# Patient Record
Sex: MALE | Race: WHITE | NOT HISPANIC OR LATINO | Employment: OTHER | URBAN - METROPOLITAN AREA
[De-identification: names, ages, dates, MRNs, and addresses within clinical notes are randomized per-mention and may not be internally consistent; named-entity substitution may affect disease eponyms.]

---

## 2017-01-05 ENCOUNTER — GENERIC CONVERSION - ENCOUNTER (OUTPATIENT)
Dept: OTHER | Facility: OTHER | Age: 77
End: 2017-01-05

## 2017-03-28 ENCOUNTER — ALLSCRIPTS OFFICE VISIT (OUTPATIENT)
Dept: OTHER | Facility: OTHER | Age: 77
End: 2017-03-28

## 2017-03-28 DIAGNOSIS — E11.9 TYPE 2 DIABETES MELLITUS WITHOUT COMPLICATIONS (HCC): ICD-10-CM

## 2017-05-30 ENCOUNTER — ALLSCRIPTS OFFICE VISIT (OUTPATIENT)
Dept: OTHER | Facility: OTHER | Age: 77
End: 2017-05-30

## 2017-05-30 DIAGNOSIS — M25.552 PAIN IN LEFT HIP: ICD-10-CM

## 2017-05-31 ENCOUNTER — HOSPITAL ENCOUNTER (OUTPATIENT)
Dept: RADIOLOGY | Facility: HOSPITAL | Age: 77
Discharge: HOME | End: 2017-05-31
Payer: MEDICARE

## 2017-05-31 ENCOUNTER — TRANSCRIBE ORDERS (OUTPATIENT)
Dept: RADIOLOGY | Facility: HOSPITAL | Age: 77
End: 2017-05-31

## 2017-05-31 DIAGNOSIS — M25.552 PAIN IN LEFT HIP: ICD-10-CM

## 2017-05-31 PROCEDURE — 73502 X-RAY EXAM HIP UNI 2-3 VIEWS: CPT

## 2017-06-08 ENCOUNTER — ALLSCRIPTS OFFICE VISIT (OUTPATIENT)
Dept: OTHER | Facility: OTHER | Age: 77
End: 2017-06-08

## 2017-07-21 ENCOUNTER — APPOINTMENT (OUTPATIENT)
Dept: LAB | Facility: CLINIC | Age: 77
End: 2017-07-21
Payer: MEDICARE

## 2017-07-21 DIAGNOSIS — E11.9 TYPE 2 DIABETES MELLITUS WITHOUT COMPLICATIONS (HCC): ICD-10-CM

## 2017-07-21 LAB
EST. AVERAGE GLUCOSE BLD GHB EST-MCNC: 131 MG/DL
GLUCOSE P FAST SERPL-MCNC: 138 MG/DL (ref 65–99)
HBA1C MFR BLD: 6.2 % (ref 4.2–6.3)

## 2017-07-21 PROCEDURE — 36415 COLL VENOUS BLD VENIPUNCTURE: CPT

## 2017-07-21 PROCEDURE — 83036 HEMOGLOBIN GLYCOSYLATED A1C: CPT

## 2017-07-21 PROCEDURE — 82947 ASSAY GLUCOSE BLOOD QUANT: CPT

## 2017-08-01 ENCOUNTER — ALLSCRIPTS OFFICE VISIT (OUTPATIENT)
Dept: OTHER | Facility: OTHER | Age: 77
End: 2017-08-01

## 2017-08-01 DIAGNOSIS — E11.9 TYPE 2 DIABETES MELLITUS WITHOUT COMPLICATIONS (HCC): ICD-10-CM

## 2017-09-29 DIAGNOSIS — N40.1 ENLARGED PROSTATE WITH LOWER URINARY TRACT SYMPTOMS (LUTS): ICD-10-CM

## 2017-10-04 ENCOUNTER — APPOINTMENT (OUTPATIENT)
Dept: LAB | Facility: CLINIC | Age: 77
End: 2017-10-04
Payer: MEDICARE

## 2017-10-04 DIAGNOSIS — N40.1 ENLARGED PROSTATE WITH LOWER URINARY TRACT SYMPTOMS (LUTS): ICD-10-CM

## 2017-10-04 LAB — PSA SERPL-MCNC: 0.4 NG/ML (ref 0–4)

## 2017-10-04 PROCEDURE — 84153 ASSAY OF PSA TOTAL: CPT

## 2017-10-04 PROCEDURE — 36415 COLL VENOUS BLD VENIPUNCTURE: CPT

## 2017-10-10 ENCOUNTER — ALLSCRIPTS OFFICE VISIT (OUTPATIENT)
Dept: OTHER | Facility: OTHER | Age: 77
End: 2017-10-10

## 2017-10-10 LAB
BILIRUB UR QL STRIP: NEGATIVE
CLARITY UR: NORMAL
COLOR UR: YELLOW
GLUCOSE (HISTORICAL): NEGATIVE
HGB UR QL STRIP.AUTO: NEGATIVE
KETONES UR STRIP-MCNC: NEGATIVE MG/DL
LEUKOCYTE ESTERASE UR QL STRIP: NEGATIVE
NITRITE UR QL STRIP: NEGATIVE
PH UR STRIP.AUTO: 5.5 [PH]
PROT UR STRIP-MCNC: NEGATIVE MG/DL
SP GR UR STRIP.AUTO: <=1.005
UROBILINOGEN UR QL STRIP.AUTO: 0.2

## 2017-10-13 ENCOUNTER — GENERIC CONVERSION - ENCOUNTER (OUTPATIENT)
Dept: OTHER | Facility: OTHER | Age: 77
End: 2017-10-13

## 2017-10-16 ENCOUNTER — GENERIC CONVERSION - ENCOUNTER (OUTPATIENT)
Dept: OTHER | Facility: OTHER | Age: 77
End: 2017-10-16

## 2017-10-19 ENCOUNTER — GENERIC CONVERSION - ENCOUNTER (OUTPATIENT)
Dept: OTHER | Facility: OTHER | Age: 77
End: 2017-10-19

## 2017-10-20 ENCOUNTER — GENERIC CONVERSION - ENCOUNTER (OUTPATIENT)
Dept: OTHER | Facility: OTHER | Age: 77
End: 2017-10-20

## 2017-11-09 ENCOUNTER — GENERIC CONVERSION - ENCOUNTER (OUTPATIENT)
Dept: OTHER | Facility: OTHER | Age: 77
End: 2017-11-09

## 2017-11-27 ENCOUNTER — APPOINTMENT (OUTPATIENT)
Dept: LAB | Facility: CLINIC | Age: 77
End: 2017-11-27
Payer: MEDICARE

## 2017-11-27 ENCOUNTER — TRANSCRIBE ORDERS (OUTPATIENT)
Dept: LAB | Facility: CLINIC | Age: 77
End: 2017-11-27

## 2017-11-27 DIAGNOSIS — E11.9 TYPE 2 DIABETES MELLITUS WITHOUT COMPLICATIONS (HCC): ICD-10-CM

## 2017-11-27 DIAGNOSIS — I25.10 ATHEROSCLEROSIS OF NATIVE CORONARY ARTERY WITHOUT ANGINA PECTORIS, UNSPECIFIED WHETHER NATIVE OR TRANSPLANTED HEART: Primary | ICD-10-CM

## 2017-11-27 DIAGNOSIS — I25.10 ATHEROSCLEROSIS OF NATIVE CORONARY ARTERY WITHOUT ANGINA PECTORIS, UNSPECIFIED WHETHER NATIVE OR TRANSPLANTED HEART: ICD-10-CM

## 2017-11-27 LAB
ALBUMIN SERPL BCP-MCNC: 3.6 G/DL (ref 3.5–5)
ALP SERPL-CCNC: 50 U/L (ref 46–116)
ALT SERPL W P-5'-P-CCNC: 23 U/L (ref 12–78)
ANION GAP SERPL CALCULATED.3IONS-SCNC: 8 MMOL/L (ref 4–13)
AST SERPL W P-5'-P-CCNC: 17 U/L (ref 5–45)
BILIRUB SERPL-MCNC: 0.71 MG/DL (ref 0.2–1)
BUN SERPL-MCNC: 18 MG/DL (ref 5–25)
CALCIUM SERPL-MCNC: 8.8 MG/DL (ref 8.3–10.1)
CHLORIDE SERPL-SCNC: 107 MMOL/L (ref 100–108)
CHOLEST SERPL-MCNC: 136 MG/DL (ref 50–200)
CO2 SERPL-SCNC: 24 MMOL/L (ref 21–32)
CREAT SERPL-MCNC: 1.4 MG/DL (ref 0.6–1.3)
ERYTHROCYTE [DISTWIDTH] IN BLOOD BY AUTOMATED COUNT: 14.3 % (ref 11.6–15.1)
EST. AVERAGE GLUCOSE BLD GHB EST-MCNC: 123 MG/DL
GFR SERPL CREATININE-BSD FRML MDRD: 48 ML/MIN/1.73SQ M
GLUCOSE P FAST SERPL-MCNC: 132 MG/DL (ref 65–99)
HBA1C MFR BLD: 5.9 % (ref 4.2–6.3)
HCT VFR BLD AUTO: 42.9 % (ref 36.5–49.3)
HDLC SERPL-MCNC: 53 MG/DL (ref 40–60)
HGB BLD-MCNC: 14.4 G/DL (ref 12–17)
LDLC SERPL CALC-MCNC: 65 MG/DL (ref 0–100)
LDLC SERPL DIRECT ASSAY-MCNC: 77 MG/DL (ref 0–100)
MCH RBC QN AUTO: 31.2 PG (ref 26.8–34.3)
MCHC RBC AUTO-ENTMCNC: 33.6 G/DL (ref 31.4–37.4)
MCV RBC AUTO: 93 FL (ref 82–98)
PLATELET # BLD AUTO: 159 THOUSANDS/UL (ref 149–390)
PMV BLD AUTO: 12.9 FL (ref 8.9–12.7)
POTASSIUM SERPL-SCNC: 3.6 MMOL/L (ref 3.5–5.3)
PROT SERPL-MCNC: 7 G/DL (ref 6.4–8.2)
RBC # BLD AUTO: 4.62 MILLION/UL (ref 3.88–5.62)
SODIUM SERPL-SCNC: 139 MMOL/L (ref 136–145)
TRIGL SERPL-MCNC: 90 MG/DL
TSH SERPL DL<=0.05 MIU/L-ACNC: 1.98 UIU/ML (ref 0.36–3.74)
WBC # BLD AUTO: 8.66 THOUSAND/UL (ref 4.31–10.16)

## 2017-11-27 PROCEDURE — 83036 HEMOGLOBIN GLYCOSYLATED A1C: CPT

## 2017-11-27 PROCEDURE — 83721 ASSAY OF BLOOD LIPOPROTEIN: CPT

## 2017-11-27 PROCEDURE — 84443 ASSAY THYROID STIM HORMONE: CPT

## 2017-11-27 PROCEDURE — 36415 COLL VENOUS BLD VENIPUNCTURE: CPT

## 2017-11-27 PROCEDURE — 80053 COMPREHEN METABOLIC PANEL: CPT

## 2017-11-27 PROCEDURE — 80061 LIPID PANEL: CPT

## 2017-11-27 PROCEDURE — 85027 COMPLETE CBC AUTOMATED: CPT

## 2017-12-05 ENCOUNTER — ALLSCRIPTS OFFICE VISIT (OUTPATIENT)
Dept: OTHER | Facility: OTHER | Age: 77
End: 2017-12-05

## 2017-12-05 DIAGNOSIS — E78.5 HYPERLIPIDEMIA: ICD-10-CM

## 2017-12-05 DIAGNOSIS — E11.9 TYPE 2 DIABETES MELLITUS WITHOUT COMPLICATIONS (HCC): ICD-10-CM

## 2017-12-06 NOTE — PROGRESS NOTES
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 2,440 9) (I25 10)    Plan  Arteriosclerotic cardiovascular disease    · Follow-up visit in 4 Months Evaluation and Treatment  Follow-up  Status: Hold For - Scheduling Requested for: 68CPO9740  DMII (diabetes mellitus, type 2)    · (1) HEMOGLOBIN A1C; Status:Active; Requested for:07Qkn1192;    · (1) MICROALBUMIN CREATININE RATIO, RANDOM URINE; Status:Active; Requested for:96Yhv3997;    · (1) URINALYSIS w URINE C/S REFLEX (will reflex a microscopy if leukocytes, occult blood, ornitrites are not within normal limits); Status:Active; Requested for:74Pfl3326;   Hyperlipidemia    · (1) CBC/PLT/DIFF; Status:Active; Requested for:75Uek4014;    · (1) COMPREHENSIVE METABOLIC PANEL; Status:Active; Requested for:47Jal5846;    · (1) LIPID PANEL, FASTING; Status:Active; Requested for:56Hcf1649;     Discussion/Summary  Discussion Summary:   1  Hyperlipidemia  Patient continues to be followed very closely with his history of coronary artery disease and diabetes  At this point his cholesterol is under decent control and no change in medication is appropriate  Again we discussed with the patient the importance of reducing his consumption of fats and cholesterol  Diabetes mellitus type 2  Patient is commended on the fact that he has been able to get his sugar down and his change in diet  He is trying to eliminate gluten and simple carbohydrates as much as possible from his diet  We will check a fasting blood sugar, hemoglobin A1c with his next visit in 4 months  Hypertension  Patient's blood pressure is controlled and we will continue present surveillance  Patient is to continue with present medication  Atherosclerotic cardiovascular disease   Patient continues to be followed closely by his cardiologist every 4 months and he has had no recent testing as to the status of his disease  He is asymptomatic  Counseling Documentation With Imm: The patient was counseled regarding diagnostic results,-- instructions for management,-- risk factor reductions,-- prognosis,-- patient and family education,-- impressions,-- risks and benefits of treatment options,-- importance of compliance with treatment  Medication SE Review and Pt Understands Tx: Possible side effects of new medications were reviewed with the patient/guardian today  The treatment plan was reviewed with the patient/guardian  The patient/guardian understands and agrees with the treatment plan      Chief Complaint  Chief Complaint Free Text Note Form: Patient is here today for a 4 month follow up   [address] [place] [address] Ave: Patient is here today for follow up of chronic conditions described in HPI  History of Present Illness  HPI: Patient is a 59-year-old male with a history of hypertension, hyperlipidemia, coronary artery disease, diabetes mellitus type 2, gastroesophageal reflux disease with Wright's esophagitis, DJD, BPH  Patient is here today for routine follow-up after a 4 month period of time  Patient states in general he has been feeling well and he has no new complaints or concerns  He is denying chest pain or pressure and no shortness of breath  He continues to follow-up with his cardiologist on a regular basis  He did have recent tests performed a lab we did discuss the results  He did have a CBC, cholesterol profile, comprehensive metabolic profile performed  The only abnormality is a slight elevation in his fasting blood sugar to 132 but his hemoglobin A1c shows good control at 5 9  Patient states he is trying to eliminate gluten from his diet      Review of Systems  Complete-Male:  Constitutional: No fever or chills, feels well, no tiredness, no recent weight gain or weight loss    Eyes: eyesight problems-- and-- Some decreased visual acuity in his right eye secondary to senile cataract but he was told he is not ready for surgery as of yet, but-- no eye pain,-- no dryness of the eyes,-- eyes not red-- and-- no itching of the eyes  ENT: no complaints of earache, no hearing loss, no nosebleeds, no nasal discharge, no sore throat, no hoarseness  Cardiovascular: No complaints of slow heart rate, no fast heart rate, no chest pain, no palpitations, no leg claudication, no lower extremity,-- the heart rate was not slow,-- no chest pain,-- no intermittent leg claudication,-- the heart rate was not fast,-- no palpitations-- and-- no extremity edema  Respiratory: no shortness of breath,-- no cough,-- no orthopnea,-- no wheezing,-- no shortness of breath during exertion-- and-- no PND  Gastrointestinal: no abdominal pain,-- no nausea,-- no vomiting,-- no constipation,-- no diarrhea-- and-- no blood in stools  Genitourinary: urinary hesitancy,-- nocturia-- and-- Long-standing history of nocturia and hesitancy from his BPH symptoms, but-- no dysuria,-- no genital lesions,-- no incontinence-- and-- no testicular pain  Musculoskeletal: arthralgias,-- myalgias-- and-- Some generalized arthritic aches and pains, but-- no joint swelling,-- no limb pain,-- no joint stiffness-- and-- no limb swelling  Integumentary: No complaints of skin rash or skin lesions, no itching, no skin wound, no dry skin  Neurological: numbness,-- tingling-- and-- Patient does have evidence of some peripheral neuropathy which is long-standing and he states has not changed, but-- no headache,-- no confusion,-- no dizziness,-- no limb weakness,-- no convulsions,-- no fainting-- and-- no difficulty walking  Psychiatric: Is not suicidal, no sleep disturbances, no anxiety or depression, no change in personality, no emotional problems  Endocrine: No complaints of proptosis, no hot flashes, no muscle weakness, no erectile dysfunction, no deepening of the voice, no feelings of weakness  Hematologic/Lymphatic: No complaints of swollen glands, no swollen glands in the neck, does not bleed easily, no easy bruising  ROS Reviewed:   ROS reviewed  Active Problems  1  Abnormal glucose (790 29) (R73 09)   2  Adjustment disorder with mixed anxiety and depressed mood (309 28) (F43 23)   3  Arteriosclerotic cardiovascular disease (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 2,440 9) (I25 10)   4  Atypical chest pain (786 59) (R07 89)   5  Backache (724 5) (M54 9)   6  Benign essential hypertension (401 1) (I10)   7  Benign prostatic hyperplasia without lower urinary tract symptoms (600 00) (N40 0)   8  Cough, persistent (786 2) (R05)   9  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 (250 50,362 04) (E11 329)   10  DMII (diabetes mellitus, type 2) (250 00) (E11 9)   11  Esophageal reflux (530 81) (K21 9)   12  Heme positive stool (792 1) (R19 5)   13  Hip pain, left (719 45) (M25 552)   14  Hyperlipidemia (272 4) (E78 5)   15  Lumbago With Sciatica (724 3)   16  Need for immunization against influenza (V04 81) (Z23)   17  Peripheral neuropathy (356 9) (G62 9)   18  Postoperative examination (V67 00) (Z09)   19  Screening for diabetic retinopathy (V80 2) (Z13 5)   20  Syncope and collapse (780 2) (R55)   21  Visit for screening (V82 9) (Z13 9)    Past Medical History  1  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)   2  History of Cataract, acquired (366 9) (H26 9)   3  History of Coronary artery disease (414 00) (I25 10)   4  History of Diabetes mellitus type II, controlled (250 00) (E11 9)   5  History of Enlarged prostate with lower urinary tract symptoms (LUTS) (600 01) (N40 1)   6  History of Hemoptysis (786 30) (R04 2)   7  History of diabetic neuropathy (V12 29) (Z86 39)   8  History of gastroesophageal reflux (GERD) (V12 79) (Z87 19)   9  History of hypercholesterolemia (V12 29) (Z86 39)   10  History of hypertension (V12 59) (Z86 79)   11  History of urinary retention (V13 09) (Z87 188)   12   History of Overactive bladder (596 51) (N32 81)   13  History of Swollen ankles (729 81) (M25 471,M25 472)   14  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 1)   15  History of Umbilical hernia (715 6) (K42 9)   16  History of Urge incontinence of urine (788 31) (N39 41)  Active Problems And Past Medical History Reviewed: The active problems and past medical history were reviewed and updated today  Surgical History  1  History of CABG   2  History of Cystoscopy (Diagnostic) Bladder   3  History of Inguinal Hernia Repair   4  History of Umbilical Hernia Repair  Surgical History Reviewed: The surgical history was reviewed and updated today  Family History  Mother    1  Family history of Cancer   2  Family history of Digestive System Complications (R95 12)  Father    3  Family history of Diabetes Mellitus (V18 0)   4  Family history of Heart Disease (V17 49)  Family History Reviewed: The family history was reviewed and updated today  Social History     · Always uses seat belt   · Being A Social Drinker   · Caffeine use (V49 89) (F15 90)   · Denied: History of Drug Use   · Employed in sales position   · Former smoker (Q57 34) (L09 507)   ·   Social History Reviewed: The social history was reviewed and updated today  The social history was reviewed and is unchanged  Current Meds   1  Aspirin 81 MG TABS; TAKE 1 TABLET DAILY; Therapy: (Recorded:31Ren2516) to Recorded   2  Centrum Oral Tablet Recorded   3  Eye Vitamins CAPS; once daily; Therapy: (Starling Hummer) to Recorded   4  Finasteride 5 MG Oral Tablet; Take 1 tablet daily; Therapy: 93VJB4855 to (Evaluate:11Oct2018)  Requested for: 29QXP1768; Last Rx:16Oct2017 Ordered   5  Fish Oil 1200 MG Oral Capsule Recorded   6  Irbesartan 300 MG Oral Tablet; Therapy: (Recorded:10Oct2017) to Recorded   7  Lisinopril 20 MG Oral Tablet; TAKE 1 TABLET TWICE DAILY; Therapy: (Starling Hummer) to Recorded   8   MetFORMIN HCl  MG Oral Tablet Extended Release 24 Hour; TAKE TWO TABLETS BY MOUTH TWICE A DAY WITH FOOD; Therapy: 04JIV8789 to (Evaluate:16Jun2018)  Requested for: 21Jun2017; Last VQ:76SMN2946 Ordered   9  Omeprazole 40 MG Oral Capsule Delayed Release Recorded   10  OneTouch Ultra Blue In Vitro Strip; USE TO TEST 3 TIMES A DAY; Therapy: 57ORP6520 to (Karen Bleacher)  Requested for: 34Ggj1135; Last Rx:59Yrx8033  Ordered   11  Simvastatin 20 MG Oral Tablet; Therapy: 92AKM5028 to Recorded   12  Terazosin HCl - 10 MG Oral Capsule; take 1 capsule daily; Therapy: 04QJD9822 to (Evaluate:11Oct2018)  Requested for: 16NKC2027; Last Rx:16Oct2017  Ordered   13  Terazosin HCl - 10 MG Oral Capsule; Therapy: 74WNN1540 to (Last Rx:98Krc5727)  Requested for: 84Spa1877 Ordered   14  TraMADol HCl - 50 MG Oral Tablet; Take 1 tablet every 6 hours as needed for pain; Therapy: 87EGE1541 to (Last Rx:99Xck1060) Ordered   15  VESIcare 10 MG Oral Tablet; Take 1 tablet daily; Therapy: 13UGE2406 to (95 974711)  Requested for: 11Aug2017; Last Rx:23Ahu4393  Ordered   16  Vitamin D3 2000 UNIT Oral Tablet; once daily; Therapy: (Recorded:78Pes2937) to Recorded  Medication List Reviewed: The medication list was reviewed and updated today  Allergies  1  Morphine Derivatives  2  No Known Environmental Allergies   3  No Known Food Allergies    Vitals  Vital Signs    Recorded: 62ZAE4029 02:29PM   Temperature 97 8 F   Heart Rate 69   Systolic 507   Diastolic 70   Height 5 ft 8 in   Weight 200 lb    BMI Calculated 30 41   BSA Calculated 2 04   O2 Saturation 98       Physical Exam   Constitutional Very pleasant overweight 59-year-old male who is awake alert in no acute distress at this time  Eyes  Conjunctiva and lids: No swelling, erythema, or discharge  Pupils and irises: Equal, round and reactive to light     Ears, Nose, Mouth, and Throat  External inspection of ears and nose: Normal    Pulmonary  Respiratory effort: No increased work of breathing or signs of respiratory distress  Auscultation of lungs: Clear to auscultation, equal breath sounds bilaterally, no wheezes, no rales, no rhonci  Cardiovascular  Palpation of heart: Normal PMI, no thrills  Auscultation of heart: Normal rate and rhythm, normal S1 and S2, without murmurs  Examination of extremities for edema and/or varicosities: Normal    Abdomen  Abdomen: Abnormal  -- Obese soft nontender with positive bowel sounds Ã4 quadrants  Liver and spleen: No hepatomegaly or splenomegaly  Lymphatic  Palpation of lymph nodes in neck: No lymphadenopathy  Musculoskeletal  Gait and station: Normal    Digits and nails: Normal without clubbing or cyanosis  Inspection/palpation of joints, bones, and muscles: Normal    Skin  Skin and subcutaneous tissue: Normal without rashes or lesions  Neurologic  Cranial nerves: Cranial nerves 2-12 intact  Reflexes: 2+ and symmetric  Psychiatric  Orientation to person, place and time: Normal    Mood and affect: Normal    Diabetic Foot Screen: Abnormal    Socks and shoes removed, the Right Foot: the foot was normal, no swelling, no erythema  The toes on the right were normal   The sensory exam showed diminished vibratory sensation at the level of the toes-- and-- diminished position sense at the level of the toes  Diminished tactile sensation with monofilament testing throughout the right foot  Socks and shoes removed, Left Foot: the foot was normal, no swelling, no erythema  The toes on the left were normal   The sensory exam showed diminished vibratory sensation at the level of the toes-- and-- diminished position sense at the level of the toes  Diminished tactile sensation with monofilament testing throughout the left foot  Capillary refills findings on the right were normal in the toes  Pulses:  1+ in the posterior tibialis on the right  Capillary refills findings on the left were normal in the toes    Pulses:  1+ in the posterior tibialis on the left  1+ in the dorsalis pedis on the left  Assign Risk Category: 2: Loss of protective sensation with or without weakness, deformity, callus, pre-ulcer, or history of ulceration  High risk  Health Management  Benign essential hypertension   COLONOSCOPY; every 5 years; Last 80PAA8100; Next Due: 75EZU3336;  Active    Future Appointments    Date/Time Provider Specialty Site   04/05/2018 02:30 PM Debra Mcclure DO Internal Medicine [facility] INTERNAL MED   10/10/2018 11:30 AM Mukesh Ferrell MD Urology [address]       Signatures   Electronically signed by : Echo Silveira DO; Dec  5 2017  4:00PM EST                       (Author)

## 2017-12-14 ENCOUNTER — GENERIC CONVERSION - ENCOUNTER (OUTPATIENT)
Dept: INTERNAL MEDICINE CLINIC | Facility: CLINIC | Age: 77
End: 2017-12-14

## 2018-01-13 VITALS
BODY MASS INDEX: 30.84 KG/M2 | HEIGHT: 68 IN | SYSTOLIC BLOOD PRESSURE: 136 MMHG | DIASTOLIC BLOOD PRESSURE: 80 MMHG | WEIGHT: 203.5 LBS

## 2018-01-13 VITALS
HEART RATE: 96 BPM | OXYGEN SATURATION: 98 % | HEIGHT: 68 IN | BODY MASS INDEX: 30.52 KG/M2 | WEIGHT: 201.38 LBS | SYSTOLIC BLOOD PRESSURE: 140 MMHG | DIASTOLIC BLOOD PRESSURE: 70 MMHG | TEMPERATURE: 98.7 F

## 2018-01-13 VITALS
OXYGEN SATURATION: 98 % | WEIGHT: 204.5 LBS | DIASTOLIC BLOOD PRESSURE: 80 MMHG | HEART RATE: 73 BPM | SYSTOLIC BLOOD PRESSURE: 126 MMHG | TEMPERATURE: 97.7 F | BODY MASS INDEX: 30.99 KG/M2 | HEIGHT: 68 IN

## 2018-01-14 VITALS
TEMPERATURE: 97.8 F | OXYGEN SATURATION: 98 % | HEART RATE: 68 BPM | SYSTOLIC BLOOD PRESSURE: 142 MMHG | BODY MASS INDEX: 31.22 KG/M2 | WEIGHT: 206 LBS | HEIGHT: 68 IN | DIASTOLIC BLOOD PRESSURE: 82 MMHG

## 2018-01-14 NOTE — RESULT NOTES
Message   benign polyp  Colonoscopy in 5 years  Verified Results  (1) TISSUE EXAM 25Apr2016 11:05AM Miracle Jean Baptiste   polyp     Test Name Result Flag Reference   LAB AP CASE REPORT (Report)     Surgical Pathology Report             Case: [id number]                   Authorizing Provider: Jacob Platt MD      Collected:      04/25/2016 1105        Ordering Location:   [address] [place] [address]   Received:      04/25/2016 9646 [facility] Endoscopy                               Pathologist:      Sary Ramírez MD                              Specimens:  A) - Polyp, Colorectal, Transverse colon polyp, cold bx                        B) - Polyp, Colorectal, Sigmoid colon polyp, cold bx   LAB AP FINAL DIAGNOSIS (Report)     A  Transverse colon polyp:  - Benign polypoid colonic mucosa without histopathologic abnormality   - No epithelial dysplasia and no evidence of malignancy  B  Sigmoid colon polyp:  - Tubular adenoma (adenomatous polyp)  - No high grade dysplasia and no evidence of malignancy  Interpretation performed at [facility]   LAB AP SURGICAL ADDITIONAL INFORMATION (Report)     These tests were developed and their performance characteristics   determined by [address] [place] [address] or [facility] [employer]  They may not be cleared or approved by the [place]  Food and   Drug Administration  The FDA has determined that such clearance or   approval is not necessary  These tests are used for clinical purposes  They should not be regarded as investigational or for research  This   laboratory has been approved by [place] 88, designated as a high-complexity   laboratory and is qualified to perform these tests  LAB AP GROSS DESCRIPTION (Report)     A  The specimen is received in formalin, labeled with the patient's name   and hospital number, and is designated transverse colon polyp biopsy      The specimen consists of a single, rubbery, tan-brown tissue fragment   measuring up to 0 3 cm in greatest dimension  Entirely submitted  One   cassette  B  The specimen is received in formalin, labeled with the patient's name   and hospital number, and is designated sigmoid colon polyp biopsy  The   specimen consists of 2 rubbery, tan-brown tissue fragments measuring from   0 2 up to 0 3 cm in greatest dimension  Entirely submitted  One cassette  Note: The estimated total formalin fixation time based upon information   provided by the submitting clinician and the standard processing schedule   is 17 5 hours  SRS     LAB AP CLINICAL INFORMATION polyp

## 2018-01-14 NOTE — RESULT NOTES
Message   Kidney test slightly elevated  Drink more water  Verified Results  (1) COMPREHENSIVE METABOLIC PANEL 24HTX7505 28:24SD Ilya Hidden Order Number: [id number]     Test Name Result Flag Reference   GLUCOSE,RANDM 195 mg/dL H    If the patient is fasting, the ADA then defines impaired fasting glucose as > 100 mg/dL and diabetes as > or equal to 123 mg/dL  SODIUM 140 mmol/L  136-145   POTASSIUM 4 2 mmol/L  3 5-5 3   CHLORIDE 105 mmol/L  100-108   CARBON DIOXIDE 24 mmol/L  21-32   ANION GAP (CALC) 11 mmol/L  4-13   BLOOD UREA NITROGEN 20 mg/dL  5-25   CREATININE 1 39 mg/dL H 0 60-1 30   Standardized to IDMS reference method   CALCIUM 9 0 mg/dL  8 3-10 1   BILI, TOTAL 0 48 mg/dL  0 20-1 00   ALK PHOSPHATAS 60 U/L     ALT (SGPT) 19 U/L  12-78   AST(SGOT) 12 U/L  5-45   ALBUMIN 3 6 g/dL  3 5-5 0   TOTAL PROTEIN 6 8 g/dL  6 4-8 2   eGFR Non-African American 49 7 ml/min/1 73sq [facility] [employer] Disease Education Program recommendations are as follows:  GFR calculation is accurate only with a steady state creatinine  Chronic Kidney disease less than 60 ml/min/1 73 sq  meters  Kidney failure less than 15 ml/min/1 73 sq  meters       (1) MICROALBUMIN CREATININE RATIO, RANDOM URINE 91Zbv1916 03:38PM Ilya Hidden Order Number: [id number]     Test Name Result Flag Reference   MICROALBUMIN/ CREAT R 18 mg/g creatinine  0-30   MICROALBUMIN,URINE 21 3 mg/L H 0 0-20 0   CREATININE URINE 120 0 mg/dL

## 2018-01-15 VITALS
BODY MASS INDEX: 31.22 KG/M2 | HEART RATE: 87 BPM | HEIGHT: 68 IN | TEMPERATURE: 98 F | DIASTOLIC BLOOD PRESSURE: 72 MMHG | OXYGEN SATURATION: 98 % | WEIGHT: 206 LBS | SYSTOLIC BLOOD PRESSURE: 140 MMHG

## 2018-01-15 NOTE — RESULT NOTES
Verified Results  Hemoglobin A1c- POC 13ERM0140 11:20AM Charley Diaz     Test Name Result Flag Reference   HEMOGLOBIN A1C 6 3 A    EST  AVG  GLUCOSE 134 A    HEMOGLOBIN A1C 6 3 A    EST  AVG   GLUCOSE 134 A

## 2018-01-15 NOTE — RESULT NOTES
Verified Results  Hemoglobin A1c- POC 71JTI1184 12:10PM Matt Penaroy     Test Name Result Flag Reference   HEMOGLOBIN A1C 6 2 A    EST  AVG  GLUCOSE 131 A    HEMOGLOBIN A1C 6 2 A    EST  AVG   GLUCOSE 131 A

## 2018-01-22 VITALS
OXYGEN SATURATION: 98 % | HEIGHT: 68 IN | DIASTOLIC BLOOD PRESSURE: 70 MMHG | BODY MASS INDEX: 30.31 KG/M2 | TEMPERATURE: 97.8 F | SYSTOLIC BLOOD PRESSURE: 142 MMHG | HEART RATE: 69 BPM | WEIGHT: 200 LBS

## 2018-01-23 NOTE — PROGRESS NOTES
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, DMII (diabetes mellitus,  type 2)    · *VB-Foot Exam; Status:Active - Retrospective By Protocol Authorization; Requested  HFP:[id number];     Discussion/Summary    #1  Hyperlipidemia  As noted patient did have a repeat lipid profile and he is showing relatively good control but patient could do better with his diet  We did discuss with the patient working on diet, production of his saturated fats and carbohydrates in his diet and getting more aerobic exercise  #2  Diabetes mellitus type 2  Patient' S  checking his fasting blood sugars are daily basis and has noted an increase since he has been on a reduced dose of metformin  Patient will discuss this with his endocrinologist and he will be due for another hemoglobin A1c in a few months  #3  Atherosclerotic cardiovascular disease  Patient continues to followup with his cardiologist and has no evidence of new disease  #4  Peripheral neuropathy  This is most likely secondary to his diabetes  Patient states that this is manageable at this point in time it does not keep him from sleeping  He was told if increasing pain or problems with his neuropathy to please call  #5  Heme-positive stools  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 for this   I gave him the option of having a colonoscopy either here or with his physician in [place]  Patient states that he would prefer to have a colonoscopy performed in this area and this will be arranged with the GI physician he knows of in this area  Patient was told this must be done in the near future  #6  Hypertension  Controlled with present treatment  Impression: Initial Annual Wellness Visit, with preventive exam as well as age and risk appropriate counseling completed  Cardiovascular screening and counseling: the risks and benefits of screening were discussed and screening is current  Diabetes screening and counseling: the risks and benefits of screening were discussed and screening is current  Colorectal cancer screening and counseling: the risks and benefits of screening were discussed and screening is current  Prostate cancer screening and counseling: the risks and benefits of screening were discussed and screening is current  Osteoporosis screening and counseling: the risks and benefits of screening were discussed and the patient declines screening  Abdominal aortic aneurysm screening and counseling: the risks and benefits of screening were discussed and screening is current  Glaucoma screening and counseling: the risks and benefits of screening were discussed and screening is current  HIV screening and counseling: screening not indicated   Immunizations: the risks and benefits of influenza vaccination were discussed with the patient, influenza vaccine is up to date this year, the risks and benefits of pneumococcal vaccination were discussed with the patient, the lifetime pneumococcal vaccine has been completed, hepatitis B vaccination series is not indicated at this time due to the patient's low risk of lito the disease, the risks and benefits of the Zostavax vaccine were discussed with the patient, the patient declines the Zostavax vaccine, the risks and benefits of the Td vaccine were discussed with the patient and Td vaccination status is unknown  Advance Directive Planning: complete and up to date  Patient Discussion: plan discussed with the patient, follow-up visit needed in 4 months  Chief Complaint  Patient is here today for his Medicare Wellness exam       History of Present Illness  HPI: Patient is 28-year-old male with a history of multiple medical problems including hypertension, hyperlipidemia, history of coronary artery disease, diabetes mellitus type 2, diabetic neuropathy, DJD, exogenous obesity  Patient is here today for a Medicare wellness exam  Because patient has just stopped working this will be his first exam  In general patient states she's doing well but has noticed that sugars have increased since she was taken off partially of his metformin  Patient states prior to stopping his metformin he had exceedingly good control of his blood sugars but now has regressed  Because he was not do we don't have a recent hemoglobin A1c  His routine tests show his stool to be positive x2/3 for blood and I suggested that he contact his gastroenterologist here in the [place]  His cholesterol was 167 his HDL is 52 his LDL is 97 triglycerides are 89 showing good control vitamin D is slowly low at 28 6 and I told the patient that he needs to double his supplements  Thyroid function is normal his electrolytes and liver tests are normal and thyroid function is again normal  Patient has no new medical complaints or problems otherwise  Welcome to [employer] and Wellness Visits: The patient is being seen for the initial annual wellness visit  Medicare Screening and Risk Factors   Hospitalizations: he has been previously hospitalizied  Once per lifetime medicare screening tests: ECG  Medicare Screening Tests Risk Questions   Abdominal aortic aneurysm risk assessment: over 72years of age  Osteoporosis risk assessment:  and over 48years of age  HIV risk assessment: none indicated     Drug and Alcohol Use: The patient is a former cigarette smoker  He has smoked for 35 pack years year(s)  The patient reports rare alcohol use and Patient states he is a social drinker  He has never used illicit drugs  Diet and Physical Activity: Current diet includes well balanced meals  He exercises infrequently  Exercise: walking  Mood Disorder and Cognitive Impairment Screening: Geriatric Depression Scale   Depression screening score was Total score of 6 with the geriatric depression scale and all his answers are appropriate given his medical problems and recent MCC   mild to moderate symptoms  He denies feeling down, depressed, or hopeless over the past two weeks  Cognitive impairment screening: denies difficulty learning/retaining new information, denies difficulty handling complex tasks, denies difficulty with reasoning, denies difficulty with spatial ability and orientation, denies difficulty with language, denies difficulty with behavior and Total score of 30 on the Mini-Mental Status exam    Functional Ability/Level of Safety: Hearing is slightly decreased  He denies hearing difficulties  The patient is currently able to do activities of daily living without limitations, able to do instrumental activities of daily living without limitations, able to participate in social activities without limitations and able to drive without limitations  Activities of daily living details: does not need help using the phone, no transportation help needed, does not need help shopping, no meal preparation help needed, does not need help doing housework, does not need help doing laundry, does not need help managing medications and does not need help managing money   Fall risk factors:  polypharmacy, antihypertensive use and Diabetic no hypoglycemia, but The patient fell 0 times in the past 12 months , no alcohol use, no mobility impairment, no antidepressant use, no deconditioning, no postural hypotension, no sedative use, no visual impairment, no urinary incontinence, no cognitive impairment, up and go test was normal and no previous fall  Home safety risk factors:  no unfamiliar surroundings, no loose rugs, no poor household lighting, no uneven floors, no household clutter, grab bars in the bathroom and handrails on the stairs  Advance Directives: Advance directives: living will, durable power of  for health care directives and advance directives  end of life decisions were reviewed with the patient and I agree with the patient's decisions  Co-Managers and Medical Equipment/Suppliers: See Patient Care Team   Hospital Based Practices Required Assessment:   Pain Assessment   the patient states they do not have pain  Abuse And Domestic Violence Screen    Yes, the patient is safe at home  The patient states no one is hurting them  Depression And Suicide Screen  No, the patient has not had thoughts of hurting themself  No, the patient has not felt depressed in the past 7 days  Primary Language is  English  Readiness To Learn: Receptive  Barriers To Learning: none  Preferred Learning: verbal   Education Completed: disease/condition, medications and further treatment/follow-up   Teaching Method: verbal   Person Taught: patient   Evaluation Of Learning: verbalized/demonstrated understanding   Preventive Quality Program 65 and Older: Falls Risk: The patient fell times in the past 12 months  The patient is currently asymptomatic Symptoms Include: no confusion, no lightheadedness, no vertigo, no dizziness, no syncope, no impaired balance, no visual problems, no leg weakness, no recurring falls and no recent fall  Associated symptoms:  No associated symptoms are reported  The patient currently has no urinary incontinence symptoms   Urinary Incontinence Symptoms includes: no urinary incontinence, no incomplete bladder emptying, no urinary frequency, no urinary urgency, no urinary hesitancy, no dysuria, no nocturia, no straining, no weak stream, no intermittent stream, no post-void dribbling, no vaginal pressure and no vaginal dryness    Date of last glaucoma screen was Patient has yearly dilated exams for his diabetes and has glaucoma testing at that time      Patient Care Team    Care Team Member Role Specialty Office Number   Noé Ceballosluz BOLANOS  Specialist Cardiac Surgery [phone]   [facility] Specialist Endocrinology [phone]   Madonna BOLANOS  Specialist Endocrinology [phone]     Review of Systems    Constitutional: negative  Head and Face: negative  Eyes: negative  ENT: negative  Cardiovascular: lower extremity edema and Patient did report he had some swelling of his feet ankles during his recent vacation but now this has resolved, but no chest pain, no palpitations, the heart is not racing and no lightheadedness  Respiratory: negative  Gastrointestinal: negative  Genitourinary: negative  Musculoskeletal: negative  Integumentary and Breasts: negative  Neurological: paresthesias and Very mild numbness and tingling of the feet and toes, but no headache, no confusion, no dizziness, no fainting, no saddle paresthesia, no leg numbness, no leg weakness, no tingling and no difficulty walking  Psychiatric: anxiety, but as noted in HPI, no insomnia, no irritability and not suicidal    Endocrine: negative  Hematologic and Lymphatic: negative  Active Problems    1  Abnormal glucose (790 29) (R73 09)   2  Adjustment disorder with mixed anxiety and depressed mood (309 28) (F43 23)   3  Atherosclerotic heart disease of native coronary artery without angina pectoris (414 01)   (I25 10)   4  Atypical chest pain (786 59) (R07 89)   5  Backache (724 5) (M54 9)   6  Benign essential hypertension (401 1) (I10)   7  Benign Prostatic Hypertrophy Without Urinary Obstruction With Other Lower Urinary Tract   Symptoms (600 00)   8   Diabetes mellitus type II, controlled (250 00) (E11 9)   9  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 (250 50,362 04)   (E11 329)   10  DMII (diabetes mellitus, type 2) (250 00) (E11 9)   11  Esophageal reflux (530 81) (K21 9)   12  Hyperlipidemia (272 4) (E78 5)   13  Lumbago With Sciatica (724 3)   14  Need for immunization against influenza (V04 81) (Z23)   15  Peripheral neuropathy (356 9) (G62 9)   16  Postoperative examination (V67 00) (Z09)   17  Visit for screening (V82 9) (Z13 9)    Past Medical History    · 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)   · History of Coronary artery disease (414 00) (I25 10)   · History of Umbilical hernia (277 9) (K42 9)    Surgical History    · History of CABG   · History of Inguinal Hernia Repair    Family History    · Family history of Cancer   · Family history of Digestive System Complications (Y58 12)    · Family history of Diabetes Mellitus (V18 0)   · Family history of Heart Disease (V17 49)    Social History    · Always uses seat belt   · Being A Social Drinker   · Denied: History of Drug Use   · Employed in sales position   · Former smoker (V15 82) (E53 922)   · Pt stated over 42 yrs ago   ·     Current Meds   1  Aspirin 81 MG Oral Tablet Recorded   2  Centrum Oral Tablet Recorded   3  Finasteride 5 MG Oral Tablet; Therapy: 93IUB0088 to Recorded   4  Lisinopril 20 MG Oral Tablet; take 1 tablet by mouth twice a day as directed; Therapy: 26EFK0151 to (Evaluate:06Jun2016)  Requested for: 12Jun2015; Last   Rx:12Jun2015 Ordered   5  MetFORMIN HCl  MG Oral Tablet Extended Release 24 Hour; 2 tablets twice per   day with food; Therapy: 71SPJ3252 to (Evaluate:82Ref9875)  Requested for: 50Aou9171; Last   Rx:43Nea2526 Ordered   6  Ocuvite Oral Tablet; Take 1 tablet daily Recorded   7  Omeprazole 40 MG Oral Capsule Delayed Release Recorded   8  OneTouch Ultra Blue In Vitro Strip; USE TO TEST 3 TIMES A DAY;    Therapy: 35KRT8998 to (Evaluate:09Mar2015)  Requested for: 92GAF2508; Last   Rx:34Usx5673 Ordered   9  Simvastatin 20 MG Oral Tablet; Therapy: 17LSH4442 to (Last Rx:08Jan2011)  Requested for: 23TMZ1819 Ordered   10  Terazosin HCl - 10 MG Oral Capsule; Therapy: 15SWK8880 to (Last Rx:82Byc7224)  Requested for: 98Eck8848 Ordered   11  VESIcare 10 MG Oral Tablet; Therapy: 06CWG3146 to (Evaluate:11Jun2015) Recorded    Allergies    1  Morphine Derivatives    Immunizations   1 2    Influenza  [id number] 54Fka8954    Pneumococcal  46RVA8440      Vitals  Signs [Data Includes: Current Encounter]    Temperature: 97 8 F  Heart Rate: 62  Respiration: 17  Systolic: 528  Diastolic: 88  Height: 5 ft 8 5 in  Weight: 205 lb 8 0 oz  BMI Calculated: 30 79  BSA Calculated: 2 07  O2 Saturation: 95    Physical Exam    Constitutional Pleasant obese 59-year-old male who is awake alert stress in  Head and Face   Head and face: Normal     Palpation of the face and sinuses: No sinus tenderness  Eyes   Conjunctiva and lids: No erythema, swelling or discharge  Pupils and irises: Equal, round, reactive to light  Ophthalmoscopic examination: Abnormal   Pupils are small and unable to see fundi clearly  Patient does have yearly routine dilated exam for his diabetes  Ears, Nose, Mouth, and Throat   External inspection of ears and nose: Normal     Otoscopic examination: Tympanic membranes translucent with normal light reflex  Canals patent without erythema  Hearing: Normal     Nasal mucosa, septum, and turbinates: Normal without edema or erythema  Lips, teeth, and gums: Normal, good dentition  Oropharynx: Normal with no erythema, edema, exudate or lesions  Neck   Neck: Supple, symmetric, trachea midline, no masses  Thyroid: Normal, no thyromegaly  Pulmonary   Respiratory effort: No increased work of breathing or signs of respiratory distress  Percussion of chest: Normal     Palpation of chest: Normal     Auscultation of lungs: Clear to auscultation      Cardiovascular Palpation of heart: Normal PMI, no thrills  Auscultation of heart: Normal rate and rhythm, normal S1 and S2, no murmurs  Carotid pulses: 2+ bilaterally  Abdominal aorta: Normal     Femoral pulses: 2+ bilaterally  Pedal pulses: Abnormal   Dorsalis pedis and anterior tibial pulses are intact +1  Examination of extremities for edema and/or varicosities: Normal     Chest   Breasts: Abnormal   Mild gynecomastia  Palpation of breasts and axillae: Normal, no masses palpated  Chest: Normal     Abdomen   Abdomen: Abnormal   Obese soft nontender with positive bowel sounds x4 quadrants  Liver and spleen: No hepatomegaly or splenomegaly  Examination for hernias: Abnormal   Large ventral hernia and a smaller umbilical hernia  Genitourinary Patient has yearly urologic examinations and prostate check PSA  Lymphatic   Palpation of lymph nodes in neck: No lymphadenopathy  Palpation of lymph nodes in axillae: No lymphadenopathy  Palpation of lymph nodes in groin: No lymphadenopathy  Palpation of lymph nodes in other areas: No lymphadenopathy  Musculoskeletal   Gait and station: Normal     Inspection/palpation of digits and nails: Abnormal   Mild degenerative changes in hands and digits  Inspection/palpation of joints, bones, and muscles: Normal     Range of motion: Normal     Stability: Normal     Muscle strength/tone: Normal     Skin   Skin and subcutaneous tissue: Normal without rashes or lesions  Palpation of skin and subcutaneous tissue: Normal turgor  Neurologic   Cranial nerves: Cranial nerves 2-12 intact  Cortical function: Normal mental status  Reflexes: Abnormal   Patella and Achilles tendon reflexes are mildly reduced at +1 bilaterally  Sensation: No sensory loss  Coordination: Normal finger to nose and heel to shin  Diabetic Foot Exam: Right Foot Findings: normal foot   The toes were normal  The sensory exam showed diminished vibratory sensation at the level of the toes and diminished position sense at the level of the toes  The toes were normal  The sensory exam showed diminished vibratory sensation at the level of the toes and diminished position sense at the level of the toes  Monofilament Testing: diminished tactile sensation with monofilament testing throughout both feet  Vascular: Capillary refills findings on the right were normal in the toes  Pulses: 1+ in the posterior tibialis and 1+ in the dorsalis pedis  Capillary refills findings on the left were normal in the toes  Pulses: 1+ in the posterior tibialis and 1+ in the dorsalis pedis  Assign Risk Category: 0: No loss of protective sensation, no deformity  No present risk  Psychiatric   Judgment and insight: Normal     Orientation to person, place and time: Normal     Recent and remote memory: Intact  Mood and affect: Abnormal   As noted above patient does have problems occasionally with anxiety especially in social situations and he states when flying  Future Appointments    Date/Time Provider Specialty Site   05/26/2016 03:00 PM Ese Kim DO Internal Medicine [facility] INTERNAL MED   06/08/2016 11:30 AM ALISA Flores   Endocrinology [address] ENDOCRINOLOGY     Signatures   Electronically signed by : Arjun Holguin DO; Jan 21 2016  5:50PM EST                       (Author)

## 2018-01-29 DIAGNOSIS — N32.81 OVERACTIVE BLADDER: Primary | ICD-10-CM

## 2018-01-29 RX ORDER — SOLIFENACIN SUCCINATE 10 MG/1
10 TABLET, FILM COATED ORAL DAILY
Qty: 90 TABLET | Refills: 3 | Status: CANCELLED | OUTPATIENT
Start: 2018-01-29

## 2018-01-29 RX ORDER — SOLIFENACIN SUCCINATE 10 MG/1
5 TABLET, FILM COATED ORAL DAILY
Qty: 90 TABLET | Refills: 3 | Status: SHIPPED | OUTPATIENT
Start: 2018-01-29 | End: 2018-01-30 | Stop reason: SDUPTHER

## 2018-01-29 NOTE — TELEPHONE ENCOUNTER
An Auto-fax Refill Request for Vesicare 10mg was received from the [facility]  Patient was last seen in 10/10/17 and you approved continuation of the medication at that time  Please E-scribe script  Thank you!

## 2018-01-30 DIAGNOSIS — N32.81 OVERACTIVE BLADDER: ICD-10-CM

## 2018-01-30 RX ORDER — SOLIFENACIN SUCCINATE 10 MG/1
5 TABLET, FILM COATED ORAL DAILY
Qty: 90 TABLET | Refills: 2 | Status: SHIPPED | OUTPATIENT
Start: 2018-01-30 | End: 2018-01-30 | Stop reason: SDUPTHER

## 2018-01-30 RX ORDER — SOLIFENACIN SUCCINATE 10 MG/1
10 TABLET, FILM COATED ORAL DAILY
Qty: 90 TABLET | Refills: 0
Start: 2018-01-30 | End: 2018-11-10 | Stop reason: SDUPTHER

## 2018-01-30 NOTE — TELEPHONE ENCOUNTER
[facility] called to clarify Vesicare order E-scribed today and signed by Dr Greg Galeano  Correct dose is 10mg QD not 0 5mg  Order corrected verbally, no need to E-scribed corrected script

## 2018-03-19 ENCOUNTER — LAB (OUTPATIENT)
Dept: LAB | Facility: CLINIC | Age: 78
End: 2018-03-19
Payer: MEDICARE

## 2018-03-19 DIAGNOSIS — E78.5 HYPERLIPIDEMIA: ICD-10-CM

## 2018-03-19 DIAGNOSIS — E11.9 TYPE 2 DIABETES MELLITUS WITHOUT COMPLICATIONS (HCC): ICD-10-CM

## 2018-03-19 LAB
ALBUMIN SERPL BCP-MCNC: 3.7 G/DL (ref 3.5–5)
ALP SERPL-CCNC: 58 U/L (ref 46–116)
ALT SERPL W P-5'-P-CCNC: 20 U/L (ref 12–78)
ANION GAP SERPL CALCULATED.3IONS-SCNC: 8 MMOL/L (ref 4–13)
AST SERPL W P-5'-P-CCNC: 15 U/L (ref 5–45)
BASOPHILS # BLD AUTO: 0.04 THOUSANDS/ΜL (ref 0–0.1)
BASOPHILS NFR BLD AUTO: 0 % (ref 0–1)
BILIRUB SERPL-MCNC: 0.66 MG/DL (ref 0.2–1)
BILIRUB UR QL STRIP: NEGATIVE
BUN SERPL-MCNC: 18 MG/DL (ref 5–25)
CALCIUM SERPL-MCNC: 8.6 MG/DL (ref 8.3–10.1)
CHLORIDE SERPL-SCNC: 106 MMOL/L (ref 100–108)
CHOLEST SERPL-MCNC: 138 MG/DL (ref 50–200)
CLARITY UR: CLEAR
CO2 SERPL-SCNC: 27 MMOL/L (ref 21–32)
COLOR UR: YELLOW
CREAT SERPL-MCNC: 1.35 MG/DL (ref 0.6–1.3)
CREAT UR-MCNC: 72.6 MG/DL
EOSINOPHIL # BLD AUTO: 0.24 THOUSAND/ΜL (ref 0–0.61)
EOSINOPHIL NFR BLD AUTO: 2 % (ref 0–6)
ERYTHROCYTE [DISTWIDTH] IN BLOOD BY AUTOMATED COUNT: 14.1 % (ref 11.6–15.1)
EST. AVERAGE GLUCOSE BLD GHB EST-MCNC: 126 MG/DL
GFR SERPL CREATININE-BSD FRML MDRD: 50 ML/MIN/1.73SQ M
GLUCOSE P FAST SERPL-MCNC: 134 MG/DL (ref 65–99)
GLUCOSE UR STRIP-MCNC: NEGATIVE MG/DL
HBA1C MFR BLD: 6 % (ref 4.2–6.3)
HCT VFR BLD AUTO: 43.7 % (ref 36.5–49.3)
HDLC SERPL-MCNC: 53 MG/DL (ref 40–60)
HGB BLD-MCNC: 15.1 G/DL (ref 12–17)
HGB UR QL STRIP.AUTO: NEGATIVE
KETONES UR STRIP-MCNC: NEGATIVE MG/DL
LDLC SERPL CALC-MCNC: 70 MG/DL (ref 0–100)
LEUKOCYTE ESTERASE UR QL STRIP: NEGATIVE
LYMPHOCYTES # BLD AUTO: 2.19 THOUSANDS/ΜL (ref 0.6–4.47)
LYMPHOCYTES NFR BLD AUTO: 22 % (ref 14–44)
MCH RBC QN AUTO: 31.5 PG (ref 26.8–34.3)
MCHC RBC AUTO-ENTMCNC: 34.6 G/DL (ref 31.4–37.4)
MCV RBC AUTO: 91 FL (ref 82–98)
MICROALBUMIN UR-MCNC: 24.3 MG/L (ref 0–20)
MICROALBUMIN/CREAT 24H UR: 33 MG/G CREATININE (ref 0–30)
MONOCYTES # BLD AUTO: 0.8 THOUSAND/ΜL (ref 0.17–1.22)
MONOCYTES NFR BLD AUTO: 8 % (ref 4–12)
NEUTROPHILS # BLD AUTO: 6.76 THOUSANDS/ΜL (ref 1.85–7.62)
NEUTS SEG NFR BLD AUTO: 68 % (ref 43–75)
NITRITE UR QL STRIP: NEGATIVE
NRBC BLD AUTO-RTO: 0 /100 WBCS
PH UR STRIP.AUTO: 7 [PH] (ref 4.5–8)
PLATELET # BLD AUTO: 179 THOUSANDS/UL (ref 149–390)
PMV BLD AUTO: 12.5 FL (ref 8.9–12.7)
POTASSIUM SERPL-SCNC: 3.8 MMOL/L (ref 3.5–5.3)
PROT SERPL-MCNC: 7.3 G/DL (ref 6.4–8.2)
PROT UR STRIP-MCNC: NEGATIVE MG/DL
RBC # BLD AUTO: 4.8 MILLION/UL (ref 3.88–5.62)
SODIUM SERPL-SCNC: 141 MMOL/L (ref 136–145)
SP GR UR STRIP.AUTO: 1.01 (ref 1–1.03)
TRIGL SERPL-MCNC: 73 MG/DL
UROBILINOGEN UR QL STRIP.AUTO: 0.2 E.U./DL
WBC # BLD AUTO: 10.06 THOUSAND/UL (ref 4.31–10.16)

## 2018-03-19 PROCEDURE — 36415 COLL VENOUS BLD VENIPUNCTURE: CPT

## 2018-03-19 PROCEDURE — 81003 URINALYSIS AUTO W/O SCOPE: CPT

## 2018-03-19 PROCEDURE — 80053 COMPREHEN METABOLIC PANEL: CPT

## 2018-03-19 PROCEDURE — 80061 LIPID PANEL: CPT

## 2018-03-19 PROCEDURE — 85025 COMPLETE CBC W/AUTO DIFF WBC: CPT

## 2018-03-19 PROCEDURE — 83036 HEMOGLOBIN GLYCOSYLATED A1C: CPT

## 2018-03-19 PROCEDURE — 82043 UR ALBUMIN QUANTITATIVE: CPT

## 2018-03-19 PROCEDURE — 82570 ASSAY OF URINE CREATININE: CPT

## 2018-04-05 ENCOUNTER — OFFICE VISIT (OUTPATIENT)
Dept: INTERNAL MEDICINE CLINIC | Facility: CLINIC | Age: 78
End: 2018-04-05
Payer: MEDICARE

## 2018-04-05 VITALS
TEMPERATURE: 97.7 F | HEIGHT: 69 IN | BODY MASS INDEX: 29.86 KG/M2 | OXYGEN SATURATION: 98 % | DIASTOLIC BLOOD PRESSURE: 84 MMHG | WEIGHT: 201.6 LBS | SYSTOLIC BLOOD PRESSURE: 134 MMHG | HEART RATE: 73 BPM

## 2018-04-05 DIAGNOSIS — E11.59 TYPE 2 DIABETES MELLITUS WITH OTHER CIRCULATORY COMPLICATION, WITHOUT LONG-TERM CURRENT USE OF INSULIN (HCC): Primary | ICD-10-CM

## 2018-04-05 DIAGNOSIS — Z00.00 MEDICARE ANNUAL WELLNESS VISIT, SUBSEQUENT: ICD-10-CM

## 2018-04-05 DIAGNOSIS — I10 BENIGN ESSENTIAL HYPERTENSION: ICD-10-CM

## 2018-04-05 DIAGNOSIS — M54.50 CHRONIC BILATERAL LOW BACK PAIN WITHOUT SCIATICA: ICD-10-CM

## 2018-04-05 DIAGNOSIS — G89.29 CHRONIC BILATERAL LOW BACK PAIN WITHOUT SCIATICA: ICD-10-CM

## 2018-04-05 DIAGNOSIS — N40.0 BENIGN PROSTATIC HYPERPLASIA WITHOUT LOWER URINARY TRACT SYMPTOMS: ICD-10-CM

## 2018-04-05 DIAGNOSIS — K20.90 BARRETT'S ESOPHAGUS WITH ESOPHAGITIS: ICD-10-CM

## 2018-04-05 DIAGNOSIS — I25.10 ARTERIOSCLEROTIC CARDIOVASCULAR DISEASE: ICD-10-CM

## 2018-04-05 DIAGNOSIS — K22.70 BARRETT'S ESOPHAGUS WITH ESOPHAGITIS: ICD-10-CM

## 2018-04-05 PROCEDURE — G0439 PPPS, SUBSEQ VISIT: HCPCS | Performed by: INTERNAL MEDICINE

## 2018-04-05 RX ORDER — AMOXICILLIN 500 MG
2 CAPSULE ORAL DAILY
COMMUNITY
End: 2020-08-13 | Stop reason: ALTCHOICE

## 2018-04-05 RX ORDER — IRBESARTAN 300 MG/1
300 TABLET ORAL EVERY MORNING
COMMUNITY
End: 2019-04-25 | Stop reason: SDUPTHER

## 2018-04-05 NOTE — PROGRESS NOTES
HPI:  Radha Perry is a 68 y o  male here for his Subsequent Wellness Visit  There is no problem list on file for this patient  Past Medical History:   Diagnosis Date    Wright esophagus     CAD (coronary artery disease)     Last assessed 09/16/15    Cataract, acquired     Last assessed 10/10/17    Diabetes mellitus ([facility] [address] )     Last assessed 10/10/17    Diabetic neuropathy ([facility] [address] )     Last assessed 10/10/17    Enlarged prostate with lower urinary tract symptoms (LUTS)     Last assessed 10/10/17    GERD (gastroesophageal reflux disease)     Last assessed 10/10/17    Hemoptysis     Last assessed 03/14/16    Hx of CABG     Hypercholesterolemia     Last assessed 10/10/17    Hypertension     Last assessed 10/10/17    Inguinal hernia     Testicular hypogonadism     Last assessed 44/55/98    Umbilical hernia     Last assessed 06/18/14     Past Surgical History:   Procedure Laterality Date    CORONARY ARTERY BYPASS GRAFT  07/16/2014    ABG x 4 LIMA to LAD,SVG to diagnoal 2 SVG to OM-1, SVG to PDA, resection of partial plerual mass    CYSTOSCOPY      Last assessed 10/10/17    INGUINAL HERNIA REPAIR      DE COLONOSCOPY FLX DX W/COLLJ SPEC WHEN PFRMD N/A 4/25/2016    Procedure: COLONOSCOPY;  Surgeon: Sheyla Mondragon MD;  Location: [facility];   Service: Gastroenterology    UMBILICAL HERNIA REPAIR      Last assessed 10/10/17     Family History   Problem Relation Age of Onset   [facility] Cancer Mother     Other Mother      Digestive System Complications    Diabetes Father     Heart disease Father      History   Smoking Status    Former Smoker   Smokeless Tobacco    Not on file     Comment: quit 43 years ago- smoked 20 cigs daily for 13 years and quit in 1972     History   Alcohol Use    0 6 oz/week    1 Shots of liquor per week     Comment: socially- drinks 3-4 times a month      History   Drug Use No     /84   Pulse 73   Temp 97 7 °F (36 5 °C)   Ht 5' 8 5" (1 74 m)   Wt 91 4 kg (201 lb 9 6 oz)   SpO2 98%   BMI 30 21 kg/m²       Current Outpatient Prescriptions   Medication Sig Dispense Refill    aspirin 81 MG tablet Take 81 mg by mouth daily   finasteride (PROSCAR) 5 mg tablet Take 5 mg by mouth daily   irbesartan (AVAPRO) 300 mg tablet Take by mouth      metFORMIN (GLUCOPHAGE) 500 mg tablet Take 500 mg by mouth 2 (two) times a day with meals   metoprolol tartrate (LOPRESSOR) 50 mg tablet Take 50 mg by mouth 2 (two) times a day   Multiple Vitamins-Minerals (CENTRUM ADULTS PO) Take by mouth   Multiple Vitamins-Minerals (CENTRUM SILVER 50+MEN PO) Take by mouth      Omega-3 Fatty Acids (FISH OIL) 1200 MG CAPS Take by mouth      omeprazole (PriLOSEC) 40 MG capsule Take 40 mg by mouth daily   simvastatin (ZOCOR) 20 mg tablet Take 20 mg by mouth daily at bedtime   solifenacin (VESICARE) 10 MG tablet Take 1 tablet (10 mg total) by mouth daily 90 tablet 0    terazosin (HYTRIN) 10 MG capsule Take 10 mg by mouth daily at bedtime  No current facility-administered medications for this visit  Allergies   Allergen Reactions    Morphine And Related      Immunization History   Administered Date(s) Administered    Influenza Split High Dose Preservative Free IM 10/10/2013, 09/22/2014, 10/11/2016    Pneumococcal Polysaccharide PPV23 03/11/2014       Patient Care Team:  Jeremy Romero DO as PCP - MD Alva Padilla PA-C Petra Slim, MD Tonette Barrios, MD Honor Schlichter, MD Armando Form, DO    Medicare Screening Tests and Risk Assessments:  [facility] Clinical     ISAR:   Previous hospitalizations?:  No       Once in a Lifetime Medicare Screening:       Medicare Screening Tests and Risk Assessment:   AAA Risk Assessment    None Indicated:   Yes    Age over 72 (males only):  Yes    Osteoporosis Risk Assessment    HIV Risk Assessment        Drug and Alcohol Use:   Tobacco use    Cigarettes:  former smoker    Smokeless:  never used smokeless tobacco    Tobacco use duration    Tobacco Cessation Readiness    Alcohol use    Alcohol use:  occasional use    Alcohol Treatment Readiness   Illicit Drug Use    Drug use:  never        Diet & Exercise:   Diet   What is your diet?:  Regular   How many servings a day of the following:   Fruits and Vegetables:  1-2 Meat:  1-2, 0   Whole Grains:  0 Simple Carbs:  0, 1   Dairy:  1 Soda:  0, 1    Tea:  0   Exercise    Do you currently exercise?:  yes    Type of exercise:  walking       Cognitive Impairment Screening:   Depression screening preformed:  Yes Depression screen score:  0   Cognitive Impairment Screening    Do you have difficulty learning or retaining new information?:  Yes Do you have difficulty handling new tasks?:  No   Do you have difficulty with reasoning?:  No Do you have difficulty with spatial ability and orientation?:  No   Do you have difficulty with language?:  No Do you have difficulty with behavior?:  No       Functional Ability/Level of Safety:   Hearing    Hearing difficulties:  No    Hearing Impairment Assessment    Hearing status:  No impairment   Current Activities    Status:  limited driving, unlimited social activities, unlimited IADL's, unlimited ADL's   Help needed with the folllowing:    Using the phone:  No Transportation:  No   Shopping:  No Preparing Meals:  No   Doing Housework:  No Doing Laundry:  No   Managing Medications:  No Managing Money:  No   ADL    Feeding:  Independant   Oral hygiene and Facial grooming:  Independant   Bathing:  Independant   Upper Body Dressing:  Independant   Lower Body Dressing:  Independant   Toileting:  Independant   Bed Mobility:  Independant   Fall Risk   Have you fallen in the last 12 months?:  Yes Are you unsteady on your feet?:  No   How many times?:  1 Are you taking any medications that may cause fatigue or dizziness?:  No   Do you have any chronic conditions that may contribute to a fall?:  Diabetes Do you rush to the bathroom potentially risking a fall?:  No   Injury History       Home Safety:   Are there hazards in your environment?:  No   If you fell, would you need help to get back up from the ground?:  No Do you have problems or concerns getting in/out of a bed, chair, tub, or toilet?:  No   Do you feel unsteady when walking?:  No Is your activity limited by pain?:  No   Do you have handrails and grab-bars in the home?:  Yes Are emergency numbers kept by the phone and regularly updated?:  No   Are you and/or family members aware of the dangers of smoking in bed?:  Yes Are firearms stored securely?:  Yes   Do you have working smoke alarms and fire extinguisher?:  Yes Do all household members know how to use them?:  Yes   Have you left the stove on unsupervised?:  No    Home Safety Risk Factors   Unfamilar with surroundings:  No Uneven floors:  No   Stairs or handrail saftey risk:  No Loose rugs:  No   Household clutter:  No Poor household lighting:  No   No grab bars in bathroom:  No Further evaluation needed:  No       Advanced Directives:   Advanced Directives    Living Will:  Yes Durable POA for healthcare:  No   Advanced directive:  Yes    Patient's End of Life Decisions        Urinary Incontinence:   Do you have urinary incontinence?:  Yes Do you have incomplete emptying?:  Yes   Do you urinate frequently?:  Yes Do you have urinary urgency?:  Yes   Do you have urinary hesitancy?:  No Do you have dysuria (painful and/or difficult urination)?:  No   Do you have nocturia (waking up to urinate)?:  Yes Do you strain when urinating (have to push to urinate)?:  No   Do you have a weak stream when urinating?:  No Do you have intermittent streaming when urinating?:  Yes   Do you dribble urine after finishing?:  No        Glaucoma:            Provider Screening     Preventative Screening/Counseling:   Cardiovascular Screening/Counseling:   (Labs Q5 years, EKG optional one-time)   General:  Risks and Benefits Discussed, Screening Current Counseling:  Healthy Diet, Healthy Weight, Improve Cholesterol, Improve Blood Pressure, Improve Exercise Tolerance          Diabetes Screening/Counseling:   (2 tests/year if Pre-Diabetes or 1 test/year if no Diabetes)   General:  Risks and Benefits Discussed, Screening Current Counseling:  Healthy Diet, Healthy Weight, Improve Physical Activity          Colorectal Cancer Screening/Counseling:   (FOBT Q1 yr; Flex Sig Q4 yrs or Q10 yrs after Screening Colonoscopy; Screening Colonoscpy Q2 yrs High Risk or Q10 yrs Low Risk; Barium Enema Q2 yrs High Risk or Q4 yrs Low Risk)   General:  Risks and Benefits Discussed, Patient Declines           Prostate Cancer Screening/Counseling:   (Annual)    General:  Risks and Benefits Discussed, Screening Current          Breast Cancer Screening/Counseling:   (Baseline Age 28 - 43; Annual Age 36+)         Cervical Cancer Screening/Counseling:   (Annual for High Risk or Childbearing Age with Abnormal Pap in Last 3 yrs; Every 2 all others)         Osteoporosis Screening/Counseling:   (Every 2 Yrs if at risk or more if medically necessary)   General:  Risks and Benefits Discussed, Screening Not Indicated           AAA Screening/Counseling:   (Once per Lifetime with risk factors)     Age over 72 (males only):  Yes    General:  Screening Current           Glaucoma Screening/Counseling:   (Annual)   General:  Risks and Benefits Discussed, Screening Current          HIV Screening/Counseling:   (Voluntary; Once annually for high risk OR 3 times for Pregnancy at diagnosis of IUP; 3rd trimester; and at Labor   General:  Screening Not Indicated           Hepatitis C Screening:   Hepatitis C Counseling Provided:   Yes               Immunizations:   Influenza (annual):  Risks & Benefits Discussed, Influenza UTD This Year   Pneumococcal (Once in a Lifetime):  Risks & Benefits Discussed, Lifetime Vaccine Completed   Hepatitis B Series (low risk patients):  Series Not Indicated   Zostavax (Medicare D Coverage, Pt >72 yo):  Risks & Benefits Discussed, Patient Declines   TD (Non-Medicare Wellness  Visit required):  Risks & Benefits Discussed, Patient Declines   Tdap (Non-Medicare Wellness Visit required):  Risks & Benefits Discussed, Patient Declines       Other Preventative Couseling (Non-Medicare Wellness Visit Required):       Referrals (Non-Medicare Wellness Visit Required):       Medical Equipment/Suppliers:           No exam data present    Physical Exam :  Physical Exam   Constitutional: He is oriented to person, place, and time  He appears well-developed and well-nourished  Patient is a very pleasant, overweight 63-year-old male who is awake alert in no acute distress and oriented x3   HENT:   Head: Normocephalic and atraumatic  Right Ear: External ear normal    Left Ear: External ear normal    Nose: Nose normal    Mouth/Throat: Oropharynx is clear and moist  No oropharyngeal exudate  Eyes: Conjunctivae and EOM are normal  Pupils are equal, round, and reactive to light  Right eye exhibits no discharge  Left eye exhibits no discharge  No scleral icterus  Neck: Normal range of motion  Neck supple  No JVD present  No tracheal deviation present  No thyromegaly present  Cardiovascular: Normal rate, regular rhythm, normal heart sounds and intact distal pulses  Exam reveals no gallop and no friction rub  No murmur heard  Pulmonary/Chest: Effort normal and breath sounds normal  No stridor  No respiratory distress  He has no wheezes  He has no rales  He exhibits no tenderness  Abdominal: Soft  Bowel sounds are normal  He exhibits no distension and no mass  There is no tenderness  There is no rebound and no guarding  No hernia  Patient's abdomen is obese soft nontender with positive bowel sounds x4 quadrants    Patient has no organomegaly or masses appreciated on exam   Genitourinary:   Genitourinary Comments: Patient continues to follow-up with his urologist and colon rectal specialist, gastroenterologist   Musculoskeletal: He exhibits edema, tenderness and deformity  Patient on examination today has flattening of his lumbar curve, decreased range of motion to the lumbar spine both passively and actively  Patient has some discomfort with back bending but no pain with forward bending, rotation to side to side  Patient has chronic trace edema bilateral lower extremities, mild degenerative changes to the hands and digits   Lymphadenopathy:     He has no cervical adenopathy  Neurological: He is alert and oriented to person, place, and time  He displays normal reflexes  No cranial nerve deficit or sensory deficit  He exhibits normal muscle tone  Coordination normal    Skin: Skin is warm and dry  Capillary refill takes less than 2 seconds  No rash noted  No erythema  No pallor  Psychiatric: He has a normal mood and affect  His behavior is normal  Judgment and thought content normal    Nursing note and vitals reviewed  Reviewed Updated [facility] Prior Wellness Visits:   Last Medicare wellness visit information was reviewed, patient interviewed , no change since last AWVyes  Last Medicare wellness visit information was reviewed, patient interviewed and updates made to the record today yes    Assessment and Plan:  1  Type 2 diabetes mellitus with other circulatory complication, without long-term current use of insulin ([facility])  Basic metabolic panel    HEMOGLOBIN A1C W/ EAG ESTIMATION   2   Medicare annual wellness visit, subsequent         Health Maintenance Due   Topic Date Due    Depression Screening PHQ-9  07/21/1952    DTaP,Tdap,and Td Vaccines (1 - Tdap) 07/21/1961    GLAUCOMA SCREENING 67+ YR  07/21/2007    INFLUENZA VACCINE  09/01/2017    Diabetic Foot Exam  11/15/2017

## 2018-04-05 NOTE — ASSESSMENT & PLAN NOTE
Atherosclerotic cardiovascular disease  Patient continues to follow-up on a regular basis with his cardiologist   Patient is stable from a cardiac standpoint and he has had no recent studies to evaluate his cardiac function or whether not he has any ischemia  Patient feels that his cardiologist may be retiring in the near future and is looking for suggestions to find a new cardiologist to take over his care

## 2018-04-05 NOTE — ASSESSMENT & PLAN NOTE
Benign essential hypertension  Patient's blood pressure is showing relatively good control  Patient states he has taking his medication as prescribed    He has had full resolution of his cough which may be secondary to the use of an ACE-inhibitor in the past

## 2018-04-05 NOTE — PATIENT INSTRUCTIONS
Thank you for enrolling in [facility]  Please follow the instructions below to securely access your online medical record  MyChart allows you to send messages to your doctor, view your test results, renew your prescriptions, schedule appointments, and more  [address] uses Single Sign on (SSO) Technology for you to log in and access our [facility], including [employer]  No more remembering multiple user names and passwords! We are going to guide you through, step by step, to help you set up your [facility] account which will provide access to your [employer] account  How Do I Sign Up? 1  In your Internet browser, go to [email or web address]       2  Click on the [facility] patient account and then click Dont have an                 Account? Create one now      3  Enter your demographic information and chose a user name (email address) and password  Think of one that is secure and easy to remember  Enter a Referral code if you have one (this is not your [employer] code ) Accept the Terms and Conditions and the Privacy Policy  4  Select your security questions that you will use to reset your password should you forget it  Click Submit  5  Enter your [employer] Activation Code exactly as it appears below  You will not need to use this code after you have completed the sign-up process  If you do not sign up before the expiration date, you must request a new code  [employer] Activation Code: [email or web address]  Expires: 4/8/2018  2:13 PM    6  Confirm your email address  An email confirmation was sent to you  Please open that email and click Confirm your Email   You should then be redirected to our [facility] Single sign on page, where you will log on with the user name and password you created! Proceed to the [employer] Icon to view your personal health information          Additional Information  If you have questions, you can e-mail patient  [email or web address] or call [phone] to talk to our customer support staff  Remember, MyChart is NOT to be used for urgent needs  For medical emergencies, dial 911

## 2018-04-05 NOTE — ASSESSMENT & PLAN NOTE
Diabetes mellitus type 2  Patient has a longstanding history of diabetes and has been released by his endocrinologist because he is showing excellent control of his blood sugars with present treatment  Her recent studies show hemoglobin A1c of 6 5  Patient has a history of peripheral neuropathy and some diabetic retinopathy  Patient will continue with present treatment and surveillance and modification of treatment if necessary in the future  Patient has on recent lab some mild renal insufficiency this will also be followed

## 2018-04-05 NOTE — ASSESSMENT & PLAN NOTE
BPH with a history in the past of lower urinary tract symptoms    Patient states he continues to be followed by his urologist and is up-to-date with routine PSA screening

## 2018-04-05 NOTE — ASSESSMENT & PLAN NOTE
Low back pain  Patient states he has had some recent problems with increased low back pain while he was shoveling snow  I did discuss with the patient seeing a physical therapist for evaluation and he is refusing stating he only has minor pain but he promises to call if worsening of symptoms

## 2018-05-24 ENCOUNTER — HOSPITAL ENCOUNTER (EMERGENCY)
Facility: HOSPITAL | Age: 78
Discharge: HOME | End: 2018-05-25
Attending: EMERGENCY MEDICINE | Admitting: EMERGENCY MEDICINE
Payer: MEDICARE

## 2018-05-24 DIAGNOSIS — R39.15 URINARY URGENCY: ICD-10-CM

## 2018-05-24 DIAGNOSIS — N17.9 AKI (ACUTE KIDNEY INJURY) (HCC): Primary | ICD-10-CM

## 2018-05-24 LAB
BILIRUB UR QL STRIP: NEGATIVE
CLARITY UR: ABNORMAL
COLOR UR: YELLOW
COLOR, POC: NORMAL
GLUCOSE UR STRIP-MCNC: NEGATIVE MG/DL
HGB UR QL STRIP.AUTO: ABNORMAL
KETONES UR STRIP-MCNC: ABNORMAL MG/DL
LEUKOCYTE ESTERASE UR QL STRIP: NEGATIVE
NITRITE UR QL STRIP: NEGATIVE
PH UR STRIP.AUTO: 5.5 [PH] (ref 4.5–8)
PROT UR STRIP-MCNC: >=300 MG/DL
SP GR UR STRIP.AUTO: 1.02 (ref 1–1.03)
UROBILINOGEN UR QL STRIP.AUTO: 0.2 E.U./DL

## 2018-05-24 PROCEDURE — 81001 URINALYSIS AUTO W/SCOPE: CPT

## 2018-05-24 PROCEDURE — 80048 BASIC METABOLIC PNL TOTAL CA: CPT | Performed by: EMERGENCY MEDICINE

## 2018-05-24 PROCEDURE — 36415 COLL VENOUS BLD VENIPUNCTURE: CPT | Performed by: EMERGENCY MEDICINE

## 2018-05-24 PROCEDURE — 87086 URINE CULTURE/COLONY COUNT: CPT

## 2018-05-24 PROCEDURE — 81002 URINALYSIS NONAUTO W/O SCOPE: CPT | Performed by: EMERGENCY MEDICINE

## 2018-05-25 VITALS
DIASTOLIC BLOOD PRESSURE: 81 MMHG | WEIGHT: 200 LBS | OXYGEN SATURATION: 96 % | SYSTOLIC BLOOD PRESSURE: 140 MMHG | RESPIRATION RATE: 20 BRPM | BODY MASS INDEX: 30.31 KG/M2 | TEMPERATURE: 98 F | HEIGHT: 68 IN | HEART RATE: 81 BPM

## 2018-05-25 LAB
ANION GAP SERPL CALCULATED.3IONS-SCNC: 8 MMOL/L (ref 4–13)
BACTERIA UR QL AUTO: ABNORMAL /HPF
BUN SERPL-MCNC: 30 MG/DL (ref 5–25)
CALCIUM SERPL-MCNC: 9.1 MG/DL (ref 8.3–10.1)
CHLORIDE SERPL-SCNC: 106 MMOL/L (ref 100–108)
CO2 SERPL-SCNC: 26 MMOL/L (ref 21–32)
CREAT SERPL-MCNC: 1.88 MG/DL (ref 0.6–1.3)
GFR SERPL CREATININE-BSD FRML MDRD: 34 ML/MIN/1.73SQ M
GLUCOSE SERPL-MCNC: 124 MG/DL (ref 65–140)
NON-SQ EPI CELLS URNS QL MICRO: ABNORMAL /HPF
POTASSIUM SERPL-SCNC: 3.6 MMOL/L (ref 3.5–5.3)
RBC #/AREA URNS AUTO: ABNORMAL /HPF
SODIUM SERPL-SCNC: 140 MMOL/L (ref 136–145)
WBC #/AREA URNS AUTO: ABNORMAL /HPF

## 2018-05-25 PROCEDURE — 96360 HYDRATION IV INFUSION INIT: CPT

## 2018-05-25 PROCEDURE — 99283 EMERGENCY DEPT VISIT LOW MDM: CPT

## 2018-05-25 RX ADMIN — SODIUM CHLORIDE 1000 ML: 0.9 INJECTION, SOLUTION INTRAVENOUS at 00:49

## 2018-05-25 NOTE — ED PROVIDER NOTES
History  Chief Complaint   Patient presents with    Urinary Retention     pt states he feels like he is only peeing 1 oz at a time  Pt states he has an enlarged prostate  HPI  59-year-old male presents with complaints of urinary urgency, frequency and feeling that he is not voiding completely that began earlier today  Patient says he has a history of an enlarged prostate and is on terazosin which he takes at night  Pt sees he is urinating only a few drops at a time  He called his friend who is a urologist and he said it sounds like he has urinary retention secondary to enlarged prostate  Patient otherwise denies dysuria, abdominal pain, fevers, chills, nausea, vomiting, diarrhea, chest pain, shortness of breath  Patient says he has never required a Goins catheter for urinary retention in the past   Twelve systems reviewed otherwise negative except as stated in HPI  On bedside ultrasound bladder is completely empty  Impression and plan 59-year-old male presents with complaints of urinary frequency, urgency and sensation that he is voiding incompletely  He has a history of enlarged prostate is on terazosin  Bladder is empty on bedside ultrasound  I will do a urinalysis as well as check a BMP to assess renal function  I will have the patient follow up with his urologist   Prior to Admission Medications   Prescriptions Last Dose Informant Patient Reported? Taking? Multiple Vitamins-Minerals (CENTRUM SILVER 50+MEN PO) 5/24/2018 at Unknown time  Yes Yes   Sig: Take by mouth   Multiple Vitamins-Minerals (OCUVITE-LUTEIN PO) 5/24/2018 at Unknown time  Yes Yes   Sig: Take by mouth   Omega-3 Fatty Acids (FISH OIL) 1200 MG CAPS 5/24/2018 at Unknown time  Yes Yes   Sig: Take by mouth   aspirin 81 MG tablet 5/23/2018 at Unknown time  Yes Yes   Sig: Take 81 mg by mouth daily  finasteride (PROSCAR) 5 mg tablet 5/23/2018 at Unknown time  Yes Yes   Sig: Take 5 mg by mouth daily     irbesartan (AVAPRO) 300 mg tablet 5/24/2018 at Unknown time  Yes Yes   Sig: Take by mouth   metFORMIN (GLUCOPHAGE) 500 mg tablet 5/24/2018 at Unknown time  Yes Yes   Sig: Take 500 mg by mouth 2 (two) times a day with meals  metoprolol tartrate (LOPRESSOR) 50 mg tablet 5/24/2018 at Unknown time  Yes Yes   Sig: Take 50 mg by mouth 2 (two) times a day  omeprazole (PriLOSEC) 40 MG capsule 5/23/2018 at Unknown time  Yes Yes   Sig: Take 40 mg by mouth daily  simvastatin (ZOCOR) 20 mg tablet 5/23/2018 at Unknown time  Yes Yes   Sig: Take 20 mg by mouth daily at bedtime  solifenacin (VESICARE) 10 MG tablet 5/23/2018 at Unknown time  No Yes   Sig: Take 1 tablet (10 mg total) by mouth daily   terazosin (HYTRIN) 10 MG capsule 5/23/2018 at Unknown time  Yes Yes   Sig: Take 10 mg by mouth daily at bedtime        Facility-Administered Medications: None       Past Medical History:   Diagnosis Date    Wright esophagus     CAD (coronary artery disease)     Last assessed 09/16/15    Cataract, acquired     Last assessed 10/10/17    Diabetes mellitus ([facility] )     Last assessed 10/10/17    Diabetic neuropathy ([facility] )     Last assessed 10/10/17    Enlarged prostate with lower urinary tract symptoms (LUTS)     Last assessed 10/10/17    GERD (gastroesophageal reflux disease)     Last assessed 10/10/17    Hemoptysis     Last assessed 03/14/16    Hx of CABG     Hypercholesterolemia     Last assessed 10/10/17    Hypertension     Last assessed 10/10/17    Inguinal hernia     Testicular hypogonadism     Last assessed 24/41/92    Umbilical hernia     Last assessed 06/18/14       Past Surgical History:   Procedure Laterality Date    CORONARY ARTERY BYPASS GRAFT  07/16/2014    ABG x 4 LIMA to LAD,SVG to diagnoal 2 SVG to OM-1, SVG to PDA, resection of partial plerual mass    CYSTOSCOPY      Last assessed 10/10/17    INGUINAL HERNIA REPAIR      VT COLONOSCOPY FLX DX W/COLLJ SPEC WHEN PFRMD N/A 4/25/2016    Procedure: COLONOSCOPY;  Surgeon: Yenni Barrientos MD;  Location:  GI LAB; Service: Gastroenterology    UMBILICAL HERNIA REPAIR      Last assessed 10/10/17       Family History   Problem Relation Age of Onset   Hema Ricardo Cancer Mother     Other Mother      Digestive System Complications    Diabetes Father     Heart disease Father      I have reviewed and agree with the history as documented  Social History   Substance Use Topics    Smoking status: Former Smoker    Smokeless tobacco: Never Used      Comment: quit 43 years ago- smoked 20 cigs daily for 13 years and quit in 1972    Alcohol use 0 6 oz/week     1 Shots of liquor per week      Comment: socially- drinks 3-4 times a month        Review of Systems   Constitutional: Negative for chills and fever  HENT: Negative for trouble swallowing and voice change  Eyes: Negative for photophobia  Respiratory: Negative for shortness of breath  Cardiovascular: Negative for chest pain, palpitations and leg swelling  Gastrointestinal: Negative for abdominal pain, diarrhea, nausea and vomiting  Endocrine: Negative for polyuria  Genitourinary: Positive for difficulty urinating, frequency and urgency  Negative for decreased urine volume, discharge, dysuria, enuresis, flank pain, genital sores, hematuria, penile pain, penile swelling, scrotal swelling and testicular pain  Musculoskeletal: Negative for back pain and gait problem  Skin: Negative for rash and wound  Allergic/Immunologic: Negative  Neurological: Negative for dizziness, syncope, weakness, numbness and headaches  Psychiatric/Behavioral: Negative for agitation         Physical Exam  ED Triage Vitals [05/24/18 2251]   Temperature Pulse Respirations Blood Pressure SpO2   98 °F (36 7 °C) 103 18 155/83 97 %      Temp Source Heart Rate Source Patient Position - Orthostatic VS BP Location FiO2 (%)   Tympanic Monitor Sitting Left arm --      Pain Score       No Pain           Orthostatic Vital Signs  Vitals:    05/24/18 2251 05/25/18 0051   BP: 155/83 140/81   Pulse: 103 81   Patient Position - Orthostatic VS: Sitting Lying       Physical Exam   Constitutional: He is oriented to person, place, and time  He appears well-developed and well-nourished  No distress  HENT:   Head: Normocephalic and atraumatic  Right Ear: No hemotympanum  Left Ear: No hemotympanum  Nose: No nasal septal hematoma  Mouth/Throat: Uvula is midline and oropharynx is clear and moist  No oropharyngeal exudate  Eyes: EOM are normal  Pupils are equal, round, and reactive to light  Right eye exhibits no discharge  Left eye exhibits no discharge  Neck: Normal range of motion  Neck supple  No JVD present  No tracheal deviation present  Cardiovascular: Normal rate, regular rhythm, normal heart sounds and intact distal pulses  Exam reveals no gallop and no friction rub  No murmur heard  Pulmonary/Chest: Effort normal and breath sounds normal  No stridor  No respiratory distress  He has no wheezes  He has no rales  He exhibits no tenderness  Abdominal: Soft  Bowel sounds are normal  He exhibits no distension and no mass  There is no tenderness  There is no rebound and no guarding  No hernia  No cva tenderness  No suprapubic tenderness   Musculoskeletal: Normal range of motion  He exhibits no edema  Lymphadenopathy:     He has no cervical adenopathy  Neurological: He is alert and oriented to person, place, and time  He has normal strength and normal reflexes  He is not disoriented  No cranial nerve deficit or sensory deficit  GCS eye subscore is 4  GCS verbal subscore is 5  GCS motor subscore is 6  Reflex Scores:       Patellar reflexes are 2+ on the right side and 2+ on the left side  Achilles reflexes are 2+ on the right side and 2+ on the left side  Cn 2-12 grossly intact  No pronator drift  Normal gait  Normal strength/sensation   Skin: Skin is warm and dry  Capillary refill takes less than 2 seconds  He is not diaphoretic  No erythema  No pallor  Psychiatric: He has a normal mood and affect  ED Medications  Medications   sodium chloride 0 9 % bolus 1,000 mL (0 mL Intravenous Stopped 5/25/18 0151)       Diagnostic Studies  Results Reviewed     Procedure Component Value Units Date/Time    Urine Microscopic [[id number]]  (Abnormal) Collected:  05/24/18 2348    Lab Status:  Final result Specimen:  Urine from Urine, Other Updated:  05/25/18 0059     RBC, UA 20-30 (A) /hpf      WBC, UA 10-20 (A) /hpf      Epithelial Cells Moderate (A) /hpf      Bacteria, UA None Seen /hpf     Urine culture [[id number]] Collected:  05/24/18 2348    Lab Status: In process Specimen:  Urine from Urine, Other Updated:  05/25/18 2863    Basic metabolic panel [[id number]]  (Abnormal) Collected:  05/24/18 2338    Lab Status:  Final result Specimen:  Blood from Arm, Right Updated:  05/25/18 0013     Sodium 140 mmol/L      Potassium 3 6 mmol/L      Chloride 106 mmol/L      CO2 26 mmol/L      Anion Gap 8 mmol/L      BUN 30 (H) mg/dL      Creatinine 1 88 (H) mg/dL      Glucose 124 mg/dL      Calcium 9 1 mg/dL      eGFR 34 ml/min/1 73sq m     Narrative:         National Kidney Disease Education Program recommendations are as follows:  GFR calculation is accurate only with a steady state creatinine  Chronic Kidney disease less than 60 ml/min/1 73 sq  meters  Kidney failure less than 15 ml/min/1 73 sq  meters      POCT urinalysis dipstick [[id number]]  (Normal) Resulted:  05/24/18 2343    Lab Status:  Final result Specimen:  Urine Updated:  05/24/18 2344     Color, UA see chart    ED Urine Macroscopic [[id number]]  (Abnormal) Collected:  05/24/18 2348    Lab Status:  Final result Specimen:  Urine Updated:  05/24/18 2342     Color, UA Yellow     Clarity, UA Slightly Cloudy     pH, UA 5 5     Leukocytes, UA Negative     Nitrite, UA Negative     Protein, UA >=300 (A) mg/dl      Glucose, UA Negative mg/dl      Ketones, UA Trace (A) mg/dl      Urobilinogen, UA 0 2 E U /dl      Bilirubin, UA Negative Blood, UA Large (A)     Specific [place], UA 1 025    Narrative:       CLINITEK RESULT                 No orders to display         Procedures  Procedures      Phone Consults  ED Phone Contact    ED Course  ED Course as of May 25 0736   Fri May 25, 2018   0144 We will dc with close f/u to pcp, urology  Return prcuaitons discussed  MDM  CritCare Time    Disposition  Final diagnoses:   KO (acute kidney injury) ([facility] [address] )   Urinary urgency     Time reflects when diagnosis was documented in both MDM as applicable and the Disposition within this note     Time User Action Codes Description Comment    5/25/2018  1:51 AM Paulina PENA Add [N17 9] KO (acute kidney injury) ([facility] [address] )     5/25/2018  1:51 AM Mukesh Bazan Add [R39 15] Urinary urgency       ED Disposition     ED Disposition Condition Comment    Discharge  [address] discharge to home/self care  Condition at discharge: Good        Follow-up Information     Follow up With Specialties Details Why Contact Info Additional [address], DO Internal Medicine In 2 days  [address] [place] [address]  [phone]       your urologist  In 2 days       [address] Emergency Department Emergency Medicine  If symptoms worsen [address]  [phone]  ED, [address], [address]          Discharge Medication List as of 5/25/2018  1:52 AM      CONTINUE these medications which have NOT CHANGED    Details   aspirin 81 MG tablet Take 81 mg by mouth daily  , Until Discontinued, Historical Med      finasteride (PROSCAR) 5 mg tablet Take 5 mg by mouth daily  , Until Discontinued, Historical Med      irbesartan (AVAPRO) 300 mg tablet Take by mouth, Historical Med      metFORMIN (GLUCOPHAGE) 500 mg tablet Take 500 mg by mouth 2 (two) times a day with meals  , Until Discontinued, Historical Med      metoprolol tartrate (LOPRESSOR) 50 mg tablet Take 50 mg by mouth 2 (two) times a day , Until Discontinued, Historical Med      !! Multiple Vitamins-Minerals (CENTRUM SILVER 50+MEN PO) Take by mouth, Historical Med      !! Multiple Vitamins-Minerals (OCUVITE-LUTEIN PO) Take by mouth, Historical Med      Omega-3 Fatty Acids (FISH OIL) 1200 MG CAPS Take by mouth, Historical Med      omeprazole (PriLOSEC) 40 MG capsule Take 40 mg by mouth daily  , Until Discontinued, Historical Med      simvastatin (ZOCOR) 20 mg tablet Take 20 mg by mouth daily at bedtime  , Until Discontinued, Historical Med      solifenacin (VESICARE) 10 MG tablet Take 1 tablet (10 mg total) by mouth daily, Starting Tue 1/30/2018, No Print      terazosin (HYTRIN) 10 MG capsule Take 10 mg by mouth daily at bedtime  , Until Discontinued, Historical Med       !! - Potential duplicate medications found  Please discuss with provider  No discharge procedures on file  ED Provider  Attending physically available and evaluated Jared Schaeffer  MACK managed the patient along with the ED Attending      Electronically Signed by         Patito Moncada MD  05/25/18 8806

## 2018-05-25 NOTE — DISCHARGE INSTRUCTIONS
Acute Kidney Injury   WHAT YOU NEED TO KNOW:   What is acute kidney injury? Acute kidney injury (KO) is also called acute kidney failure, or acute renal failure  KO happens when your kidneys suddenly stop working correctly  Normally, the kidneys remove fluid, chemicals, and waste from your blood  These wastes are turned into urine by your kidneys  KO usually happens over hours or days  When you have KO, your kidneys do not remove the waste, chemicals, or extra fluid from your body  A normal amount of urine is not produced  KO is usually temporary, but it may become a chronic kidney condition  What causes KO? · Decreased blood flow to the kidney, such as from hypercalcemia (high blood calcium level) or severe heart disease     · A disease or condition that affects the kidneys, such as hypertension (high blood pressure) or diabetes     · A blockage in the kidney or ureter, such as a kidney or bladder stone, enlarged prostate, or tumor  What increases my risk for KO? · Being hospitalized with a serious illness, such as sepsis or severe burns    · Peripheral artery disease    · Older age in adults    · Kidney or liver diseases    · Medical conditions such as dehydration, hypertension, diabetes, or heart failure    · Certain medicines such as NSAIDs  What are the signs and symptoms of KO? You may not have any symptoms with early or mild KO  As KO progresses, you may have any of the following:  · Decrease in the amount of urine or no urination    · Swelling in your arms, legs, or feet     · Weakness, drowsiness, or no appetite    · Nausea, flank pain, muscle twitching or muscle cramps    · Itchy skin, or your breath or body smells like urine    · Behavior changes, confusion, disorientation, or seizures  How is KO diagnosed? There are many causes of KO   To find the cause and to treat your KO correctly, your healthcare provider may do any of the following:  · Blood and urine tests  show how well your kidneys are working  They may also show the cause of your KO  · An x-ray or ultrasound  may show problems with your kidneys  Your healthcare provider may see a blockage in your kidneys  He or she may see narrowing of the artery that sends blood to your kidneys  You may be given contrast liquid to help your kidneys show up better in the pictures  Tell the healthcare provider if you have ever had an allergic reaction to contrast liquid  How is KO treated? Treatment depends upon the cause of your acute kidney injury and how severe it is  Usually, KO will be monitored in the hospital  If you have mild KO, you may be able to go home to recover  Your healthcare providers will treat the cause of your KO  You may need IV fluids if your KO was caused by little or no fluid in your body  You may need dialysis to remove waste and extra fluid from your body  Your healthcare provider may tell you to eat food low in sodium (salt), potassium, phosphorus, or protein  You may need to see a dietitian before you are discharged to get help with planning your meals  How can I prevent KO? · Manage other health conditions  such as diabetes, high blood pressure, or heart disease  These conditions increase your risk for acute kidney injury  Take your medicines for these conditions as directed  Also, monitor your blood sugar and blood pressure levels as directed  Contact your healthcare provider if your levels are not in the range he or she says it should be  · Talk to your healthcare provider before you take over-the-counter-medicine  NSAIDs, stomach medicine, or laxatives may harm your kidneys and increase your risk for acute kidney injury  If it is okay to take the medicine, follow the directions on the package  Do not take more than directed  · Tell healthcare providers if you have had KO  before you get contrast liquid for an x-ray or CT scan   Your healthcare provider may give you medicine to prevent kidney problems caused by the liquid  CARE AGREEMENT:   You have the right to help plan your care  Learn about your health condition and how it may be treated  Discuss treatment options with your caregivers to decide what care you want to receive  You always have the right to refuse treatment  The above information is an  only  It is not intended as medical advice for individual conditions or treatments  Talk to your doctor, nurse or pharmacist before following any medical regimen to see if it is safe and effective for you  © 2017 [employer]  Information is for End User's use only and may not be sold, redistributed or otherwise used for commercial purposes  All illustrations and images included in CareNotes® are the copyrighted property of [employer]  or [employer]

## 2018-05-26 LAB — BACTERIA UR CULT: NORMAL

## 2018-05-28 NOTE — ED ATTENDING ATTESTATION
Wagner Porter MD, saw and evaluated the patient  I have discussed the patient with the resident/non-physician practitioner and agree with the resident's/non-physician practitioner's findings, Plan of Care, and MDM as documented in the resident's/non-physician practitioner's note, except where noted  All available labs and Radiology studies were reviewed  At this point I agree with the current assessment done in the Emergency Department  I have conducted an independent evaluation of this patient a history and physical is as follows:   The patient presents with urgency frequency dysuria and small urinary volumes he has been leaking on his underwear he has been using a pad symptoms started less than a day ago he has known prostate enlargement  No fever no chills  No vomiting or diarrhea  Exam well-appearing no acute distress lungs clear heart regular abdomen soft positive bowel sounds no distended bladder is palpable on clinical exam no tenderness is noted  Patient urine shows RBCs     Bladder scan shows empty bladder Will check renal function  Critical Care Time  CritCare Time    Procedures

## 2018-05-30 ENCOUNTER — OFFICE VISIT (OUTPATIENT)
Dept: UROLOGY | Facility: MEDICAL CENTER | Age: 78
End: 2018-05-30
Payer: MEDICARE

## 2018-05-30 VITALS
DIASTOLIC BLOOD PRESSURE: 82 MMHG | SYSTOLIC BLOOD PRESSURE: 144 MMHG | BODY MASS INDEX: 30.92 KG/M2 | WEIGHT: 204 LBS | HEIGHT: 68 IN

## 2018-05-30 DIAGNOSIS — N17.9 ACUTE KIDNEY INJURY (HCC): Primary | ICD-10-CM

## 2018-05-30 LAB
SL AMB  POCT GLUCOSE, UA: NEGATIVE
SL AMB LEUKOCYTE ESTERASE,UA: NEGATIVE
SL AMB POCT BILIRUBIN,UA: NEGATIVE
SL AMB POCT BLOOD,UA: NEGATIVE
SL AMB POCT CLARITY,UA: CLEAR
SL AMB POCT COLOR,UA: YELLOW
SL AMB POCT KETONES,UA: NEGATIVE
SL AMB POCT NITRITE,UA: NEGATIVE
SL AMB POCT PH,UA: 5.5
SL AMB POCT SPECIFIC GRAVITY,UA: <=1.005
SL AMB POCT URINE PROTEIN: NEGATIVE
SL AMB POCT UROBILINOGEN: 0.2

## 2018-05-30 PROCEDURE — 99213 OFFICE O/P EST LOW 20 MIN: CPT | Performed by: UROLOGY

## 2018-05-30 PROCEDURE — 81003 URINALYSIS AUTO W/O SCOPE: CPT | Performed by: UROLOGY

## 2018-05-30 NOTE — PROGRESS NOTES
Assessment/Plan:    Acute kidney injury ([facility] [address] )    BMP pending  I anticipate renal function will return to baseline  Diagnoses and all orders for this visit:    Acute kidney injury ([facility] [address] )  -     POCT urine dip auto non-scope  -     Basic metabolic panel; Future          Subjective:      Patient ID: Ester Jimenez is a 68 y o  male  HPI  Dehydration/KO:  Pt noted he voided minimally on May 24  He went to ER  PVR was minimal  Given IV fluids and voiding returned to baseline  The pt retutrns today seeking a better understanding of just what happened  Pt must have been severely dehydrated for more than a  day for his creatinine and BUN to rise as they did  They should have returned to baseline with creatinine mildly elevated at 1 3X  BMP ordered  Pt encouraged to consume more fluids  No other specific recommendations until we get the BMP  The following portions of the patient's history were reviewed and updated as appropriate: allergies, current medications, past family history, past medical history, past social history, past surgical history and problem list     Review of Systems   Constitutional:        NIDDM  Cardiovascular:        HTN  Objective:      /82 (BP Location: Left arm, Patient Position: Sitting, Cuff Size: Standard)   Ht 5' 8" (1 727 m)   Wt 92 5 kg (204 lb)   BMI 31 02 kg/m²          Physical Exam   Constitutional: He is oriented to person, place, and time  He appears well-developed and well-nourished  Well hydrated  HENT:   Head: Normocephalic and atraumatic  Neck: Normal range of motion  Neck supple  Pulmonary/Chest: Effort normal    Musculoskeletal: Normal range of motion  Neurological: He is alert and oriented to person, place, and time  He has normal reflexes  Skin: Skin is warm and dry  Psychiatric: He has a normal mood and affect   His behavior is normal  Judgment and thought content normal

## 2018-05-30 NOTE — PROGRESS NOTES
IPSS Questionnaire (AUA-7): Over the past month    1)  How often have you had a sensation of not emptying your bladder completely after you finish urinating? 3 - About half the time   2)  How often have you had to urinate again less than two hours after you finished urinating? 3 - About half the time   3)  How often have you found you stopped and started again several times when you urinated? 4 - More than half the time   4) How difficult have you found it to postpone urination? 4 - More than half the time   5) How often have you had a weak urinary stream?  2 - Less than half the time   6) How often have you had to push or strain to begin urination? 1 - Less than 1 time in 5   7) How many times did you most typically get up to urinate from the time you went to bed until the time you got up in the morning? 2 - 2 times   Total Score:  19     QOL: Mixed

## 2018-05-30 NOTE — LETTER
May 30, 2018     Deanna Khan, DO  [address]  [address] [place] [id number]    Patient: Micheal Robert   YOB: 1940   Date of Visit: 5/30/2018       Dear Dr Jony Basurto: Thank you for referring Memo Alvarenga to me for evaluation  Below are my notes for this consultation  If you have questions, please do not hesitate to call me  I look forward to following your patient along with you  Sincerely,        Cruzito Mabry MD        CC: No Recipients  Cruzito Mabry MD  5/30/2018 12:51 PM  Sign at close encounter  Assessment/Plan:    Acute kidney injury ([address] )    BMP pending  I anticipate renal function will return to baseline  Diagnoses and all orders for this visit:    Acute kidney injury ([address] )  -     POCT urine dip auto non-scope  -     Basic metabolic panel; Future          Subjective:      Patient ID: Micheal Robert is a 68 y o  male  HPI  Dehydration/KO:  Pt noted he voided minimally on May 24  He went to ER  PVR was minimal  Given IV fluids and voiding returned to baseline  The pt retutrns today seeking a better understanding of just what happened  Pt must have been severely dehydrated for more than a  day for his creatinine and BUN to rise as they did  They should have returned to baseline with creatinine mildly elevated at 1 3X  BMP ordered  Pt encouraged to consume more fluids  No other specific recommendations until we get the BMP  The following portions of the patient's history were reviewed and updated as appropriate: allergies, current medications, past family history, past medical history, past social history, past surgical history and problem list     Review of Systems   Constitutional:        NIDDM  Cardiovascular:        HTN           Objective:      /82 (BP Location: Left arm, Patient Position: Sitting, Cuff Size: Standard)   Ht 5' 8" (1 727 m)   Wt 92 5 kg (204 lb)   BMI 31 02 kg/m²           Physical Exam Constitutional: He is oriented to person, place, and time  He appears well-developed and well-nourished  Well hydrated  HENT:   Head: Normocephalic and atraumatic  Neck: Normal range of motion  Neck supple  Pulmonary/Chest: Effort normal    Musculoskeletal: Normal range of motion  Neurological: He is alert and oriented to person, place, and time  He has normal reflexes  Skin: Skin is warm and dry  Psychiatric: He has a normal mood and affect   His behavior is normal  Judgment and thought content normal

## 2018-06-01 ENCOUNTER — APPOINTMENT (OUTPATIENT)
Dept: LAB | Facility: CLINIC | Age: 78
End: 2018-06-01
Payer: MEDICARE

## 2018-06-01 DIAGNOSIS — N17.9 ACUTE KIDNEY INJURY (HCC): ICD-10-CM

## 2018-06-01 LAB
ANION GAP SERPL CALCULATED.3IONS-SCNC: 7 MMOL/L (ref 4–13)
BUN SERPL-MCNC: 19 MG/DL (ref 5–25)
CALCIUM SERPL-MCNC: 9.1 MG/DL (ref 8.3–10.1)
CHLORIDE SERPL-SCNC: 106 MMOL/L (ref 100–108)
CO2 SERPL-SCNC: 28 MMOL/L (ref 21–32)
CREAT SERPL-MCNC: 1.26 MG/DL (ref 0.6–1.3)
GFR SERPL CREATININE-BSD FRML MDRD: 55 ML/MIN/1.73SQ M
GLUCOSE P FAST SERPL-MCNC: 126 MG/DL (ref 65–99)
POTASSIUM SERPL-SCNC: 3.6 MMOL/L (ref 3.5–5.3)
SODIUM SERPL-SCNC: 141 MMOL/L (ref 136–145)

## 2018-06-01 PROCEDURE — 36415 COLL VENOUS BLD VENIPUNCTURE: CPT

## 2018-06-01 PROCEDURE — 80048 BASIC METABOLIC PNL TOTAL CA: CPT

## 2018-08-07 ENCOUNTER — OFFICE VISIT (OUTPATIENT)
Dept: INTERNAL MEDICINE CLINIC | Facility: CLINIC | Age: 78
End: 2018-08-07
Payer: MEDICARE

## 2018-08-07 VITALS
TEMPERATURE: 98.6 F | HEART RATE: 69 BPM | BODY MASS INDEX: 30.31 KG/M2 | HEIGHT: 68 IN | WEIGHT: 200 LBS | DIASTOLIC BLOOD PRESSURE: 70 MMHG | OXYGEN SATURATION: 98 % | SYSTOLIC BLOOD PRESSURE: 120 MMHG

## 2018-08-07 DIAGNOSIS — E78.01 FAMILIAL HYPERCHOLESTEROLEMIA: ICD-10-CM

## 2018-08-07 DIAGNOSIS — I10 BENIGN ESSENTIAL HYPERTENSION: ICD-10-CM

## 2018-08-07 DIAGNOSIS — I25.10 ARTERIOSCLEROTIC CARDIOVASCULAR DISEASE: ICD-10-CM

## 2018-08-07 DIAGNOSIS — E11.9 TYPE 2 DIABETES MELLITUS WITHOUT COMPLICATION, WITHOUT LONG-TERM CURRENT USE OF INSULIN (HCC): Primary | ICD-10-CM

## 2018-08-07 DIAGNOSIS — K20.90 BARRETT'S ESOPHAGUS WITH ESOPHAGITIS: ICD-10-CM

## 2018-08-07 DIAGNOSIS — K22.70 BARRETT'S ESOPHAGUS WITH ESOPHAGITIS: ICD-10-CM

## 2018-08-07 DIAGNOSIS — N40.0 BENIGN PROSTATIC HYPERPLASIA WITHOUT LOWER URINARY TRACT SYMPTOMS: ICD-10-CM

## 2018-08-07 DIAGNOSIS — N17.9 ACUTE KIDNEY INJURY (HCC): ICD-10-CM

## 2018-08-07 DIAGNOSIS — G89.29 CHRONIC BILATERAL LOW BACK PAIN WITHOUT SCIATICA: ICD-10-CM

## 2018-08-07 DIAGNOSIS — M54.50 CHRONIC BILATERAL LOW BACK PAIN WITHOUT SCIATICA: ICD-10-CM

## 2018-08-07 PROCEDURE — 99214 OFFICE O/P EST MOD 30 MIN: CPT | Performed by: INTERNAL MEDICINE

## 2018-08-07 NOTE — PROGRESS NOTES
Diabetic Foot Exam    Patient's shoes and socks removed  Right Foot/Ankle   Right Foot Inspection  Skin Exam: skin normal and skin intact no dry skin, no warmth, no callus, no erythema, no maceration, no abnormal color, no pre-ulcer, no ulcer and no callus                          Toe Exam: ROM and strength within normal limitsno swelling, no tenderness, erythema and  no right toe deformity  Sensory   Vibration: diminished  Proprioception: diminished   Monofilament testing: diminished  Vascular  Capillary refills: < 3 seconds  The right DP pulse is 1+  The right PT pulse is 1+  Left Foot/Ankle  Left Foot Inspection  Skin Exam: skin normal and skin intactno dry skin, no warmth, no erythema, no maceration, normal color, no pre-ulcer, no ulcer and no callus                         Toe Exam: ROM and strength within normal limitsno swelling, no tenderness, no erythema and no left toe deformity                   Sensory   Vibration: diminished  Proprioception: diminished  Monofilament: diminished  Vascular  Capillary refills: < 3 seconds  The left DP pulse is 1+  The left PT pulse is 1+  Assign Risk Category:  No deformity present;  Loss of protective sensation; Weak pulses       Risk: 1

## 2018-08-07 NOTE — ASSESSMENT & PLAN NOTE
Patient did have acute kidney injury secondary to dehydration  Her recheck on his labs after patient did work but hydration shows normalization of his kidney function    Again especially in this warmer weather we did reinforce to the patient the importance of hydration

## 2018-08-07 NOTE — ASSESSMENT & PLAN NOTE
Lab Results   Component Value Date    HGBA1C 6 0 03/19/2018       No results for input(s): POCGLU in the last 72 hours  Blood Sugar Average: Last 72 hrs:   patient has a longstanding history of diabetes mellitus type 2  As noted with his last blood sugar reading he was showing good control but he was supposed to have testing done prior to the visit today and neglected to do so  He will have this checked with his lab slip in the next week and have it checked again in 4 months  Included will be a fasting blood sugar, hemoglobin A1c and basic metabolic profile  Patient admits the fact that he is not always perfect with his dietary control of his blood sugars but again his control has been consistent

## 2018-08-07 NOTE — ASSESSMENT & PLAN NOTE
Atherosclerotic cardiovascular disease  Patient states that he is still waiting to make an appointment to be seen by a new cardiologist   He has had no labs performed and no recent testing  We will check a lipid profile prior to his next visit  He denies any chest pain or pressure and no increasing shortness of breath with exertion

## 2018-08-07 NOTE — ASSESSMENT & PLAN NOTE
Wright's esophagus with esophagitis  Patient continues to follow-up with his GI specialist and remains on Prilosec 40 mg daily  Patient has had recurrent endoscopist to make sure that there is no evidence of malignancy

## 2018-08-07 NOTE — PROGRESS NOTES
Assessment/Plan:    DMII (diabetes mellitus, type 2) ([facility] )  Lab Results   Component Value Date    HGBA1C 6 0 03/19/2018       No results for input(s): POCGLU in the last 72 hours  Blood Sugar Average: Last 72 hrs:   patient has a longstanding history of diabetes mellitus type 2  As noted with his last blood sugar reading he was showing good control but he was supposed to have testing done prior to the visit today and neglected to do so  He will have this checked with his lab slip in the next week and have it checked again in 4 months  Included will be a fasting blood sugar, hemoglobin A1c and basic metabolic profile  Patient admits the fact that he is not always perfect with his dietary control of his blood sugars but again his control has been consistent  Arteriosclerotic cardiovascular disease  Atherosclerotic cardiovascular disease  Patient states that he is still waiting to make an appointment to be seen by a new cardiologist   He has had no labs performed and no recent testing  We will check a lipid profile prior to his next visit  He denies any chest pain or pressure and no increasing shortness of breath with exertion  Acute kidney injury [facility])  Patient did have acute kidney injury secondary to dehydration  Her recheck on his labs after patient did work but hydration shows normalization of his kidney function  Again especially in this warmer weather we did reinforce to the patient the importance of hydration    Wright's esophagus with esophagitis  Wright's esophagus with esophagitis  Patient continues to follow-up with his GI specialist and remains on Prilosec 40 mg daily  Patient has had recurrent endoscopist to make sure that there is no evidence of malignancy         Diagnoses and all orders for this visit:    Type 2 diabetes mellitus without complication, without long-term current use of insulin (HCC)  -     Hemoglobin A1C; Future    Wright's esophagus with esophagitis    Arteriosclerotic cardiovascular disease    Benign essential hypertension  -     Basic metabolic panel; Future    Benign prostatic hyperplasia without lower urinary tract symptoms    Acute kidney injury ([facility] )  -     Basic metabolic panel; Future    Chronic bilateral low back pain without sciatica    Familial hypercholesterolemia  -     Lipid panel; Future          Subjective:      Patient ID: Daniel Ng is a 66 y o  male  Patient is a 75-year-old male with a history of atherosclerotic cardiovascular disease status post bypass surgery in the past, Wright's esophagus with esophagitis, BPH with lower tract symptoms, hypertension, diabetes mellitus type 2  Patient is here today for routine follow-up  Patient states he is doing well and he has no new complaints or problems  As noted patient did have normalization of his kidney function after adequate hydration  The following portions of the patient's history were reviewed and updated as appropriate: He  has a past medical history of Acute kidney injury ([facility] ); Wright esophagus; BPH with obstruction/lower urinary tract symptoms; CAD (coronary artery disease); Cataract, acquired; Diabetes mellitus ([address] ); Diabetic neuropathy ([facility] ); Enlarged prostate with lower urinary tract symptoms (LUTS); Erectile dysfunction; GERD (gastroesophageal reflux disease); Hemoptysis; CABG; Hypercholesterolemia; Hypertension; Inguinal hernia; OAB (overactive bladder); Testicular hypofunction; Testicular hypogonadism; Umbilical hernia; and Urge incontinence of urine    He   Patient Active Problem List    Diagnosis Date Noted    Acute kidney injury ([facility] ) 05/30/2018    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    Backache 10/21/2013    Benign prostatic hyperplasia without lower urinary tract symptoms 10/10/2013    Arteriosclerotic cardiovascular disease 10/11/2012    Benign essential hypertension 10/11/2012    DMII (diabetes mellitus, type 2) ([facility] ) 10/11/2012    Hyperlipidemia 10/11/2012     He  has a past surgical history that includes Coronary artery bypass graft (07/16/2014); Inguinal hernia repair (2015); pr colonoscopy flx dx w/collj spec when pfrmd (N/A, 4/25/2016); Cystoscopy (7795); and Umbilical hernia repair (2012)  His family history includes Cancer in his mother; Diabetes in his father; Heart disease in his father; Other in his mother  He  reports that he quit smoking about 46 years ago  He quit after 13 00 years of use  He has never used smokeless tobacco  He reports that he drinks about 0 6 oz of alcohol per week   He reports that he does not use drugs  Current Outpatient Prescriptions   Medication Sig Dispense Refill    aspirin 81 MG tablet Take 81 mg by mouth daily   finasteride (PROSCAR) 5 mg tablet Take 5 mg by mouth daily   irbesartan (AVAPRO) 300 mg tablet Take by mouth      metFORMIN (GLUCOPHAGE) 500 mg tablet Take 500 mg by mouth 2 (two) times a day with meals   metoprolol tartrate (LOPRESSOR) 50 mg tablet Take 50 mg by mouth 2 (two) times a day   Multiple Vitamins-Minerals (CENTRUM SILVER 50+MEN PO) Take by mouth      Multiple Vitamins-Minerals (OCUVITE-LUTEIN PO) Take by mouth      Omega-3 Fatty Acids (FISH OIL) 1200 MG CAPS Take by mouth      omeprazole (PriLOSEC) 40 MG capsule Take 40 mg by mouth daily   simvastatin (ZOCOR) 20 mg tablet Take 20 mg by mouth daily at bedtime   solifenacin (VESICARE) 10 MG tablet Take 1 tablet (10 mg total) by mouth daily 90 tablet 0    terazosin (HYTRIN) 10 MG capsule Take 10 mg by mouth daily at bedtime  No current facility-administered medications for this visit  Current Outpatient Prescriptions on File Prior to Visit   Medication Sig    aspirin 81 MG tablet Take 81 mg by mouth daily   finasteride (PROSCAR) 5 mg tablet Take 5 mg by mouth daily      irbesartan (AVAPRO) 300 mg tablet Take by mouth    metFORMIN (GLUCOPHAGE) 500 mg tablet Take 500 mg by mouth 2 (two) times a day with meals   metoprolol tartrate (LOPRESSOR) 50 mg tablet Take 50 mg by mouth 2 (two) times a day   Multiple Vitamins-Minerals (CENTRUM SILVER 50+MEN PO) Take by mouth    Multiple Vitamins-Minerals (OCUVITE-LUTEIN PO) Take by mouth    Omega-3 Fatty Acids (FISH OIL) 1200 MG CAPS Take by mouth    omeprazole (PriLOSEC) 40 MG capsule Take 40 mg by mouth daily   simvastatin (ZOCOR) 20 mg tablet Take 20 mg by mouth daily at bedtime   solifenacin (VESICARE) 10 MG tablet Take 1 tablet (10 mg total) by mouth daily    terazosin (HYTRIN) 10 MG capsule Take 10 mg by mouth daily at bedtime  No current facility-administered medications on file prior to visit  He is allergic to morphine and related       Review of Systems   Constitutional: Negative  HENT: Negative  Eyes: Positive for visual disturbance (Patient states that he has been diagnosed 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 and is followed closely by his ophthalmologist)  Negative for photophobia, pain, discharge, redness and itching  Respiratory: Negative  Cardiovascular: Negative  Gastrointestinal: Negative  Endocrine: Negative  Genitourinary: Negative  Musculoskeletal: Positive for arthralgias ( patient states he does have some minor diffuse arthritic aches and pains but nothing new or disabling)  Negative for back pain, gait problem, joint swelling, myalgias, neck pain and neck stiffness  Skin: Negative  Allergic/Immunologic: Negative  Neurological: Negative  Hematological: Negative  Objective:      /70   Pulse 69   Temp 98 6 °F (37 °C)   Ht 5' 8" (1 727 m)   Wt 90 7 kg (200 lb)   SpO2 98%   BMI 30 41 kg/m²          Physical Exam   Constitutional: He is oriented to person, place, and time  He appears well-developed and well-nourished  No distress     Pleasant, cheerful, mildly overweight 77-year-old male who is awake alert no acute distress and oriented x3 HENT:   Head: Normocephalic and atraumatic  Right Ear: External ear normal    Left Ear: External ear normal    Nose: Nose normal    Mouth/Throat: Oropharynx is clear and moist  No oropharyngeal exudate  Eyes: Conjunctivae and EOM are normal  Pupils are equal, round, and reactive to light  Right eye exhibits no discharge  Left eye exhibits no discharge  No scleral icterus  Neck: Normal range of motion  No JVD present  No tracheal deviation present  No thyromegaly present  Cardiovascular: Normal rate, regular rhythm, normal heart sounds and intact distal pulses  Exam reveals no gallop and no friction rub  No murmur heard  Pulmonary/Chest: Effort normal and breath sounds normal  No stridor  No respiratory distress  He has no wheezes  He has no rales  He exhibits no tenderness  Abdominal: Soft  Bowel sounds are normal  He exhibits no distension and no mass  There is no tenderness  There is no rebound and no guarding  Musculoskeletal: Normal range of motion  He exhibits no edema, tenderness or deformity  Lymphadenopathy:     He has no cervical adenopathy  Neurological: He is oriented to person, place, and time  He displays abnormal reflex (Patient has absence of both patella and Achilles tendon reflexes bilaterally)  No cranial nerve deficit  He exhibits normal muscle tone  Coordination normal    Skin: Skin is warm and dry  No rash noted  He is not diaphoretic  No erythema  No pallor  Psychiatric: He has a normal mood and affect  His behavior is normal  Judgment and thought content normal    Nursing note and vitals reviewed

## 2018-08-10 ENCOUNTER — LAB (OUTPATIENT)
Dept: LAB | Facility: CLINIC | Age: 78
End: 2018-08-10
Payer: MEDICARE

## 2018-08-10 DIAGNOSIS — N17.9 ACUTE KIDNEY INJURY (HCC): ICD-10-CM

## 2018-08-10 DIAGNOSIS — I10 BENIGN ESSENTIAL HYPERTENSION: ICD-10-CM

## 2018-08-10 DIAGNOSIS — E78.01 FAMILIAL HYPERCHOLESTEROLEMIA: ICD-10-CM

## 2018-08-10 DIAGNOSIS — E11.59 TYPE 2 DIABETES MELLITUS WITH OTHER CIRCULATORY COMPLICATION, WITHOUT LONG-TERM CURRENT USE OF INSULIN (HCC): ICD-10-CM

## 2018-08-10 LAB
ANION GAP SERPL CALCULATED.3IONS-SCNC: 6 MMOL/L (ref 4–13)
BUN SERPL-MCNC: 20 MG/DL (ref 5–25)
CALCIUM SERPL-MCNC: 9 MG/DL (ref 8.3–10.1)
CHLORIDE SERPL-SCNC: 106 MMOL/L (ref 100–108)
CHOLEST SERPL-MCNC: 135 MG/DL (ref 50–200)
CO2 SERPL-SCNC: 26 MMOL/L (ref 21–32)
CREAT SERPL-MCNC: 1.25 MG/DL (ref 0.6–1.3)
EST. AVERAGE GLUCOSE BLD GHB EST-MCNC: 120 MG/DL
GFR SERPL CREATININE-BSD FRML MDRD: 55 ML/MIN/1.73SQ M
GLUCOSE P FAST SERPL-MCNC: 131 MG/DL (ref 65–99)
HBA1C MFR BLD: 5.8 % (ref 4.2–6.3)
HDLC SERPL-MCNC: 53 MG/DL (ref 40–60)
LDLC SERPL CALC-MCNC: 68 MG/DL (ref 0–100)
NONHDLC SERPL-MCNC: 82 MG/DL
POTASSIUM SERPL-SCNC: 4.3 MMOL/L (ref 3.5–5.3)
SODIUM SERPL-SCNC: 138 MMOL/L (ref 136–145)
TRIGL SERPL-MCNC: 71 MG/DL

## 2018-08-10 PROCEDURE — 80061 LIPID PANEL: CPT

## 2018-08-10 PROCEDURE — 80048 BASIC METABOLIC PNL TOTAL CA: CPT

## 2018-08-10 PROCEDURE — 83036 HEMOGLOBIN GLYCOSYLATED A1C: CPT

## 2018-08-10 PROCEDURE — 36415 COLL VENOUS BLD VENIPUNCTURE: CPT

## 2018-08-13 RX ORDER — METFORMIN HYDROCHLORIDE 500 MG/1
TABLET, EXTENDED RELEASE ORAL
Qty: 360 TABLET | Refills: 3 | OUTPATIENT
Start: 2018-08-13

## 2018-08-15 ENCOUNTER — TELEPHONE (OUTPATIENT)
Dept: INTERNAL MEDICINE CLINIC | Facility: CLINIC | Age: 78
End: 2018-08-15

## 2018-08-15 NOTE — TELEPHONE ENCOUNTER
Pt noted that the doctor who was originally taking care of his metformin HCL ER TB24 500mg is no longer caring for the Pt - this is recent since last visit in our office on 8/7/18  Pt asked if you would be able to take over the medication  Pt uses the [facility] and can be reached at [phone] to discuss, if needed

## 2018-08-16 DIAGNOSIS — E11.9 TYPE 2 DIABETES MELLITUS WITHOUT COMPLICATION, WITHOUT LONG-TERM CURRENT USE OF INSULIN (HCC): Primary | ICD-10-CM

## 2018-08-16 RX ORDER — METFORMIN HYDROCHLORIDE 500 MG/1
500 TABLET, EXTENDED RELEASE ORAL 2 TIMES DAILY WITH MEALS
Qty: 180 TABLET | Refills: 3 | Status: SHIPPED | OUTPATIENT
Start: 2018-08-16 | End: 2020-01-30

## 2018-09-19 ENCOUNTER — LAB (OUTPATIENT)
Dept: LAB | Facility: CLINIC | Age: 78
End: 2018-09-19
Payer: MEDICARE

## 2018-09-19 ENCOUNTER — TRANSCRIBE ORDERS (OUTPATIENT)
Dept: LAB | Facility: CLINIC | Age: 78
End: 2018-09-19

## 2018-09-19 DIAGNOSIS — N40.0 ENLARGED PROSTATE WITHOUT LOWER URINARY TRACT SYMPTOMS (LUTS): ICD-10-CM

## 2018-09-19 DIAGNOSIS — E11.9 TYPE 2 DIABETES MELLITUS WITHOUT COMPLICATION, WITHOUT LONG-TERM CURRENT USE OF INSULIN (HCC): ICD-10-CM

## 2018-09-19 LAB
EST. AVERAGE GLUCOSE BLD GHB EST-MCNC: 126 MG/DL
HBA1C MFR BLD: 6 % (ref 4.2–6.3)
PSA SERPL-MCNC: 0.4 NG/ML (ref 0–4)

## 2018-09-19 PROCEDURE — 36415 COLL VENOUS BLD VENIPUNCTURE: CPT

## 2018-09-19 PROCEDURE — 83036 HEMOGLOBIN GLYCOSYLATED A1C: CPT

## 2018-09-19 PROCEDURE — 84153 ASSAY OF PSA TOTAL: CPT

## 2018-10-08 DIAGNOSIS — N13.8 BPH WITH OBSTRUCTION/LOWER URINARY TRACT SYMPTOMS: Primary | ICD-10-CM

## 2018-10-08 DIAGNOSIS — N40.1 BPH WITH OBSTRUCTION/LOWER URINARY TRACT SYMPTOMS: Primary | ICD-10-CM

## 2018-10-09 RX ORDER — FINASTERIDE 5 MG/1
TABLET, FILM COATED ORAL
Qty: 90 TABLET | Refills: 3 | Status: SHIPPED | OUTPATIENT
Start: 2018-10-09 | End: 2019-03-11

## 2018-10-09 RX ORDER — TERAZOSIN 10 MG/1
CAPSULE ORAL
Qty: 90 CAPSULE | Refills: 3 | Status: SHIPPED | OUTPATIENT
Start: 2018-10-09 | End: 2019-09-09

## 2018-10-10 ENCOUNTER — OFFICE VISIT (OUTPATIENT)
Dept: UROLOGY | Facility: MEDICAL CENTER | Age: 78
End: 2018-10-10
Payer: MEDICARE

## 2018-10-10 VITALS
DIASTOLIC BLOOD PRESSURE: 82 MMHG | BODY MASS INDEX: 30.01 KG/M2 | SYSTOLIC BLOOD PRESSURE: 134 MMHG | HEIGHT: 68 IN | WEIGHT: 198 LBS

## 2018-10-10 DIAGNOSIS — N13.8 BPH WITH OBSTRUCTION/LOWER URINARY TRACT SYMPTOMS: Primary | ICD-10-CM

## 2018-10-10 DIAGNOSIS — N40.0 ENLARGED PROSTATE WITHOUT LOWER URINARY TRACT SYMPTOMS (LUTS): ICD-10-CM

## 2018-10-10 DIAGNOSIS — N40.1 BPH WITH OBSTRUCTION/LOWER URINARY TRACT SYMPTOMS: Primary | ICD-10-CM

## 2018-10-10 DIAGNOSIS — N32.81 OVERACTIVE BLADDER: ICD-10-CM

## 2018-10-10 LAB
POST-VOID RESIDUAL VOLUME, ML POC: 46 ML
SL AMB  POCT GLUCOSE, UA: NEGATIVE
SL AMB LEUKOCYTE ESTERASE,UA: NEGATIVE
SL AMB POCT BILIRUBIN,UA: NEGATIVE
SL AMB POCT BLOOD,UA: NEGATIVE
SL AMB POCT CLARITY,UA: CLEAR
SL AMB POCT COLOR,UA: YELLOW
SL AMB POCT KETONES,UA: NEGATIVE
SL AMB POCT NITRITE,UA: NEGATIVE
SL AMB POCT PH,UA: 6
SL AMB POCT SPECIFIC GRAVITY,UA: 1.01
SL AMB POCT URINE PROTEIN: NEGATIVE
SL AMB POCT UROBILINOGEN: 0.2

## 2018-10-10 PROCEDURE — 99213 OFFICE O/P EST LOW 20 MIN: CPT | Performed by: UROLOGY

## 2018-10-10 PROCEDURE — 51798 US URINE CAPACITY MEASURE: CPT | Performed by: UROLOGY

## 2018-10-10 PROCEDURE — 81003 URINALYSIS AUTO W/O SCOPE: CPT | Performed by: UROLOGY

## 2018-10-10 NOTE — LETTER
October 10, 2018     Korina Woods DO  [address]  [address] [place] [id number]    Patient: Lambert Wright   YOB: 1940   Date of Visit: 10/10/2018       Dear Dr Adonis Brown: Thank you for referring Andrea Myles to me for evaluation  Below are my notes for this consultation  If you have questions, please do not hesitate to call me  I look forward to following your patient along with you  Sincerely,        Yan Dalton MD        CC: No Recipients  Yan Dalton MD  10/10/2018  1:58 PM  Sign at close encounter  Assessment/Plan:    BPH with obstruction/lower urinary tract symptoms   The patient has been taking terazosin and finasteride for a long time  His last cystoscopy was 5 years ago  It is time to reassess his prostate and bladder since his current  medication regimen is not working  Diagnoses and all orders for this visit:    BPH with obstruction/lower urinary tract symptoms  -     POCT urine dip auto non-scope  -     POCT Measure PVR    Overactive bladder          Subjective:      Patient ID: Lambert Wright is a 66 y o  male  HPI    BPH:   He notes incomplete emptying, weak stream   He denies other significant urinary symptoms  He denies gross hematuria, urinary tract infections or incontinence  He is taking terazosin and finasteride for his symptoms  The patient's AUA score is up from 17 to 20  His quality of life has deteriorated to levels and he is now "mostly dissatisfied"  Pt has urge incontinence and wears pads at times  They are a nuisance  Meds no longer working for him  Last cysto was in Dec 2013  He had large median lobe then and a component of OAB  AUA SYMPTOM SCORE      Most Recent Value   AUA SYMPTOM SCORE   How often have you had a sensation of not emptying your bladder completely after you finished urinating? 4   How often have you had to urinate again less than two hours after you finished urinating?   2   How often have you found you stopped and started again several times when you urinate? 4   How often have you found it difficult to postpone urination? 3   How often have you had a weak urinary stream?  3   How often have you had to push or strain to begin urination? 2   How many times did you most typically get up to urinate from the time you went to bed at night until the time you got up in the morning? 2   Quality of Life: If you were to spend the rest of your life with your urinary condition just the way it is now, how would you feel about that?  4   AUA SYMPTOM SCORE  20        Current PSA stable at 0 4  PVR=46 cc  OAB:  Taking Vesicare  It is too expensive  Pt may try oxyb  The following portions of the patient's history were reviewed and updated as appropriate: allergies, current medications, past family history, past medical history, past social history, past surgical history and problem list     Review of Systems      Objective:      /82 (BP Location: Left arm, Patient Position: Sitting, Cuff Size: Standard)   Ht 5' 8" (1 727 m)   Wt 89 8 kg (198 lb)   BMI 30 11 kg/m²           Physical Exam   Constitutional: He is oriented to person, place, and time  He appears well-developed and well-nourished  HENT:   Head: Normocephalic and atraumatic  Eyes: EOM are normal    Neck: Normal range of motion  Neck supple  Pulmonary/Chest: Effort normal    Abdominal: Soft  He exhibits no mass  There is no tenderness  Genitourinary: Rectum normal    Genitourinary Comments: The prostate is 30 gm, firm, smooth, non-tender  Nl external genitalia  Musculoskeletal: Normal range of motion  Neurological: He is alert and oriented to person, place, and time  Skin: Skin is warm and dry  Psychiatric: He has a normal mood and affect   His behavior is normal  Judgment and thought content normal

## 2018-10-10 NOTE — PROGRESS NOTES
Assessment/Plan:    BPH with obstruction/lower urinary tract symptoms   The patient has been taking terazosin and finasteride for a long time  His last cystoscopy was 5 years ago  It is time to reassess his prostate and bladder since his current  medication regimen is not working  Diagnoses and all orders for this visit:    BPH with obstruction/lower urinary tract symptoms  -     POCT urine dip auto non-scope  -     POCT Measure PVR    Overactive bladder          Subjective:      Patient ID: Trinity López is a 66 y o  male  HPI    BPH:   He notes incomplete emptying, weak stream   He denies other significant urinary symptoms  He denies gross hematuria, urinary tract infections or incontinence  He is taking terazosin and finasteride for his symptoms  The patient's AUA score is up from 17 to 20  His quality of life has deteriorated to levels and he is now "mostly dissatisfied"  Pt has urge incontinence and wears pads at times  They are a nuisance  Meds no longer working for him  Last cysto was in Dec 2013  He had large median lobe then and a component of OAB  AUA SYMPTOM SCORE      Most Recent Value   AUA SYMPTOM SCORE   How often have you had a sensation of not emptying your bladder completely after you finished urinating? 4   How often have you had to urinate again less than two hours after you finished urinating? 2   How often have you found you stopped and started again several times when you urinate? 4   How often have you found it difficult to postpone urination? 3   How often have you had a weak urinary stream?  3   How often have you had to push or strain to begin urination? 2   How many times did you most typically get up to urinate from the time you went to bed at night until the time you got up in the morning?   2   Quality of Life: If you were to spend the rest of your life with your urinary condition just the way it is now, how would you feel about that?  4   AUA SYMPTOM SCORE  20        Current PSA stable at 0 4  PVR=46 cc  OAB:  Taking Vesicare  It is too expensive  Pt may try oxyb  The following portions of the patient's history were reviewed and updated as appropriate: allergies, current medications, past family history, past medical history, past social history, past surgical history and problem list     Review of Systems      Objective:      /82 (BP Location: Left arm, Patient Position: Sitting, Cuff Size: Standard)   Ht 5' 8" (1 727 m)   Wt 89 8 kg (198 lb)   BMI 30 11 kg/m²          Physical Exam   Constitutional: He is oriented to person, place, and time  He appears well-developed and well-nourished  HENT:   Head: Normocephalic and atraumatic  Eyes: EOM are normal    Neck: Normal range of motion  Neck supple  Pulmonary/Chest: Effort normal    Abdominal: Soft  He exhibits no mass  There is no tenderness  Genitourinary: Rectum normal    Genitourinary Comments: The prostate is 30 gm, firm, smooth, non-tender  Nl external genitalia  Musculoskeletal: Normal range of motion  Neurological: He is alert and oriented to person, place, and time  Skin: Skin is warm and dry  Psychiatric: He has a normal mood and affect   His behavior is normal  Judgment and thought content normal

## 2018-10-10 NOTE — ASSESSMENT & PLAN NOTE
The patient has been taking terazosin and finasteride for a long time  His last cystoscopy was 5 years ago  It is time to reassess his prostate and bladder since his current  medication regimen is not working

## 2018-11-01 ENCOUNTER — PROCEDURE VISIT (OUTPATIENT)
Dept: UROLOGY | Facility: MEDICAL CENTER | Age: 78
End: 2018-11-01
Payer: MEDICARE

## 2018-11-01 VITALS — WEIGHT: 198 LBS | HEIGHT: 68 IN | BODY MASS INDEX: 30.01 KG/M2

## 2018-11-01 DIAGNOSIS — D49.4 BLADDER TUMOR: ICD-10-CM

## 2018-11-01 DIAGNOSIS — N32.81 OVERACTIVE BLADDER: ICD-10-CM

## 2018-11-01 DIAGNOSIS — N40.1 BENIGN PROSTATIC HYPERPLASIA WITH LOWER URINARY TRACT SYMPTOMS, SYMPTOM DETAILS UNSPECIFIED: Primary | ICD-10-CM

## 2018-11-01 LAB
SL AMB  POCT GLUCOSE, UA: ABNORMAL
SL AMB LEUKOCYTE ESTERASE,UA: ABNORMAL
SL AMB POCT BILIRUBIN,UA: ABNORMAL
SL AMB POCT BLOOD,UA: ABNORMAL
SL AMB POCT CLARITY,UA: CLEAR
SL AMB POCT COLOR,UA: YELLOW
SL AMB POCT KETONES,UA: ABNORMAL
SL AMB POCT NITRITE,UA: ABNORMAL
SL AMB POCT PH,UA: 5.5
SL AMB POCT SPECIFIC GRAVITY,UA: 1.01
SL AMB POCT URINE PROTEIN: ABNORMAL
SL AMB POCT UROBILINOGEN: 0.2

## 2018-11-01 PROCEDURE — 99213 OFFICE O/P EST LOW 20 MIN: CPT | Performed by: UROLOGY

## 2018-11-01 PROCEDURE — 81003 URINALYSIS AUTO W/O SCOPE: CPT | Performed by: UROLOGY

## 2018-11-01 PROCEDURE — 52000 CYSTOURETHROSCOPY: CPT | Performed by: UROLOGY

## 2018-11-01 NOTE — PROGRESS NOTES
Assessment/Plan:    Benign prostatic hyperplasia with lower urinary tract symptoms   The patient has 2 urinary tract problems  The 1st this prostatic hyperplasia with obstruction and the 2nd is a bladder tumor  Both of reached a point where they will require endoscopic surgery, and I would like to try to accomplish both operations during 1 visit to the operating room  I think his prostate would be a reasonable candidate for UroLift  A volume study has been scheduled  Assuming he is a UroLift candidate, the transurethral resectionof his bladder tumorcan be undertaken at the same time  If he requires a GreenLight or formal transurethral resection of prostate tissue, the bladder tumor can be taking care of then, as well  Diagnoses and all orders for this visit:    Benign prostatic hyperplasia with lower urinary tract symptoms, symptom details unspecified  -     POCT urine dip auto non-scope    Bladder tumor  -     CT abdomen pelvis w contrast; Future  -     Basic metabolic panel; Future    Overactive bladder          Subjective:      Patient ID: Floyd Dial is a 66 y o  male  HPI  BPH:    Procedures  MALE FLEXIBLE CYSTOSCOPY    Preoperative diagnosis:  BPH with obstruction    Postoperative diagnosis:    Same, 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    Operation:  Flexible cystoscopy    Surgeon:  Bambi Pepe MD,FACS    Anesthesia:  Xylocaine jelly    Procedure:  Patient was brought to the cystoscopy room and positively identified  The patient was prepped and draped in sterile fashion  Ten mL of 1% xylocaine jelly was instilled into the urethra  A penile clamp was applied  The flexible cystoscope was inserted  Findings are as follows:    Penile Urethra:normal  Bulbar Urethra:normal  Prostate:   Occlusive for approximately 3 cm by symmetrically enlarged lateral lobes and a fairly large median lobe    The prostate part bulges well into the bladder making it difficult to visualize the trigone with the flexible scope  Bladder: The bladder neck is normal  Ureteral orifices are in normal  position  The mucosa is abnormal   There is a papillary tumor approximately 1 cm in diameter on the right lateral wall  The surrounding mucosa is somewhat hyperemic  The remainder of the bladder mucosa is pale and of normal color and vascularity  There is moderate trabeculation  The cystoscope was removed  The patient tolerated the procedure well  Following additional consultation to review the findings with the patient, he was discharged from the office in satisfactory condition  Impression:  BPH with obstruction  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  The following portions of the patient's history were reviewed and updated as appropriate: allergies, current medications, past family history, past medical history, past social history, past surgical history and problem list     Review of Systems   Constitutional: Negative for activity change and fatigue  Respiratory: Negative for shortness of breath and wheezing  Cardiovascular: Negative for chest pain  Hypertension  Gastrointestinal: Negative for abdominal pain  Endocrine:        Non-insulin  Dependent diabetes   Genitourinary: Negative for difficulty urinating, dysuria, frequency, hematuria and urgency  Musculoskeletal: Negative for back pain and gait problem  Skin: Negative  Allergic/Immunologic: Negative  Neurological: Negative  Psychiatric/Behavioral: Negative  Objective:      Ht 5' 8" (1 727 m)   Wt 89 8 kg (198 lb)   BMI 30 11 kg/m²          Physical Exam   Constitutional: He is oriented to person, place, and time  He appears well-developed and well-nourished  HENT:   Head: Normocephalic and atraumatic  Eyes: EOM are normal    Neck: Normal range of motion     Pulmonary/Chest: Effort normal    Genitourinary: Penis normal    Genitourinary Comments:  Normal external genitalia  Musculoskeletal: Normal range of motion  Neurological: He is alert and oriented to person, place, and time  Skin: Skin is warm and dry  Psychiatric: He has a normal mood and affect   His behavior is normal  Judgment and thought content normal

## 2018-11-01 NOTE — ASSESSMENT & PLAN NOTE
The patient has 2 urinary tract problems  The 1st this prostatic hyperplasia with obstruction and the 2nd is a bladder tumor  Both of reached a point where they will require endoscopic surgery, and I would like to try to accomplish both operations during 1 visit to the operating room  I think his prostate would be a reasonable candidate for UroLift  A volume study has been scheduled  Assuming he is a UroLift candidate, the transurethral resectionof his bladder tumorcan be undertaken at the same time  If he requires a [facility]Light or formal transurethral resection of prostate tissue, the bladder tumor can be taking care of then, as well

## 2018-11-01 NOTE — LETTER
November 1, 2018     Dereje Emmanuel DO  [address]  [address] [place] [id number]    Patient: Trinity López   YOB: 1940   Date of Visit: 11/1/2018       Dear Dr Cabello [place]: Thank you for referring Patrizia Eller to me for evaluation  Below are my notes for this consultation  If you have questions, please do not hesitate to call me  I look forward to following your patient along with you  Sincerely,        Marcelle Umanzor MD        CC: No Recipients  Marcelle Umanzor MD  11/1/2018  1:02 PM  Sign at close encounter  Assessment/Plan:    Benign prostatic hyperplasia with lower urinary tract symptoms   The patient has 2 urinary tract problems  The 1st this prostatic hyperplasia with obstruction and the 2nd is a bladder tumor  Both of reached a point where they will require endoscopic surgery, and I would like to try to accomplish both operations during 1 visit to the operating room  I think his prostate would be a reasonable candidate for UroLift  A volume study has been scheduled  Assuming he is a UroLift candidate, the transurethral resectionof his bladder tumorcan be undertaken at the same time  If he requires a GreenLight or formal transurethral resection of prostate tissue, the bladder tumor can be taking care of then, as well  Diagnoses and all orders for this visit:    Benign prostatic hyperplasia with lower urinary tract symptoms, symptom details unspecified  -     POCT urine dip auto non-scope    Bladder tumor  -     CT abdomen pelvis w contrast; Future  -     Basic metabolic panel; Future    Overactive bladder          Subjective:      Patient ID: Trinity López is a 66 y o  male      HPI  BPH:    Procedures  MALE FLEXIBLE CYSTOSCOPY    Preoperative diagnosis:  BPH with obstruction    Postoperative diagnosis:    Same, 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    Operation:  Flexible cystoscopy    Surgeon:  Michael Nye MD,FACS    Anesthesia:  Xylocaine jelly    Procedure:  Patient was brought to the cystoscopy room and positively identified  The patient was prepped and draped in sterile fashion  Ten mL of 1% xylocaine jelly was instilled into the urethra  A penile clamp was applied  The flexible cystoscope was inserted  Findings are as follows:    Penile Urethra:normal  Bulbar Urethra:normal  Prostate:   Occlusive for approximately 3 cm by symmetrically enlarged lateral lobes and a fairly large median lobe  The prostate part bulges well into the bladder making it difficult to visualize the trigone with the flexible scope  Bladder: The bladder neck is normal  Ureteral orifices are in normal  position  The mucosa is abnormal   There is a papillary tumor approximately 1 cm in diameter on the right lateral wall  The surrounding mucosa is somewhat hyperemic  The remainder of the bladder mucosa is pale and of normal color and vascularity  There is moderate trabeculation  The cystoscope was removed  The patient tolerated the procedure well  Following additional consultation to review the findings with the patient, he was discharged from the office in satisfactory condition  Impression:  BPH with obstruction  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  The following portions of the patient's history were reviewed and updated as appropriate: allergies, current medications, past family history, past medical history, past social history, past surgical history and problem list     Review of Systems   Constitutional: Negative for activity change and fatigue  Respiratory: Negative for shortness of breath and wheezing  Cardiovascular: Negative for chest pain  Hypertension  Gastrointestinal: Negative for abdominal pain  Endocrine:        Non-insulin  Dependent diabetes   Genitourinary: Negative for difficulty urinating, dysuria, frequency, hematuria and urgency     Musculoskeletal: Negative for back pain and gait problem  Skin: Negative  Allergic/Immunologic: Negative  Neurological: Negative  Psychiatric/Behavioral: Negative  Objective:      Ht 5' 8" (1 727 m)   Wt 89 8 kg (198 lb)   BMI 30 11 kg/m²           Physical Exam   Constitutional: He is oriented to person, place, and time  He appears well-developed and well-nourished  HENT:   Head: Normocephalic and atraumatic  Eyes: EOM are normal    Neck: Normal range of motion  Pulmonary/Chest: Effort normal    Genitourinary: Penis normal    Genitourinary Comments:  Normal external genitalia  Musculoskeletal: Normal range of motion  Neurological: He is alert and oriented to person, place, and time  Skin: Skin is warm and dry  Psychiatric: He has a normal mood and affect   His behavior is normal  Judgment and thought content normal

## 2018-11-10 DIAGNOSIS — N32.81 OVERACTIVE BLADDER: ICD-10-CM

## 2018-11-12 ENCOUNTER — APPOINTMENT (OUTPATIENT)
Dept: LAB | Facility: CLINIC | Age: 78
End: 2018-11-12
Payer: MEDICARE

## 2018-11-12 ENCOUNTER — TRANSCRIBE ORDERS (OUTPATIENT)
Dept: LAB | Facility: CLINIC | Age: 78
End: 2018-11-12

## 2018-11-12 DIAGNOSIS — D49.4 BLADDER TUMOR: ICD-10-CM

## 2018-11-12 DIAGNOSIS — E11.9 TYPE 2 DIABETES MELLITUS WITHOUT COMPLICATION, UNSPECIFIED WHETHER LONG TERM INSULIN USE (HCC): Primary | ICD-10-CM

## 2018-11-12 DIAGNOSIS — E11.9 TYPE 2 DIABETES MELLITUS WITHOUT COMPLICATION, UNSPECIFIED WHETHER LONG TERM INSULIN USE (HCC): ICD-10-CM

## 2018-11-12 LAB
ANION GAP SERPL CALCULATED.3IONS-SCNC: 7 MMOL/L (ref 4–13)
BUN SERPL-MCNC: 19 MG/DL (ref 5–25)
CALCIUM SERPL-MCNC: 8.7 MG/DL (ref 8.3–10.1)
CHLORIDE SERPL-SCNC: 105 MMOL/L (ref 100–108)
CO2 SERPL-SCNC: 26 MMOL/L (ref 21–32)
CREAT SERPL-MCNC: 1.27 MG/DL (ref 0.6–1.3)
EST. AVERAGE GLUCOSE BLD GHB EST-MCNC: 123 MG/DL
GFR SERPL CREATININE-BSD FRML MDRD: 54 ML/MIN/1.73SQ M
GLUCOSE P FAST SERPL-MCNC: 147 MG/DL (ref 65–99)
HBA1C MFR BLD: 5.9 % (ref 4.2–6.3)
POTASSIUM SERPL-SCNC: 3.8 MMOL/L (ref 3.5–5.3)
SODIUM SERPL-SCNC: 138 MMOL/L (ref 136–145)

## 2018-11-12 PROCEDURE — 36415 COLL VENOUS BLD VENIPUNCTURE: CPT

## 2018-11-12 PROCEDURE — 83036 HEMOGLOBIN GLYCOSYLATED A1C: CPT

## 2018-11-12 PROCEDURE — 80048 BASIC METABOLIC PNL TOTAL CA: CPT

## 2018-11-12 RX ORDER — SOLIFENACIN SUCCINATE 10 MG/1
TABLET, FILM COATED ORAL
Qty: 90 TABLET | Refills: 2 | Status: SHIPPED | OUTPATIENT
Start: 2018-11-12 | End: 2019-08-27 | Stop reason: SDUPTHER

## 2018-11-15 ENCOUNTER — HOSPITAL ENCOUNTER (OUTPATIENT)
Dept: RADIOLOGY | Facility: HOSPITAL | Age: 78
Discharge: HOME | End: 2018-11-15
Attending: UROLOGY
Payer: MEDICARE

## 2018-11-15 DIAGNOSIS — D49.4 BLADDER TUMOR: ICD-10-CM

## 2018-11-15 PROCEDURE — 74177 CT ABD & PELVIS W/CONTRAST: CPT

## 2018-11-15 RX ADMIN — IODIXANOL 100 ML: 320 INJECTION, SOLUTION INTRAVASCULAR at 14:21

## 2018-11-29 ENCOUNTER — PROCEDURE VISIT (OUTPATIENT)
Dept: UROLOGY | Facility: MEDICAL CENTER | Age: 78
End: 2018-11-29
Payer: MEDICARE

## 2018-11-29 VITALS
WEIGHT: 198 LBS | SYSTOLIC BLOOD PRESSURE: 132 MMHG | HEIGHT: 68 IN | BODY MASS INDEX: 30.01 KG/M2 | DIASTOLIC BLOOD PRESSURE: 80 MMHG | HEART RATE: 68 BPM

## 2018-11-29 DIAGNOSIS — D49.4 BLADDER TUMOR: ICD-10-CM

## 2018-11-29 DIAGNOSIS — N40.1 BENIGN PROSTATIC HYPERPLASIA WITH LOWER URINARY TRACT SYMPTOMS, SYMPTOM DETAILS UNSPECIFIED: ICD-10-CM

## 2018-11-29 DIAGNOSIS — R35.0 URINARY FREQUENCY: Primary | ICD-10-CM

## 2018-11-29 PROCEDURE — 99215 OFFICE O/P EST HI 40 MIN: CPT | Performed by: UROLOGY

## 2018-11-29 PROCEDURE — 81003 URINALYSIS AUTO W/O SCOPE: CPT | Performed by: UROLOGY

## 2018-11-29 PROCEDURE — 76872 US TRANSRECTAL: CPT | Performed by: UROLOGY

## 2018-11-29 NOTE — LETTER
November 29, 2018     Vilma Mandujano DO  [address]  [address] [place] [id number]    Patient: Lucian Oppenheim   YOB: 1940   Date of Visit: 11/29/2018       Dear Dr Donna Valdivia: Thank you for referring Amarilis Bryson to me for evaluation  Below are my notes for this consultation  If you have questions, please do not hesitate to call me  I look forward to following your patient along with you  Sincerely,        Tarsha Samayoa MD        CC: No Recipients  Tarsha Samayoa MD  11/29/2018  3:16 PM  Sign at close encounter  Assessment/Plan:    No problem-specific Assessment & Plan notes found for this encounter  Diagnoses and all orders for this visit:    Urinary frequency    Benign prostatic hyperplasia with lower urinary tract symptoms, symptom details unspecified  -     Case request operating room: TRANSURETHRAL RESECTION OF PROSTATE (TURP) [facility]    TURBT; Standing  -     Case request operating room: [address] [place] [address] (TURP) [facility]    TURBT    Bladder tumor  -     Case request operating room: TRANSURETHRAL RESECTION OF PROSTATE (TURP) [facility]    TURBT; Standing  -     Case request operating room: [address] [place] [address] (TURP) [facility]    TURBT    Other orders  -     Diet NPO; Sips with meds; Standing  -     Place sequential compression device; Standing  -     Comprehensive metabolic panel; Standing  -     CBC and Platelet; Standing  -     ceFAZolin (ANCEF) 2,000 mg in dextrose 5 % 100 mL IVPB; Infuse 2,000 mg into a venous catheter once   -     metroNIDAZOLE (FLAGYL) IVPB (premix) 500 mg; Infuse 100 mL (500 mg total) into a venous catheter once           Subjective:      Patient ID: Lucian Oppenheim is a 66 y o  male  HPI  BPH:   He notes incomplete emptying, weak stream   He denies other significant urinary symptoms  He denies gross hematuria, urinary tract infections or incontinence    He is taking terazosin and finasteride for his symptoms  Meds no longer working for him        The patient's AUA score is up from 17 to 20  His quality of life has deteriorated to levels and he is now "mostly dissatisfied"  Pt has urge incontinence and wears pads at times  They are a nuisance  AUA SYMPTOM SCORE      Most Recent Value   AUA SYMPTOM SCORE   How often have you had a sensation of not emptying your bladder completely after you finished urinating? 3   How often have you had to urinate again less than two hours after you finished urinating? 3   How often have you found you stopped and started again several times when you urinate? 2   How often have you found it difficult to postpone urination? 3   How often have you had a weak urinary stream?  2   How often have you had to push or strain to begin urination? 1   How many times did you most typically get up to urinate from the time you went to bed at night until the time you got up in the morning? 2   Quality of Life: If you were to spend the rest of your life with your urinary condition just the way it is now, how would you feel about that?  4   AUA SYMPTOM SCORE  16            Procedures  PROSTATE ULTRASOUND WITHOUT BIOPSY FOR UROLIFT PRE-OP EVALUATION    PREOPERATIVE DIAGNOSIS:    BPH with obstruction    POSTOPERATIVE DIAGNOSIS:  Same    OPERATION: Prostate ultrasound volume measurements    SURGEON:   Diane Richardson MD,FACS    PROCEDURE:    The patient was brought to the procedure room and positively identified  He was placed with his left side down  A prostate ultrasound was carried out  Measurements were taken of the width, depth and length of the prostate  No hypoechoic lesions were identified  The prostate volume was 35 grams  On ultrasound, the median lobe was seen bulging into the urinary bladder      This tissue will be difficult to manage with the UroLift and the patient would be better served with a GreenLight or formal transurethral resection  The procedure was completed  The patient tolerated well  He was discharged home in satisfactory condition  Impression:  See today's progress note    Bladder cancer: At the time of the patient's workup for BPH, he was found to have a 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  The following portions of the patient's history were reviewed and updated as appropriate: allergies, current medications, past family history, past medical history, past social history, past surgical history and problem list     Review of Systems    Constitutional: Negative for activity change and fatigue  Respiratory: Negative for shortness of breath and wheezing  Cardiovascular: Negative for chest pain  Hypertension  Gastrointestinal: Negative for abdominal pain  Endocrine:        Non-insulin  Dependent diabetes   Genitourinary: Negative for difficulty urinating, dysuria, frequency, hematuria and urgency  Musculoskeletal: Negative for back pain and gait problem  Skin: Negative  Allergic/Immunologic: Negative  Neurological: Negative  Psychiatric/Behavioral: Negative  Objective:      /80 (BP Location: Left arm, Patient Position: Sitting)   Pulse 68   Ht 5' 8" (1 727 m)   Wt 89 8 kg (198 lb)   BMI 30 11 kg/m²           Physical Exam   Constitutional: He is oriented to person, place, and time  He appears well-developed and well-nourished  HENT:   Head: Normocephalic and atraumatic  Eyes: EOM are normal    Neck: Normal range of motion  Neck supple  Cardiovascular: Normal rate, regular rhythm and normal heart sounds  Pulmonary/Chest: Effort normal and breath sounds normal    Abdominal: Soft  There is no tenderness  Genitourinary:   Genitourinary Comments: The prostate is approximately 30 g, firm, smooth, non-tender  Musculoskeletal: Normal range of motion     Neurological: He is alert and oriented to person, place, and time  Skin: Skin is warm and dry  Psychiatric: He has a normal mood and affect   His behavior is normal  Judgment and thought content normal

## 2018-11-29 NOTE — PROGRESS NOTES
Assessment/Plan:    No problem-specific Assessment & Plan notes found for this encounter  Diagnoses and all orders for this visit:    Urinary frequency    Benign prostatic hyperplasia with lower urinary tract symptoms, symptom details unspecified  -     Case request operating room: TRANSURETHRAL RESECTION OF PROSTATE (TURP) [facility]    TURBT; Standing  -     Case request operating room: [address] [place] [address] (TURP) [facility]    TURBT    Bladder tumor  -     Case request operating room: TRANSURETHRAL RESECTION OF PROSTATE (TURP) [facility]    TURBT; Standing  -     Case request operating room: [address] [place] [address] (TURP) [facility]    TURBT    Other orders  -     Diet NPO; Sips with meds; Standing  -     Place sequential compression device; Standing  -     Comprehensive metabolic panel; Standing  -     CBC and Platelet; Standing  -     ceFAZolin (ANCEF) 2,000 mg in dextrose 5 % 100 mL IVPB; Infuse 2,000 mg into a venous catheter once   -     metroNIDAZOLE (FLAGYL) IVPB (premix) 500 mg; Infuse 100 mL (500 mg total) into a venous catheter once           Subjective:      Patient ID: Esther Murillo is a 66 y o  male  HPI  BPH:   He notes incomplete emptying, weak stream   He denies other significant urinary symptoms  He denies gross hematuria, urinary tract infections or incontinence  He is taking terazosin and finasteride for his symptoms  Meds no longer working for him        The patient's AUA score is up from 17 to 20  His quality of life has deteriorated to levels and he is now "mostly dissatisfied"  Pt has urge incontinence and wears pads at times  They are a nuisance  AUA SYMPTOM SCORE      Most Recent Value   AUA SYMPTOM SCORE   How often have you had a sensation of not emptying your bladder completely after you finished urinating? 3   How often have you had to urinate again less than two hours after you finished urinating?   3   How often have you found you stopped and started again several times when you urinate? 2   How often have you found it difficult to postpone urination? 3   How often have you had a weak urinary stream?  2   How often have you had to push or strain to begin urination? 1   How many times did you most typically get up to urinate from the time you went to bed at night until the time you got up in the morning? 2   Quality of Life: If you were to spend the rest of your life with your urinary condition just the way it is now, how would you feel about that?  4   AUA SYMPTOM SCORE  16            Procedures  PROSTATE ULTRASOUND WITHOUT BIOPSY FOR UROLIFT PRE-OP EVALUATION    PREOPERATIVE DIAGNOSIS:    BPH with obstruction    POSTOPERATIVE DIAGNOSIS:  Same    OPERATION: Prostate ultrasound volume measurements    SURGEON:   Farheen Peguero MD,FACS    PROCEDURE:    The patient was brought to the procedure room and positively identified  He was placed with his left side down  A prostate ultrasound was carried out  Measurements were taken of the width, depth and length of the prostate  No hypoechoic lesions were identified  The prostate volume was 35 grams  On ultrasound, the median lobe was seen bulging into the urinary bladder  This tissue will be difficult to manage with the UroLift and the patient would be better served with a GreenLight or formal transurethral resection  The procedure was completed  The patient tolerated well  He was discharged home in satisfactory condition  Impression:  See today's progress note    Bladder cancer: At the time of the patient's workup for BPH, he was found to have a 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        The following portions of the patient's history were reviewed and updated as appropriate: allergies, current medications, past family history, past medical history, past social history, past surgical history and problem list     Review of Systems    Constitutional: Negative for activity change and fatigue  Respiratory: Negative for shortness of breath and wheezing  Cardiovascular: Negative for chest pain  Hypertension  Gastrointestinal: Negative for abdominal pain  Endocrine:        Non-insulin  Dependent diabetes   Genitourinary: Negative for difficulty urinating, dysuria, frequency, hematuria and urgency  Musculoskeletal: Negative for back pain and gait problem  Skin: Negative  Allergic/Immunologic: Negative  Neurological: Negative  Psychiatric/Behavioral: Negative  Objective:      /80 (BP Location: Left arm, Patient Position: Sitting)   Pulse 68   Ht 5' 8" (1 727 m)   Wt 89 8 kg (198 lb)   BMI 30 11 kg/m²          Physical Exam   Constitutional: He is oriented to person, place, and time  He appears well-developed and well-nourished  HENT:   Head: Normocephalic and atraumatic  Eyes: EOM are normal    Neck: Normal range of motion  Neck supple  Cardiovascular: Normal rate, regular rhythm and normal heart sounds  Pulmonary/Chest: Effort normal and breath sounds normal    Abdominal: Soft  There is no tenderness  Genitourinary:   Genitourinary Comments: The prostate is approximately 30 g, firm, smooth, non-tender  Musculoskeletal: Normal range of motion  Neurological: He is alert and oriented to person, place, and time  Skin: Skin is warm and dry  Psychiatric: He has a normal mood and affect   His behavior is normal  Judgment and thought content normal

## 2018-11-29 NOTE — H&P
Assessment/Plan:    No problem-specific Assessment & Plan notes found for this encounter  Diagnoses and all orders for this visit:    Urinary frequency    Benign prostatic hyperplasia with lower urinary tract symptoms, symptom details unspecified  -     Case request operating room: TRANSURETHRAL RESECTION OF PROSTATE (TURP) [facility]    TURBT; Standing  -     Case request operating room: [address] [place] [address] (TURP) [facility]    TURBT    Bladder tumor  -     Case request operating room: TRANSURETHRAL RESECTION OF PROSTATE (TURP) [facility]    TURBT; Standing  -     Case request operating room: [address] [place] [address] (TURP) [facility]    TURBT    Other orders  -     Diet NPO; Sips with meds; Standing  -     Place sequential compression device; Standing  -     Comprehensive metabolic panel; Standing  -     CBC and Platelet; Standing  -     ceFAZolin (ANCEF) 2,000 mg in dextrose 5 % 100 mL IVPB; Infuse 2,000 mg into a venous catheter once   -     metroNIDAZOLE (FLAGYL) IVPB (premix) 500 mg; Infuse 100 mL (500 mg total) into a venous catheter once           Subjective:      Patient ID: Jorge Samuels is a 66 y o  male  HPI  BPH:   He notes incomplete emptying, weak stream   He denies other significant urinary symptoms  He denies gross hematuria, urinary tract infections or incontinence  He is taking terazosin and finasteride for his symptoms  Meds no longer working for him        The patient's AUA score is up from 17 to 20  His quality of life has deteriorated to levels and he is now "mostly dissatisfied"  Pt has urge incontinence and wears pads at times  They are a nuisance  AUA SYMPTOM SCORE      Most Recent Value   AUA SYMPTOM SCORE   How often have you had a sensation of not emptying your bladder completely after you finished urinating? 3   How often have you had to urinate again less than two hours after you finished urinating?   3   How often have you found you stopped and started again several times when you urinate? 2   How often have you found it difficult to postpone urination? 3   How often have you had a weak urinary stream?  2   How often have you had to push or strain to begin urination? 1   How many times did you most typically get up to urinate from the time you went to bed at night until the time you got up in the morning? 2   Quality of Life: If you were to spend the rest of your life with your urinary condition just the way it is now, how would you feel about that?  4   AUA SYMPTOM SCORE  16            Procedures  PROSTATE ULTRASOUND WITHOUT BIOPSY FOR UROLIFT PRE-OP EVALUATION    PREOPERATIVE DIAGNOSIS:    BPH with obstruction    POSTOPERATIVE DIAGNOSIS:  Same    OPERATION: Prostate ultrasound volume measurements    SURGEON:   Jessica Kolb MD,FACS    PROCEDURE:    The patient was brought to the procedure room and positively identified  He was placed with his left side down  A prostate ultrasound was carried out  Measurements were taken of the width, depth and length of the prostate  No hypoechoic lesions were identified  The prostate volume was 35 grams  On ultrasound, the median lobe was seen bulging into the urinary bladder  This tissue will be difficult to manage with the UroLift and the patient would be better served with a GreenLight or formal transurethral resection  The procedure was completed  The patient tolerated well  He was discharged home in satisfactory condition  Impression:  See today's progress note    Bladder cancer: At the time of the patient's workup for BPH, he was found to have a 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        The following portions of the patient's history were reviewed and updated as appropriate: allergies, current medications, past family history, past medical history, past social history, past surgical history and problem list     Review of Systems    Constitutional: Negative for activity change and fatigue  Respiratory: Negative for shortness of breath and wheezing  Cardiovascular: Negative for chest pain  Hypertension  Gastrointestinal: Negative for abdominal pain  Endocrine:        Non-insulin  Dependent diabetes   Genitourinary: Negative for difficulty urinating, dysuria, frequency, hematuria and urgency  Musculoskeletal: Negative for back pain and gait problem  Skin: Negative  Allergic/Immunologic: Negative  Neurological: Negative  Psychiatric/Behavioral: Negative  Objective:      /80 (BP Location: Left arm, Patient Position: Sitting)   Pulse 68   Ht 5' 8" (1 727 m)   Wt 89 8 kg (198 lb)   BMI 30 11 kg/m²           Physical Exam   Constitutional: He is oriented to person, place, and time  He appears well-developed and well-nourished  HENT:   Head: Normocephalic and atraumatic  Eyes: EOM are normal    Neck: Normal range of motion  Neck supple  Cardiovascular: Normal rate, regular rhythm and normal heart sounds  Pulmonary/Chest: Effort normal and breath sounds normal    Abdominal: Soft  There is no tenderness  Genitourinary:   Genitourinary Comments: The prostate is approximately 30 g, firm, smooth, non-tender  Musculoskeletal: Normal range of motion  Neurological: He is alert and oriented to person, place, and time  Skin: Skin is warm and dry  Psychiatric: He has a normal mood and affect   His behavior is normal  Judgment and thought content normal

## 2018-11-30 ENCOUNTER — TELEPHONE (OUTPATIENT)
Dept: UROLOGY | Facility: MEDICAL CENTER | Age: 78
End: 2018-11-30

## 2018-11-30 NOTE — TELEPHONE ENCOUNTER
I spoke to the patient and reschedule him to 12/20/18 [facility]  New Instruction form Emailed to patient

## 2018-11-30 NOTE — TELEPHONE ENCOUNTER
Patient asking if he can safely postpone his scheduled procedure? Patient scheduled for 12/13/18 and would like to push it off a week due to pre scheduled plans  Patient also has questions regarding maintaining a sweet Catheter on a daily basis

## 2018-11-30 NOTE — TELEPHONE ENCOUNTER
The patient can wait a week with the catheter in place and postpone his procedure safely I think  It really depends on whether Dr Brisa Pleitez is schedule can accommodate the patient  If not, he will need a formal transurethral resection with an overnight stay by 1 of the other partners

## 2018-12-05 PROBLEM — E11.3293 TYPE 2 DIABETES MELLITUS WITH MILD NONPROLIFERATIVE RETINOPATHY OF BOTH EYES WITHOUT MACULAR EDEMA (HCC): Status: ACTIVE | Noted: 2018-12-05

## 2018-12-07 ENCOUNTER — ANESTHESIA EVENT (OUTPATIENT)
Dept: PERIOP | Facility: HOSPITAL | Age: 78
End: 2018-12-07
Payer: MEDICARE

## 2018-12-07 RX ORDER — SODIUM CHLORIDE 9 MG/ML
125 INJECTION, SOLUTION INTRAVENOUS CONTINUOUS
Status: CANCELLED | OUTPATIENT
Start: 2018-12-20

## 2018-12-10 ENCOUNTER — HOSPITAL ENCOUNTER (OUTPATIENT)
Dept: NON INVASIVE DIAGNOSTICS | Facility: HOSPITAL | Age: 78
Discharge: HOME | End: 2018-12-10
Attending: UROLOGY
Payer: MEDICARE

## 2018-12-10 ENCOUNTER — APPOINTMENT (OUTPATIENT)
Dept: LAB | Facility: HOSPITAL | Age: 78
End: 2018-12-10
Attending: UROLOGY
Payer: MEDICARE

## 2018-12-10 ENCOUNTER — APPOINTMENT (OUTPATIENT)
Dept: PREADMISSION TESTING | Facility: HOSPITAL | Age: 78
End: 2018-12-10
Payer: MEDICARE

## 2018-12-10 DIAGNOSIS — N13.8 ENLARGED PROSTATE WITH URINARY OBSTRUCTION: ICD-10-CM

## 2018-12-10 DIAGNOSIS — D49.4 BLADDER TUMOR: ICD-10-CM

## 2018-12-10 DIAGNOSIS — N40.1 ENLARGED PROSTATE WITH URINARY OBSTRUCTION: ICD-10-CM

## 2018-12-10 LAB
ALBUMIN SERPL BCP-MCNC: 3.6 G/DL (ref 3.5–5)
ALP SERPL-CCNC: 62 U/L (ref 46–116)
ALT SERPL W P-5'-P-CCNC: 22 U/L (ref 12–78)
ANION GAP SERPL CALCULATED.3IONS-SCNC: 12 MMOL/L (ref 4–13)
APTT PPP: 27 SECONDS (ref 26–38)
AST SERPL W P-5'-P-CCNC: 19 U/L (ref 5–45)
BASOPHILS # BLD AUTO: 0.03 THOUSANDS/ΜL (ref 0–0.1)
BASOPHILS NFR BLD AUTO: 0 % (ref 0–1)
BILIRUB SERPL-MCNC: 0.59 MG/DL (ref 0.2–1)
BUN SERPL-MCNC: 25 MG/DL (ref 5–25)
CALCIUM SERPL-MCNC: 9.2 MG/DL (ref 8.3–10.1)
CHLORIDE SERPL-SCNC: 103 MMOL/L (ref 100–108)
CO2 SERPL-SCNC: 23 MMOL/L (ref 21–32)
CREAT SERPL-MCNC: 1.56 MG/DL (ref 0.6–1.3)
EOSINOPHIL # BLD AUTO: 0.12 THOUSAND/ΜL (ref 0–0.61)
EOSINOPHIL NFR BLD AUTO: 1 % (ref 0–6)
ERYTHROCYTE [DISTWIDTH] IN BLOOD BY AUTOMATED COUNT: 13.4 % (ref 11.6–15.1)
GFR SERPL CREATININE-BSD FRML MDRD: 42 ML/MIN/1.73SQ M
GLUCOSE SERPL-MCNC: 205 MG/DL (ref 65–140)
HCT VFR BLD AUTO: 41.5 % (ref 36.5–49.3)
HGB BLD-MCNC: 14 G/DL (ref 12–17)
IMM GRANULOCYTES # BLD AUTO: 0.04 THOUSAND/UL (ref 0–0.2)
IMM GRANULOCYTES NFR BLD AUTO: 0 % (ref 0–2)
INR PPP: 1.02 (ref 0.86–1.17)
LYMPHOCYTES # BLD AUTO: 1.23 THOUSANDS/ΜL (ref 0.6–4.47)
LYMPHOCYTES NFR BLD AUTO: 14 % (ref 14–44)
MCH RBC QN AUTO: 31.7 PG (ref 26.8–34.3)
MCHC RBC AUTO-ENTMCNC: 33.7 G/DL (ref 31.4–37.4)
MCV RBC AUTO: 94 FL (ref 82–98)
MONOCYTES # BLD AUTO: 0.76 THOUSAND/ΜL (ref 0.17–1.22)
MONOCYTES NFR BLD AUTO: 8 % (ref 4–12)
NEUTROPHILS # BLD AUTO: 6.92 THOUSANDS/ΜL (ref 1.85–7.62)
NEUTS SEG NFR BLD AUTO: 77 % (ref 43–75)
NRBC BLD AUTO-RTO: 0 /100 WBCS
PLATELET # BLD AUTO: 159 THOUSANDS/UL (ref 149–390)
PMV BLD AUTO: 12.1 FL (ref 8.9–12.7)
POTASSIUM SERPL-SCNC: 4.4 MMOL/L (ref 3.5–5.3)
PROT SERPL-MCNC: 7.3 G/DL (ref 6.4–8.2)
PROTHROMBIN TIME: 13.5 SECONDS (ref 11.8–14.2)
RBC # BLD AUTO: 4.41 MILLION/UL (ref 3.88–5.62)
SODIUM SERPL-SCNC: 138 MMOL/L (ref 136–145)
WBC # BLD AUTO: 9.1 THOUSAND/UL (ref 4.31–10.16)

## 2018-12-10 PROCEDURE — 36415 COLL VENOUS BLD VENIPUNCTURE: CPT

## 2018-12-10 PROCEDURE — 85025 COMPLETE CBC W/AUTO DIFF WBC: CPT

## 2018-12-10 PROCEDURE — 85610 PROTHROMBIN TIME: CPT

## 2018-12-10 PROCEDURE — 80053 COMPREHEN METABOLIC PANEL: CPT

## 2018-12-10 PROCEDURE — 87086 URINE CULTURE/COLONY COUNT: CPT

## 2018-12-10 PROCEDURE — 85730 THROMBOPLASTIN TIME PARTIAL: CPT

## 2018-12-10 PROCEDURE — 93005 ELECTROCARDIOGRAM TRACING: CPT

## 2018-12-10 NOTE — ANESTHESIA PREPROCEDURE EVALUATION
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, CAD ,    Pulmonary  Negative pulmonary ROS        GI/Hepatic    GERD well controlled,        Negative  ROS        Endo/Other  Diabetes type 2 Oral agent,      GYN  Negative gynecology ROS          Hematology  Negative hematology ROS      Musculoskeletal    Arthritis     Neurology  Negative neurology ROS      Psychology           Physical Exam    Airway    Mallampati score: II  TM Distance: <3 FB  Neck ROM: full     Dental       Cardiovascular  Rhythm: regular, Rate: normal,     Pulmonary  Breath sounds clear to auscultation,     Other Findings  caps      Anesthesia Plan  ASA Score- 3     Anesthesia Type- general with ASA Monitors  Additional Monitors:   Airway Plan:         Plan Factors- Patient instructed to abstain from smoking on day of procedure  Patient did not smoke on day of surgery  Induction- intravenous  Postoperative Plan-     Informed Consent- Anesthetic plan and risks discussed with patient

## 2018-12-10 NOTE — PRE-PROCEDURE INSTRUCTIONS
Pre-Surgery Instructions:   Medication Instructions    aspirin 81 MG tablet Patient was instructed by Physician and understands   finasteride (PROSCAR) 5 mg tablet Patient was instructed by Physician and understands   irbesartan (AVAPRO) 300 mg tablet Patient was instructed by Physician and understands   metFORMIN (GLUCOPHAGE-XR) 500 mg 24 hr tablet Patient was instructed by Physician and understands   metoprolol tartrate (LOPRESSOR) 50 mg tablet Patient was instructed by Physician and understands   Multiple Vitamins-Minerals (CENTRUM SILVER 50+MEN PO) Patient was instructed by Physician and understands   Multiple Vitamins-Minerals (OCUVITE-LUTEIN PO) Patient was instructed by Physician and understands   Omega-3 Fatty Acids (FISH OIL) 1200 MG CAPS Patient was instructed by Physician and understands   omeprazole (PriLOSEC) 40 MG capsule Patient was instructed by Physician and understands   simvastatin (ZOCOR) 20 mg tablet Patient was instructed by Physician and understands   terazosin (HYTRIN) 10 MG capsule Patient was instructed by Physician and understands   VESICARE 10 MG tablet Patient was instructed by Physician and understands  Seen by Dr Aguila Lei and patient states he stopped Aspirin per surgeon's instructions and he was told to take Metoprolol on DOS with sip of water and stop NSAIDS and supplements one week preop  Instructed on use of Chlorhexidine for preoperative bathing

## 2018-12-11 ENCOUNTER — TELEPHONE (OUTPATIENT)
Dept: UROLOGY | Facility: MEDICAL CENTER | Age: 78
End: 2018-12-11

## 2018-12-11 ENCOUNTER — OFFICE VISIT (OUTPATIENT)
Dept: INTERNAL MEDICINE CLINIC | Facility: CLINIC | Age: 78
End: 2018-12-11
Payer: MEDICARE

## 2018-12-11 VITALS
BODY MASS INDEX: 30.46 KG/M2 | HEART RATE: 76 BPM | DIASTOLIC BLOOD PRESSURE: 68 MMHG | OXYGEN SATURATION: 98 % | TEMPERATURE: 97.7 F | WEIGHT: 201 LBS | SYSTOLIC BLOOD PRESSURE: 144 MMHG | HEIGHT: 68 IN

## 2018-12-11 DIAGNOSIS — I10 BENIGN ESSENTIAL HYPERTENSION: ICD-10-CM

## 2018-12-11 DIAGNOSIS — E11.59 TYPE 2 DIABETES MELLITUS WITH OTHER CIRCULATORY COMPLICATION, WITHOUT LONG-TERM CURRENT USE OF INSULIN (HCC): ICD-10-CM

## 2018-12-11 DIAGNOSIS — I25.10 ARTERIOSCLEROTIC CARDIOVASCULAR DISEASE: ICD-10-CM

## 2018-12-11 DIAGNOSIS — D49.4 BLADDER TUMOR: ICD-10-CM

## 2018-12-11 DIAGNOSIS — E11.3293 TYPE 2 DIABETES MELLITUS WITH BOTH EYES AFFECTED BY MILD NONPROLIFERATIVE RETINOPATHY WITHOUT MACULAR EDEMA, WITHOUT LONG-TERM CURRENT USE OF INSULIN (HCC): ICD-10-CM

## 2018-12-11 DIAGNOSIS — N28.9 RENAL INSUFFICIENCY: Primary | ICD-10-CM

## 2018-12-11 DIAGNOSIS — N17.9 ACUTE KIDNEY INJURY (HCC): ICD-10-CM

## 2018-12-11 LAB
ATRIAL RATE: 74 BPM
BACTERIA UR CULT: NORMAL
PR INTERVAL: 204 MS
QRS AXIS: -33 DEGREES
QRSD INTERVAL: 136 MS
QT INTERVAL: 418 MS
QTC INTERVAL: 463 MS
T WAVE AXIS: 34 DEGREES
VENTRICULAR RATE: 74 BPM

## 2018-12-11 PROCEDURE — 99214 OFFICE O/P EST MOD 30 MIN: CPT | Performed by: INTERNAL MEDICINE

## 2018-12-11 PROCEDURE — 93010 ELECTROCARDIOGRAM REPORT: CPT | Performed by: INTERNAL MEDICINE

## 2018-12-11 NOTE — ASSESSMENT & PLAN NOTE
Patient recently had CT scan of the abdomen for evaluation assessment for his bladder as and prostate  Labs that were performed for pre-admission testing shows a decline in his renal function  He also states that no one told him to stop his metformin prior to the procedure that was performed, patient receiving contrast dye  We do have concerns about the patient's renal function and his acute decline  He did have an acute insult over the last year with the patient having an acute gastroenteritis, dehydration  We will be checking his renal function later in the week nonfasting and with the patient hydrating himself well prior to this  We will have consult made to be seen by Nephrology if he has no improvement in his kidney function

## 2018-12-11 NOTE — ASSESSMENT & PLAN NOTE
Patient has not seen his cardiologist in extended period of time  We are trying to arrange for the patient to be seen by another cardiologist to take over his care    He denies any chest pain or increasing shortness of breath or any other cardiac symptoms at this time

## 2018-12-11 NOTE — TELEPHONE ENCOUNTER
----- Message from Shlomo Nicole MD sent at 12/10/2018  5:06 PM EST -----  Please call the patient regarding his abnormal result  His creatinine is a little high  He should discuss this with his family doctor at his December 18th visit

## 2018-12-11 NOTE — ASSESSMENT & PLAN NOTE
Patient is following up with Urology and is to undergo a procedure in the near future, a concern is possible bladder tumor    Again consideration must be made of recent changes in his renal function with this surgical procedure and patient must be well hydrated before and after the procedure and metformin should be stopped prior to the procedure at least 24 hr and only restarted 24 hr after procedures performed

## 2018-12-11 NOTE — PATIENT INSTRUCTIONS
Low Fat Diet   AMBULATORY CARE:   A low-fat diet  is an eating plan that is low in total fat, unhealthy fat, and cholesterol  You may need to follow a low-fat diet if you have trouble digesting or absorbing fat  You may also need to follow this diet if you have high cholesterol  You can also lower your cholesterol by increasing the amount of fiber in your diet  Soluble fiber is a type of fiber that helps to decrease cholesterol levels  Different types of fat in food:   · Limit unhealthy fats  A diet that is high in cholesterol, saturated fat, and trans fat may cause unhealthy cholesterol levels  Unhealthy cholesterol levels increase your risk of heart disease  ¨ Cholesterol:  Limit intake of cholesterol to less than 200 mg per day  Cholesterol is found in meat, eggs, and dairy  ¨ Saturated fat:  Limit saturated fat to less than 7% of your total daily calories  Ask your dietitian how many calories you need each day  Saturated fat is found in butter, cheese, ice cream, whole milk, and palm oil  Saturated fat is also found in meat, such as beef, pork, chicken skin, and processed meats  Processed meats include sausage, hot dogs, and bologna  ¨ Trans fat:  Avoid trans fat as much as possible  Trans fat is used in fried and baked foods  Foods that say trans fat free on the label may still have up to 0 5 grams of trans fat per serving  · Include healthy fats  Replace foods that are high in saturated and trans fat with foods high in healthy fats  This may help to decrease high cholesterol levels  ¨ Monounsaturated fats: These are found in avocados, nuts, and vegetable oils, such as olive, canola, and sunflower oil  ¨ Polyunsaturated fats: These can be found in vegetable oils, such as soybean or corn oil  Omega-3 fats can help to decrease the risk of heart disease  Omega-3 fats are found in fish, such as salmon, herring, trout, and tuna   Omega-3 fats can also be found in plant foods, such as walnuts, flaxseed, soybeans, and canola oil    Foods to limit or avoid:   · Grains:      ¨ Snacks that are made with partially hydrogenated oils, such as chips, regular crackers, and butter-flavored popcorn    ¨ High-fat baked goods, such as biscuits, croissants, doughnuts, pies, cookies, and pastries    · Dairy:      ¨ Whole milk, 2% milk, and yogurt and ice cream made with whole milk    ¨ Half and half creamer, heavy cream, and whipping cream    ¨ Cheese, cream cheese, and sour cream    · Meats and proteins:      ¨ High-fat cuts of meat (T-bone steak, regular hamburger, and ribs)    ¨ Fried meat, poultry (turkey and chicken), and fish    ¨ Poultry (chicken and turkey) with skin    ¨ Cold cuts (salami or bologna), hot dogs, torres, and sausage    ¨ Whole eggs and egg yolks    · Vegetables and fruits with added fat:      ¨ Fried vegetables or vegetables in butter or high-fat sauces, such as cream or cheese sauces    ¨ Fried fruit or fruit served with butter or cream    · Fats:      ¨ Butter, stick margarine, and shortening    ¨ Coconut, palm oil, and palm kernel oil  Foods to include:   · Grains:      ¨ Whole-grain breads, cereals, pasta, and brown rice    ¨ Low-fat crackers and pretzels    · Vegetables and fruits:      ¨ Fresh, frozen, or canned vegetables (no salt or low-sodium)    ¨ Fresh, frozen, dried, or canned fruit (canned in light syrup or fruit juice)    ¨ Avocado    · Low-fat dairy products:      ¨ Nonfat (skim) or 1% milk    ¨ Nonfat or low-fat cheese, yogurt, and cottage cheese    · Meats and proteins:      ¨ Chicken or turkey with no skin    ¨ Baked or broiled fish    ¨ Lean beef and pork (loin, round, extra lean hamburger)    ¨ Beans and peas, unsalted nuts, soy products    ¨ Egg whites and substitutes    ¨ Seeds and nuts    · Fats:      ¨ Unsaturated oil, such as canola, olive, peanut, soybean, or sunflower oil    ¨ Soft or liquid margarine and vegetable oil spread    ¨ Low-fat salad dressing  Other ways to decrease fat:   · Read food labels before you buy foods  Choose foods that have less than 30% of calories from fat  Choose low-fat or fat-free dairy products  Remember that fat free does not mean calorie free  These foods still contain calories, and too many calories can lead to weight gain  · Trim fat from meat and avoid fried food  Trim all visible fat from meat before you cook it  Remove the skin from poultry  Do not perez meat, fish, or poultry  Bake, roast, boil, or broil these foods instead  Avoid fried foods  Eat a baked potato instead of Western Allie fries  Steam vegetables instead of sautéing them in butter  · Add less fat to foods  Use imitation torres bits on salads and baked potatoes instead of regular torres bits  Use fat-free or low-fat salad dressings instead of regular dressings  Use low-fat or nonfat butter-flavored topping instead of regular butter or margarine on popcorn and other foods  Ways to decrease fat in recipes:  Replace high-fat ingredients with low-fat or nonfat ones  This may cause baked goods to be drier than usual  You may need to use nonfat cooking spray on pans to prevent food from sticking  You also may need to change the amount of other ingredients, such as water, in the recipe  Try the following:  · Use low-fat or light margarine instead of regular margarine or shortening  · Use lean ground turkey breast or chicken, or lean ground beef (less than 5% fat) instead of hamburger  · Add 1 teaspoon of canola oil to 8 ounces of skim milk instead of using cream or half and half  · Use grated zucchini, carrots, or apples in breads instead of coconut  · Use blenderized, low-fat cottage cheese, plain tofu, or low-fat ricotta cheese instead of cream cheese  · Use 1 egg white and 1 teaspoon of canola oil, or use ¼ cup (2 ounces) of fat-free egg substitute instead of a whole egg       · Replace half of the oil that is called for in a recipe with applesauce when you bake  Use 3 tablespoons of cocoa powder and 1 tablespoon of canola oil instead of a square of baking chocolate  How to increase fiber:  Eat enough high-fiber foods to get 20 to 30 grams of fiber every day  Slowly increase your fiber intake to avoid stomach cramps, gas, and other problems  · Eat 3 ounces of whole-grain foods each day  An ounce is about 1 slice of bread  Eat whole-grain breads, such as whole-wheat bread  Whole wheat, whole-wheat flour, or other whole grains should be listed as the first ingredient on the food label  Replace white flour with whole-grain flour or use half of each in recipes  Whole-grain flour is heavier than white flour, so you may have to add more yeast or baking powder  · Eat a high-fiber cereal for breakfast   Oatmeal is a good source of soluble fiber  Look for cereals that have bran or fiber in the name  Choose whole-grain products, such as brown rice, barley, and whole-wheat pasta  · Eat more beans, peas, and lentils  For example, add beans to soups or salads  Eat at least 5 cups of fruits and vegetables each day  Eat fruits and vegetables with the peel because the peel is high in fiber  © 2017 [employer] Information is for End User's use only and may not be sold, redistributed or otherwise used for commercial purposes  All illustrations and images included in CareNotes® are the copyrighted property of [employer]  or [employer]  The above information is an  only  It is not intended as medical advice for individual conditions or treatments  Talk to your doctor, nurse or pharmacist before following any medical regimen to see if it is safe and effective for you  Heart Healthy Diet   AMBULATORY CARE:   A heart healthy diet  is an eating plan low in total fat, unhealthy fats, and sodium (salt)  A heart healthy diet helps decrease your risk for heart disease and stroke   Limit the amount of fat you eat to 25% to 35% of your total daily calories  Limit sodium to less than 2,300 mg each day  Healthy fats:  Healthy fats can help improve cholesterol levels  The risk for heart disease is decreased when cholesterol levels are normal  Choose healthy fats, such as the following:  · Unsaturated fat  is found in foods such as soybean, canola, olive, corn, and safflower oils  It is also found in soft tub margarine that is made with liquid vegetable oil  · Omega-3 fat  is found in certain fish, such as salmon, tuna, and trout, and in walnuts and flaxseed  Unhealthy fats:  Unhealthy fats can cause unhealthy cholesterol levels in your blood and increase your risk of heart disease  Limit unhealthy fats, such as the following:  · Cholesterol  is found in animal foods, such as eggs and lobster, and in dairy products made from whole milk  Limit cholesterol to less than 300 milligrams (mg) each day  You may need to limit cholesterol to 200 mg each day if you have heart disease  · Saturated fat  is found in meats, such as torres and hamburger  It is also found in chicken or turkey skin, whole milk, and butter  Limit saturated fat to less than 7% of your total daily calories  Limit saturated fat to less than 6% if you have heart disease or are at increased risk for it  · Trans fat  is found in packaged foods, such as potato chips and cookies  It is also in hard margarine, some fried foods, and shortening  Avoid trans fats as much as possible    Heart healthy foods and drinks to include:  Ask your dietitian or healthcare provider how many servings to have from each of the following food groups:  · Grains:      ¨ Whole-wheat breads, cereals, and pastas, and brown rice    ¨ Low-fat, low-sodium crackers and chips    · Vegetables:      ¨ Broccoli, green beans, green peas, and spinach    ¨ Collards, kale, and lima beans    ¨ Carrots, sweet potatoes, tomatoes, and peppers    ¨ Canned vegetables with no salt added    · Fruits:      ¨ Bananas, peaches, pears, and pineapple    ¨ Grapes, raisins, and dates    ¨ Oranges, tangerines, grapefruit, orange juice, and grapefruit juice    ¨ Apricots, mangoes, melons, and papaya    ¨ Raspberries and strawberries    ¨ Canned fruit with no added sugar    · Low-fat dairy products:      ¨ Nonfat (skim) milk, 1% milk, and low-fat almond, cashew, or soy milks fortified with calcium    ¨ Low-fat cheese, regular or frozen yogurt, and cottage cheese    · Meats and proteins , such as lean cuts of beef and pork (loin, leg, round), skinless chicken and turkey, legumes, soy products, egg whites, and nuts  Foods and drinks to limit or avoid:  Ask your dietitian or healthcare provider about these and other foods that are high in unhealthy fat, sodium, and sugar:  · Snack or packaged foods , such as frozen dinners, cookies, macaroni and cheese, and cereals with more than 300 mg of sodium per serving    · Canned or dry mixes  for cakes, soups, sauces, or gravies    · Vegetables with added sodium , such as instant potatoes, vegetables with added sauces, or regular canned vegetables    · Other foods high in sodium , such as ketchup, barbecue sauce, salad dressing, pickles, olives, soy sauce, and miso    · High-fat dairy foods  such as whole or 2% milk, cream cheese, or sour cream, and cheeses     · High-fat protein foods  such as high-fat cuts of beef (T-bone steaks, ribs), chicken or turkey with skin, and organ meats, such as liver    · Cured or smoked meats , such as hot dogs, torres, and sausage    · Unhealthy fats and oils , such as butter, stick margarine, shortening, and cooking oils such as coconut or palm oil    · Food and drinks high in sugar , such as soft drinks (soda), sports drinks, sweetened tea, candy, cake, cookies, pies, and doughnuts  Other diet guidelines to follow:   · Eat more foods containing omega-3 fats  Eat fish high in omega-3 fats at least 2 times a week  · Limit alcohol    Too much alcohol can damage your heart and raise your blood pressure  Women should limit alcohol to 1 drink a day  Men should limit alcohol to 2 drinks a day  A drink of alcohol is 12 ounces of beer, 5 ounces of wine, or 1½ ounces of liquor  · Choose low-sodium foods  High-sodium foods can lead to high blood pressure  Add little or no salt to food you prepare  Use herbs and spices in place of salt  · Eat more fiber  to help lower cholesterol levels  Eat at least 5 servings of fruits and vegetables each day  Eat 3 ounces of whole-grain foods each day  Legumes (beans) are also a good source of fiber  Lifestyle guidelines:   · Do not smoke  Nicotine and other chemicals in cigarettes and cigars can cause lung and heart damage  Ask your healthcare provider for information if you currently smoke and need help to quit  E-cigarettes or smokeless tobacco still contain nicotine  Talk to your healthcare provider before you use these products  · Exercise regularly  to help you maintain a healthy weight and improve your blood pressure and cholesterol levels  Ask your healthcare provider about the best exercise plan for you  Do not start an exercise program without asking your healthcare provider  Follow up with your healthcare provider as directed:  Write down your questions so you remember to ask them during your visits  © 2017 [facility] [facility] Information is for End User's use only and may not be sold, redistributed or otherwise used for commercial purposes  All illustrations and images included in CareNotes® are the copyrighted property of [employer] , [employer]  or [employer]  The above information is an  only  It is not intended as medical advice for individual conditions or treatments  Talk to your doctor, nurse or pharmacist before following any medical regimen to see if it is safe and effective for you  Calorie Counting Diet   WHAT YOU NEED TO KNOW:   What is a calorie counting diet?   It is a meal plan based on counting calories each day to reach a healthy body weight  You will need to eat fewer calories if you are trying to lose weight  Weight loss may decrease your risk for certain health problems or improve your health if you have health problems  Some of these health problems include heart disease, high blood pressure, and diabetes  What foods should I avoid? Your dietitian will tell you if you need to avoid certain foods based on your body weight and health condition  You may need to avoid high-fat foods if you are at risk for or have heart disease  You may need to eat fewer foods from the breads and starches food group if you have diabetes  How many calories are in foods? The following is a list of foods and drinks with the approximate number of calories in each  Check the food label to find the exact number of calories  A dietitian can tell you how many calories you should have from each food group each day    · Carbohydrate:      ¨ ½ of a 3-inch bagel, 1 slice of bread, or ½ of a hamburger bun or hot dog bun (80)    ¨ 1 (8-inch) flour tortilla or ½ cup of cooked rice (100)    ¨ 1 (6-inch) corn tortilla (80)    ¨ 1 (6-inch) pancake or 1 cup of bran flakes cereal (110)    ¨ ½ cup of cooked cereal (80)    ¨ ½ cup of cooked pasta (85)    ¨ 1 ounce of pretzels (100)    ¨ 3 cups of air-popped popcorn without butter or oil (80)    · Dairy:      ¨ 1 cup of skim or 1% milk (90)    ¨ 1 cup of 2% milk (120)    ¨ 1 cup of whole milk (160)    ¨ 1 cup of 2% chocolate milk (220)    ¨ 1 ounce of low-fat cheese with 3 grams of fat per ounce (70)    ¨ 1 ounce of cheddar cheese (114)    ¨ ½ cup of 1% fat cottage cheese (80)    ¨ 1 cup of plain or sugar-free, fat-free yogurt (90)    · Protein foods:      ¨ 3 ounces of fish (not breaded or fried) (95)    ¨ 3 ounces of breaded, fried fish (195)    ¨ ¾ cup of tuna canned in water (105)    ¨ 3 ounces of chicken breast without skin (105)    ¨ 1 fried chicken breast with skin (350)    ¨ ¼ cup of fat free egg substitute (40)    ¨ 1 large egg (75)    ¨ 3 ounces of lean beef or pork (165)    ¨ 3 ounces of fried pork chop or ham (185)    ¨ ½ cup of cooked dried beans, such as kidney, turner, lentils, or navy (115)    ¨ 3 ounces of bologna or lunch meat (225)    ¨ 2 links of breakfast sausage (140)    · Vegetables:      ¨ ½ cup of sliced mushrooms (10)    ¨ 1 cup of salad greens, such as lettuce, spinach, or brice (15)    ¨ ½ cup of steamed asparagus (20)    ¨ ½ cup of cooked summer squash, zucchini squash, or green or wax beans (25)    ¨ 1 cup of broccoli or cauliflower florets, or 1 medium tomato (25)    ¨ 1 large raw carrot or ½ cup of cooked carrots (40)    ¨ ? of a medium cucumber or 1 stalk of celery (5)    ¨ 1 small baked potato (160)    ¨ 1 cup of breaded, fried vegetables (230)    · Fruit:      ¨ 1 (6-inch) banana (55)     ¨ ½ of a 4-inch grapefruit (55)    ¨ 15 grapes (60)    ¨ 1 medium orange or apple (70)    ¨ 1 large peach (65)    ¨ 1 cup of fresh pineapple chunks (75)    ¨ 1 cup of melon cubes (50)    ¨ 1¼ cups of whole strawberries (45)    ¨ ½ cup of fruit canned in juice (55)    ¨ ½ cup of fruit canned in heavy syrup (110)    ¨ ?  cup of raisins (130)    ¨ ½ cup of unsweetened fruit juice (60)    ¨ ½ cup of grape, cranberry, or prune juice (90)    · Fat:      ¨ 10 peanuts or 2 teaspoons of peanut butter (55)    ¨ 2 tablespoons of avocado or 1 tablespoon of regular salad dressing (45)    ¨ 2 slices of torres (90)    ¨ 1 teaspoon of oil, such as safflower, canola, corn, or olive oil (45)    ¨ 2 teaspoons of low-fat margarine, or 1 tablespoon of low-fat mayonnaise (50)    ¨ 1 teaspoon of regular margarine (40)    ¨ 1 tablespoon of regular mayonnaise (135)    ¨ 1 tablespoon of cream cheese or 2 tablespoons of low-fat cream cheese (45)    ¨ 2 tablespoons of vegetable shortening (215)    · Dessert and sweets:      ¨ 8 animal crackers or 5 vanilla wafers (80)    ¨ 1 frozen fruit juice bar (80)    ¨ ½ cup of ice milk or low-fat frozen yogurt (90)    ¨ ½ cup of sherbet or sorbet (125)    ¨ ½ cup of sugar-free pudding or custard (60)    ¨ ½ cup of ice cream (140)    ¨ ½ cup of pudding or custard (175)    ¨ 1 (2-inch) square chocolate brownie (185)    · Combination foods:      ¨ Bean burrito made with an 8-inch tortilla, without cheese (275)    ¨ Chicken breast sandwich with lettuce and tomato (325)    ¨ 1 cup of chicken noodle soup (60)    ¨ 1 beef taco (175)    ¨ Regular hamburger with lettuce and tomato (310)    ¨ Regular cheeseburger with lettuce and tomato (410)     ¨ ¼ of a 12-inch cheese pizza (280)    ¨ Fried fish sandwich with lettuce and tomato (425)    ¨ Hot dog and bun (275)    ¨ 1½ cups of macaroni and cheese (310)    ¨ Taco salad with a fried tortilla shell (870)    · Low-calorie foods:      ¨ 1 tablespoon of ketchup or 1 tablespoon of fat free sour cream (15)    ¨ 1 teaspoon of mustard (5)    ¨ ¼ cup of salsa (20)    ¨ 1 large dill pickle (15)    ¨ 1 tablespoon of fat free salad dressing (10)    ¨ 2 teaspoons of low-sugar, light jam or jelly, or 1 tablespoon of sugar-free syrup (15)    ¨ 1 sugar-free popsicle (15)    ¨ 1 cup of club soda, seltzer water, or diet soda (0)  CARE AGREEMENT:   You have the right to help plan your care  Discuss treatment options with your caregivers to decide what care you want to receive  You always have the right to refuse treatment  The above information is an  only  It is not intended as medical advice for individual conditions or treatments  Talk to your doctor, nurse or pharmacist before following any medical regimen to see if it is safe and effective for you  © 2017 [address] [employer] Information is for End User's use only and may not be sold, redistributed or otherwise used for commercial purposes   All illustrations and images included in CareNotes® are the copyrighted property of [employer]  or [employer]

## 2018-12-11 NOTE — ASSESSMENT & PLAN NOTE
Lab Results   Component Value Date    HGBA1C 5 9 11/12/2018       No results for input(s): POCGLU in the last 72 hours  Blood Sugar Average: Last 72 hrs:   patient's diabetes is showing excellent control with present medication  We did reinforce with the patient despite the fact that his sugars under control he still needs to watch his diet closely and limit his intake of concentrated sweets and simple carbohydrates  Again with the patient being a diabetic we discuss that he should most likely avoid getting any contrast dye x-ray studies that would include the use of dye in order to preserve his kidney function and also he would need to stop his metformin before such procedures    Patient understands and he was told to call if there is any questions or concerns

## 2018-12-11 NOTE — TELEPHONE ENCOUNTER
Notified pt of abnormal Creatinine and to discuss with PCP at today's visit per Dr Kee Guevara  Patient would like to know if he will be staying overnight after his procedure on 12/20  Will direct to Dr Kee Guevara

## 2018-12-11 NOTE — PROGRESS NOTES
Assessment/Plan:    Acute kidney injury [facility])    Patient recently had CT scan of the abdomen for evaluation assessment for his bladder as and prostate  Labs that were performed for pre-admission testing shows a decline in his renal function  He also states that no one told him to stop his metformin prior to the procedure that was performed, patient receiving contrast dye  We do have concerns about the patient's renal function and his acute decline  He did have an acute insult over the last year with the patient having an acute gastroenteritis, dehydration  We will be checking his renal function later in the week nonfasting and with the patient hydrating himself well prior to this  We will have consult made to be seen by Nephrology if he has no improvement in his kidney function  Benign essential hypertension  Blood pressure that was taken on the patient approximately 3 weeks ago was considerably elevated  Patient's blood pressure is improved today  Depending on his renal function we may consider alternate treatment for his blood pressure  If his kidney function continues to deteriorate would consider stopping his metformin    DMII (diabetes mellitus, type 2) ([facility] )  Lab Results   Component Value Date    HGBA1C 5 9 11/12/2018       No results for input(s): POCGLU in the last 72 hours  Blood Sugar Average: Last 72 hrs:   patient's diabetes is showing excellent control with present medication  We did reinforce with the patient despite the fact that his sugars under control he still needs to watch his diet closely and limit his intake of concentrated sweets and simple carbohydrates  Again with the patient being a diabetic we discuss that he should most likely avoid getting any contrast dye x-ray studies that would include the use of dye in order to preserve his kidney function and also he would need to stop his metformin before such procedures    Patient understands and he was told to call if there is any questions or concerns    Arteriosclerotic cardiovascular disease  Patient has not seen his cardiologist in extended period of time  We are trying to arrange for the patient to be seen by another cardiologist to take over his care  He denies any chest pain or increasing shortness of breath or any other cardiac symptoms at this time    Bladder tumor  Patient is following up with Urology and is to undergo a procedure in the near future, a concern is possible bladder tumor  Again consideration must be made of recent changes in his renal function with this surgical procedure and patient must be well hydrated before and after the procedure and metformin should be stopped prior to the procedure at least 24 hr and only restarted 24 hr after procedures performed       Diagnoses and all orders for this visit:    Renal insufficiency  -     Basic metabolic panel; Future    Type 2 diabetes mellitus with other circulatory complication, without long-term current use of insulin (HCC)    Type 2 diabetes mellitus with both eyes affected by mild nonproliferative retinopathy without macular edema, without long-term current use of insulin (HCC)    Arteriosclerotic cardiovascular disease    Benign essential hypertension    Acute kidney injury ([facility] )    Bladder tumor          Subjective:      Patient ID: Trinity López is a 66 y o  male  Patient is a 55-year-old male with a history of multiple medical problems as outlined previously  Patient is here today for routine follow-up  Patient relates that he was recently seen by his urologist and he is to undergo procedure in the near future for his BPH and up polyp found in his bladder  Patient did have pre-admission testing performed recently and they do show a deterioration in his renal function acutely    This may be secondary to a recent insult to his kidneys with contrast dye and also with the patient taking is metformin prior to the procedure        The following portions of the patient's history were reviewed and updated as appropriate:   He  has a past medical history of Arthritis; Wright esophagus; BPH with obstruction/lower urinary tract symptoms; CAD (coronary artery disease); Cataract, acquired; Diabetes mellitus ([facility] ); Diabetic neuropathy ([facility] ); Enlarged prostate with lower urinary tract symptoms (LUTS); Erectile dysfunction; GERD (gastroesophageal reflux disease); Hemoptysis; Hypercholesterolemia; Hypertension; Macular degeneration; Myocardial infarction ([facility] ) (1998); OAB (overactive bladder); Testicular hypofunction; Testicular hypogonadism; Umbilical hernia; Urge incontinence of urine; and Wears glasses  He   Patient Active Problem List    Diagnosis Date Noted    Type 2 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 ([place] [address] ) 12/05/2018    Bladder tumor 11/01/2018    Benign prostatic hyperplasia with lower urinary tract symptoms 10/10/2018    Overactive bladder 10/10/2018    Acute kidney injury ([place] [address] ) 05/30/2018    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    Backache 10/21/2013    Arteriosclerotic cardiovascular disease 10/11/2012    Benign essential hypertension 10/11/2012    DMII (diabetes mellitus, type 2) ([place] [address] ) 10/11/2012    Hyperlipidemia 10/11/2012     He  has a past surgical history that includes pr colonoscopy flx dx w/collj spec when pfrmd (N/A, 4/25/2016); Cystoscopy (2013); Coronary artery bypass graft (07/16/2014); Colonoscopy; Inguinal hernia repair (Bilateral, 2015); Umbilical hernia repair (2012); Esophagogastroduodenoscopy; Tonsillectomy; and Photodynamic Therapy  His family history includes Cancer in his mother; Diabetes in his father; Heart disease in his father; Other in his mother  He  reports that he quit smoking about 46 years ago  His smoking use included Cigarettes  He has a 13 00 pack-year smoking history  He has never used smokeless tobacco  He reports that he drinks alcohol   He reports that he does not use drugs  Current Outpatient Prescriptions   Medication Sig Dispense Refill    aspirin 81 MG tablet Take 81 mg by mouth daily   finasteride (PROSCAR) 5 mg tablet TAKE ONE TABLET BY MOUTH EVERY DAY (Patient taking differently: TAKE ONE TABLET BY MOUTH EVERY DAY AT HS) 90 tablet 3    irbesartan (AVAPRO) 300 mg tablet Take 300 mg by mouth every morning        metFORMIN (GLUCOPHAGE-XR) 500 mg 24 hr tablet Take 1 tablet (500 mg total) by mouth 2 (two) times a day with meals 180 tablet 3    metoprolol tartrate (LOPRESSOR) 50 mg tablet Take 50 mg by mouth 2 (two) times a day   Multiple Vitamins-Minerals (CENTRUM SILVER 50+MEN PO) Take 1 tablet by mouth daily        Multiple Vitamins-Minerals (OCUVITE-LUTEIN PO) Take by mouth      Omega-3 Fatty Acids (FISH OIL) 1200 MG CAPS Take 1 capsule by mouth 2 (two) times a day        omeprazole (PriLOSEC) 40 MG capsule Take 40 mg by mouth daily at bedtime        simvastatin (ZOCOR) 20 mg tablet Take 20 mg by mouth daily at bedtime   terazosin (HYTRIN) 10 MG capsule TAKE ONE CAPSULE BY MOUTH EVERY DAY (Patient taking differently: TAKE ONE CAPSULE BY MOUTH EVERY DAY AT HS) 90 capsule 3    VESICARE 10 MG tablet TAKE ONE TABLET BY MOUTH EVERY DAY 90 tablet 2     No current facility-administered medications for this visit  Current Outpatient Prescriptions on File Prior to Visit   Medication Sig    aspirin 81 MG tablet Take 81 mg by mouth daily   finasteride (PROSCAR) 5 mg tablet TAKE ONE TABLET BY MOUTH EVERY DAY (Patient taking differently: TAKE ONE TABLET BY MOUTH EVERY DAY AT HS)    irbesartan (AVAPRO) 300 mg tablet Take 300 mg by mouth every morning      metFORMIN (GLUCOPHAGE-XR) 500 mg 24 hr tablet Take 1 tablet (500 mg total) by mouth 2 (two) times a day with meals    metoprolol tartrate (LOPRESSOR) 50 mg tablet Take 50 mg by mouth 2 (two) times a day      Multiple Vitamins-Minerals (CENTRUM SILVER 50+MEN PO) Take 1 tablet by mouth daily      Multiple Vitamins-Minerals (OCUVITE-LUTEIN PO) Take by mouth    Omega-3 Fatty Acids (FISH OIL) 1200 MG CAPS Take 1 capsule by mouth 2 (two) times a day      omeprazole (PriLOSEC) 40 MG capsule Take 40 mg by mouth daily at bedtime      simvastatin (ZOCOR) 20 mg tablet Take 20 mg by mouth daily at bedtime   terazosin (HYTRIN) 10 MG capsule TAKE ONE CAPSULE BY MOUTH EVERY DAY (Patient taking differently: TAKE ONE CAPSULE BY MOUTH EVERY DAY AT HS)    VESICARE 10 MG tablet TAKE ONE TABLET BY MOUTH EVERY DAY     No current facility-administered medications on file prior to visit  He is allergic to morphine and related       Review of Systems   Constitutional: Negative  HENT: Negative  Eyes: Negative  Respiratory: Negative  Cardiovascular: Positive for leg swelling (Patient states that he occasionally has some swelling to the feet and ankles by the end of the day but no increase)  Negative for chest pain and palpitations  Gastrointestinal: Negative  Endocrine: Negative  Genitourinary: Positive for difficulty urinating  Negative for decreased urine volume, discharge, dysuria, enuresis, flank pain, frequency, genital sores, hematuria, penile pain, penile swelling, scrotal swelling, testicular pain and urgency  Patient has a history of BPH and problems with urinary flow  He states with the medication he is taking at this point time they have been helpful with less nocturia and less frequency and better flow   Musculoskeletal: Positive for arthralgias ( the some minor diffuse arthritic aches and pains but nothing new)  Negative for back pain, gait problem, joint swelling, myalgias, neck pain and neck stiffness  Skin: Negative  Allergic/Immunologic: Negative  Neurological: Negative  Hematological: Negative  Psychiatric/Behavioral: Negative            Objective:      /68   Pulse 76   Temp 97 7 °F (36 5 °C)   Ht 5' 8" (1 727 m)   Wt 91 2 kg (201 lb)   SpO2 98%   BMI 30 56 kg/m²          Physical Exam   Constitutional: He is oriented to person, place, and time  He appears well-developed and well-nourished  No distress  Pleasant, articulate 60-year-old male who is awake alert in no acute distress oriented x3   HENT:   Head: Normocephalic and atraumatic  Right Ear: External ear normal    Left Ear: External ear normal    Nose: Nose normal    Mouth/Throat: Oropharynx is clear and moist  No oropharyngeal exudate  Eyes: Pupils are equal, round, and reactive to light  Conjunctivae and EOM are normal  Right eye exhibits no discharge  Left eye exhibits no discharge  No scleral icterus  Neck: Normal range of motion  Neck supple  No JVD present  No tracheal deviation present  No thyromegaly present  Cardiovascular: Normal rate, regular rhythm, normal heart sounds and intact distal pulses  Exam reveals no gallop and no friction rub  No murmur heard  Pulmonary/Chest: Effort normal and breath sounds normal  No stridor  No respiratory distress  He has no wheezes  He has no rales  He exhibits no tenderness  Abdominal: Soft  Bowel sounds are normal  He exhibits no distension and no mass  There is no tenderness  There is no rebound and no guarding  Obese   Musculoskeletal: He exhibits edema (Trace edema bilateral lower extremities without change) and deformity (Mild diffuse arthritic changes to the hands and digits but nothing acute and no inflammation on exam)  He exhibits no tenderness  Lymphadenopathy:     He has no cervical adenopathy  Neurological: He is alert and oriented to person, place, and time  He displays abnormal reflex (Patient does have an absence of patella and Achilles tendon reflexes bilaterally)  No cranial nerve deficit  He exhibits normal muscle tone  Coordination normal    Skin: Skin is warm and dry  No rash noted  He is not diaphoretic  No erythema  No pallor  Psychiatric: He has a normal mood and affect   His behavior is normal  Judgment and thought content normal    Nursing note and vitals reviewed  BMI Counseling: Body mass index is 30 56 kg/m²  Discussed the patient's BMI with him  The BMI is above average  BMI counseling and education was provided to the patient  Nutrition recommendations include reducing portion sizes, decreasing overall calorie intake, 3-5 servings of fruits/vegetables daily and consuming healthier snacks  Exercise recommendations include exercising 3-5 times per week

## 2018-12-11 NOTE — ASSESSMENT & PLAN NOTE
Blood pressure that was taken on the patient approximately 3 weeks ago was considerably elevated  Patient's blood pressure is improved today  Depending on his renal function we may consider alternate treatment for his blood pressure    If his kidney function continues to deteriorate would consider stopping his metformin

## 2018-12-14 ENCOUNTER — APPOINTMENT (OUTPATIENT)
Dept: LAB | Facility: CLINIC | Age: 78
End: 2018-12-14
Payer: MEDICARE

## 2018-12-14 ENCOUNTER — TRANSCRIBE ORDERS (OUTPATIENT)
Dept: LAB | Facility: CLINIC | Age: 78
End: 2018-12-14

## 2018-12-14 DIAGNOSIS — N28.9 RENAL INSUFFICIENCY: ICD-10-CM

## 2018-12-14 LAB
ANION GAP SERPL CALCULATED.3IONS-SCNC: 7 MMOL/L (ref 4–13)
BUN SERPL-MCNC: 22 MG/DL (ref 5–25)
CALCIUM SERPL-MCNC: 8.8 MG/DL (ref 8.3–10.1)
CHLORIDE SERPL-SCNC: 106 MMOL/L (ref 100–108)
CO2 SERPL-SCNC: 27 MMOL/L (ref 21–32)
CREAT SERPL-MCNC: 1.34 MG/DL (ref 0.6–1.3)
GFR SERPL CREATININE-BSD FRML MDRD: 50 ML/MIN/1.73SQ M
GLUCOSE SERPL-MCNC: 180 MG/DL (ref 65–140)
POTASSIUM SERPL-SCNC: 4 MMOL/L (ref 3.5–5.3)
SODIUM SERPL-SCNC: 140 MMOL/L (ref 136–145)

## 2018-12-14 PROCEDURE — 36415 COLL VENOUS BLD VENIPUNCTURE: CPT

## 2018-12-14 PROCEDURE — 80048 BASIC METABOLIC PNL TOTAL CA: CPT

## 2018-12-17 DIAGNOSIS — N28.9 RENAL INSUFFICIENCY: Primary | ICD-10-CM

## 2018-12-20 ENCOUNTER — TELEPHONE (OUTPATIENT)
Dept: OTHER | Facility: HOSPITAL | Age: 78
End: 2018-12-20

## 2018-12-20 ENCOUNTER — HOSPITAL ENCOUNTER (OUTPATIENT)
Facility: HOSPITAL | Age: 78
Setting detail: OUTPATIENT SURGERY
Discharge: HOME | End: 2018-12-20
Attending: UROLOGY | Admitting: UROLOGY
Payer: MEDICARE

## 2018-12-20 ENCOUNTER — HOSPITAL ENCOUNTER (OUTPATIENT)
Dept: RADIOLOGY | Facility: HOSPITAL | Age: 78
Setting detail: OUTPATIENT SURGERY
Discharge: HOME | End: 2018-12-20
Payer: MEDICARE

## 2018-12-20 ENCOUNTER — TELEPHONE (OUTPATIENT)
Dept: UROLOGY | Facility: MEDICAL CENTER | Age: 78
End: 2018-12-20

## 2018-12-20 ENCOUNTER — ANESTHESIA (OUTPATIENT)
Dept: PERIOP | Facility: HOSPITAL | Age: 78
End: 2018-12-20
Payer: MEDICARE

## 2018-12-20 VITALS
BODY MASS INDEX: 30.55 KG/M2 | RESPIRATION RATE: 18 BRPM | OXYGEN SATURATION: 96 % | WEIGHT: 201.6 LBS | DIASTOLIC BLOOD PRESSURE: 79 MMHG | HEART RATE: 73 BPM | HEIGHT: 68 IN | SYSTOLIC BLOOD PRESSURE: 183 MMHG | TEMPERATURE: 98.7 F

## 2018-12-20 DIAGNOSIS — N40.1 BENIGN PROSTATIC HYPERPLASIA WITH LOWER URINARY TRACT SYMPTOMS, SYMPTOM DETAILS UNSPECIFIED: ICD-10-CM

## 2018-12-20 DIAGNOSIS — N40.1 BENIGN PROSTATIC HYPERPLASIA WITH WEAK URINARY STREAM: Primary | ICD-10-CM

## 2018-12-20 DIAGNOSIS — D49.4 BLADDER TUMOR: ICD-10-CM

## 2018-12-20 DIAGNOSIS — R39.12 BENIGN PROSTATIC HYPERPLASIA WITH WEAK URINARY STREAM: Primary | ICD-10-CM

## 2018-12-20 LAB
ABO GROUP BLD: NORMAL
ALBUMIN SERPL BCP-MCNC: 3.5 G/DL (ref 3.5–5)
ALP SERPL-CCNC: 59 U/L (ref 46–116)
ALT SERPL W P-5'-P-CCNC: 25 U/L (ref 12–78)
ANION GAP SERPL CALCULATED.3IONS-SCNC: 12 MMOL/L (ref 4–13)
AST SERPL W P-5'-P-CCNC: 16 U/L (ref 5–45)
BILIRUB SERPL-MCNC: 0.53 MG/DL (ref 0.2–1)
BLD GP AB SCN SERPL QL: NEGATIVE
BUN SERPL-MCNC: 19 MG/DL (ref 5–25)
CALCIUM SERPL-MCNC: 9 MG/DL (ref 8.3–10.1)
CHLORIDE SERPL-SCNC: 105 MMOL/L (ref 100–108)
CO2 SERPL-SCNC: 25 MMOL/L (ref 21–32)
CREAT SERPL-MCNC: 1.4 MG/DL (ref 0.6–1.3)
ERYTHROCYTE [DISTWIDTH] IN BLOOD BY AUTOMATED COUNT: 13.4 % (ref 11.6–15.1)
GFR SERPL CREATININE-BSD FRML MDRD: 48 ML/MIN/1.73SQ M
GLUCOSE P FAST SERPL-MCNC: 159 MG/DL (ref 65–99)
GLUCOSE SERPL-MCNC: 147 MG/DL (ref 65–140)
GLUCOSE SERPL-MCNC: 159 MG/DL (ref 65–140)
GLUCOSE SERPL-MCNC: 162 MG/DL (ref 65–140)
HCT VFR BLD AUTO: 41.6 % (ref 36.5–49.3)
HGB BLD-MCNC: 14 G/DL (ref 12–17)
MCH RBC QN AUTO: 31.6 PG (ref 26.8–34.3)
MCHC RBC AUTO-ENTMCNC: 33.7 G/DL (ref 31.4–37.4)
MCV RBC AUTO: 94 FL (ref 82–98)
PLATELET # BLD AUTO: 157 THOUSANDS/UL (ref 149–390)
PMV BLD AUTO: 11.7 FL (ref 8.9–12.7)
POTASSIUM SERPL-SCNC: 3.7 MMOL/L (ref 3.5–5.3)
PROT SERPL-MCNC: 7.1 G/DL (ref 6.4–8.2)
RBC # BLD AUTO: 4.43 MILLION/UL (ref 3.88–5.62)
RH BLD: POSITIVE
SODIUM SERPL-SCNC: 142 MMOL/L (ref 136–145)
SPECIMEN EXPIRATION DATE: NORMAL
WBC # BLD AUTO: 9 THOUSAND/UL (ref 4.31–10.16)

## 2018-12-20 PROCEDURE — 74420 UROGRAPHY RTRGR +-KUB: CPT

## 2018-12-20 PROCEDURE — 52204 CYSTOSCOPY W/BIOPSY(S): CPT | Performed by: UROLOGY

## 2018-12-20 PROCEDURE — 86900 BLOOD TYPING SEROLOGIC ABO: CPT | Performed by: UROLOGY

## 2018-12-20 PROCEDURE — 88305 TISSUE EXAM BY PATHOLOGIST: CPT | Performed by: PATHOLOGY

## 2018-12-20 PROCEDURE — 86901 BLOOD TYPING SEROLOGIC RH(D): CPT | Performed by: UROLOGY

## 2018-12-20 PROCEDURE — 52648 LASER SURGERY OF PROSTATE: CPT | Performed by: UROLOGY

## 2018-12-20 PROCEDURE — 86850 RBC ANTIBODY SCREEN: CPT | Performed by: UROLOGY

## 2018-12-20 PROCEDURE — 82948 REAGENT STRIP/BLOOD GLUCOSE: CPT

## 2018-12-20 PROCEDURE — 85027 COMPLETE CBC AUTOMATED: CPT | Performed by: UROLOGY

## 2018-12-20 PROCEDURE — 80053 COMPREHEN METABOLIC PANEL: CPT | Performed by: UROLOGY

## 2018-12-20 RX ORDER — FENTANYL CITRATE 50 UG/ML
INJECTION, SOLUTION INTRAMUSCULAR; INTRAVENOUS AS NEEDED
Status: DISCONTINUED | OUTPATIENT
Start: 2018-12-20 | End: 2018-12-20 | Stop reason: SURG

## 2018-12-20 RX ORDER — ONDANSETRON 2 MG/ML
INJECTION INTRAMUSCULAR; INTRAVENOUS AS NEEDED
Status: DISCONTINUED | OUTPATIENT
Start: 2018-12-20 | End: 2018-12-20 | Stop reason: SURG

## 2018-12-20 RX ORDER — ONDANSETRON 2 MG/ML
4 INJECTION INTRAMUSCULAR; INTRAVENOUS EVERY 6 HOURS PRN
Status: DISCONTINUED | OUTPATIENT
Start: 2018-12-20 | End: 2018-12-20 | Stop reason: HOSPADM

## 2018-12-20 RX ORDER — CEFAZOLIN SODIUM 2 G/50ML
2000 SOLUTION INTRAVENOUS ONCE
Status: DISCONTINUED | OUTPATIENT
Start: 2018-12-20 | End: 2018-12-20 | Stop reason: SDUPTHER

## 2018-12-20 RX ORDER — SODIUM CHLORIDE 9 MG/ML
125 INJECTION, SOLUTION INTRAVENOUS CONTINUOUS
Status: DISCONTINUED | OUTPATIENT
Start: 2018-12-20 | End: 2018-12-20 | Stop reason: HOSPADM

## 2018-12-20 RX ORDER — HYDROMORPHONE HCL/PF 1 MG/ML
0.2 SYRINGE (ML) INJECTION
Status: DISCONTINUED | OUTPATIENT
Start: 2018-12-20 | End: 2018-12-20 | Stop reason: HOSPADM

## 2018-12-20 RX ORDER — PROPOFOL 10 MG/ML
INJECTION, EMULSION INTRAVENOUS AS NEEDED
Status: DISCONTINUED | OUTPATIENT
Start: 2018-12-20 | End: 2018-12-20 | Stop reason: SURG

## 2018-12-20 RX ORDER — MIDAZOLAM HYDROCHLORIDE 1 MG/ML
INJECTION INTRAMUSCULAR; INTRAVENOUS AS NEEDED
Status: DISCONTINUED | OUTPATIENT
Start: 2018-12-20 | End: 2018-12-20 | Stop reason: SURG

## 2018-12-20 RX ORDER — TRAMADOL HYDROCHLORIDE 50 MG/1
50 TABLET ORAL EVERY 6 HOURS PRN
Qty: 10 TABLET | Refills: 0 | Status: SHIPPED | OUTPATIENT
Start: 2018-12-20 | End: 2018-12-30

## 2018-12-20 RX ORDER — MAGNESIUM HYDROXIDE 1200 MG/15ML
LIQUID ORAL AS NEEDED
Status: DISCONTINUED | OUTPATIENT
Start: 2018-12-20 | End: 2018-12-20 | Stop reason: HOSPADM

## 2018-12-20 RX ORDER — DEXAMETHASONE SODIUM PHOSPHATE 4 MG/ML
INJECTION, SOLUTION INTRA-ARTICULAR; INTRALESIONAL; INTRAMUSCULAR; INTRAVENOUS; SOFT TISSUE AS NEEDED
Status: DISCONTINUED | OUTPATIENT
Start: 2018-12-20 | End: 2018-12-20 | Stop reason: SURG

## 2018-12-20 RX ORDER — TRAMADOL HYDROCHLORIDE 50 MG/1
50 TABLET ORAL EVERY 6 HOURS PRN
Status: DISCONTINUED | OUTPATIENT
Start: 2018-12-20 | End: 2018-12-20 | Stop reason: HOSPADM

## 2018-12-20 RX ORDER — ACETAMINOPHEN 325 MG/1
650 TABLET ORAL EVERY 6 HOURS PRN
Status: DISCONTINUED | OUTPATIENT
Start: 2018-12-20 | End: 2018-12-20 | Stop reason: HOSPADM

## 2018-12-20 RX ORDER — FENTANYL CITRATE/PF 50 MCG/ML
50 SYRINGE (ML) INJECTION
Status: DISCONTINUED | OUTPATIENT
Start: 2018-12-20 | End: 2018-12-20 | Stop reason: HOSPADM

## 2018-12-20 RX ORDER — METOPROLOL TARTRATE 5 MG/5ML
2.5 INJECTION INTRAVENOUS ONCE
Status: COMPLETED | OUTPATIENT
Start: 2018-12-20 | End: 2018-12-20

## 2018-12-20 RX ORDER — ALBUTEROL SULFATE 2.5 MG/3ML
2.5 SOLUTION RESPIRATORY (INHALATION) ONCE AS NEEDED
Status: DISCONTINUED | OUTPATIENT
Start: 2018-12-20 | End: 2018-12-20 | Stop reason: HOSPADM

## 2018-12-20 RX ORDER — CEFAZOLIN SODIUM 2 G/50ML
2000 SOLUTION INTRAVENOUS ONCE
Status: COMPLETED | OUTPATIENT
Start: 2018-12-20 | End: 2018-12-20

## 2018-12-20 RX ORDER — MEPERIDINE HYDROCHLORIDE 50 MG/ML
12.5 INJECTION INTRAMUSCULAR; INTRAVENOUS; SUBCUTANEOUS AS NEEDED
Status: DISCONTINUED | OUTPATIENT
Start: 2018-12-20 | End: 2018-12-20 | Stop reason: HOSPADM

## 2018-12-20 RX ORDER — FUROSEMIDE 10 MG/ML
INJECTION INTRAMUSCULAR; INTRAVENOUS AS NEEDED
Status: DISCONTINUED | OUTPATIENT
Start: 2018-12-20 | End: 2018-12-20 | Stop reason: SURG

## 2018-12-20 RX ADMIN — METOPROLOL TARTRATE 2.5 MG: 1 INJECTION, SOLUTION INTRAVENOUS at 09:34

## 2018-12-20 RX ADMIN — LIDOCAINE HYDROCHLORIDE 100 MG: 20 INJECTION, SOLUTION INTRAVENOUS at 11:33

## 2018-12-20 RX ADMIN — TRAMADOL HYDROCHLORIDE 50 MG: 50 TABLET, COATED ORAL at 15:35

## 2018-12-20 RX ADMIN — CEFAZOLIN SODIUM 2000 MG: 2 SOLUTION INTRAVENOUS at 11:33

## 2018-12-20 RX ADMIN — SODIUM CHLORIDE 125 ML/HR: 0.9 INJECTION, SOLUTION INTRAVENOUS at 09:34

## 2018-12-20 RX ADMIN — ONDANSETRON 4 MG: 2 INJECTION INTRAMUSCULAR; INTRAVENOUS at 11:33

## 2018-12-20 RX ADMIN — MIDAZOLAM 2 MG: 1 INJECTION INTRAMUSCULAR; INTRAVENOUS at 11:29

## 2018-12-20 RX ADMIN — DEXAMETHASONE SODIUM PHOSPHATE 4 MG: 4 INJECTION, SOLUTION INTRAMUSCULAR; INTRAVENOUS at 11:33

## 2018-12-20 RX ADMIN — PROPOFOL 200 MG: 10 INJECTION, EMULSION INTRAVENOUS at 11:33

## 2018-12-20 RX ADMIN — FENTANYL CITRATE 50 MCG: 50 INJECTION, SOLUTION INTRAMUSCULAR; INTRAVENOUS at 12:38

## 2018-12-20 RX ADMIN — FUROSEMIDE 10 MG: 10 INJECTION, SOLUTION INTRAMUSCULAR; INTRAVENOUS at 12:49

## 2018-12-20 RX ADMIN — FENTANYL CITRATE 50 MCG: 50 INJECTION, SOLUTION INTRAMUSCULAR; INTRAVENOUS at 11:38

## 2018-12-20 RX ADMIN — IOHEXOL 12 ML: 240 INJECTION, SOLUTION INTRATHECAL; INTRAVASCULAR; INTRAVENOUS; ORAL at 12:31

## 2018-12-20 NOTE — DISCHARGE INSTR - AVS FIRST PAGE
Tylenol should be taken every 6 hr as your first-line pain medication  It is okay to use ibuprofen for breakthrough pain  Tramadol can be used if the Tylenol and ibuprofen are not sufficient  Call for fever, heavy urinary bleeding or difficulty voiding once the Goins catheter has been removed

## 2018-12-20 NOTE — DISCHARGE SUMMARY
DISCHARGE SUMMARY     Patient Name: Italo Raman    Patient MRN: [id number]    Admitting Provider: Fortunato Nunez MD    Discharging Provider: Fortunato Nunez MD    Primary Care Physician at Discharge: Edwin Pineda [place] [phone]     Admission Date: 12/20/2018     Discharge Date: 12/20/2018    Admission Diagnosis   Benign prostatic hyperplasia with lower urinary tract symptoms, symptom details unspecified [N40 1]  Bladder tumor [D49 4]    Discharge Diagnoses  Active Problems:    Benign prostatic hyperplasia with lower urinary tract symptoms    Bladder tumor      Medications  Current Discharge Medication List      START taking these medications    Details   traMADol (ULTRAM) 50 mg tablet Take 1 tablet (50 mg total) by mouth every 6 (six) hours as needed for moderate pain for up to 10 days  Qty: 10 tablet, Refills: 0    Associated Diagnoses: Benign prostatic hyperplasia with weak urinary stream            Current Discharge Medication List      CONTINUE these medications which have NOT CHANGED    Details   aspirin 81 MG tablet Take 81 mg by mouth daily  finasteride (PROSCAR) 5 mg tablet TAKE ONE TABLET BY MOUTH EVERY DAY  Qty: 90 tablet, Refills: 3    Associated Diagnoses: BPH with obstruction/lower urinary tract symptoms      irbesartan (AVAPRO) 300 mg tablet Take 300 mg by mouth every morning        metFORMIN (GLUCOPHAGE-XR) 500 mg 24 hr tablet Take 1 tablet (500 mg total) by mouth 2 (two) times a day with meals  Qty: 180 tablet, Refills: 3    Associated Diagnoses: Type 2 diabetes mellitus without complication, without long-term current use of insulin (HCC)      metoprolol tartrate (LOPRESSOR) 50 mg tablet Take 50 mg by mouth 2 (two) times a day  !! Multiple Vitamins-Minerals (CENTRUM SILVER 50+MEN PO) Take 1 tablet by mouth daily        !!  Multiple Vitamins-Minerals (OCUVITE-LUTEIN PO) Take 1 tablet by mouth 2 (two) times a day        Omega-3 Fatty Acids (FISH OIL) 1200 MG CAPS Take 1 capsule by mouth 2 (two) times a day        omeprazole (PriLOSEC) 40 MG capsule Take 40 mg by mouth daily at bedtime        simvastatin (ZOCOR) 20 mg tablet Take 20 mg by mouth daily at bedtime  terazosin (HYTRIN) 10 MG capsule TAKE ONE CAPSULE BY MOUTH EVERY DAY  Qty: 90 capsule, Refills: 3    Associated Diagnoses: BPH with obstruction/lower urinary tract symptoms      VESICARE 10 MG tablet TAKE ONE TABLET BY MOUTH EVERY DAY  Qty: 90 tablet, Refills: 2    Associated Diagnoses: Overactive bladder       !! - Potential duplicate medications found  Please discuss with provider  The [place] drug awareness web site was queried and no issues identified  Allergies  Allergies   Allergen Reactions    Morphine And Related Other (See Comments)     While having an MI patient received morphine and had adverse reaction but doesn't know what happened  Outpatient Follow-Up  Candy Angel MD  [facility]  [address] [place], [place] [address] [place] [address]  [phone]    Schedule an appointment as soon as possible for a visit in 1 month(s)  Goins out on Monday  Please keep any pre arranged appointments  Please make an appointment to see Dr Jonh Hdz or Dr Imelda Barthel in 1 months time  ? Discharge Disposition  Home    Operative Procedures Performed  Procedure(s):  CYSTO, PHOTO SELECTIVE VAPORIZATION OF PROSTATE, B/L RETROGRADE PYELOGRAM, TURBT  ? Physical Exam at Discharge  Condition of Patient on Discharge: stable  ?   Candy Angel MD

## 2018-12-20 NOTE — TELEPHONE ENCOUNTER
----- Message from Jaye Menendez MD sent at 12/20/2018  1:08 PM EST -----  Regarding: Voiding trial  Please call the patient this afternoon to arrange a voiding trial for Monday 12/24  post [employer] laser      Thank you

## 2018-12-20 NOTE — INTERVAL H&P NOTE
H&P reviewed  After examining the patient I find no changes in the patients condition since the H&P had been written  Procedure reviewed with patient in the holding unit  He understands irritative symptoms sometimes do not improve after prostate surgery

## 2018-12-20 NOTE — TELEPHONE ENCOUNTER
Left message for patient with our number   Either he or his wife is to call us back to arrange an appointment to have his Goins removed on 12/24

## 2018-12-20 NOTE — DISCHARGE INSTRUCTIONS
Laser Prostatectomy   WHAT YOU NEED TO KNOW:   Laser prostatectomy is a surgery that uses light beams to destroy part of the prostate gland  This can help reduce urinary problems caused by an enlarged prostate  DISCHARGE INSTRUCTIONS:   Medicines:   · Medicines  can help decrease pain or swelling  You may also need medicine to prevent or treat a bacterial infection  · Take your medicine as directed  Contact your healthcare provider if you think your medicine is not helping or if you have side effects  Tell him if you are allergic to any medicine  Keep a list of the medicines, vitamins, and herbs you take  Include the amounts, and when and why you take them  Bring the list or the pill bottles to follow-up visits  Carry your medicine list with you in case of an emergency  Follow up with your healthcare provider or surgeon as directed:  Write down your questions so you remember to ask them during your visits  Bladder care:   · Empty your bladder on a regular basis  Try to urinate every 3 hours while you are awake  Do not let your bladder become too full  Urinate as soon as you feel the need  Do not drink liquids before you go to bed  Urinate right before you go to bed  · Follow instructions for catheterization  You may need to catheterize yourself if you cannot urinate on your own  Ask for more information on self-catheterization  Self-care:   · Rest as needed  Slowly begin doing more each day  Return to your daily activities as directed  · Prevent constipation  Eat foods that are high in fiber, and drink more liquids  High-fiber foods, such as fruits, vegetables, and whole grains, will help soften your bowel movements  Regular exercise and extra liquids also help prevent constipation  · Ask your healthcare provider when it is safe to have sex  Do not get sexually aroused without ejaculating because your urethra may get blocked  · Do not smoke    If you smoke, it is never too late to quit  Smoking increases the time it takes to heal  Ask your healthcare provider for information about how to stop smoking if you need help  Contact your healthcare provider or surgeon if:   · You have a fever  · Your symptoms get worse  · You have blood in your urine  · You have chills, a cough, or feel weak and achy  · You are dizzy, have nausea, or are vomiting  · You have questions or concerns about your condition or care  Seek care immediately or call 911 if:   · You cannot urinate, or if you have a catheter, no urine is filling the bag  · Your catheter comes out of your urethra  · You have lower abdominal or back pain that does not go away  · You have redness, pain, blood, or drainage where the catheter enters your penis  · Your urine is red, cloudy, and foul smelling  © 2017 [employer]  Information is for End User's use only and may not be sold, redistributed or otherwise used for commercial purposes  All illustrations and images included in CareNotes® are the copyrighted property of [employer] M [employer]  or [employer]cl[employer]  The above information is an  only  It is not intended as medical advice for individual conditions or treatments  Talk to your doctor, nurse or pharmacist before following any medical regimen to see if it is safe and effective for you  Goins Catheter Placement and Care   WHAT YOU NEED TO KNOW:   A Goins catheter is a sterile tube that is inserted into your bladder to drain urine  It is also called an indwelling urinary catheter  The tip of the catheter has a small balloon filled with solution that holds the catheter inside your bladder  DISCHARGE INSTRUCTIONS:   Seek care immediately if:   · Your catheter comes out  · You suddenly have material that looks like sand in the tubing or drainage bag  · No urine is draining into the bag and you have checked the system      · You have pain in your hip, back, pelvis, or lower abdomen  · You are confused or cannot think clearly  Contact your healthcare provider if:   · You have a fever  · You have bladder spasms for more than 1 day after the catheter is placed  · You see blood in the tubing or drainage bag  · You have a rash or itching where the catheter tube is secured to your skin  · Urine leaks from or around the catheter, tubing, or drainage bag  · The closed drainage system has accidently come open or apart  · You see a layer of crystals inside the tubing  · You have questions or concerns about your condition or care  Care for your Goins catheter:   · Clean your genital area 2 times every day  Clean your catheter and the area around where it was inserted  Use soap and water  Clean your anal opening and catheter area after every bowel movement  · Secure the catheter tube  so you do not pull or move the catheter  This helps prevent pain and bladder spasms  Healthcare providers will show you how to use medical tape or a strap to secure the catheter tube to your body  · Keep a closed drainage system  Your Goins catheter should always be attached to the drainage bag to form a closed system  Do not disconnect any part of the closed system unless you need to change the bag  Care for your drainage bag:   · Ask if a leg bag is right for you  A leg bag can be worn under your clothes  Ask your healthcare provider for more information about a leg bag  · Keep the drainage bag below the level of your waist   This helps stop urine from moving back up the tubing and into your bladder  Do not loop or kink the tubing  This can cause urine to back up and collect in your bladder  Do not let the drainage bag touch or lie on the floor  · Empty the drainage bag when needed  The weight of a full drainage bag can be painful  Empty the drainage bag every 3 to 6 hours or when it is ? full  · Clean and change the drainage bag as directed  Ask your healthcare provider how often you should change the drainage bag and what cleaning solution to use  Wear disposable gloves when you change the bag  Do not allow the end of the catheter or tubing to touch anything  Clean the ends with an alcohol pad before you reconnect them  What to do if problems develop:   · No urine is draining into the bag:      ¨ Check for kinks in the tubing and straighten them out  ¨ Check the tape or strap used to secure the catheter tube to your skin  Make sure it is not blocking the tube  ¨ Make sure you are not sitting or lying on the tubing  ¨ Make sure the urine bag is hanging below the level of your waist     · Urine leaks from or around the catheter, tubing, or drainage bag:  Check if the closed drainage system has accidently come open or apart  Clean the catheter and tubing ends with a new alcohol pad and reconnect them  Prevent an infection:   · Wash your hands often  Wash before and after you touch your catheter, tubing, or drainage bag  Use soap and water  Wear clean disposable gloves when you care for your catheter or disconnect the drainage bag  Wash your hands before you prepare or eat food  · Drink liquids as directed  Ask your healthcare provider how much liquid to drink each day and which liquids are best for you  Liquids will help flush your kidneys and bladder to help prevent infection  Follow up with your healthcare provider as directed:  Write down your questions so you remember to ask them during your visits  © 2017 [employer] Information is for End User's use only and may not be sold, redistributed or otherwise used for commercial purposes  All illustrations and images included in CareNotes® are the copyrighted property of [employer] M [employer] Inc  or [employer]end[employer]  The above information is an  only  It is not intended as medical advice for individual conditions or treatments   Talk to your doctor, nurse or pharmacist before following any medical regimen to see if it is safe and effective for you  Benign Prostatic Hypertrophy   WHAT YOU NEED TO KNOW:   Benign prostatic hypertrophy (BPH) is a condition that causes your prostate gland to grow larger than normal  The prostate gland is the male sex gland that produces a fluid that is part of semen  It is about the size of a walnut and it is located under the bladder  As the prostate grows, it can squeeze the urethra  This can block urine flow and cause urinary problems  DISCHARGE INSTRUCTIONS:   Medicines:   · Alpha blockers: This medicine relaxes the muscles in your prostate and bladder  It may help you urinate more easily  · 5 alpha reductase inhibitors: These medicines block the production of a hormone that causes the prostate to get larger  It may help to slow the growth of the prostate or shrink the prostate  · Take your medicine as directed  Contact your healthcare provider if you think your medicine is not helping or if you have side effects  Tell him of her if you are allergic to any medicine  Keep a list of the medicines, vitamins, and herbs you take  Include the amounts, and when and why you take them  Bring the list or the pill bottles to follow-up visits  Carry your medicine list with you in case of an emergency  Follow up with your healthcare provider as directed:  Write down your questions so you remember to ask them during your visits  Manage BPH:   · Do not let your bladder get too full before you empty it  Urinate when you feel the urge  · Limit alcohol  Do not drink large amounts of any liquid at one time  · Decrease the amount of salt you eat  Examples of salty foods are chips, cured meats, and canned soups  Do not use table salt  · Healthcare providers may tell you not to eat spicy foods such as chilli peppers  This may help you find out if spicy food makes your BPH symptoms worse      · You may have sex if you feel well  Contact your healthcare provider if:   · There is a large amount of blood in your urine  · Your signs and symptoms get worse  · You have a fever  · You have questions or concerns about your condition or care  Return to the emergency department if:   · You are unable to urinate  · Your bladder feels very full and painful  © 2017 [employer] Information is for End User's use only and may not be sold, redistributed or otherwise used for commercial purposes  All illustrations and images included in CareNotes® are the copyrighted property of [employer]  or [employer] Mcclend[employer]  The above information is an  only  It is not intended as medical advice for individual conditions or treatments  Talk to your doctor, nurse or pharmacist before following any medical regimen to see if it is safe and effective for you  Urinary Leg Bag   WHAT YOU NEED TO KNOW:   What is a urinary leg bag? A urinary leg bag holds urine that drains from your catheter  It fits under your clothes and allows you to do your normal daily activities  How do I use a urinary leg bag? · Wash your hands  before and after you touch your catheter, tubing, or drainage bag  Use soap and water  This reduces the risk of infection  · Strap your leg bag to your thigh or calf  Make sure the straps are comfortable  The straps can cause problems with blood flow in your leg if they are too tight  · Clean the tip of the drainage tube with alcohol  before attaching it to your catheter  This helps prevent bacteria from getting into your catheter  · The connecting tube should not pull on your catheter  Skin breakdown can occur if there is constant pulling on the catheter  · Check the tube often to make sure it is not kinked or twisted  Blockage in the tube can cause urine to back up into your bladder  Your urine must flow straight through the tube into your leg bag       · Always keep the leg bag below your bladder  This prevents urine from the bag going back into your bladder, which may cause an infection  · Empty your leg bag when it is ½ full, or every 3 hours  A full bag may break or disconnect from the catheter  · Change to your bedside bag before you go to bed  Your bedside bag can hold more urine  Do not use your leg bag at night because it could become too full or break  · Clean your leg bag after every use  Fill the bag with 2 parts vinegar and 3 parts water  Let it soak for 20 minutes, then rinse and let dry  Follow your healthcare provider's instruction on replacing your leg bag with a new one  CARE AGREEMENT:   You have the right to help plan your care  Learn about your health condition and how it may be treated  Discuss treatment options with your caregivers to decide what care you want to receive  You always have the right to refuse treatment  The above information is an  only  It is not intended as medical advice for individual conditions or treatments  Talk to your doctor, nurse or pharmacist before following any medical regimen to see if it is safe and effective for you  © 2017 [employer]  Information is for End User's use only and may not be sold, redistributed or otherwise used for commercial purposes  All illustrations and images included in CareNotes® are the copyrighted property of [employer]  or [employer] Mcclendon

## 2018-12-20 NOTE — OP NOTE
OPERATIVE REPORT  PATIENT NAME: Linus Orosco    :  1940  MRN: [id number]  Pt Location: [facility] OR ROOM 07    SURGERY DATE: 2018    Surgeon(s) and Role:     Lasha Ramey MD - Primary    Preop Diagnosis:  Benign prostatic hyperplasia with lower urinary tract symptoms, symptom details unspecified [N40 1]  Bladder tumor [D49 4]    Post-Op Diagnosis Codes: * Benign prostatic hyperplasia with lower urinary tract symptoms, symptom details unspecified [N40 1]     * Bladder tumor [D49 4]    Procedure(s) (LRB):  CYSTO, PHOTO SELECTIVE VAPORIZATION OF PROSTATE, B/L RETROGRADE PYELOGRAM, TURBT (N/A)    Specimen(s):  ID Type Source Tests Collected by Time Destination   1 : Bladder Tumor  Tissue Urinary Bladder TISSUE EXAM Jaye Menendez MD 2018 1205        Estimated Blood Loss:   Minimal    Drains:  Urethral Catheter Latex; Double-lumen 24 Fr  (Active)   Number of days: 0       Anesthesia Type:   General    Operative Indications:  Benign prostatic hyperplasia with lower urinary tract symptoms, symptom details unspecified [N40 1]  Bladder tumor [D49 4]      Operative Findings:  Normal urethra, trilobar prostatic hypertrophy approximately 30 g causing complete outflow obstruction  Ureteral orifices are unremarkable  The bladder is moderately trabeculated  On the right side of the bladder was a 1 cm papillary bladder tumor that appeared low-grade  The lesion was completely removed with the biopsy forceps and the base and surrounding mucosa fulgurated  Bilateral retrograde pyelography was unremarkable  The specimen was sent for pathology  GreenLight laser photoselective ablation of the prostate was performed without event  An excellent channel was created  The procedure was well tolerated  Complications:   None    Procedure and Technique:  The patient is brought to the operating room and properly identified    General anesthesia was administered he was placed in lithotomy position and prepped and draped in usual sterile fashion  Compression boots were employed  Intravenous antibiotic was administered  An appropriate time-out was performed  Cystourethroscopy was performed with a 25 Wolof cystoscope with findings as above  The cold cup biopsy forceps was used to remove the small papillary bladder tumor on the right side of the bladder  Several passes were taken to remove the lesion completely  Attempts were made to take a deep biopsy  The biopsy site and surrounding mucosa were then completely fulgurated  Hemostasis appeared adequate  Next, a tiger tail catheter and dilute Omnipaque were used to perform bilateral retrograde pyelography  Findings are as above  No filling defect, obstruction or stone was noted in either collecting system  Next, the 25 Wolof cystoscope was exchanged for the 23 Wolof continuous-flow cystoscope  The GreenLight laser fiber was placed into the bladder  Ureteral orifices and verumontanum were visualized  Beginning at the bladder neck the median lobe and bladder neck were taken down to level of prostate capsule at 80 w power  Right and left lobes were then lasered, 1st at [de-identified] w higher than it higher power to the level of prostate capsule  Apical tissue was lasered at [de-identified] w power with care to vaporize no further distally than the verumontanum  An excellent channel for urination was created  When vaporization was complete the verumontanum and ureteral orifices were noted to be unaffected by laser energy  Hemostasis in the bladder and prostatic fossa were confirmed  The cystoscope was removed under direct vision  A 24 Wolof Goins catheter was placed into the bladder with the aid of the catheter stylet  The balloon was filled to 45 cc  The catheter irrigated well and irrigant returned clear  The Goins was placed to straight drainage  Patient was awakened from anesthesia  He tolerated the procedure well    Blood loss was minimal   He was taken to the recovery room in satisfactory condition     I was present for the entire procedure    Patient Disposition:  PACU     SIGNATURE: Mel Modi MD  DATE: December 20, 2018  TIME: 12:48 PM

## 2018-12-20 NOTE — H&P (VIEW-ONLY)
Assessment/Plan:    No problem-specific Assessment & Plan notes found for this encounter  Diagnoses and all orders for this visit:    Urinary frequency    Benign prostatic hyperplasia with lower urinary tract symptoms, symptom details unspecified  -     Case request operating room: TRANSURETHRAL RESECTION OF PROSTATE (TURP) Green[facility]    TURBT; Standing  -     Case request operating room: [address] [place] [address] (TURP) Greenlight    TURBT    Bladder tumor  -     Case request operating room: TRANSURETHRAL RESECTION OF PROSTATE (TURP) [facility]    TURBT; Standing  -     Case request operating room: [address] [place] [address] (TURP) [facility]light    TURBT    Other orders  -     Diet NPO; Sips with meds; Standing  -     Place sequential compression device; Standing  -     Comprehensive metabolic panel; Standing  -     CBC and Platelet; Standing  -     ceFAZolin (ANCEF) 2,000 mg in dextrose 5 % 100 mL IVPB; Infuse 2,000 mg into a venous catheter once   -     metroNIDAZOLE (FLAGYL) IVPB (premix) 500 mg; Infuse 100 mL (500 mg total) into a venous catheter once           Subjective:      Patient ID: Esther Murillo is a 66 y o  male  HPI  BPH:   He notes incomplete emptying, weak stream   He denies other significant urinary symptoms  He denies gross hematuria, urinary tract infections or incontinence  He is taking terazosin and finasteride for his symptoms  Meds no longer working for him        The patient's AUA score is up from 17 to 20  His quality of life has deteriorated to levels and he is now "mostly dissatisfied"  Pt has urge incontinence and wears pads at times  They are a nuisance  AUA SYMPTOM SCORE      Most Recent Value   AUA SYMPTOM SCORE   How often have you had a sensation of not emptying your bladder completely after you finished urinating? 3   How often have you had to urinate again less than two hours after you finished urinating?   3   How often have you found you stopped and started again several times when you urinate? 2   How often have you found it difficult to postpone urination? 3   How often have you had a weak urinary stream?  2   How often have you had to push or strain to begin urination? 1   How many times did you most typically get up to urinate from the time you went to bed at night until the time you got up in the morning? 2   Quality of Life: If you were to spend the rest of your life with your urinary condition just the way it is now, how would you feel about that?  4   AUA SYMPTOM SCORE  16            Procedures  PROSTATE ULTRASOUND WITHOUT BIOPSY FOR UROLIFT PRE-OP EVALUATION    PREOPERATIVE DIAGNOSIS:    BPH with obstruction    POSTOPERATIVE DIAGNOSIS:  Same    OPERATION: Prostate ultrasound volume measurements    SURGEON:   Misty Tellez MD,FACS    PROCEDURE:    The patient was brought to the procedure room and positively identified  He was placed with his left side down  A prostate ultrasound was carried out  Measurements were taken of the width, depth and length of the prostate  No hypoechoic lesions were identified  The prostate volume was 35 grams  On ultrasound, the median lobe was seen bulging into the urinary bladder  This tissue will be difficult to manage with the UroLift and the patient would be better served with a GreenLight or formal transurethral resection  The procedure was completed  The patient tolerated well  He was discharged home in satisfactory condition  Impression:  See today's progress note    Bladder cancer: At the time of the patient's workup for BPH, he was found to have a 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        The following portions of the patient's history were reviewed and updated as appropriate: allergies, current medications, past family history, past medical history, past social history, past surgical history and problem list     Review of Systems    Constitutional: Negative for activity change and fatigue  Respiratory: Negative for shortness of breath and wheezing  Cardiovascular: Negative for chest pain  Hypertension  Gastrointestinal: Negative for abdominal pain  Endocrine:        Non-insulin  Dependent diabetes   Genitourinary: Negative for difficulty urinating, dysuria, frequency, hematuria and urgency  Musculoskeletal: Negative for back pain and gait problem  Skin: Negative  Allergic/Immunologic: Negative  Neurological: Negative  Psychiatric/Behavioral: Negative  Objective:      /80 (BP Location: Left arm, Patient Position: Sitting)   Pulse 68   Ht 5' 8" (1 727 m)   Wt 89 8 kg (198 lb)   BMI 30 11 kg/m²           Physical Exam   Constitutional: He is oriented to person, place, and time  He appears well-developed and well-nourished  HENT:   Head: Normocephalic and atraumatic  Eyes: EOM are normal    Neck: Normal range of motion  Neck supple  Cardiovascular: Normal rate, regular rhythm and normal heart sounds  Pulmonary/Chest: Effort normal and breath sounds normal    Abdominal: Soft  There is no tenderness  Genitourinary:   Genitourinary Comments: The prostate is approximately 30 g, firm, smooth, non-tender  Musculoskeletal: Normal range of motion  Neurological: He is alert and oriented to person, place, and time  Skin: Skin is warm and dry  Psychiatric: He has a normal mood and affect   His behavior is normal  Judgment and thought content normal

## 2018-12-21 ENCOUNTER — CLINICAL SUPPORT (OUTPATIENT)
Dept: UROLOGY | Facility: MEDICAL CENTER | Age: 78
End: 2018-12-21

## 2018-12-21 VITALS
BODY MASS INDEX: 30.62 KG/M2 | HEIGHT: 68 IN | HEART RATE: 73 BPM | DIASTOLIC BLOOD PRESSURE: 62 MMHG | WEIGHT: 202 LBS | SYSTOLIC BLOOD PRESSURE: 122 MMHG

## 2018-12-21 DIAGNOSIS — R31.0 GROSS HEMATURIA: Primary | ICD-10-CM

## 2018-12-21 LAB
LEFT EYE DIABETIC RETINOPATHY: NORMAL
RIGHT EYE DIABETIC RETINOPATHY: NORMAL

## 2018-12-21 PROCEDURE — 99024 POSTOP FOLLOW-UP VISIT: CPT

## 2018-12-21 NOTE — PROGRESS NOTES
Pt presents with c/o leaking around catheter  Urine in bag in draining clear light pink without any clots  I irrigated the sweet catheter with 50cc of sterile water X 3  Urine returns freely,  clear light pink without clots  The pad in his pants has a very small amount of serosanguinous drainage, which is not uncommon for the surgery he had yesterday  The catheter was not secured to the statlock properly and could have been the reason for the pain in tip of penis  I secured the statlock properly and he said it felt better     I encouraged him to drink fluids, especially water and to return on Monday for sweet removal

## 2018-12-21 NOTE — PROGRESS NOTES
Irrigation of bladder  Date/Time: 12/21/2018 9:52 AM  Performed by: Carmen Root  Authorized by: Arlene Phillips   Consent: Verbal consent obtained  Consent given by: patient  Patient understanding: patient states understanding of the procedure being performed  Patient identity confirmed: verbally with patient  Preparation: Patient was prepped and draped in the usual sterile fashion    Local anesthesia used: no    Anesthesia:  Local anesthesia used: no

## 2018-12-24 ENCOUNTER — PROCEDURE VISIT (OUTPATIENT)
Dept: UROLOGY | Facility: MEDICAL CENTER | Age: 78
End: 2018-12-24

## 2018-12-24 VITALS
BODY MASS INDEX: 30.62 KG/M2 | HEIGHT: 68 IN | HEART RATE: 67 BPM | WEIGHT: 202 LBS | SYSTOLIC BLOOD PRESSURE: 138 MMHG | DIASTOLIC BLOOD PRESSURE: 68 MMHG

## 2018-12-24 DIAGNOSIS — N40.1 BPH WITH OBSTRUCTION/LOWER URINARY TRACT SYMPTOMS: Primary | ICD-10-CM

## 2018-12-24 DIAGNOSIS — N13.8 BPH WITH OBSTRUCTION/LOWER URINARY TRACT SYMPTOMS: Primary | ICD-10-CM

## 2018-12-24 PROCEDURE — 99024 POSTOP FOLLOW-UP VISIT: CPT

## 2018-12-24 NOTE — PROGRESS NOTES
Patient returns for Goins removal  Goins removed without difficulty, patient tolerated procedure well  Urine draining clear pink without clots  Denies fever or urinary symptoms  Patient instructed to increase fluids, especially water,  and limit caffeine intake  To return to office if unable to void within 4-6 hours, or has increased discomfort  Pt knows to go to ER if unable to void during the holiday

## 2019-01-02 ENCOUNTER — TELEPHONE (OUTPATIENT)
Dept: INTERNAL MEDICINE CLINIC | Facility: CLINIC | Age: 79
End: 2019-01-02

## 2019-01-07 ENCOUNTER — OFFICE VISIT (OUTPATIENT)
Dept: CARDIOLOGY CLINIC | Facility: CLINIC | Age: 79
End: 2019-01-07
Payer: MEDICARE

## 2019-01-07 VITALS
HEART RATE: 60 BPM | BODY MASS INDEX: 30.62 KG/M2 | DIASTOLIC BLOOD PRESSURE: 80 MMHG | WEIGHT: 202 LBS | SYSTOLIC BLOOD PRESSURE: 158 MMHG | HEIGHT: 68 IN

## 2019-01-07 DIAGNOSIS — I25.10 CORONARY ARTERY DISEASE INVOLVING NATIVE CORONARY ARTERY OF NATIVE HEART WITHOUT ANGINA PECTORIS: Primary | ICD-10-CM

## 2019-01-07 DIAGNOSIS — I10 BENIGN ESSENTIAL HYPERTENSION: ICD-10-CM

## 2019-01-07 DIAGNOSIS — Z95.1 HX OF CABG: ICD-10-CM

## 2019-01-07 DIAGNOSIS — E78.2 MIXED HYPERLIPIDEMIA: ICD-10-CM

## 2019-01-07 PROCEDURE — 99204 OFFICE O/P NEW MOD 45 MIN: CPT | Performed by: INTERNAL MEDICINE

## 2019-01-08 PROBLEM — Z95.1 HX OF CABG: Status: ACTIVE | Noted: 2019-01-08

## 2019-01-08 PROBLEM — I25.2 CORONARY ARTERY DISEASE WITH HISTORY OF MYOCARDIAL INFARCTION WITHOUT HISTORY OF CABG: Status: RESOLVED | Noted: 2019-01-08 | Resolved: 2019-01-08

## 2019-01-08 PROBLEM — I25.10 CORONARY ARTERY DISEASE WITH HISTORY OF MYOCARDIAL INFARCTION WITHOUT HISTORY OF CABG: Status: ACTIVE | Noted: 2019-01-08

## 2019-01-08 PROBLEM — I25.10 CORONARY ARTERY DISEASE WITH HISTORY OF MYOCARDIAL INFARCTION WITHOUT HISTORY OF CABG: Status: RESOLVED | Noted: 2019-01-08 | Resolved: 2019-01-08

## 2019-01-08 PROBLEM — I25.2 CORONARY ARTERY DISEASE WITH HISTORY OF MYOCARDIAL INFARCTION WITHOUT HISTORY OF CABG: Status: ACTIVE | Noted: 2019-01-08

## 2019-01-08 NOTE — PROGRESS NOTES
Consultation - Cardiology   Mihai Crowe 66 y o  male MRN: [id number]  Unit/Bed#:  Encounter: [id number]  Physician Requesting Consult: No att  providers found  Reason for Consult / Principal Problem: CAD    Assessment:  1  CAD  2  Hx of CABG  3  Hyperlipidemia  4  HTN    Plan:  His cardiac status appear to be stable  His BP is elevated today which he will keep an eye on  I will order an ECHO and a regular EST  His BP will be re-evaluated at his stress test   Further recommendations will be based on his test results  RTO 6 months  History of Present Illness     HPI: Mihai Crowe is a 66y o  year old male with a history of CAD  He is a former patient of Dr Coleman Laughter  He had an MI in 1998 and underwent cardiac cath at that time  No PCI was done  He subsequently had recurrent anginal symptoms in 2014 and had repeat cath  Multi-vessel disease was found and he had CABG x 4  He has not had any major cardiac problems since that time  He is not very active  He denies CP or significant SOB  He has HTN, DM, hypercholesterolemia  He is a former smoker  BP has not been optimally controlled     LDL cholesterol 8/10/2018 -  68      Review of Systems:    Alert awake oriented, comfortable, denies any complaints  No fevers chills nausea vomiting  No weakness, dizziness, seizures  no cough, shortness of breath, or wheezing  Denies any palpitations, chest pain, diaphoresis  Denies leg edema, pain or paresthesias  Denies any skin rashes  Denies abdominal pain, bloody stools, masses  Denies any depression or suicidal ideations      Historical Information   Past Medical History:   Diagnosis Date    Arthritis     Hands    Wright esophagus     BPH with obstruction/lower urinary tract symptoms     CAD (coronary artery disease)     Last assessed 09/16/15    Cataract, acquired     Last assessed 10/10/17    Diabetes mellitus (HCC)     NIDDM    Diabetic neuropathy ([facility] [address] )     Feet    Enlarged prostate with lower urinary tract symptoms (LUTS)     Last assessed 10/10/17    Erectile dysfunction     GERD (gastroesophageal reflux disease)     Last assessed 10/10/17    Hemoptysis     Last assessed 03/14/16    Hypercholesterolemia     Last assessed 10/10/17    Hypertension     Last assessed 10/10/17    Macular degeneration     Right eye is particularly affected    Myocardial infarction ([facility] [address] ) 1998    OAB (overactive bladder)     Testicular hypofunction     Testicular hypogonadism     Last assessed 98/68/54    Umbilical hernia     Last assessed 06/18/14    Urge incontinence of urine     Wears glasses      Past Surgical History:   Procedure Laterality Date    COLONOSCOPY      CORONARY ARTERY BYPASS GRAFT  07/16/2014    ABG x 4 LIMA to LAD,SVG to diagnoal 2 SVG to OM-1, SVG to PDA, resection of partial plerual mass    CYSTOSCOPY  2013    ESOPHAGOGASTRODUODENOSCOPY      FL RETROGRADE PYELOGRAM  12/20/2018    INGUINAL HERNIA REPAIR Bilateral 2015    PHOTODYNAMIC THERAPY      For Barretts esophagus    NH COLONOSCOPY FLX DX W/COLLJ SPEC WHEN PFRMD N/A 4/25/2016    Procedure: COLONOSCOPY;  Surgeon: Rachel Laughlin MD;  Location: [facility];   Service: Gastroenterology    TONSILLECTOMY      TRANSURETHRAL RESECTION OF PROSTATE N/A 12/20/2018    Procedure: CYSTO, PHOTO SELECTIVE VAPORIZATION OF PROSTATE, B/L RETROGRADE PYELOGRAM, TURBT;  Surgeon: Froylan Peter MD;  Location: [facility];  Service: Urology    UMBILICAL HERNIA REPAIR  2012     History   Alcohol Use    Yes     Comment: 1 drink daily- a mixed drink or wine     History   Drug Use No     History   Smoking Status    Former Smoker    Packs/day: 1 00    Years: 13 00    Types: Cigarettes    Quit date: 1972   Smokeless Tobacco    Never Used     Family History: non-contributory    Meds/Allergies   all current active meds have been reviewed  Allergies   Allergen Reactions    Morphine And Related Other (See Comments)     While having an MI patient received morphine and had adverse reaction but doesn't know what happened  Objective   Vitals: Blood pressure 158/80, pulse 60, height 5' 8" (1 727 m), weight 91 6 kg (202 lb)  , Body mass index is 30 71 kg/m²  ,   Weight (last 2 days)     Date/Time   Weight    01/07/19 1445  91 6 (202)                        Physical Exam:  GEN: Vivi Cintron appears well, alert and oriented x 3, pleasant and cooperative   HEENT: pupils equal, round, and reactive to light; extraocular muscles intact  NECK: supple, no carotid bruits   HEART: regular rhythm, normal S1 and S2, no murmurs, clicks, gallops or rubs   LUNGS: clear to auscultation bilaterally; no wheezes, rales, or rhonchi   ABDOMEN: normal bowel sounds, soft, no tenderness, no distention  EXTREMITIES: peripheral pulses normal; no clubbing, cyanosis, or edema  NEURO: no focal findings   SKIN: normal without suspicious lesions on exposed skin    Lab Results:   No visits with results within 1 Day(s) from this visit  Latest known visit with results is:   Orders Only on 12/21/2018   Component Date Value Ref Range Status    Right Eye Diabetic Retinopathy 12/21/2018 Mild   Final    Left Eye Diabetic Retinopathy 12/21/2018 Mild   Final                       Imaging: I have personally reviewed pertinent reports

## 2019-01-10 ENCOUNTER — OFFICE VISIT (OUTPATIENT)
Dept: UROLOGY | Facility: MEDICAL CENTER | Age: 79
End: 2019-01-10
Payer: MEDICARE

## 2019-01-10 VITALS
HEIGHT: 68 IN | WEIGHT: 196 LBS | BODY MASS INDEX: 29.7 KG/M2 | DIASTOLIC BLOOD PRESSURE: 76 MMHG | HEART RATE: 52 BPM | SYSTOLIC BLOOD PRESSURE: 124 MMHG

## 2019-01-10 DIAGNOSIS — N40.0 BPH WITHOUT OBSTRUCTION/LOWER URINARY TRACT SYMPTOMS: ICD-10-CM

## 2019-01-10 DIAGNOSIS — C67.2 MALIGNANT NEOPLASM OF LATERAL WALL OF URINARY BLADDER (HCC): Primary | ICD-10-CM

## 2019-01-10 PROBLEM — N40.1 BENIGN PROSTATIC HYPERPLASIA WITH LOWER URINARY TRACT SYMPTOMS: Status: RESOLVED | Noted: 2018-10-10 | Resolved: 2019-01-10

## 2019-01-10 LAB
SL AMB  POCT GLUCOSE, UA: NEGATIVE
SL AMB LEUKOCYTE ESTERASE,UA: ABNORMAL
SL AMB POCT BILIRUBIN,UA: NEGATIVE
SL AMB POCT BLOOD,UA: ABNORMAL
SL AMB POCT CLARITY,UA: CLEAR
SL AMB POCT COLOR,UA: YELLOW
SL AMB POCT KETONES,UA: NEGATIVE
SL AMB POCT NITRITE,UA: NEGATIVE
SL AMB POCT PH,UA: 6
SL AMB POCT SPECIFIC GRAVITY,UA: 1.01
SL AMB POCT URINE PROTEIN: NEGATIVE
SL AMB POCT UROBILINOGEN: 0.2

## 2019-01-10 PROCEDURE — 99213 OFFICE O/P EST LOW 20 MIN: CPT | Performed by: UROLOGY

## 2019-01-10 PROCEDURE — 81003 URINALYSIS AUTO W/O SCOPE: CPT | Performed by: UROLOGY

## 2019-01-10 NOTE — PROGRESS NOTES
Assessment/Plan:      Diagnoses and all orders for this visit:    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  -     POCT urine dip auto non-scope    Malignant neoplasm of lateral wall of urinary bladder (HCC)          Subjective:     Patient ID: Brinda See is a 66 y o  male  HPI  BPH:  The patient had a GreenLight laser transurethral resection of his prostate on December 20, 2018  He notes nocturia x 2  He denies other significant urinary symptoms  He denies gross hematuria, urinary tract infections or incontinence  He is taking terazosin and finasteride for his symptoms  He is also taking VESIcare for overactive bladder/urgency symptoms  Pt told to stop finasteride and terazosin now  Stop Vesicare in about 4 weeks after urgency subsides  Last passed blood a few days ago  AUA SYMPTOM SCORE      Most Recent Value   AUA SYMPTOM SCORE   How often have you had a sensation of not emptying your bladder completely after you finished urinating? 3   How often have you had to urinate again less than two hours after you finished urinating? 1   How often have you found you stopped and started again several times when you urinate? 1   How often have you found it difficult to postpone urination? 3   How often have you had a weak urinary stream?  1   How often have you had to push or strain to begin urination? 1   How many times did you most typically get up to urinate from the time you went to bed at night until the time you got up in the morning? 2   Quality of Life: If you were to spend the rest of your life with your urinary condition just the way it is now, how would you feel about that?  2   AUA SYMPTOM SCORE  12        Bladder cancer:  High grade superficial 1 cm tumor on right lateral wall resected  No invasion  Reviewed path and need for surveillance with pt  Review of Systems    Constitutional: Negative for activity change and fatigue     Respiratory: Negative for shortness of breath and wheezing  Cardiovascular: Negative for chest pain  Hypertension  Gastrointestinal: Negative for abdominal pain  Endocrine:        Non-insulin  Dependent diabetes   Genitourinary: Negative for difficulty urinating, dysuria, frequency, hematuria and urgency  Musculoskeletal: Negative for back pain and gait problem  Skin: Negative  Allergic/Immunologic: Negative  Neurological: Negative  Psychiatric/Behavioral: Negative  Objective:     Physical Exam   Constitutional: He is oriented to person, place, and time  He appears well-developed and well-nourished  HENT:   Head: Normocephalic and atraumatic  Eyes: EOM are normal    Neck: Normal range of motion  Pulmonary/Chest: Effort normal    Musculoskeletal: Normal range of motion  Neurological: He is alert and oriented to person, place, and time  Skin: Skin is warm and dry  Psychiatric: He has a normal mood and affect   His behavior is normal  Judgment and thought content normal

## 2019-01-10 NOTE — ASSESSMENT & PLAN NOTE
According to the pathology report, this tumor was completely resected  Surveillance cystoscopy in 3 months

## 2019-01-10 NOTE — LETTER
January 10, 2019     Damion Leon DO  [address]  [address] [place] [id number]    Patient: Liliane Pereira   YOB: 1940   Date of Visit: 1/10/2019       Dear Dr Criselda Nettles: Thank you for referring Jennifer Khalil to me for evaluation  Below are my notes for this consultation  If you have questions, please do not hesitate to call me  I look forward to following your patient along with you  Sincerely,        Regino Mukherjee MD        CC: No Recipients  Regino Mukherjee MD  1/10/2019 12:16 PM  Sign at close encounter  Assessment/Plan:      Diagnoses and all orders for this visit:    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  -     POCT urine dip auto non-scope    Malignant neoplasm of lateral wall of urinary bladder (HCC)          Subjective:     Patient ID: Liliane Pereira is a 66 y o  male  HPI  BPH:  The patient had a GreenLight laser transurethral resection of his prostate on December 20, 2018  He notes nocturia x 2  He denies other significant urinary symptoms  He denies gross hematuria, urinary tract infections or incontinence  He is taking terazosin and finasteride for his symptoms  He is also taking VESIcare for overactive bladder/urgency symptoms  Pt told to stop finasteride and terazosin now  Stop Vesicare in about 4 weeks after urgency subsides  Last passed blood a few days ago  AUA SYMPTOM SCORE      Most Recent Value   AUA SYMPTOM SCORE   How often have you had a sensation of not emptying your bladder completely after you finished urinating? 3   How often have you had to urinate again less than two hours after you finished urinating? 1   How often have you found you stopped and started again several times when you urinate? 1   How often have you found it difficult to postpone urination? 3   How often have you had a weak urinary stream?  1   How often have you had to push or strain to begin urination?   1   How many times did you most typically get up to urinate from the time you went to bed at night until the time you got up in the morning? 2   Quality of Life: If you were to spend the rest of your life with your urinary condition just the way it is now, how would you feel about that?  2   AUA SYMPTOM SCORE  12        Bladder cancer:  High grade superficial 1 cm tumor on right lateral wall resected  No invasion  Reviewed path and need for surveillance with pt  Review of Systems    Constitutional: Negative for activity change and fatigue  Respiratory: Negative for shortness of breath and wheezing  Cardiovascular: Negative for chest pain  Hypertension  Gastrointestinal: Negative for abdominal pain  Endocrine:        Non-insulin  Dependent diabetes   Genitourinary: Negative for difficulty urinating, dysuria, frequency, hematuria and urgency  Musculoskeletal: Negative for back pain and gait problem  Skin: Negative  Allergic/Immunologic: Negative  Neurological: Negative  Psychiatric/Behavioral: Negative  Objective:     Physical Exam   Constitutional: He is oriented to person, place, and time  He appears well-developed and well-nourished  HENT:   Head: Normocephalic and atraumatic  Eyes: EOM are normal    Neck: Normal range of motion  Pulmonary/Chest: Effort normal    Musculoskeletal: Normal range of motion  Neurological: He is alert and oriented to person, place, and time  Skin: Skin is warm and dry  Psychiatric: He has a normal mood and affect   His behavior is normal  Judgment and thought content normal

## 2019-01-22 ENCOUNTER — OFFICE VISIT (OUTPATIENT)
Dept: INTERNAL MEDICINE CLINIC | Facility: CLINIC | Age: 79
End: 2019-01-22
Payer: MEDICARE

## 2019-01-22 VITALS
OXYGEN SATURATION: 98 % | DIASTOLIC BLOOD PRESSURE: 70 MMHG | BODY MASS INDEX: 30.01 KG/M2 | TEMPERATURE: 98.2 F | WEIGHT: 198 LBS | HEIGHT: 68 IN | HEART RATE: 59 BPM | SYSTOLIC BLOOD PRESSURE: 128 MMHG

## 2019-01-22 DIAGNOSIS — K22.70 BARRETT'S ESOPHAGUS WITH ESOPHAGITIS: Primary | ICD-10-CM

## 2019-01-22 DIAGNOSIS — N32.81 OVERACTIVE BLADDER: ICD-10-CM

## 2019-01-22 DIAGNOSIS — E78.2 MIXED HYPERLIPIDEMIA: ICD-10-CM

## 2019-01-22 DIAGNOSIS — N17.9 ACUTE KIDNEY INJURY (HCC): ICD-10-CM

## 2019-01-22 DIAGNOSIS — I10 BENIGN ESSENTIAL HYPERTENSION: ICD-10-CM

## 2019-01-22 DIAGNOSIS — N40.0 BPH WITHOUT OBSTRUCTION/LOWER URINARY TRACT SYMPTOMS: ICD-10-CM

## 2019-01-22 DIAGNOSIS — K20.90 BARRETT'S ESOPHAGUS WITH ESOPHAGITIS: Primary | ICD-10-CM

## 2019-01-22 DIAGNOSIS — M54.50 CHRONIC BILATERAL LOW BACK PAIN WITHOUT SCIATICA: ICD-10-CM

## 2019-01-22 DIAGNOSIS — E11.59 TYPE 2 DIABETES MELLITUS WITH OTHER CIRCULATORY COMPLICATION, WITHOUT LONG-TERM CURRENT USE OF INSULIN (HCC): ICD-10-CM

## 2019-01-22 DIAGNOSIS — C67.2 MALIGNANT NEOPLASM OF LATERAL WALL OF URINARY BLADDER (HCC): ICD-10-CM

## 2019-01-22 DIAGNOSIS — E11.3293 TYPE 2 DIABETES MELLITUS WITH BOTH EYES AFFECTED BY MILD NONPROLIFERATIVE RETINOPATHY WITHOUT MACULAR EDEMA, WITHOUT LONG-TERM CURRENT USE OF INSULIN (HCC): ICD-10-CM

## 2019-01-22 DIAGNOSIS — E66.9 OBESITY (BMI 30.0-34.9): ICD-10-CM

## 2019-01-22 DIAGNOSIS — G89.29 CHRONIC BILATERAL LOW BACK PAIN WITHOUT SCIATICA: ICD-10-CM

## 2019-01-22 DIAGNOSIS — Z95.1 HX OF CABG: ICD-10-CM

## 2019-01-22 DIAGNOSIS — Z23 ENCOUNTER FOR IMMUNIZATION: ICD-10-CM

## 2019-01-22 DIAGNOSIS — D49.4 BLADDER TUMOR: ICD-10-CM

## 2019-01-22 PROCEDURE — 90662 IIV NO PRSV INCREASED AG IM: CPT | Performed by: INTERNAL MEDICINE

## 2019-01-22 PROCEDURE — G0008 ADMIN INFLUENZA VIRUS VAC: HCPCS | Performed by: INTERNAL MEDICINE

## 2019-01-22 PROCEDURE — 99214 OFFICE O/P EST MOD 30 MIN: CPT | Performed by: INTERNAL MEDICINE

## 2019-01-22 NOTE — PROGRESS NOTES
Assessment/Plan:    DMII (diabetes mellitus, type 2) ([facility] [facility] )  Lab Results   Component Value Date    HGBA1C 5 9 11/12/2018       No results for input(s): POCGLU in the last 72 hours  Blood Sugar Average: Last 72 hrs:   patient is no longer being seen by his endocrinologist   As noted his last hemoglobin A1c is 5 9 showing adequate control his diabetes with medication and diet  Patient was given a slip to check on a fasting blood sugar, hemoglobin A1c with his next visit  Patient will continue present medication  We told the patient the importance of being careful with his intake of concentrated sweets and simple carbohydrates  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)  Lab Results   Component Value Date    HGBA1C 5 9 11/12/2018       No results for input(s): POCGLU in the last 72 hours  Blood Sugar Average: Last 72 hrs:   as mentioned patient's hemoglobin A1c shows decent control  Patient continues to follow-up with his ophthalmologist who with his diabetic eye disease  Patient denies any new visual changes or problems  Benign essential hypertension  Blood pressure is controlled  Patient will continue with present medication and evaluation  He also has his blood pressure checked with his cardiologist, nephrologist, urologist     Bladder tumor  Patient recently had resection of bladder tumor  The diagnosis with a high-grade tumor but noninvasive  Patient will continue with surveillance  Diagnoses and all orders for this visit:    Wright's esophagus with esophagitis    Type 2 diabetes mellitus with other circulatory complication, without long-term current use of insulin ([facility] [place] [facility] )  -     Comprehensive metabolic panel; Future  -     CBC and differential; Future  -     Lipid panel; Future  -     Hemoglobin A1C; Future  -     Microalbumin / creatinine urine ratio  -     Urinalysis with reflex to microscopic;  Future    Type 2 diabetes mellitus with both eyes affected by mild nonproliferative retinopathy without macular edema, without long-term current use of insulin (HCC)    Benign essential hypertension  -     Comprehensive metabolic panel; Future  -     CBC and differential; Future    Acute kidney injury ([address] )    Overactive bladder    Bladder tumor    Malignant neoplasm of lateral wall of urinary bladder (HCC)    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    Chronic bilateral low back pain without sciatica    Mixed hyperlipidemia  -     Comprehensive metabolic panel; Future  -     CBC and differential; Future  -     Lipid panel; Future    Hx of CABG  -     Comprehensive metabolic panel; Future    Encounter for immunization  -     influenza vaccine, [id number], high-dose, PF 0 5 mL, for patients 65 yr+ (FLUZONE HIGH-DOSE)          Subjective:      Patient ID: Yolanda Carvajal is a 66 y o  male  Patient is a 72-year-old male with a history of multiple medical problems as outlined previously  Patient is here today for re-evaluation  Since last seen patient did have resection of a bladder tumor and also surgery in order to help with his BPH which was done by laser  Patient states he is doing well with no new complaints  He did have labs performed prior to the visit we did discuss the results  We are continuing to monitor his renal function which is stable at this point time  He is now being seen by a new cardiologist and they are planning to for him to have a cardiac stress test in the near future  The following portions of the patient's history were reviewed and updated as appropriate:   He  has a past medical history of Acute kidney injury ([address] ); Arthritis; Wright esophagus; BPH with obstruction/lower urinary tract symptoms; CAD (coronary artery disease); Cataract, acquired; Diabetes mellitus ([address] ); Diabetic neuropathy ([address] ); Enlarged prostate with lower urinary tract symptoms (LUTS);  Erectile dysfunction; GERD (gastroesophageal reflux disease); Hemoptysis; Hypercholesterolemia; Hypertension; Macular degeneration; Myocardial infarction ([place] [address] ) (1998); OAB (overactive bladder); Testicular hypofunction; Testicular hypogonadism; Umbilical hernia; Urge incontinence of urine; and Wears glasses  He   Patient Active Problem List    Diagnosis Date Noted    Malignant neoplasm of lateral wall of urinary bladder ([place] [address] ) 01/10/2019    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 01/10/2019    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/2019    Type 2 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 ([place] [address] ) 12/05/2018    Bladder tumor 11/01/2018    Overactive bladder 10/10/2018    Acute kidney injury ([place] [address] ) 05/30/2018    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    Backache 10/21/2013    Benign essential hypertension 10/11/2012    DMII (diabetes mellitus, type 2) ([place] [address] ) 10/11/2012    Hyperlipidemia 10/11/2012     He  has a past surgical history that includes pr colonoscopy flx dx w/collj spec when pfrmd (N/A, 4/25/2016); Cystoscopy (2013); Coronary artery bypass graft (07/16/2014); Colonoscopy; Inguinal hernia repair (Bilateral, 2015); Umbilical hernia repair (2012); Esophagogastroduodenoscopy; Tonsillectomy; Photodynamic Therapy; Transurethral resection of prostate (N/A, 12/20/2018); and FL retrograde pyelogram (12/20/2018)  His family history includes Cancer in his mother; Coronary artery disease in his father; Diabetes in his father; Heart disease in his father; Hyperlipidemia in his father; Hypertension in his father; Other in his mother  He  reports that he quit smoking about 47 years ago  His smoking use included Cigarettes  He has a 13 00 pack-year smoking history  He has never used smokeless tobacco  He reports that he drinks alcohol  He reports that he does not use drugs  Current Outpatient Prescriptions   Medication Sig Dispense Refill    aspirin 81 MG tablet Take 81 mg by mouth daily   irbesartan (AVAPRO) 300 mg tablet Take 300 mg by mouth every morning        metFORMIN (GLUCOPHAGE-XR) 500 mg 24 hr tablet Take 1 tablet (500 mg total) by mouth 2 (two) times a day with meals 180 tablet 3    metoprolol tartrate (LOPRESSOR) 50 mg tablet Take 50 mg by mouth 2 (two) times a day   Multiple Vitamins-Minerals (CENTRUM SILVER 50+MEN PO) Take 1 tablet by mouth daily        Multiple Vitamins-Minerals (OCUVITE-LUTEIN PO) Take 1 tablet by mouth 2 (two) times a day        Omega-3 Fatty Acids (FISH OIL) 1200 MG CAPS Take 1 capsule by mouth daily        omeprazole (PriLOSEC) 40 MG capsule Take 40 mg by mouth daily at bedtime        simvastatin (ZOCOR) 20 mg tablet Take 20 mg by mouth daily at bedtime   VESICARE 10 MG tablet TAKE ONE TABLET BY MOUTH EVERY DAY 90 tablet 2    finasteride (PROSCAR) 5 mg tablet TAKE ONE TABLET BY MOUTH EVERY DAY (Patient not taking: Reported on 1/22/2019) 90 tablet 3    terazosin (HYTRIN) 10 MG capsule TAKE ONE CAPSULE BY MOUTH EVERY DAY (Patient not taking: Reported on 1/22/2019) 90 capsule 3     No current facility-administered medications for this visit  Current Outpatient Prescriptions on File Prior to Visit   Medication Sig    aspirin 81 MG tablet Take 81 mg by mouth daily   irbesartan (AVAPRO) 300 mg tablet Take 300 mg by mouth every morning      metFORMIN (GLUCOPHAGE-XR) 500 mg 24 hr tablet Take 1 tablet (500 mg total) by mouth 2 (two) times a day with meals    metoprolol tartrate (LOPRESSOR) 50 mg tablet Take 50 mg by mouth 2 (two) times a day      Multiple Vitamins-Minerals (CENTRUM SILVER 50+MEN PO) Take 1 tablet by mouth daily      Multiple Vitamins-Minerals (OCUVITE-LUTEIN PO) Take 1 tablet by mouth 2 (two) times a day      Omega-3 Fatty Acids (FISH OIL) 1200 MG CAPS Take 1 capsule by mouth daily      omeprazole (PriLOSEC) 40 MG capsule Take 40 mg by mouth daily at bedtime      simvastatin (ZOCOR) 20 mg tablet Take 20 mg by mouth daily at bedtime   VESICARE 10 MG tablet TAKE ONE TABLET BY MOUTH EVERY DAY    finasteride (PROSCAR) 5 mg tablet TAKE ONE TABLET BY MOUTH EVERY DAY (Patient not taking: Reported on 1/22/2019)    terazosin (HYTRIN) 10 MG capsule TAKE ONE CAPSULE BY MOUTH EVERY DAY (Patient not taking: Reported on 1/22/2019)     No current facility-administered medications on file prior to visit  He is allergic to morphine and related       Review of Systems   Constitutional: Negative  HENT: Negative  Eyes: Negative  Respiratory: Negative  Cardiovascular: Negative  Gastrointestinal: Negative  Endocrine: Negative  Genitourinary: Negative  Musculoskeletal: Negative  Skin: Negative  Allergic/Immunologic: Negative  Neurological: Negative  Hematological: Negative  Psychiatric/Behavioral: Negative  Objective:      /70   Pulse 59   Temp 98 2 °F (36 8 °C)   Ht 5' 8" (1 727 m)   Wt 89 8 kg (198 lb)   SpO2 98%   BMI 30 11 kg/m²          Physical Exam   Constitutional: He is oriented to person, place, and time  He appears well-developed and well-nourished  No distress  Pleasant, overweight 72-year-old male who is awake alert in no acute distress and oriented x3   HENT:   Head: Normocephalic and atraumatic  Right Ear: External ear normal    Left Ear: External ear normal    Nose: Nose normal    Mouth/Throat: Oropharynx is clear and moist  No oropharyngeal exudate  Eyes: Pupils are equal, round, and reactive to light  Conjunctivae and EOM are normal  Right eye exhibits no discharge  Left eye exhibits no discharge  No scleral icterus  Neck: Normal range of motion  Neck supple  No JVD present  No tracheal deviation present  No thyromegaly present  Cardiovascular: Normal rate, regular rhythm, normal heart sounds and intact distal pulses  Exam reveals no gallop and no friction rub  No murmur heard  Pulmonary/Chest: Effort normal and breath sounds normal  No stridor   No respiratory distress  He has no wheezes  He has no rales  He exhibits no tenderness  Abdominal: Soft  Bowel sounds are normal  He exhibits no distension and no mass  There is no tenderness  There is no rebound and no guarding  Obese   Musculoskeletal: Normal range of motion  He exhibits deformity  He exhibits no edema or tenderness  Lymphadenopathy:     He has no cervical adenopathy  Neurological: He is alert and oriented to person, place, and time  He has normal reflexes  He displays normal reflexes  No cranial nerve deficit  Coordination normal    Skin: Skin is warm and dry  No rash noted  He is not diaphoretic  No erythema  No pallor  Psychiatric: He has a normal mood and affect  His behavior is normal  Judgment and thought content normal    Nursing note and vitals reviewed  BMI Counseling: Body mass index is 30 11 kg/m²  Discussed the patient's BMI with him  The BMI is above average  BMI counseling and education was provided to the patient  Nutrition recommendations include reducing portion sizes, decreasing overall calorie intake, reducing fast food intake, consuming healthier snacks, reducing intake of saturated fat and trans fat and reducing intake of cholesterol  Exercise recommendations include moderate aerobic physical activity for 150 minutes/week

## 2019-01-22 NOTE — ASSESSMENT & PLAN NOTE
Blood pressure is controlled  Patient will continue with present medication and evaluation    He also has his blood pressure checked with his cardiologist, nephrologist, urologist

## 2019-01-22 NOTE — ASSESSMENT & PLAN NOTE
Lab Results   Component Value Date    HGBA1C 5 9 11/12/2018       No results for input(s): POCGLU in the last 72 hours  Blood Sugar Average: Last 72 hrs:   patient is no longer being seen by his endocrinologist   As noted his last hemoglobin A1c is 5 9 showing adequate control his diabetes with medication and diet  Patient was given a slip to check on a fasting blood sugar, hemoglobin A1c with his next visit  Patient will continue present medication  We told the patient the importance of being careful with his intake of concentrated sweets and simple carbohydrates

## 2019-01-22 NOTE — ASSESSMENT & PLAN NOTE
Patient recently had resection of bladder tumor  The diagnosis with a high-grade tumor but noninvasive  Patient will continue with surveillance

## 2019-01-22 NOTE — PATIENT INSTRUCTIONS
Low Fat Diet   AMBULATORY CARE:   A low-fat diet  is an eating plan that is low in total fat, unhealthy fat, and cholesterol  You may need to follow a low-fat diet if you have trouble digesting or absorbing fat  You may also need to follow this diet if you have high cholesterol  You can also lower your cholesterol by increasing the amount of fiber in your diet  Soluble fiber is a type of fiber that helps to decrease cholesterol levels  Different types of fat in food:   · Limit unhealthy fats  A diet that is high in cholesterol, saturated fat, and trans fat may cause unhealthy cholesterol levels  Unhealthy cholesterol levels increase your risk of heart disease  ¨ Cholesterol:  Limit intake of cholesterol to less than 200 mg per day  Cholesterol is found in meat, eggs, and dairy  ¨ Saturated fat:  Limit saturated fat to less than 7% of your total daily calories  Ask your dietitian how many calories you need each day  Saturated fat is found in butter, cheese, ice cream, whole milk, and palm oil  Saturated fat is also found in meat, such as beef, pork, chicken skin, and processed meats  Processed meats include sausage, hot dogs, and bologna  ¨ Trans fat:  Avoid trans fat as much as possible  Trans fat is used in fried and baked foods  Foods that say trans fat free on the label may still have up to 0 5 grams of trans fat per serving  · Include healthy fats  Replace foods that are high in saturated and trans fat with foods high in healthy fats  This may help to decrease high cholesterol levels  ¨ Monounsaturated fats: These are found in avocados, nuts, and vegetable oils, such as olive, canola, and sunflower oil  ¨ Polyunsaturated fats: These can be found in vegetable oils, such as soybean or corn oil  Omega-3 fats can help to decrease the risk of heart disease  Omega-3 fats are found in fish, such as salmon, herring, trout, and tuna   Omega-3 fats can also be found in plant foods, such as walnuts, flaxseed, soybeans, and canola oil    Foods to limit or avoid:   · Grains:      ¨ Snacks that are made with partially hydrogenated oils, such as chips, regular crackers, and butter-flavored popcorn    ¨ High-fat baked goods, such as biscuits, croissants, doughnuts, pies, cookies, and pastries    · Dairy:      ¨ Whole milk, 2% milk, and yogurt and ice cream made with whole milk    ¨ Half and half creamer, heavy cream, and whipping cream    ¨ Cheese, cream cheese, and sour cream    · Meats and proteins:      ¨ High-fat cuts of meat (T-bone steak, regular hamburger, and ribs)    ¨ Fried meat, poultry (turkey and chicken), and fish    ¨ Poultry (chicken and turkey) with skin    ¨ Cold cuts (salami or bologna), hot dogs, torres, and sausage    ¨ Whole eggs and egg yolks    · Vegetables and fruits with added fat:      ¨ Fried vegetables or vegetables in butter or high-fat sauces, such as cream or cheese sauces    ¨ Fried fruit or fruit served with butter or cream    · Fats:      ¨ Butter, stick margarine, and shortening    ¨ Coconut, palm oil, and palm kernel oil  Foods to include:   · Grains:      ¨ Whole-grain breads, cereals, pasta, and brown rice    ¨ Low-fat crackers and pretzels    · Vegetables and fruits:      ¨ Fresh, frozen, or canned vegetables (no salt or low-sodium)    ¨ Fresh, frozen, dried, or canned fruit (canned in light syrup or fruit juice)    ¨ Avocado    · Low-fat dairy products:      ¨ Nonfat (skim) or 1% milk    ¨ Nonfat or low-fat cheese, yogurt, and cottage cheese    · Meats and proteins:      ¨ Chicken or turkey with no skin    ¨ Baked or broiled fish    ¨ Lean beef and pork (loin, round, extra lean hamburger)    ¨ Beans and peas, unsalted nuts, soy products    ¨ Egg whites and substitutes    ¨ Seeds and nuts    · Fats:      ¨ Unsaturated oil, such as canola, olive, peanut, soybean, or sunflower oil    ¨ Soft or liquid margarine and vegetable oil spread    ¨ Low-fat salad dressing  Other ways to decrease fat:   · Read food labels before you buy foods  Choose foods that have less than 30% of calories from fat  Choose low-fat or fat-free dairy products  Remember that fat free does not mean calorie free  These foods still contain calories, and too many calories can lead to weight gain  · Trim fat from meat and avoid fried food  Trim all visible fat from meat before you cook it  Remove the skin from poultry  Do not perez meat, fish, or poultry  Bake, roast, boil, or broil these foods instead  Avoid fried foods  Eat a baked potato instead of Western Allie fries  Steam vegetables instead of sautéing them in butter  · Add less fat to foods  Use imitation torres bits on salads and baked potatoes instead of regular torres bits  Use fat-free or low-fat salad dressings instead of regular dressings  Use low-fat or nonfat butter-flavored topping instead of regular butter or margarine on popcorn and other foods  Ways to decrease fat in recipes:  Replace high-fat ingredients with low-fat or nonfat ones  This may cause baked goods to be drier than usual  You may need to use nonfat cooking spray on pans to prevent food from sticking  You also may need to change the amount of other ingredients, such as water, in the recipe  Try the following:  · Use low-fat or light margarine instead of regular margarine or shortening  · Use lean ground turkey breast or chicken, or lean ground beef (less than 5% fat) instead of hamburger  · Add 1 teaspoon of canola oil to 8 ounces of skim milk instead of using cream or half and half  · Use grated zucchini, carrots, or apples in breads instead of coconut  · Use blenderized, low-fat cottage cheese, plain tofu, or low-fat ricotta cheese instead of cream cheese  · Use 1 egg white and 1 teaspoon of canola oil, or use ¼ cup (2 ounces) of fat-free egg substitute instead of a whole egg       · Replace half of the oil that is called for in a recipe with applesauce when you bake  Use 3 tablespoons of cocoa powder and 1 tablespoon of canola oil instead of a square of baking chocolate  How to increase fiber:  Eat enough high-fiber foods to get 20 to 30 grams of fiber every day  Slowly increase your fiber intake to avoid stomach cramps, gas, and other problems  · Eat 3 ounces of whole-grain foods each day  An ounce is about 1 slice of bread  Eat whole-grain breads, such as whole-wheat bread  Whole wheat, whole-wheat flour, or other whole grains should be listed as the first ingredient on the food label  Replace white flour with whole-grain flour or use half of each in recipes  Whole-grain flour is heavier than white flour, so you may have to add more yeast or baking powder  · Eat a high-fiber cereal for breakfast   Oatmeal is a good source of soluble fiber  Look for cereals that have bran or fiber in the name  Choose whole-grain products, such as brown rice, barley, and whole-wheat pasta  · Eat more beans, peas, and lentils  For example, add beans to soups or salads  Eat at least 5 cups of fruits and vegetables each day  Eat fruits and vegetables with the peel because the peel is high in fiber  © 2017 [address] [employer] Information is for End User's use only and may not be sold, redistributed or otherwise used for commercial purposes  All illustrations and images included in CareNotes® are the copyrighted property of [employer]  or [employer]  The above information is an  only  It is not intended as medical advice for individual conditions or treatments  Talk to your doctor, nurse or pharmacist before following any medical regimen to see if it is safe and effective for you  Heart Healthy Diet   AMBULATORY CARE:   A heart healthy diet  is an eating plan low in total fat, unhealthy fats, and sodium (salt)  A heart healthy diet helps decrease your risk for heart disease and stroke   Limit the amount of fat you eat to 25% to 35% of your total daily calories  Limit sodium to less than 2,300 mg each day  Healthy fats:  Healthy fats can help improve cholesterol levels  The risk for heart disease is decreased when cholesterol levels are normal  Choose healthy fats, such as the following:  · Unsaturated fat  is found in foods such as soybean, canola, olive, corn, and safflower oils  It is also found in soft tub margarine that is made with liquid vegetable oil  · Omega-3 fat  is found in certain fish, such as salmon, tuna, and trout, and in walnuts and flaxseed  Unhealthy fats:  Unhealthy fats can cause unhealthy cholesterol levels in your blood and increase your risk of heart disease  Limit unhealthy fats, such as the following:  · Cholesterol  is found in animal foods, such as eggs and lobster, and in dairy products made from whole milk  Limit cholesterol to less than 300 milligrams (mg) each day  You may need to limit cholesterol to 200 mg each day if you have heart disease  · Saturated fat  is found in meats, such as torres and hamburger  It is also found in chicken or turkey skin, whole milk, and butter  Limit saturated fat to less than 7% of your total daily calories  Limit saturated fat to less than 6% if you have heart disease or are at increased risk for it  · Trans fat  is found in packaged foods, such as potato chips and cookies  It is also in hard margarine, some fried foods, and shortening  Avoid trans fats as much as possible    Heart healthy foods and drinks to include:  Ask your dietitian or healthcare provider how many servings to have from each of the following food groups:  · Grains:      ¨ Whole-wheat breads, cereals, and pastas, and brown rice    ¨ Low-fat, low-sodium crackers and chips    · Vegetables:      ¨ Broccoli, green beans, green peas, and spinach    ¨ Collards, kale, and lima beans    ¨ Carrots, sweet potatoes, tomatoes, and peppers    ¨ Canned vegetables with no salt added    · Fruits:      ¨ Bananas, peaches, pears, and pineapple    ¨ Grapes, raisins, and dates    ¨ Oranges, tangerines, grapefruit, orange juice, and grapefruit juice    ¨ Apricots, mangoes, melons, and papaya    ¨ Raspberries and strawberries    ¨ Canned fruit with no added sugar    · Low-fat dairy products:      ¨ Nonfat (skim) milk, 1% milk, and low-fat almond, cashew, or soy milks fortified with calcium    ¨ Low-fat cheese, regular or frozen yogurt, and cottage cheese    · Meats and proteins , such as lean cuts of beef and pork (loin, leg, round), skinless chicken and turkey, legumes, soy products, egg whites, and nuts  Foods and drinks to limit or avoid:  Ask your dietitian or healthcare provider about these and other foods that are high in unhealthy fat, sodium, and sugar:  · Snack or packaged foods , such as frozen dinners, cookies, macaroni and cheese, and cereals with more than 300 mg of sodium per serving    · Canned or dry mixes  for cakes, soups, sauces, or gravies    · Vegetables with added sodium , such as instant potatoes, vegetables with added sauces, or regular canned vegetables    · Other foods high in sodium , such as ketchup, barbecue sauce, salad dressing, pickles, olives, soy sauce, and miso    · High-fat dairy foods  such as whole or 2% milk, cream cheese, or sour cream, and cheeses     · High-fat protein foods  such as high-fat cuts of beef (T-bone steaks, ribs), chicken or turkey with skin, and organ meats, such as liver    · Cured or smoked meats , such as hot dogs, torres, and sausage    · Unhealthy fats and oils , such as butter, stick margarine, shortening, and cooking oils such as coconut or palm oil    · Food and drinks high in sugar , such as soft drinks (soda), sports drinks, sweetened tea, candy, cake, cookies, pies, and doughnuts  Other diet guidelines to follow:   · Eat more foods containing omega-3 fats  Eat fish high in omega-3 fats at least 2 times a week  · Limit alcohol    Too much alcohol can damage your heart and raise your blood pressure  Women should limit alcohol to 1 drink a day  Men should limit alcohol to 2 drinks a day  A drink of alcohol is 12 ounces of beer, 5 ounces of wine, or 1½ ounces of liquor  · Choose low-sodium foods  High-sodium foods can lead to high blood pressure  Add little or no salt to food you prepare  Use herbs and spices in place of salt  · Eat more fiber  to help lower cholesterol levels  Eat at least 5 servings of fruits and vegetables each day  Eat 3 ounces of whole-grain foods each day  Legumes (beans) are also a good source of fiber  Lifestyle guidelines:   · Do not smoke  Nicotine and other chemicals in cigarettes and cigars can cause lung and heart damage  Ask your healthcare provider for information if you currently smoke and need help to quit  E-cigarettes or smokeless tobacco still contain nicotine  Talk to your healthcare provider before you use these products  · Exercise regularly  to help you maintain a healthy weight and improve your blood pressure and cholesterol levels  Ask your healthcare provider about the best exercise plan for you  Do not start an exercise program without asking your healthcare provider  Follow up with your healthcare provider as directed:  Write down your questions so you remember to ask them during your visits  © 2017 [facility] Information is for End User's use only and may not be sold, redistributed or otherwise used for commercial purposes  All illustrations and images included in CareNotes® are the copyrighted property of [employer]  or [employer] Mcclendon  The above information is an  only  It is not intended as medical advice for individual conditions or treatments  Talk to your doctor, nurse or pharmacist before following any medical regimen to see if it is safe and effective for you  Calorie Counting Diet   WHAT YOU NEED TO KNOW:   What is a calorie counting diet?   It is a meal plan based on counting calories each day to reach a healthy body weight  You will need to eat fewer calories if you are trying to lose weight  Weight loss may decrease your risk for certain health problems or improve your health if you have health problems  Some of these health problems include heart disease, high blood pressure, and diabetes  What foods should I avoid? Your dietitian will tell you if you need to avoid certain foods based on your body weight and health condition  You may need to avoid high-fat foods if you are at risk for or have heart disease  You may need to eat fewer foods from the breads and starches food group if you have diabetes  How many calories are in foods? The following is a list of foods and drinks with the approximate number of calories in each  Check the food label to find the exact number of calories  A dietitian can tell you how many calories you should have from each food group each day    · Carbohydrate:      ¨ ½ of a 3-inch bagel, 1 slice of bread, or ½ of a hamburger bun or hot dog bun (80)    ¨ 1 (8-inch) flour tortilla or ½ cup of cooked rice (100)    ¨ 1 (6-inch) corn tortilla (80)    ¨ 1 (6-inch) pancake or 1 cup of bran flakes cereal (110)    ¨ ½ cup of cooked cereal (80)    ¨ ½ cup of cooked pasta (85)    ¨ 1 ounce of pretzels (100)    ¨ 3 cups of air-popped popcorn without butter or oil (80)    · Dairy:      ¨ 1 cup of skim or 1% milk (90)    ¨ 1 cup of 2% milk (120)    ¨ 1 cup of whole milk (160)    ¨ 1 cup of 2% chocolate milk (220)    ¨ 1 ounce of low-fat cheese with 3 grams of fat per ounce (70)    ¨ 1 ounce of cheddar cheese (114)    ¨ ½ cup of 1% fat cottage cheese (80)    ¨ 1 cup of plain or sugar-free, fat-free yogurt (90)    · Protein foods:      ¨ 3 ounces of fish (not breaded or fried) (95)    ¨ 3 ounces of breaded, fried fish (195)    ¨ ¾ cup of tuna canned in water (105)    ¨ 3 ounces of chicken breast without skin (105)    ¨ 1 fried chicken breast with skin (350)    ¨ ¼ cup of fat free egg substitute (40)    ¨ 1 large egg (75)    ¨ 3 ounces of lean beef or pork (165)    ¨ 3 ounces of fried pork chop or ham (185)    ¨ ½ cup of cooked dried beans, such as kidney, turner, lentils, or navy (115)    ¨ 3 ounces of bologna or lunch meat (225)    ¨ 2 links of breakfast sausage (140)    · Vegetables:      ¨ ½ cup of sliced mushrooms (10)    ¨ 1 cup of salad greens, such as lettuce, spinach, or brice (15)    ¨ ½ cup of steamed asparagus (20)    ¨ ½ cup of cooked summer squash, zucchini squash, or green or wax beans (25)    ¨ 1 cup of broccoli or cauliflower florets, or 1 medium tomato (25)    ¨ 1 large raw carrot or ½ cup of cooked carrots (40)    ¨ ? of a medium cucumber or 1 stalk of celery (5)    ¨ 1 small baked potato (160)    ¨ 1 cup of breaded, fried vegetables (230)    · Fruit:      ¨ 1 (6-inch) banana (55)     ¨ ½ of a 4-inch grapefruit (55)    ¨ 15 grapes (60)    ¨ 1 medium orange or apple (70)    ¨ 1 large peach (65)    ¨ 1 cup of fresh pineapple chunks (75)    ¨ 1 cup of melon cubes (50)    ¨ 1¼ cups of whole strawberries (45)    ¨ ½ cup of fruit canned in juice (55)    ¨ ½ cup of fruit canned in heavy syrup (110)    ¨ ?  cup of raisins (130)    ¨ ½ cup of unsweetened fruit juice (60)    ¨ ½ cup of grape, cranberry, or prune juice (90)    · Fat:      ¨ 10 peanuts or 2 teaspoons of peanut butter (55)    ¨ 2 tablespoons of avocado or 1 tablespoon of regular salad dressing (45)    ¨ 2 slices of torres (90)    ¨ 1 teaspoon of oil, such as safflower, canola, corn, or olive oil (45)    ¨ 2 teaspoons of low-fat margarine, or 1 tablespoon of low-fat mayonnaise (50)    ¨ 1 teaspoon of regular margarine (40)    ¨ 1 tablespoon of regular mayonnaise (135)    ¨ 1 tablespoon of cream cheese or 2 tablespoons of low-fat cream cheese (45)    ¨ 2 tablespoons of vegetable shortening (215)    · Dessert and sweets:      ¨ 8 animal crackers or 5 vanilla wafers (80)    ¨ 1 frozen fruit juice bar (80)    ¨ ½ cup of ice milk or low-fat frozen yogurt (90)    ¨ ½ cup of sherbet or sorbet (125)    ¨ ½ cup of sugar-free pudding or custard (60)    ¨ ½ cup of ice cream (140)    ¨ ½ cup of pudding or custard (175)    ¨ 1 (2-inch) square chocolate brownie (185)    · Combination foods:      ¨ Bean burrito made with an 8-inch tortilla, without cheese (275)    ¨ Chicken breast sandwich with lettuce and tomato (325)    ¨ 1 cup of chicken noodle soup (60)    ¨ 1 beef taco (175)    ¨ Regular hamburger with lettuce and tomato (310)    ¨ Regular cheeseburger with lettuce and tomato (410)     ¨ ¼ of a 12-inch cheese pizza (280)    ¨ Fried fish sandwich with lettuce and tomato (425)    ¨ Hot dog and bun (275)    ¨ 1½ cups of macaroni and cheese (310)    ¨ Taco salad with a fried tortilla shell (870)    · Low-calorie foods:      ¨ 1 tablespoon of ketchup or 1 tablespoon of fat free sour cream (15)    ¨ 1 teaspoon of mustard (5)    ¨ ¼ cup of salsa (20)    ¨ 1 large dill pickle (15)    ¨ 1 tablespoon of fat free salad dressing (10)    ¨ 2 teaspoons of low-sugar, light jam or jelly, or 1 tablespoon of sugar-free syrup (15)    ¨ 1 sugar-free popsicle (15)    ¨ 1 cup of club soda, seltzer water, or diet soda (0)  CARE AGREEMENT:   You have the right to help plan your care  Discuss treatment options with your caregivers to decide what care you want to receive  You always have the right to refuse treatment  The above information is an  only  It is not intended as medical advice for individual conditions or treatments  Talk to your doctor, nurse or pharmacist before following any medical regimen to see if it is safe and effective for you  © 2017 [facility] Information is for End User's use only and may not be sold, redistributed or otherwise used for commercial purposes   All illustrations and images included in CareNotes® are the copyrighted property of [employer]  or [employer]

## 2019-01-22 NOTE — ASSESSMENT & PLAN NOTE
Lab Results   Component Value Date    HGBA1C 5 9 11/12/2018       No results for input(s): POCGLU in the last 72 hours  Blood Sugar Average: Last 72 hrs:   as mentioned patient's hemoglobin A1c shows decent control  Patient continues to follow-up with his ophthalmologist who with his diabetic eye disease  Patient denies any new visual changes or problems

## 2019-01-22 NOTE — ASSESSMENT & PLAN NOTE
Patient with his BMI is considered obese  Patient also has multiple medical problems as outlined previously including coronary artery disease and diabetes  We did reinforce with the patient today the importance of watching his diet, decreasing his caloric intake and getting some exercise on a routine basis in order to help lose weight    Patient understands and agrees and his weight will be checked with his next visit

## 2019-01-29 ENCOUNTER — TELEPHONE (OUTPATIENT)
Dept: ENDOCRINOLOGY | Facility: CLINIC | Age: 79
End: 2019-01-29

## 2019-03-11 ENCOUNTER — OFFICE VISIT (OUTPATIENT)
Dept: OBGYN CLINIC | Facility: HOSPITAL | Age: 79
End: 2019-03-11
Payer: MEDICARE

## 2019-03-11 ENCOUNTER — HOSPITAL ENCOUNTER (OUTPATIENT)
Dept: RADIOLOGY | Facility: HOSPITAL | Age: 79
Discharge: HOME | End: 2019-03-11
Payer: MEDICARE

## 2019-03-11 VITALS
WEIGHT: 195 LBS | BODY MASS INDEX: 29.55 KG/M2 | SYSTOLIC BLOOD PRESSURE: 170 MMHG | HEIGHT: 68 IN | DIASTOLIC BLOOD PRESSURE: 84 MMHG | HEART RATE: 59 BPM

## 2019-03-11 DIAGNOSIS — M25.571 PAIN, JOINT, ANKLE AND FOOT, RIGHT: ICD-10-CM

## 2019-03-11 DIAGNOSIS — M25.561 RIGHT KNEE PAIN, UNSPECIFIED CHRONICITY: ICD-10-CM

## 2019-03-11 DIAGNOSIS — S82.831A OTHER CLOSED FRACTURE OF PROXIMAL END OF RIGHT FIBULA, INITIAL ENCOUNTER: ICD-10-CM

## 2019-03-11 DIAGNOSIS — M25.561 RIGHT KNEE PAIN, UNSPECIFIED CHRONICITY: Primary | ICD-10-CM

## 2019-03-11 PROCEDURE — 73560 X-RAY EXAM OF KNEE 1 OR 2: CPT

## 2019-03-11 PROCEDURE — 73610 X-RAY EXAM OF ANKLE: CPT

## 2019-03-11 PROCEDURE — 99203 OFFICE O/P NEW LOW 30 MIN: CPT | Performed by: PHYSICIAN ASSISTANT

## 2019-03-11 NOTE — PROGRESS NOTES
Assessment/Plan   Diagnoses and all orders for this visit:    Right knee pain, unspecified chronicity  -     Cancel: XR knee 3 vw right non injury; Future    Oblique closed fracture of proximal shaft of right fibula, initial encounter  -     No evidence of Maisonneuve injury  -     Continue high CAM boot  -     Follow up in 2 weeks with sports medicine  -     Continue taking your daily baby aspirin          Subjective   Patient ID: Yolanda Carvajal is a 66 y o  male  Vitals:    03/11/19 1353   BP: 170/84   Pulse: 61     79yo male comes in for an evaluation of his right leg  He was injured on 3/7/19 when he slipped and fell on the ice  He struck his leg on the ground  He has been having pain in the proximal, lateral, lower leg  He went to an urgent care where xrays showed a fibular fracture  He was treated with a high CAM boot  The pain is dull in character, mild in severity, pain does not radiate and is not associated with numbness  He denies ankle pain           The following portions of the patient's history were reviewed and updated as appropriate: allergies, current medications, past family history, past medical history, past social history, past surgical history and problem list     Review of Systems  Ortho Exam  Past Medical History:   Diagnosis Date    Acute kidney injury ([address] )     Arthritis     Hands    Wright esophagus     BPH with obstruction/lower urinary tract symptoms     CAD (coronary artery disease)     Last assessed 09/16/15    Cataract, acquired     Last assessed 10/10/17    Diabetes mellitus ([facility] )     NIDDM    Diabetic neuropathy ([facility] )     Feet    Enlarged prostate with lower urinary tract symptoms (LUTS)     Last assessed 10/10/17    Erectile dysfunction     GERD (gastroesophageal reflux disease)     Last assessed 10/10/17    Hemoptysis     Last assessed 03/14/16    Hypercholesterolemia     Last assessed 10/10/17    Hypertension     Last assessed 10/10/17    Macular degeneration     Right eye is particularly affected    Myocardial infarction [facility] )     OAB (overactive bladder)     Testicular hypofunction     Testicular hypogonadism     Last assessed     Umbilical hernia     Last assessed 14    Urge incontinence of urine     Wears glasses      Past Surgical History:   Procedure Laterality Date    COLONOSCOPY      CORONARY ARTERY BYPASS GRAFT  2014    ABG x 4 LIMA to LAD,SVG to diagnoal 2 SVG to OM-1, SVG to PDA, resection of partial plerual mass    CYSTOSCOPY  2013    ESOPHAGOGASTRODUODENOSCOPY      FL RETROGRADE PYELOGRAM  2018    INGUINAL HERNIA REPAIR Bilateral 2015    PHOTODYNAMIC THERAPY      For Barretts esophagus    CT COLONOSCOPY FLX DX W/COLLJ SPEC WHEN PFRMD N/A 2016    Procedure: COLONOSCOPY;  Surgeon: José Miguel Casarez MD;  Location: [facility]; Service: Gastroenterology    TONSILLECTOMY      TRANSURETHRAL RESECTION OF PROSTATE N/A 2018    Procedure: CYSTO, PHOTO SELECTIVE VAPORIZATION OF PROSTATE, B/L RETROGRADE PYELOGRAM, TURBT;  Surgeon: Sonu Bello MD;  Location: [facility];  Service: Urology    UMBILICAL HERNIA REPAIR       Family History   Problem Relation Age of Onset    Cancer Mother     Other Mother         Digestive System Complications    Diabetes Father     Heart disease Father     Hypertension Father     Coronary artery disease Father     Hyperlipidemia Father      Social History     Occupational History    Occupation: Sales position   Tobacco Use    Smoking status: Former Smoker     Packs/day: 1 00     Years: 13 00     Pack years: 13      Types: Cigarettes     Last attempt to quit: 1972     Years since quittin 2    Smokeless tobacco: Never Used   Substance and Sexual Activity    Alcohol use: Yes     Comment: 1 drink daily- a mixed drink or wine    Drug use: No    Sexual activity: Not on file       Review of Systems   Constitutional: Negative  HENT: Negative      Eyes: Negative  Respiratory: Negative  Cardiovascular: Negative  Gastrointestinal: Negative  Endocrine: Negative  Genitourinary: Negative  Musculoskeletal: As below      Allergic/Immunologic: Negative  Neurological: Negative  Hematological: Negative  Psychiatric/Behavioral: Negative  Objective   Physical Exam        I have personally reviewed pertinent films in PACS and my interpretation is ND proximal fibular fracture  No ankle fracture  No mortise widening         · Constitutional: Awake, Alert, Oriented  · Eyes: EOMI  · Psych: Mood and affect appropriate  · Heart: regular rate and rhythm  · Lungs: No audible wheezing  · Abdomen: soft  · Lymph: no lymphedema             Right knee / ankle:  - Appearance   No swelling, discoloration, deformity, or ecchymosis  - Palpation   No tenderness about the medial / lateral malleoli, deltoid, atf, cf, ptf, talus, achilles or 5th MT and ++ tenderness of the proximal fibular shaft  - ROM   Full, pain-free, active ROM in the knee and ankle  - Special Tests   Normal De Jesus test  - Motor   normal 5/5 in all planes  - NVI distally

## 2019-03-26 ENCOUNTER — OFFICE VISIT (OUTPATIENT)
Dept: OBGYN CLINIC | Facility: OTHER | Age: 79
End: 2019-03-26
Payer: MEDICARE

## 2019-03-26 VITALS
HEART RATE: 60 BPM | BODY MASS INDEX: 29.55 KG/M2 | DIASTOLIC BLOOD PRESSURE: 93 MMHG | WEIGHT: 195 LBS | SYSTOLIC BLOOD PRESSURE: 175 MMHG | HEIGHT: 68 IN

## 2019-03-26 DIAGNOSIS — S82.831D OTHER CLOSED FRACTURE OF PROXIMAL END OF RIGHT FIBULA WITH ROUTINE HEALING, SUBSEQUENT ENCOUNTER: Primary | ICD-10-CM

## 2019-03-26 PROCEDURE — 99213 OFFICE O/P EST LOW 20 MIN: CPT | Performed by: ORTHOPAEDIC SURGERY

## 2019-03-26 NOTE — PROGRESS NOTES
Assessment/Plan:    Diagnoses and all orders for this visit:    Other closed fracture of proximal end of right fibula with routine healing, subsequent encounter      · X-rays of the right knee was reviewed and showed proximal fibular fracture that is non-displaced  · At this time, Christina Martínez has no pain with weight-bearing and no related neurovascular deficits   · As a result, he may discontinue CAM boot gradually to full weight bearing  · He may use a tripod cane, if needed  · He can follow up in 5 weeks for repeat x-rays  · Christina Martínez acknowledged understanding and agreement with the plan    Chief Complaint:  Right leg pain    Subjective:     HPI    Christina Martínez is a 66year old male that comes to the office for evaluation of right lower leg pain  He was seen at the orthopaedic office on 3/11/19 due to an injury he experienced on 3/7/19 when he had slipped on snow and uneven ground  His lower leg made impact with the ground and was having pain in the proximal region  He had x-rays obtained at the appointment which showed a proximal fibular fracture  He was placed in a CAM boot and requested to follow up with us today  He states that he has been using the CAM boot intermittently and has no significant pain concerns with or without weight-bearing status  He is also not taking any OTC pain medications  He denies numbness, tingling or weakness or his affected extremity  He has been using a walker since the injury only for support but reports that he doesn't need it  Review of Systems   Constitutional: Negative for chills and fever  HENT: Negative for congestion, rhinorrhea and sore throat  Eyes: Negative for visual disturbance  Respiratory: Negative for shortness of breath  Cardiovascular: Negative for chest pain  Gastrointestinal: Negative for abdominal pain  Musculoskeletal: Positive for arthralgias (As per HPI)  Skin: Negative for rash           Objective:    Vitals:    03/26/19 1016   BP: (!) 175/93 Pulse: 60       Physical Exam   Constitutional: He is oriented to person, place, and time  He appears well-developed and well-nourished  No distress  HENT:   Head: Normocephalic and atraumatic  Right Ear: External ear normal    Left Ear: External ear normal    Nose: Nose normal    Eyes: EOM are normal  No scleral icterus  Neck: Neck supple  Pulmonary/Chest: Effort normal  No respiratory distress  Abdominal: Soft  Neurological: He is alert and oriented to person, place, and time  Skin: Skin is warm  He is not diaphoretic  Psychiatric: He has a normal mood and affect  Right Ankle Exam   Right ankle exam is normal     Range of Motion   The patient has normal right ankle ROM  Muscle Strength   The patient has normal right ankle strength  Other   Sensation: normal  Pulse: present       Right Knee Exam     Muscle Strength   The patient has normal right knee strength  Range of Motion   The patient has normal right knee ROM  Comments:  + tenderness of proximal 1/3rd of fibula  Negative Xander's sign bilaterally  5/5 EHL strength bilaterally  Sensation intact to touch in 1st toe web space  Normal gait with full weight-bearing without pain            I have personally reviewed pertinent films in PACS  XR of right knee form 3/11/19 shows acute mildly comminuted, non-displaced fibular shaft fracture and mild tricompartmental arthritis  XR of right ankle from 3/11/19 shows no acute osseous abnormality

## 2019-04-02 ENCOUNTER — APPOINTMENT (OUTPATIENT)
Dept: LAB | Facility: CLINIC | Age: 79
End: 2019-04-02
Payer: MEDICARE

## 2019-04-02 DIAGNOSIS — Z95.1 HX OF CABG: ICD-10-CM

## 2019-04-02 DIAGNOSIS — E78.2 MIXED HYPERLIPIDEMIA: ICD-10-CM

## 2019-04-02 DIAGNOSIS — N28.9 RENAL INSUFFICIENCY: ICD-10-CM

## 2019-04-02 DIAGNOSIS — E11.59 TYPE 2 DIABETES MELLITUS WITH OTHER CIRCULATORY COMPLICATION, WITHOUT LONG-TERM CURRENT USE OF INSULIN (HCC): ICD-10-CM

## 2019-04-02 DIAGNOSIS — I10 BENIGN ESSENTIAL HYPERTENSION: ICD-10-CM

## 2019-04-02 LAB
ALBUMIN SERPL BCP-MCNC: 3.8 G/DL (ref 3.5–5)
ALP SERPL-CCNC: 79 U/L (ref 46–116)
ALT SERPL W P-5'-P-CCNC: 22 U/L (ref 12–78)
ANION GAP SERPL CALCULATED.3IONS-SCNC: 3 MMOL/L (ref 4–13)
AST SERPL W P-5'-P-CCNC: 14 U/L (ref 5–45)
BACTERIA UR QL AUTO: ABNORMAL /HPF
BASOPHILS # BLD AUTO: 0.06 THOUSANDS/ΜL (ref 0–0.1)
BASOPHILS NFR BLD AUTO: 1 % (ref 0–1)
BILIRUB SERPL-MCNC: 0.64 MG/DL (ref 0.2–1)
BILIRUB UR QL STRIP: NEGATIVE
BUN SERPL-MCNC: 24 MG/DL (ref 5–25)
CALCIUM SERPL-MCNC: 9.1 MG/DL (ref 8.3–10.1)
CHLORIDE SERPL-SCNC: 106 MMOL/L (ref 100–108)
CHOLEST SERPL-MCNC: 150 MG/DL (ref 50–200)
CLARITY UR: CLEAR
CO2 SERPL-SCNC: 28 MMOL/L (ref 21–32)
COLOR UR: YELLOW
CREAT SERPL-MCNC: 1.24 MG/DL (ref 0.6–1.3)
CREAT UR-MCNC: 166 MG/DL
EOSINOPHIL # BLD AUTO: 0.27 THOUSAND/ΜL (ref 0–0.61)
EOSINOPHIL NFR BLD AUTO: 2 % (ref 0–6)
ERYTHROCYTE [DISTWIDTH] IN BLOOD BY AUTOMATED COUNT: 14.1 % (ref 11.6–15.1)
EST. AVERAGE GLUCOSE BLD GHB EST-MCNC: 126 MG/DL
GFR SERPL CREATININE-BSD FRML MDRD: 55 ML/MIN/1.73SQ M
GLUCOSE P FAST SERPL-MCNC: 135 MG/DL (ref 65–99)
GLUCOSE UR STRIP-MCNC: NEGATIVE MG/DL
HBA1C MFR BLD: 6 % (ref 4.2–6.3)
HCT VFR BLD AUTO: 44.5 % (ref 36.5–49.3)
HDLC SERPL-MCNC: 52 MG/DL (ref 40–60)
HGB BLD-MCNC: 14.7 G/DL (ref 12–17)
HGB UR QL STRIP.AUTO: NEGATIVE
HYALINE CASTS #/AREA URNS LPF: ABNORMAL /LPF
IMM GRANULOCYTES # BLD AUTO: 0.05 THOUSAND/UL (ref 0–0.2)
IMM GRANULOCYTES NFR BLD AUTO: 0 % (ref 0–2)
KETONES UR STRIP-MCNC: NEGATIVE MG/DL
LDLC SERPL CALC-MCNC: 81 MG/DL (ref 0–100)
LEUKOCYTE ESTERASE UR QL STRIP: ABNORMAL
LYMPHOCYTES # BLD AUTO: 1.74 THOUSANDS/ΜL (ref 0.6–4.47)
LYMPHOCYTES NFR BLD AUTO: 16 % (ref 14–44)
MCH RBC QN AUTO: 30.9 PG (ref 26.8–34.3)
MCHC RBC AUTO-ENTMCNC: 33 G/DL (ref 31.4–37.4)
MCV RBC AUTO: 94 FL (ref 82–98)
MICROALBUMIN UR-MCNC: 309 MG/L (ref 0–20)
MICROALBUMIN/CREAT 24H UR: 186 MG/G CREATININE (ref 0–30)
MONOCYTES # BLD AUTO: 1.03 THOUSAND/ΜL (ref 0.17–1.22)
MONOCYTES NFR BLD AUTO: 9 % (ref 4–12)
NEUTROPHILS # BLD AUTO: 8.07 THOUSANDS/ΜL (ref 1.85–7.62)
NEUTS SEG NFR BLD AUTO: 72 % (ref 43–75)
NITRITE UR QL STRIP: NEGATIVE
NON-SQ EPI CELLS URNS QL MICRO: ABNORMAL /HPF
NONHDLC SERPL-MCNC: 98 MG/DL
NRBC BLD AUTO-RTO: 0 /100 WBCS
PH UR STRIP.AUTO: 6 [PH]
PLATELET # BLD AUTO: 176 THOUSANDS/UL (ref 149–390)
PMV BLD AUTO: 12.5 FL (ref 8.9–12.7)
POTASSIUM SERPL-SCNC: 4 MMOL/L (ref 3.5–5.3)
PROT SERPL-MCNC: 7.4 G/DL (ref 6.4–8.2)
PROT UR STRIP-MCNC: ABNORMAL MG/DL
RBC # BLD AUTO: 4.75 MILLION/UL (ref 3.88–5.62)
RBC #/AREA URNS AUTO: ABNORMAL /HPF
SODIUM SERPL-SCNC: 137 MMOL/L (ref 136–145)
SP GR UR STRIP.AUTO: 1.02 (ref 1–1.03)
TRIGL SERPL-MCNC: 87 MG/DL
UROBILINOGEN UR QL STRIP.AUTO: 0.2 E.U./DL
WBC # BLD AUTO: 11.22 THOUSAND/UL (ref 4.31–10.16)
WBC #/AREA URNS AUTO: ABNORMAL /HPF

## 2019-04-02 PROCEDURE — 80053 COMPREHEN METABOLIC PANEL: CPT

## 2019-04-02 PROCEDURE — 82570 ASSAY OF URINE CREATININE: CPT | Performed by: INTERNAL MEDICINE

## 2019-04-02 PROCEDURE — 81001 URINALYSIS AUTO W/SCOPE: CPT

## 2019-04-02 PROCEDURE — 83036 HEMOGLOBIN GLYCOSYLATED A1C: CPT

## 2019-04-02 PROCEDURE — 80061 LIPID PANEL: CPT

## 2019-04-02 PROCEDURE — 85025 COMPLETE CBC W/AUTO DIFF WBC: CPT

## 2019-04-02 PROCEDURE — 36415 COLL VENOUS BLD VENIPUNCTURE: CPT

## 2019-04-02 PROCEDURE — 82043 UR ALBUMIN QUANTITATIVE: CPT | Performed by: INTERNAL MEDICINE

## 2019-04-09 LAB
LEFT EYE DIABETIC RETINOPATHY: NORMAL
RIGHT EYE DIABETIC RETINOPATHY: NORMAL

## 2019-04-16 ENCOUNTER — PROCEDURE VISIT (OUTPATIENT)
Dept: UROLOGY | Facility: MEDICAL CENTER | Age: 79
End: 2019-04-16
Payer: MEDICARE

## 2019-04-16 VITALS
WEIGHT: 195 LBS | BODY MASS INDEX: 29.55 KG/M2 | SYSTOLIC BLOOD PRESSURE: 150 MMHG | HEIGHT: 68 IN | DIASTOLIC BLOOD PRESSURE: 90 MMHG

## 2019-04-16 DIAGNOSIS — Z85.51 HISTORY OF BLADDER CANCER: Primary | ICD-10-CM

## 2019-04-16 PROCEDURE — 52000 CYSTOURETHROSCOPY: CPT | Performed by: UROLOGY

## 2019-04-25 DIAGNOSIS — I10 ESSENTIAL HYPERTENSION: Primary | ICD-10-CM

## 2019-04-25 RX ORDER — IRBESARTAN 300 MG/1
300 TABLET ORAL EVERY MORNING
Qty: 90 TABLET | Refills: 3 | Status: SHIPPED | OUTPATIENT
Start: 2019-04-25 | End: 2019-12-06 | Stop reason: SDUPTHER

## 2019-04-30 ENCOUNTER — APPOINTMENT (OUTPATIENT)
Dept: RADIOLOGY | Facility: OTHER | Age: 79
End: 2019-04-30
Payer: MEDICARE

## 2019-04-30 ENCOUNTER — OFFICE VISIT (OUTPATIENT)
Dept: OBGYN CLINIC | Facility: OTHER | Age: 79
End: 2019-04-30
Payer: MEDICARE

## 2019-04-30 VITALS
HEIGHT: 68 IN | BODY MASS INDEX: 29.65 KG/M2 | SYSTOLIC BLOOD PRESSURE: 187 MMHG | DIASTOLIC BLOOD PRESSURE: 73 MMHG | HEART RATE: 60 BPM

## 2019-04-30 DIAGNOSIS — I10 ESSENTIAL HYPERTENSION, BENIGN: Primary | ICD-10-CM

## 2019-04-30 DIAGNOSIS — S82.831D OTHER CLOSED FRACTURE OF PROXIMAL END OF RIGHT FIBULA WITH ROUTINE HEALING, SUBSEQUENT ENCOUNTER: ICD-10-CM

## 2019-04-30 DIAGNOSIS — S82.831D OTHER CLOSED FRACTURE OF PROXIMAL END OF RIGHT FIBULA WITH ROUTINE HEALING, SUBSEQUENT ENCOUNTER: Primary | ICD-10-CM

## 2019-04-30 PROCEDURE — 99213 OFFICE O/P EST LOW 20 MIN: CPT | Performed by: ORTHOPAEDIC SURGERY

## 2019-04-30 PROCEDURE — 73560 X-RAY EXAM OF KNEE 1 OR 2: CPT

## 2019-05-01 RX ORDER — METOPROLOL TARTRATE 50 MG/1
50 TABLET, FILM COATED ORAL 2 TIMES DAILY
Qty: 180 TABLET | Refills: 3 | Status: SHIPPED | OUTPATIENT
Start: 2019-05-01 | End: 2020-06-28

## 2019-05-23 ENCOUNTER — OFFICE VISIT (OUTPATIENT)
Dept: INTERNAL MEDICINE CLINIC | Facility: CLINIC | Age: 79
End: 2019-05-23
Payer: MEDICARE

## 2019-05-23 VITALS
TEMPERATURE: 98.2 F | WEIGHT: 200 LBS | DIASTOLIC BLOOD PRESSURE: 80 MMHG | SYSTOLIC BLOOD PRESSURE: 130 MMHG | HEIGHT: 68 IN | OXYGEN SATURATION: 98 % | BODY MASS INDEX: 30.31 KG/M2 | HEART RATE: 50 BPM

## 2019-05-23 DIAGNOSIS — I10 BENIGN ESSENTIAL HYPERTENSION: ICD-10-CM

## 2019-05-23 DIAGNOSIS — D49.4 BLADDER TUMOR: ICD-10-CM

## 2019-05-23 DIAGNOSIS — K22.70 BARRETT'S ESOPHAGUS WITH ESOPHAGITIS: ICD-10-CM

## 2019-05-23 DIAGNOSIS — Z23 ENCOUNTER FOR IMMUNIZATION: ICD-10-CM

## 2019-05-23 DIAGNOSIS — E11.59 TYPE 2 DIABETES MELLITUS WITH OTHER CIRCULATORY COMPLICATION, WITHOUT LONG-TERM CURRENT USE OF INSULIN (HCC): ICD-10-CM

## 2019-05-23 DIAGNOSIS — C67.2 MALIGNANT NEOPLASM OF LATERAL WALL OF URINARY BLADDER (HCC): ICD-10-CM

## 2019-05-23 DIAGNOSIS — S82.831D OTHER CLOSED FRACTURE OF PROXIMAL END OF RIGHT FIBULA WITH ROUTINE HEALING, SUBSEQUENT ENCOUNTER: Primary | ICD-10-CM

## 2019-05-23 DIAGNOSIS — I25.2 CORONARY ARTERY DISEASE WITH HISTORY OF MYOCARDIAL INFARCTION WITHOUT HISTORY OF CABG: ICD-10-CM

## 2019-05-23 DIAGNOSIS — K20.90 BARRETT'S ESOPHAGUS WITH ESOPHAGITIS: ICD-10-CM

## 2019-05-23 DIAGNOSIS — Z00.00 MEDICARE ANNUAL WELLNESS VISIT, SUBSEQUENT: ICD-10-CM

## 2019-05-23 DIAGNOSIS — I25.10 CORONARY ARTERY DISEASE WITH HISTORY OF MYOCARDIAL INFARCTION WITHOUT HISTORY OF CABG: ICD-10-CM

## 2019-05-23 PROCEDURE — 90670 PCV13 VACCINE IM: CPT | Performed by: INTERNAL MEDICINE

## 2019-05-23 PROCEDURE — G0009 ADMIN PNEUMOCOCCAL VACCINE: HCPCS | Performed by: INTERNAL MEDICINE

## 2019-05-23 PROCEDURE — G0439 PPPS, SUBSEQ VISIT: HCPCS | Performed by: INTERNAL MEDICINE

## 2019-05-23 PROCEDURE — 99214 OFFICE O/P EST MOD 30 MIN: CPT | Performed by: INTERNAL MEDICINE

## 2019-06-05 ENCOUNTER — OFFICE VISIT (OUTPATIENT)
Dept: DERMATOLOGY | Facility: CLINIC | Age: 79
End: 2019-06-05
Payer: MEDICARE

## 2019-06-05 VITALS — WEIGHT: 195 LBS | HEIGHT: 68 IN | BODY MASS INDEX: 29.55 KG/M2 | TEMPERATURE: 98.7 F

## 2019-06-05 DIAGNOSIS — D22.9 MULTIPLE BENIGN MELANOCYTIC NEVI: ICD-10-CM

## 2019-06-05 DIAGNOSIS — D48.5 NEOPLASM OF UNCERTAIN BEHAVIOR OF SKIN: Primary | ICD-10-CM

## 2019-06-05 DIAGNOSIS — L82.1 SEBORRHEIC KERATOSIS: ICD-10-CM

## 2019-06-05 LAB
LEFT EYE DIABETIC RETINOPATHY: NORMAL
RIGHT EYE DIABETIC RETINOPATHY: NORMAL

## 2019-06-05 PROCEDURE — 11102 TANGNTL BX SKIN SINGLE LES: CPT | Performed by: DERMATOLOGY

## 2019-06-05 PROCEDURE — 88305 TISSUE EXAM BY PATHOLOGIST: CPT | Performed by: PATHOLOGY

## 2019-06-05 PROCEDURE — 99204 OFFICE O/P NEW MOD 45 MIN: CPT | Performed by: DERMATOLOGY

## 2019-07-02 ENCOUNTER — HOSPITAL ENCOUNTER (OUTPATIENT)
Dept: NON INVASIVE DIAGNOSTICS | Facility: CLINIC | Age: 79
Discharge: HOME | End: 2019-07-02
Payer: MEDICARE

## 2019-07-02 DIAGNOSIS — I25.10 CORONARY ARTERY DISEASE INVOLVING NATIVE CORONARY ARTERY OF NATIVE HEART WITHOUT ANGINA PECTORIS: ICD-10-CM

## 2019-07-02 PROCEDURE — 93306 TTE W/DOPPLER COMPLETE: CPT

## 2019-07-02 PROCEDURE — 93017 CV STRESS TEST TRACING ONLY: CPT

## 2019-07-02 PROCEDURE — 93018 CV STRESS TEST I&R ONLY: CPT | Performed by: INTERNAL MEDICINE

## 2019-07-02 PROCEDURE — 93016 CV STRESS TEST SUPVJ ONLY: CPT | Performed by: INTERNAL MEDICINE

## 2019-07-02 PROCEDURE — 93306 TTE W/DOPPLER COMPLETE: CPT | Performed by: INTERNAL MEDICINE

## 2019-07-03 LAB
CHEST PAIN STATEMENT: NORMAL
MAX DIASTOLIC BP: 90 MMHG
MAX HEART RATE: 134 BPM
MAX PREDICTED HEART RATE: 142 BPM
MAX. SYSTOLIC BP: 202 MMHG
PROTOCOL NAME: NORMAL
TARGET HR FORMULA: NORMAL
TEST INDICATION: NORMAL
TIME IN EXERCISE PHASE: NORMAL

## 2019-07-30 ENCOUNTER — PROCEDURE VISIT (OUTPATIENT)
Dept: UROLOGY | Facility: MEDICAL CENTER | Age: 79
End: 2019-07-30
Payer: MEDICARE

## 2019-07-30 VITALS
BODY MASS INDEX: 29.83 KG/M2 | HEART RATE: 58 BPM | SYSTOLIC BLOOD PRESSURE: 134 MMHG | WEIGHT: 196.8 LBS | HEIGHT: 68 IN | DIASTOLIC BLOOD PRESSURE: 72 MMHG

## 2019-07-30 DIAGNOSIS — C67.2 MALIGNANT NEOPLASM OF LATERAL WALL OF URINARY BLADDER (HCC): Primary | ICD-10-CM

## 2019-07-30 DIAGNOSIS — Z85.51 HISTORY OF BLADDER CANCER: Primary | ICD-10-CM

## 2019-07-30 LAB
SL AMB  POCT GLUCOSE, UA: NORMAL
SL AMB LEUKOCYTE ESTERASE,UA: NORMAL
SL AMB POCT BILIRUBIN,UA: NORMAL
SL AMB POCT BLOOD,UA: NORMAL
SL AMB POCT CLARITY,UA: CLEAR
SL AMB POCT COLOR,UA: YELLOW
SL AMB POCT KETONES,UA: NORMAL
SL AMB POCT NITRITE,UA: NORMAL
SL AMB POCT PH,UA: 5.5
SL AMB POCT SPECIFIC GRAVITY,UA: <=1.005
SL AMB POCT URINE PROTEIN: NORMAL
SL AMB POCT UROBILINOGEN: 0.2

## 2019-07-30 PROCEDURE — 52000 CYSTOURETHROSCOPY: CPT | Performed by: UROLOGY

## 2019-07-30 PROCEDURE — 81003 URINALYSIS AUTO W/O SCOPE: CPT | Performed by: UROLOGY

## 2019-07-30 NOTE — PROGRESS NOTES
Procedures     History of bladder cancer: On December 20, 2018 the patient underwent a GreenLight laser transurethral resection of his prostate and resection of a 1 cm papillary tumor on the right lateral wall  That tumor rule was high grade  The patient returns today for surveillance cystoscopy and cytology  MALE FLEXIBLE CYSTOSCOPY    Preoperative diagnosis:  History of bladder cancer    Postoperative diagnosis:  No visible recurrent tumor    Operation:  Flexible cystoscopy    Surgeon:  Fuad Valadez MD,FACS    Anesthesia:  Xylocaine jelly    Procedure:  Patient was brought to the cystoscopy room and positively identified  The patient was prepped and draped in sterile fashion  Ten mL of 1% xylocaine jelly was instilled into the urethra  A penile clamp was applied  The flexible cystoscope was inserted  Findings are as follows:    Penile Urethra:  normal  Bulbar Urethra:  normal  Prostate:  Status post GreenLight laser TURP  There is a small amount of residual apical tissue and some necrotic debris clinging to the right side of the apical prostatic fossa  Otherwise, the prostate is widely patent  Bladder:   Bladder Neck:  Normal  Ureteral orifices:   Normal  Mucosa:   Normal   The previous resection site has healed completely  Trabeculation:  mild   PVR:  Minimal      The cystoscope was removed  The patient tolerated the procedure well  Following additional consultation to review the findings with the patient, he was discharged from the office in satisfactory condition  Impression:  No visible recurrent tumor  Cytology sent

## 2019-07-30 NOTE — LETTER
July 30, 2019     Steph Bradshaw DO  [address]  [address] [place] [id number]    Patient: Mercedes Alas   YOB: 1940   Date of Visit: 7/30/2019       Dear Dr Levon Loaiza: Thank you for referring Escobar Muir to me for evaluation  Below are my notes for this consultation  If you have questions, please do not hesitate to call me  I look forward to following your patient along with you  Sincerely,        Prince Lee MD        CC: No Recipients  Prince Lee MD  7/30/2019 11:11 AM  Incomplete  Procedures     History of bladder cancer: On December 20, 2018 the patient underwent a GreenLight laser transurethral resection of his prostate and resection of a 1 cm papillary tumor on the right lateral wall  That tumor rule was high grade  The patient returns today for surveillance cystoscopy and cytology  MALE FLEXIBLE CYSTOSCOPY    Preoperative diagnosis:  History of bladder cancer    Postoperative diagnosis:  No visible recurrent tumor    Operation:  Flexible cystoscopy    Surgeon:  Nahid Coleman MD,FACS    Anesthesia:  Xylocaine jelly    Procedure:  Patient was brought to the cystoscopy room and positively identified  The patient was prepped and draped in sterile fashion  Ten mL of 1% xylocaine jelly was instilled into the urethra  A penile clamp was applied  The flexible cystoscope was inserted  Findings are as follows:    Penile Urethra:  normal  Bulbar Urethra:  normal  Prostate:  Status post GreenLight laser TURP  There is a small amount of residual apical tissue and some necrotic debris clinging to the right side of the apical prostatic fossa  Otherwise, the prostate is widely patent  Bladder:   Bladder Neck:  Normal  Ureteral orifices:   Normal  Mucosa:   Normal   The previous resection site has healed completely  Trabeculation:  mild   PVR:  Minimal      The cystoscope was removed  The patient tolerated the procedure well    Following additional consultation to review the findings with the patient, he was discharged from the office in satisfactory condition  Impression:  No visible recurrent tumor  Cytology sent

## 2019-07-30 NOTE — ASSESSMENT & PLAN NOTE
No evidence of recurrent neoplasm on cystoscopy today  Cytology sent  Surveillance cystoscopy in 3 months

## 2019-08-03 ENCOUNTER — APPOINTMENT (OUTPATIENT)
Dept: LAB | Facility: CLINIC | Age: 79
End: 2019-08-03
Payer: MEDICARE

## 2019-08-03 PROCEDURE — 88112 CYTOPATH CELL ENHANCE TECH: CPT | Performed by: PATHOLOGY

## 2019-08-13 ENCOUNTER — APPOINTMENT (OUTPATIENT)
Dept: RADIOLOGY | Facility: CLINIC | Age: 79
End: 2019-08-13
Payer: MEDICARE

## 2019-08-13 ENCOUNTER — TRANSCRIBE ORDERS (OUTPATIENT)
Dept: RADIOLOGY | Facility: CLINIC | Age: 79
End: 2019-08-13

## 2019-08-13 DIAGNOSIS — S93.401A SPRAIN OF RIGHT ANKLE, UNSPECIFIED LIGAMENT, INITIAL ENCOUNTER: ICD-10-CM

## 2019-08-13 DIAGNOSIS — S93.401A SPRAIN OF RIGHT ANKLE, UNSPECIFIED LIGAMENT, INITIAL ENCOUNTER: Primary | ICD-10-CM

## 2019-08-13 PROCEDURE — 73610 X-RAY EXAM OF ANKLE: CPT

## 2019-08-19 ENCOUNTER — EVALUATION (OUTPATIENT)
Dept: PHYSICAL THERAPY | Facility: REHABILITATION | Age: 79
End: 2019-08-19
Payer: MEDICARE

## 2019-08-19 DIAGNOSIS — S93.401S SPRAIN OF RIGHT ANKLE, UNSPECIFIED LIGAMENT, SEQUELA: Primary | ICD-10-CM

## 2019-08-19 PROCEDURE — 97140 MANUAL THERAPY 1/> REGIONS: CPT | Performed by: PHYSICAL THERAPIST

## 2019-08-19 PROCEDURE — 97161 PT EVAL LOW COMPLEX 20 MIN: CPT | Performed by: PHYSICAL THERAPIST

## 2019-08-19 NOTE — PROGRESS NOTES
PT Evaluation     Today's date: 2019  Patient name: Teresa Lord  : 1940  MRN: [id number]  Referring provider: Bailee Basilio DPM  Dx:   Encounter Diagnosis     ICD-10-CM    1  Sprain of right ankle, unspecified ligament, sequela S93 401S                   Assessment  Assessment details: Teresa Lord is a 78 y o  male with significant medical history of DM2 and chronic ankle instability who presents with chief complaint of R ankle pain that began after a proximal fibular fracture  Brandy Vital presents with evaluation findings of decreased fibular, talocrural and subtalar mobility, decreased ankle strength and decreased functional dorsiflexion ROM  These deficits are manifested functionally in difficulty with prolonged ambulation and navigating stairs  Brandy Vital will benefit from physical therapy to improve ankle ROM and strength, decrease pain and allow for return to prolonged walking with confidence  Impairments: abnormal or restricted ROM, impaired physical strength, lacks appropriate home exercise program and pain with function    Symptom irritability: lowUnderstanding of Dx/Px/POC: good   Prognosis: good    Goals  Short Term  1: Patient will report a 50% decrease in pain in 2-4 weeks  2: Pt will have functional dorsiflexion of 10 degrees in 4 weeks  Long Term  1: Patient will demonstrate independence in home exercise program by discharge  2: Patient will have FOTO score of 64 indicating improved function in 8 weeks  Plan  Patient would benefit from: skilled physical therapy  Planned therapy interventions: joint mobilization, manual therapy, neuromuscular re-education, patient education, strengthening, stretching, balance and home exercise program  Frequency: 2x week  Duration in weeks: 12  Treatment plan discussed with: patient        Subjective Evaluation    History of Present Illness  Mechanism of injury: Pt reports that years ago he developed what he called a "trick ankle"   States that at the end of march he feel and broke his proximal fibula  States he feels like his ankle may have given out at that time as well  States that since that time he is having pain when he goes and up down stairs and he feels like there is a pinching in the anterior lateral aspect of his ankle  Pt denies other aggravating activities  Denies recent ankle sprains  Pt has type 2 DM and does report intermittent numbness/tingling which he attributes to this  Pt goals for therapy include walking with confidence  Pt does report that he struggles with balance  Pain  Current pain ratin  At best pain ratin  At worst pain ratin          Objective     Tenderness     Right Ankle/Foot   Tenderness in the anterior ankle, fibula and talar dome  No tenderness in the Achilles insertion, medial calcaneus, medial malleolus and peroneal tendon  Passive Range of Motion   Left Ankle/Foot    Dorsiflexion (ke): 8 degrees   Dorsiflexion (kf): 15 degrees   Plantar flexion: 40 degrees   Inversion: 30 degrees   Eversion: 30 degrees     Right Ankle/Foot    Dorsiflexion (ke): -3 degrees   Dorsiflexion (kf): 15 degrees    Plantar flexion: 40 degrees   Inversion: 50 degrees   Eversion: 30 degrees     Joint Play     Right Ankle/Foot  Joints within functional limits are the midfoot and forefoot  Hypomobile in the fibular head, talocrural joint and subtalar joint  Strength/Myotome Testing     Left Ankle/Foot   Dorsiflexion: 4  Plantar flexion: 4  Inversion: 4  Eversion: 4    Right Ankle/Foot   Dorsiflexion: 5  Plantar flexion: 4  Inversion: 4  Eversion: 4-    Tests     Right Ankle/Foot   Positive for anterior drawer  Negative for calcaneal squeeze and posterior drawer                Precautions: DM2, HTN, history of heart attack      Manual              TC distraction Kenny            TC posterior glide Kenny            Calcaneal mobilization KENNY                         Fibular tapping KENNY                Exercise Diary 8/19            Bike             gastroc stretch 3x30"            4 way ankle             Biodex             Standing on foam                                                                                                                                                                                                                    Modalities

## 2019-08-19 NOTE — LETTER
2019    Jenniferzoya Tania Tran ica [address] [place] [address]    Patient: Peter Mckeon   YOB: 1940   Date of Visit: 2019     Encounter Diagnosis     ICD-10-CM    1  Sprain of right ankle, unspecified ligament, sequela S93 401S        Dear Dr Eligio Chambersy: Thank you for your recent referral of Peter Mckeon  Please review the attached evaluation summary from Sidney's recent visit  Please verify that you agree with the plan of care by signing the attached order  If you have any questions or concerns, please do not hesitate to call  I sincerely appreciate the opportunity to share in the care of one of your patients and hope to have another opportunity to work with you in the near future  Sincerely,    Vickie Champion, PT      Referring Provider:      I certify that I have read the below Plan of Care and certify the need for these services furnished under this plan of treatment while under my care  Teetee Tran DPM  [facility]  VIA Facsimile: [phone]          PT Evaluation     Today's date: 2019  Patient name: Peter Mckeon  : 1940  MRN: [id number]  Referring provider: Joselin Iqbal DPM  Dx:   Encounter Diagnosis     ICD-10-CM    1  Sprain of right ankle, unspecified ligament, sequela S93 401S                   Assessment  Assessment details: Peter Mckeon is a 78 y o  male with significant medical history of DM2 and chronic ankle instability who presents with chief complaint of R ankle pain that began after a proximal fibular fracture  Beti Miranda presents with evaluation findings of decreased fibular, talocrural and subtalar mobility, decreased ankle strength and decreased functional dorsiflexion ROM  These deficits are manifested functionally in difficulty with prolonged ambulation and navigating stairs   Beti Miranda will benefit from physical therapy to improve ankle ROM and strength, decrease pain and allow for return to prolonged walking with confidence  Impairments: abnormal or restricted ROM, impaired physical strength, lacks appropriate home exercise program and pain with function    Symptom irritability: lowUnderstanding of Dx/Px/POC: good   Prognosis: good    Goals  Short Term  1: Patient will report a 50% decrease in pain in 2-4 weeks  2: Pt will have functional dorsiflexion of 10 degrees in 4 weeks  Long Term  1: Patient will demonstrate independence in home exercise program by discharge  2: Patient will have FOTO score of 64 indicating improved function in 8 weeks  Plan  Patient would benefit from: skilled physical therapy  Planned therapy interventions: joint mobilization, manual therapy, neuromuscular re-education, patient education, strengthening, stretching, balance and home exercise program  Frequency: 2x week  Duration in weeks: 12  Treatment plan discussed with: patient        Subjective Evaluation    History of Present Illness  Mechanism of injury: Pt reports that years ago he developed what he called a "trick ankle"  States that at the end of march he feel and broke his proximal fibula  States he feels like his ankle may have given out at that time as well  States that since that time he is having pain when he goes and up down stairs and he feels like there is a pinching in the anterior lateral aspect of his ankle  Pt denies other aggravating activities  Denies recent ankle sprains  Pt has type 2 DM and does report intermittent numbness/tingling which he attributes to this  Pt goals for therapy include walking with confidence  Pt does report that he struggles with balance  Pain  Current pain ratin  At best pain ratin  At worst pain ratin          Objective     Tenderness     Right Ankle/Foot   Tenderness in the anterior ankle, fibula and talar dome   No tenderness in the Achilles insertion, medial calcaneus, medial malleolus and peroneal tendon  Passive Range of Motion   Left Ankle/Foot    Dorsiflexion (ke): 8 degrees   Dorsiflexion (kf): 15 degrees   Plantar flexion: 40 degrees   Inversion: 30 degrees   Eversion: 30 degrees     Right Ankle/Foot    Dorsiflexion (ke): -3 degrees   Dorsiflexion (kf): 15 degrees    Plantar flexion: 40 degrees   Inversion: 50 degrees   Eversion: 30 degrees     Joint Play     Right Ankle/Foot  Joints within functional limits are the midfoot and forefoot  Hypomobile in the fibular head, talocrural joint and subtalar joint  Strength/Myotome Testing     Left Ankle/Foot   Dorsiflexion: 4  Plantar flexion: 4  Inversion: 4  Eversion: 4    Right Ankle/Foot   Dorsiflexion: 5  Plantar flexion: 4  Inversion: 4  Eversion: 4-    Tests     Right Ankle/Foot   Positive for anterior drawer  Negative for calcaneal squeeze and posterior drawer                Precautions: DM2, HTN, history of heart attack      Manual  8/19            TC distraction Kenny            TC posterior glide Kenny            Calcaneal mobilization KENNY                         Fibular tapping KENNY                Exercise Diary  8/19            Bike             gastroc stretch 3x30"            4 way ankle             Biodex             Standing on foam                                                                                                                                                                                                                    Modalities

## 2019-08-22 ENCOUNTER — OFFICE VISIT (OUTPATIENT)
Dept: PHYSICAL THERAPY | Facility: REHABILITATION | Age: 79
End: 2019-08-22
Payer: MEDICARE

## 2019-08-22 DIAGNOSIS — S93.401S SPRAIN OF RIGHT ANKLE, UNSPECIFIED LIGAMENT, SEQUELA: Primary | ICD-10-CM

## 2019-08-22 PROCEDURE — 97140 MANUAL THERAPY 1/> REGIONS: CPT | Performed by: PHYSICAL THERAPY ASSISTANT

## 2019-08-22 PROCEDURE — 97112 NEUROMUSCULAR REEDUCATION: CPT | Performed by: PHYSICAL THERAPY ASSISTANT

## 2019-08-22 PROCEDURE — 97110 THERAPEUTIC EXERCISES: CPT | Performed by: PHYSICAL THERAPY ASSISTANT

## 2019-08-22 NOTE — PROGRESS NOTES
Daily Note     Today's date: 2019  Patient name: Nehemiah Gutierrez  : 1940  MRN: [id number]  Referring provider: Suzanna Argueta DPM  Dx: No diagnosis found  Subjective: Pt reports taping done at  has helped to decrease pain  Objective: See treatment diary below      Assessment: Tolerated treatment well and required verbal and tactile cues for good technique with TE Rosella Goldmann Patient demonstrated fatigue post treatment and would benefit from continued PT      Plan: Continue per plan of care  Progress treatment as tolerated         Precautions: DM2, HTN, history of heart attack      Manual             TC distraction Kenny Gr 4 and 5           TC posterior glide Kenny LR           Calcaneal mobilization KENNY LR                        Fibular taping KENNY AB               Exercise Diary             Bike  10'           gastroc stretch 3x30" 3x30"           4 way ankle  GTB  3x10           Biodex  Wt shift fwd/lat and LOS           Standing on foam             Seated HS stretch  15"x5                                                                                                                                                                                                     Modalities

## 2019-08-27 ENCOUNTER — OFFICE VISIT (OUTPATIENT)
Dept: PHYSICAL THERAPY | Facility: REHABILITATION | Age: 79
End: 2019-08-27
Payer: MEDICARE

## 2019-08-27 DIAGNOSIS — N32.81 OVERACTIVE BLADDER: ICD-10-CM

## 2019-08-27 DIAGNOSIS — E78.2 MIXED HYPERLIPIDEMIA: Primary | ICD-10-CM

## 2019-08-27 DIAGNOSIS — S93.401S SPRAIN OF RIGHT ANKLE, UNSPECIFIED LIGAMENT, SEQUELA: Primary | ICD-10-CM

## 2019-08-27 PROCEDURE — 97112 NEUROMUSCULAR REEDUCATION: CPT | Performed by: PHYSICAL THERAPIST

## 2019-08-27 PROCEDURE — 97140 MANUAL THERAPY 1/> REGIONS: CPT | Performed by: PHYSICAL THERAPIST

## 2019-08-27 PROCEDURE — 97110 THERAPEUTIC EXERCISES: CPT | Performed by: PHYSICAL THERAPIST

## 2019-08-27 RX ORDER — SOLIFENACIN SUCCINATE 10 MG/1
TABLET, FILM COATED ORAL
Qty: 90 TABLET | Refills: 2 | Status: SHIPPED | OUTPATIENT
Start: 2019-08-27 | End: 2020-06-29

## 2019-08-27 RX ORDER — SIMVASTATIN 20 MG
TABLET ORAL
Qty: 90 TABLET | Refills: 3 | Status: SHIPPED | OUTPATIENT
Start: 2019-08-27 | End: 2019-09-09 | Stop reason: ALTCHOICE

## 2019-08-27 NOTE — PROGRESS NOTES
Daily Note     Today's date: 2019  Patient name: Daria Andrew  : 1940  MRN: [id number]  Referring provider: Candido Guthrie DPM  Dx:   Encounter Diagnosis     ICD-10-CM    1  Sprain of right ankle, unspecified ligament, sequela S93 401S                   Subjective: Pt reports that his ankle is feeling improved  States he has not had pain for several days and he has noticed an improvement in his gait pattern  Objective: See treatment diary below      Assessment: Tolerated treatment well and required verbal and tactile cues for good technique with TE  Pt presented with improved TC and ST mobility this visit  Challenged with balance activity Patient demonstrated fatigue post treatment and would benefit from continued PT      Plan: Continue per plan of care  Progress treatment as tolerated         Precautions: DM2, HTN, history of heart attack      Manual            TC distraction Kenny Gr 4 and 5 KENNY          TC posterior glide Kenny LR KENNY          Calcaneal mobilization KENNY LR KENNY                       Fibular taping KENNY AB NP              Exercise Diary            Bike  10' 10'          gastroc stretch 3x30" 3x30" 3x30"          4 way ankle  GTB  3x10 GTB 2x15 each          Biodex  Wt shift fwd/lat and LOS LOS 3x           SLS   3x30"          Seated HS stretch  15"x5 NP          Heel Raise                                                                                                                                                                                           Modalities

## 2019-08-29 ENCOUNTER — OFFICE VISIT (OUTPATIENT)
Dept: PHYSICAL THERAPY | Facility: REHABILITATION | Age: 79
End: 2019-08-29
Payer: MEDICARE

## 2019-08-29 DIAGNOSIS — S93.401S SPRAIN OF RIGHT ANKLE, UNSPECIFIED LIGAMENT, SEQUELA: Primary | ICD-10-CM

## 2019-08-29 PROCEDURE — 97140 MANUAL THERAPY 1/> REGIONS: CPT | Performed by: PHYSICAL THERAPIST

## 2019-08-29 PROCEDURE — 97110 THERAPEUTIC EXERCISES: CPT

## 2019-08-29 PROCEDURE — 97112 NEUROMUSCULAR REEDUCATION: CPT

## 2019-08-29 NOTE — PROGRESS NOTES
Daily Note     Today's date: 2019  Patient name: Alessandro Langley  : 1940  MRN: [id number]  Referring provider: Teetee Phillips DPM  Dx:   Encounter Diagnosis     ICD-10-CM    1  Sprain of right ankle, unspecified ligament, sequela S93 401S        Start Time: 1130  Stop Time: 1220  Total time in clinic (min): 50 minutes    Subjective: Pt reports that his greatest limitation is ascending and descending stairs  Objective: See treatment diary below      Assessment: Tolerated treatment well and required verbal and tactile cues for good technique with TE  Pt with most difficulty with balance interventions  TB ankle interventions in seated due to pt c/o tight hamstrings  Patient demonstrated fatigue post treatment and would benefit from continued PT      Plan: Continue per plan of care  Progress treatment as tolerated  trial step ups NV if appropriate        Precautions: DM2, HTN, history of heart attack      Manual           TC distraction Kenny Gr 4 and 5 KENNY DS         TC posterior glide Kenny LR KENNY DS         Calcaneal mobilization KENNY LR KENNY DS                      Fibular taping KENNY AB NP np             Exercise Diary           Bike  10' 10' 12'         gastroc stretch 3x30" 3x30" 3x30" 3x30"         4 way ankle  GTB  3x10 GTB 2x15 each GTB  2x15         Biodex  Wt shift fwd/lat and LOS LOS 3x  LOS 3x  59%         SLS   3x30" 3x30"         Seated HS stretch  15"x5 NP 20"x4         Heel Raise                                                                                                                                                                                           Modalities

## 2019-09-03 ENCOUNTER — OFFICE VISIT (OUTPATIENT)
Dept: PHYSICAL THERAPY | Facility: REHABILITATION | Age: 79
End: 2019-09-03
Payer: MEDICARE

## 2019-09-03 DIAGNOSIS — S93.401S SPRAIN OF RIGHT ANKLE, UNSPECIFIED LIGAMENT, SEQUELA: Primary | ICD-10-CM

## 2019-09-03 PROCEDURE — 97140 MANUAL THERAPY 1/> REGIONS: CPT | Performed by: PHYSICAL THERAPIST

## 2019-09-03 PROCEDURE — 97112 NEUROMUSCULAR REEDUCATION: CPT | Performed by: PHYSICAL THERAPIST

## 2019-09-03 NOTE — PROGRESS NOTES
Daily Note     Today's date: 9/3/2019  Patient name: Meek Morales  : 1940  MRN: [id number]  Referring provider: Manny Cunningham DPM  Dx:   Encounter Diagnosis     ICD-10-CM    1  Sprain of right ankle, unspecified ligament, sequela S93 401S        Start Time: 1220  Stop Time: 1305  Total time in clinic (min): 45 minutes    Subjective: Patient reports no significant changes to overall status since his last treatment session  Objective: See treatment diary below      Assessment: Tolerated treatment well  Patient demonstrated fatigue post treatment, exhibited good technique with therapeutic exercises and would benefit from continued PT  Patient reported decreased ankle pain at end of session  Patient continues to have significant difficulty performing balance tasks with one episode of LOB anteriorly requiring assistance of hands to correct  Plan: Continue per plan of care  Progress treatment as tolerated  Precautions: DM2, HTN, history of heart attack      Manual   93        TC distraction Kenny Gr 4 and 5 KENNY DS GR        TC posterior glide Kenny LR KENNY DS GR        Calcaneal mobilization KENNY LR KENNY DS GR                     Fibular taping KENNY AB NP np             Exercise Diary   93        Bike  10' 10' 12' 14'        gastroc stretch 3x30" 3x30" 3x30" 3x30" 5x30"        4 way ankle  GTB  3x10 GTB 2x15 each GTB  2x15         Biodex  Wt shift fwd/lat and LOS LOS 3x  LOS 3x  59% LOS 3x 36%        SLS   3x30" 3x30"         Seated HS stretch  15"x5 NP 20"x4         Heel Raise      30x        Highstepping over cones: forward, lateral     2x ea          Cone taps     2'        FSD     15x 8"                                                                                                                                              Modalities

## 2019-09-05 NOTE — PROGRESS NOTES
Daily Note     Today's date: 2019  Patient name: Daniel Ng  : 1940  MRN: [id number]  Referring provider: Terrence Pratt DPM  Dx:   Encounter Diagnosis     ICD-10-CM    1  Sprain of right ankle, unspecified ligament, sequela S93 401S                   Subjective: Patient reports that his ankle is feeling improved however he continues to have some lateral pain when he climbs stairs  Objective: See treatment diary below      Assessment: Tolerated treatment well  Patient demonstrated fatigue post treatment, exhibited good technique with therapeutic exercises and would benefit from continued PT  Pt has difficulty with heel raise secondary to PF weakness  Hypomobility noted throughout manual  Pt was able to climb and descend steps without pain during treatment  Plan: Continue per plan of care  Progress treatment as tolerated  Precautions: DM2, HTN, history of heart attack      Manual  8/19 8/22 8/27 08/29 9/3 9/6       TC distraction Kenny Gr 4 and 5 KENNY DS GR KENNY       TC posterior glide Kenny LR KENNY DS GR KENNY       Calcaneal mobilization KENNY LR KENNY DS GR KENNY                    Fibular taping KENNY AB NP np  KENNY           Exercise Diary  8/19 8/22 8/27 08/29 9/3 9/6       Bike  10' 10' 12' 14' 8'       gastroc stretch 3x30" 3x30" 3x30" 3x30" 5x30"        4 way ankle  GTB  3x10 GTB 2x15 each GTB  2x15  NP       Biodex  Wt shift fwd/lat and LOS LOS 3x  LOS 3x  59% LOS 3x 36% LOS 3x 36%       SLS   3x30" 3x30"  3x30"       Seated HS stretch  15"x5 NP 20"x4         Heel Raise      30x 3x10       Highstepping over cones: forward, lateral     2x ea          Cone taps     2' Stair taps 20 each       FSD     15x 8"                                                                                                                                              Modalities

## 2019-09-06 ENCOUNTER — OFFICE VISIT (OUTPATIENT)
Dept: PHYSICAL THERAPY | Facility: REHABILITATION | Age: 79
End: 2019-09-06
Payer: MEDICARE

## 2019-09-06 DIAGNOSIS — S93.401S SPRAIN OF RIGHT ANKLE, UNSPECIFIED LIGAMENT, SEQUELA: Primary | ICD-10-CM

## 2019-09-06 PROCEDURE — 97110 THERAPEUTIC EXERCISES: CPT | Performed by: PHYSICAL THERAPIST

## 2019-09-06 PROCEDURE — 97112 NEUROMUSCULAR REEDUCATION: CPT | Performed by: PHYSICAL THERAPIST

## 2019-09-06 PROCEDURE — 97140 MANUAL THERAPY 1/> REGIONS: CPT | Performed by: PHYSICAL THERAPIST

## 2019-09-09 ENCOUNTER — OFFICE VISIT (OUTPATIENT)
Dept: CARDIOLOGY CLINIC | Facility: CLINIC | Age: 79
End: 2019-09-09
Payer: MEDICARE

## 2019-09-09 ENCOUNTER — OFFICE VISIT (OUTPATIENT)
Dept: PHYSICAL THERAPY | Facility: REHABILITATION | Age: 79
End: 2019-09-09
Payer: MEDICARE

## 2019-09-09 VITALS
HEIGHT: 68 IN | SYSTOLIC BLOOD PRESSURE: 180 MMHG | DIASTOLIC BLOOD PRESSURE: 70 MMHG | WEIGHT: 200 LBS | HEART RATE: 60 BPM | BODY MASS INDEX: 30.31 KG/M2

## 2019-09-09 DIAGNOSIS — I25.10 CORONARY ARTERY DISEASE INVOLVING NATIVE CORONARY ARTERY OF NATIVE HEART WITHOUT ANGINA PECTORIS: Primary | ICD-10-CM

## 2019-09-09 DIAGNOSIS — I10 BENIGN ESSENTIAL HYPERTENSION: ICD-10-CM

## 2019-09-09 DIAGNOSIS — S93.401S SPRAIN OF RIGHT ANKLE, UNSPECIFIED LIGAMENT, SEQUELA: Primary | ICD-10-CM

## 2019-09-09 DIAGNOSIS — I25.5 ISCHEMIC CARDIOMYOPATHY: ICD-10-CM

## 2019-09-09 DIAGNOSIS — Z95.1 HX OF CABG: ICD-10-CM

## 2019-09-09 DIAGNOSIS — E78.2 MIXED HYPERLIPIDEMIA: ICD-10-CM

## 2019-09-09 PROCEDURE — 1124F ACP DISCUSS-NO DSCNMKR DOCD: CPT | Performed by: INTERNAL MEDICINE

## 2019-09-09 PROCEDURE — 97110 THERAPEUTIC EXERCISES: CPT | Performed by: PHYSICAL THERAPIST

## 2019-09-09 PROCEDURE — 97112 NEUROMUSCULAR REEDUCATION: CPT | Performed by: PHYSICAL THERAPIST

## 2019-09-09 PROCEDURE — 99214 OFFICE O/P EST MOD 30 MIN: CPT | Performed by: INTERNAL MEDICINE

## 2019-09-09 RX ORDER — ATORVASTATIN CALCIUM 40 MG/1
40 TABLET, FILM COATED ORAL DAILY
Qty: 90 TABLET | Refills: 3 | Status: SHIPPED | OUTPATIENT
Start: 2019-09-09 | End: 2019-11-25

## 2019-09-09 NOTE — PROGRESS NOTES
Daily Note     Today's date: 2019  Patient name: Samuel Bennett  : 1940  MRN: [id number]  Referring provider: Jaime Bhatia DPM  Dx:   Encounter Diagnosis     ICD-10-CM    1  Sprain of right ankle, unspecified ligament, sequela S93 401S                   Subjective: Pt reports that his ankle feels good  Denies pain with stairs since LV  States he would like to work on balance today  Objective: See treatment diary below      Assessment: Tolerated treatment well  Pt showed improved proprioception with Biodex this visit but continued to be challenged by both static and dynamic balance  He had no ankle pain throughout treatment  Patient demonstrated fatigue post treatment, exhibited good technique with therapeutic exercises and would benefit from continued PT  Plan: Continue per plan of care  Progress treatment as tolerated  Precautions: DM2, HTN, history of heart attack      Manual  8/19 8/22 8/27 08/29 9/3 9/6 9/9      TC distraction Kenny Gr 4 and 5 KENNY DS GR KENNY NP      TC posterior glide Kenny LR KENNY DS GR KENNY NP      Calcaneal mobilization KENNY LR KENNY DS GR KENNY NP                   Fibular taping KENNY AB NP np  KENNY NP          Exercise Diary  8/19 8/22 8/27 08/29 9/3 9/6 9/9      Bike  10' 10' 12' 14' 8' 10'      gastroc stretch 3x30" 3x30" 3x30" 3x30" 5x30"        4 way ankle  GTB  3x10 GTB 2x15 each GTB  2x15  NP HEP      Biodex  Wt shift fwd/lat and LOS LOS 3x  LOS 3x  59% LOS 3x 36% LOS 3x 36% LOS 3x 50%      SLS   3x30" 3x30"  3x30" 3x30"      Seated HS stretch  15"x5 NP 20"x4         Heel Raise      30x 3x10 3x10      Highstepping over cones: forward, lateral     2x ea    2x each      Cone taps     2' Stair taps 20 each 20 each      FSD     15x 8"                     Heel walking       3 laps      Toe walking       3 laps      Tandem walking       3 laps      Ball bounce on foam       30                                                                           Modalities

## 2019-09-09 NOTE — PROGRESS NOTES
Cardiology Follow Up    Ewa Sanderson [facility]  1940  [id number]  [facility] [place] [id number] [phone]    1  Coronary artery disease involving native coronary artery of native heart without angina pectoris     2  Benign essential hypertension     3  Mixed hyperlipidemia  atorvastatin (LIPITOR) 40 mg tablet   4  Ischemic cardiomyopathy     5  Hx of CABG           Discussion/Summary: I will d/c his Zocor to Lipitor 40 mg daily  I advised him to monitor his BP closely at home and call if his SBP is consistently above 130 mmHg  If so, I will start Norvasc 5 mg daily  I stress to him to cut back on his salt intake  RTO 9 months  Interval History: He has not had any cardiac problems since his last office visit  He denies CP, SOB, palpitations  His BP is elevated at 180/70 which came down to 138 /70  He admits to using a lot of salt  Recent echo showed an EF of 45%  EST  5 minutes 31 seconds , no CP, no ischemia  LDL is up to 81  He is on Zocor 20 mg daily  He has CAD with CABG x4 in 2014  Patient Active Problem List   Diagnosis    Backache    Benign essential hypertension    DMII (diabetes mellitus, type 2) ([address]    Hyperlipidemia    Wright's esophagus with esophagitis    Acute kidney injury ([address] )    Overactive bladder    Bladder tumor    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)    Hx of CABG    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    Obesity (BMI 30 0-34  9)    Closed fracture of upper end of right fibula    Medicare annual wellness visit, subsequent    History of bladder cancer    Coronary artery disease involving native coronary artery of native heart without angina pectoris    Ischemic cardiomyopathy     Past Medical History:   Diagnosis Date    Acute kidney injury ([address] )     Arthritis Hands    Wright esophagus     BPH with obstruction/lower urinary tract symptoms     CAD (coronary artery disease)     Last assessed 09/16/15    Cataract, acquired     Last assessed 10/10/17    Diabetes mellitus ([facility] )     NIDDM    Diabetic neuropathy ([facility] )     Feet    Enlarged prostate with lower urinary tract symptoms (LUTS)     Last assessed 10/10/17    Erectile dysfunction     GERD (gastroesophageal reflux disease)     Last assessed 10/10/17    Hemoptysis     Last assessed 16    Hypercholesterolemia     Last assessed 10/10/17    Hypertension     Last assessed 10/10/17    Macular degeneration     Right eye is particularly affected    Myocardial infarction ([facility] )     OAB (overactive bladder)     Testicular hypofunction     Testicular hypogonadism     Last assessed 95/15/40    Umbilical hernia     Last assessed 14    Urge incontinence of urine     Wears glasses      Social History     Socioeconomic History    Marital status: /Civil Union     Spouse name: Not on file    Number of children: Not on file    Years of education: Not on file    Highest education level: Not on file   Occupational History    Occupation: Sales position   Social Needs    Financial resource strain: Not on file    Food insecurity:     Worry: Not on file     Inability: Not on file   [employer] needs:     Medical: Not on file     Non-medical: Not on file   Tobacco Use    Smoking status: Former Smoker     Packs/day: 1 00     Years: 13 00     Pack years: 13 00     Types: Cigarettes     Last attempt to quit: 1972     Years since quittin 7    Smokeless tobacco: Never Used   Substance and Sexual Activity    Alcohol use:  Yes     Alcohol/week: 2 0 standard drinks     Types: 1 Glasses of wine, 1 Cans of beer per week     Frequency: 2-3 times a week     Drinks per session: 1 or 2     Comment: 1 drink daily- a mixed drink or wine    Drug use: No    Sexual activity: Not on file   Lifestyle  Physical activity:     Days per week: Not on file     Minutes per session: Not on file    Stress: Not on file   Relationships    Social connections:     Talks on phone: Not on file     Gets together: Not on file     Attends Religion service: Not on file     Active member of club or organization: Not on file     Attends meetings of clubs or organizations: Not on file     Relationship status: Not on file    Intimate partner violence:     Fear of current or ex partner: Not on file     Emotionally abused: Not on file     Physically abused: Not on file     Forced sexual activity: Not on file   Other Topics Concern    Not on file   Social History Narrative    Always uses seatbelt        Caffeine use- Drinks 2 cups of coffee daily      Family History   Problem Relation Age of Onset    Cancer Mother     Other Mother         Digestive System Complications    Diabetes Father     Heart disease Father     Hypertension Father     Coronary artery disease Father     Hyperlipidemia Father      Past Surgical History:   Procedure Laterality Date    COLONOSCOPY      CORONARY ARTERY BYPASS GRAFT  07/16/2014    ABG x 4 LIMA to LAD,SVG to diagnoal 2 SVG to OM-1, SVG to PDA, resection of partial plerual mass    CYSTOSCOPY  2013    ESOPHAGOGASTRODUODENOSCOPY      FL RETROGRADE PYELOGRAM  12/20/2018    INGUINAL HERNIA REPAIR Bilateral 2015    PHOTODYNAMIC THERAPY      For Barretts esophagus    NM COLONOSCOPY FLX DX W/COLLJ SPEC WHEN PFRMD N/A 4/25/2016    Procedure: COLONOSCOPY;  Surgeon: Ankur Wagoner MD;  Location: [facility];   Service: Gastroenterology    TONSILLECTOMY      TRANSURETHRAL RESECTION OF PROSTATE N/A 12/20/2018    Procedure: CYSTO, PHOTO SELECTIVE VAPORIZATION OF PROSTATE, B/L RETROGRADE PYELOGRAM, TURBT;  Surgeon: Emma Fischer MD;  Location: [facility];  Service: Urology    UMBILICAL HERNIA REPAIR  2012       Current Outpatient Medications:     irbesartan (AVAPRO) 300 mg tablet, Take 1 tablet (300 mg total) by mouth every morning, Disp: 90 tablet, Rfl: 3    metFORMIN (GLUCOPHAGE-XR) 500 mg 24 hr tablet, Take 1 tablet (500 mg total) by mouth 2 (two) times a day with meals, Disp: 180 tablet, Rfl: 3    metoprolol tartrate (LOPRESSOR) 50 mg tablet, Take 1 tablet (50 mg total) by mouth 2 (two) times a day, Disp: 180 tablet, Rfl: 3    Multiple Vitamins-Minerals (CENTRUM SILVER 50+MEN PO), Take 1 tablet by mouth daily  , Disp: , Rfl:     Multiple Vitamins-Minerals (OCUVITE-LUTEIN PO), Take 1 tablet by mouth 2 (two) times a day  , Disp: , Rfl:     Omega-3 Fatty Acids (FISH OIL) 1200 MG CAPS, Take 1 capsule by mouth daily  , Disp: , Rfl:     omeprazole (PriLOSEC) 40 MG capsule, Take 40 mg by mouth daily at bedtime  , Disp: , Rfl:     VESICARE 10 MG tablet, TAKE ONE TABLET BY MOUTH EVERY DAY, Disp: 90 tablet, Rfl: 2    aspirin 81 MG tablet, Take 81 mg by mouth daily  , Disp: , Rfl:     atorvastatin (LIPITOR) 40 mg tablet, Take 1 tablet (40 mg total) by mouth daily, Disp: 90 tablet, Rfl: 3  Allergies   Allergen Reactions    Morphine And Related Other (See Comments)     While having an MI patient received morphine and had adverse reaction but doesn't know what happened  Vitals:    09/09/19 1524   BP: (!) 180/70   BP Location: Left arm   Cuff Size: Large   Pulse: 60   Weight: 90 7 kg (200 lb)   Height: 5' 8" (1 727 m)     Weight (last 2 days)     Date/Time   Weight    09/09/19 1524   90 7 (200)             Blood pressure (!) 180/70, pulse 60, height 5' 8" (1 727 m), weight 90 7 kg (200 lb)  , Body mass index is 30 41 kg/m²      Labs:  Orders Only on 07/30/2019   Component Date Value    Case Report 08/03/2019                      Value:Non-gynecologic Cytology                          Case: [id number]                                  Authorizing Provider:  Adonis Beaulieu MD      Collected:           08/03/2019 0946              Ordering Location:     [facility]        Received: 08/03/2019 0946                                     [facility] [place]                                                            Pathologist:           Abagail Schilder, MD                                                            Specimen:    Urine, Voided                                                                              Final Diagnosis 08/03/2019                      Value: This result contains rich text formatting which cannot be displayed here   Note 08/03/2019                      Value: This result contains rich text formatting which cannot be displayed here  [facility] Description 08/03/2019                      Value: This result contains rich text formatting which cannot be displayed here   Additional Information 08/03/2019                      Value: This result contains rich text formatting which cannot be displayed here  Procedure visit on 07/30/2019   Component Date Value     COLOR,UA 07/30/2019 yellow     CLARITY,UA 07/30/2019 clear     SPECIFIC GRAVITY,UA 07/30/2019 <=1 005      PH,UA 07/30/2019 5 5     LEUKOCYTE ESTERASE,UA 07/30/2019 neg     NITRITE,UA 07/30/2019 neg     GLUCOSE, UA 07/30/2019 neg     KETONES,UA 07/30/2019 neg     BILIRUBIN,UA 07/30/2019 neg     BLOOD,UA 07/30/2019 neg     POCT URINE PROTEIN 07/30/2019 neg     SL AMB POCT UROBILINOGEN 07/30/2019 0 2    Hospital Outpatient Visit on 07/02/2019   Component Date Value    Protocol Name 07/02/2019 SAMI     Time In Exercise Phase 07/02/2019 00:05:31     MAX   SYSTOLIC BP 42/64/6022 536     Max Diastolic Bp 63/67/1275 90     Max Heart Rate 07/02/2019 134     Max Predicted Heart Rate 07/02/2019 142     Reason for Termination 07/02/2019                      Value:Fatigue  Leg discomfort      Test Indication 07/02/2019 CAD     Target Hr Formular 07/02/2019 (220 - Age)*100%     Chest Pain Statement 07/02/2019 none    Orders Only on 06/05/2019   Component Date Value    Right Eye Diabetic Retin* 06/05/2019 None  Left Eye Diabetic Retino* 06/05/2019 None    Office Visit on 06/05/2019   Component Date Value    Case Report 06/05/2019                      Value:Surgical Pathology Report                         Case: [id number]                                   Authorizing Provider:  Parker Hinton MD          Collected:           06/05/2019 1521              Ordering Location[facility]     [facility]      Received:            06/05/2019 [facility]                                                                Pathologist:           Asha Smith MD                                                             Specimen:    Skin, Other, Skin, Left base of neck                                                       Final Diagnosis 06/05/2019                      Value: This result contains rich text formatting which cannot be displayed here   Additional Information 06/05/2019                      Value: This result contains rich text formatting which cannot be displayed here  [facility] Description 06/05/2019                      Value: This result contains rich text formatting which cannot be displayed here   Clinical Information 06/05/2019                      Value:Specimen A; Skin; Shave biopsy; Left base of neck; 66year old male with a 1 0 cm verrucous gray/black to red plaque;  Differential: Pigmented seborrheic keratosis versus pigmented basal cell carcinoma   Orders Only on 04/09/2019   Component Date Value    Right Eye Diabetic Retin* 04/09/2019 Mild     Left Eye Diabetic Retino* 04/09/2019 Mild    Appointment on 04/02/2019   Component Date Value    Sodium 04/02/2019 137     Potassium 04/02/2019 4 0     Chloride 04/02/2019 106     CO2 04/02/2019 28     ANION GAP 04/02/2019 3*    BUN 04/02/2019 24     Creatinine 04/02/2019 1 24     Glucose, Fasting 04/02/2019 135*    Calcium 04/02/2019 9 1     AST 04/02/2019 14     ALT 04/02/2019 22     Alkaline Phosphatase 04/02/2019 79     Total Protein 04/02/2019 7 4     Albumin 04/02/2019 3 8     Total Bilirubin 04/02/2019 0 64     eGFR 04/02/2019 55     WBC 04/02/2019 11 22*    RBC 04/02/2019 4 75     Hemoglobin 04/02/2019 14 7     Hematocrit 04/02/2019 44 5     MCV 04/02/2019 94     MCH 04/02/2019 30 9     MCHC 04/02/2019 33 0     RDW 04/02/2019 14 1     MPV 04/02/2019 12 5     Platelets 63/11/1164 176     nRBC 04/02/2019 0     Neutrophils Relative 04/02/2019 72     Immat GRANS % 04/02/2019 0     Lymphocytes Relative 04/02/2019 16     Monocytes Relative 04/02/2019 9     Eosinophils Relative 04/02/2019 2     Basophils Relative 04/02/2019 1     Neutrophils Absolute 04/02/2019 8 07*    Immature Grans Absolute 04/02/2019 0 05     Lymphocytes Absolute 04/02/2019 1 74     Monocytes Absolute 04/02/2019 1 03     Eosinophils Absolute 04/02/2019 0 27     Basophils Absolute 04/02/2019 0 06     Cholesterol 04/02/2019 150     Triglycerides 04/02/2019 87     HDL, Direct 04/02/2019 52     LDL Calculated 04/02/2019 81     Non-HDL-Chol (CHOL-HDL) 04/02/2019 98     Hemoglobin A1C 04/02/2019 6 0     EAG 04/02/2019 126     Color, UA 04/02/2019 Yellow     Clarity, UA 04/02/2019 Clear     Specific Gravity, UA 04/02/2019 1 023     pH, UA 04/02/2019 6 0     Leukocytes, UA 04/02/2019 Small*    Nitrite, UA 04/02/2019 Negative     Protein, UA 04/02/2019 30 (1+)*    Glucose, UA 04/02/2019 Negative     Ketones, UA 04/02/2019 Negative     Urobilinogen, UA 04/02/2019 0 2     Bilirubin, UA 04/02/2019 Negative     Blood, UA 04/02/2019 Negative     RBC, UA 04/02/2019 None Seen     WBC, UA 04/02/2019 10-20*    Epithelial Cells 04/02/2019 None Seen     Bacteria, UA 04/02/2019 None Seen     Hyaline Casts, UA 04/02/2019 None Seen      Imaging: Xr Ankle 3+ Vw Right    Result Date: 8/18/2019  Narrative: RIGHT ANKLE INDICATION:   S93 401A: Sprain of unspecified ligament of right ankle, initial encounter   COMPARISON: Right ankle plain films from 11/20/2019  VIEWS:  XR ANKLE 3+ VW RIGHT FINDINGS: There is no acute fracture or dislocation  Large plantar calcaneal spur  No lytic or blastic lesions seen  Soft tissues are unremarkable  Impression: No acute osseous abnormality  Workstation performed: [id number]       Review of Systems:  Review of Systems   Constitutional: Negative for diaphoresis, fatigue, fever and unexpected weight change  HENT: Negative  Respiratory: Negative for cough, shortness of breath and wheezing  Cardiovascular: Negative for chest pain, palpitations and leg swelling  Gastrointestinal: Negative for abdominal pain, diarrhea and nausea  Musculoskeletal: Negative for gait problem and myalgias  Skin: Negative for rash  Neurological: Negative for dizziness and numbness  Psychiatric/Behavioral: Negative  Physical Exam:  Physical Exam   Constitutional: He is oriented to person, place, and time  He appears well-developed and well-nourished  HENT:   Head: Normocephalic and atraumatic  Eyes: Pupils are equal, round, and reactive to light  Neck: Normal range of motion  Neck supple  No JVD present  Cardiovascular: Regular rhythm, S1 normal, S2 normal and normal pulses  Pulses:       Carotid pulses are 2+ on the right side, and 2+ on the left side  Pulmonary/Chest: Effort normal and breath sounds normal  He has no wheezes  He has no rales  Abdominal: Soft  Bowel sounds are normal  There is no tenderness  Musculoskeletal: Normal range of motion  He exhibits no edema or tenderness  Neurological: He is alert and oriented to person, place, and time  He has normal reflexes  No cranial nerve deficit  Skin: Skin is warm  Psychiatric: He has a normal mood and affect

## 2019-09-12 ENCOUNTER — OFFICE VISIT (OUTPATIENT)
Dept: PHYSICAL THERAPY | Facility: REHABILITATION | Age: 79
End: 2019-09-12
Payer: MEDICARE

## 2019-09-12 DIAGNOSIS — S93.401S SPRAIN OF RIGHT ANKLE, UNSPECIFIED LIGAMENT, SEQUELA: Primary | ICD-10-CM

## 2019-09-12 PROCEDURE — 97112 NEUROMUSCULAR REEDUCATION: CPT | Performed by: PHYSICAL THERAPIST

## 2019-09-12 PROCEDURE — 97110 THERAPEUTIC EXERCISES: CPT | Performed by: PHYSICAL THERAPIST

## 2019-09-12 NOTE — PROGRESS NOTES
Daily Note /Discharge     Today's date: 2019  Patient name: Nighat Quinones  : 1940  MRN: [id number]  Referring provider: Gerhardt Denise, DPM  Dx:   Encounter Diagnosis     ICD-10-CM    1  Sprain of right ankle, unspecified ligament, sequela S93 401S                   Subjective: Pt reports that his ankle feels good and he has been doing everything at home  States he would like to make today his last day  Objective: See treatment diary below  Goals  Short Term  1: Patient will report a 50% decrease in pain in 2-4 weeks  - MET 1/10 max  2: Pt will have functional dorsiflexion of 10 degrees in 4 weeks  - MEt  Long Term  1: Patient will demonstrate independence in home exercise program by discharge  - MET  2: Patient will have FOTO score of 64 indicating improved function in 8 weeks- MET    Assessment: Pt has met all goals for therapy and is indepdent in HEP  Plan: Pt discharged to HEP at this time  Precautions: DM2, HTN, history of heart attack      Manual  8/19 8/22 8/27 08/29 9/3 9/6 9/9 9 12     TC distraction Kenny Gr 4 and 5 KENNY DS GR KENNY NP NP     TC posterior glide Kenny LR KENNY DS GR KENNY NP NP     Calcaneal mobilization KENNY LR KENNY DS GR KENNY NP NP                  Fibular taping KENNY AB NP np  KENNY NP NP       Exercise Diary  8/19 8/22 8/27 08/29 9/3 9/6 9/9 9/12     Bike  10' 10' 12' 14' 8' 10' 10     gastroc stretch 3x30" 3x30" 3x30" 3x30" 5x30"        4 way ankle  GTB  3x10 GTB 2x15 each GTB  2x15  NP HEP HEP     Biodex  Wt shift fwd/lat and LOS LOS 3x  LOS 3x  59% LOS 3x 36% LOS 3x 36% LOS 3x 50% Los 3x 50%     SLS   3x30" 3x30"  3x30" 3x30"      Seated HS stretch  15"x5 NP 20"x4         Heel Raise      30x 3x10 3x10 3x10     Highstepping over cones: forward, lateral     2x ea    2x each 2x lateral     Cone taps     2' Stair taps 20 each 20 each      FSD     15x 8"                     Heel walking       3 laps 3 laps     Toe walking       3 laps 3 laps     Tandem walking       3 laps 3 laps Ball bounce on foam       30                                                                           Modalities

## 2019-09-16 ENCOUNTER — APPOINTMENT (OUTPATIENT)
Dept: PHYSICAL THERAPY | Facility: REHABILITATION | Age: 79
End: 2019-09-16
Payer: MEDICARE

## 2019-09-19 ENCOUNTER — APPOINTMENT (OUTPATIENT)
Dept: PHYSICAL THERAPY | Facility: REHABILITATION | Age: 79
End: 2019-09-19
Payer: MEDICARE

## 2019-09-23 ENCOUNTER — TELEPHONE (OUTPATIENT)
Dept: OTHER | Facility: OTHER | Age: 79
End: 2019-09-23

## 2019-09-24 ENCOUNTER — APPOINTMENT (OUTPATIENT)
Dept: LAB | Facility: CLINIC | Age: 79
End: 2019-09-24
Payer: MEDICARE

## 2019-09-24 DIAGNOSIS — E11.59 TYPE 2 DIABETES MELLITUS WITH OTHER CIRCULATORY COMPLICATION, WITHOUT LONG-TERM CURRENT USE OF INSULIN (HCC): ICD-10-CM

## 2019-09-24 DIAGNOSIS — I10 BENIGN ESSENTIAL HYPERTENSION: ICD-10-CM

## 2019-09-24 LAB
ANION GAP SERPL CALCULATED.3IONS-SCNC: 6 MMOL/L (ref 4–13)
BUN SERPL-MCNC: 30 MG/DL (ref 5–25)
CALCIUM SERPL-MCNC: 9.5 MG/DL (ref 8.3–10.1)
CHLORIDE SERPL-SCNC: 107 MMOL/L (ref 100–108)
CO2 SERPL-SCNC: 28 MMOL/L (ref 21–32)
CREAT SERPL-MCNC: 1.39 MG/DL (ref 0.6–1.3)
EST. AVERAGE GLUCOSE BLD GHB EST-MCNC: 140 MG/DL
GFR SERPL CREATININE-BSD FRML MDRD: 48 ML/MIN/1.73SQ M
GLUCOSE P FAST SERPL-MCNC: 138 MG/DL (ref 65–99)
HBA1C MFR BLD: 6.5 % (ref 4.2–6.3)
POTASSIUM SERPL-SCNC: 4.1 MMOL/L (ref 3.5–5.3)
SODIUM SERPL-SCNC: 141 MMOL/L (ref 136–145)

## 2019-09-24 PROCEDURE — 36415 COLL VENOUS BLD VENIPUNCTURE: CPT

## 2019-09-24 PROCEDURE — 80048 BASIC METABOLIC PNL TOTAL CA: CPT

## 2019-09-24 PROCEDURE — 83036 HEMOGLOBIN GLYCOSYLATED A1C: CPT

## 2019-10-08 ENCOUNTER — OFFICE VISIT (OUTPATIENT)
Dept: INTERNAL MEDICINE CLINIC | Facility: CLINIC | Age: 79
End: 2019-10-08
Payer: MEDICARE

## 2019-10-08 VITALS
DIASTOLIC BLOOD PRESSURE: 72 MMHG | BODY MASS INDEX: 30.71 KG/M2 | TEMPERATURE: 98.9 F | HEART RATE: 63 BPM | SYSTOLIC BLOOD PRESSURE: 130 MMHG | HEIGHT: 68 IN | OXYGEN SATURATION: 99 % | WEIGHT: 202.6 LBS

## 2019-10-08 DIAGNOSIS — I25.10 CORONARY ARTERY DISEASE INVOLVING NATIVE CORONARY ARTERY OF NATIVE HEART WITHOUT ANGINA PECTORIS: ICD-10-CM

## 2019-10-08 DIAGNOSIS — K20.90 BARRETT'S ESOPHAGUS WITH ESOPHAGITIS: ICD-10-CM

## 2019-10-08 DIAGNOSIS — Z23 ENCOUNTER FOR IMMUNIZATION: Primary | ICD-10-CM

## 2019-10-08 DIAGNOSIS — E11.59 TYPE 2 DIABETES MELLITUS WITH OTHER CIRCULATORY COMPLICATION, WITHOUT LONG-TERM CURRENT USE OF INSULIN (HCC): ICD-10-CM

## 2019-10-08 DIAGNOSIS — K22.70 BARRETT'S ESOPHAGUS WITH ESOPHAGITIS: ICD-10-CM

## 2019-10-08 DIAGNOSIS — N17.9 ACUTE KIDNEY INJURY (HCC): ICD-10-CM

## 2019-10-08 DIAGNOSIS — I10 BENIGN ESSENTIAL HYPERTENSION: ICD-10-CM

## 2019-10-08 PROCEDURE — G0008 ADMIN INFLUENZA VIRUS VAC: HCPCS

## 2019-10-08 PROCEDURE — 90662 IIV NO PRSV INCREASED AG IM: CPT

## 2019-10-08 PROCEDURE — 99214 OFFICE O/P EST MOD 30 MIN: CPT | Performed by: INTERNAL MEDICINE

## 2019-10-08 NOTE — ASSESSMENT & PLAN NOTE
Recent studies show stabilization of his renal function  Patient will continue with present surveillance    We will check a basic metabolic profile with his next visit in 4 months

## 2019-10-08 NOTE — PROGRESS NOTES
Assessment/Plan:    DMII (diabetes mellitus, type 2) ([facility])    Lab Results   Component Value Date    HGBA1C 6 5 (H) 09/24/2019    Patient does have a longstanding history of diabetes mellitus type 2, retinopathy, peripheral neuropathy  Patient's blood sugars continuing to show good control with present treatment  Patient will continue present medication  He is no longer being seen by his endocrinologist   He does see a podiatrist for diabetic foot exam and does continue to see his ophthalmologist for eye care  Patient is encouraged to continue to work on his diet watch his intake of fats and cholesterol in total calories and concentrated sweets and to continue to try to loseweight increase his exercise capacity    Wright's esophagus with esophagitis  Patient continues with surveillance for his Wright's esophagus with his gastroenterologist   Patient is had routine EGDs  He continues on the Prilosec as per his gastroenterologist     Coronary artery disease involving native coronary artery of native heart without angina pectoris  History of coronary artery disease, myocardial infarction in the past   Patient continues to follow-up with cardiology  He has no evidence of ischemia other than as 6 heme cardiomyopathy and heart function is been stable with echocardiograms  Benign essential hypertension  Blood pressure showing adequate control  Patient will continue present medications surveillance, we will continue to monitor his renal function  Acute kidney injury [facility])  Recent studies show stabilization of his renal function  Patient will continue with present surveillance    We will check a basic metabolic profile with his next visit in 4 months       Diagnoses and all orders for this visit:    Encounter for immunization  -     influenza vaccine, [id number], high-dose, PF 0 5 mL (FLUZONE HIGH-DOSE)    Type 2 diabetes mellitus with other circulatory complication, without long-term current use of insulin [facility])  -     Basic metabolic panel; Future  -     Hemoglobin A1C; Future    Wright's esophagus with esophagitis    Coronary artery disease involving native coronary artery of native heart without angina pectoris    Benign essential hypertension    Acute kidney injury (HCC)          Subjective:      Patient ID: Jessica Rivers is a 78 y o  male  Patient is a 66-year-old male with a history of multiple medical problems as outlined previously  Patient is here today for follow-up after a four-month period of time  He did have labs performed prior to the visit today and we did discuss the results of his blood sugar and kidney function  Patient states in general he is doing relatively well but still is having some difficulties with ambulation and instability  On neurologic exam patient has no focal changes  States he has seen physical therapy and they are trying to help him with his gait and stability  The following portions of the patient's history were reviewed and updated as appropriate: He  has a past medical history of Acute kidney injury ([address] ), Arthritis, Wright esophagus, BPH with obstruction/lower urinary tract symptoms, CAD (coronary artery disease), Cataract, acquired, Diabetes mellitus ([address] ), Diabetic neuropathy ([address] ), Enlarged prostate with lower urinary tract symptoms (LUTS), Erectile dysfunction, GERD (gastroesophageal reflux disease), Hemoptysis, Hypercholesterolemia, Hypertension, Macular degeneration, Myocardial infarction ([address] ) (1998), OAB (overactive bladder), Testicular hypofunction, Testicular hypogonadism, Umbilical hernia, Urge incontinence of urine, and Wears glasses    He   Patient Active Problem List    Diagnosis Date Noted    Coronary artery disease involving native coronary artery of native heart without angina pectoris 09/09/2019    Ischemic cardiomyopathy 09/09/2019    History of bladder cancer 07/30/2019    Medicare annual wellness visit, subsequent 05/23/2019    Closed fracture of upper end of right fibula 03/11/2019    Obesity (BMI 30 0-34 9) 01/22/2019    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 01/10/2019    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/2019    Type 2 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 ([facility] ) 12/05/2018    Bladder tumor 11/01/2018    Overactive bladder 10/10/2018    Acute kidney injury ([facility] ) 05/30/2018    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    Backache 10/21/2013    Benign essential hypertension 10/11/2012    DMII (diabetes mellitus, type 2) ([facility] ) 10/11/2012    Hyperlipidemia 10/11/2012     He  has a past surgical history that includes pr colonoscopy flx dx w/collj spec when pfrmd (N/A, 4/25/2016); Cystoscopy (2013); Coronary artery bypass graft (07/16/2014); Colonoscopy; Inguinal hernia repair (Bilateral, 2015); Umbilical hernia repair (2012); Esophagogastroduodenoscopy; Tonsillectomy; Photodynamic Therapy; Transurethral resection of prostate (N/A, 12/20/2018); and [place] retrograde pyelogram (12/20/2018)  His family history includes Cancer in his mother; Coronary artery disease in his father; Diabetes in his father; Heart disease in his father; Hyperlipidemia in his father; Hypertension in his father; Other in his mother  He  reports that he quit smoking about 47 years ago  His smoking use included cigarettes  He has a 13 00 pack-year smoking history  He has never used smokeless tobacco  He reports that he drinks about 2 0 standard drinks of alcohol per week  He reports that he does not use drugs    Current Outpatient Medications   Medication Sig Dispense Refill    atorvastatin (LIPITOR) 40 mg tablet Take 1 tablet (40 mg total) by mouth daily 90 tablet 3    irbesartan (AVAPRO) 300 mg tablet Take 1 tablet (300 mg total) by mouth every morning 90 tablet 3    metFORMIN (GLUCOPHAGE-XR) 500 mg 24 hr tablet Take 1 tablet (500 mg total) by mouth 2 (two) times a day with meals 180 tablet 3    metoprolol tartrate (LOPRESSOR) 50 mg tablet Take 1 tablet (50 mg total) by mouth 2 (two) times a day 180 tablet 3    Multiple Vitamins-Minerals (CENTRUM SILVER 50+MEN PO) Take 1 tablet by mouth daily        Multiple Vitamins-Minerals (OCUVITE-LUTEIN PO) Take 1 tablet by mouth 2 (two) times a day        Omega-3 Fatty Acids (FISH OIL) 1200 MG CAPS Take 1 capsule by mouth daily        omeprazole (PriLOSEC) 40 MG capsule Take 40 mg by mouth daily at bedtime        VESICARE 10 MG tablet TAKE ONE TABLET BY MOUTH EVERY DAY 90 tablet 2    aspirin 81 MG tablet Take 81 mg by mouth daily  No current facility-administered medications for this visit  Current Outpatient Medications on File Prior to Visit   Medication Sig    atorvastatin (LIPITOR) 40 mg tablet Take 1 tablet (40 mg total) by mouth daily    irbesartan (AVAPRO) 300 mg tablet Take 1 tablet (300 mg total) by mouth every morning    metFORMIN (GLUCOPHAGE-XR) 500 mg 24 hr tablet Take 1 tablet (500 mg total) by mouth 2 (two) times a day with meals    metoprolol tartrate (LOPRESSOR) 50 mg tablet Take 1 tablet (50 mg total) by mouth 2 (two) times a day    Multiple Vitamins-Minerals (CENTRUM SILVER 50+MEN PO) Take 1 tablet by mouth daily      Multiple Vitamins-Minerals (OCUVITE-LUTEIN PO) Take 1 tablet by mouth 2 (two) times a day      Omega-3 Fatty Acids (FISH OIL) 1200 MG CAPS Take 1 capsule by mouth daily      omeprazole (PriLOSEC) 40 MG capsule Take 40 mg by mouth daily at bedtime      VESICARE 10 MG tablet TAKE ONE TABLET BY MOUTH EVERY DAY    aspirin 81 MG tablet Take 81 mg by mouth daily  No current facility-administered medications on file prior to visit  He is allergic to morphine and related       Review of Systems   Constitutional: Positive for activity change (Admits that he is not as physically active because of some instability with gait)  Negative for appetite change, chills, diaphoresis, fatigue, fever and unexpected weight change  HENT: Negative  Eyes: Negative  Respiratory: Negative  Cardiovascular: Negative  Gastrointestinal: Negative  Endocrine: Negative  Genitourinary: Negative  Musculoskeletal: Positive for arthralgias and gait problem ( patient feels that he does have some instability with his gait)  Negative for back pain, joint swelling, myalgias, neck pain and neck stiffness  Skin: Negative  Allergic/Immunologic: Negative  Neurological: Positive for numbness ( history of diabetic peripheral neuropathy without change)  Negative for dizziness, tremors, seizures, syncope, facial asymmetry, speech difficulty, weakness, light-headedness and headaches  Hematological: Negative  Psychiatric/Behavioral: Negative  Dysphoric mood:  some minor diffuse arthritic aches and pains but nothing new or disabling  Objective:      /72   Pulse 63   Temp 98 9 °F (37 2 °C)   Ht 5' 8" (1 727 m)   Wt 91 9 kg (202 lb 9 6 oz)   SpO2 99%   BMI 30 81 kg/m²          Physical Exam   Constitutional: He is oriented to person, place, and time  He appears well-developed and well-nourished  No distress  Pleasant 70-year-old male who is awake alert no acute distress and oriented x3, overweight   HENT:   Head: Normocephalic and atraumatic  Right Ear: External ear normal    Left Ear: External ear normal    Nose: Nose normal    Mouth/Throat: Oropharynx is clear and moist  No oropharyngeal exudate  Eyes: Pupils are equal, round, and reactive to light  Conjunctivae and EOM are normal  Right eye exhibits no discharge  Left eye exhibits no discharge  No scleral icterus  Neck: Normal range of motion  Neck supple  No JVD present  No tracheal deviation present  No thyromegaly present  Cardiovascular: Normal rate, regular rhythm, normal heart sounds and intact distal pulses  Exam reveals no gallop and no friction rub  No murmur heard  Pulmonary/Chest: Effort normal and breath sounds normal  No stridor   No respiratory distress  He has no wheezes  He has no rales  He exhibits no tenderness  Abdominal: Soft  Bowel sounds are normal  He exhibits no distension and no mass  There is no tenderness  There is no rebound and no guarding  No hernia  Obese   Lymphadenopathy:     He has no cervical adenopathy  Neurological: He is alert and oriented to person, place, and time  He displays normal reflexes  A sensory deficit is present  No cranial nerve deficit  He exhibits normal muscle tone  Coordination normal    Patient did undergo a full neurologic exam today in the office  Cranial nerves 2-12 were grossly intact on examination patient has no focal motor changes  Does have some sensory changes and peripheral neuropathy as per previously  No difficulties with coordination, no decreased motor tone and strength to lower extremities  Skin: Skin is warm and dry  Capillary refill takes less than 2 seconds  No rash noted  He is not diaphoretic  No erythema  No pallor  Psychiatric: He has a normal mood and affect  His behavior is normal  Judgment and thought content normal    Nursing note and vitals reviewed

## 2019-10-08 NOTE — ASSESSMENT & PLAN NOTE
Blood pressure showing adequate control  Patient will continue present medications surveillance, we will continue to monitor his renal function

## 2019-10-08 NOTE — ASSESSMENT & PLAN NOTE
Lab Results   Component Value Date    HGBA1C 6 5 (H) 09/24/2019    Patient does have a longstanding history of diabetes mellitus type 2, retinopathy, peripheral neuropathy  Patient's blood sugars continuing to show good control with present treatment  Patient will continue present medication  He is no longer being seen by his endocrinologist   He does see a podiatrist for diabetic foot exam and does continue to see his ophthalmologist for eye care    Patient is encouraged to continue to work on his diet watch his intake of fats and cholesterol in total calories and concentrated sweets and to continue to try to loseweight increase his exercise capacity

## 2019-10-08 NOTE — ASSESSMENT & PLAN NOTE
Patient continues with surveillance for his Wright's esophagus with his gastroenterologist   Patient is had routine EGDs    He continues on the Prilosec as per his gastroenterologist

## 2019-10-08 NOTE — ASSESSMENT & PLAN NOTE
History of coronary artery disease, myocardial infarction in the past   Patient continues to follow-up with cardiology  He has no evidence of ischemia other than as 6 heme cardiomyopathy and heart function is been stable with echocardiograms

## 2019-10-09 LAB
LEFT EYE DIABETIC RETINOPATHY: NORMAL
RIGHT EYE DIABETIC RETINOPATHY: NORMAL

## 2019-10-29 ENCOUNTER — PROCEDURE VISIT (OUTPATIENT)
Dept: UROLOGY | Facility: MEDICAL CENTER | Age: 79
End: 2019-10-29
Payer: MEDICARE

## 2019-10-29 VITALS
DIASTOLIC BLOOD PRESSURE: 84 MMHG | HEIGHT: 68 IN | SYSTOLIC BLOOD PRESSURE: 150 MMHG | BODY MASS INDEX: 29.55 KG/M2 | WEIGHT: 195 LBS | HEART RATE: 57 BPM

## 2019-10-29 DIAGNOSIS — R82.89 POSITIVE URINARY CYTOLOGY: Primary | ICD-10-CM

## 2019-10-29 DIAGNOSIS — N40.0 BPH WITHOUT OBSTRUCTION/LOWER URINARY TRACT SYMPTOMS: ICD-10-CM

## 2019-10-29 DIAGNOSIS — Z85.51 HISTORY OF BLADDER CANCER: ICD-10-CM

## 2019-10-29 LAB
SL AMB  POCT GLUCOSE, UA: ABNORMAL
SL AMB LEUKOCYTE ESTERASE,UA: ABNORMAL
SL AMB POCT BILIRUBIN,UA: ABNORMAL
SL AMB POCT BLOOD,UA: ABNORMAL
SL AMB POCT CLARITY,UA: CLEAR
SL AMB POCT COLOR,UA: YELLOW
SL AMB POCT KETONES,UA: ABNORMAL
SL AMB POCT NITRITE,UA: ABNORMAL
SL AMB POCT PH,UA: 6
SL AMB POCT SPECIFIC GRAVITY,UA: 1.02
SL AMB POCT URINE PROTEIN: ABNORMAL
SL AMB POCT UROBILINOGEN: ABNORMAL

## 2019-10-29 PROCEDURE — 81003 URINALYSIS AUTO W/O SCOPE: CPT | Performed by: UROLOGY

## 2019-10-29 PROCEDURE — 88112 CYTOPATH CELL ENHANCE TECH: CPT | Performed by: PATHOLOGY

## 2019-10-29 PROCEDURE — 52000 CYSTOURETHROSCOPY: CPT | Performed by: UROLOGY

## 2019-10-29 RX ORDER — GENTAMICIN SULFATE 40 MG/ML
80 INJECTION, SOLUTION INTRAMUSCULAR; INTRAVENOUS ONCE
Status: CANCELLED | OUTPATIENT
Start: 2019-10-29 | End: 2019-10-29

## 2019-10-29 NOTE — PROGRESS NOTES
Assessment/Plan:    Bladder tumor  No visible tumor on cystoscopy today  The patient had a recent abnormal urine cytology  We discussed cystoscopy with biopsy  He would like to check another cytology prior to agreeing to a surgical procedure  Cytology ordered  If positive, proceed to bladder biopsy and retrograde  If negative, cysto in 4 months  The urine cytology obtained today came back positive for malignant cells  I spoke to the patient by phone and we will arrange for bilateral retrograde pyelograms and bladder biopsies  The patient will see me prior to the scheduled procedure  Orders placed  History and physical exam not completed  Diagnoses and all orders for this visit:    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  -     POCT urine dip auto non-scope    Other orders  -     Cancel: gentamicin (GARAMYCIN) 40 mg/mL injection 80 mg          Subjective:      Patient ID: Prem Doyle is a 78 y o  male  HPI  History of bladder cancer: On December 20, 2018 the patient underwent a GreenLight laser transurethral resection of his prostate and resection of a 1 cm papillary tumor on the right lateral wall  That tumor  was high grade  The patient had a recent positive cytology  He notes nocturia x 2 and stream seems to be becoming weaker  He denies other significant urinary symptoms  He denies gross hematuria, urinary tract infections or incontinence  He is taking neither medications nor supplements for his symptoms  Procedures   MALE FLEXIBLE CYSTOSCOPY    Preoperative diagnosis:  History of bladder cancer and positive cytology    Postoperative diagnosis:  No visible recurrent tumor, minor bladder neck contracture    Operation:  Flexible cystoscopy    Surgeon:  Chai Fischer MD,FACS    Anesthesia:  Xylocaine jelly    Procedure:  Patient was brought to the cystoscopy room and positively identified  The patient was prepped and draped in sterile fashion   Ten mL of 1% xylocaine jelly was instilled into the urethra  A penile clamp was applied  The flexible cystoscope was inserted  Findings are as follows:    Penile Urethra:  normal  Bulbar Urethra:  normal  Prostate:  Resected with some residual apical tissue  Minor bladder neck contracture  Bladder:   Bladder Neck:  Mild bladder neck contracture  This was dilated with the flexible cystoscope to [address]  Ureteral orifices:   Not well seen due to hazy urine  Mucosa:   Diffusely hyperemic, but no papillary lesions identified  Trabeculation:  mild   PVR:  Minimal  Other findings: The cystoscope was removed  The patient tolerated the procedure well  Following additional consultation to review the findings with the patient, he was discharged from the office in satisfactory condition  Impression:  No visible recurrent neoplasm  Recent positive cytology  Mild bladder neck contracture  AUA SYMPTOM SCORE      Most Recent Value   AUA SYMPTOM SCORE   How often have you had a sensation of not emptying your bladder completely after you finished urinating? 2   How often have you had to urinate again less than two hours after you finished urinating? 1   How often have you found you stopped and started again several times when you urinate? 3   How often have you found it difficult to postpone urination? 1   How often have you had a weak urinary stream?  3   How often have you had to push or strain to begin urination? 0   How many times did you most typically get up to urinate from the time you went to bed at night until the time you got up in the morning?   2   Quality of Life: If you were to spend the rest of your life with your urinary condition just the way it is now, how would you feel about that?  3   AUA SYMPTOM SCORE  12          The following portions of the patient's history were reviewed and updated as appropriate: allergies, current medications, past family history, past medical history, past social history, past surgical history and problem list     Review of Systems   Constitutional: Negative for activity change and fatigue  Respiratory: Negative for shortness of breath and wheezing  Cardiovascular: Negative for chest pain  Hypertension  Gastrointestinal: Negative for abdominal pain  Endocrine:        Non-insulin dependent diabetes  Genitourinary: Negative for difficulty urinating, dysuria, frequency, hematuria and urgency  Musculoskeletal: Negative for back pain and gait problem  Skin: Negative  Allergic/Immunologic: Negative  Neurological: Negative  Psychiatric/Behavioral: Negative  Objective:      /84 (BP Location: Left arm, Patient Position: Sitting, Cuff Size: Standard)   Pulse 57   Ht 5' 8" (1 727 m)   Wt 88 5 kg (195 lb)   BMI 29 65 kg/m²          Physical Exam   Constitutional: He is oriented to person, place, and time  He appears well-developed and well-nourished  HENT:   Head: Normocephalic and atraumatic  Eyes: EOM are normal    Neck: Normal range of motion  Pulmonary/Chest: Effort normal    Genitourinary: Penis normal    Musculoskeletal: Normal range of motion  Neurological: He is alert and oriented to person, place, and time  Skin: Skin is warm and dry  Psychiatric: He has a normal mood and affect   His behavior is normal  Judgment and thought content normal

## 2019-10-29 NOTE — ASSESSMENT & PLAN NOTE
No visible tumor on cystoscopy today  The patient had a recent abnormal urine cytology  We discussed cystoscopy with biopsy  He would like to check another cytology prior to agreeing to a surgical procedure  Cytology ordered  If positive, proceed to bladder biopsy and retrograde  If negative, cysto in 4 months

## 2019-10-29 NOTE — LETTER
October 29, 2019     Libby Arredondo DO  [address]  [address] [place] [address] [place] [address]    Patient: Harry Kaiser   YOB: 1940   Date of Visit: 10/29/2019       Dear [facility]: Thank you for referring Elie Madrid to me for evaluation  Below are my notes for this consultation  If you have questions, please do not hesitate to call me  I look forward to following your patient along with you  Sincerely,        Mami Okeefe MD        CC: No Recipients  Mami Okeefe MD  10/29/2019  1:11 PM  Incomplete  Assessment/Plan:    Bladder tumor  No visible tumor on cystoscopy today  The patient had a recent abnormal urine cytology  We discussed cystoscopy with biopsy  He would like to check another cytology prior to agreeing to a surgical procedure  Cytology ordered  If positive, proceed to bladder biopsy and retrograde  If negative, cysto in 4 months  Diagnoses and all orders for this visit:    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  -     POCT urine dip auto non-scope    Other orders  -     Cancel: gentamicin (GARAMYCIN) 40 mg/mL injection 80 mg          Subjective:      Patient ID: Harry Kaiser is a 78 y o  male  HPI  History of bladder cancer: On December 20, 2018 the patient underwent a GreenLight laser transurethral resection of his prostate and resection of a 1 cm papillary tumor on the right lateral wall  That tumor  was high grade  The patient had a recent positive cytology  He notes nocturia x 2 and stream seems to be becoming weaker  He denies other significant urinary symptoms  He denies gross hematuria, urinary tract infections or incontinence  He is taking neither medications nor supplements for his symptoms         Procedures   MALE FLEXIBLE CYSTOSCOPY    Preoperative diagnosis:  History of bladder cancer and positive cytology    Postoperative diagnosis:  No visible recurrent tumor, minor bladder neck contracture    Operation:  Flexible cystoscopy    Surgeon:  Michalene Simmonds, MD,FACS    Anesthesia:  Xylocaine jelly    Procedure:  Patient was brought to the cystoscopy room and positively identified  The patient was prepped and draped in sterile fashion  Ten mL of 1% xylocaine jelly was instilled into the urethra  A penile clamp was applied  The flexible cystoscope was inserted  Findings are as follows:    Penile Urethra:  normal  Bulbar Urethra:  normal  Prostate:  Resected with some residual apical tissue  Minor bladder neck contracture  Bladder:   Bladder Neck:  Mild bladder neck contracture  This was dilated with the flexible cystoscope to [address] [place]  Ureteral orifices:   Not well seen due to hazy urine  Mucosa:   Diffusely hyperemic, but no papillary lesions identified  Trabeculation:  mild   PVR:  Minimal  Other findings: The cystoscope was removed  The patient tolerated the procedure well  Following additional consultation to review the findings with the patient, he was discharged from the office in satisfactory condition  Impression:  No visible recurrent neoplasm  Recent positive cytology  Mild bladder neck contracture  AUA SYMPTOM SCORE      Most Recent Value   AUA SYMPTOM SCORE   How often have you had a sensation of not emptying your bladder completely after you finished urinating? 2   How often have you had to urinate again less than two hours after you finished urinating? 1   How often have you found you stopped and started again several times when you urinate? 3   How often have you found it difficult to postpone urination? 1   How often have you had a weak urinary stream?  3   How often have you had to push or strain to begin urination? 0   How many times did you most typically get up to urinate from the time you went to bed at night until the time you got up in the morning?   2   Quality of Life: If you were to spend the rest of your life with your urinary condition just the way it is now, how would you feel about that?  3   AUA SYMPTOM SCORE  12          The following portions of the patient's history were reviewed and updated as appropriate: allergies, current medications, past family history, past medical history, past social history, past surgical history and problem list     Review of Systems   Constitutional: Negative for activity change and fatigue  Respiratory: Negative for shortness of breath and wheezing  Cardiovascular: Negative for chest pain  Hypertension  Gastrointestinal: Negative for abdominal pain  Endocrine:        Non-insulin dependent diabetes  Genitourinary: Negative for difficulty urinating, dysuria, frequency, hematuria and urgency  Musculoskeletal: Negative for back pain and gait problem  Skin: Negative  Allergic/Immunologic: Negative  Neurological: Negative  Psychiatric/Behavioral: Negative  Objective:      /84 (BP Location: Left arm, Patient Position: Sitting, Cuff Size: Standard)   Pulse 57   Ht 5' 8" (1 727 m)   Wt 88 5 kg (195 lb)   BMI 29 65 kg/m²           Physical Exam   Constitutional: He is oriented to person, place, and time  He appears well-developed and well-nourished  HENT:   Head: Normocephalic and atraumatic  Eyes: EOM are normal    Neck: Normal range of motion  Pulmonary/Chest: Effort normal    Genitourinary: Penis normal    Musculoskeletal: Normal range of motion  Neurological: He is alert and oriented to person, place, and time  Skin: Skin is warm and dry  Psychiatric: He has a normal mood and affect  His behavior is normal  Judgment and thought content normal            Carmen Birmingham MD  10/29/2019 11:39 AM  Sign at close encounter  Assessment/Plan:    Bladder tumor  No visible tumor on cystoscopy today  The patient had a recent abnormal urine cytology  We discussed cystoscopy with biopsy    He would like to check another cytology prior to agreeing to a surgical procedure  Cytology ordered  If positive, proceed to bladder biopsy and retrograde  If negative, cysto in 4 months  Diagnoses and all orders for this visit:    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  -     POCT urine dip auto non-scope    Other orders  -     Cancel: gentamicin (GARAMYCIN) 40 mg/mL injection 80 mg          Subjective:      Patient ID: Connor Joe is a 78 y o  male  HPI  History of bladder cancer: On December 20, 2018 the patient underwent a GreenLight laser transurethral resection of his prostate and resection of a 1 cm papillary tumor on the right lateral wall  That tumor  was high grade  The patient had a recent positive cytology  He notes nocturia x 2 and stream seems to be becoming weaker  He denies other significant urinary symptoms  He denies gross hematuria, urinary tract infections or incontinence  He is taking neither medications nor supplements for his symptoms  Procedures   MALE FLEXIBLE CYSTOSCOPY    Preoperative diagnosis:  History of bladder cancer and positive cytology    Postoperative diagnosis:  No visible recurrent tumor, minor bladder neck contracture    Operation:  Flexible cystoscopy    Surgeon:  Morenita Pratt MD,FACS    Anesthesia:  Xylocaine jelly    Procedure:  Patient was brought to the cystoscopy room and positively identified  The patient was prepped and draped in sterile fashion  Ten mL of 1% xylocaine jelly was instilled into the urethra  A penile clamp was applied  The flexible cystoscope was inserted  Findings are as follows:    Penile Urethra:  normal  Bulbar Urethra:  normal  Prostate:  Resected with some residual apical tissue  Minor bladder neck contracture  Bladder:   Bladder Neck:  Mild bladder neck contracture  This was dilated with the flexible cystoscope to 15 [employer]  Ureteral orifices:   Not well seen due to hazy urine    Mucosa:   Diffusely hyperemic, but no papillary lesions identified  Trabeculation:  mild   PVR:  Minimal  Other findings: The cystoscope was removed  The patient tolerated the procedure well  Following additional consultation to review the findings with the patient, he was discharged from the office in satisfactory condition  Impression:  No visible recurrent neoplasm  Recent positive cytology  Mild bladder neck contracture  AUA SYMPTOM SCORE      Most Recent Value   AUA SYMPTOM SCORE   How often have you had a sensation of not emptying your bladder completely after you finished urinating? 2   How often have you had to urinate again less than two hours after you finished urinating? 1   How often have you found you stopped and started again several times when you urinate? 3   How often have you found it difficult to postpone urination? 1   How often have you had a weak urinary stream?  3   How often have you had to push or strain to begin urination? 0   How many times did you most typically get up to urinate from the time you went to bed at night until the time you got up in the morning? 2   Quality of Life: If you were to spend the rest of your life with your urinary condition just the way it is now, how would you feel about that?  3   AUA SYMPTOM SCORE  12          The following portions of the patient's history were reviewed and updated as appropriate: allergies, current medications, past family history, past medical history, past social history, past surgical history and problem list     Review of Systems   Constitutional: Negative for activity change and fatigue  Respiratory: Negative for shortness of breath and wheezing  Cardiovascular: Negative for chest pain  Hypertension  Gastrointestinal: Negative for abdominal pain  Endocrine:        Non-insulin dependent diabetes  Genitourinary: Negative for difficulty urinating, dysuria, frequency, hematuria and urgency  Musculoskeletal: Negative for back pain and gait problem  Skin: Negative  Allergic/Immunologic: Negative  Neurological: Negative  Psychiatric/Behavioral: Negative            Objective:      /84 (BP Location: Left arm, Patient Position: Sitting, Cuff Size: Standard)   Pulse 57   Ht 5' 8" (1 727 m)   Wt 88 5 kg (195 lb)   BMI 29 65 kg/m²           Physical Exam

## 2019-11-04 ENCOUNTER — TELEPHONE (OUTPATIENT)
Dept: UROLOGY | Facility: MEDICAL CENTER | Age: 79
End: 2019-11-04

## 2019-11-04 RX ORDER — CEFAZOLIN SODIUM 2 G/50ML
2000 SOLUTION INTRAVENOUS ONCE
Status: CANCELLED | OUTPATIENT
Start: 2019-12-05 | End: 2019-11-04

## 2019-11-04 NOTE — TELEPHONE ENCOUNTER
----- Message from Jackeline Lovelace MD sent at 11/4/2019  1:50 PM EST -----  Please call the patient regarding his abnormal result  I spoke to the patient regarding his abnormal cytology  At this point, he should have bilateral retrogrades and bladder biopsies under anesthesia  The patient understands this  At this point, he would like us to schedule the procedure, give him an appointment to see me preoperatively for paperwork and also give him an appointment for a postoperative checkup and review of the pathology  I have placed a case request   I will do the history and physical exam when I see him preop

## 2019-11-05 PROBLEM — R82.89 POSITIVE URINARY CYTOLOGY: Status: ACTIVE | Noted: 2019-11-05

## 2019-11-15 ENCOUNTER — OFFICE VISIT (OUTPATIENT)
Dept: UROLOGY | Facility: MEDICAL CENTER | Age: 79
End: 2019-11-15
Payer: MEDICARE

## 2019-11-15 VITALS
HEIGHT: 68 IN | DIASTOLIC BLOOD PRESSURE: 80 MMHG | HEART RATE: 76 BPM | BODY MASS INDEX: 29.65 KG/M2 | SYSTOLIC BLOOD PRESSURE: 140 MMHG

## 2019-11-15 DIAGNOSIS — Z85.51 HISTORY OF BLADDER CANCER: Primary | ICD-10-CM

## 2019-11-15 LAB
SL AMB  POCT GLUCOSE, UA: NORMAL
SL AMB LEUKOCYTE ESTERASE,UA: NORMAL
SL AMB POCT BILIRUBIN,UA: NORMAL
SL AMB POCT BLOOD,UA: NORMAL
SL AMB POCT CLARITY,UA: CLEAR
SL AMB POCT COLOR,UA: YELLOW
SL AMB POCT KETONES,UA: NORMAL
SL AMB POCT NITRITE,UA: NORMAL
SL AMB POCT PH,UA: 7
SL AMB POCT SPECIFIC GRAVITY,UA: 1.01
SL AMB POCT URINE PROTEIN: NORMAL
SL AMB POCT UROBILINOGEN: 0.2

## 2019-11-15 PROCEDURE — 81003 URINALYSIS AUTO W/O SCOPE: CPT | Performed by: UROLOGY

## 2019-11-15 PROCEDURE — 99215 OFFICE O/P EST HI 40 MIN: CPT | Performed by: UROLOGY

## 2019-11-15 NOTE — PROGRESS NOTES
Assessment/Plan:    Positive urinary cytology  The patient is scheduled for cystoscopy, bladder biopsies and retrograde pyelograms with evaluation of the upper tracts  Diagnoses and all orders for this visit:    History of bladder cancer  -     POCT urine dip auto non-scope          Subjective:      Patient ID: Mercedes Luke is a 78 y o  male  HPI  Positive urine cytology - Bladder cancer hx:    Pt returns for H and P  On December 20, 2018 the patient underwent a GreenLight laser transurethral resection of his prostate and resection of 1 cm bladder tumor on the right lateral wall  That tumor was high grade  The patient has had several recent positive urine cytologies  Cystoscopy several weeks ago showed no visible tumor  The patient is being admitted for cystoscopy, bladder biopsies and evaluation of the upper tracts  AUA SYMPTOM SCORE      Most Recent Value   AUA SYMPTOM SCORE   How often have you had a sensation of not emptying your bladder completely after you finished urinating? 2   How often have you had to urinate again less than two hours after you finished urinating? 1   How often have you found you stopped and started again several times when you urinate? 2   How often have you found it difficult to postpone urination? 1   How often have you had a weak urinary stream?  3   How often have you had to push or strain to begin urination? 1   How many times did you most typically get up to urinate from the time you went to bed at night until the time you got up in the morning? 2   Quality of Life: If you were to spend the rest of your life with your urinary condition just the way it is now, how would you feel about that?  3   AUA SYMPTOM SCORE  12            Hold ASA for a week  Accompanied by wife        The following portions of the patient's history were reviewed and updated as appropriate: allergies, current medications, past family history, past medical history, past social history, past surgical history and problem list     Review of Systems    Constitutional: Negative for activity change and fatigue  Respiratory: Negative for shortness of breath and wheezing  Cardiovascular: Negative for chest pain  Hypertension  Gastrointestinal: Negative for abdominal pain  Endocrine:        Non-insulin dependent diabetes  Genitourinary: Negative for difficulty urinating, dysuria, frequency, hematuria and urgency  Musculoskeletal: Negative for back pain and gait problem  Skin: Negative  Allergic/Immunologic: Negative  Neurological: Negative  Psychiatric/Behavioral: Negative  Objective:      /90 (BP Location: Left arm, Patient Position: Sitting, Cuff Size: Adult)   Pulse 76   Ht 5' 8" (1 727 m)   BMI 29 65 kg/m²          Physical Exam   Constitutional: He is oriented to person, place, and time  He appears well-developed and well-nourished  HENT:   Head: Normocephalic and atraumatic  Eyes: EOM are normal    Neck: Normal range of motion  Neck supple  Cardiovascular: Normal rate, regular rhythm and normal heart sounds  Pulmonary/Chest: Effort normal and breath sounds normal    Abdominal: Soft  There is no tenderness  Musculoskeletal: Normal range of motion  Neurological: He is alert and oriented to person, place, and time  Skin: Skin is warm and dry  Psychiatric: He has a normal mood and affect   His behavior is normal  Judgment and thought content normal

## 2019-11-25 RX ORDER — SIMVASTATIN 20 MG
20 TABLET ORAL
Status: ON HOLD | COMMUNITY
End: 2020-04-16 | Stop reason: ALTCHOICE

## 2019-11-25 NOTE — PRE-PROCEDURE INSTRUCTIONS
Pre-Surgery Instructions:   Medication Instructions    aspirin 81 MG tablet Instructed patient per Anesthesia Guidelines   irbesartan (AVAPRO) 300 mg tablet Instructed patient per Anesthesia Guidelines   metFORMIN (GLUCOPHAGE-XR) 500 mg 24 hr tablet Instructed patient per Anesthesia Guidelines   metoprolol tartrate (LOPRESSOR) 50 mg tablet Instructed patient per Anesthesia Guidelines   Multiple Vitamins-Minerals (CENTRUM SILVER 50+MEN PO) Instructed patient per Anesthesia Guidelines   Multiple Vitamins-Minerals (OCUVITE-LUTEIN PO) Instructed patient per Anesthesia Guidelines   Omega-3 Fatty Acids (FISH OIL) 1200 MG CAPS Instructed patient per Anesthesia Guidelines   omeprazole (PriLOSEC) 40 MG capsule Instructed patient per Anesthesia Guidelines   simvastatin (ZOCOR) 20 mg tablet Instructed patient per Anesthesia Guidelines   VESICARE 10 MG tablet Instructed patient per Anesthesia Guidelines  Instructed to take Metoprolol and Omeprazole with sip of water the morning of surgery  Hold Irbesartan 12/4 and 12/5 per anesthesia guidelines  No aspirin, NSAIDs, vitamins, or supplements 1 week before surgery

## 2019-11-26 ENCOUNTER — APPOINTMENT (OUTPATIENT)
Dept: LAB | Facility: CLINIC | Age: 79
End: 2019-11-26
Payer: MEDICARE

## 2019-11-26 DIAGNOSIS — Z85.51 HISTORY OF BLADDER CANCER: ICD-10-CM

## 2019-11-26 LAB
ALBUMIN SERPL BCP-MCNC: 3.9 G/DL (ref 3.5–5)
ALP SERPL-CCNC: 75 U/L (ref 46–116)
ALT SERPL W P-5'-P-CCNC: 39 U/L (ref 12–78)
ANION GAP SERPL CALCULATED.3IONS-SCNC: 9 MMOL/L (ref 4–13)
APTT PPP: 26 SECONDS (ref 23–37)
AST SERPL W P-5'-P-CCNC: 21 U/L (ref 5–45)
BASOPHILS # BLD AUTO: 0.06 THOUSANDS/ΜL (ref 0–0.1)
BASOPHILS NFR BLD AUTO: 1 % (ref 0–1)
BILIRUB SERPL-MCNC: 0.9 MG/DL (ref 0.2–1)
BUN SERPL-MCNC: 24 MG/DL (ref 5–25)
CALCIUM SERPL-MCNC: 9 MG/DL (ref 8.3–10.1)
CHLORIDE SERPL-SCNC: 107 MMOL/L (ref 100–108)
CO2 SERPL-SCNC: 24 MMOL/L (ref 21–32)
CREAT SERPL-MCNC: 1.47 MG/DL (ref 0.6–1.3)
EOSINOPHIL # BLD AUTO: 0.11 THOUSAND/ΜL (ref 0–0.61)
EOSINOPHIL NFR BLD AUTO: 1 % (ref 0–6)
ERYTHROCYTE [DISTWIDTH] IN BLOOD BY AUTOMATED COUNT: 14.3 % (ref 11.6–15.1)
GFR SERPL CREATININE-BSD FRML MDRD: 45 ML/MIN/1.73SQ M
GLUCOSE SERPL-MCNC: 160 MG/DL (ref 65–140)
HCT VFR BLD AUTO: 44.4 % (ref 36.5–49.3)
HGB BLD-MCNC: 14.7 G/DL (ref 12–17)
IMM GRANULOCYTES # BLD AUTO: 0.05 THOUSAND/UL (ref 0–0.2)
IMM GRANULOCYTES NFR BLD AUTO: 0 % (ref 0–2)
INR PPP: 1.04 (ref 0.84–1.19)
LYMPHOCYTES # BLD AUTO: 1.81 THOUSANDS/ΜL (ref 0.6–4.47)
LYMPHOCYTES NFR BLD AUTO: 16 % (ref 14–44)
MCH RBC QN AUTO: 31.2 PG (ref 26.8–34.3)
MCHC RBC AUTO-ENTMCNC: 33.1 G/DL (ref 31.4–37.4)
MCV RBC AUTO: 94 FL (ref 82–98)
MONOCYTES # BLD AUTO: 1.07 THOUSAND/ΜL (ref 0.17–1.22)
MONOCYTES NFR BLD AUTO: 9 % (ref 4–12)
NEUTROPHILS # BLD AUTO: 8.36 THOUSANDS/ΜL (ref 1.85–7.62)
NEUTS SEG NFR BLD AUTO: 73 % (ref 43–75)
NRBC BLD AUTO-RTO: 0 /100 WBCS
PLATELET # BLD AUTO: 182 THOUSANDS/UL (ref 149–390)
PMV BLD AUTO: 13.1 FL (ref 8.9–12.7)
POTASSIUM SERPL-SCNC: 4.1 MMOL/L (ref 3.5–5.3)
PROT SERPL-MCNC: 7.4 G/DL (ref 6.4–8.2)
PROTHROMBIN TIME: 13.2 SECONDS (ref 11.6–14.5)
RBC # BLD AUTO: 4.71 MILLION/UL (ref 3.88–5.62)
SODIUM SERPL-SCNC: 140 MMOL/L (ref 136–145)
WBC # BLD AUTO: 11.46 THOUSAND/UL (ref 4.31–10.16)

## 2019-11-26 PROCEDURE — 85730 THROMBOPLASTIN TIME PARTIAL: CPT

## 2019-11-26 PROCEDURE — 36415 COLL VENOUS BLD VENIPUNCTURE: CPT

## 2019-11-26 PROCEDURE — 80053 COMPREHEN METABOLIC PANEL: CPT

## 2019-11-26 PROCEDURE — 85610 PROTHROMBIN TIME: CPT

## 2019-11-26 PROCEDURE — 85025 COMPLETE CBC W/AUTO DIFF WBC: CPT

## 2019-12-04 ENCOUNTER — ANESTHESIA EVENT (OUTPATIENT)
Dept: PERIOP | Facility: HOSPITAL | Age: 79
End: 2019-12-04
Payer: MEDICARE

## 2019-12-04 PROCEDURE — NC001 PR NO CHARGE: Performed by: UROLOGY

## 2019-12-04 NOTE — H&P
HISTORY AND PHYSICAL  ? ? Patient Name: Nathan Bailey  Patient MRN: [id number]  Attending Provider: Colette Max MD  Service: Urology  Chief Complaint    Positive urinary cytology    HPI   Nathan Bailey is a 78 y o  male with history of bladder cancer and positive urinary cytology  I plan cystoscopy with bladder biopsies, biopsy prostatic urethra, bilateral retrograde pyelography, collection of urinary cytology and possible ureteroscopy  Potential risks and complications discussed, and informed consent was given by the patient  Medications  Meds/Allergies     No current facility-administered medications for this encounter  Cannot display prior to admission medications because the patient has not been admitted in this contact  No current facility-administered medications for this encounter  Current Outpatient Medications:     aspirin 81 MG tablet, Take 81 mg by mouth daily  , Disp: , Rfl:     irbesartan (AVAPRO) 300 mg tablet, Take 1 tablet (300 mg total) by mouth every morning, Disp: 90 tablet, Rfl: 3    metFORMIN (GLUCOPHAGE-XR) 500 mg 24 hr tablet, Take 1 tablet (500 mg total) by mouth 2 (two) times a day with meals, Disp: 180 tablet, Rfl: 3    metoprolol tartrate (LOPRESSOR) 50 mg tablet, Take 1 tablet (50 mg total) by mouth 2 (two) times a day, Disp: 180 tablet, Rfl: 3    Multiple Vitamins-Minerals (CENTRUM SILVER 50+MEN PO), Take 1 tablet by mouth daily  , Disp: , Rfl:     Multiple Vitamins-Minerals (OCUVITE-LUTEIN PO), Take 1 tablet by mouth 2 (two) times a day  , Disp: , Rfl:     Omega-3 Fatty Acids (FISH OIL) 1200 MG CAPS, Take 2 capsules by mouth daily , Disp: , Rfl:     omeprazole (PriLOSEC) 40 MG capsule, Take 40 mg by mouth daily at bedtime  , Disp: , Rfl:     simvastatin (ZOCOR) 20 mg tablet, Take 20 mg by mouth daily at bedtime, Disp: , Rfl:     VESICARE 10 MG tablet, TAKE ONE TABLET BY MOUTH EVERY DAY, Disp: 90 tablet, Rfl: 2  Review of Systems  10 point review of systems negative except as noted in HPI  Allergies  Allergies   Allergen Reactions    Morphine And Related Other (See Comments)     While having an MI patient received morphine and had adverse reaction but doesn't know what happened  PMH  Past Medical History:   Diagnosis Date    Acute kidney injury ([facility] [address] )     Arthritis     Hands    Wright esophagus     BPH with obstruction/lower urinary tract symptoms     CAD (coronary artery disease)     Last assessed 09/16/15    Cataract, acquired     Last assessed 10/10/17    Diabetes mellitus ([facility] [address] )     NIDDM    Diabetic neuropathy ([facility] [address] )     Feet    Enlarged prostate with lower urinary tract symptoms (LUTS)     Last assessed 10/10/17    Erectile dysfunction     GERD (gastroesophageal reflux disease)     Last assessed 10/10/17    Hemoptysis     Last assessed 03/14/16    Hypercholesterolemia     Last assessed 10/10/17    Hypertension     Last assessed 10/10/17    Macular degeneration     Right eye is particularly affected    Myocardial infarction ([facility] ) 1998    OAB (overactive bladder)     Testicular hypofunction     Testicular hypogonadism     Last assessed 58/77/00    Umbilical hernia     Last assessed 06/18/14    Urge incontinence of urine     Wears glasses      Past surgical history  Past Surgical History:   Procedure Laterality Date    COLONOSCOPY      CORONARY ARTERY BYPASS GRAFT  07/16/2014    ABG x 4 LIMA to LAD,SVG to diagnoal 2 SVG to OM-1, SVG to PDA, resection of partial plerual mass    CYSTOSCOPY  2013    ESOPHAGOGASTRODUODENOSCOPY      FL RETROGRADE PYELOGRAM  12/20/2018    INGUINAL HERNIA REPAIR Bilateral 2015    PHOTODYNAMIC THERAPY      For Barretts esophagus    NJ COLONOSCOPY FLX DX W/COLLJ SPEC WHEN PFRMD N/A 4/25/2016    Procedure: COLONOSCOPY;  Surgeon: Kay Lennox, MD;  Location: [facility];   Service: Gastroenterology    TONSILLECTOMY      TRANSURETHRAL RESECTION OF PROSTATE N/A 12/20/2018    Procedure: Sam Ear SELECTIVE VAPORIZATION OF PROSTATE, B/L RETROGRADE PYELOGRAM, TURBT;  Surgeon: Nu Lemos MD;  Location: [facility];  Service: Urology    UMBILICAL HERNIA REPAIR       Social history  Social History     Tobacco Use    Smoking status: Former Smoker     Packs/day:      Years:      Pack years:      Types: Cigarettes     Last attempt to quit: 1972     Years since quittin 9    Smokeless tobacco: Never Used   Substance Use Topics    Alcohol use: Yes     Alcohol/week: 2 0 standard drinks     Types: 1 Glasses of wine, 1 Cans of beer per week     Frequency: 2-3 times a week     Drinks per session: 1 or 2     Comment: 1 drink daily- a mixed drink or wine    Drug use: No     ?  Physical Exam      General appearance: alert and oriented, in no acute distress  Head: Normocephalic, without obvious abnormality, atraumatic  Neck: supple, symmetrical, trachea midline  Back: symmetric, no curvature  ROM normal  No CVA tenderness    Lungs: clear to auscultation bilaterally  Heart: regular rate and rhythm  Abdomen: soft, non-tender; bowel sounds normal; no masses,  no organomegaly  Male genitalia: normal  Extremities: extremities normal, warm and well-perfused; no cyanosis, clubbing, or edema  Neurologic: Grossly normal     Nu Lemos MD

## 2019-12-05 ENCOUNTER — APPOINTMENT (OUTPATIENT)
Dept: RADIOLOGY | Facility: HOSPITAL | Age: 79
End: 2019-12-05
Payer: MEDICARE

## 2019-12-05 ENCOUNTER — TELEPHONE (OUTPATIENT)
Dept: OTHER | Facility: HOSPITAL | Age: 79
End: 2019-12-05

## 2019-12-05 ENCOUNTER — ANESTHESIA (OUTPATIENT)
Dept: PERIOP | Facility: HOSPITAL | Age: 79
End: 2019-12-05
Payer: MEDICARE

## 2019-12-05 ENCOUNTER — HOSPITAL ENCOUNTER (OUTPATIENT)
Facility: HOSPITAL | Age: 79
Setting detail: OUTPATIENT SURGERY
Discharge: HOME | End: 2019-12-05
Attending: UROLOGY | Admitting: UROLOGY
Payer: MEDICARE

## 2019-12-05 VITALS
TEMPERATURE: 98.2 F | OXYGEN SATURATION: 98 % | SYSTOLIC BLOOD PRESSURE: 192 MMHG | RESPIRATION RATE: 16 BRPM | BODY MASS INDEX: 29.55 KG/M2 | DIASTOLIC BLOOD PRESSURE: 78 MMHG | HEIGHT: 68 IN | HEART RATE: 82 BPM | WEIGHT: 195 LBS

## 2019-12-05 DIAGNOSIS — R82.89 POSITIVE URINARY CYTOLOGY: ICD-10-CM

## 2019-12-05 LAB
GLUCOSE SERPL-MCNC: 107 MG/DL (ref 65–140)
GLUCOSE SERPL-MCNC: 128 MG/DL (ref 65–140)

## 2019-12-05 PROCEDURE — 52500 TRURL RESECTION BLADDER NECK: CPT | Performed by: UROLOGY

## 2019-12-05 PROCEDURE — 52235 CYSTOSCOPY AND TREATMENT: CPT | Performed by: UROLOGY

## 2019-12-05 PROCEDURE — 74420 UROGRAPHY RTRGR +-KUB: CPT

## 2019-12-05 PROCEDURE — 82948 REAGENT STRIP/BLOOD GLUCOSE: CPT

## 2019-12-05 PROCEDURE — 88307 TISSUE EXAM BY PATHOLOGIST: CPT | Performed by: PATHOLOGY

## 2019-12-05 PROCEDURE — 88305 TISSUE EXAM BY PATHOLOGIST: CPT | Performed by: PATHOLOGY

## 2019-12-05 PROCEDURE — 51720 TREATMENT OF BLADDER LESION: CPT | Performed by: UROLOGY

## 2019-12-05 PROCEDURE — NC001 PR NO CHARGE: Performed by: UROLOGY

## 2019-12-05 RX ORDER — MAGNESIUM HYDROXIDE 1200 MG/15ML
LIQUID ORAL AS NEEDED
Status: DISCONTINUED | OUTPATIENT
Start: 2019-12-05 | End: 2019-12-05 | Stop reason: HOSPADM

## 2019-12-05 RX ORDER — PHENAZOPYRIDINE HYDROCHLORIDE 200 MG/1
200 TABLET, FILM COATED ORAL ONCE AS NEEDED
Status: DISCONTINUED | OUTPATIENT
Start: 2019-12-05 | End: 2019-12-05 | Stop reason: HOSPADM

## 2019-12-05 RX ORDER — ONDANSETRON 2 MG/ML
INJECTION INTRAMUSCULAR; INTRAVENOUS AS NEEDED
Status: DISCONTINUED | OUTPATIENT
Start: 2019-12-05 | End: 2019-12-05 | Stop reason: SURG

## 2019-12-05 RX ORDER — HYDROMORPHONE HCL/PF 1 MG/ML
0.5 SYRINGE (ML) INJECTION
Status: DISCONTINUED | OUTPATIENT
Start: 2019-12-05 | End: 2019-12-05 | Stop reason: HOSPADM

## 2019-12-05 RX ORDER — ONDANSETRON 2 MG/ML
4 INJECTION INTRAMUSCULAR; INTRAVENOUS ONCE AS NEEDED
Status: DISCONTINUED | OUTPATIENT
Start: 2019-12-05 | End: 2019-12-05 | Stop reason: HOSPADM

## 2019-12-05 RX ORDER — HYDRALAZINE HYDROCHLORIDE 20 MG/ML
5 INJECTION INTRAMUSCULAR; INTRAVENOUS ONCE
Status: COMPLETED | OUTPATIENT
Start: 2019-12-05 | End: 2019-12-05

## 2019-12-05 RX ORDER — SODIUM CHLORIDE 9 MG/ML
125 INJECTION, SOLUTION INTRAVENOUS CONTINUOUS
Status: DISCONTINUED | OUTPATIENT
Start: 2019-12-05 | End: 2019-12-05 | Stop reason: HOSPADM

## 2019-12-05 RX ORDER — FENTANYL CITRATE/PF 50 MCG/ML
50 SYRINGE (ML) INJECTION
Status: DISCONTINUED | OUTPATIENT
Start: 2019-12-05 | End: 2019-12-05 | Stop reason: HOSPADM

## 2019-12-05 RX ORDER — METOPROLOL TARTRATE 5 MG/5ML
2.5 INJECTION INTRAVENOUS ONCE
Status: COMPLETED | OUTPATIENT
Start: 2019-12-05 | End: 2019-12-05

## 2019-12-05 RX ORDER — MIDAZOLAM HYDROCHLORIDE 2 MG/2ML
INJECTION, SOLUTION INTRAMUSCULAR; INTRAVENOUS AS NEEDED
Status: DISCONTINUED | OUTPATIENT
Start: 2019-12-05 | End: 2019-12-05 | Stop reason: SURG

## 2019-12-05 RX ORDER — MEPERIDINE HYDROCHLORIDE 50 MG/ML
12.5 INJECTION INTRAMUSCULAR; INTRAVENOUS; SUBCUTANEOUS ONCE AS NEEDED
Status: DISCONTINUED | OUTPATIENT
Start: 2019-12-05 | End: 2019-12-05 | Stop reason: HOSPADM

## 2019-12-05 RX ORDER — CEFAZOLIN SODIUM 2 G/50ML
2000 SOLUTION INTRAVENOUS ONCE
Status: COMPLETED | OUTPATIENT
Start: 2019-12-05 | End: 2019-12-05

## 2019-12-05 RX ORDER — PROPOFOL 10 MG/ML
INJECTION, EMULSION INTRAVENOUS AS NEEDED
Status: DISCONTINUED | OUTPATIENT
Start: 2019-12-05 | End: 2019-12-05 | Stop reason: SURG

## 2019-12-05 RX ORDER — ONDANSETRON 2 MG/ML
4 INJECTION INTRAMUSCULAR; INTRAVENOUS EVERY 6 HOURS PRN
Status: DISCONTINUED | OUTPATIENT
Start: 2019-12-05 | End: 2019-12-05 | Stop reason: HOSPADM

## 2019-12-05 RX ORDER — FENTANYL CITRATE 50 UG/ML
INJECTION, SOLUTION INTRAMUSCULAR; INTRAVENOUS AS NEEDED
Status: DISCONTINUED | OUTPATIENT
Start: 2019-12-05 | End: 2019-12-05 | Stop reason: SURG

## 2019-12-05 RX ORDER — ACETAMINOPHEN 325 MG/1
650 TABLET ORAL EVERY 6 HOURS PRN
Status: DISCONTINUED | OUTPATIENT
Start: 2019-12-05 | End: 2019-12-05 | Stop reason: HOSPADM

## 2019-12-05 RX ADMIN — SODIUM CHLORIDE: 0.9 INJECTION, SOLUTION INTRAVENOUS at 15:02

## 2019-12-05 RX ADMIN — FENTANYL CITRATE 50 MCG: 50 INJECTION, SOLUTION INTRAMUSCULAR; INTRAVENOUS at 16:06

## 2019-12-05 RX ADMIN — CEFAZOLIN SODIUM 2000 MG: 2 SOLUTION INTRAVENOUS at 15:02

## 2019-12-05 RX ADMIN — MIDAZOLAM 2 MG: 1 INJECTION INTRAMUSCULAR; INTRAVENOUS at 15:03

## 2019-12-05 RX ADMIN — PROPOFOL 180 MG: 10 INJECTION, EMULSION INTRAVENOUS at 15:13

## 2019-12-05 RX ADMIN — SODIUM CHLORIDE: 0.9 INJECTION, SOLUTION INTRAVENOUS at 15:48

## 2019-12-05 RX ADMIN — ONDANSETRON 4 MG: 2 INJECTION INTRAMUSCULAR; INTRAVENOUS at 15:48

## 2019-12-05 RX ADMIN — HYDRALAZINE HYDROCHLORIDE 5 MG: 20 INJECTION INTRAMUSCULAR; INTRAVENOUS at 16:54

## 2019-12-05 RX ADMIN — LIDOCAINE HYDROCHLORIDE 50 MG: 20 INJECTION, SOLUTION INTRAVENOUS at 15:13

## 2019-12-05 RX ADMIN — FENTANYL CITRATE 50 MCG: 50 INJECTION, SOLUTION INTRAMUSCULAR; INTRAVENOUS at 15:18

## 2019-12-05 RX ADMIN — HYDRALAZINE HYDROCHLORIDE 5 MG: 20 INJECTION INTRAMUSCULAR; INTRAVENOUS at 17:25

## 2019-12-05 RX ADMIN — MITOMYCIN 40 MG: 40 INJECTION, POWDER, LYOPHILIZED, FOR SOLUTION INTRAVENOUS at 17:00

## 2019-12-05 RX ADMIN — METOPROLOL TARTRATE 2.5 MG: 5 INJECTION, SOLUTION INTRAVENOUS at 13:50

## 2019-12-05 NOTE — TELEPHONE ENCOUNTER
Patient had TURBT on December 5th  Please call to arrange for a voiding trial on December 6th  If the patient does not wish to comment on Friday he can certainly come in for a voiding trial on the ninth Thanks

## 2019-12-05 NOTE — DISCHARGE INSTRUCTIONS
Transurethral Resection of Bladder Tumors   WHAT YOU NEED TO KNOW:   Transurethral resection of bladder tumors (TURBT) is surgery to remove one or more tumors from your bladder  DISCHARGE INSTRUCTIONS:   Medicines:   · Medicines  help decrease pain or prevent vomiting  · Take your medicine as directed  Contact your healthcare provider if you think your medicine is not helping or if you have side effects  Tell him or her if you are allergic to any medicine  Keep a list of the medicines, vitamins, and herbs you take  Include the amounts, and when and why you take them  Bring the list or the pill bottles to follow-up visits  Carry your medicine list with you in case of an emergency  Follow up with your healthcare provider as directed:  Write down your questions so you remember to ask them during your visits  Care for your Goins catheter:  Keep the bag below your waist  This will prevent urine from flowing back into your bladder and causing an infection or other problems  Also, keep the tube free of kinks so the urine will drain properly  Do not pull on the catheter  This can cause pain and bleeding and may cause the catheter to come out  Empty your urine drainage bag when it is ½ to ? full, or every 8 hours  If you have a smaller leg bag, empty it every 3 to 4 hours  Do the following when you empty your urine drainage bag:  · Hold the urine bag over the toilet or a large container  · Remove the drain spout from its sleeve at the bottom of the urine bag  Do not touch the tip of the drain spout  Open the slide valve on the spout  · Let the urine flow out of the urine bag into the toilet or container  Do not let the drainage tube touch anything  · Clean the end of the drain spout with alcohol when the bag is empty  Ask which cleaning solution is best to use  · Close the slide valve and put the drain spout into its sleeve at the bottom of the urine bag   Write down how much urine was in your bag if you were asked to keep a record  Contact your healthcare provider if:   · You have a fever or chills  · You have new or more blood in your urine  · You have nausea or are vomiting  · You have new or more pain when you urinate  · You are unable to control when you urinate  · You have questions or concerns about your condition or care  Seek care immediately or call 911 if:   · You have heavy bleeding from your urethra  · You start to urinate less often, very little, or not at all  · You have severe pain in your abdomen or pelvis  © 2017 [facility] Information is for End User's use only and may not be sold, redistributed or otherwise used for commercial purposes  All illustrations and images included in CareNotes® are the copyrighted property of A D A M [employer] Inc  or [employer]  The above information is an  only  It is not intended as medical advice for individual conditions or treatments  Talk to your doctor, nurse or pharmacist before following any medical regimen to see if it is safe and effective for you

## 2019-12-05 NOTE — INTERVAL H&P NOTE
H&P reviewed  After examining the patient I find no changes in the patients condition since the H&P had been written  Vitals:    12/05/19 1407   BP: (!) 195/85   Pulse: 61   Resp:    Temp:    SpO2:    Procedure reviewed with patient in the ambulatory unit  Risks discussed  Consent signed

## 2019-12-05 NOTE — DISCHARGE SUMMARY
DISCHARGE SUMMARY     Patient Name: Rasta Lacy    Patient MRN: [id number]    Admitting Provider: Charles Perry MD    Discharging Provider: Charles Perry MD    Primary Care Physician at Discharge: Mara Valencia [place] [phone]     Admission Date: 12/5/2019     Discharge Date: 12/5/2019    Admission Diagnosis   Positive urinary cytology [R82 89]    Discharge Diagnoses  Principal Problem:    Positive urinary cytology      Medications  Current Discharge Medication List         Current Discharge Medication List      CONTINUE these medications which have NOT CHANGED    Details   aspirin 81 MG tablet Take 81 mg by mouth daily  irbesartan (AVAPRO) 300 mg tablet Take 1 tablet (300 mg total) by mouth every morning  Qty: 90 tablet, Refills: 3    Associated Diagnoses: Essential hypertension      metFORMIN (GLUCOPHAGE-XR) 500 mg 24 hr tablet Take 1 tablet (500 mg total) by mouth 2 (two) times a day with meals  Qty: 180 tablet, Refills: 3    Associated Diagnoses: Type 2 diabetes mellitus without complication, without long-term current use of insulin ([facility])      metoprolol tartrate (LOPRESSOR) 50 mg tablet Take 1 tablet (50 mg total) by mouth 2 (two) times a day  Qty: 180 tablet, Refills: 3    Associated Diagnoses: Essential hypertension, benign      !! Multiple Vitamins-Minerals (CENTRUM SILVER 50+MEN PO) Take 1 tablet by mouth daily        !! Multiple Vitamins-Minerals (OCUVITE-LUTEIN PO) Take 1 tablet by mouth 2 (two) times a day        Omega-3 Fatty Acids (FISH OIL) 1200 MG CAPS Take 2 capsules by mouth daily       omeprazole (PriLOSEC) 40 MG capsule Take 40 mg by mouth daily at bedtime        simvastatin (ZOCOR) 20 mg tablet Take 20 mg by mouth daily at bedtime      VESICARE 10 MG tablet TAKE ONE TABLET BY MOUTH EVERY DAY  Qty: 90 tablet, Refills: 2    Associated Diagnoses: Overactive bladder       !! - Potential duplicate medications found  Please discuss with provider            Allergies  Allergies Allergen Reactions    Morphine And Related Other (See Comments)     While having an MI patient received morphine and had adverse reaction but doesn't know what happened  Outpatient Follow-Up  Vicki Kern MD  [facility] [address]  [phone]    Follow up  The office will call to arrange a follow-up appointment if none has already scheduled  ? Discharge Disposition  Home    Operative Procedures Performed  Procedure(s):  CYSTO W/TURBT AND TRANSURETHRAL PROSTATE BIOPSY AND OPENING OF BLADDER NECK CONTRACTURE, B/L Retrograde pyelogram  ?  Physical Exam at Discharge  Condition of Patient on Discharge: stable  ?   Vicki Kern MD

## 2019-12-05 NOTE — OP NOTE
OPERATIVE REPORT  PATIENT NAME: Harry Kaiser    :  1940  MRN: [id number]  Pt Location: [facility] OR ROOM 07    SURGERY DATE: 2019    Surgeon(s) and Role:     * Nu Lemos MD - Primary    Preop Diagnosis:  Positive urinary cytology [R82 89]    Post-Op Diagnosis Codes:     * Positive urinary cytology [R82 89]     * Bladder tumor [D49 4]    Procedure(s) (LRB):  CYSTO W/TURBT AND TRANSURETHRAL PROSTATE BIOPSY AND OPENING OF BLADDER NECK CONTRACTURE, B/L Retrograde pyelogram (N/A)    Specimen(s):  ID Type Source Tests Collected by Time Destination   1 : Bladder tumor Tissue Urinary Bladder TISSUE EXAM Nu Lemos MD 2019 1603    2 : transurethral prostate biopsy Tissue Prostate TISSUE EXAM Nu Lemos MD 2019 1606        Estimated Blood Loss:   Minimal    Drains:  Urethral Catheter Latex; Double-lumen 24 Fr  (Active)   Number of days: 350       Urethral Catheter Latex 22 Fr  (Active)   Reasons to continue Urinary Catheter  Post-operative urological requirements 2019  4:26 PM   Collection Container Standard drainage bag 2019  4:26 PM   Number of days: 0       Anesthesia Type:   General    Operative Indications:  Positive urinary cytology [R82 89]      Operative Findings:  Normal urethra there is residual apical prostatic obstruction and a mild bladder neck contracture  Ureteral orifices are normal   Bilateral retrograde pyelography did not disclose any filling defects or lesions  A 3 cm bladder tumor was noted on the right anterior bladder neck  This was resected fully  A 22 Mexican Goins was left in place  Surrounding mucosa was cauterized  Mitomycin-C was ordered in the operating room and administered in the PACU  The procedure was well tolerated  Complications:   None    Procedure and Technique:  Patient was brought to the operative room properly identified  General anesthesia administered was administered    Patient was placed in lithotomy position and prepped draped in the usual sterile fashion  Compression employed  Intravenous antibiotic was administered  An appropriate time-out was performed  Cystourethroscopy was performed with a 25 Cameroonian cystoscope with findings as above  With gentle pressure I was able to get through bladder neck  Bilateral retrograde pyelography was then performed with a tiger tail catheter and dilute Omnipaque  Findings are as above  With cystoscopy a bladder tumor was noted at the level of the bladder neck anteriorly on the right  The bladder was left full  And the cystoscope was removed  The 25 Cameroonian resectoscope was placed into the bladder  This was tight to the bladder neck and the bladder neck contracture was opened by taking tissue at the 5 and 7 o'clock positions  This was also sent for pathology  This opened the bladder neck nicely  The bladder tumor was then resected down to muscularis  The base and surrounding mucosa were cauterized  Hemostasis appeared adequate  When hemostasis was confirmed and all chips were irrigated from the bladder the resectoscope was removed  A 22 Cameroonian Goins catheter was easily placed in the balloon filled to 10 cc  The catheter irrigated well and irrigant returned clear  Blood loss was minimal   The patient tolerated procedure well  He was taken to the recovery room in satisfactory condition  Mitomycin C will be administered in the recovery room    I was present for the entire procedure    Patient Disposition:  PACU     SIGNATURE: Charles Perry MD  DATE: December 5, 2019  TIME: 5:43 PM

## 2019-12-05 NOTE — ANESTHESIA PREPROCEDURE EVALUATION
Review of Systems/Medical History  Patient summary reviewed  Chart reviewed      Cardiovascular  Exercise tolerance (METS): >4,  Hyperlipidemia, Hypertension poorly controlled, Past MI (Remote) , CAD , History of CABG (5 yrs ago ), No angina ,    Pulmonary  Negative pulmonary ROS        GI/Hepatic    GERD well controlled,        Negative  ROS        Endo/Other  Diabetes type 2 Oral agent,      GYN  Negative gynecology ROS          Hematology  Negative hematology ROS      Musculoskeletal    Arthritis     Neurology  Negative neurology ROS      Psychology   Negative psychology ROS              Physical Exam    Airway    Mallampati score: II  TM Distance: >3 FB  Neck ROM: full     Dental       Cardiovascular  Rhythm: regular, Rate: normal,     Pulmonary  Breath sounds clear to auscultation,     Other Findings  caps      Anesthesia Plan  ASA Score- 3     Anesthesia Type- general with ASA Monitors  Additional Monitors:   Airway Plan:     Comment: Did not take beta blocker today  HTN preop  Metoprolol 2 5 mg IV in SDS ordered        Plan Factors- Patient instructed to abstain from smoking on day of procedure  Patient did not smoke on day of surgery  Induction- intravenous  Postoperative Plan-     Informed Consent- Anesthetic plan and risks discussed with patient and spouse

## 2019-12-06 ENCOUNTER — PROCEDURE VISIT (OUTPATIENT)
Dept: UROLOGY | Facility: MEDICAL CENTER | Age: 79
End: 2019-12-06
Payer: MEDICARE

## 2019-12-06 VITALS
HEART RATE: 68 BPM | DIASTOLIC BLOOD PRESSURE: 82 MMHG | BODY MASS INDEX: 30.62 KG/M2 | HEIGHT: 68 IN | WEIGHT: 202 LBS | SYSTOLIC BLOOD PRESSURE: 150 MMHG

## 2019-12-06 DIAGNOSIS — C67.2 MALIGNANT NEOPLASM OF LATERAL WALL OF URINARY BLADDER (HCC): Primary | ICD-10-CM

## 2019-12-06 DIAGNOSIS — I10 ESSENTIAL HYPERTENSION: ICD-10-CM

## 2019-12-06 PROCEDURE — 99211 OFF/OP EST MAY X REQ PHY/QHP: CPT | Performed by: UROLOGY

## 2019-12-06 RX ORDER — IRBESARTAN 300 MG/1
TABLET ORAL
Qty: 90 TABLET | Refills: 3 | Status: SHIPPED | OUTPATIENT
Start: 2019-12-06 | End: 2021-02-26 | Stop reason: HOSPADM

## 2019-12-06 NOTE — TELEPHONE ENCOUNTER
Spoke with the patient; I called to clarify that normally, we take out catheters in the morning - that way, if in six or so hours, if someone has trouble voiding, they can return to the office to have their Goins reinserted  However, because he has an afternoon appointment, if he's unable to urinate later on, he would not be able to return because we'll be closed for the weekend  If he needs medical attention following his Goins removal, he'll need to go to the ER  If he'd like, I can move his appointment to 12/09/2019 and give him an early appointment time  Patient comprehends this, but would prefer to keep today's appointment  He understands the risks and is willing to take them  He has no questions or concerns to be addressed at this time

## 2019-12-06 NOTE — PROGRESS NOTES
12/6/2019    [address] [place] [address]  1940  [id number]    Diagnosis      Patient presents for sweet catheter removal managed by Dr Farzaneh Woods  Return for previously scheduled office visit  Pt was advised about not removing the catheter given gross hematuria and given the time of day, risk for urinary retention discussed with patient  Pt instructed if he is unable to void he will need to go to the ER  Procedure Sweet removal  Pt presents with fruit punch colored urine in drainage bag  No clots noted  Sweet catheter removed after deflation of an intact balloon  Patient tolerated well   Encouraged patient to hydrate well           Vitals:    12/06/19 1256   BP: 150/82   BP Location: Left arm   Patient Position: Sitting   Cuff Size: Standard   Pulse: 68   Weight: 91 6 kg (202 lb)   Height: 5' 8" (1 727 m)           Fredy St RN

## 2019-12-06 NOTE — TELEPHONE ENCOUNTER
Patient called in regard to having catheter removed today  He can be reached at [phone]  Thank you

## 2019-12-09 ENCOUNTER — TELEPHONE (OUTPATIENT)
Dept: UROLOGY | Facility: AMBULATORY SURGERY CENTER | Age: 79
End: 2019-12-09

## 2019-12-09 NOTE — TELEPHONE ENCOUNTER
Patient managed by Robert Dominguez had surgery with Dr Kristel Tran on 12/05/19 is having problems and questions

## 2019-12-09 NOTE — TELEPHONE ENCOUNTER
Called patient - He states that he is having accidents all the time since his surgery  He is requesting medication stronger then the Vesicare  Spoke with Dr Memo Cortés - patient scheduled to come in tomorrow for PVR and possible culture

## 2019-12-10 ENCOUNTER — PROCEDURE VISIT (OUTPATIENT)
Dept: UROLOGY | Facility: MEDICAL CENTER | Age: 79
End: 2019-12-10
Payer: MEDICARE

## 2019-12-10 VITALS — HEIGHT: 68 IN | BODY MASS INDEX: 30.62 KG/M2 | TEMPERATURE: 98.2 F | HEART RATE: 70 BPM | WEIGHT: 202 LBS

## 2019-12-10 DIAGNOSIS — R39.15 URGENCY OF URINATION: Primary | ICD-10-CM

## 2019-12-10 LAB
POST-VOID RESIDUAL VOLUME, ML POC: 18 ML
SL AMB  POCT GLUCOSE, UA: NEGATIVE
SL AMB LEUKOCYTE ESTERASE,UA: ABNORMAL
SL AMB POCT BILIRUBIN,UA: NEGATIVE
SL AMB POCT BLOOD,UA: ABNORMAL
SL AMB POCT CLARITY,UA: CLEAR
SL AMB POCT COLOR,UA: ABNORMAL
SL AMB POCT KETONES,UA: NEGATIVE
SL AMB POCT NITRITE,UA: NEGATIVE
SL AMB POCT PH,UA: 6.5
SL AMB POCT SPECIFIC GRAVITY,UA: 1.01
SL AMB POCT URINE PROTEIN: ABNORMAL
SL AMB POCT UROBILINOGEN: ABNORMAL

## 2019-12-10 PROCEDURE — 81003 URINALYSIS AUTO W/O SCOPE: CPT

## 2019-12-10 PROCEDURE — 87086 URINE CULTURE/COLONY COUNT: CPT | Performed by: UROLOGY

## 2019-12-10 PROCEDURE — 51798 US URINE CAPACITY MEASURE: CPT

## 2019-12-10 PROCEDURE — 99211 OFF/OP EST MAY X REQ PHY/QHP: CPT

## 2019-12-10 NOTE — PROGRESS NOTES
Pt in office to assess UTI and urinary retention  Pt c/o frequency and urgency of urination  U/U reveals large RBCs and small WBCs  PVR = 18cc  VSWNL  Urine specimen sent to lab for u/c  Pt told to hydrate with water and avoid bladder stimulants since he is drinking a lot of cristiano  He understands all instructions

## 2019-12-11 LAB — BACTERIA UR CULT: NORMAL

## 2019-12-12 ENCOUNTER — TELEPHONE (OUTPATIENT)
Dept: UROLOGY | Facility: MEDICAL CENTER | Age: 79
End: 2019-12-12

## 2019-12-12 NOTE — TELEPHONE ENCOUNTER
----- Message from Mami Okeefe MD sent at 12/12/2019 12:43 PM EST -----  Urine culture nl  Inform pt    Thank you

## 2019-12-12 NOTE — TELEPHONE ENCOUNTER
Notes recorded by Jackie Espino MA on 12/12/2019 at 12:59 PM EST  Gave normal UC results to patients spouse

## 2019-12-17 ENCOUNTER — TELEPHONE (OUTPATIENT)
Dept: UROLOGY | Facility: CLINIC | Age: 79
End: 2019-12-17

## 2019-12-17 ENCOUNTER — OFFICE VISIT (OUTPATIENT)
Dept: UROLOGY | Facility: MEDICAL CENTER | Age: 79
End: 2019-12-17
Payer: MEDICARE

## 2019-12-17 VITALS
HEART RATE: 73 BPM | SYSTOLIC BLOOD PRESSURE: 140 MMHG | BODY MASS INDEX: 30.46 KG/M2 | WEIGHT: 201 LBS | HEIGHT: 68 IN | DIASTOLIC BLOOD PRESSURE: 82 MMHG

## 2019-12-17 DIAGNOSIS — C67.2 MALIGNANT NEOPLASM OF LATERAL WALL OF URINARY BLADDER (HCC): Primary | ICD-10-CM

## 2019-12-17 PROCEDURE — 81003 URINALYSIS AUTO W/O SCOPE: CPT | Performed by: UROLOGY

## 2019-12-17 PROCEDURE — 99214 OFFICE O/P EST MOD 30 MIN: CPT | Performed by: UROLOGY

## 2019-12-17 NOTE — TELEPHONE ENCOUNTER
----- Message from Britta Hood sent at 12/17/2019  3:05 PM EST -----  DR JARVIS WOULD LIKE THIS PATIENT STARTED ON BCG

## 2019-12-17 NOTE — PROGRESS NOTES
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  Muscularis was negative  He had a similar tumor in December 2018  The case was reviewed with Dr Debra Steve who did the patient's surgery  At this point, we will recommend a course of BCG to begin approximately a month after the patient's transurethral resection  The patient agrees to this course of treatment  The following portions of the patient's history were reviewed and updated as appropriate: allergies, current medications, past family history, past medical history, past social history, past surgical history and problem list     Review of Systems    Constitutional: Negative for activity change and fatigue  Respiratory: Negative for shortness of breath and wheezing     Cardiovascular: Negative for chest pain         Hypertension    Gastrointestinal: Negative for abdominal pain  Endocrine:        Non-insulin dependent diabetes    Genitourinary: Negative for difficulty urinating, dysuria, frequency, hematuria and urgency  Musculoskeletal: Negative for back pain and gait problem  Skin: Negative     Allergic/Immunologic: Negative     Neurological: Negative     Psychiatric/Behavioral: Negative  Objective:      /82 (BP Location: Left arm, Patient Position: Sitting, Cuff Size: Adult)   Pulse 73   Ht 5' 8" (1 727 m)   Wt 91 2 kg (201 lb)   BMI 30 56 kg/m²          Physical Exam   Constitutional: He is oriented to person, place, and time  He appears well-developed and well-nourished  HENT:   Head: Normocephalic and atraumatic  Eyes: EOM are normal    Neck: Normal range of motion  Pulmonary/Chest: Effort normal    Musculoskeletal: Normal range of motion  Neurological: He is alert and oriented to person, place, and time  Skin: Skin is warm and dry  Psychiatric: He has a normal mood and affect   His behavior is normal  Judgment and thought content normal

## 2019-12-17 NOTE — TELEPHONE ENCOUNTER
Patient of Dr Sharath Green managed at the [facility]  Patient diagnosed with high grade non invasive bladder cancer 12/2018  He had a repeat resection 12/2019 which also revealed high grade non invasive pathology  It has been recommended that he proceed with BCG induction x6 doses  Will risk stratify based on AUA guideline for current worldwide shortage of BCG  Will notify patient once determined that stock is available

## 2019-12-19 NOTE — TELEPHONE ENCOUNTER
Called pt and appts made for BCG X 6 starting 1/7/20  Appt for H&P 3/23/20  Per Dr Murray Levels bladder bx's will be done in the OR

## 2019-12-19 NOTE — TELEPHONE ENCOUNTER
Patient is now technically high risk according to risk stratification for high grade Ta disease with recurrence  BCG stock is available  Ok to offer BCG induction x6 and schedule

## 2020-01-03 ENCOUNTER — TELEPHONE (OUTPATIENT)
Dept: UROLOGY | Facility: MEDICAL CENTER | Age: 80
End: 2020-01-03

## 2020-01-03 DIAGNOSIS — R39.15 URGENCY OF URINATION: Primary | ICD-10-CM

## 2020-01-03 DIAGNOSIS — R35.0 URINARY FREQUENCY: ICD-10-CM

## 2020-01-03 NOTE — TELEPHONE ENCOUNTER
This is a patient of Dr Prema Guy in [facility]  Patient has an upcoming BGG starting 1/7/20  Patient is having frequent involuntary urination and will not be able to hold for 2 hours  Please call patient back at  [phone]

## 2020-01-03 NOTE — TELEPHONE ENCOUNTER
LMOM to go for a urine culture today or tomorrow and call on Monday for the results, and to discuss symptoms further

## 2020-01-04 ENCOUNTER — APPOINTMENT (OUTPATIENT)
Dept: LAB | Facility: CLINIC | Age: 80
End: 2020-01-04
Payer: MEDICARE

## 2020-01-04 DIAGNOSIS — R39.15 URGENCY OF URINATION: ICD-10-CM

## 2020-01-04 DIAGNOSIS — R35.0 URINARY FREQUENCY: ICD-10-CM

## 2020-01-04 LAB
BACTERIA UR QL AUTO: ABNORMAL /HPF
BILIRUB UR QL STRIP: NEGATIVE
CLARITY UR: ABNORMAL
COLOR UR: YELLOW
GLUCOSE UR STRIP-MCNC: NEGATIVE MG/DL
HGB UR QL STRIP.AUTO: ABNORMAL
HYALINE CASTS #/AREA URNS LPF: ABNORMAL /LPF
KETONES UR STRIP-MCNC: NEGATIVE MG/DL
LEUKOCYTE ESTERASE UR QL STRIP: ABNORMAL
NITRITE UR QL STRIP: NEGATIVE
NON-SQ EPI CELLS URNS QL MICRO: ABNORMAL /HPF
PH UR STRIP.AUTO: 5.5 [PH]
PROT UR STRIP-MCNC: ABNORMAL MG/DL
RBC #/AREA URNS AUTO: ABNORMAL /HPF
SP GR UR STRIP.AUTO: 1.02 (ref 1–1.03)
UROBILINOGEN UR QL STRIP.AUTO: 1 E.U./DL
WBC #/AREA URNS AUTO: ABNORMAL /HPF

## 2020-01-04 PROCEDURE — 87086 URINE CULTURE/COLONY COUNT: CPT

## 2020-01-04 PROCEDURE — 81001 URINALYSIS AUTO W/SCOPE: CPT

## 2020-01-05 LAB — BACTERIA UR CULT: NORMAL

## 2020-01-06 ENCOUNTER — TELEPHONE (OUTPATIENT)
Dept: UROLOGY | Facility: MEDICAL CENTER | Age: 80
End: 2020-01-06

## 2020-01-06 ENCOUNTER — TELEPHONE (OUTPATIENT)
Dept: UROLOGY | Facility: CLINIC | Age: 80
End: 2020-01-06

## 2020-01-06 DIAGNOSIS — C67.2 MALIGNANT NEOPLASM OF LATERAL WALL OF URINARY BLADDER (HCC): Primary | ICD-10-CM

## 2020-01-06 NOTE — TELEPHONE ENCOUNTER
----- Message from Mey Curran MD sent at 1/6/2020  7:55 AM EST -----  Urine culture sterile  Please inform the patient  Thank you  Please inform the patient

## 2020-01-06 NOTE — TELEPHONE ENCOUNTER
As per Dr Cherie Anderson, ok to have BCG instillation tomorrow  Pt aware and instructions given per protocol pre and post instillaiton

## 2020-01-06 NOTE — TELEPHONE ENCOUNTER
Confirmed with patient that he can still have his BCG tomorrow per Dr Bri Villarreal  I reiterated with the patient and his wife over the phone some of the possible side effects and what to expect with BCG installation, encouraged him to try and hold his urine for 2 hours with rotations every 15 minutes on each side to get the medication coated throughout the bladder as best as possible  Advised him and his wife how to clean with bleach after he voids  I explained if he would develop any fevers or chills after treatment to contact our office for further recommendations  Patient and his wife both gave verbal understanding over the phone

## 2020-01-06 NOTE — TELEPHONE ENCOUNTER
----- Message from Fredy Eaton MD sent at 1/6/2020  7:55 AM EST -----  Urine culture sterile  Please inform the patient  Thank you  Please inform the patient

## 2020-01-06 NOTE — TELEPHONE ENCOUNTER
Patient stated his urinary frequency has subsided and he is scheduled for BCG tomorrow  Please see urine culture in chart  Please advise after reviewing urine culture  Should he still attend for tomorrow for BCG? Any other instructions?

## 2020-01-07 ENCOUNTER — PROCEDURE VISIT (OUTPATIENT)
Dept: UROLOGY | Facility: MEDICAL CENTER | Age: 80
End: 2020-01-07
Payer: MEDICARE

## 2020-01-07 VITALS — HEIGHT: 68 IN | HEART RATE: 68 BPM | TEMPERATURE: 97.9 F | WEIGHT: 201 LBS | BODY MASS INDEX: 30.46 KG/M2

## 2020-01-07 DIAGNOSIS — C67.9 MALIGNANT NEOPLASM OF URINARY BLADDER, UNSPECIFIED SITE (HCC): Primary | ICD-10-CM

## 2020-01-07 LAB
SL AMB  POCT GLUCOSE, UA: NEGATIVE
SL AMB LEUKOCYTE ESTERASE,UA: ABNORMAL
SL AMB POCT BILIRUBIN,UA: NEGATIVE
SL AMB POCT BLOOD,UA: ABNORMAL
SL AMB POCT CLARITY,UA: CLEAR
SL AMB POCT COLOR,UA: ABNORMAL
SL AMB POCT KETONES,UA: NEGATIVE
SL AMB POCT NITRITE,UA: NEGATIVE
SL AMB POCT PH,UA: 6
SL AMB POCT SPECIFIC GRAVITY,UA: 1.02
SL AMB POCT URINE PROTEIN: ABNORMAL
SL AMB POCT UROBILINOGEN: ABNORMAL

## 2020-01-07 PROCEDURE — 51720 TREATMENT OF BLADDER LESION: CPT

## 2020-01-07 PROCEDURE — 81003 URINALYSIS AUTO W/O SCOPE: CPT

## 2020-01-07 NOTE — TELEPHONE ENCOUNTER
Call placed to patient and informed him that he can eat breakfast  Patient stated "I wanted to eat an apple, is that ok?"  Instructed patient that was acceptable  Patient is aware and understands

## 2020-01-07 NOTE — TELEPHONE ENCOUNTER
This is a patient of Dr Macario Chavez in [facility]  Patient has a procedure today and wants to know if he can eat before his procedure  Please call him asap at [phone]  Patient states notes say do not drink 4 hours before procedure but does not say anything about eating

## 2020-01-07 NOTE — PROGRESS NOTES
1/7/2020    [address] [facility]  1940  [id number]    Diagnosis  Chief Complaint     Bladder Cancer          Patient presents for BCG 1 of 6 managed by Dr Mariah Cueto This Encounter   Procedures    POCT urine dip auto non-scope       Patient instructed to call with persistent hematuria, fever, or other concerns  Procedure BCG treatment 1 of 6  Vitals:    01/07/20 1222   Pulse: 68   Temp: 97 9 °F (36 6 °C)   TempSrc: Tympanic   Weight: 91 2 kg (201 lb)   Height: 5' 8" (1 727 m)       Recent Results (from the past 4 hour(s))   POCT urine dip auto non-scope    Collection Time: 01/07/20 12:24 PM   Result Value Ref Range     COLOR,UA SHAYNE     CLARITY,UA CLEAR     SPECIFIC GRAVITY,UA 1 020      PH,UA 6 0     LEUKOCYTE ESTERASE,UA SMALL     NITRITE,UA NEGATIVE     GLUCOSE, UA NEGATIVE     KETONES,UA NEGATIVE     BILIRUBIN,UA NEGATIVE     BLOOD,UA TRACE     POCT URINE PROTEIN 100mg/dL     SL AMB POCT UROBILINOGEN 0 2 E U /dL            Procedures    Sterile technique employed  Patient prepped and identified  14F red rubber coude'-tip straight catheter placed  Bladder drained, residual approximately 20mL  The following solution was instilled directly into the bladder via catheter: 1 Vial BCG  Catheter removed  Patient discharged  Patient tolerated procedure       Administrations This Visit     BCG (ROXANA BCG) 50 mg in sodium chloride 0 9 % 50 mL bladder instillation     Admin Date  01/07/2020 Action  Given Dose  50 mg Route  Bladder Instillation Administered By  Tato Tate

## 2020-01-14 ENCOUNTER — PROCEDURE VISIT (OUTPATIENT)
Dept: UROLOGY | Facility: MEDICAL CENTER | Age: 80
End: 2020-01-14
Payer: MEDICARE

## 2020-01-14 VITALS — HEIGHT: 68 IN | BODY MASS INDEX: 30.46 KG/M2 | TEMPERATURE: 98.5 F | HEART RATE: 63 BPM | WEIGHT: 201 LBS

## 2020-01-14 DIAGNOSIS — C67.9 MALIGNANT NEOPLASM OF URINARY BLADDER, UNSPECIFIED SITE (HCC): Primary | ICD-10-CM

## 2020-01-14 PROCEDURE — 81003 URINALYSIS AUTO W/O SCOPE: CPT

## 2020-01-14 PROCEDURE — 99024 POSTOP FOLLOW-UP VISIT: CPT

## 2020-01-14 PROCEDURE — 51720 TREATMENT OF BLADDER LESION: CPT

## 2020-01-14 NOTE — PROGRESS NOTES
1/14/2020    [address] [place] [address]  1940  [id number]    Diagnosis  Chief Complaint     Bladder Cancer          Patient presents for BCG 2 of 6 managed by Dr Nia Cleveland This Encounter   Procedures    POCT urine dip auto non-scope       Patient instructed to call with persistent hematuria, fever, or other concerns  Procedure BCG treatment 2 of 6  Vitals:    01/14/20 1057   Pulse: 63   Temp: 98 5 °F (36 9 °C)   TempSrc: Tympanic   Weight: 91 2 kg (201 lb)   Height: 5' 8" (1 727 m)       Recent Results (from the past 4 hour(s))   POCT urine dip auto non-scope    Collection Time: 01/14/20 11:41 AM   Result Value Ref Range     COLOR,UA SHAYNE     CLARITY,UA CLEAR     SPECIFIC GRAVITY,UA 1 025      PH,UA 6 0     LEUKOCYTE ESTERASE,UA SMALL     NITRITE,UA NEGATIVE     GLUCOSE, UA NEGATIVE     KETONES,UA NEGATIVE     BILIRUBIN,UA NEGATIVE     BLOOD,UA TRACE     POCT URINE PROTEIN 100 mg/dL     SL AMB POCT UROBILINOGEN 1 0 E U /dL            Procedures    Sterile technique employed  Patient prepped and identified  # 14F red rubber coude'-tip straight catheter placed  Bladder drained, residual approximately 20mL  The following solution was instilled directly into the bladder via catheter: 1 Vial BCG  Catheter removed  Patient discharged  Patient tolerated procedure well             [employer]

## 2020-01-21 ENCOUNTER — PROCEDURE VISIT (OUTPATIENT)
Dept: UROLOGY | Facility: MEDICAL CENTER | Age: 80
End: 2020-01-21
Payer: MEDICARE

## 2020-01-21 VITALS — HEART RATE: 61 BPM | BODY MASS INDEX: 30.46 KG/M2 | WEIGHT: 201 LBS | HEIGHT: 68 IN | TEMPERATURE: 97.9 F

## 2020-01-21 DIAGNOSIS — C67.9 MALIGNANT NEOPLASM OF URINARY BLADDER, UNSPECIFIED SITE (HCC): Primary | ICD-10-CM

## 2020-01-21 LAB
SL AMB  POCT GLUCOSE, UA: NEGATIVE
SL AMB LEUKOCYTE ESTERASE,UA: ABNORMAL
SL AMB POCT BILIRUBIN,UA: NEGATIVE
SL AMB POCT BLOOD,UA: ABNORMAL
SL AMB POCT CLARITY,UA: CLEAR
SL AMB POCT COLOR,UA: ABNORMAL
SL AMB POCT KETONES,UA: NEGATIVE
SL AMB POCT NITRITE,UA: NEGATIVE
SL AMB POCT PH,UA: 5.5
SL AMB POCT SPECIFIC GRAVITY,UA: 1.01
SL AMB POCT URINE PROTEIN: ABNORMAL
SL AMB POCT UROBILINOGEN: ABNORMAL

## 2020-01-21 PROCEDURE — 81003 URINALYSIS AUTO W/O SCOPE: CPT

## 2020-01-21 PROCEDURE — 51720 TREATMENT OF BLADDER LESION: CPT

## 2020-01-28 ENCOUNTER — PROCEDURE VISIT (OUTPATIENT)
Dept: UROLOGY | Facility: MEDICAL CENTER | Age: 80
End: 2020-01-28
Payer: MEDICARE

## 2020-01-28 VITALS
WEIGHT: 200.2 LBS | DIASTOLIC BLOOD PRESSURE: 98 MMHG | BODY MASS INDEX: 30.34 KG/M2 | HEIGHT: 68 IN | SYSTOLIC BLOOD PRESSURE: 144 MMHG

## 2020-01-28 DIAGNOSIS — C67.9 MALIGNANT NEOPLASM OF URINARY BLADDER, UNSPECIFIED SITE (HCC): Primary | ICD-10-CM

## 2020-01-28 LAB
SL AMB  POCT GLUCOSE, UA: NEGATIVE
SL AMB LEUKOCYTE ESTERASE,UA: NORMAL
SL AMB POCT BILIRUBIN,UA: NEGATIVE
SL AMB POCT BLOOD,UA: NORMAL
SL AMB POCT CLARITY,UA: CLEAR
SL AMB POCT COLOR,UA: YELLOW
SL AMB POCT KETONES,UA: NEGATIVE
SL AMB POCT NITRITE,UA: NEGATIVE
SL AMB POCT PH,UA: 6
SL AMB POCT SPECIFIC GRAVITY,UA: 1.02
SL AMB POCT URINE PROTEIN: 100
SL AMB POCT UROBILINOGEN: 0.2

## 2020-01-28 PROCEDURE — 51720 TREATMENT OF BLADDER LESION: CPT | Performed by: UROLOGY

## 2020-01-28 PROCEDURE — 81002 URINALYSIS NONAUTO W/O SCOPE: CPT

## 2020-01-28 PROCEDURE — 99024 POSTOP FOLLOW-UP VISIT: CPT

## 2020-01-28 NOTE — PROGRESS NOTES
approximately 25mL  The following solution was instilled directly into the bladder via catheter: 1 Vial BCG  Catheter removed  Patient discharged  Patient tolerated procedure             Aziza Ordoñez RN

## 2020-01-30 DIAGNOSIS — E11.9 TYPE 2 DIABETES MELLITUS WITHOUT COMPLICATION, WITHOUT LONG-TERM CURRENT USE OF INSULIN (HCC): ICD-10-CM

## 2020-01-30 RX ORDER — METFORMIN HYDROCHLORIDE 500 MG/1
TABLET, EXTENDED RELEASE ORAL
Qty: 360 TABLET | Refills: 0 | Status: SHIPPED | OUTPATIENT
Start: 2020-01-30 | End: 2020-09-03 | Stop reason: ALTCHOICE

## 2020-02-04 ENCOUNTER — APPOINTMENT (OUTPATIENT)
Dept: LAB | Facility: CLINIC | Age: 80
End: 2020-02-04
Payer: MEDICARE

## 2020-02-04 ENCOUNTER — PROCEDURE VISIT (OUTPATIENT)
Dept: UROLOGY | Facility: MEDICAL CENTER | Age: 80
End: 2020-02-04
Payer: MEDICARE

## 2020-02-04 VITALS — HEART RATE: 80 BPM | HEIGHT: 68 IN | WEIGHT: 200.18 LBS | TEMPERATURE: 97.9 F | BODY MASS INDEX: 30.34 KG/M2

## 2020-02-04 DIAGNOSIS — E11.59 TYPE 2 DIABETES MELLITUS WITH OTHER CIRCULATORY COMPLICATION, WITHOUT LONG-TERM CURRENT USE OF INSULIN (HCC): ICD-10-CM

## 2020-02-04 DIAGNOSIS — C67.9 MALIGNANT NEOPLASM OF URINARY BLADDER, UNSPECIFIED SITE (HCC): Primary | ICD-10-CM

## 2020-02-04 LAB
ANION GAP SERPL CALCULATED.3IONS-SCNC: 3 MMOL/L (ref 4–13)
BUN SERPL-MCNC: 20 MG/DL (ref 5–25)
CALCIUM SERPL-MCNC: 8.6 MG/DL (ref 8.3–10.1)
CHLORIDE SERPL-SCNC: 107 MMOL/L (ref 100–108)
CO2 SERPL-SCNC: 29 MMOL/L (ref 21–32)
CREAT SERPL-MCNC: 1.27 MG/DL (ref 0.6–1.3)
EST. AVERAGE GLUCOSE BLD GHB EST-MCNC: 140 MG/DL
GFR SERPL CREATININE-BSD FRML MDRD: 53 ML/MIN/1.73SQ M
GLUCOSE P FAST SERPL-MCNC: 165 MG/DL (ref 65–99)
HBA1C MFR BLD: 6.5 % (ref 4.2–6.3)
POTASSIUM SERPL-SCNC: 3.6 MMOL/L (ref 3.5–5.3)
SL AMB  POCT GLUCOSE, UA: NEGATIVE
SL AMB LEUKOCYTE ESTERASE,UA: NEGATIVE
SL AMB POCT BILIRUBIN,UA: NEGATIVE
SL AMB POCT BLOOD,UA: NEGATIVE
SL AMB POCT CLARITY,UA: CLEAR
SL AMB POCT COLOR,UA: NORMAL
SL AMB POCT KETONES,UA: NEGATIVE
SL AMB POCT NITRITE,UA: NEGATIVE
SL AMB POCT PH,UA: 6.5
SL AMB POCT SPECIFIC GRAVITY,UA: 1.01
SL AMB POCT URINE PROTEIN: NORMAL
SL AMB POCT UROBILINOGEN: NORMAL
SODIUM SERPL-SCNC: 139 MMOL/L (ref 136–145)

## 2020-02-04 PROCEDURE — 36415 COLL VENOUS BLD VENIPUNCTURE: CPT

## 2020-02-04 PROCEDURE — 81003 URINALYSIS AUTO W/O SCOPE: CPT

## 2020-02-04 PROCEDURE — 83036 HEMOGLOBIN GLYCOSYLATED A1C: CPT

## 2020-02-04 PROCEDURE — 4040F PNEUMOC VAC/ADMIN/RCVD: CPT

## 2020-02-04 PROCEDURE — 80048 BASIC METABOLIC PNL TOTAL CA: CPT

## 2020-02-04 PROCEDURE — 3077F SYST BP >= 140 MM HG: CPT

## 2020-02-04 PROCEDURE — 3044F HG A1C LEVEL LT 7.0%: CPT

## 2020-02-04 PROCEDURE — 3066F NEPHROPATHY DOC TX: CPT

## 2020-02-04 PROCEDURE — 3008F BODY MASS INDEX DOCD: CPT

## 2020-02-04 PROCEDURE — 51720 TREATMENT OF BLADDER LESION: CPT

## 2020-02-04 PROCEDURE — 3080F DIAST BP >= 90 MM HG: CPT

## 2020-02-04 NOTE — PROGRESS NOTES
2/4/2020    [address] [facility]  1940  [id number]    Diagnosis  Chief Complaint     Bladder Cancer          Patient presents for BCG 5 of 6 managed by Dr Mitchel Olszewski This Encounter   Procedures    POCT urine dip auto non-scope       Patient instructed to call with persistent hematuria, fever, or other concerns  Procedure BCG treatment 5 of 6  Vitals:    02/04/20 1209   Pulse: 80   Temp: 97 9 °F (36 6 °C)   TempSrc: Tympanic   Weight: 90 8 kg (200 lb 2 8 oz)   Height: 5' 8" (1 727 m)       Recent Results (from the past 4 hour(s))   Basic metabolic panel    Collection Time: 02/04/20  9:20 AM   Result Value Ref Range    Sodium 139 136 - 145 mmol/L    Potassium 3 6 3 5 - 5 3 mmol/L    Chloride 107 100 - 108 mmol/L    CO2 29 21 - 32 mmol/L    ANION GAP 3 (L) 4 - 13 mmol/L    BUN 20 5 - 25 mg/dL    Creatinine 1 27 0 60 - 1 30 mg/dL    Glucose, Fasting 165 (H) 65 - 99 mg/dL    Calcium 8 6 8 3 - 10 1 mg/dL    eGFR 53 ml/min/1 73sq m   Hemoglobin A1C    Collection Time: 02/04/20  9:20 AM   Result Value Ref Range    Hemoglobin A1C 6 5 (H) 4 2 - 6 3 %     mg/dl   POCT urine dip auto non-scope    Collection Time: 02/04/20 12:10 PM   Result Value Ref Range     COLOR,UA SHAYNE     CLARITY,UA CLEAR     SPECIFIC GRAVITY,UA 1 015      PH,UA 6 5     LEUKOCYTE ESTERASE,UA NEGATIVE     NITRITE,UA NEGATIVE     GLUCOSE, UA NEGATIVE     KETONES,UA NEGATIVE     BILIRUBIN,UA NEGATIVE     BLOOD,UA NEGATIVE     POCT URINE PROTEIN 100mg/dL     SL AMB POCT UROBILINOGEN 0 2 E U /dL            Procedures    Sterile technique employed  Patient prepped and identified  #14F red rubber coude'-tip straight catheter placed  Bladder drained, Residual approximately 20mL  The following solution was instilled directly into the bladder via catheter: 1 Vial BCG  Catheter removed  Patient discharged  Patient tolerated procedure well             [employer]

## 2020-02-11 ENCOUNTER — PROCEDURE VISIT (OUTPATIENT)
Dept: UROLOGY | Facility: MEDICAL CENTER | Age: 80
End: 2020-02-11
Payer: MEDICARE

## 2020-02-11 VITALS — WEIGHT: 200.18 LBS | HEIGHT: 68 IN | TEMPERATURE: 98 F | BODY MASS INDEX: 30.34 KG/M2

## 2020-02-11 DIAGNOSIS — C67.9 MALIGNANT NEOPLASM OF URINARY BLADDER, UNSPECIFIED SITE (HCC): Primary | ICD-10-CM

## 2020-02-11 LAB
SL AMB  POCT GLUCOSE, UA: NEGATIVE
SL AMB LEUKOCYTE ESTERASE,UA: NEGATIVE
SL AMB POCT BILIRUBIN,UA: NEGATIVE
SL AMB POCT BLOOD,UA: NEGATIVE
SL AMB POCT CLARITY,UA: CLEAR
SL AMB POCT COLOR,UA: NORMAL
SL AMB POCT KETONES,UA: NEGATIVE
SL AMB POCT NITRITE,UA: NEGATIVE
SL AMB POCT PH,UA: 5.5
SL AMB POCT SPECIFIC GRAVITY,UA: 1.02
SL AMB POCT URINE PROTEIN: NORMAL
SL AMB POCT UROBILINOGEN: NORMAL

## 2020-02-11 PROCEDURE — 3066F NEPHROPATHY DOC TX: CPT

## 2020-02-11 PROCEDURE — 3044F HG A1C LEVEL LT 7.0%: CPT

## 2020-02-11 PROCEDURE — 4040F PNEUMOC VAC/ADMIN/RCVD: CPT

## 2020-02-11 PROCEDURE — 81003 URINALYSIS AUTO W/O SCOPE: CPT

## 2020-02-11 PROCEDURE — 51720 TREATMENT OF BLADDER LESION: CPT

## 2020-02-11 PROCEDURE — 3077F SYST BP >= 140 MM HG: CPT

## 2020-02-11 PROCEDURE — 3008F BODY MASS INDEX DOCD: CPT

## 2020-02-11 PROCEDURE — 3080F DIAST BP >= 90 MM HG: CPT

## 2020-02-11 NOTE — PROGRESS NOTES
2/11/2020    [address] [place] [address]  1940  [id number]    Diagnosis  Chief Complaint     Bladder Cancer          Patient presents for BCG 6 of 6 managed by Dr Andre Mcnair This Encounter   Procedures    POCT urine dip auto non-scope       Patient instructed to call with persistent hematuria, fever, or other concerns  Procedure BCG treatment 6 of 6  Vitals:    02/11/20 1159   Temp: 98 °F (36 7 °C)   TempSrc: Tympanic   Weight: 90 8 kg (200 lb 2 8 oz)   Height: 5' 8" (1 727 m)       Recent Results (from the past 4 hour(s))   POCT urine dip auto non-scope    Collection Time: 02/11/20 11:56 AM   Result Value Ref Range     COLOR,UA SHAYNE     CLARITY,UA CLEAR     SPECIFIC GRAVITY,UA 1 025      PH,UA 5 5     LEUKOCYTE ESTERASE,UA NEGATIVE     NITRITE,UA NEGATIVE     GLUCOSE, UA NEGATIVE     KETONES,UA NEGATIVE     BILIRUBIN,UA NEGATIVE     BLOOD,UA NEGATIVE     POCT URINE PROTEIN 100 mg/dL     SL AMB POCT UROBILINOGEN 0 2 E U /dL            Procedures    Sterile technique employed  Patient prepped and identified  #14F coude'-tip red rubber straight catheter placed  Bladder drained, residual approximately 0 mL  The following solution was instilled directly into the bladder via catheter: 1 Vial BCG  Catheter removed  Patient discharged  Patient tolerated procedure well  Pt understands all instructions            [employer]

## 2020-02-13 ENCOUNTER — OFFICE VISIT (OUTPATIENT)
Dept: INTERNAL MEDICINE CLINIC | Facility: CLINIC | Age: 80
End: 2020-02-13
Payer: MEDICARE

## 2020-02-13 VITALS
TEMPERATURE: 98.1 F | WEIGHT: 201 LBS | BODY MASS INDEX: 30.46 KG/M2 | DIASTOLIC BLOOD PRESSURE: 80 MMHG | HEART RATE: 67 BPM | OXYGEN SATURATION: 99 % | HEIGHT: 68 IN | SYSTOLIC BLOOD PRESSURE: 130 MMHG

## 2020-02-13 DIAGNOSIS — E11.59 TYPE 2 DIABETES MELLITUS WITH OTHER CIRCULATORY COMPLICATION, WITHOUT LONG-TERM CURRENT USE OF INSULIN (HCC): Primary | ICD-10-CM

## 2020-02-13 DIAGNOSIS — D49.4 BLADDER TUMOR: ICD-10-CM

## 2020-02-13 DIAGNOSIS — K20.90 BARRETT'S ESOPHAGUS WITH ESOPHAGITIS: ICD-10-CM

## 2020-02-13 DIAGNOSIS — E66.9 OBESITY (BMI 30.0-34.9): ICD-10-CM

## 2020-02-13 DIAGNOSIS — I10 BENIGN ESSENTIAL HYPERTENSION: ICD-10-CM

## 2020-02-13 DIAGNOSIS — Z12.11 COLON CANCER SCREENING: ICD-10-CM

## 2020-02-13 DIAGNOSIS — E78.2 MIXED HYPERLIPIDEMIA: ICD-10-CM

## 2020-02-13 DIAGNOSIS — K22.70 BARRETT'S ESOPHAGUS WITH ESOPHAGITIS: ICD-10-CM

## 2020-02-13 DIAGNOSIS — Z00.00 MEDICARE ANNUAL WELLNESS VISIT, SUBSEQUENT: ICD-10-CM

## 2020-02-13 PROCEDURE — 1036F TOBACCO NON-USER: CPT | Performed by: INTERNAL MEDICINE

## 2020-02-13 PROCEDURE — 3075F SYST BP GE 130 - 139MM HG: CPT | Performed by: INTERNAL MEDICINE

## 2020-02-13 PROCEDURE — 3008F BODY MASS INDEX DOCD: CPT | Performed by: INTERNAL MEDICINE

## 2020-02-13 PROCEDURE — 1160F RVW MEDS BY RX/DR IN RCRD: CPT | Performed by: INTERNAL MEDICINE

## 2020-02-13 PROCEDURE — 99214 OFFICE O/P EST MOD 30 MIN: CPT | Performed by: INTERNAL MEDICINE

## 2020-02-13 PROCEDURE — 3066F NEPHROPATHY DOC TX: CPT | Performed by: INTERNAL MEDICINE

## 2020-02-13 PROCEDURE — 3079F DIAST BP 80-89 MM HG: CPT | Performed by: INTERNAL MEDICINE

## 2020-02-13 PROCEDURE — 4040F PNEUMOC VAC/ADMIN/RCVD: CPT | Performed by: INTERNAL MEDICINE

## 2020-02-13 PROCEDURE — 3044F HG A1C LEVEL LT 7.0%: CPT | Performed by: INTERNAL MEDICINE

## 2020-02-13 NOTE — ASSESSMENT & PLAN NOTE
Patient is undergoing treatment for his bladder tumor, BCG  Patient states he has finished a 6 week course of treatment    He will continue surveillance by his urologist

## 2020-02-13 NOTE — PATIENT INSTRUCTIONS
Calorie Counting Diet   WHAT YOU NEED TO KNOW:   What is a calorie counting diet? It is a meal plan based on counting calories each day to reach a healthy body weight  You will need to eat fewer calories if you are trying to lose weight  Weight loss may decrease your risk for certain health problems or improve your health if you have health problems  Some of these health problems include heart disease, high blood pressure, and diabetes  What foods should I avoid? Your dietitian will tell you if you need to avoid certain foods based on your body weight and health condition  You may need to avoid high-fat foods if you are at risk for or have heart disease  You may need to eat fewer foods from the breads and starches food group if you have diabetes  How many calories are in foods? The following is a list of foods and drinks with the approximate number of calories in each  Check the food label to find the exact number of calories  A dietitian can tell you how many calories you should have from each food group each day    · Carbohydrate:      ¨ ½ of a 3-inch bagel, 1 slice of bread, or ½ of a hamburger bun or hot dog bun (80)    ¨ 1 (8-inch) flour tortilla or ½ cup of cooked rice (100)    ¨ 1 (6-inch) corn tortilla (80)    ¨ 1 (6-inch) pancake or 1 cup of bran flakes cereal (110)    ¨ ½ cup of cooked cereal (80)    ¨ ½ cup of cooked pasta (85)    ¨ 1 ounce of pretzels (100)    ¨ 3 cups of air-popped popcorn without butter or oil (80)    · Dairy:      ¨ 1 cup of skim or 1% milk (90)    ¨ 1 cup of 2% milk (120)    ¨ 1 cup of whole milk (160)    ¨ 1 cup of 2% chocolate milk (220)    ¨ 1 ounce of low-fat cheese with 3 grams of fat per ounce (70)    ¨ 1 ounce of cheddar cheese (114)    ¨ ½ cup of 1% fat cottage cheese (80)    ¨ 1 cup of plain or sugar-free, fat-free yogurt (90)    · Protein foods:      ¨ 3 ounces of fish (not breaded or fried) (95)    ¨ 3 ounces of breaded, fried fish (195)    ¨ ¾ cup of tuna canned in water (105)    ¨ 3 ounces of chicken breast without skin (105)    ¨ 1 fried chicken breast with skin (350)    ¨ ¼ cup of fat free egg substitute (40)    ¨ 1 large egg (75)    ¨ 3 ounces of lean beef or pork (165)    ¨ 3 ounces of fried pork chop or ham (185)    ¨ ½ cup of cooked dried beans, such as kidney, turner, lentils, or navy (115)    ¨ 3 ounces of bologna or lunch meat (225)    ¨ 2 links of breakfast sausage (140)    · Vegetables:      ¨ ½ cup of sliced mushrooms (10)    ¨ 1 cup of salad greens, such as lettuce, spinach, or brice (15)    ¨ ½ cup of steamed asparagus (20)    ¨ ½ cup of cooked summer squash, zucchini squash, or green or wax beans (25)    ¨ 1 cup of broccoli or cauliflower florets, or 1 medium tomato (25)    ¨ 1 large raw carrot or ½ cup of cooked carrots (40)    ¨ ? of a medium cucumber or 1 stalk of celery (5)    ¨ 1 small baked potato (160)    ¨ 1 cup of breaded, fried vegetables (230)    · Fruit:      ¨ 1 (6-inch) banana (55)     ¨ ½ of a 4-inch grapefruit (55)    ¨ 15 grapes (60)    ¨ 1 medium orange or apple (70)    ¨ 1 large peach (65)    ¨ 1 cup of fresh pineapple chunks (75)    ¨ 1 cup of melon cubes (50)    ¨ 1¼ cups of whole strawberries (45)    ¨ ½ cup of fruit canned in juice (55)    ¨ ½ cup of fruit canned in heavy syrup (110)    ¨ ?  cup of raisins (130)    ¨ ½ cup of unsweetened fruit juice (60)    ¨ ½ cup of grape, cranberry, or prune juice (90)    · Fat:      ¨ 10 peanuts or 2 teaspoons of peanut butter (55)    ¨ 2 tablespoons of avocado or 1 tablespoon of regular salad dressing (45)    ¨ 2 slices of torres (90)    ¨ 1 teaspoon of oil, such as safflower, canola, corn, or olive oil (45)    ¨ 2 teaspoons of low-fat margarine, or 1 tablespoon of low-fat mayonnaise (50)    ¨ 1 teaspoon of regular margarine (40)    ¨ 1 tablespoon of regular mayonnaise (135)    ¨ 1 tablespoon of cream cheese or 2 tablespoons of low-fat cream cheese (45)    ¨ 2 tablespoons of vegetable shortening (215)    · Dessert and sweets:      ¨ 8 animal crackers or 5 vanilla wafers (80)    ¨ 1 frozen fruit juice bar (80)    ¨ ½ cup of ice milk or low-fat frozen yogurt (90)    ¨ ½ cup of sherbet or sorbet (125)    ¨ ½ cup of sugar-free pudding or custard (60)    ¨ ½ cup of ice cream (140)    ¨ ½ cup of pudding or custard (175)    ¨ 1 (2-inch) square chocolate brownie (185)    · Combination foods:      ¨ Bean burrito made with an 8-inch tortilla, without cheese (275)    ¨ Chicken breast sandwich with lettuce and tomato (325)    ¨ 1 cup of chicken noodle soup (60)    ¨ 1 beef taco (175)    ¨ Regular hamburger with lettuce and tomato (310)    ¨ Regular cheeseburger with lettuce and tomato (410)     ¨ ¼ of a 12-inch cheese pizza (280)    ¨ Fried fish sandwich with lettuce and tomato (425)    ¨ Hot dog and bun (275)    ¨ 1½ cups of macaroni and cheese (310)    ¨ Taco salad with a fried tortilla shell (870)    · Low-calorie foods:      ¨ 1 tablespoon of ketchup or 1 tablespoon of fat free sour cream (15)    ¨ 1 teaspoon of mustard (5)    ¨ ¼ cup of salsa (20)    ¨ 1 large dill pickle (15)    ¨ 1 tablespoon of fat free salad dressing (10)    ¨ 2 teaspoons of low-sugar, light jam or jelly, or 1 tablespoon of sugar-free syrup (15)    ¨ 1 sugar-free popsicle (15)    ¨ 1 cup of club soda, seltzer water, or diet soda (0)  CARE AGREEMENT:   You have the right to help plan your care  Discuss treatment options with your caregivers to decide what care you want to receive  You always have the right to refuse treatment  The above information is an  only  It is not intended as medical advice for individual conditions or treatments  Talk to your doctor, nurse or pharmacist before following any medical regimen to see if it is safe and effective for you  © 2017 [employer] Information is for End User's use only and may not be sold, redistributed or otherwise used for commercial purposes   All illustrations and images included in CareNotes® are the copyrighted property of A [employer]  or [employer]

## 2020-02-13 NOTE — PROGRESS NOTES
BMI Counseling: Body mass index is 30 56 kg/m²  The BMI is above normal  Nutrition recommendations include reducing portion sizes, decreasing overall calorie intake, 3-5 servings of fruits/vegetables daily, moderation in carbohydrate intake, increasing intake of lean protein, reducing intake of saturated fat and trans fat and reducing intake of cholesterol  Exercise recommendations include moderate aerobic physical activity for 150 minutes/week

## 2020-02-13 NOTE — ASSESSMENT & PLAN NOTE
Patient does have a history of hyperlipidemia, coronary artery disease  Patient remains on Zocor and omega-3 fatty acids  Will check a lipid profile with his next visit

## 2020-02-13 NOTE — PROGRESS NOTES
Assessment/Plan:    DMII (diabetes mellitus, type 2) ([facility] [address] )  Patient did have labs performed prior to the visit today and we did discuss the results  Again patient's sugar shows good control with present treatment with a hemoglobin A1c of 6 5  Patient will continue present medication  We will check a fasting blood sugar, hemoglobin A1c, urine for microalbumin with his next visit  Lab Results   Component Value Date    HGBA1C 6 5 (H) 02/04/2020       Wright's esophagus with esophagitis  Patient is having no new GI symptoms or problems  He continues to follow-up and has chronic surveillance with his gastroenterologist     Benign essential hypertension  As noted patient's blood pressure is variable  Initially with presentation to the office his blood pressure was 156/90  When this was read checked after the patient was allowed to relax for approximately 20 minutes it did come down to 135/80 which is acceptable  Patient will continue present medication and surveillance  We will check on his renal function again with his next visit  Bladder tumor  Patient is undergoing treatment for his bladder tumor, BCG  Patient states he has finished a 6 week course of treatment  He will continue surveillance by his urologist    Medicare annual wellness visit, subsequent  Patient will be due for repeat Medicare wellness visit when he returns to the office in 4 months  Patient was given a slip for complete labs to be performed prior to that visit  We will also gave the patient order to have a stool test performed and patient is agreeable to this  Hyperlipidemia  Patient does have a history of hyperlipidemia, coronary artery disease  Patient remains on Zocor and omega-3 fatty acids  Will check a lipid profile with his next visit  Obesity (BMI 30 0-34  9)  Patient is had no appreciable improvement with his weight since his last visit    Again we did have a long discussion about his weight the fact that he needs to watch his caloric intake in order to help lose weight  He states he has no regular exercise program and he was told he has to find some type of physical activity where can be more active to burn off calories       Diagnoses and all orders for this visit:    Type 2 diabetes mellitus with other circulatory complication, without long-term current use of insulin ([facility] [address] )  -     Comprehensive metabolic panel; Future  -     CBC and differential; Future  -     Lipid panel; Future  -     UA (URINE) with reflex to Scope; Future  -     TSH, 3rd generation with Free T4 reflex; Future  -     Hemoglobin A1C; Future  -     Microalbumin / creatinine urine ratio    Benign essential hypertension  -     Comprehensive metabolic panel; Future  -     CBC and differential; Future  -     Lipid panel; Future  -     TSH, 3rd generation with Free T4 reflex; Future    Bladder tumor    Mixed hyperlipidemia  -     Comprehensive metabolic panel; Future  -     Lipid panel; Future  -     TSH, 3rd generation with Free T4 reflex; Future    Medicare annual wellness visit, subsequent  -     Comprehensive metabolic panel; Future  -     CBC and differential; Future  -     Lipid panel; Future  -     UA (URINE) with reflex to Scope; Future  -     TSH, 3rd generation with Free T4 reflex; Future    Colon cancer screening  -     Occult Blood, Fecal Immunochemical; Future    Wright's esophagus with esophagitis    Obesity (BMI 30 0-34  9)    BMI 30 0-30 9,adult          Subjective:      Patient ID: Satinder Thomas is a 78 y o  male  Patient is a 22-year-old male with a history of multiple medical problems including hypertension, hyperlipidemia, diabetes mellitus type 2, DJD, exogenous obesity  Patient is here today for follow-up after a four-month period of time  He did have labs performed prior to being seen today we did discuss the results  Patient's sugar showing good control and also his renal function    Patient further relates that he is undergoing treatment for his recurrent bladder tumor, BCG installation  He denies any chest pain no increasing shortness of breath with exertion  He states his appetite is adequate but he is not always watching his diet intake      The following portions of the patient's history were reviewed and updated as appropriate:   He  has a past medical history of Acute kidney injury ([address] ), Arthritis, Wright esophagus, BPH with obstruction/lower urinary tract symptoms, CAD (coronary artery disease), Cataract, acquired, Diabetes mellitus ([address] ), Diabetic neuropathy ([address] ), Enlarged prostate with lower urinary tract symptoms (LUTS), Erectile dysfunction, GERD (gastroesophageal reflux disease), Hemoptysis, Hypercholesterolemia, Hypertension, Macular degeneration, Myocardial infarction ([address] ) (1998), OAB (overactive bladder), Testicular hypofunction, Testicular hypogonadism, Umbilical hernia, Urge incontinence of urine, and Wears glasses    He   Patient Active Problem List    Diagnosis Date Noted    Positive urinary cytology 11/05/2019    Coronary artery disease involving native coronary artery of native heart without angina pectoris 09/09/2019    Ischemic cardiomyopathy 09/09/2019    History of bladder cancer 07/30/2019    Medicare annual wellness visit, subsequent 05/23/2019    Closed fracture of upper end of right fibula 03/11/2019    Obesity (BMI 30 0-34 9) 01/22/2019    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 01/10/2019    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/2019    Type 2 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 ([address] ) 12/05/2018    Bladder tumor 11/01/2018    Overactive bladder 10/10/2018    Acute kidney injury ([address] ) 05/30/2018    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    Backache 10/21/2013    Benign essential hypertension 10/11/2012    DMII (diabetes mellitus, type 2) ([address] ) 10/11/2012    Hyperlipidemia 10/11/2012     He  has a past surgical history that includes pr colonoscopy flx dx w/collj spec when pfrmd (N/A, 4/25/2016); Cystoscopy (2013); Coronary artery bypass graft (07/16/2014); Colonoscopy; Inguinal hernia repair (Bilateral, 2015); Umbilical hernia repair (2012); Esophagogastroduodenoscopy; Tonsillectomy; Photodynamic Therapy; Transurethral resection of prostate (N/A, 12/20/2018); [place] retrograde pyelogram (12/20/2018); pr cystourethroscopy,fulgur <0 5 cm lesn (N/A, 12/5/2019); and [place] retrograde pyelogram (12/5/2019)  His family history includes Cancer in his mother; Coronary artery disease in his father; Diabetes in his father; Heart disease in his father; Hyperlipidemia in his father; Hypertension in his father; Other in his mother  He  reports that he quit smoking about 48 years ago  His smoking use included cigarettes  He has a 13 00 pack-year smoking history  He has never used smokeless tobacco  He reports that he drinks about 2 0 standard drinks of alcohol per week  He reports that he does not use drugs  Current Outpatient Medications   Medication Sig Dispense Refill    irbesartan (AVAPRO) 300 mg tablet TAKE ONE TABLET BY MOUTH EVERY DAY 90 tablet 3    metFORMIN (GLUCOPHAGE-XR) 500 mg 24 hr tablet TAKE TWO TABLETS BY MOUTH TWICE A DAY WITH MEALS 360 tablet 0    metoprolol tartrate (LOPRESSOR) 50 mg tablet Take 1 tablet (50 mg total) by mouth 2 (two) times a day 180 tablet 3    Multiple Vitamins-Minerals (CENTRUM SILVER 50+MEN PO) Take 1 tablet by mouth daily        Multiple Vitamins-Minerals (OCUVITE-LUTEIN PO) Take 1 tablet by mouth 2 (two) times a day        NON FORMULARY       Omega-3 Fatty Acids (FISH OIL) 1200 MG CAPS Take 2 capsules by mouth daily       omeprazole (PriLOSEC) 40 MG capsule Take 40 mg by mouth daily at bedtime        simvastatin (ZOCOR) 20 mg tablet Take 20 mg by mouth daily at bedtime      VESICARE 10 MG tablet TAKE ONE TABLET BY MOUTH EVERY DAY 90 tablet 2    aspirin 81 MG tablet Take 81 mg by mouth daily         No current facility-administered medications for this visit  Current Outpatient Medications on File Prior to Visit   Medication Sig    irbesartan (AVAPRO) 300 mg tablet TAKE ONE TABLET BY MOUTH EVERY DAY    metFORMIN (GLUCOPHAGE-XR) 500 mg 24 hr tablet TAKE TWO TABLETS BY MOUTH TWICE A DAY WITH MEALS    metoprolol tartrate (LOPRESSOR) 50 mg tablet Take 1 tablet (50 mg total) by mouth 2 (two) times a day    Multiple Vitamins-Minerals (CENTRUM SILVER 50+MEN PO) Take 1 tablet by mouth daily      Multiple Vitamins-Minerals (OCUVITE-LUTEIN PO) Take 1 tablet by mouth 2 (two) times a day      NON FORMULARY     Omega-3 Fatty Acids (FISH OIL) 1200 MG CAPS Take 2 capsules by mouth daily     omeprazole (PriLOSEC) 40 MG capsule Take 40 mg by mouth daily at bedtime      simvastatin (ZOCOR) 20 mg tablet Take 20 mg by mouth daily at bedtime    VESICARE 10 MG tablet TAKE ONE TABLET BY MOUTH EVERY DAY    aspirin 81 MG tablet Take 81 mg by mouth daily  No current facility-administered medications on file prior to visit  He is allergic to morphine and related       Review of Systems   Constitutional: Negative  HENT: Negative  Eyes: Positive for visual disturbance (Does have a history of macular degeneration  States no recent visual changes)  Negative for photophobia, pain, discharge, redness and itching  Respiratory: Negative  Cardiovascular: Negative  Gastrointestinal: Negative  Endocrine: Negative  Genitourinary: Negative  Musculoskeletal: Positive for arthralgias  Negative for back pain, gait problem, joint swelling, myalgias, neck pain and neck stiffness  Allergic/Immunologic: Negative  Hematological: Negative  Psychiatric/Behavioral: Negative  Objective:      /80   Pulse 67   Temp 98 1 °F (36 7 °C)   Ht 5' 8" (1 727 m)   Wt 91 2 kg (201 lb)   SpO2 99%   BMI 30 56 kg/m²          Physical Exam   Constitutional: He is oriented to person, place, and time   He appears well-developed and well-nourished  No distress  Pleasant, obese, cheerful 59-year-old male who is awake alert no acute distress and oriented x3   HENT:   Head: Normocephalic and atraumatic  Right Ear: External ear normal    Left Ear: External ear normal    Nose: Nose normal    Mouth/Throat: Oropharynx is clear and moist  No oropharyngeal exudate  Eyes: Pupils are equal, round, and reactive to light  Conjunctivae and EOM are normal  Right eye exhibits no discharge  Left eye exhibits no discharge  No scleral icterus  Neck: Normal range of motion  Neck supple  No JVD present  No tracheal deviation present  No thyromegaly present  Cardiovascular: Normal rate, regular rhythm, normal heart sounds and intact distal pulses  Exam reveals no gallop and no friction rub  No murmur heard  Pulmonary/Chest: Effort normal and breath sounds normal  No stridor  No respiratory distress  He has no wheezes  He has no rales  He exhibits no tenderness  Abdominal: Soft  Bowel sounds are normal  He exhibits no distension and no mass  There is no tenderness  There is no rebound and no guarding  No hernia  Obese   Lymphadenopathy:     He has no cervical adenopathy  Neurological: He is alert and oriented to person, place, and time  He displays abnormal reflex ( absent patella and Achilles tendon reflexes bilaterally)  No cranial nerve deficit or sensory deficit  He exhibits normal muscle tone  Coordination normal    Skin: Skin is warm and dry  No rash noted  He is not diaphoretic  No erythema  No pallor  Psychiatric: He has a normal mood and affect  His behavior is normal  Judgment and thought content normal    Nursing note and vitals reviewed

## 2020-02-13 NOTE — ASSESSMENT & PLAN NOTE
Patient did have labs performed prior to the visit today and we did discuss the results  Again patient's sugar shows good control with present treatment with a hemoglobin A1c of 6 5  Patient will continue present medication  We will check a fasting blood sugar, hemoglobin A1c, urine for microalbumin with his next visit    Lab Results   Component Value Date    HGBA1C 6 5 (H) 02/04/2020

## 2020-02-13 NOTE — ASSESSMENT & PLAN NOTE
Patient is had no appreciable improvement with his weight since his last visit  Again we did have a long discussion about his weight the fact that he needs to watch his caloric intake in order to help lose weight    He states he has no regular exercise program and he was told he has to find some type of physical activity where can be more active to burn off calories

## 2020-02-13 NOTE — ASSESSMENT & PLAN NOTE
Patient is having no new GI symptoms or problems    He continues to follow-up and has chronic surveillance with his gastroenterologist

## 2020-02-13 NOTE — ASSESSMENT & PLAN NOTE
Patient will be due for repeat Medicare wellness visit when he returns to the office in 4 months  Patient was given a slip for complete labs to be performed prior to that visit  We will also gave the patient order to have a stool test performed and patient is agreeable to this

## 2020-02-13 NOTE — ASSESSMENT & PLAN NOTE
As noted patient's blood pressure is variable  Initially with presentation to the office his blood pressure was 156/90  When this was read checked after the patient was allowed to relax for approximately 20 minutes it did come down to 135/80 which is acceptable  Patient will continue present medication and surveillance  We will check on his renal function again with his next visit

## 2020-03-19 ENCOUNTER — TELEPHONE (OUTPATIENT)
Dept: UROLOGY | Facility: MEDICAL CENTER | Age: 80
End: 2020-03-19
Payer: MEDICARE

## 2020-03-19 DIAGNOSIS — C67.9 MALIGNANT NEOPLASM OF URINARY BLADDER, UNSPECIFIED SITE (HCC): Primary | ICD-10-CM

## 2020-03-19 NOTE — TELEPHONE ENCOUNTER
Spoke to pt and advised per Dr Bouchra Naidu about a week before his next appt 5/1 to have a cytology urine test done  Script is in epic  If anything changes and pt cannot get to the lab due to the VOVID 19 virus he will call the office

## 2020-03-20 NOTE — TELEPHONE ENCOUNTER
Spoke to pt who was concerned about delaying his BCG treatment for his bladder cancer  Patient has been RS 3 times this last time due to the COVID 19 virus  To put the pt's mind at ease scheduled an appt 3/24 at 11a  Will talk to Dr Addi Bradshaw on Monday about the appt

## 2020-03-20 NOTE — TELEPHONE ENCOUNTER
Patient called in regard to the outcome of the treatments   Last appointment was noted to be 2/11  He would like to speak with someone regarding the next step  He voiced concern in regard to the delay in his appointments    He would like an earlier appointment if at all possible  Please call at [phone] to discuss  Thank you

## 2020-03-24 ENCOUNTER — PROCEDURE VISIT (OUTPATIENT)
Dept: UROLOGY | Facility: MEDICAL CENTER | Age: 80
End: 2020-03-24
Payer: MEDICARE

## 2020-03-24 ENCOUNTER — TELEPHONE (OUTPATIENT)
Dept: UROLOGY | Facility: MEDICAL CENTER | Age: 80
End: 2020-03-24

## 2020-03-24 VITALS — WEIGHT: 201 LBS | HEIGHT: 68 IN | BODY MASS INDEX: 30.46 KG/M2

## 2020-03-24 DIAGNOSIS — C67.5 MALIGNANT NEOPLASM OF URINARY BLADDER NECK (HCC): ICD-10-CM

## 2020-03-24 DIAGNOSIS — C67.9 MALIGNANT NEOPLASM OF URINARY BLADDER, UNSPECIFIED SITE (HCC): Primary | ICD-10-CM

## 2020-03-24 LAB
SL AMB  POCT GLUCOSE, UA: ABNORMAL
SL AMB LEUKOCYTE ESTERASE,UA: ABNORMAL
SL AMB POCT BILIRUBIN,UA: ABNORMAL
SL AMB POCT BLOOD,UA: ABNORMAL
SL AMB POCT CLARITY,UA: CLEAR
SL AMB POCT COLOR,UA: YELLOW
SL AMB POCT KETONES,UA: ABNORMAL
SL AMB POCT NITRITE,UA: ABNORMAL
SL AMB POCT PH,UA: 6
SL AMB POCT SPECIFIC GRAVITY,UA: 1.01
SL AMB POCT URINE PROTEIN: ABNORMAL
SL AMB POCT UROBILINOGEN: 0.2

## 2020-03-24 PROCEDURE — 88112 CYTOPATH CELL ENHANCE TECH: CPT | Performed by: PATHOLOGY

## 2020-03-24 PROCEDURE — 52000 CYSTOURETHROSCOPY: CPT | Performed by: UROLOGY

## 2020-03-24 PROCEDURE — 81003 URINALYSIS AUTO W/O SCOPE: CPT | Performed by: UROLOGY

## 2020-03-24 NOTE — TELEPHONE ENCOUNTER
I spoke to the patient and scheduled his TURBT with Dr Alberts Brood 4/16/2020 at [facility] ([address] ordered)    -instructions given verbally and mailed  -patient needs CBC, CMP, Urine C&S and EKG  -patient will stop his Aspirin 7 days prior  -no clearances ordered  -MCR/Cap BC - No Auth required

## 2020-03-24 NOTE — PROGRESS NOTES
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)  Recurrent neoplasm is described in the today's cystoscopy note  The patient will require resection however this is not urgent in view of the Covid-19 emergency  To wait much longer than that  This can wait 4-6 weeks  The patient has a history of high-grade neoplasm and I would not like him to wait any longer than that  We discussed this in detail  The patient agrees with this plan of care  Diagnoses and all orders for this visit:    Malignant neoplasm of urinary bladder, unspecified site ([facility] [place] [address] )  -     POCT urine dip auto non-scope    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)          Subjective:      Patient ID: Adis Faustin is a 78 y o  male  HPI  Bladder cancer: On December 5, 2019 the patient underwent transurethral resection of a bladder tumor  This proved to be high-grade noninvasive urothelial carcinoma  Muscularis was negative  He had a similar tumor in December 2018  The patient had a course of BCG  He completed approximately 6 weeks ago  He returns today for surveillance cystoscopy and urine cytology  The patient has recurrent neoplasm as described below  Procedures  MALE FLEXIBLE CYSTOSCOPY    Preoperative diagnosis:  History of bladder cancer, evaluation for recurrence    Postoperative diagnosis:  Recurrent bladder cancer    Operation:  Flexible cystoscopy    Surgeon:  Mali Lopez MD,FACS    Anesthesia:  Xylocaine jelly    Procedure:  Patient was brought to the cystoscopy room and positively identified  The patient was prepped and draped in sterile fashion  Ten mL of 1% xylocaine jelly was instilled into the urethra  A penile clamp was applied  The flexible cystoscope was inserted  Findings are as follows:    Penile Urethra:  normal  Bulbar Urethra:  normal  Prostate:  Occlusive at the apex for approximately 1 cm  More proximal prostate tissue has been resected    Bladder: Bladder Neck:  There is shaggy broad papillary tumor just inside the bladder neck from the 11 o'clock position to the 1 o'clock position  From the bladder neck, this progresses approximately 5-10 mm into the bladder itself  Ureteral orifices:   Normal  Mucosa: There are areas of bullous edema and club-shaped papillations scattered around the bladder  These appeared to be more likely inflammatory than neoplastic  Trabeculation:  severe  PVR:  Small  Other findings: The cystoscope was removed  The patient tolerated the procedure well  Following additional consultation to review the findings with the patient, he was discharged from the office in satisfactory condition  Impression:  Recurrent bladder tumor just inside the bladder neck anteriorly as described above  The following portions of the patient's history were reviewed and updated as appropriate: allergies, current medications, past family history, past medical history, past social history, past surgical history and problem list     Review of Systems    Constitutional: Negative for activity change and fatigue  Respiratory: Negative for shortness of breath and wheezing     Cardiovascular: Negative for chest pain         Hypertension    Gastrointestinal: Negative for abdominal pain  Endocrine:        Non-insulin dependent diabetes    Genitourinary: Negative for difficulty urinating, dysuria, frequency, hematuria and urgency  Musculoskeletal: Negative for back pain and gait problem     Skin: Negative     Allergic/Immunologic: Negative     Neurological: Negative     Psychiatric/Behavioral: Negative  Objective:      Ht 5' 8" (1 727 m)   Wt 91 2 kg (201 lb)   BMI 30 56 kg/m²          Physical Exam

## 2020-03-24 NOTE — ASSESSMENT & PLAN NOTE
Recurrent neoplasm is described in the today's cystoscopy note  The patient will require resection however this is not urgent in view of the Covid-19 emergency  To wait much longer than that  This can wait 4-6 weeks  The patient has a history of high-grade neoplasm and I would not like him to wait any longer than that  We discussed this in detail  The patient agrees with this plan of care

## 2020-03-31 RX ORDER — CHOLECALCIFEROL (VITAMIN D3) 50 MCG
2000 TABLET ORAL DAILY
COMMUNITY

## 2020-04-01 LAB
LEFT EYE DIABETIC RETINOPATHY: NORMAL
RIGHT EYE DIABETIC RETINOPATHY: NORMAL

## 2020-04-08 ENCOUNTER — APPOINTMENT (OUTPATIENT)
Dept: LAB | Facility: HOSPITAL | Age: 80
End: 2020-04-08
Attending: UROLOGY
Payer: MEDICARE

## 2020-04-08 DIAGNOSIS — C67.5 MALIGNANT NEOPLASM OF URINARY BLADDER NECK (HCC): ICD-10-CM

## 2020-04-08 DIAGNOSIS — R39.89 SUSPECTED UTI: ICD-10-CM

## 2020-04-08 LAB
ALBUMIN SERPL BCP-MCNC: 3.7 G/DL (ref 3.5–5)
ALP SERPL-CCNC: 86 U/L (ref 46–116)
ALT SERPL W P-5'-P-CCNC: 37 U/L (ref 12–78)
ANION GAP SERPL CALCULATED.3IONS-SCNC: 4 MMOL/L (ref 4–13)
AST SERPL W P-5'-P-CCNC: 21 U/L (ref 5–45)
BASOPHILS # BLD AUTO: 0.05 THOUSANDS/ΜL (ref 0–0.1)
BASOPHILS NFR BLD AUTO: 1 % (ref 0–1)
BILIRUB SERPL-MCNC: 0.75 MG/DL (ref 0.2–1)
BUN SERPL-MCNC: 21 MG/DL (ref 5–25)
CALCIUM SERPL-MCNC: 8.8 MG/DL (ref 8.3–10.1)
CHLORIDE SERPL-SCNC: 109 MMOL/L (ref 100–108)
CO2 SERPL-SCNC: 28 MMOL/L (ref 21–32)
CREAT SERPL-MCNC: 1.26 MG/DL (ref 0.6–1.3)
EOSINOPHIL # BLD AUTO: 0.24 THOUSAND/ΜL (ref 0–0.61)
EOSINOPHIL NFR BLD AUTO: 2 % (ref 0–6)
ERYTHROCYTE [DISTWIDTH] IN BLOOD BY AUTOMATED COUNT: 14.2 % (ref 11.6–15.1)
GFR SERPL CREATININE-BSD FRML MDRD: 54 ML/MIN/1.73SQ M
GLUCOSE P FAST SERPL-MCNC: 156 MG/DL (ref 65–99)
HCT VFR BLD AUTO: 43.3 % (ref 36.5–49.3)
HGB BLD-MCNC: 14.5 G/DL (ref 12–17)
IMM GRANULOCYTES # BLD AUTO: 0.04 THOUSAND/UL (ref 0–0.2)
IMM GRANULOCYTES NFR BLD AUTO: 0 % (ref 0–2)
LYMPHOCYTES # BLD AUTO: 1.59 THOUSANDS/ΜL (ref 0.6–4.47)
LYMPHOCYTES NFR BLD AUTO: 16 % (ref 14–44)
MCH RBC QN AUTO: 31.2 PG (ref 26.8–34.3)
MCHC RBC AUTO-ENTMCNC: 33.5 G/DL (ref 31.4–37.4)
MCV RBC AUTO: 93 FL (ref 82–98)
MONOCYTES # BLD AUTO: 0.85 THOUSAND/ΜL (ref 0.17–1.22)
MONOCYTES NFR BLD AUTO: 9 % (ref 4–12)
NEUTROPHILS # BLD AUTO: 7.23 THOUSANDS/ΜL (ref 1.85–7.62)
NEUTS SEG NFR BLD AUTO: 72 % (ref 43–75)
NRBC BLD AUTO-RTO: 0 /100 WBCS
PLATELET # BLD AUTO: 168 THOUSANDS/UL (ref 149–390)
PMV BLD AUTO: 12.3 FL (ref 8.9–12.7)
POTASSIUM SERPL-SCNC: 3.8 MMOL/L (ref 3.5–5.3)
PROT SERPL-MCNC: 7.3 G/DL (ref 6.4–8.2)
RBC # BLD AUTO: 4.65 MILLION/UL (ref 3.88–5.62)
SODIUM SERPL-SCNC: 141 MMOL/L (ref 136–145)
WBC # BLD AUTO: 10 THOUSAND/UL (ref 4.31–10.16)

## 2020-04-08 PROCEDURE — 80053 COMPREHEN METABOLIC PANEL: CPT

## 2020-04-08 PROCEDURE — 85025 COMPLETE CBC W/AUTO DIFF WBC: CPT

## 2020-04-08 PROCEDURE — 36415 COLL VENOUS BLD VENIPUNCTURE: CPT

## 2020-04-08 PROCEDURE — 87086 URINE CULTURE/COLONY COUNT: CPT

## 2020-04-09 LAB — BACTERIA UR CULT: ABNORMAL

## 2020-04-13 ENCOUNTER — APPOINTMENT (OUTPATIENT)
Dept: LAB | Facility: CLINIC | Age: 80
End: 2020-04-13
Payer: MEDICARE

## 2020-04-13 DIAGNOSIS — Z12.11 COLON CANCER SCREENING: ICD-10-CM

## 2020-04-15 ENCOUNTER — ANESTHESIA EVENT (OUTPATIENT)
Dept: PERIOP | Facility: HOSPITAL | Age: 80
End: 2020-04-15
Payer: MEDICARE

## 2020-04-15 PROCEDURE — NC001 PR NO CHARGE: Performed by: UROLOGY

## 2020-04-16 ENCOUNTER — HOSPITAL ENCOUNTER (OUTPATIENT)
Facility: HOSPITAL | Age: 80
Setting detail: OUTPATIENT SURGERY
Discharge: HOME | End: 2020-04-16
Attending: UROLOGY | Admitting: UROLOGY
Payer: MEDICARE

## 2020-04-16 ENCOUNTER — ANESTHESIA (OUTPATIENT)
Dept: PERIOP | Facility: HOSPITAL | Age: 80
End: 2020-04-16
Payer: MEDICARE

## 2020-04-16 ENCOUNTER — TELEPHONE (OUTPATIENT)
Dept: UROLOGY | Facility: MEDICAL CENTER | Age: 80
End: 2020-04-16

## 2020-04-16 VITALS
SYSTOLIC BLOOD PRESSURE: 152 MMHG | WEIGHT: 195 LBS | RESPIRATION RATE: 16 BRPM | TEMPERATURE: 97.9 F | HEART RATE: 60 BPM | DIASTOLIC BLOOD PRESSURE: 77 MMHG | OXYGEN SATURATION: 97 % | BODY MASS INDEX: 29.55 KG/M2 | HEIGHT: 68 IN

## 2020-04-16 DIAGNOSIS — C67.5 MALIGNANT NEOPLASM OF URINARY BLADDER NECK (HCC): ICD-10-CM

## 2020-04-16 LAB
GLUCOSE SERPL-MCNC: 150 MG/DL (ref 65–140)
GLUCOSE SERPL-MCNC: 165 MG/DL (ref 65–140)

## 2020-04-16 PROCEDURE — 52235 CYSTOSCOPY AND TREATMENT: CPT | Performed by: UROLOGY

## 2020-04-16 PROCEDURE — NC001 PR NO CHARGE: Performed by: UROLOGY

## 2020-04-16 PROCEDURE — 88305 TISSUE EXAM BY PATHOLOGIST: CPT | Performed by: PATHOLOGY

## 2020-04-16 PROCEDURE — 82948 REAGENT STRIP/BLOOD GLUCOSE: CPT

## 2020-04-16 RX ORDER — FENTANYL CITRATE/PF 50 MCG/ML
25 SYRINGE (ML) INJECTION
Status: DISCONTINUED | OUTPATIENT
Start: 2020-04-16 | End: 2020-04-16 | Stop reason: HOSPADM

## 2020-04-16 RX ORDER — PROPOFOL 10 MG/ML
INJECTION, EMULSION INTRAVENOUS AS NEEDED
Status: DISCONTINUED | OUTPATIENT
Start: 2020-04-16 | End: 2020-04-16 | Stop reason: SURG

## 2020-04-16 RX ORDER — ONDANSETRON 2 MG/ML
4 INJECTION INTRAMUSCULAR; INTRAVENOUS EVERY 6 HOURS PRN
Status: DISCONTINUED | OUTPATIENT
Start: 2020-04-16 | End: 2020-04-16 | Stop reason: HOSPADM

## 2020-04-16 RX ORDER — PHENAZOPYRIDINE HYDROCHLORIDE 200 MG/1
200 TABLET, FILM COATED ORAL 3 TIMES DAILY PRN
Qty: 10 TABLET | Refills: 0 | Status: ON HOLD | OUTPATIENT
Start: 2020-04-16 | End: 2020-09-10 | Stop reason: SDUPTHER

## 2020-04-16 RX ORDER — SORBITOL 30 G/1000ML
IRRIGANT IRRIGATION AS NEEDED
Status: DISCONTINUED | OUTPATIENT
Start: 2020-04-16 | End: 2020-04-16 | Stop reason: HOSPADM

## 2020-04-16 RX ORDER — MIDAZOLAM HYDROCHLORIDE 2 MG/2ML
INJECTION, SOLUTION INTRAMUSCULAR; INTRAVENOUS AS NEEDED
Status: DISCONTINUED | OUTPATIENT
Start: 2020-04-16 | End: 2020-04-16 | Stop reason: SURG

## 2020-04-16 RX ORDER — CEFAZOLIN SODIUM 2 G/50ML
2000 SOLUTION INTRAVENOUS ONCE
Status: COMPLETED | OUTPATIENT
Start: 2020-04-16 | End: 2020-04-16

## 2020-04-16 RX ORDER — SODIUM CHLORIDE 9 MG/ML
125 INJECTION, SOLUTION INTRAVENOUS CONTINUOUS
Status: DISCONTINUED | OUTPATIENT
Start: 2020-04-16 | End: 2020-04-16 | Stop reason: HOSPADM

## 2020-04-16 RX ORDER — ATROPA BELLADONNA AND OPIUM 16.2; 3 MG/1; MG/1
1 SUPPOSITORY RECTAL EVERY 6 HOURS PRN
Status: DISCONTINUED | OUTPATIENT
Start: 2020-04-16 | End: 2020-04-16 | Stop reason: HOSPADM

## 2020-04-16 RX ORDER — CEPHALEXIN 500 MG/1
500 CAPSULE ORAL 3 TIMES DAILY
Qty: 15 CAPSULE | Refills: 0 | Status: SHIPPED | OUTPATIENT
Start: 2020-04-16 | End: 2020-04-21

## 2020-04-16 RX ORDER — OXYCODONE HYDROCHLORIDE 5 MG/1
5 TABLET ORAL EVERY 4 HOURS PRN
Status: DISCONTINUED | OUTPATIENT
Start: 2020-04-16 | End: 2020-04-16 | Stop reason: HOSPADM

## 2020-04-16 RX ORDER — HYDRALAZINE HYDROCHLORIDE 20 MG/ML
10 INJECTION INTRAMUSCULAR; INTRAVENOUS ONCE
Status: COMPLETED | OUTPATIENT
Start: 2020-04-16 | End: 2020-04-16

## 2020-04-16 RX ORDER — FENTANYL CITRATE 50 UG/ML
INJECTION, SOLUTION INTRAMUSCULAR; INTRAVENOUS AS NEEDED
Status: DISCONTINUED | OUTPATIENT
Start: 2020-04-16 | End: 2020-04-16 | Stop reason: SURG

## 2020-04-16 RX ORDER — MAGNESIUM HYDROXIDE 1200 MG/15ML
LIQUID ORAL AS NEEDED
Status: DISCONTINUED | OUTPATIENT
Start: 2020-04-16 | End: 2020-04-16 | Stop reason: HOSPADM

## 2020-04-16 RX ORDER — ACETAMINOPHEN 325 MG/1
650 TABLET ORAL EVERY 6 HOURS PRN
Status: DISCONTINUED | OUTPATIENT
Start: 2020-04-16 | End: 2020-04-16 | Stop reason: HOSPADM

## 2020-04-16 RX ORDER — ONDANSETRON 2 MG/ML
4 INJECTION INTRAMUSCULAR; INTRAVENOUS ONCE AS NEEDED
Status: DISCONTINUED | OUTPATIENT
Start: 2020-04-16 | End: 2020-04-16 | Stop reason: HOSPADM

## 2020-04-16 RX ORDER — PHENAZOPYRIDINE HYDROCHLORIDE 200 MG/1
200 TABLET, FILM COATED ORAL ONCE AS NEEDED
Status: COMPLETED | OUTPATIENT
Start: 2020-04-16 | End: 2020-04-16

## 2020-04-16 RX ORDER — ATORVASTATIN CALCIUM 40 MG/1
40 TABLET, FILM COATED ORAL DAILY
COMMUNITY
End: 2020-06-25

## 2020-04-16 RX ADMIN — CEFAZOLIN SODIUM 2000 MG: 2 SOLUTION INTRAVENOUS at 08:17

## 2020-04-16 RX ADMIN — MIDAZOLAM 1 MG: 1 INJECTION INTRAMUSCULAR; INTRAVENOUS at 08:23

## 2020-04-16 RX ADMIN — SODIUM CHLORIDE 125 ML/HR: 0.9 INJECTION, SOLUTION INTRAVENOUS at 07:30

## 2020-04-16 RX ADMIN — FENTANYL CITRATE 50 MCG: 50 INJECTION, SOLUTION INTRAMUSCULAR; INTRAVENOUS at 08:17

## 2020-04-16 RX ADMIN — SODIUM CHLORIDE: 0.9 INJECTION, SOLUTION INTRAVENOUS at 08:59

## 2020-04-16 RX ADMIN — PROPOFOL 200 MG: 10 INJECTION, EMULSION INTRAVENOUS at 08:20

## 2020-04-16 RX ADMIN — FENTANYL CITRATE 50 MCG: 50 INJECTION, SOLUTION INTRAMUSCULAR; INTRAVENOUS at 08:23

## 2020-04-16 RX ADMIN — LIDOCAINE HYDROCHLORIDE 60 MG: 20 INJECTION, SOLUTION INTRAVENOUS at 08:20

## 2020-04-16 RX ADMIN — MIDAZOLAM 1 MG: 1 INJECTION INTRAMUSCULAR; INTRAVENOUS at 08:17

## 2020-04-16 RX ADMIN — HYDRALAZINE HYDROCHLORIDE 10 MG: 20 INJECTION INTRAMUSCULAR; INTRAVENOUS at 10:01

## 2020-04-16 RX ADMIN — PHENAZOPYRIDINE 200 MG: 200 TABLET ORAL at 11:11

## 2020-04-20 ENCOUNTER — CLINICAL SUPPORT (OUTPATIENT)
Dept: UROLOGY | Facility: MEDICAL CENTER | Age: 80
End: 2020-04-20
Payer: MEDICARE

## 2020-04-20 VITALS
BODY MASS INDEX: 30.01 KG/M2 | HEIGHT: 68 IN | SYSTOLIC BLOOD PRESSURE: 140 MMHG | WEIGHT: 198 LBS | DIASTOLIC BLOOD PRESSURE: 80 MMHG | HEART RATE: 65 BPM

## 2020-04-20 DIAGNOSIS — C67.5 MALIGNANT NEOPLASM OF URINARY BLADDER NECK (HCC): ICD-10-CM

## 2020-04-20 DIAGNOSIS — C67.2 MALIGNANT NEOPLASM OF LATERAL WALL OF URINARY BLADDER (HCC): Primary | ICD-10-CM

## 2020-04-20 PROCEDURE — 3077F SYST BP >= 140 MM HG: CPT

## 2020-04-20 PROCEDURE — 4040F PNEUMOC VAC/ADMIN/RCVD: CPT

## 2020-04-20 PROCEDURE — 1160F RVW MEDS BY RX/DR IN RCRD: CPT

## 2020-04-20 PROCEDURE — 99211 OFF/OP EST MAY X REQ PHY/QHP: CPT

## 2020-04-20 PROCEDURE — 2026F EYE IMG VALID EVC RTNOPTHY: CPT

## 2020-04-20 PROCEDURE — 3044F HG A1C LEVEL LT 7.0%: CPT

## 2020-04-20 PROCEDURE — 3066F NEPHROPATHY DOC TX: CPT

## 2020-04-20 PROCEDURE — 3079F DIAST BP 80-89 MM HG: CPT

## 2020-04-20 PROCEDURE — 3008F BODY MASS INDEX DOCD: CPT

## 2020-04-29 ENCOUNTER — TELEMEDICINE (OUTPATIENT)
Dept: UROLOGY | Facility: MEDICAL CENTER | Age: 80
End: 2020-04-29
Payer: MEDICARE

## 2020-04-29 ENCOUNTER — TELEPHONE (OUTPATIENT)
Dept: UROLOGY | Facility: MEDICAL CENTER | Age: 80
End: 2020-04-29

## 2020-04-29 DIAGNOSIS — C67.2 MALIGNANT NEOPLASM OF LATERAL WALL OF URINARY BLADDER (HCC): Primary | ICD-10-CM

## 2020-04-29 PROCEDURE — 99442 PR PHYS/QHP TELEPHONE EVALUATION 11-20 MIN: CPT | Performed by: UROLOGY

## 2020-05-05 ENCOUNTER — DOCUMENTATION (OUTPATIENT)
Dept: UROLOGY | Facility: CLINIC | Age: 80
End: 2020-05-05

## 2020-05-06 ENCOUNTER — PATIENT OUTREACH (OUTPATIENT)
Dept: UROLOGY | Facility: CLINIC | Age: 80
End: 2020-05-06

## 2020-05-06 ENCOUNTER — APPOINTMENT (OUTPATIENT)
Dept: LAB | Facility: CLINIC | Age: 80
End: 2020-05-06
Payer: MEDICARE

## 2020-05-06 DIAGNOSIS — C67.2 MALIGNANT NEOPLASM OF LATERAL WALL OF URINARY BLADDER (HCC): Primary | ICD-10-CM

## 2020-05-06 DIAGNOSIS — Z12.11 COLON CANCER SCREENING: ICD-10-CM

## 2020-05-06 LAB — HEMOCCULT STL QL IA: NEGATIVE

## 2020-05-06 PROCEDURE — G0328 FECAL BLOOD SCRN IMMUNOASSAY: HCPCS

## 2020-05-07 ENCOUNTER — TELEPHONE (OUTPATIENT)
Dept: SURGICAL ONCOLOGY | Facility: CLINIC | Age: 80
End: 2020-05-07

## 2020-05-08 ENCOUNTER — TELEPHONE (OUTPATIENT)
Dept: UROLOGY | Facility: MEDICAL CENTER | Age: 80
End: 2020-05-08

## 2020-05-11 ENCOUNTER — CONSULT (OUTPATIENT)
Dept: HEMATOLOGY ONCOLOGY | Facility: CLINIC | Age: 80
End: 2020-05-11
Payer: MEDICARE

## 2020-05-11 ENCOUNTER — PATIENT OUTREACH (OUTPATIENT)
Dept: HEMATOLOGY ONCOLOGY | Facility: CLINIC | Age: 80
End: 2020-05-11

## 2020-05-11 VITALS
WEIGHT: 200.5 LBS | SYSTOLIC BLOOD PRESSURE: 142 MMHG | HEIGHT: 68 IN | RESPIRATION RATE: 18 BRPM | HEART RATE: 68 BPM | TEMPERATURE: 97.9 F | BODY MASS INDEX: 30.39 KG/M2 | OXYGEN SATURATION: 97 % | DIASTOLIC BLOOD PRESSURE: 72 MMHG

## 2020-05-11 DIAGNOSIS — C67.2 MALIGNANT NEOPLASM OF LATERAL WALL OF URINARY BLADDER (HCC): ICD-10-CM

## 2020-05-11 PROCEDURE — 99205 OFFICE O/P NEW HI 60 MIN: CPT | Performed by: INTERNAL MEDICINE

## 2020-05-11 PROCEDURE — 3044F HG A1C LEVEL LT 7.0%: CPT | Performed by: INTERNAL MEDICINE

## 2020-05-15 ENCOUNTER — PATIENT OUTREACH (OUTPATIENT)
Dept: UROLOGY | Facility: CLINIC | Age: 80
End: 2020-05-15

## 2020-05-22 ENCOUNTER — APPOINTMENT (OUTPATIENT)
Dept: LAB | Facility: CLINIC | Age: 80
End: 2020-05-22
Payer: MEDICARE

## 2020-05-22 DIAGNOSIS — Z00.00 MEDICARE ANNUAL WELLNESS VISIT, SUBSEQUENT: ICD-10-CM

## 2020-05-22 DIAGNOSIS — E78.2 MIXED HYPERLIPIDEMIA: ICD-10-CM

## 2020-05-22 DIAGNOSIS — C67.2 MALIGNANT NEOPLASM OF LATERAL WALL OF URINARY BLADDER (HCC): ICD-10-CM

## 2020-05-22 DIAGNOSIS — C67.2 MALIGNANT NEOPLASM OF LATERAL WALL OF URINARY BLADDER (HCC): Primary | ICD-10-CM

## 2020-05-22 DIAGNOSIS — E11.59 TYPE 2 DIABETES MELLITUS WITH OTHER CIRCULATORY COMPLICATION, WITHOUT LONG-TERM CURRENT USE OF INSULIN (HCC): ICD-10-CM

## 2020-05-22 DIAGNOSIS — I10 BENIGN ESSENTIAL HYPERTENSION: ICD-10-CM

## 2020-05-22 LAB
ALBUMIN SERPL BCP-MCNC: 3.5 G/DL (ref 3.5–5)
ALP SERPL-CCNC: 80 U/L (ref 46–116)
ALT SERPL W P-5'-P-CCNC: 29 U/L (ref 12–78)
ANION GAP SERPL CALCULATED.3IONS-SCNC: 4 MMOL/L (ref 4–13)
AST SERPL W P-5'-P-CCNC: 17 U/L (ref 5–45)
BACTERIA UR QL AUTO: ABNORMAL /HPF
BASOPHILS # BLD AUTO: 0.03 THOUSANDS/ΜL (ref 0–0.1)
BASOPHILS NFR BLD AUTO: 0 % (ref 0–1)
BILIRUB SERPL-MCNC: 0.78 MG/DL (ref 0.2–1)
BILIRUB UR QL STRIP: NEGATIVE
BUN SERPL-MCNC: 20 MG/DL (ref 5–25)
CALCIUM SERPL-MCNC: 8.5 MG/DL (ref 8.3–10.1)
CHLORIDE SERPL-SCNC: 108 MMOL/L (ref 100–108)
CHOLEST SERPL-MCNC: 121 MG/DL (ref 50–200)
CLARITY UR: ABNORMAL
CO2 SERPL-SCNC: 28 MMOL/L (ref 21–32)
COLOR UR: YELLOW
CREAT SERPL-MCNC: 1.25 MG/DL (ref 0.6–1.3)
EOSINOPHIL # BLD AUTO: 0.48 THOUSAND/ΜL (ref 0–0.61)
EOSINOPHIL NFR BLD AUTO: 5 % (ref 0–6)
ERYTHROCYTE [DISTWIDTH] IN BLOOD BY AUTOMATED COUNT: 14.2 % (ref 11.6–15.1)
EST. AVERAGE GLUCOSE BLD GHB EST-MCNC: 131 MG/DL
GFR SERPL CREATININE-BSD FRML MDRD: 54 ML/MIN/1.73SQ M
GLUCOSE P FAST SERPL-MCNC: 153 MG/DL (ref 65–99)
GLUCOSE UR STRIP-MCNC: NEGATIVE MG/DL
HBA1C MFR BLD: 6.2 %
HCT VFR BLD AUTO: 41.5 % (ref 36.5–49.3)
HDLC SERPL-MCNC: 48 MG/DL
HGB BLD-MCNC: 13.9 G/DL (ref 12–17)
HGB UR QL STRIP.AUTO: ABNORMAL
IMM GRANULOCYTES # BLD AUTO: 0.05 THOUSAND/UL (ref 0–0.2)
IMM GRANULOCYTES NFR BLD AUTO: 1 % (ref 0–2)
KETONES UR STRIP-MCNC: NEGATIVE MG/DL
LDLC SERPL CALC-MCNC: 59 MG/DL (ref 0–100)
LEUKOCYTE ESTERASE UR QL STRIP: ABNORMAL
LYMPHOCYTES # BLD AUTO: 1.84 THOUSANDS/ΜL (ref 0.6–4.47)
LYMPHOCYTES NFR BLD AUTO: 18 % (ref 14–44)
MCH RBC QN AUTO: 31.3 PG (ref 26.8–34.3)
MCHC RBC AUTO-ENTMCNC: 33.5 G/DL (ref 31.4–37.4)
MCV RBC AUTO: 94 FL (ref 82–98)
MONOCYTES # BLD AUTO: 0.96 THOUSAND/ΜL (ref 0.17–1.22)
MONOCYTES NFR BLD AUTO: 9 % (ref 4–12)
NEUTROPHILS # BLD AUTO: 7.03 THOUSANDS/ΜL (ref 1.85–7.62)
NEUTS SEG NFR BLD AUTO: 67 % (ref 43–75)
NITRITE UR QL STRIP: NEGATIVE
NON-SQ EPI CELLS URNS QL MICRO: ABNORMAL /HPF
NONHDLC SERPL-MCNC: 73 MG/DL
NRBC BLD AUTO-RTO: 0 /100 WBCS
PH UR STRIP.AUTO: 6.5 [PH]
PLATELET # BLD AUTO: 131 THOUSANDS/UL (ref 149–390)
PMV BLD AUTO: 12.7 FL (ref 8.9–12.7)
POTASSIUM SERPL-SCNC: 3.7 MMOL/L (ref 3.5–5.3)
PROT SERPL-MCNC: 7 G/DL (ref 6.4–8.2)
PROT UR STRIP-MCNC: ABNORMAL MG/DL
RBC # BLD AUTO: 4.44 MILLION/UL (ref 3.88–5.62)
RBC #/AREA URNS AUTO: ABNORMAL /HPF
SODIUM SERPL-SCNC: 140 MMOL/L (ref 136–145)
SP GR UR STRIP.AUTO: 1.01 (ref 1–1.03)
T3FREE SERPL-MCNC: 2.12 PG/ML (ref 2.3–4.2)
TRIGL SERPL-MCNC: 68 MG/DL
TSH SERPL DL<=0.05 MIU/L-ACNC: 2.01 UIU/ML (ref 0.36–3.74)
UROBILINOGEN UR QL STRIP.AUTO: 0.2 E.U./DL
WBC # BLD AUTO: 10.39 THOUSAND/UL (ref 4.31–10.16)
WBC #/AREA URNS AUTO: ABNORMAL /HPF

## 2020-05-22 PROCEDURE — 84481 FREE ASSAY (FT-3): CPT

## 2020-05-22 PROCEDURE — 85025 COMPLETE CBC W/AUTO DIFF WBC: CPT

## 2020-05-22 PROCEDURE — 80053 COMPREHEN METABOLIC PANEL: CPT

## 2020-05-22 PROCEDURE — 36415 COLL VENOUS BLD VENIPUNCTURE: CPT

## 2020-05-22 PROCEDURE — 83036 HEMOGLOBIN GLYCOSYLATED A1C: CPT

## 2020-05-22 PROCEDURE — 80061 LIPID PANEL: CPT

## 2020-05-22 PROCEDURE — 84443 ASSAY THYROID STIM HORMONE: CPT

## 2020-05-22 PROCEDURE — 81001 URINALYSIS AUTO W/SCOPE: CPT

## 2020-05-22 RX ORDER — SODIUM CHLORIDE 9 MG/ML
20 INJECTION, SOLUTION INTRAVENOUS ONCE
Status: CANCELLED | OUTPATIENT
Start: 2020-06-16

## 2020-05-22 RX ORDER — SODIUM CHLORIDE 9 MG/ML
20 INJECTION, SOLUTION INTRAVENOUS ONCE
Status: CANCELLED | OUTPATIENT
Start: 2020-05-26

## 2020-05-26 ENCOUNTER — DOCUMENTATION (OUTPATIENT)
Dept: INFUSION CENTER | Facility: HOSPITAL | Age: 80
End: 2020-05-26

## 2020-05-26 ENCOUNTER — HOSPITAL ENCOUNTER (OUTPATIENT)
Dept: INFUSION CENTER | Facility: HOSPITAL | Age: 80
Discharge: HOME | End: 2020-05-26
Attending: INTERNAL MEDICINE
Payer: MEDICARE

## 2020-05-26 VITALS
SYSTOLIC BLOOD PRESSURE: 138 MMHG | WEIGHT: 196 LBS | DIASTOLIC BLOOD PRESSURE: 74 MMHG | HEIGHT: 68 IN | HEART RATE: 58 BPM | TEMPERATURE: 97.8 F | BODY MASS INDEX: 29.7 KG/M2 | RESPIRATION RATE: 18 BRPM

## 2020-05-26 DIAGNOSIS — C67.2 MALIGNANT NEOPLASM OF LATERAL WALL OF URINARY BLADDER (HCC): Primary | ICD-10-CM

## 2020-05-26 PROCEDURE — 96413 CHEMO IV INFUSION 1 HR: CPT

## 2020-05-26 RX ORDER — SODIUM CHLORIDE 9 MG/ML
20 INJECTION, SOLUTION INTRAVENOUS ONCE
Status: COMPLETED | OUTPATIENT
Start: 2020-05-26 | End: 2020-05-26

## 2020-05-26 RX ADMIN — SODIUM CHLORIDE 200 MG: 9 INJECTION, SOLUTION INTRAVENOUS at 15:42

## 2020-05-26 RX ADMIN — SODIUM CHLORIDE 20 ML/HR: 0.9 INJECTION, SOLUTION INTRAVENOUS at 15:10

## 2020-06-15 ENCOUNTER — TELEPHONE (OUTPATIENT)
Dept: HEMATOLOGY ONCOLOGY | Facility: CLINIC | Age: 80
End: 2020-06-15

## 2020-06-16 ENCOUNTER — OFFICE VISIT (OUTPATIENT)
Dept: HEMATOLOGY ONCOLOGY | Facility: CLINIC | Age: 80
End: 2020-06-16
Payer: MEDICARE

## 2020-06-16 ENCOUNTER — TELEPHONE (OUTPATIENT)
Dept: HEMATOLOGY ONCOLOGY | Facility: CLINIC | Age: 80
End: 2020-06-16

## 2020-06-16 ENCOUNTER — HOSPITAL ENCOUNTER (OUTPATIENT)
Dept: INFUSION CENTER | Facility: HOSPITAL | Age: 80
Discharge: HOME | End: 2020-06-16
Attending: INTERNAL MEDICINE
Payer: MEDICARE

## 2020-06-16 VITALS
BODY MASS INDEX: 31.2 KG/M2 | OXYGEN SATURATION: 98 % | DIASTOLIC BLOOD PRESSURE: 90 MMHG | HEART RATE: 63 BPM | HEIGHT: 67 IN | SYSTOLIC BLOOD PRESSURE: 210 MMHG | WEIGHT: 198.8 LBS | TEMPERATURE: 98.2 F | RESPIRATION RATE: 18 BRPM

## 2020-06-16 VITALS
RESPIRATION RATE: 16 BRPM | SYSTOLIC BLOOD PRESSURE: 198 MMHG | HEART RATE: 62 BPM | DIASTOLIC BLOOD PRESSURE: 100 MMHG | TEMPERATURE: 98.1 F

## 2020-06-16 DIAGNOSIS — C67.2 MALIGNANT NEOPLASM OF LATERAL WALL OF URINARY BLADDER (HCC): Primary | ICD-10-CM

## 2020-06-16 PROCEDURE — 99214 OFFICE O/P EST MOD 30 MIN: CPT | Performed by: INTERNAL MEDICINE

## 2020-06-16 PROCEDURE — 2026F EYE IMG VALID EVC RTNOPTHY: CPT | Performed by: INTERNAL MEDICINE

## 2020-06-16 PROCEDURE — 3044F HG A1C LEVEL LT 7.0%: CPT | Performed by: INTERNAL MEDICINE

## 2020-06-16 PROCEDURE — 3077F SYST BP >= 140 MM HG: CPT | Performed by: INTERNAL MEDICINE

## 2020-06-16 PROCEDURE — 1036F TOBACCO NON-USER: CPT | Performed by: INTERNAL MEDICINE

## 2020-06-16 PROCEDURE — 96413 CHEMO IV INFUSION 1 HR: CPT

## 2020-06-16 PROCEDURE — 3066F NEPHROPATHY DOC TX: CPT | Performed by: INTERNAL MEDICINE

## 2020-06-16 PROCEDURE — 3008F BODY MASS INDEX DOCD: CPT | Performed by: INTERNAL MEDICINE

## 2020-06-16 PROCEDURE — 3080F DIAST BP >= 90 MM HG: CPT | Performed by: INTERNAL MEDICINE

## 2020-06-16 PROCEDURE — 1160F RVW MEDS BY RX/DR IN RCRD: CPT | Performed by: INTERNAL MEDICINE

## 2020-06-16 PROCEDURE — 4040F PNEUMOC VAC/ADMIN/RCVD: CPT | Performed by: INTERNAL MEDICINE

## 2020-06-16 RX ORDER — SODIUM CHLORIDE 9 MG/ML
20 INJECTION, SOLUTION INTRAVENOUS ONCE
Status: COMPLETED | OUTPATIENT
Start: 2020-06-16 | End: 2020-06-16

## 2020-06-16 RX ORDER — SIMVASTATIN 20 MG
20 TABLET ORAL
COMMUNITY
End: 2020-08-13 | Stop reason: ALTCHOICE

## 2020-06-16 RX ADMIN — SODIUM CHLORIDE 20 ML/HR: 0.9 INJECTION, SOLUTION INTRAVENOUS at 12:57

## 2020-06-16 RX ADMIN — SODIUM CHLORIDE 200 MG: 9 INJECTION, SOLUTION INTRAVENOUS at 12:59

## 2020-06-19 DIAGNOSIS — C67.2 MALIGNANT NEOPLASM OF LATERAL WALL OF URINARY BLADDER (HCC): Primary | ICD-10-CM

## 2020-06-24 ENCOUNTER — APPOINTMENT (OUTPATIENT)
Dept: LAB | Facility: CLINIC | Age: 80
End: 2020-06-24
Payer: MEDICARE

## 2020-06-24 ENCOUNTER — TRANSCRIBE ORDERS (OUTPATIENT)
Dept: LAB | Facility: CLINIC | Age: 80
End: 2020-06-24

## 2020-06-24 DIAGNOSIS — I15.2 OBESITY, DIABETES, AND HYPERTENSION SYNDROME (HCC): ICD-10-CM

## 2020-06-24 DIAGNOSIS — E11.69 OBESITY, DIABETES, AND HYPERTENSION SYNDROME (HCC): ICD-10-CM

## 2020-06-24 DIAGNOSIS — E11.59 OBESITY, DIABETES, AND HYPERTENSION SYNDROME (HCC): ICD-10-CM

## 2020-06-24 DIAGNOSIS — E66.9 OBESITY, DIABETES, AND HYPERTENSION SYNDROME (HCC): ICD-10-CM

## 2020-06-24 DIAGNOSIS — E11.69 OBESITY, DIABETES, AND HYPERTENSION SYNDROME (HCC): Primary | ICD-10-CM

## 2020-06-24 DIAGNOSIS — E11.59 OBESITY, DIABETES, AND HYPERTENSION SYNDROME (HCC): Primary | ICD-10-CM

## 2020-06-24 DIAGNOSIS — E66.9 OBESITY, DIABETES, AND HYPERTENSION SYNDROME (HCC): Primary | ICD-10-CM

## 2020-06-24 DIAGNOSIS — I15.2 OBESITY, DIABETES, AND HYPERTENSION SYNDROME (HCC): Primary | ICD-10-CM

## 2020-06-24 LAB
ALBUMIN SERPL BCP-MCNC: 3.4 G/DL (ref 3.5–5)
ALP SERPL-CCNC: 286 U/L (ref 46–116)
ALT SERPL W P-5'-P-CCNC: 174 U/L (ref 12–78)
ANION GAP SERPL CALCULATED.3IONS-SCNC: 5 MMOL/L (ref 4–13)
AST SERPL W P-5'-P-CCNC: 108 U/L (ref 5–45)
BACTERIA UR QL AUTO: ABNORMAL /HPF
BASOPHILS # BLD AUTO: 0.06 THOUSANDS/ΜL (ref 0–0.1)
BASOPHILS NFR BLD AUTO: 1 % (ref 0–1)
BILIRUB SERPL-MCNC: 0.91 MG/DL (ref 0.2–1)
BILIRUB UR QL STRIP: NEGATIVE
BUN SERPL-MCNC: 18 MG/DL (ref 5–25)
CALCIUM SERPL-MCNC: 8.7 MG/DL (ref 8.3–10.1)
CHLORIDE SERPL-SCNC: 106 MMOL/L (ref 100–108)
CHOLEST SERPL-MCNC: 122 MG/DL (ref 50–200)
CLARITY UR: CLEAR
CO2 SERPL-SCNC: 27 MMOL/L (ref 21–32)
COLOR UR: YELLOW
CREAT SERPL-MCNC: 1.28 MG/DL (ref 0.6–1.3)
CREAT UR-MCNC: 15.5 MG/DL
EOSINOPHIL # BLD AUTO: 0.34 THOUSAND/ΜL (ref 0–0.61)
EOSINOPHIL NFR BLD AUTO: 3 % (ref 0–6)
ERYTHROCYTE [DISTWIDTH] IN BLOOD BY AUTOMATED COUNT: 14.4 % (ref 11.6–15.1)
EST. AVERAGE GLUCOSE BLD GHB EST-MCNC: 140 MG/DL
GFR SERPL CREATININE-BSD FRML MDRD: 53 ML/MIN/1.73SQ M
GLUCOSE P FAST SERPL-MCNC: 144 MG/DL (ref 65–99)
GLUCOSE UR STRIP-MCNC: NEGATIVE MG/DL
HBA1C MFR BLD: 6.5 %
HCT VFR BLD AUTO: 39.1 % (ref 36.5–49.3)
HDLC SERPL-MCNC: 55 MG/DL
HGB BLD-MCNC: 12.9 G/DL (ref 12–17)
HGB UR QL STRIP.AUTO: NEGATIVE
HYALINE CASTS #/AREA URNS LPF: ABNORMAL /LPF
IMM GRANULOCYTES # BLD AUTO: 0.07 THOUSAND/UL (ref 0–0.2)
IMM GRANULOCYTES NFR BLD AUTO: 1 % (ref 0–2)
KETONES UR STRIP-MCNC: NEGATIVE MG/DL
LDLC SERPL CALC-MCNC: 57 MG/DL (ref 0–100)
LEUKOCYTE ESTERASE UR QL STRIP: ABNORMAL
LYMPHOCYTES # BLD AUTO: 1.35 THOUSANDS/ΜL (ref 0.6–4.47)
LYMPHOCYTES NFR BLD AUTO: 13 % (ref 14–44)
MCH RBC QN AUTO: 31.5 PG (ref 26.8–34.3)
MCHC RBC AUTO-ENTMCNC: 33 G/DL (ref 31.4–37.4)
MCV RBC AUTO: 96 FL (ref 82–98)
MICROALBUMIN UR-MCNC: 54 MG/L (ref 0–20)
MICROALBUMIN/CREAT 24H UR: 348 MG/G CREATININE (ref 0–30)
MONOCYTES # BLD AUTO: 1.07 THOUSAND/ΜL (ref 0.17–1.22)
MONOCYTES NFR BLD AUTO: 10 % (ref 4–12)
NEUTROPHILS # BLD AUTO: 7.56 THOUSANDS/ΜL (ref 1.85–7.62)
NEUTS SEG NFR BLD AUTO: 72 % (ref 43–75)
NITRITE UR QL STRIP: NEGATIVE
NON-SQ EPI CELLS URNS QL MICRO: ABNORMAL /HPF
NONHDLC SERPL-MCNC: 67 MG/DL
NRBC BLD AUTO-RTO: 0 /100 WBCS
PH UR STRIP.AUTO: 7 [PH]
PLATELET # BLD AUTO: 162 THOUSANDS/UL (ref 149–390)
PMV BLD AUTO: 12.6 FL (ref 8.9–12.7)
POTASSIUM SERPL-SCNC: 3.9 MMOL/L (ref 3.5–5.3)
PROT SERPL-MCNC: 7.2 G/DL (ref 6.4–8.2)
PROT UR STRIP-MCNC: NEGATIVE MG/DL
RBC # BLD AUTO: 4.09 MILLION/UL (ref 3.88–5.62)
RBC #/AREA URNS AUTO: ABNORMAL /HPF
SODIUM SERPL-SCNC: 138 MMOL/L (ref 136–145)
SP GR UR STRIP.AUTO: 1 (ref 1–1.03)
TRIGL SERPL-MCNC: 48 MG/DL
TSH SERPL DL<=0.05 MIU/L-ACNC: 2.13 UIU/ML (ref 0.36–3.74)
UROBILINOGEN UR QL STRIP.AUTO: 0.2 E.U./DL
WBC # BLD AUTO: 10.45 THOUSAND/UL (ref 4.31–10.16)
WBC #/AREA URNS AUTO: ABNORMAL /HPF

## 2020-06-24 PROCEDURE — 82570 ASSAY OF URINE CREATININE: CPT

## 2020-06-24 PROCEDURE — 80053 COMPREHEN METABOLIC PANEL: CPT

## 2020-06-24 PROCEDURE — 36415 COLL VENOUS BLD VENIPUNCTURE: CPT

## 2020-06-24 PROCEDURE — 85025 COMPLETE CBC W/AUTO DIFF WBC: CPT

## 2020-06-24 PROCEDURE — 82043 UR ALBUMIN QUANTITATIVE: CPT

## 2020-06-24 PROCEDURE — 84443 ASSAY THYROID STIM HORMONE: CPT

## 2020-06-24 PROCEDURE — 88112 CYTOPATH CELL ENHANCE TECH: CPT | Performed by: PATHOLOGY

## 2020-06-24 PROCEDURE — 83036 HEMOGLOBIN GLYCOSYLATED A1C: CPT

## 2020-06-24 PROCEDURE — 80061 LIPID PANEL: CPT

## 2020-06-24 PROCEDURE — 81001 URINALYSIS AUTO W/SCOPE: CPT

## 2020-06-25 ENCOUNTER — OFFICE VISIT (OUTPATIENT)
Dept: INTERNAL MEDICINE CLINIC | Facility: CLINIC | Age: 80
End: 2020-06-25
Payer: MEDICARE

## 2020-06-25 ENCOUNTER — TELEPHONE (OUTPATIENT)
Dept: OTHER | Facility: OTHER | Age: 80
End: 2020-06-25

## 2020-06-25 VITALS
SYSTOLIC BLOOD PRESSURE: 154 MMHG | OXYGEN SATURATION: 98 % | WEIGHT: 197 LBS | BODY MASS INDEX: 30.92 KG/M2 | HEART RATE: 57 BPM | HEIGHT: 67 IN | TEMPERATURE: 98.5 F | DIASTOLIC BLOOD PRESSURE: 84 MMHG

## 2020-06-25 DIAGNOSIS — N32.81 OVERACTIVE BLADDER: ICD-10-CM

## 2020-06-25 DIAGNOSIS — E78.2 MIXED HYPERLIPIDEMIA: ICD-10-CM

## 2020-06-25 DIAGNOSIS — E11.59 TYPE 2 DIABETES MELLITUS WITH OTHER CIRCULATORY COMPLICATION, WITHOUT LONG-TERM CURRENT USE OF INSULIN (HCC): Primary | ICD-10-CM

## 2020-06-25 DIAGNOSIS — Z85.51 HISTORY OF BLADDER CANCER: ICD-10-CM

## 2020-06-25 DIAGNOSIS — K20.90 BARRETT'S ESOPHAGUS WITH ESOPHAGITIS: ICD-10-CM

## 2020-06-25 DIAGNOSIS — K22.70 BARRETT'S ESOPHAGUS WITH ESOPHAGITIS: ICD-10-CM

## 2020-06-25 DIAGNOSIS — Z00.00 MEDICARE ANNUAL WELLNESS VISIT, SUBSEQUENT: ICD-10-CM

## 2020-06-25 DIAGNOSIS — R94.5 LIVER FUNCTION STUDY, ABNORMAL: ICD-10-CM

## 2020-06-25 DIAGNOSIS — I25.5 ISCHEMIC CARDIOMYOPATHY: ICD-10-CM

## 2020-06-25 DIAGNOSIS — I10 BENIGN ESSENTIAL HYPERTENSION: ICD-10-CM

## 2020-06-25 PROCEDURE — 4040F PNEUMOC VAC/ADMIN/RCVD: CPT | Performed by: INTERNAL MEDICINE

## 2020-06-25 PROCEDURE — 1125F AMNT PAIN NOTED PAIN PRSNT: CPT | Performed by: INTERNAL MEDICINE

## 2020-06-25 PROCEDURE — 3079F DIAST BP 80-89 MM HG: CPT | Performed by: INTERNAL MEDICINE

## 2020-06-25 PROCEDURE — 3044F HG A1C LEVEL LT 7.0%: CPT | Performed by: INTERNAL MEDICINE

## 2020-06-25 PROCEDURE — 1160F RVW MEDS BY RX/DR IN RCRD: CPT | Performed by: INTERNAL MEDICINE

## 2020-06-25 PROCEDURE — 99214 OFFICE O/P EST MOD 30 MIN: CPT | Performed by: INTERNAL MEDICINE

## 2020-06-25 PROCEDURE — G0439 PPPS, SUBSEQ VISIT: HCPCS | Performed by: INTERNAL MEDICINE

## 2020-06-25 PROCEDURE — 2026F EYE IMG VALID EVC RTNOPTHY: CPT | Performed by: INTERNAL MEDICINE

## 2020-06-25 PROCEDURE — 3060F POS MICROALBUMINURIA REV: CPT | Performed by: INTERNAL MEDICINE

## 2020-06-25 PROCEDURE — 1170F FXNL STATUS ASSESSED: CPT | Performed by: INTERNAL MEDICINE

## 2020-06-25 PROCEDURE — 3066F NEPHROPATHY DOC TX: CPT | Performed by: INTERNAL MEDICINE

## 2020-06-25 PROCEDURE — 3008F BODY MASS INDEX DOCD: CPT | Performed by: INTERNAL MEDICINE

## 2020-06-25 PROCEDURE — 1036F TOBACCO NON-USER: CPT | Performed by: INTERNAL MEDICINE

## 2020-06-25 PROCEDURE — 3077F SYST BP >= 140 MM HG: CPT | Performed by: INTERNAL MEDICINE

## 2020-06-26 ENCOUNTER — TELEPHONE (OUTPATIENT)
Dept: HEMATOLOGY ONCOLOGY | Facility: CLINIC | Age: 80
End: 2020-06-26

## 2020-06-27 DIAGNOSIS — I10 ESSENTIAL HYPERTENSION, BENIGN: ICD-10-CM

## 2020-06-28 RX ORDER — METOPROLOL TARTRATE 50 MG/1
TABLET, FILM COATED ORAL
Qty: 180 TABLET | Refills: 4 | Status: SHIPPED | OUTPATIENT
Start: 2020-06-28 | End: 2021-08-23 | Stop reason: SDUPTHER

## 2020-06-29 ENCOUNTER — TELEPHONE (OUTPATIENT)
Dept: HEMATOLOGY ONCOLOGY | Facility: CLINIC | Age: 80
End: 2020-06-29

## 2020-06-29 RX ORDER — SOLIFENACIN SUCCINATE 10 MG/1
TABLET, FILM COATED ORAL
Qty: 90 TABLET | Refills: 2 | Status: SHIPPED | OUTPATIENT
Start: 2020-06-29 | End: 2020-11-20

## 2020-06-30 ENCOUNTER — TELEPHONE (OUTPATIENT)
Dept: UROLOGY | Facility: MEDICAL CENTER | Age: 80
End: 2020-06-30

## 2020-07-01 ENCOUNTER — HOSPITAL ENCOUNTER (OUTPATIENT)
Dept: RADIOLOGY | Facility: HOSPITAL | Age: 80
Discharge: HOME | End: 2020-07-01
Payer: MEDICARE

## 2020-07-01 DIAGNOSIS — R94.5 LIVER FUNCTION STUDY, ABNORMAL: ICD-10-CM

## 2020-07-01 PROCEDURE — 76700 US EXAM ABDOM COMPLETE: CPT

## 2020-07-02 ENCOUNTER — APPOINTMENT (OUTPATIENT)
Dept: LAB | Facility: CLINIC | Age: 80
End: 2020-07-02
Payer: MEDICARE

## 2020-07-02 DIAGNOSIS — R94.5 LIVER FUNCTION STUDY, ABNORMAL: ICD-10-CM

## 2020-07-02 DIAGNOSIS — C67.2 MALIGNANT NEOPLASM OF LATERAL WALL OF URINARY BLADDER (HCC): ICD-10-CM

## 2020-07-02 LAB
ALBUMIN SERPL BCP-MCNC: 3.5 G/DL (ref 3.5–5)
ALP SERPL-CCNC: 596 U/L (ref 46–116)
ALT SERPL W P-5'-P-CCNC: 248 U/L (ref 12–78)
AST SERPL W P-5'-P-CCNC: 192 U/L (ref 5–45)
BILIRUB DIRECT SERPL-MCNC: 0.62 MG/DL (ref 0–0.2)
BILIRUB SERPL-MCNC: 1.42 MG/DL (ref 0.2–1)
HAV IGM SER QL: NORMAL
HBV CORE IGM SER QL: NORMAL
HBV SURFACE AG SER QL: NORMAL
HCV AB SER QL: NORMAL
PROT SERPL-MCNC: 7.6 G/DL (ref 6.4–8.2)

## 2020-07-02 PROCEDURE — 80076 HEPATIC FUNCTION PANEL: CPT

## 2020-07-02 PROCEDURE — 36415 COLL VENOUS BLD VENIPUNCTURE: CPT

## 2020-07-02 PROCEDURE — 80074 ACUTE HEPATITIS PANEL: CPT

## 2020-07-07 ENCOUNTER — OFFICE VISIT (OUTPATIENT)
Dept: INTERNAL MEDICINE CLINIC | Facility: CLINIC | Age: 80
End: 2020-07-07
Payer: MEDICARE

## 2020-07-07 VITALS
TEMPERATURE: 97.8 F | WEIGHT: 194 LBS | HEIGHT: 67 IN | HEART RATE: 68 BPM | SYSTOLIC BLOOD PRESSURE: 136 MMHG | DIASTOLIC BLOOD PRESSURE: 90 MMHG | BODY MASS INDEX: 30.45 KG/M2 | OXYGEN SATURATION: 98 % | RESPIRATION RATE: 16 BRPM

## 2020-07-07 DIAGNOSIS — R94.5 LIVER FUNCTION STUDY, ABNORMAL: Primary | ICD-10-CM

## 2020-07-07 DIAGNOSIS — E78.2 MIXED HYPERLIPIDEMIA: ICD-10-CM

## 2020-07-07 PROCEDURE — 3060F POS MICROALBUMINURIA REV: CPT | Performed by: INTERNAL MEDICINE

## 2020-07-07 PROCEDURE — 3044F HG A1C LEVEL LT 7.0%: CPT | Performed by: INTERNAL MEDICINE

## 2020-07-07 PROCEDURE — 4040F PNEUMOC VAC/ADMIN/RCVD: CPT | Performed by: INTERNAL MEDICINE

## 2020-07-07 PROCEDURE — 3075F SYST BP GE 130 - 139MM HG: CPT | Performed by: INTERNAL MEDICINE

## 2020-07-07 PROCEDURE — 1170F FXNL STATUS ASSESSED: CPT | Performed by: INTERNAL MEDICINE

## 2020-07-07 PROCEDURE — 3080F DIAST BP >= 90 MM HG: CPT | Performed by: INTERNAL MEDICINE

## 2020-07-07 PROCEDURE — 1160F RVW MEDS BY RX/DR IN RCRD: CPT | Performed by: INTERNAL MEDICINE

## 2020-07-07 PROCEDURE — 3066F NEPHROPATHY DOC TX: CPT | Performed by: INTERNAL MEDICINE

## 2020-07-07 PROCEDURE — 99214 OFFICE O/P EST MOD 30 MIN: CPT | Performed by: INTERNAL MEDICINE

## 2020-07-07 PROCEDURE — 2026F EYE IMG VALID EVC RTNOPTHY: CPT | Performed by: INTERNAL MEDICINE

## 2020-07-07 PROCEDURE — 3008F BODY MASS INDEX DOCD: CPT | Performed by: INTERNAL MEDICINE

## 2020-07-07 PROCEDURE — 1036F TOBACCO NON-USER: CPT | Performed by: INTERNAL MEDICINE

## 2020-07-07 NOTE — ASSESSMENT & PLAN NOTE
Patient with last visit did have routine labs performed period notable abnormalities with his liver function test   Patient did go for an ultrasound of the abdomen showing no significant abnormality seen with the liver, gallbladder, pancreas  Patient also went for acute hepatitis profile which was negative, repeat liver enzymes which continue to increase  In looking at the possible etiology we did have concerns over the patient receiving chemotherapy for his bladder tumor and this may be the causative factor  We have placed a consult evaluation with a GI specialist   We also will be calling his oncologist to his conducting the chemotherapy in order to have them evaluate his abnormalities and consider stopping the particular chemotherapy agent which could be causing liver abnormalities  Patient was told to sustain from alcohol until seen  Repeat liver function tests in a one-week period we will discuss the results with the GI specialist if an appointment cannot be made in the acute future  This is all been discussed with the patient and his wife who was in attendance during the visit today  No new abnormalities were found on exam today    Patient has no jaundice

## 2020-07-07 NOTE — ASSESSMENT & PLAN NOTE
Does have a history of hyperlipidemia, coronary artery disease  We do not feel that the simvastatin is the causative agent for her his liver abnormalities    Would consider stopping the medication if continued increase in liver function testing

## 2020-07-07 NOTE — ASSESSMENT & PLAN NOTE
Again patient is undergoing chemotherapy for his bladder neoplasm    Again we have concerns that the chemotherapeutic agent that is being used may be causing liver function abnormalities and will discuss this with his oncologist

## 2020-07-07 NOTE — PROGRESS NOTES
Assessment/Plan:    Liver function study, abnormal  Patient with last visit did have routine labs performed period notable abnormalities with his liver function test   Patient did go for an ultrasound of the abdomen showing no significant abnormality seen with the liver, gallbladder, pancreas  Patient also went for acute hepatitis profile which was negative, repeat liver enzymes which continue to increase  In looking at the possible etiology we did have concerns over the patient receiving chemotherapy for his bladder tumor and this may be the causative factor  We have placed a consult evaluation with a GI specialist   We also will be calling his oncologist to his conducting the chemotherapy in order to have them evaluate his abnormalities and consider stopping the particular chemotherapy agent which could be causing liver abnormalities  Patient was told to sustain from alcohol until seen  Repeat liver function tests in a one-week period we will discuss the results with the GI specialist if an appointment cannot be made in the acute future  This is all been discussed with the patient and his wife who was in attendance during the visit today  No new abnormalities were found on exam today  Patient has no jaundice    Hyperlipidemia  Does have a history of hyperlipidemia, coronary artery disease  We do not feel that the simvastatin is the causative agent for her his liver abnormalities  Would consider stopping the medication if continued increase in liver function testing    Malignant neoplasm of lateral wall of urinary bladder ([facility] [address] )  Again patient is undergoing chemotherapy for his bladder neoplasm  Again we have concerns that the chemotherapeutic agent that is being used may be causing liver function abnormalities and will discuss this with his oncologist       Diagnoses and all orders for this visit:    Liver function study, abnormal  -     Ambulatory referral to Gastroenterology;  Future  -     Hepatic function panel; Future    Mixed hyperlipidemia          Subjective:      Patient ID: Sudarshan Goodwin is a 78 y o  male  Patient 49-year-old male history of medical problems as outlined previously  Seen recently had routine lab testing performed liver function abnormalities noted  Has undergone ultrasound of abdomen, acute hepatitis profile to possibly elicit etiology  Patient had repeat liver function testing performed which shows progression of his acute liver disease may be secondary to chemotherapy for his bladder cancer  Patient is accompanied today to the visit by his wife      The following portions of the patient's history were reviewed and updated as appropriate:   He  has a past medical history of Acute kidney injury ([address] ), Arthritis, Wright esophagus, BPH with obstruction/lower urinary tract symptoms, CAD (coronary artery disease), Cancer ([address] ), Cataract, acquired, Diabetes mellitus ([address] ), Diabetic neuropathy ([address] ), Enlarged prostate with lower urinary tract symptoms (LUTS), Erectile dysfunction, GERD (gastroesophageal reflux disease), Hemoptysis, Hypercholesterolemia, Hypertension, Macular degeneration, Myocardial infarction ([address] ) (1998), OAB (overactive bladder), Testicular hypofunction, Testicular hypogonadism, Tinnitus, Umbilical hernia, Urge incontinence of urine, and Wears glasses    He   Patient Active Problem List    Diagnosis Date Noted    Liver function study, abnormal 06/25/2020    Malignant neoplasm of urinary bladder neck ([address] ) 03/24/2020    Positive urinary cytology 11/05/2019    Coronary artery disease involving native coronary artery of native heart without angina pectoris 09/09/2019    Ischemic cardiomyopathy 09/09/2019    History of bladder cancer 07/30/2019    Medicare annual wellness visit, subsequent 05/23/2019    Closed fracture of upper end of right fibula 03/11/2019    Obesity (BMI 30 0-34 9) 01/22/2019    Malignant neoplasm of lateral wall of urinary bladder ([place] [address] ) 01/10/2019    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 01/10/2019    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/2019    Type 2 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 ([place] [address] ) 12/05/2018    Bladder tumor 11/01/2018    Overactive bladder 10/10/2018    Acute kidney injury ([place] [address] ) 05/30/2018    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    Backache 10/21/2013    Benign essential hypertension 10/11/2012    DMII (diabetes mellitus, type 2) ([place] [address] ) 10/11/2012    Hyperlipidemia 10/11/2012     He  has a past surgical history that includes pr colonoscopy flx dx w/collj spec when pfrmd (N/A, 4/25/2016); Cystoscopy (2013); Coronary artery bypass graft (07/16/2014); Colonoscopy; Inguinal hernia repair (Bilateral, 2015); Umbilical hernia repair (2012); Esophagogastroduodenoscopy; Tonsillectomy; Photodynamic Therapy; Transurethral resection of prostate (N/A, 12/20/2018); FL retrograde pyelogram (12/20/2018); pr cystourethroscopy,fulgur <0 5 cm lesn (N/A, 12/5/2019); FL retrograde pyelogram (12/5/2019); and pr cystourethroscopy,fulgur 2-5 cm lesn (N/A, 4/16/2020)  His family history includes Cancer in his mother; Coronary artery disease in his father; Diabetes in his father; Heart disease in his father; Hyperlipidemia in his father; Hypertension in his father; Other in his mother  He  reports that he quit smoking about 48 years ago  His smoking use included cigarettes  He has a 13 00 pack-year smoking history  He has never used smokeless tobacco  He reports that he drinks about 2 0 standard drinks of alcohol per week  He reports that he does not use drugs    Current Outpatient Medications   Medication Sig Dispense Refill    Bilberry 60 MG CAPS Take 1 capsule by mouth daily      Cholecalciferol (VITAMIN D) 50 MCG (2000 UT) tablet Take 2,000 Units by mouth daily      irbesartan (AVAPRO) 300 mg tablet TAKE ONE TABLET BY MOUTH EVERY DAY 90 tablet 3    metFORMIN (GLUCOPHAGE-XR) 500 mg 24 hr tablet TAKE TWO TABLETS BY MOUTH TWICE A DAY WITH MEALS (Patient taking differently: Take 500 mg by mouth 2 (two) times a day with meals ) 360 tablet 0    metoprolol tartrate (LOPRESSOR) 50 mg tablet TAKE ONE TABLET BY MOUTH TWICE A  tablet 4    Multiple Vitamins-Minerals (CENTRUM SILVER 50+MEN PO) Take 1 tablet by mouth daily        Multiple Vitamins-Minerals (OCUVITE-LUTEIN PO) Take 1 tablet by mouth 2 (two) times a day        NON FORMULARY Take by mouth Saffron extract 88  5 mg 1 tab daily      Omega-3 Fatty Acids (FISH OIL) 1200 MG CAPS Take 2 capsules by mouth daily       omeprazole (PriLOSEC) 40 MG capsule Take 40 mg by mouth daily at bedtime        simvastatin (ZOCOR) 20 mg tablet Take 20 mg by mouth daily at bedtime      solifenacin (VESICARE) 10 MG tablet TAKE ONE TABLET BY MOUTH EVERY DAY 90 tablet 2    phenazopyridine (PYRIDIUM) 200 mg tablet Take 1 tablet (200 mg total) by mouth 3 (three) times a day as needed for bladder spasms (Dysuria) (Patient not taking: Reported on 7/7/2020) 10 tablet 0     No current facility-administered medications for this visit  Current Outpatient Medications on File Prior to Visit   Medication Sig    Bilberry 60 MG CAPS Take 1 capsule by mouth daily    Cholecalciferol (VITAMIN D) 50 MCG (2000 UT) tablet Take 2,000 Units by mouth daily    irbesartan (AVAPRO) 300 mg tablet TAKE ONE TABLET BY MOUTH EVERY DAY    metFORMIN (GLUCOPHAGE-XR) 500 mg 24 hr tablet TAKE TWO TABLETS BY MOUTH TWICE A DAY WITH MEALS (Patient taking differently: Take 500 mg by mouth 2 (two) times a day with meals )    metoprolol tartrate (LOPRESSOR) 50 mg tablet TAKE ONE TABLET BY MOUTH TWICE A DAY    Multiple Vitamins-Minerals (CENTRUM SILVER 50+MEN PO) Take 1 tablet by mouth daily      Multiple Vitamins-Minerals (OCUVITE-LUTEIN PO) Take 1 tablet by mouth 2 (two) times a day      NON FORMULARY Take by mouth Saffron extract 88  5 mg 1 tab daily    Omega-3 Fatty Acids (FISH OIL) 1200 MG CAPS Take 2 capsules by mouth daily     omeprazole (PriLOSEC) 40 MG capsule Take 40 mg by mouth daily at bedtime      simvastatin (ZOCOR) 20 mg tablet Take 20 mg by mouth daily at bedtime    solifenacin (VESICARE) 10 MG tablet TAKE ONE TABLET BY MOUTH EVERY DAY    phenazopyridine (PYRIDIUM) 200 mg tablet Take 1 tablet (200 mg total) by mouth 3 (three) times a day as needed for bladder spasms (Dysuria) (Patient not taking: Reported on 7/7/2020)     No current facility-administered medications on file prior to visit  He is allergic to morphine and related       Review of Systems   Constitutional: Positive for fatigue (Patient is complaining of some increased fatigability)  Negative for activity change, appetite change, chills, diaphoresis, fever and unexpected weight change  HENT: Negative  Eyes: Negative  Respiratory: Negative  Cardiovascular: Negative  Gastrointestinal: Negative  Endocrine: Negative  Genitourinary: Negative  Musculoskeletal: Negative  Skin: Negative for color change, pallor and rash  Significant hair loss   Allergic/Immunologic: Negative  Neurological: Positive for weakness ( feeling of generalized weakness)  Negative for dizziness, tremors, seizures, syncope, facial asymmetry, speech difficulty, light-headedness, numbness and headaches  Hematological: Negative  Psychiatric/Behavioral: Negative  Objective:      /90 (BP Location: Left arm, Patient Position: Sitting)   Pulse 68   Temp 97 8 °F (36 6 °C)   Resp 16   Ht 5' 7" (1 702 m)   Wt 88 kg (194 lb)   SpO2 98%   BMI 30 38 kg/m²          Physical Exam   Constitutional: He is oriented to person, place, and time  He appears well-developed and well-nourished  No distress  Pleasant 20-year-old male who is awake alert no acute distress and oriented x3   HENT:   Head: Normocephalic and atraumatic     Right Ear: External ear normal    Left Ear: External ear normal  Nose: Nose normal    Mouth/Throat: Oropharynx is clear and moist  No oropharyngeal exudate  Eyes: Pupils are equal, round, and reactive to light  Conjunctivae and EOM are normal  Right eye exhibits no discharge  Left eye exhibits no discharge  No scleral icterus  Neck: Normal range of motion  Neck supple  No JVD present  No tracheal deviation present  No thyromegaly present  Cardiovascular: Normal rate, regular rhythm and normal heart sounds  Exam reveals no gallop and no friction rub  No murmur heard  Pulmonary/Chest: Effort normal and breath sounds normal  No stridor  No respiratory distress  He has no wheezes  He has no rales  He exhibits no tenderness  Abdominal: Soft  Bowel sounds are normal  He exhibits no distension and no mass  There is no tenderness  There is no rebound and no guarding  No hernia  Obese   Musculoskeletal: Normal range of motion  He exhibits no edema, tenderness or deformity  Lymphadenopathy:     He has no cervical adenopathy  Neurological: He is alert and oriented to person, place, and time  He displays normal reflexes  No cranial nerve deficit or sensory deficit  He exhibits normal muscle tone  Coordination normal    Skin: Skin is warm and dry  No rash noted  He is not diaphoretic  No erythema  No pallor  Psychiatric: He has a normal mood and affect  His behavior is normal  Thought content normal    Nursing note and vitals reviewed

## 2020-07-08 ENCOUNTER — TELEPHONE (OUTPATIENT)
Dept: HEMATOLOGY ONCOLOGY | Facility: CLINIC | Age: 80
End: 2020-07-08

## 2020-07-08 NOTE — TELEPHONE ENCOUNTER
Returned call to patient  Dr Tae Santana is aware of situation with patients elevated liver enzymes  Patient will see Dr Tae Santana on 7/28/20 to discuss this further  Patient will proceed with ordered testing by PCP  Infusion appointment canceled for 7/28/20

## 2020-07-08 NOTE — TELEPHONE ENCOUNTER
Patient is calling in to inform that his PCP Dr Veronica Collins has advised him to discontinue his Hernandez

## 2020-07-08 NOTE — TELEPHONE ENCOUNTER
Patient saw Dr Georgie Isabel yesterday  As per visit note:    Malignant neoplasm of lateral wall of urinary bladder ([facility] [address] )  Again patient is undergoing chemotherapy for his bladder neoplasm  Again we have concerns that the chemotherapeutic agent that is being used may be causing liver function abnormalities and will discuss this with his oncologist    Will pass on recommendation to Dr Jasmyn Granados RN    Patient would like to cancel next immunotherapy appt

## 2020-07-09 ENCOUNTER — TELEPHONE (OUTPATIENT)
Dept: UROLOGY | Facility: MEDICAL CENTER | Age: 80
End: 2020-07-09

## 2020-07-09 DIAGNOSIS — T50.A95S: Primary | ICD-10-CM

## 2020-07-09 NOTE — TELEPHONE ENCOUNTER
Patient of Dr Gilda Hernandez seen at [facility]    Patient had labs done and would like to discuss results      Patient can be reached at [phone]

## 2020-07-09 NOTE — TELEPHONE ENCOUNTER
Pt of Dr Michelle Chirinos managed at the [facility]  History of bladder cancer  Last seen in the office 4/29/2020  Pt called into the office stating that he recently had an appointment with his PCP and they sent him for some lab testing  According to patient, his liver enzymes were elevated and PCP was concerned that could be from the BCG treatments patient recently underwent  Pt would like Dr Michelle Chirinos to know of these recommendations of PCP at this time to keep him informed  Informed patient that I will contact Dr Michelle Chirinos in regards to his concerns

## 2020-07-13 ENCOUNTER — APPOINTMENT (OUTPATIENT)
Dept: LAB | Facility: CLINIC | Age: 80
End: 2020-07-13
Payer: MEDICARE

## 2020-07-13 DIAGNOSIS — R94.5 LIVER FUNCTION STUDY, ABNORMAL: ICD-10-CM

## 2020-07-13 LAB
ALBUMIN SERPL BCP-MCNC: 3.2 G/DL (ref 3.5–5)
ALP SERPL-CCNC: 811 U/L (ref 46–116)
ALT SERPL W P-5'-P-CCNC: 310 U/L (ref 12–78)
AST SERPL W P-5'-P-CCNC: 268 U/L (ref 5–45)
BILIRUB DIRECT SERPL-MCNC: 0.5 MG/DL (ref 0–0.2)
BILIRUB SERPL-MCNC: 0.85 MG/DL (ref 0.2–1)
PROT SERPL-MCNC: 7.6 G/DL (ref 6.4–8.2)

## 2020-07-13 PROCEDURE — 80076 HEPATIC FUNCTION PANEL: CPT

## 2020-07-13 PROCEDURE — 36415 COLL VENOUS BLD VENIPUNCTURE: CPT

## 2020-07-16 DIAGNOSIS — T50.A95S: Primary | ICD-10-CM

## 2020-07-16 NOTE — TELEPHONE ENCOUNTER
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  This is a very rare complication of BCG treatment  I have only seen systemic BCG infections a few times in a involve the prostate and testes  I have never seen it involves the liver  I see that the patient's PCP as ordered a GI consult  Because of the possibility of systemic BCG infection, I have put in a consultation to Infectious Disease  I discussed the case with Dr Janelle Oro  He recommended that we check a CBC, another round of liver functions and a liver ultrasound  I have ordered these studies  I called the patient on his home phone and left of the extensive message on his machine that I am concerned about what ever is going on in his liver that it may be due to his BCG treatments  I also indicated that I was ordering a liver ultrasound and blood work  I left my personal cell phone number for him to call me back  Obviously, the patient should not receive anymore BCG

## 2020-07-16 NOTE — TELEPHONE ENCOUNTER
Please tell the patient that his abnormal liver enzymes could be due to BCG  Sometimes, this is a sign that the BCG has spread beyond the bladder into other organs including the liver  Ask him whether he has any fever, loss of energy, malaise or loss of appetite  I would like him to be evaluated by Infectious Disease as soon as possible  I have placed the consultation and contacted their office  Thank you

## 2020-07-17 ENCOUNTER — TELEPHONE (OUTPATIENT)
Dept: HEMATOLOGY ONCOLOGY | Facility: CLINIC | Age: 80
End: 2020-07-17

## 2020-07-17 NOTE — TELEPHONE ENCOUNTER
Patient asked if going to be infused on the 28th, advised it is a follow up with dr Jana Iqbal  Patient voiced understanding

## 2020-07-20 ENCOUNTER — APPOINTMENT (OUTPATIENT)
Dept: LAB | Facility: CLINIC | Age: 80
End: 2020-07-20
Payer: MEDICARE

## 2020-07-20 ENCOUNTER — TELEPHONE (OUTPATIENT)
Dept: INFECTIOUS DISEASES | Facility: CLINIC | Age: 80
End: 2020-07-20

## 2020-07-20 ENCOUNTER — HOSPITAL ENCOUNTER (OUTPATIENT)
Dept: RADIOLOGY | Facility: HOSPITAL | Age: 80
Discharge: HOME | End: 2020-07-20
Attending: UROLOGY
Payer: MEDICARE

## 2020-07-20 DIAGNOSIS — T50.A95S: ICD-10-CM

## 2020-07-20 DIAGNOSIS — C67.2 MALIGNANT NEOPLASM OF LATERAL WALL OF URINARY BLADDER (HCC): ICD-10-CM

## 2020-07-20 LAB
ALBUMIN SERPL BCP-MCNC: 3.2 G/DL (ref 3.5–5)
ALP SERPL-CCNC: 742 U/L (ref 46–116)
ALT SERPL W P-5'-P-CCNC: 371 U/L (ref 12–78)
ANION GAP SERPL CALCULATED.3IONS-SCNC: 7 MMOL/L (ref 4–13)
AST SERPL W P-5'-P-CCNC: 356 U/L (ref 5–45)
BASOPHILS # BLD AUTO: 0.05 THOUSANDS/ΜL (ref 0–0.1)
BASOPHILS NFR BLD AUTO: 1 % (ref 0–1)
BILIRUB SERPL-MCNC: 0.92 MG/DL (ref 0.2–1)
BUN SERPL-MCNC: 21 MG/DL (ref 5–25)
CALCIUM SERPL-MCNC: 9.7 MG/DL (ref 8.3–10.1)
CHLORIDE SERPL-SCNC: 106 MMOL/L (ref 100–108)
CO2 SERPL-SCNC: 25 MMOL/L (ref 21–32)
CREAT SERPL-MCNC: 1.35 MG/DL (ref 0.6–1.3)
EOSINOPHIL # BLD AUTO: 0.36 THOUSAND/ΜL (ref 0–0.61)
EOSINOPHIL NFR BLD AUTO: 4 % (ref 0–6)
ERYTHROCYTE [DISTWIDTH] IN BLOOD BY AUTOMATED COUNT: 14 % (ref 11.6–15.1)
GFR SERPL CREATININE-BSD FRML MDRD: 50 ML/MIN/1.73SQ M
GLUCOSE P FAST SERPL-MCNC: 155 MG/DL (ref 65–99)
HCT VFR BLD AUTO: 41.9 % (ref 36.5–49.3)
HGB BLD-MCNC: 14 G/DL (ref 12–17)
IMM GRANULOCYTES # BLD AUTO: 0.07 THOUSAND/UL (ref 0–0.2)
IMM GRANULOCYTES NFR BLD AUTO: 1 % (ref 0–2)
LYMPHOCYTES # BLD AUTO: 1.04 THOUSANDS/ΜL (ref 0.6–4.47)
LYMPHOCYTES NFR BLD AUTO: 10 % (ref 14–44)
MCH RBC QN AUTO: 31.5 PG (ref 26.8–34.3)
MCHC RBC AUTO-ENTMCNC: 33.4 G/DL (ref 31.4–37.4)
MCV RBC AUTO: 94 FL (ref 82–98)
MONOCYTES # BLD AUTO: 1.1 THOUSAND/ΜL (ref 0.17–1.22)
MONOCYTES NFR BLD AUTO: 11 % (ref 4–12)
NEUTROPHILS # BLD AUTO: 7.73 THOUSANDS/ΜL (ref 1.85–7.62)
NEUTS SEG NFR BLD AUTO: 73 % (ref 43–75)
NRBC BLD AUTO-RTO: 0 /100 WBCS
PLATELET # BLD AUTO: 202 THOUSANDS/UL (ref 149–390)
PMV BLD AUTO: 13.3 FL (ref 8.9–12.7)
POTASSIUM SERPL-SCNC: 4 MMOL/L (ref 3.5–5.3)
PROT SERPL-MCNC: 7.5 G/DL (ref 6.4–8.2)
RBC # BLD AUTO: 4.45 MILLION/UL (ref 3.88–5.62)
SODIUM SERPL-SCNC: 138 MMOL/L (ref 136–145)
T3FREE SERPL-MCNC: 2.52 PG/ML (ref 2.3–4.2)
TSH SERPL DL<=0.05 MIU/L-ACNC: 1.63 UIU/ML (ref 0.36–3.74)
WBC # BLD AUTO: 10.35 THOUSAND/UL (ref 4.31–10.16)

## 2020-07-20 PROCEDURE — 84443 ASSAY THYROID STIM HORMONE: CPT

## 2020-07-20 PROCEDURE — 80053 COMPREHEN METABOLIC PANEL: CPT

## 2020-07-20 PROCEDURE — 36415 COLL VENOUS BLD VENIPUNCTURE: CPT

## 2020-07-20 PROCEDURE — 84481 FREE ASSAY (FT-3): CPT

## 2020-07-20 PROCEDURE — 85025 COMPLETE CBC W/AUTO DIFF WBC: CPT

## 2020-07-21 ENCOUNTER — TELEPHONE (OUTPATIENT)
Dept: UROLOGY | Facility: MEDICAL CENTER | Age: 80
End: 2020-07-21

## 2020-07-21 ENCOUNTER — OFFICE VISIT (OUTPATIENT)
Dept: INFECTIOUS DISEASES | Facility: CLINIC | Age: 80
End: 2020-07-21
Payer: MEDICARE

## 2020-07-21 VITALS
WEIGHT: 182 LBS | DIASTOLIC BLOOD PRESSURE: 70 MMHG | TEMPERATURE: 97.2 F | SYSTOLIC BLOOD PRESSURE: 136 MMHG | BODY MASS INDEX: 28.51 KG/M2 | HEART RATE: 71 BPM

## 2020-07-21 DIAGNOSIS — K80.20 CALCULUS OF GALLBLADDER WITHOUT CHOLECYSTITIS WITHOUT OBSTRUCTION: ICD-10-CM

## 2020-07-21 DIAGNOSIS — R74.01 TRANSAMINITIS: ICD-10-CM

## 2020-07-21 DIAGNOSIS — C67.2 MALIGNANT NEOPLASM OF LATERAL WALL OF URINARY BLADDER (HCC): ICD-10-CM

## 2020-07-21 DIAGNOSIS — E78.2 MIXED HYPERLIPIDEMIA: ICD-10-CM

## 2020-07-21 DIAGNOSIS — T50.A95S: ICD-10-CM

## 2020-07-21 DIAGNOSIS — E11.59 TYPE 2 DIABETES MELLITUS WITH OTHER CIRCULATORY COMPLICATION, WITHOUT LONG-TERM CURRENT USE OF INSULIN (HCC): Primary | ICD-10-CM

## 2020-07-21 PROCEDURE — 99204 OFFICE O/P NEW MOD 45 MIN: CPT | Performed by: INTERNAL MEDICINE

## 2020-07-21 NOTE — TELEPHONE ENCOUNTER
This is a patient of Dr Parth Allison in [facility]  Dr Parth Allison referrred patient to Dr Rubens Gardner and Dr Rubens Gardner wants to discuss the appointment  He said it is not urgent  Please have Dr Parth Allison call Dr Rubens Gardner  back at [phone]

## 2020-07-22 NOTE — TELEPHONE ENCOUNTER
I spoke to Dr Adriana Gates is office  There is some question regarding coverage of a repeat liver ultrasound  Infectious is disease did check with the patient's insurance and that should be covered without prior approval     I asked the staff to extend my thanks to Dr Adriana Gates

## 2020-07-23 ENCOUNTER — TELEPHONE (OUTPATIENT)
Dept: INTERNAL MEDICINE CLINIC | Facility: CLINIC | Age: 80
End: 2020-07-23

## 2020-07-23 NOTE — PROGRESS NOTES
Patient did see the gastroenterologist   They suggested the patient stop his simvastatin and metformin  I agree with stopping both these medications at this point time  He does have a appointment tomorrow to see his oncologist to discuss further treatment evaluation    We will see the patient on Monday

## 2020-07-23 NOTE — TELEPHONE ENCOUNTER
Patient saw Dr Jazmine Carmona and the doctor D/C metformin, tylenol, and simvastatin  Dr Jazmine Carmona asked the patient to call us and have you review any other medication that could effect his liver

## 2020-07-27 ENCOUNTER — TELEPHONE (OUTPATIENT)
Dept: INFECTIOUS DISEASES | Facility: CLINIC | Age: 80
End: 2020-07-27

## 2020-07-27 ENCOUNTER — APPOINTMENT (OUTPATIENT)
Dept: LAB | Facility: HOSPITAL | Age: 80
End: 2020-07-27
Attending: INTERNAL MEDICINE
Payer: MEDICARE

## 2020-07-27 ENCOUNTER — OFFICE VISIT (OUTPATIENT)
Dept: INTERNAL MEDICINE CLINIC | Facility: CLINIC | Age: 80
End: 2020-07-27
Payer: MEDICARE

## 2020-07-27 VITALS
TEMPERATURE: 96.5 F | SYSTOLIC BLOOD PRESSURE: 140 MMHG | HEART RATE: 58 BPM | HEIGHT: 67 IN | OXYGEN SATURATION: 98 % | BODY MASS INDEX: 30.13 KG/M2 | WEIGHT: 192 LBS | DIASTOLIC BLOOD PRESSURE: 80 MMHG | RESPIRATION RATE: 16 BRPM

## 2020-07-27 DIAGNOSIS — Z85.51 HISTORY OF BLADDER CANCER: ICD-10-CM

## 2020-07-27 DIAGNOSIS — T50.A95S: ICD-10-CM

## 2020-07-27 DIAGNOSIS — R94.5 LIVER FUNCTION STUDY, ABNORMAL: Primary | ICD-10-CM

## 2020-07-27 DIAGNOSIS — C67.2 MALIGNANT NEOPLASM OF LATERAL WALL OF URINARY BLADDER (HCC): ICD-10-CM

## 2020-07-27 DIAGNOSIS — I10 BENIGN ESSENTIAL HYPERTENSION: ICD-10-CM

## 2020-07-27 LAB
ALBUMIN SERPL BCP-MCNC: 3.2 G/DL (ref 3.5–5)
ALP SERPL-CCNC: 577 U/L (ref 46–116)
ALT SERPL W P-5'-P-CCNC: 291 U/L (ref 12–78)
AST SERPL W P-5'-P-CCNC: 185 U/L (ref 5–45)
BILIRUB DIRECT SERPL-MCNC: 0.31 MG/DL (ref 0–0.2)
BILIRUB SERPL-MCNC: 0.63 MG/DL (ref 0.2–1)
PROT SERPL-MCNC: 7.6 G/DL (ref 6.4–8.2)

## 2020-07-27 PROCEDURE — 1036F TOBACCO NON-USER: CPT | Performed by: INTERNAL MEDICINE

## 2020-07-27 PROCEDURE — 2026F EYE IMG VALID EVC RTNOPTHY: CPT | Performed by: INTERNAL MEDICINE

## 2020-07-27 PROCEDURE — 80076 HEPATIC FUNCTION PANEL: CPT | Performed by: INTERNAL MEDICINE

## 2020-07-27 PROCEDURE — 87116 MYCOBACTERIA CULTURE: CPT

## 2020-07-27 PROCEDURE — 3060F POS MICROALBUMINURIA REV: CPT | Performed by: INTERNAL MEDICINE

## 2020-07-27 PROCEDURE — 3077F SYST BP >= 140 MM HG: CPT | Performed by: INTERNAL MEDICINE

## 2020-07-27 PROCEDURE — 3079F DIAST BP 80-89 MM HG: CPT | Performed by: INTERNAL MEDICINE

## 2020-07-27 PROCEDURE — 36415 COLL VENOUS BLD VENIPUNCTURE: CPT | Performed by: INTERNAL MEDICINE

## 2020-07-27 PROCEDURE — 3066F NEPHROPATHY DOC TX: CPT | Performed by: INTERNAL MEDICINE

## 2020-07-27 PROCEDURE — 4040F PNEUMOC VAC/ADMIN/RCVD: CPT | Performed by: INTERNAL MEDICINE

## 2020-07-27 PROCEDURE — 3008F BODY MASS INDEX DOCD: CPT | Performed by: INTERNAL MEDICINE

## 2020-07-27 PROCEDURE — 3044F HG A1C LEVEL LT 7.0%: CPT | Performed by: INTERNAL MEDICINE

## 2020-07-27 PROCEDURE — 1160F RVW MEDS BY RX/DR IN RCRD: CPT | Performed by: INTERNAL MEDICINE

## 2020-07-27 PROCEDURE — 86480 TB TEST CELL IMMUN MEASURE: CPT

## 2020-07-27 PROCEDURE — 99214 OFFICE O/P EST MOD 30 MIN: CPT | Performed by: INTERNAL MEDICINE

## 2020-07-27 NOTE — ASSESSMENT & PLAN NOTE
Patient is here for re-evaluation  Are major concern today is problems with abnormalities with his liver function  The patient just had testing performed today showing some improvement after continued problems with elevation in although his liver enzymes    Again patient undergoing evaluation by GI specialist   Will talk to his oncologist tomorrow about possible side effects from the chemotherapy

## 2020-07-27 NOTE — TELEPHONE ENCOUNTER
Pt called this morning to let us know that at his [facility]nd [facility] treatment- he did not take his BP medication in the morning  His BP was high when it was taken prior to treatment  Therefore his treatment was delayed 1-2 hours until he was cleared for administration  Pt wanted Dr Moise Fleming to be aware  Routed note to Dr Moise Fleming

## 2020-07-27 NOTE — PROGRESS NOTES
Assessment/Plan:    Liver function study, abnormal  Patient is here for re-evaluation  Are major concern today is problems with abnormalities with his liver function  The patient just had testing performed today showing some improvement after continued problems with elevation in although his liver enzymes  Again patient undergoing evaluation by GI specialist   Will talk to his oncologist tomorrow about possible side effects from the chemotherapy    History of bladder cancer  Patient does have a history of cancer  Patient had undergone treatment with resection with recurrence of disease  Patient was sent to oncologist and they start chemotherapy, treatments  Patient did have a reaction to treatment and elevation in liver function tests  He states will discuss this with his oncologist tomorrow, and with his urologist an upcoming visit  Benign essential hypertension  Patient's blood pressure is slightly elevated today from his baseline  Patient will continue present medications surveillance  If his blood pressure is still rising would consider making some adjustments with medication with his next visit  Diagnoses and all orders for this visit:    Liver function study, abnormal    History of bladder cancer    Benign essential hypertension          Subjective:      Patient ID: Meg Salazar is a [de-identified] y o  male  77-year-old male history of multiple medical problems including coronary artery disease, diabetes mellitus type 2, hypertension, hyperlipidemia, diagnosed with bladder carcinoma status post resection with recurrence  Undergoing chemotherapy a complication is elevation liver function tests  Undergoing workup and evaluation for this  Patient states in general he is doing relatively well and has no new complaints or problems  Had repeat lab testing performed today and this does show some improvement        The following portions of the patient's history were reviewed and updated as appropriate: He  has a past medical history of Acute kidney injury ([facility] ), Arthritis, Wright esophagus, BPH with obstruction/lower urinary tract symptoms, CAD (coronary artery disease), Cancer ([facility] ), Cataract, acquired, Diabetes mellitus ([facility] ), Diabetic neuropathy ([facility] ), Enlarged prostate with lower urinary tract symptoms (LUTS), Erectile dysfunction, GERD (gastroesophageal reflux disease), Hemoptysis, Hypercholesterolemia, Hypertension, Macular degeneration, Myocardial infarction ([facility] ) (1998), OAB (overactive bladder), Testicular hypofunction, Testicular hypogonadism, Tinnitus, Umbilical hernia, Urge incontinence of urine, and Wears glasses    He   Patient Active Problem List    Diagnosis Date Noted    Transaminitis 07/21/2020    Liver function study, abnormal 06/25/2020    Malignant neoplasm of urinary bladder neck ([facility] ) 03/24/2020    Positive urinary cytology 11/05/2019    Coronary artery disease involving native coronary artery of native heart without angina pectoris 09/09/2019    Ischemic cardiomyopathy 09/09/2019    History of bladder cancer 07/30/2019    Medicare annual wellness visit, subsequent 05/23/2019    Closed fracture of upper end of right fibula 03/11/2019    Obesity (BMI 30 0-34 9) 01/22/2019    Malignant neoplasm of lateral wall of urinary bladder ([facility] ) 01/10/2019    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 01/10/2019    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/2019    Type 2 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 ([facility] ) 12/05/2018    Bladder tumor 11/01/2018    Overactive bladder 10/10/2018    Acute kidney injury ([facility] ) 05/30/2018    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    Backache 10/21/2013    Benign essential hypertension 10/11/2012    DMII (diabetes mellitus, type 2) ([facility] ) 10/11/2012    Hyperlipidemia 10/11/2012     He  has a past surgical history that includes pr colonoscopy flx dx w/collj spec when pfrmd (N/A, 4/25/2016); Cystoscopy (2013); Coronary artery bypass graft (07/16/2014); Colonoscopy; Inguinal hernia repair (Bilateral, 2015); Umbilical hernia repair (2012); Esophagogastroduodenoscopy; Tonsillectomy; Photodynamic Therapy; Transurethral resection of prostate (N/A, 12/20/2018); [place] retrograde pyelogram (12/20/2018); pr cystourethroscopy,fulgur <0 5 cm lesn (N/A, 12/5/2019); [place] retrograde pyelogram (12/5/2019); and pr cystourethroscopy,fulgur 2-5 cm lesn (N/A, 4/16/2020)  His family history includes Cancer in his mother; Coronary artery disease in his father; Diabetes in his father; Heart disease in his father; Hyperlipidemia in his father; Hypertension in his father; Other in his mother  He  reports that he quit smoking about 48 years ago  His smoking use included cigarettes  He has a 13 00 pack-year smoking history  He has never used smokeless tobacco  He reports that he drinks about 2 0 standard drinks of alcohol per week  He reports that he does not use drugs  Current Outpatient Medications   Medication Sig Dispense Refill    Cholecalciferol (VITAMIN D) 50 MCG (2000 UT) tablet Take 2,000 Units by mouth daily      irbesartan (AVAPRO) 300 mg tablet TAKE ONE TABLET BY MOUTH EVERY DAY 90 tablet 3    metoprolol tartrate (LOPRESSOR) 50 mg tablet TAKE ONE TABLET BY MOUTH TWICE A  tablet 4    Multiple Vitamins-Minerals (CENTRUM SILVER 50+MEN PO) Take 1 tablet by mouth daily        Multiple Vitamins-Minerals (OCUVITE-LUTEIN PO) Take 1 tablet by mouth 2 (two) times a day        omeprazole (PriLOSEC) 40 MG capsule Take 40 mg by mouth daily at bedtime        solifenacin (VESICARE) 10 MG tablet TAKE ONE TABLET BY MOUTH EVERY DAY 90 tablet 2    Bilberry 60 MG CAPS Take 1 capsule by mouth daily      metFORMIN (GLUCOPHAGE-XR) 500 mg 24 hr tablet TAKE TWO TABLETS BY MOUTH TWICE A DAY WITH MEALS (Patient not taking: No sig reported) 360 tablet 0    NON FORMULARY Take by mouth Saffron extract 88  5 mg 1 tab daily      Omega-3 Fatty Acids (FISH OIL) 1200 MG CAPS Take 2 capsules by mouth daily       phenazopyridine (PYRIDIUM) 200 mg tablet Take 1 tablet (200 mg total) by mouth 3 (three) times a day as needed for bladder spasms (Dysuria) (Patient not taking: Reported on 7/7/2020) 10 tablet 0    simvastatin (ZOCOR) 20 mg tablet Take 20 mg by mouth daily at bedtime       No current facility-administered medications for this visit  Current Outpatient Medications on File Prior to Visit   Medication Sig    Cholecalciferol (VITAMIN D) 50 MCG (2000 UT) tablet Take 2,000 Units by mouth daily    irbesartan (AVAPRO) 300 mg tablet TAKE ONE TABLET BY MOUTH EVERY DAY    metoprolol tartrate (LOPRESSOR) 50 mg tablet TAKE ONE TABLET BY MOUTH TWICE A DAY    Multiple Vitamins-Minerals (CENTRUM SILVER 50+MEN PO) Take 1 tablet by mouth daily      Multiple Vitamins-Minerals (OCUVITE-LUTEIN PO) Take 1 tablet by mouth 2 (two) times a day      omeprazole (PriLOSEC) 40 MG capsule Take 40 mg by mouth daily at bedtime      solifenacin (VESICARE) 10 MG tablet TAKE ONE TABLET BY MOUTH EVERY DAY    Bilberry 60 MG CAPS Take 1 capsule by mouth daily    metFORMIN (GLUCOPHAGE-XR) 500 mg 24 hr tablet TAKE TWO TABLETS BY MOUTH TWICE A DAY WITH MEALS (Patient not taking: No sig reported)    NON FORMULARY Take by mouth Saffron extract 88  5 mg 1 tab daily    Omega-3 Fatty Acids (FISH OIL) 1200 MG CAPS Take 2 capsules by mouth daily     phenazopyridine (PYRIDIUM) 200 mg tablet Take 1 tablet (200 mg total) by mouth 3 (three) times a day as needed for bladder spasms (Dysuria) (Patient not taking: Reported on 7/7/2020)    simvastatin (ZOCOR) 20 mg tablet Take 20 mg by mouth daily at bedtime     No current facility-administered medications on file prior to visit  He is allergic to morphine and related       Review of Systems   Constitutional: Negative  HENT: Negative  Eyes: Negative  Respiratory: Negative  Cardiovascular: Negative  Gastrointestinal: Negative  Endocrine: Negative  Genitourinary: Negative  Musculoskeletal: Negative  Skin: Negative  Allergic/Immunologic: Negative  Neurological: Negative  Hematological: Negative  Psychiatric/Behavioral: Negative  Objective:      /80 (BP Location: Right arm, Patient Position: Sitting, Cuff Size: Adult)   Pulse 58   Temp (!) 96 5 °F (35 8 °C) (Tympanic)   Resp 16   Ht 5' 7" (1 702 m)   Wt 87 1 kg (192 lb)   SpO2 98%   BMI 30 07 kg/m²          Physical Exam   Constitutional: He appears well-developed and well-nourished  No distress  Pleasant, obese, articulate 59-year-old male who is awake alert   HENT:   Head: Normocephalic and atraumatic  Right Ear: External ear normal    Left Ear: External ear normal    Nose: Nose normal    Mouth/Throat: Oropharynx is clear and moist  No oropharyngeal exudate  Eyes: Pupils are equal, round, and reactive to light  Conjunctivae and EOM are normal  Right eye exhibits no discharge  Left eye exhibits no discharge  No scleral icterus  Neck: Normal range of motion  Neck supple  No JVD present  No tracheal deviation present  No thyromegaly present  Cardiovascular: Normal rate, regular rhythm, normal heart sounds and intact distal pulses  Exam reveals no gallop and no friction rub  No murmur heard  Pulmonary/Chest: Effort normal and breath sounds normal  No stridor  No respiratory distress  He has no wheezes  He has no rales  He exhibits no tenderness  Abdominal: Soft  Bowel sounds are normal  He exhibits no distension and no mass  There is no tenderness  There is no rebound and no guarding  No hernia  Obese   Musculoskeletal: Normal range of motion  Lymphadenopathy:     He has no cervical adenopathy  Neurological: He is alert  He displays normal reflexes  A sensory deficit is present  No cranial nerve deficit  He exhibits normal muscle tone  Coordination normal    Skin: Skin is warm and dry   He is not diaphoretic  Psychiatric: He has a normal mood and affect  His behavior is normal  Judgment and thought content normal    Nursing note and vitals reviewed

## 2020-07-27 NOTE — ASSESSMENT & PLAN NOTE
Patient does have a history of cancer  Patient had undergone treatment with resection with recurrence of disease  Patient was sent to oncologist and they start chemotherapy, treatments  Patient did have a reaction to treatment and elevation in liver function tests  He states will discuss this with his oncologist tomorrow, and with his urologist an upcoming visit

## 2020-07-27 NOTE — ASSESSMENT & PLAN NOTE
Patient's blood pressure is slightly elevated today from his baseline  Patient will continue present medications surveillance  If his blood pressure is still rising would consider making some adjustments with medication with his next visit

## 2020-07-28 ENCOUNTER — TELEPHONE (OUTPATIENT)
Dept: INTERNAL MEDICINE CLINIC | Facility: CLINIC | Age: 80
End: 2020-07-28

## 2020-07-28 ENCOUNTER — OFFICE VISIT (OUTPATIENT)
Dept: HEMATOLOGY ONCOLOGY | Facility: CLINIC | Age: 80
End: 2020-07-28
Payer: MEDICARE

## 2020-07-28 ENCOUNTER — PATIENT OUTREACH (OUTPATIENT)
Dept: HEMATOLOGY ONCOLOGY | Facility: CLINIC | Age: 80
End: 2020-07-28

## 2020-07-28 ENCOUNTER — HOSPITAL ENCOUNTER (OUTPATIENT)
Dept: RADIOLOGY | Facility: HOSPITAL | Age: 80
Discharge: HOME | End: 2020-07-28
Attending: UROLOGY
Payer: MEDICARE

## 2020-07-28 ENCOUNTER — TRANSCRIBE ORDERS (OUTPATIENT)
Dept: RADIOLOGY | Facility: HOSPITAL | Age: 80
End: 2020-07-28

## 2020-07-28 VITALS
WEIGHT: 190.4 LBS | OXYGEN SATURATION: 97 % | DIASTOLIC BLOOD PRESSURE: 80 MMHG | RESPIRATION RATE: 16 BRPM | HEART RATE: 53 BPM | BODY MASS INDEX: 29.88 KG/M2 | HEIGHT: 67 IN | SYSTOLIC BLOOD PRESSURE: 142 MMHG | TEMPERATURE: 96.9 F

## 2020-07-28 DIAGNOSIS — C67.2 MALIGNANT NEOPLASM OF LATERAL WALL OF URINARY BLADDER (HCC): Primary | ICD-10-CM

## 2020-07-28 PROCEDURE — 76705 ECHO EXAM OF ABDOMEN: CPT

## 2020-07-28 PROCEDURE — 4040F PNEUMOC VAC/ADMIN/RCVD: CPT | Performed by: INTERNAL MEDICINE

## 2020-07-28 PROCEDURE — 2026F EYE IMG VALID EVC RTNOPTHY: CPT | Performed by: INTERNAL MEDICINE

## 2020-07-28 PROCEDURE — 99214 OFFICE O/P EST MOD 30 MIN: CPT | Performed by: INTERNAL MEDICINE

## 2020-07-28 PROCEDURE — 1160F RVW MEDS BY RX/DR IN RCRD: CPT | Performed by: INTERNAL MEDICINE

## 2020-07-28 PROCEDURE — 1036F TOBACCO NON-USER: CPT | Performed by: INTERNAL MEDICINE

## 2020-07-28 PROCEDURE — 3060F POS MICROALBUMINURIA REV: CPT | Performed by: INTERNAL MEDICINE

## 2020-07-28 PROCEDURE — 3066F NEPHROPATHY DOC TX: CPT | Performed by: INTERNAL MEDICINE

## 2020-07-28 PROCEDURE — 3044F HG A1C LEVEL LT 7.0%: CPT | Performed by: INTERNAL MEDICINE

## 2020-07-28 PROCEDURE — 3077F SYST BP >= 140 MM HG: CPT | Performed by: INTERNAL MEDICINE

## 2020-07-28 PROCEDURE — 3008F BODY MASS INDEX DOCD: CPT | Performed by: INTERNAL MEDICINE

## 2020-07-28 PROCEDURE — 3079F DIAST BP 80-89 MM HG: CPT | Performed by: INTERNAL MEDICINE

## 2020-07-28 NOTE — PROGRESS NOTES
Javan Jonas had visit with Dr Donovan Billingsley today  He has bladder cancer on immunotherapy  Dr Donovan Billingsley is holding his next infusion due to elevated liver enzymes  He is requesting Javan Jonas to have cystoscopy to check response  He is currently scheduled to see Dr Salina Castañeda 8/13/20  Can he have cysto at that visit or does it need to be rescheduled? Please contact patient if it needs to be rescheduled

## 2020-07-28 NOTE — PROGRESS NOTES
LMOM to call back to reschedule appt  Dr Katrina Cain has procedure openings on 8/25/20 for a cystoscopy  If he wants to be seen sooner, Dr Justine Gerardo has procedure openings also

## 2020-07-28 NOTE — PROGRESS NOTES
Hematology Outpatient Follow - Up Note  Josse Frost [de-identified] y o  male MRN: @ Encounter: [id number]        Date:  7/28/2020        Assessment/ Plan:    He is 41-year-old  male with persistent superficial bladder cancer refractory to BCG cystoscopy in April 2020 showed persistent superficial bladder cancer with carcinoma in situ no evidence of invasive component    Treated with Pembrolizumab 200 mg flat dose on 05/26/2020 he received 2 doses complicated with elevation of liver enzymes grade 3 requiring discontinuation of Pembrolizumab, now liver enzymes are trending down slowly    I had long discussion with the patient and his wife I do not believe he is candidate for rechallenge with Pembrolizumab at this time, the patient and his wife were frustrated    Hoping to have a response in the next cystoscopy    Otherwise he might be rechallenged with prednisone 10 mg p o   Daily follow-up in 6 weeks            HPI:  41-year-old  male with history of hypertension, coronary artery disease status post open heart surgery, diabetes mellitus type 2, diabetic neuropathy, he had a history of recurrent superficial bladder cancer status post many BCG unfortunately refractory to BCG, he had 1 time trans urethral resection of the bladder tumor and mitomycin instillation     Repeat cystoscopy on 04/16/2020 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, no evidence of invasive bladder cancers     He does not smoke, he drinks bourbon and vodka every night     Denies any headache blurred vision nausea vomiting diarrhea constipation dysuria hematuria melena hematochezia heat or cold intolerance  Initiated on Pembrolizumab 200 mg flat dose every 3 weeks on 05/26/2020, after 2 doses there was grade 3 elevation in the liver enzyme, alkaline phosphatase requiring discontinuation of Pembrolizumab,  Interval History:  Liver enzymes are trending down slowly he is asymptomatic        Previous Treatment:         Test Results:    Imaging: Us Abdomen Complete    Result Date: 7/7/2020  Narrative: ABDOMEN ULTRASOUND, COMPLETE INDICATION:   R94 5: Abnormal results of liver function studies  COMPARISON: None TECHNIQUE:   Real-time ultrasound of the abdomen was performed with a curvilinear transducer with both volumetric sweeps and still imaging techniques  FINDINGS: PANCREAS:  Visualized portions of the pancreas are within normal limits  AORTA AND IVC:  Visualized portions are normal for patient age  LIVER: Size:  Within normal range  The liver measures 15 2 cm in the midclavicular line  Contour:  Surface contour is smooth  Parenchyma:  Echogenicity and echotexture are within normal limits  No evidence of suspicious mass  Limited imaging of the main portal vein shows it to be patent and hepatopetal  BILIARY: Gallstones are seen in the gallbladder  No sonographic Short's sign is elicited  No intrahepatic biliary dilatation  CBD measures 4 mm  No choledocholithiasis  KIDNEY: Right kidney measures 10 6 cm  Within normal limits  Left kidney measures 9 4 cm  Within normal limits  SPLEEN: Measures 8 0 cm  Within normal limits  ASCITES:  None  Impression: Cholelithiasis   Workstation performed: [id number]       Labs:   Lab Results   Component Value Date    WBC 10 35 (H) 07/20/2020    HGB 14 0 07/20/2020    HCT 41 9 07/20/2020    MCV 94 07/20/2020     07/20/2020     Lab Results   Component Value Date     08/13/2015    K 4 0 07/20/2020     07/20/2020    CO2 25 07/20/2020    ANIONGAP 10 08/13/2015    BUN 21 07/20/2020    CREATININE 1 35 (H) 07/20/2020    GLUCOSE 128 08/13/2015    GLUF 155 (H) 07/20/2020    CALCIUM 9 7 07/20/2020     (H) 07/27/2020     (H) 07/27/2020    ALKPHOS 577 (H) 07/27/2020    PROT 6 8 08/13/2015    BILITOT 0 58 08/13/2015    EGFR 50 07/20/2020       No results found for: IRON, TIBC, FERRITIN    Lab Results   Component Value Date    [id number] 666 08/13/2015 ROS: Review of Systems   Constitutional: Negative  Negative for appetite change, chills, diaphoresis, fatigue, fever and unexpected weight change  HENT:   Negative for hearing loss, lump/mass, mouth sores, nosebleeds, sore throat, trouble swallowing and voice change  Eyes: Negative  Negative for eye problems and icterus  Respiratory: Negative  Negative for chest tightness, cough, hemoptysis and shortness of breath  Cardiovascular: Negative for chest pain and leg swelling  Gastrointestinal: Negative for abdominal distention, abdominal pain, blood in stool, constipation, diarrhea and nausea  Endocrine: Negative  Genitourinary: Negative for dysuria, frequency, hematuria and pelvic pain  Musculoskeletal: Negative  Negative for arthralgias, back pain, flank pain, gait problem, myalgias and neck stiffness  Skin: Negative for itching and rash  Neurological: Negative for dizziness, gait problem, headaches, light-headedness, numbness and speech difficulty  Hematological: Negative for adenopathy  Does not bruise/bleed easily  Psychiatric/Behavioral: Negative for confusion, decreased concentration, depression and sleep disturbance  The patient is not nervous/anxious  Current Medications: Reviewed  Allergies: Reviewed  PMH/FH/SH:  Reviewed      Physical Exam:    Body surface area is 1 98 meters squared  Wt Readings from Last 3 Encounters:   07/28/20 86 4 kg (190 lb 6 4 oz)   07/27/20 87 1 kg (192 lb)   07/21/20 82 6 kg (182 lb)        Temp Readings from Last 3 Encounters:   07/28/20 (!) 96 9 °F (36 1 °C) (Tympanic)   07/27/20 (!) 96 5 °F (35 8 °C) (Tympanic)   07/21/20 (!) 97 2 °F (36 2 °C)        BP Readings from Last 3 Encounters:   07/28/20 142/80   07/27/20 140/80   07/21/20 136/70         Pulse Readings from Last 3 Encounters:   07/28/20 (!) 53   07/27/20 58   07/21/20 71        Physical Exam   Constitutional: He is oriented to person, place, and time   He appears well-developed and well-nourished  No distress  HENT:   Head: Normocephalic and atraumatic  Eyes: Conjunctivae are normal    Neck: Normal range of motion  Neck supple  No tracheal deviation present  Cardiovascular: Normal rate and regular rhythm  Exam reveals no gallop and no friction rub  No murmur heard  Pulmonary/Chest: Effort normal and breath sounds normal  No respiratory distress  He has no wheezes  He has no rales  He exhibits no tenderness  Abdominal: Soft  He exhibits no distension  There is no tenderness  Musculoskeletal: He exhibits no edema or tenderness  Lymphadenopathy:     He has no cervical adenopathy  Neurological: He is alert and oriented to person, place, and time  Skin: Skin is warm and dry  He is not diaphoretic  No erythema  No pallor  Psychiatric: He has a normal mood and affect  His behavior is normal  Judgment and thought content normal    Vitals reviewed  ECO    Goals and Barriers:  Current Goal: Minimize effects of disease  Barriers: None  Patient's Capacity to Self Care:  Patient is able to self care      Code Status: @[facility]@

## 2020-07-29 LAB
GAMMA INTERFERON BACKGROUND BLD IA-ACNC: 0.05 IU/ML
M TB IFN-G BLD-IMP: NEGATIVE
M TB IFN-G CD4+ BCKGRND COR BLD-ACNC: 0 IU/ML
M TB IFN-G CD4+ BCKGRND COR BLD-ACNC: 0.01 IU/ML
MITOGEN IGNF BCKGRD COR BLD-ACNC: 6.07 IU/ML

## 2020-07-31 NOTE — TELEPHONE ENCOUNTER
The patient is scheduled for an office visit with me on August 13 to do paperwork for an outpatient cysto and bladder biopsy  I think that would be a better approach then to perform a cystoscopy in the office without biopsy  Please let me know if this is satisfactory  Thank you

## 2020-08-03 ENCOUNTER — OFFICE VISIT (OUTPATIENT)
Dept: GASTROENTEROLOGY | Facility: CLINIC | Age: 80
End: 2020-08-03
Payer: MEDICARE

## 2020-08-03 ENCOUNTER — PATIENT OUTREACH (OUTPATIENT)
Dept: UROLOGY | Facility: CLINIC | Age: 80
End: 2020-08-03

## 2020-08-03 VITALS
HEART RATE: 56 BPM | DIASTOLIC BLOOD PRESSURE: 70 MMHG | WEIGHT: 188.8 LBS | SYSTOLIC BLOOD PRESSURE: 168 MMHG | BODY MASS INDEX: 29.63 KG/M2 | HEIGHT: 67 IN | TEMPERATURE: 97.2 F

## 2020-08-03 DIAGNOSIS — R94.5 LIVER FUNCTION STUDY, ABNORMAL: ICD-10-CM

## 2020-08-03 PROCEDURE — 99204 OFFICE O/P NEW MOD 45 MIN: CPT | Performed by: INTERNAL MEDICINE

## 2020-08-03 PROCEDURE — 3044F HG A1C LEVEL LT 7.0%: CPT | Performed by: INTERNAL MEDICINE

## 2020-08-03 NOTE — PROGRESS NOTES
Spoke to Matias  Reviewed with him that cysto scheduled for tomorrow was canceled and instructed him to keep follow up with Dr Joanne Mahan as scheduled on 8/13/20  He verbalized understanding

## 2020-08-03 NOTE — PROGRESS NOTES
[facility] - Outpatient Consultation  Hardeep Clemons [de-identified] y o  male MRN: [id number]  Encounter: [id number]          ASSESSMENT AND PLAN:      1  Elevated liver enzymes  - discussed the elevated liver enzymes were induced by the [place]  - thankfully, the patients liver enzymes are already improving since stopping [place]  - discussed steroid use to help normalize the enzymes at a faster rate, however steroids will suppress the immune system which could lead to increased risk of infection   - will hold on steroids given the risks and the fact the enzymes are normalizing on their own, especially in wake of the current COVID 19  pandemic  - if liver enzymes were to rise would re-think steroid use   - LUIS Screen w/ Reflex to Titer/Pattern; Future  - Anti-smooth muscle antibody, IgG; Future  - Chronic Hepatitis Panel; Future  - Iron Panel (Includes Ferritin, Iron Sat%, Iron, and TIBC); Future  - Hepatitis A antibody, IgM; Future    2  Livery cyst -   A 10 mm cyst in the left lobe with thin septation  This cyst was also seen on CT scan 2 years ago  No concerning features for malignancy  I do not recommend further workup    ______________________________________________________________________    HPI: [de-identified]year old male with a history of hypertension, coronary artery disease status post open heart surgery, diabetes mellitus type 2, diabetic neuropathy, he had a history of recurrent superficial bladder cancer  He is being seen in consultation for elevated liver enzymes  The patient has had no prior liver disease  He denies any nausea and vomiting  Has no abdominal pain  The patient was previously on [facility]YSIC[facility] for his bladder cancer  It is thought that this is the cause of the patients abnormal liver enzymes, but he presents for out opinion on this  The patient was a previous drinker  Was drinking daily, 1-2 vodka tonics prior to dinner  Stopped July 14th  Has no other GI complaints today  REVIEW OF SYSTEMS:    CONSTITUTIONAL: Denies any fever, chills, rigors, and weight loss  HEENT: No earache or tinnitus  Denies hearing loss or visual disturbances  CARDIOVASCULAR: No chest pain or palpitations  RESPIRATORY: Denies any cough, hemoptysis, shortness of breath or dyspnea on exertion  GASTROINTESTINAL: As noted in the History of Present Illness  GENITOURINARY: No problems with urination  Denies any hematuria or dysuria  NEUROLOGIC: No dizziness or vertigo, denies headaches  MUSCULOSKELETAL: Denies any muscle or joint pain  SKIN: Denies skin rashes or itching  ENDOCRINE: Denies excessive thirst  Denies intolerance to heat or cold  PSYCHOSOCIAL: Denies depression or anxiety  Denies any recent memory loss         Historical Information   Past Medical History:   Diagnosis Date    Acute kidney injury ([facility] [address] )     Arthritis     Hands    Wright esophagus     BPH with obstruction/lower urinary tract symptoms     CAD (coronary artery disease)     Last assessed 09/16/15    Cancer ([facility] [address] )     bladder    Cataract, acquired     Last assessed 10/10/17    Diabetes mellitus ([facility] [address] )     NIDDM    Diabetic neuropathy ([facility] [address] )     Feet    Enlarged prostate with lower urinary tract symptoms (LUTS)     Last assessed 10/10/17    Erectile dysfunction     GERD (gastroesophageal reflux disease)     Last assessed 10/10/17    Hemoptysis     Last assessed 03/14/16    Hypercholesterolemia     Last assessed 10/10/17    Hypertension     Last assessed 10/10/17    Macular degeneration     Right eye is particularly affected    Myocardial infarction ([facility] [address] ) 1998    OAB (overactive bladder)     Testicular hypofunction     Testicular hypogonadism     Last assessed 10/10/17    Tinnitus     Umbilical hernia     Last assessed 06/18/14    Urge incontinence of urine     Wears glasses      Past Surgical History:   Procedure Laterality Date    COLONOSCOPY      CORONARY ARTERY BYPASS GRAFT  07/16/2014    ABG x 4 LIMA to LAD,SVG to diagnoal 2 SVG to OM-1, SVG to PDA, resection of partial plerual mass    CYSTOSCOPY  2013    ESOPHAGOGASTRODUODENOSCOPY      FL RETROGRADE PYELOGRAM  2018    FL RETROGRADE PYELOGRAM  2019    INGUINAL HERNIA REPAIR Bilateral 2015    PHOTODYNAMIC THERAPY      For Barretts esophagus    SD COLONOSCOPY FLX DX W/COLLJ SPEC WHEN PFRMD N/A 2016    Procedure: COLONOSCOPY;  Surgeon: Tony Warren MD;  [facility]; Service: Gastroenterology    SD CYSTOURETHROSCOPY,FULGUR 2-5 CM LESN N/A 2020    Procedure: CYSTOSCOPY, TRANSURETHRAL RESECTION OF BLADDER TUMOR (TURBT);   Surgeon: Jaye Menendez MD;  [facility];  Service: Urology    SD CYSTOURETHROSCOPY,FULGUR <0 5 CM LESN N/A 2019    Procedure: CYSTO W/TURBT AND TRANSURETHRAL PROSTATE BIOPSY AND OPENING OF BLADDER NECK CONTRACTURE, B/L Retrograde pyelogram;  Surgeon: Jaye Mennedez MD;  [facility];  Service: Urology    TONSILLECTOMY      TRANSURETHRAL RESECTION OF PROSTATE N/A 2018    Procedure: CYSTO, PHOTO SELECTIVE VAPORIZATION OF PROSTATE, B/L RETROGRADE PYELOGRAM, TURBT;  Surgeon: Jaye Menendez MD;  [facility];  Service: Urology    UMBILICAL HERNIA REPAIR  2012    UPPER GASTROINTESTINAL ENDOSCOPY       Social History   Social History     Substance and Sexual Activity   Alcohol Use Yes    Alcohol/week: 2 0 standard drinks    Types: 1 Glasses of wine, 1 Cans of beer per week    Drinks per session: 1 or 2    Binge frequency: Daily or almost daily    Comment: 1 drink daily- a mixed drink or wine     Social History     Substance and Sexual Activity   Drug Use No     Social History     Tobacco Use   Smoking Status Former Smoker    Packs/day: 1 00    Years: 13 00    Pack years: 13 00    Types: Cigarettes    Last attempt to quit: 1972    Years since quittin 6   Smokeless Tobacco Never Used     Family History   Problem Relation Age of Onset    Cancer Mother Una Taylor Other Mother         Digestive System Complications    Diabetes Father     Heart disease Father     Hypertension Father     Coronary artery disease Father     Hyperlipidemia Father        Meds/Allergies       Current Outpatient Medications:     Cholecalciferol (VITAMIN D) 50 MCG (2000 UT) tablet    irbesartan (AVAPRO) 300 mg tablet    metFORMIN (GLUCOPHAGE-XR) 500 mg 24 hr tablet    metoprolol tartrate (LOPRESSOR) 50 mg tablet    MULTIPLE VITAMIN PO    Multiple Vitamins-Minerals (CENTRUM SILVER 50+MEN PO)    Multiple Vitamins-Minerals (OCUVITE-LUTEIN PO)    omeprazole (PriLOSEC) 40 MG capsule    phenazopyridine (PYRIDIUM) 200 mg tablet    solifenacin (VESICARE) 10 MG tablet    Bilberry 60 MG CAPS    NON FORMULARY    Omega-3 Fatty Acids (FISH OIL) 1200 MG CAPS    simvastatin (ZOCOR) 20 mg tablet    Allergies   Allergen Reactions    Morphine And Related Other (See Comments)     While having an MI patient received morphine and had adverse reaction but doesn't know what happened  Objective     Blood pressure 168/70, pulse 56, temperature (!) 97 2 °F (36 2 °C), temperature source Tympanic, height 5' 7", weight 85 6 kg (188 lb 12 8 oz)  Body mass index is 29 57 kg/m²  PHYSICAL EXAM:      General Appearance:   Alert, cooperative, no distress   HEENT:   Normocephalic, atraumatic, anicteric      Neck:  Supple, symmetrical, trachea midline   Lungs:   Clear to auscultation bilaterally; no rales, rhonchi or wheezing; respirations unlabored    Heart[de-identified]   Regular rate and rhythm; no murmur, rub, or gallop  Abdomen:   Soft, non-tender, non-distended; normal bowel sounds; no masses, no organomegaly    Genitalia:   Deferred    Rectal:   Deferred    Extremities:  No cyanosis, clubbing or edema    Pulses:  2+ and symmetric    Skin:  No jaundice, rashes, or lesions    Lymph nodes:  No palpable cervical lymphadenopathy        Lab Results:   No visits with results within 1 Day(s) from this visit  Latest known visit with results is:   Appointment on 07/27/2020   Component Date Value    QFT Nil 07/27/2020 0 05     QFT TB1-NIL 07/27/2020 0 00     QFT TB2-NIL 07/27/2020 0 01     QFT Mitogen-NIL 07/27/2020 6 07     QFT Final Interpretation 07/27/2020 Negative     AFB Blood Culture 07/27/2020 No growth to date          Radiology Results:   Us Liver    Result Date: 7/30/2020  Narrative: RIGHT UPPER QUADRANT ULTRASOUND INDICATION:     T50 A95S: Adverse effect of other bacterial vaccines, sequela  COMPARISON:  Ultrasound abdomen 7/1/2020 and additional priors TECHNIQUE:   Real-time ultrasound of the right upper quadrant was performed with a curvilinear transducer with both volumetric sweeps and still imaging techniques  FINDINGS: PANCREAS:  Visualized portions of the pancreas are within normal limits  AORTA AND IVC:  Visualized portions are normal for patient age  LIVER: Size:  Within normal range  The liver measures 14 3 cm in the midclavicular line  Contour:  Surface contour is smooth  Parenchyma:  Echogenicity and echotexture are within normal limits  No evidence of suspicious mass  Small cyst in the left lobe laterally measures up to 10 mm in size  Suggestion of thin internal septations  This is not seen on prior ultrasound but likely present on CT of 11/15/2018  Limited imaging of the main portal vein shows it to be patent and hepatopetal   BILIARY: The gallbladder is normal in caliber  No wall thickening or pericholecystic fluid  Shadowing gallstone(s) identified  No sonographic Short's sign  No intrahepatic biliary dilatation  CBD measures 4 mm  No choledocholithiasis  KIDNEY: Right kidney measures 10 0 x 5 2 cm  Within normal limits  ASCITES:   None  Impression: 1  Now seen is a small mildly complex cyst at the left lobe of the liver laterally  This is not seen on prior ultrasound but likely present on CT of 11/15/2018  Liver is otherwise unremarkable  2   Cholelithiasis   Workstation performed: [id number]

## 2020-08-03 NOTE — PROGRESS NOTES
Received email from Dr Jostin Pro and Dr Hilda Devlin  Plan is for Tatiana Moreno to keep follow up as scheduled with Dr Jostin Pro on 8/13/20 to get consents and schedule cysto/ bladder bx in the hospital  [facility] and canceled his office cysto scheduled for tomorrow with Dr José Luis Elias  He was instructed to keep follow up with Dr Jostin Pro on 8/13/20

## 2020-08-03 NOTE — PROGRESS NOTES
Patient returned advised of appointment on 8/13/2020  Patient request a call back from [place] for more clarification as he is still confused with the dates  Please advise

## 2020-08-04 ENCOUNTER — APPOINTMENT (OUTPATIENT)
Dept: LAB | Facility: CLINIC | Age: 80
End: 2020-08-04
Payer: MEDICARE

## 2020-08-04 DIAGNOSIS — R94.5 LIVER FUNCTION STUDY, ABNORMAL: ICD-10-CM

## 2020-08-04 DIAGNOSIS — C67.2 MALIGNANT NEOPLASM OF LATERAL WALL OF URINARY BLADDER (HCC): ICD-10-CM

## 2020-08-04 LAB
ALBUMIN SERPL BCP-MCNC: 3.4 G/DL (ref 3.5–5)
ALP SERPL-CCNC: 390 U/L (ref 46–116)
ALT SERPL W P-5'-P-CCNC: 126 U/L (ref 12–78)
ANION GAP SERPL CALCULATED.3IONS-SCNC: 7 MMOL/L (ref 4–13)
AST SERPL W P-5'-P-CCNC: 51 U/L (ref 5–45)
BASOPHILS # BLD AUTO: 0.07 THOUSANDS/ΜL (ref 0–0.1)
BASOPHILS NFR BLD AUTO: 1 % (ref 0–1)
BILIRUB SERPL-MCNC: 0.66 MG/DL (ref 0.2–1)
BUN SERPL-MCNC: 23 MG/DL (ref 5–25)
CALCIUM ALBUM COR SERPL-MCNC: 11 MG/DL (ref 8.3–10.1)
CALCIUM SERPL-MCNC: 10.5 MG/DL (ref 8.3–10.1)
CHLORIDE SERPL-SCNC: 102 MMOL/L (ref 100–108)
CO2 SERPL-SCNC: 29 MMOL/L (ref 21–32)
CREAT SERPL-MCNC: 1.48 MG/DL (ref 0.6–1.3)
CREAT UR-MCNC: 17.4 MG/DL
EOSINOPHIL # BLD AUTO: 0.33 THOUSAND/ΜL (ref 0–0.61)
EOSINOPHIL NFR BLD AUTO: 3 % (ref 0–6)
ERYTHROCYTE [DISTWIDTH] IN BLOOD BY AUTOMATED COUNT: 14.1 % (ref 11.6–15.1)
FERRITIN SERPL-MCNC: 166 NG/ML (ref 8–388)
GFR SERPL CREATININE-BSD FRML MDRD: 44 ML/MIN/1.73SQ M
GLUCOSE SERPL-MCNC: 174 MG/DL (ref 65–140)
HAV IGM SER QL: NORMAL
HBV CORE AB SER QL: NORMAL
HBV CORE IGM SER QL: NORMAL
HBV SURFACE AG SER QL: NORMAL
HCT VFR BLD AUTO: 44.6 % (ref 36.5–49.3)
HCV AB SER QL: NORMAL
HGB BLD-MCNC: 14.4 G/DL (ref 12–17)
IMM GRANULOCYTES # BLD AUTO: 0.06 THOUSAND/UL (ref 0–0.2)
IMM GRANULOCYTES NFR BLD AUTO: 1 % (ref 0–2)
IRON SATN MFR SERPL: 22 %
IRON SERPL-MCNC: 67 UG/DL (ref 65–175)
LYMPHOCYTES # BLD AUTO: 1.16 THOUSANDS/ΜL (ref 0.6–4.47)
LYMPHOCYTES NFR BLD AUTO: 10 % (ref 14–44)
MCH RBC QN AUTO: 30.9 PG (ref 26.8–34.3)
MCHC RBC AUTO-ENTMCNC: 32.3 G/DL (ref 31.4–37.4)
MCV RBC AUTO: 96 FL (ref 82–98)
MICROALBUMIN UR-MCNC: 25.6 MG/L (ref 0–20)
MICROALBUMIN/CREAT 24H UR: 147 MG/G CREATININE (ref 0–30)
MONOCYTES # BLD AUTO: 1.11 THOUSAND/ΜL (ref 0.17–1.22)
MONOCYTES NFR BLD AUTO: 10 % (ref 4–12)
NEUTROPHILS # BLD AUTO: 8.94 THOUSANDS/ΜL (ref 1.85–7.62)
NEUTS SEG NFR BLD AUTO: 75 % (ref 43–75)
NRBC BLD AUTO-RTO: 0 /100 WBCS
PLATELET # BLD AUTO: 179 THOUSANDS/UL (ref 149–390)
PMV BLD AUTO: 13.1 FL (ref 8.9–12.7)
POTASSIUM SERPL-SCNC: 4.6 MMOL/L (ref 3.5–5.3)
PROT SERPL-MCNC: 7.6 G/DL (ref 6.4–8.2)
RBC # BLD AUTO: 4.66 MILLION/UL (ref 3.88–5.62)
SODIUM SERPL-SCNC: 138 MMOL/L (ref 136–145)
TIBC SERPL-MCNC: 301 UG/DL (ref 250–450)
TSH SERPL DL<=0.05 MIU/L-ACNC: 1.83 UIU/ML (ref 0.36–3.74)
WBC # BLD AUTO: 11.67 THOUSAND/UL (ref 4.31–10.16)

## 2020-08-04 PROCEDURE — 86704 HEP B CORE ANTIBODY TOTAL: CPT

## 2020-08-04 PROCEDURE — 36415 COLL VENOUS BLD VENIPUNCTURE: CPT

## 2020-08-04 PROCEDURE — 84443 ASSAY THYROID STIM HORMONE: CPT

## 2020-08-04 PROCEDURE — 85025 COMPLETE CBC W/AUTO DIFF WBC: CPT

## 2020-08-04 PROCEDURE — 86235 NUCLEAR ANTIGEN ANTIBODY: CPT

## 2020-08-04 PROCEDURE — 82728 ASSAY OF FERRITIN: CPT

## 2020-08-04 PROCEDURE — 82570 ASSAY OF URINE CREATININE: CPT | Performed by: INTERNAL MEDICINE

## 2020-08-04 PROCEDURE — 80053 COMPREHEN METABOLIC PANEL: CPT

## 2020-08-04 PROCEDURE — 80074 ACUTE HEPATITIS PANEL: CPT

## 2020-08-04 PROCEDURE — 86038 ANTINUCLEAR ANTIBODIES: CPT

## 2020-08-04 PROCEDURE — 86256 FLUORESCENT ANTIBODY TITER: CPT

## 2020-08-04 PROCEDURE — 83540 ASSAY OF IRON: CPT

## 2020-08-04 PROCEDURE — 83550 IRON BINDING TEST: CPT

## 2020-08-04 PROCEDURE — 82043 UR ALBUMIN QUANTITATIVE: CPT | Performed by: INTERNAL MEDICINE

## 2020-08-05 LAB
ACTIN IGG SERPL-ACNC: 7 UNITS (ref 0–19)
MITOCHONDRIA M2 IGG SER-ACNC: <20 UNITS (ref 0–20)
RYE IGE QN: NEGATIVE

## 2020-08-11 ENCOUNTER — APPOINTMENT (OUTPATIENT)
Dept: LAB | Facility: CLINIC | Age: 80
End: 2020-08-11
Payer: MEDICARE

## 2020-08-11 DIAGNOSIS — C67.2 MALIGNANT NEOPLASM OF LATERAL WALL OF URINARY BLADDER (HCC): ICD-10-CM

## 2020-08-11 LAB
ALBUMIN SERPL BCP-MCNC: 3.4 G/DL (ref 3.5–5)
ALP SERPL-CCNC: 242 U/L (ref 46–116)
ALT SERPL W P-5'-P-CCNC: 60 U/L (ref 12–78)
ANION GAP SERPL CALCULATED.3IONS-SCNC: 5 MMOL/L (ref 4–13)
AST SERPL W P-5'-P-CCNC: 27 U/L (ref 5–45)
BASOPHILS # BLD AUTO: 0.07 THOUSANDS/ΜL (ref 0–0.1)
BASOPHILS NFR BLD AUTO: 1 % (ref 0–1)
BILIRUB SERPL-MCNC: 0.68 MG/DL (ref 0.2–1)
BUN SERPL-MCNC: 26 MG/DL (ref 5–25)
CALCIUM SERPL-MCNC: 9.6 MG/DL (ref 8.3–10.1)
CHLORIDE SERPL-SCNC: 107 MMOL/L (ref 100–108)
CO2 SERPL-SCNC: 25 MMOL/L (ref 21–32)
CREAT SERPL-MCNC: 1.57 MG/DL (ref 0.6–1.3)
EOSINOPHIL # BLD AUTO: 0.27 THOUSAND/ΜL (ref 0–0.61)
EOSINOPHIL NFR BLD AUTO: 2 % (ref 0–6)
ERYTHROCYTE [DISTWIDTH] IN BLOOD BY AUTOMATED COUNT: 13.9 % (ref 11.6–15.1)
GFR SERPL CREATININE-BSD FRML MDRD: 41 ML/MIN/1.73SQ M
GLUCOSE P FAST SERPL-MCNC: 152 MG/DL (ref 65–99)
HCT VFR BLD AUTO: 42.5 % (ref 36.5–49.3)
HGB BLD-MCNC: 14.4 G/DL (ref 12–17)
IMM GRANULOCYTES # BLD AUTO: 0.11 THOUSAND/UL (ref 0–0.2)
IMM GRANULOCYTES NFR BLD AUTO: 1 % (ref 0–2)
LYMPHOCYTES # BLD AUTO: 1.47 THOUSANDS/ΜL (ref 0.6–4.47)
LYMPHOCYTES NFR BLD AUTO: 13 % (ref 14–44)
MCH RBC QN AUTO: 31.3 PG (ref 26.8–34.3)
MCHC RBC AUTO-ENTMCNC: 33.9 G/DL (ref 31.4–37.4)
MCV RBC AUTO: 92 FL (ref 82–98)
MONOCYTES # BLD AUTO: 1.34 THOUSAND/ΜL (ref 0.17–1.22)
MONOCYTES NFR BLD AUTO: 12 % (ref 4–12)
NEUTROPHILS # BLD AUTO: 8.44 THOUSANDS/ΜL (ref 1.85–7.62)
NEUTS SEG NFR BLD AUTO: 71 % (ref 43–75)
NRBC BLD AUTO-RTO: 0 /100 WBCS
PLATELET # BLD AUTO: 187 THOUSANDS/UL (ref 149–390)
PMV BLD AUTO: 12.5 FL (ref 8.9–12.7)
POTASSIUM SERPL-SCNC: 4.4 MMOL/L (ref 3.5–5.3)
PROT SERPL-MCNC: 7.8 G/DL (ref 6.4–8.2)
RBC # BLD AUTO: 4.6 MILLION/UL (ref 3.88–5.62)
SODIUM SERPL-SCNC: 137 MMOL/L (ref 136–145)
TSH SERPL DL<=0.05 MIU/L-ACNC: 1.75 UIU/ML (ref 0.36–3.74)
WBC # BLD AUTO: 11.7 THOUSAND/UL (ref 4.31–10.16)

## 2020-08-11 PROCEDURE — 84443 ASSAY THYROID STIM HORMONE: CPT

## 2020-08-11 PROCEDURE — 80053 COMPREHEN METABOLIC PANEL: CPT

## 2020-08-11 PROCEDURE — 85025 COMPLETE CBC W/AUTO DIFF WBC: CPT

## 2020-08-11 PROCEDURE — 36415 COLL VENOUS BLD VENIPUNCTURE: CPT

## 2020-08-13 ENCOUNTER — OFFICE VISIT (OUTPATIENT)
Dept: UROLOGY | Facility: MEDICAL CENTER | Age: 80
End: 2020-08-13
Payer: MEDICARE

## 2020-08-13 VITALS
SYSTOLIC BLOOD PRESSURE: 142 MMHG | HEIGHT: 68 IN | BODY MASS INDEX: 27.89 KG/M2 | WEIGHT: 184 LBS | DIASTOLIC BLOOD PRESSURE: 78 MMHG

## 2020-08-13 DIAGNOSIS — C67.2 MALIGNANT NEOPLASM OF LATERAL WALL OF URINARY BLADDER (HCC): Primary | ICD-10-CM

## 2020-08-13 PROCEDURE — 3008F BODY MASS INDEX DOCD: CPT | Performed by: UROLOGY

## 2020-08-13 PROCEDURE — 3066F NEPHROPATHY DOC TX: CPT | Performed by: UROLOGY

## 2020-08-13 PROCEDURE — 1160F RVW MEDS BY RX/DR IN RCRD: CPT | Performed by: UROLOGY

## 2020-08-13 PROCEDURE — 3078F DIAST BP <80 MM HG: CPT | Performed by: UROLOGY

## 2020-08-13 PROCEDURE — 81003 URINALYSIS AUTO W/O SCOPE: CPT | Performed by: UROLOGY

## 2020-08-13 PROCEDURE — 3077F SYST BP >= 140 MM HG: CPT | Performed by: UROLOGY

## 2020-08-13 PROCEDURE — 99215 OFFICE O/P EST HI 40 MIN: CPT | Performed by: UROLOGY

## 2020-08-13 PROCEDURE — 3044F HG A1C LEVEL LT 7.0%: CPT | Performed by: UROLOGY

## 2020-08-13 PROCEDURE — 1036F TOBACCO NON-USER: CPT | Performed by: UROLOGY

## 2020-08-13 PROCEDURE — 2026F EYE IMG VALID EVC RTNOPTHY: CPT | Performed by: UROLOGY

## 2020-08-13 PROCEDURE — 4040F PNEUMOC VAC/ADMIN/RCVD: CPT | Performed by: UROLOGY

## 2020-08-13 PROCEDURE — 3060F POS MICROALBUMINURIA REV: CPT | Performed by: UROLOGY

## 2020-08-13 NOTE — H&P
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)  Cystoscopy and bladder biopsies scheduled  Diagnoses and all orders for this visit:    Malignant neoplasm of lateral wall of urinary bladder (HCC)  -     Cancel: Cytology, urine; Future  -     Cytology, urine; Future          Subjective:      Patient ID: Marixa Powers is a [de-identified] y o  male  HPI  Bladder Cancer: The patient returns today to plan repeat bladder biopsies due to his history of high-grade bladder cancer and carcinoma in situ  The patient has been presented at tumor Board and we are following the recommendations  No changes in voiding habits since last visit  No visible blood inhis urine  Abnormal liver function:  The patient's liver function tests were markedly abnormal after starting Keytruda  The patient was also investigated for possible BCG involvement of his liver  At that point, the Mara Fantasma was discontinued in his liver functions are returning to normal   He has no future appointments with gastroenterology  We still should have clearance before he has an anesthetic for the procedure  I told the patient we would probably get him on the schedule in about a month  The patient is accompanied by his wife  The following portions of the patient's history were reviewed and updated as appropriate: allergies, current medications, past family history, past medical history, past social history, past surgical history and problem list     Review of Systems    Constitutional: Negative for activity change and fatigue  Respiratory: Negative for shortness of breath and wheezing     Cardiovascular: Negative for chest pain         Hypertension    Gastrointestinal: Negative for abdominal pain     Endocrine:        Non-insulin dependent diabetes   Currently not taking metformin until his liver function tests returned to normal   Genitourinary: Negative for difficulty urinating, dysuria, frequency, hematuria and urgency  Musculoskeletal: Negative for back pain and gait problem  Skin: Negative     Allergic/Immunologic: Negative     Neurological: Negative     Psychiatric/Behavioral: Negative  Objective:      /78 (BP Location: Left arm, Patient Position: Sitting, Cuff Size: Adult)   Ht 5' 8" (1 727 m)   Wt 83 5 kg (184 lb)   BMI 27 98 kg/m²          Physical Exam  Constitutional:       Appearance: He is well-developed  HENT:      Head: Normocephalic and atraumatic  Neck:      Musculoskeletal: Normal range of motion and neck supple  Cardiovascular:      Rate and Rhythm: Normal rate and regular rhythm  Heart sounds: Normal heart sounds  Pulmonary:      Effort: Pulmonary effort is normal       Breath sounds: Normal breath sounds  Abdominal:      Palpations: Abdomen is soft  Tenderness: There is no abdominal tenderness  Musculoskeletal: Normal range of motion  Skin:     General: Skin is warm and dry  Neurological:      Mental Status: He is alert and oriented to person, place, and time  Psychiatric:         Behavior: Behavior normal          Thought Content:  Thought content normal          Judgment: Judgment normal

## 2020-08-13 NOTE — PROGRESS NOTES
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)  Cystoscopy and bladder biopsies scheduled  Diagnoses and all orders for this visit:    Malignant neoplasm of lateral wall of urinary bladder (HCC)  -     Cancel: Cytology, urine; Future  -     Cytology, urine; Future          Subjective:      Patient ID: Carson Diaz is a [de-identified] y o  male  HPI  Bladder Cancer: The patient returns today to plan repeat bladder biopsies due to his history of high-grade bladder cancer and carcinoma in situ  The patient has been presented at tumor Board and we are following the recommendations  No changes in voiding habits since last visit  No visible blood inhis urine  Abnormal liver function:  The patient's liver function tests were markedly abnormal after starting Keytruda  The patient was also investigated for possible BCG involvement of his liver  At that point, the Clementeen Helms was discontinued in his liver functions are returning to normal   He has no future appointments with gastroenterology  We still should have clearance before he has an anesthetic for the procedure  I told the patient we would probably get him on the schedule in about a month  The patient is accompanied by his wife  The following portions of the patient's history were reviewed and updated as appropriate: allergies, current medications, past family history, past medical history, past social history, past surgical history and problem list     Review of Systems    Constitutional: Negative for activity change and fatigue  Respiratory: Negative for shortness of breath and wheezing     Cardiovascular: Negative for chest pain         Hypertension    Gastrointestinal: Negative for abdominal pain     Endocrine:        Non-insulin dependent diabetes   Currently not taking metformin until his liver function tests returned to normal   Genitourinary: Negative for difficulty urinating, dysuria, frequency, hematuria and urgency  Musculoskeletal: Negative for back pain and gait problem  Skin: Negative     Allergic/Immunologic: Negative     Neurological: Negative     Psychiatric/Behavioral: Negative  Objective:      /78 (BP Location: Left arm, Patient Position: Sitting, Cuff Size: Adult)   Ht 5' 8" (1 727 m)   Wt 83 5 kg (184 lb)   BMI 27 98 kg/m²          Physical Exam  Constitutional:       Appearance: He is well-developed  HENT:      Head: Normocephalic and atraumatic  Neck:      Musculoskeletal: Normal range of motion and neck supple  Cardiovascular:      Rate and Rhythm: Normal rate and regular rhythm  Heart sounds: Normal heart sounds  Pulmonary:      Effort: Pulmonary effort is normal       Breath sounds: Normal breath sounds  Abdominal:      Palpations: Abdomen is soft  Tenderness: There is no abdominal tenderness  Musculoskeletal: Normal range of motion  Skin:     General: Skin is warm and dry  Neurological:      Mental Status: He is alert and oriented to person, place, and time  Psychiatric:         Behavior: Behavior normal          Thought Content:  Thought content normal          Judgment: Judgment normal

## 2020-08-13 NOTE — H&P (VIEW-ONLY)
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)  Cystoscopy and bladder biopsies scheduled  Diagnoses and all orders for this visit:    Malignant neoplasm of lateral wall of urinary bladder (HCC)  -     Cancel: Cytology, urine; Future  -     Cytology, urine; Future          Subjective:      Patient ID: Jacy Wynn is a [de-identified] y o  male  HPI  Bladder Cancer: The patient returns today to plan repeat bladder biopsies due to his history of high-grade bladder cancer and carcinoma in situ  The patient has been presented at tumor Board and we are following the recommendations  No changes in voiding habits since last visit  No visible blood inhis urine  Abnormal liver function:  The patient's liver function tests were markedly abnormal after starting Keytruda  The patient was also investigated for possible BCG involvement of his liver  At that point, the [place] was discontinued in his liver functions are returning to normal   He has no future appointments with gastroenterology  We still should have clearance before he has an anesthetic for the procedure  I told the patient we would probably get him on the schedule in about a month  The patient is accompanied by his wife  The following portions of the patient's history were reviewed and updated as appropriate: allergies, current medications, past family history, past medical history, past social history, past surgical history and problem list     Review of Systems    Constitutional: Negative for activity change and fatigue  Respiratory: Negative for shortness of breath and wheezing     Cardiovascular: Negative for chest pain         Hypertension    Gastrointestinal: Negative for abdominal pain     Endocrine:        Non-insulin dependent diabetes   Currently not taking metformin until his liver function tests returned to normal   Genitourinary: Negative for difficulty urinating, dysuria, frequency, hematuria and urgency  Musculoskeletal: Negative for back pain and gait problem  Skin: Negative     Allergic/Immunologic: Negative     Neurological: Negative     Psychiatric/Behavioral: Negative  Objective:      /78 (BP Location: Left arm, Patient Position: Sitting, Cuff Size: Adult)   Ht 5' 8" (1 727 m)   Wt 83 5 kg (184 lb)   BMI 27 98 kg/m²          Physical Exam  Constitutional:       Appearance: He is well-developed  HENT:      Head: Normocephalic and atraumatic  Neck:      Musculoskeletal: Normal range of motion and neck supple  Cardiovascular:      Rate and Rhythm: Normal rate and regular rhythm  Heart sounds: Normal heart sounds  Pulmonary:      Effort: Pulmonary effort is normal       Breath sounds: Normal breath sounds  Abdominal:      Palpations: Abdomen is soft  Tenderness: There is no abdominal tenderness  Musculoskeletal: Normal range of motion  Skin:     General: Skin is warm and dry  Neurological:      Mental Status: He is alert and oriented to person, place, and time  Psychiatric:         Behavior: Behavior normal          Thought Content:  Thought content normal          Judgment: Judgment normal

## 2020-08-14 ENCOUNTER — TELEPHONE (OUTPATIENT)
Dept: UROLOGY | Facility: MEDICAL CENTER | Age: 80
End: 2020-08-14

## 2020-08-14 ENCOUNTER — APPOINTMENT (OUTPATIENT)
Dept: LAB | Facility: CLINIC | Age: 80
End: 2020-08-14
Payer: MEDICARE

## 2020-08-14 DIAGNOSIS — C67.2 MALIGNANT NEOPLASM OF LATERAL WALL OF URINARY BLADDER (HCC): ICD-10-CM

## 2020-08-14 PROCEDURE — 88112 CYTOPATH CELL ENHANCE TECH: CPT | Performed by: PATHOLOGY

## 2020-08-14 NOTE — TELEPHONE ENCOUNTER
I left a voice mail message for the patient to schedule his Cysto, BBX ordered by Dr Davidson Led   Suggested 9/3/2020 [facility] with Dr Naya Ocasoi

## 2020-08-17 ENCOUNTER — APPOINTMENT (OUTPATIENT)
Dept: LAB | Facility: CLINIC | Age: 80
End: 2020-08-17
Payer: MEDICARE

## 2020-08-17 ENCOUNTER — TELEPHONE (OUTPATIENT)
Dept: INFECTIOUS DISEASES | Facility: CLINIC | Age: 80
End: 2020-08-17

## 2020-08-17 DIAGNOSIS — C67.2 MALIGNANT NEOPLASM OF LATERAL WALL OF URINARY BLADDER (HCC): ICD-10-CM

## 2020-08-17 LAB
ALBUMIN SERPL BCP-MCNC: 3.8 G/DL (ref 3.5–5)
ALP SERPL-CCNC: 171 U/L (ref 46–116)
ALT SERPL W P-5'-P-CCNC: 41 U/L (ref 12–78)
ANION GAP SERPL CALCULATED.3IONS-SCNC: 7 MMOL/L (ref 4–13)
AST SERPL W P-5'-P-CCNC: 22 U/L (ref 5–45)
BASOPHILS # BLD AUTO: 0.09 THOUSANDS/ΜL (ref 0–0.1)
BASOPHILS NFR BLD AUTO: 1 % (ref 0–1)
BILIRUB SERPL-MCNC: 0.91 MG/DL (ref 0.2–1)
BUN SERPL-MCNC: 21 MG/DL (ref 5–25)
CALCIUM SERPL-MCNC: 9.9 MG/DL (ref 8.3–10.1)
CHLORIDE SERPL-SCNC: 105 MMOL/L (ref 100–108)
CO2 SERPL-SCNC: 26 MMOL/L (ref 21–32)
CREAT SERPL-MCNC: 1.49 MG/DL (ref 0.6–1.3)
EOSINOPHIL # BLD AUTO: 0.22 THOUSAND/ΜL (ref 0–0.61)
EOSINOPHIL NFR BLD AUTO: 2 % (ref 0–6)
ERYTHROCYTE [DISTWIDTH] IN BLOOD BY AUTOMATED COUNT: 14 % (ref 11.6–15.1)
GFR SERPL CREATININE-BSD FRML MDRD: 44 ML/MIN/1.73SQ M
GLUCOSE SERPL-MCNC: 144 MG/DL (ref 65–140)
HCT VFR BLD AUTO: 41.9 % (ref 36.5–49.3)
HGB BLD-MCNC: 14.2 G/DL (ref 12–17)
IMM GRANULOCYTES # BLD AUTO: 0.07 THOUSAND/UL (ref 0–0.2)
IMM GRANULOCYTES NFR BLD AUTO: 1 % (ref 0–2)
LYMPHOCYTES # BLD AUTO: 1.35 THOUSANDS/ΜL (ref 0.6–4.47)
LYMPHOCYTES NFR BLD AUTO: 10 % (ref 14–44)
MCH RBC QN AUTO: 31.6 PG (ref 26.8–34.3)
MCHC RBC AUTO-ENTMCNC: 33.9 G/DL (ref 31.4–37.4)
MCV RBC AUTO: 93 FL (ref 82–98)
MONOCYTES # BLD AUTO: 1.19 THOUSAND/ΜL (ref 0.17–1.22)
MONOCYTES NFR BLD AUTO: 9 % (ref 4–12)
NEUTROPHILS # BLD AUTO: 10.41 THOUSANDS/ΜL (ref 1.85–7.62)
NEUTS SEG NFR BLD AUTO: 77 % (ref 43–75)
NRBC BLD AUTO-RTO: 0 /100 WBCS
PLATELET # BLD AUTO: 185 THOUSANDS/UL (ref 149–390)
PMV BLD AUTO: 12.7 FL (ref 8.9–12.7)
POTASSIUM SERPL-SCNC: 4.4 MMOL/L (ref 3.5–5.3)
PROT SERPL-MCNC: 7.8 G/DL (ref 6.4–8.2)
RBC # BLD AUTO: 4.5 MILLION/UL (ref 3.88–5.62)
SODIUM SERPL-SCNC: 138 MMOL/L (ref 136–145)
TSH SERPL DL<=0.05 MIU/L-ACNC: 1.55 UIU/ML (ref 0.36–3.74)
WBC # BLD AUTO: 13.33 THOUSAND/UL (ref 4.31–10.16)

## 2020-08-17 PROCEDURE — 36415 COLL VENOUS BLD VENIPUNCTURE: CPT

## 2020-08-17 PROCEDURE — 80053 COMPREHEN METABOLIC PANEL: CPT

## 2020-08-17 PROCEDURE — 84443 ASSAY THYROID STIM HORMONE: CPT

## 2020-08-17 PROCEDURE — 85025 COMPLETE CBC W/AUTO DIFF WBC: CPT

## 2020-08-17 NOTE — TELEPHONE ENCOUNTER
COVID Pre-Visit Screening     1  Is this a family member screening? yes  2  Have you traveled outside of your state in the past 2 weeks? no  3  Do you presently have a fever or flu-like symptoms? no  4  Do you have symptoms of an upper respiratory infection like runny nose, sore throat, or cough? no  5  Are you suffering from new headache that you have not had in the past?  no  6  Do you have/have you experienced any new shortness of breath recently? no  7  Do you have any new diarrhea, nausea or vomiting?no  8  Have you been in contact with anyone who has been sick or diagnosed with COVID-19? no  9  Do you have any new loss of taste or smell? no  10  Are you able to wear a mask without a valve for the entire visit?  yes

## 2020-08-18 ENCOUNTER — OFFICE VISIT (OUTPATIENT)
Dept: INFECTIOUS DISEASES | Facility: CLINIC | Age: 80
End: 2020-08-18
Payer: MEDICARE

## 2020-08-18 ENCOUNTER — TELEPHONE (OUTPATIENT)
Dept: UROLOGY | Facility: MEDICAL CENTER | Age: 80
End: 2020-08-18

## 2020-08-18 VITALS
BODY MASS INDEX: 28.83 KG/M2 | WEIGHT: 189.6 LBS | DIASTOLIC BLOOD PRESSURE: 68 MMHG | TEMPERATURE: 98.1 F | HEART RATE: 54 BPM | SYSTOLIC BLOOD PRESSURE: 140 MMHG

## 2020-08-18 DIAGNOSIS — C67.2 MALIGNANT NEOPLASM OF LATERAL WALL OF URINARY BLADDER (HCC): ICD-10-CM

## 2020-08-18 DIAGNOSIS — E11.59 TYPE 2 DIABETES MELLITUS WITH OTHER CIRCULATORY COMPLICATION, WITHOUT LONG-TERM CURRENT USE OF INSULIN (HCC): Primary | ICD-10-CM

## 2020-08-18 DIAGNOSIS — R74.01 TRANSAMINITIS: ICD-10-CM

## 2020-08-18 PROCEDURE — 3078F DIAST BP <80 MM HG: CPT | Performed by: INTERNAL MEDICINE

## 2020-08-18 PROCEDURE — 3060F POS MICROALBUMINURIA REV: CPT | Performed by: INTERNAL MEDICINE

## 2020-08-18 PROCEDURE — 3077F SYST BP >= 140 MM HG: CPT | Performed by: INTERNAL MEDICINE

## 2020-08-18 PROCEDURE — 1036F TOBACCO NON-USER: CPT | Performed by: INTERNAL MEDICINE

## 2020-08-18 PROCEDURE — 2026F EYE IMG VALID EVC RTNOPTHY: CPT | Performed by: INTERNAL MEDICINE

## 2020-08-18 PROCEDURE — 3066F NEPHROPATHY DOC TX: CPT | Performed by: INTERNAL MEDICINE

## 2020-08-18 PROCEDURE — 1160F RVW MEDS BY RX/DR IN RCRD: CPT | Performed by: INTERNAL MEDICINE

## 2020-08-18 PROCEDURE — 4040F PNEUMOC VAC/ADMIN/RCVD: CPT | Performed by: INTERNAL MEDICINE

## 2020-08-18 PROCEDURE — 99213 OFFICE O/P EST LOW 20 MIN: CPT | Performed by: INTERNAL MEDICINE

## 2020-08-18 PROCEDURE — 3044F HG A1C LEVEL LT 7.0%: CPT | Performed by: INTERNAL MEDICINE

## 2020-08-18 NOTE — PROGRESS NOTES
Progress Note - Infectious Disease   Sheryl Han [de-identified] y o  male MRN: [id number]   Encounter: [id number]    Impression:  · Transaminitis: likely due to medications and/or polypharmacy  I suspect the patient's transaminitis was due to   The pt's LFTs were WNL following intravesical BCG therapy in Feb 2020  However, transaminitis was noted and most prominent shortly after his second dose of Keytruda  Acute hepatitis panel was negative on 7/2 and 8/4  Abd US on 6/25 demonstrated gallstones but no ductal dilatation  His AST/ALT have since normalized  · I do not suspect disseminated BCG  TB QuantiFERON gold and AFB blood cultures are negative  · Polypharmacy could also be contributing to transaminitis as patient was taking simvastatin and metformin  · Bladder cancer: Pt completed his course of intravescial BCG in Feb 2020  · Pt received Keytruda on May 26th and June 16th  Additional doses of Keytruda have been held  · Gallstones: with intermittent RLQ abd pain  Pt has no RUQ abd pain  Sonographic Short's sign was not elicited during his prior abdominal ultrasound in June  Abdominal ultrasound did not demonstrate biliary duct dilatation  I do not suspect acute cholecystitis  · Hyperlipidemia:  Simvastatin has been held  · Diabetes mellitus type 2:  On metformin      RECOMMENDATIONS:   · Avoid hepatotoxins such as acetaminophen and alcohol  · No need for additional LFT monitoring at this time  · Discharge from ID service  Pt can follow-up prn  My recommendations were discussed with the patient in detail who verbalized understanding  Thank you for allowing me to participate in the care of this patient  Antibiotics: none    Subjective:  [de-identified]y o  year old man here for followup evaluation of transaminitis and recent history of BCG therapy  Pt says he well today  He says he has lost wt of 12 lb over the past 2 months  He reports ongoing fatigue  His appetite remains fair   He eats about half of what he normally eats  He denies fever  He has occasional night sweats  He denies nausea, vomiting, diarrhea  Pt stopped drinking alcohol on July 7th after his visit with Dr Alecia Ga  Objective:  Vitals:  Vitals:    08/18/20 1028   BP: 140/68   Pulse: (!) 54   Temp: 98 1 °F (36 7 °C)   Weight: 86 kg (189 lb 9 6 oz)     Physical Exam:   General Appearance:  Alert, interactive, nontoxic, no acute distress  Throat: Deferred due to COVID pandemic   Lungs:   Clear to auscultation bilaterally; no wheezes, respirations unlabored   Heart:  S1, S2, RRR; no murmur   Abdomen:   Soft, non-tender, non-distended   Extremities: Trace distal left leg edema   Neurologic: AAO x3, conversant, fluent speech   Skin: No rash  Labs, Imaging, & Other studies:   All pertinent labs and imaging studies were personally reviewed  Results from last 7 days   Lab Units 08/17/20  1230 08/11/20  1230   WBC Thousand/uL 13 33* 11 70*   HEMOGLOBIN g/dL 14 2 14 4   PLATELETS Thousands/uL 185 187     Results from last 7 days   Lab Units 08/17/20  1230 08/11/20  1230   SODIUM mmol/L 138 137   POTASSIUM mmol/L 4 4 4 4   CHLORIDE mmol/L 105 107   CO2 mmol/L 26 25   BUN mg/dL 21 26*   CREATININE mg/dL 1 49* 1 57*   EGFR ml/min/1 73sq m 44 41   CALCIUM mg/dL 9 9 9 6   AST U/L 22 27   ALT U/L 41 60   ALK PHOS U/L 171* 242*     7/27/20 QFN TB gold: neg  7/27/20 AFB blood cx: neg to date    7/16 RUQ US Liver:   1  Now seen is a small mildly complex cyst at the left lobe of the liver laterally  This is not seen on prior ultrasound but likely present on CT of 11/15/2018  Liver is otherwise unremarkable  2   Cholelithiasis

## 2020-08-18 NOTE — TELEPHONE ENCOUNTER
----- Message from Chai Tipton MD sent at 8/18/2020  2:05 PM EDT -----  Repeat urine cytology is positive for malignant cells  Dr Atul Rasheed will biopsy the patient's bladder in a few weeks  Please inform the patient that he likely has recurrent tumor and that Dr Atul Rasheed will get a better look in a few weeks  Thank you

## 2020-08-18 NOTE — TELEPHONE ENCOUNTER
Called patient - LMOM that the cytology came back abnormal   He is scheduled with Dr Kee Guevara for cystoscopy and bladder biopsies in a few weeks

## 2020-08-31 ENCOUNTER — TELEPHONE (OUTPATIENT)
Dept: UROLOGY | Facility: MEDICAL CENTER | Age: 80
End: 2020-08-31

## 2020-09-01 ENCOUNTER — LAB (OUTPATIENT)
Dept: LAB | Facility: HOSPITAL | Age: 80
End: 2020-09-01
Attending: UROLOGY
Payer: MEDICARE

## 2020-09-01 DIAGNOSIS — Z01.812 PRE-OPERATIVE LABORATORY EXAMINATION: ICD-10-CM

## 2020-09-01 DIAGNOSIS — R39.89 SUSPECTED UTI: ICD-10-CM

## 2020-09-01 DIAGNOSIS — C67.2 MALIGNANT NEOPLASM OF LATERAL WALL OF URINARY BLADDER (HCC): ICD-10-CM

## 2020-09-01 LAB
ALBUMIN SERPL BCP-MCNC: 3.4 G/DL (ref 3.5–5)
ALP SERPL-CCNC: 103 U/L (ref 46–116)
ALT SERPL W P-5'-P-CCNC: 26 U/L (ref 12–78)
ANION GAP SERPL CALCULATED.3IONS-SCNC: 5 MMOL/L (ref 4–13)
AST SERPL W P-5'-P-CCNC: 16 U/L (ref 5–45)
BASOPHILS # BLD AUTO: 0.06 THOUSANDS/ΜL (ref 0–0.1)
BASOPHILS NFR BLD AUTO: 1 % (ref 0–1)
BILIRUB SERPL-MCNC: 0.55 MG/DL (ref 0.2–1)
BUN SERPL-MCNC: 25 MG/DL (ref 5–25)
CALCIUM SERPL-MCNC: 9.4 MG/DL (ref 8.3–10.1)
CHLORIDE SERPL-SCNC: 108 MMOL/L (ref 100–108)
CO2 SERPL-SCNC: 26 MMOL/L (ref 21–32)
CREAT SERPL-MCNC: 1.48 MG/DL (ref 0.6–1.3)
EOSINOPHIL # BLD AUTO: 0.25 THOUSAND/ΜL (ref 0–0.61)
EOSINOPHIL NFR BLD AUTO: 2 % (ref 0–6)
ERYTHROCYTE [DISTWIDTH] IN BLOOD BY AUTOMATED COUNT: 14.2 % (ref 11.6–15.1)
GFR SERPL CREATININE-BSD FRML MDRD: 44 ML/MIN/1.73SQ M
GLUCOSE P FAST SERPL-MCNC: 156 MG/DL (ref 65–99)
HCT VFR BLD AUTO: 43.4 % (ref 36.5–49.3)
HGB BLD-MCNC: 14.5 G/DL (ref 12–17)
IMM GRANULOCYTES # BLD AUTO: 0.07 THOUSAND/UL (ref 0–0.2)
IMM GRANULOCYTES NFR BLD AUTO: 1 % (ref 0–2)
LYMPHOCYTES # BLD AUTO: 1.11 THOUSANDS/ΜL (ref 0.6–4.47)
LYMPHOCYTES NFR BLD AUTO: 10 % (ref 14–44)
MCH RBC QN AUTO: 31.3 PG (ref 26.8–34.3)
MCHC RBC AUTO-ENTMCNC: 33.4 G/DL (ref 31.4–37.4)
MCV RBC AUTO: 94 FL (ref 82–98)
MONOCYTES # BLD AUTO: 0.96 THOUSAND/ΜL (ref 0.17–1.22)
MONOCYTES NFR BLD AUTO: 8 % (ref 4–12)
NEUTROPHILS # BLD AUTO: 9.15 THOUSANDS/ΜL (ref 1.85–7.62)
NEUTS SEG NFR BLD AUTO: 78 % (ref 43–75)
NRBC BLD AUTO-RTO: 0 /100 WBCS
PLATELET # BLD AUTO: 188 THOUSANDS/UL (ref 149–390)
PMV BLD AUTO: 12.2 FL (ref 8.9–12.7)
POTASSIUM SERPL-SCNC: 4 MMOL/L (ref 3.5–5.3)
PROT SERPL-MCNC: 7.5 G/DL (ref 6.4–8.2)
RBC # BLD AUTO: 4.64 MILLION/UL (ref 3.88–5.62)
SODIUM SERPL-SCNC: 139 MMOL/L (ref 136–145)
WBC # BLD AUTO: 11.6 THOUSAND/UL (ref 4.31–10.16)

## 2020-09-01 PROCEDURE — 93005 ELECTROCARDIOGRAM TRACING: CPT

## 2020-09-01 PROCEDURE — 85025 COMPLETE CBC W/AUTO DIFF WBC: CPT

## 2020-09-01 PROCEDURE — 87086 URINE CULTURE/COLONY COUNT: CPT

## 2020-09-01 PROCEDURE — 36415 COLL VENOUS BLD VENIPUNCTURE: CPT

## 2020-09-01 PROCEDURE — 80053 COMPREHEN METABOLIC PANEL: CPT

## 2020-09-02 LAB
ATRIAL RATE: 61 BPM
BACTERIA UR CULT: NORMAL
P AXIS: 79 DEGREES
PR INTERVAL: 228 MS
QRS AXIS: -54 DEGREES
QRSD INTERVAL: 140 MS
QT INTERVAL: 440 MS
QTC INTERVAL: 442 MS
T WAVE AXIS: 67 DEGREES
VENTRICULAR RATE: 61 BPM

## 2020-09-02 PROCEDURE — 93010 ELECTROCARDIOGRAM REPORT: CPT | Performed by: INTERNAL MEDICINE

## 2020-09-03 ENCOUNTER — OFFICE VISIT (OUTPATIENT)
Dept: INTERNAL MEDICINE CLINIC | Facility: CLINIC | Age: 80
End: 2020-09-03
Payer: MEDICARE

## 2020-09-03 VITALS
HEART RATE: 59 BPM | BODY MASS INDEX: 28.49 KG/M2 | HEIGHT: 68 IN | WEIGHT: 188 LBS | TEMPERATURE: 98 F | OXYGEN SATURATION: 98 % | SYSTOLIC BLOOD PRESSURE: 148 MMHG | DIASTOLIC BLOOD PRESSURE: 80 MMHG

## 2020-09-03 DIAGNOSIS — E11.3293 TYPE 2 DIABETES MELLITUS WITH BOTH EYES AFFECTED BY MILD NONPROLIFERATIVE RETINOPATHY WITHOUT MACULAR EDEMA, WITHOUT LONG-TERM CURRENT USE OF INSULIN (HCC): ICD-10-CM

## 2020-09-03 DIAGNOSIS — R22.32 FOREARM MASS, LEFT: Primary | ICD-10-CM

## 2020-09-03 DIAGNOSIS — E78.2 MIXED HYPERLIPIDEMIA: ICD-10-CM

## 2020-09-03 DIAGNOSIS — I10 BENIGN ESSENTIAL HYPERTENSION: ICD-10-CM

## 2020-09-03 DIAGNOSIS — Z23 ENCOUNTER FOR IMMUNIZATION: ICD-10-CM

## 2020-09-03 DIAGNOSIS — D49.4 BLADDER TUMOR: ICD-10-CM

## 2020-09-03 PROCEDURE — 90662 IIV NO PRSV INCREASED AG IM: CPT

## 2020-09-03 PROCEDURE — G0008 ADMIN INFLUENZA VIRUS VAC: HCPCS

## 2020-09-03 PROCEDURE — 99214 OFFICE O/P EST MOD 30 MIN: CPT | Performed by: INTERNAL MEDICINE

## 2020-09-03 NOTE — PROGRESS NOTES
Assessment/Plan:    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)    Lab Results   Component Value Date    HGBA1C 6 5 (H) 06/24/2020    The because of the acute elevation the patient's liver function testing he did stop his metformin  Patient's liver function tests have normalized  We feel that the causative agent for the elevation in liver function tests was medication he was given for his bladder cancer  He has stopped that medication  Patient will restart his metformin and he was told he needs to watch his diet closely  We will continue to monitor sugar levels    Benign essential hypertension  Blood pressure was slightly elevated today  Patient will continue with present medication  We will continue to monitor his renal function   Patient will be returning to the office in a few weeks for re-evaluation and blood pressure is still elevated would consider modification of treatment    Bladder tumor  Patient had repeat cytology of his urine showing abnormal cells probable continued malignancy in his bladder  Patient will be going for cystoscopy in the near future    Forearm mass, left  Patient states over the past 2 weeks he has developed a nonpainful soft tissue mass to his left forearm extending from the elbow down to his wrist   Denies any accident or injury  Has no pain  Patient does have a freely movable elongated ovoid mass to the medial portion of his left forearm  The mass is freely movable and again nontender  There is no ecchymosis noted  Patient will be going for CT scan for further evaluation, will return to the office after the CT scan is performed to discuss the results and what further evaluation may be necessary  Hyperlipidemia  Patient does have a history of coronary artery disease, hyperlipidemia    Patient had stopped his atorvastatin when he did have elevation in liver function test   Now that the labs have normalized patient was told he can restart his atorvastatin as previously  Patient was told again the importance of watching his diet, trying to eliminate fats and cholesterol from his diet  Diagnoses and all orders for this visit:    Forearm mass, left  -     CT upper extremity w wo contrast left; Future    Encounter for immunization  -     influenza vaccine, high-dose, PF 0 7 mL (FLUZONE HIGH-DOSE)    Type 2 diabetes mellitus with both eyes affected by mild nonproliferative retinopathy without macular edema, without long-term current use of insulin (HCC)    Benign essential hypertension    Bladder tumor    Mixed hyperlipidemia          Subjective:      Patient ID: Naun Mathis is a [de-identified] y o  male  Patient is an 80-year-old male history of multiple medical problems as outlined previously  The patient had recently undergone treatment for his bladder cancer  Had been given a chemotherapeutic agent which apparently did cause elevation in liver function testing  This was followed very closely by Gastroenterology and is liver function is now returned to normal   The patient did have a CBC and EKG prior to the appointment today these were preoperativepatient has no acute changes  , patient states that he does have a new finding, soft tissue mass to his left forearm period noticed over the past 2 weeks  Nontender to the touch  No accident or injury causing this  No decreased strength to the left arm        The following portions of the patient's history were reviewed and updated as appropriate: He  has a past medical history of Acute kidney injury ([address] ), Arthritis, Wright esophagus, BPH with obstruction/lower urinary tract symptoms, CAD (coronary artery disease), Cancer ([address] ), Cataract, acquired, Diabetes mellitus ([address] ), Diabetic neuropathy ([address] ), Enlarged prostate with lower urinary tract symptoms (LUTS), Erectile dysfunction, GERD (gastroesophageal reflux disease), Hemoptysis, Hypercholesterolemia, Hypertension, Macular degeneration, Myocardial infarction ([facility] ) (1998), OAB (overactive bladder), Testicular hypofunction, Testicular hypogonadism, Tinnitus, Umbilical hernia, Urge incontinence of urine, and Wears glasses  He   Patient Active Problem List    Diagnosis Date Noted    Forearm mass, left 09/03/2020    Transaminitis 07/21/2020    Liver function study, abnormal 06/25/2020    Malignant neoplasm of urinary bladder neck ([facility] ) 03/24/2020    Positive urinary cytology 11/05/2019    Coronary artery disease involving native coronary artery of native heart without angina pectoris 09/09/2019    Ischemic cardiomyopathy 09/09/2019    History of bladder cancer 07/30/2019    Medicare annual wellness visit, subsequent 05/23/2019    Closed fracture of upper end of right fibula 03/11/2019    Obesity (BMI 30 0-34 9) 01/22/2019    Malignant neoplasm of lateral wall of urinary bladder ([facility] ) 01/10/2019    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 01/10/2019    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/2019    Type 2 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 ([facility] ) 12/05/2018    Bladder tumor 11/01/2018    Overactive bladder 10/10/2018    Acute kidney injury ([facility] ) 05/30/2018    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    Backache 10/21/2013    Benign essential hypertension 10/11/2012    DMII (diabetes mellitus, type 2) ([facility] ) 10/11/2012    Hyperlipidemia 10/11/2012     He  has a past surgical history that includes pr colonoscopy flx dx w/collj spec when pfrmd (N/A, 4/25/2016); Cystoscopy (2013); Coronary artery bypass graft (07/16/2014); Colonoscopy; Inguinal hernia repair (Bilateral, 2015); Umbilical hernia repair (2012); Esophagogastroduodenoscopy; Tonsillectomy; Photodynamic Therapy; Transurethral resection of prostate (N/A, 12/20/2018);  [place] retrograde pyelogram (12/20/2018); pr cystourethroscopy,fulgur <0 5 cm lesn (N/A, 12/5/2019); [place] retrograde pyelogram (12/5/2019); pr cystourethroscopy,fulgur 2-5 cm estiven (N/A, 4/16/2020); and Upper gastrointestinal endoscopy  His family history includes Cancer in his mother; Coronary artery disease in his father; Diabetes in his father; Heart disease in his father; Hyperlipidemia in his father; Hypertension in his father; Other in his mother  He  reports that he quit smoking about 48 years ago  His smoking use included cigarettes  He has a 13 00 pack-year smoking history  He has never used smokeless tobacco  He reports current alcohol use of about 2 0 standard drinks of alcohol per week  He reports that he does not use drugs  Current Outpatient Medications   Medication Sig Dispense Refill    Cholecalciferol (VITAMIN D) 50 MCG (2000 UT) tablet Take 2,000 Units by mouth daily      irbesartan (AVAPRO) 300 mg tablet TAKE ONE TABLET BY MOUTH EVERY DAY 90 tablet 3    metoprolol tartrate (LOPRESSOR) 50 mg tablet TAKE ONE TABLET BY MOUTH TWICE A  tablet 4    Multiple Vitamins-Minerals (CENTRUM SILVER 50+MEN PO) Take 1 tablet by mouth daily        Multiple Vitamins-Minerals (OCUVITE-LUTEIN PO) Take 1 tablet by mouth 2 (two) times a day        omeprazole (PriLOSEC) 40 MG capsule Take 40 mg by mouth daily at bedtime        phenazopyridine (PYRIDIUM) 200 mg tablet Take 1 tablet (200 mg total) by mouth 3 (three) times a day as needed for bladder spasms (Dysuria) 10 tablet 0    solifenacin (VESICARE) 10 MG tablet TAKE ONE TABLET BY MOUTH EVERY DAY 90 tablet 2     No current facility-administered medications for this visit        Current Outpatient Medications on File Prior to Visit   Medication Sig    Cholecalciferol (VITAMIN D) 50 MCG (2000 UT) tablet Take 2,000 Units by mouth daily    irbesartan (AVAPRO) 300 mg tablet TAKE ONE TABLET BY MOUTH EVERY DAY    metoprolol tartrate (LOPRESSOR) 50 mg tablet TAKE ONE TABLET BY MOUTH TWICE A DAY    Multiple Vitamins-Minerals (CENTRUM SILVER 50+MEN PO) Take 1 tablet by mouth daily      Multiple Vitamins-Minerals (OCUVITE-LUTEIN PO) Take 1 tablet by mouth 2 (two) times a day      omeprazole (PriLOSEC) 40 MG capsule Take 40 mg by mouth daily at bedtime      phenazopyridine (PYRIDIUM) 200 mg tablet Take 1 tablet (200 mg total) by mouth 3 (three) times a day as needed for bladder spasms (Dysuria)    solifenacin (VESICARE) 10 MG tablet TAKE ONE TABLET BY MOUTH EVERY DAY    [DISCONTINUED] metFORMIN (GLUCOPHAGE-XR) 500 mg 24 hr tablet TAKE TWO TABLETS BY MOUTH TWICE A DAY WITH MEALS (Patient not taking: Reported on 8/13/2020)     No current facility-administered medications on file prior to visit  He is allergic to morphine and related       Review of Systems   Constitutional: Negative  HENT: Negative  Eyes: Negative  Respiratory: Negative  Cardiovascular: Positive for chest pain (Episode of chest pain discomfort approximately 2 weeks ago resolved spontaneously with no recurrence)  Negative for palpitations and leg swelling  Gastrointestinal: Negative  Endocrine: Negative  Genitourinary: Negative  Musculoskeletal: Negative  Development of soft tissue mass to his left forearm recently   Skin: Negative  Allergic/Immunologic: Negative  Neurological: Negative  Hematological: Negative  Psychiatric/Behavioral: Negative  Objective:      /80   Pulse 59   Temp 98 °F (36 7 °C)   Ht 5' 8" (1 727 m)   Wt 85 3 kg (188 lb)   SpO2 98%   BMI 28 59 kg/m²          Physical Exam  Vitals signs and nursing note reviewed  Constitutional:       General: He is not in acute distress  Appearance: Normal appearance  He is not ill-appearing, toxic-appearing or diaphoretic  Comments: Pleasant, overweight [de-identified] male who is awake alert no acute distress and oriented x3   HENT:      Head: Normocephalic and atraumatic  Right Ear: Tympanic membrane, ear canal and external ear normal  There is no impacted cerumen        Left Ear: Tympanic membrane, ear canal and external ear normal  There is no impacted cerumen  Nose: Nose normal  No congestion or rhinorrhea  Mouth/Throat:      Mouth: Mucous membranes are moist       Pharynx: Oropharynx is clear  No oropharyngeal exudate or posterior oropharyngeal erythema  Eyes:      General: No scleral icterus  Right eye: No discharge  Left eye: No discharge  Extraocular Movements: Extraocular movements intact  Conjunctiva/sclera: Conjunctivae normal       Pupils: Pupils are equal, round, and reactive to light  Neck:      Musculoskeletal: Normal range of motion and neck supple  No neck rigidity or muscular tenderness  Vascular: No carotid bruit  Cardiovascular:      Rate and Rhythm: Normal rate and regular rhythm  Pulses: Normal pulses  Heart sounds: Normal heart sounds  No murmur  No friction rub  No gallop  Pulmonary:      Effort: Pulmonary effort is normal  No respiratory distress  Breath sounds: Normal breath sounds  No stridor  No wheezing, rhonchi or rales  Chest:      Chest wall: No tenderness  Abdominal:      General: Bowel sounds are normal  There is no distension  Palpations: Abdomen is soft  There is no mass  Tenderness: There is no abdominal tenderness  There is no right CVA tenderness, left CVA tenderness, guarding or rebound  Hernia: No hernia is present  Musculoskeletal:         General: Swelling (As noted development of soft tissue mass to the left forearm extending from the elbow down to his wrist   Nontender to palpation, freely movable) present  Lymphadenopathy:      Cervical: No cervical adenopathy  Skin:     General: Skin is warm and dry  Coloration: Skin is not jaundiced or pale  Findings: No bruising, lesion or rash  Neurological:      General: No focal deficit present  Mental Status: He is alert and oriented to person, place, and time  Cranial Nerves: No cranial nerve deficit        Motor: No weakness  Gait: Gait normal    Psychiatric:         Mood and Affect: Mood normal          Behavior: Behavior normal          Thought Content:  Thought content normal          Judgment: Judgment normal

## 2020-09-03 NOTE — ASSESSMENT & PLAN NOTE
Patient had repeat cytology of his urine showing abnormal cells probable continued malignancy in his bladder    Patient will be going for cystoscopy in the near future

## 2020-09-03 NOTE — ASSESSMENT & PLAN NOTE
Patient states over the past 2 weeks he has developed a nonpainful soft tissue mass to his left forearm extending from the elbow down to his wrist   Denies any accident or injury  Has no pain  Patient does have a freely movable elongated ovoid mass to the medial portion of his left forearm  The mass is freely movable and again nontender  There is no ecchymosis noted  Patient will be going for CT scan for further evaluation, will return to the office after the CT scan is performed to discuss the results and what further evaluation may be necessary

## 2020-09-03 NOTE — ASSESSMENT & PLAN NOTE
Patient does have a history of coronary artery disease, hyperlipidemia  Patient had stopped his atorvastatin when he did have elevation in liver function test   Now that the labs have normalized patient was told he can restart his atorvastatin as previously  Patient was told again the importance of watching his diet, trying to eliminate fats and cholesterol from his diet

## 2020-09-03 NOTE — ASSESSMENT & PLAN NOTE
Lab Results   Component Value Date    HGBA1C 6 5 (H) 06/24/2020    The because of the acute elevation the patient's liver function testing he did stop his metformin  Patient's liver function tests have normalized  We feel that the causative agent for the elevation in liver function tests was medication he was given for his bladder cancer  He has stopped that medication  Patient will restart his metformin and he was told he needs to watch his diet closely    We will continue to monitor sugar levels

## 2020-09-08 ENCOUNTER — OFFICE VISIT (OUTPATIENT)
Dept: HEMATOLOGY ONCOLOGY | Facility: CLINIC | Age: 80
End: 2020-09-08
Payer: MEDICARE

## 2020-09-08 VITALS
HEART RATE: 57 BPM | WEIGHT: 186.4 LBS | SYSTOLIC BLOOD PRESSURE: 142 MMHG | OXYGEN SATURATION: 97 % | DIASTOLIC BLOOD PRESSURE: 80 MMHG | BODY MASS INDEX: 28.25 KG/M2 | HEIGHT: 68 IN | TEMPERATURE: 97.5 F | RESPIRATION RATE: 16 BRPM

## 2020-09-08 DIAGNOSIS — C67.2 MALIGNANT NEOPLASM OF LATERAL WALL OF URINARY BLADDER (HCC): Primary | ICD-10-CM

## 2020-09-08 PROCEDURE — 99213 OFFICE O/P EST LOW 20 MIN: CPT | Performed by: INTERNAL MEDICINE

## 2020-09-08 NOTE — PROGRESS NOTES
Hematology Outpatient Follow - Up Note  Hemal Paz [de-identified] y o  male MRN: @ Encounter: [id number]        Date:  9/8/2020        Assessment/ Plan:    51-year-old  male with superficial bladder cancers status post many BCG ease with refractoriness to BCG ease status post mitomycin instillation, cystoscopy on 04/2020 showed carcinoma in situ no evidence of invasive component    He received Pembrolizumab 200 mg flat dose every 3 weeks on 05/26/2020 after 2 cycles he had grade 3 elevation of the liver enzymes, thickening of the skin of the forearms consistent with toxicity to Pembrolizumab    He is off Pembrolizumab with normalization of the liver enzymes    He is not candidate for immunotherapy    Follow-up on as-needed basis            HPI:  51-year-old  male with history of hypertension, coronary artery disease status post open heart surgery, diabetes mellitus type 2, diabetic neuropathy, he had a history of recurrent superficial bladder cancer status post many BCG unfortunately refractory to BCG, he had 1 time trans urethral resection of the bladder tumor and mitomycin instillation     Repeat cystoscopy on 04/16/2020 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, no evidence of invasive bladder cancers     He does not smoke, he drinks bourbon and vodka every night     Denies any headache blurred vision nausea vomiting diarrhea constipation dysuria hematuria melena hematochezia heat or cold intolerance  Initiated on Pembrolizumab 200 mg flat dose every 3 weeks on 05/26/2020, after 2 doses there was grade 3 elevation in the liver enzyme, alkaline phosphatase requiring discontinuation of Pembrolizumab, and later on normalization of the liver enzymes    Evaluated by ID there was no evidence of TB of the liver    Interval History:        Previous Treatment:         Test Results:    Imaging: No results found      Labs:   Lab Results   Component Value Date    WBC 11 60 (H) 09/01/2020    HGB 14 5 09/01/2020    HCT 43 4 09/01/2020    MCV 94 09/01/2020     09/01/2020     Lab Results   Component Value Date     08/13/2015    K 4 0 09/01/2020     09/01/2020    CO2 26 09/01/2020    ANIONGAP 10 08/13/2015    BUN 25 09/01/2020    CREATININE 1 48 (H) 09/01/2020    GLUCOSE 128 08/13/2015    GLUF 156 (H) 09/01/2020    CALCIUM 9 4 09/01/2020    CORRECTEDCA 11 0 (H) 08/04/2020    AST 16 09/01/2020    ALT 26 09/01/2020    ALKPHOS 103 09/01/2020    PROT 6 8 08/13/2015    BILITOT 0 58 08/13/2015    EGFR 44 09/01/2020       Lab Results   Component Value Date    IRON 67 08/04/2020    TIBC 301 08/04/2020    FERRITIN 166 08/04/2020       Lab Results   Component Value Date    MFQGGJSD44 666 08/13/2015         ROS: Review of Systems   Constitutional: Negative  Negative for appetite change, chills, diaphoresis, fatigue, fever and unexpected weight change  HENT:   Negative for hearing loss, lump/mass, mouth sores, nosebleeds, sore throat, trouble swallowing and voice change  Eyes: Negative  Negative for eye problems and icterus  Respiratory: Negative  Negative for chest tightness, cough, hemoptysis and shortness of breath  Cardiovascular: Negative for chest pain and leg swelling  Gastrointestinal: Negative for abdominal distention, abdominal pain, blood in stool, constipation, diarrhea and nausea  Endocrine: Negative  Genitourinary: Negative for dysuria, frequency, hematuria and pelvic pain  Musculoskeletal: Negative  Negative for arthralgias, back pain, flank pain, gait problem, myalgias and neck stiffness  Skin: Negative for itching and rash  Neurological: Negative for dizziness, gait problem, headaches, light-headedness, numbness and speech difficulty  Hematological: Negative for adenopathy  Does not bruise/bleed easily  Psychiatric/Behavioral: Negative for confusion, decreased concentration, depression and sleep disturbance   The patient is not nervous/anxious  Current Medications: Reviewed  Allergies: Reviewed  PMH/FH/SH:  Reviewed      Physical Exam:    Body surface area is 1 98 meters squared  Wt Readings from Last 3 Encounters:   09/08/20 84 6 kg (186 lb 6 4 oz)   09/03/20 85 3 kg (188 lb)   08/18/20 86 kg (189 lb 9 6 oz)        Temp Readings from Last 3 Encounters:   09/08/20 97 5 °F (36 4 °C) (Tympanic)   09/03/20 98 °F (36 7 °C)   08/18/20 98 1 °F (36 7 °C)        BP Readings from Last 3 Encounters:   09/08/20 142/80   09/03/20 148/80   08/18/20 140/68         Pulse Readings from Last 3 Encounters:   09/08/20 57   09/03/20 59   08/18/20 (!) 54        Physical Exam  Vitals signs reviewed  Constitutional:       General: He is not in acute distress  Appearance: He is well-developed  He is not diaphoretic  HENT:      Head: Normocephalic and atraumatic  Eyes:      Conjunctiva/sclera: Conjunctivae normal    Neck:      Musculoskeletal: Normal range of motion and neck supple  Trachea: No tracheal deviation  Cardiovascular:      Rate and Rhythm: Normal rate and regular rhythm  Heart sounds: No murmur  No friction rub  No gallop  Pulmonary:      Effort: Pulmonary effort is normal  No respiratory distress  Breath sounds: Normal breath sounds  No wheezing or rales  Chest:      Chest wall: No tenderness  Abdominal:      General: There is no distension  Palpations: Abdomen is soft  Tenderness: There is no abdominal tenderness  Musculoskeletal:      Right lower leg: No edema  Left lower leg: No edema  Lymphadenopathy:      Cervical: No cervical adenopathy  Skin:     General: Skin is warm and dry  Coloration: Skin is not pale  Findings: No erythema  Neurological:      Mental Status: He is alert and oriented to person, place, and time  Psychiatric:         Behavior: Behavior normal          Thought Content:  Thought content normal          Judgment: Judgment normal  ECO    Goals and Barriers:  Current Goal: Minimize effects of disease  Barriers: None  Patient's Capacity to Self Care:  Patient is able to self care      Code Status: @[facility]@

## 2020-09-09 ENCOUNTER — ANESTHESIA EVENT (OUTPATIENT)
Dept: PERIOP | Facility: HOSPITAL | Age: 80
End: 2020-09-09
Payer: MEDICARE

## 2020-09-09 PROBLEM — N18.30 STAGE 3 CHRONIC KIDNEY DISEASE (HCC): Status: ACTIVE | Noted: 2020-09-09

## 2020-09-09 RX ORDER — VITAMIN E 268 MG
400 CAPSULE ORAL DAILY
COMMUNITY

## 2020-09-09 NOTE — ANESTHESIA PREPROCEDURE EVALUATION
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)      GI/HEPATIC  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  LFT ok  hx of Wright's      /RENAL  hx of BPH  bladder CA   (+) Acute kidney injury ([address] )   (+) 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   (+) Stage 3 chronic kidney disease (HCC)      HEMATOLOGY (within normal limits)      MUSCULOSKELETAL   (+) Backache      NEURO/PSYCH   (+) History of bladder cancer      PULMONARY  ex smoker   (-) Sleep apnea   (-) Smoking   (-) URI (upper respiratory infection)        Physical Exam    Airway      TM Distance: >3 FB  Neck ROM: limited     Dental       Cardiovascular  Cardiovascular exam normal    Pulmonary  Pulmonary exam normal     Other Findings  Fixed upper and lower teeth and in good repair      Anesthesia Plan  ASA Score- 3     Anesthesia Type- general with ASA Monitors  Additional Monitors:   Airway Plan: LMA  Plan Factors-Exercise tolerance (METS): >4 METS  Chart reviewed  EKG reviewed  Imaging results reviewed  Patient summary reviewed  Patient is not a current smoker  Patient instructed to abstain from smoking on day of procedure  Patient did not smoke on day of surgery  Induction- intravenous  Postoperative Plan- Plan for postoperative opioid use  Informed Consent- Anesthetic plan and risks discussed with patient and spouse  I personally reviewed this patient with the CRNA  Discussed and agreed on the Anesthesia Plan with the RACHEL Canchola

## 2020-09-09 NOTE — PRE-PROCEDURE INSTRUCTIONS
Pre-Surgery Instructions:   Medication Instructions    Cholecalciferol (VITAMIN D) 50 MCG (2000 UT) tablet Instructed patient per Anesthesia Guidelines   irbesartan (AVAPRO) 300 mg tablet Instructed patient per Anesthesia Guidelines   metFORMIN (GLUCOPHAGE) 500 mg tablet Instructed patient per Anesthesia Guidelines   metoprolol tartrate (LOPRESSOR) 50 mg tablet Instructed patient per Anesthesia Guidelines   Multiple Vitamins-Minerals (CENTRUM SILVER 50+MEN PO) Instructed patient per Anesthesia Guidelines   Multiple Vitamins-Minerals (OCUVITE-LUTEIN PO) Instructed patient per Anesthesia Guidelines   omeprazole (PriLOSEC) 40 MG capsule Instructed patient per Anesthesia Guidelines   solifenacin (VESICARE) 10 MG tablet Instructed patient per Anesthesia Guidelines   vitamin E, tocopherol, 400 units capsuleACE/ARB Med Class   Continue this medication up to the evening before surgery/procedure, but do not take the morning of the day of surgery  Beta blocker Med Class   Continue to take this heart medication on your normal schedule  If this is an oral medication and you take it in the morning, then you may take this medicine with a sip of water  Insulin Med Class   Pre-Surgery/Procedure Instructions for Adult Patients who Take Medicine for Diabetes or to Control their Blood Sugar   Day Before Surgery/Procedure  Use the directions based on the type of medicine you take for your diabetes  1  If you are having a procedure that does not require a bowel prep:  ? Pre-Mixed Insulin (Intermediate Acting: Humalog 75/25, Humulin 70/30  Novolog 70/30, Regular Insulin)  § Take ½ your regular dose the evening before your procedure  ? Rapid/Fast Acting Insulin/Long Acting Insulin (Humalog U200, NovoLog, Apidra, Lantus, Levemir, Chadd Diaz, Jorge L)  § Take your FULL regular dose the day before procedure    ? Oral Diabetic Medicines including Glipizide/Glimepiride/Glucotrol (sulfonylurea)  § Take your regular dose with dinner the evening before your procedure  2  If you are having a procedure (e g  Colonoscopy) that requires a bowel prep and you are allowed to have at least a clear liquid diet:  ? Pre-Mixed Insulin (Intermediate Acting: Humalog 75/25, Humulin 70/30, Novolog 70/30, Regular Insulin)  § Take ½ your regular dose the evening before your procedure  ? Rapid/Fast Acting Insulin (Humalog U200, NovoLog, Apidra, Fiasp)  § Take ½ your regular dose the evening before your procedure  ? Long Acting Insulin (Lantus, Levemir, [place])  § Take your FULL regular dose the day before procedure  ? Oral Glipizide/Glimepiride/Glucotrol (sulfonylurea)  § Take ½ your regular dose the evening before your procedure  ? Oral Diabetic Medicines that are NOT Glipizide/Glimepiride/Glucotrol  § Take your regular dose with dinner in the evening before your procedure    Day of Surgery/Procedure  · Long Acting Insulin (Lantus, Levemir, [place])  ? If you usually take your Long-Acting Insulin in the morning, take the full dose as scheduled  · With the exception of the morning Long-Acting Insulin noted above, DO NOT take ANY diabetic medicine on the day of your procedure unless you were instructed by the doctor who manages your diabetic medicines  · Continue to check your blood sugars  · If you have an insulin pump then consult with your Endocrinologist for instructions  · If you cannot see your Endocrinologist, on the day of the procedure set your insulin pump to your basal rate only  Please bring your insulin pump supplies to the hospital    This Educational material has been approved by the Patient Education Advisory Committee  Date prepared: 1/17/2018          Expiration date: 1/17/2019        Approval Number:        Vitamin Med Class   You may continue to take any vitamin that your surgeon has prescribed to you up to the day before surgery   If your surgeon has not specifically prescribed this vitamin or instructed you to continue then stop taking 7 days prior to surgery  Urinary antispasmodics Med Class Continue this medication up to the evening before surgery/procedure, but do not take the morning of the day of surgery  Instructed patient per Anesthesia Guidelines  Phone assessment completed with verbal understanding, NPO MN including gum and candy, am meds reviewed, shower, insurance card and photo ID,  for disch home and preop call from [facility] this afternoon

## 2020-09-10 ENCOUNTER — TELEPHONE (OUTPATIENT)
Dept: UROLOGY | Facility: MEDICAL CENTER | Age: 80
End: 2020-09-10

## 2020-09-10 ENCOUNTER — HOSPITAL ENCOUNTER (OUTPATIENT)
Facility: HOSPITAL | Age: 80
Setting detail: OUTPATIENT SURGERY
Discharge: HOME | End: 2020-09-10
Attending: UROLOGY | Admitting: UROLOGY
Payer: MEDICARE

## 2020-09-10 ENCOUNTER — APPOINTMENT (OUTPATIENT)
Dept: RADIOLOGY | Facility: HOSPITAL | Age: 80
End: 2020-09-10
Payer: MEDICARE

## 2020-09-10 ENCOUNTER — ANESTHESIA (OUTPATIENT)
Dept: PERIOP | Facility: HOSPITAL | Age: 80
End: 2020-09-10
Payer: MEDICARE

## 2020-09-10 VITALS — HEART RATE: 72 BPM

## 2020-09-10 VITALS
RESPIRATION RATE: 18 BRPM | OXYGEN SATURATION: 95 % | DIASTOLIC BLOOD PRESSURE: 86 MMHG | SYSTOLIC BLOOD PRESSURE: 191 MMHG | HEART RATE: 70 BPM | TEMPERATURE: 97.2 F

## 2020-09-10 DIAGNOSIS — C67.5 MALIGNANT NEOPLASM OF URINARY BLADDER NECK (HCC): ICD-10-CM

## 2020-09-10 DIAGNOSIS — C67.2 MALIGNANT NEOPLASM OF LATERAL WALL OF URINARY BLADDER (HCC): ICD-10-CM

## 2020-09-10 LAB — GLUCOSE SERPL-MCNC: 132 MG/DL (ref 65–140)

## 2020-09-10 PROCEDURE — 88173 CYTOPATH EVAL FNA REPORT: CPT | Performed by: PATHOLOGY

## 2020-09-10 PROCEDURE — NC001 PR NO CHARGE: Performed by: UROLOGY

## 2020-09-10 PROCEDURE — 82948 REAGENT STRIP/BLOOD GLUCOSE: CPT

## 2020-09-10 PROCEDURE — 52005 CYSTO W/URTRL CATHJ: CPT | Performed by: UROLOGY

## 2020-09-10 PROCEDURE — 88172 CYTP DX EVAL FNA 1ST EA SITE: CPT | Performed by: PATHOLOGY

## 2020-09-10 PROCEDURE — 88305 TISSUE EXAM BY PATHOLOGIST: CPT | Performed by: PATHOLOGY

## 2020-09-10 PROCEDURE — 52204 CYSTOSCOPY W/BIOPSY(S): CPT | Performed by: UROLOGY

## 2020-09-10 PROCEDURE — 74420 UROGRAPHY RTRGR +-KUB: CPT

## 2020-09-10 RX ORDER — FENTANYL CITRATE/PF 50 MCG/ML
25 SYRINGE (ML) INJECTION
Status: COMPLETED | OUTPATIENT
Start: 2020-09-10 | End: 2020-09-10

## 2020-09-10 RX ORDER — LIDOCAINE HYDROCHLORIDE 10 MG/ML
INJECTION, SOLUTION EPIDURAL; INFILTRATION; INTRACAUDAL; PERINEURAL AS NEEDED
Status: DISCONTINUED | OUTPATIENT
Start: 2020-09-10 | End: 2020-09-10

## 2020-09-10 RX ORDER — SODIUM CHLORIDE 9 MG/ML
125 INJECTION, SOLUTION INTRAVENOUS CONTINUOUS
Status: DISCONTINUED | OUTPATIENT
Start: 2020-09-10 | End: 2020-09-10 | Stop reason: HOSPADM

## 2020-09-10 RX ORDER — DIPHENHYDRAMINE HYDROCHLORIDE 50 MG/ML
12.5 INJECTION INTRAMUSCULAR; INTRAVENOUS ONCE AS NEEDED
Status: DISCONTINUED | OUTPATIENT
Start: 2020-09-10 | End: 2020-09-10 | Stop reason: HOSPADM

## 2020-09-10 RX ORDER — MAGNESIUM HYDROXIDE 1200 MG/15ML
LIQUID ORAL AS NEEDED
Status: DISCONTINUED | OUTPATIENT
Start: 2020-09-10 | End: 2020-09-10 | Stop reason: HOSPADM

## 2020-09-10 RX ORDER — ONDANSETRON 2 MG/ML
4 INJECTION INTRAMUSCULAR; INTRAVENOUS EVERY 6 HOURS PRN
Status: DISCONTINUED | OUTPATIENT
Start: 2020-09-10 | End: 2020-09-10 | Stop reason: HOSPADM

## 2020-09-10 RX ORDER — EPHEDRINE SULFATE 50 MG/ML
INJECTION INTRAVENOUS AS NEEDED
Status: DISCONTINUED | OUTPATIENT
Start: 2020-09-10 | End: 2020-09-10

## 2020-09-10 RX ORDER — DEXAMETHASONE SODIUM PHOSPHATE 10 MG/ML
INJECTION, SOLUTION INTRAMUSCULAR; INTRAVENOUS AS NEEDED
Status: DISCONTINUED | OUTPATIENT
Start: 2020-09-10 | End: 2020-09-10

## 2020-09-10 RX ORDER — DEXAMETHASONE SODIUM PHOSPHATE 4 MG/ML
4 INJECTION, SOLUTION INTRA-ARTICULAR; INTRALESIONAL; INTRAMUSCULAR; INTRAVENOUS; SOFT TISSUE ONCE AS NEEDED
Status: DISCONTINUED | OUTPATIENT
Start: 2020-09-10 | End: 2020-09-10 | Stop reason: HOSPADM

## 2020-09-10 RX ORDER — PHENAZOPYRIDINE HYDROCHLORIDE 100 MG/1
200 TABLET, FILM COATED ORAL ONCE AS NEEDED
Status: COMPLETED | OUTPATIENT
Start: 2020-09-10 | End: 2020-09-10

## 2020-09-10 RX ORDER — FENTANYL CITRATE 50 UG/ML
INJECTION, SOLUTION INTRAMUSCULAR; INTRAVENOUS AS NEEDED
Status: DISCONTINUED | OUTPATIENT
Start: 2020-09-10 | End: 2020-09-10

## 2020-09-10 RX ORDER — PHENAZOPYRIDINE HYDROCHLORIDE 200 MG/1
200 TABLET, FILM COATED ORAL 3 TIMES DAILY PRN
Qty: 10 TABLET | Refills: 0 | Status: SHIPPED | OUTPATIENT
Start: 2020-09-10 | End: 2020-11-20

## 2020-09-10 RX ORDER — ONDANSETRON 2 MG/ML
INJECTION INTRAMUSCULAR; INTRAVENOUS AS NEEDED
Status: DISCONTINUED | OUTPATIENT
Start: 2020-09-10 | End: 2020-09-10

## 2020-09-10 RX ORDER — PROPOFOL 10 MG/ML
INJECTION, EMULSION INTRAVENOUS AS NEEDED
Status: DISCONTINUED | OUTPATIENT
Start: 2020-09-10 | End: 2020-09-10

## 2020-09-10 RX ORDER — CEFAZOLIN SODIUM 2 G/50ML
2000 SOLUTION INTRAVENOUS ONCE
Status: COMPLETED | OUTPATIENT
Start: 2020-09-10 | End: 2020-09-10

## 2020-09-10 RX ORDER — ACETAMINOPHEN 325 MG/1
650 TABLET ORAL EVERY 6 HOURS PRN
Status: DISCONTINUED | OUTPATIENT
Start: 2020-09-10 | End: 2020-09-10 | Stop reason: HOSPADM

## 2020-09-10 RX ORDER — FENTANYL CITRATE/PF 50 MCG/ML
12.5 SYRINGE (ML) INJECTION
Status: DISCONTINUED | OUTPATIENT
Start: 2020-09-10 | End: 2020-09-10 | Stop reason: HOSPADM

## 2020-09-10 RX ORDER — LABETALOL 20 MG/4 ML (5 MG/ML) INTRAVENOUS SYRINGE
10 ONCE
Status: COMPLETED | OUTPATIENT
Start: 2020-09-10 | End: 2020-09-10

## 2020-09-10 RX ORDER — MEPERIDINE HYDROCHLORIDE 25 MG/ML
12.5 INJECTION INTRAMUSCULAR; INTRAVENOUS; SUBCUTANEOUS
Status: DISCONTINUED | OUTPATIENT
Start: 2020-09-10 | End: 2020-09-10 | Stop reason: HOSPADM

## 2020-09-10 RX ORDER — PROMETHAZINE HYDROCHLORIDE 25 MG/ML
12.5 INJECTION, SOLUTION INTRAMUSCULAR; INTRAVENOUS ONCE AS NEEDED
Status: DISCONTINUED | OUTPATIENT
Start: 2020-09-10 | End: 2020-09-10 | Stop reason: HOSPADM

## 2020-09-10 RX ADMIN — DEXAMETHASONE SODIUM PHOSPHATE 4 MG: 10 INJECTION, SOLUTION INTRAMUSCULAR; INTRAVENOUS at 07:41

## 2020-09-10 RX ADMIN — FENTANYL CITRATE 25 MCG: 50 INJECTION INTRAMUSCULAR; INTRAVENOUS at 09:20

## 2020-09-10 RX ADMIN — EPHEDRINE SULFATE 5 MG: 50 INJECTION, SOLUTION INTRAVENOUS at 08:30

## 2020-09-10 RX ADMIN — FENTANYL CITRATE 25 MCG: 50 INJECTION INTRAMUSCULAR; INTRAVENOUS at 09:35

## 2020-09-10 RX ADMIN — FENTANYL CITRATE 50 MCG: 50 INJECTION INTRAMUSCULAR; INTRAVENOUS at 07:41

## 2020-09-10 RX ADMIN — LABETALOL 20 MG/4 ML (5 MG/ML) INTRAVENOUS SYRINGE 10 MG: at 10:05

## 2020-09-10 RX ADMIN — FENTANYL CITRATE 25 MCG: 50 INJECTION INTRAMUSCULAR; INTRAVENOUS at 09:27

## 2020-09-10 RX ADMIN — PHENAZOPYRIDINE HYDROCHLORIDE 200 MG: 100 TABLET ORAL at 09:27

## 2020-09-10 RX ADMIN — FENTANYL CITRATE 25 MCG: 50 INJECTION INTRAMUSCULAR; INTRAVENOUS at 09:47

## 2020-09-10 RX ADMIN — FENTANYL CITRATE 50 MCG: 50 INJECTION INTRAMUSCULAR; INTRAVENOUS at 07:36

## 2020-09-10 RX ADMIN — PROPOFOL 200 MG: 10 INJECTION, EMULSION INTRAVENOUS at 07:36

## 2020-09-10 RX ADMIN — EPHEDRINE SULFATE 5 MG: 50 INJECTION, SOLUTION INTRAVENOUS at 08:21

## 2020-09-10 RX ADMIN — SODIUM CHLORIDE: 0.9 INJECTION, SOLUTION INTRAVENOUS at 07:31

## 2020-09-10 RX ADMIN — LIDOCAINE HYDROCHLORIDE 50 MG: 10 INJECTION, SOLUTION EPIDURAL; INFILTRATION; INTRACAUDAL; PERINEURAL at 07:36

## 2020-09-10 RX ADMIN — CEFAZOLIN SODIUM 2000 MG: 2 SOLUTION INTRAVENOUS at 07:25

## 2020-09-10 RX ADMIN — ONDANSETRON HYDROCHLORIDE 4 MG: 2 INJECTION, SOLUTION INTRAMUSCULAR; INTRAVENOUS at 07:41

## 2020-09-10 RX ADMIN — EPHEDRINE SULFATE 10 MG: 50 INJECTION, SOLUTION INTRAVENOUS at 07:54

## 2020-09-10 NOTE — TELEPHONE ENCOUNTER
Pt is currently still admitted in the hospital  Pt is scheduled for sweet catheter removal at 8am with nurse for tomorrow 9/11/2020  Pt needs to be notified of this appointment  He still needs a 2 week follow up for pathology review

## 2020-09-10 NOTE — DISCHARGE SUMMARY
DISCHARGE SUMMARY     Patient Name: Nawaf Muro    Patient MRN: [id number]    Admitting Provider: Mary Javed MD    Discharging Provider: Mary Javed MD    Primary Care Physician at Discharge: Savannahmadi [address]ay[address] [phone]     Admission Date: 9/10/2020     Discharge Date: 9/10/2020    Admission Diagnosis   Malignant neoplasm of lateral wall of urinary bladder ([facility] ) [C67 2]    Discharge Diagnoses  Active Problems:    Stage [facility]      Medications  Current Discharge Medication List         Current Discharge Medication List      CONTINUE these medications which have NOT CHANGED    Details   Cholecalciferol (VITAMIN D) 50 MCG (2000 UT) tablet Take 2,000 Units by mouth daily      irbesartan (AVAPRO) 300 mg tablet TAKE ONE TABLET BY MOUTH EVERY DAY  Qty: 90 tablet, Refills: 3    Associated Diagnoses: Essential hypertension      metFORMIN (GLUCOPHAGE) 500 mg tablet Take 500 mg by mouth 2 (two) times a day with meals      metoprolol tartrate (LOPRESSOR) 50 mg tablet TAKE ONE TABLET BY MOUTH TWICE A DAY  Qty: 180 tablet, Refills: 4    Associated Diagnoses: Essential hypertension, benign      !! Multiple Vitamins-Minerals (CENTRUM SILVER 50+MEN PO) Take 1 tablet by mouth daily        !! Multiple Vitamins-Minerals (OCUVITE-LUTEIN PO) Take 1 tablet by mouth 2 (two) times a day Pt is taking preservision      omeprazole (PriLOSEC) 40 MG capsule Take 40 mg by mouth daily at bedtime        solifenacin (VESICARE) 10 MG tablet TAKE ONE TABLET BY MOUTH EVERY DAY  Qty: 90 tablet, Refills: 2    Associated Diagnoses: Overactive bladder      vitamin E, tocopherol, 400 units capsule Take 400 Units by mouth daily      phenazopyridine (PYRIDIUM) 200 mg tablet Take 1 tablet (200 mg total) by mouth 3 (three) times a day as needed for bladder spasms (Dysuria)  Qty: 10 tablet, Refills: 0    Associated Diagnoses: 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 ! - Potential duplicate medications found  Please discuss with provider  Allergies  Allergies   Allergen Reactions    Morphine And Related Other (See Comments)     While having an MI patient received morphine and had adverse reaction but doesn't know what happened  Outpatient Follow-Up  Iris Mark MD  [address]  [place] [address]  [phone]    Follow up  The office will call to arrange voiding trial and follow-up visit  ? Discharge Disposition  Home    Operative Procedures Performed  Procedure(s):  CYSTOSCOPY, COLLECTION OF LEFT KIDNEY CYTOLOGY, BILATERAL RETROGRADE PYELOGRAM, BLADDER WALL BIOPSIES  AND FULGERAION, RANDOM BLADDER TUMOR BIOPSIES  ? Physical Exam at Discharge  Condition of Patient on Discharge: stable  ?   Iris Mark MD

## 2020-09-10 NOTE — DISCHARGE INSTRUCTIONS
Cystoscopy   WHAT YOU NEED TO KNOW:   A cystoscopy is a procedure to look inside of your urethra and bladder using a cystoscope  A cystoscope is a small tube with a light and magnifying camera on the end  The procedure is used to diagnose and treat conditions of the bladder, urethra, and prostate  The procedure is also done to remove stones or blood clots from the urethra or bladder  Your healthcare provider may do other tests, such as ureteroscopy, during a cystoscopy  DISCHARGE INSTRUCTIONS:   Call 911 if:   · You suddenly have chest pain or trouble breathing  Seek care immediately if:   · Your urine turns from pink to red, or you have clots in your urine  · You cannot urinate and your bladder feels full  · Your pain or burning becomes worse or lasts longer than 2 days  Contact your healthcare provider or urologist if:   · Your urine stays pink for longer than 3 days  · You urinate less than normal, or still feel like you have to urinate after you use the bathroom  · Your skin is itchy, swollen, or has a new rash  · You have a fever and chills  · You have questions or concerns about your condition or care  Medicines: You may  be given any of the following:  · Antibiotics  help treat or prevent a bacterial infection  · Acetaminophen  decreases pain and fever  It is available without a doctor's order  Ask how much to take and how often to take it  Follow directions  Read the labels of all other medicines you are using to see if they also contain acetaminophen, or ask your doctor or pharmacist  Acetaminophen can cause liver damage if not taken correctly  Do not use more than 4 grams (4,000 milligrams) total of acetaminophen in one day  · Take your medicine as directed  Contact your healthcare provider if you think your medicine is not helping or if you have side effects  Tell him or her if you are allergic to any medicine  Keep a list of the medicines, vitamins, and herbs you take  Include the amounts, and when and why you take them  Bring the list or the pill bottles to follow-up visits  Carry your medicine list with you in case of an emergency  Follow up with your healthcare provider as directed: You may need to have another cystoscopy  Write down your questions so you remember to ask them during your visits  Self-care:   · Drink at least 3 to 4 glasses of water daily for 2 days after your procedure  Do not drink acidic juices such as orange juice and lemonade  Drink water to help prevent blood clots from forming  It can also help decrease the amount of acid in your urine  Acid in your urine may increase the burning feeling when you urinate  · Sit in a warm tub of water  Warm water may relieve pain and bladder spasms  · Do not have sex  until your healthcare provider tells you it is okay  Sex may increase your risk for a urinary tract infection  © 2017 [address] [employer] Information is for End User's use only and may not be sold, redistributed or otherwise used for commercial purposes  All illustrations and images included in CareNotes® are the copyrighted property of [employer]  or [employer]  The above information is an  only  It is not intended as medical advice for individual conditions or treatments  Talk to your doctor, nurse or pharmacist before following any medical regimen to see if it is safe and effective for you  Goins Catheter Placement and Care   AMBULATORY CARE:   A Goins catheter  is a sterile tube that is inserted into your bladder to drain urine  It is also called an indwelling urinary catheter  The tip of the catheter has a small balloon filled with solution that holds the catheter in your bladder  How a Goins catheter is placed:   · Your healthcare provider will clean your genital area with a sterile solution  He or she will put lubricant jelly on the catheter to help it go in smoothly   The end of the catheter with the deflated balloon will be inserted into your urethra  The catheter will be moved slowly and gently into your bladder  You may be asked to take slow, deep breaths or to push as if you were trying to urinate as the catheter is inserted  · When your healthcare provider sees urine flowing from the catheter, he or she will fill the balloon at the end of the catheter  The balloon holds the catheter in place so it does not come out  Your healthcare provider will attach the open end of the catheter to a sterile drainage bag  Seek care immediately if:   · Your catheter comes out  · You suddenly have material that looks like sand in the tubing or drainage bag  · No urine is draining into the bag and you have checked the system  · You have pain in your hip, back, pelvis, or lower abdomen  · You are confused or cannot think clearly  Contact your healthcare provider if:   · You have a fever  · You have bladder spasms for more than 1 day after the catheter is placed  · You see blood in the tubing or drainage bag  · You have a rash or itching where the catheter tube is secured to your skin  · Urine leaks from or around the catheter, tubing, or drainage bag  · The closed drainage system has accidently come open or apart  · You see a layer of crystals inside the tubing  · You have questions or concerns about your condition or care  Care for your Goins catheter:   · Clean your genital area 2 times every day  Clean your catheter and the area around where it was inserted  Use soap and water  Clean your anal opening and catheter area after every bowel movement  · Secure the catheter tube  so you do not pull or move the catheter  This helps prevent pain and bladder spasms  Healthcare providers will show you how to use medical tape or a strap to secure the catheter tube to your body  · Keep a closed drainage system    Your Goins catheter should always be attached to the drainage bag to form a closed system  Do not disconnect any part of the closed system unless you need to change the bag  Care for your drainage bag:   · Ask if a leg bag is right for you  A leg bag can be worn under your clothes  Ask your healthcare provider for more information about a leg bag  · Keep the drainage bag below the level of your waist   This helps stop urine from moving back up the tubing and into your bladder  Do not loop or kink the tubing  This can cause urine to back up and collect in your bladder  Do not let the drainage bag touch or lie on the floor  · Empty the drainage bag when needed  The weight of a full drainage bag can be painful  Empty the drainage bag every 3 to 6 hours or when it is ? full  · Clean and change the drainage bag as directed  Ask your healthcare provider how often you should change the drainage bag and what cleaning solution to use  Wear disposable gloves when you change the bag  Do not allow the end of the catheter or tubing to touch anything  Clean the ends with an alcohol pad before you reconnect them  What to do if problems develop:   · No urine is draining into the bag:      ¨ Check for kinks in the tubing and straighten them out  ¨ Check the tape or strap used to secure the catheter tube to your skin  Make sure it is not blocking the tube  ¨ Make sure you are not sitting or lying on the tubing  ¨ Make sure the urine bag is hanging below the level of your waist     · Urine leaks from or around the catheter, tubing, or drainage bag:  Check if the closed drainage system has accidently come open or apart  Clean the catheter and tubing ends with a new alcohol pad and reconnect them  Prevent an infection:   · Wash your hands often  Wash before and after you touch your catheter, tubing, or drainage bag  Use soap and water  Wear clean disposable gloves when you care for your catheter or disconnect the drainage bag   Wash your hands before you prepare or eat food  · Drink liquids as directed  Ask your healthcare provider how much liquid to drink each day and which liquids are best for you  Liquids will help flush your kidneys and bladder to help prevent infection  Follow up with your healthcare provider as directed:  Write down your questions so you remember to ask them during your visits  © 2017 [address] [employer] Information is for End User's use only and may not be sold, redistributed or otherwise used for commercial purposes  All illustrations and images included in CareNotes® are the copyrighted property of [employer]  or [employer]  The above information is an  only  It is not intended as medical advice for individual conditions or treatments  Talk to your doctor, nurse or pharmacist before following any medical regimen to see if it is safe and effective for you

## 2020-09-10 NOTE — ANESTHESIA POSTPROCEDURE EVALUATION
Post-Op Assessment Note    CV Status:  Stable  Pain Score: 0    Pain management: adequate     Mental Status:  Alert and awake   Hydration Status:  Euvolemic   PONV Controlled:  Controlled   Airway Patency:  Patent      Post Op Vitals Reviewed: Yes      Staff: Anesthesiologist, CRNA         No complications documented      /72 (09/10/20 0849)    Temp 98 6 °F (37 °C) (09/10/20 0849)    Pulse 76 (09/10/20 0849)   Resp 16 (09/10/20 0849)    SpO2 99 % (09/10/20 0849)

## 2020-09-10 NOTE — PROGRESS NOTES
The patient was seen in the recovery room  Findings at surgery were discussed I did recommend that we proceed with the use of intravesical  mitomycin-C  He did gave consent  Mitomycin-C was ordered from the pharmacy and was instilled via Goins catheter into the bladder in the PACU  The procedure was well tolerated  The catheter was clamped  We will leave the mitomycin indwelling for 30 minutes  The bladder can then be drained  We will plan to discharge the patient home with Goins catheter in place and plan removal tomorrow in the office

## 2020-09-10 NOTE — PERIOPERATIVE NURSING NOTE
Awake   Deinies pain  Reports of feeling the need to urinate  SBP 200s  Dr Salomon Barron notified  New order on chart  Medicated with Fentanyl IV and Pyridium PO,  SBP decreased to 180s without Labetalol IV  Will continue to monitor  Pt reports feeling better

## 2020-09-10 NOTE — INTERVAL H&P NOTE
H&P reviewed  After examining the patient I find no changes in the patients condition since the H&P had been written  Vitals:    09/10/20 0616   BP: 170/66   Pulse: 63   Resp: 20   Temp: 98 2 °F (36 8 °C)   SpO2: 97%   Procedure reviewed with patient in the holding unit  Risks discussed and consent signed   Will add retrogrades as cytology was +

## 2020-09-10 NOTE — ANESTHESIA POSTPROCEDURE EVALUATION
Post-Op Assessment Note    CV Status:  Stable  Pain Score: 2    Pain management: adequate     Mental Status:  Alert and awake   Hydration Status:  Euvolemic   PONV Controlled:  Controlled   Airway Patency:  Patent      Post Op Vitals Reviewed: Yes      Staff: Anesthesiologist, CRNA   Comments: LMA without post op issues   prolonged pacu stay due to persistent elevated BP  greater than 180 syst        No complications documented      BP     Temp     Pulse     Resp      SpO2

## 2020-09-10 NOTE — TELEPHONE ENCOUNTER
Patient has cystoscopy with biopsy and fulguration of bladder tumor today  He was discharged home with a Goins catheter which can be removed tomorrow September 11th  Please call to arrange this and also arrange a follow-up visit in approximately 2 weeks to review his pathology

## 2020-09-10 NOTE — OP NOTE
OPERATIVE REPORT  PATIENT NAME: Vivi Cintron    :  1940  MRN: [id number]  Pt Location:  OR ROOM 10    SURGERY DATE: 9/10/2020    Surgeon(s) and Role:     * Ruby Mcnamara MD - Primary    Preop Diagnosis:  Malignant neoplasm of lateral wall of urinary bladder ([facility] [address] ) [C67 2]    Post-Op Diagnosis Codes:     * Malignant neoplasm of anterior wall of bladder ([address] ) [C67 3]    Procedure(s) (LRB):  CYSTOSCOPY, COLLECTION OF LEFT KIDNEY CYTOLOGY, BILATERAL RETROGRADE PYELOGRAM, BLADDER WALL BIOPSIES  AND FULGERAION, RANDOM BLADDER TUMOR BIOPSIES (N/A)    Specimen(s):  ID Type Source Tests Collected by Time Destination   1 : CYTOLOGY LEFT KIDNEY Other Soft Tissue, Other NON-GYNECOLOGIC CYTOLOGY Ruby Mcnamara MD 9/10/2020 2774    2 : ANTERIOR BLADDER WALL  Tissue Soft Tissue, Other TISSUE EXAM Ruby Mcnamara MD 9/10/2020 0809    3 : RIGHT BLADDER WALL SCAR Tissue Soft Tissue, Other TISSUE EXAM Ruby Mcnamara MD 9/10/2020 6066    4 : POSTERIOR BLADDER WALL (RANDOM BIOPSY) Tissue Soft Tissue, Other TISSUE EXAM Ruby Mcnamara MD 9/10/2020 0032    5 : LEFT BLADDER WALL (RANDOM BIOPSY) Tissue Soft Tissue, Other TISSUE EXAM Ruby Mcnamara MD 9/10/2020 8963        Estimated Blood Loss:   Minimal    Drains:  Urethral Catheter Latex;Straight-tip 20 Fr  (Active)   Number of days: 0       Anesthesia Type:   General    Operative Indications:  Malignant neoplasm of lateral wall of urinary bladder ([facility]) [C67 2]  Positive urine cytology    Operative Findings:  Normal urethra with apical Prostatic obstruction and an open bladder neck  Ureteral orifices are normal   The bladder is mild to moderately trabeculated  A papillary bladder tumor is noted on the anterior bladder wall near the bladder neck  No other lesions were seen  Bilateral retrograde pyelography was unremarkable  I was unable to collect cytology from the right collecting system however cytology was collected from the left collecting system    The bladder tumor seen was approximately 1 1/2 cm in size  This lesion was biopsied and then fulgurated completely  No other definite lesion was seen  Biopsy of the mucosa of a right lateral scar was performed  Random biopsies were performed as well  A 20 [place] Goins catheter was placed at the conclusion the procedure  Mitomycin-C will be instilled in the recovery room  Complications:   None    Procedure and Technique:  The patient was brought to the operating properly identified  General anesthesia was administered  He was placed in lithotomy position prepped and draped in usual sterile fashion  Compression boots were employed  Intravenous antibiotics administered  An appropriate time-out was performed  Cystourethroscopy was performed with 25 [place] cystoscope with findings as above  A tiger tail catheter was easily placed up the right collecting system and attempts made to perform a bojorquez tot cytology  I could not get adequate drainage to collect a sample and then proceeded to perform a right retrograde pyelogram with dilute Omnipaque  No filling defect was seen  A new tiger tail was then placed up the left side and bojorquez type cytology performed on that side  After the cytology been collected a retrograde pyelogram was performed on the left  No filling defect was identified  Next the rigid and flexible biopsy forceps were utilized to biopsy the bladder lesion on the anterior bladder wall near the bladder neck  Attempts were made to take superficial and deep biopsies  Once biopsies were taken the Bugbee electrode was utilized to completely fulgurate the area as well as the surrounding mucosa  No tumor remained at the conclusion the procedure  Random biopsies were then performed in an area of previous scar on the right lateral wall  Biopsies were also taken in a random fashion on the posterior bladder wall and left bladder wall  All biopsy sites were then fulgurated    A 20 [place] Goins catheter was then placed into the bladder with a stylet  Catheter irrigated well and drained clear  The patient tolerated procedure well  Blood loss was minimal   We will plan mitomycin-C instillation in the recovery room  The patient was awakened from anesthesia and taken to the recovery room in satisfactory condition     I was present for the entire procedure    Patient Disposition:  PACU     SIGNATURE: Roseline Perez MD  DATE: September 10, 2020  TIME: 8:51 AM

## 2020-09-10 NOTE — TELEPHONE ENCOUNTER
Call placed to patient and left a detailed message on answering machine as per signed communication consent form  Informed patient in the message of appointment that is scheduled for tomorrow with nurse for sweet catheter removal  Advised patient in the message that if this time does not work for him to please contact the office in regards to rescheduling  Office number was provided for call back at this time

## 2020-09-10 NOTE — PERIOPERATIVE NURSING NOTE
Mitomycin instilled at 0908 by Dr Asya Light  Clamp opened at 0938 by myself,  Positive return flow

## 2020-09-11 ENCOUNTER — TELEPHONE (OUTPATIENT)
Dept: INTERNAL MEDICINE CLINIC | Facility: CLINIC | Age: 80
End: 2020-09-11

## 2020-09-11 ENCOUNTER — CLINICAL SUPPORT (OUTPATIENT)
Dept: UROLOGY | Facility: MEDICAL CENTER | Age: 80
End: 2020-09-11
Payer: MEDICARE

## 2020-09-11 VITALS
WEIGHT: 184 LBS | HEART RATE: 60 BPM | HEIGHT: 68 IN | DIASTOLIC BLOOD PRESSURE: 82 MMHG | SYSTOLIC BLOOD PRESSURE: 160 MMHG | BODY MASS INDEX: 27.89 KG/M2

## 2020-09-11 DIAGNOSIS — C67.2 MALIGNANT NEOPLASM OF LATERAL WALL OF URINARY BLADDER (HCC): Primary | ICD-10-CM

## 2020-09-11 DIAGNOSIS — N40.1 BPH WITH OBSTRUCTION/LOWER URINARY TRACT SYMPTOMS: ICD-10-CM

## 2020-09-11 DIAGNOSIS — C67.5 MALIGNANT NEOPLASM OF URINARY BLADDER NECK (HCC): ICD-10-CM

## 2020-09-11 DIAGNOSIS — N13.8 BPH WITH OBSTRUCTION/LOWER URINARY TRACT SYMPTOMS: ICD-10-CM

## 2020-09-11 PROCEDURE — 99211 OFF/OP EST MAY X REQ PHY/QHP: CPT

## 2020-09-11 NOTE — PROGRESS NOTES
9/11/2020    Mike Starkey is a [de-identified] y o  male  [id number]    Diagnosis:  Chief Complaint     Bladder Cancer; Goins Catheter Removal        Patient presents for Goins Catheter removal s/p CYSTOSCOPY, COLLECTION OF LEFT KIDNEY CYTOLOGY, BILATERAL RETROGRADE PYELOGRAM, BLADDER WALL BIOPSIES  AND FULGERAION, RANDOM BLADDER TUMOR BIOPSIES  9/10/20 with Dr Jonh Hdz  Plan:  Return in 10 days for follow up and results of bladder bx  Procedure: Goins removed without difficulty, patient tolerated procedure well  Urine draining clear orange  Denies fever or urinary symptoms  Patient instructed to increase fluids, especially water, and limit caffeine intake  To return to office if unable to void within 4-6 hours, or has increased discomfort      Vitals:    09/11/20 0801   BP: 160/82   Pulse: 60   Weight: 83 5 kg (184 lb)   Height: 5' 8" (1 727 m)       Jaylon Torres LPN

## 2020-09-11 NOTE — TELEPHONE ENCOUNTER
Pt would like to xel his CT scan scheduled for 9/15/20  He would like to affirm that this is an okay thing to do? Could you address on Dr Falcon's behalf? Pt can be reached at [phone]      Thank you

## 2020-09-11 NOTE — TELEPHONE ENCOUNTER
Not sure how important the CT scan is since it is scheduled for Tuesday Dr Georgie Isabel will be back on Monday patient should contact him on Monday to discuss it thank you

## 2020-09-11 NOTE — TELEPHONE ENCOUNTER
Dr Falcon,    Can you please address pt's concerns about xeling CT scan on Monday? Pt would appreciate a call back at [phone]  Thank you!

## 2020-09-14 LAB — MYCOBACTERIUM BLD CULT: NORMAL

## 2020-09-14 NOTE — PROGRESS NOTES
Patient is canceled the CT scan  The lesion is decreasing in size  Will call if it has not completely resolved

## 2020-09-21 ENCOUNTER — OFFICE VISIT (OUTPATIENT)
Dept: UROLOGY | Facility: MEDICAL CENTER | Age: 80
End: 2020-09-21
Payer: MEDICARE

## 2020-09-21 VITALS
DIASTOLIC BLOOD PRESSURE: 86 MMHG | SYSTOLIC BLOOD PRESSURE: 150 MMHG | HEIGHT: 68 IN | TEMPERATURE: 96.9 F | WEIGHT: 184 LBS | BODY MASS INDEX: 27.89 KG/M2 | HEART RATE: 56 BPM

## 2020-09-21 DIAGNOSIS — C67.8 MALIGNANT NEOPLASM OF OVERLAPPING SITES OF BLADDER (HCC): ICD-10-CM

## 2020-09-21 DIAGNOSIS — C67.5 MALIGNANT NEOPLASM OF URINARY BLADDER NECK (HCC): Primary | ICD-10-CM

## 2020-09-21 PROCEDURE — 99214 OFFICE O/P EST MOD 30 MIN: CPT | Performed by: UROLOGY

## 2020-09-21 NOTE — PROGRESS NOTES
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  Cytology from the left collecting system is suspicious  I was unable to obtain cytology from the right  Retrograde studies are negative  I had a long discussion with the patient today regarding treatment options  He has failed 1 course of BCG as well as immunotherapy  Options at this point I feel include another course of BCG perhaps with interferon, or radical cystectomy with ileal loop urinary diversion  We discussed the pros and cons of each as well as the risk of progression  At the conclusion were discussion I recommended to the patient that we obtain a CT scan of the abdomen pelvis  Unfortunately he does have chronic renal insufficiency and history of AK I  We will obtain the CT without intravenous contrast   We will present the patient at multidisciplinary tumor board  We will then be in a better position to discuss his options for therapy  25 minutes was spent face-to-face discussion regarding the above

## 2020-09-22 ENCOUNTER — HOSPITAL ENCOUNTER (OUTPATIENT)
Dept: RADIOLOGY | Facility: HOSPITAL | Age: 80
Discharge: HOME | End: 2020-09-22
Attending: UROLOGY
Payer: MEDICARE

## 2020-09-22 DIAGNOSIS — C67.5 MALIGNANT NEOPLASM OF URINARY BLADDER NECK (HCC): ICD-10-CM

## 2020-09-22 PROCEDURE — 74176 CT ABD & PELVIS W/O CONTRAST: CPT

## 2020-09-22 PROCEDURE — G1004 CDSM NDSC: HCPCS

## 2020-09-24 ENCOUNTER — TELEPHONE (OUTPATIENT)
Dept: UROLOGY | Facility: AMBULATORY SURGERY CENTER | Age: 80
End: 2020-09-24

## 2020-09-24 NOTE — TELEPHONE ENCOUNTER
Patient managed by Toya Menchaca has appointment with Dr Lorenza Lopez at [address]  Marylen Cool called to ask for records and office notes including cysto, mri and cat scans done  Patient has appointment tomorrow

## 2020-09-24 NOTE — TELEPHONE ENCOUNTER
Retrieved historical [facility]Chart/[facility] records & Epic records per Ioana's request, continuity of care  Records faxed to Ioana's attention, for scheduled appointment with Dr Jose Guardado at [facility] [address] 9/25/2020

## 2020-10-06 ENCOUNTER — DOCUMENTATION (OUTPATIENT)
Dept: UROLOGY | Facility: AMBULATORY SURGERY CENTER | Age: 80
End: 2020-10-06

## 2020-10-10 ENCOUNTER — TELEPHONE (OUTPATIENT)
Dept: OTHER | Facility: OTHER | Age: 80
End: 2020-10-10

## 2020-10-17 DIAGNOSIS — E11.9 TYPE 2 DIABETES MELLITUS WITHOUT COMPLICATION, WITHOUT LONG-TERM CURRENT USE OF INSULIN (HCC): ICD-10-CM

## 2020-10-19 RX ORDER — METFORMIN HYDROCHLORIDE 500 MG/1
TABLET, EXTENDED RELEASE ORAL
Qty: 360 TABLET | Refills: 0 | Status: SHIPPED | OUTPATIENT
Start: 2020-10-19 | End: 2021-07-05

## 2020-11-09 ENCOUNTER — TRANSITIONAL CARE MANAGEMENT (OUTPATIENT)
Dept: INTERNAL MEDICINE CLINIC | Facility: CLINIC | Age: 80
End: 2020-11-09

## 2020-11-10 ENCOUNTER — TELEPHONE (OUTPATIENT)
Dept: INTERNAL MEDICINE CLINIC | Facility: CLINIC | Age: 80
End: 2020-11-10

## 2020-11-20 ENCOUNTER — OFFICE VISIT (OUTPATIENT)
Dept: INTERNAL MEDICINE CLINIC | Facility: CLINIC | Age: 80
End: 2020-11-20
Payer: MEDICARE

## 2020-11-20 VITALS
BODY MASS INDEX: 26.52 KG/M2 | HEART RATE: 70 BPM | WEIGHT: 175 LBS | TEMPERATURE: 96 F | SYSTOLIC BLOOD PRESSURE: 142 MMHG | OXYGEN SATURATION: 98 % | DIASTOLIC BLOOD PRESSURE: 60 MMHG | HEIGHT: 68 IN

## 2020-11-20 DIAGNOSIS — I25.10 CORONARY ARTERY DISEASE INVOLVING NATIVE CORONARY ARTERY OF NATIVE HEART WITHOUT ANGINA PECTORIS: ICD-10-CM

## 2020-11-20 DIAGNOSIS — C67.8 MALIGNANT NEOPLASM OF OVERLAPPING SITES OF BLADDER (HCC): Primary | ICD-10-CM

## 2020-11-20 DIAGNOSIS — I10 BENIGN ESSENTIAL HYPERTENSION: ICD-10-CM

## 2020-11-20 DIAGNOSIS — E11.59 TYPE 2 DIABETES MELLITUS WITH OTHER CIRCULATORY COMPLICATION, WITHOUT LONG-TERM CURRENT USE OF INSULIN (HCC): ICD-10-CM

## 2020-11-20 PROBLEM — N40.0 BPH WITHOUT OBSTRUCTION/LOWER URINARY TRACT SYMPTOMS: Status: RESOLVED | Noted: 2019-01-10 | Resolved: 2020-11-20

## 2020-11-20 PROCEDURE — 99495 TRANSJ CARE MGMT MOD F2F 14D: CPT | Performed by: INTERNAL MEDICINE

## 2020-11-20 RX ORDER — ACETAMINOPHEN 500 MG
1000 TABLET ORAL EVERY 6 HOURS PRN
COMMUNITY
Start: 2020-10-30

## 2020-11-20 RX ORDER — IRBESARTAN 150 MG/1
150 TABLET ORAL DAILY
COMMUNITY
Start: 2020-11-06 | End: 2020-11-20

## 2020-11-20 RX ORDER — TRAMADOL HYDROCHLORIDE 50 MG/1
50 TABLET ORAL EVERY 6 HOURS PRN
COMMUNITY
Start: 2020-11-06 | End: 2021-06-23 | Stop reason: SDUPTHER

## 2020-11-20 RX ORDER — CHLORAL HYDRATE 500 MG
1000 CAPSULE ORAL DAILY
COMMUNITY
Start: 2020-10-30 | End: 2021-07-13 | Stop reason: ALTCHOICE

## 2020-11-20 RX ORDER — ASPIRIN 81 MG/1
81 TABLET, CHEWABLE ORAL DAILY
COMMUNITY
End: 2022-03-31 | Stop reason: ALTCHOICE

## 2020-11-20 RX ORDER — ATORVASTATIN CALCIUM 40 MG/1
40 TABLET, FILM COATED ORAL
COMMUNITY
Start: 2020-11-06 | End: 2020-12-08 | Stop reason: SDUPTHER

## 2020-11-25 ENCOUNTER — HOSPITAL ENCOUNTER (OUTPATIENT)
Dept: RADIOLOGY | Facility: HOSPITAL | Age: 80
Discharge: HOME | End: 2020-11-25
Payer: MEDICARE

## 2020-11-25 DIAGNOSIS — C67.8 MALIGNANT NEOPLASM OF OVERLAPPING SITES OF BLADDER (HCC): ICD-10-CM

## 2020-11-25 PROCEDURE — 74178 CT ABD&PLV WO CNTR FLWD CNTR: CPT

## 2020-11-25 PROCEDURE — 71250 CT THORAX DX C-: CPT

## 2020-11-25 RX ADMIN — IODIXANOL 100 ML: 320 INJECTION, SOLUTION INTRAVASCULAR at 14:11

## 2020-11-26 ENCOUNTER — LAB REQUISITION (OUTPATIENT)
Dept: LAB | Facility: HOSPITAL | Age: 80
End: 2020-11-26
Payer: MEDICARE

## 2020-11-26 ENCOUNTER — APPOINTMENT (OUTPATIENT)
Dept: LAB | Facility: HOSPITAL | Age: 80
End: 2020-11-26
Attending: INTERNAL MEDICINE
Payer: MEDICARE

## 2020-11-26 DIAGNOSIS — E11.59 TYPE 2 DIABETES MELLITUS WITH OTHER CIRCULATORY COMPLICATION, WITHOUT LONG-TERM CURRENT USE OF INSULIN (HCC): ICD-10-CM

## 2020-11-26 DIAGNOSIS — I10 BENIGN ESSENTIAL HYPERTENSION: ICD-10-CM

## 2020-11-26 DIAGNOSIS — C67.9 MALIGNANT NEOPLASM OF BLADDER, UNSPECIFIED (HCC): ICD-10-CM

## 2020-11-26 DIAGNOSIS — C67.8 MALIGNANT NEOPLASM OF OVERLAPPING SITES OF BLADDER (HCC): ICD-10-CM

## 2020-11-26 LAB
ALBUMIN SERPL BCP-MCNC: 3.2 G/DL (ref 3.5–5)
ALP SERPL-CCNC: 94 U/L (ref 46–116)
ALT SERPL W P-5'-P-CCNC: 24 U/L (ref 12–78)
ANION GAP SERPL CALCULATED.3IONS-SCNC: 5 MMOL/L (ref 4–13)
ANION GAP SERPL CALCULATED.3IONS-SCNC: 5 MMOL/L (ref 4–13)
AST SERPL W P-5'-P-CCNC: 17 U/L (ref 5–45)
BASOPHILS # BLD AUTO: 0.06 THOUSANDS/ΜL (ref 0–0.1)
BASOPHILS NFR BLD AUTO: 1 % (ref 0–1)
BILIRUB SERPL-MCNC: 0.48 MG/DL (ref 0.2–1)
BUN SERPL-MCNC: 32 MG/DL (ref 5–25)
BUN SERPL-MCNC: 33 MG/DL (ref 5–25)
CALCIUM ALBUM COR SERPL-MCNC: 10.7 MG/DL (ref 8.3–10.1)
CALCIUM SERPL-MCNC: 10.1 MG/DL (ref 8.3–10.1)
CALCIUM SERPL-MCNC: 10.2 MG/DL (ref 8.3–10.1)
CHLORIDE SERPL-SCNC: 113 MMOL/L (ref 100–108)
CHLORIDE SERPL-SCNC: 114 MMOL/L (ref 100–108)
CO2 SERPL-SCNC: 19 MMOL/L (ref 21–32)
CO2 SERPL-SCNC: 20 MMOL/L (ref 21–32)
CREAT SERPL-MCNC: 1.19 MG/DL (ref 0.6–1.3)
CREAT SERPL-MCNC: 1.23 MG/DL (ref 0.6–1.3)
EOSINOPHIL # BLD AUTO: 0.53 THOUSAND/ΜL (ref 0–0.61)
EOSINOPHIL NFR BLD AUTO: 5 % (ref 0–6)
ERYTHROCYTE [DISTWIDTH] IN BLOOD BY AUTOMATED COUNT: 15.4 % (ref 11.6–15.1)
EST. AVERAGE GLUCOSE BLD GHB EST-MCNC: 123 MG/DL
GFR SERPL CREATININE-BSD FRML MDRD: 55 ML/MIN/1.73SQ M
GFR SERPL CREATININE-BSD FRML MDRD: 57 ML/MIN/1.73SQ M
GLUCOSE P FAST SERPL-MCNC: 116 MG/DL (ref 65–99)
GLUCOSE P FAST SERPL-MCNC: 117 MG/DL (ref 65–99)
HBA1C MFR BLD: 5.9 %
HCT VFR BLD AUTO: 36.8 % (ref 36.5–49.3)
HGB BLD-MCNC: 12 G/DL (ref 12–17)
IMM GRANULOCYTES # BLD AUTO: 0.06 THOUSAND/UL (ref 0–0.2)
IMM GRANULOCYTES NFR BLD AUTO: 1 % (ref 0–2)
LYMPHOCYTES # BLD AUTO: 1.36 THOUSANDS/ΜL (ref 0.6–4.47)
LYMPHOCYTES NFR BLD AUTO: 13 % (ref 14–44)
MCH RBC QN AUTO: 31.6 PG (ref 26.8–34.3)
MCHC RBC AUTO-ENTMCNC: 32.6 G/DL (ref 31.4–37.4)
MCV RBC AUTO: 97 FL (ref 82–98)
MONOCYTES # BLD AUTO: 0.81 THOUSAND/ΜL (ref 0.17–1.22)
MONOCYTES NFR BLD AUTO: 8 % (ref 4–12)
NEUTROPHILS # BLD AUTO: 7.97 THOUSANDS/ΜL (ref 1.85–7.62)
NEUTS SEG NFR BLD AUTO: 72 % (ref 43–75)
NRBC BLD AUTO-RTO: 0 /100 WBCS
PLATELET # BLD AUTO: 188 THOUSANDS/UL (ref 149–390)
PMV BLD AUTO: 12.6 FL (ref 8.9–12.7)
POTASSIUM SERPL-SCNC: 5.3 MMOL/L (ref 3.5–5.3)
POTASSIUM SERPL-SCNC: 5.4 MMOL/L (ref 3.5–5.3)
PROT SERPL-MCNC: 6.6 G/DL (ref 6.4–8.2)
RBC # BLD AUTO: 3.8 MILLION/UL (ref 3.88–5.62)
SODIUM SERPL-SCNC: 138 MMOL/L (ref 136–145)
SODIUM SERPL-SCNC: 138 MMOL/L (ref 136–145)
WBC # BLD AUTO: 10.79 THOUSAND/UL (ref 4.31–10.16)

## 2020-11-26 PROCEDURE — 85025 COMPLETE CBC W/AUTO DIFF WBC: CPT

## 2020-11-26 PROCEDURE — 80053 COMPREHEN METABOLIC PANEL: CPT | Performed by: INTERNAL MEDICINE

## 2020-11-26 PROCEDURE — 36415 COLL VENOUS BLD VENIPUNCTURE: CPT

## 2020-11-26 PROCEDURE — 83036 HEMOGLOBIN GLYCOSYLATED A1C: CPT

## 2020-11-26 PROCEDURE — 80048 BASIC METABOLIC PNL TOTAL CA: CPT

## 2020-12-03 ENCOUNTER — TELEPHONE (OUTPATIENT)
Dept: INTERNAL MEDICINE CLINIC | Facility: CLINIC | Age: 80
End: 2020-12-03

## 2020-12-03 DIAGNOSIS — B36.9 FUNGAL DERMATITIS: Primary | ICD-10-CM

## 2020-12-03 RX ORDER — NYSTATIN 100000 [USP'U]/G
POWDER TOPICAL 3 TIMES DAILY
Qty: 30 G | Refills: 3 | Status: SHIPPED | OUTPATIENT
Start: 2020-12-03

## 2020-12-03 NOTE — ASSESSMENT & PLAN NOTE
I had a call from the visiting nurse  States that the patient is has developed some irritation around catheter site  She feels this may be a fungal dermatitis  Is asking that we send a prescription in for Mycostatin powder to the pharmacy  Prescription was sent

## 2020-12-08 DIAGNOSIS — I25.10 CORONARY ARTERY DISEASE INVOLVING NATIVE CORONARY ARTERY OF NATIVE HEART WITHOUT ANGINA PECTORIS: Primary | ICD-10-CM

## 2020-12-08 RX ORDER — ATORVASTATIN CALCIUM 40 MG/1
40 TABLET, FILM COATED ORAL
Qty: 90 TABLET | Refills: 3 | Status: SHIPPED | OUTPATIENT
Start: 2020-12-08 | End: 2022-03-07

## 2020-12-22 ENCOUNTER — OFFICE VISIT (OUTPATIENT)
Dept: INTERNAL MEDICINE CLINIC | Facility: CLINIC | Age: 80
End: 2020-12-22
Payer: MEDICARE

## 2020-12-22 VITALS
OXYGEN SATURATION: 97 % | HEART RATE: 67 BPM | BODY MASS INDEX: 25.16 KG/M2 | HEIGHT: 68 IN | WEIGHT: 166 LBS | SYSTOLIC BLOOD PRESSURE: 124 MMHG | DIASTOLIC BLOOD PRESSURE: 72 MMHG | TEMPERATURE: 97.6 F

## 2020-12-22 DIAGNOSIS — N40.0 BPH WITHOUT OBSTRUCTION/LOWER URINARY TRACT SYMPTOMS: ICD-10-CM

## 2020-12-22 DIAGNOSIS — F41.9 ANXIETY AND DEPRESSION: ICD-10-CM

## 2020-12-22 DIAGNOSIS — F32.A ANXIETY AND DEPRESSION: ICD-10-CM

## 2020-12-22 DIAGNOSIS — N18.32 STAGE 3B CHRONIC KIDNEY DISEASE (HCC): ICD-10-CM

## 2020-12-22 DIAGNOSIS — E11.3293 TYPE 2 DIABETES MELLITUS WITH BOTH EYES AFFECTED BY MILD NONPROLIFERATIVE RETINOPATHY WITHOUT MACULAR EDEMA, WITHOUT LONG-TERM CURRENT USE OF INSULIN (HCC): ICD-10-CM

## 2020-12-22 DIAGNOSIS — E11.9 TYPE 2 DIABETES MELLITUS WITHOUT COMPLICATION, WITHOUT LONG-TERM CURRENT USE OF INSULIN (HCC): ICD-10-CM

## 2020-12-22 DIAGNOSIS — R09.82 POSTNASAL DRIP: Primary | ICD-10-CM

## 2020-12-22 DIAGNOSIS — C67.8 MALIGNANT NEOPLASM OF OVERLAPPING SITES OF BLADDER (HCC): ICD-10-CM

## 2020-12-22 PROBLEM — E66.3 OVERWEIGHT: Status: ACTIVE | Noted: 2020-12-22

## 2020-12-22 PROCEDURE — 99214 OFFICE O/P EST MOD 30 MIN: CPT | Performed by: INTERNAL MEDICINE

## 2020-12-22 RX ORDER — FLUTICASONE PROPIONATE 50 MCG
2 SPRAY, SUSPENSION (ML) NASAL DAILY
Qty: 16 G | Refills: 3 | Status: SHIPPED | OUTPATIENT
Start: 2020-12-22

## 2020-12-22 RX ORDER — CITALOPRAM 10 MG/1
10 TABLET ORAL DAILY
Qty: 30 TABLET | Refills: 5 | Status: SHIPPED | OUTPATIENT
Start: 2020-12-22 | End: 2021-07-13 | Stop reason: SDUPTHER

## 2020-12-23 ENCOUNTER — TELEPHONE (OUTPATIENT)
Dept: INTERNAL MEDICINE CLINIC | Facility: CLINIC | Age: 80
End: 2020-12-23

## 2021-01-08 ENCOUNTER — TELEPHONE (OUTPATIENT)
Dept: UROLOGY | Facility: MEDICAL CENTER | Age: 81
End: 2021-01-08

## 2021-01-08 NOTE — TELEPHONE ENCOUNTER
This is a patient of Dr Jasmeet Machado in [facility]  They are asking for verbal order for supplies  Please call her back at [phone]

## 2021-01-11 NOTE — TELEPHONE ENCOUNTER
Call placed to Dilia Ramos at [facility] and spoke with her  Informed her that according to patient's chart, he is under the Urological care of Dr Norbert Salcedo at [address] [place] [address]  They are managing her catheter supplies at this time  She will reach out to their office at this time for catheter orders and authorization

## 2021-01-15 ENCOUNTER — TRANSCRIBE ORDERS (OUTPATIENT)
Dept: LAB | Facility: CLINIC | Age: 81
End: 2021-01-15

## 2021-01-18 ENCOUNTER — OFFICE VISIT (OUTPATIENT)
Dept: INTERNAL MEDICINE CLINIC | Facility: CLINIC | Age: 81
End: 2021-01-18
Payer: MEDICARE

## 2021-01-18 VITALS
OXYGEN SATURATION: 92 % | DIASTOLIC BLOOD PRESSURE: 66 MMHG | BODY MASS INDEX: 24.25 KG/M2 | TEMPERATURE: 97.4 F | HEIGHT: 68 IN | HEART RATE: 58 BPM | WEIGHT: 160 LBS | SYSTOLIC BLOOD PRESSURE: 118 MMHG

## 2021-01-18 DIAGNOSIS — C67.8 MALIGNANT NEOPLASM OF OVERLAPPING SITES OF BLADDER (HCC): ICD-10-CM

## 2021-01-18 DIAGNOSIS — E11.3293 TYPE 2 DIABETES MELLITUS WITH BOTH EYES AFFECTED BY MILD NONPROLIFERATIVE RETINOPATHY WITHOUT MACULAR EDEMA, WITHOUT LONG-TERM CURRENT USE OF INSULIN (HCC): ICD-10-CM

## 2021-01-18 DIAGNOSIS — E11.59 TYPE 2 DIABETES MELLITUS WITH OTHER CIRCULATORY COMPLICATION, WITHOUT LONG-TERM CURRENT USE OF INSULIN (HCC): ICD-10-CM

## 2021-01-18 DIAGNOSIS — I10 BENIGN ESSENTIAL HYPERTENSION: ICD-10-CM

## 2021-01-18 DIAGNOSIS — I25.10 CORONARY ARTERY DISEASE INVOLVING NATIVE CORONARY ARTERY OF NATIVE HEART WITHOUT ANGINA PECTORIS: ICD-10-CM

## 2021-01-18 DIAGNOSIS — F41.9 ANXIETY AND DEPRESSION: Primary | ICD-10-CM

## 2021-01-18 DIAGNOSIS — D49.4 BLADDER TUMOR: ICD-10-CM

## 2021-01-18 DIAGNOSIS — N18.32 STAGE 3B CHRONIC KIDNEY DISEASE (HCC): ICD-10-CM

## 2021-01-18 DIAGNOSIS — F32.A ANXIETY AND DEPRESSION: Primary | ICD-10-CM

## 2021-01-18 PROCEDURE — 99214 OFFICE O/P EST MOD 30 MIN: CPT | Performed by: INTERNAL MEDICINE

## 2021-01-18 RX ORDER — CITALOPRAM 20 MG/1
20 TABLET ORAL DAILY
Qty: 30 TABLET | Refills: 3 | Status: SHIPPED | OUTPATIENT
Start: 2021-01-18 | End: 2021-07-19

## 2021-01-18 RX ORDER — LANCETS 33 GAUGE
EACH MISCELLANEOUS
COMMUNITY
Start: 2020-12-23 | End: 2021-06-01

## 2021-01-18 RX ORDER — BLOOD-GLUCOSE METER
EACH MISCELLANEOUS
COMMUNITY
Start: 2020-12-23 | End: 2021-06-01

## 2021-01-18 RX ORDER — BLOOD SUGAR DIAGNOSTIC
STRIP MISCELLANEOUS DAILY
COMMUNITY
Start: 2020-12-23 | End: 2021-04-07

## 2021-01-18 NOTE — ASSESSMENT & PLAN NOTE
Patient's wife states that there has been no significant changes with the patient's emotional states since placed on Celexa  10 mg daily  Decision today will be to double the dose to 20 mg a day period will follow-up with the patient in 6-8 weeks

## 2021-01-18 NOTE — PROGRESS NOTES
Assessment/Plan:    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)   Despite the fact the patient does have a quite mint available in order to check his blood sugars he has not been checking them since his last visit in our office 1 month ago  We did tell the patient the importance of this especially in light of the fact he still remains on medication for this  We have decided today because of his continued problems with weight loss to reduce his metformin to 1 pill twice a day instead of 2 pills twice a day  He continues to have problems with his appetite and continues to lose weight  He was given a slip to check on a fasting blood sugar, hemoglobin A1c hopefully within the next few days  Lab Results   Component Value Date    HGBA1C 5 9 (H) 11/26/2020       Benign essential hypertension    Patient's blood pressure continues to drop  This may be secondary to the fact the patient has been losing weight, not eating and drinking properly to maintain nutritional level that is necessary after recent procedure  He denies any lightheadedness or dizziness  Would consider modification of treatment with his next visit if his blood pressure is still low, decreasing his Avapro to 150 milligrams per day    Coronary artery disease involving native coronary artery of native heart without angina pectoris   Occasionally some pressure across the anterior portion of his chest and rest   No significant chest pain or pressure with exertion  Patient will continue to follow-up with his cardiologist     Bladder tumor    Patient does have a history of bladder tumor status post resection, ileal loop diversion procedure  Apparently his urologist locally feels the patient should be following up with his surgical physician in [place], surgeon in [place] feels the patient should be following up here    Have placed a consult for him to be seen by a local urologist as soon as possible    Stage 3 chronic kidney disease ([facility] [address] )  Lab Results   Component Value Date    EGFR 55 11/26/2020    EGFR 57 11/26/2020    EGFR 44 09/01/2020    CREATININE 1 23 11/26/2020    CREATININE 1 19 11/26/2020    CREATININE 1 48 (H) 09/01/2020    patient was given a slip for labs looking specifically at his renal function  If the patient still shows continued deterioration in his kidney function would need to have consult and evaluation by Nephrology, completely stop his metformin    Anxiety and depression   Patient's wife states that there has been no significant changes with the patient's emotional states since placed on Celexa  10 mg daily  Decision today will be to double the dose to 20 mg a day period will follow-up with the patient in 6-8 weeks  Diagnoses and all orders for this visit:    Anxiety and depression  -     citalopram (CeleXA) 20 mg tablet; Take 1 tablet (20 mg total) by mouth daily    Benign essential hypertension  -     Basic metabolic panel; Future  -     CBC and differential; Future    Type 2 diabetes mellitus with both eyes affected by mild nonproliferative retinopathy without macular edema, without long-term current use of insulin (HCC)  -     Hemoglobin A1C; Future  -     CBC and differential; Future    Malignant neoplasm of overlapping sites of bladder [facility])  -     Ambulatory referral to Urology;  Future  -     CBC and differential; Future    Type 2 diabetes mellitus with other circulatory complication, without long-term current use of insulin (HCC)    Coronary artery disease involving native coronary artery of native heart without angina pectoris    Bladder tumor    Stage 3b chronic kidney disease    Other orders  -     Lancets (OneTouch Delica Plus [id number]) MISC; TEST BLOOD GLUCOSE ONCE DAILY  -     OneTouch Verio test strip; daily Test  -     Blood Glucose Monitoring Suppl (OneTouch Verio Flex System) w/Device KIT; USE TO TEST BLOOD GLUCOSE DAILY          Subjective: Patient ID: Jae Eisenmenger is a [de-identified] y o  male  Patient is an 79-year-old male history medical problems as outlined previously  Patient over the past few months has undergone surgical procedure, resection of his bladder secondary to cancer, ileal loop diversion period since the procedure patient has had a continued decline in his status  Has been losing weight  Wife states he seems more depressed and anxious  Was to have some labs performed prior to the visit today but the labs states that these could not be performed because they were ordered by a physician with the patient no longer sees   The following portions of the patient's history were reviewed and updated as appropriate: He  has a past medical history of Acute kidney injury ([address] ), Arthritis, Wright esophagus, BPH with obstruction/lower urinary tract symptoms, CAD (coronary artery disease), Cancer ([address] ), Cataract, acquired, Coronary artery disease, Diabetes mellitus ([address] ), Diabetic neuropathy ([address] ), Enlarged prostate with lower urinary tract symptoms (LUTS), Erectile dysfunction, GERD (gastroesophageal reflux disease), Hemoptysis, Hypercholesterolemia, Hypertension, Macular degeneration, Myocardial infarction ([address] ) (1998), OAB (overactive bladder), Testicular hypofunction, Testicular hypogonadism, Tinnitus, Umbilical hernia, Urge incontinence of urine, and Wears glasses    He   Patient Active Problem List    Diagnosis Date Noted    Anxiety and depression 12/22/2020    Overweight 12/22/2020    Fungal dermatitis 12/03/2020    Stage 3 chronic kidney disease 09/09/2020    Forearm mass, left 09/03/2020    Transaminitis 07/21/2020    Liver function study, abnormal 06/25/2020    Malignant neoplasm of urinary bladder neck ([address] ) 03/24/2020    Positive urinary cytology 11/05/2019    Coronary artery disease involving native coronary artery of native heart without angina pectoris 09/09/2019    Ischemic cardiomyopathy 09/09/2019    History of bladder cancer 07/30/2019    Medicare annual wellness visit, subsequent 05/23/2019    Closed fracture of upper end of right fibula 03/11/2019    Obesity (BMI 30 0-34 9) 01/22/2019    Malignant neoplasm of overlapping sites of bladder ([facility] ) 01/10/2019    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/2019    Type 2 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 ([facility] ) 12/05/2018    Bladder tumor 11/01/2018    Overactive bladder 10/10/2018    Acute kidney injury ([place] [address] ) 05/30/2018    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    Backache 10/21/2013    Benign essential hypertension 10/11/2012    DMII (diabetes mellitus, type 2) ([place] [address] ) 10/11/2012    Hyperlipidemia 10/11/2012     He  has a past surgical history that includes pr colonoscopy flx dx w/collj spec when pfrmd (N/A, 4/25/2016); Cystoscopy (2013); Coronary artery bypass graft (07/16/2014); Colonoscopy; Inguinal hernia repair (Bilateral, 2015); Umbilical hernia repair (2012); Esophagogastroduodenoscopy; Tonsillectomy; Photodynamic Therapy; Transurethral resection of prostate (N/A, 12/20/2018); [place] retrograde pyelogram (12/20/2018); pr cystourethroscopy,fulgur <0 5 cm lesn (N/A, 12/5/2019); [place] retrograde pyelogram (12/5/2019); pr cystourethroscopy,fulgur 2-5 cm lesn (N/A, 4/16/2020); Upper gastrointestinal endoscopy; and pr cystourethroscopy,biopsy (N/A, 9/10/2020)  His family history includes Cancer in his mother; Coronary artery disease in his father; Diabetes in his father; Heart disease in his father; Hyperlipidemia in his father; Hypertension in his father; Other in his mother  He  reports that he quit smoking about 49 years ago  His smoking use included cigarettes  He has a 13 00 pack-year smoking history  He has never used smokeless tobacco  He reports current alcohol use of about 2 0 standard drinks of alcohol per week  He reports that he does not use drugs    Current Outpatient Medications   Medication Sig Dispense Refill  acetaminophen (TYLENOL) 500 mg tablet Take 1,000 mg by mouth      aspirin 81 mg chewable tablet Chew 81 mg daily      atorvastatin (LIPITOR) 40 mg tablet Take 1 tablet (40 mg total) by mouth daily at bedtime 90 tablet 3    Blood Glucose Monitoring Suppl (OneTouch Verio Flex System) w/Device KIT USE TO TEST BLOOD GLUCOSE DAILY      Cholecalciferol (VITAMIN D) 50 MCG (2000 UT) tablet Take 2,000 Units by mouth daily      citalopram (CeleXA) 10 mg tablet Take 1 tablet (10 mg total) by mouth daily 30 tablet 5    fluticasone (FLONASE) 50 mcg/act nasal spray 2 sprays into each nostril daily 16 g 3    irbesartan (AVAPRO) 300 mg tablet TAKE ONE TABLET BY MOUTH EVERY DAY 90 tablet 3    Lancets (OneTouch Delica Plus [id number]) MISC TEST BLOOD GLUCOSE ONCE DAILY      metFORMIN (GLUCOPHAGE-XR) 500 mg 24 hr tablet TAKE TWO TABLETS BY MOUTH TWICE A DAY WITH MEALS 360 tablet 0    metoprolol tartrate (LOPRESSOR) 50 mg tablet TAKE ONE TABLET BY MOUTH TWICE A  tablet 4    Multiple Vitamins-Minerals (CENTRUM SILVER 50+MEN PO) Take 1 tablet by mouth daily        Multiple Vitamins-Minerals (OCUVITE-LUTEIN PO) Take 1 tablet by mouth 2 (two) times a day Pt is taking preservision      nystatin (MYCOSTATIN) powder Apply topically 3 (three) times a day 30 g 3    Omega-3 Fatty Acids (fish oil) 1,000 mg Hold for 5 days from discharge      omeprazole (PriLOSEC) 40 MG capsule Take 40 mg by mouth daily at bedtime        OneTouch Verio test strip daily Test      traMADol (ULTRAM) 50 mg tablet Take 50 mg by mouth every 6 (six) hours as needed      vitamin E, tocopherol, 400 units capsule Take 400 Units by mouth daily      citalopram (CeleXA) 20 mg tablet Take 1 tablet (20 mg total) by mouth daily 30 tablet 3     No current facility-administered medications for this visit        Current Outpatient Medications on File Prior to Visit   Medication Sig    acetaminophen (TYLENOL) 500 mg tablet Take 1,000 mg by mouth    aspirin 81 mg chewable tablet Chew 81 mg daily    atorvastatin (LIPITOR) 40 mg tablet Take 1 tablet (40 mg total) by mouth daily at bedtime    Blood Glucose Monitoring Suppl (OneTouch Verio Flex System) w/Device KIT USE TO TEST BLOOD GLUCOSE DAILY    Cholecalciferol (VITAMIN D) 50 MCG (2000 UT) tablet Take 2,000 Units by mouth daily    citalopram (CeleXA) 10 mg tablet Take 1 tablet (10 mg total) by mouth daily    fluticasone (FLONASE) 50 mcg/act nasal spray 2 sprays into each nostril daily    irbesartan (AVAPRO) 300 mg tablet TAKE ONE TABLET BY MOUTH EVERY DAY    Lancets (OneTouch Delica Plus [id number]) MISC TEST BLOOD GLUCOSE ONCE DAILY    metFORMIN (GLUCOPHAGE-XR) 500 mg 24 hr tablet TAKE TWO TABLETS BY MOUTH TWICE A DAY WITH MEALS    metoprolol tartrate (LOPRESSOR) 50 mg tablet TAKE ONE TABLET BY MOUTH TWICE A DAY    Multiple Vitamins-Minerals (CENTRUM SILVER 50+MEN PO) Take 1 tablet by mouth daily      Multiple Vitamins-Minerals (OCUVITE-LUTEIN PO) Take 1 tablet by mouth 2 (two) times a day Pt is taking preservision    nystatin (MYCOSTATIN) powder Apply topically 3 (three) times a day    Omega-3 Fatty Acids (fish oil) 1,000 mg Hold for 5 days from discharge    omeprazole (PriLOSEC) 40 MG capsule Take 40 mg by mouth daily at bedtime      OneTouch Verio test strip daily Test    traMADol (ULTRAM) 50 mg tablet Take 50 mg by mouth every 6 (six) hours as needed    vitamin E, tocopherol, 400 units capsule Take 400 Units by mouth daily     No current facility-administered medications on file prior to visit  He is allergic to fentanyl and morphine and related       Review of Systems   Constitutional: Positive for activity change ( patient remains sedentary,), fatigue ( admits to some chronic fatigue) and unexpected weight change ( patient continues to lose weight  Decreased appetite)  Negative for appetite change, chills, diaphoresis and fever  HENT: Negative  Eyes: Negative      Respiratory: Negative  Cardiovascular: Negative  Gastrointestinal: Negative  Endocrine: Negative  Genitourinary: Negative  Musculoskeletal: Negative  Skin: Negative  Allergic/Immunologic: Negative  Neurological: Negative  Hematological: Negative  Psychiatric/Behavioral: Positive for dysphoric mood  Negative for agitation, behavioral problems, confusion, decreased concentration, hallucinations, self-injury, sleep disturbance and suicidal ideas  The patient is nervous/anxious  The patient is not hyperactive  Objective:      /66   Pulse 58   Temp (!) 97 4 °F (36 3 °C) (Tympanic)   Ht 5' 8" (1 727 m)   Wt 72 6 kg (160 lb)   SpO2 92%   BMI 24 33 kg/m²          Physical Exam  Vitals signs and nursing note reviewed  Constitutional:       General: He is not in acute distress  Appearance: He is ill-appearing  He is not toxic-appearing or diaphoretic  Comments:  Year old male who is awake alert, flat, accompanied by his wife to the appointment today, some instability with gait   HENT:      Head: Normocephalic and atraumatic  Right Ear: Tympanic membrane, ear canal and external ear normal  There is no impacted cerumen  Left Ear: Tympanic membrane, ear canal and external ear normal  There is no impacted cerumen  Nose: Nose normal  No congestion or rhinorrhea  Mouth/Throat:      Mouth: Mucous membranes are moist       Pharynx: Oropharynx is clear  No oropharyngeal exudate or posterior oropharyngeal erythema  Comments:  Slightly pale mucous membranes  Eyes:      General: No scleral icterus  Right eye: No discharge  Left eye: No discharge  Extraocular Movements: Extraocular movements intact  Conjunctiva/sclera: Conjunctivae normal       Pupils: Pupils are equal, round, and reactive to light  Neck:      Musculoskeletal: Normal range of motion and neck supple  No neck rigidity or muscular tenderness  Vascular: No carotid bruit  Cardiovascular:      Rate and Rhythm: Normal rate and regular rhythm  Pulses: Normal pulses  Heart sounds: Normal heart sounds  No murmur  No gallop  Pulmonary:      Effort: Pulmonary effort is normal  No respiratory distress  Breath sounds: Normal breath sounds  No stridor  No wheezing, rhonchi or rales  Chest:      Chest wall: No tenderness  Abdominal:      General: Bowel sounds are normal  There is no distension  Palpations: Abdomen is soft  There is no mass  Tenderness: There is no abdominal tenderness  There is no right CVA tenderness, left CVA tenderness, guarding or rebound  Hernia: No hernia is present  Musculoskeletal:         General: No swelling or tenderness  Lymphadenopathy:      Cervical: No cervical adenopathy  Skin:     General: Skin is warm  Coloration: Skin is pale  Skin is not jaundiced  Findings: No bruising, erythema, lesion or rash  Neurological:      General: No focal deficit present  Mental Status: He is alert and oriented to person, place, and time  Psychiatric:         Behavior: Behavior normal          Thought Content:  Thought content normal          Judgment: Judgment normal       Comments:  Very flat mood and affect

## 2021-01-18 NOTE — ASSESSMENT & PLAN NOTE
Occasionally some pressure across the anterior portion of his chest and rest   No significant chest pain or pressure with exertion    Patient will continue to follow-up with his cardiologist

## 2021-01-18 NOTE — ASSESSMENT & PLAN NOTE
Lab Results   Component Value Date    EGFR 55 11/26/2020    EGFR 57 11/26/2020    EGFR 44 09/01/2020    CREATININE 1 23 11/26/2020    CREATININE 1 19 11/26/2020    CREATININE 1 48 (H) 09/01/2020    patient was given a slip for labs looking specifically at his renal function    If the patient still shows continued deterioration in his kidney function would need to have consult and evaluation by Nephrology, completely stop his metformin

## 2021-01-18 NOTE — ASSESSMENT & PLAN NOTE
Patient's blood pressure continues to drop  This may be secondary to the fact the patient has been losing weight, not eating and drinking properly to maintain nutritional level that is necessary after recent procedure  He denies any lightheadedness or dizziness    Would consider modification of treatment with his next visit if his blood pressure is still low, decreasing his Avapro to 150 milligrams per day

## 2021-01-18 NOTE — ASSESSMENT & PLAN NOTE
Despite the fact the patient does have a quite mint available in order to check his blood sugars he has not been checking them since his last visit in our office 1 month ago  We did tell the patient the importance of this especially in light of the fact he still remains on medication for this  We have decided today because of his continued problems with weight loss to reduce his metformin to 1 pill twice a day instead of 2 pills twice a day  He continues to have problems with his appetite and continues to lose weight  He was given a slip to check on a fasting blood sugar, hemoglobin A1c hopefully within the next few days    Lab Results   Component Value Date    HGBA1C 5 9 (H) 11/26/2020

## 2021-01-18 NOTE — ASSESSMENT & PLAN NOTE
Patient does have a history of bladder tumor status post resection, ileal loop diversion procedure  Apparently his urologist locally feels the patient should be following up with his surgical physician in [place], surgeon in [place] feels the patient should be following up here    Have placed a consult for him to be seen by a local urologist as soon as possible

## 2021-01-25 ENCOUNTER — LAB (OUTPATIENT)
Dept: LAB | Facility: CLINIC | Age: 81
End: 2021-01-25
Payer: MEDICARE

## 2021-01-25 DIAGNOSIS — E11.3293 TYPE 2 DIABETES MELLITUS WITH BOTH EYES AFFECTED BY MILD NONPROLIFERATIVE RETINOPATHY WITHOUT MACULAR EDEMA, WITHOUT LONG-TERM CURRENT USE OF INSULIN (HCC): ICD-10-CM

## 2021-01-25 DIAGNOSIS — I10 BENIGN ESSENTIAL HYPERTENSION: ICD-10-CM

## 2021-01-25 DIAGNOSIS — C67.8 MALIGNANT NEOPLASM OF OVERLAPPING SITES OF BLADDER (HCC): ICD-10-CM

## 2021-01-25 LAB
ANION GAP SERPL CALCULATED.3IONS-SCNC: 6 MMOL/L (ref 4–13)
BASOPHILS # BLD AUTO: 0.06 THOUSANDS/ΜL (ref 0–0.1)
BASOPHILS NFR BLD AUTO: 1 % (ref 0–1)
BUN SERPL-MCNC: 49 MG/DL (ref 5–25)
CALCIUM SERPL-MCNC: 8.7 MG/DL (ref 8.3–10.1)
CHLORIDE SERPL-SCNC: 114 MMOL/L (ref 100–108)
CO2 SERPL-SCNC: 16 MMOL/L (ref 21–32)
CREAT SERPL-MCNC: 2.27 MG/DL (ref 0.6–1.3)
EOSINOPHIL # BLD AUTO: 0.24 THOUSAND/ΜL (ref 0–0.61)
EOSINOPHIL NFR BLD AUTO: 2 % (ref 0–6)
ERYTHROCYTE [DISTWIDTH] IN BLOOD BY AUTOMATED COUNT: 15.1 % (ref 11.6–15.1)
EST. AVERAGE GLUCOSE BLD GHB EST-MCNC: 123 MG/DL
GFR SERPL CREATININE-BSD FRML MDRD: 26 ML/MIN/1.73SQ M
GLUCOSE P FAST SERPL-MCNC: 125 MG/DL (ref 65–99)
HBA1C MFR BLD: 5.9 %
HCT VFR BLD AUTO: 37.3 % (ref 36.5–49.3)
HGB BLD-MCNC: 12.3 G/DL (ref 12–17)
IMM GRANULOCYTES # BLD AUTO: 0.07 THOUSAND/UL (ref 0–0.2)
IMM GRANULOCYTES NFR BLD AUTO: 1 % (ref 0–2)
LYMPHOCYTES # BLD AUTO: 1.98 THOUSANDS/ΜL (ref 0.6–4.47)
LYMPHOCYTES NFR BLD AUTO: 18 % (ref 14–44)
MCH RBC QN AUTO: 31.8 PG (ref 26.8–34.3)
MCHC RBC AUTO-ENTMCNC: 33 G/DL (ref 31.4–37.4)
MCV RBC AUTO: 96 FL (ref 82–98)
MONOCYTES # BLD AUTO: 1.23 THOUSAND/ΜL (ref 0.17–1.22)
MONOCYTES NFR BLD AUTO: 12 % (ref 4–12)
NEUTROPHILS # BLD AUTO: 7.16 THOUSANDS/ΜL (ref 1.85–7.62)
NEUTS SEG NFR BLD AUTO: 66 % (ref 43–75)
NRBC BLD AUTO-RTO: 0 /100 WBCS
PLATELET # BLD AUTO: 207 THOUSANDS/UL (ref 149–390)
PMV BLD AUTO: 11.6 FL (ref 8.9–12.7)
POTASSIUM SERPL-SCNC: 4.6 MMOL/L (ref 3.5–5.3)
RBC # BLD AUTO: 3.87 MILLION/UL (ref 3.88–5.62)
SODIUM SERPL-SCNC: 136 MMOL/L (ref 136–145)
WBC # BLD AUTO: 10.74 THOUSAND/UL (ref 4.31–10.16)

## 2021-01-25 PROCEDURE — 83036 HEMOGLOBIN GLYCOSYLATED A1C: CPT

## 2021-01-25 PROCEDURE — 85025 COMPLETE CBC W/AUTO DIFF WBC: CPT

## 2021-01-25 PROCEDURE — 80048 BASIC METABOLIC PNL TOTAL CA: CPT

## 2021-01-25 PROCEDURE — 36415 COLL VENOUS BLD VENIPUNCTURE: CPT

## 2021-02-12 ENCOUNTER — TELEPHONE (OUTPATIENT)
Dept: UROLOGY | Facility: CLINIC | Age: 81
End: 2021-02-12

## 2021-02-12 DIAGNOSIS — Z23 ENCOUNTER FOR IMMUNIZATION: ICD-10-CM

## 2021-02-12 DIAGNOSIS — C67.9 BLADDER CARCINOMA (HCC): Primary | ICD-10-CM

## 2021-02-12 DIAGNOSIS — N18.30 STAGE 3 CHRONIC KIDNEY DISEASE, UNSPECIFIED WHETHER STAGE 3A OR 3B CKD (HCC): ICD-10-CM

## 2021-02-12 NOTE — TELEPHONE ENCOUNTER
Patient called stating that he was sent a referral to see Dr Cheryle Rummage and is calling to make a Follow up appointment  Please review and call patient back with an appointment  Patient would like to go to [facility] [place] office  Thanks!

## 2021-02-12 NOTE — TELEPHONE ENCOUNTER
Patient has seen Dr Jarrett Torrez and Dr Comfort Landeros in the past besides doctors at [facility]

## 2021-02-12 NOTE — TELEPHONE ENCOUNTER
Patient with h/o bladder cancer s/p cystectomy with illeal conduit 10/26/2020 at [facility], He was sent to [facility] for second opinion by Dr Alesia Veliz and Dr Kortney Soni  He was to FU with Dr Piper Villa in 3 months (Feb 2021) with CBC, CMP, CT chest, abd, pelvis prior to visit  Called and spoke with patient  He is not having any acute problems but wishes to resume care with [facility] urology for his cancer surveillance after surgery  He would like to FU in the [place] office from now on and does not wish to continue with Dr Kortney Soni  His brother is a patient of Dr Julio C Paul and his PCP also recommended Dr Julio C Paul, so that is who he would like to see  Unfortunately Dr Livia Fuller availability is limited  Since he is due for his 3 month FU in Feb 2021 I offered 3/1 with Dr Esther Lomas for timing and patient declined  First available for Dr Julio C Paul is 4/8/21 (I offered 3/25 but patient could not do the early morning times available)  Patient accepted early April appt  He will be cancelling his 3 month FU with Dr Piper Villa and has not had the recommended CT scan and blood work as of yet  Will review with our providers and have them order the surveillance imaging /blood work as appropriate to be completed here prior to his FU on 4/8  Patient is appreciative of this plan

## 2021-02-14 NOTE — TELEPHONE ENCOUNTER
Patient last seen in the office in September 2020  OK to order surveillance imaging before next follow up

## 2021-02-15 NOTE — TELEPHONE ENCOUNTER
Called Arlene Edwards and told him blood work and Ct scan was order [facility]3] [employer] to patient

## 2021-02-16 ENCOUNTER — TELEPHONE (OUTPATIENT)
Dept: INTERNAL MEDICINE CLINIC | Facility: CLINIC | Age: 81
End: 2021-02-16

## 2021-02-16 DIAGNOSIS — R26.2 AMBULATORY DYSFUNCTION: Primary | ICD-10-CM

## 2021-02-16 DIAGNOSIS — R29.6 FREQUENT FALLS: ICD-10-CM

## 2021-02-16 NOTE — ASSESSMENT & PLAN NOTE
Patient's wife is calling today  Patient is still having problems with ambulation  Is getting weaker  He has had more frequent falls but locally no severe injuries at this point  The wife feels that he may benefit from physical therapy and I agree  He has profound weakness with his major operations recently, difficulties with ambulation secondary to his peripheral neuropathy  We will put in a consult for him to be seen by health care agent with the wife feeling that he is too weak to go to physical therapy as an outpatient

## 2021-02-16 NOTE — TELEPHONE ENCOUNTER
Patient is having problems with weakness, occasional falls but no severe injury  Patient has a severe peripheral neuropathy which makes ambulate shin more difficult  Also weakness after his recent surgery    We will attempt to arrange for the patient to get in-home rehab

## 2021-02-16 NOTE — TELEPHONE ENCOUNTER
pts wife called  Shed like to talk to you about his worsening condition  She feels maybe in home PT would benefit him and wants to see what you think

## 2021-02-17 ENCOUNTER — APPOINTMENT (EMERGENCY)
Dept: RADIOLOGY | Facility: HOSPITAL | Age: 81
DRG: 862 | End: 2021-02-17
Payer: MEDICARE

## 2021-02-17 ENCOUNTER — HOSPITAL ENCOUNTER (INPATIENT)
Facility: HOSPITAL | Age: 81
LOS: 9 days | Discharge: HOME HEALTH | DRG: 862 | End: 2021-02-26
Attending: EMERGENCY MEDICINE | Admitting: INTERNAL MEDICINE
Payer: MEDICARE

## 2021-02-17 DIAGNOSIS — E43 SEVERE PROTEIN-CALORIE MALNUTRITION (HCC): ICD-10-CM

## 2021-02-17 DIAGNOSIS — R53.1 WEAKNESS: ICD-10-CM

## 2021-02-17 DIAGNOSIS — E87.8 ELECTROLYTE DISTURBANCE: ICD-10-CM

## 2021-02-17 DIAGNOSIS — K65.1 PELVIC ABSCESS IN MALE (HCC): ICD-10-CM

## 2021-02-17 DIAGNOSIS — A41.9 SEPSIS (HCC): ICD-10-CM

## 2021-02-17 DIAGNOSIS — E11.3293 TYPE 2 DIABETES MELLITUS WITH BOTH EYES AFFECTED BY MILD NONPROLIFERATIVE RETINOPATHY WITHOUT MACULAR EDEMA, WITHOUT LONG-TERM CURRENT USE OF INSULIN (HCC): ICD-10-CM

## 2021-02-17 DIAGNOSIS — C67.8 MALIGNANT NEOPLASM OF OVERLAPPING SITES OF BLADDER (HCC): ICD-10-CM

## 2021-02-17 DIAGNOSIS — R77.8 ELEVATED TROPONIN: Primary | ICD-10-CM

## 2021-02-17 DIAGNOSIS — R93.5 ABNORMAL CT OF THE ABDOMEN: ICD-10-CM

## 2021-02-17 DIAGNOSIS — D72.829 LEUKOCYTOSIS: ICD-10-CM

## 2021-02-17 DIAGNOSIS — N17.9 ACUTE KIDNEY INJURY (HCC): ICD-10-CM

## 2021-02-17 LAB
ALBUMIN SERPL BCP-MCNC: 2.8 G/DL (ref 3.5–5)
ALP SERPL-CCNC: 98 U/L (ref 46–116)
ALT SERPL W P-5'-P-CCNC: 25 U/L (ref 12–78)
ANION GAP SERPL CALCULATED.3IONS-SCNC: 13 MMOL/L (ref 4–13)
AST SERPL W P-5'-P-CCNC: 22 U/L (ref 5–45)
BASOPHILS # BLD AUTO: 0.09 THOUSANDS/ΜL (ref 0–0.1)
BASOPHILS NFR BLD AUTO: 0 % (ref 0–1)
BILIRUB SERPL-MCNC: 0.33 MG/DL (ref 0.2–1)
BUN SERPL-MCNC: 72 MG/DL (ref 5–25)
CALCIUM ALBUM COR SERPL-MCNC: 9.7 MG/DL (ref 8.3–10.1)
CALCIUM SERPL-MCNC: 8.7 MG/DL (ref 8.3–10.1)
CHLORIDE SERPL-SCNC: 106 MMOL/L (ref 100–108)
CO2 SERPL-SCNC: 13 MMOL/L (ref 21–32)
CREAT SERPL-MCNC: 3.59 MG/DL (ref 0.6–1.3)
EOSINOPHIL # BLD AUTO: 0.14 THOUSAND/ΜL (ref 0–0.61)
EOSINOPHIL NFR BLD AUTO: 1 % (ref 0–6)
ERYTHROCYTE [DISTWIDTH] IN BLOOD BY AUTOMATED COUNT: 15 % (ref 11.6–15.1)
GFR SERPL CREATININE-BSD FRML MDRD: 15 ML/MIN/1.73SQ M
GLUCOSE SERPL-MCNC: 169 MG/DL (ref 65–140)
HCT VFR BLD AUTO: 36.4 % (ref 36.5–49.3)
HGB BLD-MCNC: 12.1 G/DL (ref 12–17)
IMM GRANULOCYTES # BLD AUTO: 0.35 THOUSAND/UL (ref 0–0.2)
IMM GRANULOCYTES NFR BLD AUTO: 2 % (ref 0–2)
LACTATE SERPL-SCNC: 1.9 MMOL/L (ref 0.5–2)
LIPASE SERPL-CCNC: 292 U/L (ref 73–393)
LYMPHOCYTES # BLD AUTO: 1.03 THOUSANDS/ΜL (ref 0.6–4.47)
LYMPHOCYTES NFR BLD AUTO: 5 % (ref 14–44)
MCH RBC QN AUTO: 31.4 PG (ref 26.8–34.3)
MCHC RBC AUTO-ENTMCNC: 33.2 G/DL (ref 31.4–37.4)
MCV RBC AUTO: 95 FL (ref 82–98)
MONOCYTES # BLD AUTO: 2.33 THOUSAND/ΜL (ref 0.17–1.22)
MONOCYTES NFR BLD AUTO: 11 % (ref 4–12)
NEUTROPHILS # BLD AUTO: 17.42 THOUSANDS/ΜL (ref 1.85–7.62)
NEUTS SEG NFR BLD AUTO: 81 % (ref 43–75)
NRBC BLD AUTO-RTO: 0 /100 WBCS
PLATELET # BLD AUTO: 236 THOUSANDS/UL (ref 149–390)
PMV BLD AUTO: 12.1 FL (ref 8.9–12.7)
POTASSIUM SERPL-SCNC: 4.7 MMOL/L (ref 3.5–5.3)
PROT SERPL-MCNC: 7.6 G/DL (ref 6.4–8.2)
RBC # BLD AUTO: 3.85 MILLION/UL (ref 3.88–5.62)
SODIUM SERPL-SCNC: 132 MMOL/L (ref 136–145)
TROPONIN I SERPL-MCNC: 1.15 NG/ML
WBC # BLD AUTO: 21.36 THOUSAND/UL (ref 4.31–10.16)

## 2021-02-17 PROCEDURE — 99285 EMERGENCY DEPT VISIT HI MDM: CPT | Performed by: EMERGENCY MEDICINE

## 2021-02-17 PROCEDURE — 84484 ASSAY OF TROPONIN QUANT: CPT | Performed by: EMERGENCY MEDICINE

## 2021-02-17 PROCEDURE — 87040 BLOOD CULTURE FOR BACTERIA: CPT | Performed by: EMERGENCY MEDICINE

## 2021-02-17 PROCEDURE — G1004 CDSM NDSC: HCPCS

## 2021-02-17 PROCEDURE — 85025 COMPLETE CBC W/AUTO DIFF WBC: CPT | Performed by: EMERGENCY MEDICINE

## 2021-02-17 PROCEDURE — 1123F ACP DISCUSS/DSCN MKR DOCD: CPT | Performed by: EMERGENCY MEDICINE

## 2021-02-17 PROCEDURE — 83605 ASSAY OF LACTIC ACID: CPT | Performed by: EMERGENCY MEDICINE

## 2021-02-17 PROCEDURE — 83690 ASSAY OF LIPASE: CPT | Performed by: EMERGENCY MEDICINE

## 2021-02-17 PROCEDURE — 36415 COLL VENOUS BLD VENIPUNCTURE: CPT | Performed by: EMERGENCY MEDICINE

## 2021-02-17 PROCEDURE — 99285 EMERGENCY DEPT VISIT HI MDM: CPT

## 2021-02-17 PROCEDURE — 71045 X-RAY EXAM CHEST 1 VIEW: CPT

## 2021-02-17 PROCEDURE — 80053 COMPREHEN METABOLIC PANEL: CPT | Performed by: EMERGENCY MEDICINE

## 2021-02-17 PROCEDURE — 74176 CT ABD & PELVIS W/O CONTRAST: CPT

## 2021-02-17 PROCEDURE — 96360 HYDRATION IV INFUSION INIT: CPT

## 2021-02-17 PROCEDURE — 93005 ELECTROCARDIOGRAM TRACING: CPT

## 2021-02-17 PROCEDURE — 96361 HYDRATE IV INFUSION ADD-ON: CPT

## 2021-02-17 RX ORDER — ASPIRIN 325 MG
325 TABLET ORAL ONCE
Status: COMPLETED | OUTPATIENT
Start: 2021-02-17 | End: 2021-02-17

## 2021-02-17 RX ADMIN — SODIUM CHLORIDE 500 ML: 0.9 INJECTION, SOLUTION INTRAVENOUS at 21:51

## 2021-02-17 RX ADMIN — ASPIRIN 325 MG ORAL TABLET 325 MG: 325 PILL ORAL at 22:18

## 2021-02-18 PROBLEM — R79.89 ELEVATED TROPONIN: Status: ACTIVE | Noted: 2020-07-21

## 2021-02-18 PROBLEM — R19.09 CENTRAL ABDOMINAL MASS: Status: ACTIVE | Noted: 2021-02-18

## 2021-02-18 PROBLEM — R77.8 ELEVATED TROPONIN: Status: ACTIVE | Noted: 2020-07-21

## 2021-02-18 LAB
ALBUMIN SERPL BCP-MCNC: 2 G/DL (ref 3.5–5)
ALBUMIN SERPL BCP-MCNC: 2.1 G/DL (ref 3.5–5)
ALP SERPL-CCNC: 72 U/L (ref 46–116)
ALP SERPL-CCNC: 72 U/L (ref 46–116)
ALT SERPL W P-5'-P-CCNC: 19 U/L (ref 12–78)
ALT SERPL W P-5'-P-CCNC: 19 U/L (ref 12–78)
ANION GAP SERPL CALCULATED.3IONS-SCNC: 13 MMOL/L (ref 4–13)
ANION GAP SERPL CALCULATED.3IONS-SCNC: 13 MMOL/L (ref 4–13)
AST SERPL W P-5'-P-CCNC: 18 U/L (ref 5–45)
AST SERPL W P-5'-P-CCNC: 21 U/L (ref 5–45)
ATRIAL RATE: 101 BPM
BACTERIA UR QL AUTO: ABNORMAL /HPF
BASOPHILS # BLD AUTO: 0.07 THOUSANDS/ΜL (ref 0–0.1)
BASOPHILS NFR BLD AUTO: 0 % (ref 0–1)
BILIRUB SERPL-MCNC: 0.21 MG/DL (ref 0.2–1)
BILIRUB SERPL-MCNC: 0.25 MG/DL (ref 0.2–1)
BILIRUB UR QL STRIP: NEGATIVE
BUN SERPL-MCNC: 66 MG/DL (ref 5–25)
BUN SERPL-MCNC: 69 MG/DL (ref 5–25)
CALCIUM ALBUM COR SERPL-MCNC: 9.2 MG/DL (ref 8.3–10.1)
CALCIUM ALBUM COR SERPL-MCNC: 9.5 MG/DL (ref 8.3–10.1)
CALCIUM SERPL-MCNC: 7.7 MG/DL (ref 8.3–10.1)
CALCIUM SERPL-MCNC: 7.9 MG/DL (ref 8.3–10.1)
CHLORIDE SERPL-SCNC: 110 MMOL/L (ref 100–108)
CHLORIDE SERPL-SCNC: 111 MMOL/L (ref 100–108)
CHOLEST SERPL-MCNC: 93 MG/DL (ref 50–200)
CK SERPL-CCNC: 43 U/L (ref 39–308)
CLARITY UR: CLEAR
CO2 SERPL-SCNC: 12 MMOL/L (ref 21–32)
CO2 SERPL-SCNC: 13 MMOL/L (ref 21–32)
COARSE GRAN CASTS URNS QL MICRO: ABNORMAL /LPF
COLOR UR: YELLOW
CREAT SERPL-MCNC: 3.32 MG/DL (ref 0.6–1.3)
CREAT SERPL-MCNC: 3.33 MG/DL (ref 0.6–1.3)
EOSINOPHIL # BLD AUTO: 0.2 THOUSAND/ΜL (ref 0–0.61)
EOSINOPHIL NFR BLD AUTO: 1 % (ref 0–6)
ERYTHROCYTE [DISTWIDTH] IN BLOOD BY AUTOMATED COUNT: 14.8 % (ref 11.6–15.1)
ERYTHROCYTE [DISTWIDTH] IN BLOOD BY AUTOMATED COUNT: 14.9 % (ref 11.6–15.1)
EST. AVERAGE GLUCOSE BLD GHB EST-MCNC: 126 MG/DL
FINE GRAN CASTS URNS QL MICRO: ABNORMAL /LPF
GFR SERPL CREATININE-BSD FRML MDRD: 17 ML/MIN/1.73SQ M
GFR SERPL CREATININE-BSD FRML MDRD: 17 ML/MIN/1.73SQ M
GLUCOSE SERPL-MCNC: 109 MG/DL (ref 65–140)
GLUCOSE SERPL-MCNC: 114 MG/DL (ref 65–140)
GLUCOSE SERPL-MCNC: 122 MG/DL (ref 65–140)
GLUCOSE SERPL-MCNC: 142 MG/DL (ref 65–140)
GLUCOSE SERPL-MCNC: 156 MG/DL (ref 65–140)
GLUCOSE SERPL-MCNC: 187 MG/DL (ref 65–140)
GLUCOSE SERPL-MCNC: 84 MG/DL (ref 65–140)
GLUCOSE UR STRIP-MCNC: NEGATIVE MG/DL
HBA1C MFR BLD: 6 %
HCT VFR BLD AUTO: 27.7 % (ref 36.5–49.3)
HCT VFR BLD AUTO: 29.5 % (ref 36.5–49.3)
HDLC SERPL-MCNC: 32 MG/DL
HGB BLD-MCNC: 9 G/DL (ref 12–17)
HGB BLD-MCNC: 9.5 G/DL (ref 12–17)
HGB UR QL STRIP.AUTO: ABNORMAL
IMM GRANULOCYTES # BLD AUTO: 0.25 THOUSAND/UL (ref 0–0.2)
IMM GRANULOCYTES NFR BLD AUTO: 2 % (ref 0–2)
KETONES UR STRIP-MCNC: NEGATIVE MG/DL
LACTATE SERPL-SCNC: 0.9 MMOL/L (ref 0.5–2)
LDLC SERPL CALC-MCNC: 45 MG/DL (ref 0–100)
LEUKOCYTE ESTERASE UR QL STRIP: ABNORMAL
LYMPHOCYTES # BLD AUTO: 1.45 THOUSANDS/ΜL (ref 0.6–4.47)
LYMPHOCYTES NFR BLD AUTO: 9 % (ref 14–44)
MAGNESIUM SERPL-MCNC: 0.7 MG/DL (ref 1.6–2.6)
MAGNESIUM SERPL-MCNC: 0.9 MG/DL (ref 1.6–2.6)
MCH RBC QN AUTO: 31.1 PG (ref 26.8–34.3)
MCH RBC QN AUTO: 31.3 PG (ref 26.8–34.3)
MCHC RBC AUTO-ENTMCNC: 32.2 G/DL (ref 31.4–37.4)
MCHC RBC AUTO-ENTMCNC: 32.5 G/DL (ref 31.4–37.4)
MCV RBC AUTO: 96 FL (ref 82–98)
MCV RBC AUTO: 97 FL (ref 82–98)
MONOCYTES # BLD AUTO: 1.74 THOUSAND/ΜL (ref 0.17–1.22)
MONOCYTES NFR BLD AUTO: 11 % (ref 4–12)
NEUTROPHILS # BLD AUTO: 12.86 THOUSANDS/ΜL (ref 1.85–7.62)
NEUTS SEG NFR BLD AUTO: 77 % (ref 43–75)
NITRITE UR QL STRIP: NEGATIVE
NON-SQ EPI CELLS URNS QL MICRO: ABNORMAL /HPF
NRBC BLD AUTO-RTO: 0 /100 WBCS
P AXIS: 80 DEGREES
PH UR STRIP.AUTO: 7 [PH]
PHOSPHATE SERPL-MCNC: 1.6 MG/DL (ref 2.3–4.1)
PHOSPHATE SERPL-MCNC: 2 MG/DL (ref 2.3–4.1)
PLATELET # BLD AUTO: 180 THOUSANDS/UL (ref 149–390)
PLATELET # BLD AUTO: 183 THOUSANDS/UL (ref 149–390)
PMV BLD AUTO: 11.9 FL (ref 8.9–12.7)
PMV BLD AUTO: 12.2 FL (ref 8.9–12.7)
POTASSIUM SERPL-SCNC: 4.4 MMOL/L (ref 3.5–5.3)
POTASSIUM SERPL-SCNC: 4.5 MMOL/L (ref 3.5–5.3)
PR INTERVAL: 212 MS
PROCALCITONIN SERPL-MCNC: 0.9 NG/ML
PROCALCITONIN SERPL-MCNC: 0.9 NG/ML
PROT SERPL-MCNC: 5.5 G/DL (ref 6.4–8.2)
PROT SERPL-MCNC: 5.6 G/DL (ref 6.4–8.2)
PROT UR STRIP-MCNC: ABNORMAL MG/DL
QRS AXIS: -81 DEGREES
QRSD INTERVAL: 118 MS
QT INTERVAL: 332 MS
QTC INTERVAL: 423 MS
RBC # BLD AUTO: 2.89 MILLION/UL (ref 3.88–5.62)
RBC # BLD AUTO: 3.04 MILLION/UL (ref 3.88–5.62)
RBC #/AREA URNS AUTO: ABNORMAL /HPF
SODIUM SERPL-SCNC: 135 MMOL/L (ref 136–145)
SODIUM SERPL-SCNC: 137 MMOL/L (ref 136–145)
SP GR UR STRIP.AUTO: 1.01 (ref 1–1.03)
T WAVE AXIS: 72 DEGREES
TRIGL SERPL-MCNC: 81 MG/DL
TROPONIN I SERPL-MCNC: 0.83 NG/ML
TROPONIN I SERPL-MCNC: 0.96 NG/ML
TROPONIN I SERPL-MCNC: 0.98 NG/ML
TSH SERPL DL<=0.05 MIU/L-ACNC: 0.8 UIU/ML (ref 0.36–3.74)
UROBILINOGEN UR QL STRIP.AUTO: 0.2 E.U./DL
VENTRICULAR RATE: 98 BPM
WBC # BLD AUTO: 16.57 THOUSAND/UL (ref 4.31–10.16)
WBC # BLD AUTO: 17.34 THOUSAND/UL (ref 4.31–10.16)
WBC #/AREA URNS AUTO: ABNORMAL /HPF

## 2021-02-18 PROCEDURE — 82948 REAGENT STRIP/BLOOD GLUCOSE: CPT

## 2021-02-18 PROCEDURE — 36415 COLL VENOUS BLD VENIPUNCTURE: CPT | Performed by: INTERNAL MEDICINE

## 2021-02-18 PROCEDURE — 84484 ASSAY OF TROPONIN QUANT: CPT | Performed by: INTERNAL MEDICINE

## 2021-02-18 PROCEDURE — 93010 ELECTROCARDIOGRAM REPORT: CPT | Performed by: INTERNAL MEDICINE

## 2021-02-18 PROCEDURE — 80053 COMPREHEN METABOLIC PANEL: CPT | Performed by: PHYSICIAN ASSISTANT

## 2021-02-18 PROCEDURE — 99221 1ST HOSP IP/OBS SF/LOW 40: CPT | Performed by: UROLOGY

## 2021-02-18 PROCEDURE — 85025 COMPLETE CBC W/AUTO DIFF WBC: CPT | Performed by: INTERNAL MEDICINE

## 2021-02-18 PROCEDURE — 80061 LIPID PANEL: CPT | Performed by: INTERNAL MEDICINE

## 2021-02-18 PROCEDURE — 83735 ASSAY OF MAGNESIUM: CPT | Performed by: PHYSICIAN ASSISTANT

## 2021-02-18 PROCEDURE — 83605 ASSAY OF LACTIC ACID: CPT | Performed by: INTERNAL MEDICINE

## 2021-02-18 PROCEDURE — 85027 COMPLETE CBC AUTOMATED: CPT | Performed by: PHYSICIAN ASSISTANT

## 2021-02-18 PROCEDURE — 81001 URINALYSIS AUTO W/SCOPE: CPT | Performed by: INTERNAL MEDICINE

## 2021-02-18 PROCEDURE — 84100 ASSAY OF PHOSPHORUS: CPT | Performed by: INTERNAL MEDICINE

## 2021-02-18 PROCEDURE — 84145 PROCALCITONIN (PCT): CPT | Performed by: INTERNAL MEDICINE

## 2021-02-18 PROCEDURE — 83735 ASSAY OF MAGNESIUM: CPT | Performed by: INTERNAL MEDICINE

## 2021-02-18 PROCEDURE — 84100 ASSAY OF PHOSPHORUS: CPT | Performed by: PHYSICIAN ASSISTANT

## 2021-02-18 PROCEDURE — 80053 COMPREHEN METABOLIC PANEL: CPT | Performed by: INTERNAL MEDICINE

## 2021-02-18 PROCEDURE — 99223 1ST HOSP IP/OBS HIGH 75: CPT | Performed by: INTERNAL MEDICINE

## 2021-02-18 PROCEDURE — 82550 ASSAY OF CK (CPK): CPT | Performed by: INTERNAL MEDICINE

## 2021-02-18 PROCEDURE — 83036 HEMOGLOBIN GLYCOSYLATED A1C: CPT | Performed by: INTERNAL MEDICINE

## 2021-02-18 PROCEDURE — 84443 ASSAY THYROID STIM HORMONE: CPT | Performed by: INTERNAL MEDICINE

## 2021-02-18 RX ORDER — MELATONIN
2000 DAILY
Status: DISCONTINUED | OUTPATIENT
Start: 2021-02-18 | End: 2021-02-26 | Stop reason: HOSPADM

## 2021-02-18 RX ORDER — CHLORAL HYDRATE 500 MG
1000 CAPSULE ORAL DAILY
Status: DISCONTINUED | OUTPATIENT
Start: 2021-02-18 | End: 2021-02-26 | Stop reason: HOSPADM

## 2021-02-18 RX ORDER — LOSARTAN POTASSIUM 50 MG/1
100 TABLET ORAL DAILY
Status: DISCONTINUED | OUTPATIENT
Start: 2021-02-18 | End: 2021-02-18

## 2021-02-18 RX ORDER — NYSTATIN 100000 [USP'U]/G
POWDER TOPICAL 3 TIMES DAILY
Status: DISCONTINUED | OUTPATIENT
Start: 2021-02-18 | End: 2021-02-26 | Stop reason: HOSPADM

## 2021-02-18 RX ORDER — MAGNESIUM HYDROXIDE/ALUMINUM HYDROXICE/SIMETHICONE 120; 1200; 1200 MG/30ML; MG/30ML; MG/30ML
30 SUSPENSION ORAL EVERY 6 HOURS PRN
Status: DISCONTINUED | OUTPATIENT
Start: 2021-02-18 | End: 2021-02-26 | Stop reason: HOSPADM

## 2021-02-18 RX ORDER — ACETAMINOPHEN 325 MG/1
650 TABLET ORAL EVERY 6 HOURS PRN
Status: DISCONTINUED | OUTPATIENT
Start: 2021-02-18 | End: 2021-02-26 | Stop reason: HOSPADM

## 2021-02-18 RX ORDER — SODIUM CHLORIDE, SODIUM GLUCONATE, SODIUM ACETATE, POTASSIUM CHLORIDE, MAGNESIUM CHLORIDE, SODIUM PHOSPHATE, DIBASIC, AND POTASSIUM PHOSPHATE .53; .5; .37; .037; .03; .012; .00082 G/100ML; G/100ML; G/100ML; G/100ML; G/100ML; G/100ML; G/100ML
1000 INJECTION, SOLUTION INTRAVENOUS ONCE
Status: DISCONTINUED | OUTPATIENT
Start: 2021-02-19 | End: 2021-02-19

## 2021-02-18 RX ORDER — CITALOPRAM 20 MG/1
20 TABLET ORAL DAILY
Status: DISCONTINUED | OUTPATIENT
Start: 2021-02-18 | End: 2021-02-26 | Stop reason: HOSPADM

## 2021-02-18 RX ORDER — SODIUM CHLORIDE, SODIUM LACTATE, POTASSIUM CHLORIDE, CALCIUM CHLORIDE 600; 310; 30; 20 MG/100ML; MG/100ML; MG/100ML; MG/100ML
100 INJECTION, SOLUTION INTRAVENOUS CONTINUOUS
Status: DISCONTINUED | OUTPATIENT
Start: 2021-02-18 | End: 2021-02-18

## 2021-02-18 RX ORDER — SODIUM CHLORIDE, SODIUM GLUCONATE, SODIUM ACETATE, POTASSIUM CHLORIDE, MAGNESIUM CHLORIDE, SODIUM PHOSPHATE, DIBASIC, AND POTASSIUM PHOSPHATE .53; .5; .37; .037; .03; .012; .00082 G/100ML; G/100ML; G/100ML; G/100ML; G/100ML; G/100ML; G/100ML
75 INJECTION, SOLUTION INTRAVENOUS CONTINUOUS
Status: DISCONTINUED | OUTPATIENT
Start: 2021-02-18 | End: 2021-02-24

## 2021-02-18 RX ORDER — SODIUM CHLORIDE, SODIUM GLUCONATE, SODIUM ACETATE, POTASSIUM CHLORIDE, MAGNESIUM CHLORIDE, SODIUM PHOSPHATE, DIBASIC, AND POTASSIUM PHOSPHATE .53; .5; .37; .037; .03; .012; .00082 G/100ML; G/100ML; G/100ML; G/100ML; G/100ML; G/100ML; G/100ML
1000 INJECTION, SOLUTION INTRAVENOUS ONCE
Status: COMPLETED | OUTPATIENT
Start: 2021-02-18 | End: 2021-02-18

## 2021-02-18 RX ORDER — FLUTICASONE PROPIONATE 50 MCG
2 SPRAY, SUSPENSION (ML) NASAL DAILY
Status: DISCONTINUED | OUTPATIENT
Start: 2021-02-18 | End: 2021-02-26 | Stop reason: HOSPADM

## 2021-02-18 RX ORDER — ASPIRIN 81 MG/1
81 TABLET, CHEWABLE ORAL DAILY
Status: DISCONTINUED | OUTPATIENT
Start: 2021-02-18 | End: 2021-02-26 | Stop reason: HOSPADM

## 2021-02-18 RX ORDER — DOCUSATE SODIUM 100 MG/1
100 CAPSULE, LIQUID FILLED ORAL 2 TIMES DAILY PRN
Status: DISCONTINUED | OUTPATIENT
Start: 2021-02-18 | End: 2021-02-21

## 2021-02-18 RX ORDER — MAGNESIUM SULFATE HEPTAHYDRATE 40 MG/ML
2 INJECTION, SOLUTION INTRAVENOUS ONCE
Status: COMPLETED | OUTPATIENT
Start: 2021-02-18 | End: 2021-02-19

## 2021-02-18 RX ORDER — HEPARIN SODIUM 5000 [USP'U]/ML
5000 INJECTION, SOLUTION INTRAVENOUS; SUBCUTANEOUS EVERY 8 HOURS SCHEDULED
Status: DISCONTINUED | OUTPATIENT
Start: 2021-02-18 | End: 2021-02-26 | Stop reason: HOSPADM

## 2021-02-18 RX ORDER — ATORVASTATIN CALCIUM 20 MG/1
40 TABLET, FILM COATED ORAL
Status: DISCONTINUED | OUTPATIENT
Start: 2021-02-18 | End: 2021-02-26 | Stop reason: HOSPADM

## 2021-02-18 RX ORDER — ONDANSETRON 2 MG/ML
4 INJECTION INTRAMUSCULAR; INTRAVENOUS EVERY 6 HOURS PRN
Status: DISCONTINUED | OUTPATIENT
Start: 2021-02-18 | End: 2021-02-26 | Stop reason: HOSPADM

## 2021-02-18 RX ORDER — SODIUM CHLORIDE, SODIUM GLUCONATE, SODIUM ACETATE, POTASSIUM CHLORIDE, MAGNESIUM CHLORIDE, SODIUM PHOSPHATE, DIBASIC, AND POTASSIUM PHOSPHATE .53; .5; .37; .037; .03; .012; .00082 G/100ML; G/100ML; G/100ML; G/100ML; G/100ML; G/100ML; G/100ML
40 INJECTION, SOLUTION INTRAVENOUS ONCE
Status: DISCONTINUED | OUTPATIENT
Start: 2021-02-19 | End: 2021-02-19

## 2021-02-18 RX ORDER — VITAMIN E 268 MG
400 CAPSULE ORAL DAILY
Status: DISCONTINUED | OUTPATIENT
Start: 2021-02-18 | End: 2021-02-26 | Stop reason: HOSPADM

## 2021-02-18 RX ORDER — TRAMADOL HYDROCHLORIDE 50 MG/1
50 TABLET ORAL EVERY 6 HOURS PRN
Status: DISCONTINUED | OUTPATIENT
Start: 2021-02-18 | End: 2021-02-26 | Stop reason: HOSPADM

## 2021-02-18 RX ORDER — PANTOPRAZOLE SODIUM 40 MG/1
40 TABLET, DELAYED RELEASE ORAL
Status: DISCONTINUED | OUTPATIENT
Start: 2021-02-18 | End: 2021-02-26 | Stop reason: HOSPADM

## 2021-02-18 RX ADMIN — ATORVASTATIN CALCIUM 40 MG: 20 TABLET, FILM COATED ORAL at 22:48

## 2021-02-18 RX ADMIN — SODIUM CHLORIDE, SODIUM GLUCONATE, SODIUM ACETATE, POTASSIUM CHLORIDE, MAGNESIUM CHLORIDE, SODIUM PHOSPHATE, DIBASIC, AND POTASSIUM PHOSPHATE 1000 ML: .53; .5; .37; .037; .03; .012; .00082 INJECTION, SOLUTION INTRAVENOUS at 00:48

## 2021-02-18 RX ADMIN — HEPARIN SODIUM 5000 UNITS: 5000 INJECTION INTRAVENOUS; SUBCUTANEOUS at 22:49

## 2021-02-18 RX ADMIN — CEFEPIME HYDROCHLORIDE 2000 MG: 2 INJECTION, POWDER, FOR SOLUTION INTRAVENOUS at 00:37

## 2021-02-18 RX ADMIN — Medication 1 TABLET: at 08:30

## 2021-02-18 RX ADMIN — NYSTATIN: 100000 POWDER TOPICAL at 08:30

## 2021-02-18 RX ADMIN — METOPROLOL TARTRATE 50 MG: 25 TABLET, FILM COATED ORAL at 08:30

## 2021-02-18 RX ADMIN — CITALOPRAM HYDROBROMIDE 20 MG: 20 TABLET ORAL at 08:30

## 2021-02-18 RX ADMIN — METOPROLOL TARTRATE 50 MG: 25 TABLET, FILM COATED ORAL at 17:35

## 2021-02-18 RX ADMIN — Medication 2000 UNITS: at 08:30

## 2021-02-18 RX ADMIN — HEPARIN SODIUM 5000 UNITS: 5000 INJECTION INTRAVENOUS; SUBCUTANEOUS at 14:18

## 2021-02-18 RX ADMIN — METRONIDAZOLE 500 MG: 500 INJECTION, SOLUTION INTRAVENOUS at 00:52

## 2021-02-18 RX ADMIN — POTASSIUM PHOSPHATE, MONOBASIC AND POTASSIUM PHOSPHATE, DIBASIC 21 MMOL: 224; 236 INJECTION, SOLUTION, CONCENTRATE INTRAVENOUS at 17:35

## 2021-02-18 RX ADMIN — SODIUM CHLORIDE, SODIUM LACTATE, POTASSIUM CHLORIDE, AND CALCIUM CHLORIDE 100 ML/HR: .6; .31; .03; .02 INJECTION, SOLUTION INTRAVENOUS at 04:39

## 2021-02-18 RX ADMIN — TRAMADOL HYDROCHLORIDE 50 MG: 50 TABLET, FILM COATED ORAL at 08:43

## 2021-02-18 RX ADMIN — HEPARIN SODIUM 5000 UNITS: 5000 INJECTION INTRAVENOUS; SUBCUTANEOUS at 05:38

## 2021-02-18 RX ADMIN — ATORVASTATIN CALCIUM 40 MG: 20 TABLET, FILM COATED ORAL at 04:03

## 2021-02-18 RX ADMIN — SODIUM CHLORIDE, SODIUM GLUCONATE, SODIUM ACETATE, POTASSIUM CHLORIDE, MAGNESIUM CHLORIDE, SODIUM PHOSPHATE, DIBASIC, AND POTASSIUM PHOSPHATE 100 ML/HR: .53; .5; .37; .037; .03; .012; .00082 INJECTION, SOLUTION INTRAVENOUS at 11:57

## 2021-02-18 RX ADMIN — NYSTATIN: 100000 POWDER TOPICAL at 17:36

## 2021-02-18 RX ADMIN — CEFEPIME HYDROCHLORIDE 1000 MG: 1 INJECTION, POWDER, FOR SOLUTION INTRAMUSCULAR; INTRAVENOUS at 22:49

## 2021-02-18 RX ADMIN — METRONIDAZOLE 500 MG: 500 INJECTION, SOLUTION INTRAVENOUS at 17:35

## 2021-02-18 RX ADMIN — ASPIRIN 81 MG: 81 TABLET, CHEWABLE ORAL at 08:30

## 2021-02-18 RX ADMIN — PANTOPRAZOLE SODIUM 40 MG: 40 TABLET, DELAYED RELEASE ORAL at 05:39

## 2021-02-18 RX ADMIN — METRONIDAZOLE 500 MG: 500 INJECTION, SOLUTION INTRAVENOUS at 08:39

## 2021-02-18 RX ADMIN — MAGNESIUM SULFATE HEPTAHYDRATE 2 G: 40 INJECTION, SOLUTION INTRAVENOUS at 14:17

## 2021-02-18 RX ADMIN — Medication 400 UNITS: at 08:30

## 2021-02-18 NOTE — QUICK NOTE
Post admit check      Patient was comfortable without any acute pain or discomfort  Blood pressure 140/62, pulse 70, temperature 97 8 °F (36 6 °C), resp  rate 17, weight 71 8 kg (158 lb 4 6 oz), SpO2 100 %  Constitutional: Pt appears comfortable  Not in any acute distress  Cardiovascular: Normal rate, regular rhythm, normal heart sounds  No murmur heard  Pulmonary/Chest: Effort normal, air entry b/l equal  No respiratory distress  Pt has no wheezes or crackles  Abdominal: Soft  Non-distended, abdominal tenderness noted  Bowel sounds are normal    Neurological: awake, alert  Communicative      Assessment and plan    For Suspicion of intra-abdominal mass/abscess, Urology consult in setting of high-grade bladder cancer status post cystectomy with ileal conduit  Urology evaluation noted, no plan for surgical intervention for now, appreciate input  Continue IV cefepime and Flagyl  Follow blood cultures  Trend procalcitonin and CBC  For acute kidney injury, likely related to volume depletion  No hydronephrosis on CT scan  Obtain UA  Continue IV hydration  Trend BMP  Monitor urine output  If creatinine does not improve, will consider Nephrology evaluation  Replete and trend electrolytes  Elevated troponin, trending down and no chest discomfort  Likely non MI in setting of above

## 2021-02-18 NOTE — CASE MANAGEMENT
LOS1  Not a bundle  Green readmission score of 23    TC to patient's wife/POA and primary contact-Sheyla Dutton [phone] to discuss DCP and CM role  Pt lives with wife in 2 story home with 3-5 NANO and 13 Steps to bedroom  Half bath on first floor  Pt independent with ADLs and ambulation with cane pta  His wife assists with changing of ileoconduit bag as patient with poor vision secondary to macular degeneration  Pt does not drive  DME:Cane, grab bars and shower chair  Pt is known to [facility]  Pt is known to [facility] s/p bladder surgery 11/20  Wife stated that if patient will need rehab again she would like referral to [facility] as she would like him to remain within the system for care needs  PCP: Dr Emily Madden is [facility] but would like to use [facility] for any d/c needs  Pt recently started on Celexa for depression s/p bladder CA  Wife states she is not sure if medication is helping and may need to be re-evaluated for same  Note left for Dr Cielo Richardson  No prior inpt admissions for mental health or substance abuse in past  Has POA/LW-requested copy for chart  Will follow for dcp needs

## 2021-02-18 NOTE — ED PROVIDER NOTES
History  Chief Complaint   Patient presents with    Weakness - Generalized     Per EMS, "Pt feeling generally weak for a long time  Surgery in Oct 2020 "  Per pt he was trying to diet and then had surgery in Oct 2020 but has been having weakness since before the surgery  Recent COVID shot yesterday  States that he has difficutly doing his stairs  80-year-old male with history of bladder cancer for which he had bladder resection at [address] with ileal conduit  States he has been having increasing generalized weakness, increasing exertional dyspnea with decreased appetite and significant weight loss over last few months  He does state that he had a COVID shot yesterday  Denies fevers, night sweats, chills, sore throat, cough, chest pain, trauma  Prior to Admission Medications   Prescriptions Last Dose Informant Patient Reported? Taking?    Blood Glucose Monitoring Suppl (OneTouch Verio Flex System) w/Device KIT   Yes No   Sig: USE TO TEST BLOOD GLUCOSE DAILY   Cholecalciferol (VITAMIN D) 50 MCG (2000 UT) tablet  Self Yes No   Sig: Take 2,000 Units by mouth daily   Lancets (OneTouch Delica Plus [id number]) MISC   Yes No   Sig: TEST BLOOD GLUCOSE ONCE DAILY   Multiple Vitamins-Minerals (CENTRUM SILVER 50+MEN PO)  Self Yes No   Sig: Take 1 tablet by mouth daily     Multiple Vitamins-Minerals (OCUVITE-LUTEIN PO)  Self Yes No   Sig: Take 1 tablet by mouth 2 (two) times a day Pt is taking preservision   Omega-3 Fatty Acids (fish oil) 1,000 mg   Yes No   Sig: Hold for 5 days from discharge   OneTouch Verio test strip   Yes No   Sig: daily Test   acetaminophen (TYLENOL) 500 mg tablet   Yes No   Sig: Take 1,000 mg by mouth   aspirin 81 mg chewable tablet   Yes No   Sig: Chew 81 mg daily   atorvastatin (LIPITOR) 40 mg tablet   No No   Sig: Take 1 tablet (40 mg total) by mouth daily at bedtime   citalopram (CeleXA) 10 mg tablet   No No   Sig: Take 1 tablet (10 mg total) by mouth daily citalopram (CeleXA) 20 mg tablet   No No   Sig: Take 1 tablet (20 mg total) by mouth daily   fluticasone (FLONASE) 50 mcg/act nasal spray   No No   Si sprays into each nostril daily   irbesartan (AVAPRO) 300 mg tablet  Self No No   Sig: TAKE ONE TABLET BY MOUTH EVERY DAY   metFORMIN (GLUCOPHAGE-XR) 500 mg 24 hr tablet   No No   Sig: TAKE TWO TABLETS BY MOUTH TWICE A DAY WITH MEALS   metoprolol tartrate (LOPRESSOR) 50 mg tablet  Self No No   Sig: TAKE ONE TABLET BY MOUTH TWICE A DAY   nystatin (MYCOSTATIN) powder   No No   Sig: Apply topically 3 (three) times a day   omeprazole (PriLOSEC) 40 MG capsule  Self Yes No   Sig: Take 40 mg by mouth daily at bedtime     traMADol (ULTRAM) 50 mg tablet   Yes No   Sig: Take 50 mg by mouth every 6 (six) hours as needed   vitamin E, tocopherol, 400 units capsule   Yes No   Sig: Take 400 Units by mouth daily      Facility-Administered Medications: None       Past Medical History:   Diagnosis Date    Acute kidney injury ([address] )     Arthritis     Hands    Wright esophagus     BPH with obstruction/lower urinary tract symptoms     CAD (coronary artery disease)     Last assessed 09/16/15    Cancer ([address] )     bladder    Cataract, acquired     Last assessed 10/10/17    Coronary artery disease     Diabetes mellitus (HCC)     NIDDM    Diabetic neuropathy (HCC)     Feet    Enlarged prostate with lower urinary tract symptoms (LUTS)     Last assessed 10/10/17    Erectile dysfunction     GERD (gastroesophageal reflux disease)     Last assessed 10/10/17    Hemoptysis     Last assessed 16    Hypercholesterolemia     Last assessed 10/10/17    Hypertension     Last assessed 10/10/17    Macular degeneration     Right eye is particularly affected    Myocardial infarction ([address] )     OAB (overactive bladder)     Testicular hypofunction     Testicular hypogonadism     Last assessed 10/10/17    Tinnitus     Umbilical hernia     Last assessed 14    Urge incontinence of urine     Wears glasses        Past Surgical History:   Procedure Laterality Date    COLONOSCOPY      CORONARY ARTERY BYPASS GRAFT  07/16/2014    ABG x 4 LIMA to LAD,SVG to diagnoal 2 SVG to OM-1, SVG to PDA, resection of partial plerual mass    CYSTOSCOPY  2013    ESOPHAGOGASTRODUODENOSCOPY      FL RETROGRADE PYELOGRAM  12/20/2018    FL RETROGRADE PYELOGRAM  12/5/2019    INGUINAL HERNIA REPAIR Bilateral 2015    PHOTODYNAMIC THERAPY      For Barretts esophagus    SC COLONOSCOPY FLX DX W/COLLJ SPEC WHEN PFRMD N/A 4/25/2016    Procedure: COLONOSCOPY;  Surgeon: Poppy Sosa MD;  Location: [facility]; Service: Gastroenterology    SC CYSTOURETHROSCOPY,BIOPSY N/A 9/10/2020    Procedure: CYSTOSCOPY, COLLECTION OF LEFT KIDNEY CYTOLOGY, BILATERAL RETROGRADE PYELOGRAM, BLADDER WALL BIOPSIES  AND [address], RANDOM BLADDER TUMOR BIOPSIES;  Surgeon: Arabella Cid MD;  Location: [address] [place] [address] MAIN OR;  Service: Urology    SC CYSTOURETHROSCOPY,FULGUR 2-5 CM LESN N/A 4/16/2020    Procedure: CYSTOSCOPY, TRANSURETHRAL RESECTION OF BLADDER TUMOR (TURBT);   Surgeon: Arabella Cid MD;  [facility];  Service: Urology    SC CYSTOURETHROSCOPY,FULGUR <0 5 CM LESN N/A 12/5/2019    Procedure: CYSTO W/TURBT AND TRANSURETHRAL PROSTATE BIOPSY AND OPENING OF BLADDER NECK CONTRACTURE, B/L Retrograde pyelogram;  Surgeon: Arabella Cid MD;  [facility];  Service: Urology    TONSILLECTOMY      TRANSURETHRAL RESECTION OF PROSTATE N/A 12/20/2018    Procedure: CYSTO, PHOTO SELECTIVE VAPORIZATION OF PROSTATE, B/L RETROGRADE PYELOGRAM, TURBT;  Surgeon: Arabella Cid MD;  [facility];  Service: Urology    UMBILICAL HERNIA REPAIR  2012    UPPER GASTROINTESTINAL ENDOSCOPY         Family History   Problem Relation Age of Onset    Cancer Mother     Other Mother         Digestive System Complications    Diabetes Father     Heart disease Father     Hypertension Father     Coronary artery disease Father     Hyperlipidemia Father      I have reviewed and agree with the history as documented  E-Cigarette/Vaping    E-Cigarette Use Never User      E-Cigarette/Vaping Substances     Social History     Tobacco Use    Smoking status: Former Smoker     Packs/day: 1 00     Years:      Pack years:      Types: Cigarettes     Quit date:      Years since quittin 1    Smokeless tobacco: Never Used   Substance Use Topics    Alcohol use: Yes     Alcohol/week: 2 0 standard drinks     Types: 1 Glasses of wine, 1 Cans of beer per week     Drinks per session: 1 or 2     Binge frequency: Daily or almost daily     Comment: 1 drink daily- a mixed drink or wine Last 2020    Drug use: No        Review of Systems   Constitutional: Positive for fatigue and unexpected weight change  Negative for chills, diaphoresis and fever  Respiratory: Positive for shortness of breath  Neurological: Positive for weakness  All other systems reviewed and are negative  Physical Exam  ED Triage Vitals   Temperature Pulse Respirations Blood Pressure SpO2   21   98 3 °F (36 8 °C) (!) 107 18 157/63 100 %      Temp Source Heart Rate Source Patient Position - Orthostatic VS BP Location FiO2 (%)   21 --   Oral Monitor Lying Right arm       Pain Score       21       No Pain             Orthostatic Vital Signs  Vitals:    21   BP: 157/63 138/63   Pulse: (!) 107 96   Patient Position - Orthostatic VS:  Lying       Physical Exam  Vitals signs and nursing note reviewed  Constitutional:       General: He is not in acute distress  Appearance: He is well-developed  He is not diaphoretic  HENT:      Head: Normocephalic and atraumatic        Right Ear: External ear normal       Left Ear: External ear normal       Nose: Nose normal    Eyes:      General: No scleral icterus  Right eye: No discharge  Left eye: No discharge  Conjunctiva/sclera: Conjunctivae normal    Neck:      Musculoskeletal: Normal range of motion and neck supple  Cardiovascular:      Rate and Rhythm: Normal rate and regular rhythm  Heart sounds: Normal heart sounds  No murmur  No friction rub  No gallop  Pulmonary:      Effort: Pulmonary effort is normal  No respiratory distress  Breath sounds: Normal breath sounds  No wheezing or rales  Abdominal:      General: Bowel sounds are normal  There is no distension  Palpations: Abdomen is soft  There is no mass  Tenderness: There is abdominal tenderness in the right lower quadrant  There is no right CVA tenderness, left CVA tenderness, guarding or rebound  Positive signs include McBurney's sign  Negative signs include Short's sign and Rovsing's sign  Comments: Right-sided ostomy in place with urine noted  No obvious induration, purulence, drainage noted around the ostomy site  Musculoskeletal: Normal range of motion  General: No tenderness or deformity  Skin:     General: Skin is warm and dry  Coloration: Skin is not pale  Findings: No erythema or rash  Neurological:      Mental Status: He is alert and oriented to person, place, and time  Psychiatric:         Behavior: Behavior normal          Thought Content:  Thought content normal          Judgment: Judgment normal          ED Medications  Medications   sodium chloride 0 9 % bolus 500 mL (500 mL Intravenous New Bag 2/17/21 2151)   cefepime (MAXIPIME) 2 g/50 mL dextrose IVPB (has no administration in time range)   metroNIDAZOLE (FLAGYL) IVPB (premix) 500 mg 100 mL (has no administration in time range)   aspirin tablet 325 mg (325 mg Oral Given 2/17/21 2218)       Diagnostic Studies  Results Reviewed     Procedure Component Value Units Date/Time    Lactic acid, plasma [[id number]]  (Normal) Collected: 02/17/21 2122    Lab Status: Final result Specimen: Blood from Arm, Left Updated: 02/17/21 2204     LACTIC ACID 1 9 mmol/L     Narrative:      Result may be elevated if tourniquet was used during collection  Blood culture [[id number]] Collected: 02/17/21 2150    Lab Status: In process Specimen: Blood from Arm, Right Updated: 02/17/21 2203    Comprehensive metabolic panel [[id number]]  (Abnormal) Collected: 02/17/21 2107    Lab Status: Final result Specimen: Blood from Arm, Left Updated: 02/17/21 2132     Sodium 132 mmol/L      Potassium 4 7 mmol/L      Chloride 106 mmol/L      CO2 13 mmol/L      ANION GAP 13 mmol/L      BUN 72 mg/dL      Creatinine 3 59 mg/dL      Glucose 169 mg/dL      Calcium 8 7 mg/dL      Corrected Calcium 9 7 mg/dL      AST 22 U/L      ALT 25 U/L      Alkaline Phosphatase 98 U/L      Total Protein 7 6 g/dL      Albumin 2 8 g/dL      Total Bilirubin 0 33 mg/dL      eGFR 15 ml/min/1 73sq m     Narrative:      Amanda guidelines for Chronic Kidney Disease (CKD):     Stage 1 with normal or high GFR (GFR > 90 mL/min/1 73 square meters)    Stage 2 Mild CKD (GFR = 60-89 mL/min/1 73 square meters)    Stage 3A Moderate CKD (GFR = 45-59 mL/min/1 73 square meters)    Stage 3B Moderate CKD (GFR = 30-44 mL/min/1 73 square meters)    Stage 4 Severe CKD (GFR = 15-29 mL/min/1 73 square meters)    Stage 5 End Stage CKD (GFR <15 mL/min/1 73 square meters)  Note: GFR calculation is accurate only with a steady state creatinine    Lipase [[id number]]  (Normal) Collected: 02/17/21 2107    Lab Status: Final result Specimen: Blood from Arm, Left Updated: 02/17/21 2132     Lipase 292 u/L     Troponin I [[id number]]  (Abnormal) Collected: 02/17/21 2107    Lab Status: Final result Specimen: Blood from Arm, Left Updated: 02/17/21 2132     Troponin I 1 15 ng/mL     Blood culture [[id number]] Collected: 02/17/21 2122    Lab Status:  In process Specimen: Blood from Arm, Left Updated: 02/17/21 2131    Lactic acid, plasma [[id number]]     Lab Status: No result Specimen: Blood     CBC and differential [[id number]]  (Abnormal) Collected: 02/17/21 2107    Lab Status: Final result Specimen: Blood from Arm, Left Updated: 02/17/21 2115     WBC 21 36 Thousand/uL      RBC 3 85 Million/uL      Hemoglobin 12 1 g/dL      Hematocrit 36 4 %      MCV 95 fL      MCH 31 4 pg      MCHC 33 2 g/dL      RDW 15 0 %      MPV 12 1 fL      Platelets 985 Thousands/uL      nRBC 0 /100 WBCs      Neutrophils Relative 81 %      Immat GRANS % 2 %      Lymphocytes Relative 5 %      Monocytes Relative 11 %      Eosinophils Relative 1 %      Basophils Relative 0 %      Neutrophils Absolute 17 42 Thousands/µL      Immature Grans Absolute 0 35 Thousand/uL      Lymphocytes Absolute 1 03 Thousands/µL      Monocytes Absolute 2 33 Thousand/µL      Eosinophils Absolute 0 14 Thousand/µL      Basophils Absolute 0 09 Thousands/µL     POCT urinalysis dipstick [[id number]]     Lab Status: No result                  CT abdomen pelvis wo contrast   Final Result by Tino Alaniz DO (02/17 2323)      Postsurgical changes in the pelvis, status post ileal conduit  Masslike appearance near the small bowel anastomosis and incisional scarring in the right hemipelvis  There is an infiltrative appearance of the surrounding soft tissues  This could be related to scar tissue and fibrosis with adherent bowel loops and    associated collapse, although an associated mass (area measures approximately 4 5 x 5 8 x 5 0 cm in size) could be benign (such as a desmoid tumor) or malignant also considered  An abscess in this region is also considered  (Axial image 72, series 2)      Midline incisional scarring with body wall edema and some infiltration of the fat in the anterior pelvic wall which could be edema versus cellulitis depending on the clinical setting  No hydronephrosis        Coronary atherosclerosis, cholelithiasis without discrete evidence of acute cholecystitis, and other findings as above  Workstation performed: [id number]         XR chest 1 view portable    (Results Pending)         Procedures  ECG 12 Lead Documentation Only    Date/Time: 2/17/2021 9:07 PM  Performed by: Regina Olson DO  Authorized by: Regina Olson DO     Indications / Diagnosis:  Exertional dyspnea and generalized weakness  ECG reviewed by me, the ED Provider: yes    Previous ECG:     Previous ECG:  Compared to current    Comparison ECG info:  Twi in V2    Similarity:  Changes noted    Comparison to cardiac monitor: Yes    Interpretation:     Interpretation: abnormal    Rate:     ECG rate:  98    ECG rate assessment: normal    Rhythm:     Rhythm: A-V block    Ectopy:     Ectopy: none    QRS:     QRS axis:  Left    QRS intervals:  Normal  Conduction:     Conduction: abnormal      Abnormal conduction: 1st degree    ST segments:     ST segments:  Normal  T waves:     T waves: inverted      Inverted:  V1 and V2          ED Course  ED Course as of Feb 17 2344   Wed Feb 17, 2021   2145 Administered aspirin   Troponin I(!): 1 15   2205 LACTIC ACID: 1 9   2302 Temperature: 98 3 °F (36 8 °C)               Identification of Seniors at Risk      Most Recent Value   (ISAR) Identification of Seniors at Risk   Before the illness or injury that brought you to the Emergency, did you need someone to help you on a regular basis? 0 Filed at: 02/17/2021 2102   In the last 24 hours, have you needed more help than usual?  1 Filed at: 02/17/2021 2102   Have you been hospitalized for one or more nights during the past 6 months? 1 Filed at: 02/17/2021 2102   In general, do you see well?  0 Filed at: 02/17/2021 2102   In general, do you have serious problems with your memory? 0 Filed at: 02/17/2021 2102   Do you take more than three different medications every day?   1 Filed at: 02/17/2021 2102   ISAR Score  3 Filed at: 02/17/2021 2102                Initial Sepsis Screening     Row Name 02/17/21 1995 Is the patient's history suggestive of a new or worsening infection? (!) Yes (Proceed)  -DH        Suspected source of infection  acute abdominal infection  -DH        Are two or more of the following signs & symptoms of infection both present and new to the patient? (!) Yes (Proceed)  -DH        Indicate SIRS criteria  Tachycardia > 90 bpm;Leukocytosis (WBC > 95249 IJL)  -DH        If the answer is yes to both questions, suspicion of sepsis is present  --        If severe sepsis is present AND tissue hypoperfusion perists in the hour after fluid resuscitation or lactate > 4, the patient meets criteria for SEPTIC SHOCK  --        Are any of the following organ dysfunction criteria present within 6 hours of suspected infection and SIRS criteria that are NOT considered to be chronic conditions? (!) Yes  -DH        Organ dysfunction  Creatinine > 0 5 mg/dl ABOVE BASELINE  -DH        Date of presentation of severe sepsis  02/17/21  -        Time of presentation of severe sepsis  2332  [facility]        Tissue hypoperfusion persists in the hour after crystalloid fluid administration, evidenced, by either:  --        Was hypotension present within one hour of the conclusion of crystalloid fluid administration? No  -DH        Date of presentation of septic shock  --        Time of presentation of septic shock  --          User Key  (r) = Recorded By, (t) = Taken By, (c) = Cosigned By    [address] Name Provider Type    Conchis  32, DO Physician          SBIRT 20yo+      Most Recent Value   SBIRT (22 yo +)   In order to provide better care to our patients, we are screening all of our patients for alcohol and drug use  Would it be okay to ask you these screening questions?   Unable to answer at this time Filed at: 02/17/2021 2107                [facility]  Number of Diagnoses or Management Options  Abnormal CT of the abdomen:   Elevated troponin:   Leukocytosis:   Weakness:   Diagnosis management comments: Ct abdomen pelvis, blood work  Found to have elevated trop, vy, possible abscess vs mass on CT  Discussed with patient and admitting team   Given fluids, cef/flagyl, aspirin      Disposition  Final diagnoses:   Elevated troponin   Abnormal CT of the abdomen   Weakness   Leukocytosis     Time reflects when diagnosis was documented in both MDM as applicable and the Disposition within this note     Time User Action Codes Description Comment    2/17/2021 11:42 PM Veljorge Rae [R77 8] Elevated troponin     2/17/2021 11:43 PM Jennifer Jump Add [R93 5] Abnormal CT of the abdomen     2/17/2021 11:43 PM Jennifer Jump Add [R53 1] Weakness     2/17/2021 11:43 PM Jennifer Jump Add [Y24 731] Leukocytosis       ED Disposition     ED Disposition Condition Date/Time Comment    Admit Stable Wed Feb 17, 2021 11:42 PM Case was discussed with ANTONIO and the patient's admission status was agreed to be Admission Status: inpatient status to the service of Dr Luh Jesus  Follow-up Information    None         Patient's Medications   Discharge Prescriptions    No medications on file     No discharge procedures on file  PDMP Review     None           ED Provider  Attending physically available and evaluated Moise Canavan I managed the patient along with the ED Attending      Electronically Signed by         [employer], DO  02/17/21 5880

## 2021-02-18 NOTE — ASSESSMENT & PLAN NOTE
Elevation creatinine is at 3 59 from previous 2 27 and even as low as 1 19  Patient given fluid hydration of sodium chloride 0 9, continue with isolate 100 mL/hr  Metabolic profile for tomorrow

## 2021-02-18 NOTE — ASSESSMENT & PLAN NOTE
Lab Results   Component Value Date    HGBA1C 5 9 (H) 01/25/2021     Hemoglobin A1c; glucose checks per protocol with insulin sliding scale  No results for input(s): POCGLU in the last 72 hours      Blood Sugar Average: Last 72 hrs:

## 2021-02-18 NOTE — H&P (VIEW-ONLY)
Consults: UROLOGY  Darlene Gutierrez [de-identified] y o  male [id number]   Unit/Bed #: [facility] 723-01  Encounter: [id number]        Assessment  & Plan  :  Bladder cancer :  -patient diagnosed with high-grade T1 bladder cancer s/p cystectomy with ileal conduit October of 2020 at [place] [address]   -patient has been having nausea associated with decreased appetite weight loss and right lower quadrant abdominal pain  -CT scan without contrast of abdomen and pelvis shows masslike appearance near small bowel anastomosis and incisional scarring in the right hemipelvis  "There is an infiltrative appearance of the surrounding soft tissue  This could be related to the scar tissue and fibrosis with adherent bowel loops and associated collapse, although in associated mass could be benign or malignant also considered an abscess in this region is also considered  No hydronephrosis"  -MD to attest, patient will require exploratory surgery along with reanastomosis  To be determined whether patient would like to do this here or at [place] [address]    -leukocytosis may be physiologic in the presence of bladder cancer  -continue with medical optimization at this time  KO:  -baseline creatinine typically 1 4  Creatinine currently is 3 32, no hydronephrosis seen on CT scan  Patient denies any flank pain  Subjective :    Darlene Gutierrez  is a [de-identified] y o  male with a past urologic history of high-grade T1 bladder cancer s/p cystectomy with ileal conduit in October of 2020 in [place] [address]  Patient was previously managed by our practice known to Dr Rajni Drew  Patient states that he presented to the hospital due to fatigue and nausea  Patient states that he has been experiencing nausea since his cystectomy in October  He states that the smell of food is nauseating to him as well  Patient also reports that he has a decrease appetite as well as a weight loss of 50 lb most recently    Patient is also complaining of abdominal pain located below his ileoconduit in the right lower quadrant  He states that this has been going on for few months now  Patient is currently in the process of transitioning care from [facility] to our practice  He has an appointment in April with [facility]  Patient reports that he is feeling much better compared to yesterday  Patient is currently tolerating a diet  Denies any nausea presently  Patient states he is unable to take care of his ileal conduit on his own given his macular degeneration        Past Medical History:   Diagnosis Date    Acute kidney injury ([facility] )     Arthritis     Hands    Wright esophagus     BPH with obstruction/lower urinary tract symptoms     CAD (coronary artery disease)     Last assessed 09/16/15    Cancer ([facility] )     bladder    Cataract, acquired     Last assessed 10/10/17    Coronary artery disease     Diabetes mellitus ([facility] )     NIDDM    Diabetic neuropathy ([facility] )     Feet    Enlarged prostate with lower urinary tract symptoms (LUTS)     Last assessed 10/10/17    Erectile dysfunction     GERD (gastroesophageal reflux disease)     Last assessed 10/10/17    Hemoptysis     Last assessed 03/14/16    Hypercholesterolemia     Last assessed 10/10/17    Hypertension     Last assessed 10/10/17    Macular degeneration     Right eye is particularly affected    Myocardial infarction ([facility] ) 1998    OAB (overactive bladder)     Testicular hypofunction     Testicular hypogonadism     Last assessed 10/10/17    Tinnitus     Umbilical hernia     Last assessed 06/18/14    Urge incontinence of urine     Wears glasses      Past Surgical History:   Procedure Laterality Date    COLONOSCOPY      CORONARY ARTERY BYPASS GRAFT  07/16/2014    ABG x 4 LIMA to LAD,SVG to diagnoal 2 SVG to OM-1, SVG to PDA, resection of partial plerual mass    CYSTOSCOPY  2013    ESOPHAGOGASTRODUODENOSCOPY      FL RETROGRADE PYELOGRAM  12/20/2018    FL RETROGRADE PYELOGRAM  12/5/2019    INGUINAL HERNIA REPAIR Bilateral 2015    PHOTODYNAMIC THERAPY      For Barretts esophagus    OR COLONOSCOPY FLX DX W/COLLJ SPEC WHEN PFRMD N/A 4/25/2016    Procedure: COLONOSCOPY;  Surgeon: Sandrita Fuller MD;  Location:  GI LAB; Service: Gastroenterology    OR CYSTOURETHROSCOPY,BIOPSY N/A 9/10/2020    Procedure: CYSTOSCOPY, COLLECTION OF LEFT KIDNEY CYTOLOGY, BILATERAL RETROGRADE PYELOGRAM, BLADDER WALL BIOPSIES  AND [facility], RANDOM BLADDER TUMOR BIOPSIES;  Surgeon: Shyam Lafleur MD;  Location: [address] [place] [address] MAIN OR;  Service: Urology    OR CYSTOURETHROSCOPY,FULGUR 2-5 CM LESN N/A 4/16/2020    Procedure: CYSTOSCOPY, TRANSURETHRAL RESECTION OF BLADDER TUMOR (TURBT); Surgeon: Shyam Lafleur MD;  Location: [facility];  Service: Urology    OR CYSTOURETHROSCOPY,FULGUR <0 5 CM LESN N/A 12/5/2019    Procedure: CYSTO W/TURBT AND TRANSURETHRAL PROSTATE BIOPSY AND OPENING OF BLADDER NECK CONTRACTURE, B/L Retrograde pyelogram;  Surgeon: Shyam Lafleur MD;  Location[facility];  Service: Urology    TONSILLECTOMY      TRANSURETHRAL RESECTION OF PROSTATE N/A 12/20/2018    Procedure: CYSTO, PHOTO SELECTIVE VAPORIZATION OF PROSTATE, B/L RETROGRADE PYELOGRAM, TURBT;  Surgeon: Shyam Lafleur MD;  Location: [facility];  Service: Urology    UMBILICAL HERNIA REPAIR  2012    UPPER GASTROINTESTINAL ENDOSCOPY       Allergies   Allergen Reactions    Fentanyl Hallucinations    Morphine And Related Other (See Comments)     While having an MI patient received morphine and had adverse reaction but doesn't know what happened        Current Outpatient Medications   Medication Instructions    acetaminophen (TYLENOL) 1,000 mg, Oral    aspirin 81 mg, Oral, Daily    atorvastatin (LIPITOR) 40 mg, Oral, Daily at bedtime    Blood Glucose Monitoring Suppl (OneTouch Verio Flex System) w/Device KIT USE TO TEST BLOOD GLUCOSE DAILY    citalopram (CELEXA) 10 mg, Oral, Daily    citalopram (CELEXA) 20 mg, Oral, Daily    fluticasone (FLONASE) 50 mcg/act nasal spray 2 sprays, Nasal, Daily    irbesartan (AVAPRO) 300 mg tablet TAKE ONE TABLET BY MOUTH EVERY DAY    Lancets (OneTouch Delica Plus [id number]) MISC TEST BLOOD GLUCOSE ONCE DAILY    metFORMIN (GLUCOPHAGE-XR) 500 mg 24 hr tablet TAKE TWO TABLETS BY MOUTH TWICE A DAY WITH MEALS    metoprolol tartrate (LOPRESSOR) 50 mg tablet TAKE ONE TABLET BY MOUTH TWICE A DAY    Multiple Vitamins-Minerals (CENTRUM SILVER 50+MEN PO) 1 tablet, Oral, Daily    Multiple Vitamins-Minerals (OCUVITE-LUTEIN PO) 1 tablet, Oral, 2 times daily, Pt is taking preservision    nystatin (MYCOSTATIN) powder Topical, 3 times daily    Omega-3 Fatty Acids (fish oil) 1,000 mg Hold for 5 days from discharge    omeprazole (PRILOSEC) 40 mg, Oral, Daily at bedtime    OneTouch Verio test strip Daily, Test    traMADol (ULTRAM) 50 mg, Oral, Every 6 hours PRN    Vitamin D 2,000 Units, Oral, Daily    vitamin E (tocopherol) 400 Units, Oral, Daily          Review of Systems   Constitutional: Positive for appetite change and unexpected weight change  HENT: Negative  Eyes: Negative  Respiratory: Negative  Cardiovascular: Negative  Gastrointestinal: Positive for abdominal pain, nausea and vomiting  Negative for diarrhea  Pain below ileal conduit  Endocrine: Negative  Genitourinary: Negative for difficulty urinating, dysuria, flank pain, frequency, hematuria and urgency  Musculoskeletal: Negative  Skin: Negative  Allergic/Immunologic: Negative  Neurological: Negative  Hematological: Negative  Psychiatric/Behavioral: Negative  Objective     Physical Exam  Constitutional:       General: He is not in acute distress  Appearance: Normal appearance  He is normal weight  He is not ill-appearing or toxic-appearing  HENT:      Head: Normocephalic and atraumatic  Right Ear: External ear normal       Left Ear: External ear normal       Nose: Nose normal    Eyes:      General: No scleral icterus  Conjunctiva/sclera: Conjunctivae normal    Neck:      Musculoskeletal: Normal range of motion  Cardiovascular:      Rate and Rhythm: Normal rate and regular rhythm  Pulses: Normal pulses  Heart sounds: No murmur  No friction rub  No gallop  Pulmonary:      Effort: Pulmonary effort is normal       Breath sounds: No wheezing, rhonchi or rales  Abdominal:      General: Bowel sounds are normal  There is no distension  Palpations: Abdomen is soft  Tenderness: There is abdominal tenderness  There is no right CVA tenderness or left CVA tenderness  Comments: Abdomen is soft except for in right lower quadrant, along with tenderness in the left lower quadrant below patient's anastomosis  This is also more taut then the rest of his abdomen  Questionable mass or normal changes with ileal conduit? Ileal conduit present in the right upper quadrant, draining clear yellow urine  Musculoskeletal: Normal range of motion  Neurological:      General: No focal deficit present  Mental Status: He is alert and oriented to person, place, and time  Psychiatric:         Mood and Affect: Mood normal          Behavior: Behavior normal          Thought Content: Thought content normal          Judgment: Judgment normal                 Imaging:  CT scan of abdomen and pelvis without contrast    IMPRESSION:     Postsurgical changes in the pelvis, status post ileal conduit        Masslike appearance near the small bowel anastomosis and incisional scarring in the right hemipelvis  There is an infiltrative appearance of the surrounding soft tissues  This could be related to scar tissue and fibrosis with adherent bowel loops and   associated collapse, although an associated mass (area measures approximately 4 5 x 5 8 x 5 0 cm in size) could be benign (such as a desmoid tumor) or malignant also considered  An abscess in this region is also considered    (Axial image 72, series 2)     Midline incisional scarring with body wall edema and some infiltration of the fat in the anterior pelvic wall which could be edema versus cellulitis depending on the clinical setting      No hydronephrosis      Coronary atherosclerosis, cholelithiasis without discrete evidence of acute cholecystitis, and other findings as above    Labs:  Lab Results   Component Value Date    SODIUM 135 (L) 02/18/2021    K 4 5 02/18/2021     (H) 02/18/2021    CO2 12 (L) 02/18/2021    BUN 69 (H) 02/18/2021    CREATININE 3 32 (H) 02/18/2021    GLUC 109 02/18/2021    CALCIUM 7 7 (L) 02/18/2021         Lab Results   Component Value Date    WBC 16 57 (H) 02/18/2021    HGB 9 0 (L) 02/18/2021    HCT 27 7 (L) 02/18/2021    MCV 96 02/18/2021     02/18/2021         VTE Pharmacologic Prophylaxis: Heparin  VTE Mechanical Prophylaxis: sequential compression device     Mir Bunch PA-C

## 2021-02-18 NOTE — H&P
H&P- Babatunde Hidalgo 1940, [de-identified] y o  male MRN: [id number]    Unit/Bed#: ED 16 Encounter: [id number]    Primary Care Provider: Elvie Presley DO   Date and time admitted to hospital: 2/17/2021  8:52 PM        * Central abdominal mass  Assessment & Plan  Status post ileal conduit for bladder cancer; currently comes in with abdominal mass noticed for probably within the past 2 days  Vomiting x1  No fever  Elevated white count  That started cefepime with metronidazole  Refer to urology regarding nature of mass  (Louise Meek)  Noted that patient has ileal conduit was done in [facility] October of last year  Procalcitonin today and tomorrow  Lactic acid 1 9  With KO; aggressive hydration  Blood cultures  Elevated troponin  Assessment & Plan  YARIEL score 4  ACS rule out as mention  Coronary artery disease involving native coronary artery of native heart without angina pectoris  Assessment & Plan  Currently without chest discomfort but noted elevated troponin  Will do ACS rule out  Previously has elevated troponin of 0 08  Elevated troponin may also be secondary to hemoconcentration due to KO  Lipid profile, TSH and hemoglobin A1c   YARIEL score 4    History of bladder cancer  Assessment & Plan  Status post ileal conduit; will continue urology consult and opinion  Possibility of mass versus abscess:  Started on cefepime with metronidazole  Acute kidney injury [facility])  Assessment & Plan  Elevation creatinine is at 3 59 from previous 2 27 and even as low as 1 19  Patient given fluid hydration of sodium chloride 0 9, continue with isolate 100 mL/hr  Metabolic profile for tomorrow  Anxiety and depression  Assessment & Plan  Continue Celexa 20 mg daily      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)  Assessment & Plan  Lab Results   Component Value Date    HGBA1C 5 9 (H) 01/25/2021     Hemoglobin A1c; glucose checks per protocol with insulin sliding scale  No results for input(s): POCGLU in the last 72 hours  Blood Sugar Average: Last 72 hrs:      Wright's esophagus with esophagitis  Assessment & Plan  Continue pantoprazole 40 mg daily    Hyperlipidemia  Assessment & Plan  Atorvastatin 40 mg to continue    Benign essential hypertension  Assessment & Plan  Continue metoprolol tartrate 50 mg b i d  Losartan 100 mg daily        VTE Prophylaxis: Enoxaparin (Lovenox)  / sequential compression device   Code Status: Level 1 - Full Code   POLST: There is no POLST form on file for this patient (pre-hospital)    Anticipated Length of Stay:  Patient will be admitted on an Inpatient basis with an anticipated length of stay of  greater than 2 midnights  Justification for Hospital Stay: Please see detailed plans noted above  Chief Complaint:     Abdominal discomfort with possible abdominal mass; elevated white count  Nausea  History of Present Illness:  Domenica Ganser is a [de-identified] y o  male who has a past medical history significant for CAD with history of CABG and MI  He also has ischemic cardiomyopathy  The patient was also diagnosed to have lateral wall bladder cancer status post resection with ileal conduit  Likewise, he has previous and active medical problems including benign essential hypertension, diabetes mellitus type 2, hyperlipidemia, gastroesophageal reflux disease, and Wright's esophagus  He also has chronic kidney disease stage 3  Patient comes in for a 1 to 2 day history of increasing abdominal discomfort found at the lower abdomen and somewhat with the feeling of a beginning masslike structure just beneath the ileal conduit  Patient states that he threw up after 1 spoon full of macaroni and cheese the night before  However this morning, he was able to have a farzana milkshake without any problem  Patient states that he had nausea and vomiting that 1 time none ever since  No fever    But since the patient was developing abdominal discomfort, the patient went to the emergency room to be evaluated  Currently he is not in extreme pain  He does not have any chest discomfort  CT scan of the abdomen does show the presence of a mass the nature of which is unclear but it could be an abscess as opposed to an associated mass possibly malignant or a desmoid tumor  No chills  No changes in bowel habits  He did have the nausea and vomiting x1 as mention  Currently he is resting well in the room lying flat on bed without any cardiorespiratory discomfort  Because of the possibility that it may be an abscess plus the fact patient has elevated white count of 21 36; lactic acid 1 9; patient is placed on antibiotics metronidazole with cefepime  Noted to is that the patient has elevated troponin 1 15 however he does have KO on CKD  Patient denies any chest pain at this time  Review of Systems:    Constitutional:  Denies fever or chills   Eyes:  Denies change in visual acuity   HENT:  Denies nasal congestion or sore throat   Respiratory:  Denies cough or shortness of breath   Cardiovascular:  Denies chest pain or edema   GI:  Abdominal discomfort as noted above with nausea and vomiting x1, currently he is comfortable  No changes in bowel habits    CT scan does not mention any obstruction  :  Denies dysuria   Musculoskeletal:  Denies back pain or joint pain   Integument:  Denies rash   Neurologic:  Denies headache, focal weakness or sensory changes   Endocrine:  Denies polyuria or polydipsia   Lymphatic:  Denies swollen glands   Psychiatric:  Denies depression or anxiety     Past Medical and Surgical History:   Past Medical History:   Diagnosis Date    Acute kidney injury ([facility] [address] )     Arthritis     Hands    Wright esophagus     BPH with obstruction/lower urinary tract symptoms     CAD (coronary artery disease)     Last assessed 09/16/15    Cancer [facility])     bladder    Cataract, acquired     Last assessed 10/10/17    Coronary artery disease     Diabetes mellitus ([facility] [address] )     NIDDM    Diabetic neuropathy (HCC)     Feet    Enlarged prostate with lower urinary tract symptoms (LUTS)     Last assessed 10/10/17    Erectile dysfunction     GERD (gastroesophageal reflux disease)     Last assessed 10/10/17    Hemoptysis     Last assessed 03/14/16    Hypercholesterolemia     Last assessed 10/10/17    Hypertension     Last assessed 10/10/17    Macular degeneration     Right eye is particularly affected    Myocardial infarction ([facility] [address] ) 1998    OAB (overactive bladder)     Testicular hypofunction     Testicular hypogonadism     Last assessed 10/10/17    Tinnitus     Umbilical hernia     Last assessed 06/18/14    Urge incontinence of urine     Wears glasses      Past Surgical History:   Procedure Laterality Date    COLONOSCOPY      CORONARY ARTERY BYPASS GRAFT  07/16/2014    ABG x 4 LIMA to LAD,SVG to diagnoal 2 SVG to OM-1, SVG to PDA, resection of partial plerual mass    CYSTOSCOPY  2013    ESOPHAGOGASTRODUODENOSCOPY      FL RETROGRADE PYELOGRAM  12/20/2018    FL RETROGRADE PYELOGRAM  12/5/2019    INGUINAL HERNIA REPAIR Bilateral 2015    PHOTODYNAMIC THERAPY      For Barretts esophagus    OH COLONOSCOPY FLX DX W/COLLJ SPEC WHEN PFRMD N/A 4/25/2016    Procedure: COLONOSCOPY;  Surgeon: Lei Swann MD;  Location:  GI LAB; Service: Gastroenterology    OH CYSTOURETHROSCOPY,BIOPSY N/A 9/10/2020    Procedure: CYSTOSCOPY, COLLECTION OF LEFT KIDNEY CYTOLOGY, BILATERAL RETROGRADE PYELOGRAM, BLADDER WALL BIOPSIES  AND [address], RANDOM BLADDER TUMOR BIOPSIES;  Surgeon: Lenard Menard MD;  Location: [address] [place] [address];  Service: Urology    OH CYSTOURETHROSCOPY,FULGUR 2-5 CM LESN N/A 4/16/2020    Procedure: CYSTOSCOPY, TRANSURETHRAL RESECTION OF BLADDER TUMOR (TURBT);   Surgeon: Lenard Menard MD;  Location: [facility] OR;  Service: Urology    OH CYSTOURETHROSCOPY,FULGUR <0 5 CM LESN N/A 12/5/2019    Procedure: CYSTO W/TURBT AND TRANSURETHRAL PROSTATE BIOPSY AND OPENING OF BLADDER NECK CONTRACTURE, B/L Retrograde pyelogram;  Surgeon: Huang Rae MD;  [facility];  Service: Urology    TONSILLECTOMY      TRANSURETHRAL RESECTION OF PROSTATE N/A 12/20/2018    Procedure: CYSTO, PHOTO SELECTIVE VAPORIZATION OF PROSTATE, B/L RETROGRADE PYELOGRAM, TURBT;  Surgeon: Huang Rae MD;  [facility];  Service: Urology    UMBILICAL HERNIA REPAIR  2012    UPPER GASTROINTESTINAL ENDOSCOPY         Meds/Allergies:  (Not in a hospital admission)    acetaminophen (TYLENOL) 500 mg tablet Take 1,000 mg by mouth Dewayne Husain MD Reordered   Ordered as: acetaminophen (TYLENOL) tablet 750 mg - 750 mg (rounded from 650 mg), Oral, Every 6 hours PRN, mild pain, fever, Starting Thu 2/18/21 at 0024   aspirin 81 mg chewable tablet Chew 81 mg daily Dewayne Husain MD Reordered   Ordered as: aspirin chewable tablet 81 mg - 81 mg, Oral, Daily, First dose on Thu 2/18/21 at 0900   atorvastatin (LIPITOR) 40 mg tablet Take 1 tablet (40 mg total) by mouth daily at bedtime Dewayne Husain MD Reordered   Ordered as: atorvastatin (LIPITOR) tablet 40 mg - 40 mg, Oral, Daily at bedtime, First dose on Thu 2/18/21 at [facility]   Blood Glucose Monitoring Suppl (OneTouch Verio Flex System) w/Device KIT USE TO TEST BLOOD GLUCOSE DAILY Dewayne Husain MD Reconciliation Not Required   Cholecalciferol (VITAMIN D) 50 MCG (2000 UT) tablet Take 2,000 Units by mouth daily Dewayne Husain MD Reordered   Ordered as: Vitamin D 2,000 Units - 2,000 Units, Oral, Daily, First dose on Thu 2/18/21 at 0900   citalopram (CeleXA) 10 mg tablet Take 1 tablet (10 mg total) by mouth daily Dewayne Husain MD Not Ordered   citalopram (CeleXA) 20 mg tablet Take 1 tablet (20 mg total) by mouth daily Dewayne Husain MD Reordered   Ordered as: citalopram (CeleXA) tablet 20 mg - 20 mg, Oral, Daily, First dose on Thu 2/18/21 at 0900 Look alike sound alike      fluticasone (FLONASE) 50 mcg/act nasal spray 2 sprays into each nostril daily Allen Vital MD Reordered   Ordered as: fluticasone (FLONASE) 50 mcg/act nasal spray 2 spray - 2 spray, Nasal, Daily, First dose on Thu 2/18/21 at 0900 LOOK ALIKE SOUND ALIKE MED    irbesartan (AVAPRO) 300 mg tablet TAKE ONE TABLET BY MOUTH EVERY DAY Allen Vital MD Reordered   Ordered as: losartan (COZAAR) tablet 100 mg - 100 mg, Oral, Daily, First dose on Thu 2/18/21 at 2873 Hold for systolic blood pressure less than (mmHg): 110   Lancets (OneTouch Delica Plus [id number]) MISC TEST BLOOD GLUCOSE ONCE DAILY Allen Vital MD Reconciliation Not Required   metFORMIN (GLUCOPHAGE-XR) 500 mg 24 hr tablet TAKE TWO TABLETS BY MOUTH TWICE A DAY WITH MEALS Allen Vital MD Not Ordered   metoprolol tartrate (LOPRESSOR) 50 mg tablet TAKE ONE TABLET BY MOUTH TWICE A DAY Allen Vital MD Reordered   Ordered as: metoprolol tartrate (LOPRESSOR) tablet 50 mg - 50 mg, Oral, 2 times daily, First dose on Thu 2/18/21 at 0900 Hold for heart rate less than 50 beats per minute   LOOK ALIKE SOUND ALIKE MED Hold for systolic blood pressure less than (mmHg): 110   Multiple Vitamins-Minerals (CENTRUM SILVER 50+MEN PO) Take 1 tablet by mouth daily   Allen Vital MD Reordered   Ordered as: multivitamin-minerals (CENTRUM) tablet 1 tablet - 1 tablet, Oral, Daily, First dose on Thu 2/18/21 at 0900 **DISPOSE IN 8 GALLON BLACK CONTAINER**    Multiple Vitamins-Minerals (OCUVITE-LUTEIN PO) Take 1 tablet by mouth 2 (two) times a day Pt is taking preservision Allen Vital MD Not Ordered   nystatin (MYCOSTATIN) powder Apply topically 3 (three) times a day Allen Vital MD Reordered   Ordered as: nystatin (MYCOSTATIN) powder - Topical, 3 times daily, First dose on u 2/18/21 at [facility] [address]  (fish oil) 1,000 mg Hold for 5 days from discharge Dorrene Vital, MD Reordered   Ordered as: fish oil capsule 1,000 mg - 1,000 mg, Oral, Daily, First dose on Thu 2/18/21 at 0900 Swallow whole; do not break, crush, dissolve, or chew  omeprazole (PriLOSEC) 40 MG capsule Take 40 mg by mouth daily at bedtime   Thierry Ludwig MD Reordered   Ordered as: pantoprazole (PROTONIX) EC tablet 40 mg - 40 mg, Oral, Daily (early morning), First dose on Thu 2/18/21 at 0600 Swallow whole; do not crush, chew or split  LOOK ALIKE SOUND ALIKE MED    OneTouch Verio test strip daily Test Thierry Ludwig MD Not Ordered   traMADol (ULTRAM) 50 mg tablet Take 50 mg by mouth every 6 (six) hours as needed Thierry Ludwig MD Reordered   Ordered as: traMADol (ULTRAM) tablet 50 mg - 50 mg, Oral, Every 6 hours PRN, moderate pain, Starting Thu 2/18/21 at 0024 High Alert Medication  LOOK ALIKE SOUND ALIKE MED    vitamin E, tocopherol, 400 units capsule Take 400 Units by mouth daily Thierry Ludwig MD Reordered   Ordered as: vitamin E (tocopherol) capsule 400 Units - 400 Units, Oral, Daily, First dose on Thu 2/18/21 at 0900         Allergies: Allergies   Allergen Reactions    Fentanyl Hallucinations    Morphine And Related Other (See Comments)     While having an MI patient received morphine and had adverse reaction but doesn't know what happened       History:  Marital Status: /Civil Union   Occupation:  Retired realtor  Patient Pre-hospital Living Situation:  Lives at home  Patient Pre-hospital Level of Mobility:  Ambulatory  Patient Pre-hospital Diet Restrictions:  Regular diet  Substance Use History:   Social History     Substance and Sexual Activity   Alcohol Use Yes    Alcohol/week: 2 0 standard drinks    Types: 1 Glasses of wine, 1 Cans of beer per week    Drinks per session: 1 or 2    Binge frequency: Daily or almost daily    Comment: 1 drink daily- a mixed drink or wine Last 7/4/2020     Social History     Tobacco Use   Smoking Status Former Smoker    Packs/day: 1 00    Years: 13 00    Pack years: 13 00    Types: Cigarettes    Quit date: 0    Years since quittin 1   Smokeless Tobacco Never Used     Social History     Substance and Sexual Activity   Drug Use No       Family History:  Family History   Problem Relation Age of Onset    Cancer Mother     Other Mother         Digestive System Complications    Diabetes Father     Heart disease Father     Hypertension Father     Coronary artery disease Father     Hyperlipidemia Father        Physical Exam:     Vitals:   Blood Pressure: 138/63 (21)  Pulse: 96 (21)  Temperature: 98 3 °F (36 8 °C) (21)  Temp Source: Oral (21 5675)  Respirations: 18 (21)  Weight - Scale: 68 kg (150 lb) (21)  SpO2: 96 % (21)    Constitutional:  Well developed, well nourished, no acute distress, non-toxic appearance lying flat on bed without any difficulty breathing and can converse  Eyes:  PERRL, conjunctiva normal   HENT:  Atraumatic, external ears normal, nose normal, oropharynx dry no pharyngeal exudates  Neck- normal range of motion, no tenderness, supple   Respiratory:  No respiratory distress, normal breath sounds, no rales, no wheezing   Cardiovascular:  Normal rate, normal rhythm, no murmurs, no gallops, no rubs   GI:  Soft, nondistended, normal bowel sounds, nontender, no organomegaly, no mass, no rebound, no guarding with note of ileal conduit draining clear urine; noted a tough ovoid mass just beneath the ileal conduit location  Despite the mass, the patient does not appear to be tender  :  No costovertebral angle tenderness   Musculoskeletal:  No edema, no tenderness, no deformities  Back- no tenderness  Integument:  Well hydrated, no rash   Lymphatic:  No lymphadenopathy noted   Neurologic:  Alert &awake, communicative, CN 2-12 normal, normal motor function, normal sensory function, no focal deficits noted   Psychiatric:  Speech and behavior appropriate       Lab Results: I have personally reviewed pertinent reports        Results from last 7 days   Lab Units 02/17/21  2107   WBC Thousand/uL 21 36*   HEMOGLOBIN g/dL 12 1   HEMATOCRIT % 36 4*   PLATELETS Thousands/uL 236   NEUTROS PCT % 81*   LYMPHS PCT % 5*   MONOS PCT % 11   EOS PCT % 1     Results from last 7 days   Lab Units 02/17/21  2107   POTASSIUM mmol/L 4 7   CHLORIDE mmol/L 106   CO2 mmol/L 13*   BUN mg/dL 72*   CREATININE mg/dL 3 59*   CALCIUM mg/dL 8 7   ALK PHOS U/L 98   ALT U/L 25   AST U/L 22               Imaging: I have personally reviewed pertinent reports  Ct Abdomen Pelvis Wo Contrast    Result Date: 2/17/2021  Narrative: CT ABDOMEN AND PELVIS WITHOUT IV CONTRAST INDICATION:   RLQ abdominal pain with decreased appetite and weight loss  COMPARISON:  CT abdomen and pelvis dated 11/25/2020 TECHNIQUE:  CT examination of the abdomen and pelvis was performed without intravenous contrast   Axial, sagittal, and coronal 2D reformatted images were created from the source data and submitted for interpretation  Radiation dose length product (DLP) for this visit:  460 98 mGy-cm   This examination, like all CT scans performed in the [facility], was performed utilizing techniques to minimize radiation dose exposure, including the use of iterative  reconstruction and automated exposure control  Enteric contrast was administered  FINDINGS: ABDOMEN LOWER CHEST:  No clinically significant abnormality identified in the visualized lower chest   Coronary atherosclerosis  The heart appears normal in size  Median sternotomy changes LIVER/BILIARY TREE:  Unremarkable  GALLBLADDER:  There are gallstone(s) within the gallbladder, without pericholecystic inflammatory changes  SPLEEN:  Unremarkable  PANCREAS:  Unremarkable  ADRENAL GLANDS:  Unremarkable  KIDNEYS/URETERS:  Grossly unremarkable; no hydronephrosis STOMACH AND BOWEL:  Postsurgical changes in the pelvis, status post ileal conduit    Masslike appearance near the small bowel anastomosis and incisional scarring in the right hemipelvis  There is an infiltrative appearance of the surrounding soft tissues  This could be related to scar tissue and fibrosis with adherent bowel loops and associated collapse, although an associated mass (area measures approximately 4 5 x 5 8 x 5 0 cm in size) could be benign (such as a desmoid tumor) or malignant also considered  An abscess in this region is also considered (axial image 72, series 2)  There is no discrete evidence of bowel obstruction, a malignant neoplasm is felt to be less likely  APPENDIX:  No findings to suggest appendicitis  ABDOMINOPELVIC CAVITY:  No ascites  No pneumoperitoneum  No lymphadenopathy  VESSELS:  Moderate atherosclerosis; no aortic aneurysm PELVIS REPRODUCTIVE ORGANS/BLADDER:  Postsurgical changes in the pelvis, the patient is status post cystoprostatectomy  An ileal conduit is noted in the right hemipelvis  ABDOMINAL WALL/INGUINAL REGIONS:  Midline incisional scarring with body wall edema and some infiltration of the fat in the anterior pelvic wall which could be edema versus cellulitis depending on the clinical setting OSSEOUS STRUCTURES:  Multilevel degenerative changes of the spine  Degenerative changes of bilateral hips  No acute fracture or destructive osseous lesion  Impression: Postsurgical changes in the pelvis, status post ileal conduit  Masslike appearance near the small bowel anastomosis and incisional scarring in the right hemipelvis  There is an infiltrative appearance of the surrounding soft tissues  This could be related to scar tissue and fibrosis with adherent bowel loops and associated collapse, although an associated mass (area measures approximately 4 5 x 5 8 x 5 0 cm in size) could be benign (such as a desmoid tumor) or malignant also considered  An abscess in this region is also considered    (Axial image 72, series 2) Midline incisional scarring with body wall edema and some infiltration of the fat in the anterior pelvic wall which could be edema versus cellulitis depending on the clinical setting  No hydronephrosis  Coronary atherosclerosis, cholelithiasis without discrete evidence of acute cholecystitis, and other findings as above  Workstation performed: [id number]         ** Please Note: Dragon 360 Dictation voice to text software was used in the creation of this document   **

## 2021-02-18 NOTE — ASSESSMENT & PLAN NOTE
Currently without chest discomfort but noted elevated troponin  Will do ACS rule out  Previously has elevated troponin of 0 08  Elevated troponin may also be secondary to hemoconcentration due to KO    Lipid profile, TSH and hemoglobin A1c   YARIEL score 4

## 2021-02-18 NOTE — PLAN OF CARE
Problem: Potential for Falls  Goal: Patient will remain free of falls  Description: INTERVENTIONS:  - Assess patient frequently for physical needs  -  Identify cognitive and physical deficits and behaviors that affect risk of falls    -  [place] fall precautions as indicated by assessment   - Educate patient/family on patient safety including physical limitations  - Instruct patient to call for assistance with activity based on assessment  - Modify environment to reduce risk of injury  - Consider OT/PT consult to assist with strengthening/mobility  Outcome: Progressing     Problem: PAIN - ADULT  Goal: Verbalizes/displays adequate comfort level or baseline comfort level  Description: Interventions:  - Encourage patient to monitor pain and request assistance  - Assess pain using appropriate pain scale  - Administer analgesics based on type and severity of pain and evaluate response  - Implement non-pharmacological measures as appropriate and evaluate response  - Consider cultural and social influences on pain and pain management  - Notify physician/advanced practitioner if interventions unsuccessful or patient reports new pain  Outcome: Progressing     Problem: INFECTION - ADULT  Goal: Absence or prevention of progression during hospitalization  Description: INTERVENTIONS:  - Assess and monitor for signs and symptoms of infection  - Monitor lab/diagnostic results  - Monitor all insertion sites, i e  indwelling lines, tubes, and drains  - Monitor endotracheal if appropriate and nasal secretions for changes in amount and color  - [place] appropriate cooling/warming therapies per order  - Administer medications as ordered  - Instruct and encourage patient and family to use good hand hygiene technique  - Identify and instruct in appropriate isolation precautions for identified infection/condition  Outcome: Progressing  Goal: Absence of fever/infection during neutropenic period  Description: INTERVENTIONS:  - Monitor WBC    Outcome: Progressing     Problem: SAFETY ADULT  Goal: Patient will remain free of falls  Description: INTERVENTIONS:  - Assess patient frequently for physical needs  -  Identify cognitive and physical deficits and behaviors that affect risk of falls    -  [place] fall precautions as indicated by assessment   - Educate patient/family on patient safety including physical limitations  - Instruct patient to call for assistance with activity based on assessment  - Modify environment to reduce risk of injury  - Consider OT/PT consult to assist with strengthening/mobility  Outcome: Progressing  Goal: Maintain or return to baseline ADL function  Description: INTERVENTIONS:  -  Assess patient's ability to carry out ADLs; assess patient's baseline for ADL function and identify physical deficits which impact ability to perform ADLs (bathing, care of mouth/teeth, toileting, grooming, dressing, etc )  - Assess/evaluate cause of self-care deficits   - Assess range of motion  - Assess patient's mobility; develop plan if impaired  - Assess patient's need for assistive devices and provide as appropriate  - Encourage maximum independence but intervene and supervise when necessary  - Involve family in performance of ADLs  - Assess for home care needs following discharge   - Consider OT consult to assist with ADL evaluation and planning for discharge  - Provide patient education as appropriate  Outcome: Progressing  Goal: Maintain or return mobility status to optimal level  Description: INTERVENTIONS:  - Assess patient's baseline mobility status (ambulation, transfers, stairs, etc )    - Identify cognitive and physical deficits and behaviors that affect mobility  - Identify mobility aids required to assist with transfers and/or ambulation (gait belt, sit-to-stand, lift, walker, cane, etc )  - [place] fall precautions as indicated by assessment  - Record patient progress and toleration of activity level on Mobility SBAR; progress patient to next Phase/Stage  - Instruct patient to call for assistance with activity based on assessment  - Consider rehabilitation consult to assist with strengthening/weightbearing, etc   Outcome: Progressing     Problem: DISCHARGE PLANNING  Goal: Discharge to home or other facility with appropriate resources  Description: INTERVENTIONS:  - Identify barriers to discharge w/patient and caregiver  - Arrange for needed discharge resources and transportation as appropriate  - Identify discharge learning needs (meds, wound care, etc )  - Arrange for interpretive services to assist at discharge as needed  - Refer to Case Management Department for coordinating discharge planning if the patient needs post-hospital services based on physician/advanced practitioner order or complex needs related to functional status, cognitive ability, or social support system  Outcome: Progressing     Problem: Knowledge Deficit  Goal: Patient/family/caregiver demonstrates understanding of disease process, treatment plan, medications, and discharge instructions  Description: Complete learning assessment and assess knowledge base    Interventions:  - Provide teaching at level of understanding  - Provide teaching via preferred learning methods  Outcome: Progressing

## 2021-02-18 NOTE — ASSESSMENT & PLAN NOTE
Status post ileal conduit for bladder cancer; currently comes in with abdominal mass noticed for probably within the past 2 days  Vomiting x1  No fever  Elevated white count  That started cefepime with metronidazole  Refer to urology regarding nature of mass  (Charity Lynn)  Noted that patient has ileal conduit was done in [facility] October of last year  Procalcitonin today and tomorrow  Lactic acid 1 9  With KO; aggressive hydration  Blood cultures

## 2021-02-18 NOTE — MALNUTRITION/BMI
This medical record reflects one or more clinical indicators suggestive of malnutrition     Malnutrition Findings:   Adult Malnutrition type: Chronic illness(related to disease/condition <75% energy intake versus neeeds for > 1 month as evidenced by 42#(21%) wt loss since 5/11/20 Treating with CCD3 diet & Glucerna BID as po supplement )  Adult Degree of Malnutrition: Other severe protein calorie malnutrition  Malnutrition Characteristics: Inadequate energy, Weight loss    BMI Findings: Body mass index is 24 07 kg/m²  See Nutrition note dated 02/18/2021   for additional details  Completed nutrition assessment is viewable in the nutrition documentation

## 2021-02-18 NOTE — ASSESSMENT & PLAN NOTE
Status post ileal conduit; will continue urology consult and opinion  Possibility of mass versus abscess:  Started on cefepime with metronidazole

## 2021-02-18 NOTE — SEPSIS NOTE
Sepsis Note   Jessica Rivers [de-identified] y o  male MRN: [id number]  Unit/Bed#: ED 16 Encounter: [id number]      qSOFA     Row Name 02/17/21 2145 02/17/21 2103 02/17/21 2058          Altered mental status GCS < 15  --  0  --      Respiratory Rate > / =22  0  --  0      Systolic BP < / =281  0  --  0      Q Sofa Score  0  0  0          Initial Sepsis Screening     Row Name 02/17/21 2332                Is the patient's history suggestive of a new or worsening infection? (!) Yes (Proceed)  -DH        Suspected source of infection  acute abdominal infection  -DH        Are two or more of the following signs & symptoms of infection both present and new to the patient? (!) Yes (Proceed)  -DH        Indicate SIRS criteria  Tachycardia > 90 bpm;Leukocytosis (WBC > 78383 IJL)  -        If the answer is yes to both questions, suspicion of sepsis is present  --        If severe sepsis is present AND tissue hypoperfusion perists in the hour after fluid resuscitation or lactate > 4, the patient meets criteria for SEPTIC SHOCK  --        Are any of the following organ dysfunction criteria present within 6 hours of suspected infection and SIRS criteria that are NOT considered to be chronic conditions? (!) Yes  -DH        Organ dysfunction  Creatinine > 0 5 mg/dl ABOVE BASELINE  -        Date of presentation of severe sepsis  02/17/21  -        Time of presentation of severe sepsis  formerly [facility]  [facility]        Tissue hypoperfusion persists in the hour after crystalloid fluid administration, evidenced, by either:  --        Was hypotension present within one hour of the conclusion of crystalloid fluid administration?   No  -DH        Date of presentation of septic shock  --        Time of presentation of septic shock  --          User Key  (r) = Recorded By, (t) = Taken By, (c) = Cosigned By    [address] Name Provider Type    Conchis  32, DO Physician

## 2021-02-18 NOTE — ED ATTENDING ATTESTATION
2/17/2021  IAmara DO, saw and evaluated the patient  I have discussed the patient with the resident/non-physician practitioner and agree with the resident's/non-physician practitioner's findings, Plan of Care, and MDM as documented in the resident's/non-physician practitioner's note, except where noted  All available labs and Radiology studies were reviewed  I was present for key portions of any procedure(s) performed by the resident/non-physician practitioner and I was immediately available to provide assistance  At this point I agree with the current assessment done in the Emergency Department  I have conducted an independent evaluation of this patient a history and physical is as follows:    80-year-old male presents with weakness  Patient reports generalized weakness over the past few months but significantly worse over the past few weeks  Has history of bladder cancer with resection and ileal conduit  No fevers, no chills, no cough or chest pain  Does have some discomfort near the stoma site  Has had normal urine output  Wife reports he has fallen a few times secondary to this weakness  Patient denies head injury  On exam-no acute distress, heart mildly tachycardic, no respiratory distress, abdomen soft with mild tenderness around the stoma, urine is in the bag  No lower extremity edema, no calf tenderness  Plan- CT abdomen, check labs    Plan for admission    ED Course         Critical Care Time  Procedures

## 2021-02-18 NOTE — CONSULTS
Consults: UROLOGY  José Pruitt [de-identified] y o  male [id number]   Unit/Bed #: [facility] 723-01  Encounter: [id number]        Assessment  & Plan  :  Bladder cancer :  -patient diagnosed with high-grade T1 bladder cancer s/p cystectomy with ileal conduit October of 2020 at [facility]   -patient has been having nausea associated with decreased appetite weight loss and right lower quadrant abdominal pain  -CT scan without contrast of abdomen and pelvis shows masslike appearance near small bowel anastomosis and incisional scarring in the right hemipelvis  "There is an infiltrative appearance of the surrounding soft tissue  This could be related to the scar tissue and fibrosis with adherent bowel loops and associated collapse, although in associated mass could be benign or malignant also considered an abscess in this region is also considered  No hydronephrosis"  -MD to attest, patient will require exploratory surgery along with reanastomosis  To be determined whether patient would like to do this here or at [facility]    -leukocytosis may be physiologic in the presence of bladder cancer  -continue with medical optimization at this time  KO:  -baseline creatinine typically 1 4  Creatinine currently is 3 32, no hydronephrosis seen on CT scan  Patient denies any flank pain  Subjective :    José Pruitt  is a [de-identified] y o  male with a past urologic history of high-grade T1 bladder cancer s/p cystectomy with ileal conduit in October of 2020 in [facility]  Patient was previously managed by our practice known to Dr Aleksandr Olmos  Patient states that he presented to the hospital due to fatigue and nausea  Patient states that he has been experiencing nausea since his cystectomy in October  He states that the smell of food is nauseating to him as well  Patient also reports that he has a decrease appetite as well as a weight loss of 50 lb most recently    Patient is also complaining of abdominal pain located below his ileoconduit in the right lower quadrant  He states that this has been going on for few months now  Patient is currently in the process of transitioning care from [facility] to our practice  He has an appointment in April with Gay  81  Patient reports that he is feeling much better compared to yesterday  Patient is currently tolerating a diet  Denies any nausea presently  Patient states he is unable to take care of his ileal conduit on his own given his macular degeneration        Past Medical History:   Diagnosis Date    Acute kidney injury ([facility] )     Arthritis     Hands    Wright esophagus     BPH with obstruction/lower urinary tract symptoms     CAD (coronary artery disease)     Last assessed 09/16/15    Cancer [facility] )     bladder    Cataract, acquired     Last assessed 10/10/17    Coronary artery disease     Diabetes mellitus ([facility] )     NIDDM    Diabetic neuropathy ([facility] )     Feet    Enlarged prostate with lower urinary tract symptoms (LUTS)     Last assessed 10/10/17    Erectile dysfunction     GERD (gastroesophageal reflux disease)     Last assessed 10/10/17    Hemoptysis     Last assessed 03/14/16    Hypercholesterolemia     Last assessed 10/10/17    Hypertension     Last assessed 10/10/17    Macular degeneration     Right eye is particularly affected    Myocardial infarction ([facility] ) 1998    OAB (overactive bladder)     Testicular hypofunction     Testicular hypogonadism     Last assessed 10/10/17    Tinnitus     Umbilical hernia     Last assessed 06/18/14    Urge incontinence of urine     Wears glasses      Past Surgical History:   Procedure Laterality Date    COLONOSCOPY      CORONARY ARTERY BYPASS GRAFT  07/16/2014    ABG x 4 LIMA to LAD,SVG to diagnoal 2 SVG to OM-1, SVG to PDA, resection of partial plerual mass    CYSTOSCOPY  2013    ESOPHAGOGASTRODUODENOSCOPY      FL RETROGRADE PYELOGRAM  12/20/2018    FL RETROGRADE PYELOGRAM  12/5/2019    INGUINAL HERNIA REPAIR Bilateral 2015    PHOTODYNAMIC THERAPY      For Barretts esophagus    ND COLONOSCOPY FLX DX W/COLLJ SPEC WHEN PFRMD N/A 4/25/2016    Procedure: COLONOSCOPY;  Surgeon: Avril Atkinson MD;  Location:  GI LAB; Service: Gastroenterology    ND CYSTOURETHROSCOPY,BIOPSY N/A 9/10/2020    Procedure: CYSTOSCOPY, COLLECTION OF LEFT KIDNEY CYTOLOGY, BILATERAL RETROGRADE PYELOGRAM, BLADDER WALL BIOPSIES  AND Silvia American, RANDOM BLADDER TUMOR BIOPSIES;  Surgeon: Colette Max MD;  Location: [address] [place] [address] MAIN OR;  Service: Urology    ND CYSTOURETHROSCOPY,FULGUR 2-5 CM LESN N/A 4/16/2020    Procedure: CYSTOSCOPY, TRANSURETHRAL RESECTION OF BLADDER TUMOR (TURBT); Surgeon: Colette Max MD;  [facility];  Service: Urology    ND CYSTOURETHROSCOPY,FULGUR <0 5 CM LESN N/A 12/5/2019    Procedure: CYSTO W/TURBT AND TRANSURETHRAL PROSTATE BIOPSY AND OPENING OF BLADDER NECK CONTRACTURE, B/L Retrograde pyelogram;  Surgeon: Colette Max MD;  [facility];  Service: Urology    TONSILLECTOMY      TRANSURETHRAL RESECTION OF PROSTATE N/A 12/20/2018    Procedure: CYSTO, PHOTO SELECTIVE VAPORIZATION OF PROSTATE, B/L RETROGRADE PYELOGRAM, TURBT;  Surgeon: Colette Max MD;  [facility];  Service: Urology    UMBILICAL HERNIA REPAIR  2012    UPPER GASTROINTESTINAL ENDOSCOPY       Allergies   Allergen Reactions    Fentanyl Hallucinations    Morphine And Related Other (See Comments)     While having an MI patient received morphine and had adverse reaction but doesn't know what happened        Current Outpatient Medications   Medication Instructions    acetaminophen (TYLENOL) 1,000 mg, Oral    aspirin 81 mg, Oral, Daily    atorvastatin (LIPITOR) 40 mg, Oral, Daily at bedtime    Blood Glucose Monitoring Suppl (OneTouch Verio Flex System) w/Device KIT USE TO TEST BLOOD GLUCOSE DAILY    citalopram (CELEXA) 10 mg, Oral, Daily    citalopram (CELEXA) 20 mg, Oral, Daily    fluticasone (FLONASE) 50 mcg/act nasal spray 2 sprays, Nasal, Daily    irbesartan (AVAPRO) 300 mg tablet TAKE ONE TABLET BY MOUTH EVERY DAY    Lancets (OneTouch Delica Plus OOSLMS02H) MISC TEST BLOOD GLUCOSE ONCE DAILY    metFORMIN (GLUCOPHAGE-XR) 500 mg 24 hr tablet TAKE TWO TABLETS BY MOUTH TWICE A DAY WITH MEALS    metoprolol tartrate (LOPRESSOR) 50 mg tablet TAKE ONE TABLET BY MOUTH TWICE A DAY    Multiple Vitamins-Minerals (CENTRUM SILVER 50+MEN PO) 1 tablet, Oral, Daily    Multiple Vitamins-Minerals (OCUVITE-LUTEIN PO) 1 tablet, Oral, 2 times daily, Pt is taking preservision    nystatin (MYCOSTATIN) powder Topical, 3 times daily    Omega-3 Fatty Acids (fish oil) 1,000 mg Hold for 5 days from discharge    omeprazole (PRILOSEC) 40 mg, Oral, Daily at bedtime    OneTouch Verio test strip Daily, Test    traMADol (ULTRAM) 50 mg, Oral, Every 6 hours PRN    Vitamin D 2,000 Units, Oral, Daily    vitamin E (tocopherol) 400 Units, Oral, Daily          Review of Systems   Constitutional: Positive for appetite change and unexpected weight change  HENT: Negative  Eyes: Negative  Respiratory: Negative  Cardiovascular: Negative  Gastrointestinal: Positive for abdominal pain, nausea and vomiting  Negative for diarrhea  Pain below ileal conduit  Endocrine: Negative  Genitourinary: Negative for difficulty urinating, dysuria, flank pain, frequency, hematuria and urgency  Musculoskeletal: Negative  Skin: Negative  Allergic/Immunologic: Negative  Neurological: Negative  Hematological: Negative  Psychiatric/Behavioral: Negative  Objective     Physical Exam  Constitutional:       General: He is not in acute distress  Appearance: Normal appearance  He is normal weight  He is not ill-appearing or toxic-appearing  HENT:      Head: Normocephalic and atraumatic  Right Ear: External ear normal       Left Ear: External ear normal       Nose: Nose normal    Eyes:      General: No scleral icterus  Conjunctiva/sclera: Conjunctivae normal    Neck:      Musculoskeletal: Normal range of motion  Cardiovascular:      Rate and Rhythm: Normal rate and regular rhythm  Pulses: Normal pulses  Heart sounds: No murmur  No friction rub  No gallop  Pulmonary:      Effort: Pulmonary effort is normal       Breath sounds: No wheezing, rhonchi or rales  Abdominal:      General: Bowel sounds are normal  There is no distension  Palpations: Abdomen is soft  Tenderness: There is abdominal tenderness  There is no right CVA tenderness or left CVA tenderness  Comments: Abdomen is soft except for in right lower quadrant, along with tenderness in the left lower quadrant below patient's anastomosis  This is also more taut then the rest of his abdomen  Questionable mass or normal changes with ileal conduit? Ileal conduit present in the right upper quadrant, draining clear yellow urine  Musculoskeletal: Normal range of motion  Neurological:      General: No focal deficit present  Mental Status: He is alert and oriented to person, place, and time  Psychiatric:         Mood and Affect: Mood normal          Behavior: Behavior normal          Thought Content: Thought content normal          Judgment: Judgment normal                 Imaging:  CT scan of abdomen and pelvis without contrast    IMPRESSION:     Postsurgical changes in the pelvis, status post ileal conduit        Masslike appearance near the small bowel anastomosis and incisional scarring in the right hemipelvis  There is an infiltrative appearance of the surrounding soft tissues  This could be related to scar tissue and fibrosis with adherent bowel loops and   associated collapse, although an associated mass (area measures approximately 4 5 x 5 8 x 5 0 cm in size) could be benign (such as a desmoid tumor) or malignant also considered  An abscess in this region is also considered    (Axial image 72, series 2)     Midline incisional scarring with body wall edema and some infiltration of the fat in the anterior pelvic wall which could be edema versus cellulitis depending on the clinical setting      No hydronephrosis      Coronary atherosclerosis, cholelithiasis without discrete evidence of acute cholecystitis, and other findings as above    Labs:  Lab Results   Component Value Date    SODIUM 135 (L) 02/18/2021    K 4 5 02/18/2021     (H) 02/18/2021    CO2 12 (L) 02/18/2021    BUN 69 (H) 02/18/2021    CREATININE 3 32 (H) 02/18/2021    GLUC 109 02/18/2021    CALCIUM 7 7 (L) 02/18/2021         Lab Results   Component Value Date    WBC 16 57 (H) 02/18/2021    HGB 9 0 (L) 02/18/2021    HCT 27 7 (L) 02/18/2021    MCV 96 02/18/2021     02/18/2021         VTE Pharmacologic Prophylaxis: Heparin  VTE Mechanical Prophylaxis: sequential compression device     Grant Gonzalez PA-C

## 2021-02-19 ENCOUNTER — APPOINTMENT (INPATIENT)
Dept: RADIOLOGY | Facility: HOSPITAL | Age: 81
DRG: 862 | End: 2021-02-19
Payer: MEDICARE

## 2021-02-19 ENCOUNTER — APPOINTMENT (INPATIENT)
Dept: RADIOLOGY | Facility: HOSPITAL | Age: 81
DRG: 862 | End: 2021-02-19
Attending: RADIOLOGY
Payer: MEDICARE

## 2021-02-19 ENCOUNTER — TELEPHONE (OUTPATIENT)
Dept: INTERVENTIONAL RADIOLOGY/VASCULAR | Facility: CLINIC | Age: 81
End: 2021-02-19

## 2021-02-19 PROBLEM — E43 SEVERE PROTEIN-CALORIE MALNUTRITION (HCC): Status: ACTIVE | Noted: 2021-02-19

## 2021-02-19 PROBLEM — A41.9 SEPSIS (HCC): Status: ACTIVE | Noted: 2021-02-19

## 2021-02-19 PROBLEM — E87.8 ELECTROLYTE DISTURBANCE: Status: ACTIVE | Noted: 2021-02-19

## 2021-02-19 LAB
ANION GAP SERPL CALCULATED.3IONS-SCNC: 12 MMOL/L (ref 4–13)
BASOPHILS # BLD AUTO: 0.05 THOUSANDS/ΜL (ref 0–0.1)
BASOPHILS NFR BLD AUTO: 0 % (ref 0–1)
BUN SERPL-MCNC: 57 MG/DL (ref 5–25)
CALCIUM SERPL-MCNC: 7.7 MG/DL (ref 8.3–10.1)
CHLORIDE SERPL-SCNC: 110 MMOL/L (ref 100–108)
CO2 SERPL-SCNC: 14 MMOL/L (ref 21–32)
CREAT FLD-MCNC: 2.85 MG/DL
CREAT SERPL-MCNC: 3.21 MG/DL (ref 0.6–1.3)
EOSINOPHIL # BLD AUTO: 0.34 THOUSAND/ΜL (ref 0–0.61)
EOSINOPHIL NFR BLD AUTO: 2 % (ref 0–6)
ERYTHROCYTE [DISTWIDTH] IN BLOOD BY AUTOMATED COUNT: 14.7 % (ref 11.6–15.1)
GFR SERPL CREATININE-BSD FRML MDRD: 17 ML/MIN/1.73SQ M
GLUCOSE SERPL-MCNC: 110 MG/DL (ref 65–140)
GLUCOSE SERPL-MCNC: 164 MG/DL (ref 65–140)
GLUCOSE SERPL-MCNC: 170 MG/DL (ref 65–140)
GLUCOSE SERPL-MCNC: 98 MG/DL (ref 65–140)
HCT VFR BLD AUTO: 28.2 % (ref 36.5–49.3)
HGB BLD-MCNC: 9.3 G/DL (ref 12–17)
IMM GRANULOCYTES # BLD AUTO: 0.2 THOUSAND/UL (ref 0–0.2)
IMM GRANULOCYTES NFR BLD AUTO: 1 % (ref 0–2)
INR PPP: 1.25 (ref 0.84–1.19)
LYMPHOCYTES # BLD AUTO: 1.09 THOUSANDS/ΜL (ref 0.6–4.47)
LYMPHOCYTES NFR BLD AUTO: 6 % (ref 14–44)
MAGNESIUM SERPL-MCNC: 0.9 MG/DL (ref 1.6–2.6)
MCH RBC QN AUTO: 31.1 PG (ref 26.8–34.3)
MCHC RBC AUTO-ENTMCNC: 33 G/DL (ref 31.4–37.4)
MCV RBC AUTO: 94 FL (ref 82–98)
MONOCYTES # BLD AUTO: 1.65 THOUSAND/ΜL (ref 0.17–1.22)
MONOCYTES NFR BLD AUTO: 9 % (ref 4–12)
NEUTROPHILS # BLD AUTO: 14.68 THOUSANDS/ΜL (ref 1.85–7.62)
NEUTS SEG NFR BLD AUTO: 82 % (ref 43–75)
NRBC BLD AUTO-RTO: 0 /100 WBCS
PHOSPHATE SERPL-MCNC: 1.7 MG/DL (ref 2.3–4.1)
PLATELET # BLD AUTO: 188 THOUSANDS/UL (ref 149–390)
PMV BLD AUTO: 11.4 FL (ref 8.9–12.7)
POTASSIUM SERPL-SCNC: 4.3 MMOL/L (ref 3.5–5.3)
PROCALCITONIN SERPL-MCNC: 0.76 NG/ML
PROTHROMBIN TIME: 15.7 SECONDS (ref 11.6–14.5)
RBC # BLD AUTO: 2.99 MILLION/UL (ref 3.88–5.62)
SODIUM SERPL-SCNC: 136 MMOL/L (ref 136–145)
WBC # BLD AUTO: 18.01 THOUSAND/UL (ref 4.31–10.16)

## 2021-02-19 PROCEDURE — 85610 PROTHROMBIN TIME: CPT | Performed by: NURSE PRACTITIONER

## 2021-02-19 PROCEDURE — 99232 SBSQ HOSP IP/OBS MODERATE 35: CPT | Performed by: UROLOGY

## 2021-02-19 PROCEDURE — 99232 SBSQ HOSP IP/OBS MODERATE 35: CPT | Performed by: INTERNAL MEDICINE

## 2021-02-19 PROCEDURE — 10030 IMG GID FLU COLL DRG SFT TIS: CPT

## 2021-02-19 PROCEDURE — 74425 UROGRAPHY ANTEGRADE RS&I: CPT

## 2021-02-19 PROCEDURE — 82570 ASSAY OF URINE CREATININE: CPT | Performed by: UROLOGY

## 2021-02-19 PROCEDURE — 99152 MOD SED SAME PHYS/QHP 5/>YRS: CPT | Performed by: RADIOLOGY

## 2021-02-19 PROCEDURE — 49406 IMAGE CATH FLUID PERI/RETRO: CPT | Performed by: RADIOLOGY

## 2021-02-19 PROCEDURE — C1729 CATH, DRAINAGE: HCPCS

## 2021-02-19 PROCEDURE — 99153 MOD SED SAME PHYS/QHP EA: CPT

## 2021-02-19 PROCEDURE — 84145 PROCALCITONIN (PCT): CPT | Performed by: INTERNAL MEDICINE

## 2021-02-19 PROCEDURE — 87186 SC STD MICRODIL/AGAR DIL: CPT | Performed by: UROLOGY

## 2021-02-19 PROCEDURE — 85025 COMPLETE CBC W/AUTO DIFF WBC: CPT | Performed by: INTERNAL MEDICINE

## 2021-02-19 PROCEDURE — 87077 CULTURE AEROBIC IDENTIFY: CPT | Performed by: UROLOGY

## 2021-02-19 PROCEDURE — 97167 OT EVAL HIGH COMPLEX 60 MIN: CPT

## 2021-02-19 PROCEDURE — 99152 MOD SED SAME PHYS/QHP 5/>YRS: CPT

## 2021-02-19 PROCEDURE — 0W9F30Z DRAINAGE OF ABDOMINAL WALL WITH DRAINAGE DEVICE, PERCUTANEOUS APPROACH: ICD-10-PCS | Performed by: RADIOLOGY

## 2021-02-19 PROCEDURE — 82948 REAGENT STRIP/BLOOD GLUCOSE: CPT

## 2021-02-19 PROCEDURE — NC001 PR NO CHARGE: Performed by: NURSE PRACTITIONER

## 2021-02-19 PROCEDURE — 77012 CT SCAN FOR NEEDLE BIOPSY: CPT

## 2021-02-19 PROCEDURE — 87076 CULTURE ANAEROBE IDENT EACH: CPT | Performed by: UROLOGY

## 2021-02-19 PROCEDURE — 10030 IMG GID FLU COLL DRG SFT TIS: CPT | Performed by: RADIOLOGY

## 2021-02-19 PROCEDURE — 80048 BASIC METABOLIC PNL TOTAL CA: CPT | Performed by: INTERNAL MEDICINE

## 2021-02-19 PROCEDURE — 84100 ASSAY OF PHOSPHORUS: CPT | Performed by: INTERNAL MEDICINE

## 2021-02-19 PROCEDURE — 87205 SMEAR GRAM STAIN: CPT | Performed by: UROLOGY

## 2021-02-19 PROCEDURE — 83735 ASSAY OF MAGNESIUM: CPT | Performed by: INTERNAL MEDICINE

## 2021-02-19 PROCEDURE — 87070 CULTURE OTHR SPECIMN AEROBIC: CPT | Performed by: UROLOGY

## 2021-02-19 PROCEDURE — 87075 CULTR BACTERIA EXCEPT BLOOD: CPT | Performed by: UROLOGY

## 2021-02-19 RX ORDER — MIDAZOLAM HYDROCHLORIDE 2 MG/2ML
INJECTION, SOLUTION INTRAMUSCULAR; INTRAVENOUS CODE/TRAUMA/SEDATION MEDICATION
Status: COMPLETED | OUTPATIENT
Start: 2021-02-19 | End: 2021-02-19

## 2021-02-19 RX ORDER — HYDROMORPHONE HYDROCHLORIDE 4 MG/ML
INJECTION, SOLUTION INTRAMUSCULAR; INTRAVENOUS; SUBCUTANEOUS CODE/TRAUMA/SEDATION MEDICATION
Status: COMPLETED | OUTPATIENT
Start: 2021-02-19 | End: 2021-02-19

## 2021-02-19 RX ORDER — MAGNESIUM SULFATE HEPTAHYDRATE 40 MG/ML
4 INJECTION, SOLUTION INTRAVENOUS ONCE
Status: COMPLETED | OUTPATIENT
Start: 2021-02-19 | End: 2021-02-19

## 2021-02-19 RX ADMIN — HYDROMORPHONE HYDROCHLORIDE 0.5 MG: 4 INJECTION, SOLUTION INTRAMUSCULAR; INTRAVENOUS; SUBCUTANEOUS at 15:31

## 2021-02-19 RX ADMIN — METRONIDAZOLE 500 MG: 500 INJECTION, SOLUTION INTRAVENOUS at 01:37

## 2021-02-19 RX ADMIN — Medication 2000 UNITS: at 08:54

## 2021-02-19 RX ADMIN — TRAMADOL HYDROCHLORIDE 50 MG: 50 TABLET, FILM COATED ORAL at 08:54

## 2021-02-19 RX ADMIN — PANTOPRAZOLE SODIUM 40 MG: 40 TABLET, DELAYED RELEASE ORAL at 05:23

## 2021-02-19 RX ADMIN — POTASSIUM PHOSPHATE, MONOBASIC AND POTASSIUM PHOSPHATE, DIBASIC 12 MMOL: 224; 236 INJECTION, SOLUTION, CONCENTRATE INTRAVENOUS at 12:20

## 2021-02-19 RX ADMIN — MIDAZOLAM 1 MG: 1 INJECTION INTRAMUSCULAR; INTRAVENOUS at 15:54

## 2021-02-19 RX ADMIN — ASPIRIN 81 MG: 81 TABLET, CHEWABLE ORAL at 08:54

## 2021-02-19 RX ADMIN — NYSTATIN: 100000 POWDER TOPICAL at 17:46

## 2021-02-19 RX ADMIN — HYDROMORPHONE HYDROCHLORIDE 0.5 MG: 4 INJECTION, SOLUTION INTRAMUSCULAR; INTRAVENOUS; SUBCUTANEOUS at 15:54

## 2021-02-19 RX ADMIN — METRONIDAZOLE 500 MG: 500 INJECTION, SOLUTION INTRAVENOUS at 08:55

## 2021-02-19 RX ADMIN — CITALOPRAM HYDROBROMIDE 20 MG: 20 TABLET ORAL at 08:54

## 2021-02-19 RX ADMIN — SODIUM CHLORIDE, SODIUM GLUCONATE, SODIUM ACETATE, POTASSIUM CHLORIDE, MAGNESIUM CHLORIDE, SODIUM PHOSPHATE, DIBASIC, AND POTASSIUM PHOSPHATE 100 ML/HR: .53; .5; .37; .037; .03; .012; .00082 INJECTION, SOLUTION INTRAVENOUS at 23:04

## 2021-02-19 RX ADMIN — SODIUM CHLORIDE, SODIUM GLUCONATE, SODIUM ACETATE, POTASSIUM CHLORIDE, MAGNESIUM CHLORIDE, SODIUM PHOSPHATE, DIBASIC, AND POTASSIUM PHOSPHATE 100 ML/HR: .53; .5; .37; .037; .03; .012; .00082 INJECTION, SOLUTION INTRAVENOUS at 05:30

## 2021-02-19 RX ADMIN — IOHEXOL 60 ML: 350 INJECTION, SOLUTION INTRAVENOUS at 13:05

## 2021-02-19 RX ADMIN — MIDAZOLAM 1 MG: 1 INJECTION INTRAMUSCULAR; INTRAVENOUS at 15:53

## 2021-02-19 RX ADMIN — CEFEPIME HYDROCHLORIDE 1000 MG: 1 INJECTION, POWDER, FOR SOLUTION INTRAMUSCULAR; INTRAVENOUS at 22:45

## 2021-02-19 RX ADMIN — HEPARIN SODIUM 5000 UNITS: 5000 INJECTION INTRAVENOUS; SUBCUTANEOUS at 22:46

## 2021-02-19 RX ADMIN — HEPARIN SODIUM 5000 UNITS: 5000 INJECTION INTRAVENOUS; SUBCUTANEOUS at 05:23

## 2021-02-19 RX ADMIN — Medication 400 UNITS: at 08:54

## 2021-02-19 RX ADMIN — NYSTATIN: 100000 POWDER TOPICAL at 09:01

## 2021-02-19 RX ADMIN — MAGNESIUM SULFATE HEPTAHYDRATE 4 G: 40 INJECTION, SOLUTION INTRAVENOUS at 12:23

## 2021-02-19 RX ADMIN — METOPROLOL TARTRATE 50 MG: 25 TABLET, FILM COATED ORAL at 08:54

## 2021-02-19 RX ADMIN — NYSTATIN: 100000 POWDER TOPICAL at 22:45

## 2021-02-19 RX ADMIN — ATORVASTATIN CALCIUM 40 MG: 20 TABLET, FILM COATED ORAL at 22:46

## 2021-02-19 RX ADMIN — MIDAZOLAM 1 MG: 1 INJECTION INTRAMUSCULAR; INTRAVENOUS at 15:18

## 2021-02-19 RX ADMIN — MIDAZOLAM 1 MG: 1 INJECTION INTRAMUSCULAR; INTRAVENOUS at 15:31

## 2021-02-19 RX ADMIN — METRONIDAZOLE 500 MG: 500 INJECTION, SOLUTION INTRAVENOUS at 17:46

## 2021-02-19 RX ADMIN — Medication 1 TABLET: at 08:54

## 2021-02-19 NOTE — PROGRESS NOTES
Progress Note - Nuris Manning 1940, [de-identified] y o  male MRN: [id number]    Unit/Bed#: [facility] 723-01 Encounter: [id number]    Primary Care Provider: Meghan Hartley DO   Date and time admitted to hospital: 2/17/2021  8:52 PM        * Sepsis [facility])  Assessment & Plan  POA with leukocytosis and tachycardia  Suspected source intraabdominal abscess ? ? Known h/o radical cysto prostatectomy with ileal conduit at [facility]  Blood cultures negative so far  UA relatively unremarkable for significant infection  Procalcitonin elevated at 0 9  CT abdomen pelvis concerning for anterior abdominal wall cellulitis and/ or abscess in surgical area  PLAN:  · Continue IV cefepime Flagyl, day 2  Low threshold to add vancomycin if patient is persistently febrile on pending culture results  · Follow blood culture results  · Trend procalcitonin  · Urology on board, plan for IR is assist drainage today, follow culture results from the fluid from IR  · Trend CBC  Central abdominal mass  Assessment & Plan  Status post ileal conduit for bladder cancer; currently comes in with abdominal mass noticed for probably within the past 2 days  Concern for abscess  Less likely malignant process per Urology  Plan for loopogram and IR fluid drainage  Urology following    Acute kidney injury [facility])  Assessment & Plan  Baseline creatinine between 1-2  POA with creatinine of 3 59  I suspect this is likely related to volume depletion and sepsis could be contributing along with ARB use  Slowly improving, creatinine down to 3 2 today  No hydronephrosis on CT scan  UA shows 0-3 fine and coarse granular cast with some proteinuria and mild hematuria  Continue IV hydration  Hold ARB  Trend BMP for now  If no further improvement, will consider Nephrology evaluation  Electrolyte disturbance  Assessment & Plan  Severe hypomagnesemia and hypophosphatemia likely secondary to poor p o   Intake and malnutrition  Trend and replete as needed    Severe protein-calorie malnutrition ([facility] [address] )  Assessment & Plan  Malnutrition Findings:   Adult Malnutrition type: Chronic illness(related to disease/condition <75% energy intake versus neeeds for > 1 month as evidenced by 42#(21%) wt loss since 5/11/20 Treating with CCD3 diet & Glucerna BID as po supplement )  Adult Degree of Malnutrition: Other severe protein calorie malnutrition    BMI Findings: Body mass index is 24 07 kg/m²  Nutrition consult and supplements  Anxiety and depression  Assessment & Plan  Continue Celexa 20 mg daily  Elevated troponin  Assessment & Plan  Likely non MI elevation due to above  No chest discomfort and troponin trending down  Coronary artery disease involving native coronary artery of native heart without angina pectoris  Assessment & Plan  Currently without chest discomfort but noted elevated troponin  Will do ACS rule out  Previously has elevated troponin of 0 08  Elevated troponin may also be secondary to hemoconcentration due to KO  Lipid profile, TSH and hemoglobin A1c   YARIEL score 4    History of bladder cancer  Assessment & Plan  radical cysto prostatectomy with ileal conduit at [facility]  Urology on board  Rest As above    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)  Assessment & Plan  Lab Results   Component Value Date    HGBA1C 6 0 (H) 02/18/2021     Hemoglobin A1c; glucose checks per protocol with insulin sliding scale  Recent Labs     02/18/21  1651 02/18/21  2121 02/19/21  0723 02/19/21  1043   POCGLU 84 156* 98 170*       Blood Sugar Average: Last 72 hrs:  (P) 133     iss and accucheks  Hypoglycemia protocol      Hyperlipidemia  Assessment & Plan  Atorvastatin 40 mg to continue    Benign essential hypertension  Assessment & Plan  Continue metoprolol 50 mg b i d  With holding parameters  ARB on hold due to acute kidney injury          VTE Pharmacologic Prophylaxis:   Pharmacologic: Heparin  Mechanical VTE Prophylaxis in Place: Yes    Patient Centered Rounds: I have performed bedside rounds with nursing staff today  Discussions with Specialists or Other Care Team Provider: DOMINIQUE    Education and Discussions with Family / Patient: plan if care, patient  Also called and updated patient's wife    Time Spent for Care: 30 minutes  More than 50% of total time spent on counseling and coordination of care as described above  Current Length of Stay: 2 day(s)    Current Patient Status: Inpatient   Certification Statement: The patient will continue to require additional inpatient hospital stay due to Not medically stable    Discharge Plan:  When medically stable    Code Status: Level 1 - Full Code      Subjective:   No overnight events reported  Patient still with abdominal discomfort  No nausea, vomiting, diarrhea, constipation  Feels tired and fatigued  Objective:     Vitals:   Temp (24hrs), Av °F (36 7 °C), Min:97 5 °F (36 4 °C), Max:98 3 °F (36 8 °C)    Temp:  [97 5 °F (36 4 °C)-98 3 °F (36 8 °C)] 98 1 °F (36 7 °C)  HR:  [68-79] 74  BP: (125-140)/(60-65) 125/60  SpO2:  [95 %-100 %] 97 %  Body mass index is 24 07 kg/m²  Input and Output Summary (last 24 hours): Intake/Output Summary (Last 24 hours) at 2021 1354  Last data filed at 2021 1219  Gross per 24 hour   Intake 1480 ml   Output 650 ml   Net 830 ml       Physical Exam:     Physical Exam  Constitutional: Pt appears comfortable  Not in any acute distress  Eyes: PEERLA  Cardiovascular: Normal rate, regular rhythm, normal heart sounds  No murmur heard  Pulmonary/Chest: Effort normal, air entry b/l equal  No respiratory distress  Pt has no wheezes or crackles  Abdominal: Soft  Non-distended, abdominal tenderness noted  Bowel sounds are normal    Neurological: awake, alert    Communicative  Skin: Warm    Additional Data:     Labs:    Results from last 7 days   Lab Units 21  0857   WBC Thousand/uL 18 01* HEMOGLOBIN g/dL 9 3*   HEMATOCRIT % 28 2*   PLATELETS Thousands/uL 188   NEUTROS PCT % 82*   LYMPHS PCT % 6*   MONOS PCT % 9   EOS PCT % 2     Results from last 7 days   Lab Units 02/19/21  0857 02/18/21  1126   SODIUM mmol/L 136 137   POTASSIUM mmol/L 4 3 4 4   CHLORIDE mmol/L 110* 111*   CO2 mmol/L 14* 13*   BUN mg/dL 57* 66*   CREATININE mg/dL 3 21* 3 33*   ANION GAP mmol/L 12 13   CALCIUM mg/dL 7 7* 7 9*   ALBUMIN g/dL  --  2 0*   TOTAL BILIRUBIN mg/dL  --  0 21   ALK PHOS U/L  --  72   ALT U/L  --  19   AST U/L  --  18   GLUCOSE RANDOM mg/dL 164* 142*     Results from last 7 days   Lab Units 02/19/21  1209   INR  1 25*     Results from last 7 days   Lab Units 02/19/21  1043 02/19/21  0723 02/18/21  2121 02/18/21  1651 02/18/21  1056 02/18/21  0648 02/18/21  0359   POC GLUCOSE mg/dl 170* 98 156* 84 187* 114 122     Results from last 7 days   Lab Units 02/18/21  0032   HEMOGLOBIN A1C % 6 0*     Results from last 7 days   Lab Units 02/18/21  1126 02/18/21  0034 02/18/21  0032 02/17/21  2122   LACTIC ACID mmol/L  --   --  0 9 1 9   PROCALCITONIN ng/ml 0 90* 0 90*  --   --            * I Have Reviewed All Lab Data Listed Above  * Additional Pertinent Lab Tests Reviewed: All Labs Within Last 24 Hours Reviewed    Imaging:    XR chest 1 view portable   Final Result by Lang Leger MD (02/18 5547)      No acute cardiopulmonary disease  Workstation performed: [id number]         CT abdomen pelvis wo contrast   Final Result by Marcelo Rodriguez DO (02/17 2375)      Postsurgical changes in the pelvis, status post ileal conduit  Masslike appearance near the small bowel anastomosis and incisional scarring in the right hemipelvis  There is an infiltrative appearance of the surrounding soft tissues    This could be related to scar tissue and fibrosis with adherent bowel loops and    associated collapse, although an associated mass (area measures approximately 4 5 x 5 8 x 5 0 cm in size) could be benign (such as a desmoid tumor) or malignant also considered  An abscess in this region is also considered  (Axial image 72, series 2)      Midline incisional scarring with body wall edema and some infiltration of the fat in the anterior pelvic wall which could be edema versus cellulitis depending on the clinical setting  No hydronephrosis  Coronary atherosclerosis, cholelithiasis without discrete evidence of acute cholecystitis, and other findings as above  Workstation performed: [id number]         IR drainage tube placement    (Results Pending)   XR loopogram    (Results Pending)     Recent Cultures (last 7 days):     Results from last 7 days   Lab Units 02/17/21 2150 02/17/21 2122   BLOOD CULTURE  No Growth at 24 hrs  No Growth at 24 hrs         Last 24 Hours Medication List:   Current Facility-Administered Medications   Medication Dose Route Frequency Provider Last Rate    acetaminophen  650 mg Oral Q6H PRN Bret García MD      aluminum-magnesium hydroxide-simethicone  30 mL Oral Q6H PRN Bret García MD      aspirin  81 mg Oral Daily Bret García MD      atorvastatin  40 mg Oral HS Bret García MD      cefepime  1,000 mg Intravenous Q24H Bret García MD 1,000 mg (02/18/21 2249)    cholecalciferol  2,000 Units Oral Daily Bret García MD      citalopram  20 mg Oral Daily Bret García MD      docusate sodium  100 mg Oral BID PRN Bret García MD      fish oil  1,000 mg Oral Daily Bret García MD      fluticasone  2 spray Nasal Daily Bret García MD      heparin (porcine)  5,000 Units Subcutaneous [facility] [address] Bret García MD      insulin lispro  1-5 Units Subcutaneous [facility]ID [facility] Bret García MD      insulin lispro  1-5 Units Subcutaneous HS Bret García MD      magnesium sulfate  4 g Intravenous Once Tricia Bone MD      metoprolol tartrate  50 mg Oral BID Tricia Bone MD      metroNIDAZOLE  500 mg Intravenous Q8H Bret García  mg (02/19/21 0855)    multi-electrolyte  100 mL/hr Intravenous Continuous Manual MD Priyank Stopped (02/19/21 1229)    multivitamin-minerals  1 tablet Oral Daily Anne Marie Camacho MD      nystatin   Topical TID Anne Marie Camacho MD      ondansetron  4 mg Intravenous Q6H PRN Anne Marie Camacho MD      pantoprazole  40 mg Oral Early Morning Anne Marie Camacho MD      potassium phosphate  12 mmol Intravenous Once Matthieu Yost MD 12 mmol (02/19/21 1220)    traMADol  50 mg Oral Q6H PRN Anne Marie Camacho MD      vitamin E (tocopherol)  400 Units Oral Daily Anne Marie Camacho MD          Today, Patient Was Seen By: Matthieu Yost MD    ** Please Note: Dictation voice to text software may have been used in the creation of this document   **

## 2021-02-19 NOTE — INTERVAL H&P NOTE
Update: (This section must be completed if the H&P was completed greater than 24 hrs to procedure or admission)    H&P reviewed  After examining the patient, I find no changed to the H&P since it had been written  80M w/ cystectomy, conduit, now w/ abd wall pain found to have masslike collection suspicious for infection  Leukocytosis  Aspiration vs drainage is indicated    I discussed how we will evaluate the area with ultrasound and CT and aspirate or drain based on findings    Patient with opioid allergies will minimize opioid exposure but may use Dilaudid if required    loopogram reviewed, no extravasation    MP2 ASA3    Patient re-evaluated   Accept as history and physical     Christelle Haque MD/February 19, 2021/2:54 PM

## 2021-02-19 NOTE — BRIEF OP NOTE (RAD/CATH)
INTERVENTIONAL RADIOLOGY PROCEDURE NOTE    Date: 2/19/2021    Procedure: drain placement x2    Preoperative diagnosis:   1  Elevated troponin    2  Abnormal CT of the abdomen    3  Weakness    4  Leukocytosis    5  Malignant neoplasm of overlapping sites of bladder [facility])         Postoperative diagnosis: Same  Surgeon: Naun Grijalva MD     Assistant: None  No qualified resident was available  Blood loss:0    Specimens:     Midline thin fluid:  Culture aerobic and anaerobic, creatinine    Mass-like  When collection:  Culture aerobic and anaerobic      Findings: Thin linear collection containing liquid purulent material, 8 Persian Amplatz anchor drain placed    Thick masslike collection containing air, viscous pus encountered, 10 Persian Helane Radar drain placed    Complications: None immediate      Anesthesia: conscious sedation using Dilaudid

## 2021-02-19 NOTE — ASSESSMENT & PLAN NOTE
POA with leukocytosis and tachycardia  Suspected source intraabdominal abscess ? ? Known h/o radical cysto prostatectomy with ileal conduit at [facility]  Blood cultures negative so far  UA relatively unremarkable for significant infection  Procalcitonin elevated at 0 9  CT abdomen pelvis concerning for anterior abdominal wall cellulitis and/ or abscess in surgical area  PLAN:  · Continue IV cefepime Flagyl, day 2  Low threshold to add vancomycin if patient is persistently febrile on pending culture results  · Follow blood culture results  · Trend procalcitonin  · Urology on board, plan for IR is assist drainage today, follow culture results from the fluid from IR  · Trend CBC

## 2021-02-19 NOTE — ASSESSMENT & PLAN NOTE
Lab Results   Component Value Date    HGBA1C 6 0 (H) 02/18/2021     Hemoglobin A1c; glucose checks per protocol with insulin sliding scale    Recent Labs     02/18/21  1651 02/18/21  2121 02/19/21  0723 02/19/21  1043   POCGLU 84 156* 98 170*       Blood Sugar Average: Last 72 hrs:  (P) 133     iss and accucheks  Hypoglycemia protocol

## 2021-02-19 NOTE — SEDATION DOCUMENTATION
CT guided lower abdominal drainage tube placed x 2 in IR by Dr Lizbeth Velasquez without complication  Labs sent on fluid from each site  Bedrest start time 1630

## 2021-02-19 NOTE — OCCUPATIONAL THERAPY NOTE
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)    History of bladder cancer    Coronary artery disease involving native coronary artery of native heart without angina pectoris    Elevated troponin    Anxiety and depression    Central abdominal mass    Severe protein-calorie malnutrition (HCC)    Electrolyte disturbance    Past Medical History  Past Medical History:   Diagnosis Date    Acute kidney injury ([facility] )     Arthritis     Hands    Wright esophagus     BPH with obstruction/lower urinary tract symptoms     CAD (coronary artery disease)     Last assessed 09/16/15    Cancer ([facility] )     bladder    Cataract, acquired     Last assessed 10/10/17    Coronary artery disease     Diabetes mellitus ([facility] )     NIDDM    Diabetic neuropathy ([facility] )     Feet    Enlarged prostate with lower urinary tract symptoms (LUTS)     Last assessed 10/10/17    Erectile dysfunction     GERD (gastroesophageal reflux disease)     Last assessed 10/10/17    Hemoptysis     Last assessed 03/14/16    Hypercholesterolemia     Last assessed 10/10/17    Hypertension     Last assessed 10/10/17    Macular degeneration     Right eye is particularly affected    Myocardial infarction ([facility] ) 1998    OAB (overactive bladder)     Testicular hypofunction     Testicular hypogonadism     Last assessed 10/10/17    Tinnitus     Umbilical hernia     Last assessed 06/18/14    Urge incontinence of urine     Wears glasses      Past Surgical History  Past Surgical History:   Procedure Laterality Date    COLONOSCOPY      CORONARY ARTERY BYPASS GRAFT  07/16/2014    ABG x 4 LIMA to LAD,SVG to diagnoal 2 SVG to OM-1, SVG to PDA, resection of partial plerual mass    CYSTOSCOPY  2013    ESOPHAGOGASTRODUODENOSCOPY      FL RETROGRADE PYELOGRAM  12/20/2018    FL RETROGRADE PYELOGRAM  12/5/2019    INGUINAL HERNIA REPAIR Bilateral 2015    PHOTODYNAMIC THERAPY      For Barretts esophagus    ID COLONOSCOPY FLX DX W/COLLJ SPEC WHEN PFRMD N/A 4/25/2016    Procedure: COLONOSCOPY;  Surgeon: Sandrita Fuller MD;  Location:  GI LAB; Service: Gastroenterology    ID CYSTOURETHROSCOPY,BIOPSY N/A 9/10/2020    Procedure: CYSTOSCOPY, COLLECTION OF LEFT KIDNEY CYTOLOGY, BILATERAL RETROGRADE PYELOGRAM, BLADDER WALL BIOPSIES  AND [address], RANDOM BLADDER TUMOR BIOPSIES;  Surgeon: Shyam Lafleur MD;  Location: [facility] MAIN OR;  Service: Urology    ID CYSTOURETHROSCOPY,FULGUR 2-5 CM LESN N/A 4/16/2020    Procedure: CYSTOSCOPY, TRANSURETHRAL RESECTION OF BLADDER TUMOR (TURBT); Surgeon: Shyam Lafleur MD;  [facility];  Service: Urology    ID CYSTOURETHROSCOPY,FULGUR <0 5 CM LESN N/A 12/5/2019    Procedure: CYSTO W/TURBT AND TRANSURETHRAL PROSTATE BIOPSY AND OPENING OF BLADDER NECK CONTRACTURE, B/L Retrograde pyelogram;  Surgeon: Shyam Lafleur MD;  [facility];  Service: Urology    TONSILLECTOMY      TRANSURETHRAL RESECTION OF PROSTATE N/A 12/20/2018    Procedure: CYSTO, PHOTO SELECTIVE VAPORIZATION OF PROSTATE, B/L RETROGRADE PYELOGRAM, TURBT;  Surgeon: Shyam Lafleur MD;  Location[facility];  Service: Urology    UMBILICAL HERNIA REPAIR  2012    UPPER GASTROINTESTINAL ENDOSCOPY          02/19/21 1427   OT Last Visit   OT Visit Date 02/19/21   Note Type   Note type Evaluation   Restrictions/Precautions   Weight Bearing Precautions Per Order No   Other Precautions Cognitive;Multiple lines; Fall Risk;Pain   Pain Assessment   Pain Assessment Tool 0-10   Pain Score 5   Pain Location/Orientation Location: Abdomen   Hospital Pain Intervention(s) Emotional support   Home Living   Type of Home [facility] with 3-5STE)   Home Layout Two level;1/2 bath on main level   [address] [place] chair  (didn't use PTA)   Home Equipment Cane   Additional Comments Pt is a questionable historian  Per chart, pt resides with his wife in a [facility] with 1/2 bathroom on the first floor  Pt reports his wife recently had a "groin cancer surgery" and isn't sure of her lifting restrictions   Prior Function   Level of [place] Independent with ADLs and functional mobility   Lives With Spouse   ADL Assistance Independent   IADLs Independent   Falls in the last 6 months 1 to 4  (admits to multiple, but at least "1 bad one")   Vocational Retired   Lifestyle   Autonomy Pt was I with ADLs, required PRN assist with IADLs, and I with functional mobility with use of spc (but admits to falls)  Reciprocal Relationships supportive wife, but recovering from surgery   Service to Others retired   Intrinsic Gratification will continue to assess   Psychosocial   Psychosocial (WDL) WDL   Subjective   Subjective "I'm a lot weaker than usual"   ADL   Eating Assistance 7  Independent   Grooming Assistance 5  Supervision/Setup   UB Bathing Assistance 4  Minimal Assistance   LB [address] 3  Moderate Assistance   [address] 4  [address] 2  Maximal [address]  4  Minimal Assistance   Bed Mobility   Supine to Sit 3  Moderate assistance   Additional items Assist x 1; Increased time required;Verbal cues   Additional Comments Requires inc time for LE mgmt, assist with trunk mgmt  Requires cues for safe positioning  No lateral lean EOB   Transfers   Sit to Stand 4  Minimal assistance   Additional items Assist x 1; Increased time required;Verbal cues   Stand to Sit 4  Minimal assistance   Additional items Assist x 1; Increased time required;Verbal cues   Additional Comments Ax1 with use of rw   Functional Mobility   Functional Mobility 4  Minimal assistance   Additional Comments Ax1 with use of rw to ambulate < household distances with increased time and use of rw   Balance   Static Sitting Fair -   Dynamic Sitting Poor +   Static Standing Poor +   Dynamic Standing Poor +   Ambulatory Poor +   Activity Tolerance   Activity Tolerance Patient limited by fatigue   Medical Staff Made Aware Spoke with CM and PT   Nurse Made Aware Pt cleared by RN for OT evaluation and OOB mobility   RUE Assessment   RUE Assessment   (generalized weakness but equal b/l)   LUE Assessment   LUE Assessment   (generalized weakness but equal b/l)   Cognition   Overall Cognitive Status Impaired   Arousal/Participation Alert; Responsive; Cooperative   Attention Attends with cues to redirect   Orientation Level Oriented X4  (inc time for date)   Memory Decreased recall of recent events;Decreased recall of precautions;Decreased short term memory   Following Commands Follows one step commands with increased time or repetition   Comments Pleasant, cooperative, but requires increased time for processing   Assessment   Limitation Decreased ADL status; Decreased UE strength;Decreased Safe judgement during ADL;Decreased cognition;Decreased endurance;Decreased self-care trans;Decreased high-level ADLs   Prognosis Fair   Assessment Pt is an [de-identified]year old male seen for initial OT evaluation s/p [facility] with abdominal discomfort and mass-like structure beneath the ileal conduit  CT abdomen demonstrated mass that was suspicious for infection  Active problems include: sepsis, central abdominal mass, KO, electrolyte disturbance, severe protein-calorie malnutrition, anxiety and depression, elevated troponin, CAD, h/o bladder cancer s/p radial cysto prostatectomy with ileal conduit, DM with mild non-proliferative retinopathy b/l eyes without macular edema, HDL and benign essential HTN  Pt resides with his wife in a [facility] [address] with 3-5STE with 1/2 bathroom on the first floor    Pt reports his wife is recovering from "groin cancer surgery", and unclear how much physical assist she can provide  Pt was I with ADLs, required PRN assist with IADLs, and I with functional mobility with use of spc (but admits to falls)  Pt is currently demonstrating the following occupational deficits: grooming with set-up, UB bathing with Rani, LB bathing with modA, UB dressing with Rani, LB dressing with maxA, toileting with Rani, bed mobility with modA, functional transfers with Rani, and functional mobility with Rani  Factors currently limiting pt's independence in these areas include: generalized weakness, endurance, balance, functional mobility and limited support in home environment  From an OT standpoint, recommend STR upon d/c  Pt is to continue to benefit from skilled occupational therapy services while in the hospital to maximize functioning and independence with daily activities  See below for OT goals to be addressed 3-5x/wk  Goals   Patient Goals to be more independent   Plan   Treatment Interventions ADL retraining;Functional transfer training;UE strengthening/ROM; Endurance training;Patient/family training;Equipment evaluation/education; Compensatory technique education;Continued evaluation; Activityengagement   Goal Expiration Date 03/01/21   OT Treatment Day 1   OT Frequency 3-5x/wk   Recommendation   OT Discharge Recommendation Post-Acute Rehabilitation Services   OT - OK to Discharge   (to STR)   AM-PAC Daily Activity Inpatient   Lower Body Dressing 2   Bathing 2   Toileting 3   Upper Body Dressing 3   Grooming 4   Eating 4   Daily Activity Raw Score 18   Daily Activity Standardized Score (Calc for Raw Score >=11) 38 66   AM-PAC Applied Cognition Inpatient   Following a Speech/Presentation 3   Understanding Ordinary Conversation 3   Taking Medications 3   Remembering Where Things Are Placed or Put Away 3   Remembering List of 4-5 Errands 3   Taking Care of Complicated Tasks 3   Applied   Cognition Raw Score 18   Applied Cognition Standardized Score 38 07     Goals:    Pt will complete all self care tasks w/ Kristan w/ use of DME/AD as appropriate  Pt will improve functional transfers to Mod I on/off all surfaces using DME as needed w/ G balance/safety     Pt will improve functional mobility during ADL/IADL/leisure tasks to Mod I using DME as needed w/ G balance/safety     Pt will participate in simulated IADL management task to increase independence to supervision w/ G safety and endurance    Pt will demonstrate G carryover of pt/caregiver education and training as appropriate w/o cues w/ good tolerance to increase safety during functional tasks    Pt will demonstrate 100% carryover of energy conservation techniques t/o functional I/ADL/leisure tasks w/o cues s/p skilled education to increase endurance during functional tasks     Pt will maintain standing upright I'ly for at least 10 minutes while completing a dynamic functional activity with good balance and endurance      Ciarra [place], BOWEN, OTR/L

## 2021-02-19 NOTE — ASSESSMENT & PLAN NOTE
radical cysto prostatectomy with ileal conduit at [facility]  Urology on board  Rest As above

## 2021-02-19 NOTE — TELEMEDICINE
INTERPROFESSIONAL (PHONE) CONSULTATION - Interventional Radiology  Purnima Guillen [de-identified] y o  male MRN: [id number]  Unit/Bed#: [facility] 723-01 Encounter: [id number]    IR has been consulted to evaluate the patient, determine the appropriate procedure, and whether or not a procedure can and should be performed regarding the care of Purnima Guillen  We were consulted by urology concerning RLQ collection, and to possibly perform a aspiration/drainage if medically appropriate for the patient  IP Consult to IR  Consult performed by: LUKE Orona  Consult ordered by: Lesa Matthews PA-C        02/19/21      Assessment/Recommendation:     [de-identified]year old male with history of invasive urothelial carcinoma who underwent cystectomy and and diversion to ileal loop by Dr Sean Alexander at [facility] in October 2020  Patient now presents with swelling and lump in RLQ near ileal conduit  WBC 18, no fevers  CT scan showed a collection near the small bowel anastomosis  Of note, 2 weeks ago, ileal conduit "backed up," which raises some question if there may be a loop of bowel involved/causing some blockage - loopogram ordered  Pending outcome of loopogram consider aspiration/drainage catheter of collection  - plan for possible aspiration/drainage pending loopogram  - will send any fluids collected for culture  - keep NPO  - INR (pending)      Total time spent in review of data, discussion with requesting provider and rendering advice was 30 minutes  Patient or appropriate family member was verbally informed by urology of this consultative service on their behalf to provide more timely access to specialty care in lieu of an in person consultation  Verbal consent was obtained  Thank you for allowing Interventional Radiology to participate in the care of Purnima Giullen  Please don't hesitate to call or TigerText us with any questions       [address] [place] [address]

## 2021-02-19 NOTE — ASSESSMENT & PLAN NOTE
Malnutrition Findings:   Adult Malnutrition type: Chronic illness(related to disease/condition <75% energy intake versus neeeds for > 1 month as evidenced by 42#(21%) wt loss since 5/11/20 Treating with CCD3 diet & Glucerna BID as po supplement )  Adult Degree of Malnutrition: Other severe protein calorie malnutrition    BMI Findings: Body mass index is 24 07 kg/m²  Nutrition consult and supplements

## 2021-02-19 NOTE — ASSESSMENT & PLAN NOTE
Status post ileal conduit for bladder cancer; currently comes in with abdominal mass noticed for probably within the past 2 days  Concern for abscess  Less likely malignant process per Urology  Plan for loopogram and IR fluid drainage    Urology following

## 2021-02-19 NOTE — ASSESSMENT & PLAN NOTE
Severe hypomagnesemia and hypophosphatemia likely secondary to poor p o   Intake and malnutrition  Trend and replete as needed

## 2021-02-19 NOTE — ASSESSMENT & PLAN NOTE
Baseline creatinine between 1-2  POA with creatinine of 3 59  I suspect this is likely related to volume depletion and sepsis could be contributing along with ARB use  Slowly improving, creatinine down to 3 2 today  No hydronephrosis on CT scan  UA shows 0-3 fine and coarse granular cast with some proteinuria and mild hematuria  Continue IV hydration  Hold ARB  Trend BMP for now  If no further improvement, will consider Nephrology evaluation

## 2021-02-19 NOTE — PLAN OF CARE
Problem: OCCUPATIONAL THERAPY ADULT  Goal: Performs self-care activities at highest level of function for planned discharge setting  See evaluation for individualized goals  Description: Treatment Interventions: ADL retraining, Functional transfer training, UE strengthening/ROM, Endurance training, Patient/family training, Equipment evaluation/education, Compensatory technique education, Continued evaluation, Activityengagement          See flowsheet documentation for full assessment, interventions and recommendations  Note: Limitation: Decreased ADL status, Decreased UE strength, Decreased Safe judgement during ADL, Decreased cognition, Decreased endurance, Decreased self-care trans, Decreased high-level ADLs  Prognosis: Fair  Assessment: Pt is an [de-identified]year old male seen for initial OT evaluation s/p admission to [facility] 2/17/21 with abdominal discomfort and mass-like structure beneath the ileal conduit  CT abdomen demonstrated mass that was suspicious for infection  Active problems include: sepsis, central abdominal mass, KO, electrolyte disturbance, severe protein-calorie malnutrition, anxiety and depression, elevated troponin, CAD, h/o bladder cancer s/p radial cysto prostatectomy with ileal conduit, DM with mild non-proliferative retinopathy b/l eyes without macular edema, HDL and benign essential HTN  Pt resides with his wife in a [facility] [address] with 3-5STE with 1/2 bathroom on the first floor  Pt reports his wife is recovering from "groin cancer surgery", and unclear how much physical assist she can provide  Pt was I with ADLs, required PRN assist with IADLs, and I with functional mobility with use of spc (but admits to falls)  Pt is currently demonstrating the following occupational deficits: grooming with set-up, UB bathing with Rani, LB bathing with modA, UB dressing with Rani, LB dressing with maxA, toileting with Rani, bed mobility with modA, functional transfers with Rani, and functional mobility with Rani  Factors currently limiting pt's independence in these areas include: generalized weakness, endurance, balance, functional mobility and limited support in home environment  From an OT standpoint, recommend STR upon d/c  Pt is to continue to benefit from skilled occupational therapy services while in the hospital to maximize functioning and independence with daily activities  See below for OT goals to be addressed 3-5x/wk       OT Discharge Recommendation: Post-Acute Rehabilitation Services  OT - OK to Discharge: (to [address])

## 2021-02-19 NOTE — CASE MANAGEMENT
Therapy is recommending inpt rehab upon d/c  As per phone conversation with wife yesterday she would like a referral sent to [facility] for same  Referral sent via [facility]  Discussed same with wife

## 2021-02-19 NOTE — PROGRESS NOTES
UROLOGY PROGRESS NOTE   Patient Identifiers: Kiko Pleitez (MRN [id number])  Date of Service: 2/19/2021    Subjective:    Awake and alert  Ambulates in the room  Eating a regular diet  Afebrile  WBC 18 01  Patient reports his conduit bag backed up at night about 2 weeks ago and he developed swelling and a lump in his lower abdomen near the area of concern  Patient has  Feels somewhat better    Still has farzana-incisional discomfort      Objective:     VITALS:    Vitals:    02/19/21 0852   BP: 125/60   Pulse: 74   Resp:    Temp: 98 1 °F (36 7 °C)   SpO2: 97%           LABS:  Lab Results   Component Value Date    HGB 9 3 (L) 02/19/2021    HCT 28 2 (L) 02/19/2021    WBC 18 01 (H) 02/19/2021     02/19/2021   ]    Lab Results   Component Value Date     08/13/2015    K 4 4 02/18/2021     (H) 02/18/2021    CO2 13 (L) 02/18/2021    BUN 66 (H) 02/18/2021    CREATININE 3 33 (H) 02/18/2021    CALCIUM 7 9 (L) 02/18/2021    GLUCOSE 128 08/13/2015   ]        INPATIENT MEDS:    Current Facility-Administered Medications:     acetaminophen (TYLENOL) tablet 650 mg, 650 mg, Oral, Q6H PRN, Thierry Ludwig MD    aluminum-magnesium hydroxide-simethicone (MYLANTA) oral suspension 30 mL, 30 mL, Oral, Q6H PRN, Thierry Ludwig MD    aspirin chewable tablet 81 mg, 81 mg, Oral, Daily, Thierry Ludwig MD, 81 mg at 02/19/21 0854    atorvastatin (LIPITOR) tablet 40 mg, 40 mg, Oral, HS, Thierry Ludwig MD, 40 mg at 02/18/21 2248    cefepime (MAXIPIME) 1,000 mg in dextrose 5 % 50 mL IVPB, 1,000 mg, Intravenous, Q24H, Thierry Ludwig MD, Last Rate: 100 mL/hr at 02/18/21 2249, 1,000 mg at 02/18/21 2249    cholecalciferol (VITAMIN D3) tablet 2,000 Units, 2,000 Units, Oral, Daily, Thierry Ludwig MD, 2,000 Units at 02/19/21 0854    citalopram (CeleXA) tablet 20 mg, 20 mg, Oral, Daily, Thierry Ludwig MD, 20 mg at 02/19/21 0854    docusate sodium (COLACE) capsule 100 mg, 100 mg, Oral, BID PRN, Thierry Ludwig, MD    fish oil capsule 1,000 mg, 1,000 mg, Oral, Daily, Candice Krishnamurthy MD    fluticasone [facility]) 50 mcg/act nasal spray 2 spray, 2 spray, Nasal, Daily, Candice Krishnamurthy MD    heparin (porcine) subcutaneous injection 5,000 Units, 5,000 Units, Subcutaneous, Q8H [address], Candice Krishnamurthy MD, 5,000 Units at 02/19/21 0523    insulin lispro (HumaLOG) 100 units/mL subcutaneous injection 1-5 Units, 1-5 Units, Subcutaneous, TID AC **AND** Fingerstick Glucose (POCT), , , TID AC, Candice Krishnamurthy MD    insulin lispro (HumaLOG) 100 units/mL subcutaneous injection 1-5 Units, 1-5 Units, Subcutaneous, HS, Candice Krishnamurthy MD    metoprolol tartrate (LOPRESSOR) tablet 50 mg, 50 mg, Oral, BID, Annie Montenegro MD, 50 mg at 02/19/21 0854    metroNIDAZOLE (FLAGYL) IVPB (premix) 500 mg 100 mL, 500 mg, Intravenous, Q8H, Candice Krishnamurthy MD, Last Rate: 200 mL/hr at 02/19/21 0855, 500 mg at 02/19/21 0855    multi-electrolyte (PLASMALYTE-A/ISOLYTE-S PH 7 4) IV solution, 100 mL/hr, Intravenous, Continuous, Annie Montenegro MD, Last Rate: 100 mL/hr at 02/19/21 0530, 100 mL/hr at 02/19/21 0530    multivitamin-minerals (CENTRUM) tablet 1 tablet, 1 tablet, Oral, Daily, Candice Krishnamurthy MD, 1 tablet at 02/19/21 0854    nystatin (MYCOSTATIN) powder, , Topical, TID, Candice Krishnamurthy MD, Given at 02/19/21 0901    ondansetron (ZOFRAN) injection 4 mg, 4 mg, Intravenous, Q6H PRN, Candice Krishnamurthy MD    pantoprazole (PROTONIX) EC tablet 40 mg, 40 mg, Oral, Early Morning, Candice Krishnamurthy MD, 40 mg at 02/19/21 0523    traMADol (ULTRAM) tablet 50 mg, 50 mg, Oral, Q6H PRN, Candice Krishnamurthy MD, 50 mg at 02/19/21 0854    vitamin E (tocopherol) capsule 400 Units, 400 Units, Oral, Daily, Candice Krishnamurthy MD, 400 Units at 02/19/21 0854      Physical Exam:   /60   Pulse 74   Temp 98 1 °F (36 7 °C)   Resp 17   Ht 5' 8" (1 727 m) Comment: 1/18/21 encounter  Wt 71 8 kg (158 lb 4 6 oz)   SpO2 97%   BMI 24 07 kg/m²   GEN: no acute distress RESP: breathing comfortably with no accessory muscle use    ABD: Incision is healed well  It is tender and somewhat firm  The ileal conduit stoma is pink and viable  Abdomen is soft  INCISION:    EXT: no significant peripheral edema   (Male): Penis circumcised, phallus normal, meatus patent  Testicles descended into scrotum bilaterally without masses nor tenderness  No inguinal hernias bilaterally  ILEAL CONDUIT: in place draining clear yellow urine  no clots and       RADIOLOGY:   CT ABDOMEN AND PELVIS WITHOUT IV CONTRAST   IMPRESSION:     Postsurgical changes in the pelvis, status post ileal conduit  Masslike appearance near the small bowel anastomosis and incisional scarring in the right hemipelvis  There is an infiltrative appearance of the surrounding soft tissues  This could be related to scar tissue and fibrosis with adherent bowel loops and   associated collapse, although an associated mass (area measures approximately 4 5 x 5 8 x 5 0 cm in size) could be benign (such as a desmoid tumor) or malignant also considered  An abscess in this region is also considered  (Axial image 72, series 2)     Midline incisional scarring with body wall edema and some infiltration of the fat in the anterior pelvic wall which could be edema versus cellulitis depending on the clinical setting  No hydronephrosis  Coronary atherosclerosis, cholelithiasis without discrete evidence of acute cholecystitis, and other findings as above  Assessment:   1  TCC bladder status post radical cysto prostatectomy with ileal conduit at [facility]  2  Abdominal pain   3   Anastomotic mass in the right hemipelvis     Plan:   -seen with Dr Alesia Veliz  -his unclear whether this is postsurgical changes, abscess or recurrent cancer(less likely)  -he still has pain and worsening leukocytosis  -possibly consider interventional radiology abscess drainage  -I spoke with Interventional Radiology  -will order a loop-o-gram  -patient should remain NPO and they will attempt to drain this collection

## 2021-02-20 PROBLEM — K65.1 PELVIC ABSCESS IN MALE (HCC): Status: ACTIVE | Noted: 2021-02-18

## 2021-02-20 PROBLEM — E87.2 NORMAL ANION GAP METABOLIC ACIDOSIS: Status: ACTIVE | Noted: 2021-02-20

## 2021-02-20 PROBLEM — E87.20 NORMAL ANION GAP METABOLIC ACIDOSIS: Status: ACTIVE | Noted: 2021-02-20

## 2021-02-20 LAB
ANION GAP SERPL CALCULATED.3IONS-SCNC: 12 MMOL/L (ref 4–13)
BASOPHILS # BLD AUTO: 0.06 THOUSANDS/ΜL (ref 0–0.1)
BASOPHILS NFR BLD AUTO: 0 % (ref 0–1)
BUN SERPL-MCNC: 50 MG/DL (ref 5–25)
CALCIUM SERPL-MCNC: 7.7 MG/DL (ref 8.3–10.1)
CHLORIDE SERPL-SCNC: 113 MMOL/L (ref 100–108)
CO2 SERPL-SCNC: 13 MMOL/L (ref 21–32)
CREAT SERPL-MCNC: 2.85 MG/DL (ref 0.6–1.3)
EOSINOPHIL # BLD AUTO: 0.55 THOUSAND/ΜL (ref 0–0.61)
EOSINOPHIL NFR BLD AUTO: 4 % (ref 0–6)
ERYTHROCYTE [DISTWIDTH] IN BLOOD BY AUTOMATED COUNT: 14.8 % (ref 11.6–15.1)
GFR SERPL CREATININE-BSD FRML MDRD: 20 ML/MIN/1.73SQ M
GLUCOSE SERPL-MCNC: 168 MG/DL (ref 65–140)
GLUCOSE SERPL-MCNC: 173 MG/DL (ref 65–140)
GLUCOSE SERPL-MCNC: 188 MG/DL (ref 65–140)
GLUCOSE SERPL-MCNC: 93 MG/DL (ref 65–140)
GLUCOSE SERPL-MCNC: 99 MG/DL (ref 65–140)
HCT VFR BLD AUTO: 28.2 % (ref 36.5–49.3)
HGB BLD-MCNC: 9.2 G/DL (ref 12–17)
IMM GRANULOCYTES # BLD AUTO: 0.27 THOUSAND/UL (ref 0–0.2)
IMM GRANULOCYTES NFR BLD AUTO: 2 % (ref 0–2)
LYMPHOCYTES # BLD AUTO: 1.24 THOUSANDS/ΜL (ref 0.6–4.47)
LYMPHOCYTES NFR BLD AUTO: 8 % (ref 14–44)
MAGNESIUM SERPL-MCNC: 2.1 MG/DL (ref 1.6–2.6)
MCH RBC QN AUTO: 31.2 PG (ref 26.8–34.3)
MCHC RBC AUTO-ENTMCNC: 32.6 G/DL (ref 31.4–37.4)
MCV RBC AUTO: 96 FL (ref 82–98)
MONOCYTES # BLD AUTO: 1.54 THOUSAND/ΜL (ref 0.17–1.22)
MONOCYTES NFR BLD AUTO: 10 % (ref 4–12)
NEUTROPHILS # BLD AUTO: 11.6 THOUSANDS/ΜL (ref 1.85–7.62)
NEUTS SEG NFR BLD AUTO: 76 % (ref 43–75)
NRBC BLD AUTO-RTO: 0 /100 WBCS
PHOSPHATE SERPL-MCNC: 2.9 MG/DL (ref 2.3–4.1)
PLATELET # BLD AUTO: 197 THOUSANDS/UL (ref 149–390)
PMV BLD AUTO: 11.4 FL (ref 8.9–12.7)
POTASSIUM SERPL-SCNC: 4.3 MMOL/L (ref 3.5–5.3)
PROCALCITONIN SERPL-MCNC: 0.65 NG/ML
RBC # BLD AUTO: 2.95 MILLION/UL (ref 3.88–5.62)
SODIUM SERPL-SCNC: 138 MMOL/L (ref 136–145)
WBC # BLD AUTO: 15.26 THOUSAND/UL (ref 4.31–10.16)

## 2021-02-20 PROCEDURE — 84100 ASSAY OF PHOSPHORUS: CPT | Performed by: INTERNAL MEDICINE

## 2021-02-20 PROCEDURE — 83735 ASSAY OF MAGNESIUM: CPT | Performed by: INTERNAL MEDICINE

## 2021-02-20 PROCEDURE — 85025 COMPLETE CBC W/AUTO DIFF WBC: CPT | Performed by: INTERNAL MEDICINE

## 2021-02-20 PROCEDURE — 82948 REAGENT STRIP/BLOOD GLUCOSE: CPT

## 2021-02-20 PROCEDURE — 99232 SBSQ HOSP IP/OBS MODERATE 35: CPT | Performed by: UROLOGY

## 2021-02-20 PROCEDURE — 80048 BASIC METABOLIC PNL TOTAL CA: CPT | Performed by: INTERNAL MEDICINE

## 2021-02-20 PROCEDURE — 84145 PROCALCITONIN (PCT): CPT | Performed by: INTERNAL MEDICINE

## 2021-02-20 PROCEDURE — 99232 SBSQ HOSP IP/OBS MODERATE 35: CPT | Performed by: INTERNAL MEDICINE

## 2021-02-20 RX ADMIN — Medication 1 TABLET: at 08:27

## 2021-02-20 RX ADMIN — ATORVASTATIN CALCIUM 40 MG: 20 TABLET, FILM COATED ORAL at 22:35

## 2021-02-20 RX ADMIN — NYSTATIN: 100000 POWDER TOPICAL at 17:02

## 2021-02-20 RX ADMIN — CEFEPIME HYDROCHLORIDE 1000 MG: 1 INJECTION, POWDER, FOR SOLUTION INTRAMUSCULAR; INTRAVENOUS at 22:35

## 2021-02-20 RX ADMIN — HEPARIN SODIUM 5000 UNITS: 5000 INJECTION INTRAVENOUS; SUBCUTANEOUS at 05:03

## 2021-02-20 RX ADMIN — INSULIN LISPRO 1 UNITS: 100 INJECTION, SOLUTION INTRAVENOUS; SUBCUTANEOUS at 22:36

## 2021-02-20 RX ADMIN — METOPROLOL TARTRATE 50 MG: 25 TABLET, FILM COATED ORAL at 17:01

## 2021-02-20 RX ADMIN — INSULIN LISPRO 1 UNITS: 100 INJECTION, SOLUTION INTRAVENOUS; SUBCUTANEOUS at 16:55

## 2021-02-20 RX ADMIN — INSULIN LISPRO 1 UNITS: 100 INJECTION, SOLUTION INTRAVENOUS; SUBCUTANEOUS at 11:21

## 2021-02-20 RX ADMIN — Medication 400 UNITS: at 08:30

## 2021-02-20 RX ADMIN — CITALOPRAM HYDROBROMIDE 20 MG: 20 TABLET ORAL at 08:27

## 2021-02-20 RX ADMIN — HEPARIN SODIUM 5000 UNITS: 5000 INJECTION INTRAVENOUS; SUBCUTANEOUS at 16:55

## 2021-02-20 RX ADMIN — METOPROLOL TARTRATE 50 MG: 25 TABLET, FILM COATED ORAL at 08:30

## 2021-02-20 RX ADMIN — METRONIDAZOLE 500 MG: 500 INJECTION, SOLUTION INTRAVENOUS at 08:28

## 2021-02-20 RX ADMIN — PANTOPRAZOLE SODIUM 40 MG: 40 TABLET, DELAYED RELEASE ORAL at 05:03

## 2021-02-20 RX ADMIN — SODIUM CHLORIDE, SODIUM GLUCONATE, SODIUM ACETATE, POTASSIUM CHLORIDE, MAGNESIUM CHLORIDE, SODIUM PHOSPHATE, DIBASIC, AND POTASSIUM PHOSPHATE 100 ML/HR: .53; .5; .37; .037; .03; .012; .00082 INJECTION, SOLUTION INTRAVENOUS at 09:00

## 2021-02-20 RX ADMIN — SODIUM CHLORIDE, SODIUM GLUCONATE, SODIUM ACETATE, POTASSIUM CHLORIDE, MAGNESIUM CHLORIDE, SODIUM PHOSPHATE, DIBASIC, AND POTASSIUM PHOSPHATE 100 ML/HR: .53; .5; .37; .037; .03; .012; .00082 INJECTION, SOLUTION INTRAVENOUS at 19:26

## 2021-02-20 RX ADMIN — Medication 2000 UNITS: at 08:27

## 2021-02-20 RX ADMIN — METRONIDAZOLE 500 MG: 500 INJECTION, SOLUTION INTRAVENOUS at 01:40

## 2021-02-20 RX ADMIN — METRONIDAZOLE 500 MG: 500 INJECTION, SOLUTION INTRAVENOUS at 16:56

## 2021-02-20 RX ADMIN — HEPARIN SODIUM 5000 UNITS: 5000 INJECTION INTRAVENOUS; SUBCUTANEOUS at 22:35

## 2021-02-20 RX ADMIN — NYSTATIN: 100000 POWDER TOPICAL at 08:30

## 2021-02-20 RX ADMIN — ASPIRIN 81 MG: 81 TABLET, CHEWABLE ORAL at 08:27

## 2021-02-20 NOTE — ASSESSMENT & PLAN NOTE
radical cysto prostatectomy with ileal conduit at [facility]  Urology on board  Rest As above

## 2021-02-20 NOTE — PROGRESS NOTES
Progress Note - Babatunde Hidalgo 1940, [de-identified] y o  male MRN: [id number]    Unit/Bed#: [facility] 723-01 Encounter: [id number]    Primary Care Provider: Elvie Presley DO   Date and time admitted to hospital: 2/17/2021  8:52 PM        * Sepsis [facility])  Assessment & Plan  POA with leukocytosis and tachycardia  Likely sores right pelvic abscess   Known h/o radical cysto prostatectomy with ileal conduit at [facility]  Blood cultures negative so far  UA relatively unremarkable for significant infection  Procalcitonin elevated at 0 9>> 0 76  CT abdomen pelvis concerning for anterior abdominal wall cellulitis and/ or abscess in surgical area  Status post IR guided drainage and drain placement of a right pelvic abscess on 2/19  PLAN:  · Continue IV cefepime Flagyl, day 3  Low threshold to add vancomycin if patient is persistently febrile on pending culture results  · Follow blood culture results  · Follow culture data from the drainage fluid collection in IR  Will likely need Infectious Diseases evaluation to decide regarding duration of antibiotic course, although await culture results for now     · Trend procalcitonin  · Urology on board, appreciate input  · Trend CBC  Right sided Pelvic abscess in male [facility])  Assessment & Plan  Now status post IR guided drain placement on 02/19  Continue IV antibiotics and follow cultures  Rest as above  Urology following, appreciate input  Acute kidney injury [facility])  Assessment & Plan  Baseline creatinine between 1-2  POA with creatinine of 3 59  I suspect this is likely related to volume depletion and sepsis could be contributing along with ARB use  Slowly improving, down to 2 85 today  No hydronephrosis on CT scan  UA shows 0-3 fine and coarse granular cast with some proteinuria and mild hematuria  Continue IV hydration  Hold ARB  Trend BMP for now  If no further improvement, will consider Nephrology evaluation      low HCO3 with normal Anion gap  Assessment & Plan  Likely related to hyperchloremia  Noted underlying KO  Monitor for now    Electrolyte disturbance  Assessment & Plan  Severe hypomagnesemia and hypophosphatemia likely secondary to poor p o  Intake and malnutrition  Improved as of today  Trend and replete as needed    Severe protein-calorie malnutrition ([address] )  Assessment & Plan  Malnutrition Findings:   Adult Malnutrition type: Chronic illness(related to disease/condition <75% energy intake versus neeeds for > 1 month as evidenced by 42#(21%) wt loss since 5/11/20 Treating with CCD3 diet & Glucerna BID as po supplement )  Adult Degree of Malnutrition: Other severe protein calorie malnutrition    BMI Findings: Body mass index is 25 38 kg/m²  Nutrition consult and supplements  Anxiety and depression  Assessment & Plan  Continue Celexa 20 mg daily  Elevated troponin  Assessment & Plan  Likely non MI elevation due to above  No chest discomfort and troponin trending down  History of bladder cancer  Assessment & Plan  radical cysto prostatectomy with ileal conduit at [facility]  Urology on board  Rest As above    Type 2 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 [facility])  Assessment & Plan  Lab Results   Component Value Date    HGBA1C 6 0 (H) 02/18/2021     Hemoglobin A1c; glucose checks per protocol with insulin sliding scale  Recent Labs     02/19/21  1043 02/19/21  2141 02/20/21  0638 02/20/21  1047   POCGLU 170* 110 99 168*       Blood Sugar Average: Last 72 hrs:  (P) 130 8     iss and accucheks  Hypoglycemia protocol      Hyperlipidemia  Assessment & Plan  Atorvastatin 40 mg to continue    Benign essential hypertension  Assessment & Plan  Continue metoprolol 50 mg b i d  With holding parameters  ARB on hold due to acute kidney injury          VTE Pharmacologic Prophylaxis:   Pharmacologic: Heparin  Mechanical VTE Prophylaxis in Place: Yes    Patient Centered Rounds: I have performed bedside rounds with nursing staff today  Education and Discussions with Family / Patient: :  Discussed plan of care with the patient  Called and left a voicemail for wife  Time Spent for Care: 30 minutes  More than 50% of total time spent on counseling and coordination of care as described above  Current Length of Stay: 3 day(s)    Current Patient Status: Inpatient   Certification Statement: The patient will continue to require additional inpatient hospital stay due to Not medically stable    Discharge Plan:  when medically stable    Code Status: Level 1 - Full Code      Subjective:   No overnight events reported  Patient had a drain placed yesterday  Reports that his abdominal discomfort is much better after drain placement  Overall also feels a little better as compared to before  Objective:     Vitals:   Temp (24hrs), Av 7 °F (36 5 °C), Min:97 7 °F (36 5 °C), Max:97 7 °F (36 5 °C)    Temp:  [97 7 °F (36 5 °C)] 97 7 °F (36 5 °C)  HR:  [56-77] 69  Resp:  [16-18] 17  BP: ()/(50-69) 130/67  SpO2:  [91 %-100 %] 97 %  Body mass index is 25 38 kg/m²  Input and Output Summary (last 24 hours): Intake/Output Summary (Last 24 hours) at 2021 1358  Last data filed at 2021 1310  Gross per 24 hour   Intake 1598 ml   Output 1753 ml   Net -155 ml       Physical Exam:     Physical Exam  Constitutional: Pt appears comfortable  Not in any acute distress  Eyes: PEERLA  Cardiovascular: Normal rate, regular rhythm, normal heart sounds   No murmur heard  Pulmonary/Chest: Effort normal, air entry b/l equal  No respiratory distress  Pt has no wheezes or crackles  Abdominal: Soft  Non-distended, mild abdominal tenderness noted   Bowel sounds are normal   Drain noted with light colored output  Ileal loop draining clear urine    Neurological: awake, alert   Communicative  Skin: Warm    Additional Data:     Labs:    Results from last 7 days   Lab Units 21  0513   WBC Thousand/uL 15 26* HEMOGLOBIN g/dL 9 2*   HEMATOCRIT % 28 2*   PLATELETS Thousands/uL 197   NEUTROS PCT % 76*   LYMPHS PCT % 8*   MONOS PCT % 10   EOS PCT % 4     Results from last 7 days   Lab Units 02/20/21  0513  02/18/21  1126   SODIUM mmol/L 138   < > 137   POTASSIUM mmol/L 4 3   < > 4 4   CHLORIDE mmol/L 113*   < > 111*   CO2 mmol/L 13*   < > 13*   BUN mg/dL 50*   < > 66*   CREATININE mg/dL 2 85*   < > 3 33*   ANION GAP mmol/L 12   < > 13   CALCIUM mg/dL 7 7*   < > 7 9*   ALBUMIN g/dL  --   --  2 0*   TOTAL BILIRUBIN mg/dL  --   --  0 21   ALK PHOS U/L  --   --  72   ALT U/L  --   --  19   AST U/L  --   --  18   GLUCOSE RANDOM mg/dL 93   < > 142*    < > = values in this interval not displayed  Results from last 7 days   Lab Units 02/19/21  1209   INR  1 25*     Results from last 7 days   Lab Units 02/20/21  1047 02/20/21  0638 02/19/21  2141 02/19/21  1043 02/19/21  0723 02/18/21  2121 02/18/21  1651 02/18/21  1056 02/18/21  0648 02/18/21  0359   POC GLUCOSE mg/dl 168* 99 110 170* 98 156* 84 187* 114 122     Results from last 7 days   Lab Units 02/18/21  0032   HEMOGLOBIN A1C % 6 0*     Results from last 7 days   Lab Units 02/19/21  0857 02/18/21  1126 02/18/21  0034 02/18/21  0032 02/17/21  2122   LACTIC ACID mmol/L  --   --   --  0 9 1 9   PROCALCITONIN ng/ml 0 76* 0 90* 0 90*  --   --            * I Have Reviewed All Lab Data Listed Above  * Additional Pertinent Lab Tests Reviewed:  All Labs Within Last 24 Hours Reviewed    Imaging:    CT guided perc drainage catheter placeme   Final Result by Iraj Arriaga MD (02/19 1742)   Impression:       CT and ultrasound-guided drain placement x2:   Inferior 8-Tuvaluan Amplatz anchor drain placed into a small linear collection containing thin yellow purulent fluid      Superior 10-Tuvaluan Elder-Zhao drain placed into a air and debris filled cavity in the right abdominal wall with 60 mL of thick viscous pus      Plan:       Flush the 8-Tuvaluan inferior catheter with 3 mL daily Flush the 10-Swedish superior catheter with 5 mL twice daily   Monitor culture results         Workstation performed: [id number]         XR loopogram   Final Result by Jazlyn Gomez MD (02/19 1443)      Normal fluoroscopic loopogram demonstrating free reflux of contrast into the ureters bilaterally  No extravasated contrast   No communication of the ileal loop with the anterior pelvic collection seen on the prior CT  Workstation performed: [id number]         XR chest 1 view portable   Final Result by Sweta Rawls MD (02/18 3831)      No acute cardiopulmonary disease  Workstation performed: [id number]         CT abdomen pelvis wo contrast   Final Result by Mignon Eller DO (02/17 2323)      Postsurgical changes in the pelvis, status post ileal conduit  Masslike appearance near the small bowel anastomosis and incisional scarring in the right hemipelvis  There is an infiltrative appearance of the surrounding soft tissues  This could be related to scar tissue and fibrosis with adherent bowel loops and    associated collapse, although an associated mass (area measures approximately 4 5 x 5 8 x 5 0 cm in size) could be benign (such as a desmoid tumor) or malignant also considered  An abscess in this region is also considered  (Axial image 72, series 2)      Midline incisional scarring with body wall edema and some infiltration of the fat in the anterior pelvic wall which could be edema versus cellulitis depending on the clinical setting  No hydronephrosis  Coronary atherosclerosis, cholelithiasis without discrete evidence of acute cholecystitis, and other findings as above  Workstation performed: [id number]             Recent Cultures (last 7 days):     Results from last 7 days   Lab Units 02/19/21  1637 02/19/21  1548 02/17/21  2150 02/17/21 2122   BLOOD CULTURE   --   --  No Growth at 48 hrs  No Growth at 48 hrs     GRAM STAIN RESULT  4+ Gram variable rods*  4+ Disintegrating polys* 3+ Gram positive rods*  Rare Gram negative rods*  4+ Disintegrating polys*  --   --    BODY FLUID CULTURE, STERILE  4+ Growth of Gram Negative Goran Enteric Like* No growth  --   --        Last 24 Hours Medication List:   Current Facility-Administered Medications   Medication Dose Route Frequency Provider Last Rate    acetaminophen  650 mg Oral Q6H PRN Steph Julio MD      aluminum-magnesium hydroxide-simethicone  30 mL Oral Q6H PRN Steph Julio MD      aspirin  81 mg Oral Daily Steph Julio MD      atorvastatin  40 mg Oral HS Steph Julio MD      cefepime  1,000 mg Intravenous Q24H Steph Julio MD 1,000 mg (02/19/21 2245)    cholecalciferol  2,000 Units Oral Daily Steph Julio MD      citalopram  20 mg Oral Daily Steph Julio MD      docusate sodium  100 mg Oral BID PRN Steph Julio MD      fish oil  1,000 mg Oral Daily Steph Julio MD      fluticasone  2 spray Nasal Daily Steph Julio MD      heparin (porcine)  5,000 Units Subcutaneous [facility] Steph Julio MD      insulin lispro  1-5 Units Subcutaneous TID [facility] Steph Julio MD      insulin lispro  1-5 Units Subcutaneous HS Steph Julio MD      metoprolol tartrate  50 mg Oral BID Dewayne Blackman MD      metroNIDAZOLE  500 mg Intravenous Q8H Steph Julio  mg (02/20/21 0828)    multi-electrolyte  100 mL/hr Intravenous Continuous Dewayne Blackman  mL/hr (02/20/21 0900)    multivitamin-minerals  1 tablet Oral Daily Steph Julio MD      nystatin   Topical TID Steph Julio MD      ondansetron  4 mg Intravenous Q6H PRN Steph Julio MD      pantoprazole  40 mg Oral Early Morning Steph Julio MD      traMADol  50 mg Oral Q6H PRN Steph Julio MD      vitamin E (tocopherol)  400 Units Oral Daily Steph Julio MD          Today, Patient Was Seen By: Dewayne Blackman MD    ** Please Note: Dictation voice to text software may have been used in the creation of this document   **

## 2021-02-20 NOTE — ASSESSMENT & PLAN NOTE
POA with leukocytosis and tachycardia  Likely sores right pelvic abscess   Known h/o radical cysto prostatectomy with ileal conduit at [facility]  Blood cultures negative so far  UA relatively unremarkable for significant infection  Procalcitonin elevated at 0 9>> 0 76  CT abdomen pelvis concerning for anterior abdominal wall cellulitis and/ or abscess in surgical area  Status post IR guided drainage and drain placement of a right pelvic abscess on 2/19  PLAN:  · Continue IV cefepime Flagyl, day 3  Low threshold to add vancomycin if patient is persistently febrile on pending culture results  · Follow blood culture results  · Follow culture data from the drainage fluid collection in IR  Will likely need Infectious Diseases evaluation to decide regarding duration of antibiotic course, although await culture results for now     · Trend procalcitonin  · Urology on board, appreciate input  · Trend CBC

## 2021-02-20 NOTE — ASSESSMENT & PLAN NOTE
Malnutrition Findings:   Adult Malnutrition type: Chronic illness(related to disease/condition <75% energy intake versus neeeds for > 1 month as evidenced by 42#(21%) wt loss since 5/11/20 Treating with CCD3 diet & Glucerna BID as po supplement )  Adult Degree of Malnutrition: Other severe protein calorie malnutrition    BMI Findings: Body mass index is 25 38 kg/m²  Nutrition consult and supplements

## 2021-02-20 NOTE — PROGRESS NOTES
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 at [address]:  Continue antibiotics and present therapy  Await culture results  Drain management as per interventional Radiology    Subjective/Objective       Subjective:  He notes he is feeling better since drainage of the right lower quadrant abscess  Objective:     Blood pressure 130/67, pulse 69, temperature 97 7 °F (36 5 °C), resp  rate 17, height 5' 8" (1 727 m), weight 75 7 kg (166 lb 14 2 oz), SpO2 97 %  ,Body mass index is 25 38 kg/m²  Intake/Output Summary (Last 24 hours) at 2/20/2021 1114  Last data filed at 2/20/2021 3700  Gross per 24 hour   Intake 2416 33 ml   Output 1453 ml   Net 963 33 ml       Invasive Devices     Peripheral Intravenous Line            Peripheral IV 02/19/21 Right Forearm less than 1 day    Peripheral IV 02/20/21 Proximal;Right;Ventral (anterior) Forearm less than 1 day          Drain            Urostomy -- days    Closed/Suction Drain Inferior Abdomen Bulb 10 Fr  less than 1 day    Closed/Suction Drain Inferior Abdomen Bulb 8 Fr  less than 1 day                Review of Systems   Constitutional: Positive for activity change and appetite change  Negative for chills, diaphoresis, fatigue and fever  HENT: Negative  Eyes: Negative  Respiratory: Negative  Cardiovascular: Negative  Gastrointestinal: Positive for abdominal pain (improving)  Endocrine: Negative  Genitourinary:        See HPI   Musculoskeletal: Negative  Skin: Negative  Allergic/Immunologic: Negative  Neurological: Negative  Hematological: Negative  Psychiatric/Behavioral: Negative  All other systems reviewed and are negative  Physical Exam: /67   Pulse 69   Temp 97 7 °F (36 5 °C)   Resp 17   Ht 5' 8" (1 727 m) Comment: 1/18/21 encounter  Wt 75 7 kg (166 lb 14 2 oz)   SpO2 97%   BMI 25 38 kg/m²   General appearance: alert and oriented, in no acute distress  Head: Normocephalic, without obvious abnormality, atraumatic  Neck: supple, symmetrical, trachea midline  Back: symmetric, no curvature  ROM normal  No CVA tenderness  Lungs: Normal respiratory effort  Heart: regular rate and rhythm  Abdomen: Soft, decreased fullness and tenderness right lower quadrant, ileal loop draining clear urine  Male genitalia: normal  Extremities: extremities normal, warm and well-perfused; no cyanosis, clubbing, or edema  Neurologic: Grossly normal    Lab, Imaging and other studies:  I have personally reviewed pertinent lab results    , CBC:   Lab Results   Component Value Date    WBC 15 26 (H) 02/20/2021    HGB 9 2 (L) 02/20/2021    HCT 28 2 (L) 02/20/2021    MCV 96 02/20/2021     02/20/2021    MCH 31 2 02/20/2021    MCHC 32 6 02/20/2021    RDW 14 8 02/20/2021    MPV 11 4 02/20/2021    NRBC 0 02/20/2021   , CMP:   Lab Results   Component Value Date    SODIUM 138 02/20/2021    K 4 3 02/20/2021     (H) 02/20/2021    CO2 13 (L) 02/20/2021    BUN 50 (H) 02/20/2021    CREATININE 2 85 (H) 02/20/2021    CALCIUM 7 7 (L) 02/20/2021    EGFR 20 02/20/2021

## 2021-02-20 NOTE — ASSESSMENT & PLAN NOTE
Lab Results   Component Value Date    HGBA1C 6 0 (H) 02/18/2021     Hemoglobin A1c; glucose checks per protocol with insulin sliding scale    Recent Labs     02/19/21  1043 02/19/21  2141 02/20/21  0638 02/20/21  1047   POCGLU 170* 110 99 168*       Blood Sugar Average: Last 72 hrs:  (P) 130 8     iss and accucheks  Hypoglycemia protocol

## 2021-02-20 NOTE — ASSESSMENT & PLAN NOTE
Baseline creatinine between 1-2  POA with creatinine of 3 59  I suspect this is likely related to volume depletion and sepsis could be contributing along with ARB use  Slowly improving, down to 2 85 today  No hydronephrosis on CT scan  UA shows 0-3 fine and coarse granular cast with some proteinuria and mild hematuria  Continue IV hydration  Hold ARB  Trend BMP for now  If no further improvement, will consider Nephrology evaluation

## 2021-02-20 NOTE — ASSESSMENT & PLAN NOTE
Now status post IR guided drain placement on 02/19  Continue IV antibiotics and follow cultures  Rest as above  Urology following, appreciate input

## 2021-02-20 NOTE — ASSESSMENT & PLAN NOTE
Severe hypomagnesemia and hypophosphatemia likely secondary to poor p o  Intake and malnutrition  Improved as of today    Trend and replete as needed

## 2021-02-21 LAB
ANION GAP SERPL CALCULATED.3IONS-SCNC: 12 MMOL/L (ref 4–13)
BACTERIA SPEC ANAEROBE CULT: NORMAL
BASOPHILS # BLD MANUAL: 0 THOUSAND/UL (ref 0–0.1)
BASOPHILS NFR MAR MANUAL: 0 % (ref 0–1)
BUN SERPL-MCNC: 47 MG/DL (ref 5–25)
CALCIUM SERPL-MCNC: 7.5 MG/DL (ref 8.3–10.1)
CHLORIDE SERPL-SCNC: 112 MMOL/L (ref 100–108)
CO2 SERPL-SCNC: 14 MMOL/L (ref 21–32)
CREAT SERPL-MCNC: 2.7 MG/DL (ref 0.6–1.3)
EOSINOPHIL # BLD MANUAL: 0.54 THOUSAND/UL (ref 0–0.4)
EOSINOPHIL NFR BLD MANUAL: 4 % (ref 0–6)
ERYTHROCYTE [DISTWIDTH] IN BLOOD BY AUTOMATED COUNT: 14.5 % (ref 11.6–15.1)
GFR SERPL CREATININE-BSD FRML MDRD: 21 ML/MIN/1.73SQ M
GLUCOSE SERPL-MCNC: 109 MG/DL (ref 65–140)
GLUCOSE SERPL-MCNC: 170 MG/DL (ref 65–140)
GLUCOSE SERPL-MCNC: 177 MG/DL (ref 65–140)
GLUCOSE SERPL-MCNC: 180 MG/DL (ref 65–140)
GLUCOSE SERPL-MCNC: 93 MG/DL (ref 65–140)
HCT VFR BLD AUTO: 26.1 % (ref 36.5–49.3)
HGB BLD-MCNC: 8.7 G/DL (ref 12–17)
LYMPHOCYTES # BLD AUTO: 0.27 THOUSAND/UL (ref 0.6–4.47)
LYMPHOCYTES # BLD AUTO: 2 % (ref 14–44)
MAGNESIUM SERPL-MCNC: 2 MG/DL (ref 1.6–2.6)
MCH RBC QN AUTO: 31.1 PG (ref 26.8–34.3)
MCHC RBC AUTO-ENTMCNC: 33.3 G/DL (ref 31.4–37.4)
MCV RBC AUTO: 93 FL (ref 82–98)
MONOCYTES # BLD AUTO: 0.67 THOUSAND/UL (ref 0–1.22)
MONOCYTES NFR BLD: 5 % (ref 4–12)
NEUTROPHILS # BLD MANUAL: 11.31 THOUSAND/UL (ref 1.85–7.62)
NEUTS SEG NFR BLD AUTO: 84 % (ref 43–75)
NRBC BLD AUTO-RTO: 0 /100 WBCS
OVALOCYTES BLD QL SMEAR: PRESENT
PHOSPHATE SERPL-MCNC: 1.8 MG/DL (ref 2.3–4.1)
PLATELET # BLD AUTO: 212 THOUSANDS/UL (ref 149–390)
PLATELET BLD QL SMEAR: ADEQUATE
PMV BLD AUTO: 11.3 FL (ref 8.9–12.7)
POIKILOCYTOSIS BLD QL SMEAR: PRESENT
POLYCHROMASIA BLD QL SMEAR: PRESENT
POTASSIUM SERPL-SCNC: 4.2 MMOL/L (ref 3.5–5.3)
PROCALCITONIN SERPL-MCNC: 0.4 NG/ML
RBC # BLD AUTO: 2.8 MILLION/UL (ref 3.88–5.62)
RBC MORPH BLD: PRESENT
SODIUM SERPL-SCNC: 138 MMOL/L (ref 136–145)
VARIANT LYMPHS # BLD AUTO: 5 %
WBC # BLD AUTO: 13.46 THOUSAND/UL (ref 4.31–10.16)

## 2021-02-21 PROCEDURE — 99232 SBSQ HOSP IP/OBS MODERATE 35: CPT | Performed by: UROLOGY

## 2021-02-21 PROCEDURE — 97110 THERAPEUTIC EXERCISES: CPT

## 2021-02-21 PROCEDURE — 84145 PROCALCITONIN (PCT): CPT | Performed by: INTERNAL MEDICINE

## 2021-02-21 PROCEDURE — 99232 SBSQ HOSP IP/OBS MODERATE 35: CPT | Performed by: INTERNAL MEDICINE

## 2021-02-21 PROCEDURE — 97163 PT EVAL HIGH COMPLEX 45 MIN: CPT

## 2021-02-21 PROCEDURE — 84100 ASSAY OF PHOSPHORUS: CPT | Performed by: INTERNAL MEDICINE

## 2021-02-21 PROCEDURE — 85007 BL SMEAR W/DIFF WBC COUNT: CPT | Performed by: INTERNAL MEDICINE

## 2021-02-21 PROCEDURE — 85027 COMPLETE CBC AUTOMATED: CPT | Performed by: INTERNAL MEDICINE

## 2021-02-21 PROCEDURE — 82948 REAGENT STRIP/BLOOD GLUCOSE: CPT

## 2021-02-21 PROCEDURE — 83735 ASSAY OF MAGNESIUM: CPT | Performed by: INTERNAL MEDICINE

## 2021-02-21 PROCEDURE — 80048 BASIC METABOLIC PNL TOTAL CA: CPT | Performed by: INTERNAL MEDICINE

## 2021-02-21 RX ORDER — BISACODYL 10 MG
10 SUPPOSITORY, RECTAL RECTAL DAILY PRN
Status: DISCONTINUED | OUTPATIENT
Start: 2021-02-21 | End: 2021-02-26 | Stop reason: HOSPADM

## 2021-02-21 RX ORDER — DOCUSATE SODIUM 100 MG/1
100 CAPSULE, LIQUID FILLED ORAL 2 TIMES DAILY
Status: DISCONTINUED | OUTPATIENT
Start: 2021-02-21 | End: 2021-02-26 | Stop reason: HOSPADM

## 2021-02-21 RX ORDER — POLYETHYLENE GLYCOL 3350 17 G/17G
17 POWDER, FOR SOLUTION ORAL DAILY
Status: DISCONTINUED | OUTPATIENT
Start: 2021-02-21 | End: 2021-02-26 | Stop reason: HOSPADM

## 2021-02-21 RX ORDER — SENNOSIDES 8.6 MG
1 TABLET ORAL 2 TIMES DAILY
Status: DISCONTINUED | OUTPATIENT
Start: 2021-02-21 | End: 2021-02-26 | Stop reason: HOSPADM

## 2021-02-21 RX ADMIN — FLUTICASONE PROPIONATE 2 SPRAY: 50 SPRAY, METERED NASAL at 08:52

## 2021-02-21 RX ADMIN — CEFEPIME HYDROCHLORIDE 1000 MG: 1 INJECTION, POWDER, FOR SOLUTION INTRAMUSCULAR; INTRAVENOUS at 21:42

## 2021-02-21 RX ADMIN — ALUMINUM HYDROXIDE, MAGNESIUM HYDROXIDE, AND SIMETHICONE 30 ML: 200; 200; 20 SUSPENSION ORAL at 09:03

## 2021-02-21 RX ADMIN — METRONIDAZOLE 500 MG: 500 INJECTION, SOLUTION INTRAVENOUS at 18:04

## 2021-02-21 RX ADMIN — HEPARIN SODIUM 5000 UNITS: 5000 INJECTION INTRAVENOUS; SUBCUTANEOUS at 18:04

## 2021-02-21 RX ADMIN — HEPARIN SODIUM 5000 UNITS: 5000 INJECTION INTRAVENOUS; SUBCUTANEOUS at 05:10

## 2021-02-21 RX ADMIN — METOPROLOL TARTRATE 50 MG: 25 TABLET, FILM COATED ORAL at 18:08

## 2021-02-21 RX ADMIN — METOPROLOL TARTRATE 50 MG: 25 TABLET, FILM COATED ORAL at 08:51

## 2021-02-21 RX ADMIN — INSULIN LISPRO 1 UNITS: 100 INJECTION, SOLUTION INTRAVENOUS; SUBCUTANEOUS at 11:18

## 2021-02-21 RX ADMIN — Medication 400 UNITS: at 08:53

## 2021-02-21 RX ADMIN — ASPIRIN 81 MG: 81 TABLET, CHEWABLE ORAL at 08:51

## 2021-02-21 RX ADMIN — NYSTATIN: 100000 POWDER TOPICAL at 18:06

## 2021-02-21 RX ADMIN — ATORVASTATIN CALCIUM 40 MG: 20 TABLET, FILM COATED ORAL at 21:33

## 2021-02-21 RX ADMIN — INSULIN LISPRO 1 UNITS: 100 INJECTION, SOLUTION INTRAVENOUS; SUBCUTANEOUS at 21:34

## 2021-02-21 RX ADMIN — METRONIDAZOLE 500 MG: 500 INJECTION, SOLUTION INTRAVENOUS at 00:35

## 2021-02-21 RX ADMIN — NYSTATIN: 100000 POWDER TOPICAL at 08:53

## 2021-02-21 RX ADMIN — Medication 2000 UNITS: at 08:51

## 2021-02-21 RX ADMIN — DOCUSATE SODIUM 100 MG: 100 CAPSULE, LIQUID FILLED ORAL at 18:04

## 2021-02-21 RX ADMIN — HEPARIN SODIUM 5000 UNITS: 5000 INJECTION INTRAVENOUS; SUBCUTANEOUS at 21:33

## 2021-02-21 RX ADMIN — NYSTATIN: 100000 POWDER TOPICAL at 21:33

## 2021-02-21 RX ADMIN — PANTOPRAZOLE SODIUM 40 MG: 40 TABLET, DELAYED RELEASE ORAL at 05:10

## 2021-02-21 RX ADMIN — POTASSIUM PHOSPHATE, MONOBASIC AND POTASSIUM PHOSPHATE, DIBASIC 12 MMOL: 224; 236 INJECTION, SOLUTION, CONCENTRATE INTRAVENOUS at 11:15

## 2021-02-21 RX ADMIN — SODIUM CHLORIDE, SODIUM GLUCONATE, SODIUM ACETATE, POTASSIUM CHLORIDE, MAGNESIUM CHLORIDE, SODIUM PHOSPHATE, DIBASIC, AND POTASSIUM PHOSPHATE 100 ML/HR: .53; .5; .37; .037; .03; .012; .00082 INJECTION, SOLUTION INTRAVENOUS at 05:09

## 2021-02-21 RX ADMIN — SODIUM CHLORIDE, SODIUM GLUCONATE, SODIUM ACETATE, POTASSIUM CHLORIDE, MAGNESIUM CHLORIDE, SODIUM PHOSPHATE, DIBASIC, AND POTASSIUM PHOSPHATE 75 ML/HR: .53; .5; .37; .037; .03; .012; .00082 INJECTION, SOLUTION INTRAVENOUS at 18:04

## 2021-02-21 RX ADMIN — INSULIN LISPRO 1 UNITS: 100 INJECTION, SOLUTION INTRAVENOUS; SUBCUTANEOUS at 18:05

## 2021-02-21 RX ADMIN — CITALOPRAM HYDROBROMIDE 20 MG: 20 TABLET ORAL at 08:51

## 2021-02-21 RX ADMIN — DOCUSATE SODIUM 100 MG: 100 CAPSULE, LIQUID FILLED ORAL at 09:03

## 2021-02-21 RX ADMIN — METRONIDAZOLE 500 MG: 500 INJECTION, SOLUTION INTRAVENOUS at 08:52

## 2021-02-21 RX ADMIN — Medication 1 TABLET: at 08:51

## 2021-02-21 RX ADMIN — OMEGA-3 FATTY ACIDS CAP 1000 MG 1000 MG: 1000 CAP at 08:51

## 2021-02-21 NOTE — ASSESSMENT & PLAN NOTE
· POA with leukocytosis and tachycardia  · Likely source right pelvic abscess  ·  Known h/o radical cysto prostatectomy with ileal conduit at [facility]  · Blood cultures negative at 72 hours  · UA relatively unremarkable for significant infection  · Procalcitonin elevated at 0 9>> 0 76>> 0;4, Leukocytosis trending down as well  · CT abdomen pelvis concerning for anterior abdominal wall cellulitis and/ or abscess in surgical area  · Status post IR guided drainage and drain placement of a right pelvic abscess on 2/19  · Drainage Cultures from IR are growing E coli and Klebsiella so far pending susceptibilities  Gram-positive rods also noted  PLAN:  · Continue IV cefepime Flagyl, day 4   · Follow blood culture results  · Follow culture data from the drainage fluid collection in IR  Will likely need Infectious Diseases evaluation to decide regarding duration of antibiotic course, although await culture results for now     · Trend procalcitonin  · Urology on board, appreciate input  · Trend CBC

## 2021-02-21 NOTE — ASSESSMENT & PLAN NOTE
Baseline creatinine between 1-2  POA with creatinine of 3 59  I suspect this is likely related to volume depletion and sepsis could be contributing along with ARB use  Slowly improving, down to 2 7 today  No hydronephrosis on CT scan  UA shows 0-3 fine and coarse granular cast with some proteinuria and mild hematuria  Continue IV hydration  Hold ARB  Trend BMP for now  If no further improvement, will consider Nephrology evaluation

## 2021-02-21 NOTE — PLAN OF CARE
Problem: PHYSICAL THERAPY ADULT  Goal: Performs mobility at highest level of function for planned discharge setting  See evaluation for individualized goals  Description: Treatment/Interventions: Functional transfer training, LE strengthening/ROM, Elevations, Therapeutic exercise, Endurance training, Cognitive reorientation, Patient/family training, Equipment eval/education, Bed mobility, Gait training, Spoke to nursing, Family  Equipment Recommended: Dewayne Robertson       See flowsheet documentation for full assessment, interventions and recommendations  Note: Prognosis: Good  Problem List: Decreased strength, Decreased range of motion, Decreased endurance, Impaired balance, Decreased mobility, Decreased coordination, Decreased cognition, Impaired judgement, Decreased safety awareness, Decreased skin integrity, Pain(drains )  Assessment: Pt is [de-identified] y o  male seen for PT evaluation s/p admit to [facility] on 2/17/2021  Pt presenting w/ abdominal discomfort and mass-like structure beneath the ileal conduit  CT abdomen demonstrated mass that was suspicious for infection  Active problems include: sepsis, central abdominal mass, KO, electrolyte disturbance, severe protein-calorie malnutrition, anxiety and depression, elevated troponin, CAD, h/o bladder cancer s/p radial cysto prostatectomy with ileal conduit, DM with mild non-proliferative retinopathy b/l eyes without macular edema, HDL and benign essential HTN  Pt resides with his wife in a [facility] with 3-5STE with 1/2 bathroom on the first floor  Personal factors affecting pt at time of IE include: steps to enter environment, ? Cognition ; depression; advanced age, past experience, inability to perform IADLs, inability to perform ADLs, inability to ambulate household distances, limited insight into impairments    Due to acute medical issues, ongoing medical workup for primary dx; pain, fall risk, increased reliance on more restrictive AD compared to baseline; decreased activity tolerance compared to baseline, increased assistance needed from caregiver at current time, continuous telemetry monitoring, KIKI x2; multiple lines, decline in overall functional mobility status; health management issues; note unstable clinical picture (high complexity)   Currently pt  is requiring modA A for bed skills; Rani for functional transfers and Rani for ambulation w/ RW 40' x1 w/ 1 standing rest break  Pt presents functioning below baseline and currently w/ overall mobility deficits 2* to: decreased LE strength/AROM; limited flexibility;  generalized weakness/ deconditioning; decreased endurance; decreased activity tolerance; decreased coordination; impaired balance; gait deviations; decreased safety awareness; SOB/ELIAS; fatigue; impaired safety and judgement; limited insight into current deficits; bed/ chair alarms; multiple lines; hearing impairment/ visual impairments; cognition  Pt currently at risk for falls  (Please find additional objective findings from PT assessment regarding body systems outlined above ) Pt will continue to benefit from skilled PT interventions to address state  d impairments; to maximize functional potential; for ongoing pt/ family training; and DME needs  PT is currently recommending d/c to inpatient rehab setting when medically cleared for safety and to maximize functional potential prior to d/c home  Davide Hylton Pt/ family agr  Barriers to Discharge: Inaccessible home environment     PT Discharge Recommendation: (S) Post-Acute Rehabilitation Services     PT - OK to Discharge: Yes(to rehab when medically cleared )    See flowsheet documentation for full assessment

## 2021-02-21 NOTE — ASSESSMENT & PLAN NOTE
Severe hypomagnesemia and hypophosphatemia likely secondary to poor p o  Intake and malnutrition  Improving    Trend and replete as needed

## 2021-02-21 NOTE — ASSESSMENT & PLAN NOTE
Lab Results   Component Value Date    HGBA1C 6 0 (H) 02/18/2021     Hemoglobin A1c; glucose checks per protocol with insulin sliding scale    Recent Labs     02/20/21  1616 02/20/21  2115 02/21/21  0659 02/21/21  1057   POCGLU 188* 173* 93 170*       Blood Sugar Average: Last 72 hrs:  (P) 138     iss and accucheks  Hypoglycemia protocol

## 2021-02-21 NOTE — ASSESSMENT & PLAN NOTE
radical cysto prostatectomy with ileal conduit at [facility]  Urology on board  Rest As above

## 2021-02-21 NOTE — PROGRESS NOTES
Progress Note - Aaron Level 1940, [de-identified] y o  male MRN: [id number]    Unit/Bed#: [facility] 723-01 Encounter: [id number]    Primary Care Provider: Jose Stone DO   Date and time admitted to hospital: 2/17/2021  8:52 PM        * Sepsis [facility])  Assessment & Plan  · POA with leukocytosis and tachycardia  · Likely source right pelvic abscess  ·  Known h/o radical cysto prostatectomy with ileal conduit at [facility]  · Blood cultures negative at 72 hours  · UA relatively unremarkable for significant infection  · Procalcitonin elevated at 0 9>> 0 76>> 0;4, Leukocytosis trending down as well  · CT abdomen pelvis concerning for anterior abdominal wall cellulitis and/ or abscess in surgical area  · Status post IR guided drainage and drain placement of a right pelvic abscess on 2/19  · Drainage Cultures from IR are growing E coli and Klebsiella so far pending susceptibilities  Gram-positive rods also noted  PLAN:  · Continue IV cefepime Flagyl, day 4   · Follow blood culture results  · Follow culture data from the drainage fluid collection in IR  Will likely need Infectious Diseases evaluation to decide regarding duration of antibiotic course, although await culture results for now     · Trend procalcitonin  · Urology on board, appreciate input  · Trend CBC  Right sided Pelvic abscess in male [facility])  Assessment & Plan  Now status post IR guided drain placement on 02/19  Continue IV antibiotics and follow cultures  Rest as above  Urology following, appreciate input  Acute kidney injury [facility])  Assessment & Plan  Baseline creatinine between 1-2  POA with creatinine of 3 59  I suspect this is likely related to volume depletion and sepsis could be contributing along with ARB use  Slowly improving, down to 2 7 today  No hydronephrosis on CT scan  UA shows 0-3 fine and coarse granular cast with some proteinuria and mild hematuria  Continue IV hydration  Hold ARB  Trend BMP for now    If no further improvement, will consider Nephrology evaluation  low HCO3 with normal Anion gap  Assessment & Plan  Likely related to hyperchloremia  Noted underlying KO  Monitor for now    Electrolyte disturbance  Assessment & Plan  Severe hypomagnesemia and hypophosphatemia likely secondary to poor p o  Intake and malnutrition  Improving  Trend and replete as needed    Severe protein-calorie malnutrition ([address] )  Assessment & Plan  Malnutrition Findings:   Adult Malnutrition type: Chronic illness(related to disease/condition <75% energy intake versus neeeds for > 1 month as evidenced by 42#(21%) wt loss since 5/11/20 Treating with CCD3 diet & Glucerna BID as po supplement )  Adult Degree of Malnutrition: Other severe protein calorie malnutrition    BMI Findings: Body mass index is 25 31 kg/m²  Nutrition consult and supplements  Anxiety and depression  Assessment & Plan  Continue Celexa 20 mg daily  Elevated troponin  Assessment & Plan  Likely non MI elevation due to above  No chest discomfort and troponin trending down  History of bladder cancer  Assessment & Plan  radical cysto prostatectomy with ileal conduit at [facility]  Urology on board  Rest As above    Type 2 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 [facility])  Assessment & Plan  Lab Results   Component Value Date    HGBA1C 6 0 (H) 02/18/2021     Hemoglobin A1c; glucose checks per protocol with insulin sliding scale  Recent Labs     02/20/21  1616 02/20/21  2115 02/21/21  0659 02/21/21  1057   POCGLU 188* 173* 93 170*       Blood Sugar Average: Last 72 hrs:  (P) 138     iss and accucheks  Hypoglycemia protocol      Hyperlipidemia  Assessment & Plan  Atorvastatin 40 mg to continue    Benign essential hypertension  Assessment & Plan  Continue metoprolol 50 mg b i d  With holding parameters  ARB on hold due to acute kidney injury          VTE Pharmacologic Prophylaxis:   Pharmacologic: Heparin  Mechanical VTE Prophylaxis in Place: Yes    Patient Centered Rounds: I have performed bedside rounds with nursing staff today  Discussions with Specialists or Other Care Team Provider:      Education and Discussions with Family / Patient:  Discussed plan of care with patient, patient's wife and son  Time Spent for Care: 30 minutes  More than 50% of total time spent on counseling and coordination of care as described above  Current Length of Stay: 4 day(s)    Current Patient Status: Inpatient   Certification Statement: The patient will continue to require additional inpatient hospital stay due to Not medically stable    Discharge Plan:  Rehab when medically stable    Code Status: Level 1 - Full Code      Subjective:   No overnight events reported  Patient reports that he continues to feel better as compared to before  Appetite is increasing slowly as well  Reports constipation  Objective:     Vitals:   Temp (24hrs), Av 5 °F (36 9 °C), Min:98 1 °F (36 7 °C), Max:98 8 °F (37 1 °C)    Temp:  [98 1 °F (36 7 °C)-98 8 °F (37 1 °C)] 98 1 °F (36 7 °C)  HR:  [69-82] 71  Resp:  [17-18] 17  BP: (126-149)/(53-73) 143/71  SpO2:  [96 %-97 %] 97 %  Body mass index is 25 31 kg/m²  Input and Output Summary (last 24 hours): Intake/Output Summary (Last 24 hours) at 2021 1455  Last data filed at 2021 1201  Gross per 24 hour   Intake 3503 ml   Output 2386 ml   Net 1117 ml       Physical Exam:     Physical Exam  Constitutional: Pt appears comfortable  Not in any acute distress  Eyes: PEERLA  Cardiovascular: Normal rate, regular rhythm, normal heart sounds   No murmur heard  Pulmonary/Chest: Effort normal, air entry b/l equal  No respiratory distress  Pt has no wheezes or crackles  Abdominal: Soft  Non-distended, mild abdominal tenderness noted   Bowel sounds are normal   Drain noted with white colored output  Ileal loop draining clear urine    Neurological: awake, alert   Communicative  Skin: Warm    Additional Data:     Labs:    Results from last 7 days   Lab Units 02/21/21  0536 02/20/21  0513   WBC Thousand/uL 13 46* 15 26*   HEMOGLOBIN g/dL 8 7* 9 2*   HEMATOCRIT % 26 1* 28 2*   PLATELETS Thousands/uL 212 197   NEUTROS PCT %  --  76*   LYMPHS PCT %  --  8*   LYMPHO PCT % 2*  --    MONOS PCT %  --  10   MONO PCT % 5  --    EOS PCT % 4 4     Results from last 7 days   Lab Units 02/21/21  0536  02/18/21  1126   SODIUM mmol/L 138   < > 137   POTASSIUM mmol/L 4 2   < > 4 4   CHLORIDE mmol/L 112*   < > 111*   CO2 mmol/L 14*   < > 13*   BUN mg/dL 47*   < > 66*   CREATININE mg/dL 2 70*   < > 3 33*   ANION GAP mmol/L 12   < > 13   CALCIUM mg/dL 7 5*   < > 7 9*   ALBUMIN g/dL  --   --  2 0*   TOTAL BILIRUBIN mg/dL  --   --  0 21   ALK PHOS U/L  --   --  72   ALT U/L  --   --  19   AST U/L  --   --  18   GLUCOSE RANDOM mg/dL 109   < > 142*    < > = values in this interval not displayed  Results from last 7 days   Lab Units 02/19/21  1209   INR  1 25*     Results from last 7 days   Lab Units 02/21/21  1057 02/21/21  0659 02/20/21  2115 02/20/21  1616 02/20/21  1047 02/20/21  2987 02/19/21  2141 02/19/21  1043 02/19/21  0723 02/18/21  2121 02/18/21  1651 02/18/21  1056   POC GLUCOSE mg/dl 170* 93 173* 188* 168* 99 110 170* 98 156* 84 187*     Results from last 7 days   Lab Units 02/18/21  0032   HEMOGLOBIN A1C % 6 0*     Results from last 7 days   Lab Units 02/21/21  0536 02/20/21  0513 02/19/21  0857 02/18/21  1126 02/18/21  0034 02/18/21  0032 02/17/21  2122   LACTIC ACID mmol/L  --   --   --   --   --  0 9 1 9   PROCALCITONIN ng/ml 0 40* 0 65* 0 76* 0 90* 0 90*  --   --            * I Have Reviewed All Lab Data Listed Above  * Additional Pertinent Lab Tests Reviewed:  All Labs Within Last 24 Hours Reviewed    Imaging:    CT guided perc drainage catheter placeme   Final Result by Odalis Jimenez MD (02/19 1742)   Impression:       CT and ultrasound-guided drain placement x2:   Inferior 8-Marshallese Amplatz anchor drain placed into a small linear collection containing thin yellow purulent fluid      Superior 10-Cypriot Elder-Zhao drain placed into a air and debris filled cavity in the right abdominal wall with 60 mL of thick viscous pus      Plan:       Flush the 8-Cypriot inferior catheter with 3 mL daily   Flush the 10-Cypriot superior catheter with 5 mL twice daily   Monitor culture results         Workstation performed: [id number]         XR loopogram   Final Result by Samreen Kline MD (02/19 1443)      Normal fluoroscopic loopogram demonstrating free reflux of contrast into the ureters bilaterally  No extravasated contrast   No communication of the ileal loop with the anterior pelvic collection seen on the prior CT  Workstation performed: [id number]         XR chest 1 view portable   Final Result by Mar North MD (02/18 3776)      No acute cardiopulmonary disease  Workstation performed: [id number]         CT abdomen pelvis wo contrast   Final Result by Lionel Vivas DO (02/17 3931)      Postsurgical changes in the pelvis, status post ileal conduit  Masslike appearance near the small bowel anastomosis and incisional scarring in the right hemipelvis  There is an infiltrative appearance of the surrounding soft tissues  This could be related to scar tissue and fibrosis with adherent bowel loops and    associated collapse, although an associated mass (area measures approximately 4 5 x 5 8 x 5 0 cm in size) could be benign (such as a desmoid tumor) or malignant also considered  An abscess in this region is also considered  (Axial image 72, series 2)      Midline incisional scarring with body wall edema and some infiltration of the fat in the anterior pelvic wall which could be edema versus cellulitis depending on the clinical setting  No hydronephrosis        Coronary atherosclerosis, cholelithiasis without discrete evidence of acute cholecystitis, and other findings as above  Workstation performed: [id number]           Recent Cultures (last 7 days):     Results from last 7 days   Lab Units 02/19/21  1637 02/19/21  1548 02/17/21  2150 02/17/21 2122   BLOOD CULTURE   --   --  No Growth at 72 hrs  No Growth at 72 hrs  GRAM STAIN RESULT  4+ Gram variable rods*  4+ Disintegrating polys* 3+ Gram positive rods*  Rare Gram negative rods*  4+ Disintegrating polys*  --   --    BODY FLUID CULTURE, STERILE  4+ Growth of Escherichia coli*  Few Colonies of Klebsiella-Enterobacter  group* Culture results to follow    --   --        Last 24 Hours Medication List:   Current Facility-Administered Medications   Medication Dose Route Frequency Provider Last Rate    acetaminophen  650 mg Oral Q6H PRN Candice Krishnamurthy MD      aluminum-magnesium hydroxide-simethicone  30 mL Oral Q6H PRN Candice Krishnamurthy MD      aspirin  81 mg Oral Daily Candice Krishnamurthy MD      atorvastatin  40 mg Oral HS Candice Krishnamurthy MD      cefepime  1,000 mg Intravenous Q24H Candice Krishnamurthy MD Stopped (02/20/21 2303)    cholecalciferol  2,000 Units Oral Daily Candice Krishnamurthy MD      citalopram  20 mg Oral Daily Candice Krishnamurthy MD      docusate sodium  100 mg Oral BID PRN Candice Krishnamurthy MD      fish oil  1,000 mg Oral Daily Candice Krishnamurthy MD      fluticasone  2 spray Nasal Daily Candice Krishnamurthy MD      heparin (porcine)  5,000 Units Subcutaneous [facility] Candice Krishnamurthy MD      insulin lispro  1-5 Units Subcutaneous TID [facility] Candice Krishnamurthy MD      insulin lispro  1-5 Units Subcutaneous HS Candice Krishnamurthy MD      metoprolol tartrate  50 mg Oral BID Annie Montenegro MD      metroNIDAZOLE  500 mg Intravenous Q8H Candice Krishnamurthy  mg (02/21/21 0852)    multi-electrolyte  75 mL/hr Intravenous Continuous Annie Montenegro MD 75 mL/hr (02/21/21 0847)   Gar[place] Levi multivitamin-minerals  1 tablet Oral Daily Candice Krishnamurthy MD      nystatin   Topical TID Candice Krishnamurthy MD      ondansetron  4 mg Intravenous Q6H PRN Laverne Jay MD      pantoprazole  40 mg Oral Early Morning Laverne Jay MD      potassium phosphate  12 mmol Intravenous Once Aniceto Dimas MD 12 mmol (02/21/21 1115)    traMADol  50 mg Oral Q6H PRN Laverne Jay MD      vitamin E (tocopherol)  400 Units Oral Daily Laverne Jay MD          Today, Patient Was Seen By: Aniceto Dimas MD    ** Please Note: Dictation voice to text software may have been used in the creation of this document   **

## 2021-02-21 NOTE — ASSESSMENT & PLAN NOTE
Malnutrition Findings:   Adult Malnutrition type: Chronic illness(related to disease/condition <75% energy intake versus neeeds for > 1 month as evidenced by 42#(21%) wt loss since 5/11/20 Treating with CCD3 diet & Glucerna BID as po supplement )  Adult Degree of Malnutrition: Other severe protein calorie malnutrition    BMI Findings: Body mass index is 25 31 kg/m²  Nutrition consult and supplements

## 2021-02-21 NOTE — PLAN OF CARE
Problem: Potential for Falls  Goal: Patient will remain free of falls  Description: INTERVENTIONS:  - Assess patient frequently for physical needs  -  Identify cognitive and physical deficits and behaviors that affect risk of falls    -  [place] fall precautions as indicated by assessment   - Educate patient/family on patient safety including physical limitations  - Instruct patient to call for assistance with activity based on assessment  - Modify environment to reduce risk of injury  - Consider OT/PT consult to assist with strengthening/mobility  Outcome: Progressing     Problem: PAIN - ADULT  Goal: Verbalizes/displays adequate comfort level or baseline comfort level  Description: Interventions:  - Encourage patient to monitor pain and request assistance  - Assess pain using appropriate pain scale  - Administer analgesics based on type and severity of pain and evaluate response  - Implement non-pharmacological measures as appropriate and evaluate response  - Consider cultural and social influences on pain and pain management  - Notify physician/advanced practitioner if interventions unsuccessful or patient reports new pain  Outcome: Progressing     Problem: INFECTION - ADULT  Goal: Absence or prevention of progression during hospitalization  Description: INTERVENTIONS:  - Assess and monitor for signs and symptoms of infection  - Monitor lab/diagnostic results  - Monitor all insertion sites, i e  indwelling lines, tubes, and drains  - Monitor endotracheal if appropriate and nasal secretions for changes in amount and color  - [place] appropriate cooling/warming therapies per order  - Administer medications as ordered  - Instruct and encourage patient and family to use good hand hygiene technique  - Identify and instruct in appropriate isolation precautions for identified infection/condition  Outcome: Progressing     Problem: SAFETY ADULT  Goal: Patient will remain free of falls  Description: INTERVENTIONS:  - Assess patient frequently for physical needs  -  Identify cognitive and physical deficits and behaviors that affect risk of falls    -  [place] fall precautions as indicated by assessment   - Educate patient/family on patient safety including physical limitations  - Instruct patient to call for assistance with activity based on assessment  - Modify environment to reduce risk of injury  - Consider OT/PT consult to assist with strengthening/mobility  Outcome: Progressing  Goal: Maintain or return mobility status to optimal level  Description: INTERVENTIONS:  - Assess patient's baseline mobility status (ambulation, transfers, stairs, etc )    - Identify cognitive and physical deficits and behaviors that affect mobility  - Identify mobility aids required to assist with transfers and/or ambulation (gait belt, sit-to-stand, lift, walker, cane, etc )  - [place] fall precautions as indicated by assessment  - Record patient progress and toleration of activity level on Mobility SBAR; progress patient to next Phase/Stage  - Instruct patient to call for assistance with activity based on assessment  - Consider rehabilitation consult to assist with strengthening/weightbearing, etc   Outcome: Progressing     Problem: DISCHARGE PLANNING  Goal: Discharge to home or other facility with appropriate resources  Description: INTERVENTIONS:  - Identify barriers to discharge w/patient and caregiver  - Arrange for needed discharge resources and transportation as appropriate  - Identify discharge learning needs (meds, wound care, etc )  - Arrange for interpretive services to assist at discharge as needed  - Refer to Case Management Department for coordinating discharge planning if the patient needs post-hospital services based on physician/advanced practitioner order or complex needs related to functional status, cognitive ability, or social support system  Outcome: Progressing     Problem: Knowledge Deficit  Goal: Patient/family/caregiver demonstrates understanding of disease process, treatment plan, medications, and discharge instructions  Description: Complete learning assessment and assess knowledge base  Interventions:  - Provide teaching at level of understanding  - Provide teaching via preferred learning methods  Outcome: Progressing     Problem: Nutrition/Hydration-ADULT  Goal: Nutrient/Hydration intake appropriate for improving, restoring or maintaining nutritional needs  Description: Monitor and assess patient's nutrition/hydration status for malnutrition  Collaborate with interdisciplinary team and initiate plan and interventions as ordered  Monitor patient's weight and dietary intake as ordered or per policy  Utilize nutrition screening tool and intervene as necessary  Determine patient's food preferences and provide high-protein, high-caloric foods as appropriate       INTERVENTIONS:  - Monitor oral intake, urinary output, labs, and treatment plans  - Assess nutrition and hydration status and recommend course of action  - Evaluate amount of meals eaten  - Assist patient with eating if necessary   - Allow adequate time for meals  - Recommend/ encourage appropriate diets, oral nutritional supplements, and vitamin/mineral supplements  - Order, calculate, and assess calorie counts as needed  - Recommend, monitor, and adjust tube feedings and TPN/PPN based on assessed needs  - Assess need for intravenous fluids  - Provide specific nutrition/hydration education as appropriate  - Include patient/family/caregiver in decisions related to nutrition  Outcome: Progressing     Problem: Prexisting or High Potential for Compromised Skin Integrity  Goal: Skin integrity is maintained or improved  Description: INTERVENTIONS:  - Identify patients at risk for skin breakdown  - Assess and monitor skin integrity  - Assess and monitor nutrition and hydration status  - Monitor labs   - Assess for incontinence   - Turn and reposition patient  - Assist with mobility/ambulation  - Relieve pressure over bony prominences  - Avoid friction and shearing  - Provide appropriate hygiene as needed including keeping skin clean and dry  - Evaluate need for skin moisturizer/barrier cream  - Collaborate with interdisciplinary team   - Patient/family teaching  - Consider wound care consult   Outcome: Progressing

## 2021-02-21 NOTE — PHYSICAL THERAPY NOTE
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)    History of bladder cancer    Elevated troponin    Anxiety and depression    Right sided Pelvic abscess in male [facility])    Severe protein-calorie malnutrition (HCC)    Electrolyte disturbance    low HCO3 with normal Anion gap       Past Medical History  Past Medical History:   Diagnosis Date    Acute kidney injury ([facility] )     Arthritis     Hands    Wright esophagus     BPH with obstruction/lower urinary tract symptoms     CAD (coronary artery disease)     Last assessed 09/16/15    Cancer ([facility] )     bladder    Cataract, acquired     Last assessed 10/10/17    Coronary artery disease     Diabetes mellitus ([facility] )     NIDDM    Diabetic neuropathy ([facility] )     Feet    Enlarged prostate with lower urinary tract symptoms (LUTS)     Last assessed 10/10/17    Erectile dysfunction     GERD (gastroesophageal reflux disease)     Last assessed 10/10/17    Hemoptysis     Last assessed 03/14/16    Hypercholesterolemia     Last assessed 10/10/17    Hypertension     Last assessed 10/10/17    Macular degeneration     Right eye is particularly affected    Myocardial infarction ([facility] ) 1998    OAB (overactive bladder)     Testicular hypofunction     Testicular hypogonadism     Last assessed 10/10/17    Tinnitus     Umbilical hernia     Last assessed 06/18/14    Urge incontinence of urine     Wears glasses         Past Surgical History  Past Surgical History:   Procedure Laterality Date    COLONOSCOPY      CORONARY ARTERY BYPASS GRAFT  07/16/2014    ABG x 4 LIMA to LAD,SVG to diagnoal 2 SVG to OM-1, SVG to PDA, resection of partial plerual mass    CT GUIDED PERC DRAINAGE CATHETER PLACEMENT  2/19/2021    CYSTOSCOPY  2013    ESOPHAGOGASTRODUODENOSCOPY      FL RETROGRADE PYELOGRAM  12/20/2018    FL RETROGRADE PYELOGRAM  12/5/2019    INGUINAL HERNIA REPAIR Bilateral 2015    PHOTODYNAMIC THERAPY      For Barretts esophagus    TX COLONOSCOPY FLX DX W/COLLJ SPEC WHEN PFRMD N/A 4/25/2016    Procedure: COLONOSCOPY;  Surgeon: Lashawn Cage MD;  Location:  GI LAB; Service: Gastroenterology    TX CYSTOURETHROSCOPY,BIOPSY N/A 9/10/2020    Procedure: CYSTOSCOPY, COLLECTION OF LEFT KIDNEY CYTOLOGY, BILATERAL RETROGRADE PYELOGRAM, BLADDER WALL BIOPSIES  AND [address], RANDOM BLADDER TUMOR BIOPSIES;  Surgeon: Abdulaziz Salazar MD;  Location: [address] [place] [address] MAIN OR;  Service: Urology    TX CYSTOURETHROSCOPY,FULGUR 2-5 CM LESN N/A 4/16/2020    Procedure: CYSTOSCOPY, TRANSURETHRAL RESECTION OF BLADDER TUMOR (TURBT);   Surgeon: Abdulaziz Salazar MD;  [facility];  Service: Urology    TX CYSTOURETHROSCOPY,FULGUR <0 5 CM LESN N/A 12/5/2019    Procedure: CYSTO W/TURBT AND TRANSURETHRAL PROSTATE BIOPSY AND OPENING OF BLADDER NECK CONTRACTURE, B/L Retrograde pyelogram;  Surgeon: Abdulaziz Salazar MD;  [facility];  Service: Urology    TONSILLECTOMY      TRANSURETHRAL RESECTION OF PROSTATE N/A 12/20/2018    Procedure: CYSTO, PHOTO SELECTIVE VAPORIZATION OF PROSTATE, B/L RETROGRADE PYELOGRAM, TURBT;  Surgeon: Abdulaziz Salazar MD;  Location[facility];  Service: Urology    UMBILICAL HERNIA REPAIR  2012    UPPER GASTROINTESTINAL ENDOSCOPY           02/21/21 1358   PT Last Visit   PT Visit Date 02/21/21   Note Type   Note type Evaluation   Pain Assessment   Pain Assessment Tool 0-10   Pain Score No Pain   Home Living   Type of Home House   Home Layout Two level;1/2 bath on main level  (2-3 NANO)   Home Equipment Cane   Prior Function   Level of [place] Independent with ADLs and functional mobility   Lives With Spouse   ADL Assistance Independent   IADLs Independent   Falls in the last 6 months 1 to 4  (admits to multiple, but at least "1 bad one")   Vocational Retired   Comments pt reports MI w/ SPC in home- whit is retired and can A on d/c    Restrictions/Precautions   Wells Chiara Bearing Precautions Per Order No   Braces or Orthoses   (none )   Other Precautions Fall Risk;Telemetry;Multiple lines; Impulsive; Chair Alarm; Bed Alarm  (KIKI x2)   Cognition   Overall Cognitive Status Impaired   Attention Attends with cues to redirect   Orientation Level Oriented X4  (inc time for date)   Memory Decreased recall of recent events;Decreased recall of precautions;Decreased short term memory   Following Commands Follows one step commands with increased time or repetition   Comments pleasant and cooperative    RUE Assessment   RUE Assessment   (generalized weakness but equal b/l)   LUE Assessment   LUE Assessment   (generalized weakness but equal b/l)   RLE Assessment   RLE Assessment WFL   LLE Assessment   LLE Assessment WFL   Light Touch   RLE Light Touch Grossly intact   LLE Light Touch Grossly intact   Bed Mobility   Supine to Sit 3  Moderate assistance   Additional items Assist x 1; Increased time required   Additional Comments OOB in recliner at completion of session- all need sin reach- spouse and son in to visit-    Transfers   Sit to Stand 4  Minimal assistance   Additional items Assist x 1; Increased time required;Verbal cues   Stand to Sit 4  Minimal assistance   Additional items Assist x 1; Increased time required;Verbal cues  (cues for safety and hand placement on chair / carryover- saf)   Stand pivot 4  Minimal assistance   Additional items Assist x 1;Trapeze bar;Verbal cues   Ambulation/Elevation   Gait pattern Excessively slow; Step to; Foward flexed   Gait Assistance 4  Minimal assist   Additional items Verbal cues; Tactile cues   Assistive Device Rolling walker   Distance 40'x1 w/ 1 standing rest break -    Balance   Static Sitting Fair - Dynamic Sitting Fair -   Static Standing Poor +   Dynamic Standing Poor +   Ambulatory Poor  (rw)   Endurance Deficit   Endurance Deficit Yes   Endurance Deficit Description limited ambulation distnaces- fatigue    Activity Tolerance   Activity Tolerance Patient limited by fatigue   Medical Staff Made Aware s/w RN -post session for updates-    Nurse Made Aware yes- cleared for session                                 Assessment   Prognosis Good   Problem List Decreased strength;Decreased range of motion;Decreased endurance; Impaired balance;Decreased mobility; Decreased coordination;Decreased cognition; Impaired judgement;Decreased safety awareness;Decreased skin integrity;Pain  (drains )   Assessment Pt is [de-identified] y o  male seen for PT evaluation s/p admit to [facility] on 2/17/2021  Pt presenting w/ abdominal discomfort and mass-like structure beneath the ileal conduit  CT abdomen demonstrated mass that was suspicious for infection  Active problems include: sepsis, central abdominal mass, KO, electrolyte disturbance, severe protein-calorie malnutrition, anxiety and depression, elevated troponin, CAD, h/o bladder cancer s/p radial cysto prostatectomy with ileal conduit, DM with mild non-proliferative retinopathy b/l eyes without macular edema, HDL and benign essential HTN  Pt resides with his wife in a [facility] with 3-5STE with 1/2 bathroom on the first floor  Personal factors affecting pt at time of IE include: steps to enter environment, ? Cognition ; depression; advanced age, past experience, inability to perform IADLs, inability to perform ADLs, inability to ambulate household distances, limited insight into impairments    Due to acute medical issues, ongoing medical workup for primary dx; pain, fall risk, increased reliance on more restrictive AD compared to baseline;  decreased activity tolerance compared to baseline, increased assistance needed from caregiver at current time, continuous telemetry monitoring, KIKI x2; multiple lines, decline in overall functional mobility status; health management issues; note unstable clinical picture (high complexity)   Currently pt  is requiring modA A for bed skills; Rani for functional transfers and Rani for ambulation w/ RW 40' x1 w/ 1 standing rest break  Pt presents functioning below baseline and currently w/ overall mobility deficits 2* to: decreased LE strength/AROM; limited flexibility;  generalized weakness/ deconditioning; decreased endurance; decreased activity tolerance; decreased coordination; impaired balance; gait deviations; decreased safety awareness; SOB/ELIAS; fatigue; impaired safety and judgement; limited insight into current deficits; bed/ chair alarms; multiple lines; hearing impairment/ visual impairments; cognition  Pt currently at risk for falls  (Please find additional objective findings from PT assessment regarding body systems outlined above ) Pt will continue to benefit from skilled PT interventions to address state  d impairments; to maximize functional potential; for ongoing pt/ family training; and DME needs  PT is currently recommending d/c to inpatient rehab setting when medically cleared for safety and to maximize functional potential prior to d/c home  Anila Mae Pt/ family agr   Barriers to Discharge Inaccessible home environment   Goals   Patient Goals more indep    STG Expiration Date 03/03/21   Short Term Goal #1 In 10 days pt will complete: 1) Bed mobility skills with MI to facilitate safe return to previous living environment 2) Functional transfers with MI  to facilitate safe return to previous living environment  3) Ambulation with least restrictive AD MI> 300' without LOB and stable vitals for safe ambulation home/ community distances  4) Stair training up/ down 3 step/s with 1 rail/s  and S for safe access to previous living environment   5) Improve balance grades to Good 6) Improve LE strength grades by 1 to increase independence w/ transfers and gait  7) LE HEP independently  8) PT for ongoing pt and family education; DME needs and D/C planning to promote highest level of function in least restrictive environment   PT Treatment Day 0   Plan   Treatment/Interventions Functional transfer training;LE strengthening/ROM; Elevations; Therapeutic exercise; Endurance training;Cognitive reorientation;Patient/family training;Equipment eval/education; Bed mobility;Gait training;Spoke to nursing;Family   PT Frequency   (3-5x/wk )   Recommendation   PT Discharge Recommendation Post-Acute Rehabilitation Services   Equipment Recommended Walker   PT - OK to Discharge Yes  (to rehab when medically cleared )   Additional Comments up in recliner w/ all needs in reach post session- family visiting-    Additional Comments 2 education in seated HEP and C/L use- fall prevention    AM-PAC Basic Mobility Inpatient   Turning in Bed Without Bedrails 3   Lying on Back to Sitting on Edge of Flat Bed 2   Moving Bed to Chair 3   Standing Up From Chair 3   Walk in Room 3   Climb 3-5 Stairs 2   Basic Mobility Inpatient Raw Score 16   Basic Mobility Standardized Score 38 32       Pt is [de-identified] y o  male seen for PT evaluation s/p admit to [facility] on 2/17/2021  Pt presenting w/ abdominal discomfort and mass-like structure beneath the ileal conduit  CT abdomen demonstrated mass that was suspicious for infection  Active problems include: sepsis, central abdominal mass, KO, electrolyte disturbance, severe protein-calorie malnutrition, anxiety and depression, elevated troponin, CAD, h/o bladder cancer s/p radial cysto prostatectomy with ileal conduit, DM with mild non-proliferative retinopathy b/l eyes without macular edema, HDL and benign essential HTN  Pt resides with his wife in a [facility] with 3-5STE with 1/2 bathroom on the first floor  Personal factors affecting pt at time of IE include: steps to enter environment, ?  Cognition ; depression; advanced age, past experience, inability to perform IADLs, inability to perform ADLs, inability to ambulate household distances, limited insight into impairments  Due to acute medical issues, ongoing medical workup for primary dx; pain, fall risk, increased reliance on more restrictive AD compared to baseline;  decreased activity tolerance compared to baseline, increased assistance needed from caregiver at current time, continuous telemetry monitoring, KIKI x2; multiple lines, decline in overall functional mobility status; health management issues; note unstable clinical picture (high complexity)   Currently pt  is requiring modA A for bed skills; Rani for functional transfers and Rani for ambulation w/ RW 40' x1 w/ 1 standing rest break  Pt presents functioning below baseline and currently w/ overall mobility deficits 2* to: decreased LE strength/AROM; limited flexibility;  generalized weakness/ deconditioning; decreased endurance; decreased activity tolerance; decreased coordination; impaired balance; gait deviations; decreased safety awareness; SOB/ELIAS; fatigue; impaired safety and judgement; limited insight into current deficits; bed/ chair alarms; multiple lines; hearing impairment/ visual impairments; cognition  Pt currently at risk for falls  (Please find additional objective findings from PT assessment regarding body systems outlined above ) Pt will continue to benefit from skilled PT interventions to address state  d impairments; to maximize functional potential; for ongoing pt/ family training; and DME needs  PT is currently recommending d/c to inpatient rehab setting when medically cleared for safety and to maximize functional potential prior to d/c home  Susanna Echevarria Pt/ family agreeable to plan and goals as stated on evaluation- family visiting at completion of session- confirms PLOF and soc hx- spouse reports in good health and able to A on d/c       The patient's AM-PAC Basic Mobility Inpatient Short Form Raw Score is 16, Standardized Score is 38 32  A standardized score less than 42 9 suggests the patient may benefit from discharge to post-acute rehabilitation services  Please also refer to the recommendation of the Physical Therapist for safe discharge planning  PHYSICAL THERAPY TREATMENT [id number]    Pt seen for skilled PT session following evaluation consisting of instruction in seated therex/ HEP instruction; for increased LE strengthening to assist w/ increasing independence w/ transfers and gait  Pt tolerates therex w/o complaints or increase in pain or SOB- completed 20 reps AP LAQ; seated marching and STS from chair w/ Rani and cues for forward weight shift   Will continue to  benefit from repeated instruction for correct technique and compliance  - pt aware he is not to attempt STS w/o S/A of another for safety at this time- was encouraged to be OOB for all meals and perform seated therex 3x daily   Katelyn Phipps PT

## 2021-02-21 NOTE — PROGRESS NOTES
Progress Note - Urology Progress  Consuelo Frias [de-identified] y o  male MRN: [id number]  Unit/Bed#: [facility] 723-01 Encounter: [id number]    Assessment:  Right pelvic abscess-status post drainage, the loopogram is negative and there does not appear to be any fistula or connection to the abscess cavity from the ileal loop  His white count continues to improve  Cultures are still pending  TCC bladder status post radical cysto prostatectomy with ileal conduit at [address] [place] [address]:  Continue antibiotics and present therapy  Await culture results  Drain management as per interventional Radiology  Consider physical therapy  Subjective/Objective       Subjective:  He notes continues to feel better since drainage of the right lower quadrant abscess  He notes that he feels weak and is difficult to get around  Objective:     Blood pressure 143/71, pulse 71, temperature 98 1 °F (36 7 °C), resp  rate 17, height 5' 8" (1 727 m), weight 75 5 kg (166 lb 7 2 oz), SpO2 97 %  ,Body mass index is 25 31 kg/m²  Intake/Output Summary (Last 24 hours) at 2/21/2021 1110  Last data filed at 2/21/2021 2405  Gross per 24 hour   Intake 3495 ml   Output 2604 ml   Net 891 ml       Invasive Devices     Peripheral Intravenous Line            Peripheral IV 02/19/21 Right Forearm 1 day    Peripheral IV 02/20/21 Proximal;Right;Ventral (anterior) Forearm 1 day          Drain            Urostomy -- days    Closed/Suction Drain Inferior Abdomen Bulb 10 Fr  1 day    Closed/Suction Drain Inferior Abdomen Bulb 8 Fr  1 day                Review of Systems   Constitutional: Positive for activity change, appetite change and fatigue  Negative for chills, diaphoresis and fever  HENT: Negative  Eyes: Negative  Respiratory: Negative  Cardiovascular: Negative  Gastrointestinal: Negative for abdominal pain  Endocrine: Negative  Genitourinary:        See HPI   Musculoskeletal: Negative      Skin: Negative  Allergic/Immunologic: Negative  Neurological: Negative  Hematological: Negative  Psychiatric/Behavioral: Negative  All other systems reviewed and are negative  Physical Exam: /71   Pulse 71   Temp 98 1 °F (36 7 °C)   Resp 17   Ht 5' 8" (1 727 m) Comment: 1/18/21 encounter  Wt 75 5 kg (166 lb 7 2 oz)   SpO2 97%   BMI 25 31 kg/m²   General appearance: alert and oriented, in no acute distress  Head: Normocephalic, without obvious abnormality, atraumatic  Neck: supple, symmetrical, trachea midline  Back: symmetric, no curvature  ROM normal  No CVA tenderness  Lungs: Normal respiratory effort  Heart: regular rate and rhythm  Abdomen: Soft, decreased fullness and tenderness right lower quadrant, ileal loop draining clear urine  Male genitalia: normal  Extremities: extremities normal, warm and well-perfused; no cyanosis, clubbing, or edema  Neurologic: Grossly normal    Lab, Imaging and other studies:  I have personally reviewed pertinent lab results    , CBC:   Lab Results   Component Value Date    WBC 13 46 (H) 02/21/2021    HGB 8 7 (L) 02/21/2021    HCT 26 1 (L) 02/21/2021    MCV 93 02/21/2021     02/21/2021    MCH 31 1 02/21/2021    MCHC 33 3 02/21/2021    RDW 14 5 02/21/2021    MPV 11 3 02/21/2021    NRBC 0 02/21/2021   , CMP:   Lab Results   Component Value Date    SODIUM 138 02/21/2021    K 4 2 02/21/2021     (H) 02/21/2021    CO2 14 (L) 02/21/2021    BUN 47 (H) 02/21/2021    CREATININE 2 70 (H) 02/21/2021    CALCIUM 7 5 (L) 02/21/2021    EGFR 21 02/21/2021

## 2021-02-22 LAB
ANION GAP SERPL CALCULATED.3IONS-SCNC: 7 MMOL/L (ref 4–13)
BACTERIA SPEC ANAEROBE CULT: ABNORMAL
BACTERIA SPEC BFLD CULT: ABNORMAL
BUN SERPL-MCNC: 41 MG/DL (ref 5–25)
CALCIUM SERPL-MCNC: 8 MG/DL (ref 8.3–10.1)
CHLORIDE SERPL-SCNC: 114 MMOL/L (ref 100–108)
CO2 SERPL-SCNC: 18 MMOL/L (ref 21–32)
CREAT SERPL-MCNC: 2.4 MG/DL (ref 0.6–1.3)
ERYTHROCYTE [DISTWIDTH] IN BLOOD BY AUTOMATED COUNT: 14.9 % (ref 11.6–15.1)
GFR SERPL CREATININE-BSD FRML MDRD: 25 ML/MIN/1.73SQ M
GLUCOSE SERPL-MCNC: 107 MG/DL (ref 65–140)
GLUCOSE SERPL-MCNC: 110 MG/DL (ref 65–140)
GLUCOSE SERPL-MCNC: 175 MG/DL (ref 65–140)
GLUCOSE SERPL-MCNC: 192 MG/DL (ref 65–140)
GLUCOSE SERPL-MCNC: 199 MG/DL (ref 65–140)
GRAM STN SPEC: ABNORMAL
GRAM STN SPEC: ABNORMAL
HCT VFR BLD AUTO: 28.5 % (ref 36.5–49.3)
HGB BLD-MCNC: 9.2 G/DL (ref 12–17)
MAGNESIUM SERPL-MCNC: 2 MG/DL (ref 1.6–2.6)
MCH RBC QN AUTO: 30.9 PG (ref 26.8–34.3)
MCHC RBC AUTO-ENTMCNC: 32.3 G/DL (ref 31.4–37.4)
MCV RBC AUTO: 96 FL (ref 82–98)
NRBC BLD AUTO-RTO: 0 /100 WBCS
PHOSPHATE SERPL-MCNC: 1.9 MG/DL (ref 2.3–4.1)
PLATELET # BLD AUTO: 230 THOUSANDS/UL (ref 149–390)
PMV BLD AUTO: 11.8 FL (ref 8.9–12.7)
POTASSIUM SERPL-SCNC: 5 MMOL/L (ref 3.5–5.3)
PROCALCITONIN SERPL-MCNC: 0.55 NG/ML
RBC # BLD AUTO: 2.98 MILLION/UL (ref 3.88–5.62)
SODIUM SERPL-SCNC: 139 MMOL/L (ref 136–145)
WBC # BLD AUTO: 14.35 THOUSAND/UL (ref 4.31–10.16)

## 2021-02-22 PROCEDURE — 97535 SELF CARE MNGMENT TRAINING: CPT

## 2021-02-22 PROCEDURE — 84100 ASSAY OF PHOSPHORUS: CPT | Performed by: INTERNAL MEDICINE

## 2021-02-22 PROCEDURE — 84145 PROCALCITONIN (PCT): CPT | Performed by: INTERNAL MEDICINE

## 2021-02-22 PROCEDURE — 82948 REAGENT STRIP/BLOOD GLUCOSE: CPT

## 2021-02-22 PROCEDURE — 85027 COMPLETE CBC AUTOMATED: CPT | Performed by: INTERNAL MEDICINE

## 2021-02-22 PROCEDURE — 99223 1ST HOSP IP/OBS HIGH 75: CPT | Performed by: INTERNAL MEDICINE

## 2021-02-22 PROCEDURE — 99232 SBSQ HOSP IP/OBS MODERATE 35: CPT | Performed by: PHYSICIAN ASSISTANT

## 2021-02-22 PROCEDURE — 80048 BASIC METABOLIC PNL TOTAL CA: CPT | Performed by: INTERNAL MEDICINE

## 2021-02-22 PROCEDURE — 99232 SBSQ HOSP IP/OBS MODERATE 35: CPT | Performed by: INTERNAL MEDICINE

## 2021-02-22 PROCEDURE — 83735 ASSAY OF MAGNESIUM: CPT | Performed by: INTERNAL MEDICINE

## 2021-02-22 RX ORDER — SODIUM CHLORIDE 9 MG/ML
5 INJECTION INTRAVENOUS DAILY
Qty: 60 SYRINGE | Refills: 3 | Status: SHIPPED | OUTPATIENT
Start: 2021-02-22 | End: 2021-02-25 | Stop reason: SDUPTHER

## 2021-02-22 RX ORDER — ECHINACEA PURPUREA EXTRACT 125 MG
1 TABLET ORAL
Status: DISCONTINUED | OUTPATIENT
Start: 2021-02-22 | End: 2021-02-26 | Stop reason: HOSPADM

## 2021-02-22 RX ADMIN — HEPARIN SODIUM 5000 UNITS: 5000 INJECTION INTRAVENOUS; SUBCUTANEOUS at 05:33

## 2021-02-22 RX ADMIN — ATORVASTATIN CALCIUM 40 MG: 20 TABLET, FILM COATED ORAL at 22:29

## 2021-02-22 RX ADMIN — PANTOPRAZOLE SODIUM 40 MG: 40 TABLET, DELAYED RELEASE ORAL at 05:33

## 2021-02-22 RX ADMIN — METOPROLOL TARTRATE 50 MG: 25 TABLET, FILM COATED ORAL at 10:00

## 2021-02-22 RX ADMIN — Medication 2000 UNITS: at 09:59

## 2021-02-22 RX ADMIN — DOCUSATE SODIUM 100 MG: 100 CAPSULE, LIQUID FILLED ORAL at 09:59

## 2021-02-22 RX ADMIN — INSULIN LISPRO 1 UNITS: 100 INJECTION, SOLUTION INTRAVENOUS; SUBCUTANEOUS at 18:07

## 2021-02-22 RX ADMIN — SODIUM CHLORIDE, SODIUM GLUCONATE, SODIUM ACETATE, POTASSIUM CHLORIDE, MAGNESIUM CHLORIDE, SODIUM PHOSPHATE, DIBASIC, AND POTASSIUM PHOSPHATE 75 ML/HR: .53; .5; .37; .037; .03; .012; .00082 INJECTION, SOLUTION INTRAVENOUS at 07:45

## 2021-02-22 RX ADMIN — Medication 400 UNITS: at 10:00

## 2021-02-22 RX ADMIN — Medication 1 TABLET: at 09:59

## 2021-02-22 RX ADMIN — HEPARIN SODIUM 5000 UNITS: 5000 INJECTION INTRAVENOUS; SUBCUTANEOUS at 18:03

## 2021-02-22 RX ADMIN — OMEGA-3 FATTY ACIDS CAP 1000 MG 1000 MG: 1000 CAP at 09:59

## 2021-02-22 RX ADMIN — DOCUSATE SODIUM 100 MG: 100 CAPSULE, LIQUID FILLED ORAL at 18:08

## 2021-02-22 RX ADMIN — SODIUM CHLORIDE, SODIUM GLUCONATE, SODIUM ACETATE, POTASSIUM CHLORIDE, MAGNESIUM CHLORIDE, SODIUM PHOSPHATE, DIBASIC, AND POTASSIUM PHOSPHATE 75 ML/HR: .53; .5; .37; .037; .03; .012; .00082 INJECTION, SOLUTION INTRAVENOUS at 20:42

## 2021-02-22 RX ADMIN — METRONIDAZOLE 500 MG: 500 INJECTION, SOLUTION INTRAVENOUS at 09:59

## 2021-02-22 RX ADMIN — CITALOPRAM HYDROBROMIDE 20 MG: 20 TABLET ORAL at 09:59

## 2021-02-22 RX ADMIN — METOPROLOL TARTRATE 50 MG: 25 TABLET, FILM COATED ORAL at 18:11

## 2021-02-22 RX ADMIN — HEPARIN SODIUM 5000 UNITS: 5000 INJECTION INTRAVENOUS; SUBCUTANEOUS at 22:29

## 2021-02-22 RX ADMIN — METRONIDAZOLE 500 MG: 500 INJECTION, SOLUTION INTRAVENOUS at 18:02

## 2021-02-22 RX ADMIN — ASPIRIN 81 MG: 81 TABLET, CHEWABLE ORAL at 09:59

## 2021-02-22 RX ADMIN — FLUTICASONE PROPIONATE 2 SPRAY: 50 SPRAY, METERED NASAL at 09:59

## 2021-02-22 RX ADMIN — METRONIDAZOLE 500 MG: 500 INJECTION, SOLUTION INTRAVENOUS at 00:56

## 2021-02-22 RX ADMIN — Medication 1 SPRAY: at 18:08

## 2021-02-22 RX ADMIN — INSULIN LISPRO 1 UNITS: 100 INJECTION, SOLUTION INTRAVENOUS; SUBCUTANEOUS at 11:47

## 2021-02-22 RX ADMIN — SODIUM PHOSPHATE, MONOBASIC, MONOHYDRATE 12 MMOL: 276; 142 INJECTION, SOLUTION INTRAVENOUS at 11:44

## 2021-02-22 RX ADMIN — SODIUM CHLORIDE 3 G: 9 INJECTION, SOLUTION INTRAVENOUS at 18:02

## 2021-02-22 RX ADMIN — NYSTATIN: 100000 POWDER TOPICAL at 18:11

## 2021-02-22 RX ADMIN — NYSTATIN: 100000 POWDER TOPICAL at 09:59

## 2021-02-22 RX ADMIN — INSULIN LISPRO 1 UNITS: 100 INJECTION, SOLUTION INTRAVENOUS; SUBCUTANEOUS at 22:30

## 2021-02-22 NOTE — ASSESSMENT & PLAN NOTE
radical cysto prostatectomy with ileal conduit at [facility]  Urology on board  Rest As above

## 2021-02-22 NOTE — PROGRESS NOTES
Progress Note - Domenica Ganser 1940, [de-identified] y o  male MRN: [id number]    Unit/Bed#: [facility] 723-01 Encounter: [id number]    Primary Care Provider: Adriana Carlton DO   Date and time admitted to hospital: 2/17/2021  8:52 PM        * Sepsis [facility])  Assessment & Plan  · POA with leukocytosis and tachycardia  · Likely source right pelvic abscess  ·  Known h/o radical cysto prostatectomy with ileal conduit at [facility]  · Blood cultures negative at 4 days  · UA relatively unremarkable for significant infection  · Procalcitonin elevated at 0 9>> 0 76>> 0;4, Leukocytosis trending down as well  · CT abdomen pelvis concerning for anterior abdominal wall cellulitis and/ or abscess in surgical area  · Status post IR guided drainage and drain placement of a right pelvic abscess on 2/19  · Drainage Cultures from IR are growing E coli and Klebsiella so far pending susceptibilities  Gram-positive rods also noted  PLAN:  · Continue IV cefepime Flagyl, day 5   · Follow blood culture results  · Follow culture data from the drainage fluid collection in IR    · ID consult to decide regarding duration of antibiotics     · Trend procalcitonin  · Urology on board, appreciate input  · Trend CBC  Right sided Pelvic abscess in male [facility])  Assessment & Plan  Now status post IR guided drain placement on 02/19  Continue IV antibiotics and follow cultures  Rest as above  Urology following, appreciate input  Acute kidney injury [facility])  Assessment & Plan  Baseline creatinine between 1-2  POA with creatinine of 3 59  I suspect this is likely related to volume depletion and sepsis could be contributing along with ARB use  Slowly improving, down to 2 4 today  No hydronephrosis on CT scan  UA shows 0-3 fine and coarse granular cast with some proteinuria and mild hematuria  Continue IV hydration  Hold ARB  Trend BMP for now  If no further improvement, will consider Nephrology evaluation      low HCO3 with normal Anion gap  Assessment & Plan  Likely related to hyperchloremia  Noted underlying KO  Monitor for now    Electrolyte disturbance  Assessment & Plan  Severe hypomagnesemia and hypophosphatemia likely secondary to poor p o  Intake and malnutrition  Improving  Trend and replete as needed    Severe protein-calorie malnutrition ([address] )  Assessment & Plan  Malnutrition Findings:   Adult Malnutrition type: Chronic illness(related to disease/condition <75% energy intake versus neeeds for > 1 month as evidenced by 42#(21%) wt loss since 5/11/20 Treating with CCD3 diet & Glucerna BID as po supplement )  Adult Degree of Malnutrition: Other severe protein calorie malnutrition    BMI Findings: Body mass index is 25 31 kg/m²  Nutrition consult and supplements  Anxiety and depression  Assessment & Plan  Continue Celexa 20 mg daily  Elevated troponin  Assessment & Plan  Likely non MI elevation due to above  No chest discomfort and troponin trending down  History of bladder cancer  Assessment & Plan  radical cysto prostatectomy with ileal conduit at [facility]  Urology on board  Rest As above    Type 2 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 [facility])  Assessment & Plan  Lab Results   Component Value Date    HGBA1C 6 0 (H) 02/18/2021     Hemoglobin A1c; glucose checks per protocol with insulin sliding scale  Recent Labs     02/21/21  1616 02/21/21  2133 02/22/21  0655 02/22/21  1110   POCGLU 177* 180* 107 192*       Blood Sugar Average: Last 72 hrs:  (P) 803 [id number]     iss and accucheks  Hypoglycemia protocol      Hyperlipidemia  Assessment & Plan  Atorvastatin 40 mg to continue    Benign essential hypertension  Assessment & Plan  Continue metoprolol 50 mg b i d  With holding parameters  ARB on hold due to acute kidney injury        VTE Pharmacologic Prophylaxis:   Pharmacologic: Heparin  Mechanical VTE Prophylaxis in Place: Yes    Patient Centered Rounds: I have performed bedside rounds with nursing staff today  Discussions with Specialists or Other Care Team Provider:  , ID    Education and Discussions with Family / Patient:  Discussed plan of care with patient  Called and left a voicemail patient's wife  Time Spent for Care: 30 minutes  More than 50% of total time spent on counseling and coordination of care as described above  Current Length of Stay: 5 day(s)    Current Patient Status: Inpatient   Certification Statement: The patient will continue to require additional inpatient hospital stay due to Pending culture results and antibiotic decision    Discharge Plan:  Rehab when medically stable    Code Status: Level 1 - Full Code      Subjective:   Overnight events reported  Patient continues to feel better and appetite is improving  He mentions that he felt some thread like sensation in back of his throat but no dysphagia or dyspnea  Objective:     Vitals:   Temp (24hrs), Av 6 °F (36 4 °C), Min:97 3 °F (36 3 °C), Max:98 °F (36 7 °C)    Temp:  [97 3 °F (36 3 °C)-98 °F (36 7 °C)] 97 3 °F (36 3 °C)  HR:  [54-68] 68  Resp:  [18-21] 21  BP: (131-146)/(58-69) 146/64  SpO2:  [97 %-99 %] 97 %  Body mass index is 25 31 kg/m²  Input and Output Summary (last 24 hours): Intake/Output Summary (Last 24 hours) at 2021 1512  Last data filed at 2021 1331  Gross per 24 hour   Intake 185 ml   Output 1455 ml   Net -1270 ml       Physical Exam:     Physical Exam  Constitutional:       General: He is not in acute distress  HENT:      Mouth/Throat:      Mouth: Mucous membranes are moist       Pharynx: Oropharynx is clear  No oropharyngeal exudate or posterior oropharyngeal erythema  Comments: No foreign body  Cardiovascular:      Rate and Rhythm: Normal rate and regular rhythm  Heart sounds: Normal heart sounds  No murmur  Pulmonary:      Effort: No respiratory distress  Breath sounds: Normal breath sounds   No wheezing or rales    Abdominal:      General: Bowel sounds are normal  There is no distension  Palpations: Abdomen is soft  Tenderness: There is no abdominal tenderness  Comments: Ileal conduit draining clear urine  IR drain noted with white-colored discharge   Musculoskeletal:         General: No swelling  Skin:     General: Skin is warm  Neurological:      Mental Status: He is alert  Comments: Awake alert and communicative           Additional Data:     Labs:    Results from last 7 days   Lab Units 02/22/21  0529 02/21/21  0536 02/20/21  0513   WBC Thousand/uL 14 35* 13 46* 15 26*   HEMOGLOBIN g/dL 9 2* 8 7* 9 2*   HEMATOCRIT % 28 5* 26 1* 28 2*   PLATELETS Thousands/uL 230 212 197   NEUTROS PCT %  --   --  76*   LYMPHS PCT %  --   --  8*   LYMPHO PCT %  --  2*  --    MONOS PCT %  --   --  10   MONO PCT %  --  5  --    EOS PCT %  --  4 4     Results from last 7 days   Lab Units 02/22/21  0529  02/18/21  1126   SODIUM mmol/L 139   < > 137   POTASSIUM mmol/L 5 0   < > 4 4   CHLORIDE mmol/L 114*   < > 111*   CO2 mmol/L 18*   < > 13*   BUN mg/dL 41*   < > 66*   CREATININE mg/dL 2 40*   < > 3 33*   ANION GAP mmol/L 7   < > 13   CALCIUM mg/dL 8 0*   < > 7 9*   ALBUMIN g/dL  --   --  2 0*   TOTAL BILIRUBIN mg/dL  --   --  0 21   ALK PHOS U/L  --   --  72   ALT U/L  --   --  19   AST U/L  --   --  18   GLUCOSE RANDOM mg/dL 110   < > 142*    < > = values in this interval not displayed       Results from last 7 days   Lab Units 02/19/21  1209   INR  1 25*     Results from last 7 days   Lab Units 02/22/21  1110 02/22/21  0655 02/21/21  2133 02/21/21  1616 02/21/21  1057 02/21/21  0659 02/20/21  2115 02/20/21  1616 02/20/21  1047 02/20/21  0638 02/19/21  2141 02/19/21  1043   POC GLUCOSE mg/dl 192* 107 180* 177* 170* 93 173* 188* 168* 99 110 170*     Results from last 7 days   Lab Units 02/18/21  0032   HEMOGLOBIN A1C % 6 0*     Results from last 7 days   Lab Units 02/22/21  0529 02/21/21  0536 02/20/21  1074 02/19/21  0857 02/18/21  1126  02/18/21  0032 02/17/21  2122   LACTIC ACID mmol/L  --   --   --   --   --   --  0 9 1 9   PROCALCITONIN ng/ml 0 55* 0 40* 0 65* 0 76* 0 90*   < >  --   --     < > = values in this interval not displayed  * I Have Reviewed All Lab Data Listed Above  * Additional Pertinent Lab Tests Reviewed: All Labs Within Last 24 Hours Reviewed    Imaging:    CT guided perc drainage catheter placeme   Final Result by Iraj Arriaga MD (02/19 1742)   Impression:       CT and ultrasound-guided drain placement x2:   Inferior 8-Barbadian Amplatz anchor drain placed into a small linear collection containing thin yellow purulent fluid      Superior 10-Barbadian Elder-Zhao drain placed into a air and debris filled cavity in the right abdominal wall with 60 mL of thick viscous pus      Plan:       Flush the 8-Barbadian inferior catheter with 3 mL daily   Flush the 10-Barbadian superior catheter with 5 mL twice daily   Monitor culture results         Workstation performed: [id number]         XR loopogram   Final Result by Casimiro Aguero MD (02/19 1443)      Normal fluoroscopic loopogram demonstrating free reflux of contrast into the ureters bilaterally  No extravasated contrast   No communication of the ileal loop with the anterior pelvic collection seen on the prior CT  Workstation performed: [id number]         XR chest 1 view portable   Final Result by Ashvin Wong MD (02/18 1762)      No acute cardiopulmonary disease  Workstation performed: [id number]         CT abdomen pelvis wo contrast   Final Result by Alfredo Galvin DO (02/17 6146)      Postsurgical changes in the pelvis, status post ileal conduit  Masslike appearance near the small bowel anastomosis and incisional scarring in the right hemipelvis  There is an infiltrative appearance of the surrounding soft tissues    This could be related to scar tissue and fibrosis with adherent bowel loops and associated collapse, although an associated mass (area measures approximately 4 5 x 5 8 x 5 0 cm in size) could be benign (such as a desmoid tumor) or malignant also considered  An abscess in this region is also considered  (Axial image 72, series 2)      Midline incisional scarring with body wall edema and some infiltration of the fat in the anterior pelvic wall which could be edema versus cellulitis depending on the clinical setting  No hydronephrosis  Coronary atherosclerosis, cholelithiasis without discrete evidence of acute cholecystitis, and other findings as above  Workstation performed: [id number]         IR drainage tube check and/or removal    (Results Pending)       Recent Cultures (last 7 days):     Results from last 7 days   Lab Units 02/19/21  1637 02/19/21  1548 02/17/21  2150 02/17/21 2122   BLOOD CULTURE   --   --  No Growth After 4 Days  No Growth After 4 Days     GRAM STAIN RESULT  4+ Gram variable rods*  4+ Disintegrating polys* 3+ Gram positive rods*  Rare Gram negative rods*  4+ Disintegrating polys*  --   --    BODY FLUID CULTURE, STERILE  4+ Growth of Escherichia coli*  Few Colonies of Klebsiella oxytoca*  4+ Growth of Lactobacillus species* 4+ Growth of Lactobacillus species*  Growth in Broth culture only Staphylococcus aureus*  --   --        Last 24 Hours Medication List:   Current Facility-Administered Medications   Medication Dose Route Frequency Provider Last Rate    acetaminophen  650 mg Oral Q6H PRN Aaron Soliz MD      aluminum-magnesium hydroxide-simethicone  30 mL Oral Q6H PRN Aaron Soliz MD      aspirin  81 mg Oral Daily Aaron Soliz MD      atorvastatin  40 mg Oral HS Aaron Soliz MD      bisacodyl  10 mg Rectal Daily PRN Katerin Miller MD      cefepime  1,000 mg Intravenous Q24H Aaron Soliz MD Stopped (02/21/21 2212)    cholecalciferol  2,000 Units Oral Daily Aaron Soliz MD      citalopram  20 mg Oral Daily Richard Ema Ocampo MD      docusate sodium  100 mg Oral BID Carli Bledsoe MD      fish oil  1,000 mg Oral Daily Usama Lopez MD      fluticasone  2 spray Nasal Daily Usama Lopez MD      heparin (porcine)  5,000 Units Subcutaneous [facility] [address] Usama Lopez MD      insulin lispro  1-5 Units Subcutaneous [facility]ID [facility] Us[facility] Lopez MD      insulin lispro  1-5 Units Subcutaneous HS Usama Lopez MD      metoprolol tartrate  50 mg Oral BID Carli Bledsoe MD      metroNIDAZOLE  500 mg Intravenous Q8H Usama Lopez  mg (02/22/21 0959)    multi-electrolyte  75 mL/hr Intravenous Continuous Carli Bledsoe MD 75 mL/hr (02/22/21 0745)    multivitamin-minerals  1 tablet Oral Daily Usama Lopez MD      nystatin   Topical TID Usama Lopez MD      ondansetron  4 mg Intravenous Q6H PRN Usama Lopez MD      pantoprazole  40 mg Oral Early Morning Usama Lopez MD      polyethylene glycol  17 g Oral Daily Carli Bledsoe MD      senna  1 tablet Oral BID Carli Bledsoe MD      sodium chloride  1 spray Each Nare Q1H PRN Carli Bledsoe MD      traMADol  50 mg Oral Q6H PRN Usama Lopez MD      vitamin E (tocopherol)  400 Units Oral Daily Usama Lopez MD          Today, Patient Was Seen By: Carli Bledsoe MD    ** Please Note: Dictation voice to text software may have been used in the creation of this document   **

## 2021-02-22 NOTE — OCCUPATIONAL THERAPY NOTE
Occupational Therapy Treatment Note     02/22/21 0941   OT Last Visit   OT Visit Date 02/22/21   Note Type   Note Type Treatment   Restrictions/Precautions   Weight Bearing Precautions Per Order No   Braces or Orthoses   (none )   Other Precautions Fall Risk;Multiple lines; Chair Alarm   Lifestyle   Autonomy Pt was I with ADLs, required PRN assist with IADLs, and I with functional mobility with use of spc (but admits to falls)  Reciprocal Relationships supportive wife, but recovering from surgery   Service to Others retired   Intrinsic Gratification will continue to assess   Pain Assessment   Pain Assessment Tool 0-10   ADL   Where Assessed Standing at sink   Grooming Assistance 5  Supervision/Setup   Grooming Deficit Standing with assistive device; Increased time to complete;Setup;Steadying   UB Bathing Assistance 5  Supervision/Setup   UB Bathing Deficit Increased time to complete;Setup   UB Bathing Comments seated at bathroom sink    LB Bathing Assistance 3  Moderate Assistance   LB Bathing Deficit Buttocks;Right lower leg including foot; Left lower leg including foot   UB Dressing Assistance 4  Minimal Assistance   UB Dressing Deficit Thread RUE; Thread LUE;Pull around back   LB Dressing Assistance 4  Minimal Assistance   LB Dressing Deficit Don/doff R sock; Don/doff L sock   Bed Mobility   Supine to Sit 3  Moderate assistance   Additional items Assist x 1   Transfers   Sit to Stand 4  Minimal assistance   Additional items Assist x 1   Stand to Sit 4  Minimal assistance   Additional items Assist x 1   Functional Mobility   Functional Mobility 4  Minimal assistance   Additional items Rolling walker   Cognition   Overall Cognitive Status Impaired   Arousal/Participation Alert; Responsive; Cooperative   Attention Attends with cues to redirect   Orientation Level Oriented X4   Memory Decreased recall of recent events;Decreased recall of precautions;Decreased short term memory   Following Commands Follows one step commands with increased time or repetition   Activity Tolerance   Activity Tolerance Patient tolerated treatment well   Assessment   Assessment Pt participated in occupational therapy with focus on activity tolerance,  bed mob,  funcstional transfers/mob, and standing balance and tolerance for pt engagement in functional self-care task/oral care and pt UB and LB self-care  Pt cleared by RN/Rosibel for pt participation in occupational therapy  Pt received HOB raised/supine pt sitting upright and agreeable to therapy following pt Identifiers confirmed  Pt pleasant, cooperative and reported his goal to go home  Pt required assist for bed mob and functional transfers 2* pt decreased strength  Pt able to tolerate standing at bathroom sink for grooming/oral care with assist x 1 to steady pt standing balance  Pt set up at bathroom sink with chair for energy conservation technique following OT instruction  Pt will require post acute rehab services to continue to address these above noted pt deficits which currently impair pt ADL and functional mob  Pt chair alarm active post session all needs within reach      Plan   Treatment Interventions ADL retraining;Functional transfer training   Goal Expiration Date 03/01/21   OT Treatment Day 2   OT Frequency 3-5x/wk   Recommendation   OT Discharge Recommendation Post-Acute Rehabilitation Services   AM-PAC Daily Activity Inpatient   Lower Body Dressing 2   Bathing 2   Toileting 3   Upper Body Dressing 3   Grooming 4   Eating 4   Daily Activity Raw Score 18   Daily Activity Standardized Score (Calc for Raw Score >=11) 38 66   AM-PAC Applied Cognition Inpatient   Following a Speech/Presentation 3   Understanding Ordinary Conversation 3   Taking Medications 3   Remembering Where Things Are Placed or Put Away 3   Remembering List of 4-5 Errands 3   Taking Care of Complicated Tasks 3   Applied Cognition Raw Score 18   Applied Cognition Standardized Score 38 07       Carson Speak SALAZAR/L

## 2021-02-22 NOTE — ASSESSMENT & PLAN NOTE
Baseline creatinine between 1-2  POA with creatinine of 3 59  I suspect this is likely related to volume depletion and sepsis could be contributing along with ARB use  Slowly improving, down to 2 4 today  No hydronephrosis on CT scan  UA shows 0-3 fine and coarse granular cast with some proteinuria and mild hematuria  Continue IV hydration  Hold ARB  Trend BMP for now  If no further improvement, will consider Nephrology evaluation

## 2021-02-22 NOTE — CONSULTS
Consultation - Infectious Disease   Lester Mejia [de-identified] y o  male MRN: [id number]  Unit/Bed#: [facility] 723-01 Encounter: [id number]      IMPRESSION & RECOMMENDATIONS:     Right-sided pelvic abscess  -likely secondary to radical cysto prostatectomy with ileal conduit at [place]  -CT abdomen pelvis- mass noted measuring 4 5 x 5 8 x 5 cm in right hemipelvis  -2/19 IR guided drain placement  -procalcitonin fluctuating at this time  -blood cultures negative at 4 days  -Body fluid cultures growing E coli and Klebsiella oxytoca   -both species pansensitive except ampicillin resistant for Klebsiella  -anaerobic cultures negative  -current antibiotics   -can switch cefepime to cefazolin   -Flagyl day 6   -duration from 2/19 to 2/26; upon discharge could switch to augmentin  -patient will need total of 7 days after undergoing KIKI drain placement    Leukocytosis  -likely secondary to above-noted problem  -WBC 14 35  -continue to monitor at this time    KO  -baseline creatinine between 1 2-1 6  -creatinine continuing to improve  -management per primary team    Have discussed the above management plan in detail with the primary service    Extensive review of the medical records in epic including review of the notes, radiographs, and laboratory results     HISTORY OF PRESENT ILLNESS:  Reason for Consult: antibiotic duration   HPI: Lester Mejia is a [de-identified]y o  year old male past medical history significant for CAD status post CABG, 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, hypertension, diabetes mellitus type 2, hyperlipidemia, GERD, and Wright's esophagus  Patient presented to hospital secondary to ongoing abdominal discomfort and was noted to have intra-abdominal abscess on CT abdomen pelvis  Patient was then evaluated by Interventional Radiology and had right pelvic abscess drain placed on 02/19 and is currently still in place  Given patient's infection he was also started on IV cefepime and Flagyl  Patient also had blood fluid cultures drawn growing E coli and Klebsiella oxytoca  Upon my examination patient was resting comfortably without any acute complaints on exam   Patient states that he has had improvement in his symptoms since being hospitalized and specifically after having drains placed  REVIEW OF SYSTEMS:  A complete review of systems is negative other than that noted in the HPI      PAST MEDICAL HISTORY:  Past Medical History:   Diagnosis Date    [facility] kidney [facility]     Arthritis     Hands    Wright esophagus     BPH with obstruction/lower urinary tract symptoms     CAD (coronary artery disease)     Last assessed 09/16/15    Cancer ([facility]     bladder    Cataract, acquired     Last assessed 10/10/17    Coronary artery disease     Diabetes mellitus ([facility] )     NIDDM    Diabetic neuropathy ([facility] )     Feet    Enlarged prostate with lower urinary tract symptoms (LUTS)     Last assessed 10/10/17    Erectile dysfunction     GERD (gastroesophageal reflux disease)     Last assessed 10/10/17    Hemoptysis     Last assessed 03/14/16    Hypercholesterolemia     Last assessed 10/10/17    Hypertension     Last assessed 10/10/17    Macular degeneration     Right eye is particularly affected    Myocardial infarction ([facility] ) 1998    OAB (overactive bladder)     Testicular hypofunction     Testicular hypogonadism     Last assessed 10/10/17    Tinnitus     Umbilical hernia     Last assessed 06/18/14    Urge incontinence of urine     Wears glasses      Past Surgical History:   Procedure Laterality Date    COLONOSCOPY      CORONARY ARTERY BYPASS GRAFT  07/16/2014    ABG x 4 LIMA to LAD,SVG to diagnoal 2 SVG to OM-1, SVG to PDA, resection of partial plerual mass    CT GUIDED PERC DRAINAGE CATHETER PLACEMENT  2/19/2021    CYSTOSCOPY  2013    ESOPHAGOGASTRODUODENOSCOPY      FL RETROGRADE PYELOGRAM  12/20/2018    FL RETROGRADE PYELOGRAM  12/5/2019    INGUINAL HERNIA REPAIR Bilateral     PHOTODYNAMIC THERAPY      For Barretts esophagus    NJ COLONOSCOPY FLX DX W/COLLJ SPEC WHEN PFRMD N/A 2016    Procedure: COLONOSCOPY;  Surgeon: Eric Lu MD;  Location:  GI LAB; Service: Gastroenterology    NJ CYSTOURETHROSCOPY,BIOPSY N/A 9/10/2020    Procedure: CYSTOSCOPY, COLLECTION OF LEFT KIDNEY CYTOLOGY, BILATERAL RETROGRADE PYELOGRAM, BLADDER WALL BIOPSIES  AND [address], RANDOM BLADDER TUMOR BIOPSIES;  Surgeon: Edris Primrose, MD;  Location: [address] [place] [address] MAIN OR;  Service: Urology    NJ CYSTOURETHROSCOPY,FULGUR 2-5 CM LESN N/A 2020    Procedure: CYSTOSCOPY, TRANSURETHRAL RESECTION OF BLADDER TUMOR (TURBT);   Surgeon: Edris Primrose, MD;  Location[facility];  Service: Urology    NJ CYSTOURETHROSCOPY,FULGUR <0 5 CM LESN N/A 2019    Procedure: CYSTO W/TURBT AND TRANSURETHRAL PROSTATE BIOPSY AND OPENING OF BLADDER NECK CONTRACTURE, B/L Retrograde pyelogram;  Surgeon: Edris Primrose, MD;  [facility];  Service: Urology    TONSILLECTOMY      TRANSURETHRAL RESECTION OF PROSTATE N/A 2018    Procedure: CYSTO, PHOTO SELECTIVE VAPORIZATION OF PROSTATE, B/L RETROGRADE PYELOGRAM, TURBT;  Surgeon: Edris Primrose, MD;  Location: [facility];  Service: Urology    UMBILICAL HERNIA REPAIR  2012    UPPER GASTROINTESTINAL ENDOSCOPY         FAMILY HISTORY:  Non-contributory    SOCIAL HISTORY:  Social History   Social History     Substance and Sexual Activity   Alcohol Use Yes    Alcohol/week: 2 0 standard drinks    Types: 1 Glasses of wine, 1 Cans of beer per week    Drinks per session: 1 or 2    Binge frequency: Daily or almost daily    Comment: 1 drink daily- a mixed drink or wine Last 2020     Social History     Substance and Sexual Activity   Drug Use No     Social History     Tobacco Use   Smoking Status Former Smoker    Packs/day:  00    Years:     Pack years:     Types: Cigarettes    Quit date: 0    Years since quittin 1   Smokeless Tobacco Never Used       ALLERGIES:  Allergies   Allergen Reactions    Fentanyl Hallucinations    Morphine And Related Other (See Comments)     While having an MI patient received morphine and had adverse reaction but doesn't know what happened  MEDICATIONS:  All current active medications have been reviewed  PHYSICAL EXAM:  Temp:  [97 3 °F (36 3 °C)-98 °F (36 7 °C)] 97 3 °F (36 3 °C)  HR:  [54-63] 63  Resp:  [18] 18  BP: (131-145)/(58-69) 141/60  SpO2:  [98 %-99 %] 98 %  Temp (24hrs), Av 6 °F (36 4 °C), Min:97 3 °F (36 3 °C), Max:98 °F (36 7 °C)  Current: Temperature: (!) 97 3 °F (36 3 °C)    Intake/Output Summary (Last 24 hours) at 2021 1139  Last data filed at 2021 0730  Gross per 24 hour   Intake 193 ml   Output 1912 ml   Net -1719 ml       General Appearance:  Appearing well, nontoxic, and in no distress   Head:  Normocephalic, without obvious abnormality, atraumatic   Eyes:  Conjunctiva pink and sclera anicteric, both eyes   Nose: Nares normal, mucosa normal, no drainage   Throat: Oropharynx moist without lesions   Neck: Supple, symmetrical, no adenopathy, no tenderness/mass/nodules   Back:   Symmetric, no curvature, ROM normal, no CVA tenderness   Lungs:   Clear to auscultation bilaterally, respirations unlabored   Chest Wall:  No tenderness or deformity   Heart:  RRR; no murmur, rub or gallop   Abdomen:   Two KIKI drains in place; Soft, non-tender, non-distended, positive bowel sounds    Extremities: No cyanosis, clubbing or edema   Skin: No rashes or lesions  No draining wounds noted  Lymph nodes: Cervical, supraclavicular nodes normal   Neurologic: Alert and oriented times 3, extremity strength 5/5 and symmetric       LABS, IMAGING, & OTHER STUDIES:  Lab Results:  I have personally reviewed pertinent labs    Results from last 7 days   Lab Units 21  0529 21  0536 21  0513   WBC Thousand/uL 14 35* 13 46* 15 26*   HEMOGLOBIN g/dL 9 2* 8 7* 9 2*   PLATELETS Thousands/uL 230 212 197     Results from last 7 days   Lab Units 02/22/21  0529 02/21/21  0536 02/20/21  0513  02/18/21  1126 02/18/21  0449 02/17/21  2107   SODIUM mmol/L 139 138 138   < > 137 135* 132*   POTASSIUM mmol/L 5 0 4 2 4 3   < > 4 4 4 5 4 7   CHLORIDE mmol/L 114* 112* 113*   < > 111* 110* 106   CO2 mmol/L 18* 14* 13*   < > 13* 12* 13*   BUN mg/dL 41* 47* 50*   < > 66* 69* 72*   CREATININE mg/dL 2 40* 2 70* 2 85*   < > 3 33* 3 32* 3 59*   EGFR ml/min/1 73sq m 25 21 20   < > 17 17 15   CALCIUM mg/dL 8 0* 7 5* 7 7*   < > 7 9* 7 7* 8 7   AST U/L  --   --   --   --  18 21 22   ALT U/L  --   --   --   --  19 19 25   ALK PHOS U/L  --   --   --   --  72 72 98    < > = values in this interval not displayed  Results from last 7 days   Lab Units 02/19/21  1637 02/19/21  1548 02/17/21  2150 02/17/21 2122   BLOOD CULTURE   --   --  No Growth After 4 Days  No Growth After 4 Days  GRAM STAIN RESULT  4+ Gram variable rods*  4+ Disintegrating polys* 3+ Gram positive rods*  Rare Gram negative rods*  4+ Disintegrating polys*  --   --    BODY FLUID CULTURE, STERILE  4+ Growth of Escherichia coli*  Few Colonies of Klebsiella oxytoca*  4+ Growth of Lactobacillus species* 4+ Growth of Lactobacillus species*  Growth in Broth culture only Staphylococcus aureus*  --   --      Results from last 7 days   Lab Units 02/22/21  0529 02/21/21  0536 02/20/21  0513 02/19/21  0857 02/18/21  1126 02/18/21  0034   PROCALCITONIN ng/ml 0 55* 0 40* 0 65* 0 76* 0 90* 0 90*                   Imaging Studies:   I have personally reviewed pertinent imaging study reports and images in PACS  Other Studies:   I have personally reviewed pertinent reports

## 2021-02-22 NOTE — ASSESSMENT & PLAN NOTE
Lab Results   Component Value Date    HGBA1C 6 0 (H) 02/18/2021     Hemoglobin A1c; glucose checks per protocol with insulin sliding scale    Recent Labs     02/21/21  1616 02/21/21  2133 02/22/21  0655 02/22/21  1110   POCGLU 177* 180* 107 192*       Blood Sugar Average: Last 72 hrs:  (P) 864 [id number]     iss and accucheks  Hypoglycemia protocol

## 2021-02-22 NOTE — ASSESSMENT & PLAN NOTE
· POA with leukocytosis and tachycardia  · Likely source right pelvic abscess  ·  Known h/o radical cysto prostatectomy with ileal conduit at [facility]  · Blood cultures negative at 4 days  · UA relatively unremarkable for significant infection  · Procalcitonin elevated at 0 9>> 0 76>> 0;4, Leukocytosis trending down as well  · CT abdomen pelvis concerning for anterior abdominal wall cellulitis and/ or abscess in surgical area  · Status post IR guided drainage and drain placement of a right pelvic abscess on 2/19  · Drainage Cultures from IR are growing E coli and Klebsiella so far pending susceptibilities  Gram-positive rods also noted  PLAN:  · Continue IV cefepime Flagyl, day 5   · Follow blood culture results  · Follow culture data from the drainage fluid collection in IR    · ID consult to decide regarding duration of antibiotics     · Trend procalcitonin  · Urology on board, appreciate input  · Trend CBC

## 2021-02-22 NOTE — PROGRESS NOTES
Progress Note -Interventional Radiology RUBI Mcdonald Marionicole [de-identified] y o  male MRN: [id number]  Unit/Bed#: [facility] 723-01 Encounter: [id number]    Assessment:    [de-identified]year old male w/ hx of cystectomy, conduit presenting with pelvic abscess, s/p IR 8Fr and 10Fr drain placement 2/19/21    Drain outputs 24 hours:  8Fr: 33cc serous  10Fr: 20cc purulent drainage    Plan:    - continue drain maintenance  Please flush 8Fr drain 3cc daily, 10Fr drain 5cc BID  - patient will most likely be going to rehab  If patient is to go home, will need [facility] for drain help  - will send flush script to [facility]  Patient to  at time of discharge if going home  - explained care of drain to patient  Instructed him to call IR with any issues of drains  - may do drain check when output less than 10cc per day for two days  - drain growth E coli, Lactobacillus, Klebsiella oxytoca    Patient Active Problem List   Diagnosis    Backache    Benign essential hypertension    DMII (diabetes mellitus, type 2) ([facility]    Hyperlipidemia    Wright's esophagus with esophagitis    Acute kidney injury ([facility]    Overactive bladder    Bladder tumor    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)    Hx of CABG    Malignant neoplasm of overlapping sites of bladder ([facility]    Obesity (BMI 30 0-34  9)    Closed fracture of upper end of right fibula    Medicare annual wellness visit, subsequent    History of bladder cancer    Coronary artery disease involving native coronary artery of native heart without angina pectoris    Ischemic cardiomyopathy    Positive urinary cytology    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)    Liver function study, abnormal    Elevated troponin    Forearm mass, left    Stage 3 chronic kidney disease    Fungal dermatitis    Anxiety and depression    Overweight    Ambulatory dysfunction    Frequent falls    Right sided Pelvic abscess [facility] male [facility])    Severe protein-calorie malnutrition ([facility]    Sepsis ([facility]    Electrolyte disturbance    low HCO3 with normal Anion gap          Subjective: Patient states he feels much better since drains were placed  WBC 14 4 from 18 on Friday when drains were placed  Objective:    Vitals:  /60   Pulse 63   Temp (!) 97 3 °F (36 3 °C)   Resp 18   Ht 5' 8" (1 727 m) Comment: 1/18/21 encounter  Wt 75 5 kg (166 lb 7 2 oz)   SpO2 98%   BMI 25 31 kg/m²   Body mass index is 25 31 kg/m²    Weight (last 2 days)     Date/Time   Weight    02/21/21 0600   75 5 (166 45)    02/20/21 0543   75 7 (166 89)              I/Os:    Intake/Output Summary (Last 24 hours) at 2/22/2021 1046  Last data filed at 2/22/2021 0730  Gross per 24 hour   Intake 193 ml   Output 1912 ml   Net -1719 ml       Invasive Devices     Peripheral Intravenous Line            Peripheral IV 02/19/21 Right Forearm 2 days    Peripheral IV 02/20/21 Proximal;Right;Ventral (anterior) Forearm 2 days          Drain            Urostomy -- days    Closed/Suction Drain Inferior Abdomen Bulb 10 Fr  2 days    Closed/Suction Drain Inferior Abdomen Bulb 8 Fr  2 days                Physical Exam:  General appearance: alert and oriented, in no acute distress  Lungs: clear to auscultation bilaterally  Heart: regular rate and rhythm, S1, S2 normal, no murmur, click, rub or gallop  Abdomen: soft, non-tender; bowel sounds normal; no masses,  no organomegaly  Skin: Drain insertion sites c/d/i, 8Fr drain serous, 10Fr purulent drainage                Lab Results and Cultures:   CBC with diff:   Lab Results   Component Value Date    WBC 14 35 (H) 02/22/2021    HGB 9 2 (L) 02/22/2021    HCT 28 5 (L) 02/22/2021    MCV 96 02/22/2021     02/22/2021    MCH 30 9 02/22/2021    MCHC 32 3 02/22/2021    RDW 14 9 02/22/2021    MPV 11 8 02/22/2021    NRBC 0 02/22/2021      BMP/CMP:  Lab Results   Component Value Date     08/13/2015    K 5 0 02/22/2021 K 4 2 08/13/2015     (H) 02/22/2021     (H) 08/13/2015    CO2 18 (L) 02/22/2021    CO2 23 08/13/2015    ANIONGAP 10 08/13/2015    BUN 41 (H) 02/22/2021    BUN 22 08/13/2015    CREATININE 2 40 (H) 02/22/2021    CREATININE 1 29 08/13/2015    GLUCOSE 128 08/13/2015    CALCIUM 8 0 (L) 02/22/2021    CALCIUM 8 8 08/13/2015    AST 18 02/18/2021    AST 18 08/13/2015    ALT 19 02/18/2021    ALT 25 08/13/2015    ALKPHOS 72 02/18/2021    ALKPHOS 61 08/13/2015    PROT 6 8 08/13/2015    BILITOT 0 58 08/13/2015    EGFR 25 02/22/2021   ,     Coags:   Lab Results   Component Value Date    PTT 26 11/26/2019    PTT 22 (L) 12/21/2014    INR 1 25 (H) 02/19/2021    INR 0 99 12/21/2014   ,   Results from last 7 days   Lab Units 02/19/21  1209   INR  1 25*        Lipid Panel:   Lab Results   Component Value Date    CHOL 140 08/13/2015     Lab Results   Component Value Date    HDL 32 (L) 02/18/2021     Lab Results   Component Value Date    HDL 32 (L) 02/18/2021     Lab Results   Component Value Date    LDLCALC 45 02/18/2021     Lab Results   Component Value Date    TRIG 81 02/18/2021       HgbA1c:   Lab Results   Component Value Date    HGBA1C 6 0 (H) 02/18/2021    HGBA1C 5 9 (H) 01/25/2021    HGBA1C 5 9 (H) 11/26/2020       Blood Culture:   Lab Results   Component Value Date    BLOODCX No Growth After 4 Days   02/17/2021   ,   Urinalysis:   Lab Results   Component Value Date    COLORU Yellow 02/18/2021    COLORU Yellow 10/10/2017    CLARITYU Clear 02/18/2021    CLARITYU Transparent 10/10/2017    SPECGRAV 1 009 02/18/2021    SPECGRAV <=1 005 10/10/2017    PHUR 7 0 02/18/2021    PHUR 5 5 05/24/2018    PHUR 5 5 10/10/2017    LEUKOCYTESUR Large (A) 02/18/2021    LEUKOCYTESUR Negative 10/10/2017    NITRITE Negative 02/18/2021    NITRITE Negative 10/10/2017    PROTEINUA Negative 10/10/2017    GLUCOSEU Negative 02/18/2021    GLUCOSEU Negative 07/14/2014    KETONESU Negative 02/18/2021    KETONESU Negative 10/10/2017    BILIRUBINUR Negative 02/18/2021    BILIRUBINUR Negative 10/10/2017    BLOODU Small (A) 02/18/2021    BLOODU Negative 10/10/2017   ,   Urine Culture:   Lab Results   Component Value Date    URINECX No Growth <1000 cfu/mL 09/01/2020   ,   Wound Culure:  No results found for: WOUNDCULT    VTE Pharmacologic Prophylaxis: Heparin      Thank you for allowing me to participate in the care of Higinio Johnson  Please don't hesitate to call, text, email, or TigerText with any questions  This text is generated with voice recognition software  There may be translation, syntax,  or grammatical errors  If you have any questions, please contact the dictating provider      Dominic Mota PA-C

## 2021-02-22 NOTE — PLAN OF CARE
Problem: OCCUPATIONAL THERAPY ADULT  Goal: Performs self-care activities at highest level of function for planned discharge setting  See evaluation for individualized goals  Description: Treatment Interventions: ADL retraining, Functional transfer training, UE strengthening/ROM, Endurance training, Patient/family training, Equipment evaluation/education, Compensatory technique education, Continued evaluation, Activityengagement          See flowsheet documentation for full assessment, interventions and recommendations  Outcome: Progressing  Note: Limitation: Decreased ADL status, Decreased UE strength, Decreased Safe judgement during ADL, Decreased cognition, Decreased endurance, Decreased self-care trans, Decreased high-level ADLs  Prognosis: Fair  Assessment: Pt participated in occupational therapy with focus on activity tolerance,  bed mob,  funcstional transfers/mob, and standing balance and tolerance for pt engagement in functional self-care task/oral care and pt UB and LB self-care  Pt cleared by RN/Rosibel for pt participation in occupational therapy  Pt received HOB raised/supine pt sitting upright and agreeable to therapy following pt Identifiers confirmed  Pt pleasant, cooperative and reported his goal to go home  Pt required assist for bed mob and functional transfers 2* pt decreased strength  Pt able to tolerate standing at bathroom sink for grooming/oral care with assist x 1 to steady pt standing balance  Pt set up at bathroom sink with chair for energy conservation technique following OT instruction  Pt will require post acute rehab services to continue to address these above noted pt deficits which currently impair pt ADL and functional mob  Pt chair alarm active post session all needs within reach        OT Discharge Recommendation: Post-Acute Rehabilitation Services  OT - OK to Discharge: (to [facility])

## 2021-02-22 NOTE — PROGRESS NOTES
UROLOGY PROGRESS NOTE   Patient Identifiers: Tammy Calvo (MRN [id number])  Date of Service: 2/22/2021    Subjective:    Awake and alert  Feels much better since the abscesses were drained  Afebrile  Creatinine 2 40  WBC 14 35   H&H 9 2&28 5    Patient has  no complaints        Objective:     VITALS:    Vitals:    02/22/21 1002   BP: 141/60   Pulse: 63   Resp:    Temp: (!) 97 3 °F (36 3 °C)   SpO2: 98%           LABS:  Lab Results   Component Value Date    HGB 9 2 (L) 02/22/2021    HCT 28 5 (L) 02/22/2021    WBC 14 35 (H) 02/22/2021     02/22/2021   ]    Lab Results   Component Value Date     08/13/2015    K 5 0 02/22/2021     (H) 02/22/2021    CO2 18 (L) 02/22/2021    BUN 41 (H) 02/22/2021    CREATININE 2 40 (H) 02/22/2021    CALCIUM 8 0 (L) 02/22/2021    GLUCOSE 128 08/13/2015   ]        INPATIENT MEDS:    Current Facility-Administered Medications:     acetaminophen (TYLENOL) tablet 650 mg, 650 mg, Oral, Q6H PRN, Deejay Winkler MD    aluminum-magnesium hydroxide-simethicone (MYLANTA) oral suspension 30 mL, 30 mL, Oral, Q6H PRN, Deejay Winkler MD, 30 mL at 02/21/21 0903    aspirin chewable tablet 81 mg, 81 mg, Oral, Daily, Deejay Winkler MD, 81 mg at 02/22/21 0959    atorvastatin (LIPITOR) tablet 40 mg, 40 mg, Oral, HS, Deejay Winkler MD, 40 mg at 02/21/21 2133    bisacodyl (DULCOLAX) rectal suppository 10 mg, 10 mg, Rectal, Daily PRN, Sekou Martinez MD    cefepime (MAXIPIME) 1,000 mg in dextrose 5 % 50 mL IVPB, 1,000 mg, Intravenous, Q24H, Deejay Winkler MD, Stopped at 02/21/21 2212    cholecalciferol (VITAMIN D3) tablet 2,000 Units, 2,000 Units, Oral, Daily, Deejay Winkler MD, 2,000 Units at 02/22/21 0959    citalopram (CeleXA) tablet 20 mg, 20 mg, Oral, Daily, Deejay Winkler MD, 20 mg at 02/22/21 0959    docusate sodium (COLACE) capsule 100 mg, 100 mg, Oral, BID, Sekou Martinez MD, 100 mg at 02/22/21 0959    fish oil capsule 1,000 mg, 1,000 mg, Oral, Daily, Aaron Soliz MD, 1,000 mg at 02/22/21 0959    fluticasone (FLONASE) 50 mcg/act nasal spray 2 spray, 2 spray, Nasal, Daily, Aaron Soliz MD, 2 spray at 02/22/21 0959    heparin (porcine) subcutaneous injection 5,000 Units, 5,000 Units, Subcutaneous, Q8H [address], Aaron Soliz MD, 5,000 Units at 02/22/21 0533    insulin lispro (HumaLOG) 100 units/mL subcutaneous injection 1-5 Units, 1-5 Units, Subcutaneous, TID AC, 1 Units at 02/21/21 1805 **AND** Fingerstick Glucose (POCT), , , TID AC, Aaron Soliz MD    insulin lispro (HumaLOG) 100 units/mL subcutaneous injection 1-5 Units, 1-5 Units, Subcutaneous, HS, Aaron Soliz MD, 1 Units at 02/21/21 2134    metoprolol tartrate (LOPRESSOR) tablet 50 mg, 50 mg, Oral, BID, Katerin Miller MD, 50 mg at 02/22/21 1000    metroNIDAZOLE (FLAGYL) IVPB (premix) 500 mg 100 mL, 500 mg, Intravenous, Q8H, Aaron Soliz MD, Last Rate: 200 mL/hr at 02/22/21 0959, 500 mg at 02/22/21 0959    multi-electrolyte (PLASMALYTE-A/ISOLYTE-S PH 7 4) IV solution, 75 mL/hr, Intravenous, Continuous, Katerin Miller MD, Last Rate: 75 mL/hr at 02/22/21 0745, 75 mL/hr at 02/22/21 0745    multivitamin-minerals (CENTRUM) tablet 1 tablet, 1 tablet, Oral, Daily, Aaron Soliz MD, 1 tablet at 02/22/21 9538    nystatin (MYCOSTATIN) powder, , Topical, TID, Aaron Soliz MD, Given at 02/22/21 0959    ondansetron (ZOFRAN) injection 4 mg, 4 mg, Intravenous, Q6H PRN, Aaron Soliz MD    pantoprazole (PROTONIX) EC tablet 40 mg, 40 mg, Oral, Early Morning, Aaron Soliz MD, 40 mg at 02/22/21 0533    polyethylene glycol (MIRALAX) packet 17 g, 17 g, Oral, Daily, Katerin Miller MD    [facility]) tablet 8 6 mg, 1 tablet, Oral, BID, Katerin Miller MD    sodium chloride (OCEAN) 0 65 % nasal spray 1 spray, 1 spray, Each Nare, Q1H PRN, Katerin Miller MD    sodium phosphate 12 mmol in sodium chloride 0 9 % 100 mL Infusion, 12 mmol, Intravenous, Once, Katerin Miller MD    traMADol Jeneane Mash) tablet 50 mg, 50 mg, Oral, Q6H PRN, Adan Bone MD, 50 mg at 02/19/21 0854    vitamin E (tocopherol) capsule 400 Units, 400 Units, Oral, Daily, Adan Bone MD, 400 Units at 02/22/21 1000      Physical Exam:   /60   Pulse 63   Temp (!) 97 3 °F (36 3 °C)   Resp 18   Ht 5' 8" (1 727 m) Comment: 1/18/21 encounter  Wt 75 5 kg (166 lb 7 2 oz)   SpO2 98%   BMI 25 31 kg/m²   GEN: no acute distress    RESP: breathing comfortably with no accessory muscle use    ABD: soft, non-tender, non-distended   INCISION:    EXT: no significant peripheral edema     ILEAL CONDUIT: in place draining clear yellow urine  no clots and       RADIOLOGY:   None     Assessment:   1  Pelvic abscess   2  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

## 2021-02-23 ENCOUNTER — TELEPHONE (OUTPATIENT)
Dept: RADIOLOGY | Facility: HOSPITAL | Age: 81
End: 2021-02-23

## 2021-02-23 ENCOUNTER — APPOINTMENT (INPATIENT)
Dept: RADIOLOGY | Facility: HOSPITAL | Age: 81
DRG: 862 | End: 2021-02-23
Payer: MEDICARE

## 2021-02-23 LAB
ANION GAP SERPL CALCULATED.3IONS-SCNC: 9 MMOL/L (ref 4–13)
BACTERIA BLD CULT: NORMAL
BACTERIA BLD CULT: NORMAL
BUN SERPL-MCNC: 33 MG/DL (ref 5–25)
CALCIUM SERPL-MCNC: 8.3 MG/DL (ref 8.3–10.1)
CHLORIDE SERPL-SCNC: 112 MMOL/L (ref 100–108)
CO2 SERPL-SCNC: 18 MMOL/L (ref 21–32)
CREAT SERPL-MCNC: 2.12 MG/DL (ref 0.6–1.3)
ERYTHROCYTE [DISTWIDTH] IN BLOOD BY AUTOMATED COUNT: 15 % (ref 11.6–15.1)
GFR SERPL CREATININE-BSD FRML MDRD: 29 ML/MIN/1.73SQ M
GLUCOSE SERPL-MCNC: 104 MG/DL (ref 65–140)
GLUCOSE SERPL-MCNC: 133 MG/DL (ref 65–140)
GLUCOSE SERPL-MCNC: 158 MG/DL (ref 65–140)
GLUCOSE SERPL-MCNC: 187 MG/DL (ref 65–140)
GLUCOSE SERPL-MCNC: 216 MG/DL (ref 65–140)
HCT VFR BLD AUTO: 28.4 % (ref 36.5–49.3)
HGB BLD-MCNC: 9.3 G/DL (ref 12–17)
MAGNESIUM SERPL-MCNC: 1.8 MG/DL (ref 1.6–2.6)
MCH RBC QN AUTO: 31 PG (ref 26.8–34.3)
MCHC RBC AUTO-ENTMCNC: 32.7 G/DL (ref 31.4–37.4)
MCV RBC AUTO: 95 FL (ref 82–98)
NRBC BLD AUTO-RTO: 0 /100 WBCS
PHOSPHATE SERPL-MCNC: 2.4 MG/DL (ref 2.3–4.1)
PLATELET # BLD AUTO: 235 THOUSANDS/UL (ref 149–390)
PMV BLD AUTO: 11.3 FL (ref 8.9–12.7)
POTASSIUM SERPL-SCNC: 4.5 MMOL/L (ref 3.5–5.3)
RBC # BLD AUTO: 3 MILLION/UL (ref 3.88–5.62)
SODIUM SERPL-SCNC: 139 MMOL/L (ref 136–145)
WBC # BLD AUTO: 13.9 THOUSAND/UL (ref 4.31–10.16)

## 2021-02-23 PROCEDURE — 83735 ASSAY OF MAGNESIUM: CPT | Performed by: INTERNAL MEDICINE

## 2021-02-23 PROCEDURE — 84100 ASSAY OF PHOSPHORUS: CPT | Performed by: INTERNAL MEDICINE

## 2021-02-23 PROCEDURE — 82948 REAGENT STRIP/BLOOD GLUCOSE: CPT

## 2021-02-23 PROCEDURE — 97530 THERAPEUTIC ACTIVITIES: CPT

## 2021-02-23 PROCEDURE — 74176 CT ABD & PELVIS W/O CONTRAST: CPT

## 2021-02-23 PROCEDURE — 99232 SBSQ HOSP IP/OBS MODERATE 35: CPT | Performed by: PHYSICIAN ASSISTANT

## 2021-02-23 PROCEDURE — 97116 GAIT TRAINING THERAPY: CPT

## 2021-02-23 PROCEDURE — 99232 SBSQ HOSP IP/OBS MODERATE 35: CPT | Performed by: INTERNAL MEDICINE

## 2021-02-23 PROCEDURE — 85027 COMPLETE CBC AUTOMATED: CPT | Performed by: INTERNAL MEDICINE

## 2021-02-23 PROCEDURE — 99223 1ST HOSP IP/OBS HIGH 75: CPT | Performed by: INTERNAL MEDICINE

## 2021-02-23 PROCEDURE — 0W2FX0Z CHANGE DRAINAGE DEVICE IN ABDOMINAL WALL, EXTERNAL APPROACH: ICD-10-PCS | Performed by: RADIOLOGY

## 2021-02-23 PROCEDURE — 97112 NEUROMUSCULAR REEDUCATION: CPT

## 2021-02-23 PROCEDURE — G1004 CDSM NDSC: HCPCS

## 2021-02-23 PROCEDURE — 99233 SBSQ HOSP IP/OBS HIGH 50: CPT | Performed by: INTERNAL MEDICINE

## 2021-02-23 PROCEDURE — 80048 BASIC METABOLIC PNL TOTAL CA: CPT | Performed by: INTERNAL MEDICINE

## 2021-02-23 PROCEDURE — NC001 PR NO CHARGE: Performed by: PHYSICIAN ASSISTANT

## 2021-02-23 RX ORDER — ACETYLCYSTEINE 200 MG/ML
1200 SOLUTION ORAL; RESPIRATORY (INHALATION) 2 TIMES DAILY
Status: DISCONTINUED | OUTPATIENT
Start: 2021-02-23 | End: 2021-02-24

## 2021-02-23 RX ORDER — SODIUM BICARBONATE 650 MG/1
650 TABLET ORAL
Status: COMPLETED | OUTPATIENT
Start: 2021-02-23 | End: 2021-02-24

## 2021-02-23 RX ADMIN — Medication 2000 UNITS: at 08:31

## 2021-02-23 RX ADMIN — INSULIN LISPRO 1 UNITS: 100 INJECTION, SOLUTION INTRAVENOUS; SUBCUTANEOUS at 22:22

## 2021-02-23 RX ADMIN — Medication 1 TABLET: at 08:31

## 2021-02-23 RX ADMIN — IOHEXOL 50 ML: 240 INJECTION, SOLUTION INTRATHECAL; INTRAVASCULAR; INTRAVENOUS; ORAL at 18:10

## 2021-02-23 RX ADMIN — ASPIRIN 81 MG: 81 TABLET, CHEWABLE ORAL at 08:31

## 2021-02-23 RX ADMIN — HEPARIN SODIUM 5000 UNITS: 5000 INJECTION INTRAVENOUS; SUBCUTANEOUS at 22:26

## 2021-02-23 RX ADMIN — ATORVASTATIN CALCIUM 40 MG: 20 TABLET, FILM COATED ORAL at 22:21

## 2021-02-23 RX ADMIN — ACETYLCYSTEINE 1200 MG: 200 SOLUTION ORAL; RESPIRATORY (INHALATION) at 19:09

## 2021-02-23 RX ADMIN — SODIUM CHLORIDE 3 G: 9 INJECTION, SOLUTION INTRAVENOUS at 05:15

## 2021-02-23 RX ADMIN — SODIUM CHLORIDE, SODIUM GLUCONATE, SODIUM ACETATE, POTASSIUM CHLORIDE, MAGNESIUM CHLORIDE, SODIUM PHOSPHATE, DIBASIC, AND POTASSIUM PHOSPHATE 75 ML/HR: .53; .5; .37; .037; .03; .012; .00082 INJECTION, SOLUTION INTRAVENOUS at 22:28

## 2021-02-23 RX ADMIN — HEPARIN SODIUM 5000 UNITS: 5000 INJECTION INTRAVENOUS; SUBCUTANEOUS at 05:15

## 2021-02-23 RX ADMIN — SODIUM BICARBONATE 650 MG TABLET 650 MG: at 19:09

## 2021-02-23 RX ADMIN — SODIUM CHLORIDE 3 G: 9 INJECTION, SOLUTION INTRAVENOUS at 19:09

## 2021-02-23 RX ADMIN — HEPARIN SODIUM 5000 UNITS: 5000 INJECTION INTRAVENOUS; SUBCUTANEOUS at 13:43

## 2021-02-23 RX ADMIN — INSULIN LISPRO 2 UNITS: 100 INJECTION, SOLUTION INTRAVENOUS; SUBCUTANEOUS at 16:48

## 2021-02-23 RX ADMIN — Medication 400 UNITS: at 08:34

## 2021-02-23 RX ADMIN — CITALOPRAM HYDROBROMIDE 20 MG: 20 TABLET ORAL at 08:31

## 2021-02-23 RX ADMIN — PANTOPRAZOLE SODIUM 40 MG: 40 TABLET, DELAYED RELEASE ORAL at 05:15

## 2021-02-23 RX ADMIN — METOPROLOL TARTRATE 50 MG: 25 TABLET, FILM COATED ORAL at 08:31

## 2021-02-23 RX ADMIN — SODIUM CHLORIDE, SODIUM GLUCONATE, SODIUM ACETATE, POTASSIUM CHLORIDE, MAGNESIUM CHLORIDE, SODIUM PHOSPHATE, DIBASIC, AND POTASSIUM PHOSPHATE 75 ML/HR: .53; .5; .37; .037; .03; .012; .00082 INJECTION, SOLUTION INTRAVENOUS at 08:39

## 2021-02-23 RX ADMIN — INSULIN LISPRO 1 UNITS: 100 INJECTION, SOLUTION INTRAVENOUS; SUBCUTANEOUS at 08:44

## 2021-02-23 RX ADMIN — OMEGA-3 FATTY ACIDS CAP 1000 MG 1000 MG: 1000 CAP at 08:31

## 2021-02-23 RX ADMIN — METOPROLOL TARTRATE 50 MG: 25 TABLET, FILM COATED ORAL at 19:09

## 2021-02-23 NOTE — PROGRESS NOTES
Progress Note - Infectious Disease   Vickie Elias [de-identified] y o  male MRN: [id number]  Unit/Bed#: [facility] 723-01 Encounter: [id number]      Impression/Plan:  Right-sided pelvic abscess  -likely secondary to radical cysto prostatectomy with ileal conduit at [facility]  -CT abdomen pelvis- mass noted measuring 4 5 x 5 8 x 5 cm in right hemipelvis  - IR guided drain placement  -blood cultures negative at 5 days  -Body fluid cultures growing E coli and Klebsiella oxytoca              -both species pansensitive except ampicillin resistant for Klebsiella   -recommend CT abdomen with contrast when able and possible surgical consultation to further evaluate to see if there is any connection between abscesses and GI tract   -anaerobic cultures negative  -antibiotics changed to Unasyn yesterday and will need to be continued until ; 7 days post drainage  -patient will need total of 7 days after undergoing KIKI drain placement     Leukocytosis  -likely secondary to above-noted problem  -WBC 13 90  -continue to monitor at this time      KO  -baseline creatinine between 1 2- 6  -creatinine continuing to improve  -management per primary team    Bladder cancer  -patient underwent radical cysto prostatectomy with ileal conduit at [facility]  -urology following    Antibiotics:  Unasyn day 2  Antibiotics day 7    Subjective:  Patient has no fever, chills, sweats; no nausea, vomiting, diarrhea; no cough, shortness of breath; no pain  No new symptoms  Objective:  Vitals:  Temp:  [97 3 °F (36 3 °C)-98 2 °F (36 8 °C)] 97 8 °F (36 6 °C)  HR:  [56-91] 86  Resp:  [20-21] 20  BP: (141-162)/(60-81) 162/81  SpO2:  [86 %-99 %] 86 %  Temp (24hrs), Av 8 °F (36 6 °C), Min:97 3 °F (36 3 °C), Max:98 2 °F (36 8 °C)  Current: Temperature: 97 8 °F (36 6 °C)    Physical Exam:   General Appearance:  Alert, interactive, nontoxic, no acute distress  Throat: Oropharynx moist without lesions      Lungs:   Clear to auscultation bilaterally; no wheezes, rhonchi or rales; respirations unlabored   Heart:  RRR; no murmur, rub or gallop   Abdomen:   Soft, non-tender, non-distended, positive bowel sounds  Extremities: No clubbing, cyanosis or edema   Skin: No new rashes or lesions  No draining wounds noted  Labs, Imaging, & Other studies:   All pertinent labs and imaging studies were personally reviewed  Results from last 7 days   Lab Units 02/23/21  0449 02/22/21  0529 02/21/21  0536   WBC Thousand/uL 13 90* 14 35* 13 46*   HEMOGLOBIN g/dL 9 3* 9 2* 8 7*   PLATELETS Thousands/uL 235 230 212     Results from last 7 days   Lab Units 02/23/21  0449 02/22/21  0529 02/21/21  0536  02/18/21  1126 02/18/21 0449 02/17/21  2107   SODIUM mmol/L 139 139 138   < > 137 135* 132*   POTASSIUM mmol/L 4 5 5 0 4 2   < > 4 4 4 5 4 7   CHLORIDE mmol/L 112* 114* 112*   < > 111* 110* 106   CO2 mmol/L 18* 18* 14*   < > 13* 12* 13*   BUN mg/dL 33* 41* 47*   < > 66* 69* 72*   CREATININE mg/dL 2 12* 2 40* 2 70*   < > 3 33* 3 32* 3 59*   EGFR ml/min/1 73sq m 29 25 21   < > 17 17 15   CALCIUM mg/dL 8 3 8 0* 7 5*   < > 7 9* 7 7* 8 7   AST U/L  --   --   --   --  18 21 22   ALT U/L  --   --   --   --  19 19 25   ALK PHOS U/L  --   --   --   --  72 72 98    < > = values in this interval not displayed  Results from last 7 days   Lab Units 02/19/21  1637 02/19/21  1548 02/17/21  2150 02/17/21 2122   BLOOD CULTURE   --   --  No Growth After 5 Days  No Growth After 5 Days     GRAM STAIN RESULT  4+ Gram variable rods*  4+ Disintegrating polys* 3+ Gram positive rods*  Rare Gram negative rods*  4+ Disintegrating polys*  --   --    BODY FLUID CULTURE, STERILE  4+ Growth of Escherichia coli*  Few Colonies of Klebsiella oxytoca*  4+ Growth of Lactobacillus species* 4+ Growth of Lactobacillus species*  Growth in Broth culture only Staphylococcus aureus*  Growth in Broth culture only Escherichia coli*  --   --      Results from last 7 days   Lab Units 02/22/21  0529 02/21/21  0536 02/20/21  0513 02/19/21  0857 02/18/21  1126 02/18/21  0034   PROCALCITONIN ng/ml 0 55* 0 40* 0 65* 0 76* 0 90* 0 90*

## 2021-02-23 NOTE — PLAN OF CARE
Problem: Potential for Falls  Goal: Patient will remain free of falls  Description: INTERVENTIONS:  - Assess patient frequently for physical needs  -  Identify cognitive and physical deficits and behaviors that affect risk of falls    -  [place] fall precautions as indicated by assessment   - Educate patient/family on patient safety including physical limitations  - Instruct patient to call for assistance with activity based on assessment  - Modify environment to reduce risk of injury  - Consider OT/PT consult to assist with strengthening/mobility  Outcome: Progressing     Problem: PAIN - ADULT  Goal: Verbalizes/displays adequate comfort level or baseline comfort level  Description: Interventions:  - Encourage patient to monitor pain and request assistance  - Assess pain using appropriate pain scale  - Administer analgesics based on type and severity of pain and evaluate response  - Implement non-pharmacological measures as appropriate and evaluate response  - Consider cultural and social influences on pain and pain management  - Notify physician/advanced practitioner if interventions unsuccessful or patient reports new pain  Outcome: Progressing     Problem: INFECTION - ADULT  Goal: Absence or prevention of progression during hospitalization  Description: INTERVENTIONS:  - Assess and monitor for signs and symptoms of infection  - Monitor lab/diagnostic results  - Monitor all insertion sites, i e  indwelling lines, tubes, and drains  - Monitor endotracheal if appropriate and nasal secretions for changes in amount and color  - [place] appropriate cooling/warming therapies per order  - Administer medications as ordered  - Instruct and encourage patient and family to use good hand hygiene technique  - Identify and instruct in appropriate isolation precautions for identified infection/condition  Outcome: Progressing     Problem: SAFETY ADULT  Goal: Patient will remain free of falls  Description: INTERVENTIONS:  - Assess patient frequently for physical needs  -  Identify cognitive and physical deficits and behaviors that affect risk of falls    -  [place] fall precautions as indicated by assessment   - Educate patient/family on patient safety including physical limitations  - Instruct patient to call for assistance with activity based on assessment  - Modify environment to reduce risk of injury  - Consider OT/PT consult to assist with strengthening/mobility  Outcome: Progressing  Goal: Maintain or return to baseline ADL function  Description: INTERVENTIONS:  -  Assess patient's ability to carry out ADLs; assess patient's baseline for ADL function and identify physical deficits which impact ability to perform ADLs (bathing, care of mouth/teeth, toileting, grooming, dressing, etc )  - Assess/evaluate cause of self-care deficits   - Assess range of motion  - Assess patient's mobility; develop plan if impaired  - Assess patient's need for assistive devices and provide as appropriate  - Encourage maximum independence but intervene and supervise when necessary  - Involve family in performance of ADLs  - Assess for home care needs following discharge   - Consider OT consult to assist with ADL evaluation and planning for discharge  - Provide patient education as appropriate  Outcome: Progressing  Goal: Maintain or return mobility status to optimal level  Description: INTERVENTIONS:  - Assess patient's baseline mobility status (ambulation, transfers, stairs, etc )    - Identify cognitive and physical deficits and behaviors that affect mobility  - Identify mobility aids required to assist with transfers and/or ambulation (gait belt, sit-to-stand, lift, walker, cane, etc )  - [place] fall precautions as indicated by assessment  - Record patient progress and toleration of activity level on Mobility SBAR; progress patient to next Phase/Stage  - Instruct patient to call for assistance with activity based on assessment  - Consider rehabilitation consult to assist with strengthening/weightbearing, etc   Outcome: Progressing     Problem: DISCHARGE PLANNING  Goal: Discharge to home or other facility with appropriate resources  Description: INTERVENTIONS:  - Identify barriers to discharge w/patient and caregiver  - Arrange for needed discharge resources and transportation as appropriate  - Identify discharge learning needs (meds, wound care, etc )  - Arrange for interpretive services to assist at discharge as needed  - Refer to Case Management Department for coordinating discharge planning if the patient needs post-hospital services based on physician/advanced practitioner order or complex needs related to functional status, cognitive ability, or social support system  Outcome: Progressing     Problem: Knowledge Deficit  Goal: Patient/family/caregiver demonstrates understanding of disease process, treatment plan, medications, and discharge instructions  Description: Complete learning assessment and assess knowledge base  Interventions:  - Provide teaching at level of understanding  - Provide teaching via preferred learning methods  Outcome: Progressing     Problem: Nutrition/Hydration-ADULT  Goal: Nutrient/Hydration intake appropriate for improving, restoring or maintaining nutritional needs  Description: Monitor and assess patient's nutrition/hydration status for malnutrition  Collaborate with interdisciplinary team and initiate plan and interventions as ordered  Monitor patient's weight and dietary intake as ordered or per policy  Utilize nutrition screening tool and intervene as necessary  Determine patient's food preferences and provide high-protein, high-caloric foods as appropriate       INTERVENTIONS:  - Monitor oral intake, urinary output, labs, and treatment plans  - Assess nutrition and hydration status and recommend course of action  - Evaluate amount of meals eaten  - Assist patient with eating if necessary   - Allow adequate time for meals  - Recommend/ encourage appropriate diets, oral nutritional supplements, and vitamin/mineral supplements  - Order, calculate, and assess calorie counts as needed  - Recommend, monitor, and adjust tube feedings and TPN/PPN based on assessed needs  - Assess need for intravenous fluids  - Provide specific nutrition/hydration education as appropriate  - Include patient/family/caregiver in decisions related to nutrition  Outcome: Progressing     Problem: Prexisting or High Potential for Compromised Skin Integrity  Goal: Skin integrity is maintained or improved  Description: INTERVENTIONS:  - Identify patients at risk for skin breakdown  - Assess and monitor skin integrity  - Assess and monitor nutrition and hydration status  - Monitor labs   - Assess for incontinence   - Turn and reposition patient  - Assist with mobility/ambulation  - Relieve pressure over bony prominences  - Avoid friction and shearing  - Provide appropriate hygiene as needed including keeping skin clean and dry  - Evaluate need for skin moisturizer/barrier cream  - Collaborate with interdisciplinary team   - Patient/family teaching  - Consider wound care consult   Outcome: Progressing

## 2021-02-23 NOTE — PLAN OF CARE
Problem: Potential for Falls  Goal: Patient will remain free of falls  Description: INTERVENTIONS:  - Assess patient frequently for physical needs  -  Identify cognitive and physical deficits and behaviors that affect risk of falls    -  [facility] [place] fall precautions as indicated by assessment   - Educate patient/family on patient safety including physical limitations  - Instruct patient to call for assistance with activity based on assessment  - Modify environment to reduce risk of injury  - Consider OT/PT consult to assist with strengthening/mobility  Outcome: Progressing     Problem: PAIN - ADULT  Goal: Verbalizes/displays adequate comfort level or baseline comfort level  Description: Interventions:  - Encourage patient to monitor pain and request assistance  - Assess pain using appropriate pain scale  - Administer analgesics based on type and severity of pain and evaluate response  - Implement non-pharmacological measures as appropriate and evaluate response  - Consider cultural and social influences on pain and pain management  - Notify physician/advanced practitioner if interventions unsuccessful or patient reports new pain  Outcome: Progressing     Problem: INFECTION - ADULT  Goal: Absence or prevention of progression during hospitalization  Description: INTERVENTIONS:  - Assess and monitor for signs and symptoms of infection  - Monitor lab/diagnostic results  - Monitor all insertion sites, i e  indwelling lines, tubes, and drains  - Monitor endotracheal if appropriate and nasal secretions for changes in amount and color  - [facility] [place] appropriate cooling/warming therapies per order  - Administer medications as ordered  - Instruct and encourage patient and family to use good hand hygiene technique  - Identify and instruct in appropriate isolation precautions for identified infection/condition  Outcome: Progressing     Problem: SAFETY ADULT  Goal: Patient will remain free of falls  Description: INTERVENTIONS:  - Assess patient frequently for physical needs  -  Identify cognitive and physical deficits and behaviors that affect risk of falls    -  [place] fall precautions as indicated by assessment   - Educate patient/family on patient safety including physical limitations  - Instruct patient to call for assistance with activity based on assessment  - Modify environment to reduce risk of injury  - Consider OT/PT consult to assist with strengthening/mobility  Outcome: Progressing  Goal: Maintain or return to baseline ADL function  Description: INTERVENTIONS:  -  Assess patient's ability to carry out ADLs; assess patient's baseline for ADL function and identify physical deficits which impact ability to perform ADLs (bathing, care of mouth/teeth, toileting, grooming, dressing, etc )  - Assess/evaluate cause of self-care deficits   - Assess range of motion  - Assess patient's mobility; develop plan if impaired  - Assess patient's need for assistive devices and provide as appropriate  - Encourage maximum independence but intervene and supervise when necessary  - Involve family in performance of ADLs  - Assess for home care needs following discharge   - Consider OT consult to assist with ADL evaluation and planning for discharge  - Provide patient education as appropriate  Outcome: Progressing  Goal: Maintain or return mobility status to optimal level  Description: INTERVENTIONS:  - Assess patient's baseline mobility status (ambulation, transfers, stairs, etc )    - Identify cognitive and physical deficits and behaviors that affect mobility  - Identify mobility aids required to assist with transfers and/or ambulation (gait belt, sit-to-stand, lift, walker, cane, etc )  - [place] fall precautions as indicated by assessment  - Record patient progress and toleration of activity level on Mobility SBAR; progress patient to next Phase/Stage  - Instruct patient to call for assistance with activity based on assessment  - Consider rehabilitation consult to assist with strengthening/weightbearing, etc   Outcome: Progressing     Problem: DISCHARGE PLANNING  Goal: Discharge to home or other facility with appropriate resources  Description: INTERVENTIONS:  - Identify barriers to discharge w/patient and caregiver  - Arrange for needed discharge resources and transportation as appropriate  - Identify discharge learning needs (meds, wound care, etc )  - Arrange for interpretive services to assist at discharge as needed  - Refer to Case Management Department for coordinating discharge planning if the patient needs post-hospital services based on physician/advanced practitioner order or complex needs related to functional status, cognitive ability, or social support system  Outcome: Progressing     Problem: Knowledge Deficit  Goal: Patient/family/caregiver demonstrates understanding of disease process, treatment plan, medications, and discharge instructions  Description: Complete learning assessment and assess knowledge base  Interventions:  - Provide teaching at level of understanding  - Provide teaching via preferred learning methods  Outcome: Progressing     Problem: Nutrition/Hydration-ADULT  Goal: Nutrient/Hydration intake appropriate for improving, restoring or maintaining nutritional needs  Description: Monitor and assess patient's nutrition/hydration status for malnutrition  Collaborate with interdisciplinary team and initiate plan and interventions as ordered  Monitor patient's weight and dietary intake as ordered or per policy  Utilize nutrition screening tool and intervene as necessary  Determine patient's food preferences and provide high-protein, high-caloric foods as appropriate       INTERVENTIONS:  - Monitor oral intake, urinary output, labs, and treatment plans  - Assess nutrition and hydration status and recommend course of action  - Evaluate amount of meals eaten  - Assist patient with eating if necessary   - Allow adequate time for meals  - Recommend/ encourage appropriate diets, oral nutritional supplements, and vitamin/mineral supplements  - Order, calculate, and assess calorie counts as needed  - Recommend, monitor, and adjust tube feedings and TPN/PPN based on assessed needs  - Assess need for intravenous fluids  - Provide specific nutrition/hydration education as appropriate  - Include patient/family/caregiver in decisions related to nutrition  Outcome: Progressing     Problem: Prexisting or High Potential for Compromised Skin Integrity  Goal: Skin integrity is maintained or improved  Description: INTERVENTIONS:  - Identify patients at risk for skin breakdown  - Assess and monitor skin integrity  - Assess and monitor nutrition and hydration status  - Monitor labs   - Assess for incontinence   - Turn and reposition patient  - Assist with mobility/ambulation  - Relieve pressure over bony prominences  - Avoid friction and shearing  - Provide appropriate hygiene as needed including keeping skin clean and dry  - Evaluate need for skin moisturizer/barrier cream  - Collaborate with interdisciplinary team   - Patient/family teaching  - Consider wound care consult   Outcome: Progressing

## 2021-02-23 NOTE — PLAN OF CARE
Problem: PHYSICAL THERAPY ADULT  Goal: Performs mobility at highest level of function for planned discharge setting  See evaluation for individualized goals  Description: Treatment/Interventions: Functional transfer training, LE strengthening/ROM, Elevations, Therapeutic exercise, Endurance training, Cognitive reorientation, Patient/family training, Equipment eval/education, Bed mobility, Gait training, Spoke to nursing, Family  Equipment Recommended: Kyle Yanez       See flowsheet documentation for full assessment, interventions and recommendations  Outcome: Progressing  Note: Prognosis: Good  Problem List: Decreased strength, Decreased range of motion, Decreased endurance, Impaired balance, Decreased mobility, Decreased skin integrity, Pain  Assessment: The pt  has much improved mobility as he is ambulating beyond community distance as well as not requiring any assistance  He does have a reduced santy, but he had no losses of balance even with dynamic head movements  He also demonstrated awareness of his deficits as well as appropriate safety and technique in response to them  Anticipate that he will continue to progress, and to be able to return home at discharge  Barriers to Discharge: Inaccessible home environment     PT Discharge Recommendation: Return to previous environment with social support(HHPT )     PT - OK to Discharge: No    See flowsheet documentation for full assessment

## 2021-02-23 NOTE — ASSESSMENT & PLAN NOTE
Baseline creatinine between 1-2  POA with creatinine of 3 59  I suspect this is likely related to volume depletion and sepsis could be contributing along with ARB use  Slowly improving, down to 2 4 today  No hydronephrosis on CT scan  UA shows 0-3 fine and coarse granular cast with some proteinuria and mild hematuria    Continue IV hydration  Hold ARB  Nephrology on board

## 2021-02-23 NOTE — CONSULTS
Consultation - Nephrology   Consuelo Frias [de-identified] y o  male MRN: [id number]  [facility] Encounter: [id number]    Inpatient consult to Nephrology  Consult performed by: Ambrosio Samuel MD  Consult ordered by: Alka Craft MD          History of Present Illness   Physician Requesting Consult: Alka Craft MD  Reason for Consult / Principal Problem: KO  HPI: Consuelo Frias is a [de-identified]y o  year old male with PMH of CAD s/p CABG, bladder cancer s/p ileal condiut 10/2020, T2DM, HTN, and CKD stage 3 who presented 2/17/20 with 1-2 days of increased abdominal pain and 1 episode of vomiting  He also reported a mass-like structure below his ileal conduit  On presentation to the ED he had an KO with creatinine 3 59, WBC count of 21 36 with HR 96, and elevated troponin 1 15  CT AP showed a mass-like structure near the small bowel anastomosis potentially representing scar tissue/fibrosis, abscess, or tumor  Blood cultures from the ED are negative  He was admitted and started on cefepime and flagyl  Urology evaluated the patient and recommended a loop-o-gram with possible aspiration of the of the mass, which was performed by IR on 2/19/21 with drain placement  Cultures of the aspirate are growing lactobacillus, staph aureus, E  Coli, and Klebsiella  The patient was seen on ID yesterday and they switched his antibiotics to IV unasyn  Patient has remained afebrile and BP has been stable  He did receive contrast through the loop-o-gram via his ileal conduit  Creatinine has been downtrending throughout admission  His ARB was held on admission and he has been receiving fluids  Patient reports he has had a decrease in appetite and he was barely eating for the past couple of weeks prior to hospitalization  Reports weakness and lethargic for a few days prior to presentation  Denies any change in color in the stoma  Reports good UOP at home from ileal conduit   Does endorse one episode where he did not have is bag on and had urine floating in his abdomen beneath his skin  But he drained this himself and has had no other incidents since  Reports 50lb weight loss since June  Reports large improvement in symptoms of fatigue and weakness after drainage on 2/19  History obtained from spouse and child and the patient        Review of systems:  Review of Systems   Constitutional: Positive for unexpected weight change  Negative for chills and fever  Eyes: Negative for visual disturbance  Respiratory: Negative for apnea, cough, chest tightness and shortness of breath  Cardiovascular: Negative for chest pain and palpitations  Gastrointestinal: Negative for diarrhea, nausea and vomiting  Genitourinary: Negative for decreased urine volume, flank pain, frequency and urgency  Skin: Negative for rash  Neurological: Negative for dizziness, weakness and light-headedness       All other systems were reviewed and are negative    Historical Information   Past Medical History:   Diagnosis Date    Acute kidney injury ([address] )     Arthritis     Hands    Wright esophagus     BPH with obstruction/lower urinary tract symptoms     CAD (coronary artery disease)     Last assessed 09/16/15    Cancer ([address] )     bladder    Cataract, acquired     Last assessed 10/10/17    Coronary artery disease     Diabetes mellitus ([address] )     NIDDM    Diabetic neuropathy ([address] )     Feet    Enlarged prostate with lower urinary tract symptoms (LUTS)     Last assessed 10/10/17    Erectile dysfunction     GERD (gastroesophageal reflux disease)     Last assessed 10/10/17    Hemoptysis     Last assessed 03/14/16    Hypercholesterolemia     Last assessed 10/10/17    Hypertension     Last assessed 10/10/17    Macular degeneration     Right eye is particularly affected    Myocardial infarction ([address] ) 1998    OAB (overactive bladder)     Testicular hypofunction     Testicular hypogonadism     Last assessed 10/10/17    Tinnitus     Umbilical hernia     Last assessed 06/18/14    Urge incontinence of urine     Wears glasses      Past Surgical History:   Procedure Laterality Date    COLONOSCOPY      CORONARY ARTERY BYPASS GRAFT  07/16/2014    ABG x 4 LIMA to LAD,SVG to diagnoal 2 SVG to OM-1, SVG to PDA, resection of partial plerual mass    CT GUIDED PERC DRAINAGE CATHETER PLACEMENT  2/19/2021    CYSTOSCOPY  2013    ESOPHAGOGASTRODUODENOSCOPY      FL RETROGRADE PYELOGRAM  12/20/2018    FL RETROGRADE PYELOGRAM  12/5/2019    INGUINAL HERNIA REPAIR Bilateral 2015    PHOTODYNAMIC THERAPY      For Barretts esophagus    TX COLONOSCOPY FLX DX W/COLLJ SPEC WHEN PFRMD N/A 4/25/2016    Procedure: COLONOSCOPY;  Surgeon: Jaye Isbell MD;  Location:  GI LAB; Service: Gastroenterology    TX CYSTOURETHROSCOPY,BIOPSY N/A 9/10/2020    Procedure: CYSTOSCOPY, COLLECTION OF LEFT KIDNEY CYTOLOGY, BILATERAL RETROGRADE PYELOGRAM, BLADDER WALL BIOPSIES  AND Anen Dumont, RANDOM BLADDER TUMOR BIOPSIES;  Surgeon: Ryan Damon MD;  Location: [facility] MAIN OR;  Service: Urology    TX CYSTOURETHROSCOPY,FULGUR 2-5 CM LESN N/A 4/16/2020    Procedure: CYSTOSCOPY, TRANSURETHRAL RESECTION OF BLADDER TUMOR (TURBT);   Surgeon: Ryan Damon MD;  Location[facility];  Service: Urology    TX CYSTOURETHROSCOPY,FULGUR <0 5 CM LESN N/A 12/5/2019    Procedure: CYSTO W/TURBT AND TRANSURETHRAL PROSTATE BIOPSY AND OPENING OF BLADDER NECK CONTRACTURE, B/L Retrograde pyelogram;  Surgeon: Ryan Damon MD;  [facility];  Service: Urology    TONSILLECTOMY      TRANSURETHRAL RESECTION OF PROSTATE N/A 12/20/2018    Procedure: CYSTO, PHOTO SELECTIVE VAPORIZATION OF PROSTATE, B/L RETROGRADE PYELOGRAM, TURBT;  Surgeon: Ryan Damon MD;  Location: [facility];  Service: Urology    UMBILICAL HERNIA REPAIR  2012    UPPER GASTROINTESTINAL ENDOSCOPY       Social History   Social History     Substance and Sexual Activity   Alcohol Use Yes    Alcohol/week: 2 0 standard drinks    Types: 1 Glasses of wine, 1 Cans of beer per week    Drinks per session: 1 or 2    Binge frequency: Daily or almost daily    Comment: 1 drink daily- a mixed drink or wine Last 2020     Social History     Substance and Sexual Activity   Drug Use No     Social History     Tobacco Use   Smoking Status Former Smoker    Packs/day:  00    Years:     Pack years:     Types: Cigarettes    Quit date: 0    Years since quittin 1   Smokeless Tobacco Never Used     Family History   Problem Relation Age of Onset    Cancer Mother     Other Mother         Digestive System Complications    Diabetes Father     Heart disease Father     Hypertension Father     Coronary artery disease Father     Hyperlipidemia Father        Meds/Allergies   all current active meds have been reviewed, current meds:   Current Facility-Administered Medications   Medication Dose Route Frequency    acetaminophen (TYLENOL) tablet 650 mg  650 mg Oral Q6H PRN    aluminum-magnesium hydroxide-simethicone (MYLANTA) oral suspension 30 mL  30 mL Oral Q6H PRN    ampicillin-sulbactam (UNASYN) 3 g in sodium chloride 0 9 % 100 mL IVPB  3 g Intravenous Q12H    aspirin chewable tablet 81 mg  81 mg Oral Daily    atorvastatin (LIPITOR) tablet 40 mg  40 mg Oral HS    bisacodyl (DULCOLAX) rectal suppository 10 mg  10 mg Rectal Daily PRN    cholecalciferol (VITAMIN D3) tablet 2,000 Units  2,000 Units Oral Daily    citalopram (CeleXA) tablet 20 mg  20 mg Oral Daily    docusate sodium (COLACE) capsule 100 mg  100 mg Oral BID    fish oil capsule 1,000 mg  1,000 mg Oral Daily    fluticasone (FLONASE) 50 mcg/act nasal spray 2 spray  2 spray Nasal Daily    heparin (porcine) subcutaneous injection 5,000 Units  5,000 Units Subcutaneous Q8H [facility]    insulin lispro (HumaLOG) 100 units/mL subcutaneous injection 1-5 Units  1-5 Units Subcutaneous TID AC    insulin lispro (HumaLOG) 100 units/mL subcutaneous injection 1-5 Units  1-5 Units Subcutaneous HS    metoprolol tartrate (LOPRESSOR) tablet 50 mg  50 mg Oral BID    multi-electrolyte (PLASMALYTE-A/ISOLYTE-S PH 7 4) IV solution  75 mL/hr Intravenous Continuous    multivitamin-minerals (CENTRUM) tablet 1 tablet  1 tablet Oral Daily    nystatin (MYCOSTATIN) powder   Topical TID    ondansetron (ZOFRAN) injection 4 mg  4 mg Intravenous Q6H PRN    pantoprazole (PROTONIX) EC tablet 40 mg  40 mg Oral Early Morning    polyethylene glycol (MIRALAX) packet 17 g  17 g Oral Daily    senna (SENOKOT) tablet 8 6 mg  1 tablet Oral BID    sodium chloride (OCEAN) 0 65 % nasal spray 1 spray  1 spray Each Nare Q1H PRN    traMADol (ULTRAM) tablet 50 mg  50 mg Oral Q6H PRN    vitamin E (tocopherol) capsule 400 Units  400 Units Oral Daily    and PTA meds:    Medications Prior to Admission   Medication    aspirin 81 mg chewable tablet    atorvastatin (LIPITOR) 40 mg tablet    Cholecalciferol (VITAMIN D) 50 MCG (2000 UT) tablet    citalopram (CeleXA) 20 mg tablet    metFORMIN (GLUCOPHAGE-XR) 500 mg 24 hr tablet    metoprolol tartrate (LOPRESSOR) 50 mg tablet    Multiple Vitamins-Minerals (CENTRUM SILVER 50+MEN PO)    Multiple Vitamins-Minerals (OCUVITE-LUTEIN PO)    omeprazole (PriLOSEC) 40 MG capsule    traMADol (ULTRAM) 50 mg tablet    vitamin E, tocopherol, 400 units capsule    acetaminophen (TYLENOL) 500 mg tablet    Blood Glucose Monitoring Suppl (OneTouch Verio Flex System) w/Device KIT    citalopram (CeleXA) 10 mg tablet    fluticasone (FLONASE) 50 mcg/act nasal spray    irbesartan (AVAPRO) 300 mg tablet    Lancets (OneTouch Delica Plus [id number]) MISC    nystatin (MYCOSTATIN) powder    Omega-3 Fatty Acids (fish oil) 1,000 mg    OneTouch Verio test strip       Allergies   Allergen Reactions    Fentanyl Hallucinations    Morphine And Related Other (See Comments)     While having an MI patient received morphine and had adverse reaction but doesn't know what happened  Objective     Intake/Output Summary (Last 24 hours) at 2/23/2021 1400  Last data filed at 2/23/2021 1215  Gross per 24 hour   Intake 1701 25 ml   Output 2220 ml   Net -518 75 ml     Patient Vitals for the past 24 hrs:   BP Temp Temp src Pulse Resp SpO2   02/23/21 0830 -- 97 8 °F (36 6 °C) -- 86 -- (!) 86 %   02/23/21 0825 162/81 -- -- 91 -- 96 %   02/22/21 2143 150/70 98 2 °F (36 8 °C) -- 64 20 96 %   02/22/21 1809 146/68 -- -- 56 -- 99 %   02/22/21 1504 146/64 97 8 °F (36 6 °C) Oral 68 21 97 %       Invasive Devices:      Vitals:    02/23/21 0830   BP:    Pulse: 86   Resp:    Temp: 97 8 °F (36 6 °C)   SpO2: (!) 86%     General: In no acute distress  Skin: Mild pallor, no acute rashes  Eyes: Non-icteric, PEERL  ENT: Moist mucus membranes  Neck: Supple, no JVD  Chest: CTA b/l  CVS: RRR, no murmurs  Abdomen: Soft, non-tender, bowel sounds present, ostomy site clean/dry/intact, KIKI drains in place draining purulent fluid  Extremities: No edema  Neuro: Strength 5/5 b/l  Psych: A&O x4    Current Weight: Weight - Scale: 75 5 kg (166 lb 7 2 oz)  First Weight: Weight - Scale: 68 kg (150 lb)    Lab Results:  I have personally reviewed pertinent labs    CBC:   Lab Results   Component Value Date    WBC 13 90 (H) 02/23/2021    WBC 8 67 01/14/2015    RBC 3 00 (L) 02/23/2021    RBC 4 61 01/14/2015     CMP:   Lab Results   Component Value Date     08/13/2015     (H) 02/23/2021     (H) 08/13/2015    CO2 18 (L) 02/23/2021    CO2 23 08/13/2015    ANIONGAP 10 08/13/2015    BUN 33 (H) 02/23/2021    BUN 22 08/13/2015    CREATININE 2 12 (H) 02/23/2021    CREATININE 1 29 08/13/2015    GLUCOSE 128 08/13/2015    CALCIUM 8 3 02/23/2021    CALCIUM 8 8 08/13/2015    AST 18 02/18/2021    AST 18 08/13/2015    ALT 19 02/18/2021    ALT 25 08/13/2015    ALKPHOS 72 02/18/2021    ALKPHOS 61 08/13/2015    PROT 6 8 08/13/2015    BILITOT 0 58 08/13/2015    EGFR 29 02/23/2021     Phosphorus:   Lab Results   Component Value Date PHOS 2 4 02/23/2021    PHOS 3 4 12/22/2014     Magnesium:   Lab Results   Component Value Date    MG 1 8 02/23/2021    MG 1 8 12/22/2014     Urinalysis:   Lab Results   Component Value Date    COLORU Yellow 02/18/2021    COLORU Yellow 10/10/2017    CLARITYU Clear 02/18/2021    CLARITYU Transparent 10/10/2017    SPECGRAV 1 009 02/18/2021    SPECGRAV <=1 005 10/10/2017    PHUR 7 0 02/18/2021    PHUR 5 5 05/24/2018    PHUR 5 5 10/10/2017    LEUKOCYTESUR Large (A) 02/18/2021    LEUKOCYTESUR Negative 10/10/2017    NITRITE Negative 02/18/2021    NITRITE Negative 10/10/2017    PROTEINUA Negative 10/10/2017    GLUCOSEU Negative 02/18/2021    GLUCOSEU Negative 07/14/2014    KETONESU Negative 02/18/2021    KETONESU Negative 10/10/2017    BILIRUBINUR Negative 02/18/2021    BILIRUBINUR Negative 10/10/2017    BLOODU Small (A) 02/18/2021    BLOODU Negative 10/10/2017     BMP:   Lab Results   Component Value Date    GLUCOSE 128 08/13/2015    SODIUM 139 02/23/2021    CO2 18 (L) 02/23/2021    CO2 23 08/13/2015    BUN 33 (H) 02/23/2021    BUN 22 08/13/2015    CREATININE 2 12 (H) 02/23/2021    CREATININE 1 29 08/13/2015    CALCIUM 8 3 02/23/2021    CALCIUM 8 8 08/13/2015     ABGs: No results found for: [facility]  Radiology review:   CT guided perc drainage catheter placeme   Final Result by Ania Haddad MD (02/19 2249)   Impression:       CT and ultrasound-guided drain placement x2:   Inferior 8-Filipino Amplatz anchor drain placed into a small linear collection containing thin yellow purulent fluid      Superior 10-Filipino Elder-Zhao drain placed into a air and debris filled cavity in the right abdominal wall with 60 mL of thick viscous pus      Plan:       Flush the 8-Filipino inferior catheter with 3 mL daily   Flush the 10-Filipino superior catheter with 5 mL twice daily   Monitor culture results         Workstation performed: [id number]         XR loopogram   Final Result by Mai Isaacs MD (02/19 7806)      Normal fluoroscopic loopogram demonstrating free reflux of contrast into the ureters bilaterally  No extravasated contrast   No communication of the ileal loop with the anterior pelvic collection seen on the prior CT  Workstation performed: [id number]         XR chest 1 view portable   Final Result by Elisa Johnston MD (02/18 3742)      No acute cardiopulmonary disease  Workstation performed: [id number]         CT abdomen pelvis wo contrast   Final Result by Tino Alaniz DO (02/17 6791)      Postsurgical changes in the pelvis, status post ileal conduit  Masslike appearance near the small bowel anastomosis and incisional scarring in the right hemipelvis  There is an infiltrative appearance of the surrounding soft tissues  This could be related to scar tissue and fibrosis with adherent bowel loops and    associated collapse, although an associated mass (area measures approximately 4 5 x 5 8 x 5 0 cm in size) could be benign (such as a desmoid tumor) or malignant also considered  An abscess in this region is also considered  (Axial image 72, series 2)      Midline incisional scarring with body wall edema and some infiltration of the fat in the anterior pelvic wall which could be edema versus cellulitis depending on the clinical setting  No hydronephrosis  Coronary atherosclerosis, cholelithiasis without discrete evidence of acute cholecystitis, and other findings as above  Workstation performed: [id number]         IR drainage tube check and/or removal    (Results Pending)       EKG, Pathology, and Other Studies: EKG - sinus tach with 1st degree AV block        Assessment/Plan   Maribel Hall is an [de-identified] M with PMH CAD, bladder cancer s/p ileal conduit, T2DM, HTN, CKD stage 3 who is here with abdomino-pelvic abscess  1  KO on CKD stage 3  Cr on presentation was 3 59  Has been improving slightly and is down to 2 12 today  Baseline 1 2-1 6   Has been receiving fluids for suspected prerenal etiology  In setting of sepsis, ATN is a possibility with current BUN:cr ratio  Was also initially oliguric on admission  With relative improvements with fluids suspect prerenal etiology, but may be slow to recover due to ATN  UA with 10-20 WBCs, 1+ protein, and coarse granular casts on 2/18/21  CT did not show hydronephrosis on 2/27/21    - Agree with holding ARB, BP well controlled  - Agree with gentle IV hydration  - Avoid hypotension  - Avoid nephrotoxins if possible  ID requesting contrast CT to evaluate for possible fistula  Okay to proceed with contrast CT  Continue pre and post IV hydration with NAC    - Continue unasyn dosing q12 for renal dosing  2  Blood pressure  Acceptable in 160s/80s    - Continue to hold ARB    3  Electrolytes  Bicarb 18, normal AG, improving slightly  Acidosis potentially related to renal disease  Mg 1 8 and phos 2 4    - VBG to assess acidemia  - Consider sodium bicarb prior to contrast studies  4  Anemia  Hgb in 8-9 range during admission  Baseline closer to 12  Could be dilutional, as patient has received a lot of fluids   - Suspect likely dilutional, but with age, anemia, and worsening renal function, might consider ordering SPEP/UPEP for myeloma work-up     - Consider iron studies

## 2021-02-23 NOTE — ASSESSMENT & PLAN NOTE
· POA with leukocytosis and tachycardia  · Likely source right pelvic abscess  ·  Known h/o radical cysto prostatectomy with ileal conduit at [facility]  · Blood cultures negative at 4 days  · UA relatively unremarkable for significant infection  · Procalcitonin elevated at 0 9>> 0 76>> 0;4, Leukocytosis trending down as well  · CT abdomen pelvis concerning for anterior abdominal wall cellulitis and/ or abscess in surgical area  · Status post IR guided drainage and drain placement of a right pelvic abscess on 2/19  · Drainage Cultures from IR are growing E coli and Klebsiella so far pending susceptibilities  Gram-positive rods also noted  PLAN:  · Continue IV cefepime Flagyl, day 5, changed to Unasyn by ID day 1  · Follow blood culture and urine culture grew different strains   · Concern for fistula, CT abdomen and pelvis without contrast ordered with po only contrast to see if there is any fistula  · Trend procalcitonin  · Urology, ID on board appreciate input  · Will consult Nephrology   · Trend CBC

## 2021-02-23 NOTE — PROGRESS NOTES
Progress Note - Urology  Ck Jerome [de-identified] y o  male MRN: [id number]  Unit/Bed#: [facility] 723-01 Encounter: [id number]    Assessment& Plan:  Bladder cancer status post cystectomy with ileal conduit:  patient diagnosed with high-grade T1 bladder cancer s/p cystectomy with ileal conduit October of 2020 at [facility]   -patient has been having nausea associated with decreased appetite weight loss and right lower quadrant abdominal pain  -CT scan without contrast of abdomen and pelvis shows masslike appearance near small bowel anastomosis and incisional scarring in the right hemipelvis  "There is an infiltrative appearance of the surrounding soft tissue  This could be related to the scar tissue and fibrosis with adherent bowel loops and associated collapse, although in associated mass could be benign or malignant also considered an abscess in this region is also considered  No hydronephrosis"  -IR placed drains, culture pending  -ID following  -leukocytosis 13 creatinine trending downward from 3 to 2 12 today   -continue with medical optimization at this time  IV fluids and IV antibiotics  No further need for  surgical intervention  Will continue to follow periodically however no need to see seen on daily basis  -patient is agreeable to plan           Subjective/Objective   Chief Complaint:  none    Subjective:  Patient is an 27-year-old male known to Dr Tita Edouard with history of high-grade T1 bladder cancer  Status post cystectomy at [facility] by Dr Luc Valdez  Presented to the emergency department with abdominal pain, nausea in white count greater than 20  Imaging showed A new lesion found near his ileoconduit  Status post drainage 2/19, loopogram is negative and there does not appear to be any fistular connection to the abscess cavity for the ileal loop  Drains placed by Interventional Radiology revealed purulence discharge cultures pending  Overall today patient is feeling much better    He does report decreased appetite still from previous encounter  But states this is normal at his baseline  He is able to tolerate a diet at this time and is no longer nauseous or in severe pain  He is up and moving around and able to sit bed  Any issues with his ileal conduit  States that this is draining well with clear yellow urine  Objective:     Blood pressure 162/81, pulse 86, temperature 97 8 °F (36 6 °C), resp  rate 20, height 5' 8" (1 727 m), weight 75 5 kg (166 lb 7 2 oz), SpO2 (!) 86 %  ,Body mass index is 25 31 kg/m²  Intake/Output Summary (Last 24 hours) at 2/23/2021 1412  Last data filed at 2/23/2021 1215  Gross per 24 hour   Intake 1701 25 ml   Output 2220 ml   Net -518 75 ml       Invasive Devices     Peripheral Intravenous Line            Peripheral IV 02/20/21 Proximal;Right;Ventral (anterior) Forearm 3 days    Peripheral IV 02/23/21 Dorsal (posterior); Left Forearm less than 1 day          Drain            Urostomy -- days    Closed/Suction Drain Inferior Abdomen Bulb 10 Fr  3 days    Closed/Suction Drain Inferior Abdomen Bulb 8 Fr  3 days                Physical Exam  Constitutional:       General: He is not in acute distress  Appearance: Normal appearance  He is normal weight  He is not ill-appearing or toxic-appearing  HENT:      Head: Normocephalic and atraumatic  Nose: Nose normal    Eyes:      General: No scleral icterus  Conjunctiva/sclera: Conjunctivae normal    Neck:      Musculoskeletal: Normal range of motion  Cardiovascular:      Rate and Rhythm: Normal rate and regular rhythm  Pulses: Normal pulses  Heart sounds: No murmur  No friction rub  No gallop  Pulmonary:      Effort: Pulmonary effort is normal  No respiratory distress  Breath sounds: No wheezing, rhonchi or rales  Abdominal:      General: Bowel sounds are normal  There is no distension  Palpations: Abdomen is soft  There is no mass  Tenderness: There is no abdominal tenderness   There is no right CVA tenderness, left CVA tenderness, guarding or rebound  Hernia: No hernia is present  Comments: Ileoconduit present draining clear yellow urine, no leakage from the bag  Musculoskeletal: Normal range of motion  Neurological:      General: No focal deficit present  Mental Status: He is alert and oriented to person, place, and time  Psychiatric:         Mood and Affect: Mood normal          Behavior: Behavior normal          Thought Content: Thought content normal          Judgment: Judgment normal        Lab, Imaging and other studies:I have personally reviewed pertinent lab results      Lab Results   Component Value Date    WBC 13 90 (H) 02/23/2021    HGB 9 3 (L) 02/23/2021    HCT 28 4 (L) 02/23/2021    MCV 95 02/23/2021     02/23/2021     Lab Results   Component Value Date    SODIUM 139 02/23/2021    K 4 5 02/23/2021     (H) 02/23/2021    CO2 18 (L) 02/23/2021    BUN 33 (H) 02/23/2021    CREATININE 2 12 (H) 02/23/2021    GLUC 104 02/23/2021    CALCIUM 8 3 02/23/2021       VTE Pharmacologic Prophylaxis: Heparin  VTE Mechanical Prophylaxis: sequential compression device    Herb Izquierdo PA-C

## 2021-02-23 NOTE — PHYSICAL THERAPY NOTE
Physical Therapy Progress Note     02/23/21 0930   PT Last Visit   PT Visit Date 02/23/21   Note Type   Note Type Treatment   Pain Assessment   Pain Assessment Tool 0-10   Pain Score 3   Pain Location/Orientation Location: Abdomen   Hospital Pain Intervention(s) Repositioned; Ambulation/increased activity; Emotional support   Restrictions/Precautions   Other Precautions Fall Risk;Pain   Subjective   Subjective The pt  states that he is feeling better today, and that he is eager to go for a walk  Transfers   Sit to Stand 5  Supervision   Stand to Sit 6  Modified independent   Ambulation/Elevation   Gait pattern Excessively slow   Gait Assistance 5  Supervision   Additional items Verbal cues   Assistive Device Rolling walker   Distance 10 feet, 250 feet  Balance   Static Sitting Normal   Dynamic Sitting Good   Static Standing Fair +   Ambulatory Fair   Activity Tolerance   Activity Tolerance Patient tolerated treatment well   Nurse [address] [place] [address] Way, RN  Exercises   Balance training  Dynamic standing while performing ADLs at the sink  Assessment   Prognosis Good   Problem List Decreased strength;Decreased range of motion;Decreased endurance; Impaired balance;Decreased mobility; Decreased skin integrity;Pain   Assessment The pt  has much improved mobility as he is ambulating beyond community distance as well as not requiring any assistance  He does have a reduced santy, but he had no losses of balance even with dynamic head movements  He also demonstrated awareness of his deficits as well as appropriate safety and technique in response to them  Anticipate that he will continue to progress, and to be able to return home at discharge  Barriers to Discharge Inaccessible home environment   Goals   Patient Goals To regain his independence  STG Expiration Date 03/03/21   PT Treatment Day 1   Plan   Treatment/Interventions Functional transfer training;LE strengthening/ROM; Therapeutic exercise; Endurance training;Patient/family training;Bed mobility;Gait training;Elevations   Progress Progressing toward goals   PT Frequency   (3-5x a week )   Recommendation   PT Discharge Recommendation Return to previous environment with social support  (HHPT )   Equipment Recommended Walker   PT - OK to Discharge No   AM-PAC Basic Mobility Inpatient   Turning in Bed Without Bedrails 4   Lying on Back to Sitting on Edge of Flat Bed 4   Moving Bed to Chair 4   Standing Up From Chair 4   Walk in Room 4   Climb 3-5 Stairs 3   Basic Mobility Inpatient Raw Score 23   Basic Mobility Standardized Score 50 88     Bee Bowman PTA  The patient's AM-PAC Basic Mobility Inpatient Short Form Raw Score is 23, Standardized Score is 50  88  A standardized score greater than 42 9 suggests the patient may benefit from discharge to home  Please also refer to the recommendation of the Physical Therapist for safe discharge planning

## 2021-02-23 NOTE — PROGRESS NOTES
Progress Note - Binh Trevino 1940, [de-identified] y o  male MRN: [id number]    [facility] Encounter: [id number]    Primary Care Provider: Shaina Lopze DO   Date and time admitted to hospital: 2/17/2021  8:52 PM        * Sepsis [facility])  Assessment & Plan  · POA with leukocytosis and tachycardia  · Likely source right pelvic abscess  ·  Known h/o radical cysto prostatectomy with ileal conduit at [facility]  · Blood cultures negative at 4 days  · UA relatively unremarkable for significant infection  · Procalcitonin elevated at 0 9>> 0 76>> 0;4, Leukocytosis trending down as well  · CT abdomen pelvis concerning for anterior abdominal wall cellulitis and/ or abscess in surgical area  · Status post IR guided drainage and drain placement of a right pelvic abscess on 2/19  · Drainage Cultures from IR are growing E coli and Klebsiella so far pending susceptibilities  Gram-positive rods also noted  PLAN:  · Continue IV cefepime Flagyl, day 5, changed to Unasyn by ID day 1  · Follow blood culture and urine culture grew different strains   · Concern for fistula, CT abdomen and pelvis without contrast ordered with po only contrast to see if there is any fistula  · Trend procalcitonin  · Urology, ID on board appreciate input  · Will consult Nephrology   · Trend CBC  low HCO3 with normal Anion gap  Assessment & Plan  Likely related to hyperchloremia, nephrology ordered bicarb  Noted underlying KO  Monitor for now    Electrolyte disturbance  Assessment & Plan  Severe hypomagnesemia and hypophosphatemia likely secondary to poor p o  Intake and malnutrition  Improving    Trend and replete as needed    Severe protein-calorie malnutrition ([address] )  Assessment & Plan  Malnutrition Findings:   Adult Malnutrition type: Chronic illness(related to disease/condition <75% energy intake versus neeeds for > 1 month as evidenced by 42#(21%) wt loss since 5/11/20 Treating with CCD3 diet & Glucerna BID as po supplement )  Adult Degree of Malnutrition: Other severe protein calorie malnutrition    BMI Findings: Body mass index is 25 31 kg/m²  Nutrition consult and supplements  Right sided Pelvic abscess in male [facility])  Assessment & Plan  Now status post IR guided drain placement on 02/19  Continue IV antibiotics and follow cultures  Rest as above  Urology following, appreciate input  Anxiety and depression  Assessment & Plan  Continue Celexa 20 mg daily  Elevated troponin  Assessment & Plan  Likely non MI elevation due to above  No chest discomfort and troponin trending down  History of bladder cancer  Assessment & Plan  radical cysto prostatectomy with ileal conduit at [facility]  Urology on board  Rest As above    Type 2 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 [facility])  Assessment & Plan  Lab Results   Component Value Date    HGBA1C 6 0 (H) 02/18/2021     Hemoglobin A1c; glucose checks per protocol with insulin sliding scale  Recent Labs     02/22/21  1607 02/22/21  2114 02/23/21  0843 02/23/21  1102   POCGLU 199* 175* 158* 133       Blood Sugar Average: Last 72 hrs:  (P) 158     iss and accucheks  Hypoglycemia protocol      Acute kidney injury ([facility] [address] )  Assessment & Plan  Baseline creatinine between 1-2  POA with creatinine of 3 59  I suspect this is likely related to volume depletion and sepsis could be contributing along with ARB use  Slowly improving, down to 2 4 today  No hydronephrosis on CT scan  UA shows 0-3 fine and coarse granular cast with some proteinuria and mild hematuria  Continue IV hydration  Hold ARB  Nephrology on board     Hyperlipidemia  Assessment & Plan  Atorvastatin 40 mg to continue    Benign essential hypertension  Assessment & Plan  Continue metoprolol 50 mg b i d  With holding parameters  ARB on hold due to acute kidney injury           VTE Pharmacologic Prophylaxis:   Pharmacologic: Heparin  Mechanical VTE Prophylaxis in Place: Yes    Patient Centered Rounds: I have performed bedside rounds with nursing staff today  Discussions with Specialists or Other Care Team Provider: nephrology, ID, surgery , IR Education and Discussions with Family / Patient: patient     Time Spent for Care: 45 minutes  More than 50% of total time spent on counseling and coordination of care as described above  Current Length of Stay: 6 day(s)    Current Patient Status: Inpatient   Certification Statement: The patient will continue to require additional inpatient hospital stay due to pelvic abscesses    Discharge Plan: pending CT scan of abd and pelvis without contrast      Code Status: Level 1 - Full Code      Subjective:   Patient is doing well pain wise, still has drains in place  Discussed regarding abscess, need for CT scan and drainage, antibiotics        Objective:     Vitals:   Temp (24hrs), Av 9 °F (36 6 °C), Min:97 8 °F (36 6 °C), Max:98 2 °F (36 8 °C)    Temp:  [97 8 °F (36 6 °C)-98 2 °F (36 8 °C)] 97 8 °F (36 6 °C)  HR:  [56-91] 86  Resp:  [20-21] 20  BP: (146-162)/(64-81) 162/81  SpO2:  [86 %-99 %] 86 %  Body mass index is 25 31 kg/m²  Input and Output Summary (last 24 hours): Intake/Output Summary (Last 24 hours) at 2021 1444  Last data filed at 2021 1215  Gross per 24 hour   Intake 1701 25 ml   Output 2220 ml   Net -518 75 ml       Physical Exam:     Physical Exam  Vitals signs and nursing note reviewed  Constitutional:       General: He is not in acute distress  Appearance: He is not ill-appearing, toxic-appearing or diaphoretic  HENT:      Head: Normocephalic and atraumatic  Nose: No congestion  Eyes:      General:         Right eye: No discharge  Left eye: No discharge  Extraocular Movements: Extraocular movements intact  Conjunctiva/sclera: Conjunctivae normal       Pupils: Pupils are equal, round, and reactive to light     Neck:      Musculoskeletal: Normal range of motion and neck supple  No neck rigidity  Cardiovascular:      Rate and Rhythm: Normal rate  Heart sounds: No murmur  No friction rub  No gallop  Pulmonary:      Effort: No respiratory distress  Breath sounds: No stridor  No wheezing, rhonchi or rales  Chest:      Chest wall: No tenderness  Abdominal:      General: Abdomen is flat  Bowel sounds are normal  There is no distension  Palpations: Abdomen is soft  There is no mass  Tenderness: There is no abdominal tenderness  There is no rebound  Hernia: No hernia is present  Musculoskeletal: Normal range of motion  General: No swelling, tenderness, deformity or signs of injury  Right lower leg: No edema  Lymphadenopathy:      Cervical: No cervical adenopathy  Skin:     General: Skin is warm and dry  Neurological:      General: No focal deficit present  Mental Status: He is alert and oriented to person, place, and time  Mental status is at baseline  Cranial Nerves: No cranial nerve deficit  Sensory: No sensory deficit  Motor: No weakness  Coordination: Coordination normal    Psychiatric:         Mood and Affect: Mood normal          Thought Content: Thought content normal          Additional Data:     Labs:    Results from last 7 days   Lab Units 02/23/21 0449 02/21/21  0536 02/20/21  0513   WBC Thousand/uL 13 90*   < > 13 46* 15 26*   HEMOGLOBIN g/dL 9 3*   < > 8 7* 9 2*   HEMATOCRIT % 28 4*   < > 26 1* 28 2*   PLATELETS Thousands/uL 235   < > 212 197   NEUTROS PCT %  --   --   --  76*   LYMPHS PCT %  --   --   --  8*   LYMPHO PCT %  --   --  2*  --    MONOS PCT %  --   --   --  10   MONO PCT %  --   --  5  --    EOS PCT %  --   --  4 4    < > = values in this interval not displayed       Results from last 7 days   Lab Units 02/23/21 0449 02/18/21  1126   POTASSIUM mmol/L 4 5   < > 4 4   CHLORIDE mmol/L 112*   < > 111*   CO2 mmol/L 18*   < > 13*   BUN mg/dL 33*   < > 66*   CREATININE mg/dL 2 12* < > 3 33*   CALCIUM mg/dL 8 3   < > 7 9*   ALK PHOS U/L  --   --  72   ALT U/L  --   --  19   AST U/L  --   --  18    < > = values in this interval not displayed  Results from last 7 days   Lab Units 02/19/21  1209   INR  1 25*       * I Have Reviewed All Lab Data Listed Above  * Additional Pertinent Lab Tests Reviewed: [address] Admission Reviewed    Imaging:    Imaging Reports Reviewed Today Include:    Imaging Personally Reviewed by Myself Includes     Recent Cultures (last 7 days):     Results from last 7 days   Lab Units 02/19/21  1637 02/19/21  1548 02/17/21  2150 02/17/21  2122   BLOOD CULTURE   --   --  No Growth After 5 Days  No Growth After 5 Days     GRAM STAIN RESULT  4+ Gram variable rods*  4+ Disintegrating polys* 3+ Gram positive rods*  Rare Gram negative rods*  4+ Disintegrating polys*  --   --    BODY FLUID CULTURE, STERILE  4+ Growth of Escherichia coli*  Few Colonies of Klebsiella oxytoca*  4+ Growth of Lactobacillus species* 4+ Growth of Lactobacillus species*  Growth in Broth culture only Staphylococcus aureus*  Growth in Broth culture only Escherichia coli*  --   --        Last 24 Hours Medication List:   Current Facility-Administered Medications   Medication Dose Route Frequency Provider Last Rate    acetaminophen  650 mg Oral Q6H PRN Bea Mark MD      acetylcysteine  1,200 mg Oral BID Jason Batres MD      aluminum-magnesium hydroxide-simethicone  30 mL Oral Q6H PRN Bea Mark MD      ampicillin-sulbactam  3 g Intravenous Q12H Rory MD Allie 3 g (02/23/21 0515)    aspirin  81 mg Oral Daily Bea Mark MD      atorvastatin  40 mg Oral HS Bea Mark MD      bisacodyl  10 mg Rectal Daily PRN Beronica Trevizo MD      cholecalciferol  2,000 Units Oral Daily Bea Mark MD      citalopram  20 mg Oral Daily Bea Mark MD      docusate sodium  100 mg Oral BID Beronica Trevizo MD      fish oil  1,000 mg Oral Daily Richard Leandro Michael MD      fluticasone  2 spray Nasal Daily Adan Bone MD      heparin (porcine)  5,000 Units Subcutaneous [facility] Adan Bone MD      insulin lispro  1-5 Units Subcutaneous TID [facility] Adan Bone MD      insulin lispro  1-5 Units Subcutaneous HS Adan Bone MD      metoprolol tartrate  50 mg Oral BID Sarah Olivo MD      multi-electrolyte  75 mL/hr Intravenous Continuous Sarah Olivo MD 75 mL/hr (02/23/21 0839)   Beverly Cotter multivitamin-minerals  1 tablet Oral Daily Adan Bone MD      nystatin   Topical TID Adan Bone MD      ondansetron  4 mg Intravenous Q6H PRN Adan Bone MD      pantoprazole  40 mg Oral Early Morning Adan Bone MD      polyethylene glycol  17 g Oral Daily Sarah Olivo MD      senna  1 tablet Oral BID Sarah Olivo MD      sodium bicarbonate  650 mg Oral BID after meals Shauna Rosales MD      sodium chloride  1 spray Each Nare Q1H PRN Sarah Olivo MD      traMADol  50 mg Oral Q6H PRN Adan Bone MD      vitamin E (tocopherol)  400 Units Oral Daily Adan Bone MD          Today, Patient Was Seen By: Leena Horowitz MD    ** Please Note: Dictation voice to text software may have been used in the creation of this document   **

## 2021-02-23 NOTE — PLAN OF CARE
Problem: Potential for Falls  Goal: Patient will remain free of falls  Description: INTERVENTIONS:  - Assess patient frequently for physical needs  -  Identify cognitive and physical deficits and behaviors that affect risk of falls    -  [place] fall precautions as indicated by assessment   - Educate patient/family on patient safety including physical limitations  - Instruct patient to call for assistance with activity based on assessment  - Modify environment to reduce risk of injury  - Consider OT/PT consult to assist with strengthening/mobility  Outcome: Progressing     Problem: PAIN - ADULT  Goal: Verbalizes/displays adequate comfort level or baseline comfort level  Description: Interventions:  - Encourage patient to monitor pain and request assistance  - Assess pain using appropriate pain scale  - Administer analgesics based on type and severity of pain and evaluate response  - Implement non-pharmacological measures as appropriate and evaluate response  - Consider cultural and social influences on pain and pain management  - Notify physician/advanced practitioner if interventions unsuccessful or patient reports new pain  Outcome: Progressing     Problem: INFECTION - ADULT  Goal: Absence or prevention of progression during hospitalization  Description: INTERVENTIONS:  - Assess and monitor for signs and symptoms of infection  - Monitor lab/diagnostic results  - Monitor all insertion sites, i e  indwelling lines, tubes, and drains  - Monitor endotracheal if appropriate and nasal secretions for changes in amount and color  - [place] appropriate cooling/warming therapies per order  - Administer medications as ordered  - Instruct and encourage patient and family to use good hand hygiene technique  - Identify and instruct in appropriate isolation precautions for identified infection/condition  Outcome: Progressing     Problem: SAFETY ADULT  Goal: Patient will remain free of falls  Description: INTERVENTIONS:  - Assess patient frequently for physical needs  -  Identify cognitive and physical deficits and behaviors that affect risk of falls    -  [place] fall precautions as indicated by assessment   - Educate patient/family on patient safety including physical limitations  - Instruct patient to call for assistance with activity based on assessment  - Modify environment to reduce risk of injury  - Consider OT/PT consult to assist with strengthening/mobility  Outcome: Progressing  Goal: Maintain or return to baseline ADL function  Description: INTERVENTIONS:  -  Assess patient's ability to carry out ADLs; assess patient's baseline for ADL function and identify physical deficits which impact ability to perform ADLs (bathing, care of mouth/teeth, toileting, grooming, dressing, etc )  - Assess/evaluate cause of self-care deficits   - Assess range of motion  - Assess patient's mobility; develop plan if impaired  - Assess patient's need for assistive devices and provide as appropriate  - Encourage maximum independence but intervene and supervise when necessary  - Involve family in performance of ADLs  - Assess for home care needs following discharge   - Consider OT consult to assist with ADL evaluation and planning for discharge  - Provide patient education as appropriate  Outcome: Progressing  Goal: Maintain or return mobility status to optimal level  Description: INTERVENTIONS:  - Assess patient's baseline mobility status (ambulation, transfers, stairs, etc )    - Identify cognitive and physical deficits and behaviors that affect mobility  - Identify mobility aids required to assist with transfers and/or ambulation (gait belt, sit-to-stand, lift, walker, cane, etc )  - [place] fall precautions as indicated by assessment  - Record patient progress and toleration of activity level on Mobility SBAR; progress patient to next Phase/Stage  - Instruct patient to call for assistance with activity based on assessment  - Consider rehabilitation consult to assist with strengthening/weightbearing, etc   Outcome: Progressing     Problem: DISCHARGE PLANNING  Goal: Discharge to home or other facility with appropriate resources  Description: INTERVENTIONS:  - Identify barriers to discharge w/patient and caregiver  - Arrange for needed discharge resources and transportation as appropriate  - Identify discharge learning needs (meds, wound care, etc )  - Arrange for interpretive services to assist at discharge as needed  - Refer to Case Management Department for coordinating discharge planning if the patient needs post-hospital services based on physician/advanced practitioner order or complex needs related to functional status, cognitive ability, or social support system  Outcome: Progressing     Problem: Knowledge Deficit  Goal: Patient/family/caregiver demonstrates understanding of disease process, treatment plan, medications, and discharge instructions  Description: Complete learning assessment and assess knowledge base  Interventions:  - Provide teaching at level of understanding  - Provide teaching via preferred learning methods  Outcome: Progressing     Problem: Nutrition/Hydration-ADULT  Goal: Nutrient/Hydration intake appropriate for improving, restoring or maintaining nutritional needs  Description: Monitor and assess patient's nutrition/hydration status for malnutrition  Collaborate with interdisciplinary team and initiate plan and interventions as ordered  Monitor patient's weight and dietary intake as ordered or per policy  Utilize nutrition screening tool and intervene as necessary  Determine patient's food preferences and provide high-protein, high-caloric foods as appropriate       INTERVENTIONS:  - Monitor oral intake, urinary output, labs, and treatment plans  - Assess nutrition and hydration status and recommend course of action  - Evaluate amount of meals eaten  - Assist patient with eating if necessary   - Allow adequate time for meals  - Recommend/ encourage appropriate diets, oral nutritional supplements, and vitamin/mineral supplements  - Order, calculate, and assess calorie counts as needed  - Recommend, monitor, and adjust tube feedings and TPN/PPN based on assessed needs  - Assess need for intravenous fluids  - Provide specific nutrition/hydration education as appropriate  - Include patient/family/caregiver in decisions related to nutrition  Outcome: Progressing     Problem: Prexisting or High Potential for Compromised Skin Integrity  Goal: Skin integrity is maintained or improved  Description: INTERVENTIONS:  - Identify patients at risk for skin breakdown  - Assess and monitor skin integrity  - Assess and monitor nutrition and hydration status  - Monitor labs   - Assess for incontinence   - Turn and reposition patient  - Assist with mobility/ambulation  - Relieve pressure over bony prominences  - Avoid friction and shearing  - Provide appropriate hygiene as needed including keeping skin clean and dry  - Evaluate need for skin moisturizer/barrier cream  - Collaborate with interdisciplinary team   - Patient/family teaching  - Consider wound care consult   Outcome: Progressing

## 2021-02-23 NOTE — TELEPHONE ENCOUNTER
Called to give nurse drinking instructions for stat ct abd/pel order  Line was busy  Oral contrast orders placed in epic  Oral contrast was mixed and delivered to nurse by Chato Fischer   Nurse given instructions to call when pt finishes drinking

## 2021-02-23 NOTE — ASSESSMENT & PLAN NOTE
Lab Results   Component Value Date    HGBA1C 6 0 (H) 02/18/2021     Hemoglobin A1c; glucose checks per protocol with insulin sliding scale    Recent Labs     02/22/21  1607 02/22/21  2114 02/23/21  0843 02/23/21  1102   POCGLU 199* 175* 158* 133       Blood Sugar Average: Last 72 hrs:  (P) 158     iss and accucheks  Hypoglycemia protocol

## 2021-02-23 NOTE — TELEMEDICINE
INTERPROFESSIONAL (PHONE) CONSULTATION - Interventional Radiology  Vickie Elias [de-identified] y o  male MRN: [id number]  Unit/Bed#: [facility] 723-01 Encounter: [id number]    IR has been consulted to evaluate the patient, determine the appropriate procedure, and whether or not a procedure can and should be performed regarding the care of Vickie Elias  We were consulted by internal medicine concerning concern for communication between collections and bowel, and to possibly perform a drain check if medically appropriate for the patient  IP Consult to IR  Consult performed by: Nilda Huynh PA-C  Consult ordered by: Ronnie Cadena MD        02/23/21      Assessment/Recommendation:     [de-identified]year old male w/ hx of cystectomy, conduit presenting with pelvic abscess, s/p IR 8Fr and 10Fr drain placement 2/19/21    - concern for collection communication with bowel due to cultures  - will plan for non urgent drain check within the next few days  - f/u CT abdomen/pelvis with PO, without IV contrast  - Dr Raz Oneal discussed with Dr Purvi Pena      Total time spent in review of data, discussion with requesting provider and rendering advice was 25 minutes       Patient or appropriate family member was verbally informed by internal medicine of this consultative service on their behalf to provide more timely access to specialty care in lieu of an in person consultation  Verbal consent was obtained  Thank you for allowing Interventional Radiology to participate in the care of Vickie Elias  Please don't hesitate to call or TigerText us with any questions       Nilda Huynh PA-C

## 2021-02-24 ENCOUNTER — APPOINTMENT (INPATIENT)
Dept: RADIOLOGY | Facility: HOSPITAL | Age: 81
DRG: 862 | End: 2021-02-24
Payer: MEDICARE

## 2021-02-24 LAB
ANION GAP SERPL CALCULATED.3IONS-SCNC: 9 MMOL/L (ref 4–13)
BACTERIA SPEC BFLD CULT: ABNORMAL
BUN SERPL-MCNC: 26 MG/DL (ref 5–25)
CALCIUM SERPL-MCNC: 8.3 MG/DL (ref 8.3–10.1)
CHLORIDE SERPL-SCNC: 115 MMOL/L (ref 100–108)
CO2 SERPL-SCNC: 19 MMOL/L (ref 21–32)
CREAT SERPL-MCNC: 1.94 MG/DL (ref 0.6–1.3)
ERYTHROCYTE [DISTWIDTH] IN BLOOD BY AUTOMATED COUNT: 14.8 % (ref 11.6–15.1)
GFR SERPL CREATININE-BSD FRML MDRD: 32 ML/MIN/1.73SQ M
GLUCOSE SERPL-MCNC: 101 MG/DL (ref 65–140)
GLUCOSE SERPL-MCNC: 142 MG/DL (ref 65–140)
GLUCOSE SERPL-MCNC: 180 MG/DL (ref 65–140)
GLUCOSE SERPL-MCNC: 201 MG/DL (ref 65–140)
GLUCOSE SERPL-MCNC: 98 MG/DL (ref 65–140)
GRAM STN SPEC: ABNORMAL
HCT VFR BLD AUTO: 27.1 % (ref 36.5–49.3)
HGB BLD-MCNC: 8.7 G/DL (ref 12–17)
MCH RBC QN AUTO: 31.2 PG (ref 26.8–34.3)
MCHC RBC AUTO-ENTMCNC: 32.1 G/DL (ref 31.4–37.4)
MCV RBC AUTO: 97 FL (ref 82–98)
NRBC BLD AUTO-RTO: 0 /100 WBCS
PLATELET # BLD AUTO: 221 THOUSANDS/UL (ref 149–390)
PMV BLD AUTO: 11 FL (ref 8.9–12.7)
POTASSIUM SERPL-SCNC: 4.9 MMOL/L (ref 3.5–5.3)
RBC # BLD AUTO: 2.79 MILLION/UL (ref 3.88–5.62)
SODIUM SERPL-SCNC: 143 MMOL/L (ref 136–145)
WBC # BLD AUTO: 16.17 THOUSAND/UL (ref 4.31–10.16)

## 2021-02-24 PROCEDURE — 82948 REAGENT STRIP/BLOOD GLUCOSE: CPT

## 2021-02-24 PROCEDURE — 85027 COMPLETE CBC AUTOMATED: CPT | Performed by: INTERNAL MEDICINE

## 2021-02-24 PROCEDURE — 75984 XRAY CONTROL CATHETER CHANGE: CPT | Performed by: RADIOLOGY

## 2021-02-24 PROCEDURE — 75984 XRAY CONTROL CATHETER CHANGE: CPT

## 2021-02-24 PROCEDURE — 99233 SBSQ HOSP IP/OBS HIGH 50: CPT | Performed by: INTERNAL MEDICINE

## 2021-02-24 PROCEDURE — 99232 SBSQ HOSP IP/OBS MODERATE 35: CPT | Performed by: INTERNAL MEDICINE

## 2021-02-24 PROCEDURE — C1729 CATH, DRAINAGE: HCPCS

## 2021-02-24 PROCEDURE — C1769 GUIDE WIRE: HCPCS

## 2021-02-24 PROCEDURE — 49423 EXCHANGE DRAINAGE CATHETER: CPT | Performed by: RADIOLOGY

## 2021-02-24 PROCEDURE — 80048 BASIC METABOLIC PNL TOTAL CA: CPT | Performed by: INTERNAL MEDICINE

## 2021-02-24 RX ADMIN — INSULIN LISPRO 1 UNITS: 100 INJECTION, SOLUTION INTRAVENOUS; SUBCUTANEOUS at 21:38

## 2021-02-24 RX ADMIN — NYSTATIN: 100000 POWDER TOPICAL at 16:44

## 2021-02-24 RX ADMIN — SODIUM BICARBONATE 650 MG TABLET 650 MG: at 08:59

## 2021-02-24 RX ADMIN — METOPROLOL TARTRATE 50 MG: 25 TABLET, FILM COATED ORAL at 08:56

## 2021-02-24 RX ADMIN — INSULIN LISPRO 1 UNITS: 100 INJECTION, SOLUTION INTRAVENOUS; SUBCUTANEOUS at 16:44

## 2021-02-24 RX ADMIN — HEPARIN SODIUM 5000 UNITS: 5000 INJECTION INTRAVENOUS; SUBCUTANEOUS at 13:39

## 2021-02-24 RX ADMIN — DOCUSATE SODIUM 100 MG: 100 CAPSULE, LIQUID FILLED ORAL at 17:22

## 2021-02-24 RX ADMIN — HEPARIN SODIUM 5000 UNITS: 5000 INJECTION INTRAVENOUS; SUBCUTANEOUS at 21:36

## 2021-02-24 RX ADMIN — ATORVASTATIN CALCIUM 40 MG: 20 TABLET, FILM COATED ORAL at 21:36

## 2021-02-24 RX ADMIN — HEPARIN SODIUM 5000 UNITS: 5000 INJECTION INTRAVENOUS; SUBCUTANEOUS at 06:27

## 2021-02-24 RX ADMIN — METOPROLOL TARTRATE 50 MG: 25 TABLET, FILM COATED ORAL at 17:22

## 2021-02-24 RX ADMIN — Medication 400 UNITS: at 08:55

## 2021-02-24 RX ADMIN — PANTOPRAZOLE SODIUM 40 MG: 40 TABLET, DELAYED RELEASE ORAL at 06:27

## 2021-02-24 RX ADMIN — SODIUM CHLORIDE 3 G: 9 INJECTION, SOLUTION INTRAVENOUS at 06:27

## 2021-02-24 RX ADMIN — CITALOPRAM HYDROBROMIDE 20 MG: 20 TABLET ORAL at 08:55

## 2021-02-24 RX ADMIN — Medication 2000 UNITS: at 08:55

## 2021-02-24 RX ADMIN — SENNOSIDES 8.6 MG: 8.6 TABLET, FILM COATED ORAL at 17:22

## 2021-02-24 RX ADMIN — AMPICILLIN SODIUM AND SULBACTAM SODIUM 3 G: 2; 1 INJECTION, POWDER, FOR SOLUTION INTRAMUSCULAR; INTRAVENOUS at 17:44

## 2021-02-24 RX ADMIN — Medication 1 TABLET: at 08:56

## 2021-02-24 RX ADMIN — ASPIRIN 81 MG: 81 TABLET, CHEWABLE ORAL at 08:56

## 2021-02-24 RX ADMIN — IOHEXOL 15 ML: 350 INJECTION, SOLUTION INTRAVENOUS at 09:58

## 2021-02-24 NOTE — ASSESSMENT & PLAN NOTE
radical cysto prostatectomy with ileal conduit at [place] [id number]  Urology on board  Rest As above

## 2021-02-24 NOTE — ASSESSMENT & PLAN NOTE
Malnutrition Findings:   Adult Malnutrition type: Chronic illness(related to disease/condition <75% energy intake versus neeeds for > 1 month as evidenced by 42#(21%) wt loss since 5/11/20 Treating with CCD3 diet & Glucerna BID as po supplement )  Adult Degree of Malnutrition: Other severe protein calorie malnutrition    BMI Findings: Body mass index is 27 05 kg/m²  Nutrition consult and supplements

## 2021-02-24 NOTE — PROGRESS NOTES
Progress Note - Nathan Speed 1940, [de-identified] y o  male MRN: [id number]    Unit/Bed#: [facility] 723-01 Encounter: [id number]    Primary Care Provider: Keyur Tong DO   Date and time admitted to hospital: 2/17/2021  8:52 PM        * Sepsis [facility])  Assessment & Plan  · POA with leukocytosis and tachycardia  · Likely source right pelvic abscess  ·  Known h/o radical cysto prostatectomy with ileal conduit at [facility]  · Blood cultures negative at 4 days  · UA relatively unremarkable for significant infection  · Procalcitonin elevated at 0 9>> 0 76>> 0;4, Leukocytosis trending down as well  · CT abdomen pelvis concerning for anterior abdominal wall cellulitis and/ or abscess in surgical area  · Status post IR guided drainage and drain placement of a right pelvic abscess on 2/19  · Drainage Cultures from IR are growing E coli and Klebsiella so far pending susceptibilities  Gram-positive rods also noted  PLAN:  · stopped IV cefepime Flagyl, day 5, changed to Unasyn by ID day 2  · Follow blood culture and urine culture grew different strains   · Concern for fistula, CT abdomen and pelvis without contrast ordered with po only contrast to see if there is any fistula  · procalcitonin neg  · Urology, nephrology, ID on board appreciate input  · Trend CBC  · Went to IR today for drain adjustment, CT scan from 2/23 did not show leak of fluid from GI tract, so, less likely fistula  No need for contrast CT for now  · Renally improved  low HCO3 with normal Anion gap  Assessment & Plan  Likely related to hyperchloremia, nephrology ordered bicarb  Noted underlying KO  Monitor for now    Electrolyte disturbance  Assessment & Plan  Severe hypomagnesemia and hypophosphatemia likely secondary to poor p o  Intake and malnutrition  Improving    Trend and replete as needed    Severe protein-calorie malnutrition ([address] )  Assessment & Plan  Malnutrition Findings:   Adult Malnutrition type: Chronic illness(related to disease/condition <75% energy intake versus neeeds for > 1 month as evidenced by 42#(21%) wt loss since 5/11/20 Treating with CCD3 diet & Glucerna BID as po supplement )  Adult Degree of Malnutrition: Other severe protein calorie malnutrition    BMI Findings: Body mass index is 27 05 kg/m²  Nutrition consult and supplements  Right sided Pelvic abscess in male [facility])  Assessment & Plan  Now status post IR guided drain placement on 02/19  Continue IV antibiotics and follow cultures  Rest as above  Urology following, appreciate input  Anxiety and depression  Assessment & Plan  Continue Celexa 20 mg daily  Elevated troponin  Assessment & Plan  Likely non MI elevation due to above  No chest discomfort and troponin trending down  History of bladder cancer  Assessment & Plan  radical cysto prostatectomy with ileal conduit at [facility]  Urology on board  Rest As above    Type 2 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 [facility])  Assessment & Plan  Lab Results   Component Value Date    HGBA1C 6 0 (H) 02/18/2021     Hemoglobin A1c; glucose checks per protocol with insulin sliding scale  Recent Labs     02/23/21  2221 02/24/21  0634 02/24/21  1200 02/24/21  1609   POCGLU 187* 98 142* 180*       Blood Sugar Average: Last 72 hrs:  (P) 312 [id number]     iss and accucheks  Hypoglycemia protocol      Acute kidney injury ([facility] [address] )  Assessment & Plan  Baseline creatinine between 1-2  POA with creatinine of 3 59  I suspect this is likely related to volume depletion and sepsis could be contributing along with ARB use  Slowly improving, down to 2 4 today  No hydronephrosis on CT scan  UA shows 0-3 fine and coarse granular cast with some proteinuria and mild hematuria    Continue IV hydration  Hold ARB  Nephrology on board     Hyperlipidemia  Assessment & Plan  Atorvastatin 40 mg to continue    Benign essential hypertension  Assessment & Plan  Continue metoprolol 50 mg b i d  With holding parameters  ARB on hold due to acute kidney injury  VTE Pharmacologic Prophylaxis:   Pharmacologic: Heparin  Mechanical VTE Prophylaxis in Place: Yes    Patient Centered Rounds: I have performed bedside rounds with nursing staff today  Discussions with Specialists or Other Care Team Provider: medicine     Education and Discussions with Family / Patient: patient     Time Spent for Care: 45 minutes  More than 50% of total time spent on counseling and coordination of care as described above  Current Length of Stay: 7 day(s)    Current Patient Status: Inpatient   Certification Statement: The patient will continue to require additional inpatient hospital stay due to abscess drainage  Discharge Plan: pending recommendations from ID regarding duration of antibiotics/home set up, still draining  Code Status: Level 1 - Full Code      Subjective:   Patient is doing well today  More drainage from the wound noted  Objective:     Vitals:   Temp (24hrs), Av 1 °F (36 7 °C), Min:97 4 °F (36 3 °C), Max:98 6 °F (37 °C)    Temp:  [97 4 °F (36 3 °C)-98 6 °F (37 °C)] 98 2 °F (36 8 °C)  HR:  [58-72] 72  Resp:  [15-18] 18  BP: (136-165)/(73-80) 136/76  SpO2:  [97 %-98 %] 98 %  Body mass index is 27 05 kg/m²  Input and Output Summary (last 24 hours): Intake/Output Summary (Last 24 hours) at 2021 1642  Last data filed at 2021 1100  Gross per 24 hour   Intake 807 5 ml   Output 3040 ml   Net -2232 5 ml       Physical Exam:     Physical Exam  Vitals signs and nursing note reviewed  Constitutional:       General: He is not in acute distress  Appearance: Normal appearance  He is obese  He is not ill-appearing or toxic-appearing  HENT:      Head: Normocephalic and atraumatic  Eyes:      General: No scleral icterus  Right eye: No discharge  Left eye: No discharge  Extraocular Movements: Extraocular movements intact  Conjunctiva/sclera: Conjunctivae normal       Pupils: Pupils are equal, round, and reactive to light  Neck:      Musculoskeletal: Normal range of motion and neck supple  No neck rigidity or muscular tenderness  Vascular: No carotid bruit  Cardiovascular:      Rate and Rhythm: Normal rate  Pulmonary:      Effort: Pulmonary effort is normal    Abdominal:      General: Abdomen is flat  There is distension  Palpations: Abdomen is soft  Tenderness: There is no abdominal tenderness  Musculoskeletal: Normal range of motion  General: No swelling, tenderness, deformity or signs of injury  Right lower leg: No edema  Left lower leg: No edema  Lymphadenopathy:      Cervical: No cervical adenopathy  Skin:     General: Skin is warm and dry  Coloration: Skin is not jaundiced or pale  Findings: No bruising, erythema, lesion or rash  Neurological:      General: No focal deficit present  Mental Status: He is alert  Mental status is at baseline  He is disoriented  Cranial Nerves: No cranial nerve deficit  Sensory: No sensory deficit  Coordination: Coordination normal    Psychiatric:         Mood and Affect: Mood normal          Behavior: Behavior normal          Thought Content: Thought content normal          Judgment: Judgment normal          Additional Data:     Labs:    Results from last 7 days   Lab Units 02/24/21  0530  02/21/21  0536 02/20/21  0513   WBC Thousand/uL 16 17*   < > 13 46* 15 26*   HEMOGLOBIN g/dL 8 7*   < > 8 7* 9 2*   HEMATOCRIT % 27 1*   < > 26 1* 28 2*   PLATELETS Thousands/uL 221   < > 212 197   NEUTROS PCT %  --   --   --  76*   LYMPHS PCT %  --   --   --  8*   LYMPHO PCT %  --   --  2*  --    MONOS PCT %  --   --   --  10   MONO PCT %  --   --  5  --    EOS PCT %  --   --  4 4    < > = values in this interval not displayed       Results from last 7 days   Lab Units 02/24/21  0530  02/18/21  1126   POTASSIUM mmol/L 4 9   < > 4 4 CHLORIDE mmol/L 115*   < > 111*   CO2 mmol/L 19*   < > 13*   BUN mg/dL 26*   < > 66*   CREATININE mg/dL 1 94*   < > 3 33*   CALCIUM mg/dL 8 3   < > 7 9*   ALK PHOS U/L  --   --  72   ALT U/L  --   --  19   AST U/L  --   --  18    < > = values in this interval not displayed  Results from last 7 days   Lab Units 02/19/21  1209   INR  1 25*       * I Have Reviewed All Lab Data Listed Above  * Additional Pertinent Lab Tests Reviewed: [address] Admission Reviewed    Imaging:    Imaging Reports Reviewed Today Include:    Imaging Personally Reviewed by Myself Includes:  *     Recent Cultures (last 7 days):     Results from last 7 days   Lab Units 02/19/21  1637 02/19/21  1548 02/17/21  2150 02/17/21 2122   BLOOD CULTURE   --   --  No Growth After 5 Days  No Growth After 5 Days     GRAM STAIN RESULT  4+ Gram variable rods*  4+ Disintegrating polys* 3+ Gram positive rods*  Rare Gram negative rods*  4+ Disintegrating polys*  --   --    BODY FLUID CULTURE, STERILE  4+ Growth of Escherichia coli*  Few Colonies of Klebsiella oxytoca*  4+ Growth of Lactobacillus species* 4+ Growth of Lactobacillus species*  Growth in Broth culture only Staphylococcus aureus*  Growth in Broth culture only Escherichia coli*  --   --        Last 24 Hours Medication List:   Current Facility-Administered Medications   Medication Dose Route Frequency Provider Last Rate    acetaminophen  650 mg Oral Q6H PRN Seema Aquino MD      aluminum-magnesium hydroxide-simethicone  30 mL Oral Q6H PRN Seema Aquino MD      ampicillin-sulbactam  3 g Intravenous Q12H Rorydemario Kelley MD 3 g (02/24/21 0627)    aspirin  81 mg Oral Daily Seema Aquino MD      atorvastatin  40 mg Oral HS Seema Aquino MD      bisacodyl  10 mg Rectal Daily PRN Frances East MD      cholecalciferol  2,000 Units Oral Daily Seema Aquino MD      citalopram  20 mg Oral Daily Seema Aquino MD      docusate sodium  100 mg Oral BID Penny Woody MD      fish oil  1,000 mg Oral Daily Osbaldo Meehan MD      fluticasone  2 spray Nasal Daily Osbaldo Meehan MD      heparin (porcine)  5,000 Units Subcutaneous On license of [facility] Osbaldo Meehan MD      insulin lispro  1-5 Units Subcutaneous TID [facility] Osbaldo Meehan MD      insulin lispro  1-5 Units Subcutaneous HS Osbaldo Meehan MD      metoprolol tartrate  50 mg Oral BID Penny Woody MD      multivitamin-minerals  1 tablet Oral Daily Osbaldo Meehan MD      nystatin   Topical TID Osbaldo Meehan MD      ondansetron  4 mg Intravenous Q6H PRN Osbaldo Meehan MD      pantoprazole  40 mg Oral Early Morning Osbaldo Meehan MD      polyethylene glycol  17 g Oral Daily Penny Woody MD      senna  1 tablet Oral BID Penny Woody MD      sodium chloride  1 spray Each Nare Q1H PRN Penny Woody MD      traMADol  50 mg Oral Q6H PRN Osbaldo Meehan MD      vitamin E (tocopherol)  400 Units Oral Daily Osbaldo Meehan MD          Today, Patient Was Seen By: Coby Nicole MD    ** Please Note: Dictation voice to text software may have been used in the creation of this document   **

## 2021-02-24 NOTE — CASE MANAGEMENT
Met with pt and pt's wife Octavio Mendez to discuss dc plan  CM informed pt and Octavio Mendez that [facility] denied--pt has progressed to being too functional for ARC  Pt is now Supervision/Mod I with therapy and they are recommending home with home health services  Octavio Mendez is concerned about pt returning home at this time  Octavio Mendez states pt has 15 steps to the bed/bathroom  CM discussed with Suman Hem PT to see tomorrow morning to trial steps  Octavio Mendez requested a call from therapy after session; CM informed Suman Hem PT of same  CM to follow up with therapy, pt and Octavio Mendez tomorrow to discuss plan

## 2021-02-24 NOTE — ASSESSMENT & PLAN NOTE
Lab Results   Component Value Date    HGBA1C 6 0 (H) 02/18/2021     Hemoglobin A1c; glucose checks per protocol with insulin sliding scale    Recent Labs     02/23/21  2221 02/24/21  0634 02/24/21  1200 02/24/21  1609   POCGLU 187* 98 142* 180*       Blood Sugar Average: Last 72 hrs:  (P) 624 [id number]     iss and accucheks  Hypoglycemia protocol

## 2021-02-24 NOTE — PROGRESS NOTES
Progress Note - Infectious Disease   Prem Doyle [de-identified] y o  male MRN: [id number]  Unit/Bed#: [facility] 723-01 Encounter: [id number]      Impression/Plan:  Right-sided pelvic abscess  -likely secondary to radical cysto prostatectomy with ileal conduit at [facility] vs small bowel fistula to abscesses   -CT abdomen pelvis- mass noted measuring 4 5 x 5 8 x 5 cm in right hemipelvis  - IR guided drain placement  -blood cultures negative at 5 days  -CT abdomen pelvis with oral contrast-persistent soft tissue thickening about the pigtail catheter measuring 6 6 x 4 6 cm which may be due to scarring  -IR to evaluate drains today to see if any communication with bowels noted  -2 KIKI drains with milky discharge; 60 mL output last 24 hr  -Body fluid cultures growing E coli and Klebsiella oxytoca              -both species pan sensitive except ampicillin resistant for Klebsiella  -Unasyn until ; 7 days post drainage  -patient will need total of 7 days after undergoing KIKI drain placement     Leukocytosis  -continue to monitor at this time; patient remains afebrile and no signs of worsening infection at this time  -WBC 16 17      KO  -baseline creatinine between 1 2- 6  -creatinine continuing to improve; close to patient's baseline  -management per primary team     Bladder cancer  -patient underwent radical cysto prostatectomy with ileal conduit at [facility]  -urology following     Antibiotics:  Unasyn day 3  Antibiotics day 8    Subjective:  Patient has no fever, chills, sweats; no nausea, vomiting, diarrhea; no cough, shortness of breath; no pain  No new symptoms  Objective:  Vitals:  Temp:  [97 4 °F (36 3 °C)-98 6 °F (37 °C)] 97 4 °F (36 3 °C)  HR:  [58-91] 66  Resp:  [15-18] 18  BP: (134-165)/(62-81) 165/80  SpO2:  [86 %-98 %] 98 %  Temp (24hrs), Av °F (36 7 °C), Min:97 4 °F (36 3 °C), Max:98 6 °F (37 °C)  Current: Temperature: (!) 97 4 °F (36 3 °C)    Physical Exam  Vitals signs and nursing note reviewed  Constitutional:       General: He is not in acute distress  Appearance: He is well-developed  HENT:      Head: Normocephalic and atraumatic  Eyes:      General: No scleral icterus  Conjunctiva/sclera: Conjunctivae normal    Cardiovascular:      Rate and Rhythm: Normal rate and regular rhythm  Heart sounds: Normal heart sounds  No murmur  No friction rub  No gallop  Pulmonary:      Effort: Pulmonary effort is normal  No respiratory distress  Breath sounds: Normal breath sounds  No wheezing or rales  Abdominal:      General: Bowel sounds are normal  There is no distension  Palpations: Abdomen is soft  Tenderness: There is no abdominal tenderness  Comments: Two supra pubic KIKI drains in place with sites no erythema or purulence  Right ileal conduit   Musculoskeletal: Normal range of motion  Skin:     General: Skin is warm  Findings: No rash  Neurological:      Mental Status: He is alert and oriented to person, place, and time             Labs, Imaging, & Other studies:   All pertinent labs and imaging studies were personally reviewed  Results from last 7 days   Lab Units 02/24/21  0530 02/23/21  0449 02/22/21  0529   WBC Thousand/uL 16 17* 13 90* 14 35*   HEMOGLOBIN g/dL 8 7* 9 3* 9 2*   PLATELETS Thousands/uL 221 235 230     Results from last 7 days   Lab Units 02/24/21  0530 02/23/21  0449 02/22/21  0529  02/18/21  1126 02/18/21  0449 02/17/21  2107   SODIUM mmol/L 143 139 139   < > 137 135* 132*   POTASSIUM mmol/L 4 9 4 5 5 0   < > 4 4 4 5 4 7   CHLORIDE mmol/L 115* 112* 114*   < > 111* 110* 106   CO2 mmol/L 19* 18* 18*   < > 13* 12* 13*   BUN mg/dL 26* 33* 41*   < > 66* 69* 72*   CREATININE mg/dL 1 94* 2 12* 2 40*   < > 3 33* 3 32* 3 59*   EGFR ml/min/1 73sq m 32 29 25   < > 17 17 15   CALCIUM mg/dL 8 3 8 3 8 0*   < > 7 9* 7 7* 8 7   AST U/L  --   --   --   --  18 21 22   ALT U/L  --   --   --   --  19 19 25   ALK PHOS U/L  --   --   --   --  72 72 98    < > = values in this interval not displayed  Results from last 7 days   Lab Units 02/19/21  1637 02/19/21  1548 02/17/21  2150 02/17/21 2122   BLOOD CULTURE   --   --  No Growth After 5 Days  No Growth After 5 Days     GRAM STAIN RESULT  4+ Gram variable rods*  4+ Disintegrating polys* 3+ Gram positive rods*  Rare Gram negative rods*  4+ Disintegrating polys*  --   --    BODY FLUID CULTURE, STERILE  4+ Growth of Escherichia coli*  Few Colonies of Klebsiella oxytoca*  4+ Growth of Lactobacillus species* 4+ Growth of Lactobacillus species*  Growth in Broth culture only Staphylococcus aureus*  Growth in Broth culture only Escherichia coli*  --   --      Results from last 7 days   Lab Units 02/22/21  0529 02/21/21  0536 02/20/21  0513 02/19/21  0857 02/18/21  1126 02/18/21  0034   PROCALCITONIN ng/ml 0 55* 0 40* 0 65* 0 76* 0 90* 0 90*

## 2021-02-24 NOTE — PLAN OF CARE
Problem: Potential for Falls  Goal: Patient will remain free of falls  Description: INTERVENTIONS:  - Assess patient frequently for physical needs  -  Identify cognitive and physical deficits and behaviors that affect risk of falls    -  [place] fall precautions as indicated by assessment   - Educate patient/family on patient safety including physical limitations  - Instruct patient to call for assistance with activity based on assessment  - Modify environment to reduce risk of injury  - Consider OT/PT consult to assist with strengthening/mobility  Outcome: Progressing     Problem: PAIN - ADULT  Goal: Verbalizes/displays adequate comfort level or baseline comfort level  Description: Interventions:  - Encourage patient to monitor pain and request assistance  - Assess pain using appropriate pain scale  - Administer analgesics based on type and severity of pain and evaluate response  - Implement non-pharmacological measures as appropriate and evaluate response  - Consider cultural and social influences on pain and pain management  - Notify physician/advanced practitioner if interventions unsuccessful or patient reports new pain  Outcome: Progressing     Problem: INFECTION - ADULT  Goal: Absence or prevention of progression during hospitalization  Description: INTERVENTIONS:  - Assess and monitor for signs and symptoms of infection  - Monitor lab/diagnostic results  - Monitor all insertion sites, i e  indwelling lines, tubes, and drains  - Monitor endotracheal if appropriate and nasal secretions for changes in amount and color  - [place] appropriate cooling/warming therapies per order  - Administer medications as ordered  - Instruct and encourage patient and family to use good hand hygiene technique  - Identify and instruct in appropriate isolation precautions for identified infection/condition  Outcome: Progressing     Problem: SAFETY ADULT  Goal: Patient will remain free of falls  Description: INTERVENTIONS:  - Assess patient frequently for physical needs  -  Identify cognitive and physical deficits and behaviors that affect risk of falls    -  [place] fall precautions as indicated by assessment   - Educate patient/family on patient safety including physical limitations  - Instruct patient to call for assistance with activity based on assessment  - Modify environment to reduce risk of injury  - Consider OT/PT consult to assist with strengthening/mobility  Outcome: Progressing  Goal: Maintain or return to baseline ADL function  Description: INTERVENTIONS:  -  Assess patient's ability to carry out ADLs; assess patient's baseline for ADL function and identify physical deficits which impact ability to perform ADLs (bathing, care of mouth/teeth, toileting, grooming, dressing, etc )  - Assess/evaluate cause of self-care deficits   - Assess range of motion  - Assess patient's mobility; develop plan if impaired  - Assess patient's need for assistive devices and provide as appropriate  - Encourage maximum independence but intervene and supervise when necessary  - Involve family in performance of ADLs  - Assess for home care needs following discharge   - Consider OT consult to assist with ADL evaluation and planning for discharge  - Provide patient education as appropriate  Outcome: Progressing  Goal: Maintain or return mobility status to optimal level  Description: INTERVENTIONS:  - Assess patient's baseline mobility status (ambulation, transfers, stairs, etc )    - Identify cognitive and physical deficits and behaviors that affect mobility  - Identify mobility aids required to assist with transfers and/or ambulation (gait belt, sit-to-stand, lift, walker, cane, etc )  - [place] fall precautions as indicated by assessment  - Record patient progress and toleration of activity level on Mobility SBAR; progress patient to next Phase/Stage  - Instruct patient to call for assistance with activity based on assessment  - Consider rehabilitation consult to assist with strengthening/weightbearing, etc   Outcome: Progressing     Problem: DISCHARGE PLANNING  Goal: Discharge to home or other facility with appropriate resources  Description: INTERVENTIONS:  - Identify barriers to discharge w/patient and caregiver  - Arrange for needed discharge resources and transportation as appropriate  - Identify discharge learning needs (meds, wound care, etc )  - Arrange for interpretive services to assist at discharge as needed  - Refer to Case Management Department for coordinating discharge planning if the patient needs post-hospital services based on physician/advanced practitioner order or complex needs related to functional status, cognitive ability, or social support system  Outcome: Progressing     Problem: Knowledge Deficit  Goal: Patient/family/caregiver demonstrates understanding of disease process, treatment plan, medications, and discharge instructions  Description: Complete learning assessment and assess knowledge base  Interventions:  - Provide teaching at level of understanding  - Provide teaching via preferred learning methods  Outcome: Progressing     Problem: Nutrition/Hydration-ADULT  Goal: Nutrient/Hydration intake appropriate for improving, restoring or maintaining nutritional needs  Description: Monitor and assess patient's nutrition/hydration status for malnutrition  Collaborate with interdisciplinary team and initiate plan and interventions as ordered  Monitor patient's weight and dietary intake as ordered or per policy  Utilize nutrition screening tool and intervene as necessary  Determine patient's food preferences and provide high-protein, high-caloric foods as appropriate       INTERVENTIONS:  - Monitor oral intake, urinary output, labs, and treatment plans  - Assess nutrition and hydration status and recommend course of action  - Evaluate amount of meals eaten  - Assist patient with eating if necessary   - Allow adequate time for meals  - Recommend/ encourage appropriate diets, oral nutritional supplements, and vitamin/mineral supplements  - Order, calculate, and assess calorie counts as needed  - Recommend, monitor, and adjust tube feedings and TPN/PPN based on assessed needs  - Assess need for intravenous fluids  - Provide specific nutrition/hydration education as appropriate  - Include patient/family/caregiver in decisions related to nutrition  Outcome: Progressing     Problem: Prexisting or High Potential for Compromised Skin Integrity  Goal: Skin integrity is maintained or improved  Description: INTERVENTIONS:  - Identify patients at risk for skin breakdown  - Assess and monitor skin integrity  - Assess and monitor nutrition and hydration status  - Monitor labs   - Assess for incontinence   - Turn and reposition patient  - Assist with mobility/ambulation  - Relieve pressure over bony prominences  - Avoid friction and shearing  - Provide appropriate hygiene as needed including keeping skin clean and dry  - Evaluate need for skin moisturizer/barrier cream  - Collaborate with interdisciplinary team   - Patient/family teaching  - Consider wound care consult   Outcome: Progressing no

## 2021-02-24 NOTE — PROGRESS NOTES
Patient resting in bed, family at the bedside   [place] within reach no complains at this time will continue to monitor

## 2021-02-24 NOTE — ASSESSMENT & PLAN NOTE
· POA with leukocytosis and tachycardia  · Likely source right pelvic abscess  ·  Known h/o radical cysto prostatectomy with ileal conduit at [facility]  · Blood cultures negative at 4 days  · UA relatively unremarkable for significant infection  · Procalcitonin elevated at 0 9>> 0 76>> 0;4, Leukocytosis trending down as well  · CT abdomen pelvis concerning for anterior abdominal wall cellulitis and/ or abscess in surgical area  · Status post IR guided drainage and drain placement of a right pelvic abscess on 2/19  · Drainage Cultures from IR are growing E coli and Klebsiella so far pending susceptibilities  Gram-positive rods also noted  PLAN:  · stopped IV cefepime Flagyl, day 5, changed to Unasyn by ID day 2  · Follow blood culture and urine culture grew different strains   · Concern for fistula, CT abdomen and pelvis without contrast ordered with po only contrast to see if there is any fistula  · procalcitonin neg  · Urology, nephrology, ID on board appreciate input  · Trend CBC  · Went to IR today for drain adjustment, CT scan from 2/23 did not show leak of fluid from GI tract, so, less likely fistula  No need for contrast CT for now  · [place] improved

## 2021-02-24 NOTE — BRIEF OP NOTE (RAD/CATH)
INTERVENTIONAL RADIOLOGY PROCEDURE NOTE    Date: 2/24/2021    Procedure: Procedure name not found  Preoperative diagnosis:   1  Elevated troponin    2  Abnormal CT of the abdomen    3  Weakness    4  Leukocytosis    5  Malignant neoplasm of overlapping sites of bladder ([facility] )    6  Sepsis ([facility] )    7  Right sided Pelvic abscess in [facility])    8  Acute kidney injury ([facility] )    9  Severe protein-calorie malnutrition ([place] [address] )    10  Electrolyte disturbance         Postoperative diagnosis: Same  Surgeon: Adri Harrington MD     Assistant: None  No qualified resident was available  Blood loss: none    Specimens: none     Findings: Exchange of both catheters for 10 Fr drains with aspiration of approximately 15 ml purulent fluid  Complications: None immediate      Anesthesia: local

## 2021-02-24 NOTE — PLAN OF CARE
Problem: Potential for Falls  Goal: Patient will remain free of falls  Description: INTERVENTIONS:  - Assess patient frequently for physical needs  -  Identify cognitive and physical deficits and behaviors that affect risk of falls    -  [place] fall precautions as indicated by assessment   - Educate patient/family on patient safety including physical limitations  - Instruct patient to call for assistance with activity based on assessment  - Modify environment to reduce risk of injury  - Consider OT/PT consult to assist with strengthening/mobility  Outcome: Progressing     Problem: PAIN - ADULT  Goal: Verbalizes/displays adequate comfort level or baseline comfort level  Description: Interventions:  - Encourage patient to monitor pain and request assistance  - Assess pain using appropriate pain scale  - Administer analgesics based on type and severity of pain and evaluate response  - Implement non-pharmacological measures as appropriate and evaluate response  - Consider cultural and social influences on pain and pain management  - Notify physician/advanced practitioner if interventions unsuccessful or patient reports new pain  Outcome: Progressing     Problem: INFECTION - ADULT  Goal: Absence or prevention of progression during hospitalization  Description: INTERVENTIONS:  - Assess and monitor for signs and symptoms of infection  - Monitor lab/diagnostic results  - Monitor all insertion sites, i e  indwelling lines, tubes, and drains  - Monitor endotracheal if appropriate and nasal secretions for changes in amount and color  - [place] appropriate cooling/warming therapies per order  - Administer medications as ordered  - Instruct and encourage patient and family to use good hand hygiene technique  - Identify and instruct in appropriate isolation precautions for identified infection/condition  Outcome: Progressing     Problem: SAFETY ADULT  Goal: Patient will remain free of falls  Description: INTERVENTIONS:  - Assess patient frequently for physical needs  -  Identify cognitive and physical deficits and behaviors that affect risk of falls    -  [place] fall precautions as indicated by assessment   - Educate patient/family on patient safety including physical limitations  - Instruct patient to call for assistance with activity based on assessment  - Modify environment to reduce risk of injury  - Consider OT/PT consult to assist with strengthening/mobility  Outcome: Progressing  Goal: Maintain or return to baseline ADL function  Description: INTERVENTIONS:  -  Assess patient's ability to carry out ADLs; assess patient's baseline for ADL function and identify physical deficits which impact ability to perform ADLs (bathing, care of mouth/teeth, toileting, grooming, dressing, etc )  - Assess/evaluate cause of self-care deficits   - Assess range of motion  - Assess patient's mobility; develop plan if impaired  - Assess patient's need for assistive devices and provide as appropriate  - Encourage maximum independence but intervene and supervise when necessary  - Involve family in performance of ADLs  - Assess for home care needs following discharge   - Consider OT consult to assist with ADL evaluation and planning for discharge  - Provide patient education as appropriate  Outcome: Progressing  Goal: Maintain or return mobility status to optimal level  Description: INTERVENTIONS:  - Assess patient's baseline mobility status (ambulation, transfers, stairs, etc )    - Identify cognitive and physical deficits and behaviors that affect mobility  - Identify mobility aids required to assist with transfers and/or ambulation (gait belt, sit-to-stand, lift, walker, cane, etc )  - [place] fall precautions as indicated by assessment  - Record patient progress and toleration of activity level on Mobility SBAR; progress patient to next Phase/Stage  - Instruct patient to call for assistance with activity based on assessment  - Consider rehabilitation consult to assist with strengthening/weightbearing, etc   Outcome: Progressing     Problem: DISCHARGE PLANNING  Goal: Discharge to home or other facility with appropriate resources  Description: INTERVENTIONS:  - Identify barriers to discharge w/patient and caregiver  - Arrange for needed discharge resources and transportation as appropriate  - Identify discharge learning needs (meds, wound care, etc )  - Arrange for interpretive services to assist at discharge as needed  - Refer to Case Management Department for coordinating discharge planning if the patient needs post-hospital services based on physician/advanced practitioner order or complex needs related to functional status, cognitive ability, or social support system  Outcome: Progressing     Problem: Knowledge Deficit  Goal: Patient/family/caregiver demonstrates understanding of disease process, treatment plan, medications, and discharge instructions  Description: Complete learning assessment and assess knowledge base  Interventions:  - Provide teaching at level of understanding  - Provide teaching via preferred learning methods  Outcome: Progressing     Problem: Nutrition/Hydration-ADULT  Goal: Nutrient/Hydration intake appropriate for improving, restoring or maintaining nutritional needs  Description: Monitor and assess patient's nutrition/hydration status for malnutrition  Collaborate with interdisciplinary team and initiate plan and interventions as ordered  Monitor patient's weight and dietary intake as ordered or per policy  Utilize nutrition screening tool and intervene as necessary  Determine patient's food preferences and provide high-protein, high-caloric foods as appropriate       INTERVENTIONS:  - Monitor oral intake, urinary output, labs, and treatment plans  - Assess nutrition and hydration status and recommend course of action  - Evaluate amount of meals eaten  - Assist patient with eating if necessary   - Allow adequate time for meals  - Recommend/ encourage appropriate diets, oral nutritional supplements, and vitamin/mineral supplements  - Order, calculate, and assess calorie counts as needed  - Recommend, monitor, and adjust tube feedings and TPN/PPN based on assessed needs  - Assess need for intravenous fluids  - Provide specific nutrition/hydration education as appropriate  - Include patient/family/caregiver in decisions related to nutrition  Outcome: Progressing     Problem: Prexisting or High Potential for Compromised Skin Integrity  Goal: Skin integrity is maintained or improved  Description: INTERVENTIONS:  - Identify patients at risk for skin breakdown  - Assess and monitor skin integrity  - Assess and monitor nutrition and hydration status  - Monitor labs   - Assess for incontinence   - Turn and reposition patient  - Assist with mobility/ambulation  - Relieve pressure over bony prominences  - Avoid friction and shearing  - Provide appropriate hygiene as needed including keeping skin clean and dry  - Evaluate need for skin moisturizer/barrier cream  - Collaborate with interdisciplinary team   - Patient/family teaching  - Consider wound care consult   Outcome: Progressing

## 2021-02-24 NOTE — PROGRESS NOTES
Progress Note - Nephrology   Jose Cotter [de-identified] y o  male MRN: [id number]  Unit/Bed#: [facility] 723-01 Encounter: [id number]    ASSESSMENT AND PLAN:  Maribel Hall is an [de-identified] M with PMH CAD, bladder cancer s/p ileal conduit, T2DM, HTN, CKD stage 3 who is here with abdomino-pelvic abscess  1  KO on CKD stage 3  Cr on presentation was 3 59  Has been improving slightly and is down to 1 96 today  Baseline 1 2-1 6  Has been receiving fluids for suspected prerenal etiology  In setting of sepsis, ATN is a possibility  Was also initially oliguric on admission  With relative improvements with fluids suspect prerenal etiology  UA with 10-20 WBCs, 1+ protein, and coarse granular casts on 2/18/21  CT did not show hydronephrosis on 2/23/21    - Agree with holding ARB, BP well controlled  - Can d/c fluids if there are no plans for IV contrast  Evidence of pleural effusions on most recent CT and patient appears euvolemic    - Avoid hypotension  - Avoid nephrotoxins if possible  ID requesting contrast CT to evaluate for possible fistula  Ended up getting CT with PO contrast instead of IV, but imaging report noted some limitations due to lack of IV contrast  Patient can receive IV contrast if necessary  He is moderate risk for ISRRAEL  Will need pre and post hydration  Can stop mucomyst for now, but if there is a plan for IV contrast can restart mucomyst 1200mg BID x4    - Continue unasyn dosing q12 for renal dosing  2  Blood pressure  Acceptable in 150s-160s/70s-80s  - Continue to hold ARB     3  Electrolytes  Bicarb 19, normal AG, improving slightly  Acidosis potentially related to renal disease  Mg 1 8 and phos 2 4    - VBG to assess acidemia  - Received sodium bicarb x2 overnight      4  Anemia  Hgb in 8-9 range during admission  Baseline closer to 12   Could be dilutional, as patient has received a lot of fluids   - Suspect likely dilutional, but with age, anemia, and worsening renal function, might consider ordering SPEP/UPEP for myeloma work-up  - Consider iron studies    5  Abdomino-pelvic abscess  Currently on unasyn  S/p IR drainage   - Management per primary team and ID  - Going for IR tube change today       Subjective:   Reports he feels well  Says IR told him the drain change was successful  Has had multiple episodes of diarrhea though  Denies nausea, vomiting, or abdominal pain  No chest pain or SOB  No fevers or chills overnight  Objective:     Vitals: Blood pressure 165/80, pulse 66, temperature (!) 97 4 °F (36 3 °C), resp  rate 18, height 5' 8" (1 727 m), weight 80 7 kg (177 lb 14 6 oz), SpO2 98 %  ,Body mass index is 27 05 kg/m²  Weight (last 2 days)     Date/Time   Weight    02/24/21 0600   80 7 (177 91)                Intake/Output Summary (Last 24 hours) at 2/24/2021 0756  Last data filed at 2/24/2021 0537  Gross per 24 hour   Intake 1925 ml   Output 3110 ml   Net -1185 ml            Physical Exam:   General:  No acute distress  Skin:  No acute rash  Eyes:  No scleral icterus and noninjected  ENT:  Moist mucous membranes  Neck:  Supple, no jugular venous distention, trachea midline, overall appearance is normal  Chest:  Clear to auscultation  CVS:  Regular rate and rhythm, without a rub or gallops  Abdomen:  Normal bowel sounds, soft and nontender and nondistended, KIKI drains in place  Ileal conduit stoma looks clean     Extremities:  No edema, and no cyanosis, no significant arthritic changes  Neuro:  No gross focality  Psych:  Alert and oriented and appropriate                Medications:    Scheduled Meds:  Current Facility-Administered Medications   Medication Dose Route Frequency Provider Last Rate    acetaminophen  650 mg Oral Q6H PRN Anne Marie Camacho MD      acetylcysteine  1,200 mg Oral BID Lora Harrison MD      aluminum-magnesium hydroxide-simethicone  30 mL Oral Q6H PRN Anne Marie Camacho MD      ampicillin-sulbactam  3 g Intravenous Q12H Rory Kelley MD 3 g (02/24/21 2761)    aspirin  81 mg Oral Daily Jessica Hernandez MD      atorvastatin  40 mg Oral HS Jessica Hernandez MD      bisacodyl  10 mg Rectal Daily PRN Pushpa Osorio MD      cholecalciferol  2,000 Units Oral Daily Jessica Hernandez MD      citalopram  20 mg Oral Daily Jessica Hernandez MD      docusate sodium  100 mg Oral BID Pushpa Osorio MD      fish oil  1,000 mg Oral Daily Jessica Hernandez MD      fluticasone  2 spray Nasal Daily Jessica Hernandez MD      heparin (porcine)  5,000 Units Subcutaneous [facility] Jessica Hernandez MD      insulin lispro  1-5 Units Subcutaneous TID [facility] Jessica Hernandez MD      insulin lispro  1-5 Units Subcutaneous HS Jessica Hernandez MD      metoprolol tartrate  50 mg Oral BID Pushpa Osorio MD      multi-electrolyte  75 mL/hr Intravenous Continuous Pushpa Osorio MD 75 mL/hr (02/23/21 2228)    multivitamin-minerals  1 tablet Oral Daily Jessica Hernandez MD      nystatin   Topical TID Jessica Hernandez MD      ondansetron  4 mg Intravenous Q6H PRN Jessica Hernandez MD      pantoprazole  40 mg Oral Early Morning Jessica Hernandez MD      polyethylene glycol  17 g Oral Daily Pushpa Osorio MD      senna  1 tablet Oral BID Pushpa Osorio MD      sodium bicarbonate  650 mg Oral BID after meals Alexandre Almendarez MD      sodium chloride  1 spray Each Nare Q1H PRN Pushpa Osorio MD      traMADol  50 mg Oral Q6H PRN Jessica Hernandez MD      vitamin E (tocopherol)  400 Units Oral Daily Jessica Hernandez MD         PRN Meds:   acetaminophen    aluminum-magnesium hydroxide-simethicone    bisacodyl    ondansetron    sodium chloride    traMADol    Continuous Infusions:multi-electrolyte, 75 mL/hr, Last Rate: 75 mL/hr (02/23/21 2228)        Lab, Imaging and other studies: I have personally reviewed pertinent labs    Laboratory Results:  Results from last 7 days   Lab Units 02/24/21  0530 02/23/21  0449 02/22/21  0529 02/21/21  0536 02/20/21  0513 02/19/21  0857 02/18/21  1126 02/18/21  0449   WBC Thousand/uL 16 17* 13 90* 14 35* 13 46* 15 26* 18 01* 17 34* 16 57*   HEMOGLOBIN g/dL 8 7* 9 3* 9 2* 8 7* 9 2* 9 3* 9 5* 9 0*   HEMATOCRIT % 27 1* 28 4* 28 5* 26 1* 28 2* 28 2* 29 5* 27 7*   PLATELETS Thousands/uL 221 235 230 212 197 188 183 180   POTASSIUM mmol/L 4 9 4 5 5 0 4 2 4 3 4 3 4 4 4 5   CHLORIDE mmol/L 115* 112* 114* 112* 113* 110* 111* 110*   CO2 mmol/L 19* 18* 18* 14* 13* 14* 13* 12*   BUN mg/dL 26* 33* 41* 47* 50* 57* 66* 69*   CREATININE mg/dL 1 94* 2 12* 2 40* 2 70* 2 85* 3 21* 3 33* 3 32*   CALCIUM mg/dL 8 3 8 3 8 0* 7 5* 7 7* 7 7* 7 9* 7 7*   MAGNESIUM mg/dL  --  1 8 2 0 2 0 2 1 0 9* 0 7* 0 9*   PHOSPHORUS mg/dL  --  2 4 1 9* 1 8* 2 9 1 7* 1 6* 2 0*     Urinalysis:   Lab Results   Component Value Date    COLORU Yellow 02/18/2021    COLORU Yellow 10/10/2017    CLARITYU Clear 02/18/2021    CLARITYU Transparent 10/10/2017    SPECGRAV 1 009 02/18/2021    SPECGRAV <=1 005 10/10/2017    PHUR 7 0 02/18/2021    PHUR 5 5 05/24/2018    PHUR 5 5 10/10/2017    LEUKOCYTESUR Large (A) 02/18/2021    LEUKOCYTESUR Negative 10/10/2017    NITRITE Negative 02/18/2021    NITRITE Negative 10/10/2017    PROTEINUA Negative 10/10/2017    GLUCOSEU Negative 02/18/2021    GLUCOSEU Negative 07/14/2014    KETONESU Negative 02/18/2021    KETONESU Negative 10/10/2017    BILIRUBINUR Negative 02/18/2021    BILIRUBINUR Negative 10/10/2017    BLOODU Small (A) 02/18/2021    BLOODU Negative 10/10/2017     ABGs: No results found for: [facility]  Radiology review:

## 2021-02-25 LAB
ANION GAP SERPL CALCULATED.3IONS-SCNC: 6 MMOL/L (ref 4–13)
ANISOCYTOSIS BLD QL SMEAR: PRESENT
BASE EX.OXY STD BLDV CALC-SCNC: 83.2 % (ref 60–80)
BASE EXCESS BLDV CALC-SCNC: -8.1 MMOL/L
BASOPHILS # BLD MANUAL: 0 THOUSAND/UL (ref 0–0.1)
BASOPHILS NFR MAR MANUAL: 0 % (ref 0–1)
BUN SERPL-MCNC: 23 MG/DL (ref 5–25)
BURR CELLS BLD QL SMEAR: PRESENT
CALCIUM SERPL-MCNC: 8.6 MG/DL (ref 8.3–10.1)
CHLORIDE SERPL-SCNC: 116 MMOL/L (ref 100–108)
CO2 SERPL-SCNC: 20 MMOL/L (ref 21–32)
CREAT SERPL-MCNC: 1.97 MG/DL (ref 0.6–1.3)
EOSINOPHIL # BLD MANUAL: 0.5 THOUSAND/UL (ref 0–0.4)
EOSINOPHIL NFR BLD MANUAL: 3 % (ref 0–6)
ERYTHROCYTE [DISTWIDTH] IN BLOOD BY AUTOMATED COUNT: 15.2 % (ref 11.6–15.1)
GFR SERPL CREATININE-BSD FRML MDRD: 31 ML/MIN/1.73SQ M
GLUCOSE SERPL-MCNC: 101 MG/DL (ref 65–140)
GLUCOSE SERPL-MCNC: 111 MG/DL (ref 65–140)
GLUCOSE SERPL-MCNC: 140 MG/DL (ref 65–140)
GLUCOSE SERPL-MCNC: 166 MG/DL (ref 65–140)
GLUCOSE SERPL-MCNC: 191 MG/DL (ref 65–140)
HCO3 BLDV-SCNC: 16.4 MMOL/L (ref 24–30)
HCT VFR BLD AUTO: 28.7 % (ref 36.5–49.3)
HGB BLD-MCNC: 9.2 G/DL (ref 12–17)
LYMPHOCYTES # BLD AUTO: 1 THOUSAND/UL (ref 0.6–4.47)
LYMPHOCYTES # BLD AUTO: 6 % (ref 14–44)
MCH RBC QN AUTO: 31.2 PG (ref 26.8–34.3)
MCHC RBC AUTO-ENTMCNC: 32.1 G/DL (ref 31.4–37.4)
MCV RBC AUTO: 97 FL (ref 82–98)
MONOCYTES # BLD AUTO: 0.67 THOUSAND/UL (ref 0–1.22)
MONOCYTES NFR BLD: 4 % (ref 4–12)
NEUTROPHILS # BLD MANUAL: 14.47 THOUSAND/UL (ref 1.85–7.62)
NEUTS SEG NFR BLD AUTO: 87 % (ref 43–75)
NRBC BLD AUTO-RTO: 0 /100 WBCS
O2 CT BLDV-SCNC: 11.7 ML/DL
PCO2 BLDV: 29.9 MM HG (ref 42–50)
PH BLDV: 7.36 [PH] (ref 7.3–7.4)
PLATELET # BLD AUTO: 235 THOUSANDS/UL (ref 149–390)
PLATELET BLD QL SMEAR: ADEQUATE
PMV BLD AUTO: 11.2 FL (ref 8.9–12.7)
PO2 BLDV: 48.7 MM HG (ref 35–45)
POIKILOCYTOSIS BLD QL SMEAR: PRESENT
POLYCHROMASIA BLD QL SMEAR: PRESENT
POTASSIUM SERPL-SCNC: 4.8 MMOL/L (ref 3.5–5.3)
RBC # BLD AUTO: 2.95 MILLION/UL (ref 3.88–5.62)
RBC MORPH BLD: PRESENT
SCHISTOCYTES BLD QL SMEAR: PRESENT
SODIUM SERPL-SCNC: 142 MMOL/L (ref 136–145)
WBC # BLD AUTO: 16.63 THOUSAND/UL (ref 4.31–10.16)

## 2021-02-25 PROCEDURE — 97535 SELF CARE MNGMENT TRAINING: CPT

## 2021-02-25 PROCEDURE — 99232 SBSQ HOSP IP/OBS MODERATE 35: CPT | Performed by: INTERNAL MEDICINE

## 2021-02-25 PROCEDURE — 99233 SBSQ HOSP IP/OBS HIGH 50: CPT | Performed by: INTERNAL MEDICINE

## 2021-02-25 PROCEDURE — 82805 BLOOD GASES W/O2 SATURATION: CPT | Performed by: INTERNAL MEDICINE

## 2021-02-25 PROCEDURE — 82948 REAGENT STRIP/BLOOD GLUCOSE: CPT

## 2021-02-25 PROCEDURE — 85007 BL SMEAR W/DIFF WBC COUNT: CPT | Performed by: INTERNAL MEDICINE

## 2021-02-25 PROCEDURE — 85027 COMPLETE CBC AUTOMATED: CPT | Performed by: INTERNAL MEDICINE

## 2021-02-25 PROCEDURE — 97116 GAIT TRAINING THERAPY: CPT

## 2021-02-25 PROCEDURE — 80048 BASIC METABOLIC PNL TOTAL CA: CPT | Performed by: INTERNAL MEDICINE

## 2021-02-25 RX ORDER — SODIUM CHLORIDE 9 MG/ML
10 INJECTION INTRAVENOUS DAILY
Qty: 60 SYRINGE | Refills: 0 | Status: SHIPPED | OUTPATIENT
Start: 2021-02-25 | End: 2022-03-31 | Stop reason: ALTCHOICE

## 2021-02-25 RX ADMIN — METOPROLOL TARTRATE 50 MG: 25 TABLET, FILM COATED ORAL at 18:18

## 2021-02-25 RX ADMIN — METOPROLOL TARTRATE 50 MG: 25 TABLET, FILM COATED ORAL at 10:00

## 2021-02-25 RX ADMIN — Medication 1 TABLET: at 10:00

## 2021-02-25 RX ADMIN — Medication 2000 UNITS: at 10:00

## 2021-02-25 RX ADMIN — ATORVASTATIN CALCIUM 40 MG: 20 TABLET, FILM COATED ORAL at 21:02

## 2021-02-25 RX ADMIN — ASPIRIN 81 MG: 81 TABLET, CHEWABLE ORAL at 10:00

## 2021-02-25 RX ADMIN — AMPICILLIN SODIUM AND SULBACTAM SODIUM 3 G: 2; 1 INJECTION, POWDER, FOR SOLUTION INTRAMUSCULAR; INTRAVENOUS at 10:02

## 2021-02-25 RX ADMIN — HEPARIN SODIUM 5000 UNITS: 5000 INJECTION INTRAVENOUS; SUBCUTANEOUS at 05:31

## 2021-02-25 RX ADMIN — HEPARIN SODIUM 5000 UNITS: 5000 INJECTION INTRAVENOUS; SUBCUTANEOUS at 21:02

## 2021-02-25 RX ADMIN — CITALOPRAM HYDROBROMIDE 20 MG: 20 TABLET ORAL at 10:00

## 2021-02-25 RX ADMIN — INSULIN LISPRO 1 UNITS: 100 INJECTION, SOLUTION INTRAVENOUS; SUBCUTANEOUS at 21:02

## 2021-02-25 RX ADMIN — INSULIN LISPRO 1 UNITS: 100 INJECTION, SOLUTION INTRAVENOUS; SUBCUTANEOUS at 12:28

## 2021-02-25 RX ADMIN — PANTOPRAZOLE SODIUM 40 MG: 40 TABLET, DELAYED RELEASE ORAL at 05:31

## 2021-02-25 RX ADMIN — HEPARIN SODIUM 5000 UNITS: 5000 INJECTION INTRAVENOUS; SUBCUTANEOUS at 13:29

## 2021-02-25 RX ADMIN — Medication 400 UNITS: at 10:00

## 2021-02-25 RX ADMIN — AMPICILLIN SODIUM AND SULBACTAM SODIUM 3 G: 2; 1 INJECTION, POWDER, FOR SOLUTION INTRAMUSCULAR; INTRAVENOUS at 01:47

## 2021-02-25 RX ADMIN — AMPICILLIN SODIUM AND SULBACTAM SODIUM 3 G: 2; 1 INJECTION, POWDER, FOR SOLUTION INTRAMUSCULAR; INTRAVENOUS at 18:13

## 2021-02-25 NOTE — PROGRESS NOTES
Progress Note - Infectious Disease   Darlene Gutierrez [de-identified] y o  male MRN: [id number]  Unit/Bed#: [facility] 723-01 Encounter: [id number]      Impression/Plan:  Right-sided pelvic abscess  -likely secondary to radical cysto prostatectomy with ileal conduit at [facility] vs small bowel fistula to abscesses   -CT abdomen pelvis- mass noted measuring 4 5 x 5 8 x 5 cm in right hemipelvis  - IR guided drain placement  -blood cultures negative at 5 days  -CT abdomen pelvis with oral contrast-persistent soft tissue thickening about the pigtail catheter measuring 6 6 x 4 6 cm which may be due to scarring  -2 KIKI drains with milky discharge;  20 mL output last 24 hr  -IR drain change-no communication to bowel noted however repeat study recommended after patient has resolution of cavity   -Body fluid cultures growing E coli and Klebsiella oxytoca  -Unasyn until ; 7 days post drainage  -patient will need total of 7 days after undergoing KIKI drain placement  -if patient leaves today would recommend discharge on Augmentin for 1 more day     Leukocytosis  -continue to monitor at this time; patient remains afebrile and no signs of worsening infection at this time  -SKS 78 84      KO  -baseline creatinine between 1 2-1 6  -creatinine 1 97  -management per primary team     Bladder cancer  -patient underwent radical cysto prostatectomy with ileal conduit at [facility]  -urology following     Antibiotics:  Unasyn day 4  Antibiotics day 9    Subjective:  Patient has no fever, chills, sweats; no nausea, vomiting, diarrhea; no cough, shortness of breath; no pain  No new symptoms      Objective:  Vitals:  Temp:  [97 8 °F (36 6 °C)-98 3 °F (36 8 °C)] 97 8 °F (36 6 °C)  HR:  [56-72] 68  Resp:  [17-18] 17  BP: (136-166)/(76-95) 164/94  SpO2:  [95 %-98 %] 96 %  Temp (24hrs), Av 1 °F (36 7 °C), Min:97 8 °F (36 6 °C), Max:98 3 °F (36 8 °C)  Current: Temperature: 97 8 °F (36 6 °C)    Physical Exam:   General Appearance:  Alert, interactive, nontoxic, no acute distress  Throat: Oropharynx moist without lesions  Lungs:   Clear to auscultation bilaterally; no wheezes, rhonchi or rales; respirations unlabored   Heart:  RRR; no murmur, rub or gallop   Abdomen:   Two KIKI drains in place with milky discharge; no tenderness or erythema noted Soft, non-tender, non-distended, positive bowel sounds  Extremities: No clubbing, cyanosis or edema   Skin: No new rashes or lesions  No draining wounds noted  Labs, Imaging, & Other studies:   All pertinent labs and imaging studies were personally reviewed  Results from last 7 days   Lab Units 02/25/21  0648 02/24/21  0530 02/23/21  0449   WBC Thousand/uL 16 63* 16 17* 13 90*   HEMOGLOBIN g/dL 9 2* 8 7* 9 3*   PLATELETS Thousands/uL 235 221 235     Results from last 7 days   Lab Units 02/25/21  0648 02/24/21  0530 02/23/21  0449  02/18/21  1126   SODIUM mmol/L 142 143 139   < > 137   POTASSIUM mmol/L 4 8 4 9 4 5   < > 4 4   CHLORIDE mmol/L 116* 115* 112*   < > 111*   CO2 mmol/L 20* 19* 18*   < > 13*   BUN mg/dL 23 26* 33*   < > 66*   CREATININE mg/dL 1 97* 1 94* 2 12*   < > 3 33*   EGFR ml/min/1 73sq m 31 32 29   < > 17   CALCIUM mg/dL 8 6 8 3 8 3   < > 7 9*   AST U/L  --   --   --   --  18   ALT U/L  --   --   --   --  19   ALK PHOS U/L  --   --   --   --  72    < > = values in this interval not displayed       Results from last 7 days   Lab Units 02/19/21  1637 02/19/21  1548   GRAM STAIN RESULT  4+ Gram variable rods*  4+ Disintegrating polys* 3+ Gram positive rods*  Rare Gram negative rods*  4+ Disintegrating polys*   BODY FLUID CULTURE, STERILE  4+ Growth of Escherichia coli*  Few Colonies of Klebsiella oxytoca*  4+ Growth of Lactobacillus species* 4+ Growth of Lactobacillus species*  Growth in Broth culture only Staphylococcus aureus*  Growth in Broth culture only Escherichia coli*     Results from last 7 days   Lab Units 02/22/21  0529 02/21/21  0536 02/20/21  0513 02/19/21  0857 02/18/21  1126 PROCALCITONIN ng/ml 0 55* 0 40* 0 65* 0 76* 0 90*

## 2021-02-25 NOTE — CASE MANAGEMENT
CM was informed by Suman Hem PT that pt was able to complete steps this morning during session  Suman Hem PT spoke with pt's wife to review therapy session  Received a call from from pt's wife Octavio Merritter to discuss dc plan  Octavoi Mendez states after discussion with Suman Hem PT, she is comfortable with pt returning home at dc  CM discussed HHC; Octavio Mendez is agreeable  A post acute care recommendation was made by your care team for [facility] [address]  Discussed Freedom of Choice with wife  List of agencies given to wife via phone  wife aware the list is custom filtered for them by preference  and that [facility] post acute providers are designated  Octavio Mendez is requesting [facility]  [address] referral sent  -[facility] accepted  CM added -[facility] to pt's dc instructions  CM spoke to Lady Walker with [facility] who states pt is scheduled for a visit on Saturday 2/27  CM also discussed a RW and shower chair; Octavio Mendez informed CM that they have both pieces of equipment at home  Plan for dc to home tomorrow 2/26 with [facility]; Octavio Mendez will transport pt home

## 2021-02-25 NOTE — DISCHARGE INSTRUCTIONS
Please flush tubes daily with 10cc NC   You will be scheduled for a 2 week drain check with IR  You will be contacted by IR scheduling for timing  Please call interventional radiology with any questions/concerns at:  [phone] Herbert PATIENTS: Contact Interventional Radiology at [phone]) Tammi John PATIENTS: Contact Interventional Radiology at [phone])   Please review following instructions for drain care           [address]  after your procedure:    Resume your normal diet  Small sips of flat soda will help with nausea  1  The properly functioning catheter should be forward flushed once (1x) daily with 10ml of normal saline using clean technique  You will be given a prescription for flushes  To flush the tube, clean both connections with alcohol swab  Twist off the drainage bag/ bulb  tubing and twist the saline syringe into the drainage tube and flush  Remove the syringe and twist the drainage bag / bulb tubing tubing back on     2  The drainage bag/bulb may be emptied as necessary  Keep a record of the amount of fluid you drain from your tube  This should be done with clean technique as well  3  A fresh dressing should be applied daily over the tube insertion site  4  As the tube is secured to the skin with only a suture,try not to pull on your tube  Tub baths are not permitted  Showers are permitted if the patient's skin entry site is prevented from getting wet  Similarly, washcloth "baths" are acceptable  Contact Interventional Radiology at [phone] Herbert PATIENTS: Contact Interventional Radiology at [phone]) Tammi John PATIENTS: Contact Interventional Radiology at [phone]) if:    1  Leakage or large amounts of liquid around the catheter  2  Fever of 101 degrees lasting several hours without other obvious cause (such as sore throat, flu, etc)  3  Persistent nausea or vomiting      4  Diminished drainage, which may be associated with pressure or pain  Or when the     drainage from your tube is less than 10mls for 48 hours  5  Catheter pulled back or falls out  The following pharmacies carry the flush syringes  [facility]                     [facility] [place]  [address] [facility]                         [address] [facility]  Phone [phone]            Phone [phone] Ros[address] Spann [address]             [facility]                                [phone] [address] [facility] Viviane Salazar PA                      [address] [facility]  Phone [phone]            Phone [phone]                      [facility]                                                                                                          [phone]  [facility]  [facility] [address]    [address]  Phone [phone]

## 2021-02-25 NOTE — PLAN OF CARE
Problem: OCCUPATIONAL THERAPY ADULT  Goal: Performs self-care activities at highest level of function for planned discharge setting  See evaluation for individualized goals  Description: Treatment Interventions: ADL retraining, Functional transfer training, UE strengthening/ROM, Endurance training, Patient/family training, Equipment evaluation/education, Compensatory technique education, Continued evaluation, Activityengagement          See flowsheet documentation for full assessment, interventions and recommendations  Outcome: Progressing  Note: Limitation: Decreased ADL status, Decreased UE strength, Decreased Safe judgement during ADL, Decreased cognition, Decreased endurance, Decreased self-care trans, Decreased high-level ADLs  Prognosis: Fair  Assessment: Pt participated in occupational therapy with focus on activity tolerance, functional transfers/mob standing tolerance and unsupported standing balance and tolerance for pt engagement in functional self-care task/oral care and UB and LB self-care, energy conservation techniques  Pt cleared by RN/Lukasz for pt participation in Occupational  therapy  Pt received sitting out of bed to bedside chair and agreeable to therapy pt known to OT from previous treatment session  Pt reported his goal to return to home with his wife  Pt did require min cognitive support throughout session and Min assist for LB dressing 2* pt coordination and pt deconditioning  Pt however progressing well and reported that his wife would be able to assist him with LB dressing  Pt then stated " I would like to not have to relay on her though"  Pt instructed on energy conservation techniques and LB dressing techniques with pt able to verbalize understanding  Pt required assist for toilet transfers this session 2* pt deconditioning  Pt would benefit from 3-in-one bedside commode and pt case management informed of OT findings  Pt will benefit from Home OT services   Pt chair alarm active post session all needs within reach        OT Discharge Recommendation: Other (Comment)  OT - OK to Discharge: (to STR)

## 2021-02-25 NOTE — PHYSICAL THERAPY NOTE
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)    History of bladder cancer    Elevated troponin    Anxiety and depression    Right sided Pelvic abscess in male [facility])    Severe protein-calorie malnutrition (HCC)    Electrolyte disturbance    low HCO3 with normal Anion gap       Past Medical History  Past Medical History:   Diagnosis Date    Acute kidney injury ([facility] )     Arthritis     Hands    Wright esophagus     BPH with obstruction/lower urinary tract symptoms     CAD (coronary artery disease)     Last assessed 09/16/15    Cancer ([facility] )     bladder    Cataract, acquired     Last assessed 10/10/17    Coronary artery disease     Diabetes mellitus ([facility] )     NIDDM    Diabetic neuropathy ([facility] )     Feet    Enlarged prostate with lower urinary tract symptoms (LUTS)     Last assessed 10/10/17    Erectile dysfunction     GERD (gastroesophageal reflux disease)     Last assessed 10/10/17    Hemoptysis     Last assessed 03/14/16    Hypercholesterolemia     Last assessed 10/10/17    Hypertension     Last assessed 10/10/17    Macular degeneration     Right eye is particularly affected    Myocardial infarction ([facility] [address] ) 1998    OAB (overactive bladder)     Testicular hypofunction     Testicular hypogonadism     Last assessed 10/10/17    Tinnitus     Umbilical hernia     Last assessed 06/18/14    Urge incontinence of urine     Wears glasses         Past Surgical History  Past Surgical History:   Procedure Laterality Date    COLONOSCOPY      CORONARY ARTERY BYPASS GRAFT  07/16/2014    ABG x 4 LIMA to LAD,SVG to diagnoal 2 SVG to OM-1, SVG to PDA, resection of partial plerual mass    CT GUIDED PERC DRAINAGE CATHETER PLACEMENT  2/19/2021    CYSTOSCOPY  2013    ESOPHAGOGASTRODUODENOSCOPY      FL RETROGRADE PYELOGRAM  12/20/2018    FL RETROGRADE PYELOGRAM  12/5/2019    INGUINAL HERNIA REPAIR Bilateral 2015    PHOTODYNAMIC THERAPY      For Barretts esophagus    ID COLONOSCOPY FLX DX W/COLLJ SPEC WHEN PFRMD N/A 4/25/2016    Procedure: COLONOSCOPY;  Surgeon: Jean-Pierre Norman MD;  Location:  GI LAB; Service: Gastroenterology    ID CYSTOURETHROSCOPY,BIOPSY N/A 9/10/2020    Procedure: CYSTOSCOPY, COLLECTION OF LEFT KIDNEY CYTOLOGY, BILATERAL RETROGRADE PYELOGRAM, BLADDER WALL BIOPSIES  AND [address], RANDOM BLADDER TUMOR BIOPSIES;  Surgeon: Danny Alas MD;  Location: [facility] MAIN OR;  Service: Urology    ID CYSTOURETHROSCOPY,FULGUR 2-5 CM LESN N/A 4/16/2020    Procedure: CYSTOSCOPY, TRANSURETHRAL RESECTION OF BLADDER TUMOR (TURBT); Surgeon: Danny Alas MD;  [facility];  Service: Urology    ID CYSTOURETHROSCOPY,FULGUR <0 5 CM LESN N/A 12/5/2019    Procedure: CYSTO W/TURBT AND TRANSURETHRAL PROSTATE BIOPSY AND OPENING OF BLADDER NECK CONTRACTURE, B/L Retrograde pyelogram;  Surgeon: Danny Alas MD;  [facility];  Service: Urology    TONSILLECTOMY      TRANSURETHRAL RESECTION OF PROSTATE N/A 12/20/2018    Procedure: CYSTO, PHOTO SELECTIVE VAPORIZATION OF PROSTATE, B/L RETROGRADE PYELOGRAM, TURBT;  Surgeon: Danny Alas MD;  [facility]: [facility];  Service: Urology    UMBILICAL HERNIA REPAIR  2012    UPPER GASTROINTESTINAL ENDOSCOPY           02/25/21 0821   PT Last Visit   PT Visit Date 02/25/21   Note Type   Note Type Treatment   Pain Assessment   Pain Assessment Tool 0-10   Pain Score No Pain   Restrictions/Precautions   Weight Bearing Precautions Per Order No   Braces or Orthoses   (none)   Other Precautions Chair Alarm; Bed Alarm;Cognitive; Fall Risk   Cognition   Overall Cognitive Status Impaired   Arousal/Participation Alert; Responsive; Cooperative   Attention Attends with cues to redirect   Orientation Level Oriented X4   Memory Decreased recall of precautions;Decreased recall of recent events   Following Commands Follows one step commands without difficulty   Comments Pleasant and cooperative   Bed Mobility   Additional Comments Not tested  Upon arrival, pt seated OOB  Concluded session with pt seated OOB with OT still present   Transfers   Sit to Stand 5  Supervision   Additional items Assist x 1; Increased time required;Armrests   Stand to Sit 5  Supervision   Additional items Assist x 1; Armrests; Increased time required   Additional Comments Completed functional transfers without physical assistance  Good hand placement  Increased time and effort to complete   Ambulation/Elevation   Gait pattern Excessively slow; Foward flexed   Gait Assistance 5  Supervision   Additional items Verbal cues   Assistive Device Rolling walker   Distance 40 feet x 2   Stair Management Assistance 5  Supervision  (close supervision)   Additional items Assist x 1;Verbal cues; Increased time required   Stair Management Technique One rail L;Step to pattern; Foreward   Number of Stairs 14   Balance   Static Sitting Normal   Dynamic Sitting Good   Static Standing Fair +   Dynamic Standing Fair   Ambulatory Fair -   Endurance Deficit   Endurance Deficit Yes   Endurance Deficit Description mild SOB post steps   Activity Tolerance   Activity Tolerance Patient tolerated treatment well   Medical Staff Made Aware CM   Nurse Made Aware Yes, RN    Assessment   Prognosis Good   Problem List Decreased strength;Decreased range of motion;Decreased endurance; Impaired balance;Decreased mobility; Decreased skin integrity;Pain   Assessment Pt continues to make significant progress in functional mobility  Able to perform functional transfers, ambulation, and stair management without any physical assistance   Pt did require increased time to complete all mobility and required use of RW  Pt was able to negotiate 14 steps safely with 1 handrail and utilized step to pattern  Did note mild ELIAS after stair management  Spoke to patient's wife today and addressed all questions to the best of my ability  Educated pt and pt's wife on the importance of continue use of RW, continue with home PT/OPPT, home safety, and energy conservation  At this time, recommend home with home PT  Recommend continue use use and the following DME: shower chair and BSC  Barriers to Discharge Inaccessible home environment   Goals   Patient Goals To be independent   STG Expiration Date 03/03/21   PT Treatment Day 2   Plan   Treatment/Interventions Functional transfer training;LE strengthening/ROM; Elevations;Gait training;Bed mobility; Patient/family training   Progress Improving as expected   PT Frequency   (3-5x/wk)   Recommendation   PT Discharge Recommendation Home with skilled therapy  (Home PT)   Equipment Recommended Walker  (BSC, shower chair)   PT - OK to Discharge Yes   Additional Comments once medically stable   AM-PAC Basic Mobility Inpatient   Turning in Bed Without Bedrails 4   Lying on Back to Sitting on Edge of Flat Bed 4   Moving Bed to Chair 4   Standing Up From Chair 4   Walk in Room 4   Climb 3-5 Stairs 4   Basic Mobility Inpatient Raw Score 24   Basic Mobility Standardized Score 57 68       Jona Garcia PT, DPT

## 2021-02-25 NOTE — OCCUPATIONAL THERAPY NOTE
Occupational Therapy Treatment Note     02/25/21 0905   OT Last Visit   OT Visit Date 02/25/21   Note Type   Note Type Treatment   Restrictions/Precautions   Weight Bearing Precautions Per Order No   Braces or Orthoses   (none )   Other Precautions Fall Risk;Pain   Lifestyle   Autonomy Pt was I with ADLs, required PRN assist with IADLs, and I with functional mobility with use of spc (but admits to falls)  Reciprocal Relationships supportive wife, but recovering from surgery   Service to Others retired   Intrinsic Gratification will continue to assess   Pain Assessment   Pain Assessment Tool 0-10   Pain Score No Pain   ADL   Where Assessed Standing at sink  (with chair set up for seated rests breaks )   Grooming Assistance 5  Supervision/Setup   Grooming Deficit Standing with assistive device   UB Bathing Assistance 5  Supervision/Setup   UB Bathing Deficit Setup; Increased time to complete   LB Bathing Assistance 5  Supervision/Setup   LB Bathing Deficit Increased time to complete   UB Dressing Assistance 5  Supervision/Setup   UB Dressing Deficit Increased time to complete   LB Dressing Assistance 4  Minimal Assistance   LB Dressing Deficit Don/doff R sock; Don/doff L sock; Thread RLE into pants; Thread LLE into pants   LB Dressing Comments pt instructed on and completed doff/donning socks with Long handle adaptive equipment later in session and able to complete task with SBA  Pt instructed on where to obtain LHAE /pt able to verbalize good understanding    Transfers   Sit to Stand 5  Supervision   Additional items Assist x 1   Stand to Sit 6  Modified independent   Functional Mobility   Functional Mobility 5  Supervision   Additional items Rolling walker   Cognition   Overall Cognitive Status Impaired   Arousal/Participation Alert; Responsive; Cooperative   Attention Attends with cues to redirect   Orientation Level Oriented X4   Memory Decreased recall of recent events;Decreased recall of precautions;Decreased short term memory   Following Commands Follows one step commands with increased time or repetition   Activity Tolerance   Activity Tolerance Patient tolerated treatment well   Assessment   Assessment Pt participated in occupational therapy with focus on activity tolerance, functional transfers/mob standing tolerance and unsupported standing balance and tolerance for pt engagement in functional self-care task/oral care and UB and LB self-care, energy conservation techniques  Pt cleared by RN/Lukasz for pt participation in Occupational  therapy  Pt received sitting out of bed to bedside chair and agreeable to therapy pt known to OT from previous treatment session  Pt reported his goal to return to home with his wife  Pt did require min cognitive support throughout session and Min assist for LB dressing 2* pt coordination and pt deconditioning  Pt however progressing well and reported that his wife would be able to assist him with LB dressing  Pt then stated " I would like to not have to relay on her though"  Pt instructed on energy conservation techniques and LB dressing techniques with pt able to verbalize understanding  Pt required assist for toilet transfers this session 2* pt deconditioning  Pt would benefit from 3-in-one bedside commode and pt case management informed of OT findings  Pt will benefit from Home OT services  Pt chair alarm active post session all needs within reach      Plan   Treatment Interventions ADL retraining   Goal Expiration Date 03/01/21   OT Treatment Day 3   OT Frequency 3-5x/wk   Recommendation   OT Discharge Recommendation Other (Comment)   AM-PAC Daily Activity Inpatient   Lower Body Dressing 2   Bathing 2   Toileting 3   Upper Body Dressing 3   Grooming 4   Eating 4   Daily Activity Raw Score 18   Daily Activity Standardized Score (Calc for Raw Score >=11) 38 66   AM-PAC Applied Cognition Inpatient   Following a Speech/Presentation 3   Understanding Ordinary Conversation 3   Taking Medications 3   Remembering Where Things Are Placed or Put Away 3   Remembering List of 4-5 Errands 3   Taking Care of Complicated Tasks 3   Applied Cognition Raw Score 18   Applied Cognition Standardized Score 38 07       Olinda NARANJOA/L

## 2021-02-25 NOTE — PLAN OF CARE
Problem: Potential for Falls  Goal: Patient will remain free of falls  Description: INTERVENTIONS:  - Assess patient frequently for physical needs  -  Identify cognitive and physical deficits and behaviors that affect risk of falls    -  [place] fall precautions as indicated by assessment   - Educate patient/family on patient safety including physical limitations  - Instruct patient to call for assistance with activity based on assessment  - Modify environment to reduce risk of injury  - Consider OT/PT consult to assist with strengthening/mobility  Outcome: Progressing     Problem: PAIN - ADULT  Goal: Verbalizes/displays adequate comfort level or baseline comfort level  Description: Interventions:  - Encourage patient to monitor pain and request assistance  - Assess pain using appropriate pain scale  - Administer analgesics based on type and severity of pain and evaluate response  - Implement non-pharmacological measures as appropriate and evaluate response  - Consider cultural and social influences on pain and pain management  - Notify physician/advanced practitioner if interventions unsuccessful or patient reports new pain  Outcome: Progressing     Problem: INFECTION - ADULT  Goal: Absence or prevention of progression during hospitalization  Description: INTERVENTIONS:  - Assess and monitor for signs and symptoms of infection  - Monitor lab/diagnostic results  - Monitor all insertion sites, i e  indwelling lines, tubes, and drains  - Monitor endotracheal if appropriate and nasal secretions for changes in amount and color  - [place] appropriate cooling/warming therapies per order  - Administer medications as ordered  - Instruct and encourage patient and family to use good hand hygiene technique  - Identify and instruct in appropriate isolation precautions for identified infection/condition  Outcome: Progressing     Problem: SAFETY ADULT  Goal: Patient will remain free of falls  Description: INTERVENTIONS:  - Assess patient frequently for physical needs  -  Identify cognitive and physical deficits and behaviors that affect risk of falls    -  [place] fall precautions as indicated by assessment   - Educate patient/family on patient safety including physical limitations  - Instruct patient to call for assistance with activity based on assessment  - Modify environment to reduce risk of injury  - Consider OT/PT consult to assist with strengthening/mobility  Outcome: Progressing  Goal: Maintain or return to baseline ADL function  Description: INTERVENTIONS:  -  Assess patient's ability to carry out ADLs; assess patient's baseline for ADL function and identify physical deficits which impact ability to perform ADLs (bathing, care of mouth/teeth, toileting, grooming, dressing, etc )  - Assess/evaluate cause of self-care deficits   - Assess range of motion  - Assess patient's mobility; develop plan if impaired  - Assess patient's need for assistive devices and provide as appropriate  - Encourage maximum independence but intervene and supervise when necessary  - Involve family in performance of ADLs  - Assess for home care needs following discharge   - Consider OT consult to assist with ADL evaluation and planning for discharge  - Provide patient education as appropriate  Outcome: Progressing  Goal: Maintain or return mobility status to optimal level  Description: INTERVENTIONS:  - Assess patient's baseline mobility status (ambulation, transfers, stairs, etc )    - Identify cognitive and physical deficits and behaviors that affect mobility  - Identify mobility aids required to assist with transfers and/or ambulation (gait belt, sit-to-stand, lift, walker, cane, etc )  - [place] fall precautions as indicated by assessment  - Record patient progress and toleration of activity level on Mobility SBAR; progress patient to next Phase/Stage  - Instruct patient to call for assistance with activity based on assessment  - Consider rehabilitation consult to assist with strengthening/weightbearing, etc   Outcome: Progressing     Problem: DISCHARGE PLANNING  Goal: Discharge to home or other facility with appropriate resources  Description: INTERVENTIONS:  - Identify barriers to discharge w/patient and caregiver  - Arrange for needed discharge resources and transportation as appropriate  - Identify discharge learning needs (meds, wound care, etc )  - Arrange for interpretive services to assist at discharge as needed  - Refer to Case Management Department for coordinating discharge planning if the patient needs post-hospital services based on physician/advanced practitioner order or complex needs related to functional status, cognitive ability, or social support system  Outcome: Progressing     Problem: Knowledge Deficit  Goal: Patient/family/caregiver demonstrates understanding of disease process, treatment plan, medications, and discharge instructions  Description: Complete learning assessment and assess knowledge base  Interventions:  - Provide teaching at level of understanding  - Provide teaching via preferred learning methods  Outcome: Progressing     Problem: Nutrition/Hydration-ADULT  Goal: Nutrient/Hydration intake appropriate for improving, restoring or maintaining nutritional needs  Description: Monitor and assess patient's nutrition/hydration status for malnutrition  Collaborate with interdisciplinary team and initiate plan and interventions as ordered  Monitor patient's weight and dietary intake as ordered or per policy  Utilize nutrition screening tool and intervene as necessary  Determine patient's food preferences and provide high-protein, high-caloric foods as appropriate       INTERVENTIONS:  - Monitor oral intake, urinary output, labs, and treatment plans  - Assess nutrition and hydration status and recommend course of action  - Evaluate amount of meals eaten  - Assist patient with eating if necessary   - Allow adequate time for meals  - Recommend/ encourage appropriate diets, oral nutritional supplements, and vitamin/mineral supplements  - Order, calculate, and assess calorie counts as needed  - Recommend, monitor, and adjust tube feedings and TPN/PPN based on assessed needs  - Assess need for intravenous fluids  - Provide specific nutrition/hydration education as appropriate  - Include patient/family/caregiver in decisions related to nutrition  Outcome: Progressing     Problem: Prexisting or High Potential for Compromised Skin Integrity  Goal: Skin integrity is maintained or improved  Description: INTERVENTIONS:  - Identify patients at risk for skin breakdown  - Assess and monitor skin integrity  - Assess and monitor nutrition and hydration status  - Monitor labs   - Assess for incontinence   - Turn and reposition patient  - Assist with mobility/ambulation  - Relieve pressure over bony prominences  - Avoid friction and shearing  - Provide appropriate hygiene as needed including keeping skin clean and dry  - Evaluate need for skin moisturizer/barrier cream  - Collaborate with interdisciplinary team   - Patient/family teaching  - Consider wound care consult   Outcome: Progressing

## 2021-02-25 NOTE — CASE MANAGEMENT
Met with pt and pt's wife Jaspal Barragan to discuss dc plan  Plan is for dc to home tomorrow 2/26; Jaspal Barragan to transport at [address] [place] [address] states they may be interested in a bedside commode  She is going to measure the bathroom tonight and inform CM tomorrow  Jaspal Barragan is aware to  drainage supplies at [facility] prior to pt's dc  IMM reviewed with patient's caregiver  patient's caregiver agree with discharge determination    Patient meets criteria for Care at Home services with Dr Jolly Khan acceptance  Requested attending consult Geriatrics  Notification to [facility]

## 2021-02-25 NOTE — PLAN OF CARE
Problem: PHYSICAL THERAPY ADULT  Goal: Performs mobility at highest level of function for planned discharge setting  See evaluation for individualized goals  Description: Treatment/Interventions: Functional transfer training, LE strengthening/ROM, Elevations, Therapeutic exercise, Endurance training, Cognitive reorientation, Patient/family training, Equipment eval/education, Bed mobility, Gait training, Spoke to nursing, Family  Equipment Recommended: Kyle Yanez       See flowsheet documentation for full assessment, interventions and recommendations  Outcome: Progressing  Note: Prognosis: Good  Problem List: Decreased strength, Decreased range of motion, Decreased endurance, Impaired balance, Decreased mobility, Decreased skin integrity, Pain  Assessment: Pt continues to make significant progress in functional mobility  Able to perform functional transfers, ambulation, and stair management without any physical assistance  Pt did require increased time to complete all mobility and required use of RW  Pt was able to negotiate 14 steps safely with 1 handrail and utilized step to pattern  Did note mild ELIAS after stair management  Spoke to patient's wife today and addressed all questions to the best of my ability  Educated pt and pt's wife on the importance of continue use of RW, continue with home PT/OPPT, home safety, and energy conservation  At this time, recommend home with home PT  Recommend continue use use and the following DME: shower chair and BSC  Barriers to Discharge: Inaccessible home environment     PT Discharge Recommendation: Home with skilled therapy(Home PT)     PT - OK to Discharge: Yes    See flowsheet documentation for full assessment

## 2021-02-25 NOTE — PROGRESS NOTES
Noted by Dr. Ryder: dc due to cost.    Progress Note - Nephrology   Moise Canavan [de-identified] y o  male MRN: [id number]  Unit/Bed#: [facility] 723-01 Encounter: [id number]    ASSESSMENT AND PLAN:  Kayla Acevedo is an [de-identified] M with PMH CAD, bladder cancer s/p ileal conduit, T2DM, HTN, CKD stage 3 who is here with abdomino-pelvic abscess  1  KO on CKD stage 3  Cr on presentation was 3 59  Has been improving slightly and is stable at 1 97 today  Baseline 1 2-1 6  Has been receiving fluids for suspected prerenal etiology  In setting of sepsis, ATN is a possibility  Was also initially oliguric on admission  With relative improvements with fluids suspect prerenal etiology  UA with 10-20 WBCs, 1+ protein, and coarse granular casts on 2/18/21  CT did not show hydronephrosis on 2/23/21    - Agree with holding ARB, BP well controlled  - Can d/c fluids as it seems as though there are no plans for IV contrast  Evidence of pleural effusions on most recent CT and patient appears euvolemic    - Avoid hypotension  - Avoid nephrotoxins if possible  ID initially requested contrast CT to evaluate for possible fistula  Ended up getting CT with PO contrast   - Continue unasyn dosing q12 for renal dosing  Creatinine clearance 34 today, so if transitioned to augmentin, will not require renal dosing      - Encouraged patient to continue drinking fluids at home if he is discharged today  2  Blood pressure  Acceptable in 150s-160s/70s-80s  - Continue to hold ARB     3  Electrolytes  Bicarb 20, normal AG, improving slightly  pH 7 35 today so not true acidosis  - Received sodium bicarb x2 overnight on 2/23/21     4  Anemia  Hgb in 8-9 range during admission  Baseline closer to 12  Could be dilutional, as patient has received a lot of fluids  Also could be in setting of acute infection   - Suspect likely dilutional, but with age, anemia, and worsening renal function, might consider ordering SPEP/UPEP for myeloma work-up  - Consider iron studies     5  Abdomino-pelvic abscess  Currently on unasyn  S/p IR drainage  No communication with bowel seen on imaging    - Management per primary team and ID      Subjective:   Patient says he feels good today  Denies fevers/chills overnight  Improved strength over the course of admission  No SOB, chest pain, N/V  Still having about 3 episodes of diarrhea per day  Reports there is talk of him being discharged today  Objective:     Vitals: Blood pressure 164/94, pulse 68, temperature 97 8 °F (36 6 °C), resp  rate 17, height 5' 8" (1 727 m), weight 80 7 kg (177 lb 14 6 oz), SpO2 96 %  ,Body mass index is 27 05 kg/m²      Weight (last 2 days)     Date/Time   Weight    02/24/21 0600   80 7 (177 91)                Intake/Output Summary (Last 24 hours) at 2/25/2021 0740  Last data filed at 2/25/2021 0540  Gross per 24 hour   Intake 630 ml   Output 2570 ml   Net -1940 ml            Physical Exam:   General:  No acute distress  Skin:  No acute rash  Eyes:  No scleral icterus and noninjected  ENT:  Moist mucous membranes  Neck:  Supple, no jugular venous distention, trachea midline, overall appearance is normal  Chest:  Clear to auscultation  CVS:  Systolic ejection murmur, without a rub or gallops  Abdomen:  Normal bowel sounds, soft and nontender and nondistended, ileal conduit stoma appear clear  Extremities:  No edema, and no cyanosis, no significant arthritic changes  Neuro:  No gross focality  Psych:  Alert and oriented and appropriate                Medications:    Scheduled Meds:  Current Facility-Administered Medications   Medication Dose Route Frequency Provider Last Rate    acetaminophen  650 mg Oral Q6H PRN Bre Nava MD      aluminum-magnesium hydroxide-simethicone  30 mL Oral Q6H PRN Bre Nava MD      ampicillin-sulbactam  3 g Intravenous Q8H Rorydemario Kelley MD 3 g (02/25/21 0147)    aspirin  81 mg Oral Daily Bre Nava MD      atorvastatin  40 mg Oral HS Bre Nava MD      bisacodyl  10 mg Rectal Daily PRN Scott Red MD      cholecalciferol  2,000 Units Oral Daily Gerhardt Norton, MD      citalopram  20 mg Oral Daily Gerhardt Norton, MD      docusate sodium  100 mg Oral BID Scott Red MD      fish oil  1,000 mg Oral Daily Gerhardt Norton, MD      fluticasone  2 spray Nasal Daily Gerhardt Norton, MD      heparin (porcine)  5,000 Units Subcutaneous [facility] Gerhardt Norton, MD      insulin lispro  1-5 Units Subcutaneous TID [facility] Gerhardt Norton, MD      insulin lispro  1-5 Units Subcutaneous HS Gerhardt Norton, MD      metoprolol tartrate  50 mg Oral BID Scott Red MD      multivitamin-minerals  1 tablet Oral Daily Gerhardt Norton, MD      nystatin   Topical TID Gerhardt Norton, MD      ondansetron  4 mg Intravenous Q6H PRN Gerhardt Norton, MD      pantoprazole  40 mg Oral Early Morning Gerhardt Norton, MD      polyethylene glycol  17 g Oral Daily Scott Red MD      senna  1 tablet Oral BID Scott Red MD      sodium chloride  1 spray Each Nare Q1H PRN Scott Red MD      traMADol  50 mg Oral Q6H PRN Gerhardt Norton, MD      vitamin E (tocopherol)  400 Units Oral Daily Gerhardt Norton, MD         PRN Meds:   acetaminophen    aluminum-magnesium hydroxide-simethicone    bisacodyl    ondansetron    sodium chloride    traMADol    Continuous Infusions:     Lab, Imaging and other studies: I have personally reviewed pertinent labs    Laboratory Results:  Results from last 7 days   Lab Units 02/25/21  0648 02/24/21  0530 02/23/21  0449 02/22/21  0529 02/21/21  0536 02/20/21  0513 02/19/21  0857 02/18/21  1126   WBC Thousand/uL 16 63* 16 17* 13 90* 14 35* 13 46* 15 26* 18 01* 17 34*   HEMOGLOBIN g/dL 9 2* 8 7* 9 3* 9 2* 8 7* 9 2* 9 3* 9 5*   HEMATOCRIT % 28 7* 27 1* 28 4* 28 5* 26 1* 28 2* 28 2* 29 5*   PLATELETS Thousands/uL 235 221 235 230 212 197 188 183   POTASSIUM mmol/L 4 8 4 9 4 5 5 0 4 2 4 3 4 3 4 4   CHLORIDE mmol/L 116* 115* 112* 114* 112* 113* 110* 111*   CO2 mmol/L 20* 19* 18* 18* 14* 13* 14* 13*   BUN mg/dL 23 26* 33* 41* 47* 50* 57* 66*   CREATININE mg/dL 1 97* 1 94* 2 12* 2 40* 2 70* 2 85* 3 21* 3 33*   CALCIUM mg/dL 8 6 8 3 8 3 8 0* 7 5* 7 7* 7 7* 7 9*   MAGNESIUM mg/dL  --   --  1 8 2 0 2 0 2 1 0 9* 0 7*   PHOSPHORUS mg/dL  --   --  2 4 1 9* 1 8* 2 9 1 7* 1 6*     Urinalysis:   Lab Results   Component Value Date    COLORU Yellow 02/18/2021    COLORU Yellow 10/10/2017    CLARITYU Clear 02/18/2021    CLARITYU Transparent 10/10/2017    SPECGRAV 1 009 02/18/2021    SPECGRAV <=1 005 10/10/2017    PHUR 7 0 02/18/2021    PHUR 5 5 05/24/2018    PHUR 5 5 10/10/2017    LEUKOCYTESUR Large (A) 02/18/2021    LEUKOCYTESUR Negative 10/10/2017    NITRITE Negative 02/18/2021    NITRITE Negative 10/10/2017    PROTEINUA Negative 10/10/2017    GLUCOSEU Negative 02/18/2021    GLUCOSEU Negative 07/14/2014    KETONESU Negative 02/18/2021    KETONESU Negative 10/10/2017    BILIRUBINUR Negative 02/18/2021    BILIRUBINUR Negative 10/10/2017    BLOODU Small (A) 02/18/2021    BLOODU Negative 10/10/2017     ABGs: No results found for: [facility]  Radiology review:   IR drainage tube check/change/reposition/reinsertion/upsize   Final Result by Mk Ballard MD (02/24 1533)   Impression:       Successful catheter exchange  Complex residual cavity without a communication to bowel  However, a repeat study should be performed once the cavity has resolved to exclude a fistula  Workstation performed: [id number]         CT abdomen pelvis wo contrast   Final Result by Ja Nash MD (02/23 1919)      1  Pigtail catheter in previously seen right anterior pelvic wall collection, the previously seen foci of gas are no longer seen    There is persistent soft tissue thickening about the pigtail catheter measuring up to 6 6 x 4 6 cm which may be due to    scarring however evaluation for fluid collection is limited due to lack of intravenous contrast    2   Small bilateral pleural effusions and bilateral flank edema likely due to volume overload  3   Other findings as above  Workstation performed: [id number]         CT guided perc drainage catheter placeme   Final Result by Keisha Gomez MD (02/19 1742)   Impression:       CT and ultrasound-guided drain placement x2:   Inferior 8-Greek Amplatz anchor drain placed into a small linear collection containing thin yellow purulent fluid      Superior 10-Greek Elder-Zhao drain placed into a air and debris filled cavity in the right abdominal wall with 60 mL of thick viscous pus      Plan:       Flush the 8-Greek inferior catheter with 3 mL daily   Flush the 10-Greek superior catheter with 5 mL twice daily   Monitor culture results         Workstation performed: [id number]         XR loopogram   Final Result by Shazia Moncada MD (02/19 5823)      Normal fluoroscopic loopogram demonstrating free reflux of contrast into the ureters bilaterally  No extravasated contrast   No communication of the ileal loop with the anterior pelvic collection seen on the prior CT  Workstation performed: [id number]         XR chest 1 view portable   Final Result by Donya Rosales MD (02/18 0044)      No acute cardiopulmonary disease  Workstation performed: [id number]         CT abdomen pelvis wo contrast   Final Result by Emma Sheffield DO (02/17 5183)      Postsurgical changes in the pelvis, status post ileal conduit  Masslike appearance near the small bowel anastomosis and incisional scarring in the right hemipelvis  There is an infiltrative appearance of the surrounding soft tissues  This could be related to scar tissue and fibrosis with adherent bowel loops and    associated collapse, although an associated mass (area measures approximately 4 5 x 5 8 x 5 0 cm in size) could be benign (such as a desmoid tumor) or malignant also considered  An abscess in this region is also considered    (Axial image 72, series 2)      Midline incisional scarring with body wall edema and some infiltration of the fat in the anterior pelvic wall which could be edema versus cellulitis depending on the clinical setting  No hydronephrosis  Coronary atherosclerosis, cholelithiasis without discrete evidence of acute cholecystitis, and other findings as above           Workstation performed: [id number]         IR drainage tube check and/or removal    (Results Pending)

## 2021-02-26 VITALS
HEART RATE: 92 BPM | OXYGEN SATURATION: 96 % | DIASTOLIC BLOOD PRESSURE: 84 MMHG | HEIGHT: 68 IN | BODY MASS INDEX: 27.2 KG/M2 | RESPIRATION RATE: 18 BRPM | SYSTOLIC BLOOD PRESSURE: 126 MMHG | WEIGHT: 179.45 LBS | TEMPERATURE: 97.8 F

## 2021-02-26 PROBLEM — E87.2 NORMAL ANION GAP METABOLIC ACIDOSIS: Status: RESOLVED | Noted: 2021-02-20 | Resolved: 2021-02-26

## 2021-02-26 PROBLEM — E87.20 NORMAL ANION GAP METABOLIC ACIDOSIS: Status: RESOLVED | Noted: 2021-02-20 | Resolved: 2021-02-26

## 2021-02-26 PROBLEM — E87.8 ELECTROLYTE DISTURBANCE: Status: RESOLVED | Noted: 2021-02-19 | Resolved: 2021-02-26

## 2021-02-26 LAB
ERYTHROCYTE [DISTWIDTH] IN BLOOD BY AUTOMATED COUNT: 15.6 % (ref 11.6–15.1)
GLUCOSE SERPL-MCNC: 105 MG/DL (ref 65–140)
GLUCOSE SERPL-MCNC: 195 MG/DL (ref 65–140)
HCT VFR BLD AUTO: 30.3 % (ref 36.5–49.3)
HGB BLD-MCNC: 9.3 G/DL (ref 12–17)
MCH RBC QN AUTO: 31.5 PG (ref 26.8–34.3)
MCHC RBC AUTO-ENTMCNC: 30.7 G/DL (ref 31.4–37.4)
MCV RBC AUTO: 103 FL (ref 82–98)
NRBC BLD AUTO-RTO: 0 /100 WBCS
PLATELET # BLD AUTO: 232 THOUSANDS/UL (ref 149–390)
PMV BLD AUTO: 11.4 FL (ref 8.9–12.7)
RBC # BLD AUTO: 2.95 MILLION/UL (ref 3.88–5.62)
WBC # BLD AUTO: 17 THOUSAND/UL (ref 4.31–10.16)

## 2021-02-26 PROCEDURE — 85027 COMPLETE CBC AUTOMATED: CPT | Performed by: INTERNAL MEDICINE

## 2021-02-26 PROCEDURE — 99239 HOSP IP/OBS DSCHRG MGMT >30: CPT | Performed by: INTERNAL MEDICINE

## 2021-02-26 PROCEDURE — 82948 REAGENT STRIP/BLOOD GLUCOSE: CPT

## 2021-02-26 RX ORDER — AMOXICILLIN AND CLAVULANATE POTASSIUM 875; 125 MG/1; MG/1
1 TABLET, FILM COATED ORAL EVERY 12 HOURS SCHEDULED
Qty: 6 TABLET | Refills: 0 | Status: SHIPPED | OUTPATIENT
Start: 2021-02-26 | End: 2021-03-01

## 2021-02-26 RX ADMIN — AMPICILLIN SODIUM AND SULBACTAM SODIUM 3 G: 2; 1 INJECTION, POWDER, FOR SOLUTION INTRAMUSCULAR; INTRAVENOUS at 10:21

## 2021-02-26 RX ADMIN — ASPIRIN 81 MG: 81 TABLET, CHEWABLE ORAL at 10:15

## 2021-02-26 RX ADMIN — Medication 1 TABLET: at 10:15

## 2021-02-26 RX ADMIN — CITALOPRAM HYDROBROMIDE 20 MG: 20 TABLET ORAL at 10:13

## 2021-02-26 RX ADMIN — METOPROLOL TARTRATE 50 MG: 25 TABLET, FILM COATED ORAL at 10:15

## 2021-02-26 RX ADMIN — INSULIN LISPRO 1 UNITS: 100 INJECTION, SOLUTION INTRAVENOUS; SUBCUTANEOUS at 12:24

## 2021-02-26 RX ADMIN — HEPARIN SODIUM 5000 UNITS: 5000 INJECTION INTRAVENOUS; SUBCUTANEOUS at 05:51

## 2021-02-26 RX ADMIN — AMPICILLIN SODIUM AND SULBACTAM SODIUM 3 G: 2; 1 INJECTION, POWDER, FOR SOLUTION INTRAMUSCULAR; INTRAVENOUS at 01:44

## 2021-02-26 RX ADMIN — PANTOPRAZOLE SODIUM 40 MG: 40 TABLET, DELAYED RELEASE ORAL at 05:51

## 2021-02-26 RX ADMIN — Medication 2000 UNITS: at 10:15

## 2021-02-26 RX ADMIN — Medication 400 UNITS: at 10:13

## 2021-02-26 NOTE — CASE MANAGEMENT
Spoke with pt's wife Octavio Mendez to discuss bedside commode  Octavio Mendez state she will research and potentially purchase one on her own  She does not feel that the standard bedside commode will fit over her toilet at home

## 2021-02-26 NOTE — ASSESSMENT & PLAN NOTE
· POA with leukocytosis and tachycardia  · Likely source right pelvic abscess  ·  Known h/o radical cysto prostatectomy with ileal conduit at [facility]  · Blood cultures negative at 4 days  · UA relatively unremarkable for significant infection  · Procalcitonin elevated at 0 9>> 0 76>> 0;4, Leukocytosis trending down as well  · CT abdomen pelvis concerning for anterior abdominal wall cellulitis and/ or abscess in surgical area  · Status post IR guided drainage and drain placement of a right pelvic abscess on 2/19  · Drainage Cultures from IR are growing E coli and Klebsiella so far pending susceptibilities  Gram-positive rods also noted  PLAN:  · recieved IV cefepime Flagyl, day 5, changed to Unasyn by ID day 3  · Will complete augmantin starting tomorrow complete on 3/1/2021  · Follow blood culture and urine culture grew different strains   · Concern for fistula, CT abdomen and pelvis without contrast ordered with po only contrast to see if there is any fistula  · procalcitonin neg  · Urology, nephrology, ID on board appreciate input  · Trend CBC  · Went to IR today for drain adjustment, CT scan from 2/23 did not show leak of fluid from GI tract, so, less likely fistula  No need for contrast CT for now  · Renally improved

## 2021-02-26 NOTE — ASSESSMENT & PLAN NOTE
radical cysto prostatectomy with ileal conduit at [facility]  Urology on board  Rest As above

## 2021-02-26 NOTE — ASSESSMENT & PLAN NOTE
Lab Results   Component Value Date    HGBA1C 6 0 (H) 02/18/2021     Hemoglobin A1c; glucose checks per protocol with insulin sliding scale    Recent Labs     02/24/21  2120 02/25/21  0713 02/25/21  1110 02/25/21  1654   POCGLU 201* 101 166* 140       Blood Sugar Average: Last 72 hrs:  (P) 001 [id number]     iss and accucheks  Hypoglycemia protocol

## 2021-02-26 NOTE — PROGRESS NOTES
Progress Note - Don Flores 1940, [de-identified] y o  male MRN: [id number]    Unit/Bed#: [facility] 723-01 Encounter: [id number]    Primary Care Provider: Fatoumata Wasserman DO   Date and time admitted to hospital: 2/17/2021  8:52 PM        * Sepsis [facility])  Assessment & Plan  · POA with leukocytosis and tachycardia  · Likely source right pelvic abscess  ·  Known h/o radical cysto prostatectomy with ileal conduit at [facility]  · Blood cultures negative at 4 days  · UA relatively unremarkable for significant infection  · Procalcitonin elevated at 0 9>> 0 76>> 0;4, Leukocytosis trending down as well  · CT abdomen pelvis concerning for anterior abdominal wall cellulitis and/ or abscess in surgical area  · Status post IR guided drainage and drain placement of a right pelvic abscess on 2/19  · Drainage Cultures from IR are growing E coli and Klebsiella so far pending susceptibilities  Gram-positive rods also noted  PLAN:  · recieved IV cefepime Flagyl, day 5, changed to Unasyn by ID day 3  · Will complete augmantin starting tomorrow complete on 3/1/2021  · Follow blood culture and urine culture grew different strains   · Concern for fistula, CT abdomen and pelvis without contrast ordered with po only contrast to see if there is any fistula  · procalcitonin neg  · Urology, nephrology, ID on board appreciate input  · Trend CBC  · Went to IR today for drain adjustment, CT scan from 2/23 did not show leak of fluid from GI tract, so, less likely fistula  No need for contrast CT for now  · Renally improved  low HCO3 with normal Anion gap  Assessment & Plan  Likely related to hyperchloremia, nephrology ordered bicarb  Noted underlying KO  Monitor for now    Electrolyte disturbance  Assessment & Plan  Severe hypomagnesemia and hypophosphatemia likely secondary to poor p o  Intake and malnutrition  Improving    Trend and replete as needed    Severe protein-calorie malnutrition [facility])  Assessment & Plan  Malnutrition Findings:   Adult Malnutrition type: Chronic illness(related to disease/condition <75% energy intake versus neeeds for > 1 month as evidenced by 42#(21%) wt loss since 5/11/20 Treating with CCD3 diet & Glucerna BID as po supplement )  Adult Degree of Malnutrition: Other severe protein calorie malnutrition    BMI Findings: Body mass index is 27 05 kg/m²  Nutrition consult and supplements  Right sided Pelvic abscess in male [facility])  Assessment & Plan  Now status post IR guided drain placement on 02/19  Continue IV antibiotics and po as above  Rest as above  Urology outpatient follow up arranged    Anxiety and depression  Assessment & Plan  Continue Celexa 20 mg daily  Elevated troponin  Assessment & Plan  Likely non MI elevation due to above  No chest discomfort and troponin trending down  History of bladder cancer  Assessment & Plan  radical cysto prostatectomy with ileal conduit at [facility]  Urology on board  Rest As above    Type 2 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 [facility])  Assessment & Plan  Lab Results   Component Value Date    HGBA1C 6 0 (H) 02/18/2021     Hemoglobin A1c; glucose checks per protocol with insulin sliding scale  Recent Labs     02/24/21  2120 02/25/21  0713 02/25/21  1110 02/25/21  1654   POCGLU 201* 101 166* 140       Blood Sugar Average: Last 72 hrs:  (P) 562 [id number]     iss and accucheks  Hypoglycemia protocol      Acute kidney injury ([facility] [address] )  Assessment & Plan  Baseline creatinine between 1-2  POA with creatinine of 3 59  I suspect this is likely related to volume depletion and sepsis could be contributing along with ARB use  Slowly improving, down to 2 4 today  No hydronephrosis on CT scan  UA shows 0-3 fine and coarse granular cast with some proteinuria and mild hematuria    Continue IV hydration  Hold ARB  Nephrology on board     Hyperlipidemia  Assessment & Plan  Atorvastatin 40 mg to continue    Benign essential hypertension  Assessment & Plan  Continue metoprolol 50 mg b i d  With holding parameters  ARB on hold due to acute kidney injury  VTE Pharmacologic Prophylaxis:   Pharmacologic: Heparin  Mechanical VTE Prophylaxis in Place: Yes    Patient Centered Rounds: I have performed bedside rounds with nursing staff today  Discussions with Specialists or Other Care Team Provider: ID, Nephrology and urology, IR regarding discharge instructions  His flushes are ordered to home star  Education and Discussions with Family / Patient: patient     Time Spent for Care: 45 minutes  More than 50% of total time spent on counseling and coordination of care as described above  Current Length of Stay: 8 day(s)    Current Patient Status: Inpatient   Certification Statement: The patient will continue to require additional inpatient hospital stay due to to complete iv antibiotics, drain work up    Discharge Plan: discharge tomorrow, wife can pick him up at 1 pm     Code Status: Level 1 - Full Code      Subjective:   Patient is ready to go today  He says he has no complains today  No overnight events  Objective:     Vitals:   Temp (24hrs), Av 1 °F (36 7 °C), Min:97 8 °F (36 6 °C), Max:98 3 °F (36 8 °C)    Temp:  [97 8 °F (36 6 °C)-98 3 °F (36 8 °C)] 98 2 °F (36 8 °C)  HR:  [56-68] 61  Resp:  [17-18] 18  BP: (153-166)/(86-95) 153/86  SpO2:  [95 %-99 %] 99 %  Body mass index is 27 05 kg/m²  Input and Output Summary (last 24 hours): Intake/Output Summary (Last 24 hours) at 2021  Last data filed at 2021 1701  Gross per 24 hour   Intake 800 ml   Output 1870 ml   Net -1070 ml       Physical Exam:     Physical Exam  Vitals signs and nursing note reviewed  Constitutional:       General: He is not in acute distress  Appearance: He is normal weight  He is not ill-appearing  HENT:      Head: Normocephalic and atraumatic  Nose: Nose normal  No congestion     Eyes:      Extraocular Movements: Extraocular movements intact  Conjunctiva/sclera: Conjunctivae normal       Pupils: Pupils are equal, round, and reactive to light  Neck:      Musculoskeletal: Normal range of motion  Cardiovascular:      Rate and Rhythm: Normal rate  Heart sounds: No murmur  No friction rub  Pulmonary:      Effort: Pulmonary effort is normal    Abdominal:      General: Abdomen is flat  Bowel sounds are normal  There is no distension  Palpations: Abdomen is soft  There is no mass  Tenderness: There is no abdominal tenderness  Hernia: No hernia is present  Musculoskeletal: Normal range of motion  General: No swelling, tenderness, deformity or signs of injury  Skin:     General: Skin is warm and dry  Coloration: Skin is not jaundiced or pale  Findings: No bruising or erythema  Neurological:      General: No focal deficit present  Mental Status: He is alert  Mental status is at baseline  He is disoriented  Cranial Nerves: No cranial nerve deficit  Psychiatric:         Mood and Affect: Mood normal          Behavior: Behavior normal          Thought Content: Thought content normal          Judgment: Judgment normal          Additional Data:     Labs:    Results from last 7 days   Lab Units 02/25/21  0648  02/20/21  0513   WBC Thousand/uL 16 63*   < > 15 26*   HEMOGLOBIN g/dL 9 2*   < > 9 2*   HEMATOCRIT % 28 7*   < > 28 2*   PLATELETS Thousands/uL 235   < > 197   NEUTROS PCT %  --   --  76*   LYMPHS PCT %  --   --  8*   LYMPHO PCT % 6*   < >  --    MONOS PCT %  --   --  10   MONO PCT % 4   < >  --    EOS PCT % 3   < > 4    < > = values in this interval not displayed       Results from last 7 days   Lab Units 02/25/21  0648   POTASSIUM mmol/L 4 8   CHLORIDE mmol/L 116*   CO2 mmol/L 20*   BUN mg/dL 23   CREATININE mg/dL 1 97*   CALCIUM mg/dL 8 6     Results from last 7 days   Lab Units 02/19/21  1209   INR  1 25*       * I Have Reviewed All Lab Data Listed Above   * Additional Pertinent Lab Tests Reviewed:  [address] Admission Reviewed    Imaging:    Imaging Reports Reviewed Today Include:    Imaging Personally Reviewed by Myself Includes:      Recent Cultures (last 7 days):     Results from last 7 days   Lab Units 02/19/21  1637 02/19/21  1548   GRAM STAIN RESULT  4+ Gram variable rods*  4+ Disintegrating polys* 3+ Gram positive rods*  Rare Gram negative rods*  4+ Disintegrating polys*   BODY FLUID CULTURE, STERILE  4+ Growth of Escherichia coli*  Few Colonies of Klebsiella oxytoca*  4+ Growth of Lactobacillus species* 4+ Growth of Lactobacillus species*  Growth in Broth culture only Staphylococcus aureus*  Growth in Broth culture only Escherichia coli*       Last 24 Hours Medication List:   Current Facility-Administered Medications   Medication Dose Route Frequency Provider Last Rate    acetaminophen  650 mg Oral Q6H PRN Sharmin Logan MD      aluminum-magnesium hydroxide-simethicone  30 mL Oral Q6H PRN Sharmin Logan MD      ampicillin-sulbactam  3 g Intravenous Q8H Rory ZeMD kasandra 3 g (02/25/21 1813)    aspirin  81 mg Oral Daily Sharmin Logan MD      atorvastatin  40 mg Oral HS Sharmin Logan MD      bisacodyl  10 mg Rectal Daily PRN Collin Moura MD      cholecalciferol  2,000 Units Oral Daily Sharmin Logan MD      citalopram  20 mg Oral Daily Sharmin Logan MD      docusate sodium  100 mg Oral BID Collin Moura MD      fish oil  1,000 mg Oral Daily Sharmin Logan MD      fluticasone  2 spray Nasal Daily Sharmin Logan MD      heparin (porcine)  5,000 Units Subcutaneous [facility] [address] Sharmin Logan MD      insulin lispro  1-5 Units Subcutaneous TID [facility] Sharmin Logan MD      insulin lispro  1-5 Units Subcutaneous HS Sharmin Logan MD      metoprolol tartrate  50 mg Oral BID Collin Moura MD      multivitamin-minerals  1 tablet Oral Daily Sharmin Logan MD      nystatin   Topical TID Ninoska Jin Lyric Kruger MD      ondansetron  4 mg Intravenous Q6H PRN Brant Kaur MD      pantoprazole  40 mg Oral Early Morning Brant Kaur MD      polyethylene glycol  17 g Oral Daily Ema Miranda MD      senna  1 tablet Oral BID Ema Miranda MD      sodium chloride  1 spray Each Nare Q1H PRN Ema Miranda MD      traMADol  50 mg Oral Q6H PRN Brant Kaur MD      vitamin E (tocopherol)  400 Units Oral Daily Brant Kaur MD          Today, Patient Was Seen By: Araseli Lamar MD    ** Please Note: Dictation voice to text software may have been used in the creation of this document   **

## 2021-02-26 NOTE — ASSESSMENT & PLAN NOTE
Now status post IR guided drain placement on 02/19  Continue IV antibiotics and po as above  Rest as above    Urology outpatient follow up arranged

## 2021-02-28 NOTE — ASSESSMENT & PLAN NOTE
Lab Results   Component Value Date    HGBA1C 6 0 (H) 02/18/2021     Hemoglobin A1c; glucose checks per protocol with insulin sliding scale    Recent Labs     02/25/21  1654 02/25/21 2053 02/26/21  0639 02/26/21  1052   POCGLU 140 191* 105 195*       Blood Sugar Average: Last 72 hrs:  (P) 262 [id number]     iss and accucheks  Hypoglycemia protocol

## 2021-02-28 NOTE — ASSESSMENT & PLAN NOTE
radical cysto prostatectomy with ileal conduit at [facility]  Urology on board  Rest As above

## 2021-02-28 NOTE — DISCHARGE SUMMARY
Discharge- Vickie Bone 1940, [de-identified] y o  male MRN: [id number]    [facility] En[facility]: [id number]    Primary Care Provider: Ciara Littlejohn DO   Date and time admitted to hospital: 2/17/2021  8:52 PM        * Sepsis [facility])  Assessment & Plan  · POA with leukocytosis and tachycardia  · Likely source right pelvic abscess  ·  Known h/o radical cysto prostatectomy with ileal conduit at [facility]  · Blood cultures negative at 4 days  · UA relatively unremarkable for significant infection  · Procalcitonin elevated at 0 9>> 0 76>> 0;4, Leukocytosis trending down as well  · CT abdomen pelvis concerning for anterior abdominal wall cellulitis and/ or abscess in surgical area  · Status post IR guided drainage and drain placement of a right pelvic abscess on 2/19  · Drainage Cultures from IR are growing E coli and Klebsiella so far pending susceptibilities  Gram-positive rods also noted  PLAN:  · recieved IV cefepime Flagyl, day 5, changed to Unasyn by ID day 3  · Will complete augmantin starting tomorrow complete on 3/1/2021  · Follow blood culture and urine culture grew different strains   · Concern for fistula, CT abdomen and pelvis without contrast ordered with po only contrast to see if there is any fistula  · procalcitonin neg  · Urology, nephrology, ID on board appreciate input  · Trend CBC  · Went to IR today for drain adjustment, CT scan from 2/23 did not show leak of fluid from GI tract, so, less likely fistula  No need for contrast CT for now  · Renally improved  Severe protein-calorie malnutrition ([facility] [address] )  Assessment & Plan  Malnutrition Findings:   Adult Malnutrition type: Chronic illness(related to disease/condition <75% energy intake versus neeeds for > 1 month as evidenced by 42#(21%) wt loss since 5/11/20 Treating with CCD3 diet & Glucerna BID as po supplement )  Adult Degree of Malnutrition: Other severe protein calorie malnutrition    BMI Findings:         Body mass index is 27 29 kg/m²  Nutrition consult and supplements  Right sided Pelvic abscess in male [facility])  Assessment & Plan  Now status post IR guided drain placement on 02/19  Continue IV antibiotics and po as above  Rest as above  Urology outpatient follow up arranged    Anxiety and depression  Assessment & Plan  Continue Celexa 20 mg daily  Elevated troponin  Assessment & Plan  Likely non MI elevation due to above  No chest discomfort and troponin trending down  History of bladder cancer  Assessment & Plan  radical cysto prostatectomy with ileal conduit at [facility]  Urology on board  Rest As above    Type 2 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 [facility])  Assessment & Plan  Lab Results   Component Value Date    HGBA1C 6 0 (H) 02/18/2021     Hemoglobin A1c; glucose checks per protocol with insulin sliding scale  Recent Labs     02/25/21  1654 02/25/21  2053 02/26/21  0639 02/26/21  1052   POCGLU 140 191* 105 195*       Blood Sugar Average: Last 72 hrs:  (P) 469 [id number]     iss and accucheks  Hypoglycemia protocol      Acute kidney injury ([facility] [address] )  Assessment & Plan  Baseline creatinine between 1-2  POA with creatinine of 3 59  I suspect this is likely related to volume depletion and sepsis could be contributing along with ARB use  Slowly improving, down to 2 4 today  No hydronephrosis on CT scan  UA shows 0-3 fine and coarse granular cast with some proteinuria and mild hematuria  Continue IV hydration  Hold ARB  Nephrology on board     Hyperlipidemia  Assessment & Plan  Atorvastatin 40 mg to continue    Benign essential hypertension  Assessment & Plan  Continue metoprolol 50 mg b i d  With holding parameters  ARB on hold due to acute kidney injury      low HCO3 with normal Anion gap-resolved as of 2/26/2021  Assessment & Plan  Likely related to hyperchloremia, nephrology ordered bicarb  Noted underlying KO  Monitor for now    Electrolyte disturbance-resolved as of 2/26/2021  Assessment & Plan  Severe hypomagnesemia and hypophosphatemia likely secondary to poor p o  Intake and malnutrition  Improving  Trend and replete as needed             Discharging Physician / Practitioner: Rene Pinon MD  PCP: Mara Valencia DO  Admission Date:   Admission Orders (From admission, onward)     Ordered        02/17/21 2342  Inpatient Admission  Once                   Discharge Date:2/26/2021    Resolved Problems  Date Reviewed: 2/28/2021          Resolved    Electrolyte disturbance 2/26/2021     Resolved by  Rene Pinon MD    low HCO3 with normal Anion gap 2/26/2021     Resolved by  Rene Pinon MD          Consultations During Hospital Stay:  ·  nephrology, ID, urology    Procedures Performed:   ·  Blood cultures negative at 4 days  · UA relatively unremarkable for significant infection  · Procalcitonin elevated at 0 9>> 0 76>> 0;4, Leukocytosis trending down as well  · CT abdomen pelvis concerning for anterior abdominal wall cellulitis and/ or abscess in surgical area  · Status post IR guided drainage and drain placement of a right pelvic abscess on 2/19  · Drainage Cultures from IR are growing E coli and Klebsiella so far pending susceptibilities  Gram-positive rods also noted  · KO   · Troponin elevation   · YARIEL score of 4    Significant Findings / Test Results:   · As above    Incidental Findings:   ·  none     Test Results Pending at Discharge (will require follow up): · None      Outpatient Tests Requested:  · BMP 1-2 weeks    Complications:  None     Reason for Admission: abdominal mass/vomiting    Hospital Course:     Rasta Lacy is a [de-identified] y o  male patient who originally presented to the hospital on 2/17/2021 due to vomiting and abdominal mass noted for 2 days prior to admission on 2/17  He has history of bladder cancer, ileal conduit done at [facility] October of last year   He has CT abd/pelvis, showed cellulitis and abscess of anterior abdominal wall, near the surgery  Concern for fistula formation CT abdomen and pelvis without contrast obtained, however, po contrast did not leak into the abdominal cavity  HE was placed on cefepime and flagyl for 5 days, then he was seen by ID, they have changed his antibiotics as drain grew E  Coli and Klebsiella he was placed on unasyn  He was discharged on total of 10 days of antibiotics, he is on augmenting will complete on 3/1/2021 per ID  He has elevated wbc count, would need to follow outpatient  He needs to be followed by nursing for wound care and drainage care  Instructions for drains were written by IR, they gave flushes to Luis MORALES  He has low bicarb, hyperchloremia, he was on bicard with resolution  He was noted to have KO, which improved and we are holding ARBs  He has hypomagnesemia and hypophosphatemia likely secondary to poor P O  intake and malnutrition  He is severe protein calorie malnutrition as noted above  Please see above list of diagnoses and related plan for additional information  Condition at Discharge: stable     Discharge Day Visit / Exam:     * Please refer to separate progress note for these details *    Discussion with Family: wife on the phone regarding discharge     Discharge instructions/Information to patient and family:   See after visit summary for information provided to patient and family  Provisions for Follow-Up Care:  See after visit summary for information related to follow-up care and any pertinent home health orders  Disposition:     Home with VNA Services (Reminder: Complete face to face encounter)    For Discharges to [facility] SNF:   · Not Applicable to this Patient - Not Applicable to this Patient    Planned Readmission: no      Discharge Statement:  I spent 45 minutes discharging the patient  This time was spent on the day of discharge  I had direct contact with the patient on the day of discharge   Greater than 50% of the total time was spent examining patient, answering all patient questions, arranging and discussing plan of care with patient as well as directly providing post-discharge instructions  Additional time then spent on discharge activities  Discharge Medications:  See after visit summary for reconciled discharge medications provided to patient and family        ** Please Note: This note has been constructed using a voice recognition system **

## 2021-02-28 NOTE — ASSESSMENT & PLAN NOTE
Malnutrition Findings:   Adult Malnutrition type: Chronic illness(related to disease/condition <75% energy intake versus neeeds for > 1 month as evidenced by 42#(21%) wt loss since 5/11/20 Treating with CCD3 diet & Glucerna BID as po supplement )  Adult Degree of Malnutrition: Other severe protein calorie malnutrition    BMI Findings: Body mass index is 27 29 kg/m²  Nutrition consult and supplements

## 2021-02-28 NOTE — ASSESSMENT & PLAN NOTE
· POA with leukocytosis and tachycardia  · Likely source right pelvic abscess  ·  Known h/o radical cysto prostatectomy with ileal conduit at [facility]  · Blood cultures negative at 4 days  · UA relatively unremarkable for significant infection  · Procalcitonin elevated at 0 9>> 0 76>> 0;4, Leukocytosis trending down as well  · CT abdomen pelvis concerning for anterior abdominal wall cellulitis and/ or abscess in surgical area  · Status post IR guided drainage and drain placement of a right pelvic abscess on 2/19  · Drainage Cultures from IR are growing E coli and Klebsiella so far pending susceptibilities  Gram-positive rods also noted  PLAN:  · recieved IV cefepime Flagyl, day 5, changed to Unasyn by ID day 3  · Will complete augmantin starting tomorrow complete on 3/1/2021  · Follow blood culture and urine culture grew different strains   · Concern for fistula, CT abdomen and pelvis without contrast ordered with po only contrast to see if there is any fistula  · procalcitonin neg  · Urology, nephrology, ID on board appreciate input  · Trend CBC  · Went to IR today for drain adjustment, CT scan from 2/23 did not show leak of fluid from GI tract, so, less likely fistula  No need for contrast CT for now  · Renally improved

## 2021-03-01 ENCOUNTER — TRANSITIONAL CARE MANAGEMENT (OUTPATIENT)
Dept: INTERNAL MEDICINE CLINIC | Facility: CLINIC | Age: 81
End: 2021-03-01

## 2021-03-01 ENCOUNTER — TELEPHONE (OUTPATIENT)
Dept: NEPHROLOGY | Facility: CLINIC | Age: 81
End: 2021-03-01

## 2021-03-01 ENCOUNTER — TELEPHONE (OUTPATIENT)
Dept: UROLOGY | Facility: AMBULATORY SURGERY CENTER | Age: 81
End: 2021-03-01

## 2021-03-01 DIAGNOSIS — I10 BENIGN ESSENTIAL HYPERTENSION: Primary | ICD-10-CM

## 2021-03-01 DIAGNOSIS — N18.32 STAGE 3B CHRONIC KIDNEY DISEASE (HCC): ICD-10-CM

## 2021-03-01 NOTE — TELEPHONE ENCOUNTER
----- Message from Herb Izquierdo PA-C sent at 2/25/2021 11:12 AM EST -----  Patient is an 27-year-old male known to Dr Sumaya Lambert with history of high-grade T1 bladder cancer  Status post cystectomy at [facility] by Dr Glenna Yang  Presented to the emergency department with abdominal pain, nausea in white count greater than 20  Imaging showed A new lesion found near his ileoconduit  Status post drainage 2/19, loopogram is negative and there does not appear to be any fistular connection to the abscess cavity for the ileal loop  Patient currently requesting to follow-up with Dr Neo Andersen in the office  Patient has an appointment on 04/08 with Dr Neo Andersen this was prior to patient being hospitalized  As patient would like to transition his care from [facility] Dr Glenna Yang to Dr Neo Andersen  Please schedule patient with closer appointment for transition of care and hospital follow-up        Thank you

## 2021-03-01 NOTE — TELEPHONE ENCOUNTER
I spoke to the patient and we scheduled a HFU appointment with Peter Quigley for 3/10/21 at 2pm  He is aware he needs to have a BMP done before the appointment

## 2021-03-01 NOTE — TELEPHONE ENCOUNTER
----- Message from Rodrick Boast, MD sent at 3/1/2021  1:08 PM EST -----  Notes were being written under the medical student  Look for notes under Jostin Stiles   ----- Message -----  From: Joceline Redding  Sent: 3/1/2021  12:54 PM EST  To: Rodrick Boast, MD    Hi Dr Lorena Corrales,    I am looking at this patient's chart and I don't see where he was seen by us  Discharge summary says Nephrology on board, but I don't see a consult note  Am I missing it? Thanks,    Dorian Lim  ----- Message -----  From: Rodrick Boast, MD  Sent: 2/25/2021  10:24 AM EST  To: Nephrology [facility]    Can you schedule this patient for follow up at [facility] office in 2 weeks  Can BMP be sent prior to visit      Getting discharged today from \[facility]\""    Thanks

## 2021-03-02 ENCOUNTER — OFFICE VISIT (OUTPATIENT)
Dept: INTERNAL MEDICINE CLINIC | Facility: CLINIC | Age: 81
End: 2021-03-02
Payer: MEDICARE

## 2021-03-02 VITALS
HEART RATE: 64 BPM | DIASTOLIC BLOOD PRESSURE: 64 MMHG | OXYGEN SATURATION: 95 % | TEMPERATURE: 96.9 F | BODY MASS INDEX: 26.37 KG/M2 | SYSTOLIC BLOOD PRESSURE: 136 MMHG | WEIGHT: 174 LBS | HEIGHT: 68 IN

## 2021-03-02 DIAGNOSIS — K65.1 PELVIC ABSCESS IN MALE (HCC): ICD-10-CM

## 2021-03-02 DIAGNOSIS — I25.10 CORONARY ARTERY DISEASE INVOLVING NATIVE CORONARY ARTERY OF NATIVE HEART WITHOUT ANGINA PECTORIS: ICD-10-CM

## 2021-03-02 DIAGNOSIS — E78.2 MIXED HYPERLIPIDEMIA: ICD-10-CM

## 2021-03-02 DIAGNOSIS — E43 SEVERE PROTEIN-CALORIE MALNUTRITION (HCC): ICD-10-CM

## 2021-03-02 DIAGNOSIS — F32.A ANXIETY AND DEPRESSION: ICD-10-CM

## 2021-03-02 DIAGNOSIS — I10 BENIGN ESSENTIAL HYPERTENSION: Primary | ICD-10-CM

## 2021-03-02 DIAGNOSIS — F41.9 ANXIETY AND DEPRESSION: ICD-10-CM

## 2021-03-02 DIAGNOSIS — E11.59 TYPE 2 DIABETES MELLITUS WITH OTHER CIRCULATORY COMPLICATION, WITHOUT LONG-TERM CURRENT USE OF INSULIN (HCC): ICD-10-CM

## 2021-03-02 PROCEDURE — 99496 TRANSJ CARE MGMT HIGH F2F 7D: CPT | Performed by: INTERNAL MEDICINE

## 2021-03-02 RX ORDER — SODIUM CHLORIDE 0.9 % (FLUSH) 0.9 %
SYRINGE (ML) INJECTION
COMMUNITY
Start: 2021-02-22 | End: 2021-07-13 | Stop reason: ALTCHOICE

## 2021-03-02 NOTE — ASSESSMENT & PLAN NOTE
Lab Results   Component Value Date    HGBA1C 6 0 (H) 02/18/2021   Does have a longstanding history of diabetes  As noted the last studies show that his hemoglobin A1c is under good control at 6 0 this was taken with the patient being ill and decreased p o  Intake  I did urge the patient his wife to keep our eye on the blood sugars and call if any problems with either hypo or hyperglycemia  We are encouraged with the patient's increased p o  Intake but again making proper food choices is extremely important

## 2021-03-02 NOTE — PROGRESS NOTES
Assessment/Plan:  TCM Call (since 1/30/2021)     Date and time call was made  3/1/2021  3:10 PM    Hospital care reviewed  Records reviewed    Patient was hospitialized at  [facility]        Date of Admission  02/17/21    Date of discharge  02/26/21    Diagnosis  Sepsis    Disposition  Home    Current Symptoms  None      TCM Call (since 1/30/2021)     Post hospital issues  None    Scheduled for follow up? 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 has had a a decline in his status  Patient was admitted to the hospital fatigue dehydration, found to be septic period patient did have studies performed showing abscess, fluid collection right lower quadrant of his abdomen felt to be leak from previous surgical procedure  Patient was started on IV antibiotics, interventional radiology was consulted and patient did have placement of drainage of the abscess  Patient slowly did improve during hospitalization  Complicated by acute on chronic renal failure, diabetes, hypertension, history of coronary artery disease but no acute ischemia  Patient is now discharged to home and is receiving the services from the visiting nurses, physical therapy, occupational therapy, does have an appointment in the near future to be seen by interventional radiology period outpatient consult with Nephrology, Urology for continued care  States in general he is feeling better  Appetite is improved  Feeling somewhat stronger but did have an accumulation of a lot of water weight during hospitalization and is slowly decreasing in weight    Still has some swelling bilateral lower extremities  No abdominal pain or cramps  No fever or chills  Patient continues to have what seems to be a purulence drainage from drains to the abdomen  He will be finishing his p o  Antibiotics as today  Encouraged to call the office begins to have further decline, fever, chills  His wife has adapted well has learned how to manage his drains  Again the VNA will be seeing the patient today at home to help manage his medical care  Patient will be seen again in the office in approximately 3-4 weeks for re-evaluation but was encouraged to call in between if some decline or not continued improvement  Any new medical complaints or concerns  DM[facility]di[facility] mellitus[facility] 2) [facility]    Lab Results   Component Value Date    HGBA1C 6 0 (H) 02/18/2021   Does have a longstanding history of diabetes  As noted the last studies show that his hemoglobin A1c is under good control at 6 0 this was taken with the patient being ill and decreased p o  Intake  I did urge the patient his wife to keep our eye on the blood sugars and call if any problems with either hypo or hyperglycemia  We are encouraged with the patient's increased p o  Intake but again making proper food choices is extremely important  Benign essential hypertension  Patient was having episodes of hypotension prior to admission  Patient's blood pressure is now showing some improvement  Was taken off is ACE-inhibitor during his hospitalization and his blood pressure continues to show some elevation will most likely need some modification treatment in the future  Again we will continue also to monitor his renal function  Coronary artery disease involving native coronary artery of native heart without angina pectoris  Patient did well during hospitalization with no chest pain or pressure and no increasing shortness of breath    He continues to follow-up with his cardiologist   We will continue to monitor his cholesterol and sugars    Anxiety and depression  With his multiple medical problems patient does have some underlying anxiety and depression  He remains on Celexa 20 mg daily  He states with him feeling better his mood has improved  His wife is supportive    Hyperlipidemia  Patient did have problems with anorexia protein calorie malnutrition with admission to the hospital   Patient has been more liberal with his diet since discharge but we did stress the importance of continuing to watch his intake of fats and cholesterol through his diet  He will remain on the atorvastatin as previously  Right sided Pelvic abscess in male [facility])  IR has drainage in place  Continues to show a purulence whitish drainage  Antibiotics were stopped today  Patient and family Gerogi Alexander were informed that if any signs of deterioration status to please call immediately  Severe protein-calorie malnutrition ([address] )  Malnutrition Findings:           BMI Findings: Body mass index is 26 46 kg/m²  With loss of appetite and decreased p o  Intake patient did show some signs of severe protein calorie malnutrition even though he does not have a BMI that is in a danger range  His appetite is improved  Will continue to monitor       Diagnoses and all orders for this visit:    Benign essential hypertension  -     CBC and differential; Future  -     Basic metabolic panel; Future    Type 2 diabetes mellitus with other circulatory complication, without long-term current use of insulin (HCC)  -     CBC and differential; Future    Coronary artery disease involving native coronary artery of native heart without angina pectoris    Anxiety and depression    Mixed hyperlipidemia    Right sided Pelvic abscess in [facility])    Severe protein-calorie malnutrition (HCC)    Other orders  -     Sodium Chloride Flush (Normal Saline Flush) 0 9 % SOLN          Subjective:      Patient ID: Aaron Level is a [de-identified] y o  male      Patient is an [de-identified] male history of multiple medical problems as outlined previously  Patient is here today for a transition of care visit recent hospitalization, abscess, fluid collection abdomen lower on the right after patient had undergone removal of his bladder, ileal loop diversion in [place]  Patient was seen by multiple specialists during his hospital stay and did have placement of drains which are still in place  Did slowly improve overall medically during hospitalization is discharged to home now  He has just stopped his oral antibiotics after receiving IV antibiotics during hospitalization which were dictated by culture results  Patient in general is feeling well, will be receiving in-home therapy including visiting nurses, physical therapy, occupational therapy  Appetite is improved  States bowels are somewhat soft but no diarrhea no abdominal pain or cramps      The following portions of the patient's history were reviewed and updated as appropriate: He  has a past medical history of Acute kidney injury ([address] ), Arthritis, Wright esophagus, BPH with obstruction/lower urinary tract symptoms, CAD (coronary artery disease), Cancer ([address] ), Cataract, acquired, Coronary artery disease, Diabetes mellitus ([address] ), Diabetic neuropathy ([address] ), Enlarged prostate with lower urinary tract symptoms (LUTS), Erectile dysfunction, GERD (gastroesophageal reflux disease), Hemoptysis, Hypercholesterolemia, Hypertension, Macular degeneration, Myocardial infarction ([address] ) (1998), OAB (overactive bladder), Testicular hypofunction, Testicular hypogonadism, Tinnitus, Umbilical hernia, Urge incontinence of urine, and Wears glasses    He   Patient Active Problem List    Diagnosis Date Noted    Severe protein-calorie malnutrition ([address] ) 02/19/2021    Sepsis ([facility] ) 02/19/2021    Right sided Pelvic abscess in male [facility]) 02/18/2021    Ambulatory dysfunction 02/16/2021    Frequent falls 02/16/2021    Anxiety and depression 12/22/2020    Overweight 12/22/2020    Fungal dermatitis 12/03/2020    Stage 3 chronic kidney disease 09/09/2020    Forearm mass, left 09/03/2020    Elevated troponin 07/21/2020    Liver function study, abnormal 06/25/2020    Mal[facility] of urinary bladder [facility] 03/24/2020    Positive urinary cytology 11/05/2019    Coronary artery disease involving native coronary artery of native heart without angina pectoris 09/09/2019    Ischemic cardiomyopathy 09/09/2019    History of bladder cancer 07/30/2019    Medicare annual wellness visit, subsequent 05/23/2019    Closed fracture of upper end of right fibula 03/11/2019    Obesity (BMI 30 0-34 9) 01/22/2019    Malignant neoplasm of overlapping sites of bladder ([facility] ) 01/10/2019    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/2019    Type 2 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 ([facility] ) 12/05/2018    Bladder tumor 11/01/2018    Overactive bladder 10/10/2018    Acute kidney injury ([facility] ) 05/30/2018    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    Backache 10/21/2013    Benign essential hypertension 10/11/2012    DMII (diabetes mellitus, type 2) ([facility] ) 10/11/2012    Hyperlipidemia 10/11/2012     He  has a past surgical history that includes pr colonoscopy flx dx w/collj spec when pfrmd (N/A, 4/25/2016); Cystoscopy (2013); Coronary artery bypass graft (07/16/2014); Colonoscopy; Inguinal hernia repair (Bilateral, 2015); Umbilical hernia repair (2012); Esophagogastroduodenoscopy; Tonsillectomy; Photodynamic Therapy; Transurethral resection of prostate (N/A, 12/20/2018); FL retrograde pyelogram (12/20/2018); pr cystourethroscopy,fulgur <0 5 cm lesn (N/A, 12/5/2019); FL retrograde pyelogram (12/5/2019); pr cystourethroscopy,fulgur 2-5 cm lesn (N/A, 4/16/2020); Upper gastrointestinal endoscopy; pr cystourethroscopy,biopsy (N/A, 9/10/2020); and CT guided perc drainage catheter placeme (2/19/2021)    His family history includes Cancer in his mother; Coronary artery disease in his father; Diabetes in his father; Heart disease in his father; Hyperlipidemia in his father; Hypertension in his father; Other in his mother  He  reports that he quit smoking about 49 years ago  His smoking use included cigarettes  He has a 13 00 pack-year smoking history  He has never used smokeless tobacco  He reports current alcohol use of about 2 0 standard drinks of alcohol per week  He reports that he does not use drugs    Current Outpatient Medications   Medication Sig Dispense Refill    acetaminophen (TYLENOL) 500 mg tablet Take 1,000 mg by mouth      aspirin 81 mg chewable tablet Chew 81 mg daily      atorvastatin (LIPITOR) 40 mg tablet Take 1 tablet (40 mg total) by mouth daily at bedtime 90 tablet 3    Blood Glucose Monitoring Suppl (OneTouch Verio Flex System) w/Device KIT USE TO TEST BLOOD GLUCOSE DAILY      Cholecalciferol (VITAMIN D) 50 MCG (2000 UT) tablet Take 2,000 Units by mouth daily      citalopram (CeleXA) 20 mg tablet Take 1 tablet (20 mg total) by mouth daily 30 tablet 3    fluticasone (FLONASE) 50 mcg/act nasal spray 2 sprays into each nostril daily 16 g 3    Lancets (OneTouch Delica Plus [id number]) MISC TEST BLOOD GLUCOSE ONCE DAILY      metFORMIN (GLUCOPHAGE-XR) 500 mg 24 hr tablet TAKE TWO TABLETS BY MOUTH TWICE A DAY WITH MEALS 360 tablet 0    metoprolol tartrate (LOPRESSOR) 50 mg tablet TAKE ONE TABLET BY MOUTH TWICE A  tablet 4    Multiple Vitamins-Minerals (CENTRUM SILVER 50+MEN PO) Take 1 tablet by mouth daily        Multiple Vitamins-Minerals (OCUVITE-LUTEIN PO) Take 1 tablet by mouth 2 (two) times a day Pt is taking preservision      Omega-3 Fatty Acids (fish oil) 1,000 mg Hold for 5 days from discharge      omeprazole (PriLOSEC) 40 MG capsule Take 40 mg by mouth daily at bedtime        OneTouch Verio test strip daily Test      Sodium Chloride Flush (Normal Saline Flush) 0 9 % SOLN       sodium chloride, PF, 0 9 % Infuse 10 mL into a venous catheter daily Please flush each drain with 10cc each daily 60 Syringe 0    traMADol (ULTRAM) 50 mg tablet Take 50 mg by mouth every 6 (six) hours as needed      vitamin E, tocopherol, 400 units capsule Take 400 Units by mouth daily      citalopram (CeleXA) 10 mg tablet Take 1 tablet (10 mg total) by mouth daily (Patient not taking: Reported on 2/18/2021) 30 tablet 5    nystatin (MYCOSTATIN) powder Apply topically 3 (three) times a day (Patient not taking: Reported on 2/18/2021) 30 g 3     No current facility-administered medications for this visit        Current Outpatient Medications on File Prior to Visit   Medication Sig    acetaminophen (TYLENOL) 500 mg tablet Take 1,000 mg by mouth    aspirin 81 mg chewable tablet Chew 81 mg daily    atorvastatin (LIPITOR) 40 mg tablet Take 1 tablet (40 mg total) by mouth daily at bedtime    Blood Glucose Monitoring Suppl (OneTouch Verio Flex System) w/Device KIT USE TO TEST BLOOD GLUCOSE DAILY    Cholecalciferol (VITAMIN D) 50 MCG (2000 UT) tablet Take 2,000 Units by mouth daily    citalopram (CeleXA) 20 mg tablet Take 1 tablet (20 mg total) by mouth daily    fluticasone (FLONASE) 50 mcg/act nasal spray 2 sprays into each nostril daily    Lancets (OneTouch Delica Plus [id number]) MISC TEST BLOOD GLUCOSE ONCE DAILY    metFORMIN (GLUCOPHAGE-XR) 500 mg 24 hr tablet TAKE TWO TABLETS BY MOUTH TWICE A DAY WITH MEALS    metoprolol tartrate (LOPRESSOR) 50 mg tablet TAKE ONE TABLET BY MOUTH TWICE A DAY    Multiple Vitamins-Minerals (CENTRUM SILVER 50+MEN PO) Take 1 tablet by mouth daily      Multiple Vitamins-Minerals (OCUVITE-LUTEIN PO) Take 1 tablet by mouth 2 (two) times a day Pt is taking preservision    Omega-3 Fatty Acids (fish oil) 1,000 mg Hold for 5 days from discharge    omeprazole (PriLOSEC) 40 MG capsule Take 40 mg by mouth daily at bedtime      OneTouch Verio test strip daily Test    Sodium Chloride Flush (Normal Saline Flush) 0 9 % SOLN     sodium chloride, PF, 0 9 % Infuse 10 mL into a venous catheter daily Please flush each drain with 10cc each daily    traMADol (ULTRAM) 50 mg tablet Take 50 mg by mouth every 6 (six) hours as needed    vitamin E, tocopherol, 400 units capsule Take 400 Units by mouth daily    [] amoxicillin-clavulanate (AUGMENTIN) 875-125 mg per tablet Take 1 tablet by mouth every 12 (twelve) hours for 3 days    citalopram (CeleXA) 10 mg tablet Take 1 tablet (10 mg total) by mouth daily (Patient not taking: Reported on 2021)    nystatin (MYCOSTATIN) powder Apply topically 3 (three) times a day (Patient not taking: Reported on 2021)     No current facility-administered medications on file prior to visit  He is allergic to fentanyl and morphine and related       Review of Systems   Constitutional: Positive for activity change ( with physical therapy hopefully will have improved physical activity level), appetite change ( improved appetite) and fatigue ( still some fatigue)  Negative for chills, diaphoresis, fever and unexpected weight change  HENT: Negative  Eyes: Negative  Respiratory: Negative  Cardiovascular: Positive for leg swelling ( some edema bilateral lower extremities post hospitalization and wife states this is slowly improving)  Negative for chest pain and palpitations  Gastrointestinal: Negative  Endocrine: Negative  Genitourinary:        Draining clear urine   Musculoskeletal: Negative  Skin: Negative  Allergic/Immunologic: Negative  Neurological: Positive for weakness ( generalized weakness but this has improved)  Negative for dizziness, tremors, seizures, syncope, facial asymmetry, speech difficulty, light-headedness, numbness and headaches  Hematological: Negative  Psychiatric/Behavioral: Negative for agitation, behavioral problems, confusion, decreased concentration, dysphoric mood, hallucinations, self-injury, sleep disturbance and suicidal ideas   The patient is nervous/anxious ( less anxious better spirits overall)  The patient is not hyperactive  Objective:      /64   Pulse 64   Temp (!) 96 9 °F (36 1 °C) (Tympanic)   Ht 5' 8" (1 727 m)   Wt 78 9 kg (174 lb)   SpO2 95%   BMI 26 46 kg/m²          Physical Exam  Vitals signs and nursing note reviewed  Constitutional:       General: He is not in acute distress  Appearance: Normal appearance  He is not ill-appearing, toxic-appearing or diaphoretic  Comments: Pleasant much more energetic cheerful 41-year-old male who is awake alert no acute distress and oriented x3  Accompanied by his wife the appointment today  HENT:      Head: Normocephalic and atraumatic  Right Ear: Tympanic membrane, ear canal and external ear normal  There is no impacted cerumen  Left Ear: Tympanic membrane, ear canal and external ear normal  There is no impacted cerumen  Nose: Nose normal  No congestion or rhinorrhea  Mouth/Throat:      Mouth: Mucous membranes are dry  Pharynx: Oropharynx is clear  No oropharyngeal exudate or posterior oropharyngeal erythema  Eyes:      General: No scleral icterus  Right eye: No discharge  Left eye: No discharge  Extraocular Movements: Extraocular movements intact  Conjunctiva/sclera: Conjunctivae normal       Pupils: Pupils are equal, round, and reactive to light  Neck:      Musculoskeletal: Normal range of motion and neck supple  No neck rigidity or muscular tenderness  Vascular: No carotid bruit  Cardiovascular:      Rate and Rhythm: Normal rate and regular rhythm  Heart sounds: Normal heart sounds  No murmur  No friction rub  No gallop  Pulmonary:      Effort: Pulmonary effort is normal  No respiratory distress  Breath sounds: Normal breath sounds  No stridor  No wheezing, rhonchi or rales  Chest:      Chest wall: No tenderness  Abdominal:      General: Bowel sounds are normal  There is no distension        Palpations: Abdomen is soft  There is no mass  Tenderness: There is no abdominal tenderness  There is no right CVA tenderness, left CVA tenderness, guarding or rebound  Hernia: No hernia is present  Comments: Overweight, drainage in place to lower abdomen, draining white thick material   Musculoskeletal: Normal range of motion  General: No swelling, tenderness, deformity or signs of injury  Right lower leg: Edema (Plus one pitting edema bilateral extremities) present  Left lower leg: Edema present  Lymphadenopathy:      Cervical: No cervical adenopathy  Skin:     General: Skin is warm and dry  Coloration: Skin is not jaundiced or pale  Findings: Rash ( some stasis dermatitis, flaking skin surfaces, no open lesions extremities) present  No bruising, erythema or lesion  Neurological:      Mental Status: He is alert  Mental status is at baseline  Cranial Nerves: No cranial nerve deficit  Sensory: Sensory deficit present  Motor: Weakness ( complaining of generalized weakness no focal changes) present  Coordination: Coordination abnormal       Gait: Gait abnormal ( slow gait using walker for stability)  Deep Tendon Reflexes: Reflexes abnormal (Absent patella and Achilles tendon reflexes bilaterally)  Psychiatric:         Mood and Affect: Mood normal          Behavior: Behavior normal          Thought Content:  Thought content normal          Judgment: Judgment normal

## 2021-03-02 NOTE — ASSESSMENT & PLAN NOTE
IR has drainage in place  Continues to show a purulence whitish drainage  Antibiotics were stopped today  Patient and family Ema Miranda were informed that if any signs of deterioration status to please call immediately

## 2021-03-02 NOTE — ASSESSMENT & PLAN NOTE
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 has had a a decline in his status  Patient was admitted to the hospital fatigue dehydration, found to be septic period patient did have studies performed showing abscess, fluid collection right lower quadrant of his abdomen felt to be leak from previous surgical procedure  Patient was started on IV antibiotics, interventional radiology was consulted and patient did have placement of drainage of the abscess  Patient slowly did improve during hospitalization  Complicated by acute on chronic renal failure, diabetes, hypertension, history of coronary artery disease but no acute ischemia  Patient is now discharged to home and is receiving the services from the visiting nurses, physical therapy, occupational therapy, does have an appointment in the near future to be seen by interventional radiology period outpatient consult with Nephrology, Urology for continued care  States in general he is feeling better  Appetite is improved  Feeling somewhat stronger but did have an accumulation of a lot of water weight during hospitalization and is slowly decreasing in weight  Still has some swelling bilateral lower extremities  No abdominal pain or cramps  No fever or chills  Patient continues to have what seems to be a purulence drainage from drains to the abdomen  He will be finishing his p o  Antibiotics as today  Encouraged to call the office begins to have further decline, fever, chills  His wife has adapted well has learned how to manage his drains  Again the VNA will be seeing the patient today at home to help manage his medical care  Patient will be seen again in the office in approximately 3-4 weeks for re-evaluation but was encouraged to call in between if some decline or not continued improvement    Any new medical complaints or concerns

## 2021-03-02 NOTE — ASSESSMENT & PLAN NOTE
Patient did have problems with anorexia protein calorie malnutrition with admission to the hospital   Patient has been more liberal with his diet since discharge but we did stress the importance of continuing to watch his intake of fats and cholesterol through his diet  He will remain on the atorvastatin as previously

## 2021-03-02 NOTE — ASSESSMENT & PLAN NOTE
Malnutrition Findings:           BMI Findings: Body mass index is 26 46 kg/m²  With loss of appetite and decreased p o  Intake patient did show some signs of severe protein calorie malnutrition even though he does not have a BMI that is in a danger range  His appetite is improved    Will continue to monitor

## 2021-03-02 NOTE — ASSESSMENT & PLAN NOTE
Patient did well during hospitalization with no chest pain or pressure and no increasing shortness of breath    He continues to follow-up with his cardiologist   We will continue to monitor his cholesterol and sugars

## 2021-03-02 NOTE — ASSESSMENT & PLAN NOTE
Patient was having episodes of hypotension prior to admission  Patient's blood pressure is now showing some improvement  Was taken off is ACE-inhibitor during his hospitalization and his blood pressure continues to show some elevation will most likely need some modification treatment in the future  Again we will continue also to monitor his renal function

## 2021-03-02 NOTE — ASSESSMENT & PLAN NOTE
With his multiple medical problems patient does have some underlying anxiety and depression  He remains on Celexa 20 mg daily  He states with him feeling better his mood has improved    His wife is supportive

## 2021-03-03 NOTE — TELEPHONE ENCOUNTER
Patient's wife called stating she got a call from central scheduling to schedule a ct scan   She stated he already had one and wants to know what else he would need before appointment in April with Dr Shannan Suárze      Patient can be reached at [phone]

## 2021-03-03 NOTE — TELEPHONE ENCOUNTER
Spoke with Shannen Nick and explained he just  Need Ct scan complete  I saw he has a tube change scheduled for 3/5

## 2021-03-04 NOTE — TELEPHONE ENCOUNTER
Patient's records from [facility] are in [place]  Including notes, labs, imaging, and path from [address] in the fall of 2020  When the appointment approaches, I can go in and print some of these records so that the doc can see them more easily

## 2021-03-05 ENCOUNTER — HOSPITAL ENCOUNTER (OUTPATIENT)
Dept: RADIOLOGY | Facility: HOSPITAL | Age: 81
Discharge: HOME | End: 2021-03-05
Payer: MEDICARE

## 2021-03-05 VITALS
HEART RATE: 60 BPM | DIASTOLIC BLOOD PRESSURE: 76 MMHG | SYSTOLIC BLOOD PRESSURE: 168 MMHG | OXYGEN SATURATION: 98 % | RESPIRATION RATE: 18 BRPM

## 2021-03-05 DIAGNOSIS — K65.1 PELVIC ABSCESS IN MALE (HCC): ICD-10-CM

## 2021-03-05 PROCEDURE — 49424 ASSESS CYST CONTRAST INJECT: CPT | Performed by: STUDENT IN AN ORGANIZED HEALTH CARE EDUCATION/TRAINING PROGRAM

## 2021-03-05 PROCEDURE — 76080 X-RAY EXAM OF FISTULA: CPT

## 2021-03-05 PROCEDURE — 76080 X-RAY EXAM OF FISTULA: CPT | Performed by: STUDENT IN AN ORGANIZED HEALTH CARE EDUCATION/TRAINING PROGRAM

## 2021-03-05 PROCEDURE — 49424 ASSESS CYST CONTRAST INJECT: CPT

## 2021-03-05 RX ADMIN — IOHEXOL 7 ML: 350 INJECTION, SOLUTION INTRAVENOUS at 12:36

## 2021-03-05 NOTE — BRIEF OP NOTE (RAD/CATH)
INTERVENTIONAL RADIOLOGY PROCEDURE NOTE    Date: 3/5/2021    Procedure: IR DRAINAGE TUBE CHECK AND/OR REMOVAL    Preoperative diagnosis:   1  Right sided Pelvic abscess in male [facility])         Postoperative diagnosis: Same  Surgeon: Higinio Sage MD     Assistant: None  No qualified resident was available  Blood loss: 0 ml    Specimens: None  Findings:   2 site tube check showed near resolution of both cavities  Both drains removed  Complications: None immediate      Anesthesia: none

## 2021-03-05 NOTE — DISCHARGE INSTRUCTIONS
Drainage Tube Removal    Your drainage tube was removed today  What you need know at home:   Keep a clean dry dressing at the tube site until the small opening closes  It will take twenty four to forty eight hours  Keep the site dry until it heals  A small amount of drainage on your dressing is normal  Resume your normal diet  Small sips of flat soda will help with any nausea  Contact Interventional Radiology for any of the following: You have pain, fever greater than 101, shaking chills  If you have increased redness or swelling at the site  Drainage Tube Removal    Your drainage tube was removed today  What you need know at home:   Keep a clean dry dressing at the tube site until the small opening closes  It will take twenty four to forty eight hours  Keep the site dry until it heals  A small amount of drainage on your dressing is normal  Resume your normal diet  Small sips of flat soda will help with any nausea  Contact Interventional Radiology for any of the following: You have pain, fever greater than 101, shaking chills  If you have increased redness or swelling at the site  I the drainage from your site does not stop  If the site drains pus or has a bad odor  Contact Interventional Radiology at [phone]   [facility] PATIENTS: Contact Interventional Radiology at [phone]) Vijaya Benitez PATIENTS: Contact Interventional Radiology at [phone]) if:      I the drainage from your site does not stop  If the site drains pus or has a bad odor       Contact Interventional Radiology at [phone]   [facility] PATIENTS: Contact Interventional Radiology at [phone]) Vijaya Benitez PATIENTS: Contact Interventional Radiology at [phone]) if:                                                                            Drainage Tube Removal    Your drainage tube was removed today     What you need know at home:   Keep a clean dry dressing at the tube site until the small opening closes  It will take twenty four to forty eight hours  Keep the site dry until it heals  A small amount of drainage on your dressing is normal  Resume your normal diet  Small sips of flat soda will help with any nausea  Contact Interventional Radiology for any of the following: You have pain, fever greater than 101, shaking chills  If you have increased redness or swelling at the site  Drainage Tube Removal    Your drainage tube was removed today  What you need know at home:   Keep a clean dry dressing at the tube site until the small opening closes  It will take twenty four to forty eight hours  Keep the site dry until it heals  A small amount of drainage on your dressing is normal  Resume your normal diet  Small sips of flat soda will help with any nausea  Contact Interventional Radiology for any of the following: You have pain, fever greater than 101, shaking chills  If you have increased redness or swelling at the site  I the drainage from your site does not stop  If the site drains pus or has a bad odor  Contact Interventional Radiology at [phone]   [facility] PATIENTS: Contact Interventional Radiology at [phone]) [facility] PATIENTS: Contact Interventional Radiology at [phone]) if:      I the drainage from your site does not stop  If the site drains pus or has a bad odor       Contact Interventional Radiology at [phone]   [facility] PATIENTS: Contact Interventional Radiology at [phone]) [facility] PATIENTS: Contact Interventional Radiology at [phone]) if:

## 2021-03-05 NOTE — SEDATION DOCUMENTATION
Tube removed x2  Patient aware that if he has increase in pain, fevers, drainage or abd distension that he need to reach out to PCP immediately

## 2021-03-05 NOTE — PROGRESS NOTES
Interventional Radiology Preprocedure Note    History/Indication for procedure:   Don Flores is a [de-identified] y o  male with a PMH of bladder ca s/p cystectomy c/b fluid collections, s/p 2 separate anterior abdominal wall fluid collection drain insertions several weeks ago  Both drains have had <10 ml/output/daily  No f/c/pain/pericatheter leakage      /76   Pulse 60   Resp 18   SpO2 98%     Relevant past medical history:    Past Medical History:   Diagnosis Date    Acute kidney injury ([facility] [address] )     Arthritis     Hands    Wright esophagus     BPH with obstruction/lower urinary tract symptoms     CAD (coronary artery disease)     Last assessed 09/16/15    Cancer ([facility] [address] )     bladder    Cataract, acquired     Last assessed 10/10/17    Coronary artery disease     Diabetes mellitus ([facility] [address] )     NIDDM    Diabetic neuropathy ([facility] [address] )     Feet    Enlarged prostate with lower urinary tract symptoms (LUTS)     Last assessed 10/10/17    Erectile dysfunction     GERD (gastroesophageal reflux disease)     Last assessed 10/10/17    Hemoptysis     Last assessed 03/14/16    Hypercholesterolemia     Last assessed 10/10/17    Hypertension     Last assessed 10/10/17    Macular degeneration     Right eye is particularly affected    Myocardial infarction ([facility] [address] ) 1998    OAB (overactive bladder)     Testicular hypofunction     Testicular hypogonadism     Last assessed 10/10/17    Tinnitus     Umbilical hernia     Last assessed 06/18/14    Urge incontinence of urine     Wears glasses      Patient Active Problem List   Diagnosis    Backache    Benign essential hypertension    DMII (diabetes mellitus, type 2) ([address] )    Hyperlipidemia    Wright's esophagus with esophagitis    Acute kidney injury ([address] )    Overactive bladder    Bladder tumor    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)    Hx of CABG    Malignant neoplasm of overlapping sites of bladder (HCC)    Obesity (BMI 30 0-34  9)    Closed fracture of upper end of right fibula    Medicare annual wellness visit, subsequent    History of bladder cancer    Coronary artery disease involving native coronary artery of native heart without angina pectoris    Ischemic cardiomyopathy    Positive urinary cytology    [facility]    Liver function study, abnormal    Elevated troponin    Forearm mass, left    Stage 3 chronic kidney disease    Fungal dermatitis    Anxiety and depression    Overweight    Ambulatory dysfunction    Frequent falls    Right sided Pelvic abscess in male [facility])    Severe protein-calorie malnutrition (HCC)    Sepsis ([facility] )       Medications:    Inpatient Medications:     Scheduled Medications:      Infusions:  No current facility-administered medications for this encounter         PRN:      Outpatient Medications:  Current Outpatient Medications on File Prior to Encounter   Medication Sig Dispense Refill    acetaminophen (TYLENOL) 500 mg tablet Take 1,000 mg by mouth      aspirin 81 mg chewable tablet Chew 81 mg daily      atorvastatin (LIPITOR) 40 mg tablet Take 1 tablet (40 mg total) by mouth daily at bedtime 90 tablet 3    Blood Glucose Monitoring Suppl (OneTouch Verio Flex System) w/Device KIT USE TO TEST BLOOD GLUCOSE DAILY      Cholecalciferol (VITAMIN D) 50 MCG (2000 UT) tablet Take 2,000 Units by mouth daily      citalopram (CeleXA) 10 mg tablet Take 1 tablet (10 mg total) by mouth daily (Patient not taking: Reported on 2/18/2021) 30 tablet 5    citalopram (CeleXA) 20 mg tablet Take 1 tablet (20 mg total) by mouth daily 30 tablet 3    fluticasone (FLONASE) 50 mcg/act nasal spray 2 sprays into each nostril daily 16 g 3    Lancets (OneTouch Delica Plus [id number]) MISC TEST BLOOD GLUCOSE ONCE DAILY      metFORMIN (GLUCOPHAGE-XR) 500 mg 24 hr tablet TAKE TWO TABLETS BY MOUTH TWICE A DAY WITH MEALS 360 tablet 0    metoprolol tartrate (LOPRESSOR) 50 mg tablet TAKE ONE TABLET BY MOUTH TWICE A  tablet 4    Multiple Vitamins-Minerals (CENTRUM SILVER 50+MEN PO) Take 1 tablet by mouth daily        Multiple Vitamins-Minerals (OCUVITE-LUTEIN PO) Take 1 tablet by mouth 2 (two) times a day Pt is taking preservision      nystatin (MYCOSTATIN) powder Apply topically 3 (three) times a day (Patient not taking: Reported on 2/18/2021) 30 g 3    Omega-3 Fatty Acids (fish oil) 1,000 mg Hold for 5 days from discharge      omeprazole (PriLOSEC) 40 MG capsule Take 40 mg by mouth daily at bedtime        OneTouch Verio test strip daily Test      Sodium Chloride Flush (Normal Saline Flush) 0 9 % SOLN       sodium chloride, PF, 0 9 % Infuse 10 mL into a venous catheter daily Please flush each drain with 10cc each daily 60 Syringe 0    traMADol (ULTRAM) 50 mg tablet Take 50 mg by mouth every 6 (six) hours as needed      vitamin E, tocopherol, 400 units capsule Take 400 Units by mouth daily       No current facility-administered medications on file prior to encounter  Allergies   Allergen Reactions    Fentanyl Hallucinations    Morphine And Related Other (See Comments)     While having an MI patient received morphine and had adverse reaction but doesn't know what happened         Anticoagulants: none    Labs:   CBC with diff:   Lab Results   Component Value Date    WBC 17 00 (H) 02/26/2021    HGB 9 3 (L) 02/26/2021    HCT 30 3 (L) 02/26/2021     (H) 02/26/2021     02/26/2021    MCH 31 5 02/26/2021    MCHC 30 7 (L) 02/26/2021    RDW 15 6 (H) 02/26/2021    MPV 11 4 02/26/2021    NRBC 0 02/26/2021     BMP/CMP:  Lab Results   Component Value Date     08/13/2015    K 4 8 02/25/2021    K 4 2 08/13/2015     (H) 02/25/2021     (H) 08/13/2015    CO2 20 (L) 02/25/2021    CO2 23 08/13/2015    ANIONGAP 10 08/13/2015    BUN 23 02/25/2021    BUN 22 08/13/2015    CREATININE 1 97 (H) 02/25/2021    CREATININE 1 29 08/13/2015    GLUCOSE 128 08/13/2015    CALCIUM 8 6 02/25/2021    CALCIUM 8 8 08/13/2015    AST 18 02/18/2021    AST 18 08/13/2015    ALT 19 02/18/2021    ALT 25 08/13/2015    ALKPHOS 72 02/18/2021    ALKPHOS 61 08/13/2015    PROT 6 8 08/13/2015    BILITOT 0 58 08/13/2015    EGFR 31 02/25/2021   ,     Coags:   Lab Results   Component Value Date    PTT 26 11/26/2019    PTT 22 (L) 12/21/2014    INR 1 25 (H) 02/19/2021    INR 0 99 12/21/2014   ,          Relevant imaging studies:   Reviewed    Directed physical examination:  CONSTITUTIONAL: The patient appeared well in no acute distress  NEUROLOGICAL: alert, awake, answering questions appropriately  PSYCHIATRIC: Affect normal   PULMONARY: No respiratory distress  CARDIAC: normal sinus rhythm on monitor, without tachycardia  GASTROINTESTINAL: abdomen was soft, round, nontender  EXTREMITIES: No cyanosis  Assessment/Plan: For tube check x 2    Sedation/Anesthesia plan:  Local sedation will be used as needed for procedure  Consent with alternatives to the procedure, risks and benefits have been explained and discussed with the patient/patient's family: continuation of care

## 2021-03-08 ENCOUNTER — LAB (OUTPATIENT)
Dept: LAB | Facility: CLINIC | Age: 81
End: 2021-03-08
Payer: MEDICARE

## 2021-03-08 DIAGNOSIS — I10 BENIGN ESSENTIAL HYPERTENSION: ICD-10-CM

## 2021-03-08 DIAGNOSIS — N18.32 STAGE 3B CHRONIC KIDNEY DISEASE (HCC): ICD-10-CM

## 2021-03-08 LAB
ANION GAP SERPL CALCULATED.3IONS-SCNC: 7 MMOL/L (ref 4–13)
BUN SERPL-MCNC: 34 MG/DL (ref 5–25)
CALCIUM SERPL-MCNC: 8.8 MG/DL (ref 8.3–10.1)
CHLORIDE SERPL-SCNC: 111 MMOL/L (ref 100–108)
CO2 SERPL-SCNC: 21 MMOL/L (ref 21–32)
CREAT SERPL-MCNC: 2.01 MG/DL (ref 0.6–1.3)
GFR SERPL CREATININE-BSD FRML MDRD: 30 ML/MIN/1.73SQ M
GLUCOSE P FAST SERPL-MCNC: 115 MG/DL (ref 65–99)
MAGNESIUM SERPL-MCNC: 1.3 MG/DL (ref 1.6–2.6)
PHOSPHATE SERPL-MCNC: 3.2 MG/DL (ref 2.3–4.1)
POTASSIUM SERPL-SCNC: 4.7 MMOL/L (ref 3.5–5.3)
SODIUM SERPL-SCNC: 139 MMOL/L (ref 136–145)

## 2021-03-08 PROCEDURE — 83735 ASSAY OF MAGNESIUM: CPT

## 2021-03-08 PROCEDURE — 36415 COLL VENOUS BLD VENIPUNCTURE: CPT

## 2021-03-08 PROCEDURE — 84100 ASSAY OF PHOSPHORUS: CPT

## 2021-03-08 PROCEDURE — 80048 BASIC METABOLIC PNL TOTAL CA: CPT

## 2021-03-09 PROBLEM — E83.42 HYPOMAGNESEMIA: Status: ACTIVE | Noted: 2021-03-09

## 2021-03-09 NOTE — PATIENT INSTRUCTIONS
All questions asked and answered  The patient has been instructed to call office at [phone] with any questions or concerns  The patient has been instructed to obtain prescribed blood work and urine studies prior to next appointment  Avoid NSAID products to include Motrin, Advil, Ibuprofen, Aleve, Naprosyn, or naproxen   Recommend low-sodium diet  Start magnesium oxide 400 mg  b i d   For low magnesium level for two weeks  Get updated blood work and urine studies in three months with return office visit  Continue to hold the Irbesartan   Call office if Blood pressure increases to 150's

## 2021-03-09 NOTE — PROGRESS NOTES
OFFICE FOLLOW UP - Nephrology   Harry Kaiser [de-identified] y o  male MRN: [id number]       ASSESSMENT and PLAN:  Miller Brooks was seen today for follow-up and chronic kidney disease  Diagnoses and all orders for this visit:    Acute kidney injury ([facility] [address] )    Stage 3b chronic kidney disease  -     Basic metabolic panel; Future  -     CBC and differential; Future  -     Magnesium; Future  -     Phosphorus; Future  -     PTH, intact; Future  -     Protein / creatinine ratio, urine; Future    Benign essential hypertension    Ischemic cardiomyopathy    Hypomagnesemia    Essential hypertension, benign      Acute kidney injury:  Improving on discharge  -etiology:  Suspect secondary to prerenal azotemia with ATN  -admission creatinine 3 59 and discharge creatinine 1 97 02/25/2021  -most recent creatinine on 03/08/2021 was 2 01  -remains off irbesartan -will continue to hold in the setting of recent AK I and creatinine not back at baseline  -will have patient return in three months with updated blood work and urine studies    Chronic kidney disease III:  Suspect secondary to diabetes, hypertension with decreased EF of 45%  -after review medical records baseline creatinine 1 2-1 6  -as above will have patient return in three months with updated blood work and urine studies for ongoing Chronic Kidney Disease management    Hypertension:  BP acceptable 130/60  -currently off Irbesartan  -currently on metoprolol 50 mg 2 times daily  -no change in current medications  -encourage low-sodium diet    Hypo magnesium:  Suspect secondary to poor oral intake  -current magnesium 1 3  -will start magnesium oxide 400 mg twice daily for two weeks-patient agreed and aware    Anemia:   In the setting of recent illness, as well as possible component of Chronic Kidney Disease  -most recent hemoglobin on 02/26/2021 was 9 3  -will repeat CBC with next office visit    Recent sepsis secondary to right pelvic abscess: status post IR guided drainage and drain placement-removed on 03/05/2021  -antibiotics course completed  -previously treated with Augmentin for cultures revealing E coli and Klebsiella  -per primary team      Patient Instructions   All questions asked and answered  The patient has been instructed to call office at [phone] with any questions or concerns  The patient has been instructed to obtain prescribed blood work and urine studies prior to next appointment  Avoid NSAID products to include Motrin, Advil, Ibuprofen, Aleve, Naprosyn, or naproxen   Recommend low-sodium diet  Start magnesium oxide 400 mg  b i d  For low magnesium level for two weeks  Get updated blood work and urine studies in three months with return office visit  Continue to hold the Irbesartan   Call office if Blood pressure increases to 150's         HPI: Ashley Ramey is a [de-identified] y o  male who is here for Follow-up (KO) and Chronic Kidney Disease  Patient returns to office for hospital follow-up for KO thought to be secondary to prerenal azotemia and ATN  He has a past medical history of CAD s/p CABG, bladder cancer s/p ileal condiut 10/2020, DM II, HTN, and CKD III baseline creatinine 1 2-1 6 who initially presented the [facility] on [facility] with abdominal pain with vomiting and palpable mass below ileal conduit  Nephrology was consulted for acute kidney injury with admission creatinine of 3 59  During his hospitalization he was treated with IV fluids and holding his ARB  Discharge creatinine 1 97 with stable electrolytes and acid-base on 02/25/2021  Most recent blood work on 03/08/2021 has been reviewed and reveals creatinine 2 01, potassium 4 7, sodium 139, phosphorus 3 2 and magnesium 1 3  Medication list reviewed  On discussion, he is feeling well  He reports the drain was removed on 03/05/2021 and he completed his antibiotic course  He denies chest pain, shortness of breath, dizziness, lightheadedness, nausea, vomiting, urinary issues, fevers, chills    He reports trace lower extremity edema but improving since discharge  He reports eating better      ROS:   All the systems were reviewed and were negative except as documented on the HPI      Allergies: Fentanyl and Morphine and related    Medications:   Current Outpatient Medications:     acetaminophen (TYLENOL) 500 mg tablet, Take 1,000 mg by mouth, Disp: , Rfl:     aspirin 81 mg chewable tablet, Chew 81 mg daily, Disp: , Rfl:     atorvastatin (LIPITOR) 40 mg tablet, Take 1 tablet (40 mg total) by mouth daily at bedtime, Disp: 90 tablet, Rfl: 3    Blood Glucose Monitoring Suppl (OneTouch Verio Flex System) w/Device KIT, USE TO TEST BLOOD GLUCOSE DAILY, Disp: , Rfl:     Cholecalciferol (VITAMIN D) 50 MCG (2000 UT) tablet, Take 2,000 Units by mouth daily, Disp: , Rfl:     citalopram (CeleXA) 10 mg tablet, Take 1 tablet (10 mg total) by mouth daily (Patient taking differently: Take 20 mg by mouth daily ), Disp: 30 tablet, Rfl: 5    Lancets (OneTouch Delica Plus [id number]) MISC, TEST BLOOD GLUCOSE ONCE DAILY, Disp: , Rfl:     metFORMIN (GLUCOPHAGE-XR) 500 mg 24 hr tablet, TAKE TWO TABLETS BY MOUTH TWICE A DAY WITH MEALS (Patient taking differently: Take 500 mg by mouth 2 (two) times a day with meals ), Disp: 360 tablet, Rfl: 0    metoprolol tartrate (LOPRESSOR) 50 mg tablet, TAKE ONE TABLET BY MOUTH TWICE A DAY, Disp: 180 tablet, Rfl: 4    Multiple Vitamins-Minerals (OCUVITE-LUTEIN PO), Take 1 tablet by mouth 2 (two) times a day Pt is taking preservision, Disp: , Rfl:     omeprazole (PriLOSEC) 40 MG capsule, Take 40 mg by mouth daily at bedtime  , Disp: , Rfl:     OneTouch Verio test strip, daily Test, Disp: , Rfl:     traMADol (ULTRAM) 50 mg tablet, Take 50 mg by mouth every 6 (six) hours as needed, Disp: , Rfl:     vitamin E, tocopherol, 400 units capsule, Take 400 Units by mouth daily, Disp: , Rfl:     citalopram (CeleXA) 20 mg tablet, Take 1 tablet (20 mg total) by mouth daily (Patient not taking: Reported on 3/10/2021), Disp: 30 tablet, Rfl: 3    fluticasone (FLONASE) 50 mcg/act nasal spray, 2 sprays into each nostril daily (Patient not taking: Reported on 3/10/2021), Disp: 16 g, Rfl: 3    Multiple Vitamins-Minerals (CENTRUM SILVER 50+MEN PO), Take 1 tablet by mouth daily  , Disp: , Rfl:     nystatin (MYCOSTATIN) powder, Apply topically 3 (three) times a day (Patient not taking: Reported on 2/18/2021), Disp: 30 g, Rfl: 3    Omega-3 Fatty Acids (fish oil) 1,000 mg, Hold for 5 days from discharge, Disp: , Rfl:     Sodium Chloride Flush (Normal Saline Flush) 0 9 % SOLN, , Disp: , Rfl:     sodium chloride, PF, 0 9 %, Infuse 10 mL into a venous catheter daily Please flush each drain with 10cc each daily (Patient not taking: Reported on 3/10/2021), Disp: 60 Syringe, Rfl: 0    Past Medical History:   Diagnosis Date    Acute kidney injury ([address] )     Arthritis     Hands    Wright esophagus     BPH with obstruction/lower urinary tract symptoms     CAD (coronary artery disease)     Last assessed 09/16/15    Cancer ([address] )     bladder    Cataract, acquired     Last assessed 10/10/17    Coronary artery disease     Diabetes mellitus ([address] )     NIDDM    Diabetic neuropathy ([address] )     Feet    Enlarged prostate with lower urinary tract symptoms (LUTS)     Last assessed 10/10/17    Erectile dysfunction     GERD (gastroesophageal reflux disease)     Last assessed 10/10/17    Hemoptysis     Last assessed 03/14/16    Hypercholesterolemia     Last assessed 10/10/17    Hypertension     Last assessed 10/10/17    Macular degeneration     Right eye is particularly affected    Myocardial infarction ([address] ) 1998    OAB (overactive bladder)     Testicular hypofunction     Testicular hypogonadism     Last assessed 10/10/17    Tinnitus     Umbilical hernia     Last assessed 06/18/14    Urge incontinence of urine     Wears glasses      Past Surgical History:   Procedure Laterality Date    COLONOSCOPY      CORONARY ARTERY BYPASS GRAFT  07/16/2014    ABG x 4 LIMA to LAD,SVG to diagnoal 2 SVG to OM-1, SVG to PDA, resection of partial plerual mass    CT GUIDED PERC DRAINAGE CATHETER PLACEMENT  2/19/2021    CYSTOSCOPY  2013    ESOPHAGOGASTRODUODENOSCOPY      FL RETROGRADE PYELOGRAM  12/20/2018    FL RETROGRADE PYELOGRAM  12/5/2019    INGUINAL HERNIA REPAIR Bilateral 2015    IR DRAINAGE TUBE CHECK AND/OR REMOVAL  3/5/2021    PHOTODYNAMIC THERAPY      For Barretts esophagus    MT COLONOSCOPY FLX DX W/COLLJ SPEC WHEN PFRMD N/A 4/25/2016    Procedure: COLONOSCOPY;  Surgeon: Sandrita Fuller MD;  Location:  GI LAB; Service: Gastroenterology    MT CYSTOURETHROSCOPY,BIOPSY N/A 9/10/2020    Procedure: CYSTOSCOPY, COLLECTION OF LEFT KIDNEY CYTOLOGY, BILATERAL RETROGRADE PYELOGRAM, BLADDER WALL BIOPSIES  AND [address], RANDOM BLADDER TUMOR BIOPSIES;  Surgeon: Shyam Lafleur MD;  Location: [facility] MAIN OR;  Service: Urology    MT CYSTOURETHROSCOPY,FULGUR 2-5 CM LESN N/A 4/16/2020    Procedure: CYSTOSCOPY, TRANSURETHRAL RESECTION OF BLADDER TUMOR (TURBT);   Surgeon: Shyam Lafleur MD;  Location: [facility];  Service: Urology    MT CYSTOURETHROSCOPY,FULGUR <0 5 CM LESN N/A 12/5/2019    Procedure: CYSTO W/TURBT AND TRANSURETHRAL PROSTATE BIOPSY AND OPENING OF BLADDER NECK CONTRACTURE, B/L Retrograde pyelogram;  Surgeon: Shyam Lafleur MD;  Location: [facility];  Service: Urology    TONSILLECTOMY      TRANSURETHRAL RESECTION OF PROSTATE N/A 12/20/2018    Procedure: CYSTO, PHOTO SELECTIVE VAPORIZATION OF PROSTATE, B/L RETROGRADE PYELOGRAM, TURBT;  Surgeon: Shyam Lafleur MD;  Location: [facility];  Service: Urology    UMBILICAL HERNIA REPAIR  2012    UPPER GASTROINTESTINAL ENDOSCOPY       Family History   Problem Relation Age of Onset    Cancer Mother     Other Mother         Digestive System Complications    Diabetes Father     Heart disease Father     Hypertension Father     Coronary artery disease Father    [facility] Hyperlipidemia Father       reports that he quit smoking about 49 years ago  His smoking use included cigarettes  He has a 13 00 pack-year smoking history  He has never used smokeless tobacco  He reports current alcohol use of about 2 0 standard drinks of alcohol per week  He reports that he does not use drugs  Physical Exam:   Vitals:    03/10/21 1359   BP: 130/60   BP Location: Left arm   Patient Position: Sitting   Cuff Size: Large   Weight: 79 4 kg (175 lb)   Height: 5' 8" (1 727 m)     Body mass index is 26 61 kg/m²  General: cooperative, in not acute distress  Eyes: conjunctivae pink, anicteric sclerae  ENT: lips and mucous membranes moist  Neck: supple, no JVD  Chest: clear breath sounds bilateral, no crackles, ronchus or wheezings  CVS: distinct S1 & S2, normal rate, regular rhythm  Abdomen: soft, non-tender, non-distended, normoactive bowel sounds  Back: no CVA tenderness  Extremities:  Trace ankle edema of both legs  Skin: no rash  Neuro: awake, alert, oriented      Lab Results:  Results for orders placed or performed in visit on 03/08/21   Magnesium   Result Value Ref Range    Magnesium 1 3 (L) 1 6 - 2 6 mg/dL   Phosphorus   Result Value Ref Range    Phosphorus 3 2 2 3 - 4 1 mg/dL   Basic metabolic panel   Result Value Ref Range    Sodium 139 136 - 145 mmol/L    Potassium 4 7 3 5 - 5 3 mmol/L    Chloride 111 (H) 100 - 108 mmol/L    CO2 21 21 - 32 mmol/L    ANION GAP 7 4 - 13 mmol/L    BUN 34 (H) 5 - 25 mg/dL    Creatinine 2 01 (H) 0 60 - 1 30 mg/dL    Glucose, Fasting 115 (H) 65 - 99 mg/dL    Calcium 8 8 8 3 - 10 1 mg/dL    eGFR 30 ml/min/1 73sq m       Results from last 7 days   Lab Units 03/08/21  0923   SODIUM mmol/L 139   POTASSIUM mmol/L 4 7   CHLORIDE mmol/L 111*   CO2 mmol/L 21   BUN mg/dL 34*   CREATININE mg/dL 2 01*   CALCIUM mg/dL 8 8   MAGNESIUM mg/dL 1 3*   PHOSPHORUS mg/dL 3 2           Portions of the record may have been created with voice recognition software   Occasional wrong word or "sound a like" substitutions may have occurred due to the inherent limitations of voice recognition software   Read the chart carefully and recognize,

## 2021-03-10 ENCOUNTER — OFFICE VISIT (OUTPATIENT)
Dept: NEPHROLOGY | Facility: CLINIC | Age: 81
End: 2021-03-10
Payer: MEDICARE

## 2021-03-10 VITALS
BODY MASS INDEX: 26.52 KG/M2 | SYSTOLIC BLOOD PRESSURE: 130 MMHG | DIASTOLIC BLOOD PRESSURE: 60 MMHG | HEIGHT: 68 IN | WEIGHT: 175 LBS

## 2021-03-10 DIAGNOSIS — N17.9 ACUTE KIDNEY INJURY (HCC): Primary | ICD-10-CM

## 2021-03-10 DIAGNOSIS — N18.32 STAGE 3B CHRONIC KIDNEY DISEASE (HCC): ICD-10-CM

## 2021-03-10 DIAGNOSIS — E83.42 HYPOMAGNESEMIA: ICD-10-CM

## 2021-03-10 DIAGNOSIS — I25.5 ISCHEMIC CARDIOMYOPATHY: ICD-10-CM

## 2021-03-10 DIAGNOSIS — I10 BENIGN ESSENTIAL HYPERTENSION: ICD-10-CM

## 2021-03-10 DIAGNOSIS — I10 ESSENTIAL HYPERTENSION, BENIGN: ICD-10-CM

## 2021-03-10 PROCEDURE — 99214 OFFICE O/P EST MOD 30 MIN: CPT | Performed by: NURSE PRACTITIONER

## 2021-03-11 ENCOUNTER — TELEMEDICINE (OUTPATIENT)
Dept: GERIATRICS | Age: 81
End: 2021-03-11
Payer: MEDICARE

## 2021-03-11 DIAGNOSIS — I10 BENIGN ESSENTIAL HYPERTENSION: ICD-10-CM

## 2021-03-11 DIAGNOSIS — F32.A ANXIETY AND DEPRESSION: Primary | ICD-10-CM

## 2021-03-11 DIAGNOSIS — F41.9 ANXIETY AND DEPRESSION: Primary | ICD-10-CM

## 2021-03-11 DIAGNOSIS — E11.59 TYPE 2 DIABETES MELLITUS WITH OTHER CIRCULATORY COMPLICATION, WITHOUT LONG-TERM CURRENT USE OF INSULIN (HCC): ICD-10-CM

## 2021-03-11 DIAGNOSIS — N18.32 STAGE 3B CHRONIC KIDNEY DISEASE (HCC): ICD-10-CM

## 2021-03-11 DIAGNOSIS — R26.2 AMBULATORY DYSFUNCTION: ICD-10-CM

## 2021-03-11 PROCEDURE — 99344 HOME/RES VST NEW MOD MDM 60: CPT | Performed by: NURSE PRACTITIONER

## 2021-03-12 ENCOUNTER — TELEPHONE (OUTPATIENT)
Dept: INTERNAL MEDICINE CLINIC | Facility: CLINIC | Age: 81
End: 2021-03-12

## 2021-03-12 VITALS — OXYGEN SATURATION: 97 % | SYSTOLIC BLOOD PRESSURE: 110 MMHG | HEART RATE: 60 BPM | DIASTOLIC BLOOD PRESSURE: 70 MMHG

## 2021-03-12 NOTE — TELEPHONE ENCOUNTER
Called Katerina Monroy and let him know that records were received and confirmed appointment on 4/8 with Dr Ramon Espino

## 2021-03-12 NOTE — TELEPHONE ENCOUNTER
MG- Occupational therapist, Campos Olmedo with  [facility] is calling to inform Stephan Bello that she evaluated the patient today and occupational services are no longer needed for the patient  If there are any questions, Glenn Jo can be reached back at [phone]      Thank you

## 2021-03-13 NOTE — ASSESSMENT & PLAN NOTE
· Multiple factors  · Pt lives at home with wife  · Ambulates with cane in home and currently weaning off cane with PT help  · At this time pt is with [facility] for PT care only

## 2021-03-13 NOTE — PROGRESS NOTES
Virtual Regular Visit      Assessment/Plan:    Problem List Items Addressed This Visit        Endocrine    DMII (diabetes mellitus, type 2) ([facility] [address] )     · Well controlled  · Continue with current metformin  · Monitor BS daily and keep log   · Pt to f/u with PCP if any issues with BS  Lab Results   Component Value Date    HGBA1C 6 0 (H) 02/18/2021               Cardiovascular and Mediastinum    Benign essential hypertension     · Controlled  · Continue with metoprolol  · Monitor BP            Genitourinary    Stage 3 chronic kidney disease     Lab Results   Component Value Date    EGFR 30 03/08/2021    EGFR 31 02/25/2021    EGFR 32 02/24/2021    CREATININE 2 01 (H) 03/08/2021    CREATININE 1 97 (H) 02/25/2021    CREATININE 1 94 (H) 02/24/2021 ·   renal function slow decline  · Dr Leonor Valadez following pt renal labs closely, pt has lab slip in home  Recommends to be evaluated by nephrology  ·             Other    Anxiety and depression - Primary     · Doing well with his mood  · Lives with supportive wife  · continue with current celexa dose  · Followed closely by PCP         Ambulatory dysfunction     · Multiple factors  · Pt lives at home with wife  · Ambulates with cane in home and currently weaning off cane with PT help  · At this time pt is with [facility] for PT care only                    Reason for visit is   Chief Complaint   Patient presents with    Virtual Regular Visit        Encounter provider LUKE Champion    Provider located at [address] [place] [address] Road  [address] [place]  [facility] [address]      Recent Visits  Date Type Provider Dept   03/11/21 Telemedicine Della Champion 17   Showing recent visits within past 7 days and meeting all other requirements     Future Appointments  No visits were found meeting these conditions     Showing future appointments within next 150 days and meeting all other requirements        The patient was identified by name and date of birth  Harry Kaiser was informed that this is a telemedicine visit and that the visit is being conducted through [employer] and patient was informed that this is a secure, HIPAA-compliant platform  He agrees to proceed     My office door was closed  No one else was in the room  He acknowledged consent and understanding of privacy and security of the video platform  The patient has agreed to participate and understands they can discontinue the visit at any time  Patient is aware this is a billable service  Subjective  Tab Ford  Ray Suresh is an [de-identified] yr old male with past history of CKD3, DM2, HTN and CAD who was admitted to the hospital for fatigue and dehydration post surgery at [facility] for a cystotomy ileal loop diversion from the ureters  Pt was found to be septic, showing an abscess with fluid collection in the right lower quadrant of his abdomen, thought to be due to a leakage from his previous surgery and acute on chronic kidney disease  When pt became medically stable during his hospital stay he was discharged to home with [facility] RN/PT/OT  Pt currently lives with his wife who is very involved in his care  Pt stated he had no fatigue, no dyspnea with any activity, he reports his appetite is good, denies constipation or any burning with urination, he ambulates with a cane but states he is weaning off that with help of PT in home  [facility] RN states they will be discharging pt from RN service  Pt is currently a therapy only patient with [facility]  PCP SUMMER visit 3/2/21, IR visit for drain removal on 3/5/21, nephrology f/u on 3/10, future f/u for Urology on 4/8/21, Dr Kendra Rojo on 4/12/21          Past Medical History:   Diagnosis Date    Acute kidney injury ([address] )     Arthritis     Hands    Wright esophagus     BPH with obstruction/lower urinary tract symptoms     CAD (coronary artery disease)     Last assessed 09/16/15    Cancer [facility])     bladder    Cataract, acquired     Last assessed 10/10/17    Coronary artery disease     Diabetes mellitus ([facility] [address] )     NIDDM    Diabetic neuropathy ([facility] [address] )     Feet    Enlarged prostate with lower urinary tract symptoms (LUTS)     Last assessed 10/10/17    Erectile dysfunction     GERD (gastroesophageal reflux disease)     Last assessed 10/10/17    Hemoptysis     Last assessed 03/14/16    Hypercholesterolemia     Last assessed 10/10/17    Hypertension     Last assessed 10/10/17    Macular degeneration     Right eye is particularly affected    Myocardial infarction ([facility] [address] ) 1998    OAB (overactive bladder)     Testicular hypofunction     Testicular hypogonadism     Last assessed 10/10/17    Tinnitus     Umbilical hernia     Last assessed 06/18/14    Urge incontinence of urine     Wears glasses        Past Surgical History:   Procedure Laterality Date    COLONOSCOPY      CORONARY ARTERY BYPASS GRAFT  07/16/2014    ABG x 4 LIMA to LAD,SVG to diagnoal 2 SVG to OM-1, SVG to PDA, resection of partial plerual mass    CT GUIDED PERC DRAINAGE CATHETER PLACEMENT  2/19/2021    CYSTOSCOPY  2013    ESOPHAGOGASTRODUODENOSCOPY      FL RETROGRADE PYELOGRAM  12/20/2018    FL RETROGRADE PYELOGRAM  12/5/2019    INGUINAL HERNIA REPAIR Bilateral 2015    IR DRAINAGE TUBE CHECK AND/OR REMOVAL  3/5/2021    PHOTODYNAMIC THERAPY      For Barretts esophagus    ND COLONOSCOPY FLX DX W/COLLJ SPEC WHEN PFRMD N/A 4/25/2016    Procedure: COLONOSCOPY;  Surgeon: Will Diaz MD;  Location:  GI LAB; Service: Gastroenterology    ND CYSTOURETHROSCOPY,BIOPSY N/A 9/10/2020    Procedure: CYSTOSCOPY, COLLECTION OF LEFT KIDNEY CYTOLOGY, BILATERAL RETROGRADE PYELOGRAM, BLADDER WALL BIOPSIES  AND [address], RANDOM BLADDER TUMOR BIOPSIES;  Surgeon: Alexis Barton MD;  Location: [facility] MAIN OR;  Service: Urology    ND CYSTOURETHROSCOPY,FULGUR 2-5 CM LESN N/A 4/16/2020    Procedure: CYSTOSCOPY, TRANSURETHRAL RESECTION OF BLADDER TUMOR (TURBT);   Surgeon: Mike Rae MD;  Location: [facility] Main [facility];  Service: Urology    AL CYSTOURETHROSCOPY,FULGUR <0 5 CM LESN N/A 12/5/2019    Procedure: CYSTO W/TURBT AND TRANSURETHRAL PROSTATE BIOPSY AND OPENING OF BLADDER NECK CONTRACTURE, B/L Retrograde pyelogram;  Surgeon: Mike Rae MD;  Location: [facility];  Service: Urology    TONSILLECTOMY      TRANSURETHRAL RESECTION OF PROSTATE N/A 12/20/2018    Procedure: CYSTO, PHOTO SELECTIVE VAPORIZATION OF PROSTATE, B/L RETROGRADE PYELOGRAM, TURBT;  Surgeon: Mike Rae MD;  Location: [facility];  Service: Urology    UMBILICAL HERNIA REPAIR  2012    UPPER GASTROINTESTINAL ENDOSCOPY         Current Outpatient Medications   Medication Sig Dispense Refill    acetaminophen (TYLENOL) 500 mg tablet Take 1,000 mg by mouth      aspirin 81 mg chewable tablet Chew 81 mg daily      atorvastatin (LIPITOR) 40 mg tablet Take 1 tablet (40 mg total) by mouth daily at bedtime 90 tablet 3    Blood Glucose Monitoring Suppl (OneTouch Verio Flex System) w/Device KIT USE TO TEST BLOOD GLUCOSE DAILY      Cholecalciferol (VITAMIN D) 50 MCG (2000 UT) tablet Take 2,000 Units by mouth daily      citalopram (CeleXA) 10 mg tablet Take 1 tablet (10 mg total) by mouth daily (Patient taking differently: Take 20 mg by mouth daily ) 30 tablet 5    citalopram (CeleXA) 20 mg tablet Take 1 tablet (20 mg total) by mouth daily (Patient not taking: Reported on 3/10/2021) 30 tablet 3    fluticasone (FLONASE) 50 mcg/act nasal spray 2 sprays into each nostril daily (Patient not taking: Reported on 3/10/2021) 16 g 3    Lancets (OneTouch Delica Plus [id number]) MISC TEST BLOOD GLUCOSE ONCE DAILY      metFORMIN (GLUCOPHAGE-XR) 500 mg 24 hr tablet TAKE TWO TABLETS BY MOUTH TWICE A DAY WITH MEALS (Patient taking differently: Take 500 mg by mouth 2 (two) times a day with meals ) 360 tablet 0    metoprolol tartrate (LOPRESSOR) 50 mg tablet TAKE ONE TABLET BY MOUTH TWICE A  tablet 4    Multiple Vitamins-Minerals (CENTRUM SILVER 50+MEN PO) Take 1 tablet by mouth daily        Multiple Vitamins-Minerals (OCUVITE-LUTEIN PO) Take 1 tablet by mouth 2 (two) times a day Pt is taking preservision      nystatin (MYCOSTATIN) powder Apply topically 3 (three) times a day (Patient not taking: Reported on 2/18/2021) 30 g 3    Omega-3 Fatty Acids (fish oil) 1,000 mg Hold for 5 days from discharge      omeprazole (PriLOSEC) 40 MG capsule Take 40 mg by mouth daily at bedtime        OneTouch Verio test strip daily Test      Sodium Chloride Flush (Normal Saline Flush) 0 9 % SOLN       sodium chloride, PF, 0 9 % Infuse 10 mL into a venous catheter daily Please flush each drain with 10cc each daily (Patient not taking: Reported on 3/10/2021) 60 Syringe 0    traMADol (ULTRAM) 50 mg tablet Take 50 mg by mouth every 6 (six) hours as needed      vitamin E, tocopherol, 400 units capsule Take 400 Units by mouth daily       No current facility-administered medications for this visit  Allergies   Allergen Reactions    Fentanyl Hallucinations    Morphine And Related Other (See Comments)     While having an MI patient received morphine and had adverse reaction but doesn't know what happened  Review of Systems   Constitutional: Negative for chills and fever  HENT: Negative for ear pain and sore throat  Eyes: Negative for pain and visual disturbance  Respiratory: Negative for cough and shortness of breath  Cardiovascular: Negative for chest pain and palpitations  Gastrointestinal: Negative for abdominal pain, constipation, nausea and vomiting  Genitourinary: Negative for dysuria and hematuria  Musculoskeletal: Positive for gait problem  Negative for arthralgias and back pain  Skin: Negative for color change and rash  Neurological: Positive for weakness  Negative for seizures and syncope  All other systems reviewed and are negative        Video Exam    Vitals:    03/12/21 1944   BP: 110/70 Pulse: 60   SpO2: 97%       Physical Exam  Vitals signs reviewed  Constitutional:       Appearance: Normal appearance  HENT:      Head: Normocephalic and atraumatic  Eyes:      Conjunctiva/sclera: Conjunctivae normal    Neck:      Musculoskeletal: Normal range of motion  Cardiovascular:      Rate and Rhythm: Normal rate and regular rhythm  Heart sounds: Normal heart sounds, S1 normal and S2 normal  No murmur  Pulmonary:      Effort: Pulmonary effort is normal  No respiratory distress  Breath sounds: Normal breath sounds  No wheezing  Abdominal:      General: Bowel sounds are normal  There is no distension  Palpations: Abdomen is soft  Tenderness: There is no abdominal tenderness  Musculoskeletal: Normal range of motion  Comments: Generalized weakness   Skin:     General: Skin is warm and dry  Neurological:      Mental Status: He is alert  Psychiatric:         Attention and Perception: Attention normal          Mood and Affect: Mood normal          Speech: Speech normal          Behavior: Behavior normal          Thought Content: Thought content normal          Cognition and Memory: Cognition normal        Ambulation: Unassisted: yes   Walker: no Wheelchair: no   Nonambulatory: no      Basic ADLs Independent:   Hygiene: yes   Toileting: yes    Bathing: yes   Dressing: yes   Eating: yes   Transfers: yes     IADLs:   Shopping: no     Medications: no      Finances: no     Meals: no   Driving: no    Continence:   Stool: yes    Urine: yes    Advance Directives  Code status: Level 1: Full Code  Living Will on file: spouse is health care agent        VIRTUAL VISIT DISCLAIMER    Yohannes Lopez acknowledges that he has consented to an online visit or consultation   He understands that the online visit is based solely on information provided by him, and that, in the absence of a face-to-face physical evaluation by the physician, the diagnosis he receives is both limited and provisional in terms of accuracy and completeness  This is not intended to replace a full medical face-to-face evaluation by the physician  Consuelo Frias understands and accepts these terms

## 2021-03-13 NOTE — ASSESSMENT & PLAN NOTE
· Well controlled  · Continue with current metformin  · Monitor BS daily and keep log   · Pt to f/u with PCP if any issues with BS  Lab Results   Component Value Date    HGBA1C 6 0 (H) 02/18/2021

## 2021-03-13 NOTE — ASSESSMENT & PLAN NOTE
· Doing well with his mood  · Lives with supportive wife  · continue with current celexa dose  · Followed closely by PCP

## 2021-03-13 NOTE — ASSESSMENT & PLAN NOTE
Lab Results   Component Value Date    EGFR 30 03/08/2021    EGFR 31 02/25/2021    EGFR 32 02/24/2021    CREATININE 2 01 (H) 03/08/2021    CREATININE 1 97 (H) 02/25/2021    CREATININE 1 94 (H) 02/24/2021   · renal function slow decline  · Dr Kvng Parisi following pt renal labs closely, pt has lab slip in home  Recommends to be evaluated by nephrology      ·

## 2021-03-29 DIAGNOSIS — K22.70 BARRETT'S ESOPHAGUS WITH ESOPHAGITIS: Primary | ICD-10-CM

## 2021-03-29 DIAGNOSIS — K20.90 BARRETT'S ESOPHAGUS WITH ESOPHAGITIS: Primary | ICD-10-CM

## 2021-03-29 RX ORDER — OMEPRAZOLE 40 MG/1
40 CAPSULE, DELAYED RELEASE ORAL DAILY
Qty: 90 CAPSULE | Refills: 3 | Status: SHIPPED | OUTPATIENT
Start: 2021-03-29 | End: 2021-04-05 | Stop reason: SDUPTHER

## 2021-04-05 DIAGNOSIS — K22.70 BARRETT'S ESOPHAGUS WITH ESOPHAGITIS: ICD-10-CM

## 2021-04-05 DIAGNOSIS — K20.90 BARRETT'S ESOPHAGUS WITH ESOPHAGITIS: ICD-10-CM

## 2021-04-05 RX ORDER — OMEPRAZOLE 40 MG/1
40 CAPSULE, DELAYED RELEASE ORAL DAILY
Qty: 90 CAPSULE | Refills: 3 | Status: SHIPPED | OUTPATIENT
Start: 2021-04-05 | End: 2022-05-13 | Stop reason: SDUPTHER

## 2021-04-07 DIAGNOSIS — E11.59 TYPE 2 DIABETES MELLITUS WITH OTHER CIRCULATORY COMPLICATION, WITHOUT LONG-TERM CURRENT USE OF INSULIN (HCC): Primary | ICD-10-CM

## 2021-04-07 RX ORDER — BLOOD SUGAR DIAGNOSTIC
STRIP MISCELLANEOUS
Qty: 100 EACH | Refills: 0 | Status: SHIPPED | OUTPATIENT
Start: 2021-04-07 | End: 2021-06-01

## 2021-04-08 ENCOUNTER — OFFICE VISIT (OUTPATIENT)
Dept: UROLOGY | Facility: AMBULATORY SURGERY CENTER | Age: 81
End: 2021-04-08
Payer: MEDICARE

## 2021-04-08 VITALS
HEIGHT: 68 IN | WEIGHT: 168 LBS | DIASTOLIC BLOOD PRESSURE: 80 MMHG | BODY MASS INDEX: 25.46 KG/M2 | HEART RATE: 72 BPM | SYSTOLIC BLOOD PRESSURE: 162 MMHG

## 2021-04-08 DIAGNOSIS — C67.8 MALIGNANT NEOPLASM OF OVERLAPPING SITES OF BLADDER (HCC): ICD-10-CM

## 2021-04-08 DIAGNOSIS — R77.8 ELEVATED TROPONIN: ICD-10-CM

## 2021-04-08 LAB
LEFT EYE DIABETIC RETINOPATHY: NORMAL
RIGHT EYE DIABETIC RETINOPATHY: NORMAL

## 2021-04-08 PROCEDURE — 99214 OFFICE O/P EST MOD 30 MIN: CPT | Performed by: UROLOGY

## 2021-04-08 NOTE — PROGRESS NOTES
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, status post Keytruda, status post radical cysto prostatectomy with ileal conduit creation, status post drainage of anterior wall abscess      Discussion   I discussed and reviewed pathology from the [address] [place] [address] revealing invasive high-grade residual bladder cancer  All surgical margins were negative  His abscess drain has been removed  Follow-up in 6 months with a noncontrast CT scan secondary to chronic kidney disease with a creatinine of 2  I will obtain a basic metabolic panel along with a chest x-ray  At that time  We also discussed performing urethroscopy in the future  The patient is amenable with this plan and six-month follow-up  History of Present Illness  [de-identified] y o  male with a history of   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  He was initially managed by Dr Clara Rojas and Erin Garza  He then received intravenous Keytruda from Dr Lisa Hoskins  He subsequently went on to have radical cysto prostatectomy with ileal conduit creation performed in the fall of 2020 at the [facility]  More recently he developed an anterior wall pelvic abscess  This was ultimately drained in interventional radiology  His drain has since been removed  The patient and his wife discussed some challenges with the stoma bag but overall he feels that he is doing very well since surgery  Pathology revealed residual invasive high-grade urothelial carcinoma the bladder  He recently had imaging showing anterior wall abscess that was drained  There is no other sign of recurrent disease, however, the scan was performed without contrast as his creatinine level was 2  He also had a loopogram at that time which showed good reflux into both collecting systems indicative of patency of the ureteroileal anastomoses   He has requested transfer of care to me  AUA Symptom Score      Review of Systems  Review of Systems   Constitutional: Negative  HENT: Negative  Eyes: Negative  Respiratory: Negative  Cardiovascular: Negative  Gastrointestinal: Negative  Endocrine: Negative  Genitourinary:        Per HPI   Musculoskeletal: Negative  Skin: Negative  Allergic/Immunologic: Negative  Neurological: Negative  Hematological: Negative  Psychiatric/Behavioral: Negative            Past Medical History  Past Medical History:   Diagnosis Date    Acute kidney injury ([facility] )     Arthritis     Hands    Wright esophagus     BPH with obstruction/lower urinary tract symptoms     CAD (coronary artery disease)     Last assessed 09/16/15    Cancer ([facility] )     bladder    Cataract, acquired     Last assessed 10/10/17    Coronary artery disease     Diabetes mellitus ([facility] )     NIDDM    Diabetic neuropathy ([facility] )     Feet    Enlarged prostate with lower urinary tract symptoms (LUTS)     Last assessed 10/10/17    Erectile dysfunction     GERD (gastroesophageal reflux disease)     Last assessed 10/10/17    Hemoptysis     Last assessed 03/14/16    Hypercholesterolemia     Last assessed 10/10/17    Hypertension     Last assessed 10/10/17    Macular degeneration     Right eye is particularly affected    Myocardial infarction ([facility] ) 1998    OAB (overactive bladder)     Testicular hypofunction     Testicular hypogonadism     Last assessed 10/10/17    Tinnitus     Umbilical hernia     Last assessed 06/18/14    Urge incontinence of urine     Wears glasses        Past Social History  Past Surgical History:   Procedure Laterality Date    COLONOSCOPY      CORONARY ARTERY BYPASS GRAFT  07/16/2014    ABG x 4 LIMA to LAD,SVG to diagnoal 2 SVG to OM-1, SVG to PDA, resection of partial plerual mass    CT GUIDED PERC DRAINAGE CATHETER PLACEMENT  2/19/2021    CYSTOSCOPY  2013    ESOPHAGOGASTRODUODENOSCOPY      FL RETROGRADE PYELOGRAM  12/20/2018    FL RETROGRADE PYELOGRAM  12/5/2019    INGUINAL HERNIA REPAIR Bilateral 2015    IR DRAINAGE TUBE CHECK AND/OR REMOVAL  3/5/2021    PHOTODYNAMIC THERAPY      For Barretts esophagus    NE COLONOSCOPY FLX DX W/COLLJ SPEC WHEN PFRMD N/A 4/25/2016    Procedure: COLONOSCOPY;  Surgeon: Deon Curry MD;  Location:  GI LAB; Service: Gastroenterology    NE CYSTOURETHROSCOPY,BIOPSY N/A 9/10/2020    Procedure: CYSTOSCOPY, COLLECTION OF LEFT KIDNEY CYTOLOGY, BILATERAL RETROGRADE PYELOGRAM, BLADDER WALL BIOPSIES  AND [address], RANDOM BLADDER TUMOR BIOPSIES;  Surgeon: Edilma London MD;  Location: [facility] MAIN OR;  Service: Urology    NE CYSTOURETHROSCOPY,FULGUR 2-5 CM LESN N/A 4/16/2020    Procedure: CYSTOSCOPY, TRANSURETHRAL RESECTION OF BLADDER TUMOR (TURBT);   Surgeon: Edilma London MD;  Location: [facility];  Service: Urology    NE CYSTOURETHROSCOPY,FULGUR <0 5 CM LESN N/A 12/5/2019    Procedure: CYSTO W/TURBT AND TRANSURETHRAL PROSTATE BIOPSY AND OPENING OF BLADDER NECK CONTRACTURE, B/L Retrograde pyelogram;  Surgeon: Edilma London MD;  Location[facility];  Service: Urology    TONSILLECTOMY      TRANSURETHRAL RESECTION OF PROSTATE N/A 12/20/2018    Procedure: CYSTO, PHOTO SELECTIVE VAPORIZATION OF PROSTATE, B/L RETROGRADE PYELOGRAM, TURBT;  Surgeon: Edilma London MD;  Location[facility];  Service: Urology    UMBILICAL HERNIA REPAIR  2012    UPPER GASTROINTESTINAL ENDOSCOPY         Past Family History  Family History   Problem Relation Age of Onset    Cancer Mother     Other Mother         Digestive System Complications    Diabetes Father     Heart disease Father     Hypertension Father     Coronary artery disease Father     Hyperlipidemia Father        Past Social history  Social History     Socioeconomic History    Marital status: /Civil Union     Spouse name: Not on file    Number of children: Not on file    Years of education: Not on file   Lolly Yost Highest education level: Not on file   Occupational History    Occupation: Sales position   Social Needs    Financial resource strain: Not on file    Food insecurity     Worry: Not on file     Inability: Not on file   [employer] needs     Medical: Not on file     Non-medical: Not on file   Tobacco Use    Smoking status: Former Smoker     Packs/day: 1 00     Years: 13 00     Pack years: 13 00     Types: Cigarettes     Quit date:      Years since quittin 3    Smokeless tobacco: Never Used   Substance and Sexual Activity    Alcohol use:  Yes     Alcohol/week: 2 0 standard drinks     Types: 1 Glasses of wine, 1 Cans of beer per week     Drinks per session: 1 or 2     Binge frequency: Daily or almost daily     Comment: 1 drink daily- a mixed drink or wine Last 2020    Drug use: No    Sexual activity: Not Currently   Lifestyle    Physical activity     Days per week: Not on file     Minutes per session: Not on file    Stress: Not on file   Relationships    Social connections     Talks on phone: Not on file     Gets together: Not on file     Attends Christian service: Not on file     Active member of club or organization: Not on file     Attends meetings of clubs or organizations: Not on file     Relationship status: Not on file    Intimate partner violence     Fear of current or ex partner: Not on file     Emotionally abused: Not on file     Physically abused: Not on file     Forced sexual activity: Not on file   Other Topics Concern    Not on file   Social History Narrative    Always uses seatbelt        Caffeine use- Drinks 2 cups of coffee daily       Current Medications  Current Outpatient Medications   Medication Sig Dispense Refill    acetaminophen (TYLENOL) 500 mg tablet Take 1,000 mg by mouth      aspirin 81 mg chewable tablet Chew 81 mg daily      atorvastatin (LIPITOR) 40 mg tablet Take 1 tablet (40 mg total) by mouth daily at bedtime 90 tablet 3    Blood Glucose Monitoring Suppl (OneTouch Verio Flex System) w/Device KIT USE TO TEST BLOOD GLUCOSE DAILY      Cholecalciferol (VITAMIN D) 50 MCG (2000 UT) tablet Take 2,000 Units by mouth daily      citalopram (CeleXA) 10 mg tablet Take 1 tablet (10 mg total) by mouth daily (Patient taking differently: Take 20 mg by mouth daily ) 30 tablet 5    Lancets (OneTouch Delica Plus [id number]) MISC TEST BLOOD GLUCOSE ONCE DAILY      metFORMIN (GLUCOPHAGE-XR) 500 mg 24 hr tablet TAKE TWO TABLETS BY MOUTH TWICE A DAY WITH MEALS (Patient taking differently: Take 500 mg by mouth 2 (two) times a day with meals ) 360 tablet 0    metoprolol tartrate (LOPRESSOR) 50 mg tablet TAKE ONE TABLET BY MOUTH TWICE A  tablet 4    Multiple Vitamins-Minerals (CENTRUM SILVER 50+MEN PO) Take 1 tablet by mouth daily        Multiple Vitamins-Minerals (OCUVITE-LUTEIN PO) Take 1 tablet by mouth 2 (two) times a day Pt is taking preservision      nystatin (MYCOSTATIN) powder Apply topically 3 (three) times a day 30 g 3    Omega-3 Fatty Acids (fish oil) 1,000 mg Hold for 5 days from discharge      omeprazole (PriLOSEC) 40 MG capsule Take 1 capsule (40 mg total) by mouth daily 90 capsule 3    OneTouch Verio test strip TEST ONCE A  each 0    Sodium Chloride Flush (Normal Saline Flush) 0 9 % SOLN       sodium chloride, PF, 0 9 % Infuse 10 mL into a venous catheter daily Please flush each drain with 10cc each daily 60 Syringe 0    traMADol (ULTRAM) 50 mg tablet Take 50 mg by mouth every 6 (six) hours as needed      vitamin E, tocopherol, 400 units capsule Take 400 Units by mouth daily      citalopram (CeleXA) 20 mg tablet Take 1 tablet (20 mg total) by mouth daily (Patient not taking: Reported on 3/10/2021) 30 tablet 3    fluticasone (FLONASE) 50 mcg/act nasal spray 2 sprays into each nostril daily (Patient not taking: Reported on 3/10/2021) 16 g 3     No current facility-administered medications for this visit          Allergies  Allergies   Allergen Reactions    Fentanyl Hallucinations    Morphine And Related Other (See Comments)     While having an MI patient received morphine and had adverse reaction but doesn't know what happened  Past Medical History, Social History, Family History, medications and allergies were reviewed  Vitals  Vitals:    04/08/21 0951   BP: 162/80   BP Location: Left arm   Patient Position: Sitting   Cuff Size: Adult   Pulse: 72   Weight: 76 2 kg (168 lb)   Height: 5' 8" (1 727 m)       Physical Exam  Physical Exam    On examination he is in no acute distress  His abdomen is soft nontender nondistended  His incision is well healed  Ileal conduit in the right mid abdomen is pink and patent with copious clear yellow urine  Skin is warm  Extremities without edema    Neurologic is grossly intact and nonfocal   Gait normal   Affect normal      Results  Lab Results   Component Value Date    PSA 0 4 09/19/2018    PSA 0 4 10/04/2017    PSA 0 4 10/17/2016     Lab Results   Component Value Date    GLUCOSE 128 08/13/2015    CALCIUM 8 8 03/08/2021     08/13/2015    K 4 7 03/08/2021    CO2 21 03/08/2021     (H) 03/08/2021    BUN 34 (H) 03/08/2021    CREATININE 2 01 (H) 03/08/2021     Lab Results   Component Value Date    WBC 17 00 (H) 02/26/2021    HGB 9 3 (L) 02/26/2021    HCT 30 3 (L) 02/26/2021     (H) 02/26/2021     02/26/2021         Office Urine Dip  No results found for this or any previous visit (from the past 1 hour(s)) ]

## 2021-04-12 ENCOUNTER — OFFICE VISIT (OUTPATIENT)
Dept: INTERNAL MEDICINE CLINIC | Facility: CLINIC | Age: 81
End: 2021-04-12
Payer: MEDICARE

## 2021-04-12 VITALS
WEIGHT: 170 LBS | OXYGEN SATURATION: 98 % | HEIGHT: 68 IN | BODY MASS INDEX: 25.76 KG/M2 | DIASTOLIC BLOOD PRESSURE: 70 MMHG | SYSTOLIC BLOOD PRESSURE: 124 MMHG | HEART RATE: 57 BPM | TEMPERATURE: 97.1 F

## 2021-04-12 DIAGNOSIS — E66.3 OVERWEIGHT: ICD-10-CM

## 2021-04-12 DIAGNOSIS — E43 SEVERE PROTEIN-CALORIE MALNUTRITION (HCC): ICD-10-CM

## 2021-04-12 DIAGNOSIS — N17.9 ACUTE KIDNEY INJURY (HCC): ICD-10-CM

## 2021-04-12 DIAGNOSIS — F41.9 ANXIETY AND DEPRESSION: ICD-10-CM

## 2021-04-12 DIAGNOSIS — F32.A ANXIETY AND DEPRESSION: ICD-10-CM

## 2021-04-12 DIAGNOSIS — E11.59 TYPE 2 DIABETES MELLITUS WITH OTHER CIRCULATORY COMPLICATION, WITHOUT LONG-TERM CURRENT USE OF INSULIN (HCC): ICD-10-CM

## 2021-04-12 DIAGNOSIS — I25.10 CORONARY ARTERY DISEASE INVOLVING NATIVE CORONARY ARTERY OF NATIVE HEART WITHOUT ANGINA PECTORIS: Primary | ICD-10-CM

## 2021-04-12 DIAGNOSIS — I10 BENIGN ESSENTIAL HYPERTENSION: ICD-10-CM

## 2021-04-12 DIAGNOSIS — D49.4 BLADDER TUMOR: ICD-10-CM

## 2021-04-12 PROBLEM — E66.9 OBESITY (BMI 30.0-34.9): Status: RESOLVED | Noted: 2019-01-22 | Resolved: 2021-04-12

## 2021-04-12 PROCEDURE — 99214 OFFICE O/P EST MOD 30 MIN: CPT | Performed by: INTERNAL MEDICINE

## 2021-04-12 NOTE — ASSESSMENT & PLAN NOTE
As noted patient's pressure showing  Relatively good control     Does occasionally check this home told if any episodes of either hypo hypertension  Or symptomatic with lightheadedness dizziness headache to please call  Patient will continue present treatment and he is scheduled to repeat a basic metabolic profile looking at his renal function in the near future

## 2021-04-12 NOTE — ASSESSMENT & PLAN NOTE
With recent complications after surgery patient did acute renal injury  Again patient has a slip check on labs looking at his renal function that was given to him by his urologist   We will continue to monitor this

## 2021-04-12 NOTE — PROGRESS NOTES
Assessment/Plan:    Benign essential hypertension  As noted patient's pressure showing  Relatively good control     Does occasionally check this home told if any episodes of either hypo hypertension  Or symptomatic with lightheadedness dizziness headache to please call  Patient will continue present treatment and he is scheduled to repeat a basic metabolic profile looking at his renal function in the near future  Acute kidney injury [facility])    With recent complications after surgery patient did acute renal injury  Again patient has a slip check on labs looking at his renal function that was given to him by his urologist   We will continue to monitor this  Anxiety and depression    With previous illness patient did have problems not only with anxiety but depression  Since patient had drainage placed of accumulation of fluid, infection to the peritoneum states that he is feeling much better emotionally as well as physically  This point we will continue with citalopram as previously    Bladder tumor   Patient does have a history of malignant bladder tumor, status post total removal of his bladder, ileal loop diversion period postoperatively did have complications with abscess the abdomen which was drained, antibiotics or stool started and patient has done extremely well  Drainage has been removed  States he has no abdominal pain  Passing clear urine  To continue to follow-up with Urology  Call if any changes    Coronary artery disease involving native coronary artery of native heart without angina pectoris   Patient does have a longstanding history of coronary artery disease  In reviewing the patient's record he has not been seen by his cardiologist in a prolonged period of time  New consult was initiated and hopefully they will contact patient for follow-up visit to be seen by Cardiology  Patient remains on atorvastatin 40 mg a day    States he is not always perfect with his diet but now that he is feeling better is watching this little bit more closely  DMII (diabetes mellitus, type 2) ([facility] [address] )    Lab Results   Component Value Date    HGBA1C 6 0 (H) 02/18/2021     Patient is watching his sugar readings at home  He has not had a follow-up visit be seen by his endocrinologist but his daughter has been keeping in close contact  Patient is checking his blood sugars at home  We will be checking hemoglobin A1c later this month  Make adjustment to medication depending on the results and with consultation evaluation by endocrinology  Overweight    With recent infection patient did lose considerable amount weight period is now considered just very slightly overweight  I do not feel the patient is a candidate for any changes as far as diet is concerned  We have stressed to the patient the importance of increasing his mobility, activity level as he is able  Severe protein-calorie malnutrition ([facility] [address] )  Malnutrition Findings:           BMI Findings: Body mass index is 25 85 kg/m²  Patient's BMI is now in the category of overweight  He states he has regaining he has appetite completely period not always making the best food choices  Diagnoses and all orders for this visit:    Coronary artery disease involving native coronary artery of native heart without angina pectoris  -     Cancel: Ambulatory referral to Cardiology; Future  -     Ambulatory referral to Cardiology; Future    Benign essential hypertension    Type 2 diabetes mellitus with other circulatory complication, without long-term current use of insulin (HCC)  -     Hemoglobin A1C; Future    Bladder tumor    Acute kidney injury ([facility] [address] )    Anxiety and depression    Overweight    Severe protein-calorie malnutrition (HCC)          Subjective:      Patient ID: Sheryl Han is a [de-identified] y o  male  78-year-old male his history of multiple medical problems as outlined previously  Patient is here today for follow-up    Patient had recent surgery for his bladder cancer, resection of his bladder with ileal loop diversion period postoperatively complication of abscess in the lower quadrant this was subsequently drained  Patient did have substantial decline in his status until definitive treatment was performed and patient now states he is doing much better with increased appetite, no abdominal pain  Is slowly increasing his activity level  Less depression and anxiety  He continues to follow-up with Urology  Needs to be seen by Endocrinology and Cardiology in the near future  The following portions of the patient's history were reviewed and updated as appropriate: He  has a past medical history of Acute kidney injury ([address] ), Arthritis, Wright esophagus, BPH with obstruction/lower urinary tract symptoms, CAD (coronary artery disease), Cancer ([address] ), Cataract, acquired, Coronary artery disease, Diabetes mellitus ([address] ), Diabetic neuropathy ([address] ), Enlarged prostate with lower urinary tract symptoms (LUTS), Erectile dysfunction, GERD (gastroesophageal reflux disease), Hemoptysis, Hypercholesterolemia, Hypertension, Macular degeneration, Myocardial infarction ([facility] [address] ) (1998), OAB (overactive bladder), Testicular hypofunction, Testicular hypogonadism, Tinnitus, Umbilical hernia, Urge incontinence of urine, and Wears glasses    He   Patient Active Problem List    Diagnosis Date Noted    Hypomagnesemia 03/09/2021    Severe protein-calorie malnutrition ([address] ) 02/19/2021    Sepsis ([facility] [address] ) 02/19/2021    Right sided Pelvic abscess in male [facility]) 02/18/2021    Ambulatory dysfunction 02/16/2021    Frequent falls 02/16/2021    Anxiety and depression 12/22/2020    Overweight 12/22/2020    Fungal dermatitis 12/03/2020    Stage 3 chronic kidney disease 09/09/2020    Forearm mass, left 09/03/2020    Elevated troponin 07/21/2020    Liver function study, abnormal 06/25/2020    Malignant neoplasm of urinary bladder neck ([address] ) 03/24/2020    Positive urinary cytology 11/05/2019    Coronary artery disease involving native coronary artery of native heart without angina pectoris 09/09/2019    Ischemic cardiomyopathy 09/09/2019    History of bladder cancer 07/30/2019    Medicare annual wellness visit, subsequent 05/23/2019    Closed fracture of upper end of right fibula 03/11/2019    Malignant neoplasm of overlapping sites of bladder ([facility] ) 01/10/2019    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/2019    Type 2 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 ([place] [address] ) 12/05/2018    Bladder tumor 11/01/2018    Overactive bladder 10/10/2018    Acute kidney injury ([place] [address] ) 05/30/2018    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    Backache 10/21/2013    Benign essential hypertension 10/11/2012    DMII (diabetes mellitus, type 2) ([place] [address] ) 10/11/2012    Hyperlipidemia 10/11/2012     He  has a past surgical history that includes pr colonoscopy flx dx w/collj spec when pfrmd (N/A, 4/25/2016); Cystoscopy (2013); Coronary artery bypass graft (07/16/2014); Colonoscopy; Inguinal hernia repair (Bilateral, 2015); Umbilical hernia repair (2012); Esophagogastroduodenoscopy; Tonsillectomy; Photodynamic Therapy; Transurethral resection of prostate (N/A, 12/20/2018); FL retrograde pyelogram (12/20/2018); pr cystourethroscopy,fulgur <0 5 cm lesn (N/A, 12/5/2019); FL retrograde pyelogram (12/5/2019); pr cystourethroscopy,fulgur 2-5 cm lesn (N/A, 4/16/2020); Upper gastrointestinal endoscopy; pr cystourethroscopy,biopsy (N/A, 9/10/2020); CT guided perc drainage catheter placeme (2/19/2021); and IR drainage tube check and/or removal (3/5/2021)  His family history includes Cancer in his mother; Coronary artery disease in his father; Diabetes in his father; Heart disease in his father; Hyperlipidemia in his father; Hypertension in his father; Other in his mother  He  reports that he quit smoking about 49 years ago  His smoking use included cigarettes   He has a 13 00 pack-year smoking history  He has never used smokeless tobacco  He reports current alcohol use of about 2 0 standard drinks of alcohol per week  He reports that he does not use drugs    Current Outpatient Medications   Medication Sig Dispense Refill    acetaminophen (TYLENOL) 500 mg tablet Take 1,000 mg by mouth      aspirin 81 mg chewable tablet Chew 81 mg daily      atorvastatin (LIPITOR) 40 mg tablet Take 1 tablet (40 mg total) by mouth daily at bedtime 90 tablet 3    Blood Glucose Monitoring Suppl (OneTouch Verio Flex System) w/Device KIT USE TO TEST BLOOD GLUCOSE DAILY      Cholecalciferol (VITAMIN D) 50 MCG (2000 UT) tablet Take 2,000 Units by mouth daily      citalopram (CeleXA) 10 mg tablet Take 1 tablet (10 mg total) by mouth daily (Patient taking differently: Take 20 mg by mouth daily ) 30 tablet 5    Lancets (OneTouch Delica Plus [id number]) MISC TEST BLOOD GLUCOSE ONCE DAILY      metFORMIN (GLUCOPHAGE-XR) 500 mg 24 hr tablet TAKE TWO TABLETS BY MOUTH TWICE A DAY WITH MEALS (Patient taking differently: Take 500 mg by mouth 2 (two) times a day with meals ) 360 tablet 0    metoprolol tartrate (LOPRESSOR) 50 mg tablet TAKE ONE TABLET BY MOUTH TWICE A  tablet 4    Multiple Vitamins-Minerals (CENTRUM SILVER 50+MEN PO) Take 1 tablet by mouth daily        Multiple Vitamins-Minerals (OCUVITE-LUTEIN PO) Take 1 tablet by mouth 2 (two) times a day Pt is taking preservision      nystatin (MYCOSTATIN) powder Apply topically 3 (three) times a day 30 g 3    Omega-3 Fatty Acids (fish oil) 1,000 mg Hold for 5 days from discharge      omeprazole (PriLOSEC) 40 MG capsule Take 1 capsule (40 mg total) by mouth daily 90 capsule 3    OneTouch Verio test strip TEST ONCE A  each 0    Sodium Chloride Flush (Normal Saline Flush) 0 9 % SOLN       sodium chloride, PF, 0 9 % Infuse 10 mL into a venous catheter daily Please flush each drain with 10cc each daily 60 Syringe 0    traMADol (ULTRAM) 50 mg tablet Take 50 mg by mouth every 6 (six) hours as needed      vitamin E, tocopherol, 400 units capsule Take 400 Units by mouth daily      citalopram (CeleXA) 20 mg tablet Take 1 tablet (20 mg total) by mouth daily (Patient not taking: Reported on 3/10/2021) 30 tablet 3    fluticasone (FLONASE) 50 mcg/act nasal spray 2 sprays into each nostril daily (Patient not taking: Reported on 3/10/2021) 16 g 3     No current facility-administered medications for this visit        Current Outpatient Medications on File Prior to Visit   Medication Sig    acetaminophen (TYLENOL) 500 mg tablet Take 1,000 mg by mouth    aspirin 81 mg chewable tablet Chew 81 mg daily    atorvastatin (LIPITOR) 40 mg tablet Take 1 tablet (40 mg total) by mouth daily at bedtime    Blood Glucose Monitoring Suppl (OneTouch Verio Flex System) w/Device KIT USE TO TEST BLOOD GLUCOSE DAILY    Cholecalciferol (VITAMIN D) 50 MCG (2000 UT) tablet Take 2,000 Units by mouth daily    citalopram (CeleXA) 10 mg tablet Take 1 tablet (10 mg total) by mouth daily (Patient taking differently: Take 20 mg by mouth daily )    Lancets (OneTouch Delica Plus [id number]) MISC TEST BLOOD GLUCOSE ONCE DAILY    metFORMIN (GLUCOPHAGE-XR) 500 mg 24 hr tablet TAKE TWO TABLETS BY MOUTH TWICE A DAY WITH MEALS (Patient taking differently: Take 500 mg by mouth 2 (two) times a day with meals )    metoprolol tartrate (LOPRESSOR) 50 mg tablet TAKE ONE TABLET BY MOUTH TWICE A DAY    Multiple Vitamins-Minerals (CENTRUM SILVER 50+MEN PO) Take 1 tablet by mouth daily      Multiple Vitamins-Minerals (OCUVITE-LUTEIN PO) Take 1 tablet by mouth 2 (two) times a day Pt is taking preservision    nystatin (MYCOSTATIN) powder Apply topically 3 (three) times a day    Omega-3 Fatty Acids (fish oil) 1,000 mg Hold for 5 days from discharge    omeprazole (PriLOSEC) 40 MG capsule Take 1 capsule (40 mg total) by mouth daily    OneTouch Verio test strip TEST ONCE A DAY    Sodium Chloride Flush (Normal Saline Flush) 0 9 % SOLN     sodium chloride, PF, 0 9 % Infuse 10 mL into a venous catheter daily Please flush each drain with 10cc each daily    traMADol (ULTRAM) 50 mg tablet Take 50 mg by mouth every 6 (six) hours as needed    vitamin E, tocopherol, 400 units capsule Take 400 Units by mouth daily    citalopram (CeleXA) 20 mg tablet Take 1 tablet (20 mg total) by mouth daily (Patient not taking: Reported on 3/10/2021)    fluticasone (FLONASE) 50 mcg/act nasal spray 2 sprays into each nostril daily (Patient not taking: Reported on 3/10/2021)     No current facility-administered medications on file prior to visit  He is allergic to fentanyl and morphine and related       Review of Systems   Constitutional: Positive for activity change (  Patient relatesThat he is slowly increasing his activity level  Is still walking with a cane but not a walker  Increasing strength ) and appetite change ( appetite has improved  Still trying to make proper food choices and admits not always perfect)  Negative for chills, diaphoresis, fatigue, fever and unexpected weight change  HENT: Negative  Eyes: Negative  Respiratory: Negative  Cardiovascular: Negative  Gastrointestinal: Negative  Endocrine: Negative  Genitourinary:        Ostomy is draining clear urine   Musculoskeletal: Positive for arthralgias ( some mild diffuse arthritic aches and pains but he states these have decreased appreciably  Is feeling stronger)  Negative for back pain, gait problem, joint swelling, myalgias, neck pain and neck stiffness  Skin: Negative  Allergic/Immunologic: Negative  Neurological: Positive for weakness ( admits to some continued weakness to lower extremities but again this is improving )  Negative for dizziness, tremors, seizures, syncope, facial asymmetry, speech difficulty, light-headedness, numbness and headaches  Hematological: Negative      Psychiatric/Behavioral: Negative for agitation, behavioral problems, confusion, decreased concentration, dysphoric mood, hallucinations, self-injury, sleep disturbance and suicidal ideas  The patient is nervous/anxious ( mood and affect have improved states less depressed less anxious)  The patient is not hyperactive  Objective:      /70   Pulse 57   Temp (!) 97 1 °F (36 2 °C) (Tympanic)   Ht 5' 8" (1 727 m)   Wt 77 1 kg (170 lb)   SpO2 98%   BMI 25 85 kg/m²          Physical Exam  Vitals signs and nursing note reviewed  Constitutional:       General: He is not in acute distress  Appearance: Normal appearance  He is not ill-appearing, toxic-appearing or diaphoretic  Comments: Pleasant much more cheerful alert 80-year-old male who is awake alert no acute distress and oriented x3   HENT:      Head: Normocephalic and atraumatic  Right Ear: Tympanic membrane, ear canal and external ear normal  There is no impacted cerumen  Left Ear: Tympanic membrane, ear canal and external ear normal  There is no impacted cerumen  Nose: Nose normal  No congestion or rhinorrhea  Mouth/Throat:      Mouth: Mucous membranes are moist       Pharynx: Oropharynx is clear  No oropharyngeal exudate or posterior oropharyngeal erythema  Eyes:      General: No scleral icterus  Right eye: No discharge  Left eye: No discharge  Extraocular Movements: Extraocular movements intact  Conjunctiva/sclera: Conjunctivae normal       Pupils: Pupils are equal, round, and reactive to light  Neck:      Musculoskeletal: Normal range of motion and neck supple  No neck rigidity or muscular tenderness  Vascular: No carotid bruit  Cardiovascular:      Rate and Rhythm: Regular rhythm  Bradycardia present  Heart sounds: Normal heart sounds  No murmur  No friction rub  No gallop  Comments: Decreased pulses to lower extremities  Pulmonary:      Effort: Pulmonary effort is normal  No respiratory distress        Breath sounds: Normal breath sounds  No stridor  No wheezing, rhonchi or rales  Chest:      Chest wall: No tenderness  Abdominal:      General: Bowel sounds are normal  There is no distension  Palpations: Abdomen is soft  There is no mass  Tenderness: There is no abdominal tenderness  There is no right CVA tenderness, left CVA tenderness, guarding or rebound  Hernia: No hernia is present  Comments: Ileal loop diversion with ostomy to draining clear urine   Musculoskeletal: Normal range of motion  General: No swelling, tenderness, deformity or signs of injury  Right lower leg: Edema ( trace edema bilateral lower extremities) present  Left lower leg: Edema present  Comments: Decreased muscle tone and mass to both lower extremities especially noted to the quadriceps   Lymphadenopathy:      Cervical: No cervical adenopathy  Skin:     General: Skin is warm and dry  Capillary Refill: Capillary refill takes less than 2 seconds  Coloration: Skin is not jaundiced or pale  Findings: No bruising, erythema, lesion or rash  Neurological:      General: No focal deficit present  Mental Status: He is alert and oriented to person, place, and time  Mental status is at baseline  Cranial Nerves: No cranial nerve deficit  Sensory: No sensory deficit  Motor: No weakness  Coordination: Coordination normal       Gait: Gait normal       Deep Tendon Reflexes: Reflexes abnormal ( absent patella and Achilles tendon reflexes bilaterally)  Psychiatric:         Behavior: Behavior normal          Thought Content:  Thought content normal          Judgment: Judgment normal       Comments: Much brighter affect and mood

## 2021-04-12 NOTE — ASSESSMENT & PLAN NOTE
With previous illness patient did have problems not only with anxiety but depression  Since patient had drainage placed of accumulation of fluid, infection to the peritoneum states that he is feeling much better emotionally as well as physically    This point we will continue with citalopram as previously

## 2021-04-12 NOTE — ASSESSMENT & PLAN NOTE
With recent infection patient did lose considerable amount weight period is now considered just very slightly overweight  I do not feel the patient is a candidate for any changes as far as diet is concerned  We have stressed to the patient the importance of increasing his mobility, activity level as he is able

## 2021-04-12 NOTE — ASSESSMENT & PLAN NOTE
Patient does have a longstanding history of coronary artery disease  In reviewing the patient's record he has not been seen by his cardiologist in a prolonged period of time  New consult was initiated and hopefully they will contact patient for follow-up visit to be seen by Cardiology  Patient remains on atorvastatin 40 mg a day  States he is not always perfect with his diet but now that he is feeling better is watching this little bit more closely

## 2021-04-12 NOTE — ASSESSMENT & PLAN NOTE
Lab Results   Component Value Date    HGBA1C 6 0 (H) 02/18/2021     Patient is watching his sugar readings at home  He has not had a follow-up visit be seen by his endocrinologist but his daughter has been keeping in close contact  Patient is checking his blood sugars at home  We will be checking hemoglobin A1c later this month  Make adjustment to medication depending on the results and with consultation evaluation by endocrinology

## 2021-04-12 NOTE — ASSESSMENT & PLAN NOTE
Malnutrition Findings:           BMI Findings: Body mass index is 25 85 kg/m²  Patient's BMI is now in the category of overweight  He states he has regaining he has appetite completely period not always making the best food choices

## 2021-04-12 NOTE — ASSESSMENT & PLAN NOTE
Patient does have a history of malignant bladder tumor, status post total removal of his bladder, ileal loop diversion period postoperatively did have complications with abscess the abdomen which was drained, antibiotics or stool started and patient has done extremely well  Drainage has been removed  States he has no abdominal pain  Passing clear urine  To continue to follow-up with Urology    Call if any changes

## 2021-05-07 ENCOUNTER — OFFICE VISIT (OUTPATIENT)
Dept: CARDIOLOGY CLINIC | Facility: CLINIC | Age: 81
End: 2021-05-07
Payer: MEDICARE

## 2021-05-07 VITALS
HEIGHT: 68 IN | OXYGEN SATURATION: 98 % | SYSTOLIC BLOOD PRESSURE: 146 MMHG | WEIGHT: 168.5 LBS | HEART RATE: 53 BPM | BODY MASS INDEX: 25.54 KG/M2 | DIASTOLIC BLOOD PRESSURE: 62 MMHG

## 2021-05-07 DIAGNOSIS — I25.10 CORONARY ARTERY DISEASE INVOLVING NATIVE CORONARY ARTERY OF NATIVE HEART WITHOUT ANGINA PECTORIS: ICD-10-CM

## 2021-05-07 DIAGNOSIS — I45.10 RBBB: ICD-10-CM

## 2021-05-07 DIAGNOSIS — Z95.1 HX OF CABG: ICD-10-CM

## 2021-05-07 DIAGNOSIS — I25.5 ISCHEMIC CARDIOMYOPATHY: Primary | ICD-10-CM

## 2021-05-07 DIAGNOSIS — E78.2 MIXED HYPERLIPIDEMIA: ICD-10-CM

## 2021-05-07 PROCEDURE — 99213 OFFICE O/P EST LOW 20 MIN: CPT | Performed by: INTERNAL MEDICINE

## 2021-05-07 PROCEDURE — 93000 ELECTROCARDIOGRAM COMPLETE: CPT | Performed by: INTERNAL MEDICINE

## 2021-05-07 NOTE — PROGRESS NOTES
Cardiology Follow Up    Margo Hernandez [facility]  1940  [id number]  [facility] [place] [id number] [phone]    1  Ischemic cardiomyopathy  POCT ECG   2  Coronary artery disease involving native coronary artery of native heart without angina pectoris  Ambulatory referral to Cardiology    POCT ECG   3  Hx of CABG  POCT ECG   4  Mixed hyperlipidemia     5  RBBB           Discussion/Summary: All of his assessed cardiac problems are stable  I have reviewed his medications and made no changes  No cardiac testing is ordered  RTO 1 year  Interval History: He has not had any cardiac problems since his last office visit  He is recovering from hospitalization at the end of 2/2021 for sepsis / pelvic abscess  He is active and denies CP, SOB  He describes a warm chest pressure feeling which occurs on and off, not associated with activity  He has CAD with CABG x 4 in 2014  EST and ECHO in 7/2019 were unremarkable      Hgb A1C 5 9    Patient Active Problem List   Diagnosis    Backache    Benign essential hypertension    DMII (diabetes mellitus, type 2) ([address] )    Hyperlipidemia    Wright's esophagus with esophagitis    Acute kidney injury ([address] )    Overactive bladder    Bladder tumor    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)    Hx of CABG    Malignant neoplasm of overlapping sites of bladder ([facility] )    Closed fracture of upper end of right fibula    Medicare annual wellness visit, subsequent    History of bladder cancer    Coronary artery disease involving native coronary artery of native heart without angina pectoris    Ischemic cardiomyopathy    Positive urinary cytology    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)    Liver function study, abnormal    Elevated troponin    Forearm mass, left    Stage 3 chronic kidney disease ([place] [address] )    Fungal dermatitis    Anxiety and depression    Overweight    Ambulatory dysfunction    Frequent falls    Right sided Pelvic abscess in male [facility])    Severe protein-calorie malnutrition (HCC)    Sepsis ([place] [address] )    Hypomagnesemia    RBBB     Past Medical History:   Diagnosis Date    Acute kidney injury ([place] [address] )     Arthritis     Hands    Wright esophagus     BPH with obstruction/lower urinary tract symptoms     CAD (coronary artery disease)     Last assessed 09/16/15    Cancer ([place] [address] )     bladder    Cataract, acquired     Last assessed 10/10/17    Coronary artery disease     Diabetes mellitus ([place] [address] )     NIDDM    Diabetic neuropathy ([place] [address] )     Feet    Enlarged prostate with lower urinary tract symptoms (LUTS)     Last assessed 10/10/17    Erectile dysfunction     GERD (gastroesophageal reflux disease)     Last assessed 10/10/17    Hemoptysis     Last assessed 03/14/16    Hypercholesterolemia     Last assessed 10/10/17    Hypertension     Last assessed 10/10/17    Macular degeneration     Right eye is particularly affected    Myocardial infarction ([place] [address] ) 1998    OAB (overactive bladder)     Testicular hypofunction     Testicular hypogonadism     Last assessed 10/10/17    Tinnitus     Umbilical hernia     Last assessed 06/18/14    Urge incontinence of urine     Wears glasses      Social History     Socioeconomic History    Marital status: /Civil Union     Spouse name: Not on file    Number of children: Not on file    Years of education: Not on file    Highest education level: Not on file   Occupational History    Occupation: Sales position   Social Needs    Financial resource strain: Not on file    Food insecurity     Worry: Not on file     Inability: Not on file   [email or web address] needs     Medical: Not on file     Non-medical: Not on file   Tobacco Use    Smoking status: Former Smoker     Packs/day: 1 00     Years: 13 00     Pack years: 13 00     Types: Cigarettes     Quit date: 0     Years since quittin 3    Smokeless tobacco: Never Used   Substance and Sexual Activity    Alcohol use:  Yes     Alcohol/week: 2 0 standard drinks     Types: 1 Glasses of wine, 1 Cans of beer per week     Drinks per session: 1 or 2     Binge frequency: Daily or almost daily     Comment: 1 drink daily- a mixed drink or wine Last 2020    Drug use: No    Sexual activity: Not Currently   Lifestyle    Physical activity     Days per week: Not on file     Minutes per session: Not on file    Stress: Not on file   Relationships    Social connections     Talks on phone: Not on file     Gets together: Not on file     Attends [employer] service: Not on file     Active member of club or organization: Not on file     Attends meetings of clubs or organizations: Not on file     Relationship status: Not on file    Intimate partner violence     Fear of current or ex partner: Not on file     Emotionally abused: Not on file     Physically abused: Not on file     Forced sexual activity: Not on file   Other Topics Concern    Not on file   Social History Narrative    Always uses seatbelt        Caffeine use- Drinks 2 cups of coffee daily      Family History   Problem Relation Age of Onset    Cancer Mother     Other Mother         Digestive System Complications    Diabetes Father     Heart disease Father     Hypertension Father     Coronary artery disease Father     Hyperlipidemia Father      Past Surgical History:   Procedure Laterality Date    COLONOSCOPY      CORONARY ARTERY BYPASS GRAFT  2014    ABG x 4 LIMA to LAD,SVG to diagnoal 2 SVG to OM-1, SVG to PDA, resection of partial plerual mass    CT GUIDED PERC DRAINAGE CATHETER PLACEMENT  2021    CYSTOSCOPY      ESOPHAGOGASTRODUODENOSCOPY      FL RETROGRADE PYELOGRAM  2018    FL RETROGRADE PYELOGRAM  2019    INGUINAL HERNIA REPAIR Bilateral 2015    IR DRAINAGE TUBE CHECK AND/OR REMOVAL  3/5/2021  PHOTODYNAMIC THERAPY      For Barretts esophagus    RI COLONOSCOPY FLX DX W/COLLJ SPEC WHEN PFRMD N/A 4/25/2016    Procedure: COLONOSCOPY;  Surgeon: Mayco Guido MD;  Location:  GI LAB; Service: Gastroenterology    RI CYSTOURETHROSCOPY,BIOPSY N/A 9/10/2020    Procedure: CYSTOSCOPY, COLLECTION OF LEFT KIDNEY CYTOLOGY, BILATERAL RETROGRADE PYELOGRAM, BLADDER WALL BIOPSIES  AND [address], RANDOM BLADDER TUMOR BIOPSIES;  Surgeon: Ruby Mcnamara MD;  Location: [address] [place] [address] MAIN OR;  Service: Urology    RI CYSTOURETHROSCOPY,FULGUR 2-5 CM LESN N/A 4/16/2020    Procedure: CYSTOSCOPY, TRANSURETHRAL RESECTION OF BLADDER TUMOR (TURBT);   Surgeon: Ruby Mcnamara MD;  [facility];  Service: Urology    RI CYSTOURETHROSCOPY,FULGUR <0 5 CM LESN N/A 12/5/2019    Procedure: CYSTO W/TURBT AND TRANSURETHRAL PROSTATE BIOPSY AND OPENING OF BLADDER NECK CONTRACTURE, B/L Retrograde pyelogram;  Surgeon: Ruby Mcnamara MD;  [facility];  Service: Urology    TONSILLECTOMY      TRANSURETHRAL RESECTION OF PROSTATE N/A 12/20/2018    Procedure: CYSTO, PHOTO SELECTIVE VAPORIZATION OF PROSTATE, B/L RETROGRADE PYELOGRAM, TURBT;  Surgeon: Ruby Mcnamara MD;  [facility];  Service: Urology    UMBILICAL HERNIA REPAIR  2012    UPPER GASTROINTESTINAL ENDOSCOPY         Current Outpatient Medications:     acetaminophen (TYLENOL) 500 mg tablet, Take 1,000 mg by mouth every 6 (six) hours as needed for mild pain or moderate pain , Disp: , Rfl:     aspirin 81 mg chewable tablet, Chew 81 mg daily, Disp: , Rfl:     atorvastatin (LIPITOR) 40 mg tablet, Take 1 tablet (40 mg total) by mouth daily at bedtime, Disp: 90 tablet, Rfl: 3    Cholecalciferol (VITAMIN D) 50 MCG (2000 UT) tablet, Take 2,000 Units by mouth daily, Disp: , Rfl:     citalopram (CeleXA) 20 mg tablet, Take 1 tablet (20 mg total) by mouth daily, Disp: 30 tablet, Rfl: 3    metFORMIN (GLUCOPHAGE-XR) 500 mg 24 hr tablet, TAKE TWO TABLETS BY MOUTH TWICE A DAY WITH MEALS (Patient taking differently: Take 500 mg by mouth 2 (two) times a day with meals ), Disp: 360 tablet, Rfl: 0    metoprolol tartrate (LOPRESSOR) 50 mg tablet, TAKE ONE TABLET BY MOUTH TWICE A DAY, Disp: 180 tablet, Rfl: 4    Multiple Vitamins-Minerals (OCUVITE-LUTEIN PO), Take 1 tablet by mouth 2 (two) times a day Pt is taking preservision, Disp: , Rfl:     Omega-3 Fatty Acids (fish oil) 1,000 mg, Take 1,000 mg by mouth daily , Disp: , Rfl:     omeprazole (PriLOSEC) 40 MG capsule, Take 1 capsule (40 mg total) by mouth daily, Disp: 90 capsule, Rfl: 3    vitamin E, tocopherol, 400 units capsule, Take 400 Units by mouth daily, Disp: , Rfl:     Blood Glucose Monitoring Suppl (OneTouch Verio Flex System) w/Device KIT, USE TO TEST BLOOD GLUCOSE DAILY, Disp: , Rfl:     citalopram (CeleXA) 10 mg tablet, Take 1 tablet (10 mg total) by mouth daily (Patient not taking: Reported on 5/7/2021), Disp: 30 tablet, Rfl: 5    fluticasone (FLONASE) 50 mcg/act nasal spray, 2 sprays into each nostril daily (Patient not taking: Reported on 3/10/2021), Disp: 16 g, Rfl: 3    Lancets (OneTouch Delica Plus [id number]) MISC, TEST BLOOD GLUCOSE ONCE DAILY, Disp: , Rfl:     Multiple Vitamins-Minerals (CENTRUM SILVER 50+MEN PO), Take 1 tablet by mouth daily  , Disp: , Rfl:     nystatin (MYCOSTATIN) powder, Apply topically 3 (three) times a day, Disp: 30 g, Rfl: 3    OneTouch Verio test strip, TEST ONCE A DAY, Disp: 100 each, Rfl: 0    Sodium Chloride Flush (Normal Saline Flush) 0 9 % SOLN, , Disp: , Rfl:     sodium chloride, PF, 0 9 %, Infuse 10 mL into a venous catheter daily Please flush each drain with 10cc each daily, Disp: 60 Syringe, Rfl: 0    traMADol (ULTRAM) 50 mg tablet, Take 50 mg by mouth every 6 (six) hours as needed, Disp: , Rfl:   Allergies   Allergen Reactions    Fentanyl Hallucinations    Morphine And Related Other (See Comments)     While having an MI patient received morphine and had adverse reaction but doesn't know what happened  Vitals:    05/07/21 1135   BP: 146/62   BP Location: Right arm   Patient Position: Sitting   Cuff Size: Standard   Pulse: (!) 53   SpO2: 98%   Weight: 76 4 kg (168 lb 8 oz)   Height: 5' 8" (1 727 m)     Weight (last 2 days)     Date/Time   Weight    05/07/21 1135   76 4 (168 5)             Blood pressure 146/62, pulse (!) 53, height 5' 8" (1 727 m), weight 76 4 kg (168 lb 8 oz), SpO2 98 %  , Body mass index is 25 62 kg/m²  Labs:  Orders Only on 04/08/2021   Component Date Value    Right Eye Diabetic Retin* 04/08/2021 Mild     Left Eye Diabetic Retino* 04/08/2021 Mild    Lab on 03/08/2021   Component Date Value    Magnesium 03/08/2021 1 3*    Phosphorus 03/08/2021 3 2     Sodium 03/08/2021 139     Potassium 03/08/2021 4 7     Chloride 03/08/2021 111*    CO2 03/08/2021 21     ANION GAP 03/08/2021 7     BUN 03/08/2021 34*    Creatinine 03/08/2021 2 01*    Glucose, Fasting 03/08/2021 115*    Calcium 03/08/2021 8 8     eGFR 03/08/2021 30    No results displayed because visit has over 200 results        Lab on 01/25/2021   Component Date Value    Sodium 01/25/2021 136     Potassium 01/25/2021 4 6     Chloride 01/25/2021 114*    CO2 01/25/2021 16*    ANION GAP 01/25/2021 6     BUN 01/25/2021 49*    Creatinine 01/25/2021 2 27*    Glucose, Fasting 01/25/2021 125*    Calcium 01/25/2021 8 7     eGFR 01/25/2021 26     Hemoglobin A1C 01/25/2021 5 9*    EAG 01/25/2021 123     WBC 01/25/2021 10 74*    RBC 01/25/2021 3 87*    Hemoglobin 01/25/2021 12 3     Hematocrit 01/25/2021 37 3     MCV 01/25/2021 96     MCH 01/25/2021 31 8     MCHC 01/25/2021 33 0     RDW 01/25/2021 15 1     MPV 01/25/2021 11 6     Platelets 10/29/7276 207     nRBC 01/25/2021 0     Neutrophils Relative 01/25/2021 66     Immat GRANS % 01/25/2021 1     Lymphocytes Relative 01/25/2021 18     Monocytes Relative 01/25/2021 12     Eosinophils Relative 01/25/2021 2     Basophils Relative 01/25/2021 1     Neutrophils Absolute 01/25/2021 7 16     Immature Grans Absolute 01/25/2021 0 07     Lymphocytes Absolute 01/25/2021 1 98     Monocytes Absolute 01/25/2021 1 23*    Eosinophils Absolute 01/25/2021 0 24     Basophils Absolute 01/25/2021 0 06    Ancillary Orders on 01/15/2021   Component Date Value    TSH 3RD GENERATON 01/25/2021 1 300    Lab Requisition on 11/26/2020   Component Date Value    Sodium 11/26/2020 138     Potassium 11/26/2020 5 4*    Chloride 11/26/2020 113*    CO2 11/26/2020 20*    ANION GAP 11/26/2020 5     BUN 11/26/2020 33*    Creatinine 11/26/2020 1 23     Glucose, Fasting 11/26/2020 117*    Calcium 11/26/2020 10 1     Corrected Calcium 11/26/2020 10 7*    AST 11/26/2020 17     ALT 11/26/2020 24     Alkaline Phosphatase 11/26/2020 94     Total Protein 11/26/2020 6 6     Albumin 11/26/2020 3 2*    Total Bilirubin 11/26/2020 0 48     eGFR 11/26/2020 55    Appointment on 11/26/2020   Component Date Value    Sodium 11/26/2020 138     Potassium 11/26/2020 5 3     Chloride 11/26/2020 114*    CO2 11/26/2020 19*    ANION GAP 11/26/2020 5     BUN 11/26/2020 32*    Creatinine 11/26/2020 1 19     Glucose, Fasting 11/26/2020 116*    Calcium 11/26/2020 10 2*    eGFR 11/26/2020 57     Hemoglobin A1C 11/26/2020 5 9*    EAG 11/26/2020 123     WBC 11/26/2020 10 79*    RBC 11/26/2020 3 80*    Hemoglobin 11/26/2020 12 0     Hematocrit 11/26/2020 36 8     MCV 11/26/2020 97     MCH 11/26/2020 31 6     MCHC 11/26/2020 32 6     RDW 11/26/2020 15 4*    MPV 11/26/2020 12 6     Platelets 47/88/1771 188     nRBC 11/26/2020 0     Neutrophils Relative 11/26/2020 72     Immat GRANS % 11/26/2020 1     Lymphocytes Relative 11/26/2020 13*    Monocytes Relative 11/26/2020 8     Eosinophils Relative 11/26/2020 5     Basophils Relative 11/26/2020 1     Neutrophils Absolute 11/26/2020 7 97*    Immature Grans Absolute 11/26/2020 0 06     Lymphocytes Absolute 11/26/2020 1 36     Monocytes Absolute 11/26/2020 0 81     Eosinophils Absolute 11/26/2020 0 53     Basophils Absolute 11/26/2020 0 06      Imaging: No results found  Review of Systems:  Review of Systems   Constitutional: Negative for diaphoresis, fatigue, fever and unexpected weight change  HENT: Negative  Respiratory: Negative for cough, shortness of breath and wheezing  Cardiovascular: Negative for chest pain, palpitations and leg swelling  Gastrointestinal: Negative for abdominal pain, diarrhea and nausea  Musculoskeletal: Negative for gait problem and myalgias  Skin: Negative for rash  Neurological: Negative for dizziness and numbness  Psychiatric/Behavioral: Negative  Physical Exam:  Physical Exam  Constitutional:       Appearance: He is well-developed  HENT:      Head: Normocephalic and atraumatic  Eyes:      Pupils: Pupils are equal, round, and reactive to light  Neck:      Musculoskeletal: Normal range of motion and neck supple  Vascular: No JVD  Cardiovascular:      Rate and Rhythm: Regular rhythm  Pulses: Normal pulses  Carotid pulses are 2+ on the right side and 2+ on the left side  Heart sounds: S1 normal and S2 normal    Pulmonary:      Effort: Pulmonary effort is normal       Breath sounds: Normal breath sounds  No wheezing or rales  Abdominal:      General: Bowel sounds are normal       Palpations: Abdomen is soft  Tenderness: There is no abdominal tenderness  Musculoskeletal: Normal range of motion  General: No tenderness  Skin:     General: Skin is warm  Neurological:      Mental Status: He is alert and oriented to person, place, and time  Cranial Nerves: No cranial nerve deficit  Deep Tendon Reflexes: Reflexes are normal and symmetric

## 2021-06-01 DIAGNOSIS — E11.59 TYPE 2 DIABETES MELLITUS WITH OTHER CIRCULATORY COMPLICATION, WITHOUT LONG-TERM CURRENT USE OF INSULIN (HCC): ICD-10-CM

## 2021-06-01 RX ORDER — BLOOD-GLUCOSE METER
EACH MISCELLANEOUS
Qty: 1 KIT | Refills: 0 | Status: SHIPPED | OUTPATIENT
Start: 2021-06-01

## 2021-06-01 RX ORDER — LANCETS 33 GAUGE
EACH MISCELLANEOUS
Qty: 100 EACH | Refills: 0 | Status: SHIPPED | OUTPATIENT
Start: 2021-06-01

## 2021-06-01 RX ORDER — BLOOD SUGAR DIAGNOSTIC
STRIP MISCELLANEOUS
Qty: 100 EACH | Refills: 0 | Status: SHIPPED | OUTPATIENT
Start: 2021-06-01 | End: 2022-05-14

## 2021-06-23 DIAGNOSIS — C67.8 MALIGNANT NEOPLASM OF OVERLAPPING SITES OF BLADDER (HCC): Primary | ICD-10-CM

## 2021-06-23 RX ORDER — TRAMADOL HYDROCHLORIDE 50 MG/1
50 TABLET ORAL EVERY 6 HOURS PRN
Qty: 20 TABLET | Refills: 0 | Status: SHIPPED | OUTPATIENT
Start: 2021-06-23 | End: 2021-11-05

## 2021-07-02 DIAGNOSIS — E11.9 TYPE 2 DIABETES MELLITUS WITHOUT COMPLICATION, WITHOUT LONG-TERM CURRENT USE OF INSULIN (HCC): ICD-10-CM

## 2021-07-05 RX ORDER — METFORMIN HYDROCHLORIDE 500 MG/1
TABLET, EXTENDED RELEASE ORAL
Qty: 360 TABLET | Refills: 0 | Status: SHIPPED | OUTPATIENT
Start: 2021-07-05 | End: 2022-05-19

## 2021-07-07 ENCOUNTER — APPOINTMENT (OUTPATIENT)
Dept: LAB | Facility: CLINIC | Age: 81
End: 2021-07-07
Payer: MEDICARE

## 2021-07-07 DIAGNOSIS — N18.32 STAGE 3B CHRONIC KIDNEY DISEASE (HCC): ICD-10-CM

## 2021-07-07 DIAGNOSIS — C67.8 MALIGNANT NEOPLASM OF OVERLAPPING SITES OF BLADDER (HCC): ICD-10-CM

## 2021-07-07 DIAGNOSIS — E11.59 TYPE 2 DIABETES MELLITUS WITH OTHER CIRCULATORY COMPLICATION, WITHOUT LONG-TERM CURRENT USE OF INSULIN (HCC): ICD-10-CM

## 2021-07-07 DIAGNOSIS — C67.9 BLADDER CARCINOMA (HCC): ICD-10-CM

## 2021-07-07 DIAGNOSIS — I10 BENIGN ESSENTIAL HYPERTENSION: ICD-10-CM

## 2021-07-07 DIAGNOSIS — N18.30 STAGE 3 CHRONIC KIDNEY DISEASE, UNSPECIFIED WHETHER STAGE 3A OR 3B CKD (HCC): ICD-10-CM

## 2021-07-07 LAB
CREAT UR-MCNC: 57 MG/DL
EST. AVERAGE GLUCOSE BLD GHB EST-MCNC: 126 MG/DL
HBA1C MFR BLD: 6 %
MAGNESIUM SERPL-MCNC: 1 MG/DL (ref 1.6–2.6)
PHOSPHATE SERPL-MCNC: 3.5 MG/DL (ref 2.3–4.1)
PROT UR-MCNC: 38 MG/DL
PROT/CREAT UR: 0.67 MG/G{CREAT} (ref 0–0.1)
PTH-INTACT SERPL-MCNC: 27.8 PG/ML (ref 18.4–80.1)

## 2021-07-07 PROCEDURE — 84100 ASSAY OF PHOSPHORUS: CPT

## 2021-07-07 PROCEDURE — 83036 HEMOGLOBIN GLYCOSYLATED A1C: CPT

## 2021-07-07 PROCEDURE — 83970 ASSAY OF PARATHORMONE: CPT

## 2021-07-07 PROCEDURE — 83735 ASSAY OF MAGNESIUM: CPT

## 2021-07-07 PROCEDURE — 84156 ASSAY OF PROTEIN URINE: CPT

## 2021-07-07 PROCEDURE — 82570 ASSAY OF URINE CREATININE: CPT

## 2021-07-09 ENCOUNTER — TELEPHONE (OUTPATIENT)
Dept: NEPHROLOGY | Facility: CLINIC | Age: 81
End: 2021-07-09

## 2021-07-09 DIAGNOSIS — E83.42 HYPOMAGNESEMIA: Primary | ICD-10-CM

## 2021-07-09 DIAGNOSIS — N18.32 STAGE 3B CHRONIC KIDNEY DISEASE (HCC): ICD-10-CM

## 2021-07-09 NOTE — TELEPHONE ENCOUNTER
----- Message from [address] sent at 7/9/2021  9:41 AM EDT -----  Hi! These labs were sent to be by Dr Nicoals Francisco  I am forwarding them to you for you are seeing him on 7/19/2021  Thanks  Kristie Valles  ----- Message -----  From: Yun Horowitz MD  Sent: 7/8/2021  10:17 PM EDT  To: Raoul Matamoros DO, LUKE Garza    Cr rising 2 4 (2 0 prior)  no obstruction on loop-o-gram   Needs nephrology follow up  Thank you    Nicole Gaviria   ----- Message -----  From: Lab, Background User  Sent: 7/7/2021  12:35 PM EDT  To: Yun Horowitz MD

## 2021-07-09 NOTE — TELEPHONE ENCOUNTER
Patient will be seeing me on July 19th  Reviewed blood work from 07/07, renal function worsened to creatinine 2 4  Magnesium level is low at 1 0  Bicarb is 19  Reviewed last office visit note, patient was started on magnesium oxide twice daily as magnesium level was 1 3 in March  At that time was started on magnesium oxide twice daily for 2 weeks  Will ask patient to start magnesium oxide again and check BMP and magnesium level in 2 weeks  Recommend oral hydration    Discussed labs with patient

## 2021-07-12 ENCOUNTER — TELEPHONE (OUTPATIENT)
Dept: NEPHROLOGY | Facility: HOSPITAL | Age: 81
End: 2021-07-12

## 2021-07-12 NOTE — TELEPHONE ENCOUNTER
----- Message from Duncan Garcia DO sent at 7/9/2021 12:58 PM EDT -----  Thank you Christie Slade for the heads up  Sasha Monroy I reviewed your note as well  Nephrology team can you make sure patient is feeling well didn't start any new medications  His ARB is on hold, doesn't look like he is on diuretics, has an ICM history  If he is urinating ok and BP are stable we can repeat a bmp and put him in for follow up  If any symptoms or concerns let me know  Also can you please do a med rec with him  Thank you     ----- Message -----  From: Sriram Angel MD  Sent: 7/8/2021  10:17 PM EDT  To: Duncan Garcia DO, LUKE Roy    Cr rising 2 4 (2 0 prior)  no obstruction on loop-o-gram   Needs nephrology follow up  Thank you    Christie Slade   ----- Message -----  From: Lab, Background User  Sent: 7/7/2021  12:35 PM EDT  To: Sriram Angel MD

## 2021-07-12 NOTE — TELEPHONE ENCOUNTER
Left message for patient to return call  Patient is scheduled with Dr Vince Starks on 7/19, and had orders for BMP and Mag in chart to be done for the appointment

## 2021-07-12 NOTE — TELEPHONE ENCOUNTER
Patient returned your call  According to note needs medication review    Please call him at [phone]

## 2021-07-13 ENCOUNTER — OFFICE VISIT (OUTPATIENT)
Dept: INTERNAL MEDICINE CLINIC | Facility: CLINIC | Age: 81
End: 2021-07-13
Payer: MEDICARE

## 2021-07-13 VITALS
SYSTOLIC BLOOD PRESSURE: 138 MMHG | HEIGHT: 68 IN | HEART RATE: 57 BPM | DIASTOLIC BLOOD PRESSURE: 72 MMHG | WEIGHT: 169 LBS | BODY MASS INDEX: 25.61 KG/M2 | OXYGEN SATURATION: 97 % | TEMPERATURE: 97.6 F

## 2021-07-13 DIAGNOSIS — F41.9 ANXIETY AND DEPRESSION: ICD-10-CM

## 2021-07-13 DIAGNOSIS — E83.42 HYPOMAGNESEMIA: ICD-10-CM

## 2021-07-13 DIAGNOSIS — D49.4 BLADDER TUMOR: ICD-10-CM

## 2021-07-13 DIAGNOSIS — E11.59 TYPE 2 DIABETES MELLITUS WITH OTHER CIRCULATORY COMPLICATION, WITHOUT LONG-TERM CURRENT USE OF INSULIN (HCC): ICD-10-CM

## 2021-07-13 DIAGNOSIS — R26.2 AMBULATORY DYSFUNCTION: ICD-10-CM

## 2021-07-13 DIAGNOSIS — F32.A ANXIETY AND DEPRESSION: ICD-10-CM

## 2021-07-13 DIAGNOSIS — I25.10 CORONARY ARTERY DISEASE INVOLVING NATIVE CORONARY ARTERY OF NATIVE HEART WITHOUT ANGINA PECTORIS: ICD-10-CM

## 2021-07-13 DIAGNOSIS — Z00.00 MEDICARE ANNUAL WELLNESS VISIT, SUBSEQUENT: ICD-10-CM

## 2021-07-13 DIAGNOSIS — E11.9 TYPE 2 DIABETES MELLITUS WITHOUT COMPLICATION, WITHOUT LONG-TERM CURRENT USE OF INSULIN (HCC): ICD-10-CM

## 2021-07-13 DIAGNOSIS — I10 BENIGN ESSENTIAL HYPERTENSION: Primary | ICD-10-CM

## 2021-07-13 PROCEDURE — 99214 OFFICE O/P EST MOD 30 MIN: CPT | Performed by: INTERNAL MEDICINE

## 2021-07-13 NOTE — ASSESSMENT & PLAN NOTE
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 which has been long-term  Patient had recent lab testing performed looking at his blood sugar control and were happy to report that his hemoglobin A1c is in an excellent range  He does admit that he is not always perfect with his diet  His daughter does work for his endocrinologist and they are aware of his blood sugar readings  Will continue on present medication specifically the metformin  He is complaining of increasing intestinal gas and occasionally loose stools minutes may be a side effect of the metformin    He is taking the extended release formula but we may have to consider changing the medication if he continues with GI problems

## 2021-07-13 NOTE — ASSESSMENT & PLAN NOTE
Patient has had some problems with anxiety and depression because of his medical abnormalities  He states now that he is healing better getting stronger he has less problems but he continues to take the citalopram as previously prescribed    He was urged to call if any change in his emotional state

## 2021-07-13 NOTE — ASSESSMENT & PLAN NOTE
Patient states that he is having some unsteadiness with gait but no recent falls  Is not walking with a cane although this is suggested  Patient in getting off the exam table today did have some imbalance and almost to fall but we were able to break his fall before there is any injury  We did suggest that he continue to do some routine exercise as prescribed by physical therapy 3 previously to help with strengthening and stability

## 2021-07-13 NOTE — ASSESSMENT & PLAN NOTE
With recent labs with his nephrologist he did have low magnesium level  Patient was told to start or placement and he has just started this recently  He was told the magnesium can cause some problems with loose stools  Again to report to us if there is difficulties and worsening symptoms    Again we might consider discussing with endocrinology stopping his metformin and placing him on another medication to help with blood sugar control

## 2021-07-13 NOTE — ASSESSMENT & PLAN NOTE
Patient's blood pressure showing adequate control  He continues to have this checked also with his specialists including this nephrologist   Patient will continue present surveillance and low-dose metoprolol    His nephrologist continues to monitor his

## 2021-07-13 NOTE — ASSESSMENT & PLAN NOTE
Patient 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, ileal loop diversion  Complicated postoperatively by an abscess which is now cleared  Patient does have some compromise in his renal function continues to follow-up with nephrology  They have told the patient he needs to continue to hydrate well    Along with Nephrology we will continue to monitor his renal function

## 2021-07-13 NOTE — TELEPHONE ENCOUNTER
I spoke to the patient, we verified his medications are correct, and made a few adjustments  He is aware he needs to repeat BMP and Mag before his appointment on 7/19

## 2021-07-13 NOTE — PROGRESS NOTES
Assessment/Plan:    Benign essential hypertension   Patient's blood pressure showing adequate control  He continues to have this checked also with his specialists including this nephrologist   Patient will continue present surveillance and low-dose metoprolol  His nephrologist continues to monitor his    Ambulatory dysfunction    Patient states that he is having some unsteadiness with gait but no recent falls  Is not walking with a cane although this is suggested  Patient in getting off the exam table today did have some imbalance and almost to fall but we were able to break his fall before there is any injury  We did suggest that he continue to do some routine exercise as prescribed by physical therapy 3 previously to help with strengthening and stability  Anxiety and depression    Patient has had some problems with anxiety and depression because of his medical abnormalities  He states now that he is healing better getting stronger he has less problems but he continues to take the citalopram as previously prescribed  He was urged to call if any change in his emotional state    Bladder tumor   Patient 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, ileal loop diversion  Complicated postoperatively by an abscess which is now cleared  Patient does have some compromise in his renal function continues to follow-up with nephrology  They have told the patient he needs to continue to hydrate well  Along with Nephrology we will continue to monitor his renal function    Coronary artery disease involving native coronary artery of native heart without angina pectoris   History of coronary artery disease in the distant past   He denies any chest pain or pressure no increasing shortness of breath exertion  He will continue follow-up with cardiology      DMII (diabetes mellitus, type 2) ([facility] )    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 which has been long-term  Patient had recent lab testing performed looking at his blood sugar control and were happy to report that his hemoglobin A1c is in an excellent range  He does admit that he is not always perfect with his diet  His daughter does work for his endocrinologist and they are aware of his blood sugar readings  Will continue on present medication specifically the metformin  He is complaining of increasing intestinal gas and occasionally loose stools minutes may be a side effect of the metformin  He is taking the extended release formula but we may have to consider changing the medication if he continues with GI problems    Hypomagnesemia   With recent labs with his nephrologist he did have low magnesium level  Patient was told to start or placement and he has just started this recently  He was told the magnesium can cause some problems with loose stools  Again to report to us if there is difficulties and worsening symptoms  Again we might consider discussing with endocrinology stopping his metformin and placing him on another medication to help with blood sugar control       Diagnoses and all orders for this visit:    Benign essential hypertension  -     Comprehensive metabolic panel; Future  -     CBC and differential; Future  -     Lipid panel; Future  -     UA (URINE) with reflex to Scope; Future    Bladder tumor    Type 2 diabetes mellitus with other circulatory complication, without long-term current use of insulin (HCC)    Type 2 diabetes mellitus without complication, without long-term current use of insulin (HCC)  -     Hemoglobin A1C; Future  -     Microalbumin / creatinine urine ratio    Medicare annual wellness visit, subsequent  -     Comprehensive metabolic panel; Future  -     CBC and differential; Future  -     Lipid panel; Future  -     UA (URINE) with reflex to Scope;  Future    Ambulatory dysfunction    Anxiety and depression    Coronary artery disease involving native coronary artery of native heart without angina pectoris    Hypomagnesemia          Subjective:      Patient ID: Jared Schaeffer is a [de-identified] y o  male  Patient is an 51-year-old male history of medical problems as outlined previously  Patient is here today for a routine follow-up  He did have labs performed prior to the visit today we did discuss the results showing good control his blood sugar readings  Patient's complaint at this point time is he still not back to to his normal self feels some imbalance with ambulation but no recent falls  States was seen by Nephrology and started him on  Magnesium supplements  Patient also states he has problems with increased intestinal gas and sometimes loose stools  The following portions of the patient's history were reviewed and updated as appropriate:   He  has a past medical history of Acute kidney injury ([facility] [address] ), Arthritis, Wright esophagus, BPH with obstruction/lower urinary tract symptoms, CAD (coronary artery disease), Cancer ([facility] [address] ), Cataract, acquired, Coronary artery disease, Diabetes mellitus ([facility] [address] ), Diabetic neuropathy ([facility] [address] ), Enlarged prostate with lower urinary tract symptoms (LUTS), Erectile dysfunction, GERD (gastroesophageal reflux disease), Hemoptysis, Hypercholesterolemia, Hypertension, Macular degeneration, Myocardial infarction ([address] ) (1998), OAB (overactive bladder), Testicular hypofunction, Testicular hypogonadism, Tinnitus, Umbilical hernia, Urge incontinence of urine, and Wears glasses    He   Patient Active Problem List    Diagnosis Date Noted    RBBB 05/07/2021    Hypomagnesemia 03/09/2021    Severe protein-calorie malnutrition ([facility] [address] ) 02/19/2021    Sepsis ([facility] [address] ) 02/19/2021    Right sided Pelvic abscess in male [facility]) 02/18/2021    Ambulatory dysfunction 02/16/2021    Frequent falls 02/16/2021    Anxiety and depression 12/22/2020    Overweight 12/22/2020    Fungal dermatitis 12/03/2020  Stage 3 chronic kidney disease ([facility] ) 09/09/2020    Forearm mass, left 09/03/2020    Elevated troponin 07/21/2020    Liver function study, abnormal 06/25/2020    Malignant neoplasm of urinary bladder neck ([facility] ) 03/24/2020    Positive urinary cytology 11/05/2019    Coronary artery disease involving native coronary artery of native heart without angina pectoris 09/09/2019    Ischemic cardiomyopathy 09/09/2019    History of bladder cancer 07/30/2019    Medicare annual wellness visit, subsequent 05/23/2019    Closed fracture of upper end of right fibula 03/11/2019    Malignant neoplasm of overlapping sites of bladder ([facility] ) 01/10/2019    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/2019    Type 2 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 ([place] [id number] ) 12/05/2018    Bladder tumor 11/01/2018    Overactive bladder 10/10/2018    Acute kidney injury ([place] [address] ) 05/30/2018    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    Backache 10/21/2013    Benign essential hypertension 10/11/2012    DMII (diabetes mellitus, type 2) ([place] [address] ) 10/11/2012    Hyperlipidemia 10/11/2012     He  has a past surgical history that includes pr colonoscopy flx dx w/collj spec when pfrmd (N/A, 4/25/2016); Cystoscopy (2013); Coronary artery bypass graft (07/16/2014); Colonoscopy; Inguinal hernia repair (Bilateral, 2015); Umbilical hernia repair (2012); Esophagogastroduodenoscopy; Tonsillectomy; Photodynamic Therapy; Transurethral resection of prostate (N/A, 12/20/2018); [place] retrograde pyelogram (12/20/2018); pr cystourethroscopy,fulgur <0 5 cm lesn (N/A, 12/5/2019); [place] retrograde pyelogram (12/5/2019); pr cystourethroscopy,fulgur 2-5 cm lesn (N/A, 4/16/2020); Upper gastrointestinal endoscopy; pr cystourethroscopy,biopsy (N/A, 9/10/2020); CT guided perc drainage catheter placeme (2/19/2021); and IR drainage tube check and/or removal (3/5/2021)    His family history includes Cancer in his mother; Coronary artery disease in his father; Diabetes in his father; Heart disease in his father; Hyperlipidemia in his father; Hypertension in his father; Other in his mother  He  reports that he quit smoking about 49 years ago  His smoking use included cigarettes  He has a 13 00 pack-year smoking history  He has never used smokeless tobacco  He reports current alcohol use of about 2 0 standard drinks of alcohol per week  He reports that he does not use drugs    Current Outpatient Medications   Medication Sig Dispense Refill    acetaminophen (TYLENOL) 500 mg tablet Take 1,000 mg by mouth every 6 (six) hours as needed for mild pain or moderate pain       aspirin 81 mg chewable tablet Chew 81 mg daily      atorvastatin (LIPITOR) 40 mg tablet Take 1 tablet (40 mg total) by mouth daily at bedtime 90 tablet 3    Blood Glucose Monitoring Suppl (OneTouch Verio Flex System) w/Device KIT USE TO TEST BLOOD GLUCOSE DAILY 1 kit 0    Cholecalciferol (VITAMIN D) 50 MCG (2000 UT) tablet Take 2,000 Units by mouth daily      citalopram (CeleXA) 20 mg tablet Take 1 tablet (20 mg total) by mouth daily 30 tablet 3    fluticasone (FLONASE) 50 mcg/act nasal spray 2 sprays into each nostril daily 16 g 3    Lancets (OneTouch Delica Plus [id number]) MISC TEST BLOOD GLUCOSE ONCE DAILY 100 each 0    magnesium oxide (MAG-OX) 400 mg Take 1 tablet (400 mg total) by mouth 2 (two) times a day 180 tablet 2    metFORMIN (GLUCOPHAGE-XR) 500 mg 24 hr tablet TAKE TWO TABLETS BY MOUTH TWICE A DAY WITH MEALS 360 tablet 0    metoprolol tartrate (LOPRESSOR) 50 mg tablet TAKE ONE TABLET BY MOUTH TWICE A  tablet 4    Multiple Vitamins-Minerals (OCUVITE-LUTEIN PO) Take 1 tablet by mouth 2 (two) times a day Pt is taking preservision      nystatin (MYCOSTATIN) powder Apply topically 3 (three) times a day 30 g 3    omeprazole (PriLOSEC) 40 MG capsule Take 1 capsule (40 mg total) by mouth daily 90 capsule 3    OneTouch Verio test strip TEST ONCE A  each 0    sodium chloride, PF, 0 9 % Infuse 10 mL into a venous catheter daily Please flush each drain with 10cc each daily 60 Syringe 0    traMADol (ULTRAM) 50 mg tablet Take 1 tablet (50 mg total) by mouth every 6 (six) hours as needed for moderate pain 20 tablet 0    vitamin E, tocopherol, 400 units capsule Take 400 Units by mouth daily       No current facility-administered medications for this visit       Current Outpatient Medications on File Prior to Visit   Medication Sig    acetaminophen (TYLENOL) 500 mg tablet Take 1,000 mg by mouth every 6 (six) hours as needed for mild pain or moderate pain     aspirin 81 mg chewable tablet Chew 81 mg daily    atorvastatin (LIPITOR) 40 mg tablet Take 1 tablet (40 mg total) by mouth daily at bedtime    Blood Glucose Monitoring Suppl (OneTouch Verio Flex System) w/Device KIT USE TO TEST BLOOD GLUCOSE DAILY    Cholecalciferol (VITAMIN D) 50 MCG (2000 UT) tablet Take 2,000 Units by mouth daily    citalopram (CeleXA) 20 mg tablet Take 1 tablet (20 mg total) by mouth daily    fluticasone (FLONASE) 50 mcg/act nasal spray 2 sprays into each nostril daily    Lancets (OneTouch Delica Plus [id number]) MISC TEST BLOOD GLUCOSE ONCE DAILY    magnesium oxide (MAG-OX) 400 mg Take 1 tablet (400 mg total) by mouth 2 (two) times a day    metFORMIN (GLUCOPHAGE-XR) 500 mg 24 hr tablet TAKE TWO TABLETS BY MOUTH TWICE A DAY WITH MEALS    metoprolol tartrate (LOPRESSOR) 50 mg tablet TAKE ONE TABLET BY MOUTH TWICE A DAY    Multiple Vitamins-Minerals (OCUVITE-LUTEIN PO) Take 1 tablet by mouth 2 (two) times a day Pt is taking preservision    nystatin (MYCOSTATIN) powder Apply topically 3 (three) times a day    omeprazole (PriLOSEC) 40 MG capsule Take 1 capsule (40 mg total) by mouth daily    OneTouch Verio test strip TEST ONCE A DAY    sodium chloride, PF, 0 9 % Infuse 10 mL into a venous catheter daily Please flush each drain with 10cc each daily    traMADol (ULTRAM) 50 mg tablet Take 1 tablet (50 mg total) by mouth every 6 (six) hours as needed for moderate pain    vitamin E, tocopherol, 400 units capsule Take 400 Units by mouth daily    [DISCONTINUED] citalopram (CeleXA) 10 mg tablet Take 1 tablet (10 mg total) by mouth daily (Patient not taking: Reported on 5/7/2021)    [DISCONTINUED] Multiple Vitamins-Minerals (CENTRUM SILVER 50+MEN PO) Take 1 tablet by mouth daily      [DISCONTINUED] Omega-3 Fatty Acids (fish oil) 1,000 mg Take 1,000 mg by mouth daily     [DISCONTINUED] Sodium Chloride Flush (Normal Saline Flush) 0 9 % SOLN      No current facility-administered medications on file prior to visit  He is allergic to fentanyl and morphine and related       Review of Systems   Constitutional: Positive for activity change (  Slowly increasing his activity level)  Negative for appetite change, chills, diaphoresis, fatigue, fever and unexpected weight change  HENT: Negative  Eyes: Negative  Respiratory: Negative  Cardiovascular: Negative  Gastrointestinal: Negative for abdominal distention, abdominal pain, anal bleeding, blood in stool, constipation, diarrhea, nausea, rectal pain and vomiting  Some abdominal bloating, increased gas, occasional loose stools but not diarrhea   Endocrine: Negative  Musculoskeletal: Positive for arthralgias  Negative for back pain, gait problem, joint swelling ( some minor arthriticAches and pains but nothing new), myalgias, neck pain and neck stiffness  Skin: Negative  Allergic/Immunologic: Negative  Neurological: Negative  Hematological: Negative  Psychiatric/Behavioral: Negative for agitation, behavioral problems, confusion, decreased concentration, dysphoric mood, hallucinations, self-injury, sleep disturbance and suicidal ideas  The patient is not nervous/anxious and is not hyperactive           Emotionally states he is doing much better         Objective:      /72   Pulse 57   Temp 97 6 °F (36 4 °C)   Ht 5' 8" (1 727 m)   Wt 76 7 kg (169 lb)   SpO2 97%   BMI 25 70 kg/m²          Physical Exam  Vitals and nursing note reviewed  Constitutional:       General: He is not in acute distress  Appearance: Normal appearance  He is not ill-appearing, toxic-appearing or diaphoretic  Comments: Pleasant articulate 26-year-old male who is awake alert in no acute distress and oriented x3  HENT:      Head: Normocephalic and atraumatic  Right Ear: Tympanic membrane, ear canal and external ear normal  There is no impacted cerumen  Left Ear: Tympanic membrane, ear canal and external ear normal  There is no impacted cerumen  Nose: Nose normal  No congestion or rhinorrhea  Mouth/Throat:      Mouth: Mucous membranes are moist       Pharynx: Oropharynx is clear  No oropharyngeal exudate or posterior oropharyngeal erythema  Eyes:      General: No scleral icterus  Right eye: No discharge  Left eye: No discharge  Extraocular Movements: Extraocular movements intact  Conjunctiva/sclera: Conjunctivae normal       Pupils: Pupils are equal, round, and reactive to light  Neck:      Vascular: No carotid bruit  Cardiovascular:      Rate and Rhythm: Normal rate and regular rhythm  Pulses: Normal pulses  Heart sounds: Normal heart sounds  No murmur heard  No friction rub  No gallop  Pulmonary:      Effort: Pulmonary effort is normal  No respiratory distress  Breath sounds: Normal breath sounds  No stridor  No wheezing, rhonchi or rales  Chest:      Chest wall: No tenderness  Abdominal:      General: Bowel sounds are normal  There is no distension  Palpations: Abdomen is soft  There is no mass  Tenderness: There is no abdominal tenderness  There is no right CVA tenderness, left CVA tenderness, guarding or rebound  Hernia: No hernia is present  Musculoskeletal:         General: Deformity ( someGeneralized arthritic changes but nothing acute) present   No swelling, tenderness or signs of injury  Cervical back: Normal range of motion and neck supple  No rigidity or tenderness  Right lower leg: No edema  Left lower leg: No edema  Lymphadenopathy:      Cervical: No cervical adenopathy  Skin:     General: Skin is warm and dry  Capillary Refill: Capillary refill takes less than 2 seconds  Coloration: Skin is not jaundiced or pale  Findings: No bruising, erythema, lesion or rash  Neurological:      Mental Status: He is alert and oriented to person, place, and time  Mental status is at baseline  Cranial Nerves: No cranial nerve deficit  Sensory: Sensory deficit present  Motor: No weakness  Coordination: Coordination abnormal       Gait: Gait normal       Deep Tendon Reflexes: Reflexes abnormal    Psychiatric:         Mood and Affect: Mood normal          Behavior: Behavior normal          Thought Content:  Thought content normal          Judgment: Judgment normal

## 2021-07-13 NOTE — ASSESSMENT & PLAN NOTE
History of coronary artery disease in the distant past   He denies any chest pain or pressure no increasing shortness of breath exertion  He will continue follow-up with cardiology

## 2021-07-16 ENCOUNTER — APPOINTMENT (OUTPATIENT)
Dept: LAB | Facility: CLINIC | Age: 81
End: 2021-07-16
Payer: MEDICARE

## 2021-07-16 DIAGNOSIS — N18.32 STAGE 3B CHRONIC KIDNEY DISEASE (HCC): ICD-10-CM

## 2021-07-16 DIAGNOSIS — E83.42 HYPOMAGNESEMIA: ICD-10-CM

## 2021-07-16 LAB
ANION GAP SERPL CALCULATED.3IONS-SCNC: 10 MMOL/L (ref 4–13)
BUN SERPL-MCNC: 50 MG/DL (ref 5–25)
CALCIUM SERPL-MCNC: 9 MG/DL (ref 8.3–10.1)
CHLORIDE SERPL-SCNC: 113 MMOL/L (ref 100–108)
CO2 SERPL-SCNC: 17 MMOL/L (ref 21–32)
CREAT SERPL-MCNC: 2.23 MG/DL (ref 0.6–1.3)
GFR SERPL CREATININE-BSD FRML MDRD: 27 ML/MIN/1.73SQ M
GLUCOSE P FAST SERPL-MCNC: 119 MG/DL (ref 65–99)
MAGNESIUM SERPL-MCNC: 1.2 MG/DL (ref 1.6–2.6)
POTASSIUM SERPL-SCNC: 4.4 MMOL/L (ref 3.5–5.3)
SODIUM SERPL-SCNC: 140 MMOL/L (ref 136–145)

## 2021-07-16 PROCEDURE — 83735 ASSAY OF MAGNESIUM: CPT

## 2021-07-16 PROCEDURE — 80048 BASIC METABOLIC PNL TOTAL CA: CPT

## 2021-07-16 PROCEDURE — 36415 COLL VENOUS BLD VENIPUNCTURE: CPT

## 2021-07-18 DIAGNOSIS — F32.A ANXIETY AND DEPRESSION: ICD-10-CM

## 2021-07-18 DIAGNOSIS — F41.9 ANXIETY AND DEPRESSION: ICD-10-CM

## 2021-07-19 ENCOUNTER — OFFICE VISIT (OUTPATIENT)
Dept: NEPHROLOGY | Facility: CLINIC | Age: 81
End: 2021-07-19
Payer: MEDICARE

## 2021-07-19 VITALS
RESPIRATION RATE: 16 BRPM | HEART RATE: 60 BPM | SYSTOLIC BLOOD PRESSURE: 128 MMHG | HEIGHT: 68 IN | BODY MASS INDEX: 25.76 KG/M2 | DIASTOLIC BLOOD PRESSURE: 68 MMHG | WEIGHT: 170 LBS

## 2021-07-19 DIAGNOSIS — D64.9 ANEMIA, UNSPECIFIED TYPE: ICD-10-CM

## 2021-07-19 DIAGNOSIS — R80.1 PERSISTENT PROTEINURIA: ICD-10-CM

## 2021-07-19 DIAGNOSIS — E83.42 HYPOMAGNESEMIA: ICD-10-CM

## 2021-07-19 DIAGNOSIS — N18.4 STAGE 4 CHRONIC KIDNEY DISEASE (HCC): Primary | ICD-10-CM

## 2021-07-19 DIAGNOSIS — E87.2 METABOLIC ACIDOSIS: ICD-10-CM

## 2021-07-19 DIAGNOSIS — I10 BENIGN ESSENTIAL HYPERTENSION: ICD-10-CM

## 2021-07-19 PROBLEM — N18.30 STAGE 3 CHRONIC KIDNEY DISEASE (HCC): Status: RESOLVED | Noted: 2020-09-09 | Resolved: 2021-07-19

## 2021-07-19 PROCEDURE — 99214 OFFICE O/P EST MOD 30 MIN: CPT | Performed by: INTERNAL MEDICINE

## 2021-07-19 RX ORDER — CITALOPRAM 20 MG/1
TABLET ORAL
Qty: 30 TABLET | Refills: 3 | Status: SHIPPED | OUTPATIENT
Start: 2021-07-19 | End: 2021-12-13

## 2021-07-19 RX ORDER — SODIUM BICARBONATE 650 MG/1
1300 TABLET ORAL 2 TIMES DAILY
Qty: 360 TABLET | Refills: 3 | Status: SHIPPED | OUTPATIENT
Start: 2021-07-19

## 2021-07-19 NOTE — PATIENT INSTRUCTIONS
Renal function is stable  You have chronic kidney disease stage 4   -Avoid NSAIDs like Ibuprofen/Motrin, Naproxen/Aleve, Celebrex, meloxicam/Mobic, Diclofenac and other NSAIDs   -Okay to take Acetaminophen/Tylenol if you do not have any liver problems  -Avoid IV contrast used for CT scan and cardiac catheterization     -If plan for any study with IV contrast, please let me know so we could hydrate with fluids before and after IV contrast  -Dosage  of certain medications may need to be adjusted depending on Kidney function  Also discussed about risk of omeprazole causing worsening renal function    Recommend stopping metformin, need to discuss with PCP regarding other oral hypoglycemic agent      Recommend goal A1c less than 7 0  Blood pressure is stable, continue current medication  Refer to Kidney education class Check blood work in 2 weeks, 2 months and follow-up in 3 months with repeat labs

## 2021-07-19 NOTE — LETTER
July 19, 2021     Evy Fox MD  [address]  [place] [address]    Patient: Minor Barnett   YOB: 1940   Date of Visit: 7/19/2021       Dear Dr Kyle Ruiz:    Thank you for referring Megan Lara to me for evaluation  Below are my notes for this consultation  If you have questions, please do not hesitate to call me  I look forward to following your patient along with you  Sincerely,        Hemal oMntilla MD        CC: Shravan Joya MD  7/19/2021  3:21 PM  Sign when Signing Visit  NEPHROLOGY OUTPATIENT PROGRESS NOTE   Minor Barnett [de-identified] y o  male MRN: [id number]  DATE: 7/19/2021  Reason for visit:   Chief Complaint   Patient presents with    Follow-up    Chronic Kidney Disease       ASSESSMENT and PLAN:  Chronic kidney disease stage 4  -New baseline creatinine likely 1 9-2 2  -had acute kidney injury in February 2021 when he was admitted, was suspected to be due to prerenal azotemia, possible ATN  Renal function improved during the hospital stay to creatinine 1 9  Previous baseline creatinine was around 1 2-1 6 but since February after episode of acute kidney injury has been around 1 9-2 2  EGFR below 30, so calling it as chronic kidney disease stage 4  -patient had loopogram in February 2021 which was found to be normal  -chronic kidney disease likely due to diabetes mellitus type 2 causing diabetic nephropathy, hypertensive nephrosclerosis, age-related nephron loss, episode of acute kidney injury/ATN  -PTH was 27 8  -refer to Kidney education class  -avoid nephrotoxins  -avoid hypotension  -check BMP in 2 weeks, 2 months and follow-up in 3 months with repeat labs  Okay to follow-up with advanced practitioner done      Persistent proteinuria  -last upc ratio 0 67  -likely due to hypertensive nephrosclerosis and diabetic nephropathy  -previously was on irbesartan which was stopped during episode of acute kidney injury in February 2021  -blood pressure is currently well controlled, would not start on ACE inhibitor/ARB but if proteinuria worsening in future and if renal function is stable, may consider low-dose of ARB  Discussed with patient and he verbalized understanding  Metabolic acidosis  - bicarbonate level 17  - started on 1300 mg bid of sodium bicarbonate    -monitor bicarb level on blood work in 2 weeks, 2 months and in 3 months  Would adjust the dose of sodium bicarbonate tablets according  Use of sodium bicarbonate level has shown to decrease the rate of renal function decline in CKD population    Hypomagnesemia  -blood work from 07/16 showed magnesium level improving to 1 2 from previous level of 1 0 on July 7   -was started on magnesium oxide 400 mg twice daily after blood work from July 7   -continue magnesium oxide 400 mg twice daily, checking magnesium level in 2 weeks    Primary Hypertension with chronic kidney disease stage 4:   -Current medication:  Metoprolol     -Current blood pressure:  Stable and is at goal  -Plan:    ·  Continue metoprolol at current dose, recommend home monitoring of pulse rate and to call Cardiology if persistently less than 55  · Patient previously on irbesartan prior to acute kidney injury in February 2021 but currently blood pressure well controlled, no need to add another medication  -Recommend 2 g sodium diet  -Recommend daily exercise and weight loss  -Discussed home monitoring of BP and maintaining a log of blood pressure   -Recommend goal BP less than 130/80  Anemia: improving to 11 8  Continue to monitor, also checking iron panel before the next office visit    Bladder cancer   -reviewed urology note- status post BCG and status post Keytruda    Status post radical cysto prostatectomy with 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   -status post drainage of anterior wall abscess  -noted Urology plan to follow-up in 6 months with repeat CT abdomen       History of right pelvic abscess: status post IR guided drainage and drain placement-removed on 03/05/2021  -antibiotics course completed  Patient Instructions   Renal function is stable  You have chronic kidney disease stage 4   -Avoid NSAIDs like Ibuprofen/Motrin, Naproxen/Aleve, Celebrex, meloxicam/Mobic, Diclofenac and other NSAIDs   -Okay to take Acetaminophen/Tylenol if you do not have any liver problems  -Avoid IV contrast used for CT scan and cardiac catheterization     -If plan for any study with IV contrast, please let me know so we could hydrate with fluids before and after IV contrast  -Dosage  of certain medications may need to be adjusted depending on Kidney function  Also discussed about risk of omeprazole causing worsening renal function    Recommend stopping metformin, need to discuss with PCP regarding other oral hypoglycemic agent  Recommend goal A1c less than 7 0  Blood pressure is stable, continue current medication  Refer to Kidney education class Check blood work in 2 weeks, 2 months and follow-up in 3 months with repeat labs      Diagnoses and all orders for this visit:    Stage 4 chronic kidney disease ([facility] [address] )  -     Basic metabolic panel; Future  -     Magnesium; Future  -     Basic metabolic panel; Future  -     Magnesium; Future  -     Basic metabolic panel; Future  -     CBC; Future  -     Magnesium; Future  -     Phosphorus; Future  -     Protein / creatinine ratio, urine; Future  -     Ambulatory referral to ckd education program; Future    Hypomagnesemia  -     Magnesium; Future  -     Magnesium; Future  -     Magnesium; Future    Metabolic acidosis  -     Basic metabolic panel; Future  -     sodium bicarbonate 650 mg tablet; Take 2 tablets (1,300 mg total) by mouth 2 (two) times a day  -     Basic metabolic panel; Future    Persistent proteinuria  -     Protein / creatinine ratio, urine; Future    Benign essential hypertension    Anemia, unspecified type  -     CBC;  Future  -     Iron Panel (Includes Ferritin, Iron Sat%, Iron, and TIBC); Future            SUBJECTIVE / HPI:  Minor Barnett is a [de-identified] y o   male with past history of CAD status post CABG, bladder cancer status post ileal conduit in October 2020, diabetes mellitus type 2, hypertension presenting for follow-up of chronic kidney disease  Patient was 1st seen by Nephrology during hospital admission and had acute kidney injury at that time  Was thought to be prerenal, admission creatinine was 3 5 and improved to 1 9 in February 2021  During last office visit with advanced practitioner creatinine was around 2 0  Previous baseline creatinine was around 1 2-1 6  -during hospital admission, patient had pelvic abscess, underwent IR drainage and drain placement which was removed in March 5, 2021  Was previously seen by advanced practitioner  Reviewed blood work from [facility], renal function recently since February 2021 to July 2021 was reviewed, creatinine has been around 1 9-2 2  Was elevated to 2 4 on July 7 but then improved  Also was found to have low bicarb of 17 and magnesium level of 1 2  Was started on oral magnesium after the last blood work    C/o constipation and diarrhea on and off   Reviewed last PCP, Cardiology and Urology note    REVIEW OF SYSTEMS:    Review of Systems   Constitutional: Negative for chills and fever  HENT: Negative for ear pain and sore throat  Eyes: Negative for pain and visual disturbance  Respiratory: Negative for cough and shortness of breath  Cardiovascular: Negative for chest pain and palpitations  Gastrointestinal: Negative for abdominal pain and vomiting  Genitourinary: Negative for dysuria and hematuria  Has ileal conduit   Musculoskeletal: Negative for arthralgias and back pain  Skin: Negative for color change and rash  Neurological: Negative for seizures and syncope  All other systems reviewed and are negative        More than 10 point review of systems were obtained and discussed in length with the patient  Complete review of systems were negative / unremarkable except mentioned above         PAST MEDICAL HISTORY:  Past Medical History:   Diagnosis Date    Acute kidney injury ([facility] [address] )     Arthritis     Hands    Wright esophagus     BPH with obstruction/lower urinary tract symptoms     CAD (coronary artery disease)     Last assessed 09/16/15    Cancer ([facility] [address] )     bladder    Cataract, acquired     Last assessed 10/10/17    Coronary artery disease     Diabetes mellitus ([facility] )     NIDDM    Diabetic neuropathy ([facility] )     Feet    Enlarged prostate with lower urinary tract symptoms (LUTS)     Last assessed 10/10/17    Erectile dysfunction     GERD (gastroesophageal reflux disease)     Last assessed 10/10/17    Hemoptysis     Last assessed 03/14/16    Hypercholesterolemia     Last assessed 10/10/17    Hypertension     Last assessed 10/10/17    Macular degeneration     Right eye is particularly affected    Myocardial infarction ([facility] ) 1998    OAB (overactive bladder)     Testicular hypofunction     Testicular hypogonadism     Last assessed 10/10/17    Tinnitus     Umbilical hernia     Last assessed 06/18/14    Urge incontinence of urine     Wears glasses        PAST SURGICAL HISTORY:  Past Surgical History:   Procedure Laterality Date    COLONOSCOPY      CORONARY ARTERY BYPASS GRAFT  07/16/2014    ABG x 4 LIMA to LAD,SVG to diagnoal 2 SVG to OM-1, SVG to PDA, resection of partial plerual mass    CT GUIDED PERC DRAINAGE CATHETER PLACEMENT  2/19/2021    CYSTOSCOPY  2013    ESOPHAGOGASTRODUODENOSCOPY      FL RETROGRADE PYELOGRAM  12/20/2018    FL RETROGRADE PYELOGRAM  12/5/2019    INGUINAL HERNIA REPAIR Bilateral 2015    IR DRAINAGE TUBE CHECK AND/OR REMOVAL  3/5/2021    PHOTODYNAMIC THERAPY      For Barretts esophagus    MN COLONOSCOPY FLX DX W/COLLJ SPEC WHEN PFRMD N/A 4/25/2016    Procedure: COLONOSCOPY;  Surgeon: Shasta Boss MD;  Location: [facility];  Service: Gastroenterology    CT CYSTOURETHROSCOPY,BIOPSY N/A 9/10/2020    Procedure: CYSTOSCOPY, COLLECTION OF LEFT KIDNEY CYTOLOGY, BILATERAL RETROGRADE PYELOGRAM, BLADDER WALL BIOPSIES  AND Heath Horton, RANDOM BLADDER TUMOR BIOPSIES;  Surgeon: So Rosado MD;  Location: [address] [place] [address] OR;  Service: Urology    CT CYSTOURETHROSCOPY,FULGUR 2-5 CM LESN N/A 2020    Procedure: CYSTOSCOPY, TRANSURETHRAL RESECTION OF BLADDER TUMOR (TURBT);   Surgeon: So Rosado MD;  Location: [facility];  Service: Urology    CT CYSTOURETHROSCOPY,FULGUR <0 5 CM LESN N/A 2019    Procedure: CYSTO W/TURBT AND TRANSURETHRAL PROSTATE BIOPSY AND OPENING OF BLADDER NECK CONTRACTURE, B/L Retrograde pyelogram;  Surgeon: So Rosado MD;  Location: [facility] OR;  Service: Urology    TONSILLECTOMY      TRANSURETHRAL RESECTION OF PROSTATE N/A 2018    Procedure: CYSTO, PHOTO SELECTIVE VAPORIZATION OF PROSTATE, B/L RETROGRADE PYELOGRAM, TURBT;  Surgeon: So Rosado MD;  Location: [facility] OR;  Service: Urology    UMBILICAL HERNIA REPAIR  2012    UPPER GASTROINTESTINAL ENDOSCOPY         SOCIAL HISTORY:  Social History     Substance and Sexual Activity   Alcohol Use Yes    Alcohol/week: 2 0 standard drinks    Types: 1 Glasses of wine, 1 Cans of beer per week    Comment: 1 drink daily- a mixed drink or wine Last 2020     Social History     Substance and Sexual Activity   Drug Use No     Social History     Tobacco Use   Smoking Status Former Smoker    Packs/day:  00    Years: 13 00    Pack years: 13     Types: Cigarettes    Quit date: 0    Years since quittin 5   Smokeless Tobacco Never Used       FAMILY HISTORY:  Family History   Problem Relation Age of Onset    Cancer Mother     Other Mother         Digestive System Complications    Diabetes Father     Heart disease Father     Hypertension Father     Coronary artery disease Father     Hyperlipidemia Father        MEDICATIONS:    Current Outpatient Medications:     acetaminophen (TYLENOL) 500 mg tablet, Take 1,000 mg by mouth every 6 (six) hours as needed for mild pain or moderate pain , Disp: , Rfl:     aspirin 81 mg chewable tablet, Chew 81 mg daily, Disp: , Rfl:     atorvastatin (LIPITOR) 40 mg tablet, Take 1 tablet (40 mg total) by mouth daily at bedtime, Disp: 90 tablet, Rfl: 3    Blood Glucose Monitoring Suppl (OneTouch Verio Flex System) w/Device KIT, USE TO TEST BLOOD GLUCOSE DAILY, Disp: 1 kit, Rfl: 0    Cholecalciferol (VITAMIN D) 50 MCG (2000 UT) tablet, Take 2,000 Units by mouth daily, Disp: , Rfl:     citalopram (CeleXA) 20 mg tablet, TAKE ONE TABLET BY MOUTH EVERY DAY, Disp: 30 tablet, Rfl: 3    fluticasone (FLONASE) 50 mcg/act nasal spray, 2 sprays into each nostril daily, Disp: 16 g, Rfl: 3    Lancets (OneTouch Delica Plus [id number]) MISC, TEST BLOOD GLUCOSE ONCE DAILY, Disp: 100 each, Rfl: 0    magnesium oxide (MAG-OX) 400 mg, Take 1 tablet (400 mg total) by mouth 2 (two) times a day, Disp: 180 tablet, Rfl: 2    metFORMIN (GLUCOPHAGE-XR) 500 mg 24 hr tablet, TAKE TWO TABLETS BY MOUTH TWICE A DAY WITH MEALS (Patient taking differently: 500 mg 1 tablets 2 times daily), Disp: 360 tablet, Rfl: 0    metoprolol tartrate (LOPRESSOR) 50 mg tablet, TAKE ONE TABLET BY MOUTH TWICE A DAY, Disp: 180 tablet, Rfl: 4    nystatin (MYCOSTATIN) powder, Apply topically 3 (three) times a day, Disp: 30 g, Rfl: 3    omeprazole (PriLOSEC) 40 MG capsule, Take 1 capsule (40 mg total) by mouth daily, Disp: 90 capsule, Rfl: 3    OneTouch Verio test strip, TEST ONCE A DAY, Disp: 100 each, Rfl: 0    traMADol (ULTRAM) 50 mg tablet, Take 1 tablet (50 mg total) by mouth every 6 (six) hours as needed for moderate pain, Disp: 20 tablet, Rfl: 0    vitamin E, tocopherol, 400 units capsule, Take 400 Units by mouth daily, Disp: , Rfl:     Multiple Vitamins-Minerals (OCUVITE-LUTEIN PO), Take 1 tablet by mouth 2 (two) times a day Pt is taking preservision (Patient not taking: Reported on 7/19/2021), Disp: , Rfl:     sodium bicarbonate 650 mg tablet, Take 2 tablets (1,300 mg total) by mouth 2 (two) times a day, Disp: 360 tablet, Rfl: 3    sodium chloride, PF, 0 9 %, Infuse 10 mL into a venous catheter daily Please flush each drain with 10cc each daily, Disp: 60 Syringe, Rfl: 0      PHYSICAL EXAM:  Vitals:    07/19/21 1426 07/19/21 1458   BP: 128/68    BP Location: Left arm    Patient Position: Sitting    Cuff Size: Standard    Pulse: (!) 51 60   Resp: 16    Weight: 77 1 kg (170 lb)    Height: 5' 8" (1 727 m)      Body mass index is 25 85 kg/m²  Physical Exam  Constitutional:       General: He is not in acute distress  Appearance: He is well-developed  He is not diaphoretic  HENT:      Head: Normocephalic and atraumatic  Mouth/Throat:      Mouth: Mucous membranes are moist    Eyes:      General: No scleral icterus  Conjunctiva/sclera: Conjunctivae normal       Pupils: Pupils are equal, round, and reactive to light  Neck:      Thyroid: No thyromegaly  Cardiovascular:      Rate and Rhythm: Normal rate and regular rhythm  Heart sounds: Normal heart sounds  No murmur heard  No friction rub  Pulmonary:      Effort: Pulmonary effort is normal  No respiratory distress  Breath sounds: Normal breath sounds  No wheezing or rales  Abdominal:      General: Bowel sounds are normal  There is no distension  Palpations: Abdomen is soft  Tenderness: There is no abdominal tenderness  Comments: Ileal conduit+   Musculoskeletal:         General: No deformity  Cervical back: Neck supple  Right lower leg: No edema  Left lower leg: No edema  Lymphadenopathy:      Cervical: No cervical adenopathy  Skin:     Coloration: Skin is not pale  Nails: There is no clubbing  Neurological:      Mental Status: He is alert and oriented to person, place, and time  He is not disoriented     Psychiatric:         Mood and Affect: Mood normal  Mood is not anxious  Affect is not inappropriate  Behavior: Behavior normal          Thought Content:  Thought content normal          Lab Results:   Results for orders placed or performed in visit on 10/65/85   Basic metabolic panel   Result Value Ref Range    Sodium 140 136 - 145 mmol/L    Potassium 4 4 3 5 - 5 3 mmol/L    Chloride 113 (H) 100 - 108 mmol/L    CO2 17 (L) 21 - 32 mmol/L    ANION GAP 10 4 - 13 mmol/L    BUN 50 (H) 5 - 25 mg/dL    Creatinine 2 23 (H) 0 60 - 1 30 mg/dL    Glucose, Fasting 119 (H) 65 - 99 mg/dL    Calcium 9 0 8 3 - 10 1 mg/dL    eGFR 27 ml/min/1 73sq m   Magnesium   Result Value Ref Range    Magnesium 1 2 (L) 1 6 - 2 6 mg/dL     Lab Results:   Results from last 7 days   Lab Units 07/16/21  0908   POTASSIUM mmol/L 4 4   CHLORIDE mmol/L 113*   CO2 mmol/L 17*   BUN mg/dL 50*   CREATININE mg/dL 2 23*   CALCIUM mg/dL 9 0   MAGNESIUM mg/dL 1 2*       Laboratory Results:  Lab Results   Component Value Date    WBC 8 97 07/07/2021    HGB 11 8 (L) 07/07/2021    HCT 36 6 07/07/2021    MCV 95 07/07/2021     07/07/2021     Lab Results   Component Value Date    SODIUM 140 07/16/2021    K 4 4 07/16/2021     (H) 07/16/2021    CO2 17 (L) 07/16/2021    BUN 50 (H) 07/16/2021    CREATININE 2 23 (H) 07/16/2021    GLUC 111 02/25/2021    CALCIUM 9 0 07/16/2021     Lab Results   Component Value Date    CALCIUM 9 0 07/16/2021    PHOS 3 5 07/07/2021     No results found for: LABPROT  [unfilled]  Lab Results   Component Value Date    PTH 27 8 07/07/2021    CALCIUM 9 0 07/16/2021    PHOS 3 5 07/07/2021     [unfilled]

## 2021-08-02 ENCOUNTER — LAB (OUTPATIENT)
Dept: LAB | Facility: CLINIC | Age: 81
End: 2021-08-02
Payer: MEDICARE

## 2021-08-02 DIAGNOSIS — I10 BENIGN ESSENTIAL HYPERTENSION: ICD-10-CM

## 2021-08-02 DIAGNOSIS — E83.42 HYPOMAGNESEMIA: ICD-10-CM

## 2021-08-02 DIAGNOSIS — E11.9 TYPE 2 DIABETES MELLITUS WITHOUT COMPLICATION, WITHOUT LONG-TERM CURRENT USE OF INSULIN (HCC): ICD-10-CM

## 2021-08-02 DIAGNOSIS — N18.4 STAGE 4 CHRONIC KIDNEY DISEASE (HCC): ICD-10-CM

## 2021-08-02 DIAGNOSIS — Z00.00 MEDICARE ANNUAL WELLNESS VISIT, SUBSEQUENT: ICD-10-CM

## 2021-08-02 LAB
ALBUMIN SERPL BCP-MCNC: 3.3 G/DL (ref 3.5–5)
ALP SERPL-CCNC: 80 U/L (ref 46–116)
ALT SERPL W P-5'-P-CCNC: 29 U/L (ref 12–78)
ANION GAP SERPL CALCULATED.3IONS-SCNC: 4 MMOL/L (ref 4–13)
AST SERPL W P-5'-P-CCNC: 19 U/L (ref 5–45)
BACTERIA UR QL AUTO: ABNORMAL /HPF
BASOPHILS # BLD AUTO: 0.04 THOUSANDS/ΜL (ref 0–0.1)
BASOPHILS NFR BLD AUTO: 0 % (ref 0–1)
BILIRUB SERPL-MCNC: 0.5 MG/DL (ref 0.2–1)
BILIRUB UR QL STRIP: NEGATIVE
BUN SERPL-MCNC: 44 MG/DL (ref 5–25)
CALCIUM ALBUM COR SERPL-MCNC: 9.7 MG/DL (ref 8.3–10.1)
CALCIUM SERPL-MCNC: 9.1 MG/DL (ref 8.3–10.1)
CHLORIDE SERPL-SCNC: 104 MMOL/L (ref 100–108)
CHOLEST SERPL-MCNC: 140 MG/DL (ref 50–200)
CLARITY UR: ABNORMAL
CO2 SERPL-SCNC: 30 MMOL/L (ref 21–32)
COLOR UR: YELLOW
CREAT SERPL-MCNC: 2 MG/DL (ref 0.6–1.3)
CREAT UR-MCNC: 44.3 MG/DL
EOSINOPHIL # BLD AUTO: 0.37 THOUSAND/ΜL (ref 0–0.61)
EOSINOPHIL NFR BLD AUTO: 4 % (ref 0–6)
ERYTHROCYTE [DISTWIDTH] IN BLOOD BY AUTOMATED COUNT: 14.5 % (ref 11.6–15.1)
EST. AVERAGE GLUCOSE BLD GHB EST-MCNC: 120 MG/DL
GFR SERPL CREATININE-BSD FRML MDRD: 30 ML/MIN/1.73SQ M
GLUCOSE P FAST SERPL-MCNC: 145 MG/DL (ref 65–99)
GLUCOSE UR STRIP-MCNC: NEGATIVE MG/DL
HBA1C MFR BLD: 5.8 %
HCT VFR BLD AUTO: 36.5 % (ref 36.5–49.3)
HDLC SERPL-MCNC: 66 MG/DL
HGB BLD-MCNC: 11.4 G/DL (ref 12–17)
HGB UR QL STRIP.AUTO: ABNORMAL
IMM GRANULOCYTES # BLD AUTO: 0.05 THOUSAND/UL (ref 0–0.2)
IMM GRANULOCYTES NFR BLD AUTO: 1 % (ref 0–2)
KETONES UR STRIP-MCNC: NEGATIVE MG/DL
LDLC SERPL CALC-MCNC: 63 MG/DL (ref 0–100)
LEUKOCYTE ESTERASE UR QL STRIP: ABNORMAL
LYMPHOCYTES # BLD AUTO: 1.71 THOUSANDS/ΜL (ref 0.6–4.47)
LYMPHOCYTES NFR BLD AUTO: 18 % (ref 14–44)
MAGNESIUM SERPL-MCNC: 2.2 MG/DL (ref 1.6–2.6)
MCH RBC QN AUTO: 30.6 PG (ref 26.8–34.3)
MCHC RBC AUTO-ENTMCNC: 31.2 G/DL (ref 31.4–37.4)
MCV RBC AUTO: 98 FL (ref 82–98)
MICROALBUMIN UR-MCNC: 52.3 MG/L (ref 0–20)
MICROALBUMIN/CREAT 24H UR: 118 MG/G CREATININE (ref 0–30)
MONOCYTES # BLD AUTO: 0.94 THOUSAND/ΜL (ref 0.17–1.22)
MONOCYTES NFR BLD AUTO: 10 % (ref 4–12)
NEUTROPHILS # BLD AUTO: 6.27 THOUSANDS/ΜL (ref 1.85–7.62)
NEUTS SEG NFR BLD AUTO: 67 % (ref 43–75)
NITRITE UR QL STRIP: POSITIVE
NON-SQ EPI CELLS URNS QL MICRO: ABNORMAL /HPF
NONHDLC SERPL-MCNC: 74 MG/DL
NRBC BLD AUTO-RTO: 0 /100 WBCS
PH UR STRIP.AUTO: 8 [PH]
PLATELET # BLD AUTO: 148 THOUSANDS/UL (ref 149–390)
PMV BLD AUTO: 12.6 FL (ref 8.9–12.7)
POTASSIUM SERPL-SCNC: 4.5 MMOL/L (ref 3.5–5.3)
PROT SERPL-MCNC: 6.9 G/DL (ref 6.4–8.2)
PROT UR STRIP-MCNC: ABNORMAL MG/DL
RBC # BLD AUTO: 3.73 MILLION/UL (ref 3.88–5.62)
RBC #/AREA URNS AUTO: ABNORMAL /HPF
SODIUM SERPL-SCNC: 138 MMOL/L (ref 136–145)
SP GR UR STRIP.AUTO: 1.01 (ref 1–1.03)
TRIGL SERPL-MCNC: 54 MG/DL
UROBILINOGEN UR QL STRIP.AUTO: 0.2 E.U./DL
WBC # BLD AUTO: 9.38 THOUSAND/UL (ref 4.31–10.16)
WBC #/AREA URNS AUTO: ABNORMAL /HPF

## 2021-08-02 PROCEDURE — 82043 UR ALBUMIN QUANTITATIVE: CPT | Performed by: INTERNAL MEDICINE

## 2021-08-02 PROCEDURE — 82570 ASSAY OF URINE CREATININE: CPT | Performed by: INTERNAL MEDICINE

## 2021-08-02 PROCEDURE — 85025 COMPLETE CBC W/AUTO DIFF WBC: CPT

## 2021-08-02 PROCEDURE — 80053 COMPREHEN METABOLIC PANEL: CPT

## 2021-08-02 PROCEDURE — 80061 LIPID PANEL: CPT

## 2021-08-02 PROCEDURE — 83036 HEMOGLOBIN GLYCOSYLATED A1C: CPT

## 2021-08-02 PROCEDURE — 83735 ASSAY OF MAGNESIUM: CPT

## 2021-08-02 PROCEDURE — 81001 URINALYSIS AUTO W/SCOPE: CPT

## 2021-08-02 PROCEDURE — 36415 COLL VENOUS BLD VENIPUNCTURE: CPT

## 2021-08-02 NOTE — RESULT ENCOUNTER NOTE
Please inform patient that renal function is stable at creatinine 2 0 and magnesium level is now at normal range, continue current dose of magnesium oxide     Please ask patient if he has any UTI symptoms

## 2021-08-03 ENCOUNTER — TELEPHONE (OUTPATIENT)
Dept: NEPHROLOGY | Facility: CLINIC | Age: 81
End: 2021-08-03

## 2021-08-03 NOTE — TELEPHONE ENCOUNTER
----- Message from So Strickland MD sent at 8/2/2021  6:10 PM EDT -----  Please inform patient that renal function is stable at creatinine 2 0 and magnesium level is now at normal range, continue current dose of magnesium oxide     Please ask patient if he has any UTI symptoms

## 2021-08-06 ENCOUNTER — OFFICE VISIT (OUTPATIENT)
Dept: ENDOCRINOLOGY | Facility: CLINIC | Age: 81
End: 2021-08-06
Payer: MEDICARE

## 2021-08-06 VITALS
WEIGHT: 172.13 LBS | BODY MASS INDEX: 26.09 KG/M2 | HEIGHT: 68 IN | DIASTOLIC BLOOD PRESSURE: 60 MMHG | SYSTOLIC BLOOD PRESSURE: 132 MMHG | HEART RATE: 56 BPM

## 2021-08-06 DIAGNOSIS — E11.3293 TYPE 2 DIABETES MELLITUS WITH BOTH EYES AFFECTED BY MILD NONPROLIFERATIVE RETINOPATHY WITHOUT MACULAR EDEMA, WITHOUT LONG-TERM CURRENT USE OF INSULIN (HCC): Primary | ICD-10-CM

## 2021-08-06 PROCEDURE — 99204 OFFICE O/P NEW MOD 45 MIN: CPT | Performed by: INTERNAL MEDICINE

## 2021-08-06 RX ORDER — DAPAGLIFLOZIN 5 MG/1
5 TABLET, FILM COATED ORAL DAILY
Qty: 30 TABLET | Refills: 2 | Status: SHIPPED | OUTPATIENT
Start: 2021-08-06 | End: 2021-11-03

## 2021-08-06 NOTE — PROGRESS NOTES
Casimiro Veliz 80 y o  male MRN: [id number]    Encounter: [id number]      Assessment/Plan     Assessment: This is a 80y o -year-old male with diabetes without hyperglycemia and stage 4 chronic kidney disease  Plan:  1  Type 2 diabetes is well controlled  In the context of stage 4 chronic kidney disease, he would benefit from [place] 5 mg per day which I have started  He was referred to medical nutrition therapy due to confusion with renal and diabetes related diet  2  Stage 4 chronic kidney disease-he does follow with Nephrology  CC: Diabetes    History of Present Illness     HPI:    80-year-old male with type 2 diabetes for over eight years presents for follow-up at the request of his nephrologist   He was recently diagnosed with bladder cancer for which he underwent gallbladder resection  His diabetes is complicated by stage 4 chronic kidney disease and due to this, his metformin was discontinued  He is currently not on any medications and his blood sugars have been doing quite well  There has been no hypoglycemia, stroke, claudication, heart attack, neuropathy but he does admit to some macular degeneration and mild retinopathy  He denies any polyuria or polydipsia  Review of Systems   Constitutional: Negative for chills and fever  Respiratory: Negative for shortness of breath  Cardiovascular: Negative for chest pain  Gastrointestinal: Negative for constipation, diarrhea, nausea and vomiting  All other systems reviewed and are negative        Historical Information   Past Medical History:   Diagnosis Date    Acute kidney [facility] ([facility] [address] )     Arthritis     Hands    Wright esophagus     BPH with obstruction/lower urinary tract symptoms     CAD (coronary artery disease)     Last assessed 09/16/15    Cancer [facility])     bladder    Cataract, acquired     Last assessed 10/10/17    Coronary artery disease     Diabetes mellitus ([facility] [address] )     NIDDM    Diabetic neuropathy ([facility] [address] )     Feet  Enlarged prostate with lower urinary tract symptoms (LUTS)     Last assessed 10/10/17    Erectile dysfunction     GERD (gastroesophageal reflux disease)     Last assessed 10/10/17    Hemoptysis     Last assessed 03/14/16    Hypercholesterolemia     Last assessed 10/10/17    Hypertension     Last assessed 10/10/17    Macular degeneration     Right eye is particularly affected    Myocardial infarction ([facility] [address] ) 1998    OAB (overactive bladder)     Testicular hypofunction     Testicular hypogonadism     Last assessed 10/10/17    Tinnitus     Umbilical hernia     Last assessed 06/18/14    Urge incontinence of urine     Wears glasses      Past Surgical History:   Procedure Laterality Date    COLONOSCOPY      CORONARY ARTERY BYPASS GRAFT  07/16/2014    ABG x 4 LIMA to LAD,SVG to diagnoal 2 SVG to OM-1, SVG to PDA, resection of partial plerual mass    CT GUIDED PERC DRAINAGE CATHETER PLACEMENT  2/19/2021    CYSTOSCOPY  2013    ESOPHAGOGASTRODUODENOSCOPY      FL RETROGRADE PYELOGRAM  12/20/2018    FL RETROGRADE PYELOGRAM  12/5/2019    INGUINAL HERNIA REPAIR Bilateral 2015    IR DRAINAGE TUBE CHECK AND/OR REMOVAL  3/5/2021    PHOTODYNAMIC THERAPY      For Barretts esophagus    OR COLONOSCOPY FLX DX W/COLLJ SPEC WHEN PFRMD N/A 4/25/2016    Procedure: COLONOSCOPY;  Surgeon: Sheyla Mondragon MD;  Location:  GI LAB; Service: Gastroenterology    OR CYSTOURETHROSCOPY,BIOPSY N/A 9/10/2020    Procedure: CYSTOSCOPY, COLLECTION OF LEFT KIDNEY CYTOLOGY, BILATERAL RETROGRADE PYELOGRAM, BLADDER WALL BIOPSIES  AND [address], RANDOM BLADDER TUMOR BIOPSIES;  Surgeon: Ulises Gibsno MD;  Location: [address] [place] [address] MAIN OR;  Service: Urology    OR CYSTOURETHROSCOPY,FULGUR 2-5 CM LESN N/A 4/16/2020    Procedure: CYSTOSCOPY, TRANSURETHRAL RESECTION OF BLADDER TUMOR (TURBT);   Surgeon: Ulises Gibson MD;  [facility];  Service: Urology    OR CYSTOURETHROSCOPY,FULGUR <0 5 CM LESN N/A 12/5/2019    Procedure: Adán Shelton W/TURBT AND TRANSURETHRAL PROSTATE BIOPSY AND OPENING OF BLADDER NECK CONTRACTURE, B/L Retrograde pyelogram;  Surgeon: Tato Hammond MD;  [facility];  Service: Urology    TONSILLECTOMY      TRANSURETHRAL RESECTION OF PROSTATE N/A 2018    Procedure: CYSTO, PHOTO SELECTIVE VAPORIZATION OF PROSTATE, B/L RETROGRADE PYELOGRAM, TURBT;  Surgeon: Tato Hammond MD;  [facility];  Service: Urology    UMBILICAL HERNIA REPAIR      UPPER GASTROINTESTINAL ENDOSCOPY       Social History   Social History     Substance and Sexual Activity   Alcohol Use Not Currently    Alcohol/week: 2 0 standard drinks    Types: 1 Glasses of wine, 1 Cans of beer per week    Comment: 1 drink daily- a mixed drink or wine Last 2020     Social History     Substance and Sexual Activity   Drug Use No     Social History     Tobacco Use   Smoking Status Former Smoker    Packs/day:  00    Years:     Pack years:     Types: Cigarettes    Quit date: 0    Years since quittin 6   Smokeless Tobacco Never Used     Family History:   Family History   Problem Relation Age of Onset   Ritesh Choudhury Cancer Mother     Other Mother         Digestive System Complications    Diabetes Father     Heart disease Father     Hypertension Father     Coronary artery disease Father     Hyperlipidemia Father        Meds/Allergies   Current Outpatient Medications   Medication Sig Dispense Refill    acetaminophen (TYLENOL) 500 mg tablet Take 1,000 mg by mouth every 6 (six) hours as needed for mild pain or moderate pain       atorvastatin (LIPITOR) 40 mg tablet Take 1 tablet (40 mg total) by mouth daily at bedtime 90 tablet 3    Blood Glucose Monitoring Suppl (OneTouch Verio Flex System) w/Device KIT USE TO TEST BLOOD GLUCOSE DAILY 1 kit 0    Cholecalciferol (VITAMIN D) 50 MCG ( UT) tablet Take 2,000 Units by mouth daily      citalopram (CeleXA) 20 mg tablet TAKE ONE TABLET BY MOUTH EVERY DAY 30 tablet 3    fluticasone (FLONASE) 50 mcg/act nasal spray 2 sprays into each nostril daily 16 g 3    Lancets (OneTouch Delica Plus [id number]) MISC TEST BLOOD GLUCOSE ONCE DAILY 100 each 0    magnesium oxide (MAG-OX) 400 mg Take 1 tablet (400 mg total) by mouth 2 (two) times a day 180 tablet 2    metoprolol tartrate (LOPRESSOR) 50 mg tablet TAKE ONE TABLET BY MOUTH TWICE A  tablet 4    Multiple Vitamins-Minerals (OCUVITE-LUTEIN PO) Take 1 tablet by mouth 2 (two) times a day Pt is taking preservision      nystatin (MYCOSTATIN) powder Apply topically 3 (three) times a day 30 g 3    omeprazole (PriLOSEC) 40 MG capsule Take 1 capsule (40 mg total) by mouth daily 90 capsule 3    OneTouch Verio test strip TEST ONCE A  each 0    sodium bicarbonate 650 mg tablet Take 2 tablets (1,300 mg total) by mouth 2 (two) times a day 360 tablet 3    traMADol (ULTRAM) 50 mg tablet Take 1 tablet (50 mg total) by mouth every 6 (six) hours as needed for moderate pain 20 tablet 0    vitamin E, tocopherol, 400 units capsule Take 400 Units by mouth daily      aspirin 81 mg chewable tablet Chew 81 mg daily (Patient not taking: Reported on 8/6/2021)      Dapagliflozin Propanediol (Farxiga) 5 MG TABS Take 1 tablet (5 mg total) by mouth daily 30 tablet 2    metFORMIN (GLUCOPHAGE-XR) 500 mg 24 hr tablet TAKE TWO TABLETS BY MOUTH TWICE A DAY WITH MEALS (Patient not taking: Reported on 8/6/2021) 360 tablet 0    sodium chloride, PF, 0 9 % Infuse 10 mL into a venous catheter daily Please flush each drain with 10cc each daily 60 Syringe 0     No current facility-administered medications for this visit  Allergies   Allergen Reactions    Fentanyl Hallucinations    Morphine And Related Other (See Comments)     While having an MI patient received morphine and had adverse reaction but doesn't know what happened  Objective   Vitals: Blood pressure 132/60, pulse 56, height 5' 8" (1 727 m), weight 78 1 kg (172 lb 2 oz)      Physical Exam  Vitals reviewed  Constitutional:       General: He is not in acute distress  Appearance: He is well-developed  He is not diaphoretic  HENT:      Head: Normocephalic and atraumatic  Eyes:      General: Lids are normal  No scleral icterus  Right eye: No discharge  Left eye: No discharge  Conjunctiva/sclera: Conjunctivae normal    Neck:      Thyroid: No thyromegaly  Cardiovascular:      Rate and Rhythm: Normal rate and regular rhythm  Heart sounds: Normal heart sounds  No murmur heard  No friction rub  No gallop  Pulmonary:      Effort: Pulmonary effort is normal  No respiratory distress  Breath sounds: Normal breath sounds  No wheezing  Abdominal:      General: Bowel sounds are normal  There is no distension  Palpations: Abdomen is soft  Tenderness: There is no abdominal tenderness  Musculoskeletal:         General: No tenderness or deformity  Normal range of motion  Cervical back: Neck supple  Lymphadenopathy:      Head:      Right side of head: No occipital adenopathy  Left side of head: No occipital adenopathy  Upper Body:      Right upper body: No supraclavicular adenopathy  Left upper body: No supraclavicular adenopathy  Skin:     General: Skin is warm  Findings: No erythema or rash  Neurological:      Mental Status: He is alert and oriented to person, place, and time  Coordination: Coordination normal    Psychiatric:         Mood and Affect: Mood normal          Behavior: Behavior normal          The history was obtained from the review of the chart, patient      Lab Results:   Lab Results   Component Value Date/Time    Hemoglobin A1C 5 8 (H) 08/02/2021 08:25 AM    Hemoglobin A1C 6 0 (H) 07/07/2021 09:00 AM    Hemoglobin A1C 6 0 (H) 02/18/2021 12:32 AM    WBC 9 38 08/02/2021 08:25 AM    WBC 8 97 07/07/2021 09:00 AM    WBC 17 00 (H) 02/26/2021 05:06 AM    Hemoglobin 11 4 (L) 08/02/2021 08:25 AM    Hemoglobin 11 8 (L) 07/07/2021 09:00 AM    Hemoglobin 9 3 (L) 02/26/2021 05:06 AM    Hematocrit 36 5 08/02/2021 08:25 AM    Hematocrit 36 6 07/07/2021 09:00 AM    Hematocrit 30 3 (L) 02/26/2021 05:06 AM    MCV 98 08/02/2021 08:25 AM    MCV 95 07/07/2021 09:00 AM     (H) 02/26/2021 05:06 AM    Platelets 276 (L) 63/34/2597 08:25 AM    Platelets 079 16/63/7799 09:00 AM    Platelets 859 54/76/7443 05:06 AM    BUN 44 (H) 08/02/2021 08:25 AM    BUN 50 (H) 07/16/2021 09:08 AM    BUN 53 (H) 07/07/2021 09:00 AM    Potassium 4 5 08/02/2021 08:25 AM    Potassium 4 4 07/16/2021 09:08 AM    Potassium 4 0 07/07/2021 09:00 AM    Chloride 104 08/02/2021 08:25 AM    Chloride 113 (H) 07/16/2021 09:08 AM    Chloride 115 (H) 07/07/2021 09:00 AM    CO2 30 08/02/2021 08:25 AM    CO2 17 (L) 07/16/2021 09:08 AM    CO2 19 (L) 07/07/2021 09:00 AM    Creatinine 2 00 (H) 08/02/2021 08:25 AM    Creatinine 2 23 (H) 07/16/2021 09:08 AM    Creatinine 2 40 (H) 07/07/2021 09:00 AM    AST 19 08/02/2021 08:25 AM    AST 12 07/07/2021 09:00 AM    AST 18 02/18/2021 11:26 AM    ALT 29 08/02/2021 08:25 AM    ALT 23 07/07/2021 09:00 AM    ALT 19 02/18/2021 11:26 AM    Albumin 3 3 (L) 08/02/2021 08:25 AM    Albumin 3 6 07/07/2021 09:00 AM    Albumin 2 0 (L) 02/18/2021 11:26 AM    HDL, Direct 66 08/02/2021 08:25 AM    HDL, Direct 32 (L) 02/18/2021 04:49 AM    Triglycerides 54 08/02/2021 08:25 AM    Triglycerides 81 02/18/2021 04:49 AM           Imaging Studies: I have personally reviewed pertinent reports  Portions of the record may have been created with voice recognition software  Occasional wrong word or "sound a like" substitutions may have occurred due to the inherent limitations of voice recognition software  Read the chart carefully and recognize, using context, where substitutions have occurred

## 2021-08-19 ENCOUNTER — OFFICE VISIT (OUTPATIENT)
Dept: DIABETES SERVICES | Facility: CLINIC | Age: 81
End: 2021-08-19
Payer: MEDICARE

## 2021-08-19 VITALS — BODY MASS INDEX: 26.37 KG/M2 | WEIGHT: 174 LBS | HEIGHT: 68 IN

## 2021-08-19 DIAGNOSIS — E11.3293 TYPE 2 DIABETES MELLITUS WITH BOTH EYES AFFECTED BY MILD NONPROLIFERATIVE RETINOPATHY WITHOUT MACULAR EDEMA, WITHOUT LONG-TERM CURRENT USE OF INSULIN (HCC): ICD-10-CM

## 2021-08-19 PROCEDURE — 97802 MEDICAL NUTRITION INDIV IN: CPT | Performed by: DIETITIAN, REGISTERED

## 2021-08-19 NOTE — PROGRESS NOTES
Medical Nutrition Therapy        Assessment    Visit Type: Initial visit    Chief complaint Kinza Sanchez has had type 2 diabetes for many years and is in relatively good control  Recently he was diagnosed with CKD stage 4  Per his wife Nico Rob), "What happened to stages 2 and 3, why didn't we know?" They have done some reading on the Internet about the renal diet and have a lot of questions today  HPI: Dania diet history reveals 3 meals and moderate snacks in the afternoon and evening  They have changed from whole grains to white and sourdough bread because of what they have read  Diet does include vegetables, fruits, and mostly lean proteins but also the occasional hotdog and [place] hoagie  Currently meals range from 30 to 60 grams of carbohydrate  Explained basic pathophysiology of diabetes and impact of diet on blood glucose levels  Together we discussed what foods contain CHO, reading a food label, and serving sizes  Used the portion booklet to teach Kinza Sanchez more about food groups and basic carbohydrate counting  Created an individualized meal plan for Kinza Sanchez with 3 meals and 2 snacks providing 45-60 g carb per meal and 15-20 g carb per snack  This plan will help promote weight loss  Put together sample meals for Sidney's reference  Kinza Sanchez and Nadja Silva demonstrated good understanding, Kinza Sanchez will call with questions or for more education      Ht Readings from Last 1 Encounters:   08/06/21 5' 8" (1 727 m)     Wt Readings from Last 2 Encounters:   08/06/21 78 1 kg (172 lb 2 oz)   07/19/21 77 1 kg (170 lb)     Weight Change: Yes -30# in the past year, had been down to 155#, was up to 205 when well    Medical Diagnosis/ICD10: E11 3293 (ICD-10-CM) - Type 2 diabetes mellitus with both eyes affected by mild nonproliferative retinopathy without macular edema, without long-term current use of insulin ([facility] [address] )    Barriers to Learning: no barriers    Do you follow any special diet presently?: Yes - watches with wife's help  Who shops: spouse  Who cooks: spouse    Food Log: Completed via the method of food recall    Breakfast:8:30-10:30, was eating Raisin bran, now having Special K red berries or regular Special K, adds blueberries or peaches, [place] milk, coffee w/ 2 T   Half and half and truvia  Morning Snack:none  Lunch:12-2, 1/2 sandwich, using [employer] sourdough bread w/ 1 egg and cheese or tuna or hot dog or low sodium deli meat, fruit - watermelon or peaches OR 2 chicken tenders, breaded, cabbage slaw, red beets - pickled  Afternoon Snack: not much, may have a yogurt or [place] ice  XQLVBR:9-5 pm slice roast beef on a roll (half sandwich leftover), lean beef, watermelon feta salad OR meat, starch, vegetable, likes salad with lettuce, cukes, tomatoes, blue cheese dressing  Evening Snack:9-10 pm, italian ice or ice cream, regular, or sherbet > 1/2 cup  Beverages: Crystal light lemonade, water, fruit juice - apple 6-8 ounces, homemade iced tea with very little sugar  Eating out/Take out:some  Exercise a little walking, yard work    Calorie needs 1770 kcals/day Carbs: 45-60 g/meal, 15-20g/snack         Nutrition Diagnosis:  Food and nutrition related knowledge deficit  related to Lack of prior exposure to accurate nutrition related information as evidenced by New medical diagnosis or change in existing diagnosis or condition    Intervention: label reading, carbohydrate counting, increased plant based foods, individualized meal plan and monitoring portion control     Treatment Goals: Patient understands education and recommendations, Patient will monitor portion control and Patient will count carbohydrates    Monitoring and evaluation:    Term code indicator  FH 1 6 3 Carbohydrate Intake Criteria: Follow meal plan  Term code indicator  FH 1 6 2 Protein Intake Criteria: 6 ounces per day of protein foods    Patients Response to Instruction:  Comprehensiongood  Motivationvery good  Expected Compliancevery good    Thank you for coming to the [facility] for education today  Please feel free to call with any questions or concerns      Wanda Russ  [address] [place] [id number] [facility] [id number]

## 2021-08-19 NOTE — PATIENT INSTRUCTIONS
1  Follow meal plan  2  Choose lower sodium foods  3  Add more monounsaturated and polyunsaturated fats for more calories, unsalted nuts, peanut butter, and avocado as well as cooking with olive oil or canola oil  4   Keep 3 day food diary before next visit

## 2021-08-23 DIAGNOSIS — I10 ESSENTIAL HYPERTENSION, BENIGN: ICD-10-CM

## 2021-08-23 RX ORDER — METOPROLOL TARTRATE 50 MG/1
50 TABLET, FILM COATED ORAL 2 TIMES DAILY
Qty: 180 TABLET | Refills: 3 | Status: SHIPPED | OUTPATIENT
Start: 2021-08-23

## 2021-09-27 ENCOUNTER — TELEPHONE (OUTPATIENT)
Dept: NEPHROLOGY | Facility: CLINIC | Age: 81
End: 2021-09-27

## 2021-09-27 ENCOUNTER — LAB (OUTPATIENT)
Dept: LAB | Facility: CLINIC | Age: 81
End: 2021-09-27
Payer: MEDICARE

## 2021-09-27 DIAGNOSIS — R80.1 PERSISTENT PROTEINURIA: ICD-10-CM

## 2021-09-27 DIAGNOSIS — N18.4 STAGE 4 CHRONIC KIDNEY DISEASE (HCC): ICD-10-CM

## 2021-09-27 DIAGNOSIS — E83.42 HYPOMAGNESEMIA: ICD-10-CM

## 2021-09-27 DIAGNOSIS — E87.2 METABOLIC ACIDOSIS: ICD-10-CM

## 2021-09-27 DIAGNOSIS — N18.4 STAGE 4 CHRONIC KIDNEY DISEASE (HCC): Primary | ICD-10-CM

## 2021-09-27 DIAGNOSIS — D64.9 ANEMIA, UNSPECIFIED TYPE: ICD-10-CM

## 2021-09-27 LAB
ANION GAP SERPL CALCULATED.3IONS-SCNC: 3 MMOL/L (ref 4–13)
BUN SERPL-MCNC: 50 MG/DL (ref 5–25)
CALCIUM SERPL-MCNC: 8.8 MG/DL (ref 8.3–10.1)
CHLORIDE SERPL-SCNC: 109 MMOL/L (ref 100–108)
CO2 SERPL-SCNC: 27 MMOL/L (ref 21–32)
CREAT SERPL-MCNC: 2.36 MG/DL (ref 0.6–1.3)
CREAT UR-MCNC: 41.1 MG/DL
ERYTHROCYTE [DISTWIDTH] IN BLOOD BY AUTOMATED COUNT: 13.3 % (ref 11.6–15.1)
FERRITIN SERPL-MCNC: 88 NG/ML (ref 8–388)
GFR SERPL CREATININE-BSD FRML MDRD: 25 ML/MIN/1.73SQ M
GLUCOSE P FAST SERPL-MCNC: 143 MG/DL (ref 65–99)
HCT VFR BLD AUTO: 36 % (ref 36.5–49.3)
HGB BLD-MCNC: 11.8 G/DL (ref 12–17)
IRON SATN MFR SERPL: 16 % (ref 20–50)
IRON SERPL-MCNC: 45 UG/DL (ref 65–175)
MAGNESIUM SERPL-MCNC: 2.4 MG/DL (ref 1.6–2.6)
MCH RBC QN AUTO: 31.7 PG (ref 26.8–34.3)
MCHC RBC AUTO-ENTMCNC: 32.8 G/DL (ref 31.4–37.4)
MCV RBC AUTO: 97 FL (ref 82–98)
PHOSPHATE SERPL-MCNC: 3.2 MG/DL (ref 2.3–4.1)
PLATELET # BLD AUTO: 172 THOUSANDS/UL (ref 149–390)
PMV BLD AUTO: 12.4 FL (ref 8.9–12.7)
POTASSIUM SERPL-SCNC: 4.6 MMOL/L (ref 3.5–5.3)
PROT UR-MCNC: 42 MG/DL
PROT/CREAT UR: 1.02 MG/G{CREAT} (ref 0–0.1)
RBC # BLD AUTO: 3.72 MILLION/UL (ref 3.88–5.62)
SODIUM SERPL-SCNC: 139 MMOL/L (ref 136–145)
TIBC SERPL-MCNC: 283 UG/DL (ref 250–450)
WBC # BLD AUTO: 10.48 THOUSAND/UL (ref 4.31–10.16)

## 2021-09-27 PROCEDURE — 84100 ASSAY OF PHOSPHORUS: CPT

## 2021-09-27 PROCEDURE — 80048 BASIC METABOLIC PNL TOTAL CA: CPT

## 2021-09-27 PROCEDURE — 82570 ASSAY OF URINE CREATININE: CPT

## 2021-09-27 PROCEDURE — 82728 ASSAY OF FERRITIN: CPT

## 2021-09-27 PROCEDURE — 83735 ASSAY OF MAGNESIUM: CPT

## 2021-09-27 PROCEDURE — 84156 ASSAY OF PROTEIN URINE: CPT

## 2021-09-27 PROCEDURE — 83550 IRON BINDING TEST: CPT

## 2021-09-27 PROCEDURE — 36415 COLL VENOUS BLD VENIPUNCTURE: CPT

## 2021-09-27 PROCEDURE — 85027 COMPLETE CBC AUTOMATED: CPT

## 2021-09-27 PROCEDURE — 83540 ASSAY OF IRON: CPT

## 2021-09-27 NOTE — TELEPHONE ENCOUNTER
----- Message from Dick Fothergill, MD sent at 9/27/2021  1:02 PM EDT -----  Please inform patient that creatinine slightly trended up to 2 36 on blood work from 09/26 compared to previous creatinine of 2 0 but this is still within his baseline  Please  stress on oral hydration  Please ask patient to do another BMP before the office visit in October with advanced practitioner

## 2021-10-08 ENCOUNTER — HOSPITAL ENCOUNTER (OUTPATIENT)
Dept: RADIOLOGY | Facility: HOSPITAL | Age: 81
Discharge: HOME | End: 2021-10-08
Attending: UROLOGY
Payer: MEDICARE

## 2021-10-08 ENCOUNTER — OFFICE VISIT (OUTPATIENT)
Dept: DIABETES SERVICES | Facility: CLINIC | Age: 81
End: 2021-10-08
Payer: MEDICARE

## 2021-10-08 VITALS — BODY MASS INDEX: 27.06 KG/M2 | WEIGHT: 178 LBS

## 2021-10-08 DIAGNOSIS — C67.8 MALIGNANT NEOPLASM OF OVERLAPPING SITES OF BLADDER (HCC): ICD-10-CM

## 2021-10-08 DIAGNOSIS — E11.3299 TYPE 2 DIABETES MELLITUS WITH MILD NONPROLIFERATIVE RETINOPATHY WITHOUT MACULAR EDEMA, WITHOUT LONG-TERM CURRENT USE OF INSULIN, UNSPECIFIED LATERALITY (HCC): Primary | ICD-10-CM

## 2021-10-08 PROCEDURE — 71046 X-RAY EXAM CHEST 2 VIEWS: CPT

## 2021-10-08 PROCEDURE — 74176 CT ABD & PELVIS W/O CONTRAST: CPT

## 2021-10-08 PROCEDURE — 97803 MED NUTRITION INDIV SUBSEQ: CPT | Performed by: DIETITIAN, REGISTERED

## 2021-10-08 PROCEDURE — G1004 CDSM NDSC: HCPCS

## 2021-10-11 ENCOUNTER — APPOINTMENT (OUTPATIENT)
Dept: LAB | Facility: CLINIC | Age: 81
End: 2021-10-11
Payer: MEDICARE

## 2021-10-11 DIAGNOSIS — N18.4 STAGE 4 CHRONIC KIDNEY DISEASE (HCC): ICD-10-CM

## 2021-10-11 LAB
ANION GAP SERPL CALCULATED.3IONS-SCNC: 4 MMOL/L (ref 4–13)
BUN SERPL-MCNC: 49 MG/DL (ref 5–25)
CALCIUM SERPL-MCNC: 9.2 MG/DL (ref 8.3–10.1)
CHLORIDE SERPL-SCNC: 109 MMOL/L (ref 100–108)
CO2 SERPL-SCNC: 25 MMOL/L (ref 21–32)
CREAT SERPL-MCNC: 2.49 MG/DL (ref 0.6–1.3)
GFR SERPL CREATININE-BSD FRML MDRD: 23 ML/MIN/1.73SQ M
GLUCOSE SERPL-MCNC: 154 MG/DL (ref 65–140)
POTASSIUM SERPL-SCNC: 4.1 MMOL/L (ref 3.5–5.3)
SODIUM SERPL-SCNC: 138 MMOL/L (ref 136–145)

## 2021-10-11 PROCEDURE — 36415 COLL VENOUS BLD VENIPUNCTURE: CPT

## 2021-10-11 PROCEDURE — 80048 BASIC METABOLIC PNL TOTAL CA: CPT

## 2021-10-14 ENCOUNTER — OFFICE VISIT (OUTPATIENT)
Dept: UROLOGY | Facility: AMBULATORY SURGERY CENTER | Age: 81
End: 2021-10-14
Payer: MEDICARE

## 2021-10-14 VITALS
HEART RATE: 72 BPM | DIASTOLIC BLOOD PRESSURE: 72 MMHG | WEIGHT: 172 LBS | HEIGHT: 68 IN | BODY MASS INDEX: 26.07 KG/M2 | SYSTOLIC BLOOD PRESSURE: 130 MMHG

## 2021-10-14 DIAGNOSIS — D09.0 CARCINOMA IN SITU OF BLADDER: Primary | ICD-10-CM

## 2021-10-14 PROCEDURE — 99214 OFFICE O/P EST MOD 30 MIN: CPT | Performed by: UROLOGY

## 2021-10-24 PROBLEM — M89.9 CHRONIC KIDNEY DISEASE-MINERAL AND BONE DISORDER: Status: ACTIVE | Noted: 2021-10-24

## 2021-10-24 PROBLEM — E83.9 CHRONIC KIDNEY DISEASE-MINERAL AND BONE DISORDER: Status: ACTIVE | Noted: 2021-10-24

## 2021-10-24 PROBLEM — N18.9 CHRONIC KIDNEY DISEASE-MINERAL AND BONE DISORDER: Status: ACTIVE | Noted: 2021-10-24

## 2021-10-25 ENCOUNTER — OFFICE VISIT (OUTPATIENT)
Dept: NEPHROLOGY | Facility: CLINIC | Age: 81
End: 2021-10-25
Payer: MEDICARE

## 2021-10-25 VITALS
WEIGHT: 179.6 LBS | HEART RATE: 52 BPM | DIASTOLIC BLOOD PRESSURE: 68 MMHG | HEIGHT: 68 IN | BODY MASS INDEX: 27.22 KG/M2 | SYSTOLIC BLOOD PRESSURE: 126 MMHG

## 2021-10-25 DIAGNOSIS — D63.1 ANEMIA DUE TO STAGE 4 CHRONIC KIDNEY DISEASE (HCC): ICD-10-CM

## 2021-10-25 DIAGNOSIS — M89.9 CHRONIC KIDNEY DISEASE-MINERAL AND BONE DISORDER: ICD-10-CM

## 2021-10-25 DIAGNOSIS — E11.59 TYPE 2 DIABETES MELLITUS WITH OTHER CIRCULATORY COMPLICATION, WITHOUT LONG-TERM CURRENT USE OF INSULIN (HCC): ICD-10-CM

## 2021-10-25 DIAGNOSIS — E87.2 METABOLIC ACIDOSIS: ICD-10-CM

## 2021-10-25 DIAGNOSIS — E83.9 CHRONIC KIDNEY DISEASE-MINERAL AND BONE DISORDER: ICD-10-CM

## 2021-10-25 DIAGNOSIS — Z85.51 HISTORY OF BLADDER CANCER: ICD-10-CM

## 2021-10-25 DIAGNOSIS — I10 BENIGN ESSENTIAL HYPERTENSION: ICD-10-CM

## 2021-10-25 DIAGNOSIS — N18.9 CHRONIC KIDNEY DISEASE-MINERAL AND BONE DISORDER: ICD-10-CM

## 2021-10-25 DIAGNOSIS — R80.1 PERSISTENT PROTEINURIA: ICD-10-CM

## 2021-10-25 DIAGNOSIS — N18.4 STAGE 4 CHRONIC KIDNEY DISEASE (HCC): Primary | ICD-10-CM

## 2021-10-25 DIAGNOSIS — E83.42 HYPOMAGNESEMIA: ICD-10-CM

## 2021-10-25 DIAGNOSIS — N18.4 ANEMIA DUE TO STAGE 4 CHRONIC KIDNEY DISEASE (HCC): ICD-10-CM

## 2021-10-25 PROCEDURE — 99214 OFFICE O/P EST MOD 30 MIN: CPT | Performed by: NURSE PRACTITIONER

## 2021-10-25 RX ORDER — FERROUS SULFATE TAB EC 324 MG (65 MG FE EQUIVALENT) 324 (65 FE) MG
324 TABLET DELAYED RESPONSE ORAL DAILY
Qty: 30 TABLET | Refills: 5 | Status: SHIPPED | OUTPATIENT
Start: 2021-10-25

## 2021-11-03 DIAGNOSIS — E11.3293 TYPE 2 DIABETES MELLITUS WITH BOTH EYES AFFECTED BY MILD NONPROLIFERATIVE RETINOPATHY WITHOUT MACULAR EDEMA, WITHOUT LONG-TERM CURRENT USE OF INSULIN (HCC): ICD-10-CM

## 2021-11-03 RX ORDER — DAPAGLIFLOZIN 5 MG/1
TABLET, FILM COATED ORAL
Qty: 30 TABLET | Refills: 2 | Status: SHIPPED | OUTPATIENT
Start: 2021-11-03 | End: 2022-02-09

## 2021-11-05 DIAGNOSIS — C67.8 MALIGNANT NEOPLASM OF OVERLAPPING SITES OF BLADDER (HCC): ICD-10-CM

## 2021-11-05 RX ORDER — TRAMADOL HYDROCHLORIDE 50 MG/1
TABLET ORAL
Qty: 20 TABLET | Refills: 0 | Status: SHIPPED | OUTPATIENT
Start: 2021-11-05 | End: 2022-01-07

## 2021-11-23 ENCOUNTER — OFFICE VISIT (OUTPATIENT)
Dept: INTERNAL MEDICINE CLINIC | Facility: CLINIC | Age: 81
End: 2021-11-23
Payer: MEDICARE

## 2021-11-23 VITALS
RESPIRATION RATE: 16 BRPM | WEIGHT: 177 LBS | DIASTOLIC BLOOD PRESSURE: 72 MMHG | SYSTOLIC BLOOD PRESSURE: 120 MMHG | TEMPERATURE: 95.4 F | OXYGEN SATURATION: 96 % | BODY MASS INDEX: 28.45 KG/M2 | HEART RATE: 74 BPM | HEIGHT: 66 IN

## 2021-11-23 DIAGNOSIS — E11.59 TYPE 2 DIABETES MELLITUS WITH OTHER CIRCULATORY COMPLICATION, WITHOUT LONG-TERM CURRENT USE OF INSULIN (HCC): ICD-10-CM

## 2021-11-23 DIAGNOSIS — Z12.12 SCREENING FOR COLORECTAL CANCER: ICD-10-CM

## 2021-11-23 DIAGNOSIS — M89.9 CHRONIC KIDNEY DISEASE-MINERAL AND BONE DISORDER: ICD-10-CM

## 2021-11-23 DIAGNOSIS — R44.1 VISUAL HALLUCINATIONS: Primary | ICD-10-CM

## 2021-11-23 DIAGNOSIS — I25.10 CORONARY ARTERY DISEASE INVOLVING NATIVE CORONARY ARTERY OF NATIVE HEART WITHOUT ANGINA PECTORIS: ICD-10-CM

## 2021-11-23 DIAGNOSIS — Z12.11 SCREENING FOR COLORECTAL CANCER: ICD-10-CM

## 2021-11-23 DIAGNOSIS — K22.70 BARRETT'S ESOPHAGUS WITH ESOPHAGITIS: ICD-10-CM

## 2021-11-23 DIAGNOSIS — E83.9 CHRONIC KIDNEY DISEASE-MINERAL AND BONE DISORDER: ICD-10-CM

## 2021-11-23 DIAGNOSIS — N18.9 CHRONIC KIDNEY DISEASE-MINERAL AND BONE DISORDER: ICD-10-CM

## 2021-11-23 DIAGNOSIS — R94.5 LIVER FUNCTION STUDY, ABNORMAL: ICD-10-CM

## 2021-11-23 DIAGNOSIS — I10 BENIGN ESSENTIAL HYPERTENSION: ICD-10-CM

## 2021-11-23 DIAGNOSIS — D49.4 BLADDER TUMOR: ICD-10-CM

## 2021-11-23 DIAGNOSIS — Z00.00 MEDICARE ANNUAL WELLNESS VISIT, SUBSEQUENT: ICD-10-CM

## 2021-11-23 DIAGNOSIS — K20.90 BARRETT'S ESOPHAGUS WITH ESOPHAGITIS: ICD-10-CM

## 2021-11-23 DIAGNOSIS — Z23 ENCOUNTER FOR IMMUNIZATION: ICD-10-CM

## 2021-11-23 PROBLEM — R19.4 ENCOUNTER FOR DIAGNOSTIC COLONOSCOPY DUE TO CHANGE IN BOWEL HABITS: Status: ACTIVE | Noted: 2019-05-23

## 2021-11-23 PROCEDURE — 90662 IIV NO PRSV INCREASED AG IM: CPT | Performed by: INTERNAL MEDICINE

## 2021-11-23 PROCEDURE — 99214 OFFICE O/P EST MOD 30 MIN: CPT | Performed by: INTERNAL MEDICINE

## 2021-11-23 PROCEDURE — G0008 ADMIN INFLUENZA VIRUS VAC: HCPCS | Performed by: INTERNAL MEDICINE

## 2021-11-23 PROCEDURE — G0439 PPPS, SUBSEQ VISIT: HCPCS | Performed by: INTERNAL MEDICINE

## 2021-11-23 PROCEDURE — 1123F ACP DISCUSS/DSCN MKR DOCD: CPT | Performed by: INTERNAL MEDICINE

## 2021-11-24 ENCOUNTER — OFFICE VISIT (OUTPATIENT)
Dept: ENDOCRINOLOGY | Facility: CLINIC | Age: 81
End: 2021-11-24
Payer: MEDICARE

## 2021-11-24 VITALS
DIASTOLIC BLOOD PRESSURE: 62 MMHG | HEART RATE: 60 BPM | WEIGHT: 181 LBS | HEIGHT: 66 IN | BODY MASS INDEX: 29.09 KG/M2 | SYSTOLIC BLOOD PRESSURE: 122 MMHG

## 2021-11-24 DIAGNOSIS — E78.2 MIXED HYPERLIPIDEMIA: ICD-10-CM

## 2021-11-24 DIAGNOSIS — N18.4 STAGE 4 CHRONIC KIDNEY DISEASE (HCC): ICD-10-CM

## 2021-11-24 DIAGNOSIS — E11.59 TYPE 2 DIABETES MELLITUS WITH OTHER CIRCULATORY COMPLICATION, WITHOUT LONG-TERM CURRENT USE OF INSULIN (HCC): Primary | ICD-10-CM

## 2021-11-24 DIAGNOSIS — E11.3293 TYPE 2 DIABETES MELLITUS WITH BOTH EYES AFFECTED BY MILD NONPROLIFERATIVE RETINOPATHY WITHOUT MACULAR EDEMA, WITHOUT LONG-TERM CURRENT USE OF INSULIN (HCC): ICD-10-CM

## 2021-11-24 LAB — SL AMB POCT HEMOGLOBIN AIC: 5.7 (ref ?–6.5)

## 2021-11-24 PROCEDURE — 99213 OFFICE O/P EST LOW 20 MIN: CPT | Performed by: INTERNAL MEDICINE

## 2021-11-24 PROCEDURE — 83036 HEMOGLOBIN GLYCOSYLATED A1C: CPT | Performed by: INTERNAL MEDICINE

## 2021-11-26 ENCOUNTER — TELEPHONE (OUTPATIENT)
Dept: INTERNAL MEDICINE CLINIC | Facility: CLINIC | Age: 81
End: 2021-11-26

## 2021-11-29 ENCOUNTER — APPOINTMENT (OUTPATIENT)
Dept: LAB | Facility: CLINIC | Age: 81
End: 2021-11-29
Payer: MEDICARE

## 2021-11-29 DIAGNOSIS — Z00.00 MEDICARE ANNUAL WELLNESS VISIT, SUBSEQUENT: ICD-10-CM

## 2021-11-29 DIAGNOSIS — E11.59 TYPE 2 DIABETES MELLITUS WITH OTHER CIRCULATORY COMPLICATION, WITHOUT LONG-TERM CURRENT USE OF INSULIN (HCC): ICD-10-CM

## 2021-11-29 DIAGNOSIS — E83.9 CHRONIC KIDNEY DISEASE-MINERAL AND BONE DISORDER: ICD-10-CM

## 2021-11-29 DIAGNOSIS — N18.4 STAGE 4 CHRONIC KIDNEY DISEASE (HCC): ICD-10-CM

## 2021-11-29 DIAGNOSIS — R94.5 LIVER FUNCTION STUDY, ABNORMAL: ICD-10-CM

## 2021-11-29 DIAGNOSIS — M89.9 CHRONIC KIDNEY DISEASE-MINERAL AND BONE DISORDER: ICD-10-CM

## 2021-11-29 DIAGNOSIS — I10 BENIGN ESSENTIAL HYPERTENSION: ICD-10-CM

## 2021-11-29 DIAGNOSIS — N18.9 CHRONIC KIDNEY DISEASE-MINERAL AND BONE DISORDER: ICD-10-CM

## 2021-11-29 LAB
ALBUMIN SERPL BCP-MCNC: 3.6 G/DL (ref 3.5–5)
ALP SERPL-CCNC: 89 U/L (ref 46–116)
ALT SERPL W P-5'-P-CCNC: 23 U/L (ref 12–78)
ANION GAP SERPL CALCULATED.3IONS-SCNC: 5 MMOL/L (ref 4–13)
AST SERPL W P-5'-P-CCNC: 13 U/L (ref 5–45)
BACTERIA UR QL AUTO: ABNORMAL /HPF
BASOPHILS # BLD AUTO: 0.07 THOUSANDS/ΜL (ref 0–0.1)
BASOPHILS NFR BLD AUTO: 1 % (ref 0–1)
BILIRUB SERPL-MCNC: 0.49 MG/DL (ref 0.2–1)
BILIRUB UR QL STRIP: NEGATIVE
BUN SERPL-MCNC: 55 MG/DL (ref 5–25)
CALCIUM SERPL-MCNC: 9.1 MG/DL (ref 8.3–10.1)
CHLORIDE SERPL-SCNC: 112 MMOL/L (ref 100–108)
CHOLEST SERPL-MCNC: 143 MG/DL
CLARITY UR: ABNORMAL
CO2 SERPL-SCNC: 22 MMOL/L (ref 21–32)
COLOR UR: YELLOW
CREAT SERPL-MCNC: 2.44 MG/DL (ref 0.6–1.3)
CREAT UR-MCNC: 43.6 MG/DL
EOSINOPHIL # BLD AUTO: 0.3 THOUSAND/ΜL (ref 0–0.61)
EOSINOPHIL NFR BLD AUTO: 3 % (ref 0–6)
ERYTHROCYTE [DISTWIDTH] IN BLOOD BY AUTOMATED COUNT: 14.4 % (ref 11.6–15.1)
GFR SERPL CREATININE-BSD FRML MDRD: 24 ML/MIN/1.73SQ M
GLUCOSE P FAST SERPL-MCNC: 134 MG/DL (ref 65–99)
GLUCOSE UR STRIP-MCNC: ABNORMAL MG/DL
HCT VFR BLD AUTO: 38.8 % (ref 36.5–49.3)
HDLC SERPL-MCNC: 64 MG/DL
HGB BLD-MCNC: 12.6 G/DL (ref 12–17)
HGB UR QL STRIP.AUTO: ABNORMAL
IMM GRANULOCYTES # BLD AUTO: 0.05 THOUSAND/UL (ref 0–0.2)
IMM GRANULOCYTES NFR BLD AUTO: 1 % (ref 0–2)
KETONES UR STRIP-MCNC: NEGATIVE MG/DL
LDLC SERPL CALC-MCNC: 65 MG/DL (ref 0–100)
LEUKOCYTE ESTERASE UR QL STRIP: ABNORMAL
LYMPHOCYTES # BLD AUTO: 1.65 THOUSANDS/ΜL (ref 0.6–4.47)
LYMPHOCYTES NFR BLD AUTO: 16 % (ref 14–44)
MCH RBC QN AUTO: 30.9 PG (ref 26.8–34.3)
MCHC RBC AUTO-ENTMCNC: 32.5 G/DL (ref 31.4–37.4)
MCV RBC AUTO: 95 FL (ref 82–98)
MICROALBUMIN UR-MCNC: 40.8 MG/L (ref 0–20)
MICROALBUMIN/CREAT 24H UR: 94 MG/G CREATININE (ref 0–30)
MONOCYTES # BLD AUTO: 0.85 THOUSAND/ΜL (ref 0.17–1.22)
MONOCYTES NFR BLD AUTO: 8 % (ref 4–12)
NEUTROPHILS # BLD AUTO: 7.6 THOUSANDS/ΜL (ref 1.85–7.62)
NEUTS SEG NFR BLD AUTO: 71 % (ref 43–75)
NITRITE UR QL STRIP: NEGATIVE
NON-SQ EPI CELLS URNS QL MICRO: ABNORMAL /HPF
NONHDLC SERPL-MCNC: 79 MG/DL
NRBC BLD AUTO-RTO: 0 /100 WBCS
PH UR STRIP.AUTO: 8.5 [PH]
PLATELET # BLD AUTO: 170 THOUSANDS/UL (ref 149–390)
PMV BLD AUTO: 12.6 FL (ref 8.9–12.7)
POTASSIUM SERPL-SCNC: 4.5 MMOL/L (ref 3.5–5.3)
PROT SERPL-MCNC: 7.4 G/DL (ref 6.4–8.2)
PROT UR STRIP-MCNC: ABNORMAL MG/DL
RBC # BLD AUTO: 4.08 MILLION/UL (ref 3.88–5.62)
RBC #/AREA URNS AUTO: ABNORMAL /HPF
SODIUM SERPL-SCNC: 139 MMOL/L (ref 136–145)
SP GR UR STRIP.AUTO: 1.01 (ref 1–1.03)
TRIGL SERPL-MCNC: 69 MG/DL
UROBILINOGEN UR QL STRIP.AUTO: 0.2 E.U./DL
WBC # BLD AUTO: 10.52 THOUSAND/UL (ref 4.31–10.16)
WBC #/AREA URNS AUTO: ABNORMAL /HPF

## 2021-11-29 PROCEDURE — 83036 HEMOGLOBIN GLYCOSYLATED A1C: CPT

## 2021-11-29 PROCEDURE — 85025 COMPLETE CBC W/AUTO DIFF WBC: CPT

## 2021-11-29 PROCEDURE — 36415 COLL VENOUS BLD VENIPUNCTURE: CPT

## 2021-11-29 PROCEDURE — 82043 UR ALBUMIN QUANTITATIVE: CPT | Performed by: INTERNAL MEDICINE

## 2021-11-29 PROCEDURE — 80061 LIPID PANEL: CPT

## 2021-11-29 PROCEDURE — 81001 URINALYSIS AUTO W/SCOPE: CPT

## 2021-11-29 PROCEDURE — 80053 COMPREHEN METABOLIC PANEL: CPT

## 2021-11-29 PROCEDURE — 82570 ASSAY OF URINE CREATININE: CPT | Performed by: INTERNAL MEDICINE

## 2021-11-30 LAB
EST. AVERAGE GLUCOSE BLD GHB EST-MCNC: 126 MG/DL
HBA1C MFR BLD: 6 %

## 2021-12-13 DIAGNOSIS — F32.A ANXIETY AND DEPRESSION: ICD-10-CM

## 2021-12-13 DIAGNOSIS — F41.9 ANXIETY AND DEPRESSION: ICD-10-CM

## 2021-12-13 RX ORDER — CITALOPRAM 20 MG/1
TABLET ORAL
Qty: 30 TABLET | Refills: 3 | Status: SHIPPED | OUTPATIENT
Start: 2021-12-13 | End: 2022-05-24

## 2021-12-27 ENCOUNTER — PREPPED CHART (OUTPATIENT)
Dept: URBAN - METROPOLITAN AREA CLINIC 6 | Facility: CLINIC | Age: 81
End: 2021-12-27

## 2022-01-06 DIAGNOSIS — C67.8 MALIGNANT NEOPLASM OF OVERLAPPING SITES OF BLADDER (HCC): ICD-10-CM

## 2022-01-07 RX ORDER — TRAMADOL HYDROCHLORIDE 50 MG/1
TABLET ORAL
Qty: 20 TABLET | Refills: 0 | Status: SHIPPED | OUTPATIENT
Start: 2022-01-07

## 2022-02-02 ENCOUNTER — TELEPHONE (OUTPATIENT)
Dept: OTHER | Facility: OTHER | Age: 82
End: 2022-02-02

## 2022-02-02 NOTE — TELEPHONE ENCOUNTER
Patient called today regarding his Procedure on February 8, 2022  Patient has Questions about what type of Procedure will be done and is there any preparation for the Procedure

## 2022-02-02 NOTE — TELEPHONE ENCOUNTER
Spoke to patient and provided information regarding his appointment  Advised of how the Cysto will be and advised to come into the office with a comfortably full bladder  Patient is understanding  Appointment and location confirmed  Advised to contact the office in the meantime with any concerns

## 2022-02-07 ENCOUNTER — APPOINTMENT (OUTPATIENT)
Dept: LAB | Facility: CLINIC | Age: 82
End: 2022-02-07
Payer: MEDICARE

## 2022-02-07 DIAGNOSIS — N18.4 STAGE 4 CHRONIC KIDNEY DISEASE (HCC): ICD-10-CM

## 2022-02-07 LAB
25(OH)D3 SERPL-MCNC: 31.1 NG/ML (ref 30–100)
ALBUMIN SERPL BCP-MCNC: 3.8 G/DL (ref 3.5–5)
ANION GAP SERPL CALCULATED.3IONS-SCNC: 5 MMOL/L (ref 4–13)
BASOPHILS # BLD AUTO: 0.07 THOUSANDS/ΜL (ref 0–0.1)
BASOPHILS NFR BLD AUTO: 1 % (ref 0–1)
BUN SERPL-MCNC: 51 MG/DL (ref 5–25)
CALCIUM SERPL-MCNC: 9.3 MG/DL (ref 8.3–10.1)
CHLORIDE SERPL-SCNC: 108 MMOL/L (ref 100–108)
CO2 SERPL-SCNC: 25 MMOL/L (ref 21–32)
CREAT SERPL-MCNC: 2.34 MG/DL (ref 0.6–1.3)
CREAT UR-MCNC: 44.6 MG/DL
EOSINOPHIL # BLD AUTO: 0.29 THOUSAND/ΜL (ref 0–0.61)
EOSINOPHIL NFR BLD AUTO: 2 % (ref 0–6)
ERYTHROCYTE [DISTWIDTH] IN BLOOD BY AUTOMATED COUNT: 14.5 % (ref 11.6–15.1)
GFR SERPL CREATININE-BSD FRML MDRD: 25 ML/MIN/1.73SQ M
GLUCOSE P FAST SERPL-MCNC: 144 MG/DL (ref 65–99)
HCT VFR BLD AUTO: 41.8 % (ref 36.5–49.3)
HGB BLD-MCNC: 13.2 G/DL (ref 12–17)
IMM GRANULOCYTES # BLD AUTO: 0.04 THOUSAND/UL (ref 0–0.2)
IMM GRANULOCYTES NFR BLD AUTO: 0 % (ref 0–2)
LYMPHOCYTES # BLD AUTO: 1.89 THOUSANDS/ΜL (ref 0.6–4.47)
LYMPHOCYTES NFR BLD AUTO: 15 % (ref 14–44)
MAGNESIUM SERPL-MCNC: 2.2 MG/DL (ref 1.6–2.6)
MCH RBC QN AUTO: 30.8 PG (ref 26.8–34.3)
MCHC RBC AUTO-ENTMCNC: 31.6 G/DL (ref 31.4–37.4)
MCV RBC AUTO: 97 FL (ref 82–98)
MONOCYTES # BLD AUTO: 1.06 THOUSAND/ΜL (ref 0.17–1.22)
MONOCYTES NFR BLD AUTO: 8 % (ref 4–12)
NEUTROPHILS # BLD AUTO: 9.35 THOUSANDS/ΜL (ref 1.85–7.62)
NEUTS SEG NFR BLD AUTO: 74 % (ref 43–75)
NRBC BLD AUTO-RTO: 0 /100 WBCS
PHOSPHATE SERPL-MCNC: 3 MG/DL (ref 2.3–4.1)
PLATELET # BLD AUTO: 168 THOUSANDS/UL (ref 149–390)
PMV BLD AUTO: 12.5 FL (ref 8.9–12.7)
POTASSIUM SERPL-SCNC: 4.7 MMOL/L (ref 3.5–5.3)
PROT UR-MCNC: 37 MG/DL
PROT/CREAT UR: 0.83 MG/G{CREAT} (ref 0–0.1)
PTH-INTACT SERPL-MCNC: 141.8 PG/ML (ref 18.4–80.1)
RBC # BLD AUTO: 4.29 MILLION/UL (ref 3.88–5.62)
SODIUM SERPL-SCNC: 138 MMOL/L (ref 136–145)
WBC # BLD AUTO: 12.7 THOUSAND/UL (ref 4.31–10.16)

## 2022-02-07 PROCEDURE — 85025 COMPLETE CBC W/AUTO DIFF WBC: CPT

## 2022-02-07 PROCEDURE — 83735 ASSAY OF MAGNESIUM: CPT

## 2022-02-07 PROCEDURE — 84156 ASSAY OF PROTEIN URINE: CPT

## 2022-02-07 PROCEDURE — 83970 ASSAY OF PARATHORMONE: CPT

## 2022-02-07 PROCEDURE — 80069 RENAL FUNCTION PANEL: CPT

## 2022-02-07 PROCEDURE — 36415 COLL VENOUS BLD VENIPUNCTURE: CPT

## 2022-02-07 PROCEDURE — 82570 ASSAY OF URINE CREATININE: CPT

## 2022-02-07 PROCEDURE — 82306 VITAMIN D 25 HYDROXY: CPT

## 2022-02-08 ENCOUNTER — PROCEDURE VISIT (OUTPATIENT)
Dept: UROLOGY | Facility: AMBULATORY SURGERY CENTER | Age: 82
End: 2022-02-08
Payer: MEDICARE

## 2022-02-08 VITALS
DIASTOLIC BLOOD PRESSURE: 74 MMHG | HEART RATE: 58 BPM | BODY MASS INDEX: 29.09 KG/M2 | HEIGHT: 66 IN | SYSTOLIC BLOOD PRESSURE: 120 MMHG | WEIGHT: 181 LBS

## 2022-02-08 DIAGNOSIS — E11.3293 TYPE 2 DIABETES MELLITUS WITH BOTH EYES AFFECTED BY MILD NONPROLIFERATIVE RETINOPATHY WITHOUT MACULAR EDEMA, WITHOUT LONG-TERM CURRENT USE OF INSULIN (HCC): ICD-10-CM

## 2022-02-08 DIAGNOSIS — D09.0 CARCINOMA IN SITU OF BLADDER: Primary | ICD-10-CM

## 2022-02-08 DIAGNOSIS — C68.0 URETHRAL CARCINOMA (HCC): ICD-10-CM

## 2022-02-08 LAB
SL AMB  POCT GLUCOSE, UA: 250
SL AMB LEUKOCYTE ESTERASE,UA: NORMAL
SL AMB POCT BILIRUBIN,UA: NORMAL
SL AMB POCT BLOOD,UA: NORMAL
SL AMB POCT CLARITY,UA: NORMAL
SL AMB POCT COLOR,UA: YELLOW
SL AMB POCT KETONES,UA: NORMAL
SL AMB POCT NITRITE,UA: NORMAL
SL AMB POCT PH,UA: 8.5
SL AMB POCT SPECIFIC GRAVITY,UA: 1
SL AMB POCT URINE PROTEIN: NORMAL
SL AMB POCT UROBILINOGEN: 0.2

## 2022-02-08 PROCEDURE — 81002 URINALYSIS NONAUTO W/O SCOPE: CPT | Performed by: UROLOGY

## 2022-02-08 PROCEDURE — 88112 CYTOPATH CELL ENHANCE TECH: CPT | Performed by: PATHOLOGY

## 2022-02-08 PROCEDURE — 52000 CYSTOURETHROSCOPY: CPT | Performed by: UROLOGY

## 2022-02-08 PROCEDURE — 99214 OFFICE O/P EST MOD 30 MIN: CPT | Performed by: UROLOGY

## 2022-02-08 RX ORDER — SODIUM CHLORIDE 9 MG/ML
125 INJECTION, SOLUTION INTRAVENOUS CONTINUOUS
Status: CANCELLED | OUTPATIENT
Start: 2022-02-08

## 2022-02-08 RX ORDER — CEFAZOLIN SODIUM 2 G/50ML
2000 SOLUTION INTRAVENOUS ONCE
Status: CANCELLED | OUTPATIENT
Start: 2022-02-08 | End: 2022-02-08

## 2022-02-08 NOTE — LETTER
February 8, 2022     Sofi Negron DO  [address]  [address] [place] [address] [id number] [address]    Patient: Linus Orosco   YOB: 1940   Date of Visit: 2/8/2022       Dear Dr Georgie Isabel: Thank you for referring Gen Miranda to me for evaluation  Below are my notes for this consultation  If you have questions, please do not hesitate to call me  I look forward to following your patient along with you  Sincerely,        Michaela Lenz MD        CC: MD Michaela Siddiqui MD  2/8/2022 10:40 AM  Sign when Signing Visit     Cystoscopy     Date/Time 2/8/2022 10:15 AM     Performed by  Michaela Lenz MD     Authorized by Michaela Lenz MD          Miguel Thurman is an 35-year-old male with a 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  He previously underwent radical cystoprostatectomy with ileal conduit creation  He presents today to undergo urethroscopy which I have recommended for surveillance of his urethra  He reports no issues with his ileal conduit  He states that his extra if surgery was performed in 2020 at the [address] [place] [address] by Dr Lang Wood  Risk and benefits of urethroscopy were discussed reviewed and consent was obtained  The patient was placed supine  Examination of his stoma reveals clear yellow urine and a pink and viable stoma  The meatus was prepped and draped in sterile fashion  Viscous lidocaine was instilled into the urethra and urethroscopy was performed  Throughout the urethra there appeared to be carpeting of superficial appearing urothelial carcinoma  Impression:  History of high-grade muscle invasive bladder cancer, chronic kidney disease, concern for urethral recurrence  Plan:  I recommend urethroscopy in the operating room with biopsies and fulguration of the urethral lesions  If this is urethral urothelial carcinoma I would recommend a referral back to Dr Lang Wood for completion urethrectomy  Risk and benefits of the procedure were discussed and reviewed at length informed consent was obtained

## 2022-02-08 NOTE — PROGRESS NOTES
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  He previously underwent radical cystoprostatectomy with ileal conduit creation  He presents today to undergo urethroscopy which I have recommended for surveillance of his urethra  He reports no issues with his ileal conduit  He states that his extra if surgery was performed in 2020 at the [address] [place] [address] by Dr Jake Carmen  Risk and benefits of urethroscopy were discussed reviewed and consent was obtained  The patient was placed supine  Examination of his stoma reveals clear yellow urine and a pink and viable stoma  The meatus was prepped and draped in sterile fashion  Viscous lidocaine was instilled into the urethra and urethroscopy was performed  Throughout the urethra there appeared to be carpeting of superficial appearing urothelial carcinoma  Impression:  History of high-grade muscle invasive bladder cancer, chronic kidney disease, concern for urethral recurrence  Plan:  I recommend urethroscopy in the operating room with biopsies and fulguration of the urethral lesions  If this is urethral urothelial carcinoma I would recommend a referral back to Dr Jake Carmen for completion urethrectomy  Risk and benefits of the procedure were discussed and reviewed at length informed consent was obtained

## 2022-02-09 ENCOUNTER — TELEPHONE (OUTPATIENT)
Dept: UROLOGY | Facility: AMBULATORY SURGERY CENTER | Age: 82
End: 2022-02-09

## 2022-02-09 ENCOUNTER — COMPLETE EYE EXAM (OUTPATIENT)
Dept: URBAN - METROPOLITAN AREA CLINIC 6 | Facility: CLINIC | Age: 82
End: 2022-02-09

## 2022-02-09 ENCOUNTER — TELEPHONE (OUTPATIENT)
Dept: NEPHROLOGY | Facility: HOSPITAL | Age: 82
End: 2022-02-09

## 2022-02-09 DIAGNOSIS — H35.3114: ICD-10-CM

## 2022-02-09 DIAGNOSIS — E11.3293: ICD-10-CM

## 2022-02-09 PROCEDURE — 92014 COMPRE OPH EXAM EST PT 1/>: CPT

## 2022-02-09 PROCEDURE — 92250 FUNDUS PHOTOGRAPHY W/I&R: CPT

## 2022-02-09 RX ORDER — DAPAGLIFLOZIN 5 MG/1
TABLET, FILM COATED ORAL
Qty: 30 TABLET | Refills: 2 | Status: SHIPPED | OUTPATIENT
Start: 2022-02-09 | End: 2022-05-03

## 2022-02-09 ASSESSMENT — VISUAL ACUITY
OD_CC: CF 2FT
OU_CC: J2
OS_CC: 20/60-1

## 2022-02-09 ASSESSMENT — TONOMETRY
OS_IOP_MMHG: 12
OD_IOP_MMHG: 13

## 2022-02-09 NOTE — TELEPHONE ENCOUNTER
Called patient and informed him of the following information:    Patients most recent creatinine remains stable  He needs three months follow-up with Dr Steve Rooney  I attempted to find something on the schedule but was unable  Patient is also asking about--  Rad Hensley- helps with kidney function  Patient is wondering if this is a medication he should start and if it would benefit him

## 2022-02-09 NOTE — TELEPHONE ENCOUNTER
I called pt this afternoon to discuss scheduling his procedure with Dr Cardenas Friends  There was no answer so I did leave a voicemail asking pt to call our office back to discuss

## 2022-02-09 NOTE — TELEPHONE ENCOUNTER
----- Message from Ely Kearney, [address]  sent at 2/7/2022 12:03 PM EST -----  Please call let patient know that his most recent creatinine remains stable    He needs three months follow-up with Dr Carolyn Frias he was last seen in October  Thank you  Tatyana Okeefe

## 2022-02-10 ENCOUNTER — PREP FOR PROCEDURE (OUTPATIENT)
Dept: UROLOGY | Facility: AMBULATORY SURGERY CENTER | Age: 82
End: 2022-02-10

## 2022-02-10 DIAGNOSIS — C67.9 BLADDER CARCINOMA (HCC): ICD-10-CM

## 2022-02-10 DIAGNOSIS — R39.89 SUSPECTED UTI: ICD-10-CM

## 2022-02-10 DIAGNOSIS — Z01.818 PREOP EXAMINATION: ICD-10-CM

## 2022-02-10 DIAGNOSIS — E11.59 TYPE 2 DIABETES MELLITUS WITH OTHER CIRCULATORY COMPLICATION, WITHOUT LONG-TERM CURRENT USE OF INSULIN (HCC): ICD-10-CM

## 2022-02-10 DIAGNOSIS — C68.0 URETHRAL CARCINOMA (HCC): Primary | ICD-10-CM

## 2022-02-10 DIAGNOSIS — Z01.810 PREOP CARDIOVASCULAR EXAM: ICD-10-CM

## 2022-02-10 DIAGNOSIS — Z01.812 PRE-PROCEDURE LAB EXAM: ICD-10-CM

## 2022-02-10 NOTE — TELEPHONE ENCOUNTER
I spoke with pt this afternoon and scheduled him for surgery with Dr Mahesh Kent at the [facility] on 4/5/2022  I verbally went over all of pt 's pre op testing and prep information with him  He is aware that his pre op labs, urine culture and EKG need to be done 2-3 weeks prior to surgery and he is also scheduled for a medical clearance appt with his PCP on 3/28/22  Per pt 's request I am mailing him a copy of his surgical packet and I instructed him to call our office with any questions or concerns regarding this procedure

## 2022-02-11 NOTE — TELEPHONE ENCOUNTER
I spoke with patient and relayed message, patient verbalized understanding with no questions or concerns  -States he will call us back to schedule a follow up

## 2022-03-07 DIAGNOSIS — I25.10 CORONARY ARTERY DISEASE INVOLVING NATIVE CORONARY ARTERY OF NATIVE HEART WITHOUT ANGINA PECTORIS: ICD-10-CM

## 2022-03-07 RX ORDER — ATORVASTATIN CALCIUM 40 MG/1
TABLET, FILM COATED ORAL
Qty: 90 TABLET | Refills: 3 | Status: SHIPPED | OUTPATIENT
Start: 2022-03-07

## 2022-03-15 ENCOUNTER — APPOINTMENT (OUTPATIENT)
Dept: LAB | Facility: HOSPITAL | Age: 82
End: 2022-03-15
Attending: UROLOGY
Payer: MEDICARE

## 2022-03-15 ENCOUNTER — OFFICE VISIT (OUTPATIENT)
Dept: LAB | Facility: HOSPITAL | Age: 82
End: 2022-03-15
Attending: UROLOGY
Payer: MEDICARE

## 2022-03-15 DIAGNOSIS — Z01.810 PREOP CARDIOVASCULAR EXAM: ICD-10-CM

## 2022-03-15 DIAGNOSIS — Z01.812 PRE-PROCEDURE LAB EXAM: ICD-10-CM

## 2022-03-15 DIAGNOSIS — Z01.818 PREOP EXAMINATION: ICD-10-CM

## 2022-03-15 DIAGNOSIS — C67.9 BLADDER CARCINOMA (HCC): ICD-10-CM

## 2022-03-15 DIAGNOSIS — C68.0 URETHRAL CARCINOMA (HCC): ICD-10-CM

## 2022-03-15 DIAGNOSIS — R39.89 SUSPECTED UTI: ICD-10-CM

## 2022-03-15 DIAGNOSIS — E11.59 TYPE 2 DIABETES MELLITUS WITH OTHER CIRCULATORY COMPLICATION, WITHOUT LONG-TERM CURRENT USE OF INSULIN (HCC): ICD-10-CM

## 2022-03-15 LAB
ANION GAP SERPL CALCULATED.3IONS-SCNC: 4 MMOL/L (ref 4–13)
ATRIAL RATE: 52 BPM
BASOPHILS # BLD AUTO: 0.05 THOUSANDS/ΜL (ref 0–0.1)
BASOPHILS NFR BLD AUTO: 1 % (ref 0–1)
BUN SERPL-MCNC: 45 MG/DL (ref 5–25)
CALCIUM SERPL-MCNC: 9.4 MG/DL (ref 8.3–10.1)
CHLORIDE SERPL-SCNC: 109 MMOL/L (ref 100–108)
CO2 SERPL-SCNC: 26 MMOL/L (ref 21–32)
CREAT SERPL-MCNC: 2.34 MG/DL (ref 0.6–1.3)
EOSINOPHIL # BLD AUTO: 0.21 THOUSAND/ΜL (ref 0–0.61)
EOSINOPHIL NFR BLD AUTO: 2 % (ref 0–6)
ERYTHROCYTE [DISTWIDTH] IN BLOOD BY AUTOMATED COUNT: 14 % (ref 11.6–15.1)
EST. AVERAGE GLUCOSE BLD GHB EST-MCNC: 131 MG/DL
GFR SERPL CREATININE-BSD FRML MDRD: 25 ML/MIN/1.73SQ M
GLUCOSE P FAST SERPL-MCNC: 136 MG/DL (ref 65–99)
HBA1C MFR BLD: 6.2 %
HCT VFR BLD AUTO: 40.8 % (ref 36.5–49.3)
HGB BLD-MCNC: 13.4 G/DL (ref 12–17)
IMM GRANULOCYTES # BLD AUTO: 0.06 THOUSAND/UL (ref 0–0.2)
IMM GRANULOCYTES NFR BLD AUTO: 1 % (ref 0–2)
LYMPHOCYTES # BLD AUTO: 1.16 THOUSANDS/ΜL (ref 0.6–4.47)
LYMPHOCYTES NFR BLD AUTO: 11 % (ref 14–44)
MCH RBC QN AUTO: 30.7 PG (ref 26.8–34.3)
MCHC RBC AUTO-ENTMCNC: 32.8 G/DL (ref 31.4–37.4)
MCV RBC AUTO: 94 FL (ref 82–98)
MONOCYTES # BLD AUTO: 0.88 THOUSAND/ΜL (ref 0.17–1.22)
MONOCYTES NFR BLD AUTO: 9 % (ref 4–12)
NEUTROPHILS # BLD AUTO: 7.82 THOUSANDS/ΜL (ref 1.85–7.62)
NEUTS SEG NFR BLD AUTO: 76 % (ref 43–75)
NRBC BLD AUTO-RTO: 0 /100 WBCS
P AXIS: 60 DEGREES
PLATELET # BLD AUTO: 144 THOUSANDS/UL (ref 149–390)
PMV BLD AUTO: 12.9 FL (ref 8.9–12.7)
POTASSIUM SERPL-SCNC: 4.9 MMOL/L (ref 3.5–5.3)
PR INTERVAL: 296 MS
QRS AXIS: -56 DEGREES
QRSD INTERVAL: 124 MS
QT INTERVAL: 462 MS
QTC INTERVAL: 429 MS
RBC # BLD AUTO: 4.36 MILLION/UL (ref 3.88–5.62)
SODIUM SERPL-SCNC: 139 MMOL/L (ref 136–145)
T WAVE AXIS: 17 DEGREES
VENTRICULAR RATE: 52 BPM
WBC # BLD AUTO: 10.18 THOUSAND/UL (ref 4.31–10.16)

## 2022-03-15 PROCEDURE — 80048 BASIC METABOLIC PNL TOTAL CA: CPT

## 2022-03-15 PROCEDURE — 93010 ELECTROCARDIOGRAM REPORT: CPT | Performed by: INTERNAL MEDICINE

## 2022-03-15 PROCEDURE — 83036 HEMOGLOBIN GLYCOSYLATED A1C: CPT

## 2022-03-15 PROCEDURE — 87077 CULTURE AEROBIC IDENTIFY: CPT

## 2022-03-15 PROCEDURE — 85025 COMPLETE CBC W/AUTO DIFF WBC: CPT

## 2022-03-15 PROCEDURE — 87086 URINE CULTURE/COLONY COUNT: CPT

## 2022-03-15 PROCEDURE — 36415 COLL VENOUS BLD VENIPUNCTURE: CPT

## 2022-03-15 PROCEDURE — 93005 ELECTROCARDIOGRAM TRACING: CPT

## 2022-03-15 PROCEDURE — 87186 SC STD MICRODIL/AGAR DIL: CPT

## 2022-03-18 ENCOUNTER — TELEPHONE (OUTPATIENT)
Dept: NEPHROLOGY | Facility: CLINIC | Age: 82
End: 2022-03-18

## 2022-03-18 LAB
BACTERIA UR CULT: ABNORMAL

## 2022-03-18 NOTE — TELEPHONE ENCOUNTER
Called to schedule his April f/u appointment and his wife answered and said she would have him call back

## 2022-03-25 ENCOUNTER — OFFICE VISIT (OUTPATIENT)
Dept: GASTROENTEROLOGY | Facility: CLINIC | Age: 82
End: 2022-03-25
Payer: MEDICARE

## 2022-03-25 VITALS
HEIGHT: 66 IN | SYSTOLIC BLOOD PRESSURE: 130 MMHG | RESPIRATION RATE: 18 BRPM | HEART RATE: 70 BPM | DIASTOLIC BLOOD PRESSURE: 68 MMHG | TEMPERATURE: 98.4 F | OXYGEN SATURATION: 97 % | WEIGHT: 181 LBS | BODY MASS INDEX: 29.09 KG/M2

## 2022-03-25 DIAGNOSIS — Z12.12 SCREENING FOR COLORECTAL CANCER: ICD-10-CM

## 2022-03-25 DIAGNOSIS — C67.9 BLADDER CARCINOMA (HCC): ICD-10-CM

## 2022-03-25 DIAGNOSIS — E43 SEVERE PROTEIN-CALORIE MALNUTRITION (HCC): ICD-10-CM

## 2022-03-25 DIAGNOSIS — K22.70 BARRETT'S ESOPHAGUS WITH ESOPHAGITIS: Primary | ICD-10-CM

## 2022-03-25 DIAGNOSIS — K20.90 BARRETT'S ESOPHAGUS WITH ESOPHAGITIS: Primary | ICD-10-CM

## 2022-03-25 DIAGNOSIS — Z00.00 MEDICARE ANNUAL WELLNESS VISIT, SUBSEQUENT: ICD-10-CM

## 2022-03-25 DIAGNOSIS — D69.6 PLATELETS DECREASED (HCC): ICD-10-CM

## 2022-03-25 DIAGNOSIS — K59.1 FUNCTIONAL DIARRHEA: ICD-10-CM

## 2022-03-25 DIAGNOSIS — Z12.11 SCREENING FOR COLORECTAL CANCER: ICD-10-CM

## 2022-03-25 DIAGNOSIS — I10 BENIGN ESSENTIAL HYPERTENSION: ICD-10-CM

## 2022-03-25 PROCEDURE — 99214 OFFICE O/P EST MOD 30 MIN: CPT | Performed by: INTERNAL MEDICINE

## 2022-03-25 NOTE — PROGRESS NOTES
[facility] - Outpatient Consultation  Trinity López 80 y o  male MRN: [id number]  Encounter: [id number]          ASSESSMENT AND PLAN:      1  Screening for colorectal cancer  He had personal history of colon polyps  Last colonoscopy was 5 years ago  He is due for surveillance colonoscopy  We discussed risks and benefits of ongoing surveillance colonoscopy  Given his good functional status and personal history of colon polyps, he will benefit from surveillance colonoscopy    2  Wright's esophagus with esophagitis  -status post photodynamic treatment several years ago through [address] [place] [address]  He had EGD several years ago  I will schedule him for surveillance EGD  3  Gastroesophageal reflux disease  -continue reflux precautions  -continue omeprazole    5  Functional diarrhea  Reports intermittent diarrhea and bloating  No blood in the stool please denies recent antibiotic use  Will do random biopsy during colonoscopy to rule out microscopic colitis    6  Bladder carcinoma [facility])  Status post radical cystoprostatectomy with ileal conduit creation    ______________________________________________________________________    HPI:  80-year-old male with bladder cancer status post radical cystoprostatectomy with ileal conduit creation here for colon cancer screening and management of reflux  He had multiple colonoscopies in the past   Last colonoscopy was 5 years ago  Polyps were removed  He is due for surveillance colonoscopy  Overall he has a good functional status  He has chronic reflux disease and Wright's esophagus status post photodynamic therapy several years ago  Her reflux symptoms are well controlled with omeprazole      REVIEW OF SYSTEMS:    CONSTITUTIONAL: Denies any fever, chills, rigors, and weight loss  HEENT: No earache or tinnitus  Denies hearing loss or visual disturbances  CARDIOVASCULAR: No chest pain or palpitations     RESPIRATORY: Denies any cough, hemoptysis, shortness of breath or dyspnea on exertion  GASTROINTESTINAL: As noted in the History of Present Illness  GENITOURINARY: No problems with urination  Denies any hematuria or dysuria  NEUROLOGIC: No dizziness or vertigo, denies headaches  MUSCULOSKELETAL: Denies any muscle or joint pain  SKIN: Denies skin rashes or itching  ENDOCRINE: Denies excessive thirst  Denies intolerance to heat or cold  PSYCHOSOCIAL: Denies depression or anxiety  Denies any recent memory loss         Historical Information   Past Medical History:   Diagnosis Date    [facility] kidney injury ([facility] )     Arthritis     Hands    Wright esophagus     BPH with obstruction/lower urinary tract symptoms     CAD (coronary artery disease)     Last assessed 09/16/15    Cancer ([facility] )     bladder    Cataract, acquired     Last assessed 10/10/17    Coronary artery disease     Diabetes mellitus ([facility] )     NIDDM    Diabetic neuropathy ([facility] )     Feet    Enlarged prostate with lower urinary tract symptoms (LUTS)     Last assessed 10/10/17    Erectile dysfunction     GERD (gastroesophageal reflux disease)     Last assessed 10/10/17    Hemoptysis     Last assessed 03/14/16    Hypercholesterolemia     Last assessed 10/10/17    Hypertension     Last assessed 10/10/17    Macular degeneration     Right eye is particularly affected    My[facility] ) 1998    OAB (overactive bladder)     Testicular hypofunction     Testicular hypogonadism     Last assessed 10/10/17    Tinnitus     Umbilical hernia     Last assessed 06/18/14    Urge incontinence of urine     Wears glasses      Past Surgical History:   Procedure Laterality Date    COLONOSCOPY      CORONARY ARTERY BYPASS GRAFT  07/16/2014    ABG x 4 LIMA to LAD,SVG to diagnoal 2 SVG to OM-1, SVG to PDA, resection of partial plerual mass    CT GUIDED PERC DRAINAGE CATHETER PLACEMENT  2/19/2021    CYSTOSCOPY  2013    CYSTOSCOPY  02/08/2022    [place]    ESOPHAGOGASTRODUODENOSCOPY      FL RETROGRADE PYELOGRAM  12/20/2018    FL RETROGRADE PYELOGRAM  12/5/2019    INGUINAL HERNIA REPAIR Bilateral 2015    IR DRAINAGE TUBE CHECK AND/OR REMOVAL  3/5/2021    PHOTODYNAMIC THERAPY      For Barretts esophagus    IL COLONOSCOPY FLX DX W/COLLJ SPEC WHEN PFRMD N/A 4/25/2016    Procedure: COLONOSCOPY;  Surgeon: Karen Grady MD;  Location:  GI LAB; Service: Gastroenterology    IL CYSTOURETHROSCOPY,BIOPSY N/A 9/10/2020    Procedure: CYSTOSCOPY, COLLECTION OF LEFT KIDNEY CYTOLOGY, BILATERAL RETROGRADE PYELOGRAM, BLADDER WALL BIOPSIES  AND Veronica Speed, RANDOM BLADDER TUMOR BIOPSIES;  Surgeon: Mel Modi MD;  Location: [address] [place] [address] MAIN OR;  Service: Urology    IL CYSTOURETHROSCOPY,FULGUR 2-5 CM LESN N/A 4/16/2020    Procedure: CYSTOSCOPY, TRANSURETHRAL RESECTION OF BLADDER TUMOR (TURBT);   Surgeon: Mel Modi MD;  [facility];  Service: Urology    IL CYSTOURETHROSCOPY,FULGUR <0 5 CM LESN N/A 12/5/2019    Procedure: CYSTO W/TURBT AND TRANSURETHRAL PROSTATE BIOPSY AND OPENING OF BLADDER NECK CONTRACTURE, B/L Retrograde pyelogram;  Surgeon: Mel Modi MD;  [facility];  Service: Urology    TONSILLECTOMY      TRANSURETHRAL RESECTION OF PROSTATE N/A 12/20/2018    Procedure: CYSTO, PHOTO SELECTIVE VAPORIZATION OF PROSTATE, B/L RETROGRADE PYELOGRAM, TURBT;  Surgeon: Mel Modi MD;  [facility];  Service: Urology    UMBILICAL HERNIA REPAIR  2012    UPPER GASTROINTESTINAL ENDOSCOPY       Social History   Social History     Substance and Sexual Activity   Alcohol Use Yes    Alcohol/week: 2 0 standard drinks    Types: 1 Glasses of wine, 1 Cans of beer per week    Comment: 1 drink daily- a mixed drink or wine Last 7/4/2020     Social History     Substance and Sexual Activity   Drug Use No     Social History     Tobacco Use   Smoking Status Former Smoker    Packs/day: 1 00    Years: 13 00    Pack years: 13 00    Types: Cigarettes    Quit date: 0    Years since quittin 2   Smokeless Tobacco Never Used     Family History   Problem Relation Age of Onset    Cancer Mother     Other Mother         Digestive System Complications    Diabetes Father     Heart disease Father     Hypertension Father     Coronary artery disease Father     Hyperlipidemia Father        Meds/Allergies       Current Outpatient Medications:     aspirin 81 mg chewable tablet    atorvastatin (LIPITOR) 40 mg tablet    Blood Glucose Monitoring Suppl (OneTouch Verio Flex System) w/Device KIT    Cholecalciferol (VITAMIN D) 50 MCG (2000 UT) tablet    citalopram (CeleXA) 20 mg tablet    Farxiga 5 MG TABS    ferrous sulfate 324 (65 Fe) mg    fluticasone (FLONASE) 50 mcg/act nasal spray    magnesium oxide (MAG-OX) 400 mg    metoprolol tartrate (LOPRESSOR) 50 mg tablet    nystatin (MYCOSTATIN) powder    omeprazole (PriLOSEC) 40 MG capsule    sodium bicarbonate 650 mg tablet    traMADol (ULTRAM) 50 mg tablet    vitamin E, tocopherol, 400 units capsule    acetaminophen (TYLENOL) 500 mg tablet    Lancets (OneTouch Delica Plus [id number]) MISC    metFORMIN (GLUCOPHAGE-XR) 500 mg 24 hr tablet    Multiple Vitamins-Minerals (OCUVITE-LUTEIN PO)    OneTouch Verio test strip    sodium chloride, PF, 0 9 %    Allergies   Allergen Reactions    Fentanyl Hallucinations    Morphine And Related Other (See Comments)     While having an MI patient received morphine and had adverse reaction but doesn't know what happened  Objective     Blood pressure 130/68, pulse 70, temperature 98 4 °F (36 9 °C), temperature source Tympanic, resp  rate 18, height 5' 6" (1 676 m), weight 82 1 kg (181 lb), SpO2 97 %  Body mass index is 29 21 kg/m²          PHYSICAL EXAM:      General Appearance:   Alert, cooperative, no distress   HEENT:   Normocephalic, atraumatic, anicteric      Neck:  Supple, symmetrical, trachea midline   Lungs:   Clear to auscultation bilaterally; no rales, rhonchi or wheezing; respirations unlabored    Heart[de-identified]   Regular rate and rhythm; no murmur, rub, or gallop  Abdomen:   Soft, non-tender, non-distended; normal bowel sounds; no masses, no organomegaly    Genitalia:   Deferred    Rectal:   Deferred    Extremities:  No cyanosis, clubbing or edema    Pulses:  2+ and symmetric    Skin:  No jaundice, rashes, or lesions    Lymph nodes:  No palpable cervical lymphadenopathy        Lab Results:   No visits with results within 1 Day(s) from this visit  Latest known visit with results is:   Office Visit on 03/15/2022   Component Date Value    Ventricular Rate 03/15/2022 52     Atrial Rate 03/15/2022 52     RI Interval 03/15/2022 296     QRSD Interval 03/15/2022 124     QT Interval 03/15/2022 462     QTC Interval 03/15/2022 429     P Axis 03/15/2022 60     QRS Axis 03/15/2022 -64     T Wave Axis 03/15/2022 17          Radiology Results:   No results found    Answers for HPI/ROS submitted by the patient on 3/20/2022  Chronicity: recurrent  Onset: more than 1 year ago  Onset quality: undetermined  diarrhea: Yes  flatus: Yes  Aggravated by: nothing  Relieved by: nothing

## 2022-03-25 NOTE — PATIENT INSTRUCTIONS
Colon/EGD scheduled for 7/25 with Dr Jose R Meza at [facility]  Instructions/ paper work given to pt  Prep-Miralax/Dulcolax

## 2022-03-28 ENCOUNTER — OFFICE VISIT (OUTPATIENT)
Dept: INTERNAL MEDICINE CLINIC | Facility: CLINIC | Age: 82
End: 2022-03-28
Payer: MEDICARE

## 2022-03-28 VITALS
HEIGHT: 66 IN | HEART RATE: 55 BPM | BODY MASS INDEX: 29.41 KG/M2 | OXYGEN SATURATION: 94 % | SYSTOLIC BLOOD PRESSURE: 132 MMHG | DIASTOLIC BLOOD PRESSURE: 84 MMHG | WEIGHT: 183 LBS | TEMPERATURE: 98 F | RESPIRATION RATE: 18 BRPM

## 2022-03-28 DIAGNOSIS — E11.59 TYPE 2 DIABETES MELLITUS WITH OTHER CIRCULATORY COMPLICATION, WITHOUT LONG-TERM CURRENT USE OF INSULIN (HCC): ICD-10-CM

## 2022-03-28 DIAGNOSIS — C67.5 MALIGNANT NEOPLASM OF URINARY BLADDER NECK (HCC): ICD-10-CM

## 2022-03-28 DIAGNOSIS — D63.1 ANEMIA DUE TO STAGE 4 CHRONIC KIDNEY DISEASE (HCC): Primary | ICD-10-CM

## 2022-03-28 DIAGNOSIS — N18.4 ANEMIA DUE TO STAGE 4 CHRONIC KIDNEY DISEASE (HCC): Primary | ICD-10-CM

## 2022-03-28 DIAGNOSIS — I10 BENIGN ESSENTIAL HYPERTENSION: ICD-10-CM

## 2022-03-28 DIAGNOSIS — I10 ESSENTIAL HYPERTENSION, BENIGN: ICD-10-CM

## 2022-03-28 PROCEDURE — 99214 OFFICE O/P EST MOD 30 MIN: CPT | Performed by: INTERNAL MEDICINE

## 2022-03-28 NOTE — ASSESSMENT & PLAN NOTE
Patient does have a history of benign essential hypertension  Blood pressure is controlled  Renal function is stable  He continues to follow-up with nephrology regarding his chronic kidney disease stage 4

## 2022-03-28 NOTE — PROGRESS NOTES
Assessment/Plan:    Malignant neoplasm of urinary bladder neck ([facility] )  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  Status resection of his performance ileal diversion  Patient has been following urologist here and had recent surveillance, ureteral scope finding abnormalities that may show recurrence of his cancer  Patient is going for procedure for biopsy and if recurrence of cancer to this area will be going back to [address] [place] [address] for re-evaluation  Patient is here today for preop evaluation  States he is doing relatively well  Parameters have been stable  Patient did have pre-admission testing performed including blood sugar testing with no abnormalities the preclude him undergoing procedures planned  He was told from a diabetes standpoint not to take any medication the day of the procedure  Patient did undergo physical exam today and is cleared for surgery as planned    Benign essential hypertension  Patient does have a history of benign essential hypertension  Blood pressure is controlled  Renal function is stable  He continues to follow-up with nephrology regarding his chronic kidney disease stage 4  DMII (diabetes mellitus, type 2) ([place] [address] )  Patient no longer is on metformin  Recent sugar shows adequate control at hemoglobin A1c 6 2  Patient admits that he is not always perfect with his diet  We will continue present medications surveillance check a fasting blood sugar hemoglobin A1c with his next visit  Lab Results   Component Value Date    HGBA1C 6 2 (H) 03/15/2022       Anemia  Did have a CBC performed prior to the visit  His hemoglobin is stable  He continues to take the iron supplements as directed previously  We will check fasting blood sugar hemoglobin A1c and CBC with his next visit         Diagnoses and all orders for this visit:    Anemia due to stage 4 chronic kidney disease (HCC)  -     CBC and differential; Future    Type 2 diabetes mellitus with other circulatory complication, without long-term current use of insulin (HCC)  -     Hemoglobin A1C; Future    Essential hypertension, benign  -     Basic metabolic panel; Future    Benign essential hypertension    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)          Subjective:      Patient ID: Sudarshan Goodwin is a 80 y o  male  A 80year-old male history of extensive medical problems who is here today for routine follow-up also preop evaluation prior to having procedure perform cystoscopy with evaluation possible recurrence of tumor to the ureter, history of bladder cancer status post resection of the bladder previously   Patient states in general doing well  Did have labs performed prior to the visit today we did discuss the results  The following portions of the patient's history were reviewed and updated as appropriate:   He  has a past medical history of Acute kidney injury ([facility] ), Arthritis, Wright esophagus, BPH with obstruction/lower urinary tract symptoms, CAD (coronary artery disease), Cancer ([facility] ), Cataract, acquired, Coronary artery disease, Diabetes mellitus ([facility] ), Diabetic neuropathy ([facility] ), Enlarged prostate with lower urinary tract symptoms (LUTS), Erectile dysfunction, GERD (gastroesophageal reflux disease), Hemoptysis, Hypercholesterolemia, Hypertension, Macular degeneration, Myocardial infarction ([facility] ) (1998), OAB (overactive bladder), Testicular hypofunction, Testicular hypogonadism, Tinnitus, Umbilical hernia, Urge incontinence of urine, and Wears glasses    He   Patient Active Problem List    Diagnosis Date Noted    Functional diarrhea 03/25/2022    Platelets decreased ([facility] ) 03/25/2022    Visual hallucinations 11/23/2021    Chronic kidney disease-mineral and bone disorder 10/24/2021    Stage 4 chronic kidney disease ([address] ) 07/19/2021    Persistent proteinuria 07/19/2021    Anemia 07/19/2021    RBBB 05/07/2021    Hypomagnesemia 56/71/2851    Metabolic acidosis 13/08/5437    Severe protein-calorie malnutrition ([facility] ) 02/19/2021    Sepsis ([facility] ) 02/19/2021    Right sided Pelvic abscess in male [facility]) 02/18/2021    Ambulatory dysfunction 02/16/2021    Frequent falls 02/16/2021    Anxiety and depression 12/22/2020    Overweight 12/22/2020    Fungal dermatitis 12/03/2020    Forearm mass, left 09/03/2020    Elevated troponin 07/21/2020    Liver function study, abnormal 06/25/2020    Malignant neoplasm of urinary bladder neck ([facility] ) 03/24/2020    Positive urinary cytology 11/05/2019    Coronary artery disease involving native coronary artery of native heart without angina pectoris 09/09/2019    Ischemic cardiomyopathy 09/09/2019    History of bladder cancer 07/30/2019    Colon cancer screening 05/23/2019    Closed fracture of upper end of right fibula 03/11/2019    Malignant neoplasm of overlapping sites of bladder ([facility] ) 01/10/2019    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/2019    Type 2 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 ([facility] ) 12/05/2018    Bladder tumor 11/01/2018    Overactive bladder 10/10/2018    Acute kidney injury ([place] [address] ) 05/30/2018    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    Backache 10/21/2013    Benign essential hypertension 10/11/2012    DMII (diabetes mellitus, type 2) ([facility] ) 10/11/2012    Hyperlipidemia 10/11/2012     He  has a past surgical history that includes pr colonoscopy flx dx w/collj spec when pfrmd (N/A, 4/25/2016); Coronary artery bypass graft (07/16/2014); Colonoscopy; Inguinal hernia repair (Bilateral, 2015); Umbilical hernia repair (2012); Esophagogastroduodenoscopy; Tonsillectomy; Photodynamic Therapy; Transurethral resection of prostate (N/A, 12/20/2018); FL retrograde pyelogram (12/20/2018); pr cystourethroscopy,fulgur <0 5 cm lesn (N/A, 12/5/2019); FL retrograde pyelogram (12/5/2019); pr cystourethroscopy,fulgur 2-5 cm lesn (N/A, 4/16/2020);  Upper gastrointestinal endoscopy; pr cystourethroscopy,biopsy (N/A, 9/10/2020); CT guided perc drainage catheter placeme (2/19/2021); IR drainage tube check and/or removal (3/5/2021); Cystoscopy (2013); and Cystoscopy (02/08/2022)  His family history includes Cancer in his mother; Coronary artery disease in his father; Diabetes in his father; Heart disease in his father; Hyperlipidemia in his father; Hypertension in his father; Other in his mother  He  reports that he quit smoking about 50 years ago  His smoking use included cigarettes  He has a 13 00 pack-year smoking history  He has never used smokeless tobacco  He reports current alcohol use of about 2 0 standard drinks of alcohol per week  He reports that he does not use drugs    Current Outpatient Medications   Medication Sig Dispense Refill    atorvastatin (LIPITOR) 40 mg tablet TAKE ONE TABLET BY MOUTH AT BEDTIME 90 tablet 3    Blood Glucose Monitoring Suppl (OneTouch Verio Flex System) w/Device KIT USE TO TEST BLOOD GLUCOSE DAILY 1 kit 0    Cholecalciferol (VITAMIN D) 50 MCG (2000 UT) tablet Take 2,000 Units by mouth daily      citalopram (CeleXA) 20 mg tablet TAKE ONE TABLET BY MOUTH EVERY DAY 30 tablet 3    Farxiga 5 MG TABS TAKE ONE TABLET BY MOUTH EVERY DAY 30 tablet 2    ferrous sulfate 324 (65 Fe) mg Take 1 tablet (324 mg total) by mouth daily 30 tablet 5    fluticasone (FLONASE) 50 mcg/act nasal spray 2 sprays into each nostril daily (Patient taking differently: 2 sprays into each nostril as needed ) 16 g 3    Lancets (OneTouch Delica Plus [id number]) MISC TEST BLOOD GLUCOSE ONCE DAILY 100 each 0    magnesium oxide (MAG-OX) 400 mg Take 1 tablet (400 mg total) by mouth 2 (two) times a day 180 tablet 2    metoprolol tartrate (LOPRESSOR) 50 mg tablet Take 1 tablet (50 mg total) by mouth 2 (two) times a day 180 tablet 3    Multiple Vitamins-Minerals (OCUVITE-LUTEIN PO) Take 1 tablet by mouth 2 (two) times a day Pt is taking preservision       nystatin (MYCOSTATIN) powder Apply topically 3 (three) times a day (Patient taking differently: Apply topically as needed ) 30 g 3    omeprazole (PriLOSEC) 40 MG capsule Take 1 capsule (40 mg total) by mouth daily 90 capsule 3    OneTouch Verio test strip TEST ONCE A  each 0    sodium bicarbonate 650 mg tablet Take 2 tablets (1,300 mg total) by mouth 2 (two) times a day 360 tablet 3    vitamin E, tocopherol, 400 units capsule Take 400 Units by mouth daily      acetaminophen (TYLENOL) 500 mg tablet Take 1,000 mg by mouth every 6 (six) hours as needed for mild pain or moderate pain       aspirin 81 mg chewable tablet Chew 81 mg daily       metFORMIN (GLUCOPHAGE-XR) 500 mg 24 hr tablet TAKE TWO TABLETS BY MOUTH TWICE A DAY WITH MEALS (Patient not taking: Reported on 2/8/2022) 360 tablet 0    sodium chloride, PF, 0 9 % Infuse 10 mL into a venous catheter daily Please flush each drain with 10cc each daily (Patient not taking: Reported on 11/24/2021 ) 60 Syringe 0    traMADol (ULTRAM) 50 mg tablet TAKE 1 TABLET BY MOUTH EVERY 6 HOURS AS NEEDED FOR MODERATE PAIN 20 tablet 0     No current facility-administered medications for this visit       Current Outpatient Medications on File Prior to Visit   Medication Sig    atorvastatin (LIPITOR) 40 mg tablet TAKE ONE TABLET BY MOUTH AT BEDTIME    Blood Glucose Monitoring Suppl (OneTouch Verio Flex System) w/Device KIT USE TO TEST BLOOD GLUCOSE DAILY    Cholecalciferol (VITAMIN D) 50 MCG (2000 UT) tablet Take 2,000 Units by mouth daily    citalopram (CeleXA) 20 mg tablet TAKE ONE TABLET BY MOUTH EVERY DAY    Farxiga 5 MG TABS TAKE ONE TABLET BY MOUTH EVERY DAY    ferrous sulfate 324 (65 Fe) mg Take 1 tablet (324 mg total) by mouth daily    fluticasone (FLONASE) 50 mcg/act nasal spray 2 sprays into each nostril daily (Patient taking differently: 2 sprays into each nostril as needed )    Lancets (OneTouch Delica Plus [id number]) MISC TEST BLOOD GLUCOSE ONCE DAILY    magnesium oxide (MAG-OX) 400 mg Take 1 tablet (400 mg total) by mouth 2 (two) times a day    metoprolol tartrate (LOPRESSOR) 50 mg tablet Take 1 tablet (50 mg total) by mouth 2 (two) times a day    Multiple Vitamins-Minerals (OCUVITE-LUTEIN PO) Take 1 tablet by mouth 2 (two) times a day Pt is taking preservision     nystatin (MYCOSTATIN) powder Apply topically 3 (three) times a day (Patient taking differently: Apply topically as needed )    omeprazole (PriLOSEC) 40 MG capsule Take 1 capsule (40 mg total) by mouth daily    OneTouch Verio test strip TEST ONCE A DAY    sodium bicarbonate 650 mg tablet Take 2 tablets (1,300 mg total) by mouth 2 (two) times a day    vitamin E, tocopherol, 400 units capsule Take 400 Units by mouth daily    acetaminophen (TYLENOL) 500 mg tablet Take 1,000 mg by mouth every 6 (six) hours as needed for mild pain or moderate pain     aspirin 81 mg chewable tablet Chew 81 mg daily     metFORMIN (GLUCOPHAGE-XR) 500 mg 24 hr tablet TAKE TWO TABLETS BY MOUTH TWICE A DAY WITH MEALS (Patient not taking: Reported on 2/8/2022)    sodium chloride, PF, 0 9 % Infuse 10 mL into a venous catheter daily Please flush each drain with 10cc each daily (Patient not taking: Reported on 11/24/2021 )    traMADol (ULTRAM) 50 mg tablet TAKE 1 TABLET BY MOUTH EVERY 6 HOURS AS NEEDED FOR MODERATE PAIN     No current facility-administered medications on file prior to visit  He is allergic to fentanyl and morphine and related       Review of Systems   Constitutional: Negative  HENT: Negative  Eyes: Negative  Respiratory: Negative  Cardiovascular: Negative  Gastrointestinal: Negative  Endocrine: Negative  Genitourinary: Negative  Musculoskeletal: Negative  Skin: Negative  Allergic/Immunologic: Negative  Neurological: Negative  Newly diagnosed footdrop on the left   Hematological: Negative  Psychiatric/Behavioral: Negative            Objective:      /84 (BP Location: Left arm, Patient Position: Sitting, Cuff Size: Adult)   Pulse 55   Temp 98 °F (36 7 °C) (Tympanic)   Resp 18   Ht 5' 6" (1 676 m)   Wt 83 kg (183 lb)   SpO2 94%   BMI 29 54 kg/m²          Physical Exam  Vitals and nursing note reviewed  Constitutional:       General: He is not in acute distress  Appearance: Normal appearance  He is not ill-appearing, toxic-appearing or diaphoretic  Comments: Pleasant 27-year-old male who is awake alert no acute distress and oriented x3   HENT:      Head: Normocephalic and atraumatic  Right Ear: Tympanic membrane, ear canal and external ear normal  There is no impacted cerumen  Left Ear: Tympanic membrane, ear canal and external ear normal  There is no impacted cerumen  Nose: Nose normal  No congestion or rhinorrhea  Mouth/Throat:      Mouth: Mucous membranes are moist       Pharynx: Oropharynx is clear  No oropharyngeal exudate or posterior oropharyngeal erythema  Eyes:      General: No scleral icterus  Right eye: No discharge  Left eye: No discharge  Extraocular Movements: Extraocular movements intact  Conjunctiva/sclera: Conjunctivae normal       Pupils: Pupils are equal, round, and reactive to light  Neck:      Vascular: No carotid bruit  Cardiovascular:      Rate and Rhythm: Normal rate and regular rhythm  Pulses: Normal pulses  Heart sounds: Normal heart sounds  No friction rub  No gallop  Pulmonary:      Effort: Pulmonary effort is normal  No respiratory distress  Breath sounds: Normal breath sounds  No stridor  No wheezing, rhonchi or rales  Chest:      Chest wall: No tenderness  Abdominal:      General: Bowel sounds are normal  There is no distension  Palpations: Abdomen is soft  There is no mass  Tenderness: There is no abdominal tenderness  There is no right CVA tenderness, left CVA tenderness, guarding or rebound  Hernia: No hernia is present     Musculoskeletal:         General: No swelling, tenderness, deformity or signs of injury  Normal range of motion  Cervical back: Normal range of motion and neck supple  No rigidity or tenderness  Right lower leg: No edema  Left lower leg: No edema  Lymphadenopathy:      Cervical: No cervical adenopathy  Skin:     General: Skin is warm and dry  Coloration: Skin is not jaundiced or pale  Findings: No bruising, erythema, lesion or rash  Neurological:      Mental Status: He is alert and oriented to person, place, and time  Mental status is at baseline  Cranial Nerves: No cranial nerve deficit  Sensory: Sensory deficit present  Motor: Weakness (The footdrop left) present  Coordination: Coordination normal       Gait: Gait normal       Deep Tendon Reflexes: Reflexes abnormal ( absent patella and Achilles tendon reflexes bilaterally, history of peripheral neuropathy)  Psychiatric:         Mood and Affect: Mood normal          Behavior: Behavior normal          Thought Content:  Thought content normal          Judgment: Judgment normal

## 2022-03-28 NOTE — ASSESSMENT & PLAN NOTE
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  Status resection of his performance ileal diversion  Patient has been following urologist here and had recent surveillance, ureteral scope finding abnormalities that may show recurrence of his cancer  Patient is going for procedure for biopsy and if recurrence of cancer to this area will be going back to [place] for re-evaluation  Patient is here today for preop evaluation  States he is doing relatively well  Parameters have been stable  Patient did have pre-admission testing performed including blood sugar testing with no abnormalities the preclude him undergoing procedures planned  He was told from a diabetes standpoint not to take any medication the day of the procedure    Patient did undergo physical exam today and is cleared for surgery as planned

## 2022-03-28 NOTE — ASSESSMENT & PLAN NOTE
Did have a CBC performed prior to the visit  His hemoglobin is stable  He continues to take the iron supplements as directed previously  We will check fasting blood sugar hemoglobin A1c and CBC with his next visit

## 2022-03-28 NOTE — ASSESSMENT & PLAN NOTE
Patient no longer is on metformin  Recent sugar shows adequate control at hemoglobin A1c 6 2  Patient admits that he is not always perfect with his diet  We will continue present medications surveillance check a fasting blood sugar hemoglobin A1c with his next visit    Lab Results   Component Value Date    HGBA1C 6 2 (H) 03/15/2022

## 2022-03-31 NOTE — PRE-PROCEDURE INSTRUCTIONS
Pre-Surgery Instructions:   Medication Instructions    acetaminophen (TYLENOL) 500 mg tablet Instructed patient per Anesthesia Guidelines  prn,may use    atorvastatin (LIPITOR) 40 mg tablet Instructed patient per Anesthesia Guidelines  takes hs    Cholecalciferol (VITAMIN D) 50 MCG (2000 UT) tablet Instructed patient per Anesthesia Guidelines  hold am of sx    citalopram (CeleXA) 20 mg tablet Instructed patient per Anesthesia Guidelines  takes in pm    Farxiga 5 MG TABS Instructed patient per Anesthesia Guidelines  hold am of sx    ferrous sulfate 324 (65 Fe) mg Instructed patient per Anesthesia Guidelines  hold am of s    fluticasone (FLONASE) 50 mcg/act nasal spray Instructed patient per Anesthesia Guidelines  prn,may use    magnesium oxide (MAG-OX) 400 mg Instructed patient per Anesthesia Guidelines  will take post op    metFORMIN (GLUCOPHAGE-XR) 500 mg 24 hr tablet Does not take    metoprolol tartrate (LOPRESSOR) 50 mg tablet Instructed patient per Anesthesia Guidelines  take am of sx    nystatin (MYCOSTATIN) powder Instructed patient per Anesthesia Guidelines  NA    omeprazole (PriLOSEC) 40 MG capsule Instructed patient per Anesthesia Guidelines  take am of sx    sodium bicarbonate 650 mg tablet Instructed patient per Anesthesia Guidelines  will take post op    traMADol (ULTRAM) 50 mg tablet Instructed patient per Anesthesia Guidelines  prn    You will receive a phone call from hospital for arrival time  Please call surgeons office if any changes in your condition  Wear easy on/off clothing; consider type of surgery;  Valuables, jewelry, piercing's please keep at home  **COVID-19  education/surgical guidelines  Updated covid    Visitation policy  Please: No contact lenses or eye make up, artificial eyelashes    Please secure transportation     Follow pre surgery showering or cleaning instructions as  Reviewed by nurse or surgeons office      Questions answered and concerns addressed

## 2022-04-01 ENCOUNTER — ANESTHESIA EVENT (OUTPATIENT)
Dept: PERIOP | Facility: HOSPITAL | Age: 82
End: 2022-04-01
Payer: MEDICARE

## 2022-04-04 NOTE — ANESTHESIA PREPROCEDURE EVALUATION
Procedure:  CYSTOSCOPY WITH BIOPSIES, biopsy of urethra (N/A Bladder)    Relevant Problems   CARDIO   (+) Benign essential hypertension   (+) Coronary artery disease involving native coronary artery of native heart without angina pectoris   (+) Hx of CABG   (+) Hyperlipidemia   (+) RBBB      ENDO   (+) DMII (diabetes mellitus, type 2) ([facility])   (+) 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)      /RENAL   (+) Acute kidney injury (HCC)   (+) Chronic kidney disease-mineral and bone disorder   (+) Stage 4 chronic kidney disease (HCC)      HEMATOLOGY   (+) Anemia   (+) Platelets decreased (HCC)      MUSCULOSKELETAL   (+) Backache      NEURO/PSYCH   (+) Anxiety and depression   (+) History of bladder cancer        Physical Exam    Airway    Mallampati score: II  TM Distance: >3 FB  Neck ROM: full     Dental       Cardiovascular  Cardiovascular exam normal    Pulmonary  Pulmonary exam normal     Other Findings        Anesthesia Plan  ASA Score- 3     Anesthesia Type- general with ASA Monitors  Additional Monitors:   Airway Plan: LMA  Plan Factors-    Chart reviewed  Patient summary reviewed  Induction- intravenous  Postoperative Plan-     Informed Consent- Anesthetic plan and risks discussed with patient  I personally reviewed this patient with the CRNA  Discussed and agreed on the Anesthesia Plan with the CRNA                Multiple cysto with LMA    Transthoracic Echocardiogram  2D, M-mode, Doppler, and Color Doppler     Study date:  2019     Patient: Nu Correa  MR number: [id number]  Account number: [de-identified]  : 1940  Age: 66 years  Gender: Male  Status: Outpatient  Location: [address] [place] [address] [facility]  Height: 68 in  Weight: 195 lb  BP: 130/ 80 mmHg     Indications: Coronary artery disease     Diagnoses: I25 10 - Atherosclerotic heart disease of native coronary artery without angina pectoris     Sonographer:  ITZEL Nichole  Primary Physician:  Tootie Muñoz DO  Referring Physician:  Dana Ivan MD  Group:  [facility]  Interpreting Physician:  Crow Gross MD     SUMMARY     LEFT VENTRICLE:  Systolic function was mildly reduced  Ejection fraction was estimated to be 45 %  There was akinesis of the basal-mid inferior wall(s)  Wall thickness was mildly increased      LEFT ATRIUM:  The atrium was mildly dilated      MITRAL VALVE:  There was mild annular calcification    There was mild regurgitation      TRICUSPID VALVE:  There was trace regurgitation      PULMONIC VALVE:  There was trace regurgitation      HISTORY: PRIOR HISTORY: Hypertension, hyperlipidemia, coronary artery disease, coronary bypass, former smoker, type 2 diabetes, GERD, acute kidney injury

## 2022-04-05 ENCOUNTER — HOSPITAL ENCOUNTER (OUTPATIENT)
Facility: HOSPITAL | Age: 82
Setting detail: OUTPATIENT SURGERY
Discharge: HOME | End: 2022-04-05
Attending: UROLOGY | Admitting: UROLOGY
Payer: MEDICARE

## 2022-04-05 ENCOUNTER — ANESTHESIA (OUTPATIENT)
Dept: PERIOP | Facility: HOSPITAL | Age: 82
End: 2022-04-05
Payer: MEDICARE

## 2022-04-05 ENCOUNTER — TELEPHONE (OUTPATIENT)
Dept: UROLOGY | Facility: AMBULATORY SURGERY CENTER | Age: 82
End: 2022-04-05

## 2022-04-05 VITALS
HEIGHT: 66 IN | BODY MASS INDEX: 29.41 KG/M2 | OXYGEN SATURATION: 99 % | RESPIRATION RATE: 16 BRPM | WEIGHT: 182.98 LBS | TEMPERATURE: 97.1 F | HEART RATE: 62 BPM | SYSTOLIC BLOOD PRESSURE: 190 MMHG | DIASTOLIC BLOOD PRESSURE: 75 MMHG

## 2022-04-05 DIAGNOSIS — C68.0 URETHRAL CARCINOMA (HCC): ICD-10-CM

## 2022-04-05 DIAGNOSIS — D49.59 URETHRAL TUMOR: Primary | ICD-10-CM

## 2022-04-05 LAB
GLUCOSE SERPL-MCNC: 114 MG/DL (ref 65–140)
GLUCOSE SERPL-MCNC: 134 MG/DL (ref 65–140)

## 2022-04-05 PROCEDURE — 52204 CYSTOSCOPY W/BIOPSY(S): CPT | Performed by: UROLOGY

## 2022-04-05 PROCEDURE — 88305 TISSUE EXAM BY PATHOLOGIST: CPT | Performed by: PATHOLOGY

## 2022-04-05 PROCEDURE — NC001 PR NO CHARGE: Performed by: UROLOGY

## 2022-04-05 PROCEDURE — 82948 REAGENT STRIP/BLOOD GLUCOSE: CPT

## 2022-04-05 RX ORDER — FENTANYL CITRATE 50 UG/ML
INJECTION, SOLUTION INTRAMUSCULAR; INTRAVENOUS AS NEEDED
Status: DISCONTINUED | OUTPATIENT
Start: 2022-04-05 | End: 2022-04-05

## 2022-04-05 RX ORDER — HYDROCODONE BITARTRATE AND ACETAMINOPHEN 5; 325 MG/1; MG/1
1 TABLET ORAL EVERY 6 HOURS PRN
Qty: 4 TABLET | Refills: 0 | Status: SHIPPED | OUTPATIENT
Start: 2022-04-05 | End: 2022-04-15

## 2022-04-05 RX ORDER — ONDANSETRON 2 MG/ML
4 INJECTION INTRAMUSCULAR; INTRAVENOUS ONCE AS NEEDED
Status: DISCONTINUED | OUTPATIENT
Start: 2022-04-05 | End: 2022-04-05 | Stop reason: HOSPADM

## 2022-04-05 RX ORDER — HYDROCODONE BITARTRATE AND ACETAMINOPHEN 5; 325 MG/1; MG/1
1 TABLET ORAL EVERY 4 HOURS PRN
Status: DISCONTINUED | OUTPATIENT
Start: 2022-04-05 | End: 2022-04-05 | Stop reason: HOSPADM

## 2022-04-05 RX ORDER — HYDRALAZINE HYDROCHLORIDE 20 MG/ML
10 INJECTION INTRAMUSCULAR; INTRAVENOUS
Status: DISCONTINUED | OUTPATIENT
Start: 2022-04-05 | End: 2022-04-05 | Stop reason: HOSPADM

## 2022-04-05 RX ORDER — LIDOCAINE HYDROCHLORIDE 10 MG/ML
INJECTION, SOLUTION EPIDURAL; INFILTRATION; INTRACAUDAL; PERINEURAL AS NEEDED
Status: DISCONTINUED | OUTPATIENT
Start: 2022-04-05 | End: 2022-04-05

## 2022-04-05 RX ORDER — ONDANSETRON 2 MG/ML
INJECTION INTRAMUSCULAR; INTRAVENOUS AS NEEDED
Status: DISCONTINUED | OUTPATIENT
Start: 2022-04-05 | End: 2022-04-05

## 2022-04-05 RX ORDER — HYDRALAZINE HYDROCHLORIDE 20 MG/ML
INJECTION INTRAMUSCULAR; INTRAVENOUS AS NEEDED
Status: DISCONTINUED | OUTPATIENT
Start: 2022-04-05 | End: 2022-04-05

## 2022-04-05 RX ORDER — GLYCOPYRROLATE 0.2 MG/ML
INJECTION INTRAMUSCULAR; INTRAVENOUS AS NEEDED
Status: DISCONTINUED | OUTPATIENT
Start: 2022-04-05 | End: 2022-04-05

## 2022-04-05 RX ORDER — DEXAMETHASONE SODIUM PHOSPHATE 10 MG/ML
INJECTION, SOLUTION INTRAMUSCULAR; INTRAVENOUS AS NEEDED
Status: DISCONTINUED | OUTPATIENT
Start: 2022-04-05 | End: 2022-04-05

## 2022-04-05 RX ORDER — PROPOFOL 10 MG/ML
INJECTION, EMULSION INTRAVENOUS AS NEEDED
Status: DISCONTINUED | OUTPATIENT
Start: 2022-04-05 | End: 2022-04-05

## 2022-04-05 RX ORDER — SODIUM CHLORIDE, SODIUM LACTATE, POTASSIUM CHLORIDE, CALCIUM CHLORIDE 600; 310; 30; 20 MG/100ML; MG/100ML; MG/100ML; MG/100ML
125 INJECTION, SOLUTION INTRAVENOUS CONTINUOUS
Status: DISCONTINUED | OUTPATIENT
Start: 2022-04-05 | End: 2022-04-05 | Stop reason: HOSPADM

## 2022-04-05 RX ORDER — SODIUM CHLORIDE 9 MG/ML
125 INJECTION, SOLUTION INTRAVENOUS CONTINUOUS
Status: DISCONTINUED | OUTPATIENT
Start: 2022-04-05 | End: 2022-04-05 | Stop reason: HOSPADM

## 2022-04-05 RX ORDER — CEFAZOLIN SODIUM 2 G/50ML
2000 SOLUTION INTRAVENOUS ONCE
Status: COMPLETED | OUTPATIENT
Start: 2022-04-05 | End: 2022-04-05

## 2022-04-05 RX ORDER — EPHEDRINE SULFATE 50 MG/ML
INJECTION INTRAVENOUS AS NEEDED
Status: DISCONTINUED | OUTPATIENT
Start: 2022-04-05 | End: 2022-04-05

## 2022-04-05 RX ORDER — METOPROLOL TARTRATE 5 MG/5ML
INJECTION INTRAVENOUS AS NEEDED
Status: DISCONTINUED | OUTPATIENT
Start: 2022-04-05 | End: 2022-04-05

## 2022-04-05 RX ORDER — ALBUTEROL SULFATE 2.5 MG/3ML
2.5 SOLUTION RESPIRATORY (INHALATION) ONCE AS NEEDED
Status: DISCONTINUED | OUTPATIENT
Start: 2022-04-05 | End: 2022-04-05 | Stop reason: HOSPADM

## 2022-04-05 RX ORDER — FENTANYL CITRATE/PF 50 MCG/ML
12.5 SYRINGE (ML) INJECTION
Status: DISCONTINUED | OUTPATIENT
Start: 2022-04-05 | End: 2022-04-05 | Stop reason: HOSPADM

## 2022-04-05 RX ORDER — MAGNESIUM HYDROXIDE 1200 MG/15ML
LIQUID ORAL AS NEEDED
Status: DISCONTINUED | OUTPATIENT
Start: 2022-04-05 | End: 2022-04-05 | Stop reason: HOSPADM

## 2022-04-05 RX ADMIN — PROPOFOL 30 MG: 10 INJECTION, EMULSION INTRAVENOUS at 08:48

## 2022-04-05 RX ADMIN — SODIUM CHLORIDE, SODIUM LACTATE, POTASSIUM CHLORIDE, AND CALCIUM CHLORIDE: .6; .31; .03; .02 INJECTION, SOLUTION INTRAVENOUS at 08:12

## 2022-04-05 RX ADMIN — PROPOFOL 30 MG: 10 INJECTION, EMULSION INTRAVENOUS at 08:45

## 2022-04-05 RX ADMIN — HYDRALAZINE HYDROCHLORIDE 10 MG: 20 INJECTION INTRAMUSCULAR; INTRAVENOUS at 10:49

## 2022-04-05 RX ADMIN — FENTANYL CITRATE 50 MCG: 50 INJECTION INTRAMUSCULAR; INTRAVENOUS at 08:40

## 2022-04-05 RX ADMIN — METOPROLOL TARTRATE 1 MG: 1 INJECTION, SOLUTION INTRAVENOUS at 09:06

## 2022-04-05 RX ADMIN — PROPOFOL 50 MG: 10 INJECTION, EMULSION INTRAVENOUS at 08:40

## 2022-04-05 RX ADMIN — EPHEDRINE SULFATE 10 MG: 50 INJECTION INTRAVENOUS at 08:43

## 2022-04-05 RX ADMIN — ONDANSETRON 4 MG: 2 INJECTION INTRAMUSCULAR; INTRAVENOUS at 08:57

## 2022-04-05 RX ADMIN — HYDRALAZINE HYDROCHLORIDE 10 MG: 20 INJECTION INTRAMUSCULAR; INTRAVENOUS at 09:15

## 2022-04-05 RX ADMIN — CEFAZOLIN SODIUM 2000 MG: 2 SOLUTION INTRAVENOUS at 08:25

## 2022-04-05 RX ADMIN — HYDRALAZINE HYDROCHLORIDE 10 MG: 20 INJECTION INTRAMUSCULAR; INTRAVENOUS at 10:23

## 2022-04-05 RX ADMIN — LIDOCAINE HYDROCHLORIDE 50 MG: 10 INJECTION, SOLUTION EPIDURAL; INFILTRATION; INTRACAUDAL; PERINEURAL at 08:40

## 2022-04-05 RX ADMIN — PROPOFOL 150 MG: 10 INJECTION, EMULSION INTRAVENOUS at 08:37

## 2022-04-05 RX ADMIN — METOPROLOL TARTRATE 1.5 MG: 1 INJECTION, SOLUTION INTRAVENOUS at 09:08

## 2022-04-05 RX ADMIN — DEXAMETHASONE SODIUM PHOSPHATE 8 MG: 10 INJECTION, SOLUTION INTRAMUSCULAR; INTRAVENOUS at 08:42

## 2022-04-05 RX ADMIN — GLYCOPYRROLATE 0.3 MG: 0.2 INJECTION, SOLUTION INTRAMUSCULAR; INTRAVENOUS at 08:40

## 2022-04-05 NOTE — H&P
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  He previously underwent radical cystoprostatectomy with ileal conduit creation  He states that his extra if surgery was performed in 2020 at the [address] [place] [address] by Dr Fernando Whalen      I recently performed office urethroscopy to assess his urethra  Throughout the urethra there appeared to be carpeting of superficial appearing urothelial carcinoma  BP (!) 188/96   Pulse (!) 49   Temp (!) 97 1 °F (36 2 °C) (Tympanic)   Resp 16   Ht 5' 6" (1 676 m)   Wt 83 kg (182 lb 15 7 oz)   SpO2 98%   BMI 29 53 kg/m²     On examination he is in no acute distress  Cardiac is regular  He has no respiratory distress  Abdomen is soft nontender nondistended  Ileal conduit in place      Impression:  History of high-grade muscle invasive bladder cancer, chronic kidney disease, concern for urethral recurrence      Plan:  I recommend urethroscopy in the operating room with biopsies and fulguration of the urethral lesions  If this is urethral urothelial carcinoma I would recommend a referral back to Dr Fernando Whalen for completion urethrectomy  Risk and benefits of the procedure were discussed and reviewed at length informed consent was obtained

## 2022-04-05 NOTE — OP NOTE
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]    Procedure(s) (LRB):  urethroscopy , biopsy of urethra with fulgeration (N/A)    Specimen(s):  ID Type Source Tests Collected by Time Destination   1 : urethral bx Tissue Urethra TISSUE EXAM Claudia Munoz MD 2022 [id number]        Estimated Blood Loss:   Minimal    Drains:  Urostomy (Active)   Number of days:        Anesthesia Type:   General    Operative Indications:  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]      Operative Findings:  Superficial appearing tumor throughout the bulbar and pendulous urethra  Multiple areas biopsied and all tumor fulgurated  Complications:   None    Procedure and Technique:  Luis A Lawrence is an 80-year-old male with a history of carcinoma in-situ of the bladder  He is status post radical cystectomy with ileal conduit creation performed at the [address] [place] [address] by Dr Anitha Allen in 2020  He recently transferred care to my practice  I performed urethroscopy recently and noticed a significant amount of superficial appearing tumor within his urethra  I recommended urethroscopy with biopsy and fulguration  Risk and benefits of the procedure discussed reviewed informed consent obtained  Patient brought to the operating room on 2022  After the smooth induction of general LMA anesthesia, patient placed in dorsal lithotomy position  His genitalia was prepped and draped in sterile fashion  Intravenous antibiotics were administered  Time-out was performed with all members the operative team confirming the patient's identity procedure to be performed  Twenty-two Japanese rigid cystoscope with 30 degree lens was passed through the urethra    The urethra was inspected from the meatus all the way to the level of the external sphincter complex  The urethra was blind ending at this point  Multiple superficial appearing papillary tumors were identified  Multiple tumors were biopsied with cold cup biopsy forceps  Bugbee electrocautery was then used to fulgurate all biopsy sites as well as any frondular appearing tumor remaining  There was bleeding from the fossa navicularis  This was controlled with a combination of electrocautery as well as applying pressure  Ultimately hemostasis was acceptable  Arlyn roll and Coban were applied  Overall patient tolerated the procedure well number no complications  The patient was extubated in the operating room transferred the PACU in stable condition at the conclusion of the case      Patient Disposition:  PACU stable and extubated      SIGNATURE: En Wade MD  DATE: April 5, 2022  TIME: 9:32 AM

## 2022-04-05 NOTE — TELEPHONE ENCOUNTER
----- Message from Cedrick Barragan MD sent at 4/5/2022  9:46 AM EDT -----  S/p urethral biopsy  Needs f/u with me in 2-3 weeks for path    FT

## 2022-04-05 NOTE — DISCHARGE INSTRUCTIONS
Flavia Acosta,    Today removed superficial appearing tumors from your urethra  You have a dressing on the penis which can be removed on the morning of April 6, 2022  You may then take a shower  Call for follow-up with Dr Mayela Abbott to review pathology in approximately 2 weeks  [phone]  Dr Maci Rollins:   A cystoscopy is a procedure to look inside your urethra and bladder using a cystoscope  The procedure is used to diagnose and treat conditions of the bladder, urethra, or prostate  DISCHARGE INSTRUCTIONS:   Seek care immediately if:   · Your urine turns from pink to red, or you have clots in your urine  · You cannot urinate and your bladder feels full  · You have severe pain  Contact your healthcare provider or urologist if:   · Your pain or burning during urination becomes worse or lasts longer than 1 day  · Your urine stays pink for longer than 1 day  · You have a fever and chills  · You urinate less than usual, or still feel like you have to urinate after you use the bathroom  · You have questions or concerns about your condition or care  Medicines: You may  be given any of the following:    · Acetaminophen  decreases pain and fever  It is available without a doctor's order  Ask how much to take and how often to take it  Follow directions  Read the labels of all other medicines you are using to see if they also contain acetaminophen, or ask your doctor or pharmacist  Acetaminophen can cause liver damage if not taken correctly  Do not use more than 4 grams (4,000 milligrams) total of acetaminophen in one day  · Take your medicine as directed  Contact your healthcare provider if you think your medicine is not helping or if you have side effects  Tell him or her if you are allergic to any medicine  Keep a list of the medicines, vitamins, and herbs you take  Include the amounts, and when and why you take them   Bring the list or the pill bottles to follow-up visits  Carry your medicine list with you in case of an emergency  Follow up with your healthcare provider as directed:  Write down your questions so you remember to ask them during your visits  Self-care:   · Drink liquids as directed  Your healthcare provider may recommend that you drink 6 to 8 eight-ounce cups of water every day for 2 days after your procedure  · Ask when you can return to regular daily activities  Your healthcare provider may recommend that you rest after your procedure  · Do not have sex  until your healthcare provider tells you it is okay  Sex may increase your risk for a urinary tract infection  © Copyright [employer] 2022 Information is for End User's use only and may not be sold, redistributed or otherwise used for commercial purposes  All illustrations and images included in CareNotes® are the copyrighted property of [employer]  or Felipa Lowe   The above information is an  only  It is not intended as medical advice for individual conditions or treatments  Talk to your doctor, nurse or pharmacist before following any medical regimen to see if it is safe and effective for you

## 2022-04-05 NOTE — ANESTHESIA POSTPROCEDURE EVALUATION
Post-Op Assessment Note    CV Status:  Stable  Pain Score: 0    Pain management: adequate     Mental Status:  Sleepy   Hydration Status:  Euvolemic   PONV Controlled:  Controlled   Airway Patency:  Patent      Post Op Vitals Reviewed: Yes      Staff: CRNA, Anesthesiologist         No complications documented      /68 (04/05/22 0936)    Temp 97 6 °F (36 4 °C) (04/05/22 0936)    Pulse 74 (04/05/22 0936)   Resp 18 (04/05/22 0936)    SpO2 99 % (04/05/22 0936)

## 2022-04-06 NOTE — TELEPHONE ENCOUNTER
Post Op Note    Santos Aden is a 80 y o  male s/p urethroscopy , biopsy of urethra with fulgeration (N/A) performed 4/5/2022  Santos Aden is a patient of Dr Umm Harris and is seen at the [place] office  Called and LM for patient that we were just seeing how he is doing after his procedure yesterday  Advised to contact the office with any issues  Reminded of appointment on 4/22 at 3:30

## 2022-04-07 NOTE — TELEPHONE ENCOUNTER
Post Op Note    Jessica Rivers is a 80 y o  male s/p urethroscopy , biopsy of urethra with fulgeration (N/A) performed 4/5/2022  Jessica Rivers is a patient of Dr Bethany Lawson and is seen at the [place] office  How would you rate your pain on a scale from 1 to 10, 10 being the worst pain ever? 0  Have you had a fever? No  Have your bowel movements been regular? No, taking colace  Do you have any difficulty urinating? No  Do you have any other questions or concerns that I can address at this time? Patient would like more education on urostomies  Advised that I would reach out to the nurse navigator  Patient is doing very well overall  Reminded of follow up appointment 4/22 at 3:30

## 2022-04-22 ENCOUNTER — OFFICE VISIT (OUTPATIENT)
Dept: UROLOGY | Facility: AMBULATORY SURGERY CENTER | Age: 82
End: 2022-04-22
Payer: MEDICARE

## 2022-04-22 VITALS
HEART RATE: 64 BPM | OXYGEN SATURATION: 97 % | DIASTOLIC BLOOD PRESSURE: 90 MMHG | WEIGHT: 183 LBS | BODY MASS INDEX: 29.41 KG/M2 | HEIGHT: 66 IN | SYSTOLIC BLOOD PRESSURE: 144 MMHG

## 2022-04-22 DIAGNOSIS — C68.0 URETHRAL CANCER (HCC): Primary | ICD-10-CM

## 2022-04-22 PROCEDURE — 99214 OFFICE O/P EST MOD 30 MIN: CPT | Performed by: UROLOGY

## 2022-04-22 NOTE — PROGRESS NOTES
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 with ileal conduit diversion, high-grade urothelial carcinoma recurrence, chronic kidney disease      Discussion  I had a discussion with Carmela Starkey in the office today regarding his urethral biopsy which reveals high-grade superficial urothelial carcinoma  I recommend that he return to see Dr Aline Luis at the [address] [place] [address] for a total urethrectomy  The patient is unsure that he would like to proceed with this invasive operation  I explained to him that without treatment I am concern for disease progression, local invasion, or even metastasis without treatment  The patient states that he will consider contacting the [address] [place] [address] and Dr Aline Luis early next week  I recommended short interval follow-up  he is not a candidate for CT scan or MRI with contrast secondary to his creatinine of 2 3  History of Present Illness  80 y o  male with a history of BCG refractory urothelial carcinoma the bladder status post radical cystoprostatectomy with ileal conduit creation performed in 2020 at the [address] [place] [address] by Dr Aline Luis  As part of routine surveillance I recently performed urethroscopy  The patient was found to have carpeting of tumor within the urethra  Cesar Black he was taken to the operating room where I performed cystoscopy with urethral biopsy and fulguration of his urethra  He continues to have intermittent blood per meatus  He returns in follow-up today  Pathology reveals high-grade superficial urothelial carcinoma the urethra  I had a lengthy discussion with the patient regarding this finding  Although I performed extensive fulguration of his nonfunctional urethra I have recommended a referral back to the [address] [place] [address] to discuss completion urethrectomy      AUA Symptom Score  AUA SYMPTOM SCORE Most Recent Value   AUA SYMPTOM SCORE    How often have you had a sensation of not emptying your bladder completely after you finished urinating? 0 (P)     How often have you had to urinate again less than two hours after you finished urinating? 0 (P)     How often have you found you stopped and started again several times when you urinate? 0 (P)     How often have you found it difficult to postpone urination? 0 (P)     How often have you had a weak urinary stream? 0 (P)     How often have you had to push or strain to begin urination? 0 (P)     How many times did you most typically get up to urinate from the time you went to bed at night until the time you got up in the morning? 0 (P)     Quality of Life: If you were to spend the rest of your life with your urinary condition just the way it is now, how would you feel about that? 2 (P)     AUA SYMPTOM SCORE 0 (P)           Review of Systems  Review of Systems   Constitutional: Negative  HENT: Negative  Eyes: Negative  Respiratory: Negative  Cardiovascular: Negative  Gastrointestinal: Negative  Endocrine: Negative  Genitourinary:        Per HPI   Musculoskeletal: Negative  Skin: Negative  Allergic/Immunologic: Negative  Neurological: Negative  Hematological: Negative  Psychiatric/Behavioral: Negative            Past Medical History  Past Medical History:   Diagnosis Date    [facility] )     Arthritis     Hands    Wright esophagus     BPH with obstruction/lower urinary tract symptoms     CAD (coronary artery disease)     Last assessed 09/16/15    Cancer [facility])     bladder    Cataract, acquired     Last assessed 10/10/17    Chronic kidney disease     Coronary artery disease     Diabetes mellitus ([facility] )     NIDDM    Diabetic neuropathy ([facility] )     Feet    Enlarged prostate with lower urinary tract symptoms (LUTS)     Last assessed 10/10/17    Erectile dysfunction     GERD (gastroesophageal reflux disease) Last assessed 10/10/17    Hemoptysis     Last assessed 03/14/16    Hypercholesterolemia     Last assessed 10/10/17    Hypertension     Last assessed 10/10/17    Macular degeneration     Right eye is particularly affected-peripheral vision intact    Myocardial infarction ([facility] ) 1998    OAB (overactive bladder)     Testicular hypofunction     Testicular hypogonadism     Last assessed 10/10/17    Tinnitus     Umbilical hernia     Last assessed 06/18/14    Urge incontinence of urine     Wears glasses        Past Social History  Past Surgical History:   Procedure Laterality Date    COLONOSCOPY      CORONARY ARTERY BYPASS GRAFT  07/16/2014    ABG x 4 LIMA to LAD,SVG to diagnoal 2 SVG to OM-1, SVG to PDA, resection of partial plerual mass    CT GUIDED PERC DRAINAGE CATHETER PLACEMENT  2/19/2021    CYSTOSCOPY  2013    CYSTOSCOPY  02/08/2022    [place]    ESOPHAGOGASTRODUODENOSCOPY      FL RETROGRADE PYELOGRAM  12/20/2018    FL RETROGRADE PYELOGRAM  12/5/2019    INGUINAL HERNIA REPAIR Bilateral 2015    IR DRAINAGE TUBE CHECK AND/OR REMOVAL  3/5/2021    PHOTODYNAMIC THERAPY      For Barretts esophagus    ME COLONOSCOPY FLX DX W/COLLJ SPEC WHEN PFRMD N/A 4/25/2016    Procedure: COLONOSCOPY;  Surgeon: Lashawn Cage MD;  Location:  GI LAB; Service: Gastroenterology    ME CYSTOURETHROSCOPY,BIOPSY N/A 9/10/2020    Procedure: CYSTOSCOPY, COLLECTION OF LEFT KIDNEY CYTOLOGY, BILATERAL RETROGRADE PYELOGRAM, BLADDER WALL BIOPSIES  AND [address], RANDOM BLADDER TUMOR BIOPSIES;  Surgeon: Abdulaziz Salazar MD;  Location: [address] [place] [address];  Service: Urology    ME CYSTOURETHROSCOPY,BIOPSY N/A 4/5/2022    Procedure: urethroscopy , biopsy of urethra with fulgeration;  Surgeon: Susan Thorne MD;  Location:  MAIN OR;  Service: Urology    ME CYSTOURETHROSCOPY,FULGUR 2-5 CM LESN N/A 4/16/2020    Procedure: CYSTOSCOPY, TRANSURETHRAL RESECTION OF BLADDER TUMOR (TURBT);   Surgeon: Abdulaziz Salazar MD;  [facility];  Service: Urology    [place] CYSTOURETHROSCOPY,FULGUR <0 5 CM LESN N/A 2019    Procedure: CYSTO W/TURBT AND TRANSURETHRAL PROSTATE BIOPSY AND OPENING OF BLADDER NECK CONTRACTURE, B/L Retrograde pyelogram;  Surgeon: Lyssa Gill MD;  [facility];  Service: Urology    TONSILLECTOMY      TRANSURETHRAL RESECTION OF PROSTATE N/A 2018    Procedure: CYSTO, PHOTO SELECTIVE VAPORIZATION OF PROSTATE, B/L RETROGRADE PYELOGRAM, TURBT;  Surgeon: Lyssa Glil MD;  [facility];  Service: Urology    UMBILICAL HERNIA REPAIR      UPPER GASTROINTESTINAL ENDOSCOPY         Past Family History  Family History   Problem Relation Age of Onset    Cancer Mother     Other Mother         Digestive System Complications    Diabetes Father     Heart disease Father     Hypertension Father     Coronary artery disease Father     Hyperlipidemia Father        Past Social history  Social History     Socioeconomic History    Marital status: /Civil Union     Spouse name: Not on file    Number of children: Not on file    Years of education: Not on file    Highest education level: Not on file   Occupational History    Occupation: Sales position   Tobacco Use    Smoking status: Former Smoker     Packs/day: 1 00     Years: 13 00     Pack years: 13 00     Types: Cigarettes     Quit date:      Years since quittin 3    Smokeless tobacco: Never Used   Vaping Use    Vaping Use: Never used   Substance and Sexual Activity    Alcohol use: Not Currently     Alcohol/week: 2 0 standard drinks     Types: 1 Glasses of wine, 1 Cans of beer per week    Drug use: Never    Sexual activity: Not Currently   Other Topics Concern    Not on file   Social History Narrative    Always uses seatbelt        Caffeine use- Drinks 2 cups of coffee daily     Social Determinants of Health     Financial Resource Strain: Not on file   Food Insecurity: Not on file   Transportation Needs: Not on file Physical Activity: Not on file   Stress: Not on file   Social Connections: Not on file   Intimate Partner Violence: Not on file   Housing Stability: Not on file       Current Medications  Current Outpatient Medications   Medication Sig Dispense Refill    acetaminophen (TYLENOL) 500 mg tablet Take 1,000 mg by mouth every 6 (six) hours as needed for mild pain or moderate pain       atorvastatin (LIPITOR) 40 mg tablet TAKE ONE TABLET BY MOUTH AT BEDTIME 90 tablet 3    Blood Glucose Monitoring Suppl (OneTouch Verio Flex System) w/Device KIT USE TO TEST BLOOD GLUCOSE DAILY 1 kit 0    Cholecalciferol (VITAMIN D) 50 MCG (2000 UT) tablet Take 2,000 Units by mouth daily      citalopram (CeleXA) 20 mg tablet TAKE ONE TABLET BY MOUTH EVERY DAY 30 tablet 3    Farxiga 5 MG TABS TAKE ONE TABLET BY MOUTH EVERY DAY 30 tablet 2    ferrous sulfate 324 (65 Fe) mg Take 1 tablet (324 mg total) by mouth daily 30 tablet 5    fluticasone (FLONASE) 50 mcg/act nasal spray 2 sprays into each nostril daily (Patient taking differently: 2 sprays into each nostril as needed ) 16 g 3    Lancets (OneTouch Delica Plus [id number]) MISC TEST BLOOD GLUCOSE ONCE DAILY 100 each 0    magnesium oxide (MAG-OX) 400 mg Take 1 tablet (400 mg total) by mouth 2 (two) times a day 180 tablet 2    metoprolol tartrate (LOPRESSOR) 50 mg tablet Take 1 tablet (50 mg total) by mouth 2 (two) times a day 180 tablet 3    Multiple Vitamins-Minerals (OCUVITE-LUTEIN PO) Take 1 tablet by mouth 2 (two) times a day Pt is taking preservision       nystatin (MYCOSTATIN) powder Apply topically 3 (three) times a day (Patient taking differently: Apply topically as needed ) 30 g 3    omeprazole (PriLOSEC) 40 MG capsule Take 1 capsule (40 mg total) by mouth daily 90 capsule 3    OneTouch Verio test strip TEST ONCE A  each 0    sodium bicarbonate 650 mg tablet Take 2 tablets (1,300 mg total) by mouth 2 (two) times a day 360 tablet 3    traMADol (ULTRAM) 50 mg tablet TAKE 1 TABLET BY MOUTH EVERY 6 HOURS AS NEEDED FOR MODERATE PAIN 20 tablet 0    vitamin E, tocopherol, 400 units capsule Take 400 Units by mouth daily      metFORMIN (GLUCOPHAGE-XR) 500 mg 24 hr tablet TAKE TWO TABLETS BY MOUTH TWICE A DAY WITH MEALS (Patient not taking: Reported on 3/31/2022) 360 tablet 0     No current facility-administered medications for this visit  Allergies  Allergies   Allergen Reactions    Fentanyl Hallucinations    Morphine And Related Other (See Comments)     While having an MI patient received morphine and had adverse reaction but doesn't know what happened  Past Medical History, Social History, Family History, medications and allergies were reviewed  Vitals  Vitals:    04/22/22 1524   BP: 144/90   Pulse: 64   SpO2: 97%   Weight: 83 kg (183 lb)   Height: 5' 6" (1 676 m)       Physical Exam  Physical Exam  On examination he is in no acute distress  His abdomen is soft nontender nondistended  Ileal conduit is noted  Stoma is pink and viable  Copious clear yellow urine noted in the bag  Skin is warm  Extremities without edema  Slight bloody discharge at meatus  Skin is warm    Extremities without edema    Results  Lab Results   Component Value Date    PSA 0 4 09/19/2018    PSA 0 4 10/04/2017    PSA 0 4 10/17/2016     Lab Results   Component Value Date    GLUCOSE 128 08/13/2015    CALCIUM 9 4 03/15/2022     08/13/2015    K 4 9 03/15/2022    CO2 26 03/15/2022     (H) 03/15/2022    BUN 45 (H) 03/15/2022    CREATININE 2 34 (H) 03/15/2022     Lab Results   Component Value Date    WBC 10 18 (H) 03/15/2022    HGB 13 4 03/15/2022    HCT 40 8 03/15/2022    MCV 94 03/15/2022     (L) 03/15/2022         Office Urine Dip  No results found for this or any previous visit (from the past 1 hour(s)) ]

## 2022-04-22 NOTE — LETTER
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 with ileal conduit diversion, high-grade urothelial carcinoma recurrence, chronic kidney disease      Discussion  I had a discussion with Modebabak Cristobal in the office today regarding his urethral biopsy which reveals high-grade superficial urothelial carcinoma  I recommend that he return to see Dr Melodie Avalos at the [address] [place] [address] for a total urethrectomy  The patient is unsure that he would like to proceed with this invasive operation  I explained to him that without treatment I am concern for disease progression, local invasion, or even metastasis without treatment  The patient states that he will consider contacting the [address] [place] [address] and Dr Melodie Avalos early next week  I recommended short interval follow-up  he is not a candidate for CT scan or MRI with contrast secondary to his creatinine of 2 3  History of Present Illness  80 y o  male with a history of BCG refractory urothelial carcinoma the bladder status post radical cystoprostatectomy with ileal conduit creation performed in 2020 at the [address] [place] [address] by Dr Melodie Avalos  As part of routine surveillance I recently performed urethroscopy  The patient was found to have carpeting of tumor within the urethra  [facility] he was taken to the operating room where I performed cystoscopy with urethral biopsy and fulguration of his urethra  He continues to have intermittent blood per meatus  He returns in follow-up today  Pathology reveals high-grade superficial urothelial carcinoma the urethra  I had a lengthy discussion with the patient regarding this finding  Although I performed extensive fulguration of his nonfunctional urethra I have recommended a referral back to the [address] [place] [address] to discuss completion urethrectomy  AUA Symptom Score  AUA SYMPTOM SCORE      Most Recent Value   AUA SYMPTOM SCORE    How often have you had a sensation of not emptying your bladder completely after you finished urinating? 0 (P)     How often have you had to urinate again less than two hours after you finished urinating? 0 (P)     How often have you found you stopped and started again several times when you urinate? 0 (P)     How often have you found it difficult to postpone urination? 0 (P)     How often have you had a weak urinary stream? 0 (P)     How often have you had to push or strain to begin urination? 0 (P)     How many times did you most typically get up to urinate from the time you went to bed at night until the time you got up in the morning? 0 (P)     Quality of Life: If you were to spend the rest of your life with your urinary condition just the way it is now, how would you feel about that? 2 (P)     AUA SYMPTOM SCORE 0 (P)           Review of Systems  Review of Systems   Constitutional: Negative  HENT: Negative  Eyes: Negative  Respiratory: Negative  Cardiovascular: Negative  Gastrointestinal: Negative  Endocrine: Negative  Genitourinary:        Per HPI   Musculoskeletal: Negative  Skin: Negative  Allergic/Immunologic: Negative  Neurological: Negative  Hematological: Negative  Psychiatric/Behavioral: Negative            Past Medical History  Past Medical History:   Diagnosis Date    Acute kidney injury ([facility] )     Arthritis     Hands    Wright esophagus     BPH with obstruction/lower urinary tract symptoms     CAD (coronary artery disease)     Last assessed 09/16/15    Cancer ([facility] )     bladder    Cataract, acquired     Last assessed 10/10/17    Chronic kidney disease     Coronary artery disease     Diabetes mellitus ([facility] )     NIDDM    Diabetic neuropathy ([facility] )     Feet    Enlarged prostate with lower urinary tract symptoms (LUTS)     Last assessed 10/10/17    Erectile dysfunction     GERD (gastroesophageal reflux disease)     Last assessed 10/10/17    Hemoptysis     Last assessed 03/14/16    Hypercholesterolemia     Last assessed 10/10/17    Hypertension     Last assessed 10/10/17    Macular degeneration     Right eye is particularly affected-peripheral vision intact    Myocardial infarction ([facility] ) 1998    OAB (overactive bladder)     Testicular hypofunction     Testicular hypogonadism     Last assessed 10/10/17    Tinnitus     Umbilical hernia     Last assessed 06/18/14    Urge incontinence of urine     Wears glasses        Past Social History  Past Surgical History:   Procedure Laterality Date    COLONOSCOPY      CORONARY ARTERY BYPASS GRAFT  07/16/2014    ABG x 4 LIMA to LAD,SVG to diagnoal 2 SVG to OM-1, SVG to PDA, resection of partial plerual mass    CT GUIDED PERC DRAINAGE CATHETER PLACEMENT  2/19/2021    CYSTOSCOPY  2013    CYSTOSCOPY  02/08/2022    [place]    ESOPHAGOGASTRODUODENOSCOPY      FL RETROGRADE PYELOGRAM  12/20/2018    FL RETROGRADE PYELOGRAM  12/5/2019    INGUINAL HERNIA REPAIR Bilateral 2015    IR DRAINAGE TUBE CHECK AND/OR REMOVAL  3/5/2021    PHOTODYNAMIC THERAPY      For Barretts esophagus    MT COLONOSCOPY FLX DX W/COLLJ SPEC WHEN PFRMD N/A 4/25/2016    Procedure: COLONOSCOPY;  Surgeon: Dana Cisneros MD;  Location: [facility];   Service: Gastroenterology    MT CYSTOURETHROSCOPY,BIOPSY N/A 9/10/2020    Procedure: CYSTOSCOPY, COLLECTION OF LEFT KIDNEY CYTOLOGY, BILATERAL RETROGRADE PYELOGRAM, BLADDER WALL BIOPSIES  AND FULGERAION, RANDOM BLADDER TUMOR BIOPSIES;  Surgeon: Colette Max MD;  Location: [address] [place] [address] MAIN OR;  Service: Urology    HI CYSTOURETHROSCOPY,BIOPSY N/A 2022    Procedure: urethroscopy , biopsy of urethra with fulgeration;  Surgeon: Mika Mendez MD;  Location:  MAIN OR;  Service: Urology    HI CYSTOURETHROSCOPY,FULGUR 2-5 CM LESN N/A 2020    Procedure: CYSTOSCOPY, TRANSURETHRAL RESECTION OF BLADDER TUMOR (TURBT);   Surgeon: Colette Max MD;  [facility];  Service: Urology    HI CYSTOURETHROSCOPY,FULGUR <0 5 CM LESN N/A 2019    Procedure: CYSTO W/TURBT AND TRANSURETHRAL PROSTATE BIOPSY AND OPENING OF BLADDER NECK CONTRACTURE, B/L Retrograde pyelogram;  Surgeon: Colette Max MD;  [facility];  Service: Urology    TONSILLECTOMY      TRANSURETHRAL RESECTION OF PROSTATE N/A 2018    Procedure: CYSTO, PHOTO SELECTIVE VAPORIZATION OF PROSTATE, B/L RETROGRADE PYELOGRAM, TURBT;  Surgeon: Colette Max MD;  [facility];  Service: Urology    UMBILICAL HERNIA REPAIR  2012    UPPER GASTROINTESTINAL ENDOSCOPY         Past Family History  Family History   Problem Relation Age of Onset    Cancer Mother     Other Mother         Digestive System Complications    Diabetes Father     Heart disease Father     Hypertension Father     Coronary artery disease Father     Hyperlipidemia Father        Past Social history  Social History     Socioeconomic History    Marital status: /Civil Union     Spouse name: Not on file    Number of children: Not on file    Years of education: Not on file    Highest education level: Not on file   Occupational History    Occupation: Sales position   Tobacco Use    Smoking status: Former Smoker     Packs/day: 1 00     Years: 13 00     Pack years: 13 00     Types: Cigarettes     Quit date:      Years since quittin 3  Smokeless tobacco: Never Used   Vaping Use    Vaping Use: Never used   Substance and Sexual Activity    Alcohol use: Not Currently     Alcohol/week: 2 0 standard drinks     Types: 1 Glasses of wine, 1 Cans of beer per week    Drug use: Never    Sexual activity: Not Currently   Other Topics Concern    Not on file   Social History Narrative    Always uses seatbelt        Caffeine use- Drinks 2 cups of coffee daily     Social Determinants of Health     Financial Resource Strain: Not on file   Food Insecurity: Not on file   Transportation Needs: Not on file   Physical Activity: Not on file   Stress: Not on file   Social Connections: Not on file   Intimate Partner Violence: Not on file   Housing Stability: Not on file       Current Medications  Current Outpatient Medications   Medication Sig Dispense Refill    acetaminophen (TYLENOL) 500 mg tablet Take 1,000 mg by mouth every 6 (six) hours as needed for mild pain or moderate pain       atorvastatin (LIPITOR) 40 mg tablet TAKE ONE TABLET BY MOUTH AT BEDTIME 90 tablet 3    Blood Glucose Monitoring Suppl (OneTouch Verio Flex System) w/Device KIT USE TO TEST BLOOD GLUCOSE DAILY 1 kit 0    Cholecalciferol (VITAMIN D) 50 MCG (2000 UT) tablet Take 2,000 Units by mouth daily      citalopram (CeleXA) 20 mg tablet TAKE ONE TABLET BY MOUTH EVERY DAY 30 tablet 3    Farxiga 5 MG TABS TAKE ONE TABLET BY MOUTH EVERY DAY 30 tablet 2    ferrous sulfate 324 (65 Fe) mg Take 1 tablet (324 mg total) by mouth daily 30 tablet 5    fluticasone (FLONASE) 50 mcg/act nasal spray 2 sprays into each nostril daily (Patient taking differently: 2 sprays into each nostril as needed ) 16 g 3    Lancets (OneTouch Delica Plus [id number]) MISC TEST BLOOD GLUCOSE ONCE DAILY 100 each 0    magnesium oxide (MAG-OX) 400 mg Take 1 tablet (400 mg total) by mouth 2 (two) times a day 180 tablet 2    metoprolol tartrate (LOPRESSOR) 50 mg tablet Take 1 tablet (50 mg total) by mouth 2 (two) times a day 180 tablet 3    Multiple Vitamins-Minerals (OCUVITE-LUTEIN PO) Take 1 tablet by mouth 2 (two) times a day Pt is taking preservision       nystatin (MYCOSTATIN) powder Apply topically 3 (three) times a day (Patient taking differently: Apply topically as needed ) 30 g 3    omeprazole (PriLOSEC) 40 MG capsule Take 1 capsule (40 mg total) by mouth daily 90 capsule 3    OneTouch Verio test strip TEST ONCE A  each 0    sodium bicarbonate 650 mg tablet Take 2 tablets (1,300 mg total) by mouth 2 (two) times a day 360 tablet 3    traMADol (ULTRAM) 50 mg tablet TAKE 1 TABLET BY MOUTH EVERY 6 HOURS AS NEEDED FOR MODERATE PAIN 20 tablet 0    vitamin E, tocopherol, 400 units capsule Take 400 Units by mouth daily      metFORMIN (GLUCOPHAGE-XR) 500 mg 24 hr tablet TAKE TWO TABLETS BY MOUTH TWICE A DAY WITH MEALS (Patient not taking: Reported on 3/31/2022) 360 tablet 0     No current facility-administered medications for this visit  Allergies  Allergies   Allergen Reactions    Fentanyl Hallucinations    Morphine And Related Other (See Comments)     While having an MI patient received morphine and had adverse reaction but doesn't know what happened  Past Medical History, Social History, Family History, medications and allergies were reviewed  Vitals  Vitals:    04/22/22 1524   BP: 144/90   Pulse: 64   SpO2: 97%   Weight: 83 kg (183 lb)   Height: 5' 6" (1 676 m)       Physical Exam  Physical Exam  On examination he is in no acute distress  His abdomen is soft nontender nondistended  Ileal conduit is noted  Stoma is pink and viable  Copious clear yellow urine noted in the bag  Skin is warm  Extremities without edema  Slight bloody discharge at meatus  Skin is warm    Extremities without edema    Results  Lab Results   Component Value Date    PSA 0 4 09/19/2018    PSA 0 4 10/04/2017    PSA 0 4 10/17/2016     Lab Results   Component Value Date    GLUCOSE 128 08/13/2015    CALCIUM 9 4 03/15/2022  08/13/2015    K 4 9 03/15/2022    CO2 26 03/15/2022     (H) 03/15/2022    BUN 45 (H) 03/15/2022    CREATININE 2 34 (H) 03/15/2022     Lab Results   Component Value Date    WBC 10 18 (H) 03/15/2022    HGB 13 4 03/15/2022    HCT 40 8 03/15/2022    MCV 94 03/15/2022     (L) 03/15/2022         Office Urine Dip  No results found for this or any previous visit (from the past 1 hour(s)) ]

## 2022-05-03 DIAGNOSIS — E11.3293 TYPE 2 DIABETES MELLITUS WITH BOTH EYES AFFECTED BY MILD NONPROLIFERATIVE RETINOPATHY WITHOUT MACULAR EDEMA, WITHOUT LONG-TERM CURRENT USE OF INSULIN (HCC): ICD-10-CM

## 2022-05-03 RX ORDER — DAPAGLIFLOZIN 5 MG/1
TABLET, FILM COATED ORAL
Qty: 30 TABLET | Refills: 2 | Status: SHIPPED | OUTPATIENT
Start: 2022-05-03

## 2022-05-11 ENCOUNTER — TELEPHONE (OUTPATIENT)
Dept: NEPHROLOGY | Facility: CLINIC | Age: 82
End: 2022-05-11

## 2022-05-11 DIAGNOSIS — E83.42 HYPOMAGNESEMIA: ICD-10-CM

## 2022-05-11 DIAGNOSIS — I10 BENIGN ESSENTIAL HYPERTENSION: ICD-10-CM

## 2022-05-11 DIAGNOSIS — N17.9 ACUTE KIDNEY INJURY (HCC): Primary | ICD-10-CM

## 2022-05-11 DIAGNOSIS — N18.4 STAGE 4 CHRONIC KIDNEY DISEASE (HCC): ICD-10-CM

## 2022-05-13 DIAGNOSIS — K22.70 BARRETT'S ESOPHAGUS WITH ESOPHAGITIS: ICD-10-CM

## 2022-05-13 DIAGNOSIS — K20.90 BARRETT'S ESOPHAGUS WITH ESOPHAGITIS: ICD-10-CM

## 2022-05-13 RX ORDER — OMEPRAZOLE 40 MG/1
40 CAPSULE, DELAYED RELEASE ORAL DAILY
Qty: 90 CAPSULE | Refills: 3 | Status: SHIPPED | OUTPATIENT
Start: 2022-05-13

## 2022-05-14 DIAGNOSIS — E11.59 TYPE 2 DIABETES MELLITUS WITH OTHER CIRCULATORY COMPLICATION, WITHOUT LONG-TERM CURRENT USE OF INSULIN (HCC): ICD-10-CM

## 2022-05-14 RX ORDER — BLOOD SUGAR DIAGNOSTIC
STRIP MISCELLANEOUS
Qty: 100 STRIP | Refills: 0 | Status: SHIPPED | OUTPATIENT
Start: 2022-05-14

## 2022-05-16 ENCOUNTER — APPOINTMENT (OUTPATIENT)
Dept: LAB | Facility: CLINIC | Age: 82
End: 2022-05-16
Payer: MEDICARE

## 2022-05-16 DIAGNOSIS — I10 ESSENTIAL HYPERTENSION, BENIGN: ICD-10-CM

## 2022-05-16 DIAGNOSIS — E83.42 HYPOMAGNESEMIA: ICD-10-CM

## 2022-05-16 DIAGNOSIS — E11.59 TYPE 2 DIABETES MELLITUS WITH OTHER CIRCULATORY COMPLICATION, WITHOUT LONG-TERM CURRENT USE OF INSULIN (HCC): ICD-10-CM

## 2022-05-16 DIAGNOSIS — D63.1 ANEMIA DUE TO STAGE 4 CHRONIC KIDNEY DISEASE (HCC): ICD-10-CM

## 2022-05-16 DIAGNOSIS — N18.4 ANEMIA DUE TO STAGE 4 CHRONIC KIDNEY DISEASE (HCC): ICD-10-CM

## 2022-05-16 DIAGNOSIS — N18.4 STAGE 4 CHRONIC KIDNEY DISEASE (HCC): ICD-10-CM

## 2022-05-16 DIAGNOSIS — I10 BENIGN ESSENTIAL HYPERTENSION: ICD-10-CM

## 2022-05-16 DIAGNOSIS — N17.9 ACUTE KIDNEY INJURY (HCC): ICD-10-CM

## 2022-05-16 LAB
ANION GAP SERPL CALCULATED.3IONS-SCNC: 4 MMOL/L (ref 4–13)
BASOPHILS # BLD AUTO: 0.07 THOUSANDS/ΜL (ref 0–0.1)
BASOPHILS NFR BLD AUTO: 1 % (ref 0–1)
BUN SERPL-MCNC: 59 MG/DL (ref 5–25)
CALCIUM SERPL-MCNC: 9.5 MG/DL (ref 8.3–10.1)
CHLORIDE SERPL-SCNC: 109 MMOL/L (ref 100–108)
CO2 SERPL-SCNC: 25 MMOL/L (ref 21–32)
CREAT SERPL-MCNC: 2.87 MG/DL (ref 0.6–1.3)
CREAT UR-MCNC: 81.9 MG/DL
EOSINOPHIL # BLD AUTO: 0.2 THOUSAND/ΜL (ref 0–0.61)
EOSINOPHIL NFR BLD AUTO: 2 % (ref 0–6)
ERYTHROCYTE [DISTWIDTH] IN BLOOD BY AUTOMATED COUNT: 14.3 % (ref 11.6–15.1)
EST. AVERAGE GLUCOSE BLD GHB EST-MCNC: 128 MG/DL
GFR SERPL CREATININE-BSD FRML MDRD: 19 ML/MIN/1.73SQ M
GLUCOSE P FAST SERPL-MCNC: 221 MG/DL (ref 65–99)
HBA1C MFR BLD: 6.1 %
HCT VFR BLD AUTO: 39.9 % (ref 36.5–49.3)
HGB BLD-MCNC: 12.9 G/DL (ref 12–17)
IMM GRANULOCYTES # BLD AUTO: 0.06 THOUSAND/UL (ref 0–0.2)
IMM GRANULOCYTES NFR BLD AUTO: 1 % (ref 0–2)
LYMPHOCYTES # BLD AUTO: 1.28 THOUSANDS/ΜL (ref 0.6–4.47)
LYMPHOCYTES NFR BLD AUTO: 12 % (ref 14–44)
MAGNESIUM SERPL-MCNC: 2.7 MG/DL (ref 1.6–2.6)
MCH RBC QN AUTO: 31.2 PG (ref 26.8–34.3)
MCHC RBC AUTO-ENTMCNC: 32.3 G/DL (ref 31.4–37.4)
MCV RBC AUTO: 97 FL (ref 82–98)
MONOCYTES # BLD AUTO: 0.8 THOUSAND/ΜL (ref 0.17–1.22)
MONOCYTES NFR BLD AUTO: 8 % (ref 4–12)
NEUTROPHILS # BLD AUTO: 7.98 THOUSANDS/ΜL (ref 1.85–7.62)
NEUTS SEG NFR BLD AUTO: 76 % (ref 43–75)
NRBC BLD AUTO-RTO: 0 /100 WBCS
PHOSPHATE SERPL-MCNC: 3.3 MG/DL (ref 2.3–4.1)
PLATELET # BLD AUTO: 156 THOUSANDS/UL (ref 149–390)
PMV BLD AUTO: 12.3 FL (ref 8.9–12.7)
POTASSIUM SERPL-SCNC: 5.4 MMOL/L (ref 3.5–5.3)
PROT UR-MCNC: 103 MG/DL
PROT/CREAT UR: 1.26 MG/G{CREAT} (ref 0–0.1)
PTH-INTACT SERPL-MCNC: 108.7 PG/ML (ref 18.4–80.1)
RBC # BLD AUTO: 4.13 MILLION/UL (ref 3.88–5.62)
SODIUM SERPL-SCNC: 138 MMOL/L (ref 136–145)
WBC # BLD AUTO: 10.39 THOUSAND/UL (ref 4.31–10.16)

## 2022-05-16 PROCEDURE — 83735 ASSAY OF MAGNESIUM: CPT

## 2022-05-16 PROCEDURE — 36415 COLL VENOUS BLD VENIPUNCTURE: CPT

## 2022-05-16 PROCEDURE — 84100 ASSAY OF PHOSPHORUS: CPT

## 2022-05-16 PROCEDURE — 83970 ASSAY OF PARATHORMONE: CPT

## 2022-05-16 PROCEDURE — 80048 BASIC METABOLIC PNL TOTAL CA: CPT

## 2022-05-16 PROCEDURE — 83036 HEMOGLOBIN GLYCOSYLATED A1C: CPT

## 2022-05-16 PROCEDURE — 85025 COMPLETE CBC W/AUTO DIFF WBC: CPT

## 2022-05-16 PROCEDURE — 84156 ASSAY OF PROTEIN URINE: CPT

## 2022-05-16 PROCEDURE — 82570 ASSAY OF URINE CREATININE: CPT

## 2022-05-18 ENCOUNTER — TELEPHONE (OUTPATIENT)
Dept: NEPHROLOGY | Facility: CLINIC | Age: 82
End: 2022-05-18

## 2022-05-18 NOTE — TELEPHONE ENCOUNTER
Appointment Confirmation   Person confirmed appointment with  If not patient, name of the person Patient    Date and time of appointment 5/19     3:30    Patient acknowledged and will be at appointment? yes    Did you advise the patient that they will need a urine sample if they are a new patient?  N/A    Did you advise the patient to bring their current medications for verification? (including any OTC) Yes    Additional Information

## 2022-05-19 ENCOUNTER — OFFICE VISIT (OUTPATIENT)
Dept: NEPHROLOGY | Facility: CLINIC | Age: 82
End: 2022-05-19
Payer: MEDICARE

## 2022-05-19 VITALS
HEIGHT: 66 IN | HEART RATE: 53 BPM | OXYGEN SATURATION: 98 % | DIASTOLIC BLOOD PRESSURE: 60 MMHG | WEIGHT: 184 LBS | BODY MASS INDEX: 29.57 KG/M2 | SYSTOLIC BLOOD PRESSURE: 138 MMHG

## 2022-05-19 DIAGNOSIS — I12.9 BENIGN HYPERTENSION WITH CHRONIC KIDNEY DISEASE, STAGE IV (HCC): ICD-10-CM

## 2022-05-19 DIAGNOSIS — N18.4 ANEMIA DUE TO STAGE 4 CHRONIC KIDNEY DISEASE (HCC): ICD-10-CM

## 2022-05-19 DIAGNOSIS — E87.2 METABOLIC ACIDOSIS: ICD-10-CM

## 2022-05-19 DIAGNOSIS — N18.4 STAGE 4 CHRONIC KIDNEY DISEASE (HCC): ICD-10-CM

## 2022-05-19 DIAGNOSIS — N18.4 BENIGN HYPERTENSION WITH CHRONIC KIDNEY DISEASE, STAGE IV (HCC): ICD-10-CM

## 2022-05-19 DIAGNOSIS — N25.81 SECONDARY HYPERPARATHYROIDISM OF RENAL ORIGIN (HCC): ICD-10-CM

## 2022-05-19 DIAGNOSIS — R79.89 ELEVATED SERUM CREATININE: Primary | ICD-10-CM

## 2022-05-19 DIAGNOSIS — E87.5 HYPERKALEMIA: ICD-10-CM

## 2022-05-19 DIAGNOSIS — E83.41 HYPERMAGNESEMIA: ICD-10-CM

## 2022-05-19 DIAGNOSIS — D63.1 ANEMIA DUE TO STAGE 4 CHRONIC KIDNEY DISEASE (HCC): ICD-10-CM

## 2022-05-19 PROCEDURE — 99214 OFFICE O/P EST MOD 30 MIN: CPT | Performed by: INTERNAL MEDICINE

## 2022-05-19 NOTE — PROGRESS NOTES
NEPHROLOGY OUTPATIENT PROGRESS NOTE   Mercedes Luke 80 y o  male MRN: [id number]  DATE: 5/19/2022  Reason for visit:   Chief Complaint   Patient presents with    Follow-up    Chronic Kidney Disease       ASSESSMENT and PLAN:  Elevated serum creatinine Chronic kidney disease stage 4  -New baseline creatinine likely 2 0-2 4, renal function worsened on last blood work to creatinine 2 8 with potassium 5 4 likely due to volume depletion from diarrhea  Has been on Ralph Gemma since 2021, so less likely Farxiga induced volume depletion  Will check BMP in a week to monitor renal function, if no improvement will consider kidney ultrasound  -recommend low k diet   -patient had loopogram in February 2021 which was found to be normal  -chronic kidney disease likely due to diabetes mellitus type 2 causing diabetic nephropathy, hypertensive nephrosclerosis, age-related nephron loss, episode of acute kidney injury/ATN    -PTH was 108 7 trending up - will monitor   -A1c 6 1  -avoid nephrotoxins  -avoid hypotension  -Referred to kidney smart education again   -already on Farxiga  -BMP in one week and follow up in 2 months with labs     Persistent proteinuria  -last upc ratio worsening 1 26 from 0 8  -likely due to hypertensive nephrosclerosis and diabetic nephropathy  -previously was on irbesartan which was stopped during episode of acute kidney injury in February 2021  -avoiding ARB due to worsening renal function and hyperkalemia    Hyperkalemia  -recommend low K diet and avoiding high k diet     Metabolic acidosis  - bicarbonate level 25  - Continue 1300 mg bid of sodium bicarbonate    -Use of sodium bicarbonate level has shown to decrease the rate of renal function decline in CKD population     Hypermagnesemia  -magnesium level has trended up with use of magnesium oxide twice a day given for low magnesium level in the past, will decrease magnesium oxide to 400 mg once a day and monitor level     Primary Hypertension with chronic kidney disease stage 4:   -Current medication:  Metoprolol     -Current blood pressure:  Stable  -Plan:    ? Continue metoprolol at current dose, recommend home monitoring of pulse rate and to call Cardiology if persistently less than 55   ? Patient previously on irbesartan prior to acute kidney injury in February 2021 but currently blood pressure well controlled, no need to add another medication  -Recommend 2 g sodium diet  -Recommend daily exercise and weight loss  -Discussed home monitoring of BP and maintaining a log of blood pressure   -Recommend goal BP less than 130/80  Secondary hyperparathyroidism of renal origin:  PTH level trending up, vitamin-D 31 1, would continue to monitor       Anemia due to stage 4 chronic kidney disease: improving to 12 9  Continue to monitor     Bladder cancer   -status post BCG and status post Keytruda  Status post radical cysto prostatectomy with 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   -status post drainage of anterior wall abscess  -s/p urethral biopsy -High grade, non-invasive papillary urothelial carcinoma   -continue follow-up with Urology       History of right pelvic abscess: status post IR guided drainage and drain placement-removed on 03/05/2021  -antibiotics course completed        Patient Instructions   New baseline creatinine likely 2 0-2 4, renal function worsened on last blood work to creatinine 2 8 with potassium 5 4 likely due to volume depletion from diarrhea  Has been on [address] since 2021, so less likely Farxiga induced volume depletion  Will check BMP in a week to monitor renal function, if no improvement will consider kidney ultrasound    Recommend decreasing magnesium oxide to once a day    Stressed on oral hydration  Recommend drinking at least 60-65 oz of water per day  Blood work in a week    Follow-up in 2 months with repeat blood work about a week before the appointment      Hyperkalemia   WHAT YOU NEED TO KNOW:   What is hyperkalemia? Hyperkalemia is a high level of potassium in your blood  Potassium helps control how your muscles, heart, and digestive system work  What causes hyperkalemia? · Intense and prolonged exercise    · Medical conditions, such as diabetes, HIV, tuberculosis, or kidney disease    · Medicines, such as pain medicine and heart or blood pressure medicine    · A diet that is high in potassium    · Trauma, such as muscle injury, burns, or surgery    What are the signs and symptoms of hyperkalemia? · Muscle cramps or pain    · Diarrhea    · Abdominal pain    · Numbness or weakness    How is hyperkalemia diagnosed? · An EKG  test records your heart rhythm and how fast your heart beats  It is used to check for irregular heartbeats  · Blood tests  are done to check your potassium level  How is hyperkalemia treated? · Medicines  will be given to remove potassium from your body  This will lower your potassium levels  This medicine may be given as a pill or an enema  · Dialysis  may be needed if other treatments do not work  Dialysis uses a machine to remove waste products and toxins from your blood  Ask your healthcare provider for more information about dialysis  How can I manage my symptoms? Limit the amount of potassium you eat  Foods that are high in potassium include bananas, tomatoes, oranges, turkey, and milk  Orange juice, citrus juices, and tomato juice are also high in potassium  Do not use salt substitutes  You may need to meet with a dietitian to help plan the best meals for you  When should I contact my healthcare provider? · You have nausea or are vomiting  · You have numbness or tingling in your arms or legs  · Your symptoms do not go away or get worse  · You have questions or concerns about your condition or care  When should I seek immediate care or call 911? · You have trouble breathing  · You have an irregular heartbeat      · You have trouble controlling your muscles  · You are too tired or weak to stand up  CARE AGREEMENT:   You have the right to help plan your care  Learn about your health condition and how it may be treated  Discuss treatment options with your healthcare providers to decide what care you want to receive  You always have the right to refuse treatment  The above information is an  only  It is not intended as medical advice for individual conditions or treatments  Talk to your doctor, nurse or pharmacist before following any medical regimen to see if it is safe and effective for you  © Copyright [employer] 2022 Information is for End User's use only and may not be sold, redistributed or otherwise used for commercial purposes  All illustrations and images included in CareNotes® are the copyrighted property of A D A M , Inc  or [address] [place] [address] and all orders for this visit:    Elevated serum creatinine  -     Ambulatory referral to ckd education program; Future  -     Basic metabolic panel; Future  -     Basic metabolic panel; Future  -     Protein / creatinine ratio, urine; Future  -     Urinalysis with microscopic; Future  -     Phosphorus; Future    Stage 4 chronic kidney disease ([facility] )  -     Ambulatory referral to ckd education program; Future  -     Basic metabolic panel; Future  -     Basic metabolic panel; Future  -     Protein / creatinine ratio, urine; Future  -     Urinalysis with microscopic; Future  -     Phosphorus; Future    Hyperkalemia  -     Basic metabolic panel; Future    Hypermagnesemia  -     magnesium oxide (MAG-OX) 400 mg; Take 1 tablet (400 mg total) by mouth in the morning   -     Magnesium; Future    Metabolic acidosis  -     Basic metabolic panel; Future    Benign hypertension with chronic kidney disease, stage IV (HCC)  -     Basic metabolic panel; Future    Secondary hyperparathyroidism of renal origin ([facility] )  -     Vitamin D 25 hydroxy;  Future    Anemia due to stage 4 chronic kidney disease ([facility] )            SUBJECTIVE / HPI:  Consuelo Frias is a 80 y o   male with past history of CAD status post CABG, bladder cancer status post ileal conduit in October 2020, diabetes mellitus type 2, hypertension presenting for follow-up of chronic kidney disease    - Patient was 1st seen by Nephrology during hospital admission and had acute kidney injury at that time  Was thought to be prerenal, admission creatinine was 3 5 and improved to 1 9 in February 2021  During last office visit with advanced practitioner creatinine was around 2 0     - renal function recently since February 2021 to July 2021 was reviewed, creatinine has been around 1 9-2 2  Renal function was at creatinine 1 9-2 3 till March 2022 but further worsened to creatinine 2 8 on blood work from 05/16    Was started on Mary Mood on May 3, 2022  Last blood work was from May 16 when the creatinine has trended up to 2 8, potassium 5 4     No use of NSAIDs   Had diarrhea 3 times daily     REVIEW OF SYSTEMS:    Review of Systems   Constitutional: Negative for activity change, appetite change, chills, diaphoresis, fatigue and fever  HENT: Negative for congestion, facial swelling and nosebleeds  Eyes: Negative for pain and visual disturbance  Respiratory: Negative for cough, chest tightness and shortness of breath  Cardiovascular: Negative for chest pain and palpitations  Gastrointestinal: Negative for abdominal distention, abdominal pain, diarrhea, nausea and vomiting  Genitourinary: Negative for difficulty urinating, dysuria, flank pain, frequency and hematuria  Musculoskeletal: Negative for arthralgias, back pain and joint swelling  Skin: Negative for rash  Neurological: Negative for dizziness, seizures, syncope, weakness and headaches  Psychiatric/Behavioral: Negative for agitation and confusion  The patient is not nervous/anxious          More than 10 point review of systems were obtained and discussed in length with the patient  Complete review of systems were negative / unremarkable except mentioned above         PAST MEDICAL HISTORY:  Past Medical History:   Diagnosis Date    Acute kidney injury ([facility] )     Arthritis     Hands    Wright esophagus     BPH with obstruction/lower urinary tract symptoms     CAD (coronary artery disease)     Last assessed 09/16/15    Cancer ([facility] )     bladder    Cataract, acquired     Last assessed 10/10/17    Chronic kidney disease     Coronary artery disease     Diabetes mellitus ([facility] )     NIDDM    Diabetic neuropathy ([facility] )     Feet    Enlarged prostate with lower urinary tract symptoms (LUTS)     Last assessed 10/10/17    Erectile dysfunction     GERD (gastroesophageal reflux disease)     Last assessed 10/10/17    Hemoptysis     Last assessed 03/14/16    Hypercholesterolemia     Last assessed 10/10/17    Hypertension     Last assessed 10/10/17    Macular degeneration     Right eye is particularly affected-peripheral vision intact    Myocardial infarction ([facility] ) 1998    OAB (overactive bladder)     Testicular hypofunction     Testicular hypogonadism     Last assessed 10/10/17    Tinnitus     Umbilical hernia     Last assessed 06/18/14    Urge incontinence of urine     Wears glasses        PAST SURGICAL HISTORY:  Past Surgical History:   Procedure Laterality Date    COLONOSCOPY      CORONARY ARTERY BYPASS GRAFT  07/16/2014    ABG x 4 LIMA to LAD,SVG to diagnoal 2 SVG to OM-1, SVG to PDA, resection of partial plerual mass    CT GUIDED PERC DRAINAGE CATHETER PLACEMENT  2/19/2021    CYSTOSCOPY  2013    CYSTOSCOPY  02/08/2022    [place]    ESOPHAGOGASTRODUODENOSCOPY      FL RETROGRADE PYELOGRAM  12/20/2018    FL RETROGRADE PYELOGRAM  12/5/2019    INGUINAL HERNIA REPAIR Bilateral 2015    IR DRAINAGE TUBE CHECK AND/OR REMOVAL  3/5/2021    PHOTODYNAMIC THERAPY      For Barretts esophagus    CA COLONOSCOPY FLX DX W/COLLJ SPEC WHEN PFRMD N/A 4/25/2016    Procedure: COLONOSCOPY;  Surgeon: Mary Hernandez MD;  Location:  GI LAB; Service: Gastroenterology    OK CYSTOURETHROSCOPY,BIOPSY N/A 9/10/2020    Procedure: CYSTOSCOPY, COLLECTION OF LEFT KIDNEY CYTOLOGY, BILATERAL RETROGRADE PYELOGRAM, BLADDER WALL BIOPSIES  AND [place] [employer], RANDOM BLADDER TUMOR BIOPSIES;  Surgeon: Katherin Shone, MD;  Location: [address] [place] [address] MAIN OR;  Service: Urology    OK CYSTOURETHROSCOPY,BIOPSY N/A 2022    Procedure: urethroscopy , biopsy of urethra with fulgeration;  Surgeon: En Wade MD;  Location:  MAIN OR;  Service: Urology    OK CYSTOURETHROSCOPY,FULGUR 2-5 CM LESN N/A 2020    Procedure: CYSTOSCOPY, TRANSURETHRAL RESECTION OF BLADDER TUMOR (TURBT);   Surgeon: Katherin Shone, MD;  Location[facility] Main [facility];  Service: Urology    OK CYSTOURETHROSCOPY,FULGUR <0 5 CM LESN N/A 2019    Procedure: CYSTO W/TURBT AND TRANSURETHRAL PROSTATE BIOPSY AND OPENING OF BLADDER NECK CONTRACTURE, B/L Retrograde pyelogram;  Surgeon: Katherin Shone, MD;  Location: [facility];  Service: Urology    TONSILLECTOMY      TRANSURETHRAL RESECTION OF PROSTATE N/A 2018    Procedure: CYSTO, PHOTO SELECTIVE VAPORIZATION OF PROSTATE, B/L RETROGRADE PYELOGRAM, TURBT;  Surgeon: Katherin Shone, MD;  Location: [facility] OR;  Service: Urology    UMBILICAL HERNIA REPAIR  2012    UPPER GASTROINTESTINAL ENDOSCOPY         SOCIAL HISTORY:  Social History     Substance and Sexual Activity   Alcohol Use Not Currently    Alcohol/week: 2 0 standard drinks    Types: 1 Glasses of wine, 1 Cans of beer per week     Social History     Substance and Sexual Activity   Drug Use Never     Social History     Tobacco Use   Smoking Status Former Smoker    Packs/day: 1 00    Years: 13 00    Pack years: 13     Types: Cigarettes    Quit date: Yuvonne Schilder Years since quittin 4   Smokeless Tobacco Never Used       FAMILY HISTORY:  Family History   Problem Relation Age of Onset    Cancer Mother    Shauna Can Other Mother Digestive System Complications    Diabetes Father     Heart disease Father     Hypertension Father     Coronary artery disease Father     Hyperlipidemia Father        MEDICATIONS:    Current Outpatient Medications:     acetaminophen (TYLENOL) 500 mg tablet, Take 1,000 mg by mouth every 6 (six) hours as needed for mild pain or moderate pain , Disp: , Rfl:     atorvastatin (LIPITOR) 40 mg tablet, TAKE ONE TABLET BY MOUTH AT BEDTIME, Disp: 90 tablet, Rfl: 3    Blood Glucose Monitoring Suppl (OneTouch Verio Flex System) w/Device KIT, USE TO TEST BLOOD GLUCOSE DAILY, Disp: 1 kit, Rfl: 0    Cholecalciferol (VITAMIN D) 50 MCG (2000 UT) tablet, Take 2,000 Units by mouth daily, Disp: , Rfl:     citalopram (CeleXA) 20 mg tablet, TAKE ONE TABLET BY MOUTH EVERY DAY, Disp: 30 tablet, Rfl: 3    Farxiga 5 MG TABS, TAKE ONE TABLET BY MOUTH EVERY DAY, Disp: 30 tablet, Rfl: 2    ferrous sulfate 324 (65 Fe) mg, Take 1 tablet (324 mg total) by mouth daily, Disp: 30 tablet, Rfl: 5    fluticasone (FLONASE) 50 mcg/act nasal spray, 2 sprays into each nostril daily (Patient taking differently: 2 sprays into each nostril as needed), Disp: 16 g, Rfl: 3    Lancets (OneTouch Delica Plus [id number]) MISC, TEST BLOOD GLUCOSE ONCE DAILY, Disp: 100 each, Rfl: 0    magnesium oxide (MAG-OX) 400 mg, Take 1 tablet (400 mg total) by mouth in the morning , Disp: 180 tablet, Rfl: 2    metoprolol tartrate (LOPRESSOR) 50 mg tablet, Take 1 tablet (50 mg total) by mouth 2 (two) times a day, Disp: 180 tablet, Rfl: 3    Multiple Vitamins-Minerals (OCUVITE-LUTEIN PO), Take 1 tablet by mouth 2 (two) times a day Pt is taking preservision , Disp: , Rfl:     nystatin (MYCOSTATIN) powder, Apply topically 3 (three) times a day (Patient taking differently: Apply topically as needed), Disp: 30 g, Rfl: 3    omeprazole (PriLOSEC) 40 MG capsule, Take 1 capsule (40 mg total) by mouth in the morning , Disp: 90 capsule, Rfl: 3    OneTouch Verio test strip, TEST ONCE A DAY, Disp: 100 strip, Rfl: 0    sodium bicarbonate 650 mg tablet, Take 2 tablets (1,300 mg total) by mouth 2 (two) times a day, Disp: 360 tablet, Rfl: 3    vitamin E, tocopherol, 400 units capsule, Take 400 Units by mouth daily, Disp: , Rfl:     traMADol (ULTRAM) 50 mg tablet, TAKE 1 TABLET BY MOUTH EVERY 6 HOURS AS NEEDED FOR MODERATE PAIN (Patient not taking: Reported on 5/19/2022), Disp: 20 tablet, Rfl: 0      PHYSICAL EXAM:  Vitals:    05/19/22 1520   BP: 138/60   BP Location: Left arm   Patient Position: Sitting   Cuff Size: Standard   Pulse: (!) 53   SpO2: 98%   Weight: 83 5 kg (184 lb)   Height: 5' 6" (1 676 m)     Body mass index is 29 7 kg/m²  Physical Exam  Constitutional:       General: He is not in acute distress  Appearance: He is well-developed  He is not diaphoretic  HENT:      Head: Normocephalic and atraumatic  Mouth/Throat:      Mouth: Mucous membranes are moist    Eyes:      General: No scleral icterus  Conjunctiva/sclera: Conjunctivae normal       Pupils: Pupils are equal, round, and reactive to light  Neck:      Thyroid: No thyromegaly  Cardiovascular:      Rate and Rhythm: Normal rate and regular rhythm  Heart sounds: Normal heart sounds  No murmur heard  No friction rub  Pulmonary:      Effort: Pulmonary effort is normal  No respiratory distress  Breath sounds: Normal breath sounds  No wheezing or rales  Abdominal:      General: Bowel sounds are normal  There is no distension  Palpations: Abdomen is soft  Tenderness: There is no abdominal tenderness  Comments: Ileal conduit with clear urine    Musculoskeletal:         General: No deformity  Cervical back: Neck supple  Right lower leg: No edema  Left lower leg: No edema  Lymphadenopathy:      Cervical: No cervical adenopathy  Skin:     Coloration: Skin is not pale  Nails: There is no clubbing     Neurological:      Mental Status: He is alert and oriented to person, place, and time  He is not disoriented  Psychiatric:         Mood and Affect: Mood normal  Mood is not anxious  Affect is not inappropriate  Behavior: Behavior normal          Thought Content: Thought content normal          Lab Results:   Results for orders placed or performed in visit on 45/67/16   Basic metabolic panel   Result Value Ref Range    Sodium 138 136 - 145 mmol/L    Potassium 5 4 (H) 3 5 - 5 3 mmol/L    Chloride 109 (H) 100 - 108 mmol/L    CO2 25 21 - 32 mmol/L    ANION GAP 4 4 - 13 mmol/L    BUN 59 (H) 5 - 25 mg/dL    Creatinine 2 87 (H) 0 60 - 1 30 mg/dL    Glucose, Fasting 221 (H) 65 - 99 mg/dL    Calcium 9 5 8 3 - 10 1 mg/dL    eGFR 19 ml/min/1 73sq m   Hemoglobin A1C   Result Value Ref Range    Hemoglobin A1C 6 1 (H) Normal 3 8-5 6%; PreDiabetic 5 7-6 4%;  Diabetic >=6 5%; Glycemic control for adults with diabetes <7 0% %     mg/dl   CBC and differential   Result Value Ref Range    WBC 10 39 (H) 4 31 - 10 16 Thousand/uL    RBC 4 13 3 88 - 5 62 Million/uL    Hemoglobin 12 9 12 0 - 17 0 g/dL    Hematocrit 39 9 36 5 - 49 3 %    MCV 97 82 - 98 fL    MCH 31 2 26 8 - 34 3 pg    MCHC 32 3 31 4 - 37 4 g/dL    RDW 14 3 11 6 - 15 1 %    MPV 12 3 8 9 - 12 7 fL    Platelets 619 853 - 365 Thousands/uL    nRBC 0 /100 WBCs    Neutrophils Relative 76 (H) 43 - 75 %    Immat GRANS % 1 0 - 2 %    Lymphocytes Relative 12 (L) 14 - 44 %    Monocytes Relative 8 4 - 12 %    Eosinophils Relative 2 0 - 6 %    Basophils Relative 1 0 - 1 %    Neutrophils Absolute 7 98 (H) 1 85 - 7 62 Thousands/µL    Immature Grans Absolute 0 06 0 00 - 0 20 Thousand/uL    Lymphocytes Absolute 1 28 0 60 - 4 47 Thousands/µL    Monocytes Absolute 0 80 0 17 - 1 22 Thousand/µL    Eosinophils Absolute 0 20 0 00 - 0 61 Thousand/µL    Basophils Absolute 0 07 0 00 - 0 10 Thousands/µL   Magnesium   Result Value Ref Range    Magnesium 2 7 (H) 1 6 - 2 6 mg/dL   Phosphorus   Result Value Ref Range    Phosphorus 3 3 2 3 - 4 1 mg/dL   Protein / creatinine ratio, urine   Result Value Ref Range    Creatinine, Ur 81 9 mg/dL    Protein Urine Random 103 mg/dL    Prot/Creat Ratio, Ur 1 26 (H) 0 00 - 0 10   PTH, intact   Result Value Ref Range     7 (H) 18 4 - 80 1 pg/mL

## 2022-05-19 NOTE — PATIENT INSTRUCTIONS
New baseline creatinine likely 2 0-2 4, renal function worsened on last blood work to creatinine 2 8 with potassium 5 4 likely due to volume depletion from diarrhea  Has been on [address] since 2021, so less likely Farxiga induced volume depletion  Will check BMP in a week to monitor renal function, if no improvement will consider kidney ultrasound    Recommend decreasing magnesium oxide to once a day    Stressed on oral hydration  Recommend drinking at least 60-65 oz of water per day  Blood work in a week  Follow-up in 2 months with repeat blood work about a week before the appointment      Hyperkalemia   WHAT YOU NEED TO KNOW:   What is hyperkalemia? Hyperkalemia is a high level of potassium in your blood  Potassium helps control how your muscles, heart, and digestive system work  What causes hyperkalemia? Intense and prolonged exercise    Medical conditions, such as diabetes, HIV, tuberculosis, or kidney disease    Medicines, such as pain medicine and heart or blood pressure medicine    A diet that is high in potassium    Trauma, such as muscle injury, burns, or surgery    What are the signs and symptoms of hyperkalemia? Muscle cramps or pain    Diarrhea    Abdominal pain    Numbness or weakness    How is hyperkalemia diagnosed? An EKG  test records your heart rhythm and how fast your heart beats  It is used to check for irregular heartbeats  Blood tests  are done to check your potassium level  How is hyperkalemia treated? Medicines  will be given to remove potassium from your body  This will lower your potassium levels  This medicine may be given as a pill or an enema  Dialysis  may be needed if other treatments do not work  Dialysis uses a machine to remove waste products and toxins from your blood  Ask your healthcare provider for more information about dialysis  How can I manage my symptoms? Limit the amount of potassium you eat   Foods that are high in potassium include bananas, tomatoes, oranges, turkey, and milk  Orange juice, citrus juices, and tomato juice are also high in potassium  Do not use salt substitutes  You may need to meet with a dietitian to help plan the best meals for you  When should I contact my healthcare provider? You have nausea or are vomiting  You have numbness or tingling in your arms or legs  Your symptoms do not go away or get worse  You have questions or concerns about your condition or care  When should I seek immediate care or call 911? You have trouble breathing  You have an irregular heartbeat  You have trouble controlling your muscles  You are too tired or weak to stand up  CARE AGREEMENT:   You have the right to help plan your care  Learn about your health condition and how it may be treated  Discuss treatment options with your healthcare providers to decide what care you want to receive  You always have the right to refuse treatment  The above information is an  only  It is not intended as medical advice for individual conditions or treatments  Talk to your doctor, nurse or pharmacist before following any medical regimen to see if it is safe and effective for you  © Copyright [employer] [facility] 2022 Information is for End User's use only and may not be sold, redistributed or otherwise used for commercial purposes   All illustrations and images included in CareNotes® are the copyrighted property of A D A M , Inc  or [address] [place] [address]

## 2022-05-24 DIAGNOSIS — F32.A ANXIETY AND DEPRESSION: ICD-10-CM

## 2022-05-24 DIAGNOSIS — F41.9 ANXIETY AND DEPRESSION: ICD-10-CM

## 2022-05-24 RX ORDER — CITALOPRAM 20 MG/1
TABLET ORAL
Qty: 30 TABLET | Refills: 3 | Status: SHIPPED | OUTPATIENT
Start: 2022-05-24

## 2022-05-26 ENCOUNTER — LAB (OUTPATIENT)
Dept: LAB | Facility: CLINIC | Age: 82
End: 2022-05-26
Payer: MEDICARE

## 2022-05-26 DIAGNOSIS — N18.4 STAGE 4 CHRONIC KIDNEY DISEASE (HCC): ICD-10-CM

## 2022-05-26 DIAGNOSIS — R79.89 ELEVATED SERUM CREATININE: ICD-10-CM

## 2022-05-26 LAB
ANION GAP SERPL CALCULATED.3IONS-SCNC: 10 MMOL/L (ref 4–13)
BUN SERPL-MCNC: 50 MG/DL (ref 5–25)
CALCIUM SERPL-MCNC: 9.5 MG/DL (ref 8.3–10.1)
CHLORIDE SERPL-SCNC: 111 MMOL/L (ref 100–108)
CO2 SERPL-SCNC: 18 MMOL/L (ref 21–32)
CREAT SERPL-MCNC: 2.47 MG/DL (ref 0.6–1.3)
GFR SERPL CREATININE-BSD FRML MDRD: 23 ML/MIN/1.73SQ M
GLUCOSE P FAST SERPL-MCNC: 164 MG/DL (ref 65–99)
POTASSIUM SERPL-SCNC: 5.1 MMOL/L (ref 3.5–5.3)
SODIUM SERPL-SCNC: 139 MMOL/L (ref 136–145)

## 2022-05-26 PROCEDURE — 36415 COLL VENOUS BLD VENIPUNCTURE: CPT

## 2022-05-26 PROCEDURE — 80048 BASIC METABOLIC PNL TOTAL CA: CPT

## 2022-05-26 NOTE — RESULT ENCOUNTER NOTE
Please inform patient that renal function has improved to creatinine 2 4 with hydration and potassium level is now at normal range at 5 1 with following low-potassium diet  Continue to follow low-potassium diet and continue oral hydration  Bicarb level /CO2 is slightly on lower side, continue to monitor  If persistently low would consider increasing sodium bicarbonate tablet dose, please stressed on compliance with oral sodium bicarbonate tablet for now  Patient is already ordered for repeat BMP and other labs in 2 months and follow-up in 2 months

## 2022-05-27 ENCOUNTER — TELEPHONE (OUTPATIENT)
Dept: NEPHROLOGY | Facility: CLINIC | Age: 82
End: 2022-05-27

## 2022-05-27 NOTE — TELEPHONE ENCOUNTER
Spoke with patient and advised: Please inform patient that renal function has improved to creatinine 2 4 with hydration and potassium level is now at normal range at 5 1 with following low-potassium diet   Continue to follow low-potassium diet and continue oral hydration   Bicarb level /CO2 is slightly on lower side, continue to monitor   If persistently low would consider increasing sodium bicarbonate tablet dose, please stressed on compliance with oral sodium bicarbonate tablet for now   Patient is already ordered for repeat BMP and other labs in 2 months and follow-up in 2 months  No questions at this time

## 2022-05-27 NOTE — TELEPHONE ENCOUNTER
----- Message from Arthur Wang MD sent at 5/26/2022  4:18 PM EDT -----  Please inform patient that renal function has improved to creatinine 2 4 with hydration and potassium level is now at normal range at 5 1 with following low-potassium diet  Continue to follow low-potassium diet and continue oral hydration  Bicarb level /CO2 is slightly on lower side, continue to monitor  If persistently low would consider increasing sodium bicarbonate tablet dose, please stressed on compliance with oral sodium bicarbonate tablet for now  Patient is already ordered for repeat BMP and other labs in 2 months and follow-up in 2 months

## 2022-05-31 ENCOUNTER — OFFICE VISIT (OUTPATIENT)
Dept: ENDOCRINOLOGY | Facility: CLINIC | Age: 82
End: 2022-05-31
Payer: MEDICARE

## 2022-05-31 VITALS
DIASTOLIC BLOOD PRESSURE: 70 MMHG | SYSTOLIC BLOOD PRESSURE: 120 MMHG | BODY MASS INDEX: 27.89 KG/M2 | HEIGHT: 68 IN | WEIGHT: 184 LBS | HEART RATE: 50 BPM

## 2022-05-31 DIAGNOSIS — E11.3293 TYPE 2 DIABETES MELLITUS WITH BOTH EYES AFFECTED BY MILD NONPROLIFERATIVE RETINOPATHY WITHOUT MACULAR EDEMA, WITHOUT LONG-TERM CURRENT USE OF INSULIN (HCC): Primary | ICD-10-CM

## 2022-05-31 PROCEDURE — 99213 OFFICE O/P EST LOW 20 MIN: CPT | Performed by: INTERNAL MEDICINE

## 2022-05-31 NOTE — PROGRESS NOTES
Aishwarya Bound 80 y o  male MRN: [id number]    Encounter: [id number]      Assessment/Plan     Assessment: This is a 80y o -year-old male with diabetes without hyperglycemia complicated by retinopathy and stage 4 chronic kidney disease  Plan:  1  Type 2 diabetes is well controlled based on hemoglobin A1c  Continue current medications  CC: Diabetes    History of Present Illness     HPI:  59-year-old male with type 2 diabetes presents for follow-up  He is currently on oral agents  There has been no hypoglycemia  His diabetes is complicated by retinopathy and chronic kidney disease  Review of Systems   Constitutional: Negative for chills and fever  Respiratory: Negative for shortness of breath  Cardiovascular: Negative for chest pain  Gastrointestinal: Negative for constipation, diarrhea, nausea and vomiting  All other systems reviewed and are negative        Historical Information   Past Medical History:   Diagnosis Date    Acute kidney injury ([address] )     Arthritis     Hands    Wright esophagus     BPH with obstruction/lower urinary tract symptoms     CAD (coronary artery disease)     Last assessed 09/16/15    Cancer ([address] )     bladder    Cataract, acquired     Last assessed 10/10/17    Chronic kidney disease     Coronary artery disease     Diabetes mellitus ([address] )     NIDDM    Diabetic neuropathy ([address] )     Feet    Enlarged prostate with lower urinary tract symptoms (LUTS)     Last assessed 10/10/17    Erectile dysfunction     GERD (gastroesophageal reflux disease)     Last assessed 10/10/17    Hemoptysis     Last assessed 03/14/16    Hypercholesterolemia     Last assessed 10/10/17    Hypertension     Last assessed 10/10/17    Macular degeneration     Right eye is particularly affected-peripheral vision intact    Myocardial infarction ([facility] ) 1998    OAB (overactive bladder)     Testicular hypofunction     Testicular hypogonadism     Last assessed 10/10/17    Tinnitus     Umbilical hernia     Last assessed 06/18/14    Urge incontinence of urine     Wears glasses      Past Surgical History:   Procedure Laterality Date    COLONOSCOPY      CORONARY ARTERY BYPASS GRAFT  07/16/2014    ABG x 4 LIMA to LAD,SVG to diagnoal 2 SVG to OM-1, SVG to PDA, resection of partial plerual mass    CT GUIDED PERC DRAINAGE CATHETER PLACEMENT  2/19/2021    CYSTOSCOPY  2013    CYSTOSCOPY  02/08/2022    [place]    ESOPHAGOGASTRODUODENOSCOPY      FL RETROGRADE PYELOGRAM  12/20/2018    FL RETROGRADE PYELOGRAM  12/5/2019    INGUINAL HERNIA REPAIR Bilateral 2015    IR DRAINAGE TUBE CHECK AND/OR REMOVAL  3/5/2021    PHOTODYNAMIC THERAPY      For Barretts esophagus    CA COLONOSCOPY FLX DX W/COLLJ SPEC WHEN PFRMD N/A 4/25/2016    Procedure: COLONOSCOPY;  Surgeon: Mary Hernandez MD;  Location:  GI LAB; Service: Gastroenterology    CA CYSTOURETHROSCOPY,BIOPSY N/A 9/10/2020    Procedure: CYSTOSCOPY, COLLECTION OF LEFT KIDNEY CYTOLOGY, BILATERAL RETROGRADE PYELOGRAM, BLADDER WALL BIOPSIES  AND [address], RANDOM BLADDER TUMOR BIOPSIES;  Surgeon: Katherin Shone, MD;  Location: [address] [place] [address] MAIN OR;  Service: Urology    CA CYSTOURETHROSCOPY,BIOPSY N/A 4/5/2022    Procedure: urethroscopy , biopsy of urethra with fulgeration;  Surgeon: En Wade MD;  Location:  MAIN OR;  Service: Urology    CA CYSTOURETHROSCOPY,FULGUR 2-5 CM LESN N/A 4/16/2020    Procedure: CYSTOSCOPY, TRANSURETHRAL RESECTION OF BLADDER TUMOR (TURBT);   Surgeon: Katherin Shone, MD;  [facility];  Service: Urology    CA CYSTOURETHROSCOPY,FULGUR <0 5 CM LESN N/A 12/5/2019    Procedure: CYSTO W/TURBT AND TRANSURETHRAL PROSTATE BIOPSY AND OPENING OF BLADDER NECK CONTRACTURE, B/L Retrograde pyelogram;  Surgeon: Katherin Shone, MD;  [facility];  Service: Urology    TONSILLECTOMY      TRANSURETHRAL RESECTION OF PROSTATE N/A 12/20/2018    Procedure: CYSTO, PHOTO SELECTIVE VAPORIZATION OF PROSTATE, B/L RETROGRADE PYELOGRAM, TURBT;  Surgeon: Danny Alas MD;  [facility];  Service: Urology    UMBILICAL HERNIA REPAIR      UPPER GASTROINTESTINAL ENDOSCOPY       Social History   Social History     Substance and Sexual Activity   Alcohol Use Not Currently    Alcohol/week: 2 0 standard drinks    Types: 1 Glasses of wine, 1 Cans of beer per week     Social History     Substance and Sexual Activity   Drug Use Never     Social History     Tobacco Use   Smoking Status Former Smoker    Packs/day:  00    Years: 13 00    Pack years: 13     Types: Cigarettes    Quit date: 0    Years since quittin 4   Smokeless Tobacco Never Used     Family History:   Family History   Problem Relation Age of Onset   Emaline Folds Cancer Mother     Other Mother         Digestive System Complications    Diabetes Father     Heart disease Father     Hypertension Father     Coronary artery disease Father     Hyperlipidemia Father        Meds/Allergies   Current Outpatient Medications   Medication Sig Dispense Refill    acetaminophen (TYLENOL) 500 mg tablet Take 1,000 mg by mouth every 6 (six) hours as needed for mild pain or moderate pain       atorvastatin (LIPITOR) 40 mg tablet TAKE ONE TABLET BY MOUTH AT BEDTIME 90 tablet 3    Blood Glucose Monitoring Suppl (OneTouch Verio Flex System) w/Device KIT USE TO TEST BLOOD GLUCOSE DAILY 1 kit 0    Cholecalciferol (VITAMIN D) 50 MCG (2000 UT) tablet Take 2,000 Units by mouth daily      citalopram (CeleXA) 20 mg tablet TAKE ONE TABLET BY MOUTH EVERY DAY 30 tablet 3    Farxiga 5 MG TABS TAKE ONE TABLET BY MOUTH EVERY DAY 30 tablet 2    ferrous sulfate 324 (65 Fe) mg Take 1 tablet (324 mg total) by mouth daily 30 tablet 5    fluticasone (FLONASE) 50 mcg/act nasal spray 2 sprays into each nostril daily (Patient taking differently: 2 sprays into each nostril as needed) 16 g 3    Lancets (OneTouch Delica Plus PERGFT38Q) MISC TEST BLOOD GLUCOSE ONCE DAILY 100 each 0    magnesium oxide (MAG-OX) 400 mg Take 1 tablet (400 mg total) by mouth in the morning  180 tablet 2    metoprolol tartrate (LOPRESSOR) 50 mg tablet Take 1 tablet (50 mg total) by mouth 2 (two) times a day 180 tablet 3    Multiple Vitamins-Minerals (OCUVITE-LUTEIN PO) Take 1 tablet by mouth 2 (two) times a day Pt is taking preservision       nystatin (MYCOSTATIN) powder Apply topically 3 (three) times a day (Patient taking differently: Apply topically as needed) 30 g 3    omeprazole (PriLOSEC) 40 MG capsule Take 1 capsule (40 mg total) by mouth in the morning  90 capsule 3    OneTouch Verio test strip TEST ONCE A  strip 0    sodium bicarbonate 650 mg tablet Take 2 tablets (1,300 mg total) by mouth 2 (two) times a day 360 tablet 3    vitamin E, tocopherol, 400 units capsule Take 400 Units by mouth daily      traMADol (ULTRAM) 50 mg tablet TAKE 1 TABLET BY MOUTH EVERY 6 HOURS AS NEEDED FOR MODERATE PAIN (Patient not taking: Reported on 5/19/2022) 20 tablet 0     No current facility-administered medications for this visit  Allergies   Allergen Reactions    Fentanyl Hallucinations    Morphine And Related Other (See Comments)     While having an MI patient received morphine and had adverse reaction but doesn't know what happened  Objective   Vitals: Blood pressure 120/70, pulse (!) 50, height 5' 8" (1 727 m), weight 83 5 kg (184 lb)  Physical Exam  Vitals reviewed  Constitutional:       General: He is not in acute distress  Appearance: He is well-developed  He is not diaphoretic  HENT:      Head: Normocephalic and atraumatic  Mouth/Throat:      Pharynx: No oropharyngeal exudate  Eyes:      General: Lids are normal  No scleral icterus  Right eye: No discharge  Left eye: No discharge  Conjunctiva/sclera: Conjunctivae normal    Neck:      Thyroid: No thyromegaly  Cardiovascular:      Rate and Rhythm: Normal rate and regular rhythm        Heart sounds: Normal heart sounds  No murmur heard  No friction rub  No gallop  Pulmonary:      Effort: Pulmonary effort is normal  No respiratory distress  Breath sounds: Normal breath sounds  No wheezing  Chest:   Breasts:      Right: No supraclavicular adenopathy  Left: No supraclavicular adenopathy  Abdominal:      General: Bowel sounds are normal  There is no distension  Palpations: Abdomen is soft  Tenderness: There is no abdominal tenderness  Musculoskeletal:         General: No tenderness or deformity  Normal range of motion  Cervical back: Neck supple  Lymphadenopathy:      Head:      Right side of head: No occipital adenopathy  Left side of head: No occipital adenopathy  Upper Body:      Right upper body: No supraclavicular adenopathy  Left upper body: No supraclavicular adenopathy  Skin:     General: Skin is warm  Findings: No erythema or rash  Neurological:      Mental Status: He is alert and oriented to person, place, and time  Cranial Nerves: No cranial nerve deficit  Coordination: Coordination normal    Psychiatric:         Mood and Affect: Mood normal          The history was obtained from the review of the chart, patient      Lab Results:   Lab Results   Component Value Date/Time    Hemoglobin A1C 6 1 (H) 05/16/2022 10:31 AM    Hemoglobin A1C 6 2 (H) 03/15/2022 11:37 AM    Hemoglobin A1C 6 0 (H) 11/29/2021 10:28 AM    WBC 10 39 (H) 05/16/2022 10:31 AM    WBC 10 18 (H) 03/15/2022 11:37 AM    WBC 12 70 (H) 02/07/2022 09:44 AM    Hemoglobin 12 9 05/16/2022 10:31 AM    Hemoglobin 13 4 03/15/2022 11:37 AM    Hemoglobin 13 2 02/07/2022 09:44 AM    Hematocrit 39 9 05/16/2022 10:31 AM    Hematocrit 40 8 03/15/2022 11:37 AM    Hematocrit 41 8 02/07/2022 09:44 AM    MCV 97 05/16/2022 10:31 AM    MCV 94 03/15/2022 11:37 AM    MCV 97 02/07/2022 09:44 AM    Platelets 419 29/27/2782 10:31 AM    Platelets 163 (L) 37/68/2607 11:37 AM    Platelets 826 76/00/3575 09:44 AM    BUN 50 (H) 05/26/2022 10:23 AM    BUN 59 (H) 05/16/2022 10:31 AM    BUN 45 (H) 03/15/2022 11:37 AM    Potassium 5 1 05/26/2022 10:23 AM    Potassium 5 4 (H) 05/16/2022 10:31 AM    Potassium 4 9 03/15/2022 11:37 AM    Chloride 111 (H) 05/26/2022 10:23 AM    Chloride 109 (H) 05/16/2022 10:31 AM    Chloride 109 (H) 03/15/2022 11:37 AM    CO2 18 (L) 05/26/2022 10:23 AM    CO2 25 05/16/2022 10:31 AM    CO2 26 03/15/2022 11:37 AM    Creatinine 2 47 (H) 05/26/2022 10:23 AM    Creatinine 2 87 (H) 05/16/2022 10:31 AM    Creatinine 2 34 (H) 03/15/2022 11:37 AM    AST 13 11/29/2021 10:28 AM    AST 19 08/02/2021 08:25 AM    AST 12 07/07/2021 09:00 AM    ALT 23 11/29/2021 10:28 AM    ALT 29 08/02/2021 08:25 AM    ALT 23 07/07/2021 09:00 AM    Albumin 3 8 02/07/2022 09:44 AM    Albumin 3 6 11/29/2021 10:28 AM    Albumin 3 3 (L) 08/02/2021 08:25 AM    HDL, Direct 64 11/29/2021 10:28 AM    HDL, Direct 66 08/02/2021 08:25 AM    Triglycerides 69 11/29/2021 10:28 AM    Triglycerides 54 08/02/2021 08:25 AM           Imaging Studies: I have personally reviewed pertinent reports  Portions of the record may have been created with voice recognition software  Occasional wrong word or "sound a like" substitutions may have occurred due to the inherent limitations of voice recognition software  Read the chart carefully and recognize, using context, where substitutions have occurred

## 2022-06-07 NOTE — TELEPHONE ENCOUNTER
Pt called and stated he would like to go ahead with being referred to Dr Bolivar Waters   Pt did not have a fax number to send the referral for his office but did have a phone number to the office [phone]

## 2022-06-08 NOTE — TELEPHONE ENCOUNTER
Last OV note faxed to [phone]  Spoke with patient and advised that referral was sent  He does not have an appt with them at this time but stated he was going to call today to set one up

## 2022-06-08 NOTE — TELEPHONE ENCOUNTER
Please make sure my last office note is faxed to Dr Haritha Smart have strongly recommended he see José Antonio Bragg who did his initial surgery   Please keep me posted and lets make sure he has an appt with Les PLUMMER

## 2022-06-20 ENCOUNTER — TELEPHONE (OUTPATIENT)
Dept: OTHER | Facility: OTHER | Age: 82
End: 2022-06-20

## 2022-06-20 DIAGNOSIS — Z71.89 ENCOUNTER FOR OSTOMY CARE EDUCATION: Primary | ICD-10-CM

## 2022-06-20 DIAGNOSIS — D49.4 BLADDER TUMOR: ICD-10-CM

## 2022-06-20 NOTE — TELEPHONE ENCOUNTER
Called Fabiola Dockery back and he is requesting if someone could go to his house and help him with his ostomy  He has had this since 10/2020 He uses a aster appliance  And it keeps leaking  He has used most of supplies and had to pay out of pocket of  Will see if can get someone to assist with his appliance and application if  he can go to  wound care   Patient has follow up next monthe with Dr Beatriz Perez

## 2022-06-27 NOTE — TELEPHONE ENCOUNTER
Referral to outpatient wound care for follow-up with ostomy nurse provided Nsx consulted - No acute neurosurgical intervention is indicated.  CXR: Clear lungs. SWALLOW HX: Bedside swallow evaluation 9/9/15 -> Pt presents with mildly reduced mastication otherwise oral preparatory and oral stages of the swallow are judged to be wfl.  Oral clearing and a-p transfer of the bolus are adequate.  Pharyngeal swallow judged to be timely with adequate laryngeal elevation.  No s/s of laryngeal penetration or aspiration with thin liquids, solids or purees.  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 -> Diet rx: Soft diet.  [facility] [place] -> Patient presents with chris-pharyngeal dysphagia with marked deficits in oral management, significant delay in trigger of the swallow reflex, pharyngeal residue post swallow, and silent laryngeal penetration of the most conservative p.o. textures. -> Diet rx: NPO, with non-oral nutrition/hydration/medications.  5/22: Palliative consulted - plan to discuss ACP with family today.

## 2022-06-29 NOTE — TELEPHONE ENCOUNTER
Spoke with Piper Tomlin and notified her that a referral had been placed for [facility] - would care   They will call to schedule an appointment

## 2022-07-01 ENCOUNTER — LAB (OUTPATIENT)
Dept: LAB | Facility: CLINIC | Age: 82
End: 2022-07-01
Payer: MEDICARE

## 2022-07-01 DIAGNOSIS — R79.89 ELEVATED SERUM CREATININE: ICD-10-CM

## 2022-07-01 DIAGNOSIS — E83.41 HYPERMAGNESEMIA: ICD-10-CM

## 2022-07-01 DIAGNOSIS — E87.20 METABOLIC ACIDOSIS: ICD-10-CM

## 2022-07-01 DIAGNOSIS — N25.81 SECONDARY HYPERPARATHYROIDISM OF RENAL ORIGIN (HCC): ICD-10-CM

## 2022-07-01 DIAGNOSIS — N18.4 BENIGN HYPERTENSION WITH CHRONIC KIDNEY DISEASE, STAGE IV (HCC): ICD-10-CM

## 2022-07-01 DIAGNOSIS — E87.5 HYPERKALEMIA: ICD-10-CM

## 2022-07-01 DIAGNOSIS — I12.9 BENIGN HYPERTENSION WITH CHRONIC KIDNEY DISEASE, STAGE IV (HCC): ICD-10-CM

## 2022-07-01 DIAGNOSIS — N18.4 STAGE 4 CHRONIC KIDNEY DISEASE (HCC): ICD-10-CM

## 2022-07-01 LAB
25(OH)D3 SERPL-MCNC: 42.4 NG/ML (ref 30–100)
AMORPH URATE CRY URNS QL MICRO: ABNORMAL
ANION GAP SERPL CALCULATED.3IONS-SCNC: 8 MMOL/L (ref 4–13)
BACTERIA UR QL AUTO: ABNORMAL /HPF
BILIRUB UR QL STRIP: NEGATIVE
BUN SERPL-MCNC: 57 MG/DL (ref 5–25)
CALCIUM SERPL-MCNC: 9.1 MG/DL (ref 8.3–10.1)
CHLORIDE SERPL-SCNC: 108 MMOL/L (ref 100–108)
CLARITY UR: ABNORMAL
CO2 SERPL-SCNC: 21 MMOL/L (ref 21–32)
COLOR UR: ABNORMAL
CREAT SERPL-MCNC: 2.38 MG/DL (ref 0.6–1.3)
CREAT UR-MCNC: 53 MG/DL
GFR SERPL CREATININE-BSD FRML MDRD: 24 ML/MIN/1.73SQ M
GLUCOSE SERPL-MCNC: 175 MG/DL (ref 65–140)
GLUCOSE UR STRIP-MCNC: NEGATIVE MG/DL
HGB UR QL STRIP.AUTO: NEGATIVE
KETONES UR STRIP-MCNC: NEGATIVE MG/DL
LEUKOCYTE ESTERASE UR QL STRIP: ABNORMAL
MAGNESIUM SERPL-MCNC: 2.2 MG/DL (ref 1.6–2.6)
NITRITE UR QL STRIP: NEGATIVE
NON-SQ EPI CELLS URNS QL MICRO: ABNORMAL /HPF
PH UR STRIP.AUTO: 6.5 [PH]
PHOSPHATE SERPL-MCNC: 3.3 MG/DL (ref 2.3–4.1)
POTASSIUM SERPL-SCNC: 4.5 MMOL/L (ref 3.5–5.3)
PROT UR STRIP-MCNC: ABNORMAL MG/DL
PROT UR-MCNC: 30 MG/DL
PROT/CREAT UR: 0.57 MG/G{CREAT} (ref 0–0.1)
RBC #/AREA URNS AUTO: ABNORMAL /HPF
SODIUM SERPL-SCNC: 137 MMOL/L (ref 136–145)
SP GR UR STRIP.AUTO: 1.01 (ref 1–1.03)
UROBILINOGEN UR STRIP-ACNC: <2 MG/DL
WBC #/AREA URNS AUTO: ABNORMAL /HPF

## 2022-07-01 PROCEDURE — 83735 ASSAY OF MAGNESIUM: CPT

## 2022-07-01 PROCEDURE — 84100 ASSAY OF PHOSPHORUS: CPT

## 2022-07-01 PROCEDURE — 80048 BASIC METABOLIC PNL TOTAL CA: CPT

## 2022-07-01 PROCEDURE — 81001 URINALYSIS AUTO W/SCOPE: CPT

## 2022-07-01 PROCEDURE — 36415 COLL VENOUS BLD VENIPUNCTURE: CPT

## 2022-07-01 PROCEDURE — 82306 VITAMIN D 25 HYDROXY: CPT

## 2022-07-01 PROCEDURE — 84156 ASSAY OF PROTEIN URINE: CPT

## 2022-07-01 PROCEDURE — 82570 ASSAY OF URINE CREATININE: CPT

## 2022-07-05 NOTE — RESULT ENCOUNTER NOTE
Please inform patient that renal function is stable at creatinine 2 3  Electrolytes are stable, magnesium at normal range, has appointment in August with advanced practitioner   will be discussed further during office visit

## 2022-07-06 ENCOUNTER — TELEPHONE (OUTPATIENT)
Dept: NEPHROLOGY | Facility: CLINIC | Age: 82
End: 2022-07-06

## 2022-07-06 NOTE — TELEPHONE ENCOUNTER
Spoke with patient and advised: Please inform patient that renal function is stable at creatinine 2 3   Electrolytes are stable, magnesium at normal range, has appointment in August with advanced practitioner   will be discussed further during office visit       No Further questions at this time

## 2022-07-15 ENCOUNTER — TELEPHONE (OUTPATIENT)
Dept: GASTROENTEROLOGY | Facility: CLINIC | Age: 82
End: 2022-07-15

## 2022-07-15 NOTE — TELEPHONE ENCOUNTER
Patients GI provider:  Dr Darrian Gaviria    Number to return call: [phone]    Reason for call: Pt calling stating he has a upcoming procedure (7/25) and was told he would get diabetic education and has not gotten anything  He also states he accidentally took his dos flea, tick, and worm medication and hes hoping that wont harm anything      Scheduled procedure/appointment date if applicable: procedure 9/54

## 2022-07-15 NOTE — TELEPHONE ENCOUNTER
MG: Spoke with patient  He has followed up with his diabetes doctor, and he is not on insulin  Patient does not have any advise reactions currently from ingesting his dogs medication  Patient will call poison control to follow-up with eating his dogs heart worm medication

## 2022-07-19 ENCOUNTER — APPOINTMENT (OUTPATIENT)
Dept: LAB | Facility: CLINIC | Age: 82
End: 2022-07-19
Payer: MEDICARE

## 2022-07-19 ENCOUNTER — TRANSCRIBE ORDERS (OUTPATIENT)
Dept: LAB | Facility: CLINIC | Age: 82
End: 2022-07-19

## 2022-07-19 DIAGNOSIS — I10 ESSENTIAL HYPERTENSION, MALIGNANT: ICD-10-CM

## 2022-07-19 DIAGNOSIS — E11.69 OBESITY, DIABETES, AND HYPERTENSION SYNDROME (HCC): ICD-10-CM

## 2022-07-19 DIAGNOSIS — E66.9 OBESITY, DIABETES, AND HYPERTENSION SYNDROME (HCC): ICD-10-CM

## 2022-07-19 DIAGNOSIS — I15.2 OBESITY, DIABETES, AND HYPERTENSION SYNDROME (HCC): ICD-10-CM

## 2022-07-19 DIAGNOSIS — E11.59 OBESITY, DIABETES, AND HYPERTENSION SYNDROME (HCC): ICD-10-CM

## 2022-07-19 DIAGNOSIS — I10 ESSENTIAL HYPERTENSION, MALIGNANT: Primary | ICD-10-CM

## 2022-07-19 LAB
ANION GAP SERPL CALCULATED.3IONS-SCNC: 4 MMOL/L (ref 4–13)
BUN SERPL-MCNC: 48 MG/DL (ref 5–25)
CALCIUM SERPL-MCNC: 9.4 MG/DL (ref 8.3–10.1)
CHLORIDE SERPL-SCNC: 118 MMOL/L (ref 96–108)
CO2 SERPL-SCNC: 19 MMOL/L (ref 21–32)
CREAT SERPL-MCNC: 2.25 MG/DL (ref 0.6–1.3)
EST. AVERAGE GLUCOSE BLD GHB EST-MCNC: 126 MG/DL
GFR SERPL CREATININE-BSD FRML MDRD: 26 ML/MIN/1.73SQ M
GLUCOSE P FAST SERPL-MCNC: 145 MG/DL (ref 65–99)
HBA1C MFR BLD: 6 %
POTASSIUM SERPL-SCNC: 5.3 MMOL/L (ref 3.5–5.3)
SODIUM SERPL-SCNC: 141 MMOL/L (ref 135–147)

## 2022-07-19 PROCEDURE — 83036 HEMOGLOBIN GLYCOSYLATED A1C: CPT

## 2022-07-19 PROCEDURE — 80048 BASIC METABOLIC PNL TOTAL CA: CPT

## 2022-07-19 PROCEDURE — 36415 COLL VENOUS BLD VENIPUNCTURE: CPT

## 2022-07-25 ENCOUNTER — ANESTHESIA EVENT (OUTPATIENT)
Dept: GASTROENTEROLOGY | Facility: HOSPITAL | Age: 82
End: 2022-07-25

## 2022-07-25 ENCOUNTER — ANESTHESIA (OUTPATIENT)
Dept: GASTROENTEROLOGY | Facility: HOSPITAL | Age: 82
End: 2022-07-25

## 2022-07-25 ENCOUNTER — HOSPITAL ENCOUNTER (OUTPATIENT)
Dept: GASTROENTEROLOGY | Facility: HOSPITAL | Age: 82
Setting detail: OUTPATIENT SURGERY
Discharge: HOME | End: 2022-07-25
Attending: INTERNAL MEDICINE | Admitting: INTERNAL MEDICINE
Payer: MEDICARE

## 2022-07-25 VITALS
OXYGEN SATURATION: 98 % | SYSTOLIC BLOOD PRESSURE: 100 MMHG | RESPIRATION RATE: 18 BRPM | DIASTOLIC BLOOD PRESSURE: 56 MMHG | TEMPERATURE: 96.9 F | HEART RATE: 54 BPM

## 2022-07-25 DIAGNOSIS — K22.70 BARRETT'S ESOPHAGUS WITH ESOPHAGITIS: ICD-10-CM

## 2022-07-25 DIAGNOSIS — Z12.11 SCREENING FOR COLORECTAL CANCER: ICD-10-CM

## 2022-07-25 DIAGNOSIS — Z12.12 SCREENING FOR COLORECTAL CANCER: ICD-10-CM

## 2022-07-25 DIAGNOSIS — K20.90 BARRETT'S ESOPHAGUS WITH ESOPHAGITIS: ICD-10-CM

## 2022-07-25 LAB — GLUCOSE SERPL-MCNC: 112 MG/DL (ref 65–140)

## 2022-07-25 PROCEDURE — G0121 COLON CA SCRN NOT HI RSK IND: HCPCS | Performed by: INTERNAL MEDICINE

## 2022-07-25 PROCEDURE — 43235 EGD DIAGNOSTIC BRUSH WASH: CPT | Performed by: INTERNAL MEDICINE

## 2022-07-25 PROCEDURE — 82948 REAGENT STRIP/BLOOD GLUCOSE: CPT

## 2022-07-25 RX ORDER — PROPOFOL 10 MG/ML
INJECTION, EMULSION INTRAVENOUS AS NEEDED
Status: DISCONTINUED | OUTPATIENT
Start: 2022-07-25 | End: 2022-07-25

## 2022-07-25 RX ORDER — SODIUM CHLORIDE, SODIUM LACTATE, POTASSIUM CHLORIDE, CALCIUM CHLORIDE 600; 310; 30; 20 MG/100ML; MG/100ML; MG/100ML; MG/100ML
INJECTION, SOLUTION INTRAVENOUS CONTINUOUS PRN
Status: DISCONTINUED | OUTPATIENT
Start: 2022-07-25 | End: 2022-07-25

## 2022-07-25 RX ORDER — LIDOCAINE HYDROCHLORIDE 10 MG/ML
INJECTION, SOLUTION EPIDURAL; INFILTRATION; INTRACAUDAL; PERINEURAL AS NEEDED
Status: DISCONTINUED | OUTPATIENT
Start: 2022-07-25 | End: 2022-07-25

## 2022-07-25 RX ADMIN — PROPOFOL 20 MG: 10 INJECTION, EMULSION INTRAVENOUS at 14:38

## 2022-07-25 RX ADMIN — PROPOFOL 20 MG: 10 INJECTION, EMULSION INTRAVENOUS at 14:42

## 2022-07-25 RX ADMIN — PROPOFOL 20 MG: 10 INJECTION, EMULSION INTRAVENOUS at 14:46

## 2022-07-25 RX ADMIN — PROPOFOL 120 MG: 10 INJECTION, EMULSION INTRAVENOUS at 14:27

## 2022-07-25 RX ADMIN — LIDOCAINE HYDROCHLORIDE 50 MG: 10 INJECTION, SOLUTION EPIDURAL; INFILTRATION; INTRACAUDAL; PERINEURAL at 14:27

## 2022-07-25 RX ADMIN — SODIUM CHLORIDE, SODIUM LACTATE, POTASSIUM CHLORIDE, AND CALCIUM CHLORIDE: .6; .31; .03; .02 INJECTION, SOLUTION INTRAVENOUS at 14:12

## 2022-07-25 RX ADMIN — PROPOFOL 20 MG: 10 INJECTION, EMULSION INTRAVENOUS at 14:31

## 2022-07-25 RX ADMIN — PROPOFOL 40 MG: 10 INJECTION, EMULSION INTRAVENOUS at 14:34

## 2022-07-25 RX ADMIN — PROPOFOL 20 MG: 10 INJECTION, EMULSION INTRAVENOUS at 14:29

## 2022-07-25 NOTE — ANESTHESIA POSTPROCEDURE EVALUATION
Post-Op Assessment Note    CV Status:  Stable    Pain management: adequate     Mental Status:  Sleepy   Hydration Status:  Stable   PONV Controlled:  None   Airway Patency:  Patent      Post Op Vitals Reviewed: Yes      Staff: CRNA         No complications documented      BP  108/59   Temp     Pulse  54   Resp   16   SpO2   99% on RA   Post procedure VS noted above, SV non obstructed

## 2022-07-25 NOTE — ANESTHESIA PREPROCEDURE EVALUATION
Procedure:  COLONOSCOPY  EGD    Relevant Problems   CARDIO   (+) Benign essential hypertension   (+) Coronary artery disease involving native coronary artery of native heart without angina pectoris   (+) Hx of CABG   (+) Hyperlipidemia   (+) RBBB      ENDO   (+) DMII [facility]diabetes mellitus, type 2) [facility]   (+) Secondary hyperparathyroidism of renal origin ([address] )   (+) 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)      /RENAL       (+) Benign hypertension with chronic kidney disease, stage IV (HCC)   (+) Chronic kidney disease-mineral and bone disorder      HEMATOLOGY              MUSCULOSKELETAL   (+) Backache      NEURO/PSYCH   (+) Anxiety and depression   (+) History of bladder cancer      7/2018 Stress EKG  SUMMARY:  -  Stress results: Duration of exercise was 5 min and 31 sec  Target heart rate was achieved  There was resting hypertension with an appropriate blood pressure response to stress  There was no chest pain during stress  -  ECG conclusions: The stress ECG was negative for ischemia      IMPRESSIONS: Normal study after maximal exercise without reproduction of symptoms  Physical Exam    Airway    Mallampati score: II  TM Distance: >3 FB  Neck ROM: full     Dental   No notable dental hx     Cardiovascular  Rhythm: regular, Rate: normal,     Pulmonary  Breath sounds clear to auscultation,     Other Findings      Echo 2019  LEFT VENTRICLE: Size was normal  Systolic function was mildly reduced  Ejection fraction was estimated to be 45 %  There was akinesis of the basal-mid inferior wall(s)  Wall thickness was mildly increased  No evidence of apical thrombus    DOPPLER: Left ventricular diastolic function parameters were normal      RIGHT VENTRICLE: The size was normal  Systolic function was normal  Wall thickness was normal      LEFT ATRIUM: The atrium was mildly dilated      RIGHT ATRIUM: Size was normal      MITRAL VALVE: There was mild annular calcification  Valve structure was normal  There was mild thickening  There was normal leaflet separation  DOPPLER: The transmitral velocity was within the normal range  There was no evidence for  stenosis  There was mild regurgitation      AORTIC VALVE: The valve was trileaflet  Leaflets exhibited normal thickness and normal cuspal separation  DOPPLER: Transaortic velocity was within the normal range  There was no evidence for stenosis  There was no significant  regurgitation      TRICUSPID VALVE: The valve structure was normal  There was normal leaflet separation  DOPPLER: The transtricuspid velocity was within the normal range  There was no evidence for stenosis  There was trace regurgitation      PULMONIC VALVE: Leaflets exhibited normal thickness, no calcification, and normal cuspal separation  DOPPLER: The transpulmonic velocity was within the normal range  There was trace regurgitation      PERICARDIUM: There was no pericardial effusion  The pericardium was normal in appearance      AORTA: The root exhibited normal size      SYSTEMIC VEINS: IVC: The inferior vena cava was normal in size  Anesthesia Plan  ASA Score- 3     Anesthesia Type- IV sedation with anesthesia with ASA Monitors  Additional Monitors:   Airway Plan:           Plan Factors-Exercise tolerance (METS): <4 METS  Exercise comment: 2/2 leg weakness  No CP, no orthopnea, no JVD on exam     Chart reviewed  EKG reviewed  Patient summary reviewed  Patient is not a current smoker  Obstructive sleep apnea risk education given perioperatively  Induction- intravenous  Postoperative Plan-     Informed Consent- Anesthetic plan and risks discussed with patient and spouse  I personally reviewed this patient with the CRNA  Discussed and agreed on the Anesthesia Plan with the CRNA  Nataly Canchola

## 2022-07-25 NOTE — H&P
History and Physical -  [facility]  Sylvia Ramsay 80 y o  male MRN: [id number]    HPI: Sylvia Ramsay is a 80y o  year old male who presents for screening colonoscopy and EGD for surveillance of Wright's esophagus        Review of Systems    Historical Information   Past Medical History:   Diagnosis Date    Acute kidney injury ([facility] )     Arthritis     Hands    Wright esophagus     BPH with obstruction/lower urinary tract symptoms     CAD (coronary artery disease)     Last assessed 09/16/15    Cancer ([place] [address] )     bladder    Cataract, acquired     Last assessed 10/10/17    Chronic kidney disease     Coronary artery disease     Diabetes mellitus ([place] [address] )     NIDDM    Diabetic neuropathy ([place] [address] )     Feet    Enlarged prostate with lower urinary tract symptoms (LUTS)     Last assessed 10/10/17    Erectile dysfunction     GERD (gastroesophageal reflux disease)     Last assessed 10/10/17    Hemoptysis     Last assessed 03/14/16    Hypercholesterolemia     Last assessed 10/10/17    Hypertension     Last assessed 10/10/17    Macular degeneration     Right eye is particularly affected-peripheral vision intact    Myocardial infarction ([place] [address] ) 1998    OAB (overactive bladder)     Testicular hypofunction     Testicular hypogonadism     Last assessed 10/10/17    Tinnitus     Umbilical hernia     Last assessed 06/18/14    Urge incontinence of urine     Wears glasses      Past Surgical History:   Procedure Laterality Date    COLONOSCOPY      CORONARY ARTERY BYPASS GRAFT  07/16/2014    ABG x 4 LIMA to LAD,SVG to diagnoal 2 SVG to OM-1, SVG to PDA, resection of partial plerual mass    CT GUIDED PERC DRAINAGE CATHETER PLACEMENT  2/19/2021    CYSTOSCOPY  2013    CYSTOSCOPY  02/08/2022    [place]    ESOPHAGOGASTRODUODENOSCOPY      FL RETROGRADE PYELOGRAM  12/20/2018    FL RETROGRADE PYELOGRAM  12/5/2019    INGUINAL HERNIA REPAIR Bilateral 2015    IR DRAINAGE TUBE CHECK AND/OR REMOVAL 3/5/2021    PHOTODYNAMIC THERAPY      For Barretts esophagus    FL COLONOSCOPY FLX DX W/COLLJ SPEC WHEN PFRMD N/A 2016    Procedure: COLONOSCOPY;  Surgeon: Aishwarya Veras MD;  Location:  GI LAB; Service: Gastroenterology    FL CYSTOURETHROSCOPY,BIOPSY N/A 9/10/2020    Procedure: CYSTOSCOPY, COLLECTION OF LEFT KIDNEY CYTOLOGY, BILATERAL RETROGRADE PYELOGRAM, BLADDER WALL BIOPSIES  AND Genet An, RANDOM BLADDER TUMOR BIOPSIES;  Surgeon: Patrice Gerardo MD;  Location: [facility] MAIN OR;  Service: Urology    FL CYSTOURETHROSCOPY,BIOPSY N/A 2022    Procedure: urethroscopy , biopsy of urethra with fulgeration;  Surgeon: Mayela Myrick MD;  Location:  MAIN OR;  Service: Urology    FL CYSTOURETHROSCOPY,FULGUR 2-5 CM LESN N/A 2020    Procedure: CYSTOSCOPY, TRANSURETHRAL RESECTION OF BLADDER TUMOR (TURBT);   Surgeon: Patrice Gerardo MD;  Location: [facility] Main [facility];  Service: Urology    FL CYSTOURETHROSCOPY,FULGUR <0 5 CM LESN N/A 2019    Procedure: CYSTO W/TURBT AND TRANSURETHRAL PROSTATE BIOPSY AND OPENING OF BLADDER NECK CONTRACTURE, B/L Retrograde pyelogram;  Surgeon: Patrice Gerardo MD;  Location: [facility];  Service: Urology    TONSILLECTOMY      TRANSURETHRAL RESECTION OF PROSTATE N/A 2018    Procedure: CYSTO, PHOTO SELECTIVE VAPORIZATION OF PROSTATE, B/L RETROGRADE PYELOGRAM, TURBT;  Surgeon: Patrice Gerardo MD;  Location: [facility];  Service: Urology    UMBILICAL HERNIA REPAIR      UPPER GASTROINTESTINAL ENDOSCOPY       Social History   Social History     Substance and Sexual Activity   Alcohol Use Not Currently    Alcohol/week: 2 0 standard drinks    Types: 1 Glasses of wine, 1 Cans of beer per week     Social History     Substance and Sexual Activity   Drug Use Never     Social History     Tobacco Use   Smoking Status Former Smoker    Packs/day:     Years:     Pack years:     Types: Cigarettes    Quit date: 0    Years since quittin 5 Smokeless Tobacco Never Used     Family History   Problem Relation Age of Onset   [facility] Cancer Mother     Other Mother         Digestive System Complications    Diabetes Father     Heart disease Father     Hypertension Father     Coronary artery disease Father     Hyperlipidemia Father        Meds/Allergies     (Not in a hospital admission)      Allergies   Allergen Reactions    Fentanyl Hallucinations    Morphine And Related Other (See Comments)     While having an MI patient received morphine and had adverse reaction but doesn't know what happened         Objective     BP (!) 204/93   Pulse 60   Temp (!) 97 1 °F (36 2 °C) (Tympanic)   Resp 18   SpO2 100%       PHYSICAL EXAM    Gen: NAD  CV: RRR  CHEST: Clear  ABD: soft, NT/ND  EXT: no edema  Neuro: AAO      ASSESSMENT/PLAN:  This is a 80y o  year old male here for EGD and colonoscopy for evaluation of change in bowel habit, Wright's esophagus, colon cancer screening    PLAN:   Procedure:  EGD and colonoscopy with biopsy and possible polypectomy

## 2022-07-28 ENCOUNTER — OFFICE VISIT (OUTPATIENT)
Dept: INTERNAL MEDICINE CLINIC | Facility: CLINIC | Age: 82
End: 2022-07-28
Payer: MEDICARE

## 2022-07-28 VITALS
TEMPERATURE: 98 F | HEART RATE: 51 BPM | DIASTOLIC BLOOD PRESSURE: 76 MMHG | BODY MASS INDEX: 27.58 KG/M2 | WEIGHT: 182 LBS | HEIGHT: 68 IN | OXYGEN SATURATION: 97 % | RESPIRATION RATE: 18 BRPM | SYSTOLIC BLOOD PRESSURE: 134 MMHG

## 2022-07-28 DIAGNOSIS — I12.9 BENIGN HYPERTENSION WITH CHRONIC KIDNEY DISEASE, STAGE IV (HCC): ICD-10-CM

## 2022-07-28 DIAGNOSIS — D49.59 URETHRAL TUMOR: ICD-10-CM

## 2022-07-28 DIAGNOSIS — N18.4 BENIGN HYPERTENSION WITH CHRONIC KIDNEY DISEASE, STAGE IV (HCC): ICD-10-CM

## 2022-07-28 DIAGNOSIS — K22.70 BARRETT'S ESOPHAGUS WITH ESOPHAGITIS: ICD-10-CM

## 2022-07-28 DIAGNOSIS — E11.59 TYPE 2 DIABETES MELLITUS WITH OTHER CIRCULATORY COMPLICATION, WITHOUT LONG-TERM CURRENT USE OF INSULIN (HCC): ICD-10-CM

## 2022-07-28 DIAGNOSIS — K20.90 BARRETT'S ESOPHAGUS WITH ESOPHAGITIS: ICD-10-CM

## 2022-07-28 DIAGNOSIS — Z00.00 HEALTHCARE MAINTENANCE: Primary | ICD-10-CM

## 2022-07-28 PROCEDURE — 99214 OFFICE O/P EST MOD 30 MIN: CPT | Performed by: INTERNAL MEDICINE

## 2022-07-28 NOTE — ASSESSMENT & PLAN NOTE
Patient will be due for repeat Medicare wellness visit when he returns to the office in 4 months  He was given a slip for complete labs to be performed prior to the visit  Patient was told in the interim if any new medical problems or concerns to please call

## 2022-07-28 NOTE — PROGRESS NOTES
Assessment/Plan:    DMII (diabetes mellitus, type 2) ([facility] )  Patient does have a longstanding history of diabetes mellitus type 2  As noted sugar showing excellent control with hemoglobin A1c of 6 0  Patient states that he tries to watch his dietary intake but he is not always perfect  He is taking medication as directed and continues with medication as previously  He has supplies in order to follow blood sugar readings at home and knows to call if any either difficulty with hypo or hypertension  Diabetic foot exam was performed today  Patient is up-to-date with eye exam   Longstanding history of peripheral neuropathy which was actually presenting complaint with initial visit in our office  Lab Results   Component Value Date    HGBA1C 6 0 (H) 07/19/2022       Urethral tumor  Patient has had complete workup and evaluation by Urology  Patient is scheduled resection of tumor with his physicians and surgeons at the [facility] in [place] in the future  Wright's esophagus with esophagitis  Patient has history in the past of Wright's esophagus with esophagitis  Patient recently underwent an EGD and colonoscopy with no evidence of disease  Patient will continue on omeprazole    Healthcare maintenance  Patient will be due for repeat Medicare wellness visit when he returns to the office in 4 months  He was given a slip for complete labs to be performed prior to the visit  Patient was told in the interim if any new medical problems or concerns to please call  Benign essential hypertension  A longstanding history of hypertension  He continues to have adequate control with present treatment and as noted his blood pressure can be variable  Will continue present medication and surveillance including his renal function      Benign hypertension with chronic kidney disease, stage IV [facility])  Lab Results   Component Value Date    EGFR 26 07/19/2022    EGFR 24 07/01/2022    EGFR 23 05/26/2022 CREATININE 2 25 (H) 07/19/2022    CREATININE 2 38 (H) 07/01/2022    CREATININE 2 47 (H) 05/26/2022   Chronic kidney disease stage 4  Continues to follow-up with nephrologist and the kidney function has been stable  Diagnoses and all orders for this visit:    Healthcare maintenance  -     Comprehensive metabolic panel; Future  -     CBC and differential; Future  -     Lipid panel; Future  -     Hemoglobin A1C; Future  -     Microalbumin / creatinine urine ratio    Type 2 diabetes mellitus with other circulatory complication, without long-term current use of insulin (HCC)  -     Hemoglobin A1C; Future  -     Microalbumin / creatinine urine ratio    Urethral tumor    Wright's esophagus with esophagitis    Benign hypertension with chronic kidney disease, stage IV (HCC)          Subjective:      Patient ID: Mike Starkey is a 80 y o  male  The patient is is a an 66-year-old male history of medical problems as outlined previously who is here today for routine follow-up  He did have labs performed prior to the visit today we did discuss the results  Patient also recently underwent both an EGD and colonoscopy with no new lesions found  Patient is undergoing evaluation and 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 in [place]  Patient states in general doing relatively well but both he and his wife recently moved to a new house so he has been physically more active    Has had some episodic falls but no severe injury      The following portions of the patient's history were reviewed and updated as appropriate:   He  has a past medical history of Acute kidney injury ([address] ), Arthritis, Wright esophagus, BPH with obstruction/lower urinary tract symptoms, CAD (coronary artery disease), Cancer ([address] ), Cataract, acquired, Chronic kidney disease, Coronary artery disease, Diabetes mellitus ([address] ), Diabetic neuropathy ([address] ), Enlarged prostate with lower urinary tract symptoms (LUTS), Erectile dysfunction, GERD (gastroesophageal reflux disease), Hemoptysis, Hypercholesterolemia, Hypertension, Macular degeneration, Myocardial infarction ([address] ) (1998), OAB (overactive bladder), Testicular hypofunction, Testicular hypogonadism, Tinnitus, Umbilical hernia, Urge incontinence of urine, and Wears glasses    He   Patient Active Problem List    Diagnosis Date Noted    Secondary hyperparathyroidism of renal origin ([address] ) 05/19/2022    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    Functional diarrhea 03/25/2022    Platelets decreased ([address] ) 03/25/2022    Visual hallucinations 11/23/2021    Chronic kidney disease-mineral and bone disorder 10/24/2021    Benign hypertension with chronic kidney disease, stage IV ([address] ) 07/19/2021    Persistent proteinuria 07/19/2021    Anemia 07/19/2021    RBBB 05/07/2021    Hypomagnesemia 86/01/5104    Metabolic acidosis 17/89/0415    Severe protein-calorie malnutrition ([address] ) 02/19/2021    Sepsis [facility] ) 02/19/2021    Right sided Pelvic abscess in male [facility]) 02/18/2021    Ambulatory dysfunction 02/16/2021    Frequent falls 02/16/2021    Anxiety and depression 12/22/2020    Overweight 12/22/2020    Fungal dermatitis 12/03/2020    Forearm mass, left 09/03/2020    Elevated troponin 07/21/2020    Liver function study, abnormal 06/25/2020    Malignant neoplasm of urinary bladder neck ([facility] ) 03/24/2020    Positive urinary cytology 11/05/2019    Coronary artery disease involving native coronary artery of native heart without angina pectoris 09/09/2019    Ischemic cardiomyopathy 09/09/2019    History of bladder cancer 07/30/2019    Healthcare maintenance 05/23/2019    Closed fracture of upper end of right fibula 03/11/2019    Malignant neoplasm of overlapping sites of bladder ([address] ) 01/10/2019    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/2019    Type 2 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 ([address] ) 12/05/2018    Bladder tumor 11/01/2018    Overactive bladder 10/10/2018    Acute kidney injury ([facility] ) 05/30/2018    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    Backache 10/21/2013    Benign essential hypertension 10/11/2012    DMII (diabetes mellitus, type 2) ([facility] ) 10/11/2012    Hyperlipidemia 10/11/2012     He  has a past surgical history that includes pr colonoscopy flx dx w/collj spec when pfrmd (N/A, 4/25/2016); Coronary artery bypass graft (07/16/2014); Colonoscopy; Inguinal hernia repair (Bilateral, 2015); Umbilical hernia repair (2012); Esophagogastroduodenoscopy; Tonsillectomy; Photodynamic Therapy; Transurethral resection of prostate (N/A, 12/20/2018); FL retrograde pyelogram (12/20/2018); pr cystourethroscopy,fulgur <0 5 cm lesn (N/A, 12/5/2019); FL retrograde pyelogram (12/5/2019); pr cystourethroscopy,fulgur 2-5 cm lesn (N/A, 4/16/2020); Upper gastrointestinal endoscopy; pr cystourethroscopy,biopsy (N/A, 9/10/2020); CT guided perc drainage catheter placeme (2/19/2021); IR drainage tube check and/or removal (3/5/2021); Cystoscopy (2013); Cystoscopy (02/08/2022); and pr cystourethroscopy,biopsy (N/A, 4/5/2022)  His family history includes Cancer in his mother; Coronary artery disease in his father; Diabetes in his father; Heart disease in his father; Hyperlipidemia in his father; Hypertension in his father; Other in his mother  He  reports that he quit smoking about 50 years ago  His smoking use included cigarettes  He has a 13 00 pack-year smoking history  He has never used smokeless tobacco  He reports previous alcohol use of about 2 0 standard drinks of alcohol per week  He reports that he does not use drugs    Current Outpatient Medications   Medication Sig Dispense Refill    acetaminophen (TYLENOL) 500 mg tablet Take 1,000 mg by mouth every 6 (six) hours as needed for mild pain or moderate pain       atorvastatin (LIPITOR) 40 mg tablet TAKE ONE TABLET BY MOUTH AT BEDTIME 90 tablet 3    Blood Glucose Monitoring Suppl (OneTouch Verio Flex System) w/Device KIT USE TO TEST BLOOD GLUCOSE DAILY 1 kit 0    Cholecalciferol (VITAMIN D) 50 MCG (2000 UT) tablet Take 2,000 Units by mouth daily      citalopram (CeleXA) 20 mg tablet TAKE ONE TABLET BY MOUTH EVERY DAY 30 tablet 3    Farxiga 5 MG TABS TAKE ONE TABLET BY MOUTH EVERY DAY 30 tablet 2    ferrous sulfate 324 (65 Fe) mg Take 1 tablet (324 mg total) by mouth daily 30 tablet 5    fluticasone (FLONASE) 50 mcg/act nasal spray 2 sprays into each nostril daily (Patient taking differently: 2 sprays into each nostril as needed) 16 g 3    Lancets (OneTouch Delica Plus [id number]) MISC TEST BLOOD GLUCOSE ONCE DAILY 100 each 0    magnesium oxide (MAG-OX) 400 mg Take 1 tablet (400 mg total) by mouth in the morning  180 tablet 2    metoprolol tartrate (LOPRESSOR) 50 mg tablet Take 1 tablet (50 mg total) by mouth 2 (two) times a day 180 tablet 3    Multiple Vitamins-Minerals (OCUVITE-LUTEIN PO) Take 1 tablet by mouth 2 (two) times a day Pt is taking preservision       nystatin (MYCOSTATIN) powder Apply topically 3 (three) times a day (Patient taking differently: Apply topically as needed) 30 g 3    omeprazole (PriLOSEC) 40 MG capsule Take 1 capsule (40 mg total) by mouth in the morning  90 capsule 3    OneTouch Verio test strip TEST ONCE A  strip 0    sodium bicarbonate 650 mg tablet Take 2 tablets (1,300 mg total) by mouth 2 (two) times a day 360 tablet 3    vitamin E, tocopherol, 400 units capsule Take 400 Units by mouth daily      traMADol (ULTRAM) 50 mg tablet TAKE 1 TABLET BY MOUTH EVERY 6 HOURS AS NEEDED FOR MODERATE PAIN (Patient not taking: No sig reported) 20 tablet 0     No current facility-administered medications for this visit       Current Outpatient Medications on File Prior to Visit   Medication Sig    acetaminophen (TYLENOL) 500 mg tablet Take 1,000 mg by mouth every 6 (six) hours as needed for mild pain or moderate pain     atorvastatin (LIPITOR) 40 mg tablet TAKE ONE TABLET BY MOUTH AT BEDTIME    Blood Glucose Monitoring Suppl (OneTouch Verio Flex System) w/Device KIT USE TO TEST BLOOD GLUCOSE DAILY    Cholecalciferol (VITAMIN D) 50 MCG (2000 UT) tablet Take 2,000 Units by mouth daily    citalopram (CeleXA) 20 mg tablet TAKE ONE TABLET BY MOUTH EVERY DAY    Farxiga 5 MG TABS TAKE ONE TABLET BY MOUTH EVERY DAY    ferrous sulfate 324 (65 Fe) mg Take 1 tablet (324 mg total) by mouth daily    fluticasone (FLONASE) 50 mcg/act nasal spray 2 sprays into each nostril daily (Patient taking differently: 2 sprays into each nostril as needed)    Lancets (OneTouch Delica Plus [id number]) MISC TEST BLOOD GLUCOSE ONCE DAILY    magnesium oxide (MAG-OX) 400 mg Take 1 tablet (400 mg total) by mouth in the morning   metoprolol tartrate (LOPRESSOR) 50 mg tablet Take 1 tablet (50 mg total) by mouth 2 (two) times a day    Multiple Vitamins-Minerals (OCUVITE-LUTEIN PO) Take 1 tablet by mouth 2 (two) times a day Pt is taking preservision     nystatin (MYCOSTATIN) powder Apply topically 3 (three) times a day (Patient taking differently: Apply topically as needed)    omeprazole (PriLOSEC) 40 MG capsule Take 1 capsule (40 mg total) by mouth in the morning   OneTouch Verio test strip TEST ONCE A DAY    sodium bicarbonate 650 mg tablet Take 2 tablets (1,300 mg total) by mouth 2 (two) times a day    vitamin E, tocopherol, 400 units capsule Take 400 Units by mouth daily    traMADol (ULTRAM) 50 mg tablet TAKE 1 TABLET BY MOUTH EVERY 6 HOURS AS NEEDED FOR MODERATE PAIN (Patient not taking: No sig reported)     No current facility-administered medications on file prior to visit  He is allergic to fentanyl and morphine and related       Review of Systems   Constitutional: Negative  HENT: Negative  Eyes: Negative  Respiratory: Negative  Cardiovascular: Negative  Gastrointestinal: Negative  Endocrine: Negative  Genitourinary: Negative  Musculoskeletal: Negative  Diffuse arthralgias but nothing new   Skin: Negative  Allergic/Immunologic: Negative  Neurological: Positive for weakness ( admits to some weakness to lower extremities but patient states this has not increased) and numbness (Peripheral neuropathy)  Negative for dizziness, tremors, seizures, syncope, facial asymmetry, speech difficulty, light-headedness and headaches  Hematological: Negative  Psychiatric/Behavioral: Negative  Objective:      /76 (BP Location: Left arm, Patient Position: Sitting, Cuff Size: Adult)   Pulse (!) 51   Temp 98 °F (36 7 °C) (Tympanic)   Resp 18   Ht 5' 8" (1 727 m)   Wt 82 6 kg (182 lb)   SpO2 97%   BMI 27 67 kg/m²          Physical Exam  Vitals and nursing note reviewed  Constitutional:       General: He is not in acute distress  Appearance: Normal appearance  He is not ill-appearing, toxic-appearing or diaphoretic  Comments: Pleasant, overweight 44-year-old male who is awake alert in no acute distress oriented x3, truncal obesity   HENT:      Head: Normocephalic and atraumatic  Right Ear: Tympanic membrane, ear canal and external ear normal  There is no impacted cerumen  Left Ear: Tympanic membrane, ear canal and external ear normal  There is no impacted cerumen  Nose: Nose normal  No congestion or rhinorrhea  Mouth/Throat:      Mouth: Mucous membranes are moist       Pharynx: Oropharynx is clear  No oropharyngeal exudate or posterior oropharyngeal erythema  Eyes:      General: No scleral icterus  Left eye: No discharge  Extraocular Movements: Extraocular movements intact  Conjunctiva/sclera: Conjunctivae normal       Pupils: Pupils are equal, round, and reactive to light  Neck:      Vascular: No carotid bruit  Cardiovascular:      Rate and Rhythm: Normal rate and regular rhythm  Pulses: Normal pulses     Pulmonary:      Effort: Pulmonary effort is normal  No respiratory distress  Breath sounds: Normal breath sounds  No stridor  No wheezing, rhonchi or rales  Chest:      Chest wall: No tenderness  Abdominal:      General: Bowel sounds are normal  There is no distension  Palpations: Abdomen is soft  There is no mass  Tenderness: There is no abdominal tenderness  There is no right CVA tenderness, left CVA tenderness, guarding or rebound  Hernia: No hernia is present  Comments: Urostomy tube and bag in place   Musculoskeletal:         General: Deformity (Some diffuse degenerative changes but nothing acute  Generalized wasting to musculature upper lower extremities  Bowlegged and slightly off balance with gait) present  No swelling, tenderness or signs of injury  Cervical back: Normal range of motion and neck supple  No rigidity or tenderness  Right lower leg: No edema  Left lower leg: No edema  Lymphadenopathy:      Cervical: No cervical adenopathy  Skin:     General: Skin is warm and dry  Coloration: Skin is not jaundiced or pale  Findings: No bruising, erythema, lesion or rash  Neurological:      Mental Status: He is alert and oriented to person, place, and time  Mental status is at baseline  Cranial Nerves: No cranial nerve deficit  Sensory: Sensory deficit present  Motor: No weakness  Coordination: Coordination abnormal ( some difficulties with coordination with ambulation  States he is using his cane but not today)  Gait: Gait abnormal       Deep Tendon Reflexes: Reflexes abnormal (Severely decreased patella and Achilles tendon reflexes bilaterally)  Psychiatric:         Mood and Affect: Mood normal          Thought Content:  Thought content normal          Judgment: Judgment normal

## 2022-07-28 NOTE — ASSESSMENT & PLAN NOTE
Patient has history in the past of Wright's esophagus with esophagitis  Patient recently underwent an EGD and colonoscopy with no evidence of disease    Patient will continue on omeprazole

## 2022-07-28 NOTE — ASSESSMENT & PLAN NOTE
A longstanding history of hypertension  He continues to have adequate control with present treatment and as noted his blood pressure can be variable  Will continue present medication and surveillance including his renal function

## 2022-07-28 NOTE — ASSESSMENT & PLAN NOTE
Patient has had complete workup and evaluation by Urology  Patient is scheduled resection of tumor with his physicians and surgeons at the [facility] in [place] in the future

## 2022-07-28 NOTE — PROGRESS NOTES
Diabetic Foot Exam    Patient's shoes and socks removed  Right Foot/Ankle   Right Foot Inspection  Skin Exam: skin normal and skin intact  No dry skin, no warmth, no callus, no erythema, no maceration, no abnormal color, no pre-ulcer, no ulcer and no callus  Toe Exam: ROM and strength within normal limits  No swelling, no tenderness, erythema and  no right toe deformity    Sensory   Vibration: absent  Proprioception: diminished  Monofilament testing: diminished    Vascular  Capillary refills: < 3 seconds  The right DP pulse is 1+  The right PT pulse is 1+  Left Foot/Ankle  Left Foot Inspection  Skin Exam: skin normal and skin intact  No dry skin, no warmth, no erythema, no maceration, normal color, no pre-ulcer, no ulcer and no callus  Toe Exam: ROM and strength within normal limits  No swelling, no tenderness, no erythema and no left toe deformity  Sensory   Vibration: diminished  Proprioception: diminished  Monofilament testing: diminished    Vascular  Capillary refills: < 3 seconds  The left DP pulse is 1+  The left PT pulse is 1+       Assign Risk Category  No deformity present  Loss of protective sensation  No weak pulses  Risk: 1

## 2022-07-28 NOTE — ASSESSMENT & PLAN NOTE
Patient does have a longstanding history of diabetes mellitus type 2  As noted sugar showing excellent control with hemoglobin A1c of 6 0  Patient states that he tries to watch his dietary intake but he is not always perfect  He is taking medication as directed and continues with medication as previously  He has supplies in order to follow blood sugar readings at home and knows to call if any either difficulty with hypo or hypertension  Diabetic foot exam was performed today    Patient is up-to-date with eye exam   Longstanding history of peripheral neuropathy which was actually presenting complaint with initial visit in our office  Lab Results   Component Value Date    HGBA1C 6 0 (H) 07/19/2022

## 2022-07-28 NOTE — ASSESSMENT & PLAN NOTE
Lab Results   Component Value Date    EGFR 26 07/19/2022    EGFR 24 07/01/2022    EGFR 23 05/26/2022    CREATININE 2 25 (H) 07/19/2022    CREATININE 2 38 (H) 07/01/2022    CREATININE 2 47 (H) 05/26/2022   Chronic kidney disease stage 4  Continues to follow-up with nephrologist and the kidney function has been stable

## 2022-08-10 ENCOUNTER — TELEPHONE (OUTPATIENT)
Dept: NEPHROLOGY | Facility: CLINIC | Age: 82
End: 2022-08-10

## 2022-08-10 PROBLEM — N18.4 STAGE 4 CHRONIC KIDNEY DISEASE (HCC): Status: ACTIVE | Noted: 2022-08-10

## 2022-08-10 NOTE — PATIENT INSTRUCTIONS
All questions asked and answered  The patient has been instructed to call office at [phone] with any questions or concerns  Please obtain prescribed blood work and urine studies prior to next appointment   Avoid NSAID products which include:  Motrin, Advil, Ibuprofen, Aleve, Naprosyn, Naproxen, Mobic or Celebrex    Low-sodium diet  Return in three months with updated blood work and urine studies  Okay to take stool softener

## 2022-08-10 NOTE — TELEPHONE ENCOUNTER
Appointment Confirmation   Person confirmed appointment with  If not patient, name of the person Voice msg    Date and time of appointment 8/11  11:00    Patient acknowledged and will be at appointment? no    Did you advise the patient that they will need a urine sample if they are a new patient?  N/A    Did you advise the patient to bring their current medications for verification? (including any OTC) Yes    Additional Information

## 2022-08-10 NOTE — PROGRESS NOTES
OFFICE FOLLOW UP - Nephrology   [facility] 80 [facility]  [facility] MRN: [id number]               ASSESSMENT and PLAN:  Mercedes Ochoa was seen today for follow-up and chronic kidney disease  Diagnoses and all orders for this visit:    Benign hypertension with chronic kidney disease, stage IV (HCC)    Stage 4 chronic kidney disease (HCC)    DMII (diabetes mellitus, type 2) ([facility] )    Secondary hyperparathyroidism of renal origin ([facility] )    Bladder tumor    Chronic kidney disease-mineral and bone disorder    Metabolic acidosis    Persistent proteinuria    Anemia of chronic renal failure, unspecified CKD stage  -     Iron Panel (Includes Ferritin, Iron Sat%, Iron, and TIBC);  Future        Chronic kidney disease IV:  Assessment and Plan:  Etiology:  Suspect secondary to diabetes, hypertension with decreased EF of 45%, age related nephron loss, and prior KO  · after review medical records through [facility] or care everywhere recent baseline creatinine 2 0-2 4   · Most recent creatinine increased and now 2 25 on 7/19/2022  · Completed the Kidney Smart Class for CKD education x 2  · Not on ACE/ARB  · Avoid NSAIDS  · Remains on Farxiga  · Return in 3 months with updated blood work and urine studies     Persistent proteinuria:  Assessment and Plan:  · Current UPCR 0 57<1 26-has improved  · Suspect secondary to  hypertensive nephrosclerosis and diabetic nephropathy  · previously on irbesartan and stopped during episode of acute kidney injury in February 2021  · Avoiding ARB/ACE in the setting of worsening CKD  · Continue to monitor        Hypertension:  Assessment and Plan:  · Current BP elevated on arrival  · Currently on metoprolol 50 mg 2 times daily  · no change in current medications  · encourage low-sodium diet      Metabolic Acidosis:  Assessment and Plan:  · In the setting of advanced kidney disease  · Currently on sodium bicarbonate 1300 mg BID  · Current bicarb 19  · Continue sodium bicarbonate tablets     Anemia in the setting of Chronic Kidney Disease  · Current hgb 12 9  · Previous low iron stores  · On oral iron tablet   · will repeat CBC and iron stores with next office visit     Bone Mineral Disease of CKD  Assessment and Plan:  · Will check PTH/phos and vit D with next office visit     Hx of Bladder cancer  Assessment and Plan:   · S/P BCG and Keytruda   Status post radical cysto prostatectomy with 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  · s/p drainage of anterior wall abscess  · Follows Urology as outpatient      History of right pelvic abscess  Assessment and plan:  · status post IR guided drainage and drain placement and removal on 03/05/2021  · S/P antibiotics coure      Age related screening: Your primary caregiver may do yearly screening for colorectal cancer  It is recommended in all men and women over 48years old  You may have screening earlier if you have colon disease or a family history of colorectal cancer  Patient Instructions    All questions asked and answered  The patient has been instructed to call office at [phone] with any questions or concerns   Please obtain prescribed blood work and urine studies prior to next appointment    Avoid NSAID products which include: Motrin, Advil, Ibuprofen, Aleve, Naprosyn, Naproxen, Mobic or Celebrex     Low-sodium diet   Return in three months with updated blood work and urine studies   Okay to take stool softener           HPI:    I had the pleasure of seeing Idalia Hamm today in the renal clinic for the continued management of CKD  He was last seen on 05/19/2022 with Dr Nakul Bullock and noted elevation in creatinine  Repeat BMP does review improved creatinine last 12 25 on 07/19/2022  Since the last visit, there has been no ER visits or hospitilizations  Patient has no complaints at this time and is feeling well  Patient denies any chest pain, shortness of breath or swelling   The last blood work was done on 7/19/2022, which we have reviewed together  ROS:   Review of Systems   Constitutional: Negative  Negative for activity change, appetite change, chills, diaphoresis, fatigue and fever  HENT: Negative  Negative for congestion and facial swelling  Respiratory: Negative  Cardiovascular: Negative  Gastrointestinal: Positive for constipation  Can get constipation at times   Endocrine: Negative  Genitourinary: Negative  Musculoskeletal: Negative  Skin: Negative  Allergic/Immunologic: Negative  Neurological: Negative  Hematological: Negative  Psychiatric/Behavioral: Negative  All other systems reviewed and are negative         Allergies: Fentanyl and Morphine and related    Medications:   Current Outpatient Medications:     atorvastatin (LIPITOR) 40 mg tablet, TAKE ONE TABLET BY MOUTH AT BEDTIME, Disp: 90 tablet, Rfl: 3    Blood Glucose Monitoring Suppl (OneTouch Verio Flex System) w/Device KIT, USE TO TEST BLOOD GLUCOSE DAILY, Disp: 1 kit, Rfl: 0    Cholecalciferol (VITAMIN D) 50 MCG (2000 UT) tablet, Take 2,000 Units by mouth daily, Disp: , Rfl:     citalopram (CeleXA) 20 mg tablet, TAKE ONE TABLET BY MOUTH EVERY DAY, Disp: 30 tablet, Rfl: 3    Farxiga 5 MG TABS, TAKE ONE TABLET BY MOUTH EVERY DAY, Disp: 30 tablet, Rfl: 2    ferrous sulfate 324 (65 Fe) mg, Take 1 tablet (324 mg total) by mouth daily, Disp: 30 tablet, Rfl: 5    fluticasone (FLONASE) 50 mcg/act nasal spray, 2 sprays into each nostril daily (Patient taking differently: 2 sprays into each nostril as needed), Disp: 16 g, Rfl: 3    Lancets (OneTouch Delica Plus [id number]) MISC, TEST BLOOD GLUCOSE ONCE DAILY, Disp: 100 each, Rfl: 0    magnesium oxide (MAG-OX) 400 mg, Take 1 tablet (400 mg total) by mouth in the morning , Disp: 180 tablet, Rfl: 2    metoprolol tartrate (LOPRESSOR) 50 mg tablet, Take 1 tablet (50 mg total) by mouth 2 (two) times a day, Disp: 180 tablet, Rfl: 3    Multiple Vitamins-Minerals (OCUVITE-LUTEIN PO), Take 1 tablet by mouth 2 (two) times a day Pt is taking preservision , Disp: , Rfl:     nystatin (MYCOSTATIN) powder, Apply topically 3 (three) times a day (Patient taking differently: Apply topically as needed), Disp: 30 g, Rfl: 3    omeprazole (PriLOSEC) 40 MG capsule, Take 1 capsule (40 mg total) by mouth in the morning , Disp: 90 capsule, Rfl: 3    OneTouch Verio test strip, TEST ONCE A DAY, Disp: 100 strip, Rfl: 0    sodium bicarbonate 650 mg tablet, Take 2 tablets (1,300 mg total) by mouth 2 (two) times a day, Disp: 360 tablet, Rfl: 3    vitamin E, tocopherol, 400 units capsule, Take 400 Units by mouth daily, Disp: , Rfl:     acetaminophen (TYLENOL) 500 mg tablet, Take 1,000 mg by mouth every 6 (six) hours as needed for mild pain or moderate pain  (Patient not taking: Reported on 8/11/2022), Disp: , Rfl:     traMADol (ULTRAM) 50 mg tablet, TAKE 1 TABLET BY MOUTH EVERY 6 HOURS AS NEEDED FOR MODERATE PAIN (Patient not taking: No sig reported), Disp: 20 tablet, Rfl: 0    Past Medical History:   Diagnosis Date    Acute kidney injury ([address] )     Arthritis     Hands    Wright esophagus     BPH with obstruction/lower urinary tract symptoms     CAD (coronary artery disease)     Last assessed 09/16/15    Cancer ([address] )     bladder    Cataract, acquired     Last assessed 10/10/17    Chronic kidney disease     Coronary artery disease     Diabetes mellitus ([address] )     NIDDM    Diabetic neuropathy (HCC)     Feet    Enlarged prostate with lower urinary tract symptoms (LUTS)     Last assessed 10/10/17    Erectile dysfunction     GERD (gastroesophageal reflux disease)     Last assessed 10/10/17    Hemoptysis     Last assessed 03/14/16    Hypercholesterolemia     Last assessed 10/10/17    Hypertension     Last assessed 10/10/17    Macular degeneration     Right eye is particularly affected-peripheral vision intact    Myocardial infarction ([address] ) 1998    OAB (overactive bladder)     Testicular hypofunction     Testicular hypogonadism     Last assessed 10/10/17    Tinnitus     Umbilical hernia     Last assessed 06/18/14    Urge incontinence of urine     Wears glasses      Past Surgical History:   Procedure Laterality Date    COLONOSCOPY      CORONARY ARTERY BYPASS GRAFT  07/16/2014    ABG x 4 LIMA to LAD,SVG to diagnoal 2 SVG to OM-1, SVG to PDA, resection of partial plerual mass    CT GUIDED PERC DRAINAGE CATHETER PLACEMENT  2/19/2021    CYSTOSCOPY  2013    CYSTOSCOPY  02/08/2022    [place]    ESOPHAGOGASTRODUODENOSCOPY      FL RETROGRADE PYELOGRAM  12/20/2018    FL RETROGRADE PYELOGRAM  12/5/2019    INGUINAL HERNIA REPAIR Bilateral 2015    IR DRAINAGE TUBE CHECK AND/OR REMOVAL  3/5/2021    PHOTODYNAMIC THERAPY      For Barretts esophagus    MD COLONOSCOPY FLX DX W/COLLJ SPEC WHEN PFRMD N/A 4/25/2016    Procedure: COLONOSCOPY;  Surgeon: Shelley Krishnan MD;  Location:  GI LAB; Service: Gastroenterology    MD CYSTOURETHROSCOPY,BIOPSY N/A 9/10/2020    Procedure: CYSTOSCOPY, COLLECTION OF LEFT KIDNEY CYTOLOGY, BILATERAL RETROGRADE PYELOGRAM, BLADDER WALL BIOPSIES  AND Ozzy Arriaga, RANDOM BLADDER TUMOR BIOPSIES;  Surgeon: Shlomo Nicole MD;  Location: [address] [place] [address] MAIN OR;  Service: Urology    MD CYSTOURETHROSCOPY,BIOPSY N/A 4/5/2022    Procedure: urethroscopy , biopsy of urethra with fulgeration;  Surgeon: Rex Vences MD;  Location:  MAIN OR;  Service: Urology    MD CYSTOURETHROSCOPY,FULGUR 2-5 CM LESN N/A 4/16/2020    Procedure: CYSTOSCOPY, TRANSURETHRAL RESECTION OF BLADDER TUMOR (TURBT);   Surgeon: Shlomo Nicole MD;  Location[facility];  Service: Urology    MD CYSTOURETHROSCOPY,FULGUR <0 5 CM LESN N/A 12/5/2019    Procedure: CYSTO W/TURBT AND TRANSURETHRAL PROSTATE BIOPSY AND OPENING OF BLADDER NECK CONTRACTURE, B/L Retrograde pyelogram;  Surgeon: Shlomo Nicole MD;  [facility];  Service: Urology    TONSILLECTOMY      TRANSURETHRAL RESECTION OF PROSTATE N/A 12/20/2018 Procedure: CYSTO, PHOTO SELECTIVE VAPORIZATION OF PROSTATE, B/L RETROGRADE PYELOGRAM, TURBT;  Surgeon: Roseline Perez MD;  Location[facility];  Service: Urology    UMBILICAL HERNIA REPAIR  2012    UPPER GASTROINTESTINAL ENDOSCOPY       Family History   Problem Relation Age of Onset    Cancer Mother     Other Mother         Digestive System Complications    Diabetes Father     Heart disease Father     Hypertension Father     Coronary artery disease Father     Hyperlipidemia Father       reports that he quit smoking about 50 years ago  His smoking use included cigarettes  He has a 13 00 pack-year smoking history  He has never used smokeless tobacco  He reports previous alcohol use of about 2 0 standard drinks of alcohol per week  He reports that he does not use drugs  Physical Exam:   Vitals:    08/11/22 1051 08/11/22 1132   BP: 142/80 140/72   BP Location: Left arm    Patient Position: Sitting    Cuff Size: Adult    Pulse: (!) 48    SpO2: 99%    Weight: 83 6 kg (184 lb 3 2 oz)    Height: 5' 8" (1 727 m)      Body mass index is 28 01 kg/m²  Physical Exam  Vitals reviewed  Constitutional:       Appearance: Normal appearance  HENT:      Head: Normocephalic and atraumatic  Nose: Nose normal       Mouth/Throat:      Mouth: Mucous membranes are moist       Pharynx: Oropharynx is clear  Eyes:      Extraocular Movements: Extraocular movements intact  Conjunctiva/sclera: Conjunctivae normal    Cardiovascular:      Rate and Rhythm: Normal rate and regular rhythm  Pulses: Normal pulses  Heart sounds: Normal heart sounds  Pulmonary:      Effort: Pulmonary effort is normal       Breath sounds: Normal breath sounds  Abdominal:      General: Bowel sounds are normal       Palpations: Abdomen is soft  Musculoskeletal:         General: Normal range of motion  Cervical back: Neck supple  Skin:     General: Skin is dry  Neurological:      General: No focal deficit present  Mental Status: He is alert and oriented to person, place, and time  Psychiatric:         Mood and Affect: Mood normal          Behavior: Behavior normal          Thought Content: Thought content normal          Judgment: Judgment normal             Lab Results:              Portions of the record may have been created with voice recognition software  Occasional wrong word or "sound a like" substitutions may have occurred due to the inherent limitations of voice recognition software   Read the chart carefully and recognize,

## 2022-08-11 ENCOUNTER — OFFICE VISIT (OUTPATIENT)
Dept: NEPHROLOGY | Facility: CLINIC | Age: 82
End: 2022-08-11
Payer: MEDICARE

## 2022-08-11 VITALS
SYSTOLIC BLOOD PRESSURE: 140 MMHG | BODY MASS INDEX: 27.92 KG/M2 | HEIGHT: 68 IN | HEART RATE: 48 BPM | WEIGHT: 184.2 LBS | OXYGEN SATURATION: 99 % | DIASTOLIC BLOOD PRESSURE: 72 MMHG

## 2022-08-11 DIAGNOSIS — N18.9 CHRONIC KIDNEY DISEASE-MINERAL AND BONE DISORDER: ICD-10-CM

## 2022-08-11 DIAGNOSIS — E11.9 DMII (DIABETES MELLITUS, TYPE 2) (HCC): ICD-10-CM

## 2022-08-11 DIAGNOSIS — E83.9 CHRONIC KIDNEY DISEASE-MINERAL AND BONE DISORDER: ICD-10-CM

## 2022-08-11 DIAGNOSIS — D49.4 BLADDER TUMOR: ICD-10-CM

## 2022-08-11 DIAGNOSIS — N18.4 BENIGN HYPERTENSION WITH CHRONIC KIDNEY DISEASE, STAGE IV (HCC): Primary | ICD-10-CM

## 2022-08-11 DIAGNOSIS — E87.20 METABOLIC ACIDOSIS: ICD-10-CM

## 2022-08-11 DIAGNOSIS — D63.1 ANEMIA OF CHRONIC RENAL FAILURE, UNSPECIFIED CKD STAGE: ICD-10-CM

## 2022-08-11 DIAGNOSIS — N25.81 SECONDARY HYPERPARATHYROIDISM OF RENAL ORIGIN (HCC): ICD-10-CM

## 2022-08-11 DIAGNOSIS — N18.9 ANEMIA OF CHRONIC RENAL FAILURE, UNSPECIFIED CKD STAGE: ICD-10-CM

## 2022-08-11 DIAGNOSIS — N18.4 STAGE 4 CHRONIC KIDNEY DISEASE (HCC): ICD-10-CM

## 2022-08-11 DIAGNOSIS — R80.1 PERSISTENT PROTEINURIA: ICD-10-CM

## 2022-08-11 DIAGNOSIS — M89.9 CHRONIC KIDNEY DISEASE-MINERAL AND BONE DISORDER: ICD-10-CM

## 2022-08-11 DIAGNOSIS — I12.9 BENIGN HYPERTENSION WITH CHRONIC KIDNEY DISEASE, STAGE IV (HCC): Primary | ICD-10-CM

## 2022-08-11 PROCEDURE — 99214 OFFICE O/P EST MOD 30 MIN: CPT | Performed by: NURSE PRACTITIONER

## 2022-08-19 DIAGNOSIS — E11.3293 TYPE 2 DIABETES MELLITUS WITH BOTH EYES AFFECTED BY MILD NONPROLIFERATIVE RETINOPATHY WITHOUT MACULAR EDEMA, WITHOUT LONG-TERM CURRENT USE OF INSULIN (HCC): ICD-10-CM

## 2022-08-23 ENCOUNTER — TELEPHONE (OUTPATIENT)
Dept: UROLOGY | Facility: MEDICAL CENTER | Age: 82
End: 2022-08-23

## 2022-08-23 NOTE — TELEPHONE ENCOUNTER
Pt under care of: Dr Silvano Elam    Last Seen: 6/24/2022    Reason for call: Patient is calling about his appt with Dr Silvano Elam on 9/7/2022 for a 3 month follow up  Patient has OR with Dr Bjorn Krishnan and says it is also with Dr Silvano Elam on 9/19/2022      Patient is wondering if the 9/7 appt should be canceled and is asking if he can be seen after his OR on 9/19    Pt can be reached at: [phone]

## 2022-08-23 NOTE — TELEPHONE ENCOUNTER
Yes, ok to cancel 9/7 appointment, and reschedule office visit with Dr Zhang Kidney after his urethrectomy with Dr Jocelyn Hebert

## 2022-09-15 ENCOUNTER — OFFICE VISIT (OUTPATIENT)
Dept: CARDIOLOGY CLINIC | Facility: CLINIC | Age: 82
End: 2022-09-15
Payer: MEDICARE

## 2022-09-15 VITALS
HEIGHT: 68 IN | HEART RATE: 53 BPM | SYSTOLIC BLOOD PRESSURE: 140 MMHG | DIASTOLIC BLOOD PRESSURE: 70 MMHG | BODY MASS INDEX: 27.74 KG/M2 | WEIGHT: 183 LBS

## 2022-09-15 DIAGNOSIS — I45.10 RBBB: ICD-10-CM

## 2022-09-15 DIAGNOSIS — Z01.810 PRE-OPERATIVE CARDIOVASCULAR EXAMINATION: ICD-10-CM

## 2022-09-15 DIAGNOSIS — I10 BENIGN ESSENTIAL HYPERTENSION: Primary | ICD-10-CM

## 2022-09-15 DIAGNOSIS — I25.5 ISCHEMIC CARDIOMYOPATHY: ICD-10-CM

## 2022-09-15 DIAGNOSIS — I25.10 CORONARY ARTERY DISEASE INVOLVING NATIVE CORONARY ARTERY OF NATIVE HEART WITHOUT ANGINA PECTORIS: ICD-10-CM

## 2022-09-15 PROCEDURE — 93000 ELECTROCARDIOGRAM COMPLETE: CPT | Performed by: INTERNAL MEDICINE

## 2022-09-15 PROCEDURE — 99214 OFFICE O/P EST MOD 30 MIN: CPT | Performed by: INTERNAL MEDICINE

## 2022-09-15 NOTE — LETTER
September 15, 2022     Zach Treadwell MD  [address] [facility]  [address] [place] [address]    Patient: Naun Mathis   YOB: 1940   Date of Visit: 9/15/2022       Dear Dr Leti Noriega:    Thank you for referring Alba Craft to me for evaluation  Below are my notes for this consultation  If you have questions, please do not hesitate to call me  I look forward to following your patient along with you  Sincerely,        Elizabeth Linda MD        CC: No Recipients  Elizabeth Linda MD  9/15/2022  4:49 PM  Incomplete                                             Cardiology Follow Up    Naun Mathis  1940  [id number]  [address] [facility] [address] [id number] [phone]    1  Benign essential hypertension  POCT ECG    CANCELED: POCT ECG   2  RBBB  POCT ECG    CANCELED: POCT ECG   3  Coronary artery disease involving native coronary artery of native heart without angina pectoris  POCT ECG    CANCELED: POCT ECG   4  Pre-operative cardiovascular examination  POCT ECG    CANCELED: POCT ECG   5  Ischemic cardiomyopathy           Discussion/Summary:  All of his assessed cardiac problems are stable  I will decrease his Lopressor to 25 mg BID with his slower HR  He has good exercise tolerance without cardiac symptoms  I feel that his cardiac risk for urological surgery is acceptable to proceed without further cardiac testing needed  Please restart ASA 81 mg daily after surgery when safe from a surgical perspective  Interval History: He has not had any cardiac problems since his last office visit on 5/7/2021  He says that he is active and recently moved which was a lot of exertion  He denies CP or chest pressure  He does get SOB at higher levels of activity which is unchanged for him  He has CAD with CABG x 4 in 2014  ECG today - Sinus bradycardia, HR 53 BPM, 1st degree AVB, old inferior infarct     no change from prior ECG    ECHO 7/2019 - EF 45%, LVH, akinesis of the inferior wall  Exercise stress test 7/2019 - % minutes 31 seconds Mp protocol, no ischemia                  Patient Active Problem List   Diagnosis    Backache    Benign essential hypertension    DMII (diabetes mellitus, type 2) ([facility]    Hyperlipidemia    Wright's esophagus with esophagitis    [facility]    Overactive bladder    Bladder tumor    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)    Hx of CABG    Malignant neoplasm of overlapping sites of bladder ([facility] )    Closed fracture of upper end of right fibula    Healthcare maintenance    History of bladder cancer    Coronary artery disease involving native coronary artery of native heart without angina pectoris    Ischemic cardiomyopathy    Positive urinary cytology    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)    Severe protein-calorie malnutrition (HCC)    Sepsis ([facility]    Metabolic acidosis    Hypomagnesemia    RBBB    Benign hypertension with chronic kidney disease, stage IV (HCC)    Persistent proteinuria    Anemia    Chronic kidney disease-mineral and bone disorder    Visual hallucinations    Functional diarrhea    Platelets decreased (HCC)    Urethral tumor    Secondary hyperparathyroidism of renal origin ([facility] )    Stage 4 chronic kidney disease ([facility] )     Past Medical History:   Diagnosis Date    Acute kidney injury ([facility] )     Arthritis     Hands    Wright esophagus     BPH with obstruction/lower urinary tract symptoms     CAD (coronary artery disease)     Last assessed 09/16/15    Cancer ([facility] )     bladder    Cataract, acquired     Last assessed 10/10/17    Chronic kidney disease     Coronary artery disease     Diabetes mellitus (HCC)     NIDDM    Diabetic neuropathy (HCC)     Feet    Enlarged prostate with lower urinary tract symptoms (LUTS)     Last assessed 10/10/17    Erectile dysfunction     GERD (gastroesophageal reflux disease)     Last assessed 10/10/17    Hemoptysis     Last assessed 16    Hypercholesterolemia     Last assessed 10/10/17    Hypertension     Last assessed 10/10/17    Macular degeneration     Right eye is particularly affected-peripheral vision intact    Myocardial infarction ([facility] )     OAB (overactive bladder)     Testicular hypofunction     Testicular hypogonadism     Last assessed 10/10/17    Tinnitus     Umbilical hernia     Last assessed 14    Urge incontinence of urine     Wears glasses      Social History     Socioeconomic History    Marital status: /Civil Union     Spouse name: Not on file    Number of children: Not on file    Years of education: Not on file    Highest education level: Not on file   Occupational History    Occupation: Sales position   Tobacco Use    Smoking status: Former Smoker     Packs/day: 1 00     Years: 13 00     Pack years: 13 00     Types: Cigarettes     Quit date:      Years since quittin 7    Smokeless tobacco: Never Used   Vaping Use    Vaping Use: Never used   Substance and Sexual Activity    Alcohol use: Not Currently     Alcohol/week: 2 0 standard drinks     Types: 1 Glasses of wine, 1 Cans of beer per week    Drug use: Never    Sexual activity: Not Currently   Other Topics Concern    Not on file   Social History Narrative    Always uses seatbelt        Caffeine use- Drinks 2 cups of coffee daily     Social Determinants of Health     Financial Resource Strain: Not on file   Food Insecurity: Not on file   Transportation Needs: Not on file   Physical Activity: Not on file   Stress: Not on file   Social Connections: Not on file   Intimate Partner Violence: Not on file   Housing Stability: Not on file      Family History   Problem Relation Age of Onset    Cancer Mother     Other Mother         Digestive System Complications    Diabetes Father     Heart disease Father     Hypertension Father     Coronary artery disease Father     Hyperlipidemia Father      Past Surgical History:   Procedure Laterality Date    COLONOSCOPY      CORONARY ARTERY BYPASS GRAFT  07/16/2014    ABG x 4 LIMA to LAD,SVG to diagnoal 2 SVG to OM-1, SVG to PDA, resection of partial plerual mass    CT GUIDED PERC DRAINAGE CATHETER PLACEMENT  2/19/2021    CYSTOSCOPY  2013    CYSTOSCOPY  02/08/2022    [place]    ESOPHAGOGASTRODUODENOSCOPY      FL RETROGRADE PYELOGRAM  12/20/2018    FL RETROGRADE PYELOGRAM  12/5/2019    INGUINAL HERNIA REPAIR Bilateral 2015    IR DRAINAGE TUBE CHECK AND/OR REMOVAL  3/5/2021    PHOTODYNAMIC THERAPY      For Barretts esophagus    NE COLONOSCOPY FLX DX W/COLLJ SPEC WHEN PFRMD N/A 4/25/2016    Procedure: COLONOSCOPY;  Surgeon: Jenna Walker MD;  Location:  GI LAB; Service: Gastroenterology    NE CYSTOURETHROSCOPY,BIOPSY N/A 9/10/2020    Procedure: CYSTOSCOPY, COLLECTION OF LEFT KIDNEY CYTOLOGY, BILATERAL RETROGRADE PYELOGRAM, BLADDER WALL BIOPSIES  AND Jasmin Dhillon, RANDOM BLADDER TUMOR BIOPSIES;  Surgeon: Maryse Hearn MD;  Location: [address] [place] [address] MAIN OR;  Service: Urology    NE CYSTOURETHROSCOPY,BIOPSY N/A 4/5/2022    Procedure: urethroscopy , biopsy of urethra with fulgeration;  Surgeon: Nida Coleman MD;  Location:  MAIN OR;  Service: Urology    NE CYSTOURETHROSCOPY,FULGUR 2-5 CM LESN N/A 4/16/2020    Procedure: CYSTOSCOPY, TRANSURETHRAL RESECTION OF BLADDER TUMOR (TURBT);   Surgeon: Maryse Hearn MD;  Location: [facility] Main OR;  Service: Urology    NE CYSTOURETHROSCOPY,FULGUR <0 5 CM LESN N/A 12/5/2019    Procedure: CYSTO W/TURBT AND TRANSURETHRAL PROSTATE BIOPSY AND OPENING OF BLADDER NECK CONTRACTURE, B/L Retrograde pyelogram;  Surgeon: Patrice Gerardo MD;  Location: [facility];  Service: Urology    TONSILLECTOMY      TRANSURETHRAL RESECTION OF PROSTATE N/A 12/20/2018    Procedure: CYSTO, PHOTO SELECTIVE VAPORIZATION OF PROSTATE, B/L RETROGRADE PYELOGRAM, TURBT;  Surgeon: Patrice Gerardo MD;  Location: [facility];  Service: Urology    UMBILICAL HERNIA REPAIR  2012    UPPER GASTROINTESTINAL ENDOSCOPY         Current Outpatient Medications:     atorvastatin (LIPITOR) 40 mg tablet, TAKE ONE TABLET BY MOUTH AT BEDTIME, Disp: 90 tablet, Rfl: 3    Cholecalciferol (VITAMIN D) 50 MCG (2000 UT) tablet, Take 2,000 Units by mouth daily, Disp: , Rfl:     citalopram (CeleXA) 20 mg tablet, TAKE ONE TABLET BY MOUTH EVERY DAY, Disp: 30 tablet, Rfl: 3    Dapagliflozin Propanediol (Farxiga) 5 MG TABS, Take 1 tablet (5 mg total) by mouth daily, Disp: 30 tablet, Rfl: 2    ferrous sulfate 324 (65 Fe) mg, Take 1 tablet (324 mg total) by mouth daily, Disp: 30 tablet, Rfl: 5    magnesium oxide (MAG-OX) 400 mg, Take 1 tablet (400 mg total) by mouth in the morning , Disp: 180 tablet, Rfl: 2    metoprolol tartrate (LOPRESSOR) 50 mg tablet, Take 1 tablet (50 mg total) by mouth 2 (two) times a day, Disp: 180 tablet, Rfl: 3    Multiple Vitamins-Minerals (OCUVITE-LUTEIN PO), Take 1 tablet by mouth 2 (two) times a day Pt is taking preservision , Disp: , Rfl:     nystatin (MYCOSTATIN) powder, Apply topically 3 (three) times a day (Patient taking differently: Apply topically as needed), Disp: 30 g, Rfl: 3    omeprazole (PriLOSEC) 40 MG capsule, Take 1 capsule (40 mg total) by mouth in the morning , Disp: 90 capsule, Rfl: 3    sodium bicarbonate 650 mg tablet, Take 2 tablets (1,300 mg total) by mouth 2 (two) times a day, Disp: 360 tablet, Rfl: 3    vitamin E, tocopherol, 400 units capsule, Take 400 Units by mouth daily, Disp: , Rfl:     acetaminophen (TYLENOL) 500 mg tablet, Take 1,000 mg by mouth every 6 (six) hours as needed for mild pain or moderate pain  (Patient not taking: No sig reported), Disp: , Rfl:     Blood Glucose Monitoring Suppl (OneTouch Verio Flex System) w/Device KIT, USE TO TEST BLOOD GLUCOSE DAILY, Disp: 1 kit, Rfl: 0    fluticasone (FLONASE) 50 mcg/act nasal spray, 2 sprays into each nostril daily (Patient not taking: Reported on 9/15/2022), Disp: 16 g, Rfl: 3    Lancets (OneTouch Delica Plus [id number]) MISC, TEST BLOOD GLUCOSE ONCE DAILY, Disp: 100 each, Rfl: 0    OneTouch Verio test strip, TEST ONCE A DAY, Disp: 100 strip, Rfl: 0    traMADol (ULTRAM) 50 mg tablet, TAKE 1 TABLET BY MOUTH EVERY 6 HOURS AS NEEDED FOR MODERATE PAIN (Patient not taking: No sig reported), Disp: 20 tablet, Rfl: 0  Allergies   Allergen Reactions    Fentanyl Hallucinations    Morphine And Related Other (See Comments)     While having an MI patient received morphine and had adverse reaction but doesn't know what happened  Vitals:    09/15/22 1449   BP: 140/70   BP Location: Left arm   Cuff Size: Standard   Pulse: (!) 53   Weight: 83 kg (183 lb)   Height: 5' 8" (1 727 m)     Weight (last 2 days)     Date/Time Weight    09/15/22 1449 83 (183)         Blood pressure 140/70, pulse (!) 53, height 5' 8" (1 727 m), weight 83 kg (183 lb)  , Body mass index is 27 83 kg/m²      Labs:  Hospital Outpatient Visit on 07/25/2022   Component Date Value    POC Glucose 07/25/2022 112    Appointment on 07/19/2022   Component Date Value    Sodium 07/19/2022 141     Potassium 07/19/2022 5 3     Chloride 07/19/2022 118 (A)    CO2 07/19/2022 19 (A)    ANION GAP 07/19/2022 4     BUN 07/19/2022 48 (A)    Creatinine 07/19/2022 2 25 (A)    Glucose, Fasting 07/19/2022 145 (A)    Calcium 07/19/2022 9 4     eGFR 07/19/2022 26     Hemoglobin A1C 07/19/2022 6 0 (A)    EAG 07/19/2022 126    Lab on 07/01/2022   Component Date Value    Sodium 07/01/2022 137     Potassium 07/01/2022 4 5     Chloride 07/01/2022 108     CO2 07/01/2022 21     ANION GAP 07/01/2022 8     BUN 07/01/2022 57 (A)    Creatinine 07/01/2022 2 38 (A)    Glucose 07/01/2022 175 (A)    Calcium 07/01/2022 9 1     eGFR 07/01/2022 24     Vit D, 25-Hydroxy 07/01/2022 42 4     Creatinine, Ur 07/01/2022 53 0     Protein Urine Random 07/01/2022 30     Prot/Creat Ratio, Ur 07/01/2022 0 57 (A)    Color, UA 07/01/2022 Light Yellow     Clarity, UA 07/01/2022 Cloudy     Specific Gravity, UA 07/01/2022 1 015     pH, UA 07/01/2022 6 5     Leukocytes, UA 07/01/2022 Large (A)    Nitrite, UA 07/01/2022 Negative     Protein, UA 07/01/2022 Trace (A)    Glucose, UA 07/01/2022 Negative     Ketones, UA 07/01/2022 Negative     Urobilinogen, UA 07/01/2022 <2 0     Bilirubin, UA 07/01/2022 Negative     Occult Blood, UA 07/01/2022 Negative     RBC, UA 07/01/2022 None Seen     WBC, UA 07/01/2022 10-20 (A)    Epithelial Cells 07/01/2022 None Seen     Bacteria, UA 07/01/2022 Innumerable (A)    Amorphous Crystals, UA 07/01/2022 Innumerable     Phosphorus 07/01/2022 3 3     Magnesium 07/01/2022 2 2    Lab on 05/26/2022   Component Date Value    Sodium 05/26/2022 139     Potassium 05/26/2022 5 1     Chloride 05/26/2022 111 (A)    CO2 05/26/2022 18 (A)    ANION GAP 05/26/2022 10     BUN 05/26/2022 50 (A)    Creatinine 05/26/2022 2 47 (A)    Glucose, Fasting 05/26/2022 164 (A)    Calcium 05/26/2022 9 5     eGFR 05/26/2022 23    Appointment on 05/16/2022   Component Date Value    Sodium 05/16/2022 138     Potassium 05/16/2022 5 4 (A)    Chloride 05/16/2022 109 (A)    CO2 05/16/2022 25     ANION GAP 05/16/2022 4     BUN 05/16/2022 59 (A)    Creatinine 05/16/2022 2 87 (A)    Glucose, Fasting 05/16/2022 221 (A)    Calcium 05/16/2022 9 5     eGFR 05/16/2022 19     Hemoglobin A1C 05/16/2022 6 1 (A)    EAG 05/16/2022 128     WBC 05/16/2022 10 39 (A)    RBC 05/16/2022 4 13     Hemoglobin 05/16/2022 12 9     Hematocrit 05/16/2022 39 9     MCV 05/16/2022 97     MCH 05/16/2022 31 2     MCHC 05/16/2022 32 3     RDW 05/16/2022 14 3     MPV 05/16/2022 12 3     Platelets 06/25/4182 156     nRBC 05/16/2022 0     Neutrophils Relative 05/16/2022 76 (A)    Immat GRANS % 05/16/2022 1     Lymphocytes Relative 05/16/2022 12 (A)    Monocytes Relative 05/16/2022 8     Eosinophils Relative 05/16/2022 2     Basophils Relative 05/16/2022 1     Neutrophils Absolute 05/16/2022 7 98 (A)    Immature Grans Absolute 05/16/2022 0 06     Lymphocytes Absolute 05/16/2022 1 28     Monocytes Absolute 05/16/2022 0 80     Eosinophils Absolute 05/16/2022 0 20     Basophils Absolute 05/16/2022 0 07     Magnesium 05/16/2022 2 7 (A)    Phosphorus 05/16/2022 3 3     Creatinine, Ur 05/16/2022 81 9     Protein Urine Random 05/16/2022 103     Prot/Creat Ratio, Ur 05/16/2022 1 26 (A)    PTH 05/16/2022 108 7 (A)   Admission on 04/05/2022, Discharged on 04/05/2022   Component Date Value    POC Glucose 04/05/2022 134     Case Report 04/05/2022                      Value:Surgical Pathology Report                         Case: [id number]                                   Authorizing Provider:  Abbie Dalton MD  Collected:           04/05/2022 [id number]              Ordering Location:     [address] [place] [address]      Received:            04/05/2022 [facility] Operating Room                                                      Pathologist:           Shantelle Gusman MD                                                         Specimen:    Urethra, urethral bx                                                                       Final Diagnosis 04/05/2022                      Value: This result contains rich text formatting which cannot be displayed here   Additional Information 04/05/2022                      Value: This result contains rich text formatting which cannot be displayed here  Juhi Connell Gross Description 04/05/2022                      Value: This result contains rich text formatting which cannot be displayed here   POC Glucose 04/05/2022 114      Imaging: No results found  Review of Systems:  Review of Systems   Constitutional: Negative for diaphoresis, fatigue, fever and unexpected weight change  HENT: Negative  Respiratory: Negative for cough, shortness of breath and wheezing  Cardiovascular: Negative for chest pain, palpitations and leg swelling  Gastrointestinal: Negative for abdominal pain, diarrhea and nausea  Musculoskeletal: Negative for gait problem and myalgias  Skin: Negative for rash  Neurological: Negative for dizziness and numbness  Psychiatric/Behavioral: Negative  Physical Exam:  Physical Exam  Constitutional:       Appearance: He is well-developed  HENT:      Head: Normocephalic and atraumatic  Eyes:      Pupils: Pupils are equal, round, and reactive to light  Neck:      Vascular: No JVD  Cardiovascular:      Rate and Rhythm: Regular rhythm  Pulses: Normal pulses  Carotid pulses are 2+ on the right side and 2+ on the left side  Heart sounds: S1 normal and S2 normal    Pulmonary:      Effort: Pulmonary effort is normal       Breath sounds: Normal breath sounds  No wheezing or rales  Abdominal:      General: Bowel sounds are normal       Palpations: Abdomen is soft  Tenderness: There is no abdominal tenderness  Musculoskeletal:         General: No tenderness  Normal range of motion  Cervical back: Normal range of motion and neck supple  Skin:     General: Skin is warm  Neurological:      Mental Status: He is alert and oriented to person, place, and time  Cranial Nerves: No cranial nerve deficit  Deep Tendon Reflexes: Reflexes are normal and symmetric

## 2022-09-15 NOTE — PROGRESS NOTES
Cardiology Follow Up    [address] [place] [address]  1940  [id number]  [address] [facility] [address] [phone]    1  Benign essential hypertension  POCT ECG    CANCELED: POCT ECG   2  RBBB  POCT ECG    CANCELED: POCT ECG   3  Coronary artery disease involving native coronary artery of native heart without angina pectoris  POCT ECG    CANCELED: POCT ECG   4  Pre-operative cardiovascular examination  POCT ECG    CANCELED: POCT ECG   5  Ischemic cardiomyopathy           Discussion/Summary:  All of his assessed cardiac problems are stable  I will decrease his Lopressor to 25 mg BID with his slower HR  He has good exercise tolerance without cardiac symptoms  I feel that his cardiac risk for urological surgery is acceptable to proceed without further cardiac testing needed  Please restart ASA 81 mg daily after surgery when safe from a surgical perspective  Interval History: He has not had any cardiac problems since his last office visit on 5/7/2021  He says that he is active and recently moved which was a lot of exertion  He denies CP or chest pressure  He does get SOB at higher levels of activity which is unchanged for him  He has CAD with CABG x 4 in 2014  ECG today - Sinus bradycardia, HR 53 BPM, 1st degree AVB, old inferior infarct     no change from prior ECG    ECHO 7/2019 - EF 45%, LVH, akinesis of the inferior wall  Exercise stress test 7/2019 - % minutes 31 seconds Mp protocol, no ischemia                  Patient Active Problem List   Diagnosis    Backache    Benign essential hypertension    DMII (diabetes mellitus, type 2) ([address] )    Hyperlipidemia    Wright's esophagus with esophagitis    Acute kidney injury ([facility]    Overactive bladder    Bladder tumor    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)    Hx of CABG    Malignant neoplasm of overlapping sites of bladder [facility])    Closed fracture of upper end of right fibula    Healthcare maintenance    History of bladder cancer    Coronary artery disease involving native coronary artery of native heart without angina pectoris    Ischemic cardiomyopathy    Positive urinary cytology    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)    Liver function study, abnormal    Elevated troponin    Forearm mass, left    Fungal dermatitis    Anxiety and depression    Overweight    Ambulatory dysfunction    Frequent falls    Right sided Pelvic abscess in male [facility])    Severe protein-calorie malnutrition (HCC)    Sepsis ([facility] )    Metabolic acidosis    Hypomagnesemia    RBBB    Benign hypertension with chronic kidney disease, stage IV (HCC)    Persistent proteinuria    Anemia    Chronic kidney disease-mineral and bone disorder    Visual hallucinations    Functional diarrhea    Platelets decreased ([facility])    Urethral tumor    Secondary hyperparathyroidism of renal origin ([facility] )    Stage 4 chronic kidney disease ([facility] )     Past Medical History:   Diagnosis Date    Acute kidney injury ([facility] )     Arthritis     Hands    Wright esophagus     BPH with obstruction/lower urinary tract symptoms     CAD (coronary artery disease)     Last assessed 09/16/15    Cancer ([facility] )     bladder    Cataract, acquired     Last assessed 10/10/17    Chronic kidney disease     Coronary artery disease     Diabetes mellitus ([facility] )     NIDDM    Diabetic neuropathy ([facility])     Feet    Enlarged prostate with lower urinary tract symptoms (LUTS)     Last assessed 10/10/17    Erectile dysfunction     GERD (gastroesophageal reflux disease)     Last assessed 10/10/17    Hemoptysis     Last assessed 03/14/16    Hypercholesterolemia     Last assessed 10/10/17    Hypertension     Last assessed 10/10/17    Macular degeneration     Right eye is particularly affected-peripheral vision intact    Myocardial infarction ([facility] )     OAB (overactive bladder)     Testicular hypofunction     Testicular hypogonadism     Last assessed 10/10/17    Tinnitus     Umbilical hernia     Last assessed 14    Urge incontinence of urine     Wears glasses      Social History     Socioeconomic History    Marital status: /Civil Union     Spouse name: Not on file    Number of children: Not on file    Years of education: Not on file    Highest education level: Not on file   Occupational History    Occupation: Sales position   Tobacco Use    Smoking status: Former Smoker     Packs/day: 1 00     Years: 13      Pack years: 13      Types: Cigarettes     Quit date:      Years since quittin 7    Smokeless tobacco: Never Used   Vaping Use    Vaping Use: Never used   Substance and Sexual Activity    Alcohol use: Not Currently     Alcohol/week: 2 0 standard drinks     Types: 1 Glasses of wine, 1 Cans of beer per week    Drug use: Never    Sexual activity: Not Currently   Other Topics Concern    Not on file   Social History Narrative    Always uses seatbelt        Caffeine use- Drinks 2 cups of coffee daily     Social Determinants of Health     Financial Resource Strain: Not on file   Food Insecurity: Not on file   Transportation Needs: Not on file   Physical Activity: Not on file   Stress: Not on file   Social Connections: Not on file   Intimate Partner Violence: Not on file   Housing Stability: Not on file      Family History   Problem Relation Age of Onset    Cancer Mother     Other Mother         Digestive System Complications    Diabetes Father     Heart disease Father     Hypertension Father     Coronary artery disease Father     Hyperlipidemia Father      Past Surgical History:   Procedure Laterality Date    COLONOSCOPY      CORONARY ARTERY BYPASS GRAFT  2014    ABG x 4 LIMA to LAD,SVG to diagnoal 2 SVG to OM-1, SVG to PDA, resection of partial plerual mass    CT GUIDED PERC DRAINAGE CATHETER PLACEMENT  2/19/2021    CYSTOSCOPY  2013    CYSTOSCOPY  02/08/2022    [place]    ESOPHAGOGASTRODUODENOSCOPY      FL RETROGRADE PYELOGRAM  12/20/2018    FL RETROGRADE PYELOGRAM  12/5/2019    INGUINAL HERNIA REPAIR Bilateral 2015    IR DRAINAGE TUBE CHECK AND/OR REMOVAL  3/5/2021    PHOTODYNAMIC THERAPY      For Barretts esophagus    [place] COLONOSCOPY FLX DX W/COLLJ SPEC WHEN PFRMD N/A 4/25/2016    Procedure: COLONOSCOPY;  Surgeon: Darryl Haines MD;  Location:  GI LAB; Service: Gastroenterology    [place] CYSTOURETHROSCOPY,BIOPSY N/A 9/10/2020    Procedure: CYSTOSCOPY, COLLECTION OF LEFT KIDNEY CYTOLOGY, BILATERAL RETROGRADE PYELOGRAM, BLADDER WALL BIOPSIES  AND [address], RANDOM BLADDER TUMOR BIOPSIES;  Surgeon: Ferrel Boxer, MD;  Location: [address] [place] [address] MAIN OR;  Service: Urology    NY CYSTOURETHROSCOPY,BIOPSY N/A 4/5/2022    Procedure: urethroscopy , biopsy of urethra with fulgeration;  Surgeon: Ailin Ribera MD;  Location:  MAIN OR;  Service: Urology    NY CYSTOURETHROSCOPY,FULGUR 2-5 CM LESN N/A 4/16/2020    Procedure: CYSTOSCOPY, TRANSURETHRAL RESECTION OF BLADDER TUMOR (TURBT);   Surgeon: Ferrel Boxer, MD;  Location[facility];  Service: Urology    NY CYSTOURETHROSCOPY,FULGUR <0 5 CM LESN N/A 12/5/2019    Procedure: CYSTO W/TURBT AND TRANSURETHRAL PROSTATE BIOPSY AND OPENING OF BLADDER NECK CONTRACTURE, B/L Retrograde pyelogram;  Surgeon: Ferrel Boxer, MD;  [facility];  Service: Urology    TONSILLECTOMY      TRANSURETHRAL RESECTION OF PROSTATE N/A 12/20/2018    Procedure: CYSTO, PHOTO SELECTIVE VAPORIZATION OF PROSTATE, B/L RETROGRADE PYELOGRAM, TURBT;  Surgeon: Ferrel Boxer, MD;  Location[facility];  Service: Urology    UMBILICAL HERNIA REPAIR  2012    UPPER GASTROINTESTINAL ENDOSCOPY         Current Outpatient Medications:     atorvastatin (LIPITOR) 40 mg tablet, TAKE ONE TABLET BY MOUTH AT BEDTIME, Disp: 90 tablet, Rfl: 3    Cholecalciferol (VITAMIN D) 50 MCG (2000 UT) tablet, Take 2,000 Units by mouth daily, Disp: , Rfl:     citalopram (CeleXA) 20 mg tablet, TAKE ONE TABLET BY MOUTH EVERY DAY, Disp: 30 tablet, Rfl: 3    Dapagliflozin Propanediol (Farxiga) 5 MG TABS, Take 1 tablet (5 mg total) by mouth daily, Disp: 30 tablet, Rfl: 2    ferrous sulfate 324 (65 Fe) mg, Take 1 tablet (324 mg total) by mouth daily, Disp: 30 tablet, Rfl: 5    magnesium oxide (MAG-OX) 400 mg, Take 1 tablet (400 mg total) by mouth in the morning , Disp: 180 tablet, Rfl: 2    metoprolol tartrate (LOPRESSOR) 50 mg tablet, Take 1 tablet (50 mg total) by mouth 2 (two) times a day, Disp: 180 tablet, Rfl: 3    Multiple Vitamins-Minerals (OCUVITE-LUTEIN PO), Take 1 tablet by mouth 2 (two) times a day Pt is taking preservision , Disp: , Rfl:     nystatin (MYCOSTATIN) powder, Apply topically 3 (three) times a day (Patient taking differently: Apply topically as needed), Disp: 30 g, Rfl: 3    omeprazole (PriLOSEC) 40 MG capsule, Take 1 capsule (40 mg total) by mouth in the morning , Disp: 90 capsule, Rfl: 3    sodium bicarbonate 650 mg tablet, Take 2 tablets (1,300 mg total) by mouth 2 (two) times a day, Disp: 360 tablet, Rfl: 3    vitamin E, tocopherol, 400 units capsule, Take 400 Units by mouth daily, Disp: , Rfl:     acetaminophen (TYLENOL) 500 mg tablet, Take 1,000 mg by mouth every 6 (six) hours as needed for mild pain or moderate pain  (Patient not taking: No sig reported), Disp: , Rfl:     Blood Glucose Monitoring Suppl (OneTouch Verio Flex System) w/Device KIT, USE TO TEST BLOOD GLUCOSE DAILY, Disp: 1 kit, Rfl: 0    fluticasone (FLONASE) 50 mcg/act nasal spray, 2 sprays into each nostril daily (Patient not taking: Reported on 9/15/2022), Disp: 16 g, Rfl: 3    Lancets (OneTouch Delica Plus QJPATL34K) MISC, TEST BLOOD GLUCOSE ONCE DAILY, Disp: 100 each, Rfl: 0    OneTouch Verio test strip, TEST ONCE A DAY, Disp: 100 strip, Rfl: 0    traMADol (ULTRAM) 50 mg tablet, TAKE 1 TABLET BY MOUTH EVERY 6 HOURS AS NEEDED FOR MODERATE PAIN (Patient not taking: No sig reported), Disp: 20 tablet, Rfl: 0  Allergies   Allergen Reactions    Fentanyl Hallucinations    Morphine And Related Other (See Comments)     While having an MI patient received morphine and had adverse reaction but doesn't know what happened  Vitals:    09/15/22 1449   BP: 140/70   BP Location: Left arm   Cuff Size: Standard   Pulse: (!) 53   Weight: 83 kg (183 lb)   Height: 5' 8" (1 727 m)     Weight (last 2 days)     Date/Time Weight    09/15/22 1449 83 (183)         Blood pressure 140/70, pulse (!) 53, height 5' 8" (1 727 m), weight 83 kg (183 lb)  , Body mass index is 27 83 kg/m²      Labs:  Hospital Outpatient Visit on 07/25/2022   Component Date Value    POC Glucose 07/25/2022 112    Appointment on 07/19/2022   Component Date Value    Sodium 07/19/2022 141     Potassium 07/19/2022 5 3     Chloride 07/19/2022 118 (A)    CO2 07/19/2022 19 (A)    ANION GAP 07/19/2022 4     BUN 07/19/2022 48 (A)    Creatinine 07/19/2022 2 25 (A)    Glucose, Fasting 07/19/2022 145 (A)    Calcium 07/19/2022 9 4     eGFR 07/19/2022 26     Hemoglobin A1C 07/19/2022 6 0 (A)    EAG 07/19/2022 126    Lab on 07/01/2022   Component Date Value    Sodium 07/01/2022 137     Potassium 07/01/2022 4 5     Chloride 07/01/2022 108     CO2 07/01/2022 21     ANION GAP 07/01/2022 8     BUN 07/01/2022 57 (A)    Creatinine 07/01/2022 2 38 (A)    Glucose 07/01/2022 175 (A)    Calcium 07/01/2022 9 1     eGFR 07/01/2022 24     Vit D, 25-Hydroxy 07/01/2022 42 4     Creatinine, Ur 07/01/2022 53 0     Protein Urine Random 07/01/2022 30     Prot/Creat Ratio, Ur 07/01/2022 0 57 (A)    Color, UA 07/01/2022 Light Yellow     Clarity, UA 07/01/2022 Cloudy     Specific Gravity, UA 07/01/2022 1 015     pH, UA 07/01/2022 6 5     Leukocytes, UA 07/01/2022 Large (A)    Nitrite, UA 07/01/2022 Negative     Protein, UA 07/01/2022 Trace (A)  Glucose, UA 07/01/2022 Negative     Ketones, UA 07/01/2022 Negative     Urobilinogen, UA 07/01/2022 <2 0     Bilirubin, UA 07/01/2022 Negative     Occult Blood, UA 07/01/2022 Negative     RBC, UA 07/01/2022 None Seen     WBC, UA 07/01/2022 10-20 (A)    Epithelial Cells 07/01/2022 None Seen     Bacteria, UA 07/01/2022 Innumerable (A)    Amorphous Crystals, UA 07/01/2022 Innumerable     Phosphorus 07/01/2022 3 3     Magnesium 07/01/2022 2 2    Lab on 05/26/2022   Component Date Value    Sodium 05/26/2022 139     Potassium 05/26/2022 5 1     Chloride 05/26/2022 111 (A)    CO2 05/26/2022 18 (A)    ANION GAP 05/26/2022 10     BUN 05/26/2022 50 (A)    Creatinine 05/26/2022 2 47 (A)    Glucose, Fasting 05/26/2022 164 (A)    Calcium 05/26/2022 9 5     eGFR 05/26/2022 23    Appointment on 05/16/2022   Component Date Value    Sodium 05/16/2022 138     Potassium 05/16/2022 5 4 (A)    Chloride 05/16/2022 109 (A)    CO2 05/16/2022 25     ANION GAP 05/16/2022 4     BUN 05/16/2022 59 (A)    Creatinine 05/16/2022 2 87 (A)    Glucose, Fasting 05/16/2022 221 (A)    Calcium 05/16/2022 9 5     eGFR 05/16/2022 19     Hemoglobin A1C 05/16/2022 6 1 (A)    EAG 05/16/2022 128     WBC 05/16/2022 10 39 (A)    RBC 05/16/2022 4 13     Hemoglobin 05/16/2022 12 9     Hematocrit 05/16/2022 39 9     MCV 05/16/2022 97     MCH 05/16/2022 31 2     MCHC 05/16/2022 32 3     RDW 05/16/2022 14 3     MPV 05/16/2022 12 3     Platelets 97/90/8970 156     nRBC 05/16/2022 0     Neutrophils Relative 05/16/2022 76 (A)    Immat GRANS % 05/16/2022 1     Lymphocytes Relative 05/16/2022 12 (A)    Monocytes Relative 05/16/2022 8     Eosinophils Relative 05/16/2022 2     Basophils Relative 05/16/2022 1     Neutrophils Absolute 05/16/2022 7 98 (A)    Immature Grans Absolute 05/16/2022 0 06     Lymphocytes Absolute 05/16/2022 1 28     Monocytes Absolute 05/16/2022 0 80     Eosinophils Absolute 05/16/2022 0 20  Basophils Absolute 05/16/2022 0 07     Magnesium 05/16/2022 2 7 (A)    Phosphorus 05/16/2022 3 3     Creatinine, Ur 05/16/2022 81 9     Protein Urine Random 05/16/2022 103     Prot/Creat Ratio, Ur 05/16/2022 1 26 (A)    PTH 05/16/2022 108 7 (A)   Admission on 04/05/2022, Discharged on 04/05/2022   Component Date Value    POC Glucose 04/05/2022 134     Case Report 04/05/2022                      Value:Surgical Pathology Report                         Case: [id number]                                   Authorizing Provider:  Mona Huston MD  Collected:           04/05/2022 [id number]              Ordering Location:     [address] [place] [address] Road      Received:            04/05/2022 [address] Operating Room                                                      Pathologist:           Arinaa Gallego MD                                                         Specimen:    Urethra, urethral bx                                                                       Final Diagnosis 04/05/2022                      Value: This result contains rich text formatting which cannot be displayed here   Additional Information 04/05/2022                      Value: This result contains rich text formatting which cannot be displayed here  [facility] Gross Description 04/05/2022                      Value: This result contains rich text formatting which cannot be displayed here   POC Glucose 04/05/2022 114      Imaging: No results found  Review of Systems:  Review of Systems   Constitutional: Negative for diaphoresis, fatigue, fever and unexpected weight change  HENT: Negative  Respiratory: Negative for cough, shortness of breath and wheezing  Cardiovascular: Negative for chest pain, palpitations and leg swelling  Gastrointestinal: Negative for abdominal pain, diarrhea and nausea  Musculoskeletal: Negative for gait problem and myalgias  Skin: Negative for rash     Neurological: Negative for dizziness and numbness  Psychiatric/Behavioral: Negative  Physical Exam:  Physical Exam  Constitutional:       Appearance: He is well-developed  HENT:      Head: Normocephalic and atraumatic  Eyes:      Pupils: Pupils are equal, round, and reactive to light  Neck:      Vascular: No JVD  Cardiovascular:      Rate and Rhythm: Regular rhythm  Pulses: Normal pulses  Carotid pulses are 2+ on the right side and 2+ on the left side  Heart sounds: S1 normal and S2 normal    Pulmonary:      Effort: Pulmonary effort is normal       Breath sounds: Normal breath sounds  No wheezing or rales  Abdominal:      General: Bowel sounds are normal       Palpations: Abdomen is soft  Tenderness: There is no abdominal tenderness  Musculoskeletal:         General: No tenderness  Normal range of motion  Cervical back: Normal range of motion and neck supple  Skin:     General: Skin is warm  Neurological:      Mental Status: He is alert and oriented to person, place, and time  Cranial Nerves: No cranial nerve deficit  Deep Tendon Reflexes: Reflexes are normal and symmetric

## 2022-09-16 DIAGNOSIS — C67.8 MALIGNANT NEOPLASM OF OVERLAPPING SITES OF BLADDER (HCC): ICD-10-CM

## 2022-09-18 RX ORDER — TRAMADOL HYDROCHLORIDE 50 MG/1
TABLET ORAL
Qty: 20 TABLET | Refills: 0 | Status: SHIPPED | OUTPATIENT
Start: 2022-09-18

## 2022-09-19 ENCOUNTER — TELEPHONE (OUTPATIENT)
Dept: HEMATOLOGY ONCOLOGY | Facility: CLINIC | Age: 82
End: 2022-09-19

## 2022-09-19 NOTE — TELEPHONE ENCOUNTER
Scheduling Appointment SEND TO [facility]    Who Is Calling to Schedule Patient    Doctor Dr Edmund Faye   Date and Time 9/20 at 1:20pm    Reason for scheduling appointment Patient states he was due for Surgery with Dr Arthur Riggs from [facility], but Dr Arthur Riggs advised the patient to try Immunotherapy first     Patient provided Dr Aleksandr Farfan call back number , [phone]  Patient verbalized understanding    Yes

## 2022-09-20 ENCOUNTER — OFFICE VISIT (OUTPATIENT)
Dept: HEMATOLOGY ONCOLOGY | Facility: CLINIC | Age: 82
End: 2022-09-20
Payer: MEDICARE

## 2022-09-20 ENCOUNTER — TELEPHONE (OUTPATIENT)
Dept: HEMATOLOGY ONCOLOGY | Facility: CLINIC | Age: 82
End: 2022-09-20

## 2022-09-20 VITALS
WEIGHT: 183.5 LBS | HEART RATE: 72 BPM | OXYGEN SATURATION: 98 % | HEIGHT: 68 IN | RESPIRATION RATE: 17 BRPM | SYSTOLIC BLOOD PRESSURE: 152 MMHG | DIASTOLIC BLOOD PRESSURE: 90 MMHG | TEMPERATURE: 97 F | BODY MASS INDEX: 27.81 KG/M2

## 2022-09-20 DIAGNOSIS — C67.8 MALIGNANT NEOPLASM OF OVERLAPPING SITES OF BLADDER (HCC): Primary | ICD-10-CM

## 2022-09-20 DIAGNOSIS — T45.1X5A CHEMOTHERAPY-INDUCED NAUSEA: Primary | ICD-10-CM

## 2022-09-20 DIAGNOSIS — R11.0 CHEMOTHERAPY-INDUCED NAUSEA: Primary | ICD-10-CM

## 2022-09-20 PROCEDURE — 99215 OFFICE O/P EST HI 40 MIN: CPT | Performed by: INTERNAL MEDICINE

## 2022-09-20 RX ORDER — ONDANSETRON 4 MG/1
4 TABLET, FILM COATED ORAL EVERY 8 HOURS PRN
Qty: 20 TABLET | Refills: 1 | Status: SHIPPED | OUTPATIENT
Start: 2022-09-20

## 2022-09-20 NOTE — TELEPHONE ENCOUNTER
While we try to accommodate patient requests, our priority is to schedule treatment according to Doctor's orders and site availability      1  Does the Provider use the intake sheet or checkout note? NO  2  What would be a preferred day of the week that would work best for your infusion appointment? THURSDAY  3  Do you prefer mornings or afternoons for your appointments? EARLY AFTERNOON  4  Are there any days or dates that do not work for your schedule, including any upcoming vacations? NO  5  We are going to try our best to schedule you at the infusion center closest to your home  In the event that we are unable to what would be your next preferred infusion site or sites?      1  [place]  2  [place] - please contact patient prior to scheduling  3  [place]     6  Do you have transportation to take you to all of your appointments? yes  7   Would you like the infusion center to draw labs from your port? (disregard if patient doesn't have a port or need labs for infusion appointment)         SCHEDULE FOLLOW UP APPT AS WELL

## 2022-09-20 NOTE — TELEPHONE ENCOUNTER
While we try to accommodate patient requests, our priority is to schedule treatment according to Doctor's orders and site availability  1  Does the Provider use the intake sheet or checkout note? NO  2  What would be a preferred day of the week that would work best for your infusion appointment? THURSDAY  3  Do you prefer mornings or afternoons for your appointments? EARLY AFTERNOON  4  Are there any days or dates that do not work for your schedule, including any upcoming vacations? NO  5  We are going to try our best to schedule you at the infusion center closest to your home  In the event that we are unable to what would be your next preferred infusion site or sites? [facility]  [facility]  [facility] - please contact patient prior to scheduling  3  [place]    6  Do you have transportation to take you to all of your appointments? yes  7   Would you like the infusion center to draw labs from your port? (disregard if patient doesn't have a port or need labs for infusion appointment)       SCHEDULE FOLLOW UP APPT AS WELL

## 2022-09-20 NOTE — PROGRESS NOTES
ONCOLOGY CHECKLIST     ONCOLOGY TREATMENT     Task Priority Due Responsible Completed Completed By Step Outcome    Perform IV assessment, review PORT procedure               Review schedule / calendar               Reviewed if transportation assistance is needed               Reviewed what to expect the first day of treatment               Discussed role of Hopeline    9/20/2022 Isa Joy RN          Orders placed for pre-treatment labs    9/20/2022 Isa Joy RN          Reviewed common side-effects    9/20/2022 Isa Joy RN          Reviewed medical oncology nurse contact information    9/20/2022 Isa Joy RN          Reviewed regimen specific education sheets    9/20/2022 Isa Joy RN          Reviewed use of supportive medication    9/20/2022 Isa Joy RN          Reviewed when blood work needs to be completed (initial and subsequent draws)    9/20/2022 Isa Joy RN          Reviewed when to call (temp greater than 100 4, uncontrolled vomiting, diarrhea, etc )    9/20/2022 Isa Joy RN          Scripts for support medication sent to pharmacy    9/20/2022 Isa Joy RN

## 2022-09-20 NOTE — PROGRESS NOTES
Hematology Outpatient Follow - Up Note  Daija Lim 80 y o  male MRN: @ Encounter: [id number]        Date:  9/20/2022        Assessment/ Plan:    59-year-old  male with superficial bladder cancers status post many BCG ease with refractoriness to BCG ease status post mitomycin instillation, cystoscopy on 04/2020 showed carcinoma in situ no evidence of invasive component     He received Pembrolizumab 200 mg flat dose every 3 weeks on 05/26/2020 after 2 cycles he had grade 3 elevation of the liver enzymes, thickening of the skin of the forearms consistent with toxicity to Pembrolizumab    Now with recurrent disease in the right iliac, pelvic area with extension into the aortic bifurcation     Evaluated at [address], we feel the patient is candidate for chemotherapy utilizing gemcitabine/carboplatin this is stage IV disease  Carboplatin AUC 4 day 1, gemcitabine 1000 mg per m2 on days 1 and 8 every 21 days is appropriate followed by immunotherapy different from [facility] such as avelumab    Imaging studies after 3 cycles of chemotherapy     Side effects such as neutropenia, infusion reaction, anaphylactic reaction, arthralgia etc  told he agreed to proceed        Labs and imaging studies are reviewed by ordering provider once results are available  If there are findings that need immediate attention, you will be contacted when results available  Discussing results and the implication on your healthcare is best discussed in person at your follow-up visit         HPI:    59-year-old  male with history of hypertension, coronary artery disease status post open heart surgery, diabetes mellitus type 2, diabetic neuropathy, he had a history of recurrent superficial bladder cancer status post many BCG unfortunately refractory to BCG, he had 1 time trans urethral resection of the bladder tumor and mitomycin instillation     Repeat cystoscopy on 04/16/2020 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, no evidence of invasive bladder cancers     He does not smoke, he drinks bourbon and vodka every night     Denies any headache blurred vision nausea vomiting diarrhea constipation dysuria hematuria melena hematochezia heat or cold intolerance  Initiated on Pembrolizumab 200 mg flat dose every 3 weeks on 05/26/2020, after 2 doses there was grade 3 elevation in the liver enzyme, alkaline phosphatase requiring discontinuation of Pembrolizumab, and later on normalization of the liver enzymes     Evaluated by ID there was no evidence of TB of the liver    Recurrence of disease on 09/2022 showed multiple suspicious-3 with the left arm in an extended internal iliac lymph nodes up to the aortoiliac bifurcation, evaluated at [address], he was not a candidate for surgical resection  Interval History:        Previous Treatment:         Test Results:    Imaging: No results found  Labs:   Lab Results   Component Value Date    WBC 10 39 (H) 05/16/2022    HGB 12 9 05/16/2022    HCT 39 9 05/16/2022    MCV 97 05/16/2022     05/16/2022     Lab Results   Component Value Date     08/13/2015    K 5 3 07/19/2022     (H) 07/19/2022    CO2 19 (L) 07/19/2022    ANIONGAP 10 08/13/2015    BUN 48 (H) 07/19/2022    CREATININE 2 25 (H) 07/19/2022    GLUCOSE 128 08/13/2015    GLUF 145 (H) 07/19/2022    CALCIUM 9 4 07/19/2022    CORRECTEDCA 9 7 08/02/2021    AST 13 11/29/2021    ALT 23 11/29/2021    ALKPHOS 89 11/29/2021    PROT 6 8 08/13/2015    BILITOT 0 58 08/13/2015    EGFR 26 07/19/2022       Lab Results   Component Value Date    IRON 45 (L) 09/27/2021    TIBC 283 09/27/2021    FERRITIN 88 09/27/2021       Lab Results   Component Value Date    [id number] 666 08/13/2015         ROS: Review of Systems   Constitutional: Negative  Negative for appetite change, chills, diaphoresis, fatigue, fever and unexpected weight change     HENT:   Negative for hearing loss, lump/mass, mouth sores, nosebleeds, sore throat, trouble swallowing and voice change  Eyes: Negative  Negative for eye problems and icterus  Respiratory: Negative  Negative for chest tightness, cough, hemoptysis and shortness of breath  Cardiovascular: Negative for chest pain and leg swelling  Gastrointestinal: Negative for abdominal distention, abdominal pain, blood in stool, constipation, diarrhea and nausea  Endocrine: Negative  Genitourinary: Negative for dysuria, frequency, hematuria and pelvic pain  Musculoskeletal: Negative  Negative for arthralgias, back pain, flank pain, gait problem, myalgias and neck stiffness  Skin: Negative for itching and rash  Neurological: Negative for dizziness, gait problem, headaches, light-headedness, numbness and speech difficulty  Hematological: Negative for adenopathy  Does not bruise/bleed easily  Psychiatric/Behavioral: Negative for confusion, decreased concentration, depression and sleep disturbance  The patient is not nervous/anxious  Current Medications: Reviewed  Allergies: Reviewed  PMH/FH/SH:  Reviewed      Physical Exam:    Body surface area is 1 97 meters squared  Wt Readings from Last 3 Encounters:   09/20/22 83 2 kg (183 lb 8 oz)   09/15/22 83 kg (183 lb)   08/11/22 83 6 kg (184 lb 3 2 oz)        Temp Readings from Last 3 Encounters:   09/20/22 (!) 97 °F (36 1 °C)   07/28/22 98 °F (36 7 °C) (Tympanic)   07/25/22 (!) 96 9 °F (36 1 °C) (Tympanic)        BP Readings from Last 3 Encounters:   09/20/22 152/90   09/15/22 140/70   08/11/22 140/72         Pulse Readings from Last 3 Encounters:   09/20/22 72   09/15/22 (!) 53   08/11/22 (!) 48        Physical Exam  Vitals reviewed  Constitutional:       General: He is not in acute distress  Appearance: He is well-developed  He is not diaphoretic  HENT:      Head: Normocephalic and atraumatic     Eyes:      Conjunctiva/sclera: Conjunctivae normal    Neck:      Trachea: No tracheal deviation  Cardiovascular:      Rate and Rhythm: Normal rate and regular rhythm  Heart sounds: No murmur heard  No friction rub  No gallop  Pulmonary:      Effort: Pulmonary effort is normal  No respiratory distress  Breath sounds: Normal breath sounds  No wheezing or rales  Chest:      Chest wall: No tenderness  Abdominal:      General: There is no distension  Palpations: Abdomen is soft  Tenderness: There is no abdominal tenderness  Musculoskeletal:      Cervical back: Normal range of motion and neck supple  Right lower leg: No edema  Left lower leg: No edema  Lymphadenopathy:      Cervical: No cervical adenopathy  Skin:     General: Skin is warm and dry  Coloration: Skin is not pale  Findings: No erythema  Neurological:      Mental Status: He is alert and oriented to person, place, and time  Psychiatric:         Behavior: Behavior normal          Thought Content: Thought content normal          Judgment: Judgment normal          ECO    Goals and Barriers:  Current Goal: Minimize effects of disease  Barriers: None  Patient's Capacity to Self Care:  Patient is able to self care      Code Status: [unfilled]

## 2022-09-21 NOTE — TELEPHONE ENCOUNTER
Good Morning! Can you please reach back out to patient for other options beyond Thursday afternoons as well as another location?

## 2022-09-27 ENCOUNTER — TELEPHONE (OUTPATIENT)
Dept: HEMATOLOGY ONCOLOGY | Facility: CLINIC | Age: 82
End: 2022-09-27

## 2022-09-27 ENCOUNTER — APPOINTMENT (OUTPATIENT)
Dept: LAB | Facility: CLINIC | Age: 82
End: 2022-09-27
Payer: MEDICARE

## 2022-09-27 DIAGNOSIS — C67.8 MALIGNANT NEOPLASM OF OVERLAPPING SITES OF BLADDER (HCC): ICD-10-CM

## 2022-09-27 LAB
ALBUMIN SERPL BCP-MCNC: 2.9 G/DL (ref 3.5–5)
ALP SERPL-CCNC: 90 U/L (ref 46–116)
ALT SERPL W P-5'-P-CCNC: 16 U/L (ref 12–78)
ANION GAP SERPL CALCULATED.3IONS-SCNC: 4 MMOL/L (ref 4–13)
AST SERPL W P-5'-P-CCNC: 9 U/L (ref 5–45)
BASOPHILS # BLD AUTO: 0.05 THOUSANDS/ΜL (ref 0–0.1)
BASOPHILS NFR BLD AUTO: 0 % (ref 0–1)
BILIRUB SERPL-MCNC: 0.38 MG/DL (ref 0.2–1)
BUN SERPL-MCNC: 55 MG/DL (ref 5–25)
CALCIUM ALBUM COR SERPL-MCNC: 10 MG/DL (ref 8.3–10.1)
CALCIUM SERPL-MCNC: 9.1 MG/DL (ref 8.3–10.1)
CHLORIDE SERPL-SCNC: 110 MMOL/L (ref 96–108)
CO2 SERPL-SCNC: 22 MMOL/L (ref 21–32)
CREAT SERPL-MCNC: 2.08 MG/DL (ref 0.6–1.3)
EOSINOPHIL # BLD AUTO: 0.15 THOUSAND/ΜL (ref 0–0.61)
EOSINOPHIL NFR BLD AUTO: 1 % (ref 0–6)
ERYTHROCYTE [DISTWIDTH] IN BLOOD BY AUTOMATED COUNT: 13.9 % (ref 11.6–15.1)
GFR SERPL CREATININE-BSD FRML MDRD: 28 ML/MIN/1.73SQ M
GLUCOSE SERPL-MCNC: 144 MG/DL (ref 65–140)
HCT VFR BLD AUTO: 40.3 % (ref 36.5–49.3)
HGB BLD-MCNC: 12.4 G/DL (ref 12–17)
IMM GRANULOCYTES # BLD AUTO: 0.07 THOUSAND/UL (ref 0–0.2)
IMM GRANULOCYTES NFR BLD AUTO: 1 % (ref 0–2)
LYMPHOCYTES # BLD AUTO: 1.15 THOUSANDS/ΜL (ref 0.6–4.47)
LYMPHOCYTES NFR BLD AUTO: 10 % (ref 14–44)
MCH RBC QN AUTO: 29.8 PG (ref 26.8–34.3)
MCHC RBC AUTO-ENTMCNC: 30.8 G/DL (ref 31.4–37.4)
MCV RBC AUTO: 97 FL (ref 82–98)
MONOCYTES # BLD AUTO: 0.91 THOUSAND/ΜL (ref 0.17–1.22)
MONOCYTES NFR BLD AUTO: 8 % (ref 4–12)
NEUTROPHILS # BLD AUTO: 9.07 THOUSANDS/ΜL (ref 1.85–7.62)
NEUTS SEG NFR BLD AUTO: 80 % (ref 43–75)
NRBC BLD AUTO-RTO: 0 /100 WBCS
PLATELET # BLD AUTO: 191 THOUSANDS/UL (ref 149–390)
PMV BLD AUTO: 12.4 FL (ref 8.9–12.7)
POTASSIUM SERPL-SCNC: 4.6 MMOL/L (ref 3.5–5.3)
PROT SERPL-MCNC: 7.1 G/DL (ref 6.4–8.4)
RBC # BLD AUTO: 4.16 MILLION/UL (ref 3.88–5.62)
SODIUM SERPL-SCNC: 136 MMOL/L (ref 135–147)
WBC # BLD AUTO: 11.4 THOUSAND/UL (ref 4.31–10.16)

## 2022-09-27 PROCEDURE — 80053 COMPREHEN METABOLIC PANEL: CPT

## 2022-09-27 PROCEDURE — 36415 COLL VENOUS BLD VENIPUNCTURE: CPT

## 2022-09-27 PROCEDURE — 85025 COMPLETE CBC W/AUTO DIFF WBC: CPT

## 2022-09-27 NOTE — TELEPHONE ENCOUNTER
Received attached VM  "Gurpreet Nj, this is [employer] calling  I'll have a question regarding my blood test for Thursday  A Thursday or Friday at the 29th, I believe it is  I just had a blood test today  For the oncology treatment  On the 30th and the 29th doesn't make sense to me  I'm just wondering if that can be fixed, can use the second time or whatever, but there's a possibility I could go to the lab on Saturday  Just wondering if that's something we can do  Please give me a shout when you can  I appreciate it  Thank you  It's [phone]  Myself  [phone] "    Spoke with patient and notified him of the need to reschedule his treatment as 10/4 treatment is too soon  Pt is aware to have labs completed about two days prior to treatment  Notified him I will have our schedulers work on adjusting his treatment  [facility]: I was speaking with Emilia Salcido about this earlier  Pt's apt dates after 9/30 need to be adjusted  Pt cannot have treatment on 10/4 as this is too soon

## 2022-09-28 RX ORDER — SODIUM CHLORIDE 9 MG/ML
20 INJECTION, SOLUTION INTRAVENOUS ONCE
Status: CANCELLED | OUTPATIENT
Start: 2022-09-30

## 2022-09-28 RX ORDER — SODIUM CHLORIDE 9 MG/ML
20 INJECTION, SOLUTION INTRAVENOUS ONCE
Status: CANCELLED | OUTPATIENT
Start: 2022-10-07

## 2022-09-28 NOTE — TELEPHONE ENCOUNTER
I called [facility] infusion center directly to schedule patient due to patients request  From telephone encounter on 9/20 patient stated he can go to treatments on Tuesdays/Thursdays and can go to either [facility], [facility], [facility] infusion if needed  I called patient today to go over schedule, now he refuses to travel else where but [facility] infusion  I made patient aware [facility] first available on a Thursday morning is not till 10/27  Patient stated he can keep [id number] as scheduled and will like to move other treatment dates to requested day, time and infusion center  I spoke with Vin Hernandez on the phone and inform her about patient request, Vin Hernandez rescheduled patient at his requested day, time and infusion  Patient was made aware of changes and confirmed all appointment  [facility]

## 2022-09-28 NOTE — TELEPHONE ENCOUNTER
From the previous dot phrase patient was also willing to go to [facility] infusion for days [facility] didn't have the availability  Can you please coordinate with [facility] till you have the availability on Thursday mornings after 10am      Thanks!

## 2022-09-28 NOTE — TELEPHONE ENCOUNTER
Good morning, I just got off the phone with patient  Patient confirmed cycle 1 D1 &D8  Per patient starting cycle 2 he will like to be scheduled on thursdays ideally after 10am  Please adjust schedule I will have dates changed if needed  Thank you!

## 2022-09-30 ENCOUNTER — HOSPITAL ENCOUNTER (OUTPATIENT)
Dept: INFUSION CENTER | Facility: HOSPITAL | Age: 82
End: 2022-09-30
Attending: INTERNAL MEDICINE
Payer: MEDICARE

## 2022-09-30 VITALS
SYSTOLIC BLOOD PRESSURE: 167 MMHG | BODY MASS INDEX: 25.11 KG/M2 | HEIGHT: 72 IN | TEMPERATURE: 98.2 F | RESPIRATION RATE: 18 BRPM | DIASTOLIC BLOOD PRESSURE: 74 MMHG | HEART RATE: 48 BPM | WEIGHT: 185.41 LBS

## 2022-09-30 DIAGNOSIS — C67.8 MALIGNANT NEOPLASM OF OVERLAPPING SITES OF BLADDER (HCC): Primary | ICD-10-CM

## 2022-09-30 PROCEDURE — 96413 CHEMO IV INFUSION 1 HR: CPT

## 2022-09-30 PROCEDURE — 96417 CHEMO IV INFUS EACH ADDL SEQ: CPT

## 2022-09-30 PROCEDURE — 96367 TX/PROPH/DG ADDL SEQ IV INF: CPT

## 2022-09-30 RX ORDER — SODIUM CHLORIDE 9 MG/ML
20 INJECTION, SOLUTION INTRAVENOUS ONCE
Status: COMPLETED | OUTPATIENT
Start: 2022-09-30 | End: 2022-09-30

## 2022-09-30 RX ADMIN — CARBOPLATIN 215.2 MG: 10 INJECTION, SOLUTION INTRAVENOUS at 15:13

## 2022-09-30 RX ADMIN — FOSAPREPITANT 150 MG: 150 INJECTION, POWDER, LYOPHILIZED, FOR SOLUTION INTRAVENOUS at 13:45

## 2022-09-30 RX ADMIN — GEMCITABINE 2000 MG: 38 INJECTION, SOLUTION INTRAVENOUS at 14:26

## 2022-09-30 RX ADMIN — DEXAMETHASONE SODIUM PHOSPHATE: 10 INJECTION, SOLUTION INTRAMUSCULAR; INTRAVENOUS at 13:13

## 2022-09-30 RX ADMIN — SODIUM CHLORIDE 20 ML/HR: 9 INJECTION, SOLUTION INTRAVENOUS at 13:13

## 2022-09-30 NOTE — PROGRESS NOTES
Pt received chemotherapy infusion without incident  AVS printed and provided to patient  Patient left, ambulatory, in stable condition

## 2022-10-03 ENCOUNTER — TELEPHONE (OUTPATIENT)
Dept: HEMATOLOGY ONCOLOGY | Facility: CLINIC | Age: 82
End: 2022-10-03

## 2022-10-03 NOTE — TELEPHONE ENCOUNTER
Received voicemail from pt:    Good morning, Alysa  This is [employer] calling  I just wanted to report to you I everything seems to be fine after that first chemo treatment  The only problem that has arisen is I now have a chest cold is congestion in my in my lungs and just wondering if there's any reason to be concerned about that or can we just go work right through it? Also, I have a bit of a sniffle in my head too, so just wanted to let know as per your instructions  So I'm at the [phone]  Thanks Alysa  Spoke with pt who states the majority of symptoms started 2-3 days ago  Covid negative  Denies fever  He will let me know if symptoms do not improve or he develops a fever and will go for pre-chemo blood work

## 2022-10-04 ENCOUNTER — APPOINTMENT (OUTPATIENT)
Dept: LAB | Facility: CLINIC | Age: 82
End: 2022-10-04
Payer: MEDICARE

## 2022-10-04 DIAGNOSIS — C67.8 MALIGNANT NEOPLASM OF OVERLAPPING SITES OF BLADDER (HCC): ICD-10-CM

## 2022-10-04 LAB
BASOPHILS # BLD MANUAL: 0.09 THOUSAND/UL (ref 0–0.1)
BASOPHILS NFR MAR MANUAL: 1 % (ref 0–1)
EOSINOPHIL # BLD MANUAL: 0.09 THOUSAND/UL (ref 0–0.4)
EOSINOPHIL NFR BLD MANUAL: 1 % (ref 0–6)
ERYTHROCYTE [DISTWIDTH] IN BLOOD BY AUTOMATED COUNT: 13.8 % (ref 11.6–15.1)
GIANT PLATELETS BLD QL SMEAR: PRESENT
HCT VFR BLD AUTO: 36.7 % (ref 36.5–49.3)
HGB BLD-MCNC: 11.7 G/DL (ref 12–17)
LYMPHOCYTES # BLD AUTO: 0.44 THOUSAND/UL (ref 0.6–4.47)
LYMPHOCYTES # BLD AUTO: 5 % (ref 14–44)
MCH RBC QN AUTO: 30.8 PG (ref 26.8–34.3)
MCHC RBC AUTO-ENTMCNC: 31.9 G/DL (ref 31.4–37.4)
MCV RBC AUTO: 97 FL (ref 82–98)
MONOCYTES # BLD AUTO: 0.09 THOUSAND/UL (ref 0–1.22)
MONOCYTES NFR BLD: 1 % (ref 4–12)
NEUTROPHILS # BLD MANUAL: 8.04 THOUSAND/UL (ref 1.85–7.62)
NEUTS SEG NFR BLD AUTO: 92 % (ref 43–75)
PLATELET # BLD AUTO: 162 THOUSANDS/UL (ref 149–390)
PLATELET BLD QL SMEAR: ADEQUATE
PMV BLD AUTO: 12 FL (ref 8.9–12.7)
POLYCHROMASIA BLD QL SMEAR: PRESENT
RBC # BLD AUTO: 3.8 MILLION/UL (ref 3.88–5.62)
RBC MORPH BLD: PRESENT
WBC # BLD AUTO: 8.74 THOUSAND/UL (ref 4.31–10.16)

## 2022-10-04 PROCEDURE — 85027 COMPLETE CBC AUTOMATED: CPT

## 2022-10-04 PROCEDURE — 85007 BL SMEAR W/DIFF WBC COUNT: CPT

## 2022-10-04 PROCEDURE — 36415 COLL VENOUS BLD VENIPUNCTURE: CPT

## 2022-10-07 ENCOUNTER — HOSPITAL ENCOUNTER (OUTPATIENT)
Dept: INFUSION CENTER | Facility: HOSPITAL | Age: 82
End: 2022-10-07
Attending: INTERNAL MEDICINE
Payer: MEDICARE

## 2022-10-07 VITALS
BODY MASS INDEX: 24.61 KG/M2 | RESPIRATION RATE: 18 BRPM | WEIGHT: 181.66 LBS | HEART RATE: 55 BPM | TEMPERATURE: 97.8 F | SYSTOLIC BLOOD PRESSURE: 145 MMHG | DIASTOLIC BLOOD PRESSURE: 66 MMHG | HEIGHT: 72 IN

## 2022-10-07 DIAGNOSIS — C67.8 MALIGNANT NEOPLASM OF OVERLAPPING SITES OF BLADDER (HCC): Primary | ICD-10-CM

## 2022-10-07 PROCEDURE — 96413 CHEMO IV INFUSION 1 HR: CPT

## 2022-10-07 PROCEDURE — 96367 TX/PROPH/DG ADDL SEQ IV INF: CPT

## 2022-10-07 RX ORDER — SODIUM CHLORIDE 9 MG/ML
20 INJECTION, SOLUTION INTRAVENOUS ONCE
Status: COMPLETED | OUTPATIENT
Start: 2022-10-07 | End: 2022-10-07

## 2022-10-07 RX ADMIN — SODIUM CHLORIDE 20 ML/HR: 0.9 INJECTION, SOLUTION INTRAVENOUS at 10:37

## 2022-10-07 RX ADMIN — ONDANSETRON 8 MG: 2 INJECTION INTRAMUSCULAR; INTRAVENOUS at 10:37

## 2022-10-07 RX ADMIN — GEMCITABINE 2000 MG: 38 INJECTION, SOLUTION INTRAVENOUS at 11:15

## 2022-10-11 PROBLEM — Z00.00 HEALTHCARE MAINTENANCE: Status: RESOLVED | Noted: 2019-05-23 | Resolved: 2022-10-11

## 2022-10-12 PROBLEM — A41.9 SEPSIS (HCC): Status: RESOLVED | Noted: 2021-02-19 | Resolved: 2022-10-12

## 2022-10-17 DIAGNOSIS — E87.20 METABOLIC ACIDOSIS: ICD-10-CM

## 2022-10-17 RX ORDER — SODIUM BICARBONATE 650 MG/1
TABLET ORAL
Qty: 360 TABLET | Refills: 3 | Status: SHIPPED | OUTPATIENT
Start: 2022-10-17

## 2022-10-18 ENCOUNTER — APPOINTMENT (OUTPATIENT)
Dept: LAB | Facility: CLINIC | Age: 82
End: 2022-10-18
Payer: MEDICARE

## 2022-10-18 DIAGNOSIS — C67.8 MALIGNANT NEOPLASM OF OVERLAPPING SITES OF BLADDER (HCC): ICD-10-CM

## 2022-10-18 LAB
ALBUMIN SERPL BCP-MCNC: 3.3 G/DL (ref 3.5–5)
ALP SERPL-CCNC: 110 U/L (ref 46–116)
ALT SERPL W P-5'-P-CCNC: 56 U/L (ref 12–78)
ANION GAP SERPL CALCULATED.3IONS-SCNC: 5 MMOL/L (ref 4–13)
AST SERPL W P-5'-P-CCNC: 26 U/L (ref 5–45)
BASOPHILS # BLD AUTO: 0.01 THOUSANDS/ΜL (ref 0–0.1)
BASOPHILS NFR BLD AUTO: 0 % (ref 0–1)
BILIRUB SERPL-MCNC: 0.39 MG/DL (ref 0.2–1)
BUN SERPL-MCNC: 43 MG/DL (ref 5–25)
CALCIUM ALBUM COR SERPL-MCNC: 9.6 MG/DL (ref 8.3–10.1)
CALCIUM SERPL-MCNC: 9 MG/DL (ref 8.3–10.1)
CHLORIDE SERPL-SCNC: 111 MMOL/L (ref 96–108)
CO2 SERPL-SCNC: 23 MMOL/L (ref 21–32)
CREAT SERPL-MCNC: 2.19 MG/DL (ref 0.6–1.3)
EOSINOPHIL # BLD AUTO: 0.05 THOUSAND/ΜL (ref 0–0.61)
EOSINOPHIL NFR BLD AUTO: 1 % (ref 0–6)
ERYTHROCYTE [DISTWIDTH] IN BLOOD BY AUTOMATED COUNT: 14.4 % (ref 11.6–15.1)
GFR SERPL CREATININE-BSD FRML MDRD: 27 ML/MIN/1.73SQ M
GLUCOSE SERPL-MCNC: 187 MG/DL (ref 65–140)
HCT VFR BLD AUTO: 33 % (ref 36.5–49.3)
HGB BLD-MCNC: 10.6 G/DL (ref 12–17)
IMM GRANULOCYTES # BLD AUTO: 0.06 THOUSAND/UL (ref 0–0.2)
IMM GRANULOCYTES NFR BLD AUTO: 2 % (ref 0–2)
LYMPHOCYTES # BLD AUTO: 0.74 THOUSANDS/ΜL (ref 0.6–4.47)
LYMPHOCYTES NFR BLD AUTO: 19 % (ref 14–44)
MCH RBC QN AUTO: 30.5 PG (ref 26.8–34.3)
MCHC RBC AUTO-ENTMCNC: 32.1 G/DL (ref 31.4–37.4)
MCV RBC AUTO: 95 FL (ref 82–98)
MONOCYTES # BLD AUTO: 0.52 THOUSAND/ΜL (ref 0.17–1.22)
MONOCYTES NFR BLD AUTO: 13 % (ref 4–12)
NEUTROPHILS # BLD AUTO: 2.54 THOUSANDS/ΜL (ref 1.85–7.62)
NEUTS SEG NFR BLD AUTO: 65 % (ref 43–75)
NRBC BLD AUTO-RTO: 0 /100 WBCS
PLATELET # BLD AUTO: 116 THOUSANDS/UL (ref 149–390)
PMV BLD AUTO: 11.8 FL (ref 8.9–12.7)
POTASSIUM SERPL-SCNC: 4.6 MMOL/L (ref 3.5–5.3)
PROT SERPL-MCNC: 6.9 G/DL (ref 6.4–8.4)
RBC # BLD AUTO: 3.47 MILLION/UL (ref 3.88–5.62)
SODIUM SERPL-SCNC: 139 MMOL/L (ref 135–147)
WBC # BLD AUTO: 3.92 THOUSAND/UL (ref 4.31–10.16)

## 2022-10-18 PROCEDURE — 36415 COLL VENOUS BLD VENIPUNCTURE: CPT

## 2022-10-18 PROCEDURE — 80053 COMPREHEN METABOLIC PANEL: CPT

## 2022-10-18 PROCEDURE — 85025 COMPLETE CBC W/AUTO DIFF WBC: CPT

## 2022-10-19 ENCOUNTER — FOLLOW UP (OUTPATIENT)
Dept: URBAN - METROPOLITAN AREA CLINIC 6 | Facility: CLINIC | Age: 82
End: 2022-10-19

## 2022-10-19 DIAGNOSIS — H35.3114: ICD-10-CM

## 2022-10-19 DIAGNOSIS — E11.3293: ICD-10-CM

## 2022-10-19 DIAGNOSIS — H25.13: ICD-10-CM

## 2022-10-19 LAB
LEFT EYE DIABETIC RETINOPATHY: POSITIVE
RIGHT EYE DIABETIC RETINOPATHY: POSITIVE
SEVERITY (EYE EXAM): NORMAL

## 2022-10-19 PROCEDURE — 92134 CPTRZ OPH DX IMG PST SGM RTA: CPT

## 2022-10-19 PROCEDURE — 92014 COMPRE OPH EXAM EST PT 1/>: CPT

## 2022-10-19 RX ORDER — SODIUM CHLORIDE 9 MG/ML
20 INJECTION, SOLUTION INTRAVENOUS ONCE
Status: CANCELLED | OUTPATIENT
Start: 2022-10-27

## 2022-10-19 RX ORDER — SODIUM CHLORIDE 9 MG/ML
20 INJECTION, SOLUTION INTRAVENOUS ONCE
Status: CANCELLED | OUTPATIENT
Start: 2022-10-21

## 2022-10-19 ASSESSMENT — TONOMETRY
OD_IOP_MMHG: 15
OS_IOP_MMHG: 13

## 2022-10-19 ASSESSMENT — VISUAL ACUITY: OS_CC: 20/80

## 2022-10-21 ENCOUNTER — HOSPITAL ENCOUNTER (OUTPATIENT)
Dept: INFUSION CENTER | Facility: HOSPITAL | Age: 82
End: 2022-10-21
Attending: INTERNAL MEDICINE
Payer: MEDICARE

## 2022-10-21 VITALS
DIASTOLIC BLOOD PRESSURE: 73 MMHG | BODY MASS INDEX: 25.71 KG/M2 | HEIGHT: 72 IN | SYSTOLIC BLOOD PRESSURE: 168 MMHG | TEMPERATURE: 97.6 F | WEIGHT: 189.82 LBS | RESPIRATION RATE: 18 BRPM | HEART RATE: 53 BPM

## 2022-10-21 DIAGNOSIS — C67.8 MALIGNANT NEOPLASM OF OVERLAPPING SITES OF BLADDER (HCC): Primary | ICD-10-CM

## 2022-10-21 PROCEDURE — 96413 CHEMO IV INFUSION 1 HR: CPT

## 2022-10-21 PROCEDURE — 96417 CHEMO IV INFUS EACH ADDL SEQ: CPT

## 2022-10-21 PROCEDURE — 96367 TX/PROPH/DG ADDL SEQ IV INF: CPT

## 2022-10-21 RX ORDER — SODIUM CHLORIDE 9 MG/ML
20 INJECTION, SOLUTION INTRAVENOUS ONCE
Status: COMPLETED | OUTPATIENT
Start: 2022-10-21 | End: 2022-10-21

## 2022-10-21 RX ADMIN — SODIUM CHLORIDE 20 ML/HR: 0.9 INJECTION, SOLUTION INTRAVENOUS at 12:58

## 2022-10-21 RX ADMIN — GEMCITABINE 2000 MG: 38 INJECTION, SOLUTION INTRAVENOUS at 14:01

## 2022-10-21 RX ADMIN — FOSAPREPITANT 150 MG: 150 INJECTION, POWDER, LYOPHILIZED, FOR SOLUTION INTRAVENOUS at 13:23

## 2022-10-21 RX ADMIN — DEXAMETHASONE SODIUM PHOSPHATE: 10 INJECTION, SOLUTION INTRAMUSCULAR; INTRAVENOUS at 12:58

## 2022-10-21 RX ADMIN — CARBOPLATIN 209.2 MG: 10 INJECTION, SOLUTION INTRAVENOUS at 14:32

## 2022-10-24 ENCOUNTER — ESTABLISHED COMPREHENSIVE EXAM (OUTPATIENT)
Dept: URBAN - METROPOLITAN AREA CLINIC 6 | Facility: CLINIC | Age: 82
End: 2022-10-24

## 2022-10-24 DIAGNOSIS — E11.3293: ICD-10-CM

## 2022-10-24 DIAGNOSIS — H35.3124: ICD-10-CM

## 2022-10-24 DIAGNOSIS — H25.13: ICD-10-CM

## 2022-10-24 DIAGNOSIS — H35.3211: ICD-10-CM

## 2022-10-24 PROCEDURE — 67220 TREATMENT OF CHOROID LESION: CPT

## 2022-10-24 PROCEDURE — 99214 OFFICE O/P EST MOD 30 MIN: CPT | Mod: 57

## 2022-10-24 PROCEDURE — 92134 CPTRZ OPH DX IMG PST SGM RTA: CPT

## 2022-10-24 ASSESSMENT — TONOMETRY
OD_IOP_MMHG: 14
OS_IOP_MMHG: 14

## 2022-10-24 ASSESSMENT — VISUAL ACUITY: OS_CC: 20/70

## 2022-10-25 ENCOUNTER — APPOINTMENT (OUTPATIENT)
Dept: LAB | Facility: CLINIC | Age: 82
End: 2022-10-25
Payer: MEDICARE

## 2022-10-25 DIAGNOSIS — E11.59 TYPE 2 DIABETES MELLITUS WITH OTHER CIRCULATORY COMPLICATION, WITHOUT LONG-TERM CURRENT USE OF INSULIN (HCC): ICD-10-CM

## 2022-10-25 DIAGNOSIS — C67.8 MALIGNANT NEOPLASM OF OVERLAPPING SITES OF BLADDER (HCC): ICD-10-CM

## 2022-10-25 DIAGNOSIS — Z00.00 HEALTHCARE MAINTENANCE: ICD-10-CM

## 2022-10-25 LAB
ALBUMIN SERPL BCP-MCNC: 3.2 G/DL (ref 3.5–5)
ALP SERPL-CCNC: 97 U/L (ref 46–116)
ALT SERPL W P-5'-P-CCNC: 105 U/L (ref 12–78)
ANION GAP SERPL CALCULATED.3IONS-SCNC: 4 MMOL/L (ref 4–13)
AST SERPL W P-5'-P-CCNC: 74 U/L (ref 5–45)
BASOPHILS # BLD AUTO: 0.03 THOUSANDS/ÂΜL (ref 0–0.1)
BASOPHILS NFR BLD AUTO: 1 % (ref 0–1)
BILIRUB SERPL-MCNC: 0.57 MG/DL (ref 0.2–1)
BUN SERPL-MCNC: 50 MG/DL (ref 5–25)
CALCIUM ALBUM COR SERPL-MCNC: 10.1 MG/DL (ref 8.3–10.1)
CALCIUM SERPL-MCNC: 9.5 MG/DL (ref 8.3–10.1)
CHLORIDE SERPL-SCNC: 111 MMOL/L (ref 96–108)
CHOLEST SERPL-MCNC: 123 MG/DL
CO2 SERPL-SCNC: 22 MMOL/L (ref 21–32)
CREAT SERPL-MCNC: 2.24 MG/DL (ref 0.6–1.3)
CREAT UR-MCNC: 39.3 MG/DL
EOSINOPHIL # BLD AUTO: 0.16 THOUSAND/ÂΜL (ref 0–0.61)
EOSINOPHIL NFR BLD AUTO: 4 % (ref 0–6)
ERYTHROCYTE [DISTWIDTH] IN BLOOD BY AUTOMATED COUNT: 15.7 % (ref 11.6–15.1)
EST. AVERAGE GLUCOSE BLD GHB EST-MCNC: 140 MG/DL
GFR SERPL CREATININE-BSD FRML MDRD: 26 ML/MIN/1.73SQ M
GLUCOSE P FAST SERPL-MCNC: 151 MG/DL (ref 65–99)
HBA1C MFR BLD: 6.5 %
HCT VFR BLD AUTO: 33.2 % (ref 36.5–49.3)
HDLC SERPL-MCNC: 61 MG/DL
HGB BLD-MCNC: 10.8 G/DL (ref 12–17)
IMM GRANULOCYTES # BLD AUTO: 0.02 THOUSAND/UL (ref 0–0.2)
IMM GRANULOCYTES NFR BLD AUTO: 1 % (ref 0–2)
LDLC SERPL CALC-MCNC: 46 MG/DL (ref 0–100)
LYMPHOCYTES # BLD AUTO: 0.61 THOUSANDS/ÂΜL (ref 0.6–4.47)
LYMPHOCYTES NFR BLD AUTO: 14 % (ref 14–44)
MCH RBC QN AUTO: 31.1 PG (ref 26.8–34.3)
MCHC RBC AUTO-ENTMCNC: 32.5 G/DL (ref 31.4–37.4)
MCV RBC AUTO: 96 FL (ref 82–98)
MICROALBUMIN UR-MCNC: 67.2 MG/L (ref 0–20)
MICROALBUMIN/CREAT 24H UR: 171 MG/G CREATININE (ref 0–30)
MONOCYTES # BLD AUTO: 0.21 THOUSAND/ÂΜL (ref 0.17–1.22)
MONOCYTES NFR BLD AUTO: 5 % (ref 4–12)
NEUTROPHILS # BLD AUTO: 3.41 THOUSANDS/ÂΜL (ref 1.85–7.62)
NEUTS SEG NFR BLD AUTO: 75 % (ref 43–75)
NONHDLC SERPL-MCNC: 62 MG/DL
NRBC BLD AUTO-RTO: 0 /100 WBCS
PLATELET # BLD AUTO: 425 THOUSANDS/UL (ref 149–390)
PMV BLD AUTO: 11.9 FL (ref 8.9–12.7)
POTASSIUM SERPL-SCNC: 5.2 MMOL/L (ref 3.5–5.3)
PROT SERPL-MCNC: 7.3 G/DL (ref 6.4–8.4)
RBC # BLD AUTO: 3.47 MILLION/UL (ref 3.88–5.62)
SODIUM SERPL-SCNC: 137 MMOL/L (ref 135–147)
TRIGL SERPL-MCNC: 80 MG/DL
WBC # BLD AUTO: 4.44 THOUSAND/UL (ref 4.31–10.16)

## 2022-10-25 PROCEDURE — 83036 HEMOGLOBIN GLYCOSYLATED A1C: CPT

## 2022-10-25 PROCEDURE — 80053 COMPREHEN METABOLIC PANEL: CPT

## 2022-10-25 PROCEDURE — 82043 UR ALBUMIN QUANTITATIVE: CPT | Performed by: INTERNAL MEDICINE

## 2022-10-25 PROCEDURE — 36415 COLL VENOUS BLD VENIPUNCTURE: CPT

## 2022-10-25 PROCEDURE — 82570 ASSAY OF URINE CREATININE: CPT | Performed by: INTERNAL MEDICINE

## 2022-10-25 PROCEDURE — 85025 COMPLETE CBC W/AUTO DIFF WBC: CPT

## 2022-10-25 PROCEDURE — 80061 LIPID PANEL: CPT

## 2022-10-26 ENCOUNTER — DOCUMENTATION (OUTPATIENT)
Dept: HEMATOLOGY ONCOLOGY | Facility: CLINIC | Age: 82
End: 2022-10-26

## 2022-10-26 NOTE — PROGRESS NOTES
Reviewed AST and ALT results from 10/25/2022 and treatment of Gemzar 1000 mg/m2 planned for tomorrow with Dr Michelle Kasper  Per Dr Michelle Kasper okay to treat tomorrow with elevated AST and ALT

## 2022-10-27 ENCOUNTER — HOSPITAL ENCOUNTER (OUTPATIENT)
Dept: INFUSION CENTER | Facility: HOSPITAL | Age: 82
Discharge: HOME | End: 2022-10-27
Attending: INTERNAL MEDICINE

## 2022-10-27 ENCOUNTER — OFFICE VISIT (OUTPATIENT)
Dept: HEMATOLOGY ONCOLOGY | Facility: CLINIC | Age: 82
End: 2022-10-27
Payer: MEDICARE

## 2022-10-27 VITALS
WEIGHT: 191.58 LBS | HEIGHT: 71 IN | RESPIRATION RATE: 16 BRPM | SYSTOLIC BLOOD PRESSURE: 146 MMHG | HEART RATE: 76 BPM | DIASTOLIC BLOOD PRESSURE: 78 MMHG | TEMPERATURE: 97.3 F | BODY MASS INDEX: 26.82 KG/M2

## 2022-10-27 VITALS
SYSTOLIC BLOOD PRESSURE: 150 MMHG | DIASTOLIC BLOOD PRESSURE: 60 MMHG | TEMPERATURE: 97.5 F | OXYGEN SATURATION: 94 % | HEIGHT: 71 IN | HEART RATE: 78 BPM | BODY MASS INDEX: 26.74 KG/M2 | WEIGHT: 191 LBS | RESPIRATION RATE: 16 BRPM

## 2022-10-27 DIAGNOSIS — C67.8 MALIGNANT NEOPLASM OF OVERLAPPING SITES OF BLADDER (HCC): Primary | ICD-10-CM

## 2022-10-27 PROCEDURE — 99214 OFFICE O/P EST MOD 30 MIN: CPT | Performed by: INTERNAL MEDICINE

## 2022-10-27 RX ORDER — SODIUM CHLORIDE 9 MG/ML
20 INJECTION, SOLUTION INTRAVENOUS ONCE
Status: COMPLETED | OUTPATIENT
Start: 2022-10-27 | End: 2022-10-27

## 2022-10-27 RX ADMIN — GEMCITABINE 2000 MG: 38 INJECTION, SOLUTION INTRAVENOUS at 10:49

## 2022-10-27 RX ADMIN — ONDANSETRON 8 MG: 2 INJECTION INTRAMUSCULAR; INTRAVENOUS at 10:04

## 2022-10-27 RX ADMIN — SODIUM CHLORIDE 20 ML/HR: 0.9 INJECTION, SOLUTION INTRAVENOUS at 10:04

## 2022-10-27 NOTE — PROGRESS NOTES
Hematology Outpatient Follow - Up Note  Payal Bonner 80 y o  male MRN: @ Encounter: [id number]        Date:  10/27/2022        Assessment/ Plan:    80-year-old  male with superficial bladder cancers status post many BCG ease with refractoriness to BCG ease status post mitomycin instillation, cystoscopy on 04/2020 showed carcinoma in situ no evidence of invasive component     He received Pembrolizumab 200 mg flat dose every 3 weeks on 05/26/2020 after 2 cycles he had grade 3 elevation of the liver enzymes, thickening of the skin of the forearms consistent with toxicity to Pembrolizumab     Now with recurrent disease in the right iliac, pelvic area with extension into the aortic bifurcation      Evaluated at [address], we feel the patient is candidate for chemotherapy utilizing gemcitabine/carboplatin this is stage IV disease  Initiated on carboplatin/gemcitabine on 09/30/2022, after 3 cycles will do CT scan of the abdomen and pelvis without IV contrast to assess response   Molecular tests showed TERT ARID1A mutation which he might be a candidate for PARP inhibitor in the future          Labs and imaging studies are reviewed by ordering provider once results are available  If there are findings that need immediate attention, you will be contacted when results available  Discussing results and the implication on your healthcare is best discussed in person at your follow-up visit         HPI:    80-year-old  male with history of hypertension, coronary artery disease status post open heart surgery, diabetes mellitus type 2, diabetic neuropathy, he had a history of recurrent superficial bladder cancer status post many BCG unfortunately refractory to BCG, he had 1 time trans urethral resection of the bladder tumor and mitomycin instillation     Repeat cystoscopy on 04/16/2020 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, no evidence of invasive bladder cancers     He does not smoke, he drinks bourbon and vodka every night     Denies any headache blurred vision nausea vomiting diarrhea constipation dysuria hematuria melena hematochezia heat or cold intolerance  Initiated on Pembrolizumab 200 mg flat dose every 3 weeks on 05/26/2020, after 2 doses there was grade 3 elevation in the liver enzyme, alkaline phosphatase requiring discontinuation of Pembrolizumab, and later on normalization of the liver enzymes     Evaluated by ID there was no evidence of TB of the liver     Recurrence of disease on 09/2022 showed multiple suspicious enlarged lymph nodes that are extended internal iliac lymph nodes up to the aortoiliac bifurcation, evaluated at [address], he was not a candidate for surgical resection  Interval History:    Initiated on gemcitabine/carboplatin 09/30/2022 2 weeks on 1 week off    Previous Treatment:      A Combined Positive Score (CPS) for PD-L1 (22c3) and Immune cell staining for PD-L1 () cannot be evaluated in the current tumor sample due to the lack of  stromal invasion by the tumor    Cancer-Type Relevant Biomarkers  Biomarker Method Analyte Result  BRAF Seq DNA-Tumor Mutation Not Detected  MSI Seq DNA-Tumor Stable  Mismatch Repair  Status  IHC Protein Proficient  NTRK1/2/3 Seq RNA-Tumor Fusion Not Detected  Tumor Mutational  [place]  Seq DNA-Tumor Low, 6 mut/Mb  CNA-Seq DNA-Tumor Deletion Not Detected  MIQUEL  Seq DNA-Tumor Mutation Not Detected  ERBB2 (Her2/Isabelle) Seq DNA-Tumor Mutation Not Detected  CNA-Seq DNA-Tumor Deletion Not Detected  ERCC2  Seq DNA-Tumor Mutation Not Detected  CNA-Seq DNA-Tumor Deletion Not Detected  [facility]  Seq DNA-Tumor Mutation Not Detected  Biomarker Method Analyte Result  FGFR1 Seq RNA-Tumor Fusion Not Detected  DNA-Tumor Mutation Not Detected  FGFR2 Seq  RNA-Tumor Fusion Not Detected  DNA-Tumor Mutation Not Detected  FGFR3 Seq  RNA-Tumor Fusion Not Detected  MTAP CNA-Seq DNA-Tumor Deletion Not Detected  PD-L1 (22c3) IHC Protein See Notes  PD-L1 () IHC Protein See Notes  CNA-Seq DNA-Tumor Deletion Not Detected  TSC1  Seq DNA-Tumor Mutation Not Detected  O T H E R F I N D I N G S (see below for additional results)  TERT promoter Seq DNA-Tumor  Pathogenic Variant     Genes Tested with Pathogenic or Likely Pathogenic Alterations  Gene Method Analyte Variant Interpretation Protein Alteration Exon DNA Alteration Variant  Frequency %  ARID1A Seq DNA-Tumor Pathogenic Variant p * 18 c 4843A>T 42  DDR2 CNA-Seq DNA-Tumor Amplified - - - -  MGA Seq DNA-Tumor Pathogenic Variant p Q604* 3 c 1810C>T 51  RB1 Seq DNA-Tumor Pathogenic Variant c 2454 _[id number] 23 c 2454 _2489+2del38 50  STAG2 Seq DNA-Tumor Pathogenic Variant p  E523* 17 c 1567G>T 5  TERT Seq DNA-Tumor Pathogenic Variant - - c -124C>T 53  TP53 Seq DNA-Tumor Pathogenic Variant p R280T 8 c 839G>C 59  Test Results:    Imaging: No results found  Labs:   Lab Results   Component Value Date    WBC 4 44 10/25/2022    HGB 10 8 (L) 10/25/2022    HCT 33 2 (L) 10/25/2022    MCV 96 10/25/2022     (H) 10/25/2022     Lab Results   Component Value Date     08/13/2015    K 5 2 10/25/2022     (H) 10/25/2022    CO2 22 10/25/2022    ANIONGAP 10 08/13/2015    BUN 50 (H) 10/25/2022    CREATININE 2 24 (H) 10/25/2022    GLUCOSE 128 08/13/2015    GLUF 151 (H) 10/25/2022    CALCIUM 9 5 10/25/2022    CORRECTEDCA 10 1 10/25/2022    AST 74 (H) 10/25/2022     (H) 10/25/2022    ALKPHOS 97 10/25/2022    PROT 6 8 08/13/2015    BILITOT 0 58 08/13/2015    EGFR 26 10/25/2022       Lab Results   Component Value Date    IRON 45 (L) 09/27/2021    TIBC 283 09/27/2021    FERRITIN 88 09/27/2021       Lab Results   Component Value Date    [id number] 666 08/13/2015         ROS: Review of Systems   Constitutional: Negative  Negative for appetite change, chills, diaphoresis, fatigue, fever and unexpected weight change     HENT:   Negative for hearing loss, lump/mass, mouth sores, nosebleeds, sore throat, trouble swallowing and voice change  Eyes: Negative  Negative for eye problems and icterus  Respiratory: Negative  Negative for chest tightness, cough, hemoptysis and shortness of breath  Cardiovascular: Negative for chest pain and leg swelling  Gastrointestinal: Negative for abdominal distention, abdominal pain, blood in stool, constipation, diarrhea and nausea  Endocrine: Negative  Genitourinary: Negative for dysuria, frequency, hematuria and pelvic pain  Musculoskeletal: Negative  Negative for arthralgias, back pain, flank pain, gait problem, myalgias and neck stiffness  Skin: Negative for itching and rash  Neurological: Negative for dizziness, gait problem, headaches, light-headedness, numbness and speech difficulty  Hematological: Negative for adenopathy  Does not bruise/bleed easily  Psychiatric/Behavioral: Negative for confusion, decreased concentration, depression and sleep disturbance  The patient is not nervous/anxious  Current Medications: Reviewed  Allergies: Reviewed  PMH/FH/SH:  Reviewed      Physical Exam:    Body surface area is 2 07 meters squared  Wt Readings from Last 3 Encounters:   10/27/22 86 6 kg (191 lb)   10/21/22 86 1 kg (189 lb 13 1 oz)   10/07/22 82 4 kg (181 lb 10 5 oz)        Temp Readings from Last 3 Encounters:   10/27/22 97 5 °F (36 4 °C)   10/21/22 97 6 °F (36 4 °C)   10/07/22 97 8 °F (36 6 °C) (Temporal)        BP Readings from Last 3 Encounters:   10/27/22 150/60   10/21/22 168/73   10/07/22 145/66         Pulse Readings from Last 3 Encounters:   10/27/22 78   10/21/22 (!) 53   10/07/22 55        Physical Exam  Vitals reviewed  Constitutional:       General: He is not in acute distress  Appearance: He is well-developed  He is not diaphoretic  HENT:      Head: Normocephalic and atraumatic     Eyes:      Conjunctiva/sclera: Conjunctivae normal    Neck:      Trachea: No tracheal deviation  Cardiovascular:      Rate and Rhythm: Normal rate and regular rhythm  Heart sounds: No murmur heard  No friction rub  No gallop  Pulmonary:      Effort: Pulmonary effort is normal  No respiratory distress  Breath sounds: Normal breath sounds  No wheezing or rales  Chest:      Chest wall: No tenderness  Abdominal:      General: There is no distension  Palpations: Abdomen is soft  Tenderness: There is no abdominal tenderness  Musculoskeletal:      Cervical back: Normal range of motion and neck supple  Lymphadenopathy:      Cervical: No cervical adenopathy  Skin:     General: Skin is warm and dry  Coloration: Skin is not pale  Findings: No erythema  Neurological:      Mental Status: He is alert and oriented to person, place, and time  Psychiatric:         Behavior: Behavior normal          Thought Content: Thought content normal          Judgment: Judgment normal          ECO  Goals and Barriers:  Current Goal: Minimize effects of disease  Barriers: None  Patient's Capacity to Self Care:  Patient is able to self care      Code Status: [unfilled]

## 2022-10-31 DIAGNOSIS — F32.A ANXIETY AND DEPRESSION: ICD-10-CM

## 2022-10-31 DIAGNOSIS — F41.9 ANXIETY AND DEPRESSION: ICD-10-CM

## 2022-10-31 RX ORDER — CITALOPRAM 20 MG/1
TABLET ORAL
Qty: 30 TABLET | Refills: 3 | Status: SHIPPED | OUTPATIENT
Start: 2022-10-31

## 2022-11-04 ENCOUNTER — OFFICE VISIT (OUTPATIENT)
Dept: UROLOGY | Facility: AMBULATORY SURGERY CENTER | Age: 82
End: 2022-11-04

## 2022-11-04 VITALS
WEIGHT: 190 LBS | HEIGHT: 71 IN | SYSTOLIC BLOOD PRESSURE: 142 MMHG | RESPIRATION RATE: 18 BRPM | HEART RATE: 60 BPM | OXYGEN SATURATION: 96 % | DIASTOLIC BLOOD PRESSURE: 78 MMHG | BODY MASS INDEX: 26.6 KG/M2

## 2022-11-04 DIAGNOSIS — D49.59 URETHRAL TUMOR: Primary | ICD-10-CM

## 2022-11-04 DIAGNOSIS — C67.8 MALIGNANT NEOPLASM OF OVERLAPPING SITES OF BLADDER (HCC): ICD-10-CM

## 2022-11-04 NOTE — PROGRESS NOTES
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      Discussion  I again had a lengthy discussion with Sierra Flowers and his wife Luis Aviles in the office today  At this time Dr Radha Dang at the [facility] does not feel that he is a surgical candidate and I concur as he has iliac adenopathy  I explained to the patient and his wife that systemic disease is treated with systemic therapy  He will continue to follow with Dr Barrett Arambula to receive his platinum-based chemotherapy  His next CT scan is scheduled at the end of November 2022  He will return in follow-up in December 2022 so that we can assess the efficacy of his chemotherapy  Fortunately he does not have any blood per urethra  I explained to the patient that if he is bleeding from the urethra this would likely indicate regrowth of tumor and he may require second-look urethroscopy in the operating room with fulguration of any active bleeding lesions  Patient is amenable with this plan  History of Present Illness  80 y o  male with a 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  This prompted a radical cystoprostatectomy with ileal conduit creation that was performed in 2020 at the [address] [place] [address]  The patient returned to our practice for surveillance follow-up  I performed urethroscopy previously and found carpeting of tumor along his urethra  He subsequent went to the operating room where I performed a urethral biopsy along with fulguration  Pathology was concerning for 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  I refer the patient back to the [address] [place] [address]    His CT scan from the fall of 2022 to showed multiple suspicious enlarged lymph nodes along the iliac chain  He likely has stage IV disease  He has been receiving gemcitabine and carboplatin  Previously he had been on pembrolizumab  Just prior to his scheduled urethrectomy the patient had a CT scan which showed iliac adenopathy  This was in September of 2022  He has since started his chemotherapy and his surgery was canceled  He denies any constitutional signs  He states that he has actually gained a small amount of weight recently  He has no issues with his stoma or ileal conduit  AUA Symptom Score  AUA SYMPTOM SCORE    Flowsheet Row Most Recent Value   AUA SYMPTOM SCORE    How often have you had a sensation of not emptying your bladder completely after you finished urinating? 0 (P)     How often have you had to urinate again less than two hours after you finished urinating? 0 (P)     How often have you found you stopped and started again several times when you urinate? 0 (P)     How often have you found it difficult to postpone urination? 0 (P)     How often have you had a weak urinary stream? 0 (P)     How often have you had to push or strain to begin urination? 0 (P)     How many times did you most typically get up to urinate from the time you went to bed at night until the time you got up in the morning? 0 (P)     Quality of Life: If you were to spend the rest of your life with your urinary condition just the way it is now, how would you feel about that? 4 (P)     AUA SYMPTOM SCORE 0 (P)           Review of Systems  Review of Systems   Constitutional: Negative  HENT: Negative  Eyes: Negative  Respiratory: Negative  Cardiovascular: Negative  Gastrointestinal: Negative  Endocrine: Negative  Genitourinary:        Per HPI   Musculoskeletal: Negative  Skin: Negative  Allergic/Immunologic: Negative  Neurological: Negative  Hematological: Negative  Psychiatric/Behavioral: Negative            Past Medical History  Past Medical History:   Diagnosis Date   • Acute kidney injury [facility])    • Arthritis     Hands   • Wright esophagus    • BPH with obstruction/lower urinary tract symptoms    • CAD (coronary artery disease)     Last assessed 09/16/15   • Cancer [facility])     bladder   • Cataract, acquired     Last assessed 10/10/17   • Chronic kidney disease    • Coronary artery disease    • Diabetes mellitus ([facility] [address] )     NIDDM   • Diabetic neuropathy ([facility] [address] )     Feet   • Enlarged prostate with lower urinary tract symptoms (LUTS)     Last assessed 10/10/17   • Erectile dysfunction    • GERD (gastroesophageal reflux disease)     Last assessed 10/10/17   • Hemoptysis     Last assessed 03/14/16   • Hypercholesterolemia     Last assessed 10/10/17   • Hypertension     Last assessed 10/10/17   • Macular degeneration     Right eye is particularly affected-peripheral vision intact   • Myocardial infarction [facility]) 1998   • OAB (overactive bladder)    • Testicular hypofunction    • Testicular hypogonadism     Last assessed 10/10/17   • Tinnitus    • Umbilical hernia     Last assessed 06/18/14   • Urge incontinence of urine    • Wears glasses        Past Social History  Past Surgical History:   Procedure Laterality Date   • COLONOSCOPY     • CORONARY ARTERY BYPASS GRAFT  07/16/2014    ABG x 4 LIMA to LAD,SVG to diagnoal 2 SVG to OM-1, SVG to PDA, resection of partial plerual mass   • CT GUIDED PERC DRAINAGE CATHETER PLACEMENT  2/19/2021   • CYSTOSCOPY  2013   • CYSTOSCOPY  02/08/2022    [facility]   • ESOPHAGOGASTRODUODENOSCOPY     • FL RETROGRADE PYELOGRAM  12/20/2018   • FL RETROGRADE PYELOGRAM  12/5/2019   • INGUINAL HERNIA REPAIR Bilateral 2015   • IR DRAINAGE TUBE CHECK AND/OR REMOVAL  3/5/2021   • PHOTODYNAMIC THERAPY      For Barretts esophagus   • LA COLONOSCOPY FLX DX W/COLLJ SPEC WHEN PFRMD N/A 4/25/2016    Procedure: COLONOSCOPY;  Surgeon: Igor Monroy MD;  Location: [facility];   Service: Gastroenterology   • LA CYSTOURETHROSCOPY,BIOPSY N/A 9/10/2020 Procedure: CYSTOSCOPY, COLLECTION OF LEFT KIDNEY CYTOLOGY, BILATERAL RETROGRADE PYELOGRAM, BLADDER WALL BIOPSIES  AND FULGERAION, RANDOM BLADDER TUMOR BIOPSIES;  Surgeon: Marybel Denise MD;  Location: [address] [place] [address] MAIN OR;  Service: Urology   • IL CYSTOURETHROSCOPY,BIOPSY N/A 4/5/2022    Procedure: urethroscopy , biopsy of urethra with fulgeration;  Surgeon: Deandre Clement MD;  Location:  MAIN OR;  Service: Urology   • IL CYSTOURETHROSCOPY,FULGUR 2-5 CM LESN N/A 4/16/2020    Procedure: CYSTOSCOPY, TRANSURETHRAL RESECTION OF BLADDER TUMOR (TURBT);   Surgeon: Marybel Denise MD;  Location[facility];  Service: Urology   • IL CYSTOURETHROSCOPY,FULGUR <0 5 CM LESN N/A 12/5/2019    Procedure: CYSTO W/TURBT AND TRANSURETHRAL PROSTATE BIOPSY AND OPENING OF BLADDER NECK CONTRACTURE, B/L Retrograde pyelogram;  Surgeon: Marybel Denise MD;  [facility];  Service: Urology   • TONSILLECTOMY     • TRANSURETHRAL RESECTION OF PROSTATE N/A 12/20/2018    Procedure: CYSTO, PHOTO SELECTIVE VAPORIZATION OF PROSTATE, B/L RETROGRADE PYELOGRAM, TURBT;  Surgeon: Marybel Denise MD;  Location[facility];  Service: Urology   • UMBILICAL HERNIA REPAIR  2012   • UPPER GASTROINTESTINAL ENDOSCOPY         Past Family History  Family History   Problem Relation Age of Onset   • Cancer Mother    • Other Mother         Digestive System Complications   • Diabetes Father    • Heart disease Father    • Hypertension Father    • Coronary artery disease Father    • Hyperlipidemia Father        Past Social history  Social History     Socioeconomic History   • Marital status: /Civil Union     Spouse name: Not on file   • Number of children: Not on file   • Years of education: Not on file   • Highest education level: Not on file   Occupational History   • Occupation: Sales position   Tobacco Use   • Smoking status: Former Smoker     Packs/day: 1 00     Years: 13 00     Pack years: 13 00     Types: Cigarettes     Quit date: 1972 Years since quittin 8   • Smokeless tobacco: Never Used   Vaping Use   • Vaping Use: Never used   Substance and Sexual Activity   • Alcohol use: Not Currently     Alcohol/week: 2 0 standard drinks     Types: 1 Glasses of wine, 1 Cans of beer per week   • Drug use: Never   • Sexual activity: Not Currently   Other Topics Concern   • Not on file   Social History Narrative    Always uses seatbelt        Caffeine use- Drinks 2 cups of coffee daily     Social Determinants of Health     Financial Resource Strain: Not on file   Food Insecurity: Not on file   Transportation Needs: Not on file   Physical Activity: Not on file   Stress: Not on file   Social Connections: Not on file   Intimate Partner Violence: Not on file   Housing Stability: Not on file       Current Medications  Current Outpatient Medications   Medication Sig Dispense Refill   • citalopram (CeleXA) 20 mg tablet TAKE ONE TABLET BY MOUTH EVERY DAY 30 tablet 3   • atorvastatin (LIPITOR) 40 mg tablet TAKE ONE TABLET BY MOUTH AT BEDTIME 90 tablet 3   • Blood Glucose Monitoring Suppl (OneTouch Verio Flex System) w/Device KIT USE TO TEST BLOOD GLUCOSE DAILY 1 kit 0   • Cholecalciferol (VITAMIN D) 50 MCG (2000 UT) tablet Take 2,000 Units by mouth daily     • Dapagliflozin Propanediol (Farxiga) 5 MG TABS Take 1 tablet (5 mg total) by mouth daily 30 tablet 2   • ferrous sulfate 324 (65 Fe) mg Take 1 tablet (324 mg total) by mouth daily 30 tablet 5   • Lancets (OneTouch Delica Plus [id number]) MISC TEST BLOOD GLUCOSE ONCE DAILY 100 each 0   • magnesium oxide (MAG-OX) 400 mg Take 1 tablet (400 mg total) by mouth in the morning   180 tablet 2   • metoprolol tartrate (LOPRESSOR) 50 mg tablet Take 1 tablet (50 mg total) by mouth 2 (two) times a day (Patient taking differently: Take 25 mg by mouth 2 (two) times a day) 180 tablet 3   • Multiple Vitamins-Minerals (OCUVITE-LUTEIN PO) Take 1 tablet by mouth 2 (two) times a day Pt is taking preservision      • nystatin (MYCOSTATIN) powder Apply topically 3 (three) times a day (Patient taking differently: Apply topically as needed) 30 g 3   • omeprazole (PriLOSEC) 40 MG capsule Take 1 capsule (40 mg total) by mouth in the morning  90 capsule 3   • ondansetron (ZOFRAN) 4 mg tablet Take 1 tablet (4 mg total) by mouth every 8 (eight) hours as needed for nausea or vomiting 20 tablet 1   • OneTouch Verio test strip TEST ONCE A  strip 0   • sodium bicarbonate 650 mg tablet TAKE 2 TABLETS BY MOUTH TWICE A  tablet 3   • traMADol (ULTRAM) 50 mg tablet TAKE ONE TABLET BY MOUTH EVERY 6 HOURS AS NEEDED FOR MODERATE PAIN 20 tablet 0   • vitamin E, tocopherol, 400 units capsule Take 400 Units by mouth daily       No current facility-administered medications for this visit  Allergies  Allergies   Allergen Reactions   • Fentanyl Hallucinations   • Morphine And Related Other (See Comments)     While having an MI patient received morphine and had adverse reaction but doesn't know what happened  Past Medical History, Social History, Family History, medications and allergies were reviewed  Vitals  There were no vitals filed for this visit  Physical Exam  Physical Exam    On examination he is in no acute distress  His abdomen is soft nontender nondistended  Ileal conduit is appreciated the right mid abdomen  The conduit is pink with some mucus and clear yellow urine within the bag  His incision is well healed  He has no calf tenderness or swelling  There is no lower extremity edema    Gait normal   Affect normal   Neurologic grossly intact nonfocal    Results  Lab Results   Component Value Date    PSA 0 4 09/19/2018    PSA 0 4 10/04/2017    PSA 0 4 10/17/2016     Lab Results   Component Value Date    GLUCOSE 128 08/13/2015    CALCIUM 9 5 10/25/2022     08/13/2015    K 5 2 10/25/2022    CO2 22 10/25/2022     (H) 10/25/2022    BUN 50 (H) 10/25/2022    CREATININE 2 24 (H) 10/25/2022     Lab Results Component Value Date    WBC 4 44 10/25/2022    HGB 10 8 (L) 10/25/2022    HCT 33 2 (L) 10/25/2022    MCV 96 10/25/2022     (H) 10/25/2022         Office Urine Dip  No results found for this or any previous visit (from the past 1 hour(s)) ]

## 2022-11-04 NOTE — LETTER
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, status post radical cystoprostatectomy with pelvic lymph node dissection ileal conduit creation, urethral recurrence, 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      Discussion  I again had a lengthy discussion with Priya Parker and his wife Rey Vasquez in the office today  At this time Dr Nichole Ray at the [facility] does not feel that he is a surgical candidate and I concur as he has iliac adenopathy  I explained to the patient and his wife that systemic disease is treated with systemic therapy  He will continue to follow with Dr Angel Echeverria to receive his platinum-based chemotherapy  His next CT scan is scheduled at the end of November 2022  He will return in follow-up in December 2022 so that we can assess the efficacy of his chemotherapy  Fortunately he does not have any blood per urethra  I explained to the patient that if he is bleeding from the urethra this would likely indicate regrowth of tumor and he may require second-look urethroscopy in the operating room with fulguration of any active bleeding lesions  Patient is amenable with this plan  History of Present Illness  80 y o  male with a 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  This prompted a radical cystoprostatectomy with ileal conduit creation that was performed in 2020 at the [address] [place] [address]  The patient returned to our practice for surveillance follow-up  I performed urethroscopy previously and found carpeting of tumor along his urethra  He subsequent went to the operating room where I performed a urethral biopsy along with fulguration  Pathology was concerning for 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  I refer the patient back to the [address] [place] [address]  His CT scan from the fall of 2022 to showed multiple suspicious enlarged lymph nodes along the iliac chain  He likely has stage IV disease  He has been receiving gemcitabine and carboplatin  Previously he had been on pembrolizumab  Just prior to his scheduled urethrectomy the patient had a CT scan which showed iliac adenopathy  This was in September of 2022  He has since started his chemotherapy and his surgery was canceled  He denies any constitutional signs  He states that he has actually gained a small amount of weight recently  He has no issues with his stoma or ileal conduit          AUA Symptom Score  AUA SYMPTOM SCORE    Flowsheet Row Most Recent Value   AUA SYMPTOM SCORE    How often have you had a sensation of not emptying your bladder completely after you finished urinating? 0 (P)     How often have you had to urinate again less than two hours after you finished urinating? 0 (P)     How often have you found you stopped and started again several times when you urinate? 0 (P)     How often have you found it difficult to postpone urination? 0 (P)     How often have you had a weak urinary stream? 0 (P)     How often have you had to push or strain to begin urination? 0 (P)     How many times did you most typically get up to urinate from the time you went to bed at night until the time you got up in the morning? 0 (P)     Quality of Life: If you were to spend the rest of your life with your urinary condition just the way it is now, how would you feel about that? 4 (P)     AUA SYMPTOM SCORE 0 (P)           Review of Systems  Review of Systems   Constitutional: Negative  HENT: Negative  Eyes: Negative  Respiratory: Negative  Cardiovascular: Negative  Gastrointestinal: Negative  Endocrine: Negative  Genitourinary:        Per HPI   Musculoskeletal: Negative  Skin: Negative  Allergic/Immunologic: Negative  Neurological: Negative  Hematological: Negative  Psychiatric/Behavioral: Negative            Past Medical History  Past Medical History:   Diagnosis Date   • Acute kidney injury ([facility] )    • Arthritis     Hands   • Wright esophagus    • BPH with obstruction/lower urinary tract symptoms    • CAD (coronary artery disease)     Last assessed 09/16/15   • Cancer ([place] [id number] )     bladder   • Cataract, acquired     Last assessed 10/10/17   • Chronic kidney disease    • Coronary artery disease    • Diabetes mellitus ([place] [address] )     NIDDM   • Diabetic neuropathy ([facility])     Feet   • Enlarged prostate with lower urinary tract symptoms (LUTS)     Last assessed 10/10/17   • Erectile dysfunction    • GERD (gastroesophageal reflux disease)     Last assessed 10/10/17   • Hemoptysis     Last assessed 03/14/16   • Hypercholesterolemia     Last assessed 10/10/17   • Hypertension     Last assessed 10/10/17   • Macular degeneration     Right eye is particularly affected-peripheral vision intact   • Myocardial infarction [facility]) 1998   • OAB (overactive bladder)    • Testicular hypofunction    • Testicular hypogonadism     Last assessed 10/10/17   • Tinnitus    • Umbilical hernia     Last assessed 06/18/14   • Urge incontinence of urine    • Wears glasses        Past Social History  Past Surgical History:   Procedure Laterality Date   • COLONOSCOPY     • CORONARY ARTERY BYPASS GRAFT  07/16/2014    ABG x 4 LIMA to LAD,SVG to diagnoal 2 SVG to OM-1, SVG to PDA, resection of partial plerual mass   • CT GUIDED PERC DRAINAGE CATHETER PLACEMENT  2/19/2021   • CYSTOSCOPY  2013   • CYSTOSCOPY  02/08/2022    [facility]   • ESOPHAGOGASTRODUODENOSCOPY     • FL RETROGRADE PYELOGRAM  12/20/2018   • FL RETROGRADE PYELOGRAM  12/5/2019   • INGUINAL HERNIA REPAIR Bilateral 2015   • IR DRAINAGE TUBE CHECK AND/OR REMOVAL  3/5/2021   • PHOTODYNAMIC THERAPY      For Barretts esophagus   • AK COLONOSCOPY FLX DX W/COLLJ SPEC WHEN PFRMD N/A 4/25/2016    Procedure: COLONOSCOPY;  Surgeon: Tomas Marques MD;  Location:  GI LAB; Service: Gastroenterology   • AK CYSTOURETHROSCOPY,BIOPSY N/A 9/10/2020    Procedure: CYSTOSCOPY, COLLECTION OF LEFT KIDNEY CYTOLOGY, BILATERAL RETROGRADE PYELOGRAM, BLADDER WALL BIOPSIES  AND [address], RANDOM BLADDER TUMOR BIOPSIES;  Surgeon: Dago Hooker MD;  Location: [address] [place] [address] MAIN OR;  Service: Urology   • AK CYSTOURETHROSCOPY,BIOPSY N/A 4/5/2022    Procedure: urethroscopy , biopsy of urethra with fulgeration;  Surgeon: Krupa Noble MD;  Location:  MAIN OR;  Service: Urology   • AK CYSTOURETHROSCOPY,FULGUR 2-5 CM LESN N/A 4/16/2020    Procedure: CYSTOSCOPY, TRANSURETHRAL RESECTION OF BLADDER TUMOR (TURBT);   Surgeon: Dago Hooker MD;  [facility];  Service: Urology   • AK CYSTOURETHROSCOPY,FULGUR <0 5 CM LESN N/A 12/5/2019    Procedure: CYSTO W/TURBT AND TRANSURETHRAL PROSTATE BIOPSY AND OPENING OF BLADDER NECK CONTRACTURE, B/L Retrograde pyelogram;  Surgeon: Dago Hooker MD;  [facility];  Service: Urology   • TONSILLECTOMY     • TRANSURETHRAL RESECTION OF PROSTATE N/A 12/20/2018    Procedure: CYSTO, PHOTO SELECTIVE VAPORIZATION OF PROSTATE, B/L RETROGRADE PYELOGRAM, TURBT;  Surgeon: Dago Hooker MD;  [facility];  Service: Urology   • UMBILICAL HERNIA REPAIR  2012   • UPPER GASTROINTESTINAL ENDOSCOPY         Past Family History  Family History   Problem Relation Age of Onset   • Cancer Mother    • Other Mother         Digestive System Complications   • Diabetes Father    • Heart disease Father    • Hypertension Father    • Coronary artery disease Father    • Hyperlipidemia Father        Past Social history  Social History     Socioeconomic History   • Marital status: /Civil Union     Spouse name: Not on file   • Number of children: Not on file   • Years of education: Not on file   • Highest education level: Not on file   Occupational History   • Occupation: Sales position   Tobacco Use   • Smoking status: Former Smoker     Packs/day: 1 00     Years: 13 00     Pack years:  00     Types: Cigarettes     Quit date:      Years since quittin 8   • Smokeless tobacco: Never Used   Vaping Use   • Vaping Use: Never used   Substance and Sexual Activity   • Alcohol use: Not Currently     Alcohol/week: 2 0 standard drinks     Types: 1 Glasses of wine, 1 Cans of beer per week   • Drug use: Never   • Sexual activity: Not Currently   Other Topics Concern   • Not on file   Social History Narrative    Always uses seatbelt        Caffeine use- Drinks 2 cups of coffee daily     Social Determinants of Health     Financial Resource Strain: Not on file   Food Insecurity: Not on file   Transportation Needs: Not on file   Physical Activity: Not on file   Stress: Not on file   Social Connections: Not on file   Intimate Partner Violence: Not on file   Housing Stability: Not on file       Current Medications  Current Outpatient Medications   Medication Sig Dispense Refill   • citalopram (CeleXA) 20 mg tablet TAKE ONE TABLET BY MOUTH EVERY DAY 30 tablet 3   • atorvastatin (LIPITOR) 40 mg tablet TAKE ONE TABLET BY MOUTH AT BEDTIME 90 tablet 3   • Blood Glucose Monitoring Suppl (OneTouch Verio Flex System) w/Device KIT USE TO TEST BLOOD GLUCOSE DAILY 1 kit 0   • Cholecalciferol (VITAMIN D) 50 MCG (2000 UT) tablet Take 2,000 Units by mouth daily     • Dapagliflozin Propanediol (Farxiga) 5 MG TABS Take 1 tablet (5 mg total) by mouth daily 30 tablet 2   • ferrous sulfate 324 (65 Fe) mg Take 1 tablet (324 mg total) by mouth daily 30 tablet 5   • Lancets (OneTouch Delica Plus [id number]) MISC TEST BLOOD GLUCOSE ONCE DAILY 100 each 0   • magnesium oxide (MAG-OX) 400 mg Take 1 tablet (400 mg total) by mouth in the morning  180 tablet 2   • metoprolol tartrate (LOPRESSOR) 50 mg tablet Take 1 tablet (50 mg total) by mouth 2 (two) times a day (Patient taking differently: Take 25 mg by mouth 2 (two) times a day) 180 tablet 3   • Multiple Vitamins-Minerals (OCUVITE-LUTEIN PO) Take 1 tablet by mouth 2 (two) times a day Pt is taking preservision      • nystatin (MYCOSTATIN) powder Apply topically 3 (three) times a day (Patient taking differently: Apply topically as needed) 30 g 3   • omeprazole (PriLOSEC) 40 MG capsule Take 1 capsule (40 mg total) by mouth in the morning  90 capsule 3   • ondansetron (ZOFRAN) 4 mg tablet Take 1 tablet (4 mg total) by mouth every 8 (eight) hours as needed for nausea or vomiting 20 tablet 1   • OneTouch Verio test strip TEST ONCE A  strip 0   • sodium bicarbonate 650 mg tablet TAKE 2 TABLETS BY MOUTH TWICE A  tablet 3   • traMADol (ULTRAM) 50 mg tablet TAKE ONE TABLET BY MOUTH EVERY 6 HOURS AS NEEDED FOR MODERATE PAIN 20 tablet 0   • vitamin E, tocopherol, 400 units capsule Take 400 Units by mouth daily       No current facility-administered medications for this visit  Allergies  Allergies   Allergen Reactions   • Fentanyl Hallucinations   • Morphine And Related Other (See Comments)     While having an MI patient received morphine and had adverse reaction but doesn't know what happened  Past Medical History, Social History, Family History, medications and allergies were reviewed  Vitals  There were no vitals filed for this visit  Physical Exam  Physical Exam    On examination he is in no acute distress  His abdomen is soft nontender nondistended  Ileal conduit is appreciated the right mid abdomen    The conduit is pink with some mucus and clear yellow urine within the bag  His incision is well healed  He has no calf tenderness or swelling  There is no lower extremity edema    Gait normal   Affect normal   Neurologic grossly intact nonfocal    Results  Lab Results   Component Value Date    PSA 0 4 09/19/2018    PSA 0 4 10/04/2017    PSA 0 4 10/17/2016     Lab Results   Component Value Date    GLUCOSE 128 08/13/2015    CALCIUM 9 5 10/25/2022     08/13/2015    K 5 2 10/25/2022    CO2 22 10/25/2022     (H) 10/25/2022    BUN 50 (H) 10/25/2022    CREATININE 2 24 (H) 10/25/2022     Lab Results   Component Value Date    WBC 4 44 10/25/2022    HGB 10 8 (L) 10/25/2022    HCT 33 2 (L) 10/25/2022    MCV 96 10/25/2022     (H) 10/25/2022         Office Urine Dip  No results found for this or any previous visit (from the past 1 hour(s)) ]

## 2022-11-08 ENCOUNTER — APPOINTMENT (OUTPATIENT)
Dept: LAB | Facility: CLINIC | Age: 82
End: 2022-11-08

## 2022-11-08 DIAGNOSIS — C67.8 MALIGNANT NEOPLASM OF OVERLAPPING SITES OF BLADDER (HCC): ICD-10-CM

## 2022-11-08 LAB
ALBUMIN SERPL BCP-MCNC: 3.1 G/DL (ref 3.5–5)
ALP SERPL-CCNC: 97 U/L (ref 46–116)
ALT SERPL W P-5'-P-CCNC: 62 U/L (ref 12–78)
ANION GAP SERPL CALCULATED.3IONS-SCNC: 8 MMOL/L (ref 4–13)
AST SERPL W P-5'-P-CCNC: 30 U/L (ref 5–45)
BASOPHILS # BLD AUTO: 0.01 THOUSANDS/ÂΜL (ref 0–0.1)
BASOPHILS NFR BLD AUTO: 0 % (ref 0–1)
BILIRUB SERPL-MCNC: 0.48 MG/DL (ref 0.2–1)
BUN SERPL-MCNC: 40 MG/DL (ref 5–25)
CALCIUM ALBUM COR SERPL-MCNC: 9.5 MG/DL (ref 8.3–10.1)
CALCIUM SERPL-MCNC: 8.8 MG/DL (ref 8.3–10.1)
CHLORIDE SERPL-SCNC: 110 MMOL/L (ref 96–108)
CO2 SERPL-SCNC: 21 MMOL/L (ref 21–32)
CREAT SERPL-MCNC: 2.45 MG/DL (ref 0.6–1.3)
EOSINOPHIL # BLD AUTO: 0.04 THOUSAND/ÂΜL (ref 0–0.61)
EOSINOPHIL NFR BLD AUTO: 1 % (ref 0–6)
ERYTHROCYTE [DISTWIDTH] IN BLOOD BY AUTOMATED COUNT: 18.8 % (ref 11.6–15.1)
GFR SERPL CREATININE-BSD FRML MDRD: 23 ML/MIN/1.73SQ M
GLUCOSE SERPL-MCNC: 162 MG/DL (ref 65–140)
HCT VFR BLD AUTO: 28.7 % (ref 36.5–49.3)
HGB BLD-MCNC: 9.3 G/DL (ref 12–17)
IMM GRANULOCYTES # BLD AUTO: 0.14 THOUSAND/UL (ref 0–0.2)
IMM GRANULOCYTES NFR BLD AUTO: 2 % (ref 0–2)
LYMPHOCYTES # BLD AUTO: 0.68 THOUSANDS/ÂΜL (ref 0.6–4.47)
LYMPHOCYTES NFR BLD AUTO: 10 % (ref 14–44)
MCH RBC QN AUTO: 31.4 PG (ref 26.8–34.3)
MCHC RBC AUTO-ENTMCNC: 32.4 G/DL (ref 31.4–37.4)
MCV RBC AUTO: 97 FL (ref 82–98)
MONOCYTES # BLD AUTO: 1.27 THOUSAND/ÂΜL (ref 0.17–1.22)
MONOCYTES NFR BLD AUTO: 19 % (ref 4–12)
NEUTROPHILS # BLD AUTO: 4.56 THOUSANDS/ÂΜL (ref 1.85–7.62)
NEUTS SEG NFR BLD AUTO: 68 % (ref 43–75)
NRBC BLD AUTO-RTO: 0 /100 WBCS
PLATELET # BLD AUTO: 95 THOUSANDS/UL (ref 149–390)
PMV BLD AUTO: 10.8 FL (ref 8.9–12.7)
POTASSIUM SERPL-SCNC: 4.8 MMOL/L (ref 3.5–5.3)
PROT SERPL-MCNC: 6.9 G/DL (ref 6.4–8.4)
RBC # BLD AUTO: 2.96 MILLION/UL (ref 3.88–5.62)
SODIUM SERPL-SCNC: 139 MMOL/L (ref 135–147)
WBC # BLD AUTO: 6.7 THOUSAND/UL (ref 4.31–10.16)

## 2022-11-09 RX ORDER — SODIUM CHLORIDE 9 MG/ML
20 INJECTION, SOLUTION INTRAVENOUS ONCE
Status: CANCELLED | OUTPATIENT
Start: 2022-11-17

## 2022-11-09 RX ORDER — SODIUM CHLORIDE 9 MG/ML
20 INJECTION, SOLUTION INTRAVENOUS ONCE
Status: CANCELLED | OUTPATIENT
Start: 2022-11-10

## 2022-11-10 ENCOUNTER — HOSPITAL ENCOUNTER (OUTPATIENT)
Dept: INFUSION CENTER | Facility: HOSPITAL | Age: 82
Discharge: HOME | End: 2022-11-10
Attending: INTERNAL MEDICINE

## 2022-11-10 VITALS
HEIGHT: 71 IN | TEMPERATURE: 97.7 F | HEART RATE: 59 BPM | BODY MASS INDEX: 26.91 KG/M2 | SYSTOLIC BLOOD PRESSURE: 164 MMHG | DIASTOLIC BLOOD PRESSURE: 68 MMHG | WEIGHT: 192.24 LBS | RESPIRATION RATE: 18 BRPM

## 2022-11-10 DIAGNOSIS — C67.8 MALIGNANT NEOPLASM OF OVERLAPPING SITES OF BLADDER (HCC): Primary | ICD-10-CM

## 2022-11-10 RX ORDER — SODIUM CHLORIDE 9 MG/ML
20 INJECTION, SOLUTION INTRAVENOUS ONCE
Status: COMPLETED | OUTPATIENT
Start: 2022-11-10 | End: 2022-11-10

## 2022-11-10 RX ADMIN — GEMCITABINE 2000 MG: 38 INJECTION, SOLUTION INTRAVENOUS at 11:20

## 2022-11-10 RX ADMIN — SODIUM CHLORIDE 20 ML/HR: 0.9 INJECTION, SOLUTION INTRAVENOUS at 10:17

## 2022-11-10 RX ADMIN — DEXAMETHASONE SODIUM PHOSPHATE: 10 INJECTION, SOLUTION INTRAMUSCULAR; INTRAVENOUS at 10:17

## 2022-11-10 RX ADMIN — CARBOPLATIN 197.6 MG: 10 INJECTION, SOLUTION INTRAVENOUS at 11:54

## 2022-11-10 RX ADMIN — FOSAPREPITANT 150 MG: 150 INJECTION, POWDER, LYOPHILIZED, FOR SOLUTION INTRAVENOUS at 10:41

## 2022-11-15 ENCOUNTER — APPOINTMENT (OUTPATIENT)
Dept: LAB | Facility: CLINIC | Age: 82
End: 2022-11-15

## 2022-11-15 DIAGNOSIS — C67.8 MALIGNANT NEOPLASM OF OVERLAPPING SITES OF BLADDER (HCC): ICD-10-CM

## 2022-11-15 LAB
BASOPHILS # BLD AUTO: 0.03 THOUSANDS/ÂΜL (ref 0–0.1)
BASOPHILS NFR BLD AUTO: 1 % (ref 0–1)
EOSINOPHIL # BLD AUTO: 0.15 THOUSAND/ÂΜL (ref 0–0.61)
EOSINOPHIL NFR BLD AUTO: 2 % (ref 0–6)
ERYTHROCYTE [DISTWIDTH] IN BLOOD BY AUTOMATED COUNT: 18.6 % (ref 11.6–15.1)
HCT VFR BLD AUTO: 28.8 % (ref 36.5–49.3)
HGB BLD-MCNC: 9.3 G/DL (ref 12–17)
IMM GRANULOCYTES # BLD AUTO: 0.04 THOUSAND/UL (ref 0–0.2)
IMM GRANULOCYTES NFR BLD AUTO: 1 % (ref 0–2)
LYMPHOCYTES # BLD AUTO: 0.61 THOUSANDS/ÂΜL (ref 0.6–4.47)
LYMPHOCYTES NFR BLD AUTO: 10 % (ref 14–44)
MCH RBC QN AUTO: 31.3 PG (ref 26.8–34.3)
MCHC RBC AUTO-ENTMCNC: 32.3 G/DL (ref 31.4–37.4)
MCV RBC AUTO: 97 FL (ref 82–98)
MONOCYTES # BLD AUTO: 0.26 THOUSAND/ÂΜL (ref 0.17–1.22)
MONOCYTES NFR BLD AUTO: 4 % (ref 4–12)
NEUTROPHILS # BLD AUTO: 5.14 THOUSANDS/ÂΜL (ref 1.85–7.62)
NEUTS SEG NFR BLD AUTO: 82 % (ref 43–75)
NRBC BLD AUTO-RTO: 0 /100 WBCS
PLATELET # BLD AUTO: 258 THOUSANDS/UL (ref 149–390)
PMV BLD AUTO: 11.2 FL (ref 8.9–12.7)
RBC # BLD AUTO: 2.97 MILLION/UL (ref 3.88–5.62)
WBC # BLD AUTO: 6.23 THOUSAND/UL (ref 4.31–10.16)

## 2022-11-17 ENCOUNTER — HOSPITAL ENCOUNTER (OUTPATIENT)
Dept: INFUSION CENTER | Facility: HOSPITAL | Age: 82
Discharge: HOME | End: 2022-11-17
Attending: INTERNAL MEDICINE

## 2022-11-17 VITALS
HEIGHT: 71 IN | DIASTOLIC BLOOD PRESSURE: 66 MMHG | RESPIRATION RATE: 18 BRPM | BODY MASS INDEX: 27.04 KG/M2 | TEMPERATURE: 97.9 F | SYSTOLIC BLOOD PRESSURE: 159 MMHG | HEART RATE: 58 BPM | WEIGHT: 193.12 LBS

## 2022-11-17 DIAGNOSIS — C67.8 MALIGNANT NEOPLASM OF OVERLAPPING SITES OF BLADDER (HCC): Primary | ICD-10-CM

## 2022-11-17 RX ORDER — SODIUM CHLORIDE 9 MG/ML
20 INJECTION, SOLUTION INTRAVENOUS ONCE
Status: COMPLETED | OUTPATIENT
Start: 2022-11-17 | End: 2022-11-17

## 2022-11-17 RX ADMIN — GEMCITABINE 2000 MG: 38 INJECTION, SOLUTION INTRAVENOUS at 11:35

## 2022-11-17 RX ADMIN — ONDANSETRON 8 MG: 2 INJECTION INTRAMUSCULAR; INTRAVENOUS at 11:03

## 2022-11-17 RX ADMIN — SODIUM CHLORIDE 20 ML/HR: 0.9 INJECTION, SOLUTION INTRAVENOUS at 11:03

## 2022-11-21 NOTE — PROGRESS NOTES
NEPHROLOGY OUTPATIENT PROGRESS NOTE   Angelita Mercado 80 y o  male MRN: [id number]  DATE: 11/22/2022  Reason for visit:   Chief Complaint   Patient presents with   • Follow-up   • Chronic Kidney Disease       ASSESSMENT and PLAN:  Chronic kidney disease stage 4  -baseline creatinine likely 2 0-2 4 renal function has been stable, last creatinine was 2 45 mg/dL on blood work from 11/8  Continue to monitor, stressed on oral hydration  Continue current dose of Farxiga 10 mg daily which as per recent studies has shown to slow CKD decline  Potassium level at normal range, continue low-potassium diet  -patient had loopogram in February 2021 which was found to be normal  -chronic kidney disease likely due to diabetes mellitus type 2 causing diabetic nephropathy, hypertensive nephrosclerosis, age-related nephron loss, episode of acute kidney injury/ATN  -last A1c was 6 5 which is at goal  -avoid nephrotoxins-IV contrast and NSAIDs  -avoid hypotension  -has been to [facility] in [place] 3, 2022  Preferred modality incenter HD when needed  -follow up in 3 months with labs  BMP in one month   -discussed about risk of worsening renal functio with chemotherapy      Persistent proteinuria  -last upc ratio improving to microalbumin creatinine ratio of 171 mg per g  Previously had upc ratio 0 57 in July 2022 which was actually improving from prior upc ratio of 1 2 g in May 2022   -likely due to hypertensive nephrosclerosis and diabetic nephropathy  -previously was on irbesartan which was stopped during episode of acute kidney injury in February 2021  -avoiding ARB due to worsening renal function and hyperkalemia but may consider in future if renal function remains stable and if potassium level remains stable    Overall proteinuria currently improving with use of Farxiga     Metabolic acidosis  - bicarbonate level  stable at 21  - Continue current dose of oral sodium bicarbonate- two tab bid   -Use of sodium bicarbonate level has shown to decrease the rate of renal function decline in CKD population     Hypomagnesemia on oral magnesium oxide 400 mg daily   Last magnesium level was at normal range     Primary Hypertension with chronic kidney disease stage 4:   -Current medication:  Metoprolol  25 mg bid   -Current blood pressure:  stable   -Plan:    ? Continue current dose of metoprolol   ? Patient previously on irbesartan prior to acute kidney injury in February 2021 but currently blood pressure well controlled, no need to add another medication  -Recommend 2 g sodium diet     -Recommend daily exercise and weight loss  -Discussed home monitoring of BP and maintaining a log of blood pressure   -Recommend goal BP less than 130/80       Secondary hyperparathyroidism of renal origin:  PTH level trending up to 108 7 in May 2022, vitamin-D 31 1, would continue to monitor    Recheck before the next office visit      Anemia , unspecified   -due to stage 4 chronic kidney disease vs chemotherpay   -hb 9 3 g/dl   -check hb and iron panel in one month    -continue to monitor  -recommend discussion with hematology - especially as c/o fatigue     Hyperkalemia  -recommend low K diet and avoiding high k diet, last potassium level at normal range     Bladder cancer   -status post BCG and status post Keytruda   Status post radical cysto prostatectomy with 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   -status post drainage of anterior wall abscess  -s/p urethral biopsy -High grade, non-invasive papillary urothelial carcinoma   -now with recurrent disease in right iliac pelvic area- on chemo with carboplatin/ gemcitabine since sept 2022   -continue follow-up with Urology - reviewed urology note        History of right pelvic abscess: status post IR guided drainage and drain placement-removed on 03/05/2021  -antibiotics course completed        Patient Instructions   -Renal Function is stable   -You have Chronic Kidney Disease Stage 4   -Avoid NSAIDs like Ibuprofen/Motrin, Naproxen/Aleve, Celebrex, meloxicam/Mobic, Diclofenac and other NSAIDs   -Okay to take Acetaminophen/Tylenol if you do not have any liver problems  -Avoid IV contrast used for CT scan and cardiac catheterization     -If plan for any study with IV contrast, please let me know so we could hydrate with fluids before and after IV contrast  -Dosage  of certain medications may need to be adjusted depending on Kidney function  BP stable   Labs in one months  Follow up in 3 months with labs prior to visit - With Kayla Rubio     Diagnoses and all orders for this visit:    Stage 4 chronic kidney disease ([facility] )  -     Basic metabolic panel; Future  -     CBC; Future  -     Iron Panel (Includes Ferritin, Iron Sat%, Iron, and TIBC); Future  -     Basic metabolic panel; Future  -     Phosphorus; Future  -     Magnesium; Future  -     CBC; Future  -     Protein / creatinine ratio, urine; Future  -     PTH, intact; Future  -     Urinalysis with microscopic; Future    Persistent proteinuria  -     Protein / creatinine ratio, urine; Future    Metabolic acidosis  -     Basic metabolic panel; Future    Hypomagnesemia  -     Magnesium; Future    Benign hypertension with chronic kidney disease, stage IV (HCC)  -     Basic metabolic panel; Future    Secondary hyperparathyroidism of renal origin ([facility] )  -     PTH, intact; Future  -     Vitamin D 25 hydroxy; Future    Anemia, unspecified type  -     CBC; Future  -     Iron Panel (Includes Ferritin, Iron Sat%, Iron, and TIBC); Future  -     CBC;  Future            SUBJECTIVE / HPI:  Zachary Velez is a 80 y o   male with past history of CAD status post CABG, bladder cancer status post ileal conduit in October 2020, diabetes mellitus type 2, hypertension presenting for follow-up of chronic kidney disease    - Patient was 1st seen by Nephrology during hospital admission and had acute kidney injury at that time   Was thought to be prerenal, admission creatinine was 3 5 and improved to 1 9 in February 2021  Dyana Cough last office visit with advanced practitioner creatinine was around 2 0     - renal function recently since February 2021 to July 2021 was reviewed, creatinine has been around 1 9-2 2  Recently has been around 2 0-2 4 as per last office visit note from advanced practitioner Juan Manuel Parkinson from August 2022  Was told to continue sodium bicarbonate tablet     Reviewed blood work from 11/08/2022   -creatinine 2 45 mg/dL, slightly trended up from previous level of 2 0-2 2 but has been around 2 4 in May and July 2022  Most likely baseline creatinine around 2 0-2 4  -EGFR 23  Hemoglobin around 9 3 and stable    C/o feeling tired and SOB on exertion     REVIEW OF SYSTEMS:    Review of Systems   Constitutional: Negative for chills and fever  HENT: Negative for ear pain and sore throat  Eyes: Negative for pain and visual disturbance  Respiratory: Negative for cough and shortness of breath (exertion )  Cardiovascular: Negative for chest pain and palpitations  Gastrointestinal: Negative for abdominal pain and vomiting  Genitourinary: Negative for dysuria and hematuria  Musculoskeletal: Negative for arthralgias and back pain  Skin: Negative for color change and rash  Neurological: Negative for seizures and syncope  All other systems reviewed and are negative  More than 10 point review of systems were obtained and discussed in length with the patient  Complete review of systems were negative / unremarkable except mentioned above         PAST MEDICAL HISTORY:  Past Medical History:   Diagnosis Date   • Acute kidney injury [facility])    • Arthritis     Hands   • Wright esophagus    • BPH with obstruction/lower urinary tract symptoms    • CAD (coronary artery disease)     Last assessed 09/16/15   • Cancer [facility])     bladder   • Cataract, acquired     Last assessed 10/10/17   • Chronic kidney disease    • Coronary artery disease    • Diabetes mellitus ([facility] )     NIDDM   • Diabetic neuropathy (HCC)     Feet   • Enlarged prostate with lower urinary tract symptoms (LUTS)     Last assessed 10/10/17   • Erectile dysfunction    • GERD (gastroesophageal reflux disease)     Last assessed 10/10/17   • Hemoptysis     Last assessed 03/14/16   • Hypercholesterolemia     Last assessed 10/10/17   • Hypertension     Last assessed 10/10/17   • Macular degeneration     Right eye is particularly affected-peripheral vision intact   • Myocardial infarction [facility]) 1998   • OAB (overactive bladder)    • Testicular hypofunction    • Testicular hypogonadism     Last assessed 10/10/17   • Tinnitus    • Umbilical hernia     Last assessed 06/18/14   • Urge incontinence of urine    • Wears glasses        PAST SURGICAL HISTORY:  Past Surgical History:   Procedure Laterality Date   • COLONOSCOPY     • CORONARY ARTERY BYPASS GRAFT  07/16/2014    ABG x 4 LIMA to LAD,SVG to diagnoal 2 SVG to OM-1, SVG to PDA, resection of partial plerual mass   • CT GUIDED PERC DRAINAGE CATHETER PLACEMENT  2/19/2021   • CYSTOSCOPY  2013   • CYSTOSCOPY  02/08/2022    [facility]   • ESOPHAGOGASTRODUODENOSCOPY     • FL RETROGRADE PYELOGRAM  12/20/2018   • FL RETROGRADE PYELOGRAM  12/5/2019   • INGUINAL HERNIA REPAIR Bilateral 2015   • IR DRAINAGE TUBE CHECK AND/OR REMOVAL  3/5/2021   • PHOTODYNAMIC THERAPY      For Barretts esophagus   • LA COLONOSCOPY FLX DX W/COLLJ SPEC WHEN PFRMD N/A 4/25/2016    Procedure: COLONOSCOPY;  Surgeon: Catie Garza MD;  Location:  GI LAB;   Service: Gastroenterology   • LA CYSTOURETHROSCOPY,BIOPSY N/A 9/10/2020    Procedure: CYSTOSCOPY, COLLECTION OF LEFT KIDNEY CYTOLOGY, BILATERAL RETROGRADE PYELOGRAM, BLADDER WALL BIOPSIES  AND [address], RANDOM BLADDER TUMOR BIOPSIES;  Surgeon: Luis Galicia MD;  Location: [facility] MAIN OR;  Service: Urology   • LA CYSTOURETHROSCOPY,BIOPSY N/A 4/5/2022    Procedure: urethroscopy , biopsy of urethra with fulgeration;  Surgeon: Jennifer Pozo MD;  Location: [facility] OR;  Service: Urology   • ME CYSTOURETHROSCOPY,FULGUR 2-5 CM LESN N/A 2020    Procedure: CYSTOSCOPY, TRANSURETHRAL RESECTION OF BLADDER TUMOR (TURBT);   Surgeon: Nu Lemos MD;  Location: [facility];  Service: Urology   • ME CYSTOURETHROSCOPY,FULGUR <0 5 CM LESN N/A 2019    Procedure: CYSTO W/TURBT AND TRANSURETHRAL PROSTATE BIOPSY AND OPENING OF BLADDER NECK CONTRACTURE, B/L Retrograde pyelogram;  Surgeon: Nu Lemos MD;  Location: [facility];  Service: Urology   • TONSILLECTOMY     • TRANSURETHRAL RESECTION OF PROSTATE N/A 2018    Procedure: CYSTO, PHOTO SELECTIVE VAPORIZATION OF PROSTATE, B/L RETROGRADE PYELOGRAM, TURBT;  Surgeon: Nu Lemos MD;  Location: [facility];  Service: Urology   • UMBILICAL HERNIA REPAIR     • UPPER GASTROINTESTINAL ENDOSCOPY         SOCIAL HISTORY:  Social History     Substance and Sexual Activity   Alcohol Use Not Currently   • Alcohol/week: 2 0 standard drinks   • Types: 1 Glasses of wine, 1 Cans of beer per week     Social History     Substance and Sexual Activity   Drug Use Never     Social History     Tobacco Use   Smoking Status Former   • Packs/day: 1 00   • Years: 13 00   • Pack years: 13    • Types: Cigarettes   • Quit date: 0   • Years since quittin 9   Smokeless Tobacco Never       FAMILY HISTORY:  Family History   Problem Relation Age of Onset   • Cancer Mother    • Other Mother         Digestive System Complications   • Diabetes Father    • Heart disease Father    • Hypertension Father    • Coronary artery disease Father    • Hyperlipidemia Father        MEDICATIONS:    Current Outpatient Medications:   •  atorvastatin (LIPITOR) 40 mg tablet, TAKE ONE TABLET BY MOUTH AT BEDTIME, Disp: 90 tablet, Rfl: 3  •  Blood Glucose Monitoring Suppl (OneTouch Verio Flex System) w/Device KIT, USE TO TEST BLOOD GLUCOSE DAILY, Disp: 1 kit, Rfl: 0  •  Cholecalciferol (VITAMIN D) 50 MCG ( UT) tablet, Take 2,000 Units by mouth daily, Disp: , Rfl:   •  citalopram (CeleXA) 20 mg tablet, TAKE ONE TABLET BY MOUTH EVERY DAY, Disp: 30 tablet, Rfl: 3  •  Dapagliflozin Propanediol (Farxiga) 5 MG TABS, Take 1 tablet (5 mg total) by mouth daily, Disp: 30 tablet, Rfl: 2  •  ferrous sulfate 324 (65 Fe) mg, Take 1 tablet (324 mg total) by mouth daily, Disp: 30 tablet, Rfl: 5  •  Lancets (OneTouch Delica Plus [id number]) MISC, TEST BLOOD GLUCOSE ONCE DAILY, Disp: 100 each, Rfl: 0  •  magnesium oxide (MAG-OX) 400 mg, Take 1 tablet (400 mg total) by mouth in the morning , Disp: 180 tablet, Rfl: 2  •  metoprolol tartrate (LOPRESSOR) 50 mg tablet, Take 1 tablet (50 mg total) by mouth 2 (two) times a day (Patient taking differently: Take 25 mg by mouth 2 (two) times a day), Disp: 180 tablet, Rfl: 3  •  Multiple Vitamins-Minerals (OCUVITE-LUTEIN PO), Take 1 tablet by mouth 2 (two) times a day Pt is taking preservision , Disp: , Rfl:   •  nystatin (MYCOSTATIN) powder, Apply topically 3 (three) times a day (Patient taking differently: Apply topically as needed), Disp: 30 g, Rfl: 3  •  omeprazole (PriLOSEC) 40 MG capsule, Take 1 capsule (40 mg total) by mouth in the morning , Disp: 90 capsule, Rfl: 3  •  ondansetron (ZOFRAN) 4 mg tablet, Take 1 tablet (4 mg total) by mouth every 8 (eight) hours as needed for nausea or vomiting, Disp: 20 tablet, Rfl: 1  •  OneTouch Verio test strip, TEST ONCE A DAY, Disp: 100 strip, Rfl: 0  •  sodium bicarbonate 650 mg tablet, TAKE 2 TABLETS BY MOUTH TWICE A DAY, Disp: 360 tablet, Rfl: 3  •  traMADol (ULTRAM) 50 mg tablet, TAKE ONE TABLET BY MOUTH EVERY 6 HOURS AS NEEDED FOR MODERATE PAIN, Disp: 20 tablet, Rfl: 0  •  vitamin E, tocopherol, 400 units capsule, Take 400 Units by mouth daily, Disp: , Rfl:       PHYSICAL EXAM:  Vitals:    11/22/22 1254   BP: 132/62   BP Location: Left arm   Patient Position: Sitting   Cuff Size: Large   Pulse: 60   Weight: 91 2 kg (201 lb)   Height: 5' 10 98" (1 803 m)     Body mass index is 28 05 kg/m²      Physical Exam  Constitutional:       General: He is not in acute distress  Appearance: He is well-developed  He is not diaphoretic  HENT:      Head: Normocephalic and atraumatic  Mouth/Throat:      Mouth: Mucous membranes are moist    Eyes:      General: No scleral icterus  Conjunctiva/sclera: Conjunctivae normal       Pupils: Pupils are equal, round, and reactive to light  Neck:      Thyroid: No thyromegaly  Cardiovascular:      Rate and Rhythm: Normal rate and regular rhythm  Heart sounds: Normal heart sounds  No murmur heard  No friction rub  Pulmonary:      Effort: Pulmonary effort is normal  No respiratory distress  Breath sounds: Normal breath sounds  No wheezing or rales  Abdominal:      General: Bowel sounds are normal  There is no distension  Palpations: Abdomen is soft  Tenderness: There is no abdominal tenderness  Musculoskeletal:         General: No deformity  Cervical back: Neck supple  Right lower leg: No edema  Left lower leg: No edema  Lymphadenopathy:      Cervical: No cervical adenopathy  Skin:     Coloration: Skin is not pale  Nails: There is no clubbing  Neurological:      Mental Status: He is alert and oriented to person, place, and time  He is not disoriented  Psychiatric:         Mood and Affect: Mood normal  Mood is not anxious  Affect is not inappropriate  Behavior: Behavior normal          Thought Content:  Thought content normal          Lab Results:   Results for orders placed or performed in visit on 11/15/22   CBC and differential   Result Value Ref Range    WBC 6 23 4 31 - 10 16 Thousand/uL    RBC 2 97 (L) 3 88 - 5 62 Million/uL    Hemoglobin 9 3 (L) 12 0 - 17 0 g/dL    Hematocrit 28 8 (L) 36 5 - 49 3 %    MCV 97 82 - 98 fL    MCH 31 3 26 8 - 34 3 pg    MCHC 32 3 31 4 - 37 4 g/dL    RDW 18 6 (H) 11 6 - 15 1 %    MPV 11 2 8 9 - 12 7 fL    Platelets 670 341 - 060 Thousands/uL    nRBC 0 /100 WBCs    Neutrophils Relative 82 (H) 43 - 75 %    Immat GRANS % 1 0 - 2 %    Lymphocytes Relative 10 (L) 14 - 44 %    Monocytes Relative 4 4 - 12 %    Eosinophils Relative 2 0 - 6 %    Basophils Relative 1 0 - 1 %    Neutrophils Absolute 5 14 1 85 - 7 62 Thousands/µL    Immature Grans Absolute 0 04 0 00 - 0 20 Thousand/uL    Lymphocytes Absolute 0 61 0 60 - 4 47 Thousands/µL    Monocytes Absolute 0 26 0 17 - 1 22 Thousand/µL    Eosinophils Absolute 0 15 0 00 - 0 61 Thousand/µL    Basophils Absolute 0 03 0 00 - 0 10 Thousands/µL

## 2022-11-22 ENCOUNTER — OFFICE VISIT (OUTPATIENT)
Dept: NEPHROLOGY | Facility: CLINIC | Age: 82
End: 2022-11-22

## 2022-11-22 VITALS
DIASTOLIC BLOOD PRESSURE: 62 MMHG | HEART RATE: 60 BPM | SYSTOLIC BLOOD PRESSURE: 132 MMHG | BODY MASS INDEX: 28.14 KG/M2 | HEIGHT: 71 IN | WEIGHT: 201 LBS

## 2022-11-22 DIAGNOSIS — N18.4 BENIGN HYPERTENSION WITH CHRONIC KIDNEY DISEASE, STAGE IV (HCC): ICD-10-CM

## 2022-11-22 DIAGNOSIS — N18.4 STAGE 4 CHRONIC KIDNEY DISEASE (HCC): Primary | ICD-10-CM

## 2022-11-22 DIAGNOSIS — R80.1 PERSISTENT PROTEINURIA: ICD-10-CM

## 2022-11-22 DIAGNOSIS — N25.81 SECONDARY HYPERPARATHYROIDISM OF RENAL ORIGIN (HCC): ICD-10-CM

## 2022-11-22 DIAGNOSIS — E87.20 METABOLIC ACIDOSIS: ICD-10-CM

## 2022-11-22 DIAGNOSIS — E83.42 HYPOMAGNESEMIA: ICD-10-CM

## 2022-11-22 DIAGNOSIS — D64.9 ANEMIA, UNSPECIFIED TYPE: ICD-10-CM

## 2022-11-22 DIAGNOSIS — I12.9 BENIGN HYPERTENSION WITH CHRONIC KIDNEY DISEASE, STAGE IV (HCC): ICD-10-CM

## 2022-11-22 NOTE — PATIENT INSTRUCTIONS
-Renal Function is stable   -You have Chronic Kidney Disease Stage 4   -Avoid NSAIDs like Ibuprofen/Motrin, Naproxen/Aleve, Celebrex, meloxicam/Mobic, Diclofenac and other NSAIDs   -Okay to take Acetaminophen/Tylenol if you do not have any liver problems  -Avoid IV contrast used for CT scan and cardiac catheterization     -If plan for any study with IV contrast, please let me know so we could hydrate with fluids before and after IV contrast  -Dosage  of certain medications may need to be adjusted depending on Kidney function       BP stable   Labs in one months  Follow up in 3 months with labs prior to visit - With Juan Manuel Parkinson

## 2022-11-29 ENCOUNTER — APPOINTMENT (OUTPATIENT)
Dept: LAB | Facility: CLINIC | Age: 82
End: 2022-11-29

## 2022-11-29 ENCOUNTER — OFFICE VISIT (OUTPATIENT)
Dept: INTERNAL MEDICINE CLINIC | Facility: CLINIC | Age: 82
End: 2022-11-29

## 2022-11-29 VITALS
TEMPERATURE: 97.6 F | HEART RATE: 62 BPM | RESPIRATION RATE: 19 BRPM | HEIGHT: 69 IN | OXYGEN SATURATION: 96 % | WEIGHT: 199 LBS | SYSTOLIC BLOOD PRESSURE: 136 MMHG | DIASTOLIC BLOOD PRESSURE: 78 MMHG | BODY MASS INDEX: 29.47 KG/M2

## 2022-11-29 DIAGNOSIS — Z23 FLU VACCINE NEED: ICD-10-CM

## 2022-11-29 DIAGNOSIS — I10 BENIGN ESSENTIAL HYPERTENSION: ICD-10-CM

## 2022-11-29 DIAGNOSIS — E11.59 TYPE 2 DIABETES MELLITUS WITH OTHER CIRCULATORY COMPLICATION, WITHOUT LONG-TERM CURRENT USE OF INSULIN (HCC): ICD-10-CM

## 2022-11-29 DIAGNOSIS — D49.59 URETHRAL TUMOR: ICD-10-CM

## 2022-11-29 DIAGNOSIS — C67.8 MALIGNANT NEOPLASM OF OVERLAPPING SITES OF BLADDER (HCC): ICD-10-CM

## 2022-11-29 DIAGNOSIS — R26.2 AMBULATORY DYSFUNCTION: ICD-10-CM

## 2022-11-29 DIAGNOSIS — Z00.00 MEDICARE ANNUAL WELLNESS VISIT, SUBSEQUENT: ICD-10-CM

## 2022-11-29 DIAGNOSIS — I25.10 ARTERIOSCLEROTIC CARDIOVASCULAR DISEASE: Primary | ICD-10-CM

## 2022-11-29 LAB
ALBUMIN SERPL BCP-MCNC: 3.2 G/DL (ref 3.5–5)
ALP SERPL-CCNC: 98 U/L (ref 46–116)
ALT SERPL W P-5'-P-CCNC: 42 U/L (ref 12–78)
ANION GAP SERPL CALCULATED.3IONS-SCNC: 10 MMOL/L (ref 4–13)
AST SERPL W P-5'-P-CCNC: 24 U/L (ref 5–45)
BASOPHILS # BLD AUTO: 0.01 THOUSANDS/ÂΜL (ref 0–0.1)
BASOPHILS NFR BLD AUTO: 0 % (ref 0–1)
BILIRUB SERPL-MCNC: 0.73 MG/DL (ref 0.2–1)
BUN SERPL-MCNC: 49 MG/DL (ref 5–25)
CALCIUM ALBUM COR SERPL-MCNC: 9.5 MG/DL (ref 8.3–10.1)
CALCIUM SERPL-MCNC: 8.9 MG/DL (ref 8.3–10.1)
CHLORIDE SERPL-SCNC: 110 MMOL/L (ref 96–108)
CO2 SERPL-SCNC: 18 MMOL/L (ref 21–32)
CREAT SERPL-MCNC: 2.86 MG/DL (ref 0.6–1.3)
EOSINOPHIL # BLD AUTO: 0.04 THOUSAND/ÂΜL (ref 0–0.61)
EOSINOPHIL NFR BLD AUTO: 1 % (ref 0–6)
ERYTHROCYTE [DISTWIDTH] IN BLOOD BY AUTOMATED COUNT: 23 % (ref 11.6–15.1)
GFR SERPL CREATININE-BSD FRML MDRD: 19 ML/MIN/1.73SQ M
GLUCOSE P FAST SERPL-MCNC: 169 MG/DL (ref 65–99)
HCT VFR BLD AUTO: 26 % (ref 36.5–49.3)
HGB BLD-MCNC: 8.2 G/DL (ref 12–17)
IMM GRANULOCYTES # BLD AUTO: 0.09 THOUSAND/UL (ref 0–0.2)
IMM GRANULOCYTES NFR BLD AUTO: 1 % (ref 0–2)
LYMPHOCYTES # BLD AUTO: 0.5 THOUSANDS/ÂΜL (ref 0.6–4.47)
LYMPHOCYTES NFR BLD AUTO: 8 % (ref 14–44)
MCH RBC QN AUTO: 32.4 PG (ref 26.8–34.3)
MCHC RBC AUTO-ENTMCNC: 31.5 G/DL (ref 31.4–37.4)
MCV RBC AUTO: 103 FL (ref 82–98)
MONOCYTES # BLD AUTO: 1.05 THOUSAND/ÂΜL (ref 0.17–1.22)
MONOCYTES NFR BLD AUTO: 16 % (ref 4–12)
NEUTROPHILS # BLD AUTO: 4.71 THOUSANDS/ÂΜL (ref 1.85–7.62)
NEUTS SEG NFR BLD AUTO: 74 % (ref 43–75)
NRBC BLD AUTO-RTO: 1 /100 WBCS
PLATELET # BLD AUTO: 132 THOUSANDS/UL (ref 149–390)
PMV BLD AUTO: 11.6 FL (ref 8.9–12.7)
POTASSIUM SERPL-SCNC: 4.3 MMOL/L (ref 3.5–5.3)
PROT SERPL-MCNC: 6.9 G/DL (ref 6.4–8.4)
RBC # BLD AUTO: 2.53 MILLION/UL (ref 3.88–5.62)
SODIUM SERPL-SCNC: 138 MMOL/L (ref 135–147)
WBC # BLD AUTO: 6.4 THOUSAND/UL (ref 4.31–10.16)

## 2022-11-29 NOTE — PROGRESS NOTES
Name: Cassius Landau      : 1940      MRN: [id number]  Encounter Provider: Carlos Jones DO  Encounter Date: 2022   Encounter department: [facility]    Assessment & Plan     1  Arteriosclerotic cardiovascular disease  Assessment & Plan:  The patient does have a history atherosclerotic cardiovascular disease  Continues to follow-up with cardiologist and I do have concerns with the patient having some increasing shortness of breath exertion  Be sending the patient for repeat echocardiogram and we will discuss the results once they are obtained  Orders:  -     Echo complete w/ contrast if indicated; Future; Expected date: 2022    2  Flu vaccine need  -     influenza vaccine, high-dose, PF 0 7 mL (FLUZONE HIGH-DOSE)    3  Benign essential hypertension  Assessment & Plan:  Blood pressure is controlled, does have chronic renal insufficiency  Patient is only on a low dose of metoprolol for blood pressure and heart rate control  This may be reduced because of hypotension      4  Type 2 diabetes mellitus with other circulatory complication, without long-term current use of insulin ([facility])  Assessment & Plan:  Hemoglobin A1c 6 5 with last evaluation  Continues with medication as per his endocrinologist   Is checking his blood sugars on a regular basis at home  Knows to call if any difficulty with hypoglycemia  Lab Results   Component Value Date    HGBA1C 6 5 (H) 10/25/2022         5  Ambulatory dysfunction  Assessment & Plan:  Patient has continued deterioration with his ambulatory function  Is having frequent falls decreased strength to both lower extremities  This is all multifactorial including his progressive diabetic peripheral neuropathy undergoing chemotherapy  Underlying cardiac disease  Patient also has severe shortness of breath with exertion which is also of concern    Patient does have regular lab testing performed and did have repeat hemoglobin today showing hemoglobin continues to drop now to 8 5 and he will discuss this with his oncologist   Patient on evaluation has generalized wasting to his muscle tissue bilateral lower extremity  I do feel the patient would benefit from evaluation by physical therapy but I do have concerns with his shortness of breath and his cardiac function  Will obtain echocardiogram and make decisions as to whether further treatment is indicated  Await the results of lab testing that he has on irregular basis  Decisions discussed with the patient and his wife who was in attendance  6  Urethral tumor  Assessment & Plan:  Does have extension of his bladder cancer  Is undergoing chemotherapy under Dr Jaycee Leong direction of his hematologist/oncologist   Is scheduled for repeat CT scan and to make sure that he is having some positive results from treatment  I think his weakness overall his multifactorial including undergoing chemotherapy  Subjective     The patient is an 79-year-old male history of multiple medical problems as outlined previously  Patient is here today for repeat Medicare wellness visit  Accompanied by his wife  Patient has had extensive lab testing over the last month and we did discuss the results with the patient  Patient is undergoing therapy, chemotherapy for his extension of his bladder cancer  States he is continuing to become weaker and they continue to follow his blood counts, persistent and progressive anemia, persistent and progressive peripheral neuropathy with ambulatory dysfunction and episodic falls    Review of Systems   Constitutional: Positive for activity change (Extremely restricted with activity level secondary to frequent falls weakness especially lower extremities), appetite change ( variable appetite) and fatigue  Negative for chills, diaphoresis, fever and unexpected weight change     Respiratory: Positive for shortness of breath ( persistent shortness of breath with exertion)  Negative for apnea, cough, choking, chest tightness, wheezing and stridor  Cardiovascular: Positive for leg swelling ( some chronic edema bilateral lower extremities but no increase)  Negative for chest pain and palpitations  Gastrointestinal: Negative  Endocrine: Negative  Genitourinary: Negative  Musculoskeletal: Positive for arthralgias and gait problem  Negative for back pain, joint swelling, myalgias, neck pain and neck stiffness  Some diffuse arthritic aches and pains but nothing new or severe  Again does have progressive peripheral neuropathy and ambulatory dysfunction lower extremities   Skin: Negative  Allergic/Immunologic: Negative  Neurological: Positive for weakness, light-headedness and numbness  Negative for dizziness, tremors, seizures, syncope, facial asymmetry, speech difficulty and headaches  Hematological: Negative  Psychiatric/Behavioral: Negative for agitation, behavioral problems, confusion, decreased concentration, dysphoric mood, hallucinations, self-injury, sleep disturbance and suicidal ideas  The patient is nervous/anxious ( is anxious about his progressive medical problems)  The patient is not hyperactive          Past Medical History:   Diagnosis Date   • Acute kidney injury ([facility] )    • Arthritis     Hands   • Wright esophagus    • BPH with obstruction/lower urinary tract symptoms    • CAD (coronary artery disease)     Last assessed 09/16/15   • Cancer [facility])     bladder   • Cataract, acquired     Last assessed 10/10/17   • Chronic kidney disease    • Coronary artery disease    • Diabetes mellitus ([facility] )     NIDDM   • Diabetic neuropathy (HCC)     Feet   • Enlarged prostate with lower urinary tract symptoms (LUTS)     Last assessed 10/10/17   • Erectile dysfunction    • GERD (gastroesophageal reflux disease)     Last assessed 10/10/17   • Hemoptysis     Last assessed 03/14/16   • Hypercholesterolemia     Last assessed 10/10/17   • Hypertension Last assessed 10/10/17   • Macular degeneration     Right eye is particularly affected-peripheral vision intact   • Myocardial infarction [facility]) 1998   • OAB (overactive bladder)    • Testicular hypofunction    • Testicular hypogonadism     Last assessed 10/10/17   • Tinnitus    • Umbilical hernia     Last assessed 06/18/14   • Urge incontinence of urine    • Wears glasses      Past Surgical History:   Procedure Laterality Date   • COLONOSCOPY     • CORONARY ARTERY BYPASS GRAFT  07/16/2014    ABG x 4 LIMA to LAD,SVG to diagnoal 2 SVG to OM-1, SVG to PDA, resection of partial plerual mass   • CT GUIDED PERC DRAINAGE CATHETER PLACEMENT  2/19/2021   • CYSTOSCOPY  2013   • CYSTOSCOPY  02/08/2022    [place]   • ESOPHAGOGASTRODUODENOSCOPY     • FL RETROGRADE PYELOGRAM  12/20/2018   • FL RETROGRADE PYELOGRAM  12/5/2019   • INGUINAL HERNIA REPAIR Bilateral 2015   • IR DRAINAGE TUBE CHECK AND/OR REMOVAL  3/5/2021   • PHOTODYNAMIC THERAPY      For Barretts esophagus   • WV COLONOSCOPY FLX DX W/COLLJ SPEC WHEN PFRMD N/A 4/25/2016    Procedure: COLONOSCOPY;  Surgeon: Moira Mauro MD;  Location:  GI LAB; Service: Gastroenterology   • WV CYSTOURETHROSCOPY,BIOPSY N/A 9/10/2020    Procedure: CYSTOSCOPY, COLLECTION OF LEFT KIDNEY CYTOLOGY, BILATERAL RETROGRADE PYELOGRAM, BLADDER WALL BIOPSIES  AND [address], RANDOM BLADDER TUMOR BIOPSIES;  Surgeon: Servando Price MD;  Location: [address] [place] [address] MAIN OR;  Service: Urology   • WV CYSTOURETHROSCOPY,BIOPSY N/A 4/5/2022    Procedure: urethroscopy , biopsy of urethra with fulgeration;  Surgeon: Ming Soler MD;  Location:  MAIN OR;  Service: Urology   • WV CYSTOURETHROSCOPY,FULGUR 2-5 CM LESN N/A 4/16/2020    Procedure: CYSTOSCOPY, TRANSURETHRAL RESECTION OF BLADDER TUMOR (TURBT);   Surgeon: Servando Price MD;  Location[facility];  Service: Urology   • WV CYSTOURETHROSCOPY,FULGUR <0 5 CM LESN N/A 12/5/2019    Procedure: CYSTO W/TURBT AND TRANSURETHRAL PROSTATE BIOPSY AND OPENING OF BLADDER NECK CONTRACTURE, B/L Retrograde pyelogram;  Surgeon: Danny Alas MD;  Location[facility];  Service: Urology   • TONSILLECTOMY     • TRANSURETHRAL RESECTION OF PROSTATE N/A 2018    Procedure: CYSTO, PHOTO SELECTIVE VAPORIZATION OF PROSTATE, B/L RETROGRADE PYELOGRAM, TURBT;  Surgeon: Danny Alas MD;  Location: [facility];  Service: Urology   • UMBILICAL HERNIA REPAIR     • UPPER GASTROINTESTINAL ENDOSCOPY       Family History   Problem Relation Age of Onset   • Cancer Mother    • Other Mother         Digestive System Complications   • Diabetes Father    • Heart disease Father    • Hypertension Father    • Coronary artery disease Father    • Hyperlipidemia Father      Social History     Socioeconomic History   • Marital status: /Civil Union     Spouse name: None   • Number of children: None   • Years of education: None   • Highest education level: None   Occupational History   • Occupation: Sales position   Tobacco Use   • Smoking status: Former     Packs/day: 1 00     Years:  00     Pack years:      Types: Cigarettes     Quit date:      Years since quittin 9   • Smokeless tobacco: Never   Vaping Use   • Vaping Use: Never used   Substance and Sexual Activity   • Alcohol use: Not Currently     Alcohol/week: 2 0 standard drinks     Types: 1 Glasses of wine, 1 Cans of beer per week   • Drug use: Never   • Sexual activity: Not Currently   Other Topics Concern   • None   Social History Narrative    Always uses seatbelt        Caffeine use- Drinks 2 cups of coffee daily     Social Determinants of Health     Financial Resource Strain: Low Risk    • Difficulty of Paying Living Expenses: Not very hard   Food Insecurity: Not on file   Transportation Needs: No Transportation Needs   • Lack of Transportation (Medical): No   • Lack of Transportation (Non-Medical):  No   Physical Activity: Not on file   Stress: Not on file   Social Connections: Not on file   Intimate Partner Violence: Not on file   Housing Stability: Not on file     Current Outpatient Medications on File Prior to Visit   Medication Sig   • atorvastatin (LIPITOR) 40 mg tablet TAKE ONE TABLET BY MOUTH AT BEDTIME   • Blood Glucose Monitoring Suppl (OneTouch Verio Flex System) w/Device KIT USE TO TEST BLOOD GLUCOSE DAILY   • Cholecalciferol (VITAMIN D) 50 MCG (2000 UT) tablet Take 2,000 Units by mouth daily   • citalopram (CeleXA) 20 mg tablet TAKE ONE TABLET BY MOUTH EVERY DAY   • Dapagliflozin Propanediol (Farxiga) 5 MG TABS Take 1 tablet (5 mg total) by mouth daily   • ferrous sulfate 324 (65 Fe) mg Take 1 tablet (324 mg total) by mouth daily   • Lancets (OneTouch Delica Plus [id number]) MISC TEST BLOOD GLUCOSE ONCE DAILY   • magnesium oxide (MAG-OX) 400 mg Take 1 tablet (400 mg total) by mouth in the morning  • metoprolol tartrate (LOPRESSOR) 50 mg tablet Take 1 tablet (50 mg total) by mouth 2 (two) times a day (Patient taking differently: Take 25 mg by mouth 2 (two) times a day)   • Multiple Vitamins-Minerals (OCUVITE-LUTEIN PO) Take 1 tablet by mouth 2 (two) times a day Pt is taking preservision    • nystatin (MYCOSTATIN) powder Apply topically 3 (three) times a day (Patient taking differently: Apply topically as needed)   • omeprazole (PriLOSEC) 40 MG capsule Take 1 capsule (40 mg total) by mouth in the morning     • ondansetron (ZOFRAN) 4 mg tablet Take 1 tablet (4 mg total) by mouth every 8 (eight) hours as needed for nausea or vomiting   • OneTouch Verio test strip TEST ONCE A DAY   • sodium bicarbonate 650 mg tablet TAKE 2 TABLETS BY MOUTH TWICE A DAY   • traMADol (ULTRAM) 50 mg tablet TAKE ONE TABLET BY MOUTH EVERY 6 HOURS AS NEEDED FOR MODERATE PAIN   • vitamin E, tocopherol, 400 units capsule Take 400 Units by mouth daily     Allergies   Allergen Reactions   • Fentanyl Hallucinations   • Morphine And Related Other (See Comments)     While having an MI patient received morphine and had adverse reaction but doesn't know what happened  Immunization History   Administered Date(s) Administered   • COVID-19 MODERNA VACC 0 5 ML IM 01/18/2021, 02/15/2021   • Influenza Split High Dose Preservative Free IM 10/10/2013, 09/22/2014, 10/11/2016   • Influenza, high dose seasonal 0 7 mL 01/22/2019, 10/08/2019, 09/03/2020, 11/23/2021, 11/29/2022   • Pneumococcal Conjugate 13-Valent 05/23/2019   • Pneumococcal Polysaccharide PPV23 03/11/2014       Objective     /78 (BP Location: Left arm, Patient Position: Sitting, Cuff Size: Adult)   Pulse 62   Temp 97 6 °F (36 4 °C) (Tympanic)   Resp 19   Ht 5' 9" (1 753 m)   Wt 90 3 kg (199 lb)   SpO2 96%   BMI 29 39 kg/m²     Physical Exam  Vitals and nursing note reviewed  Constitutional:       General: He is not in acute distress  Appearance: He is ill-appearing  He is not toxic-appearing or diaphoretic  Comments: A debilitated 60-year-old male who is awake alert, accompanied by his wife unsteady gait the some tachypnea with ambulation, jaundice skin   HENT:      Head: Normocephalic and atraumatic  Right Ear: Tympanic membrane, ear canal and external ear normal  There is no impacted cerumen  Left Ear: Tympanic membrane, ear canal and external ear normal  There is no impacted cerumen  Nose: Nose normal  No congestion or rhinorrhea  Mouth/Throat:      Mouth: Mucous membranes are moist       Pharynx: No oropharyngeal exudate or posterior oropharyngeal erythema  Comments:  pale mucous membranes  Eyes:      General: No scleral icterus  Right eye: No discharge  Left eye: No discharge  Extraocular Movements: Extraocular movements intact  Pupils: Pupils are equal, round, and reactive to light  Comments: Pale conjunctiva   Neck:      Vascular: No carotid bruit  Cardiovascular:      Rate and Rhythm: Normal rate and regular rhythm  Heart sounds: Normal heart sounds  No murmur heard  No friction rub  No gallop  Pulmonary:      Effort: Pulmonary effort is normal  No respiratory distress  Breath sounds: No stridor  No wheezing, rhonchi or rales  Comments: Slightly decreased breath sounds anteriorly and posteriorly but no rales rhonchi or wheezes, O2 sat 98% at rest  Chest:      Chest wall: No tenderness  Abdominal:      General: Bowel sounds are normal  There is no distension  Palpations: Abdomen is soft  There is no mass  Tenderness: There is no abdominal tenderness  There is no right CVA tenderness, left CVA tenderness, guarding or rebound  Hernia: No hernia is present  Musculoskeletal:         General: No swelling, tenderness, deformity or signs of injury  Cervical back: Normal range of motion and neck supple  No rigidity or tenderness  Right lower leg: Edema (Trace to +1 edema bilateral lower extremities) present  Left lower leg: Edema present  Lymphadenopathy:      Cervical: No cervical adenopathy  Skin:     General: Skin is warm and dry  Coloration: Skin is pale  Skin is not jaundiced  Findings: No bruising, erythema, lesion or rash  Comments: Some yellow his tinge to the skin surface   Neurological:      Mental Status: He is alert and oriented to person, place, and time  Cranial Nerves: No cranial nerve deficit  Sensory: Sensory deficit present  Motor: Weakness present  Coordination: Coordination abnormal       Gait: Gait abnormal       Deep Tendon Reflexes: Reflexes abnormal       Comments: Patient is had progression of peripheral neuropathy to both lower extremities, generalize muscle wasting and weakness again to lower extremities  Absent patella and Achilles tendon reflexes  Psychiatric:         Thought Content:  Thought content normal          Judgment: Judgment normal       Comments: Mildly flat mood and affect       Elvie Presley DO

## 2022-11-29 NOTE — ASSESSMENT & PLAN NOTE
Does have extension of his bladder cancer  Is undergoing chemotherapy under Dr Gil Recinos direction of his hematologist/oncologist   Is scheduled for repeat CT scan and to make sure that he is having some positive results from treatment  I think his weakness overall his multifactorial including undergoing chemotherapy

## 2022-11-29 NOTE — ASSESSMENT & PLAN NOTE
Patient is an 70-year-old male the with a history of multiple medical problems as outlined previously who is here today for repeat Medicare wellness visit  Accompanied by his wife  Is undergoing chemotherapy for progression of his bladder cancer status post resection in the past   Is having problems with increasing difficulties with ambulation were frequent falls    Follows up with his oncologist, cardiologist, urologist, endocrinologist

## 2022-11-29 NOTE — ASSESSMENT & PLAN NOTE
Hemoglobin A1c 6 5 with last evaluation  Continues with medication as per his endocrinologist   Is checking his blood sugars on a regular basis at home    Knows to call if any difficulty with hypoglycemia  Lab Results   Component Value Date    HGBA1C 6 5 (H) 10/25/2022

## 2022-11-29 NOTE — PROGRESS NOTES
Assessment and Plan:     Problem List Items Addressed This Visit    None       Preventive health issues were discussed with patient, and age appropriate screening tests were ordered as noted in patient's After Visit Summary  Personalized health advice and appropriate referrals for health education or preventive services given if needed, as noted in patient's After Visit Summary       History of Present Illness:     Patient presents for a Medicare Wellness Visit    HPI   Patient Care Team:  Romy Mason DO as PCP - Loree Lowenstein, MD Romelle Mourning, PA-C Eddye Coles, MD Mariana Holter, MD Brenda Sports, MD Rhea Cavalier, DO Rockne Perla, MD as [facility] (Ophthalmology)     Review of Systems:     Review of Systems     Problem List:     Patient Active Problem List   Diagnosis   • Backache   • Benign essential hypertension   • DMII (diabetes mellitus, type 2) ([place] [address] )   • Hyperlipidemia   • Wright's esophagus with esophagitis   • Acute kidney injury [facility])   • Overactive bladder   • Bladder tumor   • Type 2 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 ([place] [address] )   • Hx of CABG   • Malignant neoplasm of overlapping sites of bladder ([place] [address] )   • Closed fracture of upper end of right fibula   • History of bladder cancer   • Coronary artery disease involving native coronary artery of native heart without angina pectoris   • Ischemic cardiomyopathy   • Positive urinary cytology   • Malignant neoplasm of urinary bladder neck ([place] [address] )   • Liver function study, abnormal   • Elevated troponin   • Forearm mass, left   • Fungal dermatitis   • Anxiety and depression   • Overweight   • Ambulatory dysfunction   • Frequent falls   • Right sided Pelvic abscess in male [facility])   • Severe protein-calorie malnutrition ([place] [address] )   • Metabolic acidosis   • Hypomagnesemia   • RBBB   • Benign hypertension with chronic kidney disease, stage IV ([place] [address] )   • Persistent proteinuria   • Anemia   • Chronic kidney disease-mineral and bone disorder   • Visual hallucinations   • Functional diarrhea   • Platelets decreased (HCC)   • Urethral tumor   • Secondary hyperparathyroidism of renal origin [facility])   • Stage 4 chronic kidney disease (HCC)      Past Medical and Surgical History:     Past Medical History:   Diagnosis Date   • Acute kidney injury ([facility] )    • Arthritis     Hands   • Wright esophagus    • BPH with obstruction/lower urinary tract symptoms    • CAD (coronary artery disease)     Last assessed 09/16/15   • Cancer ([place] [address] )     bladder   • Cataract, acquired     Last assessed 10/10/17   • Chronic kidney disease    • Coronary artery disease    • Diabetes mellitus ([facility] )     NIDDM   • Diabetic neuropathy ([place] [address] )     Feet   • Enlarged prostate with lower urinary tract symptoms (LUTS)     Last assessed 10/10/17   • Erectile dysfunction    • GERD (gastroesophageal reflux disease)     Last assessed 10/10/17   • Hemoptysis     Last assessed 03/14/16   • Hypercholesterolemia     Last assessed 10/10/17   • Hypertension     Last assessed 10/10/17   • Macular degeneration     Right eye is particularly affected-peripheral vision intact   • Myocardial infarction [facility]) 1998   • OAB (overactive bladder)    • Testicular hypofunction    • Testicular hypogonadism     Last assessed 10/10/17   • Tinnitus    • Umbilical hernia     Last assessed 06/18/14   • Urge incontinence of urine    • Wears glasses      Past Surgical History:   Procedure Laterality Date   • COLONOSCOPY     • CORONARY ARTERY BYPASS GRAFT  07/16/2014    ABG x 4 LIMA to LAD,SVG to diagnoal 2 SVG to OM-1, SVG to PDA, resection of partial plerual mass   • CT GUIDED [facility] DRAINAGE CATHETER PLACEMENT  2/19/2021   • CYSTOSCOPY  2013   • CYSTOSCOPY  02/08/2022    Olya   • ESOPHAGOGASTRODUODENOSCOPY     • FL RETROGRADE PYELOGRAM  12/20/2018   • FL RETROGRADE PYELOGRAM  12/5/2019   • INGUINAL HERNIA REPAIR Bilateral 2015   • IR DRAINAGE TUBE CHECK AND/OR REMOVAL  3/5/2021   • PHOTODYNAMIC THERAPY      For Barretts esophagus   • SC COLONOSCOPY FLX DX W/COLLJ SPEC WHEN PFRMD N/A 4/25/2016    Procedure: COLONOSCOPY;  Surgeon: Will Diaz MD;  Location:  GI LAB; Service: Gastroenterology   • SC CYSTOURETHROSCOPY,BIOPSY N/A 9/10/2020    Procedure: CYSTOSCOPY, COLLECTION OF LEFT KIDNEY CYTOLOGY, BILATERAL RETROGRADE PYELOGRAM, BLADDER WALL BIOPSIES  AND Leva Givens, RANDOM BLADDER TUMOR BIOPSIES;  Surgeon: Alexis Barton MD;  Location: [address] [place] [address] MAIN OR;  Service: Urology   • SC CYSTOURETHROSCOPY,BIOPSY N/A 4/5/2022    Procedure: urethroscopy , biopsy of urethra with fulgeration;  Surgeon: Mary Mascorro MD;  Location:  MAIN OR;  Service: Urology   • SC CYSTOURETHROSCOPY,FULGUR 2-5 CM LESN N/A 4/16/2020    Procedure: CYSTOSCOPY, TRANSURETHRAL RESECTION OF BLADDER TUMOR (TURBT);   Surgeon: Alexis Barton MD;  [facility];  Service: Urology   • SC CYSTOURETHROSCOPY,FULGUR <0 5 CM LESN N/A 12/5/2019    Procedure: CYSTO W/TURBT AND TRANSURETHRAL PROSTATE BIOPSY AND OPENING OF BLADDER NECK CONTRACTURE, B/L Retrograde pyelogram;  Surgeon: Alexis Barton MD;  [facility];  Service: Urology   • TONSILLECTOMY     • TRANSURETHRAL RESECTION OF PROSTATE N/A 12/20/2018    Procedure: CYSTO, PHOTO SELECTIVE VAPORIZATION OF PROSTATE, B/L RETROGRADE PYELOGRAM, TURBT;  Surgeon: Alexis Barton MD;  [facility];  Service: Urology   • UMBILICAL HERNIA REPAIR  2012   • UPPER GASTROINTESTINAL ENDOSCOPY        Family History:     Family History   Problem Relation Age of Onset   • Cancer Mother    • Other Mother         Digestive System Complications   • Diabetes Father    • Heart disease Father    • Hypertension Father    • Coronary artery disease Father    • Hyperlipidemia Father       Social History:     Social History     Socioeconomic History   • Marital status: /Civil Union     Spouse name: None   • Number of children: None   • Years of education: None   • Highest education level: None   Occupational History   • Occupation: Sales position   Tobacco Use   • Smoking status: Former     Packs/day: 1 00     Years: 13 00     Pack years: 13      Types: Cigarettes     Quit date:      Years since quittin 9   • Smokeless tobacco: Never   Vaping Use   • Vaping Use: Never used   Substance and Sexual Activity   • Alcohol use: Not Currently     Alcohol/week: 2 0 standard drinks     Types: 1 Glasses of wine, 1 Cans of beer per week   • Drug use: Never   • Sexual activity: Not Currently   Other Topics Concern   • None   Social History Narrative    Always uses seatbelt        Caffeine use- Drinks 2 cups of coffee daily     Social Determinants of Health     Financial Resource Strain: Not on file   Food Insecurity: Not on file   Transportation Needs: Not on file   Physical Activity: Not on file   Stress: Not on file   Social Connections: Not on file   Intimate Partner Violence: Not on file   Housing Stability: Not on file      Medications and Allergies:     Current Outpatient Medications   Medication Sig Dispense Refill   • atorvastatin (LIPITOR) 40 mg tablet TAKE ONE TABLET BY MOUTH AT BEDTIME 90 tablet 3   • Blood Glucose Monitoring Suppl (OneTouch Verio Flex System) w/Device KIT USE TO TEST BLOOD GLUCOSE DAILY 1 kit 0   • Cholecalciferol (VITAMIN D) 50 MCG (2000 UT) tablet Take 2,000 Units by mouth daily     • citalopram (CeleXA) 20 mg tablet TAKE ONE TABLET BY MOUTH EVERY DAY 30 tablet 3   • Dapagliflozin Propanediol (Farxiga) 5 MG TABS Take 1 tablet (5 mg total) by mouth daily 30 tablet 2   • ferrous sulfate 324 (65 Fe) mg Take 1 tablet (324 mg total) by mouth daily 30 tablet 5   • Lancets (OneTouch Delica Plus [id number]) MISC TEST BLOOD GLUCOSE ONCE DAILY 100 each 0   • magnesium oxide (MAG-OX) 400 mg Take 1 tablet (400 mg total) by mouth in the morning   180 tablet 2   • metoprolol tartrate (LOPRESSOR) 50 mg tablet Take 1 tablet (50 mg total) by mouth 2 (two) times a day (Patient taking differently: Take 25 mg by mouth 2 (two) times a day) 180 tablet 3   • Multiple Vitamins-Minerals (OCUVITE-LUTEIN PO) Take 1 tablet by mouth 2 (two) times a day Pt is taking preservision      • nystatin (MYCOSTATIN) powder Apply topically 3 (three) times a day (Patient taking differently: Apply topically as needed) 30 g 3   • omeprazole (PriLOSEC) 40 MG capsule Take 1 capsule (40 mg total) by mouth in the morning  90 capsule 3   • ondansetron (ZOFRAN) 4 mg tablet Take 1 tablet (4 mg total) by mouth every 8 (eight) hours as needed for nausea or vomiting 20 tablet 1   • OneTouch Verio test strip TEST ONCE A  strip 0   • sodium bicarbonate 650 mg tablet TAKE 2 TABLETS BY MOUTH TWICE A  tablet 3   • traMADol (ULTRAM) 50 mg tablet TAKE ONE TABLET BY MOUTH EVERY 6 HOURS AS NEEDED FOR MODERATE PAIN 20 tablet 0   • vitamin E, tocopherol, 400 units capsule Take 400 Units by mouth daily       No current facility-administered medications for this visit  Allergies   Allergen Reactions   • Fentanyl Hallucinations   • Morphine And Related Other (See Comments)     While having an MI patient received morphine and had adverse reaction but doesn't know what happened        Immunizations:     Immunization History   Administered Date(s) Administered   • COVID-19 MODERNA VACC 0 5 ML IM 01/18/2021, 02/15/2021   • Influenza Split High Dose Preservative Free IM 10/10/2013, 09/22/2014, 10/11/2016   • Influenza, high dose seasonal 0 7 mL 01/22/2019, 10/08/2019, 09/03/2020, 11/23/2021   • Pneumococcal Conjugate 13-Valent 05/23/2019   • Pneumococcal Polysaccharide PPV23 03/11/2014      Health Maintenance:         Topic Date Due   • Hepatitis C Screening  Completed   • Colorectal Cancer Screening  Discontinued         Topic Date Due   • Hepatitis B Vaccine (1 of 3 - 3-dose series) Never done   • COVID-19 Vaccine (3 - Booster for Moderna series) 07/15/2021   • Influenza Vaccine (1) 09/01/2022      Medicare Screening Tests and Risk Assessments:     Miller Brooks is here for his Subsequent Wellness visit  Last Medicare Wellness visit information reviewed, patient interviewed and updates made to the record today  Health Risk Assessment:   Patient rates overall health as poor  Patient feels that their physical health rating is same  Patient is satisfied with their life  Eyesight was rated as slightly worse  Hearing was rated as slightly worse  Patient feels that their emotional and mental health rating is much better  Patients states they are never, rarely angry  Patient states they are never, rarely unusually tired/fatigued  Pain experienced in the last 7 days has been none  Patient states that he has experienced no weight loss or gain in last 6 months  Fall Risk Screening: In the past year, patient has experienced: history of falling in past year    Number of falls: 2 or more  Injured during fall?: Yes    Feels unsteady when standing or walking?: Yes    Worried about falling?: Yes      Home Safety:  Patient has trouble with stairs inside or outside of their home  Patient has working smoke alarms and has working carbon monoxide detector  Home safety hazards include: none  Nutrition:   Current diet is Regular  Medications:   Patient is not currently taking any over-the-counter supplements  Patient is able to manage medications  Activities of Daily Living (ADLs)/Instrumental Activities of Daily Living (IADLs):   Walk and transfer into and out of bed and chair?: Yes  Dress and groom yourself?: Yes    Bathe or shower yourself?: Yes    Feed yourself? Yes  Do your laundry/housekeeping?: Yes  Manage your money, pay your bills and track your expenses?: Yes  Make your own meals?: Yes    Do your own shopping?: Yes    Previous Hospitalizations:   Any hospitalizations or ED visits within the last 12 months?: No      Advance Care Planning:   Living will: Yes    Advanced directive:  Yes PREVENTIVE SCREENINGS      Cardiovascular Screening:    General: Screening Not Indicated and History Lipid Disorder      Diabetes Screening:     General: Screening Not Indicated and History Diabetes      Colorectal Cancer Screening:     General: Screening Current      Prostate Cancer Screening:    General: Screening Not Indicated      Abdominal Aortic Aneurysm (AAA) Screening:    Risk factors include: tobacco use        Lung Cancer Screening:     General: Screening Not Indicated      Hepatitis C Screening:    General: Screening Current    Screening, Brief Intervention, and Referral to Treatment (SBIRT)    Screening  Typical number of drinks in a day: 0  Typical number of drinks in a week: 0  Interpretation: Low risk drinking behavior  AUDIT-C Screenin) How often did you have a drink containing alcohol in the past year? never  2) How many drinks did you have on a typical day when you were drinking in the past year? 0  3) How often did you have 6 or more drinks on one occasion in the past year? never    AUDIT-C Score: 0  Interpretation: Score 0-3 (male): Negative screen for alcohol misuse    Single Item Drug Screening:  How often have you used an illegal drug (including marijuana) or a prescription medication for non-medical reasons in the past year? never    Single Item Drug Screen Score: 0  Interpretation: Negative screen for possible drug use disorder    No results found       Physical Exam:     /78 (BP Location: Left arm, Patient Position: Sitting, Cuff Size: Adult)   Pulse 62   Temp 97 6 °F (36 4 °C) (Tympanic)   Resp 19   Ht 5' 9" (1 753 m)   Wt 90 3 kg (199 lb)   SpO2 96%   BMI 29 39 kg/m²     Physical Exam     Jatinder Johnston,

## 2022-11-29 NOTE — ASSESSMENT & PLAN NOTE
The patient does have a history atherosclerotic cardiovascular disease  Continues to follow-up with cardiologist and I do have concerns with the patient having some increasing shortness of breath exertion  Be sending the patient for repeat echocardiogram and we will discuss the results once they are obtained

## 2022-11-29 NOTE — ASSESSMENT & PLAN NOTE
Patient has continued deterioration with his ambulatory function  Is having frequent falls decreased strength to both lower extremities  This is all multifactorial including his progressive diabetic peripheral neuropathy undergoing chemotherapy  Underlying cardiac disease  Patient also has severe shortness of breath with exertion which is also of concern  Patient does have regular lab testing performed and did have repeat hemoglobin today showing hemoglobin continues to drop now to 8 5 and he will discuss this with his oncologist   Patient on evaluation has generalized wasting to his muscle tissue bilateral lower extremity  I do feel the patient would benefit from evaluation by physical therapy but I do have concerns with his shortness of breath and his cardiac function  Will obtain echocardiogram and make decisions as to whether further treatment is indicated  Await the results of lab testing that he has on irregular basis  Decisions discussed with the patient and his wife who was in attendance

## 2022-11-29 NOTE — ASSESSMENT & PLAN NOTE
Blood pressure is controlled, does have chronic renal insufficiency  Patient is only on a low dose of metoprolol for blood pressure and heart rate control    This may be reduced because of hypotension

## 2022-11-30 ENCOUNTER — APPOINTMENT (OUTPATIENT)
Dept: NON INVASIVE DIAGNOSTICS | Facility: CLINIC | Age: 82
End: 2022-11-30

## 2022-11-30 ENCOUNTER — HOSPITAL ENCOUNTER (OUTPATIENT)
Dept: RADIOLOGY | Facility: HOSPITAL | Age: 82
Discharge: HOME | End: 2022-11-30
Attending: INTERNAL MEDICINE

## 2022-11-30 ENCOUNTER — TELEPHONE (OUTPATIENT)
Dept: ADMINISTRATIVE | Facility: OTHER | Age: 82
End: 2022-11-30

## 2022-11-30 DIAGNOSIS — C67.8 MALIGNANT NEOPLASM OF OVERLAPPING SITES OF BLADDER (HCC): ICD-10-CM

## 2022-11-30 RX ORDER — SODIUM CHLORIDE 9 MG/ML
20 INJECTION, SOLUTION INTRAVENOUS ONCE
Status: CANCELLED | OUTPATIENT
Start: 2022-12-01

## 2022-11-30 RX ORDER — SODIUM CHLORIDE 9 MG/ML
20 INJECTION, SOLUTION INTRAVENOUS ONCE
Status: CANCELLED | OUTPATIENT
Start: 2022-12-08

## 2022-11-30 NOTE — TELEPHONE ENCOUNTER
----- Message from Margo Tam MA sent at 11/29/2022  2:59 PM EST -----  Regarding: eye exam  11/29/22 2:59 PM    Hello, our patient Jihan Sweeney has had eye exam completed/performed   Please assist in updating the patient chart by media The date of service is 10/20/2022    Thank you,  Margo Tam MA  [facility] INTERNAL MED

## 2022-12-01 ENCOUNTER — HOSPITAL ENCOUNTER (OUTPATIENT)
Dept: INFUSION CENTER | Facility: HOSPITAL | Age: 82
Discharge: HOME | End: 2022-12-01
Attending: INTERNAL MEDICINE

## 2022-12-01 VITALS
DIASTOLIC BLOOD PRESSURE: 70 MMHG | BODY MASS INDEX: 29.94 KG/M2 | HEIGHT: 69 IN | RESPIRATION RATE: 16 BRPM | TEMPERATURE: 97 F | HEART RATE: 61 BPM | SYSTOLIC BLOOD PRESSURE: 159 MMHG | WEIGHT: 202.16 LBS

## 2022-12-01 DIAGNOSIS — C67.8 MALIGNANT NEOPLASM OF OVERLAPPING SITES OF BLADDER (HCC): Primary | ICD-10-CM

## 2022-12-01 RX ORDER — SODIUM CHLORIDE 9 MG/ML
20 INJECTION, SOLUTION INTRAVENOUS ONCE
Status: COMPLETED | OUTPATIENT
Start: 2022-12-01 | End: 2022-12-01

## 2022-12-01 RX ADMIN — GEMCITABINE 1576 MG: 38 INJECTION, SOLUTION INTRAVENOUS at 11:32

## 2022-12-01 RX ADMIN — DEXAMETHASONE SODIUM PHOSPHATE: 10 INJECTION, SOLUTION INTRAMUSCULAR; INTRAVENOUS at 10:36

## 2022-12-01 RX ADMIN — SODIUM CHLORIDE 20 ML/HR: 0.9 INJECTION, SOLUTION INTRAVENOUS at 10:36

## 2022-12-01 RX ADMIN — FOSAPREPITANT 150 MG: 150 INJECTION, POWDER, LYOPHILIZED, FOR SOLUTION INTRAVENOUS at 10:58

## 2022-12-01 RX ADMIN — CARBOPLATIN 183.6 MG: 10 INJECTION, SOLUTION INTRAVENOUS at 12:06

## 2022-12-01 NOTE — PROGRESS NOTES
[facility] OUT taken from [facility], ok to treat with Hgb 8 2 and per Katerina Matt, dose reduce Gemzar to 800mg/m2   Pharmacy made aware

## 2022-12-01 NOTE — TELEPHONE ENCOUNTER
Upon review of the In Basket request we were able to locate, review, and update the patient chart as requested for Diabetic Eye Exam     Any additional questions or concerns should be emailed to the Practice Liaisons via the appropriate education email address, please do not reply via In Basket      Thank you  Humberto Handy MA

## 2022-12-01 NOTE — PROGRESS NOTES
Title: Med Time Out    Date patient scheduled: 12/1    Original medication ordered: Gemcitabine 1000mg/m2    New Medication ordered: Gemcitabine 800mg/m2     Office RN notified patient? ? Pt currently at [address] Pan with [employer]

## 2022-12-02 ENCOUNTER — HOSPITAL ENCOUNTER (OUTPATIENT)
Dept: NON INVASIVE DIAGNOSTICS | Age: 82
Discharge: HOME | End: 2022-12-02

## 2022-12-02 VITALS
BODY MASS INDEX: 29.47 KG/M2 | WEIGHT: 199 LBS | SYSTOLIC BLOOD PRESSURE: 136 MMHG | HEART RATE: 54 BPM | HEIGHT: 69 IN | DIASTOLIC BLOOD PRESSURE: 78 MMHG

## 2022-12-02 DIAGNOSIS — I25.10 ARTERIOSCLEROTIC CARDIOVASCULAR DISEASE: ICD-10-CM

## 2022-12-02 LAB
AORTIC ROOT: 3.5 CM
AORTIC VALVE MEAN VELOCITY: 9.1 M/S
APICAL FOUR CHAMBER EJECTION FRACTION: 52 %
ASCENDING AORTA: 3.2 CM
AV AREA BY CONTINUOUS VTI: 2.6 CM2
AV AREA PEAK VELOCITY: 2.3 CM2
AV LVOT MEAN GRADIENT: 2 MMHG
AV LVOT PEAK GRADIENT: 3 MMHG
AV MEAN GRADIENT: 4 MMHG
AV PEAK GRADIENT: 8 MMHG
AV VALVE AREA: 2.63 CM2
AV VELOCITY RATIO: 0.61
DOP CALC AO PEAK VEL: 1.42 M/S
DOP CALC AO VTI: 28.49 CM
DOP CALC LVOT AREA: 3.8 CM2
DOP CALC LVOT DIAMETER: 2.2 CM
DOP CALC LVOT PEAK VEL VTI: 19.7 CM
DOP CALC LVOT PEAK VEL: 0.86 M/S
DOP CALC LVOT STROKE INDEX: 35.1 ML/M2
DOP CALC LVOT STROKE VOLUME: 74.85 CM3
E WAVE DECELERATION TIME: 111 MS
FRACTIONAL SHORTENING: 22 % (ref 28–44)
INTERVENTRICULAR SEPTUM IN DIASTOLE (PARASTERNAL SHORT AXIS VIEW): 1.2 CM
INTERVENTRICULAR SEPTUM: 1.2 CM (ref 0.6–1.1)
LAAS-AP2: 34.1 CM2
LAAS-AP4: 28.7 CM2
LEFT ATRIUM SIZE: 4 CM
LEFT INTERNAL DIMENSION IN SYSTOLE: 3.5 CM (ref 2.1–4)
LEFT VENTRICLE DIASTOLIC VOLUME (MOD BIPLANE): 163 ML
LEFT VENTRICLE SYSTOLIC VOLUME (MOD BIPLANE): 68 ML
LEFT VENTRICULAR INTERNAL DIMENSION IN DIASTOLE: 4.5 CM (ref 3.5–6)
LEFT VENTRICULAR POSTERIOR WALL IN END DIASTOLE: 1.1 CM
LEFT VENTRICULAR STROKE VOLUME: 45 ML
LV EF: 58 %
LVSV (TEICH): 45 ML
MV E'TISSUE VEL-SEP: 7 CM/S
MV PEAK A VEL: 0.4 M/S
MV PEAK E VEL: 112 CM/S
MV STENOSIS PRESSURE HALF TIME: 32 MS
MV VALVE AREA P 1/2 METHOD: 6.88 CM2
RIGHT ATRIUM AREA SYSTOLE A4C: 19.7 CM2
RIGHT VENTRICLE ID DIMENSION: 3.8 CM
SL CV LEFT ATRIUM LENGTH A2C: 7.2 CM
SL CV PED ECHO LEFT VENTRICLE DIASTOLIC VOLUME (MOD BIPLANE) 2D: 95 ML
SL CV PED ECHO LEFT VENTRICLE SYSTOLIC VOLUME (MOD BIPLANE) 2D: 50 ML
TR MAX PG: 53 MMHG
TR PEAK VELOCITY: 3.7 M/S
TRICUSPID VALVE PEAK REGURGITATION VELOCITY: 3.65 M/S

## 2022-12-05 DIAGNOSIS — I10 ESSENTIAL HYPERTENSION, BENIGN: ICD-10-CM

## 2022-12-05 DIAGNOSIS — E11.3293 TYPE 2 DIABETES MELLITUS WITH BOTH EYES AFFECTED BY MILD NONPROLIFERATIVE RETINOPATHY WITHOUT MACULAR EDEMA, WITHOUT LONG-TERM CURRENT USE OF INSULIN (HCC): ICD-10-CM

## 2022-12-05 LAB
AORTIC ROOT: 3.8 CM
AORTIC VALVE MEAN VELOCITY: 9.1 M/S
APICAL FOUR CHAMBER EJECTION FRACTION: 52 %
ASCENDING AORTA: 3.2 CM
AV AREA BY CONTINUOUS VTI: 2.6 CM2
AV AREA PEAK VELOCITY: 2.3 CM2
AV LVOT MEAN GRADIENT: 2 MMHG
AV LVOT PEAK GRADIENT: 3 MMHG
AV MEAN GRADIENT: 4 MMHG
AV PEAK GRADIENT: 8 MMHG
AV VALVE AREA: 2.63 CM2
AV VELOCITY RATIO: 0.61
DOP CALC AO PEAK VEL: 1.42 M/S
DOP CALC AO VTI: 28.49 CM
DOP CALC LVOT AREA: 3.8 CM2
DOP CALC LVOT DIAMETER: 2.2 CM
DOP CALC LVOT PEAK VEL VTI: 19.7 CM
DOP CALC LVOT PEAK VEL: 0.86 M/S
DOP CALC LVOT STROKE INDEX: 35.1 ML/M2
DOP CALC LVOT STROKE VOLUME: 74.85 CM3
E WAVE DECELERATION TIME: 111 MS
FRACTIONAL SHORTENING: 22 % (ref 28–44)
GLOBAL LONGITUIDAL STRAIN: -12 %
INTERVENTRICULAR SEPTUM IN DIASTOLE (PARASTERNAL SHORT AXIS VIEW): 1.4 CM
INTERVENTRICULAR SEPTUM: 1.2 CM (ref 0.6–1.1)
LAAS-AP2: 34.1 CM2
LAAS-AP4: 28.7 CM2
LEFT ATRIUM SIZE: 4 CM
LEFT INTERNAL DIMENSION IN SYSTOLE: 3.5 CM (ref 2.1–4)
LEFT VENTRICLE DIASTOLIC VOLUME (MOD BIPLANE): 163 ML
LEFT VENTRICLE SYSTOLIC VOLUME (MOD BIPLANE): 68 ML
LEFT VENTRICULAR INTERNAL DIMENSION IN DIASTOLE: 4.5 CM (ref 3.5–6)
LEFT VENTRICULAR POSTERIOR WALL IN END DIASTOLE: 1 CM
LEFT VENTRICULAR STROKE VOLUME: 45 ML
LV EF: 58 %
LVSV (TEICH): 45 ML
MV E'TISSUE VEL-SEP: 7 CM/S
MV PEAK A VEL: 0.4 M/S
MV PEAK E VEL: 112 CM/S
MV STENOSIS PRESSURE HALF TIME: 32 MS
MV VALVE AREA P 1/2 METHOD: 6.88 CM2
RA PRESSURE ESTIMATED: 5 MMHG
RIGHT ATRIUM AREA SYSTOLE A4C: 19.7 CM2
RIGHT VENTRICLE ID DIMENSION: 3.8 CM
RV PSP: 58 MMHG
SL CV LEFT ATRIUM LENGTH A2C: 7.2 CM
SL CV LV EF: 45
SL CV PED ECHO LEFT VENTRICLE DIASTOLIC VOLUME (MOD BIPLANE) 2D: 95 ML
SL CV PED ECHO LEFT VENTRICLE SYSTOLIC VOLUME (MOD BIPLANE) 2D: 50 ML
TR MAX PG: 53 MMHG
TR PEAK VELOCITY: 3.7 M/S
TRICUSPID VALVE PEAK REGURGITATION VELOCITY: 3.65 M/S

## 2022-12-05 RX ORDER — DAPAGLIFLOZIN 5 MG/1
TABLET, FILM COATED ORAL
Qty: 90 TABLET | Refills: 2 | Status: SHIPPED | OUTPATIENT
Start: 2022-12-05

## 2022-12-05 RX ORDER — METOPROLOL TARTRATE 50 MG/1
TABLET, FILM COATED ORAL
Qty: 180 TABLET | Refills: 3 | Status: SHIPPED | OUTPATIENT
Start: 2022-12-05

## 2022-12-06 ENCOUNTER — APPOINTMENT (OUTPATIENT)
Dept: LAB | Facility: CLINIC | Age: 82
End: 2022-12-06

## 2022-12-06 DIAGNOSIS — C67.8 MALIGNANT NEOPLASM OF OVERLAPPING SITES OF BLADDER (HCC): ICD-10-CM

## 2022-12-06 LAB
BASOPHILS # BLD AUTO: 0.01 THOUSANDS/ÂΜL (ref 0–0.1)
BASOPHILS NFR BLD AUTO: 0 % (ref 0–1)
EOSINOPHIL # BLD AUTO: 0.03 THOUSAND/ÂΜL (ref 0–0.61)
EOSINOPHIL NFR BLD AUTO: 1 % (ref 0–6)
ERYTHROCYTE [DISTWIDTH] IN BLOOD BY AUTOMATED COUNT: 22.2 % (ref 11.6–15.1)
HCT VFR BLD AUTO: 27 % (ref 36.5–49.3)
HGB BLD-MCNC: 8.3 G/DL (ref 12–17)
IMM GRANULOCYTES # BLD AUTO: 0.09 THOUSAND/UL (ref 0–0.2)
IMM GRANULOCYTES NFR BLD AUTO: 2 % (ref 0–2)
LYMPHOCYTES # BLD AUTO: 0.37 THOUSANDS/ÂΜL (ref 0.6–4.47)
LYMPHOCYTES NFR BLD AUTO: 7 % (ref 14–44)
MCH RBC QN AUTO: 31.7 PG (ref 26.8–34.3)
MCHC RBC AUTO-ENTMCNC: 30.7 G/DL (ref 31.4–37.4)
MCV RBC AUTO: 103 FL (ref 82–98)
MONOCYTES # BLD AUTO: 0.18 THOUSAND/ÂΜL (ref 0.17–1.22)
MONOCYTES NFR BLD AUTO: 4 % (ref 4–12)
NEUTROPHILS # BLD AUTO: 4.53 THOUSANDS/ÂΜL (ref 1.85–7.62)
NEUTS SEG NFR BLD AUTO: 86 % (ref 43–75)
NRBC BLD AUTO-RTO: 1 /100 WBCS
PLATELET # BLD AUTO: 326 THOUSANDS/UL (ref 149–390)
PMV BLD AUTO: 11.8 FL (ref 8.9–12.7)
RBC # BLD AUTO: 2.62 MILLION/UL (ref 3.88–5.62)
WBC # BLD AUTO: 5.21 THOUSAND/UL (ref 4.31–10.16)

## 2022-12-07 ENCOUNTER — OFFICE VISIT (OUTPATIENT)
Dept: HEMATOLOGY ONCOLOGY | Facility: CLINIC | Age: 82
End: 2022-12-07

## 2022-12-07 ENCOUNTER — TELEPHONE (OUTPATIENT)
Dept: HEMATOLOGY ONCOLOGY | Facility: CLINIC | Age: 82
End: 2022-12-07

## 2022-12-07 VITALS
TEMPERATURE: 97.2 F | BODY MASS INDEX: 30.07 KG/M2 | WEIGHT: 203 LBS | RESPIRATION RATE: 16 BRPM | SYSTOLIC BLOOD PRESSURE: 130 MMHG | OXYGEN SATURATION: 96 % | HEART RATE: 65 BPM | HEIGHT: 69 IN | DIASTOLIC BLOOD PRESSURE: 80 MMHG

## 2022-12-07 DIAGNOSIS — D63.1 ANEMIA DUE TO STAGE 4 CHRONIC KIDNEY DISEASE (HCC): Primary | ICD-10-CM

## 2022-12-07 DIAGNOSIS — R82.89 POSITIVE URINARY CYTOLOGY: ICD-10-CM

## 2022-12-07 DIAGNOSIS — N18.4 ANEMIA DUE TO STAGE 4 CHRONIC KIDNEY DISEASE (HCC): Primary | ICD-10-CM

## 2022-12-07 DIAGNOSIS — C67.5 MALIGNANT NEOPLASM OF URINARY BLADDER NECK (HCC): ICD-10-CM

## 2022-12-07 DIAGNOSIS — C67.8 MALIGNANT NEOPLASM OF OVERLAPPING SITES OF BLADDER (HCC): ICD-10-CM

## 2022-12-07 DIAGNOSIS — D49.4 BLADDER TUMOR: ICD-10-CM

## 2022-12-07 NOTE — TELEPHONE ENCOUNTER
[facility]: Please cancel treatment on 12/22 and 12/29  Please schedule out patients Aranesp  Every 21 days first treatment tomorrow

## 2022-12-07 NOTE — PROGRESS NOTES
Per Dr Lidia Julien no treatment tomorrow, Aranesp only  Pt aware  TIME OUT performed with [facility] notifying them of the change

## 2022-12-07 NOTE — PROGRESS NOTES
Hematology Outpatient Follow - Up Note  Daija Lim 80 y o  male MRN: @ Encounter: [id number]        Date:  12/7/2022        Assessment/ Plan:    80-year-old  male with superficial bladder cancers status post many BCG ease with refractoriness to BCG ease status post mitomycin instillation, cystoscopy on 04/2020 showed carcinoma in situ no evidence of invasive component     He received Pembrolizumab 200 mg flat dose every 3 weeks on 05/26/2020 after 2 cycles he had grade 3 elevation of the liver enzymes, thickening of the skin of the forearms consistent with toxicity to Pembrolizumab     Now with recurrent disease in the right iliac, pelvic area with extension into the aortic bifurcation      Evaluated at [facility], we feel the patient is candidate for chemotherapy utilizing gemcitabine/carboplatin this is stage IV disease      Initiated on carboplatin/gemcitabine on 09/30/2022, CT scan on December 2022 showed no evidence of disease in the abdomen or pelvis however he is complicated with anemia    Hold chemotherapy at this time    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 Aranesp 100 mcg every 21 days hold if hemoglobin below or equal to 11  Molecular tests showed TERT ARID1A mutation which he might be a candidate for PARP inhibitor in the future          Labs and imaging studies are reviewed by ordering provider once results are available  If there are findings that need immediate attention, you will be contacted when results available  Discussing results and the implication on your healthcare is best discussed in person at your follow-up visit         HPI:    80-year-old  male with history of hypertension, coronary artery disease status post open heart surgery, diabetes mellitus type 2, diabetic neuropathy, he had a history of recurrent superficial bladder cancer status post many BCG unfortunately refractory to BCG, he had 1 time trans urethral resection of the bladder tumor and mitomycin instillation     Repeat cystoscopy on 04/16/2020 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, no evidence of invasive bladder cancers     He does not smoke, he drinks bourbon and vodka every night     Denies any headache blurred vision nausea vomiting diarrhea constipation dysuria hematuria melena hematochezia heat or cold intolerance  Initiated on Pembrolizumab 200 mg flat dose every 3 weeks on 05/26/2020, after 2 doses there was grade 3 elevation in the liver enzyme, alkaline phosphatase requiring discontinuation of Pembrolizumab, and later on normalization of the liver enzymes     Evaluated by ID there was no evidence of TB of the liver     Recurrence of disease on 09/2022 showed multiple suspicious enlarged lymph nodes that are extended internal iliac lymph nodes up to the aortoiliac bifurcation, evaluated at [address], he was not a candidate for surgical resection    Initiated on gemcitabine/carboplatin on 9 32,020 to 2 weeks on 1 week off, he received 3 cycles complicated with anemia requiring holding chemotherapy in December 2022, CAT scan in December 2000 3-2 showed no evidence of lymphadenopathy in the abdomen or pelvis  Interval History:    Interval History:        Previous Treatment:         Test Results:    Imaging: CT abdomen pelvis wo contrast    Result Date: 12/3/2022  Narrative: CT ABDOMEN AND PELVIS WITHOUT IV CONTRAST INDICATION:   C67 8: Malignant neoplasm of overlapping sites of bladder  "Follow-up bladder cancer " COMPARISON:  CT dated 10/8/2021 TECHNIQUE:  CT examination of the abdomen and pelvis was performed without intravenous contrast  Axial, sagittal, and coronal 2D reformatted images were created from the source data and submitted for interpretation  Radiation dose length product (DLP) for this visit:  999 87 mGy-cm     This examination, like all CT scans performed in the [address] [place] [address]  [address], was performed utilizing techniques to minimize radiation dose exposure, including the use of iterative  reconstruction and automated exposure control  Enteric contrast was not administered  FINDINGS: ABDOMEN The absence of intravenous contrast limits evaluation of certain processes, especially infectious, inflammatory and neoplastic processes  LOWER CHEST:  Small bilateral pleural effusions  Cardiomegaly  LIVER/BILIARY TREE:  Unremarkable  GALLBLADDER:  There are gallstone(s) within the gallbladder, without pericholecystic inflammatory changes  SPLEEN:  Unremarkable  PANCREAS:  Unremarkable  ADRENAL GLANDS:  Unremarkable  KIDNEYS/URETERS:  No hydronephrosis  Right lower quadrant urinary diversion with an ileal conduit, stable in appearance  STOMACH AND BOWEL:  There is a tiny duodenal diverticulum  Small bowel anastomosis in the right lower quadrant  Mild sigmoid colonic diverticulosis  No bowel obstruction  APPENDIX:  No findings to suggest appendicitis  ABDOMINOPELVIC CAVITY:  Trace pelvic ascites  No pneumoperitoneum  Infiltration of the mesenteric fat, nonspecific possibly related to CHF or third spacing of fluid  Multiple surgical clips along the iliac vessels, likely postoperative changes related to pelvic sidewall lymph node dissection  VESSELS:  Unremarkable for patient's age  PELVIS REPRODUCTIVE ORGANS:  Unremarkable for patient's age  URINARY BLADDER:  Status post cystectomy  Status post prostatectomy ABDOMINAL WALL/INGUINAL REGIONS:  Right lower quadrant intra-abdominal wall ileal conduit for urinary diversion  OSSEOUS STRUCTURES:  No acute fracture or destructive osseous lesion  Spinal degenerative changes are noted  Impression: 1  Small bilateral pleural effusions  2   Cholelithiasis  3   Stable postoperative changes related to cystoprostatectomy with urinary diversion to an ileal conduit   4   Trace pelvic ascites and infiltration of mesenteric fat, nonspecific probably related to CHF or third spacing of fluid  5   No definite evidence of recurrence in the abdomen or pelvis allowing for limitations for lack of intravenous contrast  Workstation performed: [id number]     Echo complete w/ contrast if indicated    Result Date: 12/5/2022  Narrative: •  Left Ventricle: Left ventricular cavity size is normal  There is moderate asymmetric hypertrophy of the septal wall  The left ventricular ejection fraction is 45% by visual estimation  Systolic function is mildly reduced  Global longitudinal strain is reduced at -12%  There is mild global hypokinesis with specific regional wall abnormalities  Diastolic function is severely abnormal, consistent with ungraded restrictive relaxation  •  The following segments are akinetic: basal inferior and mid inferior  •  IVS: There is both systolic and diastolic flattening of the interventricular septum consistent with right ventricle pressure and volume overload  •  Right Ventricle: Systolic function is moderately reduced  •  Left Atrium: The atrium is severely dilated  •  Right Atrium: The atrium is dilated  •  Mitral Valve: There is mild to moderate regurgitation  •  Tricuspid Valve: The right ventricular systolic pressure is severely elevated  The estimated right ventricular systolic pressure is 28 71 mmHg  Strain was performed to quantify interventricular dyssynchrony and evaluate components of myocardial dysfunction  Results from the utilization of Strain Analysis are listed in the report below  Compared to prior study dated 89/58/78, the LV systolic function appears similar; LV diastolic function is worse; valve function is not significantly changed; RV function is worse; PASP is now severely elevated         Labs:   Lab Results   Component Value Date    WBC 5 21 12/06/2022    HGB 8 3 (L) 12/06/2022    HCT 27 0 (L) 12/06/2022     (H) 12/06/2022     12/06/2022     Lab Results   Component Value Date     08/13/2015    K 4 3 11/29/2022     (H) 11/29/2022    CO2 18 (L) 11/29/2022    ANIONGAP 10 08/13/2015    BUN 49 (H) 11/29/2022    CREATININE 2 86 (H) 11/29/2022    GLUCOSE 128 08/13/2015    GLUF 169 (H) 11/29/2022    CALCIUM 8 9 11/29/2022    CORRECTEDCA 9 5 11/29/2022    AST 24 11/29/2022    ALT 42 11/29/2022    ALKPHOS 98 11/29/2022    PROT 6 8 08/13/2015    BILITOT 0 58 08/13/2015    EGFR 19 11/29/2022       Lab Results   Component Value Date    IRON 45 (L) 09/27/2021    TIBC 283 09/27/2021    FERRITIN 88 09/27/2021       Lab Results   Component Value Date    GHXSVLUS51 666 08/13/2015         ROS: Review of Systems   Constitutional: Positive for appetite change and fatigue  Negative for chills, diaphoresis, fever and unexpected weight change  HENT:   Negative for hearing loss, lump/mass, mouth sores, nosebleeds, sore throat, trouble swallowing and voice change  Eyes: Negative  Negative for eye problems and icterus  Respiratory: Positive for shortness of breath and wheezing  Negative for chest tightness and cough  Cardiovascular: Negative for chest pain and leg swelling  Gastrointestinal: Negative for abdominal distention, abdominal pain, blood in stool, constipation, diarrhea and nausea  Endocrine: Negative  Genitourinary: Negative for dysuria, frequency, hematuria and pelvic pain  Musculoskeletal: Negative  Negative for arthralgias, back pain, flank pain, gait problem, myalgias and neck stiffness  Skin: Negative for itching and rash  Neurological: Negative for dizziness, gait problem, headaches, light-headedness, numbness and speech difficulty  Hematological: Negative for adenopathy  Does not bruise/bleed easily  Psychiatric/Behavioral: Negative for confusion, decreased concentration, depression and sleep disturbance  The patient is not nervous/anxious             Current Medications: Reviewed  Allergies: Reviewed  PMH/FH/SH:  Reviewed      Physical Exam:    Body surface area is 2 08 meters squared  Wt Readings from Last 3 Encounters:   22 92 1 kg (203 lb)   22 90 3 kg (199 lb)   22 91 7 kg (202 lb 2 6 oz)        Temp Readings from Last 3 Encounters:   22 (!) 97 2 °F (36 2 °C)   22 (!) 97 °F (36 1 °C) (Temporal)   22 97 6 °F (36 4 °C) (Tympanic)        BP Readings from Last 3 Encounters:   22 130/80   22 136/78   22 159/70         Pulse Readings from Last 3 Encounters:   22 65   22 (!) 54   22 61        Physical Exam  Vitals reviewed  Constitutional:       General: He is not in acute distress  Appearance: He is well-developed  He is not diaphoretic  HENT:      Head: Normocephalic and atraumatic  Eyes:      Conjunctiva/sclera: Conjunctivae normal    Neck:      Trachea: No tracheal deviation  Cardiovascular:      Rate and Rhythm: Regular rhythm  Tachycardia present  Heart sounds: No murmur heard  No friction rub  No gallop  Pulmonary:      Effort: Pulmonary effort is normal  No respiratory distress  Breath sounds: Rales (Of the right lower lobe) present  No wheezing  Chest:      Chest wall: No tenderness  Abdominal:      General: There is no distension  Palpations: Abdomen is soft  Tenderness: There is no abdominal tenderness  Musculoskeletal:      Cervical back: Normal range of motion and neck supple  Right lower leg: No edema  Left lower leg: No edema  Lymphadenopathy:      Cervical: No cervical adenopathy  Skin:     General: Skin is warm and dry  Coloration: Skin is not pale  Findings: No erythema  Neurological:      Mental Status: He is alert and oriented to person, place, and time  Psychiatric:         Behavior: Behavior normal          Thought Content: Thought content normal          Judgment: Judgment normal          ECO  Goals and Barriers:  Current Goal: Minimize effects of disease  Barriers: None        Patient's Capacity to Self Care:  Patient is able to self care      Code Status: [unfilled]

## 2022-12-08 ENCOUNTER — HOSPITAL ENCOUNTER (OUTPATIENT)
Dept: INFUSION CENTER | Facility: HOSPITAL | Age: 82
Discharge: HOME | End: 2022-12-08
Attending: INTERNAL MEDICINE

## 2022-12-08 VITALS
SYSTOLIC BLOOD PRESSURE: 148 MMHG | OXYGEN SATURATION: 98 % | HEART RATE: 65 BPM | RESPIRATION RATE: 22 BRPM | TEMPERATURE: 97 F | DIASTOLIC BLOOD PRESSURE: 72 MMHG

## 2022-12-08 DIAGNOSIS — R82.89 POSITIVE URINARY CYTOLOGY: ICD-10-CM

## 2022-12-08 DIAGNOSIS — D49.4 BLADDER TUMOR: ICD-10-CM

## 2022-12-08 DIAGNOSIS — C67.5 MALIGNANT NEOPLASM OF URINARY BLADDER NECK (HCC): ICD-10-CM

## 2022-12-08 DIAGNOSIS — N18.4 ANEMIA DUE TO STAGE 4 CHRONIC KIDNEY DISEASE (HCC): ICD-10-CM

## 2022-12-08 DIAGNOSIS — C67.8 MALIGNANT NEOPLASM OF OVERLAPPING SITES OF BLADDER (HCC): Primary | ICD-10-CM

## 2022-12-08 DIAGNOSIS — D63.1 ANEMIA DUE TO STAGE 4 CHRONIC KIDNEY DISEASE (HCC): ICD-10-CM

## 2022-12-08 RX ADMIN — DARBEPOETIN ALFA 100 MCG: 100 INJECTION, SOLUTION INTRAVENOUS; SUBCUTANEOUS at 10:43

## 2022-12-09 ENCOUNTER — RA CDI HCC (OUTPATIENT)
Dept: OTHER | Facility: HOSPITAL | Age: 82
End: 2022-12-09

## 2022-12-09 NOTE — PROGRESS NOTES
E11 22  [facility]  coding opportunities          Chart Reviewed number of suggestions sent to Provider: 1     Patients Insurance     Medicare Insurance: [employer]

## 2022-12-14 ENCOUNTER — OFFICE VISIT (OUTPATIENT)
Dept: INTERNAL MEDICINE CLINIC | Facility: CLINIC | Age: 82
End: 2022-12-14

## 2022-12-14 ENCOUNTER — TELEPHONE (OUTPATIENT)
Dept: NEPHROLOGY | Facility: CLINIC | Age: 82
End: 2022-12-14

## 2022-12-14 VITALS
WEIGHT: 207 LBS | OXYGEN SATURATION: 95 % | HEIGHT: 69 IN | HEART RATE: 65 BPM | DIASTOLIC BLOOD PRESSURE: 78 MMHG | BODY MASS INDEX: 30.66 KG/M2 | SYSTOLIC BLOOD PRESSURE: 160 MMHG | RESPIRATION RATE: 17 BRPM | TEMPERATURE: 98 F

## 2022-12-14 DIAGNOSIS — I42.9 CARDIOMYOPATHY, UNSPECIFIED TYPE (HCC): ICD-10-CM

## 2022-12-14 DIAGNOSIS — I25.10 ARTERIOSCLEROTIC CARDIOVASCULAR DISEASE: ICD-10-CM

## 2022-12-14 DIAGNOSIS — D63.1 ANEMIA DUE TO STAGE 4 CHRONIC KIDNEY DISEASE (HCC): ICD-10-CM

## 2022-12-14 DIAGNOSIS — I10 BENIGN ESSENTIAL HYPERTENSION: ICD-10-CM

## 2022-12-14 DIAGNOSIS — I10 ESSENTIAL HYPERTENSION, BENIGN: Primary | ICD-10-CM

## 2022-12-14 DIAGNOSIS — N17.9 AKI (ACUTE KIDNEY INJURY) (HCC): Primary | ICD-10-CM

## 2022-12-14 DIAGNOSIS — N18.4 ANEMIA DUE TO STAGE 4 CHRONIC KIDNEY DISEASE (HCC): ICD-10-CM

## 2022-12-14 DIAGNOSIS — E87.20 METABOLIC ACIDOSIS: ICD-10-CM

## 2022-12-14 DIAGNOSIS — R06.02 SHORTNESS OF BREATH: Primary | ICD-10-CM

## 2022-12-14 RX ORDER — TORSEMIDE 20 MG/1
20 TABLET ORAL DAILY
Qty: 20 TABLET | Refills: 0 | Status: SHIPPED | OUTPATIENT
Start: 2022-12-14

## 2022-12-14 RX ORDER — LOSARTAN POTASSIUM 25 MG/1
25 TABLET ORAL DAILY
Qty: 30 TABLET | Refills: 3 | Status: SHIPPED | OUTPATIENT
Start: 2022-12-14

## 2022-12-14 RX ORDER — SODIUM BICARBONATE 650 MG/1
1300 TABLET ORAL 3 TIMES DAILY
Qty: 360 TABLET | Refills: 3 | Status: SHIPPED | OUTPATIENT
Start: 2022-12-14 | End: 2022-12-20 | Stop reason: SDUPTHER

## 2022-12-14 NOTE — TELEPHONE ENCOUNTER
Not able to reach patient-left message, repeat labs from 11/29, also discussed with PCP earlier today who mention patient was having some shortness of breath and was concerned about fluid overload, reviewed recent CT abdomen pelvis report which was suggestive of bilateral pleural effusion and cholelithiasis  As well as finding of pelvic ascites  Renal function worsened to cr 2 8   Bicarb 18   On labs from 11/29 hemoglobin was 8 3    Plan:  Discussed with PCP recommended torsemide 20 mg daily, monitor chest x-ray which has been ordered by PCP and repeat BMP coming Monday  Also in the setting of low bicarb increase the dose of sodium bicarbonate to 2 tablet 3 times daily  -Patient was also started on losartan by PCP today for elevated blood pressure  will closely monitor renal function and potassium level  -BMP on Monday as ordered by PCP and again BMP in 2-week    -MA TEAM-please arrange for sooner follow-up with me or an AP within the next 1 to 2 weeks

## 2022-12-14 NOTE — ASSESSMENT & PLAN NOTE
Patient is here today for evaluation  He has been seen by oncology and is 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  Because of decrease in hemoglobin level his chemotherapy has been stopped  Hemoglobin is still low and continues to follow-up with hematology/oncology  Consequences of his low hemoglobin is increasing shortness of breath increasing swelling to feet and ankles secondary to third spacing  This is further aggravated with history of coronary artery disease and recent echo showing his ischemic cardiomyopathy  We placed a consult for the patient to be seen by cardiology and hopefully can have some improvement with his anemia

## 2022-12-14 NOTE — ASSESSMENT & PLAN NOTE
Recent concerns as far as patient's cardiac function with the patient having some increasing shortness of breath with exertion  Is showing evidence of being fluid overloaded and difficulties with anemia  We do have concerns with his chronic kidney disease stage IV  I placed the patient on a very low dose of losartan at 25 mg daily in the hopes of helping with some better diuresis and may be some improvement with his cardiac function with the angiotensin receptor blocker  Placed a consult evaluation by cardiology to see    He was told if worsening symptoms we need to have him evaluated in the hospital

## 2022-12-14 NOTE — ASSESSMENT & PLAN NOTE
Patient has a longstanding history of hypertension  As noted with initial presentation in the office today blood pressure was elevated  We do have concerns with this particular patient and we placed him on a very low dose of losartan to see if this helps with his blood pressure control and may also help with his cardiac function  Check on his renal function next week  Consult was placed for him to be seen by nephrology as soon as possible

## 2022-12-14 NOTE — ASSESSMENT & PLAN NOTE
Discussed with his nephrologist   Avril Miller placing the patient on a low-dose of diuretic for a few days to see if this helps with his fluid overload check on his renal function early next week    We will obtain chest x-ray

## 2022-12-14 NOTE — PROGRESS NOTES
Name: Italo Raman      : 1940      MRN: [id number]  Encounter Provider: Edwin Pineda DO  Encounter Date: 2022   Encounter department: [address] [place] [address] INTERNAL MEDICINE    Assessment & Plan     1  Essential hypertension, benign  -     losartan (COZAAR) 25 mg tablet; Take 1 tablet (25 mg total) by mouth daily  -     Basic metabolic panel; Future    2  Arteriosclerotic cardiovascular disease  Assessment & Plan:  Recent concerns as far as patient's cardiac function with the patient having some increasing shortness of breath with exertion  Is showing evidence of being fluid overloaded and difficulties with anemia  We do have concerns with his chronic kidney disease stage IV  I placed the patient on a very low dose of losartan at 25 mg daily in the hopes of helping with some better diuresis and may be some improvement with his cardiac function with the angiotensin receptor blocker  Placed a consult evaluation by cardiology to see  He was told if worsening symptoms we need to have him evaluated in the hospital    Orders:  -     Ambulatory Referral to Cardiology; Future    3  Cardiomyopathy, unspecified type [facility])  -     Ambulatory Referral to Cardiology; Future    4  Anemia due to stage 4 chronic kidney disease [facility])  Assessment & Plan:  Patient is here today for evaluation  He has been seen by oncology and is 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  Because of decrease in hemoglobin level his chemotherapy has been stopped  Hemoglobin is still low and continues to follow-up with hematology/oncology  Consequences of his low hemoglobin is increasing shortness of breath increasing swelling to feet and ankles secondary to third spacing  This is further aggravated with history of coronary artery disease and recent echo showing his ischemic cardiomyopathy    We placed a consult for the patient to be seen by cardiology and hopefully can have some improvement with his anemia  Orders:  -     Ambulatory Referral to Nephrology; Future    5  Benign essential hypertension  Assessment & Plan:  Patient has a longstanding history of hypertension  As noted with initial presentation in the office today blood pressure was elevated  We do have concerns with this particular patient and we placed him on a very low dose of losartan to see if this helps with his blood pressure control and may also help with his cardiac function  Check on his renal function next week  Consult was placed for him to be seen by nephrology as soon as possible  Subjective     Patient is an 20-year-old male with history of extensive medical problems as outlined previously who is here today accompanied by his wife for follow-up  He did have a recent echocardiogram performed looking at his cardiac function, history of ischemic cardiomyopathy  Since last seen patient has had problems with increasing shortness of breath  Diagnosed with profound anemia by his oncologist and worsening of his renal function  Review of Systems   Constitutional: Positive for activity change and fatigue  Negative for appetite change, chills, diaphoresis, fever and unexpected weight change  Extremely limited with activity level secondary to shortness of breath and fatigue  HENT: Negative  Eyes: Negative  Respiratory: Positive for shortness of breath  Negative for apnea, cough, choking, chest tightness, wheezing and stridor  Cardiovascular: Positive for leg swelling  Negative for chest pain and palpitations  Has noted some increased swelling bilateral lower extremities worse on the right than the left   Gastrointestinal: Negative  Endocrine: Negative  Genitourinary: Positive for decreased urine volume   Negative for difficulty urinating, dysuria, enuresis, flank pain, frequency, genital sores, hematuria, penile discharge, penile pain, penile swelling, scrotal swelling, testicular pain and urgency  Musculoskeletal: Negative  Skin: Negative  Allergic/Immunologic: Negative  Neurological: Negative  Hematological: Negative  Psychiatric/Behavioral: Negative          Past Medical History:   Diagnosis Date   • Acute kidney injury ([facility] )    • Arthritis     Hands   • Wright esophagus    • BPH with obstruction/lower urinary tract symptoms    • CAD (coronary artery disease)     Last assessed 09/16/15   • Cancer [facility])     bladder   • Cataract, acquired     Last assessed 10/10/17   • Chronic kidney disease    • Coronary artery disease    • Diabetes mellitus ([facility] )     NIDDM   • Diabetic neuropathy ([facility] )     Feet   • Enlarged prostate with lower urinary tract symptoms (LUTS)     Last assessed 10/10/17   • Erectile dysfunction    • GERD (gastroesophageal reflux disease)     Last assessed 10/10/17   • Hemoptysis     Last assessed 03/14/16   • Hypercholesterolemia     Last assessed 10/10/17   • Hypertension     Last assessed 10/10/17   • Macular degeneration     Right eye is particularly affected-peripheral vision intact   • Myocardial infarction [facility]) 1998   • OAB (overactive bladder)    • Testicular hypofunction    • Testicular hypogonadism     Last assessed 10/10/17   • Tinnitus    • Umbilical hernia     Last assessed 06/18/14   • Urge incontinence of urine    • Wears glasses      Past Surgical History:   Procedure Laterality Date   • COLONOSCOPY     • CORONARY ARTERY BYPASS GRAFT  07/16/2014    ABG x 4 LIMA to LAD,SVG to diagnoal 2 SVG to OM-1, SVG to PDA, resection of partial plerual mass   • CT GUIDED PERC DRAINAGE CATHETER PLACEMENT  2/19/2021   • CYSTOSCOPY  2013   • CYSTOSCOPY  02/08/2022    Olya   • ESOPHAGOGASTRODUODENOSCOPY     • FL RETROGRADE PYELOGRAM  12/20/2018   • FL RETROGRADE PYELOGRAM  12/5/2019   • INGUINAL HERNIA REPAIR Bilateral 2015   • IR DRAINAGE TUBE CHECK AND/OR REMOVAL  3/5/2021   • PHOTODYNAMIC THERAPY      For Barretts esophagus   • VT COLONOSCOPY FLX DX W/COLLJ SPEC WHEN PFRMD N/A 4/25/2016    Procedure: COLONOSCOPY;  Surgeon: Lisa Walton MD;  Location:  GI LAB; Service: Gastroenterology   • TN CYSTOURETHROSCOPY,BIOPSY N/A 9/10/2020    Procedure: CYSTOSCOPY, COLLECTION OF LEFT KIDNEY CYTOLOGY, BILATERAL RETROGRADE PYELOGRAM, BLADDER WALL BIOPSIES  AND [address], RANDOM BLADDER TUMOR BIOPSIES;  Surgeon: Genie Barron MD;  Location: [address] [place] [address] MAIN OR;  Service: Urology   • TN CYSTOURETHROSCOPY,BIOPSY N/A 4/5/2022    Procedure: urethroscopy , biopsy of urethra with fulgeration;  Surgeon: Gregorio Castañeda MD;  Location:  MAIN OR;  Service: Urology   • TN CYSTOURETHROSCOPY,FULGUR 2-5 CM LESN N/A 4/16/2020    Procedure: CYSTOSCOPY, TRANSURETHRAL RESECTION OF BLADDER TUMOR (TURBT);   Surgeon: Genie Barron MD;  Location: [facility];  Service: Urology   • TN CYSTOURETHROSCOPY,FULGUR <0 5 CM LESN N/A 12/5/2019    Procedure: CYSTO W/TURBT AND TRANSURETHRAL PROSTATE BIOPSY AND OPENING OF BLADDER NECK CONTRACTURE, B/L Retrograde pyelogram;  Surgeon: Genie Barron MD;  Location: [facility];  Service: Urology   • TONSILLECTOMY     • TRANSURETHRAL RESECTION OF PROSTATE N/A 12/20/2018    Procedure: CYSTO, PHOTO SELECTIVE VAPORIZATION OF PROSTATE, B/L RETROGRADE PYELOGRAM, TURBT;  Surgeon: Genie Barron MD;  Location: [facility];  Service: Urology   • UMBILICAL HERNIA REPAIR  2012   • UPPER GASTROINTESTINAL ENDOSCOPY       Family History   Problem Relation Age of Onset   • Cancer Mother    • Other Mother         Digestive System Complications   • Diabetes Father    • Heart disease Father    • Hypertension Father    • Coronary artery disease Father    • Hyperlipidemia Father      Social History     Socioeconomic History   • Marital status: /Civil Union     Spouse name: None   • Number of children: None   • Years of education: None   • Highest education level: None   Occupational History   • Occupation: Sales position   Tobacco Use   • Smoking status: Former Packs/day: 1 00     Years:      Pack years:      Types: Cigarettes     Quit date: 0     Years since quittin 9   • Smokeless tobacco: Never   Vaping Use   • Vaping Use: Never used   Substance and Sexual Activity   • Alcohol use: Not Currently     Alcohol/week: 2 0 standard drinks     Types: 1 Glasses of wine, 1 Cans of beer per week   • Drug use: Never   • Sexual activity: Not Currently   Other Topics Concern   • None   Social History Narrative    Always uses seatbelt        Caffeine use- Drinks 2 cups of coffee daily     Social Determinants of Health     Financial Resource Strain: Low Risk    • Difficulty of Paying Living Expenses: Not very hard   Food Insecurity: Not on file   Transportation Needs: No Transportation Needs   • Lack of Transportation (Medical): No   • Lack of Transportation (Non-Medical): No   Physical Activity: Not on file   Stress: Not on file   Social Connections: Not on file   Intimate Partner Violence: Not on file   Housing Stability: Not on file     Current Outpatient Medications on File Prior to Visit   Medication Sig   • atorvastatin (LIPITOR) 40 mg tablet TAKE ONE TABLET BY MOUTH AT BEDTIME   • Blood Glucose Monitoring Suppl (OneTouch Verio Flex System) w/Device KIT USE TO TEST BLOOD GLUCOSE DAILY   • Cholecalciferol (VITAMIN D) 50 MCG (2000 UT) tablet Take 2,000 Units by mouth daily   • citalopram (CeleXA) 20 mg tablet TAKE ONE TABLET BY MOUTH EVERY DAY   • Farxiga 5 MG TABS TAKE ONE TABLET BY MOUTH EVERY DAY   • ferrous sulfate 324 (65 Fe) mg Take 1 tablet (324 mg total) by mouth daily   • Lancets (OneTouch Delica Plus [id number]) MISC TEST BLOOD GLUCOSE ONCE DAILY   • magnesium oxide (MAG-OX) 400 mg Take 1 tablet (400 mg total) by mouth in the morning     • metoprolol tartrate (LOPRESSOR) 50 mg tablet TAKE ONE TABLET BY MOUTH TWICE A DAY   • Multiple Vitamins-Minerals (OCUVITE-LUTEIN PO) Take 1 tablet by mouth 2 (two) times a day Pt is taking preservision    • nystatin (MYCOSTATIN) powder Apply topically 3 (three) times a day (Patient taking differently: Apply topically as needed)   • omeprazole (PriLOSEC) 40 MG capsule Take 1 capsule (40 mg total) by mouth in the morning  • ondansetron (ZOFRAN) 4 mg tablet Take 1 tablet (4 mg total) by mouth every 8 (eight) hours as needed for nausea or vomiting   • OneTouch Verio test strip TEST ONCE A DAY   • sodium bicarbonate 650 mg tablet TAKE 2 TABLETS BY MOUTH TWICE A DAY   • traMADol (ULTRAM) 50 mg tablet TAKE ONE TABLET BY MOUTH EVERY 6 HOURS AS NEEDED FOR MODERATE PAIN   • vitamin E, tocopherol, 400 units capsule Take 400 Units by mouth daily     Allergies   Allergen Reactions   • Fentanyl Hallucinations   • Morphine And Related Other (See Comments)     While having an MI patient received morphine and had adverse reaction but doesn't know what happened  Immunization History   Administered Date(s) Administered   • COVID-19 MODERNA VACC 0 5 ML IM 01/18/2021, 02/15/2021   • Influenza Split High Dose Preservative Free IM 10/10/2013, 09/22/2014, 10/11/2016   • Influenza, high dose seasonal 0 7 mL 01/22/2019, 10/08/2019, 09/03/2020, 11/23/2021, 11/29/2022   • Pneumococcal Conjugate 13-Valent 05/23/2019   • Pneumococcal Polysaccharide PPV23 03/11/2014       Objective     /78 (BP Location: Left arm, Patient Position: Sitting, Cuff Size: Adult)   Pulse 65   Temp 98 °F (36 7 °C) (Tympanic)   Resp 17   Ht 5' 9" (1 753 m)   Wt 93 9 kg (207 lb)   SpO2 95%   BMI 30 57 kg/m²     Physical Exam  Vitals and nursing note reviewed  Constitutional:       General: He is not in acute distress  Appearance: He is ill-appearing  He is not toxic-appearing or diaphoretic  Comments: Pleasant obese fatigued 17-year-old male who is awake alert  Accompanied by his wife  Yellow tint to the skin   HENT:      Head: Normocephalic and atraumatic        Right Ear: Tympanic membrane normal       Left Ear: Tympanic membrane normal       Nose: Nose normal  No congestion or rhinorrhea  Mouth/Throat:      Mouth: Mucous membranes are moist       Pharynx: Oropharynx is clear  No oropharyngeal exudate or posterior oropharyngeal erythema  Eyes:      General: No scleral icterus  Right eye: No discharge  Left eye: No discharge  Extraocular Movements: Extraocular movements intact  Conjunctiva/sclera: Conjunctivae normal       Pupils: Pupils are equal, round, and reactive to light  Neck:      Vascular: No carotid bruit  Cardiovascular:      Rate and Rhythm: Normal rate and regular rhythm  Pulses: Normal pulses  Heart sounds: Normal heart sounds  Pulmonary:      Effort: Pulmonary effort is normal  No respiratory distress  Breath sounds: No stridor  No wheezing, rhonchi or rales  Comments: Decreased breath sounds anteriorly and posteriorly  Chest:      Chest wall: No tenderness  Abdominal:      General: Bowel sounds are normal  There is no distension  Palpations: Abdomen is soft  There is no mass  Tenderness: There is no abdominal tenderness  There is no right CVA tenderness, left CVA tenderness, guarding or rebound  Hernia: No hernia is present  Musculoskeletal:         General: No swelling or deformity  Normal range of motion  Cervical back: Normal range of motion and neck supple  No rigidity or tenderness  Right lower leg: Edema present  Left lower leg: Edema present  Comments: +1 edema bilateral lower extremities, pitting   Lymphadenopathy:      Cervical: No cervical adenopathy  Skin:     General: Skin is warm and dry  Coloration: Skin is jaundiced  Findings: No bruising, erythema, lesion or rash  Neurological:      Mental Status: He is alert and oriented to person, place, and time  Mental status is at baseline  Psychiatric:         Behavior: Behavior normal          Thought Content:  Thought content normal          Judgment: Judgment normal  Bernadine Case, DO

## 2022-12-15 ENCOUNTER — DOCUMENTATION (OUTPATIENT)
Dept: CARDIOLOGY CLINIC | Facility: CLINIC | Age: 82
End: 2022-12-15

## 2022-12-15 ENCOUNTER — TELEPHONE (OUTPATIENT)
Dept: HEMATOLOGY ONCOLOGY | Facility: CLINIC | Age: 82
End: 2022-12-15

## 2022-12-15 DIAGNOSIS — D63.1 ANEMIA DUE TO STAGE 4 CHRONIC KIDNEY DISEASE (HCC): Primary | ICD-10-CM

## 2022-12-15 DIAGNOSIS — N18.4 ANEMIA DUE TO STAGE 4 CHRONIC KIDNEY DISEASE (HCC): Primary | ICD-10-CM

## 2022-12-15 NOTE — TELEPHONE ENCOUNTER
CALL RETURN FORM   Reason for patient call? Patient calling with questions regarding his infusion plan  He would like to know what each infusion appointment is for and when he will need to have his labs done    Patient's primary oncologist? Dr Agosto Res   Name of person the patient was calling for? Dr Amara Dockery team   Any additional information to add, if applicable? N/A   Informed patient that the message will be forwarded to the team and someone will get back to them as soon as possible    Did you relay this information to the patient?  Yes

## 2022-12-16 ENCOUNTER — HOSPITAL ENCOUNTER (OUTPATIENT)
Dept: RADIOLOGY | Facility: HOSPITAL | Age: 82
Discharge: HOME | End: 2022-12-16

## 2022-12-16 DIAGNOSIS — R06.02 SHORTNESS OF BREATH: ICD-10-CM

## 2022-12-19 ENCOUNTER — APPOINTMENT (OUTPATIENT)
Dept: LAB | Facility: CLINIC | Age: 82
End: 2022-12-19

## 2022-12-19 DIAGNOSIS — N18.4 STAGE 4 CHRONIC KIDNEY DISEASE (HCC): ICD-10-CM

## 2022-12-19 DIAGNOSIS — D64.9 ANEMIA, UNSPECIFIED TYPE: ICD-10-CM

## 2022-12-19 LAB
ANION GAP SERPL CALCULATED.3IONS-SCNC: 9 MMOL/L (ref 4–13)
BUN SERPL-MCNC: 48 MG/DL (ref 5–25)
CALCIUM SERPL-MCNC: 9 MG/DL (ref 8.3–10.1)
CHLORIDE SERPL-SCNC: 111 MMOL/L (ref 96–108)
CO2 SERPL-SCNC: 18 MMOL/L (ref 21–32)
CREAT SERPL-MCNC: 2.67 MG/DL (ref 0.6–1.3)
ERYTHROCYTE [DISTWIDTH] IN BLOOD BY AUTOMATED COUNT: 25.6 % (ref 11.6–15.1)
FERRITIN SERPL-MCNC: 423 NG/ML (ref 8–388)
GFR SERPL CREATININE-BSD FRML MDRD: 21 ML/MIN/1.73SQ M
GLUCOSE P FAST SERPL-MCNC: 179 MG/DL (ref 65–99)
HCT VFR BLD AUTO: 31.3 % (ref 36.5–49.3)
HGB BLD-MCNC: 9.4 G/DL (ref 12–17)
IRON SATN MFR SERPL: 17 % (ref 20–50)
IRON SERPL-MCNC: 54 UG/DL (ref 65–175)
MCH RBC QN AUTO: 33.7 PG (ref 26.8–34.3)
MCHC RBC AUTO-ENTMCNC: 30 G/DL (ref 31.4–37.4)
MCV RBC AUTO: 112 FL (ref 82–98)
PLATELET # BLD AUTO: 234 THOUSANDS/UL (ref 149–390)
PMV BLD AUTO: 11.6 FL (ref 8.9–12.7)
POTASSIUM SERPL-SCNC: 4.4 MMOL/L (ref 3.5–5.3)
RBC # BLD AUTO: 2.79 MILLION/UL (ref 3.88–5.62)
SODIUM SERPL-SCNC: 138 MMOL/L (ref 135–147)
TIBC SERPL-MCNC: 312 UG/DL (ref 250–450)
WBC # BLD AUTO: 9.61 THOUSAND/UL (ref 4.31–10.16)

## 2022-12-20 ENCOUNTER — OFFICE VISIT (OUTPATIENT)
Dept: INTERNAL MEDICINE CLINIC | Facility: CLINIC | Age: 82
End: 2022-12-20

## 2022-12-20 ENCOUNTER — TELEPHONE (OUTPATIENT)
Dept: OTHER | Facility: HOSPITAL | Age: 82
End: 2022-12-20

## 2022-12-20 VITALS
OXYGEN SATURATION: 95 % | HEIGHT: 69 IN | DIASTOLIC BLOOD PRESSURE: 75 MMHG | HEART RATE: 71 BPM | WEIGHT: 213 LBS | RESPIRATION RATE: 17 BRPM | TEMPERATURE: 98 F | BODY MASS INDEX: 31.55 KG/M2 | SYSTOLIC BLOOD PRESSURE: 138 MMHG

## 2022-12-20 DIAGNOSIS — R60.0 BILATERAL LOWER EXTREMITY EDEMA: Primary | ICD-10-CM

## 2022-12-20 DIAGNOSIS — I10 BENIGN ESSENTIAL HYPERTENSION: ICD-10-CM

## 2022-12-20 DIAGNOSIS — I42.9 CARDIOMYOPATHY, UNSPECIFIED TYPE (HCC): ICD-10-CM

## 2022-12-20 DIAGNOSIS — E87.70 FLUID OVERLOAD: ICD-10-CM

## 2022-12-20 DIAGNOSIS — R06.02 SHORTNESS OF BREATH: ICD-10-CM

## 2022-12-20 DIAGNOSIS — E87.20 METABOLIC ACIDOSIS: ICD-10-CM

## 2022-12-20 DIAGNOSIS — N17.9 AKI (ACUTE KIDNEY INJURY) (HCC): Primary | ICD-10-CM

## 2022-12-20 RX ORDER — TORSEMIDE 20 MG/1
20 TABLET ORAL 2 TIMES DAILY PRN
Qty: 60 TABLET | Refills: 3 | Status: SHIPPED | OUTPATIENT
Start: 2022-12-20

## 2022-12-20 RX ORDER — SODIUM BICARBONATE 650 MG/1
1300 TABLET ORAL 3 TIMES DAILY
Qty: 360 TABLET | Refills: 3 | Status: SHIPPED | OUTPATIENT
Start: 2022-12-20

## 2022-12-20 RX ORDER — METOLAZONE 5 MG/1
5 TABLET ORAL 3 TIMES WEEKLY
Qty: 36 TABLET | Refills: 3 | Status: SHIPPED | OUTPATIENT
Start: 2022-12-20

## 2022-12-20 NOTE — ASSESSMENT & PLAN NOTE
With the patient having some problems with shortness of breath and increased swelling to feet and ankles, weight gain difficulty with fatigue presumptive diagnosis was congestive heart failure  I did contact patient's cardiologist and also because of his chronic kidney disease his nephrologist and they did suggest placing the patient on diuretics  He was started on Demadex 80 mg initially then 20 mg twice a day  He has had no substantial diuresis and  Weight has actually elevated from 207 to 213  Still having significant shortness of breath with exertion and swelling to lower extremities  When checking his basic metabolic profile his renal function is stable  He was told that he needs to continue with present dosage of diuretics Demadex 20 mg twice a day  He does have a follow-up appointment to be seen by cardiology, hematology oncology, nephrology  He was told over the weekends if increasing shortness of breath and increasing and substantial weight gain to please call the doctor on-call or his specialist regarding this  He was given a prescription for compressive stockings to wear during the day to reduce some of his swelling to lower extremities  Patient also had a hemoglobin checked with recent lab testing showing stable hemoglobin but still having anemia

## 2022-12-20 NOTE — TELEPHONE ENCOUNTER
Renal function improving to cr 2 6  Bicarb level 18  Hb 9 4    Feet swelling    Weight has trended upward 213 pound, was around 183 pound during office visit in August    Plan:  Started metolazone 5 mg 3 times a week along with torsemide 40 mg daily  -bmp weekly for next two weeks   - once weight down to 190 lbs would decrease dose of metolazone  -increased sodium bicarb to tid

## 2022-12-20 NOTE — ASSESSMENT & PLAN NOTE
Patient does have a history of hypertension  Initially with presentation in the office his blood pressure was elevated  Patient once he was able to sit and relax did have some improvement in his blood pressure readings  We will continue present surveillance and make modification of treatment in the future  We feel elevated blood pressure readings are from fluid overload at this time

## 2022-12-20 NOTE — ASSESSMENT & PLAN NOTE
Patient is having considerable lower extremity edema  This is multifactorial the patient having history of ischemic cardiomyopathy, anemia and also consequence of recent treatment  Renal insufficiency    We did give the prescription to the patient's wife for him to be fitted for [facility] [employer] support hose which may be helpful to wearing the day and take off at nighttime may reduce some of the edema and swelling

## 2022-12-20 NOTE — PROGRESS NOTES
Name: Radha Joseph      : 1940      MRN: [id number]  Encounter Provider: Kristi Graves DO  Encounter Date: 2022   Encounter department: [facility]    Assessment & Plan     1  Bilateral lower extremity edema  Assessment & Plan:  Patient is having considerable lower extremity edema  This is multifactorial the patient having history of ischemic cardiomyopathy, anemia and also consequence of recent treatment  Renal insufficiency  We did give the prescription to the patient's wife for him to be fitted for [facility] support hose which may be helpful to wearing the day and take off at nighttime may reduce some of the edema and swelling    Orders:  -     TEDS Stockings  -     Basic metabolic panel; Future  -     CBC and differential; Future    2  Cardiomyopathy, unspecified type ([address] )  -     torsemide (DEMADEX) 20 mg tablet; Take 1 tablet (20 mg total) by mouth 2 (two) times a day as needed (Shortness of breath)  -     metoprolol tartrate (LOPRESSOR) 25 mg tablet; Take 1 tablet (25 mg total) by mouth every 12 (twelve) hours  -     Basic metabolic panel; Future  -     CBC and differential; Future    3  Shortness of breath  Assessment & Plan: With the patient having some problems with shortness of breath and increased swelling to feet and ankles, weight gain difficulty with fatigue presumptive diagnosis was congestive heart failure  I did contact patient's cardiologist and also because of his chronic kidney disease his nephrologist and they did suggest placing the patient on diuretics  He was started on Demadex 80 mg initially then 20 mg twice a day  He has had no substantial diuresis and  Weight has actually elevated from 207 to 213  Still having significant shortness of breath with exertion and swelling to lower extremities  When checking his basic metabolic profile his renal function is stable    He was told that he needs to continue with present dosage of diuretics Demadex 20 mg twice a day  He does have a follow-up appointment to be seen by cardiology, hematology oncology, nephrology  He was told over the weekends if increasing shortness of breath and increasing and substantial weight gain to please call the doctor on-call or his specialist regarding this  He was given a prescription for compressive stockings to wear during the day to reduce some of his swelling to lower extremities  Patient also had a hemoglobin checked with recent lab testing showing stable hemoglobin but still having anemia  4  Benign essential hypertension  Assessment & Plan:  Patient does have a history of hypertension  Initially with presentation in the office his blood pressure was elevated  Patient once he was able to sit and relax did have some improvement in his blood pressure readings  We will continue present surveillance and make modification of treatment in the future  We feel elevated blood pressure readings are from fluid overload at this time  Subjective     Patient is seen today for reevaluation  Was seen in the office approximately 1 week ago was having problems with increasing shortness of breath with exertion fluid overload  I did discuss this with the patient's nephrologist and cardiologist and they both agree that the patient most likely is in congestive heart failure suggested diuretics which were started  Patient has had no significant diuresis with him being on high dose of Demadex  States that his urine output was relatively the same and patient has gained weight instead of losing weight with this appointment  Still is having some shortness of breath with exertion and swelling to lower extremities  I suggested the patient continue with his diuretic dose  His renal function is stable with labs performed yesterday  Review of Systems   Constitutional: Positive for activity change, fatigue and unexpected weight change   Negative for appetite change, chills, diaphoresis and fever  Dyspneic with all activity, weight gain   HENT: Negative  Eyes: Negative  Respiratory: Positive for shortness of breath  Negative for apnea, cough, choking, chest tightness, wheezing and stridor  Cardiovascular: Positive for chest pain and leg swelling  Negative for palpitations  Gastrointestinal: Negative  Endocrine: Negative  Genitourinary: Negative  Musculoskeletal: Negative  Skin: Negative  Allergic/Immunologic: Negative  Neurological: Negative  Hematological: Negative  Psychiatric/Behavioral: Negative          Past Medical History:   Diagnosis Date   • Acute kidney injury ([facility] )    • Arthritis     Hands   • Wright esophagus    • BPH with obstruction/lower urinary tract symptoms    • CAD (coronary artery disease)     Last assessed 09/16/15   • Cancer [facility])     bladder   • Cataract, acquired     Last assessed 10/10/17   • Chronic kidney disease    • Coronary artery disease    • Diabetes mellitus ([place] [address] )     NIDDM   • Diabetic neuropathy (HCC)     Feet   • Enlarged prostate with lower urinary tract symptoms (LUTS)     Last assessed 10/10/17   • Erectile dysfunction    • GERD (gastroesophageal reflux disease)     Last assessed 10/10/17   • Hemoptysis     Last assessed 03/14/16   • Hypercholesterolemia     Last assessed 10/10/17   • Hypertension     Last assessed 10/10/17   • Macular degeneration     Right eye is particularly affected-peripheral vision intact   • Myocardial infarction [facility]) 1998   • OAB (overactive bladder)    • Testicular hypofunction    • Testicular hypogonadism     Last assessed 10/10/17   • Tinnitus    • Umbilical hernia     Last assessed 06/18/14   • Urge incontinence of urine    • Wears glasses      Past Surgical History:   Procedure Laterality Date   • COLONOSCOPY     • CORONARY ARTERY BYPASS GRAFT  07/16/2014    ABG x 4 LIMA to LAD,SVG to diagnoal 2 SVG to OM-1, SVG to PDA, resection of partial plerual mass   • CT GUIDED PERC DRAINAGE CATHETER PLACEMENT  2/19/2021   • CYSTOSCOPY  2013   • CYSTOSCOPY  02/08/2022    [place]   • ESOPHAGOGASTRODUODENOSCOPY     • FL RETROGRADE PYELOGRAM  12/20/2018   • FL RETROGRADE PYELOGRAM  12/5/2019   • INGUINAL HERNIA REPAIR Bilateral 2015   • IR DRAINAGE TUBE CHECK AND/OR REMOVAL  3/5/2021   • PHOTODYNAMIC THERAPY      For Barretts esophagus   • MO COLONOSCOPY FLX DX W/COLLJ SPEC WHEN PFRMD N/A 4/25/2016    Procedure: COLONOSCOPY;  Surgeon: Rosibel Tobias MD;  Location:  GI LAB; Service: Gastroenterology   • MO CYSTOURETHROSCOPY,BIOPSY N/A 9/10/2020    Procedure: CYSTOSCOPY, COLLECTION OF LEFT KIDNEY CYTOLOGY, BILATERAL RETROGRADE PYELOGRAM, BLADDER WALL BIOPSIES  AND [address], RANDOM BLADDER TUMOR BIOPSIES;  Surgeon: Coral Villavicencio MD;  Location: [address] [place] [address] MAIN OR;  Service: Urology   • MO CYSTOURETHROSCOPY,BIOPSY N/A 4/5/2022    Procedure: urethroscopy , biopsy of urethra with fulgeration;  Surgeon: Jerson Veronica MD;  Location:  MAIN OR;  Service: Urology   • MO CYSTOURETHROSCOPY,FULGUR 2-5 CM LESN N/A 4/16/2020    Procedure: CYSTOSCOPY, TRANSURETHRAL RESECTION OF BLADDER TUMOR (TURBT);   Surgeon: Coral Villavicencio MD;  Location[facility];  Service: Urology   • MO CYSTOURETHROSCOPY,FULGUR <0 5 CM LESN N/A 12/5/2019    Procedure: CYSTO W/TURBT AND TRANSURETHRAL PROSTATE BIOPSY AND OPENING OF BLADDER NECK CONTRACTURE, B/L Retrograde pyelogram;  Surgeon: Coral Villavicencio MD;  [facility];  Service: Urology   • TONSILLECTOMY     • TRANSURETHRAL RESECTION OF PROSTATE N/A 12/20/2018    Procedure: CYSTO, PHOTO SELECTIVE VAPORIZATION OF PROSTATE, B/L RETROGRADE PYELOGRAM, TURBT;  Surgeon: Coral Villavicencio MD;  Location: [facility];  Service: Urology   • UMBILICAL HERNIA REPAIR  2012   • UPPER GASTROINTESTINAL ENDOSCOPY       Family History   Problem Relation Age of Onset   • Cancer Mother    • Other Mother         Digestive System Complications   • Diabetes Father    • Heart disease Father    • Hypertension Father    • Coronary artery disease Father    • Hyperlipidemia Father      Social History     Socioeconomic History   • Marital status: /Civil Union     Spouse name: None   • Number of children: None   • Years of education: None   • Highest education level: None   Occupational History   • Occupation: Sales position   Tobacco Use   • Smoking status: Former     Packs/day: 1 00     Years: 13 00     Pack years: 13 00     Types: Cigarettes     Quit date:      Years since quittin 0   • Smokeless tobacco: Never   Vaping Use   • Vaping Use: Never used   Substance and Sexual Activity   • Alcohol use: Not Currently     Alcohol/week: 2 0 standard drinks     Types: 1 Glasses of wine, 1 Cans of beer per week   • Drug use: Never   • Sexual activity: Not Currently   Other Topics Concern   • None   Social History Narrative    Always uses seatbelt        Caffeine use- Drinks 2 cups of coffee daily     Social Determinants of Health     Financial Resource Strain: Low Risk    • Difficulty of Paying Living Expenses: Not very hard   Food Insecurity: Not on file   Transportation Needs: No Transportation Needs   • Lack of Transportation (Medical): No   • Lack of Transportation (Non-Medical):  No   Physical Activity: Not on file   Stress: Not on file   Social Connections: Not on file   Intimate Partner Violence: Not on file   Housing Stability: Not on file     Current Outpatient Medications on File Prior to Visit   Medication Sig   • atorvastatin (LIPITOR) 40 mg tablet TAKE ONE TABLET BY MOUTH AT BEDTIME   • Blood Glucose Monitoring Suppl (OneTouch Verio Flex System) w/Device KIT USE TO TEST BLOOD GLUCOSE DAILY   • Cholecalciferol (VITAMIN D) 50 MCG (2000 UT) tablet Take 2,000 Units by mouth daily   • citalopram (CeleXA) 20 mg tablet TAKE ONE TABLET BY MOUTH EVERY DAY   • Farxiga 5 MG TABS TAKE ONE TABLET BY MOUTH EVERY DAY   • ferrous sulfate 324 (65 Fe) mg Take 1 tablet (324 mg total) by mouth daily • Lancets (OneTouch Delica Plus [id number]) MISC TEST BLOOD GLUCOSE ONCE DAILY   • losartan (COZAAR) 25 mg tablet Take 1 tablet (25 mg total) by mouth daily   • magnesium oxide (MAG-OX) 400 mg Take 1 tablet (400 mg total) by mouth in the morning  • Multiple Vitamins-Minerals (OCUVITE-LUTEIN PO) Take 1 tablet by mouth 2 (two) times a day Pt is taking preservision    • nystatin (MYCOSTATIN) powder Apply topically 3 (three) times a day (Patient taking differently: Apply topically as needed)   • omeprazole (PriLOSEC) 40 MG capsule Take 1 capsule (40 mg total) by mouth in the morning  • ondansetron (ZOFRAN) 4 mg tablet Take 1 tablet (4 mg total) by mouth every 8 (eight) hours as needed for nausea or vomiting   • OneTouch Verio test strip TEST ONCE A DAY   • sodium bicarbonate 650 mg tablet Take 2 tablets (1,300 mg total) by mouth 3 (three) times a day   • torsemide (DEMADEX) 20 mg tablet Take 1 tablet (20 mg total) by mouth daily   • traMADol (ULTRAM) 50 mg tablet TAKE ONE TABLET BY MOUTH EVERY 6 HOURS AS NEEDED FOR MODERATE PAIN   • vitamin E, tocopherol, 400 units capsule Take 400 Units by mouth daily   • [DISCONTINUED] metoprolol tartrate (LOPRESSOR) 50 mg tablet TAKE ONE TABLET BY MOUTH TWICE A DAY     Allergies   Allergen Reactions   • Fentanyl Hallucinations   • Morphine And Related Other (See Comments)     While having an MI patient received morphine and had adverse reaction but doesn't know what happened       Immunization History   Administered Date(s) Administered   • COVID-19 MODERNA VACC 0 5 ML IM 01/18/2021, 02/15/2021   • Influenza Split High Dose Preservative Free IM 10/10/2013, 09/22/2014, 10/11/2016   • Influenza, high dose seasonal 0 7 mL 01/22/2019, 10/08/2019, 09/03/2020, 11/23/2021, 11/29/2022   • Pneumococcal Conjugate 13-Valent 05/23/2019   • Pneumococcal Polysaccharide PPV23 03/11/2014       Objective     /75 (BP Location: Left arm, Patient Position: Sitting, Cuff Size: Adult)   Pulse 71   Temp 98 °F (36 7 °C) (Tympanic)   Resp 17   Ht 5' 9" (1 753 m)   Wt 96 6 kg (213 lb)   SpO2 95%   BMI 31 45 kg/m²     Physical Exam  Vitals and nursing note reviewed  Constitutional:       General: He is not in acute distress  Appearance: He is obese  He is ill-appearing  He is not toxic-appearing or diaphoretic  Comments: Pleasant obese fatigued 80-year-old male who is awake alert  Accompanied by his wife  Yellow tint to the skin, cheerful affect   HENT:      Head: Normocephalic and atraumatic  Right Ear: Tympanic membrane normal       Left Ear: Tympanic membrane normal       Nose: Nose normal  No congestion or rhinorrhea  Mouth/Throat:      Mouth: Mucous membranes are moist       Pharynx: Oropharynx is clear  No oropharyngeal exudate or posterior oropharyngeal erythema  Eyes:      General: No scleral icterus  Right eye: No discharge  Left eye: No discharge  Extraocular Movements: Extraocular movements intact  Conjunctiva/sclera: Conjunctivae normal       Pupils: Pupils are equal, round, and reactive to light  Neck:      Vascular: No carotid bruit  Cardiovascular:      Rate and Rhythm: Normal rate and regular rhythm  Heart sounds: Normal heart sounds  Pulmonary:      Effort: Pulmonary effort is normal  No respiratory distress  Breath sounds: No stridor  No wheezing, rhonchi or rales  Comments: Decreased breath sounds anteriorly and posteriorly, no rales rhonchi or wheezes could be appreciated today on exam  Chest:      Chest wall: No tenderness  Abdominal:      General: Bowel sounds are normal  There is no distension  Palpations: Abdomen is soft  There is no mass  Tenderness: There is no abdominal tenderness  There is no right CVA tenderness, left CVA tenderness, guarding or rebound  Hernia: No hernia is present  Musculoskeletal:         General: No swelling, tenderness, deformity or signs of injury   Normal range of motion  Cervical back: Normal range of motion and neck supple  No rigidity or tenderness  Right lower leg: Edema present  Left lower leg: Edema present  Comments: +1 edema bilateral lower extremities, pitting   Lymphadenopathy:      Cervical: No cervical adenopathy  Skin:     General: Skin is warm and dry  Coloration: Skin is jaundiced  Findings: No bruising, erythema, lesion or rash  Neurological:      Mental Status: He is alert and oriented to person, place, and time  Mental status is at baseline  Motor: Weakness present  Comments: Some diffuse weakness upper lower extremities, absent patellar and Achilles tendon reflexes   Psychiatric:         Behavior: Behavior normal          Thought Content:  Thought content normal          Judgment: Judgment normal        [place], DO

## 2022-12-27 ENCOUNTER — LAB (OUTPATIENT)
Dept: LAB | Facility: CLINIC | Age: 82
End: 2022-12-27

## 2022-12-27 DIAGNOSIS — D63.1 ANEMIA DUE TO STAGE 4 CHRONIC KIDNEY DISEASE (HCC): ICD-10-CM

## 2022-12-27 DIAGNOSIS — D49.4 BLADDER TUMOR: ICD-10-CM

## 2022-12-27 DIAGNOSIS — R60.0 BILATERAL LOWER EXTREMITY EDEMA: ICD-10-CM

## 2022-12-27 DIAGNOSIS — I10 ESSENTIAL HYPERTENSION, BENIGN: ICD-10-CM

## 2022-12-27 DIAGNOSIS — N18.4 ANEMIA DUE TO STAGE 4 CHRONIC KIDNEY DISEASE (HCC): ICD-10-CM

## 2022-12-27 DIAGNOSIS — C67.8 MALIGNANT NEOPLASM OF OVERLAPPING SITES OF BLADDER (HCC): ICD-10-CM

## 2022-12-27 DIAGNOSIS — R82.89 POSITIVE URINARY CYTOLOGY: ICD-10-CM

## 2022-12-27 DIAGNOSIS — I42.9 CARDIOMYOPATHY, UNSPECIFIED TYPE (HCC): ICD-10-CM

## 2022-12-27 DIAGNOSIS — E87.20 METABOLIC ACIDOSIS: ICD-10-CM

## 2022-12-27 DIAGNOSIS — C67.5 MALIGNANT NEOPLASM OF URINARY BLADDER NECK (HCC): ICD-10-CM

## 2022-12-27 DIAGNOSIS — N17.9 AKI (ACUTE KIDNEY INJURY) (HCC): ICD-10-CM

## 2022-12-27 LAB
ALBUMIN SERPL BCP-MCNC: 3.2 G/DL (ref 3.5–5)
ALP SERPL-CCNC: 90 U/L (ref 46–116)
ALT SERPL W P-5'-P-CCNC: 22 U/L (ref 12–78)
ANION GAP SERPL CALCULATED.3IONS-SCNC: 5 MMOL/L (ref 4–13)
AST SERPL W P-5'-P-CCNC: 20 U/L (ref 5–45)
BASOPHILS # BLD AUTO: 0.05 THOUSANDS/ÂΜL (ref 0–0.1)
BASOPHILS NFR BLD AUTO: 1 % (ref 0–1)
BILIRUB SERPL-MCNC: 0.64 MG/DL (ref 0.2–1)
BUN SERPL-MCNC: 48 MG/DL (ref 5–25)
CALCIUM ALBUM COR SERPL-MCNC: 9.6 MG/DL (ref 8.3–10.1)
CALCIUM SERPL-MCNC: 9 MG/DL (ref 8.3–10.1)
CHLORIDE SERPL-SCNC: 105 MMOL/L (ref 96–108)
CO2 SERPL-SCNC: 28 MMOL/L (ref 21–32)
CREAT SERPL-MCNC: 2.61 MG/DL (ref 0.6–1.3)
EOSINOPHIL # BLD AUTO: 0.17 THOUSAND/ÂΜL (ref 0–0.61)
EOSINOPHIL NFR BLD AUTO: 2 % (ref 0–6)
ERYTHROCYTE [DISTWIDTH] IN BLOOD BY AUTOMATED COUNT: 20 % (ref 11.6–15.1)
GFR SERPL CREATININE-BSD FRML MDRD: 21 ML/MIN/1.73SQ M
GLUCOSE P FAST SERPL-MCNC: 142 MG/DL (ref 65–99)
HCT VFR BLD AUTO: 33.9 % (ref 36.5–49.3)
HGB BLD-MCNC: 10.4 G/DL (ref 12–17)
IMM GRANULOCYTES # BLD AUTO: 0.04 THOUSAND/UL (ref 0–0.2)
IMM GRANULOCYTES NFR BLD AUTO: 1 % (ref 0–2)
LYMPHOCYTES # BLD AUTO: 1.26 THOUSANDS/ÂΜL (ref 0.6–4.47)
LYMPHOCYTES NFR BLD AUTO: 16 % (ref 14–44)
MCH RBC QN AUTO: 33 PG (ref 26.8–34.3)
MCHC RBC AUTO-ENTMCNC: 30.7 G/DL (ref 31.4–37.4)
MCV RBC AUTO: 108 FL (ref 82–98)
MONOCYTES # BLD AUTO: 1.25 THOUSAND/ÂΜL (ref 0.17–1.22)
MONOCYTES NFR BLD AUTO: 16 % (ref 4–12)
NEUTROPHILS # BLD AUTO: 5.05 THOUSANDS/ÂΜL (ref 1.85–7.62)
NEUTS SEG NFR BLD AUTO: 64 % (ref 43–75)
NRBC BLD AUTO-RTO: 0 /100 WBCS
PLATELET # BLD AUTO: 194 THOUSANDS/UL (ref 149–390)
PMV BLD AUTO: 11.4 FL (ref 8.9–12.7)
POTASSIUM SERPL-SCNC: 3.8 MMOL/L (ref 3.5–5.3)
PROT SERPL-MCNC: 7.1 G/DL (ref 6.4–8.4)
RBC # BLD AUTO: 3.15 MILLION/UL (ref 3.88–5.62)
SODIUM SERPL-SCNC: 138 MMOL/L (ref 135–147)
WBC # BLD AUTO: 7.82 THOUSAND/UL (ref 4.31–10.16)

## 2022-12-28 ENCOUNTER — TELEPHONE (OUTPATIENT)
Dept: INTERNAL MEDICINE CLINIC | Facility: CLINIC | Age: 82
End: 2022-12-28

## 2022-12-28 ENCOUNTER — TELEPHONE (OUTPATIENT)
Dept: NEPHROLOGY | Facility: CLINIC | Age: 82
End: 2022-12-28

## 2022-12-28 DIAGNOSIS — I10 BENIGN ESSENTIAL HYPERTENSION: Primary | ICD-10-CM

## 2022-12-28 RX ORDER — LOSARTAN POTASSIUM 50 MG/1
50 TABLET ORAL DAILY
Qty: 30 TABLET | Refills: 5 | Status: SHIPPED | OUTPATIENT
Start: 2022-12-28

## 2022-12-28 NOTE — RESULT ENCOUNTER NOTE
Please call the patient regarding his abnormal result  Cr stable at 2 6 and electrolytes stable  Please ask if edema and SOB improving  Also can you move his appointment with me on Jan 5th if patient can come that day   Thank you

## 2022-12-28 NOTE — TELEPHONE ENCOUNTER
I spoke to Joleen Muñoz he is having some slight swelling still but improving with diuretics  His Sob is partially  improved   He does not travel to [facility], he will keep jan 12 appt with Abiodun Rodriguez

## 2022-12-28 NOTE — TELEPHONE ENCOUNTER
----- Message from Grazyna Alcocer MD sent at 12/28/2022 12:02 PM EST -----  Please call the patient regarding his abnormal result  Cr stable at 2 6 and electrolytes stable  Please ask if edema and SOB improving  Also can you move his appointment with me on Jan 5th if patient can come that day   Thank you

## 2022-12-28 NOTE — TELEPHONE ENCOUNTER
Pt called stating that he was told by Dr Raquel Villalpando to starting taking 2 pills of his Losartan daily rather than 1 pill daily  Pt stated the provider felt that would be more adequate for him  Pt wanted to confirm if you were ok with this  If so, pt will need a new script with new directions sent to the pharmacy  [facility]

## 2022-12-29 ENCOUNTER — HOSPITAL ENCOUNTER (OUTPATIENT)
Dept: INFUSION CENTER | Facility: HOSPITAL | Age: 82
Discharge: HOME | End: 2022-12-29
Attending: INTERNAL MEDICINE

## 2022-12-29 VITALS
TEMPERATURE: 97.3 F | SYSTOLIC BLOOD PRESSURE: 142 MMHG | DIASTOLIC BLOOD PRESSURE: 62 MMHG | RESPIRATION RATE: 18 BRPM | HEART RATE: 72 BPM

## 2022-12-29 DIAGNOSIS — C67.8 MALIGNANT NEOPLASM OF OVERLAPPING SITES OF BLADDER (HCC): ICD-10-CM

## 2022-12-29 DIAGNOSIS — R82.89 POSITIVE URINARY CYTOLOGY: ICD-10-CM

## 2022-12-29 DIAGNOSIS — D49.4 BLADDER TUMOR: ICD-10-CM

## 2022-12-29 DIAGNOSIS — N18.4 ANEMIA DUE TO STAGE 4 CHRONIC KIDNEY DISEASE (HCC): ICD-10-CM

## 2022-12-29 DIAGNOSIS — D63.1 ANEMIA DUE TO STAGE 4 CHRONIC KIDNEY DISEASE (HCC): ICD-10-CM

## 2022-12-29 DIAGNOSIS — C67.5 MALIGNANT NEOPLASM OF URINARY BLADDER NECK (HCC): Primary | ICD-10-CM

## 2022-12-29 RX ADMIN — DARBEPOETIN ALFA 100 MCG: 100 INJECTION, SOLUTION INTRAVENOUS; SUBCUTANEOUS at 09:52

## 2022-12-29 NOTE — PROGRESS NOTES
Patient tolerated aranesp to L arm without incident  Pt has future appts scheduled  Per Kellee Colindres RN, keep scheduled appts and office will see him on 1/4/23 to discuss plan  AVS provided

## 2022-12-29 NOTE — PLAN OF CARE
Problem: Potential for Falls  Goal: Patient will remain free of falls  Description: INTERVENTIONS:  - Educate patient/family on patient safety including physical limitations  - Instruct patient to call for assistance with activity   - Consult OT/PT to assist with strengthening/mobility   - Keep Call bell within reach  - Keep bed low and locked with side rails adjusted as appropriate  - Keep care items and personal belongings within reach  - Initiate and maintain comfort rounds  - Consider moving patient to room near nurses station  Outcome: Progressing

## 2022-12-30 ENCOUNTER — FOLLOW UP (OUTPATIENT)
Dept: URBAN - METROPOLITAN AREA CLINIC 6 | Facility: CLINIC | Age: 82
End: 2022-12-30

## 2022-12-30 ENCOUNTER — TELEPHONE (OUTPATIENT)
Dept: UROLOGY | Facility: AMBULATORY SURGERY CENTER | Age: 82
End: 2022-12-30

## 2022-12-30 ENCOUNTER — OFFICE VISIT (OUTPATIENT)
Dept: UROLOGY | Facility: AMBULATORY SURGERY CENTER | Age: 82
End: 2022-12-30

## 2022-12-30 VITALS
WEIGHT: 184 LBS | HEART RATE: 57 BPM | OXYGEN SATURATION: 97 % | SYSTOLIC BLOOD PRESSURE: 106 MMHG | DIASTOLIC BLOOD PRESSURE: 58 MMHG | BODY MASS INDEX: 27.17 KG/M2

## 2022-12-30 DIAGNOSIS — H35.3124: ICD-10-CM

## 2022-12-30 DIAGNOSIS — D49.59 URETHRAL TUMOR: Primary | ICD-10-CM

## 2022-12-30 DIAGNOSIS — E11.3293: ICD-10-CM

## 2022-12-30 DIAGNOSIS — H35.3211: ICD-10-CM

## 2022-12-30 PROCEDURE — 92014 COMPRE OPH EXAM EST PT 1/>: CPT

## 2022-12-30 PROCEDURE — 92250 FUNDUS PHOTOGRAPHY W/I&R: CPT

## 2022-12-30 PROCEDURE — 92134 CPTRZ OPH DX IMG PST SGM RTA: CPT

## 2022-12-30 ASSESSMENT — TONOMETRY
OS_IOP_MMHG: 8
OD_IOP_MMHG: 8

## 2022-12-30 ASSESSMENT — VISUAL ACUITY
OU_CC: 20/30-2
OS_CC: 20/70

## 2022-12-30 NOTE — LETTER
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, status post radical cystoprostatectomy with pelvic lymph node dissection and ileal conduit creation, urethral recurrence, stage IV disease on platinum based chemotherapy      Discussion  At this time I will defer management to Dr Vira Montalvo from medical oncology  He continues on carboplatin and gemcitabine  His most recent CT scan shows no evidence of disease, however, this was limited without IV contrast   Dr Vira Montalvo is recommending holding chemotherapy at this time until his blood counts rise  I recommend performing cystoscopy in the office  If he has recurrent urethral tumor the neck step would be fulguration in the operating room as again he has been deemed a poor candidate for urethrectomy by his primary urologist at the [address] [place] [address], Dr Sharla Merlin  History of Present Illness  80 y o  male with a history of 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  He underwent radical cystoprostatectomy with pelvic lymph node dissection and ileal conduit creation at the [address] [place] [address] performed in 2020  On recent surveillance cystoscopy I found carpeting of his urethra concerning for disease recurrence    A urethral biopsy was performed in the operating room and 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  He was referred back to the [facility]  CT scan from the fall 2022 showed multiple suspicious enlarged lymph nodes along the iliac chain  He has been receiving gemcitabine and carboplatinum  He had previously been on Keytruda  He was referred back to the [facility] and deemed to be a nonoperative candidate as there was concern for stage IV disease  I reviewed his recent CT scan from November 2022 which was performed without contrast, however, this shows no evidence of stage IV disease or lymphadenopathy  The scan is obviously limited without IV contrast       AUA Symptom Score      Review of Systems  Review of Systems   Constitutional: Negative  HENT: Negative  Eyes: Negative  Respiratory: Negative  Cardiovascular: Negative  Gastrointestinal: Negative  Endocrine: Negative  Genitourinary:        Per HPI   Musculoskeletal: Negative  Skin: Negative  Allergic/Immunologic: Negative  Neurological: Negative  Hematological: Negative  Psychiatric/Behavioral: Negative            Past Medical History  Past Medical History:   Diagnosis Date   • Acute kidney injury ([facility] )    • Arthritis     Hands   • Wright esophagus    • BPH with obstruction/lower urinary tract symptoms    • CAD (coronary artery disease)     Last assessed 09/16/15   • Cancer [facility])     bladder   • Cataract, acquired     Last assessed 10/10/17   • Chronic kidney disease    • Coronary artery disease    • Diabetes mellitus ([facility] )     NIDDM   • Diabetic neuropathy (HCC)     Feet   • Enlarged prostate with lower urinary tract symptoms (LUTS)     Last assessed 10/10/17   • Erectile dysfunction    • GERD (gastroesophageal reflux disease)     Last assessed 10/10/17   • Hemoptysis     Last assessed 03/14/16   • Hypercholesterolemia     Last assessed 10/10/17   • Hypertension     Last assessed 10/10/17   • Macular degeneration     Right eye is particularly affected-peripheral vision intact   • Myocardial infarction [facility]) 1998   • OAB (overactive bladder)    • Testicular hypofunction    • Testicular hypogonadism     Last assessed 10/10/17   • Tinnitus    • Umbilical hernia     Last assessed 06/18/14   • Urge incontinence of urine    • Wears glasses        Past Social History  Past Surgical History:   Procedure Laterality Date   • COLONOSCOPY     • CORONARY ARTERY BYPASS GRAFT  07/16/2014    ABG x 4 LIMA to LAD,SVG to diagnoal 2 SVG to OM-1, SVG to PDA, resection of partial plerual mass   • CT GUIDED [facility] DRAINAGE CATHETER PLACEMENT  2/19/2021   • CYSTOSCOPY  2013   • CYSTOSCOPY  02/08/2022    [facility]   • ESOPHAGOGASTRODUODENOSCOPY     • FL RETROGRADE PYELOGRAM  12/20/2018   • FL RETROGRADE PYELOGRAM  12/5/2019   • INGUINAL HERNIA REPAIR Bilateral 2015   • IR DRAINAGE TUBE CHECK AND/OR REMOVAL  3/5/2021   • PHOTODYNAMIC THERAPY      For Barretts esophagus   • OH COLONOSCOPY FLX DX W/COLLJ SPEC WHEN PFRMD N/A 4/25/2016    Procedure: COLONOSCOPY;  Surgeon: Latoya Driscoll MD;  Location:  GI LAB; Service: Gastroenterology   • OH CYSTO W/REMOVAL OF LESIONS SMALL N/A 12/5/2019    Procedure: CYSTO W/TURBT AND TRANSURETHRAL PROSTATE BIOPSY AND OPENING OF BLADDER NECK CONTRACTURE, B/L Retrograde pyelogram;  Surgeon: Linda Meraz MD;  Location: [facility] OR;  Service: Urology   • OH CYSTOURETHROSCOPY W/DEST &/RMVL MED BLADDER TY N/A 4/16/2020    Procedure: CYSTOSCOPY, TRANSURETHRAL RESECTION OF BLADDER TUMOR (TURBT);   Surgeon: Linda Meraz MD;  [facility];  Service: Urology   • OH CYSTOURETHROSCOPY WITH BIOPSY N/A 9/10/2020    Procedure: CYSTOSCOPY, COLLECTION OF LEFT KIDNEY CYTOLOGY, BILATERAL RETROGRADE PYELOGRAM, BLADDER WALL BIOPSIES  AND FULGERAION, RANDOM BLADDER TUMOR BIOPSIES;  Surgeon: Linda Meraz MD;  Location: [address] [place] [address];  Service: Urology   • OH CYSTOURETHROSCOPY WITH BIOPSY N/A 4/5/2022    Procedure: urethroscopy , biopsy of urethra with fulgeration;  Surgeon: Celestine Reina MD;  Location:  MAIN OR;  Service: Urology   • TONSILLECTOMY     • TRANSURETHRAL RESECTION OF PROSTATE N/A 2018    Procedure: CYSTO, PHOTO SELECTIVE VAPORIZATION OF PROSTATE, B/L RETROGRADE PYELOGRAM, TURBT;  Surgeon: Elaine Rogers MD;  [facility];  Service: Urology   • UMBILICAL HERNIA REPAIR     • UPPER GASTROINTESTINAL ENDOSCOPY         Past Family History  Family History   Problem Relation Age of Onset   • Cancer Mother    • Other Mother         Digestive System Complications   • Diabetes Father    • Heart disease Father    • Hypertension Father    • Coronary artery disease Father    • Hyperlipidemia Father        Past Social history  Social History     Socioeconomic History   • Marital status: /Civil Union     Spouse name: Not on file   • Number of children: Not on file   • Years of education: Not on file   • Highest education level: Not on file   Occupational History   • Occupation: Sales position   Tobacco Use   • Smoking status: Former     Packs/day: 1 00     Years: 13 00     Pack years: 13 00     Types: Cigarettes     Quit date:      Years since quittin 0   • Smokeless tobacco: Never   Vaping Use   • Vaping Use: Never used   Substance and Sexual Activity   • Alcohol use: Not Currently     Alcohol/week: 2 0 standard drinks     Types: 1 Glasses of wine, 1 Cans of beer per week   • Drug use: Never   • Sexual activity: Not Currently   Other Topics Concern   • Not on file   Social History Narrative    Always uses seatbelt        Caffeine use- Drinks 2 cups of coffee daily     Social Determinants of Health     Financial Resource Strain: Low Risk    • Difficulty of Paying Living Expenses: Not very hard   Food Insecurity: Not on file   Transportation Needs: No Transportation Needs   • Lack of Transportation (Medical): No   • Lack of Transportation (Non-Medical):  No   Physical Activity: Not on file   Stress: Not on file   Social Connections: Not on file   Intimate Partner Violence: Not on file   Housing Stability: Not on file       Current Medications  Current Outpatient Medications   Medication Sig Dispense Refill   • atorvastatin (LIPITOR) 40 mg tablet TAKE ONE TABLET BY MOUTH AT BEDTIME 90 tablet 3   • Blood Glucose Monitoring Suppl (OneTouch Verio Flex System) w/Device KIT USE TO TEST BLOOD GLUCOSE DAILY 1 kit 0   • Cholecalciferol (VITAMIN D) 50 MCG (2000 UT) tablet Take 2,000 Units by mouth daily     • citalopram (CeleXA) 20 mg tablet TAKE ONE TABLET BY MOUTH EVERY DAY 30 tablet 3   • Farxiga 5 MG TABS TAKE ONE TABLET BY MOUTH EVERY DAY 90 tablet 2   • ferrous sulfate 324 (65 Fe) mg Take 1 tablet (324 mg total) by mouth daily 30 tablet 5   • Lancets (OneTouch Delica Plus [id number]) MISC TEST BLOOD GLUCOSE ONCE DAILY 100 each 0   • losartan (Cozaar) 50 mg tablet Take 1 tablet (50 mg total) by mouth daily 30 tablet 5   • magnesium oxide (MAG-OX) 400 mg Take 1 tablet (400 mg total) by mouth in the morning  180 tablet 2   • metolazone (ZAROXOLYN) 5 mg tablet Take 1 tablet (5 mg total) by mouth 3 (three) times a week 36 tablet 3   • metoprolol tartrate (LOPRESSOR) 25 mg tablet Take 1 tablet (25 mg total) by mouth every 12 (twelve) hours 180 tablet 3   • Multiple Vitamins-Minerals (OCUVITE-LUTEIN PO) Take 1 tablet by mouth 2 (two) times a day Pt is taking preservision      • nystatin (MYCOSTATIN) powder Apply topically 3 (three) times a day (Patient taking differently: Apply topically as needed) 30 g 3   • omeprazole (PriLOSEC) 40 MG capsule Take 1 capsule (40 mg total) by mouth in the morning   90 capsule 3   • OneTouch Verio test strip TEST ONCE A  strip 0   • sodium bicarbonate 650 mg tablet Take 2 tablets (1,300 mg total) by mouth 3 (three) times a day 360 tablet 3   • torsemide (DEMADEX) 20 mg tablet Take 1 tablet (20 mg total) by mouth 2 (two) times a day as needed (Shortness of breath) 60 tablet 3   • traMADol (ULTRAM) 50 mg tablet TAKE ONE TABLET BY MOUTH EVERY 6 HOURS AS NEEDED FOR MODERATE PAIN 20 tablet 0   • vitamin E, tocopherol, 400 units capsule Take 400 Units by mouth daily     • ondansetron (ZOFRAN) 4 mg tablet Take 1 tablet (4 mg total) by mouth every 8 (eight) hours as needed for nausea or vomiting (Patient not taking: Reported on 12/30/2022) 20 tablet 1   • torsemide (DEMADEX) 20 mg tablet Take 1 tablet (20 mg total) by mouth daily (Patient not taking: Reported on 12/30/2022) 20 tablet 0     No current facility-administered medications for this visit  Allergies  Allergies   Allergen Reactions   • Fentanyl Hallucinations   • Morphine And Related Other (See Comments)     While having an MI patient received morphine and had adverse reaction but doesn't know what happened  Past Medical History, Social History, Family History, medications and allergies were reviewed  Vitals  Vitals:    12/30/22 1132   BP: 106/58   BP Location: Left arm   Patient Position: Sitting   Cuff Size: Adult   Pulse: 57   SpO2: 97%   Weight: 83 5 kg (184 lb)       Physical Exam  Physical Exam  On examination he is in no acute distress  His abdomen is soft nontender nondistended  Ileal conduit is in place  It is pink and viable  Copious clear yellow urine is noted within the bag  Gait is slow with the assistance of a cane      Results  Lab Results   Component Value Date    PSA 0 4 09/19/2018    PSA 0 4 10/04/2017    PSA 0 4 10/17/2016     Lab Results   Component Value Date    GLUCOSE 128 08/13/2015    CALCIUM 9 0 12/27/2022     08/13/2015    K 3 8 12/27/2022    CO2 28 12/27/2022     12/27/2022    BUN 48 (H) 12/27/2022    CREATININE 2 61 (H) 12/27/2022     Lab Results   Component Value Date    WBC 7 82 12/27/2022    HGB 10 4 (L) 12/27/2022    HCT 33 9 (L) 12/27/2022     (H) 12/27/2022     12/27/2022         Office Urine Dip  No results found for this or any previous visit (from the past 1 hour(s)) ]

## 2022-12-30 NOTE — TELEPHONE ENCOUNTER
He would like to see the patient in 2 wks  Dr Neto Yancey informed me even if he is on Administrative time this is ok  The patient will schedule his Cysto at this time, his wife did not have her calendar with her

## 2022-12-30 NOTE — PROGRESS NOTES
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, status post radical cystoprostatectomy with pelvic lymph node dissection and ileal conduit creation, urethral recurrence, stage IV disease on platinum based chemotherapy      Discussion  At this time I will defer management to Dr Devin Hernandez from medical oncology  He continues on carboplatin and gemcitabine  His most recent CT scan shows no evidence of disease, however, this was limited without IV contrast   Dr Devin Hernandez is recommending holding chemotherapy at this time until his blood counts rise  I recommend performing cystoscopy in the office  If he has recurrent urethral tumor the neck step would be fulguration in the operating room as again he has been deemed a poor candidate for urethrectomy by his primary urologist at the [address] [place] [address], Dr Yolanda Novak  History of Present Illness  80 y o  male with a history of 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  He underwent radical cystoprostatectomy with pelvic lymph node dissection and ileal conduit creation at the [address] [place] [address] performed in 2020  On recent surveillance cystoscopy I found carpeting of his urethra concerning for disease recurrence  A urethral biopsy was performed in the operating room and 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  He was referred back to the [address] [place] [address]  CT scan from the fall 2022 showed multiple suspicious enlarged lymph nodes along the iliac chain  He has been receiving gemcitabine and carboplatinum  He had previously been on Keytruda  He was referred back to the [address] [place] [address] and deemed to be a nonoperative candidate as there was concern for stage IV disease    I reviewed his recent CT scan from November 2022 which was performed without contrast, however, this shows no evidence of stage IV disease or lymphadenopathy  The scan is obviously limited without IV contrast       AUA Symptom Score      Review of Systems  Review of Systems   Constitutional: Negative  HENT: Negative  Eyes: Negative  Respiratory: Negative  Cardiovascular: Negative  Gastrointestinal: Negative  Endocrine: Negative  Genitourinary:        Per HPI   Musculoskeletal: Negative  Skin: Negative  Allergic/Immunologic: Negative  Neurological: Negative  Hematological: Negative  Psychiatric/Behavioral: Negative            Past Medical History  Past Medical History:   Diagnosis Date   • Acute kidney injury ([facility] [address] )    • Arthritis     Hands   • Wright esophagus    • BPH with obstruction/lower urinary tract symptoms    • CAD (coronary artery disease)     Last assessed 09/16/15   • Cancer ([facility] )     bladder   • Cataract, acquired     Last assessed 10/10/17   • Chronic kidney disease    • Coronary artery disease    • Diabetes mellitus ([facility] )     NIDDM   • Diabetic neuropathy (HCC)     Feet   • Enlarged prostate with lower urinary tract symptoms (LUTS)     Last assessed 10/10/17   • Erectile dysfunction    • GERD (gastroesophageal reflux disease)     Last assessed 10/10/17   • Hemoptysis     Last assessed 03/14/16   • Hypercholesterolemia     Last assessed 10/10/17   • Hypertension     Last assessed 10/10/17   • Macular degeneration     Right eye is particularly affected-peripheral vision intact   • Myocardial infarction [facility]) 1998   • OAB (overactive bladder)    • Testicular hypofunction    • Testicular hypogonadism     Last assessed 10/10/17   • Tinnitus    • Umbilical hernia     Last assessed 06/18/14   • Urge incontinence of urine    • Wears glasses        Past Social History  Past Surgical History:   Procedure Laterality Date   • COLONOSCOPY     • CORONARY ARTERY BYPASS GRAFT  07/16/2014    ABG x 4 LIMA to LAD,SVG to diagnoal 2 SVG to OM-1, SVG to PDA, resection of partial plerual mass   • CT GUIDED [facility] DRAINAGE CATHETER PLACEMENT  2/19/2021   • CYSTOSCOPY  2013   • CYSTOSCOPY  02/08/2022    [facility]   • ESOPHAGOGASTRODUODENOSCOPY     • FL RETROGRADE PYELOGRAM  12/20/2018   • FL RETROGRADE PYELOGRAM  12/5/2019   • INGUINAL HERNIA REPAIR Bilateral 2015   • IR DRAINAGE TUBE CHECK AND/OR REMOVAL  3/5/2021   • PHOTODYNAMIC THERAPY      For Barretts esophagus   • ND COLONOSCOPY FLX DX W/COLLJ SPEC WHEN PFRMD N/A 4/25/2016    Procedure: COLONOSCOPY;  Surgeon: Coby Delacruz MD;  Location: [facility] GI LAB; Service: Gastroenterology   • ND CYSTO W/REMOVAL OF LESIONS SMALL N/A 12/5/2019    Procedure: CYSTO W/TURBT AND TRANSURETHRAL PROSTATE BIOPSY AND OPENING OF BLADDER NECK CONTRACTURE, B/L Retrograde pyelogram;  Surgeon: Duke Melgoza MD;  [facility];  Service: Urology   • ND CYSTOURETHROSCOPY W/DEST &/RMVL MED BLADDER TY N/A 4/16/2020    Procedure: CYSTOSCOPY, TRANSURETHRAL RESECTION OF BLADDER TUMOR (TURBT);   Surgeon: Duke Melgoza MD;  [facility];  Service: Urology   • ND CYSTOURETHROSCOPY WITH BIOPSY N/A 9/10/2020    Procedure: CYSTOSCOPY, COLLECTION OF LEFT KIDNEY CYTOLOGY, BILATERAL RETROGRADE PYELOGRAM, BLADDER WALL BIOPSIES  AND FULGERAION, RANDOM BLADDER TUMOR BIOPSIES;  Surgeon: Duke Melgoza MD;  Location: [address] [place] [address] MAIN OR;  Service: Urology   • ND CYSTOURETHROSCOPY WITH BIOPSY N/A 4/5/2022    Procedure: urethroscopy , biopsy of urethra with fulgeration;  Surgeon: Irish Frazier MD;  Location:  MAIN OR;  Service: Urology   • TONSILLECTOMY     • TRANSURETHRAL RESECTION OF PROSTATE N/A 12/20/2018    Procedure: CYSTO, PHOTO SELECTIVE VAPORIZATION OF PROSTATE, B/L RETROGRADE PYELOGRAM, TURBT;  Surgeon: Duke Melgoza MD;  [facility];  Service: Urology   • UMBILICAL HERNIA REPAIR  2012   • UPPER GASTROINTESTINAL ENDOSCOPY         Past Family History  Family History   Problem Relation Age of Onset   • Cancer Mother    • Other Mother         Digestive System Complications   • Diabetes Father    • Heart disease Father    • Hypertension Father    • Coronary artery disease Father    • Hyperlipidemia Father        Past Social history  Social History     Socioeconomic History   • Marital status: /Civil Union     Spouse name: Not on file   • Number of children: Not on file   • Years of education: Not on file   • Highest education level: Not on file   Occupational History   • Occupation: Sales position   Tobacco Use   • Smoking status: Former     Packs/day: 1 00     Years: 13 00     Pack years: 13 00     Types: Cigarettes     Quit date:      Years since quittin 0   • Smokeless tobacco: Never   Vaping Use   • Vaping Use: Never used   Substance and Sexual Activity   • Alcohol use: Not Currently     Alcohol/week: 2 0 standard drinks     Types: 1 Glasses of wine, 1 Cans of beer per week   • Drug use: Never   • Sexual activity: Not Currently   Other Topics Concern   • Not on file   Social History Narrative    Always uses seatbelt        Caffeine use- Drinks 2 cups of coffee daily     Social Determinants of Health     Financial Resource Strain: Low Risk    • Difficulty of Paying Living Expenses: Not very hard   Food Insecurity: Not on file   Transportation Needs: No Transportation Needs   • Lack of Transportation (Medical): No   • Lack of Transportation (Non-Medical):  No   Physical Activity: Not on file   Stress: Not on file   Social Connections: Not on file   Intimate Partner Violence: Not on file   Housing Stability: Not on file       Current Medications  Current Outpatient Medications   Medication Sig Dispense Refill   • atorvastatin (LIPITOR) 40 mg tablet TAKE ONE TABLET BY MOUTH AT BEDTIME 90 tablet 3   • Blood Glucose Monitoring Suppl (OneTouch Verio Flex System) w/Device KIT USE TO TEST BLOOD GLUCOSE DAILY 1 kit 0   • Cholecalciferol (VITAMIN D) 50 MCG (2000 UT) tablet Take 2,000 Units by mouth daily     • citalopram (CeleXA) 20 mg tablet TAKE ONE TABLET BY MOUTH EVERY DAY 30 tablet 3   • Farxiga 5 MG TABS TAKE ONE TABLET BY MOUTH EVERY DAY 90 tablet 2   • ferrous sulfate 324 (65 Fe) mg Take 1 tablet (324 mg total) by mouth daily 30 tablet 5   • Lancets (OneTouch Delica Plus [id number]) MISC TEST BLOOD GLUCOSE ONCE DAILY 100 each 0   • losartan (Cozaar) 50 mg tablet Take 1 tablet (50 mg total) by mouth daily 30 tablet 5   • magnesium oxide (MAG-OX) 400 mg Take 1 tablet (400 mg total) by mouth in the morning  180 tablet 2   • metolazone (ZAROXOLYN) 5 mg tablet Take 1 tablet (5 mg total) by mouth 3 (three) times a week 36 tablet 3   • metoprolol tartrate (LOPRESSOR) 25 mg tablet Take 1 tablet (25 mg total) by mouth every 12 (twelve) hours 180 tablet 3   • Multiple Vitamins-Minerals (OCUVITE-LUTEIN PO) Take 1 tablet by mouth 2 (two) times a day Pt is taking preservision      • nystatin (MYCOSTATIN) powder Apply topically 3 (three) times a day (Patient taking differently: Apply topically as needed) 30 g 3   • omeprazole (PriLOSEC) 40 MG capsule Take 1 capsule (40 mg total) by mouth in the morning  90 capsule 3   • OneTouch Verio test strip TEST ONCE A  strip 0   • sodium bicarbonate 650 mg tablet Take 2 tablets (1,300 mg total) by mouth 3 (three) times a day 360 tablet 3   • torsemide (DEMADEX) 20 mg tablet Take 1 tablet (20 mg total) by mouth 2 (two) times a day as needed (Shortness of breath) 60 tablet 3   • traMADol (ULTRAM) 50 mg tablet TAKE ONE TABLET BY MOUTH EVERY 6 HOURS AS NEEDED FOR MODERATE PAIN 20 tablet 0   • vitamin E, tocopherol, 400 units capsule Take 400 Units by mouth daily     • ondansetron (ZOFRAN) 4 mg tablet Take 1 tablet (4 mg total) by mouth every 8 (eight) hours as needed for nausea or vomiting (Patient not taking: Reported on 12/30/2022) 20 tablet 1   • torsemide (DEMADEX) 20 mg tablet Take 1 tablet (20 mg total) by mouth daily (Patient not taking: Reported on 12/30/2022) 20 tablet 0     No current facility-administered medications for this visit  Allergies  Allergies   Allergen Reactions   • Fentanyl Hallucinations   • Morphine And Related Other (See Comments)     While having an MI patient received morphine and had adverse reaction but doesn't know what happened  Past Medical History, Social History, Family History, medications and allergies were reviewed  Vitals  Vitals:    12/30/22 1132   BP: 106/58   BP Location: Left arm   Patient Position: Sitting   Cuff Size: Adult   Pulse: 57   SpO2: 97%   Weight: 83 5 kg (184 lb)       Physical Exam  Physical Exam  On examination he is in no acute distress  His abdomen is soft nontender nondistended  Ileal conduit is in place  It is pink and viable  Copious clear yellow urine is noted within the bag  Gait is slow with the assistance of a cane      Results  Lab Results   Component Value Date    PSA 0 4 09/19/2018    PSA 0 4 10/04/2017    PSA 0 4 10/17/2016     Lab Results   Component Value Date    GLUCOSE 128 08/13/2015    CALCIUM 9 0 12/27/2022     08/13/2015    K 3 8 12/27/2022    CO2 28 12/27/2022     12/27/2022    BUN 48 (H) 12/27/2022    CREATININE 2 61 (H) 12/27/2022     Lab Results   Component Value Date    WBC 7 82 12/27/2022    HGB 10 4 (L) 12/27/2022    HCT 33 9 (L) 12/27/2022     (H) 12/27/2022     12/27/2022         Office Urine Dip  No results found for this or any previous visit (from the past 1 hour(s)) ]

## 2023-01-01 ENCOUNTER — HOSPITAL ENCOUNTER (INPATIENT)
Facility: HOSPITAL | Age: 83
LOS: 3 days | DRG: 085 | End: 2023-12-29
Attending: EMERGENCY MEDICINE | Admitting: FAMILY MEDICINE
Payer: MEDICARE

## 2023-01-01 ENCOUNTER — APPOINTMENT (OUTPATIENT)
Dept: NON INVASIVE DIAGNOSTICS | Facility: HOSPITAL | Age: 83
DRG: 085 | End: 2023-01-01
Payer: MEDICARE

## 2023-01-01 ENCOUNTER — APPOINTMENT (OUTPATIENT)
Dept: NON INVASIVE DIAGNOSTICS | Facility: CLINIC | Age: 83
DRG: 085 | End: 2023-01-01
Payer: MEDICARE

## 2023-01-01 ENCOUNTER — HOME CARE VISIT (OUTPATIENT)
Dept: HOME HEALTH SERVICES | Facility: HOME HEALTHCARE | Age: 83
End: 2023-01-01

## 2023-01-01 ENCOUNTER — APPOINTMENT (EMERGENCY)
Dept: RADIOLOGY | Facility: HOSPITAL | Age: 83
DRG: 085 | End: 2023-01-01
Payer: MEDICARE

## 2023-01-01 ENCOUNTER — APPOINTMENT (INPATIENT)
Dept: RADIOLOGY | Facility: HOSPITAL | Age: 83
DRG: 085 | End: 2023-01-01
Payer: MEDICARE

## 2023-01-01 ENCOUNTER — TELEPHONE (OUTPATIENT)
Dept: HEMATOLOGY ONCOLOGY | Facility: CLINIC | Age: 83
End: 2023-01-01

## 2023-01-01 VITALS
SYSTOLIC BLOOD PRESSURE: 132 MMHG | DIASTOLIC BLOOD PRESSURE: 75 MMHG | BODY MASS INDEX: 28.42 KG/M2 | WEIGHT: 181.44 LBS | TEMPERATURE: 97.8 F | HEART RATE: 85 BPM | RESPIRATION RATE: 16 BRPM | OXYGEN SATURATION: 93 %

## 2023-01-01 DIAGNOSIS — Z85.51 HISTORY OF BLADDER CANCER: ICD-10-CM

## 2023-01-01 DIAGNOSIS — R06.00 DYSPNEA: Primary | ICD-10-CM

## 2023-01-01 DIAGNOSIS — J18.9 PNEUMONIA: ICD-10-CM

## 2023-01-01 DIAGNOSIS — D49.4 BLADDER TUMOR: ICD-10-CM

## 2023-01-01 DIAGNOSIS — N17.9 AKI (ACUTE KIDNEY INJURY) (HCC): ICD-10-CM

## 2023-01-01 DIAGNOSIS — R79.89 POSITIVE D DIMER: ICD-10-CM

## 2023-01-01 DIAGNOSIS — R79.89 ELEVATED TROPONIN I LEVEL: ICD-10-CM

## 2023-01-01 LAB
2HR DELTA HS TROPONIN: -820 NG/L
4HR DELTA HS TROPONIN: -843 NG/L
ALBUMIN SERPL BCP-MCNC: 3.1 G/DL (ref 3.5–5)
ALBUMIN SERPL BCP-MCNC: 3.3 G/DL (ref 3.5–5)
ALP SERPL-CCNC: 157 U/L (ref 34–104)
ALP SERPL-CCNC: 176 U/L (ref 34–104)
ALT SERPL W P-5'-P-CCNC: 111 U/L (ref 7–52)
ALT SERPL W P-5'-P-CCNC: 95 U/L (ref 7–52)
ANION GAP SERPL CALCULATED.3IONS-SCNC: 17 MMOL/L
ANION GAP SERPL CALCULATED.3IONS-SCNC: 18 MMOL/L
APTT PPP: 27 SECONDS (ref 23–37)
AST SERPL W P-5'-P-CCNC: 151 U/L (ref 13–39)
AST SERPL W P-5'-P-CCNC: 151 U/L (ref 13–39)
ATRIAL RATE: 150 BPM
BASOPHILS # BLD AUTO: 0.04 THOUSANDS/ÂΜL (ref 0–0.1)
BASOPHILS NFR BLD AUTO: 0 % (ref 0–1)
BILIRUB SERPL-MCNC: 0.81 MG/DL (ref 0.2–1)
BILIRUB SERPL-MCNC: 0.83 MG/DL (ref 0.2–1)
BUN SERPL-MCNC: 57 MG/DL (ref 5–25)
BUN SERPL-MCNC: 63 MG/DL (ref 5–25)
CALCIUM ALBUM COR SERPL-MCNC: 9.3 MG/DL (ref 8.3–10.1)
CALCIUM ALBUM COR SERPL-MCNC: 9.5 MG/DL (ref 8.3–10.1)
CALCIUM SERPL-MCNC: 8.7 MG/DL (ref 8.4–10.2)
CALCIUM SERPL-MCNC: 8.8 MG/DL (ref 8.4–10.2)
CARDIAC TROPONIN I PNL SERPL HS: 3862 NG/L
CARDIAC TROPONIN I PNL SERPL HS: 3885 NG/L
CARDIAC TROPONIN I PNL SERPL HS: 4705 NG/L
CHLORIDE SERPL-SCNC: 101 MMOL/L (ref 96–108)
CHLORIDE SERPL-SCNC: 99 MMOL/L (ref 96–108)
CK MB SERPL-MCNC: 21.9 NG/ML (ref 0.6–6.3)
CK SERPL-CCNC: 121 U/L (ref 39–308)
CO2 SERPL-SCNC: 17 MMOL/L (ref 21–32)
CO2 SERPL-SCNC: 18 MMOL/L (ref 21–32)
CREAT SERPL-MCNC: 3.52 MG/DL (ref 0.6–1.3)
CREAT SERPL-MCNC: 3.65 MG/DL (ref 0.6–1.3)
D DIMER PPP FEU-MCNC: 7.41 UG/ML FEU
EOSINOPHIL # BLD AUTO: 0.02 THOUSAND/ÂΜL (ref 0–0.61)
EOSINOPHIL NFR BLD AUTO: 0 % (ref 0–6)
ERYTHROCYTE [DISTWIDTH] IN BLOOD BY AUTOMATED COUNT: 19.4 % (ref 11.6–15.1)
ERYTHROCYTE [DISTWIDTH] IN BLOOD BY AUTOMATED COUNT: 19.6 % (ref 11.6–15.1)
GFR SERPL CREATININE-BSD FRML MDRD: 14 ML/MIN/1.73SQ M
GFR SERPL CREATININE-BSD FRML MDRD: 15 ML/MIN/1.73SQ M
GLUCOSE SERPL-MCNC: 154 MG/DL (ref 65–140)
GLUCOSE SERPL-MCNC: 160 MG/DL (ref 65–140)
GLUCOSE SERPL-MCNC: 170 MG/DL (ref 65–140)
GLUCOSE SERPL-MCNC: 172 MG/DL (ref 65–140)
GLUCOSE SERPL-MCNC: 174 MG/DL (ref 65–140)
GLUCOSE SERPL-MCNC: 177 MG/DL (ref 65–140)
HCT VFR BLD AUTO: 37.8 % (ref 36.5–49.3)
HCT VFR BLD AUTO: 38.9 % (ref 36.5–49.3)
HGB BLD-MCNC: 11.4 G/DL (ref 12–17)
HGB BLD-MCNC: 11.8 G/DL (ref 12–17)
IMM GRANULOCYTES # BLD AUTO: 0.26 THOUSAND/UL (ref 0–0.2)
IMM GRANULOCYTES NFR BLD AUTO: 1 % (ref 0–2)
INR PPP: 1.29 (ref 0.84–1.19)
LACTATE SERPL-SCNC: 1.9 MMOL/L (ref 0.5–2)
LYMPHOCYTES # BLD AUTO: 0.57 THOUSANDS/ÂΜL (ref 0.6–4.47)
LYMPHOCYTES NFR BLD AUTO: 3 % (ref 14–44)
MCH RBC QN AUTO: 28.1 PG (ref 26.8–34.3)
MCH RBC QN AUTO: 28.7 PG (ref 26.8–34.3)
MCHC RBC AUTO-ENTMCNC: 30.2 G/DL (ref 31.4–37.4)
MCHC RBC AUTO-ENTMCNC: 30.3 G/DL (ref 31.4–37.4)
MCV RBC AUTO: 93 FL (ref 82–98)
MCV RBC AUTO: 95 FL (ref 82–98)
MONOCYTES # BLD AUTO: 1.34 THOUSAND/ÂΜL (ref 0.17–1.22)
MONOCYTES NFR BLD AUTO: 8 % (ref 4–12)
NEUTROPHILS # BLD AUTO: 15.71 THOUSANDS/ÂΜL (ref 1.85–7.62)
NEUTS SEG NFR BLD AUTO: 88 % (ref 43–75)
NRBC BLD AUTO-RTO: 0 /100 WBCS
PLATELET # BLD AUTO: 277 THOUSANDS/UL (ref 149–390)
PLATELET # BLD AUTO: 309 THOUSANDS/UL (ref 149–390)
PMV BLD AUTO: 11.8 FL (ref 8.9–12.7)
PMV BLD AUTO: 12.2 FL (ref 8.9–12.7)
POTASSIUM SERPL-SCNC: 4.8 MMOL/L (ref 3.5–5.3)
POTASSIUM SERPL-SCNC: 5.1 MMOL/L (ref 3.5–5.3)
PROCALCITONIN SERPL-MCNC: 0.69 NG/ML
PROCALCITONIN SERPL-MCNC: 0.78 NG/ML
PROT SERPL-MCNC: 6.5 G/DL (ref 6.4–8.4)
PROT SERPL-MCNC: 6.8 G/DL (ref 6.4–8.4)
PROTHROMBIN TIME: 16 SECONDS (ref 11.6–14.5)
QRS AXIS: 269 DEGREES
QRSD INTERVAL: 144 MS
QT INTERVAL: 396 MS
QTC INTERVAL: 476 MS
RBC # BLD AUTO: 4.05 MILLION/UL (ref 3.88–5.62)
RBC # BLD AUTO: 4.11 MILLION/UL (ref 3.88–5.62)
SODIUM SERPL-SCNC: 134 MMOL/L (ref 135–147)
SODIUM SERPL-SCNC: 136 MMOL/L (ref 135–147)
T WAVE AXIS: 99 DEGREES
VENTRICULAR RATE: 87 BPM
WBC # BLD AUTO: 17.94 THOUSAND/UL (ref 4.31–10.16)
WBC # BLD AUTO: 18.95 THOUSAND/UL (ref 4.31–10.16)

## 2023-01-01 PROCEDURE — 93005 ELECTROCARDIOGRAM TRACING: CPT

## 2023-01-01 PROCEDURE — 80053 COMPREHEN METABOLIC PANEL: CPT | Performed by: FAMILY MEDICINE

## 2023-01-01 PROCEDURE — 99233 SBSQ HOSP IP/OBS HIGH 50: CPT | Performed by: GENERAL PRACTICE

## 2023-01-01 PROCEDURE — 84145 PROCALCITONIN (PCT): CPT | Performed by: FAMILY MEDICINE

## 2023-01-01 PROCEDURE — 82553 CREATINE MB FRACTION: CPT | Performed by: FAMILY MEDICINE

## 2023-01-01 PROCEDURE — 96360 HYDRATION IV INFUSION INIT: CPT

## 2023-01-01 PROCEDURE — 82948 REAGENT STRIP/BLOOD GLUCOSE: CPT

## 2023-01-01 PROCEDURE — 93970 EXTREMITY STUDY: CPT | Performed by: SURGERY

## 2023-01-01 PROCEDURE — 99223 1ST HOSP IP/OBS HIGH 75: CPT | Performed by: FAMILY MEDICINE

## 2023-01-01 PROCEDURE — 85730 THROMBOPLASTIN TIME PARTIAL: CPT

## 2023-01-01 PROCEDURE — 93970 EXTREMITY STUDY: CPT

## 2023-01-01 PROCEDURE — 71046 X-RAY EXAM CHEST 2 VIEWS: CPT

## 2023-01-01 PROCEDURE — 87040 BLOOD CULTURE FOR BACTERIA: CPT

## 2023-01-01 PROCEDURE — 85379 FIBRIN DEGRADATION QUANT: CPT

## 2023-01-01 PROCEDURE — 84484 ASSAY OF TROPONIN QUANT: CPT

## 2023-01-01 PROCEDURE — 85027 COMPLETE CBC AUTOMATED: CPT | Performed by: FAMILY MEDICINE

## 2023-01-01 PROCEDURE — 99238 HOSP IP/OBS DSCHRG MGMT 30/<: CPT

## 2023-01-01 PROCEDURE — 74176 CT ABD & PELVIS W/O CONTRAST: CPT

## 2023-01-01 PROCEDURE — 84145 PROCALCITONIN (PCT): CPT

## 2023-01-01 PROCEDURE — 99223 1ST HOSP IP/OBS HIGH 75: CPT | Performed by: INTERNAL MEDICINE

## 2023-01-01 PROCEDURE — 80053 COMPREHEN METABOLIC PANEL: CPT

## 2023-01-01 PROCEDURE — 83605 ASSAY OF LACTIC ACID: CPT

## 2023-01-01 PROCEDURE — 99232 SBSQ HOSP IP/OBS MODERATE 35: CPT | Performed by: INTERNAL MEDICINE

## 2023-01-01 PROCEDURE — 99284 EMERGENCY DEPT VISIT MOD MDM: CPT

## 2023-01-01 PROCEDURE — 85610 PROTHROMBIN TIME: CPT

## 2023-01-01 PROCEDURE — 99291 CRITICAL CARE FIRST HOUR: CPT | Performed by: EMERGENCY MEDICINE

## 2023-01-01 PROCEDURE — 85025 COMPLETE CBC W/AUTO DIFF WBC: CPT

## 2023-01-01 PROCEDURE — 93010 ELECTROCARDIOGRAM REPORT: CPT | Performed by: INTERNAL MEDICINE

## 2023-01-01 PROCEDURE — 36415 COLL VENOUS BLD VENIPUNCTURE: CPT

## 2023-01-01 PROCEDURE — NC001 PR NO CHARGE: Performed by: GENERAL PRACTICE

## 2023-01-01 PROCEDURE — 71250 CT THORAX DX C-: CPT

## 2023-01-01 PROCEDURE — 82550 ASSAY OF CK (CPK): CPT | Performed by: FAMILY MEDICINE

## 2023-01-01 PROCEDURE — G1004 CDSM NDSC: HCPCS

## 2023-01-01 RX ORDER — LORATADINE 10 MG/1
10 TABLET ORAL DAILY PRN
Status: DISCONTINUED | OUTPATIENT
Start: 2023-01-01 | End: 2023-12-29 | Stop reason: HOSPADM

## 2023-01-01 RX ORDER — PANTOPRAZOLE SODIUM 40 MG/1
40 TABLET, DELAYED RELEASE ORAL
Status: DISCONTINUED | OUTPATIENT
Start: 2023-01-01 | End: 2023-12-29 | Stop reason: HOSPADM

## 2023-01-01 RX ORDER — HEPARIN SODIUM 5000 [USP'U]/ML
5000 INJECTION, SOLUTION INTRAVENOUS; SUBCUTANEOUS EVERY 8 HOURS SCHEDULED
Status: DISCONTINUED | OUTPATIENT
Start: 2023-01-01 | End: 2023-01-01

## 2023-01-01 RX ORDER — HYDRALAZINE HYDROCHLORIDE 10 MG/1
10 TABLET, FILM COATED ORAL 3 TIMES DAILY
Status: DISCONTINUED | OUTPATIENT
Start: 2023-01-01 | End: 2023-01-01

## 2023-01-01 RX ORDER — CITALOPRAM 20 MG/1
20 TABLET ORAL DAILY
Status: DISCONTINUED | OUTPATIENT
Start: 2023-01-01 | End: 2023-12-29 | Stop reason: HOSPADM

## 2023-01-01 RX ORDER — BISACODYL 10 MG
10 SUPPOSITORY, RECTAL RECTAL ONCE
Status: DISCONTINUED | OUTPATIENT
Start: 2023-01-01 | End: 2023-12-29 | Stop reason: HOSPADM

## 2023-01-01 RX ORDER — CALCITRIOL 0.25 UG/1
0.25 CAPSULE, LIQUID FILLED ORAL 2 TIMES WEEKLY
Status: DISCONTINUED | OUTPATIENT
Start: 2023-01-01 | End: 2023-01-01

## 2023-01-01 RX ORDER — INSULIN LISPRO 100 [IU]/ML
1-6 INJECTION, SOLUTION INTRAVENOUS; SUBCUTANEOUS
Status: DISCONTINUED | OUTPATIENT
Start: 2023-01-01 | End: 2023-01-01

## 2023-01-01 RX ORDER — ASCORBIC ACID 500 MG
500 TABLET ORAL DAILY
Status: DISCONTINUED | OUTPATIENT
Start: 2023-01-01 | End: 2023-01-01

## 2023-01-01 RX ORDER — SENNOSIDES 8.6 MG
1 TABLET ORAL DAILY
Status: DISCONTINUED | OUTPATIENT
Start: 2023-01-01 | End: 2023-12-29 | Stop reason: HOSPADM

## 2023-01-01 RX ORDER — FLUTICASONE PROPIONATE 50 MCG
1 SPRAY, SUSPENSION (ML) NASAL DAILY
Status: DISCONTINUED | OUTPATIENT
Start: 2023-01-01 | End: 2023-12-29 | Stop reason: HOSPADM

## 2023-01-01 RX ORDER — ONDANSETRON 2 MG/ML
4 INJECTION INTRAMUSCULAR; INTRAVENOUS EVERY 6 HOURS PRN
Status: DISCONTINUED | OUTPATIENT
Start: 2023-01-01 | End: 2023-12-29 | Stop reason: HOSPADM

## 2023-01-01 RX ORDER — DOCUSATE SODIUM 100 MG/1
100 CAPSULE, LIQUID FILLED ORAL 2 TIMES DAILY
Status: DISCONTINUED | OUTPATIENT
Start: 2023-01-01 | End: 2023-12-29 | Stop reason: HOSPADM

## 2023-01-01 RX ORDER — FERROUS GLUCONATE 324(38)MG
324 TABLET ORAL
Status: DISCONTINUED | OUTPATIENT
Start: 2023-01-01 | End: 2023-01-01

## 2023-01-01 RX ORDER — ACETAMINOPHEN 325 MG/1
650 TABLET ORAL EVERY 4 HOURS PRN
Status: DISCONTINUED | OUTPATIENT
Start: 2023-01-01 | End: 2023-12-29 | Stop reason: HOSPADM

## 2023-01-01 RX ORDER — SODIUM BICARBONATE 650 MG/1
1300 TABLET ORAL 2 TIMES DAILY
Status: DISCONTINUED | OUTPATIENT
Start: 2023-01-01 | End: 2023-12-29 | Stop reason: HOSPADM

## 2023-01-01 RX ORDER — ATORVASTATIN CALCIUM 40 MG/1
40 TABLET, FILM COATED ORAL
Status: DISCONTINUED | OUTPATIENT
Start: 2023-01-01 | End: 2023-01-01

## 2023-01-01 RX ORDER — OXYCODONE HYDROCHLORIDE 5 MG/1
5 TABLET ORAL EVERY 2 HOUR PRN
Status: DISCONTINUED | OUTPATIENT
Start: 2023-01-01 | End: 2023-12-29 | Stop reason: HOSPADM

## 2023-01-01 RX ORDER — LANOLIN ALCOHOL/MO/W.PET/CERES
6 CREAM (GRAM) TOPICAL
Status: DISCONTINUED | OUTPATIENT
Start: 2023-01-01 | End: 2023-12-29 | Stop reason: HOSPADM

## 2023-01-01 RX ORDER — FOLIC ACID 1 MG/1
1 TABLET ORAL DAILY
Status: DISCONTINUED | OUTPATIENT
Start: 2023-01-01 | End: 2023-01-01

## 2023-01-01 RX ORDER — LANOLIN ALCOHOL/MO/W.PET/CERES
400 CREAM (GRAM) TOPICAL DAILY
Status: DISCONTINUED | OUTPATIENT
Start: 2023-01-01 | End: 2023-01-01

## 2023-01-01 RX ORDER — TRAZODONE HYDROCHLORIDE 50 MG/1
25 TABLET ORAL
Status: DISCONTINUED | OUTPATIENT
Start: 2023-01-01 | End: 2023-12-29 | Stop reason: HOSPADM

## 2023-01-01 RX ORDER — SIMETHICONE 80 MG
80 TABLET,CHEWABLE ORAL 4 TIMES DAILY PRN
Status: DISCONTINUED | OUTPATIENT
Start: 2023-01-01 | End: 2023-12-29 | Stop reason: HOSPADM

## 2023-01-01 RX ORDER — POLYETHYLENE GLYCOL 3350 17 G/17G
17 POWDER, FOR SOLUTION ORAL DAILY
Status: DISCONTINUED | OUTPATIENT
Start: 2023-01-01 | End: 2023-12-29 | Stop reason: HOSPADM

## 2023-01-01 RX ORDER — AMLODIPINE BESYLATE 5 MG/1
5 TABLET ORAL 2 TIMES DAILY
Status: DISCONTINUED | OUTPATIENT
Start: 2023-01-01 | End: 2023-01-01

## 2023-01-01 RX ORDER — LORAZEPAM 0.5 MG/1
0.5 TABLET ORAL EVERY 8 HOURS PRN
Status: DISCONTINUED | OUTPATIENT
Start: 2023-01-01 | End: 2023-12-29 | Stop reason: HOSPADM

## 2023-01-01 RX ADMIN — CEFTRIAXONE SODIUM 2000 MG: 10 INJECTION, POWDER, FOR SOLUTION INTRAVENOUS at 14:18

## 2023-01-01 RX ADMIN — ATORVASTATIN CALCIUM 40 MG: 40 TABLET, FILM COATED ORAL at 21:41

## 2023-01-01 RX ADMIN — Medication 6 MG: at 21:40

## 2023-01-01 RX ADMIN — MAGNESIUM OXIDE TAB 400 MG (241.3 MG ELEMENTAL MG) 400 MG: 400 (241.3 MG) TAB at 10:02

## 2023-01-01 RX ADMIN — METOPROLOL TARTRATE 25 MG: 25 TABLET, FILM COATED ORAL at 21:41

## 2023-01-01 RX ADMIN — SENNOSIDES 8.6 MG: 8.6 TABLET, FILM COATED ORAL at 17:25

## 2023-01-01 RX ADMIN — SENNOSIDES 8.6 MG: 8.6 TABLET, FILM COATED ORAL at 08:30

## 2023-01-01 RX ADMIN — Medication 1 TABLET: at 10:02

## 2023-01-01 RX ADMIN — POLYETHYLENE GLYCOL 3350 17 G: 17 POWDER, FOR SOLUTION ORAL at 17:25

## 2023-01-01 RX ADMIN — Medication 6 MG: at 22:16

## 2023-01-01 RX ADMIN — SODIUM BICARBONATE 650 MG TABLET 1300 MG: at 08:30

## 2023-01-01 RX ADMIN — CITALOPRAM HYDROBROMIDE 20 MG: 20 TABLET ORAL at 08:31

## 2023-01-01 RX ADMIN — SODIUM CHLORIDE 500 ML: 0.9 INJECTION, SOLUTION INTRAVENOUS at 13:21

## 2023-01-01 RX ADMIN — SODIUM BICARBONATE 650 MG TABLET 1300 MG: at 09:54

## 2023-01-01 RX ADMIN — Medication 1 TABLET: at 17:25

## 2023-01-01 RX ADMIN — SODIUM BICARBONATE 650 MG TABLET 1300 MG: at 17:25

## 2023-01-01 RX ADMIN — DOCUSATE SODIUM 100 MG: 100 CAPSULE, LIQUID FILLED ORAL at 17:25

## 2023-01-01 RX ADMIN — LORAZEPAM 0.5 MG: 0.5 TABLET ORAL at 16:50

## 2023-01-01 RX ADMIN — PANTOPRAZOLE SODIUM 40 MG: 40 TABLET, DELAYED RELEASE ORAL at 05:52

## 2023-01-01 RX ADMIN — AMLODIPINE BESYLATE 5 MG: 5 TABLET ORAL at 21:40

## 2023-01-01 RX ADMIN — FLUTICASONE PROPIONATE 1 SPRAY: 50 SPRAY, METERED NASAL at 09:55

## 2023-01-01 RX ADMIN — FERROUS GLUCONATE 324 MG: 324 TABLET ORAL at 09:55

## 2023-01-01 RX ADMIN — DOCUSATE SODIUM 100 MG: 100 CAPSULE, LIQUID FILLED ORAL at 08:31

## 2023-01-01 RX ADMIN — OXYCODONE HYDROCHLORIDE AND ACETAMINOPHEN 500 MG: 500 TABLET ORAL at 09:53

## 2023-01-01 RX ADMIN — DOXYCYCLINE 100 MG: 100 INJECTION, POWDER, LYOPHILIZED, FOR SOLUTION INTRAVENOUS at 15:20

## 2023-01-01 RX ADMIN — DOCUSATE SODIUM 100 MG: 100 CAPSULE, LIQUID FILLED ORAL at 09:53

## 2023-01-01 RX ADMIN — CALCITRIOL CAPSULES 0.25 MCG 0.25 MCG: 0.25 CAPSULE ORAL at 17:28

## 2023-01-01 RX ADMIN — HYDRALAZINE HYDROCHLORIDE 10 MG: 10 TABLET, FILM COATED ORAL at 21:40

## 2023-01-01 RX ADMIN — SENNOSIDES 8.6 MG: 8.6 TABLET, FILM COATED ORAL at 10:03

## 2023-01-01 RX ADMIN — METOPROLOL TARTRATE 25 MG: 25 TABLET, FILM COATED ORAL at 08:31

## 2023-01-01 RX ADMIN — HYDRALAZINE HYDROCHLORIDE 10 MG: 10 TABLET, FILM COATED ORAL at 09:54

## 2023-01-01 RX ADMIN — FOLIC ACID 1 MG: 1 TABLET ORAL at 09:54

## 2023-01-01 RX ADMIN — HYDRALAZINE HYDROCHLORIDE 10 MG: 10 TABLET, FILM COATED ORAL at 17:25

## 2023-01-01 RX ADMIN — INSULIN LISPRO 1 UNITS: 100 INJECTION, SOLUTION INTRAVENOUS; SUBCUTANEOUS at 09:55

## 2023-01-01 RX ADMIN — METOPROLOL TARTRATE 25 MG: 25 TABLET, FILM COATED ORAL at 10:03

## 2023-01-01 RX ADMIN — SODIUM BICARBONATE 650 MG TABLET 1300 MG: at 16:43

## 2023-01-01 RX ADMIN — POLYETHYLENE GLYCOL 3350 17 G: 17 POWDER, FOR SOLUTION ORAL at 10:03

## 2023-01-01 RX ADMIN — AMLODIPINE BESYLATE 5 MG: 5 TABLET ORAL at 09:53

## 2023-01-01 RX ADMIN — PANTOPRAZOLE SODIUM 40 MG: 40 TABLET, DELAYED RELEASE ORAL at 05:02

## 2023-01-01 RX ADMIN — DOCUSATE SODIUM 100 MG: 100 CAPSULE, LIQUID FILLED ORAL at 16:43

## 2023-01-01 RX ADMIN — CITALOPRAM HYDROBROMIDE 20 MG: 20 TABLET ORAL at 09:53

## 2023-01-01 RX ADMIN — FLUTICASONE PROPIONATE 1 SPRAY: 50 SPRAY, METERED NASAL at 08:33

## 2023-01-01 RX ADMIN — OXYCODONE HYDROCHLORIDE 5 MG: 5 TABLET ORAL at 16:43

## 2023-01-02 DIAGNOSIS — E11.59 TYPE 2 DIABETES MELLITUS WITH OTHER CIRCULATORY COMPLICATION, WITHOUT LONG-TERM CURRENT USE OF INSULIN (HCC): ICD-10-CM

## 2023-01-02 RX ORDER — LANCETS 33 GAUGE
EACH MISCELLANEOUS
Qty: 100 EACH | Refills: 0 | Status: SHIPPED | OUTPATIENT
Start: 2023-01-02

## 2023-01-02 RX ORDER — BLOOD SUGAR DIAGNOSTIC
STRIP MISCELLANEOUS
Qty: 100 STRIP | Refills: 0 | Status: SHIPPED | OUTPATIENT
Start: 2023-01-02

## 2023-01-04 ENCOUNTER — TELEPHONE (OUTPATIENT)
Dept: GYNECOLOGIC ONCOLOGY | Facility: CLINIC | Age: 83
End: 2023-01-04

## 2023-01-04 ENCOUNTER — OFFICE VISIT (OUTPATIENT)
Dept: HEMATOLOGY ONCOLOGY | Facility: CLINIC | Age: 83
End: 2023-01-04

## 2023-01-04 VITALS
RESPIRATION RATE: 17 BRPM | SYSTOLIC BLOOD PRESSURE: 120 MMHG | HEIGHT: 69 IN | TEMPERATURE: 97.5 F | WEIGHT: 185 LBS | OXYGEN SATURATION: 98 % | BODY MASS INDEX: 27.4 KG/M2 | HEART RATE: 62 BPM | DIASTOLIC BLOOD PRESSURE: 70 MMHG

## 2023-01-04 DIAGNOSIS — N18.4 ANEMIA DUE TO STAGE 4 CHRONIC KIDNEY DISEASE (HCC): ICD-10-CM

## 2023-01-04 DIAGNOSIS — C67.5 MALIGNANT NEOPLASM OF URINARY BLADDER NECK (HCC): ICD-10-CM

## 2023-01-04 DIAGNOSIS — D49.4 BLADDER TUMOR: ICD-10-CM

## 2023-01-04 DIAGNOSIS — R82.89 POSITIVE URINARY CYTOLOGY: ICD-10-CM

## 2023-01-04 DIAGNOSIS — D63.1 ANEMIA DUE TO STAGE 4 CHRONIC KIDNEY DISEASE (HCC): ICD-10-CM

## 2023-01-04 DIAGNOSIS — C67.8 MALIGNANT NEOPLASM OF OVERLAPPING SITES OF BLADDER (HCC): Primary | ICD-10-CM

## 2023-01-04 NOTE — TELEPHONE ENCOUNTER
Message was unclear as to what was exactly needed  According to Dr Jhon Medeiros notes, patient is to have a cysto at next available opening  Appointment just opened for 1/20 at 9:30 in [place]  Asked patient to call back to confirm this works and was the game plan

## 2023-01-04 NOTE — PROGRESS NOTES
Hematology Outpatient Follow - Up Note  Carson Diaz 80 y o  male MRN: @ Encounter: [id number]        Date:  1/4/2023        Assessment/ Plan:    42-year-old  male with superficial bladder cancers status post many BCG ease with refractoriness to BCG ease status post mitomycin instillation, cystoscopy on 04/2020 showed carcinoma in situ no evidence of invasive component     He received Pembrolizumab 200 mg flat dose every 3 weeks on 05/26/2020 after 2 cycles he had grade 3 elevation of the liver enzymes, thickening of the skin of the forearms consistent with toxicity to Pembrolizumab     Now with recurrent disease in the right iliac, pelvic area with extension into the aortic bifurcation      Evaluated at [facility], we feel the patient is candidate for chemotherapy utilizing gemcitabine/carboplatin this is stage IV disease      Initiated on carboplatin/gemcitabine on 09/30/2022, CT scan on December 2022 showed no evidence of disease in the abdomen or pelvis    Therapy was complicated with anemia requiring Aranesp    He had congestive heart failure as well    Resume gemcitabine/carboplatin every other week, Aranesp 200 mcg every other week if hemoglobin below 9 9 our goal between 10 to 11 g    Chemotherapy for 3 cycles and then imaging studies with maintenance avelumab        Labs and imaging studies are reviewed by ordering provider once results are available  If there are findings that need immediate attention, you will be contacted when results available  Discussing results and the implication on your healthcare is best discussed in person at your follow-up visit         HPI:    42-year-old  male with history of hypertension, coronary artery disease status post open heart surgery, diabetes mellitus type 2, diabetic neuropathy, he had a history of recurrent superficial bladder cancer status post many BCG unfortunately refractory to BCG, he had 1 time trans urethral resection of the bladder tumor and mitomycin instillation     Repeat cystoscopy on 04/16/2020 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, no evidence of invasive bladder cancers     He does not smoke, he drinks bourbon and vodka every night     Denies any headache blurred vision nausea vomiting diarrhea constipation dysuria hematuria melena hematochezia heat or cold intolerance  Initiated on Pembrolizumab 200 mg flat dose every 3 weeks on 05/26/2020, after 2 doses there was grade 3 elevation in the liver enzyme, alkaline phosphatase requiring discontinuation of Pembrolizumab, and later on normalization of the liver enzymes     Evaluated by ID there was no evidence of TB of the liver     Recurrence of disease on 09/2022 showed multiple suspicious enlarged lymph nodes that are extended internal iliac lymph nodes up to the aortoiliac bifurcation, evaluated at [address], he was not a candidate for surgical resection     Initiated on gemcitabine/carboplatin on 9 32,020 to 2 weeks on 1 week off, he received 3 cycles complicated with anemia requiring holding chemotherapy in December 2022, CAT scan in December 2000 3-2 showed no evidence of lymphadenopathy in the abdomen or pelvis  Interval History:    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 initiated on Aranesp, with exacerbation of congestive heart failure by anemia, chemotherapy to be done every other week for 3 more cycles (6 doses)    Molecular tests:    TERT, ARID1A mutation       Test Results:    Imaging: XR chest pa & lateral    Result Date: 12/18/2022  Narrative: CHEST INDICATION:   R06 02: Shortness of breath  COMPARISON:  CXR 10/8/2021, abdomen CT 11/30/2022, chest CT 11/25/2020  EXAM PERFORMED/VIEWS:  XR CHEST PA & LATERAL FINDINGS: Mild cardiomegaly, CABG  Mild pulmonary venous congestion  Small effusions  Osseous structures appear within normal limits for patient age  Impression: Mild pulmonary venous congestion with small effusions  Workstation performed: [id number]       Labs:   Lab Results   Component Value Date    WBC 7 82 12/27/2022    HGB 10 4 (L) 12/27/2022    HCT 33 9 (L) 12/27/2022     (H) 12/27/2022     12/27/2022     Lab Results   Component Value Date     08/13/2015    K 3 8 12/27/2022     12/27/2022    CO2 28 12/27/2022    ANIONGAP 10 08/13/2015    BUN 48 (H) 12/27/2022    CREATININE 2 61 (H) 12/27/2022    GLUCOSE 128 08/13/2015    GLUF 142 (H) 12/27/2022    CALCIUM 9 0 12/27/2022    CORRECTEDCA 9 6 12/27/2022    AST 20 12/27/2022    ALT 22 12/27/2022    ALKPHOS 90 12/27/2022    PROT 6 8 08/13/2015    BILITOT 0 58 08/13/2015    EGFR 21 12/27/2022       Lab Results   Component Value Date    IRON 54 (L) 12/19/2022    TIBC 312 12/19/2022    FERRITIN 423 (H) 12/19/2022       Lab Results   Component Value Date    DEPLBMOW94 666 08/13/2015         ROS: Review of Systems   Constitutional: Negative  Negative for appetite change, chills, diaphoresis, fatigue, fever and unexpected weight change  HENT:   Negative for hearing loss, lump/mass, mouth sores, nosebleeds, sore throat, trouble swallowing and voice change  Eyes: Negative  Negative for eye problems and icterus  Respiratory: Positive for shortness of breath (Exertional dyspnea)  Negative for chest tightness, cough and hemoptysis  Cardiovascular: Negative for chest pain and leg swelling (Improved with torsemide)  Gastrointestinal: Negative for abdominal distention, abdominal pain, blood in stool, constipation, diarrhea and nausea  Endocrine: Negative  Genitourinary: Negative for dysuria, frequency, hematuria and pelvic pain  Musculoskeletal: Negative  Negative for arthralgias, back pain, flank pain, gait problem, myalgias and neck stiffness  Skin: Negative for itching and rash     Neurological: Negative for dizziness, gait problem, headaches, light-headedness, numbness and speech difficulty  Hematological: Negative for adenopathy  Does not bruise/bleed easily  Psychiatric/Behavioral: Negative for confusion, decreased concentration, depression and sleep disturbance  The patient is not nervous/anxious  Current Medications: Reviewed  Allergies: Reviewed  PMH/FH/SH:  Reviewed      Physical Exam:    Body surface area is 2 meters squared  Wt Readings from Last 3 Encounters:   01/04/23 83 9 kg (185 lb)   12/30/22 83 5 kg (184 lb)   12/20/22 96 6 kg (213 lb)        Temp Readings from Last 3 Encounters:   01/04/23 97 5 °F (36 4 °C)   12/29/22 (!) 97 3 °F (36 3 °C) (Temporal)   12/20/22 98 °F (36 7 °C) (Tympanic)        BP Readings from Last 3 Encounters:   01/04/23 120/70   12/30/22 106/58   12/29/22 142/62         Pulse Readings from Last 3 Encounters:   01/04/23 62   12/30/22 57   12/29/22 72        Physical Exam  Vitals reviewed  Constitutional:       General: He is not in acute distress  Appearance: He is well-developed  He is not diaphoretic  HENT:      Head: Normocephalic and atraumatic  Eyes:      Conjunctiva/sclera: Conjunctivae normal    Neck:      Trachea: No tracheal deviation  Cardiovascular:      Rate and Rhythm: Normal rate and regular rhythm  Heart sounds: No murmur heard  No friction rub  No gallop  Pulmonary:      Effort: Pulmonary effort is normal  No respiratory distress  Breath sounds: Normal breath sounds  No wheezing or rales  Chest:      Chest wall: No tenderness  Abdominal:      General: There is no distension  Palpations: Abdomen is soft  Tenderness: There is no abdominal tenderness  Musculoskeletal:         General: Swelling present  Cervical back: Normal range of motion and neck supple  Right lower leg: Edema present  Left lower leg: Edema present  Lymphadenopathy:      Cervical: No cervical adenopathy  Skin:     General: Skin is warm and dry  Coloration: Skin is not pale  Findings: No erythema  Neurological:      Mental Status: He is alert and oriented to person, place, and time  Psychiatric:         Behavior: Behavior normal          Thought Content: Thought content normal          Judgment: Judgment normal          ECO  Goals and Barriers:  Current Goal: Minimize effects of disease  Barriers: None  Patient's Capacity to Self Care:  Patient is able to self care      Code Status: [unfilled]

## 2023-01-04 NOTE — TELEPHONE ENCOUNTER
Plan has been updated, patient to receive aranesp injection every other week  Please schedule patient  Thanks!

## 2023-01-06 ENCOUNTER — TELEPHONE (OUTPATIENT)
Dept: NEPHROLOGY | Facility: CLINIC | Age: 83
End: 2023-01-06

## 2023-01-10 ENCOUNTER — TELEPHONE (OUTPATIENT)
Dept: OTHER | Facility: HOSPITAL | Age: 83
End: 2023-01-10

## 2023-01-10 ENCOUNTER — OFFICE VISIT (OUTPATIENT)
Dept: CARDIOLOGY CLINIC | Facility: CLINIC | Age: 83
End: 2023-01-10

## 2023-01-10 ENCOUNTER — OFFICE VISIT (OUTPATIENT)
Dept: INTERNAL MEDICINE CLINIC | Facility: CLINIC | Age: 83
End: 2023-01-10

## 2023-01-10 ENCOUNTER — LAB (OUTPATIENT)
Dept: LAB | Facility: CLINIC | Age: 83
End: 2023-01-10

## 2023-01-10 VITALS
DIASTOLIC BLOOD PRESSURE: 68 MMHG | BODY MASS INDEX: 26.79 KG/M2 | HEART RATE: 59 BPM | SYSTOLIC BLOOD PRESSURE: 150 MMHG | OXYGEN SATURATION: 100 % | WEIGHT: 181.4 LBS

## 2023-01-10 VITALS
RESPIRATION RATE: 19 BRPM | BODY MASS INDEX: 25.77 KG/M2 | OXYGEN SATURATION: 98 % | HEIGHT: 69 IN | HEART RATE: 69 BPM | WEIGHT: 174 LBS | DIASTOLIC BLOOD PRESSURE: 64 MMHG | TEMPERATURE: 96.8 F | SYSTOLIC BLOOD PRESSURE: 141 MMHG

## 2023-01-10 DIAGNOSIS — I25.10 CORONARY ARTERY DISEASE INVOLVING NATIVE CORONARY ARTERY OF NATIVE HEART WITHOUT ANGINA PECTORIS: Primary | ICD-10-CM

## 2023-01-10 DIAGNOSIS — I10 BENIGN ESSENTIAL HYPERTENSION: Primary | ICD-10-CM

## 2023-01-10 DIAGNOSIS — E83.42 HYPOMAGNESEMIA: ICD-10-CM

## 2023-01-10 DIAGNOSIS — N18.4 STAGE 4 CHRONIC KIDNEY DISEASE (HCC): ICD-10-CM

## 2023-01-10 DIAGNOSIS — E11.59 TYPE 2 DIABETES MELLITUS WITH OTHER CIRCULATORY COMPLICATION, WITHOUT LONG-TERM CURRENT USE OF INSULIN (HCC): ICD-10-CM

## 2023-01-10 DIAGNOSIS — I45.10 RBBB: ICD-10-CM

## 2023-01-10 DIAGNOSIS — D64.9 ANEMIA, UNSPECIFIED TYPE: ICD-10-CM

## 2023-01-10 DIAGNOSIS — N25.81 SECONDARY HYPERPARATHYROIDISM OF RENAL ORIGIN (HCC): ICD-10-CM

## 2023-01-10 DIAGNOSIS — D63.1 ANEMIA DUE TO STAGE 4 CHRONIC KIDNEY DISEASE (HCC): ICD-10-CM

## 2023-01-10 DIAGNOSIS — I25.5 ISCHEMIC CARDIOMYOPATHY: ICD-10-CM

## 2023-01-10 DIAGNOSIS — E87.20 METABOLIC ACIDOSIS: ICD-10-CM

## 2023-01-10 DIAGNOSIS — N18.4 ANEMIA DUE TO STAGE 4 CHRONIC KIDNEY DISEASE (HCC): ICD-10-CM

## 2023-01-10 DIAGNOSIS — N17.9 AKI (ACUTE KIDNEY INJURY) (HCC): ICD-10-CM

## 2023-01-10 DIAGNOSIS — R80.1 PERSISTENT PROTEINURIA: ICD-10-CM

## 2023-01-10 DIAGNOSIS — R60.0 BILATERAL LOWER EXTREMITY EDEMA: ICD-10-CM

## 2023-01-10 DIAGNOSIS — I12.9 BENIGN HYPERTENSION WITH CHRONIC KIDNEY DISEASE, STAGE IV (HCC): ICD-10-CM

## 2023-01-10 DIAGNOSIS — C67.8 MALIGNANT NEOPLASM OF OVERLAPPING SITES OF BLADDER (HCC): ICD-10-CM

## 2023-01-10 DIAGNOSIS — N18.4 BENIGN HYPERTENSION WITH CHRONIC KIDNEY DISEASE, STAGE IV (HCC): ICD-10-CM

## 2023-01-10 DIAGNOSIS — I50.23 ACUTE ON CHRONIC SYSTOLIC CONGESTIVE HEART FAILURE (HCC): ICD-10-CM

## 2023-01-10 LAB
25(OH)D3 SERPL-MCNC: 37.2 NG/ML (ref 30–100)
ALBUMIN SERPL BCP-MCNC: 3.9 G/DL (ref 3.5–5)
ALP SERPL-CCNC: 93 U/L (ref 46–116)
ALT SERPL W P-5'-P-CCNC: 19 U/L (ref 12–78)
AMORPH URATE CRY URNS QL MICRO: ABNORMAL
ANION GAP SERPL CALCULATED.3IONS-SCNC: 9 MMOL/L (ref 4–13)
AST SERPL W P-5'-P-CCNC: 15 U/L (ref 5–45)
BACTERIA UR QL AUTO: ABNORMAL /HPF
BILIRUB SERPL-MCNC: 0.58 MG/DL (ref 0.2–1)
BILIRUB UR QL STRIP: NEGATIVE
BUN SERPL-MCNC: 74 MG/DL (ref 5–25)
CALCIUM SERPL-MCNC: 10.2 MG/DL (ref 8.3–10.1)
CHLORIDE SERPL-SCNC: 102 MMOL/L (ref 96–108)
CLARITY UR: ABNORMAL
CO2 SERPL-SCNC: 28 MMOL/L (ref 21–32)
COLOR UR: YELLOW
CREAT SERPL-MCNC: 3.29 MG/DL (ref 0.6–1.3)
CREAT UR-MCNC: 72.2 MG/DL
ERYTHROCYTE [DISTWIDTH] IN BLOOD BY AUTOMATED COUNT: 16.1 % (ref 11.6–15.1)
GFR SERPL CREATININE-BSD FRML MDRD: 16 ML/MIN/1.73SQ M
GLUCOSE P FAST SERPL-MCNC: 170 MG/DL (ref 65–99)
GLUCOSE UR STRIP-MCNC: NEGATIVE MG/DL
HCT VFR BLD AUTO: 40.3 % (ref 36.5–49.3)
HGB BLD-MCNC: 12.6 G/DL (ref 12–17)
HGB UR QL STRIP.AUTO: NEGATIVE
KETONES UR STRIP-MCNC: NEGATIVE MG/DL
LEUKOCYTE ESTERASE UR QL STRIP: ABNORMAL
MAGNESIUM SERPL-MCNC: 2.6 MG/DL (ref 1.6–2.6)
MCH RBC QN AUTO: 32.1 PG (ref 26.8–34.3)
MCHC RBC AUTO-ENTMCNC: 31.3 G/DL (ref 31.4–37.4)
MCV RBC AUTO: 103 FL (ref 82–98)
NITRITE UR QL STRIP: NEGATIVE
NON-SQ EPI CELLS URNS QL MICRO: ABNORMAL /HPF
PH UR STRIP.AUTO: 7 [PH]
PHOSPHATE SERPL-MCNC: 3.7 MG/DL (ref 2.3–4.1)
PLATELET # BLD AUTO: 242 THOUSANDS/UL (ref 149–390)
PMV BLD AUTO: 12.1 FL (ref 8.9–12.7)
POTASSIUM SERPL-SCNC: 4.7 MMOL/L (ref 3.5–5.3)
PROT SERPL-MCNC: 8 G/DL (ref 6.4–8.4)
PROT UR STRIP-MCNC: ABNORMAL MG/DL
PROT UR-MCNC: 39 MG/DL
PROT/CREAT UR: 0.54 MG/G{CREAT} (ref 0–0.1)
PTH-INTACT SERPL-MCNC: 234.1 PG/ML (ref 18.4–80.1)
RBC # BLD AUTO: 3.93 MILLION/UL (ref 3.88–5.62)
RBC #/AREA URNS AUTO: ABNORMAL /HPF
SODIUM SERPL-SCNC: 139 MMOL/L (ref 135–147)
SP GR UR STRIP.AUTO: 1.01 (ref 1–1.03)
UROBILINOGEN UR STRIP-ACNC: 3 MG/DL
WBC # BLD AUTO: 10.81 THOUSAND/UL (ref 4.31–10.16)
WBC #/AREA URNS AUTO: ABNORMAL /HPF

## 2023-01-10 RX ORDER — SODIUM CHLORIDE 9 MG/ML
20 INJECTION, SOLUTION INTRAVENOUS ONCE
Status: CANCELLED | OUTPATIENT
Start: 2023-01-19

## 2023-01-10 RX ORDER — SODIUM CHLORIDE 9 MG/ML
20 INJECTION, SOLUTION INTRAVENOUS ONCE
Status: CANCELLED | OUTPATIENT
Start: 2023-02-01

## 2023-01-10 NOTE — ASSESSMENT & PLAN NOTE
Interestingly that despite vigorous diuresis 6 his blood pressure is improving  The fluid overload may have affected his cardiac function which seems to be improving  We will continue to monitor blood pressure readings and make adjustment to medication as needed

## 2023-01-10 NOTE — ASSESSMENT & PLAN NOTE
Extensive disease  To restart chemotherapy as per oncology in the near future    Continue to follow-up patient's blood counts including CBC, comprehensive metabolic profile

## 2023-01-10 NOTE — ASSESSMENT & PLAN NOTE
With recent acute illness patient did have some decreased appetite  Apparently his appetite is improving  Continues to monitor blood sugars at home    Will call if any changes and we will make adjustments to treatment in the future as needed  Lab Results   Component Value Date    HGBA1C 6 5 (H) 10/25/2022

## 2023-01-10 NOTE — RESULT ENCOUNTER NOTE
Please call the patient regarding his abnormal result  Please inform patient that renal function has worsened to creatinine 3 29  Please ask him to decrease the dose of metolazone to twice a week

## 2023-01-10 NOTE — ASSESSMENT & PLAN NOTE
Patient is on medication by his hematologist to help with improvement with his anemia  Having a repeat CBC performed today    Will be followed closely

## 2023-01-10 NOTE — PATIENT INSTRUCTIONS
All questions asked and answered  The patient has been instructed to call office at [phone] with any questions or concerns  Please obtain prescribed blood work and urine studies prior to next appointment   Avoid NSAID products which include: Motrin, Advil, Ibuprofen, Aleve, Naprosyn, Naproxen, Mobic or Celebrex    Continue to hole water pills for now  Get BMP tomorrow am  Office to call with updated information  Return in 3 months  Low-Sodium Diet   AMBULATORY CARE:   A low-sodium diet  limits foods that are high in sodium (salt)  You will need to follow a low-sodium diet if you have high blood pressure, kidney disease, or heart failure  You may also need to follow this diet if you have a condition that is causing your body to retain (hold) extra fluid  You may need to limit the amount of sodium you eat in a day to 1,500 to 2,000 mg  Ask your healthcare provider how much sodium you can have each day  How to use food labels to choose foods that are low in sodium:  Read food labels to find the amount of sodium they contain  The amount of sodium is listed in milligrams (mg)  The % Daily Value (DV) column tells you how much of your daily needs are met by 1 serving of the food for each nutrient listed  Choose foods that have less than 5% of the DV of sodium  These foods are considered low in sodium  Foods that have 20% or more of the DV of sodium are considered high in sodium  Some food labels may also list any of the following terms that tell you about the sodium content in the food:  Sodium-free:  Less than 5 mg in each serving    Very low sodium:  35 mg of sodium or less in each serving    Low sodium:  140 mg of sodium or less in each serving    Reduced sodium:   At least 25% less sodium in each serving than the regular type    Light in sodium:  50% less sodium in each serving    Unsalted or no added salt:  No extra salt is added during processing (the food may still contain sodium)       Foods to avoid: Salty foods are high in sodium  You should avoid the following:  Processed foods:      Mixes for cornbread, biscuits, cake, and pudding     Instant foods, such as potatoes, cereals, noodles, and rice     Packaged foods, such as bread stuffing, rice and pasta mixes, snack dip mixes, and macaroni and cheese     Canned foods, such as canned vegetables, soups, broths, sauces, and vegetable or tomato juice    Snack foods, such as salted chips, popcorn, pretzels, pork rinds, salted crackers, and salted nuts    Frozen foods, such as dinners, entrees, vegetables with sauces, and breaded meats    Sauerkraut, pickled vegetables, and other foods prepared in brine    Meats and cheeses:      Smoked or cured meat, such as corned beef, torres, ham, hot dogs, and sausage    Canned meats or spreads, such as potted meats, sardines, anchovies, and imitation seafood    Deli or lunch meats, such as bologna, ham, turkey, and roast beef    Processed cheese, such as American cheese and cheese spreads    Condiments, sauces, and seasonings:      Salt (¼ teaspoon of salt contains 575 mg of sodium)    Seasonings made with salt, such as garlic salt, celery salt, onion salt, and seasoned salt    Regular soy sauce, barbecue sauce, teriyaki sauce, steak sauce, Worcestershire sauce, and most flavored vinegars    Canned gravy and mixes     Regular condiments, such as mustard, ketchup, and salad dressings    Pickles and olives    Meat tenderizers and monosodium glutamate (MSG)    Foods to include:  Read the food label to find the exact amount of sodium in each serving  Bread and cereal:  Try to choose breads with less than 80 mg of sodium per serving  Bread, roll, janse, tortilla, or unsalted crackers      Ready-to-eat cereals with less than 5% DV of sodium (examples include shredded wheat and puffed rice)    Pasta    Vegetables and fruits:      Unsalted fresh, frozen, or canned vegetables    Fresh, frozen, or canned fruits    Fruit juice    Dairy: One serving has about 150 mg of sodium  Milk, all types    Yogurt    Hard cheese, such as cheddar, Swiss, [employer], or [employer] and other protein foods:  Some raw meats may have added sodium  Plain meats, fish, and poultry     Eggs    Other foods:      Homemade pudding    Unsalted nuts, popcorn, or pretzels    Unsalted butter or margarine    Ways to decrease sodium:   Add spices and herbs to foods instead of salt during cooking  Use salt-free seasonings to add flavor to foods  Examples include onion powder, garlic powder, basil, scott powder, paprika, and parsley  Try lemon or lime juice or vinegar to give foods a tart flavor  Use hot peppers, pepper, or cayenne pepper to add a spicy flavor  Do not keep a salt shaker at your kitchen table  This may help keep you from adding salt to food at the table  A teaspoon of salt has 2,300 mg of sodium  It may take time to get used to enjoying the natural flavor of food instead of adding salt  Talk to your healthcare provider before you use salt substitutes  Some salt substitutes have a high amount of potassium and need to be avoided if you have kidney disease  Choose low-sodium foods at restaurants  Meals from restaurants are often high in sodium  Some restaurants have nutrition information on the menu that tells you the amount of sodium in their foods  If possible, ask for your food to be prepared with less, or no salt  Shop for unsalted or low-sodium foods and snacks at the grocery store  Examples include unsalted or low-sodium broths, soups, and canned vegetables  Choose fresh or frozen vegetables instead  Choose unsalted nuts or seeds or fresh fruits or vegetables as snacks  Read food labels and choose salt-free, very low-sodium, or low-sodium foods  © Copyright [employer] 2022 Information is for End User's use only and may not be sold, redistributed or otherwise used for commercial purposes   All illustrations and images included in [employer] [facility] are the copyrighted property of [employer]  or [facility] Gisele Lowe   The above information is an  only  It is not intended as medical advice for individual conditions or treatments  Talk to your doctor, nurse or pharmacist before following any medical regimen to see if it is safe and effective for you

## 2023-01-10 NOTE — PROGRESS NOTES
OFFICE FOLLOW UP - Nephrology   Lambert Wright 80 y o  male MRN: [id number]               ASSESSMENT and PLAN:  Alecia Sebastian was seen today for follow-up, hypertension and chronic kidney disease  Diagnoses and all orders for this visit:    Stage 4 chronic kidney disease ([facility] [address] )    Chronic kidney disease-mineral and bone disorder    Acute kidney injury ([facility] )    Benign hypertension with chronic kidney disease, stage IV ([facility] )    Type 2 diabetes mellitus with both eyes affected by mild nonproliferative retinopathy without macular edema, without long-term current use of insulin ([facility] )    Secondary hyperparathyroidism of renal origin ([facility] )    Anemia due to stage 4 chronic kidney disease ([place] [address] )  -     Ambulatory Referral to Nephrology    Bilateral lower extremity edema    Persistent proteinuria    Metabolic acidosis  -     sodium bicarbonate 650 mg tablet;  Take 2 tablets (1,300 mg total) by mouth 2 (two) times a day        Chronic kidney disease IV:  Assessment and Plan:  Etiology:  Suspect secondary to diabetic kidney disease, hypertension nephrosclerosis elevated, age related nephron loss, and prior KO  • after review medical records through [facility] or care everywhere recent baseline creatinine 2 2-2 6 dating back to 10/2021   • Most recent creatinine increased 3 29 from 2 6 -likely in the setting of diuretic and metolazone use-both placed on hold from cardiology on 1/10/2023   • However patient took both medications on 1/10/2023  • For repeat BMP -will check 1/13/2023  • Completed the Kidney Smart Class for CKD education x 2  • UPCR 0 54  • Not on ACE/ARB  • Avoid NSAIDS  • Remains on Farxiga  • Return in 3 months with updated blood work and urine studies  • Once BMP reviewed further recommendations to follow  • Instructed to monitor U/O over night with connected back      Persistent proteinuria:  Assessment and Plan:  • Current UPCR 0 54-stable   • suspect secondary to  hypertensive nephrosclerosis and diabetic nephropathy  • previously on irbesartan and stopped during episode of acute kidney injury in February 2021  • Avoiding ARB/ACE in the setting of worsening CKD  • Continue to monitor        Hypertension:  Assessment and Plan:  • Current BP elevated on arrival   • Currently on metoprolol 25 mg 2 times daily  • (torsemide and metolazone on hold)  • no change in current medications  • encourage low-sodium diet      Shortness of breath/edema  Assessment and plan  · Better and improved  · Current weight appears stable in office  · Home 174 8 lb-improved with diuetics  · Examining-euvolemic    Metabolic Acidosis:  Assessment and Plan:  • In the setting of advanced kidney disease  • Currently on sodium bicarbonate 1300 mg TID  • Current bicarb 28  • Continue sodium bicarbonate tablets and decreased to 2 gram BID     Anemia in the setting of Chronic Kidney Disease  • Current hgb 12 6  • Previous low iron stores  • On oral iron tablet   • Hematology following and help SENTHIL injection with improved hgb  • will repeat CBC and iron stores with next office visit     Bone Mineral Disease of CKD  Assessment and Plan:  • Will check PTH/phos and vit D with next office visit  • Calcium level slightly elevated at 10 2   • Elevation likely secondary to diuretic use     Hx of Bladder cancer  Assessment and Plan:   • S/P BCG and Keytruda   Status post radical cysto prostatectomy with 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  • s/p drainage of anterior wall abscess  • Follows Urology as outpatient      History of right pelvic abscess  Assessment and plan:  • status post IR guided drainage and drain placement and removal on 03/05/2021  • S/P antibiotics coure     Diabetes-management per primary team     Age related screening: Your primary caregiver may do yearly screening for colorectal cancer  It is recommended in all men and women over 48years old   You may have screening earlier if you have colon disease or a family history of colorectal cancer  Age related screening: Your primary caregiver may do yearly screening for colorectal cancer  It is recommended in all men and women over 48years old  You may have screening earlier if you have colon disease or a family history of colorectal cancer  Patient Instructions   • All questions asked and answered  The patient has been instructed to call office at [phone] with any questions or concerns  • Please obtain prescribed blood work and urine studies prior to next appointment   • Avoid NSAID products which include: Motrin, Advil, Ibuprofen, Aleve, Naprosyn, Naproxen, Mobic or Celebrex    • Continue to hole water pills for now  • Get BMP tomorrow am  • Office to call with updated information  • Return in 3 months        HPI:    I had the pleasure of seeing Trinity López today in the renal clinic for the continued management of CKD  He was last seen 11/20/2022 with Dr Marlo Barlow and renal function was stable with creatinine 2 6  Since his last visit he complained of shortness of breath and lower extremity edema  Diuretics and metolazone 3 times a week was added  Since the last visit, there has been no ER visits or hospitilizations  Patient has no complaints at this time and is feeling well  Patient denies any chest pain, shortness of breath or swelling  The last blood work was done on 1/10/23 which we have reviewed together and revealed increased creatinine 3 26 and was tod to hold both diuretic by cardiology  Last does was on 1/10/2023  For repeat BMP today           ROS:   Review of Systems   Constitutional: Negative  Negative for activity change, appetite change, chills, diaphoresis, fatigue and fever  HENT: Negative  Negative for congestion and facial swelling  Respiratory: Negative  Cardiovascular: Negative  Gastrointestinal: Negative  Endocrine: Negative  Genitourinary: Negative  Musculoskeletal: Negative  Skin: Negative      Allergic/Immunologic: Negative  Neurological: Negative  Hematological: Negative  Psychiatric/Behavioral: Negative           Allergies: Fentanyl and Morphine and related    Medications:   Current Outpatient Medications:   •  atorvastatin (LIPITOR) 40 mg tablet, TAKE ONE TABLET BY MOUTH AT BEDTIME, Disp: 90 tablet, Rfl: 3  •  Blood Glucose Monitoring Suppl (OneTouch Verio Flex System) w/Device KIT, USE TO TEST BLOOD GLUCOSE DAILY, Disp: 1 kit, Rfl: 0  •  Cholecalciferol (VITAMIN D) 50 MCG (2000 UT) tablet, Take 2,000 Units by mouth daily, Disp: , Rfl:   •  citalopram (CeleXA) 20 mg tablet, TAKE ONE TABLET BY MOUTH EVERY DAY, Disp: 30 tablet, Rfl: 3  •  Farxiga 5 MG TABS, TAKE ONE TABLET BY MOUTH EVERY DAY, Disp: 90 tablet, Rfl: 2  •  ferrous sulfate 324 (65 Fe) mg, Take 1 tablet (324 mg total) by mouth daily, Disp: 30 tablet, Rfl: 5  •  Lancets (OneTouch Delica Plus [id number]) MISC, TEST BLOOD GLUCOSE ONCE DAILY, Disp: 100 each, Rfl: 0  •  losartan (Cozaar) 50 mg tablet, Take 1 tablet (50 mg total) by mouth daily, Disp: 30 tablet, Rfl: 5  •  magnesium oxide (MAG-OX) 400 mg, Take 1 tablet (400 mg total) by mouth in the morning , Disp: 180 tablet, Rfl: 2  •  metoprolol tartrate (LOPRESSOR) 25 mg tablet, Take 1 tablet (25 mg total) by mouth every 12 (twelve) hours, Disp: 180 tablet, Rfl: 3  •  Multiple Vitamins-Minerals (OCUVITE-LUTEIN PO), Take 1 tablet by mouth 2 (two) times a day Pt is taking preservision , Disp: , Rfl:   •  omeprazole (PriLOSEC) 40 MG capsule, Take 1 capsule (40 mg total) by mouth in the morning , Disp: 90 capsule, Rfl: 3  •  ondansetron (ZOFRAN) 4 mg tablet, Take 1 tablet (4 mg total) by mouth every 8 (eight) hours as needed for nausea or vomiting, Disp: 20 tablet, Rfl: 1  •  OneTouch Verio test strip, TEST ONCE A DAY, Disp: 100 strip, Rfl: 0  •  sodium bicarbonate 650 mg tablet, Take 2 tablets (1,300 mg total) by mouth 2 (two) times a day, Disp: 360 tablet, Rfl: 3  •  traMADol (ULTRAM) 50 mg tablet, TAKE ONE TABLET BY MOUTH EVERY 6 HOURS AS NEEDED FOR MODERATE PAIN, Disp: 20 tablet, Rfl: 0  •  vitamin E, tocopherol, 400 units capsule, Take 400 Units by mouth daily, Disp: , Rfl:   •  metolazone (ZAROXOLYN) 5 mg tablet, Take 1 tablet (5 mg total) by mouth 3 (three) times a week (Patient not taking: Reported on 1/12/2023), Disp: 36 tablet, Rfl: 3  •  torsemide (DEMADEX) 20 mg tablet, Take 1 tablet (20 mg total) by mouth 2 (two) times a day as needed (Shortness of breath) (Patient not taking: Reported on 1/12/2023), Disp: 60 tablet, Rfl: 3    Past Medical History:   Diagnosis Date   • Acute kidney injury ([facility] [address] )    • Anemia Jan 2022   • Arthritis     Hands   • Wright esophagus    • BPH with obstruction/lower urinary tract symptoms    • CAD (coronary artery disease)     Last assessed 09/16/15   • Cancer ([facility] [address] )     bladder   • Cataract, acquired     Last assessed 10/10/17   • Chronic kidney disease    • Coronary artery disease    • Diabetes mellitus ([facility] [address] )     NIDDM   • Diabetic neuropathy (HCC)     Feet   • Enlarged prostate with lower urinary tract symptoms (LUTS)     Last assessed 10/10/17   • Erectile dysfunction    • GERD (gastroesophageal reflux disease)     Last assessed 10/10/17   • Hemoptysis     Last assessed 03/14/16   • Hypercholesterolemia     Last assessed 10/10/17   • Hypertension     Last assessed 10/10/17   • Kidney stone    • Macular degeneration     Right eye is particularly affected-peripheral vision intact   • Myocardial infarction [facility]) 1998   • OAB (overactive bladder)    • Testicular hypofunction    • Testicular hypogonadism     Last assessed 10/10/17   • Tinnitus    • Umbilical hernia     Last assessed 06/18/14   • Urge incontinence of urine    • Wears glasses      Past Surgical History:   Procedure Laterality Date   • BLADDER SURGERY     • COLONOSCOPY     • CORONARY ARTERY BYPASS GRAFT  07/16/2014    ABG x 4 LIMA to LAD,SVG to diagnoal 2 SVG to OM-1, SVG to PDA, resection of partial plerual mass   • CT GUIDED PERC DRAINAGE CATHETER PLACEMENT  02/19/2021   • CYSTOSCOPY  2013   • CYSTOSCOPY  02/08/2022    [facility]   • ESOPHAGOGASTRODUODENOSCOPY     • FL RETROGRADE PYELOGRAM  12/20/2018   • FL RETROGRADE PYELOGRAM  12/05/2019   • INGUINAL HERNIA REPAIR Bilateral 2015   • IR DRAINAGE TUBE CHECK AND/OR REMOVAL  03/05/2021   • PHOTODYNAMIC THERAPY      For Barretts esophagus   • LA COLONOSCOPY FLX DX W/COLLJ SPEC WHEN PFRMD N/A 04/25/2016    Procedure: COLONOSCOPY;  Surgeon: Adeel Carr MD;  Location: [facility]; Service: Gastroenterology   • LA CYSTO W/REMOVAL OF LESIONS SMALL N/A 12/05/2019    Procedure: CYSTO W/TURBT AND TRANSURETHRAL PROSTATE BIOPSY AND OPENING OF BLADDER NECK CONTRACTURE, B/L Retrograde pyelogram;  Surgeon: Mitch Granados MD;  Location: [facility];  Service: Urology   • LA CYSTOURETHROSCOPY W/DEST &/RMVL MED BLADDER TY N/A 04/16/2020    Procedure: CYSTOSCOPY, TRANSURETHRAL RESECTION OF BLADDER TUMOR (TURBT);   Surgeon: Mitch Granados MD;  [facility];  Service: Urology   • LA CYSTOURETHROSCOPY WITH BIOPSY N/A 09/10/2020    Procedure: CYSTOSCOPY, COLLECTION OF LEFT KIDNEY CYTOLOGY, BILATERAL RETROGRADE PYELOGRAM, BLADDER WALL BIOPSIES  AND FULGERAION, RANDOM BLADDER TUMOR BIOPSIES;  Surgeon: Mitch Granados MD;  Location: [facility] MAIN OR;  Service: Urology   • LA CYSTOURETHROSCOPY WITH BIOPSY N/A 04/05/2022    Procedure: urethroscopy , biopsy of urethra with fulgeration;  Surgeon: Mehnaz Gunn MD;  Location:  MAIN OR;  Service: Urology   • PROSTATE SURGERY     • TONSILLECTOMY     • TRANSURETHRAL RESECTION OF PROSTATE N/A 12/20/2018    Procedure: CYSTO, PHOTO SELECTIVE VAPORIZATION OF PROSTATE, B/L RETROGRADE PYELOGRAM, TURBT;  Surgeon: Mitch Granados MD;  [facility];  Service: Urology   • UMBILICAL HERNIA REPAIR  2012   • UPPER GASTROINTESTINAL ENDOSCOPY       Family History   Problem Relation Age of Onset   • Cancer Mother         Topical oral abrasian caused cancer   • Other Mother         Digestive System Complications   • Diabetes Father    • Heart disease Father    • Hypertension Father    • Coronary artery disease Father    • Hyperlipidemia Father       reports that he quit smoking about 51 years ago  His smoking use included cigarettes  He has a 10 00 pack-year smoking history  He has never used smokeless tobacco  He reports that he does not currently use alcohol after a past usage of about 2 0 standard drinks per week  He reports that he does not use drugs  Physical Exam:   Vitals:    01/12/23 0805   BP: 144/70   BP Location: Left arm   Patient Position: Sitting   Cuff Size: Standard   Pulse: 67   Weight: 81 8 kg (180 lb 6 4 oz)   Height: 5' 9" (1 753 m)     Body mass index is 26 64 kg/m²  Physical Exam  Vitals and nursing note reviewed  Constitutional:       Appearance: Normal appearance  HENT:      Head: Normocephalic and atraumatic  Nose: Nose normal       Mouth/Throat:      Mouth: Mucous membranes are moist       Pharynx: Oropharynx is clear  Eyes:      Extraocular Movements: Extraocular movements intact  Conjunctiva/sclera: Conjunctivae normal    Cardiovascular:      Rate and Rhythm: Normal rate and regular rhythm  Pulses: Normal pulses  Heart sounds: Normal heart sounds  Abdominal:      General: Bowel sounds are normal       Palpations: Abdomen is soft  Musculoskeletal:         General: Normal range of motion  Cervical back: Normal range of motion and neck supple  Skin:     General: Skin is warm and dry  Neurological:      General: No focal deficit present  Mental Status: He is alert and oriented to person, place, and time  Psychiatric:         Mood and Affect: Mood normal          Behavior: Behavior normal          Thought Content:  Thought content normal          Judgment: Judgment normal             Lab Results:  Results for orders placed or performed in visit on 01/10/23   Phosphorus   Result Value Ref Range    Phosphorus 3 7 2 3 - 4 1 mg/dL   Magnesium   Result Value Ref Range    Magnesium 2 6 1 6 - 2 6 mg/dL   CBC   Result Value Ref Range    WBC 10 81 (H) 4 31 - 10 16 Thousand/uL    RBC 3 93 3 88 - 5 62 Million/uL    Hemoglobin 12 6 12 0 - 17 0 g/dL    Hematocrit 40 3 36 5 - 49 3 %     (H) 82 - 98 fL    MCH 32 1 26 8 - 34 3 pg    MCHC 31 3 (L) 31 4 - 37 4 g/dL    RDW 16 1 (H) 11 6 - 15 1 %    Platelets 861 054 - 700 Thousands/uL    MPV 12 1 8 9 - 12 7 fL   Protein / creatinine ratio, urine   Result Value Ref Range    Creatinine, Ur 72 2 mg/dL    Protein Urine Random 39 mg/dL    Prot/Creat Ratio, Ur 0 54 (H) 0 00 - 0 10   PTH, intact   Result Value Ref Range     1 (H) 18 4 - 80 1 pg/mL   Urinalysis with microscopic   Result Value Ref Range    Color, UA Yellow     Clarity, UA Extra Turbid     Specific [place], UA 1 012 1 003 - 1 030    pH, UA 7 0 4 5, 5 0, 5 5, 6 0, 6 5, 7 0, 7 5, 8 0    Leukocytes, UA Large (A) Negative    Nitrite, UA Negative Negative    Protein, UA 30 (1+) (A) Negative mg/dl    Glucose, UA Negative Negative mg/dl    Ketones, UA Negative Negative mg/dl    Urobilinogen, UA 3 0 (A) <2 0 mg/dl mg/dl    Bilirubin, UA Negative Negative    Occult Blood, UA Negative Negative    RBC, UA None Seen None Seen, 1-2 /hpf    WBC, UA 4-10 (A) None Seen, 1-2 /hpf    Epithelial Cells None Seen None Seen, Occasional /hpf    Bacteria, UA Occasional None Seen, Occasional /hpf    Amorphous Crystals, UA Moderate    Vitamin D 25 hydroxy   Result Value Ref Range    Vit D, 25-Hydroxy 37 2 30 0 - 100 0 ng/mL   Comprehensive metabolic panel   Result Value Ref Range    Sodium 139 135 - 147 mmol/L    Potassium 4 7 3 5 - 5 3 mmol/L    Chloride 102 96 - 108 mmol/L    CO2 28 21 - 32 mmol/L    ANION GAP 9 4 - 13 mmol/L    BUN 74 (H) 5 - 25 mg/dL    Creatinine 3 29 (H) 0 60 - 1 30 mg/dL    Glucose, Fasting 170 (H) 65 - 99 mg/dL    Calcium 10 2 (H) 8 3 - 10 1 mg/dL    AST 15 5 - 45 U/L    ALT 19 12 - 78 U/L Alkaline Phosphatase 93 46 - 116 U/L    Total Protein 8 0 6 4 - 8 4 g/dL    Albumin 3 9 3 5 - 5 0 g/dL    Total Bilirubin 0 58 0 20 - 1 00 mg/dL    eGFR 16 ml/min/1 73sq m       Results from last 7 days   Lab Units 01/10/23  0921   WBC Thousand/uL 10 81*   HEMOGLOBIN g/dL 12 6   HEMATOCRIT % 40 3   PLATELETS Thousands/uL 242   SODIUM mmol/L 139   POTASSIUM mmol/L 4 7   CHLORIDE mmol/L 102   CO2 mmol/L 28   BUN mg/dL 74*   CREATININE mg/dL 3 29*   CALCIUM mg/dL 10 2*   MAGNESIUM mg/dL 2 6   PHOSPHORUS mg/dL 3 7           Portions of the record may have been created with voice recognition software  Occasional wrong word or "sound a like" substitutions may have occurred due to the inherent limitations of voice recognition software   Read the chart carefully and recognize,

## 2023-01-10 NOTE — PROGRESS NOTES
Name: Naun Mathis      : 1940      MRN: [id number]  Encounter Provider: Bernadine Case DO  Encounter Date: 1/10/2023   Encounter department: [address] [place] [address] INTERNAL MEDICINE    Assessment & Plan     1  Benign essential hypertension  Assessment & Plan:  Interestingly that despite vigorous diuresis 6 his blood pressure is improving  The fluid overload may have affected his cardiac function which seems to be improving  We will continue to monitor blood pressure readings and make adjustment to medication as needed  2  Type 2 diabetes mellitus with other circulatory complication, without long-term current use of insulin ([facility] [address] )  Assessment & Plan: With recent acute illness patient did have some decreased appetite  Apparently his appetite is improving  Continues to monitor blood sugars at home  Will call if any changes and we will make adjustments to treatment in the future as needed  Lab Results   Component Value Date    HGBA1C 6 5 (H) 10/25/2022         3  Anemia due to stage 4 chronic kidney disease [facility])  Assessment & Plan:  Patient is on medication by his hematologist to help with improvement with his anemia  Having a repeat CBC performed today  Will be followed closely      4  Bilateral lower extremity edema  Assessment & Plan:  Patient was felt to be in congestive heart failure causing significant bilateral edema complicated with his anemia  We did have concerns with the patient's kidney function and patient did have evaluation by both his cardiologist and nephrologist   Was placed on diuretics and seems to have responded well  Since the end of December he is lost approximately 11 pounds  Feeling less short of breath  Did go for lab testing today to recheck on his kidney function and hopefully the patient will remain stable  If he is tolerating treatment and is stable will consider decreasing his Demadex to once a day    Nephrologist feels that he can stop taking the [employer] 3 times a week and decreasing this to twice a week and again this may be stopped depending on the patient's condition  We are pleased overall with improvement and will be seen again in the office in approximately 4 weeks      5  Malignant neoplasm of overlapping sites of bladder [facility])  Assessment & Plan:  Extensive disease  To restart chemotherapy as per oncology in the near future  Continue to follow-up patient's blood counts including CBC, comprehensive metabolic profile             Subjective     Patient is an 66-year-old male history of extensive medical problems who is here today for follow-up  Accompanied by his wife  Patient has had a significant decrease in weight, significant diuresis with treatment as per nephrology and cardiology  States he is feeling better overall, less shortness of breath, sleeping through the night  He did have extensive lab testing performed today and will awaiting the results  His appetite is improved and he states he is sleeping through the night without difficulty    Review of Systems   Constitutional: Positive for activity change and appetite change  Negative for chills, diaphoresis, fatigue, fever and unexpected weight change  With the feeling improves slowly increasing activity level  Discussed with the patient the importance of using a cane with ambulation   HENT: Negative  Eyes: Negative  Respiratory: Negative  Cardiovascular: Negative  Significant decrease in edema to lower extremities   Gastrointestinal: Negative  Endocrine: Negative  Musculoskeletal: Negative  Skin: Negative  Allergic/Immunologic: Negative  Neurological: Negative  Hematological: Negative  Psychiatric/Behavioral: Negative          Past Medical History:   Diagnosis Date   • Acute kidney injury [facility])    • Arthritis     Hands   • Wright esophagus    • BPH with obstruction/lower urinary tract symptoms    • CAD (coronary artery disease)     Last assessed 09/16/15   • Cancer ([facility] )     bladder   • Cataract, acquired     Last assessed 10/10/17   • Chronic kidney disease    • Coronary artery disease    • Diabetes mellitus ([facility] )     NIDDM   • Diabetic neuropathy ([facility] )     Feet   • Enlarged prostate with lower urinary tract symptoms (LUTS)     Last assessed 10/10/17   • Erectile dysfunction    • GERD (gastroesophageal reflux disease)     Last assessed 10/10/17   • Hemoptysis     Last assessed 03/14/16   • Hypercholesterolemia     Last assessed 10/10/17   • Hypertension     Last assessed 10/10/17   • Macular degeneration     Right eye is particularly affected-peripheral vision intact   • Myocardial infarction [facility]) 1998   • OAB (overactive bladder)    • Testicular hypofunction    • Testicular hypogonadism     Last assessed 10/10/17   • Tinnitus    • Umbilical hernia     Last assessed 06/18/14   • Urge incontinence of urine    • Wears glasses      Past Surgical History:   Procedure Laterality Date   • COLONOSCOPY     • CORONARY ARTERY BYPASS GRAFT  07/16/2014    ABG x 4 LIMA to LAD,SVG to diagnoal 2 SVG to OM-1, SVG to PDA, resection of partial plerual mass   • CT GUIDED PERC DRAINAGE CATHETER PLACEMENT  2/19/2021   • CYSTOSCOPY  2013   • CYSTOSCOPY  02/08/2022    [place]   • ESOPHAGOGASTRODUODENOSCOPY     • FL RETROGRADE PYELOGRAM  12/20/2018   • FL RETROGRADE PYELOGRAM  12/5/2019   • INGUINAL HERNIA REPAIR Bilateral 2015   • IR DRAINAGE TUBE CHECK AND/OR REMOVAL  3/5/2021   • PHOTODYNAMIC THERAPY      For Barretts esophagus   • KS COLONOSCOPY FLX DX W/COLLJ SPEC WHEN PFRMD N/A 4/25/2016    Procedure: COLONOSCOPY;  Surgeon: Felicia Aden MD;  Location: [facility];   Service: Gastroenterology   • KS CYSTO W/REMOVAL OF LESIONS SMALL N/A 12/5/2019    Procedure: CYSTO W/TURBT AND TRANSURETHRAL PROSTATE BIOPSY AND OPENING OF BLADDER NECK CONTRACTURE, B/L Retrograde pyelogram;  Surgeon: Roseline Perez MD;  Location: [facility];  Service: Urology   • KS CYSTOURETHROSCOPY W/DEST &/RMVL MED BLADDER TY N/A 2020    Procedure: CYSTOSCOPY, TRANSURETHRAL RESECTION OF BLADDER TUMOR (TURBT);   Surgeon: Roxane Workman MD;  [facility];  Service: Urology   • FL CYSTOURETHROSCOPY WITH BIOPSY N/A 9/10/2020    Procedure: CYSTOSCOPY, COLLECTION OF LEFT KIDNEY CYTOLOGY, BILATERAL RETROGRADE PYELOGRAM, BLADDER WALL BIOPSIES  AND FULGERAION, RANDOM BLADDER TUMOR BIOPSIES;  Surgeon: Roxane Workman MD;  Location: [address] [place] [address] MAIN OR;  Service: Urology   • FL CYSTOURETHROSCOPY WITH BIOPSY N/A 2022    Procedure: urethroscopy , biopsy of urethra with fulgeration;  Surgeon: Laurie Manning MD;  Location:  MAIN OR;  Service: Urology   • TONSILLECTOMY     • TRANSURETHRAL RESECTION OF PROSTATE N/A 2018    Procedure: CYSTO, PHOTO SELECTIVE VAPORIZATION OF PROSTATE, B/L RETROGRADE PYELOGRAM, TURBT;  Surgeon: Roxane Workman MD;  [facility];  Service: Urology   • UMBILICAL HERNIA REPAIR     • UPPER GASTROINTESTINAL ENDOSCOPY       Family History   Problem Relation Age of Onset   • Cancer Mother    • Other Mother         Digestive System Complications   • Diabetes Father    • Heart disease Father    • Hypertension Father    • Coronary artery disease Father    • Hyperlipidemia Father      Social History     Socioeconomic History   • Marital status: /Civil Union     Spouse name: None   • Number of children: None   • Years of education: None   • Highest education level: None   Occupational History   • Occupation: Sales position   Tobacco Use   • Smoking status: Former     Packs/day: 1 00     Years: 13 00     Pack years: 13 00     Types: Cigarettes     Quit date:      Years since quittin 0   • Smokeless tobacco: Never   Vaping Use   • Vaping Use: Never used   Substance and Sexual Activity   • Alcohol use: Not Currently     Alcohol/week: 2 0 standard drinks     Types: 1 Glasses of wine, 1 Cans of beer per week   • Drug use: Never   • Sexual activity: Not Currently Other Topics Concern   • None   Social History Narrative    Always uses seatbelt        Caffeine use- Drinks 2 cups of coffee daily     Social Determinants of Health     Financial Resource Strain: Low Risk    • Difficulty of Paying Living Expenses: Not very hard   Food Insecurity: Not on file   Transportation Needs: No Transportation Needs   • Lack of Transportation (Medical): No   • Lack of Transportation (Non-Medical): No   Physical Activity: Not on file   Stress: Not on file   Social Connections: Not on file   Intimate Partner Violence: Not on file   Housing Stability: Not on file     Current Outpatient Medications on File Prior to Visit   Medication Sig   • atorvastatin (LIPITOR) 40 mg tablet TAKE ONE TABLET BY MOUTH AT BEDTIME   • Blood Glucose Monitoring Suppl (OneTouch Verio Flex System) w/Device KIT USE TO TEST BLOOD GLUCOSE DAILY   • Cholecalciferol (VITAMIN D) 50 MCG (2000 UT) tablet Take 2,000 Units by mouth daily   • citalopram (CeleXA) 20 mg tablet TAKE ONE TABLET BY MOUTH EVERY DAY   • Farxiga 5 MG TABS TAKE ONE TABLET BY MOUTH EVERY DAY   • ferrous sulfate 324 (65 Fe) mg Take 1 tablet (324 mg total) by mouth daily   • Lancets (OneTouch Delica Plus [id number]) MISC TEST BLOOD GLUCOSE ONCE DAILY   • losartan (Cozaar) 50 mg tablet Take 1 tablet (50 mg total) by mouth daily   • magnesium oxide (MAG-OX) 400 mg Take 1 tablet (400 mg total) by mouth in the morning  • metolazone (ZAROXOLYN) 5 mg tablet Take 1 tablet (5 mg total) by mouth 3 (three) times a week   • metoprolol tartrate (LOPRESSOR) 25 mg tablet Take 1 tablet (25 mg total) by mouth every 12 (twelve) hours   • Multiple Vitamins-Minerals (OCUVITE-LUTEIN PO) Take 1 tablet by mouth 2 (two) times a day Pt is taking preservision    • omeprazole (PriLOSEC) 40 MG capsule Take 1 capsule (40 mg total) by mouth in the morning     • ondansetron (ZOFRAN) 4 mg tablet Take 1 tablet (4 mg total) by mouth every 8 (eight) hours as needed for nausea or vomiting • OneTouch Verio test strip TEST ONCE A DAY   • sodium bicarbonate 650 mg tablet Take 2 tablets (1,300 mg total) by mouth 3 (three) times a day   • torsemide (DEMADEX) 20 mg tablet Take 1 tablet (20 mg total) by mouth 2 (two) times a day as needed (Shortness of breath)   • traMADol (ULTRAM) 50 mg tablet TAKE ONE TABLET BY MOUTH EVERY 6 HOURS AS NEEDED FOR MODERATE PAIN   • vitamin E, tocopherol, 400 units capsule Take 400 Units by mouth daily     Allergies   Allergen Reactions   • Fentanyl Hallucinations   • Morphine And Related Other (See Comments)     While having an MI patient received morphine and had adverse reaction but doesn't know what happened  Immunization History   Administered Date(s) Administered   • COVID-19 MODERNA VACC 0 5 ML IM 01/18/2021, 02/15/2021   • Influenza Split High Dose Preservative Free IM 10/10/2013, 09/22/2014, 10/11/2016   • Influenza, high dose seasonal 0 7 mL 01/22/2019, 10/08/2019, 09/03/2020, 11/23/2021, 11/29/2022   • Pneumococcal Conjugate 13-Valent 05/23/2019   • Pneumococcal Polysaccharide PPV23 03/11/2014       Objective     /64 (BP Location: Left arm, Patient Position: Sitting, Cuff Size: Adult)   Pulse 69   Temp (!) 96 8 °F (36 °C) (Tympanic)   Resp 19   Ht 5' 9" (1 753 m)   Wt 78 9 kg (174 lb)   SpO2 98%   BMI 25 70 kg/m²     Physical Exam  Vitals and nursing note reviewed  Constitutional:       General: He is not in acute distress  Appearance: Normal appearance  He is not ill-appearing, toxic-appearing or diaphoretic  Comments: Pleasant, cheerful 45-year-old male who is awake alert  In no acute distress  Accompanied by his wife  Some unsteadiness with ambulation and not using his cane   HENT:      Head: Normocephalic and atraumatic  Right Ear: Tympanic membrane, ear canal and external ear normal  There is no impacted cerumen  Left Ear: Tympanic membrane, ear canal and external ear normal  There is no impacted cerumen        Nose: Nose normal  No congestion or rhinorrhea  Mouth/Throat:      Mouth: Mucous membranes are moist       Pharynx: Oropharynx is clear  No oropharyngeal exudate or posterior oropharyngeal erythema  Eyes:      General: No scleral icterus  Right eye: No discharge  Left eye: No discharge  Extraocular Movements: Extraocular movements intact  Conjunctiva/sclera: Conjunctivae normal       Pupils: Pupils are equal, round, and reactive to light  Neck:      Vascular: No carotid bruit  Cardiovascular:      Rate and Rhythm: Normal rate and regular rhythm  Heart sounds: No murmur heard  No friction rub  No gallop  Pulmonary:      Effort: Pulmonary effort is normal  No respiratory distress  Breath sounds: Normal breath sounds  No stridor  No wheezing, rhonchi or rales  Chest:      Chest wall: No tenderness  Abdominal:      General: Bowel sounds are normal  There is no distension  Palpations: Abdomen is soft  There is no mass  Tenderness: There is no abdominal tenderness  There is no right CVA tenderness, left CVA tenderness, guarding or rebound  Hernia: No hernia is present  Musculoskeletal:         General: No swelling, tenderness, deformity or signs of injury  Normal range of motion  Cervical back: Normal range of motion and neck supple  No rigidity or tenderness  Right lower leg: No edema  Left lower leg: No edema  Comments: Complete resolution of edema to lower extremities bilaterally  Lymphadenopathy:      Cervical: No cervical adenopathy  Skin:     General: Skin is warm and dry  Coloration: Skin is pale  Skin is not jaundiced  Findings: No bruising, erythema, lesion or rash  Neurological:      Mental Status: He is alert  Mental status is at baseline  Cranial Nerves: No cranial nerve deficit  Sensory: Sensory deficit present  Motor: Weakness present        Coordination: Coordination abnormal       Gait: Gait abnormal       Deep Tendon Reflexes: Reflexes abnormal       Comments: Significant peripheral neuropathy which we feel is the major cause of problems with ambulation, unsteady gait  Again reminded the importance of using a cane to prevent falls  Absent patellar and Achilles tendon reflexes bilaterally  Patient has some improvement with muscle tone and strength   Psychiatric:         Mood and Affect: Mood normal          Behavior: Behavior normal          Thought Content:  Thought content normal          Judgment: Judgment normal        Ted Swanson, DO

## 2023-01-10 NOTE — TELEPHONE ENCOUNTER
Not able to reach patient, left message to call back to discuss about his volume status so that diuretics can be adjusted as renal function has been worsening creatinine has trended up to 3 29 and calcium also trended up to 10 2, would consider decreasing the dose of metolazone to twice a week  -As per PCP note since December patient has lost approximately 11 pounds

## 2023-01-10 NOTE — ASSESSMENT & PLAN NOTE
Patient was felt to be in congestive heart failure causing significant bilateral edema complicated with his anemia  We did have concerns with the patient's kidney function and patient did have evaluation by both his cardiologist and nephrologist   Was placed on diuretics and seems to have responded well  Since the end of December he is lost approximately 11 pounds  Feeling less short of breath  Did go for lab testing today to recheck on his kidney function and hopefully the patient will remain stable  If he is tolerating treatment and is stable will consider decreasing his Demadex to once a day  Nephrologist feels that he can stop taking the Zaroxolyn 3 times a week and decreasing this to twice a week and again this may be stopped depending on the patient's condition    We are pleased overall with improvement and will be seen again in the office in approximately 4 weeks

## 2023-01-11 ENCOUNTER — TELEPHONE (OUTPATIENT)
Dept: NEPHROLOGY | Facility: CLINIC | Age: 83
End: 2023-01-11

## 2023-01-11 PROBLEM — I50.23 ACUTE ON CHRONIC SYSTOLIC CONGESTIVE HEART FAILURE (HCC): Status: ACTIVE | Noted: 2023-01-11

## 2023-01-11 PROBLEM — R60.0 BILATERAL LOWER EXTREMITY EDEMA: Status: RESOLVED | Noted: 2022-12-20 | Resolved: 2023-01-11

## 2023-01-11 NOTE — PROGRESS NOTES
Cardiology Follow Up    [address] [place] [address]  1940  [id number]  [facility] [id number] [phone]    1  Coronary artery disease involving native coronary artery of native heart without angina pectoris        2  Ischemic cardiomyopathy  Ambulatory Referral to Cardiology    Basic metabolic panel      3  RBBB        4  Stage 4 chronic kidney disease ([facility] [address] )        5  Acute on chronic systolic congestive heart failure (HCC)              Discussion/Summary:     1  He was advised to hold his Zaroxolyn and Torsemide until I call him on 1/12 or 1/13  2  Repeat BMP 1/12/2023  3  He will continue to weigh himself daily  4  I suspect his dry weight is around 177 - 180 lbs  Based on his blood work on 1/12 and his daily weights, I will give him a diuretic regimen when I call him later this week  5  Next office visit in 2 months  Interval History: With aggressive diuresis, his weight is back near baseline  His LE edema has resolved and he denies CP, SOB  Creatinine today is up to 3 29, baseline is around 2 25  He has an ICM with EF around 45%, PASP 58 mmHg  His weight at home today is 174 8     /68    He will be seeing renal on 1/12/2023  He is presently taking Zaroxolyn 5 mg 3x a week and Torsemide 40 mg daily      Patient Active Problem List   Diagnosis   • Arteriosclerotic cardiovascular disease   • Backache   • Benign essential hypertension   • DMII (diabetes mellitus, type 2) ([facility] [address] )   • Hyperlipidemia   • Wright's esophagus with esophagitis   • Acute kidney injury ([facility] )   • Overactive bladder   • Bladder tumor   • Type 2 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 ([facility] )   • Hx of CABG   • Malignant neoplasm of overlapping sites of bladder 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)   • Liver function study, abnormal   • Elevated troponin   • Forearm mass, left   • Fungal dermatitis   • Anxiety and depression   • Overweight   • Ambulatory dysfunction   • Frequent falls   • Right sided Pelvic abscess in male [facility])   • Severe protein-calorie malnutrition (HCC)   • Metabolic acidosis   • Hypomagnesemia   • RBBB   • Benign hypertension with chronic kidney disease, stage IV (HCC)   • Persistent proteinuria   • Anemia   • Chronic kidney disease-mineral and bone disorder   • Visual hallucinations   • Functional diarrhea   • Platelets decreased (HCC)   • Urethral tumor   • Secondary hyperparathyroidism of renal origin ([facility] [address] )   • Stage 4 chronic kidney disease (HCC)   • Anemia due to stage 4 chronic kidney disease (HCC)   • Shortness of breath   • Acute on chronic systolic congestive heart failure (HCC)     Past Medical History:   Diagnosis Date   • Acute kidney injury ([facility] )    • Arthritis     Hands   • Wright esophagus    • BPH with obstruction/lower urinary tract symptoms    • CAD (coronary artery disease)     Last assessed 09/16/15   • Cancer ([facility] )     bladder   • Cataract, acquired     Last assessed 10/10/17   • Chronic kidney disease    • Coronary artery disease    • Diabetes mellitus (HCC)     NIDDM   • Diabetic neuropathy (HCC)     Feet   • Enlarged prostate with lower urinary tract symptoms (LUTS)     Last assessed 10/10/17   • Erectile dysfunction    • GERD (gastroesophageal reflux disease)     Last assessed 10/10/17   • Hemoptysis     Last assessed 03/14/16   • Hypercholesterolemia     Last assessed 10/10/17   • Hypertension     Last assessed 10/10/17   • Macular degeneration     Right eye is particularly affected-peripheral vision intact   • Myocardial [facility])    • OAB (overactive bladder)    • Testicular hypofunction    • Testicular hypogonadism     Last assessed 10/10/17   • Tinnitus    • Umbilical hernia     Last assessed 14   • Urge incontinence of urine    • Wears glasses      Social History     Socioeconomic History   • Marital status: /Civil Union     Spouse name: Not on file   • Number of children: Not on file   • Years of education: Not on file   • Highest education level: Not on file   Occupational History   • Occupation: Sales position   Tobacco Use   • Smoking status: Former     Packs/day: 1      Years:      Pack years:      Types: Cigarettes     Quit date:      Years since quittin 0   • Smokeless tobacco: Never   Vaping Use   • Vaping Use: Never used   Substance and Sexual Activity   • Alcohol use: Not Currently     Alcohol/week: 2 0 standard drinks     Types: 1 Glasses of wine, 1 Cans of beer per week   • Drug use: Never   • Sexual activity: Not Currently   Other Topics Concern   • Not on file   Social History Narrative    Always uses seatbelt        Caffeine use- Drinks 2 cups of coffee daily     Social Determinants of Health     Financial Resource Strain: Low Risk    • Difficulty of Paying Living Expenses: Not very hard   Food Insecurity: Not on file   Transportation Needs: No Transportation Needs   • Lack of Transportation (Medical): No   • Lack of Transportation (Non-Medical):  No   Physical Activity: Not on file   Stress: Not on file   Social Connections: Not on file   Intimate Partner Violence: Not on file   Housing Stability: Not on file      Family History   Problem Relation Age of Onset   • Cancer Mother    • Other Mother         Digestive System Complications   • Diabetes Father    • Heart disease Father    • Hypertension Father    • Coronary artery disease Father    • Hyperlipidemia Father      Past Surgical History:   Procedure Laterality Date   • COLONOSCOPY     • CORONARY ARTERY BYPASS GRAFT 07/16/2014    ABG x 4 LIMA to LAD,SVG to diagnoal 2 SVG to OM-1, SVG to PDA, resection of partial plerual mass   • CT GUIDED PERC DRAINAGE CATHETER PLACEMENT  2/19/2021   • CYSTOSCOPY  2013   • CYSTOSCOPY  02/08/2022    Olya   • ESOPHAGOGASTRODUODENOSCOPY     • FL RETROGRADE PYELOGRAM  12/20/2018   • FL RETROGRADE PYELOGRAM  12/5/2019   • INGUINAL HERNIA REPAIR Bilateral 2015   • IR DRAINAGE TUBE CHECK AND/OR REMOVAL  3/5/2021   • PHOTODYNAMIC THERAPY      For Barretts esophagus   • IA COLONOSCOPY FLX DX W/COLLJ SPEC WHEN PFRMD N/A 4/25/2016    Procedure: COLONOSCOPY;  Surgeon: Swathi Hendrix MD;  Location:  GI LAB; Service: Gastroenterology   • IA CYSTO W/REMOVAL OF LESIONS SMALL N/A 12/5/2019    Procedure: CYSTO W/TURBT AND TRANSURETHRAL PROSTATE BIOPSY AND OPENING OF BLADDER NECK CONTRACTURE, B/L Retrograde pyelogram;  Surgeon: Fortunato Nunez MD;  Location: [facility];  Service: Urology   • IA CYSTOURETHROSCOPY W/DEST &/RMVL MED BLADDER TY N/A 4/16/2020    Procedure: CYSTOSCOPY, TRANSURETHRAL RESECTION OF BLADDER TUMOR (TURBT);   Surgeon: Fortunato Nunez MD;  Location[facility];  Service: Urology   • IA CYSTOURETHROSCOPY WITH BIOPSY N/A 9/10/2020    Procedure: CYSTOSCOPY, COLLECTION OF LEFT KIDNEY CYTOLOGY, BILATERAL RETROGRADE PYELOGRAM, BLADDER WALL BIOPSIES  AND FULGERAION, RANDOM BLADDER TUMOR BIOPSIES;  Surgeon: Fortunato Nunez MD;  Location: [address] [place] [address] MAIN OR;  Service: Urology   • IA CYSTOURETHROSCOPY WITH BIOPSY N/A 4/5/2022    Procedure: urethroscopy , biopsy of urethra with fulgeration;  Surgeon: Martina Olivo MD;  Location:  MAIN OR;  Service: Urology   • TONSILLECTOMY     • TRANSURETHRAL RESECTION OF PROSTATE N/A 12/20/2018    Procedure: CYSTO, PHOTO SELECTIVE VAPORIZATION OF PROSTATE, B/L RETROGRADE PYELOGRAM, TURBT;  Surgeon: Fortunato Nunez MD;  Location: [facility];  Service: Urology   • UMBILICAL HERNIA REPAIR  2012   • UPPER GASTROINTESTINAL ENDOSCOPY         Current Outpatient Medications:   •  atorvastatin (LIPITOR) 40 mg tablet, TAKE ONE TABLET BY MOUTH AT BEDTIME, Disp: 90 tablet, Rfl: 3  •  Blood Glucose Monitoring Suppl (OneTouch Verio Flex System) w/Device KIT, USE TO TEST BLOOD GLUCOSE DAILY, Disp: 1 kit, Rfl: 0  •  Cholecalciferol (VITAMIN D) 50 MCG (2000 UT) tablet, Take 2,000 Units by mouth daily, Disp: , Rfl:   •  citalopram (CeleXA) 20 mg tablet, TAKE ONE TABLET BY MOUTH EVERY DAY, Disp: 30 tablet, Rfl: 3  •  Farxiga 5 MG TABS, TAKE ONE TABLET BY MOUTH EVERY DAY, Disp: 90 tablet, Rfl: 2  •  ferrous sulfate 324 (65 Fe) mg, Take 1 tablet (324 mg total) by mouth daily, Disp: 30 tablet, Rfl: 5  •  Lancets (OneTouch Delica Plus [id number]) MISC, TEST BLOOD GLUCOSE ONCE DAILY, Disp: 100 each, Rfl: 0  •  losartan (Cozaar) 50 mg tablet, Take 1 tablet (50 mg total) by mouth daily, Disp: 30 tablet, Rfl: 5  •  magnesium oxide (MAG-OX) 400 mg, Take 1 tablet (400 mg total) by mouth in the morning , Disp: 180 tablet, Rfl: 2  •  metolazone (ZAROXOLYN) 5 mg tablet, Take 1 tablet (5 mg total) by mouth 3 (three) times a week, Disp: 36 tablet, Rfl: 3  •  metoprolol tartrate (LOPRESSOR) 25 mg tablet, Take 1 tablet (25 mg total) by mouth every 12 (twelve) hours, Disp: 180 tablet, Rfl: 3  •  Multiple Vitamins-Minerals (OCUVITE-LUTEIN PO), Take 1 tablet by mouth 2 (two) times a day Pt is taking preservision , Disp: , Rfl:   •  omeprazole (PriLOSEC) 40 MG capsule, Take 1 capsule (40 mg total) by mouth in the morning , Disp: 90 capsule, Rfl: 3  •  ondansetron (ZOFRAN) 4 mg tablet, Take 1 tablet (4 mg total) by mouth every 8 (eight) hours as needed for nausea or vomiting, Disp: 20 tablet, Rfl: 1  •  OneTouch Verio test strip, TEST ONCE A DAY, Disp: 100 strip, Rfl: 0  •  sodium bicarbonate 650 mg tablet, Take 2 tablets (1,300 mg total) by mouth 3 (three) times a day, Disp: 360 tablet, Rfl: 3  •  torsemide (DEMADEX) 20 mg tablet, Take 1 tablet (20 mg total) by mouth 2 (two) times a day as needed (Shortness of breath), Disp: 60 tablet, Rfl: 3  •  traMADol (ULTRAM) 50 mg tablet, TAKE ONE TABLET BY MOUTH EVERY 6 HOURS AS NEEDED FOR MODERATE PAIN, Disp: 20 tablet, Rfl: 0  •  vitamin E, tocopherol, 400 units capsule, Take 400 Units by mouth daily, Disp: , Rfl:   Allergies   Allergen Reactions   • Fentanyl Hallucinations   • Morphine And Related Other (See Comments)     While having an MI patient received morphine and had adverse reaction but doesn't know what happened  Vitals:    01/10/23 1619   BP: 150/68   BP Location: Left arm   Patient Position: Sitting   Cuff Size: Standard   Pulse: 59   SpO2: 100%   Weight: 82 3 kg (181 lb 6 4 oz)     Weight (last 2 days)     Date/Time Weight    01/10/23 1619 82 3 (181 4)         Blood pressure 150/68, pulse 59, weight 82 3 kg (181 lb 6 4 oz), SpO2 100 %  , Body mass index is 26 79 kg/m²      Labs:  Lab on 01/10/2023   Component Date Value   • Phosphorus 01/10/2023 3 7    • Magnesium 01/10/2023 2 6    • WBC 01/10/2023 10 81 (H)    • RBC 01/10/2023 3 93    • Hemoglobin 01/10/2023 12 6    • Hematocrit 01/10/2023 40 3    • MCV 01/10/2023 103 (H)    • MCH 01/10/2023 32 1    • MCHC 01/10/2023 31 3 (L)    • RDW 01/10/2023 16 1 (H)    • Platelets 90/62/8807 242    • MPV 01/10/2023 12 1    • Creatinine, Ur 01/10/2023 72 2    • Protein Urine Random 01/10/2023 39    • Prot/Creat Ratio, Ur 01/10/2023 0 54 (H)    • PTH 01/10/2023 234 1 (H)    • Color, UA 01/10/2023 Yellow    • Clarity, UA 01/10/2023 Extra Turbid    • Specific Gravity, UA 01/10/2023 1 012    • pH, UA 01/10/2023 7 0    • Leukocytes, UA 01/10/2023 Large (A)    • Nitrite, UA 01/10/2023 Negative    • Protein, UA 01/10/2023 30 (1+) (A)    • Glucose, UA 01/10/2023 Negative    • Ketones, UA 01/10/2023 Negative    • Urobilinogen, UA 01/10/2023 3 0 (A)    • Bilirubin, UA 01/10/2023 Negative    • Occult Blood, UA 01/10/2023 Negative    • RBC, UA 01/10/2023 None Seen    • WBC, UA 01/10/2023 4-10 (A)    • Epithelial Cells 01/10/2023 None Seen    • Bacteria, UA 01/10/2023 Occasional    • Amorphous Crystals, UA 01/10/2023 Moderate    • Vit D, 25-Hydroxy 01/10/2023 37 2    • Sodium 01/10/2023 139    • Potassium 01/10/2023 4 7    • Chloride 01/10/2023 102    • CO2 01/10/2023 28    • ANION GAP 01/10/2023 9    • BUN 01/10/2023 74 (H)    • Creatinine 01/10/2023 3 29 (H)    • Glucose, Fasting 01/10/2023 170 (H)    • Calcium 01/10/2023 10 2 (H)    • AST 01/10/2023 15    • ALT 01/10/2023 19    • Alkaline Phosphatase 01/10/2023 93    • Total Protein 01/10/2023 8 0    • Albumin 01/10/2023 3 9    • Total Bilirubin 01/10/2023 0 58    • eGFR 01/10/2023 16    Lab on 12/27/2022   Component Date Value   • Sodium 12/27/2022 138    • Potassium 12/27/2022 3 8    • Chloride 12/27/2022 105    • CO2 12/27/2022 28    • ANION GAP 12/27/2022 5    • BUN 12/27/2022 48 (H)    • Creatinine 12/27/2022 2 61 (H)    • Glucose, Fasting 12/27/2022 142 (H)    • Calcium 12/27/2022 9 0    • Corrected Calcium 12/27/2022 9 6    • AST 12/27/2022 20    • ALT 12/27/2022 22    • Alkaline Phosphatase 12/27/2022 90    • Total Protein 12/27/2022 7 1    • Albumin 12/27/2022 3 2 (L)    • Total Bilirubin 12/27/2022 0 64    • eGFR 12/27/2022 21    • WBC 12/27/2022 7 82    • RBC 12/27/2022 3 15 (L)    • Hemoglobin 12/27/2022 10 4 (L)    • Hematocrit 12/27/2022 33 9 (L)    • MCV 12/27/2022 108 (H)    • MCH 12/27/2022 33 0    • MCHC 12/27/2022 30 7 (L)    • RDW 12/27/2022 20 0 (H)    • MPV 12/27/2022 11 4    • Platelets 09/28/3997 194    • nRBC 12/27/2022 0    • Neutrophils Relative 12/27/2022 64    • Immat GRANS % 12/27/2022 1    • Lymphocytes Relative 12/27/2022 16    • Monocytes Relative 12/27/2022 16 (H)    • Eosinophils Relative 12/27/2022 2    • Basophils Relative 12/27/2022 1    • Neutrophils Absolute 12/27/2022 5 05    • Immature Grans Absolute 12/27/2022 0 04    • Lymphocytes Absolute 12/27/2022 1 26    • Monocytes Absolute 12/27/2022 1 25 (H)    • Eosinophils Absolute 12/27/2022 0 17    • Basophils Absolute 12/27/2022 0 05    Appointment on 12/19/2022   Component Date Value   • Sodium 12/19/2022 138    • Potassium 12/19/2022 4 4    • Chloride 12/19/2022 111 (H)    • CO2 12/19/2022 18 (L)    • ANION GAP 12/19/2022 9    • BUN 12/19/2022 48 (H)    • Creatinine 12/19/2022 2 67 (H)    • Glucose, Fasting 12/19/2022 179 (H)    • Calcium 12/19/2022 9 0    • eGFR 12/19/2022 21    • WBC 12/19/2022 9 61    • RBC 12/19/2022 2 79 (L)    • Hemoglobin 12/19/2022 9 4 (L)    • Hematocrit 12/19/2022 31 3 (L)    • MCV 12/19/2022 112 (H)    • MCH 12/19/2022 33 7    • MCHC 12/19/2022 30 0 (L)    • RDW 12/19/2022 25 6 (H)    • Platelets 45/30/6050 234    • MPV 12/19/2022 11 6    • Iron Saturation 12/19/2022 17 (L)    • TIBC 12/19/2022 312    • Iron 12/19/2022 54 (L)    • Ferritin 12/19/2022 423 (H)    Appointment on 12/06/2022   Component Date Value   • WBC 12/06/2022 5 21    • RBC 12/06/2022 2 62 (L)    • Hemoglobin 12/06/2022 8 3 (L)    • Hematocrit 12/06/2022 27 0 (L)    • MCV 12/06/2022 103 (H)    • MCH 12/06/2022 31 7    • MCHC 12/06/2022 30 7 (L)    • RDW 12/06/2022 22 2 (H)    • MPV 12/06/2022 11 8    • Platelets 10/90/6002 326    • nRBC 12/06/2022 1    • Neutrophils Relative 12/06/2022 86 (H)    • Immat GRANS % 12/06/2022 2    • Lymphocytes Relative 12/06/2022 7 (L)    • Monocytes Relative 12/06/2022 4    • Eosinophils Relative 12/06/2022 1    • Basophils Relative 12/06/2022 0    • Neutrophils Absolute 12/06/2022 4 53    • Immature Grans Absolute 12/06/2022 0 09    • Lymphocytes Absolute 12/06/2022 0 37 (L)    • Monocytes Absolute 12/06/2022 0 18    • Eosinophils Absolute 12/06/2022 0 03    • Basophils Absolute 12/06/2022 0 01    Hospital Outpatient Visit on 12/02/2022   Component Date Value   • AV area peak manuel 12/02/2022 2 3    • LA size 12/02/2022 4    • Aortic valve mean veloci* 12/02/2022 9 10    • Triscuspid Valve Regurgi* 12/02/2022 53 0    • Tricuspid valve peak reg* 12/02/2022 3 65    • LVPWd 12/02/2022 1 00    • Left Atrium Area-systoli* 12/02/2022 34 1    • Left Atrium Area-systoli* 12/02/2022 28 7    • MV E' Tissue Velocity Se* 12/02/2022 7    • TR Peak Cain 12/02/2022 3 7    • IVSd 12/02/2022 4 26    • LV DIASTOLIC VOLUME (MOD* 06/11/8607 95    • LEFT VENTRICLE SYSTOLIC * 06/39/6889 50    • Left ventricular stroke * 12/02/2022 45 00    • EF 12/02/2022 58    • A4C EF 12/02/2022 52    • LA length (A2C) 12/02/2022 7 20    • LVIDd 12/02/2022 4 50    • IVS 12/02/2022 1 2    • LVIDS 12/02/2022 3 50    • FS 12/02/2022 22    • Asc Ao 12/02/2022 3 2    • Ao root 12/02/2022 3 80    • RVID d 12/02/2022 3 8    • LVOT mn grad 12/02/2022 2 0    • AV area by cont VTI 12/02/2022 2 6    • AV mean gradient 12/02/2022 4    • AV LVOT peak gradient 12/02/2022 3    • MV valve area p 1/2 meth* 12/02/2022 6 88    • E wave deceleration time 12/02/2022 111    • LVOT diameter 12/02/2022 2 2    • LVOT peak cain 12/02/2022 0 86    • LVOT peak VTI 12/02/2022 19 7    • Aortic valve peak veloci* 12/02/2022 1 42    • Ao VTI 12/02/2022 28 49    • LVOT stroke volume 12/02/2022 74 85    • AV peak gradient 12/02/2022 8    • MV Peak E Cain 12/02/2022 112    • MV Peak A Cain 12/02/2022 0 4    • LV Systolic Volume (BP) 08/44/6038 68    • LV Diastolic Volume (BP) 49/28/8848 163    • RAA A4C 12/02/2022 19 7    • MV stenosis pressure 1/2* 12/02/2022 32    • LVOT stroke volume index 12/02/2022 35 10    • LVSV, 2D 12/02/2022 45    • LVOT area 12/02/2022 3 80    • Dimensionless velociy in* 12/02/2022 0 61    • AV valve area 12/02/2022 2 63    • GLS 12/02/2022 -12    • LV EF 12/02/2022 45    • Est  RA pres 12/02/2022 5 0    • Right Ventricular Peak S* 12/02/2022 58 00    Telephone on 11/30/2022   Component Date Value   • Severity 10/19/2022 Alert    • Right Eye Diabetic Retin* 10/19/2022 Positive    • Left Eye Diabetic Retino* 10/19/2022 Positive    Appointment on 11/29/2022   Component Date Value   • WBC 11/29/2022 6 40    • RBC 11/29/2022 2 53 (L) • Hemoglobin 11/29/2022 8 2 (L)    • Hematocrit 11/29/2022 26 0 (L)    • MCV 11/29/2022 103 (H)    • MCH 11/29/2022 32 4    • MCHC 11/29/2022 31 5    • RDW 11/29/2022 23 0 (H)    • MPV 11/29/2022 11 6    • Platelets 35/36/5857 132 (L)    • nRBC 11/29/2022 1    • Neutrophils Relative 11/29/2022 74    • Immat GRANS % 11/29/2022 1    • Lymphocytes Relative 11/29/2022 8 (L)    • Monocytes Relative 11/29/2022 16 (H)    • Eosinophils Relative 11/29/2022 1    • Basophils Relative 11/29/2022 0    • Neutrophils Absolute 11/29/2022 4 71    • Immature Grans Absolute 11/29/2022 0 09    • Lymphocytes Absolute 11/29/2022 0 50 (L)    • Monocytes Absolute 11/29/2022 1 05    • Eosinophils Absolute 11/29/2022 0 04    • Basophils Absolute 11/29/2022 0 01    • Sodium 11/29/2022 138    • Potassium 11/29/2022 4 3    • Chloride 11/29/2022 110 (H)    • CO2 11/29/2022 18 (L)    • ANION GAP 11/29/2022 10    • BUN 11/29/2022 49 (H)    • Creatinine 11/29/2022 2 86 (H)    • Glucose, Fasting 11/29/2022 169 (H)    • Calcium 11/29/2022 8 9    • Corrected Calcium 11/29/2022 9 5    • AST 11/29/2022 24    • ALT 11/29/2022 42    • Alkaline Phosphatase 11/29/2022 98    • Total Protein 11/29/2022 6 9    • Albumin 11/29/2022 3 2 (L)    • Total Bilirubin 11/29/2022 0 73    • eGFR 11/29/2022 19    Appointment on 11/15/2022   Component Date Value   • WBC 11/15/2022 6 23    • RBC 11/15/2022 2 97 (L)    • Hemoglobin 11/15/2022 9 3 (L)    • Hematocrit 11/15/2022 28 8 (L)    • MCV 11/15/2022 97    • MCH 11/15/2022 31 3    • MCHC 11/15/2022 32 3    • RDW 11/15/2022 18 6 (H)    • MPV 11/15/2022 11 2    • Platelets 12/69/4881 258    • nRBC 11/15/2022 0    • Neutrophils Relative 11/15/2022 82 (H)    • Immat GRANS % 11/15/2022 1    • Lymphocytes Relative 11/15/2022 10 (L)    • Monocytes Relative 11/15/2022 4    • Eosinophils Relative 11/15/2022 2    • Basophils Relative 11/15/2022 1    • Neutrophils Absolute 11/15/2022 5 14    • Immature Grans Absolute 11/15/2022 0 04    • Lymphocytes Absolute 11/15/2022 0 61    • Monocytes Absolute 11/15/2022 0 26    • Eosinophils Absolute 11/15/2022 0 15    • Basophils Absolute 11/15/2022 0 03    Appointment on 11/08/2022   Component Date Value   • WBC 11/08/2022 6 70    • RBC 11/08/2022 2 96 (L)    • Hemoglobin 11/08/2022 9 3 (L)    • Hematocrit 11/08/2022 28 7 (L)    • MCV 11/08/2022 97    • MCH 11/08/2022 31 4    • MCHC 11/08/2022 32 4    • RDW 11/08/2022 18 8 (H)    • MPV 11/08/2022 10 8    • Platelets 82/64/1058 95 (L)    • nRBC 11/08/2022 0    • Neutrophils Relative 11/08/2022 68    • Immat GRANS % 11/08/2022 2    • Lymphocytes Relative 11/08/2022 10 (L)    • Monocytes Relative 11/08/2022 19 (H)    • Eosinophils Relative 11/08/2022 1    • Basophils Relative 11/08/2022 0    • Neutrophils Absolute 11/08/2022 4 56    • Immature Grans Absolute 11/08/2022 0 14    • Lymphocytes Absolute 11/08/2022 0 68    • Monocytes Absolute 11/08/2022 1 27 (H)    • Eosinophils Absolute 11/08/2022 0 04    • Basophils Absolute 11/08/2022 0 01    • Sodium 11/08/2022 139    • Potassium 11/08/2022 4 8    • Chloride 11/08/2022 110 (H)    • CO2 11/08/2022 21    • ANION GAP 11/08/2022 8    • BUN 11/08/2022 40 (H)    • Creatinine 11/08/2022 2 45 (H)    • Glucose 11/08/2022 162 (H)    • Calcium 11/08/2022 8 8    • Corrected Calcium 11/08/2022 9 5    • AST 11/08/2022 30    • ALT 11/08/2022 62    • Alkaline Phosphatase 11/08/2022 97    • Total Protein 11/08/2022 6 9    • Albumin 11/08/2022 3 1 (L)    • Total Bilirubin 11/08/2022 0 48    • eGFR 11/08/2022 23    Appointment on 10/25/2022   Component Date Value   • Sodium 10/25/2022 137    • Potassium 10/25/2022 5 2    • Chloride 10/25/2022 111 (H)    • CO2 10/25/2022 22    • ANION GAP 10/25/2022 4    • BUN 10/25/2022 50 (H)    • Creatinine 10/25/2022 2 24 (H)    • Glucose, Fasting 10/25/2022 151 (H)    • Calcium 10/25/2022 9 5    • Corrected Calcium 10/25/2022 10 1    • AST 10/25/2022 74 (H)    • ALT 10/25/2022 105 (H)    • Alkaline Phosphatase 10/25/2022 97    • Total Protein 10/25/2022 7 3    • Albumin 10/25/2022 3 2 (L)    • Total Bilirubin 10/25/2022 0 57    • eGFR 10/25/2022 26    • WBC 10/25/2022 4 44    • RBC 10/25/2022 3 47 (L)    • Hemoglobin 10/25/2022 10 8 (L)    • Hematocrit 10/25/2022 33 2 (L)    • MCV 10/25/2022 96    • MCH 10/25/2022 31 1    • MCHC 10/25/2022 32 5    • RDW 10/25/2022 15 7 (H)    • MPV 10/25/2022 11 9    • Platelets 75/75/2846 425 (H)    • nRBC 10/25/2022 0    • Neutrophils Relative 10/25/2022 75    • Immat GRANS % 10/25/2022 1    • Lymphocytes Relative 10/25/2022 14    • Monocytes Relative 10/25/2022 5    • Eosinophils Relative 10/25/2022 4    • Basophils Relative 10/25/2022 1    • Neutrophils Absolute 10/25/2022 3 41    • Immature Grans Absolute 10/25/2022 0 02    • Lymphocytes Absolute 10/25/2022 0 61    • Monocytes Absolute 10/25/2022 0 21    • Eosinophils Absolute 10/25/2022 0 16    • Basophils Absolute 10/25/2022 0 03    • Cholesterol 10/25/2022 123    • Triglycerides 10/25/2022 80    • HDL, Direct 10/25/2022 61    • LDL Calculated 10/25/2022 46    • Non-HDL-Chol (CHOL-HDL) 10/25/2022 62    • Hemoglobin A1C 10/25/2022 6 5 (H)    • EAG 10/25/2022 140    There may be more visits with results that are not included  Imaging: XR chest pa & lateral    Result Date: 12/18/2022  Narrative: CHEST INDICATION:   R06 02: Shortness of breath  COMPARISON:  CXR 10/8/2021, abdomen CT 11/30/2022, chest CT 11/25/2020  EXAM PERFORMED/VIEWS:  XR CHEST PA & LATERAL FINDINGS: Mild cardiomegaly, CABG  Mild pulmonary venous congestion  Small effusions  Osseous structures appear within normal limits for patient age  Impression: Mild pulmonary venous congestion with small effusions  Workstation performed: [id number]       Review of Systems:  Review of Systems   Constitutional: Negative for diaphoresis, fatigue, fever and unexpected weight change  HENT: Negative      Respiratory: Negative for cough, shortness of breath and wheezing  Cardiovascular: Negative for chest pain, palpitations and leg swelling  Gastrointestinal: Negative for abdominal pain, diarrhea and nausea  Musculoskeletal: Negative for gait problem and myalgias  Skin: Negative for rash  Neurological: Negative for dizziness and numbness  Psychiatric/Behavioral: Negative  Physical Exam:  Physical Exam  Constitutional:       Appearance: He is well-developed  HENT:      Head: Normocephalic and atraumatic  Eyes:      Pupils: Pupils are equal, round, and reactive to light  Neck:      Vascular: No JVD  Cardiovascular:      Rate and Rhythm: Regular rhythm  Pulses: Normal pulses  Carotid pulses are 2+ on the right side and 2+ on the left side  Heart sounds: S1 normal and S2 normal    Pulmonary:      Effort: Pulmonary effort is normal       Breath sounds: Normal breath sounds  No wheezing or rales  Abdominal:      General: Bowel sounds are normal       Palpations: Abdomen is soft  Tenderness: There is no abdominal tenderness  Musculoskeletal:         General: No tenderness  Normal range of motion  Cervical back: Normal range of motion and neck supple  Skin:     General: Skin is warm  Neurological:      Mental Status: He is alert and oriented to person, place, and time  Cranial Nerves: No cranial nerve deficit  Deep Tendon Reflexes: Reflexes are normal and symmetric

## 2023-01-11 NOTE — TELEPHONE ENCOUNTER
----- Message from Nubia Cool MD sent at 1/10/2023  4:38 PM EST -----  Please call the patient regarding his abnormal result  Please inform patient that renal function has worsened to creatinine 3 29  Please ask him to decrease the dose of metolazone to twice a week

## 2023-01-11 NOTE — TELEPHONE ENCOUNTER
I spoke to Nathan Lawton and he advised that his cardiologist stopped the metolazone yesterday  Please advise on changes made

## 2023-01-12 ENCOUNTER — HOSPITAL ENCOUNTER (OUTPATIENT)
Dept: INFUSION CENTER | Facility: HOSPITAL | Age: 83
End: 2023-01-12
Attending: INTERNAL MEDICINE

## 2023-01-12 ENCOUNTER — OFFICE VISIT (OUTPATIENT)
Dept: NEPHROLOGY | Facility: CLINIC | Age: 83
End: 2023-01-12

## 2023-01-12 VITALS
BODY MASS INDEX: 26.72 KG/M2 | DIASTOLIC BLOOD PRESSURE: 70 MMHG | SYSTOLIC BLOOD PRESSURE: 144 MMHG | HEART RATE: 67 BPM | HEIGHT: 69 IN | WEIGHT: 180.4 LBS

## 2023-01-12 DIAGNOSIS — N25.81 SECONDARY HYPERPARATHYROIDISM OF RENAL ORIGIN (HCC): ICD-10-CM

## 2023-01-12 DIAGNOSIS — M89.9 CHRONIC KIDNEY DISEASE-MINERAL AND BONE DISORDER: ICD-10-CM

## 2023-01-12 DIAGNOSIS — R60.0 BILATERAL LOWER EXTREMITY EDEMA: ICD-10-CM

## 2023-01-12 DIAGNOSIS — R80.1 PERSISTENT PROTEINURIA: ICD-10-CM

## 2023-01-12 DIAGNOSIS — I12.9 BENIGN HYPERTENSION WITH CHRONIC KIDNEY DISEASE, STAGE IV (HCC): ICD-10-CM

## 2023-01-12 DIAGNOSIS — E11.3293 TYPE 2 DIABETES MELLITUS WITH BOTH EYES AFFECTED BY MILD NONPROLIFERATIVE RETINOPATHY WITHOUT MACULAR EDEMA, WITHOUT LONG-TERM CURRENT USE OF INSULIN (HCC): ICD-10-CM

## 2023-01-12 DIAGNOSIS — N18.4 STAGE 4 CHRONIC KIDNEY DISEASE (HCC): Primary | ICD-10-CM

## 2023-01-12 DIAGNOSIS — N18.4 ANEMIA DUE TO STAGE 4 CHRONIC KIDNEY DISEASE (HCC): ICD-10-CM

## 2023-01-12 DIAGNOSIS — D63.1 ANEMIA DUE TO STAGE 4 CHRONIC KIDNEY DISEASE (HCC): ICD-10-CM

## 2023-01-12 DIAGNOSIS — E87.20 METABOLIC ACIDOSIS: ICD-10-CM

## 2023-01-12 DIAGNOSIS — E83.9 CHRONIC KIDNEY DISEASE-MINERAL AND BONE DISORDER: ICD-10-CM

## 2023-01-12 DIAGNOSIS — N17.9 ACUTE KIDNEY INJURY (HCC): ICD-10-CM

## 2023-01-12 DIAGNOSIS — N18.4 BENIGN HYPERTENSION WITH CHRONIC KIDNEY DISEASE, STAGE IV (HCC): ICD-10-CM

## 2023-01-12 DIAGNOSIS — N18.9 CHRONIC KIDNEY DISEASE-MINERAL AND BONE DISORDER: ICD-10-CM

## 2023-01-12 RX ORDER — SODIUM BICARBONATE 650 MG/1
1300 TABLET ORAL 2 TIMES DAILY
Qty: 360 TABLET | Refills: 3
Start: 2023-01-12

## 2023-01-13 ENCOUNTER — APPOINTMENT (OUTPATIENT)
Dept: LAB | Facility: CLINIC | Age: 83
End: 2023-01-13

## 2023-01-13 DIAGNOSIS — N18.4 ANEMIA DUE TO STAGE 4 CHRONIC KIDNEY DISEASE (HCC): ICD-10-CM

## 2023-01-13 DIAGNOSIS — I25.5 ISCHEMIC CARDIOMYOPATHY: Primary | ICD-10-CM

## 2023-01-13 DIAGNOSIS — R82.89 POSITIVE URINARY CYTOLOGY: ICD-10-CM

## 2023-01-13 DIAGNOSIS — D63.1 ANEMIA DUE TO STAGE 4 CHRONIC KIDNEY DISEASE (HCC): ICD-10-CM

## 2023-01-13 DIAGNOSIS — C67.5 MALIGNANT NEOPLASM OF URINARY BLADDER NECK (HCC): ICD-10-CM

## 2023-01-13 DIAGNOSIS — D49.4 BLADDER TUMOR: ICD-10-CM

## 2023-01-13 DIAGNOSIS — C67.8 MALIGNANT NEOPLASM OF OVERLAPPING SITES OF BLADDER (HCC): ICD-10-CM

## 2023-01-13 LAB
ANION GAP SERPL CALCULATED.3IONS-SCNC: 9 MMOL/L (ref 4–13)
BASOPHILS # BLD AUTO: 0.08 THOUSANDS/ÂΜL (ref 0–0.1)
BASOPHILS NFR BLD AUTO: 1 % (ref 0–1)
BUN SERPL-MCNC: 68 MG/DL (ref 5–25)
CALCIUM SERPL-MCNC: 9.8 MG/DL (ref 8.3–10.1)
CHLORIDE SERPL-SCNC: 98 MMOL/L (ref 96–108)
CO2 SERPL-SCNC: 28 MMOL/L (ref 21–32)
CREAT SERPL-MCNC: 3.21 MG/DL (ref 0.6–1.3)
EOSINOPHIL # BLD AUTO: 0.25 THOUSAND/ÂΜL (ref 0–0.61)
EOSINOPHIL NFR BLD AUTO: 2 % (ref 0–6)
ERYTHROCYTE [DISTWIDTH] IN BLOOD BY AUTOMATED COUNT: 15.7 % (ref 11.6–15.1)
GFR SERPL CREATININE-BSD FRML MDRD: 17 ML/MIN/1.73SQ M
GLUCOSE P FAST SERPL-MCNC: 166 MG/DL (ref 65–99)
HCT VFR BLD AUTO: 38.3 % (ref 36.5–49.3)
HGB BLD-MCNC: 12 G/DL (ref 12–17)
IMM GRANULOCYTES # BLD AUTO: 0.05 THOUSAND/UL (ref 0–0.2)
IMM GRANULOCYTES NFR BLD AUTO: 1 % (ref 0–2)
LYMPHOCYTES # BLD AUTO: 1.34 THOUSANDS/ÂΜL (ref 0.6–4.47)
LYMPHOCYTES NFR BLD AUTO: 13 % (ref 14–44)
MCH RBC QN AUTO: 31.7 PG (ref 26.8–34.3)
MCHC RBC AUTO-ENTMCNC: 31.3 G/DL (ref 31.4–37.4)
MCV RBC AUTO: 101 FL (ref 82–98)
MONOCYTES # BLD AUTO: 1.07 THOUSAND/ÂΜL (ref 0.17–1.22)
MONOCYTES NFR BLD AUTO: 10 % (ref 4–12)
NEUTROPHILS # BLD AUTO: 7.7 THOUSANDS/ÂΜL (ref 1.85–7.62)
NEUTS SEG NFR BLD AUTO: 73 % (ref 43–75)
NRBC BLD AUTO-RTO: 0 /100 WBCS
PLATELET # BLD AUTO: 221 THOUSANDS/UL (ref 149–390)
PMV BLD AUTO: 12.2 FL (ref 8.9–12.7)
POTASSIUM SERPL-SCNC: 3.8 MMOL/L (ref 3.5–5.3)
RBC # BLD AUTO: 3.79 MILLION/UL (ref 3.88–5.62)
SODIUM SERPL-SCNC: 135 MMOL/L (ref 135–147)
WBC # BLD AUTO: 10.49 THOUSAND/UL (ref 4.31–10.16)

## 2023-01-13 NOTE — PROGRESS NOTES
Spoke to Mr La Us 1/13/2023 at 2:10 pm     AM blood work - BUN 68, Creatinine 3 21    ( improved from 74 , 3 29 )    Advised him to remain off Torsemide and Zaroxolyn and repeat BMP on 1/16/2023  His weight on his home scale is up from 174 8 to 177 4 lbs  He denies SOB, LE edema

## 2023-01-14 NOTE — RESULT ENCOUNTER NOTE
Please call the patient regarding his abnormal result  Please inform that renal function improving slightly to cr 3 2 today  Expect to improve further as holding diuretics     Please order BMP to be done in two weeks for KO

## 2023-01-16 ENCOUNTER — TELEPHONE (OUTPATIENT)
Dept: NEPHROLOGY | Facility: CLINIC | Age: 83
End: 2023-01-16

## 2023-01-16 ENCOUNTER — APPOINTMENT (OUTPATIENT)
Dept: LAB | Facility: CLINIC | Age: 83
End: 2023-01-16

## 2023-01-16 DIAGNOSIS — R60.0 BILATERAL LOWER EXTREMITY EDEMA: Primary | ICD-10-CM

## 2023-01-16 DIAGNOSIS — N18.4 STAGE 4 CHRONIC KIDNEY DISEASE (HCC): ICD-10-CM

## 2023-01-16 DIAGNOSIS — C67.8 MALIGNANT NEOPLASM OF OVERLAPPING SITES OF BLADDER (HCC): ICD-10-CM

## 2023-01-16 LAB
ALBUMIN SERPL BCP-MCNC: 3.7 G/DL (ref 3.5–5)
ALP SERPL-CCNC: 88 U/L (ref 46–116)
ALT SERPL W P-5'-P-CCNC: 17 U/L (ref 12–78)
ANION GAP SERPL CALCULATED.3IONS-SCNC: 5 MMOL/L (ref 4–13)
AST SERPL W P-5'-P-CCNC: 15 U/L (ref 5–45)
BASOPHILS # BLD AUTO: 0.04 THOUSANDS/ÂΜL (ref 0–0.1)
BASOPHILS NFR BLD AUTO: 0 % (ref 0–1)
BILIRUB SERPL-MCNC: 0.63 MG/DL (ref 0.2–1)
BUN SERPL-MCNC: 64 MG/DL (ref 5–25)
CALCIUM SERPL-MCNC: 9.6 MG/DL (ref 8.3–10.1)
CHLORIDE SERPL-SCNC: 104 MMOL/L (ref 96–108)
CO2 SERPL-SCNC: 28 MMOL/L (ref 21–32)
CREAT SERPL-MCNC: 2.55 MG/DL (ref 0.6–1.3)
EOSINOPHIL # BLD AUTO: 0.21 THOUSAND/ÂΜL (ref 0–0.61)
EOSINOPHIL NFR BLD AUTO: 2 % (ref 0–6)
ERYTHROCYTE [DISTWIDTH] IN BLOOD BY AUTOMATED COUNT: 15.3 % (ref 11.6–15.1)
GFR SERPL CREATININE-BSD FRML MDRD: 22 ML/MIN/1.73SQ M
GLUCOSE P FAST SERPL-MCNC: 151 MG/DL (ref 65–99)
HCT VFR BLD AUTO: 39.5 % (ref 36.5–49.3)
HGB BLD-MCNC: 12.3 G/DL (ref 12–17)
IMM GRANULOCYTES # BLD AUTO: 0.03 THOUSAND/UL (ref 0–0.2)
IMM GRANULOCYTES NFR BLD AUTO: 0 % (ref 0–2)
LYMPHOCYTES # BLD AUTO: 1.07 THOUSANDS/ÂΜL (ref 0.6–4.47)
LYMPHOCYTES NFR BLD AUTO: 11 % (ref 14–44)
MCH RBC QN AUTO: 31.4 PG (ref 26.8–34.3)
MCHC RBC AUTO-ENTMCNC: 31.1 G/DL (ref 31.4–37.4)
MCV RBC AUTO: 101 FL (ref 82–98)
MONOCYTES # BLD AUTO: 0.99 THOUSAND/ÂΜL (ref 0.17–1.22)
MONOCYTES NFR BLD AUTO: 10 % (ref 4–12)
NEUTROPHILS # BLD AUTO: 7.86 THOUSANDS/ÂΜL (ref 1.85–7.62)
NEUTS SEG NFR BLD AUTO: 77 % (ref 43–75)
NRBC BLD AUTO-RTO: 0 /100 WBCS
PLATELET # BLD AUTO: 200 THOUSANDS/UL (ref 149–390)
PMV BLD AUTO: 12.5 FL (ref 8.9–12.7)
POTASSIUM SERPL-SCNC: 4.4 MMOL/L (ref 3.5–5.3)
PROT SERPL-MCNC: 7.8 G/DL (ref 6.4–8.4)
RBC # BLD AUTO: 3.92 MILLION/UL (ref 3.88–5.62)
SODIUM SERPL-SCNC: 137 MMOL/L (ref 135–147)
WBC # BLD AUTO: 10.2 THOUSAND/UL (ref 4.31–10.16)

## 2023-01-16 NOTE — TELEPHONE ENCOUNTER
I spoke to Mitch Cruz he is aware that labs from 1/13 creatinine is improving was at 3 2  Continue holding diuretics      He went for labs today for Crystal Ramírez , they are still in process but pt would like for youto review once final

## 2023-01-16 NOTE — TELEPHONE ENCOUNTER
----- Message from Ramon Snyder MD sent at 1/13/2023  9:10 PM EST -----  Please call the patient regarding his abnormal result  Please inform that renal function improving slightly to cr 3 2 today  Expect to improve further as holding diuretics     Please order BMP to be done in two weeks for KO

## 2023-01-17 RX ORDER — TORSEMIDE 20 MG/1
20 TABLET ORAL DAILY
Qty: 90 TABLET | Refills: 3 | Status: SHIPPED | OUTPATIENT
Start: 2023-01-17

## 2023-01-17 NOTE — TELEPHONE ENCOUNTER
Cr improved to 2 55 mg/dl  Says was dizzy on the next day after last nephrology visit  Says left has some swelling in left foot and left leg  On Farxiga  Weight stable 179 lbs     -started torsemide 20 mg daily  Check BMP in two weeks

## 2023-01-17 NOTE — TELEPHONE ENCOUNTER
Patient called the office today requesting a call from Holly to discuss labs he had yesterday  Pt would also like to update him on a couple of other things

## 2023-01-19 ENCOUNTER — FOLLOW UP (OUTPATIENT)
Dept: URBAN - METROPOLITAN AREA CLINIC 6 | Facility: CLINIC | Age: 83
End: 2023-01-19

## 2023-01-19 ENCOUNTER — HOSPITAL ENCOUNTER (OUTPATIENT)
Dept: INFUSION CENTER | Facility: HOSPITAL | Age: 83
Discharge: HOME | End: 2023-01-19
Attending: INTERNAL MEDICINE

## 2023-01-19 VITALS
HEIGHT: 69 IN | TEMPERATURE: 97.6 F | HEART RATE: 73 BPM | SYSTOLIC BLOOD PRESSURE: 138 MMHG | DIASTOLIC BLOOD PRESSURE: 72 MMHG | RESPIRATION RATE: 18 BRPM | BODY MASS INDEX: 27.3 KG/M2 | WEIGHT: 184.3 LBS

## 2023-01-19 DIAGNOSIS — H35.3211: ICD-10-CM

## 2023-01-19 DIAGNOSIS — E11.3293: ICD-10-CM

## 2023-01-19 DIAGNOSIS — H25.813: ICD-10-CM

## 2023-01-19 DIAGNOSIS — H35.3124: ICD-10-CM

## 2023-01-19 DIAGNOSIS — C67.8 MALIGNANT NEOPLASM OF OVERLAPPING SITES OF BLADDER (HCC): Primary | ICD-10-CM

## 2023-01-19 PROCEDURE — 99024 POSTOP FOLLOW-UP VISIT: CPT

## 2023-01-19 RX ORDER — SODIUM CHLORIDE 9 MG/ML
20 INJECTION, SOLUTION INTRAVENOUS ONCE
Status: COMPLETED | OUTPATIENT
Start: 2023-01-19 | End: 2023-01-19

## 2023-01-19 RX ADMIN — FOSAPREPITANT 150 MG: 150 INJECTION, POWDER, LYOPHILIZED, FOR SOLUTION INTRAVENOUS at 10:08

## 2023-01-19 RX ADMIN — GEMCITABINE 1600 MG: 38 INJECTION, SOLUTION INTRAVENOUS at 10:43

## 2023-01-19 RX ADMIN — DEXAMETHASONE SODIUM PHOSPHATE: 10 INJECTION, SOLUTION INTRAMUSCULAR; INTRAVENOUS at 09:39

## 2023-01-19 RX ADMIN — CARBOPLATIN 196 MG: 10 INJECTION, SOLUTION INTRAVENOUS at 11:17

## 2023-01-19 RX ADMIN — SODIUM CHLORIDE 20 ML/HR: 0.9 INJECTION, SOLUTION INTRAVENOUS at 09:39

## 2023-01-19 ASSESSMENT — TONOMETRY
OD_IOP_MMHG: 15
OS_IOP_MMHG: 12

## 2023-01-19 ASSESSMENT — VISUAL ACUITY: OS_CC: (L)20/70

## 2023-01-20 ENCOUNTER — PROCEDURE VISIT (OUTPATIENT)
Dept: UROLOGY | Facility: AMBULATORY SURGERY CENTER | Age: 83
End: 2023-01-20

## 2023-01-20 VITALS
DIASTOLIC BLOOD PRESSURE: 82 MMHG | HEIGHT: 68 IN | BODY MASS INDEX: 27.89 KG/M2 | OXYGEN SATURATION: 97 % | HEART RATE: 72 BPM | WEIGHT: 184 LBS | SYSTOLIC BLOOD PRESSURE: 144 MMHG

## 2023-01-20 DIAGNOSIS — C68.0 URETHRAL CANCER (HCC): Primary | ICD-10-CM

## 2023-01-20 RX ORDER — LOSARTAN POTASSIUM 25 MG/1
25 TABLET ORAL DAILY
COMMUNITY

## 2023-01-20 NOTE — PROGRESS NOTES
Cystoscopy     Date/Time 1/20/2023 9:54 AM     Performed by  Aaron Nunez MD     Authorized by Aaron Nunez MD        Venita Oconnor is an 44-year-old male with a complicated urologic history including refractory CIS of the bladder  He ultimately underwent radical cystoprostatectomy with ileal conduit creation performed at the [address] [place] [address]  Unfortunately has recurrent disease in the right iliac chain up to the level of the aortic bifurcation  He was initially treated pembrolizumab and ultimately with gemcitabine and carboplatin  According to medical oncology his most recent CT scan performed at the [address] [place] [address] in December 2022 shows no recurrent disease in the abdomen or pelvis  His most recent CT scan from November 30, 2022 shows no evidence of recurrence at this time  Hopefully this is response to his platinum based chemotherapy  More recently I have referred him back to the [address] [place] [address] after urethroscopy revealed carpeting of his urethra with urothelial carcinoma for consideration of completion urethra ectomy  This has not been performed as at the time he was found to have stage IV disease  His urethra was biopsied and most consistent with high-grade noninvasive urothelial carcinoma from the spring 2022  He presents today for surveillance ureteroscopy  His genitalia was prepped and draped in a sterile fashion  A flexible cystoscope was passed into the urethra after the meatus was dilated with a cone-tip dilator  Approximately one third of the way into the pendulous urethra a tight narrowing was identified  The scope could not be passed proximal   Distal to this, however, there was no evidence of visualized urothelial carcinoma  I did not attempt to further dilate the urethra in the office as there was some mild bleeding at the meatal opening  Pressure was applied with gauze and the bleeding subsided    Dry gauze was applied with tape to the glans penis    Impression: History of carcinoma in situ of the bladder, status post radical cystoprostatectomy with ileal conduit creation, history of recurrence with iliac adenopathy, resolution of adenopathy with platinum based chemotherapy, 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, urethral stricture    Plan: I had a lengthy discussion with Radha James in the office today regarding his urethral disease  Unfortunately I was unable to evaluate at least two thirds if not three quarters of his urethral length  I discussed cystoscopy in the operating room with dilation along with fulguration and biopsy of any residual lesions  He is not interested in aggressive surgical intervention at this time  He is hopeful that the chemotherapy is working not only on his retroperitoneal adenopathy but also on the disease within the urethra  He defers cystoscopy in the operating room at this time  He understands that without cystoscopy I cannot adequately assess if he has recurrent disease  He understands this risk and wishes to return in 3 months time

## 2023-01-20 NOTE — LETTER
January 20, 2023     Millicent Riley,   [address]  [address] [place] [address] [place] [address]    Patient: Jv Kemp   YOB: 1940   Date of Visit: 1/20/2023       Dear Dr Carey Hernandez: Thank you for referring Mando Valle to me for evaluation  Below are my notes for this consultation  If you have questions, please do not hesitate to call me  I look forward to following your patient along with you  Sincerely,        Suleiman Jhaveri MD        CC: MD Adis Bridges MD Willodean Sermon, MD  1/20/2023 11:11 AM  Sign when Signing Visit     Cystoscopy     Date/Time 1/20/2023 9:54 AM     Performed by  Suleiman Jhaveri MD     Authorized by Suleiman Jhaveri MD        Te Rao is an 80-year-old male with a complicated urologic history including refractory CIS of the bladder  He ultimately underwent radical cystoprostatectomy with ileal conduit creation performed at the [address] [place] [address]  Unfortunately has recurrent disease in the right iliac chain up to the level of the aortic bifurcation  He was initially treated pembrolizumab and ultimately with gemcitabine and carboplatin  According to medical oncology his most recent CT scan performed at the [address] [place] [address] in December 2022 shows no recurrent disease in the abdomen or pelvis  His most recent CT scan from November 30, 2022 shows no evidence of recurrence at this time  Hopefully this is response to his platinum based chemotherapy  More recently I have referred him back to the [address] [place] [address] after urethroscopy revealed carpeting of his urethra with urothelial carcinoma for consideration of completion urethra ectomy  This has not been performed as at the time he was found to have stage IV disease  His urethra was biopsied and most consistent with high-grade noninvasive urothelial carcinoma from the spring 2022  He presents today for surveillance ureteroscopy      His genitalia was prepped and draped in a sterile fashion  A flexible cystoscope was passed into the urethra after the meatus was dilated with a cone-tip dilator  Approximately one third of the way into the pendulous urethra a tight narrowing was identified  The scope could not be passed proximal   Distal to this, however, there was no evidence of visualized urothelial carcinoma  I did not attempt to further dilate the urethra in the office as there was some mild bleeding at the meatal opening  Pressure was applied with gauze and the bleeding subsided  Dry gauze was applied with tape to the glans penis    Impression: History of carcinoma in situ of the bladder, status post radical cystoprostatectomy with ileal conduit creation, history of recurrence with iliac adenopathy, resolution of adenopathy with platinum based chemotherapy, 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, urethral stricture    Plan: I had a lengthy discussion with Venita Oconnor in the office today regarding his urethral disease  Unfortunately I was unable to evaluate at least two thirds if not three quarters of his urethral length  I discussed cystoscopy in the operating room with dilation along with fulguration and biopsy of any residual lesions  He is not interested in aggressive surgical intervention at this time  He is hopeful that the chemotherapy is working not only on his retroperitoneal adenopathy but also on the disease within the urethra  He defers cystoscopy in the operating room at this time  He understands that without cystoscopy I cannot adequately assess if he has recurrent disease  He understands this risk and wishes to return in 3 months time

## 2023-01-22 DIAGNOSIS — C67.8 MALIGNANT NEOPLASM OF OVERLAPPING SITES OF BLADDER (HCC): ICD-10-CM

## 2023-01-23 DIAGNOSIS — E11.59 TYPE 2 DIABETES MELLITUS WITH OTHER CIRCULATORY COMPLICATION, WITHOUT LONG-TERM CURRENT USE OF INSULIN (HCC): ICD-10-CM

## 2023-01-23 RX ORDER — BLOOD-GLUCOSE METER
EACH MISCELLANEOUS
Qty: 1 KIT | Refills: 0 | Status: SHIPPED | OUTPATIENT
Start: 2023-01-23

## 2023-01-23 RX ORDER — TRAMADOL HYDROCHLORIDE 50 MG/1
TABLET ORAL
Qty: 20 TABLET | Refills: 0 | Status: SHIPPED | OUTPATIENT
Start: 2023-01-23

## 2023-01-24 DIAGNOSIS — D63.1 ANEMIA DUE TO STAGE 4 CHRONIC KIDNEY DISEASE: Primary | ICD-10-CM

## 2023-01-24 DIAGNOSIS — C67.5 MALIGNANT NEOPLASM OF URINARY BLADDER NECK (HCC): ICD-10-CM

## 2023-01-24 DIAGNOSIS — D49.4 BLADDER TUMOR: ICD-10-CM

## 2023-01-24 DIAGNOSIS — R82.89 POSITIVE URINARY CYTOLOGY: ICD-10-CM

## 2023-01-24 DIAGNOSIS — C67.8 MALIGNANT NEOPLASM OF OVERLAPPING SITES OF BLADDER (HCC): ICD-10-CM

## 2023-01-24 DIAGNOSIS — N18.4 ANEMIA DUE TO STAGE 4 CHRONIC KIDNEY DISEASE: Primary | ICD-10-CM

## 2023-01-26 ENCOUNTER — HOSPITAL ENCOUNTER (OUTPATIENT)
Dept: INFUSION CENTER | Facility: HOSPITAL | Age: 83
Discharge: HOME | End: 2023-01-26
Attending: INTERNAL MEDICINE

## 2023-01-28 PROBLEM — Z00.00 MEDICARE ANNUAL WELLNESS VISIT, SUBSEQUENT: Status: RESOLVED | Noted: 2019-05-23 | Resolved: 2023-01-28

## 2023-01-30 ENCOUNTER — LAB (OUTPATIENT)
Dept: LAB | Facility: CLINIC | Age: 83
End: 2023-01-30

## 2023-01-30 DIAGNOSIS — C67.8 MALIGNANT NEOPLASM OF OVERLAPPING SITES OF BLADDER (HCC): ICD-10-CM

## 2023-01-30 DIAGNOSIS — N18.4 STAGE 4 CHRONIC KIDNEY DISEASE (HCC): ICD-10-CM

## 2023-01-30 DIAGNOSIS — R60.0 BILATERAL LOWER EXTREMITY EDEMA: ICD-10-CM

## 2023-01-30 LAB
ALBUMIN SERPL BCP-MCNC: 3.6 G/DL (ref 3.5–5)
ALP SERPL-CCNC: 107 U/L (ref 46–116)
ALT SERPL W P-5'-P-CCNC: 33 U/L (ref 12–78)
ANION GAP SERPL CALCULATED.3IONS-SCNC: 6 MMOL/L (ref 4–13)
AST SERPL W P-5'-P-CCNC: 22 U/L (ref 5–45)
BASOPHILS # BLD AUTO: 0.02 THOUSANDS/ÂΜL (ref 0–0.1)
BASOPHILS NFR BLD AUTO: 0 % (ref 0–1)
BILIRUB SERPL-MCNC: 0.37 MG/DL (ref 0.2–1)
BUN SERPL-MCNC: 66 MG/DL (ref 5–25)
CALCIUM SERPL-MCNC: 9.4 MG/DL (ref 8.3–10.1)
CHLORIDE SERPL-SCNC: 105 MMOL/L (ref 96–108)
CO2 SERPL-SCNC: 25 MMOL/L (ref 21–32)
CREAT SERPL-MCNC: 2.95 MG/DL (ref 0.6–1.3)
EOSINOPHIL # BLD AUTO: 0.04 THOUSAND/ÂΜL (ref 0–0.61)
EOSINOPHIL NFR BLD AUTO: 1 % (ref 0–6)
ERYTHROCYTE [DISTWIDTH] IN BLOOD BY AUTOMATED COUNT: 14.6 % (ref 11.6–15.1)
GFR SERPL CREATININE-BSD FRML MDRD: 18 ML/MIN/1.73SQ M
GLUCOSE P FAST SERPL-MCNC: 151 MG/DL (ref 65–99)
HCT VFR BLD AUTO: 35.6 % (ref 36.5–49.3)
HGB BLD-MCNC: 11.4 G/DL (ref 12–17)
IMM GRANULOCYTES # BLD AUTO: 0.03 THOUSAND/UL (ref 0–0.2)
IMM GRANULOCYTES NFR BLD AUTO: 1 % (ref 0–2)
LYMPHOCYTES # BLD AUTO: 0.81 THOUSANDS/ÂΜL (ref 0.6–4.47)
LYMPHOCYTES NFR BLD AUTO: 13 % (ref 14–44)
MCH RBC QN AUTO: 31.1 PG (ref 26.8–34.3)
MCHC RBC AUTO-ENTMCNC: 32 G/DL (ref 31.4–37.4)
MCV RBC AUTO: 97 FL (ref 82–98)
MONOCYTES # BLD AUTO: 0.47 THOUSAND/ÂΜL (ref 0.17–1.22)
MONOCYTES NFR BLD AUTO: 8 % (ref 4–12)
NEUTROPHILS # BLD AUTO: 4.83 THOUSANDS/ÂΜL (ref 1.85–7.62)
NEUTS SEG NFR BLD AUTO: 77 % (ref 43–75)
NRBC BLD AUTO-RTO: 0 /100 WBCS
PMV BLD AUTO: 12.8 FL (ref 8.9–12.7)
POTASSIUM SERPL-SCNC: 4.8 MMOL/L (ref 3.5–5.3)
PROT SERPL-MCNC: 7.2 G/DL (ref 6.4–8.4)
RBC # BLD AUTO: 3.67 MILLION/UL (ref 3.88–5.62)
SODIUM SERPL-SCNC: 136 MMOL/L (ref 135–147)
WBC # BLD AUTO: 6.2 THOUSAND/UL (ref 4.31–10.16)

## 2023-01-30 NOTE — PROGRESS NOTES
Per Dr Faisal Balderas Carboplatin to be held on 02/1/2023, going forward Carboplatin AUC 2 every 14 days

## 2023-01-31 ENCOUNTER — TELEPHONE (OUTPATIENT)
Dept: NEPHROLOGY | Facility: CLINIC | Age: 83
End: 2023-01-31

## 2023-01-31 DIAGNOSIS — N17.9 ACUTE KIDNEY INJURY (HCC): ICD-10-CM

## 2023-01-31 DIAGNOSIS — N18.4 STAGE 4 CHRONIC KIDNEY DISEASE (HCC): Primary | ICD-10-CM

## 2023-01-31 DIAGNOSIS — C67.8 MALIGNANT NEOPLASM OF OVERLAPPING SITES OF BLADDER (HCC): Primary | ICD-10-CM

## 2023-01-31 NOTE — RESULT ENCOUNTER NOTE
Please inform patient that creatinine slightly trended up to 2 9 with use of torsemide  We may have to accept higher creatinine to maintain volume status  Please ask if he has any lower extremity edema or shortness of breath or weight gain    Continue same dose of torsemide, please order for repeat BMP to be done in 2 weeks for chronic kidney disease stage IV

## 2023-01-31 NOTE — TELEPHONE ENCOUNTER
----- Message from Neymar Estevez MD sent at 1/31/2023  9:25 AM EST -----  Please inform patient that creatinine slightly trended up to 2 9 with use of torsemide  We may have to accept higher creatinine to maintain volume status  Please ask if he has any lower extremity edema or shortness of breath or weight gain    Continue same dose of torsemide, please order for repeat BMP to be done in 2 weeks for chronic kidney disease stage IV

## 2023-02-01 ENCOUNTER — HOSPITAL ENCOUNTER (OUTPATIENT)
Dept: INFUSION CENTER | Facility: HOSPITAL | Age: 83
Discharge: HOME | End: 2023-02-01
Attending: INTERNAL MEDICINE

## 2023-02-01 VITALS
TEMPERATURE: 95 F | WEIGHT: 180.78 LBS | HEART RATE: 54 BPM | DIASTOLIC BLOOD PRESSURE: 71 MMHG | SYSTOLIC BLOOD PRESSURE: 160 MMHG | RESPIRATION RATE: 18 BRPM | HEIGHT: 68 IN | BODY MASS INDEX: 27.4 KG/M2

## 2023-02-01 DIAGNOSIS — D49.4 BLADDER TUMOR: ICD-10-CM

## 2023-02-01 DIAGNOSIS — N18.4 ANEMIA DUE TO STAGE 4 CHRONIC KIDNEY DISEASE (HCC): Primary | ICD-10-CM

## 2023-02-01 DIAGNOSIS — R82.89 POSITIVE URINARY CYTOLOGY: ICD-10-CM

## 2023-02-01 DIAGNOSIS — C67.8 MALIGNANT NEOPLASM OF OVERLAPPING SITES OF BLADDER (HCC): Primary | ICD-10-CM

## 2023-02-01 DIAGNOSIS — D63.1 ANEMIA DUE TO STAGE 4 CHRONIC KIDNEY DISEASE (HCC): Primary | ICD-10-CM

## 2023-02-01 DIAGNOSIS — C67.5 MALIGNANT NEOPLASM OF URINARY BLADDER NECK (HCC): ICD-10-CM

## 2023-02-01 DIAGNOSIS — C67.8 MALIGNANT NEOPLASM OF OVERLAPPING SITES OF BLADDER (HCC): ICD-10-CM

## 2023-02-01 LAB — PLATELET # BLD AUTO: 69 THOUSANDS/UL (ref 149–390)

## 2023-02-01 RX ORDER — SODIUM CHLORIDE 9 MG/ML
20 INJECTION, SOLUTION INTRAVENOUS ONCE
Status: COMPLETED | OUTPATIENT
Start: 2023-02-01 | End: 2023-02-01

## 2023-02-01 RX ADMIN — GEMCITABINE 1600 MG: 38 INJECTION, SOLUTION INTRAVENOUS at 12:41

## 2023-02-01 RX ADMIN — SODIUM CHLORIDE 20 ML/HR: 0.9 INJECTION, SOLUTION INTRAVENOUS at 11:28

## 2023-02-01 RX ADMIN — DEXAMETHASONE SODIUM PHOSPHATE: 10 INJECTION, SOLUTION INTRAMUSCULAR; INTRAVENOUS at 11:28

## 2023-02-01 RX ADMIN — FOSAPREPITANT 150 MG: 150 INJECTION, POWDER, LYOPHILIZED, FOR SOLUTION INTRAVENOUS at 11:57

## 2023-02-01 NOTE — TELEPHONE ENCOUNTER
I spoke to Ammon given Jamila Ortiz is at chemo right now  She is aware of renal function level  She will keep him on the current dosage of torsemide  BMP due in 2 weeks  She said he is not having any swelling, SOB or weight gain at this time

## 2023-02-01 NOTE — PROGRESS NOTES
Pt tolerated Gemzar treatment without complications   Pt given print out of future appointments and AVS

## 2023-02-03 ENCOUNTER — TELEPHONE (OUTPATIENT)
Dept: NEPHROLOGY | Facility: CLINIC | Age: 83
End: 2023-02-03

## 2023-02-03 NOTE — TELEPHONE ENCOUNTER
Patient left voice mail confused about labs   I returned call and explained that Dr Saul Worthington wants bmp done around 02/15/23

## 2023-02-07 ENCOUNTER — OFFICE VISIT (OUTPATIENT)
Dept: INTERNAL MEDICINE CLINIC | Facility: CLINIC | Age: 83
End: 2023-02-07

## 2023-02-07 VITALS
TEMPERATURE: 97 F | DIASTOLIC BLOOD PRESSURE: 68 MMHG | BODY MASS INDEX: 29.03 KG/M2 | WEIGHT: 185 LBS | OXYGEN SATURATION: 99 % | HEART RATE: 61 BPM | HEIGHT: 67 IN | SYSTOLIC BLOOD PRESSURE: 130 MMHG | RESPIRATION RATE: 19 BRPM

## 2023-02-07 DIAGNOSIS — E11.3293 TYPE 2 DIABETES MELLITUS WITH BOTH EYES AFFECTED BY MILD NONPROLIFERATIVE RETINOPATHY WITHOUT MACULAR EDEMA, WITHOUT LONG-TERM CURRENT USE OF INSULIN (HCC): Primary | ICD-10-CM

## 2023-02-07 DIAGNOSIS — I50.23 ACUTE ON CHRONIC SYSTOLIC CONGESTIVE HEART FAILURE (HCC): ICD-10-CM

## 2023-02-07 DIAGNOSIS — I10 BENIGN ESSENTIAL HYPERTENSION: ICD-10-CM

## 2023-02-07 DIAGNOSIS — N18.4 STAGE 4 CHRONIC KIDNEY DISEASE (HCC): ICD-10-CM

## 2023-02-07 DIAGNOSIS — E78.2 MIXED HYPERLIPIDEMIA: ICD-10-CM

## 2023-02-07 NOTE — PROGRESS NOTES
Name: Benjamin Graf      : 1940      MRN: [id number]  Encounter Provider: Bhupendra Flores DO  Encounter Date: 2023   Encounter department: [facility]    Assessment & Plan     1  Type 2 diabetes mellitus with both eyes affected by mild nonproliferative retinopathy without macular edema, without long-term current use of insulin (HCC)  -     Glucose, fasting; Future  -     Hemoglobin A1C; Future    2  Stage 4 chronic kidney disease [facility])  Assessment & Plan:  Lab Results   Component Value Date    EGFR 18 2023    EGFR 22 2023    EGFR 17 2023    CREATININE 2 95 (H) 2023    CREATININE 2 55 (H) 2023    CREATININE 3 21 (H) 2023   Again despite high doses of diuretics his renal function is stable  We will continue to follow-up with nephrology  3  Mixed hyperlipidemia  Assessment & Plan:  History of hyperlipidemia  Patient remains on atorvastatin 40 mg daily  History of coronary artery disease  We did reinforce the importance of watching his diet including restriction of his intake of fats and cholesterol  He will continue to have surveillance through our office and with cardiology  4  Benign essential hypertension  Assessment & Plan:  Patient's blood pressure is showing adequate control with present treatment  Along with his nephrologist oncologist and cardiologist we continue to monitor renal function  Patient will continue present medication and surveillance  5  Acute on chronic systolic congestive heart failure (HCC)  Assessment & Plan:  Wt Readings from Last 3 Encounters:   23 83 9 kg (185 lb)   23 82 kg (180 lb 12 4 oz)   23 83 5 kg (184 lb)     With the help of cardiology, nephrology we have been able to keep the patient out of failure  States that he still has some slight shortness of breath with exertion but is not as severe as in the past   Continues to take diuretics as prescribed    Renal function is stable  Subjective     Patient is an 45-year-old male history of extensive medical problems as outlined previously who is here today for follow-up after 1 month period of time  Accompanied by his wife  Patient continues to follow-up with hematology oncology urology cardiology and nephrology  States in general he is stable and 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  Review of Systems   Constitutional: Negative  HENT: Negative  Eyes: Negative  Respiratory: Negative  Cardiovascular: Negative  Gastrointestinal: Negative  Did have an episode of constipation a few weeks ago resolved with stool softeners   Endocrine: Negative  Genitourinary: Negative  Musculoskeletal: Positive for arthralgias and gait problem  Negative for back pain, joint swelling, myalgias, neck pain and neck stiffness  Some diffuse arthralgias  Nothing new  Skin: Negative  Allergic/Immunologic: Negative  Neurological: Positive for light-headedness  Negative for dizziness, tremors, seizures, syncope, facial asymmetry, speech difficulty, weakness, numbness and headaches  States he is having episodes of lightheadedness when getting up in the morning  No difficulty with ambulation during the day   Hematological: Negative  Psychiatric/Behavioral: Negative          Past Medical History:   Diagnosis Date   • Acute kidney injury ([facility] )    • Anemia Jan 2022   • Arthritis     Hands   • Wright esophagus    • BPH with obstruction/lower urinary tract symptoms    • CAD (coronary artery disease)     Last assessed 09/16/15   • Cancer [facility])     bladder   • Cataract, acquired     Last assessed 10/10/17   • Chronic kidney disease    • Coronary artery disease    • Diabetes mellitus ([facility] )     NIDDM   • Diabetic neuropathy (HCC)     Feet   • Enlarged prostate with lower urinary tract symptoms (LUTS)     Last assessed 10/10/17   • Erectile dysfunction    • GERD (gastroesophageal reflux disease)     Last assessed 10/10/17   • Hemoptysis     Last assessed 03/14/16   • Hypercholesterolemia     Last assessed 10/10/17   • Hypertension     Last assessed 10/10/17   • Kidney stone    • Macular degeneration     Right eye is particularly affected-peripheral vision intact   • Myocardial infarction [facility]) 1998   • OAB (overactive bladder)    • Testicular hypofunction    • Testicular hypogonadism     Last assessed 10/10/17   • Tinnitus    • Umbilical hernia     Last assessed 06/18/14   • Urge incontinence of urine    • Wears glasses      Past Surgical History:   Procedure Laterality Date   • BLADDER SURGERY     • COLONOSCOPY     • CORONARY ARTERY BYPASS GRAFT  07/16/2014    ABG x 4 LIMA to LAD,SVG to diagnoal 2 SVG to OM-1, SVG to PDA, resection of partial plerual mass   • CT GUIDED PERC DRAINAGE CATHETER PLACEMENT  02/19/2021   • CYSTOSCOPY  2013   • CYSTOSCOPY  02/08/2022    [place]   • ESOPHAGOGASTRODUODENOSCOPY     • FL RETROGRADE PYELOGRAM  12/20/2018   • FL RETROGRADE PYELOGRAM  12/05/2019   • INGUINAL HERNIA REPAIR Bilateral 2015   • IR DRAINAGE TUBE CHECK AND/OR REMOVAL  03/05/2021   • PHOTODYNAMIC THERAPY      For Barretts esophagus   • KS COLONOSCOPY FLX DX W/COLLJ SPEC WHEN PFRMD N/A 04/25/2016    Procedure: COLONOSCOPY;  Surgeon: Sera Quintero MD;  Location: [facility]; Service: Gastroenterology   • KS CYSTO W/REMOVAL OF LESIONS SMALL N/A 12/05/2019    Procedure: CYSTO W/TURBT AND TRANSURETHRAL PROSTATE BIOPSY AND OPENING OF BLADDER NECK CONTRACTURE, B/L Retrograde pyelogram;  Surgeon: Adrianna Aponte MD;  [facility];  Service: Urology   • KS CYSTOURETHROSCOPY W/DEST &/RMVL MED BLADDER TY N/A 04/16/2020    Procedure: CYSTOSCOPY, TRANSURETHRAL RESECTION OF BLADDER TUMOR (TURBT);   Surgeon: Adrianna Aponte MD;  [facility];  Service: Urology   • KS CYSTOURETHROSCOPY WITH BIOPSY N/A 09/10/2020    Procedure: CYSTOSCOPY, COLLECTION OF LEFT KIDNEY CYTOLOGY, BILATERAL RETROGRADE PYELOGRAM, BLADDER WALL BIOPSIES  AND FULGERAION, RANDOM BLADDER TUMOR BIOPSIES;  Surgeon: Coral Villavicencio MD;  Location: [address] [place] [address] MAIN OR;  Service: Urology   • UT CYSTOURETHROSCOPY WITH BIOPSY N/A 2022    Procedure: urethroscopy , biopsy of urethra with fulgeration;  Surgeon: Jerson Veronica MD;  Location:  MAIN OR;  Service: Urology   • PROSTATE SURGERY     • TONSILLECTOMY     • TRANSURETHRAL RESECTION OF PROSTATE N/A 2018    Procedure: CYSTO, PHOTO SELECTIVE VAPORIZATION OF PROSTATE, B/L RETROGRADE PYELOGRAM, TURBT;  Surgeon: Coral Villavicencio MD;  [facility];  Service: Urology   • UMBILICAL HERNIA REPAIR     • UPPER GASTROINTESTINAL ENDOSCOPY       Family History   Problem Relation Age of Onset   • Cancer Mother         Topical oral abrasian caused cancer   • Other Mother         Digestive System Complications   • Diabetes Father    • Heart disease Father    • Hypertension Father    • Coronary artery disease Father    • Hyperlipidemia Father      Social History     Socioeconomic History   • Marital status: /Civil Union     Spouse name: None   • Number of children: None   • Years of education: None   • Highest education level: None   Occupational History   • Occupation: Sales position   Tobacco Use   • Smoking status: Former     Packs/day: 1 00     Years: 10 00     Pack years: 10 00     Types: Cigarettes     Quit date: 1972     Years since quittin 1   • Smokeless tobacco: Never   • Tobacco comments:     Quit at age 28   Vaping Use   • Vaping Use: Never used   Substance and Sexual Activity   • Alcohol use: Not Currently     Alcohol/week: 2 0 standard drinks     Types: 1 Glasses of wine, 1 Cans of beer per week     Comment: Non since 7/15/2020   • Drug use: Never   • Sexual activity: Not Currently     Partners: Female   Other Topics Concern   • None   Social History Narrative    Always uses seatbelt        Caffeine use- Drinks 2 cups of coffee daily Social Determinants of Health     Financial Resource Strain: Low Risk    • Difficulty of Paying Living Expenses: Not very hard   Food Insecurity: Not on file   Transportation Needs: No Transportation Needs   • Lack of Transportation (Medical): No   • Lack of Transportation (Non-Medical): No   Physical Activity: Not on file   Stress: Not on file   Social Connections: Not on file   Intimate Partner Violence: Not on file   Housing Stability: Not on file     Current Outpatient Medications on File Prior to Visit   Medication Sig   • atorvastatin (LIPITOR) 40 mg tablet TAKE ONE TABLET BY MOUTH AT BEDTIME   • Blood Glucose Monitoring Suppl (OneTouch Verio Flex System) w/Device KIT Use to check blood sugars daily   • Cholecalciferol (VITAMIN D) 50 MCG (2000 UT) tablet Take 2,000 Units by mouth daily   • citalopram (CeleXA) 20 mg tablet TAKE ONE TABLET BY MOUTH EVERY DAY   • Farxiga 5 MG TABS TAKE ONE TABLET BY MOUTH EVERY DAY   • ferrous sulfate 324 (65 Fe) mg Take 1 tablet (324 mg total) by mouth daily   • Lancets (OneTouch Delica Plus [id number]) MISC TEST BLOOD GLUCOSE ONCE DAILY   • losartan (COZAAR) 25 mg tablet Take 25 mg by mouth daily   • magnesium oxide (MAG-OX) 400 mg Take 1 tablet (400 mg total) by mouth in the morning  • metolazone (ZAROXOLYN) 5 mg tablet Take 1 tablet (5 mg total) by mouth 3 (three) times a week   • metoprolol tartrate (LOPRESSOR) 25 mg tablet Take 1 tablet (25 mg total) by mouth every 12 (twelve) hours   • Multiple Vitamins-Minerals (OCUVITE-LUTEIN PO) Take 1 tablet by mouth 2 (two) times a day Pt is taking preservision    • omeprazole (PriLOSEC) 40 MG capsule Take 1 capsule (40 mg total) by mouth in the morning     • ondansetron (ZOFRAN) 4 mg tablet Take 1 tablet (4 mg total) by mouth every 8 (eight) hours as needed for nausea or vomiting   • OneTouch Verio test strip TEST ONCE A DAY   • sodium bicarbonate 650 mg tablet Take 2 tablets (1,300 mg total) by mouth 2 (two) times a day   • torsemide (DEMADEX) 20 mg tablet Take 1 tablet (20 mg total) by mouth daily   • traMADol (ULTRAM) 50 mg tablet TAKE ONE TABLET BY MOUTH EVERY 6 HOURS AS NEEDED FOR MODERATE PAIN   • vitamin E, tocopherol, 400 units capsule Take 400 Units by mouth daily     Allergies   Allergen Reactions   • Fentanyl Hallucinations   • Morphine And Related Other (See Comments)     While having an MI patient received morphine and had adverse reaction but doesn't know what happened  Immunization History   Administered Date(s) Administered   • COVID-19 MODERNA VACC 0 5 ML IM 01/18/2021, 02/15/2021   • Influenza Split High Dose Preservative Free IM 10/10/2013, 09/22/2014, 10/11/2016   • Influenza, high dose seasonal 0 7 mL 01/22/2019, 10/08/2019, 09/03/2020, 11/23/2021, 11/29/2022   • Pneumococcal Conjugate 13-Valent 05/23/2019   • Pneumococcal Polysaccharide PPV23 03/11/2014       Objective     /68 (BP Location: Left arm, Patient Position: Sitting, Cuff Size: Adult)   Pulse 61   Temp (!) 97 °F (36 1 °C) (Tympanic)   Resp 19   Ht 5' 7" (1 702 m)   Wt 83 9 kg (185 lb)   SpO2 99%   BMI 28 98 kg/m²     Physical Exam  Vitals and nursing note reviewed  Constitutional:       General: He is not in acute distress  Appearance: Normal appearance  He is not ill-appearing, toxic-appearing or diaphoretic  Comments: Pleasant cheerful 24-year-old male who is awake alert in no acute distress oriented x3 accompanied by his wife  Walking with a cane, walking stick   HENT:      Head: Normocephalic and atraumatic  Right Ear: Ear canal and external ear normal  There is no impacted cerumen  Left Ear: Tympanic membrane, ear canal and external ear normal  There is no impacted cerumen  Ears:      Comments: Some impacted cerumen to the right external canal   Denies any hearing difficulties     Nose: Nose normal  No congestion or rhinorrhea        Mouth/Throat:      Mouth: Mucous membranes are moist       Pharynx: No oropharyngeal exudate or posterior oropharyngeal erythema  Eyes:      General: No scleral icterus  Right eye: No discharge  Left eye: No discharge  Extraocular Movements: Extraocular movements intact  Conjunctiva/sclera: Conjunctivae normal       Pupils: Pupils are equal, round, and reactive to light  Neck:      Vascular: No carotid bruit  Cardiovascular:      Rate and Rhythm: Normal rate and regular rhythm  Heart sounds: Normal heart sounds  No murmur heard  No friction rub  No gallop  Pulmonary:      Effort: Pulmonary effort is normal  No respiratory distress  Breath sounds: Normal breath sounds  No stridor  No wheezing, rhonchi or rales  Chest:      Chest wall: No tenderness  Abdominal:      General: Bowel sounds are normal  There is no distension  Palpations: Abdomen is soft  There is no mass  Tenderness: There is no abdominal tenderness  There is no right CVA tenderness, left CVA tenderness, guarding or rebound  Hernia: No hernia is present  Musculoskeletal:         General: Deformity present  No swelling, tenderness or signs of injury  Normal range of motion  Cervical back: Normal range of motion and neck supple  No rigidity or tenderness  Right lower leg: No edema  Left lower leg: No edema  Comments: Some diffuse arthritic changes as noted previously with nothing acute  Patient has no edema today to lower extremities   Lymphadenopathy:      Cervical: No cervical adenopathy  Skin:     General: Skin is warm and dry  Coloration: Skin is not jaundiced or pale  Findings: No bruising, erythema, lesion or rash  Neurological:      Mental Status: He is alert and oriented to person, place, and time  Mental status is at baseline  Cranial Nerves: No cranial nerve deficit  Sensory: No sensory deficit  Motor: No weakness        Coordination: Coordination normal       Gait: Gait normal       Deep Tendon Reflexes: Reflexes normal       Comments: Patient is awake alert oriented  Able to carry out normal conversation without difficulty  Psychiatric:         Mood and Affect: Mood normal          Behavior: Behavior normal          Thought Content:  Thought content normal          Judgment: Judgment normal        Amelia Petty DO

## 2023-02-07 NOTE — ASSESSMENT & PLAN NOTE
History of hyperlipidemia  Patient remains on atorvastatin 40 mg daily  History of coronary artery disease  We did reinforce the importance of watching his diet including restriction of his intake of fats and cholesterol  He will continue to have surveillance through our office and with cardiology

## 2023-02-07 NOTE — ASSESSMENT & PLAN NOTE
Patient's blood pressure is showing adequate control with present treatment  Along with his nephrologist oncologist and cardiologist we continue to monitor renal function  Patient will continue present medication and surveillance

## 2023-02-07 NOTE — ASSESSMENT & PLAN NOTE
Wt Readings from Last 3 Encounters:   02/07/23 83 9 kg (185 lb)   02/01/23 82 kg (180 lb 12 4 oz)   01/20/23 83 5 kg (184 lb)     With the help of cardiology, nephrology we have been able to keep the patient out of failure  States that he still has some slight shortness of breath with exertion but is not as severe as in the past   Continues to take diuretics as prescribed  Renal function is stable

## 2023-02-07 NOTE — ASSESSMENT & PLAN NOTE
Lab Results   Component Value Date    EGFR 18 01/30/2023    EGFR 22 01/16/2023    EGFR 17 01/13/2023    CREATININE 2 95 (H) 01/30/2023    CREATININE 2 55 (H) 01/16/2023    CREATININE 3 21 (H) 01/13/2023   Again despite high doses of diuretics his renal function is stable  We will continue to follow-up with nephrology

## 2023-02-14 ENCOUNTER — TELEPHONE (OUTPATIENT)
Dept: NEPHROLOGY | Facility: CLINIC | Age: 83
End: 2023-02-14

## 2023-02-14 ENCOUNTER — LAB (OUTPATIENT)
Dept: LAB | Facility: CLINIC | Age: 83
End: 2023-02-14

## 2023-02-14 DIAGNOSIS — D63.1 ANEMIA OF CHRONIC RENAL FAILURE, UNSPECIFIED CKD STAGE: ICD-10-CM

## 2023-02-14 DIAGNOSIS — E11.3293 TYPE 2 DIABETES MELLITUS WITH BOTH EYES AFFECTED BY MILD NONPROLIFERATIVE RETINOPATHY WITHOUT MACULAR EDEMA, WITHOUT LONG-TERM CURRENT USE OF INSULIN (HCC): ICD-10-CM

## 2023-02-14 DIAGNOSIS — N17.9 ACUTE KIDNEY INJURY (HCC): ICD-10-CM

## 2023-02-14 DIAGNOSIS — C67.8 MALIGNANT NEOPLASM OF OVERLAPPING SITES OF BLADDER (HCC): ICD-10-CM

## 2023-02-14 DIAGNOSIS — N18.4 STAGE 4 CHRONIC KIDNEY DISEASE (HCC): ICD-10-CM

## 2023-02-14 DIAGNOSIS — N18.9 ANEMIA OF CHRONIC RENAL FAILURE, UNSPECIFIED CKD STAGE: ICD-10-CM

## 2023-02-14 LAB
ALBUMIN SERPL BCP-MCNC: 3.7 G/DL (ref 3.5–5)
ALP SERPL-CCNC: 89 U/L (ref 46–116)
ALT SERPL W P-5'-P-CCNC: 39 U/L (ref 12–78)
ANION GAP SERPL CALCULATED.3IONS-SCNC: 7 MMOL/L (ref 4–13)
AST SERPL W P-5'-P-CCNC: 25 U/L (ref 5–45)
BASOPHILS # BLD AUTO: 0.03 THOUSANDS/ÂΜL (ref 0–0.1)
BASOPHILS NFR BLD AUTO: 1 % (ref 0–1)
BILIRUB SERPL-MCNC: 0.47 MG/DL (ref 0.2–1)
BUN SERPL-MCNC: 53 MG/DL (ref 5–25)
CALCIUM SERPL-MCNC: 9.7 MG/DL (ref 8.3–10.1)
CHLORIDE SERPL-SCNC: 106 MMOL/L (ref 96–108)
CO2 SERPL-SCNC: 26 MMOL/L (ref 21–32)
CREAT SERPL-MCNC: 2.67 MG/DL (ref 0.6–1.3)
EOSINOPHIL # BLD AUTO: 0.06 THOUSAND/ÂΜL (ref 0–0.61)
EOSINOPHIL NFR BLD AUTO: 1 % (ref 0–6)
ERYTHROCYTE [DISTWIDTH] IN BLOOD BY AUTOMATED COUNT: 16.7 % (ref 11.6–15.1)
EST. AVERAGE GLUCOSE BLD GHB EST-MCNC: 143 MG/DL
FERRITIN SERPL-MCNC: 242 NG/ML (ref 8–388)
GFR SERPL CREATININE-BSD FRML MDRD: 21 ML/MIN/1.73SQ M
GLUCOSE P FAST SERPL-MCNC: 166 MG/DL (ref 65–99)
HBA1C MFR BLD: 6.6 %
HCT VFR BLD AUTO: 35.8 % (ref 36.5–49.3)
HGB BLD-MCNC: 11.7 G/DL (ref 12–17)
IMM GRANULOCYTES # BLD AUTO: 0.03 THOUSAND/UL (ref 0–0.2)
IMM GRANULOCYTES NFR BLD AUTO: 1 % (ref 0–2)
IRON SATN MFR SERPL: 25 % (ref 20–50)
IRON SERPL-MCNC: 70 UG/DL (ref 65–175)
LYMPHOCYTES # BLD AUTO: 0.75 THOUSANDS/ÂΜL (ref 0.6–4.47)
LYMPHOCYTES NFR BLD AUTO: 12 % (ref 14–44)
MCH RBC QN AUTO: 32.1 PG (ref 26.8–34.3)
MCHC RBC AUTO-ENTMCNC: 32.7 G/DL (ref 31.4–37.4)
MCV RBC AUTO: 98 FL (ref 82–98)
MONOCYTES # BLD AUTO: 0.86 THOUSAND/ÂΜL (ref 0.17–1.22)
MONOCYTES NFR BLD AUTO: 13 % (ref 4–12)
NEUTROPHILS # BLD AUTO: 4.78 THOUSANDS/ÂΜL (ref 1.85–7.62)
NEUTS SEG NFR BLD AUTO: 72 % (ref 43–75)
NRBC BLD AUTO-RTO: 0 /100 WBCS
PMV BLD AUTO: 11.4 FL (ref 8.9–12.7)
POTASSIUM SERPL-SCNC: 4.4 MMOL/L (ref 3.5–5.3)
PROT SERPL-MCNC: 7.1 G/DL (ref 6.4–8.4)
RBC # BLD AUTO: 3.65 MILLION/UL (ref 3.88–5.62)
SODIUM SERPL-SCNC: 139 MMOL/L (ref 135–147)
TIBC SERPL-MCNC: 285 UG/DL (ref 250–450)
WBC # BLD AUTO: 6.51 THOUSAND/UL (ref 4.31–10.16)

## 2023-02-14 RX ORDER — SODIUM CHLORIDE 9 MG/ML
20 INJECTION, SOLUTION INTRAVENOUS ONCE
Status: CANCELLED | OUTPATIENT
Start: 2023-03-09

## 2023-02-14 RX ORDER — SODIUM CHLORIDE 9 MG/ML
20 INJECTION, SOLUTION INTRAVENOUS ONCE
Status: CANCELLED | OUTPATIENT
Start: 2023-02-23

## 2023-02-14 NOTE — TELEPHONE ENCOUNTER
----- Message from Bernardo Jones MD sent at 2/14/2023  2:30 PM EST -----  Please inform patient that renal function is stable at creatinine 2 67 mg/dL and electrolytes are stable, continue same treatment, I believe currently on torsemide 20 mg daily

## 2023-02-14 NOTE — RESULT ENCOUNTER NOTE
Please inform patient that renal function is stable at creatinine 2 67 mg/dL and electrolytes are stable, continue same treatment, I believe currently on torsemide 20 mg daily

## 2023-02-14 NOTE — TELEPHONE ENCOUNTER
I spoke to Marie Cutler and made his aware that blood work is stable no changes are to be made at this time

## 2023-02-15 ENCOUNTER — TELEPHONE (OUTPATIENT)
Dept: NEPHROLOGY | Facility: CLINIC | Age: 83
End: 2023-02-15

## 2023-02-15 ENCOUNTER — TELEPHONE (OUTPATIENT)
Dept: HEMATOLOGY ONCOLOGY | Facility: CLINIC | Age: 83
End: 2023-02-15

## 2023-02-15 ENCOUNTER — HOSPITAL ENCOUNTER (OUTPATIENT)
Dept: INFUSION CENTER | Facility: HOSPITAL | Age: 83
Discharge: HOME | End: 2023-02-15
Attending: INTERNAL MEDICINE

## 2023-02-15 ENCOUNTER — OFFICE VISIT (OUTPATIENT)
Dept: HEMATOLOGY ONCOLOGY | Facility: CLINIC | Age: 83
End: 2023-02-15

## 2023-02-15 VITALS
HEART RATE: 70 BPM | RESPIRATION RATE: 18 BRPM | DIASTOLIC BLOOD PRESSURE: 74 MMHG | SYSTOLIC BLOOD PRESSURE: 134 MMHG | TEMPERATURE: 97.7 F

## 2023-02-15 VITALS
DIASTOLIC BLOOD PRESSURE: 80 MMHG | BODY MASS INDEX: 28.88 KG/M2 | HEIGHT: 67 IN | WEIGHT: 184 LBS | TEMPERATURE: 98 F | SYSTOLIC BLOOD PRESSURE: 138 MMHG | OXYGEN SATURATION: 98 % | HEART RATE: 41 BPM

## 2023-02-15 DIAGNOSIS — T45.1X5A CHEMOTHERAPY-INDUCED THROMBOCYTOPENIA: ICD-10-CM

## 2023-02-15 DIAGNOSIS — D69.59 CHEMOTHERAPY-INDUCED THROMBOCYTOPENIA: ICD-10-CM

## 2023-02-15 DIAGNOSIS — D49.4 BLADDER TUMOR: ICD-10-CM

## 2023-02-15 DIAGNOSIS — E43 SEVERE PROTEIN-CALORIE MALNUTRITION (HCC): ICD-10-CM

## 2023-02-15 DIAGNOSIS — D63.1 ANEMIA DUE TO STAGE 4 CHRONIC KIDNEY DISEASE (HCC): ICD-10-CM

## 2023-02-15 DIAGNOSIS — C67.8 MALIGNANT NEOPLASM OF OVERLAPPING SITES OF BLADDER (HCC): Primary | ICD-10-CM

## 2023-02-15 DIAGNOSIS — N18.4 ANEMIA DUE TO STAGE 4 CHRONIC KIDNEY DISEASE (HCC): ICD-10-CM

## 2023-02-15 DIAGNOSIS — D69.6 PLATELETS DECREASED (HCC): ICD-10-CM

## 2023-02-15 DIAGNOSIS — C67.8 MALIGNANT NEOPLASM OF OVERLAPPING SITES OF BLADDER (HCC): ICD-10-CM

## 2023-02-15 LAB
ANISOCYTOSIS BLD QL SMEAR: PRESENT
BASOPHILS NFR BLD MANUAL: 1 % (ref 0–1)
IMM EOSINOPHIL NFR BLD MANUAL: 3 % (ref 0–6)
LYMPHOCYTES NFR BLD: 5 % (ref 14–44)
MONOCYTES NFR BLD AUTO: 9 % (ref 4–12)
NEUTS SEG NFR BLD AUTO: 81 % (ref 45–77)
OVALOCYTES BLD QL SMEAR: PRESENT
PATHOLOGY REVIEW: YES
PLATELET BLD QL SMEAR: ABNORMAL
POIKILOCYTOSIS BLD QL SMEAR: PRESENT
POLYCHROMASIA BLD QL SMEAR: PRESENT
TOTAL CELLS COUNTED SPEC: 100
VARIANT LYMPHS BLD QL SMEAR: 1 % (ref 0–0)

## 2023-02-15 NOTE — PROGRESS NOTES
Patient's labs drawn with PIV placement  Results were not resulted at two hour darnell  Reached out to [facility] from Dr Blair Mcclure office to ask if they wanted him to keep waiting for results that may not occur today  Lab advised they needed their pathologist to look at specimen, but they were unsure when that would happen or how long the process might take  Dr Ras Perez chose to delay treatment  Pt updated and escorted to Dr Blair Mcclure office for his 1p appointment  AVS declined  Patient left ambulatory in stable condition

## 2023-02-15 NOTE — TELEPHONE ENCOUNTER
Called patient and left a voicemail stating the following information:    Patients iron panel reviewed and has improved with iron supplements  No change in current treatment       I asked if the patient had any questions to please call the office

## 2023-02-15 NOTE — TELEPHONE ENCOUNTER
----- Message from Rishi Kirby Do sent at 2/14/2023  2:37 PM EST -----  Please let patient know that iron panel reviewed and have improved with iron supplements  No change in current treatment

## 2023-02-15 NOTE — PROGRESS NOTES
Hematology/Oncology Outpatient Follow- up Note  Bobby Brooke 80 y o  male MRN: @ Encounter: [id number]        Date:  2/15/2023      Assessment / Plan:    43-year-old  male with superficial bladder cancers status post many BCG ease with refractoriness to BCG ease status post mitomycin instillation, cystoscopy on 04/2020 showed carcinoma in situ no evidence of invasive component     He received Pembrolizumab 200 mg flat dose every 3 weeks on 05/26/2020 after 2 cycles he had grade 3 elevation of the liver enzymes, thickening of the skin of the forearms consistent with toxicity to Pembrolizumab     Now with recurrent disease in the right iliac, pelvic area with extension into the aortic bifurcation      Evaluated at [address], we feel the patient is candidate for chemotherapy utilizing gemcitabine/carboplatin this is stage IV disease  Initiated on carboplatin/gemcitabine on 09/30/2022, CT scan on December 2022 showed no evidence of disease in the abdomen or pelvis     Therapy was complicated with anemia requiring Aranesp which has been beneficial         2/1/23 platelet count 11,892 on platelet clumping assessment  2/1/23  Cycle 5 D15 gemzar 800mg/m2  No carboplatin 2nd to thrombocytopenia  Plan to reduce carboplatin to AUC 2 with cycle 6    2/14/23-hemoglobin 11 7, MCV 98, white blood cell count 6 5, platelet count unable to be reported due to clumping    Cycle 6 2/15/23 -unable to assess platelet count even on platelet clumping assessment  Therefore he did not receive treatment today  Will R/S treatment  CKD  Cr 2 2-3 3 since 1/2021    F/U noncontrast CT scan requested after 6th cycle  Anticipate maintenance avelumab thereafter              HPI:   Luis Elias is an 43-year-old  male seen initially 5/2020 regarding recurrent superficial bladder cancer status post many BCG treatments and had undergone transurethral resection of the bladder tumor and mitomycin instillation  History of hypertension, coronary artery disease status post open heart surgery, diabetes mellitus type 2, diabetic neuropathy  Cystoscopy on 04/16/2020 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, no evidence of invasive bladder cancers     He does not smoke, he drinks bourbon and vodka every night     Denies any headache blurred vision nausea vomiting diarrhea constipation dysuria hematuria melena hematochezia heat or cold intolerance    Initiated on Pembrolizumab 200 mg flat dose every 3 weeks on 05/26/2020, after 2 doses there was grade 3 elevation in the liver enzyme, alkaline phosphatase requiring discontinuation of Pembrolizumab, and later on normalization of the liver enzymes     Evaluated by ID there was no evidence of TB of the liver    Made prn 9/8/2020  Status post radical cystoprostatectomy to ileal conduit urinary diversion on 10/26/2020    Urethral Biopsy 4/5/22  A  Urethra (biopsy):  - High grade, non-invasive papillary urothelial carcinoma   - No muscularis propria identified      Had been followed by Dr Delicia Barrientos, urologist at [address]    9/13/22 CT scan abdomen at [address]- Few mildly enlarged mesenteric and retroperitoneal lymph nodes  Scattered small nodules at the lung bases, favored to be benign  Represented 9/20/22 9/30/22 Carboplatin AUC 4 day 1, gemcitabine 1000 mg per m2 on days 1 and 8 every 21 days initiated  Treatment changed to gemcitabine 800 mg per m2, carboplatin AUC 4 q 2 weeks with cycle 5 1/19/23         Molecular tests showed TERT ARID1A mutation which he might be a candidate for PARP inhibitor in the future     CT scan on 11/30/22 showed no evidence of disease in the abdomen or pelvis  Treatment complicated with anemia    Hemoglobin decreased from mid 11 g/dL range to 8 2 11/29/22    For 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 with exacerbation of congestive heart failure by anemia, Initiated on Aranesp 200mcg q 2 weeks  1/20/23- surveillance ureteroscopy per Dr Jrarett Martinez-  "Unfortunately I was unable to evaluate at least two thirds if not three quarters of his urethral length  I discussed cystoscopy in the operating room with dilation along with fulguration and biopsy of any residual lesions  He is not interested in aggressive surgical intervention at this time  He is hopeful that the chemotherapy is working not only on his retroperitoneal adenopathy but also on the disease within the urethra  He defers cystoscopy in the operating room at this time  He understands that without cystoscopy I cannot adequately assess if he has recurrent disease  He understands this risk and wishes to return in 3 months time "           Interval History  2/1/23 platelet count 93,229 on platelet clumping assessment  2/1/23  Cycle 5 D15 gemzar 800mg/m2  No carboplatin 2nd to thrombocytopenia  Plan to reduce carboplatin to AUC 2 with cycle 6    2/14/23-hemoglobin 11 7, MCV 98, white blood cell count 6 5, platelet count unable to be reported due to clumping    Cycle 6 2/15/23 -        Molecular tests:     TERT, ARID1A mutation       Interval History:        Review of Systems   Constitutional: Negative for appetite change, chills, diaphoresis, fatigue, fever and unexpected weight change  HENT:   Negative for mouth sores, nosebleeds, sore throat, tinnitus and voice change  Eyes: Negative for eye problems  Respiratory: Negative for chest tightness, cough, shortness of breath and wheezing  Cardiovascular: Negative for chest pain, leg swelling and palpitations  Gastrointestinal: Negative for abdominal distention, abdominal pain, blood in stool and constipation  Endocrine: Negative for hot flashes  Genitourinary: Negative  Negative for difficulty urinating, dysuria and hematuria      Musculoskeletal: Negative for gait problem and myalgias  Skin: Negative for itching and rash  Neurological: Negative for dizziness, gait problem, headaches, light-headedness and numbness  Hematological: Negative for adenopathy  Psychiatric/Behavioral: Negative for confusion and sleep disturbance  The patient is not nervous/anxious  Test Results:        Labs:   Lab Results   Component Value Date    HGB 11 7 (L) 02/14/2023    HCT 35 8 (L) 02/14/2023    MCV 98 02/14/2023    PLT  02/14/2023      Comment:      Not reported due to clumped platelets  This is a corrected result  Previous result was 107 Thousands/uL on 2/14/2023 at 1212 EST    WBC 6 51 02/14/2023    NRBC 0 02/14/2023    ATYLMPCT 5 (H) 02/21/2021     Lab Results   Component Value Date     08/13/2015    K 4 4 02/14/2023     02/14/2023    CO2 26 02/14/2023    ANIONGAP 10 08/13/2015    BUN 53 (H) 02/14/2023    CREATININE 2 67 (H) 02/14/2023    GLUCOSE 128 08/13/2015    GLUF 166 (H) 02/14/2023    CALCIUM 9 7 02/14/2023    CORRECTEDCA 9 6 12/27/2022    AST 25 02/14/2023    ALT 39 02/14/2023    ALKPHOS 89 02/14/2023    PROT 6 8 08/13/2015    BILITOT 0 58 08/13/2015    EGFR 21 02/14/2023           Imaging: No results found  Allergies: Allergies   Allergen Reactions   • Fentanyl Hallucinations   • Morphine And Related Other (See Comments)     While having an MI patient received morphine and had adverse reaction but doesn't know what happened  Current Medications: Reviewed  PMH/FH/SH:  Reviewed      Physical Exam:    There is no height or weight on file to calculate BSA      Ht Readings from Last 3 Encounters:   02/07/23 5' 7" (1 702 m)   02/01/23 5' 7 52" (1 715 m)   01/20/23 5' 7 5" (1 715 m)        Wt Readings from Last 3 Encounters:   02/07/23 83 9 kg (185 lb)   02/01/23 82 kg (180 lb 12 4 oz)   01/20/23 83 5 kg (184 lb)        Temp Readings from Last 3 Encounters:   02/15/23 97 7 °F (36 5 °C)   02/07/23 (!) 97 °F (36 1 °C) (Tympanic)   02/01/23 (!) 95 °F (35 °C) (Temporal)        BP Readings from Last 3 Encounters:   02/15/23 134/74   23 130/68   23 160/71             Physical Exam  Vitals reviewed  Constitutional:       General: He is not in acute distress  Appearance: He is well-developed  He is not diaphoretic  HENT:      Head: Normocephalic and atraumatic  Eyes:      Conjunctiva/sclera: Conjunctivae normal    Neck:      Trachea: No tracheal deviation  Cardiovascular:      Rate and Rhythm: Normal rate and regular rhythm  Heart sounds: No murmur heard  No friction rub  No gallop  Pulmonary:      Effort: Pulmonary effort is normal  No respiratory distress  Breath sounds: Normal breath sounds  No wheezing or rales  Chest:      Chest wall: No tenderness  Abdominal:      General: There is no distension  Palpations: Abdomen is soft  Tenderness: There is no abdominal tenderness  Musculoskeletal:      Cervical back: Normal range of motion and neck supple  Comments: cane   Lymphadenopathy:      Cervical: No cervical adenopathy  Skin:     General: Skin is warm and dry  Coloration: Skin is not pale  Findings: No erythema  Neurological:      Mental Status: He is alert and oriented to person, place, and time  Psychiatric:         Behavior: Behavior normal          Thought Content:  Thought content normal          Judgment: Judgment normal          ECO      Emergency Contacts:    Extended Emergency Contact Information  Primary Emergency Contact: Sheyla Wheeler  Address: [address] [place] [address] Phone: [phone]  Mobile Phone: [phone]  Relation: Spouse  Secondary Emergency Contact: Dorota Kee   [place]  Mobile Phone: [phone]  Relation: Daughter

## 2023-02-15 NOTE — TELEPHONE ENCOUNTER
While we try to accommodate patient requests, our priority is to schedule treatment according to Doctor's orders and site availability  1  Does the Provider use the intake sheet or checkout note? 2  What would be a preferred day of the week that would work best for your infusion appointment? Thursday  3  Do you prefer mornings or afternoons for your appointments? Morning- 10:00 or later  4  Are there any days or dates that do not work for your schedule, including any upcoming vacations? No  5  We are going to try our best to schedule you at the infusion center closest to your home  In the event that we are unable to what would be your next preferred infusion site or sites? 1  [place]  [place]    6  Do you have transportation to take you to all of your appointments? Yes  7   Would you like the infusion center to draw labs from your port? (disregard if patient doesn't have a port or need labs for infusion appointment)

## 2023-02-21 ENCOUNTER — APPOINTMENT (OUTPATIENT)
Dept: LAB | Facility: CLINIC | Age: 83
End: 2023-02-21

## 2023-02-21 DIAGNOSIS — C67.8 MALIGNANT NEOPLASM OF OVERLAPPING SITES OF BLADDER (HCC): ICD-10-CM

## 2023-02-21 LAB
ALBUMIN SERPL BCP-MCNC: 3.6 G/DL (ref 3.5–5)
ALP SERPL-CCNC: 90 U/L (ref 46–116)
ALT SERPL W P-5'-P-CCNC: 30 U/L (ref 12–78)
ANION GAP SERPL CALCULATED.3IONS-SCNC: 7 MMOL/L (ref 4–13)
AST SERPL W P-5'-P-CCNC: 19 U/L (ref 5–45)
BASOPHILS # BLD AUTO: 0.04 THOUSANDS/ÂΜL (ref 0–0.1)
BASOPHILS NFR BLD AUTO: 1 % (ref 0–1)
BILIRUB SERPL-MCNC: 0.54 MG/DL (ref 0.2–1)
BUN SERPL-MCNC: 59 MG/DL (ref 5–25)
CALCIUM SERPL-MCNC: 9.4 MG/DL (ref 8.3–10.1)
CHLORIDE SERPL-SCNC: 104 MMOL/L (ref 96–108)
CO2 SERPL-SCNC: 25 MMOL/L (ref 21–32)
CREAT SERPL-MCNC: 3.32 MG/DL (ref 0.6–1.3)
EOSINOPHIL # BLD AUTO: 0.05 THOUSAND/ÂΜL (ref 0–0.61)
EOSINOPHIL NFR BLD AUTO: 1 % (ref 0–6)
ERYTHROCYTE [DISTWIDTH] IN BLOOD BY AUTOMATED COUNT: 17.2 % (ref 11.6–15.1)
GFR SERPL CREATININE-BSD FRML MDRD: 16 ML/MIN/1.73SQ M
GLUCOSE P FAST SERPL-MCNC: 259 MG/DL (ref 65–99)
HCT VFR BLD AUTO: 35.4 % (ref 36.5–49.3)
HGB BLD-MCNC: 11.4 G/DL (ref 12–17)
IMM GRANULOCYTES # BLD AUTO: 0.07 THOUSAND/UL (ref 0–0.2)
IMM GRANULOCYTES NFR BLD AUTO: 1 % (ref 0–2)
LYMPHOCYTES # BLD AUTO: 0.55 THOUSANDS/ÂΜL (ref 0.6–4.47)
LYMPHOCYTES NFR BLD AUTO: 7 % (ref 14–44)
MCH RBC QN AUTO: 31.8 PG (ref 26.8–34.3)
MCHC RBC AUTO-ENTMCNC: 32.2 G/DL (ref 31.4–37.4)
MCV RBC AUTO: 99 FL (ref 82–98)
MONOCYTES # BLD AUTO: 1.19 THOUSAND/ÂΜL (ref 0.17–1.22)
MONOCYTES NFR BLD AUTO: 15 % (ref 4–12)
NEUTROPHILS # BLD AUTO: 6.03 THOUSANDS/ÂΜL (ref 1.85–7.62)
NEUTS SEG NFR BLD AUTO: 75 % (ref 43–75)
NRBC BLD AUTO-RTO: 0 /100 WBCS
PLATELET # BLD AUTO: 234 THOUSANDS/UL (ref 149–390)
PMV BLD AUTO: 12.2 FL (ref 8.9–12.7)
POTASSIUM SERPL-SCNC: 4.4 MMOL/L (ref 3.5–5.3)
PROT SERPL-MCNC: 6.9 G/DL (ref 6.4–8.4)
RBC # BLD AUTO: 3.58 MILLION/UL (ref 3.88–5.62)
SODIUM SERPL-SCNC: 136 MMOL/L (ref 135–147)
WBC # BLD AUTO: 7.93 THOUSAND/UL (ref 4.31–10.16)

## 2023-02-22 ENCOUNTER — TELEPHONE (OUTPATIENT)
Dept: OTHER | Facility: HOSPITAL | Age: 83
End: 2023-02-22

## 2023-02-22 DIAGNOSIS — N18.4 CKD (CHRONIC KIDNEY DISEASE) STAGE 4, GFR 15-29 ML/MIN (HCC): ICD-10-CM

## 2023-02-22 DIAGNOSIS — E87.20 METABOLIC ACIDOSIS: Primary | ICD-10-CM

## 2023-02-22 DIAGNOSIS — R79.89 ELEVATED SERUM CREATININE: ICD-10-CM

## 2023-02-22 NOTE — TELEPHONE ENCOUNTER
Cr 3 32 discussed with patient  As per patient - has trace swelling in legs and foot  BP stable   Plan:   - BMP in one week    We will continue same dose of torsemide for now he has trace lower extremity edema but if renal function continue to worsen may need to decrease the dose of torsemide

## 2023-02-23 ENCOUNTER — HOSPITAL ENCOUNTER (OUTPATIENT)
Dept: INFUSION CENTER | Facility: HOSPITAL | Age: 83
End: 2023-02-23
Attending: INTERNAL MEDICINE

## 2023-02-23 ENCOUNTER — TELEPHONE (OUTPATIENT)
Dept: HEMATOLOGY ONCOLOGY | Facility: CLINIC | Age: 83
End: 2023-02-23

## 2023-02-23 VITALS
WEIGHT: 183.86 LBS | OXYGEN SATURATION: 100 % | HEART RATE: 55 BPM | SYSTOLIC BLOOD PRESSURE: 144 MMHG | DIASTOLIC BLOOD PRESSURE: 64 MMHG | TEMPERATURE: 98.2 F | RESPIRATION RATE: 18 BRPM | BODY MASS INDEX: 27.87 KG/M2 | HEIGHT: 68 IN

## 2023-02-23 DIAGNOSIS — R82.89 POSITIVE URINARY CYTOLOGY: ICD-10-CM

## 2023-02-23 DIAGNOSIS — C67.5 MALIGNANT NEOPLASM OF URINARY BLADDER NECK (HCC): ICD-10-CM

## 2023-02-23 DIAGNOSIS — C67.8 MALIGNANT NEOPLASM OF OVERLAPPING SITES OF BLADDER (HCC): ICD-10-CM

## 2023-02-23 DIAGNOSIS — D63.1 ANEMIA DUE TO STAGE 4 CHRONIC KIDNEY DISEASE (HCC): ICD-10-CM

## 2023-02-23 DIAGNOSIS — D49.4 BLADDER TUMOR: ICD-10-CM

## 2023-02-23 DIAGNOSIS — C67.8 MALIGNANT NEOPLASM OF OVERLAPPING SITES OF BLADDER (HCC): Primary | ICD-10-CM

## 2023-02-23 DIAGNOSIS — N18.4 ANEMIA DUE TO STAGE 4 CHRONIC KIDNEY DISEASE (HCC): Primary | ICD-10-CM

## 2023-02-23 DIAGNOSIS — D63.1 ANEMIA DUE TO STAGE 4 CHRONIC KIDNEY DISEASE (HCC): Primary | ICD-10-CM

## 2023-02-23 DIAGNOSIS — N18.4 ANEMIA DUE TO STAGE 4 CHRONIC KIDNEY DISEASE (HCC): ICD-10-CM

## 2023-02-23 RX ORDER — SODIUM CHLORIDE 9 MG/ML
20 INJECTION, SOLUTION INTRAVENOUS ONCE
Status: COMPLETED | OUTPATIENT
Start: 2023-02-23 | End: 2023-02-23

## 2023-02-23 RX ADMIN — CARBOPLATIN 86.8 MG: 10 INJECTION, SOLUTION INTRAVENOUS at 13:55

## 2023-02-23 RX ADMIN — SODIUM CHLORIDE 20 ML/HR: 0.9 INJECTION, SOLUTION INTRAVENOUS at 12:03

## 2023-02-23 RX ADMIN — FOSAPREPITANT 150 MG: 150 INJECTION, POWDER, LYOPHILIZED, FOR SOLUTION INTRAVENOUS at 12:28

## 2023-02-23 RX ADMIN — DEXAMETHASONE SODIUM PHOSPHATE: 10 INJECTION, SOLUTION INTRAMUSCULAR; INTRAVENOUS at 12:06

## 2023-02-23 RX ADMIN — GEMCITABINE 1600 MG: 38 INJECTION, SOLUTION INTRAVENOUS at 13:08

## 2023-02-23 NOTE — PROGRESS NOTES
Pt tolerated Gemzar/ Carbo today with no adverse reactions  Hgb 11 4, Aranesp not given per parameters  Declined AVS  Left unit ambulatory with a steady gait

## 2023-02-23 NOTE — PROGRESS NOTES
Previous Gemcitabine + Carboplatin plan was d/c  Cycle #4 day #1 was given on 12/01/2022  Cycle #4 day #8 was held due to Anemia       New plan was entered on 01/04/2023, technically starting with cycle #5 on 01/19/2023

## 2023-03-07 ENCOUNTER — APPOINTMENT (OUTPATIENT)
Dept: LAB | Facility: CLINIC | Age: 83
End: 2023-03-07

## 2023-03-07 DIAGNOSIS — C67.8 MALIGNANT NEOPLASM OF OVERLAPPING SITES OF BLADDER (HCC): ICD-10-CM

## 2023-03-07 LAB
ALBUMIN SERPL BCP-MCNC: 4 G/DL (ref 3.5–5)
ALP SERPL-CCNC: 97 U/L (ref 46–116)
ALT SERPL W P-5'-P-CCNC: 42 U/L (ref 12–78)
ANION GAP SERPL CALCULATED.3IONS-SCNC: 3 MMOL/L (ref 4–13)
AST SERPL W P-5'-P-CCNC: 26 U/L (ref 5–45)
BASOPHILS # BLD AUTO: 0.02 THOUSANDS/ÂΜL (ref 0–0.1)
BASOPHILS NFR BLD AUTO: 0 % (ref 0–1)
BILIRUB SERPL-MCNC: 0.49 MG/DL (ref 0.2–1)
BUN SERPL-MCNC: 63 MG/DL (ref 5–25)
CALCIUM SERPL-MCNC: 9.5 MG/DL (ref 8.3–10.1)
CHLORIDE SERPL-SCNC: 104 MMOL/L (ref 96–108)
CO2 SERPL-SCNC: 25 MMOL/L (ref 21–32)
CREAT SERPL-MCNC: 2.6 MG/DL (ref 0.6–1.3)
EOSINOPHIL # BLD AUTO: 0.05 THOUSAND/ÂΜL (ref 0–0.61)
EOSINOPHIL NFR BLD AUTO: 1 % (ref 0–6)
ERYTHROCYTE [DISTWIDTH] IN BLOOD BY AUTOMATED COUNT: 18.1 % (ref 11.6–15.1)
GFR SERPL CREATININE-BSD FRML MDRD: 21 ML/MIN/1.73SQ M
GLUCOSE P FAST SERPL-MCNC: 141 MG/DL (ref 65–99)
HCT VFR BLD AUTO: 35.1 % (ref 36.5–49.3)
HGB BLD-MCNC: 11.6 G/DL (ref 12–17)
IMM GRANULOCYTES # BLD AUTO: 0.08 THOUSAND/UL (ref 0–0.2)
IMM GRANULOCYTES NFR BLD AUTO: 1 % (ref 0–2)
LYMPHOCYTES # BLD AUTO: 0.65 THOUSANDS/ÂΜL (ref 0.6–4.47)
LYMPHOCYTES NFR BLD AUTO: 7 % (ref 14–44)
MCH RBC QN AUTO: 31.6 PG (ref 26.8–34.3)
MCHC RBC AUTO-ENTMCNC: 33 G/DL (ref 31.4–37.4)
MCV RBC AUTO: 96 FL (ref 82–98)
MONOCYTES # BLD AUTO: 0.86 THOUSAND/ÂΜL (ref 0.17–1.22)
MONOCYTES NFR BLD AUTO: 10 % (ref 4–12)
NEUTROPHILS # BLD AUTO: 7.41 THOUSANDS/ÂΜL (ref 1.85–7.62)
NEUTS SEG NFR BLD AUTO: 81 % (ref 43–75)
NRBC BLD AUTO-RTO: 0 /100 WBCS
PLATELET # BLD AUTO: 101 THOUSANDS/UL (ref 149–390)
PMV BLD AUTO: 11.8 FL (ref 8.9–12.7)
POTASSIUM SERPL-SCNC: 4.1 MMOL/L (ref 3.5–5.3)
PROT SERPL-MCNC: 7.4 G/DL (ref 6.4–8.4)
RBC # BLD AUTO: 3.67 MILLION/UL (ref 3.88–5.62)
SODIUM SERPL-SCNC: 132 MMOL/L (ref 135–147)
WBC # BLD AUTO: 9.07 THOUSAND/UL (ref 4.31–10.16)

## 2023-03-09 ENCOUNTER — HOSPITAL ENCOUNTER (OUTPATIENT)
Dept: INFUSION CENTER | Facility: HOSPITAL | Age: 83
Discharge: HOME | End: 2023-03-09
Attending: INTERNAL MEDICINE

## 2023-03-09 ENCOUNTER — OFFICE VISIT (OUTPATIENT)
Dept: CARDIOLOGY CLINIC | Facility: CLINIC | Age: 83
End: 2023-03-09

## 2023-03-09 VITALS
WEIGHT: 183.86 LBS | RESPIRATION RATE: 18 BRPM | SYSTOLIC BLOOD PRESSURE: 169 MMHG | DIASTOLIC BLOOD PRESSURE: 59 MMHG | BODY MASS INDEX: 27.87 KG/M2 | HEIGHT: 68 IN | HEART RATE: 48 BPM | TEMPERATURE: 97.4 F

## 2023-03-09 VITALS
SYSTOLIC BLOOD PRESSURE: 138 MMHG | WEIGHT: 183 LBS | OXYGEN SATURATION: 99 % | HEART RATE: 52 BPM | BODY MASS INDEX: 28.22 KG/M2 | DIASTOLIC BLOOD PRESSURE: 52 MMHG

## 2023-03-09 DIAGNOSIS — I12.9 BENIGN HYPERTENSION WITH CHRONIC KIDNEY DISEASE, STAGE IV (HCC): ICD-10-CM

## 2023-03-09 DIAGNOSIS — I25.5 ISCHEMIC CARDIOMYOPATHY: ICD-10-CM

## 2023-03-09 DIAGNOSIS — I25.10 CORONARY ARTERY DISEASE INVOLVING NATIVE CORONARY ARTERY OF NATIVE HEART WITHOUT ANGINA PECTORIS: ICD-10-CM

## 2023-03-09 DIAGNOSIS — C67.8 MALIGNANT NEOPLASM OF OVERLAPPING SITES OF BLADDER (HCC): Primary | ICD-10-CM

## 2023-03-09 DIAGNOSIS — I10 BENIGN ESSENTIAL HYPERTENSION: ICD-10-CM

## 2023-03-09 DIAGNOSIS — N18.4 BENIGN HYPERTENSION WITH CHRONIC KIDNEY DISEASE, STAGE IV (HCC): ICD-10-CM

## 2023-03-09 DIAGNOSIS — I45.10 RBBB: Primary | ICD-10-CM

## 2023-03-09 DIAGNOSIS — Z95.1 HX OF CABG: ICD-10-CM

## 2023-03-09 PROBLEM — I50.23 ACUTE ON CHRONIC SYSTOLIC CONGESTIVE HEART FAILURE (HCC): Status: RESOLVED | Noted: 2023-01-11 | Resolved: 2023-03-09

## 2023-03-09 RX ORDER — SODIUM CHLORIDE 9 MG/ML
20 INJECTION, SOLUTION INTRAVENOUS ONCE
Status: COMPLETED | OUTPATIENT
Start: 2023-03-09 | End: 2023-03-09

## 2023-03-09 RX ADMIN — FOSAPREPITANT 150 MG: 150 INJECTION, POWDER, LYOPHILIZED, FOR SOLUTION INTRAVENOUS at 12:59

## 2023-03-09 RX ADMIN — SODIUM CHLORIDE 20 ML/HR: 0.9 INJECTION, SOLUTION INTRAVENOUS at 12:24

## 2023-03-09 RX ADMIN — GEMCITABINE 1600 MG: 38 INJECTION, SOLUTION INTRAVENOUS at 13:33

## 2023-03-09 RX ADMIN — DEXAMETHASONE SODIUM PHOSPHATE: 10 INJECTION, SOLUTION INTRAMUSCULAR; INTRAVENOUS at 12:24

## 2023-03-09 RX ADMIN — CARBOPLATIN 97 MG: 10 INJECTION, SOLUTION INTRAVENOUS at 14:10

## 2023-03-09 NOTE — PROGRESS NOTES
Cardiology Follow Up    [address] [place] [address]  1940  [id number]  [address] [facility] [address] [phone]    1  RBBB        2  Ischemic cardiomyopathy        3  Coronary artery disease involving native coronary artery of native heart without angina pectoris        4  Benign hypertension with chronic kidney disease, stage IV ([place] [address] )        5  Benign essential hypertension        6  Hx of CABG              Discussion/Summary: All of his assessed cardiac problems are stable  I have reviewed his medications and made no changes  I strongly advised that he weigh himself daily and keep a log and call if his weight increased 3-4 lbs above his present weight  No cardiac testing is ordered  RTO 4 months  Interval History: He has not had any cardiac problems since his last OV  He is not weighing himself daily  His weight is up from 181 to 183 lbs  He is not very active  He denies CP, SOB, LE edema  He has CAD with prior CABG  EF is 45%, PASP 58 mmHG  Creatinine is stable at 2 6  Na+ 132  BUN 63    He is taking Torsemide 20 mg daily      Patient Active Problem List   Diagnosis   • Arteriosclerotic cardiovascular disease   • Backache   • Benign essential hypertension   • DMII (diabetes mellitus, type 2) ([place] [address] )   • Hyperlipidemia   • Wright's esophagus with esophagitis   • Acute kidney injury ([place] [address] )   • Overactive bladder   • Bladder tumor   • 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)   • Hx of CABG   • Malignant neoplasm of overlapping sites of bladder ([facility] )   • Closed fracture of upper end of right fibula   • History of bladder cancer   • Coronary artery disease involving native coronary artery of native heart without angina pectoris   • Ischemic cardiomyopathy   • Positive urinary cytology   • 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) • Liver function study, abnormal   • Elevated troponin   • Forearm mass, left   • Fungal dermatitis   • Anxiety and depression   • Overweight   • Ambulatory dysfunction   • Frequent falls   • Right sided Pelvic abscess in male [facility])   • Severe protein-calorie malnutrition (HCC)   • Metabolic acidosis   • Hypomagnesemia   • RBBB   • Benign hypertension with chronic kidney disease, stage IV (HCC)   • Persistent proteinuria   • Anemia   • Chronic kidney disease-mineral and bone disorder   • Visual hallucinations   • Functional diarrhea   • Platelets decreased (HCC)   • Urethral tumor   • Secondary hyperparathyroidism of renal origin ([facility] )   • Stage 4 chronic kidney disease (HCC)   • Anemia due to stage 4 chronic kidney disease (HCC)   • Shortness of breath   • Chemotherapy-induced thrombocytopenia     Past Medical History:   Diagnosis Date   • Acute kidney injury ([email or web address] )    • Anemia Jan 2022   • Arthritis     Hands   • Wright esophagus    • BPH with obstruction/lower urinary tract symptoms    • CAD (coronary artery disease)     Last assessed 09/16/15   • Cancer ([facility] )     bladder   • Cataract, acquired     Last assessed 10/10/17   • Chronic kidney disease    • Coronary artery disease    • Diabetes mellitus (HCC)     NIDDM   • Diabetic neuropathy (HCC)     Feet   • Enlarged prostate with lower urinary tract symptoms (LUTS)     Last assessed 10/10/17   • Erectile dysfunction    • GERD (gastroesophageal reflux disease)     Last assessed 10/10/17   • Hemoptysis     Last assessed 03/14/16   • Hypercholesterolemia     Last assessed 10/10/17   • Hypertension     Last assessed 10/10/17   • Kidney stone    • Macular degeneration     Right eye is particularly affected-peripheral vision intact   • Myocardial infarction [facility]) 1998   • OAB (overactive bladder)    • Testicular hypofunction    • Testicular hypogonadism     Last assessed 10/10/17   • Tinnitus    • Umbilical hernia     Last assessed 06/18/14   • Urge incontinence of urine    • Wears glasses      Social History     Socioeconomic History   • Marital status: /Civil Union     Spouse name: Not on file   • Number of children: Not on file   • Years of education: Not on file   • Highest education level: Not on file   Occupational History   • Occupation: Sales position   Tobacco Use   • Smoking status: Former     Packs/day: 1 00     Years: 10 00     Pack years: 10 00     Types: Cigarettes     Quit date: 1972     Years since quittin 2   • Smokeless tobacco: Never   • Tobacco comments:     Quit at age 28   Vaping Use   • Vaping Use: Never used   Substance and Sexual Activity   • Alcohol use: Not Currently     Alcohol/week: 2 0 standard drinks     Types: 1 Glasses of wine, 1 Cans of beer per week     Comment: Non since 7/15/2020   • Drug use: Never   • Sexual activity: Not Currently     Partners: Female   Other Topics Concern   • Not on file   Social History Narrative    Always uses seatbelt        Caffeine use- Drinks 2 cups of coffee daily     Social Determinants of Health     Financial Resource Strain: Low Risk    • Difficulty of Paying Living Expenses: Not very hard   Food Insecurity: Not on file   Transportation Needs: No Transportation Needs   • Lack of Transportation (Medical): No   • Lack of Transportation (Non-Medical):  No   Physical Activity: Not on file   Stress: Not on file   Social Connections: Not on file   Intimate Partner Violence: Not on file   Housing Stability: Not on file      Family History   Problem Relation Age of Onset   • Cancer Mother         Topical oral abrasian caused cancer   • Other Mother         Digestive System Complications   • Diabetes Father    • Heart disease Father    • Hypertension Father    • Coronary artery disease Father    • Hyperlipidemia Father      Past Surgical History:   Procedure Laterality Date   • BLADDER SURGERY     • COLONOSCOPY     • CORONARY ARTERY BYPASS GRAFT  2014    ABG x 4 LIMA to LAD,SVG to diagnoal 2 SVG to OM-1, SVG to PDA, resection of partial plerual mass   • CT GUIDED PERC DRAINAGE CATHETER PLACEMENT  02/19/2021   • CYSTOSCOPY  2013   • CYSTOSCOPY  02/08/2022    Olya   • ESOPHAGOGASTRODUODENOSCOPY     • FL RETROGRADE PYELOGRAM  12/20/2018   • FL RETROGRADE PYELOGRAM  12/05/2019   • INGUINAL HERNIA REPAIR Bilateral 2015   • IR DRAINAGE TUBE CHECK AND/OR REMOVAL  03/05/2021   • PHOTODYNAMIC THERAPY      For Barretts esophagus   • AL COLONOSCOPY FLX DX W/COLLJ SPEC WHEN PFRMD N/A 04/25/2016    Procedure: COLONOSCOPY;  Surgeon: Thao Brown MD;  Location: [facility]; Service: Gastroenterology   • AL CYSTO W/REMOVAL OF LESIONS SMALL N/A 12/05/2019    Procedure: CYSTO W/TURBT AND TRANSURETHRAL PROSTATE BIOPSY AND OPENING OF BLADDER NECK CONTRACTURE, B/L Retrograde pyelogram;  Surgeon: Avinash Weston MD;  [facility];  Service: Urology   • AL CYSTOURETHROSCOPY W/DEST &/RMVL MED BLADDER TY N/A 04/16/2020    Procedure: CYSTOSCOPY, TRANSURETHRAL RESECTION OF BLADDER TUMOR (TURBT);   Surgeon: Avinash Weston MD;  [facility];  Service: Urology   • AL CYSTOURETHROSCOPY WITH BIOPSY N/A 09/10/2020    Procedure: CYSTOSCOPY, COLLECTION OF LEFT KIDNEY CYTOLOGY, BILATERAL RETROGRADE PYELOGRAM, BLADDER WALL BIOPSIES  AND FULGERAION, RANDOM BLADDER TUMOR BIOPSIES;  Surgeon: Avinash Weston MD;  Location: [address] [place] [address] MAIN OR;  Service: Urology   • AL CYSTOURETHROSCOPY WITH BIOPSY N/A 04/05/2022    Procedure: urethroscopy , biopsy of urethra with fulgeration;  Surgeon: Jamel Gutierrez MD;  Location:  MAIN OR;  Service: Urology   • PROSTATE SURGERY     • TONSILLECTOMY     • TRANSURETHRAL RESECTION OF PROSTATE N/A 12/20/2018    Procedure: CYSTO, PHOTO SELECTIVE VAPORIZATION OF PROSTATE, B/L RETROGRADE PYELOGRAM, TURBT;  Surgeon: Avinash Weston MD;  [facility];  Service: Urology   • UMBILICAL HERNIA REPAIR  2012   • UPPER GASTROINTESTINAL ENDOSCOPY         Current Outpatient Medications:   • atorvastatin (LIPITOR) 40 mg tablet, TAKE ONE TABLET BY MOUTH AT BEDTIME, Disp: 90 tablet, Rfl: 3  •  Blood Glucose Monitoring Suppl (OneTouch Verio Flex System) w/Device KIT, Use to check blood sugars daily, Disp: 1 kit, Rfl: 0  •  Cholecalciferol (VITAMIN D) 50 MCG (2000 UT) tablet, Take 2,000 Units by mouth daily, Disp: , Rfl:   •  citalopram (CeleXA) 20 mg tablet, TAKE ONE TABLET BY MOUTH EVERY DAY, Disp: 30 tablet, Rfl: 3  •  Farxiga 5 MG TABS, TAKE ONE TABLET BY MOUTH EVERY DAY, Disp: 90 tablet, Rfl: 2  •  ferrous sulfate 324 (65 Fe) mg, Take 1 tablet (324 mg total) by mouth daily, Disp: 30 tablet, Rfl: 5  •  Lancets (OneTouch Delica Plus [id number]) MISC, TEST BLOOD GLUCOSE ONCE DAILY, Disp: 100 each, Rfl: 0  •  losartan (COZAAR) 25 mg tablet, Take 25 mg by mouth daily, Disp: , Rfl:   •  magnesium oxide (MAG-OX) 400 mg, Take 1 tablet (400 mg total) by mouth in the morning , Disp: 180 tablet, Rfl: 2  •  metoprolol tartrate (LOPRESSOR) 25 mg tablet, Take 1 tablet (25 mg total) by mouth every 12 (twelve) hours, Disp: 180 tablet, Rfl: 3  •  Multiple Vitamins-Minerals (OCUVITE-LUTEIN PO), Take 1 tablet by mouth 2 (two) times a day Pt is taking preservision , Disp: , Rfl:   •  omeprazole (PriLOSEC) 40 MG capsule, Take 1 capsule (40 mg total) by mouth in the morning , Disp: 90 capsule, Rfl: 3  •  ondansetron (ZOFRAN) 4 mg tablet, Take 1 tablet (4 mg total) by mouth every 8 (eight) hours as needed for nausea or vomiting, Disp: 20 tablet, Rfl: 1  •  OneTouch Verio test strip, TEST ONCE A DAY, Disp: 100 strip, Rfl: 0  •  sodium bicarbonate 650 mg tablet, Take 2 tablets (1,300 mg total) by mouth 2 (two) times a day, Disp: 360 tablet, Rfl: 3  •  torsemide (DEMADEX) 20 mg tablet, Take 1 tablet (20 mg total) by mouth daily, Disp: 90 tablet, Rfl: 3  •  traMADol (ULTRAM) 50 mg tablet, TAKE ONE TABLET BY MOUTH EVERY 6 HOURS AS NEEDED FOR MODERATE PAIN, Disp: 20 tablet, Rfl: 0  •  vitamin E, tocopherol, 400 units capsule, Take 400 Units by mouth daily, Disp: , Rfl:   •  metolazone (ZAROXOLYN) 5 mg tablet, Take 1 tablet (5 mg total) by mouth 3 (three) times a week, Disp: 36 tablet, Rfl: 3  Allergies   Allergen Reactions   • Fentanyl Hallucinations   • Morphine And Related Other (See Comments)     While having an MI patient received morphine and had adverse reaction but doesn't know what happened  Vitals:    03/09/23 0938   BP: 138/52   BP Location: Left arm   Patient Position: Sitting   Cuff Size: Standard   Pulse: (!) 52   SpO2: 99%   Weight: 83 kg (183 lb)     Weight (last 2 days)     Date/Time Weight    03/09/23 0938 83 (183)         Blood pressure 138/52, pulse (!) 52, weight 83 kg (183 lb), SpO2 99 %  , Body mass index is 28 22 kg/m²      Labs:  Appointment on 03/07/2023   Component Date Value   • WBC 03/07/2023 9 07    • RBC 03/07/2023 3 67 (L)    • Hemoglobin 03/07/2023 11 6 (L)    • Hematocrit 03/07/2023 35 1 (L)    • MCV 03/07/2023 96    • MCH 03/07/2023 31 6    • MCHC 03/07/2023 33 0    • RDW 03/07/2023 18 1 (H)    • MPV 03/07/2023 11 8    • Platelets 59/83/6097 101 (L)    • nRBC 03/07/2023 0    • Neutrophils Relative 03/07/2023 81 (H)    • Immat GRANS % 03/07/2023 1    • Lymphocytes Relative 03/07/2023 7 (L)    • Monocytes Relative 03/07/2023 10    • Eosinophils Relative 03/07/2023 1    • Basophils Relative 03/07/2023 0    • Neutrophils Absolute 03/07/2023 7 41    • Immature Grans Absolute 03/07/2023 0 08    • Lymphocytes Absolute 03/07/2023 0 65    • Monocytes Absolute 03/07/2023 0 86    • Eosinophils Absolute 03/07/2023 0 05    • Basophils Absolute 03/07/2023 0 02    • Sodium 03/07/2023 132 (L)    • Potassium 03/07/2023 4 1    • Chloride 03/07/2023 104    • CO2 03/07/2023 25    • ANION GAP 03/07/2023 3 (L)    • BUN 03/07/2023 63 (H)    • Creatinine 03/07/2023 2 60 (H)    • Glucose, Fasting 03/07/2023 141 (H)    • Calcium 03/07/2023 9 5    • AST 03/07/2023 26    • ALT 03/07/2023 42    • Alkaline Phosphatase 03/07/2023 97    • Total Protein 03/07/2023 7 4    • Albumin 03/07/2023 4 0    • Total Bilirubin 03/07/2023 0 49    • eGFR 03/07/2023 21    Appointment on 02/21/2023   Component Date Value   • WBC 02/21/2023 7 93    • RBC 02/21/2023 3 58 (L)    • Hemoglobin 02/21/2023 11 4 (L)    • Hematocrit 02/21/2023 35 4 (L)    • MCV 02/21/2023 99 (H)    • MCH 02/21/2023 31 8    • MCHC 02/21/2023 32 2    • RDW 02/21/2023 17 2 (H)    • MPV 02/21/2023 12 2    • Platelets 45/18/1073 234    • nRBC 02/21/2023 0    • Neutrophils Relative 02/21/2023 75    • Immat GRANS % 02/21/2023 1    • Lymphocytes Relative 02/21/2023 7 (L)    • Monocytes Relative 02/21/2023 15 (H)    • Eosinophils Relative 02/21/2023 1    • Basophils Relative 02/21/2023 1    • Neutrophils Absolute 02/21/2023 6 03    • Immature Grans Absolute 02/21/2023 0 07    • Lymphocytes Absolute 02/21/2023 0 55 (L)    • Monocytes Absolute 02/21/2023 1 19    • Eosinophils Absolute 02/21/2023 0 05    • Basophils Absolute 02/21/2023 0 04    • Sodium 02/21/2023 136    • Potassium 02/21/2023 4 4    • Chloride 02/21/2023 104    • CO2 02/21/2023 25    • ANION GAP 02/21/2023 7    • BUN 02/21/2023 59 (H)    • Creatinine 02/21/2023 3 32 (H)    • Glucose, Fasting 02/21/2023 259 (H)    • Calcium 02/21/2023 9 4    • AST 02/21/2023 19    • ALT 02/21/2023 30    • Alkaline Phosphatase 02/21/2023 90    • Total Protein 02/21/2023 6 9    • Albumin 02/21/2023 3 6    • Total Bilirubin 02/21/2023 0 54    • eGFR 02/21/2023 16    Lab on 02/14/2023   Component Date Value   • WBC 02/14/2023 6 51    • RBC 02/14/2023 3 65 (L)    • Hemoglobin 02/14/2023 11 7 (L)    • Hematocrit 02/14/2023 35 8 (L)    • MCV 02/14/2023 98    • MCH 02/14/2023 32 1    • MCHC 02/14/2023 32 7    • RDW 02/14/2023 16 7 (H)    • MPV 02/14/2023 11 4    • Platelets 09/67/6377     • nRBC 02/14/2023 0    • Neutrophils Relative 02/14/2023 72    • Immat GRANS % 02/14/2023 1    • Lymphocytes Relative 02/14/2023 12 (L)    • Monocytes Relative 02/14/2023 13 (H)    • Eosinophils Relative 02/14/2023 1    • Basophils Relative 02/14/2023 1    • Neutrophils Absolute 02/14/2023 4 78    • Immature Grans Absolute 02/14/2023 0 03    • Lymphocytes Absolute 02/14/2023 0 75    • Monocytes Absolute 02/14/2023 0 86    • Eosinophils Absolute 02/14/2023 0 06    • Basophils Absolute 02/14/2023 0 03    • Sodium 02/14/2023 139    • Potassium 02/14/2023 4 4    • Chloride 02/14/2023 106    • CO2 02/14/2023 26    • ANION GAP 02/14/2023 7    • BUN 02/14/2023 53 (H)    • Creatinine 02/14/2023 2 67 (H)    • Glucose, Fasting 02/14/2023 166 (H)    • Calcium 02/14/2023 9 7    • AST 02/14/2023 25    • ALT 02/14/2023 39    • Alkaline Phosphatase 02/14/2023 89    • Total Protein 02/14/2023 7 1    • Albumin 02/14/2023 3 7    • Total Bilirubin 02/14/2023 0 47    • eGFR 02/14/2023 21    • Hemoglobin A1C 02/14/2023 6 6 (H)    • EAG 02/14/2023 143    • Iron Saturation 02/14/2023 25    • TIBC 02/14/2023 285    • Iron 02/14/2023 70    • Ferritin 02/14/2023 242    • Segmented Neutrophils Ma* 02/14/2023 81 (H)    • Lymphocytes Manual 02/14/2023 5 (L)    • Monocytes Manual 02/14/2023 9    • Eosinophils Manual 02/14/2023 3    • Basos 02/14/2023 1    • Atypical Lymphocytes Rel* 02/14/2023 1 (H)    • Total Counted 02/14/2023 100    • Ovalocytes 02/14/2023 Present    • Poikilocytes 02/14/2023 Present    • Polychromasia 02/14/2023 Present    • Anisocytosis 02/14/2023 Present    • Platelet Estimate 32/00/4309 Unable to Estimate due to clumped platelets (A)    • Pathology Review 02/14/2023 Yes (A)    • Path Review 02/14/2023                      Value:WBC: Mild increase in reactive lymphocytes and no increase in circulating blasts  RBC: Borderline normocytic anemia with no increase in schistocytes  PLT: Increased platelet clumps, however there are increased large platelets and an overall paucity of platelets likely reflecting a partial thrombocytopenia      Note: The patient's history of recent chemotherapy is noted  These findings of borderline cytopenias and reactive lymphocytes are favored to represent secondary changes in the setting of chemotherapy  Although accurate quantitation of platelets is not possible, a mild thrombocytopenia in the setting of treatment is favored  Clinical correlation is advised     Hospital Outpatient Visit on 02/01/2023   Component Date Value   • Citrated Platelets 24/82/3332 69 (L)    Lab on 01/30/2023   Component Date Value   • WBC 01/30/2023 6 20    • RBC 01/30/2023 3 67 (L)    • Hemoglobin 01/30/2023 11 4 (L)    • Hematocrit 01/30/2023 35 6 (L)    • MCV 01/30/2023 97    • MCH 01/30/2023 31 1    • MCHC 01/30/2023 32 0    • RDW 01/30/2023 14 6    • MPV 01/30/2023 12 8 (H)    • Platelets 23/85/6719     • nRBC 01/30/2023 0    • Neutrophils Relative 01/30/2023 77 (H)    • Immat GRANS % 01/30/2023 1    • Lymphocytes Relative 01/30/2023 13 (L)    • Monocytes Relative 01/30/2023 8    • Eosinophils Relative 01/30/2023 1    • Basophils Relative 01/30/2023 0    • Neutrophils Absolute 01/30/2023 4 83    • Immature Grans Absolute 01/30/2023 0 03    • Lymphocytes Absolute 01/30/2023 0 81    • Monocytes Absolute 01/30/2023 0 47    • Eosinophils Absolute 01/30/2023 0 04    • Basophils Absolute 01/30/2023 0 02    • Sodium 01/30/2023 136    • Potassium 01/30/2023 4 8    • Chloride 01/30/2023 105    • CO2 01/30/2023 25    • ANION GAP 01/30/2023 6    • BUN 01/30/2023 66 (H)    • Creatinine 01/30/2023 2 95 (H)    • Glucose, Fasting 01/30/2023 151 (H)    • Calcium 01/30/2023 9 4    • AST 01/30/2023 22    • ALT 01/30/2023 33    • Alkaline Phosphatase 01/30/2023 107    • Total Protein 01/30/2023 7 2    • Albumin 01/30/2023 3 6    • Total Bilirubin 01/30/2023 0 37    • eGFR 01/30/2023 18    Appointment on 01/16/2023   Component Date Value   • WBC 01/16/2023 10 20 (H)    • RBC 01/16/2023 3 92    • Hemoglobin 01/16/2023 12 3    • Hematocrit 01/16/2023 39 5    • MCV 01/16/2023 101 (H)    • MCH 01/16/2023 31 4    • MCHC 01/16/2023 31 1 (L)    • RDW 01/16/2023 15 3 (H)    • MPV 01/16/2023 12 5    • Platelets 28/28/9511 200    • nRBC 01/16/2023 0    • Neutrophils Relative 01/16/2023 77 (H)    • Immat GRANS % 01/16/2023 0    • Lymphocytes Relative 01/16/2023 11 (L)    • Monocytes Relative 01/16/2023 10    • Eosinophils Relative 01/16/2023 2    • Basophils Relative 01/16/2023 0    • Neutrophils Absolute 01/16/2023 7 86 (H)    • Immature Grans Absolute 01/16/2023 0 03    • Lymphocytes Absolute 01/16/2023 1 07    • Monocytes Absolute 01/16/2023 0 99    • Eosinophils Absolute 01/16/2023 0 21    • Basophils Absolute 01/16/2023 0 04    • Sodium 01/16/2023 137    • Potassium 01/16/2023 4 4    • Chloride 01/16/2023 104    • CO2 01/16/2023 28    • ANION GAP 01/16/2023 5    • BUN 01/16/2023 64 (H)    • Creatinine 01/16/2023 2 55 (H)    • Glucose, Fasting 01/16/2023 151 (H)    • Calcium 01/16/2023 9 6    • AST 01/16/2023 15    • ALT 01/16/2023 17    • Alkaline Phosphatase 01/16/2023 88    • Total Protein 01/16/2023 7 8    • Albumin 01/16/2023 3 7    • Total Bilirubin 01/16/2023 0 63    • eGFR 01/16/2023 22    Appointment on 01/13/2023   Component Date Value   • WBC 01/13/2023 10 49 (H)    • RBC 01/13/2023 3 79 (L)    • Hemoglobin 01/13/2023 12 0    • Hematocrit 01/13/2023 38 3    • MCV 01/13/2023 101 (H)    • MCH 01/13/2023 31 7    • MCHC 01/13/2023 31 3 (L)    • RDW 01/13/2023 15 7 (H)    • MPV 01/13/2023 12 2    • Platelets 86/76/7604 221    • nRBC 01/13/2023 0    • Neutrophils Relative 01/13/2023 73    • Immat GRANS % 01/13/2023 1    • Lymphocytes Relative 01/13/2023 13 (L)    • Monocytes Relative 01/13/2023 10    • Eosinophils Relative 01/13/2023 2    • Basophils Relative 01/13/2023 1    • Neutrophils Absolute 01/13/2023 7 70 (H)    • Immature Grans Absolute 01/13/2023 0 05    • Lymphocytes Absolute 01/13/2023 1 34    • Monocytes Absolute 01/13/2023 1 07    • Eosinophils Absolute 01/13/2023 0 25    • Basophils Absolute 01/13/2023 0 08 Office Visit on 01/10/2023   Component Date Value   • Sodium 01/13/2023 135    • Potassium 01/13/2023 3 8    • Chloride 01/13/2023 98    • CO2 01/13/2023 28    • ANION GAP 01/13/2023 9    • BUN 01/13/2023 68 (H)    • Creatinine 01/13/2023 3 21 (H)    • Glucose, Fasting 01/13/2023 166 (H)    • Calcium 01/13/2023 9 8    • eGFR 01/13/2023 17    Lab on 01/10/2023   Component Date Value   • Phosphorus 01/10/2023 3 7    • Magnesium 01/10/2023 2 6    • WBC 01/10/2023 10 81 (H)    • RBC 01/10/2023 3 93    • Hemoglobin 01/10/2023 12 6    • Hematocrit 01/10/2023 40 3    • MCV 01/10/2023 103 (H)    • MCH 01/10/2023 32 1    • MCHC 01/10/2023 31 3 (L)    • RDW 01/10/2023 16 1 (H)    • Platelets 48/71/6873 242    • MPV 01/10/2023 12 1    • Creatinine, Ur 01/10/2023 72 2    • Protein Urine Random 01/10/2023 39    • Prot/Creat Ratio, Ur 01/10/2023 0 54 (H)    • PTH 01/10/2023 234 1 (H)    • Color, UA 01/10/2023 Yellow    • Clarity, UA 01/10/2023 Extra Turbid    • Specific Gravity, UA 01/10/2023 1 012    • pH, UA 01/10/2023 7 0    • Leukocytes, UA 01/10/2023 Large (A)    • Nitrite, UA 01/10/2023 Negative    • Protein, UA 01/10/2023 30 (1+) (A)    • Glucose, UA 01/10/2023 Negative    • Ketones, UA 01/10/2023 Negative    • Urobilinogen, UA 01/10/2023 3 0 (A)    • Bilirubin, UA 01/10/2023 Negative    • Occult Blood, UA 01/10/2023 Negative    • RBC, UA 01/10/2023 None Seen    • WBC, UA 01/10/2023 4-10 (A)    • Epithelial Cells 01/10/2023 None Seen    • Bacteria, UA 01/10/2023 Occasional    • Amorphous Crystals, UA 01/10/2023 Moderate    • Vit D, 25-Hydroxy 01/10/2023 37 2    • Sodium 01/10/2023 139    • Potassium 01/10/2023 4 7    • Chloride 01/10/2023 102    • CO2 01/10/2023 28    • ANION GAP 01/10/2023 9    • BUN 01/10/2023 74 (H)    • Creatinine 01/10/2023 3 29 (H)    • Glucose, Fasting 01/10/2023 170 (H)    • Calcium 01/10/2023 10 2 (H)    • AST 01/10/2023 15    • ALT 01/10/2023 19    • Alkaline Phosphatase 01/10/2023 93    • Total Protein 01/10/2023 8 0    • Albumin 01/10/2023 3 9    • Total Bilirubin 01/10/2023 0 58    • eGFR 01/10/2023 16    Lab on 12/27/2022   Component Date Value   • Sodium 12/27/2022 138    • Potassium 12/27/2022 3 8    • Chloride 12/27/2022 105    • CO2 12/27/2022 28    • ANION GAP 12/27/2022 5    • BUN 12/27/2022 48 (H)    • Creatinine 12/27/2022 2 61 (H)    • Glucose, Fasting 12/27/2022 142 (H)    • Calcium 12/27/2022 9 0    • Corrected Calcium 12/27/2022 9 6    • AST 12/27/2022 20    • ALT 12/27/2022 22    • Alkaline Phosphatase 12/27/2022 90    • Total Protein 12/27/2022 7 1    • Albumin 12/27/2022 3 2 (L)    • Total Bilirubin 12/27/2022 0 64    • eGFR 12/27/2022 21    • WBC 12/27/2022 7 82    • RBC 12/27/2022 3 15 (L)    • Hemoglobin 12/27/2022 10 4 (L)    • Hematocrit 12/27/2022 33 9 (L)    • MCV 12/27/2022 108 (H)    • MCH 12/27/2022 33 0    • MCHC 12/27/2022 30 7 (L)    • RDW 12/27/2022 20 0 (H)    • MPV 12/27/2022 11 4    • Platelets 60/15/7975 194    • nRBC 12/27/2022 0    • Neutrophils Relative 12/27/2022 64    • Immat GRANS % 12/27/2022 1    • Lymphocytes Relative 12/27/2022 16    • Monocytes Relative 12/27/2022 16 (H)    • Eosinophils Relative 12/27/2022 2    • Basophils Relative 12/27/2022 1    • Neutrophils Absolute 12/27/2022 5 05    • Immature Grans Absolute 12/27/2022 0 04    • Lymphocytes Absolute 12/27/2022 1 26    • Monocytes Absolute 12/27/2022 1 25 (H)    • Eosinophils Absolute 12/27/2022 0 17    • Basophils Absolute 12/27/2022 0 05    There may be more visits with results that are not included  Imaging: No results found  Review of Systems:  Review of Systems   Constitutional: Negative for diaphoresis, fatigue, fever and unexpected weight change  HENT: Negative  Respiratory: Negative for cough, shortness of breath and wheezing  Cardiovascular: Negative for chest pain, palpitations and leg swelling  Gastrointestinal: Negative for abdominal pain, diarrhea and nausea  Musculoskeletal: Negative for gait problem and myalgias  Skin: Negative for rash  Neurological: Negative for dizziness and numbness  Psychiatric/Behavioral: Negative  Physical Exam:  Physical Exam  Constitutional:       Appearance: He is well-developed  HENT:      Head: Normocephalic and atraumatic  Eyes:      Pupils: Pupils are equal, round, and reactive to light  Neck:      Vascular: No JVD  Cardiovascular:      Rate and Rhythm: Regular rhythm  Pulses: Normal pulses  Carotid pulses are 2+ on the right side and 2+ on the left side  Heart sounds: S1 normal and S2 normal    Pulmonary:      Effort: Pulmonary effort is normal       Breath sounds: Normal breath sounds  No wheezing or rales  Abdominal:      General: Bowel sounds are normal       Palpations: Abdomen is soft  Tenderness: There is no abdominal tenderness  Musculoskeletal:         General: No tenderness  Normal range of motion  Cervical back: Normal range of motion and neck supple  Skin:     General: Skin is warm  Neurological:      Mental Status: He is alert and oriented to person, place, and time  Cranial Nerves: No cranial nerve deficit  Deep Tendon Reflexes: Reflexes are normal and symmetric

## 2023-03-10 ENCOUNTER — TELEPHONE (OUTPATIENT)
Dept: HEMATOLOGY ONCOLOGY | Facility: CLINIC | Age: 83
End: 2023-03-10

## 2023-03-10 NOTE — TELEPHONE ENCOUNTER
Patient is calling in to confirm whether there are labs in his chart, I have confirmed that they are and patient voiced understanding

## 2023-03-20 ENCOUNTER — HOSPITAL ENCOUNTER (OUTPATIENT)
Dept: RADIOLOGY | Facility: HOSPITAL | Age: 83
Discharge: HOME | End: 2023-03-20

## 2023-03-20 DIAGNOSIS — C67.8 MALIGNANT NEOPLASM OF OVERLAPPING SITES OF BLADDER (HCC): ICD-10-CM

## 2023-03-20 DIAGNOSIS — D49.4 BLADDER TUMOR: ICD-10-CM

## 2023-03-28 ENCOUNTER — LAB (OUTPATIENT)
Dept: LAB | Facility: CLINIC | Age: 83
End: 2023-03-28

## 2023-03-28 ENCOUNTER — TELEPHONE (OUTPATIENT)
Dept: NEPHROLOGY | Facility: CLINIC | Age: 83
End: 2023-03-28

## 2023-03-28 DIAGNOSIS — C67.5 MALIGNANT NEOPLASM OF URINARY BLADDER NECK (HCC): ICD-10-CM

## 2023-03-28 DIAGNOSIS — R82.89 POSITIVE URINARY CYTOLOGY: ICD-10-CM

## 2023-03-28 DIAGNOSIS — D63.1 ANEMIA DUE TO STAGE 4 CHRONIC KIDNEY DISEASE (HCC): ICD-10-CM

## 2023-03-28 DIAGNOSIS — C67.8 MALIGNANT NEOPLASM OF OVERLAPPING SITES OF BLADDER (HCC): ICD-10-CM

## 2023-03-28 DIAGNOSIS — N18.4 ANEMIA DUE TO STAGE 4 CHRONIC KIDNEY DISEASE (HCC): ICD-10-CM

## 2023-03-28 DIAGNOSIS — N18.4 CKD (CHRONIC KIDNEY DISEASE) STAGE 4, GFR 15-29 ML/MIN (HCC): ICD-10-CM

## 2023-03-28 DIAGNOSIS — R79.89 ELEVATED SERUM CREATININE: ICD-10-CM

## 2023-03-28 DIAGNOSIS — D49.4 BLADDER TUMOR: ICD-10-CM

## 2023-03-28 LAB
ANION GAP SERPL CALCULATED.3IONS-SCNC: 4 MMOL/L (ref 4–13)
BASOPHILS # BLD AUTO: 0.03 THOUSANDS/ÂΜL (ref 0–0.1)
BASOPHILS NFR BLD AUTO: 0 % (ref 0–1)
BUN SERPL-MCNC: 52 MG/DL (ref 5–25)
CALCIUM SERPL-MCNC: 9.2 MG/DL (ref 8.3–10.1)
CHLORIDE SERPL-SCNC: 107 MMOL/L (ref 96–108)
CO2 SERPL-SCNC: 24 MMOL/L (ref 21–32)
CREAT SERPL-MCNC: 2.86 MG/DL (ref 0.6–1.3)
EOSINOPHIL # BLD AUTO: 0.11 THOUSAND/ÂΜL (ref 0–0.61)
EOSINOPHIL NFR BLD AUTO: 1 % (ref 0–6)
ERYTHROCYTE [DISTWIDTH] IN BLOOD BY AUTOMATED COUNT: 20.8 % (ref 11.6–15.1)
GFR SERPL CREATININE-BSD FRML MDRD: 19 ML/MIN/1.73SQ M
GLUCOSE P FAST SERPL-MCNC: 176 MG/DL (ref 65–99)
HCT VFR BLD AUTO: 31.3 % (ref 36.5–49.3)
HGB BLD-MCNC: 10.1 G/DL (ref 12–17)
IMM GRANULOCYTES # BLD AUTO: 0.07 THOUSAND/UL (ref 0–0.2)
IMM GRANULOCYTES NFR BLD AUTO: 1 % (ref 0–2)
LYMPHOCYTES # BLD AUTO: 0.83 THOUSANDS/ÂΜL (ref 0.6–4.47)
LYMPHOCYTES NFR BLD AUTO: 10 % (ref 14–44)
MCH RBC QN AUTO: 32.4 PG (ref 26.8–34.3)
MCHC RBC AUTO-ENTMCNC: 32.3 G/DL (ref 31.4–37.4)
MCV RBC AUTO: 100 FL (ref 82–98)
MONOCYTES # BLD AUTO: 1.3 THOUSAND/ÂΜL (ref 0.17–1.22)
MONOCYTES NFR BLD AUTO: 16 % (ref 4–12)
NEUTROPHILS # BLD AUTO: 5.89 THOUSANDS/ÂΜL (ref 1.85–7.62)
NEUTS SEG NFR BLD AUTO: 72 % (ref 43–75)
NRBC BLD AUTO-RTO: 0 /100 WBCS
PLATELET # BLD AUTO: 266 THOUSANDS/UL (ref 149–390)
PMV BLD AUTO: 11.5 FL (ref 8.9–12.7)
POTASSIUM SERPL-SCNC: 4 MMOL/L (ref 3.5–5.3)
RBC # BLD AUTO: 3.12 MILLION/UL (ref 3.88–5.62)
SODIUM SERPL-SCNC: 135 MMOL/L (ref 135–147)
WBC # BLD AUTO: 8.23 THOUSAND/UL (ref 4.31–10.16)

## 2023-03-28 NOTE — TELEPHONE ENCOUNTER
----- Message from Brittnee Bowers MD sent at 3/28/2023 12:23 PM EDT -----  Please inform patient that renal function is stable at creatinine 2 86 mg/dL  Electrolytes are stable, hemoglobin is stable at 10 1 g/dL  Continue same treatment    Repeat labs before the office visit in May

## 2023-03-28 NOTE — RESULT ENCOUNTER NOTE
Please inform patient that renal function is stable at creatinine 2 86 mg/dL  Electrolytes are stable, hemoglobin is stable at 10 1 g/dL  Continue same treatment    Repeat labs before the office visit in May

## 2023-03-28 NOTE — TELEPHONE ENCOUNTER
I spoke to Haider Angulo and made him aware of Dr French  message: renal function is stable at creatinine 2 86 mg/dL  Electrolytes are stable, hemoglobin is stable at 10 1 g/dL  Continue same treatment    Repeat labs before the office visit in May

## 2023-03-29 ENCOUNTER — TELEPHONE (OUTPATIENT)
Dept: HEMATOLOGY ONCOLOGY | Facility: CLINIC | Age: 83
End: 2023-03-29

## 2023-03-29 NOTE — TELEPHONE ENCOUNTER
Received call from radiology with significant finding on CT scan  Pt has follow up appt tomorrow 3/30

## 2023-03-30 ENCOUNTER — OFFICE VISIT (OUTPATIENT)
Dept: HEMATOLOGY ONCOLOGY | Facility: CLINIC | Age: 83
End: 2023-03-30

## 2023-03-30 VITALS
DIASTOLIC BLOOD PRESSURE: 88 MMHG | TEMPERATURE: 98.1 F | OXYGEN SATURATION: 96 % | HEIGHT: 67 IN | HEART RATE: 72 BPM | SYSTOLIC BLOOD PRESSURE: 144 MMHG | WEIGHT: 191 LBS | BODY MASS INDEX: 29.98 KG/M2

## 2023-03-30 DIAGNOSIS — C67.5 MALIGNANT NEOPLASM OF URINARY BLADDER NECK (HCC): Primary | ICD-10-CM

## 2023-03-30 NOTE — PROGRESS NOTES
Hematology Outpatient Follow - Up Note  Julia Barahona 80 y o  male MRN: @ Encounter: [id number]        Date:  3/30/2023        Assessment/ Plan:    59-year-old  male with superficial bladder cancers status post many BCG ease with refractoriness to BCG ease status post mitomycin instillation, cystoscopy on 04/2020 showed carcinoma in situ no evidence of invasive component     He received Pembrolizumab 200 mg flat dose every 3 weeks on 05/26/2020 after 2 cycles he had grade 3 elevation of the liver enzymes, thickening of the skin of the forearms consistent with toxicity to Pembrolizumab     Now with recurrent disease in the right iliac, pelvic area with extension into the aortic bifurcation      Evaluated at [facility], we feel the patient is candidate for chemotherapy utilizing gemcitabine/carboplatin this is stage IV disease      Initiated on carboplatin/gemcitabine on 09/30/2022, CT scan on December 2022 showed no evidence of disease in the abdomen or pelvis     Therapy was complicated with anemia requiring Aranesp which has been beneficial           2/1/23 platelet count 16,885 on platelet clumping assessment  2/1/23  Cycle 5 D15 gemzar 800mg/m2  No carboplatin 2nd to thrombocytopenia  Plan to reduce carboplatin to AUC 2 with cycle 6     2/14/23-hemoglobin 11 7, MCV 98, white blood cell count 6 5, platelet count unable to be reported due to clumping     Cycle 6 2/15/23 -unable to assess platelet count even on platelet clumping assessment  Therefore he did not receive treatment today  Will R/S treatment        CKD    Cr 2 2-3 3 since 1/2021    CT scan on March 2023 after finishing 6 cycles showed mesenteric lymphadenopathy stable, right iliac lymphadenopathy stable, no new lesions this might be reactive versus residual disease to distinguish Will order PET scan if these are active we will start maintenance avelumab        Labs and imaging studies are reviewed by ordering provider once results are available  If there are findings that need immediate attention, you will be contacted when results available  Discussing results and the implication on your healthcare is best discussed in person at your follow-up visit         HPI:    80-year-old  male with history of hypertension, coronary artery disease status post open heart surgery, diabetes mellitus type 2, diabetic neuropathy, he had a history of recurrent superficial bladder cancer status post many BCG unfortunately refractory to BCG, he had 1 time trans urethral resection of the bladder tumor and mitomycin instillation     Repeat cystoscopy on 04/16/2020 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, no evidence of invasive bladder cancers     He does not smoke, he drinks bourbon and vodka every night     Denies any headache blurred vision nausea vomiting diarrhea constipation dysuria hematuria melena hematochezia heat or cold intolerance  Initiated on Pembrolizumab 200 mg flat dose every 3 weeks on 05/26/2020, after 2 doses there was grade 3 elevation in the liver enzyme, alkaline phosphatase requiring discontinuation of Pembrolizumab, and later on normalization of the liver enzymes     Evaluated by ID there was no evidence of TB of the liver     Recurrence of disease on 09/2022 showed multiple suspicious enlarged lymph nodes that are extended internal iliac lymph nodes up to the aortoiliac bifurcation, evaluated at [address], he was not a candidate for surgical resection     Initiated on gemcitabine/carboplatin on 9/22 weeks on 1 week off, he received 3 cycles complicated with anemia requiring holding chemotherapy in December 2022, CAT scan in December 2022 showed no evidence of lymphadenopathy in the abdomen or pelvis    He continued gemcitabine/carboplatin and Aranesp 200 mcg every other week for anemia induced by chemotherapy finished a total of 6 cycles    CAT scan in March 2023 showed stable mesenteric lymphadenopathy, right iliac lymphadenopathy might be reactive or consistent with metastatic disease    To distinguish we will order PET scan if he has residual disease we will start maintenance avelumab  Interval History:    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 initiated on Aranesp, with exacerbation of congestive heart failure by anemia, chemotherapy to be done every other week for 3 more cycles (6 doses)     Molecular tests:     TERT, ARID1A mutation  Interval History:        Previous Treatment:         Test Results:    Imaging: CT chest abdomen pelvis wo contrast    Result Date: 3/29/2023  Narrative: CT CHEST, ABDOMEN AND PELVIS WITHOUT IV CONTRAST INDICATION:   C67 8: Malignant neoplasm of overlapping sites of bladder D49 4: Neoplasm of unspecified behavior of bladder  History of now metastatic recurrent superficial bladder cancer  Status post chemotherapy and immunotherapy  Assess response to treatment  COMPARISON:  CT, dated 11/30/2022  CT, dated 10/8/2021  CT, dated 11/25/2020  TECHNIQUE: CT examination of the chest, abdomen and pelvis was performed without intravenous contrast  Multiplanar 2D reformatted images were created from the source data  Radiation dose length product (DLP) for this visit:  1189 64 mGy-cm   This examination, like all CT scans performed in the [facility], was performed utilizing techniques to minimize radiation dose exposure, including the use of iterative reconstruction and automated exposure control  Enteric contrast was administered  FINDINGS: CHEST LUNGS:  No new pulmonary nodules  Stable tiny areas of pulmonary nodularity, linear scarring, and calcified granulomas since 2020, benign (all of which are annotated on series 6)  No lung consolidations  Mild bibasilar atelectasis  Airways are normal  PLEURA:  Trace bilateral pleural effusions, decreased since November   HEART/GREAT VESSELS: Cardiac enlargement  No pericardial effusion  CABG  Coronary arterial atherosclerotic calcifications  No thoracic aortic aneurysm  MEDIASTINUM AND BISHOP:  Unremarkable  CHEST WALL AND LOWER NECK:  Unremarkable  ABDOMEN LIVER/BILIARY TREE:  Unremarkable  GALLBLADDER:  There are gallstone(s) within the gallbladder, without pericholecystic inflammatory changes  SPLEEN:  Unremarkable  PANCREAS:  Unremarkable  ADRENAL GLANDS:  Unremarkable  KIDNEYS/URETERS:  Unremarkable  No hydronephrosis  STOMACH AND BOWEL:  Colon is normal   Postsurgical changes related to ileal conduit  Anastomoses are normal   Small bowel is normal   Stomach is normal  APPENDIX:  No findings to suggest appendicitis  ABDOMINOPELVIC CAVITY:  Redemonstrated is infiltration of mesenteric fat with associated enlargement of mesenteric lymph nodes  For example, a mesenteric lymph node measures 14 mm in short axis (series 2, image 174), previously 13 mm   note that these lymph nodes were not enlarged on the study from October 2021  Nodularity along the right pelvic sidewall has increased in caliber, now measuring 10 mm (series 2, image 203 and series 602, image 133), previously 7 mm, nonspecific  VESSELS:  Unremarkable for patient's age  PELVIS REPRODUCTIVE ORGANS:  Prostatectomy  URINARY BLADDER:  Cystectomy  ABDOMINAL WALL/INGUINAL REGIONS:  Right lower quadrant ileostomy  OSSEOUS STRUCTURES:  No acute fracture or destructive osseous lesion  Spinal degenerative changes are noted  Impression: Chest: 1  No metastases  2   Now trace bilateral pleural effusions have decreased since November  Abdomen and pelvis: 1  Mild interval enlargement in nodularity along the right pelvic sidewall is nonspecific, but progression of josh disease is a leading concern  Consider a PET/CT if it would change clinical management  2   Persistent mildly increased size of mesenteric lymph nodes with associated mesenteric infiltration    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, although the remote possibility of mesenteric metastatic disease is not excluded  The study was marked in EPIC for significant notification  Workstation performed: [id number]       Labs:   Lab Results   Component Value Date    WBC 8 23 03/28/2023    HGB 10 1 (L) 03/28/2023    HCT 31 3 (L) 03/28/2023     (H) 03/28/2023     03/28/2023     Lab Results   Component Value Date     08/13/2015    K 4 0 03/28/2023     03/28/2023    CO2 24 03/28/2023    ANIONGAP 10 08/13/2015    BUN 52 (H) 03/28/2023    CREATININE 2 86 (H) 03/28/2023    GLUCOSE 128 08/13/2015    GLUF 176 (H) 03/28/2023    CALCIUM 9 2 03/28/2023    CORRECTEDCA 9 6 12/27/2022    AST 26 03/07/2023    ALT 42 03/07/2023    ALKPHOS 97 03/07/2023    PROT 6 8 08/13/2015    BILITOT 0 58 08/13/2015    EGFR 19 03/28/2023       Lab Results   Component Value Date    IRON 70 02/14/2023    TIBC 285 02/14/2023    FERRITIN 242 02/14/2023       Lab Results   Component Value Date    FBIAIJWU90 666 08/13/2015         ROS: Review of Systems - Oncology       Current Medications: Reviewed  Allergies: Reviewed  PMH/FH/SH:  Reviewed      Physical Exam:    Body surface area is 1 98 meters squared  Wt Readings from Last 3 Encounters:   03/30/23 86 6 kg (191 lb)   03/09/23 83 4 kg (183 lb 13 8 oz)   03/09/23 83 kg (183 lb)        Temp Readings from Last 3 Encounters:   03/30/23 98 1 °F (36 7 °C) (Temporal)   03/09/23 (!) 97 4 °F (36 3 °C) (Temporal)   02/23/23 98 2 °F (36 8 °C) (Temporal)        BP Readings from Last 3 Encounters:   03/30/23 144/88   03/09/23 169/59   03/09/23 138/52         Pulse Readings from Last 3 Encounters:   03/30/23 72   03/09/23 (!) 48   03/09/23 (!) 52        Physical Exam    ECOG:    Goals and Barriers:  Current Goal: Minimize effects of disease  Barriers: None  Patient's Capacity to Self Care:  Patient is able to self care      Code Status: [unfilled]

## 2023-03-31 ENCOUNTER — FOLLOW UP (OUTPATIENT)
Dept: URBAN - METROPOLITAN AREA CLINIC 6 | Facility: CLINIC | Age: 83
End: 2023-03-31

## 2023-03-31 DIAGNOSIS — H35.3124: ICD-10-CM

## 2023-03-31 DIAGNOSIS — H25.813: ICD-10-CM

## 2023-03-31 DIAGNOSIS — F32.A ANXIETY AND DEPRESSION: ICD-10-CM

## 2023-03-31 DIAGNOSIS — E11.3293: ICD-10-CM

## 2023-03-31 DIAGNOSIS — H35.3211: ICD-10-CM

## 2023-03-31 DIAGNOSIS — F41.9 ANXIETY AND DEPRESSION: ICD-10-CM

## 2023-03-31 PROCEDURE — 92014 COMPRE OPH EXAM EST PT 1/>: CPT

## 2023-03-31 PROCEDURE — 92250 FUNDUS PHOTOGRAPHY W/I&R: CPT | Mod: NC

## 2023-03-31 PROCEDURE — 92134 CPTRZ OPH DX IMG PST SGM RTA: CPT

## 2023-03-31 RX ORDER — CITALOPRAM 20 MG/1
TABLET ORAL
Qty: 30 TABLET | Refills: 3 | Status: SHIPPED | OUTPATIENT
Start: 2023-03-31

## 2023-03-31 ASSESSMENT — VISUAL ACUITY
OS_CC: 20/80-
OU_CC: J10

## 2023-03-31 ASSESSMENT — TONOMETRY
OD_IOP_MMHG: 15
OS_IOP_MMHG: 11

## 2023-04-11 PROBLEM — R07.81 RIB PAIN ON LEFT SIDE: Status: ACTIVE | Noted: 2023-04-11

## 2023-04-11 PROBLEM — R10.32 LEFT INGUINAL PAIN: Status: ACTIVE | Noted: 2023-04-11

## 2023-04-26 DIAGNOSIS — C67.8 MALIGNANT NEOPLASM OF OVERLAPPING SITES OF BLADDER (HCC): ICD-10-CM

## 2023-04-26 RX ORDER — TRAMADOL HYDROCHLORIDE 50 MG/1
TABLET ORAL
Qty: 20 TABLET | Refills: 0 | Status: SHIPPED | OUTPATIENT
Start: 2023-04-26

## 2023-04-27 ENCOUNTER — TELEPHONE (OUTPATIENT)
Dept: HEMATOLOGY ONCOLOGY | Facility: CLINIC | Age: 83
End: 2023-04-27

## 2023-04-27 DIAGNOSIS — I25.10 CORONARY ARTERY DISEASE INVOLVING NATIVE CORONARY ARTERY OF NATIVE HEART WITHOUT ANGINA PECTORIS: ICD-10-CM

## 2023-04-27 RX ORDER — ATORVASTATIN CALCIUM 40 MG/1
TABLET, FILM COATED ORAL
Qty: 90 TABLET | Refills: 3 | Status: SHIPPED | OUTPATIENT
Start: 2023-04-27

## 2023-04-27 NOTE — TELEPHONE ENCOUNTER
Received VM from spouse stating he received a letter in the mail about his CT scan ordered by Domenico Nettles in March  Reviewed chart CT scan was on 03/20/2023, pt had follow up on 03/30/2023 with Dr Johnny Merchant  Results were reviewed and PET CT was ordered  VM left for patient advising him of above information  My direct call back was provided

## 2023-05-01 ENCOUNTER — HOSPITAL ENCOUNTER (OUTPATIENT)
Dept: RADIOLOGY | Age: 83
Discharge: HOME | End: 2023-05-01

## 2023-05-01 DIAGNOSIS — C67.5 MALIGNANT NEOPLASM OF URINARY BLADDER NECK (HCC): ICD-10-CM

## 2023-05-01 LAB — GLUCOSE SERPL-MCNC: 150 MG/DL (ref 65–140)

## 2023-05-01 NOTE — LETTER
[address] [place] [address]  [address] [facility] [address]      May 5, 2023    MRN: [id number]     Phone: [phone]     Dear Mr Monika Smart recently had a(n) Nuclear Medicine performed on 5/1/2023 at  [address] [place] [address] that was requested by Jeancarlos Fay MD  The study was reviewed by a radiologist, which is a physician who specializes in medical imaging  The radiologist issued a report describing his or her findings  In that report there was a finding that the radiologist felt warranted further discussion with your health care provider and that discussion would be beneficial to you  The results were sent to Jeancarlos Fay MD on 05/02/2023  9:54 AM  We recommend that you contact Jeancarlos Fay MD at [phone] or set up an appointment to discuss the results of the imaging test  If you have already heard from Jeancarlos Fay MD regarding the results of your study, you can disregard this letter  This letter is not meant to alarm you, but intended to encourage you to follow-up on your results with the provider that sent you for the imaging study  In addition, we have enclosed answers to frequently asked questions by other patients who have also received a letter to review results with their health care provider (see page two)  Thank you for choosing [address] [place] [address] for your medical imaging needs  FREQUENTLY ASKED QUESTIONS    1  Why am I receiving this letter? [facility] requires us to notify patients who have findings on imaging exams that may require more testing or follow-up with a health professional within the next 3 months          2  How serious is the finding on the imaging test?  This letter is sent to all patients who may need follow-up or more testing within the next 3 months  Receiving this letter does not necessarily mean you have a life-threatening imaging finding or disease  Recommendations in the radiologists imaging report are general in nature and it is up to your healthcare provider to say whether those recommendations make sense for your situation  You are strongly encouraged to talk to your health care provider about the results and ask whether additional steps need to be taken  3  Where can I get a copy of the final report for my recent radiology exam?  To get a full copy of the report you can access your records online at [email or web address] or please contact [place] [address] Records Department at [phone] Monday through Friday between 8 am and 6 pm          4  What do I need to do now? Please contact your health care provider who requested the imaging study to discuss what further actions (if any) are needed  You may have already heard from (your ordering provider) in regard to this test in which case you can disregard this letter  NOTICE IN ACCORDANCE WITH THE [facility] PATIENT TEST RESULT INFORMATION ACT OF 2018    You are receiving this notice as a result of a determination by your diagnostic imaging service that further discussions of your test results are warranted and would be beneficial to you  The complete results of your test or tests have been or will be sent to the health care practitioner that ordered the test or tests  It is recommended that you contact your health care practitioner to discuss your results as soon as possible

## 2023-05-02 ENCOUNTER — TELEPHONE (OUTPATIENT)
Dept: HEMATOLOGY ONCOLOGY | Facility: CLINIC | Age: 83
End: 2023-05-02

## 2023-05-04 ENCOUNTER — OFFICE VISIT (OUTPATIENT)
Dept: NEPHROLOGY | Facility: CLINIC | Age: 83
End: 2023-05-04

## 2023-05-04 VITALS
BODY MASS INDEX: 28.41 KG/M2 | HEIGHT: 67 IN | WEIGHT: 181 LBS | DIASTOLIC BLOOD PRESSURE: 84 MMHG | SYSTOLIC BLOOD PRESSURE: 152 MMHG

## 2023-05-04 DIAGNOSIS — N18.4 STAGE 4 CHRONIC KIDNEY DISEASE (HCC): Primary | ICD-10-CM

## 2023-05-04 DIAGNOSIS — E83.42 HYPOMAGNESEMIA: ICD-10-CM

## 2023-05-04 DIAGNOSIS — I12.9 BENIGN HYPERTENSION WITH CHRONIC KIDNEY DISEASE, STAGE IV (HCC): ICD-10-CM

## 2023-05-04 DIAGNOSIS — N25.81 SECONDARY HYPERPARATHYROIDISM OF RENAL ORIGIN (HCC): ICD-10-CM

## 2023-05-04 DIAGNOSIS — N18.4 ANEMIA DUE TO STAGE 4 CHRONIC KIDNEY DISEASE (HCC): ICD-10-CM

## 2023-05-04 DIAGNOSIS — N18.4 BENIGN HYPERTENSION WITH CHRONIC KIDNEY DISEASE, STAGE IV (HCC): ICD-10-CM

## 2023-05-04 DIAGNOSIS — R80.1 PERSISTENT PROTEINURIA: ICD-10-CM

## 2023-05-04 DIAGNOSIS — D63.1 ANEMIA DUE TO STAGE 4 CHRONIC KIDNEY DISEASE (HCC): ICD-10-CM

## 2023-05-04 DIAGNOSIS — E87.20 METABOLIC ACIDOSIS: ICD-10-CM

## 2023-05-04 DIAGNOSIS — I50.32 CHRONIC DIASTOLIC CHF (CONGESTIVE HEART FAILURE) (HCC): ICD-10-CM

## 2023-05-04 RX ORDER — CALCITRIOL 0.25 UG/1
CAPSULE, LIQUID FILLED ORAL
Qty: 24 CAPSULE | Refills: 3 | Status: SHIPPED | OUTPATIENT
Start: 2023-05-04

## 2023-05-04 NOTE — PROGRESS NOTES
NEPHROLOGY OUTPATIENT PROGRESS NOTE   Cal Ambriz 80 y o  male MRN: [id number]  DATE: 5/4/2023  Reason for visit:   Chief Complaint   Patient presents with    Follow-up    Chronic Kidney Disease       ASSESSMENT and PLAN:  Chronic kidney disease stage 4  -Patient has CKD progression and creatinine recently has been around 2 5-2 8 since November 2022  -Last blood work showed renal function stable at creatinine 2 8 mg/dL, continue to monitor continue to avoid nephrotoxins  Continue current dose of torsemide to maintain volume status  Continue Farxiga 10 mg daily to slow CKD progression  Continue low potassium diet  -patient had loopogram in February 2021 which was found to be normal  -chronic kidney disease likely due to diabetes mellitus type 2 causing diabetic nephropathy, hypertensive nephrosclerosis, age-related nephron loss, episode of acute kidney injury/ATN  -last A1c was 6 6 which is at goal  -avoid nephrotoxins-IV contrast and NSAIDs  -avoid hypotension  -has been to [facility] in [place], 2022  Preferred modality incenter HD when needed  -Discussed about access placement-referred for AV fistula placement and upper extremity duplex  -Not decided if he wants to get kidney transplant evaluation at [facility] or [facility], will let us know and then can refer at that time  -Check BMP in 1 month, follow-up in 3 months with advanced practitioner  Will discuss about advance care planning next visit      Persistent proteinuria  -Proteinuria improving to UPC ratio 0 54, UA without any RBCs, less likely GN    - Prior upc ratio of 1 2 g in May 2022   -likely due to hypertensive nephrosclerosis and diabetic nephropathy  -previously was on irbesartan which was stopped during episode of acute kidney injury in February 2021  -avoiding ARB due to worsening renal function and hyperkalemia but may consider in future if renal function remains stable and if potassium level remains stable    Overall proteinuria currently improving with use of Farxiga     Metabolic acidosis  - bicarbonate level  stable at 24  - Continue current dose of oral sodium bicarbonate- two tab bid   -Use of sodium bicarbonate level has shown to decrease the rate of renal function decline in CKD population     Hypomagnesemia on oral magnesium oxide 400 mg daily   Last magnesium level was at normal range at 2 6 in January 2023     Primary Hypertension with chronic kidney disease stage 4:   -Current medication:  Metoprolol  25 mg bid , torsemide 20 mg daily and losartan 25 mg daily  -Current blood pressure: elevated but stable last month during PCP visit  Has not taken meds at home   -Plan:    ? Continue current treatment with metoprolol and losartan  ? Monitor BP at home and if high can increase dose of losartan  -Recommend 2 g sodium diet     -Recommend daily exercise and weight loss  -Discussed home monitoring of BP and maintaining a log of blood pressure   -Recommend goal BP less than 130/80       Secondary hyperparathyroidism of renal origin:  PTH level trending up to 234 1 in January 2023 from previous level of 108 7 in May 2022, vitamin-D 37 2    -Calcium level at normal range but in the setting of PTH trending up, starting on calcitriol 0 25 mcg twice a week and monitor PTH  Avoid calcium supplementations due to risk of hypercalcemia      Anemia , iron deficiency and due to chronic kidney disease  -due to stage 4 chronic kidney disease vs chemotherpay   -Hemoglobin acceptable at 10 1 g/dL, MCV on the higher side at 100  Last iron saturation from February was 25% which is improving  -Continue oral ferrous sulfate  -Status post treatment with Aranesp  -Continue to monitor hemoglobin, continue follow-up with hematology    Chronic diastolic CHF  -Echocardiogram from December 4125 showed RV systolic pressure 58 mmHg, EF 45%    Abnormal diastolic function  -Continue current dose of torsemide, clinically appears euvolemic     Bladder cancer   -status post BCG and status post Keytruda   Status post radical cysto prostatectomy with 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   -status post drainage of anterior wall abscess  -s/p urethral biopsy -High grade, non-invasive papillary urothelial carcinoma   -now with recurrent disease in right iliac pelvic area- treated with chemo with carboplatin/ gemcitabine since sept 2022  and finished 6 cycles  CT from March 2023 showed stable mesenteric lymphadenopathy and right iliac lymphadenopathy and underwent PET scan which showed metabolically active lesion in the left pelvic sidewall  Suspicious for recurrence of disease, follow-up hematology input    -continue follow-up with Urology - reviewed urology note        History of right pelvic abscess: status post IR guided drainage and drain placement-removed on 03/05/2021      Patient Instructions   Renal function is stable continue same treatment  Started on calcitriol for elevated PTH  Check blood work in a month  Referred for vascular surgery and upper extremity vein mapping  Follow up: 3 months with repeat Lab work within a week of the scheduled office visit  Will discuss the results of the previsit Labs during the office visit  Diagnoses and all orders for this visit:    Stage 4 chronic kidney disease ([address] )  -     VAS vessel mapping for hemodialysis upper; Future  -     Ambulatory referral to Vascular Surgery; Future  -     Basic metabolic panel; Future  -     Basic metabolic panel; Future  -     Protein / creatinine ratio, urine; Future  -     Phosphorus; Future  -     PTH, intact; Future  -     Microalbumin / creatinine urine ratio; Future  -     Magnesium; Future  -     CBC; Future    Persistent proteinuria  -     Protein / creatinine ratio, urine; Future    Metabolic acidosis  -     Basic metabolic panel; Future    Hypomagnesemia  -     Magnesium;  Future    Benign hypertension with chronic kidney disease, stage IV (HCC)  -     Basic metabolic panel; Future  -     Protein / creatinine ratio, urine; Future    Secondary hyperparathyroidism of renal origin ([address] )  -     calcitriol (ROCALTROL) 0 25 mcg capsule; Take 1 tablet 2 times a week (Monday, Friday)    Chronic diastolic CHF (congestive heart failure) (HCC)  -     Basic metabolic panel; Future    Anemia due to stage 4 chronic kidney disease (HCC)  -     CBC; Future    Other orders  -     Omega-3 Fatty Acids (Fish Oil) 1200 MG CPDR; Take by mouth in the morning            SUBJECTIVE / HPI:  Anthony Nunez is a 80 y o   male with past history of CAD status post CABG, bladder cancer status post ileal conduit in October 2020, diabetes mellitus type 2, hypertension presenting for follow-up of chronic kidney disease    - Patient was 1st seen by Nephrology during hospital admission and had acute kidney injury at that time   Was thought to be prerenal, admission creatinine was 3 5 and improved to 1 9 in February 2021  Marian Eastman last office visit with advanced practitioner creatinine was around 2 0     - renal function recently since February 2021 to July 2021 was reviewed, creatinine has been around 1 9-2 2     -In 2023 creatinine had gone up to 3 3 on 2 occasions with more diuresis but recently has been around 2 6-2 8 which is likely the baseline with use of low-dose diuretics    Last last seen by advanced practitioner    He was started back on torsemide 20 mg daily in January 2023    Last blood work from 3/2823 showed creatinine 2 86 mg/dL, EGFR 19  Hemoglobin 10 1 g/dL  Hemoglobin A1c 6 6  Last PTH from January was 234 1, UPC ratio was 0 54 UA without any RBC but 4-10 WBC per high-power field     No urinary symptoms     REVIEW OF SYSTEMS:    Review of Systems   Constitutional: Negative for chills and fever  HENT: Negative for ear pain and sore throat  Eyes: Negative for pain and visual disturbance  Respiratory: Negative for cough and shortness of breath      Cardiovascular: Negative for chest pain and palpitations  Gastrointestinal: Negative for abdominal pain and vomiting  Genitourinary: Negative for dysuria and hematuria  Musculoskeletal: Positive for back pain  Negative for arthralgias  Skin: Negative for color change and rash  Neurological: Negative for seizures and syncope  All other systems reviewed and are negative  More than 10 point review of systems were obtained and discussed in length with the patient  Complete review of systems were negative / unremarkable except mentioned above         PAST MEDICAL HISTORY:  Past Medical History:   Diagnosis Date    [facility] kidney [facility] ([facility] )     Anemia Jan 2022    Arthritis     Hands    Wright esophagus     BPH with obstruction/lower urinary tract symptoms     CAD (coronary artery disease)     Last assessed 09/16/15    Cancer ([facility] [facility] )     bladder    Cataract, acquired     Last assessed 10/10/17    Chronic kidney disease     Coronary artery disease     Diabetes mellitus ([facility] [facility] )     NIDDM    Diabetic neuropathy ([facility] [address] )     Feet    Enlarged prostate with lower urinary tract symptoms (LUTS)     Last assessed 10/10/17    Erectile dysfunction     GERD (gastroesophageal reflux disease)     Last assessed 10/10/17    Hemoptysis     Last assessed 03/14/16    Hypercholesterolemia     Last assessed 10/10/17    Hypertension     Last assessed 10/10/17    Kidney stone     Macular degeneration     Right eye is particularly affected-peripheral vision intact    Myocardial infarction ([facility] [address] ) 1998    OAB (overactive bladder)     Testicular hypofunction     Testicular hypogonadism     Last assessed 10/10/17    Tinnitus     Umbilical hernia     Last assessed 06/18/14    Urge incontinence of urine     Wears glasses        PAST SURGICAL HISTORY:  Past Surgical History:   Procedure Laterality Date    BLADDER SURGERY      COLONOSCOPY      CORONARY ARTERY BYPASS GRAFT  07/16/2014    ABG x 4 LIMA to LAD,SVG to diagnoal 2 SVG to OM-1, SVG to PDA, resection of partial plerual mass    CT GUIDED PERC DRAINAGE CATHETER PLACEMENT  02/19/2021    CYSTOSCOPY  2013    CYSTOSCOPY  02/08/2022    [place]    ESOPHAGOGASTRODUODENOSCOPY      FL RETROGRADE PYELOGRAM  12/20/2018    FL RETROGRADE PYELOGRAM  12/05/2019    INGUINAL HERNIA REPAIR Bilateral 2015    IR DRAINAGE TUBE CHECK AND/OR REMOVAL  03/05/2021    PHOTODYNAMIC THERAPY      For Barretts esophagus    OK COLONOSCOPY FLX DX W/COLLJ SPEC WHEN PFRMD N/A 04/25/2016    Procedure: COLONOSCOPY;  Surgeon: Magda Ruffin MD;  Location: [facility]; Service: Gastroenterology    OK CYSTO W/REMOVAL OF LESIONS SMALL N/A 12/05/2019    Procedure: CYSTO W/TURBT AND TRANSURETHRAL PROSTATE BIOPSY AND OPENING OF BLADDER NECK CONTRACTURE, B/L Retrograde pyelogram;  Surgeon: Zach Lezama MD;  [facility];  Service: Urology    OK CYSTOURETHROSCOPY W/DEST &/RMVL MED BLADDER TY N/A 04/16/2020    Procedure: CYSTOSCOPY, TRANSURETHRAL RESECTION OF BLADDER TUMOR (TURBT);   Surgeon: Zach Lezama MD;  [facility];  Service: Urology    OK CYSTOURETHROSCOPY WITH BIOPSY N/A 09/10/2020    Procedure: CYSTOSCOPY, COLLECTION OF LEFT KIDNEY CYTOLOGY, BILATERAL RETROGRADE PYELOGRAM, BLADDER WALL BIOPSIES  AND FULGERAION, RANDOM BLADDER TUMOR BIOPSIES;  Surgeon: Zach Lezama MD;  Location: [address] [place] [address] MAIN OR;  Service: Urology    OK CYSTOURETHROSCOPY WITH BIOPSY N/A 04/05/2022    Procedure: urethroscopy , biopsy of urethra with fulgeration;  Surgeon: Yuridia Tracey MD;  Location:  MAIN OR;  Service: Urology    PROSTATE SURGERY      TONSILLECTOMY      TRANSURETHRAL RESECTION OF PROSTATE N/A 12/20/2018    Procedure: CYSTO, PHOTO SELECTIVE VAPORIZATION OF PROSTATE, B/L RETROGRADE PYELOGRAM, TURBT;  Surgeon: Zach Lezama MD;  [facility];  Service: Urology    UMBILICAL HERNIA REPAIR  2012    UPPER GASTROINTESTINAL ENDOSCOPY         SOCIAL HISTORY:  Social History     Substance and Sexual Activity   Alcohol Use Not Currently    Alcohol/week: 2 0 standard drinks    Types: 1 Glasses of wine, 1 Cans of beer per week    Comment: Non since 7/15/2020     Social History     Substance and Sexual Activity   Drug Use Never     Social History     Tobacco Use   Smoking Status Former    Packs/day:     Years: 10     Pack years: 10     Types: Cigarettes    Quit date: 1972    Years since quittin 3   Smokeless Tobacco Never   Tobacco Comments    Quit at age 28       FAMILY HISTORY:  Family History   Problem Relation Age of Onset    Cancer Mother         Topical oral abrasian caused cancer    Other Mother         Digestive System Complications    Diabetes Father     Heart disease Father     Hypertension Father     Coronary artery disease Father     Hyperlipidemia Father        MEDICATIONS:    Current Outpatient Medications:     atorvastatin (LIPITOR) 40 mg tablet, TAKE ONE TABLET BY MOUTH AT BEDTIME, Disp: 90 tablet, Rfl: 3    Blood Glucose Monitoring Suppl (OneTouch Verio Flex System) w/Device KIT, Use to check blood sugars daily, Disp: 1 kit, Rfl: 0    calcitriol (ROCALTROL) 0 25 mcg capsule, Take 1 tablet 2 times a week (Monday, Friday), Disp: 24 capsule, Rfl: 3    Cholecalciferol (VITAMIN D) 50 MCG ( UT) tablet, Take 2,000 Units by mouth daily, Disp: , Rfl:     citalopram (CeleXA) 20 mg tablet, TAKE ONE TABLET BY MOUTH EVERY DAY, Disp: 30 tablet, Rfl: 3    Farxiga 5 MG TABS, TAKE ONE TABLET BY MOUTH EVERY DAY, Disp: 90 tablet, Rfl: 2    ferrous sulfate 324 (65 Fe) mg, Take 1 tablet (324 mg total) by mouth daily, Disp: 30 tablet, Rfl: 5    Lancets (OneTouch Delica Plus [id number]) MISC, TEST BLOOD GLUCOSE ONCE DAILY, Disp: 100 each, Rfl: 0    losartan (COZAAR) 25 mg tablet, Take 25 mg by mouth daily, Disp: , Rfl:     magnesium oxide (MAG-OX) 400 mg, Take 1 tablet (400 mg total) by mouth in the morning , Disp: 180 tablet, Rfl: 2    metoprolol tartrate (LOPRESSOR) 25 mg "tablet, Take 1 tablet (25 mg total) by mouth every 12 (twelve) hours, Disp: 180 tablet, Rfl: 3    Multiple Vitamins-Minerals (OCUVITE-LUTEIN PO), Take 1 tablet by mouth 2 (two) times a day Pt is taking preservision , Disp: , Rfl:     Omega-3 Fatty Acids (Fish Oil) 1200 MG CPDR, Take by mouth in the morning, Disp: , Rfl:     omeprazole (PriLOSEC) 40 MG capsule, Take 1 capsule (40 mg total) by mouth in the morning , Disp: 90 capsule, Rfl: 3    OneTouch Verio test strip, TEST ONCE A DAY, Disp: 100 strip, Rfl: 0    sodium bicarbonate 650 mg tablet, Take 2 tablets (1,300 mg total) by mouth 2 (two) times a day, Disp: 360 tablet, Rfl: 3    torsemide (DEMADEX) 20 mg tablet, Take 1 tablet (20 mg total) by mouth daily, Disp: 90 tablet, Rfl: 3    traMADol (ULTRAM) 50 mg tablet, TAKE ONE TABLET BY MOUTH EVERY 6 HOURS AS NEEDED FOR MODERATE PAIN, Disp: 20 tablet, Rfl: 0    vitamin E, tocopherol, 400 units capsule, Take 400 Units by mouth daily, Disp: , Rfl:     ondansetron (ZOFRAN) 4 mg tablet, Take 1 tablet (4 mg total) by mouth every 8 (eight) hours as needed for nausea or vomiting (Patient not taking: Reported on 5/4/2023), Disp: 20 tablet, Rfl: 1      PHYSICAL EXAM:  Vitals:    05/04/23 0958   BP: 152/84   BP Location: Left arm   Patient Position: Sitting   Cuff Size: Adult   Weight: 82 1 kg (181 lb)   Height: 5' 7\" (1 702 m)     Body mass index is 28 35 kg/m²  Physical Exam  Constitutional:       General: He is not in acute distress  Appearance: He is well-developed  He is not diaphoretic  HENT:      Head: Normocephalic and atraumatic  Mouth/Throat:      Mouth: Mucous membranes are moist    Eyes:      General: No scleral icterus  Conjunctiva/sclera: Conjunctivae normal       Pupils: Pupils are equal, round, and reactive to light  Neck:      Thyroid: No thyromegaly  Cardiovascular:      Rate and Rhythm: Normal rate and regular rhythm  Heart sounds: Normal heart sounds  No murmur heard      " No friction rub  Pulmonary:      Effort: Pulmonary effort is normal  No respiratory distress  Breath sounds: Normal breath sounds  No wheezing or rales  Abdominal:      General: Bowel sounds are normal  There is no distension  Palpations: Abdomen is soft  Tenderness: There is no abdominal tenderness  Musculoskeletal:         General: No deformity  Cervical back: Neck supple  Right lower leg: No edema  Left lower leg: No edema  Lymphadenopathy:      Cervical: No cervical adenopathy  Skin:     Coloration: Skin is not pale  Nails: There is no clubbing  Neurological:      Mental Status: He is alert and oriented to person, place, and time  He is not disoriented  Psychiatric:         Mood and Affect: Mood normal  Mood is not anxious  Affect is not inappropriate  Behavior: Behavior normal          Thought Content:  Thought content normal          Lab Results:   Results for orders placed or performed during the hospital encounter of 05/01/23   Fingerstick Glucose (POCT)   Result Value Ref Range    POC Glucose 150 (H) 65 - 140 mg/dl     Lab Results:         Invalid input(s): ALBUMIN    Laboratory Results:  Lab Results   Component Value Date    WBC 8 23 03/28/2023    HGB 10 1 (L) 03/28/2023    HCT 31 3 (L) 03/28/2023     (H) 03/28/2023     03/28/2023     Lab Results   Component Value Date    SODIUM 135 03/28/2023    K 4 0 03/28/2023     03/28/2023    CO2 24 03/28/2023    BUN 52 (H) 03/28/2023    CREATININE 2 86 (H) 03/28/2023    GLUC 162 (H) 11/08/2022    CALCIUM 9 2 03/28/2023     Lab Results   Component Value Date    CALCIUM 9 2 03/28/2023    PHOS 3 7 01/10/2023     No results found for: LABPROT  [unfilled]  Lab Results   Component Value Date     1 (H) 01/10/2023    CALCIUM 9 2 03/28/2023    PHOS 3 7 01/10/2023     [unfilled]

## 2023-05-04 NOTE — PATIENT INSTRUCTIONS
Renal function is stable continue same treatment  Started on calcitriol for elevated PTH  Check blood work in a month  Referred for vascular surgery and upper extremity vein mapping  Follow up: 3 months with repeat Lab work within a week of the scheduled office visit  Will discuss the results of the previsit Labs during the office visit

## 2023-05-07 ENCOUNTER — HOSPITAL ENCOUNTER (OUTPATIENT)
Dept: VASCULAR ULTRASOUND | Facility: HOSPITAL | Age: 83
Discharge: HOME | End: 2023-05-07
Attending: INTERNAL MEDICINE

## 2023-05-07 DIAGNOSIS — N18.4 STAGE 4 CHRONIC KIDNEY DISEASE (HCC): ICD-10-CM

## 2023-05-09 ENCOUNTER — OFFICE VISIT (OUTPATIENT)
Dept: UROLOGY | Facility: AMBULATORY SURGERY CENTER | Age: 83
End: 2023-05-09

## 2023-05-09 VITALS
SYSTOLIC BLOOD PRESSURE: 126 MMHG | BODY MASS INDEX: 28.51 KG/M2 | DIASTOLIC BLOOD PRESSURE: 82 MMHG | HEART RATE: 50 BPM | OXYGEN SATURATION: 97 % | WEIGHT: 182 LBS

## 2023-05-09 DIAGNOSIS — D49.4 BLADDER TUMOR: Primary | ICD-10-CM

## 2023-05-09 NOTE — LETTER
May 9, 2023     Tobi Rajan, DO  [address]  [address] [place] [address] [place] [address]    Patient: Steven Loera   YOB: 1940   Date of Visit: 5/9/2023       Dear Dr Knox Agent: Thank you for referring Cristhian Epp to me for evaluation  Below are my notes for this consultation  If you have questions, please do not hesitate to call me  I look forward to following your patient along with you  Sincerely,        Kenia Collins MD        CC: DAYRON Golden MD Mace Porch, MD Emelia Roch, MD  5/9/2023  4:00 PM  Sign when Signing Visit  5/9/2023    Steven Loera  1940  [id number]        Assessment  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, status post cystectomy with ileal conduit creation, urethral recurrence, positive PET scan, status post platinum-based chemotherapy      Discussion  I discussed and reviewed the results of his PET scan in the office today which shows a metabolically active 1 4 cm pelvic sidewall lesion suspicious for recurrent disease  He is scheduled to see Dr Demetria Pallas and colleagues later this week  Per his most recent note if the PET scan shows metabolically active lesions the neck step would be maintenance Avelumab  I will defer immunotherapy to Dr Demetria Pallas and his team   I recommend that he return in follow-up in the next 3 to 4 months  In the interim he denies any blood per urethra and is not interested in returning to the operating room for ureteroscopy with fulguration  History of Present Illness  80 y o  male with a complicated urologic history including 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  He ultimately underwent radical cystoprostatectomy with ileal conduit creation  Postoperatively he was found to have recurrent disease in the right iliac chain up to the aortic bifurcation and was initially treated with pembrolizumab    Ultimately he was treated with carboplatinum and gemcitabine  When I performed urethroscopy I found carpeting of tumor of his urethra  This was biopsied and was most consistent with high-grade but noninvasive urothelial carcinoma  This was performed just over 1 year ago in the spring 2022  At his last office visit in January I attempted ureteroscopy but the proximal ureter was stenotic likely from fulguration  Fortunately he denies any blood per meatus  Recent CT PET scan shows metabolically active 1 4 cm left pelvic sidewall lesion  AUA Symptom Score      Review of Systems  Review of Systems   Constitutional: Negative  HENT: Negative  Eyes: Negative  Respiratory: Negative  Cardiovascular: Negative  Gastrointestinal: Negative  Endocrine: Negative  Genitourinary:        Per HPI   Musculoskeletal: Negative  Skin: Negative  Allergic/Immunologic: Negative  Neurological: Negative  Hematological: Negative  Psychiatric/Behavioral: Negative            Past Medical History  Past Medical History:   Diagnosis Date   • Acute kidney injury ([address] )    • Anemia Jan 2022   • Arthritis     Hands   • Wright esophagus    • BPH with obstruction/lower urinary tract symptoms    • CAD (coronary artery disease)     Last assessed 09/16/15   • Cancer ([facility] )     bladder   • Cataract, acquired     Last assessed 10/10/17   • Chronic kidney disease    • Coronary artery disease    • Diabetes mellitus ([address] )     NIDDM   • Diabetic neuropathy ([address] )     Feet   • Enlarged prostate with lower urinary tract symptoms (LUTS)     Last assessed 10/10/17   • Erectile dysfunction    • GERD (gastroesophageal reflux disease)     Last assessed 10/10/17   • Hemoptysis     Last assessed 03/14/16   • Hypercholesterolemia     Last assessed 10/10/17   • Hypertension     Last assessed 10/10/17   • Kidney stone    • Macular degeneration     Right eye is particularly affected-peripheral vision intact   • Myocardial infarction ([facility] [address] ) 1998   • OAB (overactive bladder) • Testicular hypofunction    • Testicular hypogonadism     Last assessed 10/10/17   • Tinnitus    • Umbilical hernia     Last assessed 06/18/14   • Urge incontinence of urine    • Wears glasses        Past Social History  Past Surgical History:   Procedure Laterality Date   • BLADDER SURGERY     • COLONOSCOPY     • CORONARY ARTERY BYPASS GRAFT  07/16/2014    ABG x 4 LIMA to LAD,SVG to diagnoal 2 SVG to OM-1, SVG to PDA, resection of partial plerual mass   • CT GUIDED [facility] DRAINAGE CATHETER PLACEMENT  02/19/2021   • CYSTOSCOPY  2013   • CYSTOSCOPY  02/08/2022    [place]   • ESOPHAGOGASTRODUODENOSCOPY     • FL RETROGRADE PYELOGRAM  12/20/2018   • FL RETROGRADE PYELOGRAM  12/05/2019   • INGUINAL HERNIA REPAIR Bilateral 2015   • IR DRAINAGE TUBE CHECK AND/OR REMOVAL  03/05/2021   • PHOTODYNAMIC THERAPY      For Barretts esophagus   • MO COLONOSCOPY FLX DX W/COLLJ SPEC WHEN PFRMD N/A 04/25/2016    Procedure: COLONOSCOPY;  Surgeon: Tanja Joel MD;  [facility]; Service: Gastroenterology   • MO CYSTO W/REMOVAL OF LESIONS SMALL N/A 12/05/2019    Procedure: CYSTO W/TURBT AND TRANSURETHRAL PROSTATE BIOPSY AND OPENING OF BLADDER NECK CONTRACTURE, B/L Retrograde pyelogram;  Surgeon: Tangela Birmingham MD;  [facility];  Service: Urology   • MO CYSTOURETHROSCOPY W/DEST &/RMVL MED BLADDER TY N/A 04/16/2020    Procedure: CYSTOSCOPY, TRANSURETHRAL RESECTION OF BLADDER TUMOR (TURBT);   Surgeon: Tangela Birmingham MD;  [facility];  Service: Urology   • MO CYSTOURETHROSCOPY WITH BIOPSY N/A 09/10/2020    Procedure: CYSTOSCOPY, COLLECTION OF LEFT KIDNEY CYTOLOGY, BILATERAL RETROGRADE PYELOGRAM, BLADDER WALL BIOPSIES  AND FULGERAION, RANDOM BLADDER TUMOR BIOPSIES;  Surgeon: Tangela Birmingham MD;  Location: [address] [place] [address] MAIN OR;  Service: Urology   • MO CYSTOURETHROSCOPY WITH BIOPSY N/A 04/05/2022    Procedure: urethroscopy , biopsy of urethra with fulgeration;  Surgeon: Mahamed Ng MD;  Location:  MAIN OR; Service: Urology   • PROSTATE SURGERY     • TONSILLECTOMY     • TRANSURETHRAL RESECTION OF PROSTATE N/A 2018    Procedure: CYSTO, PHOTO SELECTIVE VAPORIZATION OF PROSTATE, B/L RETROGRADE PYELOGRAM, TURBT;  Surgeon: Jaime Valiente MD;  Location: [facility] OR;  Service: Urology   • UMBILICAL HERNIA REPAIR     • UPPER GASTROINTESTINAL ENDOSCOPY         Past Family History  Family History   Problem Relation Age of Onset   • Cancer Mother         Topical oral abrasian caused cancer   • Other Mother         Digestive System Complications   • Diabetes Father    • Heart disease Father    • Hypertension Father    • Coronary artery disease Father    • Hyperlipidemia Father        Past Social history  Social History     Socioeconomic History   • Marital status: /Civil Union     Spouse name: Not on file   • Number of children: Not on file   • Years of education: Not on file   • Highest education level: Not on file   Occupational History   • Occupation: Sales position   Tobacco Use   • Smoking status: Former     Packs/day: 1 00     Years: 10 00     Pack years: 10 00     Types: Cigarettes     Quit date: 1972     Years since quittin 3   • Smokeless tobacco: Never   • Tobacco comments:     Quit at age 28   Vaping Use   • Vaping Use: Never used   Substance and Sexual Activity   • Alcohol use: Not Currently     Alcohol/week: 2 0 standard drinks     Types: 1 Glasses of wine, 1 Cans of beer per week     Comment: Non since 7/15/2020   • Drug use: Never   • Sexual activity: Not Currently     Partners: Female   Other Topics Concern   • Not on file   Social History Narrative    Always uses seatbelt        Caffeine use- Drinks 2 cups of coffee daily     Social Determinants of Health     Financial Resource Strain: Low Risk    • Difficulty of Paying Living Expenses: Not very hard   Food Insecurity: Not on file   Transportation Needs: No Transportation Needs   • Lack of Transportation (Medical):  No   • Lack of Transportation (Non-Medical): No   Physical Activity: Not on file   Stress: Not on file   Social Connections: Not on file   Intimate Partner Violence: Not on file   Housing Stability: Not on file       Current Medications  Current Outpatient Medications   Medication Sig Dispense Refill   • atorvastatin (LIPITOR) 40 mg tablet TAKE ONE TABLET BY MOUTH AT BEDTIME 90 tablet 3   • Blood Glucose Monitoring Suppl (OneTouch Verio Flex System) w/Device KIT Use to check blood sugars daily 1 kit 0   • calcitriol (ROCALTROL) 0 25 mcg capsule Take 1 tablet 2 times a week (Monday, Friday) 24 capsule 3   • Cholecalciferol (VITAMIN D) 50 MCG (2000 UT) tablet Take 2,000 Units by mouth daily     • citalopram (CeleXA) 20 mg tablet TAKE ONE TABLET BY MOUTH EVERY DAY 30 tablet 3   • Farxiga 5 MG TABS TAKE ONE TABLET BY MOUTH EVERY DAY 90 tablet 2   • ferrous sulfate 324 (65 Fe) mg Take 1 tablet (324 mg total) by mouth daily 30 tablet 5   • Lancets (OneTouch Delica Plus [id number]) MISC TEST BLOOD GLUCOSE ONCE DAILY 100 each 0   • losartan (COZAAR) 25 mg tablet Take 25 mg by mouth daily     • magnesium oxide (MAG-OX) 400 mg Take 1 tablet (400 mg total) by mouth in the morning  180 tablet 2   • metoprolol tartrate (LOPRESSOR) 25 mg tablet Take 1 tablet (25 mg total) by mouth every 12 (twelve) hours 180 tablet 3   • Multiple Vitamins-Minerals (OCUVITE-LUTEIN PO) Take 1 tablet by mouth 2 (two) times a day Pt is taking preservision      • Omega-3 Fatty Acids (Fish Oil) 1200 MG CPDR Take by mouth in the morning     • omeprazole (PriLOSEC) 40 MG capsule Take 1 capsule (40 mg total) by mouth in the morning   90 capsule 3   • OneTouch Verio test strip TEST ONCE A  strip 0   • sodium bicarbonate 650 mg tablet Take 2 tablets (1,300 mg total) by mouth 2 (two) times a day 360 tablet 3   • torsemide (DEMADEX) 20 mg tablet Take 1 tablet (20 mg total) by mouth daily 90 tablet 3   • traMADol (ULTRAM) 50 mg tablet TAKE ONE TABLET BY MOUTH EVERY 6 HOURS AS NEEDED FOR MODERATE PAIN 20 tablet 0   • ondansetron (ZOFRAN) 4 mg tablet Take 1 tablet (4 mg total) by mouth every 8 (eight) hours as needed for nausea or vomiting (Patient not taking: Reported on 5/4/2023) 20 tablet 1     No current facility-administered medications for this visit  Allergies  Allergies   Allergen Reactions   • Fentanyl Hallucinations   • Morphine And Related Other (See Comments)     While having an MI patient received morphine and had adverse reaction but doesn't know what happened  Past Medical History, Social History, Family History, medications and allergies were reviewed  Vitals  Vitals:    05/09/23 1451   BP: 126/82   BP Location: Left arm   Patient Position: Sitting   Cuff Size: Adult   Pulse: (!) 50   SpO2: 97%   Weight: 82 6 kg (182 lb)       Physical Exam  Physical Exam    On examination he is in no acute distress  Gait is slow with the assistance of a cane      Results  Lab Results   Component Value Date    PSA 0 4 09/19/2018    PSA 0 4 10/04/2017    PSA 0 4 10/17/2016     Lab Results   Component Value Date    GLUCOSE 128 08/13/2015    CALCIUM 9 2 03/28/2023     08/13/2015    K 4 0 03/28/2023    CO2 24 03/28/2023     03/28/2023    BUN 52 (H) 03/28/2023    CREATININE 2 86 (H) 03/28/2023     Lab Results   Component Value Date    WBC 8 23 03/28/2023    HGB 10 1 (L) 03/28/2023    HCT 31 3 (L) 03/28/2023     (H) 03/28/2023     03/28/2023         Office Urine Dip  No results found for this or any previous visit (from the past 1 hour(s)) ]

## 2023-05-09 NOTE — PROGRESS NOTES
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, status post cystectomy with ileal conduit creation, urethral recurrence, positive PET scan, status post platinum-based chemotherapy      Discussion  I discussed and reviewed the results of his PET scan in the office today which shows a metabolically active 1 4 cm pelvic sidewall lesion suspicious for recurrent disease  He is scheduled to see Dr Bernardino Aragon and colleagues later this week  Per his most recent note if the PET scan shows metabolically active lesions the neck step would be maintenance Avelumab  I will defer immunotherapy to Dr Bernardino Aragon and his team   I recommend that he return in follow-up in the next 3 to 4 months  In the interim he denies any blood per urethra and is not interested in returning to the operating room for ureteroscopy with fulguration  History of Present Illness  80 y o  male with a complicated urologic history including 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  He ultimately underwent radical cystoprostatectomy with ileal conduit creation  Postoperatively he was found to have recurrent disease in the right iliac chain up to the aortic bifurcation and was initially treated with pembrolizumab  Ultimately he was treated with carboplatinum and gemcitabine  When I performed urethroscopy I found carpeting of tumor of his urethra  This was biopsied and was most consistent with high-grade but noninvasive urothelial carcinoma  This was performed just over 1 year ago in the spring 2022  At his last office visit in January I attempted ureteroscopy but the proximal ureter was stenotic likely from fulguration  Fortunately he denies any blood per meatus  Recent CT PET scan shows metabolically active 1 4 cm left pelvic sidewall lesion  AUA Symptom Score      Review of Systems  Review of Systems   Constitutional: Negative  HENT: Negative  Eyes: Negative  Respiratory: Negative  Cardiovascular: Negative  Gastrointestinal: Negative  Endocrine: Negative  Genitourinary:        Per HPI   Musculoskeletal: Negative  Skin: Negative  Allergic/Immunologic: Negative  Neurological: Negative  Hematological: Negative  Psychiatric/Behavioral: Negative            Past Medical History  Past Medical History:   Diagnosis Date   • Acute kidney injury ([facility] [address] )    • Anemia Jan 2022   • Arthritis     Hands   • Wright esophagus    • BPH with obstruction/lower urinary tract symptoms    • CAD (coronary artery disease)     Last assessed 09/16/15   • Cancer ([facility] )     bladder   • Cataract, acquired     Last assessed 10/10/17   • Chronic kidney disease    • Coronary artery disease    • Diabetes mellitus ([facility] )     NIDDM   • Diabetic neuropathy ([facility] )     Feet   • Enlarged prostate with lower urinary tract symptoms (LUTS)     Last assessed 10/10/17   • Erectile dysfunction    • GERD (gastroesophageal reflux disease)     Last assessed 10/10/17   • Hemoptysis     Last assessed 03/14/16   • Hypercholesterolemia     Last assessed 10/10/17   • Hypertension     Last assessed 10/10/17   • Kidney stone    • Macular degeneration     Right eye is particularly affected-peripheral vision intact   • Myocardial infarction [facility] 1998   • OAB (overactive bladder)    • Testicular hypofunction    • Testicular hypogonadism     Last assessed 10/10/17   • Tinnitus    • Umbilical hernia     Last assessed 06/18/14   • Urge incontinence of urine    • Wears glasses        Past Social History  Past Surgical History:   Procedure Laterality Date   • BLADDER SURGERY     • COLONOSCOPY     • CORONARY ARTERY BYPASS GRAFT  07/16/2014    ABG x 4 LIMA to LAD,SVG to diagnoal 2 SVG to OM-1, SVG to PDA, resection of partial plerual mass   • CT GUIDED [facility] DRAINAGE CATHETER PLACEMENT  02/19/2021   • CYSTOSCOPY  2013   • CYSTOSCOPY  02/08/2022    [place]   • ESOPHAGOGASTRODUODENOSCOPY     • FL RETROGRADE PYELOGRAM  12/20/2018   • FL RETROGRADE PYELOGRAM  12/05/2019   • INGUINAL HERNIA REPAIR Bilateral 2015   • IR DRAINAGE TUBE CHECK AND/OR REMOVAL  03/05/2021   • PHOTODYNAMIC THERAPY      For Barretts esophagus   • ID COLONOSCOPY FLX DX W/COLLJ SPEC WHEN PFRMD N/A 04/25/2016    Procedure: COLONOSCOPY;  Surgeon: Husam King MD;  [facility]; Service: Gastroenterology   • ID CYSTO W/REMOVAL OF LESIONS SMALL N/A 12/05/2019    Procedure: CYSTO W/TURBT AND TRANSURETHRAL PROSTATE BIOPSY AND OPENING OF BLADDER NECK CONTRACTURE, B/L Retrograde pyelogram;  Surgeon: Landon Berry MD;  [facility];  Service: Urology   • ID CYSTOURETHROSCOPY W/DEST &/RMVL MED BLADDER TY N/A 04/16/2020    Procedure: CYSTOSCOPY, TRANSURETHRAL RESECTION OF BLADDER TUMOR (TURBT);   Surgeon: Landon Berry MD;  [facility];  Service: Urology   • ID CYSTOURETHROSCOPY WITH BIOPSY N/A 09/10/2020    Procedure: CYSTOSCOPY, COLLECTION OF LEFT KIDNEY CYTOLOGY, BILATERAL RETROGRADE PYELOGRAM, BLADDER WALL BIOPSIES  AND FULGERAION, RANDOM BLADDER TUMOR BIOPSIES;  Surgeon: Landon Berry MD;  Location: [address] [place] [address] MAIN OR;  Service: Urology   • ID CYSTOURETHROSCOPY WITH BIOPSY N/A 04/05/2022    Procedure: urethroscopy , biopsy of urethra with fulgeration;  Surgeon: Rahat Munson MD;  Location:  MAIN OR;  Service: Urology   • PROSTATE SURGERY     • TONSILLECTOMY     • TRANSURETHRAL RESECTION OF PROSTATE N/A 12/20/2018    Procedure: CYSTO, PHOTO SELECTIVE VAPORIZATION OF PROSTATE, B/L RETROGRADE PYELOGRAM, TURBT;  Surgeon: Landon Berry MD;  [facility];  Service: Urology   • UMBILICAL HERNIA REPAIR  2012   • UPPER GASTROINTESTINAL ENDOSCOPY         Past Family History  Family History   Problem Relation Age of Onset   • Cancer Mother         Topical oral abrasian caused cancer   • Other Mother         Digestive System Complications   • Diabetes Father    • Heart disease Father    • Hypertension Father    • Coronary artery disease Father    • Hyperlipidemia Father        Past Social history  Social History     Socioeconomic History   • Marital status: /Civil Union     Spouse name: Not on file   • Number of children: Not on file   • Years of education: Not on file   • Highest education level: Not on file   Occupational History   • Occupation: Sales position   Tobacco Use   • Smoking status: Former     Packs/day: 1 00     Years: 10 00     Pack years: 10 00     Types: Cigarettes     Quit date: 1972     Years since quittin 3   • Smokeless tobacco: Never   • Tobacco comments:     Quit at age 28   Vaping Use   • Vaping Use: Never used   Substance and Sexual Activity   • Alcohol use: Not Currently     Alcohol/week: 2 0 standard drinks     Types: 1 Glasses of wine, 1 Cans of beer per week     Comment: Non since 7/15/2020   • Drug use: Never   • Sexual activity: Not Currently     Partners: Female   Other Topics Concern   • Not on file   Social History Narrative    Always uses seatbelt        Caffeine use- Drinks 2 cups of coffee daily     Social Determinants of Health     Financial Resource Strain: Low Risk    • Difficulty of Paying Living Expenses: Not very hard   Food Insecurity: Not on file   Transportation Needs: No Transportation Needs   • Lack of Transportation (Medical): No   • Lack of Transportation (Non-Medical):  No   Physical Activity: Not on file   Stress: Not on file   Social Connections: Not on file   Intimate Partner Violence: Not on file   Housing Stability: Not on file       Current Medications  Current Outpatient Medications   Medication Sig Dispense Refill   • atorvastatin (LIPITOR) 40 mg tablet TAKE ONE TABLET BY MOUTH AT BEDTIME 90 tablet 3   • Blood Glucose Monitoring Suppl (OneTouch Verio Flex System) w/Device KIT Use to check blood sugars daily 1 kit 0   • calcitriol (ROCALTROL) 0 25 mcg capsule Take 1 tablet 2 times a week (Monday, Friday) 24 capsule 3   • Cholecalciferol (VITAMIN D) 50 MCG (2000 UT) tablet Take 2,000 Units by mouth daily     • citalopram (CeleXA) 20 mg tablet TAKE ONE TABLET BY MOUTH EVERY DAY 30 tablet 3   • Farxiga 5 MG TABS TAKE ONE TABLET BY MOUTH EVERY DAY 90 tablet 2   • ferrous sulfate 324 (65 Fe) mg Take 1 tablet (324 mg total) by mouth daily 30 tablet 5   • Lancets (OneTouch Delica Plus [id number]) MISC TEST BLOOD GLUCOSE ONCE DAILY 100 each 0   • losartan (COZAAR) 25 mg tablet Take 25 mg by mouth daily     • magnesium oxide (MAG-OX) 400 mg Take 1 tablet (400 mg total) by mouth in the morning  180 tablet 2   • metoprolol tartrate (LOPRESSOR) 25 mg tablet Take 1 tablet (25 mg total) by mouth every 12 (twelve) hours 180 tablet 3   • Multiple Vitamins-Minerals (OCUVITE-LUTEIN PO) Take 1 tablet by mouth 2 (two) times a day Pt is taking preservision      • Omega-3 Fatty Acids (Fish Oil) 1200 MG CPDR Take by mouth in the morning     • omeprazole (PriLOSEC) 40 MG capsule Take 1 capsule (40 mg total) by mouth in the morning  90 capsule 3   • OneTouch Verio test strip TEST ONCE A  strip 0   • sodium bicarbonate 650 mg tablet Take 2 tablets (1,300 mg total) by mouth 2 (two) times a day 360 tablet 3   • torsemide (DEMADEX) 20 mg tablet Take 1 tablet (20 mg total) by mouth daily 90 tablet 3   • traMADol (ULTRAM) 50 mg tablet TAKE ONE TABLET BY MOUTH EVERY 6 HOURS AS NEEDED FOR MODERATE PAIN 20 tablet 0   • ondansetron (ZOFRAN) 4 mg tablet Take 1 tablet (4 mg total) by mouth every 8 (eight) hours as needed for nausea or vomiting (Patient not taking: Reported on 5/4/2023) 20 tablet 1     No current facility-administered medications for this visit  Allergies  Allergies   Allergen Reactions   • Fentanyl Hallucinations   • Morphine And Related Other (See Comments)     While having an MI patient received morphine and had adverse reaction but doesn't know what happened         Past Medical History, Social History, Family History, medications and allergies were reviewed  Vitals  Vitals:    05/09/23 1451   BP: 126/82   BP Location: Left arm   Patient Position: Sitting   Cuff Size: Adult   Pulse: (!) 50   SpO2: 97%   Weight: 82 6 kg (182 lb)       Physical Exam  Physical Exam    On examination he is in no acute distress  Gait is slow with the assistance of a cane      Results  Lab Results   Component Value Date    PSA 0 4 09/19/2018    PSA 0 4 10/04/2017    PSA 0 4 10/17/2016     Lab Results   Component Value Date    GLUCOSE 128 08/13/2015    CALCIUM 9 2 03/28/2023     08/13/2015    K 4 0 03/28/2023    CO2 24 03/28/2023     03/28/2023    BUN 52 (H) 03/28/2023    CREATININE 2 86 (H) 03/28/2023     Lab Results   Component Value Date    WBC 8 23 03/28/2023    HGB 10 1 (L) 03/28/2023    HCT 31 3 (L) 03/28/2023     (H) 03/28/2023     03/28/2023         Office Urine Dip  No results found for this or any previous visit (from the past 1 hour(s)) ]

## 2023-05-11 ENCOUNTER — TELEPHONE (OUTPATIENT)
Dept: HEMATOLOGY ONCOLOGY | Facility: CLINIC | Age: 83
End: 2023-05-11

## 2023-05-11 ENCOUNTER — OFFICE VISIT (OUTPATIENT)
Dept: HEMATOLOGY ONCOLOGY | Facility: CLINIC | Age: 83
End: 2023-05-11

## 2023-05-11 VITALS
TEMPERATURE: 97.9 F | RESPIRATION RATE: 18 BRPM | HEART RATE: 64 BPM | HEIGHT: 67 IN | SYSTOLIC BLOOD PRESSURE: 136 MMHG | OXYGEN SATURATION: 97 % | DIASTOLIC BLOOD PRESSURE: 88 MMHG | BODY MASS INDEX: 28.56 KG/M2 | WEIGHT: 182 LBS

## 2023-05-11 DIAGNOSIS — G89.29 CHRONIC BILATERAL LOW BACK PAIN WITHOUT SCIATICA: ICD-10-CM

## 2023-05-11 DIAGNOSIS — C67.8 MALIGNANT NEOPLASM OF OVERLAPPING SITES OF BLADDER (HCC): ICD-10-CM

## 2023-05-11 DIAGNOSIS — R59.0 PELVIC LYMPHADENOPATHY: ICD-10-CM

## 2023-05-11 DIAGNOSIS — N18.4 STAGE 4 CHRONIC KIDNEY DISEASE (HCC): ICD-10-CM

## 2023-05-11 DIAGNOSIS — M25.552 PAIN OF LEFT HIP: Primary | ICD-10-CM

## 2023-05-11 DIAGNOSIS — M54.50 CHRONIC BILATERAL LOW BACK PAIN WITHOUT SCIATICA: ICD-10-CM

## 2023-05-11 DIAGNOSIS — C67.5 MALIGNANT NEOPLASM OF URINARY BLADDER NECK (HCC): ICD-10-CM

## 2023-05-11 DIAGNOSIS — D49.4 BLADDER TUMOR: ICD-10-CM

## 2023-05-11 DIAGNOSIS — R07.81 RIB PAIN ON LEFT SIDE: ICD-10-CM

## 2023-05-11 NOTE — PROGRESS NOTES
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, recurrence status post mitomycin instillation  Cystoscopy on 04/2020 showed carcinoma in situ no evidence of invasive component     He received Pembrolizumab 200 mg flat dose every 3 weeks on 05/26/2020  After 2 cycles he had grade 3 elevation of the liver enzymes, thickening of the skin of the forearms consistent with toxicity to Pembrolizumab    Status post cystoprostatectomy with urinary diversion to an ileal conduit on 10/26/2020       2  5/2022 pet/ct-  recurrent disease in the right iliac, pelvic area with extension into the aortic bifurcation      Evaluated by Dr Patricia Espitia at [facility], urethectomy discussed but ultimately not a surgical candidate  9/2022 pelvic MRI - Multiple suspicious and indeterminate left common, external, and internal iliac lymph nodes up to the aortoiliac bifurcation  Retroperitoneal lymphatic chain is not imaged in entirety  Indeterminate right common iliac lymph nodes  Suspicious lymph node or implant about the left obturator internus  Small pelvic peritoneal fluid of uncertain nature     Initiated on carboplatin/gemcitabine on 09/30/2022  CT scan on December 2022 showed no evidence of disease in the abdomen or pelvis     Therapy was complicated with anemia requiring dose reductions and Aranesp which had been beneficial        3  CKD   Cr 2 2-3 3 since 1/2021  Follows with Dr Bassam Crawford        4   CT scan on March 2023 a showed mesenteric lymphadenopathy stable, right iliac lymphadenopathy stable, no new lesions this might be reactive versus residual disease  To distinguish;  PET scan 5/1/23 performed  1 4 x 1 7 cm metabolically active lesion in the left pelvic sidewall (SUV max 5 0)   In directed retrospect, the lesion is similar in size compared to recent CT examinations dating back to 11/30/2022, but new from an older CT of   10/8/2021  Findings are suspicious for recurrence of disease    Previously noted 1 cm fluid-attenuating lesion in the right pelvic sidewall is not metabolically active on PET  Interval resolution of the previously noted mesenteric lymph node  Reviewed PET/CT results with the patient and his wife  We discussed ongoing observation versus retrial of a different immunotherapy  Avelumab recommended per Dr Santi Garcia  Discussed potential side effects which could include but may not be limited to: Fatigue, cytopenias, cough, rash, elevation in liver function tests, nausea, electrolyte disturbances, dyspnea, arthralgias  Rare but potentially very serious immune-mediated side effects such as colitis, pneumonitis, hepatitis, nephritis, thyroid dysfunction, adrenal insufficiency  Questions asked and answered  Signed informed consent obtained    Avelumab 800 mg every 2 weeks ordered  CBCD, CMP, thyroid studies every 6 weeks    Left hip pain status post mechanical fall April 2023  No evidence of fracture on plain films in April or on PET/CT May 2023  Discussed trial of PT    He is agreeable      HPI:    43-year-old  male with history of hypertension, coronary artery disease status post open heart surgery, diabetes mellitus type 2, diabetic neuropathy, he had a history of recurrent superficial bladder cancer status post many BCG unfortunately refractory to BCG, he had 1 time trans urethral resection of the bladder tumor and mitomycin instillation     Repeat cystoscopy on 04/16/2020 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, no evidence of invasive bladder cancers     He does not smoke, he drinks bourbon and vodka every night     Denies any headache blurred vision nausea vomiting diarrhea constipation dysuria hematuria melena hematochezia heat or cold intolerance      Initiated on Pembrolizumab 200 mg flat dose every 3 weeks on 05/26/2020, after 2 doses there was grade 3 elevation in the liver enzyme, alkaline phosphatase requiring discontinuation of Pembrolizumab, and later on normalization of the liver enzymes     Evaluated by ID there was no evidence of TB of the liver    Made prn 9/8/2020         Status post radical cystoprostatectomy to ileal conduit urinary diversion on 10/26/2020     Urethral Biopsy 4/5/22  A  Urethra (biopsy):  - High grade, non-invasive papillary urothelial carcinoma   - No muscularis propria identified    9/13/22 CT scan abdomen at [facility]- Few mildly enlarged mesenteric and retroperitoneal lymph nodes  Scattered small nodules at the lung bases, favored to be benign  Possibility of urethrectomy discussed    Ultimately, he was not a candidate      Represented to our office 9/20/22 9/30/22 Carboplatin AUC 4 day 1, gemcitabine 1000 mg per m2 on days 1 and 8 every 21 days initiated        Treatment changed to gemcitabine 800 mg per m2, carboplatin AUC 4 q 2 weeks with cycle 5 6/95/24  complicated with anemia requiring holding chemotherapy in December 2022     CAT scan in December 2022 showed no evidence of lymphadenopathy in the abdomen or pelvis     He continued gemcitabine/carboplatin and Aranesp 200 mcg every other week for anemia induced by chemotherapy; finished a total of 6 cycles     CAT scan in March 2023 showed stable mesenteric lymphadenopathy, right iliac lymphadenopathy might be reactive or consistent with metastatic disease     To distinguish we will order PET scan if he has residual disease we will start maintenance avelumab        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 initiated on Aranesp, with exacerbation of congestive heart failure by anemia,    Molecular tests:     TERT, ARID1A mutation       Interval History:   5/1/23-  Pet/ct  1 4 x 1 7 cm metabolically active lesion in the left pelvic sidewall (series 3, image 256; SUV max 5 0)  In directed retrospect, the lesion is similar in size compared to recent CT examinations dating back to 11/30/2022, but new from an older CT of   10/8/2021  Findings are suspicious for recurrence of disease      2  Previously noted 1 cm fluid-attenuating lesion in the right pelvic sidewall (series 1200, image 162) is not metabolically active on PET      3  Interval resolution of the previously noted mesenteric lymph node  Sustained a mechanical fall 6 weeks ago  Still having significant back pain, left hip pain  4/11/23  X-rays of left hip show degenerative changes, no fracture or dislocation  No rib fractures    Osseous structures unremarkable on PET/CT May 1, 2023        Review of Systems   Constitutional: Negative for appetite change, chills, diaphoresis, fatigue, fever and unexpected weight change  HENT:   Negative for mouth sores, nosebleeds, sore throat, tinnitus and voice change  Eyes: Negative for eye problems  Respiratory: Negative for chest tightness, cough, shortness of breath and wheezing  Cardiovascular: Negative for chest pain, leg swelling and palpitations  Gastrointestinal: Negative for abdominal distention, abdominal pain, blood in stool, constipation, diarrhea, nausea, rectal pain and vomiting  Endocrine: Negative for hot flashes  Genitourinary: Negative  Musculoskeletal: Negative for gait problem and myalgias  Skin: Negative for itching and rash  Neurological: Negative for dizziness, gait problem, headaches, light-headedness and numbness  Hematological: Negative for adenopathy  Psychiatric/Behavioral: Negative for confusion and sleep disturbance  The patient is not nervous/anxious           Test Results:        Labs:   Lab Results   Component Value Date    HGB 10 1 (L) 03/28/2023    HCT 31 3 (L) 03/28/2023     (H) 03/28/2023     03/28/2023    WBC 8 23 03/28/2023    NRBC 0 03/28/2023    ATYLMPCT 5 (H) 02/21/2021     Lab Results   Component Value Date     08/13/2015    K 4 0 03/28/2023     03/28/2023    CO2 24 03/28/2023    ANIONGAP 10 08/13/2015    BUN 52 (H) 03/28/2023    CREATININE 2 86 (H) 03/28/2023    GLUCOSE 128 08/13/2015    GLUF 176 (H) 03/28/2023    CALCIUM 9 2 03/28/2023    CORRECTEDCA 9 6 12/27/2022    AST 26 03/07/2023    ALT 42 03/07/2023    ALKPHOS 97 03/07/2023    PROT 6 8 08/13/2015    BILITOT 0 58 08/13/2015    EGFR 19 03/28/2023           Imaging: XR ribs bilateral 4+ vw w pa chest    Result Date: 4/13/2023  Narrative: BILATERAL RIBS AND CHEST INDICATION:   R07 81: Pleurodynia  Pain on left side status post fall 3 weeks ago  COMPARISON:  CXR 12/16/2022 and chest CT 3/20/2023  VIEWS:  XR RIBS BILATERAL 4+ VW W PA CHEST FINDINGS: No acute displaced rib fracture or pathologic bone lesion  No pneumothorax  Small chronic loculated left pleural effusion  Lungs clear  Soft tissues normal  Cardiomediastinal silhouette normal  CABG  Impression: No acute displaced rib fracture or pathologic bone lesion  No acute cardiopulmonary disease  Workstation performed: [id number]     XR hip/pelv 2-3 vws left if performed    Result Date: 4/15/2023  Narrative: LEFT HIP INDICATION:   R10 32: Left lower quadrant pain  COMPARISON:  None VIEWS:  XR HIP/PELV 2-3 VWS LEFT  W PELVIS IF PERFORMED Images: 3 FINDINGS: There is no acute fracture or dislocation  Mild left hip osteoarthritis is seen  No lytic or blastic osseous lesion  Small peripheral pelvic calcifications which may represent calcified phleboliths  Degenerative changes pubic symphysis and visualized lower lumbar spine  Impression: Mild degenerative changes of left hip   Workstation performed: [id number]     NM PET CT skull base to mid thigh    Result Date: 5/2/2023  Narrative: PET/CT SCAN INDICATION: C67 5: Malignant neoplasm of bladder neck MODIFIER: PI PS COMPARISON: CT of the chest/abdomen/pelvis  3/20/2023, abdominal CT 11/30/2022  CELL TYPE:   high grade urothelial carcinoma (bx 12/20/18 urinary bladder +) TECHNIQUE:   8 4 mCi F-18-FDG administered IV  Multiplanar attenuation corrected and non attenuation corrected PET images are available for interpretation, and contiguous, low dose, axial CT sections were obtained from the skull vertex through the femurs  Intravenous contrast material was not utilized  This examination, like all CT scans performed in the [facility], was performed utilizing techniques to minimize radiation dose exposure, including the use of iterative reconstruction and automated exposure control  Fasting serum glucose: 150 mg/dl FINDINGS: VISUALIZED BRAIN: No acute abnormalities are seen  HEAD/NECK: There is a physiologic distribution of FDG  No FDG avid cervical adenopathy is seen  CT images: Unremarkable  CHEST: No FDG avid soft tissue lesions are seen  CT images: Cardiomegaly  Postsurgical changes of CABG  Native coronary artery calcifications  Small bilateral pleural effusions, left more than right  Bilateral gynecomastia, left more than right  Previously noted sub-6 mm pulmonary nodules are better identified on the prior dedicated CT chest, and too small to characterize uptake on PET  ABDOMEN: No FDG avid soft tissue lesions are seen  CT images: Atherosclerotic changes of the aorta and branching vessels  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  Multiple surgical clips from retroperitoneal and pelvic sidewall josh dissection  Midline scarring in the anterior abdominal wall  Cholelithiasis  Postsurgical changes from ileal conduit  Interval resolution of the previously noted mesenteric lymph nodes with infiltration of the fat  PELVIS: There is a 1 4 x 1 7 cm metabolically active lesion in the left pelvic sidewall (series 3, image 256; SUV max 5 0)   The lesion is similar in size compared to recent CT examinations dating back to 11/30/2022, but new from an older CT of 10/8/2021  Previously noted 1 cm fluid-attenuating lesion in the right pelvic sidewall (series 1200, image 122) is not metabolically active on PET  CT images: Otherwise, unremarkable  OSSEOUS STRUCTURES: No FDG avid lesions are seen  CT images: Degenerative changes in the spine  Impression: 1  1 4 x 1 7 cm metabolically active lesion in the left pelvic sidewall (series 3, image 256; SUV max 5 0)  In directed retrospect, the lesion is similar in size compared to recent CT examinations dating back to 11/30/2022, but new from an older CT of 10/8/2021  Findings are suspicious for recurrence of disease  2  Previously noted 1 cm fluid-attenuating lesion in the right pelvic sidewall (series 1200, image 057) is not metabolically active on PET  3  Interval resolution of the previously noted mesenteric lymph node  The study was marked in EPIC for significant notification  Workstation performed: [id number]     VAS vessel mapping for hemodialysis upper    Result Date: 5/9/2023  Narrative:  [facility] REPORT CLINICAL: Indications: Pre-Op evaluation for future dialysis AVF  Patient has chronic kidney disease  Patient is Right handed  Operative History: 2014-07-15 CABG Risk Factors The patient has history of HTN, Diabetes (NIDDM (oral meds)) and Hyperlipidemia  Clinical Right Pressure:  140/80 mm Hg, Left Pressure:  145/84 mm Hg  FINDINGS:  Rt Medial Brach V      Diameter AP (mm): 3 7      Depth (mm): 9 3 Medial Brachial V      Diameter AP (mm): 4 3      Depth (mm): 7 4 Subclavian, Right      PSV (cm/s): 130 Subclavian, Right      Impression: Normal Brachial Artery, Right      Impression: Normal      Diameter AP (mm): 4 2      Depth (mm): 8 4      PSV (cm/s): 108 Prox   Radial, Right      Impression: Normal      Diameter AP (mm): 2 2      Depth (mm): 5 8      PSV (cm/s): 95 Dist  Radial Artery, Right      Impression: Normal      Diameter AP (mm): 2  2      PSV (cm/s): 74 Dist  Ulnar Artery, Right      Impression: Normal      Diameter AP (mm): 3 4      PSV (cm/s): 71 Prox  Ulnar, Right      Impression: Normal      Diameter AP (mm): 4 7      PSV (cm/s): 71 Axillary, Right      Impression: Normal Bas Mid Arm, Right      Impression: Normal      Diameter AP (mm): 4 2      Depth (mm): 6 7 Basilic Lower Arm, Right      Diameter AP (mm): 3 4      Depth (mm): 4 7 Bas AC, Right      Diameter AP (mm): 2 5      Depth (mm): 2 3 Basilic Upper Forearm, Right      Diameter AP (mm): 2 0      Depth (mm): 1 7 Bas Mid Forearm, Right      Diameter AP (mm): 1 9 Ceph Mid Arm, Right      Diameter AP (mm): 1 6      Depth (mm): 9 2 Cephalic Lower Arm, Right      Diameter AP (mm): 0 7      Depth (mm): 1 7 Ceph AC, Right      Impression: Chronic thrombus Cephalic Upper Forearm, Right      Impression: Chronic thrombus Ceph Mid Forearm, Right      Diameter AP (mm): 1 2      Depth (mm): 2 7 Innominate Vein, Right      Impression: Normal Int  Jugular, Right      Impression: Normal Subclavian, Left      PSV (cm/s): 143 Subclavian, Left      Impression: Normal Brachial Artery, Left      Impression: Normal      Diameter AP (mm): 4 9      Depth (mm): 10 5      PSV (cm/s): 94 Prox  Radial, Left      Impression: Normal      PSV (cm/s): 106 Dist  Radial Artery, Left      Impression: Normal      Diameter AP (mm): 2 3      PSV (cm/s): 60 Dist  Ulnar Artery, Left      Impression: Normal      PSV (cm/s): 56 Prox   Ulnar, Left      Impression: Normal      Diameter AP (mm): 4 7      PSV (cm/s): 66 Axillary, Left      Impression: Normal Bas Mid Arm, Left      Impression: Normal      Diameter AP (mm): 4 3      Depth (mm): 6 9 Basilic Lower Arm, Left      Diameter AP (mm): 3 2      Depth (mm): 4 7 Bas AC, Left      Diameter AP (mm): 2 4      Depth (mm): 4 0 Basilic Upper Forearm, Left      Diameter AP (mm): 2 1      Depth (mm): 2 9 Bas Mid Forearm, Left      Diameter AP (mm): 2 4      Depth (mm): 2 9 Ceph Upper, Left      Impression: Normal      Diameter AP (mm): 2 4      Depth (mm): 4 5 Ceph Mid Arm, Left      Diameter AP (mm): 2 1      Depth (mm): 2 1 Cephalic Lower Arm, Left      Diameter AP (mm): 2 5      Depth (mm): 2 3 Cephalic Upper Forearm, Left      Diameter AP (mm): 2 5      Depth (mm): 4 2 Ceph Mid Forearm, Left      Diameter AP (mm): 3 0      Depth (mm): 2 6 Ceph Wrist, Left      Diameter AP (mm): 3 5      Depth (mm): 1 6 Innominate Vein, Left      Impression: Normal Int  Jugular, Left      Impression: Normal    CONCLUSION:  Impression RIGHT ARM: The cephalic and basilic veins communicate with the median cubital vein  The cephalic vein showed chronic, occlusive intraluminal thrombus formation  The basilic vein is patent and remains >2mm in diameter throughout the arm The brachial artery measures 4 2 mm in its greatest diameter and bifurcates at the [facility] fossa  The subclavian, axillary and brachial arteries are widely patent  The IJV, subclavian, axillary and brachial veins are patent  LEFT ARM: The cephalic and basilic veins communicate with the median cubital vein  The cephalic vein is patent and remains >2mm in diameter throughout the arm, torniquet was applied  The basilic vein is patent and remains >2mm in diameter throughout the arm The brachial artery measures 4 9 mm in its greatest diameter and bifurcates at the [facility] fossa  The subclavian, axillary and brachial arteries are widely patent  The IJV, subclavian, axillary and brachial veins are patent  ENDOAVF SCREENING: The right arm demonstrates a patent  measuring >2mm that connects to the Radial/ Ulnar vein  The left arm demonstrates a patent  measuring >2mm that connects to the Radial vein  SIGNATURE: Electronically Signed by: Cristin Pa MD on 2023-05-09 06:05:31 PM            Allergies:    Allergies   Allergen Reactions   • Fentanyl Hallucinations   • Morphine And Related Other (See Comments)     While having an MI patient "received morphine and had adverse reaction but doesn't know what happened       Current Medications: Reviewed  PMH/FH/SH:  Reviewed      Physical Exam:    1 94 meters squared    Ht Readings from Last 3 Encounters:   05/11/23 5' 7\" (1 702 m)   05/04/23 5' 7\" (1 702 m)   04/11/23 5' 7\" (1 702 m)        Wt Readings from Last 3 Encounters:   05/09/23 82 6 kg (182 lb)   05/04/23 82 1 kg (181 lb)   04/11/23 85 kg (187 lb 6 4 oz)        Temp Readings from Last 3 Encounters:   04/11/23 98 1 °F (36 7 °C) (Tympanic)   03/30/23 98 1 °F (36 7 °C) (Temporal)   03/09/23 (!) 97 4 °F (36 3 °C) (Temporal)        BP Readings from Last 3 Encounters:   05/09/23 126/82   05/04/23 152/84   04/11/23 128/80             Physical Exam    ECOG:       Emergency Contacts:    Extended Emergency Contact Information  Primary Emergency Contact: Sheyla Wheeler  Address: [address] [place] [address] Phone: [phone]  Mobile Phone: [phone]  Relation: Spouse  Secondary Emergency Contact: Dorota Kee   [place]  Mobile Phone: [phone]  Relation: Daughter   "

## 2023-05-11 NOTE — TELEPHONE ENCOUNTER
Check out comments: 4-6 weeks Dr Cassandra Weiner treatment start     Please schedule treatment and f/u appt  Thank you!

## 2023-05-12 DIAGNOSIS — C67.5 MALIGNANT NEOPLASM OF URINARY BLADDER NECK (HCC): ICD-10-CM

## 2023-05-12 DIAGNOSIS — N18.4 ANEMIA DUE TO STAGE 4 CHRONIC KIDNEY DISEASE (HCC): Primary | ICD-10-CM

## 2023-05-12 DIAGNOSIS — D49.4 BLADDER TUMOR: ICD-10-CM

## 2023-05-12 DIAGNOSIS — R82.89 POSITIVE URINARY CYTOLOGY: ICD-10-CM

## 2023-05-12 DIAGNOSIS — D63.1 ANEMIA DUE TO STAGE 4 CHRONIC KIDNEY DISEASE (HCC): Primary | ICD-10-CM

## 2023-05-12 DIAGNOSIS — C67.8 MALIGNANT NEOPLASM OF OVERLAPPING SITES OF BLADDER (HCC): ICD-10-CM

## 2023-05-12 NOTE — TELEPHONE ENCOUNTER
While we try to accommodate patient requests, our priority is to schedule treatment according to Doctor's orders and site availability  Does the Provider use the intake sheet or checkout note? What would be a preferred day of the week that would work best for your infusion appointment? Tuesday or Thursdays   Do you prefer mornings or afternoons for your appointments? Afternoons   Are there any days or dates that do not work for your schedule, including any upcoming vacations? N/A   We are going to try our best to schedule you at the infusion center closest to your home  In the event that we are unable to what would be your next preferred infusion site or sites? 1  [facility] infusion   2  [facility] infusion    Per pt please call him directly if [facility] or [facility] does not have availability to start before June  Per patient he did go to [facility] before and does not want to go to that infusion site due to it being hard to get too  Do you have transportation to take you to all of your appointments?  Yes   Would you like the infusion center to draw labs from your port? (disregard if patient doesn't have a port or need labs for infusion appointment)

## 2023-05-12 NOTE — TELEPHONE ENCOUNTER
Patient is scheduled for D1C1 on 5/25 as listed on appointment desk  Also want to confirm is patient still receiving aranesp injections? Thanks

## 2023-05-12 NOTE — TELEPHONE ENCOUNTER
Please also schedule Aranesp injections, tuesdays or thursdays afternoons work best for patient  Thank you!

## 2023-05-12 NOTE — TELEPHONE ENCOUNTER
Just want to confirm that patient has been scheduled for aranesp injections to already scheduled infusions  Thank you!

## 2023-05-14 DIAGNOSIS — C67.8 MALIGNANT NEOPLASM OF OVERLAPPING SITES OF BLADDER (HCC): ICD-10-CM

## 2023-05-15 RX ORDER — TRAMADOL HYDROCHLORIDE 50 MG/1
TABLET ORAL
Qty: 20 TABLET | Refills: 0 | Status: SHIPPED | OUTPATIENT
Start: 2023-05-15

## 2023-05-15 NOTE — PROGRESS NOTES
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)  -most likely recurrence of his bladder cancer  -about to start immunotherapy for a duration of 2-3 months (per pt)  -I think that this treatment should take precedent given his relatively stable CKD, dialysis does not seem immanent; if his immune therapy was expected to cause significant renal damage, would consider fistula creation sooner    Stage 4 chronic kidney disease ([facility] [address] )  -     Ambulatory referral to Vascular Surgery  -Cr: 1 86  GFR: 19  -referred for first access  -reviewed vein mapping which shows adequate cephalic and basilic vein on the left, with a radial  and communicating median antecubital vein; did require tourniquet to achieve cephalic diameter >3NP  -discussed options for dialysis including catheter, PD, fistula, endoAVF, and graft; patient would like to have hemodialysis; discussed surgical fistula vs endoAVF in detail and risks and benefits of each; discussed benefits of endoAVF in that this would be done with sedation, no incision, but also that this would require a minimum of 2 procedures and would use some/minimal contrast to perform; patient would like to proceed with endoAVF  -although I do think patient will ultimately be a good candidate for endoAVF, I do not think it is a good idea to do this as he is starting his new cancer treatment next week; he would be at increased risk of infection and non or delayed healing; as this is planned for only a 2-3month course, I think it makes more sense to allow him to complete his cancer treatment and then proceed with fistula creation; if I have misunderstood this treatment regimen, the plan/timing can be adjusted accordingly; I have made patient a f/u appt for aug around the time his treatments are completed    Ischemic cardiomyopathy  Coronary artery disease involving native coronary artery of native heart without angina pectoris  Chronic diastolic CHF (congestive heart failure) ([facility] [address] )  Hx of CABG  -TTE 12/22 which shows EF: 45%, mild global hypokinesis, severe diastolic dysfunction; PAP: 58    Subjective:      Patient ID: Lai Laura is a 80 y o  male  Patient present to review HD done on 5/7/23  Patient c/o left groin pain along with the lower back  Patient states the pain started about 7 weeks ago  Patient is not on dialysis  Patient is currently taking Atorvastatin  HPI:    Patient referred for HD access planning  Patient is CKD, not yet on HD  R-handed  Has pain in the left hip with walking  Had a fall and fell on a doorknob  Has CAD and hx of CABG '98  Denies any further cardiac interventions  Denies CP  Does minimal activity but no SOB  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, status post cystectomy with ileal conduit creation, urethral recurrence, positive PET scan, status post platinum-based chemotherapy '20  He had a ureteral finding last year  Now w/ PET + pelvic wall lesion and planned for upcoming immunotherapy  Taking statin  The following portions of the patient's history were reviewed and updated as appropriate: allergies, current medications, past family history, past medical history, past social history, past surgical history and problem list     Review of Systems   Constitutional: Negative  HENT: Negative  Eyes: Negative  Respiratory: Negative  Negative for shortness of breath  Cardiovascular: Negative  Negative for chest pain  Gastrointestinal: Negative  Endocrine: Negative  Genitourinary: Negative  Musculoskeletal: Positive for arthralgias (hip pain) and gait problem  Skin: Negative  "Negative for wound  Allergic/Immunologic: Negative  Hematological: Negative  Psychiatric/Behavioral: Negative  Objective:      /78 (BP Location: Right arm, Patient Position: Sitting, Cuff Size: Adult)   Pulse 58   Ht 5' 7\" (1 702 m)   Wt 81 8 kg (180 lb 6 4 oz)   BMI 28 25 kg/m²          Physical Exam  Cardiovascular:      Pulses:           Radial pulses are 2+ on the right side and 2+ on the left side  Dorsalis pedis pulses are 0 on the right side and 0 on the left side  Posterior tibial pulses are 0 on the right side and 0 on the left side  Heart sounds: No murmur heard  Pulmonary:      Effort: No respiratory distress  Breath sounds: No wheezing or rales  Musculoskeletal:      Right lower leg: No edema  Left lower leg: No edema  I have reviewed and made appropriate changes to the review of systems input by the medical assistant      Vitals:    05/17/23 1545   BP: 100/78   BP Location: Right arm   Patient Position: Sitting   Cuff Size: Adult   Pulse: 58   Weight: 81 8 kg (180 lb 6 4 oz)   Height: 5' 7\" (1 702 m)       Patient Active Problem List   Diagnosis   • Arteriosclerotic cardiovascular disease   • Backache   • Benign essential hypertension   • DMII (diabetes mellitus, type 2) (HCC)   • Hyperlipidemia   • Wright's esophagus with esophagitis   • Acute kidney injury (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)   • Liver function study, abnormal   • Elevated troponin   • " Forearm mass, left   • Fungal dermatitis   • Anxiety and depression   • Overweight   • Ambulatory dysfunction   • Frequent falls   • Right sided Pelvic abscess in male [facility])   • Severe protein-calorie malnutrition (HCC)   • Metabolic acidosis   • Hypomagnesemia   • RBBB   • Benign hypertension with chronic kidney disease, stage IV (HCC)   • Persistent proteinuria   • Anemia   • Chronic kidney disease-mineral and bone disorder   • Visual hallucinations   • Functional diarrhea   • Platelets decreased (HCC)   • Urethral tumor   • Secondary hyperparathyroidism of renal origin ([address] )   • Stage 4 chronic kidney disease (HCC)   • Anemia due to stage 4 chronic kidney disease (HCC)   • Shortness of breath   • Chemotherapy-induced thrombocytopenia   • Rib pain on left side   • Left inguinal pain   • Chronic diastolic CHF (congestive heart failure) (HCC)   • Pelvic lymphadenopathy       Past Surgical History:   Procedure Laterality Date   • BLADDER SURGERY     • COLONOSCOPY     • CORONARY ARTERY BYPASS GRAFT  07/16/2014    ABG x 4 LIMA to LAD,SVG to diagnoal 2 SVG to OM-1, SVG to PDA, resection of partial plerual mass   • CT GUIDED PERC DRAINAGE CATHETER PLACEMENT  02/19/2021   • CYSTOSCOPY  2013   • CYSTOSCOPY  02/08/2022    [facility]   • ESOPHAGOGASTRODUODENOSCOPY     • FL RETROGRADE PYELOGRAM  12/20/2018   • FL RETROGRADE PYELOGRAM  12/05/2019   • INGUINAL HERNIA REPAIR Bilateral 2015   • IR DRAINAGE TUBE CHECK AND/OR REMOVAL  03/05/2021   • PHOTODYNAMIC THERAPY      For Barretts esophagus   • WI COLONOSCOPY FLX DX W/COLLJ SPEC WHEN PFRMD N/A 04/25/2016    Procedure: COLONOSCOPY;  Surgeon: Chikis Chopra MD;  Location: [facility];   Service: Gastroenterology   • WI CYSTO W/REMOVAL OF LESIONS SMALL N/A 12/05/2019    Procedure: CYSTO W/TURBT AND TRANSURETHRAL PROSTATE BIOPSY AND OPENING OF BLADDER NECK CONTRACTURE, B/L Retrograde pyelogram;  Surgeon: Kavitha Ahn MD;  [facility];  Service: Urology   • WI CYSTOURETHROSCOPY W/DEST &/RMVL MED BLADDER TY N/A 2020    Procedure: CYSTOSCOPY, TRANSURETHRAL RESECTION OF BLADDER TUMOR (TURBT);   Surgeon: Merle Francis MD;  [facility];  Service: Urology   • IN CYSTOURETHROSCOPY WITH BIOPSY N/A 09/10/2020    Procedure: CYSTOSCOPY, COLLECTION OF LEFT KIDNEY CYTOLOGY, BILATERAL RETROGRADE PYELOGRAM, BLADDER WALL BIOPSIES  AND FULGERAION, RANDOM BLADDER TUMOR BIOPSIES;  Surgeon: Merle Francis MD;  Location: [address] [place] [address] MAIN OR;  Service: Urology   • IN CYSTOURETHROSCOPY WITH BIOPSY N/A 2022    Procedure: urethroscopy , biopsy of urethra with fulgeration;  Surgeon: Yeimy Castro MD;  Location:  MAIN OR;  Service: Urology   • PROSTATE SURGERY     • TONSILLECTOMY     • TRANSURETHRAL RESECTION OF PROSTATE N/A 2018    Procedure: CYSTO, PHOTO SELECTIVE VAPORIZATION OF PROSTATE, B/L RETROGRADE PYELOGRAM, TURBT;  Surgeon: Merle Francis MD;  [facility];  Service: Urology   • UMBILICAL HERNIA REPAIR     • UPPER GASTROINTESTINAL ENDOSCOPY         Family History   Problem Relation Age of Onset   • Cancer Mother         Topical oral abrasian caused cancer   • Other Mother         Digestive System Complications   • Diabetes Father    • Heart disease Father    • Hypertension Father    • Coronary artery disease Father    • Hyperlipidemia Father        Social History     Socioeconomic History   • Marital status: /Civil Union     Spouse name: Not on file   • Number of children: Not on file   • Years of education: Not on file   • Highest education level: Not on file   Occupational History   • Occupation: Sales position   Tobacco Use   • Smoking status: Former     Packs/day:      Years: 10 00     Pack years: 10      Types: Cigarettes     Quit date: 1972     Years since quittin 4   • Smokeless tobacco: Never   • Tobacco comments:     Quit at age 28   Vaping Use   • Vaping Use: Never used   Substance and Sexual Activity   • Alcohol use: Not Currently     Alcohol/week: 2 0 standard drinks     Types: 1 Glasses of wine, 1 Cans of beer per week     Comment: Non since 7/15/2020   • Drug use: Never   • Sexual activity: Not Currently     Partners: Female   Other Topics Concern   • Not on file   Social History Narrative    Always uses seatbelt        Caffeine use- Drinks 2 cups of coffee daily     Social Determinants of Health     Financial Resource Strain: Low Risk    • Difficulty of Paying Living Expenses: Not very hard   Food Insecurity: Not on file   Transportation Needs: No Transportation Needs   • Lack of Transportation (Medical): No   • Lack of Transportation (Non-Medical): No   Physical Activity: Not on file   Stress: Not on file   Social Connections: Not on file   Intimate Partner Violence: Not on file   Housing Stability: Not on file       Allergies   Allergen Reactions   • Fentanyl Hallucinations   • Morphine And Related Other (See Comments)     While having an MI patient received morphine and had adverse reaction but doesn't know what happened           Current Outpatient Medications:   •  atorvastatin (LIPITOR) 40 mg tablet, TAKE ONE TABLET BY MOUTH AT BEDTIME, Disp: 90 tablet, Rfl: 3  •  Blood Glucose Monitoring Suppl (OneTouch Verio Flex System) w/Device KIT, Use to check blood sugars daily, Disp: 1 kit, Rfl: 0  •  calcitriol (ROCALTROL) 0 25 mcg capsule, Take 1 tablet 2 times a week (Monday, Friday), Disp: 24 capsule, Rfl: 3  •  Cholecalciferol (VITAMIN D) 50 MCG (2000 UT) tablet, Take 2,000 Units by mouth daily, Disp: , Rfl:   •  citalopram (CeleXA) 20 mg tablet, TAKE ONE TABLET BY MOUTH EVERY DAY, Disp: 30 tablet, Rfl: 3  •  Farxiga 5 MG TABS, TAKE ONE TABLET BY MOUTH EVERY DAY, Disp: 90 tablet, Rfl: 2  •  ferrous sulfate 324 (65 Fe) mg, Take 1 tablet (324 mg total) by mouth daily, Disp: 30 tablet, Rfl: 5  •  Lancets (OneTouch Delica Plus [id number]) MISC, TEST BLOOD GLUCOSE ONCE DAILY, Disp: 100 each, Rfl: 0  •  losartan (COZAAR) 25 mg tablet, Take 25 mg by mouth daily, Disp: , Rfl:   •  magnesium oxide (MAG-OX) 400 mg, Take 1 tablet (400 mg total) by mouth in the morning , Disp: 180 tablet, Rfl: 2  •  metoprolol tartrate (LOPRESSOR) 25 mg tablet, Take 1 tablet (25 mg total) by mouth every 12 (twelve) hours, Disp: 180 tablet, Rfl: 3  •  Multiple Vitamins-Minerals (OCUVITE-LUTEIN PO), Take 1 tablet by mouth 2 (two) times a day Pt is taking preservision , Disp: , Rfl:   •  Omega-3 Fatty Acids (Fish Oil) 1200 MG CPDR, Take by mouth in the morning, Disp: , Rfl:   •  ondansetron (ZOFRAN) 4 mg tablet, Take 1 tablet (4 mg total) by mouth every 8 (eight) hours as needed for nausea or vomiting, Disp: 20 tablet, Rfl: 1  •  OneTouch Verio test strip, TEST ONCE A DAY, Disp: 100 strip, Rfl: 0  •  sodium bicarbonate 650 mg tablet, Take 2 tablets (1,300 mg total) by mouth 2 (two) times a day, Disp: 360 tablet, Rfl: 3  •  torsemide (DEMADEX) 20 mg tablet, Take 1 tablet (20 mg total) by mouth daily, Disp: 90 tablet, Rfl: 3  •  traMADol (ULTRAM) 50 mg tablet, TAKE ONE TABLET BY MOUTH EVERY 6 HOURS AS NEEDED FOR MODERATE PAIN, Disp: 20 tablet, Rfl: 0  •  omeprazole (PriLOSEC) 40 MG capsule, Take 1 capsule (40 mg total) by mouth in the morning , Disp: 90 capsule, Rfl: 3

## 2023-05-16 PROBLEM — R59.0 PELVIC LYMPHADENOPATHY: Status: ACTIVE | Noted: 2023-05-16

## 2023-05-17 ENCOUNTER — CONSULT (OUTPATIENT)
Dept: VASCULAR SURGERY | Facility: CLINIC | Age: 83
End: 2023-05-17

## 2023-05-17 VITALS
HEIGHT: 67 IN | BODY MASS INDEX: 28.31 KG/M2 | WEIGHT: 180.4 LBS | HEART RATE: 58 BPM | DIASTOLIC BLOOD PRESSURE: 78 MMHG | SYSTOLIC BLOOD PRESSURE: 100 MMHG

## 2023-05-17 DIAGNOSIS — I25.10 CORONARY ARTERY DISEASE INVOLVING NATIVE CORONARY ARTERY OF NATIVE HEART WITHOUT ANGINA PECTORIS: ICD-10-CM

## 2023-05-17 DIAGNOSIS — C67.5 MALIGNANT NEOPLASM OF URINARY BLADDER NECK (HCC): ICD-10-CM

## 2023-05-17 DIAGNOSIS — D49.59 URETHRAL TUMOR: ICD-10-CM

## 2023-05-17 DIAGNOSIS — I25.5 ISCHEMIC CARDIOMYOPATHY: ICD-10-CM

## 2023-05-17 DIAGNOSIS — I50.32 CHRONIC DIASTOLIC CHF (CONGESTIVE HEART FAILURE) (HCC): ICD-10-CM

## 2023-05-17 DIAGNOSIS — N18.4 STAGE 4 CHRONIC KIDNEY DISEASE (HCC): ICD-10-CM

## 2023-05-17 DIAGNOSIS — Z95.1 HX OF CABG: ICD-10-CM

## 2023-05-17 DIAGNOSIS — D49.4 BLADDER TUMOR: Primary | ICD-10-CM

## 2023-05-17 NOTE — PATIENT INSTRUCTIONS
"1) dialysis access  -we discussed two kinds of fistulas today; the standard surgical fistula and the newer \"magnet\" fistula called \"endoAVF\"  -we are tentatively planning the newer kind of fistula  -we are going to plan to do this once you have completed your immunotherapy  -I will see you again in August to plan the procedure  "

## 2023-05-18 RX ORDER — SODIUM CHLORIDE 9 MG/ML
20 INJECTION, SOLUTION INTRAVENOUS ONCE
Status: CANCELLED | OUTPATIENT
Start: 2023-05-25

## 2023-05-18 RX ORDER — ACETAMINOPHEN 325 MG/1
650 TABLET ORAL ONCE
Status: CANCELLED | OUTPATIENT
Start: 2023-05-25

## 2023-05-19 DIAGNOSIS — E11.59 TYPE 2 DIABETES MELLITUS WITH OTHER CIRCULATORY COMPLICATION, WITHOUT LONG-TERM CURRENT USE OF INSULIN (HCC): ICD-10-CM

## 2023-05-19 RX ORDER — LANCETS 33 GAUGE
EACH MISCELLANEOUS
Qty: 100 EACH | Refills: 0 | Status: SHIPPED | OUTPATIENT
Start: 2023-05-19

## 2023-05-19 RX ORDER — BLOOD SUGAR DIAGNOSTIC
STRIP MISCELLANEOUS
Qty: 100 STRIP | Refills: 0 | Status: SHIPPED | OUTPATIENT
Start: 2023-05-19

## 2023-05-22 ENCOUNTER — EVALUATION (OUTPATIENT)
Dept: PHYSICAL THERAPY | Facility: REHABILITATION | Age: 83
End: 2023-05-22

## 2023-05-22 DIAGNOSIS — M25.552 LEFT HIP PAIN: Primary | ICD-10-CM

## 2023-05-22 NOTE — PROGRESS NOTES
PT Evaluation     Today's date: 2023  Patient name: Saw Layne  : 1940  MRN: [id number]  Referring provider: Kike Farrell  Dx:   Encounter Diagnosis     ICD-10-CM    1  Left hip pain  M25 552           Start Time: 5  Stop Time: 1530  Total time in clinic (min): 45 minutes    Assessment  Assessment details:   Problem List:  1) Acute Left Hip Pain    Saw Layne is a pleasant 80 y o  male who presents with acute left hip pain s/p fall approximately 2 months ago  Tristian Olvera demonstrates symptoms of acute left hip pain with mobility deficits resulting in fear of not being able to keep active and future ill health (and wanting to prevent it)  No further referral appears necessary at this time based upon examination results  I expect he will improve in 4-6 weeks  Positive prognostic indicators include positive attitude toward recovery, good understanding of diagnosis and treatment plan options, acuity of symptoms and absence of peripheralization  Negative prognostic indicators include anxiety and hypertension  Impairments: abnormal gait, abnormal muscle tone, abnormal or restricted ROM, impaired physical strength, lacks appropriate home exercise program, pain with function and weight-bearing intolerance    Symptom irritability: moderateUnderstanding of Dx/Px/POC: good   Prognosis: good    Goals  Short Term Goals (Week 4):  1  Decreased pain by 50%  2  Improve ROM by 10 degrees   3  Improve strength by 1/2 measure      Long Term Goals (8 weeks):  1  Patient will reprot GROC >75%  2  Patient will exceed FOTO predicted outcome score  3  Patient will be fully independent with HEP by discharge  4  Patient will be able to manage symptoms independently         Plan  Patient would benefit from: skilled physical therapy  Planned therapy interventions: joint mobilization, manual therapy, massage, activity modification, behavior modification, neuromuscular re-education, patient education, stretching, strengthening, therapeutic activities, therapeutic exercise, functional ROM exercises, flexibility, graded activity and home exercise program  Frequency: 2x week  Duration in visits: 12  Duration in weeks: 6  Treatment plan discussed with: patient        Subjective Evaluation    History of Present Illness  Mechanism of injury: Patient presents to PT s/p fall onto his left side in late march/early April  Patient reports he had imaging of the hip which came back negative  Patient reports his pain is localized to left groin and buttock  Patient reports his pain is constant if not taking medications  Patient denies any numbness/tingling and/or weakness since the fall  Patient reports his pain is aggravated with prolonged sitting or standing, walking, light/heavy households  Patient reports his symptoms are improved with tramadol and tylenol every 6hrs  Patient denies any prevous history in the past or surgeries  Patient reports he is able to sleep through the night when laying on his back  Pain  Current pain ratin  At worst pain ratin  Progression: improved    Patient Goals  Patient goals for therapy: decreased pain, increased motion, increased strength, independence with ADLs/IADLs and return to sport/leisure activities  Patient goal: garden, go walking for 15 minutes with his dog, return to woodworking standing up        Objective  Myotomes  Right Hip Flexion (L1-3): (4+/5)  Left Hip Flexion (L1-3): (4+/5)  R Knee Ext (L3-4): (4+/5)  L Knee Ext (L3-4): (4+/5)  R DF (L4): (4+/5)  L DF (L4): (4+/5)  R EDL (L5): (4+/5)  L EDL (L5): (4+/5)  R PF (S1): (4+/5)  L PF (S1): (4+/5)  R Knee Flex (S1-2): (4+/5)  L Knee Flex (S1-2): (4+/5)      Dermatomes  Sensation intact bilaterally        Neurodynamic Testing  Slump Test: negative, tightess  SLR: negative, tightness  Femoral Nerve Traction Test: negative, postive Ely's Test    Lumbar Active Range of Motion  Movement Loss Biju Mod Min Nil Symptoms Flexion  x      Extension  x      R Side Glide  x      L Side Glide  x      Other          Maile Assessment  Rep FIS: produced buttock, worse  Rep EIS: produce buttock/lateral thigh, worse    Hip Special Tests:  FADIRS= (), FABERS= (tight), Scours= (negative), Distraction= (NT)    SIJ Special Tests:  Compression= (negative), Gapping= (negative), FABERS= (tight), Gaenslans= (negative), Sacral Thrust/PA Pressure= (postiive), Post Thigh Thrust= (negative)           Precautions: standard, hx of cancer, CHF      Manuals 5/22                                                                Neuro Re-Ed                                                                                                        Ther Ex             Pt Edu, HEP, POC 10'            DK HEP                                                                                          Ther Activity                                       Gait Training                                       Modalities

## 2023-05-23 ENCOUNTER — APPOINTMENT (OUTPATIENT)
Dept: LAB | Facility: CLINIC | Age: 83
End: 2023-05-23

## 2023-05-23 DIAGNOSIS — R82.89 POSITIVE URINARY CYTOLOGY: ICD-10-CM

## 2023-05-23 DIAGNOSIS — C67.5 MALIGNANT NEOPLASM OF URINARY BLADDER NECK (HCC): ICD-10-CM

## 2023-05-23 DIAGNOSIS — D49.4 BLADDER TUMOR: ICD-10-CM

## 2023-05-23 DIAGNOSIS — C67.8 MALIGNANT NEOPLASM OF OVERLAPPING SITES OF BLADDER (HCC): ICD-10-CM

## 2023-05-23 DIAGNOSIS — N18.4 ANEMIA DUE TO STAGE 4 CHRONIC KIDNEY DISEASE (HCC): ICD-10-CM

## 2023-05-23 DIAGNOSIS — D63.1 ANEMIA DUE TO STAGE 4 CHRONIC KIDNEY DISEASE (HCC): ICD-10-CM

## 2023-05-23 LAB
ALBUMIN SERPL BCP-MCNC: 3.6 G/DL (ref 3.5–5)
ALP SERPL-CCNC: 98 U/L (ref 46–116)
ALT SERPL W P-5'-P-CCNC: 14 U/L (ref 12–78)
ANION GAP SERPL CALCULATED.3IONS-SCNC: 3 MMOL/L (ref 4–13)
AST SERPL W P-5'-P-CCNC: 13 U/L (ref 5–45)
BASOPHILS # BLD AUTO: 0.05 THOUSANDS/ÂΜL (ref 0–0.1)
BASOPHILS NFR BLD AUTO: 1 % (ref 0–1)
BILIRUB SERPL-MCNC: 0.46 MG/DL (ref 0.2–1)
BUN SERPL-MCNC: 48 MG/DL (ref 5–25)
CALCIUM SERPL-MCNC: 9.5 MG/DL (ref 8.3–10.1)
CHLORIDE SERPL-SCNC: 104 MMOL/L (ref 96–108)
CO2 SERPL-SCNC: 27 MMOL/L (ref 21–32)
CREAT SERPL-MCNC: 2.96 MG/DL (ref 0.6–1.3)
EOSINOPHIL # BLD AUTO: 0.24 THOUSAND/ÂΜL (ref 0–0.61)
EOSINOPHIL NFR BLD AUTO: 2 % (ref 0–6)
ERYTHROCYTE [DISTWIDTH] IN BLOOD BY AUTOMATED COUNT: 14.7 % (ref 11.6–15.1)
GFR SERPL CREATININE-BSD FRML MDRD: 18 ML/MIN/1.73SQ M
GLUCOSE P FAST SERPL-MCNC: 143 MG/DL (ref 65–99)
HCT VFR BLD AUTO: 40 % (ref 36.5–49.3)
HGB BLD-MCNC: 12.9 G/DL (ref 12–17)
IMM GRANULOCYTES # BLD AUTO: 0.06 THOUSAND/UL (ref 0–0.2)
IMM GRANULOCYTES NFR BLD AUTO: 1 % (ref 0–2)
LYMPHOCYTES # BLD AUTO: 1.32 THOUSANDS/ÂΜL (ref 0.6–4.47)
LYMPHOCYTES NFR BLD AUTO: 13 % (ref 14–44)
MCH RBC QN AUTO: 31.5 PG (ref 26.8–34.3)
MCHC RBC AUTO-ENTMCNC: 32.3 G/DL (ref 31.4–37.4)
MCV RBC AUTO: 98 FL (ref 82–98)
MONOCYTES # BLD AUTO: 0.92 THOUSAND/ÂΜL (ref 0.17–1.22)
MONOCYTES NFR BLD AUTO: 9 % (ref 4–12)
NEUTROPHILS # BLD AUTO: 7.55 THOUSANDS/ÂΜL (ref 1.85–7.62)
NEUTS SEG NFR BLD AUTO: 74 % (ref 43–75)
NRBC BLD AUTO-RTO: 0 /100 WBCS
PLATELET # BLD AUTO: 172 THOUSANDS/UL (ref 149–390)
PMV BLD AUTO: 12.5 FL (ref 8.9–12.7)
POTASSIUM SERPL-SCNC: 4.4 MMOL/L (ref 3.5–5.3)
PROT SERPL-MCNC: 7.9 G/DL (ref 6.4–8.4)
RBC # BLD AUTO: 4.1 MILLION/UL (ref 3.88–5.62)
SODIUM SERPL-SCNC: 134 MMOL/L (ref 135–147)
T3FREE SERPL-MCNC: 3.02 PG/ML (ref 2.5–3.9)
TSH SERPL DL<=0.05 MIU/L-ACNC: 2.25 UIU/ML (ref 0.45–4.5)
WBC # BLD AUTO: 10.14 THOUSAND/UL (ref 4.31–10.16)

## 2023-05-24 ENCOUNTER — OFFICE VISIT (OUTPATIENT)
Dept: PHYSICAL THERAPY | Facility: REHABILITATION | Age: 83
End: 2023-05-24

## 2023-05-24 DIAGNOSIS — M25.552 LEFT HIP PAIN: Primary | ICD-10-CM

## 2023-05-24 NOTE — PROGRESS NOTES
Daily Note     Today's date: 2023  Patient name: Corinne Dillon  : 1940  MRN: [id number]  Referring provider: Velma Romero*  Dx:   Encounter Diagnosis     ICD-10-CM    1  Left hip pain  M25 552           Start Time: 1145  Stop Time: 1225  Total time in clinic (min): 40 minutes    Subjective: Patient reports compliance with HEP  States no change in symptoms with HEP  Objective: See treatment diary below  Lumbar AROM  Flexion: mod restriction  Extension: mod restriction, L buttock pain  R SG: mod restriction  L SG: mod restrction    Assessment: Mixed responses to repeated motion testing today, unable to determine a DP  Patient responded well to hip ER/IR stretching with STM  Will re-assess symptoms next visit  Patient would benefit from continued PT      Plan: Continue per plan of care        Precautions: standard, hx of cancer, CHF      Manuals            Prone Hip ER/IR stretching with STM to glute  PRR                                     Re-Assessment  20'           Neuro Re-Ed                                                                                                        Ther Ex             Pt Edu, HEP, POC 10'            DKTC HEP reviewed           Repeated Motion Tesitng  20'                                                                            Ther Activity                                       Gait Training                                       Modalities 37

## 2023-05-25 ENCOUNTER — HOSPITAL ENCOUNTER (OUTPATIENT)
Dept: INFUSION CENTER | Facility: HOSPITAL | Age: 83
Discharge: HOME | End: 2023-05-25
Attending: INTERNAL MEDICINE

## 2023-05-25 VITALS — DIASTOLIC BLOOD PRESSURE: 80 MMHG | RESPIRATION RATE: 18 BRPM | SYSTOLIC BLOOD PRESSURE: 150 MMHG | TEMPERATURE: 96.7 F

## 2023-05-25 DIAGNOSIS — C67.5 MALIGNANT NEOPLASM OF URINARY BLADDER NECK (HCC): Primary | ICD-10-CM

## 2023-05-25 DIAGNOSIS — N18.4 ANEMIA DUE TO STAGE 4 CHRONIC KIDNEY DISEASE (HCC): ICD-10-CM

## 2023-05-25 DIAGNOSIS — D63.1 ANEMIA DUE TO STAGE 4 CHRONIC KIDNEY DISEASE (HCC): ICD-10-CM

## 2023-05-25 DIAGNOSIS — D49.4 BLADDER TUMOR: ICD-10-CM

## 2023-05-25 DIAGNOSIS — R82.89 POSITIVE URINARY CYTOLOGY: ICD-10-CM

## 2023-05-25 DIAGNOSIS — C67.8 MALIGNANT NEOPLASM OF OVERLAPPING SITES OF BLADDER (HCC): ICD-10-CM

## 2023-05-25 RX ORDER — ACETAMINOPHEN 325 MG/1
650 TABLET ORAL ONCE
Status: COMPLETED | OUTPATIENT
Start: 2023-05-25 | End: 2023-05-25

## 2023-05-25 RX ORDER — SODIUM CHLORIDE 9 MG/ML
20 INJECTION, SOLUTION INTRAVENOUS ONCE
Status: COMPLETED | OUTPATIENT
Start: 2023-05-25 | End: 2023-05-25

## 2023-05-25 RX ADMIN — SODIUM CHLORIDE 20 ML/HR: 0.9 INJECTION, SOLUTION INTRAVENOUS at 13:28

## 2023-05-25 RX ADMIN — DIPHENHYDRAMINE HYDROCHLORIDE 25 MG: 50 INJECTION, SOLUTION INTRAMUSCULAR; INTRAVENOUS at 13:27

## 2023-05-25 RX ADMIN — AVELUMAB 800 MG: 20 INJECTION, SOLUTION, CONCENTRATE INTRAVENOUS at 14:28

## 2023-05-25 RX ADMIN — ACETAMINOPHEN 650 MG: 325 TABLET ORAL at 13:28

## 2023-05-27 DIAGNOSIS — C67.8 MALIGNANT NEOPLASM OF OVERLAPPING SITES OF BLADDER (HCC): ICD-10-CM

## 2023-05-29 RX ORDER — TRAMADOL HYDROCHLORIDE 50 MG/1
TABLET ORAL
Qty: 20 TABLET | Refills: 0 | Status: SHIPPED | OUTPATIENT
Start: 2023-05-29

## 2023-05-30 ENCOUNTER — OFFICE VISIT (OUTPATIENT)
Dept: PHYSICAL THERAPY | Facility: REHABILITATION | Age: 83
End: 2023-05-30

## 2023-05-30 DIAGNOSIS — M25.552 LEFT HIP PAIN: Primary | ICD-10-CM

## 2023-05-30 NOTE — PROGRESS NOTES
"Daily Note     Today's date: 2023  Patient name: Laure Dandy  : 1940  MRN: [id number]  Referring provider: Wendy Carter*  Dx:   Encounter Diagnosis     ICD-10-CM    1  Left hip pain  M25 552           Start Time: 1530  Stop Time: 1610  Total time in clinic (min): 40 minutes    Subjective: Patient reports he was sore after last visit  States today he is not experiencing symptoms in his hip  Objective: See treatment diary below    Repeated Motion Testing  ARGELIA: increased, worse      Assessment: Patient had good response to manuals today  ARGELIA increased symptoms, hold for the future  Will re-assess next visit given high soreness levels following last session  Patient would benefit from continued PT      Plan: Continue per plan of care        Precautions: standard, hx of cancer, CHF      Manuals           Prone Hip ER/IR stretching with STM to glute  PRR           LAD   PRR Gr3-4 3rds                       Re-Assessment  20' 15'          Neuro Re-Ed                                                                                                        Ther Ex             Pt Edu, HEP, POC 10'            DKTC HEP reviewed           Repeated Motion Tesitng  20'           Hip Abd Stretch   PRR          HERMELINDA Stretch w/ OP   PRR          Bridges   2x10 yhb          Supine Clamshell   2x10 5\" hold yhb                       Ther Activity                                       Gait Training                                       Modalities                                            "

## 2023-06-01 RX ORDER — ACETAMINOPHEN 325 MG/1
650 TABLET ORAL ONCE
Status: CANCELLED | OUTPATIENT
Start: 2023-06-08

## 2023-06-01 RX ORDER — SODIUM CHLORIDE 9 MG/ML
20 INJECTION, SOLUTION INTRAVENOUS ONCE
Status: CANCELLED | OUTPATIENT
Start: 2023-06-08

## 2023-06-05 ENCOUNTER — OFFICE VISIT (OUTPATIENT)
Dept: ENDOCRINOLOGY | Facility: CLINIC | Age: 83
End: 2023-06-05
Payer: MEDICARE

## 2023-06-05 VITALS
HEART RATE: 72 BPM | OXYGEN SATURATION: 97 % | SYSTOLIC BLOOD PRESSURE: 156 MMHG | WEIGHT: 188.2 LBS | BODY MASS INDEX: 29.54 KG/M2 | HEIGHT: 67 IN | DIASTOLIC BLOOD PRESSURE: 80 MMHG

## 2023-06-05 DIAGNOSIS — C67.8 MALIGNANT NEOPLASM OF OVERLAPPING SITES OF BLADDER (HCC): ICD-10-CM

## 2023-06-05 DIAGNOSIS — N18.4 TYPE 2 DIABETES MELLITUS WITH STAGE 4 CHRONIC KIDNEY DISEASE, WITHOUT LONG-TERM CURRENT USE OF INSULIN (HCC): ICD-10-CM

## 2023-06-05 DIAGNOSIS — E11.22 TYPE 2 DIABETES MELLITUS WITH STAGE 4 CHRONIC KIDNEY DISEASE, WITHOUT LONG-TERM CURRENT USE OF INSULIN (HCC): ICD-10-CM

## 2023-06-05 DIAGNOSIS — E78.2 MIXED HYPERLIPIDEMIA: ICD-10-CM

## 2023-06-05 DIAGNOSIS — E11.3293 TYPE 2 DIABETES MELLITUS WITH BOTH EYES AFFECTED BY MILD NONPROLIFERATIVE RETINOPATHY WITHOUT MACULAR EDEMA, WITHOUT LONG-TERM CURRENT USE OF INSULIN (HCC): Primary | ICD-10-CM

## 2023-06-05 DIAGNOSIS — E11.59 TYPE 2 DIABETES MELLITUS WITH OTHER CIRCULATORY COMPLICATION, WITHOUT LONG-TERM CURRENT USE OF INSULIN (HCC): ICD-10-CM

## 2023-06-05 LAB — SL AMB POCT HEMOGLOBIN AIC: 6.3 (ref ?–6.5)

## 2023-06-05 PROCEDURE — 99213 OFFICE O/P EST LOW 20 MIN: CPT | Performed by: INTERNAL MEDICINE

## 2023-06-05 PROCEDURE — 83036 HEMOGLOBIN GLYCOSYLATED A1C: CPT | Performed by: INTERNAL MEDICINE

## 2023-06-05 NOTE — PROGRESS NOTES
Radha Buckley 80 y o  male MRN: [id number]    Encounter: [id number]      Assessment/Plan     Assessment: This is a 80y o -year-old male with diabetes without hyperglycemia complicated by retinopathy, coronary artery disease, stage IV chronic kidney disease  He also has bladder cancer, hyperlipidemia  Plan:  1  Type 2 diabetes is well controlled based on hemoglobin A1c  Therefore, I will plan to see him as needed  He will follow-up with his primary care physician for his diabetes care  2   Diabetes related retinopathy is stable  3   Stage IV chronic kidney disease-he follows with nephrology and has seen vascular surgery for possible access  4   Bladder cancer-he follows with oncology and is on treatment  5   Hyperlipidemia-continue statin  CC: Diabetes    History of Present Illness     HPI:  80-year-old man with type 2 diabetes presents for follow-up  He is currently on Farxiga  His blood sugars are under good control  He denies any hypoglycemia  His diabetes is complicated by stage IV chronic kidney disease, coronary artery disease and retinopathy  Review of Systems   Constitutional: Negative for chills and fever  Respiratory: Negative for shortness of breath  Cardiovascular: Negative for chest pain  Gastrointestinal: Negative for constipation, diarrhea, nausea and vomiting  Endocrine: Negative for polydipsia and polyuria  All other systems reviewed and are negative        Historical Information   Past Medical History:   Diagnosis Date   • Acute kidney injury ([facility] )    • Anemia Jan 2022   • Arthritis     Hands   • Wright esophagus    • BPH with obstruction/lower urinary tract symptoms    • CAD (coronary artery disease)     Last assessed 09/16/15   • Cancer [facility])     bladder   • Cataract, acquired     Last assessed 10/10/17   • Chronic kidney disease    • Coronary artery disease    • Diabetes mellitus ([facility] )     NIDDM   • Diabetic neuropathy ([facility] )     Feet   • Enlarged prostate with lower urinary tract symptoms (LUTS)     Last assessed 10/10/17   • Erectile dysfunction    • GERD (gastroesophageal reflux disease)     Last assessed 10/10/17   • Hemoptysis     Last assessed 03/14/16   • Hypercholesterolemia     Last assessed 10/10/17   • Hypertension     Last assessed 10/10/17   • Kidney stone    • Macular degeneration     Right eye is particularly affected-peripheral vision intact   • Myocardial infarction [facility]) 1998   • OAB (overactive bladder)    • Testicular hypofunction    • Testicular hypogonadism     Last assessed 10/10/17   • Tinnitus    • Umbilical hernia     Last assessed 06/18/14   • Urge incontinence of urine    • Wears glasses      Past Surgical History:   Procedure Laterality Date   • BLADDER SURGERY     • COLONOSCOPY     • CORONARY ARTERY BYPASS GRAFT  07/16/2014    ABG x 4 LIMA to LAD,SVG to diagnoal 2 SVG to OM-1, SVG to PDA, resection of partial plerual mass   • CT GUIDED PERC DRAINAGE CATHETER PLACEMENT  02/19/2021   • CYSTOSCOPY  2013   • CYSTOSCOPY  02/08/2022    [place]   • ESOPHAGOGASTRODUODENOSCOPY     • FL RETROGRADE PYELOGRAM  12/20/2018   • FL RETROGRADE PYELOGRAM  12/05/2019   • INGUINAL HERNIA REPAIR Bilateral 2015   • IR DRAINAGE TUBE CHECK AND/OR REMOVAL  03/05/2021   • PHOTODYNAMIC THERAPY      For Barretts esophagus   • OH COLONOSCOPY FLX DX W/COLLJ SPEC WHEN PFRMD N/A 04/25/2016    Procedure: COLONOSCOPY;  Surgeon: Gia Machado MD;  Location: [facility]; Service: Gastroenterology   • OH CYSTO W/REMOVAL OF LESIONS SMALL N/A 12/05/2019    Procedure: CYSTO W/TURBT AND TRANSURETHRAL PROSTATE BIOPSY AND OPENING OF BLADDER NECK CONTRACTURE, B/L Retrograde pyelogram;  Surgeon: Anthony Madison MD;  Location[facility];  Service: Urology   • OH CYSTOURETHROSCOPY W/DEST &/RMVL MED BLADDER TY N/A 04/16/2020    Procedure: CYSTOSCOPY, TRANSURETHRAL RESECTION OF BLADDER TUMOR (TURBT);   Surgeon: Anthony Madison MD;  [facility];  Service: Urology   • MO CYSTOURETHROSCOPY WITH BIOPSY N/A 09/10/2020    Procedure: CYSTOSCOPY, COLLECTION OF LEFT KIDNEY CYTOLOGY, BILATERAL RETROGRADE PYELOGRAM, BLADDER WALL BIOPSIES  AND Helyn Galley, RANDOM BLADDER TUMOR BIOPSIES;  Surgeon: Nighat Go MD;  Location: [address] [place] [address] MAIN OR;  Service: Urology   • MO CYSTOURETHROSCOPY WITH BIOPSY N/A 2022    Procedure: urethroscopy , biopsy of urethra with fulgeration;  Surgeon: Heidi Meneses MD;  Location:  MAIN OR;  Service: Urology   • PROSTATE SURGERY     • TONSILLECTOMY     • TRANSURETHRAL RESECTION OF PROSTATE N/A 2018    Procedure: CYSTO, PHOTO SELECTIVE VAPORIZATION OF PROSTATE, B/L RETROGRADE PYELOGRAM, TURBT;  Surgeon: Nighat Go MD;  [facility];  Service: Urology   • UMBILICAL HERNIA REPAIR     • UPPER GASTROINTESTINAL ENDOSCOPY       Social History   Social History     Substance and Sexual Activity   Alcohol Use Not Currently   • Alcohol/week: 2 0 standard drinks of alcohol   • Types: 1 Glasses of wine, 1 Cans of beer per week    Comment: Non since 7/15/2020     Social History     Substance and Sexual Activity   Drug Use Never     Social History     Tobacco Use   Smoking Status Former   • Packs/day: 1 00   • Years: 10 00   • Total pack years: 10 00   • Types: Cigarettes   • Quit date: 1972   • Years since quittin 4   Smokeless Tobacco Never   Tobacco Comments    Quit at age 28     Family History:   Family History   Problem Relation Age of Onset   • Cancer Mother         Topical oral abrasian caused cancer   • Other Mother         Digestive System Complications   • Diabetes Father    • Heart disease Father    • Hypertension Father    • Coronary artery disease Father    • Hyperlipidemia Father        Meds/Allergies   Current Outpatient Medications   Medication Sig Dispense Refill   • atorvastatin (LIPITOR) 40 mg tablet TAKE ONE TABLET BY MOUTH AT BEDTIME 90 tablet 3   • Blood Glucose Monitoring Suppl (OneTouch Verio Flex System) w/Device KIT Use to check blood sugars daily 1 kit 0   • calcitriol (ROCALTROL) 0 25 mcg capsule Take 1 tablet 2 times a week (Monday, Friday) 24 capsule 3   • Cholecalciferol (VITAMIN D) 50 MCG (2000 UT) tablet Take 2,000 Units by mouth daily     • citalopram (CeleXA) 20 mg tablet TAKE ONE TABLET BY MOUTH EVERY DAY 30 tablet 3   • Farxiga 5 MG TABS TAKE ONE TABLET BY MOUTH EVERY DAY 90 tablet 2   • ferrous sulfate 324 (65 Fe) mg Take 1 tablet (324 mg total) by mouth daily 30 tablet 5   • Lancets (OneTouch Delica Plus [id number]) MISC TEST BLOOD GLUCOSE ONCE DAILY 100 each 0   • losartan (COZAAR) 25 mg tablet Take 25 mg by mouth daily     • magnesium oxide (MAG-OX) 400 mg Take 1 tablet (400 mg total) by mouth in the morning  180 tablet 2   • metoprolol tartrate (LOPRESSOR) 25 mg tablet Take 1 tablet (25 mg total) by mouth every 12 (twelve) hours 180 tablet 3   • Multiple Vitamins-Minerals (OCUVITE-LUTEIN PO) Take 1 tablet by mouth 2 (two) times a day Pt is taking preservision      • Omega-3 Fatty Acids (Fish Oil) 1200 MG CPDR Take by mouth in the morning     • omeprazole (PriLOSEC) 40 MG capsule Take 1 capsule (40 mg total) by mouth in the morning  90 capsule 3   • ondansetron (ZOFRAN) 4 mg tablet Take 1 tablet (4 mg total) by mouth every 8 (eight) hours as needed for nausea or vomiting 20 tablet 1   • OneTouch Verio test strip TEST ONCE A  strip 0   • sodium bicarbonate 650 mg tablet Take 2 tablets (1,300 mg total) by mouth 2 (two) times a day 360 tablet 3   • torsemide (DEMADEX) 20 mg tablet Take 1 tablet (20 mg total) by mouth daily 90 tablet 3   • traMADol (ULTRAM) 50 mg tablet TAKE ONE TABLET BY MOUTH EVERY 6 HOURS AS NEEDED FOR MODERATE PAIN 20 tablet 0     No current facility-administered medications for this visit       Allergies   Allergen Reactions   • Fentanyl Hallucinations   • Morphine And Related Other (See Comments)     While having an MI patient received morphine and had adverse reaction but "doesn't know what happened  Objective   Vitals: Blood pressure 156/80, pulse 72, height 5' 7\" (1 702 m), weight 85 4 kg (188 lb 3 2 oz), SpO2 97 %  Physical Exam  Vitals reviewed  Constitutional:       General: He is not in acute distress  Appearance: He is well-developed  He is not diaphoretic  HENT:      Head: Normocephalic and atraumatic  Mouth/Throat:      Pharynx: No oropharyngeal exudate  Eyes:      General: Lids are normal  No scleral icterus  Right eye: No discharge  Left eye: No discharge  Conjunctiva/sclera: Conjunctivae normal    Neck:      Thyroid: No thyromegaly  Cardiovascular:      Rate and Rhythm: Normal rate and regular rhythm  Heart sounds: Normal heart sounds  No murmur heard  No friction rub  No gallop  Pulmonary:      Effort: Pulmonary effort is normal  No respiratory distress  Breath sounds: Normal breath sounds  No wheezing  Abdominal:      General: Bowel sounds are normal  There is no distension  Palpations: Abdomen is soft  Tenderness: There is no abdominal tenderness  Musculoskeletal:         General: No tenderness or deformity  Normal range of motion  Cervical back: Neck supple  Lymphadenopathy:      Head:      Right side of head: No occipital adenopathy  Left side of head: No occipital adenopathy  Upper Body:      Right upper body: No supraclavicular adenopathy  Left upper body: No supraclavicular adenopathy  Skin:     General: Skin is warm  Findings: No erythema or rash  Neurological:      Mental Status: He is alert and oriented to person, place, and time  Cranial Nerves: No cranial nerve deficit  Coordination: Coordination normal    Psychiatric:         Mood and Affect: Mood normal          Behavior: Behavior normal          The history was obtained from the review of the chart, patient      Lab Results:   Lab Results   Component Value Date/Time    Albumin 3 6 05/23/2023 " "12:08 PM    Albumin 4 0 03/07/2023 10:06 AM    Albumin 3 6 02/21/2023 10:58 AM    ALT 14 05/23/2023 12:08 PM    ALT 42 03/07/2023 10:06 AM    ALT 30 02/21/2023 10:58 AM    AST 13 05/23/2023 12:08 PM    AST 26 03/07/2023 10:06 AM    AST 19 02/21/2023 10:58 AM    BUN 48 (H) 05/23/2023 12:08 PM    BUN 52 (H) 03/28/2023 10:03 AM    BUN 63 (H) 03/07/2023 10:06 AM    Chloride 104 05/23/2023 12:08 PM    Chloride 107 03/28/2023 10:03 AM    Chloride 104 03/07/2023 10:06 AM    CO2 27 05/23/2023 12:08 PM    CO2 24 03/28/2023 10:03 AM    CO2 25 03/07/2023 10:06 AM    Creatinine 2 96 (H) 05/23/2023 12:08 PM    Creatinine 2 86 (H) 03/28/2023 10:03 AM    Creatinine 2 60 (H) 03/07/2023 10:06 AM    Hematocrit 40 0 05/23/2023 12:08 PM    Hematocrit 31 3 (L) 03/28/2023 10:03 AM    Hematocrit 35 1 (L) 03/07/2023 10:06 AM    HDL, Direct 61 10/25/2022 11:07 AM    Hemoglobin 12 9 05/23/2023 12:08 PM    Hemoglobin 10 1 (L) 03/28/2023 10:03 AM    Hemoglobin 11 6 (L) 03/07/2023 10:06 AM    Hemoglobin A1C 6 6 (H) 02/14/2023 10:07 AM    Hemoglobin A1C 6 5 (H) 10/25/2022 11:07 AM    Hemoglobin A1C 6 0 (H) 07/19/2022 10:34 AM    Potassium 4 4 05/23/2023 12:08 PM    Potassium 4 0 03/28/2023 10:03 AM    Potassium 4 1 03/07/2023 10:06 AM    MCV 98 05/23/2023 12:08 PM     (H) 03/28/2023 10:03 AM    MCV 96 03/07/2023 10:06 AM    Platelets 424 13/32/3526 12:08 PM    Platelets 398 97/05/1393 10:03 AM    Platelets 382 (L) 15/64/4024 10:06 AM    Total Protein 7 9 05/23/2023 12:08 PM    Total Protein 7 4 03/07/2023 10:06 AM    Total Protein 6 9 02/21/2023 10:58 AM    Triglycerides 80 10/25/2022 11:07 AM    WBC 10 14 05/23/2023 12:08 PM    WBC 8 23 03/28/2023 10:03 AM    WBC 9 07 03/07/2023 10:06 AM           Imaging Studies: I have personally reviewed pertinent reports  Portions of the record may have been created with voice recognition software   Occasional wrong word or \"sound a like\" substitutions may have occurred due to the inherent " limitations of voice recognition software  Read the chart carefully and recognize, using context, where substitutions have occurred

## 2023-06-06 ENCOUNTER — APPOINTMENT (OUTPATIENT)
Dept: LAB | Facility: CLINIC | Age: 83
End: 2023-06-06
Payer: MEDICARE

## 2023-06-06 ENCOUNTER — OFFICE VISIT (OUTPATIENT)
Dept: PHYSICAL THERAPY | Facility: REHABILITATION | Age: 83
End: 2023-06-06
Payer: MEDICARE

## 2023-06-06 DIAGNOSIS — D63.1 ANEMIA DUE TO STAGE 4 CHRONIC KIDNEY DISEASE (HCC): ICD-10-CM

## 2023-06-06 DIAGNOSIS — D49.4 BLADDER TUMOR: ICD-10-CM

## 2023-06-06 DIAGNOSIS — M25.552 LEFT HIP PAIN: Primary | ICD-10-CM

## 2023-06-06 DIAGNOSIS — C67.8 MALIGNANT NEOPLASM OF OVERLAPPING SITES OF BLADDER (HCC): ICD-10-CM

## 2023-06-06 DIAGNOSIS — R82.89 POSITIVE URINARY CYTOLOGY: ICD-10-CM

## 2023-06-06 DIAGNOSIS — N18.4 ANEMIA DUE TO STAGE 4 CHRONIC KIDNEY DISEASE (HCC): ICD-10-CM

## 2023-06-06 DIAGNOSIS — N18.4 STAGE 4 CHRONIC KIDNEY DISEASE (HCC): ICD-10-CM

## 2023-06-06 DIAGNOSIS — C67.5 MALIGNANT NEOPLASM OF URINARY BLADDER NECK (HCC): ICD-10-CM

## 2023-06-06 LAB
ALBUMIN SERPL BCP-MCNC: 3.4 G/DL (ref 3.5–5)
ALP SERPL-CCNC: 98 U/L (ref 46–116)
ALT SERPL W P-5'-P-CCNC: 19 U/L (ref 12–78)
ANION GAP SERPL CALCULATED.3IONS-SCNC: 5 MMOL/L (ref 4–13)
AST SERPL W P-5'-P-CCNC: 13 U/L (ref 5–45)
BASOPHILS # BLD AUTO: 0.03 THOUSANDS/ÂΜL (ref 0–0.1)
BASOPHILS NFR BLD AUTO: 0 % (ref 0–1)
BILIRUB SERPL-MCNC: 0.6 MG/DL (ref 0.2–1)
BUN SERPL-MCNC: 60 MG/DL (ref 5–25)
CALCIUM ALBUM COR SERPL-MCNC: 10 MG/DL (ref 8.3–10.1)
CALCIUM SERPL-MCNC: 9.5 MG/DL (ref 8.3–10.1)
CHLORIDE SERPL-SCNC: 104 MMOL/L (ref 96–108)
CO2 SERPL-SCNC: 24 MMOL/L (ref 21–32)
CREAT SERPL-MCNC: 3.28 MG/DL (ref 0.6–1.3)
EOSINOPHIL # BLD AUTO: 0.19 THOUSAND/ÂΜL (ref 0–0.61)
EOSINOPHIL NFR BLD AUTO: 2 % (ref 0–6)
ERYTHROCYTE [DISTWIDTH] IN BLOOD BY AUTOMATED COUNT: 15 % (ref 11.6–15.1)
GFR SERPL CREATININE-BSD FRML MDRD: 16 ML/MIN/1.73SQ M
GLUCOSE SERPL-MCNC: 215 MG/DL (ref 65–140)
HCT VFR BLD AUTO: 38.3 % (ref 36.5–49.3)
HGB BLD-MCNC: 12.3 G/DL (ref 12–17)
IMM GRANULOCYTES # BLD AUTO: 0.05 THOUSAND/UL (ref 0–0.2)
IMM GRANULOCYTES NFR BLD AUTO: 1 % (ref 0–2)
LYMPHOCYTES # BLD AUTO: 0.64 THOUSANDS/ÂΜL (ref 0.6–4.47)
LYMPHOCYTES NFR BLD AUTO: 6 % (ref 14–44)
MCH RBC QN AUTO: 31.7 PG (ref 26.8–34.3)
MCHC RBC AUTO-ENTMCNC: 32.1 G/DL (ref 31.4–37.4)
MCV RBC AUTO: 99 FL (ref 82–98)
MONOCYTES # BLD AUTO: 0.96 THOUSAND/ÂΜL (ref 0.17–1.22)
MONOCYTES NFR BLD AUTO: 10 % (ref 4–12)
NEUTROPHILS # BLD AUTO: 8.14 THOUSANDS/ÂΜL (ref 1.85–7.62)
NEUTS SEG NFR BLD AUTO: 81 % (ref 43–75)
NRBC BLD AUTO-RTO: 0 /100 WBCS
PLATELET # BLD AUTO: 162 THOUSANDS/UL (ref 149–390)
PMV BLD AUTO: 12.5 FL (ref 8.9–12.7)
POTASSIUM SERPL-SCNC: 5.2 MMOL/L (ref 3.5–5.3)
PROT SERPL-MCNC: 7.2 G/DL (ref 6.4–8.4)
RBC # BLD AUTO: 3.88 MILLION/UL (ref 3.88–5.62)
SODIUM SERPL-SCNC: 133 MMOL/L (ref 135–147)
T3FREE SERPL-MCNC: 2.12 PG/ML (ref 2.5–3.9)
TSH SERPL DL<=0.05 MIU/L-ACNC: 2.46 UIU/ML (ref 0.45–4.5)
WBC # BLD AUTO: 10.01 THOUSAND/UL (ref 4.31–10.16)

## 2023-06-06 PROCEDURE — 97140 MANUAL THERAPY 1/> REGIONS: CPT

## 2023-06-06 PROCEDURE — 36415 COLL VENOUS BLD VENIPUNCTURE: CPT

## 2023-06-06 PROCEDURE — 84443 ASSAY THYROID STIM HORMONE: CPT

## 2023-06-06 PROCEDURE — 84481 FREE ASSAY (FT-3): CPT

## 2023-06-06 PROCEDURE — 85025 COMPLETE CBC W/AUTO DIFF WBC: CPT

## 2023-06-06 PROCEDURE — 80053 COMPREHEN METABOLIC PANEL: CPT

## 2023-06-06 PROCEDURE — 97110 THERAPEUTIC EXERCISES: CPT

## 2023-06-06 NOTE — PROGRESS NOTES
"Daily Note     Today's date: 2023  Patient name: Una Haney  : 1940  MRN: [id number]  Referring provider: Yecenia Valladares*  Dx:   Encounter Diagnosis     ICD-10-CM    1  Left hip pain  M25 552           Start Time: 1150  Stop Time: 1243  Total time in clinic (min): 53 minutes   Patient 1 on 1 with PT from 1150-1228p  Subjective: Patient reports he is functioning well with use of pain meds and exercises  Objective: See treatment diary below       Assessment: Patient tolerated activities well today  Good responses to stretching and manuals  Progress activities slowly  Patient would benefit from continued PT      Plan: Continue per plan of care        Precautions: standard, hx of cancer, CHF      Manuals          Prone Hip ER/IR stretching with STM to glute  PRR           LAD   PRR Gr3-4 3rds PRR Gr3-4 3rds                      Re-Assessment  20' 15'          Neuro Re-Ed                                                                                                        Ther Ex             Pt Edu, HEP, POC 10'            DKTC HEP reviewed           Repeated Motion Tesitng  20'           Std Hip Abd    Yb 2x10 ea         Hip Abd Stretch   PRR PRR         HERMELINDA Stretch w/ OP   PRR PRR         Bridges   2x10 yhb 2x10 yhb         Supine Clamshell   2x10 5\" hold yhb 2x15 5\" hold yhb         VG     DL L5 5'         Ther Activity                                       Gait Training                                       Modalities                                            "

## 2023-06-07 ENCOUNTER — TELEPHONE (OUTPATIENT)
Dept: OTHER | Facility: HOSPITAL | Age: 83
End: 2023-06-07

## 2023-06-07 DIAGNOSIS — R60.0 BILATERAL LOWER EXTREMITY EDEMA: ICD-10-CM

## 2023-06-07 RX ORDER — TORSEMIDE 20 MG/1
20 TABLET ORAL EVERY OTHER DAY
Qty: 90 TABLET | Refills: 0
Start: 2023-06-07

## 2023-06-07 NOTE — TELEPHONE ENCOUNTER
Na 133   Cr up to 3 28, no edema, discussed with patient the possibility of intravascular volume depletion with daily torsemide  Plan  - decrease torsemide  20 mg EOD  - check BMP in two weeks

## 2023-06-08 ENCOUNTER — HOSPITAL ENCOUNTER (OUTPATIENT)
Dept: INFUSION CENTER | Facility: HOSPITAL | Age: 83
Discharge: HOME | End: 2023-06-08
Attending: INTERNAL MEDICINE
Payer: MEDICARE

## 2023-06-08 VITALS
HEART RATE: 76 BPM | SYSTOLIC BLOOD PRESSURE: 144 MMHG | TEMPERATURE: 97 F | RESPIRATION RATE: 20 BRPM | DIASTOLIC BLOOD PRESSURE: 62 MMHG

## 2023-06-08 DIAGNOSIS — C67.8 MALIGNANT NEOPLASM OF OVERLAPPING SITES OF BLADDER (HCC): ICD-10-CM

## 2023-06-08 DIAGNOSIS — D63.1 ANEMIA DUE TO STAGE 4 CHRONIC KIDNEY DISEASE (HCC): ICD-10-CM

## 2023-06-08 DIAGNOSIS — C67.5 MALIGNANT NEOPLASM OF URINARY BLADDER NECK (HCC): Primary | ICD-10-CM

## 2023-06-08 DIAGNOSIS — D49.4 BLADDER TUMOR: ICD-10-CM

## 2023-06-08 DIAGNOSIS — N18.4 ANEMIA DUE TO STAGE 4 CHRONIC KIDNEY DISEASE (HCC): ICD-10-CM

## 2023-06-08 DIAGNOSIS — R82.89 POSITIVE URINARY CYTOLOGY: ICD-10-CM

## 2023-06-08 PROCEDURE — 96413 CHEMO IV INFUSION 1 HR: CPT

## 2023-06-08 PROCEDURE — 96367 TX/PROPH/DG ADDL SEQ IV INF: CPT

## 2023-06-08 RX ORDER — ACETAMINOPHEN 325 MG/1
650 TABLET ORAL ONCE
Status: DISCONTINUED | OUTPATIENT
Start: 2023-06-08 | End: 2023-06-11 | Stop reason: HOSPADM

## 2023-06-08 RX ORDER — SODIUM CHLORIDE 9 MG/ML
20 INJECTION, SOLUTION INTRAVENOUS ONCE
Status: COMPLETED | OUTPATIENT
Start: 2023-06-08 | End: 2023-06-08

## 2023-06-08 RX ADMIN — DIPHENHYDRAMINE HYDROCHLORIDE 25 MG: 50 INJECTION INTRAMUSCULAR; INTRAVENOUS at 13:56

## 2023-06-08 RX ADMIN — AVELUMAB 800 MG: 20 INJECTION, SOLUTION, CONCENTRATE INTRAVENOUS at 14:20

## 2023-06-08 RX ADMIN — SODIUM CHLORIDE 20 ML/HR: 0.9 INJECTION, SOLUTION INTRAVENOUS at 13:55

## 2023-06-13 ENCOUNTER — OFFICE VISIT (OUTPATIENT)
Dept: PHYSICAL THERAPY | Facility: REHABILITATION | Age: 83
End: 2023-06-13
Payer: MEDICARE

## 2023-06-13 DIAGNOSIS — M25.552 LEFT HIP PAIN: Primary | ICD-10-CM

## 2023-06-13 PROCEDURE — 97112 NEUROMUSCULAR REEDUCATION: CPT

## 2023-06-13 PROCEDURE — 97140 MANUAL THERAPY 1/> REGIONS: CPT

## 2023-06-13 PROCEDURE — 97110 THERAPEUTIC EXERCISES: CPT

## 2023-06-13 NOTE — PROGRESS NOTES
"Daily Note     Today's date: 2023  Patient name: Ranjana Harmon  : 1940  MRN: [id number]  Referring provider: Bertin Balderas*  Dx:   Encounter Diagnosis     ICD-10-CM    1  Left hip pain  M25 552           Start Time: 1345  Stop Time: 1425  Total time in clinic (min): 40 minutes    Subjective: Patient reports his left hip/buttock symptoms are getting much better  Patient reports he is taking less pain meds for symptoms since last visit  Objective: See treatment diary below      Assessment: Tolerated all activities and progressions without issues  Muscular soreness end of session  Continue to progress as tolerated  Patient would benefit from continued PT      Plan: Continue per plan of care        Precautions: standard, hx of cancer, CHF      Manuals         Prone Hip ER/IR stretching with STM to glute  PRR           LAD   PRR Gr3-4 3rds PRR Gr3-4 3rds PRR Gr3-4 3rds                     Re-Assessment  20' 15'          Neuro Re-Ed                                                                                                        Ther Ex             Pt Edu, HEP, POC 10'            DKTC HEP reviewed           Repeated Motion Tesitng  20'           Std Hip Abd    Yb 2x10 ea Yb 2x10 ea        Hip Abd Stretch   PRR PRR PRR        HERMELINDA Stretch w/ OP   PRR PRR PRR        Bridges   2x10 yhb 2x10 yhb 2x10 rhb        Supine Clamshell   2x10 5\" hold yhb 2x15 5\" hold yhb 2x10 5\" hold rhb        VG     DL L5 5' DL L7 5'        Ther Activity                                       Gait Training                                       Modalities                                            "

## 2023-06-14 ENCOUNTER — TELEPHONE (OUTPATIENT)
Dept: NEPHROLOGY | Facility: CLINIC | Age: 83
End: 2023-06-14

## 2023-06-14 NOTE — TELEPHONE ENCOUNTER
Received call from pt stating he received a message on Monday about a sooner appointment  I advised pt that Dr Jade Shetty wanted him to get BMP in 2 weeks per last note, so pt will get labs done next week sometime  Pt asked if there are any concerns with lab results to call and review lab results  if he needs to be seen sooner we will let him know of any openings for Aug, until then he will keep his fu on 9/5/23

## 2023-06-15 ENCOUNTER — TELEPHONE (OUTPATIENT)
Dept: NEPHROLOGY | Facility: CLINIC | Age: 83
End: 2023-06-15

## 2023-06-15 ENCOUNTER — LAB (OUTPATIENT)
Dept: LAB | Facility: CLINIC | Age: 83
End: 2023-06-15
Payer: MEDICARE

## 2023-06-15 DIAGNOSIS — R80.1 PERSISTENT PROTEINURIA: ICD-10-CM

## 2023-06-15 DIAGNOSIS — E87.20 METABOLIC ACIDOSIS: ICD-10-CM

## 2023-06-15 DIAGNOSIS — F32.A ANXIETY AND DEPRESSION: ICD-10-CM

## 2023-06-15 DIAGNOSIS — N18.4 BENIGN HYPERTENSION WITH CHRONIC KIDNEY DISEASE, STAGE IV (HCC): ICD-10-CM

## 2023-06-15 DIAGNOSIS — I50.32 CHRONIC DIASTOLIC CHF (CONGESTIVE HEART FAILURE) (HCC): ICD-10-CM

## 2023-06-15 DIAGNOSIS — N25.81 SECONDARY HYPERPARATHYROIDISM OF RENAL ORIGIN (HCC): ICD-10-CM

## 2023-06-15 DIAGNOSIS — N18.4 STAGE 4 CHRONIC KIDNEY DISEASE (HCC): Primary | ICD-10-CM

## 2023-06-15 DIAGNOSIS — N18.4 ANEMIA DUE TO STAGE 4 CHRONIC KIDNEY DISEASE (HCC): ICD-10-CM

## 2023-06-15 DIAGNOSIS — E83.42 HYPOMAGNESEMIA: ICD-10-CM

## 2023-06-15 DIAGNOSIS — D63.1 ANEMIA DUE TO STAGE 4 CHRONIC KIDNEY DISEASE (HCC): ICD-10-CM

## 2023-06-15 DIAGNOSIS — F41.9 ANXIETY AND DEPRESSION: ICD-10-CM

## 2023-06-15 DIAGNOSIS — N18.4 STAGE 4 CHRONIC KIDNEY DISEASE (HCC): ICD-10-CM

## 2023-06-15 DIAGNOSIS — I12.9 BENIGN HYPERTENSION WITH CHRONIC KIDNEY DISEASE, STAGE IV (HCC): ICD-10-CM

## 2023-06-15 LAB
ANION GAP SERPL CALCULATED.3IONS-SCNC: 6 MMOL/L (ref 4–13)
BUN SERPL-MCNC: 48 MG/DL (ref 5–25)
CALCIUM SERPL-MCNC: 9.6 MG/DL (ref 8.3–10.1)
CHLORIDE SERPL-SCNC: 105 MMOL/L (ref 96–108)
CO2 SERPL-SCNC: 24 MMOL/L (ref 21–32)
CREAT SERPL-MCNC: 2.98 MG/DL (ref 0.6–1.3)
CREAT UR-MCNC: 33.9 MG/DL
CREAT UR-MCNC: 34 MG/DL
ERYTHROCYTE [DISTWIDTH] IN BLOOD BY AUTOMATED COUNT: 15 % (ref 11.6–15.1)
GFR SERPL CREATININE-BSD FRML MDRD: 18 ML/MIN/1.73SQ M
GLUCOSE P FAST SERPL-MCNC: 151 MG/DL (ref 65–99)
HCT VFR BLD AUTO: 40.9 % (ref 36.5–49.3)
HGB BLD-MCNC: 13.1 G/DL (ref 12–17)
MAGNESIUM SERPL-MCNC: 2.4 MG/DL (ref 1.6–2.6)
MCH RBC QN AUTO: 31.1 PG (ref 26.8–34.3)
MCHC RBC AUTO-ENTMCNC: 32 G/DL (ref 31.4–37.4)
MCV RBC AUTO: 97 FL (ref 82–98)
MICROALBUMIN UR-MCNC: 123 MG/L (ref 0–20)
MICROALBUMIN/CREAT 24H UR: 363 MG/G CREATININE (ref 0–30)
PHOSPHATE SERPL-MCNC: 3.8 MG/DL (ref 2.3–4.1)
PLATELET # BLD AUTO: 188 THOUSANDS/UL (ref 149–390)
PMV BLD AUTO: 11.8 FL (ref 8.9–12.7)
POTASSIUM SERPL-SCNC: 4.6 MMOL/L (ref 3.5–5.3)
PROT UR-MCNC: 36 MG/DL
PROT/CREAT UR: 1.06 MG/G{CREAT} (ref 0–0.1)
PTH-INTACT SERPL-MCNC: 67.7 PG/ML (ref 12–88)
RBC # BLD AUTO: 4.21 MILLION/UL (ref 3.88–5.62)
SODIUM SERPL-SCNC: 135 MMOL/L (ref 135–147)
WBC # BLD AUTO: 13.94 THOUSAND/UL (ref 4.31–10.16)

## 2023-06-15 PROCEDURE — 84156 ASSAY OF PROTEIN URINE: CPT

## 2023-06-15 PROCEDURE — 82043 UR ALBUMIN QUANTITATIVE: CPT

## 2023-06-15 PROCEDURE — 84100 ASSAY OF PHOSPHORUS: CPT

## 2023-06-15 PROCEDURE — 82570 ASSAY OF URINE CREATININE: CPT

## 2023-06-15 PROCEDURE — 80048 BASIC METABOLIC PNL TOTAL CA: CPT

## 2023-06-15 PROCEDURE — 85027 COMPLETE CBC AUTOMATED: CPT

## 2023-06-15 PROCEDURE — 36415 COLL VENOUS BLD VENIPUNCTURE: CPT

## 2023-06-15 PROCEDURE — 83735 ASSAY OF MAGNESIUM: CPT

## 2023-06-15 PROCEDURE — 83970 ASSAY OF PARATHORMONE: CPT

## 2023-06-15 RX ORDER — SODIUM CHLORIDE 9 MG/ML
20 INJECTION, SOLUTION INTRAVENOUS ONCE
Status: CANCELLED | OUTPATIENT
Start: 2023-06-22

## 2023-06-15 RX ORDER — ACETAMINOPHEN 325 MG/1
650 TABLET ORAL ONCE
Status: CANCELLED | OUTPATIENT
Start: 2023-06-22

## 2023-06-15 RX ORDER — CITALOPRAM 20 MG/1
TABLET ORAL
Qty: 30 TABLET | Refills: 3 | Status: SHIPPED | OUTPATIENT
Start: 2023-06-15

## 2023-06-15 NOTE — RESULT ENCOUNTER NOTE
Please inform patient that renal function has improved to creatinine 2 9 with lower dose of torsemide 20 mg every other day  Electrolytes are stable, please ask if he has any worsening lower extremity edema or shortness of breath  He has some protein in the urine which is worsening, will continue to monitor  He is already on [place] which would help  Continue same treatment for now if no significant lower extremity edema    Thank you

## 2023-06-15 NOTE — TELEPHONE ENCOUNTER
I spoke to Nuria Cornell he is not having any swelling at this time so no changes to be made at this time  - pt questioned if he can be sent September labs prior to his f/u on 9/5/2023

## 2023-06-15 NOTE — TELEPHONE ENCOUNTER
Looks like the lab ran all the test   I have ordered for blood work and urine test to be done before the office visit in September

## 2023-06-15 NOTE — TELEPHONE ENCOUNTER
----- Message from Rigoberto Trujillo MD sent at 6/15/2023 12:57 PM EDT -----  Please inform patient that renal function has improved to creatinine 2 9 with lower dose of torsemide 20 mg every other day  Electrolytes are stable, please ask if he has any worsening lower extremity edema or shortness of breath  He has some protein in the urine which is worsening, will continue to monitor  He is already on [place] which would help  Continue same treatment for now if no significant lower extremity edema    Thank you

## 2023-06-19 ENCOUNTER — APPOINTMENT (OUTPATIENT)
Dept: LAB | Facility: CLINIC | Age: 83
End: 2023-06-19
Payer: MEDICARE

## 2023-06-19 DIAGNOSIS — D63.1 ANEMIA DUE TO STAGE 4 CHRONIC KIDNEY DISEASE (HCC): ICD-10-CM

## 2023-06-19 DIAGNOSIS — N18.4 ANEMIA DUE TO STAGE 4 CHRONIC KIDNEY DISEASE (HCC): ICD-10-CM

## 2023-06-19 DIAGNOSIS — R82.89 POSITIVE URINARY CYTOLOGY: ICD-10-CM

## 2023-06-19 DIAGNOSIS — C67.8 MALIGNANT NEOPLASM OF OVERLAPPING SITES OF BLADDER (HCC): ICD-10-CM

## 2023-06-19 DIAGNOSIS — D49.4 BLADDER TUMOR: ICD-10-CM

## 2023-06-19 DIAGNOSIS — C67.5 MALIGNANT NEOPLASM OF URINARY BLADDER NECK (HCC): ICD-10-CM

## 2023-06-19 LAB
ALBUMIN SERPL BCP-MCNC: 3.7 G/DL (ref 3.5–5)
ALP SERPL-CCNC: 98 U/L (ref 46–116)
ALT SERPL W P-5'-P-CCNC: 15 U/L (ref 12–78)
ANION GAP SERPL CALCULATED.3IONS-SCNC: 5 MMOL/L (ref 4–13)
AST SERPL W P-5'-P-CCNC: 11 U/L (ref 5–45)
BASOPHILS # BLD AUTO: 0.05 THOUSANDS/ÂΜL (ref 0–0.1)
BASOPHILS NFR BLD AUTO: 0 % (ref 0–1)
BILIRUB SERPL-MCNC: 0.49 MG/DL (ref 0.2–1)
BUN SERPL-MCNC: 58 MG/DL (ref 5–25)
CALCIUM SERPL-MCNC: 10 MG/DL (ref 8.3–10.1)
CHLORIDE SERPL-SCNC: 106 MMOL/L (ref 96–108)
CO2 SERPL-SCNC: 23 MMOL/L (ref 21–32)
CREAT SERPL-MCNC: 2.94 MG/DL (ref 0.6–1.3)
EOSINOPHIL # BLD AUTO: 0.18 THOUSAND/ÂΜL (ref 0–0.61)
EOSINOPHIL NFR BLD AUTO: 1 % (ref 0–6)
ERYTHROCYTE [DISTWIDTH] IN BLOOD BY AUTOMATED COUNT: 15 % (ref 11.6–15.1)
GFR SERPL CREATININE-BSD FRML MDRD: 18 ML/MIN/1.73SQ M
GLUCOSE SERPL-MCNC: 151 MG/DL (ref 65–140)
HCT VFR BLD AUTO: 38.6 % (ref 36.5–49.3)
HGB BLD-MCNC: 13 G/DL (ref 12–17)
IMM GRANULOCYTES # BLD AUTO: 0.11 THOUSAND/UL (ref 0–0.2)
IMM GRANULOCYTES NFR BLD AUTO: 1 % (ref 0–2)
LYMPHOCYTES # BLD AUTO: 1 THOUSANDS/ÂΜL (ref 0.6–4.47)
LYMPHOCYTES NFR BLD AUTO: 7 % (ref 14–44)
MCH RBC QN AUTO: 31.6 PG (ref 26.8–34.3)
MCHC RBC AUTO-ENTMCNC: 33.7 G/DL (ref 31.4–37.4)
MCV RBC AUTO: 94 FL (ref 82–98)
MONOCYTES # BLD AUTO: 1.26 THOUSAND/ÂΜL (ref 0.17–1.22)
MONOCYTES NFR BLD AUTO: 9 % (ref 4–12)
NEUTROPHILS # BLD AUTO: 11.86 THOUSANDS/ÂΜL (ref 1.85–7.62)
NEUTS SEG NFR BLD AUTO: 82 % (ref 43–75)
NRBC BLD AUTO-RTO: 0 /100 WBCS
PLATELET # BLD AUTO: 193 THOUSANDS/UL (ref 149–390)
PMV BLD AUTO: 12.1 FL (ref 8.9–12.7)
POTASSIUM SERPL-SCNC: 4.3 MMOL/L (ref 3.5–5.3)
PROT SERPL-MCNC: 7.9 G/DL (ref 6.4–8.4)
RBC # BLD AUTO: 4.11 MILLION/UL (ref 3.88–5.62)
SODIUM SERPL-SCNC: 134 MMOL/L (ref 135–147)
T3FREE SERPL-MCNC: 2.94 PG/ML (ref 2.5–3.9)
TSH SERPL DL<=0.05 MIU/L-ACNC: 2.24 UIU/ML (ref 0.45–4.5)
WBC # BLD AUTO: 14.46 THOUSAND/UL (ref 4.31–10.16)

## 2023-06-19 PROCEDURE — 85025 COMPLETE CBC W/AUTO DIFF WBC: CPT

## 2023-06-19 PROCEDURE — 84443 ASSAY THYROID STIM HORMONE: CPT

## 2023-06-19 PROCEDURE — 36415 COLL VENOUS BLD VENIPUNCTURE: CPT

## 2023-06-19 PROCEDURE — 80053 COMPREHEN METABOLIC PANEL: CPT

## 2023-06-19 PROCEDURE — 84481 FREE ASSAY (FT-3): CPT

## 2023-06-20 ENCOUNTER — OFFICE VISIT (OUTPATIENT)
Dept: HEMATOLOGY ONCOLOGY | Facility: CLINIC | Age: 83
End: 2023-06-20
Payer: MEDICARE

## 2023-06-20 VITALS
HEART RATE: 52 BPM | WEIGHT: 188 LBS | SYSTOLIC BLOOD PRESSURE: 144 MMHG | DIASTOLIC BLOOD PRESSURE: 80 MMHG | HEIGHT: 67 IN | BODY MASS INDEX: 29.51 KG/M2 | OXYGEN SATURATION: 97 % | TEMPERATURE: 98.3 F

## 2023-06-20 DIAGNOSIS — C67.8 MALIGNANT NEOPLASM OF OVERLAPPING SITES OF BLADDER (HCC): Primary | ICD-10-CM

## 2023-06-20 PROCEDURE — 99214 OFFICE O/P EST MOD 30 MIN: CPT | Performed by: INTERNAL MEDICINE

## 2023-06-20 NOTE — PROGRESS NOTES
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, recurrence status post mitomycin instillation  Cystoscopy on 04/2020 showed carcinoma in situ no evidence of invasive component     He received Pembrolizumab 200 mg flat dose every 3 weeks on 05/26/2020  After 2 cycles he had grade 3 elevation of the liver enzymes, thickening of the skin of the forearms consistent with toxicity to Pembrolizumab     Status post cystoprostatectomy with urinary diversion to an ileal conduit on 10/26/2020        2  5/2022 pet/ct-  recurrent disease in the right iliac, pelvic area with extension into the aortic bifurcation      Evaluated by Dr Ayo Kearns at [address] [place] [address], urethectomy discussed but ultimately not a surgical candidate       9/2022 pelvic MRI - Multiple suspicious and indeterminate left common, external, and internal iliac lymph nodes up to the aortoiliac bifurcation  Retroperitoneal lymphatic chain is not imaged in entirety  Indeterminate right common iliac lymph nodes  Suspicious lymph node or implant about the left obturator internus  Small pelvic peritoneal fluid of uncertain nature     Initiated on carboplatin/gemcitabine on 09/30/2022  CT scan on December 2022 showed no evidence of disease in the abdomen or pelvis     Therapy was complicated with anemia requiring dose reductions and Aranesp which had been beneficial        3  CKD   Cr 2 2-3 3 since 1/2021  Follows with Dr Edwin Moise        4   CT scan on March 2023 a showed mesenteric lymphadenopathy stable, right iliac lymphadenopathy stable, no new lesions this might be reactive versus residual disease  To distinguish;  PET scan 5/1/23 performed  1 4 x 1 7 cm metabolically active lesion in the left pelvic sidewall (SUV max 5 0)   In directed retrospect, the lesion is similar in size compared to recent CT examinations dating back to 11/30/2022, but new from an older CT of   10/8/2021  Findings are suspicious for recurrence of disease     Previously noted 1 cm fluid-attenuating lesion in the right pelvic sidewall is not metabolically active on PET  Interval resolution of the previously noted mesenteric lymph node    Therapy changed to avelumab 800 mg flat dose every 2 weeks, proceed for a total of 3 months of treatment and then will do CAT scan of the abdomen the pelvis        Labs and imaging studies are reviewed by ordering provider once results are available  If there are findings that need immediate attention, you will be contacted when results available  Discussing results and the implication on your healthcare is best discussed in person at your follow-up visit         HPI:    66-year-old  male with history of hypertension, coronary artery disease status post open heart surgery, diabetes mellitus type 2, diabetic neuropathy, he had a history of recurrent superficial bladder cancer status post many BCG unfortunately refractory to BCG, he had 1 time trans urethral resection of the bladder tumor and mitomycin instillation     Repeat cystoscopy on 04/16/2020 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, no evidence of invasive bladder cancers     He does not smoke, he drinks bourbon and vodka every night     Denies any headache blurred vision nausea vomiting diarrhea constipation dysuria hematuria melena hematochezia heat or cold intolerance        Initiated on Pembrolizumab 200 mg flat dose every 3 weeks on 05/26/2020, after 2 doses there was grade 3 elevation in the liver enzyme, alkaline phosphatase requiring discontinuation of Pembrolizumab, and later on normalization of the liver enzymes     Evaluated by ID there was no evidence of TB of the liver     Made prn 9/8/2020         Status post radical cystoprostatectomy to ileal conduit urinary diversion on 10/26/2020     Urethral Biopsy 4/5/22  A  Urethra (biopsy):  - High grade, non-invasive papillary urothelial carcinoma   - No muscularis propria identified     9/13/22 CT scan abdomen at [facility]- Few mildly enlarged mesenteric and retroperitoneal lymph nodes   Scattered small nodules at the lung bases, favored to be benign      Possibility of urethrectomy discussed  Ultimately, he was not a candidate      Represented to our office 9/20/22 9/30/22 Carboplatin AUC 4 day 1, gemcitabine 1000 mg per m2 on days 1 and 8 every 21 days initiated        Treatment changed to gemcitabine 800 mg per m2, carboplatin AUC 4 q 2 weeks with cycle 5 1/01/44  complicated with anemia requiring holding chemotherapy in December 2022      CAT scan in December 2022 showed no evidence of lymphadenopathy in the abdomen or pelvis     He continued gemcitabine/carboplatin and Aranesp 200 mcg every other week for anemia induced by chemotherapy; finished a total of 6 cycles     CAT scan in March 2023 showed stable mesenteric lymphadenopathy, right iliac lymphadenopathy might be reactive or consistent with metastatic disease     PET scan 1/5/2023 showed 1 7 x 1 4 cm metabolically active lesion in the left pelvic sidewall similar in size complaint to the previous CAT scan dated 1/11/2022 but the new from the old of CAT scans on 10/1/2021 suspicious for recurrence of disease    He is being treated with avelumab 800 mg flat dose every 2 weeks           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 initiated on Aranesp, with exacerbation of congestive heart failure by anemia,    Molecular tests:     TERT, ARID1A mutation     Interval History:        Previous Treatment:         Test Results:    Imaging: No results found      Labs:   Lab Results   Component Value Date    WBC 14 46 (H) 06/19/2023    HGB 13 0 06/19/2023    HCT 38 6 06/19/2023    MCV 94 06/19/2023  06/19/2023     Lab Results   Component Value Date     08/13/2015    K 4 3 06/19/2023     06/19/2023    CO2 23 06/19/2023    ANIONGAP 10 08/13/2015    BUN 58 (H) 06/19/2023    CREATININE 2 94 (H) 06/19/2023    GLUCOSE 128 08/13/2015    GLUF 151 (H) 06/15/2023    CALCIUM 10 0 06/19/2023    CORRECTEDCA 10 0 06/06/2023    AST 11 06/19/2023    ALT 15 06/19/2023    ALKPHOS 98 06/19/2023    PROT 6 8 08/13/2015    BILITOT 0 58 08/13/2015    EGFR 18 06/19/2023       Lab Results   Component Value Date    IRON 70 02/14/2023    TIBC 285 02/14/2023    FERRITIN 242 02/14/2023       Lab Results   Component Value Date    AHHPBTIZ21 666 08/13/2015         ROS: Review of Systems   Constitutional: Negative  Negative for appetite change, chills, diaphoresis, fatigue, fever and unexpected weight change  HENT:   Negative for hearing loss, lump/mass, mouth sores, nosebleeds, sore throat, trouble swallowing and voice change  Eyes: Negative  Negative for eye problems and icterus  Respiratory: Negative  Negative for chest tightness, cough, hemoptysis and shortness of breath  Cardiovascular: Negative for chest pain and leg swelling  Gastrointestinal: Negative for abdominal distention, abdominal pain, blood in stool, constipation, diarrhea and nausea  Endocrine: Negative  Genitourinary: Negative for dysuria, frequency, hematuria and pelvic pain  Musculoskeletal: Negative  Negative for arthralgias, back pain, flank pain, gait problem, myalgias and neck stiffness  Skin: Negative for itching and rash  Neurological: Negative for dizziness, gait problem, headaches, light-headedness, numbness and speech difficulty  Hematological: Negative for adenopathy  Does not bruise/bleed easily  Psychiatric/Behavioral: Negative for confusion, decreased concentration, depression and sleep disturbance  The patient is not nervous/anxious             Current Medications: Reviewed  Allergies: Reviewed  PMH/FH/SH: Reviewed      Physical Exam:    Body surface area is 1 97 meters squared  Wt Readings from Last 3 Encounters:   23 85 3 kg (188 lb)   23 85 4 kg (188 lb 3 2 oz)   23 81 8 kg (180 lb 6 4 oz)        Temp Readings from Last 3 Encounters:   23 98 3 °F (36 8 °C) (Temporal)   23 (!) 97 °F (36 1 °C)   23 (!) 96 7 °F (35 9 °C) (Temporal)        BP Readings from Last 3 Encounters:   23 144/80   23 144/62   23 156/80         Pulse Readings from Last 3 Encounters:   23 (!) 52   23 76   23 72        Physical Exam  Vitals reviewed  Constitutional:       General: He is not in acute distress  Appearance: He is well-developed  He is not diaphoretic  HENT:      Head: Normocephalic and atraumatic  Eyes:      Conjunctiva/sclera: Conjunctivae normal    Neck:      Trachea: No tracheal deviation  Cardiovascular:      Rate and Rhythm: Normal rate and regular rhythm  Heart sounds: No murmur heard  No friction rub  No gallop  Pulmonary:      Effort: Pulmonary effort is normal  No respiratory distress  Breath sounds: Normal breath sounds  No wheezing or rales  Chest:      Chest wall: No tenderness  Abdominal:      General: There is no distension  Palpations: Abdomen is soft  Tenderness: There is no abdominal tenderness  Musculoskeletal:      Cervical back: Normal range of motion and neck supple  Right lower leg: Edema present  Left lower leg: Edema present  Lymphadenopathy:      Cervical: No cervical adenopathy  Skin:     General: Skin is warm and dry  Coloration: Skin is not pale  Findings: No erythema  Neurological:      Mental Status: He is alert and oriented to person, place, and time  Psychiatric:         Behavior: Behavior normal          Thought Content:  Thought content normal          Judgment: Judgment normal          ECO  Goals and Barriers:  Current Goal: Minimize effects of disease  Barriers: None  Patient's Capacity to Self Care:  Patient is able to self care      Code Status: @[facility]@

## 2023-06-21 ENCOUNTER — OFFICE VISIT (OUTPATIENT)
Dept: PHYSICAL THERAPY | Facility: REHABILITATION | Age: 83
End: 2023-06-21
Payer: MEDICARE

## 2023-06-21 DIAGNOSIS — M25.552 LEFT HIP PAIN: Primary | ICD-10-CM

## 2023-06-21 PROCEDURE — 97110 THERAPEUTIC EXERCISES: CPT

## 2023-06-21 PROCEDURE — 97112 NEUROMUSCULAR REEDUCATION: CPT

## 2023-06-21 PROCEDURE — 97140 MANUAL THERAPY 1/> REGIONS: CPT

## 2023-06-21 NOTE — PROGRESS NOTES
"Daily Note     Today's date: 2023  Patient name: Alesha Luciano  : 1940  MRN: [id number]  Referring provider: Arthur Diamond*  Dx:   Encounter Diagnosis     ICD-10-CM    1  Left hip pain  M25 552           Start Time: 1130  Stop Time: 1210  Total time in clinic (min): 40 minutes    Subjective: Patient reports his hip continues to improve  Objective: See treatment diary below      Assessment: Patient tolerating activities and progressions well  Symptoms are improving with each week as indicated by improvements in FOTO score  Continue to progress as tolerated  Patient would benefit from continued PT      Plan: Continue per plan of care        Precautions: standard, hx of cancer, CHF      Manuals        Prone Hip ER/IR stretching with STM to glute  PRR           LAD   PRR Gr3-4 3rds PRR Gr3-4 3rds PRR Gr3-4 3rds PRR Gr3-4 3rds       FOTO      55/57       Re-Assessment  20' 15'          Neuro Re-Ed                                                                                                        Ther Ex             Pt Edu, HEP, POC 10'            DKTC HEP reviewed           Repeated Motion Tesitng  20'           Std Hip Abd    Yb 2x10 ea Yb 2x10 ea Yb 2x10 ea       Hip Abd Stretch   PRR PRR PRR np resume       HERMELINDA Stretch w/ OP   PRR PRR PRR np resume       Bridges   2x10 yhb 2x10 yhb 2x10 rhb 2x10 rhb       Supine Clamshell   2x10 5\" hold yhb 2x15 5\" hold yhb 2x10 5\" hold rhb 2x10 5\" hold rhb       VG     DL L5 5' DL L7 5' DL L7 5'       Ther Activity                                       Gait Training                                       Modalities                                            "

## 2023-06-22 ENCOUNTER — HOSPITAL ENCOUNTER (OUTPATIENT)
Dept: INFUSION CENTER | Facility: HOSPITAL | Age: 83
Discharge: HOME | End: 2023-06-22
Attending: INTERNAL MEDICINE
Payer: MEDICARE

## 2023-06-22 VITALS
DIASTOLIC BLOOD PRESSURE: 92 MMHG | TEMPERATURE: 97.1 F | HEART RATE: 72 BPM | SYSTOLIC BLOOD PRESSURE: 185 MMHG | RESPIRATION RATE: 18 BRPM

## 2023-06-22 DIAGNOSIS — C67.8 MALIGNANT NEOPLASM OF OVERLAPPING SITES OF BLADDER (HCC): ICD-10-CM

## 2023-06-22 DIAGNOSIS — R82.89 POSITIVE URINARY CYTOLOGY: ICD-10-CM

## 2023-06-22 DIAGNOSIS — D63.1 ANEMIA DUE TO STAGE 4 CHRONIC KIDNEY DISEASE (HCC): ICD-10-CM

## 2023-06-22 DIAGNOSIS — N18.4 ANEMIA DUE TO STAGE 4 CHRONIC KIDNEY DISEASE (HCC): ICD-10-CM

## 2023-06-22 DIAGNOSIS — D49.4 BLADDER TUMOR: ICD-10-CM

## 2023-06-22 DIAGNOSIS — C67.5 MALIGNANT NEOPLASM OF URINARY BLADDER NECK (HCC): Primary | ICD-10-CM

## 2023-06-22 PROCEDURE — 96367 TX/PROPH/DG ADDL SEQ IV INF: CPT

## 2023-06-22 PROCEDURE — 96413 CHEMO IV INFUSION 1 HR: CPT

## 2023-06-22 RX ORDER — SODIUM CHLORIDE 9 MG/ML
20 INJECTION, SOLUTION INTRAVENOUS ONCE
Status: COMPLETED | OUTPATIENT
Start: 2023-06-22 | End: 2023-06-22

## 2023-06-22 RX ORDER — ACETAMINOPHEN 325 MG/1
650 TABLET ORAL ONCE
Status: COMPLETED | OUTPATIENT
Start: 2023-06-22 | End: 2023-06-22

## 2023-06-22 RX ADMIN — SODIUM CHLORIDE 20 ML/HR: 0.9 INJECTION, SOLUTION INTRAVENOUS at 13:09

## 2023-06-22 RX ADMIN — ACETAMINOPHEN 650 MG: 325 TABLET ORAL at 13:11

## 2023-06-22 RX ADMIN — AVELUMAB 800 MG: 20 INJECTION, SOLUTION, CONCENTRATE INTRAVENOUS at 14:05

## 2023-06-22 RX ADMIN — DIPHENHYDRAMINE HYDROCHLORIDE 25 MG: 50 INJECTION, SOLUTION INTRAMUSCULAR; INTRAVENOUS at 13:09

## 2023-06-22 NOTE — PLAN OF CARE
Problem: Potential for Falls  Goal: Patient will remain free of falls  Description: INTERVENTIONS:  - Educate patient/family on patient safety including physical limitations  - Instruct patient to call for assistance with activity   - Consult OT/PT to assist with strengthening/mobility   - Keep Call bell within reach  - Keep bed low and locked with side rails adjusted as appropriate  - Keep care items and personal belongings within reach  - Initiate and maintain comfort rounds  - - Apply yellow socks and bracelet for high fall risk patients  - Consider moving patient to room near nurses station  Outcome: Progressing

## 2023-06-23 DIAGNOSIS — K20.90 BARRETT'S ESOPHAGUS WITH ESOPHAGITIS: ICD-10-CM

## 2023-06-23 DIAGNOSIS — K22.70 BARRETT'S ESOPHAGUS WITH ESOPHAGITIS: ICD-10-CM

## 2023-06-23 RX ORDER — OMEPRAZOLE 40 MG/1
CAPSULE, DELAYED RELEASE ORAL
Qty: 90 CAPSULE | Refills: 3 | Status: SHIPPED | OUTPATIENT
Start: 2023-06-23

## 2023-06-28 ENCOUNTER — OFFICE VISIT (OUTPATIENT)
Dept: PHYSICAL THERAPY | Facility: REHABILITATION | Age: 83
End: 2023-06-28
Payer: MEDICARE

## 2023-06-28 DIAGNOSIS — M25.552 LEFT HIP PAIN: Primary | ICD-10-CM

## 2023-06-28 PROCEDURE — 97110 THERAPEUTIC EXERCISES: CPT

## 2023-06-28 PROCEDURE — 97112 NEUROMUSCULAR REEDUCATION: CPT

## 2023-06-28 PROCEDURE — 97140 MANUAL THERAPY 1/> REGIONS: CPT

## 2023-06-28 NOTE — PROGRESS NOTES
"Daily Note     Today's date: 2023  Patient name: Sunday Limon  : 1940  MRN: [id number]  Referring provider: Mauro Bello*  Dx:   Encounter Diagnosis     ICD-10-CM    1  Left hip pain  M25 552           Start Time: 1145  Stop Time: 1230  Total time in clinic (min): 45 minutes    Subjective: Patient reports he is noticing improvements with each session  Patient reports his symptoms are the most noticeable towards the end of the day when he is settling down  Objective: See treatment diary below      Assessment: Patient tolerated all activities well today without symptoms  Patient left hip pain and function continues to progress with each week  Progress activities as tolerated  Patient would benefit from continued PT  l    Plan: Continue per plan of care        Precautions: standard, hx of cancer, CHF      Manuals       Prone Hip ER/IR stretching with STM to glute  PRR           LAD   PRR Gr3-4 3rds PRR Gr3-4 3rds PRR Gr3-4 3rds PRR Gr3-4 3rds PRR Gr3-4 3rds      FOTO             Re-Assessment  20' 15'          Neuro Re-Ed                                                                                                        Ther Ex             Pt Edu, HEP, POC 10'            DKTC HEP reviewed           Repeated Motion Tesitng  20'           SLR Flexion       2x8      Std Hip Abd    Yb 2x10 ea Yb 2x10 ea Yb 2x10 ea Yb 2x10 ea      Hip Abd Stretch   PRR PRR PRR np resume PRR      HERMELINDA Stretch w/ OP   PRR PRR PRR np resume PRR      Bridges   2x10 yhb 2x10 yhb 2x10 rhb 2x10 rhb 2x15 rhb      Supine Clamshell   2x10 5\" hold yhb 2x15 5\" hold yhb 2x10 5\" hold rhb 2x10 5\" hold rhb 2x15 5\" hold rhb      VG     DL L5 5' DL L7 5' DL L7 5' DL L7 5      Ther Activity                                       Gait Training                                       Modalities                                            "

## 2023-06-29 DIAGNOSIS — C67.8 MALIGNANT NEOPLASM OF OVERLAPPING SITES OF BLADDER (HCC): ICD-10-CM

## 2023-06-29 RX ORDER — SODIUM CHLORIDE 9 MG/ML
20 INJECTION, SOLUTION INTRAVENOUS ONCE
Status: CANCELLED | OUTPATIENT
Start: 2023-07-06

## 2023-06-29 RX ORDER — TRAMADOL HYDROCHLORIDE 50 MG/1
TABLET ORAL
Qty: 20 TABLET | Refills: 0 | Status: SHIPPED | OUTPATIENT
Start: 2023-06-29

## 2023-06-29 RX ORDER — ACETAMINOPHEN 325 MG/1
650 TABLET ORAL ONCE
Status: CANCELLED | OUTPATIENT
Start: 2023-07-06

## 2023-07-03 ENCOUNTER — APPOINTMENT (OUTPATIENT)
Dept: LAB | Facility: CLINIC | Age: 83
End: 2023-07-03
Payer: MEDICARE

## 2023-07-03 DIAGNOSIS — C67.8 MALIGNANT NEOPLASM OF OVERLAPPING SITES OF BLADDER (HCC): ICD-10-CM

## 2023-07-03 DIAGNOSIS — C67.5 MALIGNANT NEOPLASM OF URINARY BLADDER NECK (HCC): ICD-10-CM

## 2023-07-03 LAB
ALBUMIN SERPL BCP-MCNC: 3.3 G/DL (ref 3.5–5)
ALP SERPL-CCNC: 80 U/L (ref 46–116)
ALT SERPL W P-5'-P-CCNC: 20 U/L (ref 12–78)
ANION GAP SERPL CALCULATED.3IONS-SCNC: 2 MMOL/L
AST SERPL W P-5'-P-CCNC: 10 U/L (ref 5–45)
BASOPHILS # BLD AUTO: 0.05 THOUSANDS/ÂΜL (ref 0–0.1)
BASOPHILS NFR BLD AUTO: 0 % (ref 0–1)
BILIRUB SERPL-MCNC: 0.36 MG/DL (ref 0.2–1)
BUN SERPL-MCNC: 53 MG/DL (ref 5–25)
CALCIUM ALBUM COR SERPL-MCNC: 10.1 MG/DL (ref 8.3–10.1)
CALCIUM SERPL-MCNC: 9.5 MG/DL (ref 8.3–10.1)
CHLORIDE SERPL-SCNC: 110 MMOL/L (ref 96–108)
CO2 SERPL-SCNC: 23 MMOL/L (ref 21–32)
CREAT SERPL-MCNC: 2.64 MG/DL (ref 0.6–1.3)
EOSINOPHIL # BLD AUTO: 0.25 THOUSAND/ÂΜL (ref 0–0.61)
EOSINOPHIL NFR BLD AUTO: 2 % (ref 0–6)
ERYTHROCYTE [DISTWIDTH] IN BLOOD BY AUTOMATED COUNT: 15 % (ref 11.6–15.1)
GFR SERPL CREATININE-BSD FRML MDRD: 21 ML/MIN/1.73SQ M
GLUCOSE SERPL-MCNC: 254 MG/DL (ref 65–140)
HCT VFR BLD AUTO: 37.9 % (ref 36.5–49.3)
HGB BLD-MCNC: 11.9 G/DL (ref 12–17)
IMM GRANULOCYTES # BLD AUTO: 0.09 THOUSAND/UL (ref 0–0.2)
IMM GRANULOCYTES NFR BLD AUTO: 1 % (ref 0–2)
LYMPHOCYTES # BLD AUTO: 0.96 THOUSANDS/ÂΜL (ref 0.6–4.47)
LYMPHOCYTES NFR BLD AUTO: 7 % (ref 14–44)
MCH RBC QN AUTO: 30.1 PG (ref 26.8–34.3)
MCHC RBC AUTO-ENTMCNC: 31.4 G/DL (ref 31.4–37.4)
MCV RBC AUTO: 96 FL (ref 82–98)
MONOCYTES # BLD AUTO: 1.03 THOUSAND/ÂΜL (ref 0.17–1.22)
MONOCYTES NFR BLD AUTO: 8 % (ref 4–12)
NEUTROPHILS # BLD AUTO: 10.57 THOUSANDS/ÂΜL (ref 1.85–7.62)
NEUTS SEG NFR BLD AUTO: 82 % (ref 43–75)
NRBC BLD AUTO-RTO: 0 /100 WBCS
PLATELET # BLD AUTO: 175 THOUSANDS/UL (ref 149–390)
PMV BLD AUTO: 12.2 FL (ref 8.9–12.7)
POTASSIUM SERPL-SCNC: 4.5 MMOL/L (ref 3.5–5.3)
PROT SERPL-MCNC: 7.6 G/DL (ref 6.4–8.4)
RBC # BLD AUTO: 3.95 MILLION/UL (ref 3.88–5.62)
SODIUM SERPL-SCNC: 135 MMOL/L (ref 135–147)
T3FREE SERPL-MCNC: 2.6 PG/ML (ref 2.5–3.9)
TSH SERPL DL<=0.05 MIU/L-ACNC: 2.2 UIU/ML (ref 0.45–4.5)
WBC # BLD AUTO: 12.95 THOUSAND/UL (ref 4.31–10.16)

## 2023-07-03 PROCEDURE — 36415 COLL VENOUS BLD VENIPUNCTURE: CPT

## 2023-07-03 PROCEDURE — 80053 COMPREHEN METABOLIC PANEL: CPT

## 2023-07-03 PROCEDURE — 85025 COMPLETE CBC W/AUTO DIFF WBC: CPT

## 2023-07-03 PROCEDURE — 84443 ASSAY THYROID STIM HORMONE: CPT

## 2023-07-03 PROCEDURE — 84481 FREE ASSAY (FT-3): CPT

## 2023-07-05 ENCOUNTER — OFFICE VISIT (OUTPATIENT)
Dept: PHYSICAL THERAPY | Facility: REHABILITATION | Age: 83
End: 2023-07-05
Payer: MEDICARE

## 2023-07-05 DIAGNOSIS — M25.552 LEFT HIP PAIN: Primary | ICD-10-CM

## 2023-07-05 PROCEDURE — 97110 THERAPEUTIC EXERCISES: CPT

## 2023-07-05 PROCEDURE — 97112 NEUROMUSCULAR REEDUCATION: CPT

## 2023-07-05 NOTE — PROGRESS NOTES
Daily Note     Today's date: 2023  Patient name: Ozzy Silva  : 1940  MRN: [id number]  Referring provider: Chan Roca*  Dx:   Encounter Diagnosis     ICD-10-CM    1. Left hip pain  M25.552                      Subjective: pt stated that he hasn't needed to take pain medication in the last couple of days. Objective: See treatment diary below      Assessment: Tolerated treatment well. Patient is able to complete TE without adverse effects. Hip abd is limited with PRE's. Stretching continues to be beneficial to promote extensibility, more so for the adductors. Progress as able. Plan: Continue per plan of care.        Precautions: standard, hx of cancer, CHF      Manuals      Prone Hip ER/IR stretching with STM to glute  PRR           LAD   PRR Gr3-4 3rds PRR Gr3-4 3rds PRR Gr3-4 3rds PRR Gr3-4 3rds PRR Gr3-4 3rds PRN     FOTO             Re-Assessment  20' 15'          Neuro Re-Ed                                                                                                        Ther Ex             Pt Edu, HEP, POC 10'            DKTC HEP reviewed           Repeated Motion Tesitng  20'           SLR Flexion       2x8 2x10     Std Hip Abd    Yb 2x10 ea Yb 2x10 ea Yb 2x10 ea Yb 2x10 ea Yb 2x10 ea     Hip Abd Stretch   PRR PRR PRR np resume PRR MB     HERMELINDA Stretch w/ OP   PRR PRR PRR np resume PRR MB     Bridges   2x10 yhb 2x10 yhb 2x10 rhb 2x10 rhb 2x15 rhb 2x15 rhb     Supine Clamshell   2x10 5" hold yhb 2x15 5" hold yhb 2x10 5" hold rhb 2x10 5" hold rhb 2x15 5" hold rhb 2x15 5" hold rhb     VG     DL L5 5' DL L7 5' DL L7 5' DL L7 5 DL L7 5     Ther Activity                                       Gait Training                                       Modalities

## 2023-07-06 ENCOUNTER — HOSPITAL ENCOUNTER (OUTPATIENT)
Dept: INFUSION CENTER | Facility: HOSPITAL | Age: 83
Discharge: HOME | End: 2023-07-06
Attending: INTERNAL MEDICINE
Payer: MEDICARE

## 2023-07-06 VITALS
SYSTOLIC BLOOD PRESSURE: 144 MMHG | RESPIRATION RATE: 18 BRPM | HEART RATE: 78 BPM | TEMPERATURE: 98.5 F | DIASTOLIC BLOOD PRESSURE: 71 MMHG

## 2023-07-06 DIAGNOSIS — N18.4 ANEMIA DUE TO STAGE 4 CHRONIC KIDNEY DISEASE (HCC): ICD-10-CM

## 2023-07-06 DIAGNOSIS — C67.8 MALIGNANT NEOPLASM OF OVERLAPPING SITES OF BLADDER (HCC): ICD-10-CM

## 2023-07-06 DIAGNOSIS — C67.5 MALIGNANT NEOPLASM OF URINARY BLADDER NECK (HCC): Primary | ICD-10-CM

## 2023-07-06 DIAGNOSIS — D49.4 BLADDER TUMOR: ICD-10-CM

## 2023-07-06 DIAGNOSIS — D63.1 ANEMIA DUE TO STAGE 4 CHRONIC KIDNEY DISEASE (HCC): ICD-10-CM

## 2023-07-06 DIAGNOSIS — R82.89 POSITIVE URINARY CYTOLOGY: ICD-10-CM

## 2023-07-06 PROCEDURE — 96367 TX/PROPH/DG ADDL SEQ IV INF: CPT

## 2023-07-06 PROCEDURE — 96413 CHEMO IV INFUSION 1 HR: CPT

## 2023-07-06 RX ORDER — SODIUM CHLORIDE 9 MG/ML
20 INJECTION, SOLUTION INTRAVENOUS ONCE
Status: DISCONTINUED | OUTPATIENT
Start: 2023-07-06 | End: 2023-07-09 | Stop reason: HOSPADM

## 2023-07-06 RX ORDER — ACETAMINOPHEN 325 MG/1
650 TABLET ORAL ONCE
Status: COMPLETED | OUTPATIENT
Start: 2023-07-06 | End: 2023-07-06

## 2023-07-06 RX ADMIN — ACETAMINOPHEN 650 MG: 325 TABLET, FILM COATED ORAL at 14:08

## 2023-07-06 RX ADMIN — DIPHENHYDRAMINE HYDROCHLORIDE 25 MG: 50 INJECTION, SOLUTION INTRAMUSCULAR; INTRAVENOUS at 14:08

## 2023-07-06 RX ADMIN — AVELUMAB 800 MG: 20 INJECTION, SOLUTION, CONCENTRATE INTRAVENOUS at 14:58

## 2023-07-11 ENCOUNTER — OFFICE VISIT (OUTPATIENT)
Dept: CARDIOLOGY CLINIC | Facility: CLINIC | Age: 83
End: 2023-07-11
Payer: MEDICARE

## 2023-07-11 ENCOUNTER — RA CDI HCC (OUTPATIENT)
Dept: OTHER | Facility: HOSPITAL | Age: 83
End: 2023-07-11

## 2023-07-11 VITALS
SYSTOLIC BLOOD PRESSURE: 138 MMHG | WEIGHT: 182 LBS | DIASTOLIC BLOOD PRESSURE: 80 MMHG | HEART RATE: 56 BPM | OXYGEN SATURATION: 98 % | BODY MASS INDEX: 28.51 KG/M2

## 2023-07-11 DIAGNOSIS — I45.10 RBBB: ICD-10-CM

## 2023-07-11 DIAGNOSIS — I12.9 BENIGN HYPERTENSION WITH CHRONIC KIDNEY DISEASE, STAGE IV (HCC): Primary | ICD-10-CM

## 2023-07-11 DIAGNOSIS — I25.5 ISCHEMIC CARDIOMYOPATHY: ICD-10-CM

## 2023-07-11 DIAGNOSIS — N18.4 BENIGN HYPERTENSION WITH CHRONIC KIDNEY DISEASE, STAGE IV (HCC): Primary | ICD-10-CM

## 2023-07-11 DIAGNOSIS — Z95.1 HX OF CABG: ICD-10-CM

## 2023-07-11 DIAGNOSIS — I25.10 CORONARY ARTERY DISEASE INVOLVING NATIVE CORONARY ARTERY OF NATIVE HEART WITHOUT ANGINA PECTORIS: ICD-10-CM

## 2023-07-11 DIAGNOSIS — E78.2 MIXED HYPERLIPIDEMIA: ICD-10-CM

## 2023-07-11 PROBLEM — N17.9 ACUTE KIDNEY INJURY (HCC): Status: RESOLVED | Noted: 2018-05-30 | Resolved: 2023-07-11

## 2023-07-11 PROCEDURE — 99214 OFFICE O/P EST MOD 30 MIN: CPT | Performed by: INTERNAL MEDICINE

## 2023-07-11 NOTE — PROGRESS NOTES
I13.0   [address] [facility] coding opportunities          Chart Reviewed number of suggestions sent to Provider: 1     Patients Insurance     Medicare Insurance: [facility]

## 2023-07-11 NOTE — PROGRESS NOTES
Cardiology Follow Up    [facility]  1940  [id number]  [facility] [address]  [address]  [facility] [address] [phone]    1. Benign hypertension with chronic kidney disease, stage IV ([facility])        2. Coronary artery disease involving native coronary artery of native heart without angina pectoris        3. RBBB        4. Ischemic cardiomyopathy        5. Hx of CABG        6. Mixed hyperlipidemia              Discussion/Summary: All of his assessed cardiac problems are stable. I have reviewed his medications and made no changes. No cardiac testing is ordered. RTO 9 months. Interval History: He has not had any cardiac problems since his last office visit. He is not very active. He does some walking in [employer]. He denies CP, SOB. He has CAD with prior CABG. EF 45 %, PASP 58 mmHg by echo 12/2022    He is on Torsemide 20 mg QOD. BUN 53  Creatinine 2.64    /80    Weight is stable aty 182 lbs.     Patient Active Problem List   Diagnosis   • Arteriosclerotic cardiovascular disease   • Backache   • DMII (diabetes mellitus, type 2) ([facility])   • Hyperlipidemia   • Wright's esophagus with esophagitis   • Overactive bladder   • Bladder tumor   • 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)   • Hx of CABG   • Malignant neoplasm of overlapping sites of bladder ([address])   • Closed fracture of upper end of right fibula   • History of bladder cancer   • Coronary artery disease involving native coronary artery of native heart without angina pectoris   • Ischemic cardiomyopathy   • Positive urinary cytology   • 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)   • Liver function study, abnormal   • Elevated troponin   • Forearm mass, left   • Fungal dermatitis   • Anxiety and depression   • Overweight   • Ambulatory dysfunction   • Frequent falls   • Right sided Pelvic abscess in male [facility])   • Severe protein-calorie malnutrition ([address])   • Metabolic acidosis   • Hypomagnesemia   • RBBB   • Benign hypertension with chronic kidney disease, stage IV (HCC)   • Persistent proteinuria   • Anemia   • Chronic kidney disease-mineral and bone disorder   • Visual hallucinations   • Functional diarrhea   • Platelets decreased (HCC)   • Urethral tumor   • Secondary hyperparathyroidism of renal origin [facility])   • Stage 4 chronic kidney disease (HCC)   • Anemia due to stage 4 chronic kidney disease (HCC)   • Shortness of breath   • Chemotherapy-induced thrombocytopenia   • Rib pain on left side   • Left inguinal pain   • Chronic diastolic CHF (congestive heart failure) ([facility])   • Pelvic lymphadenopathy     Past Medical History:   Diagnosis Date   • Acute kidney injury ([address])    • Anemia Jan. 2022   • Arthritis     Hands   • Wright esophagus    • BPH with obstruction/lower urinary tract symptoms    • CAD (coronary artery disease)     Last assessed 09/16/15   • Cancer ([address])     bladder   • Cataract, acquired     Last assessed 10/10/17   • Chronic kidney disease    • Coronary artery disease    • Diabetes mellitus ([address])     NIDDM   • Diabetic neuropathy ([address])     Feet   • Enlarged prostate with lower urinary tract symptoms (LUTS)     Last assessed 10/10/17   • Erectile dysfunction    • GERD (gastroesophageal reflux disease)     Last assessed 10/10/17   • Hemoptysis     Last assessed 03/14/16   • Hypercholesterolemia     Last assessed 10/10/17   • Hypertension     Last assessed 10/10/17   • Kidney stone    • Macular degeneration     Right eye is particularly affected-peripheral vision intact   • Myocardial infarction [facility]) 1998   • OAB (overactive bladder)    • Testicular hypofunction    • Testicular hypogonadism     Last assessed 10/10/17   • Tinnitus    • Umbilical hernia     Last assessed 06/18/14   • Urge incontinence of urine    • Wears glasses      Social History     Socioeconomic History   • Marital status: /Civil Union     Spouse name: Not on file   • Number of children: Not on file   • Years of education: Not on file   • Highest education level: Not on file   Occupational History   • Occupation: Sales position   Tobacco Use   • Smoking status: Former     Packs/day: 1.00     Years: 10.00     Total pack years: 10.00     Types: Cigarettes     Quit date: 1972     Years since quittin.5   • Smokeless tobacco: Never   • Tobacco comments:     Quit at age 28   Vaping Use   • Vaping Use: Never used   Substance and Sexual Activity   • Alcohol use: Not Currently     Alcohol/week: 2.0 standard drinks of alcohol     Types: 1 Glasses of wine, 1 Cans of beer per week     Comment: Non since 7/15/2020   • Drug use: Never   • Sexual activity: Not Currently     Partners: Female   Other Topics Concern   • Not on file   Social History Narrative    Always uses seatbelt        Caffeine use- Drinks 2 cups of coffee daily     Social Determinants of Health     Financial Resource Strain: Low Risk  (2022)    Overall Financial Resource Strain (CARDIA)    • Difficulty of Paying Living Expenses: Not very hard   Food Insecurity: Not on file   Transportation Needs: No Transportation Needs (2022)    PRAPARE - Transportation    • Lack of Transportation (Medical): No    • Lack of Transportation (Non-Medical):  No   Physical Activity: Not on file   Stress: Not on file   Social Connections: Not on file   Intimate Partner Violence: Not on file   Housing Stability: Not on file      Family History   Problem Relation Age of Onset   • Cancer Mother         Topical oral abrasian caused cancer   • Other Mother         Digestive System Complications   • Diabetes Father    • Heart disease Father    • Hypertension Father    • Coronary artery disease Father    • Hyperlipidemia Father      Past Surgical History:   Procedure Laterality Date   • BLADDER SURGERY     • COLONOSCOPY     • CORONARY ARTERY BYPASS GRAFT 07/16/2014    ABG x 4 LIMA to LAD,SVG to diagnoal 2 SVG to OM-1, SVG to PDA, resection of partial plerual mass   • CT GUIDED PERC DRAINAGE CATHETER PLACEMENT  02/19/2021   • CYSTOSCOPY  2013   • CYSTOSCOPY  02/08/2022    Olya   • ESOPHAGOGASTRODUODENOSCOPY     • FL RETROGRADE PYELOGRAM  12/20/2018   • FL RETROGRADE PYELOGRAM  12/05/2019   • INGUINAL HERNIA REPAIR Bilateral 2015   • IR DRAINAGE TUBE CHECK AND/OR REMOVAL  03/05/2021   • PHOTODYNAMIC THERAPY      For Barretts esophagus   • CA COLONOSCOPY FLX DX W/COLLJ SPEC WHEN PFRMD N/A 04/25/2016    Procedure: COLONOSCOPY;  Surgeon: Claude Leung MD;  Location: [facility]; Service: Gastroenterology   • CA CYSTO W/REMOVAL OF LESIONS SMALL N/A 12/05/2019    Procedure: CYSTO W/TURBT AND TRANSURETHRAL PROSTATE BIOPSY AND OPENING OF BLADDER NECK CONTRACTURE, B/L Retrograde pyelogram;  Surgeon: Karl Opitz, MD;  Location: [facility];  Service: Urology   • CA CYSTOURETHROSCOPY W/DEST &/RMVL MED BLADDER TY N/A 04/16/2020    Procedure: CYSTOSCOPY, TRANSURETHRAL RESECTION OF BLADDER TUMOR (TURBT);   Surgeon: Karl Opitz, MD;  Location[facility];  Service: Urology   • CA CYSTOURETHROSCOPY WITH BIOPSY N/A 09/10/2020    Procedure: CYSTOSCOPY, COLLECTION OF LEFT KIDNEY CYTOLOGY, BILATERAL RETROGRADE PYELOGRAM, BLADDER WALL BIOPSIES  AND FULGERAION, RANDOM BLADDER TUMOR BIOPSIES;  Surgeon: Karl Opitz, MD;  Location: [facility];  Service: Urology   • CA CYSTOURETHROSCOPY WITH BIOPSY N/A 04/05/2022    Procedure: urethroscopy , biopsy of urethra with fulgeration;  Surgeon: Judy Chun MD;  Location:  MAIN OR;  Service: Urology   • PROSTATE SURGERY     • TONSILLECTOMY     • TRANSURETHRAL RESECTION OF PROSTATE N/A 12/20/2018    Procedure: CYSTO, PHOTO SELECTIVE VAPORIZATION OF PROSTATE, B/L RETROGRADE PYELOGRAM, TURBT;  Surgeon: Karl Opitz, MD;  Location[facility];  Service: Urology   • UMBILICAL HERNIA REPAIR  2012   • UPPER GASTROINTESTINAL ENDOSCOPY         Current Outpatient Medications:   •  atorvastatin (LIPITOR) 40 mg tablet, TAKE ONE TABLET BY MOUTH AT BEDTIME, Disp: 90 tablet, Rfl: 3  •  Blood Glucose Monitoring Suppl (OneTouch Verio Flex System) w/Device KIT, Use to check blood sugars daily, Disp: 1 kit, Rfl: 0  •  calcitriol (ROCALTROL) 0.25 mcg capsule, Take 1 tablet 2 times a week (Monday, Friday), Disp: 24 capsule, Rfl: 3  •  Cholecalciferol (VITAMIN D) 50 MCG (2000 UT) tablet, Take 2,000 Units by mouth daily, Disp: , Rfl:   •  citalopram (CeleXA) 20 mg tablet, TAKE ONE TABLET BY MOUTH EVERY DAY, Disp: 30 tablet, Rfl: 3  •  Farxiga 5 MG TABS, TAKE ONE TABLET BY MOUTH EVERY DAY, Disp: 90 tablet, Rfl: 2  •  ferrous sulfate 324 (65 Fe) mg, Take 1 tablet (324 mg total) by mouth daily, Disp: 30 tablet, Rfl: 5  •  Lancets (OneTouch Delica Plus [id number]) MISC, TEST BLOOD GLUCOSE ONCE DAILY, Disp: 100 each, Rfl: 0  •  losartan (COZAAR) 25 mg tablet, Take 25 mg by mouth daily, Disp: , Rfl:   •  magnesium oxide (MAG-OX) 400 mg, Take 1 tablet (400 mg total) by mouth in the morning., Disp: 180 tablet, Rfl: 2  •  metoprolol tartrate (LOPRESSOR) 25 mg tablet, Take 1 tablet (25 mg total) by mouth every 12 (twelve) hours, Disp: 180 tablet, Rfl: 3  •  Multiple Vitamins-Minerals (OCUVITE-LUTEIN PO), Take 1 tablet by mouth 2 (two) times a day Pt is taking preservision , Disp: , Rfl:   •  Omega-3 Fatty Acids (Fish Oil) 1200 MG CPDR, Take by mouth in the morning, Disp: , Rfl:   •  omeprazole (PriLOSEC) 40 MG capsule, TAKE ONE CAPSULE BY MOUTH EVERY MORNING, Disp: 90 capsule, Rfl: 3  •  ondansetron (ZOFRAN) 4 mg tablet, Take 1 tablet (4 mg total) by mouth every 8 (eight) hours as needed for nausea or vomiting, Disp: 20 tablet, Rfl: 1  •  OneTouch Verio test strip, TEST ONCE A DAY, Disp: 100 strip, Rfl: 0  •  sodium bicarbonate 650 mg tablet, Take 2 tablets (1,300 mg total) by mouth 2 (two) times a day, Disp: 360 tablet, Rfl: 3  •  torsemide (DEMADEX) 20 mg tablet, Take 1 tablet (20 mg total) by mouth every other day, Disp: 90 tablet, Rfl: 0  •  traMADol (ULTRAM) 50 mg tablet, TAKE ONE TABLET BY MOUTH EVERY 6 HOURS AS NEEDED FOR MODERATE PAIN, Disp: 20 tablet, Rfl: 0  Allergies   Allergen Reactions   • Fentanyl Hallucinations   • Morphine And Related Other (See Comments)     While having an MI patient received morphine and had adverse reaction but doesn't know what happened. Vitals:    07/11/23 1443   BP: 138/80   BP Location: Left arm   Patient Position: Sitting   Cuff Size: Standard   Pulse: 56   SpO2: 98%   Weight: 82.6 kg (182 lb)     Weight (last 2 days)     Date/Time Weight    07/11/23 1443 82.6 (182)         Blood pressure 138/80, pulse 56, weight 82.6 kg (182 lb), SpO2 98 %. , Body mass index is 28.51 kg/m².     Labs:  Appointment on 07/03/2023   Component Date Value   • WBC 07/03/2023 12.95 (H)    • RBC 07/03/2023 3.95    • Hemoglobin 07/03/2023 11.9 (L)    • Hematocrit 07/03/2023 37.9    • MCV 07/03/2023 96    • MCH 07/03/2023 30.1    • MCHC 07/03/2023 31.4    • RDW 07/03/2023 15.0    • MPV 07/03/2023 12.2    • Platelets 63/25/9323 175    • nRBC 07/03/2023 0    • Neutrophils Relative 07/03/2023 82 (H)    • Immat GRANS % 07/03/2023 1    • Lymphocytes Relative 07/03/2023 7 (L)    • Monocytes Relative 07/03/2023 8    • Eosinophils Relative 07/03/2023 2    • Basophils Relative 07/03/2023 0    • Neutrophils Absolute 07/03/2023 10.57 (H)    • Immature Grans Absolute 07/03/2023 0.09    • Lymphocytes Absolute 07/03/2023 0.96    • Monocytes Absolute 07/03/2023 1.03    • Eosinophils Absolute 07/03/2023 0.25    • Basophils Absolute 07/03/2023 0.05    • Sodium 07/03/2023 135    • Potassium 07/03/2023 4.5    • Chloride 07/03/2023 110 (H)    • CO2 07/03/2023 23    • ANION GAP 07/03/2023 2    • BUN 07/03/2023 53 (H)    • Creatinine 07/03/2023 2.64 (H)    • Glucose 07/03/2023 254 (H)    • Calcium 07/03/2023 9.5    • Corrected Calcium 07/03/2023 10.1    • AST 07/03/2023 10 • ALT 07/03/2023 20    • Alkaline Phosphatase 07/03/2023 80    • Total Protein 07/03/2023 7.6    • Albumin 07/03/2023 3.3 (L)    • Total Bilirubin 07/03/2023 0.36    • eGFR 07/03/2023 21    • TSH 3RD GENERATON 07/03/2023 2. 203    • T3, Free 07/03/2023 2.60    Appointment on 06/19/2023   Component Date Value   • WBC 06/19/2023 14.46 (H)    • RBC 06/19/2023 4.11    • Hemoglobin 06/19/2023 13.0    • Hematocrit 06/19/2023 38.6    • MCV 06/19/2023 94    • MCH 06/19/2023 31.6    • MCHC 06/19/2023 33.7    • RDW 06/19/2023 15.0    • MPV 06/19/2023 12.1    • Platelets 30/50/0471 193    • nRBC 06/19/2023 0    • Neutrophils Relative 06/19/2023 82 (H)    • Immat GRANS % 06/19/2023 1    • Lymphocytes Relative 06/19/2023 7 (L)    • Monocytes Relative 06/19/2023 9    • Eosinophils Relative 06/19/2023 1    • Basophils Relative 06/19/2023 0    • Neutrophils Absolute 06/19/2023 11.86 (H)    • Immature Grans Absolute 06/19/2023 0.11    • Lymphocytes Absolute 06/19/2023 1.00    • Monocytes Absolute 06/19/2023 1.26 (H)    • Eosinophils Absolute 06/19/2023 0.18    • Basophils Absolute 06/19/2023 0.05    • Sodium 06/19/2023 134 (L)    • Potassium 06/19/2023 4.3    • Chloride 06/19/2023 106    • CO2 06/19/2023 23    • ANION GAP 06/19/2023 5    • BUN 06/19/2023 58 (H)    • Creatinine 06/19/2023 2.94 (H)    • Glucose 06/19/2023 151 (H)    • Calcium 06/19/2023 10.0    • AST 06/19/2023 11    • ALT 06/19/2023 15    • Alkaline Phosphatase 06/19/2023 98    • Total Protein 06/19/2023 7.9    • Albumin 06/19/2023 3.7    • Total Bilirubin 06/19/2023 0.49    • eGFR 06/19/2023 18    • TSH 3RD GENERATON 06/19/2023 2.239    • T3, Free 06/19/2023 2.94    Lab on 06/15/2023   Component Date Value   • Sodium 06/15/2023 135    • Potassium 06/15/2023 4.6    • Chloride 06/15/2023 105    • CO2 06/15/2023 24    • ANION GAP 06/15/2023 6    • BUN 06/15/2023 48 (H)    • Creatinine 06/15/2023 2.98 (H)    • Glucose, Fasting 06/15/2023 151 (H)    • Calcium 06/15/2023 9.6    • eGFR 06/15/2023 18    • Creatinine, Ur 06/15/2023 34.0    • Protein Urine Random 06/15/2023 36    • Prot/Creat Ratio, Ur 06/15/2023 1.06 (H)    • Phosphorus 06/15/2023 3.8    • PTH 06/15/2023 67.7    • Creatinine, Ur 06/15/2023 33.9    • Albumin,U,Random 06/15/2023 123.0 (H)    • Albumin Creat Ratio 06/15/2023 363 (H)    • Magnesium 06/15/2023 2.4    • WBC 06/15/2023 13.94 (H)    • RBC 06/15/2023 4.21    • Hemoglobin 06/15/2023 13.1    • Hematocrit 06/15/2023 40.9    • MCV 06/15/2023 97    • MCH 06/15/2023 31.1    • MCHC 06/15/2023 32.0    • RDW 06/15/2023 15.0    • Platelets 69/07/5356 188    • MPV 06/15/2023 11.8    Appointment on 06/06/2023   Component Date Value   • WBC 06/06/2023 10.01    • RBC 06/06/2023 3.88    • Hemoglobin 06/06/2023 12.3    • Hematocrit 06/06/2023 38.3    • MCV 06/06/2023 99 (H)    • MCH 06/06/2023 31.7    • MCHC 06/06/2023 32.1    • RDW 06/06/2023 15.0    • MPV 06/06/2023 12.5    • Platelets 85/63/4660 162    • nRBC 06/06/2023 0    • Neutrophils Relative 06/06/2023 81 (H)    • Immat GRANS % 06/06/2023 1    • Lymphocytes Relative 06/06/2023 6 (L)    • Monocytes Relative 06/06/2023 10    • Eosinophils Relative 06/06/2023 2    • Basophils Relative 06/06/2023 0    • Neutrophils Absolute 06/06/2023 8.14 (H)    • Immature Grans Absolute 06/06/2023 0.05    • Lymphocytes Absolute 06/06/2023 0.64    • Monocytes Absolute 06/06/2023 0.96    • Eosinophils Absolute 06/06/2023 0.19    • Basophils Absolute 06/06/2023 0.03    • Sodium 06/06/2023 133 (L)    • Potassium 06/06/2023 5.2    • Chloride 06/06/2023 104    • CO2 06/06/2023 24    • ANION GAP 06/06/2023 5    • BUN 06/06/2023 60 (H)    • Creatinine 06/06/2023 3.28 (H)    • Glucose 06/06/2023 215 (H)    • Calcium 06/06/2023 9.5    • Corrected Calcium 06/06/2023 10.0    • AST 06/06/2023 13    • ALT 06/06/2023 19    • Alkaline Phosphatase 06/06/2023 98    • Total Protein 06/06/2023 7.2    • Albumin 06/06/2023 3.4 (L)    • Total Bilirubin 06/06/2023 0.60    • eGFR 06/06/2023 16    • TSH 3RD GENERATON 06/06/2023 2.460    • T3, Free 06/06/2023 2.12 (L)    Office Visit on 06/05/2023   Component Date Value   • Hemoglobin A1C 06/05/2023 6.3    Appointment on 05/23/2023   Component Date Value   • Sodium 05/23/2023 134 (L)    • Potassium 05/23/2023 4.4    • Chloride 05/23/2023 104    • CO2 05/23/2023 27    • ANION GAP 05/23/2023 3 (L)    • BUN 05/23/2023 48 (H)    • Creatinine 05/23/2023 2.96 (H)    • Glucose, Fasting 05/23/2023 143 (H)    • Calcium 05/23/2023 9.5    • AST 05/23/2023 13    • ALT 05/23/2023 14    • Alkaline Phosphatase 05/23/2023 98    • Total Protein 05/23/2023 7.9    • Albumin 05/23/2023 3.6    • Total Bilirubin 05/23/2023 0.46    • eGFR 05/23/2023 18    • TSH 3RD GENERATON 05/23/2023 2. 251    • T3, Free 05/23/2023 3.02    Appointment on 05/23/2023   Component Date Value   • WBC 05/23/2023 10.14    • RBC 05/23/2023 4.10    • Hemoglobin 05/23/2023 12.9    • Hematocrit 05/23/2023 40.0    • MCV 05/23/2023 98    • MCH 05/23/2023 31.5    • MCHC 05/23/2023 32.3    • RDW 05/23/2023 14.7    • MPV 05/23/2023 12.5    • Platelets 47/23/2530 172    • nRBC 05/23/2023 0    • Neutrophils Relative 05/23/2023 74    • Immat GRANS % 05/23/2023 1    • Lymphocytes Relative 05/23/2023 13 (L)    • Monocytes Relative 05/23/2023 9    • Eosinophils Relative 05/23/2023 2    • Basophils Relative 05/23/2023 1    • Neutrophils Absolute 05/23/2023 7.55    • Immature Grans Absolute 05/23/2023 0.06    • Lymphocytes Absolute 05/23/2023 1.32    • Monocytes Absolute 05/23/2023 0.92    • Eosinophils Absolute 05/23/2023 0.24    • Basophils Absolute 05/23/2023 0.05    Hospital Outpatient Visit on 05/01/2023   Component Date Value   • POC Glucose 05/01/2023 150 (H)    Lab on 03/28/2023   Component Date Value   • Sodium 03/28/2023 135    • Potassium 03/28/2023 4.0    • Chloride 03/28/2023 107    • CO2 03/28/2023 24    • ANION GAP 03/28/2023 4    • BUN 03/28/2023 52 (H)    • Creatinine 03/28/2023 2.86 (H)    • Glucose, Fasting 03/28/2023 176 (H)    • Calcium 03/28/2023 9.2    • eGFR 03/28/2023 19    • WBC 03/28/2023 8.23    • RBC 03/28/2023 3.12 (L)    • Hemoglobin 03/28/2023 10.1 (L)    • Hematocrit 03/28/2023 31.3 (L)    • MCV 03/28/2023 100 (H)    • MCH 03/28/2023 32.4    • MCHC 03/28/2023 32.3    • RDW 03/28/2023 20.8 (H)    • MPV 03/28/2023 11.5    • Platelets 07/97/0751 266    • nRBC 03/28/2023 0    • Neutrophils Relative 03/28/2023 72    • Immat GRANS % 03/28/2023 1    • Lymphocytes Relative 03/28/2023 10 (L)    • Monocytes Relative 03/28/2023 16 (H)    • Eosinophils Relative 03/28/2023 1    • Basophils Relative 03/28/2023 0    • Neutrophils Absolute 03/28/2023 5.89    • Immature Grans Absolute 03/28/2023 0.07    • Lymphocytes Absolute 03/28/2023 0.83    • Monocytes Absolute 03/28/2023 1.30 (H)    • Eosinophils Absolute 03/28/2023 0.11    • Basophils Absolute 03/28/2023 0.03    Ancillary Orders on 03/24/2023   Component Date Value   • Citrated Platelets 04/76/0885 211    There may be more visits with results that are not included. Imaging: No results found. Review of Systems:  Review of Systems   Constitutional: Negative for diaphoresis, fatigue, fever and unexpected weight change. HENT: Negative. Respiratory: Negative for cough, shortness of breath and wheezing. Cardiovascular: Negative for chest pain, palpitations and leg swelling. Gastrointestinal: Negative for abdominal pain, diarrhea and nausea. Musculoskeletal: Negative for gait problem and myalgias. Skin: Negative for rash. Neurological: Negative for dizziness and numbness. Psychiatric/Behavioral: Negative. Physical Exam:  Physical Exam  Constitutional:       Appearance: He is well-developed. HENT:      Head: Normocephalic and atraumatic. Eyes:      Pupils: Pupils are equal, round, and reactive to light. Neck:      Vascular: No JVD.    Cardiovascular:      Rate and Rhythm: Regular rhythm. Pulses: Normal pulses. Carotid pulses are 2+ on the right side and 2+ on the left side. Heart sounds: S1 normal and S2 normal.   Pulmonary:      Effort: Pulmonary effort is normal.      Breath sounds: Normal breath sounds. No wheezing or rales. Abdominal:      General: Bowel sounds are normal.      Palpations: Abdomen is soft. Tenderness: There is no abdominal tenderness. Musculoskeletal:         General: No tenderness. Normal range of motion. Cervical back: Normal range of motion and neck supple. Skin:     General: Skin is warm. Neurological:      Mental Status: He is alert and oriented to person, place, and time. Cranial Nerves: No cranial nerve deficit. Deep Tendon Reflexes: Reflexes are normal and symmetric.

## 2023-07-13 ENCOUNTER — OFFICE VISIT (OUTPATIENT)
Dept: PHYSICAL THERAPY | Facility: REHABILITATION | Age: 83
End: 2023-07-13
Payer: MEDICARE

## 2023-07-13 DIAGNOSIS — M25.552 LEFT HIP PAIN: Primary | ICD-10-CM

## 2023-07-13 PROCEDURE — 97110 THERAPEUTIC EXERCISES: CPT

## 2023-07-13 PROCEDURE — 97112 NEUROMUSCULAR REEDUCATION: CPT

## 2023-07-13 RX ORDER — SODIUM CHLORIDE 9 MG/ML
20 INJECTION, SOLUTION INTRAVENOUS ONCE
Status: CANCELLED | OUTPATIENT
Start: 2023-07-20

## 2023-07-13 RX ORDER — ACETAMINOPHEN 325 MG/1
650 TABLET ORAL ONCE
Status: CANCELLED | OUTPATIENT
Start: 2023-07-20

## 2023-07-13 NOTE — PROGRESS NOTES
Daily Note     Today's date: 2023  Patient name: Bear Ferris  : 1940  MRN: [id number]  Referring provider: Vick Denver*  Dx:   Encounter Diagnosis     ICD-10-CM    1. Left hip pain  M25.552           Start Time: 1100  Stop Time: 1140  Total time in clinic (min): 40 minutes    Subjective: Patient reports his hips is doing well. States he has not needed any pain medications in over 2 weeks. Objective: See treatment diary below      Assessment: Patient progressing nicely since the start of PT. Patient is tolerating all progressions without issues. Continue to progress as tolerated. Patient would benefit from continued PT      Plan: Continue per plan of care.       Precautions: standard, hx of cancer, CHF      Manuals     Prone Hip ER/IR stretching with STM to glute  PRR           LAD   PRR Gr3-4 3rds PRR Gr3-4 3rds PRR Gr3-4 3rds PRR Gr3-4 3rds PRR Gr3-4 3rds PRN PRR Gr3-4 3rds    FOTO             Re-Assessment  20' 15'          Neuro Re-Ed                                                                                                        Ther Ex             Pt Edu, HEP, POC 10'            DKTC HEP reviewed           Repeated Motion Tesitng  20'           SLR Flexion       2x8 2x10     Std Hip Abd    Yb 2x10 ea Yb 2x10 ea Yb 2x10 ea Yb 2x10 ea Yb 2x10 ea Yb 2x10 ea    Std Hip Abd + Ext         Yb 2x10 ea    Hip Abd Stretch   PRR PRR PRR np resume PRR MB PRR    HERMELINDA Stretch w/ OP   PRR PRR PRR np resume PRR MB PRR    Bridges   2x10 yhb 2x10 yhb 2x10 rhb 2x10 rhb 2x15 rhb 2x15 rhb 2x10 bhb    Supine Clamshell   2x10 5" hold yhb 2x15 5" hold yhb 2x10 5" hold rhb 2x10 5" hold rhb 2x15 5" hold rhb 2x15 5" hold rhb 2x10 5" hold bhb    VG     DL L5 5' DL L7 5' DL L7 5' DL L7 5 DL L7 5 DL L7 5'    Ther Activity                                       Gait Training                                       Modalities

## 2023-07-17 DIAGNOSIS — C67.5 MALIGNANT NEOPLASM OF URINARY BLADDER NECK (HCC): ICD-10-CM

## 2023-07-17 DIAGNOSIS — D49.4 BLADDER TUMOR: ICD-10-CM

## 2023-07-17 DIAGNOSIS — C67.8 MALIGNANT NEOPLASM OF OVERLAPPING SITES OF BLADDER (HCC): ICD-10-CM

## 2023-07-17 DIAGNOSIS — R82.89 POSITIVE URINARY CYTOLOGY: ICD-10-CM

## 2023-07-17 DIAGNOSIS — D63.1 ANEMIA DUE TO STAGE 4 CHRONIC KIDNEY DISEASE (HCC): Primary | ICD-10-CM

## 2023-07-17 DIAGNOSIS — N18.4 ANEMIA DUE TO STAGE 4 CHRONIC KIDNEY DISEASE (HCC): Primary | ICD-10-CM

## 2023-07-18 ENCOUNTER — APPOINTMENT (OUTPATIENT)
Dept: LAB | Facility: CLINIC | Age: 83
End: 2023-07-18
Payer: MEDICARE

## 2023-07-18 ENCOUNTER — OFFICE VISIT (OUTPATIENT)
Dept: INTERNAL MEDICINE CLINIC | Facility: CLINIC | Age: 83
End: 2023-07-18
Payer: MEDICARE

## 2023-07-18 VITALS
WEIGHT: 181 LBS | BODY MASS INDEX: 28.35 KG/M2 | HEART RATE: 69 BPM | OXYGEN SATURATION: 97 % | SYSTOLIC BLOOD PRESSURE: 128 MMHG | TEMPERATURE: 98 F | DIASTOLIC BLOOD PRESSURE: 70 MMHG

## 2023-07-18 DIAGNOSIS — I12.9 BENIGN HYPERTENSION WITH CHRONIC KIDNEY DISEASE, STAGE IV (HCC): ICD-10-CM

## 2023-07-18 DIAGNOSIS — I25.10 ARTERIOSCLEROTIC CARDIOVASCULAR DISEASE: Primary | ICD-10-CM

## 2023-07-18 DIAGNOSIS — C67.5 MALIGNANT NEOPLASM OF URINARY BLADDER NECK (HCC): ICD-10-CM

## 2023-07-18 DIAGNOSIS — C67.8 MALIGNANT NEOPLASM OF OVERLAPPING SITES OF BLADDER (HCC): ICD-10-CM

## 2023-07-18 DIAGNOSIS — D49.4 BLADDER TUMOR: ICD-10-CM

## 2023-07-18 DIAGNOSIS — R29.6 FREQUENT FALLS: ICD-10-CM

## 2023-07-18 DIAGNOSIS — N18.4 BENIGN HYPERTENSION WITH CHRONIC KIDNEY DISEASE, STAGE IV (HCC): ICD-10-CM

## 2023-07-18 DIAGNOSIS — E11.59 TYPE 2 DIABETES MELLITUS WITH OTHER CIRCULATORY COMPLICATION, WITHOUT LONG-TERM CURRENT USE OF INSULIN (HCC): ICD-10-CM

## 2023-07-18 LAB
ALBUMIN SERPL BCP-MCNC: 3.1 G/DL (ref 3.5–5)
ALP SERPL-CCNC: 81 U/L (ref 46–116)
ALT SERPL W P-5'-P-CCNC: 14 U/L (ref 12–78)
ANION GAP SERPL CALCULATED.3IONS-SCNC: 3 MMOL/L
AST SERPL W P-5'-P-CCNC: 9 U/L (ref 5–45)
BASOPHILS # BLD AUTO: 0.06 THOUSANDS/ÂΜL (ref 0–0.1)
BASOPHILS NFR BLD AUTO: 1 % (ref 0–1)
BILIRUB SERPL-MCNC: 0.61 MG/DL (ref 0.2–1)
BUN SERPL-MCNC: 57 MG/DL (ref 5–25)
CALCIUM ALBUM COR SERPL-MCNC: 10.2 MG/DL (ref 8.3–10.1)
CALCIUM SERPL-MCNC: 9.5 MG/DL (ref 8.3–10.1)
CHLORIDE SERPL-SCNC: 104 MMOL/L (ref 96–108)
CO2 SERPL-SCNC: 26 MMOL/L (ref 21–32)
CREAT SERPL-MCNC: 2.91 MG/DL (ref 0.6–1.3)
EOSINOPHIL # BLD AUTO: 0.44 THOUSAND/ÂΜL (ref 0–0.61)
EOSINOPHIL NFR BLD AUTO: 3 % (ref 0–6)
ERYTHROCYTE [DISTWIDTH] IN BLOOD BY AUTOMATED COUNT: 15.4 % (ref 11.6–15.1)
GFR SERPL CREATININE-BSD FRML MDRD: 19 ML/MIN/1.73SQ M
GLUCOSE P FAST SERPL-MCNC: 175 MG/DL (ref 65–99)
HCT VFR BLD AUTO: 38.7 % (ref 36.5–49.3)
HGB BLD-MCNC: 12.6 G/DL (ref 12–17)
IMM GRANULOCYTES # BLD AUTO: 0.12 THOUSAND/UL (ref 0–0.2)
IMM GRANULOCYTES NFR BLD AUTO: 1 % (ref 0–2)
LYMPHOCYTES # BLD AUTO: 0.88 THOUSANDS/ÂΜL (ref 0.6–4.47)
LYMPHOCYTES NFR BLD AUTO: 7 % (ref 14–44)
MCH RBC QN AUTO: 30.4 PG (ref 26.8–34.3)
MCHC RBC AUTO-ENTMCNC: 32.6 G/DL (ref 31.4–37.4)
MCV RBC AUTO: 94 FL (ref 82–98)
MONOCYTES # BLD AUTO: 1.41 THOUSAND/ÂΜL (ref 0.17–1.22)
MONOCYTES NFR BLD AUTO: 11 % (ref 4–12)
NEUTROPHILS # BLD AUTO: 10.41 THOUSANDS/ÂΜL (ref 1.85–7.62)
NEUTS SEG NFR BLD AUTO: 77 % (ref 43–75)
NRBC BLD AUTO-RTO: 0 /100 WBCS
PLATELET # BLD AUTO: 197 THOUSANDS/UL (ref 149–390)
PMV BLD AUTO: 13.2 FL (ref 8.9–12.7)
POTASSIUM SERPL-SCNC: 5.1 MMOL/L (ref 3.5–5.3)
PROT SERPL-MCNC: 7.5 G/DL (ref 6.4–8.4)
RBC # BLD AUTO: 4.14 MILLION/UL (ref 3.88–5.62)
SODIUM SERPL-SCNC: 133 MMOL/L (ref 135–147)
T3FREE SERPL-MCNC: 2.93 PG/ML (ref 2.5–3.9)
TSH SERPL DL<=0.05 MIU/L-ACNC: 2.1 UIU/ML (ref 0.45–4.5)
WBC # BLD AUTO: 13.32 THOUSAND/UL (ref 4.31–10.16)

## 2023-07-18 PROCEDURE — 99214 OFFICE O/P EST MOD 30 MIN: CPT | Performed by: INTERNAL MEDICINE

## 2023-07-18 PROCEDURE — 36415 COLL VENOUS BLD VENIPUNCTURE: CPT

## 2023-07-18 PROCEDURE — 84443 ASSAY THYROID STIM HORMONE: CPT

## 2023-07-18 PROCEDURE — 80053 COMPREHEN METABOLIC PANEL: CPT

## 2023-07-18 PROCEDURE — 85025 COMPLETE CBC W/AUTO DIFF WBC: CPT

## 2023-07-18 PROCEDURE — 84481 FREE ASSAY (FT-3): CPT

## 2023-07-18 NOTE — ASSESSMENT & PLAN NOTE
Lab Results   Component Value Date    EGFR 21 07/03/2023    EGFR 18 06/19/2023    EGFR 18 06/15/2023    CREATININE 2.64 (H) 07/03/2023    CREATININE 2.94 (H) 06/19/2023    CREATININE 2.98 (H) 06/15/2023   As noted patient's GFR has gone up to 21 his creatinine has been decreased to 2.6. Patient continues to follow-up with nephrology and is showing some mild improvement. He will continue with follow-up not only with his nephrologist but also with cardiology looking at the balance between congestive heart failure and his kidney function.

## 2023-07-18 NOTE — ASSESSMENT & PLAN NOTE
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 Patient now has had problems with cancer in 2 ureter and is following up with not only his oncologist but also urology.

## 2023-07-18 NOTE — PROGRESS NOTES
Name: Deo Lane      : 1940      MRN: [id number]  Encounter Provider: Lucy Gibbs DO  Encounter Date: 2023   Encounter department: [facility]    Assessment & Plan     1. Arteriosclerotic cardiovascular disease    2. Benign hypertension with chronic kidney disease, stage IV [facility])  Assessment & Plan:  Lab Results   Component Value Date    EGFR 21 2023    EGFR 18 2023    EGFR 18 06/15/2023    CREATININE 2.64 (H) 2023    CREATININE 2.94 (H) 2023    CREATININE 2.98 (H) 06/15/2023   As noted patient's GFR has gone up to 21 his creatinine has been decreased to 2.6. Patient continues to follow-up with nephrology and is showing some mild improvement. He will continue with follow-up not only with his nephrologist but also with cardiology looking at the balance between congestive heart failure and his kidney function. 3. 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 Patient now has had problems with cancer in 2 ureter and is following up with not only his oncologist but also urology. 4. Type 2 diabetes mellitus with other circulatory complication, without long-term current use of insulin ([address])  Assessment & Plan:  Patient did have labs performed prior to the visit today his hemoglobin A1c is stable at 6.3. Patient's wife is keeping an eye on his dietary intake and patient is on medication as previously and continues to monitor his sugar levels at home. Having no episodes of hypoglycemia. Patient will continue present medication and surveillance and we will adjust medication in the future. Lab Results   Component Value Date    HGBA1C 6.3 2023         5. Frequent falls  Assessment & Plan: With the patient having difficulty with frequent falls he was sent and evaluated by physical therapy.   Patient now has some increasing stability with gait. I did discuss with the patient using a cane with ambulation more to prevent a fall with any kind of significant injury. Subjective     Patient is a 55-year-old male history of extensive medical problems outlined previously who is here today for follow-up of accompanied by his wife. Patient is continuing to follow-up with his specialist including urology, cardiology, nephrology, hematology oncology. States that he is doing relatively well. His wife is concerned because he did have an episode 2 days ago where he slept through the night and then slept also through the day. Patient relates that he was extremely tired but his energy level is improved right now. He is compliant with his medication    Review of Systems   Constitutional: Positive for activity change. Negative for appetite change, chills, diaphoresis, fatigue, fever and unexpected weight change. He is more ambulatory with no recent falls. HENT: Negative. Eyes: Negative. Respiratory: Negative. Cardiovascular: Negative. Gastrointestinal: Positive for abdominal pain. Negative for abdominal distention, anal bleeding, blood in stool, constipation, diarrhea, nausea, rectal pain and vomiting. Endocrine: Negative. Genitourinary:        Ileostomy tube for urination   Musculoskeletal: Negative. Skin: Negative. Allergic/Immunologic: Negative. Neurological: Positive for weakness. Negative for dizziness, tremors, seizures, syncope, facial asymmetry, speech difficulty, light-headedness, numbness and headaches. Some generalized weakness but he feels stronger after his sessions with physical therapy   Hematological: Negative. Psychiatric/Behavioral: Negative for agitation, behavioral problems, confusion, decreased concentration, dysphoric mood, hallucinations, self-injury, sleep disturbance and suicidal ideas. The patient is not nervous/anxious and is not hyperactive.         Past Medical History:   Diagnosis Date   • Acute kidney injury ([address])    • Anemia Jan. 2022   • Arthritis     Hands   • Wright esophagus    • BPH with obstruction/lower urinary tract symptoms    • CAD (coronary artery disease)     Last assessed 09/16/15   • Cancer [facility])     bladder   • Cataract, acquired     Last assessed 10/10/17   • Chronic kidney disease    • Coronary artery disease    • Diabetes mellitus ([address])     NIDDM   • Diabetic neuropathy ([address])     Feet   • Enlarged prostate with lower urinary tract symptoms (LUTS)     Last assessed 10/10/17   • Erectile dysfunction    • GERD (gastroesophageal reflux disease)     Last assessed 10/10/17   • Hemoptysis     Last assessed 03/14/16   • Hypercholesterolemia     Last assessed 10/10/17   • Hypertension     Last assessed 10/10/17   • Kidney stone    • Macular degeneration     Right eye is particularly affected-peripheral vision intact   • Myocardial infarction [facility]) 1998   • OAB (overactive bladder)    • Testicular hypofunction    • Testicular hypogonadism     Last assessed 10/10/17   • Tinnitus    • Umbilical hernia     Last assessed 06/18/14   • Urge incontinence of urine    • Wears glasses      Past Surgical History:   Procedure Laterality Date   • BLADDER SURGERY     • COLONOSCOPY     • CORONARY ARTERY BYPASS GRAFT  07/16/2014    ABG x 4 LIMA to LAD,SVG to diagnoal 2 SVG to OM-1, SVG to PDA, resection of partial plerual mass   • CT GUIDED PERC DRAINAGE CATHETER PLACEMENT  02/19/2021   • CYSTOSCOPY  2013   • CYSTOSCOPY  02/08/2022    Olya   • ESOPHAGOGASTRODUODENOSCOPY     • FL RETROGRADE PYELOGRAM  12/20/2018   • FL RETROGRADE PYELOGRAM  12/05/2019   • INGUINAL HERNIA REPAIR Bilateral 2015   • IR DRAINAGE TUBE CHECK AND/OR REMOVAL  03/05/2021   • PHOTODYNAMIC THERAPY      For Barretts esophagus   • AZ COLONOSCOPY FLX DX W/COLLJ SPEC WHEN PFRMD N/A 04/25/2016    Procedure: COLONOSCOPY;  Surgeon: Luigi Miller MD;  Location: [facility];   Service: Gastroenterology • TX CYSTO W/REMOVAL OF LESIONS SMALL N/A 12/05/2019    Procedure: CYSTO W/TURBT AND TRANSURETHRAL PROSTATE BIOPSY AND OPENING OF BLADDER NECK CONTRACTURE, B/L Retrograde pyelogram;  Surgeon: Adan Dykes MD;  Location: [facility];  Service: Urology   • TX CYSTOURETHROSCOPY W/DEST &/RMVL MED BLADDER TY N/A 04/16/2020    Procedure: CYSTOSCOPY, TRANSURETHRAL RESECTION OF BLADDER TUMOR (TURBT);   Surgeon: Adan Dykes MD;  [facility];  Service: Urology   • TX CYSTOURETHROSCOPY WITH BIOPSY N/A 09/10/2020    Procedure: CYSTOSCOPY, COLLECTION OF LEFT KIDNEY CYTOLOGY, BILATERAL RETROGRADE PYELOGRAM, BLADDER WALL BIOPSIES  AND FULGERAION, RANDOM BLADDER TUMOR BIOPSIES;  Surgeon: Adan Dykes MD;  Location: [address] [place] [address] MAIN OR;  Service: Urology   • TX CYSTOURETHROSCOPY WITH BIOPSY N/A 04/05/2022    Procedure: urethroscopy , biopsy of urethra with fulgeration;  Surgeon: Jesus Gastelum MD;  Location:  MAIN OR;  Service: Urology   • PROSTATE SURGERY     • TONSILLECTOMY     • TRANSURETHRAL RESECTION OF PROSTATE N/A 12/20/2018    Procedure: CYSTO, PHOTO SELECTIVE VAPORIZATION OF PROSTATE, B/L RETROGRADE PYELOGRAM, TURBT;  Surgeon: Adan Dykes MD;  Location[facility];  Service: Urology   • UMBILICAL HERNIA REPAIR  2012   • UPPER GASTROINTESTINAL ENDOSCOPY       Family History   Problem Relation Age of Onset   • Cancer Mother         Topical oral abrasian caused cancer   • Other Mother         Digestive System Complications   • Diabetes Father    • Heart disease Father    • Hypertension Father    • Coronary artery disease Father    • Hyperlipidemia Father      Social History     Socioeconomic History   • Marital status: /Civil Union     Spouse name: None   • Number of children: None   • Years of education: None   • Highest education level: None   Occupational History   • Occupation: Sales position   Tobacco Use   • Smoking status: Former     Packs/day: 1.00     Years: 10.00     Total pack years: 10.00     Types: Cigarettes     Quit date: 1972     Years since quittin.5   • Smokeless tobacco: Never   • Tobacco comments:     Quit at age 28   Vaping Use   • Vaping Use: Never used   Substance and Sexual Activity   • Alcohol use: Not Currently     Alcohol/week: 2.0 standard drinks of alcohol     Types: 1 Glasses of wine, 1 Cans of beer per week     Comment: Non since 7/15/2020   • Drug use: Never   • Sexual activity: Not Currently     Partners: Female   Other Topics Concern   • None   Social History Narrative    Always uses seatbelt        Caffeine use- Drinks 2 cups of coffee daily     Social Determinants of Health     Financial Resource Strain: Low Risk  (2022)    Overall Financial Resource Strain (CARDIA)    • Difficulty of Paying Living Expenses: Not very hard   Food Insecurity: Not on file   Transportation Needs: No Transportation Needs (2022)    PRAPARE - Transportation    • Lack of Transportation (Medical): No    • Lack of Transportation (Non-Medical):  No   Physical Activity: Not on file   Stress: Not on file   Social Connections: Not on file   Intimate Partner Violence: Not on file   Housing Stability: Not on file     Current Outpatient Medications on File Prior to Visit   Medication Sig   • atorvastatin (LIPITOR) 40 mg tablet TAKE ONE TABLET BY MOUTH AT BEDTIME   • Blood Glucose Monitoring Suppl (OneTouch Verio Flex System) w/Device KIT Use to check blood sugars daily   • calcitriol (ROCALTROL) 0.25 mcg capsule Take 1 tablet 2 times a week (Monday, Friday)   • Cholecalciferol (VITAMIN D) 50 MCG (2000 UT) tablet Take 2,000 Units by mouth daily   • citalopram (CeleXA) 20 mg tablet TAKE ONE TABLET BY MOUTH EVERY DAY   • Farxiga 5 MG TABS TAKE ONE TABLET BY MOUTH EVERY DAY   • ferrous sulfate 324 (65 Fe) mg Take 1 tablet (324 mg total) by mouth daily   • Lancets (OneTouch Delica Plus [id number]) MISC TEST BLOOD GLUCOSE ONCE DAILY   • losartan (COZAAR) 25 mg tablet Take 25 mg by mouth daily   • magnesium oxide (MAG-OX) 400 mg Take 1 tablet (400 mg total) by mouth in the morning. • metoprolol tartrate (LOPRESSOR) 25 mg tablet Take 1 tablet (25 mg total) by mouth every 12 (twelve) hours   • Multiple Vitamins-Minerals (OCUVITE-LUTEIN PO) Take 1 tablet by mouth 2 (two) times a day Pt is taking preservision    • Omega-3 Fatty Acids (Fish Oil) 1200 MG CPDR Take by mouth in the morning   • omeprazole (PriLOSEC) 40 MG capsule TAKE ONE CAPSULE BY MOUTH EVERY MORNING   • ondansetron (ZOFRAN) 4 mg tablet Take 1 tablet (4 mg total) by mouth every 8 (eight) hours as needed for nausea or vomiting   • OneTouch Verio test strip TEST ONCE A DAY   • sodium bicarbonate 650 mg tablet Take 2 tablets (1,300 mg total) by mouth 2 (two) times a day   • torsemide (DEMADEX) 20 mg tablet Take 1 tablet (20 mg total) by mouth every other day   • traMADol (ULTRAM) 50 mg tablet TAKE ONE TABLET BY MOUTH EVERY 6 HOURS AS NEEDED FOR MODERATE PAIN     Allergies   Allergen Reactions   • Fentanyl Hallucinations   • Morphine And Related Other (See Comments)     While having an MI patient received morphine and had adverse reaction but doesn't know what happened. Immunization History   Administered Date(s) Administered   • COVID-19 MODERNA VACC 0.5 ML IM 01/18/2021, 02/15/2021   • Influenza Split High Dose Preservative Free IM 10/10/2013, 09/22/2014, 10/11/2016   • Influenza, high dose seasonal 0.7 mL 01/22/2019, 10/08/2019, 09/03/2020, 11/23/2021, 11/29/2022   • Pneumococcal Conjugate 13-Valent 05/23/2019   • Pneumococcal Polysaccharide PPV23 03/11/2014       Objective     /70   Pulse 69   Temp 98 °F (36.7 °C)   Wt 82.1 kg (181 lb)   SpO2 97%   BMI 28.35 kg/m²     Physical Exam  Vitals and nursing note reviewed. Constitutional:       General: He is not in acute distress. Appearance: Normal appearance. He is not ill-appearing, toxic-appearing or diaphoretic.       Comments: Pleasant 55-year-old male who is awake alert in no acute distress and oriented x3. Accompanied by his wife. Over the weight. Abdomen is slightly protuberant   HENT:      Head: Normocephalic and atraumatic. Right Ear: Tympanic membrane, ear canal and external ear normal. There is no impacted cerumen. Left Ear: Tympanic membrane, ear canal and external ear normal. There is no impacted cerumen. Nose: Nose normal. No congestion or rhinorrhea. Mouth/Throat:      Mouth: Mucous membranes are dry. Pharynx: Oropharynx is clear. No oropharyngeal exudate or posterior oropharyngeal erythema. Eyes:      General: No scleral icterus. Right eye: No discharge. Left eye: No discharge. Extraocular Movements: Extraocular movements intact. Conjunctiva/sclera: Conjunctivae normal.      Pupils: Pupils are equal, round, and reactive to light. Neck:      Vascular: No carotid bruit. Cardiovascular:      Rate and Rhythm: Normal rate and regular rhythm. Heart sounds: Normal heart sounds. No gallop. Pulmonary:      Effort: Pulmonary effort is normal. No respiratory distress. Breath sounds: No stridor. No wheezing, rhonchi or rales. Comments: Some decreased breath sounds anteriorly and posteriorly but no rales rhonchi or wheezes could be appreciated on exam  Chest:      Chest wall: No tenderness. Abdominal:      General: Bowel sounds are normal. There is distension. Palpations: Abdomen is soft. There is no mass. Tenderness: There is abdominal tenderness. There is no right CVA tenderness, left CVA tenderness, guarding or rebound. Hernia: No hernia is present. Comments: Abdomen is slightly protuberant, slight tenderness suprapubic region bilaterally and no rebound or rigidity   Musculoskeletal:         General: No swelling, tenderness, deformity or signs of injury. Cervical back: Normal range of motion and neck supple. No rigidity or tenderness.       Right lower leg: Edema present. Left lower leg: Edema present. Comments: Patient does have some slight muscle wasting upper and lower extremities. No acute changes on muscle skeletal exam.   Lymphadenopathy:      Cervical: No cervical adenopathy. Skin:     General: Skin is warm and dry. Coloration: Skin is not jaundiced or pale. Findings: No bruising, erythema, lesion or rash. Neurological:      Mental Status: He is alert and oriented to person, place, and time. Mental status is at baseline. Cranial Nerves: No cranial nerve deficit. Sensory: Sensory deficit present. Motor: No weakness. Coordination: Coordination normal.      Gait: Gait abnormal.      Deep Tendon Reflexes: Reflexes abnormal.      Comments: Residual findings from peripheral neuropathy, absent deep tendon reflexes. Patient has a wide-based gait and decreased sensation bilateral lower extremities stocking glove distribution   Psychiatric:         Mood and Affect: Mood normal.         Behavior: Behavior normal.         Thought Content:  Thought content normal.         Judgment: Judgment normal.       Sumaya Yoder DO

## 2023-07-18 NOTE — ASSESSMENT & PLAN NOTE
With the patient having difficulty with frequent falls he was sent and evaluated by physical therapy. Patient now has some increasing stability with gait. I did discuss with the patient using a cane with ambulation more to prevent a fall with any kind of significant injury.

## 2023-07-18 NOTE — ASSESSMENT & PLAN NOTE
Patient did have labs performed prior to the visit today his hemoglobin A1c is stable at 6.3. Patient's wife is keeping an eye on his dietary intake and patient is on medication as previously and continues to monitor his sugar levels at home. Having no episodes of hypoglycemia. Patient will continue present medication and surveillance and we will adjust medication in the future.   Lab Results   Component Value Date    HGBA1C 6.3 06/05/2023

## 2023-07-20 ENCOUNTER — HOSPITAL ENCOUNTER (OUTPATIENT)
Dept: INFUSION CENTER | Facility: HOSPITAL | Age: 83
Discharge: HOME | End: 2023-07-20
Attending: INTERNAL MEDICINE
Payer: MEDICARE

## 2023-07-20 DIAGNOSIS — C67.8 MALIGNANT NEOPLASM OF OVERLAPPING SITES OF BLADDER (HCC): ICD-10-CM

## 2023-07-20 DIAGNOSIS — C67.5 MALIGNANT NEOPLASM OF URINARY BLADDER NECK (HCC): Primary | ICD-10-CM

## 2023-07-20 PROCEDURE — 96413 CHEMO IV INFUSION 1 HR: CPT

## 2023-07-20 PROCEDURE — 96367 TX/PROPH/DG ADDL SEQ IV INF: CPT

## 2023-07-20 RX ORDER — ACETAMINOPHEN 325 MG/1
650 TABLET ORAL ONCE
Status: COMPLETED | OUTPATIENT
Start: 2023-07-20 | End: 2023-07-20

## 2023-07-20 RX ORDER — SODIUM CHLORIDE 9 MG/ML
20 INJECTION, SOLUTION INTRAVENOUS ONCE
Status: COMPLETED | OUTPATIENT
Start: 2023-07-20 | End: 2023-07-20

## 2023-07-20 RX ADMIN — AVELUMAB 800 MG: 20 INJECTION, SOLUTION, CONCENTRATE INTRAVENOUS at 14:29

## 2023-07-20 RX ADMIN — SODIUM CHLORIDE 20 ML/HR: 0.9 INJECTION, SOLUTION INTRAVENOUS at 13:34

## 2023-07-20 RX ADMIN — DIPHENHYDRAMINE HYDROCHLORIDE 25 MG: 50 INJECTION, SOLUTION INTRAMUSCULAR; INTRAVENOUS at 13:35

## 2023-07-20 RX ADMIN — ACETAMINOPHEN 650 MG: 325 TABLET ORAL at 13:35

## 2023-07-20 NOTE — PROGRESS NOTES
Bavencio given without incident. Patient given printed AVS.  Patient to return to department as scheduled.

## 2023-07-26 ENCOUNTER — OFFICE VISIT (OUTPATIENT)
Dept: PHYSICAL THERAPY | Facility: REHABILITATION | Age: 83
End: 2023-07-26
Payer: MEDICARE

## 2023-07-26 DIAGNOSIS — M25.552 LEFT HIP PAIN: Primary | ICD-10-CM

## 2023-07-26 PROCEDURE — 97110 THERAPEUTIC EXERCISES: CPT

## 2023-07-26 NOTE — PROGRESS NOTES
Daily Note     Today's date: 2023  Patient name: Leah Hunter  : 1940  MRN: [id number]  Referring provider: Marie Rodriguez*  Dx: No diagnosis found. Subjective: ***      Objective: See treatment diary below      Assessment: Tolerated treatment {Tolerated treatment :}.  Patient {assessment:}      Plan: {PLAN:}     Precautions: standard, hx of cancer, CHF      Manuals     Prone Hip ER/IR stretching with STM to glute  PRR           LAD   PRR Gr3-4 3rds PRR Gr3-4 3rds PRR Gr3-4 3rds PRR Gr3-4 3rds PRR Gr3-4 3rds PRN PRR Gr3-4 3rds    FOTO             Re-Assessment  20' 15'          Neuro Re-Ed                                                                                                        Ther Ex             Pt Edu, HEP, POC 10'            DKTC HEP reviewed           Repeated Motion Tesitng  20'           SLR Flexion       2x8 2x10     Std Hip Abd    Yb 2x10 ea Yb 2x10 ea Yb 2x10 ea Yb 2x10 ea Yb 2x10 ea Yb 2x10 ea    Std Hip Abd + Ext         Yb 2x10 ea    Hip Abd Stretch   PRR PRR PRR np resume PRR MB PRR    HERMELINDA Stretch w/ OP   PRR PRR PRR np resume PRR MB PRR    Bridges   2x10 yhb 2x10 yhb 2x10 rhb 2x10 rhb 2x15 rhb 2x15 rhb 2x10 bhb    Supine Clamshell   2x10 5" hold yhb 2x15 5" hold yhb 2x10 5" hold rhb 2x10 5" hold rhb 2x15 5" hold rhb 2x15 5" hold rhb 2x10 5" hold bhb    VG     DL L5 5' DL L7 5' DL L7 5' DL L7 5 DL L7 5 DL L7 5'    Ther Activity                                       Gait Training                                       Modalities

## 2023-07-26 NOTE — PROGRESS NOTES
Re-Evaluation/Daily Note     Today's date: 2023  Patient name: Ozzy Silva  : 1940  MRN: [id number]  Referring provider: Chan Roca*  Dx:   Encounter Diagnosis     ICD-10-CM    1. Left hip pain  M25.552           Start Time: 1400  Stop Time: 1435  Total time in clinic (min): 35 minutes    Subjective: Patient reports he has not needed to take any pain medications for a while. Patient denies any pain related to his left hip at this time. Patient states he is doing well. Patient reports he is feeling 95% improved at this time. Pain Levels:   0/10    Objective: See treatment diary below      Assessment:  Patient has met all goals established at IE. Patient is not limited by left hip pain at this time. Patient continues to be compliant with HEP for self-management of symptoms. Short Term Goals (Week 4): met all  1. Decreased pain by 50%  2. Improve ROM by 10 degrees   3. Improve strength by 1/2 measure      Long Term Goals (8 weeks): met all  1. Patient will reprot GROC >75%  2. Patient will exceed FOTO predicted outcome score  3. Patient will be fully independent with HEP by discharge  4. Patient will be able to manage symptoms independently. Plan: Discharge to HEP.       Precautions: standard, hx of cancer, CHF      Manuals    Prone Hip ER/IR stretching with STM to glute  PRR           LAD   PRR Gr3-4 3rds PRR Gr3-4 3rds PRR Gr3-4 3rds PRR Gr3-4 3rds PRR Gr3-4 3rds PRN PRR Gr3-4 3rds PRR Gr3-4 3rds   FOTO      55/57       Re-Assessment  20' 15'          Neuro Re-Ed                                                                                                        Ther Ex             Pt Edu, HEP, POC 10'            DKTC HEP reviewed           Repeated Motion Tesitng  20'           SLR Flexion       2x8 2x10     Std Hip Abd    Yb 2x10 ea Yb 2x10 ea Yb 2x10 ea Yb 2x10 ea Yb 2x10 ea Yb 2x10 ea Yb 2x10 ea   Std Hip Abd + Ext Yb 2x10 ea Yb 2x10 ea   Hip Abd Stretch   PRR PRR PRR np resume PRR MB PRR PRR   HERMELINDA Stretch w/ OP   PRR PRR PRR np resume PRR MB PRR PRR   Bridges   2x10 yhb 2x10 yhb 2x10 rhb 2x10 rhb 2x15 rhb 2x15 rhb 2x10 bhb 2x10 bhb   Supine Clamshell   2x10 5" hold yhb 2x15 5" hold yhb 2x10 5" hold rhb 2x10 5" hold rhb 2x15 5" hold rhb 2x15 5" hold rhb 2x10 5" hold bhb 2x10 5" hold bhb   VG     DL L5 5' DL L7 5' DL L7 5' DL L7 5 DL L7 5 DL L7 5' DL L7 5'   Ther Activity                                       Gait Training                                       Modalities

## 2023-07-27 RX ORDER — SODIUM CHLORIDE 9 MG/ML
20 INJECTION, SOLUTION INTRAVENOUS ONCE
Status: CANCELLED | OUTPATIENT
Start: 2023-08-03

## 2023-07-27 RX ORDER — ACETAMINOPHEN 325 MG/1
650 TABLET ORAL ONCE
Status: CANCELLED | OUTPATIENT
Start: 2023-08-03

## 2023-07-28 DIAGNOSIS — C67.8 MALIGNANT NEOPLASM OF OVERLAPPING SITES OF BLADDER (HCC): ICD-10-CM

## 2023-07-30 RX ORDER — TRAMADOL HYDROCHLORIDE 50 MG/1
TABLET ORAL
Qty: 20 TABLET | Refills: 0 | Status: SHIPPED | OUTPATIENT
Start: 2023-07-30

## 2023-08-01 ENCOUNTER — APPOINTMENT (OUTPATIENT)
Dept: LAB | Facility: CLINIC | Age: 83
End: 2023-08-01
Payer: MEDICARE

## 2023-08-01 DIAGNOSIS — D63.1 ANEMIA DUE TO STAGE 4 CHRONIC KIDNEY DISEASE (HCC): ICD-10-CM

## 2023-08-01 DIAGNOSIS — N18.4 ANEMIA DUE TO STAGE 4 CHRONIC KIDNEY DISEASE (HCC): ICD-10-CM

## 2023-08-01 DIAGNOSIS — C67.5 MALIGNANT NEOPLASM OF URINARY BLADDER NECK (HCC): ICD-10-CM

## 2023-08-01 DIAGNOSIS — C67.8 MALIGNANT NEOPLASM OF OVERLAPPING SITES OF BLADDER (HCC): ICD-10-CM

## 2023-08-01 DIAGNOSIS — R82.89 POSITIVE URINARY CYTOLOGY: ICD-10-CM

## 2023-08-01 DIAGNOSIS — D49.4 BLADDER TUMOR: ICD-10-CM

## 2023-08-01 LAB
ALBUMIN SERPL BCP-MCNC: 3 G/DL (ref 3.5–5)
ALP SERPL-CCNC: 87 U/L (ref 46–116)
ALT SERPL W P-5'-P-CCNC: 31 U/L (ref 12–78)
ANION GAP SERPL CALCULATED.3IONS-SCNC: 8 MMOL/L
AST SERPL W P-5'-P-CCNC: 15 U/L (ref 5–45)
BASOPHILS # BLD AUTO: 0.06 THOUSANDS/ÂΜL (ref 0–0.1)
BASOPHILS NFR BLD AUTO: 1 % (ref 0–1)
BILIRUB SERPL-MCNC: 0.53 MG/DL (ref 0.2–1)
BUN SERPL-MCNC: 57 MG/DL (ref 5–25)
CALCIUM ALBUM COR SERPL-MCNC: 10.4 MG/DL (ref 8.3–10.1)
CALCIUM SERPL-MCNC: 9.6 MG/DL (ref 8.3–10.1)
CHLORIDE SERPL-SCNC: 103 MMOL/L (ref 96–108)
CO2 SERPL-SCNC: 22 MMOL/L (ref 21–32)
CREAT SERPL-MCNC: 2.8 MG/DL (ref 0.6–1.3)
EOSINOPHIL # BLD AUTO: 0.56 THOUSAND/ÂΜL (ref 0–0.61)
EOSINOPHIL NFR BLD AUTO: 4 % (ref 0–6)
ERYTHROCYTE [DISTWIDTH] IN BLOOD BY AUTOMATED COUNT: 15.4 % (ref 11.6–15.1)
GFR SERPL CREATININE-BSD FRML MDRD: 19 ML/MIN/1.73SQ M
GLUCOSE P FAST SERPL-MCNC: 160 MG/DL (ref 65–99)
HCT VFR BLD AUTO: 36 % (ref 36.5–49.3)
HGB BLD-MCNC: 11.7 G/DL (ref 12–17)
IMM GRANULOCYTES # BLD AUTO: 0.14 THOUSAND/UL (ref 0–0.2)
IMM GRANULOCYTES NFR BLD AUTO: 1 % (ref 0–2)
LYMPHOCYTES # BLD AUTO: 0.83 THOUSANDS/ÂΜL (ref 0.6–4.47)
LYMPHOCYTES NFR BLD AUTO: 6 % (ref 14–44)
MCH RBC QN AUTO: 29.9 PG (ref 26.8–34.3)
MCHC RBC AUTO-ENTMCNC: 32.5 G/DL (ref 31.4–37.4)
MCV RBC AUTO: 92 FL (ref 82–98)
MONOCYTES # BLD AUTO: 1.39 THOUSAND/ÂΜL (ref 0.17–1.22)
MONOCYTES NFR BLD AUTO: 11 % (ref 4–12)
NEUTROPHILS # BLD AUTO: 10.28 THOUSANDS/ÂΜL (ref 1.85–7.62)
NEUTS SEG NFR BLD AUTO: 77 % (ref 43–75)
NRBC BLD AUTO-RTO: 0 /100 WBCS
PLATELET # BLD AUTO: 218 THOUSANDS/UL (ref 149–390)
PMV BLD AUTO: 11.4 FL (ref 8.9–12.7)
POTASSIUM SERPL-SCNC: 4.1 MMOL/L (ref 3.5–5.3)
PROT SERPL-MCNC: 7.7 G/DL (ref 6.4–8.4)
RBC # BLD AUTO: 3.91 MILLION/UL (ref 3.88–5.62)
SODIUM SERPL-SCNC: 133 MMOL/L (ref 135–147)
TSH SERPL DL<=0.05 MIU/L-ACNC: 2.06 UIU/ML (ref 0.45–4.5)
WBC # BLD AUTO: 13.26 THOUSAND/UL (ref 4.31–10.16)

## 2023-08-01 PROCEDURE — 80053 COMPREHEN METABOLIC PANEL: CPT

## 2023-08-01 PROCEDURE — 36415 COLL VENOUS BLD VENIPUNCTURE: CPT

## 2023-08-01 PROCEDURE — 85025 COMPLETE CBC W/AUTO DIFF WBC: CPT

## 2023-08-01 PROCEDURE — 84443 ASSAY THYROID STIM HORMONE: CPT

## 2023-08-02 ENCOUNTER — HOSPITAL ENCOUNTER (OUTPATIENT)
Dept: RADIOLOGY | Facility: HOSPITAL | Age: 83
Discharge: HOME | End: 2023-08-02
Attending: INTERNAL MEDICINE
Payer: MEDICARE

## 2023-08-02 DIAGNOSIS — C67.8 MALIGNANT NEOPLASM OF OVERLAPPING SITES OF BLADDER (HCC): ICD-10-CM

## 2023-08-02 PROCEDURE — 74176 CT ABD & PELVIS W/O CONTRAST: CPT

## 2023-08-02 PROCEDURE — G1004 CDSM NDSC: HCPCS

## 2023-08-03 ENCOUNTER — HOSPITAL ENCOUNTER (OUTPATIENT)
Dept: INFUSION CENTER | Facility: HOSPITAL | Age: 83
Discharge: HOME | End: 2023-08-03
Attending: INTERNAL MEDICINE
Payer: MEDICARE

## 2023-08-03 ENCOUNTER — TELEPHONE (OUTPATIENT)
Dept: INFUSION CENTER | Facility: HOSPITAL | Age: 83
End: 2023-08-03

## 2023-08-03 VITALS
TEMPERATURE: 98.1 F | HEART RATE: 66 BPM | BODY MASS INDEX: 28.04 KG/M2 | WEIGHT: 179.01 LBS | RESPIRATION RATE: 18 BRPM | OXYGEN SATURATION: 99 % | DIASTOLIC BLOOD PRESSURE: 77 MMHG | SYSTOLIC BLOOD PRESSURE: 153 MMHG

## 2023-08-03 DIAGNOSIS — C67.8 MALIGNANT NEOPLASM OF OVERLAPPING SITES OF BLADDER (HCC): ICD-10-CM

## 2023-08-03 DIAGNOSIS — C67.5 MALIGNANT NEOPLASM OF URINARY BLADDER NECK (HCC): Primary | ICD-10-CM

## 2023-08-03 PROCEDURE — 96367 TX/PROPH/DG ADDL SEQ IV INF: CPT

## 2023-08-03 PROCEDURE — 96413 CHEMO IV INFUSION 1 HR: CPT

## 2023-08-03 RX ORDER — SODIUM CHLORIDE 9 MG/ML
20 INJECTION, SOLUTION INTRAVENOUS ONCE
Status: COMPLETED | OUTPATIENT
Start: 2023-08-03 | End: 2023-08-03

## 2023-08-03 RX ORDER — ACETAMINOPHEN 325 MG/1
650 TABLET ORAL ONCE
Status: COMPLETED | OUTPATIENT
Start: 2023-08-03 | End: 2023-08-03

## 2023-08-03 RX ADMIN — AVELUMAB 800 MG: 20 INJECTION, SOLUTION, CONCENTRATE INTRAVENOUS at 14:13

## 2023-08-03 RX ADMIN — ACETAMINOPHEN 650 MG: 325 TABLET ORAL at 13:45

## 2023-08-03 RX ADMIN — SODIUM CHLORIDE 20 ML/HR: 0.9 INJECTION, SOLUTION INTRAVENOUS at 13:45

## 2023-08-03 RX ADMIN — DIPHENHYDRAMINE HYDROCHLORIDE 25 MG: 50 INJECTION, SOLUTION INTRAMUSCULAR; INTRAVENOUS at 13:44

## 2023-08-03 NOTE — PROGRESS NOTES
Pt tolerated Bavencio infusion without incident. Confirmed with the office no more cycles of bavencio at this time. Pt f/u with Dr. Robert Ybarra 8/8. Will schedule next appt for aranesp. AVS provided.

## 2023-08-03 NOTE — PLAN OF CARE
Problem: Potential for Falls  Goal: Patient will remain free of falls  Description: INTERVENTIONS:  - Educate patient/family on patient safety including physical limitations  - Instruct patient to call for assistance with activity   - Consult OT/PT to assist with strengthening/mobility   - Keep Call bell within reach  - Keep bed low and locked with side rails adjusted as appropriate  - Consider moving patient to room near nurses station  Outcome: Progressing

## 2023-08-07 ENCOUNTER — DOCUMENTATION (OUTPATIENT)
Dept: HEMATOLOGY ONCOLOGY | Facility: CLINIC | Age: 83
End: 2023-08-07

## 2023-08-07 DIAGNOSIS — C67.8 MALIGNANT NEOPLASM OF OVERLAPPING SITES OF BLADDER (HCC): ICD-10-CM

## 2023-08-07 DIAGNOSIS — C67.5 MALIGNANT NEOPLASM OF URINARY BLADDER NECK (HCC): ICD-10-CM

## 2023-08-07 DIAGNOSIS — N18.4 ANEMIA DUE TO STAGE 4 CHRONIC KIDNEY DISEASE (HCC): Primary | ICD-10-CM

## 2023-08-07 DIAGNOSIS — D63.1 ANEMIA DUE TO STAGE 4 CHRONIC KIDNEY DISEASE (HCC): Primary | ICD-10-CM

## 2023-08-07 DIAGNOSIS — D49.4 BLADDER TUMOR: ICD-10-CM

## 2023-08-07 DIAGNOSIS — R82.89 POSITIVE URINARY CYTOLOGY: ICD-10-CM

## 2023-08-07 NOTE — PROGRESS NOTES
Read requested for :    CT abdomen pelvis wo contrast (Order: [id number]) - 8/2/2023    Verbal confirmation from Jan.

## 2023-08-08 ENCOUNTER — TELEPHONE (OUTPATIENT)
Dept: HEMATOLOGY ONCOLOGY | Facility: CLINIC | Age: 83
End: 2023-08-08

## 2023-08-08 ENCOUNTER — OFFICE VISIT (OUTPATIENT)
Dept: HEMATOLOGY ONCOLOGY | Facility: CLINIC | Age: 83
End: 2023-08-08
Payer: MEDICARE

## 2023-08-08 VITALS
SYSTOLIC BLOOD PRESSURE: 138 MMHG | OXYGEN SATURATION: 93 % | DIASTOLIC BLOOD PRESSURE: 82 MMHG | HEART RATE: 74 BPM | BODY MASS INDEX: 28.56 KG/M2 | HEIGHT: 67 IN | WEIGHT: 182 LBS | TEMPERATURE: 97.3 F | RESPIRATION RATE: 17 BRPM

## 2023-08-08 DIAGNOSIS — C67.5 MALIGNANT NEOPLASM OF URINARY BLADDER NECK (HCC): ICD-10-CM

## 2023-08-08 DIAGNOSIS — C67.8 MALIGNANT NEOPLASM OF OVERLAPPING SITES OF BLADDER (HCC): ICD-10-CM

## 2023-08-08 DIAGNOSIS — R59.0 PELVIC LYMPHADENOPATHY: Primary | ICD-10-CM

## 2023-08-08 PROCEDURE — 99214 OFFICE O/P EST MOD 30 MIN: CPT | Performed by: INTERNAL MEDICINE

## 2023-08-08 RX ORDER — SODIUM CHLORIDE 9 MG/ML
20 INJECTION, SOLUTION INTRAVENOUS ONCE
OUTPATIENT
Start: 2023-08-24

## 2023-08-08 RX ORDER — ACETAMINOPHEN 325 MG/1
650 TABLET ORAL ONCE
OUTPATIENT
Start: 2023-08-24

## 2023-08-08 NOTE — PROGRESS NOTES
Hematology Outpatient Follow - Up Note  Myles Cerda 80 y.o. 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  Cystoscopy on 04/2020 showed carcinoma in situ no evidence of invasive component     He received Pembrolizumab 200 mg flat dose every 3 weeks on 05/26/2020. After 2 cycles he had grade 3 elevation of the liver enzymes, thickening of the skin of the forearms consistent with toxicity to Pembrolizumab     Status post cystoprostatectomy with urinary diversion to an ileal conduit on 10/26/2020        2. 5/2022 pet/ct-  recurrent disease in the right iliac, pelvic area with extension into the aortic bifurcation      Evaluated by Dr. Liborio [facility] of [place], urethectomy discussed but ultimately not a surgical candidate.      9/2022 pelvic MRI - Multiple suspicious and indeterminate left common, external, and internal iliac lymph nodes up to the aortoiliac bifurcation. Retroperitoneal lymphatic chain is not imaged in entirety. Indeterminate right common iliac lymph nodes. Suspicious lymph node or implant about the left obturator internus. Small pelvic peritoneal fluid of uncertain nature     Initiated on carboplatin/gemcitabine on 09/30/2022. CT scan on December 2022 showed no evidence of disease in the abdomen or pelvis     Therapy was complicated with anemia requiring dose reductions and Aranesp which had been beneficial.       3.  CKD.  Cr 2.2-3.3 since 1/2021.  Follows with Dr. Kevin Zayas.       4.  CT scan on March 2023 a showed mesenteric lymphadenopathy stable, right iliac lymphadenopathy stable, no new lesions this might be reactive versus residual disease. To distinguish;  PET scan 5/1/23 performed. 1.4 x 1.7 cm metabolically active lesion in the left pelvic sidewall (SUV max 5.0).  In directed retrospect, the lesion is similar in size compared to recent CT examinations dating back to 11/30/2022, but new from an older CT of   10/8/2021. Findings are suspicious for recurrence of disease     Previously noted 1 cm fluid-attenuating lesion in the right pelvic sidewall is not metabolically active on PET. Interval resolution of the previously noted mesenteric lymph node     Therapy changed to avelumab 800 mg flat dose every 2 weeks, proceed for a total of 3 months    CAT scan in July 2023 showed right iliac enlargement of the lymph node as well as aortocaval lymphadenopathy on the right side, we looked at the scan together, this lymph nodes in the size between 1.2-1.4 cm, will continue avelumab and will do PET scan in 2-month        Labs and imaging studies are reviewed by ordering provider once results are available. If there are findings that need immediate attention, you will be contacted when results available. Discussing results and the implication on your healthcare is best discussed in person at your follow-up visit.        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        Status post radical cystoprostatectomy to ileal conduit urinary diversion on 10/26/2020     Urethral Biopsy 4/5/22  A.  Urethra (biopsy):  - High grade, non-invasive papillary urothelial carcinoma   - No muscularis propria identified     9/13/22 CT scan abdomen at [facility]- Few mildly enlarged mesenteric and retroperitoneal lymph nodes.  Scattered small nodules at the lung bases, favored to be benign.     Possibility of urethrectomy discussed.  Ultimately, he was not a candidate.     Represented to our office 9/20/22.       9/30/22 Carboplatin AUC 4 day 1, gemcitabine 1000 mg per m2 on days 1 and 8 every 21 days initiated.       Treatment changed to gemcitabine 800 mg per m2, carboplatin AUC 4 q 2 weeks with cycle 5 1/19/23  complicated with anemia requiring holding chemotherapy in December 2022      CAT scan in December 2022 showed no evidence of lymphadenopathy in the abdomen or pelvis     He continued gemcitabine/carboplatin and Aranesp 200 mcg every other week for anemia induced by chemotherapy; finished a total of 6 cycles     CAT scan in March 2023 showed stable mesenteric lymphadenopathy, right iliac lymphadenopathy might be reactive or consistent with metastatic disease     PET scan 1/5/2023 showed 1.7 x 1.4 cm metabolically active lesion in the left pelvic sidewall similar in size complaint to the previous CAT scan dated 1/11/2022 but the new from the old of CAT scans on 10/1/2021 suspicious for recurrence of disease     He is being treated with avelumab 800 mg flat dose every 2 weeks           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 History of recurrent superficial bladder cancer, with eventual cystoprostatectomy and urinary diversion with ileal conduit. The patient continues to be maintained on immunotherapy. Assess response to treatment. COMPARISON: PET/CT, dated 5/1/2023. CT, dated 3/20/2023. CT, dated 11/30/2022. TECHNIQUE:  CT examination of the abdomen and pelvis was performed without intravenous contrast. Multiplanar 2D reformatted images were created from the source data. This examination, like all CT scans performed in the [facility], was performed utilizing techniques to minimize radiation dose exposure, including the use of iterative reconstruction and automated exposure control. Radiation dose length product (DLP) for this visit:  490.47 mGy-cm Enteric contrast was administered. FINDINGS: ABDOMEN LOWER CHEST: No concerning pulmonary nodules. Cardiac enlargement. LIVER/BILIARY TREE:  Unremarkable. GALLBLADDER: There are gallstone(s) within the gallbladder, without pericholecystic inflammatory changes. SPLEEN:  Unremarkable. PANCREAS:  Unremarkable. ADRENAL GLANDS:  Unremarkable. KIDNEYS/URETERS:  Unremarkable. No hydronephrosis. STOMACH AND BOWEL: Mild sigmoid diverticulosis. Remainder of colon is normal. Ileal conduit anastomosis is unchanged. Remainder of small bowel is normal in caliber. Stomach is normal. Hiatal hernia. APPENDIX:  No findings to suggest appendicitis. ABDOMINOPELVIC CAVITY: Limited study without contrast. Within these confines, there appears to be interval enlargement in a right common iliac chain lymph node, now measuring 10 mm in short axis (series 2, image 110 and series 601, image 70). This lymph node was not enlarged on the study in March.  There was no FDG avidity above baseline blood pool on the study in May. Redemonstrated cystic lesion approximating the right pelvic sidewall next to surgical clips, mildly enlarged since the study in March, now measuring 16 mm (series 2, image 136), previously without FDG avidity, likely representing a small lymphocele. Redemonstrated soft tissue nodularity approximating the caudal left pelvic sidewall, identical in appearance when compared to the previous CT in March, and previously with FDG avidity. Mild interval enlargement in the 2 previous enlarged mesenteric lymph nodes. The more posterior one now measures 17 mm (series 2, image 96), previously 14 mm, while the more anterior one measures 17 mm (series 2, image 96), previously 13 mm. These were previously without FDG avidity above baseline. Unchanged mild associated mesenteric infiltration. VESSELS: Atherosclerotic changes are present. No evidence of aneurysm. PELVIS REPRODUCTIVE ORGANS: Cystoprostatectomy. Resection field is unchanged. URINARY BLADDER: Cystoprostatectomy. ABDOMINAL WALL/INGUINAL REGIONS:  Unremarkable. OSSEOUS STRUCTURES:  No acute fracture or destructive osseous lesion. Impression: 1. Limited study without intravenous contrast. Within these confines, there is interval enlargement in a right common iliac chain lymph node, now measuring 12 mm in short axis. This region was not enlarged on a prior CT from March, nor was there FDG avidity above baseline on the PET/CT from May. 2.  Additional interval mild enlargement of mesenteric lymph nodes, noting absence of FDG avidity of these nodes on the prior PET/CT. 3.  The region of prior FDG avidity involving the caudal left pelvic sidewall is unchanged in unenhanced CT appearance. The study was marked in EPIC for significant notification.  Workstation performed: [id number]       Labs:   Lab Results   Component Value Date    WBC 13.26 (H) 08/01/2023    HGB 11.7 (L) 08/01/2023    HCT 36.0 (L) 08/01/2023    MCV 92 08/01/2023     08/01/2023     Lab Results   Component Value Date     08/13/2015    K 4.1 08/01/2023     08/01/2023    CO2 22 08/01/2023    ANIONGAP 10 08/13/2015    BUN 57 (H) 08/01/2023    CREATININE 2.80 (H) 08/01/2023    GLUCOSE 128 08/13/2015    GLUF 160 (H) 08/01/2023    CALCIUM 9.6 08/01/2023    CORRECTEDCA 10.4 (H) 08/01/2023    AST 15 08/01/2023    ALT 31 08/01/2023    ALKPHOS 87 08/01/2023    PROT 6.8 08/13/2015    BILITOT 0.58 08/13/2015    EGFR 19 08/01/2023       Lab Results   Component Value Date    IRON 70 02/14/2023    TIBC 285 02/14/2023    FERRITIN 242 02/14/2023       Lab Results   Component Value Date    PYBSDKHZ79 666 08/13/2015         ROS: Review of Systems   Constitutional: Negative. Negative for appetite change, chills, diaphoresis, fatigue, fever and unexpected weight change. HENT:   Negative for hearing loss, lump/mass, mouth sores, nosebleeds, sore throat, trouble swallowing and voice change. Eyes: Negative. Negative for eye problems and icterus. Respiratory: Negative. Negative for chest tightness, cough, hemoptysis and shortness of breath. Cardiovascular: Negative for chest pain and leg swelling. Gastrointestinal: Negative for abdominal distention, abdominal pain, blood in stool, constipation, diarrhea and nausea. Endocrine: Negative. Genitourinary: Negative for dysuria, frequency, hematuria and pelvic pain. Musculoskeletal: Negative. Negative for arthralgias, back pain, flank pain, gait problem, myalgias and neck stiffness. Skin: Negative for itching and rash. Neurological: Negative for dizziness, gait problem, headaches, light-headedness, numbness and speech difficulty. Hematological: Negative for adenopathy. Does not bruise/bleed easily. Psychiatric/Behavioral: Negative for confusion, decreased concentration, depression and sleep disturbance. The patient is not nervous/anxious.            Current Medications: Reviewed  Allergies: Reviewed  PMH/FH/SH:  Reviewed      Physical Exam:    Body surface area is 1.94 meters squared. Wt Readings from Last 3 Encounters:   23 82.6 kg (182 lb)   23 81.2 kg (179 lb 0.2 oz)   23 82.1 kg (181 lb)        Temp Readings from Last 3 Encounters:   23 (!) 97.3 °F (36.3 °C)   23 98.1 °F (36.7 °C) (Temporal)   23 98 °F (36.7 °C)        BP Readings from Last 3 Encounters:   23 138/82   23 153/77   23 128/70         Pulse Readings from Last 3 Encounters:   23 74   23 66   23 69        Physical Exam  Vitals reviewed. Constitutional:       General: He is not in acute distress. Appearance: He is well-developed. He is not diaphoretic. HENT:      Head: Normocephalic and atraumatic. Eyes:      Conjunctiva/sclera: Conjunctivae normal.      Pupils: Pupils are equal, round, and reactive to light. Neck:      Trachea: No tracheal deviation. Cardiovascular:      Rate and Rhythm: Normal rate and regular rhythm. Heart sounds: No murmur heard. No friction rub. No gallop. Pulmonary:      Effort: Pulmonary effort is normal. No respiratory distress. Breath sounds: Normal breath sounds. No wheezing or rales. Chest:      Chest wall: No tenderness. Abdominal:      General: There is no distension. Palpations: Abdomen is soft. Tenderness: There is no abdominal tenderness. Musculoskeletal:      Cervical back: Normal range of motion and neck supple. Lymphadenopathy:      Cervical: No cervical adenopathy. Skin:     General: Skin is warm and dry. Coloration: Skin is not pale. Findings: No erythema or rash. Neurological:      Mental Status: He is alert and oriented to person, place, and time. Psychiatric:         Behavior: Behavior normal.         Thought Content: Thought content normal.         Judgment: Judgment normal.         ECO  Goals and Barriers:  Current Goal: Minimize effects of disease. Barriers: None. Patient's Capacity to Self Care:  Patient is able to self care.     Code Status: [unfilled]

## 2023-08-09 ENCOUNTER — TELEPHONE (OUTPATIENT)
Dept: INFUSION CENTER | Facility: HOSPITAL | Age: 83
End: 2023-08-09

## 2023-08-09 DIAGNOSIS — C67.5 MALIGNANT NEOPLASM OF URINARY BLADDER NECK (HCC): ICD-10-CM

## 2023-08-09 DIAGNOSIS — E11.59 TYPE 2 DIABETES MELLITUS WITH OTHER CIRCULATORY COMPLICATION, WITHOUT LONG-TERM CURRENT USE OF INSULIN (HCC): ICD-10-CM

## 2023-08-09 DIAGNOSIS — N18.4 ANEMIA DUE TO STAGE 4 CHRONIC KIDNEY DISEASE (HCC): Primary | ICD-10-CM

## 2023-08-09 DIAGNOSIS — D49.4 BLADDER TUMOR: ICD-10-CM

## 2023-08-09 DIAGNOSIS — R82.89 POSITIVE URINARY CYTOLOGY: ICD-10-CM

## 2023-08-09 DIAGNOSIS — D63.1 ANEMIA DUE TO STAGE 4 CHRONIC KIDNEY DISEASE (HCC): Primary | ICD-10-CM

## 2023-08-09 DIAGNOSIS — C67.8 MALIGNANT NEOPLASM OF OVERLAPPING SITES OF BLADDER (HCC): ICD-10-CM

## 2023-08-09 RX ORDER — BLOOD SUGAR DIAGNOSTIC
STRIP MISCELLANEOUS
Qty: 100 STRIP | Refills: 0 | Status: SHIPPED | OUTPATIENT
Start: 2023-08-09

## 2023-08-09 RX ORDER — LANCETS 33 GAUGE
EACH MISCELLANEOUS
Qty: 100 EACH | Refills: 0 | Status: SHIPPED | OUTPATIENT
Start: 2023-08-09

## 2023-08-09 NOTE — TELEPHONE ENCOUNTER
Called pt started to go over his schedule with him and if he wouldn't mind going to another site if he only wanted [facility] we can start treatment 8/24 he said that was good. Pt then said he will have to call back. I will schedule the 8/24 to at least have him on schedule and wait for his call back

## 2023-08-15 ENCOUNTER — LAB (OUTPATIENT)
Dept: LAB | Facility: CLINIC | Age: 83
End: 2023-08-15
Payer: MEDICARE

## 2023-08-15 DIAGNOSIS — C67.5 MALIGNANT NEOPLASM OF URINARY BLADDER NECK (HCC): ICD-10-CM

## 2023-08-15 DIAGNOSIS — C67.8 MALIGNANT NEOPLASM OF OVERLAPPING SITES OF BLADDER (HCC): ICD-10-CM

## 2023-08-15 DIAGNOSIS — N18.4 STAGE 4 CHRONIC KIDNEY DISEASE (HCC): ICD-10-CM

## 2023-08-15 DIAGNOSIS — D63.1 ANEMIA DUE TO STAGE 4 CHRONIC KIDNEY DISEASE (HCC): ICD-10-CM

## 2023-08-15 DIAGNOSIS — N25.81 SECONDARY HYPERPARATHYROIDISM OF RENAL ORIGIN (HCC): ICD-10-CM

## 2023-08-15 DIAGNOSIS — N18.4 BENIGN HYPERTENSION WITH CHRONIC KIDNEY DISEASE, STAGE IV (HCC): ICD-10-CM

## 2023-08-15 DIAGNOSIS — N18.4 ANEMIA DUE TO STAGE 4 CHRONIC KIDNEY DISEASE (HCC): ICD-10-CM

## 2023-08-15 DIAGNOSIS — I12.9 BENIGN HYPERTENSION WITH CHRONIC KIDNEY DISEASE, STAGE IV (HCC): ICD-10-CM

## 2023-08-15 DIAGNOSIS — R80.1 PERSISTENT PROTEINURIA: ICD-10-CM

## 2023-08-15 DIAGNOSIS — R82.89 POSITIVE URINARY CYTOLOGY: ICD-10-CM

## 2023-08-15 DIAGNOSIS — D49.4 BLADDER TUMOR: ICD-10-CM

## 2023-08-15 LAB
ALBUMIN SERPL BCP-MCNC: 3 G/DL (ref 3.5–5)
ALP SERPL-CCNC: 86 U/L (ref 46–116)
ALT SERPL W P-5'-P-CCNC: 17 U/L (ref 12–78)
ANION GAP SERPL CALCULATED.3IONS-SCNC: 6 MMOL/L
AST SERPL W P-5'-P-CCNC: 9 U/L (ref 5–45)
BASOPHILS # BLD AUTO: 0.04 THOUSANDS/ÂΜL (ref 0–0.1)
BASOPHILS NFR BLD AUTO: 0 % (ref 0–1)
BILIRUB SERPL-MCNC: 0.55 MG/DL (ref 0.2–1)
BUN SERPL-MCNC: 60 MG/DL (ref 5–25)
CALCIUM ALBUM COR SERPL-MCNC: 10.2 MG/DL (ref 8.3–10.1)
CALCIUM SERPL-MCNC: 9.4 MG/DL (ref 8.3–10.1)
CHLORIDE SERPL-SCNC: 107 MMOL/L (ref 96–108)
CO2 SERPL-SCNC: 23 MMOL/L (ref 21–32)
CREAT SERPL-MCNC: 2.82 MG/DL (ref 0.6–1.3)
CREAT UR-MCNC: 59.7 MG/DL
EOSINOPHIL # BLD AUTO: 0.21 THOUSAND/ÂΜL (ref 0–0.61)
EOSINOPHIL NFR BLD AUTO: 2 % (ref 0–6)
ERYTHROCYTE [DISTWIDTH] IN BLOOD BY AUTOMATED COUNT: 16.7 % (ref 11.6–15.1)
GFR SERPL CREATININE-BSD FRML MDRD: 19 ML/MIN/1.73SQ M
GLUCOSE SERPL-MCNC: 150 MG/DL (ref 65–140)
HCT VFR BLD AUTO: 36.4 % (ref 36.5–49.3)
HGB BLD-MCNC: 11.3 G/DL (ref 12–17)
IMM GRANULOCYTES # BLD AUTO: 0.09 THOUSAND/UL (ref 0–0.2)
IMM GRANULOCYTES NFR BLD AUTO: 1 % (ref 0–2)
LYMPHOCYTES # BLD AUTO: 1 THOUSANDS/ÂΜL (ref 0.6–4.47)
LYMPHOCYTES NFR BLD AUTO: 8 % (ref 14–44)
MAGNESIUM SERPL-MCNC: 2.5 MG/DL (ref 1.6–2.6)
MCH RBC QN AUTO: 29.7 PG (ref 26.8–34.3)
MCHC RBC AUTO-ENTMCNC: 31 G/DL (ref 31.4–37.4)
MCV RBC AUTO: 96 FL (ref 82–98)
MONOCYTES # BLD AUTO: 1.05 THOUSAND/ÂΜL (ref 0.17–1.22)
MONOCYTES NFR BLD AUTO: 8 % (ref 4–12)
NEUTROPHILS # BLD AUTO: 10.27 THOUSANDS/ÂΜL (ref 1.85–7.62)
NEUTS SEG NFR BLD AUTO: 81 % (ref 43–75)
NRBC BLD AUTO-RTO: 0 /100 WBCS
PHOSPHATE SERPL-MCNC: 2.7 MG/DL (ref 2.3–4.1)
PLATELET # BLD AUTO: 207 THOUSANDS/UL (ref 149–390)
PMV BLD AUTO: 12.4 FL (ref 8.9–12.7)
POTASSIUM SERPL-SCNC: 4.6 MMOL/L (ref 3.5–5.3)
PROT SERPL-MCNC: 7.4 G/DL (ref 6.4–8.4)
PROT UR-MCNC: 60 MG/DL
PROT/CREAT UR: 1.01 MG/G{CREAT} (ref 0–0.1)
PTH-INTACT SERPL-MCNC: 66.9 PG/ML (ref 12–88)
RBC # BLD AUTO: 3.8 MILLION/UL (ref 3.88–5.62)
SODIUM SERPL-SCNC: 136 MMOL/L (ref 135–147)
T3FREE SERPL-MCNC: 2.85 PG/ML (ref 2.5–3.9)
TSH SERPL DL<=0.05 MIU/L-ACNC: 2.26 UIU/ML (ref 0.45–4.5)
WBC # BLD AUTO: 12.66 THOUSAND/UL (ref 4.31–10.16)

## 2023-08-15 PROCEDURE — 84443 ASSAY THYROID STIM HORMONE: CPT

## 2023-08-15 PROCEDURE — 36415 COLL VENOUS BLD VENIPUNCTURE: CPT

## 2023-08-15 PROCEDURE — 80053 COMPREHEN METABOLIC PANEL: CPT

## 2023-08-15 PROCEDURE — 83970 ASSAY OF PARATHORMONE: CPT

## 2023-08-15 PROCEDURE — 84156 ASSAY OF PROTEIN URINE: CPT

## 2023-08-15 PROCEDURE — 83735 ASSAY OF MAGNESIUM: CPT

## 2023-08-15 PROCEDURE — 84481 FREE ASSAY (FT-3): CPT

## 2023-08-15 PROCEDURE — 82570 ASSAY OF URINE CREATININE: CPT

## 2023-08-15 PROCEDURE — 85025 COMPLETE CBC W/AUTO DIFF WBC: CPT

## 2023-08-15 PROCEDURE — 84100 ASSAY OF PHOSPHORUS: CPT

## 2023-08-16 ENCOUNTER — TELEPHONE (OUTPATIENT)
Dept: NEPHROLOGY | Facility: CLINIC | Age: 83
End: 2023-08-16

## 2023-08-16 NOTE — RESULT ENCOUNTER NOTE
Please inform patient that renal function is stable at creatinine 2.82.  urine protein stable, continue same treatment

## 2023-08-16 NOTE — TELEPHONE ENCOUNTER
----- Message from Adeel Baker MD sent at 8/16/2023 11:10 AM EDT -----  Please inform patient that renal function is stable at creatinine 2.82.  urine protein stable, continue same treatment

## 2023-08-23 ENCOUNTER — APPOINTMENT (OUTPATIENT)
Dept: LAB | Facility: CLINIC | Age: 83
End: 2023-08-23
Payer: MEDICARE

## 2023-08-23 ENCOUNTER — TELEPHONE (OUTPATIENT)
Dept: INFUSION CENTER | Facility: HOSPITAL | Age: 83
End: 2023-08-23

## 2023-08-23 DIAGNOSIS — C67.5 MALIGNANT NEOPLASM OF URINARY BLADDER NECK (HCC): ICD-10-CM

## 2023-08-23 DIAGNOSIS — C67.5 MALIGNANT NEOPLASM OF URINARY BLADDER NECK (HCC): Primary | ICD-10-CM

## 2023-08-23 DIAGNOSIS — C67.8 MALIGNANT NEOPLASM OF OVERLAPPING SITES OF BLADDER (HCC): ICD-10-CM

## 2023-08-23 LAB
ALBUMIN SERPL BCP-MCNC: 3.8 G/DL (ref 3.5–5)
ALP SERPL-CCNC: 81 U/L (ref 34–104)
ALT SERPL W P-5'-P-CCNC: 12 U/L (ref 7–52)
ANION GAP SERPL CALCULATED.3IONS-SCNC: 12 MMOL/L
AST SERPL W P-5'-P-CCNC: 14 U/L (ref 13–39)
BASOPHILS # BLD AUTO: 0.05 THOUSANDS/ÂΜL (ref 0–0.1)
BASOPHILS NFR BLD AUTO: 1 % (ref 0–1)
BILIRUB SERPL-MCNC: 0.55 MG/DL (ref 0.2–1)
BUN SERPL-MCNC: 50 MG/DL (ref 5–25)
CALCIUM SERPL-MCNC: 9.9 MG/DL (ref 8.4–10.2)
CHLORIDE SERPL-SCNC: 98 MMOL/L (ref 96–108)
CO2 SERPL-SCNC: 25 MMOL/L (ref 21–32)
CREAT SERPL-MCNC: 2.58 MG/DL (ref 0.6–1.3)
EOSINOPHIL # BLD AUTO: 0.13 THOUSAND/ÂΜL (ref 0–0.61)
EOSINOPHIL NFR BLD AUTO: 1 % (ref 0–6)
ERYTHROCYTE [DISTWIDTH] IN BLOOD BY AUTOMATED COUNT: 16.8 % (ref 11.6–15.1)
GFR SERPL CREATININE-BSD FRML MDRD: 22 ML/MIN/1.73SQ M
GLUCOSE SERPL-MCNC: 120 MG/DL (ref 65–140)
HCT VFR BLD AUTO: 38.3 % (ref 36.5–49.3)
HGB BLD-MCNC: 12.6 G/DL (ref 12–17)
IMM GRANULOCYTES # BLD AUTO: 0.07 THOUSAND/UL (ref 0–0.2)
IMM GRANULOCYTES NFR BLD AUTO: 1 % (ref 0–2)
LYMPHOCYTES # BLD AUTO: 1.06 THOUSANDS/ÂΜL (ref 0.6–4.47)
LYMPHOCYTES NFR BLD AUTO: 10 % (ref 14–44)
MCH RBC QN AUTO: 30.5 PG (ref 26.8–34.3)
MCHC RBC AUTO-ENTMCNC: 32.9 G/DL (ref 31.4–37.4)
MCV RBC AUTO: 93 FL (ref 82–98)
MONOCYTES # BLD AUTO: 1.25 THOUSAND/ÂΜL (ref 0.17–1.22)
MONOCYTES NFR BLD AUTO: 12 % (ref 4–12)
NEUTROPHILS # BLD AUTO: 8.34 THOUSANDS/ÂΜL (ref 1.85–7.62)
NEUTS SEG NFR BLD AUTO: 75 % (ref 43–75)
NRBC BLD AUTO-RTO: 0 /100 WBCS
PLATELET # BLD AUTO: 208 THOUSANDS/UL (ref 149–390)
PMV BLD AUTO: 12.1 FL (ref 8.9–12.7)
POTASSIUM SERPL-SCNC: 3.9 MMOL/L (ref 3.5–5.3)
PROT SERPL-MCNC: 7.4 G/DL (ref 6.4–8.4)
RBC # BLD AUTO: 4.13 MILLION/UL (ref 3.88–5.62)
SODIUM SERPL-SCNC: 135 MMOL/L (ref 135–147)
WBC # BLD AUTO: 10.9 THOUSAND/UL (ref 4.31–10.16)

## 2023-08-23 PROCEDURE — 36415 COLL VENOUS BLD VENIPUNCTURE: CPT

## 2023-08-23 PROCEDURE — 85025 COMPLETE CBC W/AUTO DIFF WBC: CPT

## 2023-08-23 PROCEDURE — 80053 COMPREHEN METABOLIC PANEL: CPT

## 2023-08-23 NOTE — PROGRESS NOTES
Per Ryley Abbott RN, ok to use thyroid studies from 8/15 but pt needs to repeat CBCd and CMP prior to tx on 8/24. Pt called and made aware.

## 2023-08-23 NOTE — TELEPHONE ENCOUNTER
Called pt to let him know Dr. Narcisa Bob would like him to repeat his CBCd and CMP prior to his tx tomorrow. Pt said he would do his best to get labs done today.

## 2023-08-24 ENCOUNTER — HOSPITAL ENCOUNTER (OUTPATIENT)
Dept: INFUSION CENTER | Facility: HOSPITAL | Age: 83
Discharge: HOME | End: 2023-08-24
Attending: INTERNAL MEDICINE
Payer: MEDICARE

## 2023-08-24 VITALS
RESPIRATION RATE: 18 BRPM | HEART RATE: 72 BPM | SYSTOLIC BLOOD PRESSURE: 153 MMHG | DIASTOLIC BLOOD PRESSURE: 70 MMHG | TEMPERATURE: 97.5 F

## 2023-08-24 DIAGNOSIS — R82.89 POSITIVE URINARY CYTOLOGY: ICD-10-CM

## 2023-08-24 DIAGNOSIS — D49.4 BLADDER TUMOR: ICD-10-CM

## 2023-08-24 DIAGNOSIS — N18.4 ANEMIA DUE TO STAGE 4 CHRONIC KIDNEY DISEASE (HCC): ICD-10-CM

## 2023-08-24 DIAGNOSIS — C67.8 MALIGNANT NEOPLASM OF OVERLAPPING SITES OF BLADDER (HCC): ICD-10-CM

## 2023-08-24 DIAGNOSIS — D63.1 ANEMIA DUE TO STAGE 4 CHRONIC KIDNEY DISEASE (HCC): ICD-10-CM

## 2023-08-24 DIAGNOSIS — C67.5 MALIGNANT NEOPLASM OF URINARY BLADDER NECK (HCC): Primary | ICD-10-CM

## 2023-08-24 PROCEDURE — 96367 TX/PROPH/DG ADDL SEQ IV INF: CPT

## 2023-08-24 PROCEDURE — 96413 CHEMO IV INFUSION 1 HR: CPT

## 2023-08-24 RX ORDER — SODIUM CHLORIDE 9 MG/ML
20 INJECTION, SOLUTION INTRAVENOUS ONCE
Status: COMPLETED | OUTPATIENT
Start: 2023-08-24 | End: 2023-08-24

## 2023-08-24 RX ORDER — ACETAMINOPHEN 325 MG/1
650 TABLET ORAL ONCE
Status: COMPLETED | OUTPATIENT
Start: 2023-08-24 | End: 2023-08-24

## 2023-08-24 RX ADMIN — ACETAMINOPHEN 650 MG: 325 TABLET, FILM COATED ORAL at 11:06

## 2023-08-24 RX ADMIN — SODIUM CHLORIDE 20 ML/HR: 0.9 INJECTION, SOLUTION INTRAVENOUS at 11:06

## 2023-08-24 RX ADMIN — AVELUMAB 800 MG: 20 INJECTION, SOLUTION, CONCENTRATE INTRAVENOUS at 11:31

## 2023-08-24 RX ADMIN — DIPHENHYDRAMINE HYDROCHLORIDE 25 MG: 50 INJECTION, SOLUTION INTRAMUSCULAR; INTRAVENOUS at 11:06

## 2023-08-30 ENCOUNTER — OFFICE VISIT (OUTPATIENT)
Dept: VASCULAR SURGERY | Facility: CLINIC | Age: 83
End: 2023-08-30
Payer: MEDICARE

## 2023-08-30 VITALS
SYSTOLIC BLOOD PRESSURE: 148 MMHG | OXYGEN SATURATION: 98 % | WEIGHT: 186.6 LBS | BODY MASS INDEX: 29.29 KG/M2 | HEIGHT: 67 IN | DIASTOLIC BLOOD PRESSURE: 80 MMHG | HEART RATE: 67 BPM

## 2023-08-30 DIAGNOSIS — N18.4 STAGE 4 CHRONIC KIDNEY DISEASE (HCC): ICD-10-CM

## 2023-08-30 DIAGNOSIS — E78.2 MIXED HYPERLIPIDEMIA: ICD-10-CM

## 2023-08-30 DIAGNOSIS — C67.8 MALIGNANT NEOPLASM OF OVERLAPPING SITES OF BLADDER (HCC): Primary | ICD-10-CM

## 2023-08-30 PROCEDURE — 99213 OFFICE O/P EST LOW 20 MIN: CPT | Performed by: SURGERY

## 2023-08-30 NOTE — PROGRESS NOTES
f/u w/ Dr. Nuria Zavala on 9/26/23  -I think that this treatment should take precedent given his relatively stable CKD, dialysis does not seem imminent; I have also messaged Dr. Nuria Zavala to get an idea of what his treatment goals are and some idea of his life-expectancy to determine if he is likely to progress to ESRD during his lifetime    Mixed hyperlipidemia  -cont statin    Subjective:      Patient ID: Amena Hamilton is a 80 y.o. male. Patient is here today for a 3 month f/u exam. Patient had vein mapping done 5/7/2023. Patient has a history of bladder cancer. He states that he is still receiving cancer treatments and is not sure for how long they will continue. He is taking Atorvastatin. HPI:    Patient referred for HD access planning. Patient is CKD, not yet on HD. R-handed. Has pain in the left hip with walking. Uses a cane for ambulation. Walked about 53yrds today    Has CAD and hx of CABG '98. Denies any further cardiac interventions. Denies CP. Does minimal activity but no SOB. 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 He had a ureteral finding last year. Now w/ PET + pelvic wall lesion and is undergoing immunotherapy. Has 3 sessions remaining. PET planned for end of sept and office visit w/ Dr. Nuria Zavala to follow. Taking statin. The following portions of the patient's history were reviewed and updated as appropriate: allergies, current medications, past family history, past medical history, past social history, past surgical history and problem list.    Review of Systems   Constitutional: Negative. Negative for appetite change and unexpected weight change. HENT: Positive for rhinorrhea and sneezing. Eyes: Negative. Respiratory: Negative. Negative for shortness of breath. Cardiovascular: Negative.   Negative for chest pain.   Gastrointestinal: Negative. Endocrine: Negative. Genitourinary:        Ileal conduit   Musculoskeletal: Positive for arthralgias (L hip) and gait problem. Skin: Negative. Allergic/Immunologic: Negative. Hematological: Negative. Psychiatric/Behavioral: Negative. Objective:      /80 (BP Location: Left arm, Patient Position: Sitting, Cuff Size: Standard)   Pulse 67   Ht 5' 7" (1.702 m)   Wt 84.6 kg (186 lb 9.6 oz)   SpO2 98%   BMI 29.23 kg/m²          Physical Exam      I have reviewed and made appropriate changes to the review of systems input by the medical assistant.     Vitals:    08/30/23 1507   BP: 148/80   BP Location: Left arm   Patient Position: Sitting   Cuff Size: Standard   Pulse: 67   SpO2: 98%   Weight: 84.6 kg (186 lb 9.6 oz)   Height: 5' 7" (1.702 m)       Patient Active Problem List   Diagnosis   • Arteriosclerotic cardiovascular disease   • Backache   • DMII (diabetes mellitus, type 2) ([facility])   • Hyperlipidemia   • Wright's esophagus with esophagitis   • Overactive bladder   • Bladder tumor   • Type 2 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 ([facility])   • Hx of CABG   • Malignant neoplasm of overlapping sites of bladder (HCC)   • Closed fracture of upper end of right fibula   • History of bladder cancer   • Coronary artery disease involving native coronary artery of native heart without angina pectoris   • Ischemic cardiomyopathy   • Positive urinary cytology   • 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)   • Liver function study, abnormal   • Elevated troponin   • Forearm mass, left   • Fungal dermatitis   • Anxiety and depression   • Overweight   • Ambulatory dysfunction   • Frequent falls   • Right sided Pelvic abscess in male [facility])   • Severe protein-calorie malnutrition (HCC)   • Metabolic acidosis   • Hypomagnesemia   • RBBB   • Benign hypertension with chronic kidney disease, stage IV (HCC)   • Persistent proteinuria   • Anemia   • Chronic kidney disease-mineral and bone disorder   • Visual hallucinations   • Functional diarrhea   • Platelets decreased (HCC)   • Urethral tumor   • Secondary hyperparathyroidism of renal origin ([address])   • Stage 4 chronic kidney disease (HCC)   • Anemia due to stage 4 chronic kidney disease (HCC)   • Shortness of breath   • Chemotherapy-induced thrombocytopenia   • Rib pain on left side   • Left inguinal pain   • Chronic diastolic CHF (congestive heart failure) (HCC)   • Pelvic lymphadenopathy       Past Surgical History:   Procedure Laterality Date   • BLADDER SURGERY     • COLONOSCOPY     • CORONARY ARTERY BYPASS GRAFT  07/16/2014    ABG x 4 LIMA to LAD,SVG to diagnoal 2 SVG to OM-1, SVG to PDA, resection of partial plerual mass   • CT GUIDED PERC DRAINAGE CATHETER PLACEMENT  02/19/2021   • CYSTOSCOPY  2013   • CYSTOSCOPY  02/08/2022    [facility]   • ESOPHAGOGASTRODUODENOSCOPY     • FL RETROGRADE PYELOGRAM  12/20/2018   • FL RETROGRADE PYELOGRAM  12/05/2019   • INGUINAL HERNIA REPAIR Bilateral 2015   • IR DRAINAGE TUBE CHECK AND/OR REMOVAL  03/05/2021   • PHOTODYNAMIC THERAPY      For Barretts esophagus   • [place] COLONOSCOPY FLX DX W/COLLJ SPEC WHEN PFRMD N/A 04/25/2016    Procedure: COLONOSCOPY;  Surgeon: Presley Malhotra MD;  Location: [facility]; Service: Gastroenterology   • [place] CYSTO W/REMOVAL OF LESIONS SMALL N/A 12/05/2019    Procedure: CYSTO W/TURBT AND TRANSURETHRAL PROSTATE BIOPSY AND OPENING OF BLADDER NECK CONTRACTURE, B/L Retrograde pyelogram;  Surgeon: Nuvia Giang MD;  Location[facility];  Service: Urology   • [place] CYSTOURETHROSCOPY W/DEST &/RMVL MED BLADDER TY N/A 04/16/2020    Procedure: CYSTOSCOPY, TRANSURETHRAL RESECTION OF BLADDER TUMOR (TURBT);   Surgeon: Nuvia Giang MD;  [facility];  Service: Urology   • [place] CYSTOURETHROSCOPY WITH BIOPSY N/A 09/10/2020    Procedure: CYSTOSCOPY, COLLECTION OF LEFT KIDNEY CYTOLOGY, BILATERAL RETROGRADE PYELOGRAM, BLADDER WALL BIOPSIES  AND FULGERAION, RANDOM BLADDER TUMOR BIOPSIES;  Surgeon: Kirstin Frazier MD;  Location: [address] [place] [address] MAIN OR;  Service: Urology   • [place] CYSTOURETHROSCOPY WITH BIOPSY N/A 2022    Procedure: urethroscopy , biopsy of urethra with fulgeration;  Surgeon: Roula Russell MD;  Location:  MAIN OR;  Service: Urology   • PROSTATE SURGERY     • TONSILLECTOMY     • TRANSURETHRAL RESECTION OF PROSTATE N/A 2018    Procedure: CYSTO, PHOTO SELECTIVE VAPORIZATION OF PROSTATE, B/L RETROGRADE PYELOGRAM, TURBT;  Surgeon: Kirstin Frazier MD;  [facility];  Service: Urology   • UMBILICAL HERNIA REPAIR     • UPPER GASTROINTESTINAL ENDOSCOPY         Family History   Problem Relation Age of Onset   • Cancer Mother         Topical oral abrasian caused cancer   • Other Mother         Digestive System Complications   • Diabetes Father    • Heart disease Father    • Hypertension Father    • Coronary artery disease Father    • Hyperlipidemia Father        Social History     Socioeconomic History   • Marital status: /Civil Union     Spouse name: Not on file   • Number of children: Not on file   • Years of education: Not on file   • Highest education level: Not on file   Occupational History   • Occupation: Sales position   Tobacco Use   • Smoking status: Former     Packs/day: 1.00     Years: 10.00     Total pack years: 10.00     Types: Cigarettes     Quit date: 1972     Years since quittin.6   • Smokeless tobacco: Never   • Tobacco comments:     Quit at age 28   Vaping Use   • Vaping Use: Never used   Substance and Sexual Activity   • Alcohol use: Not Currently     Alcohol/week: 2.0 standard drinks of alcohol     Types: 1 Glasses of wine, 1 Cans of beer per week     Comment: Non since 7/15/2020   • Drug use: Never   • Sexual activity: Not Currently     Partners: Female   Other Topics Concern   • Not on file   Social History Narrative    Always uses seatbelt        Caffeine use- Drinks 2 cups of coffee daily     Social Determinants of Health     Financial Resource Strain: Low Risk  (11/29/2022)    Overall Financial Resource Strain (CARDIA)    • Difficulty of Paying Living Expenses: Not very hard   Food Insecurity: Not on file   Transportation Needs: No Transportation Needs (11/29/2022)    PRAPARE - Transportation    • Lack of Transportation (Medical): No    • Lack of Transportation (Non-Medical): No   Physical Activity: Not on file   Stress: Not on file   Social Connections: Not on file   Intimate Partner Violence: Not on file   Housing Stability: Not on file       Allergies   Allergen Reactions   • Fentanyl Hallucinations   • Morphine And Related Other (See Comments)     While having an MI patient received morphine and had adverse reaction but doesn't know what happened.          Current Outpatient Medications:   •  atorvastatin (LIPITOR) 40 mg tablet, TAKE ONE TABLET BY MOUTH AT BEDTIME, Disp: 90 tablet, Rfl: 3  •  Blood Glucose Monitoring Suppl (OneTouch Verio Flex System) w/Device KIT, Use to check blood sugars daily, Disp: 1 kit, Rfl: 0  •  calcitriol (ROCALTROL) 0.25 mcg capsule, Take 1 tablet 2 times a week (Monday, Friday), Disp: 24 capsule, Rfl: 3  •  Cholecalciferol (VITAMIN D) 50 MCG (2000 UT) tablet, Take 2,000 Units by mouth daily, Disp: , Rfl:   •  citalopram (CeleXA) 20 mg tablet, TAKE ONE TABLET BY MOUTH EVERY DAY, Disp: 30 tablet, Rfl: 3  •  Farxiga 5 MG TABS, TAKE ONE TABLET BY MOUTH EVERY DAY, Disp: 90 tablet, Rfl: 2  •  ferrous sulfate 324 (65 Fe) mg, Take 1 tablet (324 mg total) by mouth daily, Disp: 30 tablet, Rfl: 5  •  Lancets (OneTouch Delica Plus [id number]) MISC, TEST BLOOD GLUCOSE ONCE DAILY, Disp: 100 each, Rfl: 0  •  losartan (COZAAR) 25 mg tablet, Take 25 mg by mouth daily, Disp: , Rfl:   •  magnesium oxide (MAG-OX) 400 mg, Take 1 tablet (400 mg total) by mouth in the morning., Disp: 180 tablet, Rfl: 2  •  metoprolol tartrate (LOPRESSOR) 25 mg tablet, Take 1 tablet (25 mg total) by mouth every 12 (twelve) hours, Disp: 180 tablet, Rfl: 3  •  Multiple Vitamins-Minerals (OCUVITE-LUTEIN PO), Take 1 tablet by mouth 2 (two) times a day Pt is taking preservision , Disp: , Rfl:   •  omeprazole (PriLOSEC) 40 MG capsule, TAKE ONE CAPSULE BY MOUTH EVERY MORNING, Disp: 90 capsule, Rfl: 3  •  ondansetron (ZOFRAN) 4 mg tablet, Take 1 tablet (4 mg total) by mouth every 8 (eight) hours as needed for nausea or vomiting, Disp: 20 tablet, Rfl: 1  •  OneTouch Verio test strip, TEST ONCE A DAY, Disp: 100 strip, Rfl: 0  •  sodium bicarbonate 650 mg tablet, Take 2 tablets (1,300 mg total) by mouth 2 (two) times a day, Disp: 360 tablet, Rfl: 3  •  torsemide (DEMADEX) 20 mg tablet, Take 1 tablet (20 mg total) by mouth every other day, Disp: 90 tablet, Rfl: 0  •  traMADol (ULTRAM) 50 mg tablet, TAKE ONE TABLET BY MOUTH EVERY 6 HOURS AS NEEDED FOR MODERATE PAIN, Disp: 20 tablet, Rfl: 0  •  Omega-3 Fatty Acids (Fish Oil) 1200 MG CPDR, Take by mouth in the morning (Patient not taking: Reported on 8/8/2023), Disp: , Rfl:

## 2023-08-31 RX ORDER — ACETAMINOPHEN 325 MG/1
650 TABLET ORAL ONCE
OUTPATIENT
Start: 2023-09-21

## 2023-08-31 RX ORDER — ACETAMINOPHEN 325 MG/1
650 TABLET ORAL ONCE
Status: CANCELLED | OUTPATIENT
Start: 2023-09-07

## 2023-08-31 RX ORDER — SODIUM CHLORIDE 9 MG/ML
20 INJECTION, SOLUTION INTRAVENOUS ONCE
Status: CANCELLED | OUTPATIENT
Start: 2023-09-07

## 2023-08-31 RX ORDER — SODIUM CHLORIDE 9 MG/ML
20 INJECTION, SOLUTION INTRAVENOUS ONCE
OUTPATIENT
Start: 2023-09-21

## 2023-09-05 ENCOUNTER — APPOINTMENT (OUTPATIENT)
Dept: LAB | Facility: CLINIC | Age: 83
End: 2023-09-05
Payer: MEDICARE

## 2023-09-05 ENCOUNTER — OFFICE VISIT (OUTPATIENT)
Dept: NEPHROLOGY | Facility: CLINIC | Age: 83
End: 2023-09-05
Payer: MEDICARE

## 2023-09-05 VITALS
SYSTOLIC BLOOD PRESSURE: 114 MMHG | DIASTOLIC BLOOD PRESSURE: 62 MMHG | WEIGHT: 189 LBS | BODY MASS INDEX: 29.66 KG/M2 | HEIGHT: 67 IN

## 2023-09-05 DIAGNOSIS — N25.81 SECONDARY HYPERPARATHYROIDISM OF RENAL ORIGIN (HCC): ICD-10-CM

## 2023-09-05 DIAGNOSIS — C67.8 MALIGNANT NEOPLASM OF OVERLAPPING SITES OF BLADDER (HCC): ICD-10-CM

## 2023-09-05 DIAGNOSIS — N18.4 BENIGN HYPERTENSION WITH CHRONIC KIDNEY DISEASE, STAGE IV (HCC): ICD-10-CM

## 2023-09-05 DIAGNOSIS — E87.1 HYPONATREMIA: ICD-10-CM

## 2023-09-05 DIAGNOSIS — C67.5 MALIGNANT NEOPLASM OF URINARY BLADDER NECK (HCC): ICD-10-CM

## 2023-09-05 DIAGNOSIS — I50.32 CHRONIC DIASTOLIC CHF (CONGESTIVE HEART FAILURE) (HCC): ICD-10-CM

## 2023-09-05 DIAGNOSIS — I12.9 BENIGN HYPERTENSION WITH CHRONIC KIDNEY DISEASE, STAGE IV (HCC): ICD-10-CM

## 2023-09-05 DIAGNOSIS — N18.4 STAGE 4 CHRONIC KIDNEY DISEASE (HCC): Primary | ICD-10-CM

## 2023-09-05 DIAGNOSIS — N18.4 ANEMIA DUE TO STAGE 4 CHRONIC KIDNEY DISEASE (HCC): ICD-10-CM

## 2023-09-05 DIAGNOSIS — E87.20 METABOLIC ACIDOSIS: ICD-10-CM

## 2023-09-05 DIAGNOSIS — R80.1 PERSISTENT PROTEINURIA: ICD-10-CM

## 2023-09-05 DIAGNOSIS — D63.1 ANEMIA DUE TO STAGE 4 CHRONIC KIDNEY DISEASE (HCC): ICD-10-CM

## 2023-09-05 LAB
ALBUMIN SERPL BCP-MCNC: 3.4 G/DL (ref 3.5–5)
ALP SERPL-CCNC: 88 U/L (ref 34–104)
ALT SERPL W P-5'-P-CCNC: 10 U/L (ref 7–52)
ANION GAP SERPL CALCULATED.3IONS-SCNC: 8 MMOL/L
AST SERPL W P-5'-P-CCNC: 10 U/L (ref 13–39)
BASOPHILS # BLD AUTO: 0.04 THOUSANDS/ÂΜL (ref 0–0.1)
BASOPHILS NFR BLD AUTO: 0 % (ref 0–1)
BILIRUB SERPL-MCNC: 0.58 MG/DL (ref 0.2–1)
BUN SERPL-MCNC: 45 MG/DL (ref 5–25)
CALCIUM ALBUM COR SERPL-MCNC: 9.2 MG/DL (ref 8.3–10.1)
CALCIUM SERPL-MCNC: 8.7 MG/DL (ref 8.4–10.2)
CHLORIDE SERPL-SCNC: 101 MMOL/L (ref 96–108)
CO2 SERPL-SCNC: 25 MMOL/L (ref 21–32)
CREAT SERPL-MCNC: 2.5 MG/DL (ref 0.6–1.3)
EOSINOPHIL # BLD AUTO: 0.2 THOUSAND/ÂΜL (ref 0–0.61)
EOSINOPHIL NFR BLD AUTO: 2 % (ref 0–6)
ERYTHROCYTE [DISTWIDTH] IN BLOOD BY AUTOMATED COUNT: 17 % (ref 11.6–15.1)
GFR SERPL CREATININE-BSD FRML MDRD: 22 ML/MIN/1.73SQ M
GLUCOSE SERPL-MCNC: 194 MG/DL (ref 65–140)
HCT VFR BLD AUTO: 35.3 % (ref 36.5–49.3)
HGB BLD-MCNC: 11 G/DL (ref 12–17)
IMM GRANULOCYTES # BLD AUTO: 0.07 THOUSAND/UL (ref 0–0.2)
IMM GRANULOCYTES NFR BLD AUTO: 1 % (ref 0–2)
LYMPHOCYTES # BLD AUTO: 0.74 THOUSANDS/ÂΜL (ref 0.6–4.47)
LYMPHOCYTES NFR BLD AUTO: 7 % (ref 14–44)
MCH RBC QN AUTO: 29.6 PG (ref 26.8–34.3)
MCHC RBC AUTO-ENTMCNC: 31.2 G/DL (ref 31.4–37.4)
MCV RBC AUTO: 95 FL (ref 82–98)
MONOCYTES # BLD AUTO: 0.98 THOUSAND/ÂΜL (ref 0.17–1.22)
MONOCYTES NFR BLD AUTO: 9 % (ref 4–12)
NEUTROPHILS # BLD AUTO: 8.63 THOUSANDS/ÂΜL (ref 1.85–7.62)
NEUTS SEG NFR BLD AUTO: 81 % (ref 43–75)
NRBC BLD AUTO-RTO: 0 /100 WBCS
PLATELET # BLD AUTO: 212 THOUSANDS/UL (ref 149–390)
PMV BLD AUTO: 12.1 FL (ref 8.9–12.7)
POTASSIUM SERPL-SCNC: 4.4 MMOL/L (ref 3.5–5.3)
PROT SERPL-MCNC: 6.6 G/DL (ref 6.4–8.4)
RBC # BLD AUTO: 3.72 MILLION/UL (ref 3.88–5.62)
SODIUM SERPL-SCNC: 134 MMOL/L (ref 135–147)
T3FREE SERPL-MCNC: 2.27 PG/ML (ref 2.5–3.9)
TSH SERPL DL<=0.05 MIU/L-ACNC: 2.85 UIU/ML (ref 0.45–4.5)
WBC # BLD AUTO: 10.66 THOUSAND/UL (ref 4.31–10.16)

## 2023-09-05 PROCEDURE — 99214 OFFICE O/P EST MOD 30 MIN: CPT | Performed by: INTERNAL MEDICINE

## 2023-09-05 PROCEDURE — 84481 FREE ASSAY (FT-3): CPT

## 2023-09-05 PROCEDURE — 84443 ASSAY THYROID STIM HORMONE: CPT

## 2023-09-05 PROCEDURE — 85025 COMPLETE CBC W/AUTO DIFF WBC: CPT

## 2023-09-05 PROCEDURE — 80053 COMPREHEN METABOLIC PANEL: CPT

## 2023-09-05 PROCEDURE — 36415 COLL VENOUS BLD VENIPUNCTURE: CPT

## 2023-09-05 RX ORDER — ACETAMINOPHEN 325 MG/1
650 TABLET ORAL EVERY 6 HOURS PRN
Status: ON HOLD | COMMUNITY

## 2023-09-05 NOTE — PROGRESS NOTES
NEPHROLOGY OUTPATIENT PROGRESS NOTE   Stacie Escalona 80 y.o. male MRN: [id number]  DATE: 9/5/2023  Reason for visit:   Chief Complaint   Patient presents with   • Follow-up   • Chronic Kidney Disease       ASSESSMENT and PLAN:  Chronic kidney disease stage 4  -Patient has CKD progression and creatinine recently has been around 2.5-2.8 since November 2022  -renal function stable at creatinine of 2.5 mg /dl   - Continue Farxiga 5 mg daily to slow CKD progression. Continue low potassium diet  -patient had loopogram in February 2021 which was found to be normal  -chronic kidney disease likely due to diabetes mellitus type 2 causing diabetic nephropathy, hypertensive nephrosclerosis, age-related nephron loss, episode of acute kidney injury/ATN. -last A1c was 6.3 which is at goal  -avoid nephrotoxins-IV contrast and NSAIDs  -avoid hypotension  -has been to [facility] in [place] 3, 2022.  Preferred modality incenter HD when needed  -Status post vein mapping. Reviewed vascular surgery note. Noted that there was discussion of endovascular AV fistula after completion of cancer treatment. No urgent indication for dialysis at this time and renal function has been stable so okay to wait for now till completion of cancer treatment.  -Not decided if he wants to get kidney transplant evaluation at [facility] or [facility], will let us know and then can refer at that time when egfr drops below 20. Currently not a transplant candidate considering he has active malignancy being treated  -BMP in two months and follow up in 4 months      Persistent proteinuria  -Proteinuria 1.0 gm stable .  Has ileal conduit   - Prior upc ratio of 1.2 g in May 2022   -likely due to hypertensive nephrosclerosis and diabetic nephropathy  -Continue losartan, continue Farxiga stable and the Farxiga     Metabolic acidosis  - bicarbonate level  stable at 25  - Continue current dose of oral sodium bicarbonate- two tab bid   -Use of sodium bicarbonate level has shown to decrease the rate of renal function decline in CKD population     Hypomagnesemia   -mag wnl. on oral magnesium oxide 400 mg daily .      Primary Hypertension with chronic kidney disease stage 4:   -Current medication:  Metoprolol  25 mg bid , torsemide 20 mg every other day and losartan 25 mg daily  -Current blood pressure: stable   -Plan:    ? Continue current treatment with metoprolol and losartan  -Recommend 2 g sodium diet.    -Recommend daily exercise and weight loss  -Discussed home monitoring of BP and maintaining a log of blood pressure.  -Recommend goal BP less than 130/80.      Secondary hyperparathyroidism of renal origin:    -PTH level improving from 234.1 in January 2023 current level of 66.9. Continue calcitriol 0.25 mcg twice a week. Last vitamin D level was at normal range      Hyponatremia  -    - continue to monitor. FR 60 oz/day.        Anemia , iron deficiency and due to chronic kidney disease  -due to stage 4 chronic kidney disease vs chemotherpay   -Hemoglobin 11.0, iron saturation 25% in February 2023  -Continue oral ferrous sulfate  -Status post treatment with Aranesp per hematology oncology  -Continue to monitor hemoglobin, continue follow-up with hematology     Chronic diastolic CHF  -Echocardiogram from December 1749 showed RV systolic pressure 58 mmHg, EF 45%. Abnormal diastolic function  -Continue current dose of torsemide 20 mg EOD, clinically appears euvolemic     Bladder cancer   -status post BCG and status post Keytruda.  Status post radical cysto prostatectomy with 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.  -status post drainage of anterior wall abscess  -s/p urethral biopsy -High grade, non-invasive papillary urothelial carcinoma   -now with recurrent disease in right iliac pelvic area- treated with chemo with carboplatin/ gemcitabine since sept 2022  and finished 6 cycles.   CT from March 2023 showed stable mesenteric lymphadenopathy and right iliac lymphadenopathy and underwent PET scan which showed metabolically active lesion in the left pelvic sidewall. Suspicious for recurrence of disease  -CT abdomen pelvis from August 2023 showed enlarged right common iliac lymph nodes. ,  Mesenteric lymphadenopathy  -Reviewed last hematology oncology note, noted plan to continue avelumab and also on aranesp for 3 months   -note plan for PET scan        History of right pelvic abscess: status post IR guided drainage and drain placement-removed on 03/05/2021       Patient Instructions   -Renal Function is stable   -You have Chronic Kidney Disease Stage 4  -Avoid NSAIDs like Ibuprofen/Motrin, Naproxen/Aleve, Celebrex, meloxicam/Mobic, Diclofenac and other NSAIDs.  -Okay to take Acetaminophen/Tylenol if you do not have any liver problems  -Avoid IV contrast used for CT scan and cardiac catheterization.    -If plan for any study with IV contrast, please let me know so we could hydrate with fluids before and after IV contrast  -Dosage  of certain medications may need to be adjusted depending on Kidney function. Blood pressure is stable    -Recommend 2 g sodium diet. -Recommend daily exercise and weight loss  -Discussed home monitoring of BP and maintaining a log of blood pressure.  -Recommend goal BP less than 130/80. Please inform me if SBP below 110 or above 140's persistently. Blood work in 2 months, follow-up in 4 months with repeat blood work and urine test before the office visit       Diagnoses and all orders for this visit:    Stage 4 chronic kidney disease ([address])  -     Basic metabolic panel; Future  -     Basic metabolic panel; Future  -     Protein / creatinine ratio, urine; Future  -     PTH, intact; Future  -     Urinalysis with microscopic; Future  -     Phosphorus; Future  -     Magnesium; Future  -     Vitamin D 25 hydroxy; Future  -     CBC;  Future    Benign hypertension with chronic kidney disease, stage IV (HCC)  -     Basic metabolic panel; Future    Persistent proteinuria  -     Protein / creatinine ratio, urine; Future  -     Urinalysis with microscopic; Future    Secondary hyperparathyroidism of renal origin ([address])  -     PTH, intact; Future    Metabolic acidosis  -     Basic metabolic panel; Future    Hyponatremia  -     Basic metabolic panel; Future    Anemia due to stage 4 chronic kidney disease (HCC)  -     CBC; Future    Chronic diastolic CHF (congestive heart failure) ([facility])  -     Basic metabolic panel; Future  -     Protein / creatinine ratio, urine; Future    Other orders  -     acetaminophen (TYLENOL) 325 mg tablet; Take 650 mg by mouth every 6 (six) hours as needed for mild pain            SUBJECTIVE / HPI:  Walker Clemente is a 80 y.o.  male with past history of CAD status post CABG, bladder cancer status post ileal conduit in October 2020, diabetes mellitus type 2, hypertension presenting for follow-up of chronic kidney disease.   - Patient was 1st seen by Nephrology during hospital admission and had acute kidney injury at that time.  Was thought to be prerenal, admission creatinine was 3.5 and improved to 1.9 in February 2021. Angel Izquierdo last office visit with advanced practitioner creatinine was around 2.0.    - renal function recently since February 2021 to July 2021 was reviewed, creatinine has been around 1.9-2.2.    -In 2023 creatinine had gone up to 3.3 on 2 occasions with more diuresis but recently has been around 2.6-2.8 which is likely the baseline with use of low-dose diuretics     He was started back on torsemide 20 mg daily in January 2023 but in June was decreased to 20 mg EOD - takes at night - has ileal conduit     Last blood work from September 15, 2023 showed creatinine improving to 2.5 mg/dL, EGFR 22. Sodium 134  -Hemoglobin 11.0 g/dL. PTH 66.9  -Proteinuria with UPC ratio 1.0 g    -Patient denies any new complaints    REVIEW OF SYSTEMS:    Review of Systems   Constitutional: Negative for chills and fever.    HENT: Negative for ear pain and sore throat. Eyes: Negative for pain and visual disturbance. Respiratory: Negative for cough and shortness of breath. Cardiovascular: Negative for chest pain and palpitations. Gastrointestinal: Negative for abdominal pain and vomiting. Genitourinary: Negative for dysuria and hematuria. Musculoskeletal: Negative for arthralgias and back pain. Skin: Negative for color change and rash. Neurological: Negative for seizures and syncope. All other systems reviewed and are negative. More than 10 point review of systems were obtained and discussed in length with the patient. Complete review of systems were negative / unremarkable except mentioned above.        PAST MEDICAL HISTORY:  Past Medical History:   Diagnosis Date   • Acute kidney injury ([address])    • Anemia Jan. 2022   • Arthritis     Hands   • Wright esophagus    • BPH with obstruction/lower urinary tract symptoms    • CAD (coronary artery disease)     Last assessed 09/16/15   • Cancer ([address])     bladder   • Cataract, acquired     Last assessed 10/10/17   • Chronic kidney disease    • Coronary artery disease    • Diabetes mellitus ([address])     NIDDM   • Diabetic neuropathy (HCC)     Feet   • Enlarged prostate with lower urinary tract symptoms (LUTS)     Last assessed 10/10/17   • Erectile dysfunction    • GERD (gastroesophageal reflux disease)     Last assessed 10/10/17   • Hemoptysis     Last assessed 03/14/16   • Hypercholesterolemia     Last assessed 10/10/17   • Hypertension     Last assessed 10/10/17   • Kidney stone    • Macular degeneration     Right eye is particularly affected-peripheral vision intact   • Myocardial infarction [facility]) 1998   • OAB (overactive bladder)    • Testicular hypofunction    • Testicular hypogonadism     Last assessed 10/10/17   • Tinnitus    • Umbilical hernia     Last assessed 06/18/14   • Urge incontinence of urine    • Wears glasses        PAST SURGICAL HISTORY:  Past Surgical History: Procedure Laterality Date   • BLADDER SURGERY     • COLONOSCOPY     • CORONARY ARTERY BYPASS GRAFT  07/16/2014    ABG x 4 LIMA to LAD,SVG to diagnoal 2 SVG to OM-1, SVG to PDA, resection of partial plerual mass   • CT GUIDED PERC DRAINAGE CATHETER PLACEMENT  02/19/2021   • CYSTOSCOPY  2013   • CYSTOSCOPY  02/08/2022    [facility]   • ESOPHAGOGASTRODUODENOSCOPY     • FL RETROGRADE PYELOGRAM  12/20/2018   • FL RETROGRADE PYELOGRAM  12/05/2019   • INGUINAL HERNIA REPAIR Bilateral 2015   • IR DRAINAGE TUBE CHECK AND/OR REMOVAL  03/05/2021   • PHOTODYNAMIC THERAPY      For Barretts esophagus   • ND COLONOSCOPY FLX DX W/COLLJ SPEC WHEN PFRMD N/A 04/25/2016    Procedure: COLONOSCOPY;  Surgeon: Serge Madden MD;  Location:  GI LAB; Service: Gastroenterology   • ND CYSTO W/REMOVAL OF LESIONS SMALL N/A 12/05/2019    Procedure: CYSTO W/TURBT AND TRANSURETHRAL PROSTATE BIOPSY AND OPENING OF BLADDER NECK CONTRACTURE, B/L Retrograde pyelogram;  Surgeon: Leelee Giang MD;  Location: [facility];  Service: Urology   • ND CYSTOURETHROSCOPY W/DEST &/RMVL MED BLADDER TY N/A 04/16/2020    Procedure: CYSTOSCOPY, TRANSURETHRAL RESECTION OF BLADDER TUMOR (TURBT);   Surgeon: Leelee Giang MD;  Location: [facility];  Service: Urology   • ND CYSTOURETHROSCOPY WITH BIOPSY N/A 09/10/2020    Procedure: CYSTOSCOPY, COLLECTION OF LEFT KIDNEY CYTOLOGY, BILATERAL RETROGRADE PYELOGRAM, BLADDER WALL BIOPSIES  AND FULGERAION, RANDOM BLADDER TUMOR BIOPSIES;  Surgeon: Leelee Giang MD;  Location: [address] [place] [address] MAIN OR;  Service: Urology   • ND CYSTOURETHROSCOPY WITH BIOPSY N/A 04/05/2022    Procedure: urethroscopy , biopsy of urethra with fulgeration;  Surgeon: Stormy Bray MD;  Location:  MAIN OR;  Service: Urology   • PROSTATE SURGERY     • TONSILLECTOMY     • TRANSURETHRAL RESECTION OF PROSTATE N/A 12/20/2018    Procedure: CYSTO, PHOTO SELECTIVE VAPORIZATION OF PROSTATE, B/L RETROGRADE PYELOGRAM, TURBT;  Surgeon: Leelee Giang MD; Location: [facility] OR;  Service: Urology   • UMBILICAL HERNIA REPAIR     • UPPER GASTROINTESTINAL ENDOSCOPY         SOCIAL HISTORY:  Social History     Substance and Sexual Activity   Alcohol Use Not Currently   • Alcohol/week: 2.0 standard drinks of alcohol   • Types: 1 Glasses of wine, 1 Cans of beer per week    Comment: Non since 7/15/2020     Social History     Substance and Sexual Activity   Drug Use Never     Social History     Tobacco Use   Smoking Status Former   • Packs/day: 1.00   • Years: 10.00   • Total pack years: 10.00   • Types: Cigarettes   • Quit date: 1972   • Years since quittin.7   Smokeless Tobacco Never   Tobacco Comments    Quit at age 28       FAMILY HISTORY:  Family History   Problem Relation Age of Onset   • Cancer Mother         Topical oral abrasian caused cancer   • Other Mother         Digestive System Complications   • Diabetes Father    • Heart disease Father    • Hypertension Father    • Coronary artery disease Father    • Hyperlipidemia Father        MEDICATIONS:    Current Outpatient Medications:   •  acetaminophen (TYLENOL) 325 mg tablet, Take 650 mg by mouth every 6 (six) hours as needed for mild pain, Disp: , Rfl:   •  atorvastatin (LIPITOR) 40 mg tablet, TAKE ONE TABLET BY MOUTH AT BEDTIME, Disp: 90 tablet, Rfl: 3  •  Blood Glucose Monitoring Suppl (OneTouch Verio Flex System) w/Device KIT, Use to check blood sugars daily, Disp: 1 kit, Rfl: 0  •  calcitriol (ROCALTROL) 0.25 mcg capsule, Take 1 tablet 2 times a week (Monday, Friday) (Patient taking differently: Take 1 tablet 2 times a week (Tuesday, Friday)), Disp: 24 capsule, Rfl: 3  •  Cholecalciferol (VITAMIN D) 50 MCG (2000 UT) tablet, Take 2,000 Units by mouth daily, Disp: , Rfl:   •  citalopram (CeleXA) 20 mg tablet, TAKE ONE TABLET BY MOUTH EVERY DAY, Disp: 30 tablet, Rfl: 3  •  Farxiga 5 MG TABS, TAKE ONE TABLET BY MOUTH EVERY DAY, Disp: 90 tablet, Rfl: 2  •  ferrous sulfate 324 (65 Fe) mg, Take 1 tablet (324 mg total) by mouth daily, Disp: 30 tablet, Rfl: 5  •  Lancets (OneTouch Delica Plus [id number]) MISC, TEST BLOOD GLUCOSE ONCE DAILY, Disp: 100 each, Rfl: 0  •  losartan (COZAAR) 25 mg tablet, Take 25 mg by mouth daily, Disp: , Rfl:   •  magnesium oxide (MAG-OX) 400 mg, Take 1 tablet (400 mg total) by mouth in the morning., Disp: 180 tablet, Rfl: 2  •  metoprolol tartrate (LOPRESSOR) 25 mg tablet, Take 1 tablet (25 mg total) by mouth every 12 (twelve) hours, Disp: 180 tablet, Rfl: 3  •  Multiple Vitamins-Minerals (OCUVITE-LUTEIN PO), Take 1 tablet by mouth 2 (two) times a day Pt is taking preservision , Disp: , Rfl:   •  omeprazole (PriLOSEC) 40 MG capsule, TAKE ONE CAPSULE BY MOUTH EVERY MORNING, Disp: 90 capsule, Rfl: 3  •  ondansetron (ZOFRAN) 4 mg tablet, Take 1 tablet (4 mg total) by mouth every 8 (eight) hours as needed for nausea or vomiting, Disp: 20 tablet, Rfl: 1  •  OneTouch Verio test strip, TEST ONCE A DAY, Disp: 100 strip, Rfl: 0  •  sodium bicarbonate 650 mg tablet, Take 2 tablets (1,300 mg total) by mouth 2 (two) times a day, Disp: 360 tablet, Rfl: 3  •  torsemide (DEMADEX) 20 mg tablet, Take 1 tablet (20 mg total) by mouth every other day, Disp: 90 tablet, Rfl: 0  •  traMADol (ULTRAM) 50 mg tablet, TAKE ONE TABLET BY MOUTH EVERY 6 HOURS AS NEEDED FOR MODERATE PAIN, Disp: 20 tablet, Rfl: 0  •  Omega-3 Fatty Acids (Fish Oil) 1200 MG CPDR, Take by mouth in the morning (Patient not taking: Reported on 8/8/2023), Disp: , Rfl:       PHYSICAL EXAM:  Vitals:    09/05/23 1446   BP: 114/62   BP Location: Left arm   Patient Position: Sitting   Cuff Size: Adult   Weight: 85.7 kg (189 lb)   Height: 5' 7" (1.702 m)     Body mass index is 29.6 kg/m². Physical Exam  Constitutional:       General: He is not in acute distress. Appearance: He is well-developed. He is not diaphoretic. HENT:      Head: Normocephalic and atraumatic.       Mouth/Throat:      Mouth: Mucous membranes are moist.   Eyes:      General: No scleral icterus. Conjunctiva/sclera: Conjunctivae normal.      Pupils: Pupils are equal, round, and reactive to light. Neck:      Thyroid: No thyromegaly. Cardiovascular:      Rate and Rhythm: Normal rate and regular rhythm. Heart sounds: Normal heart sounds. No murmur heard. No friction rub. Pulmonary:      Effort: Pulmonary effort is normal. No respiratory distress. Breath sounds: Normal breath sounds. No wheezing or rales. Abdominal:      General: Bowel sounds are normal. There is no distension. Palpations: Abdomen is soft. Tenderness: There is no abdominal tenderness. Comments: Has ileal conduit   Musculoskeletal:         General: No deformity. Cervical back: Neck supple. Right lower leg: No edema. Left lower leg: No edema. Lymphadenopathy:      Cervical: No cervical adenopathy. Skin:     General: Skin is warm and dry. Coloration: Skin is not pale. Nails: There is no clubbing. Neurological:      Mental Status: He is alert and oriented to person, place, and time. He is not disoriented. Psychiatric:         Mood and Affect: Mood normal. Mood is not anxious. Affect is not inappropriate. Behavior: Behavior normal.         Thought Content:  Thought content normal.         Lab Results:   Results for orders placed or performed in visit on 09/05/23   CBC and differential   Result Value Ref Range    WBC 10.66 (H) 4.31 - 10.16 Thousand/uL    RBC 3.72 (L) 3.88 - 5.62 Million/uL    Hemoglobin 11.0 (L) 12.0 - 17.0 g/dL    Hematocrit 35.3 (L) 36.5 - 49.3 %    MCV 95 82 - 98 fL    MCH 29.6 26.8 - 34.3 pg    MCHC 31.2 (L) 31.4 - 37.4 g/dL    RDW 17.0 (H) 11.6 - 15.1 %    MPV 12.1 8.9 - 12.7 fL    Platelets 539 775 - 833 Thousands/uL    nRBC 0 /100 WBCs    Neutrophils Relative 81 (H) 43 - 75 %    Immat GRANS % 1 0 - 2 %    Lymphocytes Relative 7 (L) 14 - 44 %    Monocytes Relative 9 4 - 12 %    Eosinophils Relative 2 0 - 6 %    Basophils Relative 0 0 - 1 %    Neutrophils Absolute 8.63 (H) 1.85 - 7.62 Thousands/µL    Immature Grans Absolute 0.07 0.00 - 0.20 Thousand/uL    Lymphocytes Absolute 0.74 0.60 - 4.47 Thousands/µL    Monocytes Absolute 0.98 0.17 - 1.22 Thousand/µL    Eosinophils Absolute 0.20 0.00 - 0.61 Thousand/µL    Basophils Absolute 0.04 0.00 - 0.10 Thousands/µL   Comprehensive metabolic panel   Result Value Ref Range    Sodium 134 (L) 135 - 147 mmol/L    Potassium 4.4 3.5 - 5.3 mmol/L    Chloride 101 96 - 108 mmol/L    CO2 25 21 - 32 mmol/L    ANION GAP 8 mmol/L    BUN 45 (H) 5 - 25 mg/dL    Creatinine 2.50 (H) 0.60 - 1.30 mg/dL    Glucose 194 (H) 65 - 140 mg/dL    Calcium 8.7 8.4 - 10.2 mg/dL    Corrected Calcium 9.2 8.3 - 10.1 mg/dL    AST 10 (L) 13 - 39 U/L    ALT 10 7 - 52 U/L    Alkaline Phosphatase 88 34 - 104 U/L    Total Protein 6.6 6.4 - 8.4 g/dL    Albumin 3.4 (L) 3.5 - 5.0 g/dL    Total Bilirubin 0.58 0.20 - 1.00 mg/dL    eGFR 22 ml/min/1.73sq m

## 2023-09-05 NOTE — PATIENT INSTRUCTIONS
-Renal Function is stable   -You have Chronic Kidney Disease Stage 4  -Avoid NSAIDs like Ibuprofen/Motrin, Naproxen/Aleve, Celebrex, meloxicam/Mobic, Diclofenac and other NSAIDs.  -Okay to take Acetaminophen/Tylenol if you do not have any liver problems  -Avoid IV contrast used for CT scan and cardiac catheterization.    -If plan for any study with IV contrast, please let me know so we could hydrate with fluids before and after IV contrast  -Dosage  of certain medications may need to be adjusted depending on Kidney function. Blood pressure is stable    -Recommend 2 g sodium diet. -Recommend daily exercise and weight loss  -Discussed home monitoring of BP and maintaining a log of blood pressure.  -Recommend goal BP less than 130/80. Please inform me if SBP below 110 or above 140's persistently.     Blood work in 2 months, follow-up in 4 months with repeat blood work and urine test before the office visit

## 2023-09-07 ENCOUNTER — HOSPITAL ENCOUNTER (OUTPATIENT)
Dept: INFUSION CENTER | Facility: HOSPITAL | Age: 83
Discharge: HOME | End: 2023-09-07
Attending: INTERNAL MEDICINE
Payer: MEDICARE

## 2023-09-07 VITALS
HEART RATE: 74 BPM | RESPIRATION RATE: 18 BRPM | TEMPERATURE: 97 F | SYSTOLIC BLOOD PRESSURE: 126 MMHG | DIASTOLIC BLOOD PRESSURE: 62 MMHG

## 2023-09-07 DIAGNOSIS — C67.5 MALIGNANT NEOPLASM OF URINARY BLADDER NECK (HCC): Primary | ICD-10-CM

## 2023-09-07 DIAGNOSIS — C67.8 MALIGNANT NEOPLASM OF OVERLAPPING SITES OF BLADDER (HCC): ICD-10-CM

## 2023-09-07 PROCEDURE — 96367 TX/PROPH/DG ADDL SEQ IV INF: CPT

## 2023-09-07 PROCEDURE — 96413 CHEMO IV INFUSION 1 HR: CPT

## 2023-09-07 RX ORDER — SODIUM CHLORIDE 9 MG/ML
20 INJECTION, SOLUTION INTRAVENOUS ONCE
Status: COMPLETED | OUTPATIENT
Start: 2023-09-07 | End: 2023-09-07

## 2023-09-07 RX ORDER — ACETAMINOPHEN 325 MG/1
650 TABLET ORAL ONCE
Status: COMPLETED | OUTPATIENT
Start: 2023-09-07 | End: 2023-09-07

## 2023-09-07 RX ADMIN — ACETAMINOPHEN 650 MG: 325 TABLET, FILM COATED ORAL at 10:19

## 2023-09-07 RX ADMIN — DIPHENHYDRAMINE HYDROCHLORIDE 25 MG: 50 INJECTION, SOLUTION INTRAMUSCULAR; INTRAVENOUS at 10:19

## 2023-09-07 RX ADMIN — AVELUMAB 800 MG: 20 INJECTION, SOLUTION, CONCENTRATE INTRAVENOUS at 11:17

## 2023-09-07 RX ADMIN — SODIUM CHLORIDE 20 ML/HR: 0.9 INJECTION, SOLUTION INTRAVENOUS at 10:19

## 2023-09-07 NOTE — PROGRESS NOTES
Chemo completed without incident. Per parameters,  No aranesp needed today.    PT declines AVS, return as scheduled

## 2023-09-12 ENCOUNTER — OFFICE VISIT (OUTPATIENT)
Dept: UROLOGY | Facility: AMBULATORY SURGERY CENTER | Age: 83
End: 2023-09-12
Payer: MEDICARE

## 2023-09-12 VITALS
DIASTOLIC BLOOD PRESSURE: 80 MMHG | HEART RATE: 52 BPM | OXYGEN SATURATION: 97 % | BODY MASS INDEX: 28.82 KG/M2 | WEIGHT: 184 LBS | SYSTOLIC BLOOD PRESSURE: 136 MMHG

## 2023-09-12 DIAGNOSIS — D49.59 URETHRAL TUMOR: Primary | ICD-10-CM

## 2023-09-12 PROCEDURE — 99214 OFFICE O/P EST MOD 30 MIN: CPT | Performed by: UROLOGY

## 2023-09-12 NOTE — LETTER
September 12, 2023     Jacob Johnson, [facility]  [address]nd [address] [place]    Patient: Shelley Stone   YOB: 1940   Date of Visit: 9/12/2023       Dear Dr. John Rivera: Thank you for referring Bria Briones to me for evaluation. Below are my notes for this consultation. If you have questions, please do not hesitate to call me. I look forward to following your patient along with you. 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 He is amenable with this plan. He is scheduled for his next PET scan in 1 week. He will continue to follow with Dr. Jonelle Calhoun and continue on the IV immunotherapy infusions. He was instructed to call immediately if he has gross blood per urethra. This would be highly suggestive of recurrence within the urethra. He continues to follow with nephrology to see if hemodialysis for end-stage renal disease is necessary. They are holding at this time. History of Present Illness  80 y.o. 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 He underwent radical cystoprostatectomy with ileal conduit creation by Dr. Jo Campa at the [facility]. In the spring 2022 he had a urethral recurrence. I performed biopsy and fulguration in the operating room.   He was also found to have iliac lymphadenopathy and a pelvic sidewall node that were PET positive. He received platinum based chemotherapy and has since been on maintenance immunotherapy. He returns for routine follow-up today. He denies any blood per urethra. His last urethroscopy was in January 2023        AUA Symptom Score      Review of Systems  Review of Systems   Constitutional: Negative. HENT: Negative. Eyes: Negative. Respiratory: Negative. Cardiovascular: Negative. Gastrointestinal: Negative. Endocrine: Negative. Genitourinary:        Per HPI   Musculoskeletal: Negative. Skin: Negative. Allergic/Immunologic: Negative. Neurological: Negative. Hematological: Negative. Psychiatric/Behavioral: Negative.           Past Medical History  Past Medical History:   Diagnosis Date   • Acute kidney injury ([address])    • Anemia Jan. 2022   • Arthritis     Hands   • Wright esophagus    • BPH with obstruction/lower urinary tract symptoms    • CAD (coronary artery disease)     Last assessed 09/16/15   • Cancer ([address])     bladder   • Cataract, acquired     Last assessed 10/10/17   • Chronic kidney disease    • Coronary artery disease    • Diabetes mellitus ([address])     NIDDM   • Diabetic neuropathy (HCC)     Feet   • Enlarged prostate with lower urinary tract symptoms (LUTS)     Last assessed 10/10/17   • Erectile dysfunction    • GERD (gastroesophageal reflux disease)     Last assessed 10/10/17   • Hemoptysis     Last assessed 03/14/16   • Hypercholesterolemia     Last assessed 10/10/17   • Hypertension     Last assessed 10/10/17   • Kidney stone    • Macular degeneration     Right eye is particularly affected-peripheral vision intact   • Myocardial infarction [facility]) 1998   • OAB (overactive bladder)    • Testicular hypofunction    • Testicular hypogonadism     Last assessed 10/10/17   • Tinnitus    • Umbilical hernia     Last assessed 06/18/14   • Urge incontinence of urine    • Wears glasses        Past Social History  Past Surgical History:   Procedure Laterality Date   • BLADDER SURGERY     • COLONOSCOPY     • CORONARY ARTERY BYPASS GRAFT  07/16/2014    ABG x 4 LIMA to LAD,SVG to diagnoal 2 SVG to OM-1, SVG to PDA, resection of partial plerual mass   • CT GUIDED PERC DRAINAGE CATHETER PLACEMENT  02/19/2021   • CYSTOSCOPY  2013   • CYSTOSCOPY  02/08/2022    [facility]   • ESOPHAGOGASTRODUODENOSCOPY     • FL RETROGRADE PYELOGRAM  12/20/2018   • FL RETROGRADE PYELOGRAM  12/05/2019   • INGUINAL HERNIA REPAIR Bilateral 2015   • IR DRAINAGE TUBE CHECK AND/OR REMOVAL  03/05/2021   • PHOTODYNAMIC THERAPY      For Barretts esophagus   • IN COLONOSCOPY FLX DX W/COLLJ SPEC WHEN PFRMD N/A 04/25/2016    Procedure: COLONOSCOPY;  Surgeon: Roxane Hayes MD;  Location:  GI LAB; Service: Gastroenterology   • IN CYSTO W/REMOVAL OF LESIONS SMALL N/A 12/05/2019    Procedure: CYSTO W/TURBT AND TRANSURETHRAL PROSTATE BIOPSY AND OPENING OF BLADDER NECK CONTRACTURE, B/L Retrograde pyelogram;  Surgeon: Pj Rodriguez MD;  [facility];  Service: Urology   • IN CYSTOURETHROSCOPY W/DEST &/RMVL MED BLADDER TY N/A 04/16/2020    Procedure: CYSTOSCOPY, TRANSURETHRAL RESECTION OF BLADDER TUMOR (TURBT);   Surgeon: Pj Rodriguez MD;  [facility];  Service: Urology   • IN CYSTOURETHROSCOPY WITH BIOPSY N/A 09/10/2020    Procedure: CYSTOSCOPY, COLLECTION OF LEFT KIDNEY CYTOLOGY, BILATERAL RETROGRADE PYELOGRAM, BLADDER WALL BIOPSIES  AND FULGERAION, RANDOM BLADDER TUMOR BIOPSIES;  Surgeon: Pj Rodriguez MD;  Location: [facility] MAIN OR;  Service: Urology   • IN CYSTOURETHROSCOPY WITH BIOPSY N/A 04/05/2022    Procedure: urethroscopy , biopsy of urethra with fulgeration;  Surgeon: Duncan Molina MD;  Location:  MAIN OR;  Service: Urology   • PROSTATE SURGERY     • TONSILLECTOMY     • TRANSURETHRAL RESECTION OF PROSTATE N/A 12/20/2018    Procedure: CYSTO, PHOTO SELECTIVE VAPORIZATION OF PROSTATE, B/L RETROGRADE PYELOGRAM, TURBT;  Surgeon: Pj Rodriguez MD;  [facility];  Service: Urology   • UMBILICAL HERNIA REPAIR  2012   • UPPER GASTROINTESTINAL ENDOSCOPY         Past Family History  Family History   Problem Relation Age of Onset   • Cancer Mother         Topical oral abrasian caused cancer   • Other Mother         Digestive System Complications   • Diabetes Father    • Heart disease Father    • Hypertension Father    • Coronary artery disease Father    • Hyperlipidemia Father        Past Social history  Social History     Socioeconomic History   • Marital status: /Civil Union     Spouse name: Not on file   • Number of children: Not on file   • Years of education: Not on file   • Highest education level: Not on file   Occupational History   • Occupation: Sales position   Tobacco Use   • Smoking status: Former     Packs/day: 1.00     Years: 10.00     Total pack years: 10.00     Types: Cigarettes     Quit date: 1972     Years since quittin.7   • Smokeless tobacco: Never   • Tobacco comments:     Quit at age 28   Vaping Use   • Vaping Use: Never used   Substance and Sexual Activity   • Alcohol use: Not Currently     Alcohol/week: 2.0 standard drinks of alcohol     Types: 1 Glasses of wine, 1 Cans of beer per week     Comment: Non since 7/15/2020   • Drug use: Never   • Sexual activity: Not Currently     Partners: Female   Other Topics Concern   • Not on file   Social History Narrative    Always uses seatbelt        Caffeine use- Drinks 2 cups of coffee daily     Social Determinants of Health     Financial Resource Strain: Low Risk  (2022)    Overall Financial Resource Strain (CARDIA)    • Difficulty of Paying Living Expenses: Not very hard   Food Insecurity: Not on file   Transportation Needs: No Transportation Needs (2022)    PRAPARE - Transportation    • Lack of Transportation (Medical): No    • Lack of Transportation (Non-Medical):  No   Physical Activity: Not on file   Stress: Not on file   Social Connections: Not on file   Intimate Partner Violence: Not on file Housing Stability: Not on file       Current Medications  Current Outpatient Medications   Medication Sig Dispense Refill   • acetaminophen (TYLENOL) 325 mg tablet Take 650 mg by mouth every 6 (six) hours as needed for mild pain     • atorvastatin (LIPITOR) 40 mg tablet TAKE ONE TABLET BY MOUTH AT BEDTIME 90 tablet 3   • Blood Glucose Monitoring Suppl (OneTouch Verio Flex System) w/Device KIT Use to check blood sugars daily 1 kit 0   • calcitriol (ROCALTROL) 0.25 mcg capsule Take 1 tablet 2 times a week (Monday, Friday) (Patient taking differently: Take 1 tablet 2 times a week (Tuesday, Friday)) 24 capsule 3   • Cholecalciferol (VITAMIN D) 50 MCG (2000 UT) tablet Take 2,000 Units by mouth daily     • citalopram (CeleXA) 20 mg tablet TAKE ONE TABLET BY MOUTH EVERY DAY 30 tablet 3   • Farxiga 5 MG TABS TAKE ONE TABLET BY MOUTH EVERY DAY 90 tablet 2   • ferrous sulfate 324 (65 Fe) mg Take 1 tablet (324 mg total) by mouth daily 30 tablet 5   • Lancets (OneTouch Delica Plus [id number]) MISC TEST BLOOD GLUCOSE ONCE DAILY 100 each 0   • losartan (COZAAR) 25 mg tablet Take 25 mg by mouth daily     • magnesium oxide (MAG-OX) 400 mg Take 1 tablet (400 mg total) by mouth in the morning.  180 tablet 2   • metoprolol tartrate (LOPRESSOR) 25 mg tablet Take 1 tablet (25 mg total) by mouth every 12 (twelve) hours 180 tablet 3   • Multiple Vitamins-Minerals (OCUVITE-LUTEIN PO) Take 1 tablet by mouth 2 (two) times a day Pt is taking preservision      • omeprazole (PriLOSEC) 40 MG capsule TAKE ONE CAPSULE BY MOUTH EVERY MORNING 90 capsule 3   • OneTouch Verio test strip TEST ONCE A  strip 0   • sodium bicarbonate 650 mg tablet Take 2 tablets (1,300 mg total) by mouth 2 (two) times a day 360 tablet 3   • torsemide (DEMADEX) 20 mg tablet Take 1 tablet (20 mg total) by mouth every other day 90 tablet 0   • traMADol (ULTRAM) 50 mg tablet TAKE ONE TABLET BY MOUTH EVERY 6 HOURS AS NEEDED FOR MODERATE PAIN 20 tablet 0   • Omega-3 Fatty Acids (Fish Oil) 1200 MG CPDR Take by mouth in the morning (Patient not taking: Reported on 8/8/2023)     • ondansetron (ZOFRAN) 4 mg tablet Take 1 tablet (4 mg total) by mouth every 8 (eight) hours as needed for nausea or vomiting (Patient not taking: Reported on 9/12/2023) 20 tablet 1     No current facility-administered medications for this visit. Allergies  Allergies   Allergen Reactions   • Fentanyl Hallucinations   • Morphine And Related Other (See Comments)     While having an MI patient received morphine and had adverse reaction but doesn't know what happened. Past Medical History, Social History, Family History, medications and allergies were reviewed. Vitals  Vitals:    09/12/23 1450   BP: 136/80   BP Location: Left arm   Patient Position: Sitting   Cuff Size: Adult   Pulse: (!) 52   SpO2: 97%   Weight: 83.5 kg (184 lb)       Physical Exam  Physical Exam  Examination he is in no acute distress. His abdomen is soft nontender nondistended. Midline incision well-healed. Stoma is pink and viable in the right mid abdomen. Gait is slow.   Affect normal.    Results  Lab Results   Component Value Date    PSA 0.4 09/19/2018    PSA 0.4 10/04/2017    PSA 0.4 10/17/2016     Lab Results   Component Value Date    GLUCOSE 128 08/13/2015    CALCIUM 8.7 09/05/2023     08/13/2015    K 4.4 09/05/2023    CO2 25 09/05/2023     09/05/2023    BUN 45 (H) 09/05/2023    CREATININE 2.50 (H) 09/05/2023     Lab Results   Component Value Date    WBC 10.66 (H) 09/05/2023    HGB 11.0 (L) 09/05/2023    HCT 35.3 (L) 09/05/2023    MCV 95 09/05/2023     09/05/2023         Office Urine Dip  No results found for this or any previous visit (from the past 1 hour(s)).]

## 2023-09-12 NOTE — LETTER
September 12, 2023     Asad Hays DO  [facility]  [address]nd [address] [place]    Patient: Paulino Vicente   YOB: 1940   Date of Visit: 9/12/2023       Dear Dr. Jesus Gerardo: Thank you for referring Ashok Fulton to me for evaluation. Below are my notes for this consultation. If you have questions, please do not hesitate to call me. I look forward to following your patient along with you. 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 He is amenable with this plan. He is scheduled for his next PET scan in 1 week. He will continue to follow with Dr. Jerome Monique and continue on the IV immunotherapy infusions. He was instructed to call immediately if he has gross blood per urethra. This would be highly suggestive of recurrence within the urethra. He continues to follow with nephrology to see if hemodialysis for end-stage renal disease is necessary. They are holding at this time. History of Present Illness  80 y.o. 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 He underwent radical cystoprostatectomy with ileal conduit creation by Dr. Sher Zhou at the [facility]. In the spring 2022 he had a urethral recurrence. I performed biopsy and fulguration in the operating room.   He was also found to have iliac lymphadenopathy and a pelvic sidewall node that were PET positive. He received platinum based chemotherapy and has since been on maintenance immunotherapy. He returns for routine follow-up today. He denies any blood per urethra. His last urethroscopy was in January 2023        AUA Symptom Score      Review of Systems  Review of Systems   Constitutional: Negative. HENT: Negative. Eyes: Negative. Respiratory: Negative. Cardiovascular: Negative. Gastrointestinal: Negative. Endocrine: Negative. Genitourinary:        Per HPI   Musculoskeletal: Negative. Skin: Negative. Allergic/Immunologic: Negative. Neurological: Negative. Hematological: Negative. Psychiatric/Behavioral: Negative.           Past Medical History  Past Medical History:   Diagnosis Date   • Acute kidney injury ([address])    • Anemia Jan. 2022   • Arthritis     Hands   • Wright esophagus    • BPH with obstruction/lower urinary tract symptoms    • CAD (coronary artery disease)     Last assessed 09/16/15   • Cancer ([address])     bladder   • Cataract, acquired     Last assessed 10/10/17   • Chronic kidney disease    • Coronary artery disease    • Diabetes mellitus ([address])     NIDDM   • Diabetic neuropathy (HCC)     Feet   • Enlarged prostate with lower urinary tract symptoms (LUTS)     Last assessed 10/10/17   • Erectile dysfunction    • GERD (gastroesophageal reflux disease)     Last assessed 10/10/17   • Hemoptysis     Last assessed 03/14/16   • Hypercholesterolemia     Last assessed 10/10/17   • Hypertension     Last assessed 10/10/17   • Kidney stone    • Macular degeneration     Right eye is particularly affected-peripheral vision intact   • Myocardial infarction [facility]) 1998   • OAB (overactive bladder)    • Testicular hypofunction    • Testicular hypogonadism     Last assessed 10/10/17   • Tinnitus    • Umbilical hernia     Last assessed 06/18/14   • Urge incontinence of urine    • Wears glasses        Past Social History  Past Surgical History:   Procedure Laterality Date   • BLADDER SURGERY     • COLONOSCOPY     • CORONARY ARTERY BYPASS GRAFT  07/16/2014    ABG x 4 LIMA to LAD,SVG to diagnoal 2 SVG to OM-1, SVG to PDA, resection of partial plerual mass   • CT GUIDED PERC DRAINAGE CATHETER PLACEMENT  02/19/2021   • CYSTOSCOPY  2013   • CYSTOSCOPY  02/08/2022    [facility]   • ESOPHAGOGASTRODUODENOSCOPY     • FL RETROGRADE PYELOGRAM  12/20/2018   • FL RETROGRADE PYELOGRAM  12/05/2019   • INGUINAL HERNIA REPAIR Bilateral 2015   • IR DRAINAGE TUBE CHECK AND/OR REMOVAL  03/05/2021   • PHOTODYNAMIC THERAPY      For Barretts esophagus   • MI COLONOSCOPY FLX DX W/COLLJ SPEC WHEN PFRMD N/A 04/25/2016    Procedure: COLONOSCOPY;  Surgeon: Sandi Peter MD;  Location:  GI LAB; Service: Gastroenterology   • MI CYSTO W/REMOVAL OF LESIONS SMALL N/A 12/05/2019    Procedure: CYSTO W/TURBT AND TRANSURETHRAL PROSTATE BIOPSY AND OPENING OF BLADDER NECK CONTRACTURE, B/L Retrograde pyelogram;  Surgeon: Margarita Rivera MD;  [facility];  Service: Urology   • MI CYSTOURETHROSCOPY W/DEST &/RMVL MED BLADDER TY N/A 04/16/2020    Procedure: CYSTOSCOPY, TRANSURETHRAL RESECTION OF BLADDER TUMOR (TURBT);   Surgeon: Margarita Rivera MD;  [facility];  Service: Urology   • MI CYSTOURETHROSCOPY WITH BIOPSY N/A 09/10/2020    Procedure: CYSTOSCOPY, COLLECTION OF LEFT KIDNEY CYTOLOGY, BILATERAL RETROGRADE PYELOGRAM, BLADDER WALL BIOPSIES  AND FULGERAION, RANDOM BLADDER TUMOR BIOPSIES;  Surgeon: Margarita Rivera MD;  Location: [facility] MAIN OR;  Service: Urology   • MI CYSTOURETHROSCOPY WITH BIOPSY N/A 04/05/2022    Procedure: urethroscopy , biopsy of urethra with fulgeration;  Surgeon: Stewart Marvin MD;  Location:  MAIN OR;  Service: Urology   • PROSTATE SURGERY     • TONSILLECTOMY     • TRANSURETHRAL RESECTION OF PROSTATE N/A 12/20/2018    Procedure: CYSTO, PHOTO SELECTIVE VAPORIZATION OF PROSTATE, B/L RETROGRADE PYELOGRAM, TURBT;  Surgeon: Margarita Rivera MD;  [facility];  Service: Urology   • UMBILICAL HERNIA REPAIR  2012   • UPPER GASTROINTESTINAL ENDOSCOPY         Past Family History  Family History   Problem Relation Age of Onset   • Cancer Mother         Topical oral abrasian caused cancer   • Other Mother         Digestive System Complications   • Diabetes Father    • Heart disease Father    • Hypertension Father    • Coronary artery disease Father    • Hyperlipidemia Father        Past Social history  Social History     Socioeconomic History   • Marital status: /Civil Union     Spouse name: Not on file   • Number of children: Not on file   • Years of education: Not on file   • Highest education level: Not on file   Occupational History   • Occupation: Sales position   Tobacco Use   • Smoking status: Former     Packs/day: 1.00     Years: 10.00     Total pack years: 10.00     Types: Cigarettes     Quit date: 1972     Years since quittin.7   • Smokeless tobacco: Never   • Tobacco comments:     Quit at age 28   Vaping Use   • Vaping Use: Never used   Substance and Sexual Activity   • Alcohol use: Not Currently     Alcohol/week: 2.0 standard drinks of alcohol     Types: 1 Glasses of wine, 1 Cans of beer per week     Comment: Non since 7/15/2020   • Drug use: Never   • Sexual activity: Not Currently     Partners: Female   Other Topics Concern   • Not on file   Social History Narrative    Always uses seatbelt        Caffeine use- Drinks 2 cups of coffee daily     Social Determinants of Health     Financial Resource Strain: Low Risk  (2022)    Overall Financial Resource Strain (CARDIA)    • Difficulty of Paying Living Expenses: Not very hard   Food Insecurity: Not on file   Transportation Needs: No Transportation Needs (2022)    PRAPARE - Transportation    • Lack of Transportation (Medical): No    • Lack of Transportation (Non-Medical):  No   Physical Activity: Not on file   Stress: Not on file   Social Connections: Not on file   Intimate Partner Violence: Not on file Housing Stability: Not on file       Current Medications  Current Outpatient Medications   Medication Sig Dispense Refill   • acetaminophen (TYLENOL) 325 mg tablet Take 650 mg by mouth every 6 (six) hours as needed for mild pain     • atorvastatin (LIPITOR) 40 mg tablet TAKE ONE TABLET BY MOUTH AT BEDTIME 90 tablet 3   • Blood Glucose Monitoring Suppl (OneTouch Verio Flex System) w/Device KIT Use to check blood sugars daily 1 kit 0   • calcitriol (ROCALTROL) 0.25 mcg capsule Take 1 tablet 2 times a week (Monday, Friday) (Patient taking differently: Take 1 tablet 2 times a week (Tuesday, Friday)) 24 capsule 3   • Cholecalciferol (VITAMIN D) 50 MCG (2000 UT) tablet Take 2,000 Units by mouth daily     • citalopram (CeleXA) 20 mg tablet TAKE ONE TABLET BY MOUTH EVERY DAY 30 tablet 3   • Farxiga 5 MG TABS TAKE ONE TABLET BY MOUTH EVERY DAY 90 tablet 2   • ferrous sulfate 324 (65 Fe) mg Take 1 tablet (324 mg total) by mouth daily 30 tablet 5   • Lancets (OneTouch Delica Plus [id number]) MISC TEST BLOOD GLUCOSE ONCE DAILY 100 each 0   • losartan (COZAAR) 25 mg tablet Take 25 mg by mouth daily     • magnesium oxide (MAG-OX) 400 mg Take 1 tablet (400 mg total) by mouth in the morning.  180 tablet 2   • metoprolol tartrate (LOPRESSOR) 25 mg tablet Take 1 tablet (25 mg total) by mouth every 12 (twelve) hours 180 tablet 3   • Multiple Vitamins-Minerals (OCUVITE-LUTEIN PO) Take 1 tablet by mouth 2 (two) times a day Pt is taking preservision      • omeprazole (PriLOSEC) 40 MG capsule TAKE ONE CAPSULE BY MOUTH EVERY MORNING 90 capsule 3   • OneTouch Verio test strip TEST ONCE A  strip 0   • sodium bicarbonate 650 mg tablet Take 2 tablets (1,300 mg total) by mouth 2 (two) times a day 360 tablet 3   • torsemide (DEMADEX) 20 mg tablet Take 1 tablet (20 mg total) by mouth every other day 90 tablet 0   • traMADol (ULTRAM) 50 mg tablet TAKE ONE TABLET BY MOUTH EVERY 6 HOURS AS NEEDED FOR MODERATE PAIN 20 tablet 0   • Omega-3 Fatty Acids (Fish Oil) 1200 MG CPDR Take by mouth in the morning (Patient not taking: Reported on 8/8/2023)     • ondansetron (ZOFRAN) 4 mg tablet Take 1 tablet (4 mg total) by mouth every 8 (eight) hours as needed for nausea or vomiting (Patient not taking: Reported on 9/12/2023) 20 tablet 1     No current facility-administered medications for this visit. Allergies  Allergies   Allergen Reactions   • Fentanyl Hallucinations   • Morphine And Related Other (See Comments)     While having an MI patient received morphine and had adverse reaction but doesn't know what happened. Past Medical History, Social History, Family History, medications and allergies were reviewed. Vitals  Vitals:    09/12/23 1450   BP: 136/80   BP Location: Left arm   Patient Position: Sitting   Cuff Size: Adult   Pulse: (!) 52   SpO2: 97%   Weight: 83.5 kg (184 lb)       Physical Exam  Physical Exam  Examination he is in no acute distress. His abdomen is soft nontender nondistended. Midline incision well-healed. Stoma is pink and viable in the right mid abdomen. Gait is slow. Affect normal.    Results  Lab Results   Component Value Date    PSA 0.4 09/19/2018    PSA 0.4 10/04/2017    PSA 0.4 10/17/2016     Lab Results   Component Value Date    GLUCOSE 128 08/13/2015    CALCIUM 8.7 09/05/2023     08/13/2015    K 4.4 09/05/2023    CO2 25 09/05/2023     09/05/2023    BUN 45 (H) 09/05/2023    CREATININE 2.50 (H) 09/05/2023     Lab Results   Component Value Date    WBC 10.66 (H) 09/05/2023    HGB 11.0 (L) 09/05/2023    HCT 35.3 (L) 09/05/2023    MCV 95 09/05/2023     09/05/2023         Office Urine Dip  No results found for this or any previous visit (from the past 1 hour(s)). ]      {[id number]}

## 2023-09-12 NOTE — PROGRESS NOTES
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 He is amenable with this plan. He is scheduled for his next PET scan in 1 week. He will continue to follow with Dr. Aubree Casiano and continue on the IV immunotherapy infusions. He was instructed to call immediately if he has gross blood per urethra. This would be highly suggestive of recurrence within the urethra. He continues to follow with nephrology to see if hemodialysis for end-stage renal disease is necessary. They are holding at this time. History of Present Illness  80 y.o. 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 He underwent radical cystoprostatectomy with ileal conduit creation by Dr. Pepe Garvin at the [facility]. In the spring 2022 he had a urethral recurrence. I performed biopsy and fulguration in the operating room. He was also found to have iliac lymphadenopathy and a pelvic sidewall node that were PET positive. He received platinum based chemotherapy and has since been on maintenance immunotherapy. He returns for routine follow-up today. He denies any blood per urethra. His last urethroscopy was in January 2023        AUA Symptom Score      Review of Systems  Review of Systems   Constitutional: Negative. HENT: Negative. Eyes: Negative. Respiratory: Negative. Cardiovascular: Negative. Gastrointestinal: Negative. Endocrine: Negative. Genitourinary:        Per HPI   Musculoskeletal: Negative. Skin: Negative. Allergic/Immunologic: Negative. Neurological: Negative.     Hematological: Negative. Psychiatric/Behavioral: Negative.           Past Medical History  Past Medical History:   Diagnosis Date   • Acute kidney injury ([address])    • Anemia Jan. 2022   • Arthritis     Hands   • Wright esophagus    • BPH with obstruction/lower urinary tract symptoms    • CAD (coronary artery disease)     Last assessed 09/16/15   • Cancer ([address])     bladder   • Cataract, acquired     Last assessed 10/10/17   • Chronic kidney disease    • Coronary artery disease    • Diabetes mellitus ([address])     NIDDM   • Diabetic neuropathy ([address])     Feet   • Enlarged prostate with lower urinary tract symptoms (LUTS)     Last assessed 10/10/17   • Erectile dysfunction    • GERD (gastroesophageal reflux disease)     Last assessed 10/10/17   • Hemoptysis     Last assessed 03/14/16   • Hypercholesterolemia     Last assessed 10/10/17   • Hypertension     Last assessed 10/10/17   • Kidney stone    • Macular degeneration     Right eye is particularly affected-peripheral vision intact   • Myocardial infarction [facility]) 1998   • OAB (overactive bladder)    • Testicular hypofunction    • Testicular hypogonadism     Last assessed 10/10/17   • Tinnitus    • Umbilical hernia     Last assessed 06/18/14   • Urge incontinence of urine    • Wears glasses        Past Social History  Past Surgical History:   Procedure Laterality Date   • BLADDER SURGERY     • COLONOSCOPY     • CORONARY ARTERY BYPASS GRAFT  07/16/2014    ABG x 4 LIMA to LAD,SVG to diagnoal 2 SVG to OM-1, SVG to PDA, resection of partial plerual mass   • CT GUIDED PERC DRAINAGE CATHETER PLACEMENT  02/19/2021   • CYSTOSCOPY  2013   • CYSTOSCOPY  02/08/2022    [facility]   • ESOPHAGOGASTRODUODENOSCOPY     • FL RETROGRADE PYELOGRAM  12/20/2018   • FL RETROGRADE PYELOGRAM  12/05/2019   • INGUINAL HERNIA REPAIR Bilateral 2015   • IR DRAINAGE TUBE CHECK AND/OR REMOVAL  03/05/2021   • PHOTODYNAMIC THERAPY      For Barretts esophagus   • DE COLONOSCOPY FLX DX W/COLLJ SPEC WHEN PFRMD N/A 04/25/2016 Procedure: COLONOSCOPY;  Surgeon: Roxane Hayes MD;  Location: [facility]; Service: Gastroenterology   • ID CYSTO W/REMOVAL OF LESIONS SMALL N/A 12/05/2019    Procedure: CYSTO W/TURBT AND TRANSURETHRAL PROSTATE BIOPSY AND OPENING OF BLADDER NECK CONTRACTURE, B/L Retrograde pyelogram;  Surgeon: Pj Rodriguez MD;  Location[facility];  Service: Urology   • ID CYSTOURETHROSCOPY W/DEST &/RMVL MED BLADDER TY N/A 04/16/2020    Procedure: CYSTOSCOPY, TRANSURETHRAL RESECTION OF BLADDER TUMOR (TURBT);   Surgeon: Pj Rodriguez MD;  [facility];  Service: Urology   • ID CYSTOURETHROSCOPY WITH BIOPSY N/A 09/10/2020    Procedure: CYSTOSCOPY, COLLECTION OF LEFT KIDNEY CYTOLOGY, BILATERAL RETROGRADE PYELOGRAM, BLADDER WALL BIOPSIES  AND [address] TUMOR BIOPSIES;  Surgeon: Pj Rodriguez MD;  Location: [facility];  Service: Urology   • ID CYSTOURETHROSCOPY WITH BIOPSY N/A 04/05/2022    Procedure: urethroscopy , biopsy of urethra with fulgeration;  Surgeon: Duncan Molina MD;  Location:  MAIN OR;  Service: Urology   • PROSTATE SURGERY     • TONSILLECTOMY     • TRANSURETHRAL RESECTION OF PROSTATE N/A 12/20/2018    Procedure: CYSTO, PHOTO SELECTIVE VAPORIZATION OF PROSTATE, B/L RETROGRADE PYELOGRAM, TURBT;  Surgeon: Pj Rodriguez MD;  Location[facility];  Service: Urology   • UMBILICAL HERNIA REPAIR  2012   • UPPER GASTROINTESTINAL ENDOSCOPY         Past Family History  Family History   Problem Relation Age of Onset   • Cancer Mother         Topical oral abrasian caused cancer   • Other Mother         Digestive System Complications   • Diabetes Father    • Heart disease Father    • Hypertension Father    • Coronary artery disease Father    • Hyperlipidemia Father        Past Social history  Social History     Socioeconomic History   • Marital status: /Civil Union     Spouse name: Not on file   • Number of children: Not on file   • Years of education: Not on file   • Highest education level: Not on file   Occupational History   • Occupation: Sales position   Tobacco Use   • Smoking status: Former     Packs/day: 1.00     Years: 10.00     Total pack years: 10.00     Types: Cigarettes     Quit date: 1972     Years since quittin.7   • Smokeless tobacco: Never   • Tobacco comments:     Quit at age 28   Vaping Use   • Vaping Use: Never used   Substance and Sexual Activity   • Alcohol use: Not Currently     Alcohol/week: 2.0 standard drinks of alcohol     Types: 1 Glasses of wine, 1 Cans of beer per week     Comment: Non since 7/15/2020   • Drug use: Never   • Sexual activity: Not Currently     Partners: Female   Other Topics Concern   • Not on file   Social History Narrative    Always uses seatbelt        Caffeine use- Drinks 2 cups of coffee daily     Social Determinants of Health     Financial Resource Strain: Low Risk  (2022)    Overall Financial Resource Strain (CARDIA)    • Difficulty of Paying Living Expenses: Not very hard   Food Insecurity: Not on file   Transportation Needs: No Transportation Needs (2022)    PRAPARE - Transportation    • Lack of Transportation (Medical): No    • Lack of Transportation (Non-Medical):  No   Physical Activity: Not on file   Stress: Not on file   Social Connections: Not on file   Intimate Partner Violence: Not on file   Housing Stability: Not on file       Current Medications  Current Outpatient Medications   Medication Sig Dispense Refill   • acetaminophen (TYLENOL) 325 mg tablet Take 650 mg by mouth every 6 (six) hours as needed for mild pain     • atorvastatin (LIPITOR) 40 mg tablet TAKE ONE TABLET BY MOUTH AT BEDTIME 90 tablet 3   • Blood Glucose Monitoring Suppl (OneTouch Verio Flex System) w/Device KIT Use to check blood sugars daily 1 kit 0   • calcitriol (ROCALTROL) 0.25 mcg capsule Take 1 tablet 2 times a week (Monday, Friday) (Patient taking differently: Take 1 tablet 2 times a week (Tuesday, Friday)) 24 capsule 3   • Cholecalciferol (VITAMIN D) 50 MCG (2000 UT) tablet Take 2,000 Units by mouth daily     • citalopram (CeleXA) 20 mg tablet TAKE ONE TABLET BY MOUTH EVERY DAY 30 tablet 3   • Farxiga 5 MG TABS TAKE ONE TABLET BY MOUTH EVERY DAY 90 tablet 2   • ferrous sulfate 324 (65 Fe) mg Take 1 tablet (324 mg total) by mouth daily 30 tablet 5   • Lancets (OneTouch Delica Plus [id number]) MISC TEST BLOOD GLUCOSE ONCE DAILY 100 each 0   • losartan (COZAAR) 25 mg tablet Take 25 mg by mouth daily     • magnesium oxide (MAG-OX) 400 mg Take 1 tablet (400 mg total) by mouth in the morning. 180 tablet 2   • metoprolol tartrate (LOPRESSOR) 25 mg tablet Take 1 tablet (25 mg total) by mouth every 12 (twelve) hours 180 tablet 3   • Multiple Vitamins-Minerals (OCUVITE-LUTEIN PO) Take 1 tablet by mouth 2 (two) times a day Pt is taking preservision      • omeprazole (PriLOSEC) 40 MG capsule TAKE ONE CAPSULE BY MOUTH EVERY MORNING 90 capsule 3   • OneTouch Verio test strip TEST ONCE A  strip 0   • sodium bicarbonate 650 mg tablet Take 2 tablets (1,300 mg total) by mouth 2 (two) times a day 360 tablet 3   • torsemide (DEMADEX) 20 mg tablet Take 1 tablet (20 mg total) by mouth every other day 90 tablet 0   • traMADol (ULTRAM) 50 mg tablet TAKE ONE TABLET BY MOUTH EVERY 6 HOURS AS NEEDED FOR MODERATE PAIN 20 tablet 0   • Omega-3 Fatty Acids (Fish Oil) 1200 MG CPDR Take by mouth in the morning (Patient not taking: Reported on 8/8/2023)     • ondansetron (ZOFRAN) 4 mg tablet Take 1 tablet (4 mg total) by mouth every 8 (eight) hours as needed for nausea or vomiting (Patient not taking: Reported on 9/12/2023) 20 tablet 1     No current facility-administered medications for this visit. Allergies  Allergies   Allergen Reactions   • Fentanyl Hallucinations   • Morphine And Related Other (See Comments)     While having an MI patient received morphine and had adverse reaction but doesn't know what happened.        Past Medical History, Social History, Family History, medications and allergies were reviewed. Vitals  Vitals:    09/12/23 1450   BP: 136/80   BP Location: Left arm   Patient Position: Sitting   Cuff Size: Adult   Pulse: (!) 52   SpO2: 97%   Weight: 83.5 kg (184 lb)       Physical Exam  Physical Exam  Examination he is in no acute distress. His abdomen is soft nontender nondistended. Midline incision well-healed. Stoma is pink and viable in the right mid abdomen. Gait is slow.   Affect normal.    Results  Lab Results   Component Value Date    PSA 0.4 09/19/2018    PSA 0.4 10/04/2017    PSA 0.4 10/17/2016     Lab Results   Component Value Date    GLUCOSE 128 08/13/2015    CALCIUM 8.7 09/05/2023     08/13/2015    K 4.4 09/05/2023    CO2 25 09/05/2023     09/05/2023    BUN 45 (H) 09/05/2023    CREATININE 2.50 (H) 09/05/2023     Lab Results   Component Value Date    WBC 10.66 (H) 09/05/2023    HGB 11.0 (L) 09/05/2023    HCT 35.3 (L) 09/05/2023    MCV 95 09/05/2023     09/05/2023         Office Urine Dip  No results found for this or any previous visit (from the past 1 hour(s)).]

## 2023-09-15 ENCOUNTER — APPOINTMENT (EMERGENCY)
Dept: RADIOLOGY | Facility: HOSPITAL | Age: 83
DRG: 070 | End: 2023-09-15
Payer: MEDICARE

## 2023-09-15 ENCOUNTER — HOSPITAL ENCOUNTER (INPATIENT)
Facility: HOSPITAL | Age: 83
LOS: 4 days | DRG: 070 | End: 2023-09-19
Attending: EMERGENCY MEDICINE | Admitting: STUDENT IN AN ORGANIZED HEALTH CARE EDUCATION/TRAINING PROGRAM
Payer: MEDICARE

## 2023-09-15 ENCOUNTER — APPOINTMENT (INPATIENT)
Dept: NON INVASIVE DIAGNOSTICS | Facility: HOSPITAL | Age: 83
DRG: 070 | End: 2023-09-15
Payer: MEDICARE

## 2023-09-15 ENCOUNTER — APPOINTMENT (INPATIENT)
Dept: RADIOLOGY | Facility: HOSPITAL | Age: 83
DRG: 070 | End: 2023-09-15
Payer: MEDICARE

## 2023-09-15 DIAGNOSIS — I62.00 SUBDURAL HEMORRHAGE (HCC): ICD-10-CM

## 2023-09-15 DIAGNOSIS — S09.90XA TRAUMATIC INJURY OF HEAD, INITIAL ENCOUNTER: ICD-10-CM

## 2023-09-15 DIAGNOSIS — I48.3 TYPICAL ATRIAL FLUTTER (HCC): ICD-10-CM

## 2023-09-15 DIAGNOSIS — S06.5XAA SDH (SUBDURAL HEMATOMA) (HCC): ICD-10-CM

## 2023-09-15 DIAGNOSIS — I48.92 ATRIAL FLUTTER (HCC): ICD-10-CM

## 2023-09-15 DIAGNOSIS — N18.4 STAGE 4 CHRONIC KIDNEY DISEASE (HCC): ICD-10-CM

## 2023-09-15 DIAGNOSIS — H92.21 BLOOD IN RIGHT EAR CANAL: ICD-10-CM

## 2023-09-15 DIAGNOSIS — S02.19XA TEMPORAL BONE FRACTURE (HCC): ICD-10-CM
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
## 2023-09-15 DIAGNOSIS — G93.89 PNEUMOCEPHALUS, TRAUMATIC: Primary | ICD-10-CM

## 2023-09-15 PROBLEM — I60.9 SAH (SUBARACHNOID HEMORRHAGE) (HCC): Status: ACTIVE | Noted: 2023-09-15

## 2023-09-15 PROBLEM — S02.19XB OPEN FRACTURE OF TEMPORAL BONE (HCC): Status: ACTIVE | Noted: 2023-09-15

## 2023-09-15 LAB
2HR DELTA HS TROPONIN: 147 NG/L
4HR DELTA HS TROPONIN: 227 NG/L
ABO GROUP BLD: NORMAL
ALBUMIN SERPL BCP-MCNC: 3.4 G/DL (ref 3.5–5)
ALP SERPL-CCNC: 97 U/L (ref 34–104)
ALT SERPL W P-5'-P-CCNC: 15 U/L (ref 7–52)
ANION GAP SERPL CALCULATED.3IONS-SCNC: 15 MMOL/L
APTT PPP: 24 SECONDS (ref 23–37)
AST SERPL W P-5'-P-CCNC: 17 U/L (ref 13–39)
BASOPHILS # BLD AUTO: 0.04 THOUSANDS/ÂΜL (ref 0–0.1)
BASOPHILS NFR BLD AUTO: 0 % (ref 0–1)
BILIRUB SERPL-MCNC: 0.66 MG/DL (ref 0.2–1)
BLD GP AB SCN SERPL QL: NEGATIVE
BUN SERPL-MCNC: 57 MG/DL (ref 5–25)
CALCIUM ALBUM COR SERPL-MCNC: 9 MG/DL (ref 8.3–10.1)
CALCIUM SERPL-MCNC: 8.5 MG/DL (ref 8.4–10.2)
CARDIAC TROPONIN I PNL SERPL HS: 205 NG/L
CARDIAC TROPONIN I PNL SERPL HS: 285 NG/L
CARDIAC TROPONIN I PNL SERPL HS: 432 NG/L
CARDIAC TROPONIN I PNL SERPL HS: 58 NG/L
CHLORIDE SERPL-SCNC: 103 MMOL/L (ref 96–108)
CO2 SERPL-SCNC: 16 MMOL/L (ref 21–32)
CREAT SERPL-MCNC: 2.28 MG/DL (ref 0.6–1.3)
EOSINOPHIL # BLD AUTO: 0.09 THOUSAND/ÂΜL (ref 0–0.61)
EOSINOPHIL NFR BLD AUTO: 1 % (ref 0–6)
ERYTHROCYTE [DISTWIDTH] IN BLOOD BY AUTOMATED COUNT: 16.7 % (ref 11.6–15.1)
GFR SERPL CREATININE-BSD FRML MDRD: 25 ML/MIN/1.73SQ M
GLUCOSE SERPL-MCNC: 190 MG/DL (ref 65–140)
GLUCOSE SERPL-MCNC: 196 MG/DL (ref 65–140)
GLUCOSE SERPL-MCNC: 196 MG/DL (ref 65–140)
HCT VFR BLD AUTO: 31.4 % (ref 36.5–49.3)
HGB BLD-MCNC: 10.3 G/DL (ref 12–17)
IMM GRANULOCYTES # BLD AUTO: 0.11 THOUSAND/UL (ref 0–0.2)
IMM GRANULOCYTES NFR BLD AUTO: 1 % (ref 0–2)
INR PPP: 1.1 (ref 0.84–1.19)
LYMPHOCYTES # BLD AUTO: 1.03 THOUSANDS/ÂΜL (ref 0.6–4.47)
LYMPHOCYTES NFR BLD AUTO: 7 % (ref 14–44)
MCH RBC QN AUTO: 29.8 PG (ref 26.8–34.3)
MCHC RBC AUTO-ENTMCNC: 32.8 G/DL (ref 31.4–37.4)
MCV RBC AUTO: 91 FL (ref 82–98)
MONOCYTES # BLD AUTO: 1.23 THOUSAND/ÂΜL (ref 0.17–1.22)
MONOCYTES NFR BLD AUTO: 8 % (ref 4–12)
NEUTROPHILS # BLD AUTO: 12.54 THOUSANDS/ÂΜL (ref 1.85–7.62)
NEUTS SEG NFR BLD AUTO: 83 % (ref 43–75)
NRBC BLD AUTO-RTO: 0 /100 WBCS
PLATELET # BLD AUTO: 233 THOUSANDS/UL (ref 149–390)
PMV BLD AUTO: 11.4 FL (ref 8.9–12.7)
POTASSIUM SERPL-SCNC: 3.8 MMOL/L (ref 3.5–5.3)
PROT SERPL-MCNC: 6.7 G/DL (ref 6.4–8.4)
PROTHROMBIN TIME: 14.4 SECONDS (ref 11.6–14.5)
RBC # BLD AUTO: 3.46 MILLION/UL (ref 3.88–5.62)
RH BLD: POSITIVE
SODIUM SERPL-SCNC: 134 MMOL/L (ref 135–147)
SPECIMEN EXPIRATION DATE: NORMAL
WBC # BLD AUTO: 15.04 THOUSAND/UL (ref 4.31–10.16)

## 2023-09-15 PROCEDURE — 99291 CRITICAL CARE FIRST HOUR: CPT | Performed by: STUDENT IN AN ORGANIZED HEALTH CARE EDUCATION/TRAINING PROGRAM

## 2023-09-15 PROCEDURE — 80053 COMPREHEN METABOLIC PANEL: CPT | Performed by: EMERGENCY MEDICINE

## 2023-09-15 PROCEDURE — 93325 DOPPLER ECHO COLOR FLOW MAPG: CPT

## 2023-09-15 PROCEDURE — 93308 TTE F-UP OR LMTD: CPT

## 2023-09-15 PROCEDURE — 93308 TTE F-UP OR LMTD: CPT | Performed by: INTERNAL MEDICINE

## 2023-09-15 PROCEDURE — 93321 DOPPLER ECHO F-UP/LMTD STD: CPT | Performed by: INTERNAL MEDICINE

## 2023-09-15 PROCEDURE — 85025 COMPLETE CBC W/AUTO DIFF WBC: CPT | Performed by: EMERGENCY MEDICINE

## 2023-09-15 PROCEDURE — 74177 CT ABD & PELVIS W/CONTRAST: CPT

## 2023-09-15 PROCEDURE — 93005 ELECTROCARDIOGRAM TRACING: CPT

## 2023-09-15 PROCEDURE — 85730 THROMBOPLASTIN TIME PARTIAL: CPT

## 2023-09-15 PROCEDURE — 86850 RBC ANTIBODY SCREEN: CPT

## 2023-09-15 PROCEDURE — 70450 CT HEAD/BRAIN W/O DYE: CPT

## 2023-09-15 PROCEDURE — 84484 ASSAY OF TROPONIN QUANT: CPT | Performed by: EMERGENCY MEDICINE

## 2023-09-15 PROCEDURE — 86900 BLOOD TYPING SEROLOGIC ABO: CPT

## 2023-09-15 PROCEDURE — 86901 BLOOD TYPING SEROLOGIC RH(D): CPT

## 2023-09-15 PROCEDURE — 82948 REAGENT STRIP/BLOOD GLUCOSE: CPT

## 2023-09-15 PROCEDURE — 71045 X-RAY EXAM CHEST 1 VIEW: CPT

## 2023-09-15 PROCEDURE — 90715 TDAP VACCINE 7 YRS/> IM: CPT | Performed by: EMERGENCY MEDICINE

## 2023-09-15 PROCEDURE — 36415 COLL VENOUS BLD VENIPUNCTURE: CPT | Performed by: EMERGENCY MEDICINE

## 2023-09-15 PROCEDURE — 71260 CT THORAX DX C+: CPT

## 2023-09-15 PROCEDURE — 99291 CRITICAL CARE FIRST HOUR: CPT | Performed by: EMERGENCY MEDICINE

## 2023-09-15 PROCEDURE — 72125 CT NECK SPINE W/O DYE: CPT

## 2023-09-15 PROCEDURE — G1004 CDSM NDSC: HCPCS

## 2023-09-15 PROCEDURE — 90471 IMMUNIZATION ADMIN: CPT

## 2023-09-15 PROCEDURE — 72131 CT LUMBAR SPINE W/O DYE: CPT

## 2023-09-15 PROCEDURE — 96360 HYDRATION IV INFUSION INIT: CPT

## 2023-09-15 PROCEDURE — 72128 CT CHEST SPINE W/O DYE: CPT

## 2023-09-15 PROCEDURE — 99285 EMERGENCY DEPT VISIT HI MDM: CPT

## 2023-09-15 PROCEDURE — 84484 ASSAY OF TROPONIN QUANT: CPT

## 2023-09-15 PROCEDURE — 93325 DOPPLER ECHO COLOR FLOW MAPG: CPT | Performed by: INTERNAL MEDICINE

## 2023-09-15 PROCEDURE — 85610 PROTHROMBIN TIME: CPT

## 2023-09-15 RX ORDER — HYDROMORPHONE HCL IN WATER/PF 6 MG/30 ML
0.2 PATIENT CONTROLLED ANALGESIA SYRINGE INTRAVENOUS EVERY 6 HOURS PRN
Status: DISCONTINUED | OUTPATIENT
Start: 2023-09-15 | End: 2023-09-19 | Stop reason: HOSPADM

## 2023-09-15 RX ORDER — PANTOPRAZOLE SODIUM 40 MG/1
40 TABLET, DELAYED RELEASE ORAL
Status: DISCONTINUED | OUTPATIENT
Start: 2023-09-16 | End: 2023-09-19 | Stop reason: HOSPADM

## 2023-09-15 RX ORDER — ACETAMINOPHEN 325 MG/1
650 TABLET ORAL EVERY 6 HOURS PRN
Status: DISCONTINUED | OUTPATIENT
Start: 2023-09-15 | End: 2023-09-18

## 2023-09-15 RX ORDER — OFLOXACIN 3 MG/ML
5 SOLUTION/ DROPS OPHTHALMIC 4 TIMES DAILY
Status: DISCONTINUED | OUTPATIENT
Start: 2023-09-15 | End: 2023-09-19 | Stop reason: HOSPADM

## 2023-09-15 RX ORDER — LEVETIRACETAM 500 MG/1
500 TABLET ORAL EVERY 12 HOURS SCHEDULED
Status: DISCONTINUED | OUTPATIENT
Start: 2023-09-15 | End: 2023-09-15

## 2023-09-15 RX ORDER — OXYMETAZOLINE HYDROCHLORIDE 0.05 G/100ML
2 SPRAY NASAL ONCE
Status: COMPLETED | OUTPATIENT
Start: 2023-09-15 | End: 2023-09-15

## 2023-09-15 RX ORDER — INSULIN LISPRO 100 [IU]/ML
1-6 INJECTION, SOLUTION INTRAVENOUS; SUBCUTANEOUS
Status: DISCONTINUED | OUTPATIENT
Start: 2023-09-15 | End: 2023-09-19 | Stop reason: HOSPADM

## 2023-09-15 RX ORDER — ONDANSETRON 2 MG/ML
INJECTION INTRAMUSCULAR; INTRAVENOUS
Status: COMPLETED
Start: 2023-09-15 | End: 2023-09-15

## 2023-09-15 RX ORDER — CITALOPRAM 20 MG/1
20 TABLET ORAL DAILY
Status: DISCONTINUED | OUTPATIENT
Start: 2023-09-15 | End: 2023-09-19 | Stop reason: HOSPADM

## 2023-09-15 RX ORDER — TORSEMIDE 20 MG/1
20 TABLET ORAL EVERY OTHER DAY
Status: DISCONTINUED | OUTPATIENT
Start: 2023-09-15 | End: 2023-09-15

## 2023-09-15 RX ORDER — ATORVASTATIN CALCIUM 40 MG/1
40 TABLET, FILM COATED ORAL
Status: DISCONTINUED | OUTPATIENT
Start: 2023-09-15 | End: 2023-09-19 | Stop reason: HOSPADM

## 2023-09-15 RX ORDER — ONDANSETRON 2 MG/ML
4 INJECTION INTRAMUSCULAR; INTRAVENOUS EVERY 6 HOURS PRN
Status: DISCONTINUED | OUTPATIENT
Start: 2023-09-15 | End: 2023-09-19 | Stop reason: HOSPADM

## 2023-09-15 RX ORDER — OXYCODONE HYDROCHLORIDE 5 MG/1
5 TABLET ORAL EVERY 8 HOURS PRN
Status: DISCONTINUED | OUTPATIENT
Start: 2023-09-15 | End: 2023-09-19 | Stop reason: HOSPADM

## 2023-09-15 RX ORDER — ONDANSETRON 2 MG/ML
1 INJECTION INTRAMUSCULAR; INTRAVENOUS ONCE
Status: COMPLETED | OUTPATIENT
Start: 2023-09-15 | End: 2023-09-15

## 2023-09-15 RX ADMIN — IOHEXOL 100 ML: 350 INJECTION, SOLUTION INTRAVENOUS at 15:18

## 2023-09-15 RX ADMIN — SODIUM CHLORIDE 500 ML: 0.9 INJECTION, SOLUTION INTRAVENOUS at 13:07

## 2023-09-15 RX ADMIN — LEVETIRACETAM 500 MG: 100 INJECTION, SOLUTION INTRAVENOUS at 21:22

## 2023-09-15 RX ADMIN — ONDANSETRON 4 MG: 2 INJECTION INTRAMUSCULAR; INTRAVENOUS at 17:11

## 2023-09-15 RX ADMIN — OFLOXACIN 5 DROP: 3 SOLUTION OPHTHALMIC at 23:58

## 2023-09-15 RX ADMIN — ONDANSETRON 4 MG: 2 INJECTION INTRAMUSCULAR; INTRAVENOUS at 12:49

## 2023-09-15 RX ADMIN — TETANUS TOXOID, REDUCED DIPHTHERIA TOXOID AND ACELLULAR PERTUSSIS VACCINE, ADSORBED 0.5 ML: 5; 2.5; 8; 8; 2.5 SUSPENSION INTRAMUSCULAR at 15:07

## 2023-09-15 RX ADMIN — ATORVASTATIN CALCIUM 40 MG: 40 TABLET, FILM COATED ORAL at 21:29

## 2023-09-15 RX ADMIN — SODIUM CHLORIDE 3 G: 9 INJECTION, SOLUTION INTRAVENOUS at 15:08

## 2023-09-15 RX ADMIN — OXYMETAZOLINE HYDROCHLORIDE 2 SPRAY: 0.05 SPRAY NASAL at 13:51

## 2023-09-15 RX ADMIN — ACETAMINOPHEN 650 MG: 325 TABLET, FILM COATED ORAL at 21:37

## 2023-09-15 NOTE — ED NOTES
Dr. Saez Asp aware of elevated Bps. No new interventions at this time.  Provider at bedside      Latoya Medina, [address]  09/15/23 [id number]

## 2023-09-15 NOTE — ASSESSMENT & PLAN NOTE
Temporal bone fracture noted on CT 9/15, with associated subdural hematoma, subarachnoid hemorrhage. Noted continued bleeding from right ear s/p administration of afrin  - Neuro exam: stable  - Continue neuro checks  - Appreciate neurosurgery and ENT recommendations  - Unasyn for treatment of open fracture  - Complete 7 day course of Keppra for seizure prophylaxis  - Chemical DVT prophylaxis: Not cleared for chemical prophylaxis by neurosurgery at this time. Continue SCDs bilaterally.    - Hold all anticoagulants and anti platelet medications until cleared by neurosurgery to resume  - PT/OT and cognitive evaluation

## 2023-09-15 NOTE — CONSULTS
[address] [facility]  Consult  Name: Esteban Puentes 80 y.o. male I MRN: [id number]  Unit/Bed#: [facility] 697-23 I Date of Admission: 9/15/2023   Date of Service: 9/15/2023 I Hospital Day: 0    Inpatient consult to Neurosurgery  Consult performed by: Negrito Hebert PA-C  Consult ordered by: Manjeet Mansfield MD        Assessment/Plan   SDH (subdural hematoma) [facility])  Assessment & Plan  Acute multi-compartment hemorrhage, SAH and SDH   R temporal bone fracture   Small pneumocephalus   - presents to [facility] ER after a fall backwards with head strike to asphalt   - no LOC   - no AC/AP meds   - current exam: GCS 15, neuro intact. Trace bloody drainage from R ear. Imaging:   · 9/15 CT temporal bones: RIGHT TEMPORAL BONE CT: Acute comminuted otic sparing right temporal bone fracture involving the squamosal, mastoid, and tympanic portions with diastases of occipitotemporal suture and extension of fracture into visualized right occipital calvarium. Recommend follow-up CTV head with contrast to assess underlying patency of right dural venous sinuses. Probable small-to-moderate RIGHT-sided bloody mastoid effusion, large bloody middle ear effusion, and probable debris and blood products throughout external auditory canal. Small scalp hematoma in right occipito-temporal region. LEFT TEMPORAL BONE CT: Trace left mastoid effusion. Otherwise, normal LEFT temporal bone CT.  · 9/15 CT c-spine: No acute compression collapse of the vetebra. Fracture of the right temporal bone, incompletely assessed  · 9/15 CTH: Multicompartment hemorrhage with small foci of subarachnoid hemorrhage in the bilateral inferior frontal region. Small foci of subdural hemorrhage in the right insular region and temporal region without significant mass effect. Additional small foci of subarachnoid hemorrhage versus small contusions in the right temporal region.  Air noted within the right sigmoid sinus, transverse sinus with associated fracture of the right temporal bone with small foci of extra-axial air in the right middle cranial fossa and hemorrhage in the right middle ear. Nondisplaced fracture through the right occipital bone    Plan:   · Continue to closely monitor neuro exam   · Frequent neuro checks per primary team   · repeat STAT CTH with any acute decline in GCS > 2pts or more in 1 hr   · Maintain normotensive BP goals, SBP < 160   · No acute neurosurgical intervention indicated at this time   · Case and imaging reviewed   · Pt remains GCS 15, neuro intact   · Recommend close neuro-monitoring and close follow-up with repeat CTH   · Plan for repeat CTH in the am   · Agree with plan for CTA/CTV for further evaluation   · Continue keppra per trauma team protocol   · Hold all AC/AP meds --> not taking at home   · Please obtain baseline coags, PT/INR and PTT to ensure no coagulopathy   · DVT ppx: SCDs, hold chem ppx until cleared by nsgy team   · ENT consulted for temporal bone fracture, appreciate recs   · Continue medical management per primary team   · Pain control per primary team   · PT/OT   · Social work following for assistance with dispo once medically cleared     Neurosurgery will continue to follow. Please reach out with any further questions or concerns. SAH (subarachnoid hemorrhage) ([facility])  Assessment & Plan  - see above     Pneumocephalus  Assessment & Plan  - see above     Open fracture of temporal bone [facility])  Assessment & Plan  - ENT following  - appreciate recs   - see above        History of Present Illness   HPI: Amena Hamilton is a 80y.o. 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   Patient states he was moving a large wooden plank into his truck when he fell backward and struck his head on asphalt. Patient denies any LOC. Patient states his wife and neighbor who is a nurse witnessed his fall. His neighbor recommended they call and ambulance for ER evaluation. Upon EMS arrival, pt was noted to be nauseous with reported vomiting en route to the ER. Improved with medications. Upon arrival to the ER pt noted to have evidence of head trauma. CT imaging revealed multicompartment hemorrhage with R temporal bone fracture as well as small amount of pneumocephalus. Pt noted to have bleeding from his R ear and reports a "fullness" sensation to the R ear. No reported neuro-deficits on arrival.  Patient not on any AC or AP medications. At this time, pt admits to a mild HA. States his nausea has improved. Denies any dizziness, vision changes, persistent vomiting, weakness, numbness, seizure-like activity, LOC, back pain, neck pain. Review of Systems   HENT: Positive for ear discharge (Bleeding from right ear) and hearing loss (Right ear fullness sensation). Negative for nosebleeds, rhinorrhea, sinus pressure, sinus pain and tinnitus. Eyes: Positive for visual disturbance (Baseline poor vision with macular degeneration and cataracts bilaterally). Respiratory: Negative for cough and shortness of breath. Cardiovascular: Negative for chest pain. Gastrointestinal: Positive for nausea and vomiting. Negative for abdominal pain and diarrhea. Musculoskeletal: Positive for myalgias. Negative for arthralgias, back pain, gait problem, joint swelling, neck pain and neck stiffness. Skin: Positive for wound (Posterior head abrasion). Neurological: Positive for headaches. Negative for dizziness, tremors, seizures, syncope, facial asymmetry, speech difficulty, weakness, light-headedness and numbness. Psychiatric/Behavioral: Negative for agitation, behavioral problems, confusion and decreased concentration. The patient is not nervous/anxious.       Historical Information   Past Medical History:   Diagnosis Date   • Acute kidney injury [facility])    • Anemia Jan. 2022   • Arthritis     Hands   • Wright esophagus    • BPH with obstruction/lower urinary tract symptoms    • CAD (coronary artery disease)     Last assessed 09/16/15   • Cancer [facility])     bladder   • Cataract, acquired     Last assessed 10/10/17   • Chronic kidney disease    • Coronary artery disease    • Diabetes mellitus ([address])     NIDDM   • Diabetic neuropathy ([address])     Feet   • Enlarged prostate with lower urinary tract symptoms (LUTS)     Last assessed 10/10/17   • Erectile dysfunction    • GERD (gastroesophageal reflux disease)     Last assessed 10/10/17   • Hemoptysis     Last assessed 03/14/16   • Hypercholesterolemia     Last assessed 10/10/17   • Hypertension     Last assessed 10/10/17   • Kidney stone    • Macular degeneration     Right eye is particularly affected-peripheral vision intact   • Myocardial infarction [facility]) 1998   • OAB (overactive bladder)    • Testicular hypofunction    • Testicular hypogonadism     Last assessed 10/10/17   • Tinnitus    • Umbilical hernia     Last assessed 06/18/14   • Urge incontinence of urine    • Wears glasses      Past Surgical History:   Procedure Laterality Date   • BLADDER SURGERY     • COLONOSCOPY     • CORONARY ARTERY BYPASS GRAFT  07/16/2014    ABG x 4 LIMA to LAD,SVG to diagnoal 2 SVG to OM-1, SVG to PDA, resection of partial plerual mass   • CT GUIDED PERC DRAINAGE CATHETER PLACEMENT  02/19/2021   • CYSTOSCOPY  2013   • CYSTOSCOPY  02/08/2022    Olya   • ESOPHAGOGASTRODUODENOSCOPY     • FL RETROGRADE PYELOGRAM  12/20/2018   • FL RETROGRADE PYELOGRAM  12/05/2019   • INGUINAL HERNIA REPAIR Bilateral 2015   • IR DRAINAGE TUBE CHECK AND/OR REMOVAL  03/05/2021   • PHOTODYNAMIC THERAPY      For Barretts esophagus   • CT COLONOSCOPY FLX DX W/COLLJ SPEC WHEN PFRMD N/A 04/25/2016    Procedure: COLONOSCOPY;  Surgeon: Shaila Irving MD;  Location: [facility];   Service: Gastroenterology   • CT CYSTO W/REMOVAL OF LESIONS SMALL N/A 2019    Procedure: CYSTO W/TURBT AND TRANSURETHRAL PROSTATE BIOPSY AND OPENING OF BLADDER NECK CONTRACTURE, B/L Retrograde pyelogram;  Surgeon: Michoacano Aguila MD;  Location: [facility];  Service: Urology   • MD CYSTOURETHROSCOPY W/DEST &/RMVL MED BLADDER TY N/A 2020    Procedure: CYSTOSCOPY, TRANSURETHRAL RESECTION OF BLADDER TUMOR (TURBT);   Surgeon: Michoacano Aguila MD;  Location[facility];  Service: Urology   • MD CYSTOURETHROSCOPY WITH BIOPSY N/A 09/10/2020    Procedure: CYSTOSCOPY, COLLECTION OF LEFT KIDNEY CYTOLOGY, BILATERAL RETROGRADE PYELOGRAM, BLADDER WALL BIOPSIES  AND FULGERAION, RANDOM BLADDER TUMOR BIOPSIES;  Surgeon: Michoacano Aguila MD;  Location: [address] [place] [address] MAIN OR;  Service: Urology   • MD CYSTOURETHROSCOPY WITH BIOPSY N/A 2022    Procedure: urethroscopy , biopsy of urethra with fulgeration;  Surgeon: Renan Lugo MD;  Location:  MAIN OR;  Service: Urology   • PROSTATE SURGERY     • TONSILLECTOMY     • TRANSURETHRAL RESECTION OF PROSTATE N/A 2018    Procedure: CYSTO, PHOTO SELECTIVE VAPORIZATION OF PROSTATE, B/L RETROGRADE PYELOGRAM, TURBT;  Surgeon: Michoacano Aguila MD;  Location: [facility];  Service: Urology   • UMBILICAL HERNIA REPAIR     • UPPER GASTROINTESTINAL ENDOSCOPY       Social History     Substance and Sexual Activity   Alcohol Use Not Currently   • Alcohol/week: 2.0 standard drinks of alcohol   • Types: 1 Glasses of wine, 1 Cans of beer per week    Comment: Non since 7/15/2020     Social History     Substance and Sexual Activity   Drug Use Never     Social History     Tobacco Use   Smoking Status Former   • Packs/day: 1.00   • Years: 10.00   • Total pack years: 10.00   • Types: Cigarettes   • Quit date: 1972   • Years since quittin.7   Smokeless Tobacco Never   Tobacco Comments    Quit at age 28     Family History   Problem Relation Age of Onset   • Cancer Mother         Topical oral abrasian caused cancer   • Other Mother Digestive System Complications   • Diabetes Father    • Heart disease Father    • Hypertension Father    • Coronary artery disease Father    • Hyperlipidemia Father      Meds/Allergies   all current active meds have been reviewed  Allergies   Allergen Reactions   • Fentanyl Hallucinations   • Morphine And Related Other (See Comments)     While having an MI patient received morphine and had adverse reaction but doesn't know what happened. Objective   I/O       09/13 0701  09/14 0700 09/14 0701  09/15 0700 09/15 0701  09/16 0700    IV Piggyback   500    Total Intake   500    Urine   600    Total Output   600    Net   -100               Physical Exam  Constitutional:       General: He is not in acute distress. Appearance: Normal appearance. He is normal weight. He is not ill-appearing or toxic-appearing. Comments: Pleasant, elderly male lying flat and comfortable in bed  No acute distress   HENT:      Head: Normocephalic. Comments: Posterior scalp abrasion, no significant bleeding noted  Right EAC with small amount of blood, trace bloody drainage coming from the right ear  No drainage noted on the left     Left Ear: External ear normal.      Nose: Nose normal. No congestion or rhinorrhea. Mouth/Throat:      Mouth: Mucous membranes are moist.   Eyes:      General: No scleral icterus. Right eye: No discharge. Left eye: No discharge. Extraocular Movements: Extraocular movements intact. Conjunctiva/sclera: Conjunctivae normal.      Pupils: Pupils are equal, round, and reactive to light. Neck:      Comments: No posterior C-spine tenderness  Full ROM noted to C-spine  Cardiovascular:      Rate and Rhythm: Normal rate. Pulmonary:      Effort: Pulmonary effort is normal. No respiratory distress. Comments: No respiratory distress on room air  Abdominal:      General: Abdomen is flat. Musculoskeletal:         General: No tenderness, deformity or signs of injury. Normal range of motion. Cervical back: Normal range of motion and neck supple. No rigidity or tenderness. Right lower leg: No edema. Left lower leg: No edema. Comments: No posterior T or L-spine tenderness appreciated   Skin:     General: Skin is warm and dry. Capillary Refill: Capillary refill takes less than 2 seconds. Findings: Bruising and lesion present. Comments: See above   Neurological:      General: No focal deficit present. Mental Status: He is alert and oriented to person, place, and time. Mental status is at baseline. Cranial Nerves: No cranial nerve deficit. Sensory: No sensory deficit. Motor: No weakness. Coordination: Coordination normal.      Gait: Gait normal.      Comments: GCS 15   A&Ox3   Appropriately answering questions and following commands   No dysarthria or aphasia   No appreciated CN deficits   Strength 5/5 throughout to raymond UE and raymond LE   Sensation intact to light touch to raymond UE and raymond LE   No drift or ataxia appreciated raymond      Psychiatric:         Mood and Affect: Mood normal.         Behavior: Behavior normal.         Thought Content: Thought content normal.         Judgment: Judgment normal.       Neurologic Exam     Mental Status   Oriented to person, place, and time. Cranial Nerves     CN III, IV, VI   Pupils are equal, round, and reactive to light. Vitals:Blood pressure (!) 180/77, pulse 63, temperature (!) 96.7 °F (35.9 °C), resp. rate 19, SpO2 99 %. ,There is no height or weight on file to calculate BMI.      Lab Results:   Results from last 7 days   Lab Units 09/15/23  1306   WBC Thousand/uL 15.04*   HEMOGLOBIN g/dL 10.3*   HEMATOCRIT % 31.4*   PLATELETS Thousands/uL 233   NEUTROS PCT % 83*   MONOS PCT % 8   EOS PCT % 1     Results from last 7 days   Lab Units 09/15/23  1306   POTASSIUM mmol/L 3.8   CHLORIDE mmol/L 103   CO2 mmol/L 16*   BUN mg/dL 57*   CREATININE mg/dL 2.28*   CALCIUM mg/dL 8.5   ALK PHOS U/L 97   ALT U/L 15   AST U/L 17                 No results found for: "TROPONINT"  ABG:  Lab Results   Component Value Date    PHART 7.321 (L) 07/17/2014    TVY2FZR 42.0 07/17/2014    PO2ART 87.8 07/17/2014    TNS5OHO 21 (L) 07/17/2014    BEART -4.6 07/17/2014     Imaging Studies: I have personally reviewed pertinent reports. and I have personally reviewed pertinent films in PACS  CT recon (no charge)    Result Date: 9/15/2023  Narrative: CT TEMPORAL BONES WITHOUT CONTRAST INDICATION:   fall concern specifically for base of skull fx. . COMPARISON: Same day CT head and cervical spine without contrast TECHNIQUE: Using a multi-detector scanner, 0.625 mm axial scans of the temporal bone were acquired using a high-resolution bone technique. Targeted reconstructions were obtained of each side, including axial, coronal, and oblique views. All reconstructed images were reviewed. Soft tissue reconstructions were performed as well. Radiation dose length product (DLP) for this visit:  0 mGy-cm . This examination, like all CT scans performed in the [facility], was performed utilizing techniques to minimize radiation dose exposure, including the use of iterative reconstruction and automated exposure control. IV Contrast: None IMAGE QUALITY:  Diagnostic. FINDINGS: RIGHT TEMPORAL BONE: Acute comminuted otic sparing right temporal bone fracture involving the squamosal, mastoid, and tympanic portions with diastases of occipitotemporal suture and extension of fracture into visualized right occipital calvarium. PERIAURICULAR SOFT TISSUES: Small scalp hematoma in right occipito-temporal region. EXTERNAL AUDITORY CANAL: Debris in right external auditory canal likely containing some blood products. MIDDLE EAR: Large middle ear effusion involving epitympanum, mesotympanum, and hypotympanum - likely containing blood products. OSSICLES: Middle ear ossicles are intact. No evidence of dislocation.  MASTOID AIR CELLS: Please see above discussion regarding fracture. Small-to-moderate right mastoid effusion with blood products. COCHLEA: Normal. OTIC CAPSULE: Normal mineralization. VESTIBULE: Normal. VESTIBULAR AND COCHLEAR AQUEDUCT: Normal SEMICIRCULAR CANALS: Normal. INTERNAL AUDITORY CANAL: Normal. FACIAL NERVE CANAL: Normal. CAROTID CANAL: Normal. JUGULAR FORAMEN: Normal. LEFT TEMPORAL BONE: PERIAURICULAR SOFT TISSUES:  Normal. EXTERNAL AUDITORY CANAL: Normal. MIDDLE EAR: Normal. OSSICLES:Normal. MASTOID AIR CELLS: Trace left mastoid effusion. Otherwise, normal. COCHLEA: Normal. OTIC CAPSULE: Normal mineralization. VESTIBULE: Normal. VESTIBULAR AND COCHLEAR AQUEDUCT: Normal SEMICIRCULAR CANALS: Normal. INTERNAL AUDITORY CANAL: Normal. FACIAL NERVE CANAL: Normal. CAROTID CANAL: Normal. JUGULAR FORAMEN: Normal. OTHER FINDINGS: Please see same day CT head without contrast for further evaluation especially given right-sided subarachnoid hemorrhage and small volume pneumocephalus. Impression: RIGHT TEMPORAL BONE CT - Acute comminuted otic sparing right temporal bone fracture involving the squamosal, mastoid, and tympanic portions with diastases of occipitotemporal suture and extension of fracture into visualized right occipital calvarium. Recommend follow-up CTV head with contrast to assess underlying patency of right dural venous sinuses. - Probable small-to-moderate RIGHT-sided bloody mastoid effusion, large bloody middle ear effusion, and probable debris and blood products throughout external auditory canal. - Small scalp hematoma in right occipito-temporal region. LEFT TEMPORAL BONE CT -Trace left mastoid effusion. Otherwise, normal LEFT temporal bone CT. Please see same day CT head without contrast for further evaluation especially given right-sided subarachnoid hemorrhage and small volume pneumocephalus. The study was marked in [facility] for immediate notification.  Workstation performed: [id number]     CT head without contrast    Result Date: 9/15/2023  Narrative: CT BRAIN - WITHOUT CONTRAST INDICATION:   Head trauma, minor (Age >= 65y) occipital trauma. COMPARISON:  None. TECHNIQUE:  CT examination of the brain was performed. Multiplanar 2D reformatted images were created from the source data. Radiation dose length product (DLP) for this visit:  1033.52 mGy-cm . This examination, like all CT scans performed in the [address] [place] [address], was performed utilizing techniques to minimize radiation dose exposure, including the use of iterative reconstruction and automated exposure control. IMAGE QUALITY:  Diagnostic. FINDINGS: PARENCHYMA: There is small amount of right frontal extra-axial hemorrhage in the subdural region with associated subarachnoid hemorrhage in the inferior right frontal region, image 34 series 3 additional area of extra-axial hemorrhage right insular region, measuring about 6 mm Small areas of hemorrhagic contusion versus foci of additional subarachnoid hemorrhage in the left temporal region, image 29 series 3. Small foci of subarachnoid hemorrhage in the inferior left frontal region, image 32 series 3 There is no midline shift. VENTRICLES AND EXTRA-AXIAL SPACES: Ventricles appear unremarkable. There is no intraventricular hemorrhage seen Air noted within the right sigmoid sinus and the right transverse sinus with associated fracture of the right temporal bone and right occipital bone with small amount of subdural air VISUALIZED ORBITS: Normal visualized orbits. PARANASAL SINUSES: No air-fluid level seen in the paranasal sinuses Soft tissue swelling over the right temporal region CALVARIUM AND EXTRACRANIAL SOFT TISSUES: Hematoma in the left parietal vertex and left occipital region Nondisplaced fracture through the right temporal bone. Linear nondisplaced fracture through the posterior wall of the condyle with the right middle ear hemorrhage.  Fracture passes through the hypotympanum Nondisplaced fracture through the right occipital bone    Impression: Multicompartment hemorrhage with small foci of subarachnoid hemorrhage in the bilateral inferior frontal region. Small foci of subdural hemorrhage in the right insular region and temporal region without significant mass effect Additional small foci of subarachnoid hemorrhage versus small contusions in the right temporal region Air noted within the right sigmoid sinus, transverse sinus with associated fracture of the right temporal bone with small foci of extra-axial air in the right middle cranial fossa and hemorrhage in the right middle ear. Nondisplaced fracture through the right occipital bone I personally discussed this study with Becky Godoy on 9/15/2023 3:01 PM. Workstation performed: [id number]     CT cervical spine without contrast    Result Date: 9/15/2023  Narrative: CT CERVICAL SPINE - WITHOUT CONTRAST INDICATION:   Neck trauma (Age >= 65y) fall, [place]. COMPARISON:  None. TECHNIQUE:  CT examination of the cervical spine was performed without intravenous contrast.  Contiguous axial images were obtained. Multiplanar 2D reformatted images were created from the source data. Radiation dose length product (DLP) for this visit:  451.15 mGy-cm . This examination, like all CT scans performed in the [facility], was performed utilizing techniques to minimize radiation dose exposure, including the use of iterative  reconstruction and automated exposure control. IMAGE QUALITY:  Diagnostic. FINDINGS: ALIGNMENT:  Normal alignment of the cervical spine. No subluxation. VERTEBRAE:  No fracture. DEGENERATIVE CHANGES: Degenerative changes seen at the atlantoaxial joint. C2-3 level demonstrates ridging with uncovertebral spurring with no significant right or left foraminal narrowing C3-4 demonstrates uncovertebral spurring with no significant right and moderate left foraminal narrowing.  C4-5 demonstrates uncovertebral spurring with moderate to severe right foraminal narrowing and no significant left foraminal narrowing C5-6 demonstrates ridging with uncovertebral spurring with mild left and no significant right foraminal narrowing C6/7 demonstrates ridging with moderate left and mild right foraminal narrowing and mild central canal narrowing C7-T1 level appear unremarkable PREVERTEBRAL AND PARASPINAL SOFT TISSUES: Unremarkable Right thyroid nodule seen which measures about 1.8 cm THORACIC INLET:  Normal.     Impression: No acute compression collapse of the vetebra Fracture of the right temporal bone, incompletely assessed Incidental 1.8 cm right thyroid nodule, meets the size/criteria for further assessment with ultrasound for an incidentally detected nodule Workstation performed: [id number]     XR chest 1 view portable    Result Date: 9/15/2023  Narrative: CHEST INDICATION:   Cough. COMPARISON: Chest radiograph April 11, 2023 EXAM PERFORMED/VIEWS:  XR CHEST PORTABLE FINDINGS: There are median sternotomy wires indicating prior cardiac surgery. Cardiomediastinal silhouette appears unremarkable. Chronic loculated small left pleural effusion is similar to prior study. No focal consolidation. No pneumothorax. Osseous structures appear within normal limits for patient age. Impression: No acute cardiopulmonary disease. Workstation performed: [id number]     EKG, Pathology, and Other Studies: I have personally reviewed pertinent reports. VTE Prophylaxis: Sequential compression device Clemencia Allegra)     Code Status: Level 1 - Full Code  Advance Directive and Living Will:      Power of :    POLST:      Counseling / Coordination of Care  I spent 30 minutes with the patient.

## 2023-09-15 NOTE — H&P
[address] [facility]  H&P  Name: Edel Schuler 80 y.o. male I MRN: [id number]  Unit/Bed#: [facility] 421-39 I Date of Admission: 9/15/2023   Date of Service: 9/15/2023 I Hospital Day: 0      Assessment/Plan   Open fracture of temporal bone [facility])  Assessment & Plan  Temporal bone fracture noted on CT 9/15, with associated subdural hematoma, subarachnoid hemorrhage. Noted continued bleeding from right ear s/p administration of afrin  - Neuro exam: stable  - Continue neuro checks  - Appreciate neurosurgery and ENT recommendations  - Unasyn for treatment of open fracture  - Complete 7 day course of Keppra for seizure prophylaxis  - Chemical DVT prophylaxis: Not cleared for chemical prophylaxis by neurosurgery at this time. Continue SCDs bilaterally. - Hold all anticoagulants and anti platelet medications until cleared by neurosurgery to resume  - PT/OT and cognitive evaluation        Pneumocephalus  Assessment & Plan  See above    SAH (subarachnoid hemorrhage) ([address])  Assessment & Plan  See above    Fall  Assessment & Plan  - Status post fall with noted injuries. - Fall precautions. - Geriatric Medicine consultation for evaluation, medication review and recommendations.  - PT and OT evaluation and treatment as indicated. - Case Management consultation for disposition planning. SDH (subdural hematoma) ([facility])  Assessment & Plan  See above        Trauma Alert: Other Consult  Model of Arrival: Self  Trauma Team: Attending Olam Serve and Residents Rogerio  Consultants: ENT, neurosurgery    Trauma Active Problems: Temporal bone fracture, pneumocephalus, subdural hematoma, subarachnoid hemorrhage    Trauma Plan: Neurosurgery consult, ENT consult, [facility] for seizure prophylaxis, Unasyn to treat open temporal bone fracture    Chief Complaint: Headache    History of Present Illness   HPI:  Edel Schuler is a 80 y.o. male who presents after a fall.   Patient was moving something into his truck and fell backwards, hitting his head on the ground. Patient denies loss of consciousness or vomiting, but has been nauseous since the head strike. Denies any other injuries      Review of Systems   Constitutional: Negative. HENT: Negative. Eyes: Negative. Respiratory: Negative. Cardiovascular: Negative. Gastrointestinal: Positive for abdominal pain and nausea. Endocrine: Negative. Genitourinary: Negative. Musculoskeletal: Negative. Skin: Positive for wound. Neurological: Positive for headaches.        Past Medical History:   Past Medical History:   Diagnosis Date   • Acute kidney injury ([address])    • Anemia Jan. 2022   • Arthritis     Hands   • Wright esophagus    • BPH with obstruction/lower urinary tract symptoms    • CAD (coronary artery disease)     Last assessed 09/16/15   • Cancer ([address])     bladder   • Cataract, acquired     Last assessed 10/10/17   • Chronic kidney disease    • Coronary artery disease    • Diabetes mellitus ([address])     NIDDM   • Diabetic neuropathy (HCC)     Feet   • Enlarged prostate with lower urinary tract symptoms (LUTS)     Last assessed 10/10/17   • Erectile dysfunction    • GERD (gastroesophageal reflux disease)     Last assessed 10/10/17   • Hemoptysis     Last assessed 03/14/16   • Hypercholesterolemia     Last assessed 10/10/17   • Hypertension     Last assessed 10/10/17   • Kidney stone    • Macular degeneration     Right eye is particularly affected-peripheral vision intact   • Myocardial infarction [facility]) 1998   • OAB (overactive bladder)    • Testicular hypofunction    • Testicular hypogonadism     Last assessed 10/10/17   • Tinnitus    • Umbilical hernia     Last assessed 06/18/14   • Urge incontinence of urine    • Wears glasses        Past Surgical History:   Past Surgical History:   Procedure Laterality Date   • BLADDER SURGERY     • COLONOSCOPY     • CORONARY ARTERY BYPASS GRAFT  07/16/2014    ABG x 4 LIMA to LAD,SVG to diagnoal 2 SVG to OM-1, SVG to PDA, resection of partial plerual mass   • CT GUIDED PERC DRAINAGE CATHETER PLACEMENT  02/19/2021   • CYSTOSCOPY  2013   • CYSTOSCOPY  02/08/2022    Olya   • ESOPHAGOGASTRODUODENOSCOPY     • FL RETROGRADE PYELOGRAM  12/20/2018   • FL RETROGRADE PYELOGRAM  12/05/2019   • INGUINAL HERNIA REPAIR Bilateral 2015   • IR DRAINAGE TUBE CHECK AND/OR REMOVAL  03/05/2021   • PHOTODYNAMIC THERAPY      For Barretts esophagus   • DC COLONOSCOPY FLX DX W/COLLJ SPEC WHEN PFRMD N/A 04/25/2016    Procedure: COLONOSCOPY;  Surgeon: Marta Bolanos MD;  Location: [facility]; Service: Gastroenterology   • DC CYSTO W/REMOVAL OF LESIONS SMALL N/A 12/05/2019    Procedure: CYSTO W/TURBT AND TRANSURETHRAL PROSTATE BIOPSY AND OPENING OF BLADDER NECK CONTRACTURE, B/L Retrograde pyelogram;  Surgeon: Gay Cardenas MD;  Location: [facility];  Service: Urology   • DC CYSTOURETHROSCOPY W/DEST &/RMVL MED BLADDER TY N/A 04/16/2020    Procedure: CYSTOSCOPY, TRANSURETHRAL RESECTION OF BLADDER TUMOR (TURBT);   Surgeon: Gay Cardenas MD;  Location: [facility];  Service: Urology   • DC CYSTOURETHROSCOPY WITH BIOPSY N/A 09/10/2020    Procedure: CYSTOSCOPY, COLLECTION OF LEFT KIDNEY CYTOLOGY, BILATERAL RETROGRADE PYELOGRAM, BLADDER WALL BIOPSIES  AND FULGERAION, RANDOM BLADDER TUMOR BIOPSIES;  Surgeon: Gay Cardenas MD;  Location: [address] [place] [address] MAIN OR;  Service: Urology   • DC CYSTOURETHROSCOPY WITH BIOPSY N/A 04/05/2022    Procedure: urethroscopy , biopsy of urethra with fulgeration;  Surgeon: June Dumas MD;  Location:  MAIN OR;  Service: Urology   • PROSTATE SURGERY     • TONSILLECTOMY     • TRANSURETHRAL RESECTION OF PROSTATE N/A 12/20/2018    Procedure: CYSTO, PHOTO SELECTIVE VAPORIZATION OF PROSTATE, B/L RETROGRADE PYELOGRAM, TURBT;  Surgeon: Gay Cardenas MD;  Location: [facility];  Service: Urology   • UMBILICAL HERNIA REPAIR  2012   • UPPER GASTROINTESTINAL ENDOSCOPY         Social History:  Alcohol Use:   Social History Substance and Sexual Activity   Alcohol Use Not Currently   • Alcohol/week: 2.0 standard drinks of alcohol   • Types: 1 Glasses of wine, 1 Cans of beer per week    Comment: Non since 7/15/2020     Drug Use:   Social History     Substance and Sexual Activity   Drug Use Never     Tobacco Use:   Social History     Tobacco Use   Smoking Status Former   • Packs/day: 1.00   • Years: 10.00   • Total pack years: 10.00   • Types: Cigarettes   • Quit date: 1972   • Years since quittin.7   Smokeless Tobacco Never   Tobacco Comments    Quit at age 28       Immunizations:   Immunization History   Administered Date(s) Administered   • COVID-19 MODERNA VACC 0.5 ML IM 2021, 02/15/2021   • Influenza Split High Dose Preservative Free IM 10/10/2013, 2014, 10/11/2016   • Influenza, high dose seasonal 0.7 mL 2019, 10/08/2019, 2020, 2021, 2022   • Pneumococcal Conjugate 13-Valent 2019   • Pneumococcal Polysaccharide PPV23 2014   • Tdap 09/15/2023       Last Tetanus: given  Family History: non-contributory    Meds/Allergies   all current active meds have been reviewed     Allergies   Allergen Reactions   • Fentanyl Hallucinations   • Morphine And Related Other (See Comments)     While having an MI patient received morphine and had adverse reaction but doesn't know what happened. PHYSICAL EXAM    Objective   Vitals:   First set: Temperature: (!) 97.3 °F (36.3 °C) (09/15/23 1236)  Pulse: 63 (09/15/23 1236)  Respirations: (!) 24 (09/15/23 1236)  Blood Pressure: (!) 188/91 (09/15/23 1236)    Primary Survey:   (A) Airway: intact  (B) Breathing: CTA B/L  (C) Circulation: Pulses:   normal  (D) Disabliity:  GCS Total:  15  (E) Expose:  Completed    Secondary Survey: (Click on Physical Exam tab above)    Physical Exam  Constitutional:       General: He is not in acute distress. Appearance: He is not ill-appearing.    HENT:      Head:        Left Ear: Tympanic membrane normal. Ears:        Nose: Nose normal.      Mouth/Throat:      Mouth: Mucous membranes are moist.      Pharynx: Oropharynx is clear. Eyes:      Extraocular Movements: Extraocular movements intact. Pupils: Pupils are equal, round, and reactive to light. Cardiovascular:      Rate and Rhythm: Normal rate and regular rhythm. Pulses: Normal pulses. Heart sounds: Normal heart sounds. Pulmonary:      Effort: Pulmonary effort is normal.      Breath sounds: Normal breath sounds. Abdominal:      General: Abdomen is flat. Palpations: Abdomen is soft. Tenderness: There is abdominal tenderness in the left lower quadrant. Musculoskeletal:         General: No swelling. Normal range of motion. Arms:       Cervical back: Normal range of motion and neck supple. Skin:     General: Skin is warm and dry. Coloration: Skin is not jaundiced. Neurological:      General: No focal deficit present. Mental Status: He is oriented to person, place, and time. Mental status is at baseline. Invasive Devices     Peripheral Intravenous Line  Duration           Peripheral IV 09/15/23 Distal;Left;Ventral (anterior) Forearm <1 day          Drain  Duration           Urostomy -- days    Urostomy Ureterostomy right RLQ <1 day                Lab Results: Results: I have personally reviewed all pertinent laboratory/tests results  Imaging/EKG Studies: positive for acute findings: Subdural hematoma, subarachnoid hemorrhage, temporal bone fracture, pneumocephalus      Code Status: Level 1 - Full Code  Advance Directive and Living Will:      Power of :    POLST:      Counseling / Coordination of Care  Total floor / unit time spent today 30 minutes. This involved direct patient contact where I performed a full history and physical, reviewed previous records, and reviewed laboratory and other diagnostic studies.  Total Critical Care time spent 0 minutes excluding procedures, teaching and family updates.

## 2023-09-15 NOTE — CONSULTS
OTOLARYNGOLOGY CONSULT    Date of Service: 9/15/2023      ASSESSMENT/PLAN:  Merle Brady is a 80 y.o. male who we are consulted on for right otic sparing temporal bone fracture    -Facial nerve intact, no need for surgical decompression  -Hearing loss in right ear- tuning fork exam consistent with conductive pattern secondary to debris in canal and middle ear  -Recommend outpatient follow-up with audiogram next week or the following week after discharge  -Floxin drops twice daily 5 drops to R ear for 10 days due to debris in canal  -Please do not hesitate to reach out with any questions or concerns    Please contact ENT Resident [place] Pasha Role for any questions or concerns. HPI  80-year-old male who presented to the hospital after a fall. He was moving a box into a truck and fell backwards and hit his head on the asphalt. He did not endorse any loss of consciousness. He did develop some nausea and presented to the ED. He had a CT which revealed a temporal bone fracture and subdural hemorrhage. Today, upon my evaluation, he reports some subjective hearing loss on the right side, states that it is "muffled".      [id number] MEDICATIONS  Current Facility-Administered Medications   Medication Dose Route Frequency Provider Last Rate Last Admin   • acetaminophen (TYLENOL) tablet 650 mg  650 mg Oral Q6H PRN Zuleyka Guillen MD   650 mg at 09/15/23 1536   • atorvastatin (LIPITOR) tablet 40 mg  40 mg Oral HS Zuleyka Guillen MD       • citalopram (CeleXA) tablet 20 mg  20 mg Oral Daily Zuleyka Guillen MD   20 mg at 09/15/23 1536   • HYDROmorphone HCl (DILAUDID) injection 0.2 mg  0.2 mg Intravenous Q6H PRN Zuleyka Guillen MD       • insulin lispro (HumaLOG) 100 units/mL subcutaneous injection 1-6 Units  1-6 Units Subcutaneous TID [facility] Zuleyka Guillen MD       • levETIRAcetam (KEPPRA) 500 mg in sodium chloride 0.9 % 100 mL IVPB  500 mg Intravenous Q12H [facility] Reena Ayoub PA-C • ondansetron (ZOFRAN) injection 4 mg  4 mg Intravenous Q6H PRN Reena Gandhi PA-C       • oxyCODONE (ROXICODONE) IR tablet 5 mg  5 mg Oral Q8H PRN Judge Sacks, MD       • oxyCODONE (ROXICODONE) split tablet 2.5 mg  2.5 mg Oral Q8H PRN Judge Sacks, MD       • [START ON 9/16/2023] pantoprazole (PROTONIX) EC tablet 40 mg  40 mg Oral Early Morning Judge Sacks, MD       • torsemide [facility]) tablet 20 mg  20 mg Oral Every Other Day Judge Sacks, MD   20 mg at 09/15/23 1536       REVIEW OF SYSTEMS  As above    HISTORIES  PMH:  Past Medical History:   Diagnosis Date   • Acute kidney injury ([address])    • Anemia Jan. 2022   • Arthritis     Hands   • Wright esophagus    • BPH with obstruction/lower urinary tract symptoms    • CAD (coronary artery disease)     Last assessed 09/16/15   • Cancer ([address])     bladder   • Cataract, acquired     Last assessed 10/10/17   • Chronic kidney disease    • Coronary artery disease    • Diabetes mellitus (HCC)     NIDDM   • Diabetic neuropathy (HCC)     Feet   • Enlarged prostate with lower urinary tract symptoms (LUTS)     Last assessed 10/10/17   • Erectile dysfunction    • GERD (gastroesophageal reflux disease)     Last assessed 10/10/17   • Hemoptysis     Last assessed 03/14/16   • Hypercholesterolemia     Last assessed 10/10/17   • Hypertension     Last assessed 10/10/17   • Kidney stone    • Macular degeneration     Right eye is particularly affected-peripheral vision intact   • Myocardial infarction [facility]) 1998   • OAB (overactive bladder)    • Testicular hypofunction    • Testicular hypogonadism     Last assessed 10/10/17   • Tinnitus    • Umbilical hernia     Last assessed 06/18/14   • Urge incontinence of urine    • Wears glasses        PSH:  Past Surgical History:   Procedure Laterality Date   • BLADDER SURGERY     • COLONOSCOPY     • CORONARY ARTERY BYPASS GRAFT  07/16/2014    ABG x 4 LIMA to LAD,SVG to diagnoal 2 SVG to OM-1, SVG to PDA, resection of partial plerual mass   • CT GUIDED PERC DRAINAGE CATHETER PLACEMENT  02/19/2021   • CYSTOSCOPY  2013   • CYSTOSCOPY  02/08/2022    Vipinarkin   • ESOPHAGOGASTRODUODENOSCOPY     • FL RETROGRADE PYELOGRAM  12/20/2018   • FL RETROGRADE PYELOGRAM  12/05/2019   • INGUINAL HERNIA REPAIR Bilateral 2015   • IR DRAINAGE TUBE CHECK AND/OR REMOVAL  03/05/2021   • PHOTODYNAMIC THERAPY      For Barretts esophagus   • LA COLONOSCOPY FLX DX W/COLLJ SPEC WHEN PFRMD N/A 04/25/2016    Procedure: COLONOSCOPY;  Surgeon: Irais Galloway MD;  Location: [facility]; Service: Gastroenterology   • LA CYSTO W/REMOVAL OF LESIONS SMALL N/A 12/05/2019    Procedure: CYSTO W/TURBT AND TRANSURETHRAL PROSTATE BIOPSY AND OPENING OF BLADDER NECK CONTRACTURE, B/L Retrograde pyelogram;  Surgeon: Liza Mcgarry MD;  [facility];  Service: Urology   • LA CYSTOURETHROSCOPY W/DEST &/RMVL MED BLADDER TY N/A 04/16/2020    Procedure: CYSTOSCOPY, TRANSURETHRAL RESECTION OF BLADDER TUMOR (TURBT);   Surgeon: Liza Mcgarry MD;  [facility];  Service: Urology   • LA CYSTOURETHROSCOPY WITH BIOPSY N/A 09/10/2020    Procedure: CYSTOSCOPY, COLLECTION OF LEFT KIDNEY CYTOLOGY, BILATERAL RETROGRADE PYELOGRAM, BLADDER WALL BIOPSIES  AND FULGERAION, RANDOM BLADDER TUMOR BIOPSIES;  Surgeon: Liza Mcgarry MD;  Location: [facility] MAIN OR;  Service: Urology   • LA CYSTOURETHROSCOPY WITH BIOPSY N/A 04/05/2022    Procedure: urethroscopy , biopsy of urethra with fulgeration;  Surgeon: Puma Castellon MD;  Location:  MAIN OR;  Service: Urology   • PROSTATE SURGERY     • TONSILLECTOMY     • TRANSURETHRAL RESECTION OF PROSTATE N/A 12/20/2018    Procedure: CYSTO, PHOTO SELECTIVE VAPORIZATION OF PROSTATE, B/L RETROGRADE PYELOGRAM, TURBT;  Surgeon: Liza Mcgarry MD;  [facility];  Service: Urology   • UMBILICAL HERNIA REPAIR  2012   • UPPER GASTROINTESTINAL ENDOSCOPY         SocHx:  Social History     Tobacco Use   • Smoking status: Former     Packs/day: 1.00     Years: 10.00     Total pack years: 10.00     Types: Cigarettes     Quit date: 1972     Years since quittin.7   • Smokeless tobacco: Never   • Tobacco comments:     Quit at age 28   Vaping Use   • Vaping Use: Never used   Substance Use Topics   • Alcohol use: Not Currently     Alcohol/week: 2.0 standard drinks of alcohol     Types: 1 Glasses of wine, 1 Cans of beer per week     Comment: Non since 7/15/2020   • Drug use: Never       FH:  Family History   Problem Relation Age of Onset   • Cancer Mother         Topical oral abrasian caused cancer   • Other Mother         Digestive System Complications   • Diabetes Father    • Heart disease Father    • Hypertension Father    • Coronary artery disease Father    • Hyperlipidemia Father        ALLERGIES:  Allergies   Allergen Reactions   • Fentanyl Hallucinations   • Morphine And Related Other (See Comments)     While having an MI patient received morphine and had adverse reaction but doesn't know what happened. PHYSICAL EXAM  Visit Vitals  BP (!) 180/77   Pulse 63   Temp (!) 96.7 °F (35.9 °C)   Resp 19   SpO2 99%   Smoking Status Former       General: NAD  Ears:  External ears normal in appearance right ear canal with bloody debris, no active bleeding noted. Nayak lateralizes to the right, Rinne, R BC>AC  Nose:  External appearance normal, no septal deviation, no bleeding  Oral cavity:  No trismus, no mass/lesions  Neck: Trachea is midline  Skin:  No obvious facial lesions  Neuro: Motor and sensory grossly intact. Face symmetrical, no obvious cranial nerve palsies,motor and sensory grossly intact, no focal deficits.    Lungs:  Normal work of breathing, symmetrical chest expansion  Vascular: Well perfused    LABORATORY  Reviewed    PROCEDURES  none    RADIOLOGY    9/15/23 CT temporal bone- reviewed personally and with attending   IMPRESSION:     RIGHT TEMPORAL BONE CT  - Acute comminuted otic sparing right temporal bone fracture involving the squamosal, mastoid, and tympanic portions with diastases of occipitotemporal suture and extension of fracture into visualized right occipital calvarium. Recommend follow-up CTV head with contrast to assess underlying patency of right dural venous sinuses. - Probable small-to-moderate RIGHT-sided bloody mastoid effusion, large bloody middle ear effusion, and probable debris and blood products throughout external auditory canal.  - Small scalp hematoma in right occipito-temporal region.     LEFT TEMPORAL BONE CT  -Trace left mastoid effusion. Otherwise, normal LEFT temporal bone CT.     Patient Active Problem List    Diagnosis Date Noted   • Open fracture of temporal bone ([address]) 09/15/2023   • SDH (subdural hematoma) ([address]) 09/15/2023   • Fall 09/15/2023   • SAH (subarachnoid hemorrhage) ([address]) 09/15/2023   • Pneumocephalus 09/15/2023   • Hyponatremia 09/05/2023   • Pelvic lymphadenopathy 05/16/2023   • Chronic diastolic CHF (congestive heart failure) ([address]) 05/04/2023   • Rib pain on left side 04/11/2023   • Left inguinal pain 04/11/2023   • Chemotherapy-induced thrombocytopenia 02/15/2023   • Shortness of breath 12/14/2022   • Anemia due to stage 4 chronic kidney disease ([address]) 12/07/2022   • Stage 4 chronic kidney disease ([address]) 08/10/2022   • Secondary hyperparathyroidism of renal origin ([address]) 05/19/2022   • 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   • Functional diarrhea 03/25/2022   • Platelets decreased ([address]) 03/25/2022   • Visual hallucinations 11/23/2021   • Chronic kidney disease-mineral and bone disorder 10/24/2021   • Benign hypertension with chronic kidney disease, stage IV ([address]) 07/19/2021   • Persistent proteinuria 07/19/2021   • Anemia 07/19/2021   • RBBB 05/07/2021   • Hypomagnesemia 94/44/2001   • Metabolic acidosis 14/08/6571   • Severe protein-calorie malnutrition ([address]) 02/19/2021   • Right sided Pelvic abscess in male [facility]) 02/18/2021   • Ambulatory dysfunction 02/16/2021   • Frequent falls 02/16/2021 • Anxiety and depression 12/22/2020   • Overweight 12/22/2020   • Fungal dermatitis 12/03/2020   • Forearm mass, left 09/03/2020   • Elevated troponin 07/21/2020   • Liver function study, abnormal 06/25/2020   • Malignant neoplasm of urinary bladder neck ([address]) 03/24/2020   • Positive urinary cytology 11/05/2019   • Coronary artery disease involving native coronary artery of native heart without angina pectoris 09/09/2019   • Ischemic cardiomyopathy 09/09/2019   • History of bladder cancer 07/30/2019   • Closed fracture of upper end of right fibula 03/11/2019   • Malignant neoplasm of overlapping sites of bladder ([address]) 01/10/2019   • 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/2019   • Type 2 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 ([address]) 12/05/2018   • Bladder tumor 11/01/2018   • Overactive bladder 10/10/2018   • 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   • Backache 10/21/2013   • Arteriosclerotic cardiovascular disease 10/11/2012   • DMII (diabetes mellitus, type 2) ([address]) 10/11/2012   • Hyperlipidemia 10/11/2012       Nabil Marques MD  Otolaryngology - Head and Neck Surgery PGY-3  Please contact ENT Resident [place] Text Role for any questions or concerns.

## 2023-09-15 NOTE — ED PROVIDER NOTES
Final Diagnoses:     1. Pneumocephalus, traumatic    2. Traumatic injury of head, initial encounter    3. Temporal bone fracture (HCC)    4. Subdural hemorrhage (HCC)    5. Fall, initial encounter      ED Course as of 09/15/23 1541   Fri Sep 15, 2023   1314 Discussed with CT tech. CTH with reconds of temporal bone will be adequate for r/o fx.    1405 Still bleeding from the ear. Discussed with ENT; applied Afrin as instructed. Will re-evaluate. 1405 hs TnI 0hr(!): 58   1405 WBC(!): 15.04   1405 Hemoglobin(!): 10.3   1405 Platelet Count: 306     Nursing Triage:     Chief Complaint   Patient presents with   • Fall     Pt arrived via EMS. Pt fell backwards onto asphalt when lifting a box and struck the back of his head on the ground. Pt denies LOC and thinners. HPI:   This is a 80 y.o. male presenting for evaluation of fall. The patient states that he was moving something into his truck, he then somehow fell backwards while lifting a box and hit his head on the asphalt, no loss conscious, remembers everything. Feels very nauseous since the incident, denies vomiting prior to my arrival however while was examining him he started to vomit repeatedly. No change in hearing. No numbness or tingling down the arms or legs. No pain except in his head. Denies blood thinners specifically aspirin Plavix, Xarelto Eliquis. No history of similar. Last tetanus is unknown. ASSESSMENT + PLAN:   Given the head impact we will do CT head for bleeding, will do CT neck for any fractures. He does have bleeding from his right tympanic membrane versus blood that is present there, we will do a temporal bone CT. I do not like that he is vomiting repeatedly while was in the room, will do antiemetics, fluids, basic labs. I suspect I will be admitting the patient overnight for observation.   I used hydrogen peroxide and cleaned up the clotted blood from his hair, I cannot find where he has bleeding from.  Tetanus. Physical:   Pertinent: No C, T, L-spine tenderness. No ecchymosis on the back. No rib tenderness. Able to raise arms without any difficulty. No tenderness of the shoulders. Able to hip flex bilaterally without any difficulty. No belly tenderness. He has no mastoid tenderness, there is blood in the right ear canal, I cannot find where he is bleeding from. Abrasions on the occiput itself    General: VS reviewed  Appears in NAD  awake, alert. Well-nourished, well-developed. Appears stated age. Speaking normally in full sentences. Head: Normocephalic, atraumatic  Eyes: EOM-I. No diplopia. No hyphema. No subconjunctival hemorrhages. Symmetrical lids. ENT: Atraumatic external nose and ears. MMM  No malocclusion. No stridor. Normal phonation. No drooling. Normal swallowing. Neck: No JVD. CV: No pallor noted  Lungs:   No tachypnea  No respiratory distress  Abd: soft nt nd no rebound/guarding  MSK:   FROM spontaneously  Skin: Dry, intact. Neuro: Awake, alert, GCS15, CN II-XII grossly intact. Motor grossly intact. Psychiatric/Behavioral: interacting normally; appropriate mood/affect.  Exam: deferred    Vitals:    09/15/23 1236 09/15/23 1400 09/15/23 1522   BP: (!) 188/91 (!) 181/84 (!) 180/77   BP Location: Left arm Left arm    Pulse: 63 64 63   Resp: (!) 24 20 19   Temp: (!) 97.3 °F (36.3 °C)  (!) 96.7 °F (35.9 °C)   TempSrc: Oral     SpO2: 100% 100% 99%     - There are no obvious limitations to social determinants of care. - Nursing note reviewed. - Vitals reviewed. - Orders placed by myself and/or advanced practitioner / resident.    - Previous chart was reviewed  - No language barrier.   - History obtained from patient. - There are no limitations to the history obtained:     Critical Care Time:   - Critical care time: 33 minutes. - Critical care time was exclusive of seperately bilable procedures and treating other patients as well as teaching time.    - Critical care was necessary to treat or prevent imminent or life-threatening deterioration of the following condition: SDH  - Critical care time was spent personally by me on the following activities as well as the above as per the ED course and rest of chart: blood draw for specimens, obtaining history from patient / surrogate, developement of a treatment plan, discussions with consultants, evaluation of patient's response to the treatment, examination of the patient, ordering/performing treatements and interventions, re-evaluation of the patient's condition, review of old charts, ordering/reviewing laboratory studies and ordering/reviewing of radiographic studies    Past Medical:    has a past medical history of Acute kidney injury ([address]), Anemia (Jan. 2022), Arthritis, Wright esophagus, BPH with obstruction/lower urinary tract symptoms, CAD (coronary artery disease), Cancer ([address]), Cataract, acquired, Chronic kidney disease, Coronary artery disease, Diabetes mellitus ([address]), Diabetic neuropathy ([address]), Enlarged prostate with lower urinary tract symptoms (LUTS), Erectile dysfunction, GERD (gastroesophageal reflux disease), Hemoptysis, Hypercholesterolemia, Hypertension, Kidney stone, Macular degeneration, Myocardial infarction ([address]) (1998), OAB (overactive bladder), Testicular hypofunction, Testicular hypogonadism, Tinnitus, Umbilical hernia, Urge incontinence of urine, and Wears glasses. Past Surgical:    has a past surgical history that includes pr colonoscopy flx dx w/collj spec when pfrmd (N/A, 04/25/2016); Coronary artery bypass graft (07/16/2014); Colonoscopy; Inguinal hernia repair (Bilateral, 2015); Umbilical hernia repair (2012); Esophagogastroduodenoscopy; Tonsillectomy; Photodynamic Therapy; Transurethral resection of prostate (N/A, 12/20/2018);  FL retrograde pyelogram (12/20/2018); pr cysto w/removal of lesions small (N/A, 12/05/2019); FL retrograde pyelogram (12/05/2019); pr cystourethroscopy w/dest &/rmvl med bladder yudy (N/A, 2020); Upper gastrointestinal endoscopy; pr cystourethroscopy with biopsy (N/A, 09/10/2020); CT guided perc drainage catheter placeme (2021); IR drainage tube check and/or removal (2021); Cystoscopy (); Cystoscopy (2022); pr cystourethroscopy with biopsy (N/A, 2022); Bladder surgery; and Prostate surgery. Social:     Social History     Substance and Sexual Activity   Alcohol Use Not Currently   • Alcohol/week: 2.0 standard drinks of alcohol   • Types: 1 Glasses of wine, 1 Cans of beer per week    Comment: Non since 7/15/2020     Social History     Tobacco Use   Smoking Status Former   • Packs/day: 1.00   • Years: 10.00   • Total pack years: 10.00   • Types: Cigarettes   • Quit date: 1972   • Years since quittin.7   Smokeless Tobacco Never   Tobacco Comments    Quit at age 28     Social History     Substance and Sexual Activity   Drug Use Never       Echo:   Results for orders placed during the hospital encounter of 19    Echo complete with contrast if indicated    Narrative  [address] [facility]  [address] [facility], [place] operated by [facility], [address] [place] [address]  [phone]    Transthoracic Echocardiogram  2D, M-mode, Doppler, and Color Doppler    Study date:  2019    Patient: Stacie Escalona  MR number: [id number]  Account number: [de-identified]  : 1940  Age: 66 years  Gender: Male  Status: Outpatient  Location: [address] [place] [address] and [facility] [place]  Height: 68 in  Weight: 195 lb  BP: 130/ 80 mmHg    Indications: Coronary artery disease    Diagnoses: I25.10 - Atherosclerotic heart disease of native coronary artery without angina pectoris    Sonographer:  ITZEL Mendez  Primary Physician:  Latasha Lemos DO  Referring Physician:  Jules Seo MD  Group:  [facility]  Interpreting Physician:  Ashli Gross MD    SUMMARY    LEFT VENTRICLE:  Systolic function was mildly reduced.  Ejection fraction was estimated to be 45 %. There was akinesis of the basal-mid inferior wall(s). Wall thickness was mildly increased. LEFT ATRIUM:  The atrium was mildly dilated. MITRAL VALVE:  There was mild annular calcification. There was mild regurgitation. TRICUSPID VALVE:  There was trace regurgitation. PULMONIC VALVE:  There was trace regurgitation. HISTORY: PRIOR HISTORY: Hypertension, hyperlipidemia, coronary artery disease, coronary bypass, former smoker, type 2 diabetes, GERD, acute kidney injury    PROCEDURE: The study was performed in the [facility] [place]. This was a routine study. The transthoracic approach was used. The study included complete 2D imaging, M-mode, complete spectral Doppler, and color Doppler. Image  quality was adequate. LEFT VENTRICLE: Size was normal. Systolic function was mildly reduced. Ejection fraction was estimated to be 45 %. There was akinesis of the basal-mid inferior wall(s). Wall thickness was mildly increased. No evidence of apical thrombus. DOPPLER: Left ventricular diastolic function parameters were normal.    RIGHT VENTRICLE: The size was normal. Systolic function was normal. Wall thickness was normal.    LEFT ATRIUM: The atrium was mildly dilated. RIGHT ATRIUM: Size was normal.    MITRAL VALVE: There was mild annular calcification. Valve structure was normal. There was mild thickening. There was normal leaflet separation. DOPPLER: The transmitral velocity was within the normal range. There was no evidence for  stenosis. There was mild regurgitation. AORTIC VALVE: The valve was trileaflet. Leaflets exhibited normal thickness and normal cuspal separation. DOPPLER: Transaortic velocity was within the normal range. There was no evidence for stenosis. There was no significant  regurgitation. TRICUSPID VALVE: The valve structure was normal. There was normal leaflet separation. DOPPLER: The transtricuspid velocity was within the normal range. There was no evidence for stenosis. There was trace regurgitation. PULMONIC VALVE: Leaflets exhibited normal thickness, no calcification, and normal cuspal separation. DOPPLER: The transpulmonic velocity was within the normal range. There was trace regurgitation. PERICARDIUM: There was no pericardial effusion. The pericardium was normal in appearance. AORTA: The root exhibited normal size. SYSTEMIC VEINS: IVC: The inferior vena cava was normal in size. SYSTEM MEASUREMENT TABLES    2D  %FS: 31.88 %  Ao Diam: 3.59 cm  EDV(Teich): 94.97 ml  EF(Cube): 68.39 %  EF(Teich): 60.05 %  ESV(Cube): 29.81 ml  ESV(Teich): 37.93 ml  IVSd: 1.29 cm  LA Area: 21.97 cm2  LA Diam: 4.39 cm  LVEDV MOD A4C: 175.47 ml  LVEF MOD A4C: 58.01 %  LVESV MOD A4C: 73.68 ml  LVIDd: 4.55 cm  LVIDs: 3.1 cm  LVLd A4C: 8.62 cm  LVLs A4C: 7.8 cm  LVPWd: 1.18 cm  RA Area: 16.73 cm2  RV Diam.: 3.94 cm  SV MOD A4C: 101.79 ml  SV(Cube): 64.5 ml  SV(Teich): 57.03 ml    MM  TAPSE: 1.82 cm    PW  AVC: 378.66 ms  E': 0.06 m/s  E/E': 12.3  MV A Cain: 0.7 m/s  MV Dec Irion: 3.2 m/s2  MV DecT: 223.57 ms  MV E Cain: 0.71 m/s  MV E/A Ratio: 1.02    Intersoci[employer] Commission Accredited Echocardiography Laboratory    Prepared and electronically signed by    Zack Shah MD  Signed 02-Jul-2019 14:04:36    No results found for this or any previous visit. Cath:    No results found for this or any previous visit.       Code Status: Level 1 - Full Code  Advance Directive and Living Will:      Power of :    POLST:    Medications   levETIRAcetam (KEPPRA) tablet 500 mg (500 mg Oral Given 9/15/23 1536)   atorvastatin (LIPITOR) tablet 40 mg (has no administration in time range)   citalopram (CeleXA) tablet 20 mg (20 mg Oral Given 9/15/23 1536)   pantoprazole (PROTONIX) EC tablet 40 mg (has no administration in time range)   torsemide (DEMADEX) tablet 20 mg (20 mg Oral Given 9/15/23 1536)   insulin lispro (HumaLOG) 100 units/mL subcutaneous injection 1-6 Units (has no administration in time range)   acetaminophen (TYLENOL) tablet 650 mg (650 mg Oral Given 9/15/23 1536)   oxyCODONE (ROXICODONE) split tablet 2.5 mg (has no administration in time range)   oxyCODONE (ROXICODONE) IR tablet 5 mg (has no administration in time range)   HYDROmorphone HCl (DILAUDID) injection 0.2 mg (has no administration in time range)   ondansetron (FOR EMS ONLY) (ZOFRAN) 4 mg/2 mL injection 4 mg (0 mg Does not apply Given to EMS 9/15/23 1228)   ondansetron (ZOFRAN) 4 mg/2 mL injection **ADS Override Pull** (4 mg  Given 9/15/23 1249)   sodium chloride 0.9 % bolus 500 mL (0 mL Intravenous Stopped 9/15/23 1350)   oxymetazoline (AFRIN) 0.05 % nasal spray 2 spray (2 sprays Each Nare Given by Other 9/15/23 1351)   ampicillin-sulbactam (UNASYN) 3 g in sodium chloride 0.9 % 100 mL IVPB (3 g Intravenous New Bag 9/15/23 1508)   tetanus-diphtheria-acellular pertussis (BOOSTRIX) IM injection 0.5 mL (0.5 mL Intramuscular Given 9/15/23 1507)   iohexol (OMNIPAQUE) 350 MG/ML injection (SINGLE-DOSE) 100 mL (100 mL Intravenous Given 9/15/23 1518)     XR chest 1 view portable   Final Result      No acute cardiopulmonary disease. Workstation performed: [id number]         CT head without contrast   Final Result   Multicompartment hemorrhage with small foci of subarachnoid hemorrhage in the bilateral inferior frontal region. Small foci of subdural hemorrhage in the right insular region and temporal region without significant mass effect   Additional small foci of subarachnoid hemorrhage versus small contusions in the right temporal region      Air noted within the right sigmoid sinus, transverse sinus with associated fracture of the right temporal bone with small foci of extra-axial air in the right middle cranial fossa and hemorrhage in the right middle ear.    Nondisplaced fracture through the right occipital bone         I personally discussed this study with Evi Estevez on 9/15/2023 3:01 PM.                  Workstation performed: [id number]         CT cervical spine without contrast   Final Result      No acute compression collapse of the vetebra   Fracture of the right temporal bone, incompletely assessed      Incidental 1.8 cm right thyroid nodule, meets the size/criteria for further assessment with ultrasound for an incidentally detected nodule               Workstation performed: [id number]         CT recon (no charge)   ED Interpretation   Abnormal   Awaiting formal read from radiologist    On my interpretation:  Subdural, RIGHT  Skull fx, RIGHT  Maxillary blood  Pneumocephalus. Consulted ENT  Consulted Trauma. Final Result      RIGHT TEMPORAL BONE CT   - Acute comminuted otic sparing right temporal bone fracture involving the squamosal, mastoid, and tympanic portions with diastases of occipitotemporal suture and extension of fracture into visualized right occipital calvarium. Recommend follow-up CTV    head with contrast to assess underlying patency of right dural venous sinuses. - Probable small-to-moderate RIGHT-sided bloody mastoid effusion, large bloody middle ear effusion, and probable debris and blood products throughout external auditory canal.   - Small scalp hematoma in right occipito-temporal region. LEFT TEMPORAL BONE CT   -Trace left mastoid effusion. Otherwise, normal LEFT temporal bone CT. Please see same day CT head without contrast for further evaluation especially given right-sided subarachnoid hemorrhage and small volume pneumocephalus. The study was marked in [facility] for immediate notification.                   Workstation performed: [id number]         CT spine thoracic wo contrast    (Results Pending)   CT spine lumbar wo contrast    (Results Pending)   CT chest abdomen pelvis w contrast    (Results Pending)     Orders Placed This Encounter   Procedures   • CT head without contrast   • CT cervical spine without contrast   • XR chest 1 view portable   • CT recon (no charge)   • CT spine thoracic wo contrast   • CT spine lumbar wo contrast   • CT chest abdomen pelvis w contrast   • CBC and differential   • Comprehensive metabolic panel   • HS Troponin 0hr (reflex protocol)   • HS Troponin I 2hr   • HS Troponin I 4hr   • Insert peripheral IV   • Nursing Communication Afrin to bedside   • Vital Signs - Med Surg   • Notify physician   • Nursing Communication Notify provider/AP of positive CAM   • Up in chair   • Incentive spirometry   • Encourage deep breathing and coughing   • HOT protocol   • Neuro checks   • Vital Signs   • Apply SCD or Foot pumps   • Insulin Subcutaneous Notify Physician   • Insulin Subcutaneous Instruction   • Hypoglycemia Protocol   • Fingerstick Glucose (POCT)   • Level 1-Full Code: all life saving measures are indicated   • Inpatient consult to ENT   • Trauma evaluation   • Inpatient consult to Neurosurgery   • Inpatient consult to ENT   • Inpatient consult to Gerontology   • ECG 12 lead   • Type and Screen   • Inpatient Admission   • Seizure precautions     Labs Reviewed   CBC AND DIFFERENTIAL - Abnormal       Result Value Ref Range Status    WBC 15.04 (*) 4.31 - 10.16 Thousand/uL Final    RBC 3.46 (*) 3.88 - 5.62 Million/uL Final    Hemoglobin 10.3 (*) 12.0 - 17.0 g/dL Final    Hematocrit 31.4 (*) 36.5 - 49.3 % Final    MCV 91  82 - 98 fL Final    MCH 29.8  26.8 - 34.3 pg Final    MCHC 32.8  31.4 - 37.4 g/dL Final    RDW 16.7 (*) 11.6 - 15.1 % Final    MPV 11.4  8.9 - 12.7 fL Final    Platelets 031  704 - 390 Thousands/uL Final    nRBC 0  /100 WBCs Final    Neutrophils Relative 83 (*) 43 - 75 % Final    Immat GRANS % 1  0 - 2 % Final    Lymphocytes Relative 7 (*) 14 - 44 % Final    Monocytes Relative 8  4 - 12 % Final    Eosinophils Relative 1  0 - 6 % Final    Basophils Relative 0  0 - 1 % Final    Neutrophils Absolute 12.54 (*) 1.85 - 7.62 Thousands/µL Final    Immature Grans Absolute 0.11  0.00 - 0.20 Thousand/uL Final    Lymphocytes Absolute 1.03  0.60 - 4.47 Thousands/µL Final    Monocytes Absolute 1.23 (*) 0.17 - 1.22 Thousand/µL Final    Eosinophils Absolute 0.09  0.00 - 0.61 Thousand/µL Final    Basophils Absolute 0.04  0.00 - 0.10 Thousands/µL Final   COMPREHENSIVE METABOLIC PANEL - Abnormal    Sodium 134 (*) 135 - 147 mmol/L Final    Potassium 3.8  3.5 - 5.3 mmol/L Final    Chloride 103  96 - 108 mmol/L Final    CO2 16 (*) 21 - 32 mmol/L Final    ANION GAP 15  mmol/L Final    BUN 57 (*) 5 - 25 mg/dL Final    Creatinine 2.28 (*) 0.60 - 1.30 mg/dL Final    Comment: Standardized to IDMS reference method    Glucose 196 (*) 65 - 140 mg/dL Final    Comment: If the patient is fasting, the ADA then defines impaired fasting glucose as > 100 mg/dL and diabetes as > or equal to 123 mg/dL. Calcium 8.5  8.4 - 10.2 mg/dL Final    Corrected Calcium 9.0  8.3 - 10.1 mg/dL Final    AST 17  13 - 39 U/L Final    ALT 15  7 - 52 U/L Final    Comment: Specimen collection should occur prior to Sulfasalazine administration due to the potential for falsely depressed results. Alkaline Phosphatase 97  34 - 104 U/L Final    Total Protein 6.7  6.4 - 8.4 g/dL Final    Albumin 3.4 (*) 3.5 - 5.0 g/dL Final    Total Bilirubin 0.66  0.20 - 1.00 mg/dL Final    Comment: Use of this assay is not recommended for patients undergoing treatment with eltrombopag due to the potential for falsely elevated results. N-acetyl-p-benzoquinone imine (metabolite of Acetaminophen) will generate erroneously low results in samples for patients that have taken an overdose of Acetaminophen.     eGFR 25  ml/min/1.73sq m Final    Narrative:     [place] guidelines for Chronic Kidney Disease (CKD):   •  Stage 1 with normal or high GFR (GFR > 90 mL/min/1.73 square meters)  •  Stage 2 Mild CKD (GFR = 60-89 mL/min/1.73 square meters)  •  Stage 3A Moderate CKD (GFR = 45-59 mL/min/1.73 square meters)  •  Stage 3B Moderate CKD (GFR = 30-44 mL/min/1.73 square meters)  •  Stage 4 Severe CKD (GFR = 15-29 mL/min/1.73 square meters)  •  Stage 5 End Stage CKD (GFR <15 mL/min/1.73 square meters)  Note: GFR calculation is accurate only with a steady state creatinine   HS TROPONIN I 0HR - Abnormal    hs TnI 0hr 58 (*) "Refer to ACS Flowchart"- see link ng/L Final    Comment:                                              Initial (time 0) result  If >=50 ng/L, Myocardial injury suggested ;  Type of myocardial injury and treatment strategy  to be determined. If 5-49 ng/L, a delta result at 2 hours and or 4 hours will be needed to further evaluate. If <4 ng/L, and chest pain has been >3 hours since onset, patient may qualify for discharge based on the HEART score in the ED. If <5 ng/L and <3hours since onset of chest pain, a delta result at 2 hours will be needed to further evaluate. HS Troponin 99th Percentile URL of a Health Population=12 ng/L with a 95% Confidence Interval of 8-18 ng/L. Second Troponin (time 2 hours)  If calculated delta >= 20 ng/L,  Myocardial injury suggested ; Type of myocardial injury and treatment strategy to be determined. If 5-49 ng/L and the calculated delta is 5-19 ng/L, consult medical service for evaluation. Continue evaluation for ischemia on ecg and other possible etiology and repeat hs troponin at 4 hours. If delta is <5 ng/L at 2 hours, consider discharge based on risk stratification via the HEART score (if in ED), or YARIEL risk score in IP/Observation.     HS Troponin 99th Percentile URL of a Health Population=12 ng/L with a 95% Confidence Interval of 8-18 ng/L.   HS TROPONIN I 2HR   HS TROPONIN I 4HR   TYPE AND SCREEN     Time reflects when diagnosis was documented in both MDM as applicable and the Disposition within this note     Time User Action Codes Description Comment    9/15/2023  2:24 PM Renu Ruiz Add [G93.89] Pneumocephalus, traumatic     9/15/2023  2:24 PM Renu Ruiz Add [S09.90XA] Traumatic injury of head, initial encounter     9/15/2023  2:24 PM Billy Kelly Add [S02.19XA] Temporal bone fracture ([address])     9/15/2023  2:24 PM Joceline Leos Add [I62.00] Subdural hemorrhage ([address])     9/15/2023  3:11 PM Trinity Ortiz Add [O44. Alejo Lainez, initial encounter       ED Disposition     ED Disposition   Admit    Condition   Stable    Date/Time   Fri Sep 15, 2023  2:24 PM    Comment   Case was discussed withReis and the patient's admission status was agreed to be Admission Status: inpatient status to the service of Dr. Terry Santamaria .            Follow-up Information    None       Current Discharge Medication List      CONTINUE these medications which have NOT CHANGED    Details   acetaminophen (TYLENOL) 325 mg tablet Take 650 mg by mouth every 6 (six) hours as needed for mild pain      atorvastatin (LIPITOR) 40 mg tablet TAKE ONE TABLET BY MOUTH AT BEDTIME  Qty: 90 tablet, Refills: 3    Associated Diagnoses: Coronary artery disease involving native coronary artery of native heart without angina pectoris      Blood Glucose Monitoring Suppl (OneTouch Verio Flex System) w/Device KIT Use to check blood sugars daily  Qty: 1 kit, Refills: 0    Associated Diagnoses: Type 2 diabetes mellitus with other circulatory complication, without long-term current use of insulin ([facility])      calcitriol (ROCALTROL) 0.25 mcg capsule Take 1 tablet 2 times a week (Monday, Friday)  Qty: 24 capsule, Refills: 3    Associated Diagnoses: Secondary hyperparathyroidism of renal origin ([address])      Cholecalciferol (VITAMIN D) 50 MCG (2000 UT) tablet Take 2,000 Units by mouth daily      citalopram (CeleXA) 20 mg tablet TAKE ONE TABLET BY MOUTH EVERY DAY  Qty: 30 tablet, Refills: 3    Associated Diagnoses: Anxiety and depression      Farxiga 5 MG TABS TAKE ONE TABLET BY MOUTH EVERY DAY  Qty: 90 tablet, Refills: 2    Associated Diagnoses: Type 2 diabetes mellitus with both eyes affected by mild nonproliferative retinopathy without macular edema, without long-term current use of insulin ([facility])      ferrous sulfate 324 (65 Fe) mg Take 1 tablet (324 mg total) by mouth daily  Qty: 30 tablet, Refills: 5    Associated Diagnoses: Anemia due to stage 4 chronic kidney disease ([facility])      Lancets (OneTouch Delica Plus [id number]) MISC TEST BLOOD GLUCOSE ONCE DAILY  Qty: 100 each, Refills: 0    Associated Diagnoses: Type 2 diabetes mellitus with other circulatory complication, without long-term current use of insulin ([facility])      losartan (COZAAR) 25 mg tablet Take 25 mg by mouth daily      magnesium oxide (MAG-OX) 400 mg Take 1 tablet (400 mg total) by mouth in the morning.   Qty: 180 tablet, Refills: 2    Associated Diagnoses: Hypermagnesemia      metoprolol tartrate (LOPRESSOR) 25 mg tablet Take 1 tablet (25 mg total) by mouth every 12 (twelve) hours  Qty: 180 tablet, Refills: 3    Associated Diagnoses: Cardiomyopathy, unspecified type (Formerly [facility] [place])      Multiple Vitamins-Minerals (OCUVITE-LUTEIN PO) Take 1 tablet by mouth 2 (two) times a day Pt is taking preservision       Omega-3 Fatty Acids (Fish Oil) 1200 MG CPDR Take by mouth in the morning      omeprazole (PriLOSEC) 40 MG capsule TAKE ONE CAPSULE BY MOUTH EVERY MORNING  Qty: 90 capsule, Refills: 3    Associated Diagnoses: Wright's esophagus with esophagitis      ondansetron (ZOFRAN) 4 mg tablet Take 1 tablet (4 mg total) by mouth every 8 (eight) hours as needed for nausea or vomiting  Qty: 20 tablet, Refills: 1    Associated Diagnoses: Chemotherapy-induced nausea      OneTouch Verio test strip TEST ONCE A DAY  Qty: 100 strip, Refills: 0    Associated Diagnoses: Type 2 diabetes mellitus with other circulatory complication, without long-term current use of insulin (Formerly [facility] [place])      sodium bicarbonate 650 mg tablet Take 2 tablets (1,300 mg total) by mouth 2 (two) times a day  Qty: 360 tablet, Refills: 3    Associated Diagnoses: Metabolic acidosis      torsemide (DEMADEX) 20 mg tablet Take 1 tablet (20 mg total) by mouth every other day  Qty: 90 tablet, Refills: 0    Associated Diagnoses: Bilateral lower extremity edema      traMADol (ULTRAM) 50 mg tablet TAKE ONE TABLET BY MOUTH EVERY 6 HOURS AS NEEDED FOR MODERATE PAIN  Qty: 20 tablet, Refills: 0    Associated Diagnoses: Malignant neoplasm of overlapping sites of bladder ([address])           No discharge procedures on file. Prior to Admission Medications   Prescriptions Last Dose Informant Patient Reported? Taking?    Blood Glucose Monitoring Suppl (OneTouch Verio Flex System) w/Device KIT  Self No No   Sig: Use to check blood sugars daily   Cholecalciferol (VITAMIN D) 50 MCG (2000 UT) tablet  Self Yes No   Sig: Take 2,000 Units by mouth daily   Farxiga 5 MG TABS  Self No No   Sig: TAKE ONE TABLET BY MOUTH EVERY DAY   Lancets (OneTouch Delica Plus [id number]) MISC  Self No No   Sig: TEST BLOOD GLUCOSE ONCE DAILY   Multiple Vitamins-Minerals (OCUVITE-LUTEIN PO)  Self Yes No   Sig: Take 1 tablet by mouth 2 (two) times a day Pt is taking preservision    Omega-3 Fatty Acids (Fish Oil) 1200 MG CPDR  Self Yes No   Sig: Take by mouth in the morning   Patient not taking: Reported on 8/8/2023   OneTouch Verio test strip  Self No No   Sig: TEST ONCE A DAY   acetaminophen (TYLENOL) 325 mg tablet  Self Yes No   Sig: Take 650 mg by mouth every 6 (six) hours as needed for mild pain   atorvastatin (LIPITOR) 40 mg tablet  Self No No   Sig: TAKE ONE TABLET BY MOUTH AT BEDTIME   calcitriol (ROCALTROL) 0.25 mcg capsule  Self No No   Sig: Take 1 tablet 2 times a week (Monday, Friday)   Patient taking differently: Take 1 tablet 2 times a week (Tuesday, Friday)   citalopram (CeleXA) 20 mg tablet  Self No No   Sig: TAKE ONE TABLET BY MOUTH EVERY DAY   ferrous sulfate 324 (65 Fe) mg  Self No No   Sig: Take 1 tablet (324 mg total) by mouth daily   losartan (COZAAR) 25 mg tablet  Self Yes No   Sig: Take 25 mg by mouth daily   magnesium oxide (MAG-OX) 400 mg  Self No No   Sig: Take 1 tablet (400 mg total) by mouth in the morning. metoprolol tartrate (LOPRESSOR) 25 mg tablet  Self No No   Sig: Take 1 tablet (25 mg total) by mouth every 12 (twelve) hours   omeprazole (PriLOSEC) 40 MG capsule  Self No No   Sig: TAKE ONE CAPSULE BY MOUTH EVERY MORNING   ondansetron (ZOFRAN) 4 mg tablet  Self No No   Sig: Take 1 tablet (4 mg total) by mouth every 8 (eight) hours as needed for nausea or vomiting   Patient not taking: Reported on 9/12/2023   sodium bicarbonate 650 mg tablet  Self No No   Sig: Take 2 tablets (1,300 mg total) by mouth 2 (two) times a day   torsemide (DEMADEX) 20 mg tablet  Self No No   Sig: Take 1 tablet (20 mg total) by mouth every other day   traMADol (ULTRAM) 50 mg tablet  Self No No   Sig: TAKE ONE TABLET BY MOUTH EVERY 6 HOURS AS NEEDED FOR MODERATE PAIN      Facility-Administered Medications: None                        Portions of the record may have been created with voice recognition software. Occasional wrong word or "sound a like" substitutions may have occurred due to the inherent limitations of voice recognition software. Read the chart carefully and recognize, using context, where substitutions have occurred.     Electronically signed by:  Deena Salcedo MD  09/15/23 0053

## 2023-09-15 NOTE — ASSESSMENT & PLAN NOTE
Acute multi-compartment hemorrhage, SAH and SDH   R temporal bone fracture   Small pneumocephalus   - presents to [facility] ER after a fall backwards with head strike to asphalt   - no LOC   - no AC/AP meds   - current exam: GCS 15, neuro intact. Trace bloody drainage from R ear. Imaging:   · 9/15 CT temporal bones: RIGHT TEMPORAL BONE CT: Acute comminuted otic sparing right temporal bone fracture involving the squamosal, mastoid, and tympanic portions with diastases of occipitotemporal suture and extension of fracture into visualized right occipital calvarium. Recommend follow-up CTV head with contrast to assess underlying patency of right dural venous sinuses. Probable small-to-moderate RIGHT-sided bloody mastoid effusion, large bloody middle ear effusion, and probable debris and blood products throughout external auditory canal. Small scalp hematoma in right occipito-temporal region. LEFT TEMPORAL BONE CT: Trace left mastoid effusion. Otherwise, normal LEFT temporal bone CT.  · 9/15 CT c-spine: No acute compression collapse of the vetebra. Fracture of the right temporal bone, incompletely assessed  · 9/15 CTH: Multicompartment hemorrhage with small foci of subarachnoid hemorrhage in the bilateral inferior frontal region. Small foci of subdural hemorrhage in the right insular region and temporal region without significant mass effect. Additional small foci of subarachnoid hemorrhage versus small contusions in the right temporal region. Air noted within the right sigmoid sinus, transverse sinus with associated fracture of the right temporal bone with small foci of extra-axial air in the right middle cranial fossa and hemorrhage in the right middle ear.  Nondisplaced fracture through the right occipital bone    Plan:   · Continue to closely monitor neuro exam   · Frequent neuro checks per primary team   · repeat STAT CTH with any acute decline in GCS > 2pts or more in 1 hr   · Maintain normotensive BP goals, SBP < 160 · No acute neurosurgical intervention indicated at this time   · Case and imaging reviewed   · Pt remains GCS 15, neuro intact   · Recommend close neuro-monitoring and close follow-up with repeat CTH   · Plan for repeat CTH in the am   · Agree with plan for CTA/CTV for further evaluation   · Continue keppra per trauma team protocol   · Hold all AC/AP meds --> not taking at home   · Please obtain baseline coags, PT/INR and PTT to ensure no coagulopathy   · DVT ppx: SCDs, hold chem ppx until cleared by nsgy team   · ENT consulted for temporal bone fracture, appreciate recs   · Continue medical management per primary team   · Pain control per primary team   · PT/OT   · Social work following for assistance with dispo once medically cleared     Neurosurgery will continue to follow. Please reach out with any further questions or concerns.

## 2023-09-15 NOTE — ASSESSMENT & PLAN NOTE
- Status post fall with noted injuries. - Fall precautions. - Geriatric Medicine consultation for evaluation, medication review and recommendations.  - PT and OT evaluation and treatment as indicated. - Case Management consultation for disposition planning.

## 2023-09-15 NOTE — PLAN OF CARE
Problem: MOBILITY - ADULT  Goal: Maintain or return to baseline ADL function  Description: INTERVENTIONS:  -  Assess patient's ability to carry out ADLs; assess patient's baseline for ADL function and identify physical deficits which impact ability to perform ADLs (bathing, care of mouth/teeth, toileting, grooming, dressing, etc.)  - Assess/evaluate cause of self-care deficits   - Assess range of motion  - Assess patient's mobility; develop plan if impaired  - Assess patient's need for assistive devices and provide as appropriate  - Encourage maximum independence but intervene and supervise when necessary  - Involve family in performance of ADLs  - Assess for home care needs following discharge   - Consider OT consult to assist with ADL evaluation and planning for discharge  - Provide patient education as appropriate  Outcome: Progressing  Goal: Maintains/Returns to pre admission functional level  Description: INTERVENTIONS:  - Perform BMAT or MOVE assessment daily.   - Set and communicate daily mobility goal to care team and patient/family/caregiver.    - Collaborate with rehabilitation services on mobility goals if consulted  - Record patient progress and toleration of activity level   Outcome: Progressing     Problem: PAIN - ADULT  Goal: Verbalizes/displays adequate comfort level or baseline comfort level  Description: Interventions:  - Encourage patient to monitor pain and request assistance  - Assess pain using appropriate pain scale  - Administer analgesics based on type and severity of pain and evaluate response  - Implement non-pharmacological measures as appropriate and evaluate response  - Consider cultural and social influences on pain and pain management  - Notify physician/advanced practitioner if interventions unsuccessful or patient reports new pain  Outcome: Progressing     Problem: INFECTION - ADULT  Goal: Absence or prevention of progression during hospitalization  Description: INTERVENTIONS:  - Assess and monitor for signs and symptoms of infection  - Monitor lab/diagnostic results  - Monitor all insertion sites, i.e. indwelling lines, tubes, and drains  - Monitor endotracheal if appropriate and nasal secretions for changes in amount and color  - Hardin appropriate cooling/warming therapies per order  - Administer medications as ordered  - Instruct and encourage patient and family to use good hand hygiene technique  - Identify and instruct in appropriate isolation precautions for identified infection/condition  Outcome: Progressing     Problem: SAFETY ADULT  Goal: Maintain or return to baseline ADL function  Description: INTERVENTIONS:  -  Assess patient's ability to carry out ADLs; assess patient's baseline for ADL function and identify physical deficits which impact ability to perform ADLs (bathing, care of mouth/teeth, toileting, grooming, dressing, etc.)  - Assess/evaluate cause of self-care deficits   - Assess range of motion  - Assess patient's mobility; develop plan if impaired  - Assess patient's need for assistive devices and provide as appropriate  - Encourage maximum independence but intervene and supervise when necessary  - Involve family in performance of ADLs  - Assess for home care needs following discharge   - Consider OT consult to assist with ADL evaluation and planning for discharge  - Provide patient education as appropriate  Outcome: Progressing  Goal: Maintains/Returns to pre admission functional level  Description: INTERVENTIONS:  - Perform BMAT or MOVE assessment daily.   - Set and communicate daily mobility goal to care team and patient/family/caregiver.    - Collaborate with rehabilitation services on mobility goals if consulted  - Record patient progress and toleration of activity level   Outcome: Progressing  Goal: Patient will remain free of falls  Description: INTERVENTIONS:  - Educate patient/family on patient safety including physical limitations  - Instruct patient to call for assistance with activity   - Consult OT/PT to assist with strengthening/mobility   - Keep Call bell within reach  - Keep bed low and locked with side rails adjusted as appropriate  - Keep care items and personal belongings within reach  - Initiate and maintain comfort rounds  - Make Fall Risk Sign visible to staff  - Apply yellow socks and bracelet for high fall risk patients  - Consider moving patient to room near nurses station  Outcome: Progressing     Problem: DISCHARGE PLANNING  Goal: Discharge to home or other facility with appropriate resources  Description: INTERVENTIONS:  - Identify barriers to discharge w/patient and caregiver  - Arrange for needed discharge resources and transportation as appropriate  - Identify discharge learning needs (meds, wound care, etc.)  - Arrange for interpretive services to assist at discharge as needed  - Refer to Case Management Department for coordinating discharge planning if the patient needs post-hospital services based on physician/advanced practitioner order or complex needs related to functional status, cognitive ability, or social support system  Outcome: Progressing     Problem: Knowledge Deficit  Goal: Patient/family/caregiver demonstrates understanding of disease process, treatment plan, medications, and discharge instructions  Description: Complete learning assessment and assess knowledge base.   Interventions:  - Provide teaching at level of understanding  - Provide teaching via preferred learning methods  Outcome: Progressing

## 2023-09-16 ENCOUNTER — APPOINTMENT (INPATIENT)
Dept: RADIOLOGY | Facility: HOSPITAL | Age: 83
DRG: 070 | End: 2023-09-16
Payer: MEDICARE

## 2023-09-16 ENCOUNTER — APPOINTMENT (INPATIENT)
Dept: NON INVASIVE DIAGNOSTICS | Facility: HOSPITAL | Age: 83
DRG: 070 | End: 2023-09-16
Payer: MEDICARE

## 2023-09-16 LAB
2HR DELTA HS TROPONIN: 26 NG/L
4HR DELTA HS TROPONIN: 26 NG/L
AORTIC ROOT: 3.4 CM
AORTIC VALVE MEAN VELOCITY: 10 M/S
APICAL FOUR CHAMBER EJECTION FRACTION: 54 %
ASCENDING AORTA: 3.2 CM
ATRIAL RATE: 258 BPM
ATRIAL RATE: 258 BPM
ATRIAL RATE: 260 BPM
ATRIAL RATE: 264 BPM
ATRIAL RATE: 71 BPM
AV AREA BY CONTINUOUS VTI: 1.7 CM2
AV AREA PEAK VELOCITY: 1.8 CM2
AV LVOT MEAN GRADIENT: 2 MMHG
AV LVOT PEAK GRADIENT: 4 MMHG
AV MEAN GRADIENT: 4 MMHG
AV PEAK GRADIENT: 7 MMHG
AV VALVE AREA: 1.69 CM2
AV VELOCITY RATIO: 0.71
CARDIAC TROPONIN I PNL SERPL HS: 458 NG/L
CARDIAC TROPONIN I PNL SERPL HS: 458 NG/L
DOP CALC AO PEAK VEL: 1.33 M/S
DOP CALC AO VTI: 28.08 CM
DOP CALC LVOT AREA: 2.54 CM2
DOP CALC LVOT CARDIAC INDEX: 1.62 L/MIN/M2
DOP CALC LVOT CARDIAC OUTPUT: 3.15 L/MIN
DOP CALC LVOT DIAMETER: 1.8 CM
DOP CALC LVOT PEAK VEL VTI: 18.69 CM
DOP CALC LVOT PEAK VEL: 0.95 M/S
DOP CALC LVOT STROKE INDEX: 23.1 ML/M2
DOP CALC LVOT STROKE VOLUME: 47.54 CM3
E WAVE DECELERATION TIME: 193 MS
FRACTIONAL SHORTENING: 22 % (ref 28–44)
GLUCOSE SERPL-MCNC: 129 MG/DL (ref 65–140)
GLUCOSE SERPL-MCNC: 152 MG/DL (ref 65–140)
GLUCOSE SERPL-MCNC: 153 MG/DL (ref 65–140)
INTERVENTRICULAR SEPTUM IN DIASTOLE (PARASTERNAL SHORT AXIS VIEW): 1.1 CM
INTERVENTRICULAR SEPTUM: 1.1 CM (ref 0.6–1.1)
LAAS-AP2: 27.2 CM2
LAAS-AP4: 24.9 CM2
LEFT ATRIUM SIZE: 5.1 CM
LEFT ATRIUM VOLUME (MOD BIPLANE): 85 ML
LEFT INTERNAL DIMENSION IN SYSTOLE: 3.6 CM (ref 2.1–4)
LEFT VENTRICLE DIASTOLIC VOLUME (MOD BIPLANE): 100 ML
LEFT VENTRICLE SYSTOLIC VOLUME (MOD BIPLANE): 48 ML
LEFT VENTRICULAR INTERNAL DIMENSION IN DIASTOLE: 4.6 CM (ref 3.5–6)
LEFT VENTRICULAR POSTERIOR WALL IN END DIASTOLE: 1.1 CM
LEFT VENTRICULAR STROKE VOLUME: 42 ML
LV EF: 52 %
LVSV (TEICH): 42 ML
MV E'TISSUE VEL-SEP: 6 CM/S
MV PEAK A VEL: 0.44 M/S
MV PEAK E VEL: 124 CM/S
MV STENOSIS PRESSURE HALF TIME: 56 MS
MV VALVE AREA P 1/2 METHOD: 3.93 CM2
P AXIS: 213 DEGREES
P AXIS: 250 DEGREES
P AXIS: 88 DEGREES
QRS AXIS: -52 DEGREES
QRS AXIS: -56 DEGREES
QRS AXIS: -57 DEGREES
QRS AXIS: -59 DEGREES
QRS AXIS: -61 DEGREES
QRSD INTERVAL: 126 MS
QRSD INTERVAL: 130 MS
QRSD INTERVAL: 130 MS
QT INTERVAL: 454 MS
QT INTERVAL: 460 MS
QT INTERVAL: 504 MS
QT INTERVAL: 526 MS
QT INTERVAL: 646 MS
QTC INTERVAL: 468 MS
QTC INTERVAL: 470 MS
QTC INTERVAL: 524 MS
QTC INTERVAL: 551 MS
QTC INTERVAL: 677 MS
RIGHT ATRIUM AREA SYSTOLE A4C: 16.6 CM2
RIGHT VENTRICLE ID DIMENSION: 4.4 CM
SL CV LEFT ATRIUM LENGTH A2C: 6.8 CM
SL CV LV EF: 50
SL CV PED ECHO LEFT VENTRICLE DIASTOLIC VOLUME (MOD BIPLANE) 2D: 97 ML
SL CV PED ECHO LEFT VENTRICLE SYSTOLIC VOLUME (MOD BIPLANE) 2D: 55 ML
T WAVE AXIS: 27 DEGREES
T WAVE AXIS: 49 DEGREES
T WAVE AXIS: 53 DEGREES
T WAVE AXIS: 67 DEGREES
T WAVE AXIS: 67 DEGREES
TR MAX PG: 36 MMHG
TR PEAK VELOCITY: 3 M/S
TRICUSPID ANNULAR PLANE SYSTOLIC EXCURSION: 1.4 CM
TRICUSPID VALVE PEAK REGURGITATION VELOCITY: 3.01 M/S
VENTRICULAR RATE: 63 BPM
VENTRICULAR RATE: 64 BPM
VENTRICULAR RATE: 65 BPM
VENTRICULAR RATE: 66 BPM
VENTRICULAR RATE: 66 BPM

## 2023-09-16 PROCEDURE — 99223 1ST HOSP IP/OBS HIGH 75: CPT | Performed by: INTERNAL MEDICINE

## 2023-09-16 PROCEDURE — 93010 ELECTROCARDIOGRAM REPORT: CPT | Performed by: INTERNAL MEDICINE

## 2023-09-16 PROCEDURE — 82948 REAGENT STRIP/BLOOD GLUCOSE: CPT

## 2023-09-16 PROCEDURE — 70450 CT HEAD/BRAIN W/O DYE: CPT

## 2023-09-16 PROCEDURE — 93306 TTE W/DOPPLER COMPLETE: CPT

## 2023-09-16 PROCEDURE — 93306 TTE W/DOPPLER COMPLETE: CPT | Performed by: INTERNAL MEDICINE

## 2023-09-16 PROCEDURE — G1004 CDSM NDSC: HCPCS

## 2023-09-16 PROCEDURE — 84484 ASSAY OF TROPONIN QUANT: CPT

## 2023-09-16 PROCEDURE — 99233 SBSQ HOSP IP/OBS HIGH 50: CPT | Performed by: NEUROLOGICAL SURGERY

## 2023-09-16 PROCEDURE — 99233 SBSQ HOSP IP/OBS HIGH 50: CPT | Performed by: SURGERY

## 2023-09-16 RX ADMIN — ATORVASTATIN CALCIUM 40 MG: 40 TABLET, FILM COATED ORAL at 21:02

## 2023-09-16 RX ADMIN — INSULIN LISPRO 1 UNITS: 100 INJECTION, SOLUTION INTRAVENOUS; SUBCUTANEOUS at 12:47

## 2023-09-16 RX ADMIN — LEVETIRACETAM 500 MG: 100 INJECTION, SOLUTION INTRAVENOUS at 20:21

## 2023-09-16 RX ADMIN — SODIUM CHLORIDE 3 G: 9 INJECTION, SOLUTION INTRAVENOUS at 20:59

## 2023-09-16 RX ADMIN — OFLOXACIN 5 DROP: 3 SOLUTION OPHTHALMIC at 12:47

## 2023-09-16 RX ADMIN — LEVETIRACETAM 500 MG: 100 INJECTION, SOLUTION INTRAVENOUS at 09:29

## 2023-09-16 RX ADMIN — OFLOXACIN 5 DROP: 3 SOLUTION OPHTHALMIC at 18:26

## 2023-09-16 RX ADMIN — CITALOPRAM HYDROBROMIDE 20 MG: 20 TABLET ORAL at 09:29

## 2023-09-16 RX ADMIN — ACETAMINOPHEN 650 MG: 325 TABLET, FILM COATED ORAL at 09:29

## 2023-09-16 RX ADMIN — INSULIN LISPRO 1 UNITS: 100 INJECTION, SOLUTION INTRAVENOUS; SUBCUTANEOUS at 09:32

## 2023-09-16 RX ADMIN — OFLOXACIN 5 DROP: 3 SOLUTION OPHTHALMIC at 22:01

## 2023-09-16 RX ADMIN — OFLOXACIN 5 DROP: 3 SOLUTION OPHTHALMIC at 09:29

## 2023-09-16 RX ADMIN — PANTOPRAZOLE SODIUM 40 MG: 40 TABLET, DELAYED RELEASE ORAL at 05:28

## 2023-09-16 RX ADMIN — OXYCODONE HYDROCHLORIDE 5 MG: 5 TABLET ORAL at 20:20

## 2023-09-16 RX ADMIN — SODIUM CHLORIDE 3 G: 9 INJECTION, SOLUTION INTRAVENOUS at 09:29

## 2023-09-16 NOTE — PROGRESS NOTES
[address] [facility]  Progress Note  Name: Sadie Silveira  MRN: [id number]  Unit/Bed#: PPHP 874-92 I Date of Admission: 9/15/2023   Date of Service: 9/16/2023 I Hospital Day: 1    Assessment/Plan   SDH (subdural hematoma) (HCC)  Assessment & Plan  Acute multi-compartment hemorrhage, SAH and SDH   R temporal bone fracture   Small pneumocephalus   - presents to [facility] ER after a fall backwards with head strike to asphalt   - no LOC   - no AC/AP meds   - current exam: GCS 15, neuro intact. Trace bloody drainage from R ear. Imaging:   · 9/15 CTH: Multicompartment hemorrhage with small foci of subarachnoid hemorrhage in the bilateral inferior frontal region. Small foci of subdural hemorrhage in the right insular region and temporal region without significant mass effect. Additional small foci of subarachnoid hemorrhage versus small contusions in the right temporal region. Air noted within the right sigmoid sinus, transverse sinus with associated fracture of the right temporal bone with small foci of extra-axial air in the right middle cranial fossa and hemorrhage in the right middle ear. Nondisplaced fracture through the right occipital bone  · CT head /wo, 9/16: Scattered hyperdense/acute subarachnoid and subdural hemorrhage in the right frontotemporal region is similar to the study from yesterday. No significant mass effect or new hemorrhage. There is also small amount of isodense to hypodense subdural fluid over the right frontotemporal region, possibly subacute to chronic subdural hemorrhage, also stable. Per my review, stable appearing right temporal bone fracture with resolution of pneumocephalus.  However, increased size of right SDH    Plan:   · Continue to closely monitor neuro exam   · Frequent neuro checks per primary team   · repeat STAT CTH with any acute decline in GCS > 2pts or more in 1 hr   · Repeat CT head in am given increased SDH  · CTA/CTV on hold due to low GFR and CKD 4. Consider in the outpatient setting if there is concern for sinus thrombosis given pneumocephalus within the sinus near the area of fracture. · Maintain normotensive BP goals, SBP < 160   · No acute neurosurgical intervention indicated at this time   · Case and imaging reviewed   · Pt remains GCS 15, neuro intact   · Continue keppra per trauma team protocol   · Hold all AC/AP meds --> not taking at home   · DVT ppx: SCDs, do not start pharm ppx  · ENT consulted for temporal bone fracture, appreciate recs   · Continue medical management per primary team   · Pain control per primary team   · PT/OT   · Social work following for assistance with dispo once medically cleared     Neurosurgery will continue to follow. Please call with questions or concerns                   Subjective/Objective   Chief Complaint: none    Subjective: Patient with no acute events overnight. He denies headache, confusion, speech or vision changes, focal weakness. He has not ambulated. He has a Goins catheter in place. Objective: Patient laying in bed sleeping. Easily arousable. No acute distress. Vital signs stable. Intake/Output                 09/16/23 0701 - 09/17/23 0700     [id number] Total              Intake    P.O.  0  -- 0    Total Intake 0 -- 0       Output    Total Output -- -- --       Net I/O     0 -- 0          Invasive Devices     Peripheral Intravenous Line  Duration           Peripheral IV 09/15/23 Left;Proximal;Ventral (anterior) Forearm <1 day          Drain  Duration           Urostomy -- days    Urostomy Ureterostomy right RLQ <1 day                Vitals: Blood pressure (!) 171/78, pulse 68, temperature 98.3 °F (36.8 °C), resp. rate 22, height 5' 7" (1.702 m), weight 83.5 kg (184 lb), SpO2 99 %. ,Body mass index is 28.82 kg/m².     General appearance: alert, appears stated age, cooperative and no distress  Head: Normocephalic, without obvious abnormality, multiple lacerations and abrasions  Eyes: EOMI, PERRL, conjugate gaze  Neck: supple, symmetrical, trachea midline   Lungs: non labored breathing  Heart: regular heart rate  Neurologic:   Mental status: Alert, oriented x4, thought content appropriate, speech clear and fluent  Cranial nerves: grossly intact (Cranial nerves II-XII), hemotympanum right ear with decreased hearing to finger rub  Sensory: normal to LT  Motor: moving all extremities without focal weakness   Coordination: finger to nose normal bilaterally, no drift bilaterally    Lab Results: I have personally reviewed pertinent results. Results from last 7 days   Lab Units 09/15/23  1306   WBC Thousand/uL 15.04*   HEMOGLOBIN g/dL 10.3*   HEMATOCRIT % 31.4*   PLATELETS Thousands/uL 233   NEUTROS PCT % 83*   MONOS PCT % 8   EOS PCT % 1     Results from last 7 days   Lab Units 09/15/23  1306   POTASSIUM mmol/L 3.8   CHLORIDE mmol/L 103   CO2 mmol/L 16*   BUN mg/dL 57*   CREATININE mg/dL 2.28*   CALCIUM mg/dL 8.5   ALK PHOS U/L 97   ALT U/L 15   AST U/L 17             Results from last 7 days   Lab Units 09/15/23  2128   INR  1.10   PTT seconds 24     No results found for: "TROPONINT"  ABG:  Lab Results   Component Value Date    PHART 7.321 (L) 07/17/2014    WQC3TXR 42.0 07/17/2014    PO2ART 87.8 07/17/2014    XQG8NYL 21 (L) 07/17/2014    BEART -4.6 07/17/2014       Imaging Studies: I have personally reviewed pertinent reports. CT head wo contrast    Result Date: 9/16/2023  Narrative: CT BRAIN - WITHOUT CONTRAST INDICATION:   Subarachnoid hemorrhage [facility]), follow up basilar skull fracture with ICH. COMPARISON: 9/15/2023 TECHNIQUE:  CT examination of the brain was performed. Multiplanar 2D reformatted images were created from the source data. Radiation dose length product (DLP) for this visit:  957.66 mGy-cm .   This examination, like all CT scans performed in the [facility], was performed utilizing techniques to minimize radiation dose exposure, including the use of iterative  reconstruction and automated exposure control. IMAGE QUALITY:  Diagnostic. FINDINGS: PARENCHYMA: Scattered foci of extra-axial hemorrhage adjacent to the right frontal and temporal regions noted with both subdural and subarachnoid elements. There is also a hypodense component, suggestive of acute on subacute to chronic subdural hematoma. The hypodense/isodense right frontotemporal subdural fluid measures up to 0.7 cm in maximal thickness. No subfalcine herniation. No significant increase in size of the hemorrhage. Chronic lacunar infarction in the right caudate noted. Mild periventricular hypodensities noted. Atherosclerotic calcifications noted. VENTRICLES AND EXTRA-AXIAL SPACES: Small amounts of scattered subarachnoid and subdural hemorrhage in the right frontotemporal region stable. VISUALIZED ORBITS: Normal visualized orbits. PARANASAL SINUSES: Normal visualized paranasal sinuses. CALVARIUM AND EXTRACRANIAL SOFT TISSUES: Scalp hematoma posteriorly in the parietal region noted. No skull fracture. Impression: 1. Scattered hyperdense/acute subarachnoid and subdural hemorrhage in the right frontotemporal region is similar to the study from yesterday. No significant mass effect or new hemorrhage. There is also small amount of isodense to hypodense subdural fluid  over the right frontotemporal region, possibly subacute to chronic subdural hemorrhage, also stable. 2. Scalp hematoma parietal region posteriorly redemonstrated. 3. Chronic appearing right caudate lacunar infarction and mild, chronic microangiopathy stable. No new large territorial infarction. Workstation performed: [id number]     Echo complete w/ contrast if indicated    Result Date: 9/16/2023  Narrative: •  Left Ventricle: Left ventricular cavity size is normal. Wall thickness is mildly increased. The left ventricular ejection fraction is 50-55%.  Systolic function is low normal. Abnormal diastolic inflow patterns due to atrial flutter. Left atrial filling pressure is elevated. •  The following segments are akinetic: basal inferior and mid inferior. •  All other segments are normal. •  IVS: There is systolic flattening of the interventricular septum consistent with right ventricle pressure overload. •  Right Ventricle: Right ventricular cavity size is upper normal. Systolic function is moderately reduced. •  Left Atrium: The atrium is moderately dilated. •  Right Atrium: The atrium is mildly dilated. •  Aortic Valve: The aortic valve is trileaflet. The leaflets are moderately calcified. There is mildly reduced mobility. There is trace regurgitation. There is mild stenosis. •  Mitral Valve: There is mild to moderate regurgitation. •  Tricuspid Valve: There is mild regurgitation. CT chest abdomen pelvis w contrast    Result Date: 9/15/2023  Narrative: CT CHEST, ABDOMEN AND PELVIS WITH IV CONTRAST INDICATION:   abd pain. As per review of electronic medical record, patient with history of bladder cancer status post cystoprostatectomy with ileal conduit in October 2020 with recurrence in May 2022 status post chemotherapy and immunotherapy status post fall backwards with head trauma. COMPARISON: CT of the abdomen and pelvis from 8/2/2023, 11/30/2022, and 11/25/2020 PET/CT from 5/1/2023, and chest CT from 3/20/2023 and 11/25/2020. TECHNIQUE: CT examination of the chest, abdomen and pelvis was performed. Multiplanar 2D reformatted images were created from the source data. 3D reconstructions of the bony thorax were performed in order to improve sensitivity of evaluation for rib fractures. Intravenous contrast was administered despite elevated creatinine as the exam was part of a trauma evaluation. This examination, like all CT scans performed in the [facility], was performed utilizing techniques to minimize radiation dose exposure, including the use of iterative reconstruction and automated exposure control.  Radiation dose length product (DLP) for this visit:  1481.67 mGy-cm IV Contrast:  100 mL of iohexol (OMNIPAQUE) Enteric Contrast: Enteric contrast was not administered. FINDINGS: CHEST BRONCHOPULMONARY:  Clear central airways. Calcified granuloma in the right upper lobe. Stable right upper lobe micronodules (series 4 image 80 and 85). There is a 3 mm right middle lobe nodule abutting the minor fissure (series 4 image 113), which is new since March 2023. There is a 4 mm right upper lobe nodule (series 4 image 130) unchanged dating back to November 2020. There is mucous plugging and bronchial in the right lower lobe stimulating the appearance of nodules (series 4 image 171 - 177). PLEURA: Trace bilateral pleural effusions. No pneumothorax. HEART/GREAT VESSELS: There is stable cardiomegaly. No pericardial effusion. The patient is status post median sternotomy and CABG. Normal caliber thoracic aorta. MEDIASTINUM/LYMPH NODES:  No axillary, mediastinal or hilar lymphadenopathy. There is a right cardiophrenic lymph node measuring 8 mm in short axis, more prominent from prior studies. There are also 2 retrocrural lymph nodes measuring 8 mm in short axis, similar to August 2023, however enlarged since March 2023. Small hiatal hernia. CHEST WALL AND LOWER NECK: No hyperdense subcutaneous or intramuscular fluid collections to suggest hematoma. Bilateral gynecomastia. ABDOMEN LIVER/BILIARY TREE:  Normal liver morphology. 2 hypodense lesions in the right inferior hepatic lobe measuring up to 8 mm in size, and higher than simple fluid in density (series 2 image 133). These were not present on the last prior contrast-enhanced CT  from November 2020, unclear whether these were present on the prior unenhanced CTs. There is also a subcentimeter hypodense lesion in the liver more superiorly and anteriorly (series 2 image 116), however this is unchanged from November 2020 and most likely represents a cyst. No biliary ductal dilation.  GALLBLADDER: Gallstones are seen within the gallbladder. No pericholecystic inflammatory change. SPLEEN: Unremarkable. PANCREAS:  Unremarkable. Normal caliber main pancreatic duct. ADRENAL GLANDS:  Unremarkable. KIDNEYS/URETERS:  Symmetric renal enhancement. No intrarenal or ureteral calculi. No hydronephrosis. No focal renal lesions. STOMACH AND BOWEL: Evaluation of the gastrointestinal tract is somewhat limited by underdistention and lack of oral contrast. The patient is status post right lower quadrant ileal conduit creation. No bowel obstruction or convincing inflammation. Scattered colonic diverticuli, however no evidence of diverticulitis. APPENDIX: Normal appendix. ABDOMINOPELVIC CAVITY:  No pneumoperitoneum. No hyperdense abdominal or pelvic fluid collections to suggest hematoma. Small amount of free simple fluid in the pelvis. There is new peritoneal nodularity along the paracolic gutter, suggestive of peritoneal carcinomatosis (series 604 image 66 and series 65). There is a 1.5 x 1.2 cm soft tissue density nodule in the mesorectal fat on the left (series 2 image 222), new since August 2023. LYMPH NODES: There has been mild interval enlargement of previously seen right-sided retroperitoneal lymph nodes. For instance an aortocaval lymph node 1.2 cm in short axis (series 2 image 151), compared to 0.9 cm on 8/2/2023. A more distal aortocaval lymph node measures 0.9 cm in short axis (series 2 image 163), compared to 0.7 cm previously. There has been continued interval enlargement of 2 left-sided mesenteric lymph nodes. The more anterior lymph node measures 2.0 x 1.9 cm (series 2 image 175), compared to 1.7 x 1.7 cm on 8/2/2023. The more posterior lymph node measures 2.4 x 1.9 cm (series 2 image 173), compared to 2.1 x 1.7 cm. A right external iliac lymph node measures 1.4 cm in short axis (series 2 image 235), increased since August 2023.  There is hazy density around the mesenteric lymph nodes at the root of the mesentery, more prominent compared to prior studies. There is a small hypodense fluid density structure adjacent to a surgical clip along the right pelvic sidewall (series 2 image 211). It measures 1.7 cm in short axis on the current study, similar to August 2023, however is enlarged and new from older studies. VESSELS: Normal caliber aorta. Patent major branch vessels. Scattered atherosclerotic calcifications in the abdominal aorta and its major branches. There are resultant multifocal stenoses in the major branches. PELVIS REPRODUCTIVE ORGANS/URINARY BLADDER: The patient is status post cystoprostatectomy with right lower quadrant ileal conduit. There is a soft tissue density nodule measuring approximately 1.5 cm in diameter (series 2 image 233) inseparable from the left obturator internus muscle corresponding to an area of increased FDG activity on the May 2023 PET/CT. Some free fluid is seen in the surgical bed as well with no organized collection. ABDOMINAL WALL/INGUINAL REGIONS: No ventral abdominal wall hernia. MUSCULOSKELETAL:  No acute fracture. No focal aggressive osseous lesions. Degenerative changes of the spine. Please see separate report for the CT of the spine performed concurrently. No hyperdense subcutaneous or intramuscular fluid collections to suggest hematoma. Impression: 1) No acute thoracic, abdominal or pelvic trauma. 2) 1.5 cm soft tissue density nodule inseparable from the left obturator internus muscle, corresponding to the focus of increased FDG activity on May 2023 PET/CT and concerning for local recurrence. 3) Interval increase in size of 2 dominant left-sided mesenteric lymph nodes, as well as mild interval increase in size of a right external iliac lymph node and at least 2 right-sided retroperitoneal lymph nodes since 8/2/2023 CT, concerning for metastatic disease.  4) Hypodense structure along the right pelvic sidewall is similar to August 2023, however is new from older studies, concerning for a necrotic lymph node metastasis. 5) Two hypodense lesions measuring higher than simple fluid in density in the right inferior hepatic lobe, not present on prior contrast-enhanced CT from November 2020, concerning for metastatic disease. 6) Peritoneal soft tissue nodularity in the left paracolic gutter, concerning for peritoneal carcinomatosis. 7) 1.5 cm implant in the mesorectal fat on the left, new since August 2023. 8) Nonspecific right cardiophrenic lymph node measuring 8 mm in short axis and 2 retrocrural lymph nodes also measuring up to 8 mm, more prominent compared to older studies, also possible metastases. 9) Nonspecific 3 mm right middle lobe nodule, new since March 2023. Attention on follow-up. 10) Multiple additional findings as above. The study was marked in [facility] for immediate notification. Workstation performed: [id number]     CT spine thoracic & lumbar wo contrast    Result Date: 9/15/2023  Narrative: CT LUMBAR SPINE INDICATION:   Low back pain, trauma ttp. COMPARISON: None. TECHNIQUE:  Contiguous axial images through the lumbar spine were obtained. Sagittal and coronal reconstructions were performed. IV Contrast: 100 mL of iohexol (OMNIPAQUE) Radiation dose length product (DLP) for this visit:  0 mGy-cm . This examination, like all CT scans performed in the [facility], was performed utilizing techniques to minimize radiation dose exposure, including the use of iterative reconstruction and automated exposure control. IMAGE QUALITY:  Diagnostic. FINDINGS: ALIGNMENT:  There are 5 lumbar type vertebral bodies. Trace anterolisthesis of L4-L5. VERTEBRAE:  No fracture. No lytic or blastic lesion. Schmorl's node along the inferior endplate of L1. DEGENERATIVE CHANGES: There are multilevel degenerative changes of the lumbar spine. Multifactorial disease results in overall mild canal stenosis and mild foraminal narrowing at L3-L4. There is a bulging annulus.  There is facet arthrosis with ligament flavum thickening at L4-L5. This results in at least moderate canal stenosis. There is mild to moderate bilateral foraminal narrowing. PARASPINAL SOFT TISSUES: No paravertebral soft tissue hematoma. Correlate with separate CT of the chest, abdomen and pelvis for additional findings. Impression: No acute fractures. No evidence for traumatic malalignment. Workstation performed: [id number]     CT recon (no charge)    Result Date: 9/15/2023  Narrative: CT TEMPORAL BONES WITHOUT CONTRAST INDICATION:   fall concern specifically for base of skull fx. . COMPARISON: Same day CT head and cervical spine without contrast TECHNIQUE: Using a multi-detector scanner, 0.625 mm axial scans of the temporal bone were acquired using a high-resolution bone technique. Targeted reconstructions were obtained of each side, including axial, coronal, and oblique views. All reconstructed images were reviewed. Soft tissue reconstructions were performed as well. Radiation dose length product (DLP) for this visit:  0 mGy-cm . This examination, like all CT scans performed in the [facility], was performed utilizing techniques to minimize radiation dose exposure, including the use of iterative reconstruction and automated exposure control. IV Contrast: None IMAGE QUALITY:  Diagnostic. FINDINGS: RIGHT TEMPORAL BONE: Acute comminuted otic sparing right temporal bone fracture involving the squamosal, mastoid, and tympanic portions with diastases of occipitotemporal suture and extension of fracture into visualized right occipital calvarium. PERIAURICULAR SOFT TISSUES: Small scalp hematoma in right occipito-temporal region. EXTERNAL AUDITORY CANAL: Debris in right external auditory canal likely containing some blood products. MIDDLE EAR: Large middle ear effusion involving epitympanum, mesotympanum, and hypotympanum - likely containing blood products. OSSICLES: Middle ear ossicles are intact. No evidence of dislocation. MASTOID AIR CELLS: Please see above discussion regarding fracture. Small-to-moderate right mastoid effusion with blood products. COCHLEA: Normal. OTIC CAPSULE: Normal mineralization. VESTIBULE: Normal. VESTIBULAR AND COCHLEAR AQUEDUCT: Normal SEMICIRCULAR CANALS: Normal. INTERNAL AUDITORY CANAL: Normal. FACIAL NERVE CANAL: Normal. CAROTID CANAL: Normal. JUGULAR FORAMEN: Normal. LEFT TEMPORAL BONE: PERIAURICULAR SOFT TISSUES:  Normal. EXTERNAL AUDITORY CANAL: Normal. MIDDLE EAR: Normal. OSSICLES:Normal. MASTOID AIR CELLS: Trace left mastoid effusion. Otherwise, normal. COCHLEA: Normal. OTIC CAPSULE: Normal mineralization. VESTIBULE: Normal. VESTIBULAR AND COCHLEAR AQUEDUCT: Normal SEMICIRCULAR CANALS: Normal. INTERNAL AUDITORY CANAL: Normal. FACIAL NERVE CANAL: Normal. CAROTID CANAL: Normal. JUGULAR FORAMEN: Normal. OTHER FINDINGS: Please see same day CT head without contrast for further evaluation especially given right-sided subarachnoid hemorrhage and small volume pneumocephalus. Impression: RIGHT TEMPORAL BONE CT - Acute comminuted otic sparing right temporal bone fracture involving the squamosal, mastoid, and tympanic portions with diastases of occipitotemporal suture and extension of fracture into visualized right occipital calvarium. Recommend follow-up CTV head with contrast to assess underlying patency of right dural venous sinuses. - Probable small-to-moderate RIGHT-sided bloody mastoid effusion, large bloody middle ear effusion, and probable debris and blood products throughout external auditory canal. - Small scalp hematoma in right occipito-temporal region. LEFT TEMPORAL BONE CT -Trace left mastoid effusion. Otherwise, normal LEFT temporal bone CT. Please see same day CT head without contrast for further evaluation especially given right-sided subarachnoid hemorrhage and small volume pneumocephalus. The study was marked in [facility] for immediate notification.  Workstation performed: [id number]     [address] [place] [address] bedside procedure    Result Date: 9/15/2023  Narrative: [id number]. [id number]    CT head without contrast    Result Date: 9/15/2023  Narrative: CT BRAIN - WITHOUT CONTRAST INDICATION:   Head trauma, minor (Age >= 65y) occipital trauma. COMPARISON:  None. TECHNIQUE:  CT examination of the brain was performed. Multiplanar 2D reformatted images were created from the source data. Radiation dose length product (DLP) for this visit:  1033.52 mGy-cm . This examination, like all CT scans performed in the [facility], was performed utilizing techniques to minimize radiation dose exposure, including the use of iterative reconstruction and automated exposure control. IMAGE QUALITY:  Diagnostic. FINDINGS: PARENCHYMA: There is small amount of right frontal extra-axial hemorrhage in the subdural region with associated subarachnoid hemorrhage in the inferior right frontal region, image 34 series 3 additional area of extra-axial hemorrhage right insular region, measuring about 6 mm Small areas of hemorrhagic contusion versus foci of additional subarachnoid hemorrhage in the left temporal region, image 29 series 3. Small foci of subarachnoid hemorrhage in the inferior left frontal region, image 32 series 3 There is no midline shift. VENTRICLES AND EXTRA-AXIAL SPACES: Ventricles appear unremarkable. There is no intraventricular hemorrhage seen Air noted within the right sigmoid sinus and the right transverse sinus with associated fracture of the right temporal bone and right occipital bone with small amount of subdural air VISUALIZED ORBITS: Normal visualized orbits. PARANASAL SINUSES: No air-fluid level seen in the paranasal sinuses Soft tissue swelling over the right temporal region CALVARIUM AND EXTRACRANIAL SOFT TISSUES: Hematoma in the left parietal vertex and left occipital region Nondisplaced fracture through the right temporal bone.  Linear nondisplaced fracture through the posterior wall of the condyle with the right middle ear hemorrhage. Fracture passes through the hypotympanum Nondisplaced fracture through the right occipital bone    Impression: Multicompartment hemorrhage with small foci of subarachnoid hemorrhage in the bilateral inferior frontal region. Small foci of subdural hemorrhage in the right insular region and temporal region without significant mass effect Additional small foci of subarachnoid hemorrhage versus small contusions in the right temporal region Air noted within the right sigmoid sinus, transverse sinus with associated fracture of the right temporal bone with small foci of extra-axial air in the right middle cranial fossa and hemorrhage in the right middle ear. Nondisplaced fracture through the right occipital bone I personally discussed this study with Lilian Castaneda on 9/15/2023 3:01 PM. Workstation performed: [id number]     CT cervical spine without contrast    Result Date: 9/15/2023  Narrative: CT CERVICAL SPINE - WITHOUT CONTRAST INDICATION:   Neck trauma (Age >= 65y) fall, edlerly. COMPARISON:  None. TECHNIQUE:  CT examination of the cervical spine was performed without intravenous contrast.  Contiguous axial images were obtained. Multiplanar 2D reformatted images were created from the source data. Radiation dose length product (DLP) for this visit:  451.15 mGy-cm . This examination, like all CT scans performed in the [facility], was performed utilizing techniques to minimize radiation dose exposure, including the use of iterative  reconstruction and automated exposure control. IMAGE QUALITY:  Diagnostic. FINDINGS: ALIGNMENT:  Normal alignment of the cervical spine. No subluxation. VERTEBRAE:  No fracture. DEGENERATIVE CHANGES: Degenerative changes seen at the atlantoaxial joint.  C2-3 level demonstrates ridging with uncovertebral spurring with no significant right or left foraminal narrowing C3-4 demonstrates uncovertebral spurring with no significant right and moderate left foraminal narrowing. C4-5 demonstrates uncovertebral spurring with moderate to severe right foraminal narrowing and no significant left foraminal narrowing C5-6 demonstrates ridging with uncovertebral spurring with mild left and no significant right foraminal narrowing C6/7 demonstrates ridging with moderate left and mild right foraminal narrowing and mild central canal narrowing C7-T1 level appear unremarkable PREVERTEBRAL AND PARASPINAL SOFT TISSUES: Unremarkable Right thyroid nodule seen which measures about 1.8 cm THORACIC INLET:  Normal.     Impression: No acute compression collapse of the vetebra Fracture of the right temporal bone, incompletely assessed Incidental 1.8 cm right thyroid nodule, meets the size/criteria for further assessment with ultrasound for an incidentally detected nodule Workstation performed: [id number]     XR chest 1 view portable    Result Date: 9/15/2023  Narrative: CHEST INDICATION:   Cough. COMPARISON: Chest radiograph April 11, 2023 EXAM PERFORMED/VIEWS:  XR CHEST PORTABLE FINDINGS: There are median sternotomy wires indicating prior cardiac surgery. Cardiomediastinal silhouette appears unremarkable. Chronic loculated small left pleural effusion is similar to prior study. No focal consolidation. No pneumothorax. Osseous structures appear within normal limits for patient age. Impression: No acute cardiopulmonary disease. Workstation performed: [id number]     EKG, Pathology, and Other Studies: I have personally reviewed pertinent reports.       VTE Pharmacologic Prophylaxis: none    VTE Mechanical Prophylaxis: sequential compression device

## 2023-09-16 NOTE — PLAN OF CARE
Problem: MOBILITY - ADULT  Goal: Maintain or return to baseline ADL function  Description: INTERVENTIONS:  -  Assess patient's ability to carry out ADLs; assess patient's baseline for ADL function and identify physical deficits which impact ability to perform ADLs (bathing, care of mouth/teeth, toileting, grooming, dressing, etc.)  - Assess/evaluate cause of self-care deficits   - Assess range of motion  - Assess patient's mobility; develop plan if impaired  - Assess patient's need for assistive devices and provide as appropriate  - Encourage maximum independence but intervene and supervise when necessary  - Involve family in performance of ADLs  - Assess for home care needs following discharge   - Consider OT consult to assist with ADL evaluation and planning for discharge  - Provide patient education as appropriate  Outcome: Progressing     Problem: PAIN - ADULT  Goal: Verbalizes/displays adequate comfort level or baseline comfort level  Description: Interventions:  - Encourage patient to monitor pain and request assistance  - Assess pain using appropriate pain scale  - Administer analgesics based on type and severity of pain and evaluate response  - Implement non-pharmacological measures as appropriate and evaluate response  - Consider cultural and social influences on pain and pain management  - Notify physician/advanced practitioner if interventions unsuccessful or patient reports new pain  Outcome: Progressing     Problem: INFECTION - ADULT  Goal: Absence or prevention of progression during hospitalization  Description: INTERVENTIONS:  - Assess and monitor for signs and symptoms of infection  - Monitor lab/diagnostic results  - Monitor all insertion sites, i.e. indwelling lines, tubes, and drains  - Monitor endotracheal if appropriate and nasal secretions for changes in amount and color  - [place] appropriate cooling/warming therapies per order  - Administer medications as ordered  - Instruct and encourage patient and family to use good hand hygiene technique  - Identify and instruct in appropriate isolation precautions for identified infection/condition  Outcome: Progressing     Problem: SAFETY ADULT  Goal: Maintain or return to baseline ADL function  Description: INTERVENTIONS:  -  Assess patient's ability to carry out ADLs; assess patient's baseline for ADL function and identify physical deficits which impact ability to perform ADLs (bathing, care of mouth/teeth, toileting, grooming, dressing, etc.)  - Assess/evaluate cause of self-care deficits   - Assess range of motion  - Assess patient's mobility; develop plan if impaired  - Assess patient's need for assistive devices and provide as appropriate  - Encourage maximum independence but intervene and supervise when necessary  - Involve family in performance of ADLs  - Assess for home care needs following discharge   - Consider OT consult to assist with ADL evaluation and planning for discharge  - Provide patient education as appropriate  Outcome: Progressing     Problem: DISCHARGE PLANNING  Goal: Discharge to home or other facility with appropriate resources  Description: INTERVENTIONS:  - Identify barriers to discharge w/patient and caregiver  - Arrange for needed discharge resources and transportation as appropriate  - Identify discharge learning needs (meds, wound care, etc.)  - Arrange for interpretive services to assist at discharge as needed  - Refer to Case Management Department for coordinating discharge planning if the patient needs post-hospital services based on physician/advanced practitioner order or complex needs related to functional status, cognitive ability, or social support system  Outcome: Progressing     Problem: Knowledge Deficit  Goal: Patient/family/caregiver demonstrates understanding of disease process, treatment plan, medications, and discharge instructions  Description: Complete learning assessment and assess knowledge base.  Interventions:  - Provide teaching at level of understanding  - Provide teaching via preferred learning methods  Outcome: Progressing     Problem: Prexisting or High Potential for Compromised Skin Integrity  Goal: Skin integrity is maintained or improved  Description: INTERVENTIONS:  - Identify patients at risk for skin breakdown  - Assess and monitor skin integrity  - Assess and monitor nutrition and hydration status  - Monitor labs   - Assess for incontinence   - Turn and reposition patient  - Assist with mobility/ambulation  - Relieve pressure over bony prominences  - Avoid friction and shearing  - Provide appropriate hygiene as needed including keeping skin clean and dry  - Evaluate need for skin moisturizer/barrier cream  - Collaborate with interdisciplinary team   - Patient/family teaching  - Consider wound care consult   Outcome: Progressing

## 2023-09-16 NOTE — ASSESSMENT & PLAN NOTE
- troponin peaked at  458 overnight  - no active chest pain   - EKG shows no ischemia  - Cards consult, appreciate recs  - ECHO performed: EF 54% (see details of report 9/15/23)

## 2023-09-16 NOTE — ASSESSMENT & PLAN NOTE
Acute multi-compartment hemorrhage, SAH and SDH   R temporal bone fracture   Small pneumocephalus   - presents to [facility] ER after a fall backwards with head strike to asphalt   - no LOC   - no AC/AP meds   - current exam: GCS 15, neuro intact. Trace bloody drainage from R ear. Imaging:   · 9/15 CTH: Multicompartment hemorrhage with small foci of subarachnoid hemorrhage in the bilateral inferior frontal region. Small foci of subdural hemorrhage in the right insular region and temporal region without significant mass effect. Additional small foci of subarachnoid hemorrhage versus small contusions in the right temporal region. Air noted within the right sigmoid sinus, transverse sinus with associated fracture of the right temporal bone with small foci of extra-axial air in the right middle cranial fossa and hemorrhage in the right middle ear. Nondisplaced fracture through the right occipital bone  · CT head /wo, 9/16: Scattered hyperdense/acute subarachnoid and subdural hemorrhage in the right frontotemporal region is similar to the study from yesterday. No significant mass effect or new hemorrhage. There is also small amount of isodense to hypodense subdural fluid over the right frontotemporal region, possibly subacute to chronic subdural hemorrhage, also stable. Per my review, stable appearing right temporal bone fracture with resolution of pneumocephalus. However, increased size of right SDH    Plan:   · Continue to closely monitor neuro exam   · Frequent neuro checks per primary team   · repeat STAT CTH with any acute decline in GCS > 2pts or more in 1 hr   · Repeat CT head in am given increased SDH  · CTA/CTV on hold due to low GFR and CKD 4. Consider in the outpatient setting if there is concern for sinus thrombosis given pneumocephalus within the sinus near the area of fracture.   · Maintain normotensive BP goals, SBP < 160   · No acute neurosurgical intervention indicated at this time   · Case and imaging reviewed   · Pt remains GCS 15, neuro intact   · Continue keppra per trauma team protocol   · Hold all AC/AP meds --> not taking at home   · DVT ppx: SCDs, do not start pharm ppx  · ENT consulted for temporal bone fracture, appreciate recs   · Continue medical management per primary team   · Pain control per primary team   · PT/OT   · Social work following for assistance with dispo once medically cleared     Neurosurgery will continue to follow.  Please call with questions or concerns

## 2023-09-16 NOTE — ASSESSMENT & PLAN NOTE
Acute multi-compartment hemorrhage, SAH and SDH   R temporal bone fracture   Small pneumocephalus   - presents to [facility] ER after a fall backwards with head strike to asphalt   - no LOC   - no AC/AP meds   - current exam: GCS 15, neuro intact. Trace bloody drainage from R ear. Imaging:   · 9/15 CTH: Multicompartment hemorrhage with small foci of subarachnoid hemorrhage in the bilateral inferior frontal region. Small foci of subdural hemorrhage in the right insular region and temporal region without significant mass effect. Additional small foci of subarachnoid hemorrhage versus small contusions in the right temporal region. Air noted within the right sigmoid sinus, transverse sinus with associated fracture of the right temporal bone with small foci of extra-axial air in the right middle cranial fossa and hemorrhage in the right middle ear. Nondisplaced fracture through the right occipital bone  · CT head /wo, 9/16: Scattered hyperdense/acute subarachnoid and subdural hemorrhage in the right frontotemporal region is similar to the study from yesterday. No significant mass effect or new hemorrhage. There is also small amount of isodense to hypodense subdural fluid over the right frontotemporal region, possibly subacute to chronic subdural hemorrhage, also stable. Per my review, stable appearing right temporal bone fracture with resolution of pneumocephalus. Plan:   · Continue to closely monitor neuro exam   · Frequent neuro checks per primary team   · repeat STAT CTH with any acute decline in GCS > 2pts or more in 1 hr   · CTA/CTV on hold due to low GFR and CKD 4. Consider in the outpatient setting if there is concern for sinus thrombosis given pneumocephalus within the sinus near the area of fracture.   · Maintain normotensive BP goals, SBP < 160   · No acute neurosurgical intervention indicated at this time   · Case and imaging reviewed   · Pt remains GCS 15, neuro intact   · Continue keppra per trauma team protocol   · Hold all AC/AP meds --> not taking at home   · DVT ppx: SCDs, ok to start pharm ppx given stable imaging  · ENT consulted for temporal bone fracture, appreciate recs   · Continue medical management per primary team   · Pain control per primary team   · PT/OT   · Social work following for assistance with dispo once medically cleared     Neurosurgery will sign off at this time. Patient will follow-up in 2 weeks with repeat CT head.   Please call with questions or concerns

## 2023-09-16 NOTE — CONSULTS
Consultation - Nephrology   May Catherine 80 y.o. male MRN: [id number]  Unit/Bed#: [facility] 603-01 Encounter: [id number]    ASSESSMENT and PLAN:  #1 chronic kidney stage IV baseline creatinine around 2.5-2.8, follows with Dr. Jessie Alcaraz as an outpatient. Kidney function is stable based on last creatinine at 2.28 yesterday  Patient underwent a CT scan chest abdomen and pelvis with IV contrast yesterday. Follow labs in the morning. IF NO urgent indication for contrasted study at this moment, then recommend to follow labs over the next 24 to 48 hours that initial contrast exposure, if kidney function remains stable, then he will need IV fluids pre and post prior to CTV early next week    2 status post fall complicated with temporal bone fracture. ENT and neurosurgery on board. #3 acute multicompartment hemorrhage, SAH and SDH, neurosurgery on board. #4 non-MI troponin elevation, history of CAD status post CABG, cardiology consulted    5 hypertension, blood pressure is stable in the upper limit, continue current regimen    6 anemia, suspected component of chronic kidney disease, last hemoglobin 10.3 yesterday, follow CBC daily      SUMMARY OF RECOMMENDATIONS:  Discussed with trauma team, status post CT abdomen and pelvis with IV contrast on 9/15. Follow labs in the morning. At this moment no urgent indication for CTV/CTA  Recommend to follow daily labs, avoid relative hypotension. If his renal function remains stable over the next 24 to 48 hours after initial contrast exposure, then okay to proceed with CTV, will need IV fluids pre and post and hold diuretics prior to procedure  Trauma team agrees with the plan    HISTORY OF PRESENT ILLNESS:  Requesting Physician: Lala Felix MD  Reason for Consult: CKD, possible need for contrasted study    May Catherine is a 80 y.o. male who was admitted to [address] [place] [address] after presenting with status post fall.  A renal consultation is requested today for assistance in the management of CKD and possible need of contrasted study. Patient with history of CKD stage IV, diastolic CHF, CAD post CABG, bladder cancer status post ileal conduit, hypertension, diabetes, upper cholesterolemia, nephrolithiasis, presents to the hospital after a fall, was admitted to trauma team, found to have a right otic sparing temporal bone fracture as well as acute multicompartment hemorrhage, SAH and SDH. Patient underwent CAT scan with dye on admission on 9/15. Nephrology was consulted given advanced CKD and due to consideration of CTV/CTA as per neurosurgery recommendation.     During my evaluation patient in general doing okay, denies significant complaints, no chest pain, no shortness of breath, no abdominal pain, no nausea, no vomiting, no significant headaches    PAST MEDICAL HISTORY:  Past Medical History:   Diagnosis Date   • Acute kidney injury ([address])    • Anemia Jan. 2022   • Arthritis     Hands   • Wright esophagus    • BPH with obstruction/lower urinary tract symptoms    • CAD (coronary artery disease)     Last assessed 09/16/15   • Cancer ([address])     bladder   • Cataract, acquired     Last assessed 10/10/17   • Chronic kidney disease    • Coronary artery disease    • Diabetes mellitus ([address])     NIDDM   • Diabetic neuropathy (HCC)     Feet   • Enlarged prostate with lower urinary tract symptoms (LUTS)     Last assessed 10/10/17   • Erectile dysfunction    • GERD (gastroesophageal reflux disease)     Last assessed 10/10/17   • Hemoptysis     Last assessed 03/14/16   • Hypercholesterolemia     Last assessed 10/10/17   • Hypertension     Last assessed 10/10/17   • Kidney stone    • Macular degeneration     Right eye is particularly affected-peripheral vision intact   • Myocardial infarction [facility]) 1998   • OAB (overactive bladder)    • Testicular hypofunction    • Testicular hypogonadism     Last assessed 10/10/17   • Tinnitus    • Umbilical hernia     Last assessed 06/18/14   • Urge incontinence of urine    • Wears glasses        PAST SURGICAL HISTORY:  Past Surgical History:   Procedure Laterality Date   • BLADDER SURGERY     • COLONOSCOPY     • CORONARY ARTERY BYPASS GRAFT  07/16/2014    ABG x 4 LIMA to LAD,SVG to diagnoal 2 SVG to OM-1, SVG to PDA, resection of partial plerual mass   • CT GUIDED PERC DRAINAGE CATHETER PLACEMENT  02/19/2021   • CYSTOSCOPY  2013   • CYSTOSCOPY  02/08/2022    [place]   • ESOPHAGOGASTRODUODENOSCOPY     • FL RETROGRADE PYELOGRAM  12/20/2018   • FL RETROGRADE PYELOGRAM  12/05/2019   • INGUINAL HERNIA REPAIR Bilateral 2015   • IR DRAINAGE TUBE CHECK AND/OR REMOVAL  03/05/2021   • PHOTODYNAMIC THERAPY      For Barretts esophagus   • MT COLONOSCOPY FLX DX W/COLLJ SPEC WHEN PFRMD N/A 04/25/2016    Procedure: COLONOSCOPY;  Surgeon: Presley Malhotra MD;  Location:  GI LAB; Service: Gastroenterology   • MT CYSTO W/REMOVAL OF LESIONS SMALL N/A 12/05/2019    Procedure: CYSTO W/TURBT AND TRANSURETHRAL PROSTATE BIOPSY AND OPENING OF BLADDER NECK CONTRACTURE, B/L Retrograde pyelogram;  Surgeon: Nuvia Giang MD;  [facility];  Service: Urology   • MT CYSTOURETHROSCOPY W/DEST &/RMVL MED BLADDER TY N/A 04/16/2020    Procedure: CYSTOSCOPY, TRANSURETHRAL RESECTION OF BLADDER TUMOR (TURBT);   Surgeon: Nuvia Giang MD;  [facility];  Service: Urology   • MT CYSTOURETHROSCOPY WITH BIOPSY N/A 09/10/2020    Procedure: CYSTOSCOPY, COLLECTION OF LEFT KIDNEY CYTOLOGY, BILATERAL RETROGRADE PYELOGRAM, BLADDER WALL BIOPSIES  AND FULGERAION, RANDOM BLADDER TUMOR BIOPSIES;  Surgeon: Nuvia Giang MD;  Location: [address] [place] [address] MAIN OR;  Service: Urology   • MT CYSTOURETHROSCOPY WITH BIOPSY N/A 04/05/2022    Procedure: urethroscopy , biopsy of urethra with fulgeration;  Surgeon: Johnny Wiley MD;  Location:  MAIN OR;  Service: Urology   • PROSTATE SURGERY     • TONSILLECTOMY     • TRANSURETHRAL RESECTION OF PROSTATE N/A 12/20/2018    Procedure: Young  VAPORIZATION OF PROSTATE, B/L RETROGRADE PYELOGRAM, TURBT;  Surgeon: Fortunato Ayala MD;  [facility];  Service: Urology   • UMBILICAL HERNIA REPAIR     • UPPER GASTROINTESTINAL ENDOSCOPY         SOCIAL HISTORY:  Social History     Substance and Sexual Activity   Alcohol Use Not Currently   • Alcohol/week: 2.0 standard drinks of alcohol   • Types: 1 Glasses of wine, 1 Cans of beer per week    Comment: Non since 7/15/2020     Social History     Substance and Sexual Activity   Drug Use Never     Social History     Tobacco Use   Smoking Status Former   • Packs/day: 1.00   • Years: 10.00   • Total pack years: 10.00   • Types: Cigarettes   • Quit date: 1972   • Years since quittin.7   Smokeless Tobacco Never   Tobacco Comments    Quit at age 28       FAMILY HISTORY:  Family History   Problem Relation Age of Onset   • Cancer Mother         Topical oral abrasian caused cancer   • Other Mother         Digestive System Complications   • Diabetes Father    • Heart disease Father    • Hypertension Father    • Coronary artery disease Father    • Hyperlipidemia Father        ALLERGIES:  Allergies   Allergen Reactions   • Fentanyl Hallucinations   • Morphine And Related Other (See Comments)     While having an MI patient received morphine and had adverse reaction but doesn't know what happened.        MEDICATIONS:    Current Facility-Administered Medications:   •  acetaminophen (TYLENOL) tablet 650 mg, 650 mg, Oral, Q6H PRN, Daisha Huber MD, 650 mg at 23 [id number]  •  ampicillin-sulbactam (UNASYN) 3 g in sodium chloride 0.9 % 100 mL IVPB, 3 g, Intravenous, Q12H, Daisha Huber MD, Last Rate: 200 mL/hr at 23, 3 g at 23  •  atorvastatin (LIPITOR) tablet 40 mg, 40 mg, Oral, HS, Daisha Huber MD, 40 mg at 09/15/23 2129  •  citalopram (CeleXA) tablet 20 mg, 20 mg, Oral, Daily, Daisha Huber MD, 20 mg at 23  •  HYDROmorphone HCl (DILAUDID) injection 0.2 mg, 0.2 mg, Intravenous, Q6H PRN, Demi Alvarado MD  •  insulin lispro (HumaLOG) 100 units/mL subcutaneous injection 1-6 Units, 1-6 Units, Subcutaneous, TID AC, 1 Units at 09/16/23 0932 **AND** Fingerstick Glucose (POCT), , , TID AC, Demi Alvarado MD  •  levETIRAcetam (KEPPRA) 500 mg in sodium chloride 0.9 % 100 mL IVPB, 500 mg, Intravenous, Q12H [facility], Reena Gandhi PA-C, Last Rate: 400 mL/hr at 09/16/23 0929, 500 mg at 09/16/23 0929  •  ofloxacin (OCUFLOX) 0.3 % ophthalmic solution 5 drop, 5 drop, Right Eye, 4x Daily, Stephan Chopra DO, 5 drop at 09/16/23 0929  •  ondansetron (ZOFRAN) injection 4 mg, 4 mg, Intravenous, Q6H PRN, Reena Gandhi PA-C, 4 mg at 09/15/23 1711  •  oxyCODONE (ROXICODONE) IR tablet 5 mg, 5 mg, Oral, Q8H PRN, Demi Alvarado MD  •  oxyCODONE (ROXICODONE) split tablet 2.5 mg, 2.5 mg, Oral, Q8H PRN, Demi Alvarado MD  •  pantoprazole (PROTONIX) EC tablet 40 mg, 40 mg, Oral, Early Morning, Demi Alvarado MD, 40 mg at 09/16/23 5638    REVIEW OF SYSTEMS:  All the systems were reviewed and were negative except as documented on the HPI.     PHYSICAL EXAM:  Current Weight: Weight - Scale: 83.5 kg (184 lb)  First Weight: Weight - Scale: 83.5 kg (184 lb)  Vitals:    09/16/23 0321 09/16/23 0804 09/16/23 0915 09/16/23 1137   BP: 154/70 151/70 151/70 (!) 171/78   Pulse: 65 67 67 68   Resp: 16 18  22   Temp: 98.4 °F (36.9 °C) 99.5 °F (37.5 °C)  98.3 °F (36.8 °C)   TempSrc:       SpO2: 99% 96%  99%   Weight:   83.5 kg (184 lb)    Height:   5' 7" (1.702 m)        Intake/Output Summary (Last 24 hours) at 9/16/2023 1157  Last data filed at 9/15/2023 2100  Gross per 24 hour   Intake 500 ml   Output 1200 ml   Net -700 ml       General: conscious, cooperative, in not acute distress  Eyes: conjunctivae pink, anicteric sclerae  ENT: lips and mucous membranes moist, on room air  Neck: supple, no JVD  Chest: clear breath sounds bilateral, no crackles, ronchus or wheezings  CVS: distinct S1 & S2, normal rate, regular rhythm  Abdomen: soft, non-tender, non-distended, normoactive bowel sounds, ileal conduit in place  Extremities: no edema of both legs  Skin: no rash  Neuro: awake, alert, oriented      Invasive Devices:        Lab Results:   Results from last 7 days   Lab Units 09/15/23  1306   WBC Thousand/uL 15.04*   HEMOGLOBIN g/dL 10.3*   HEMATOCRIT % 31.4*   PLATELETS Thousands/uL 233   SODIUM mmol/L 134*   POTASSIUM mmol/L 3.8   CHLORIDE mmol/L 103   CO2 mmol/L 16*   BUN mg/dL 57*   CREATININE mg/dL 2.28*   CALCIUM mg/dL 8.5   ALK PHOS U/L 97   ALT U/L 15   AST U/L 17       Other Studies:   Chest x-ray on 9/15 chronic loculated small left pleural effusion, no consolidation or pneumothorax      Portions of the record may have been created with voice recognition software. Occasional wrong word or "sound a like" substitutions may have occurred due to the inherent limitations of voice recognition software. Read the chart carefully and recognize, using context, where substitutions have occurred. If you have any questions, please contact the dictating provider.

## 2023-09-16 NOTE — PROGRESS NOTES
[address] [facility]  Progress Note  Name: Sadie Silveira  MRN: [id number]  Unit/Bed#: [facility] 147-55 I Date of Admission: 9/15/2023   Date of Service: 9/16/2023 I Hospital Day: 1    Assessment/Plan   Fall  Assessment & Plan  - Status post fall with noted injuries. - Fall precautions. - Geriatric Medicine consultation for evaluation, medication review and recommendations.  - PT and OT evaluation and treatment as indicated. - Case Management consultation for disposition planning. Open fracture of temporal bone [facility])  Assessment & Plan  Temporal bone fracture noted on CT 9/15, with associated subdural hematoma, subarachnoid hemorrhage. Noted continued bleeding from right ear s/p administration of afrin  - Neuro exam: stable  - Continue neuro checks  - Appreciate neurosurgery and ENT recommendations  - Unasyn for treatment of open fracture  - Complete 7 day course of Keppra for seizure prophylaxis  - Chemical DVT prophylaxis: Not cleared for chemical prophylaxis by neurosurgery at this time. Continue SCDs bilaterally. - Hold all anticoagulants and anti platelet medications until cleared by neurosurgery to resume  - PT/OT and cognitive evaluation  - Consider CTV/CTA per neurosurgery recommendation given patient has CKD4.  Consider getting repeat non con CT head        Elevated troponin  Assessment & Plan  - troponin peaked at  458 overnight  - no active chest pain   - EKG shows no ischemia  - Cards consult, appreciate recs  - ECHO performed: EF 54% (see details of report 9/15/23)    Pneumocephalus  Assessment & Plan  See above    SAH (subarachnoid hemorrhage) ([address])  Assessment & Plan  See above    SDH (subdural hematoma) ([facility])  Assessment & Plan  See above           TRAUMA TERTIARY SURVEY NOTE    VTE Prophylaxis:Reason for no pharmacologic prophylaxis SDH, SAH     Disposition: pending    Code status:  Level 1 - Full Code    Consultants: IP CONSULT TO ENT  IP CONSULT TO TRAUMA SURGERY  IP CONSULT TO NEUROSURGERY  IP CONSULT TO ENT  IP CONSULT TO GERONTOLOGY  IP CONSULT TO CARDIOLOGY  IP CONSULT TO NEPHROLOGY  IP CONSULT TO ENT    Subjective     Mechanism of Injury:Fall     Chief Complaint: tired    HPI/Last 24 hour events: HPI:  Kristan Loera is a 80 y.o. male who presents after a fall. Patient was moving something into his truck and fell backwards, hitting his head on the ground. Patient denies loss of consciousness or vomiting, but has been nauseous since the head strike. Denies any other injuries    Overnight patient slept well. No focal deficits. No complaints of headache, chest pain, dyspnea, abdominal pain. Objective   Vitals:   Temp:  [96.7 °F (35.9 °C)-99.5 °F (37.5 °C)] 99.5 °F (37.5 °C)  HR:  [63-67] 67  Resp:  [16-24] 18  BP: (151-192)/(70-91) 151/70    I/O       09/14 0701  09/15 0700 09/15 0701  09/16 0700 09/16 0701  09/17 0700    IV Piggyback  500     Total Intake  500     Urine  1200     Total Output  1200     Net  -700                   Physical Exam:   GENERAL APPEARANCE: NAD  NEURO: Patient is speaking clearly in complete sentences. Patient is answering appropriately and able follow commands. Patient is moving all four extremities spontaneously. No facial droop. Tongue midline. HEENT: PERRL, Moist mucous membranes, external ears normal, clotted blood R EAC.,nose normal  Neck: No cervical adenopathy  CV: RRR. No murmurs, rubs, gallops  LUNGS: Clear to auscultation: No wheezes, stridor, rhonchi, rales  GI: Abdomen non-distended. Soft. Non-tender and without rebound or guarding   : Deferred at this time  MSK: No deformity.    Skin: Warm and dry  Capillary refill: <2 seconds      Invasive Devices     Peripheral Intravenous Line  Duration           Peripheral IV 09/15/23 Left;Proximal;Ventral (anterior) Forearm <1 day          Drain  Duration           Urostomy -- days    Urostomy Ureterostomy right RLQ <1 day                   1. Before the illness or injury that brought you to the Emergency, did you need someone to help you on a regular basis? 1=Yes   2. Since the illness or injury that brought you to the Emergency, have you needed more help than usual to take care of yourself? 1=Yes   3. Have you been hospitalized for one or more nights during the past 6 months (excluding a stay in the Emergency Department)? 1=Yes   4. In general, do you see well? 0=Yes   5. In general, do you have serious problems with your memory? 0=No   6. Do you take more than three different medications everyday? 1=Yes   TOTAL   4     Did you order a geriatric consult if the score was 2 or greater?: yes         Lab Results: Results: I have personally reviewed all pertinent laboratory/tests results    Imaging Results: I have personally reviewed pertinent reports.     Chest Xray(s): N/A   FAST exam(s): see imaging   CT Scan(s): see imaging   Additional Xray(s): see imaging

## 2023-09-16 NOTE — ASSESSMENT & PLAN NOTE
Temporal bone fracture noted on CT 9/15, with associated subdural hematoma, subarachnoid hemorrhage. Noted continued bleeding from right ear s/p administration of afrin  - Neuro exam: stable  - Continue neuro checks  - Appreciate neurosurgery and ENT recommendations  - Unasyn for treatment of open fracture  - Complete 7 day course of Keppra for seizure prophylaxis  - Chemical DVT prophylaxis: Not cleared for chemical prophylaxis by neurosurgery at this time. Continue SCDs bilaterally. - Hold all anticoagulants and anti platelet medications until cleared by neurosurgery to resume  - PT/OT and cognitive evaluation  - Consider CTV/CTA per neurosurgery recommendation given patient has CKD4.  Consider getting repeat non con CT head

## 2023-09-16 NOTE — DISCHARGE INSTR - AVS FIRST PAGE
Neurosurgery discharge instructions following traumatic head bleed:     Do not take any blood thinning medications (ie. No Advil. No motrin. No ibuprofen. No Aleve. No Aspirin. No fishoil. No heparin. No antiplatelet / no anticoagulation medication). Refrain from activity that increases chance of trauma to head or falls. Recommend you take fall precaution. No strenuous activity or sports. Return to hospital Emergency Room if you experience worsening / new headache, nausea/vomiting, speech/vision change, seizure, confusion / mental status change, weakness, or other neurological changes. Follow-up as scheduled with a repeat CT head without contrast to be completed 2-3 days prior to visit. Prescription has been entered electronically. Please call [phone] to schedule.

## 2023-09-16 NOTE — PLAN OF CARE
Problem: MOBILITY - ADULT  Goal: Maintain or return to baseline ADL function  Description: INTERVENTIONS:  -  Assess patient's ability to carry out ADLs; assess patient's baseline for ADL function and identify physical deficits which impact ability to perform ADLs (bathing, care of mouth/teeth, toileting, grooming, dressing, etc.)  - Assess/evaluate cause of self-care deficits   - Assess range of motion  - Assess patient's mobility; develop plan if impaired  - Assess patient's need for assistive devices and provide as appropriate  - Encourage maximum independence but intervene and supervise when necessary  - Involve family in performance of ADLs  - Assess for home care needs following discharge   - Consider OT consult to assist with ADL evaluation and planning for discharge  - Provide patient education as appropriate  Outcome: Progressing  Goal: Maintains/Returns to pre admission functional level  Description: INTERVENTIONS:  - Perform BMAT or MOVE assessment daily.   - Set and communicate daily mobility goal to care team and patient/family/caregiver.    - Collaborate with rehabilitation services on mobility goals if consulted  - Record patient progress and toleration of activity level   Outcome: Progressing     Problem: PAIN - ADULT  Goal: Verbalizes/displays adequate comfort level or baseline comfort level  Description: Interventions:  - Encourage patient to monitor pain and request assistance  - Assess pain using appropriate pain scale  - Administer analgesics based on type and severity of pain and evaluate response  - Implement non-pharmacological measures as appropriate and evaluate response  - Consider cultural and social influences on pain and pain management  - Notify physician/advanced practitioner if interventions unsuccessful or patient reports new pain  Outcome: Progressing     Problem: INFECTION - ADULT  Goal: Absence or prevention of progression during hospitalization  Description: INTERVENTIONS:  - Assess and monitor for signs and symptoms of infection  - Monitor lab/diagnostic results  - Monitor all insertion sites, i.e. indwelling lines, tubes, and drains  - Monitor endotracheal if appropriate and nasal secretions for changes in amount and color  - [place] appropriate cooling/warming therapies per order  - Administer medications as ordered  - Instruct and encourage patient and family to use good hand hygiene technique  - Identify and instruct in appropriate isolation precautions for identified infection/condition  Outcome: Progressing     Problem: SAFETY ADULT  Goal: Maintain or return to baseline ADL function  Description: INTERVENTIONS:  -  Assess patient's ability to carry out ADLs; assess patient's baseline for ADL function and identify physical deficits which impact ability to perform ADLs (bathing, care of mouth/teeth, toileting, grooming, dressing, etc.)  - Assess/evaluate cause of self-care deficits   - Assess range of motion  - Assess patient's mobility; develop plan if impaired  - Assess patient's need for assistive devices and provide as appropriate  - Encourage maximum independence but intervene and supervise when necessary  - Involve family in performance of ADLs  - Assess for home care needs following discharge   - Consider OT consult to assist with ADL evaluation and planning for discharge  - Provide patient education as appropriate  Outcome: Progressing  Goal: Maintains/Returns to pre admission functional level  Description: INTERVENTIONS:  - Perform BMAT or MOVE assessment daily.   - Set and communicate daily mobility goal to care team and patient/family/caregiver.    - Collaborate with rehabilitation services on mobility goals if consulted  - Record patient progress and toleration of activity level   Outcome: Progressing  Goal: Patient will remain free of falls  Description: INTERVENTIONS:  - Educate patient/family on patient safety including physical limitations  - Instruct patient to call for assistance with activity   - Consult OT/PT to assist with strengthening/mobility   - Keep Call bell within reach  - Keep bed low and locked with side rails adjusted as appropriate  - Keep care items and personal belongings within reach  - Initiate and maintain comfort rounds  - Make Fall Risk Sign visible to staff  - Apply yellow socks and bracelet for high fall risk patients  - Consider moving patient to room near nurses station  Outcome: Progressing     Problem: DISCHARGE PLANNING  Goal: Discharge to home or other facility with appropriate resources  Description: INTERVENTIONS:  - Identify barriers to discharge w/patient and caregiver  - Arrange for needed discharge resources and transportation as appropriate  - Identify discharge learning needs (meds, wound care, etc.)  - Arrange for interpretive services to assist at discharge as needed  - Refer to Case Management Department for coordinating discharge planning if the patient needs post-hospital services based on physician/advanced practitioner order or complex needs related to functional status, cognitive ability, or social support system  Outcome: Progressing     Problem: Knowledge Deficit  Goal: Patient/family/caregiver demonstrates understanding of disease process, treatment plan, medications, and discharge instructions  Description: Complete learning assessment and assess knowledge base.   Interventions:  - Provide teaching at level of understanding  - Provide teaching via preferred learning methods  Outcome: Progressing

## 2023-09-16 NOTE — ASSESSMENT & PLAN NOTE
Temporal bone fracture noted on CT 9/15, with associated subdural hematoma, subarachnoid hemorrhage. Noted continued bleeding from right ear s/p administration of afrin  - Neuro exam: stable  - Continue neuro checks  - Appreciate neurosurgery and ENT recommendations  - Unasyn for treatment of open fracture  - Complete 7 day course of Keppra for seizure prophylaxis  - Chemical DVT prophylaxis: Not cleared for chemical prophylaxis by neurosurgery at this time. Continue SCDs bilaterally. - Hold all anticoagulants and anti platelet medications until cleared by neurosurgery to resume  - PT/OT and cognitive evaluation  - Consider CTV/CTA per neurosurgery recommendation given patient has CKD4.  Consider getting repeat non con CT head, nephro recs appreciated, following creatinine, plan for non con CT head until nephro clearance for CTV

## 2023-09-16 NOTE — CONSULTS
Consultation - Cardiology   Walker Clemente 80 y.o. male MRN: [id number]  Unit/Bed#: [facility] 603-01 Encounter: [id number]    Assessment/Plan     Assessment:  1. Elevated troponin  - Troponin trended from 58->205->285->432->458   - EKG without ischemic changes  - Patient with no chest pain, shortness of breath, diaphoresis  - No recent ischemic work-up  - Most recent echo from 12/2/2022 demonstrated EF 45%, global longitudinal strain reduced at -12%, mild global hypokinesis with akinesis of basal inferior and mid inferior segments. - Bedside echo performed by me demonstrated EF of about 55% with hypokinesis of basal inferior mid inferior segments  - Likely type II MI in the setting of demand ischemia from trauma  - Per neurosurgery patient is not cleared for any anticoagulation at this point due to subarachnoid and subdural hematomas  - Patient is continued on home atorvastatin 40 mg daily  - Patient's home metoprolol 25 mg twice daily and losartan 25 mg daily are being held    2. Subdural hematoma  - CT demonstrated acute multicompartment hemorrhage, subarachnoid hemorrhage hematoma and subdural as well as right temporal bone fracture and small pneumocephalus  - After fall backwards with head strike to asphalt, no loss consciousness  - Per neurosurgery frequent neurochecks, stat head CT with any decline in GCS, normotensive BP goals, no acute neurosurgical intervention  - Repeat head CT in the morning patient is on Keppra for seizure prophylaxis  - Recommend to hold chemical prophylaxis or anticoagulation per neurosurgery  - Management per trauma neurosurgery    3. CAD status post CABG  - CABG in 2014 however no report in the record  - Remains on aspirin, Plavix, metoprolol, atorvastatin, losartan    4. Hypertension  - Patient has been hypertensive since arrival from 164/72 to 192/89. Currently antihypertensives are being held however per neurosurgery patient blood pressure goal is normotension    5.  CKD  - Creatinine baseline of 2.9-3.2, most recently was 2.5  - Creatinine 2.28 on arrival    6. Diabetes mellitus  - On Alecia Coyle as an outpatient  - Management per primary team    Plan:  - elevate troponin likely reflects type II MI in the setting of trauma  - Although ACS is unlikely, even if this were to represent ACS patient has contraindication to intervention as he is not a candidate for anticoagulation or antiplatelets at this time. For now we will medically manage with restarting his beta-blocker  - Patient is chest pain free at the time however if patient develops chest pain would recommend as needed EKG and nitroglycerin  - Monitor on telemetry  - would recommend resuming losartan 25 mg daily, metoprolol 25 mg twice daily     History of Present Illness   Physician Requesting Consult: Gwendolyn Rivera MD  Reason for Consult / Principal Problem: Elevated troponin  HPI: Nora Cook is a 80y.o. 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 Patient states he was moving a large wooden plank into his truck when he fell backwards and struck his head on the asphalt. He denies any loss of consciousness. Patient's wife and neighbor witnessed the fall and EMS was called. Patient was nauseous with reported vomiting in route. CT imaging on arrival demonstrated multicompartment hemorrhage with right temporal bone fracture as well as small amount of pneumocephalus. Patient was also bleeding from his right ear. On my exam patient had mild headache but was improving with Tylenol. No nausea however did he became dizzy whenever moving in bed. Patient follows with Dr. Sangeeta Singh as an outpatient.   Most recent echo demonstrated EF 45% with global hypokinesis and akinesis of basal inferior and mid inferior wall. Patient denies any chest pain. No recent ischemic work-up. He denies palpitations, nausea, vomiting, diarrhea, syncope, presyncope. Inpatient consult to Cardiology  Consult performed by: Melony Crowe DO  Consult ordered by: Korina Daniel DO          Review of Systems   Constitutional: Negative for chills and fever. HENT: Positive for ear discharge. Negative for ear pain and sore throat. Blood in right ear, decreased hearing in right ear    Eyes: Negative for pain and visual disturbance. Respiratory: Negative for cough and shortness of breath. Cardiovascular: Negative for chest pain and palpitations. Gastrointestinal: Negative for abdominal pain and vomiting. Genitourinary: Negative for dysuria and hematuria. Musculoskeletal: Negative for arthralgias and back pain. Skin: Negative for color change and rash. Neurological: Positive for dizziness and headaches. Negative for seizures and syncope. All other systems reviewed and are negative.       Historical Information   Past Medical History:   Diagnosis Date   • Acute kidney injury ([address])    • Anemia Jan. 2022   • Arthritis     Hands   • Wright esophagus    • BPH with obstruction/lower urinary tract symptoms    • CAD (coronary artery disease)     Last assessed 09/16/15   • Cancer ([address])     bladder   • Cataract, acquired     Last assessed 10/10/17   • Chronic kidney disease    • Coronary artery disease    • Diabetes mellitus ([address])     NIDDM   • Diabetic neuropathy (HCC)     Feet   • Enlarged prostate with lower urinary tract symptoms (LUTS)     Last assessed 10/10/17   • Erectile dysfunction    • GERD (gastroesophageal reflux disease)     Last assessed 10/10/17   • Hemoptysis     Last assessed 03/14/16   • Hypercholesterolemia     Last assessed 10/10/17   • Hypertension     Last assessed 10/10/17   • Kidney stone    • Macular degeneration     Right eye is particularly affected-peripheral vision intact   • Myocardial [facility] 1998   • OAB (overactive bladder)    • Testicular hypofunction    • Testicular hypogonadism     Last assessed 10/10/17   • Tinnitus    • Umbilical hernia     Last assessed 06/18/14   • Urge incontinence of urine    • Wears glasses      Past Surgical History:   Procedure Laterality Date   • BLADDER SURGERY     • COLONOSCOPY     • CORONARY ARTERY BYPASS GRAFT  07/16/2014    ABG x 4 LIMA to LAD,SVG to diagnoal 2 SVG to OM-1, SVG to PDA, resection of partial plerual mass   • CT GUIDED PERC DRAINAGE CATHETER PLACEMENT  02/19/2021   • CYSTOSCOPY  2013   • CYSTOSCOPY  02/08/2022    [place]   • ESOPHAGOGASTRODUODENOSCOPY     • FL RETROGRADE PYELOGRAM  12/20/2018   • FL RETROGRADE PYELOGRAM  12/05/2019   • INGUINAL HERNIA REPAIR Bilateral 2015   • IR DRAINAGE TUBE CHECK AND/OR REMOVAL  03/05/2021   • PHOTODYNAMIC THERAPY      For Barretts esophagus   • SC COLONOSCOPY FLX DX W/COLLJ SPEC WHEN PFRMD N/A 04/25/2016    Procedure: COLONOSCOPY;  Surgeon: Zoë Melo MD;  Location:  GI LAB; Service: Gastroenterology   • SC CYSTO W/REMOVAL OF LESIONS SMALL N/A 12/05/2019    Procedure: CYSTO W/TURBT AND TRANSURETHRAL PROSTATE BIOPSY AND OPENING OF BLADDER NECK CONTRACTURE, B/L Retrograde pyelogram;  Surgeon: Nina Grimes MD;  Location: [facility];  Service: Urology   • SC CYSTOURETHROSCOPY W/DEST &/RMVL MED BLADDER TY N/A 04/16/2020    Procedure: CYSTOSCOPY, TRANSURETHRAL RESECTION OF BLADDER TUMOR (TURBT);   Surgeon: Nina Grimes MD;  Location: [facility];  Service: Urology   • SC CYSTOURETHROSCOPY WITH BIOPSY N/A 09/10/2020    Procedure: CYSTOSCOPY, COLLECTION OF LEFT KIDNEY CYTOLOGY, BILATERAL RETROGRADE PYELOGRAM, BLADDER WALL BIOPSIES  AND FULGERAION, RANDOM BLADDER TUMOR BIOPSIES;  Surgeon: Nina Grimes MD;  Location: [address] [place] [address] MAIN OR;  Service: Urology   • SC CYSTOURETHROSCOPY WITH BIOPSY N/A 04/05/2022    Procedure: urethroscopy , biopsy of urethra with fulgeration;  Surgeon: Shannon Houston Varghese Osorio MD;  Location:  MAIN OR;  Service: Urology   • PROSTATE SURGERY     • TONSILLECTOMY     • TRANSURETHRAL RESECTION OF PROSTATE N/A 2018    Procedure: CYSTO, PHOTO SELECTIVE VAPORIZATION OF PROSTATE, B/L RETROGRADE PYELOGRAM, TURBT;  Surgeon: Tee March MD;  Location: [facility];  Service: Urology   • UMBILICAL HERNIA REPAIR     • UPPER GASTROINTESTINAL ENDOSCOPY       Social History     Substance and Sexual Activity   Alcohol Use Not Currently   • Alcohol/week: 2.0 standard drinks of alcohol   • Types: 1 Glasses of wine, 1 Cans of beer per week    Comment: Non since 7/15/2020     Social History     Substance and Sexual Activity   Drug Use Never     E-Cigarette/Vaping   • E-Cigarette Use Never User      E-Cigarette/Vaping Substances   • Nicotine No    • THC No    • CBD No    • Flavoring No    • Other No    • Unknown No      Social History     Tobacco Use   Smoking Status Former   • Packs/day: 1.00   • Years: 10.00   • Total pack years: 10.00   • Types: Cigarettes   • Quit date: 1972   • Years since quittin.7   Smokeless Tobacco Never   Tobacco Comments    Quit at age 28     Family History:   Family History   Problem Relation Age of Onset   • Cancer Mother         Topical oral abrasian caused cancer   • Other Mother         Digestive System Complications   • Diabetes Father    • Heart disease Father    • Hypertension Father    • Coronary artery disease Father    • Hyperlipidemia Father        Meds/Allergies   all current active meds have been reviewed, current meds:   Current Facility-Administered Medications   Medication Dose Route Frequency   • acetaminophen (TYLENOL) tablet 650 mg  650 mg Oral Q6H PRN   • ampicillin-sulbactam (UNASYN) 3 g in sodium chloride 0.9 % 100 mL IVPB  3 g Intravenous Q12H   • atorvastatin (LIPITOR) tablet 40 mg  40 mg Oral HS   • citalopram (CeleXA) tablet 20 mg  20 mg Oral Daily   • HYDROmorphone HCl (DILAUDID) injection 0.2 mg  0.2 mg Intravenous Q6H PRN   • insulin lispro (HumaLOG) 100 units/mL subcutaneous injection 1-6 Units  1-6 Units Subcutaneous TID AC   • levETIRAcetam (KEPPRA) 500 mg in sodium chloride 0.9 % 100 mL IVPB  500 mg Intravenous Q12H MARIA LUZ   • ofloxacin (OCUFLOX) 0.3 % ophthalmic solution 5 drop  5 drop Right Eye 4x Daily   • ondansetron (ZOFRAN) injection 4 mg  4 mg Intravenous Q6H PRN   • oxyCODONE (ROXICODONE) IR tablet 5 mg  5 mg Oral Q8H PRN   • oxyCODONE (ROXICODONE) split tablet 2.5 mg  2.5 mg Oral Q8H PRN   • pantoprazole (PROTONIX) EC tablet 40 mg  40 mg Oral Early Morning    and PTA meds:   Prior to Admission Medications   Prescriptions Last Dose Informant Patient Reported? Taking?    Blood Glucose Monitoring Suppl (OneTouch Verio Flex System) w/Device KIT  Self No No   Sig: Use to check blood sugars daily   Cholecalciferol (VITAMIN D) 50 MCG (2000 UT) tablet  Self Yes No   Sig: Take 2,000 Units by mouth daily   Farxiga 5 MG TABS  Self No No   Sig: TAKE ONE TABLET BY MOUTH EVERY DAY   Lancets (OneTouch Delica Plus [id number]) MISC  Self No No   Sig: TEST BLOOD GLUCOSE ONCE DAILY   Multiple Vitamins-Minerals (OCUVITE-LUTEIN PO)  Self Yes No   Sig: Take 1 tablet by mouth 2 (two) times a day Pt is taking preservision    Omega-3 Fatty Acids (Fish Oil) 1200 MG CPDR  Self Yes No   Sig: Take by mouth in the morning   Patient not taking: Reported on 8/8/2023   OneTouch Verio test strip  Self No No   Sig: TEST ONCE A DAY   acetaminophen (TYLENOL) 325 mg tablet  Self Yes No   Sig: Take 650 mg by mouth every 6 (six) hours as needed for mild pain   atorvastatin (LIPITOR) 40 mg tablet  Self No No   Sig: TAKE ONE TABLET BY MOUTH AT BEDTIME   calcitriol (ROCALTROL) 0.25 mcg capsule  Self No No   Sig: Take 1 tablet 2 times a week (Monday, Friday)   Patient taking differently: Take 1 tablet 2 times a week (Tuesday, Friday)   citalopram (CeleXA) 20 mg tablet  Self No No   Sig: TAKE ONE TABLET BY MOUTH EVERY DAY   ferrous sulfate 324 (65 Fe) mg  Self No No Sig: Take 1 tablet (324 mg total) by mouth daily   losartan (COZAAR) 25 mg tablet  Self Yes No   Sig: Take 25 mg by mouth daily   magnesium oxide (MAG-OX) 400 mg  Self No No   Sig: Take 1 tablet (400 mg total) by mouth in the morning. metoprolol tartrate (LOPRESSOR) 25 mg tablet  Self No No   Sig: Take 1 tablet (25 mg total) by mouth every 12 (twelve) hours   omeprazole (PriLOSEC) 40 MG capsule  Self No No   Sig: TAKE ONE CAPSULE BY MOUTH EVERY MORNING   ondansetron (ZOFRAN) 4 mg tablet  Self No No   Sig: Take 1 tablet (4 mg total) by mouth every 8 (eight) hours as needed for nausea or vomiting   Patient not taking: Reported on 9/12/2023   sodium bicarbonate 650 mg tablet  Self No No   Sig: Take 2 tablets (1,300 mg total) by mouth 2 (two) times a day   torsemide (DEMADEX) 20 mg tablet  Self No No   Sig: Take 1 tablet (20 mg total) by mouth every other day   traMADol (ULTRAM) 50 mg tablet  Self No No   Sig: TAKE ONE TABLET BY MOUTH EVERY 6 HOURS AS NEEDED FOR MODERATE PAIN      Facility-Administered Medications: None     Allergies   Allergen Reactions   • Fentanyl Hallucinations   • Morphine And Related Other (See Comments)     While having an MI patient received morphine and had adverse reaction but doesn't know what happened. Objective   Vitals: Blood pressure 164/72, pulse 67, temperature 99.2 °F (37.3 °C), resp. rate 16, height 5' 7" (1.702 m), SpO2 97 %.   Orthostatic Blood Pressures    Flowsheet Row Most Recent Value   Blood Pressure 164/72 filed at 09/15/2023 2348   Patient Position - Orthostatic VS Lying filed at 09/15/2023 1400            Intake/Output Summary (Last 24 hours) at 9/16/2023 0000  Last data filed at 9/15/2023 2100  Gross per 24 hour   Intake 500 ml   Output 1200 ml   Net -700 ml       Invasive Devices     Peripheral Intravenous Line  Duration           Peripheral IV 09/15/23 Left;Proximal;Ventral (anterior) Forearm <1 day          Drain  Duration           Urostomy -- days    Urostomy Ureterostomy right RLQ <1 day                Physical Exam  Vitals and nursing note reviewed. Constitutional:       General: He is not in acute distress. Appearance: He is well-developed. HENT:      Head: Normocephalic and atraumatic. Comments: Blood in right external auditory canal  Eyes:      Conjunctiva/sclera: Conjunctivae normal.   Cardiovascular:      Rate and Rhythm: Normal rate and regular rhythm. Heart sounds: No murmur heard. No friction rub. No gallop. Pulmonary:      Effort: Pulmonary effort is normal. No respiratory distress. Breath sounds: Normal breath sounds. Abdominal:      Palpations: Abdomen is soft. Tenderness: There is no abdominal tenderness. Musculoskeletal:         General: No swelling. Cervical back: Neck supple. Skin:     General: Skin is warm and dry. Capillary Refill: Capillary refill takes less than 2 seconds. Neurological:      Mental Status: He is alert. Psychiatric:         Mood and Affect: Mood normal.         Lab Results:   I have personally reviewed pertinent lab results.     CBC with diff:   Results from last 7 days   Lab Units 09/15/23  1306   WBC Thousand/uL 15.04*   RBC Million/uL 3.46*   HEMOGLOBIN g/dL 10.3*   HEMATOCRIT % 31.4*   MCV fL 91   MCH pg 29.8   MCHC g/dL 32.8   RDW % 16.7*   MPV fL 11.4   PLATELETS Thousands/uL 233     CMP:   Results from last 7 days   Lab Units 09/15/23  1306   SODIUM mmol/L 134*   CHLORIDE mmol/L 103   CO2 mmol/L 16*   BUN mg/dL 57*   CREATININE mg/dL 2.28*   CALCIUM mg/dL 8.5   AST U/L 17   ALT U/L 15   ALK PHOS U/L 97   EGFR ml/min/1.73sq m 25     HS Troponin:   0   Lab Value Date/Time    HSTNI0 432 (H) 09/15/2023 2128    HSTNI2 458 (H) 09/16/2023 0002    HSTNI4 285 (H) 09/15/2023 1821     BNP:   Results from last 7 days   Lab Units 09/15/23  1306   POTASSIUM mmol/L 3.8   CHLORIDE mmol/L 103   CO2 mmol/L 16*   BUN mg/dL 57*   CREATININE mg/dL 2.28*   CALCIUM mg/dL 8.5   EGFR ml/min/1.73sq m 25     Coags:   Results from last 7 days   Lab Units 09/15/23  2128   PTT seconds 24   INR  1.10     Imaging: I have personally reviewed pertinent reports. and I have personally reviewed pertinent films in PACS  EKG: EKG on arrival demonstrated atrial flutter with variable AV block. PACs, right bundle branch block, left axis deviation. Unchanged from previous.

## 2023-09-17 ENCOUNTER — HOME HEALTH ADMISSION (OUTPATIENT)
Dept: HOME HEALTH SERVICES | Facility: HOME HEALTHCARE | Age: 83
End: 2023-09-17

## 2023-09-17 ENCOUNTER — APPOINTMENT (INPATIENT)
Dept: RADIOLOGY | Facility: HOSPITAL | Age: 83
DRG: 070 | End: 2023-09-17
Payer: MEDICARE

## 2023-09-17 LAB
ANION GAP SERPL CALCULATED.3IONS-SCNC: 9 MMOL/L
BUN SERPL-MCNC: 44 MG/DL (ref 5–25)
CALCIUM SERPL-MCNC: 8.1 MG/DL (ref 8.4–10.2)
CHLORIDE SERPL-SCNC: 107 MMOL/L (ref 96–108)
CO2 SERPL-SCNC: 20 MMOL/L (ref 21–32)
CREAT SERPL-MCNC: 2.45 MG/DL (ref 0.6–1.3)
GFR SERPL CREATININE-BSD FRML MDRD: 23 ML/MIN/1.73SQ M
GLUCOSE SERPL-MCNC: 121 MG/DL (ref 65–140)
GLUCOSE SERPL-MCNC: 140 MG/DL (ref 65–140)
GLUCOSE SERPL-MCNC: 151 MG/DL (ref 65–140)
GLUCOSE SERPL-MCNC: 154 MG/DL (ref 65–140)
GLUCOSE SERPL-MCNC: 172 MG/DL (ref 65–140)
POTASSIUM SERPL-SCNC: 3.9 MMOL/L (ref 3.5–5.3)
SODIUM SERPL-SCNC: 136 MMOL/L (ref 135–147)

## 2023-09-17 PROCEDURE — 80048 BASIC METABOLIC PNL TOTAL CA: CPT | Performed by: INTERNAL MEDICINE

## 2023-09-17 PROCEDURE — 99232 SBSQ HOSP IP/OBS MODERATE 35: CPT | Performed by: NEUROLOGICAL SURGERY

## 2023-09-17 PROCEDURE — G1004 CDSM NDSC: HCPCS

## 2023-09-17 PROCEDURE — 82948 REAGENT STRIP/BLOOD GLUCOSE: CPT

## 2023-09-17 PROCEDURE — 99232 SBSQ HOSP IP/OBS MODERATE 35: CPT | Performed by: SURGERY

## 2023-09-17 PROCEDURE — 99232 SBSQ HOSP IP/OBS MODERATE 35: CPT | Performed by: INTERNAL MEDICINE

## 2023-09-17 PROCEDURE — 97167 OT EVAL HIGH COMPLEX 60 MIN: CPT

## 2023-09-17 PROCEDURE — 97163 PT EVAL HIGH COMPLEX 45 MIN: CPT

## 2023-09-17 PROCEDURE — 70450 CT HEAD/BRAIN W/O DYE: CPT

## 2023-09-17 RX ORDER — DOCUSATE SODIUM 100 MG/1
100 CAPSULE, LIQUID FILLED ORAL 2 TIMES DAILY
Status: DISCONTINUED | OUTPATIENT
Start: 2023-09-17 | End: 2023-09-19

## 2023-09-17 RX ORDER — ENOXAPARIN SODIUM 100 MG/ML
30 INJECTION SUBCUTANEOUS
Status: DISCONTINUED | OUTPATIENT
Start: 2023-09-17 | End: 2023-09-19 | Stop reason: HOSPADM

## 2023-09-17 RX ADMIN — CITALOPRAM HYDROBROMIDE 20 MG: 20 TABLET ORAL at 08:57

## 2023-09-17 RX ADMIN — ACETAMINOPHEN 650 MG: 325 TABLET, FILM COATED ORAL at 21:27

## 2023-09-17 RX ADMIN — LEVETIRACETAM 500 MG: 100 INJECTION, SOLUTION INTRAVENOUS at 08:59

## 2023-09-17 RX ADMIN — SODIUM CHLORIDE 3 G: 9 INJECTION, SOLUTION INTRAVENOUS at 08:59

## 2023-09-17 RX ADMIN — OFLOXACIN 5 DROP: 3 SOLUTION OPHTHALMIC at 17:24

## 2023-09-17 RX ADMIN — PANTOPRAZOLE SODIUM 40 MG: 40 TABLET, DELAYED RELEASE ORAL at 05:06

## 2023-09-17 RX ADMIN — ENOXAPARIN SODIUM 30 MG: 30 INJECTION SUBCUTANEOUS at 13:20

## 2023-09-17 RX ADMIN — ATORVASTATIN CALCIUM 40 MG: 40 TABLET, FILM COATED ORAL at 21:20

## 2023-09-17 RX ADMIN — ACETAMINOPHEN 650 MG: 325 TABLET, FILM COATED ORAL at 08:57

## 2023-09-17 RX ADMIN — DOCUSATE SODIUM 100 MG: 100 CAPSULE, LIQUID FILLED ORAL at 08:57

## 2023-09-17 RX ADMIN — OFLOXACIN 5 DROP: 3 SOLUTION OPHTHALMIC at 21:20

## 2023-09-17 RX ADMIN — INSULIN LISPRO 1 UNITS: 100 INJECTION, SOLUTION INTRAVENOUS; SUBCUTANEOUS at 08:59

## 2023-09-17 RX ADMIN — OFLOXACIN 5 DROP: 3 SOLUTION OPHTHALMIC at 08:59

## 2023-09-17 RX ADMIN — OFLOXACIN 5 DROP: 3 SOLUTION OPHTHALMIC at 13:20

## 2023-09-17 RX ADMIN — LEVETIRACETAM 500 MG: 100 INJECTION, SOLUTION INTRAVENOUS at 21:20

## 2023-09-17 RX ADMIN — DOCUSATE SODIUM 100 MG: 100 CAPSULE, LIQUID FILLED ORAL at 17:24

## 2023-09-17 RX ADMIN — INSULIN LISPRO 1 UNITS: 100 INJECTION, SOLUTION INTRAVENOUS; SUBCUTANEOUS at 13:20

## 2023-09-17 RX ADMIN — ONDANSETRON 4 MG: 2 INJECTION INTRAMUSCULAR; INTRAVENOUS at 06:26

## 2023-09-17 RX ADMIN — SODIUM CHLORIDE 3 G: 9 INJECTION, SOLUTION INTRAVENOUS at 21:20

## 2023-09-17 NOTE — CASE MANAGEMENT
Case Management Assessment & Discharge Planning Note    Patient name Kristan Loera  Location [facility] 603/[facility] 543-36 MRN [id number]  : 1940 Date 2023       Current Admission Date: 9/15/2023  Current Admission Diagnosis:Open fracture of temporal bone [facility])   Patient Active Problem List    Diagnosis Date Noted   • Open fracture of temporal bone ([address]) 09/15/2023   • SDH (subdural hematoma) ([address]) 09/15/2023   • Fall 09/15/2023   • SAH (subarachnoid hemorrhage) ([address]) 09/15/2023   • Pneumocephalus 09/15/2023   • Hyponatremia 2023   • Pelvic lymphadenopathy 2023   • Chronic diastolic CHF (congestive heart failure) ([address]) 2023   • Rib pain on left side 2023   • Left inguinal pain 2023   • Chemotherapy-induced thrombocytopenia 02/15/2023   • Shortness of breath 2022   • Anemia due to stage 4 chronic kidney disease ([address]) 2022   • Stage 4 chronic kidney disease ([address]) 08/10/2022   • Secondary hyperparathyroidism of renal origin ([address]) 2022   • Urethral tumor 2022   • Functional diarrhea 2022   • Platelets decreased ([address]) 2022   • Visual hallucinations 2021   • Chronic kidney disease-mineral and bone disorder 10/24/2021   • Benign hypertension with chronic kidney disease, stage IV ([address]) 2021   • Persistent proteinuria 2021   • Anemia 2021   • RBBB 2021   • Hypomagnesemia    • Metabolic acidosis    • Severe protein-calorie malnutrition ([address]) 2021   • Right sided Pelvic abscess in male [facility]) 2021   • Ambulatory dysfunction 2021   • Frequent falls 2021   • Anxiety and depression 2020   • Overweight 2020   • Fungal dermatitis 2020   • Forearm mass, left 2020   • Elevated troponin 2020   • Liver function study, abnormal 2020   • Malignant neoplasm of urinary bladder neck ([address]) 2020   • Positive urinary cytology 2019   • Coronary artery disease involving native coronary artery of native heart without angina pectoris 09/09/2019   • Ischemic cardiomyopathy 09/09/2019   • History of bladder cancer 07/30/2019   • Closed fracture of upper end of right fibula 03/11/2019   • Malignant neoplasm of overlapping sites of bladder ([address]) 01/10/2019   • 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/2019   • Type 2 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 ([address]) 12/05/2018   • Bladder tumor 11/01/2018   • Overactive bladder 10/10/2018   • 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   • Backache 10/21/2013   • Arteriosclerotic cardiovascular disease 10/11/2012   • DMII (diabetes mellitus, type 2) ([address]) 10/11/2012   • Hyperlipidemia 10/11/2012      LOS (days): 2  Geometric Mean LOS (GMLOS) (days):   Days to GMLOS:     OBJECTIVE:    Risk of Unplanned Readmission Score: 22.42         Current admission status: Inpatient       Preferred Pharmacy:   [address] [place] [address].   Rosanna VENEGAS [address]  Phone: [phone] Fax: [place] - [address]  Phone: [phone] Fax: [phone]    Primary Care Provider: Priya Eugene DO    Primary Insurance: MEDICARE  Secondary Insurance: BLUE CROSS    ASSESSMENT:  [address] [facility], [address] [place] [address] Representative - Spouse   Primary Phone: [phone] (Mobile)               Advance Directives  Does patient have a [address]?: Yes  Does patient have Advance Directives?: Yes  Advance Directives: Living will, Power of  for health care  Primary Contact: Wife, Lelo Velázquez    Patient Information  Admitted from[de-identified] Home  Mental Status: Alert  During Assessment patient was accompanied by: Not accompanied during assessment  Assessment information provided by[de-identified] Patient  Primary Caregiver: Self  Support Systems: Spouse/significant other, [address] of Residence: [address] [facility] do you live in?: [address] entry access options.  Select all that apply.: Stairs  Number of steps to enter home.: 3  Type of Current Residence: Ranch  In the last 12 months, was there a time when you were not able to pay the mortgage or rent on time?: No  In the last 12 months, how many places have you lived?: 1  In the last 12 months, was there a time when you did not have a steady place to sleep or slept in a shelter (including now)?: No  Homeless/housing insecurity resource given?: N/A  Living Arrangements: Lives w/ Spouse/significant other  Is patient a ?: Yes  Is patient active with [facility])?: No    Activities of Daily Living Prior to Admission  Functional Status: Independent  Completes ADLs independently?: Yes  Ambulates independently?: Yes  Does patient use assisted devices?: Yes  Assisted Devices (DME) used: Bhupendra Rivera  Does patient have a history of Outpatient Therapy (PT/OT)?: No  Does the patient have a history of Short-Term Rehab?: Yes ([facility])  Does patient have a history of HHC?: Yes (unsure of agency)  Does patient currently have [facility]?: No    Patient Information Continued  Income Source: Pension/nursing home  Does patient have prescription coverage?: Yes  Within the past 12 months, you worried that your food would run out before you got the money to buy more.: Never true  Within the past 12 months, the food you bought just didn't last and you didn't have money to get more.: Never true  Food insecurity resource given?: N/A  Does patient receive dialysis treatments?: No  Does patient have a history of substance abuse?: No  Does patient have a history of Mental Health Diagnosis?: No    Means of Transportation  Means of Transport to Appts[de-identified] Family transport  In the past 12 months, has lack of transportation kept you from medical appointments or from getting medications?: No  In the past 12 months, has lack of transportation kept you from meetings, work, or from getting things needed for daily living?: No  Was application for public transport provided?: N/A        DISCHARGE DETAILS:    Discharge planning discussed with[de-identified] Patient  Freedom of Choice: Yes  Comments - Freedom of Choice: Discussed FOC  CM contacted family/caregiver?: Yes  Were Treatment Team discharge recommendations reviewed with patient/caregiver?: Yes  Did patient/caregiver verbalize understanding of patient care needs?: Yes  Were patient/caregiver advised of the risks associated with not following Treatment Team discharge recommendations?: Yes    Contacts  Patient Contacts: Miriam Britton  Relationship to Patient[de-identified] Family  Contact Method: Phone  Phone Number: [phone]  Reason/Outcome: Continuity of Care, Emergency Contact, Discharge Planning    Other Referral/Resources/Interventions Provided:  Referral Comments: Patient is refusing STR, aware of risks of not following therapy recommendations, agreeable to [facility] [address], would like referral to JOHN ACKERMAN, entered in [address] [facility] reviewed d/c planning process including the following: identifying help at home, patient preference for d/c planning needs, Discharge Lounge, [facility] Meds to Bed program, availability of treatment team to discuss questions or concerns patient and/or family may have regarding understanding medications and recognizing signs and symptoms once discharged. CM also encouraged patient to follow up with all recommended appointments after discharge. Patient advised of importance for patient and family to participate in managing patient’s medical well being. Information obtained from patient and wife via phone. Patient lives with spouse in ranch/condo style home, 4 [address]. 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 Has walker and cane at home (uses cane mainly).   Has been to [facility] in past, vaccinated for covid

## 2023-09-17 NOTE — OCCUPATIONAL THERAPY NOTE
Occupational Therapy Evaluation     Patient Name: Rosalva Ocampo  YFWPD'U Date: 9/17/2023  Problem List  Active Problems:    Elevated troponin    Chronic kidney disease-mineral and bone disorder    Open fracture of temporal bone (HCC)    SDH (subdural hematoma) ([address])    Fall    SAH (subarachnoid hemorrhage) ([address])    Pneumocephalus    Past Medical History  Past Medical History:   Diagnosis Date    Acute kidney injury ([address])     Anemia Jan. 2022    Arthritis     Hands    Wright esophagus     BPH with obstruction/lower urinary tract symptoms     CAD (coronary artery disease)     Last assessed 09/16/15    Cancer ([address])     bladder    Cataract, acquired     Last assessed 10/10/17    Chronic kidney disease     Coronary artery disease     Diabetes mellitus ([address])     NIDDM    Diabetic neuropathy ([address])     Feet    Enlarged prostate with lower urinary tract symptoms (LUTS)     Last assessed 10/10/17    Erectile dysfunction     GERD (gastroesophageal reflux disease)     Last assessed 10/10/17    Hemoptysis     Last assessed 03/14/16    Hypercholesterolemia     Last assessed 10/10/17    Hypertension     Last assessed 10/10/17    Kidney stone     Macular degeneration     Right eye is particularly affected-peripheral vision intact    Myocardial infarction ([address]) 1998    OAB (overactive bladder)     Testicular hypofunction     Testicular hypogonadism     Last assessed 10/10/17    Tinnitus     Umbilical hernia     Last assessed 06/18/14    Urge incontinence of urine     Wears glasses      Past Surgical History  Past Surgical History:   Procedure Laterality Date    BLADDER SURGERY      COLONOSCOPY      CORONARY ARTERY BYPASS GRAFT  07/16/2014    ABG x 4 LIMA to LAD,SVG to diagnoal 2 SVG to OM-1, SVG to PDA, resection of partial plerual mass    CT GUIDED PERC DRAINAGE CATHETER PLACEMENT  02/19/2021    CYSTOSCOPY  2013    CYSTOSCOPY  02/08/2022    [place]    ESOPHAGOGASTRODUODENOSCOPY      FL RETROGRADE PYELOGRAM  12/20/2018 FL RETROGRADE PYELOGRAM  12/05/2019    INGUINAL HERNIA REPAIR Bilateral 2015    IR DRAINAGE TUBE CHECK AND/OR REMOVAL  03/05/2021    PHOTODYNAMIC THERAPY      For Barretts esophagus    [place] COLONOSCOPY FLX DX W/COLLJ SPEC WHEN PFRMD N/A 04/25/2016    Procedure: COLONOSCOPY;  Surgeon: Navi Casillas MD;  Location: [facility]; Service: Gastroenterology    [place] CYSTO W/REMOVAL OF LESIONS SMALL N/A 12/05/2019    Procedure: CYSTO W/TURBT AND TRANSURETHRAL PROSTATE BIOPSY AND OPENING OF BLADDER NECK CONTRACTURE, B/L Retrograde pyelogram;  Surgeon: Tim Wilson MD;  [facility];  Service: Urology    WV CYSTOURETHROSCOPY W/DEST &/RMVL MED BLADDER TY N/A 04/16/2020    Procedure: CYSTOSCOPY, TRANSURETHRAL RESECTION OF BLADDER TUMOR (TURBT); Surgeon: Tim Wilson MD;  [facility];  Service: Urology    WV CYSTOURETHROSCOPY WITH BIOPSY N/A 09/10/2020    Procedure: CYSTOSCOPY, COLLECTION OF LEFT KIDNEY CYTOLOGY, BILATERAL RETROGRADE PYELOGRAM, BLADDER WALL BIOPSIES  AND FULGERAION, RANDOM BLADDER TUMOR BIOPSIES;  Surgeon: Tim Wilson MD;  Location: [address] [place] [address] MAIN OR;  Service: Urology    [place] CYSTOURETHROSCOPY WITH BIOPSY N/A 04/05/2022    Procedure: urethroscopy , biopsy of urethra with fulgeration;  Surgeon: Reji Talley MD;  Location:  MAIN OR;  Service: Urology    PROSTATE SURGERY      TONSILLECTOMY      TRANSURETHRAL RESECTION OF PROSTATE N/A 12/20/2018    Procedure: CYSTO, PHOTO SELECTIVE VAPORIZATION OF PROSTATE, B/L RETROGRADE PYELOGRAM, TURBT;  Surgeon: Tim Wilson MD;  [facility];  Service: Urology    UMBILICAL HERNIA REPAIR  2012    UPPER GASTROINTESTINAL ENDOSCOPY           09/17/23 0920   OT Last Visit   OT Visit Date 09/17/23   Note Type   Note type Evaluation   Pain Assessment   Pain Assessment Tool 0-10   Pain Score 4   Pain Location/Orientation Location: Head   Hospital Pain Intervention(s) Repositioned; Ambulation/increased activity; Emotional support;Relaxation technique   Restrictions/Precautions   Weight Bearing Precautions Per Order No   Other Precautions Chair Alarm; Bed Alarm; Fall Risk;Pain   Home Living   Type of [address] Two level; Able to live on main level with bedroom/bathroom;Stairs to enter with rails   Home Equipment Walker;Cane   Prior Function   Level of [place] Independent with ADLs; Independent with functional mobility; Independent with IADLS   Lives With Spouse   Receives Help From [facility] in the last 6 months 1 to 4   Vocational Retired   [address] and mobility with occasional use of SPC pta - shares homemaking with spouse   Reciprocal Relationships supportive family   Service to Others retired   Intrinsic Gratification mostly sedentary   Subjective   Subjective offers no c/o   ADL   Eating Assistance 7  Independent   Grooming Assistance 5  Supervision/Setup    [address] 4  Minimal Assistance    [address] 3  Moderate Assistance   [address] [facility] 4  Minimal Assistance   [address] [place] [address] 3  Moderate [address]  3  Moderate Assistance   Bed Mobility   Supine to Sit 4  Minimal assistance   Additional Comments pt reports increased dizziness upon sitting and standing   Transfers   Sit to Stand 3  Moderate assistance   Stand to Sit 3  Moderate assistance   Functional Mobility   Functional Mobility 3  Moderate assistance   Additional items Rolling walker   Balance   Static Sitting Fair   Dynamic Sitting Fair -   Static Standing Poor +   Dynamic Standing Poor   Ambulatory Poor   Activity Tolerance   Activity Tolerance Patient limited by fatigue;Patient limited by pain   Medical Staff Made Aware Joann Gallegos DPT present for coeval 2* medical complexity, comorbidities and limited overall tolerance to activity   RUE Assessment   RUE Assessment WFL   LUE Assessment   LUE Assessment WFL   Cognition   Overall Cognitive Status WFL   Assessment   Limitation Decreased ADL status; Decreased endurance;Decreased self-care trans;Decreased high-level ADLs   Prognosis Good   Assessment Pt is a 80 y.o. male who was admitted to [address] [place] [address] on 9/15/2023 with SDH, traumatic pneumocephalus and temporal bone fx s/p fall backwards striking head . Patient  has a past medical history of Acute kidney injury ([address]), Anemia, Arthritis, Wright esophagus, BPH with obstruction/lower urinary tract symptoms, CAD (coronary artery disease), Cancer ([address]), Cataract, acquired, Chronic kidney disease, Coronary artery disease, Diabetes mellitus ([address]), Diabetic neuropathy ([address]), Enlarged prostate with lower urinary tract symptoms (LUTS), Erectile dysfunction, GERD (gastroesophageal reflux disease), Hemoptysis, Hypercholesterolemia, Hypertension, Kidney stone, Macular degeneration, Myocardial infarction ([address]), OAB (overactive bladder), Testicular hypofunction, Testicular hypogonadism, Tinnitus, Umbilical hernia, Urge incontinence of urine, and Wears glasses. At baseline pt was completing adls and mobility independently with occasional use of SPC - shares homemaking with spouse. Pt lives with wife in 2 story home with [facility]. Currently pt requires moderate assist for overall ADLS and min to mod assist for functional mobility/transfers. Pt currently presents with impairments in the following categories -steps to enter environment, difficulty performing ADLS, difficulty performing IADLS  and environment activity tolerance, endurance, standing balance/tolerance and sitting balance/tolerance. These impairments, as well as pt's fatigue, pain, risk for falls and home environment  limit pt's ability to safely engage in all baseline areas of occupation, includingbathing, dressing, toileting, functional mobility/transfers, community mobility, laundry , driving, house maintenance, meal prep, cleaning, social participation  and leisure activities  From OT standpoint, recommend inpt rehab upon D/C.  OT will continue to follow to address the below stated goals. Goals   Patient Goals get better   LTG Time Frame 10-14   Long Term Goal #1 refer to established goals below   Plan   Treatment Interventions ADL retraining;Functional transfer training; Endurance training;Patient/family training;Equipment evaluation/education; Compensatory technique education; Activityengagement   Goal Expiration Date 10/01/23   OT Frequency 2-3x/wk   Recommendation   OT Discharge Recommendation Post acute rehabilitation services   AM-PAC Daily Activity Inpatient   Lower Body Dressing 2   Bathing 2   Toileting 2   Upper Body Dressing 3   Grooming 4   Eating 4   Daily Activity Raw Score 17   Daily Activity Standardized Score (Calc for Raw Score >=11) 37.26   AM-PAC Applied Cognition Inpatient   Following a Speech/Presentation 3   Understanding Ordinary Conversation 4   Taking Medications 4   Remembering Where Things Are Placed or Put Away 4   Remembering List of 4-5 Errands 3   Taking Care of Complicated Tasks 3   Applied Cognition Raw Score 21   Applied Cognition Standardized Score 44.3   End of Consult   Education Provided Yes   Patient Position at End of Consult Bedside chair;Bed/Chair alarm activated; All needs within reach   Nurse Communication Nurse aware of consult       The patient's raw score on the AM-PAC Daily Activity Inpatient Short Form is 17. A raw score of less than 19 suggests the patient may benefit from discharge to post-acute rehabilitation services. Please refer to the recommendation of the Occupational Therapist for safe discharge planning.           OCCUPATIONAL THERAPY GOALS:    *Mod I adls after setup with use of AE PRN  *Mod I toileting and clothing management   *Mod I functional mobility and transfers to/from all surfaces with good dynamic balance and safety for participation in dynamic adls and iadl tasks   *Demonstrate good carryover with safe use of RW during functional tasks   *Assess DME needs   *Increase activity tolerance to 35-40 minutes for participation in [address] and enjoyable activities  *Pt to participate in ongoing functional cognitive assessment with good attention/participation to assist with safe d/c recommendations      [facility]

## 2023-09-17 NOTE — ASSESSMENT & PLAN NOTE
- troponin peaked at  458 9/16/23  - no active chest pain   - EKG shows no ischemia  - Cards consult, appreciate recs; non-MI troponin elevation, no intervention required  - ECHO performed: EF 54% (see details of report 9/15/23)

## 2023-09-17 NOTE — PLAN OF CARE
Problem: OCCUPATIONAL THERAPY ADULT  Goal: Performs self-care activities at highest level of function for planned discharge setting. See evaluation for individualized goals. Description: Treatment Interventions: ADL retraining, Functional transfer training, Endurance training, Patient/family training, Equipment evaluation/education, Compensatory technique education, Activityengagement          See flowsheet documentation for full assessment, interventions and recommendations. Note: Limitation: Decreased ADL status, Decreased endurance, Decreased self-care trans, Decreased high-level ADLs  Prognosis: Good  Assessment: Pt is a 80 y.o. male who was admitted to [address] [place] [address] on 9/15/2023 with SDH, traumatic pneumocephalus and temporal bone fx s/p fall backwards striking head . Patient  has a past medical history of Acute kidney injury ([address]), Anemia, Arthritis, Wright esophagus, BPH with obstruction/lower urinary tract symptoms, CAD (coronary artery disease), Cancer ([address]), Cataract, acquired, Chronic kidney disease, Coronary artery disease, Diabetes mellitus ([address]), Diabetic neuropathy ([address]), Enlarged prostate with lower urinary tract symptoms (LUTS), Erectile dysfunction, GERD (gastroesophageal reflux disease), Hemoptysis, Hypercholesterolemia, Hypertension, Kidney stone, Macular degeneration, Myocardial infarction ([address]), OAB (overactive bladder), Testicular hypofunction, Testicular hypogonadism, Tinnitus, Umbilical hernia, Urge incontinence of urine, and Wears glasses. At baseline pt was completing adls and mobility independently with occasional use of SPC - shares homemaking with spouse. Pt lives with wife in 2 story home with [facility]. Currently pt requires moderate assist for overall ADLS and min to mod assist for functional mobility/transfers.  Pt currently presents with impairments in the following categories -steps to enter environment, difficulty performing ADLS, difficulty performing IADLS  and environment activity tolerance, endurance, standing balance/tolerance and sitting balance/tolerance. These impairments, as well as pt's fatigue, pain, risk for falls and home environment  limit pt's ability to safely engage in all baseline areas of occupation, includingbathing, dressing, toileting, functional mobility/transfers, community mobility, laundry , driving, house maintenance, meal prep, cleaning, social participation  and leisure activities  From OT standpoint, recommend inpt rehab upon D/C. OT will continue to follow to address the below stated goals.      OT Discharge Recommendation: Post acute rehabilitation services

## 2023-09-17 NOTE — PROGRESS NOTES
-- Patient:  -- MRN: [id number]  -- Aidin Request ID: [id number]  -- Level of care reserved: Kristy Hoskins  -- Partner Reserved: [facility], CHRISTY,  [facility] [phone]  -- Clinical needs requested:  -- Geography searched: [id number]  -- Start of Service:  -- Request sent: 10:59am EDT on 9/17/2023 by Deena Choudhury  -- Partner reserved: 11:07am EDT on 9/17/2023 by Deena Choudhury  -- Choice list shared:

## 2023-09-17 NOTE — PLAN OF CARE
Problem: MOBILITY - ADULT  Goal: Maintain or return to baseline ADL function  Description: INTERVENTIONS:  -  Assess patient's ability to carry out ADLs; assess patient's baseline for ADL function and identify physical deficits which impact ability to perform ADLs (bathing, care of mouth/teeth, toileting, grooming, dressing, etc.)  - Assess/evaluate cause of self-care deficits   - Assess range of motion  - Assess patient's mobility; develop plan if impaired  - Assess patient's need for assistive devices and provide as appropriate  - Encourage maximum independence but intervene and supervise when necessary  - Involve family in performance of ADLs  - Assess for home care needs following discharge   - Consider OT consult to assist with ADL evaluation and planning for discharge  - Provide patient education as appropriate  Outcome: Progressing  Goal: Maintains/Returns to pre admission functional level  Description: INTERVENTIONS:  - Perform BMAT or MOVE assessment daily.   - Set and communicate daily mobility goal to care team and patient/family/caregiver.    - Collaborate with rehabilitation services on mobility goals if consulted  - Record patient progress and toleration of activity level   Outcome: Progressing     Problem: PAIN - ADULT  Goal: Verbalizes/displays adequate comfort level or baseline comfort level  Description: Interventions:  - Encourage patient to monitor pain and request assistance  - Assess pain using appropriate pain scale  - Administer analgesics based on type and severity of pain and evaluate response  - Implement non-pharmacological measures as appropriate and evaluate response  - Consider cultural and social influences on pain and pain management  - Notify physician/advanced practitioner if interventions unsuccessful or patient reports new pain  Outcome: Progressing     Problem: INFECTION - ADULT  Goal: Absence or prevention of progression during hospitalization  Description: INTERVENTIONS:  - Assess and monitor for signs and symptoms of infection  - Monitor lab/diagnostic results  - Monitor all insertion sites, i.e. indwelling lines, tubes, and drains  - Monitor endotracheal if appropriate and nasal secretions for changes in amount and color  - [place] appropriate cooling/warming therapies per order  - Administer medications as ordered  - Instruct and encourage patient and family to use good hand hygiene technique  - Identify and instruct in appropriate isolation precautions for identified infection/condition  Outcome: Progressing     Problem: DISCHARGE PLANNING  Goal: Discharge to home or other facility with appropriate resources  Description: INTERVENTIONS:  - Identify barriers to discharge w/patient and caregiver  - Arrange for needed discharge resources and transportation as appropriate  - Identify discharge learning needs (meds, wound care, etc.)  - Arrange for interpretive services to assist at discharge as needed  - Refer to Case Management Department for coordinating discharge planning if the patient needs post-hospital services based on physician/advanced practitioner order or complex needs related to functional status, cognitive ability, or social support system  Outcome: Progressing     Problem: Knowledge Deficit  Goal: Patient/family/caregiver demonstrates understanding of disease process, treatment plan, medications, and discharge instructions  Description: Complete learning assessment and assess knowledge base.   Interventions:  - Provide teaching at level of understanding  - Provide teaching via preferred learning methods  Outcome: Progressing     Problem: Prexisting or High Potential for Compromised Skin Integrity  Goal: Skin integrity is maintained or improved  Description: INTERVENTIONS:  - Identify patients at risk for skin breakdown  - Assess and monitor skin integrity  - Assess and monitor nutrition and hydration status  - Monitor labs   - Assess for incontinence   - Turn and reposition patient  - Assist with mobility/ambulation  - Relieve pressure over bony prominences  - Avoid friction and shearing  - Provide appropriate hygiene as needed including keeping skin clean and dry  - Evaluate need for skin moisturizer/barrier cream  - Collaborate with interdisciplinary team   - Patient/family teaching  - Consider wound care consult   Outcome: Progressing

## 2023-09-17 NOTE — PROGRESS NOTES
Repeat CT head personally reviewed. R SDH / SAH appear stable per my review, no increase in size or new hemorrhage appreciated. Final read pending. Plan:  · Continue frequent neurological checks. · STAT CT head with any neurological decline including drop GCS of 2pts within 1 hr.  · Recommend SBP <160mmHg. · Keppra per primary team   · Hold all therapeutic antiplatelet and anticoagulation medications. · Pain control per primary team  · Mobilize with PT/OT  · DVT PPX: SCDs, ok to start pharm ppx  · Ongoing medical management per primary team/trauma. · No neurosurgical intervention indicated at this juncture. Neurosurgery will s/o. Follow up in 2 weeks with repeat CT head. Please call with questions or concerns.

## 2023-09-17 NOTE — PROGRESS NOTES
[address] [facility]  Progress Note  Name: Helen Jorgensen  MRN: [id number]  Unit/Bed#: [facility] 028-81 I Date of Admission: 9/15/2023   Date of Service: 9/17/2023 I Hospital Day: 2    Assessment/Plan   Pneumocephalus  Assessment & Plan  See above    SAH (subarachnoid hemorrhage) [facility])  Assessment & Plan  See above    Fall  Assessment & Plan  - Status post fall with noted injuries. - Fall precautions. - Geriatric Medicine consultation for evaluation, medication review and recommendations.  - PT and OT evaluation and treatment as indicated. - Case Management consultation for disposition planning. SDH (subdural hematoma) ([facility])  Assessment & Plan  See above    Open fracture of temporal bone [facility])  Assessment & Plan  Temporal bone fracture noted on CT 9/15, with associated subdural hematoma, subarachnoid hemorrhage. Noted continued bleeding from right ear s/p administration of afrin  - Neuro exam: stable  - Continue neuro checks  - Appreciate neurosurgery and ENT recommendations  - Unasyn for treatment of open fracture  - Complete 7 day course of Keppra for seizure prophylaxis  - Chemical DVT prophylaxis: Not cleared for chemical prophylaxis by neurosurgery at this time. Continue SCDs bilaterally. - Hold all anticoagulants and anti platelet medications until cleared by neurosurgery to resume  - PT/OT and cognitive evaluation  - Consider CTV/CTA per neurosurgery recommendation given patient has CKD4.  Consider getting repeat non con CT head, nephro recs appreciated, following creatinine, plan for non con CT head until nephro clearance for CTV        Chronic kidney disease-mineral and bone disorder  Assessment & Plan  Lab Results   Component Value Date    EGFR 23 09/17/2023    EGFR 25 09/15/2023    EGFR 22 09/05/2023    CREATININE 2.45 (H) 09/17/2023    CREATININE 2.28 (H) 09/15/2023    CREATININE 2.50 (H) 09/05/2023     · nephro recs appreciated    Elevated troponin  Assessment & Plan  - troponin peaked at  458 overnight  - no active chest pain   - EKG shows no ischemia  - Cards consult, appreciate recs  - ECHO performed: EF 54% (see details of report 9/15/23)           Bowel Regimen: colace  VTE Prophylaxis:Reason for no pharmacologic prophylaxis SDH     Disposition: PT/ OT eval pending    Subjective   Chief Complaint: nausea    Subjective: some nausea with standing for CT this morning now relieved. Had one episode of vomiting. Objective   Vitals:   Temp:  [97.3 °F (36.3 °C)-99.5 °F (37.5 °C)] 98.1 °F (36.7 °C)  HR:  [66-68] 68  Resp:  [17-22] 19  BP: (134-173)/(61-79) 159/75    I/O       09/15 0701  09/16 0700 09/16 0701  09/17 0700 09/17 0701  09/18 0700    P. O.  0     IV Piggyback 500      Total Intake(mL/kg) 500 0 (0)     Urine (mL/kg/hr) 1200 550 (0.3)     Total Output 1200 550     Net -700 -550                   Physical Exam:   GENERAL APPEARANCE:  NAD, non-toxic appearing  NEURO:  Awake, GCS 15, no focal deficits  HEENT:  Moist mucous membranes, NC/AT  CV:  Regular rate and rhythm  LUNGS:  Clear and equal breath sounds to auscultation bilaterally  GI:  Soft, nontender, and nondistended  MSK:  Moving all extremities, motor/ neuro intact  SKIN:  Warm, dry, and intact      Invasive Devices     Peripheral Intravenous Line  Duration           Peripheral IV 09/15/23 Left;Proximal;Ventral (anterior) Forearm 1 day          Drain  Duration           Urostomy -- days    Urostomy Ureterostomy right RLQ 1 day                      Lab Results: Results: I have personally reviewed all pertinent laboratory/tests results  Imaging: I have personally reviewed pertinent reports.      Other Studies:

## 2023-09-17 NOTE — PROGRESS NOTES
NEPHROLOGY PROGRESS NOTE   Paulino Vicente 80 y.o. male MRN: [id number]  Unit/Bed#: [facility] 603-01 Encounter: [id number]      ASSESSMENT & PLAN:  #1 chronic kidney stage IV baseline creatinine around 2.5-2.8, follows with Dr. Sonya Holstein as an outpatient. Kidney function remains stable with a creatinine of 2.45 this morning  Status post CT scan chest abdomen and pelvis with IV contrast on 9/15. Neurosurgery note this morning reviewed -they signed off and plan to to follow-up in 2 weeks with repeat CAT scan of the head  Avoid relative hypotension. Follow daily labs     2 status post fall complicated with temporal bone fracture.   ENT and neurosurgery on board.     #3 acute multicompartment hemorrhage, SAH and SDH, neurosurgery on board, note this morning reviewed, repeat CAT scan seems to be stable, they signed off and plan to follow-up with repeat CAT scan in 2 weeks.     #4 non-MI troponin elevation, history of CAD status post CABG, cardiology consulted     5 hypertension, blood pressure overall acceptable, continue current regimen     6 anemia, suspected component of chronic kidney disease, last hemoglobin 10.3 on 9/15           SUBJECTIVE:  Patient seen and examined, in general doing well, continue complaining of some mild headaches, denies any nausea, no vomiting, no abdominal pain, no chest pain or shortness of breath    OBJECTIVE:  Current Weight: Weight - Scale: 83.5 kg (184 lb)  Vitals:    09/17/23 0723   BP: 159/75   Pulse: 68   Resp: 19   Temp: 98.1 °F (36.7 °C)   SpO2: 94%       Intake/Output Summary (Last 24 hours) at 9/17/2023 1028  Last data filed at 9/17/2023 0601  Gross per 24 hour   Intake 0 ml   Output 1150 ml   Net -1150 ml       General: conscious, cooperative, in not acute distress  Eyes: conjunctivae pink, anicteric sclerae  ENT: lips and mucous membranes moist  Neck: supple, no JVD  Chest: clear breath sounds bilateral, no crackles, ronchus or wheezings  CVS: distinct S1 & S2, normal rate, regular rhythm  Abdomen: soft, non-tender, non-distended, normoactive bowel sounds  Extremities: no edema of both legs  Skin: no rash  Neuro: awake, alert, oriented        Medications:    Current Facility-Administered Medications:   •  acetaminophen (TYLENOL) tablet 650 mg, 650 mg, Oral, Q6H PRN, Maria C Rahman MD, 650 mg at 09/17/23 0857  •  ampicillin-sulbactam (UNASYN) 3 g in sodium chloride 0.9 % 100 mL IVPB, 3 g, Intravenous, Q12H, Maria C Rahman MD, Last Rate: 200 mL/hr at 09/17/23 0859, 3 g at 09/17/23 0859  •  atorvastatin (LIPITOR) tablet 40 mg, 40 mg, Oral, HS, Maria C Rahman MD, 40 mg at 09/16/23 2102  •  citalopram (CeleXA) tablet 20 mg, 20 mg, Oral, Daily, Maria C Rahman MD, 20 mg at 09/17/23 0857  •  docusate sodium (COLACE) capsule 100 mg, 100 mg, Oral, BID, Bee Haley MD, 100 mg at 09/17/23 0857  •  HYDROmorphone HCl (DILAUDID) injection 0.2 mg, 0.2 mg, Intravenous, Q6H PRN, Maria C Rahman MD  •  insulin lispro (HumaLOG) 100 units/mL subcutaneous injection 1-6 Units, 1-6 Units, Subcutaneous, TID AC, 1 Units at 09/17/23 0859 **AND** Fingerstick Glucose (POCT), , , TID AC, Maria C Rahman MD  •  levETIRAcetam (KEPPRA) 500 mg in sodium chloride 0.9 % 100 mL IVPB, 500 mg, Intravenous, Q12H [facility], Reena Gandhi PA-C, Last Rate: 400 mL/hr at 09/17/23 0859, 500 mg at 09/17/23 0859  •  ofloxacin (OCUFLOX) 0.3 % ophthalmic solution 5 drop, 5 drop, Right Eye, 4x Daily, Sydnie Butterfield, , 5 drop at 09/17/23 0859  •  ondansetron (ZOFRAN) injection 4 mg, 4 mg, Intravenous, Q6H PRN, Reena Gandhi PA-C, 4 mg at 09/17/23 1304  •  oxyCODONE (ROXICODONE) IR tablet 5 mg, 5 mg, Oral, Q8H PRN, Maria C Rahman MD, 5 mg at 09/16/23 2020  •  oxyCODONE (ROXICODONE) split tablet 2.5 mg, 2.5 mg, Oral, Q8H PRN, Maria C Rahman MD  •  pantoprazole (PROTONIX) EC tablet 40 mg, 40 mg, Oral, Early Morning, Maria C Rahman MD, 40 mg at 09/17/23 0506    Invasive Devices:        Lab Results:   Results from last 7 days   Lab Units 09/17/23  0446 09/15/23  1306   WBC Thousand/uL  --  15.04*   HEMOGLOBIN g/dL  --  10.3*   HEMATOCRIT %  --  31.4*   PLATELETS Thousands/uL  --  233   SODIUM mmol/L 136 134*   POTASSIUM mmol/L 3.9 3.8   CHLORIDE mmol/L 107 103   CO2 mmol/L 20* 16*   BUN mg/dL 44* 57*   CREATININE mg/dL 2.45* 2.28*   CALCIUM mg/dL 8.1* 8.5   ALK PHOS U/L  --  97   ALT U/L  --  15   AST U/L  --  17       Previous work up:  See previous notes      Portions of the record may have been created with voice recognition software. Occasional wrong word or "sound a like" substitutions may have occurred due to the inherent limitations of voice recognition software. Read the chart carefully and recognize, using context, where substitutions have occurred. If you have any questions, please contact the dictating provider.

## 2023-09-17 NOTE — ASSESSMENT & PLAN NOTE
Temporal bone fracture noted on CT 9/15, with associated subdural hematoma, subarachnoid hemorrhage. Noted continued bleeding from right ear s/p administration of afrin  - Neuro exam: stable  - Continue neuro checks  - Appreciate neurosurgery and ENT recommendations  - Unasyn for treatment of open fracture  - Complete 7 day course of Keppra for seizure prophylaxis  - Chemical DVT prophylaxis: cleared by NSx 9/17/23  - PT/OT and cognitive evaluation  - Consider CTV/CTA per neurosurgery recommendation given patient has CKD4.  Consider getting repeat non con CT head, nephro recs appreciated, following creatinine, plan for non con CT head until nephro clearance for CTV

## 2023-09-17 NOTE — PLAN OF CARE
Problem: PHYSICAL THERAPY ADULT  Goal: Performs mobility at highest level of function for planned discharge setting. See evaluation for individualized goals. Description: Treatment/Interventions: Functional transfer training, LE strengthening/ROM, Elevations, Therapeutic exercise, Endurance training, Patient/family training, Equipment eval/education, Bed mobility, Gait training, Spoke to nursing, OT  Equipment Recommended: Deborah Marina       See flowsheet documentation for full assessment, interventions and recommendations. Note: Prognosis: Good  Problem List: Decreased strength, Decreased endurance, Impaired balance, Decreased mobility, Pain  Assessment: Pt is 80 y.o. male seen for PT evaluation s/p admit to [address] [place] [address] on 9/15/2023. Two pt identifiers were used to confirm. Pt presented s/p fall while moving something into his truck. Pt was admitted with a primary dx of: Open fractureof temporal bone, subdural hematoma, subarachnoid hemorrhage, and other active problems including CKD, pneumocephalus, elevated troponin. PT now consulted for assessment of mobility and d/c needs. Pt with Up in chair orders. Pts current co morbidities affecting treatment include:  has a past medical history of Acute kidney injury ([address]), Anemia, Arthritis, Wright esophagus, BPH with obstruction/lower urinary tract symptoms, CAD (coronary artery disease), Cancer ([address]), Cataract, acquired, Chronic kidney disease, Coronary artery disease, Diabetes mellitus ([address]), Diabetic neuropathy ([address]), Enlarged prostate with lower urinary tract symptoms (LUTS), Erectile dysfunction, GERD (gastroesophageal reflux disease), Hemoptysis, Hypercholesterolemia, Hypertension, Kidney stone, Macular degeneration, Myocardial infarction ([address]), OAB (overactive bladder), Testicular hypofunction, Testicular hypogonadism, Tinnitus, Umbilical hernia, Urge incontinence of urine, and Wears glasses. . Pts current clinical presentation is Unstable/ Unpredictable (high complexity) due to Ongoing medical management for primary dx, Increased reliance on more restrictive AD compared to baseline, Decreased activity tolerance compared to baseline, Fall risk, Increased assistance needed from caregiver at current time, Continuous pulse oximetry monitoring   . Upon evaluation, pt currently is requiring Min Ax1 for bed mobility; Mod Ax1 for transfers and Min Ax1 for ambulation w/ RW. Pt presents at PT eval functioning below baseline and currently w/ overall mobility deficits 2* to: BLE weakness, impaired balance, decreased endurance, gait deviations, decreased activity tolerance compared to baseline, fall risk. Pt currently at a fall risk 2* to impairments listed above. Based on the aforementioned PT evaluation, pt will continue to benefit from skilled Acute PT interventions to address stated impairments; to maximize functional mobility; for ongoing pt/ family training; and DME needs. At conclusion of PT session pt returned back in chair and chair alarm engaged with phone and call bell within reach. Pt denies any further questions at this time. PT is currently recommending Rehab. PT will continue to follow during hospital stay. PT Discharge Recommendation: Post acute rehabilitation services    See flowsheet documentation for full assessment.

## 2023-09-17 NOTE — PHYSICAL THERAPY NOTE
PHYSICAL THERAPY EVALUATION  NAME:  Esteban Puentes  DATE: 09/17/23    AGE:   80 y.o. Mrn:   [id number]  ADMIT DX:  Subdural hemorrhage ([address]) [I62.00]  Pneumocephalus, traumatic [G93.89]  Temporal bone fracture ([address]) [E09.30HL]  Traumatic injury of head, initial encounter [S09.90XA]  Unspecified multiple injuries, initial encounter [T07. XXXA]    Past Medical History:   Diagnosis Date    Acute kidney injury ([address])     Anemia Jan. 2022    Arthritis     Hands    Wright esophagus     BPH with obstruction/lower urinary tract symptoms     CAD (coronary artery disease)     Last assessed 09/16/15    Cancer ([address])     bladder    Cataract, acquired     Last assessed 10/10/17    Chronic kidney disease     Coronary artery disease     Diabetes mellitus ([address])     NIDDM    Diabetic neuropathy ([address])     Feet    Enlarged prostate with lower urinary tract symptoms (LUTS)     Last assessed 10/10/17    Erectile dysfunction     GERD (gastroesophageal reflux disease)     Last assessed 10/10/17    Hemoptysis     Last assessed 03/14/16    Hypercholesterolemia     Last assessed 10/10/17    Hypertension     Last assessed 10/10/17    Kidney stone     Macular degeneration     Right eye is particularly affected-peripheral vision intact    Myocardial infarction ([address]) 1998    OAB (overactive bladder)     Testicular hypofunction     Testicular hypogonadism     Last assessed 10/10/17    Tinnitus     Umbilical hernia     Last assessed 06/18/14    Urge incontinence of urine     Wears glasses        Past Surgical History:   Procedure Laterality Date    BLADDER SURGERY      COLONOSCOPY      CORONARY ARTERY BYPASS GRAFT  07/16/2014    ABG x 4 LIMA to LAD,SVG to diagnoal 2 SVG to OM-1, SVG to PDA, resection of partial plerual mass    CT GUIDED PERC DRAINAGE CATHETER PLACEMENT  02/19/2021    CYSTOSCOPY  2013    CYSTOSCOPY  02/08/2022    [place]    ESOPHAGOGASTRODUODENOSCOPY      FL RETROGRADE PYELOGRAM  12/20/2018    FL RETROGRADE PYELOGRAM 12/05/2019    INGUINAL HERNIA REPAIR Bilateral 2015    IR DRAINAGE TUBE CHECK AND/OR REMOVAL  03/05/2021    PHOTODYNAMIC THERAPY      For Barretts esophagus    [place] COLONOSCOPY FLX DX W/COLLJ SPEC WHEN PFRMD N/A 04/25/2016    Procedure: COLONOSCOPY;  Surgeon: Shaila Irving MD;  Location:  GI LAB; Service: Gastroenterology    NY CYSTO W/REMOVAL OF LESIONS SMALL N/A 12/05/2019    Procedure: CYSTO W/TURBT AND TRANSURETHRAL PROSTATE BIOPSY AND OPENING OF BLADDER NECK CONTRACTURE, B/L Retrograde pyelogram;  Surgeon: Fina uBrden MD;  [facility];  Service: Urology    NY CYSTOURETHROSCOPY W/DEST &/RMVL MED BLADDER TY N/A 04/16/2020    Procedure: CYSTOSCOPY, TRANSURETHRAL RESECTION OF BLADDER TUMOR (TURBT); Surgeon: Fina Burden MD;  [facility];  Service: Urology    NY CYSTOURETHROSCOPY WITH BIOPSY N/A 09/10/2020    Procedure: CYSTOSCOPY, COLLECTION OF LEFT KIDNEY CYTOLOGY, BILATERAL RETROGRADE PYELOGRAM, BLADDER WALL BIOPSIES  AND FULGERAION, RANDOM BLADDER TUMOR BIOPSIES;  Surgeon: Fina Burden MD;  Location: [address] [place] [address] MAIN OR;  Service: Urology    NY CYSTOURETHROSCOPY WITH BIOPSY N/A 04/05/2022    Procedure: urethroscopy , biopsy of urethra with fulgeration;  Surgeon: Junior Morfin MD;  Location:  MAIN OR;  Service: Urology    PROSTATE SURGERY      TONSILLECTOMY      TRANSURETHRAL RESECTION OF PROSTATE N/A 12/20/2018    Procedure: CYSTO, PHOTO SELECTIVE VAPORIZATION OF PROSTATE, B/L RETROGRADE PYELOGRAM, TURBT;  Surgeon: Fina Burden MD;  [facility];  Service: Urology    UMBILICAL HERNIA REPAIR  2012    UPPER GASTROINTESTINAL ENDOSCOPY         Length Of Stay: 2    PHYSICAL THERAPY EVALUATION:        09/17/23 0984   Note Type   Note type Evaluation   Pain Assessment   Pain Assessment Tool 0-10   Pain Score 4   Pain Location/Orientation Location: Head   Pain Onset/Description Onset: Ongoing;Frequency: Constant/Continuous; Descriptor: Aching   Effect of Pain on Daily Activities no change in pain with activity   Patient's Stated Pain Goal No pain   Hospital Pain Intervention(s) Ambulation/increased activity;Repositioned   Restrictions/Precautions   Weight Bearing Precautions Per Order No   Other Precautions Chair Alarm; Bed Alarm;Multiple lines; Fall Risk;Pain   Home Living   Type of [address] Two level; Able to live on main level with bedroom/bathroom;Stairs to enter with rails   Home Equipment Walker;Cane   Additional Comments Pt reports living with spouse who is able to provide assist if needed   Prior Function   Level of [place] Independent with functional mobility   Lives With Spouse   Receives Help From [facility] in the last 6 months 1 to 4   Comments Pt reports the occasional use of a SPC for ambulation PTA   General   Family/Caregiver Present No   Cognition   Overall Cognitive Status WFL   Arousal/Participation Alert   Orientation Level Oriented X4   Memory Within functional limits   Following Commands Follows one step commands without difficulty   RUE Assessment   RUE Assessment WFL   LUE Assessment   LUE Assessment WFL   RLE Assessment   RLE Assessment WFL   Strength RLE   RLE Overall Strength 4-/5   LLE Assessment   LLE Assessment WFL   Strength LLE   LLE Overall Strength 4-/5   Bed Mobility   Supine to Sit 4  Minimal assistance   Additional items Assist x 1; Increased time required;Verbal cues   Transfers   Sit to Stand 3  Moderate assistance   Additional items Assist x 1; Increased time required;Verbal cues   Stand to Sit 3  Moderate assistance   Additional items Assist x 1; Increased time required;Verbal cues   Additional Comments cues needed for hand placement during transfers   Ambulation/Elevation   Gait pattern Excessively slow; Short stride; Foward flexed; Inconsistent santy   Gait Assistance 4  Minimal assist   Additional items Assist x 1   Assistive Device Rolling walker   Distance 15ft x 2   Balance   Static Sitting Fair -   Static Standing Poor +   Ambulatory Poor +   Endurance Deficit   Endurance Deficit Yes   Endurance Deficit Description fatigue   Activity Tolerance   Activity Tolerance Patient limited by fatigue   Medical Staff Made Aware Sae Grimes OT; OT present for co evaluation due to pts current medical presentation   Nurse Made Aware Pt appropriate to be seen and mobilize per nsg   Assessment   Prognosis Good   Problem List Decreased strength;Decreased endurance; Impaired balance;Decreased mobility;Pain   Assessment Pt is 80 y.o. male seen for PT evaluation s/p admit to [address] [place] [address] on 9/15/2023. Two pt identifiers were used to confirm. Pt presented s/p fall while moving something into his truck. Pt was admitted with a primary dx of: Open fractureof temporal bone, subdural hematoma, subarachnoid hemorrhage, and other active problems including CKD, pneumocephalus, elevated troponin. PT now consulted for assessment of mobility and d/c needs. Pt with Up in chair orders. Pts current co morbidities affecting treatment include:  has a past medical history of Acute kidney injury ([address]), Anemia, Arthritis, Wright esophagus, BPH with obstruction/lower urinary tract symptoms, CAD (coronary artery disease), Cancer ([address]), Cataract, acquired, Chronic kidney disease, Coronary artery disease, Diabetes mellitus ([address]), Diabetic neuropathy ([address]), Enlarged prostate with lower urinary tract symptoms (LUTS), Erectile dysfunction, GERD (gastroesophageal reflux disease), Hemoptysis, Hypercholesterolemia, Hypertension, Kidney stone, Macular degeneration, Myocardial infarction ([address]), OAB (overactive bladder), Testicular hypofunction, Testicular hypogonadism, Tinnitus, Umbilical hernia, Urge incontinence of urine, and Wears glasses. . Pts current clinical presentation is Unstable/ Unpredictable (high complexity) due to Ongoing medical management for primary dx, Increased reliance on more restrictive AD compared to baseline, Decreased activity tolerance compared to baseline, Fall risk, Increased assistance needed from caregiver at current time, Continuous pulse oximetry monitoring   . Upon evaluation, pt currently is requiring Min Ax1 for bed mobility; Mod Ax1 for transfers and Min Ax1 for ambulation w/ RW. Pt presents at PT eval functioning below baseline and currently w/ overall mobility deficits 2* to: BLE weakness, impaired balance, decreased endurance, gait deviations, decreased activity tolerance compared to baseline, fall risk. Pt currently at a fall risk 2* to impairments listed above. Based on the aforementioned PT evaluation, pt will continue to benefit from skilled Acute PT interventions to address stated impairments; to maximize functional mobility; for ongoing pt/ family training; and DME needs. At conclusion of PT session pt returned back in chair and chair alarm engaged with phone and call bell within reach. Pt denies any further questions at this time. PT is currently recommending Rehab. PT will continue to follow during hospital stay. Goals   Patient Goals " to get better"   STG Expiration Date 09/27/23   Short Term Goal #1 In 10 days pt will complete: 1) Bed mobility skills with mod I to increase safety and independence as well as decrease caregiver burden. 2) Functional transfers with mod I to promote increased independence, safety, and QOL. 3) Ambulate 150' using least restrictive AD with mod I without LOB and stable vitals so that pt can negotiate previous living environment safely and promote independence with functional mobility and return to PLOF. 4) Stair training up/ down 3 step/s using rail/s with mod I so that pt can enter/negotiate previous living environment safely and decrease fall risk. 5) Improve balance grades by 1/2 grade to increase safety with all mobility and decrease fall risk. 6) Improve BLE strength by 1/2 grade to help increase overall functional mobility and decrease fall risk.    Plan   Treatment/Interventions Functional transfer training;LE strengthening/ROM; Elevations; Therapeutic exercise; Endurance training;Patient/family training;Equipment eval/education; Bed mobility;Gait training;Spoke to nursing;OT   PT Frequency 3-5x/wk   Recommendation   PT Discharge Recommendation Post acute rehabilitation services   Equipment Recommended [facility] Recommended Wheeled walker   AM-PAC Basic Mobility Inpatient   Turning in Flat Bed Without Bedrails 3   Lying on Back to Sitting on Edge of Flat Bed Without Bedrails 3   Moving Bed to Chair 2   Standing Up From Chair Using Arms 2   Walk in Room 3   Climb 3-5 Stairs With Railing 2   Basic Mobility Inpatient Raw Score 15   Basic Mobility Standardized Score 36.97   Highest Level Of Mobility   JH-HLM Goal 4: Move to chair/commode   JH-HLM Achieved 6: Walk 10 steps or more   Modified David Scale   Modified David Scale 4   Barthel Index   Feeding 10   Bathing 0   Grooming Score 5   Dressing Score 5   Bladder Score 0   Bowels Score 10   Toilet Use Score 5   Transfers (Bed/Chair) Score 10   Mobility (Level Surface) Score 0   Stairs Score 0   Barthel Index Score 45   Portions of the documentation may have been created using voice recognition software. Occasional wrong word or sound alike substitutions may have occurred due to the inherent limitations of the voice recognition software. Read the chart carefully and recognize, using context, where substitutions have occurred.     Senia Son, PT, DPT

## 2023-09-17 NOTE — ASSESSMENT & PLAN NOTE
Lab Results   Component Value Date    EGFR 23 09/17/2023    EGFR 25 09/15/2023    EGFR 22 09/05/2023    CREATININE 2.45 (H) 09/17/2023    CREATININE 2.28 (H) 09/15/2023    CREATININE 2.50 (H) 09/05/2023     · nephro recs appreciated

## 2023-09-18 ENCOUNTER — TELEPHONE (OUTPATIENT)
Dept: NEUROSURGERY | Facility: CLINIC | Age: 83
End: 2023-09-18

## 2023-09-18 ENCOUNTER — TELEPHONE (OUTPATIENT)
Dept: HEMATOLOGY ONCOLOGY | Facility: CLINIC | Age: 83
End: 2023-09-18

## 2023-09-18 LAB
ANION GAP SERPL CALCULATED.3IONS-SCNC: 8 MMOL/L
BUN SERPL-MCNC: 40 MG/DL (ref 5–25)
CALCIUM SERPL-MCNC: 8.3 MG/DL (ref 8.4–10.2)
CHLORIDE SERPL-SCNC: 106 MMOL/L (ref 96–108)
CO2 SERPL-SCNC: 21 MMOL/L (ref 21–32)
CREAT SERPL-MCNC: 2.23 MG/DL (ref 0.6–1.3)
GFR SERPL CREATININE-BSD FRML MDRD: 26 ML/MIN/1.73SQ M
GLUCOSE SERPL-MCNC: 116 MG/DL (ref 65–140)
GLUCOSE SERPL-MCNC: 119 MG/DL (ref 65–140)
GLUCOSE SERPL-MCNC: 120 MG/DL (ref 65–140)
GLUCOSE SERPL-MCNC: 190 MG/DL (ref 65–140)
POTASSIUM SERPL-SCNC: 3.9 MMOL/L (ref 3.5–5.3)
SL CV LV EF: 55
SODIUM SERPL-SCNC: 135 MMOL/L (ref 135–147)

## 2023-09-18 PROCEDURE — 99232 SBSQ HOSP IP/OBS MODERATE 35: CPT | Performed by: NURSE PRACTITIONER

## 2023-09-18 PROCEDURE — 82948 REAGENT STRIP/BLOOD GLUCOSE: CPT

## 2023-09-18 PROCEDURE — 99232 SBSQ HOSP IP/OBS MODERATE 35: CPT | Performed by: SURGERY

## 2023-09-18 PROCEDURE — 99232 SBSQ HOSP IP/OBS MODERATE 35: CPT | Performed by: INTERNAL MEDICINE

## 2023-09-18 PROCEDURE — 80048 BASIC METABOLIC PNL TOTAL CA: CPT | Performed by: INTERNAL MEDICINE

## 2023-09-18 RX ORDER — AMLODIPINE BESYLATE 5 MG/1
5 TABLET ORAL DAILY
Status: DISCONTINUED | OUTPATIENT
Start: 2023-09-18 | End: 2023-09-19 | Stop reason: HOSPADM

## 2023-09-18 RX ORDER — ACETAMINOPHEN 325 MG/1
975 TABLET ORAL EVERY 8 HOURS SCHEDULED
Status: DISCONTINUED | OUTPATIENT
Start: 2023-09-18 | End: 2023-09-19 | Stop reason: HOSPADM

## 2023-09-18 RX ORDER — POLYETHYLENE GLYCOL 3350 17 G/17G
17 POWDER, FOR SOLUTION ORAL DAILY PRN
Status: DISCONTINUED | OUTPATIENT
Start: 2023-09-18 | End: 2023-09-19 | Stop reason: HOSPADM

## 2023-09-18 RX ADMIN — OFLOXACIN 5 DROP: 3 SOLUTION OPHTHALMIC at 12:22

## 2023-09-18 RX ADMIN — ACETAMINOPHEN 975 MG: 325 TABLET, FILM COATED ORAL at 21:05

## 2023-09-18 RX ADMIN — PANTOPRAZOLE SODIUM 40 MG: 40 TABLET, DELAYED RELEASE ORAL at 05:20

## 2023-09-18 RX ADMIN — OFLOXACIN 5 DROP: 3 SOLUTION OPHTHALMIC at 17:22

## 2023-09-18 RX ADMIN — OFLOXACIN 5 DROP: 3 SOLUTION OPHTHALMIC at 08:53

## 2023-09-18 RX ADMIN — ACETAMINOPHEN 975 MG: 325 TABLET, FILM COATED ORAL at 13:41

## 2023-09-18 RX ADMIN — ACETAMINOPHEN 650 MG: 325 TABLET, FILM COATED ORAL at 05:23

## 2023-09-18 RX ADMIN — ENOXAPARIN SODIUM 30 MG: 30 INJECTION SUBCUTANEOUS at 08:52

## 2023-09-18 RX ADMIN — SODIUM CHLORIDE 3 G: 9 INJECTION, SOLUTION INTRAVENOUS at 21:37

## 2023-09-18 RX ADMIN — CITALOPRAM HYDROBROMIDE 20 MG: 20 TABLET ORAL at 08:52

## 2023-09-18 RX ADMIN — DOCUSATE SODIUM 100 MG: 100 CAPSULE, LIQUID FILLED ORAL at 17:22

## 2023-09-18 RX ADMIN — LEVETIRACETAM 500 MG: 100 INJECTION, SOLUTION INTRAVENOUS at 08:50

## 2023-09-18 RX ADMIN — ATORVASTATIN CALCIUM 40 MG: 40 TABLET, FILM COATED ORAL at 21:05

## 2023-09-18 RX ADMIN — INSULIN LISPRO 2 UNITS: 100 INJECTION, SOLUTION INTRAVENOUS; SUBCUTANEOUS at 12:22

## 2023-09-18 RX ADMIN — DOCUSATE SODIUM 100 MG: 100 CAPSULE, LIQUID FILLED ORAL at 08:52

## 2023-09-18 RX ADMIN — SODIUM CHLORIDE 3 G: 9 INJECTION, SOLUTION INTRAVENOUS at 09:25

## 2023-09-18 RX ADMIN — LEVETIRACETAM 500 MG: 100 INJECTION, SOLUTION INTRAVENOUS at 21:02

## 2023-09-18 RX ADMIN — OFLOXACIN 5 DROP: 3 SOLUTION OPHTHALMIC at 21:06

## 2023-09-18 RX ADMIN — AMLODIPINE BESYLATE 5 MG: 5 TABLET ORAL at 17:22

## 2023-09-18 NOTE — PLAN OF CARE
Problem: MOBILITY - ADULT  Goal: Maintain or return to baseline ADL function  Description: INTERVENTIONS:  -  Assess patient's ability to carry out ADLs; assess patient's baseline for ADL function and identify physical deficits which impact ability to perform ADLs (bathing, care of mouth/teeth, toileting, grooming, dressing, etc.)  - Assess/evaluate cause of self-care deficits   - Assess range of motion  - Assess patient's mobility; develop plan if impaired  - Assess patient's need for assistive devices and provide as appropriate  - Encourage maximum independence but intervene and supervise when necessary  - Involve family in performance of ADLs  - Assess for home care needs following discharge   - Consider OT consult to assist with ADL evaluation and planning for discharge  - Provide patient education as appropriate  Outcome: Progressing  Goal: Maintains/Returns to pre admission functional level  Description: INTERVENTIONS:  - Perform BMAT or MOVE assessment daily.   - Set and communicate daily mobility goal to care team and patient/family/caregiver.    - Collaborate with rehabilitation services on mobility goals if consulted  - Record patient progress and toleration of activity level   Outcome: Progressing     Problem: PAIN - ADULT  Goal: Verbalizes/displays adequate comfort level or baseline comfort level  Description: Interventions:  - Encourage patient to monitor pain and request assistance  - Assess pain using appropriate pain scale  - Administer analgesics based on type and severity of pain and evaluate response  - Implement non-pharmacological measures as appropriate and evaluate response  - Consider cultural and social influences on pain and pain management  - Notify physician/advanced practitioner if interventions unsuccessful or patient reports new pain  Outcome: Progressing     Problem: INFECTION - ADULT  Goal: Absence or prevention of progression during hospitalization  Description: INTERVENTIONS:  - Assess and monitor for signs and symptoms of infection  - Monitor lab/diagnostic results  - Monitor all insertion sites, i.e. indwelling lines, tubes, and drains  - Monitor endotracheal if appropriate and nasal secretions for changes in amount and color  - [place] appropriate cooling/warming therapies per order  - Administer medications as ordered  - Instruct and encourage patient and family to use good hand hygiene technique  - Identify and instruct in appropriate isolation precautions for identified infection/condition  Outcome: Progressing     Problem: SAFETY ADULT  Goal: Maintain or return to baseline ADL function  Description: INTERVENTIONS:  -  Assess patient's ability to carry out ADLs; assess patient's baseline for ADL function and identify physical deficits which impact ability to perform ADLs (bathing, care of mouth/teeth, toileting, grooming, dressing, etc.)  - Assess/evaluate cause of self-care deficits   - Assess range of motion  - Assess patient's mobility; develop plan if impaired  - Assess patient's need for assistive devices and provide as appropriate  - Encourage maximum independence but intervene and supervise when necessary  - Involve family in performance of ADLs  - Assess for home care needs following discharge   - Consider OT consult to assist with ADL evaluation and planning for discharge  - Provide patient education as appropriate  Outcome: Progressing  Goal: Maintains/Returns to pre admission functional level  Description: INTERVENTIONS:  - Perform BMAT or MOVE assessment daily.   - Set and communicate daily mobility goal to care team and patient/family/caregiver.    - Collaborate with rehabilitation services on mobility goals if consulted  - Record patient progress and toleration of activity level   Outcome: Progressing  Goal: Patient will remain free of falls  Description: INTERVENTIONS:  - Educate patient/family on patient safety including physical limitations  - Instruct patient to call for assistance with activity   - Consult OT/PT to assist with strengthening/mobility   - Keep Call bell within reach  - Keep bed low and locked with side rails adjusted as appropriate  - Keep care items and personal belongings within reach  - Initiate and maintain comfort rounds  - Make Fall Risk Sign visible to staff  - Apply yellow socks and bracelet for high fall risk patients  - Consider moving patient to room near nurses station  Outcome: Progressing     Problem: DISCHARGE PLANNING  Goal: Discharge to home or other facility with appropriate resources  Description: INTERVENTIONS:  - Identify barriers to discharge w/patient and caregiver  - Arrange for needed discharge resources and transportation as appropriate  - Identify discharge learning needs (meds, wound care, etc.)  - Arrange for interpretive services to assist at discharge as needed  - Refer to Case Management Department for coordinating discharge planning if the patient needs post-hospital services based on physician/advanced practitioner order or complex needs related to functional status, cognitive ability, or social support system  Outcome: Progressing     Problem: Knowledge Deficit  Goal: Patient/family/caregiver demonstrates understanding of disease process, treatment plan, medications, and discharge instructions  Description: Complete learning assessment and assess knowledge base.   Interventions:  - Provide teaching at level of understanding  - Provide teaching via preferred learning methods  Outcome: Progressing     Problem: Prexisting or High Potential for Compromised Skin Integrity  Goal: Skin integrity is maintained or improved  Description: INTERVENTIONS:  - Identify patients at risk for skin breakdown  - Assess and monitor skin integrity  - Assess and monitor nutrition and hydration status  - Monitor labs   - Assess for incontinence   - Turn and reposition patient  - Assist with mobility/ambulation  - Relieve pressure over bony prominences  - Avoid friction and shearing  - Provide appropriate hygiene as needed including keeping skin clean and dry  - Evaluate need for skin moisturizer/barrier cream  - Collaborate with interdisciplinary team   - Patient/family teaching  - Consider wound care consult   Outcome: Progressing

## 2023-09-18 NOTE — PROGRESS NOTES
NEPHROLOGY PROGRESS NOTE   Everette Verma 80 y.o. male MRN: [id number]  Unit/Bed#: [facility] 603-01 Encounter: [id number]      ASSESSMENT/PLAN:  1. CKD stage IV: Baseline creatinine 2.5-2.8 and follows with Dr. Quynh Hawkins.  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 Status post fall complicated by temporal bone fracture, subdural hematoma and subarachnoid hemorrhage  3. Hypertension: BP above goal  · Consider restarting home losartan  4. Secondary hyperparathyroidism  · On outpatient calcitriol 0.25mcg twice a week  5. Chronic CHF with an EF 50%  · On torsemide 20 mg every other day as an outpatient    Plan Summary:   • Consider restarting home losartan and torsemide if no plans for upcoming CT with contrast  • Ok to d/c from renal standpoint when cleared by primary team     SUBJECTIVE:  Feeling okay. Denies chest pain or shortness of breath.     OBJECTIVE:  Current Weight: Weight - Scale: 83.5 kg (184 lb)  Vitals:    09/18/23 0620   BP: (!) 178/86   Pulse: 67   Resp: 18   Temp: 98.4 °F (36.9 °C)   SpO2: 96%       Intake/Output Summary (Last 24 hours) at 9/18/2023 4173  Last data filed at 9/18/2023 0501  Gross per 24 hour   Intake 400 ml   Output 1650 ml   Net -1250 ml       General:  appears comfortable and in no acute distress   Skin:  No rash  Eyes:  Sclerae anicteric, no periorbital edema   ENT:  Moist mucous membranes  Neck:  Trachea midline, symmetric   Chest:  Clear to auscultation bilaterally with no wheezes, rales or rhonchi  CVS:  Regular rate and rhythm  Abdomen:  Soft, nontender, nondistended  Neuro:  Awake and alert  Psych:  Appropriate affect  Extremities: no lower extremity edema   : sweet in place with clear urine output     Medications:  Scheduled Meds:  Current Facility-Administered Medications   Medication Dose Route Frequency Provider Last Rate   • acetaminophen  975 mg Oral [facility] [address]  Tuesday  LUKE Robertson     • ampicillin-sulbactam  3 g Intravenous Q12H Maribel Haley MD 3 g (09/18/23 7237)   • atorvastatin  40 mg Oral HS Maribel Haley MD     • citalopram  20 mg Oral Daily Maribel Haley MD     • docusate sodium  100 mg Oral BID Junior Leah MD     • enoxaparin  30 mg Subcutaneous Q24H [address] [facility] DAYRON Gandhi     • HYDROmorphone  0.2 mg Intravenous Q6H PRN Maribel Haley MD     • insulin lispro  1-6 Units Subcutaneous TID [facility] Maribel Haley MD     • levETIRAcetam  500 mg Intravenous Q12H [address]  Reena Gandhi PA-C 500 mg (09/18/23 0850)   • ofloxacin  5 drop Right Eye 4x Daily Gagandeep Mckenna DO     • ondansetron  4 mg Intravenous Q6H PRN Fan Greene PA-C     • oxyCODONE  5 mg Oral Q8H PRN Maribel Haley MD     • oxyCODONE  2.5 mg Oral Q8H PRN Maribel Haley MD     • pantoprazole  40 mg Oral Early Morning Maribel Haley MD     • polyethylene glycol  17 g Oral Daily PRN Tuesday M LUKE Robertson         PRN Meds:.•  HYDROmorphone  •  ondansetron  •  oxyCODONE  •  oxyCODONE  •  polyethylene glycol    Laboratory Results:  Results from last 7 days   Lab Units 09/18/23  0525 09/17/23  0446 09/15/23  1306   WBC Thousand/uL  --   --  15.04*   HEMOGLOBIN g/dL  --   --  10.3*   HEMATOCRIT %  --   --  31.4*   PLATELETS Thousands/uL  --   --  233   SODIUM mmol/L 135 136 134*   POTASSIUM mmol/L 3.9 3.9 3.8   CHLORIDE mmol/L 106 107 103   CO2 mmol/L 21 20* 16*   BUN mg/dL 40* 44* 57*   CREATININE mg/dL 2.23* 2.45* 2.28*   CALCIUM mg/dL 8.3* 8.1* 8.5

## 2023-09-18 NOTE — TELEPHONE ENCOUNTER
Spoke with spouse, she reports patient is not being d/c this week. She reports he will also need rehab, as he hasn't walked at all. She is agreeable to cancel all appointments this week. She was provided my direct number to callback if there is any issues with his f/u appointment for next week. BE Clerical: Please cancel PET CT   BE INFUSION: Please cancel treatment this week.

## 2023-09-18 NOTE — TELEPHONE ENCOUNTER
Patient Call    Who are you speaking with? wife   If it is not the patient, are they listed on an active communication consent form? yes   What is the reason for this call? Devan Ramachandran, hospitalized, fractured skull, brain bleed, what to do with coming appointments? Does this require a call back? yes   If a call back is required, please list best call back number [phone]   If a call back is required, advise that a message will be forwarded to their care team and someone will return their call as soon as possible. Did you relay this information to the patient?  yes

## 2023-09-18 NOTE — CASE MANAGEMENT
Case Management Discharge Planning Note    Patient name Rosalva Ocampo  Location [facility] 603/[facility] 655-94 MRN [id number]  : 1940 Date 2023       Current Admission Date: 9/15/2023  Current Admission Diagnosis:Open fracture of temporal bone [facility])   Patient Active Problem List    Diagnosis Date Noted   • Open fracture of temporal bone ([address]) 09/15/2023   • SDH (subdural hematoma) ([address]) 09/15/2023   • Fall 09/15/2023   • SAH (subarachnoid hemorrhage) ([address]) 09/15/2023   • Pneumocephalus 09/15/2023   • Hyponatremia 2023   • Pelvic lymphadenopathy 2023   • Chronic diastolic CHF (congestive heart failure) ([address]) 2023   • Rib pain on left side 2023   • Left inguinal pain 2023   • Chemotherapy-induced thrombocytopenia 02/15/2023   • Shortness of breath 2022   • Anemia due to stage 4 chronic kidney disease ([address]) 2022   • Stage 4 chronic kidney disease ([address]) 08/10/2022   • Secondary hyperparathyroidism of renal origin ([address]) 2022   • Urethral tumor 2022   • Functional diarrhea 2022   • Platelets decreased ([address]) 2022   • Visual hallucinations 2021   • Chronic kidney disease-mineral and bone disorder 10/24/2021   • Benign hypertension with chronic kidney disease, stage IV ([address]) 2021   • Persistent proteinuria 2021   • Anemia 2021   • RBBB 2021   • Hypomagnesemia    • Metabolic acidosis    • Severe protein-calorie malnutrition ([address]) 2021   • Right sided Pelvic abscess in male [facility]) 2021   • Ambulatory dysfunction 2021   • Frequent falls 2021   • Anxiety and depression 2020   • Overweight 2020   • Fungal dermatitis 2020   • Forearm mass, left 2020   • Elevated troponin 2020   • Liver function study, abnormal 2020   • Malignant neoplasm of urinary bladder neck ([address]) 2020   • Positive urinary cytology 2019   • Coronary artery disease involving native coronary artery of native heart without angina pectoris 09/09/2019   • Ischemic cardiomyopathy 09/09/2019   • History of bladder cancer 07/30/2019   • Closed fracture of upper end of right fibula 03/11/2019   • Malignant neoplasm of overlapping sites of bladder ([address]) 01/10/2019   • 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/2019   • Type 2 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 ([address]) 12/05/2018   • Bladder tumor 11/01/2018   • Overactive bladder 10/10/2018   • 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   • Backache 10/21/2013   • Arteriosclerotic cardiovascular disease 10/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 [place] [address]  Phone: [phone] Fax: [phone]    [address], [address] [place] [address]  Phone: [phone] Fax: [phone]    Primary Care Provider: Marilyn Bolden DO    Primary Insurance: MEDICARE  Secondary Insurance: BLUE CROSS    DISCHARGE DETAILS:    Discharge planning discussed with[de-identified] Patient  Freedom of Choice: Yes  Comments - Freedom of Choice: discussed fOC  CM contacted family/caregiver?: Yes  Were Treatment Team discharge recommendations reviewed with patient/caregiver?: Yes     Were patient/caregiver advised of the risks associated with not following Treatment Team discharge recommendations?: Yes    Contacts  Patient Contacts: Zara Perkins  Relationship to Patient[de-identified] Family  Contact Method:  In Person  Reason/Outcome: Continuity of Care, Emergency Contact, Discharge Planning    Other Referral/Resources/Interventions Provided:  Interventions: Short Term Rehab  Referral Comments: Wife at bedside, this CM spoke with patient and wife, regarding STR. Patient understands that therapy is rec rehab, and is now agreeable. Patient requesting referral to [facility]- would like to stay at [facility].   [facility] if [facility] unable to accept, referrals entered in Cynthia Reyna

## 2023-09-18 NOTE — TELEPHONE ENCOUNTER
09/21/2023-CALLED [address] [place] [address] [phone], SPOKE TO PRANEETH , CONFIRMED  10/03/2023 APT IN [place] AND TO HAVE CT HEAD DONE PRIOR.     **WAITING FOR CT HEAD TO BE SCHEDULED**        09/18/2023-PT STILL IN HOSPITAL  10/03/2023 APT W/CT HEAD    09/16/2023-Lalo Dukes PA-C   2 weeks with CTH, ap solo please

## 2023-09-18 NOTE — PROGRESS NOTES
[address] [facility]  Progress Note  Name: Kerrie Monroe  MRN: [id number]  [facility] I Date of Admission: 9/15/2023   Date of Service: 9/18/2023 I Hospital Day: 3    Assessment/Plan   Fall  Assessment & Plan  - Status post fall with noted injuries. - Fall precautions. - Geriatric Medicine consultation for evaluation, medication review and recommendations.  - PT and OT evaluation and treatment as indicated. - Case Management consultation for disposition planning. Open fracture of temporal bone [facility])  Assessment & Plan  Temporal bone fracture noted on CT 9/15, with associated subdural hematoma, subarachnoid hemorrhage. Noted continued bleeding from right ear s/p administration of afrin  - Neuro exam: stable  - Continue neuro checks  - Appreciate neurosurgery and ENT recommendations  - Unasyn for treatment of open fracture  - Complete 7 day course of Keppra for seizure prophylaxis  - Chemical DVT prophylaxis: cleared by NSx 9/17/23  - PT/OT and cognitive evaluation  - Consider CTV/CTA per neurosurgery recommendation given patient has CKD4.  Consider getting repeat non con CT head, nephro recs appreciated, following creatinine, plan for non con CT head until nephro clearance for CTV        Elevated troponin  Assessment & Plan  - troponin peaked at  458 9/16/23  - no active chest pain   - EKG shows no ischemia  - Cards consult, appreciate recs; non-MI troponin elevation, no intervention required  - ECHO performed: EF 54% (see details of report 9/15/23)    Pneumocephalus  Assessment & Plan  See above    SAH (subarachnoid hemorrhage) ([address])  Assessment & Plan  See above    SDH (subdural hematoma) ([facility])  Assessment & Plan  See above    Chronic kidney disease-mineral and bone disorder  Assessment & Plan  Lab Results   Component Value Date    EGFR 23 09/17/2023    EGFR 25 09/15/2023    EGFR 22 09/05/2023    CREATININE 2.45 (H) 09/17/2023    CREATININE 2.28 (H) 09/15/2023 CREATININE 2.50 (H) 09/05/2023     · nephro recs appreciated             Bowel Regimen: Colace  VTE Prophylaxis:Enoxaparin (Lovenox)     Disposition: Has been to [facility] in past    Subjective   Chief Complaint:    Subjective: patient slept well overnight. No complaints of headache, nausea. Objective   Vitals:   Temp:  [97.7 °F (36.5 °C)-98.9 °F (37.2 °C)] 98.9 °F (37.2 °C)  HR:  [67-68] 67  Resp:  [19-21] 21  BP: (145-167)/(59-79) 152/64    I/O       09/16 0701  09/17 0700 09/17 0701  09/18 0700    P. O. 0 580    IV Piggyback  700    Total Intake(mL/kg) 0 (0) 1280 (15.3)    Urine (mL/kg/hr) 850 (0.4) 750 (0.4)    Emesis/NG output 300     Total Output 1150 750    Net -1150 +530                 Physical Exam:   GENERAL APPEARANCE: NAD  NEURO: Patient is speaking clearly in complete sentences. Patient is answering appropriately and able follow commands. Patient is moving all four extremities spontaneously. No facial droop. Tongue midline. HEENT: PERRL, Moist mucous membranes, external ears normal, nose normal  Neck: No cervical adenopathy  CV: RRR. No murmurs, rubs, gallops  LUNGS: Clear to auscultation: No wheezes, stridor, rhonchi, rales  GI: Abdomen non-distended. Soft. Non-tender and without rebound or guarding   : Deferred at this time  MSK: No deformity. Skin: Warm and dry  Capillary refill: <2 seconds      Invasive Devices     Peripheral Intravenous Line  Duration           Peripheral IV 09/15/23 Left;Proximal;Ventral (anterior) Forearm 2 days          Drain  Duration           Urostomy -- days    Urostomy Ureterostomy right RLQ 2 days                      Lab Results: Results: I have personally reviewed all pertinent laboratory/tests results  Imaging: I have personally reviewed pertinent reports.

## 2023-09-18 NOTE — CONSULTS
Consultation - Geriatrics   Nora Cook 80 y.o. male MRN: [id number]  Unit/Bed#: [facility] 603-01 Encounter: [id number]      Assessment/Plan    Ambulatory dysfunction  At risk for falls secondary to age, hx of fall, gait instability, polypharmacy, visual impairment  Fall precautions  PT/OT  Increased physical exercise, recommend balance and strengthening exercises  Rehab post hospitalization     Acute pain due to trauma  Geriatric pain protocol  Scheduled acetaminophen 975 mg po Q8  Oxycodone 2.5 mg po Q 4 prn moderate pain  Oxycodone 5 mg po Q 4 prn severe pain   Monitor for constipation  Lidoderm patch      Frailty  Clinical Frail Scale: 5- Mildly Frail  More evident slowing, needs help high order IADLs (transport, bills, medications)  Albumin 3.4  PT/OT  Continue supportive care     Cognitive screening  No reported prior memory issues  TSH 2.845 (9/5/23)  Recommend check  vitamin B12  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  Keep physically, mentally and socially active    Depression  Chronically on celexa 20 mg po daily  Monitor NA, may cause hyponatremia  Additionally prescribed NACL 1300 mg po BID at home    Hyponatremia  In the setting of CKD4, use of celexa  Follows with nephrology as outpatient  Monitor NA    Delirium precautions  Baseline: alert and oriented x 3  Provide frequent redirection, reorientation, distraction techniques  Avoid deliriogenic medications such as tramadol, benzodiazepines, anticholinergics,  Benadryl  Treat pain, See geriatric pain protocol  Monitor for constipation and urinary retention  Encourage early and frequent moblization, OOB  Encourage Hydration/ Nutrition  Implement sleep hygiene, limit night time interuptions, group activities     Insomnia  Related to hospitalization  First line is behavioral therapy  Avoid sedative hypnotics such as benzodiazepines and benadryl  Encourage staying awake during the day  Encourage daytime activity, morning exercise  Decrease or eliminate day time naps  Avoid caffeine especially during late afternoon and evening hours  Establish a night time routine    Open fracture of temporal bone with SDH  S/p fall  ENT and neurosurgery on consult  Unasyn for open fx  keppra for seizure ppx  PT/OT cog eval    Elevated TPN  TPN (9/16/23)  Cardiology on consult    Advanced Care Planning  Full code    Home medication review  [facility] pharmacy  Tramadol 50 mg po prn, last refill 7/30/23  Omeprazole 40 mg po daily, last refill 9/16/23  Citalopram 20 mg po daily, last refill 8/26/23  calcitrol 0.25 mg po 2x weekly, last refill 7/21/23 # 84 days  Atorvastatin 40 mg po Q evening, last refill 8/18/23  Sodium Bicarb 1300 mg po  BID, last refill 8/11/23  Metoprolol 25 mg po BID, last refill 8/11/23  farxiga 5 mg po daily, last refill 6/30/23 # 90 days  Mag oxide 400 mg po daily    Torsemide 20 mg po daily, last refill 4/18/23 # 90 days             History of Present Illness   Physician Requesting Consult: Gino Salazar MD  Reason for Consult / Principal Problem: ISAR greater than 2  Hx and PE limited by: NA  HPI: Roshan Salas is a 80y.o. year old male who presents with after a fall. He has moving something onto his truck, fell backwards, hitting his head on the ground. He has anemia, arthritis, CKD4, PTH, CAD, BPH, DM2, GERD, macular degeneration, hx of uretheral tumor. Prior to arrival pt lives at home wit his wife. His wife drives. He needs assistance with IADLs. He is independent with ADLs. He uses a walker and cane for ambulation. He wears glasses. He has macular degeneration. He has mild hearing loss. He has good dentition. He reports feeling constipated    Upon exam pt is in bed, He is alert and oriented x 4. He reports his last BM was Friday. Consults    Review of Systems   Constitutional: Negative for unexpected weight change. HENT: Positive for hearing loss. Eyes: Positive for visual disturbance.         Macular degeneration, sees shadows Respiratory: Negative for cough. Cardiovascular: Negative for chest pain. Gastrointestinal: Positive for constipation. Genitourinary:        Incontinence   Musculoskeletal: Positive for gait problem. Neurological: Negative for weakness. Psychiatric/Behavioral: Negative for sleep disturbance.        Historical Information   Past Medical History:   Diagnosis Date   • Acute kidney injury ([address])    • Anemia Jan. 2022   • Arthritis     Hands   • Wright esophagus    • BPH with obstruction/lower urinary tract symptoms    • CAD (coronary artery disease)     Last assessed 09/16/15   • Cancer ([address])     bladder   • Cataract, acquired     Last assessed 10/10/17   • Chronic kidney disease    • Coronary artery disease    • Diabetes mellitus ([address])     NIDDM   • Diabetic neuropathy ([address])     Feet   • Enlarged prostate with lower urinary tract symptoms (LUTS)     Last assessed 10/10/17   • Erectile dysfunction    • GERD (gastroesophageal reflux disease)     Last assessed 10/10/17   • Hemoptysis     Last assessed 03/14/16   • Hypercholesterolemia     Last assessed 10/10/17   • Hypertension     Last assessed 10/10/17   • Kidney stone    • Macular degeneration     Right eye is particularly affected-peripheral vision intact   • Myocardial infarction [facility] 1998   • OAB (overactive bladder)    • Testicular hypofunction    • Testicular hypogonadism     Last assessed 10/10/17   • Tinnitus    • Umbilical hernia     Last assessed 06/18/14   • Urge incontinence of urine    • Wears glasses      Past Surgical History:   Procedure Laterality Date   • BLADDER SURGERY     • COLONOSCOPY     • CORONARY ARTERY BYPASS GRAFT  07/16/2014    ABG x 4 LIMA to LAD,SVG to diagnoal 2 SVG to OM-1, SVG to PDA, resection of partial plerual mass   • CT GUIDED [facility] DRAINAGE CATHETER PLACEMENT  02/19/2021   • CYSTOSCOPY  2013   • CYSTOSCOPY  02/08/2022    [facility]   • ESOPHAGOGASTRODUODENOSCOPY     • FL RETROGRADE PYELOGRAM  12/20/2018   • FL RETROGRADE PYELOGRAM  12/05/2019   • INGUINAL HERNIA REPAIR Bilateral 2015   • IR DRAINAGE TUBE CHECK AND/OR REMOVAL  03/05/2021   • PHOTODYNAMIC THERAPY      For Barretts esophagus   • CO COLONOSCOPY FLX DX W/COLLJ SPEC WHEN PFRMD N/A 04/25/2016    Procedure: COLONOSCOPY;  Surgeon: Piper Gonzalez MD;  Location:  GI LAB; Service: Gastroenterology   • CO CYSTO W/REMOVAL OF LESIONS SMALL N/A 12/05/2019    Procedure: CYSTO W/TURBT AND TRANSURETHRAL PROSTATE BIOPSY AND OPENING OF BLADDER NECK CONTRACTURE, B/L Retrograde pyelogram;  Surgeon: Tee March MD;  Location: [facility];  Service: Urology   • CO CYSTOURETHROSCOPY W/DEST &/RMVL MED BLADDER TY N/A 04/16/2020    Procedure: CYSTOSCOPY, TRANSURETHRAL RESECTION OF BLADDER TUMOR (TURBT);   Surgeon: Tee March MD;  Location[facility];  Service: Urology   • CO CYSTOURETHROSCOPY WITH BIOPSY N/A 09/10/2020    Procedure: CYSTOSCOPY, COLLECTION OF LEFT KIDNEY CYTOLOGY, BILATERAL RETROGRADE PYELOGRAM, BLADDER WALL BIOPSIES  AND FULGERAION, RANDOM BLADDER TUMOR BIOPSIES;  Surgeon: Tee March MD;  Location: [address] [place] [address] MAIN OR;  Service: Urology   • CO CYSTOURETHROSCOPY WITH BIOPSY N/A 04/05/2022    Procedure: urethroscopy , biopsy of urethra with fulgeration;  Surgeon: Bairon Dickson MD;  Location:  MAIN OR;  Service: Urology   • PROSTATE SURGERY     • TONSILLECTOMY     • TRANSURETHRAL RESECTION OF PROSTATE N/A 12/20/2018    Procedure: CYSTO, PHOTO SELECTIVE VAPORIZATION OF PROSTATE, B/L RETROGRADE PYELOGRAM, TURBT;  Surgeon: Tee March MD;  Location: [facility];  Service: Urology   • UMBILICAL HERNIA REPAIR  2012   • UPPER GASTROINTESTINAL ENDOSCOPY       Social History   Social History     Substance and Sexual Activity   Alcohol Use Not Currently   • Alcohol/week: 2.0 standard drinks of alcohol   • Types: 1 Glasses of wine, 1 Cans of beer per week    Comment: Non since 7/15/2020     Social History     Substance and Sexual Activity   Drug Use Never     Social History     Tobacco Use   Smoking Status Former   • Packs/day: 1.00   • Years: 10.00   • Total pack years: 10.00   • Types: Cigarettes   • Quit date: 1972   • Years since quittin.7   Smokeless Tobacco Never   Tobacco Comments    Quit at age 28         Family History:   Family History   Problem Relation Age of Onset   • Cancer Mother         Topical oral abrasian caused cancer   • Other Mother         Digestive System Complications   • Diabetes Father    • Heart disease Father    • Hypertension Father    • Coronary artery disease Father    • Hyperlipidemia Father        Meds/Allergies   Current meds:   Current Facility-Administered Medications   Medication Dose Route Frequency   • acetaminophen (TYLENOL) tablet 975 mg  975 mg Oral Q8H [address]   • ampicillin-sulbactam (UNASYN) 3 g in sodium chloride 0.9 % 100 mL IVPB  3 g Intravenous Q12H   • atorvastatin (LIPITOR) tablet 40 mg  40 mg Oral HS   • citalopram (CeleXA) tablet 20 mg  20 mg Oral Daily   • docusate sodium (COLACE) capsule 100 mg  100 mg Oral BID   • enoxaparin (LOVENOX) subcutaneous injection 30 mg  30 mg Subcutaneous Q24H MARIA LUZ   • HYDROmorphone HCl (DILAUDID) injection 0.2 mg  0.2 mg Intravenous Q6H PRN   • insulin lispro (HumaLOG) 100 units/mL subcutaneous injection 1-6 Units  1-6 Units Subcutaneous TID AC   • levETIRAcetam (KEPPRA) 500 mg in sodium chloride 0.9 % 100 mL IVPB  500 mg Intravenous Q12H MARIA LUZ   • ofloxacin (OCUFLOX) 0.3 % ophthalmic solution 5 drop  5 drop Right Eye 4x Daily   • ondansetron (ZOFRAN) injection 4 mg  4 mg Intravenous Q6H PRN   • oxyCODONE (ROXICODONE) IR tablet 5 mg  5 mg Oral Q8H PRN   • oxyCODONE (ROXICODONE) split tablet 2.5 mg  2.5 mg Oral Q8H PRN   • pantoprazole (PROTONIX) EC tablet 40 mg  40 mg Oral Early Morning   • polyethylene glycol (MIRALAX) packet 17 g  17 g Oral Daily PRN           Allergies   Allergen Reactions   • Fentanyl Hallucinations   • Morphine And Related Other (See Comments)     While having an MI patient received morphine and had adverse reaction but doesn't know what happened. Objective   Vitals: Blood pressure (!) 178/86, pulse 67, temperature 98.4 °F (36.9 °C), resp. rate 18, height 5' 7" (1.702 m), weight 83.5 kg (184 lb), SpO2 96 %. ,Body mass index is 28.82 kg/m². Physical Exam  Vitals and nursing note reviewed. HENT:      Head: Normocephalic. Nose: No congestion. Mouth/Throat:      Mouth: Mucous membranes are moist.   Eyes:      General:         Right eye: No discharge. Left eye: No discharge. Cardiovascular:      Rate and Rhythm: Normal rate and regular rhythm. Pulses: Normal pulses. Heart sounds: Normal heart sounds. Pulmonary:      Effort: Pulmonary effort is normal.      Breath sounds: Normal breath sounds. Abdominal:      General: Bowel sounds are normal.      Palpations: Abdomen is soft. Musculoskeletal:         General: Normal range of motion. Cervical back: Normal range of motion. Skin:     General: Skin is warm and dry. Findings: Bruising present. Neurological:      Mental Status: He is alert and oriented to person, place, and time. Mental status is at baseline. Psychiatric:         Mood and Affect: Mood normal.         Lab Results:   Results from last 7 days   Lab Units 09/15/23  1306   WBC Thousand/uL 15.04*   HEMOGLOBIN g/dL 10.3*   HEMATOCRIT % 31.4*   PLATELETS Thousands/uL 233        Results from last 7 days   Lab Units 09/18/23  0525 09/17/23  0446 09/15/23  1306   POTASSIUM mmol/L 3.9   < > 3.8   CHLORIDE mmol/L 106   < > 103   CO2 mmol/L 21   < > 16*   BUN mg/dL 40*   < > 57*   CREATININE mg/dL 2.23*   < > 2.28*   CALCIUM mg/dL 8.3*   < > 8.5   ALK PHOS U/L  --   --  97   ALT U/L  --   --  15   AST U/L  --   --  17    < > = values in this interval not displayed. Imaging Studies: I have personally reviewed pertinent reports. EKG, Pathology, and Other Studies: I have personally reviewed pertinent reports.     VTE Prophylaxis: Sequential compression device (Venodyne)     Code Status: Level 1 - Full Code

## 2023-09-19 ENCOUNTER — HOSPITAL ENCOUNTER (INPATIENT)
Facility: HOSPITAL | Age: 83
LOS: 10 days | Discharge: HOME HEALTH | DRG: 949 | End: 2023-09-29
Payer: MEDICARE

## 2023-09-19 ENCOUNTER — HOSPITAL ENCOUNTER (OUTPATIENT)
Dept: RADIOLOGY | Age: 83
Discharge: HOME | End: 2023-09-19

## 2023-09-19 VITALS
SYSTOLIC BLOOD PRESSURE: 120 MMHG | OXYGEN SATURATION: 94 % | TEMPERATURE: 97.9 F | RESPIRATION RATE: 17 BRPM | HEIGHT: 67 IN | WEIGHT: 184 LBS | DIASTOLIC BLOOD PRESSURE: 70 MMHG | BODY MASS INDEX: 28.88 KG/M2 | HEART RATE: 67 BPM

## 2023-09-19 DIAGNOSIS — H91.90 HEARING LOSS: ICD-10-CM

## 2023-09-19 DIAGNOSIS — I48.92 ATRIAL FLUTTER (HCC): ICD-10-CM

## 2023-09-19 DIAGNOSIS — D64.9 ANEMIA: ICD-10-CM

## 2023-09-19 DIAGNOSIS — E11.9 DMII (DIABETES MELLITUS, TYPE 2) (HCC): ICD-10-CM

## 2023-09-19 DIAGNOSIS — N18.4 BENIGN HYPERTENSION WITH CHRONIC KIDNEY DISEASE, STAGE IV (HCC): ICD-10-CM

## 2023-09-19 DIAGNOSIS — S02.19XA: ICD-10-CM

## 2023-09-19 DIAGNOSIS — I12.9 BENIGN HYPERTENSION WITH CHRONIC KIDNEY DISEASE, STAGE IV (HCC): ICD-10-CM

## 2023-09-19 DIAGNOSIS — G47.00 INSOMNIA: ICD-10-CM

## 2023-09-19 DIAGNOSIS — S51.811A SKIN TEAR OF RIGHT FOREARM WITHOUT COMPLICATION: ICD-10-CM

## 2023-09-19 DIAGNOSIS — K59.00 CONSTIPATION: ICD-10-CM

## 2023-09-19 DIAGNOSIS — T78.40XA ALLERGIES: ICD-10-CM

## 2023-09-19 DIAGNOSIS — N18.4 STAGE 4 CHRONIC KIDNEY DISEASE (HCC): Primary | ICD-10-CM

## 2023-09-19 DIAGNOSIS — R52 PAIN: ICD-10-CM

## 2023-09-19 DIAGNOSIS — S06.5XAA SDH (SUBDURAL HEMATOMA) (HCC): ICD-10-CM

## 2023-09-19 DIAGNOSIS — S02.19XA TEMPORAL BONE FRACTURE (HCC): ICD-10-CM

## 2023-09-19 PROBLEM — K21.9 GERD (GASTROESOPHAGEAL REFLUX DISEASE): Status: ACTIVE | Noted: 2023-09-19

## 2023-09-19 PROBLEM — R93.89 ABNORMAL FINDINGS ON IMAGING TEST: Status: ACTIVE | Noted: 2023-09-19

## 2023-09-19 PROBLEM — Z86.73 HISTORY OF STROKE: Status: ACTIVE | Noted: 2023-09-19

## 2023-09-19 PROBLEM — D72.829 LEUKOCYTOSIS: Status: ACTIVE | Noted: 2023-09-19

## 2023-09-19 LAB
ANION GAP SERPL CALCULATED.3IONS-SCNC: 10 MMOL/L
BUN SERPL-MCNC: 36 MG/DL (ref 5–25)
CALCIUM SERPL-MCNC: 7.9 MG/DL (ref 8.4–10.2)
CHLORIDE SERPL-SCNC: 108 MMOL/L (ref 96–108)
CO2 SERPL-SCNC: 18 MMOL/L (ref 21–32)
CREAT SERPL-MCNC: 2.29 MG/DL (ref 0.6–1.3)
GFR SERPL CREATININE-BSD FRML MDRD: 25 ML/MIN/1.73SQ M
GLUCOSE SERPL-MCNC: 109 MG/DL (ref 65–140)
GLUCOSE SERPL-MCNC: 113 MG/DL (ref 65–140)
GLUCOSE SERPL-MCNC: 117 MG/DL (ref 65–140)
GLUCOSE SERPL-MCNC: 138 MG/DL (ref 65–140)
POTASSIUM SERPL-SCNC: 3.7 MMOL/L (ref 3.5–5.3)
SARS-COV-2 RNA RESP QL NAA+PROBE: NEGATIVE
SODIUM SERPL-SCNC: 136 MMOL/L (ref 135–147)

## 2023-09-19 PROCEDURE — NC001 PR NO CHARGE

## 2023-09-19 PROCEDURE — 82948 REAGENT STRIP/BLOOD GLUCOSE: CPT

## 2023-09-19 PROCEDURE — 99233 SBSQ HOSP IP/OBS HIGH 50: CPT | Performed by: NURSE PRACTITIONER

## 2023-09-19 PROCEDURE — 99238 HOSP IP/OBS DSCHRG MGMT 30/<: CPT | Performed by: PHYSICIAN ASSISTANT

## 2023-09-19 PROCEDURE — 80048 BASIC METABOLIC PNL TOTAL CA: CPT | Performed by: INTERNAL MEDICINE

## 2023-09-19 PROCEDURE — 99232 SBSQ HOSP IP/OBS MODERATE 35: CPT | Performed by: INTERNAL MEDICINE

## 2023-09-19 PROCEDURE — NC001 PR NO CHARGE: Performed by: SURGERY

## 2023-09-19 PROCEDURE — 87635 SARS-COV-2 COVID-19 AMP PRB: CPT | Performed by: NURSE PRACTITIONER

## 2023-09-19 PROCEDURE — 99223 1ST HOSP IP/OBS HIGH 75: CPT

## 2023-09-19 RX ORDER — POLYETHYLENE GLYCOL 3350 17 G/17G
17 POWDER, FOR SOLUTION ORAL DAILY PRN
Refills: 0
Start: 2023-09-19 | End: 2023-09-29

## 2023-09-19 RX ORDER — POLYETHYLENE GLYCOL 3350 17 G/17G
17 POWDER, FOR SOLUTION ORAL ONCE
Status: COMPLETED | OUTPATIENT
Start: 2023-09-19 | End: 2023-09-19

## 2023-09-19 RX ORDER — OFLOXACIN 3 MG/ML
2 SOLUTION/ DROPS OPHTHALMIC 4 TIMES DAILY
Qty: 6 ML | Refills: 0
Start: 2023-09-19 | End: 2023-09-29

## 2023-09-19 RX ORDER — ONDANSETRON 4 MG/1
4 TABLET, ORALLY DISINTEGRATING ORAL EVERY 8 HOURS PRN
Status: DISCONTINUED | OUTPATIENT
Start: 2023-09-19 | End: 2023-09-21

## 2023-09-19 RX ORDER — BISACODYL 10 MG
10 SUPPOSITORY, RECTAL RECTAL DAILY PRN
Status: DISCONTINUED | OUTPATIENT
Start: 2023-09-19 | End: 2023-09-29 | Stop reason: HOSPADM

## 2023-09-19 RX ORDER — LEVETIRACETAM 500 MG/1
500 TABLET ORAL EVERY 12 HOURS SCHEDULED
Qty: 6 TABLET | Refills: 0
Start: 2023-09-19 | End: 2023-09-29

## 2023-09-19 RX ORDER — SODIUM BICARBONATE 650 MG/1
1300 TABLET ORAL
Status: DISCONTINUED | OUTPATIENT
Start: 2023-09-19 | End: 2023-09-29 | Stop reason: HOSPADM

## 2023-09-19 RX ORDER — CALCIUM CARBONATE 500 MG/1
500 TABLET, CHEWABLE ORAL DAILY PRN
Status: DISCONTINUED | OUTPATIENT
Start: 2023-09-19 | End: 2023-09-29 | Stop reason: HOSPADM

## 2023-09-19 RX ORDER — AMOXICILLIN 250 MG
1 CAPSULE ORAL
Status: DISCONTINUED | OUTPATIENT
Start: 2023-09-19 | End: 2023-09-19 | Stop reason: HOSPADM

## 2023-09-19 RX ORDER — ACETAMINOPHEN 325 MG/1
650 TABLET ORAL EVERY 6 HOURS PRN
Status: DISCONTINUED | OUTPATIENT
Start: 2023-09-19 | End: 2023-09-21

## 2023-09-19 RX ORDER — OXYCODONE HYDROCHLORIDE 5 MG/1
5 TABLET ORAL EVERY 8 HOURS PRN
Refills: 0 | Status: SHIPPED | OUTPATIENT
Start: 2023-09-19 | End: 2023-09-29

## 2023-09-19 RX ORDER — OFLOXACIN 3 MG/ML
5 SOLUTION/ DROPS OPHTHALMIC DAILY
Status: COMPLETED | OUTPATIENT
Start: 2023-09-19 | End: 2023-09-28

## 2023-09-19 RX ORDER — AMLODIPINE BESYLATE 5 MG/1
5 TABLET ORAL 2 TIMES DAILY
Status: DISCONTINUED | OUTPATIENT
Start: 2023-09-19 | End: 2023-09-29 | Stop reason: HOSPADM

## 2023-09-19 RX ORDER — AMLODIPINE BESYLATE 5 MG/1
5 TABLET ORAL DAILY
Status: DISCONTINUED | OUTPATIENT
Start: 2023-09-20 | End: 2023-09-19

## 2023-09-19 RX ORDER — ONDANSETRON 2 MG/ML
4 INJECTION INTRAMUSCULAR; INTRAVENOUS EVERY 6 HOURS PRN
Status: DISCONTINUED | OUTPATIENT
Start: 2023-09-19 | End: 2023-09-21

## 2023-09-19 RX ORDER — AMLODIPINE BESYLATE 5 MG/1
5 TABLET ORAL DAILY
Refills: 0
Start: 2023-09-20 | End: 2023-09-29

## 2023-09-19 RX ORDER — PANTOPRAZOLE SODIUM 40 MG/1
40 TABLET, DELAYED RELEASE ORAL
Status: DISCONTINUED | OUTPATIENT
Start: 2023-09-20 | End: 2023-09-29 | Stop reason: HOSPADM

## 2023-09-19 RX ORDER — ACETAMINOPHEN 325 MG/1
975 TABLET ORAL EVERY 8 HOURS SCHEDULED
Status: DISCONTINUED | OUTPATIENT
Start: 2023-09-19 | End: 2023-09-19

## 2023-09-19 RX ORDER — POLYETHYLENE GLYCOL 3350 17 G/17G
17 POWDER, FOR SOLUTION ORAL DAILY PRN
Status: DISCONTINUED | OUTPATIENT
Start: 2023-09-19 | End: 2023-09-20

## 2023-09-19 RX ORDER — HEPARIN SODIUM 5000 [USP'U]/ML
5000 INJECTION, SOLUTION INTRAVENOUS; SUBCUTANEOUS EVERY 8 HOURS SCHEDULED
Status: DISCONTINUED | OUTPATIENT
Start: 2023-09-20 | End: 2023-09-29 | Stop reason: HOSPADM

## 2023-09-19 RX ORDER — LEVETIRACETAM 500 MG/1
500 TABLET ORAL EVERY 12 HOURS SCHEDULED
Status: COMPLETED | OUTPATIENT
Start: 2023-09-19 | End: 2023-09-21

## 2023-09-19 RX ORDER — ATORVASTATIN CALCIUM 40 MG/1
40 TABLET, FILM COATED ORAL
Status: DISCONTINUED | OUTPATIENT
Start: 2023-09-19 | End: 2023-09-29 | Stop reason: HOSPADM

## 2023-09-19 RX ORDER — CITALOPRAM 20 MG/1
20 TABLET ORAL DAILY
Status: DISCONTINUED | OUTPATIENT
Start: 2023-09-20 | End: 2023-09-29 | Stop reason: HOSPADM

## 2023-09-19 RX ORDER — TORSEMIDE 20 MG/1
20 TABLET ORAL EVERY OTHER DAY
Status: DISCONTINUED | OUTPATIENT
Start: 2023-09-19 | End: 2023-09-19 | Stop reason: HOSPADM

## 2023-09-19 RX ORDER — SODIUM BICARBONATE 650 MG/1
1300 TABLET ORAL
Status: DISCONTINUED | OUTPATIENT
Start: 2023-09-19 | End: 2023-09-19 | Stop reason: HOSPADM

## 2023-09-19 RX ORDER — POLYETHYLENE GLYCOL 3350 17 G/17G
17 POWDER, FOR SOLUTION ORAL DAILY PRN
Status: DISCONTINUED | OUTPATIENT
Start: 2023-09-19 | End: 2023-09-29 | Stop reason: HOSPADM

## 2023-09-19 RX ORDER — TORSEMIDE 20 MG/1
20 TABLET ORAL EVERY OTHER DAY
Status: DISCONTINUED | OUTPATIENT
Start: 2023-09-21 | End: 2023-09-29 | Stop reason: HOSPADM

## 2023-09-19 RX ORDER — CEPHALEXIN 500 MG/1
500 CAPSULE ORAL EVERY 8 HOURS SCHEDULED
Qty: 9 CAPSULE | Refills: 0
Start: 2023-09-19 | End: 2023-09-29

## 2023-09-19 RX ORDER — AMOXICILLIN 250 MG
1 CAPSULE ORAL
Refills: 0
Start: 2023-09-19 | End: 2023-09-29

## 2023-09-19 RX ORDER — INSULIN LISPRO 100 [IU]/ML
1-6 INJECTION, SOLUTION INTRAVENOUS; SUBCUTANEOUS
Status: DISCONTINUED | OUTPATIENT
Start: 2023-09-19 | End: 2023-09-20

## 2023-09-19 RX ORDER — AMOXICILLIN 250 MG
1 CAPSULE ORAL
Status: DISCONTINUED | OUTPATIENT
Start: 2023-09-19 | End: 2023-09-20

## 2023-09-19 RX ORDER — HYDRALAZINE HYDROCHLORIDE 10 MG/1
10 TABLET, FILM COATED ORAL EVERY 8 HOURS PRN
Status: DISCONTINUED | OUTPATIENT
Start: 2023-09-19 | End: 2023-09-29 | Stop reason: HOSPADM

## 2023-09-19 RX ADMIN — AMLODIPINE BESYLATE 5 MG: 5 TABLET ORAL at 21:29

## 2023-09-19 RX ADMIN — POLYETHYLENE GLYCOL 3350 17 G: 17 POWDER, FOR SOLUTION ORAL at 21:35

## 2023-09-19 RX ADMIN — CITALOPRAM HYDROBROMIDE 20 MG: 20 TABLET ORAL at 09:14

## 2023-09-19 RX ADMIN — ATORVASTATIN CALCIUM 40 MG: 40 TABLET, FILM COATED ORAL at 21:29

## 2023-09-19 RX ADMIN — PANTOPRAZOLE SODIUM 40 MG: 40 TABLET, DELAYED RELEASE ORAL at 05:53

## 2023-09-19 RX ADMIN — METOPROLOL TARTRATE 12.5 MG: 25 TABLET, FILM COATED ORAL at 21:27

## 2023-09-19 RX ADMIN — POLYETHYLENE GLYCOL 3350 17 G: 17 POWDER, FOR SOLUTION ORAL at 12:25

## 2023-09-19 RX ADMIN — LEVETIRACETAM 500 MG: 500 TABLET, FILM COATED ORAL at 21:29

## 2023-09-19 RX ADMIN — SODIUM BICARBONATE 650 MG TABLET 1300 MG: at 16:54

## 2023-09-19 RX ADMIN — SENNOSIDES AND DOCUSATE SODIUM 1 TABLET: 50; 8.6 TABLET ORAL at 21:29

## 2023-09-19 RX ADMIN — ACETAMINOPHEN 975 MG: 325 TABLET, FILM COATED ORAL at 05:53

## 2023-09-19 RX ADMIN — SODIUM CHLORIDE 3 G: 9 INJECTION, SOLUTION INTRAVENOUS at 10:11

## 2023-09-19 RX ADMIN — TORSEMIDE 20 MG: 20 TABLET ORAL at 12:25

## 2023-09-19 RX ADMIN — ENOXAPARIN SODIUM 30 MG: 30 INJECTION SUBCUTANEOUS at 09:15

## 2023-09-19 RX ADMIN — ACETAMINOPHEN 650 MG: 325 TABLET ORAL at 16:52

## 2023-09-19 RX ADMIN — DOCUSATE SODIUM 100 MG: 100 CAPSULE, LIQUID FILLED ORAL at 09:14

## 2023-09-19 RX ADMIN — OFLOXACIN 5 DROP: 3 SOLUTION OPHTHALMIC at 16:54

## 2023-09-19 RX ADMIN — SODIUM BICARBONATE 650 MG TABLET 1300 MG: at 12:25

## 2023-09-19 RX ADMIN — CALCIUM CARBONATE (ANTACID) CHEW TAB 500 MG 500 MG: 500 CHEW TAB at 17:35

## 2023-09-19 RX ADMIN — AMLODIPINE BESYLATE 5 MG: 5 TABLET ORAL at 09:14

## 2023-09-19 RX ADMIN — LEVETIRACETAM 500 MG: 100 INJECTION, SOLUTION INTRAVENOUS at 09:18

## 2023-09-19 RX ADMIN — OFLOXACIN 5 DROP: 3 SOLUTION OPHTHALMIC at 12:25

## 2023-09-19 NOTE — PLAN OF CARE
Problem: MOBILITY - ADULT  Goal: Maintain or return to baseline ADL function  Description: INTERVENTIONS:  -  Assess patient's ability to carry out ADLs; assess patient's baseline for ADL function and identify physical deficits which impact ability to perform ADLs (bathing, care of mouth/teeth, toileting, grooming, dressing, etc.)  - Assess/evaluate cause of self-care deficits   - Assess range of motion  - Assess patient's mobility; develop plan if impaired  - Assess patient's need for assistive devices and provide as appropriate  - Encourage maximum independence but intervene and supervise when necessary  - Involve family in performance of ADLs  - Assess for home care needs following discharge   - Consider OT consult to assist with ADL evaluation and planning for discharge  - Provide patient education as appropriate  9/19/2023 1240 by Jocelynn Sheffield RN  Outcome: Adequate for Discharge  9/19/2023 0720 by Jocelynn Sheffield RN  Outcome: Progressing  Goal: Maintains/Returns to pre admission functional level  Description: INTERVENTIONS:  - Perform BMAT or MOVE assessment daily.   - Set and communicate daily mobility goal to care team and patient/family/caregiver.    - Collaborate with rehabilitation services on mobility goals if consulted  - Record patient progress and toleration of activity level   9/19/2023 1240 by Jocelynn Sheffield RN  Outcome: Adequate for Discharge  9/19/2023 0720 by Jocelynn Sheffield RN  Outcome: Progressing     Problem: PAIN - ADULT  Goal: Verbalizes/displays adequate comfort level or baseline comfort level  Description: Interventions:  - Encourage patient to monitor pain and request assistance  - Assess pain using appropriate pain scale  - Administer analgesics based on type and severity of pain and evaluate response  - Implement non-pharmacological measures as appropriate and evaluate response  - Consider cultural and social influences on pain and pain management  - Notify physician/advanced practitioner if interventions unsuccessful or patient reports new pain  9/19/2023 1240 by Julio Howard RN  Outcome: Adequate for Discharge  9/19/2023 0720 by Julio Howard RN  Outcome: Progressing     Problem: INFECTION - ADULT  Goal: Absence or prevention of progression during hospitalization  Description: INTERVENTIONS:  - Assess and monitor for signs and symptoms of infection  - Monitor lab/diagnostic results  - Monitor all insertion sites, i.e. indwelling lines, tubes, and drains  - Monitor endotracheal if appropriate and nasal secretions for changes in amount and color  - Sobieski appropriate cooling/warming therapies per order  - Administer medications as ordered  - Instruct and encourage patient and family to use good hand hygiene technique  - Identify and instruct in appropriate isolation precautions for identified infection/condition  9/19/2023 1240 by Julio Howard RN  Outcome: Adequate for Discharge  9/19/2023 0720 by Julio Howard RN  Outcome: Progressing     Problem: SAFETY ADULT  Goal: Maintain or return to baseline ADL function  Description: INTERVENTIONS:  -  Assess patient's ability to carry out ADLs; assess patient's baseline for ADL function and identify physical deficits which impact ability to perform ADLs (bathing, care of mouth/teeth, toileting, grooming, dressing, etc.)  - Assess/evaluate cause of self-care deficits   - Assess range of motion  - Assess patient's mobility; develop plan if impaired  - Assess patient's need for assistive devices and provide as appropriate  - Encourage maximum independence but intervene and supervise when necessary  - Involve family in performance of ADLs  - Assess for home care needs following discharge   - Consider OT consult to assist with ADL evaluation and planning for discharge  - Provide patient education as appropriate  9/19/2023 1240 by Julio Howard RN  Outcome: Adequate for Discharge  9/19/2023 0720 by Julio Howard RN  Outcome: Progressing  Goal: Maintains/Returns to pre admission functional level  Description: INTERVENTIONS:  - Perform BMAT or MOVE assessment daily.   - Set and communicate daily mobility goal to care team and patient/family/caregiver.    - Collaborate with rehabilitation services on mobility goals if consulted  - Record patient progress and toleration of activity level   9/19/2023 1240 by Rodrigo Stafford RN  Outcome: Adequate for Discharge  9/19/2023 0720 by Rodrigo Stafford RN  Outcome: Progressing  Goal: Patient will remain free of falls  Description: INTERVENTIONS:  - Educate patient/family on patient safety including physical limitations  - Instruct patient to call for assistance with activity   - Consult OT/PT to assist with strengthening/mobility   - Keep Call bell within reach  - Keep bed low and locked with side rails adjusted as appropriate  - Keep care items and personal belongings within reach  - Initiate and maintain comfort rounds  - Make Fall Risk Sign visible to staff  - Apply yellow socks and bracelet for high fall risk patients  - Consider moving patient to room near nurses station  9/19/2023 1240 by Rodrigo Stafford RN  Outcome: Adequate for Discharge  9/19/2023 0720 by Rodrigo Stafford RN  Outcome: Progressing     Problem: DISCHARGE PLANNING  Goal: Discharge to home or other facility with appropriate resources  Description: INTERVENTIONS:  - Identify barriers to discharge w/patient and caregiver  - Arrange for needed discharge resources and transportation as appropriate  - Identify discharge learning needs (meds, wound care, etc.)  - Arrange for interpretive services to assist at discharge as needed  - Refer to Case Management Department for coordinating discharge planning if the patient needs post-hospital services based on physician/advanced practitioner order or complex needs related to functional status, cognitive ability, or social support system  9/19/2023 1240 by Rodrigo Stafford RN  Outcome: Adequate for Discharge  9/19/2023 0720 by Jamal Omer RN  Outcome: Progressing     Problem: Knowledge Deficit  Goal: Patient/family/caregiver demonstrates understanding of disease process, treatment plan, medications, and discharge instructions  Description: Complete learning assessment and assess knowledge base. Interventions:  - Provide teaching at level of understanding  - Provide teaching via preferred learning methods  9/19/2023 1240 by Jamal Omer RN  Outcome: Adequate for Discharge  9/19/2023 0720 by Jamal Omer RN  Outcome: Progressing     Problem: Prexisting or High Potential for Compromised Skin Integrity  Goal: Skin integrity is maintained or improved  Description: INTERVENTIONS:  - Identify patients at risk for skin breakdown  - Assess and monitor skin integrity  - Assess and monitor nutrition and hydration status  - Monitor labs   - Assess for incontinence   - Turn and reposition patient  - Assist with mobility/ambulation  - Relieve pressure over bony prominences  - Avoid friction and shearing  - Provide appropriate hygiene as needed including keeping skin clean and dry  - Evaluate need for skin moisturizer/barrier cream  - Collaborate with interdisciplinary team   - Patient/family teaching  - Consider wound care consult   9/19/2023 1240 by Jamal Omer RN  Outcome: Adequate for Discharge  9/19/2023 0720 by Jamal Omer RN  Outcome: Progressing     Problem: Nutrition/Hydration-ADULT  Goal: Nutrient/Hydration intake appropriate for improving, restoring or maintaining nutritional needs  Description: Monitor and assess patient's nutrition/hydration status for malnutrition. Collaborate with interdisciplinary team and initiate plan and interventions as ordered. Monitor patient's weight and dietary intake as ordered or per policy. Utilize nutrition screening tool and intervene as necessary.  Determine patient's food preferences and provide high-protein, high-caloric foods as appropriate.      INTERVENTIONS:  - Monitor oral intake, urinary output, labs, and treatment plans  - Assess nutrition and hydration status and recommend course of action  - Evaluate amount of meals eaten  - Assist patient with eating if necessary   - Allow adequate time for meals  - Recommend/ encourage appropriate diets, oral nutritional supplements, and vitamin/mineral supplements  - Order, calculate, and assess calorie counts as needed  - Recommend, monitor, and adjust tube feedings and TPN/PPN based on assessed needs  - Assess need for intravenous fluids  - Provide specific nutrition/hydration education as appropriate  - Include patient/family/caregiver in decisions related to nutrition  9/19/2023 1240 by Jocelynn Sheffield RN  Outcome: Adequate for Discharge  9/19/2023 0720 by Jocelynn Sheffield RN  Outcome: Progressing

## 2023-09-19 NOTE — DISCHARGE SUMMARY
[address] [facility]  Discharge- Render Obey 1940, 80 y.o. male MRN: [id number]  Unit/Bed#: [facility] 603-01 Encounter: [id number]  Primary Care Provider: Latasha Lemos DO   Date and time admitted to hospital: 9/15/2023 12:24 PM    Pneumocephalus  Assessment & Plan  See above    SAH (subarachnoid hemorrhage) [facility])  Assessment & Plan  See above    Fall  Assessment & Plan  - Status post fall with noted injuries. - Fall precautions. - Geriatric Medicine consultation for evaluation, medication review and recommendations.  - PT and OT evaluation and treatment as indicated. - Case Management consultation for disposition planning. SDH (subdural hematoma) (HCC)  Assessment & Plan  -Repeat imaging stable     Elevated troponin  Assessment & Plan  - troponin peaked at  458 9/16/23  - no active chest pain   - EKG shows no ischemia  - Cards consult, appreciate recs; non-MI troponin elevation, no intervention required  - ECHO performed: EF 54% (see details of report 9/15/23)    * Open fracture of temporal bone [facility])  Assessment & Plan  Temporal bone fracture noted on CT 9/15, with associated subdural hematoma, subarachnoid hemorrhage. Auditory canal bleeding has resolved   - Neuro exam: stable  - Continue neuro checks  - Appreciate neurosurgery and ENT recommendations  - Continue Unasyn for treatment of open fracture  -  7 day course of Keppra for seizure prophylaxis  - Chemical DVT prophylaxis: cleared by NSx 9/17/23  - PT/OT and cognitive evaluation  - Consider CTV/CTA per neurosurgery recommendation given patient has CKD4.  Nephrology recs pre-hydration if CTV/CTA necessary                   Medical Problems     Resolved Problems  Date Reviewed: 9/17/2023   None         Admission Date:   Admission Orders (From admission, onward)     Ordered        09/15/23 1427  Inpatient Admission  Once                        Admitting Diagnosis: Subdural hemorrhage ([address]) [I62.00]  Pneumocephalus, traumatic [G93.89]  Temporal bone fracture ([address]) [S02.19XA]  Traumatic injury of head, initial encounter [S09.90XA]  Unspecified multiple injuries, initial encounter [T07. XXXA]    HPI: From H&P by  [facility] on 9/15: "Edel Schuler is a 80 y.o. male who presents after a fall. Patient was moving something into his truck and fell backwards, hitting his head on the ground. Patient denies loss of consciousness or vomiting, but has been nauseous since the head strike. Denies any other injuries"    Procedures Performed: No orders of the defined types were placed in this encounter. Summary of Hospital Course: Patient is an 28-year-old male with past medical history of metastatic bladder cancer resistant to treatment who presented on 9/15 after mechanical fall while loading something in his car. He was found to have open temporal bone fracture with bleeding from a laceration in his ear canal.  He was also found to have subarachnoid and subdural hemorrhages. Repeat CT scan of the head showed stability of his intracranial hemorrhages. Neurosurgery was consulted and he was cleared to start DVT prophylaxis. Patient was medically stable for discharge on 9/19. Review of admission CT scan showed new findings of soft tissue mass, hepatic lesions, peritoneal stippling consistent with metastatic bladder cancer new from last CAT scan in August 2023. Patient and wife were informed of these findings and will follow-up with oncology at rehab. Please see EMR for full details of hospitalization. Significant Findings, Care, Treatment and Services Provided: Open temporal bone fracture with pneumocephalus ENT consultation, neurosurgery consultation    Complications: None    Condition at Discharge: good         Discharge instructions/Information to patient and family:   See after visit summary for information provided to patient and family.       Provisions for Follow-Up Care:  See after visit summary for information related to follow-up care and any pertinent home health orders. PCP: Marilyn Bolden DO    Disposition: See After Visit Summary for discharge disposition information. Planned Readmission: No    Discharge Statement   I spent 25 minutes discharging the patient. This time was spent on the day of discharge. I had direct contact with the patient on the day of discharge. Additional documentation is required if more than 30 minutes were spent on discharge. Discharge Medications:  See after visit summary for reconciled discharge medications provided to patient and family.

## 2023-09-19 NOTE — CASE MANAGEMENT
Case Management Discharge Planning Note    Patient name Claudia Kumar  Location [facility] 603/[facility] 860-23 MRN [id number]  : 1940 Date 2023       Current Admission Date: 9/15/2023  Current Admission Diagnosis:Open fracture of temporal bone [facility])   Patient Active Problem List    Diagnosis Date Noted   • Open fracture of temporal bone ([address]) 09/15/2023   • SDH (subdural hematoma) ([address]) 09/15/2023   • Fall 09/15/2023   • SAH (subarachnoid hemorrhage) ([address]) 09/15/2023   • Pneumocephalus 09/15/2023   • Hyponatremia 2023   • Pelvic lymphadenopathy 2023   • Chronic diastolic CHF (congestive heart failure) ([address]) 2023   • Rib pain on left side 2023   • Left inguinal pain 2023   • Chemotherapy-induced thrombocytopenia 02/15/2023   • Shortness of breath 2022   • Anemia due to stage 4 chronic kidney disease ([address]) 2022   • Stage 4 chronic kidney disease ([address]) 08/10/2022   • Secondary hyperparathyroidism of renal origin ([address]) 2022   • Urethral tumor 2022   • Functional diarrhea 2022   • Platelets decreased ([address]) 2022   • Visual hallucinations 2021   • Chronic kidney disease-mineral and bone disorder 10/24/2021   • Benign hypertension with chronic kidney disease, stage IV ([address]) 2021   • Persistent proteinuria 2021   • Anemia 2021   • RBBB 2021   • Hypomagnesemia 15/60/2839   • Metabolic acidosis    • Severe protein-calorie malnutrition ([address]) 2021   • Right sided Pelvic abscess in male [facility]) 2021   • Ambulatory dysfunction 2021   • Frequent falls 2021   • Anxiety and depression 2020   • Overweight 2020   • Fungal dermatitis 2020   • Forearm mass, left 2020   • Elevated troponin 2020   • Liver function study, abnormal 2020   • Malignant neoplasm of urinary bladder neck ([address]) 2020   • Positive urinary cytology 2019   • Coronary artery disease involving native coronary artery of native heart without angina pectoris 09/09/2019   • Ischemic cardiomyopathy 09/09/2019   • History of bladder cancer 07/30/2019   • Closed fracture of upper end of right fibula 03/11/2019   • Malignant neoplasm of overlapping sites of bladder ([address]) 01/10/2019   • 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/2019   • Type 2 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 ([address]) 12/05/2018   • Bladder tumor 11/01/2018   • Overactive bladder 10/10/2018   • 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   • Backache 10/21/2013   • Arteriosclerotic cardiovascular disease 10/11/2012   • DMII (diabetes mellitus, type 2) ([address]) 10/11/2012   • Hyperlipidemia 10/11/2012      LOS (days): 4  Geometric Mean LOS (GMLOS) (days): 4.60  Days to GMLOS:0.7     OBJECTIVE:  Risk of Unplanned Readmission Score: 22.96         Current admission status: Inpatient   Preferred Pharmacy:   [address] [facility].   [place] [address]  Phone: [phone] Fax: Denis Galeas [address] [address]  Phone: [phone] Fax: [phone]    Primary Care Provider: Jacqueline Hernandez DO    Primary Insurance: MEDICARE  Secondary Insurance: BLUE CROSS    DISCHARGE DETAILS:    Discharge planning discussed with[de-identified] Patient  Freedom of Choice: Yes     CM contacted family/caregiver?: Yes     Other Referral/Resources/Interventions Provided:  Referral Comments: Patient and wife agreeable to [facility], Patient will be admitting today to room 265    Transport at Discharge : [facility]  Dispatcher Contacted: Yes  Number/Name of Dispatcher: [facility]  Transported by [employer] and Unit #): [employer] of Transport (Date): 09/19/23  ETA of Transport (Time): [address] Name, [address] : [address] [place] [address]  Receiving Facility/Agency Phone Number: [phone]

## 2023-09-19 NOTE — ASSESSMENT & PLAN NOTE
Temporal bone fracture noted on CT 9/15, with associated subdural hematoma, subarachnoid hemorrhage. Auditory canal bleeding has resolved   - Neuro exam: stable  - Continue neuro checks  - Appreciate neurosurgery and ENT recommendations  - Continue Unasyn for treatment of open fracture  -  7 day course of Keppra for seizure prophylaxis  - Chemical DVT prophylaxis: cleared by NSx 9/17/23  - PT/OT and cognitive evaluation  - Consider CTV/CTA per neurosurgery recommendation given patient has CKD4.  Nephrology recs pre-hydration if CTV/CTA necessary

## 2023-09-19 NOTE — INCIDENTAL FINDINGS
The following findings require follow up:  Radiographic finding   Finding: Multiple findings on CT scan concerning for metastatic disease. - see below. Follow up required: Continued follow up with oncology - will have oncology see patient at [facility]. 1.5 cm soft tissue density nodule inseparable from the left obturator internus muscle, corresponding to the focus of increased FDG activity on May 2023 PET/CT and concerning for local recurrence.     Interval increase in size of 2 dominant left-sided mesenteric lymph nodes, as well as mild interval increase in size of a right external iliac lymph node and at least 2 right-sided retroperitoneal lymph nodes since 8/2/2023 CT, concerning for   metastatic disease.      Hypodense structure along the right pelvic sidewall is similar to August 2023, however is new from older studies, concerning for a necrotic lymph node metastasis.     Two hypodense lesions measuring higher than simple fluid in density in the right inferior hepatic lobe, not present on prior contrast-enhanced CT from November 2020, concerning for metastatic disease.      Peritoneal soft tissue nodularity in the left paracolic gutter, concerning for peritoneal carcinomatosis.     1.5 cm implant in the mesorectal fat on the left, new since August 2023.      Nonspecific right cardiophrenic lymph node measuring 8 mm in short axis and 2 retrocrural lymph nodes also measuring up to 8 mm, more prominent compared to older studies, also possible metastases.     Nonspecific 3 mm right middle lobe nodule, new since March 2023. Attention on follow-up.     I personally discussed these findings with the patient and his wife

## 2023-09-19 NOTE — PLAN OF CARE
Problem: MOBILITY - ADULT  Goal: Maintain or return to baseline ADL function  Description: INTERVENTIONS:  -  Assess patient's ability to carry out ADLs; assess patient's baseline for ADL function and identify physical deficits which impact ability to perform ADLs (bathing, care of mouth/teeth, toileting, grooming, dressing, etc.)  - Assess/evaluate cause of self-care deficits   - Assess range of motion  - Assess patient's mobility; develop plan if impaired  - Assess patient's need for assistive devices and provide as appropriate  - Encourage maximum independence but intervene and supervise when necessary  - Involve family in performance of ADLs  - Assess for home care needs following discharge   - Consider OT consult to assist with ADL evaluation and planning for discharge  - Provide patient education as appropriate  9/19/2023 0142 by Georges Jacobs RN  Outcome: Progressing  9/19/2023 0019 by Georges Jacobs RN  Outcome: Progressing  Goal: Maintains/Returns to pre admission functional level  Description: INTERVENTIONS:  - Perform BMAT or MOVE assessment daily.   - Set and communicate daily mobility goal to care team and patient/family/caregiver.    - Collaborate with rehabilitation services on mobility goals if consulted  - Record patient progress and toleration of activity level   9/19/2023 0142 by Georges Jacobs RN  Outcome: Progressing  9/19/2023 0019 by Georges Jacobs RN  Outcome: Progressing     Problem: PAIN - ADULT  Goal: Verbalizes/displays adequate comfort level or baseline comfort level  Description: Interventions:  - Encourage patient to monitor pain and request assistance  - Assess pain using appropriate pain scale  - Administer analgesics based on type and severity of pain and evaluate response  - Implement non-pharmacological measures as appropriate and evaluate response  - Consider cultural and social influences on pain and pain management  - Notify physician/advanced practitioner if interventions unsuccessful or patient reports new pain  9/19/2023 0142 by Percy Rodrigez RN  Outcome: Progressing  9/19/2023 0019 by Percy Rodrigez RN  Outcome: Progressing     Problem: INFECTION - ADULT  Goal: Absence or prevention of progression during hospitalization  Description: INTERVENTIONS:  - Assess and monitor for signs and symptoms of infection  - Monitor lab/diagnostic results  - Monitor all insertion sites, i.e. indwelling lines, tubes, and drains  - Monitor endotracheal if appropriate and nasal secretions for changes in amount and color  - [place] appropriate cooling/warming therapies per order  - Administer medications as ordered  - Instruct and encourage patient and family to use good hand hygiene technique  - Identify and instruct in appropriate isolation precautions for identified infection/condition  9/19/2023 0142 by Percy Rodrigez RN  Outcome: Progressing  9/19/2023 0019 by Percy Rodrigez RN  Outcome: Progressing     Problem: SAFETY ADULT  Goal: Maintain or return to baseline ADL function  Description: INTERVENTIONS:  -  Assess patient's ability to carry out ADLs; assess patient's baseline for ADL function and identify physical deficits which impact ability to perform ADLs (bathing, care of mouth/teeth, toileting, grooming, dressing, etc.)  - Assess/evaluate cause of self-care deficits   - Assess range of motion  - Assess patient's mobility; develop plan if impaired  - Assess patient's need for assistive devices and provide as appropriate  - Encourage maximum independence but intervene and supervise when necessary  - Involve family in performance of ADLs  - Assess for home care needs following discharge   - Consider OT consult to assist with ADL evaluation and planning for discharge  - Provide patient education as appropriate  9/19/2023 0142 by Percy Rodrigez RN  Outcome: Progressing  9/19/2023 0019 by Percy Rodrigez RN  Outcome: Progressing  Goal: Maintains/Returns to pre admission functional level  Description: INTERVENTIONS:  - Perform BMAT or MOVE assessment daily.   - Set and communicate daily mobility goal to care team and patient/family/caregiver.    - Collaborate with rehabilitation services on mobility goals if consulted  - Record patient progress and toleration of activity level   9/19/2023 0142 by Clotilde Ferrera RN  Outcome: Progressing  9/19/2023 0019 by Clotilde Ferrera RN  Outcome: Progressing  Goal: Patient will remain free of falls  Description: INTERVENTIONS:  - Educate patient/family on patient safety including physical limitations  - Instruct patient to call for assistance with activity   - Consult OT/PT to assist with strengthening/mobility   - Keep Call bell within reach  - Keep bed low and locked with side rails adjusted as appropriate  - Keep care items and personal belongings within reach  - Initiate and maintain comfort rounds  - Make Fall Risk Sign visible to staff  - Apply yellow socks and bracelet for high fall risk patients  - Consider moving patient to room near nurses station  9/19/2023 0142 by Clotilde Ferrera RN  Outcome: Progressing  9/19/2023 0019 by Clotilde Ferrera RN  Outcome: Progressing     Problem: DISCHARGE PLANNING  Goal: Discharge to home or other facility with appropriate resources  Description: INTERVENTIONS:  - Identify barriers to discharge w/patient and caregiver  - Arrange for needed discharge resources and transportation as appropriate  - Identify discharge learning needs (meds, wound care, etc.)  - Arrange for interpretive services to assist at discharge as needed  - Refer to Case Management Department for coordinating discharge planning if the patient needs post-hospital services based on physician/advanced practitioner order or complex needs related to functional status, cognitive ability, or social support system  9/19/2023 0142 by Clotilde Ferrera RN  Outcome: Progressing  9/19/2023 0019 by Clotilde Ferrera RN  Outcome: Progressing     Problem: Knowledge Deficit  Goal: Patient/family/caregiver demonstrates understanding of disease process, treatment plan, medications, and discharge instructions  Description: Complete learning assessment and assess knowledge base. Interventions:  - Provide teaching at level of understanding  - Provide teaching via preferred learning methods  9/19/2023 0142 by Nguyen Baptiste RN  Outcome: Progressing  9/19/2023 0019 by Nguyen Baptiste RN  Outcome: Progressing     Problem: Prexisting or High Potential for Compromised Skin Integrity  Goal: Skin integrity is maintained or improved  Description: INTERVENTIONS:  - Identify patients at risk for skin breakdown  - Assess and monitor skin integrity  - Assess and monitor nutrition and hydration status  - Monitor labs   - Assess for incontinence   - Turn and reposition patient  - Assist with mobility/ambulation  - Relieve pressure over bony prominences  - Avoid friction and shearing  - Provide appropriate hygiene as needed including keeping skin clean and dry  - Evaluate need for skin moisturizer/barrier cream  - Collaborate with interdisciplinary team   - Patient/family teaching  - Consider wound care consult   9/19/2023 0142 by Nguyen Baptiste RN  Outcome: Progressing  9/19/2023 0019 by Nguyen Baptiste RN  Outcome: Progressing     Problem: Nutrition/Hydration-ADULT  Goal: Nutrient/Hydration intake appropriate for improving, restoring or maintaining nutritional needs  Description: Monitor and assess patient's nutrition/hydration status for malnutrition. Collaborate with interdisciplinary team and initiate plan and interventions as ordered. Monitor patient's weight and dietary intake as ordered or per policy. Utilize nutrition screening tool and intervene as necessary. Determine patient's food preferences and provide high-protein, high-caloric foods as appropriate.      INTERVENTIONS:  - Monitor oral intake, urinary output, labs, and treatment plans  - Assess nutrition and hydration status and recommend course of action  - Evaluate amount of meals eaten  - Assist patient with eating if necessary   - Allow adequate time for meals  - Recommend/ encourage appropriate diets, oral nutritional supplements, and vitamin/mineral supplements  - Order, calculate, and assess calorie counts as needed  - Recommend, monitor, and adjust tube feedings and TPN/PPN based on assessed needs  - Assess need for intravenous fluids  - Provide specific nutrition/hydration education as appropriate  - Include patient/family/caregiver in decisions related to nutrition  9/19/2023 0142 by Tanja Nguyen RN  Outcome: Progressing  9/19/2023 0019 by Tanja Nguyen, RN  Outcome: Progressing

## 2023-09-19 NOTE — PROGRESS NOTES
[address] [facility]  Progress Note  Name: Karon Romero  MRN: [id number]  Unit/Bed#: [facility] 194-61 I Date of Admission: 9/15/2023   Date of Service: 9/19/2023 I Hospital Day: 4    Assessment/Plan   Pneumocephalus  Assessment & Plan  See above    SAH (subarachnoid hemorrhage) [facility])  Assessment & Plan  See above    Fall  Assessment & Plan  - Status post fall with noted injuries. - Fall precautions. - Geriatric Medicine consultation for evaluation, medication review and recommendations.  - PT and OT evaluation and treatment as indicated. - Case Management consultation for disposition planning. SDH (subdural hematoma) (HCC)  Assessment & Plan  -Repeat imaging stable     Elevated troponin  Assessment & Plan  - troponin peaked at  458 9/16/23  - no active chest pain   - EKG shows no ischemia  - Cards consult, appreciate recs; non-MI troponin elevation, no intervention required  - ECHO performed: EF 54% (see details of report 9/15/23)    * Open fracture of temporal bone [facility])  Assessment & Plan  Temporal bone fracture noted on CT 9/15, with associated subdural hematoma, subarachnoid hemorrhage. Auditory canal bleeding has resolved   - Neuro exam: stable  - Continue neuro checks  - Appreciate neurosurgery and ENT recommendations  - Continue Unasyn for treatment of open fracture  -  7 day course of Keppra for seizure prophylaxis  - Chemical DVT prophylaxis: cleared by NSx 9/17/23  - PT/OT and cognitive evaluation  - Consider CTV/CTA per neurosurgery recommendation given patient has CKD4. Nephrology recs pre-hydration if CTV/CTA necessary                Bowel Regimen: Colace  VTE Prophylaxis:Enoxaparin (Lovenox)     Disposition: Has been to [facility] in past    Subjective   Chief Complaint:    Subjective: Reporting muffled hearing again today. No new bleeding from auditory canal. Otherwise doing well.       Objective   Vitals:   Temp:  [97.6 °F (36.4 °C)-99.2 °F (37.3 °C)] 97.9 °F (36.6 °C)  HR:  [66-68] 67  Resp:  [16-17] 17  BP: (120-181)/() 120/70        Physical Exam:   GENERAL APPEARANCE: NAD  NEURO: Patient is speaking clearly in complete sentences. Patient is answering appropriately and able follow commands. Patient is moving all four extremities spontaneously. No facial droop. Tongue midline. HEENT: PERRL, Moist mucous membranes, external ears normal, nose normal  Neck: No cervical adenopathy  CV: RRR. No murmurs, rubs, gallops  LUNGS: Clear to auscultation: No wheezes, stridor, rhonchi, rales  GI: Abdomen non-distended. Soft. Non-tender and without rebound or guarding   : Deferred at this time  MSK: No deformity. Skin: Warm and dry  Capillary refill: <2 seconds      Invasive Devices     Peripheral Intravenous Line  Duration           Peripheral IV 09/15/23 Left;Proximal;Ventral (anterior) Forearm 3 days    Peripheral IV 09/19/23 Dorsal (posterior); Right Hand <1 day          Drain  Duration           Urostomy -- days    Urostomy Ureterostomy right RLQ 3 days                      Lab Results: Results: I have personally reviewed all pertinent laboratory/tests results  Imaging: I have personally reviewed pertinent reports.

## 2023-09-19 NOTE — PLAN OF CARE
Problem: GENITOURINARY - ADULT  Goal: Maintains or returns to baseline urinary function  Description: INTERVENTIONS:  - Assess urinary function  - Encourage oral fluids to ensure adequate hydration if ordered  - Administer IV fluids as ordered to ensure adequate hydration  - Administer ordered medications as needed  - Offer frequent toileting  - Follow urinary retention protocol if ordered  Outcome: Progressing  Goal: Urinary catheter remains patent  Description: INTERVENTIONS:  - Assess patency of urinary catheter  - If patient has a chronic sweet, consider changing catheter if non-functioning  - Follow guidelines for intermittent irrigation of non-functioning urinary catheter  Outcome: Progressing

## 2023-09-19 NOTE — PROGRESS NOTES
Progress Note - Brigette Prudent 80 y.o. male MRN: [id number]    Unit/Bed#: [facility] 603-01 Encounter: [id number]      Assessment/Plan:  Ambulatory dysfunction  At risk for falls secondary to age, hx of fall, gait instability, polypharmacy, visual impairment  Fall precautions  PT/OT  Increased physical exercise, recommend balance and strengthening exercises  Rehab post hospitalization     Acute pain due to trauma  Geriatric pain protocol  Scheduled acetaminophen 975 mg po Q8  Oxycodone 2.5 mg po Q 4 prn moderate pain  Oxycodone 5 mg po Q 4 prn severe pain   Monitor for constipation, senna s BID and miralax x 1 and prn  Lidoderm patch      Frailty  Clinical Frail Scale: 5- Mildly Frail  More evident slowing, needs help high order IADLs (transport, bills, medications)  Albumin 3.4, maintain protein in diet  PT/OT  Continue supportive care     Cognitive screening  No reported prior memory issues  TSH 2.845 (9/5/23)  Recommend check  vitamin B12  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  Keep physically, mentally and socially active     Depression  Chronically on celexa 20 mg po daily  Doing well      Hyponatremia  In the setting of CKD4, demadex,celexa  Follows with nephrology as outpatient  Monitor NA, 136 (9/19/23)     Delirium precautions  Baseline: alert and oriented x 3  Provide frequent redirection, reorientation, distraction techniques  Avoid deliriogenic medications such as tramadol, benzodiazepines, anticholinergics,  Benadryl  Treat pain, See geriatric pain protocol  Monitor for constipation and urinary retention  Encourage early and frequent moblization, OOB  Encourage Hydration/ Nutrition  Implement sleep hygiene, limit night time interuptions, group activities     Insomnia  Related to hospitalization  First line is behavioral therapy  Avoid sedative hypnotics such as benzodiazepines and benadryl  Encourage staying awake during the day  Encourage daytime activity, morning exercise  Decrease or eliminate day time naps  Avoid caffeine especially during late afternoon and evening hours  Establish a night time routine     Open fracture of temporal bone with SDH  S/p fall  ENT and neurosurgery on consult  Unasyn for open fx  keppra for seizure ppx  PT/OT cog eval     Elevated TPN  TPN (9/16/23)  Cardiology on consult    Subjective:   Upon exam pt is lying in bed. He is alert and oriented x 3. He states he slept well. Last BM 9/15/23    Objective:     Vitals: Blood pressure 148/94, pulse 66, temperature 97.9 °F (36.6 °C), resp. rate 17, height 5' 7" (1.702 m), weight 83.5 kg (184 lb), SpO2 99 %. ,Body mass index is 28.82 kg/m². Intake/Output Summary (Last 24 hours) at 9/19/2023 0935  Last data filed at 9/19/2023 0833  Gross per 24 hour   Intake 678 ml   Output 1650 ml   Net -972 ml       Physical Exam:   General : NAD  HEENT : MMM   Heart : Normal rate, no murmur rub or gallop  Lungs : CTA no wheezes, rales or rhonchi  Abdomen : Soft, NT/ND, BS auscultated in all 4 quads. Ext :  no edema  Skin : Pink, warm, dry, age appropriate turgor and mobility  Neuro : Nonfocal  Psych : Alert and O x 3       Invasive Devices     Peripheral Intravenous Line  Duration           Peripheral IV 09/15/23 Left;Proximal;Ventral (anterior) Forearm 3 days    Peripheral IV 09/19/23 Dorsal (posterior); Right Hand <1 day          Drain  Duration           Urostomy -- days    Urostomy Ureterostomy right RLQ 3 days                Lab, Imaging and other studies: I have personally reviewed pertinent reports.     VTE Pharmacologic Prophylaxis: Sequential compression device (Venodyne)   VTE Mechanical Prophylaxis: sequential compression device

## 2023-09-19 NOTE — PROGRESS NOTES
PHYSICAL MEDICINE AND REHABILITATION   PREADMISSION ASSESSMENT      Projected [facility] Diagnoses:  Impairment of mobility, safety and Activities of Daily Living (ADLs) due to Brain Dysfunction:  02.22  Traumatic, Closed Injury  Etiologic: temporal bone fracture with associated subdural hematoma and subarachnoid hemorrhage   Date of Onset: 9/15/23   Date of surgery: n/a    PATIENT INFORMATION  Name: Jenna Martinez Phone #: [phone] (home)   Address: [address] [place] [address]  YOB: 1940 Age: 80 y.o. #   Marital Status: /Civil Union  Ethnicity: Not  or  or [place]  Employment Status: retired  Extended Emergency Contact Information  Primary Emergency Contact: Sheyla Wheeler  Address: [address] [facility],[address], [address] [facility] of [address] [place] [address] Phone: [phone]  Relation: Spouse  Secondary Emergency Contact: [address] [place] [address] of [address] [place] [address] Av Phone: [phone]  Relation: Daughter  Advance Directive: Level 1 Full Code (no ACP docs)    INSURANCE/COVERAGE:     Primary Payor: MEDICARE / Plan: MEDICARE A AND B / Product Type: Medicare A & B Fee for Service /   Secondary Payer: [employer] #[id number]   Payer Contact:  Payer Contact:   Contact Phone:  Contact Phone:     Authorization #: n/a  Coverage Dates: n/a  LCD: n/a  MEDICARE #: [id number]  Medicare Days: 60/30/60  Medical Record #: [id number]    REFERRAL SOURCE:   Referring provider: Navya Osorio MD  Referring facility: [address] [place] [address]  Room: [address] [place] [address] [id number]  PCP: Sami Lazcano DO PCP phone number: [phone]    MEDICAL INFORMATION  HPI:  This is an 80-year-old male who presented to [address] [place] [address] on 9/15/23 after a fall. Patient was moving something into his truck and fell backwards, hitting his head on the ground.   He was found to have and acute multi-compartment hemorrhage (Subarachnoid hemorrhage and subdural hematoma); temporal bone fracture and small pneumocephalus. Neurosurgery was consulted and ordered 7 days Keppra for seizure ppx. They did not recommend any surgical intervention. He is being treated with Unasyn for his open temporal bone fracture. He had continued bleeding from right ear s/p administration of afrin. ENT was consulted and per consult-hearing loss in right ear- tuning fork exam consistent with conductive pattern secondary to debris in canal and middle ear. Outpatient follow-up was recommended with audiogram after discharge. He was also ordered Floxin drops twice daily 5 drops to right ear x 10 days due to debris in canal.  Patient also with new onset atrial flutter - Per cards consult, HR is well controlled. Continue beta-blocker. Anticoagulation is recommended for stroke prevention, but not able to start this yet due to bleed. Recommended starting Eliquis once cleared from a neurosurgical standpoint. He is to follow-up with neurosurgery after discharge. Patient has a prior history of CABG. Echo performed at the bedside showed some improvement of his EF to the low normal range. From a cardiology standpoint, he is to continue all outpatient medical therapy. Patient also has a history of CKD stage IV-baseline creatinine around 2.5-2.8. Creatinine on 9/18-2.2. Patient worked with therapy and recommended for rehab following his hospital stay. He has demonstrated that he can tolerate three hours of therapy, five days a week and is now medically cleared for discharge to the [facility]. Past Medical History:   Past Surgical History:    Allergies:     Past Medical History:   Diagnosis Date   • Acute kidney injury ([address])    • Anemia Jan. 2022   • Arthritis     Hands   • Wright esophagus    • BPH with obstruction/lower urinary tract symptoms    • CAD (coronary artery disease)     Last assessed 09/16/15   • Cancer [facility])     bladder   • Cataract, acquired     Last assessed 10/10/17   • Chronic kidney disease    • Coronary artery disease    • Diabetes mellitus ([address])     NIDDM   • Diabetic neuropathy (HCC)     Feet   • Enlarged prostate with lower urinary tract symptoms (LUTS)     Last assessed 10/10/17   • Erectile dysfunction    • GERD (gastroesophageal reflux disease)     Last assessed 10/10/17   • Hemoptysis     Last assessed 03/14/16   • Hypercholesterolemia     Last assessed 10/10/17   • Hypertension     Last assessed 10/10/17   • Kidney stone    • Macular degeneration     Right eye is particularly affected-peripheral vision intact   • Myocardial infarction [facility]) 1998   • OAB (overactive bladder)    • Testicular hypofunction    • Testicular hypogonadism     Last assessed 10/10/17   • Tinnitus    • Umbilical hernia     Last assessed 06/18/14   • Urge incontinence of urine    • Wears glasses     Past Surgical History:   Procedure Laterality Date   • BLADDER SURGERY     • COLONOSCOPY     • CORONARY ARTERY BYPASS GRAFT  07/16/2014    ABG x 4 LIMA to LAD,SVG to diagnoal 2 SVG to OM-1, SVG to PDA, resection of partial plerual mass   • CT GUIDED PERC DRAINAGE CATHETER PLACEMENT  02/19/2021   • CYSTOSCOPY  2013   • CYSTOSCOPY  02/08/2022    Olya   • ESOPHAGOGASTRODUODENOSCOPY     • FL RETROGRADE PYELOGRAM  12/20/2018   • FL RETROGRADE PYELOGRAM  12/05/2019   • INGUINAL HERNIA REPAIR Bilateral 2015   • IR DRAINAGE TUBE CHECK AND/OR REMOVAL  03/05/2021   • PHOTODYNAMIC THERAPY      For Barretts esophagus   • MI COLONOSCOPY FLX DX W/COLLJ SPEC WHEN PFRMD N/A 04/25/2016    Procedure: COLONOSCOPY;  Surgeon: Duncan Evangelista MD;  Location: [facility];   Service: Gastroenterology   • MI CYSTO W/REMOVAL OF LESIONS SMALL N/A 12/05/2019    Procedure: CYSTO W/TURBT AND TRANSURETHRAL PROSTATE BIOPSY AND OPENING OF BLADDER NECK CONTRACTURE, B/L Retrograde pyelogram;  Surgeon: Uziel Sunshine MD;  [facility];  Service: Urology   • MI CYSTOURETHROSCOPY W/DEST &/RMVL MED BLADDER TY N/A 04/16/2020    Procedure: CYSTOSCOPY, TRANSURETHRAL RESECTION OF BLADDER TUMOR (TURBT); Surgeon: iTm Wilson MD;  [facility];  Service: Urology   • NC CYSTOURETHROSCOPY WITH BIOPSY N/A 09/10/2020    Procedure: CYSTOSCOPY, COLLECTION OF LEFT KIDNEY CYTOLOGY, BILATERAL RETROGRADE PYELOGRAM, BLADDER WALL BIOPSIES  AND FULGERAION, RANDOM BLADDER TUMOR BIOPSIES;  Surgeon: Tim Wilson MD;  Location: [facility] MAIN OR;  Service: Urology   • NC CYSTOURETHROSCOPY WITH BIOPSY N/A 04/05/2022    Procedure: urethroscopy , biopsy of urethra with fulgeration;  Surgeon: Reji Talley MD;  Location:  MAIN OR;  Service: Urology   • PROSTATE SURGERY     • TONSILLECTOMY     • TRANSURETHRAL RESECTION OF PROSTATE N/A 12/20/2018    Procedure: CYSTO, PHOTO SELECTIVE VAPORIZATION OF PROSTATE, B/L RETROGRADE PYELOGRAM, TURBT;  Surgeon: Tim Wilson MD;  [facility];  Service: Urology   • UMBILICAL HERNIA REPAIR  2012   • UPPER GASTROINTESTINAL ENDOSCOPY       Allergies   Allergen Reactions   • Fentanyl Hallucinations   • Morphine And Related Other (See Comments)     While having an MI patient received morphine and had adverse reaction but doesn't know what happened.          Medical/functional conditions requiring inpatient rehabilitation:   Subarachnoid hemorrhage  Subdural hematoma  Open fracture of the temporal bone    Small pneumocephalus   Elevated troponin  New onset a-flutter  Hyponatremia  Acute pain due to trauma  Ambulatory dysfunction  Bleeding from right ear  Depression  GERD  HTN  DM    Risk for medical/clinical complications: risk for falls, risk for worsening bleed, risk for uncontrolled pain, risk for DVT/PE/CVA, risk for skin breakdown, risk for hospital delirium, risk for cardiac complications, risk for infection, risk for further problems with right ear, risk for seizures, risk for hypo/hyperglycemia episodes, complications with urostomy      Comorbidities/Surgeries in the last 100 days:   CKD  CAD  BPH  GERD  HTN  DM  Hypercholesterolemia  H/o MI  History of bladder cancer s/p radical cystoprostatectomy with ileal conduit    CURRENT VITAL SIGNS:   Temp:  [97.6 °F (36.4 °C)-99.2 °F (37.3 °C)] 97.9 °F (36.6 °C)  HR:  [66-68] 67  Resp:  [16-17] 17  BP: (134-181)/(57-94) 181/86   Intake/Output Summary (Last 24 hours) at 9/19/2023 1246  Last data filed at 9/19/2023 1233  Gross per 24 hour   Intake 1015 ml   Output 1650 ml   Net -635 ml        LABORATORY RESULTS:      Lab Results   Component Value Date    HGB 10.3 (L) 09/15/2023    HGB 13.9 01/14/2015    HCT 31.4 (L) 09/15/2023    HCT 42.4 01/14/2015    WBC 15.04 (H) 09/15/2023    WBC 8.67 01/14/2015     Lab Results   Component Value Date    BUN 36 (H) 09/19/2023    BUN 22 08/13/2015     08/13/2015    K 3.7 09/19/2023    K 4.2 08/13/2015     09/19/2023     (H) 08/13/2015    GLUCOSE 128 08/13/2015    CREATININE 2.29 (H) 09/19/2023    CREATININE 1.29 08/13/2015     Lab Results   Component Value Date    PROTIME 14.4 09/15/2023    PROTIME 12.9 12/21/2014    INR 1.10 09/15/2023    INR 0.99 12/21/2014        DIAGNOSTIC STUDIES:  CT head wo contrast    Result Date: 9/17/2023  Impression: Stable posttraumatic subarachnoid hemorrhage and suspected small contusion in the inferior left frontal lobe. Minimal hemorrhage in the occipital horns of the lateral ventricles is mildly increased Right hemispheric hypodense subdural collection is mildly increased from yesterday and new since 2015. Favor posttraumatic subdural hygroma. Workstation performed: [id number]     CT head wo contrast    Result Date: 9/16/2023  Impression: 1. Scattered hyperdense/acute subarachnoid and subdural hemorrhage in the right frontotemporal region is similar to the study from yesterday. No significant mass effect or new hemorrhage.  There is also small amount of isodense to hypodense subdural fluid  over the right frontotemporal region, possibly subacute to chronic subdural hemorrhage, also stable. 2. Scalp hematoma parietal region posteriorly redemonstrated. 3. Chronic appearing right caudate lacunar infarction and mild, chronic microangiopathy stable. No new large territorial infarction. Workstation performed: [id number]     CT chest abdomen pelvis w contrast    Result Date: 9/15/2023  Impression: 1) No acute thoracic, abdominal or pelvic trauma. 2) 1.5 cm soft tissue density nodule inseparable from the left obturator internus muscle, corresponding to the focus of increased FDG activity on May 2023 PET/CT and concerning for local recurrence. 3) Interval increase in size of 2 dominant left-sided mesenteric lymph nodes, as well as mild interval increase in size of a right external iliac lymph node and at least 2 right-sided retroperitoneal lymph nodes since 8/2/2023 CT, concerning for metastatic disease. 4) Hypodense structure along the right pelvic sidewall is similar to August 2023, however is new from older studies, concerning for a necrotic lymph node metastasis. 5) Two hypodense lesions measuring higher than simple fluid in density in the right inferior hepatic lobe, not present on prior contrast-enhanced CT from November 2020, concerning for metastatic disease. 6) Peritoneal soft tissue nodularity in the left paracolic gutter, concerning for peritoneal carcinomatosis. 7) 1.5 cm implant in the mesorectal fat on the left, new since August 2023. 8) Nonspecific right cardiophrenic lymph node measuring 8 mm in short axis and 2 retrocrural lymph nodes also measuring up to 8 mm, more prominent compared to older studies, also possible metastases. 9) Nonspecific 3 mm right middle lobe nodule, new since March 2023. Attention on follow-up. 10) Multiple additional findings as above. The study was marked in [facility] and the [facility] for immediate notification. Workstation performed: [id number]     CT spine thoracic & lumbar wo contrast    Result Date: 9/15/2023  Impression: No acute fractures.  No evidence for traumatic malalignment. Workstation performed: [id number]     CT recon (no charge)    Result Date: 9/15/2023  Impression: RIGHT TEMPORAL BONE CT - Acute comminuted otic sparing right temporal bone fracture involving the squamosal, mastoid, and tympanic portions with diastases of occipitotemporal suture and extension of fracture into visualized right occipital calvarium. Recommend follow-up CTV head with contrast to assess underlying patency of right dural venous sinuses. - Probable small-to-moderate RIGHT-sided bloody mastoid effusion, large bloody middle ear effusion, and probable debris and blood products throughout external auditory canal. - Small scalp hematoma in right occipito-temporal region. LEFT TEMPORAL BONE CT -Trace left mastoid effusion. Otherwise, normal LEFT temporal bone CT. Please see same day CT head without contrast for further evaluation especially given right-sided subarachnoid hemorrhage and small volume pneumocephalus. The study was marked in [facility] for immediate notification. Workstation performed: [id number]     CT head without contrast    Result Date: 9/15/2023  Impression: Multicompartment hemorrhage with small foci of subarachnoid hemorrhage in the bilateral inferior frontal region. Small foci of subdural hemorrhage in the right insular region and temporal region without significant mass effect Additional small foci of subarachnoid hemorrhage versus small contusions in the right temporal region Air noted within the right sigmoid sinus, transverse sinus with associated fracture of the right temporal bone with small foci of extra-axial air in the right middle cranial fossa and hemorrhage in the right middle ear.  Nondisplaced fracture through the right occipital bone I personally discussed this study with Ana Luisa Hoyt on 9/15/2023 3:01 PM. Workstation performed: [id number]     CT cervical spine without contrast    Result Date: 9/15/2023  Impression: No acute compression collapse of the vetebra Fracture of the right temporal bone, incompletely assessed Incidental 1.8 cm right thyroid nodule, meets the size/criteria for further assessment with ultrasound for an incidentally detected nodule Workstation performed: [id number]     XR chest 1 view portable    Result Date: 9/15/2023  Impression: No acute cardiopulmonary disease.  Workstation performed: [id number]       PRECAUTIONS/SPECIAL NEEDS:  Blood Sugar Management: as per MD orders, Pain Management, Dietary Restrictions: Regular diet, Hard of Hearing, Visually Impaired, Language Preference: English and fall precautions, seizure precautions    MEDICATIONS:     Current Facility-Administered Medications:   •  acetaminophen (TYLENOL) tablet 975 mg, 975 mg, Oral, Q8H [facility], Tuesday LUKE Marie, 975 mg at 09/19/23 0553  •  amLODIPine (NORVASC) tablet 5 mg, 5 mg, Oral, Daily, Ten Castro MD, 5 mg at 09/19/23 0914  •  ampicillin-sulbactam (UNASYN) 3 g in sodium chloride 0.9 % 100 mL IVPB, 3 g, Intravenous, Q12H, Leah Valencia MD, Last Rate: 200 mL/hr at 09/19/23 1011, 3 g at 09/19/23 1011  •  atorvastatin (LIPITOR) tablet 40 mg, 40 mg, Oral, HS, Leah Valencia MD, 40 mg at 09/18/23 2105  •  citalopram (CeleXA) tablet 20 mg, 20 mg, Oral, Daily, Leah Valencia MD, 20 mg at 09/19/23 0914  •  enoxaparin (LOVENOX) subcutaneous injection 30 mg, 30 mg, Subcutaneous, Q24H [facility], Reena Gandhi PA-C, 30 mg at 09/19/23 0915  •  HYDROmorphone HCl (DILAUDID) injection 0.2 mg, 0.2 mg, Intravenous, Q6H PRN, Leah Valencia MD  •  insulin lispro (HumaLOG) 100 units/mL subcutaneous injection 1-6 Units, 1-6 Units, Subcutaneous, TID AC, 2 Units at 09/18/23 1222 **AND** Fingerstick Glucose (POCT), , , TID AC, Leah Valencia MD  •  levETIRAcetam (KEPPRA) 500 mg in sodium chloride 0.9 % 100 mL IVPB, 500 mg, Intravenous, Q12H [facility], Reena Gandhi PA-C, Stopped at 09/19/23 1011  •  ofloxacin (OCUFLOX) 0.3 % ophthalmic solution 5 drop, 5 drop, Right Eye, 4x Daily, Frederic Lane, 5 drop at 09/19/23 1225  •  ondansetron (ZOFRAN) injection 4 mg, 4 mg, Intravenous, Q6H PRN, Reena Gandhi PA-C, 4 mg at 09/17/23 2936  •  oxyCODONE (ROXICODONE) IR tablet 5 mg, 5 mg, Oral, Q8H PRN, Maria C Rahman MD, 5 mg at 09/16/23 2020  •  oxyCODONE (ROXICODONE) split tablet 2.5 mg, 2.5 mg, Oral, Q8H PRN, Mraia C Rahman MD  •  pantoprazole (PROTONIX) EC tablet 40 mg, 40 mg, Oral, Early Morning, Maria C Rahman MD, 40 mg at 09/19/23 9308  •  polyethylene glycol (MIRALAX) packet 17 g, 17 g, Oral, Daily PRN, Tuesday M LUKE Robertson  •  senna-docusate sodium (SENOKOT S) 8.6-50 mg per tablet 1 tablet, 1 tablet, Oral, HS, Tuesday M LUKE Robertson  •  sodium bicarbonate tablet 1,300 mg, 1,300 mg, Oral, BID after meals, Valentino Hernandez PA-C, 1,300 mg at 09/19/23 1225  •  torsemide (DEMADEX) tablet 20 mg, 20 mg, Oral, Every Other Day, Valentino Hernandez PA-C, 20 mg at 09/19/23 1225    SKIN INTEGRITY:   Urostomy site      PRIOR LEVEL OF FUNCTION:  He lives in [facility] single family home  Yanira Catherine is  and lives with their spouse. Self Care: ambulated with a SPC at baseline, Indoor Mobility: needed some assistance with ADLs at times, Stairs (in/outdoor): Independent and Cognition: Independent     FALLS IN THE LAST 6 MONTHS: 1-4    HOME ENVIRONMENT:  The living area: Patient lives in a one story home  There are 2 steps to enter the home. The patient will have 24 hour supervision/physical assistance available upon discharge. PREVIOUS DME:  Equipment in home (previous DME): Shower Chair, Grab Bars and walker, [facility]    FUNCTIONAL STATUS:  Physical Therapy Occupational Therapy Speech Therapy   09/17/23 5139    Note Type   Note type Evaluation   Pain Assessment   Pain Assessment Tool 0-10   Pain Score 4   Pain Location/Orientation Location: Head   Pain Onset/Description Onset: Ongoing;Frequency: Constant/Continuous; Descriptor: Aching   Effect of Pain on Daily Activities no change in pain with activity   Patient's Stated Pain Goal No pain   Hospital Pain Intervention(s) Ambulation/increased activity;Repositioned   Restrictions/Precautions   Weight Bearing Precautions Per Order No   Other Precautions Chair Alarm; Bed Alarm;Multiple lines; Fall Risk;Pain   Home Living   Type of [facility]  Two level; Able to live on main level with bedroom/bathroom;Stairs to enter with rails   Home Equipment Walker;Cane   Additional Comments Pt reports living with spouse who is able to provide assist if needed   Prior Function   Level of [place] Independent with functional mobility   Lives With Spouse   Receives Help From [facility] in the last 6 months 1 to 4   Comments Pt reports the occasional use of a SPC for ambulation PTA   General   Family/Caregiver Present No   Cognition   Overall Cognitive Status WFL   Arousal/Participation Alert   Orientation Level Oriented X4   Memory Within functional limits   Following Commands Follows one step commands without difficulty   RUE Assessment   RUE Assessment WFL   LUE Assessment   LUE Assessment WFL   RLE Assessment   RLE Assessment WFL   Strength RLE   RLE Overall Strength 4-/5   LLE Assessment   LLE Assessment WFL   Strength LLE   LLE Overall Strength 4-/5   Bed Mobility   Supine to Sit 4  Minimal assistance   Additional items Assist x 1; Increased time required;Verbal cues   Transfers   Sit to Stand 3  Moderate assistance   Additional items Assist x 1; Increased time required;Verbal cues   Stand to Sit 3  Moderate assistance   Additional items Assist x 1; Increased time required;Verbal cues   Additional Comments cues needed for hand placement during transfers   Ambulation/Elevation   Gait pattern Excessively slow; Short stride; Foward flexed; Inconsistent santy   Gait Assistance 4  Minimal assist   Additional items Assist x 1   Assistive Device Rolling walker   Distance 15ft x 2   Balance   Static Sitting Fair - Static Standing Poor +   Ambulatory Poor +   Endurance Deficit   Endurance Deficit Yes   Endurance Deficit Description fatigue   Activity Tolerance   Activity Tolerance Patient limited by fatigue   Medical Staff Made Aware Kalani Cannon, OT; OT present for co evaluation due to pts current medical presentation   Nurse Made Aware Pt appropriate to be seen and mobilize per nsg   Assessment   Prognosis Good   Problem List Decreased strength;Decreased endurance; Impaired balance;Decreased mobility;Pain   Assessment Pt is 80 y.o. male seen for PT evaluation s/p admit to [facility] on 9/15/2023. Two pt identifiers were used to confirm. Pt presented s/p fall while moving something into his truck. Pt was admitted with a primary dx of: Open fractureof temporal bone, subdural hematoma, subarachnoid hemorrhage, and other active problems including CKD, pneumocephalus, elevated troponin. PT now consulted for assessment of mobility and d/c needs. Pt with Up in chair orders. Pts current co morbidities affecting treatment include:  has a past medical history of Acute kidney injury ([address]), Anemia, Arthritis, Wright esophagus, BPH with obstruction/lower urinary tract symptoms, CAD (coronary artery disease), Cancer ([address]), Cataract, acquired, Chronic kidney disease, Coronary artery disease, Diabetes mellitus ([address]), Diabetic neuropathy ([address]), Enlarged prostate with lower urinary tract symptoms (LUTS), Erectile dysfunction, GERD (gastroesophageal reflux disease), Hemoptysis, Hypercholesterolemia, Hypertension, Kidney stone, Macular degeneration, Myocardial infarction ([address]), OAB (overactive bladder), Testicular hypofunction, Testicular hypogonadism, Tinnitus, Umbilical hernia, Urge incontinence of urine, and Wears glasses. . Pts current clinical presentation is Unstable/ Unpredictable (high complexity) due to Ongoing medical management for primary dx, Increased reliance on more restrictive AD compared to baseline, Decreased activity tolerance compared to baseline, Fall risk, Increased assistance needed from caregiver at current time, Continuous pulse oximetry monitoring   . Upon evaluation, pt currently is requiring Min Ax1 for bed mobility; Mod Ax1 for transfers and Min Ax1 for ambulation w/ RW. Pt presents at PT eval functioning below baseline and currently w/ overall mobility deficits 2* to: BLE weakness, impaired balance, decreased endurance, gait deviations, decreased activity tolerance compared to baseline, fall risk. Pt currently at a fall risk 2* to impairments listed above. Based on the aforementioned PT evaluation, pt will continue to benefit from skilled Acute PT interventions to address stated impairments; to maximize functional mobility; for ongoing pt/ family training; and DME needs. At conclusion of PT session pt returned back in chair and chair alarm engaged with phone and call bell within reach. Pt denies any further questions at this time. PT is currently recommending Rehab. PT will continue to follow during hospital stay. 09/17/23 0920    OT Last Visit   OT Visit Date 09/17/23   Note Type   Note type Evaluation   Pain Assessment   Pain Assessment Tool 0-10   Pain Score 4   Pain Location/Orientation Location: Head   Hospital Pain Intervention(s) Repositioned; Ambulation/increased activity; Emotional support;Relaxation technique   Restrictions/Precautions   Weight Bearing Precautions Per Order No   Other Precautions Chair Alarm; Bed Alarm; Fall Risk;Pain   Home Living   Type of [address] [place] [address] Dr Two level; Able to live on main level with bedroom/bathroom;Stairs to enter with rails   Home Equipment Walker;Cane   Prior Function   Level of [place] Independent with ADLs; Independent with functional mobility; Independent with IADLS   Lives With Spouse   Receives Help From [facility] in the last 6 months 1 to 4   Vocational Retired   [address] [place] [address] and mobility with occasional use of SPC pta - shares homemaking with spouse   Reciprocal Relationships supportive family   Service to Others retired   Intrinsic Gratification mostly sedentary   Subjective   Subjective offers no c/o   ADL   Eating Assistance 7  Independent   Grooming Assistance 5  621 [facility] 4  Minimal Assistance    [facility] 3  Moderate Assistance   [address] [facility] 4  Minimal Assistance   [address] [place] [address] 3  Moderate [address]  3  Moderate Assistance   Bed Mobility   Supine to Sit 4  Minimal assistance   Additional Comments pt reports increased dizziness upon sitting and standing   Transfers   Sit to Stand 3  Moderate assistance   Stand to Sit 3  Moderate assistance   Functional Mobility   Functional Mobility 3  Moderate assistance   Additional items Rolling walker   Balance   Static Sitting Fair   Dynamic Sitting Fair -   Static Standing Poor +   Dynamic Standing Poor   Ambulatory Poor   Activity Tolerance   Activity Tolerance Patient limited by fatigue;Patient limited by pain   [facility] DPT present for coeval 2* medical complexity, comorbidities and limited overall tolerance to activity   RUE Assessment   RUE Assessment WFL   LUE Assessment   LUE Assessment WFL   Cognition   Overall Cognitive Status WFL   Assessment   Limitation Decreased ADL status; Decreased endurance;Decreased self-care trans;Decreased high-level ADLs   Prognosis Good   Assessment Pt is a 80 y.o. male who was admitted to [address] [place] [address] on 9/15/2023 with SDH, traumatic pneumocephalus and temporal bone fx s/p fall backwards striking head .  Patient  has a past medical history of Acute kidney injury ([address]), Anemia, Arthritis, Wright esophagus, BPH with obstruction/lower urinary tract symptoms, CAD (coronary artery disease), Cancer ([address]), Cataract, acquired, Chronic kidney disease, Coronary artery disease, Diabetes mellitus ([address]), Diabetic neuropathy ([address]), Enlarged prostate with lower urinary tract symptoms (LUTS), Erectile dysfunction, GERD (gastroesophageal reflux disease), Hemoptysis, Hypercholesterolemia, Hypertension, Kidney stone, Macular degeneration, Myocardial infarction ([address]), OAB (overactive bladder), Testicular hypofunction, Testicular hypogonadism, Tinnitus, Umbilical hernia, Urge incontinence of urine, and Wears glasses. At baseline pt was completing adls and mobility independently with occasional use of SPC - shares homemaking with spouse. Pt lives with wife in 2 story home with [facility] PR[facility]. Currently pt requires moderate assist for overall ADLS and min to mod assist for functional mobility/transfers. Pt currently presents with impairments in the following categories -steps to enter environment, difficulty performing ADLS, difficulty performing IADLS  and environment activity tolerance, endurance, standing balance/tolerance and sitting balance/tolerance. These impairments, as well as pt's fatigue, pain, risk for falls and home environment  limit pt's ability to safely engage in all baseline areas of occupation, includingbathing, dressing, toileting, functional mobility/transfers, community mobility, laundry , driving, house maintenance, meal prep, cleaning, social participation  and leisure activities  From OT standpoint, recommend inpt rehab upon D/C. OT will continue to follow to address the below stated goals. CARE SCORES:  Self Care:  Eatin:  Independent  Oral hygiene: 04: Supervision or touching  assistance  Toilet hygiene: 03: Partial/moderate assistance  Shower/bathing self: 03: Partial/moderate assistance  Upper body dressin: Partial/moderate assistance  Lower body dressin: Partial/moderate assistance  Putting on/taking off footwear: 09: Not applicable  Transfers:  Roll left and right: 09: Not applicable  Sit to lyin: Not applicable  Lying to sitting on side of bed: 03: Partial/moderate assistance  Sit to stand: 03: Partial/moderate assistance  Chair/bed to chair transfer: 03: Partial/moderate assistance  Toilet transfer: 09: Not applicable  Mobility:  Walk 10 ft: 03: Partial/moderate assistance  Walk 50 ft with two turns: 09: Not applicable  Walk 108GL: 09: Not applicable    CURRENT GAP IN FUNCTION  Prior to Admission: Functional Status: Patient ambulated with an AD at baseline. He occasionally needed assistance with his ADLs. Lives in a one story condo two steps to enter lives with his wife who can assist 24 seven they have a walk in shower with a portable shower chair, grab bars, a Rollator a single point cane. He was mod independent meeting assist with some ADLs at times not driving [employer] his first choice [facility] second. Expected functional outcomes: It is expected that with skilled acute rehabilitation services the patient will progress to Minimal Assistance for self care and Supervision for mobility     Estimated length of stay: 10 to 14 days    Anticipated Post-Discharge Disposition/Treatment  Disposition: Return to previous home/apartment. Outpatient Services: Physical Therapy (PT) and Occupational Therapy (OT)    BARRIERS TO DISCHARGE  Home Accessibility; Decreased strength;Decreased endurance; Impaired balance;Decreased mobility;Pain;Decreased ADL status; Decreased self-care trans;Decreased high-level ADLs    INTERVENTIONS FOR DISCHARGE  ADL retraining;Functional transfer training; Endurance training;Patient/family training;Equipment evaluation/education; Compensatory technique education; Activity engagement;LE strengthening/ROM; Elevations; Therapeutic exercise; Bed mobility;Gait training     REQUIRED THERAPY:  Patient will require PT and OT 90 minutes each per day, five days per week to achieve rehab goals.      REQUIRED FUNCTIONAL AND MEDICAL MANAGEMENT FOR INPATIENT REHABILITATION:  Skin:  There are no pressure sores currently, Patient requires close monitoring of skin for any breakdown secondary to decreased mobility, Pain Management: Overall pain is moderately controlled, Deep Vein Thrombosis (DVT) Prophylaxis:  low molecular weight heparin and SCD's while in bed, Diabetes Management: continue sliding scale insulin, patient to do finger sticks as ordered, SLIM to continue to manage diabetes, and other medical conditions while on ARC, PT and OT interventions, any labs, consults, imaging and medication adjustments patient may need while on ARC    RECOMMENDED LEVEL OF CARE:   This is an 80-year-old male who presented to [address] [place] [address] ER on 9/15/23 after a fall. Patient was moving something into his truck and fell backwards, hitting his head on the ground. He was found to have and acute multi-compartment hemorrhage (Subarachnoid hemorrhage and subdural hematoma); temporal bone fracture and small pneumocephalus. Neurosurgery was consulted and ordered 7 days Keppra for seizure ppx. They did not recommend any surgical intervention. He is being treated with Unasyn for his open temporal bone fracture. He had continued bleeding from right ear s/p administration of afrin. ENT was consulted and per consult-hearing loss in right ear- tuning fork exam consistent with conductive pattern secondary to debris in canal and middle ear. Outpatient follow-up was recommended with audiogram after discharge. He was also ordered Floxin drops twice daily 5 drops to right ear x 10 days due to debris in canal.  Patient also with new onset atrial flutter - Per cards consult, HR is well controlled. Continue beta-blocker. Anticoagulation is recommended for stroke prevention, but not able to start this yet due to bleed. Recommended starting Eliquis once cleared from a neurosurgical standpoint. He is to follow-up with neurosurgery after discharge. Patient has a prior history of CABG. Echo performed at the bedside showed some improvement of his EF to the low normal range.  From a cardiology standpoint, he is to continue all outpatient medical therapy. Patient also has a history of CKD stage IV-baseline creatinine around 2.5-2.8. Creatinine on 9/18-2.2. Patient worked with therapy and recommended for rehab following his hospital stay. He has demonstrated that he can tolerate three hours of therapy, five days a week and is now medically cleared for discharge to the [facility]. PTA, patient ambulated independently with an AD. He occasionally needed assistance with his ADLs. He is now functioning below his baseline, requiring supervision to moderate assistance with his ADLs. With PT, he is requiring min x 1 assistance for supine to sit bed mobility and mod x 1 assistance for sit to stand and stand to sit transfers. He ambulated 15 feet x 2 with RW and min x 1 assistance. Gait pattern: excessively slow, short stride, forward flexed and inconsistent santy. Patient requires close oversight by a physician, PM&R management, 24/7 rehabilitation nursing care and specialized interdisciplinary therapy services in preparation for discharge which can only be provided in the inpatient acute rehabilitation setting. Patient needs routing neuro checks as well as close monitoring of his VS, I/Os, skin, pain level, blood sugars and his overall condition. Inpatient acute rehabilitation is recommended for this patient to maximize his overall strength, endurance, self care and mobility upon discharge home with the support of his wife.

## 2023-09-19 NOTE — PLAN OF CARE
Problem: MOBILITY - ADULT  Goal: Maintain or return to baseline ADL function  Description: INTERVENTIONS:  -  Assess patient's ability to carry out ADLs; assess patient's baseline for ADL function and identify physical deficits which impact ability to perform ADLs (bathing, care of mouth/teeth, toileting, grooming, dressing, etc.)  - Assess/evaluate cause of self-care deficits   - Assess range of motion  - Assess patient's mobility; develop plan if impaired  - Assess patient's need for assistive devices and provide as appropriate  - Encourage maximum independence but intervene and supervise when necessary  - Involve family in performance of ADLs  - Assess for home care needs following discharge   - Consider OT consult to assist with ADL evaluation and planning for discharge  - Provide patient education as appropriate  Outcome: Progressing  Goal: Maintains/Returns to pre admission functional level  Description: INTERVENTIONS:  - Perform BMAT or MOVE assessment daily.   - Set and communicate daily mobility goal to care team and patient/family/caregiver.    - Collaborate with rehabilitation services on mobility goals if consulted  - Record patient progress and toleration of activity level   Outcome: Progressing     Problem: PAIN - ADULT  Goal: Verbalizes/displays adequate comfort level or baseline comfort level  Description: Interventions:  - Encourage patient to monitor pain and request assistance  - Assess pain using appropriate pain scale  - Administer analgesics based on type and severity of pain and evaluate response  - Implement non-pharmacological measures as appropriate and evaluate response  - Consider cultural and social influences on pain and pain management  - Notify physician/advanced practitioner if interventions unsuccessful or patient reports new pain  Outcome: Progressing     Problem: INFECTION - ADULT  Goal: Absence or prevention of progression during hospitalization  Description: INTERVENTIONS:  - Assess and monitor for signs and symptoms of infection  - Monitor lab/diagnostic results  - Monitor all insertion sites, i.e. indwelling lines, tubes, and drains  - Monitor endotracheal if appropriate and nasal secretions for changes in amount and color  - [place] appropriate cooling/warming therapies per order  - Administer medications as ordered  - Instruct and encourage patient and family to use good hand hygiene technique  - Identify and instruct in appropriate isolation precautions for identified infection/condition  Outcome: Progressing     Problem: SAFETY ADULT  Goal: Maintain or return to baseline ADL function  Description: INTERVENTIONS:  -  Assess patient's ability to carry out ADLs; assess patient's baseline for ADL function and identify physical deficits which impact ability to perform ADLs (bathing, care of mouth/teeth, toileting, grooming, dressing, etc.)  - Assess/evaluate cause of self-care deficits   - Assess range of motion  - Assess patient's mobility; develop plan if impaired  - Assess patient's need for assistive devices and provide as appropriate  - Encourage maximum independence but intervene and supervise when necessary  - Involve family in performance of ADLs  - Assess for home care needs following discharge   - Consider OT consult to assist with ADL evaluation and planning for discharge  - Provide patient education as appropriate  Outcome: Progressing  Goal: Maintains/Returns to pre admission functional level  Description: INTERVENTIONS:  - Perform BMAT or MOVE assessment daily.   - Set and communicate daily mobility goal to care team and patient/family/caregiver.    - Collaborate with rehabilitation services on mobility goals if consulted  - Record patient progress and toleration of activity level   Outcome: Progressing  Goal: Patient will remain free of falls  Description: INTERVENTIONS:  - Educate patient/family on patient safety including physical limitations  - Instruct patient to call for assistance with activity   - Consult OT/PT to assist with strengthening/mobility   - Keep Call bell within reach  - Keep bed low and locked with side rails adjusted as appropriate  - Keep care items and personal belongings within reach  - Initiate and maintain comfort rounds  - Make Fall Risk Sign visible to staff  - Apply yellow socks and bracelet for high fall risk patients  - Consider moving patient to room near nurses station  Outcome: Progressing     Problem: DISCHARGE PLANNING  Goal: Discharge to home or other facility with appropriate resources  Description: INTERVENTIONS:  - Identify barriers to discharge w/patient and caregiver  - Arrange for needed discharge resources and transportation as appropriate  - Identify discharge learning needs (meds, wound care, etc.)  - Arrange for interpretive services to assist at discharge as needed  - Refer to Case Management Department for coordinating discharge planning if the patient needs post-hospital services based on physician/advanced practitioner order or complex needs related to functional status, cognitive ability, or social support system  Outcome: Progressing     Problem: Knowledge Deficit  Goal: Patient/family/caregiver demonstrates understanding of disease process, treatment plan, medications, and discharge instructions  Description: Complete learning assessment and assess knowledge base.   Interventions:  - Provide teaching at level of understanding  - Provide teaching via preferred learning methods  Outcome: Progressing     Problem: Prexisting or High Potential for Compromised Skin Integrity  Goal: Skin integrity is maintained or improved  Description: INTERVENTIONS:  - Identify patients at risk for skin breakdown  - Assess and monitor skin integrity  - Assess and monitor nutrition and hydration status  - Monitor labs   - Assess for incontinence   - Turn and reposition patient  - Assist with mobility/ambulation  - Relieve pressure over bony prominences  - Avoid friction and shearing  - Provide appropriate hygiene as needed including keeping skin clean and dry  - Evaluate need for skin moisturizer/barrier cream  - Collaborate with interdisciplinary team   - Patient/family teaching  - Consider wound care consult   Outcome: Progressing     Problem: Nutrition/Hydration-ADULT  Goal: Nutrient/Hydration intake appropriate for improving, restoring or maintaining nutritional needs  Description: Monitor and assess patient's nutrition/hydration status for malnutrition. Collaborate with interdisciplinary team and initiate plan and interventions as ordered. Monitor patient's weight and dietary intake as ordered or per policy. Utilize nutrition screening tool and intervene as necessary. Determine patient's food preferences and provide high-protein, high-caloric foods as appropriate.      INTERVENTIONS:  - Monitor oral intake, urinary output, labs, and treatment plans  - Assess nutrition and hydration status and recommend course of action  - Evaluate amount of meals eaten  - Assist patient with eating if necessary   - Allow adequate time for meals  - Recommend/ encourage appropriate diets, oral nutritional supplements, and vitamin/mineral supplements  - Order, calculate, and assess calorie counts as needed  - Recommend, monitor, and adjust tube feedings and TPN/PPN based on assessed needs  - Assess need for intravenous fluids  - Provide specific nutrition/hydration education as appropriate  - Include patient/family/caregiver in decisions related to nutrition  Outcome: Progressing

## 2023-09-19 NOTE — PLAN OF CARE
Problem: MOBILITY - ADULT  Goal: Maintain or return to baseline ADL function  Description: INTERVENTIONS:  -  Assess patient's ability to carry out ADLs; assess patient's baseline for ADL function and identify physical deficits which impact ability to perform ADLs (bathing, care of mouth/teeth, toileting, grooming, dressing, etc.)  - Assess/evaluate cause of self-care deficits   - Assess range of motion  - Assess patient's mobility; develop plan if impaired  - Assess patient's need for assistive devices and provide as appropriate  - Encourage maximum independence but intervene and supervise when necessary  - Involve family in performance of ADLs  - Assess for home care needs following discharge   - Consider OT consult to assist with ADL evaluation and planning for discharge  - Provide patient education as appropriate  Outcome: Progressing  Goal: Maintains/Returns to pre admission functional level  Description: INTERVENTIONS:  - Perform BMAT or MOVE assessment daily.   - Set and communicate daily mobility goal to care team and patient/family/caregiver.    - Collaborate with rehabilitation services on mobility goals if consulted  - Record patient progress and toleration of activity level   Outcome: Progressing     Problem: PAIN - ADULT  Goal: Verbalizes/displays adequate comfort level or baseline comfort level  Description: Interventions:  - Encourage patient to monitor pain and request assistance  - Assess pain using appropriate pain scale  - Administer analgesics based on type and severity of pain and evaluate response  - Implement non-pharmacological measures as appropriate and evaluate response  - Consider cultural and social influences on pain and pain management  - Notify physician/advanced practitioner if interventions unsuccessful or patient reports new pain  Outcome: Progressing     Problem: INFECTION - ADULT  Goal: Absence or prevention of progression during hospitalization  Description: INTERVENTIONS:  - Assess and monitor for signs and symptoms of infection  - Monitor lab/diagnostic results  - Monitor all insertion sites, i.e. indwelling lines, tubes, and drains  - Monitor endotracheal if appropriate and nasal secretions for changes in amount and color  - [place] appropriate cooling/warming therapies per order  - Administer medications as ordered  - Instruct and encourage patient and family to use good hand hygiene technique  - Identify and instruct in appropriate isolation precautions for identified infection/condition  Outcome: Progressing     Problem: SAFETY ADULT  Goal: Maintain or return to baseline ADL function  Description: INTERVENTIONS:  -  Assess patient's ability to carry out ADLs; assess patient's baseline for ADL function and identify physical deficits which impact ability to perform ADLs (bathing, care of mouth/teeth, toileting, grooming, dressing, etc.)  - Assess/evaluate cause of self-care deficits   - Assess range of motion  - Assess patient's mobility; develop plan if impaired  - Assess patient's need for assistive devices and provide as appropriate  - Encourage maximum independence but intervene and supervise when necessary  - Involve family in performance of ADLs  - Assess for home care needs following discharge   - Consider OT consult to assist with ADL evaluation and planning for discharge  - Provide patient education as appropriate  Outcome: Progressing  Goal: Maintains/Returns to pre admission functional level  Description: INTERVENTIONS:  - Perform BMAT or MOVE assessment daily.   - Set and communicate daily mobility goal to care team and patient/family/caregiver.    - Collaborate with rehabilitation services on mobility goals if consulted  - Record patient progress and toleration of activity level   Outcome: Progressing  Goal: Patient will remain free of falls  Description: INTERVENTIONS:  - Educate patient/family on patient safety including physical limitations  - Instruct patient to call for assistance with activity   - Consult OT/PT to assist with strengthening/mobility   - Keep Call bell within reach  - Keep bed low and locked with side rails adjusted as appropriate  - Keep care items and personal belongings within reach  - Initiate and maintain comfort rounds  - Make Fall Risk Sign visible to staff  - Apply yellow socks and bracelet for high fall risk patients  - Consider moving patient to room near nurses station  Outcome: Progressing     Problem: DISCHARGE PLANNING  Goal: Discharge to home or other facility with appropriate resources  Description: INTERVENTIONS:  - Identify barriers to discharge w/patient and caregiver  - Arrange for needed discharge resources and transportation as appropriate  - Identify discharge learning needs (meds, wound care, etc.)  - Arrange for interpretive services to assist at discharge as needed  - Refer to Case Management Department for coordinating discharge planning if the patient needs post-hospital services based on physician/advanced practitioner order or complex needs related to functional status, cognitive ability, or social support system  Outcome: Progressing     Problem: Knowledge Deficit  Goal: Patient/family/caregiver demonstrates understanding of disease process, treatment plan, medications, and discharge instructions  Description: Complete learning assessment and assess knowledge base.   Interventions:  - Provide teaching at level of understanding  - Provide teaching via preferred learning methods  Outcome: Progressing     Problem: Prexisting or High Potential for Compromised Skin Integrity  Goal: Skin integrity is maintained or improved  Description: INTERVENTIONS:  - Identify patients at risk for skin breakdown  - Assess and monitor skin integrity  - Assess and monitor nutrition and hydration status  - Monitor labs   - Assess for incontinence   - Turn and reposition patient  - Assist with mobility/ambulation  - Relieve pressure over bony prominences  - Avoid friction and shearing  - Provide appropriate hygiene as needed including keeping skin clean and dry  - Evaluate need for skin moisturizer/barrier cream  - Collaborate with interdisciplinary team   - Patient/family teaching  - Consider wound care consult   Outcome: Progressing     Problem: Nutrition/Hydration-ADULT  Goal: Nutrient/Hydration intake appropriate for improving, restoring or maintaining nutritional needs  Description: Monitor and assess patient's nutrition/hydration status for malnutrition. Collaborate with interdisciplinary team and initiate plan and interventions as ordered. Monitor patient's weight and dietary intake as ordered or per policy. Utilize nutrition screening tool and intervene as necessary. Determine patient's food preferences and provide high-protein, high-caloric foods as appropriate.      INTERVENTIONS:  - Monitor oral intake, urinary output, labs, and treatment plans  - Assess nutrition and hydration status and recommend course of action  - Evaluate amount of meals eaten  - Assist patient with eating if necessary   - Allow adequate time for meals  - Recommend/ encourage appropriate diets, oral nutritional supplements, and vitamin/mineral supplements  - Order, calculate, and assess calorie counts as needed  - Recommend, monitor, and adjust tube feedings and TPN/PPN based on assessed needs  - Assess need for intravenous fluids  - Provide specific nutrition/hydration education as appropriate  - Include patient/family/caregiver in decisions related to nutrition  Outcome: Progressing

## 2023-09-19 NOTE — PROGRESS NOTES
NEPHROLOGY PROGRESS NOTE   Jairo Geronimo 80 y.o. male MRN: [id number]  Unit/Bed#: [facility] 603-01 Encounter: [id number]      ASSESSMENT/PLAN:  1. CKD stage IV: Baseline creatinine 2.5-2.8 and follows with Dr. Karo Montenegro.  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 Status post fall complicated by temporal bone fracture, subdural hematoma and subarachnoid hemorrhage  3. Hypertension: BP improving  · Started on amlodipine 5 mg daily yesterday  4. Low bicarb: bicarb 18. Suspect metabolic acidosis from CKD   · Resume home sodium bicarb 1300 mg p.o. twice daily  5. Secondary hyperparathyroidism  · On outpatient calcitriol 0.25mcg twice a week  6. Chronic CHF with an EF 50%  · On torsemide 20 mg every other day as an outpatient-- will restart   7. History of bladder cancer status post radical cystoprostatectomy with ileal conduit    Plan Summary:   • Restart home sodium bicarb 1300mg po bid   • Restart home torsemide 20mg po every other day   • Check am BMP   • Discussed above plan with trauma team who agree with restarting home diuretics    • Ok to d/c from renal standpoint when cleared by primary team -- working on rehab placement     SUBJECTIVE:  Feeling okay. Complains of shortness of breath this morning but thinks it is related to nasal congestion. Complains of his ostomy bag bursting this morning.     OBJECTIVE:  Current Weight: Weight - Scale: 83.5 kg (184 lb)  Vitals:    09/19/23 0717   BP: 148/94   Pulse: 66   Resp: 17   Temp: 97.9 °F (36.6 °C)   SpO2: 99%       Intake/Output Summary (Last 24 hours) at 9/19/2023 0954  Last data filed at 9/19/2023 0695  Gross per 24 hour   Intake 678 ml   Output 1650 ml   Net -972 ml       General:  appears comfortable and in no acute distress   Skin:  No rash  Eyes:  Sclerae anicteric, no periorbital edema   ENT:  Moist mucous membranes  Neck:  Trachea midline, symmetric   Chest:  Clear to auscultation bilaterally with no wheezes, rales or rhonchi  CVS:  Regular rate and rhythm  Abdomen:  Soft, nontender, nondistended  Neuro:  Awake and alert  Psych:  Appropriate affect  Extremities: no lower extremity edema     Medications:  Scheduled Meds:  Current Facility-Administered Medications   Medication Dose Route Frequency Provider Last Rate   • acetaminophen  975 mg Oral [facility] Tuesday M LUKE Robertson     • amLODIPine  5 mg Oral Daily Dhaval Woodie Mortimer, MD     • ampicillin-sulbactam  3 g Intravenous Q12H Janee Knowles MD 3 g (09/18/23 2137)   • atorvastatin  40 mg Oral HS Janee Knowles MD     • citalopram  20 mg Oral Daily Janee Knowles MD     • enoxaparin  30 mg Subcutaneous Q24H [facility] Reena Gandhi PA-C     • HYDROmorphone  0.2 mg Intravenous Q6H PRN Janee Knowles MD     • insulin lispro  1-6 Units Subcutaneous TID [facility] Janee Knowles MD     • levETIRAcetam  500 mg Intravenous Q12H [facility] Reena Gandhi PA-C 500 mg (09/19/23 4037)   • ofloxacin  5 drop Right Eye 4x Daily Phillip Many     • ondansetron  4 mg Intravenous Q6H PRN Kb Monterroso PA-C     • oxyCODONE  5 mg Oral Q8H PRN Janee Knowles MD     • oxyCODONE  2.5 mg Oral Q8H PRN Janee Knowles MD     • pantoprazole  40 mg Oral Early Morning Janee Knowles MD     • polyethylene glycol  17 g Oral Daily PRN Tuesday M LUKE Robertson     • polyethylene glycol  17 g Oral Once Tuesday M LUKE Robertson     • senna-docusate sodium  1 tablet Oral HS LUKE York         PRN Meds:.•  HYDROmorphone  •  ondansetron  •  oxyCODONE  •  oxyCODONE  • polyethylene glycol    Laboratory Results:  Results from last 7 days   Lab Units 09/19/23  0552 09/18/23  0525 09/17/23  0446 09/15/23  1306   WBC Thousand/uL  --   --   --  15.04*   HEMOGLOBIN g/dL  --   --   --  10.3*   HEMATOCRIT %  --   --   --  31.4*   PLATELETS Thousands/uL  --   --   --  233   SODIUM mmol/L 136 135 136 134*   POTASSIUM mmol/L 3.7 3.9 3.9 3.8   CHLORIDE mmol/L 108 106 107 103   CO2 mmol/L 18* 21 20* 16*   BUN mg/dL 36* 40* 44* 57*   CREATININE mg/dL 2.29* 2.23* 2.45* 2.28*   CALCIUM mg/dL 7.9* 8.3* 8.1* 8.5

## 2023-09-19 NOTE — PROGRESS NOTES
-- Patient: Gerarda Runner  -- MRN: [id number]  -- Aidin Request ID: [id number]  -- Level of care reserved: Inpatient [address] [place] [address]  -- Partner Reserved: [facility] ([place]), [facility], [address] [facility] [phone]  -- Clinical needs requested:  -- Geography searched: 10 miles around [facility]  -- Start of Service:  -- Request sent: 12:34pm EDT on 9/18/2023 by Francisca Licea  -- Partner reserved: 10:18am EDT on 9/19/2023 by Francisca Licea  -- Choice list shared:

## 2023-09-20 ENCOUNTER — TELEPHONE (OUTPATIENT)
Dept: NEPHROLOGY | Facility: CLINIC | Age: 83
End: 2023-09-20

## 2023-09-20 ENCOUNTER — TELEPHONE (OUTPATIENT)
Age: 83
End: 2023-09-20

## 2023-09-20 ENCOUNTER — HOME HEALTH ADMISSION (OUTPATIENT)
Dept: HOME HEALTH SERVICES | Facility: HOME HEALTHCARE | Age: 83
End: 2023-09-20

## 2023-09-20 ENCOUNTER — PATIENT OUTREACH (OUTPATIENT)
Dept: CASE MANAGEMENT | Facility: OTHER | Age: 83
End: 2023-09-20

## 2023-09-20 DIAGNOSIS — N18.4 STAGE 4 CHRONIC KIDNEY DISEASE (HCC): Primary | ICD-10-CM

## 2023-09-20 PROBLEM — R09.81 SINUS CONGESTION: Status: ACTIVE | Noted: 2023-09-20

## 2023-09-20 PROBLEM — G47.00 INSOMNIA: Status: ACTIVE | Noted: 2023-09-20

## 2023-09-20 PROBLEM — Z91.89 AT RISK FOR VENOUS THROMBOEMBOLISM (VTE): Status: ACTIVE | Noted: 2023-09-20

## 2023-09-20 LAB
25(OH)D3 SERPL-MCNC: 41.9 NG/ML (ref 30–100)
ALBUMIN SERPL BCP-MCNC: 3.5 G/DL (ref 3.5–5)
ALP SERPL-CCNC: 74 U/L (ref 34–104)
ALT SERPL W P-5'-P-CCNC: 8 U/L (ref 7–52)
ANION GAP SERPL CALCULATED.3IONS-SCNC: 12 MMOL/L
AST SERPL W P-5'-P-CCNC: 10 U/L (ref 13–39)
BACTERIA UR QL AUTO: ABNORMAL /HPF
BASOPHILS # BLD AUTO: 0.05 THOUSANDS/ÂΜL (ref 0–0.1)
BASOPHILS NFR BLD AUTO: 0 % (ref 0–1)
BILIRUB SERPL-MCNC: 0.54 MG/DL (ref 0.2–1)
BILIRUB UR QL STRIP: NEGATIVE
BUN SERPL-MCNC: 30 MG/DL (ref 5–25)
CALCIUM SERPL-MCNC: 8.5 MG/DL (ref 8.4–10.2)
CHLORIDE SERPL-SCNC: 104 MMOL/L (ref 96–108)
CLARITY UR: CLEAR
CO2 SERPL-SCNC: 20 MMOL/L (ref 21–32)
COLOR UR: ABNORMAL
CREAT SERPL-MCNC: 2.04 MG/DL (ref 0.6–1.3)
EOSINOPHIL # BLD AUTO: 0.07 THOUSAND/ÂΜL (ref 0–0.61)
EOSINOPHIL NFR BLD AUTO: 1 % (ref 0–6)
ERYTHROCYTE [DISTWIDTH] IN BLOOD BY AUTOMATED COUNT: 16.6 % (ref 11.6–15.1)
FERRITIN SERPL-MCNC: 131 NG/ML (ref 24–336)
FOLATE SERPL-MCNC: 8 NG/ML
GFR SERPL CREATININE-BSD FRML MDRD: 29 ML/MIN/1.73SQ M
GLUCOSE SERPL-MCNC: 137 MG/DL (ref 65–140)
GLUCOSE SERPL-MCNC: 142 MG/DL (ref 65–140)
GLUCOSE SERPL-MCNC: 142 MG/DL (ref 65–140)
GLUCOSE SERPL-MCNC: 146 MG/DL (ref 65–140)
GLUCOSE SERPL-MCNC: 167 MG/DL (ref 65–140)
GLUCOSE UR STRIP-MCNC: NEGATIVE MG/DL
HCT VFR BLD AUTO: 31.1 % (ref 36.5–49.3)
HGB BLD-MCNC: 10.2 G/DL (ref 12–17)
HGB UR QL STRIP.AUTO: 25
IMM GRANULOCYTES # BLD AUTO: 0.12 THOUSAND/UL (ref 0–0.2)
IMM GRANULOCYTES NFR BLD AUTO: 1 % (ref 0–2)
IRON SATN MFR SERPL: 14 % (ref 15–50)
IRON SERPL-MCNC: 30 UG/DL (ref 50–212)
KETONES UR STRIP-MCNC: NEGATIVE MG/DL
LEUKOCYTE ESTERASE UR QL STRIP: NEGATIVE
LYMPHOCYTES # BLD AUTO: 0.63 THOUSANDS/ÂΜL (ref 0.6–4.47)
LYMPHOCYTES NFR BLD AUTO: 5 % (ref 14–44)
MAGNESIUM SERPL-MCNC: 1.9 MG/DL (ref 1.9–2.7)
MCH RBC QN AUTO: 29.9 PG (ref 26.8–34.3)
MCHC RBC AUTO-ENTMCNC: 32.8 G/DL (ref 31.4–37.4)
MCV RBC AUTO: 91 FL (ref 82–98)
MONOCYTES # BLD AUTO: 0.93 THOUSAND/ÂΜL (ref 0.17–1.22)
MONOCYTES NFR BLD AUTO: 8 % (ref 4–12)
NEUTROPHILS # BLD AUTO: 10.19 THOUSANDS/ÂΜL (ref 1.85–7.62)
NEUTS SEG NFR BLD AUTO: 85 % (ref 43–75)
NITRITE UR QL STRIP: NEGATIVE
NON-SQ EPI CELLS URNS QL MICRO: ABNORMAL /HPF
NRBC BLD AUTO-RTO: 0 /100 WBCS
PH UR STRIP.AUTO: 7 [PH]
PLATELET # BLD AUTO: 228 THOUSANDS/UL (ref 149–390)
PMV BLD AUTO: 11.4 FL (ref 8.9–12.7)
POTASSIUM SERPL-SCNC: 3.7 MMOL/L (ref 3.5–5.3)
PROT SERPL-MCNC: 6.6 G/DL (ref 6.4–8.4)
PROT UR STRIP-MCNC: ABNORMAL MG/DL
RBC # BLD AUTO: 3.41 MILLION/UL (ref 3.88–5.62)
RBC #/AREA URNS AUTO: ABNORMAL /HPF
SODIUM SERPL-SCNC: 136 MMOL/L (ref 135–147)
SP GR UR STRIP.AUTO: 1.01 (ref 1–1.04)
TIBC SERPL-MCNC: 215 UG/DL (ref 250–450)
UIBC SERPL-MCNC: 185 UG/DL (ref 155–355)
UROBILINOGEN UA: NEGATIVE MG/DL
VIT B12 SERPL-MCNC: 1238 PG/ML (ref 180–914)
WBC # BLD AUTO: 11.99 THOUSAND/UL (ref 4.31–10.16)
WBC #/AREA URNS AUTO: ABNORMAL /HPF

## 2023-09-20 PROCEDURE — 97129 THER IVNTJ 1ST 15 MIN: CPT

## 2023-09-20 PROCEDURE — 97130 THER IVNTJ EA ADDL 15 MIN: CPT

## 2023-09-20 PROCEDURE — 85025 COMPLETE CBC W/AUTO DIFF WBC: CPT | Performed by: NURSE PRACTITIONER

## 2023-09-20 PROCEDURE — 97163 PT EVAL HIGH COMPLEX 45 MIN: CPT

## 2023-09-20 PROCEDURE — 83550 IRON BINDING TEST: CPT | Performed by: NURSE PRACTITIONER

## 2023-09-20 PROCEDURE — 81001 URINALYSIS AUTO W/SCOPE: CPT | Performed by: NURSE PRACTITIONER

## 2023-09-20 PROCEDURE — 83540 ASSAY OF IRON: CPT | Performed by: NURSE PRACTITIONER

## 2023-09-20 PROCEDURE — 82306 VITAMIN D 25 HYDROXY: CPT | Performed by: NURSE PRACTITIONER

## 2023-09-20 PROCEDURE — 82746 ASSAY OF FOLIC ACID SERUM: CPT | Performed by: NURSE PRACTITIONER

## 2023-09-20 PROCEDURE — 83735 ASSAY OF MAGNESIUM: CPT | Performed by: NURSE PRACTITIONER

## 2023-09-20 PROCEDURE — 82607 VITAMIN B-12: CPT | Performed by: NURSE PRACTITIONER

## 2023-09-20 PROCEDURE — 80053 COMPREHEN METABOLIC PANEL: CPT | Performed by: NURSE PRACTITIONER

## 2023-09-20 PROCEDURE — 82948 REAGENT STRIP/BLOOD GLUCOSE: CPT

## 2023-09-20 PROCEDURE — 82728 ASSAY OF FERRITIN: CPT | Performed by: NURSE PRACTITIONER

## 2023-09-20 PROCEDURE — 97116 GAIT TRAINING THERAPY: CPT

## 2023-09-20 PROCEDURE — 93005 ELECTROCARDIOGRAM TRACING: CPT

## 2023-09-20 PROCEDURE — 99232 SBSQ HOSP IP/OBS MODERATE 35: CPT

## 2023-09-20 PROCEDURE — 97530 THERAPEUTIC ACTIVITIES: CPT

## 2023-09-20 PROCEDURE — 97167 OT EVAL HIGH COMPLEX 60 MIN: CPT

## 2023-09-20 PROCEDURE — 99222 1ST HOSP IP/OBS MODERATE 55: CPT | Performed by: NURSE PRACTITIONER

## 2023-09-20 PROCEDURE — 97535 SELF CARE MNGMENT TRAINING: CPT

## 2023-09-20 PROCEDURE — 92523 SPEECH SOUND LANG COMPREHEN: CPT

## 2023-09-20 RX ORDER — POLYETHYLENE GLYCOL 3350 17 G/17G
17 POWDER, FOR SOLUTION ORAL DAILY
Status: DISCONTINUED | OUTPATIENT
Start: 2023-09-20 | End: 2023-09-20

## 2023-09-20 RX ORDER — LANOLIN ALCOHOL/MO/W.PET/CERES
3 CREAM (GRAM) TOPICAL
Status: DISCONTINUED | OUTPATIENT
Start: 2023-09-20 | End: 2023-09-23

## 2023-09-20 RX ORDER — POLYETHYLENE GLYCOL 3350 17 G/17G
17 POWDER, FOR SOLUTION ORAL ONCE
Status: COMPLETED | OUTPATIENT
Start: 2023-09-20 | End: 2023-09-20

## 2023-09-20 RX ORDER — BISACODYL 10 MG
10 SUPPOSITORY, RECTAL RECTAL ONCE
Status: COMPLETED | OUTPATIENT
Start: 2023-09-20 | End: 2023-09-20

## 2023-09-20 RX ORDER — SACCHAROMYCES BOULARDII 250 MG
250 CAPSULE ORAL 2 TIMES DAILY
Status: DISCONTINUED | OUTPATIENT
Start: 2023-09-20 | End: 2023-09-29 | Stop reason: HOSPADM

## 2023-09-20 RX ORDER — POLYETHYLENE GLYCOL 3350 17 G/17G
17 POWDER, FOR SOLUTION ORAL DAILY
Status: DISCONTINUED | OUTPATIENT
Start: 2023-09-21 | End: 2023-09-25

## 2023-09-20 RX ORDER — LANOLIN ALCOHOL/MO/W.PET/CERES
400 CREAM (GRAM) TOPICAL DAILY
Status: DISCONTINUED | OUTPATIENT
Start: 2023-09-20 | End: 2023-09-29 | Stop reason: HOSPADM

## 2023-09-20 RX ORDER — SENNOSIDES 8.6 MG
1 TABLET ORAL 2 TIMES DAILY
Status: DISCONTINUED | OUTPATIENT
Start: 2023-09-20 | End: 2023-09-29 | Stop reason: HOSPADM

## 2023-09-20 RX ORDER — AMPICILLIN AND SULBACTAM 1; .5 G/1; G/1
INJECTION, POWDER, FOR SOLUTION INTRAMUSCULAR; INTRAVENOUS
Status: DISPENSED
Start: 2023-09-20 | End: 2023-09-20

## 2023-09-20 RX ORDER — FLUTICASONE PROPIONATE 50 MCG
1 SPRAY, SUSPENSION (ML) NASAL DAILY
Status: DISCONTINUED | OUTPATIENT
Start: 2023-09-20 | End: 2023-09-29 | Stop reason: HOSPADM

## 2023-09-20 RX ORDER — LORATADINE 10 MG/1
10 TABLET ORAL DAILY
Status: DISCONTINUED | OUTPATIENT
Start: 2023-09-20 | End: 2023-09-25

## 2023-09-20 RX ADMIN — METOPROLOL TARTRATE 12.5 MG: 25 TABLET, FILM COATED ORAL at 20:38

## 2023-09-20 RX ADMIN — OFLOXACIN 5 DROP: 3 SOLUTION OPHTHALMIC at 08:16

## 2023-09-20 RX ADMIN — POLYETHYLENE GLYCOL 3350 17 G: 17 POWDER, FOR SOLUTION ORAL at 09:57

## 2023-09-20 RX ADMIN — HEPARIN SODIUM 5000 UNITS: 5000 INJECTION INTRAVENOUS; SUBCUTANEOUS at 22:11

## 2023-09-20 RX ADMIN — AMLODIPINE BESYLATE 5 MG: 5 TABLET ORAL at 08:15

## 2023-09-20 RX ADMIN — ONDANSETRON 4 MG: 4 TABLET, ORALLY DISINTEGRATING ORAL at 17:08

## 2023-09-20 RX ADMIN — MELATONIN TAB 3 MG 3 MG: 3 TAB at 22:11

## 2023-09-20 RX ADMIN — HYDRALAZINE HYDROCHLORIDE 10 MG: 10 TABLET, FILM COATED ORAL at 13:30

## 2023-09-20 RX ADMIN — BISACODYL 10 MG: 10 SUPPOSITORY RECTAL at 15:00

## 2023-09-20 RX ADMIN — Medication 250 MG: at 17:08

## 2023-09-20 RX ADMIN — FLUTICASONE PROPIONATE 1 SPRAY: 50 SPRAY, METERED NASAL at 13:31

## 2023-09-20 RX ADMIN — ACETAMINOPHEN 650 MG: 325 TABLET ORAL at 06:21

## 2023-09-20 RX ADMIN — ACETAMINOPHEN 650 MG: 325 TABLET ORAL at 22:13

## 2023-09-20 RX ADMIN — SENNOSIDES 8.6 MG: 8.6 TABLET, FILM COATED ORAL at 13:01

## 2023-09-20 RX ADMIN — AMPICILLIN SODIUM AND SULBACTAM SODIUM 3 G: 2; 1 INJECTION, POWDER, FOR SOLUTION INTRAMUSCULAR; INTRAVENOUS at 01:40

## 2023-09-20 RX ADMIN — AMPICILLIN SODIUM AND SULBACTAM SODIUM 3 G: 2; 1 INJECTION, POWDER, FOR SOLUTION INTRAMUSCULAR; INTRAVENOUS at 22:11

## 2023-09-20 RX ADMIN — LORATADINE 10 MG: 10 TABLET ORAL at 13:30

## 2023-09-20 RX ADMIN — AMLODIPINE BESYLATE 5 MG: 5 TABLET ORAL at 20:38

## 2023-09-20 RX ADMIN — CITALOPRAM HYDROBROMIDE 20 MG: 20 TABLET ORAL at 08:15

## 2023-09-20 RX ADMIN — MAGNESIUM OXIDE TAB 400 MG (241.3 MG ELEMENTAL MG) 400 MG: 400 (241.3 MG) TAB at 11:38

## 2023-09-20 RX ADMIN — SODIUM BICARBONATE 650 MG TABLET 1300 MG: at 08:16

## 2023-09-20 RX ADMIN — HEPARIN SODIUM 5000 UNITS: 5000 INJECTION INTRAVENOUS; SUBCUTANEOUS at 15:00

## 2023-09-20 RX ADMIN — HEPARIN SODIUM 5000 UNITS: 5000 INJECTION INTRAVENOUS; SUBCUTANEOUS at 05:58

## 2023-09-20 RX ADMIN — SENNOSIDES 8.6 MG: 8.6 TABLET, FILM COATED ORAL at 17:08

## 2023-09-20 RX ADMIN — SODIUM BICARBONATE 650 MG TABLET 1300 MG: at 17:08

## 2023-09-20 RX ADMIN — ATORVASTATIN CALCIUM 40 MG: 40 TABLET, FILM COATED ORAL at 22:12

## 2023-09-20 RX ADMIN — LEVETIRACETAM 500 MG: 500 TABLET, FILM COATED ORAL at 08:15

## 2023-09-20 RX ADMIN — ONDANSETRON 4 MG: 4 TABLET, ORALLY DISINTEGRATING ORAL at 07:04

## 2023-09-20 RX ADMIN — LEVETIRACETAM 500 MG: 500 TABLET, FILM COATED ORAL at 22:12

## 2023-09-20 RX ADMIN — AMPICILLIN SODIUM AND SULBACTAM SODIUM 3 G: 2; 1 INJECTION, POWDER, FOR SOLUTION INTRAMUSCULAR; INTRAVENOUS at 09:57

## 2023-09-20 RX ADMIN — METOPROLOL TARTRATE 12.5 MG: 25 TABLET, FILM COATED ORAL at 08:15

## 2023-09-20 RX ADMIN — HYDRALAZINE HYDROCHLORIDE 10 MG: 10 TABLET, FILM COATED ORAL at 20:33

## 2023-09-20 RX ADMIN — PANTOPRAZOLE SODIUM 40 MG: 40 TABLET, DELAYED RELEASE ORAL at 05:58

## 2023-09-20 RX ADMIN — INSULIN LISPRO 1 UNITS: 100 INJECTION, SOLUTION INTRAVENOUS; SUBCUTANEOUS at 12:40

## 2023-09-20 NOTE — ASSESSMENT & PLAN NOTE
IM consulted and with overall management at their discretion during ARC course who has now c/s'd Cards  holding MTP to avoid AV josh blocker to avoid harmony  Rate control:  None  Antithrombotic: None - consider NOAC when cleared by NSx  Follow-up with OP Cards

## 2023-09-20 NOTE — ASSESSMENT & PLAN NOTE
Lab Results   Component Value Date    HGBA1C 6.3 06/05/2023       Recent Labs     09/27/23  1608 09/28/23  0624 09/28/23  1612 09/29/23  0548   POCGLU 148* 129 156* 110       Blood Sugar Average: Last 72 hrs:  (P) [id number]     Internal medicine consulted and management at their discretion  Monitor for signs and symptoms of hypoglycemia   Current meds: None recently in [facility] but ok to go back on home Farxiga per IM-AP Merdis Altes    consistent carb diabetic diet

## 2023-09-20 NOTE — ASSESSMENT & PLAN NOTE
Noted to be in a-flutter in acute setting. ECHO obtained on 9/16: EF 50-55%, abnormal diastolic inflow due to atrial flutter, L atrium moderately dilated, and R atrium mildly dilated. Monitor routine VS.  Replete electrolytes as needed. Repeat Mg level due to taking magnesium supplements at home and not currently receiving. Obtain repeat EKG - last EKG showed QTc of 526. Currently not taking anticoagulation - [id number] score of 8. May benefit from anticoagulation - consider starting after follow-up with Neurosurgery. Follows with Dr. Brooke Lemos (Cardiology) as outpatient.

## 2023-09-20 NOTE — TELEPHONE ENCOUNTER
----- Message from Cecy Parsons MD sent at 9/20/2023  8:02 AM EDT -----  He got discharged from [address] [place] [address] and transferred to [facility] for rehab. He can continue his routine office appointment with Dr. Yadira Mccabe as scheduled. Please have him do repeat BMP before his office visit.

## 2023-09-20 NOTE — ASSESSMENT & PLAN NOTE
Difficulty sleeping in the hospital setting. Has taken melatonin in the past.  Will start on 3mg at HS tonight.

## 2023-09-20 NOTE — PCC PHYSICAL THERAPY
Pt 80 yr old male sp fall moving item into his truck, +head strike, +temp bone fx, dx +SDH, SAH, +R ear bleeding. PMH: macular generation, CKD, CAD, BPH with h/o radical csytoprostectomy with illeal conduit (x3 yrs ago), DM, Mi. At baseline pt was generally fully I with use of SPC outdoors PRN, non  due to visual deficits. Has RW and transport chair. Lives with spouse in a multi-level condo, with main floor bed/bathroom, 1+1 no HR NANO from front, pt usually uses 3 NANO from garage L HR, R grab bar. Pt reports being forgetful and really not feeling motivated or not having much energy to do thing recently, C/o severe nausea this AM, +emesis prior to PT eval, reports having no appetite. Pt presents cooperative and somewhat engaging in PT eval, requiring min/mod A for balance due to variable posterior lean. Pt reports he generally falls backward. Extensive directional cues needed due baseline to visual deficits. Use of Rw t/o session for inc balance and safety, pt able to progress amb distance with chair pulled behind. Pt to benefit from skilled PT intervention to maxmize functional balance and safety. Pt demonstrates good rehab potential to reach S/mod I goals with LRAD pending balance and progress for safe DC home with family support with likely home PT to follow in [facility] 10-14 days. 9/27/23  Pt making good progress towards set S goals, would benefit from short FT to update spouse on pt status and mobility. Anticipate DC home in next few days pending medical stability and agreement. Pt has RW, OT to order [facility].

## 2023-09-20 NOTE — ASSESSMENT & PLAN NOTE
CTA chest/abd/pelvis on 9/15 with multiple concerning lymph nodes/lesions for metastasis including: nodule at the L obturator internus muscle, L sided mesenteric lymph nodes, R external iliac lymph node, R sided retroperitoneal lymph nodes, hypodense structure along with R pelvic sidewall, lesions in the R inferior hepatic lobe, peritoneal soft tissue nodularity, R cardiophrenic lymph node, 2 enlarged retrocrural lymph nodes, and R middle lobe nodule.   Follow-up with Hematology/Oncology after d/c planned for late this week

## 2023-09-20 NOTE — ASSESSMENT & PLAN NOTE
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 Underwent radical cystoprostatectomy with ileal conduit at [facility]. Recurrence in 2022 and was started chemotherapy. Currently receiving Avelunab and aranesp for 3 months. Follows with Dr. Arturo Overton (Hem/Onc) as outpatient - due for appt on 9/26. Ordered PET scan for 9/19 but currently inpatient. CT chest/abd/pelvis in acute setting showed multiple concerns for metastasis. Follow-up with Hem/Onc as outpatient.

## 2023-09-20 NOTE — ASSESSMENT & PLAN NOTE
IM consulted and with overall management at their discretion during ARC course  Antithrombotic: Currently none (was not on in OP setting) - consider after clearance with NSx   Statin  Optimal blood pressure and blood sugar control

## 2023-09-20 NOTE — ASSESSMENT & PLAN NOTE
Lab Results   Component Value Date    EGFR 24 09/28/2023    EGFR 29 09/25/2023    EGFR 29 09/20/2023    CREATININE 2.38 (H) 09/28/2023    CREATININE 2.05 (H) 09/25/2023    CREATININE 2.04 (H) 09/20/2023   Improved BP control  Cards and Internal medicine consulted and co-management with their service  Monitor vitals with and without activity; monitor for orthostasis  Monitor hemoglobin, electrolytes, kidney function, hydration status   Per IM  "Home regimen: losartan 25mg daily, metoprolol tartrate 25mg Q12, and torsemide 20mg EOD. Currently receiving amlodipine 5mg daily, torsemide 20mg EOD, and hydralazine 10mg TID. Metoprolol currently on hold due to prolonged QTc and bradycardia. Maintain SBP <160 due to recent intracranial bleed and >140 due to renal function.   Continue to monitor BP with routine VS.  Follows with Dr. Milton Diaz (Cardiology) and Dr. Ginger Garcia (Nephrology) as outpatient."  - Patient has actually been on amlodipine 5mg BID and been stable on this which we will continue along with torsemide 20mg QOD and hydralazine 10mg TID

## 2023-09-20 NOTE — ASSESSMENT & PLAN NOTE
Pneumocephalus within the sinus near the area of the fracture seen on CT. Consider obtaining CTA/CTV if concerns for sinus thrombosis.

## 2023-09-20 NOTE — ASSESSMENT & PLAN NOTE
Celexa  Supportive counseling  Psychology consult while in [address] on and continue to encourage deep breathing/relaxation/behavioral management techniques

## 2023-09-20 NOTE — PROGRESS NOTES
Outpatient Care Management KO/SNF Pathway. Discharged 9/19/23 to [facility]. This Admin Coordinator will continue to monitor via chart review.

## 2023-09-20 NOTE — ASSESSMENT & PLAN NOTE
Experienced a-flutter in acute setting. ECHO on 9/16 showed EF 50-55%, abnormal diastolic inflow due to atrial flutter, L atrium moderately dilated, and R atrium mildly dilated. Continue with metoprolol tartrate 12.5mg Q12. Follows with Dr. Mariposa Stephens (Cardiology) as outpatient.

## 2023-09-20 NOTE — ASSESSMENT & PLAN NOTE
Chronic. Uses over the counter nasal sprays and antihistamines. Started on Claritin and Flonase today. Encouraged to follow-up with ENT as outpatient.

## 2023-09-20 NOTE — ASSESSMENT & PLAN NOTE
S/p fall on 9/15. [facility] on 9/15 showed multicompartmental hemorrhage with small foci of subarachnoid hemorrhage in the bilateral inferior frontal region and additional small foci of subarachnoid hemorrhage. Repeat CTH on 9/17 stable. Hold AC/AP. Has been restarted on DVT ppx per Neurosurgery. Neurovascular checks Q shift. Maintain SBP <160. Continue Keppra for 7 days for seizure ppx. Follow-up with Neurovascular in 2 weeks with repeat CTH (9/29). Primary team following. PT/OT.

## 2023-09-20 NOTE — PLAN OF CARE
Problem: NEUROSENSORY - ADULT  Goal: Remains free of injury related to seizures activity  Description: INTERVENTIONS  - Maintain airway, patient safety  and administer oxygen as ordered  - Monitor patient for seizure activity, document and report duration and description of seizure to physician/advanced practitioner  - If seizure occurs,  ensure patient safety during seizure  - Reorient patient post seizure  - Seizure pads on all 4 side rails  - Instruct patient/family to notify RN of any seizure activity including if an aura is experienced  - Instruct patient/family to call for assistance with activity based on nursing assessment  - Administer anti-seizure medications if ordered    Outcome: Progressing     Problem: GASTROINTESTINAL - ADULT  Goal: Maintains or returns to baseline bowel function  Description: INTERVENTIONS:  - Assess bowel function  - Encourage oral fluids to ensure adequate hydration  - Administer IV fluids if ordered to ensure adequate hydration  - Administer ordered medications as needed  - Encourage mobilization and activity  - Consider nutritional services referral to assist patient with adequate nutrition and appropriate food choices  Outcome: Progressing     Problem: METABOLIC, FLUID AND ELECTROLYTES - ADULT  Goal: Glucose maintained within target range  Description: INTERVENTIONS:  - Monitor Blood Glucose as ordered  - Assess for signs and symptoms of hyperglycemia and hypoglycemia  - Administer ordered medications to maintain glucose within target range  - Assess nutritional intake and initiate nutrition service referral as needed  Outcome: Progressing

## 2023-09-20 NOTE — H&P
PHYSICAL MEDICINE AND REHABILITATION H&P/ADMISSION NOTE  Esteban Puentes 80 y.o. 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 Patient was evaluated by neurosurgery recommended nonoperative management with seizure prophylaxis and repeat CT head in 2 weeks. Patient evaluated by ENT and also recommended nonoperative management. He did note some hearing loss and recommended follow-up with them which also should include an audiogram.  He was recommended Unasyn by trauma as well as Floxin drops in his right ear. Course notable for new onset A-flutter. Full anticoagulation and antiplatelets have been on hold due to bleed. He may require Eliquis in the future. Patient evaluated by skilled therapies and noted have significant decline in ADLs and mobility and appears appropriate for admission to acute rehab at this time. Chief complaint:   Headache     Subjective: On eval, patient notes some headache and facial pain since fall. He denies acute worsening. He notes impaired appetite, occasional nausea. Patient denies vertigo. He does note some dizziness at times. Patient denies focal changes in strength or sensation, fever, chills, sweats, abdominal pain, calf pain, shortness of breath, or other new complaints.      Review of Systems: A 10 point ROS was performed; negative except as noted above. * SAH (subarachnoid hemorrhage) ([address])  Assessment & Plan  - Fall while carrying heavy object into truck causing head trauma and multicompartmental hemorrhage with small foci of subarachnoid hemorrhage in the bilateral inferior frontal region and additional small foci of subarachnoid hemorrhage as well as R significant temporal bone fracture and small scalp hematoma in right occipito-temporal region.    - Residual impairments: Imbalance, incoordination, cog deficits (assess for other impairments during ARC course)   - Cleared for heparin 5000 U SQ TID for VTE prophylaxis   - Repeat Head CT Fri 9/29 and follow-up with NSx after  - Keppra seizure prophylaxis thru 9/21     - Current/recent mood/affect/behavior/cog - somewhat flattened/euthymic  - Remains at risk for increased confusion/delirium, restlessness, agitation, and fall - continue to monitor for concerns/changes  - Current psychotropics and potentially sedating medications:   • Citalopram  • Keppra stopping 9/21  • Melatonin 3mg HS  • PRN Oxy 2.5mg   - Monitor for need for 1:1 (Notify MD of potentially dangerous behavior such as significant impulsivity and trying to stand/get out of bed/chair unattended that could lead to fall/injury); High fall precautions with frequent rounding, patient close to nursing station if possible, frequent toileting/frequent offering urinal/bedpan (only if too immobile for safe toileting);  bed/chair alarm on at all times (high setting if needed); fall lj on side of bed (at discretion of nursing/therapy); do not leave unattended in bathroom, patient education; call bell availability; Sleep/agitation log, frequent redirection, reorientation, reassurance;  Family access to patient if helpful  - No recent reports of dangerous/risky behavior requiring 1:1 at this point but monitor closely  - Recommend acute comprehensive interdisciplinary inpatient rehabilitation to include intensive skilled therapies (PT, OT, ST) with oversight and management by rehabilitation physician. Inpatient rehab level nursing, case management and weekly interdisciplinary team meetings. Provide patient and (if available) caregiver/family teaching regarding brain injury/residual disability management. - For routine restlessness, anxiety, irritability focus on non-pharmacologic management    - Obtain MOCA when cog status stabilized   - Please assess iADLs - ability to manage medications, meal prep, finances, obtaining appropriate transport from community, managing emergencies, and overall safety in home environment. - Provide therapy, compensatory strategies, and if available and necessary provide caregiver training. Notify MD of impairments or inability to perform iADLs adequately. - Monitor neuro-exam, wakefulness, mood, cognition, insight into deficits and safety awareness   - Monitor and ensure optimal management electrolytes, nutrition, and hydration  - Monitor for signs or symptoms of infection, medication intolerances, other systemic etiologies  - Additional labs, imaging, specialist follow-up as needed   - Patient/family/caregiver education and training   - Overstimulation precautions, frequent re-orientation, re-direction, re-assurance  - Optimal mood, pain, and sleep management  - Sleep log and agitation monitoring    - Limit sedating medications when possible  - Follow-up with NeuroSx after discharge and if needed during ARC course as well as PCP      Fracture of temporal bone ([address])  Assessment & Plan  - Acute comminuted otic sparing right temporal bone fracture involving the squamosal, mastoid, and tympanic portions with diastases of occipitotemporal suture and extension of fracture into visualized right occipital calvarium. Recommend follow-up CTV   head with contrast to assess underlying patency of right dural venous sinuses.   - Probable small-to-moderate RIGHT-sided bloody mastoid effusion, large bloody middle ear effusion, and probable debris and blood products throughout external auditory canal.  - Small scalp hematoma in right occipito-temporal region.     LEFT TEMPORAL BONE CT  -Trace left mastoid effusion. Otherwise, normal LEFT temporal bone CT.     Per ENT  "right otic sparing temporal bone fracture   -Facial nerve intact, no need for surgical decompression  -Hearing loss in right ear- tuning fork exam consistent with conductive pattern secondary to debris in canal and middle ear  -Recommend outpatient follow-up with audiogram next week or the following week after discharge  -Floxin drops twice daily 5 drops to R ear for 10 days due to debris in canal  -Please do not hesitate to reach out with any questions or concerns"  - OK to use Floxin drops in ear - will need full 10 day course starting now (was getting in eye)  - Unasyn for 1 week course per discharging providers who I spoke with on day of admission   - Did not undergo CTV/CTA due to concern for kidney function         History of bladder cancer  Assessment & Plan  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  - Monitor site and for s/s of infection  - Receiving Avelunab every 14 days and Aranesp - on hold with acute medical decline   - Imaging of C/A/P concerning for mets - per discharging provider "soft tissue mass, hepatic lesions, peritoneal stippling consistent with metastatic bladder cancer new from last CAT scan in August 2023"  - Follow-up with H-O    Abnormal findings on imaging test  Assessment & Plan  CTA chest/abd/pelvis on 9/15 with multiple concerning lymph nodes/lesions for metastasis including: nodule at the L obturator internus muscle, L sided mesenteric lymph nodes, R external iliac lymph node, R sided retroperitoneal lymph nodes, hypodense structure along with R pelvic sidewall, lesions in the R inferior hepatic lobe, peritoneal soft tissue nodularity, R cardiophrenic lymph node, 2 enlarged retrocrural lymph nodes, and R middle lobe nodule. Follow-up with Hematology/Oncology (Can reach out to his OP H-O provider during ARC course)     At risk for venous thromboembolism (VTE)  Assessment & Plan  SCDs, ambulation, and heparin       Insomnia  Assessment & Plan  Melatonin     GERD (gastroesophageal reflux disease)  Assessment & Plan  PPI    Atrial flutter (HCC)  Assessment & Plan  IM consulted and with overall management at their discretion during ARC course  Rate control: MTP 12.5mg Q12H   Antithrombotic: None - consideration for them once cleared by NSx       History of stroke  Assessment & Plan  Per IM  "[address] on 9/16 showed chronic appearing R caudate lacunar infarct. Currently on Lipitor 40mg daily. Would benefit from starting antiplatelet - would need to discuss with Neurosurgery after follow-up in 2 weeks.   Continue with PT/OT."    Anemia due to stage 4 chronic kidney disease ([address])  Assessment & Plan  Consult(ed) IM and comanagement with their service during ARC course   Monitor H/H, vitals, signs/symptoms of acute bleeding      Stage 4 chronic kidney disease [facility])  Assessment & Plan  Lab Results   Component Value Date    EGFR 29 09/20/2023    EGFR 25 09/19/2023    EGFR 26 09/18/2023    CREATININE 2.29 (H) 09/19/2023    CREATININE 2.23 (H) 09/18/2023   Monitor BUN/Cr intermittently as well as electrolytes   IM consulted to manage  Limit nephrotoxic agents when possible  Optimal BP mgmt       Benign hypertension with chronic kidney disease, stage IV [facility])  Assessment & Plan  Lab Results   Component Value Date    EGFR 29 09/20/2023    EGFR 25 09/19/2023    EGFR 26 09/18/2023    CREATININE 2.04 (H) 09/20/2023    CREATININE 2.29 (H) 09/19/2023    CREATININE 2.23 (H) 09/18/2023     Internal medicine consulted and co-management with their service  Monitor vitals with and without activity; monitor for orthostasis  Monitor hemoglobin, electrolytes, kidney function, hydration status Current meds: Amlodipine, MTP      Anxiety and depression  Assessment & Plan  Celexa  Supportive counseling  Psychology consult while in [address] on and continue to encourage deep breathing/relaxation/behavioral management techniques      Ischemic cardiomyopathy  Assessment & Plan  IM consulted and with overall management at their discretion during ARC course ([id number]%)  OP Cards     Coronary artery disease involving native coronary artery of native heart without angina pectoris  Assessment & Plan  IM consulted and with overall management at their discretion during ARC course  Antithrombotic: Currently none (was not on in OP setting) - consider after clearance with NSx   Statin  Optimal blood pressure and blood sugar control      Hyperlipidemia  Assessment & Plan  Statin     DMII (diabetes mellitus, type 2) ([facility])  Assessment & Plan  Lab Results   Component Value Date    HGBA1C 6.3 06/05/2023       Recent Labs     09/19/23  1156 09/19/23  1637 09/20/23  0637 09/20/23  1142   POCGLU 117 138 137 167*       Blood Sugar Average: Last 72 hrs:  (P) 152     Internal medicine consulted and management at their discretion  Monitor for signs and symptoms of hypoglycemia   Current meds: Lispro SS AC/HS, consistent carb diabetic diet        Disposition:   Home with estimate length of stay of 2 weeks from admission    Follow-up:   PCP  Neurosurgery  ENT  Oncology/urology  Cardiology  Chronic providers    CODE: Level 1: Full Code    Restrictions include:   Fall precautions, see above    ---------------------------------------------------------------------------------------------------------------------      OBJECTIVE:    Physical Exam:  Temp: 97.9 °F (36.6 °C)-99.6 °F (37.6 °C) 99.6 °F (37.6 °C)  HR: 63-70 70  Resp: 14-18 18  BP: (120-192)/() 162/60  Vitals above reviewed on date of encounter    General: Awake, alert in NAD  HENT:  MMM, some facial ecchymosis and mild swelling  Neck: Supple, no lymphadenopathy  Respiratory: Unlabored breathing, breath sounds equal, Lungs CTA, no wheezes, rales, or rhonchi  Cardiovascular: Regular rate and rhythm, no murmurs, rubs, or gallops  Gastrointestinal: Soft, non-tender, non-distended, normoactive bowel sounds  Genitourinary: Ileal conduit with catheter in place  SkiN/MSK/Extremities:  Distal extremities appropriately warm, normal cap refill distally, no cyanosis or calf edema, no calf tenderness to palpation  Neurologic/Psych:   MENTAL STATUS: awake, oriented to person, place, time, and partially to situation  Affect: Flattened somewhat  CN II-XII: grossly intact   Strength/MMT: Near full throughout    Laboratory:    Results from last 7 days   Lab Units 09/15/23  1306   HEMOGLOBIN g/dL 10.3*   HEMATOCRIT % 31.4*   WBC Thousand/uL 15.04*     Results from last 7 days   Lab Units 09/19/23  0552 09/18/23  0525 09/17/23  0446 09/15/23  1306   BUN mg/dL 36* 40* 44* 57*   POTASSIUM mmol/L 3.7 3.9 3.9 3.8   CHLORIDE mmol/L 108 106 107 103   CREATININE mg/dL 2.29* 2.23* 2.45* 2.28*   AST U/L  --   --   --  17   ALT U/L  --   --   --  15     Results from last 7 days   Lab Units 09/15/23  2128   PROTIME seconds 14.4   INR  1.10        Wt Readings from Last 1 Encounters:   09/19/23 83.9 kg (184 lb 15.5 oz)     Estimated body mass index is 28.97 kg/m² as calculated from the following:    Height as of this encounter: 5' 7" (1.702 m). Weight as of this encounter: 83.9 kg (184 lb 15.5 oz).     Imaging: reviewed    Per EMR:  Past Medical History:   Past Surgical History:   Family History:   Social history:   Past Medical History:   Diagnosis Date   • Acute kidney injury ([address])    • Anemia Jan. 2022   • Arthritis     Hands   • Wright esophagus    • BPH with obstruction/lower urinary tract symptoms    • CAD (coronary artery disease)     Last assessed 09/16/15   • Cancer [facility])     bladder   • Cataract, acquired     Last assessed 10/10/17   • Chronic kidney disease    • Coronary artery disease    • Diabetes mellitus ([address])     NIDDM   • Diabetic neuropathy ([address])     Feet   • Enlarged prostate with lower urinary tract symptoms (LUTS)     Last assessed 10/10/17   • Erectile dysfunction    • GERD (gastroesophageal reflux disease)     Last assessed 10/10/17   • Hemoptysis     Last assessed 03/14/16   • Hypercholesterolemia     Last assessed 10/10/17   • Hypertension     Last assessed 10/10/17   • Kidney stone    • Macular degeneration     Right eye is particularly affected-peripheral vision intact   • Myocardial infarction [facility]) 1998   • OAB (overactive bladder)    • Testicular hypofunction    • Testicular hypogonadism     Last assessed 10/10/17   • Tinnitus    • Umbilical hernia     Last assessed 06/18/14   • Urge incontinence of urine    • Wears glasses     Past Surgical History:   Procedure Laterality Date   • BLADDER SURGERY     • COLONOSCOPY     • CORONARY ARTERY BYPASS GRAFT  07/16/2014    ABG x 4 LIMA to LAD,SVG to diagnoal 2 SVG to OM-1, SVG to PDA, resection of partial plerual mass   • CT GUIDED PERC DRAINAGE CATHETER PLACEMENT  02/19/2021   • CYSTOSCOPY  2013   • CYSTOSCOPY  02/08/2022    Olya   • ESOPHAGOGASTRODUODENOSCOPY     • FL RETROGRADE PYELOGRAM  12/20/2018   • FL RETROGRADE PYELOGRAM  12/05/2019   • INGUINAL HERNIA REPAIR Bilateral 2015   • IR DRAINAGE TUBE CHECK AND/OR REMOVAL  03/05/2021   • PHOTODYNAMIC THERAPY      For Barretts esophagus   • NH COLONOSCOPY FLX DX W/COLLJ SPEC WHEN PFRMD N/A 04/25/2016    Procedure: COLONOSCOPY;  Surgeon: Becka Page MD;  Location: [facility];   Service: Gastroenterology   • NH CYSTO W/REMOVAL OF LESIONS SMALL N/A 12/05/2019    Procedure: CYSTO W/TURBT AND TRANSURETHRAL PROSTATE BIOPSY AND OPENING OF BLADDER NECK CONTRACTURE, B/L Retrograde pyelogram;  Surgeon: Kirstin Frazier MD;  Location: [facility];  Service: Urology   • NH CYSTOURETHROSCOPY W/DEST &/RMVL MED BLADDER TY N/A 04/16/2020    Procedure: Reinaldo Curry RESECTION OF BLADDER TUMOR (TURBT);   Surgeon: Jonelle Tapia MD;  Location: [facility];  Service: Urology   • [place] CYSTOURETHROSCOPY WITH BIOPSY N/A 09/10/2020    Procedure: CYSTOSCOPY, COLLECTION OF LEFT KIDNEY CYTOLOGY, BILATERAL RETROGRADE PYELOGRAM, BLADDER WALL BIOPSIES  AND FULGERAION, RANDOM BLADDER TUMOR BIOPSIES;  Surgeon: Jonelle Tapia MD;  Location: [address] [place] [address] MAIN OR;  Service: Urology   • [place] CYSTOURETHROSCOPY WITH BIOPSY N/A 2022    Procedure: urethroscopy , biopsy of urethra with fulgeration;  Surgeon: Travis Baxter MD;  Location:  MAIN OR;  Service: Urology   • PROSTATE SURGERY     • TONSILLECTOMY     • TRANSURETHRAL RESECTION OF PROSTATE N/A 2018    Procedure: CYSTO, PHOTO SELECTIVE VAPORIZATION OF PROSTATE, B/L RETROGRADE PYELOGRAM, TURBT;  Surgeon: Jonelle Tapia MD;  Location: [facility];  Service: Urology   • UMBILICAL HERNIA REPAIR     • UPPER GASTROINTESTINAL ENDOSCOPY       Family History   Problem Relation Age of Onset   • Cancer Mother         Topical oral abrasian caused cancer   • Other Mother         Digestive System Complications   • Diabetes Father    • Heart disease Father    • Hypertension Father    • Coronary artery disease Father    • Hyperlipidemia Father       Social History     Socioeconomic History   • Marital status: /Civil Union     Spouse name: Not on file   • Number of children: Not on file   • Years of education: Not on file   • Highest education level: Not on file   Occupational History   • Occupation: Sales position   Tobacco Use   • Smoking status: Former     Packs/day: 1.00     Years: 10.00     Total pack years: 10.00     Types: Cigarettes     Quit date: 1972     Years since quittin.7   • Smokeless tobacco: Never   • Tobacco comments:     Quit at age 28   Vaping Use   • Vaping Use: Never used   Substance and Sexual Activity   • Alcohol use: Not Currently     Alcohol/week: 2.0 standard drinks of alcohol     Types: 1 Glasses of wine, 1 Cans of beer per week     Comment: Non since 7/15/2020   • Drug use: Never   • Sexual activity: Not Currently     Partners: Female   Other Topics Concern   • Not on file   Social History Narrative    Always uses seatbelt        Caffeine use- Drinks 2 cups of coffee daily     Social Determinants of Health     Financial Resource Strain: Low Risk  (11/29/2022)    Overall Financial Resource Strain (CARDIA)    • Difficulty of Paying Living Expenses: Not very hard   Food Insecurity: No Food Insecurity (9/17/2023)    Hunger Vital Sign    • Worried About Running Out of Food in the Last Year: Never true    • Ran Out of Food in the Last Year: Never true   Transportation Needs: No Transportation Needs (9/17/2023)    PRAPARE - Transportation    • Lack of Transportation (Medical): No    • Lack of Transportation (Non-Medical):  No   Physical Activity: Not on file   Stress: Not on file   Social Connections: Not on file   Intimate Partner Violence: Not on file   Housing Stability: Low Risk  (9/17/2023)    Housing Stability Vital Sign    • Unable to Pay for Housing in the Last Year: No    • Number of Places Lived in the Last Year: 1    • Unstable Housing in the Last Year: No          Current Medical Diagnosis Medications Allergies   Patient Active Problem List   Diagnosis   • Arteriosclerotic cardiovascular disease   • Backache   • DMII (diabetes mellitus, type 2) ([address])   • Hyperlipidemia   • Wright's esophagus with esophagitis   • Overactive bladder   • Bladder tumor   • 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)   • Hx of CABG   • Malignant neoplasm of overlapping sites of bladder ([address])   • Closed fracture of upper end of right fibula   • History of bladder cancer   • Coronary artery disease involving native coronary artery of native heart without angina pectoris   • Ischemic cardiomyopathy   • Positive urinary cytology   • Malignant neoplasm of urinary bladder neck ([address])   • Liver function study, abnormal   • Elevated troponin   • Forearm mass, left   • Fungal dermatitis   • Anxiety and depression   • Overweight   • Ambulatory dysfunction   • Frequent falls   • Right sided Pelvic abscess in male [facility])   • Severe protein-calorie malnutrition (HCC)   • Metabolic acidosis   • Hypomagnesemia   • RBBB   • Benign hypertension with chronic kidney disease, stage IV (HCC)   • Persistent proteinuria   • Anemia   • Chronic kidney disease-mineral and bone disorder   • Visual hallucinations   • Functional diarrhea   • Platelets decreased (HCC)   • Urethral tumor   • Secondary hyperparathyroidism of renal origin ([address])   • Stage 4 chronic kidney disease (HCC)   • Anemia due to stage 4 chronic kidney disease (HCC)   • Shortness of breath   • Chemotherapy-induced thrombocytopenia   • Rib pain on left side   • Left inguinal pain   • Chronic diastolic CHF (congestive heart failure) (HCC)   • Pelvic lymphadenopathy   • Hyponatremia   • Fracture of temporal bone (HCC)   • SDH (subdural hematoma) (HCC)   • Fall   • SAH (subarachnoid hemorrhage) (HCC)   • Pneumocephalus   • History of stroke   • Abnormal findings on imaging test   • Atrial flutter (HCC)   • Leukocytosis   • GERD (gastroesophageal reflux disease)   • Sinus congestion   • Insomnia   • At risk for venous thromboembolism (VTE)      Current Facility-Administered Medications:   •  acetaminophen (TYLENOL) tablet 650 mg, 650 mg, Oral, Q6H PRN, LUKE Gray, 650 mg at 09/19/23 1652  •  amLODIPine (NORVASC) tablet 5 mg, 5 mg, Oral, BID, LUKE Gray, 5 mg at 09/19/23 2129  •  ampicillin-sulbactam (UNASYN) 3 g in sodium chloride 0.9 % 100 mL IVPB, 3 g, Intravenous, Q12H, Nithya Pratt MD  •  atorvastatin (LIPITOR) tablet 40 mg, 40 mg, Oral, HS, Nithya Pratt MD, 40 mg at 09/19/23 2129  •  bisacodyl (DULCOLAX) rectal suppository 10 mg, 10 mg, Rectal, Daily PRN, Nithya Pratt MD  •  calcium carbonate (TUMS) chewable tablet 500 mg, 500 mg, Oral, Daily PRN, Malone March, CRNP, 500 mg at 09/19/23 1735  • START ON 9/20/2023 citalopram (CeleXA) tablet 20 mg, 20 mg, Oral, Daily, Jerrica Salcido MD  • START ON 9/20/2023 heparin (porcine) subcutaneous injection 5,000 Units, 5,000 Units, Subcutaneous, Q8H [address], Jerrica Salcido MD  •  hydrALAZINE (APRESOLINE) tablet 10 mg, 10 mg, Oral, Q8H PRN, Malone March, CRNP  •  insulin lispro (HumaLOG) 100 units/mL subcutaneous injection 1-6 Units, 1-6 Units, Subcutaneous, TID AC **AND** Fingerstick Glucose (POCT), , , TID AC, Jerrica Salcido MD  •  levETIRAcetam (KEPPRA) tablet 500 mg, 500 mg, Oral, Q12H [address], Malone March, CRNP, 500 mg at 09/19/23 2129  •  metoprolol tartrate (LOPRESSOR) tablet 12.5 mg, 12.5 mg, Oral, Q12H [address], Malone March, CRNP, 12.5 mg at 09/19/23 2127  •  ofloxacin (OCUFLOX) 0.3 % ophthalmic solution 5 drop, 5 drop, Otic, Daily, Jerrica Salcido MD, 5 drop at 09/19/23 1654  •  ondansetron (ZOFRAN) injection 4 mg, 4 mg, Intravenous, Q6H PRN, Jerrica Salcido MD  •  ondansetron (ZOFRAN-ODT) dispersible tablet 4 mg, 4 mg, Oral, Q8H PRN, Jerrica Salcido MD  •  oxyCODONE (ROXICODONE) split tablet 2.5 mg, 2.5 mg, Oral, Q8H PRN, Jerrica Salcido MD  • START ON 9/20/2023 pantoprazole (PROTONIX) EC tablet 40 mg, 40 mg, Oral, Early Morning, Jerrica Salcido MD  •  polyethylene glycol (MIRALAX) packet 17 g, 17 g, Oral, Daily PRN, Jerrica Salcido MD  •  polyethylene glycol (MIRALAX) packet 17 g, 17 g, Oral, Daily PRN, Jerrica Salcido MD, 17 g at 09/19/23 2135  •  senna-docusate sodium (SENOKOT S) 8.6-50 mg per tablet 1 tablet, 1 tablet, Oral, HS, Jerrica Salcido MD, 1 tablet at 09/19/23 2129  •  sodium bicarbonate tablet 1,300 mg, 1,300 mg, Oral, BID after meals, Jerrica Salcido MD, 1,300 mg at 09/19/23 1654  • START ON 9/21/2023 torsemide (DEMADEX) tablet 20 mg, 20 mg, Oral, Every Other Day, Jerrica Salcido MD Allergies Allergen Reactions   • Fentanyl Hallucinations   • Morphine And Related Other (See Comments)     While having an MI patient received morphine and had adverse reaction but doesn't know what happened. Prior Level of Function and social history:    He lives in [facility] single family home  Domingo Zurita is  and lives with their spouse. Self Care: ambulated with a SPC at baseline, Indoor Mobility: needed some assistance with ADLs at times, Stairs (in/outdoor): Independent and Cognition: Independent      FALLS IN THE LAST 6 MONTHS: 1-4     HOME ENVIRONMENT:  The living area: Patient lives in a one story home  There are 2 steps to enter the home. The patient will have 24 hour supervision/physical assistance available upon discharge    Current Level of Function:    Mobility:  Transfers mod assist   Ambulation/Mobility 15 ft min assist     ADLs:  Mod assist lower body bathing, lower body dressing, toileting  Min assist upper body bathing upper body dressing    Potential Barriers to Discharge:  Co-morbidities (see above), new functional deficits, impaired balance and coordination, possible cognitive deficits    Tolerance for three hours of therapy per therapy day: adequate     Rehabilitation Prognosis: good       Rehabilitation Plan/Therapy Interventions: This patient will have close medical and rehabilitation oversight from a physical medicine and rehabilitation physician and if needed an internal medicine physician to manage the complexity of medical issues to optimize health, ability to participate in therapy, quality of life, and functional outcomes. This patient requires 24 hour rehabilitation nursing to address bowel and bladder management, (pain management if listed below), medication administration, positioning/skin monitoring, fall/injury prevention, and VTE prophylaxis.       Physical, occupational and speech therapy: Patient requires PT and OT to improve functional mobility, transfers, upper and lower body strengthening, conditioning, balance, and gait training with appropriate assistive device. Patient also requires ST to evaluate and if appropriate treat deficits in speech, swallowing, and cognition. PT, OT, ST will be provided approximately 5 times per week for 60 minutes per day. Skilled therapists and nursing will also provide patient/family safety education and training. / will work to ensure proper communication between patient (+/- family) and staff regarding the overall rehabilitation and medical process while patient is in the acute rehabilitation center. / will work with patient (and if applicable family and community resources) to optimize safe discharge. Discharge Planning:    Estimated length of stay:   15-16 days. Family/Patient Goals:  Patient/family's goals: Return to previous home/apartment. Mobility Goals:   Largely supervision    Activities of Daily Living (ADLs) Goals:  Largely supervision    Cognition / Communication:  Closer to prior baseline    Rehabilitation and discharge goals discussed with the patient and/or family. Case Managment and Social Work to review patient/family resources and to coordinate Discharge Planning. Patient and Family Education and Training:  Rehabilitation and discharge goals discussed with the patient and/or family. Patient/family education/training needs to be discussed in weekly team meeting. Other equipment: To be determined    Medical Necessity Criteria for [facility] Admission:  The preadmission screen, post-admission physical evaluation, overall plan of care and admissions order demonstrate a reasonable expectation that the following criteria were met at the time of admission to the [facility]. (See "Specific areas of management and oversight in ARC setting" for additional details on medical necessity as outlined below).   1. The patient requires active and ongoing therapeutic intervention of multiple therapy disciplines (physical therapy, occupational therapy, speech-language pathology, or prosthetics/orthotics), one of which is physical or occupational therapy. 2. Patient requires an intensive rehabilitation therapy program, as defined in Chapter 1, section 110.2.2 of the CMS Medicare Policy Manual. This intensive rehabilitation therapy program will consist of at least 3 hours of therapy per day at least 5 days per week or at least 15 hours of intensive rehabilitation therapy within a 7 consecutive day period, beginning with the date of admission to the [facility]. 3. The patient is reasonably expected to actively participate in, and benefit significantly from, the intensive rehabilitation therapy program as defined in Chapter 1, section 110.2.2 of the CMS Medicare Policy Manual at this time of admission to the [facility]. He can reasonably be expected to make measurable improvement (that will be of practical value to improve the patient’s functional capacity or adaptation to impairments) as a result of the rehabilitation treatment, as defined in section 110.3, and such improvement can be expected to be made within the prescribed period of time. As noted in the CMS Medicare Policy Manual, the patient need not be expected to achieve complete independence in the domain of self-care nor be expected to return to his or her prior level of functioning in order to meet this standard. 4. The patient must require physician supervision by a rehabilitation physician. As such, a rehabilitation physician will conduct face-to-face visits with the patient at least 3 days per week throughout the patient’s stay in the [facility] to assess the patient both medically and functionally, as well as to modify the course of treatment as needed to maximize the patient’s capacity to benefit from the rehabilitation process.   5. The patient requires an intensive and coordinated interdisciplinary approach to providing rehabilitation, as defined in Chapter 1, section 110.2.5 of the CMS Medicare Policy Manual. This will be achieved through periodic team conferences, conducted at least once in a 7-day period, and comprising of an interdisciplinary team of medical professionals consisting of: a rehabilitation physician, registered nurse,  and/or , and a licensed/certified therapist from each therapy discipline involved in treating the patient. Changes Since Pre-admission Assessment: None -This patient's participation in rehab continues to be reasonable, necessary and appropriate. CMS Required Post-Admission Physician Evaluation Elements  History and Physical, including medical history, functional history and active comorbidities as in above text. Post-Admission Physician Evaluation:  The patient has the potential to make improvement and is in need of physical, occupational, and/or therapy services. The patient may also need nutritional services. Given the patient's complex medical condition and risk of further medical complications, rehabilitative services cannot be safely provided at a lower level of care, such as a skilled nursing facility. I have reviewed the patient's functional and medical status at the time of the preadmission screening and they are the same as on the day of this admission. I acknowledge that I have personally performed a full physical examination on this patient within 24 hours of admission. The patient demonstrated understanding the rehabilitation program and the discharge process after we discussed them.      Agree in entirety: yes  Minor adaptions: none    Major changes: none    Specific areas of management and oversight in ARC setting:     Cardiopulmonary function/Anemia: Ensure cardiopulmonary stability and optimize cardiopulmonary function not only at rest but with activity as patient's activity level significantly increases in acute rehab compared with prior to transfer in preparation for safe discharge from [facility]. Must closely and frequently monitor blood pressure, HR, anemia to ensure adequate cardiac output during ADLs and ambulation as patient is at increased risk for orthostatic hypotension/syncope and potential injury if not monitored for and managed adequately. Blood pressure management:    Frequent monitoring of blood pressure with appropriate adjustments in blood pressure medication management to optimize blood pressure control and prevent/limit renal complications. Monitoring impact of blood pressure and side-effects of blood pressure medications at rest and with activity. Pain management:  Pain will improve with frequent evaluation of pain, careful adjustments in medications, frequent re-evaluation of patient's pain and medical/neurologic status to ensure optimal pain control, avoidance of potential serious and even life-threatening side-effects and drug interactions, as well as weaning pain medications as soon as possible to decrease risk of short and long-term use. Brain injury:  Patient's cognitive status is significantly impaired and neurologic status can fluctuate particularly early in rehabilitation process. Patient requires frequent rehab physician and rehab nursing neurologic monitoring for subtle and more significant changes in mentation and neurologic function. Labs must be monitored closely along with hydration and nutritional status. Low threshold to obtain brain imaging. Medications must be carefully monitored and adjusted to optimize functional recovery while limit cognitive impairments. Goal to improve cognitive and neurologic function, provide appropriate patient (and/or family education), and to ensure appropriate optimal discharge plan.     Neurologic Disorder: TBI causing impaired mobility, ADLs, and gait:  intensive skilled therapies with physical therapy and occupational therapy with close oversight and management by rehab specialized physician in acute rehabilitation setting to most expeditiously and effectively improve functional mobility, transfers, upper and lower body strengthening, conditioning, balance, and gait training with appropriate assistive device. Patient will have optimal supervision and management of patient's underlying neurologic disorder with specialized rehabilitation physician during this period of recovery to ensure most expeditious and optimal recovery with decreased risks of fall/injury and other complications including acute worsening of neuro disorder, decrease risk of VTE, PNA, and skin ulceration. Inpatient rehabilitation education/teaching: To be provided to patient and typically family/caregiver (if able to be identified) by all skilled therapists, rehab nursing, case management, and rehab specialized physician to ensure optimal recovery and decrease risks of complications in both acute rehabilitation setting as well as after discharge. Chronic kidney failure management and blood pressure management: Frequent measurements and evaluation of urinary intake, urinary output, and labs which include BUN/Cr and electrolytes with appropriate adjustments in medication and when necessary order additional testing. Frequent monitoring of blood pressure with appropriate adjustments in blood pressure medication management to optimize blood pressure control and prevent/limit renal complications. Cath management: Appropriate urinary retention management   Skin management: Increased risk of skin wounds/breakdown/rashes due to recent immobility and co-morbidities. Appropriate skin checks from nursing and as needed physician. Frequent turning, application of appropriate barrier creams/dressings, cushions. Patient/caregiver education. Optimal bowel/bladder hygiene and mobilization. ** Please Note: Fluency Direct voice to text software may have been used in the creation of this document.  **    85 minutes or greater spent for this encounter which included a combination of face-to-face time with patient and non-face-to-face time which in part specifically includes management of TBI. Face-to-face time included extended discussion with patient regarding current condition, medical history, mood, medical/rehabilitation management, and disposition. Non face-to-face time included coordination of care with patient's co-managing AP and/or physician(s) thru communication and review of their recent documentation as well as reviewing vitals, bowel/bladder function, recent labs, diagnostic imaging, and notes from therapy, CM, and nursing. I personally performed the required components and examined the patient myself in person on 9/19/23.       Jerrica Salcido MD, [facility]  Physical Medicine and Rehabilitation  Brain Injury Medicine

## 2023-09-20 NOTE — ASSESSMENT & PLAN NOTE
S/p fall on 9/15. CTH showed hematoma in the L parietal vertex and L occipital region, non-displaced fracture through the R temporal bone, linear non-displaced fracture through the posterior wall of the condyle with middle ear hemorrhage, fracture passes through the hypotympanum, non-displaced fracture through the R occipital bone. ENT consulted in acute setting - follow-up 1 week after discharge for audiogram.  No surgical intervention needed at this time. Continue Unasyn IV for 7 days total for open fracture (complete on 9/22). Continue Floxin drops to R ear for 10 days total (complete on 9/25). Consider CTA/CTV as needed if concerns for sinus thrombosis given pneumocephalus within the sinus near the area of the fracture. Had not been done inpatient due to concerns of frequent contrast administration with CKD stage 4.

## 2023-09-20 NOTE — PROGRESS NOTES
NEUROPSYCHOLOGY INITIAL CONSULT  NOTE  Brigette Quevedo 80 y.o. XGY:[birth year] male MRN: [id number]  DOS:09/20/23   Unit/Bed#: -01 Encounter: [id number]      Requested by (Physician/Service): John Montes MD  Reason for Consultation: Evaluate and treat impact of mood and coping on progress in rehabilitation. HPI: Brigette Quevedo is a 80 y.o. male with a PMH of CAD s/p CABG, ischemic cardiomyopathy, HTN, T2DM, CKD stage IV, and bladder cancer who originally presented to [address] [place] [address] on 9/15/2023 after experiencing a fall backwards onto his head after trying to put items into the back of his truck. CTH showed hematoma in the L parietal vertex and L occipital region, non-displaced fracture through the R temporal bone, linear non-displaced fracture through the posterior wall of the condyle with middle ear hemorrhage, fracture passes through the hypotympanum, non-displaced fracture through the R occipital bone, multicompartmental hemorrhage with small foci of subarachnoid hemorrhage in the bilateral inferior frontal region, small foci of subdural hemorrhage in the R insular region and temporal region without significant mass effect, additional small foci of subarachnoid hemorrhage vs small contusions in the R temporal region. ENT was consulted and recommended OP follow-up with audiogram within 1 week of discharge. Felt that hearing loss in the R ear consistent with conductive pattern secondary to debris in the canal and middle ear. Started on Floxin drops to the R ear for 10 days and Unasyn due to the open fracture for 7 days. Neurosurgery was consulted and patient did not require surgical intervention. Patient was started on Keppra for 7 days for seizure prophylaxis and AC/AP therapy is to be held. Patient was restarted on DVT ppx. Will require follow-up with Neurosurgery in 2 weeks with repeat CTH and is to maintain SBP <160. Repeat CTH on 9/17 was stable.   Cardiology was consulted due to new onset a-flutter along with elevated troponins. ECHO was obtained and showed EF 50-50% with abnormal diastolic inflow. Troponin was felt to be elevated due to type II MI in the setting of trauma. Will require follow-up with Cardiology as outpatient. Nephrology was consulted due to history of CKD stage 4 and ENT considering obtaining CTA/CTV to rule out sinus thrombosis due to pneumocephalus within the sinus near the area of the fracture. Nephrology recommended holding off on CTA/CTV due to recent CTA chest/abd/pelvis. Losartan was placed on hold at that time. Recommending if CTA/CTV is required to give patient fluids pre and post imaging. Patient was evaluated by PT/OT and was recommended for acute rehab. He was admitted to [address] [place] [address] on 9/19/2023.         HISTORY:     Patient Active Problem List    Diagnosis Date Noted   • Sinus congestion 09/20/2023   • Insomnia 09/20/2023   • At risk for venous thromboembolism (VTE) 09/20/2023   • History of stroke 09/19/2023   • Abnormal findings on imaging test 09/19/2023   • Atrial flutter ([address]) 09/19/2023   • Leukocytosis 09/19/2023   • GERD (gastroesophageal reflux disease) 09/19/2023   • Fracture of temporal bone ([address]) 09/15/2023   • SDH (subdural hematoma) ([address]) 09/15/2023   • Fall 09/15/2023   • SAH (subarachnoid hemorrhage) ([address]) 09/15/2023   • Pneumocephalus 09/15/2023   • Hyponatremia 09/05/2023   • Pelvic lymphadenopathy 05/16/2023   • Chronic diastolic CHF (congestive heart failure) ([address]) 05/04/2023   • Rib pain on left side 04/11/2023   • Left inguinal pain 04/11/2023   • Chemotherapy-induced thrombocytopenia 02/15/2023   • Shortness of breath 12/14/2022   • Anemia due to stage 4 chronic kidney disease ([address]) 12/07/2022   • Stage 4 chronic kidney disease ([address]) 08/10/2022   • Secondary hyperparathyroidism of renal origin ([address]) 05/19/2022   • 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   • Functional diarrhea 03/25/2022   • Platelets decreased ([address]) 03/25/2022   • Visual hallucinations 11/23/2021   • Chronic kidney disease-mineral and bone disorder 10/24/2021   • Benign hypertension with chronic kidney disease, stage IV ([address]) 07/19/2021   • Persistent proteinuria 07/19/2021   • Anemia 07/19/2021   • RBBB 05/07/2021   • Hypomagnesemia 60/77/0863   • Metabolic acidosis 76/74/0156   • Severe protein-calorie malnutrition ([address]) 02/19/2021   • Right sided Pelvic abscess in male [facility]) 02/18/2021   • Ambulatory dysfunction 02/16/2021   • Frequent falls 02/16/2021   • Anxiety and depression 12/22/2020   • Overweight 12/22/2020   • Fungal dermatitis 12/03/2020   • Forearm mass, left 09/03/2020   • Elevated troponin 07/21/2020   • Liver function study, abnormal 06/25/2020   • Malignant neoplasm of urinary bladder neck ([address]) 03/24/2020   • Positive urinary cytology 11/05/2019   • Coronary artery disease involving native coronary artery of native heart without angina pectoris 09/09/2019   • Ischemic cardiomyopathy 09/09/2019   • History of bladder cancer 07/30/2019   • Closed fracture of upper end of right fibula 03/11/2019   • Malignant neoplasm of overlapping sites of bladder ([address]) 01/10/2019   • 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/2019   • Type 2 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 ([address]) 12/05/2018   • Bladder tumor 11/01/2018   • Overactive bladder 10/10/2018   • 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   • Backache 10/21/2013   • Arteriosclerotic cardiovascular disease 10/11/2012   • DMII (diabetes mellitus, type 2) ([address]) 10/11/2012   • Hyperlipidemia 10/11/2012       Body mass index is 28.97 kg/m².     Past Medical History:     Past Medical History:   Diagnosis Date   • Acute kidney injury ([address])    • Anemia Jan. 2022   • Arthritis     Hands   • Wright esophagus    • BPH with obstruction/lower urinary tract symptoms    • CAD (coronary artery disease)     Last assessed 09/16/15   • Cancer [facility])     bladder   • Cataract, acquired     Last assessed 10/10/17   • Chronic kidney disease    • Coronary artery disease    • Diabetes mellitus ([address])     NIDDM   • Diabetic neuropathy (HCC)     Feet   • Enlarged prostate with lower urinary tract symptoms (LUTS)     Last assessed 10/10/17   • Erectile dysfunction    • GERD (gastroesophageal reflux disease)     Last assessed 10/10/17   • Hemoptysis     Last assessed 03/14/16   • Hypercholesterolemia     Last assessed 10/10/17   • Hypertension     Last assessed 10/10/17   • Kidney stone    • Macular degeneration     Right eye is particularly affected-peripheral vision intact   • Myocardial infarction [facility]) 1998   • OAB (overactive bladder)    • Testicular hypofunction    • Testicular hypogonadism     Last assessed 10/10/17   • Tinnitus    • Umbilical hernia     Last assessed 06/18/14   • Urge incontinence of urine    • Wears glasses         Past Surgical History:     Past Surgical History:   Procedure Laterality Date   • BLADDER SURGERY     • COLONOSCOPY     • CORONARY ARTERY BYPASS GRAFT  07/16/2014    ABG x 4 LIMA to LAD,SVG to diagnoal 2 SVG to OM-1, SVG to PDA, resection of partial plerual mass   • CT GUIDED PERC DRAINAGE CATHETER PLACEMENT  02/19/2021   • CYSTOSCOPY  2013   • CYSTOSCOPY  02/08/2022    Olya   • ESOPHAGOGASTRODUODENOSCOPY     • FL RETROGRADE PYELOGRAM  12/20/2018   • FL RETROGRADE PYELOGRAM  12/05/2019   • INGUINAL HERNIA REPAIR Bilateral 2015   • IR DRAINAGE TUBE CHECK AND/OR REMOVAL  03/05/2021   • PHOTODYNAMIC THERAPY      For Barretts esophagus   • ND COLONOSCOPY FLX DX W/COLLJ SPEC WHEN PFRMD N/A 04/25/2016    Procedure: COLONOSCOPY;  Surgeon: Calli Thorpe MD;  Location: [facility];   Service: Gastroenterology   • ND CYSTO W/REMOVAL OF LESIONS SMALL N/A 12/05/2019    Procedure: CYSTO W/TURBT AND TRANSURETHRAL PROSTATE BIOPSY AND OPENING OF BLADDER NECK CONTRACTURE, B/L Retrograde pyelogram;  Surgeon: Radha Smith MD;  Location: [facility];  Service: Urology   • ND CYSTOURETHROSCOPY W/DEST &/RMVL MED BLADDER TY N/A 2020    Procedure: CYSTOSCOPY, TRANSURETHRAL RESECTION OF BLADDER TUMOR (TURBT); Surgeon: Uziel Sunshine MD;  [facility];  Service: Urology   • FL CYSTOURETHROSCOPY WITH BIOPSY N/A 09/10/2020    Procedure: CYSTOSCOPY, COLLECTION OF LEFT KIDNEY CYTOLOGY, BILATERAL RETROGRADE PYELOGRAM, BLADDER WALL BIOPSIES  AND FULGERAION, RANDOM BLADDER TUMOR BIOPSIES;  Surgeon: Uziel Sunshine MD;  Location: [address] [place] [address] MAIN OR;  Service: Urology   • FL CYSTOURETHROSCOPY WITH BIOPSY N/A 2022    Procedure: urethroscopy , biopsy of urethra with fulgeration;  Surgeon: Mariann Osorio MD;  Location:  MAIN OR;  Service: Urology   • PROSTATE SURGERY     • TONSILLECTOMY     • TRANSURETHRAL RESECTION OF PROSTATE N/A 2018    Procedure: CYSTO, PHOTO SELECTIVE VAPORIZATION OF PROSTATE, B/L RETROGRADE PYELOGRAM, TURBT;  Surgeon: Uziel Sunshine MD;  [facility];  Service: Urology   • UMBILICAL HERNIA REPAIR     • UPPER GASTROINTESTINAL ENDOSCOPY           Allergies: Allergies   Allergen Reactions   • Fentanyl Hallucinations   • Morphine And Related Other (See Comments)     While having an MI patient received morphine and had adverse reaction but doesn't know what happened.          Social History:    Social History     Socioeconomic History   • Marital status: /Civil Union     Spouse name: Not on file   • Number of children: Not on file   • Years of education: Not on file   • Highest education level: Not on file   Occupational History   • Occupation: Sales position   Tobacco Use   • Smoking status: Former     Packs/day: 1.00     Years: 10.00     Total pack years: 10.00     Types: Cigarettes     Quit date: 1972     Years since quittin.7   • Smokeless tobacco: Never   • Tobacco comments:     Quit at age 28   Vaping Use   • Vaping Use: Never used   Substance and Sexual Activity   • Alcohol use: Not Currently     Alcohol/week: 2.0 standard drinks of alcohol     Types: 1 Glasses of wine, 1 Cans of beer per week     Comment: Non since 7/15/2020   • Drug use: Never   • Sexual activity: Not Currently     Partners: Female   Other Topics Concern   • Not on file   Social History Narrative    Always uses seatbelt        Caffeine use- Drinks 2 cups of coffee daily     Social Determinants of Health     Financial Resource Strain: Low Risk  (11/29/2022)    Overall Financial Resource Strain (CARDIA)    • Difficulty of Paying Living Expenses: Not very hard   Food Insecurity: No Food Insecurity (9/17/2023)    Hunger Vital Sign    • Worried About Running Out of Food in the Last Year: Never true    • Ran Out of Food in the Last Year: Never true   Transportation Needs: No Transportation Needs (9/17/2023)    PRAPARE - Transportation    • Lack of Transportation (Medical): No    • Lack of Transportation (Non-Medical):  No   Physical Activity: Not on file   Stress: Not on file   Social Connections: Not on file   Intimate Partner Violence: Not on file   Housing Stability: Low Risk  (9/17/2023)    Housing Stability Vital Sign    • Unable to Pay for Housing in the Last Year: No    • Number of Places Lived in the Last Year: 1    • Unstable Housing in the Last Year: No        Family History:    Family History   Problem Relation Age of Onset   • Cancer Mother         Topical oral abrasian caused cancer   • Other Mother         Digestive System Complications   • Diabetes Father    • Heart disease Father    • Hypertension Father    • Coronary artery disease Father    • Hyperlipidemia Father        Medications:     Current Facility-Administered Medications:   •  acetaminophen (TYLENOL) tablet 650 mg, 650 mg, Oral, Q6H PRN, LUKE Hernandez, 650 mg at 09/20/23 6213  •  amLODIPine (NORVASC) tablet 5 mg, 5 mg, Oral, BID, LUKE Hernandez, 5 mg at 09/20/23 0815  •  ampicillin-sulbactam (UNASYN) 3 g in sodium chloride 0.9 % 100 mL IVPB, 3 g, Intravenous, Q12H, Alejandrina Psacual Chacha Garcia MD, [facility] at 09/20/23 1047  •  atorvastatin (LIPITOR) tablet 40 mg, 40 mg, Oral, HS, Felisa Carrillo MD, 40 mg at 09/19/23 2129  •  bisacodyl (DULCOLAX) rectal suppository 10 mg, 10 mg, Rectal, Daily PRN, Felisa Carrillo MD  •  calcium carbonate (TUMS) chewable tablet 500 mg, 500 mg, Oral, Daily PRN, LUKE Forte, 500 mg at 09/19/23 1735  •  citalopram (CeleXA) tablet 20 mg, 20 mg, Oral, Daily, Felisa Carrillo MD, 20 mg at 09/20/23 0815  •  fluticasone (FLONASE) 50 mcg/act nasal spray 1 spray, 1 spray, Each Nare, Daily, LUKE Forte, 1 spray at 09/20/23 1331  •  heparin (porcine) subcutaneous injection 5,000 Units, 5,000 Units, Subcutaneous, Q8H [address], Felisa Carrillo MD, 5,000 Units at 09/20/23 1500  •  hydrALAZINE (APRESOLINE) tablet 10 mg, 10 mg, Oral, Q8H PRN, SHANNON ForteNP, 10 mg at 09/20/23 1330  •  levETIRAcetam (KEPPRA) tablet 500 mg, 500 mg, Oral, Q12H [address], SHANNON ForteNP, 500 mg at 09/20/23 0815  •  loratadine (CLARITIN) tablet 10 mg, 10 mg, Oral, Daily, SHANNON ForteNP, 10 mg at 09/20/23 1330  •  magnesium Oxide (MAG-OX) tablet 400 mg, 400 mg, Oral, Daily, SHANNON ForteNP, 400 mg at 09/20/23 1138  •  melatonin tablet 3 mg, 3 mg, Oral, HS, SHANNON ForteNP  •  metoprolol tartrate (LOPRESSOR) tablet 12.5 mg, 12.5 mg, Oral, Q12H [address], Titus Markham CRNP, 12.5 mg at 09/20/23 0815  •  ofloxacin (OCUFLOX) 0.3 % ophthalmic solution 5 drop, 5 drop, Otic, Daily, Felisa Carrillo MD, 5 drop at 09/20/23 0816  •  ondansetron [facility]) injection 4 mg, 4 mg, Intravenous, Q6H PRN, Felisa Carrillo MD  •  ondansetron (ZOFRAN-ODT) dispersible tablet 4 mg, 4 mg, Oral, Q8H PRN, Felisa Carrillo MD, 4 mg at 09/20/23 1708  •  oxyCODONE (ROXICODONE) split tablet 2.5 mg, 2.5 mg, Oral, Q8H PRN, Felisa Carrillo MD  •  pantoprazole (PROTONIX) EC tablet 40 mg, 40 mg, Oral, Early Morning, Felisa Carrillo MD, 40 mg at 09/20/23 0558  •  polyethylene glycol (MIRALAX) packet 17 g, 17 g, Oral, Daily PRN, Elvie Mcardle, MD, 17 g at 09/19/23 2135  •  [START ON 9/21/2023] polyethylene glycol (MIRALAX) packet 17 g, 17 g, Oral, Daily, Elvie Mcardle, MD  •  saccharomyces boulardii [facility]) capsule 250 mg, 250 mg, Oral, BID, LUKE Hernandez, 250 mg at 09/20/23 1708  •  senna (SENOKOT) tablet 8.6 mg, 1 tablet, Oral, BID, Elvie Mcardle, MD, 8.6 mg at 09/20/23 1708  •  sodium bicarbonate tablet 1,300 mg, 1,300 mg, Oral, BID after meals, Elvie Mcardle, MD, 1,300 mg at 09/20/23 1708  •  [START ON 9/21/2023] torsemide (DEMADEX) tablet 20 mg, 20 mg, Oral, Every Other Day, Elvie Mcardle, MD      ASSESSMENT:   Juan Arshad. Dima Albert is a very pleasant 80year old  male who was admitted to [address] [place] [address] on 9/19/23 to regain strength and functioning s/p mechanical fall with head strike resulting in subarachnoid hemorrhage. He reports he was loading things into his truck when he lost his balance and fell backward. While he doesn't remember exactly what happened, his wife witnessed the fall. Pt has been  for many years and describes a supportive relationship. He denies any significantstress other than medical co-morbidities. Pt  maintained good eye contact and engaged appropriately throughout the interview. Pt was seen in his room at bedside, presented as pleasant,  cooperative and established rapport without difficulty. Mood was depressed but was consistent with context. No evidence of euphoria or emotional lability is present. Pt denies suicidal/homicidal ideation, intent or plan. He denies premorbid psychiatric history. He reports having difficulty falling asleep due to the beed feeling uncomfortable. He reports decreased appetite. He denies anxiety and admits to feeling discouraged about his declining medical and functional condition. No evidence of poor boundaries was present.   Pt. denies incidents of losing time or flash backs. No pressured speech, repetitive or perseverative behavior is present. No obsessive-compulsive or associated rituals. Pt's conversational speech was fluent and articulate with no evidence of word finding difficulty. In summary, Mr. Mariel John is struggling to cope with the decline in his health and functioning and may benefit by supportive psychotherapy during rehab to improve mood and coping utilizing  CBT and DBT techniques. DIAGNOSIS:  Adjustment Disorder with Depressed Mood      RECOMMENDATIONS:   I will follow Mr. Mariel John during his stay to provide the following interventions:    · Supportive psychotherapy, utilizing CBT and mindfulness strategies  · DBT distress tolerance techniques  to improve coping and mood  · Meditation and relaxation training          Thank you for the opportunity to participate in Mr. Wagner Fay care. Dilia Truong.  Vilma Fairbanks, Ph.D.  Licensed Psychologist

## 2023-09-20 NOTE — PROGRESS NOTES
ARC ICP  PT LTGS  ELOS 10- 14 days       09/20/23 0830   Rehab Team Goals   Transfer Team Goal Patient will be independent with transfers with least restrictive device upon completion of rehab program   Locomotion Team Goal Patient will be independent with locomotion with least restrictive device upon completion of rehab program   Rehab Team Interventions   PT Interventions Gait Training; Therapeutic Exercise;Neuromuscualr Reeducation;Transfer Training;Community Reintegration;Bed Mobility;Modalities; Patient/Family Education   PT Transfer Goal   Roll left and right Goal 06. Independent - Patient completes the activity by him/herself with no assistance from a helper. Sit to lying Goal 06. Independent - Patient completes the activity by him/herself with no assistance from a helper. Lying to sitting on side of bed Goal 06. Independent - Patient completes the activity by him/herself with no assistance from a helper. Sit to stand Goal 04. Supervision or touching assistance- [place] provides VERBAL CUES or supervision throughout activity. Chair/bed-to-chair transfer Goal 04. Supervision or touching assistance- [place] provides VERBAL CUES or supervision throughout activity. Car Transfer Goal 04. Supervision or touching assistance- [place] provides VERBAL CUES or supervision throughout activity. Assistive Device   (LRAD)   Status Ongoing; Target goal - two weeks; Other  (ELOS 10-14 days)   Locomotion Goal   Primary discharge mode of locomotion Walking   Target Walk Distance 300 ft   Assist Device   (LRAD)   Gait Pattern Improvement Inconsistant [place]; Excess Slow;Improper weight shift; Wide ANNA; Forward Flexion   Walk 10 feet Goal 04. Supervision or touching assistance- [place] provides VERBAL CUES or supervision throughout activity. Walk 50 feet with 2 turns Goal 04. Supervision or touching assistance- [place] provides VERBAL CUES or supervision throughout activity. Walk 150 feet Goal 04.  Supervision or touching assistance- [place] provides VERBAL CUES or supervision throughout activity. Walking 10 feet on uneven surface 04. Supervision or touching assistance- [place] provides VERBAL CUES or supervision throughout activity. Walking Goal Status Ongoing; Target goal - two weeks; Other  (ELOS 10-14 days)   Wheel 50 feet with 2 turns Goal 09. Not applicable   Wheel 608 feet Goal 09. Not applicable   Stairs Goal   1 step or curb goal 04. Supervision or touching assistance- [place] provides VERBAL CUES or supervision throughout activity. 4 steps Goal 04. Supervision or touching assistance- [place] provides VERBAL CUES or supervision throughout activity. 12 steps Goal 04. Supervision or touching assistance- [place] provides VERBAL CUES or supervision throughout activity. (does not need for DC home however does have FF in home.)   Number of Stairs 12   Technique Reciprocal   Hand Rail Left   Status Ongoing; Target goal - two weeks; Other  (ELOS 10- 14 days)   Object Retrieval Goal   Picking up object Goal 04. Supervision or touching assistance- [place] provides VERBAL CUES or supervision throughout activity. Assistive Device  None   Small Object Picked Up marker from floor - target goal ELOS 10- 14 days.

## 2023-09-20 NOTE — ASSESSMENT & PLAN NOTE
Lab Results   Component Value Date    EGFR 25 09/19/2023    EGFR 26 09/18/2023    EGFR 23 09/17/2023    CREATININE 2.29 (H) 09/19/2023    CREATININE 2.23 (H) 09/18/2023    CREATININE 2.45 (H) 09/17/2023     Creatinine currently 2.04. Baseline creatinine 2.5-2.8. Avoid nephrotoxins as able - losartan currently on hold. Receiving torsemide 20mg EOD. Ensure adequate hydration. Currently being seen by Vascular for possible fistula creation as OP - on hold due to cancer treatment. Continue sodium bicarbonate 1300mg BID.   Follows with Nephrology as outpatient - Dr. Curtis Portillo.

## 2023-09-20 NOTE — ASSESSMENT & PLAN NOTE
Lab Results   Component Value Date    HGBA1C 6.3 06/05/2023       Recent Labs     09/18/23  1702 09/19/23  0807 09/19/23  1156 09/19/23  1637   POCGLU 116 109 117 138       Blood Sugar Average: Last 72 hrs:     Currently diet controlled at home. A1c 6.3 on 6/5/2023. Elester Newer at home for renal protection. Continue SSI, QID accuchecks, and cons. carb diet. Change to BID accuchecks and discontinued sliding scale insulin today.

## 2023-09-20 NOTE — ASSESSMENT & PLAN NOTE
[address] on 9/16 showed chronic appearing R caudate lacunar infarct. Currently on Lipitor 40mg daily. Would benefit from starting antiplatelet - would need to discuss with Neurosurgery after follow-up in 2 weeks. Continue with PT/OT.

## 2023-09-20 NOTE — PROGRESS NOTES
SLP Cognitive Linguistic Communication Assessment       23 1030   Pain Assessment   Pain Assessment Tool 0-10   Pain Score No Pain   Restrictions/Precautions   Precautions Cognitive; Fall Risk;Visual deficit; Seizure; Goins   Cognitive Linguisitic Assessments   Ross Information Processing Assessment - Geriatric 2 (RIPA-G2) [facility] Financial Assessment-Geriatric: Second Edition     Subtest Raw Score Scaled Score Percentile Rank Degree of Severity   Immediate Memory 32 15 >99 WNL   Temporal Orientation 37 12 76 WNL   Spatial Orientation 45 13 >99 WNL   General Information  52 12 87 WNL   Situational Knowledge 44 12 87 WNL   Categorical Vocabulary 39 11 70 WNL   Listening Comprehension 62 14 98 WNL    Overall Summation Sum of Scaled Scores Composite Index Percentile Rank Overall Degree of Severity     89 119 99 WNL        Pt completed the RIPA-.  Pt's overall degree of severity is WNL upon completion of the above stated subtests when compared to age matched peers 80 years and older. During informal interview prior to testing, pt oriented x4, with some recall of accident and current injuries sustained. Pt with some mild hearing issues given Right middle ear trauma from injury however did not impact for assessment purposes. Pt has visual deficits at baseline, macular degeneration. Pt does not drive, and wife completes majority of IADL tasks. Pt stated he does complete his own meds by using a magnifying glass to read the print. Recommend follow up with pt to review scores as well as further plan for discharge and discuss at team regarding SLP need to follow for any cognitive tasks required at discharge as pt appears grossly WNL at this time of testing.       Comprehension   Assist Devices Glasses  (readers but reports they do not always help)   Auditory Basic  (pt with some mild hearing issues given Right middle ear trauma from injury)   Visual Basic   Findings Pt completed the RIPA: G-2- see SLP rehab note for details. QI: Comprehension 4. Undestands: Clear comprehension without cues or repetitions   Comprehension (FIM) 5 - Understands basic directions and conversation   Expression   Verbal Basic   Non-Verbal Basic   Intelligibility Sentence   Findings Pt completed the RIPA: G-2- see SLP rehab note for details. QI: Expression 4. Express complex messages without difficulty and with speech that is clear and easy to [place]   Expression (FIM) 6 - Has only MILD difficulty with complex/abstract info   Social Interaction   Cooperation with staff   Participation Individual   Behaviors observed Appropriate   Findings Pt completed the RIPA: G-2- see SLP rehab note for details. Social Interaction (FIM) 6 - Interacts appropriately with others BUT requires extra  time   Problem Solving   Routine Manages call bell   Findings Pt completed the RIPA: G-2- see SLP rehab note for details. Problem solving (FIM) 5 - Solves complex problems But requires cues from helper   Memory   Recognize People Yes   Remember Routine Yes   Initiates Tasks Yes   Short-Term Impaired   Long Term Intact   Recalls Precaution No   Findings Pt completed the RIPA: G-2- see SLP rehab note for details.    Memory (FIM) 5 - Rueter cues patient   SLP Therapy Minutes   SLP Time In 1030   SLP Time Out 1130   SLP Total Time (minutes) 60   SLP Mode of treatment - Individual (minutes) 60   SLP Mode of treatment - Concurrent (minutes) 0   SLP Mode of treatment - Group (minutes) 0   SLP Mode of treatment - Co-treat (minutes) 0   SLP Mode of Treatment - Total time(minutes) 60 minutes   SLP Cumulative Minutes 60

## 2023-09-20 NOTE — CONSULTS
[address] [facility]  Consult  Name: Jenna Martinez 80 y.o. male I MRN: [id number]  Unit/Bed#: -65 I Date of Admission: 9/19/2023   Date of Service: 9/20/2023 I Hospital Day: 1    Inpatient consult to Internal Medicine  Consult performed by: LUKE Vazquez  Consult ordered by: Gloria Barnett MD          Assessment/Plan   Insomnia  Assessment & Plan  Difficulty sleeping in the hospital setting. Has taken melatonin in the past.  Will start on 3mg at HS tonight. Sinus congestion  Assessment & Plan  Chronic. Uses over the counter nasal sprays and antihistamines. Started on Claritin and Flonase today. Encouraged to follow-up with ENT as outpatient. GERD (gastroesophageal reflux disease)  Assessment & Plan  Continue Protonix 40mg daily as substitute for non-formulary omeprazole. Started on PRN Tums due to complaints of indigestion. Leukocytosis  Assessment & Plan  WBC count currently 11.99 from 15.04. Chronically elevated as outpatient. Currently no active s/s of infection. Currently receiving Unasyn due to open fracture. UA obtained on 9/19 - negative for infection. Continue to trend routine CBC. Atrial flutter [facility])  Assessment & Plan  Noted to be in a-flutter in acute setting. ECHO obtained on 9/16: EF 50-55%, abnormal diastolic inflow due to atrial flutter, L atrium moderately dilated, and R atrium mildly dilated. Monitor routine VS.  Replete electrolytes as needed. Repeat Mg level due to taking magnesium supplements at home and not currently receiving. Obtain repeat EKG - last EKG showed QTc of 526. Currently not taking anticoagulation - [id number] score of 8. May benefit from anticoagulation - consider starting after follow-up with Neurosurgery. Follows with Dr. Lisa Berry (Cardiology) as outpatient.     Abnormal findings on imaging test  Assessment & Plan  CTA chest/abd/pelvis on 9/15 with multiple concerning lymph nodes/lesions for metastasis including: nodule at the L obturator internus muscle, L sided mesenteric lymph nodes, R external iliac lymph node, R sided retroperitoneal lymph nodes, hypodense structure along with R pelvic sidewall, lesions in the R inferior hepatic lobe, peritoneal soft tissue nodularity, R cardiophrenic lymph node, 2 enlarged retrocrural lymph nodes, and R middle lobe nodule. Follow-up with Hematology/Oncology as outpatient. History of stroke  Assessment & Plan  CTH on 9/16 showed chronic appearing R caudate lacunar infarct. Currently on Lipitor 40mg daily. Would benefit from starting antiplatelet - would need to discuss with Neurosurgery after follow-up in 2 weeks. Continue with PT/OT. Pneumocephalus  Assessment & Plan  Pneumocephalus within the sinus near the area of the fracture seen on CT. Consider obtaining CTA/CTV if concerns for sinus thrombosis. SDH (subdural hematoma) (HCC)  Assessment & Plan  S/p fall on 9/15. [address] on 9/15 showed small foci of subdural hemorrhage in the R insular region and temporal region without significant mass effect. Repeat CTH on 9/17 stable. Currently holding AC/AP. Has been restarted on DVT ppx per Neurosurgery. Neurovascular checks Q shift. Maintain SBP <160. Continue Keppra for 7 days for seizure ppx. Follow-up with Neurosurgery in 2 weeks with repeat CTH (9/29). Primary team following. PT/OT. Open fracture of temporal bone [facility])  Assessment & Plan  S/p fall on 9/15. CTH showed hematoma in the L parietal vertex and L occipital region, non-displaced fracture through the R temporal bone, linear non-displaced fracture through the posterior wall of the condyle with middle ear hemorrhage, fracture passes through the hypotympanum, non-displaced fracture through the R occipital bone. ENT consulted in acute setting - follow-up 1 week after discharge for audiogram.  No surgical intervention needed at this time.     Continue Unasyn IV for 7 days total for open fracture (complete on 9/22). Continue Floxin drops to R ear for 10 days total (complete on 9/25). Consider CTA/CTV as needed if concerns for sinus thrombosis given pneumocephalus within the sinus near the area of the fracture. Had not been done inpatient due to concerns of frequent contrast administration with CKD stage 4. Anemia due to stage 4 chronic kidney disease (HCC)  Assessment & Plan  Hgb currently 10.2. Had been receiving ferrous sulfate as OP. Vitamin B12, iron panel, and folate pending. Currently asymptomatic. Continue to trend routine CBC. Stage 4 chronic kidney disease [facility])  Assessment & Plan  Lab Results   Component Value Date    EGFR 25 09/19/2023    EGFR 26 09/18/2023    EGFR 23 09/17/2023    CREATININE 2.29 (H) 09/19/2023    CREATININE 2.23 (H) 09/18/2023    CREATININE 2.45 (H) 09/17/2023     Creatinine currently 2.04. Baseline creatinine 2.5-2.8. Avoid nephrotoxins as able - losartan currently on hold. Receiving torsemide 20mg EOD. Ensure adequate hydration. Currently being seen by Vascular for possible fistula creation as OP - on hold due to cancer treatment. Continue sodium bicarbonate 1300mg BID. Follows with Nephrology as outpatient - Dr. Vilma Bravo.    Benign hypertension with chronic kidney disease, stage IV [facility])  Assessment & Plan  Home regimen: losartan 25mg daily, metoprolol tartrate 25mg Q12, and torsemide 20mg EOD. Currently receiving amlodipine 5mg daily and torsemide 20mg EOD. Maintain SBP <160 due to recent intracranial bleed. Increase amlodipine to 5mg BID and start metoprolol tartrate 12.5mg Q12. Ordered PRN hydralazine. Continue to monitor BP with routine VS.  Follows with Dr. Chiquis Carvajal (Cardiology) and Dr. Vilma Bravo (Nephrology) as outpatient. Anxiety and depression  Assessment & Plan  Continue home Celexa 20mg daily. Supportive counseling as needed. Consider Neuropsych consult as needed.     Ischemic cardiomyopathy  Assessment & Plan  Experienced a-flutter in acute setting. ECHO on 9/16 showed EF 50-55%, abnormal diastolic inflow due to atrial flutter, L atrium moderately dilated, and R atrium mildly dilated. Continue with metoprolol tartrate 12.5mg Q12. Follows with Dr. Sarah Bryant (Cardiology) as outpatient. Coronary artery disease involving native coronary artery of native heart without angina pectoris  Assessment & Plan  Hx of CABG. Takes metoprolol tartrate 25mg Q12 at home. Restart metoprolol tartrate 12.5mg Q12 today. Continue Lipitor 40mg daily. Does not take ASA as outpatient - may benefit due to hx of CAD and possible stroke seen on CT. Recommend discussing AP/AC use at 2 week follow-up with Neurosurgery. Elevated troponins in acute setting - felt to be type II MI in setting of trauma. 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 Underwent radical cystoprostatectomy with ileal conduit at [facility]. Recurrence in 2022 and was started chemotherapy. Currently receiving Avelunab and aranesp for 3 months. Follows with Dr. Cassie Vences (Hem/Onc) as outpatient - due for appt on 9/26. Ordered PET scan for 9/19 but currently inpatient. CT chest/abd/pelvis in acute setting showed multiple concerns for metastasis. Follow-up with Hem/Onc as outpatient. Hyperlipidemia  Assessment & Plan  Continue home Lipitor 40mg daily. DMII (diabetes mellitus, type 2) [facility])  Assessment & Plan  Lab Results   Component Value Date    HGBA1C 6.3 06/05/2023       Recent Labs     09/18/23  1702 09/19/23  0807 09/19/23  1156 09/19/23  1637   POCGLU 116 109 117 138       Blood Sugar Average: Last 72 hrs:     Currently diet controlled at home. A1c 6.3 on 6/5/2023. Ari Rendon at home for renal protection. Continue SSI, QID accuchecks, and cons. carb diet. Change to BID accuchecks and discontinued sliding scale insulin today.     * SAH (subarachnoid hemorrhage) ([address])  Assessment & Plan  S/p fall on 9/15. [facility] on 9/15 showed multicompartmental hemorrhage with small foci of subarachnoid hemorrhage in the bilateral inferior frontal region and additional small foci of subarachnoid hemorrhage. Repeat CTH on 9/17 stable. Hold AC/AP. Has been restarted on DVT ppx per Neurosurgery. Neurovascular checks Q shift. Maintain SBP <160. Continue Keppra for 7 days for seizure ppx. Follow-up with Neurovascular in 2 weeks with repeat CTH (9/29). Primary team following. PT/OT. History of Present Illness:    Kody Dominguez is a 80 y.o. male with a PMH of CAD s/p CABG, ischemic cardiomyopathy, HTN, T2DM, CKD stage IV, and bladder cancer who originally presented to [facility] on 9/15/2023 after experiencing a fall backwards onto his head after trying to put items into the back of his truck. CTH showed hematoma in the L parietal vertex and L occipital region, non-displaced fracture through the R temporal bone, linear non-displaced fracture through the posterior wall of the condyle with middle ear hemorrhage, fracture passes through the hypotympanum, non-displaced fracture through the R occipital bone, multicompartmental hemorrhage with small foci of subarachnoid hemorrhage in the bilateral inferior frontal region, small foci of subdural hemorrhage in the R insular region and temporal region without significant mass effect, additional small foci of subarachnoid hemorrhage vs small contusions in the R temporal region. ENT was consulted and recommended OP follow-up with audiogram within 1 week of discharge. Felt that hearing loss in the R ear consistent with conductive pattern secondary to debris in the canal and middle ear. Started on Floxin drops to the R ear for 10 days and Unasyn due to the open fracture for 7 days. Neurosurgery was consulted and patient did not require surgical intervention.   Patient was started on Keppra for 7 days for seizure prophylaxis and AC/AP therapy is to be held.  Patient was restarted on DVT ppx. Will require follow-up with Neurosurgery in 2 weeks with repeat CTH and is to maintain SBP <160. Repeat CTH on 9/17 was stable. Cardiology was consulted due to new onset a-flutter along with elevated troponins. ECHO was obtained and showed EF 50-50% with abnormal diastolic inflow. Troponin was felt to be elevated due to type II MI in the setting of trauma. Will require follow-up with Cardiology as outpatient. Nephrology was consulted due to history of CKD stage 4 and ENT considering obtaining CTA/CTV to rule out sinus thrombosis due to pneumocephalus within the sinus near the area of the fracture. Nephrology recommended holding off on CTA/CTV due to recent CTA chest/abd/pelvis. Losartan was placed on hold at that time. Recommending if CTA/CTV is required to give patient fluids pre and post imaging. Patient was evaluated by PT/OT and was recommended for acute rehab. Admitted to [address] [place] [address] on 9/19/2023; we are consulted for medical clearance. Pt examined while pt sitting in bed in pt room. Currently denies any pain or headaches. States that he does intermittently have a R sided headache. Denies any visual changes besides macular degeneration which is chronic. Follows with an Ophthalmologist for this. Does feel that his hearing is "muffled" in the R ear. Denies any drainage or ear pain. Has chronic sinus congestion around the forehead and below the eyes. States that he uses multiple sinus decongestants at home along with antihistamines and nasal sprays. Encouraged to follow-up with ENT on discharge and will start on Claritin and Flonase at this time. Does have intermittent productive cough and post-nasal drip. Denies any SOB, palpitations, or CP. Denies any abdominal pain. LBM was last Friday. Has been passing gas. Denies any urinary complaints - has ileal conduit. Feels that his urine production is baseline.   Denies any numbness/tingling to upper or lower extremities. Feels that his lower extremities have been progressively getting weaker over the years and uses a cane at home. Lives at home with his wife who is very helpful. Manages his own medications at home. Review of Systems:    Review of Systems   Constitutional: Positive for appetite change (decreased appetite since being in the hospital). Negative for chills, fatigue and fever. HENT: Positive for congestion (chronic sinus congestion), postnasal drip and rhinorrhea. Negative for dental problem, ear discharge, ear pain, facial swelling, sinus pressure, sinus pain, sneezing and trouble swallowing. Muffled sounds from R ear   Eyes: Negative for visual disturbance. Respiratory: Positive for cough (chronic productive cough). Negative for chest tightness, shortness of breath, wheezing and stridor. Cardiovascular: Negative for chest pain, palpitations and leg swelling. Gastrointestinal: Positive for constipation. Negative for abdominal distention, abdominal pain, diarrhea, nausea and vomiting. LBM was last Friday, denies abdominal pain, passing gas   Genitourinary: Negative for difficulty urinating. Has ileal conduit   Musculoskeletal: Positive for gait problem (chronic, uses a cane). Negative for arthralgias and back pain. Neurological: Positive for weakness (chronic B/L lower extremity weakness) and light-headedness (experienced lightheadedness while ambulating with therapy). Negative for dizziness, numbness and headaches. Psychiatric/Behavioral: Negative for dysphoric mood and sleep disturbance. The patient is not nervous/anxious. All other systems reviewed and are negative.       Past Medical and Surgical History:     Past Medical History:   Diagnosis Date   • Acute kidney injury [facility])    • Anemia Jan. 2022   • Arthritis     Hands   • Wright esophagus    • BPH with obstruction/lower urinary tract symptoms    • CAD (coronary artery disease)     Last assessed 09/16/15   • Cancer [facility])     bladder   • Cataract, acquired     Last assessed 10/10/17   • Chronic kidney disease    • Coronary artery disease    • Diabetes mellitus ([address])     NIDDM   • Diabetic neuropathy ([address])     Feet   • Enlarged prostate with lower urinary tract symptoms (LUTS)     Last assessed 10/10/17   • Erectile dysfunction    • GERD (gastroesophageal reflux disease)     Last assessed 10/10/17   • Hemoptysis     Last assessed 03/14/16   • Hypercholesterolemia     Last assessed 10/10/17   • Hypertension     Last assessed 10/10/17   • Kidney stone    • Macular degeneration     Right eye is particularly affected-peripheral vision intact   • Myocardial infarction [facility]) 1998   • OAB (overactive bladder)    • Testicular hypofunction    • Testicular hypogonadism     Last assessed 10/10/17   • Tinnitus    • Umbilical hernia     Last assessed 06/18/14   • Urge incontinence of urine    • Wears glasses        Past Surgical History:   Procedure Laterality Date   • BLADDER SURGERY     • COLONOSCOPY     • CORONARY ARTERY BYPASS GRAFT  07/16/2014    ABG x 4 LIMA to LAD,SVG to diagnoal 2 SVG to OM-1, SVG to PDA, resection of partial plerual mass   • CT GUIDED PERC DRAINAGE CATHETER PLACEMENT  02/19/2021   • CYSTOSCOPY  2013   • CYSTOSCOPY  02/08/2022    Olya   • ESOPHAGOGASTRODUODENOSCOPY     • FL RETROGRADE PYELOGRAM  12/20/2018   • FL RETROGRADE PYELOGRAM  12/05/2019   • INGUINAL HERNIA REPAIR Bilateral 2015   • IR DRAINAGE TUBE CHECK AND/OR REMOVAL  03/05/2021   • PHOTODYNAMIC THERAPY      For Barretts esophagus   • WV COLONOSCOPY FLX DX W/COLLJ SPEC WHEN PFRMD N/A 04/25/2016    Procedure: COLONOSCOPY;  Surgeon: Sara Burgos MD;  Location: [facility];   Service: Gastroenterology   • WV CYSTO W/REMOVAL OF LESIONS SMALL N/A 12/05/2019    Procedure: CYSTO W/TURBT AND TRANSURETHRAL PROSTATE BIOPSY AND OPENING OF BLADDER NECK CONTRACTURE, B/L Retrograde pyelogram;  Surgeon: Kassandra Castaneda MD;  Location: [facility];  Service: Urology   • NE CYSTOURETHROSCOPY W/DEST &/RMVL MED BLADDER TY N/A 2020    Procedure: CYSTOSCOPY, TRANSURETHRAL RESECTION OF BLADDER TUMOR (TURBT); Surgeon: Margarita Rivera MD;  [facility];  Service: Urology   • NE CYSTOURETHROSCOPY WITH BIOPSY N/A 09/10/2020    Procedure: CYSTOSCOPY, COLLECTION OF LEFT KIDNEY CYTOLOGY, BILATERAL RETROGRADE PYELOGRAM, BLADDER WALL BIOPSIES  AND FULGERAION, RANDOM BLADDER TUMOR BIOPSIES;  Surgeon: Margarita Rivera MD;  Location: [address] [place] [address] MAIN OR;  Service: Urology   • NE CYSTOURETHROSCOPY WITH BIOPSY N/A 2022    Procedure: urethroscopy , biopsy of urethra with fulgeration;  Surgeon: Stewart Marvin MD;  Location:  MAIN OR;  Service: Urology   • PROSTATE SURGERY     • TONSILLECTOMY     • TRANSURETHRAL RESECTION OF PROSTATE N/A 2018    Procedure: CYSTO, PHOTO SELECTIVE VAPORIZATION OF PROSTATE, B/L RETROGRADE PYELOGRAM, TURBT;  Surgeon: Margarita Rivera MD;  Location: [facility];  Service: Urology   • UMBILICAL HERNIA REPAIR     • UPPER GASTROINTESTINAL ENDOSCOPY         Meds/Allergies:    all medications and allergies reviewed    Allergies: Allergies   Allergen Reactions   • Fentanyl Hallucinations   • Morphine And Related Other (See Comments)     While having an MI patient received morphine and had adverse reaction but doesn't know what happened.        Social History:     Marital Status: /Civil Union    Substance Use History:   Social History     Substance and Sexual Activity   Alcohol Use Not Currently   • Alcohol/week: 2.0 standard drinks of alcohol   • Types: 1 Glasses of wine, 1 Cans of beer per week    Comment: Non since 7/15/2020     Social History     Tobacco Use   Smoking Status Former   • Packs/day: 1.00   • Years: 10.00   • Total pack years: 10.00   • Types: Cigarettes   • Quit date: 1972   • Years since quittin.7   Smokeless Tobacco Never   Tobacco Comments    Quit at age 28 Social History     Substance and Sexual Activity   Drug Use Never       Family History:  Reviewed    Physical Exam:     Vitals:   Blood Pressure: 162/90 (09/20/23 0814)  Pulse: 66 (09/20/23 0814)  Temperature: 99.6 °F (37.6 °C) (09/19/23 2019)  Temp Source: Tympanic (09/19/23 2019)  Respirations: 18 (09/19/23 2019)  Height: 5' 7" (170.2 cm) (09/19/23 1514)  Weight - Scale: 83.9 kg (184 lb 15.5 oz) (09/19/23 1514)  SpO2: 94 % (09/19/23 1503)    Physical Exam  Vitals and nursing note reviewed. Constitutional:       General: He is not in acute distress. Appearance: Normal appearance. He is not ill-appearing. HENT:      Head: Normocephalic and atraumatic. Cardiovascular:      Rate and Rhythm: Normal rate and regular rhythm. Pulses: Normal pulses. Heart sounds: Normal heart sounds. No murmur heard. No friction rub. Pulmonary:      Effort: Pulmonary effort is normal. No respiratory distress. Breath sounds: Normal breath sounds. No wheezing or rhonchi. Abdominal:      General: Abdomen is flat. Bowel sounds are normal. There is no distension. Palpations: Abdomen is soft. There is no mass. Tenderness: There is no abdominal tenderness. There is no guarding or rebound. Hernia: No hernia is present. Genitourinary:     Comments: Ileal conduit, draining clear, yellow urine. Musculoskeletal:      Cervical back: Normal range of motion and neck supple. No tenderness. Right lower leg: No edema. Left lower leg: No edema. Skin:     General: Skin is warm and dry. Capillary Refill: Capillary refill takes less than 2 seconds. Findings: Bruising (ecchymosis surrounding B/L eyes) present. Neurological:      Mental Status: He is alert and oriented to person, place, and time. Psychiatric:         Mood and Affect: Mood normal.         Behavior: Behavior normal.           Additional Data:     Labs and imaging reviewed.     EKG Reviewed - last EKG on 9/15 showed atrial flutter, RBBB, QTc of 526. M*Modal software was used to dictate this note. It may contain errors with dictating incorrect words or incorrect spelling. Please contact the provider directly with any questions.

## 2023-09-20 NOTE — ASSESSMENT & PLAN NOTE
CTA chest/abd/pelvis on 9/15 with multiple concerning lymph nodes/lesions for metastasis including: nodule at the L obturator internus muscle, L sided mesenteric lymph nodes, R external iliac lymph node, R sided retroperitoneal lymph nodes, hypodense structure along with R pelvic sidewall, lesions in the R inferior hepatic lobe, peritoneal soft tissue nodularity, R cardiophrenic lymph node, 2 enlarged retrocrural lymph nodes, and R middle lobe nodule. Follow-up with Hematology/Oncology as outpatient.

## 2023-09-20 NOTE — ASSESSMENT & PLAN NOTE
S/p fall on 9/15. [address] on 9/15 showed small foci of subdural hemorrhage in the R insular region and temporal region without significant mass effect. Repeat CTH on 9/17 stable. Currently holding AC/AP. Has been restarted on DVT ppx per Neurosurgery. Neurovascular checks Q shift. Maintain SBP <160. Continue Keppra for 7 days for seizure ppx. Follow-up with Neurosurgery in 2 weeks with repeat CTH (9/29). Primary team following. PT/OT.

## 2023-09-20 NOTE — ASSESSMENT & PLAN NOTE
[facility] 9/29 - Unchanged subacute comminuted otic sparing right temporal bone fracture involving the squamosal, mastoid, and tympanic portions with diastases of occipitotemporal suture and extension of fracture into right occipital calvarium. Prior imaging  - Acute comminuted otic sparing right temporal bone fracture involving the squamosal, mastoid, and tympanic portions with diastases of occipitotemporal suture and extension of fracture into visualized right occipital calvarium. Recommend follow-up CTV   head with contrast to assess underlying patency of right dural venous sinuses. - Probable small-to-moderate RIGHT-sided bloody mastoid effusion, large bloody middle ear effusion, and probable debris and blood products throughout external auditory canal.  - Small scalp hematoma in right occipito-temporal region.     LEFT TEMPORAL BONE CT  -Trace left mastoid effusion. Otherwise, normal LEFT temporal bone CT.     Per ENT  "right otic sparing temporal bone fracture   -Facial nerve intact, no need for surgical decompression  -Hearing loss in right ear- tuning fork exam consistent with conductive pattern secondary to debris in canal and middle ear-Recommend outpatient ENT follow-up with audiogram 1 week following discharge  -Floxin drops twice daily 5 drops to R ear for 10 days due to debris in canal  -Please do not hesitate to reach out with any questions or concerns"  - OK to use Floxin drops in ear - will need full 10 day course starting now (was getting in eye) thru 9/28  - Unasyn for 1 week course completed 9/22  - Did not undergo CTV/CTA due to concern for kidney function and now NSx not currently recommending it

## 2023-09-20 NOTE — PROGRESS NOTES
SLP ARC TAA       09/20/23 1030   Patient Data   Rehab Impairment Impairment of mobility, safety and Activities of Daily Living (ADLs) due to Brain Dysfunction: 02.22 Traumatic. Closed Injury   Etiologic Diagnosis temporal bone fracture with associated subdural hematoma and subarachnoid hemorrhage   Date of Onset 09/15/23   Support System   Name Lelo Velázquez   Relationship spouse   Able to provide 24 hour supervision Yes   Able to provide physical help? No   Baseline Information   Transportation Family/friends drive  (pt does not drive due to vision)   Prior IADL Participation   Money Management   (wife completes)   Meal Preparation   (wife completes)   Laundry   (wife completes)   Home Cleaning   (wife completes)   Patient Preference   Nickname (Patient Preference) Abisai Jain   Psychosocial   Psychosocial (WDL) WDL   Patient Behaviors/Mood Cooperative   Restrictions/Precautions   Precautions Cognitive; Fall Risk;Visual deficit; Seizure; Goins   Pain Assessment   Pain Assessment Tool 0-10   Pain Score No Pain   Comprehension   Assist Devices Glasses  (readers but reports they do not always help)   Auditory Basic  (pt with some mild hearing issues given Right middle ear trauma from injury)   Visual Basic   Findings Pt completed the RIPA: G-2- see SLP rehab note for details. QI: Comprehension 4. [place]ands: Clear comprehension without cues or repetitions   Comprehension (FIM) 5 - Understands basic directions and conversation   Expression   Verbal Basic   Non-Verbal Basic   Intelligibility Sentence   Findings Pt completed the RIPA: G-2- see SLP rehab note for details. QI: Expression 4. Express complex messages without difficulty and with speech that is clear and easy to [place]   Expression (FIM) 6 - Has only MILD difficulty with complex/abstract info   Social Interaction   Cooperation with staff   Participation Individual   Behaviors observed Appropriate   Findings Pt completed the RIPA: G-2- see SLP rehab note for details. Social Interaction (FIM) 6 - Interacts appropriately with others BUT requires extra  time   Problem Solving   Routine Manages call bell   Findings Pt completed the RIPA: G-2- see SLP rehab note for details. Problem solving (FIM) 5 - Solves complex problems But requires cues from helper   Memory   Recognize People Yes   Remember Routine Yes   Initiates Tasks Yes   Short-Term Impaired   Long Term Intact   Recalls Precaution No   Findings Pt completed the RIPA: G-2- see SLP rehab note for details. Memory (FIM) 5 - [place] cues patient   Discharge Information   Vocational Plan Retired/not working   Patient's Discharge Plan home with wife support   Patient's Rehab Expectations "survival- to keep living"   Barriers to Discharge Home Limited Family Support;Decreased Strength;Decreased Endurance; Safety Considerations   Impressions Per H&P- Pt is a 81 yo male with PMHx PMH of CAD s/p CABG, ischemic cardiomyopathy, HTN, T2DM, CKD stage IV, and bladder cancer who presented to the [address] [place] [address] on 9/15/2023 s/p fall and head strike. CTH showed hematoma in the L parietal vertex and L occipital region, non-displaced fracture through the R temporal bone, linear non-displaced fracture through the posterior wall of the condyle with middle ear hemorrhage, fracture passes through the hypotympanum, non-displaced fracture through the R occipital bone, multicompartmental hemorrhage with small foci of subarachnoid hemorrhage in the bilateral inferior frontal region, small foci of subdural hemorrhage in the R insular region and temporal region without significant mass effect, additional small foci of subarachnoid hemorrhage vs small contusions in the R temporal region. Neurosurgery was consulted and ordered 7 days Keppra for seizure ppx- no surgical intervention. Treated with Unasyn for his open temporal bone fracture. He had continued bleeding from right ear s/p administration of afrin.   ENT was consulted and noted hearing loss in right ear- tuning fork exam consistent with conductive pattern secondary to debris in canal and middle ear. Outpatient follow-up was recommended with audiogram after discharge. Pt with new onset atrial flutter - Per cards consult, HR is well controlled. Continue beta-blocker. Anticoagulation is recommended for stroke prevention, but not able to start this yet due to bleed. Recommended starting Eliquis once cleared from a neurosurgical standpoint. He is to follow-up with neurosurgery after discharge. Pt was approved for acute rehab and transferred on 9/19/2023. Orders received for skilled cognitive assessment which was initiated using the RIPA-G2 assessment tool. Initial interview with review of history, orientation and events completed as well. At this time, pt scoring WNL on standardized assessment- plan to review test with pt as well as confirm plan for discharge as wife completed majority of IADL tasks. Further discussion to be had with physician and treatment team. ELOS ~10-14 days.    SLP Therapy Minutes   SLP Time In 1030   SLP Time Out 1130   SLP Total Time (minutes) 60   SLP Mode of treatment - Individual (minutes) 60   SLP Mode of treatment - Concurrent (minutes) 0   SLP Mode of treatment - Group (minutes) 0   SLP Mode of treatment - Co-treat (minutes) 0   SLP Mode of Treatment - Total time(minutes) 60 minutes   SLP Cumulative Minutes 60   Cumulative Minutes   Cumulative therapy minutes 120

## 2023-09-20 NOTE — PCC SPEECH THERAPY
Pt completed the RIPA-.  Pt's overall degree of severity is WNL upon completion of the above stated subtests when compared to age matched peers 80 years and older. During informal interview prior to testing, pt oriented x4, with some recall of accident and current injuries sustained. Pt with some mild hearing issues given Right middle ear trauma from injury however did not impact for assessment purposes. Pt has visual deficits at baseline, macular degeneration. Pt does not drive, and wife completes majority of IADL tasks. Pt stated he does complete his own meds by using a magnifying glass to read the print. Recommend follow up with pt to review scores as well as further plan for discharge and discuss at team regarding SLP need to follow for any cognitive tasks required at discharge as pt appears grossly WNL at this time of testing. Update week of 2023: Pt continues to be followed for skilled SLP services focusing on cognitive linguistic skills. Pt is making steady progress towards goals at this time, however, does continue to present with deficits which act as barriers at this time in the following areas: attention, processing, STM recall, working memory, executive functions and insight. ST team has provided updates to pt's wife, as well as education regarding pt's current level of functioning, assessment results and role of SLP. Currently, pt is functioning at mod I for expression, supervision for comprehension and min assist for problem solving and memory. At this time, pt is recommended for further skilled SLP services targeting overall and higher level cognitive linguistic skills to maximize level of functioning and independence on discharge.

## 2023-09-20 NOTE — PLAN OF CARE
Problem: NEUROSENSORY - ADULT  Goal: Remains free of injury related to seizures activity  Description: INTERVENTIONS  - Maintain airway, patient safety  and administer oxygen as ordered  - Monitor patient for seizure activity, document and report duration and description of seizure to physician/advanced practitioner  - If seizure occurs,  ensure patient safety during seizure  - Reorient patient post seizure  - Seizure pads on all 4 side rails  - Instruct patient/family to notify RN of any seizure activity including if an aura is experienced  - Instruct patient/family to call for assistance with activity based on nursing assessment  - Administer anti-seizure medications if ordered    Outcome: Progressing     Problem: PAIN - ADULT  Goal: Verbalizes/displays adequate comfort level or baseline comfort level  Description: Interventions:  - Encourage patient to monitor pain and request assistance  - Assess pain using appropriate pain scale  - Administer analgesics based on type and severity of pain and evaluate response  - Implement non-pharmacological measures as appropriate and evaluate response  - Consider cultural and social influences on pain and pain management  - Notify physician/advanced practitioner if interventions unsuccessful or patient reports new pain  Outcome: Progressing

## 2023-09-20 NOTE — ASSESSMENT & PLAN NOTE
Cr 2.38 prior to d/c  Per IM Baseline creatinine 2.5-2.8. Monitor BUN/Cr intermittently as well as electrolytes   IM consulted to manage  Limit nephrotoxic agents when possible  Optimal BP mgmt   Per IM  "Currently being seen by Vascular for possible fistula creation as OP - on hold due to cancer treatment. Continue sodium bicarbonate 1300mg BID.   Follows with Nephrology as outpatient - Dr. Rosaline Joy."  - Kip  to resume chronic calcitriol per discussion with IM prior to d/c

## 2023-09-20 NOTE — ASSESSMENT & PLAN NOTE
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

## 2023-09-20 NOTE — TREATMENT PLAN
Individualized Plan of [address] [place] [address],[address] 80 y.o. male MRN: [id number]  Unit/Bed#: -01 Encounter: [id number]     PATIENT INFORMATION  ADMISSION DATE: 9/19/2023  2:37 PM MINNA CATEGORY:Brain Dysfunction:  02.22  Traumatic, Closed Injury   ADMISSION DIAGNOSIS: Subdural and subarachnoid hemorrhage with temporal bone fracture EXPECTED LOS: 16 days     MEDICAL/FUNCTIONAL PROGNOSIS  Pre-admit screens and post-admit evaluations reviewed and are consistent. Based on my assessment of the patient's medical conditions and current functional status, the prognosis for attaining medical and functional goals or the IRF stay is:  Good. Patient is appropriate for acute rehabilitation. Medical Goals:   Patient will be medically stable for discharge back to community setting upon completion or acute rehab program and patient/family will be able to manage medical conditions and comorbid conditions with medications and appropriate follow up upon completion of rehab program.    Cardiopulmonary function/Anemia: Ensure cardiopulmonary stability and optimize cardiopulmonary function not only at rest but with activity as patient's activity level significantly increases in acute rehab compared with prior to transfer in preparation for safe discharge from [facility]. Must closely and frequently monitor blood pressure, HR, anemia to ensure adequate cardiac output during ADLs and ambulation as patient is at increased risk for orthostatic hypotension/syncope and potential injury if not monitored for and managed adequately. Blood pressure management:    Frequent monitoring of blood pressure with appropriate adjustments in blood pressure medication management to optimize blood pressure control and prevent/limit renal complications. Monitoring impact of blood pressure and side-effects of blood pressure medications at rest and with activity.   Pain management:  Pain will improve with frequent evaluation of pain, careful adjustments in medications, frequent re-evaluation of patient's pain and medical/neurologic status to ensure optimal pain control, avoidance of potential serious and even life-threatening side-effects and drug interactions, as well as weaning pain medications as soon as possible to decrease risk of short and long-term use. Brain injury:  Patient's cognitive status is significantly impaired and neurologic status can fluctuate particularly early in rehabilitation process. Patient requires frequent rehab physician and rehab nursing neurologic monitoring for subtle and more significant changes in mentation and neurologic function. Labs must be monitored closely along with hydration and nutritional status. Low threshold to obtain brain imaging. Medications must be carefully monitored and adjusted to optimize functional recovery while limit cognitive impairments. Goal to improve cognitive and neurologic function, provide appropriate patient (and/or family education), and to ensure appropriate optimal discharge plan. Neurologic Disorder: TBI causing impaired mobility, ADLs, and gait:  intensive skilled therapies with physical therapy and occupational therapy with close oversight and management by rehab specialized physician in acute rehabilitation setting to most expeditiously and effectively improve functional mobility, transfers, upper and lower body strengthening, conditioning, balance, and gait training with appropriate assistive device. Patient will have optimal supervision and management of patient's underlying neurologic disorder with specialized rehabilitation physician during this period of recovery to ensure most expeditious and optimal recovery with decreased risks of fall/injury and other complications including acute worsening of neuro disorder, decrease risk of VTE, PNA, and skin ulceration. Inpatient rehabilitation education/teaching:   To be provided to patient and typically family/caregiver (if able to be identified) by all skilled therapists, rehab nursing, case management, and rehab specialized physician to ensure optimal recovery and decrease risks of complications in both acute rehabilitation setting as well as after discharge. Chronic kidney failure management and blood pressure management: Frequent measurements and evaluation of urinary intake, urinary output, and labs which include BUN/Cr and electrolytes with appropriate adjustments in medication and when necessary order additional testing. Frequent monitoring of blood pressure with appropriate adjustments in blood pressure medication management to optimize blood pressure control and prevent/limit renal complications. Cath management: Appropriate urinary retention management   Skin management: Increased risk of skin wounds/breakdown/rashes due to recent immobility and co-morbidities. Appropriate skin checks from nursing and as needed physician. Frequent turning, application of appropriate barrier creams/dressings, cushions. Patient/caregiver education. Optimal bowel/bladder hygiene and mobilization. ANTICIPATED DISCHARGE DISPOSITION AND SERVICES  COMMUNITY SETTING:    Previous community setting. ANTICIPATED FOLLOW-UP SERVICE:   Outpatient Therapy PT, OT ST v  Home Health Services: PT, OT ST    DISCIPLINE SPECIFIC PLANS:  Required Disciplines & Services: PT, OT, ST, Rehabilitation Physician, Rehabillitation Nursing, Case Management, Dietary, Psychology    REQUIRED THERAPY (expected): Therapy Hours per Day Days per Week Tx Days (Days in [facility])   Physical Therapy 1 5 16   Occupational Therapy 1 5 16   Speech/Language Therapy 1 5 16   NOTE: Additional therapy time(s) may be added as appropriate to meet patient needs and to achieve functional goals.       ANTICIPATED FUNCTIONAL OUTCOMES:  ADL: Patient will be largely supervision   Bladder/Bowel: Patient will be supervision-modified independent with bladder/bowel management upon completion of rehab program   Transfers: Patient will be supervision-modified independent with transfers upon completion of rehab program   Locomotion: Patient will be at or near supervision with locomotion (wheelchair or ambulation) upon completion of rehab program   Cognitive: Patient will be closer to prior level of cognitive function upon completion of rehab program     DISCHARGE PLANNING NEEDS  Equipment needs: To be determined at team conference. REHAB ANTICIPATED PARTICIPATION RESTRICTIONS:  Uncertain at this time. To be determined closer to discharge.

## 2023-09-20 NOTE — ASSESSMENT & PLAN NOTE
Continue Protonix 40mg daily as substitute for non-formulary omeprazole. Started on PRN Tums due to complaints of indigestion.

## 2023-09-20 NOTE — ASSESSMENT & PLAN NOTE
WBC count currently 11.99 from 15.04. Chronically elevated as outpatient. Currently no active s/s of infection. Currently receiving Unasyn due to open fracture. UA obtained on 9/19 - negative for infection. Continue to trend routine CBC.

## 2023-09-20 NOTE — ASSESSMENT & PLAN NOTE
Hgb currently 10.2. Had been receiving ferrous sulfate as OP. Vitamin B12, iron panel, and folate pending. Currently asymptomatic. Continue to trend routine CBC.

## 2023-09-20 NOTE — PROGRESS NOTES
[facility] TAA  PT EVAL       09/20/23 0830   Patient Data   Rehab Impairment Impairment of mobility, safety and Activities of Daily Living (ADLs) due to Brain Dysfunction:  02.22  Traumatic, Closed Injury   Etiologic Diagnosis temporal bone fracture with associated subdural hematoma and subarachnoid hemorrhage   Date of Onset 09/15/23   Support System   Name Westley Tobias   Relationship spouse   Able to provide 24 hour supervision Yes   Able to provide physical help?   (variable, per pt not very strong.)   Home Setup   Type of Home Other  (Condo)   Method of Entry Stairs   Number of Stairs (S)  2  (1+1 front entrance, 3 steps up from garage L HR, R grab bar)   Number of Stairs in Home 11  (steps up to loft, FF down to basement- main living is on main floor - pt does not have access other level)   First Floor Setup Available Yes   Home Modification Comment pending progress   Baseline Information   Vocation Other  (retired realtor.)   Transportation Family/friends drive  (spouse drives. Pt has not drove in x1 year due to vision.)   Prior Device(s) Used [employer]  ([facility] mainly outdoors.)   Prior Level of Function   Self-Care 3. Independent - Patient completed the activities by him/herself, with or without an assistive device, with no assistance from a helper. Indoor-Mobility (Ambulation) 3. Independent - Patient completed the activities by him/herself, with or without an assistive device, with no assistance from a helper. Stairs 3. Independent - Patient completed the activities by him/herself, with or without an assistive device, with no assistance from a helper. Functional Cognition 3. Independent - Patient completed the activities by him/herself, with or without an assistive device, with no assistance from a helper. Prior Assistance Needed for Driving;Household Chores/Cleaning;Meal Preparation   Prior Device Used Z.  None of the above  [facility])   Falls in the Last Year   Number of falls in the past 12 months 2  (this incident - and tripped over feet in kitchen- fell backwards)   Type of Injury Associated with Fall Major injury   Patient Preference   Liliane (Patient Preference) Melodie Mcclure   Psychosocial   Psychosocial (WDL) WDL   Patient Behaviors/Mood Anxious; Cooperative   Restrictions/Precautions   Precautions (S)  Cognitive; Fall Risk;Visual deficit; Seizure;Pain; Sweet  (h/o macular degeneration, + illeal conduit to sweet bag) - by afternoon notified pt to be manual Bps only. Pain Assessment   Pain Assessment Tool 0-10   Pain Score 2   Pain Location/Orientation Location: Head   Pain Onset/Description Onset: Ongoing;Frequency: Intermittent; Descriptor: Pressure   Transfer Bed/Chair/Wheelchair   Positioning Concerns Cognition; Other  (vision)   Limitations Noted In Balance; Coordination   Adaptive Equipment Roller Walker   Type of Assistance Needed Physical assistance;Verbal cues   Physical Assistance Level 25% or less   Comment Min A for balance mild posterior lean at times. Cues for hand placement with RW support. Chair/Bed-to-Chair Transfer CARE Score 3   Roll Left and Right   Type of Assistance Needed Supervision   Physical Assistance Level No physical assistance   Roll Left and Right CARE Score 4   Sit to Lying   Type of Assistance Needed Incidental touching   Physical Assistance Level No physical assistance   Sit to Lying CARE Score 4   Lying to Sitting on Side of Bed   Type of Assistance Needed Incidental touching   Physical Assistance Level No physical assistance   Lying to Sitting on Side of Bed CARE Score 4   Sit to Stand   Type of Assistance Needed Physical assistance;Verbal cues   Physical Assistance Level 25% or less   Comment CG/min A for balance, use of RW for safety, +posterior lean at times. Sit to Stand CARE Score 3   Picking Up Object   Type of Assistance Needed Physical assistance   Physical Assistance Level 26%-50%   Comment mod A for balance without AD for marker pickup from floor.    Picking Up Object CARE Score 3   Car Transfer   Type of Assistance Needed Physical assistance   Physical Assistance Level 25% or less   Comment based on pts current clinical presentation, pt to require min A for car transfer, can simulate as able. Car Transfer CARE Score 3   Ambulation   Primary Mode of Locomotion Prior to Admission Walk   Distance Walked (feet) 125 ft  (170)   Assist Device Roller Walker   Gait Pattern Ataxic; Inconsistant Ifrah; Wide ANNA; Poor UE WB;Step through; Improper weight shift   Limitations Noted In Balance;Speed;Strength   Provided Assistance with: Balance   Walk Assist Level Contact Guard;Minimum Assist;Moderate Assist   Findings x1 incident of posterior lean with amb requiring mod A for balance correction, otherwise CGA with RW support, WC pulled behind to have chair avail once ftgd. Walk 10 Feet   Type of Assistance Needed Incidental touching   Physical Assistance Level No physical assistance   Comment with RW   Walk 10 Feet CARE Score 4   Walk 50 Feet with Two Turns   Type of Assistance Needed Physical assistance   Physical Assistance Level 25% or less   Comment Cg/min A with RW   Walk 50 Feet with Two Turns CARE Score 3   Walk 150 Feet   Type of Assistance Needed Physical assistance   Physical Assistance Level 26%-50%   Comment mod A for balance with posterior lean, mainly CGA otherwise. Walk 150 Feet CARE Score 3   Walking 10 Feet on Uneven Surfaces   Type of Assistance Needed Physical assistance   Physical Assistance Level 26%-50%   Comment min/mod A with rw over mat on floor for balance and RW mgmt.    Walking 10 Feet on Uneven Surfaces CARE Score 3   Wheelchair mobility   Findings anticipate ambulatory at ND, pt does have transport chair   Wheel 50 Feet with Two Turns   Reason if not Attempted Activity not applicable   Wheel 50 Feet with Two Turns CARE Score 9   Wheel 150 Feet   Reason if not Attempted Activity not applicable   Wheel 641 Feet CARE Score 9   Curb or Single Stair   Style negotiated Single stair   Type of Assistance Needed Physical assistance   Physical Assistance Level 25% or less   Comment B HR,   1 Step (Curb) CARE Score 3   4 Steps   Type of Assistance Needed Physical assistance   Physical Assistance Level 25% or less   Comment min A with B HR, self selcted reciprocal sequence. 4 Steps CARE Score 3   12 Steps   Comment pt has FF at home up to loft and down to basement however does not need for DC home. Reason if not Attempted Safety concerns   12 Steps CARE Score 88   Stairs   Type  Steps   # of Steps 4   Weight Bearing Precautions Fall Risk   Assist Devices Bilateral Rail   Memory   Initiates Tasks Yes   Short-Term Impaired   Long Term Impaired   Recalls Precaution No   RLE Assessment   RLE Assessment WFL   Strength RLE   RLE Overall Strength 4/5   LLE Assessment   LLE Assessment WFL   Strength LLE   LLE Overall Strength 4/5   Coordination   Movements are Fluid and Coordinated 1   Sensation   Light Touch No apparent deficits  (B LE)   Cognition   Overall Cognitive Status Impaired   Arousal/Participation Alert; Cooperative   Attention Attends with cues to redirect   Orientation Level Oriented X4   Memory Decreased short term memory;Decreased long term memory   Following Commands Follows one step commands without difficulty   Comments Pt reports being forgetful, asked PTs name x3, then was able to recall name by end of session. Vision   Vision Comments (S)  +Sig macular degeneration. has glasses however not very useful. Sees light changes and very few identifiable features. Perception   Inattention/Neglect Appears intact   Objective Measure   PT Findings orthostatic BP taken (via dynamap) supine 173/82, seated 175/82, standing 179/86   Discharge Information   Vocational Plan Retired/not working   Patient's Discharge Plan Return home with family support   Patient's Rehab Expectations to not have to rely on his wife to help him if possible.    Barriers to Discharge Home Limited Family Support;Decreased Cognitive Function;Decreased Strength;Decreased Endurance;Depression;Pain; Safety Considerations   Impressions Pt 80 yr old male sp fall moving item into his truck, +head strike, +temp bone fx, dx +SDH, SAH, +R ear bleeding. PMH: macular generation, CKD, CAD, BPH with h/o radical csytoprostectomy with illeal conduit (x3 yrs ago), DM, Mi. At baseline pt was generally fully I with use of SPC outdoors PRN, non  due to visual deficits. Has RW and transport chair. Lives with spouse in a multi-level condo, with main floor bed/bathroom, 1+1 no HR NANO from front, pt usually uses 3 NANO from garage L HR, R grab bar. Pt reports being forgetful and really not feeling motivated or not having much energy to do thing recently, C/o severe nausea this AM, +emesis prior to PT eval, reports having no appetite. Pt presents cooperative and somewhat engaging in PT eval, requiring min/mod A for balance due to variable posterior lean. Pt reports he generally fall backward. Extensive directional cues needed due baseline to visual deficits. Use of Rw t/o session for inc balance and safety, pt able to progress amb distance with chair pulled behind. Pt to benefit from skilled PT intervention to maxmize functional balance and safety. Pt demonstrates good rehab potential to reach S/mod I goals with LRAD pending balance and progress for safe DC home with family support with likely home PT to follow in [facility] 10-14 days.    PT Therapy Minutes   PT Time In 0830   PT Time Out 0930   PT Total Time (minutes) 60   PT Mode of treatment - Individual (minutes) 60   PT Mode of treatment - Concurrent (minutes) 0   PT Mode of treatment - Group (minutes) 0   PT Mode of treatment - Co-treat (minutes) 0   PT Mode of Treatment - Total time(minutes) 60 minutes   PT Cumulative Minutes 60   Cumulative Minutes   Cumulative therapy minutes 60

## 2023-09-20 NOTE — ASSESSMENT & PLAN NOTE
Continue home Celexa 20mg daily. Supportive counseling as needed. Consider Neuropsych consult as needed.

## 2023-09-20 NOTE — ASSESSMENT & PLAN NOTE
Hx of CABG. Takes metoprolol tartrate 25mg Q12 at home. Restart metoprolol tartrate 12.5mg Q12 today. Continue Lipitor 40mg daily. Does not take ASA as outpatient - may benefit due to hx of CAD and possible stroke seen on CT. Recommend discussing AP/AC use at 2 week follow-up with Neurosurgery. Elevated troponins in acute setting - felt to be type II MI in setting of trauma.

## 2023-09-20 NOTE — ASSESSMENT & PLAN NOTE
Home regimen: losartan 25mg daily, metoprolol tartrate 25mg Q12, and torsemide 20mg EOD. Currently receiving amlodipine 5mg daily and torsemide 20mg EOD. Maintain SBP <160 due to recent intracranial bleed. Increase amlodipine to 5mg BID and start metoprolol tartrate 12.5mg Q12. Ordered PRN hydralazine. Continue to monitor BP with routine VS.  Follows with Dr. Scott Walters (Cardiology) and Dr. Lily Marquez (Nephrology) as outpatient.

## 2023-09-20 NOTE — ASSESSMENT & PLAN NOTE
Per IM  "[address] on 9/16 showed chronic appearing R caudate lacunar infarct. Currently on Lipitor 40mg daily. Would benefit from starting antiplatelet - would need to discuss with Neurosurgery after follow-up in 2 weeks.   Continue with PT/OT."

## 2023-09-21 ENCOUNTER — APPOINTMENT (INPATIENT)
Dept: CT IMAGING | Facility: HOSPITAL | Age: 83
DRG: 949 | End: 2023-09-21
Payer: MEDICARE

## 2023-09-21 LAB
GLUCOSE SERPL-MCNC: 118 MG/DL (ref 65–140)
GLUCOSE SERPL-MCNC: 130 MG/DL (ref 65–140)

## 2023-09-21 PROCEDURE — 97130 THER IVNTJ EA ADDL 15 MIN: CPT

## 2023-09-21 PROCEDURE — NC001 PR NO CHARGE: Performed by: NEUROLOGICAL SURGERY

## 2023-09-21 PROCEDURE — 82948 REAGENT STRIP/BLOOD GLUCOSE: CPT

## 2023-09-21 PROCEDURE — 97110 THERAPEUTIC EXERCISES: CPT

## 2023-09-21 PROCEDURE — 99232 SBSQ HOSP IP/OBS MODERATE 35: CPT | Performed by: NURSE PRACTITIONER

## 2023-09-21 PROCEDURE — 99223 1ST HOSP IP/OBS HIGH 75: CPT | Performed by: INTERNAL MEDICINE

## 2023-09-21 PROCEDURE — 70450 CT HEAD/BRAIN W/O DYE: CPT

## 2023-09-21 PROCEDURE — 97129 THER IVNTJ 1ST 15 MIN: CPT

## 2023-09-21 PROCEDURE — 99233 SBSQ HOSP IP/OBS HIGH 50: CPT

## 2023-09-21 PROCEDURE — G1004 CDSM NDSC: HCPCS

## 2023-09-21 PROCEDURE — 97535 SELF CARE MNGMENT TRAINING: CPT

## 2023-09-21 RX ORDER — HYDRALAZINE HYDROCHLORIDE 25 MG/1
50 TABLET, FILM COATED ORAL 3 TIMES DAILY
Status: DISCONTINUED | OUTPATIENT
Start: 2023-09-21 | End: 2023-09-22

## 2023-09-21 RX ORDER — HYDRALAZINE HYDROCHLORIDE 25 MG/1
25 TABLET, FILM COATED ORAL ONCE
Status: COMPLETED | OUTPATIENT
Start: 2023-09-21 | End: 2023-09-21

## 2023-09-21 RX ORDER — DOXYCYCLINE HYCLATE 50 MG/1
324 CAPSULE, GELATIN COATED ORAL
Status: DISCONTINUED | OUTPATIENT
Start: 2023-09-21 | End: 2023-09-29 | Stop reason: HOSPADM

## 2023-09-21 RX ORDER — HYDRALAZINE HYDROCHLORIDE 10 MG/1
10 TABLET, FILM COATED ORAL ONCE
Status: COMPLETED | OUTPATIENT
Start: 2023-09-21 | End: 2023-09-21

## 2023-09-21 RX ORDER — POTASSIUM CHLORIDE 20 MEQ/1
20 TABLET, EXTENDED RELEASE ORAL ONCE
Status: COMPLETED | OUTPATIENT
Start: 2023-09-21 | End: 2023-09-21

## 2023-09-21 RX ORDER — FOLIC ACID 1 MG/1
1 TABLET ORAL DAILY
Status: DISCONTINUED | OUTPATIENT
Start: 2023-09-21 | End: 2023-09-29 | Stop reason: HOSPADM

## 2023-09-21 RX ORDER — ACETAMINOPHEN 325 MG/1
650 TABLET ORAL EVERY 6 HOURS PRN
Status: DISCONTINUED | OUTPATIENT
Start: 2023-09-21 | End: 2023-09-29 | Stop reason: HOSPADM

## 2023-09-21 RX ORDER — ASCORBIC ACID 500 MG
500 TABLET ORAL DAILY
Status: DISCONTINUED | OUTPATIENT
Start: 2023-09-21 | End: 2023-09-29 | Stop reason: HOSPADM

## 2023-09-21 RX ADMIN — Medication 250 MG: at 08:15

## 2023-09-21 RX ADMIN — ATORVASTATIN CALCIUM 40 MG: 40 TABLET, FILM COATED ORAL at 21:13

## 2023-09-21 RX ADMIN — HEPARIN SODIUM 5000 UNITS: 5000 INJECTION INTRAVENOUS; SUBCUTANEOUS at 21:13

## 2023-09-21 RX ADMIN — OXYCODONE HYDROCHLORIDE AND ACETAMINOPHEN 500 MG: 500 TABLET ORAL at 09:40

## 2023-09-21 RX ADMIN — TORSEMIDE 20 MG: 20 TABLET ORAL at 08:15

## 2023-09-21 RX ADMIN — HYDRALAZINE HYDROCHLORIDE 50 MG: 25 TABLET, FILM COATED ORAL at 17:09

## 2023-09-21 RX ADMIN — POTASSIUM CHLORIDE 20 MEQ: 1500 TABLET, EXTENDED RELEASE ORAL at 14:07

## 2023-09-21 RX ADMIN — CALCIUM CARBONATE (ANTACID) CHEW TAB 500 MG 500 MG: 500 CHEW TAB at 12:31

## 2023-09-21 RX ADMIN — ACETAMINOPHEN 650 MG: 325 TABLET ORAL at 10:43

## 2023-09-21 RX ADMIN — FLUTICASONE PROPIONATE 1 SPRAY: 50 SPRAY, METERED NASAL at 08:15

## 2023-09-21 RX ADMIN — LORATADINE 10 MG: 10 TABLET ORAL at 08:15

## 2023-09-21 RX ADMIN — FERROUS GLUCONATE 324 MG: 324 TABLET ORAL at 09:40

## 2023-09-21 RX ADMIN — AMLODIPINE BESYLATE 5 MG: 5 TABLET ORAL at 08:16

## 2023-09-21 RX ADMIN — HYDRALAZINE HYDROCHLORIDE 10 MG: 10 TABLET, FILM COATED ORAL at 12:34

## 2023-09-21 RX ADMIN — FOLIC ACID 1 MG: 1 TABLET ORAL at 09:40

## 2023-09-21 RX ADMIN — SODIUM BICARBONATE 650 MG TABLET 1300 MG: at 08:15

## 2023-09-21 RX ADMIN — AMPICILLIN SODIUM AND SULBACTAM SODIUM 3 G: 2; 1 INJECTION, POWDER, FOR SOLUTION INTRAMUSCULAR; INTRAVENOUS at 21:26

## 2023-09-21 RX ADMIN — HYDRALAZINE HYDROCHLORIDE 10 MG: 10 TABLET, FILM COATED ORAL at 14:07

## 2023-09-21 RX ADMIN — SENNOSIDES 8.6 MG: 8.6 TABLET, FILM COATED ORAL at 17:10

## 2023-09-21 RX ADMIN — SENNOSIDES 8.6 MG: 8.6 TABLET, FILM COATED ORAL at 08:15

## 2023-09-21 RX ADMIN — SODIUM BICARBONATE 650 MG TABLET 1300 MG: at 17:09

## 2023-09-21 RX ADMIN — AMLODIPINE BESYLATE 5 MG: 5 TABLET ORAL at 21:13

## 2023-09-21 RX ADMIN — PANTOPRAZOLE SODIUM 40 MG: 40 TABLET, DELAYED RELEASE ORAL at 05:24

## 2023-09-21 RX ADMIN — AMPICILLIN SODIUM AND SULBACTAM SODIUM 3 G: 2; 1 INJECTION, POWDER, FOR SOLUTION INTRAMUSCULAR; INTRAVENOUS at 10:19

## 2023-09-21 RX ADMIN — MAGNESIUM OXIDE TAB 400 MG (241.3 MG ELEMENTAL MG) 400 MG: 400 (241.3 MG) TAB at 08:15

## 2023-09-21 RX ADMIN — HEPARIN SODIUM 5000 UNITS: 5000 INJECTION INTRAVENOUS; SUBCUTANEOUS at 05:24

## 2023-09-21 RX ADMIN — LEVETIRACETAM 500 MG: 500 TABLET, FILM COATED ORAL at 08:15

## 2023-09-21 RX ADMIN — Medication 250 MG: at 17:09

## 2023-09-21 RX ADMIN — HYDRALAZINE HYDROCHLORIDE 25 MG: 25 TABLET, FILM COATED ORAL at 22:51

## 2023-09-21 RX ADMIN — LEVETIRACETAM 500 MG: 500 TABLET, FILM COATED ORAL at 21:14

## 2023-09-21 RX ADMIN — METOPROLOL TARTRATE 12.5 MG: 25 TABLET, FILM COATED ORAL at 08:15

## 2023-09-21 RX ADMIN — METOPROLOL TARTRATE 12.5 MG: 25 TABLET, FILM COATED ORAL at 21:13

## 2023-09-21 RX ADMIN — CITALOPRAM HYDROBROMIDE 20 MG: 20 TABLET ORAL at 08:15

## 2023-09-21 RX ADMIN — MELATONIN TAB 3 MG 3 MG: 3 TAB at 21:14

## 2023-09-21 RX ADMIN — OFLOXACIN 5 DROP: 3 SOLUTION OPHTHALMIC at 08:15

## 2023-09-21 RX ADMIN — HEPARIN SODIUM 5000 UNITS: 5000 INJECTION INTRAVENOUS; SUBCUTANEOUS at 14:07

## 2023-09-21 NOTE — CONSULTS
e-Consult (IPC)     Inpatient consult to Neurosurgery  Consult performed by: Neva Ca PA-C  Consult ordered by: Sudhakar Krishnamurthy MD           Contacted by Alba Chavez. Shelley Stone 80 y.o. male MRN: [id number]  Unit/Bed#: -01 Encounter: [id number]    Reason for Consult    Per provider report, patient initially seen 9/15 after a fall with resultant SAH, R SAH, temporal bone fx with pneumocephalus. He was ultimately discharged to rehab. Per report he has been more lethargic. Available past medical history,social history, surgical history, medication list, drug allergies and review of systems were reviewed. /82 (BP Location: Left arm)   Pulse 60   Temp 97.5 °F (36.4 °C) (Tympanic)   Resp 17   Ht 5' 7" (1.702 m)   Wt 83.9 kg (184 lb 15.5 oz)   SpO2 97%   BMI 28.97 kg/m²      Clinical exam per provider report, neuro intact. Imaging personally reviewed. CT head w/o, 9/21/23: increased size of R SDH. Stable SAH. Assessment and Recommendations    1. Continue to monitor neurological exam  2. Repeat CTH for exam change  3. Continue to hold AC/AP medications  4. Otherwise ok to stay in rehab given lack of brain compression necessitating immediate surgical intervention. Follow up as scheduled in 1 week with repeat CTH; will discuss embo at that time. All questions answered. Provider is in agreement with the course of action. 11-20 minutes, >50% of the total time devoted to medical consultative verbal/EMR discussion between providers. Written report will be generated in the EMR.

## 2023-09-21 NOTE — ASSESSMENT & PLAN NOTE
Hgb currently 10.2. Had been receiving ferrous sulfate as OP. Vitamin B12 1,238 on 9/20. Iron panel completed on 9/20 and showed iron sat 14%, TIBC 215, and iron 30. Ferritin 131. Folate 8. Started on folic acid 1mg daily, ferrous gluconate 324mg daily, and vitamin C 500mg daily on 9/21. Currently asymptomatic. Continue to trend routine CBC.

## 2023-09-21 NOTE — CASE MANAGEMENT
Case Management Update:  Cm met with pt at bedside to introduce role and complete cm open:    Pt lives with spouse Elva Mackay in ranch home, 3 NANO. Prior to admission, pt required some assistance with ADLs at times. Pt reports having cane and walker in home. Pt reports hx of STR at [facility], [facility], no op therapies. PCP is Fazal Oh, preferred pharmacy is [facility] in [facility]. The patient was educated that other members of the patient's support are able to ask questions and observe as the patient wished. The patient was educated on the rehabilitation process including therapy program, the interdisciplinary team, and weekly team meetings. Estimated length of stay was reviewed with the patient as well as expectations of discussions of discharge planning. The role of the  was reviewed including providing care coordination, discharge planning and discharge facilitation. IMM was reviewed with the patient and a copy was provided for their reference. The patient verbalized understanding of the information provided and denied any further questions at this time. CM will continue to follow and assist the patient throughout their rehabilitation stay.

## 2023-09-21 NOTE — PROGRESS NOTES
09/21/23 1230   Therapy Time missed   Time missed?  Yes   Amount of time missed 90   Reason for time missed Medical hold

## 2023-09-21 NOTE — PROGRESS NOTES
23 1000   Pain Assessment   Pain Assessment Tool 0-10   Pain Score No Pain   Restrictions/Precautions   Precautions Bed/chair alarms;Cognitive; Fall Risk;Seizure;Supervision on toilet/commode;Visual deficit   Comprehension   Comprehension (FIM) 4 - Understands basic info/conversation 75-90% of time   Expression   Expression (FIM) 5 - Needs help/cues only RARELY (< 10% of the time)   Social Interaction   Social Interaction (FIM) 6 - Interacts appropriately with others BUT requires extra  time   Problem Solving   Problem solving (FIM) 4 - Solves basic problems 75-89% of time   Memory   Memory (FIM) 4 - Recognizes/recalls/performs 75-89%   Speech/Language/Cognitive Assessment   Speech/Language/Cognitive Assessment Pt participated in skilled SLP session focusing on cognitive linguistic skills. Began session by reviewing recent RIPA- assessment that was completed in initial evaluation in which scores correlate to overall WNL skills at time of assessment. In self reflecting, pt also currently denies any changes in cognition since his fall. Pt reports that his wife manages most IADLs, but that he manages his own medications using a weekly pill box that he fills once a week which he has separate pill boxes for AM, PM, and bedtime meds. He reports that he uses reading glasses to assist with reading pill bottle labels but will use a magnifying glass as well if he is unsure of info due to his macular degeneration. His wife goes to the pharmacy and picks up his meds. When reviewing his current medication list, pt reported that he will likely not be able to recall his previous medication list. When given the name of each medication of his current list, pt continued to appear unsure if certain medications were being taken prior to admission vs newly prescribed since admission, however, some he provided a more confident appearing answer.  Given the names of each medication (including pills, injections, sprays, etc), pt accurately recalled/stated the reason/use for 10/20 medications reviewed but was provided with education on remaining medications. Of note, it is observed that pt will have medications to take at various times of the day. Plan to continue to target medication management in future sessions as at this time, would likely recommend pt to have assistance with med management but will target this area for pt to attempt to manage. Throughout session, pt presenting with slower processing, attention, decreased STM memory and decreased working memory. At this time, pt is recommended for further skilled SLP services targeting overall and higher level cognitive linguistic skills to maximize level of functioning and independence on discharge. SLP Therapy Minutes   SLP Time In 1000   SLP Time Out 1030   SLP Total Time (minutes) 30   SLP Mode of treatment - Individual (minutes) 30   SLP Mode of treatment - Concurrent (minutes) 0   SLP Mode of treatment - Group (minutes) 0   SLP Mode of treatment - Co-treat (minutes) 0   SLP Mode of Treatment - Total time(minutes) 30 minutes   SLP Cumulative Minutes 90   Therapy Time missed   Time missed?  No

## 2023-09-21 NOTE — ASSESSMENT & PLAN NOTE
S/p fall on 9/15. [address] on 9/15 showed multicompartmental hemorrhage with small foci of subarachnoid hemorrhage in the bilateral inferior frontal region and additional small foci of subarachnoid hemorrhage. Repeat CTH on 9/17 stable. Hold AC/AP. Has been restarted on DVT ppx per Neurosurgery. Neurovascular checks Q shift. Maintain SBP <160. Continue Keppra for 7 days for seizure ppx. Follow-up with Neurovascular in 2 weeks with repeat CTH (9/29). Primary team following. PT/OT.

## 2023-09-21 NOTE — ASSESSMENT & PLAN NOTE
Chronic. Uses over the counter nasal sprays and antihistamines. Started on Claritin and Flonase on 9/20. Encouraged to follow-up with ENT as outpatient.

## 2023-09-21 NOTE — TEAM CONFERENCE
Ac[facility] Conference Note  Date: 9/21/2023   Time: 1:05 PM       Patient Name:  Roshan Salas       Medical Record Number: [id number]   YOB: 1940  Sex:  Male          Room/Bed:  [facility] 265/[facility] 265-01  Payor Info:  Payor: Mary Iveth / Plan: MEDICARE A AND B / Product Type: Medicare A & B Fee for Service /      Admitting Diagnosis: Temporal bone fracture ([address]) [S02.19XA]   Admit Date/Time:  9/19/2023  2:37 PM  Admission Comments: No comment available     Primary Diagnosis:  SAH (subarachnoid hemorrhage) ([facility])  Principal Problem: SAH (subarachnoid hemorrhage) ([address])    Patient Active Problem List    Diagnosis Date Noted   • Sinus congestion 09/20/2023   • Insomnia 09/20/2023   • At risk for venous thromboembolism (VTE) 09/20/2023   • History of stroke 09/19/2023   • Abnormal findings on imaging test 09/19/2023   • Atrial flutter ([address]) 09/19/2023   • Leukocytosis 09/19/2023   • GERD (gastroesophageal reflux disease) 09/19/2023   • Fracture of temporal bone ([address]) 09/15/2023   • SDH (subdural hematoma) ([address]) 09/15/2023   • Fall 09/15/2023   • SAH (subarachnoid hemorrhage) ([address]) 09/15/2023   • Pneumocephalus 09/15/2023   • Hyponatremia 09/05/2023   • Pelvic lymphadenopathy 05/16/2023   • Chronic diastolic CHF (congestive heart failure) ([address]) 05/04/2023   • Rib pain on left side 04/11/2023   • Left inguinal pain 04/11/2023   • Chemotherapy-induced thrombocytopenia 02/15/2023   • Shortness of breath 12/14/2022   • Anemia due to stage 4 chronic kidney disease ([address]) 12/07/2022   • Stage 4 chronic kidney disease ([address]) 08/10/2022   • Secondary hyperparathyroidism of renal origin ([address]) 05/19/2022   • 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   • Functional diarrhea 03/25/2022   • Platelets decreased ([address]) 03/25/2022   • Visual hallucinations 11/23/2021   • Chronic kidney disease-mineral and bone disorder 10/24/2021   • Benign hypertension with chronic kidney disease, stage IV ([address]) 07/19/2021   • Persistent proteinuria 07/19/2021   • Anemia 07/19/2021   • RBBB 05/07/2021   • Hypomagnesemia 61/71/3263   • Metabolic acidosis 49/88/6295   • Severe protein-calorie malnutrition ([address]) 02/19/2021   • Right sided Pelvic abscess in male [facility]) 02/18/2021   • Ambulatory dysfunction 02/16/2021   • Frequent falls 02/16/2021   • Anxiety and depression 12/22/2020   • Overweight 12/22/2020   • Fungal dermatitis 12/03/2020   • Forearm mass, left 09/03/2020   • Elevated troponin 07/21/2020   • Liver function study, abnormal 06/25/2020   • Malignant neoplasm of urinary bladder neck ([address]) 03/24/2020   • Positive urinary cytology 11/05/2019   • Coronary artery disease involving native coronary artery of native heart without angina pectoris 09/09/2019   • Ischemic cardiomyopathy 09/09/2019   • History of bladder cancer 07/30/2019   • Closed fracture of upper end of right fibula 03/11/2019   • Malignant neoplasm of overlapping sites of bladder ([address]) 01/10/2019   • 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/2019   • Type 2 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 ([address]) 12/05/2018   • Bladder tumor 11/01/2018   • Overactive bladder 10/10/2018   • 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   • Backache 10/21/2013   • Arteriosclerotic cardiovascular disease 10/11/2012   • DMII (diabetes mellitus, type 2) ([address]) 10/11/2012   • Hyperlipidemia 10/11/2012       Physical Therapy:    Weight Bearing Status: Full Weight Bearing  Transfers: Minimal Assistance  Bed Mobility: Incidental Touching  Amulation Distance (ft): 175 feet  Ambulation: Incidental Touching, Minimal Assistance  Assistive Device for Ambulation: Roller Walker  Number of Stairs: 4  Assistive Device for Stairs: Bilateral [employer]  Stair Assistance: Minimal Assistance  Ramp: Minimal Assistance  Assistive Device for Ramp: Roller Walker  Discharge Recommendations: Home with:  [facility] with[de-identified] Family Support, Home Physical Therapy    Pt 80 yr old male sp fall moving item into his truck, +head strike, +temp bone fx, dx +SDH, SAH, +R ear bleeding. PMH: macular generation, CKD, CAD, BPH with h/o radical csytoprostectomy with illeal conduit (x3 yrs ago), DM, Mi. At baseline pt was generally fully I with use of SPC outdoors PRN, non  due to visual deficits. Has RW and transport chair. Lives with spouse in a multi-level condo, with main floor bed/bathroom, 1+1 no HR NANO from front, pt usually uses 3 NANO from garage L HR, R grab bar. Pt reports being forgetful and really not feeling motivated or not having much energy to do thing recently, C/o severe nausea this AM, +emesis prior to PT eval, reports having no appetite. Pt presents cooperative and somewhat engaging in PT eval, requiring min/mod A for balance due to variable posterior lean. Pt reports he generally falls backward. Extensive directional cues needed due baseline to visual deficits. Use of Rw t/o session for inc balance and safety, pt able to progress amb distance with chair pulled behind. Pt to benefit from skilled PT intervention to maxmize functional balance and safety. Pt demonstrates good rehab potential to reach S/mod I goals with LRAD pending balance and progress for safe DC home with family support with likely home PT to follow in [facility]OS 10-14 days. Occupational Therapy:  Eating: Supervision  Grooming: Minimal Assistance  Bathing: Moderate Assistance  Bathing: Moderate Assistance  Upper Body Dressing: Supervision  Lower Body Dressing: Moderate Assistance  Toileting: Minimal Assistance  Toilet Transfer: Minimal Assistance  Cognition: Exceptions to WNL  Cognition: Decreased Memory, Decreased Attention  Orientation: Person, Place, Time, Situation  Discharge Recommendations: Home with:  [address] [place] [address] with[de-identified] Family Support       Pt [facility] to [facility] s/p fall sustaining temporal bone fx, and SDH/SAH.  Pt comorbidities include HTN, DM, Acute pain, elevated troponin, Aflutter, macular degeneration At baseline pt was IND with ADL and used [facility] for community mobility. Pt has supportive wife who assisted with IADLs at baseline. Pt reports 6+ falls over past year. Patient is highly motivated and good rehab candidate. Pt has good support at baseline and receptive to interventions/suggestions. Impairment: Decreased balance/impaired right reactions       Interventions: balance training, use of AD, fall precautions  Impairment: decreased dynamic reach                                   Interventions: introduce LHAE, flexibility exercises  Impairment: decreased activity tolerance                                Interventions: endurance training, graded task training  Impairment: impaired cognition                                                Interventions: cognitive exercises, divided attention tasks            Speech Therapy:  Mode of Communication: Verbal  Cognition: Within Defined Limits     Orientation: Person, Place, Time, Situation  Discharge Recommendations: Home with:  [facility] with[de-identified] Family Support  Pt completed the RIPA-.  Pt's overall degree of severity is WNL upon completion of the above stated subtests when compared to age matched peers 80 years and older. During informal interview prior to testing, pt oriented x4, with some recall of accident and current injuries sustained. Pt with some mild hearing issues given Right middle ear trauma from injury however did not impact for assessment purposes. Pt has visual deficits at baseline, macular degeneration. Pt does not drive, and wife completes majority of IADL tasks. Pt stated he does complete his own meds by using a magnifying glass to read the print. Recommend follow up with pt to review scores as well as further plan for discharge and discuss at team regarding SLP need to follow for any cognitive tasks required at discharge as pt appears grossly WNL at this time of testing.        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 Patient was evaluated by neurosurgery recommended nonoperative management with seizure prophylaxis and repeat CT head in 2 weeks. Patient evaluated by ENT and also recommended nonoperative management. He did note some hearing loss and recommended follow-up with them which also should include an audiogram.  He was recommended Unasyn by trauma as well as Floxin drops in his right ear. Course notable for new onset A-flutter. Full anticoagulation and antiplatelets have been on hold due to bleed. He may require Eliquis in the future. Patient evaluated by skilled therapies and noted have significant decline in ADLs and mobility and appears appropriate for admission to acute rehab at this time. Insomnia managed with melatonin 3 mg at HS. Sinus congestion managed with Claritin and Flonase. GERD managed with Protonix 40mg daily, and sodium bicarbonate 1300mg BID. Leukocytosis managed with Unasyn due to open fracture, currently on  Keppra for 7 days for seizure precautions. Floxin drops to R ear.  Benign Hypertension with chronic kidney disease, stage IV managed with metoprolol tartrate 25mg Q12, and torsemide 20mg EOD, Amlodipine 5mg BID PRN hydralazine. Anxiety and depression managed with Celexa 20mg daily. Coronary artery disease, Ischemic Cardiomyopathy, Atrial Flutter all managed with Magnesium Oxide tablet 400 mg. Hyperlipidemia managed by Lipitor 40mg daily. Subarachnoid hemorrhage managed with Keppra for 7 days for seizure precautions. Anticoagulation choice is heparin SQ. This week we will monitor vital signs and lab values. We will manage pain so pt can perform optimally at all therapy sessions. We will teach energy conservation to promote independent ADL's. We will teach the importance of turning and repositioning to off load pressure to prevent skin breakdown. We will keep pt safe from falling by hourly rounding and keeping call bell and all needs within reach. Case Management:     Discharge Planning  Living Arrangements: Lives w/ Spouse/significant other  Support Systems: Spouse/significant other, Children  Assistance Needed: TBD  Type of Current Residence: Private residence (Condo)  Current Home Care Services: No  9/21- Cm to assess. Is the patient actively participating in therapies? yes  List any modifications to the treatment plan: None    Barriers Interventions   Temporal bone fx Ear drops, abx   Nauseous Med hold   BP Monitoring   Atrial flutter Cardiology consult   Hx of bladder cancer Monitoring   Mets cancer Monitoring, chemo eventually   Decreased balance Balance training   Dynamic reach [address] training   LE weakness Comp strategies   Stairs First floor set up    Impaired cog- processing, st memory, executive functioning  SLP Services     Vision Comp strategies         Is the patient making expected progress toward goals?  yes  List any update or changes to goals: None    Medical Goals: Patient will be medically stable for discharge to [facility] upon completion of rehab program and Patient will be able to manage medical conditions and comorbid conditions with medications and follow up upon completion of rehab program    Weekly Team Goals:   Rehab Team Goals  Bowel/Bladder Team Goal: Patient will return to premorbid level for bladder/bowel management upon completion of rehab program  Transfer Team Goal: Patient will be independent with transfers with least restrictive device upon completion of rehab program  Locomotion Team Goal: Patient will be independent with locomotion with least restrictive device upon completion of rehab program    Discussion: Pt participating in therapies and making progress. Pt functioning at mod for balance, max for LB dressing, UB dressing supervision. Team recommending reteam, 10-14 LOS pending BP. Min assist for cog. Anticipated Discharge Date:  reteam - 10-14 day LOS  [facility] Team Members Present: The following team members are supervising care for this patient and were present during this Weekly Team Conference.     Physician: Dr. Carmelina Alford MD  : Jed Kearns MSW  Registered Nurse: Mynor Rodrigez, RN  Physical Therapist: Candida Joy, PT  Occupational Therapist: Dante Zamorano, MS, OTR/L  Speech Therapist: Kiko Alfaro, [id number] [facility], [address] [place] [address]

## 2023-09-21 NOTE — PROGRESS NOTES
OT LONG TERM GOALS        09/20/23 1300   Eating Goal   Eating Assist Level Independant   Status Ongoing; Target goal - two weeks   Grooming Goal   Grooming Assist Level Supervision   Task Complete Groom   Environment Stand at [facility]   Status Ongoing; Target goal - two weeks   Intervention Balance Work; Therapeutic Exercise; Tolerance Work   Bathing Goal   Bathing Assist Level Supervision   Task Lower Body Bathing   Environment Seated;Standing; Shower   Status Ongoing; Target goal - two weeks   Intervention Assistive Device; Neuromuscular Education; Therapeutic Exercise;ADL Training   Upper Body Dressing Goal   Upper Body Dressing Assist Level Independant   Task Upper Body;Arms in/out; Over Head   Environment  Seated   Status Ongoing; Target goal - two weeks   Intervention Therapeutic Exercise; Tolerance Work   Lower Body Dressing Goal   Lower Body Dressing Assist Level Supervision   Task Lower Body;Shoe/Slipper;Socks;Pants; Undergarment   Adaptive Equipment Sock Aide;Reacher; [place]   Environment  Seated;Standing   Status Ongoing; Target goal - two weeks   Intervention Balance Work;Tolerance Work; Therapeutic Exercise;Assistive Device   Toileting Goal   Toileting Assist Level Supervision   Task Pants Up;Hygiene;Pants Down   Safety Balance   Status Ongoing; Target goal - two weeks   Intervention ADL Training;Balance Work;Assistive Device   PT Transfer Goal   Transfer Type Sit to [employer]   Environment Level Surface   Status Ongoing; Target goal - two weeks   Toileting Transfer Goal   Toileting Transfer Assist Level Supervision   Assistive Device Roller Walker; Bedside Commode   Status Ongoing; Target goal - two weeks   Intervention Balance Work;ADL Training;Assistive Device   Tub/Shower Transfer Goal   Tub/Shower Assist Level Supervision   Method Shower Stall   Assist Device Seat with Back;Grab Bar;Hand Held Shower   Status Ongoing; Target goal - two weeks Interventions ADL Training;Assistive Device; Neuromuscular Education   Comprehension Goal   Comprehension Assist Level Moderate [place]   Status Ongoing; Target goal - two weeks   Expression Goal   Expression Assist Level Moderate [place]   Function Demand Complex Needs   Status Ongoing; Target goal - two weeks   Social Interaction Goal   Social Interaction Assist Level Moderate [place]   Behaviors Appropriate; Cooperative;Participate   Status Ongoing; Target goal - two weeks   Intervention Stress Management   Problem Solving Goal   Problem Solving Assist Level Moderate [place]   Basic Function Problem Recognition;Routine Solution   Status Ongoing; Target goal - two weeks   Intervention Cognitive Training   Memory Goal   Memory Assist Level Moderate [place]   Short-Term Memory Orientation; Recent Recall   Long-Term Memory Past Events; Biography Information   Status Ongoing; Target goal - two weeks   Intervention Cognitive Training   Community Reintegration Goal   Goal supervision   Status Ongoing; Target - two weeks   Home Management Goal   Goal wife manages at baseline   Additional Goal (other)   Goal Type (other) Pt will complete medicationmanagement with magnifier and increased light at supervision level. Status Ongoing; Target goal - two weeks

## 2023-09-21 NOTE — PROGRESS NOTES
OT EVALUATION   09/20/23 1300   Patient Data   Rehab Impairment Impairment of mobility, safety and Activities of Daily Living (ADLs) due to Brain Dysfunction: 02.22 Traumatic. Closed Injury   Etiologic Diagnosis temporal bone fracture with associated subdural hematoma and subarachnoid hemorrhage   Date of Onset 09/15/23   Home Setup   Type of Home Other  (condo with loft)   Method of Entry Stairs   Number of Stairs 3  (pt states he uses garage which has Left HR and Right grab bar)   Number of Stairs in Home 11  (11 STAIRS UP to loft pt does not have to access)   First Floor Bathroom Shower;Door;Grab Bars   First Floor Setup Available Yes   Home Modifications Necessary?   (pending progress)   Available Equipment [address]; Shower Stool  (grab bars in shower)   Baseline Information   Transportation Family/friends drive  (pt doesnt drive due to vision)   Prior IADL Participation   Money Management   (wife manages at baseline)   Meal Preparation Other  (wife manages at baseline; pt does cold meal prep)   Laundry   (wife manages at baseline)   Home Cleaning   (wife manages at baseline)   Prior Level of Function   Self-Care 3. Independent - Patient completed the activities by him/herself, with or without an assistive device, with no assistance from a helper. Indoor-Mobility (Ambulation) 3. Independent - Patient completed the activities by him/herself, with or without an assistive device, with no assistance from a helper. Stairs 3. Independent - Patient completed the activities by him/herself, with or without an assistive device, with no assistance from a helper. Functional Cognition 3. Independent - Patient completed the activities by him/herself, with or without an assistive device, with no assistance from a helper. Prior Assistance Needed for Driving;Household Chores/Cleaning;Meal Preparation;Money Management; Shopping   Falls in the Last Year   Number of falls in the past 12 months 6   Type of Injury Associated with Fall Major injury  (pt reports 2 falls in past 6 months but 6 falls in past year)   Psychosocial   Psychosocial (WDL) WDL   Patient Behaviors/Mood Cooperative   Restrictions/Precautions   Precautions (S)  Cognitive; Fall Risk;Seizure;Visual deficit  (manual BP's only!)   Pain Assessment   Pain Assessment Tool 0-10   Pain Score No Pain   Oral Hygiene   Type of Assistance Needed Physical assistance   Physical Assistance Level 25% or less   Comment min A for balance support while standing at sink for oral hygiene   Oral Hygiene CARE Score 3   Grooming   Able To Comb/Brush Hair   Limitation Noted In Strength   Findings pt seated to brush hair. Pt fatigued and unabel to complete in stance   Tub/Shower Transfer   Reason Not Assessed Medical   Findings unable to shower due to elevated BP; please complete manual BP only pt with 10 points high systolic with automatic cuff   Shower/Bathe Self   Type of Assistance Needed Physical assistance   Physical Assistance Level 25% or less   Shower/Bathe Self CARE Score 3   Bathing   Assessed Bath Style Sponge Bath   Anticipated D/C Bath Style Shower   Limitations Noted in Balance; Endurance; Safety;Strength   Positioning Seated;Standing   Findings  Pt able to wash 7/10 body parts. Pt attempting to wash b/l feet using cross leg method but unable to maintain position requires therapist assist. Pt requires assist to wash buttock thoroughly. Pt ablet o wash UB without assist   Dressing/Undressing Clothing   Remove UB Clothes Pullover Shirt   Don UB Clothes Pullover Shirt   Type of Assistance Needed Supervision   Physical Assistance Level No physical assistance   Comment seated to don/doff shirt   Upper Body Dressing CARE Score 4   Remove LB Clothes Undergarment;Pants   Don LB Clothes Pants; Undergarment   Type of Assistance Needed Physical assistance   Physical Assistance Level 51%-75%   Comment Pt requires assist to doff pants/undwear off feet.  Pt unable to don underwear/pants over b/l feet. Pt requires balance assist to pull pants up over hips. Pt would benefit from trial of LHAE   Lower Body Dressing CARE Score 2   Limitations Noted In Balance; Endurance; Safety;Strength   Putting On/Taking Off Footwear   Type of Assistance Needed Physical assistance   Physical Assistance Level 26%-50%   Comment Pt able to doff socks but unable to don socks due to fatigue, nausea and weakness of LE's. Putting On/Taking Off Footwear CARE Score 3   Toileting Hygiene   Type of Assistance Needed Physical assistance   Physical Assistance Level 25% or less   Comment Min a for balance support   Toileting Hygiene CARE Score 3   Toilet Transfer   Surface Assessed Raised Toilet   Transfer Technique   (walking into bathroom with RW min a to rise off toilet)   Limitations Noted In Balance; Endurance;LE Strength   Type of Assistance Needed Physical assistance   Physical Assistance Level 25% or less   Toilet Transfer CARE Score 3   Transfer Bed/Chair/Wheelchair   Limitations Noted In Balance; Coordination; Endurance;LE Strength   Adaptive Equipment Roller Walker   Type of Assistance Needed Physical assistance   Physical Assistance Level 26%-50%   Comment Pt attempting to complete transfer without device (baseline fxn) pt requires mod A for balance support.  With RW pt is min A   Chair/Bed-to-Chair Transfer CARE Score 3   Sit to Lying   Type of Assistance Needed Incidental touching   Physical Assistance Level No physical assistance   Sit to Lying CARE Score 4   Lying to Sitting on Side of Bed   Type of Assistance Needed Incidental touching   Physical Assistance Level No physical assistance   Lying to Sitting on Side of Bed CARE Score 4   Sit to Stand   Type of Assistance Needed Physical assistance   Physical Assistance Level 25% or less   Comment min A sit ot stand posterior lean   Sit to Stand CARE Score 3   Walk 10 Feet   Type of Assistance Needed Physical assistance   Physical Assistance Level 26%-50%   Comment Pt attempting to walk with RW (baseline fxn) Mod A. With RW pt is min A with one episode of Right lateral lean   Walk 10 Feet CARE Score 3   Comprehension   Assist Devices Glasses   Auditory Basic   Visual Basic   QI: Comprehension 4. Undestands: Clear comprehension without cues or repetitions   Comprehension (FIM) 5 - Understands basic directions and conversation   Expression   Verbal Basic   Non-Verbal Basic   Intelligibility Sentence   QI: Expression 4. Express complex messages without difficulty and with speech that is clear and easy to [place]   Expression (FIM) 6 - Expresses complex/abstract but requires:  more time   Social Interaction   Cooperation with staff   Participation Individual   Social Interaction (FIM) 6 - Interacts appropriately with others BUT requires extra  time   Problem Solving   Routine Manages call bell   Problem solving (FIM) 5 - Solves basic problems 90% of time   Memory   Recognize People Yes   Remember Routine Yes   Initiates Tasks Yes   Short-Term Impaired   Long Term Intact   Memory (FIM) 5 - Recalls/performs request 90% of time   RUE Assessment   RUE Assessment WFL   LUE Assessment   LUE Assessment WFL   Cognition   Overall Cognitive Status Impaired   Arousal/Participation Alert; Cooperative   Attention Attends with cues to redirect   Orientation Level Oriented X4   Memory Decreased short term memory   Following Commands Follows one step commands with increased time or repetition   Comments Pt reports he has difficulty with memory since fall and at times has difficulty attending. No difficulty with attending noted during session. Vision   Vision Comments [facility] states that with magnifer and headlight improves his vision when completing tasks such as medication management.    Discharge Information   Patient's Discharge Plan return home with wife's support   Barriers to Discharge Home Limited Family Support;Decreased Strength;Decreased Endurance   Impressions Pt is 80 y.o. M who presents s/p fall after moving item into his truck. Pt with (+) head strike. Imaging revaled temporal bone fx, and subdural and subaracnoid hemorrhage. Neurosx recommended non operative management of SDH/SAH. ENT was consulted during hospital stay due to bleeding from Right ear and hearing loss. Pt was given drops for ear and instructed to follow up outpatient. Pt comorbidities include CKD IV, CAD, elevated troponins, Aflutter, acute pain, depression, GERD, HTN. 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 Patient was medically stablized and transferred to [facility] on 9/19. Pt seen today for skilled OT evaluation. At baseline pt was living with wife in multi level [facility]. Pt reports bed/bath/main living area all on one floor with elevated loft which patient does not need to access. Pt reports he primarily uses garage entrance which is [address] with Left HR and Right grab bar. Pt reports at baseline he is IND with ADL wife assists with driving, cooking, laundry and picking up medications Patient reports he manages medications independently with head light and magnifier. He also states that at baseline he has balance issues and tends to fall backward or to the right. Pt reports 4+ falls in past year with 2 falls in past 6 months. Pt with nausea during session and limited appetite but nausea is much less than this AM. Pt engaged in skilled OT evaluation however functioning well below baseline function. Pt required mod  A for bathing, max A for LB dressing, supervision for UB dressing, and mod A for toileting. Pt deficits include decreased dynamic standing balance, poor activity tolerance, LE weakness, poor righting reactions, and nausea. Pt with limited dynamic reach below waist due to increased nausea. Pt completed fxnl mobility with RW at min A level.  At this time pt is not safe to d/c home and would benefit from skilled OT treatment to address above deficits and improve functional ADL performance in order to decrease risk of additonal falls and injury.    OT Therapy Minutes   OT Time In 1300   OT Time Out 1415   OT Total Time (minutes) 75   OT Mode of treatment - Individual (minutes) 75   OT Mode of treatment - Concurrent (minutes) 0   OT Mode of treatment - Group (minutes) 0   OT Mode of treatment - Co-treat (minutes) 0   OT Mode of Treatment - Total time(minutes) 75 minutes   OT Cumulative Minutes 75   Cumulative Minutes   Cumulative therapy minutes 135

## 2023-09-21 NOTE — CONSULTS
Cardiology Consult  09/21/23    Referring Physian: Mikhail Phoenix MD   SLIM    Chief Complain/Reason for Referal:     IMPRESSION/RECOMMENDATIONS/DISCUSSION:  Atrial flutter, new diagnosis 9/16/2023 at time of hospitalization for mechanical fall, subdural/subarachnoid hemorrhage  Uncontrolled hypertension  History of CVA, chronic lacunar infarct on CT head 9/16/2023  CKD  CAD, remote CABG  Mild ischemic cardiomyopathy, ejection fraction 50 to 55%  Dyslipidemia  Diabetes  History of bladder cancer      Atrial flutter remains rate controlled off AV node blockers, okay to remain off for now. Patient currently not on anticoagulation due to recent fall and intracranial hemorrhage. Consider starting direct oral anticoagulation once cleared by neurosurgery  Blood pressure remains uncontrolled. He has been getting as needed hydralazine. We will start hydralazine p.o. around-the-clock. No ACE inhibitor or ARB in light of recent KO and CKD. We will avoid AV node blockers, considering intrinsic rate control while in atrial flutter, to avoid bradycardia. Okay to continue torsemide for now along with amlodipine. No symptoms of angina      ======================================================    HPI:  I am seeing this patient in cardiology consultation for: Atrial flutter    Walker Clemente is a 80 y.o. male with known CAD status post CABG, ischemic cardiomyopathy with mild reduction left ejection fraction who had presented to [address] [place] [address] earlier this month on 9/15/2023 after a mechanical fall while trying to load some things in his car. Due to head trauma he underwent imaging which revealed subarachnoid and subdural hemorrhage. In the setting of his found to be in new onset atrial flutter and was evaluated by Dr. Houston Agudelo on 9/16/2023. Echocardiogram had revealed left and ejection fraction 50% with inferior regional wall motion normalities.   Troponin levels were elevated, thought to be nonischemic myocardial injury secondary to mechanical fall and subarachnoid/subdural hemorrhages as well as atrial flutter. His heart rates were well controlled and he remained off anticoagulation, with recommendations to start Eliquis once cleared from a neurosurgical standpoint. The patient is now at [facility] rehab, and was seen by LUKE White, who recommended cardiology consultation for the patient's persistent/stable atrial flutter. At this time the patient is seen in acute rehabilitation. His heart rates have mostly been in the 60s with elevated blood pressure readings with a peak blood pressure of 220/90 on 9/20/2023. He has been on amlodipine 5 mg daily and metoprolol tartrate 12.5 mg twice daily. He has also been on torsemide 20 mg every other day, and has received hydralazine 10 mg, 3 doses since yesterday.         Past Medical History:   Diagnosis Date    Acute kidney injury ([address])     Anemia Jan. 2022    Arthritis     Hands    Wright esophagus     BPH with obstruction/lower urinary tract symptoms     CAD (coronary artery disease)     Last assessed 09/16/15    Cancer ([address])     bladder    Cataract, acquired     Last assessed 10/10/17    Chronic kidney disease     Coronary artery disease     Diabetes mellitus ([address])     NIDDM    Diabetic neuropathy ([address])     Feet    Enlarged prostate with lower urinary tract symptoms (LUTS)     Last assessed 10/10/17    Erectile dysfunction     GERD (gastroesophageal reflux disease)     Last assessed 10/10/17    Hemoptysis     Last assessed 03/14/16    Hypercholesterolemia     Last assessed 10/10/17    Hypertension     Last assessed 10/10/17    Kidney stone     Macular degeneration     Right eye is particularly affected-peripheral vision intact    Myocardial infarction [facility]) 1998    OAB (overactive bladder)     Testicular hypofunction     Testicular hypogonadism     Last assessed 10/10/17    Tinnitus     Umbilical hernia     Last assessed 06/18/14    Urge incontinence of urine     Wears glasses          Scheduled Meds:  Current Facility-Administered Medications   Medication Dose Route Frequency Provider Last Rate    acetaminophen  650 mg Oral Q6H PRN Mattson Merry, CRNP      amLODIPine  5 mg Oral BID Mattson Merry, CRNP      ampicillin-sulbactam  3 g Intravenous Q12H Bonny Haywood MD Stopped (09/21/23 1109)    ascorbic acid  500 mg Oral Daily Mattson Merry, CRNP      atorvastatin  40 mg Oral HS Bonny Haywood MD      bisacodyl  10 mg Rectal Daily PRN Bonny Haywood MD      calcium carbonate  500 mg Oral Daily PRN Mattson Merry, CRNP      citalopram  20 mg Oral Daily Bonny Haywood MD      ferrous gluconate  324 mg Oral Daily Before Breakfast Mattson Merry, CRNP      fluticasone  1 spray Each Nare Daily Mattson Merry, CRNP      folic acid  1 mg Oral Daily Mattson Merry, CRNP      heparin (porcine)  5,000 Units Subcutaneous [facility] Bonny Haywood MD      hydrALAZINE  10 mg Oral Q8H PRN Mattson Merry, CRNP      levETIRAcetam  500 mg Oral Q12H [address], CRNP      loratadine  10 mg Oral Daily Mattson Merry, CRNP      magnesium Oxide  400 mg Oral Daily Mattson Merry, CRNP      melatonin  3 mg Oral HS Mattson Merry, CRNP      metoprolol tartrate  12.5 mg Oral Q12H [address] Mattson Merry, CRNP      ofloxacin  5 drop Otic Daily Bonny Haywood MD      oxyCODONE  2.5 mg Oral Q8H PRN Bonny Haywood MD      pantoprazole  40 mg Oral Early Morning Bonny Haywood MD      polyethylene glycol  17 g Oral Daily PRN Bonny Haywood MD      polyethylene glycol  17 g Oral Daily Bonny Haywood MD      saccharomyces boulardii  250 mg Oral BID Mattson Merry, CRNP      senna  1 tablet Oral BID Bonny Haywood MD      sodium bicarbonate  1,300 mg Oral BID after meals Bonny Haywood MD      torsemide  20 mg Oral Every Other Day Bonny Haywood MD      trimethobenzamide  200 mg Intramuscular Q6H PRN Mattson Merry, CRNP       Continuous Infusions:   PRN Meds:. acetaminophen    bisacodyl    calcium carbonate    hydrALAZINE    oxyCODONE    polyethylene glycol    trimethobenzamide  Allergies   Allergen Reactions    Fentanyl Hallucinations    Morphine And Related Other (See Comments)     While having an MI patient received morphine and had adverse reaction but doesn't know what happened. I reviewed the Home Medication list in the chart.      Family History   Problem Relation Age of Onset    Cancer Mother         Topical oral abrasian caused cancer    Other Mother         Digestive System Complications    Diabetes Father     Heart disease Father     Hypertension Father     Coronary artery disease Father     Hyperlipidemia Father        Social History     Socioeconomic History    Marital status: /Civil Union     Spouse name: Not on file    Number of children: Not on file    Years of education: Not on file    Highest education level: Not on file   Occupational History    Occupation: Sales position   Tobacco Use    Smoking status: Former     Packs/day: 1.00     Years: 10.00     Total pack years: 10.00     Types: Cigarettes     Quit date: 1972     Years since quittin.7    Smokeless tobacco: Never    Tobacco comments:     Quit at age 28   Vaping Use    Vaping Use: Never used   Substance and Sexual Activity    Alcohol use: Not Currently     Alcohol/week: 2.0 standard drinks of alcohol     Types: 1 Glasses of wine, 1 Cans of beer per week     Comment: Non since 7/15/2020    Drug use: Never    Sexual activity: Not Currently     Partners: Female   Other Topics Concern    Not on file   Social History Narrative    Always uses seatbelt        Caffeine use- Drinks 2 cups of coffee daily     Social Determinants of Health     Financial Resource Strain: Low Risk  (2022)    Overall Financial Resource Strain (CARDIA)     Difficulty of Paying Living Expenses: Not very hard   Food Insecurity: No Food Insecurity (2023)    Hunger Vital Sign     Worried About Running Out of Food in the Last Year: Never true     Ran Out of Food in the Last Year: Never true   Transportation Needs: No Transportation Needs (9/17/2023)    PRAPARE - Transportation     Lack of Transportation (Medical): No     Lack of Transportation (Non-Medical): No   Physical Activity: Not on file   Stress: Not on file   Social Connections: Not on file   Intimate Partner Violence: Not on file   Housing Stability: Low Risk  (9/17/2023)    Housing Stability Vital Sign     Unable to Pay for Housing in the Last Year: No     Number of Places Lived in the Last Year: 1     Unstable Housing in the Last Year: No       Review of Systems - as per HPI, all others reviewed and negative  Vitals:    09/21/23 1232   BP: (!) 175/100   Pulse:    Resp:    Temp:    SpO2:      I/O         09/19 0701 09/20 0700 09/20 0701  09/21 0700 09/21 0701 09/22 0700    P. O. 240 240     Total Intake(mL/kg) 240 (2.9) 240 (2.9)     Urine (mL/kg/hr) 1670 1620 (0.8) 400 (0.8)    Stool  0 0    Total Output 1670 1620 400    Net -1430 -1380 -400           Unmeasured Stool Occurrence  1 x 1 x          Weight (last 2 days)       Date/Time Weight    09/19/23 1514 83.9 (184.97)            GEN: NAD, Alert  HEENT: Mucus membranes moist, pink conjunctivae  EYES: Pupils equal, sclera anicteric  NECK: No JVD/HJR, no carotid bruit  CARDIOVASCULAR: RRR, No murmur, rub, gallops S1,S2, no parasternal heave/thrill  LUNGS: Clear To auscultation bilaterally  ABDOMEN: Soft, nondistended, no hepatic systolic pulsation  EXTREMITIES/VASCULAR: No edema.   PSYCH: Normal Affect by limited examination  NEURO: Grossly intact by limited examination    HEME: No significant bleeding, bruising, petechia by limited examination  SKIN: No significant rashes by limited examination  MSK:  Normal upper extremity and trunk strengths by limited examination          Results from last 7 days   Lab Units 09/20/23  0614 09/15/23  1306   WBC Thousand/uL 11.99* 15.04*   HEMOGLOBIN g/dL 10.2* 10.3* HEMATOCRIT % 31.1* 31.4*   PLATELETS Thousands/uL 228 233   NEUTROS PCT % 85* 83*   MONOS PCT % 8 8   EOS PCT % 1 1     Results from last 7 days   Lab Units 09/20/23  0614 09/19/23  0552 09/18/23  0525   POTASSIUM mmol/L 3.7 3.7 3.9   CHLORIDE mmol/L 104 108 106   CO2 mmol/L 20* 18* 21   BUN mg/dL 30* 36* 40*   CREATININE mg/dL 2.04* 2.29* 2.23*   CALCIUM mg/dL 8.5 7.9* 8.3*     Results from last 7 days   Lab Units 09/20/23  0614 09/19/23  0552 09/18/23  0525 09/17/23  0446 09/15/23  1306   POTASSIUM mmol/L 3.7 3.7 3.9   < > 3.8   CHLORIDE mmol/L 104 108 106   < > 103   CO2 mmol/L 20* 18* 21   < > 16*   BUN mg/dL 30* 36* 40*   < > 57*   CREATININE mg/dL 2.04* 2.29* 2.23*   < > 2.28*   CALCIUM mg/dL 8.5 7.9* 8.3*   < > 8.5   ALK PHOS U/L 74  --   --   --  97   ALT U/L 8  --   --   --  15   AST U/L 10*  --   --   --  17    < > = values in this interval not displayed. No results found for: "TROPONINT"              Results from last 7 days   Lab Units 09/15/23  2128   INR  1.10               I have personally reviewed the EKG, CXR and Telemetry images directly.       Patient Active Problem List    Diagnosis Date Noted    Sinus congestion 09/20/2023    Insomnia 09/20/2023    At risk for venous thromboembolism (VTE) 09/20/2023    History of stroke 09/19/2023    Abnormal findings on imaging test 09/19/2023    Atrial flutter ([address]) 09/19/2023    Leukocytosis 09/19/2023    GERD (gastroesophageal reflux disease) 09/19/2023    Fracture of temporal bone ([address]) 09/15/2023    SDH (subdural hematoma) ([address]) 09/15/2023    Fall 09/15/2023    SAH (subarachnoid hemorrhage) ([address]) 09/15/2023    Pneumocephalus 09/15/2023    Hyponatremia 09/05/2023    Pelvic lymphadenopathy 05/16/2023    Chronic diastolic CHF (congestive heart failure) ([address]) 05/04/2023    Rib pain on left side 04/11/2023    Left inguinal pain 04/11/2023    Chemotherapy-induced thrombocytopenia 02/15/2023    Shortness of breath 12/14/2022    Anemia due to stage 4 chronic kidney disease ([address]) 12/07/2022    Stage 4 chronic kidney disease ([address]) 08/10/2022    Secondary hyperparathyroidism of renal origin ([address]) 05/19/2022    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) 02/18/2021    Ambulatory dysfunction 02/16/2021    Frequent falls 02/16/2021    Anxiety and depression 12/22/2020    Overweight 12/22/2020    Fungal dermatitis 12/03/2020    Forearm mass, left 09/03/2020    Elevated troponin 07/21/2020    Liver function study, abnormal 06/25/2020    Malignant neoplasm of urinary bladder neck ([address]) 03/24/2020    Positive urinary cytology 11/05/2019    Coronary artery disease involving native coronary artery of native heart without angina pectoris 09/09/2019    Ischemic cardiomyopathy 09/09/2019    History of bladder cancer 07/30/2019    Closed fracture of upper end of right fibula 03/11/2019    Malignant neoplasm of overlapping sites of bladder ([address]) 01/10/2019    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/2019    Type 2 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 ([address]) 12/05/2018    Bladder tumor 11/01/2018    Overactive bladder 10/10/2018    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    Backache 10/21/2013    Arteriosclerotic cardiovascular disease 10/11/2012    DMII (diabetes mellitus, type 2) ([address]) 10/11/2012    Hyperlipidemia 10/11/2012       Portions of the record may have been created with voice recognition software.  Occasional wrong word or "sound a like" substitutions may have occurred due to the inherent limitations of voice recognition software. Read the chart carefully and recognize, using context, where substitutions have occurred.

## 2023-09-21 NOTE — ASSESSMENT & PLAN NOTE
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 Underwent radical cystoprostatectomy with ileal conduit at [facility]. Recurrence in 2022 and was started chemotherapy. Currently receiving Avelunab and aranesp for 3 months. Follows with Dr. Yuniel Littlejohn (Hem/Onc) as outpatient - due for appt on 9/26. Ordered PET scan for 9/19 but currently inpatient. CT chest/abd/pelvis in acute setting showed multiple concerns for metastasis. Follow-up with Hem/Onc as outpatient.

## 2023-09-21 NOTE — PLAN OF CARE
Problem: NEUROSENSORY - ADULT  Goal: Achieves stable or improved neurological status  Description: INTERVENTIONS  - Monitor and report changes in neurological status  - Monitor vital signs such as temperature, blood pressure, glucose, and any other labs ordered   - Initiate measures to prevent increased intracranial pressure  - Monitor for seizure activity and implement precautions if appropriate      Outcome: Progressing  Goal: Remains free of injury related to seizures activity  Description: INTERVENTIONS  - Maintain airway, patient safety  and administer oxygen as ordered  - Monitor patient for seizure activity, document and report duration and description of seizure to physician/advanced practitioner  - If seizure occurs,  ensure patient safety during seizure  - Reorient patient post seizure  - Seizure pads on all 4 side rails  - Instruct patient/family to notify RN of any seizure activity including if an aura is experienced  - Instruct patient/family to call for assistance with activity based on nursing assessment  - Administer anti-seizure medications if ordered    Outcome: Progressing     Problem: GASTROINTESTINAL - ADULT  Goal: Maintains or returns to baseline bowel function  Description: INTERVENTIONS:  - Assess bowel function  - Encourage oral fluids to ensure adequate hydration  - Administer IV fluids if ordered to ensure adequate hydration  - Administer ordered medications as needed  - Encourage mobilization and activity  - Consider nutritional services referral to assist patient with adequate nutrition and appropriate food choices  Outcome: Progressing

## 2023-09-21 NOTE — PROGRESS NOTES
[address] [facility]  Progress Note  Name: Elena Sifuentes  MRN: [id number]  Unit/Bed#: -01 I Date of Admission: 9/19/2023   Date of Service: 9/21/2023 I Hospital Day: 2    Assessment/Plan   Insomnia  Assessment & Plan  Difficulty sleeping in the hospital setting. Has taken melatonin in the past.  Started on melatonin 3mg HS on 9/20. Sinus congestion  Assessment & Plan  Chronic. Uses over the counter nasal sprays and antihistamines. Started on Claritin and Flonase on 9/20. Encouraged to follow-up with ENT as outpatient. GERD (gastroesophageal reflux disease)  Assessment & Plan  Continue Protonix 40mg daily as substitute for non-formulary omeprazole. Started on PRN Tums due to complaints of indigestion. Leukocytosis  Assessment & Plan  WBC count currently 11.99 from 15.04. Chronically elevated as outpatient. Currently no active s/s of infection. Currently receiving Unasyn due to open fracture. UA obtained on 9/19 - negative for infection. Continue to trend routine CBC. Atrial flutter [facility])  Assessment & Plan  Noted to be in a-flutter in acute setting. ECHO obtained on 9/16: EF 50-55%, abnormal diastolic inflow due to atrial flutter, L atrium moderately dilated, and R atrium mildly dilated. Monitor routine VS.  Replete electrolytes as needed. Repeat Mg level due to taking magnesium supplements at home and not currently receiving. Mg 1.9 on 9/20. Restarted magnesium 400mg daily on 9/20. Last EKG showed QTc of 526. Repeat EKG on 9/20 showed QTc of 499. Currently not taking anticoagulation - [id number] score of 8. May benefit from anticoagulation - consider starting after follow-up with Neurosurgery. Follows with Dr. Tanvir Soler (Cardiology) as outpatient. Repeat EKG on 9/20 shows that patient is still in atrial flutter. Cardiology consulted today.     Abnormal findings on imaging test  Assessment & Plan  CTA chest/abd/pelvis on 9/15 with multiple concerning lymph nodes/lesions for metastasis including: nodule at the L obturator internus muscle, L sided mesenteric lymph nodes, R external iliac lymph node, R sided retroperitoneal lymph nodes, hypodense structure along with R pelvic sidewall, lesions in the R inferior hepatic lobe, peritoneal soft tissue nodularity, R cardiophrenic lymph node, 2 enlarged retrocrural lymph nodes, and R middle lobe nodule. Follow-up with Hematology/Oncology as outpatient. History of stroke  Assessment & Plan  CTH on 9/16 showed chronic appearing R caudate lacunar infarct. Currently on Lipitor 40mg daily. Would benefit from starting antiplatelet - would need to discuss with Neurosurgery after follow-up in 2 weeks. Continue with PT/OT. Pneumocephalus  Assessment & Plan  Pneumocephalus within the sinus near the area of the fracture seen on CT. Consider obtaining CTA/CTV if concerns for sinus thrombosis. SDH (subdural hematoma) (HCC)  Assessment & Plan  S/p fall on 9/15. [address] on 9/15 showed small foci of subdural hemorrhage in the R insular region and temporal region without significant mass effect. Repeat CTH on 9/17 stable. Currently holding AC/AP. Has been restarted on DVT ppx per Neurosurgery. Neurovascular checks Q shift. Maintain SBP <160. Continue Keppra for 7 days for seizure ppx. Follow-up with Neurosurgery in 2 weeks with repeat CTH (9/29). Primary team following. PT/OT. Fracture of temporal bone [facility])  Assessment & Plan  S/p fall on 9/15. CTH showed hematoma in the L parietal vertex and L occipital region, non-displaced fracture through the R temporal bone, linear non-displaced fracture through the posterior wall of the condyle with middle ear hemorrhage, fracture passes through the hypotympanum, non-displaced fracture through the R occipital bone. ENT consulted in acute setting - follow-up 1 week after discharge for audiogram.  No surgical intervention needed at this time.     Continue Unasyn IV for 7 days total for open fracture (complete on 9/22). Continue Floxin drops to R ear for 10 days total (complete on 9/25). Consider CTA/CTV as needed if concerns for sinus thrombosis given pneumocephalus within the sinus near the area of the fracture. Had not been done inpatient due to concerns of frequent contrast administration with CKD stage 4. Anemia due to stage 4 chronic kidney disease (HCC)  Assessment & Plan  Hgb currently 10.2. Had been receiving ferrous sulfate as OP. Vitamin B12 1,238 on 9/20. Iron panel completed on 9/20 and showed iron sat 14%, TIBC 215, and iron 30. Ferritin 131. Folate 8. Started on folic acid 1mg daily, ferrous gluconate 324mg daily, and vitamin C 500mg daily on 9/21. Currently asymptomatic. Continue to trend routine CBC. Stage 4 chronic kidney disease [facility])  Assessment & Plan  Lab Results   Component Value Date    EGFR 29 09/20/2023    EGFR 25 09/19/2023    EGFR 26 09/18/2023    CREATININE 2.04 (H) 09/20/2023    CREATININE 2.29 (H) 09/19/2023    CREATININE 2.23 (H) 09/18/2023     Creatinine currently 2.04. Baseline creatinine 2.5-2.8. Avoid nephrotoxins as able - losartan currently on hold. Receiving torsemide 20mg EOD. Ensure adequate hydration. Currently being seen by Vascular for possible fistula creation as OP - on hold due to cancer treatment. Continue sodium bicarbonate 1300mg BID. Follows with Nephrology as outpatient - Dr. Liyl Marquez.    Benign hypertension with chronic kidney disease, stage IV [facility])  Assessment & Plan  Home regimen: losartan 25mg daily, metoprolol tartrate 25mg Q12, and torsemide 20mg EOD. Currently receiving amlodipine 5mg daily and torsemide 20mg EOD. Maintain SBP <160 due to recent intracranial bleed. Increased amlodipine to 5mg BID and started metoprolol tartrate 12.5mg Q12 on 9/20. Ordered PRN hydralazine.   Continue to monitor BP with routine VS.  Follows with Dr. Scott Walters (Cardiology) and Dr. Lily Marquez (Nephrology) as outpatient. BPs have been elevated in rehab setting. Cardiology consulted on 9/21. Anxiety and depression  Assessment & Plan  Continue home Celexa 20mg daily. Supportive counseling as needed. Consider Neuropsych consult as needed. Ischemic cardiomyopathy  Assessment & Plan  Experienced a-flutter in acute setting. ECHO on 9/16 showed EF 50-55%, abnormal diastolic inflow due to atrial flutter, L atrium moderately dilated, and R atrium mildly dilated. Continue with metoprolol tartrate 12.5mg Q12. Follows with Dr. Tanvir Soler (Cardiology) as outpatient. Coronary artery disease involving native coronary artery of native heart without angina pectoris  Assessment & Plan  Hx of CABG. Takes metoprolol tartrate 25mg Q12 at home. Restarted metoprolol tartrate 12.5mg Q12 on 9/20. Continue Lipitor 40mg daily. Does not take ASA as outpatient - may benefit due to hx of CAD and possible stroke seen on CT. Recommend discussing AP/AC use at 2 week follow-up with Neurosurgery. Elevated troponins in acute setting - felt to be type II MI in setting of trauma. 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 Underwent radical cystoprostatectomy with ileal conduit at [facility]. Recurrence in 2022 and was started chemotherapy. Currently receiving Avelunab and aranesp for 3 months. Follows with Dr. Juan Diallo (Hem/Onc) as outpatient - due for appt on 9/26. Ordered PET scan for 9/19 but currently inpatient. CT chest/abd/pelvis in acute setting showed multiple concerns for metastasis. Follow-up with Hem/Onc as outpatient. Hyperlipidemia  Assessment & Plan  Continue home Lipitor 40mg daily.     DMII (diabetes mellitus, type 2) [facility])  Assessment & Plan  Lab Results   Component Value Date    HGBA1C 6.3 06/05/2023       Recent Labs     09/20/23  1142 09/20/23  1622 09/20/23  2042 09/21/23  0654   POCGLU 167* 142* 146* 130       Blood Sugar Average: Last 72 hrs:  (P) 144.4   Currently diet controlled at home. A1c 6.3 on 6/5/2023. Dilia Fare at home for renal protection. Continue SSI, QID accuchecks, and cons. carb diet. Change to BID accuchecks and discontinued sliding scale insulin today. * SAH (subarachnoid hemorrhage) ([address])  Assessment & Plan  S/p fall on 9/15. [facility] on 9/15 showed multicompartmental hemorrhage with small foci of subarachnoid hemorrhage in the bilateral inferior frontal region and additional small foci of subarachnoid hemorrhage. Repeat CTH on 9/17 stable. Hold AC/AP. Has been restarted on DVT ppx per Neurosurgery. Neurovascular checks Q shift. Maintain SBP <160. Continue Keppra for 7 days for seizure ppx. Follow-up with Neurovascular in 2 weeks with repeat CTH (9/29). Primary team following. PT/OT. VTE Pharmacologic Prophylaxis:   Pharmacologic: Heparin  Mechanical VTE Prophylaxis in Place: Yes - sequential compression devices. Current Length of Stay: 2 day(s)    Current Patient Status: Inpatient Rehab     Discharge Plan: As per primary team.    Code Status: Level 1 - Full Code    Subjective:   Pt examined while sitting in recliner in room. States he is started to get a R sided HA and continues with muffled hearing to R ear. Currently rates his pain about a 3/10 and states he feels his "heartbeat" in his R ear. Experiencing some light sensitivity and prefers the door closed. Pt agreeable to PRN dose of Tylenol for HA. Denies dizziness, numbness or tingling. No acute change in vision. Also experiencing ringing in ears and states he had followed up for tinnitus in the past but did not have much to report on other than it has been ongoing for a while. Intermittent nausea, but denies at this time. Denies CP or SOB.   States he has been having trouble sleeping in the hospital and started receiving melatonin last night, unsure if it helped or not, maybe "a little" as he continued to have some trouble falling asleep. Appetite continues to be decreased and ongoing chronic cough and rhinorrhea. Pt wishing to rest at this time after therapy. Objective:     Vitals:   Temp (24hrs), Av.5 °F (36.9 °C), Min:98.3 °F (36.8 °C), Max:98.7 °F (37.1 °C)    Temp:  [98.3 °F (36.8 °C)-98.7 °F (37.1 °C)] 98.3 °F (36.8 °C)  HR:  [64-82] 67  Resp:  [16] 16  BP: (118-220)/() 175/100  SpO2:  [97 %] 97 %  Body mass index is 28.97 kg/m². Review of Systems   Constitutional: Positive for appetite change (decreased) and fatigue. Negative for chills and fever. HENT: Positive for postnasal drip (chronic), rhinorrhea (ongoing, worse when he eats) and tinnitus (bilateral ongoing). Negative for trouble swallowing. R ear is muffled   Eyes: Positive for visual disturbance (states he has macular degeneration, but denies new changes in vision). Negative for photophobia. Respiratory: Positive for cough (chronic, intermittently productive). Negative for chest tightness and shortness of breath. Cardiovascular: Negative for chest pain, palpitations and leg swelling. Gastrointestinal: Positive for nausea (intermittent). Negative for abdominal distention, abdominal pain, constipation and vomiting (reported he did have some vomiting a few days ago, but none since). Last BM    Genitourinary: Negative for difficulty urinating. Ileal conduit in place   Musculoskeletal: Negative for arthralgias and back pain. Neurological: Positive for weakness and headaches (R sided, current and rates it 3/10). Negative for dizziness, seizures, syncope, speech difficulty, light-headedness and numbness. Psychiatric/Behavioral: Positive for sleep disturbance (trouble sleeping at night). Negative for behavioral problems. All other systems reviewed and are negative. Input and Output Summary (last 24 hours):        Intake/Output Summary (Last 24 hours) at 2023 1255  Last data filed at 2023 1100  Gross per 24 hour   Intake 120 ml   Output 1600 ml   Net -1480 ml       Physical Exam:     Physical Exam  Vitals and nursing note reviewed. Constitutional:       General: He is not in acute distress. Appearance: Normal appearance. He is not ill-appearing. HENT:      Head: Normocephalic. Raccoon eyes present. Cardiovascular:      Rate and Rhythm: Normal rate. Pulses: Normal pulses. Comments: Atrial flutter on EKG  Pulmonary:      Effort: Pulmonary effort is normal. No respiratory distress. Breath sounds: Normal breath sounds. Abdominal:      General: The ostomy site is clean. Bowel sounds are normal. There is no distension. Palpations: Abdomen is soft. There is no mass. Tenderness: There is no abdominal tenderness. There is no guarding or rebound. Hernia: No hernia is present. Comments: Ileal conduit in place   Musculoskeletal:      Cervical back: Normal range of motion and neck supple. No tenderness. Right lower leg: No edema. Left lower leg: No edema. Skin:     General: Skin is warm and dry. Capillary Refill: Capillary refill takes less than 2 seconds. Neurological:      General: No focal deficit present. Mental Status: He is alert and oriented to person, place, and time. Mental status is at baseline. Cranial Nerves: No cranial nerve deficit. Sensory: No sensory deficit.    Psychiatric:         Mood and Affect: Mood normal.         Behavior: Behavior normal.         Additional Data:     Labs:    Results from last 7 days   Lab Units 09/20/23  0614   WBC Thousand/uL 11.99*   HEMOGLOBIN g/dL 10.2*   HEMATOCRIT % 31.1*   PLATELETS Thousands/uL 228   NEUTROS PCT % 85*   LYMPHS PCT % 5*   MONOS PCT % 8   EOS PCT % 1     Results from last 7 days   Lab Units 09/20/23  0614   SODIUM mmol/L 136   POTASSIUM mmol/L 3.7   CHLORIDE mmol/L 104   CO2 mmol/L 20*   BUN mg/dL 30*   CREATININE mg/dL 2.04*   ANION GAP mmol/L 12   CALCIUM mg/dL 8.5   ALBUMIN g/dL 3.5 TOTAL BILIRUBIN mg/dL 0.54   ALK PHOS U/L 74   ALT U/L 8   AST U/L 10*   GLUCOSE RANDOM mg/dL 142*     Results from last 7 days   Lab Units 09/15/23  2128   INR  1.10     Results from last 7 days   Lab Units 09/21/23  0654 09/20/23  2042 09/20/23  1622 09/20/23  1142 09/20/23  0637 09/19/23  1637 09/19/23  1156 09/19/23  0807 09/18/23  1702 09/18/23  1130 09/18/23  0743 09/17/23  1714   POC GLUCOSE mg/dl 130 146* 142* 167* 137 138 117 109 116 190* 119 121               Labs reviewed    Imaging:    Imaging reviewed    Recent Cultures (last 7 days):           Last 24 Hours Medication List:   Current Facility-Administered Medications   Medication Dose Route Frequency Provider Last Rate   • acetaminophen  650 mg Oral Q6H PRN LUKE Price     • amLODIPine  5 mg Oral BID LUKE Price     • ampicillin-sulbactam  3 g Intravenous Q12H Laurent Mata MD Stopped (09/21/23 1109)   • ascorbic acid  500 mg Oral Daily LUKE Price     • atorvastatin  40 mg Oral HS Laurent Mata MD     • bisacodyl  10 mg Rectal Daily PRN Laurent Mata MD     • calcium carbonate  500 mg Oral Daily PRN LUKE Price     • citalopram  20 mg Oral Daily Laurent Mata MD     • ferrous gluconate  324 mg Oral Daily Before Breakfast LUKE Price     • fluticasone  1 spray Each Nare Daily LUKE Price     • folic acid  1 mg Oral Daily LUKE Price     • heparin (porcine)  5,000 Units Subcutaneous [facility] Laurent Mata MD     • hydrALAZINE  10 mg Oral Q8H PRN LUKE Price     • levETIRAcetam  500 mg Oral Q12H [address] LUKE Gomez     • loratadine  10 mg Oral Daily LUKE Price     • magnesium Oxide  400 mg Oral Daily LUKE Price     • melatonin  3 mg Oral HS LUKE Price     • metoprolol tartrate  12.5 mg Oral Q12H [address] CRNP     • ofloxacin  5 drop Otic Daily Laurent Mata MD     • oxyCODONE  2.5 mg Oral Q8H PRN Laurent Mata MD     • pantoprazole  40 mg Oral Early Morning Felisa Carrillo MD     • polyethylene glycol  17 g Oral Daily PRN Felisa Carrillo MD     • polyethylene glycol  17 g Oral Daily Felisa Carrillo MD     • saccharomyces boulardii  250 mg Oral BID LUKE Forte     • senna  1 tablet Oral BID Felisa Carrillo MD     • sodium bicarbonate  1,300 mg Oral BID after meals Felisa Carrillo MD     • torsemide  20 mg Oral Every Other Day Felisa Carrillo MD     • trimethobenzamide  200 mg Intramuscular Q6H PRN LUKE Forte          M*Modal software was used to dictate this note. It may contain errors with dictating incorrect words or incorrect spelling. Please contact the provider directly with any questions.

## 2023-09-21 NOTE — PCC OCCUPATIONAL THERAPY
9/27/2023  Pt continues to demonstrate improvement towards OT goals of SUP/Kristan. OT FT completed with pt's wife David Kohler) on 9/26. Wife agreeable to provide SUP for ADL of bathing/toileting upon D/C. Wife can also assist with managing teds, if recommended, upon D/C. Pt's wife also completes majority of IADLs (meal prep/laundry). DME needs include a standard BSC. Pt has a tub bench and RW for D/C. Pt continues to be limited by dec standing balance, dec activity tolerance, and dec sctrength, all in which impact pt's fxnl performance with ADLs. OT plan to continue addressing above noted barriers in order to progress towards OT goals and dec caregiver burden upon D/C. Anticipated plan to schedule D/C date in upcoming days with recommendation for Home OT services. 9/21/2023  Pt [facility] s/p fall sustaining temporal bone fx, and SDH/SAH. Pt comorbidities include HTN, DM, Acute pain, elevated troponin, Aflutter, macular degeneration At baseline pt was IND with ADL and used [facility] for community mobility. Pt has supportive wife who assisted with IADLs at baseline. Pt reports 6+ falls over past year. Patient is highly motivated and good rehab candidate. Pt has good support at baseline and receptive to interventions/suggestions.       Impairment: Decreased balance/impaired right reactions       Interventions: balance training, use of AD, fall precautions  Impairment: decreased dynamic reach                                   Interventions: introduce LHAE, flexibility exercises  Impairment: decreased activity tolerance                                Interventions: endurance training, graded task training  Impairment: impaired cognition                                                Interventions: cognitive exercises, divided attention tasks

## 2023-09-21 NOTE — ASSESSMENT & PLAN NOTE
Hx of CABG. Takes metoprolol tartrate 25mg Q12 at home. Restarted metoprolol tartrate 12.5mg Q12 on 9/20. Continue Lipitor 40mg daily. Does not take ASA as outpatient - may benefit due to hx of CAD and possible stroke seen on CT. Recommend discussing AP/AC use at 2 week follow-up with Neurosurgery. Elevated troponins in acute setting - felt to be type II MI in setting of trauma.

## 2023-09-21 NOTE — ASSESSMENT & PLAN NOTE
Noted to be in a-flutter in acute setting. ECHO obtained on 9/16: EF 50-55%, abnormal diastolic inflow due to atrial flutter, L atrium moderately dilated, and R atrium mildly dilated. Monitor routine VS.  Replete electrolytes as needed. Repeat Mg level due to taking magnesium supplements at home and not currently receiving. Mg 1.9 on 9/20. Restarted magnesium 400mg daily on 9/20. Last EKG showed QTc of 526. Repeat EKG on 9/20 showed QTc of 499. Currently not taking anticoagulation - [id number] score of 8. May benefit from anticoagulation - consider starting after follow-up with Neurosurgery. Follows with Dr. Yannick Rivas (Cardiology) as outpatient. Repeat EKG on 9/20 shows that patient is still in atrial flutter. Cardiology consulted today.

## 2023-09-21 NOTE — PROGRESS NOTES
09/21/23 0830   Pain Assessment   Pain Assessment Tool 0-10   Pain Score No Pain   Restrictions/Precautions   Precautions Cognitive; Fall Risk;Seizure;Visual deficit  (manual BP's only)   Weight Bearing Restrictions No   ROM Restrictions No   Lifestyle   Autonomy Pt agreeable to OT Tx session. Oral Hygiene   Type of Assistance Needed Incidental touching   Physical Assistance Level No physical assistance   Comment CG in stance at sink top to perform oral hygiene routine. Oral Hygiene CARE Score 4   Grooming   Able To Initiate Tasks;Comb/Brush Hair   Limitation Noted In Strength   Findings S/U while seated at sink for EC following ADL routine   Shower/Bathe Self   Type of Assistance Needed Physical assistance   Physical Assistance Level 25% or less   Comment Performed SB ADL seated at sink with pt dmeonstrating ability to bathe 8/10 parts. Assist to bathe B/L feet. CGA in stance at sink top as pt bathes farzana and buttocks area. Pt preference to SB today, reporting he completes a combination of showering and SB PTA. Shower/Bathe Self CARE Score 3   Bathing   Assessed Bath Style Sponge Bath   Anticipated D/C Bath Style Shower;Sponge Bath   Able to KlickThru No   Able to Wash/Rinse/Dry (body part) Left Arm;Right Arm;L Upper Leg;R Upper Leg;Chest;Abdomen;Perineal Area; Buttocks   Limitations Noted in Balance; Endurance;ROM;Strength;Timeliness;Vision   Positioning Seated;Standing   Tub/Shower Transfer   Reason Not Assessed Sponge Bath;Patient refusal   Findings Plan to complete shower during next ADL with OT, if appropriate.    Upper Body Dressing   Type of Assistance Needed Supervision   Physical Assistance Level No physical assistance   Comment Seated to doff/don [place] shirt   Upper Body Dressing CARE Score 4   Lower Body Dressing   Type of Assistance Needed Physical assistance   Physical Assistance Level 26%-50%   Comment Seated performing fxnl reach to thread LE's into under garment and hospital pants, req A to fully manage LLE. CGA in stance at sink top for CM with no LOB   Lower Body Dressing CARE Score 3   Putting On/Taking Off Footwear   Type of Assistance Needed Physical assistance   Physical Assistance Level 26%-50%   Comment Fxnl reach to doff socks while seated; Combination of fxnl reach and xleg technique to don RLE sock, assist to manage L sock   Putting On/Taking Off Footwear CARE Score 3   Sit to Stand   Type of Assistance Needed Physical assistance; Adaptive equipment   Physical Assistance Level 25% or less   Comment RW; G hand placement   Sit to Stand CARE Score 3   Bed-Chair Transfer   Type of Assistance Needed Physical assistance; Adaptive equipment   Physical Assistance Level 25% or less   Comment Rani with RW and for short distance fnxl mobility recliner <> BR   Chair/Bed-to-Chair Transfer CARE Score 3   Toileting Hygiene   Type of Assistance Needed Physical assistance; Adaptive equipment   Physical Assistance Level 25% or less   Comment Rani for steadying while in stance at [facility] for CM and rear hygiene   Toileting Hygiene CARE Score 3   Toilet Transfer   Type of Assistance Needed Physical assistance; Adaptive equipment   Physical Assistance Level 25% or less   Comment Rani with RW to raised toilet seat   Toilet Transfer CARE Score 3   Therapeutic Excerise-Strength   UE Strength Yes   Right Upper Extremity- Strength   R Shoulder Flexion;ABduction; External rotation; Internal rotation   R Elbow Elbow flexion;Elbow extension   R Position Seated   Equipment Dumbbell  (2#)   R Weight/Reps/Sets 2 sets of 15 reps   RUE Strength Comment Engaged ins eated UE TE utilizing 2# dumbbell to perform 2 sets of 15 reps of indicated planes with focus on improving UE strength for fxnl transfer's and ADL performance. Pt tolerated well with rest breaks between each set. Left Upper Extremity-Strength   LUE Strength Comment See above   Cognition   Overall Cognitive Status Impaired   Arousal/Participation Alert; Cooperative Attention Attends with cues to redirect   Orientation Level Oriented X4   Memory Decreased short term memory   Following Commands Follows one step commands with increased time or repetition   Activity Tolerance   Activity Tolerance Patient tolerated treatment well   Assessment   Treatment Assessment Pt participated in 90 min skilled OT Tx session with focus on ADL performance, fxnl transfer's, UE TE, and improving overall activity tolerance. See above for further Tx details. Pt preference to complete SB this session, reporting he performs a combination of SB'ing and showering PTA. Pt overall req Rani for ADLs bathing, dressing,a nd toileting, and Rani with RW for fxnl transfer's. BP monitored throughout session (140/78 seated; 118/60 standing; 150/68 following ADL routine) with no C/O of feeling dizzy or lightheaded. Reported to RN. Some fatIgue noted following ADL. Pt continues to be limited by dec activity tolerance, dec strength, dec standing balance, and dec ROM, all in which impact pt's fxnl performance with ADLs. Pt would continue to benefit from skilled oT services to continue addressing above noted barriers and dec caregiver burden upon D/C. Prognosis Good   Problem List Decreased strength;Decreased range of motion;Decreased endurance; Impaired balance;Decreased mobility; Decreased cognition; Impaired vision   Barriers to Discharge Decreased caregiver support; Inaccessible home environment   Plan   Treatment/Interventions ADL retraining;Functional transfer training; Therapeutic exercise; Endurance training   Progress Progressing toward goals   Recommendation   OT Discharge Recommendation   (Pending progress)   Equipment Recommended   (TBD)   OT Therapy Minutes   OT Time In 0830   OT Time Out 1000   OT Total Time (minutes) 90   OT Mode of treatment - Individual (minutes) 90   OT Mode of treatment - Concurrent (minutes) 0   OT Mode of treatment - Group (minutes) 0   OT Mode of treatment - Co-treat (minutes) 0 OT Mode of Treatment - Total time(minutes) 90 minutes   OT Cumulative Minutes 165   Therapy Time missed   Time missed?  No

## 2023-09-21 NOTE — ASSESSMENT & PLAN NOTE
Lab Results   Component Value Date    HGBA1C 6.3 06/05/2023       Recent Labs     09/20/23  1142 09/20/23  1622 09/20/23 2042 09/21/23  0654   POCGLU 167* 142* 146* 130       Blood Sugar Average: Last 72 hrs:  (P) 144.4   Currently diet controlled at home. A1c 6.3 on 6/5/2023. Opal Flirt at home for renal protection. Continue SSI, QID accuchecks, and cons. carb diet. Change to BID accuchecks and discontinued sliding scale insulin today.

## 2023-09-21 NOTE — ASSESSMENT & PLAN NOTE
Experienced a-flutter in acute setting. ECHO on 9/16 showed EF 50-55%, abnormal diastolic inflow due to atrial flutter, L atrium moderately dilated, and R atrium mildly dilated. Continue with metoprolol tartrate 12.5mg Q12. Follows with Dr. Alex Wray (Cardiology) as outpatient.

## 2023-09-21 NOTE — ASSESSMENT & PLAN NOTE
Difficulty sleeping in the hospital setting. Has taken melatonin in the past.  Started on melatonin 3mg HS on 9/20.

## 2023-09-21 NOTE — PLAN OF CARE
Problem: SAFETY ADULT  Goal: Maintain or return to baseline ADL function  Description: INTERVENTIONS:  -  Assess patient's ability to carry out ADLs; assess patient's baseline for ADL function and identify physical deficits which impact ability to perform ADLs (bathing, care of mouth/teeth, toileting, grooming, dressing, etc.)  - Assess/evaluate cause of self-care deficits   - Assess range of motion  - Assess patient's mobility; develop plan if impaired  - Assess patient's need for assistive devices and provide as appropriate  - Encourage maximum independence but intervene and supervise when necessary  - Involve family in performance of ADLs  - Assess for home care needs following discharge   - Consider OT consult to assist with ADL evaluation and planning for discharge  - Provide patient education as appropriate  Outcome: Progressing  Goal: Maintains/Returns to pre admission functional level  Description: INTERVENTIONS:  - Perform BMAT or MOVE assessment daily.   - Set and communicate daily mobility goal to care team and patient/family/caregiver. - Collaborate with rehabilitation services on mobility goals if consulted  - Perform Range of Motion 3 times a day. - Reposition patient every 2 hours.   - Dangle patient 4 times a day  - Stand patient 4 times a day  - Ambulate patient 4 times a day  - Out of bed to chair 2 times a day   - Out of bed for meals 3 times a day  - Out of bed for toileting  - Record patient progress and toleration of activity level   Outcome: Progressing

## 2023-09-21 NOTE — ASSESSMENT & PLAN NOTE
Lab Results   Component Value Date    EGFR 29 09/20/2023    EGFR 25 09/19/2023    EGFR 26 09/18/2023    CREATININE 2.04 (H) 09/20/2023    CREATININE 2.29 (H) 09/19/2023    CREATININE 2.23 (H) 09/18/2023     Creatinine currently 2.04. Baseline creatinine 2.5-2.8. Avoid nephrotoxins as able - losartan currently on hold. Receiving torsemide 20mg EOD. Ensure adequate hydration. Currently being seen by Vascular for possible fistula creation as OP - on hold due to cancer treatment. Continue sodium bicarbonate 1300mg BID.   Follows with Nephrology as outpatient - Dr. Haylee Woodson.

## 2023-09-21 NOTE — ASSESSMENT & PLAN NOTE
[facility] on 9/16 showed chronic appearing R caudate lacunar infarct. Currently on Lipitor 40mg daily. Would benefit from starting antiplatelet - would need to discuss with Neurosurgery after follow-up in 2 weeks. Continue with PT/OT.

## 2023-09-21 NOTE — ASSESSMENT & PLAN NOTE
Home regimen: losartan 25mg daily, metoprolol tartrate 25mg Q12, and torsemide 20mg EOD. Currently receiving amlodipine 5mg daily and torsemide 20mg EOD. Maintain SBP <160 due to recent intracranial bleed. Increased amlodipine to 5mg BID and started metoprolol tartrate 12.5mg Q12 on 9/20. Ordered PRN hydralazine. Continue to monitor BP with routine VS.  Follows with Dr. Stella Rivera (Cardiology) and Dr. Elkin Galindo (Nephrology) as outpatient. BPs have been elevated in rehab setting. Cardiology consulted on 9/21.

## 2023-09-21 NOTE — PCC NURSING
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 Patient was evaluated by neurosurgery recommended nonoperative management with seizure prophylaxis and repeat CT head in 2 weeks. Patient evaluated by ENT and also recommended nonoperative management. He did note some hearing loss and recommended follow-up with them which also should include an audiogram.  He was recommended Unasyn by trauma as well as Floxin drops in his right ear. Course notable for new onset A-flutter. Full anticoagulation and antiplatelets have been on hold due to bleed. He may require Eliquis in the future. Patient evaluated by skilled therapies and noted have significant decline in ADLs and mobility and appears appropriate for admission to acute rehab at this time. Insomnia managed with melatonin 3 mg at HS. Sinus congestion managed with Claritin and Flonase. GERD managed with Protonix 40mg daily, and sodium bicarbonate 1300mg BID. Leukocytosis managed with Unasyn due to open fracture, currently on  Keppra for 7 days for seizure precautions. Floxin drops to R ear. Benign Hypertension with chronic kidney disease, stage IV managed with metoprolol tartrate 25mg Q12, and torsemide 20mg EOD, Amlodipine 5mg BID PRN hydralazine. Anxiety and depression managed with Celexa 20mg daily. Coronary artery disease, Ischemic Cardiomyopathy, Atrial Flutter all managed with Magnesium Oxide tablet 400 mg. Hyperlipidemia managed by Lipitor 40mg daily. Subarachnoid hemorrhage managed with Keppra for 7 days for seizure precautions. Anticoagulation choice is heparin SQ. This week we will monitor vital signs and lab values. We will manage pain so pt can perform optimally at all therapy sessions. We will teach energy conservation to promote independent ADL's. We will teach the importance of turning and repositioning to off load pressure to prevent skin breakdown. We will keep pt safe from falling by hourly rounding and keeping call bell and all needs within reach.

## 2023-09-22 PROBLEM — R51.9 ACUTE HEADACHE: Status: ACTIVE | Noted: 2023-09-22

## 2023-09-22 PROBLEM — R06.83 SNORING: Status: ACTIVE | Noted: 2023-09-22

## 2023-09-22 LAB
GLUCOSE SERPL-MCNC: 115 MG/DL (ref 65–140)
GLUCOSE SERPL-MCNC: 125 MG/DL (ref 65–140)

## 2023-09-22 PROCEDURE — 82948 REAGENT STRIP/BLOOD GLUCOSE: CPT

## 2023-09-22 PROCEDURE — 97116 GAIT TRAINING THERAPY: CPT

## 2023-09-22 PROCEDURE — 99232 SBSQ HOSP IP/OBS MODERATE 35: CPT | Performed by: STUDENT IN AN ORGANIZED HEALTH CARE EDUCATION/TRAINING PROGRAM

## 2023-09-22 PROCEDURE — 99232 SBSQ HOSP IP/OBS MODERATE 35: CPT

## 2023-09-22 PROCEDURE — 97530 THERAPEUTIC ACTIVITIES: CPT

## 2023-09-22 PROCEDURE — 97535 SELF CARE MNGMENT TRAINING: CPT

## 2023-09-22 PROCEDURE — 99232 SBSQ HOSP IP/OBS MODERATE 35: CPT | Performed by: NURSE PRACTITIONER

## 2023-09-22 PROCEDURE — 97129 THER IVNTJ 1ST 15 MIN: CPT

## 2023-09-22 PROCEDURE — 97112 NEUROMUSCULAR REEDUCATION: CPT

## 2023-09-22 PROCEDURE — 97110 THERAPEUTIC EXERCISES: CPT

## 2023-09-22 PROCEDURE — 97130 THER IVNTJ EA ADDL 15 MIN: CPT

## 2023-09-22 RX ORDER — HYDRALAZINE HYDROCHLORIDE 25 MG/1
25 TABLET, FILM COATED ORAL 3 TIMES DAILY
Status: DISCONTINUED | OUTPATIENT
Start: 2023-09-22 | End: 2023-09-22

## 2023-09-22 RX ORDER — HYDRALAZINE HYDROCHLORIDE 10 MG/1
10 TABLET, FILM COATED ORAL 3 TIMES DAILY
Status: DISCONTINUED | OUTPATIENT
Start: 2023-09-22 | End: 2023-09-29 | Stop reason: HOSPADM

## 2023-09-22 RX ORDER — LANOLIN ALCOHOL/MO/W.PET/CERES
3 CREAM (GRAM) TOPICAL ONCE
Status: COMPLETED | OUTPATIENT
Start: 2023-09-22 | End: 2023-09-22

## 2023-09-22 RX ADMIN — OFLOXACIN 5 DROP: 3 SOLUTION OPHTHALMIC at 08:46

## 2023-09-22 RX ADMIN — SODIUM BICARBONATE 650 MG TABLET 1300 MG: at 08:46

## 2023-09-22 RX ADMIN — AMLODIPINE BESYLATE 5 MG: 5 TABLET ORAL at 08:47

## 2023-09-22 RX ADMIN — Medication 250 MG: at 17:34

## 2023-09-22 RX ADMIN — SODIUM BICARBONATE 650 MG TABLET 1300 MG: at 17:34

## 2023-09-22 RX ADMIN — SENNOSIDES 8.6 MG: 8.6 TABLET, FILM COATED ORAL at 17:34

## 2023-09-22 RX ADMIN — ATORVASTATIN CALCIUM 40 MG: 40 TABLET, FILM COATED ORAL at 21:59

## 2023-09-22 RX ADMIN — PANTOPRAZOLE SODIUM 40 MG: 40 TABLET, DELAYED RELEASE ORAL at 05:15

## 2023-09-22 RX ADMIN — POLYETHYLENE GLYCOL 3350 17 G: 17 POWDER, FOR SOLUTION ORAL at 08:46

## 2023-09-22 RX ADMIN — LORATADINE 10 MG: 10 TABLET ORAL at 08:47

## 2023-09-22 RX ADMIN — AMPICILLIN SODIUM AND SULBACTAM SODIUM 3 G: 2; 1 INJECTION, POWDER, FOR SOLUTION INTRAMUSCULAR; INTRAVENOUS at 10:06

## 2023-09-22 RX ADMIN — SENNOSIDES 8.6 MG: 8.6 TABLET, FILM COATED ORAL at 08:47

## 2023-09-22 RX ADMIN — MAGNESIUM OXIDE TAB 400 MG (241.3 MG ELEMENTAL MG) 400 MG: 400 (241.3 MG) TAB at 08:47

## 2023-09-22 RX ADMIN — FERROUS GLUCONATE 324 MG: 324 TABLET ORAL at 06:25

## 2023-09-22 RX ADMIN — MELATONIN TAB 3 MG 3 MG: 3 TAB at 21:59

## 2023-09-22 RX ADMIN — MELATONIN TAB 3 MG 3 MG: 3 TAB at 23:07

## 2023-09-22 RX ADMIN — ACETAMINOPHEN 650 MG: 325 TABLET ORAL at 01:35

## 2023-09-22 RX ADMIN — CITALOPRAM HYDROBROMIDE 20 MG: 20 TABLET ORAL at 08:47

## 2023-09-22 RX ADMIN — OXYCODONE HYDROCHLORIDE AND ACETAMINOPHEN 500 MG: 500 TABLET ORAL at 08:47

## 2023-09-22 RX ADMIN — HEPARIN SODIUM 5000 UNITS: 5000 INJECTION INTRAVENOUS; SUBCUTANEOUS at 05:15

## 2023-09-22 RX ADMIN — FLUTICASONE PROPIONATE 1 SPRAY: 50 SPRAY, METERED NASAL at 08:46

## 2023-09-22 RX ADMIN — HYDRALAZINE HYDROCHLORIDE 50 MG: 25 TABLET, FILM COATED ORAL at 08:47

## 2023-09-22 RX ADMIN — AMPICILLIN SODIUM AND SULBACTAM SODIUM 3 G: 2; 1 INJECTION, POWDER, FOR SOLUTION INTRAMUSCULAR; INTRAVENOUS at 21:58

## 2023-09-22 RX ADMIN — HEPARIN SODIUM 5000 UNITS: 5000 INJECTION INTRAVENOUS; SUBCUTANEOUS at 21:58

## 2023-09-22 RX ADMIN — Medication 250 MG: at 08:47

## 2023-09-22 RX ADMIN — FOLIC ACID 1 MG: 1 TABLET ORAL at 08:47

## 2023-09-22 RX ADMIN — HYDRALAZINE HYDROCHLORIDE 10 MG: 10 TABLET, FILM COATED ORAL at 17:34

## 2023-09-22 RX ADMIN — HEPARIN SODIUM 5000 UNITS: 5000 INJECTION INTRAVENOUS; SUBCUTANEOUS at 14:41

## 2023-09-22 RX ADMIN — ACETAMINOPHEN 650 MG: 325 TABLET ORAL at 23:05

## 2023-09-22 NOTE — ASSESSMENT & PLAN NOTE
Lab Results   Component Value Date    HGBA1C 6.3 06/05/2023       Recent Labs     09/20/23  2042 09/21/23  0654 09/21/23  1634 09/22/23  0626   POCGLU 146* 130 118 115       Blood Sugar Average: Last 72 hrs:  (P) [id number]   Currently diet controlled at home. A1c 6.3 on 6/5/2023. Opal Flirt at home for renal protection. Continue SSI, QID accuchecks, and cons. carb diet. Change to BID accuchecks and discontinued sliding scale insulin today.

## 2023-09-22 NOTE — ASSESSMENT & PLAN NOTE
Hx of CABG. Takes metoprolol tartrate 25mg Q12 at home. Restarted metoprolol tartrate 12.5mg Q12 on 9/20 and d/c on 9/22. Continue Lipitor 40mg daily. Does not take ASA as outpatient - may benefit due to hx of CAD and possible stroke seen on CT. Recommend discussing AP/AC use at 2 week follow-up with Neurosurgery. Elevated troponins in acute setting - felt to be type II MI in setting of trauma.

## 2023-09-22 NOTE — ASSESSMENT & PLAN NOTE
Began with R sided HA on 9/21. Continued hypertension on 9/21 and questionable emesis. STAT CTH ordered on 9/21 and neurosurgery consulted. [facility] on 9/21 showed interval increasing CSF density right subdural hygoma with increasing mass effect on the sulci. Trace midline shift to the left. Stable hyperdense hemorrhages seen in the right subarachnoid/subdural along the right frontal and right temporal regions. No new area of acute hemorrhage. Stable left frontal interior contusions. Stable ventricular size and stable volume of the hyperdense layering blood products in the occipital horns. Neurosurgery recommending f/u CTH in 1 week. Discuss embo at that time. Monitor for neuro symptoms.

## 2023-09-22 NOTE — PROGRESS NOTES
Physical Medicine and Rehabilitation Progress Note  Gerarda Runner 80 y.o. male MRN: [id number]  Unit/Bed#: [facility] 265-01 Encounter: [id number]      Assessment & Plan:     Decline in ADLs and mobility: Functional assessment        FIM  Care Score  Admit Score Recent Score    Total assist  1-100% or 2p    Tot     Max assist 2-51-75%    Sub LBD    Mod assist 3- 26-50%  Par  LBD   Min assist 3- 25% or < Par To hygiene, bathing To hygiene, bathing   CG assist 4  TA     Sup/Setup 4-5 Sup UBD    Mod-I/Indep 6 MI      Transfers  Min-mod assist  Min assist     Ambulation   150 ft mod assist      Stairs   4 steps min assist       Goal: Supervision for most ADLs and for mobility  Major barriers:  Impaired cog, balance, risk of complications   Dispo: Home with [facility] 15 days from admission      SDH (subdural hematoma) (HCC)  Assessment & Plan  9/21 - BP elevated significantly at times, some increased fatigue, headache   - 9/21 - CTH - Interval increasing CSF density right subdural hygroma with increasing mass effect on the sulci. Trace midline shift to the left. Stable hyperdense hemorrhages seen in the right subarachnoid/subdural along the right frontal and right temporal regions. No new area of acute hemorrhage demonstrated. stable left frontal inferior contusions.  Stable ventricular size and stable volume of the hyperdense layering blood products in the occipital horns.    - Discussed with NSx with tele-c/s - stable to remain in ARC setting with plan to repeat CTH in 1 week 9/29 and at that time discuss embolization   -  monitor neuro exam closely and if decline repeat CTH in interim   - Optimal BP mgmt per IM  - Fall while carrying heavy object into truck causing head trauma and multicompartmental hemorrhage with small foci of subarachnoid hemorrhage in the bilateral inferior frontal region and additional small foci of subarachnoid hemorrhage as well as R significant temporal bone fracture and small scalp hematoma in right occipito-temporal region.  - Per NSx - CTA/CTV on hold due to low GFR and CKD 4. Consider in the outpatient setting if there is concern for sinus thrombosis given pneumocephalus within the sinus near the area of fracture. - Residual impairments: Imbalance, incoordination, cog deficits (assess for other impairments during ARC course)   - Cleared for heparin 5000 U SQ TID for VTE prophylaxis   - Repeat Head CT Fri 9/29 and follow-up with NSx after  - Keppra seizure prophylaxis thru 9/21     - Current/recent mood/affect/behavior/cog - flattened somewhat more tired   - Remains at risk for increased confusion/delirium, restlessness, agitation, and fall - continue to monitor for concerns/changes  - Current psychotropics and potentially sedating medications:   • Citalopram  • Keppra stopping 9/21  • Melatonin 3mg HS  • PRN Oxy 2.5mg   - Monitor for need for 1:1 (Notify MD of potentially dangerous behavior such as significant impulsivity and trying to stand/get out of bed/chair unattended that could lead to fall/injury); High fall precautions with frequent rounding, patient close to nursing station if possible, frequent toileting/frequent offering urinal/bedpan (only if too immobile for safe toileting);  bed/chair alarm on at all times (high setting if needed); fall lj on side of bed (at discretion of nursing/therapy); do not leave unattended in bathroom, patient education; call bell availability; Sleep/agitation log, frequent redirection, reorientation, reassurance; Family access to patient if helpful  - No recent reports of dangerous/risky behavior requiring 1:1 at this point but monitor closely  - Continue acute comprehensive interdisciplinary inpatient rehabilitation to include intensive skilled therapies (PT, OT, ST) with oversight and management by rehabilitation physician. Inpatient rehab level nursing, case management and weekly interdisciplinary team meetings.  Provide patient and (if available) caregiver/family teaching regarding brain injury/residual disability management. - For routine restlessness, anxiety, irritability focus on non-pharmacologic management    - Obtain MOCA when cog status stabilized   - Please assess iADLs - ability to manage medications, meal prep, finances, obtaining appropriate transport from community, managing emergencies, and overall safety in home environment. - Provide therapy, compensatory strategies, and if available and necessary provide caregiver training. Notify MD of impairments or inability to perform iADLs adequately. - Monitor neuro-exam, wakefulness, mood, cognition, insight into deficits and safety awareness   - Monitor and ensure optimal management electrolytes, nutrition, and hydration  - Monitor for signs or symptoms of infection, medication intolerances, other systemic etiologies  - Additional labs, imaging, specialist follow-up as needed   - Patient/family/caregiver education and training   - Overstimulation precautions, frequent re-orientation, re-direction, re-assurance  - Optimal mood, pain, and sleep management  - Sleep log and agitation monitoring    - Limit sedating medications when possible  - Follow-up with NeuroSx after discharge and if needed during ARC course as well as PCP      * SAH (subarachnoid hemorrhage) ([address])  Assessment & Plan  See SDH     Fracture of temporal bone ([address])  Assessment & Plan  - Acute comminuted otic sparing right temporal bone fracture involving the squamosal, mastoid, and tympanic portions with diastases of occipitotemporal suture and extension of fracture into visualized right occipital calvarium. Recommend follow-up CTV   head with contrast to assess underlying patency of right dural venous sinuses.   - Probable small-to-moderate RIGHT-sided bloody mastoid effusion, large bloody middle ear effusion, and probable debris and blood products throughout external auditory canal.  - Small scalp hematoma in right occipito-temporal region.     LEFT TEMPORAL BONE CT  -Trace left mastoid effusion. Otherwise, normal LEFT temporal bone CT.     Per ENT  "right otic sparing temporal bone fracture   -Facial nerve intact, no need for surgical decompression  -Hearing loss in right ear- tuning fork exam consistent with conductive pattern secondary to debris in canal and middle ear-Recommend outpatient follow-up with audiogram next week or the following week after discharge  -Floxin drops twice daily 5 drops to R ear for 10 days due to debris in canal  -Please do not hesitate to reach out with any questions or concerns"  - OK to use Floxin drops in ear - will need full 10 day course starting now (was getting in eye)  - Unasyn for 1 week course per discharging providers who I spoke with on day of admission   - Did not undergo CTV/CTA due to concern for kidney function         History of bladder cancer  Assessment & Plan  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  - Monitor site and for s/s of infection  - Receiving Avelunab every 14 days and Aranesp - on hold with acute medical decline   - Imaging of C/A/P concerning for mets - per discharging provider "soft tissue mass, hepatic lesions, peritoneal stippling consistent with metastatic bladder cancer new from last CAT scan in August 2023"  - Follow-up with H-O - patient scheduled for OP appt with Dr Juan Diallo 9/26 - will follow-up with him next week to report overall condition and see if/when consideration for additional mgmt    Abnormal findings on imaging test  Assessment & Plan  CTA chest/abd/pelvis on 9/15 with multiple concerning lymph nodes/lesions for metastasis including: nodule at the L obturator internus muscle, L sided mesenteric lymph nodes, R external iliac lymph node, R sided retroperitoneal lymph nodes, hypodense structure along with R pelvic sidewall, lesions in the R inferior hepatic lobe, peritoneal soft tissue nodularity, R cardiophrenic lymph node, 2 enlarged retrocrural lymph nodes, and R middle lobe nodule. Follow-up with Hematology/Oncology (Can reach out to his OP H-O provider during ARC course)     Atrial flutter ([address])  Assessment & Plan  IM consulted and with overall management at their discretion during ARC course who has now c/s'd Cards  holding MTP to avoid AV josh blocker to avoid harmony  Rate control:  None  Antithrombotic: None - consider NOAC when cleared by NSx      Benign hypertension with chronic kidney disease, stage IV [facility])  Assessment & Plan  Lab Results   Component Value Date    EGFR 29 09/20/2023    EGFR 25 09/19/2023    EGFR 26 09/18/2023    CREATININE 2.04 (H) 09/20/2023    CREATININE 2.29 (H) 09/19/2023    CREATININE 2.23 (H) 09/18/2023   Elevated at times  Cards and Internal medicine consulted and co-management with their service  Monitor vitals with and without activity; monitor for orthostasis  Monitor hemoglobin, electrolytes, kidney function, hydration status   Current meds: Amlodipine, hydralazine, torsemide per IM/Cards        At risk for venous thromboembolism (VTE)  Assessment & Plan  SCDs, ambulation, and heparin       Insomnia  Assessment & Plan  Melatonin     GERD (gastroesophageal reflux disease)  Assessment & Plan  PPI    History of stroke  Assessment & Plan  Per IM  "[address] on 9/16 showed chronic appearing R caudate lacunar infarct. Currently on Lipitor 40mg daily. Would benefit from starting antiplatelet - would need to discuss with Neurosurgery after follow-up in 2 weeks.   Continue with PT/OT."    Anemia due to stage 4 chronic kidney disease (HCC)  Assessment & Plan  Hb stable at 10.2 on 9/20   Consult(ed) IM and comanagement with their service during ARC course   Monitor H/H, vitals, signs/symptoms of acute bleeding      Stage 4 chronic kidney disease [facility])  Assessment & Plan  Lab Results   Component Value Date    EGFR 29 09/20/2023    EGFR 25 09/19/2023    EGFR 26 09/18/2023    CREATININE 2.29 (H) 09/19/2023 CREATININE 2.23 (H) 09/18/2023   Monitor BUN/Cr intermittently as well as electrolytes   IM consulted to manage  Limit nephrotoxic agents when possible  Optimal BP mgmt       Anxiety and depression  Assessment & Plan  Celexa  Supportive counseling  Psychology consult while in [address] on and continue to encourage deep breathing/relaxation/behavioral management techniques      Ischemic cardiomyopathy  Assessment & Plan  IM consulted and with overall management at their discretion during ARC course (EF50-55%)  OP Cards     Coronary artery disease involving native coronary artery of native heart without angina pectoris  Assessment & Plan  IM consulted and with overall management at their discretion during ARC course  Antithrombotic: Currently none (was not on in OP setting) - consider after clearance with NSx   Statin  Optimal blood pressure and blood sugar control      Hyperlipidemia  Assessment & Plan  Statin     DMII (diabetes mellitus, type 2) ([facility])  Assessment & Plan  Lab Results   Component Value Date    HGBA1C 6.3 06/05/2023       Recent Labs     09/19/23  1156 09/19/23  1637 09/20/23  0637 09/20/23  1142   POCGLU 117 138 137 167*       Blood Sugar Average: Last 72 hrs:  (P) 152     Internal medicine consulted and management at their discretion  Monitor for signs and symptoms of hypoglycemia   Current meds: Lispro SS AC/HS, consistent carb diabetic diet        Other Medical Issues:  • Monitor for     Follow-up providers and other issues to be followed up after discharge  PCP  Neurosurgery  ENT  Oncology/urology  Cardiology  Chronic providers    CODE: Level 1: Full Code    Restrictions include: Fall precautions    Objective:     Allergies per EMR  Diagnostic Studies: Reviewed, no new imaging     See above as well    Laboratory: Labs reviewed  Results from last 7 days   Lab Units 09/20/23  0614   HEMOGLOBIN g/dL 10.2*   HEMATOCRIT % 31.1*   WBC Thousand/uL 11.99*     Results from last 7 days   Lab Units 09/20/23  0614 09/19/23  0552 09/18/23  0525   BUN mg/dL 30* 36* 40*   SODIUM mmol/L 136 136 135   POTASSIUM mmol/L 3.7 3.7 3.9   CHLORIDE mmol/L 104 108 106   CREATININE mg/dL 2.04* 2.29* 2.23*   AST U/L 10*  --   --    ALT U/L 8  --   --      Results from last 7 days   Lab Units 09/15/23  2128   PROTIME seconds 14.4   INR  1.10        Drug regimen reviewed, all potential adverse effects identified and addressed:    Scheduled Meds:  Current Facility-Administered Medications   Medication Dose Route Frequency Provider Last Rate   • acetaminophen  650 mg Oral Q6H PRN Smitha Linares MD     • amLODIPine  5 mg Oral BID Merlinda Hammond, CRNP     • ampicillin-sulbactam  3 g Intravenous Q12H Merlinda Hammond, CRNP Stopped (09/22/23 1118)   • ascorbic acid  500 mg Oral Daily Merlinda Hammond, CRNP     • atorvastatin  40 mg Oral HS Smitha Linares MD     • bisacodyl  10 mg Rectal Daily PRN Smitha Linares MD     • calcium carbonate  500 mg Oral Daily PRN Merlinda Hammond, CRNP     • citalopram  20 mg Oral Daily Smitha Linares MD     • ferrous gluconate  324 mg Oral Daily Before Breakfast Merlinda Hammond, CRNP     • fluticasone  1 spray Each Nare Daily Merlinda Hammond, CRNP     • folic acid  1 mg Oral Daily Merlinda Hammond, CRNP     • heparin (porcine)  5,000 Units Subcutaneous [facility] Smitha iLnares MD     • hydrALAZINE  10 mg Oral Q8H PRN Merlinda Hammond, CRNP     • hydrALAZINE  10 mg Oral TID Merlinda Hammond, CRNP     • loratadine  10 mg Oral Daily Merlinda Hammond, CRNP     • magnesium Oxide  400 mg Oral Daily Merlinda Hammond, CRNP     • melatonin  3 mg Oral HS Merlinda Hammond, CRNP     • ofloxacin  5 drop Otic Daily Smitha Linares MD     • oxyCODONE  2.5 mg Oral Q8H PRN Smitha Linares MD     • pantoprazole  40 mg Oral Early Morning Smitha Linares MD     • polyethylene glycol  17 g Oral Daily PRN Smitha Linares MD     • polyethylene glycol  17 g Oral Daily Smitha Linares MD     • saccharomyces boulardii  250 mg Oral BID LUKE Coffey     • senna  1 tablet Oral BID Payam Arizmendi MD     • sodium bicarbonate  1,300 mg Oral BID after meals Payam Arizmendi MD     • torsemide  20 mg Oral Every Other Day Payam Arizmendi MD     • trimethobenzamide  200 mg Intramuscular Q6H PRN LUKE Coffey         Chief Complaints:  Tired     Subjective:     Patient reports feeling more tired today. He notes some increased dizziness and nausea. He did vomit x1 earlier. He denies abdominal pain, fever, chills, sweats, calf pain, constipation, worsening vision, strength, sensation, or vertigo. ROS: A 10 point ROS was performed; negative except as noted above.        Physical Exam:  09/21/23 0815 -- 67 -- 174/88 Abnormal  -- -- --   09/21/23 0532 -- -- -- 168/76 -- -- Standing - Orthostatic VS   09/21/23 0531 -- -- -- 186/78 Abnormal  -- -- Sitting - Orthostatic VS   09/21/23 0530 -- -- -- 182/90 Abnormal  -- -- Lying - Orthostatic VS     Vitals above reviewed on date of encounter    GEN:  Seen sitting and lying appearing uncomfortable  HEENT/NECK: Stable facial ecchymosis  CARDIAC: Regular rate rhythm, no murmers, no rubs, no gallops  LUNGS:  clear to auscultation, no wheezes, rales, or rhonchi  ABDOMEN: Soft, non-tender, non-distended, normal active bowel sounds  EXTREMITIES/SKIN:  no calf edema, no calf tenderness to palpation  NEURO:   MENTAL STATUS: increased tiredness, orientation intact, slightly sluggish mentation speed, CN II-XII: PERRLA, EOMI, tongue midline, no facial weakness and Strength/MMT:  ; FTN intact  PSYCH:  Affect:  Flattened    HPI:  27-year-old male with a past medical history of 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 Patient was evaluated by neurosurgery recommended nonoperative management with seizure prophylaxis and repeat CT head in 2 weeks. Patient evaluated by ENT and also recommended nonoperative management. He did note some hearing loss and recommended follow-up with them which also should include an audiogram.  He was recommended Unasyn by trauma as well as Floxin drops in his right ear. Course notable for new onset A-flutter. Full anticoagulation and antiplatelets have been on hold due to bleed. He may require Eliquis in the future. Patient evaluated by skilled therapies and noted have significant decline in ADLs and mobility and appears appropriate for admission to acute rehab at this time.       ** Please Note: Fluency Direct voice to text software may have been used in the creation of this document. **    I personally performed the required components and examined the patient myself in person on 9/21/23.    55 minutes or greater spent for this encounter which included a combination of face-to-face time with the patient and non-face-to-face time which in part specifically includes management of TBI. Face-to-face time included extended discussion with patient regarding current condition, medical history, mood, medical/rehabilitation management, and disposition. Non face-to-face time included coordination of care with patient's co-managing AP and/or physician(s) thru communication and review of their recent documentation as well as reviewing vitals, bowel/bladder function, recent labs, and notes from therapy, CM, and nursing. In addition, this patient was discussed by the interdisciplinary team in weekly case conference today. The care of the patient was extensively discussed with all care providers and an appropriate rehabilitation plan was formulated unique for this patient.  Barriers were identified preventing progression of therapy and appropriate interventions were discussed with each discipline.  Please see the team note for input from all disciplines regarding barriers, intervention, and discharge planning

## 2023-09-22 NOTE — ASSESSMENT & PLAN NOTE
Wife has noted that patient snores at HS. Obtaining overnight noc ox on 9/22 to determine if patient has sleep apnea.

## 2023-09-22 NOTE — ASSESSMENT & PLAN NOTE
9/29 Discussed with NSx AP CTH and overall condition and pt ok to d/c with OP follow-up by them next week  CTH - overall improving; No acute intracranial abnormality. Multifocal intracranial hemorrhages have improved since prior exam, as detailed below: Improved trace residual subacute subarachnoid hemorrhage in right anterior frontal region.  - Improved small subacute brain contusions in bilateral anterior frontal lobes with resolved hyperdense blood products and mild underlying vasogenic edema. Improved trace residual subacute intraventricular hemorrhage layering occipital horn of left lateral ventricle. 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 Blind MOCA (due to severe macular degen) 18 of of 22 on 9/29 - wnl    9/28 - function improved well; headache resolved, dizziness improved, nausea improved, sleep improved; CG training completed   9/27 - appears to be doing quite a bit better today; improved interaction, fatigue, and headache which is encouraging - continue to monitor closely   9/26 - slept better overnight, improving overall   9/25 - appears to be better today; but still difficulty sleeping - neuro exam stable; will trial adding trazodone 25mg HS   9/22 - clinically better appearing and functioning; BP stabilizing some    9/21 - BP elevated significantly at times, some increased fatigue, headache  - 9/21 - CTH - Interval increasing CSF density right subdural hygroma with increasing mass effect on the sulci. Trace midline shift to the left. Stable hyperdense hemorrhages seen in the right subarachnoid/subdural along the right frontal and right temporal regions. No new area of acute hemorrhage demonstrated. stable left frontal inferior contusions.  Stable ventricular size and stable volume of the hyperdense layering blood products in the occipital horns.    - Discussed with NSx with tele-c/s - stable to remain in [facility] setting with plan to repeat [facility] in 1 week 9/29 and at that time discuss embolization    - Fall while carrying heavy object into truck causing head trauma and multicompartmental hemorrhage with small foci of subarachnoid hemorrhage in the bilateral inferior frontal region and additional small foci of subarachnoid hemorrhage as well as R significant temporal bone fracture and small scalp hematoma in right occipito-temporal region.  - Per NSx - CTA/CTV on hold due to low GFR and CKD 4. Consider in the outpatient setting if there is concern for sinus thrombosis given pneumocephalus within the sinus near the area of fracture.     - Residual impairments: Imbalance, incoordination, cog deficits (assess for other impairments during ARC course)   - Keppra seizure prophylaxis thru 9/21 completed   - Current/recent mood/affect/behavior/cog - less flattened, more interactive   - Recommend  PT, OT, ST, RN and d/c home with wife with supervision

## 2023-09-22 NOTE — PROGRESS NOTES
09/22/23 1530   Pain Assessment   Pain Assessment Tool 0-10   Pain Score No Pain   Restrictions/Precautions   Precautions Cognitive; Fall Risk;Seizure;Visual deficit   Comprehension   Comprehension (FIM) 5 - Needs help/cues, repetition only RARELY ( < 10% of the time)   Expression   Expression (FIM) 6 - Expresses complex/abstract but requires:  more time   Social Interaction   Social Interaction (FIM) 6 - Interacts appropriately with others BUT requires extra  time   Problem Solving   Problem solving (FIM) 4 - Solves basic problems 75-89% of time   Memory   Memory (FIM) 4 - Recognizes/recalls/performs 75-89%   Speech/Language/Cognition Assessmetn   Treatment Assessment Pt. Seen for skilled ST session to target cognitive linguistic deficits. Patient reclined in bed with lights and tv off. Pt. And spouse reported lights were off due to eye pain. SLP engaged in short rapport building as patient new to this SLP. Pt. Notes fall with head strike and previous dx of macular degeneration resulting in severe visual deficits. Pt. Refused all tasks that required use of visual skills. Patient did reprot he was previously filling his pill box and would write instructions on bottle with marker and utilize magnifying glass/ reading glasses to allow for visualization to allow independence of task. Patient did note he would be in agreement with spouse taking over filling pill box to ease burden. Pt. Did report he has an optomology appointmet to address macular degeneration in hopes to increase vision and independence. Pt. Engaged in recall of purpose of current medication and times/day needed. Task completed with 56% accuracy. MD arrived during session and reported changes to medications to address blood pressure.   Final task patient engaged in targeted introducing STM strategies and home daily cognitive communication exercise program.  Patient denies cognitive deficits but accepting of education for strategies and exercises program to utilize PRN. At this time, pt is recommended for further skilled SLP services targeting overall and higher level cognitive linguistic skills to maximize level of functioning and independence on discharge. SLP Therapy Minutes   SLP Time In 8182   SLP Time Out 1600   SLP Total Time (minutes) 30   SLP Mode of treatment - Individual (minutes) 30   SLP Mode of treatment - Concurrent (minutes) 0   SLP Mode of treatment - Group (minutes) 0   SLP Mode of treatment - Co-treat (minutes) 0   SLP Mode of Treatment - Total time(minutes) 30 minutes   SLP Cumulative Minutes 120   Therapy Time missed   Time missed?  No

## 2023-09-22 NOTE — ASSESSMENT & PLAN NOTE
Lab Results   Component Value Date    EGFR 29 09/20/2023    EGFR 25 09/19/2023    EGFR 26 09/18/2023    CREATININE 2.04 (H) 09/20/2023    CREATININE 2.29 (H) 09/19/2023    CREATININE 2.23 (H) 09/18/2023     Creatinine currently 2.04. Baseline creatinine 2.5-2.8. Avoid nephrotoxins as able - losartan currently on hold. Receiving torsemide 20mg EOD. Ensure adequate hydration. Currently being seen by Vascular for possible fistula creation as OP - on hold due to cancer treatment. Continue sodium bicarbonate 1300mg BID.   Follows with Nephrology as outpatient - Dr. Vonnie Diaz.

## 2023-09-22 NOTE — PLAN OF CARE
Problem: NEUROSENSORY - ADULT  Goal: Achieves stable or improved neurological status  Description: INTERVENTIONS  - Monitor and report changes in neurological status  - Monitor vital signs such as temperature, blood pressure, glucose, and any other labs ordered   - Initiate measures to prevent increased intracranial pressure  - Monitor for seizure activity and implement precautions if appropriate      Outcome: Progressing  Goal: Remains free of injury related to seizures activity  Description: INTERVENTIONS  - Maintain airway, patient safety  and administer oxygen as ordered  - Monitor patient for seizure activity, document and report duration and description of seizure to physician/advanced practitioner  - If seizure occurs,  ensure patient safety during seizure  - Reorient patient post seizure  - Seizure pads on all 4 side rails  - Instruct patient/family to notify RN of any seizure activity including if an aura is experienced  - Instruct patient/family to call for assistance with activity based on nursing assessment  - Administer anti-seizure medications if ordered    Outcome: Progressing     Problem: HEMATOLOGIC - ADULT  Goal: Maintains hematologic stability  Description: INTERVENTIONS  - Assess for signs and symptoms of bleeding or hemorrhage  - Monitor labs  - Administer supportive blood products/factors as ordered and appropriate  Outcome: Progressing

## 2023-09-22 NOTE — ASSESSMENT & PLAN NOTE
Home regimen: losartan 25mg daily, metoprolol tartrate 25mg Q12, and torsemide 20mg EOD. Currently receiving amlodipine 5mg daily and torsemide 20mg EOD. Maintain SBP <160 due to recent intracranial bleed. Increased amlodipine to 5mg BID and started metoprolol tartrate 12.5mg Q12 on 9/20. Metoprolol tartrate 12.5mg d/c on 9/22. Ordered PRN hydralazine. Continue to monitor BP with routine VS.  Follows with Dr. Stella Rivera (Cardiology) and Dr. Elkin Galindo (Nephrology) as outpatient. BPs have been elevated in rehab setting. Cardiology consulted on 9/21. Cardiology recommending hydralazine 50mg TID on 9/21. Hydralazine changed to 10mg TID on 9/22. Goal -160.

## 2023-09-22 NOTE — PROGRESS NOTES
Physical Medicine and Rehabilitation Progress Note  Hung Weathers 80 y.o. male MRN: [id number]  Unit/Bed#: [facility] 265-01 Encounter: [id number]      Assessment & Plan:     Decline in ADLs and mobility: Functional assessment        FIM  Care Score  Admit Score Recent Score    Total assist  1-100% or 2p    Tot     Max assist 2-51-75%    Sub LBD    Mod assist 3- 26-50%  Par  LBD   Min assist 3- 25% or < Par To hygiene, bathing To hygiene, bathing   CG assist 4  TA     Sup/Setup 4-5 Sup UBD    Mod-I/Indep 6 MI      Transfers  Min-mod assist  Min assist     Ambulation   150 ft mod assist  150 ft min assist     Stairs   4 steps min assist  4 steps      Goal: Supervision for most ADLs and for mobility  Major barriers:  Impaired cog, balance, risk of complications   Dispo: Home with [facility] 15 days from admission      SDH (subdural hematoma) (HCC)  Assessment & Plan  9/22 - clinically better appearing and functioning; BP stabilizing some    9/21 - BP elevated significantly at times, some increased fatigue, headache   - 9/21 - CTH - Interval increasing CSF density right subdural hygroma with increasing mass effect on the sulci. Trace midline shift to the left. Stable hyperdense hemorrhages seen in the right subarachnoid/subdural along the right frontal and right temporal regions. No new area of acute hemorrhage demonstrated. stable left frontal inferior contusions.  Stable ventricular size and stable volume of the hyperdense layering blood products in the occipital horns.    - Discussed with NSx with tele-c/s - stable to remain in ARC setting with plan to repeat CTH in 1 week 9/29 and at that time discuss embolization   -  monitor neuro exam closely and if decline repeat CTH in interim   - Optimal BP mgmt per IM  - Fall while carrying heavy object into truck causing head trauma and multicompartmental hemorrhage with small foci of subarachnoid hemorrhage in the bilateral inferior frontal region and additional small foci of subarachnoid hemorrhage as well as R significant temporal bone fracture and small scalp hematoma in right occipito-temporal region.  - Per NSx - CTA/CTV on hold due to low GFR and CKD 4. Consider in the outpatient setting if there is concern for sinus thrombosis given pneumocephalus within the sinus near the area of fracture. - Residual impairments: Imbalance, incoordination, cog deficits (assess for other impairments during ARC course)   - Cleared for heparin 5000 U SQ TID for VTE prophylaxis   - Repeat Head CT Fri 9/29 and follow-up with NSx after  - Keppra seizure prophylaxis thru 9/21 completed     - Current/recent mood/affect/behavior/cog - flattened somewhat more tired   - Remains at risk for increased confusion/delirium, restlessness, agitation, and fall - continue to monitor for concerns/changes  - Current psychotropics and potentially sedating medications:   • Citalopram  • (Keppra stopped 9/21)   • Melatonin 3mg HS  • PRN Oxy 2.5mg   - Monitor for need for 1:1 (Notify MD of potentially dangerous behavior such as significant impulsivity and trying to stand/get out of bed/chair unattended that could lead to fall/injury); High fall precautions with frequent rounding, patient close to nursing station if possible, frequent toileting/frequent offering urinal/bedpan (only if too immobile for safe toileting);  bed/chair alarm on at all times (high setting if needed); fall lj on side of bed (at discretion of nursing/therapy); do not leave unattended in bathroom, patient education; call bell availability; Sleep/agitation log, frequent redirection, reorientation, reassurance; Family access to patient if helpful  - No recent reports of dangerous/risky behavior requiring 1:1 at this point but monitor closely  - Continue acute comprehensive interdisciplinary inpatient rehabilitation to include intensive skilled therapies (PT, OT, ST) with oversight and management by rehabilitation physician.   Inpatient rehab level nursing, case management and weekly interdisciplinary team meetings. Provide patient and (if available) caregiver/family teaching regarding brain injury/residual disability management. - For routine restlessness, anxiety, irritability focus on non-pharmacologic management    - Obtain MOCA when cog status stabilized   - Please assess iADLs - ability to manage medications, meal prep, finances, obtaining appropriate transport from community, managing emergencies, and overall safety in home environment. - Provide therapy, compensatory strategies, and if available and necessary provide caregiver training. Notify MD of impairments or inability to perform iADLs adequately. - Monitor neuro-exam, wakefulness, mood, cognition, insight into deficits and safety awareness   - Monitor and ensure optimal management electrolytes, nutrition, and hydration  - Monitor for signs or symptoms of infection, medication intolerances, other systemic etiologies  - Additional labs, imaging, specialist follow-up as needed   - Patient/family/caregiver education and training   - Overstimulation precautions, frequent re-orientation, re-direction, re-assurance  - Optimal mood, pain, and sleep management  - Sleep log and agitation monitoring    - Limit sedating medications when possible  - Follow-up with NeuroSx after discharge and if needed during ARC course as well as PCP      * SAH (subarachnoid hemorrhage) ([address])  Assessment & Plan  See SDH     Fracture of temporal bone ([address])  Assessment & Plan  - Acute comminuted otic sparing right temporal bone fracture involving the squamosal, mastoid, and tympanic portions with diastases of occipitotemporal suture and extension of fracture into visualized right occipital calvarium. Recommend follow-up CTV   head with contrast to assess underlying patency of right dural venous sinuses.   - Probable small-to-moderate RIGHT-sided bloody mastoid effusion, large bloody middle ear effusion, and probable debris and blood products throughout external auditory canal.  - Small scalp hematoma in right occipito-temporal region.     LEFT TEMPORAL BONE CT  -Trace left mastoid effusion. Otherwise, normal LEFT temporal bone CT.     Per ENT  "right otic sparing temporal bone fracture   -Facial nerve intact, no need for surgical decompression  -Hearing loss in right ear- tuning fork exam consistent with conductive pattern secondary to debris in canal and middle ear-Recommend outpatient follow-up with audiogram next week or the following week after discharge  -Floxin drops twice daily 5 drops to R ear for 10 days due to debris in canal  -Please do not hesitate to reach out with any questions or concerns"  - OK to use Floxin drops in ear - will need full 10 day course starting now (was getting in eye)  - Unasyn for 1 week course per discharging providers who I spoke with on day of admission   - Did not undergo CTV/CTA due to concern for kidney function         History of bladder cancer  Assessment & Plan  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  - Monitor site and for s/s of infection  - Receiving Avelunab every 14 days and Aranesp - on hold with acute medical decline   - Imaging of C/A/P concerning for mets - per discharging provider "soft tissue mass, hepatic lesions, peritoneal stippling consistent with metastatic bladder cancer new from last CAT scan in August 2023"  - Follow-up with H-O - patient scheduled for OP appt with Dr Farooq Sides 9/26 - will follow-up with him next week to report overall condition and see if/when consideration for additional mgmt    Abnormal findings on imaging test  Assessment & Plan  CTA chest/abd/pelvis on 9/15 with multiple concerning lymph nodes/lesions for metastasis including: nodule at the L obturator internus muscle, L sided mesenteric lymph nodes, R external iliac lymph node, R sided retroperitoneal lymph nodes, hypodense structure along with R pelvic sidewall, lesions in the R inferior hepatic lobe, peritoneal soft tissue nodularity, R cardiophrenic lymph node, 2 enlarged retrocrural lymph nodes, and R middle lobe nodule. Follow-up with Hematology/Oncology (Can reach out to his OP H-O provider during ARC course)     Atrial flutter ([address])  Assessment & Plan  IM consulted and with overall management at their discretion during ARC course who has now c/s'd Cards  holding MTP to avoid AV josh blocker to avoid harmony  Rate control:  None  Antithrombotic: None - consider NOAC when cleared by NSx      Benign hypertension with chronic kidney disease, stage IV [facility])  Assessment & Plan  Lab Results   Component Value Date    EGFR 29 09/20/2023    EGFR 25 09/19/2023    EGFR 26 09/18/2023    CREATININE 2.04 (H) 09/20/2023    CREATININE 2.29 (H) 09/19/2023    CREATININE 2.23 (H) 09/18/2023   Elevated at times  Cards and Internal medicine consulted and co-management with their service  Monitor vitals with and without activity; monitor for orthostasis  Monitor hemoglobin, electrolytes, kidney function, hydration status   Current meds: Amlodipine, hydralazine, torsemide per IM/Cards        At risk for venous thromboembolism (VTE)  Assessment & Plan  SCDs, ambulation, and heparin       Insomnia  Assessment & Plan  Melatonin     GERD (gastroesophageal reflux disease)  Assessment & Plan  PPI    History of stroke  Assessment & Plan  Per IM  "[facility] on 9/16 showed chronic appearing R caudate lacunar infarct. Currently on Lipitor 40mg daily. Would benefit from starting antiplatelet - would need to discuss with Neurosurgery after follow-up in 2 weeks.   Continue with PT/OT."    Anemia due to stage 4 chronic kidney disease (HCC)  Assessment & Plan  Hb stable at 10.2 on 9/20   Consult(ed) IM and comanagement with their service during ARC course   Monitor H/H, vitals, signs/symptoms of acute bleeding      Stage 4 chronic kidney disease [facility])  Assessment & Plan  Lab Results   Component Value Date    EGFR 29 09/20/2023    EGFR 25 09/19/2023    EGFR 26 09/18/2023    CREATININE 2.29 (H) 09/19/2023    CREATININE 2.23 (H) 09/18/2023   Monitor BUN/Cr intermittently as well as electrolytes   IM consulted to manage  Limit nephrotoxic agents when possible  Optimal BP mgmt       Anxiety and depression  Assessment & Plan  Celexa  Supportive counseling  Psychology consult while in [address] on and continue to encourage deep breathing/relaxation/behavioral management techniques      Ischemic cardiomyopathy  Assessment & Plan  IM consulted and with overall management at their discretion during ARC course ([id number]%)  OP Cards     Coronary artery disease involving native coronary artery of native heart without angina pectoris  Assessment & Plan  IM consulted and with overall management at their discretion during ARC course  Antithrombotic: Currently none (was not on in OP setting) - consider after clearance with NSx   Statin  Optimal blood pressure and blood sugar control      Hyperlipidemia  Assessment & Plan  Statin     DMII (diabetes mellitus, type 2) ([address])  Assessment & Plan  Lab Results   Component Value Date    HGBA1C 6.3 06/05/2023       Recent Labs     09/20/23  2042 09/21/23  0654 09/21/23  1634 09/22/23  0626   POCGLU 146* 130 118 115       Blood Sugar Average: Last 72 hrs:  (P) [id number]     Internal medicine consulted and management at their discretion  Monitor for signs and symptoms of hypoglycemia   Current meds: None   consistent carb diabetic diet        Other Medical Issues:  • Monitor for     Follow-up providers and other issues to be followed up after discharge  PCP  Neurosurgery  ENT  Oncology/urology  Cardiology  Chronic providers    CODE: Level 1: Full Code    Restrictions include: Fall precautions    Objective:     Allergies per EMR  Diagnostic Studies: Reviewed, no new imaging     See above as well    Laboratory: Labs reviewed  Results from last 7 days   Lab Units 09/20/23  0614   HEMOGLOBIN g/dL 10.2*   HEMATOCRIT % 31.1*   WBC Thousand/uL 11.99*     Results from last 7 days   Lab Units 09/20/23  0614 09/19/23  0552 09/18/23  0525   BUN mg/dL 30* 36* 40*   SODIUM mmol/L 136 136 135   POTASSIUM mmol/L 3.7 3.7 3.9   CHLORIDE mmol/L 104 108 106   CREATININE mg/dL 2.04* 2.29* 2.23*   AST U/L 10*  --   --    ALT U/L 8  --   --      Results from last 7 days   Lab Units 09/15/23  2128   PROTIME seconds 14.4   INR  1.10        Drug regimen reviewed, all potential adverse effects identified and addressed:    Scheduled Meds:  Current Facility-Administered Medications   Medication Dose Route Frequency Provider Last Rate   • acetaminophen  650 mg Oral Q6H PRN Jhon Rodgers MD     • amLODIPine  5 mg Oral BID LUKE Ross     • ampicillin-sulbactam  3 g Intravenous Q12H LUKE Ross Stopped (09/22/23 1118)   • ascorbic acid  500 mg Oral Daily LUKE Ross     • atorvastatin  40 mg Oral HS Jhon Rodgers MD     • bisacodyl  10 mg Rectal Daily PRN Jhon Rodgers MD     • calcium carbonate  500 mg Oral Daily PRN LUKE Ross     • citalopram  20 mg Oral Daily Jhon Rodgers MD     • ferrous gluconate  324 mg Oral Daily Before Breakfast LUKE Ross     • fluticasone  1 spray Each Nare Daily LUKE Ross     • folic acid  1 mg Oral Daily LUKE Ross     • heparin (porcine)  5,000 Units Subcutaneous [facility] Jhon Rodgers MD     • hydrALAZINE  10 mg Oral Q8H PRN LUKE Ross     • hydrALAZINE  10 mg Oral TID LUKE Ross     • loratadine  10 mg Oral Daily LUKE Ross     • magnesium Oxide  400 mg Oral Daily LUKE Ross     • melatonin  3 mg Oral HS LUKE Ross     • ofloxacin  5 drop Otic Daily Jhon Rodgers MD     • oxyCODONE  2.5 mg Oral Q8H PRN Jhon Rodgers MD     • pantoprazole  40 mg Oral Early Morning Jhon Rodgers MD     • polyethylene glycol  17 g Oral Daily PRN Jhon Rodgers MD     • polyethylene glycol  17 g Oral Daily Eugene Carreon MD     • saccharomyces boulardii  250 mg Oral BID LUKE Hogan     • senna  1 tablet Oral BID Eugene Carreon MD     • sodium bicarbonate  1,300 mg Oral BID after meals Eugene Carreon MD     • torsemide  20 mg Oral Every Other Day Eugene Carreon MD     • trimethobenzamide  200 mg Intramuscular Q6H PRN LUKE Hogan         Chief Complaints:  Rehab follow-up     Subjective: On eval, patient reports feeling better today. He reports minimal headache and less dizziness with better appetite. He denies worsening strength, sensation, balance and reports doing better in tx. ROS: A 10 point ROS was performed; negative except as noted above. Physical Exam:  Vitals:    09/22/23 0845   BP: 142/70   Pulse: 61   Resp:    Temp:    SpO2:        GEN:  Sitting, more well appearing in NAD  HEENT/NECK: Stable facial ecchymosis, MMM  CARDIAC: Regular rate rhythm, no murmers, no rubs, no gallops  LUNGS:  clear to auscultation, no wheezes, rales, or rhonchi  ABDOMEN: Soft, non-tender, non-distended, normal active bowel sounds  EXTREMITIES/SKIN:  no calf edema, no calf tenderness to palpation  NEURO:   MENTAL STATUS: Improved wakefulness and interaction, orientation intact, able to follow basic commands, CN II-XII: PERRLA, EOMI, tongue midline, no facial weakness and Strength/MMT:  ; FTN intact  PSYCH:  Affect:  Flattened    HPI:  66-year-old male with a past medical history of 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   Patient was evaluated by neurosurgery recommended nonoperative management with seizure prophylaxis and repeat CT head in 2 weeks. Patient evaluated by ENT and also recommended nonoperative management. He did note some hearing loss and recommended follow-up with them which also should include an audiogram.  He was recommended Unasyn by trauma as well as Floxin drops in his right ear. Course notable for new onset A-flutter. Full anticoagulation and antiplatelets have been on hold due to bleed. He may require Eliquis in the future. Patient evaluated by skilled therapies and noted have significant decline in ADLs and mobility and appears appropriate for admission to acute rehab at this time.       ** Please Note: Fluency Direct voice to text software may have been used in the creation of this document.  **

## 2023-09-22 NOTE — PROGRESS NOTES
Physical Medicine and Rehabilitation Progress Note  Paulino Vicente 80 y.o. male MRN: [id number]  Unit/Bed#: [facility] 265-01 Encounter: [id number]      Assessment & Plan:     Decline in ADLs and mobility: Functional assessment        FIM  Care Score  Admit Score Recent Score    Total assist  1-100% or 2p    Tot     Max assist 2-51-75%    Sub LBD    Mod assist 3- 26-50%  Par     Min assist 3- 25% or < Par To hygiene, bathing    CG assist 4  TA     Sup/Setup 4-5 Sup UBD    Mod-I/Indep 6 MI      Transfers  Min-mod assist      Ambulation   150 ft mod assist      Stairs   4 steps min assist       Goal: Supervision for most ADLs and for mobility  Major barriers:  Impaired cog, balance, risk of complications   Dispo: Home with [facility] 15 days from admission      [facility] (subarachnoid hemorrhage) ([address] [facility])  Assessment & Plan  9/20 - neuro exam stable - continue tx as outlined  - Fall while carrying heavy object into truck causing head trauma and multicompartmental hemorrhage with small foci of subarachnoid hemorrhage in the bilateral inferior frontal region and additional small foci of subarachnoid hemorrhage as well as R significant temporal bone fracture and small scalp hematoma in right occipito-temporal region.  - Per NSx - CTA/CTV on hold due to low GFR and CKD 4. Consider in the outpatient setting if there is concern for sinus thrombosis given pneumocephalus within the sinus near the area of fracture.     - Residual impairments: Imbalance, incoordination, cog deficits (assess for other impairments during ARC course)   - Cleared for heparin 5000 U SQ TID for VTE prophylaxis   - Repeat Head CT Fri 9/29 and follow-up with NSx after  - Keppra seizure prophylaxis thru 9/21     - Current/recent mood/affect/behavior/cog - somewhat flattened/euthymic  - Remains at risk for increased confusion/delirium, restlessness, agitation, and fall - continue to monitor for concerns/changes  - Current psychotropics and potentially sedating medications:   • Citalopram  • Keppra stopping 9/21  • Melatonin 3mg HS  • PRN Oxy 2.5mg   - Monitor for need for 1:1 (Notify MD of potentially dangerous behavior such as significant impulsivity and trying to stand/get out of bed/chair unattended that could lead to fall/injury); High fall precautions with frequent rounding, patient close to nursing station if possible, frequent toileting/frequent offering urinal/bedpan (only if too immobile for safe toileting);  bed/chair alarm on at all times (high setting if needed); fall lj on side of bed (at discretion of nursing/therapy); do not leave unattended in bathroom, patient education; call bell availability; Sleep/agitation log, frequent redirection, reorientation, reassurance; Family access to patient if helpful  - No recent reports of dangerous/risky behavior requiring 1:1 at this point but monitor closely  - Continue acute comprehensive interdisciplinary inpatient rehabilitation to include intensive skilled therapies (PT, OT, ST) with oversight and management by rehabilitation physician. Inpatient rehab level nursing, case management and weekly interdisciplinary team meetings. Provide patient and (if available) caregiver/family teaching regarding brain injury/residual disability management. - For routine restlessness, anxiety, irritability focus on non-pharmacologic management    - Obtain MOCA when cog status stabilized   - Please assess iADLs - ability to manage medications, meal prep, finances, obtaining appropriate transport from community, managing emergencies, and overall safety in home environment. - Provide therapy, compensatory strategies, and if available and necessary provide caregiver training. Notify MD of impairments or inability to perform iADLs adequately.    - Monitor neuro-exam, wakefulness, mood, cognition, insight into deficits and safety awareness   - Monitor and ensure optimal management electrolytes, nutrition, and hydration  - Monitor for signs or symptoms of infection, medication intolerances, other systemic etiologies  - Additional labs, imaging, specialist follow-up as needed   - Patient/family/caregiver education and training   - Overstimulation precautions, frequent re-orientation, re-direction, re-assurance  - Optimal mood, pain, and sleep management  - Sleep log and agitation monitoring    - Limit sedating medications when possible  - Follow-up with NeuroSx after discharge and if needed during ARC course as well as PCP      Fracture of temporal bone ([address])  Assessment & Plan  - Acute comminuted otic sparing right temporal bone fracture involving the squamosal, mastoid, and tympanic portions with diastases of occipitotemporal suture and extension of fracture into visualized right occipital calvarium. Recommend follow-up CTV   head with contrast to assess underlying patency of right dural venous sinuses. - Probable small-to-moderate RIGHT-sided bloody mastoid effusion, large bloody middle ear effusion, and probable debris and blood products throughout external auditory canal.  - Small scalp hematoma in right occipito-temporal region.     LEFT TEMPORAL BONE CT  -Trace left mastoid effusion. Otherwise, normal LEFT temporal bone CT.     Per ENT  "right otic sparing temporal bone fracture   -Facial nerve intact, no need for surgical decompression  -Hearing loss in right ear- tuning fork exam consistent with conductive pattern secondary to debris in canal and middle ear-Recommend outpatient follow-up with audiogram next week or the following week after discharge  -Floxin drops twice daily 5 drops to R ear for 10 days due to debris in canal  -Please do not hesitate to reach out with any questions or concerns"  - OK to use Floxin drops in ear - will need full 10 day course starting now (was getting in eye)  - Unasyn for 1 week course per discharging providers who I spoke with on day of admission   - Did not undergo CTV/CTA due to concern for kidney function         History of bladder cancer  Assessment & Plan  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  - Monitor site and for s/s of infection  - Receiving Avelunab every 14 days and Aranesp - on hold with acute medical decline   - Imaging of C/A/P concerning for mets - per discharging provider "soft tissue mass, hepatic lesions, peritoneal stippling consistent with metastatic bladder cancer new from last CAT scan in August 2023"  - Follow-up with H-O - patient scheduled for OP appt with Dr Bridger Carvajal 9/26 - will follow-up with him next week to report overall condition and see if/when consideration for additional mgmt    Abnormal findings on imaging test  Assessment & Plan  CTA chest/abd/pelvis on 9/15 with multiple concerning lymph nodes/lesions for metastasis including: nodule at the L obturator internus muscle, L sided mesenteric lymph nodes, R external iliac lymph node, R sided retroperitoneal lymph nodes, hypodense structure along with R pelvic sidewall, lesions in the R inferior hepatic lobe, peritoneal soft tissue nodularity, R cardiophrenic lymph node, 2 enlarged retrocrural lymph nodes, and R middle lobe nodule. Follow-up with Hematology/Oncology (Can reach out to his OP H-O provider during ARC course)     At risk for venous thromboembolism (VTE)  Assessment & Plan  SCDs, ambulation, and heparin       Insomnia  Assessment & Plan  Melatonin     GERD (gastroesophageal reflux disease)  Assessment & Plan  PPI    Atrial flutter (HCC)  Assessment & Plan  IM consulted and with overall management at their discretion during ARC course  Rate control: MTP 12.5mg Q12H   Antithrombotic: None - consideration for them once cleared by NSx       History of stroke  Assessment & Plan  Per IM  "[address] on 9/16 showed chronic appearing R caudate lacunar infarct. Currently on Lipitor 40mg daily.   Would benefit from starting antiplatelet - would need to discuss with Neurosurgery after follow-up in 2 weeks.   Continue with PT/OT."    Anemia due to stage 4 chronic kidney disease ([address])  Assessment & Plan  Consult(ed) IM and comanagement with their service during ARC course   Monitor H/H, vitals, signs/symptoms of acute bleeding      Stage 4 chronic kidney disease [facility])  Assessment & Plan  Lab Results   Component Value Date    EGFR 29 09/20/2023    EGFR 25 09/19/2023    EGFR 26 09/18/2023    CREATININE 2.29 (H) 09/19/2023    CREATININE 2.23 (H) 09/18/2023   Monitor BUN/Cr intermittently as well as electrolytes   IM consulted to manage  Limit nephrotoxic agents when possible  Optimal BP mgmt       Benign hypertension with chronic kidney disease, stage IV [facility])  Assessment & Plan  Lab Results   Component Value Date    EGFR 29 09/20/2023    EGFR 25 09/19/2023    EGFR 26 09/18/2023    CREATININE 2.04 (H) 09/20/2023    CREATININE 2.29 (H) 09/19/2023    CREATININE 2.23 (H) 09/18/2023     Internal medicine consulted and co-management with their service  Monitor vitals with and without activity; monitor for orthostasis  Monitor hemoglobin, electrolytes, kidney function, hydration status   Current meds: Amlodipine, MTP      Anxiety and depression  Assessment & Plan  Celexa  Supportive counseling  Psychology consult while in [address] on and continue to encourage deep breathing/relaxation/behavioral management techniques      Ischemic cardiomyopathy  Assessment & Plan  IM consulted and with overall management at their discretion during ARC course (EF50-55%)  OP Cards     Coronary artery disease involving native coronary artery of native heart without angina pectoris  Assessment & Plan  IM consulted and with overall management at their discretion during ARC course  Antithrombotic: Currently none (was not on in OP setting) - consider after clearance with NSx   Statin  Optimal blood pressure and blood sugar control      Hyperlipidemia  Assessment & Plan  Statin     DMII (diabetes mellitus, type 2) ([facility])  Assessment & Plan  Lab Results   Component Value Date    HGBA1C 6.3 06/05/2023       Recent Labs     09/19/23  1156 09/19/23  1637 09/20/23  0637 09/20/23  1142   POCGLU 117 138 137 167*       Blood Sugar Average: Last 72 hrs:  (P) 152     Internal medicine consulted and management at their discretion  Monitor for signs and symptoms of hypoglycemia   Current meds: Lispro SS AC/HS, consistent carb diabetic diet          Other Medical Issues:  • Monitor for     Follow-up providers and other issues to be followed up after discharge  PCP  Neurosurgery  ENT  Oncology/urology  Cardiology  Chronic providers    CODE: Level 1: Full Code    Restrictions include: Fall precautions    Objective: Allergies per EMR  Diagnostic Studies: Reviewed, no new imaging     See above as well    Laboratory: Labs reviewed  Results from last 7 days   Lab Units 09/20/23  0614 09/15/23  1306   HEMOGLOBIN g/dL 10.2* 10.3*   HEMATOCRIT % 31.1* 31.4*   WBC Thousand/uL 11.99* 15.04*     Results from last 7 days   Lab Units 09/20/23  0614 09/19/23  0552 09/18/23  0525 09/17/23  0446 09/15/23  1306   BUN mg/dL 30* 36* 40*   < > 57*   SODIUM mmol/L 136 136 135   < > 134*   POTASSIUM mmol/L 3.7 3.7 3.9   < > 3.8   CHLORIDE mmol/L 104 108 106   < > 103   CREATININE mg/dL 2.04* 2.29* 2.23*   < > 2.28*   AST U/L 10*  --   --   --  17   ALT U/L 8  --   --   --  15    < > = values in this interval not displayed.      Results from last 7 days   Lab Units 09/15/23  2128   PROTIME seconds 14.4   INR  1.10        Drug regimen reviewed, all potential adverse effects identified and addressed:    Scheduled Meds:  Current Facility-Administered Medications   Medication Dose Route Frequency Provider Last Rate   • acetaminophen  650 mg Oral Q6H PRN Rafat Goel MD     • amLODIPine  5 mg Oral BID LUKE Hui     • ampicillin-sulbactam  3 g Intravenous Q12H LUKE Hui Stopped (09/22/23 1118)   • ascorbic acid  500 mg Oral Daily LUKE Hui     • atorvastatin  40 mg Oral HS David Colindres MD     • bisacodyl  10 mg Rectal Daily PRN David Colindres MD     • calcium carbonate  500 mg Oral Daily PRN LUKE Benitez     • citalopram  20 mg Oral Daily David Colindres MD     • ferrous gluconate  324 mg Oral Daily Before Breakfast LUKE Benitez     • fluticasone  1 spray Each Nare Daily LUKE Benitez     • folic acid  1 mg Oral Daily LUKE Benitez     • heparin (porcine)  5,000 Units Subcutaneous [facility] David Colindres MD     • hydrALAZINE  10 mg Oral Q8H PRN LUKE Benitez     • hydrALAZINE  25 mg Oral TID LUKE Benitez     • loratadine  10 mg Oral Daily LUKE Benitez     • magnesium Oxide  400 mg Oral Daily LUKE Benitez     • melatonin  3 mg Oral HS LUKE Benitez     • ofloxacin  5 drop Otic Daily David Colindres MD     • oxyCODONE  2.5 mg Oral Q8H PRN David Colindres MD     • pantoprazole  40 mg Oral Early Morning Dvaid Colindres MD     • polyethylene glycol  17 g Oral Daily PRN David Colindres MD     • polyethylene glycol  17 g Oral Daily David Colindres MD     • saccharomyces boulardii  250 mg Oral BID LUKE Benitez     • senna  1 tablet Oral BID David Colindres MD     • sodium bicarbonate  1,300 mg Oral BID after meals David Colindres MD     • torsemide  20 mg Oral Every Other Day David Colindres MD     • trimethobenzamide  200 mg Intramuscular Q6H PRN LUKE Benitez         Chief Complaints:  Headache     Subjective:     Patient notes some headache, slight facial pain, and dizziness but stable last few days. He reports stable imbalance without worsening strength, sensation, nausea but reports impaired appetite. He denies other new complaints. ROS: A 10 point ROS was performed; negative except as noted above.        Physical Exam:  09/20/23 0814 -- 66 -- 162/90 -- -- Sitting     Vitals above reviewed on date of encounter    GEN:  Lying in bed in NAD HEENT/NECK: Stable facial ecchymosis  CARDIAC: Regular rate rhythm, no murmers, no rubs, no gallops  LUNGS:  clear to auscultation, no wheezes, rales, or rhonchi  ABDOMEN: Soft, non-tender, non-distended, normal active bowel sounds  EXTREMITIES/SKIN:  no calf edema, no calf tenderness to palpation  NEURO:   MENTAL STATUS: Adequate wakefulness, orientation intact, able to follow simple commands, CN II-XII: grossly intact  and Strength/MMT:  Near full throughout  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 Patient was evaluated by neurosurgery recommended nonoperative management with seizure prophylaxis and repeat CT head in 2 weeks. Patient evaluated by ENT and also recommended nonoperative management. He did note some hearing loss and recommended follow-up with them which also should include an audiogram.  He was recommended Unasyn by trauma as well as Floxin drops in his right ear. Course notable for new onset A-flutter. Full anticoagulation and antiplatelets have been on hold due to bleed. He may require Eliquis in the future. Patient evaluated by skilled therapies and noted have significant decline in ADLs and mobility and appears appropriate for admission to acute rehab at this time.       ** Please Note: Fluency Direct voice to text software may have been used in the creation of this document. **    I personally performed the required components and examined the patient myself in person on 9/20/23.

## 2023-09-22 NOTE — PROGRESS NOTES
Cardiology Progress Note - Claudia Kumar 80 y.o. male MRN: [id number]    Unit/Bed#: -01 Encounter: [id number]      Assessment & Plan:    Subdural/subarachnoid hemorrhage  -Occurred after mechanical fall  - CT head 9/15/2023 showed a multicompartment hemorrhage with small foci of subarachnoid hemorrhage in the bilateral inferior frontal region, small foci of subdural hemorrhage in the right insular region and temporal region without significant mass effect    Atrial flutter (HCC)  -Newly diagnosed during his hospitalization on 9/16/2023  - Not on beta-blockers due to bradycardia, flutter otherwise well controlled  - Currently holding anticoagulation given recent brain bleed  - Consider starting anticoagulation once cleared by neurosurgery    Chronic diastolic CHF (congestive heart failure) ([address])  - Ischemic cardiomyopathy  -TTE 9/16/2023 showed EF 50 to 55%, RV upper normal in size with moderate reduced function, moderate LA, mild RA, mild AS, mild to moderate MR, mild TR  - Current diuretic Rx torsemide 20 mg every other day  - Appears euvolemic currently    Coronary artery disease   -s/p CABG in 2014  - Was on aspirin and Plavix prior to his fall in September, currently holding    Hypertension  -Continue with amlodipine 5 mg twice daily, and hydralazine 25 mg 3 times daily    Hyperlipidemia  -Continue with atorvastatin 40 mg daily    Stage 4 chronic kidney disease (HCC)  -Baseline creatinine around 2.9-3.2    History of stroke  -Chronic lacunar infarct on CT head on 9/16/2023    DMII (diabetes mellitus, type 2) ([address])    History of bladder cancer    Anxiety and depression    Urethral tumor    Chemotherapy-induced thrombocytopenia    Fracture of temporal bone ([address])  - Occurred after mechanical fall in September 2023    Pneumocephalus    Summary:  -Blood pressure significantly improved today  - Hydralazine was reduced from 50 mg 3 times daily to 25 mg 3 times daily due to the significant reduction in his blood pressure this morning  - It was further reduced to 10 mg 3 times daily due to systolic blood pressures in the 100s during PT today  - Continue with hydralazine 10 mg 3 times daily for now, if his blood pressure remains low, can consider discontinuing and using amlodipine as monotherapy for his hypertension  - Otherwise stable from a cardiac standpoint    Cardiology will not see over the weekend. Please call with any questions or concerns. Subjective:   No significant events overnight. He had some lower blood pressures this morning and with physical therapy. Otherwise reports feeling okay this afternoon. Denies chest pain, shortness of breath, abdominal pain, nausea, vomiting, fever, chills, or palpitations. Objective:     Vitals: Blood pressure 142/70, pulse 61, temperature 99.3 °F (37.4 °C), temperature source Tympanic, resp. rate 18, height 5' 7" (1.702 m), weight 83.9 kg (184 lb 15.5 oz), SpO2 96 %. , Body mass index is 28.97 kg/m².,   Orthostatic Blood Pressures      Flowsheet Row Most Recent Value   Blood Pressure 142/70 filed at 09/22/2023 0845   Patient Position - Orthostatic VS Lying filed at 09/22/2023 0845              Intake/Output Summary (Last 24 hours) at 9/22/2023 1336  Last data filed at 9/22/2023 1217  Gross per 24 hour   Intake 620 ml   Output 1750 ml   Net -1130 ml           Physical Exam:    GEN: Jairo Geronimo appears well, alert and oriented x 3, pleasant and cooperative   HEENT: Mucous membranes moist, no scleral icterus, no conjunctival pallor, ecchymosis around eyes bilaterally  NECK: No elevated JVD  HEART: Regular rate and rhythm, normal S1 and S2, no murmurs or rubs   LUNGS: clear to auscultation bilaterally; no wheezes, rales, or rhonchi   ABDOMEN: normal bowel sounds, soft, no tenderness, no distention  EXTREMITIES: peripheral pulses normal; no lower extremity edema   NEURO: no focal findings   SKIN: No lesions or rashes on exposed skin        Current Facility-Administered Medications:     acetaminophen (TYLENOL) tablet 650 mg, 650 mg, Oral, Q6H PRN, Talya Carcamo MD, 650 mg at 09/22/23 0135    amLODIPine (NORVASC) tablet 5 mg, 5 mg, Oral, BID, LUKE Parsons, 5 mg at 09/22/23 0847    ampicillin-sulbactam (UNASYN) 3 g in sodium chloride 0.9 % 100 mL IVPB, 3 g, Intravenous, Q12H, LUKE Parsons, Stopped at 09/22/23 1118    ascorbic acid (VITAMIN C) tablet 500 mg, 500 mg, Oral, Daily, LUKE Parsons, 500 mg at 09/22/23 0847    atorvastatin (LIPITOR) tablet 40 mg, 40 mg, Oral, HS, Talya Carcamo MD, 40 mg at 09/21/23 2113    bisacodyl (DULCOLAX) rectal suppository 10 mg, 10 mg, Rectal, Daily PRN, Talya Carcamo MD    calcium carbonate (TUMS) chewable tablet 500 mg, 500 mg, Oral, Daily PRN, LUKE Parsons, 500 mg at 09/21/23 1231    citalopram (CeleXA) tablet 20 mg, 20 mg, Oral, Daily, Talya Carcamo MD, 20 mg at 09/22/23 0847    ferrous gluconate (FERGON) tablet 324 mg, 324 mg, Oral, Daily Before Breakfast, LUKE Parsons, 324 mg at 09/22/23 0625    fluticasone (FLONASE) 50 mcg/act nasal spray 1 spray, 1 spray, Each Nare, Daily, LUKE Parsons, 1 spray at 68/58/29 8729    folic acid (FOLVITE) tablet 1 mg, 1 mg, Oral, Daily, LUKE Parsons, 1 mg at 09/22/23 0847    heparin (porcine) subcutaneous injection 5,000 Units, 5,000 Units, Subcutaneous, Q8H [address], Talya Carcamo MD, 5,000 Units at 09/22/23 0515    hydrALAZINE (APRESOLINE) tablet 10 mg, 10 mg, Oral, Q8H PRN, LUKE Parsons, 10 mg at 09/21/23 1234    hydrALAZINE (APRESOLINE) tablet 10 mg, 10 mg, Oral, TID, Phoebe Saab, SHANNONNP    loratadine (CLARITIN) tablet 10 mg, 10 mg, Oral, Daily, Phoebe Saab, LUKE, 10 mg at 09/22/23 0847    magnesium Oxide (MAG-OX) tablet 400 mg, 400 mg, Oral, Daily, Phoebe Saab, LUKE, 400 mg at 09/22/23 0847    melatonin tablet 3 mg, 3 mg, Oral, HS, Phoebe Saab, LUKE, 3 mg at 09/21/23 2114    ofloxacin (OCUFLOX) 0.3 % ophthalmic solution 5 drop, 5 drop, Otic, Daily, Mikhail Phoenix MD, 5 drop at 09/22/23 0846    oxyCODONE (ROXICODONE) split tablet 2.5 mg, 2.5 mg, Oral, Q8H PRN, Mikhail Phoenix MD    pantoprazole (PROTONIX) EC tablet 40 mg, 40 mg, Oral, Early Morning, Mikhail Phoenix MD, 40 mg at 09/22/23 0515    polyethylene glycol (MIRALAX) packet 17 g, 17 g, Oral, Daily PRN, Mikhail Phoenix MD, 17 g at 09/19/23 2135    polyethylene glycol (MIRALAX) packet 17 g, 17 g, Oral, Daily, Mikhail Phoenix MD, 17 g at 09/22/23 4724    saccharomyces boulardii (FLORASTOR) capsule 250 mg, 250 mg, Oral, BID, Stuart Evelyn, CRNP, 250 mg at 09/22/23 0847    senna (SENOKOT) tablet 8.6 mg, 1 tablet, Oral, BID, Mikhail Phoenix MD, 8.6 mg at 09/22/23 0847    sodium bicarbonate tablet 1,300 mg, 1,300 mg, Oral, BID after meals, Mikhail Phoenix MD, 1,300 mg at 09/22/23 0846    torsemide (DEMADEX) tablet 20 mg, 20 mg, Oral, Every Other Day, Mikhail Phoenix MD, 20 mg at 09/21/23 0815    trimethobenzamide (TIGAN) IM injection 200 mg, 200 mg, Intramuscular, Q6H PRN, Dahiana Rivas, CRNP    Labs & Results:    Lab Results   Component Value Date    CKTOTAL 43 02/18/2021    CKTOTAL 103 10/17/2016    TROPONINI 0.83 (H) 02/18/2021    TROPONINI 0.98 (H) 02/18/2021    TROPONINI 0.96 (H) 02/18/2021       Lab Results   Component Value Date    GLUCOSE 128 08/13/2015    CALCIUM 8.5 09/20/2023     08/13/2015    K 3.7 09/20/2023    CO2 20 (L) 09/20/2023     09/20/2023    BUN 30 (H) 09/20/2023    CREATININE 2.04 (H) 09/20/2023       Lab Results   Component Value Date    WBC 11.99 (H) 09/20/2023    HGB 10.2 (L) 09/20/2023    HCT 31.1 (L) 09/20/2023    MCV 91 09/20/2023     09/20/2023     Results from last 7 days   Lab Units 09/15/23  2128   INR  1.10       Lab Results   Component Value Date    CHOL 140 08/13/2015    CHOL 147 01/14/2015     Lab Results   Component Value Date    HDL 61 10/25/2022    HDL 64 11/29/2021     Lab Results Component Value Date    LDLCALC 46 10/25/2022    LDLCALC 65 11/29/2021     Lab Results   Component Value Date    TRIG 80 10/25/2022    TRIG 69 11/29/2021       Lab Results   Component Value Date    ALT 8 09/20/2023    AST 10 (L) 09/20/2023    ALKPHOS 74 09/20/2023         EKG personally reviewed by )Silvio Dempsey MD. No acute changes

## 2023-09-22 NOTE — ASSESSMENT & PLAN NOTE
Experienced a-flutter in acute setting. ECHO on 9/16 showed EF 50-55%, abnormal diastolic inflow due to atrial flutter, L atrium moderately dilated, and R atrium mildly dilated. Metoprolol tartrate 12.5mg Q12 d/c on 9/22. Follows with Dr. Milton Waite (Cardiology) as outpatient.

## 2023-09-22 NOTE — PROGRESS NOTES
09/22/23 0901   Pain Assessment   Pain Assessment Tool 0-10   Pain Score No Pain   Restrictions/Precautions   Precautions Cognitive; Fall Risk;Seizure;Visual deficit  (manual BP only)   Weight Bearing Restrictions No   ROM Restrictions No   Oral Hygiene   Type of Assistance Needed Set-up / clean-up   Physical Assistance Level No physical assistance   Comment S/U A while seated in w/c at sink to brush teeth, seated 2* fatigue after completing showering routine   Oral Hygiene CARE Score 5   Grooming   Able To Initiate Tasks;Comb/Brush Hair;Brush/Clean Teeth;Wash/Dry Hands   Limitation Noted In Strength;Timeliness   Findings S/U A while seated in w/c at sink for all grooming tasks, 2* fatigue after showering (energy conservation technique)   Shower/Bathe Self   Type of Assistance Needed Physical assistance;Verbal cues; Adaptive equipment   Physical Assistance Level 25% or less   Comment Pt engaged in showering, able to wash 8/10 parts, with L forearm IV site covered with Tegaderm t/o shower. Seated on shower chair w/back, pt able with vc's for sequencing/progressing through body parts, able to wash UB and B/L upper legs. CGA in stance with unilateral UE support on shower bar while pt washed farzana/rear, no LOB. Pt required A to wash B/L lower legs/feet. Shower/Bathe Self CARE Score 3   Bathing   Assessed Bath Style Shower   Anticipated D/C Bath Style Shower;Sponge Bath   Able to [place] [employer] No   Able to [employer] Temperature Yes   Able to Wash/Rinse/Dry (body part) Left Arm;Right Arm;L Upper Leg;R Upper Leg;Chest;Abdomen;Perineal Area; Buttocks   Limitations Noted in Balance; Endurance;ROM;Strength;Timeliness;Vision   Positioning Seated;Standing   Adaptive Equipment Shower Bars; Shower Seat;Hand [facility]   Limitations Noted In Balance; Coordination; Endurance;ROM;LE Strength;UE Strength   Adaptive Equipment Grab Bars;Seat with Back   Assessed Shower   Findings Elmer fxl mob w/RW, EOB>shower chair, side-stepping in/out of wet shower environment with BUE support on shower bar, no LOB   Upper Body Dressing   Type of Assistance Needed Supervision   Physical Assistance Level No physical assistance   Comment seated   Upper Body Dressing CARE Score 4   Lower Body Dressing   Type of Assistance Needed Physical assistance;Verbal cues   Physical Assistance Level 26%-50%   Comment seated in w/c using dynamic reach to thread LEs through underwear and pants, with A to thread LLE, then CGA in stance while pt completed CM over hips, no LOB. Required vc's to thread LLE first.   Lower Body Dressing CARE Score 3   Putting On/Taking Off Footwear   Type of Assistance Needed Physical assistance;Verbal cues   Physical Assistance Level 26%-50%   Comment seated using combo of fxl reaching and xleg technique to doff/don socks. Pt cont to require A to don L sock   Putting On/Taking Off Footwear CARE Score 3   Dressing/Undressing Clothing   Remove UB Clothes Pullover Shirt   Don UB Clothes Pullover Shirt   Remove LB Clothes Pants; Undergarment;Socks   Don LB Clothes Pants; Undergarment;Socks   Limitations Noted In Balance; Coordination; Endurance;ROM; Timeliness;Strength   Positioning Supported Sit;Standing   Lying to Sitting on Side of Bed   Type of Assistance Needed Incidental touching   Physical Assistance Level No physical assistance   Comment required increased time w/CGA   Lying to Sitting on Side of Bed CARE Score 4   Sit to Stand   Type of Assistance Needed Physical assistance;Verbal cues; Adaptive equipment   Physical Assistance Level 25% or less   Comment Elmer w/RW, vc's for hand placement   Sit to Stand CARE Score 3   Bed-Chair Transfer   Type of Assistance Needed Physical assistance;Verbal cues; Adaptive equipment   Physical Assistance Level 25% or less   Comment Elmer short-distance in-room fxl mob w/RW   Chair/Bed-to-Chair Transfer CARE Score 3   Exercise Tools   Exercise Tools Yes   Other Exercise Tool 1 Seated w/Sup, [id number] each of alternating UE chest press, bicep curls, OH press, horiz ABD, and prograde rowing, using 1# DB (2# for bicep curls) for increased BUE strength for improved independence w/fxl transfers and sit<>stands. Pt tolerated well with brief rest breaks to manage proximal BUE fatigue. Cognition   Overall Cognitive Status Impaired   Arousal/Participation Alert; Cooperative   Attention Attends with cues to redirect   Orientation Level Oriented X4   Memory Decreased short term memory   Following Commands Follows one step commands with increased time or repetition   Activity Tolerance   Activity Tolerance Patient tolerated treatment well   Assessment   Treatment Assessment Pt seen for 90min skilled OT session focused on ADL retraining (shower), bed mobility, short-distance in-room fxl mob w/RW, LB dressing, and BUE strengthening, for increased independence w/ADLs/IADLs and decreased caregiver burden. See detailed descriptions of fxl performance above. Pt tolerated session well, but reported generalized fatigue after showering routine completed and required rest breaks during TherEx to manage. Pt cont to require overall Elmer for ADLs (bathing and dressing) and Elmer w/RW for fxl transfers. BP monitored and WNL, see vitals tab. Pt denied dizziness/lightheadedness t/o session. Pt cont to be limited by decreased act silvia, strength, standing balance, ROM, and standing tolerance. Pt would benefit from continued skilled OT focused on ADL retraining, act silvia, UE strengthening, endurance training, standing balance/tolerance, and D/C planning. Prognosis Good   Problem List Decreased strength;Decreased range of motion;Decreased endurance; Impaired balance;Decreased mobility; Decreased cognition; Impaired vision   Barriers to Discharge Inaccessible home environment;Decreased caregiver support   Plan   Treatment/Interventions ADL retraining;Functional transfer training; Therapeutic exercise; Endurance training;Cognitive reorientation;Patient/family training;Equipment eval/education; Bed mobility; Compensatory technique education   Progress Progressing toward goals   Recommendation   OT Discharge Recommendation   (pending)   OT Therapy Minutes   OT Time In 0900   OT Time Out 1030   OT Total Time (minutes) 90   OT Mode of treatment - Individual (minutes) 90   OT Mode of treatment - Concurrent (minutes) 0   OT Mode of treatment - Group (minutes) 0   OT Mode of treatment - Co-treat (minutes) 0   OT Mode of Treatment - Total time(minutes) 90 minutes   OT Cumulative Minutes 255   Therapy Time missed   Time missed?  No

## 2023-09-22 NOTE — PROGRESS NOTES
[facility]  Progress Note  Name: Dyana Khan  MRN: [id number]  Unit/Bed#: -01 I Date of Admission: 9/19/2023   Date of Service: 9/22/2023 I Hospital Day: 3    Assessment/Plan   Snoring  Assessment & Plan  Wife has noted that patient snores at HS. Obtaining overnight noc ox on 9/22 to determine if patient has sleep apnea. Acute headache  Assessment & Plan  Began with R sided HA on 9/21. Continued hypertension on 9/21 and questionable emesis. STAT CTH ordered on 9/21 and neurosurgery consulted. 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 Trace midline shift to the left. Stable hyperdense hemorrhages seen in the right subarachnoid/subdural along the right frontal and right temporal regions. No new area of acute hemorrhage. Stable left frontal interior contusions. Stable ventricular size and stable volume of the hyperdense layering blood products in the occipital horns. Neurosurgery recommending f/u CTH in 1 week. Discuss embo at that time. Monitor for neuro symptoms. Insomnia  Assessment & Plan  Difficulty sleeping in the hospital setting. Has taken melatonin in the past.  Started on melatonin 3mg HS on 9/20. Sinus congestion  Assessment & Plan  Chronic. Uses over the counter nasal sprays and antihistamines. Started on Claritin and Flonase on 9/20. Encouraged to follow-up with ENT as outpatient. GERD (gastroesophageal reflux disease)  Assessment & Plan  Continue Protonix 40mg daily as substitute for non-formulary omeprazole. Started on PRN Tums due to complaints of indigestion. Leukocytosis  Assessment & Plan  WBC count currently 11.99 from 15.04. Chronically elevated as outpatient. Currently no active s/s of infection. Currently receiving Unasyn due to open fracture. UA obtained on 9/19 - negative for infection. Continue to trend routine CBC.     Atrial flutter [facility])  Assessment & Plan  Noted to be in a-flutter in acute setting. ECHO obtained on 9/16: EF 50-55%, abnormal diastolic inflow due to atrial flutter, L atrium moderately dilated, and R atrium mildly dilated. Monitor routine VS.  Replete electrolytes as needed. Repeat Mg level due to taking magnesium supplements at home and not currently receiving. Mg 1.9 on 9/20. Restarted magnesium 400mg daily on 9/20. Last EKG showed QTc of 526. Repeat EKG on 9/20 showed QTc of 499. Currently not taking anticoagulation - [id number] score of 8. May benefit from anticoagulation - consider starting after follow-up with Neurosurgery. Follows with Dr. Miki Marinelli (Cardiology) as outpatient. Repeat EKG on 9/20 shows that patient is still in atrial flutter. Cardiology consulted 9/21. A-flutter rate controlled off AV node blockers, recommend to remain off for now. Consider DOAC once cleared by neurosurgery. Abnormal findings on imaging test  Assessment & Plan  CTA chest/abd/pelvis on 9/15 with multiple concerning lymph nodes/lesions for metastasis including: nodule at the L obturator internus muscle, L sided mesenteric lymph nodes, R external iliac lymph node, R sided retroperitoneal lymph nodes, hypodense structure along with R pelvic sidewall, lesions in the R inferior hepatic lobe, peritoneal soft tissue nodularity, R cardiophrenic lymph node, 2 enlarged retrocrural lymph nodes, and R middle lobe nodule. Follow-up with Hematology/Oncology as outpatient. Pneumocephalus  Assessment & Plan  Pneumocephalus within the sinus near the area of the fracture seen on CT. Consider obtaining CTA/CTV if concerns for sinus thrombosis. SDH (subdural hematoma) (HCC)  Assessment & Plan  S/p fall on 9/15. [address] on 9/15 showed small foci of subdural hemorrhage in the R insular region and temporal region without significant mass effect. Repeat CTH on 9/17 stable. Currently holding AC/AP.   Has been restarted on DVT ppx per Neurosurgery. Neurovascular checks Q shift. Maintain SBP <160. Continue Keppra for 7 days for seizure ppx. Follow-up with Neurosurgery in 2 weeks with repeat CTH (9/29). Primary team following. PT/OT. Fracture of temporal bone [facility])  Assessment & Plan  S/p fall on 9/15. CTH showed hematoma in the L parietal vertex and L occipital region, non-displaced fracture through the R temporal bone, linear non-displaced fracture through the posterior wall of the condyle with middle ear hemorrhage, fracture passes through the hypotympanum, non-displaced fracture through the R occipital bone. ENT consulted in acute setting - follow-up 1 week after discharge for audiogram.  No surgical intervention needed at this time. Continue Unasyn IV for 7 days total for open fracture (complete on 9/22). Continue Floxin drops to R ear for 10 days total (complete on 9/25). Consider CTA/CTV as needed if concerns for sinus thrombosis given pneumocephalus within the sinus near the area of the fracture. Had not been done inpatient due to concerns of frequent contrast administration with CKD stage 4. Anemia due to stage 4 chronic kidney disease (HCC)  Assessment & Plan  Hgb currently 10.2. Had been receiving ferrous sulfate as OP. Vitamin B12 1,238 on 9/20. Iron panel completed on 9/20 and showed iron sat 14%, TIBC 215, and iron 30. Ferritin 131. Folate 8. Started on folic acid 1mg daily, ferrous gluconate 324mg daily, and vitamin C 500mg daily on 9/21. Currently asymptomatic. Continue to trend routine CBC. Stage 4 chronic kidney disease [facility])  Assessment & Plan  Lab Results   Component Value Date    EGFR 29 09/20/2023    EGFR 25 09/19/2023    EGFR 26 09/18/2023    CREATININE 2.04 (H) 09/20/2023    CREATININE 2.29 (H) 09/19/2023    CREATININE 2.23 (H) 09/18/2023     Creatinine currently 2.04. Baseline creatinine 2.5-2.8. Avoid nephrotoxins as able - losartan currently on hold.   Receiving torsemide 20mg EOD.  Ensure adequate hydration. Currently being seen by Vascular for possible fistula creation as OP - on hold due to cancer treatment. Continue sodium bicarbonate 1300mg BID. Follows with Nephrology as outpatient - Dr. Sonya Holstein.    Benign hypertension with chronic kidney disease, stage IV [facility])  Assessment & Plan  Home regimen: losartan 25mg daily, metoprolol tartrate 25mg Q12, and torsemide 20mg EOD. Currently receiving amlodipine 5mg daily and torsemide 20mg EOD. Maintain SBP <160 due to recent intracranial bleed. Increased amlodipine to 5mg BID and started metoprolol tartrate 12.5mg Q12 on 9/20. Metoprolol tartrate 12.5mg d/c on 9/22. Ordered PRN hydralazine. Continue to monitor BP with routine VS.  Follows with Dr. Alex Wray (Cardiology) and Dr. Sonya Holstein (Nephrology) as outpatient. BPs have been elevated in rehab setting. Cardiology consulted on 9/21. Cardiology recommending hydralazine 50mg TID on 9/21. Hydralazine changed to 10mg TID on 9/22. Goal -160. Anxiety and depression  Assessment & Plan  Continue home Celexa 20mg daily. Supportive counseling as needed. Consider Neuropsych consult as needed. Ischemic cardiomyopathy  Assessment & Plan  Experienced a-flutter in acute setting. ECHO on 9/16 showed EF 50-55%, abnormal diastolic inflow due to atrial flutter, L atrium moderately dilated, and R atrium mildly dilated. Metoprolol tartrate 12.5mg Q12 d/c on 9/22. Follows with Dr. Alex Wray (Cardiology) as outpatient. Coronary artery disease involving native coronary artery of native heart without angina pectoris  Assessment & Plan  Hx of CABG. Takes metoprolol tartrate 25mg Q12 at home. Restarted metoprolol tartrate 12.5mg Q12 on 9/20 and d/c on 9/22. Continue Lipitor 40mg daily. Does not take ASA as outpatient - may benefit due to hx of CAD and possible stroke seen on CT. Recommend discussing AP/AC use at 2 week follow-up with Neurosurgery.     Elevated troponins in acute setting - felt to be type II MI in setting of trauma. 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 Underwent radical cystoprostatectomy with ileal conduit at [facility]. Recurrence in 2022 and was started chemotherapy. Currently receiving Avelunab and aranesp for 3 months. Follows with Dr. Ly Alas (Hem/Onc) as outpatient - due for appt on 9/26. Ordered PET scan for 9/19 but currently inpatient. CT chest/abd/pelvis in acute setting showed multiple concerns for metastasis. Follow-up with Hem/Onc as outpatient. Hyperlipidemia  Assessment & Plan  Continue home Lipitor 40mg daily. DMII (diabetes mellitus, type 2) [facility])  Assessment & Plan  Lab Results   Component Value Date    HGBA1C 6.3 06/05/2023       Recent Labs     09/20/23  2042 09/21/23  0654 09/21/23  1634 09/22/23  0626   POCGLU 146* 130 118 115       Blood Sugar Average: Last 72 hrs:  (P) [id number]   Currently diet controlled at home. A1c 6.3 on 6/5/2023. Roxiesonail Duff at home for renal protection. Continue SSI, QID accuchecks, and cons. carb diet. Change to BID accuchecks and discontinued sliding scale insulin today. * SAH (subarachnoid hemorrhage) ([address])  Assessment & Plan  S/p fall on 9/15. [facility] on 9/15 showed multicompartmental hemorrhage with small foci of subarachnoid hemorrhage in the bilateral inferior frontal region and additional small foci of subarachnoid hemorrhage. Repeat CTH on 9/17 stable. Hold AC/AP. Has been restarted on DVT ppx per Neurosurgery. Neurovascular checks Q shift. Maintain SBP <160. Continue Keppra for 7 days for seizure ppx. Follow-up with Neurovascular in 2 weeks with repeat CTH (9/29). Primary team following. PT/OT. VTE Pharmacologic Prophylaxis:   Pharmacologic: Heparin  Mechanical VTE Prophylaxis in Place: Yes - sequential compression devices.     Current Length of Stay: 3 day(s)    Current Patient Status: Inpatient Rehab     Discharge Plan: As per primary team.    Code Status: Level 1 - Full Code    Subjective:   Pt examined while laying in bed in pt room. Pt just returned from therapy, appears well today. Pt denies HA or visual changes. Denies any dizziness or lightheadedness. Denies numbness or tingling. Continues with muffled hearing to R ear. Felt like he tolerated therapy very well today. Updated on [address] results from yesterday and aware repeat will be done in a week and f/u with neuro. Appetite has been good and found foods he tolerated better for breakfast and lunch. No trouble swallowing. No further episodes of nausea or vomiting. Feels like he is having trouble falling asleep at night and finds it difficult to find a position of comfort. Did experience possible sore throat this morning, which then subsided. Per wife he does snore at night, so overnight oxygen study to be performed and pt agreeable. Ongoing chronic cough and congestion. Did not have any rhinorrhea today and feels his congestion is improved at present. After evaluation did mention to RN that he was having some eye strain, lights to be dimmed and low stimulation and to re-evaluate the patient. Otherwise, pt appeared to be improved from previous encounter. Objective:     Vitals:   Temp (24hrs), Av.5 °F (36.9 °C), Min:97.5 °F (36.4 °C), Max:99.3 °F (37.4 °C)    Temp:  [97.5 °F (36.4 °C)-99.3 °F (37.4 °C)] 99.3 °F (37.4 °C)  HR:  [60-70] 61  Resp:  [17-18] 18  BP: (142-168)/(58-82) 142/70  SpO2:  [96 %-97 %] 96 %  Body mass index is 28.97 kg/m². Review of Systems   Constitutional: Positive for appetite change (decreased, improved). Negative for chills, fatigue and fever. HENT: Positive for congestion (chronic), rhinorrhea (chronic), sore throat (possibly developed this morning, none at present, could be dry d/t snoring) and tinnitus (chronic). Negative for trouble swallowing.          R ear muffled   Eyes: Negative for photophobia and visual disturbance. Respiratory: Positive for cough (chronic). Negative for choking, chest tightness and shortness of breath. Cardiovascular: Negative for chest pain, palpitations and leg swelling. Gastrointestinal: Negative for abdominal distention, abdominal pain, constipation, diarrhea, nausea (no further episodes) and vomiting (no further episodes). Last BM 9/21   Genitourinary: Negative for difficulty urinating. Ileal conduit in place   Musculoskeletal: Negative for arthralgias and back pain. Neurological: Negative for dizziness, seizures, syncope, weakness, light-headedness, numbness and headaches. Psychiatric/Behavioral: Positive for sleep disturbance (difficulty falling asleep). Negative for behavioral problems. All other systems reviewed and are negative. Input and Output Summary (last 24 hours): Intake/Output Summary (Last 24 hours) at 9/22/2023 1508  Last data filed at 9/22/2023 1230  Gross per 24 hour   Intake 830 ml   Output 1750 ml   Net -920 ml       Physical Exam:     Physical Exam  Vitals and nursing note reviewed. Constitutional:       General: He is not in acute distress. Appearance: Normal appearance. He is not ill-appearing. HENT:      Head: Raccoon eyes present. Cardiovascular:      Rate and Rhythm: Normal rate. Rhythm irregular. Pulses: Normal pulses. Pulmonary:      Effort: Pulmonary effort is normal. No respiratory distress. Breath sounds: Normal breath sounds. Abdominal:      General: Bowel sounds are normal. There is no distension. Palpations: Abdomen is soft. There is no mass. Tenderness: There is no abdominal tenderness. There is no guarding or rebound. Hernia: No hernia is present. Comments: Ileal conduit in place   Musculoskeletal:      Cervical back: Normal range of motion and neck supple. No tenderness. Right lower leg: No edema. Left lower leg: No edema. Skin:     General: Skin is warm and dry. Capillary Refill: Capillary refill takes less than 2 seconds. Neurological:      General: No focal deficit present. Mental Status: He is alert and oriented to person, place, and time. Mental status is at baseline. Cranial Nerves: No cranial nerve deficit. Sensory: No sensory deficit.    Psychiatric:         Mood and Affect: Mood normal.         Behavior: Behavior normal.         Additional Data:     Labs:    Results from last 7 days   Lab Units 09/20/23  0614   WBC Thousand/uL 11.99*   HEMOGLOBIN g/dL 10.2*   HEMATOCRIT % 31.1*   PLATELETS Thousands/uL 228   NEUTROS PCT % 85*   LYMPHS PCT % 5*   MONOS PCT % 8   EOS PCT % 1     Results from last 7 days   Lab Units 09/20/23  0614   SODIUM mmol/L 136   POTASSIUM mmol/L 3.7   CHLORIDE mmol/L 104   CO2 mmol/L 20*   BUN mg/dL 30*   CREATININE mg/dL 2.04*   ANION GAP mmol/L 12   CALCIUM mg/dL 8.5   ALBUMIN g/dL 3.5   TOTAL BILIRUBIN mg/dL 0.54   ALK PHOS U/L 74   ALT U/L 8   AST U/L 10*   GLUCOSE RANDOM mg/dL 142*     Results from last 7 days   Lab Units 09/15/23  2128   INR  1.10     Results from last 7 days   Lab Units 09/22/23  0626 09/21/23  1634 09/21/23  0654 09/20/23  2042 09/20/23  1622 09/20/23  1142 09/20/23  0637 09/19/23  1637 09/19/23  1156 09/19/23  0807 09/18/23  1702 09/18/23  1130   POC GLUCOSE mg/dl 115 118 130 146* 142* 167* 137 138 117 109 116 190*               Labs reviewed    Imaging:    Imaging reviewed    Recent Cultures (last 7 days):           Last 24 Hours Medication List:   Current Facility-Administered Medications   Medication Dose Route Frequency Provider Last Rate   • acetaminophen  650 mg Oral Q6H PRN Ronie Boeck, MD     • amLODIPine  5 mg Oral BID LUKE Nina     • ampicillin-sulbactam  3 g Intravenous Q12H LUKE Nina Stopped (09/22/23 1118)   • ascorbic acid  500 mg Oral Daily LUKE Nina     • atorvastatin  40 mg Oral HS Ronie Boeck, MD • bisacodyl  10 mg Rectal Daily PRN Laurent Mata MD     • calcium carbonate  500 mg Oral Daily PRN LUKE Price     • citalopram  20 mg Oral Daily Laurent Mata MD     • ferrous gluconate  324 mg Oral Daily Before Breakfast LUKE Price     • fluticasone  1 spray Each Nare Daily LUKE Price     • folic acid  1 mg Oral Daily LUKE Price     • heparin (porcine)  5,000 Units Subcutaneous [facility] Laurent Mata MD     • hydrALAZINE  10 mg Oral Q8H PRN LUKE Price     • hydrALAZINE  10 mg Oral TID LUKE Price     • loratadine  10 mg Oral Daily LUKE Price     • magnesium Oxide  400 mg Oral Daily LUKE Price     • melatonin  3 mg Oral HS LUKE Price     • ofloxacin  5 drop Otic Daily Laurent Mata MD     • oxyCODONE  2.5 mg Oral Q8H PRN Laurent Mata MD     • pantoprazole  40 mg Oral Early Morning Laurent Mata MD     • polyethylene glycol  17 g Oral Daily PRN Laurent Mata MD     • polyethylene glycol  17 g Oral Daily Laurent Mata MD     • saccharomyces boulardii  250 mg Oral BID LUKE Price     • senna  1 tablet Oral BID Laurent Mata MD     • sodium bicarbonate  1,300 mg Oral BID after meals Laurent Mata MD     • torsemide  20 mg Oral Every Other Day Laurent Mata MD     • trimethobenzamide  200 mg Intramuscular Q6H PRN LUKE Price          M*Modal software was used to dictate this note. It may contain errors with dictating incorrect words or incorrect spelling. Please contact the provider directly with any questions.

## 2023-09-22 NOTE — ASSESSMENT & PLAN NOTE
Noted to be in a-flutter in acute setting. ECHO obtained on 9/16: EF 50-55%, abnormal diastolic inflow due to atrial flutter, L atrium moderately dilated, and R atrium mildly dilated. Monitor routine VS.  Replete electrolytes as needed. Repeat Mg level due to taking magnesium supplements at home and not currently receiving. Mg 1.9 on 9/20. Restarted magnesium 400mg daily on 9/20. Last EKG showed QTc of 526. Repeat EKG on 9/20 showed QTc of 499. Currently not taking anticoagulation - [id number] score of 8. May benefit from anticoagulation - consider starting after follow-up with Neurosurgery. Follows with Dr. Brooke Lemos (Cardiology) as outpatient. Repeat EKG on 9/20 shows that patient is still in atrial flutter. Cardiology consulted 9/21. A-flutter rate controlled off AV node blockers, recommend to remain off for now. Consider DOAC once cleared by neurosurgery.

## 2023-09-22 NOTE — ASSESSMENT & PLAN NOTE
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 Underwent radical cystoprostatectomy with ileal conduit at [facility]. Recurrence in 2022 and was started chemotherapy. Currently receiving Avelunab and aranesp for 3 months. Follows with Dr. Juan Diallo (Hem/Onc) as outpatient - due for appt on 9/26. Ordered PET scan for 9/19 but currently inpatient. CT chest/abd/pelvis in acute setting showed multiple concerns for metastasis. Follow-up with Hem/Onc as outpatient.

## 2023-09-22 NOTE — PROGRESS NOTES
09/22/23 1230   Pain Assessment   Pain Assessment Tool 0-10   Pain Score No Pain   Cognition   Overall Cognitive Status Impaired   Subjective   Subjective no complaints at this time; agreeable to therapy   Lying to Sitting on Side of Bed   Type of Assistance Needed Incidental touching   Lying to Sitting on Side of Bed CARE Score 4   Sit to Stand   Type of Assistance Needed Physical assistance   Physical Assistance Level 25% or less   Comment cues for hand placement   Sit to Stand CARE Score 3   Bed-Chair Transfer   Type of Assistance Needed Physical assistance; Adaptive equipment   Physical Assistance Level 25% or less   Comment with RW   Chair/Bed-to-Chair Transfer CARE Score 3   Transfer Bed/Chair/Wheelchair   Findings with and without AD during session; needs cues for hand placement, otherwise safe with txs   Walk 10 Feet   Type of Assistance Needed Physical assistance   Physical Assistance Level 25% or less   Comment with RW   Walk 10 Feet CARE Score 3   Walk 50 Feet with Two Turns   Type of Assistance Needed Physical assistance; Adaptive equipment   Physical Assistance Level 25% or less   Comment with RW   Walk 50 Feet with Two Turns CARE Score 3   Walk 150 Feet   Type of Assistance Needed Physical assistance   Physical Assistance Level 25% or less   Comment with RW   Walk 150 Feet CARE Score 3   Ambulation   Distance Walked (feet) 150 ft  (x2; 275'x1)   Assist Device Roller Walker  (and HHA on R)   Gait Pattern Inconsistant Ifrah; Slow Ifrah;Decreased foot clearance; Improper weight shift   Limitations Noted In Balance; Endurance; Heel Strike;Speed;Strength   Findings decrease step length B/L; use of RW and HHA this session for balance   Curb or Single Stair   Style negotiated Single stair   Type of Assistance Needed Physical assistance; Adaptive equipment   Physical Assistance Level 25% or less   Comment B HRs   1 Step (Curb) CARE Score 3   4 Steps   Type of Assistance Needed Physical assistance; Adaptive equipment   Physical Assistance Level 25% or less   Comment B HRs   4 Steps CARE Score 3   Stairs   # of Steps 4   Assist Devices Bilateral Rail   Findings on  steps; reciprocal pattern; no reports of dizziness or HA   Therapeutic Interventions   Other TUG 27sec with RW   Assessment   Treatment Assessment pt felt much better this session, with minor report of dizziness/feeling lightheaded with positional changes. pt did perform amb with no AD with HHA for balance training  pt takes shorter step length without AD.  pt reports he feels he is using a lot of UE on RW still during amb.  pt's BP start of session 108/60, put on abdominal binder, and TEDs per Lidia Parra. pt stil remains fall risk and below baseline at this time and will cont to benefit from strength and balance training to maximize functional ind and safety for d/c. Problem List Decreased strength;Decreased range of motion;Decreased endurance; Impaired balance;Decreased mobility; Decreased cognition; Impaired vision   Barriers to Discharge Decreased caregiver support; Inaccessible home environment   PT Barriers   Functional Limitation Walking;Transfers;Stair negotiation;Car transfers   Plan   Progress Progressing toward goals   Recommendation   PT Discharge Recommendation Post acute rehabilitation services   Equipment Recommended Walker   PT Therapy Minutes   PT Time In 1220   PT Time Out 1350   PT Total Time (minutes) 90   PT Mode of treatment - Individual (minutes) 70   PT Mode of treatment - Concurrent (minutes) 20   PT Mode of treatment - Group (minutes) 0   PT Mode of treatment - Co-treat (minutes) 0   PT Mode of Treatment - Total time(minutes) 90 minutes   PT Cumulative Minutes 150   Therapy Time missed   Time missed?  No

## 2023-09-22 NOTE — PLAN OF CARE
Problem: NEUROSENSORY - ADULT  Goal: Achieves maximal functionality and self care  Description: INTERVENTIONS  - Monitor swallowing and airway patency with patient fatigue and changes in neurological status  - Encourage and assist patient to increase activity and self care.    - Encourage visually impaired, hearing impaired and aphasic patients to use assistive/communication devices  Outcome: Progressing     Problem: GASTROINTESTINAL - ADULT  Goal: Maintains adequate nutritional intake  Description: INTERVENTIONS:  - Monitor percentage of each meal consumed  - Identify factors contributing to decreased intake, treat as appropriate  - Assist with meals as needed  - Monitor I&O, weight, and lab values if indicated  - Obtain nutrition services referral as needed  Outcome: Progressing

## 2023-09-23 LAB
GLUCOSE SERPL-MCNC: 120 MG/DL (ref 65–140)
GLUCOSE SERPL-MCNC: 155 MG/DL (ref 65–140)

## 2023-09-23 PROCEDURE — 97130 THER IVNTJ EA ADDL 15 MIN: CPT

## 2023-09-23 PROCEDURE — 97530 THERAPEUTIC ACTIVITIES: CPT

## 2023-09-23 PROCEDURE — 94762 N-INVAS EAR/PLS OXIMTRY CONT: CPT

## 2023-09-23 PROCEDURE — 97112 NEUROMUSCULAR REEDUCATION: CPT

## 2023-09-23 PROCEDURE — 97535 SELF CARE MNGMENT TRAINING: CPT

## 2023-09-23 PROCEDURE — 99232 SBSQ HOSP IP/OBS MODERATE 35: CPT | Performed by: STUDENT IN AN ORGANIZED HEALTH CARE EDUCATION/TRAINING PROGRAM

## 2023-09-23 PROCEDURE — 97110 THERAPEUTIC EXERCISES: CPT

## 2023-09-23 PROCEDURE — 82948 REAGENT STRIP/BLOOD GLUCOSE: CPT

## 2023-09-23 PROCEDURE — 97129 THER IVNTJ 1ST 15 MIN: CPT

## 2023-09-23 RX ORDER — LANOLIN ALCOHOL/MO/W.PET/CERES
6 CREAM (GRAM) TOPICAL
Status: DISCONTINUED | OUTPATIENT
Start: 2023-09-23 | End: 2023-09-25

## 2023-09-23 RX ADMIN — AMLODIPINE BESYLATE 5 MG: 5 TABLET ORAL at 09:59

## 2023-09-23 RX ADMIN — HEPARIN SODIUM 5000 UNITS: 5000 INJECTION INTRAVENOUS; SUBCUTANEOUS at 14:35

## 2023-09-23 RX ADMIN — HEPARIN SODIUM 5000 UNITS: 5000 INJECTION INTRAVENOUS; SUBCUTANEOUS at 06:20

## 2023-09-23 RX ADMIN — POLYETHYLENE GLYCOL 3350 17 G: 17 POWDER, FOR SOLUTION ORAL at 09:59

## 2023-09-23 RX ADMIN — ATORVASTATIN CALCIUM 40 MG: 40 TABLET, FILM COATED ORAL at 21:06

## 2023-09-23 RX ADMIN — OFLOXACIN 5 DROP: 3 SOLUTION OPHTHALMIC at 10:10

## 2023-09-23 RX ADMIN — Medication 250 MG: at 10:00

## 2023-09-23 RX ADMIN — AMLODIPINE BESYLATE 5 MG: 5 TABLET ORAL at 21:06

## 2023-09-23 RX ADMIN — HYDRALAZINE HYDROCHLORIDE 10 MG: 10 TABLET, FILM COATED ORAL at 10:04

## 2023-09-23 RX ADMIN — HYDRALAZINE HYDROCHLORIDE 10 MG: 10 TABLET, FILM COATED ORAL at 21:05

## 2023-09-23 RX ADMIN — MELATONIN TAB 3 MG 6 MG: 3 TAB at 21:05

## 2023-09-23 RX ADMIN — FERROUS GLUCONATE 324 MG: 324 TABLET ORAL at 06:20

## 2023-09-23 RX ADMIN — TORSEMIDE 20 MG: 20 TABLET ORAL at 10:21

## 2023-09-23 RX ADMIN — Medication 250 MG: at 17:35

## 2023-09-23 RX ADMIN — MAGNESIUM OXIDE TAB 400 MG (241.3 MG ELEMENTAL MG) 400 MG: 400 (241.3 MG) TAB at 10:00

## 2023-09-23 RX ADMIN — HEPARIN SODIUM 5000 UNITS: 5000 INJECTION INTRAVENOUS; SUBCUTANEOUS at 21:06

## 2023-09-23 RX ADMIN — SENNOSIDES 8.6 MG: 8.6 TABLET, FILM COATED ORAL at 10:00

## 2023-09-23 RX ADMIN — OXYCODONE HYDROCHLORIDE AND ACETAMINOPHEN 500 MG: 500 TABLET ORAL at 10:00

## 2023-09-23 RX ADMIN — SODIUM BICARBONATE 650 MG TABLET 1300 MG: at 17:36

## 2023-09-23 RX ADMIN — CITALOPRAM HYDROBROMIDE 20 MG: 20 TABLET ORAL at 10:00

## 2023-09-23 RX ADMIN — SENNOSIDES 8.6 MG: 8.6 TABLET, FILM COATED ORAL at 17:35

## 2023-09-23 RX ADMIN — LORATADINE 10 MG: 10 TABLET ORAL at 10:00

## 2023-09-23 RX ADMIN — FLUTICASONE PROPIONATE 1 SPRAY: 50 SPRAY, METERED NASAL at 10:09

## 2023-09-23 RX ADMIN — SODIUM BICARBONATE 650 MG TABLET 1300 MG: at 09:59

## 2023-09-23 RX ADMIN — ACETAMINOPHEN 650 MG: 325 TABLET ORAL at 20:14

## 2023-09-23 RX ADMIN — FOLIC ACID 1 MG: 1 TABLET ORAL at 10:01

## 2023-09-23 RX ADMIN — PANTOPRAZOLE SODIUM 40 MG: 40 TABLET, DELAYED RELEASE ORAL at 06:20

## 2023-09-23 NOTE — PROGRESS NOTES
09/23/23 1230   Pain Assessment   Pain Assessment Tool 0-10   Pain Score No Pain   Restrictions/Precautions   Precautions Cognitive; Fall Risk;Seizure;Visual deficit  (h/o macular degeneration; illeal conduit to sweet bag; manula BP's SBP goal 140)   Weight Bearing Restrictions No   ROM Restrictions No   Braces or Orthoses   (Teds and binder)   Sit to Lying   Type of Assistance Needed Supervision   Physical Assistance Level No physical assistance   Comment HOB slightly elevated   Sit to Lying CARE Score 4   Sit to Stand   Type of Assistance Needed Incidental touching; Adaptive equipment   Physical Assistance Level No physical assistance   Comment CGA with RW   Sit to Stand CARE Score 4   Bed-Chair Transfer   Type of Assistance Needed Incidental touching; Adaptive equipment   Physical Assistance Level No physical assistance   Comment CGA with RW; No c/o of dizziness   Chair/Bed-to-Chair Transfer CARE Score 4   Toileting Hygiene   Type of Assistance Needed Physical assistance   Physical Assistance Level 25% or less   Comment Occassional Rani for steadying as pt empties urine into toilet; Mainly CG in stance at RW for CM down/up; Seated for rear hygiene   Toileting Hygiene CARE Score 3   Toilet Transfer   Type of Assistance Needed Physical assistance; Adaptive equipment   Physical Assistance Level 25% or less   Comment Slight LOB when descending to raised toilet with RW   Toilet Transfer CARE Score 3   Health Management   Health Management Pt provided with current list of scheduled medications with review and EDU provided one ach medications purpose, frequency, and dosage. Medication reference sheet created, as pt presents with extensive list of scheduled medications. Pt reports utilizing a pill box for AM meds, a pill box for PM meds, and an additional pill box for medications taken prior to bedtime. Pt did present with question regarding Claritin, asking if medication can be administered prior to bed.  MD stevenson note updated. OT plan to simualte med managemen task during an upcoming Tx session. Please utilize magnifier and head light to assist with pt;s ability to read medication refremce sheet and pill bottles. Cognition   Overall Cognitive Status Impaired   Arousal/Participation Alert; Cooperative   Attention Attends with cues to redirect   Orientation Level Oriented X4   Memory Decreased short term memory   Following Commands Follows one step commands with increased time or repetition   Activity Tolerance   Activity Tolerance Patient tolerated treatment well   Other Comments   Assessment /60 upon beginning Tx session, ABD binder re-donned;  /72 at end of session   Assessment   Treatment Assessment Pt participated in 60 min skilled OTTx session with focus on ADL of toileting, fxnl mobility, and initiating medication management task. See above for further Tx details. Upon entering pt's room for Tx session, /60, binder re-donned. No c/o of dizziness or feeling lightheaded throughout session. Overall Rani for ADL of toileting, including management of urostomy bag. Pt experienced slight LOB when descending to toilet req Rani to correct. Initiated medication management task with medication reference sheet created. OT plan to simulate med management task during an upcoming Tx session, utilizing magnifier and headlight as pt has macular degeneration. Continues to be limited by dec standing balance, dec activity tolerance, dec strength, and dec ROM. Pt would continue to benefit from skilled OT Tx to continue addressing above noted barriers in order to progress towards OT goals and dec caregiver burden upon D/C. Prognosis Good   Problem List Decreased strength;Decreased range of motion;Decreased endurance; Impaired balance;Decreased mobility; Decreased cognition;Decreased coordination; Impaired vision   Barriers to Discharge Decreased caregiver support; Inaccessible home environment   Plan   Treatment/Interventions ADL retraining;Functional transfer training; Therapeutic exercise; Endurance training;Patient/family training   Progress Progressing toward goals   Recommendation   OT Discharge Recommendation   (Pending progress)   Equipment Recommended   (TBD)   OT Therapy Minutes   OT Time In 1230   OT Time Out 1330   OT Total Time (minutes) 60   OT Mode of treatment - Individual (minutes) 60   OT Mode of treatment - Concurrent (minutes) 0   OT Mode of treatment - Group (minutes) 0   OT Mode of treatment - Co-treat (minutes) 0   OT Mode of Treatment - Total time(minutes) 60 minutes   OT Cumulative Minutes 375   Therapy Time missed   Time missed?  No

## 2023-09-23 NOTE — PLAN OF CARE
Problem: RESPIRATORY - ADULT  Goal: Achieves optimal ventilation and oxygenation  Description: INTERVENTIONS:  - Assess for changes in respiratory status  - Assess for changes in mentation and behavior  - Position to facilitate oxygenation and minimize respiratory effort  - Oxygen administered by appropriate delivery if ordered  - Initiate smoking cessation education as indicated  - Encourage broncho-pulmonary hygiene including cough, deep breathe, Incentive Spirometry  - Assess the need for suctioning and aspirate as needed  - Assess and instruct to report SOB or any respiratory difficulty  - Respiratory Therapy support as indicated  Outcome: Progressing     Problem: GASTROINTESTINAL - ADULT  Goal: Maintains or returns to baseline bowel function  Description: INTERVENTIONS:  - Assess bowel function  - Encourage oral fluids to ensure adequate hydration  - Administer IV fluids if ordered to ensure adequate hydration  - Administer ordered medications as needed  - Encourage mobilization and activity  - Consider nutritional services referral to assist patient with adequate nutrition and appropriate food choices  Outcome: Progressing  Goal: Maintains adequate nutritional intake  Description: INTERVENTIONS:  - Monitor percentage of each meal consumed  - Identify factors contributing to decreased intake, treat as appropriate  - Assist with meals as needed  - Monitor I&O, weight, and lab values if indicated  - Obtain nutrition services referral as needed  Outcome: Progressing     Problem: GENITOURINARY - ADULT  Goal: Urinary catheter remains patent  Description: INTERVENTIONS:  - Assess patency of urinary catheter  - If patient has a chronic sweet, consider changing catheter if non-functioning  - Follow guidelines for intermittent irrigation of non-functioning urinary catheter  Outcome: Progressing

## 2023-09-23 NOTE — PROGRESS NOTES
09/23/23 1400   Pain Assessment   Pain Assessment Tool 0-10   Pain Score No Pain   Restrictions/Precautions   Weight Bearing Restrictions No   ROM Restrictions No   Braces or Orthoses   (Teds and binder)   Comprehension   Comprehension (FIM) 5 - Understands basic directions and conversation   Expression   Expression (FIM) 6 - Expresses complex/abstract but requires:  more time   Social Interaction   Social Interaction (FIM) 6 - Interacts appropriately with others BUT requires extra  time   Problem Solving   Problem solving (FIM) 4 - Solves basic problems 75-89% of time   Memory   Memory (FIM) 3 - Recognizes, recalls/performs 50-74%   Memory Skills   Orientation Level Oriented X4   Speech/Language/Cognition Assessmetn   Treatment Assessment Pt sleeping in bed upon SLP entering, but agreeable to ST. SLP introduced self and building rapport with pt briefly as she is new to pt's care. Pt with good recall of previous SLP name and memory strategies that were discussed. Pt also with good orientation to date, however with slow verbal processing. He demonstrated awareness and recall of recent events with his head strike and hospitalization. He reported good memory prior to this and stated he did not think his memory has changed since his TBI. SLP proceeded w/ word list retention task to assess this and offer skilled therapy strategies as needed. Pt first given four words, then asked to identify from memory which words belong to a target category. Pt performing at 7/8. SLP then increased the memory load to five words. Pt performing at 4/8 increasing to 5/8 when given two options of the words he could not recall. Of note, pt provided with cues and modeling for using both visualization and repetition strategy combined to increase his retention and recall. Pt showing benefit as this resulted in the last two sets to be accurate. SLP discussed how these skills can be carried over into everyday life as needed to improve memory. Pt nodding and verbalizing understanding. Pt recommended for continued cognitive therapy while at the [facility] to further optimize his cognitive-linguistic skills necessary for reducing burden of care. SLP Therapy Minutes   SLP Time In 1400   SLP Time Out 1430   SLP Total Time (minutes) 30   SLP Mode of treatment - Individual (minutes) 30   SLP Mode of treatment - Concurrent (minutes) 0   SLP Mode of treatment - Group (minutes) 0   SLP Mode of treatment - Co-treat (minutes) 0   SLP Mode of Treatment - Total time(minutes) 30 minutes   SLP Cumulative Minutes 150   Therapy Time missed   Time missed?  No

## 2023-09-23 NOTE — PLAN OF CARE
Problem: SAFETY ADULT  Goal: Patient will remain free of falls  Description: INTERVENTIONS:  - Educate patient/family on patient safety including physical limitations  - Instruct patient to call for assistance with activity   - Consult OT/PT to assist with strengthening/mobility   - Keep Call bell within reach  - Keep bed low and locked with side rails adjusted as appropriate  - Keep care items and personal belongings within reach  - Initiate and maintain comfort rounds  - Make Fall Risk Sign visible to staff  - Offer Toileting every 4Hours, in advance of need  -   - Obtain necessary fall risk management equipment: rw  - Apply yellow socks and bracelet for high fall risk patients  - Consider moving patient to room near nurses station  Outcome: Progressing

## 2023-09-23 NOTE — PROGRESS NOTES
PM&R Coverage Progress Note:    Rehab Diagnosis: Brain Dysfunction:  02.22  Traumatic, Closed Injury    Etiologic Diagnosis: Subdural and subarachnoid hemorrhage with temporal bone fracture    ASSESSMENT: Stable, nocturnal oximetry study completed      PLAN:    Rehabilitation  • Continue current rehabilitation plan of care to maximize function. • Patient's overall cognitive status is stable and per report from nursing as well as therapy seems to be doing fairly well today. Limited dizziness at this time  • Had bowel movement this morning    Medical issues  • Reviewed the overnight oximetry study showing desaturations below the expected range for only 1 minute and 46 seconds. Otherwise maintained mostly around 94 to 96% saturation overnight. • Continue current medical plan of care. Appreciate IM consultants co-management. SUBJECTIVE: Patient seen face to face. No acute issues. Progressing as expected in rehabilitation program.  Seen during therapy session overall states that the dizziness is improved today and that he is doing overall pretty well. He had a large bowel movement this morning. Reviewed nocturnal oximetry study results. Denies any fever, chills, nausea, vomiting, cough or shortness of breath, diarrhea or constipation at this time        ROS:  A ten point review of systems was completed on 09/23/23 and pertinent positives are listed in subjective section. All other systems reviewed were negative. OBJECTIVE:   /62 (BP Location: Right arm)   Pulse 65   Temp 98.3 °F (36.8 °C) (Tympanic)   Resp 18   Ht 5' 7" (1.702 m)   Wt 83.9 kg (184 lb 15.5 oz)   SpO2 98%   BMI 28.97 kg/m²     Physical Exam  Vitals reviewed. Constitutional:       General: He is not in acute distress. HENT:      Head: Normocephalic and atraumatic. Right Ear: External ear normal.      Left Ear: External ear normal.      Nose: Nose normal. No rhinorrhea.       Mouth/Throat:      Mouth: Mucous membranes are moist.      Pharynx: Oropharynx is clear. Eyes:      General: No scleral icterus. Cardiovascular:      Rate and Rhythm: Normal rate. Pulses: Normal pulses. Pulmonary:      Effort: Pulmonary effort is normal. No respiratory distress. Breath sounds: No wheezing, rhonchi or rales. Abdominal:      General: There is no distension. Palpations: Abdomen is soft. Musculoskeletal:      Cervical back: Normal range of motion and neck supple. Right lower leg: No edema. Left lower leg: No edema. Skin:     General: Skin is warm and dry. Neurological:      Mental Status: He is alert.    Psychiatric:         Mood and Affect: Mood normal.         Behavior: Behavior normal.            Personally reviewed on 09/23/23:   Lab Results   Component Value Date    WBC 11.99 (H) 09/20/2023    HGB 10.2 (L) 09/20/2023    HCT 31.1 (L) 09/20/2023    MCV 91 09/20/2023     09/20/2023     Lab Results   Component Value Date    SODIUM 136 09/20/2023    K 3.7 09/20/2023     09/20/2023    CO2 20 (L) 09/20/2023    BUN 30 (H) 09/20/2023    CREATININE 2.04 (H) 09/20/2023    GLUC 142 (H) 09/20/2023    CALCIUM 8.5 09/20/2023     Lab Results   Component Value Date    INR 1.10 09/15/2023    INR 1.25 (H) 02/19/2021    INR 1.04 11/26/2019    PROTIME 14.4 09/15/2023    PROTIME 15.7 (H) 02/19/2021    PROTIME 13.2 11/26/2019           Current Facility-Administered Medications:   •  acetaminophen (TYLENOL) tablet 650 mg, 650 mg, Oral, Q6H PRN, John Montes MD, 650 mg at 09/22/23 2305  •  amLODIPine (NORVASC) tablet 5 mg, 5 mg, Oral, BID, LUKE Ordaz, 5 mg at 09/23/23 2649  •  ascorbic acid (VITAMIN C) tablet 500 mg, 500 mg, Oral, Daily, LUKE Ordaz, 500 mg at 09/23/23 1000  •  atorvastatin (LIPITOR) tablet 40 mg, 40 mg, Oral, HS, John Montes MD, 40 mg at 09/22/23 2058  •  bisacodyl (DULCOLAX) rectal suppository 10 mg, 10 mg, Rectal, Daily PRN, John Montes MD  • calcium carbonate (TUMS) chewable tablet 500 mg, 500 mg, Oral, Daily PRN, Merlinda Hammond, CRNP, 500 mg at 09/21/23 1231  •  citalopram (CeleXA) tablet 20 mg, 20 mg, Oral, Daily, Smitha Linares MD, 20 mg at 09/23/23 1000  •  ferrous gluconate (FERGON) tablet 324 mg, 324 mg, Oral, Daily Before Breakfast, Merlinda Hammond, CRNP, 324 mg at 09/23/23 0620  •  fluticasone (FLONASE) 50 mcg/act nasal spray 1 spray, 1 spray, Each Nare, Daily, Merlinda Hammond, CRNP, 1 spray at 75/85/57 9996  •  folic acid (FOLVITE) tablet 1 mg, 1 mg, Oral, Daily, Merlinda Hammond, SHANNONNP, 1 mg at 09/23/23 1001  •  heparin (porcine) subcutaneous injection 5,000 Units, 5,000 Units, Subcutaneous, Q8H [address], Smitha Linares MD, 5,000 Units at 09/23/23 0620  •  hydrALAZINE (APRESOLINE) tablet 10 mg, 10 mg, Oral, Q8H PRN, Merlinda Hammond, CRNP, 10 mg at 09/21/23 1234  •  hydrALAZINE (APRESOLINE) tablet 10 mg, 10 mg, Oral, TID, Merlinda Hammond, CRNP, 10 mg at 09/23/23 1004  •  loratadine (CLARITIN) tablet 10 mg, 10 mg, Oral, Daily, Merlinda Hammond, CRNP, 10 mg at 09/23/23 1000  •  magnesium Oxide (MAG-OX) tablet 400 mg, 400 mg, Oral, Daily, Merlinda Hammond, SHANNONNP, 400 mg at 09/23/23 1000  •  melatonin tablet 3 mg, 3 mg, Oral, HS, Merlinda Hammond, SHANNONNP, 3 mg at 09/22/23 2159  •  ofloxacin (OCUFLOX) 0.3 % ophthalmic solution 5 drop, 5 drop, Otic, Daily, Smitha Linares MD, 5 drop at 09/23/23 1010  •  oxyCODONE (ROXICODONE) split tablet 2.5 mg, 2.5 mg, Oral, Q8H PRN, Smitha Linares MD  •  pantoprazole (PROTONIX) EC tablet 40 mg, 40 mg, Oral, Early Morning, Smitha Linares MD, 40 mg at 09/23/23 1283  •  polyethylene glycol (MIRALAX) packet 17 g, 17 g, Oral, Daily PRN, Smitha Linares MD, 17 g at 09/19/23 2135  •  polyethylene glycol (MIRALAX) packet 17 g, 17 g, Oral, Daily, Smitha Linares MD, 17 g at 09/23/23 3649  •  saccharomyces boulardii (FLORASTOR) capsule 250 mg, 250 mg, Oral, BID, Merlinda Hammond, CRNP, 250 mg at 09/23/23 1000  •  senna (SENOKOT) tablet 8.6 mg, 1 tablet, Oral, BID, Bonny Haywood MD, 8.6 mg at 09/23/23 1000  •  sodium bicarbonate tablet 1,300 mg, 1,300 mg, Oral, BID after meals, Bonny Haywood MD, 1,300 mg at 09/23/23 0959  •  torsemide (DEMADEX) tablet 20 mg, 20 mg, Oral, Every Other Day, Bonny Haywood MD, 20 mg at 09/23/23 1021  •  trimethobenzamide (TIGAN) IM injection 200 mg, 200 mg, Intramuscular, Q6H PRN, LUKE Garcia    Past Medical History:   Diagnosis Date   • Acute kidney injury ([address])    • Anemia Jan. 2022   • Arthritis     Hands   • Wright esophagus    • BPH with obstruction/lower urinary tract symptoms    • CAD (coronary artery disease)     Last assessed 09/16/15   • Cancer ([address])     bladder   • Cataract, acquired     Last assessed 10/10/17   • Chronic kidney disease    • Coronary artery disease    • Diabetes mellitus ([address])     NIDDM   • Diabetic neuropathy (HCC)     Feet   • Enlarged prostate with lower urinary tract symptoms (LUTS)     Last assessed 10/10/17   • Erectile dysfunction    • GERD (gastroesophageal reflux disease)     Last assessed 10/10/17   • Hemoptysis     Last assessed 03/14/16   • Hypercholesterolemia     Last assessed 10/10/17   • Hypertension     Last assessed 10/10/17   • Kidney stone    • Macular degeneration     Right eye is particularly affected-peripheral vision intact   • Myocardial infarction [facility]) 1998   • OAB (overactive bladder)    • Testicular hypofunction    • Testicular hypogonadism     Last assessed 10/10/17   • Tinnitus    • Umbilical hernia     Last assessed 06/18/14   • Urge incontinence of urine    • Wears glasses        Patient Active Problem List    Diagnosis Date Noted   • Acute headache 09/22/2023   • Snoring 09/22/2023   • Sinus congestion 09/20/2023   • Insomnia 09/20/2023   • At risk for venous thromboembolism (VTE) 09/20/2023   • History of stroke 09/19/2023   • Abnormal findings on imaging test 09/19/2023   • Atrial flutter ([address]) 09/19/2023   • Leukocytosis 09/19/2023   • GERD (gastroesophageal reflux disease) 09/19/2023   • Fracture of temporal bone ([address]) 09/15/2023   • SDH (subdural hematoma) ([address]) 09/15/2023   • Fall 09/15/2023   • SAH (subarachnoid hemorrhage) ([address]) 09/15/2023   • Pneumocephalus 09/15/2023   • Hyponatremia 09/05/2023   • Pelvic lymphadenopathy 05/16/2023   • Chronic diastolic CHF (congestive heart failure) ([address]) 05/04/2023   • Rib pain on left side 04/11/2023   • Left inguinal pain 04/11/2023   • Chemotherapy-induced thrombocytopenia 02/15/2023   • Shortness of breath 12/14/2022   • Anemia due to stage 4 chronic kidney disease ([address]) 12/07/2022   • Stage 4 chronic kidney disease ([address]) 08/10/2022   • Secondary hyperparathyroidism of renal origin ([address]) 05/19/2022   • 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   • Functional diarrhea 03/25/2022   • Platelets decreased ([address]) 03/25/2022   • Visual hallucinations 11/23/2021   • Chronic kidney disease-mineral and bone disorder 10/24/2021   • Benign hypertension with chronic kidney disease, stage IV ([address]) 07/19/2021   • Persistent proteinuria 07/19/2021   • Anemia 07/19/2021   • RBBB 05/07/2021   • Hypomagnesemia 79/00/5906   • Metabolic acidosis 31/67/1601   • Severe protein-calorie malnutrition ([address]) 02/19/2021   • Right sided Pelvic abscess in [facility]) 02/18/2021   • Ambulatory dysfunction 02/16/2021   • Frequent falls 02/16/2021   • Anxiety and depression 12/22/2020   • Overweight 12/22/2020   • Fungal dermatitis 12/03/2020   • Forearm mass, left 09/03/2020   • Elevated troponin 07/21/2020   • Liver function study, abnormal 06/25/2020   • Malignant neoplasm of urinary bladder neck ([address]) 03/24/2020   • Positive urinary cytology 11/05/2019   • Coronary artery disease involving native coronary artery of native heart without angina pectoris 09/09/2019   • Ischemic cardiomyopathy 09/09/2019   • History of bladder cancer 07/30/2019   • Closed fracture of upper end of right fibula 03/11/2019   • Malignant neoplasm of overlapping sites of bladder ([address]) 01/10/2019   • 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/2019   • Type 2 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 ([address]) 12/05/2018   • Bladder tumor 11/01/2018   • Overactive bladder 10/10/2018   • 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

## 2023-09-23 NOTE — NURSING NOTE
Norvasc 5mg & hydralazine 10mg held last night@ 2200 for /68 manually, parameters not met. This AM /74 manually. Will continue to monitor.

## 2023-09-23 NOTE — PROGRESS NOTES
09/23/23 1030   Pain Assessment   Pain Assessment Tool 0-10   Pain Score No Pain   Restrictions/Precautions   Precautions Cognitive; Fall Risk;Seizure;Visual deficit  (h/o macular degeneration, + illeal conduit to sweet bag, manual BPs SBP goal 140)   Braces or Orthoses   (B TEDs, ABD binder)   Lifestyle   Autonomy "I think I'm doing OK today"   Upper Body Dressing   Comment Assist for donning of ABD binder   Transfers   Additional Comments Elmer HOGUELAMAR on R fxnl mob to/from therapy gym. Noted some stumbling to L w/ mobility, pt able to mostly self correct, but some additional steadying assist provided. Exercise Tools   Other Exercise Tool 1 UE TE w/ 3-4# DB for 3x10 of the following exercises: chest press, elbow flx/extn, shoulder press, forearm pronation/supination, shoulder flx to 90*. Tolerated well. Completed to inc BUE strength for inc IND w/ ADLs/IADLs   Cognition   Overall Cognitive Status Impaired   Arousal/Participation Alert; Cooperative   Attention Attends with cues to redirect   Orientation Level Oriented X4   Memory Decreased short term memory   Following Commands Follows one step commands with increased time or repetition   Vision   Vision Comments hx MD severely limits fxnl vision   Activity Tolerance   Activity Tolerance Patient tolerated treatment well   Other Comments   Assessment Manual BPs assesed RUE. seated at start of session w/ TEDs on, ABD binder off 128/60. Donned binder as well, seated BP increased to 144/62. Assessment   Treatment Assessment OT session focusing on fxnl mobility, activity tolerance, UE TE. Manual BPs monitored t/o session, pt WFL w/ TEDs and ABD binder. Pt continues to require skilled hands on assist at this time and is appropriate for continued IPOT prior to DC home to inc pt's IND and safety. Pt scheduled for second OT session to occur this PM.   Prognosis Good   Problem List Decreased strength;Decreased endurance; Impaired balance;Decreased coordination;Decreased mobility; Decreased cognition; Impaired judgement;Decreased safety awareness; Impaired vision; Impaired hearing   Plan   Treatment/Interventions ADL retraining;Functional transfer training; Therapeutic exercise; Endurance training;Cognitive reorientation;Patient/family training;Equipment eval/education;Continued evaluation; Compensatory technique education   Progress Progressing toward goals   OT Therapy Minutes   OT Time In 1030   OT Time Out 1130   OT Total Time (minutes) 60   OT Mode of treatment - Individual (minutes) 60   OT Mode of treatment - Concurrent (minutes) 0   OT Mode of treatment - Group (minutes) 0   OT Mode of treatment - Co-treat (minutes) 0   OT Mode of Treatment - Total time(minutes) 60 minutes   OT Cumulative Minutes 315   Therapy Time missed   Time missed?  No

## 2023-09-23 NOTE — PROGRESS NOTES
09/23/23 0830   Pain Assessment   Pain Assessment Tool 0-10   Pain Score No Pain   Restrictions/Precautions   Precautions Cognitive; Fall Risk;Seizure;Visual deficit   Weight Bearing Restrictions No   ROM Restrictions No   Cognition   Overall Cognitive Status Impaired   Arousal/Participation Alert; Cooperative   Attention Attends with cues to redirect   Orientation Level Oriented X4   Memory Decreased short term memory   Following Commands Follows one step commands with increased time or repetition   Subjective   Subjective No complaints and pt agreeable to PT. Sit to Stand   Type of Assistance Needed Incidental touching;Verbal cues   Physical Assistance Level No physical assistance   Comment CGA with RW and without AD   Sit to Stand CARE Score 4   Bed-Chair Transfer   Type of Assistance Needed Incidental touching;Verbal cues   Physical Assistance Level No physical assistance   Comment CGA with RW   Chair/Bed-to-Chair Transfer CARE Score 4   Walk 10 Feet   Type of Assistance Needed Physical assistance;Verbal cues   Physical Assistance Level 25% or less   Comment with and without AD   Walk 10 Feet CARE Score 3   Walk 50 Feet with Two Turns   Type of Assistance Needed Physical assistance;Verbal cues   Physical Assistance Level 25% or less   Comment with and without RW   Walk 50 Feet with Two Turns CARE Score 3   Walk 150 Feet   Type of Assistance Needed Physical assistance;Verbal cues   Physical Assistance Level 25% or less   Comment with and without AD   Walk 150 Feet CARE Score 3   Ambulation   Primary Mode of Locomotion Prior to Admission Walk   Distance Walked (feet) 120 ft  (240ft x2)   Assist Device Roller Walker   Gait Pattern Inconsistant Ifrah; Slow Ifrah;Decreased foot clearance   Limitations Noted In Balance; Endurance; Heel Strike;Speed;Strength;Swing   Provided Assistance with: Balance;Direction   Walk Assist Level Contact Guard   Findings Pt amb 240ft and 120ft with RW and CGA.  Amb 240 without AD with CGA/Rani - decreased right foot clearance   Does the patient walk? 2. Yes   Wheel 50 Feet with Two Turns   Reason if not Attempted Activity not applicable   Wheel 50 Feet with Two Turns CARE Score 9   Wheel 150 Feet   Reason if not Attempted Activity not applicable   Wheel 363 Feet CARE Score 9   Wheelchair mobility   Does the patient use a wheelchair? 0. No   Curb or Single Stair   Style negotiated Single stair   Type of Assistance Needed Incidental touching;Verbal cues   Physical Assistance Level No physical assistance   Comment up/down 4 stairs with BHRs   1 Step (Curb) CARE Score 4   4 Steps   Type of Assistance Needed Incidental touching;Verbal cues   Physical Assistance Level No physical assistance   Comment up/down 4 stairs with BHRs   4 Steps CARE Score 4   Stairs   Type Stairs   # of Steps 4   Assist Devices Bilateral Rail   Therapeutic Interventions   Balance obstacle course stepping over SPCs and arounds cones with no AD CG/Rani; fwd/bwd stepping over SPCs with Rani; sidestepping over SPC on floor Rani; tap ups onto 6" step with focus on SLS requiring Rani   Other Comments   Comments Pt /68- manually at start of session, Della marley. BP stable t/o session   Assessment   Treatment Assessment Pt participated in 60 minute PT session focused on functional mobility and balance. Ambulation completed with and without RW. Pt completes ambulation with RW with CS/CGA, but requires Rani without a device 2/2 to impaired balance. Patient demonstrates decreased R foot clearance and increased lateral sway. Corinne Favor continues to require skilled PT interventions for balance training, reduce falls risk, and increase safety for discharge home. Problem List Decreased strength;Decreased endurance; Impaired balance;Decreased mobility; Decreased cognition; Impaired vision   Barriers to Discharge Decreased caregiver support   Plan   Treatment/Interventions Functional transfer training;LE strengthening/ROM; Elevations; Therapeutic exercise; Endurance training;Gait training   Progress Progressing toward goals   PT Therapy Minutes   PT Time In 0830   PT Time Out 0930   PT Total Time (minutes) 60   PT Mode of treatment - Individual (minutes) 60   PT Mode of treatment - Concurrent (minutes) 0   PT Mode of treatment - Group (minutes) 0   PT Mode of treatment - Co-treat (minutes) 0   PT Mode of Treatment - Total time(minutes) 60 minutes   PT Cumulative Minutes 210   Therapy Time missed   Time missed?  No

## 2023-09-24 LAB
GLUCOSE SERPL-MCNC: 116 MG/DL (ref 65–140)
GLUCOSE SERPL-MCNC: 199 MG/DL (ref 65–140)

## 2023-09-24 PROCEDURE — 97535 SELF CARE MNGMENT TRAINING: CPT

## 2023-09-24 PROCEDURE — 97116 GAIT TRAINING THERAPY: CPT

## 2023-09-24 PROCEDURE — 97112 NEUROMUSCULAR REEDUCATION: CPT

## 2023-09-24 PROCEDURE — 97129 THER IVNTJ 1ST 15 MIN: CPT

## 2023-09-24 PROCEDURE — 97530 THERAPEUTIC ACTIVITIES: CPT

## 2023-09-24 PROCEDURE — 97110 THERAPEUTIC EXERCISES: CPT

## 2023-09-24 PROCEDURE — 82948 REAGENT STRIP/BLOOD GLUCOSE: CPT

## 2023-09-24 RX ADMIN — Medication 250 MG: at 17:25

## 2023-09-24 RX ADMIN — CITALOPRAM HYDROBROMIDE 20 MG: 20 TABLET ORAL at 09:34

## 2023-09-24 RX ADMIN — POLYETHYLENE GLYCOL 3350 17 G: 17 POWDER, FOR SOLUTION ORAL at 09:35

## 2023-09-24 RX ADMIN — AMLODIPINE BESYLATE 5 MG: 5 TABLET ORAL at 09:34

## 2023-09-24 RX ADMIN — SODIUM BICARBONATE 650 MG TABLET 1300 MG: at 09:34

## 2023-09-24 RX ADMIN — HEPARIN SODIUM 5000 UNITS: 5000 INJECTION INTRAVENOUS; SUBCUTANEOUS at 21:46

## 2023-09-24 RX ADMIN — Medication 250 MG: at 09:34

## 2023-09-24 RX ADMIN — HEPARIN SODIUM 5000 UNITS: 5000 INJECTION INTRAVENOUS; SUBCUTANEOUS at 13:47

## 2023-09-24 RX ADMIN — HYDRALAZINE HYDROCHLORIDE 10 MG: 10 TABLET, FILM COATED ORAL at 09:35

## 2023-09-24 RX ADMIN — SENNOSIDES 8.6 MG: 8.6 TABLET, FILM COATED ORAL at 09:34

## 2023-09-24 RX ADMIN — PANTOPRAZOLE SODIUM 40 MG: 40 TABLET, DELAYED RELEASE ORAL at 06:28

## 2023-09-24 RX ADMIN — SENNOSIDES 8.6 MG: 8.6 TABLET, FILM COATED ORAL at 17:25

## 2023-09-24 RX ADMIN — MAGNESIUM OXIDE TAB 400 MG (241.3 MG ELEMENTAL MG) 400 MG: 400 (241.3 MG) TAB at 09:34

## 2023-09-24 RX ADMIN — LORATADINE 10 MG: 10 TABLET ORAL at 09:35

## 2023-09-24 RX ADMIN — HEPARIN SODIUM 5000 UNITS: 5000 INJECTION INTRAVENOUS; SUBCUTANEOUS at 06:28

## 2023-09-24 RX ADMIN — SODIUM BICARBONATE 650 MG TABLET 1300 MG: at 17:25

## 2023-09-24 RX ADMIN — MELATONIN TAB 3 MG 6 MG: 3 TAB at 21:46

## 2023-09-24 RX ADMIN — FOLIC ACID 1 MG: 1 TABLET ORAL at 09:34

## 2023-09-24 RX ADMIN — FLUTICASONE PROPIONATE 1 SPRAY: 50 SPRAY, METERED NASAL at 09:34

## 2023-09-24 RX ADMIN — OFLOXACIN 5 DROP: 3 SOLUTION OPHTHALMIC at 09:34

## 2023-09-24 RX ADMIN — ATORVASTATIN CALCIUM 40 MG: 40 TABLET, FILM COATED ORAL at 21:46

## 2023-09-24 RX ADMIN — HYDRALAZINE HYDROCHLORIDE 10 MG: 10 TABLET, FILM COATED ORAL at 21:46

## 2023-09-24 RX ADMIN — AMLODIPINE BESYLATE 5 MG: 5 TABLET ORAL at 21:46

## 2023-09-24 RX ADMIN — HYDRALAZINE HYDROCHLORIDE 10 MG: 10 TABLET, FILM COATED ORAL at 17:25

## 2023-09-24 RX ADMIN — OXYCODONE HYDROCHLORIDE AND ACETAMINOPHEN 500 MG: 500 TABLET ORAL at 09:35

## 2023-09-24 RX ADMIN — FERROUS GLUCONATE 324 MG: 324 TABLET ORAL at 06:28

## 2023-09-24 NOTE — PROGRESS NOTES
09/24/23 1430   Pain Assessment   Pain Assessment Tool 0-10   Pain Score No Pain   Subjective   Subjective pt agreeable to participate in PT tx. Wife present and observing. "I'm happy to see him moving"   Sit to Stand   Type of Assistance Needed Incidental touching;Verbal cues   Comment CGA without AD   Sit to Stand CARE Score 4   Walk 10 Feet   Type of Assistance Needed Incidental touching;Verbal cues; Adaptive equipment   Comment with and without AD   Walk 10 Feet CARE Score 4   Walk 50 Feet with Two Turns   Type of Assistance Needed Adaptive equipment;Verbal cues; Physical assistance   Physical Assistance Level 25% or less   Comment CG with RW, min A without AD   Walk 50 Feet with Two Turns CARE Score 3   Walk 150 Feet   Type of Assistance Needed Incidental touching;Verbal cues; Adaptive equipment   Comment RW   Walk 150 Feet CARE Score 4   Walking 10 Feet on Uneven Surfaces   Type of Assistance Needed Physical assistance;Verbal cues; Adaptive equipment   Physical Assistance Level 25% or less   Comment over floor mat x 2 trials with RW and x 2 trials without AD   Walking 10 Feet on Uneven Surfaces CARE Score 3   Ambulation   Distance Walked (feet) 150 ft  (x 2 with RW, 100 ft x 3 without AD)   Assist Device [employer]; Other  (and without AD)   Gait Pattern Inconsistant Ifrah; Wide ANNA; Improper weight shift   Limitations Noted In Balance; Endurance;Speed;Strength;Swing   Provided Assistance with: Balance;Weight Shift   Walk Assist Level Contact Guard;Minimum Assist   Findings CGA with RW, also trialed without AD with CGA/min A cues for improved BUE swing   Does the patient walk? 2.  Yes   12 Steps   Type of Assistance Needed Physical assistance;Verbal cues   Physical Assistance Level 25% or less   Comment four 6-inch  steps x 3 consecutive trials with BHRs , reciprocal pattern   12 Steps CARE Score 3   Therapeutic Interventions   Strengthening sit < > stands 3 sets of 5 without AD; standing heel raises and marches 3 sets of 10 with rest breaks provided PRN   Neuromuscular Re-Education ambulatory obstacle course without AD requiring pt to step over SPCs and QCs on floor, step onto silver [place] balance pad, and navigate around cones pt completed 6 trials with min/mod A . pt peformed chest presses x 10 reps, overhead raises x 10 reps, and then trunk rotations x 10 reps while holding therball and alternating RLE/LLE staggered stance, min A for balance   Equipment Use   NuStep level 1 x 10 minutes with brief rest break at 5 minutes due to reports of UE fatigue. SPM >60 throughout. Assessment   Treatment Assessment Pt participated in 60 minute PT tx session focusing on BLE strengthening exercises, balance and endurance activities, and functional mobility training. Pts wife present and observing session and reports happiness with pts mobility status. pt negotiated 12 stairs today with BHRs and min A, practiced ambulating with and without RW, and participated in higher level balance challenge activities. pt will continue to benefit from skilled PT interventions to address deficits, reduce fall risk, and ma[place]imzie functional independence. Did not utilzie abd binder during session because starting BP was 168/88; vitals at end of session 146/80. Family/Caregiver Present wife  (observing)   Problem List Decreased strength;Decreased endurance; Impaired balance;Decreased mobility; Impaired hearing; Impaired vision   Barriers to Discharge Inaccessible home environment   Plan   Treatment/Interventions Functional transfer training;LE strengthening/ROM; Elevations; Endurance training; Therapeutic exercise;Patient/family training;Bed mobility;Gait training   Progress Progressing toward goals   PT Therapy Minutes   PT Time In 1430   PT Time Out 1530   PT Total Time (minutes) 60   PT Mode of treatment - Individual (minutes) 60   PT Mode of treatment - Concurrent (minutes) 0   PT Mode of treatment - Group (minutes) 0   PT Mode of treatment - Co-treat (minutes) 0   PT Mode of Treatment - Total time(minutes) 60 minutes   PT Cumulative Minutes 270   Therapy Time missed   Time missed?  No

## 2023-09-24 NOTE — PROGRESS NOTES
09/24/23 1230   Pain Assessment   Pain Assessment Tool 0-10   Pain Score No Pain   Restrictions/Precautions   Precautions Fall Risk;Seizure;Supervision on toilet/commode   Braces or Orthoses   (LEAH and binder)   Sit to Stand   Type of Assistance Needed Supervision   Sit to Stand CARE Score 4   Bed-Chair Transfer   Type of Assistance Needed Physical assistance   Physical Assistance Level 26%-50%   Comment LOB x 1 without use of RW, completing therapeutic task A from OTR to correct. Pt all other transfers CS with use of RW   Chair/Bed-to-Chair Transfer CARE Score 3   Functional Standing Tolerance   Time 10 min x 3   Activity item retrieval and transport task to simulate item transport at countertop at home environment   Comments Pt completed item transport and retrieval task in stance at raised table to transport parquetry puzzle pieces from one side of table to another, with side stepping and sliding technique with unilateral UE support x 1 trial and remainder of trials with b/l UE release and CGA from OTR to steady. Upgraded task with distance and weight of objects. brief periods of seated rest breaks to manage fatigue   Assessment   Treatment Assessment Pt engaged in occupational therapy session with focus on fxnl standing tolerance/balance, fxnl endurance, compensatory technique education, and fxnl mob. see above for further details. Pt with slight LOB todays session req A to correct with b/l UE release however CG/CS throughout remainder of task. reports of slight "lightheadedness" with transfers, BPs remain stable ranging from 135/75 to 145/78 with rest and activity. ABd anthony and LEAH donned. continue with fxnl endurance, standing toelrance/balance, and safety education.    OT Therapy Minutes   OT Time In 1230   OT Time Out 1330   OT Total Time (minutes) 60   OT Mode of treatment - Individual (minutes) 60   OT Mode of treatment - Concurrent (minutes) 0   OT Mode of treatment - Group (minutes) 0   OT Mode of treatment - Co-treat (minutes) 0   OT Mode of Treatment - Total time(minutes) 60 minutes   OT Cumulative Minutes 495   Therapy Time missed   Time missed?  No Patent

## 2023-09-24 NOTE — PROGRESS NOTES
09/24/23 1000   Pain Assessment   Pain Assessment Tool 0-10   Pain Score No Pain   Restrictions/Precautions   Precautions Cognitive; Fall Risk;Seizure;Visual deficit  (h/o macular degeneration; illeal conduit to sweet bag; floyd BP's SBP goal 140)   Weight Bearing Restrictions No   ROM Restrictions No   Health Management   Health Management Level of Assistance Modified independent   Health Management Pt engaged in medication management simulation task with use of am/pm pill box and pt current medication list to assess pts ability to complete at indep level upon d/c. Pt states PTA he would in stance retrieve all medications and pill boxes and fill them once weekly to appropriate specifications. PT states due to his visual deficit, he has labeled some of his medications with larger font to determine AM/PM frequency. Pt able to verbalize appropriate routine and state his use of error recognition/self checking behaviors. OTR to assess provided pt with medication name, purpose and frequency verbally to inc processing and short term recall demand. Pt then required to fill pill box following directions provided. Pt able to do so with 100% accuracy, attentive to am/pm direction. OTR increased demand by asking pt to recall previously filled medications. Pt with 50% accuracy to medication name but able to states purpose 100% of the time (ie "we filled the one for my allergies"). This demand not utilized for functional assessment rather to test his short term recall. Pt advised upon d/c to sit with spouse initially to review all prescriptions to ensure pt with accurate understanding. Pt agreeable however OTR opinion that pt able to appropriately fill pill boxes. Pt self admitted to "not always remembering to take them". OTR provided edu on use of alarms on his phone daily to recall to take am and pm medications. Pt understanding recommendations.  Overall, pt able to appropriately fill and complete med management task at indep level, difficulties only with short term recall however that challenge was for therapeutic benefit, not a functional representation of demands. Assessment   Treatment Assessment Pt engaged in skilled OT tx session with focus on medciation/health management and pt education. OTR provided medication management simulation with pt's current list of medications. Pt able to appropriately manage and fill pill boxes and reports appropriate home strategies. OTR recommendeds only to review medications with spouse upon d/c to ensure accuracy, complete seated to compensate for balance deficits, and to set alarm to recall to take am/pm medications. OT Therapy Minutes   OT Time In 1000   OT Time Out 1100   OT Total Time (minutes) 60   OT Mode of treatment - Individual (minutes) 60   OT Mode of treatment - Concurrent (minutes) 0   OT Mode of treatment - Group (minutes) 0   OT Mode of treatment - Co-treat (minutes) 0   OT Mode of Treatment - Total time(minutes) 60 minutes   OT Cumulative Minutes 435   Therapy Time missed   Time missed?  No

## 2023-09-24 NOTE — PLAN OF CARE
Problem: RESPIRATORY - ADULT  Goal: Achieves optimal ventilation and oxygenation  Description: INTERVENTIONS:  - Assess for changes in respiratory status  - Assess for changes in mentation and behavior  - Position to facilitate oxygenation and minimize respiratory effort  - Oxygen administered by appropriate delivery if ordered  - Initiate smoking cessation education as indicated  - Encourage broncho-pulmonary hygiene including cough, deep breathe, Incentive Spirometry  - Assess the need for suctioning and aspirate as needed  - Assess and instruct to report SOB or any respiratory difficulty  - Respiratory Therapy support as indicated  Outcome: Progressing     Problem: GASTROINTESTINAL - ADULT  Goal: Maintains adequate nutritional intake  Description: INTERVENTIONS:  - Monitor percentage of each meal consumed  - Identify factors contributing to decreased intake, treat as appropriate  - Assist with meals as needed  - Monitor I&O, weight, and lab values if indicated  - Obtain nutrition services referral as needed  Outcome: Progressing     Problem: METABOLIC, FLUID AND ELECTROLYTES - ADULT  Goal: Glucose maintained within target range  Description: INTERVENTIONS:  - Monitor Blood Glucose as ordered  - Assess for signs and symptoms of hyperglycemia and hypoglycemia  - Administer ordered medications to maintain glucose within target range  - Assess nutritional intake and initiate nutrition service referral as needed  Outcome: Progressing

## 2023-09-24 NOTE — PLAN OF CARE
Problem: SAFETY ADULT  Goal: Patient will remain free of falls  Description: INTERVENTIONS:  - Educate patient/family on patient safety including physical limitations  - Instruct patient to call for assistance with activity   - Consult OT/PT to assist with strengthening/mobility   - Keep Call bell within reach  - Keep bed low and locked with side rails adjusted as appropriate  - Keep care items and personal belongings within reach  - Initiate and maintain comfort rounds  - Make Fall Risk Sign visible to staff  - Offer Toileting every 4 Hours, in advance of need  -   - Obtain necessary fall risk management equipment: rw  - Apply yellow socks and bracelet for high fall risk patients  - Consider moving patient to room near nurses station  Outcome: Progressing

## 2023-09-25 ENCOUNTER — TELEPHONE (OUTPATIENT)
Dept: HEMATOLOGY ONCOLOGY | Facility: CLINIC | Age: 83
End: 2023-09-25

## 2023-09-25 LAB
ANION GAP SERPL CALCULATED.3IONS-SCNC: 11 MMOL/L
BASOPHILS # BLD AUTO: 0.05 THOUSANDS/ÂΜL (ref 0–0.1)
BASOPHILS NFR BLD AUTO: 0 % (ref 0–1)
BUN SERPL-MCNC: 27 MG/DL (ref 5–25)
CALCIUM SERPL-MCNC: 8.7 MG/DL (ref 8.4–10.2)
CHLORIDE SERPL-SCNC: 102 MMOL/L (ref 96–108)
CO2 SERPL-SCNC: 22 MMOL/L (ref 21–32)
CREAT SERPL-MCNC: 2.05 MG/DL (ref 0.6–1.3)
EOSINOPHIL # BLD AUTO: 0.14 THOUSAND/ÂΜL (ref 0–0.61)
EOSINOPHIL NFR BLD AUTO: 1 % (ref 0–6)
ERYTHROCYTE [DISTWIDTH] IN BLOOD BY AUTOMATED COUNT: 17.6 % (ref 11.6–15.1)
GFR SERPL CREATININE-BSD FRML MDRD: 29 ML/MIN/1.73SQ M
GLUCOSE P FAST SERPL-MCNC: 142 MG/DL (ref 65–99)
GLUCOSE SERPL-MCNC: 139 MG/DL (ref 65–140)
GLUCOSE SERPL-MCNC: 142 MG/DL (ref 65–140)
GLUCOSE SERPL-MCNC: 194 MG/DL (ref 65–140)
HCT VFR BLD AUTO: 31.7 % (ref 36.5–49.3)
HGB BLD-MCNC: 10 G/DL (ref 12–17)
IMM GRANULOCYTES # BLD AUTO: 0.21 THOUSAND/UL (ref 0–0.2)
IMM GRANULOCYTES NFR BLD AUTO: 2 % (ref 0–2)
LYMPHOCYTES # BLD AUTO: 0.9 THOUSANDS/ÂΜL (ref 0.6–4.47)
LYMPHOCYTES NFR BLD AUTO: 7 % (ref 14–44)
MCH RBC QN AUTO: 29.2 PG (ref 26.8–34.3)
MCHC RBC AUTO-ENTMCNC: 31.5 G/DL (ref 31.4–37.4)
MCV RBC AUTO: 92 FL (ref 82–98)
MONOCYTES # BLD AUTO: 1.3 THOUSAND/ÂΜL (ref 0.17–1.22)
MONOCYTES NFR BLD AUTO: 10 % (ref 4–12)
NEUTROPHILS # BLD AUTO: 10.37 THOUSANDS/ÂΜL (ref 1.85–7.62)
NEUTS SEG NFR BLD AUTO: 80 % (ref 43–75)
NRBC BLD AUTO-RTO: 0 /100 WBCS
PLATELET # BLD AUTO: 223 THOUSANDS/UL (ref 149–390)
PMV BLD AUTO: 11.6 FL (ref 8.9–12.7)
POTASSIUM SERPL-SCNC: 3.6 MMOL/L (ref 3.5–5.3)
RBC # BLD AUTO: 3.43 MILLION/UL (ref 3.88–5.62)
SODIUM SERPL-SCNC: 135 MMOL/L (ref 135–147)
WBC # BLD AUTO: 12.97 THOUSAND/UL (ref 4.31–10.16)

## 2023-09-25 PROCEDURE — 82948 REAGENT STRIP/BLOOD GLUCOSE: CPT

## 2023-09-25 PROCEDURE — 97110 THERAPEUTIC EXERCISES: CPT

## 2023-09-25 PROCEDURE — 97530 THERAPEUTIC ACTIVITIES: CPT

## 2023-09-25 PROCEDURE — 85025 COMPLETE CBC W/AUTO DIFF WBC: CPT | Performed by: NURSE PRACTITIONER

## 2023-09-25 PROCEDURE — 99232 SBSQ HOSP IP/OBS MODERATE 35: CPT | Performed by: INTERNAL MEDICINE

## 2023-09-25 PROCEDURE — 97535 SELF CARE MNGMENT TRAINING: CPT

## 2023-09-25 PROCEDURE — 80048 BASIC METABOLIC PNL TOTAL CA: CPT | Performed by: NURSE PRACTITIONER

## 2023-09-25 PROCEDURE — 97112 NEUROMUSCULAR REEDUCATION: CPT

## 2023-09-25 PROCEDURE — 99232 SBSQ HOSP IP/OBS MODERATE 35: CPT

## 2023-09-25 PROCEDURE — 97129 THER IVNTJ 1ST 15 MIN: CPT

## 2023-09-25 RX ORDER — LORATADINE 10 MG/1
10 TABLET ORAL
Status: DISCONTINUED | OUTPATIENT
Start: 2023-09-26 | End: 2023-09-29 | Stop reason: HOSPADM

## 2023-09-25 RX ORDER — TRAZODONE HYDROCHLORIDE 50 MG/1
25 TABLET ORAL
Status: DISCONTINUED | OUTPATIENT
Start: 2023-09-25 | End: 2023-09-29 | Stop reason: HOSPADM

## 2023-09-25 RX ORDER — POTASSIUM CHLORIDE 20 MEQ/1
20 TABLET, EXTENDED RELEASE ORAL ONCE
Status: COMPLETED | OUTPATIENT
Start: 2023-09-25 | End: 2023-09-25

## 2023-09-25 RX ORDER — LANOLIN ALCOHOL/MO/W.PET/CERES
6 CREAM (GRAM) TOPICAL
Status: DISCONTINUED | OUTPATIENT
Start: 2023-09-25 | End: 2023-09-29 | Stop reason: HOSPADM

## 2023-09-25 RX ADMIN — SODIUM BICARBONATE 650 MG TABLET 1300 MG: at 08:26

## 2023-09-25 RX ADMIN — TRAZODONE HYDROCHLORIDE 25 MG: 50 TABLET ORAL at 21:27

## 2023-09-25 RX ADMIN — Medication 250 MG: at 17:25

## 2023-09-25 RX ADMIN — Medication 250 MG: at 08:26

## 2023-09-25 RX ADMIN — HEPARIN SODIUM 5000 UNITS: 5000 INJECTION INTRAVENOUS; SUBCUTANEOUS at 14:13

## 2023-09-25 RX ADMIN — LORATADINE 10 MG: 10 TABLET ORAL at 08:26

## 2023-09-25 RX ADMIN — HYDRALAZINE HYDROCHLORIDE 10 MG: 10 TABLET, FILM COATED ORAL at 17:25

## 2023-09-25 RX ADMIN — OFLOXACIN 5 DROP: 3 SOLUTION OPHTHALMIC at 08:26

## 2023-09-25 RX ADMIN — AMLODIPINE BESYLATE 5 MG: 5 TABLET ORAL at 08:26

## 2023-09-25 RX ADMIN — FLUTICASONE PROPIONATE 1 SPRAY: 50 SPRAY, METERED NASAL at 08:26

## 2023-09-25 RX ADMIN — HEPARIN SODIUM 5000 UNITS: 5000 INJECTION INTRAVENOUS; SUBCUTANEOUS at 05:33

## 2023-09-25 RX ADMIN — HYDRALAZINE HYDROCHLORIDE 10 MG: 10 TABLET, FILM COATED ORAL at 08:26

## 2023-09-25 RX ADMIN — CITALOPRAM HYDROBROMIDE 20 MG: 20 TABLET ORAL at 08:26

## 2023-09-25 RX ADMIN — TORSEMIDE 20 MG: 20 TABLET ORAL at 08:26

## 2023-09-25 RX ADMIN — ATORVASTATIN CALCIUM 40 MG: 40 TABLET, FILM COATED ORAL at 21:27

## 2023-09-25 RX ADMIN — OXYCODONE HYDROCHLORIDE AND ACETAMINOPHEN 500 MG: 500 TABLET ORAL at 08:26

## 2023-09-25 RX ADMIN — Medication 6 MG: at 21:27

## 2023-09-25 RX ADMIN — SENNOSIDES 8.6 MG: 8.6 TABLET, FILM COATED ORAL at 08:26

## 2023-09-25 RX ADMIN — HEPARIN SODIUM 5000 UNITS: 5000 INJECTION INTRAVENOUS; SUBCUTANEOUS at 21:27

## 2023-09-25 RX ADMIN — AMLODIPINE BESYLATE 5 MG: 5 TABLET ORAL at 21:27

## 2023-09-25 RX ADMIN — POLYETHYLENE GLYCOL 3350 17 G: 17 POWDER, FOR SOLUTION ORAL at 08:25

## 2023-09-25 RX ADMIN — SODIUM BICARBONATE 650 MG TABLET 1300 MG: at 17:25

## 2023-09-25 RX ADMIN — PANTOPRAZOLE SODIUM 40 MG: 40 TABLET, DELAYED RELEASE ORAL at 05:33

## 2023-09-25 RX ADMIN — FERROUS GLUCONATE 324 MG: 324 TABLET ORAL at 06:04

## 2023-09-25 RX ADMIN — FOLIC ACID 1 MG: 1 TABLET ORAL at 08:26

## 2023-09-25 RX ADMIN — SENNOSIDES 8.6 MG: 8.6 TABLET, FILM COATED ORAL at 17:25

## 2023-09-25 RX ADMIN — POTASSIUM CHLORIDE 20 MEQ: 1500 TABLET, EXTENDED RELEASE ORAL at 11:44

## 2023-09-25 RX ADMIN — MAGNESIUM OXIDE TAB 400 MG (241.3 MG ELEMENTAL MG) 400 MG: 400 (241.3 MG) TAB at 08:26

## 2023-09-25 RX ADMIN — HYDRALAZINE HYDROCHLORIDE 10 MG: 10 TABLET, FILM COATED ORAL at 21:28

## 2023-09-25 NOTE — PROGRESS NOTES
09/25/23 1230   Pain Assessment   Pain Assessment Tool 0-10   Pain Score No Pain   Restrictions/Precautions   Precautions Fall Risk;Seizure   Weight Bearing Restrictions No   ROM Restrictions No   Braces or Orthoses   (b/l knee high TEDs, no abdominal binder)   General   Change In Medical/Functional Status SBP goal 140-160; end of session pt reporting that per Dr. Sara Khan, pt does not need seizure pads on bed any longer. States that Dr. Sara Khan stated he would put an order in the computer. PT did check orders, however, no orders present to support pt's statement. PT did alert Justino Strauss of pt's report however she did not remove seizure precautions. Cognition   Overall Cognitive Status Impaired   Arousal/Participation Alert; Cooperative   Subjective   Subjective Pt reports he did not sleep at all last night stating "I just couldn't all asleep." still agreeable to PT session   Lying to Sitting on Side of Bed   Type of Assistance Needed Supervision   Lying to Sitting on Side of Bed CARE Score 4   Sit to Stand   Type of Assistance Needed Incidental touching   Comment CG w/ and W/O RW   Sit to Stand CARE Score 4   Bed-Chair Transfer   Type of Assistance Needed Incidental touching   Chair/Bed-to-Chair Transfer CARE Score 4   Walk 10 Feet   Type of Assistance Needed Incidental touching   Comment CG/CS RW, CG w/o AD   Walk 10 Feet CARE Score 4   Walk 50 Feet with Two Turns   Type of Assistance Needed Physical assistance   Physical Assistance Level 25% or less   Comment CG/CS RW, CG/Rani w/o AD   Walk 50 Feet with Two Turns CARE Score 3   Walk 150 Feet   Type of Assistance Needed Physical assistance   Physical Assistance Level 25% or less   Comment CG/Rani w/o AD   Walk 150 Feet CARE Score 3   Ambulation   Primary Mode of Locomotion Prior to Admission Walk   Distance Walked (feet) 150 ft  (x5, 100'x2, short distances in PT gym)   Assist Device Roller Walker  (vs no AD)   Gait Pattern Inconsistant Ifrah; Slow Santy;Decreased foot clearance; Improper weight shift   Limitations Noted In Balance; Endurance; Heel Strike;Speed;Strength   Provided Assistance with: Balance   Walk Assist Level Minimum Assist;Contact Guard   Findings CG/CS with use of RW this session to/from therapy gym. Remainder of session did not utilize RW, HHA vs hand on gait belt. Fluctuated from Rani-CGA for balance, cues to increase gait speed for safety and to improve foot clearance   Does the patient walk? 2. Yes   Wheel 50 Feet with Two Turns   Reason if not Attempted Activity not applicable   Wheel 50 Feet with Two Turns CARE Score 9   Wheel 150 Feet   Reason if not Attempted Activity not applicable   Wheel 979 Feet CARE Score 9   Curb or Single Stair   Style negotiated Single stair   Type of Assistance Needed Incidental touching   Comment CG up/down  steps, step thru, b/l HRs   1 Step (Curb) CARE Score 4   4 Steps   Type of Assistance Needed Incidental touching   Comment CG up/down  steps, step thru, b/l HRs   4 Steps CARE Score 4   12 Steps   Type of Assistance Needed Incidental touching   Comment CG up/down  steps, step thru, b/l HRs   12 Steps CARE Score 4   Stairs   Type Stairs   # of Steps 12   Weight Bearing Precautions Fall Risk   Assist Devices Bilateral Rail   Findings performed for strengthening/conditioning but pt does have 1+1 NANO the home   Therapeutic Interventions   Flexibility seated b/l hamstring/gastroc stretch 3x30 sec hold   Neuromuscular Re-Education ambulation at faster pace w/ L 'x2, alt step taps at 6"  steps with 1 UE support 2x10, 1 UE fingertip support 2x10, no UE support 2x10 (modAx1 for balance). 150' ambulation holding cup filled with water, Rani/CGA; 6x10' obstacle course step thru (QCs, SPCs) min-modAx1 for balance; 4x10' obstacle course sidestepping minAx1 for balance. 150' ambulation with balloon kicks minAx1 for balance.  100'x1 ambulation with varied santy and direction as called out by PT minAx1 for balance. 150' ambulation holding ball with b/l UEs CG/minAx1   Equipment Use   NuStep lvl 2, 10 mins, for conditioning   Other Comments   Comments manual BP start of session w/o abdominal binder, with b/l LE TEDs 152/68   Assessment   Treatment Assessment Pt engaged in 90 min skilled PT session with focus on balance interventions to reduce risk for falls upon d/c, gait trng w/ and w/o RW, stair navigation and transfer trng. Despite fatigue pt able to perform all interventions with therapeutic rest breaks PRN. Requires Rani-modAx1 w/o use of AD at this time due to periods of imbalance due to decreased gait speed and decreased foot clearance. Pt is open to using RW if needed upon d/c but reports ultimate goal is to d/c w/o AD if possible. Would recommend continuing with static and dynamic balance interventions to improve balance and reduce risk for falls. When performing tasks w/o AD noted pt reaches for HHA which was initially provided to him by PT however progressed to hands on hips only for added challenge. At this time barriers to d/c home include 1+1 NANO home in which pt reports grab bars to be installed, need for external assistance due to imbalance and decreased endurance, as well as limited family support. Cont with POC to address barriers to d/c home and allow pt to maximize his independence with all mobilities to reduce risk for falls upon d/c. Family/Caregiver Present no   Problem List Decreased strength;Decreased range of motion;Decreased endurance; Impaired balance;Decreased mobility; Decreased cognition; Impaired vision   Barriers to Discharge Inaccessible home environment   PT Barriers   Functional Limitation Walking;Transfers;Stair negotiation;Car transfers   Plan   Treatment/Interventions Functional transfer training;LE strengthening/ROM; Elevations; Therapeutic exercise; Endurance training;Bed mobility;Gait training   Progress Progressing toward goals   Recommendation   PT Discharge Recommendation Home with home health rehabilitation   Equipment Recommended Walker   PT Therapy Minutes   PT Time In 1230   PT Time Out 1400   PT Total Time (minutes) 90   PT Mode of treatment - Individual (minutes) 90   PT Mode of treatment - Concurrent (minutes) 0   PT Mode of treatment - Group (minutes) 0   PT Mode of treatment - Co-treat (minutes) 0   PT Mode of Treatment - Total time(minutes) 90 minutes   PT Cumulative Minutes 360   Therapy Time missed   Time missed?  No

## 2023-09-25 NOTE — ASSESSMENT & PLAN NOTE
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 Underwent radical cystoprostatectomy with ileal conduit at [facility]. Recurrence in 2022 and was started chemotherapy. Currently receiving Avelunab and aranesp for 3 months. Follows with Dr. Yuniel Littlejohn (Hem/Onc) as outpatient - due for appt on 9/26. Ordered PET scan for 9/19 but currently inpatient. CT chest/abd/pelvis in acute setting showed multiple concerns for metastasis. Follow-up with Hem/Onc as outpatient.

## 2023-09-25 NOTE — ASSESSMENT & PLAN NOTE
Lab Results   Component Value Date    HGBA1C 6.3 06/05/2023       Recent Labs     09/23/23  1631 09/24/23  0600 09/24/23  1623 09/25/23  0605   POCGLU 155* 116 199* 139       Blood Sugar Average: Last 72 hrs:  (P) [id number]     Currently diet controlled at home. A1c 6.3 on 6/5/2023. Luigi Orr at home for renal protection. Continue BID accuchecks and cons. carb diet.

## 2023-09-25 NOTE — PLAN OF CARE
Problem: NEUROSENSORY - ADULT  Goal: Achieves stable or improved neurological status  Description: INTERVENTIONS  - Monitor and report changes in neurological status  - Monitor vital signs such as temperature, blood pressure, glucose, and any other labs ordered   - Initiate measures to prevent increased intracranial pressure  - Monitor for seizure activity and implement precautions if appropriate      Outcome: Progressing  Goal: Remains free of injury related to seizures activity  Description: INTERVENTIONS  - Maintain airway, patient safety  and administer oxygen as ordered  - Monitor patient for seizure activity, document and report duration and description of seizure to physician/advanced practitioner  - If seizure occurs,  ensure patient safety during seizure  - Reorient patient post seizure  - Seizure pads on all 4 side rails  - Instruct patient/family to notify RN of any seizure activity including if an aura is experienced  - Instruct patient/family to call for assistance with activity based on nursing assessment  - Administer anti-seizure medications if ordered    Outcome: Progressing     Problem: METABOLIC, FLUID AND ELECTROLYTES - ADULT  Goal: Electrolytes maintained within normal limits  Description: INTERVENTIONS:  - Monitor labs and assess patient for signs and symptoms of electrolyte imbalances  - Administer electrolyte replacement as ordered  - Monitor response to electrolyte replacements, including repeat lab results as appropriate  - Instruct patient on fluid and nutrition as appropriate  Outcome: Progressing

## 2023-09-25 NOTE — ASSESSMENT & PLAN NOTE
Worsening R sided headache on 9/21 along with hypertension. [facility] on 9/21 showed interval increasing CSF density right subdural hygoma with increasing mass effect on the sulci. Trace midline shift to the left. Stable hyperdense hemorrhages seen in the right subarachnoid/subdural along the right frontal and right temporal regions. No new area of acute hemorrhage. Stable left frontal interior contusions. Stable ventricular size and stable volume of the hyperdense layering blood products in the occipital horns. Neurosurgery recommending f/u CTH in 1 week. Discuss embo at that time. Neurovascular checks Q shift. Repeat CTH with any acute changes.

## 2023-09-25 NOTE — ASSESSMENT & PLAN NOTE
S/p fall on 9/15. CTH showed hematoma in the L parietal vertex and L occipital region, non-displaced fracture through the R temporal bone, linear non-displaced fracture through the posterior wall of the condyle with middle ear hemorrhage, fracture passes through the hypotympanum, non-displaced fracture through the R occipital bone. ENT consulted in acute setting - follow-up 1 week after discharge for audiogram.  No surgical intervention needed at this time. Completed Unasyn for 7 day course for open fracture on 9/22. Continue Floxin drops to R ear for 10 days total (complete on 9/25). Consider CTA/CTV as needed if concerns for sinus thrombosis given pneumocephalus within the sinus near the area of the fracture. Had not been done inpatient due to concerns of frequent contrast administration with CKD stage 4.

## 2023-09-25 NOTE — ASSESSMENT & PLAN NOTE
Experienced a-flutter in acute setting. ECHO on 9/16 showed EF 50-55%, abnormal diastolic inflow due to atrial flutter, L atrium moderately dilated, and R atrium mildly dilated. Cardiology consulted and following. No need for repeat ECHO inpatient. Will obtain as OP if patient continues to be in a-flutter. Follows with Dr. Miki Marinelli (Cardiology) as outpatient.

## 2023-09-25 NOTE — OCCUPATIONAL THERAPY NOTE
Pt's wife Mitzi Baird) present in pt's room. This SALAZAR introduced self and explained role during pt's stay. Discussed scheduling OT/PT FT for later this week. Mitzi Baird is agreeable. Communicated plan to check her calendar at home and confirm FT date. OT to confirm during tomorrow's Therapy session.       Julienne Jeff SALAZAR/L

## 2023-09-25 NOTE — ASSESSMENT & PLAN NOTE
Hgb currently 10. Had been receiving ferrous sulfate as OP. Vitamin B12 1,238 on 9/20. Iron panel completed on 9/20 and showed iron sat 14%, TIBC 215, and iron 30. Ferritin 131. Folate 8. Started on folic acid 1mg daily, ferrous gluconate 324mg daily, and vitamin C 500mg daily on 9/21. Currently asymptomatic. Continue to trend routine CBC.

## 2023-09-25 NOTE — PROGRESS NOTES
[address] [facility]  Progress Note  Name: Lavinia Shelton  MRN: [id number]  Unit/Bed#: -01 I Date of Admission: 9/19/2023   Date of Service: 9/25/2023 I Hospital Day: 6    Assessment/Plan   Snoring  Assessment & Plan  Wife has noted that patient snores at HS. Obtaining overnight noc ox on 9/22 to determine if patient has sleep apnea. Study showed desat for less than 2 minutes with desat as low as 81%. No need for O2 on discharge. Acute headache  Assessment & Plan  Worsening R sided headache on 9/21 along with hypertension. 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 Trace midline shift to the left. Stable hyperdense hemorrhages seen in the right subarachnoid/subdural along the right frontal and right temporal regions. No new area of acute hemorrhage. Stable left frontal interior contusions. Stable ventricular size and stable volume of the hyperdense layering blood products in the occipital horns. Neurosurgery recommending f/u CTH in 1 week. Discuss embo at that time. Neurovascular checks Q shift. Repeat CTH with any acute changes. Insomnia  Assessment & Plan  Difficulty sleeping in the hospital setting. Currently on melatonin 6mg at HS. Started on trazodone 50mg at HS per Primary team tonight. Obtain sleep logs. Sinus congestion  Assessment & Plan  Chronic. Uses over the counter nasal sprays and antihistamines. Started on Claritin and Flonase on 9/20. Encouraged to follow-up with ENT as outpatient. GERD (gastroesophageal reflux disease)  Assessment & Plan  Continue Protonix 40mg daily as substitute for non-formulary omeprazole. Started on PRN Tums due to complaints of indigestion. Leukocytosis  Assessment & Plan  WBC count currently 12.97 from 11.99. Chronically elevated as outpatient. Currently no active s/s of infection. Received Unasyn due to open fracture.   UA obtained on 9/19 - negative for infection. Continue to trend routine CBC. Atrial flutter [facility])  Assessment & Plan  Noted to be in a-flutter in acute setting. ECHO obtained on 9/16: EF 50-55%, abnormal diastolic inflow due to atrial flutter, L atrium moderately dilated, and R atrium mildly dilated. Monitor routine VS.  Replete electrolytes as needed. Continue with magnesium oxide 400mg daily. Last EKG showed QTc of 526. Repeat EKG on 9/20 showed QTc of 499. Metoprolol discontinued. Currently not taking anticoagulation - [id number] score of 8. May benefit from anticoagulation - consider starting after follow-up with Neurosurgery. Follows with Dr. Mich Cunningham (Cardiology) as outpatient. Repeat EKG on 9/20 shows that patient is still in atrial flutter. Cardiology consulted. Hold AV node blockers. Consider anticoagulation once stable per Neurosurgery perspective. Follow-up with Cardiology as outpatient. No need for repeat ECHO at this time. Abnormal findings on imaging test  Assessment & Plan  CTA chest/abd/pelvis on 9/15 with multiple concerning lymph nodes/lesions for metastasis including: nodule at the L obturator internus muscle, L sided mesenteric lymph nodes, R external iliac lymph node, R sided retroperitoneal lymph nodes, hypodense structure along with R pelvic sidewall, lesions in the R inferior hepatic lobe, peritoneal soft tissue nodularity, R cardiophrenic lymph node, 2 enlarged retrocrural lymph nodes, and R middle lobe nodule. Follow-up with Hematology/Oncology as outpatient. History of stroke  Assessment & Plan  CTH on 9/16 showed chronic appearing R caudate lacunar infarct. Currently on Lipitor 40mg daily. Would benefit from starting antiplatelet - would need to discuss with Neurosurgery after follow-up in 2 weeks. Continue with PT/OT. Pneumocephalus  Assessment & Plan  Pneumocephalus within the sinus near the area of the fracture seen on CT.   Consider obtaining CTA/CTV if concerns for sinus thrombosis. SDH (subdural hematoma) ([facility])  Assessment & Plan  S/p fall on 9/15. [address] on 9/15 showed small foci of subdural hemorrhage in the R insular region and temporal region without significant mass effect. Repeat CTH on 9/17 stable. Changes noted on repeat CTH on 9/21. Neurosurgery follow-up this week. Currently holding AC/AP. Has been restarted on DVT ppx per Neurosurgery. Neurovascular checks Q shift. Maintain SBP <160. Completed Keppra for 7 days for seizure ppx. Follow-up with Neurosurgery in 2 weeks with repeat CTH (9/29). Primary team following. PT/OT. Fracture of temporal bone [facility])  Assessment & Plan  S/p fall on 9/15. CTH showed hematoma in the L parietal vertex and L occipital region, non-displaced fracture through the R temporal bone, linear non-displaced fracture through the posterior wall of the condyle with middle ear hemorrhage, fracture passes through the hypotympanum, non-displaced fracture through the R occipital bone. ENT consulted in acute setting - follow-up 1 week after discharge for audiogram.  No surgical intervention needed at this time. Completed Unasyn for 7 day course for open fracture on 9/22. Continue Floxin drops to R ear for 10 days total (complete on 9/25). Consider CTA/CTV as needed if concerns for sinus thrombosis given pneumocephalus within the sinus near the area of the fracture. Had not been done inpatient due to concerns of frequent contrast administration with CKD stage 4. Anemia due to stage 4 chronic kidney disease (HCC)  Assessment & Plan  Hgb currently 10. Had been receiving ferrous sulfate as OP. Vitamin B12 1,238 on 9/20. Iron panel completed on 9/20 and showed iron sat 14%, TIBC 215, and iron 30. Ferritin 131. Folate 8. Started on folic acid 1mg daily, ferrous gluconate 324mg daily, and vitamin C 500mg daily on 9/21. Currently asymptomatic. Continue to trend routine CBC.     Stage 4 chronic kidney disease [facility])  Assessment & Plan  Lab Results   Component Value Date    EGFR 29 09/25/2023    EGFR 29 09/20/2023    EGFR 25 09/19/2023    CREATININE 2.05 (H) 09/25/2023    CREATININE 2.04 (H) 09/20/2023    CREATININE 2.29 (H) 09/19/2023     Creatinine currently 2.05. Baseline creatinine 2.5-2.8. Avoid nephrotoxins as able - losartan currently on hold. Receiving torsemide 20mg EOD. Ensure adequate hydration. Currently being seen by Vascular for possible fistula creation as OP - on hold due to cancer treatment. Continue sodium bicarbonate 1300mg BID. Follows with Nephrology as outpatient - Dr. Elkin Galindo.    Benign hypertension with chronic kidney disease, stage IV [facility])  Assessment & Plan  Home regimen: losartan 25mg daily, metoprolol tartrate 25mg Q12, and torsemide 20mg EOD. Currently receiving amlodipine 5mg daily, torsemide 20mg EOD, and hydralazine 10mg TID. Metoprolol currently on hold due to prolonged QTc and bradycardia. Maintain SBP <160 due to recent intracranial bleed and >140 due to renal function. Continue to monitor BP with routine VS.  Follows with Dr. Stella Rivera (Cardiology) and Dr. Elkin Galindo (Nephrology) as outpatient. Anxiety and depression  Assessment & Plan  Continue home Celexa 20mg daily. Supportive counseling as needed. Consider Neuropsych consult as needed. Ischemic cardiomyopathy  Assessment & Plan  Experienced a-flutter in acute setting. ECHO on 9/16 showed EF 50-55%, abnormal diastolic inflow due to atrial flutter, L atrium moderately dilated, and R atrium mildly dilated. Cardiology consulted and following. No need for repeat ECHO inpatient. Will obtain as OP if patient continues to be in a-flutter. Follows with Dr. Stella Rivera (Cardiology) as outpatient. Coronary artery disease involving native coronary artery of native heart without angina pectoris  Assessment & Plan  Hx of CABG. Continue Lipitor 40mg daily.   Metoprolol on hold currently due to bradycardia and prolonged QTc.  Does not take ASA as outpatient - may benefit due to hx of CAD and possible stroke seen on CT. Recommend discussing AP/AC use at 2 week follow-up with Neurosurgery. Elevated troponins in acute setting - felt to be type II MI in setting of trauma. 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 Underwent radical cystoprostatectomy with ileal conduit at [facility]. Recurrence in 2022 and was started chemotherapy. Currently receiving Avelunab and aranesp for 3 months. Follows with Dr. Narcisa Bob (Hem/Onc) as outpatient - due for appt on 9/26. Ordered PET scan for 9/19 but currently inpatient. CT chest/abd/pelvis in acute setting showed multiple concerns for metastasis. Follow-up with Hem/Onc as outpatient. Hyperlipidemia  Assessment & Plan  Continue home Lipitor 40mg daily. DMII (diabetes mellitus, type 2) [facility])  Assessment & Plan  Lab Results   Component Value Date    HGBA1C 6.3 06/05/2023       Recent Labs     09/23/23  1631 09/24/23  0600 09/24/23  1623 09/25/23  0605   POCGLU 155* 116 199* 139       Blood Sugar Average: Last 72 hrs:  (P) [id number]     Currently diet controlled at home. A1c 6.3 on 6/5/2023. Geoffrey Sous at home for renal protection. Continue BID accuchecks and cons. carb diet. * SAH (subarachnoid hemorrhage) ([address])  Assessment & Plan  S/p fall on 9/15. [address] on 9/15 showed multicompartmental hemorrhage with small foci of subarachnoid hemorrhage in the bilateral inferior frontal region and additional small foci of subarachnoid hemorrhage. Repeat CTH on 9/17 stable. Changes noted on repeat CTH on 9/21. Neurosurgery follow-up this week. Hold AC/AP. Has been restarted on DVT ppx per Neurosurgery. Neurovascular checks Q shift. Maintain SBP <160. Completed Keppra for 7 days for seizure ppx. Follow-up with Neurovascular in 2 weeks with repeat CTH (9/29).     Primary team following. PT/OT. VTE Pharmacologic Prophylaxis:   Pharmacologic: Heparin  Mechanical VTE Prophylaxis in Place: Yes - sequential compression devices. Current Length of Stay: 6 day(s)    Current Patient Status: Inpatient Rehab     Discharge Plan: As per primary team.    Code Status: Level 1 - Full Code    Subjective:   Pt examined while pt lying in bed in pt room. Had a good weekend and denies any events over the weekend. BP has been stable over the past few days. Denies any headaches, lightheadedness, dizziness, or R sided facial pressure. Occasionally can hear his heartbeat in the R ear which he finds bothersome. Denies any palpitations but states that he can hear his irregular rhythm at times. Currently sounds regular on exam.  Denies any chest pain. Did not sleep well last night. Started on trazodone per Primary team.  Denies any abdominal pain or cramping but has had 3 BMs today. Denies any diarrhea but is concerned that it could develop. Will change Miralax to PRN at this time. Has no other concerns or complaints at this time. Objective:     Vitals:   Temp (24hrs), Av.5 °F (36.9 °C), Min:98 °F (36.7 °C), Max:99 °F (37.2 °C)    Temp:  [98 °F (36.7 °C)-99 °F (37.2 °C)] 98 °F (36.7 °C)  HR:  [66-73] 73  Resp:  [18-19] 18  BP: (140-150)/(64-80) 146/70  SpO2:  [97 %] 97 %  Body mass index is 28.97 kg/m². Review of Systems   Constitutional: Negative for appetite change, chills, fatigue and fever. HENT: Negative for ear discharge, ear pain, sinus pressure, sinus pain and trouble swallowing. Eyes: Negative for visual disturbance. Respiratory: Negative for cough, shortness of breath, wheezing and stridor. Cardiovascular: Negative for chest pain, palpitations and leg swelling. Gastrointestinal: Negative for abdominal distention, abdominal pain, constipation, diarrhea, nausea and vomiting. LBM 9/25 x3. Denies any diarrhea or abdominal cramping.   Asking to cut back on stool softeners. Genitourinary: Positive for difficulty urinating (chronic ileal conduit, denies any decreased urine production). Musculoskeletal: Negative for arthralgias, back pain and gait problem. Neurological: Negative for dizziness, weakness, light-headedness, numbness and headaches. Psychiatric/Behavioral: Positive for sleep disturbance (difficulty sleeping at night, states that he toss and turns multiple times overnight). Negative for dysphoric mood. The patient is not nervous/anxious. All other systems reviewed and are negative. Input and Output Summary (last 24 hours): Intake/Output Summary (Last 24 hours) at 9/25/2023 1235  Last data filed at 9/25/2023 0856  Gross per 24 hour   Intake 840 ml   Output 1325 ml   Net -485 ml       Physical Exam:     Physical Exam  Vitals and nursing note reviewed. Constitutional:       General: He is not in acute distress. Appearance: Normal appearance. He is not ill-appearing. HENT:      Head: Normocephalic and atraumatic. Cardiovascular:      Rate and Rhythm: Normal rate and regular rhythm. Pulses: Normal pulses. Heart sounds: Normal heart sounds. No murmur heard. No friction rub. Pulmonary:      Effort: Pulmonary effort is normal. No respiratory distress. Breath sounds: Normal breath sounds. No wheezing or rhonchi. Abdominal:      General: Abdomen is flat. Bowel sounds are normal. There is no distension. Palpations: Abdomen is soft. There is no mass. Tenderness: There is no abdominal tenderness. There is no guarding or rebound. Hernia: No hernia is present. Genitourinary:     Comments: Ileal conduit intact, draining clear, yellow urine. Musculoskeletal:      Cervical back: Normal range of motion and neck supple. No tenderness. Right lower leg: No edema. Left lower leg: No edema. Skin:     General: Skin is warm and dry. Capillary Refill: Capillary refill takes less than 2 seconds. Findings: Bruising (Periorbital ecchymosis - improving) present. Neurological:      Mental Status: He is alert and oriented to person, place, and time.    Psychiatric:         Mood and Affect: Mood normal.         Behavior: Behavior normal.         Additional Data:     Labs:    Results from last 7 days   Lab Units 09/25/23  0523   WBC Thousand/uL 12.97*   HEMOGLOBIN g/dL 10.0*   HEMATOCRIT % 31.7*   PLATELETS Thousands/uL 223   NEUTROS PCT % 80*   LYMPHS PCT % 7*   MONOS PCT % 10   EOS PCT % 1     Results from last 7 days   Lab Units 09/25/23  0523 09/20/23  0614   SODIUM mmol/L 135 136   POTASSIUM mmol/L 3.6 3.7   CHLORIDE mmol/L 102 104   CO2 mmol/L 22 20*   BUN mg/dL 27* 30*   CREATININE mg/dL 2.05* 2.04*   ANION GAP mmol/L 11 12   CALCIUM mg/dL 8.7 8.5   ALBUMIN g/dL  --  3.5   TOTAL BILIRUBIN mg/dL  --  0.54   ALK PHOS U/L  --  74   ALT U/L  --  8   AST U/L  --  10*   GLUCOSE RANDOM mg/dL 142* 142*         Results from last 7 days   Lab Units 09/25/23  0605 09/24/23  1623 09/24/23  0600 09/23/23  1631 09/23/23  0607 09/22/23  1626 09/22/23  0626 09/21/23  1634 09/21/23  0654 09/20/23  2042 09/20/23  1622 09/20/23  1142   POC GLUCOSE mg/dl 139 199* 116 155* 120 125 115 118 130 146* 142* 167*               Labs reviewed    Imaging:    Imaging reviewed    Recent Cultures (last 7 days):           Last 24 Hours Medication List:   Current Facility-Administered Medications   Medication Dose Route Frequency Provider Last Rate   • acetaminophen  650 mg Oral Q6H PRN John Montes MD     • amLODIPine  5 mg Oral BID LUKE Ordaz     • ascorbic acid  500 mg Oral Daily LUKE Ordaz     • atorvastatin  40 mg Oral HS John Montes MD     • bisacodyl  10 mg Rectal Daily PRN John Montes MD     • calcium carbonate  500 mg Oral Daily PRN LUKE Ordaz     • citalopram  20 mg Oral Daily John Montes MD     • ferrous gluconate  324 mg Oral Daily Before [address], CRNP     • fluticasone  1 spray Each Nare Daily LUKE Bowen     • folic acid  1 mg Oral Daily SHANNON BowenNP     • heparin (porcine)  5,000 Units Subcutaneous [facility] Javad Rock MD     • hydrALAZINE  10 mg Oral Q8H PRN SHANNON BowenNP     • hydrALAZINE  10 mg Oral TID LUKE Bowen     • [START ON 9/26/2023] loratadine  10 mg Oral HS SHANNON BowenNP     • magnesium Oxide  400 mg Oral Daily SHANNON BowenNP     • melatonin  6 mg Oral HS Javad Rock MD     • ofloxacin  5 drop Otic Daily Javad Rock MD     • oxyCODONE  2.5 mg Oral Q8H PRN Javad Rock MD     • pantoprazole  40 mg Oral Early Morning Javad Rock MD     • polyethylene glycol  17 g Oral Daily PRN Javad Rock MD     • polyethylene glycol  17 g Oral Daily Javad Rock MD     • saccharomyces boulardii  250 mg Oral BID LUKE Bowen     • senna  1 tablet Oral BID Javad Rock MD     • sodium bicarbonate  1,300 mg Oral BID after meals Javad Rock MD     • torsemide  20 mg Oral Every Other Day Javad Rock MD     • traZODone  25 mg Oral HS Javad Rock MD     • trimethobenzamide  200 mg Intramuscular Q6H PRN LUKE Bowen          M*Modal software was used to dictate this note. It may contain errors with dictating incorrect words or incorrect spelling. Please contact the provider directly with any questions.

## 2023-09-25 NOTE — NURSING NOTE
Pt told this nurse this am that he didn't sleep last night. Told to the pt that this nurse gave his Melatonin. This nurse told him why he didn't call us and let us know. Every time this nurse do the hourly rounds, the pt's eye is closed. Endorsed to the next shift.

## 2023-09-25 NOTE — ASSESSMENT & PLAN NOTE
S/p fall on 9/15. [facility] on 9/15 showed small foci of subdural hemorrhage in the R insular region and temporal region without significant mass effect. Repeat CTH on 9/17 stable. Changes noted on repeat CTH on 9/21. Neurosurgery follow-up this week. Currently holding AC/AP. Has been restarted on DVT ppx per Neurosurgery. Neurovascular checks Q shift. Maintain SBP <160. Completed Keppra for 7 days for seizure ppx. Follow-up with Neurosurgery in 2 weeks with repeat CTH (9/29). Primary team following. PT/OT.

## 2023-09-25 NOTE — TELEPHONE ENCOUNTER
Appointment Change  Cancel, Reschedule, Change to Virtual      Who are you speaking with? wife   If it is not the patient, is the caller listed on the communication consent form? yes   Which provider is the appointment scheduled with? [facility]   When was the original appointment scheduled? Please list date and time 9/26 3pm   At which location is the appointment scheduled to take place? [facility]   Was the appointment rescheduled? Was the appointment changed from an in person visit to a virtual visit? If so, please list the details of the change. No, will call   What is the reason for the appointment change? Patient hospitalized       Was STAR transport scheduled? no   Does STAR transport need to be scheduled for the new visit (if applicable) no   Does the patient need an infusion appointment rescheduled? no   Does the patient have an upcoming infusion appointment scheduled? If so, when? 10/5 1pm   Is the patient undergoing chemotherapy? yes   For appointments cancelled with less than 24 hours:  Was the no-show policy reviewed?  yes

## 2023-09-25 NOTE — ASSESSMENT & PLAN NOTE
Wife has noted that patient snores at HS. Obtaining overnight noc ox on 9/22 to determine if patient has sleep apnea. Study showed desat for less than 2 minutes with desat as low as 81%. No need for O2 on discharge.

## 2023-09-25 NOTE — PLAN OF CARE
Problem: GENITOURINARY - ADULT  Goal: Maintains or returns to baseline urinary function  Description: INTERVENTIONS:  - Assess urinary function  - Encourage oral fluids to ensure adequate hydration if ordered  - Administer IV fluids as ordered to ensure adequate hydration  - Administer ordered medications as needed  - Offer frequent toileting  - Follow urinary retention protocol if ordered  Outcome: Progressing     Problem: MUSCULOSKELETAL - ADULT  Goal: Maintain proper alignment of affected body part  Description: INTERVENTIONS:  - Support, maintain and protect limb and body alignment  - Provide patient/ family with appropriate education  Outcome: Progressing     Problem: SAFETY ADULT  Goal: Patient will remain free of falls  Description: INTERVENTIONS:  - Educate patient/family on patient safety including physical limitations  - Instruct patient to call for assistance with activity   - Consult OT/PT to assist with strengthening/mobility   - Keep Call bell within reach  - Keep bed low and locked with side rails adjusted as appropriate  - Keep care items and personal belongings within reach  - Initiate and maintain comfort rounds  - Make Fall Risk Sign visible to staff  - Offer Toileting every 2 Hours, in advance of need  - Obtain necessary fall risk management equipment: RW  - Apply yellow socks and bracelet for high fall risk patients  - Consider moving patient to room near nurses station  Outcome: Progressing     Problem: DISCHARGE PLANNING  Goal: Discharge to home or other facility with appropriate resources  Description: INTERVENTIONS:  - Identify barriers to discharge w/patient and caregiver  - Arrange for needed discharge resources and transportation as appropriate  - Identify discharge learning needs (meds, wound care, etc.)  - Arrange for interpretive services to assist at discharge as needed  - Refer to Case Management Department for coordinating discharge planning if the patient needs post-hospital services based on physician/advanced practitioner order or complex needs related to functional status, cognitive ability, or social support system  Outcome: Progressing

## 2023-09-25 NOTE — PROGRESS NOTES
09/25/23 0830   Pain Assessment   Pain Assessment Tool 0-10   Pain Score No Pain   Restrictions/Precautions   Precautions Fall Risk;Seizure   Weight Bearing Restrictions No   ROM Restrictions No   Braces or Orthoses Other (Comment)  (Teds - did not utilize ABD binder this session.)   Lifestyle   Autonomy "I didn't sleep last night."   Oral Hygiene   Type of Assistance Needed Set-up / clean-up   Physical Assistance Level No physical assistance   Comment S/U seated at sink   Oral Hygiene CARE Score 5   Grooming   Able To Initiate Tasks; Shave;Comb/Brush Hair   Findings Seated at sink to perform grooming routine - Rani with shaving utilizing electric shaver   Shower/Bathe Self   Type of Assistance Needed Incidental touching   Physical Assistance Level No physical assistance   Comment Pt preference to complete SB this session with pt demonstrating ability to bathe 10/10 parts with increased time. Xleg technique to bathe LE's to feet. CGA in stance at sink top to bathe farzana and buttocks area. Shower/Bathe Self CARE Score 4   Bathing   Assessed Bath Style Sponge Bath   Anticipated D/C Bath Style Shower;Sponge Bath   Able to Wash/Rinse/Dry (body part) Left Arm;Right Arm;L Upper Leg;R Upper Leg;L Lower Leg/Foot;R Lower Leg/Foot;Chest;Abdomen;Perineal Area; Buttocks   Limitations Noted in Balance; Endurance;ROM;Strength   Positioning Seated;Standing   Findings  Pt preference to complete SB this session, reports showering every other day and SB on days between PTA. Tub/Shower Transfer   Reason Not Assessed Sponge Bath;Patient refusal   Upper Body Dressing   Type of Assistance Needed Set-up / clean-up   Physical Assistance Level No physical assistance   Comment Seated to doff/don [place] shirt   Upper Body Dressing CARE Score 5   Lower Body Dressing   Type of Assistance Needed Incidental touching   Physical Assistance Level No physical assistance   Comment Trialed xleg technique to thread LE's into under garment;  Pt able to drape pants and manage LE's into pants; CGA in stance for CM. Lower Body Dressing CARE Score 4   Putting On/Taking Off Footwear   Type of Assistance Needed Physical assistance   Physical Assistance Level 26%-50%   Comment TA to manage B/L knee high teds; Use of sock aide to don B/L slipper socks - Pt reports completing dressing routine on lower surface area; Reports wife would be able to assist with donning teds upon D/C if recommended for home. Putting On/Taking Off Footwear CARE Score 3   Sit to Lying   Type of Assistance Needed Supervision   Physical Assistance Level No physical assistance   Comment HOB slightly elevated   Sit to Lying CARE Score 4   Lying to Sitting on Side of Bed   Type of Assistance Needed Supervision   Physical Assistance Level No physical assistance   Comment HOB slightly elevated   Lying to Sitting on Side of Bed CARE Score 4   Sit to Stand   Type of Assistance Needed Incidental touching; Adaptive equipment   Physical Assistance Level No physical assistance   Comment CG with RW   Sit to Stand CARE Score 4   Bed-Chair Transfer   Type of Assistance Needed Incidental touching; Adaptive equipment   Physical Assistance Level No physical assistance   Comment CGA with RW   Chair/Bed-to-Chair Transfer CARE Score 4   Toileting Hygiene   Type of Assistance Needed Incidental touching; Adaptive equipment   Physical Assistance Level No physical assistance   Comment CGA in stance at RW for CM; Seated to perform rear hygiene   Toileting Hygiene CARE Score 4   Toilet Transfer   Type of Assistance Needed Incidental touching; Adaptive equipment   Physical Assistance Level No physical assistance   Comment CGA with RW to raised toilet seate with use of R side grab bar, as pt reports having grab bar next to toilet for D/C. Toilet Transfer CARE Score 4   Cognition   Overall Cognitive Status Impaired   Arousal/Participation Alert; Cooperative   Attention Attends with cues to redirect   Orientation Level Oriented X4   Memory Decreased short term memory   Following Commands Follows one step commands without difficulty   Activity Tolerance   Activity Tolerance Patient tolerated treatment well   Other Comments   Assessment /68 (seated), /68 (standing) - Teds donned, ABD binder not utilized throughout session; No c/o of dizziness   Assessment   Treatment Assessment Pt participated in 90 min skilled OT Tx session with focus on ADL performance, fxnl transfer's, and improving overall activity tolerance. See above for further Tx details. Pt continues to demonstrate progress towards OT goals. Requires overall CGA for ADLs and fxnl transfer's with RW. Assist to manage teds, however, pt reports wife can assist with task upon D/C if recommended for home. Tolerated session well with BP remaining WNLs. No c/o of dizziness or feeling lightheaded. Pt would continue to benefit from skilled OT services to improve fxnl standing balance, improve activity tolerance, improve fxnl reach, and maximize IND with ADL performance and transfer's in order to progress towards OT goals and dec caregiver burden upon D/C. Prognosis Good   Problem List Decreased strength;Decreased range of motion;Decreased endurance; Impaired balance;Decreased mobility; Decreased cognition; Impaired vision   Barriers to Discharge Inaccessible home environment   Plan   Treatment/Interventions ADL retraining;Functional transfer training; Therapeutic exercise; Endurance training   Progress Progressing toward goals   Recommendation   OT Discharge Recommendation   (Pending progress)   OT Therapy Minutes   OT Time In 0830   OT Time Out 1000   OT Total Time (minutes) 90   OT Mode of treatment - Individual (minutes) 90   OT Mode of treatment - Concurrent (minutes) 0   OT Mode of treatment - Group (minutes) 0   OT Mode of treatment - Co-treat (minutes) 0   OT Mode of Treatment - Total time(minutes) 90 minutes   OT Cumulative Minutes 585   Therapy Time missed   Time missed?  No

## 2023-09-25 NOTE — ASSESSMENT & PLAN NOTE
Noted to be in a-flutter in acute setting. ECHO obtained on 9/16: EF 50-55%, abnormal diastolic inflow due to atrial flutter, L atrium moderately dilated, and R atrium mildly dilated. Monitor routine VS.  Replete electrolytes as needed. Continue with magnesium oxide 400mg daily. Last EKG showed QTc of 526. Repeat EKG on 9/20 showed QTc of 499. Metoprolol discontinued. Currently not taking anticoagulation - [id number] score of 8. May benefit from anticoagulation - consider starting after follow-up with Neurosurgery. Follows with Dr. Mich Cunningham (Cardiology) as outpatient. Repeat EKG on 9/20 shows that patient is still in atrial flutter. Cardiology consulted. Hold AV node blockers. Consider anticoagulation once stable per Neurosurgery perspective. Follow-up with Cardiology as outpatient. No need for repeat ECHO at this time.

## 2023-09-25 NOTE — ASSESSMENT & PLAN NOTE
S/p fall on 9/15. [address] on 9/15 showed multicompartmental hemorrhage with small foci of subarachnoid hemorrhage in the bilateral inferior frontal region and additional small foci of subarachnoid hemorrhage. Repeat CTH on 9/17 stable. Changes noted on repeat CTH on 9/21. Neurosurgery follow-up this week. Hold AC/AP. Has been restarted on DVT ppx per Neurosurgery. Neurovascular checks Q shift. Maintain SBP <160. Completed Keppra for 7 days for seizure ppx. Follow-up with Neurovascular in 2 weeks with repeat CTH (9/29). Primary team following. PT/OT.

## 2023-09-25 NOTE — ASSESSMENT & PLAN NOTE
Difficulty sleeping in the hospital setting. Currently on melatonin 6mg at HS. Started on trazodone 50mg at HS per Primary team tonight. Obtain sleep logs.

## 2023-09-25 NOTE — TELEPHONE ENCOUNTER
Spoke with spouse, she reports the patient will need two more weeks of rehab. Pt scheduled for 10/17 2:40 [facility]. Advised her to call if anything was to change prior to then, she was appreciative of this.

## 2023-09-25 NOTE — ASSESSMENT & PLAN NOTE
Lab Results   Component Value Date    EGFR 29 09/25/2023    EGFR 29 09/20/2023    EGFR 25 09/19/2023    CREATININE 2.05 (H) 09/25/2023    CREATININE 2.04 (H) 09/20/2023    CREATININE 2.29 (H) 09/19/2023     Creatinine currently 2.05. Baseline creatinine 2.5-2.8. Avoid nephrotoxins as able - losartan currently on hold. Receiving torsemide 20mg EOD. Ensure adequate hydration. Currently being seen by Vascular for possible fistula creation as OP - on hold due to cancer treatment. Continue sodium bicarbonate 1300mg BID.   Follows with Nephrology as outpatient - Dr. Karo Montenegro.

## 2023-09-25 NOTE — PROGRESS NOTES
09/25/23 1430   Pain Assessment   Pain Assessment Tool 0-10   Pain Score No Pain   Restrictions/Precautions   Precautions Fall Risk;Seizure   Weight Bearing Restrictions No   ROM Restrictions No   Comprehension   Comprehension (FIM) 5 - Understands basic directions and conversation   Expression   Expression (FIM) 6 - Has only MILD difficulty with complex/abstract info   Social Interaction   Social Interaction (FIM) 6 - Interacts appropriately with others BUT requires extra  time   Problem Solving   Problem solving (FIM) 4 - Solves basic problems 75-89% of time   Memory   Memory (FIM) 4 - Recognizes/recalls/performs 75-89%   Speech/Language/Cognition Assessmetn   Treatment Assessment Pt seen for brief skilled speech therapy session targeting cognitive linguistic communication skills and updating wife. Pt alert and interactive- introduced wife to SLP. Provided wife with update in regards to cognitive testing and goals targeted. Wife expressed she is surprised with how well he is doing given his injury. Reviewed whiteboard with pt and wife regarding current mobility and ADL status. Confirmed wife does all IADL tasks except his meds. Pt was able to sort in pill boxes prior on his own and use boxes during the week. Pt stated he was only taking meds 2x/day prior- unsure what his regimen is now. SLP to review OT notes regards to how much of task was completed and discuss with current OT regarding targeting goal. Discussed cont'd higher level cognitive skills to cont to target to maximize ST/working memory and executive functions. All in agreement. Pt overall is improving with skilled SLP services and will cont to benefit to maximize overall cognitive linguistic communication abilities at this time.    SLP Therapy Minutes   SLP Time In 1430   SLP Time Out 4595   SLP Total Time (minutes) 15   SLP Mode of treatment - Individual (minutes) 15   SLP Mode of treatment - Concurrent (minutes) 0   SLP Mode of treatment - Group (minutes) 0   SLP Mode of treatment - Co-treat (minutes) 0   SLP Mode of Treatment - Total time(minutes) 15 minutes   SLP Cumulative Minutes 165   Therapy Time missed   Time missed?  No

## 2023-09-25 NOTE — ASSESSMENT & PLAN NOTE
Hx of CABG. Continue Lipitor 40mg daily. Metoprolol on hold currently due to bradycardia and prolonged QTc. Does not take ASA as outpatient - may benefit due to hx of CAD and possible stroke seen on CT. Recommend discussing AP/AC use at 2 week follow-up with Neurosurgery. Elevated troponins in acute setting - felt to be type II MI in setting of trauma.

## 2023-09-25 NOTE — ASSESSMENT & PLAN NOTE
Home regimen: losartan 25mg daily, metoprolol tartrate 25mg Q12, and torsemide 20mg EOD. Currently receiving amlodipine 5mg daily, torsemide 20mg EOD, and hydralazine 10mg TID. Metoprolol currently on hold due to prolonged QTc and bradycardia. Maintain SBP <160 due to recent intracranial bleed and >140 due to renal function. Continue to monitor BP with routine VS.  Follows with Dr. Ashlyn Parikh (Cardiology) and Dr. Lizette Vaughan (Nephrology) as outpatient.

## 2023-09-25 NOTE — ASSESSMENT & PLAN NOTE
WBC count currently 12.97 from 11.99. Chronically elevated as outpatient. Currently no active s/s of infection. Received Unasyn due to open fracture. UA obtained on 9/19 - negative for infection. Continue to trend routine CBC.

## 2023-09-25 NOTE — NURSING NOTE
This RN and CC educated patient and spouse, Kirstin Grossman regarding infection prevention by keeping urostomy bag a closed system and not switching out large urostomy bag for small bag during day. Patient reports he uses small bag during day and large bag at night at home. Stoma is pink and round. This RN attached large sweet drainage bag at the end of the urostomy 2 piece small bag and educated patient we will keep in place until d/c home. Patient aware he can go back to using his urostomy supplies once at home and follow his routine. This RN noted that patient's 2 piece small bag was changed/dated 9/19/23. Patient is refusing to have bag changed at this time. Urostomy is patent and draining clear, yellow urine. Patient denies discomfort at site. Will continue to monitor.

## 2023-09-26 ENCOUNTER — PATIENT OUTREACH (OUTPATIENT)
Dept: CASE MANAGEMENT | Facility: OTHER | Age: 83
End: 2023-09-26

## 2023-09-26 LAB
GLUCOSE SERPL-MCNC: 131 MG/DL (ref 65–140)
GLUCOSE SERPL-MCNC: 211 MG/DL (ref 65–140)

## 2023-09-26 PROCEDURE — 97530 THERAPEUTIC ACTIVITIES: CPT

## 2023-09-26 PROCEDURE — 97535 SELF CARE MNGMENT TRAINING: CPT

## 2023-09-26 PROCEDURE — 99232 SBSQ HOSP IP/OBS MODERATE 35: CPT | Performed by: INTERNAL MEDICINE

## 2023-09-26 PROCEDURE — 97112 NEUROMUSCULAR REEDUCATION: CPT

## 2023-09-26 PROCEDURE — 82948 REAGENT STRIP/BLOOD GLUCOSE: CPT

## 2023-09-26 PROCEDURE — 99232 SBSQ HOSP IP/OBS MODERATE 35: CPT

## 2023-09-26 PROCEDURE — 97110 THERAPEUTIC EXERCISES: CPT

## 2023-09-26 RX ADMIN — HYDRALAZINE HYDROCHLORIDE 10 MG: 10 TABLET, FILM COATED ORAL at 21:16

## 2023-09-26 RX ADMIN — OFLOXACIN 5 DROP: 3 SOLUTION OPHTHALMIC at 09:31

## 2023-09-26 RX ADMIN — HEPARIN SODIUM 5000 UNITS: 5000 INJECTION INTRAVENOUS; SUBCUTANEOUS at 21:17

## 2023-09-26 RX ADMIN — HYDRALAZINE HYDROCHLORIDE 10 MG: 10 TABLET, FILM COATED ORAL at 09:31

## 2023-09-26 RX ADMIN — HEPARIN SODIUM 5000 UNITS: 5000 INJECTION INTRAVENOUS; SUBCUTANEOUS at 06:58

## 2023-09-26 RX ADMIN — FOLIC ACID 1 MG: 1 TABLET ORAL at 09:31

## 2023-09-26 RX ADMIN — FLUTICASONE PROPIONATE 1 SPRAY: 50 SPRAY, METERED NASAL at 09:31

## 2023-09-26 RX ADMIN — SENNOSIDES 8.6 MG: 8.6 TABLET, FILM COATED ORAL at 09:31

## 2023-09-26 RX ADMIN — CITALOPRAM HYDROBROMIDE 20 MG: 20 TABLET ORAL at 09:31

## 2023-09-26 RX ADMIN — SODIUM BICARBONATE 650 MG TABLET 1300 MG: at 09:31

## 2023-09-26 RX ADMIN — AMLODIPINE BESYLATE 5 MG: 5 TABLET ORAL at 21:15

## 2023-09-26 RX ADMIN — ATORVASTATIN CALCIUM 40 MG: 40 TABLET, FILM COATED ORAL at 21:14

## 2023-09-26 RX ADMIN — FERROUS GLUCONATE 324 MG: 324 TABLET ORAL at 06:58

## 2023-09-26 RX ADMIN — MAGNESIUM OXIDE TAB 400 MG (241.3 MG ELEMENTAL MG) 400 MG: 400 (241.3 MG) TAB at 09:31

## 2023-09-26 RX ADMIN — Medication 6 MG: at 21:15

## 2023-09-26 RX ADMIN — SENNOSIDES 8.6 MG: 8.6 TABLET, FILM COATED ORAL at 17:20

## 2023-09-26 RX ADMIN — LORATADINE 10 MG: 10 TABLET ORAL at 21:16

## 2023-09-26 RX ADMIN — Medication 250 MG: at 09:31

## 2023-09-26 RX ADMIN — OXYCODONE HYDROCHLORIDE AND ACETAMINOPHEN 500 MG: 500 TABLET ORAL at 09:31

## 2023-09-26 RX ADMIN — AMLODIPINE BESYLATE 5 MG: 5 TABLET ORAL at 09:31

## 2023-09-26 RX ADMIN — PANTOPRAZOLE SODIUM 40 MG: 40 TABLET, DELAYED RELEASE ORAL at 06:58

## 2023-09-26 RX ADMIN — SODIUM BICARBONATE 650 MG TABLET 1300 MG: at 17:20

## 2023-09-26 RX ADMIN — HEPARIN SODIUM 5000 UNITS: 5000 INJECTION INTRAVENOUS; SUBCUTANEOUS at 14:31

## 2023-09-26 RX ADMIN — HYDRALAZINE HYDROCHLORIDE 10 MG: 10 TABLET, FILM COATED ORAL at 16:27

## 2023-09-26 RX ADMIN — TRAZODONE HYDROCHLORIDE 25 MG: 50 TABLET ORAL at 21:15

## 2023-09-26 RX ADMIN — Medication 250 MG: at 17:20

## 2023-09-26 NOTE — PROGRESS NOTES
09/26/23 1400   Pain Assessment   Pain Assessment Tool 0-10   Restrictions/Precautions   Precautions Fall Risk; Goins; Seizure  (ok without pads on bed)   Braces or Orthoses   (TEDs)   General   Change In Medical/Functional Status keep SBP 140s-160s   Cognition   Overall Cognitive Status Impaired   Subjective   Subjective no complaints at this time   Roll Left and Right   Type of Assistance Needed Supervision   Roll Left and Right CARE Score 4   Sit to Lying   Type of Assistance Needed Supervision   Sit to Lying CARE Score 4   Sit to Stand   Type of Assistance Needed Incidental touching;Supervision   Comment CS/CGA   Sit to Stand CARE Score 4   Bed-Chair Transfer   Type of Assistance Needed Incidental touching; Adaptive equipment   Comment with RW   Chair/Bed-to-Chair Transfer CARE Score 4   Transfer Bed/Chair/Wheelchair   Findings did complete most of session without AD for balance training and righting reactions;  tx status does not change with or without AD   Walk 10 Feet   Type of Assistance Needed Incidental touching;Supervision; Adaptive equipment   Comment CS/CGA with RW   Walk 10 Feet CARE Score 4   Walk 50 Feet with Two Turns   Type of Assistance Needed Incidental touching;Supervision; Adaptive equipment   Comment CS/CGA with RW   Walk 50 Feet with Two Turns CARE Score 4   Walk 150 Feet   Type of Assistance Needed Incidental touching;Supervision; Adaptive equipment   Comment CS/CGA with RW   Walk 150 Feet CARE Score 4   Walking 10 Feet on Uneven Surfaces   Type of Assistance Needed Physical assistance   Physical Assistance Level 25% or less   Walking 10 Feet on Uneven Surfaces CARE Score 3   Ambulation   Distance Walked (feet) 300 ft   Assist Device Roller Walker  (and no AD)   Gait Pattern Inconsistant Ifrah; Slow Ifrah;Decreased foot clearance; Improper weight shift; Wide ANNA   Limitations Noted In Balance; Endurance; Heel Strike;Speed;Strength   Provided Assistance with: Balance   Findings CS/CGA with RW; CG;Rani no AD working on balance and righting reactions; recommendation for RW at time of d/c   Curb or Single Stair   Style negotiated Curb   Type of Assistance Needed Physical assistance; Adaptive equipment   Physical Assistance Level 25% or less   Comment 8" curb step with RW   1 Step (Curb) CARE Score 3   Therapeutic Interventions   Neuromuscular Re-Education amb with on AD working on increase gait speed; carrying item around in hand with no AD; 4 square balance test with random directions given to patient x5mins x2 with and without elevated obstacles   Equipment Use   NuStep L2 12mins   Assessment   Treatment Assessment pt tolerated tx well with cont focus on balance and righting reactions without AD.  pt cont to amb at a slower gait without AD and due to visual deficits, a use of RW will be recommended for d/c.  at this time, pt on track for achieving d/c goals with support of spouse at d/c.  cont to work on balance, strenght nad tolerance to activity to maximize functional ind   Problem List Decreased strength;Decreased range of motion;Decreased endurance; Impaired balance;Decreased mobility; Decreased cognition; Impaired vision   Barriers to Discharge Inaccessible home environment   PT Barriers   Functional Limitation Walking;Transfers;Stair negotiation;Car transfers   Plan   Progress Progressing toward goals   Recommendation   PT Discharge Recommendation Home with home health rehabilitation   Equipment Recommended Walker   PT Therapy Minutes   PT Time In 1400   PT Time Out 1530   PT Total Time (minutes) 90   PT Mode of treatment - Individual (minutes) 70   PT Mode of treatment - Concurrent (minutes) 20   PT Mode of treatment - Group (minutes) 0   PT Mode of treatment - Co-treat (minutes) 0   PT Mode of Treatment - Total time(minutes) 90 minutes   PT Cumulative Minutes 450   Therapy Time missed   Time missed?  No

## 2023-09-26 NOTE — ASSESSMENT & PLAN NOTE
Lab Results   Component Value Date    HGBA1C 6.3 06/05/2023       Recent Labs     09/24/23  1623 09/25/23  0605 09/25/23  1612 09/26/23  0554   POCGLU 199* 139 194* 131       Blood Sugar Average: Last 72 hrs:  (P) [id number]     Currently diet controlled at home. A1c 6.3 on 6/5/2023. Elester Newer at home for renal protection. Continue BID accuchecks and cons. carb diet.

## 2023-09-26 NOTE — ASSESSMENT & PLAN NOTE
Lab Results   Component Value Date    EGFR 29 09/25/2023    EGFR 29 09/20/2023    EGFR 25 09/19/2023    CREATININE 2.05 (H) 09/25/2023    CREATININE 2.04 (H) 09/20/2023    CREATININE 2.29 (H) 09/19/2023     Creatinine currently 2.05. Baseline creatinine 2.5-2.8. Avoid nephrotoxins as able - losartan currently on hold. Receiving torsemide 20mg EOD. Ensure adequate hydration. Currently being seen by Vascular for possible fistula creation as OP - on hold due to cancer treatment. Continue sodium bicarbonate 1300mg BID.   Follows with Nephrology as outpatient - Dr. Sandra Shankar.

## 2023-09-26 NOTE — ASSESSMENT & PLAN NOTE
Noted to be in a-flutter in acute setting. ECHO obtained on 9/16: EF 50-55%, abnormal diastolic inflow due to atrial flutter, L atrium moderately dilated, and R atrium mildly dilated. Monitor routine VS.  Replete electrolytes as needed. Continue with magnesium oxide 400mg daily. Last EKG showed QTc of 526. Repeat EKG on 9/20 showed QTc of 499. Metoprolol discontinued. Currently not taking anticoagulation - [id number] score of 8. May benefit from anticoagulation - consider starting after follow-up with Neurosurgery. Follows with Dr. Sangeeta Singh (Cardiology) as outpatient. Repeat EKG on 9/20 shows that patient is still in atrial flutter. Cardiology consulted. Hold AV node blockers. Consider anticoagulation once stable per Neurosurgery perspective. Follow-up with Cardiology as outpatient. No need for repeat ECHO at this time.

## 2023-09-26 NOTE — ASSESSMENT & PLAN NOTE
Worsening R sided headache on 9/21 along with hypertension. 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 Trace midline shift to the left. Stable hyperdense hemorrhages seen in the right subarachnoid/subdural along the right frontal and right temporal regions. No new area of acute hemorrhage. Stable left frontal interior contusions. Stable ventricular size and stable volume of the hyperdense layering blood products in the occipital horns. Neurosurgery recommending f/u CTH in 1 week. Discuss embo at that time. Neurovascular checks Q shift. Repeat CTH with any acute changes.

## 2023-09-26 NOTE — PLAN OF CARE
Problem: GENITOURINARY - ADULT  Goal: Maintains or returns to baseline urinary function  Description: INTERVENTIONS:  - Assess urinary function  - Encourage oral fluids to ensure adequate hydration if ordered  - Administer IV fluids as ordered to ensure adequate hydration  - Administer ordered medications as needed  - Offer frequent toileting  - Follow urinary retention protocol if ordered  Outcome: Progressing     Problem: SKIN/TISSUE INTEGRITY - ADULT  Goal: Incision(s), wounds(s) or drain site(s) healing without S/S of infection  Description: INTERVENTIONS  - Assess and document dressing, incision, wound bed, drain sites and surrounding tissue  - Provide patient and family education  - Perform skin care/dressing changes every day  Outcome: Progressing     Problem: SAFETY ADULT  Goal: Patient will remain free of falls  Description: INTERVENTIONS:  - Educate patient/family on patient safety including physical limitations  - Instruct patient to call for assistance with activity   - Consult OT/PT to assist with strengthening/mobility   - Keep Call bell within reach  - Keep bed low and locked with side rails adjusted as appropriate  - Keep care items and personal belongings within reach  - Initiate and maintain comfort rounds  - Make Fall Risk Sign visible to staff  - Offer Toileting every 2 Hours, in advance of need  - Obtain necessary fall risk management equipment: RW  - Apply yellow socks and bracelet for high fall risk patients  - Consider moving patient to room near nurses station  Outcome: Progressing     Problem: DISCHARGE PLANNING  Goal: Discharge to home or other facility with appropriate resources  Description: INTERVENTIONS:  - Identify barriers to discharge w/patient and caregiver  - Arrange for needed discharge resources and transportation as appropriate  - Identify discharge learning needs (meds, wound care, etc.)  - Arrange for interpretive services to assist at discharge as needed  - Refer to Case Management Department for coordinating discharge planning if the patient needs post-hospital services based on physician/advanced practitioner order or complex needs related to functional status, cognitive ability, or social support system  Outcome: Progressing

## 2023-09-26 NOTE — PROGRESS NOTES
[address] [facility]  Progress Note  Name: Reinaldo Blackman  MRN: [id number]  Unit/Bed#: -61 I Date of Admission: 9/19/2023   Date of Service: 9/26/2023 I Hospital Day: 7    Assessment/Plan   Snoring  Assessment & Plan  Wife has noted that patient snores at HS. Obtaining overnight noc ox on 9/22 to determine if patient has sleep apnea. Study showed desat for less than 2 minutes with desat as low as 81%. No need for O2 on discharge. Acute headache  Assessment & Plan  Worsening R sided headache on 9/21 along with hypertension. 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 Trace midline shift to the left. Stable hyperdense hemorrhages seen in the right subarachnoid/subdural along the right frontal and right temporal regions. No new area of acute hemorrhage. Stable left frontal interior contusions. Stable ventricular size and stable volume of the hyperdense layering blood products in the occipital horns. Neurosurgery recommending f/u CTH in 1 week. Discuss embo at that time. Neurovascular checks Q shift. Repeat CTH with any acute changes. Insomnia  Assessment & Plan  Difficulty sleeping in the hospital setting. Currently on melatonin 6mg at HS. Started on trazodone 50mg at [facility] per Primary team on 9/25. Obtain sleep logs. Sinus congestion  Assessment & Plan  Chronic. Uses over the counter nasal sprays and antihistamines. Started on Claritin and Flonase on 9/20. Encouraged to follow-up with ENT as outpatient. GERD (gastroesophageal reflux disease)  Assessment & Plan  Continue Protonix 40mg daily as substitute for non-formulary omeprazole. Started on PRN Tums due to complaints of indigestion. Leukocytosis  Assessment & Plan  WBC count currently 12.97 from 11.99. Chronically elevated as outpatient. Currently no active s/s of infection. Received Unasyn due to open fracture.   UA obtained on 9/19 - negative for infection. Continue to trend routine CBC. Atrial flutter [facility])  Assessment & Plan  Noted to be in a-flutter in acute setting. ECHO obtained on 9/16: EF 50-55%, abnormal diastolic inflow due to atrial flutter, L atrium moderately dilated, and R atrium mildly dilated. Monitor routine VS.  Replete electrolytes as needed. Continue with magnesium oxide 400mg daily. Last EKG showed QTc of 526. Repeat EKG on 9/20 showed QTc of 499. Metoprolol discontinued. Currently not taking anticoagulation - [id number] score of 8. May benefit from anticoagulation - consider starting after follow-up with Neurosurgery. Follows with Dr. Pinky Duffy (Cardiology) as outpatient. Repeat EKG on 9/20 shows that patient is still in atrial flutter. Cardiology consulted. Hold AV node blockers. Consider anticoagulation once stable per Neurosurgery perspective. Follow-up with Cardiology as outpatient. No need for repeat ECHO at this time. Abnormal findings on imaging test  Assessment & Plan  CTA chest/abd/pelvis on 9/15 with multiple concerning lymph nodes/lesions for metastasis including: nodule at the L obturator internus muscle, L sided mesenteric lymph nodes, R external iliac lymph node, R sided retroperitoneal lymph nodes, hypodense structure along with R pelvic sidewall, lesions in the R inferior hepatic lobe, peritoneal soft tissue nodularity, R cardiophrenic lymph node, 2 enlarged retrocrural lymph nodes, and R middle lobe nodule. Follow-up with Hematology/Oncology as outpatient. History of stroke  Assessment & Plan  CTH on 9/16 showed chronic appearing R caudate lacunar infarct. Currently on Lipitor 40mg daily. Would benefit from starting antiplatelet - would need to discuss with Neurosurgery at 2 week follow-up. Continue with PT/OT. Pneumocephalus  Assessment & Plan  Pneumocephalus within the sinus near the area of the fracture seen on CT.   Consider obtaining CTA/CTV if concerns for sinus thrombosis. SDH (subdural hematoma) ([facility])  Assessment & Plan  S/p fall on 9/15. [facility] on 9/15 showed small foci of subdural hemorrhage in the R insular region and temporal region without significant mass effect. Repeat CTH on 9/17 stable. Changes noted on repeat CTH on 9/21. Neurosurgery follow-up this week. Currently holding AC/AP. Has been restarted on DVT ppx per Neurosurgery. Neurovascular checks Q shift. Maintain SBP <160. Completed Keppra for 7 days for seizure ppx. Follow-up with Neurosurgery in 2 weeks with repeat CTH (9/29). Primary team following. PT/OT. Fracture of temporal bone [facility])  Assessment & Plan  S/p fall on 9/15. CTH showed hematoma in the L parietal vertex and L occipital region, non-displaced fracture through the R temporal bone, linear non-displaced fracture through the posterior wall of the condyle with middle ear hemorrhage, fracture passes through the hypotympanum, non-displaced fracture through the R occipital bone. ENT consulted in acute setting - follow-up 1 week after discharge for audiogram.  No surgical intervention needed at this time. Completed Unasyn for 7 day course for open fracture on 9/22. Completed Floxin drops to R ear for 10 days total on 9/25. Consider CTA/CTV as needed if concerns for sinus thrombosis given pneumocephalus within the sinus near the area of the fracture. Had not been done inpatient due to concerns of frequent contrast administration with CKD stage 4. Anemia due to stage 4 chronic kidney disease (HCC)  Assessment & Plan  Hgb currently 10. Had been receiving ferrous sulfate as OP. Vitamin B12 1,238 on 9/20. Iron panel completed on 9/20 and showed iron sat 14%, TIBC 215, and iron 30. Ferritin 131. Folate 8. Started on folic acid 1mg daily, ferrous gluconate 324mg daily, and vitamin C 500mg daily on 9/21. Currently asymptomatic. Continue to trend routine CBC.     Stage 4 chronic kidney disease [facility])  Assessment & Plan  Lab Results   Component Value Date    EGFR 29 09/25/2023    EGFR 29 09/20/2023    EGFR 25 09/19/2023    CREATININE 2.05 (H) 09/25/2023    CREATININE 2.04 (H) 09/20/2023    CREATININE 2.29 (H) 09/19/2023     Creatinine currently 2.05. Baseline creatinine 2.5-2.8. Avoid nephrotoxins as able - losartan currently on hold. Receiving torsemide 20mg EOD. Ensure adequate hydration. Currently being seen by Vascular for possible fistula creation as OP - on hold due to cancer treatment. Continue sodium bicarbonate 1300mg BID. Follows with Nephrology as outpatient - Dr. Maria M Go.    Benign hypertension with chronic kidney disease, stage IV [facility])  Assessment & Plan  Home regimen: losartan 25mg daily, metoprolol tartrate 25mg Q12, and torsemide 20mg EOD. Currently receiving amlodipine 5mg daily, torsemide 20mg EOD, and hydralazine 10mg TID. Metoprolol currently on hold due to prolonged QTc and bradycardia. Maintain SBP <160 due to recent intracranial bleed and >140 due to renal function. Continue to monitor BP with routine VS.  Follows with Dr. Hernan Nolen (Cardiology) and Dr. Maria M Go (Nephrology) as outpatient. Anxiety and depression  Assessment & Plan  Continue home Celexa 20mg daily. Supportive counseling as needed. Consider Neuropsych consult as needed. Ischemic cardiomyopathy  Assessment & Plan  Experienced a-flutter in acute setting. ECHO on 9/16 showed EF 50-55%, abnormal diastolic inflow due to atrial flutter, L atrium moderately dilated, and R atrium mildly dilated. Cardiology consulted and following. No need for repeat ECHO inpatient. Will obtain as OP if patient continues to be in a-flutter. Follows with Dr. Hernan Nolen (Cardiology) as outpatient. Coronary artery disease involving native coronary artery of native heart without angina pectoris  Assessment & Plan  Hx of CABG. Continue Lipitor 40mg daily. Metoprolol on hold currently due to bradycardia and prolonged QTc.   Does not take ASA as outpatient - may benefit due to hx of CAD and possible stroke seen on CT. Recommend discussing AP/AC use at 2 week follow-up with Neurosurgery. Elevated troponins in acute setting - felt to be type II MI in setting of trauma. 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 Underwent radical cystoprostatectomy with ileal conduit at [facility]. Recurrence in 2022 and was started chemotherapy. Currently receiving Avelunab and aranesp for 3 months. Follows with Dr. Jesica Smalls (Hem/Onc) as outpatient - due for appt on 9/26. Ordered PET scan for 9/19 but currently inpatient. CT chest/abd/pelvis in acute setting showed multiple concerns for metastasis. Follow-up with Hem/Onc as outpatient. Hyperlipidemia  Assessment & Plan  Continue home Lipitor 40mg daily. DMII (diabetes mellitus, type 2) [facility])  Assessment & Plan  Lab Results   Component Value Date    HGBA1C 6.3 06/05/2023       Recent Labs     09/24/23  1623 09/25/23  0605 09/25/23  1612 09/26/23  0554   POCGLU 199* 139 194* 131       Blood Sugar Average: Last 72 hrs:  (P) [id number]     Currently diet controlled at home. A1c 6.3 on 6/5/2023. Cristhian Brace at home for renal protection. Continue BID accuchecks and cons. carb diet. * SAH (subarachnoid hemorrhage) ([address])  Assessment & Plan  S/p fall on 9/15. [facility] on 9/15 showed multicompartmental hemorrhage with small foci of subarachnoid hemorrhage in the bilateral inferior frontal region and additional small foci of subarachnoid hemorrhage. Repeat CTH on 9/17 stable. Changes noted on repeat CTH on 9/21. Neurosurgery follow-up this week. Hold AC/AP. Has been restarted on DVT ppx per Neurosurgery. Neurovascular checks Q shift. Maintain SBP <160. Completed Keppra for 7 days for seizure ppx. Follow-up with Neurovascular in 2 weeks with repeat CTH (9/29). Primary team following. PT/OT. VTE Pharmacologic Prophylaxis:   Pharmacologic: Heparin  Mechanical VTE Prophylaxis in Place: Yes - sequential compression devices. Current Length of Stay: 7 day(s)    Current Patient Status: Inpatient Rehab     Discharge Plan: As per primary team.    Code Status: Level 1 - Full Code    Subjective:   Pt examined while pt sitting in recliner in pt room. Currently denies any headaches, lightheadedness, or dizziness. Still has occasional R sided head pressure/ear pressure and feels that his hearing is still muffled but denies worsening symptoms. Slept much better last night. Feels that therapy is going well. Denies any further nausea and had a BM today without any issues. Has no other concerns or complaints at this time. Objective:     Vitals:   Temp (24hrs), Av.9 °F (37.2 °C), Min:98.7 °F (37.1 °C), Max:99.2 °F (37.3 °C)    Temp:  [98.7 °F (37.1 °C)-99.2 °F (37.3 °C)] 98.7 °F (37.1 °C)  HR:  [66-73] 73  Resp:  [18-20] 18  BP: (130-152)/(64-80) 150/70  SpO2:  [96 %-97 %] 96 %  Body mass index is 28.97 kg/m². Review of Systems   Constitutional: Negative for appetite change, chills, fatigue and fever. HENT: Negative for ear discharge, ear pain and trouble swallowing. Pressure to the R ear, sounds muffled, unchanged since injury   Eyes: Negative for visual disturbance. Respiratory: Negative for cough, chest tightness, shortness of breath, wheezing and stridor. Cardiovascular: Negative for chest pain, palpitations and leg swelling. Gastrointestinal: Negative for abdominal distention, abdominal pain, constipation, diarrhea, nausea and vomiting. LBM    Genitourinary: Negative for difficulty urinating. Musculoskeletal: Negative for arthralgias, back pain and gait problem. Neurological: Negative for dizziness, weakness, light-headedness, numbness and headaches. Psychiatric/Behavioral: Negative for dysphoric mood and sleep disturbance. The patient is not nervous/anxious. All other systems reviewed and are negative. Input and Output Summary (last 24 hours): Intake/Output Summary (Last 24 hours) at 9/26/2023 3452  Last data filed at 9/26/2023 2482  Gross per 24 hour   Intake 1160 ml   Output 1250 ml   Net -90 ml       Physical Exam:     Physical Exam  Vitals and nursing note reviewed. Constitutional:       General: He is not in acute distress. Appearance: Normal appearance. He is not ill-appearing. HENT:      Head: Normocephalic and atraumatic. Cardiovascular:      Rate and Rhythm: Normal rate and regular rhythm. Pulses: Normal pulses. Heart sounds: Normal heart sounds. No murmur heard. No friction rub. Pulmonary:      Effort: Pulmonary effort is normal. No respiratory distress. Breath sounds: Normal breath sounds. No wheezing or rhonchi. Abdominal:      General: Abdomen is flat. Bowel sounds are normal. There is no distension. Palpations: Abdomen is soft. There is no mass. Tenderness: There is no abdominal tenderness. There is no guarding or rebound. Hernia: No hernia is present. Genitourinary:     Comments: Ileal conduit, draining clear yellow urine. Musculoskeletal:      Cervical back: Normal range of motion and neck supple. No tenderness. Right lower leg: No edema. Left lower leg: No edema. Skin:     General: Skin is warm and dry. Capillary Refill: Capillary refill takes less than 2 seconds. Neurological:      Mental Status: He is alert and oriented to person, place, and time.    Psychiatric:         Mood and Affect: Mood normal.         Behavior: Behavior normal.         Additional Data:     Labs:    Results from last 7 days   Lab Units 09/25/23  0523   WBC Thousand/uL 12.97*   HEMOGLOBIN g/dL 10.0*   HEMATOCRIT % 31.7*   PLATELETS Thousands/uL 223   NEUTROS PCT % 80*   LYMPHS PCT % 7*   MONOS PCT % 10   EOS PCT % 1     Results from last 7 days   Lab Units 09/25/23  0523 09/20/23  0614   SODIUM mmol/L 135 136   POTASSIUM mmol/L 3.6 3.7   CHLORIDE mmol/L 102 104   CO2 mmol/L 22 20*   BUN mg/dL 27* 30*   CREATININE mg/dL 2.05* 2.04*   ANION GAP mmol/L 11 12   CALCIUM mg/dL 8.7 8.5   ALBUMIN g/dL  --  3.5   TOTAL BILIRUBIN mg/dL  --  0.54   ALK PHOS U/L  --  74   ALT U/L  --  8   AST U/L  --  10*   GLUCOSE RANDOM mg/dL 142* 142*         Results from last 7 days   Lab Units 09/26/23  0554 09/25/23  1612 09/25/23  0605 09/24/23  1623 09/24/23  0600 09/23/23  1631 09/23/23  0607 09/22/23  1626 09/22/23  0626 09/21/23  1634 09/21/23  0654 09/20/23  2042   POC GLUCOSE mg/dl 131 194* 139 199* 116 155* 120 125 115 118 130 146*               Labs reviewed    Imaging:    Imaging reviewed    Recent Cultures (last 7 days):           Last 24 Hours Medication List:   Current Facility-Administered Medications   Medication Dose Route Frequency Provider Last Rate   • acetaminophen  650 mg Oral Q6H PRN Sunday Cee MD     • amLODIPine  5 mg Oral BID LUKE Wright     • ascorbic acid  500 mg Oral Daily LUKE Wright     • atorvastatin  40 mg Oral HS Sunday Cee MD     • bisacodyl  10 mg Rectal Daily PRN Sunday Cee MD     • calcium carbonate  500 mg Oral Daily PRN LUKE Wright     • citalopram  20 mg Oral Daily Sunday Cee MD     • ferrous gluconate  324 mg Oral Daily Before Breakfast LUKE Wright     • fluticasone  1 spray Each Nare Daily LUKE Wright     • folic acid  1 mg Oral Daily LUKE Wright     • heparin (porcine)  5,000 Units Subcutaneous [facility] Sunday Cee MD     • hydrALAZINE  10 mg Oral Q8H PRN LUKE Wright     • hydrALAZINE  10 mg Oral TID LUKE Wright     • loratadine  10 mg Oral HS LUKE Wright     • magnesium Oxide  400 mg Oral Daily LUKE Wright     • melatonin  6 mg Oral HS Sunday Cee MD     • ofloxacin  5 drop Otic Daily Sunday Cee MD     • oxyCODONE  2.5 mg Oral Q8H PRN Sunday Cee MD • pantoprazole  40 mg Oral Early Morning Rafat Goel MD     • polyethylene glycol  17 g Oral Daily PRN Rafat Goel MD     • saccharomyces boulardii  250 mg Oral BID LUKE Hui     • senna  1 tablet Oral BID Rafat Goel MD     • sodium bicarbonate  1,300 mg Oral BID after meals Rafat Goel MD     • torsemide  20 mg Oral Every Other Day Rafat Goel MD     • traZODone  25 mg Oral HS Rafat Goel MD     • trimethobenzamide  200 mg Intramuscular Q6H PRN LUKE Hui          [employer] software was used to dictate this note. It may contain errors with dictating incorrect words or incorrect spelling. Please contact the provider directly with any questions.

## 2023-09-26 NOTE — ASSESSMENT & PLAN NOTE
[facility] on 9/16 showed chronic appearing R caudate lacunar infarct. Currently on Lipitor 40mg daily. Would benefit from starting antiplatelet - would need to discuss with Neurosurgery at 2 week follow-up. Continue with PT/OT.

## 2023-09-26 NOTE — ASSESSMENT & PLAN NOTE
S/p fall on 9/15. [address] on 9/15 showed small foci of subdural hemorrhage in the R insular region and temporal region without significant mass effect. Repeat CTH on 9/17 stable. Changes noted on repeat CTH on 9/21. Neurosurgery follow-up this week. Currently holding AC/AP. Has been restarted on DVT ppx per Neurosurgery. Neurovascular checks Q shift. Maintain SBP <160. Completed Keppra for 7 days for seizure ppx. Follow-up with Neurosurgery in 2 weeks with repeat CTH (9/29). Primary team following. PT/OT.

## 2023-09-26 NOTE — ASSESSMENT & PLAN NOTE
Home regimen: losartan 25mg daily, metoprolol tartrate 25mg Q12, and torsemide 20mg EOD. Currently receiving amlodipine 5mg daily, torsemide 20mg EOD, and hydralazine 10mg TID. Metoprolol currently on hold due to prolonged QTc and bradycardia. Maintain SBP <160 due to recent intracranial bleed and >140 due to renal function. Continue to monitor BP with routine VS.  Follows with Dr. Jesus Sarabia (Cardiology) and Dr. Curtis Portillo (Nephrology) as outpatient.

## 2023-09-26 NOTE — ASSESSMENT & PLAN NOTE
Experienced a-flutter in acute setting. ECHO on 9/16 showed EF 50-55%, abnormal diastolic inflow due to atrial flutter, L atrium moderately dilated, and R atrium mildly dilated. Cardiology consulted and following. No need for repeat ECHO inpatient. Will obtain as OP if patient continues to be in a-flutter. Follows with Dr. Hernan Nolen (Cardiology) as outpatient.

## 2023-09-26 NOTE — ASSESSMENT & PLAN NOTE
S/p fall on 9/15. CTH showed hematoma in the L parietal vertex and L occipital region, non-displaced fracture through the R temporal bone, linear non-displaced fracture through the posterior wall of the condyle with middle ear hemorrhage, fracture passes through the hypotympanum, non-displaced fracture through the R occipital bone. ENT consulted in acute setting - follow-up 1 week after discharge for audiogram.  No surgical intervention needed at this time. Completed Unasyn for 7 day course for open fracture on 9/22. Completed Floxin drops to R ear for 10 days total on 9/25. Consider CTA/CTV as needed if concerns for sinus thrombosis given pneumocephalus within the sinus near the area of the fracture. Had not been done inpatient due to concerns of frequent contrast administration with CKD stage 4.

## 2023-09-26 NOTE — PROGRESS NOTES
09/26/23 1000   Pain Assessment   Pain Assessment Tool 0-10   Pain Score No Pain   Restrictions/Precautions   Precautions Fall Risk;Seizure; Goins   Weight Bearing Restrictions No   ROM Restrictions No   Braces or Orthoses Other (Comment)  (B/L knee high teds - did not utilize ABD binder this session)   Lifestyle   Autonomy Pt agreeable to OT Tx huan. Pt's wife present for brief OT FT. Sit to Stand   Type of Assistance Needed Incidental touching; Adaptive equipment   Physical Assistance Level No physical assistance   Comment CG/CS with RW   Sit to Stand CARE Score 4   Bed-Chair Transfer   Type of Assistance Needed Incidental touching; Adaptive equipment   Physical Assistance Level No physical assistance   Comment CGA with RW   Chair/Bed-to-Chair Transfer CARE Score 4   Toileting Hygiene   Type of Assistance Needed Incidental touching; Adaptive equipment   Physical Assistance Level No physical assistance   Comment CGA in stance at RW for CM and rear hygiene   Toileting Hygiene CARE Score 4   Toilet Transfer   Type of Assistance Needed Incidental touching; Adaptive equipment   Physical Assistance Level No physical assistance   Comment CGA with RW to raised toilet seat with use of grab bar; Pt reports having grab bar positioned next to toilet at home, however, pt's wife reports "lower" toilet seat, agreeable to OT ordering BSC to use over the toilet upon D/C. Toilet Transfer CARE Score 4   Therapeutic Excerise-Strength   UE Strength Yes   Right Upper Extremity- Strength   R Shoulder Flexion;ABduction; Extension;Horizontal ABduction; External rotation; Internal rotation   R Elbow Elbow flexion;Elbow extension   R Position Seated   Equipment Theraband  (Red)   R Weight/Reps/Sets 3 sets of 10 reps   RUE Strength Comment Implemented UE HEP utilizing R theraband to perform 3 sets of 10 reps of indicated planes. Pt tolerated with rest breaks between each set. EDU provided on focus of HEP to maximize UE strength upon D/C.  Pt is receptive to completing UE HEP daily upon D/C. Left Upper Extremity-Strength   LUE Strength Comment See above. Cognition   Overall Cognitive Status Impaired   Arousal/Participation Alert; Cooperative   Attention Attends with cues to redirect   Orientation Level Oriented X4   Memory Decreased short term memory   Following Commands Follows one step commands with increased time or repetition   Additional Activities   Additional Activities Other (Comment)   Additional Activities Comments Pt engaged in item retrieval around [address] req pt to locate and retrieve colored cones from various heights with focus on maximizing overall activity tolerance. Pt tolerated well req overall CGA with no LOB. G safety with RW positioning. Activity Tolerance   Activity Tolerance Patient tolerated treatment well   Other Comments   Assessment /80 seated - No c/o of dizziness or feeling light headed throughout session. Assessment   Treatment Assessment Pt participated in 90 min skilled OT Tx session with focus on implementing UE HEP, improving activity tolerance, and completing brief OT FT. See above for further Tx details. Pt tolerated UE HEP with rest breaks. Agreeable to perform daily upon D/C in order to maximize UE strength for ADL's and fxnl transfer's. Pt's wife Franci Burdick) present at end of session. Discussed OT recommendations for pt to have SUP for ADLs and ADL transfer's upon D/C. Wife is agreeable, reporting that she can assist with managing teds if recommended for home. Franci Burdick communicated pt has a RW, and a tub bench for showering with grab bars in shower environment, as well as grab bar positioned next to toilet. Reports toilet seat is a bit low and is agreeable to Therapy ordering a standard BSC to place over the toilet at home. Franci Burdick also verified that she handles majority of IADLs (cooking and laundry). Pt and pt's wife present with no further questions at this time.  Pt would continue to benefit from skilled OT services to improve fxnl standing balance, improve activity tolerance, improve fxnl reach for LB dressing/footwear management, and maximize IND with ADL performance and transfer's inorder to progress towards OT goals and dec caregiver burdne upon D/C. OT Family training done with: David Kohler (wife)   Assessment of family training See above. Prognosis Good   Problem List Decreased strength;Decreased range of motion;Decreased endurance; Impaired balance;Decreased mobility; Decreased cognition; Impaired vision   Barriers to Discharge Inaccessible home environment   Plan   Treatment/Interventions ADL retraining;Functional transfer training; Therapeutic exercise; Endurance training   Progress Progressing toward goals   Recommendation   OT Discharge Recommendation   (Pending progress)   Equipment Recommended Bedside commode  (Pt has RW and tub bench for D/C.)   Commode Type Standard   OT Therapy Minutes   OT Time In 1000   OT Time Out 1130   OT Total Time (minutes) 90   OT Mode of treatment - Individual (minutes) 90   OT Mode of treatment - Concurrent (minutes) 0   OT Mode of treatment - Group (minutes) 0   OT Mode of treatment - Co-treat (minutes) 0   OT Mode of Treatment - Total time(minutes) 90 minutes   OT Cumulative Minutes 675   Therapy Time missed   Time missed?  No

## 2023-09-26 NOTE — PROGRESS NOTES
Chart review complete the patient is currently admitted to [facility] for STR. No LCD at this time. This Admin Coordinator will continue to monitor via chart review.

## 2023-09-26 NOTE — ASSESSMENT & PLAN NOTE
Difficulty sleeping in the hospital setting. Currently on melatonin 6mg at . Started on trazodone 50mg at [facility] [place] per Primary team on 9/25. Obtain sleep logs.

## 2023-09-26 NOTE — WOUND OSTOMY CARE
Consult Note - Wound   Merle Brady 80 y.o. male MRN: [id number]  Unit/Bed#: [facility] 356-36 Encounter: [id number]        Assessment Findings:   Wound care consulted to see patient for right forearm skin tear. Wound to right arm is partial thickness with beefy red fragile tissue with scant sanguineous drainage. Periwound skin is fragile. Sacral/buttocks and B/L heels intact and blanching, preventative skin care orders placed. Orders listed below and wound care will sign off, call or tiger text with questions. Bedside nurse updated of findings and orders. Flowsheets below with pictures and measurements. Plan:   Clean with NSS, pad dry completely, apply xeroform only over wound area. Cover with DSD and Allie Pollack; Change Daily. Wounds:  Wound 09/21/23 Skin Tear Arm Posterior;Right; Inferior; Lower (Active)   Wound Image   09/26/23 1021   Wound Description Bleeding;Fragile 09/26/23 1030   Shruthi-wound Assessment Pink;Purple;Fragile 09/26/23 1030   Wound Length (cm) 1 cm 09/21/23 2100   Wound Width (cm) 1.5 cm 09/21/23 2100   Wound Surface Area (cm^2) 1.5 cm^2 09/21/23 2100   Drainage Amount Scant 09/26/23 1030   Drainage Description Bloody 09/26/23 1030   Treatments Cleansed 09/26/23 1030   Dressing Xeroform;Gauze; Other (Comment) 09/26/23 1030   Wound packed? No 09/26/23 1030   Dressing Changed Changed 09/26/23 1030   Patient Tolerance Tolerated well 09/26/23 1030   Dressing Status Clean;Dry; Intact 09/26/23 1030             Olivia WILLIAMSONN, RN, Suleman Hathaway

## 2023-09-26 NOTE — PLAN OF CARE
Problem: NEUROSENSORY - ADULT  Goal: Achieves stable or improved neurological status  Description: INTERVENTIONS  - Monitor and report changes in neurological status  - Monitor vital signs such as temperature, blood pressure, glucose, and any other labs ordered   - Initiate measures to prevent increased intracranial pressure  - Monitor for seizure activity and implement precautions if appropriate      Outcome: Progressing     Problem: GASTROINTESTINAL - ADULT  Goal: Minimal or absence of nausea and/or vomiting  Description: INTERVENTIONS:  - Administer IV fluids if ordered to ensure adequate hydration  - Maintain NPO status until nausea and vomiting are resolved  - Nasogastric tube if ordered  - Administer ordered antiemetic medications as needed  - Provide nonpharmacologic comfort measures as appropriate  - Advance diet as tolerated, if ordered  - Consider nutrition services referral to assist patient with adequate nutrition and appropriate food choices  Outcome: Progressing normal (ped)...

## 2023-09-26 NOTE — ASSESSMENT & PLAN NOTE
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 Underwent radical cystoprostatectomy with ileal conduit at [facility]. Recurrence in 2022 and was started chemotherapy. Currently receiving Avelunab and aranesp for 3 months. Follows with Dr. Janice García (Hem/Onc) as outpatient - due for appt on 9/26. Ordered PET scan for 9/19 but currently inpatient. CT chest/abd/pelvis in acute setting showed multiple concerns for metastasis. Follow-up with Hem/Onc as outpatient.

## 2023-09-26 NOTE — DISCHARGE INSTR - OTHER ORDERS
Clean with NSS, pad dry completely, apply xeroform only over wound area. Cover with DSD and Janeann Carry; Change Daily.

## 2023-09-27 ENCOUNTER — TELEPHONE (OUTPATIENT)
Dept: CARDIOLOGY CLINIC | Facility: CLINIC | Age: 83
End: 2023-09-27

## 2023-09-27 PROBLEM — S40.811A: Status: ACTIVE | Noted: 2023-09-27

## 2023-09-27 PROBLEM — S51.811A SKIN TEAR OF RIGHT FOREARM WITHOUT COMPLICATION: Status: ACTIVE | Noted: 2023-09-27

## 2023-09-27 LAB
GLUCOSE SERPL-MCNC: 132 MG/DL (ref 65–140)
GLUCOSE SERPL-MCNC: 148 MG/DL (ref 65–140)

## 2023-09-27 PROCEDURE — 82948 REAGENT STRIP/BLOOD GLUCOSE: CPT

## 2023-09-27 PROCEDURE — 97535 SELF CARE MNGMENT TRAINING: CPT

## 2023-09-27 PROCEDURE — 97530 THERAPEUTIC ACTIVITIES: CPT

## 2023-09-27 PROCEDURE — 97110 THERAPEUTIC EXERCISES: CPT

## 2023-09-27 PROCEDURE — 99232 SBSQ HOSP IP/OBS MODERATE 35: CPT | Performed by: INTERNAL MEDICINE

## 2023-09-27 PROCEDURE — 99232 SBSQ HOSP IP/OBS MODERATE 35: CPT

## 2023-09-27 PROCEDURE — 97116 GAIT TRAINING THERAPY: CPT

## 2023-09-27 RX ADMIN — HYDRALAZINE HYDROCHLORIDE 10 MG: 10 TABLET, FILM COATED ORAL at 15:34

## 2023-09-27 RX ADMIN — AMLODIPINE BESYLATE 5 MG: 5 TABLET ORAL at 08:01

## 2023-09-27 RX ADMIN — PANTOPRAZOLE SODIUM 40 MG: 40 TABLET, DELAYED RELEASE ORAL at 06:06

## 2023-09-27 RX ADMIN — FERROUS GLUCONATE 324 MG: 324 TABLET ORAL at 06:06

## 2023-09-27 RX ADMIN — Medication 6 MG: at 20:33

## 2023-09-27 RX ADMIN — Medication 250 MG: at 08:01

## 2023-09-27 RX ADMIN — FOLIC ACID 1 MG: 1 TABLET ORAL at 08:01

## 2023-09-27 RX ADMIN — MAGNESIUM OXIDE TAB 400 MG (241.3 MG ELEMENTAL MG) 400 MG: 400 (241.3 MG) TAB at 08:00

## 2023-09-27 RX ADMIN — TORSEMIDE 20 MG: 20 TABLET ORAL at 08:01

## 2023-09-27 RX ADMIN — HEPARIN SODIUM 5000 UNITS: 5000 INJECTION INTRAVENOUS; SUBCUTANEOUS at 22:03

## 2023-09-27 RX ADMIN — SENNOSIDES 8.6 MG: 8.6 TABLET, FILM COATED ORAL at 08:01

## 2023-09-27 RX ADMIN — HYDRALAZINE HYDROCHLORIDE 10 MG: 10 TABLET, FILM COATED ORAL at 08:00

## 2023-09-27 RX ADMIN — HEPARIN SODIUM 5000 UNITS: 5000 INJECTION INTRAVENOUS; SUBCUTANEOUS at 13:06

## 2023-09-27 RX ADMIN — LORATADINE 10 MG: 10 TABLET ORAL at 22:03

## 2023-09-27 RX ADMIN — HEPARIN SODIUM 5000 UNITS: 5000 INJECTION INTRAVENOUS; SUBCUTANEOUS at 06:06

## 2023-09-27 RX ADMIN — TRAZODONE HYDROCHLORIDE 25 MG: 50 TABLET ORAL at 20:34

## 2023-09-27 RX ADMIN — AMLODIPINE BESYLATE 5 MG: 5 TABLET ORAL at 20:33

## 2023-09-27 RX ADMIN — OXYCODONE HYDROCHLORIDE AND ACETAMINOPHEN 500 MG: 500 TABLET ORAL at 08:00

## 2023-09-27 RX ADMIN — CITALOPRAM HYDROBROMIDE 20 MG: 20 TABLET ORAL at 08:01

## 2023-09-27 RX ADMIN — ATORVASTATIN CALCIUM 40 MG: 40 TABLET, FILM COATED ORAL at 22:03

## 2023-09-27 RX ADMIN — HYDRALAZINE HYDROCHLORIDE 10 MG: 10 TABLET, FILM COATED ORAL at 20:34

## 2023-09-27 RX ADMIN — Medication 250 MG: at 17:01

## 2023-09-27 RX ADMIN — FLUTICASONE PROPIONATE 1 SPRAY: 50 SPRAY, METERED NASAL at 08:00

## 2023-09-27 RX ADMIN — SODIUM BICARBONATE 650 MG TABLET 1300 MG: at 07:59

## 2023-09-27 RX ADMIN — OFLOXACIN 5 DROP: 3 SOLUTION OPHTHALMIC at 08:00

## 2023-09-27 RX ADMIN — SODIUM BICARBONATE 650 MG TABLET 1300 MG: at 16:45

## 2023-09-27 NOTE — ASSESSMENT & PLAN NOTE
Lab Results   Component Value Date    HGBA1C 6.3 06/05/2023       Recent Labs     09/25/23  1612 09/26/23  0554 09/26/23  1610 09/27/23  0610   POCGLU 194* 131 211* 132       Blood Sugar Average: Last 72 hrs:  (P) [id number]     Currently diet controlled at home. A1c 6.3 on 6/5/2023. Lashonda Phillips at home for renal protection. Continue BID accuchecks and cons. carb diet.

## 2023-09-27 NOTE — ASSESSMENT & PLAN NOTE
Noted to be in a-flutter in acute setting. ECHO obtained on 9/16: EF 50-55%, abnormal diastolic inflow due to atrial flutter, L atrium moderately dilated, and R atrium mildly dilated. Monitor routine VS.  Replete electrolytes as needed. Continue with magnesium oxide 400mg daily. Last EKG showed QTc of 526. Repeat EKG on 9/20 showed QTc of 499. Metoprolol discontinued. Currently not taking anticoagulation - [id number] score of 8. May benefit from anticoagulation - consider starting after follow-up with Neurosurgery. Follows with Dr. Chiquis Carvajal (Cardiology) as outpatient. Repeat EKG on 9/20 shows that patient is still in atrial flutter. Cardiology consulted. Hold AV node blockers. Consider anticoagulation once stable per Neurosurgery perspective. Follow-up with Cardiology as outpatient. No need for repeat ECHO at this time.

## 2023-09-27 NOTE — ASSESSMENT & PLAN NOTE
Lab Results   Component Value Date    EGFR 29 09/25/2023    EGFR 29 09/20/2023    EGFR 25 09/19/2023    CREATININE 2.05 (H) 09/25/2023    CREATININE 2.04 (H) 09/20/2023    CREATININE 2.29 (H) 09/19/2023     Creatinine currently 2.05. Baseline creatinine 2.5-2.8. Avoid nephrotoxins as able - losartan currently on hold. Receiving torsemide 20mg EOD. Ensure adequate hydration. Currently being seen by Vascular for possible fistula creation as OP - on hold due to cancer treatment. Continue sodium bicarbonate 1300mg BID.   Follows with Nephrology as outpatient - Dr. Maria M Go.

## 2023-09-27 NOTE — PROGRESS NOTES
09/27/23 1000   Pain Assessment   Pain Assessment Tool 0-10   Pain Score No Pain   Restrictions/Precautions   Precautions Fall Risk; Goins; Visual deficit  (+macular degeneration)   General   Change In Medical/Functional Status orthostatic BPs taken - negative without teds or binder, see vitals for details. Cognition   Overall Cognitive Status Impaired   Arousal/Participation Alert; Cooperative   Attention Attends with cues to redirect   Orientation Level Oriented X4   Memory Decreased short term memory   Following Commands Follows multistep commands with increased time or repetition   Subjective   Subjective sleeping upon entry, arousable, agreeable to PT claiming he sometimes does not get up until 10 at home.    Roll Left and Right   Type of Assistance Needed Supervision   Roll Left and Right CARE Score 4   Sit to Lying   Type of Assistance Needed Supervision   Sit to Lying CARE Score 4   Lying to Sitting on Side of Bed   Type of Assistance Needed Supervision   Lying to Sitting on Side of Bed CARE Score 4   Sit to Stand   Type of Assistance Needed Incidental touching   Physical Assistance Level No physical assistance   Comment CG/CS   Sit to Stand CARE Score 4   Bed-Chair Transfer   Type of Assistance Needed Incidental touching   Physical Assistance Level No physical assistance   Comment CG/CS with RW   Chair/Bed-to-Chair Transfer CARE Score 4   Walk 10 Feet   Type of Assistance Needed Supervision   Physical Assistance Level No physical assistance   Comment RW   Walk 10 Feet CARE Score 4   Walk 50 Feet with Two Turns   Type of Assistance Needed Incidental touching   Physical Assistance Level No physical assistance   Walk 50 Feet with Two Turns CARE Score 4   Walk 150 Feet   Type of Assistance Needed Incidental touching   Physical Assistance Level No physical assistance   Comment CS/CG with RW   Walk 150 Feet CARE Score 4   Walking 10 Feet on Uneven Surfaces   Type of Assistance Needed Incidental touching Physical Assistance Level No physical assistance   Comment on indoor ramp with RW   Walking 10 Feet on Uneven Surfaces CARE Score 4   Ambulation   Primary Mode of Locomotion Prior to Admission Walk   Distance Walked (feet) 250 ft  (150, 500)   Assist Device Roller Walker   Gait Pattern Slow Ifrah; Step through   Limitations Noted In Endurance;Speed   Provided Assistance with: Balance   Walk Assist Level Contact Guard;Close Supervision   Findings CS with RW, CGA without AD pending ftg/floor surface   Does the patient walk? 2. Yes   Wheel 50 Feet with Two Turns   Reason if not Attempted Activity not applicable   Wheel 50 Feet with Two Turns CARE Score 9   Wheel 150 Feet   Reason if not Attempted Activity not applicable   Wheel 542 Feet CARE Score 9   Wheelchair mobility   Does the patient use a wheelchair? 0. No   Curb or Single Stair   Style negotiated Curb   Type of Assistance Needed Incidental touching   Physical Assistance Level No physical assistance   Comment CG/CS for safety, good recall of sequencing. 1 Step (Curb) CARE Score 4   4 Steps   Type of Assistance Needed Supervision   Physical Assistance Level No physical assistance   4 Steps CARE Score 4   12 Steps   Type of Assistance Needed Supervision   Physical Assistance Level No physical assistance   Comment CS with B HR. (reports does not need FF for DC home - however does have loft and basement he will use rarely)   12 Steps CARE Score 4   Stairs   Type Stairs   # of Steps 12   Weight Bearing Precautions Fall Risk   Assist Devices Bilateral Rail   Therapeutic Interventions   Strengthening Seated B LE TE: APs on 4inch block x30; grn tband hip abd and HS curls, ball squeeze, 2# LAQ x30 each.    Balance amb without AD on level surface, amb without AD over mat on floor, then over mat on floor with items under to mimic uneven surface CGA 4x20ft   Assessment   Treatment Assessment Pt engaged and tolerated well 90 min skilled PT interventions, demonstrating negative orthostatic hypotension. BP inc with mobility, no TEDs or binder. See vitals for details. Pt able to inc amb distance with RW, verbalizes benefits of RW use. Trials without AD on level and uneven ground for ongoing unsupported balance training. +visual deficit pt with h/o macular degeneration. Pt making good progress towards set S goals, would benefit from short FT to update spouse on pt status and mobility. Anticipate DC home in nest few days pending medical stability and agreement. Family/Caregiver Present no   Problem List Decreased strength;Decreased range of motion;Decreased endurance; Impaired balance;Decreased mobility; Decreased cognition; Impaired vision   Barriers to Discharge Inaccessible home environment   Barriers to Discharge Comments NANO   Plan   Treatment/Interventions Functional transfer training;LE strengthening/ROM; Elevations; Therapeutic exercise; Endurance training;Patient/family training;Gait training   Progress Progressing toward goals   Recommendation   PT Discharge Recommendation Home with home health rehabilitation   PT Equipment ordered pt has RW, OT to order [facility]   PT Therapy Minutes   PT Time In 1000   PT Time Out 1130   PT Total Time (minutes) 90   PT Mode of treatment - Individual (minutes) 90   PT Mode of treatment - Concurrent (minutes) 0   PT Mode of treatment - Group (minutes) 0   PT Mode of treatment - Co-treat (minutes) 0   PT Mode of Treatment - Total time(minutes) 90 minutes   PT Cumulative Minutes 540   Therapy Time missed   Time missed?  No

## 2023-09-27 NOTE — ASSESSMENT & PLAN NOTE
[address] on 9/16 showed chronic appearing R caudate lacunar infarct. Currently on Lipitor 40mg daily. Would benefit from starting antiplatelet - would need to discuss with Neurosurgery at 2 week follow-up. Continue with PT/OT.

## 2023-09-27 NOTE — ASSESSMENT & PLAN NOTE
Wound care c/s    "Wound to right arm is partial thickness with beefy red fragile tissue with scant sanguineous drainage. Periwound skin is fragile.    Plan:   Clean with NSS, pad dry completely, apply xeroform only over wound area. Cover with DSD and Riky Jock;  Change Daily."

## 2023-09-27 NOTE — ASSESSMENT & PLAN NOTE
Experienced a-flutter in acute setting. ECHO on 9/16 showed EF 50-55%, abnormal diastolic inflow due to atrial flutter, L atrium moderately dilated, and R atrium mildly dilated. Cardiology consulted and following. No need for repeat ECHO inpatient. Will obtain as OP if patient continues to be in a-flutter. Follows with Dr. Denisse Martinez (Cardiology) as outpatient.

## 2023-09-27 NOTE — ASSESSMENT & PLAN NOTE
Hgb currently 10.0. Had been receiving ferrous sulfate as OP. Vitamin B12 1,238 on 9/20. Iron panel completed on 9/20 and showed iron sat 14%, TIBC 215, and iron 30. Ferritin 131. Folate 8. Started on folic acid 1mg daily, ferrous gluconate 324mg daily, and vitamin C 500mg daily on 9/21. Currently asymptomatic. Continue to trend routine CBC.

## 2023-09-27 NOTE — ASSESSMENT & PLAN NOTE
Worsening R sided headache on 9/21 along with hypertension. 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 Trace midline shift to the left. Stable hyperdense hemorrhages seen in the right subarachnoid/subdural along the right frontal and right temporal regions. No new area of acute hemorrhage. Stable left frontal interior contusions. Stable ventricular size and stable volume of the hyperdense layering blood products in the occipital horns. Neurosurgery recommending f/u CTH in 1 week. Discuss embo at that time. Neurovascular checks Q shift. Repeat CTH with any acute changes. Wound Care: Petrolatum Lab Facility: [id number] Additional Anesthesia Volume In Cc (Will Not Render If 0): 0 Size Of Lesion In Cm: 1.5 Cryotherapy Text: The wound bed was treated with cryotherapy after the biopsy was performed. Anesthesia Type: 2% lidocaine with epinephrine Destruction After The Procedure: No Consent: Verbal consent was obtained and risks were reviewed including but not limited to scarring, infection, bleeding, scabbing, incomplete removal, nerve damage and allergy to anesthesia. Lab: [id number] Billing Type: Third-Party Bill Biopsy Type: H and E Type Of Destruction Used: Curettage Electrodesiccation Text: The wound bed was treated with electrodesiccation after the biopsy was performed. X Size Of Lesion In Cm: 1.1 Depth Of Biopsy: dermis Was A Bandage Applied: Yes Post-Care Instructions: I reviewed with the patient in detail post-care instructions. Patient is to keep the biopsy site dry overnight, and then apply bacitracin twice daily until healed. Patient may apply hydrogen peroxide soaks to remove any crusting. Dressing: Band-Aid Electrodesiccation And Curettage Text: The wound bed was treated with electrodesiccation and curettage after the biopsy was performed. Anesthesia Volume In Cc (Will Not Render If 0): 0.2 Curettage Text: The wound bed was treated with curettage after the biopsy was performed. Biopsy Method: Dermablade Detail Level: Detailed Notification Instructions: Patient will be notified of biopsy results. However, patient instructed to call the office if not contacted within 2 weeks. Silver Nitrate Text: The wound bed was treated with silver nitrate after the biopsy was performed. Hemostasis: Electrocautery Billing Type: Third-Party Bill X Size Of Lesion In Cm: 0.4 Lab: [id number] Size Of Lesion In Cm: 0.3 Lab Facility: [id number]

## 2023-09-27 NOTE — PROGRESS NOTES
09/27/23 1235   Pain Assessment   Pain Assessment Tool 0-10   Pain Score No Pain   Restrictions/Precautions   Precautions Fall Risk; Sweet; Visual deficit  (+MD, + illeal conduit to sweet bag, RUE skin tear)   Braces or Orthoses   (B TEDs and ABD binder (not utilized this date as BPs have been stable))   Lifestyle   Autonomy "I feel like I'm at my normal"   Oral Hygiene   Type of Assistance Needed Supervision   Physical Assistance Level No physical assistance   Comment sup standing at sink w/ RW to brush teeth. No LOB nor safety awareness concers, pt progressing towards Mod I. Oral Hygiene CARE Score 4   Shower/Bathe Self   Comment ADL scheduled this date, however pt deferring d/t PM session and preferring morning ADLs, please complete shower tomorrow. Tub/Shower Transfer   Findings Simulated step over shower transfer using wall to support while side stepping over bolster. Pt reporting this to be identical to home set up, except at home he has a vertical GB to hold onto oustide of shower. Pt recepetive to edu on safe technique side step vs anterior step. Pt completes w/ CS and good carryover of technique. Trialed x3. Putting On/Taking Off Footwear   Type of Assistance Needed Independent   Physical Assistance Level No physical assistance   Comment TEDs not requried this date 2' stable BPs. Pt reports at home he completes footwear from low seat. Trialed activity using low chair in multipurpose space and foot propped on 1" block, pt able to complete don/doff sock IND but requires inc time d/t dec fxnl reach. Putting On/Taking Off Footwear CARE Score 6   Sit to Lying   Type of Assistance Needed Supervision   Sit to Lying CARE Score 4   Sit to Stand   Type of Assistance Needed Supervision   Comment sup to RW.  Also CS from low armed chair in multipurpose space to RW   Sit to Stand CARE Score 4   Toileting Hygiene   Type of Assistance Needed Supervision   Comment CS in stance for hygiene following BM and CM up/down over hips   Toileting Hygiene CARE Score 4   Toilet Transfer   Type of Assistance Needed Supervision   Comment CS w/ RW, cue to use GB on descent instead of B hands on RW, pt has R side GB at home. Toilet Transfer CARE Score 4   Toilet Transfer   Findings home set up: standard toilet w/ R slide sloped GB. Following coversation w/ pt and wife from earlier date, [facility] to be ordered for use at home as well. Transfers   Additional Comments fxnl mobility w/ RW CS in hallways   Exercise Tools   Other Exercise Tool 1 UE HEP utilizing R theraband to perform 3 sets of 10 reps of indicated planes. Pt tolerated with rest breaks between each set. EDU provided on focus of HEP to maximize UE strength upon D/C. Pt is receptive to completing UE HEP daily upon D/C. Cognition   Overall Cognitive Status Impaired   Arousal/Participation Alert; Cooperative   Attention Attends with cues to redirect   Orientation Level Oriented X4   Memory Decreased short term memory   Following Commands Follows multistep commands with increased time or repetition   Activity Tolerance   Activity Tolerance Patient tolerated treatment well   Other Comments   Assessment Manual BP RUE. seated 148/64, stance 152/64. TEDs and ABD binder NOT utilized this date. Assessment   Treatment Assessment OT session focusing on simulated shower transfers, trial of footwear tasks, UE TE, toileting tasks, fxnl mob w/ RW. Pt progressing towards goals in all areas. Shower to be completed tomorrow morning w/ OT to assess current ADL levels, but anticpate pt to be at or near OT goals of Mod I-sup. OT Family training done with: Provided pt w/ time for PT session tomorrow, hoping that wife will be able to be present for portion of PT session for FT. OT FT already completed at earlier date. Problem List Decreased strength;Decreased endurance;Decreased range of motion; Impaired balance;Decreased mobility; Decreased coordination;Decreased cognition; Impaired vision;Decreased skin integrity   Plan   Treatment/Interventions ADL retraining;Functional transfer training; Therapeutic exercise; Endurance training;Cognitive reorientation;Patient/family training;Equipment eval/education; Compensatory technique education;Continued evaluation   Progress Progressing toward goals   Recommendation   Equipment Recommended Bedside commode  (Pt has RW and tub bench for D/C.)   Commode Type Standard   OT Therapy Minutes   OT Time In 1235   OT Time Out 1405   OT Total Time (minutes) 90   OT Mode of treatment - Individual (minutes) 90   OT Mode of treatment - Concurrent (minutes) 0   OT Mode of treatment - Group (minutes) 0   OT Mode of treatment - Co-treat (minutes) 0   OT Mode of Treatment - Total time(minutes) 90 minutes   OT Cumulative Minutes 765   Therapy Time missed   Time missed?  No

## 2023-09-27 NOTE — ASSESSMENT & PLAN NOTE
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 Underwent radical cystoprostatectomy with ileal conduit at [facility]. Recurrence in 2022 and was started chemotherapy. Currently receiving Avelunab and aranesp for 3 months. Follows with Dr. Ly Alas (Hem/Onc) as outpatient - due for appt on 9/26. Ordered PET scan for 9/19 but currently inpatient. CT chest/abd/pelvis in acute setting showed multiple concerns for metastasis. Follow-up with Hem/Onc as outpatient.

## 2023-09-27 NOTE — ASSESSMENT & PLAN NOTE
Pneumocephalus within the sinus near the area of the fracture seen on CT. Consider obtaining CTA/CTV if concerns for sinus thrombosis. negative...

## 2023-09-27 NOTE — ASSESSMENT & PLAN NOTE
Difficulty sleeping in the hospital setting. Currently on melatonin 6mg at . Started on trazodone 50mg at [facility] per Primary team on 9/25. Obtain sleep logs.

## 2023-09-27 NOTE — ASSESSMENT & PLAN NOTE
Home regimen: losartan 25mg daily, metoprolol tartrate 25mg Q12, and torsemide 20mg EOD. Currently receiving amlodipine 5mg daily, torsemide 20mg EOD, and hydralazine 10mg TID. Metoprolol currently on hold due to prolonged QTc and bradycardia. Maintain SBP <160 due to recent intracranial bleed and >140 due to renal function. Continue to monitor BP with routine VS.  Follows with Dr. Lola García (Cardiology) and Dr. Pattie Burdick (Nephrology) as outpatient.

## 2023-09-27 NOTE — PLAN OF CARE
Problem: NEUROSENSORY - ADULT  Goal: Achieves stable or improved neurological status  Description: INTERVENTIONS  - Monitor and report changes in neurological status  - Monitor vital signs such as temperature, blood pressure, glucose, and any other labs ordered   - Initiate measures to prevent increased intracranial pressure  - Monitor for seizure activity and implement precautions if appropriate      Outcome: Progressing  Goal: Remains free of injury related to seizures activity  Description: INTERVENTIONS  - Maintain airway, patient safety  and administer oxygen as ordered  - Monitor patient for seizure activity, document and report duration and description of seizure to physician/advanced practitioner  - If seizure occurs,  ensure patient safety during seizure  - Reorient patient post seizure  - Seizure pads on all 4 side rails  - Instruct patient/family to notify RN of any seizure activity including if an aura is experienced  - Instruct patient/family to call for assistance with activity based on nursing assessment  - Administer anti-seizure medications if ordered    Outcome: Progressing  Goal: Achieves maximal functionality and self care  Description: INTERVENTIONS  - Monitor swallowing and airway patency with patient fatigue and changes in neurological status  - Encourage and assist patient to increase activity and self care.    - Encourage visually impaired, hearing impaired and aphasic patients to use assistive/communication devices  Outcome: Progressing     Problem: CARDIOVASCULAR - ADULT  Goal: Maintains optimal cardiac output and hemodynamic stability  Description: INTERVENTIONS:  - Monitor I/O, vital signs and rhythm  - Monitor for S/S and trends of decreased cardiac output  - Administer and titrate ordered vasoactive medications to optimize hemodynamic stability  - Assess quality of pulses, skin color and temperature  - Assess for signs of decreased coronary artery perfusion  - Instruct patient to report change in severity of symptoms  Outcome: Progressing  Goal: Absence of cardiac dysrhythmias or at baseline rhythm  Description: INTERVENTIONS:  - Continuous cardiac monitoring, vital signs, obtain 12 lead EKG if ordered  - Administer antiarrhythmic and heart rate control medications as ordered  - Monitor electrolytes and administer replacement therapy as ordered  Outcome: Progressing

## 2023-09-27 NOTE — PROGRESS NOTES
[address] [facility]  Progress Note  Name: Lavinia Shelton  MRN: [id number]  Unit/Bed#: -95 I Date of Admission: 9/19/2023   Date of Service: 9/27/2023 I Hospital Day: 8    Assessment/Plan   Snoring  Assessment & Plan  Wife has noted that patient snores at HS. Obtaining overnight noc ox on 9/22 to determine if patient has sleep apnea. Study showed desat for less than 2 minutes with desat as low as 81%. No need for O2 on discharge. Acute headache  Assessment & Plan  Worsening R sided headache on 9/21 along with hypertension. 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 Trace midline shift to the left. Stable hyperdense hemorrhages seen in the right subarachnoid/subdural along the right frontal and right temporal regions. No new area of acute hemorrhage. Stable left frontal interior contusions. Stable ventricular size and stable volume of the hyperdense layering blood products in the occipital horns. Neurosurgery recommending f/u CTH in 1 week. Discuss embo at that time. Neurovascular checks Q shift. Repeat CTH with any acute changes. Insomnia  Assessment & Plan  Difficulty sleeping in the hospital setting. Currently on melatonin 6mg at HS. Started on trazodone 50mg at [facility] per Primary team on 9/25. Obtain sleep logs. Sinus congestion  Assessment & Plan  Chronic. Uses over the counter nasal sprays and antihistamines. Started on Claritin and Flonase on 9/20. Encouraged to follow-up with ENT as outpatient. GERD (gastroesophageal reflux disease)  Assessment & Plan  Continue Protonix 40mg daily as substitute for non-formulary omeprazole. Started on PRN Tums due to complaints of indigestion. Leukocytosis  Assessment & Plan  WBC count currently 12.97 from 11.99. Chronically elevated as outpatient. Currently no active s/s of infection. Received Unasyn due to open fracture.   UA obtained on 9/19 - negative for infection. Continue to trend routine CBC. Atrial flutter [facility])  Assessment & Plan  Noted to be in a-flutter in acute setting. ECHO obtained on 9/16: EF 50-55%, abnormal diastolic inflow due to atrial flutter, L atrium moderately dilated, and R atrium mildly dilated. Monitor routine VS.  Replete electrolytes as needed. Continue with magnesium oxide 400mg daily. Last EKG showed QTc of 526. Repeat EKG on 9/20 showed QTc of 499. Metoprolol discontinued. Currently not taking anticoagulation - [id number] score of 8. May benefit from anticoagulation - consider starting after follow-up with Neurosurgery. Follows with Dr. Brayden Batres (Cardiology) as outpatient. Repeat EKG on 9/20 shows that patient is still in atrial flutter. Cardiology consulted. Hold AV node blockers. Consider anticoagulation once stable per Neurosurgery perspective. Follow-up with Cardiology as outpatient. No need for repeat ECHO at this time. Abnormal findings on imaging test  Assessment & Plan  CTA chest/abd/pelvis on 9/15 with multiple concerning lymph nodes/lesions for metastasis including: nodule at the L obturator internus muscle, L sided mesenteric lymph nodes, R external iliac lymph node, R sided retroperitoneal lymph nodes, hypodense structure along with R pelvic sidewall, lesions in the R inferior hepatic lobe, peritoneal soft tissue nodularity, R cardiophrenic lymph node, 2 enlarged retrocrural lymph nodes, and R middle lobe nodule. Follow-up with Hematology/Oncology as outpatient. History of stroke  Assessment & Plan  CTH on 9/16 showed chronic appearing R caudate lacunar infarct. Currently on Lipitor 40mg daily. Would benefit from starting antiplatelet - would need to discuss with Neurosurgery at 2 week follow-up. Continue with PT/OT. Pneumocephalus  Assessment & Plan  Pneumocephalus within the sinus near the area of the fracture seen on CT.   Consider obtaining CTA/CTV if concerns for sinus thrombosis. SDH (subdural hematoma) ([facility])  Assessment & Plan  S/p fall on 9/15. [address] on 9/15 showed small foci of subdural hemorrhage in the R insular region and temporal region without significant mass effect. Repeat CTH on 9/17 stable. Changes noted on repeat CTH on 9/21. Neurosurgery follow-up this week. Currently holding AC/AP. Has been restarted on DVT ppx per Neurosurgery. Neurovascular checks Q shift. Maintain SBP <160. Completed Keppra for 7 days for seizure ppx. Follow-up with Neurosurgery in 2 weeks with repeat CTH (9/29). Primary team following. PT/OT. Fracture of temporal bone [facility])  Assessment & Plan  S/p fall on 9/15. CTH showed hematoma in the L parietal vertex and L occipital region, non-displaced fracture through the R temporal bone, linear non-displaced fracture through the posterior wall of the condyle with middle ear hemorrhage, fracture passes through the hypotympanum, non-displaced fracture through the R occipital bone. ENT consulted in acute setting - follow-up 1 week after discharge for audiogram.  No surgical intervention needed at this time. Completed Unasyn for 7 day course for open fracture on 9/22. Completed Floxin drops to R ear for 10 days total on 9/25. Consider CTA/CTV as needed if concerns for sinus thrombosis given pneumocephalus within the sinus near the area of the fracture. Had not been done inpatient due to concerns of frequent contrast administration with CKD stage 4. Anemia due to stage 4 chronic kidney disease ([facility])  Assessment & Plan  Hgb currently 10.0. Had been receiving ferrous sulfate as OP. Vitamin B12 1,238 on 9/20. Iron panel completed on 9/20 and showed iron sat 14%, TIBC 215, and iron 30. Ferritin 131. Folate 8. Started on folic acid 1mg daily, ferrous gluconate 324mg daily, and vitamin C 500mg daily on 9/21. Currently asymptomatic. Continue to trend routine CBC.     Stage 4 chronic kidney disease [facility])  Assessment & Plan  Lab Results   Component Value Date    EGFR 29 09/25/2023    EGFR 29 09/20/2023    EGFR 25 09/19/2023    CREATININE 2.05 (H) 09/25/2023    CREATININE 2.04 (H) 09/20/2023    CREATININE 2.29 (H) 09/19/2023     Creatinine currently 2.05. Baseline creatinine 2.5-2.8. Avoid nephrotoxins as able - losartan currently on hold. Receiving torsemide 20mg EOD. Ensure adequate hydration. Currently being seen by Vascular for possible fistula creation as OP - on hold due to cancer treatment. Continue sodium bicarbonate 1300mg BID. Follows with Nephrology as outpatient - Dr. Karo Montenegro.    Benign hypertension with chronic kidney disease, stage IV [facility])  Assessment & Plan  Home regimen: losartan 25mg daily, metoprolol tartrate 25mg Q12, and torsemide 20mg EOD. Currently receiving amlodipine 5mg daily, torsemide 20mg EOD, and hydralazine 10mg TID. Metoprolol currently on hold due to prolonged QTc and bradycardia. Maintain SBP <160 due to recent intracranial bleed and >140 due to renal function. Continue to monitor BP with routine VS.  Follows with Dr. Denisse Martinez (Cardiology) and Dr. Karo Montenegro (Nephrology) as outpatient. Anxiety and depression  Assessment & Plan  Continue home Celexa 20mg daily. Supportive counseling as needed. Consider Neuropsych consult as needed. Ischemic cardiomyopathy  Assessment & Plan  Experienced a-flutter in acute setting. ECHO on 9/16 showed EF 50-55%, abnormal diastolic inflow due to atrial flutter, L atrium moderately dilated, and R atrium mildly dilated. Cardiology consulted and following. No need for repeat ECHO inpatient. Will obtain as OP if patient continues to be in a-flutter. Follows with Dr. Denisse Martinez (Cardiology) as outpatient. Coronary artery disease involving native coronary artery of native heart without angina pectoris  Assessment & Plan  Hx of CABG. Continue Lipitor 40mg daily. Metoprolol on hold currently due to bradycardia and prolonged QTc.   Does not take ASA as outpatient - may benefit due to hx of CAD and possible stroke seen on CT. Recommend discussing AP/AC use at 2 week follow-up with Neurosurgery. Elevated troponins in acute setting - felt to be type II MI in setting of trauma. 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 Underwent radical cystoprostatectomy with ileal conduit at [facility]. Recurrence in 2022 and was started chemotherapy. Currently receiving Avelunab and aranesp for 3 months. Follows with Dr. Jesica Smalls (Hem/Onc) as outpatient - due for appt on 9/26. Ordered PET scan for 9/19 but currently inpatient. CT chest/abd/pelvis in acute setting showed multiple concerns for metastasis. Follow-up with Hem/Onc as outpatient. Hyperlipidemia  Assessment & Plan  Continue home Lipitor 40mg daily. DMII (diabetes mellitus, type 2) [facility])  Assessment & Plan  Lab Results   Component Value Date    HGBA1C 6.3 06/05/2023       Recent Labs     09/25/23  1612 09/26/23  0554 09/26/23  1610 09/27/23  0610   POCGLU 194* 131 211* 132       Blood Sugar Average: Last 72 hrs:  (P) [id number]     Currently diet controlled at home. A1c 6.3 on 6/5/2023. Cristhian Brace at home for renal protection. Continue BID accuchecks and cons. carb diet. * SAH (subarachnoid hemorrhage) ([address])  Assessment & Plan  S/p fall on 9/15. [facility] on 9/15 showed multicompartmental hemorrhage with small foci of subarachnoid hemorrhage in the bilateral inferior frontal region and additional small foci of subarachnoid hemorrhage. Repeat CTH on 9/17 stable. Changes noted on repeat CTH on 9/21. Neurosurgery follow-up this week. Hold AC/AP. Has been restarted on DVT ppx per Neurosurgery. Neurovascular checks Q shift. Maintain SBP <160. Completed Keppra for 7 days for seizure ppx. Follow-up with Neurovascular in 2 weeks with repeat CTH (9/29). Primary team following. PT/OT. VTE Pharmacologic Prophylaxis:   Pharmacologic: Heparin  Mechanical VTE Prophylaxis in Place: Yes - sequential compression devices. Current Length of Stay: 8 day(s)    Current Patient Status: Inpatient Rehab     Discharge Plan: As per primary team.    Code Status: Level 1 - Full Code    Subjective:   Pt examined while pt sitting in recliner in pt room. States that he slept very well again last night. Denies any headaches or lightheadedness. Has occasional vertigo when he first sits up when getting OOB but states that it is very brief and resolves on its own. Still has muffled hearing from the R ear. Denies any palpitations, CP, or SOB. Had a BM yesterday without any issues. Currently has no new concerns or complaints. Objective:     Vitals:   Temp (24hrs), Av.9 °F (36.6 °C), Min:97.5 °F (36.4 °C), Max:98.5 °F (36.9 °C)    Temp:  [97.5 °F (36.4 °C)-98.5 °F (36.9 °C)] 97.6 °F (36.4 °C)  HR:  [62-68] 62  Resp:  [18] 18  BP: (145-160)/(60-80) 145/68  SpO2:  [95 %-96 %] 96 %  Body mass index is 28.97 kg/m². Review of Systems   Constitutional: Negative for appetite change, chills, fatigue and fever. HENT: Negative for ear discharge, ear pain, facial swelling and trouble swallowing. Muffled hearing in the R ear, unchanged   Eyes: Negative for visual disturbance. Respiratory: Negative for cough, shortness of breath, wheezing and stridor. Cardiovascular: Negative for chest pain, palpitations and leg swelling. Gastrointestinal: Negative for abdominal distention, abdominal pain, constipation, diarrhea, nausea and vomiting. LBM    Genitourinary: Negative for difficulty urinating. Musculoskeletal: Negative for arthralgias, back pain and gait problem. Neurological: Positive for dizziness (experiences dizziness when first sitting at the side of the bed, improves on its own, unchanged). Negative for weakness, light-headedness, numbness and headaches.    Psychiatric/Behavioral: Negative for dysphoric mood and sleep disturbance. The patient is not nervous/anxious. All other systems reviewed and are negative. Input and Output Summary (last 24 hours): Intake/Output Summary (Last 24 hours) at 9/27/2023 0930  Last data filed at 9/27/2023 0615  Gross per 24 hour   Intake 1020 ml   Output 1175 ml   Net -155 ml       Physical Exam:     Physical Exam  Vitals and nursing note reviewed. Constitutional:       General: He is not in acute distress. Appearance: Normal appearance. He is not ill-appearing. HENT:      Head: Normocephalic and atraumatic. Cardiovascular:      Rate and Rhythm: Normal rate and regular rhythm. Pulses: Normal pulses. Heart sounds: Normal heart sounds. No murmur heard. No friction rub. Pulmonary:      Effort: Pulmonary effort is normal. No respiratory distress. Breath sounds: Normal breath sounds. No wheezing or rhonchi. Abdominal:      General: Abdomen is flat. Bowel sounds are normal. There is no distension. Palpations: Abdomen is soft. There is no mass. Tenderness: There is no abdominal tenderness. There is no guarding or rebound. Hernia: No hernia is present. Genitourinary:     Comments: Ileal conduit, draining clear, yellow urine. Musculoskeletal:      Cervical back: Normal range of motion and neck supple. No tenderness. Right lower leg: No edema. Left lower leg: No edema. Skin:     General: Skin is warm and dry. Capillary Refill: Capillary refill takes less than 2 seconds. Neurological:      Mental Status: He is alert and oriented to person, place, and time.    Psychiatric:         Mood and Affect: Mood normal.         Behavior: Behavior normal.         Additional Data:     Labs:    Results from last 7 days   Lab Units 09/25/23  0523   WBC Thousand/uL 12.97*   HEMOGLOBIN g/dL 10.0*   HEMATOCRIT % 31.7*   PLATELETS Thousands/uL 223   NEUTROS PCT % 80*   LYMPHS PCT % 7*   MONOS PCT % 10   EOS PCT % 1     Results from last 7 days   Lab Units 09/25/23  0523   SODIUM mmol/L 135   POTASSIUM mmol/L 3.6   CHLORIDE mmol/L 102   CO2 mmol/L 22   BUN mg/dL 27*   CREATININE mg/dL 2.05*   ANION GAP mmol/L 11   CALCIUM mg/dL 8.7   GLUCOSE RANDOM mg/dL 142*         Results from last 7 days   Lab Units 09/27/23  0610 09/26/23  1610 09/26/23  0554 09/25/23  1612 09/25/23  0605 09/24/23  1623 09/24/23  0600 09/23/23  1631 09/23/23  0607 09/22/23  1626 09/22/23  0626 09/21/23  1634   POC GLUCOSE mg/dl 132 211* 131 194* 139 199* 116 155* 120 125 115 118               Labs reviewed    Imaging:    Imaging reviewed    Recent Cultures (last 7 days):           Last 24 Hours Medication List:   Current Facility-Administered Medications   Medication Dose Route Frequency Provider Last Rate   • acetaminophen  650 mg Oral Q6H PRN Nithya Pratt MD     • amLODIPine  5 mg Oral BID LUKE Gray     • ascorbic acid  500 mg Oral Daily LUKE Gray     • atorvastatin  40 mg Oral HS Nithya Pratt MD     • bisacodyl  10 mg Rectal Daily PRN Nithya Pratt MD     • calcium carbonate  500 mg Oral Daily PRN LUKE Gray     • citalopram  20 mg Oral Daily Nithya Pratt MD     • ferrous gluconate  324 mg Oral Daily Before Breakfast LUKE Gray     • fluticasone  1 spray Each Nare Daily LUKE Gray     • folic acid  1 mg Oral Daily LUKE Gray     • heparin (porcine)  5,000 Units Subcutaneous [facility] Nithya Pratt MD     • hydrALAZINE  10 mg Oral Q8H PRN LUKE Gray     • hydrALAZINE  10 mg Oral TID LUKE Gray     • loratadine  10 mg Oral HS LUKE Gray     • magnesium Oxide  400 mg Oral Daily LUKE Gray     • melatonin  6 mg Oral HS Nithya Pratt MD     • ofloxacin  5 drop Otic Daily Nithya Pratt MD     • oxyCODONE  2.5 mg Oral Q8H PRN Nithya Pratt MD     • pantoprazole  40 mg Oral Early Morning Nithya Pratt MD     • polyethylene glycol  17 g Oral Daily PRN Gloria Barnett MD     • saccharomyces boulardii  250 mg Oral BID LUKE Vazquez     • senna  1 tablet Oral BID Gloria Barnett MD     • sodium bicarbonate  1,300 mg Oral BID after meals Gloria Barnett MD     • torsemide  20 mg Oral Every Other Day Gloria Barnett MD     • traZODone  25 mg Oral HS Gloria Barnett MD     • trimethobenzamide  200 mg Intramuscular Q6H PRN LUKE Vazquez          M*Modal software was used to dictate this note. It may contain errors with dictating incorrect words or incorrect spelling. Please contact the provider directly with any questions.

## 2023-09-28 ENCOUNTER — TELEPHONE (OUTPATIENT)
Dept: NEUROSURGERY | Facility: CLINIC | Age: 83
End: 2023-09-28

## 2023-09-28 ENCOUNTER — HOME HEALTH ADMISSION (OUTPATIENT)
Dept: HOME HEALTH SERVICES | Facility: HOME HEALTHCARE | Age: 83
End: 2023-09-28
Payer: MEDICARE

## 2023-09-28 LAB
ANION GAP SERPL CALCULATED.3IONS-SCNC: 13 MMOL/L
BASOPHILS # BLD AUTO: 0.06 THOUSANDS/ÂΜL (ref 0–0.1)
BASOPHILS NFR BLD AUTO: 1 % (ref 0–1)
BUN SERPL-MCNC: 32 MG/DL (ref 5–25)
CALCIUM SERPL-MCNC: 8.7 MG/DL (ref 8.4–10.2)
CHLORIDE SERPL-SCNC: 103 MMOL/L (ref 96–108)
CO2 SERPL-SCNC: 23 MMOL/L (ref 21–32)
CREAT SERPL-MCNC: 2.38 MG/DL (ref 0.6–1.3)
EOSINOPHIL # BLD AUTO: 0.31 THOUSAND/ÂΜL (ref 0–0.61)
EOSINOPHIL NFR BLD AUTO: 3 % (ref 0–6)
ERYTHROCYTE [DISTWIDTH] IN BLOOD BY AUTOMATED COUNT: 17.7 % (ref 11.6–15.1)
GFR SERPL CREATININE-BSD FRML MDRD: 24 ML/MIN/1.73SQ M
GLUCOSE SERPL-MCNC: 123 MG/DL (ref 65–140)
GLUCOSE SERPL-MCNC: 129 MG/DL (ref 65–140)
GLUCOSE SERPL-MCNC: 156 MG/DL (ref 65–140)
HCT VFR BLD AUTO: 32.4 % (ref 36.5–49.3)
HGB BLD-MCNC: 10.3 G/DL (ref 12–17)
IMM GRANULOCYTES # BLD AUTO: 0.16 THOUSAND/UL (ref 0–0.2)
IMM GRANULOCYTES NFR BLD AUTO: 1 % (ref 0–2)
LYMPHOCYTES # BLD AUTO: 1.06 THOUSANDS/ÂΜL (ref 0.6–4.47)
LYMPHOCYTES NFR BLD AUTO: 9 % (ref 14–44)
MCH RBC QN AUTO: 29.9 PG (ref 26.8–34.3)
MCHC RBC AUTO-ENTMCNC: 31.8 G/DL (ref 31.4–37.4)
MCV RBC AUTO: 94 FL (ref 82–98)
MONOCYTES # BLD AUTO: 1.35 THOUSAND/ÂΜL (ref 0.17–1.22)
MONOCYTES NFR BLD AUTO: 11 % (ref 4–12)
NEUTROPHILS # BLD AUTO: 9.19 THOUSANDS/ÂΜL (ref 1.85–7.62)
NEUTS SEG NFR BLD AUTO: 75 % (ref 43–75)
NRBC BLD AUTO-RTO: 0 /100 WBCS
PLATELET # BLD AUTO: 232 THOUSANDS/UL (ref 149–390)
PMV BLD AUTO: 12.1 FL (ref 8.9–12.7)
POTASSIUM SERPL-SCNC: 4 MMOL/L (ref 3.5–5.3)
RBC # BLD AUTO: 3.45 MILLION/UL (ref 3.88–5.62)
SODIUM SERPL-SCNC: 139 MMOL/L (ref 135–147)
WBC # BLD AUTO: 12.13 THOUSAND/UL (ref 4.31–10.16)

## 2023-09-28 PROCEDURE — 99233 SBSQ HOSP IP/OBS HIGH 50: CPT

## 2023-09-28 PROCEDURE — 97535 SELF CARE MNGMENT TRAINING: CPT

## 2023-09-28 PROCEDURE — 99232 SBSQ HOSP IP/OBS MODERATE 35: CPT | Performed by: INTERNAL MEDICINE

## 2023-09-28 PROCEDURE — 97530 THERAPEUTIC ACTIVITIES: CPT

## 2023-09-28 PROCEDURE — 97129 THER IVNTJ 1ST 15 MIN: CPT

## 2023-09-28 PROCEDURE — 82948 REAGENT STRIP/BLOOD GLUCOSE: CPT

## 2023-09-28 PROCEDURE — 97130 THER IVNTJ EA ADDL 15 MIN: CPT

## 2023-09-28 PROCEDURE — 80048 BASIC METABOLIC PNL TOTAL CA: CPT | Performed by: NURSE PRACTITIONER

## 2023-09-28 PROCEDURE — 85025 COMPLETE CBC W/AUTO DIFF WBC: CPT | Performed by: NURSE PRACTITIONER

## 2023-09-28 PROCEDURE — 97110 THERAPEUTIC EXERCISES: CPT

## 2023-09-28 PROCEDURE — 97116 GAIT TRAINING THERAPY: CPT

## 2023-09-28 RX ORDER — DOXYCYCLINE HYCLATE 50 MG/1
324 CAPSULE, GELATIN COATED ORAL
Qty: 30 TABLET | Refills: 0 | Status: SHIPPED | OUTPATIENT
Start: 2023-09-29

## 2023-09-28 RX ORDER — LANOLIN ALCOHOL/MO/W.PET/CERES
6 CREAM (GRAM) TOPICAL
Refills: 0
Start: 2023-09-28

## 2023-09-28 RX ORDER — LORATADINE 10 MG/1
10 TABLET ORAL DAILY PRN
Qty: 30 TABLET | Refills: 0 | Status: SHIPPED | OUTPATIENT
Start: 2023-09-28

## 2023-09-28 RX ORDER — AMLODIPINE BESYLATE 5 MG/1
5 TABLET ORAL 2 TIMES DAILY
Qty: 60 TABLET | Refills: 0 | Status: SHIPPED | OUTPATIENT
Start: 2023-09-28

## 2023-09-28 RX ORDER — TRAZODONE HYDROCHLORIDE 50 MG/1
25 TABLET ORAL
Qty: 30 TABLET | Refills: 0 | Status: SHIPPED | OUTPATIENT
Start: 2023-09-28

## 2023-09-28 RX ORDER — FLUTICASONE PROPIONATE 50 MCG
1 SPRAY, SUSPENSION (ML) NASAL DAILY
Qty: 9.9 ML | Refills: 0 | Status: SHIPPED | OUTPATIENT
Start: 2023-09-29

## 2023-09-28 RX ORDER — POLYETHYLENE GLYCOL 3350 17 G/17G
17 POWDER, FOR SOLUTION ORAL DAILY PRN
Qty: 507 G | Refills: 0
Start: 2023-09-28

## 2023-09-28 RX ORDER — ACETAMINOPHEN 325 MG/1
650 TABLET ORAL 3 TIMES DAILY PRN
Refills: 0
Start: 2023-09-28

## 2023-09-28 RX ORDER — ASCORBIC ACID 500 MG
500 TABLET ORAL DAILY
Qty: 30 TABLET | Refills: 0 | Status: SHIPPED | OUTPATIENT
Start: 2023-09-29

## 2023-09-28 RX ORDER — HYDRALAZINE HYDROCHLORIDE 10 MG/1
10 TABLET, FILM COATED ORAL 3 TIMES DAILY
Qty: 90 TABLET | Refills: 0 | Status: SHIPPED | OUTPATIENT
Start: 2023-09-28

## 2023-09-28 RX ORDER — FOLIC ACID 1 MG/1
1 TABLET ORAL DAILY
Qty: 30 TABLET | Refills: 0 | Status: SHIPPED | OUTPATIENT
Start: 2023-09-29

## 2023-09-28 RX ADMIN — FLUTICASONE PROPIONATE 1 SPRAY: 50 SPRAY, METERED NASAL at 08:07

## 2023-09-28 RX ADMIN — Medication 6 MG: at 22:17

## 2023-09-28 RX ADMIN — ATORVASTATIN CALCIUM 40 MG: 40 TABLET, FILM COATED ORAL at 22:23

## 2023-09-28 RX ADMIN — AMLODIPINE BESYLATE 5 MG: 5 TABLET ORAL at 08:09

## 2023-09-28 RX ADMIN — HEPARIN SODIUM 5000 UNITS: 5000 INJECTION INTRAVENOUS; SUBCUTANEOUS at 13:40

## 2023-09-28 RX ADMIN — Medication 250 MG: at 08:08

## 2023-09-28 RX ADMIN — HEPARIN SODIUM 5000 UNITS: 5000 INJECTION INTRAVENOUS; SUBCUTANEOUS at 05:40

## 2023-09-28 RX ADMIN — PANTOPRAZOLE SODIUM 40 MG: 40 TABLET, DELAYED RELEASE ORAL at 05:40

## 2023-09-28 RX ADMIN — OFLOXACIN 5 DROP: 3 SOLUTION OPHTHALMIC at 08:07

## 2023-09-28 RX ADMIN — FOLIC ACID 1 MG: 1 TABLET ORAL at 08:08

## 2023-09-28 RX ADMIN — SENNOSIDES 8.6 MG: 8.6 TABLET, FILM COATED ORAL at 17:07

## 2023-09-28 RX ADMIN — TRAZODONE HYDROCHLORIDE 25 MG: 50 TABLET ORAL at 22:18

## 2023-09-28 RX ADMIN — HYDRALAZINE HYDROCHLORIDE 10 MG: 10 TABLET, FILM COATED ORAL at 16:11

## 2023-09-28 RX ADMIN — HEPARIN SODIUM 5000 UNITS: 5000 INJECTION INTRAVENOUS; SUBCUTANEOUS at 22:19

## 2023-09-28 RX ADMIN — SODIUM BICARBONATE 650 MG TABLET 1300 MG: at 08:07

## 2023-09-28 RX ADMIN — SENNOSIDES 8.6 MG: 8.6 TABLET, FILM COATED ORAL at 08:08

## 2023-09-28 RX ADMIN — SODIUM BICARBONATE 650 MG TABLET 1300 MG: at 17:07

## 2023-09-28 RX ADMIN — LORATADINE 10 MG: 10 TABLET ORAL at 22:18

## 2023-09-28 RX ADMIN — MAGNESIUM OXIDE TAB 400 MG (241.3 MG ELEMENTAL MG) 400 MG: 400 (241.3 MG) TAB at 08:09

## 2023-09-28 RX ADMIN — CITALOPRAM HYDROBROMIDE 20 MG: 20 TABLET ORAL at 08:09

## 2023-09-28 RX ADMIN — HYDRALAZINE HYDROCHLORIDE 10 MG: 10 TABLET, FILM COATED ORAL at 08:07

## 2023-09-28 RX ADMIN — OXYCODONE HYDROCHLORIDE AND ACETAMINOPHEN 500 MG: 500 TABLET ORAL at 08:09

## 2023-09-28 RX ADMIN — Medication 250 MG: at 17:07

## 2023-09-28 RX ADMIN — FERROUS GLUCONATE 324 MG: 324 TABLET ORAL at 06:01

## 2023-09-28 NOTE — PROGRESS NOTES
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)    Severe protein-calorie malnutrition (HCC)    Metabolic acidosis    Hypomagnesemia    RBBB    Benign hypertension with chronic kidney disease, stage IV (HCC)    Persistent proteinuria    Anemia    Chronic kidney disease-mineral and bone disorder    Visual hallucinations    Functional diarrhea    Platelets decreased (HCC)    Urethral tumor    Secondary hyperparathyroidism of renal origin ([address])    Stage 4 chronic kidney disease ([address])    Anemia due to stage 4 chronic kidney disease (HCC)    Shortness of breath    Chemotherapy-induced thrombocytopenia    Rib pain on left side    Left inguinal pain    Chronic diastolic CHF (congestive heart failure) (HCC)    Pelvic lymphadenopathy    Hyponatremia    Fracture of temporal bone (HCC)    SDH (subdural hematoma) ([address])    Fall    SAH (subarachnoid hemorrhage) (HCC)    Pneumocephalus    History of stroke    Abnormal findings on imaging test    Atrial flutter (HCC)    Leukocytosis    GERD (gastroesophageal reflux disease)    Sinus congestion    Insomnia    At risk for venous thromboembolism (VTE)    Acute headache    Snoring    Skin tear of right forearm without complication       Past Medical History:   Diagnosis Date    Acute kidney injury ([address])     Anemia Jan. 2022    Arthritis     Hands    Wright esophagus     BPH with obstruction/lower urinary tract symptoms     CAD (coronary artery disease)     Last assessed 09/16/15    Cancer ([address])     bladder    Cataract, acquired     Last assessed 10/10/17    Chronic kidney disease     Coronary artery disease     Diabetes mellitus ([address])     NIDDM    Diabetic neuropathy ([address])     Feet    Enlarged prostate with lower urinary tract symptoms (LUTS)     Last assessed 10/10/17    Erectile dysfunction     GERD (gastroesophageal reflux disease)     Last assessed 10/10/17    Hemoptysis     Last assessed 03/14/16    Hypercholesterolemia     Last assessed 10/10/17    Hypertension     Last assessed 10/10/17    Kidney stone     Macular degeneration     Right eye is particularly affected-peripheral vision intact    Myocardial infarction ([address]) 1998    OAB (overactive bladder)     Testicular hypofunction     Testicular hypogonadism     Last assessed 10/10/17    Tinnitus     Umbilical hernia     Last assessed 06/18/14    Urge incontinence of urine     Wears glasses      Etiologic Diagnosis: temporal bone fracture with associated subdural hematoma and subarachnoid hemorrhage  Restrictions/Precautions  Precautions: Fall Risk, Sweet, Visual deficit (+MD, + illeal conduit to sweet bag, RUE skin tear)  Weight Bearing Restrictions: No  ROM Restrictions: No  Braces or Orthoses:  (B TEDs and ABD binder (not utilized this date as BPs have been stable))     Occupational Therapy LTG's  Eating Oral care Bathing LB dress UB dress   Eating Goal: 06. Independent - Patient completes the activity by him/herself with no assistance from a helper. Oral Hygiene Goal: 06.  Independent - Patient completes the activity by him/herself with no assistance from a helper. Shower/bathe self Goal: 04. Supervision or touching assistance- [place] provides VERBAL CUES or supervision throughout activity. Lower body dressing Goal: 04. Supervision or touching assistance- [place] provides VERBAL CUES or supervision throughout activity. Upper body dressing Goal: 06. Independent - Patient completes the activity by him/herself with no assistance from a helper. Toileting Toilet txf Func txf IADL Med    Toileting hygiene Goal: 04. Supervision or touching assistance- [place] provides VERBAL CUES or supervision throughout activity. Toilet transfer Goal: 04. Supervision or touching assistance- [place] provides VERBAL CUES or supervision throughout activity. Assist Level:  (wife manages at baseline) Assist Level: Supervision (patient manages with maginifer and headlight)   OT interventions: Treatment/Interventions: ADL retraining, Functional transfer training, Therapeutic exercise, Endurance training, Cognitive reorientation, Patient/family training, Equipment eval/education, Compensatory technique education, Continued evaluation  Discharge Plan:  OT Discharge Recommendation:  (Pending progress)   DME: Equipment Recommended: Bedside commode (Pt has RW and tub bench for D/C.),  ,   states has shower chair with no back     09/28/23 0830   Pain Assessment   Pain Assessment Tool 0-10   Pain Score No Pain   Lifestyle   Autonomy "I can walk in."   Eating   Type of Assistance Needed Independent   Eating CARE Score 6   Oral Hygiene   Type of Assistance Needed Supervision   Comment stance at sink   Oral Hygiene CARE Score 4   Shower/Bathe Self   Type of Assistance Needed Physical assistance   Physical Assistance Level 25% or less   Comment pt today req asisst for bathing b/l feet in sitting. pt reports does not desire to use LHAE at this time.    Shower/Bathe Self CARE Score 3   Upper Body Dressing   Type of Assistance Needed Set-up / clean-up   Upper Body Dressing CARE Score 5 Lower Body Dressing   Type of Assistance Needed Incidental touching   Comment increased time to thread feet, require use of lower chair. Lower Body Dressing CARE Score 4   Putting On/Taking Off Footwear   Type of Assistance Needed Set-up / clean-up   Putting On/Taking Off Footwear CARE Score 5   Roll Left and Right   Type of Assistance Needed Supervision   Roll Left and Right CARE Score 4   Sit to Lying   Type of Assistance Needed Supervision   Sit to Lying CARE Score 4   Lying to Sitting on Side of Bed   Type of Assistance Needed Supervision   Lying to Sitting on Side of Bed CARE Score 4   Sit to Stand   Type of Assistance Needed Supervision   Sit to Stand CARE Score 4   Bed-Chair Transfer   Type of Assistance Needed Supervision   Comment RW   Chair/Bed-to-Chair Transfer CARE Score 4   Toileting Hygiene   Type of Assistance Needed Supervision   Comment able to compelete walk to/from bathroom   [facility] Score 4   Toilet Transfer   Type of Assistance Needed Supervision   Comment RW   Toilet Transfer CARE Score 4   Assessment   Treatment Assessment Pt participated in skilled OT session focusing on functional ADL retraining, activity tolerance, activity modification, use of DME, and functional transfers. See above for details on ADL function. Pt continues to demonstrate decreased ability to complete dynamic reaching to lower body, most limited to enviroment set up in room. Pt reports assist at home as needed and does not with to use LHAE. CS for all tasks today, reports felling well but a little tired. Pt continues to require skilled acute rehab OT services to increase overall functional independence and safety w/ I/ADLs and functional transfers. Continue with plan for discharge as return home with increased family support,.  Continued plan of care for OT sessions to focus on the following areas:  ADL Retraining , LB Dressing, Functional Transfers, Functional Cognition, Standing tolerance, Standing balance , DME training/education, Family training/education and Energy conservation training/education.    Prognosis Good   Plan   Progress Progressing toward goals   OT Therapy Minutes   OT Time In 0830   OT Time Out 0930   OT Total Time (minutes) 60   OT Mode of treatment - Individual (minutes) 60   OT Mode of treatment - Concurrent (minutes) 0   OT Mode of treatment - Group (minutes) 0   OT Mode of treatment - Co-treat (minutes) 0   OT Mode of Treatment - Total time(minutes) 60 minutes   OT Cumulative Minutes 825

## 2023-09-28 NOTE — ASSESSMENT & PLAN NOTE
Noted to be in a-flutter in acute setting. ECHO obtained on 9/16: EF 50-55%, abnormal diastolic inflow due to atrial flutter, L atrium moderately dilated, and R atrium mildly dilated. Monitor routine VS.  Replete electrolytes as needed. Continue with magnesium oxide 400mg daily. Last EKG showed QTc of 526. Repeat EKG on 9/20 showed QTc of 499. Metoprolol discontinued. Currently not taking anticoagulation - [id number] score of 8. May benefit from anticoagulation - consider starting after follow-up with Neurosurgery. Follows with Dr. Chiquis Carvajal (Cardiology) as outpatient. Repeat EKG on 9/20 shows that patient is still in atrial flutter. Cardiology consulted. Hold AV node blockers. Consider anticoagulation once stable per Neurosurgery perspective. Follow-up with Cardiology as outpatient. No need for repeat ECHO at this time.

## 2023-09-28 NOTE — PROGRESS NOTES
09/28/23 1100   Pain Assessment   Pain Assessment Tool 0-10   Pain Score No Pain   Restrictions/Precautions   Precautions Fall Risk; Goins; Visual deficit   Cognition   Overall Cognitive Status Impaired   Arousal/Participation Lethargic   Subjective   Subjective sleeping heavily upon entry. Somewhat arousable, pt acknowledging he is not supposed to have therapy until later today "Im too tired now" - Clock in pts room incorrect, slow by 30 min. Pt asking for 30 min to rest, adivsed clock is wrong, and time is 11pm.   Roll Left and Right   Type of Assistance Needed Independent   Roll Left and Right CARE Score 6   Therapeutic Interventions   Strengthening Supine B LE TE: APs, SAQ, AA Abd and heel slides. x20 each. Assessment   Treatment Assessment Pt sleeping soundly, poorly arousable, "I never get up this early at home" Cooperative with simple bed level TE. To have FT in PM with spouse. To discuss DC plans at team post lunch. Home PT to follow. Recommendation   PT Discharge Recommendation Home with home health rehabilitation   PT Therapy Minutes   PT Time In 1100   PT Time Out 1130   PT Total Time (minutes) 30   PT Mode of treatment - Individual (minutes) 30   PT Mode of treatment - Concurrent (minutes) 0   PT Mode of treatment - Group (minutes) 0   PT Mode of treatment - Co-treat (minutes) 0   PT Mode of Treatment - Total time(minutes) 30 minutes   PT Cumulative Minutes 570   Therapy Time missed   Time missed?  No

## 2023-09-28 NOTE — PROGRESS NOTES
09/28/23 0960   Pain Assessment   Pain Assessment Tool 0-10   Pain Score No Pain   Restrictions/Precautions   Precautions Fall Risk; Goins; Visual deficit   Weight Bearing Restrictions No   ROM Restrictions No   Comprehension   Comprehension (FIM) 5 - Understands basic directions and conversation   Expression   Expression (FIM) 6 - Has only MILD difficulty with complex/abstract info   Social Interaction   Social Interaction (FIM) 6 - Interacts appropriately with others BUT requires extra  time   Problem Solving   Problem solving (FIM) 5 - Solves basic problems 90% of time   Memory   Memory (FIM) 5 - [place] cues patient   Speech/Language/Cognition Assessmetn   Treatment Assessment Pt seen for skilled speech therapy session targeting cognitive linguistic communication skills. Pt alert and interactive- fatigued though from OT ADL this AM. Pt commented "this is earlier than my usual". Pt agreeable to session tomorrow for reviewing meds. Pt commented "there are a bunch of new ones". SLP noted OT completed tangible sort last weekend and pt did well- reviewed plan for him to complete at home with supervision of wife initially. Reviewed and discussed each medication 1 by 1 with making cheatsheet given name of medication, dosage, purpose, and frequency. Pt current on 17 medications including heparin injections and a nasal spray. Pt was able to recall majority purpose of medications, needing cues for 4 and then was also able to recall some of the frequencies independently as they were familiar to him from prior to admission. Pt aware of his multiple times a day to take meds (early AM before bfast, AM, PM, Bedtime). Pt with good attention and conversation during session- left med list with him to review on his own or with wife later who is coming in for FT this afternoon. Discussed pending discharge- team meeting today to discuss and team to follow up with pt later.  Pt overall is improving with skilled SLP services and will cont to benefit to maximize overall cognitive linguistic communication abilities at this time.    SLP Therapy Minutes   SLP Time In 56   SLP Time Out 1010   SLP Total Time (minutes) 30   SLP Mode of treatment - Individual (minutes) 30   SLP Mode of treatment - Concurrent (minutes) 0   SLP Mode of treatment - Group (minutes) 0   SLP Mode of treatment - Co-treat (minutes) 0   SLP Mode of Treatment - Total time(minutes) 30 minutes   SLP Cumulative Minutes 195

## 2023-09-28 NOTE — ASSESSMENT & PLAN NOTE
Hgb currently 10.3. Had been receiving ferrous sulfate as OP. Vitamin B12 1,238 on 9/20. Iron panel completed on 9/20 and showed iron sat 14%, TIBC 215, and iron 30. Ferritin 131. Folate 8. Started on folic acid 1mg daily, ferrous gluconate 324mg daily, and vitamin C 500mg daily on 9/21. Currently asymptomatic. Continue to trend routine CBC.

## 2023-09-28 NOTE — CASE MANAGEMENT
Team update: CM met with pt and pt's wife at bedside to review recommendation of dc tomorrow 9/29 after CT scan with home therapies rn pt ot, agreeable to [facility] referral sent.

## 2023-09-28 NOTE — ASSESSMENT & PLAN NOTE
WBC count currently 12.13 from 12.97. Chronically elevated as outpatient. Currently no active s/s of infection. Received Unasyn due to open fracture. UA obtained on 9/19 - negative for infection. Continue to trend routine CBC.

## 2023-09-28 NOTE — ASSESSMENT & PLAN NOTE
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 Underwent radical cystoprostatectomy with ileal conduit at [facility]. Recurrence in 2022 and was started chemotherapy. Currently receiving Avelunab and aranesp for 3 months. Follows with Dr. Nomi Dalal (Hem/Onc) as outpatient - due for appt on 9/26. Ordered PET scan for 9/19 but currently inpatient. CT chest/abd/pelvis in acute setting showed multiple concerns for metastasis. Follow-up with Hem/Onc as outpatient.

## 2023-09-28 NOTE — ASSESSMENT & PLAN NOTE
Experienced a-flutter in acute setting. ECHO on 9/16 showed EF 50-55%, abnormal diastolic inflow due to atrial flutter, L atrium moderately dilated, and R atrium mildly dilated. Cardiology consulted and following. No need for repeat ECHO inpatient. Will obtain as OP if patient continues to be in a-flutter. Follows with Dr. Cyrus Child (Cardiology) as outpatient.

## 2023-09-28 NOTE — TELEPHONE ENCOUNTER
Ji Costello  1940 has CT scheduled for tomorrow and will be DC tomorrow per Shayy Owen   Message sent to LB

## 2023-09-28 NOTE — ASSESSMENT & PLAN NOTE
Lab Results   Component Value Date    HGBA1C 6.3 06/05/2023       Recent Labs     09/26/23  1610 09/27/23  0610 09/27/23  1608 09/28/23  0624   POCGLU 211* 132 148* 129       Blood Sugar Average: Last 72 hrs:  (P) [id number]     Currently diet controlled at home. A1c 6.3 on 6/5/2023. Mirela Busby at home for renal protection. Continue BID accuchecks and cons. carb diet.

## 2023-09-28 NOTE — ASSESSMENT & PLAN NOTE
Lab Results   Component Value Date    EGFR 24 09/28/2023    EGFR 29 09/25/2023    EGFR 29 09/20/2023    CREATININE 2.38 (H) 09/28/2023    CREATININE 2.05 (H) 09/25/2023    CREATININE 2.04 (H) 09/20/2023     Creatinine currently 2.38. Baseline creatinine 2.5-2.8. Avoid nephrotoxins as able - losartan currently on hold. Receiving torsemide 20mg EOD. Ensure adequate hydration. Currently being seen by Vascular for possible fistula creation as OP - on hold due to cancer treatment. Continue sodium bicarbonate 1300mg BID.   Follows with Nephrology as outpatient - Dr. Lula White.

## 2023-09-28 NOTE — PLAN OF CARE
Problem: SKIN/TISSUE INTEGRITY - ADULT  Goal: Skin Integrity remains intact(Skin Breakdown Prevention)  Description: Assess:  -Perform Dennis assessment every 8  -Clean and moisturize skin every 8  -Inspect skin when repositioning, toileting, and assisting with ADLS  -Assess under medical devices such as  every   -Assess extremities for adequate circulation and sensation     Bed Management:  -Have minimal linens on bed & keep smooth, unwrinkled  -Change linens as needed when moist or perspiring  -Avoid sitting or lying in one position for more than 2 hours while in bed  -Keep HOB at 30 degrees     Toileting:  -Offer bedside commode  -Assess for incontinence every 2  -Use incontinent care products after each incontinent episode such as     Activity:  -Mobilize patient 4 times a day  -Encourage activity and walks on unit  -Encourage or provide ROM exercises   -Turn and reposition patient every 2 Hours  -Use appropriate equipment to lift or move patient in bed  -Instruct/ Assist with weight shifting every 2 when out of bed in chair  -Consider limitation of chair time 4 hour intervals    Skin Care:  -Avoid use of baby powder, tape, friction and shearing, hot water or constrictive clothing  -Relieve pressure over bony prominences using pillows  -Do not massage red bony areas    Next Steps:  -Teach patient strategies to minimize risks such as friction   -Consider consults to  interdisciplinary teams such as nutrition and wound care  Outcome: Progressing     Problem: Prexisting or High Potential for Compromised Skin Integrity  Goal: Skin integrity is maintained or improved  Description: INTERVENTIONS:  - Identify patients at risk for skin breakdown  - Assess and monitor skin integrity  - Assess and monitor nutrition and hydration status  - Monitor labs   - Assess for incontinence   - Turn and reposition patient  - Assist with mobility/ambulation  - Relieve pressure over bony prominences  - Avoid friction and shearing  - Provide appropriate hygiene as needed including keeping skin clean and dry  - Evaluate need for skin moisturizer/barrier cream  - Collaborate with interdisciplinary team   - Patient/family teaching  - Consider wound care consult   Outcome: Progressing

## 2023-09-28 NOTE — PROGRESS NOTES
[address] [facility]  Progress Note  Name: Dagmar Baltazar  MRN: [id number]  Unit/Bed#: -73 I Date of Admission: 9/19/2023   Date of Service: 9/28/2023 I Hospital Day: 9    Assessment/Plan   Snoring  Assessment & Plan  Wife has noted that patient snores at HS. Obtaining overnight noc ox on 9/22 to determine if patient has sleep apnea. Study showed desat for less than 2 minutes with desat as low as 81%. No need for O2 on discharge. Acute headache  Assessment & Plan  Worsening R sided headache on 9/21 along with hypertension. 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 Trace midline shift to the left. Stable hyperdense hemorrhages seen in the right subarachnoid/subdural along the right frontal and right temporal regions. No new area of acute hemorrhage. Stable left frontal interior contusions. Stable ventricular size and stable volume of the hyperdense layering blood products in the occipital horns. Neurosurgery recommending f/u CTH in 1 week. Discuss embo at that time. Neurovascular checks Q shift. Repeat CTH with any acute changes. Insomnia  Assessment & Plan  Difficulty sleeping in the hospital setting. Currently on melatonin 6mg at HS. Started on trazodone 50mg at [facility] per Primary team on 9/25. Obtain sleep logs. Sinus congestion  Assessment & Plan  Chronic. Uses over the counter nasal sprays and antihistamines. Started on Claritin and Flonase on 9/20. Encouraged to follow-up with ENT as outpatient. GERD (gastroesophageal reflux disease)  Assessment & Plan  Continue Protonix 40mg daily as substitute for non-formulary omeprazole. Started on PRN Tums due to complaints of indigestion. Leukocytosis  Assessment & Plan  WBC count currently 12.13 from 12.97. Chronically elevated as outpatient. Currently no active s/s of infection. Received Unasyn due to open fracture.   UA obtained on 9/19 - negative for infection. Continue to trend routine CBC. Atrial flutter [facility])  Assessment & Plan  Noted to be in a-flutter in acute setting. ECHO obtained on 9/16: EF 50-55%, abnormal diastolic inflow due to atrial flutter, L atrium moderately dilated, and R atrium mildly dilated. Monitor routine VS.  Replete electrolytes as needed. Continue with magnesium oxide 400mg daily. Last EKG showed QTc of 526. Repeat EKG on 9/20 showed QTc of 499. Metoprolol discontinued. Currently not taking anticoagulation - [id number] score of 8. May benefit from anticoagulation - consider starting after follow-up with Neurosurgery. Follows with Dr. Marjan Moore (Cardiology) as outpatient. Repeat EKG on 9/20 shows that patient is still in atrial flutter. Cardiology consulted. Hold AV node blockers. Consider anticoagulation once stable per Neurosurgery perspective. Follow-up with Cardiology as outpatient. No need for repeat ECHO at this time. Abnormal findings on imaging test  Assessment & Plan  CTA chest/abd/pelvis on 9/15 with multiple concerning lymph nodes/lesions for metastasis including: nodule at the L obturator internus muscle, L sided mesenteric lymph nodes, R external iliac lymph node, R sided retroperitoneal lymph nodes, hypodense structure along with R pelvic sidewall, lesions in the R inferior hepatic lobe, peritoneal soft tissue nodularity, R cardiophrenic lymph node, 2 enlarged retrocrural lymph nodes, and R middle lobe nodule. Follow-up with Hematology/Oncology as outpatient. History of stroke  Assessment & Plan  CTH on 9/16 showed chronic appearing R caudate lacunar infarct. Currently on Lipitor 40mg daily. Would benefit from starting antiplatelet - would need to discuss with Neurosurgery at 2 week follow-up. Continue with PT/OT. Pneumocephalus  Assessment & Plan  Pneumocephalus within the sinus near the area of the fracture seen on CT.   Consider obtaining CTA/CTV if concerns for sinus thrombosis. SDH (subdural hematoma) ([facility])  Assessment & Plan  S/p fall on 9/15. [address] on 9/15 showed small foci of subdural hemorrhage in the R insular region and temporal region without significant mass effect. Repeat CTH on 9/17 stable. Changes noted on repeat CTH on 9/21. Neurosurgery follow-up this week. Currently holding AC/AP. Has been restarted on DVT ppx per Neurosurgery. Neurovascular checks Q shift. Maintain SBP <160. Completed Keppra for 7 days for seizure ppx. Follow-up with Neurosurgery in 2 weeks with repeat CTH (9/29). Primary team following. PT/OT. Fracture of temporal bone [facility])  Assessment & Plan  S/p fall on 9/15. CTH showed hematoma in the L parietal vertex and L occipital region, non-displaced fracture through the R temporal bone, linear non-displaced fracture through the posterior wall of the condyle with middle ear hemorrhage, fracture passes through the hypotympanum, non-displaced fracture through the R occipital bone. ENT consulted in acute setting - follow-up 1 week after discharge for audiogram.  No surgical intervention needed at this time. Completed Unasyn for 7 day course for open fracture on 9/22. Completed Floxin drops to R ear for 10 days total on 9/25. Consider CTA/CTV as needed if concerns for sinus thrombosis given pneumocephalus within the sinus near the area of the fracture. Had not been done inpatient due to concerns of frequent contrast administration with CKD stage 4. Anemia due to stage 4 chronic kidney disease ([facility])  Assessment & Plan  Hgb currently 10.3. Had been receiving ferrous sulfate as OP. Vitamin B12 1,238 on 9/20. Iron panel completed on 9/20 and showed iron sat 14%, TIBC 215, and iron 30. Ferritin 131. Folate 8. Started on folic acid 1mg daily, ferrous gluconate 324mg daily, and vitamin C 500mg daily on 9/21. Currently asymptomatic. Continue to trend routine CBC.     Stage 4 chronic kidney disease [facility])  Assessment & Plan  Lab Results   Component Value Date    EGFR 24 09/28/2023    EGFR 29 09/25/2023    EGFR 29 09/20/2023    CREATININE 2.38 (H) 09/28/2023    CREATININE 2.05 (H) 09/25/2023    CREATININE 2.04 (H) 09/20/2023     Creatinine currently 2.38. Baseline creatinine 2.5-2.8. Avoid nephrotoxins as able - losartan currently on hold. Receiving torsemide 20mg EOD. Ensure adequate hydration. Currently being seen by Vascular for possible fistula creation as OP - on hold due to cancer treatment. Continue sodium bicarbonate 1300mg BID. Follows with Nephrology as outpatient - Dr. Ioana Negron.    Benign hypertension with chronic kidney disease, stage IV [facility])  Assessment & Plan  Home regimen: losartan 25mg daily, metoprolol tartrate 25mg Q12, and torsemide 20mg EOD. Currently receiving amlodipine 5mg daily, torsemide 20mg EOD, and hydralazine 10mg TID. Metoprolol currently on hold due to prolonged QTc and bradycardia. Maintain SBP <160 due to recent intracranial bleed and >140 due to renal function. Continue to monitor BP with routine VS.  Follows with Dr. Ashok Hernandes (Cardiology) and Dr. Ioana Negron (Nephrology) as outpatient. Anxiety and depression  Assessment & Plan  Continue home Celexa 20mg daily. Supportive counseling as needed. Consider Neuropsych consult as needed. Ischemic cardiomyopathy  Assessment & Plan  Experienced a-flutter in acute setting. ECHO on 9/16 showed EF 50-55%, abnormal diastolic inflow due to atrial flutter, L atrium moderately dilated, and R atrium mildly dilated. Cardiology consulted and following. No need for repeat ECHO inpatient. Will obtain as OP if patient continues to be in a-flutter. Follows with Dr. Ashok Hernandes (Cardiology) as outpatient. Coronary artery disease involving native coronary artery of native heart without angina pectoris  Assessment & Plan  Hx of CABG. Continue Lipitor 40mg daily. Metoprolol on hold currently due to bradycardia and prolonged QTc.   Does not take ASA as outpatient - may benefit due to hx of CAD and possible stroke seen on CT. Recommend discussing AP/AC use at 2 week follow-up with Neurosurgery. Elevated troponins in acute setting - felt to be type II MI in setting of trauma. 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 Underwent radical cystoprostatectomy with ileal conduit at [facility]. Recurrence in 2022 and was started chemotherapy. Currently receiving Avelunab and aranesp for 3 months. Follows with Dr. Yuniel Littlejohn (Hem/Onc) as outpatient - due for appt on 9/26. Ordered PET scan for 9/19 but currently inpatient. CT chest/abd/pelvis in acute setting showed multiple concerns for metastasis. Follow-up with Hem/Onc as outpatient. Hyperlipidemia  Assessment & Plan  Continue home Lipitor 40mg daily. DMII (diabetes mellitus, type 2) [facility])  Assessment & Plan  Lab Results   Component Value Date    HGBA1C 6.3 06/05/2023       Recent Labs     09/26/23  1610 09/27/23  0610 09/27/23  1608 09/28/23  0624   POCGLU 211* 132 148* 129       Blood Sugar Average: Last 72 hrs:  (P) [id number]     Currently diet controlled at home. A1c 6.3 on 6/5/2023. Lashonda Phillips at home for renal protection. Continue BID accuchecks and cons. carb diet. * SAH (subarachnoid hemorrhage) ([address])  Assessment & Plan  S/p fall on 9/15. [address] on 9/15 showed multicompartmental hemorrhage with small foci of subarachnoid hemorrhage in the bilateral inferior frontal region and additional small foci of subarachnoid hemorrhage. Repeat CTH on 9/17 stable. Changes noted on repeat CTH on 9/21. Neurosurgery follow-up this week. Hold AC/AP. Has been restarted on DVT ppx per Neurosurgery. Neurovascular checks Q shift. Maintain SBP <160. Completed Keppra for 7 days for seizure ppx. Follow-up with Neurovascular in 2 weeks with repeat CTH (9/29). Primary team following. PT/OT. VTE Pharmacologic Prophylaxis:   Pharmacologic: Heparin  Mechanical VTE Prophylaxis in Place: Yes - sequential compression devices. Current Length of Stay: 9 day(s)    Current Patient Status: Inpatient Rehab     Discharge Plan: As per primary team.    Code Status: Level 1 - Full Code    Subjective:   Pt examined while pt sitting in bed in pt room. Currently denies any headaches. Still has occasional dizziness when first sitting at the side of the bed but states that it improves after a few moments of sitting. Still has muffled hearing and tinnitus in the R ear. Denies any SOB, palpitations, or CP. Sleeping very well at night. Currently has no new concerns or complaints at this time. Objective:     Vitals:   Temp (24hrs), Av.3 °F (36.8 °C), Min:98.2 °F (36.8 °C), Max:98.5 °F (36.9 °C)    Temp:  [98.2 °F (36.8 °C)-98.5 °F (36.9 °C)] 98.2 °F (36.8 °C)  HR:  [65-68] 66  Resp:  [18] 18  BP: (144-164)/(52-84) 144/52  SpO2:  [94 %-95 %] 94 %  Body mass index is 28.97 kg/m². Review of Systems   Constitutional: Negative for appetite change, chills, fatigue and fever. HENT: Positive for tinnitus (R ear since fall, along with muffled hearing). Negative for ear discharge, ear pain, facial swelling and trouble swallowing. Eyes: Negative for visual disturbance. Respiratory: Negative for cough, chest tightness, shortness of breath, wheezing and stridor. Cardiovascular: Negative for chest pain, palpitations and leg swelling. Gastrointestinal: Negative for abdominal distention, abdominal pain, constipation, diarrhea, nausea and vomiting. LBM    Genitourinary: Negative for difficulty urinating. Musculoskeletal: Negative for arthralgias, back pain and gait problem. Neurological: Positive for dizziness (brief dizziness while sitting at the side of the bed, improves after sitting for a few moments). Negative for weakness, light-headedness, numbness and headaches. Psychiatric/Behavioral: Negative for dysphoric mood and sleep disturbance. The patient is not nervous/anxious. All other systems reviewed and are negative. Input and Output Summary (last 24 hours): Intake/Output Summary (Last 24 hours) at 9/28/2023 7030  Last data filed at 9/28/2023 0600  Gross per 24 hour   Intake 760 ml   Output 2200 ml   Net -1440 ml       Physical Exam:     Physical Exam  Vitals and nursing note reviewed. Constitutional:       General: He is not in acute distress. Appearance: Normal appearance. He is not ill-appearing. HENT:      Head: Normocephalic and atraumatic. Cardiovascular:      Rate and Rhythm: Normal rate. Rhythm regularly irregular. Pulses: Normal pulses. Heart sounds: Normal heart sounds. No murmur heard. No friction rub. Pulmonary:      Effort: Pulmonary effort is normal. No respiratory distress. Breath sounds: Normal breath sounds. No wheezing or rhonchi. Abdominal:      General: Abdomen is flat. Bowel sounds are normal. There is no distension. Palpations: Abdomen is soft. There is no mass. Tenderness: There is no abdominal tenderness. There is no guarding or rebound. Hernia: No hernia is present. Genitourinary:     Comments: Ileal conduit, draining clear, yellow urine. Musculoskeletal:      Cervical back: Normal range of motion and neck supple. No tenderness. Right lower leg: No edema. Left lower leg: No edema. Skin:     General: Skin is warm and dry. Capillary Refill: Capillary refill takes less than 2 seconds. Neurological:      Mental Status: He is alert and oriented to person, place, and time.    Psychiatric:         Mood and Affect: Mood normal.         Behavior: Behavior normal.         Additional Data:     Labs:    Results from last 7 days   Lab Units 09/28/23  0540   WBC Thousand/uL 12.13*   HEMOGLOBIN g/dL 10.3*   HEMATOCRIT % 32.4*   PLATELETS Thousands/uL 232   NEUTROS PCT % 75   LYMPHS PCT % 9*   MONOS PCT % 11   EOS PCT % 3     Results from last 7 days   Lab Units 09/28/23  0540   SODIUM mmol/L 139   POTASSIUM mmol/L 4.0   CHLORIDE mmol/L 103   CO2 mmol/L 23   BUN mg/dL 32*   CREATININE mg/dL 2.38*   ANION GAP mmol/L 13   CALCIUM mg/dL 8.7   GLUCOSE RANDOM mg/dL 123         Results from last 7 days   Lab Units 09/28/23  0624 09/27/23  1608 09/27/23  0610 09/26/23  1610 09/26/23  0554 09/25/23  1612 09/25/23  0605 09/24/23  1623 09/24/23  0600 09/23/23  1631 09/23/23  0607 09/22/23  1626   POC GLUCOSE mg/dl 129 148* 132 211* 131 194* 139 199* 116 155* 120 125               Labs reviewed    Imaging:    Imaging reviewed    Recent Cultures (last 7 days):           Last 24 Hours Medication List:   Current Facility-Administered Medications   Medication Dose Route Frequency Provider Last Rate   • acetaminophen  650 mg Oral Q6H PRN Edu Reza MD     • amLODIPine  5 mg Oral BID LUKE Mcclure     • ascorbic acid  500 mg Oral Daily LUKE Mcclure     • atorvastatin  40 mg Oral HS Edu Reza MD     • bisacodyl  10 mg Rectal Daily PRN Edu Reza MD     • calcium carbonate  500 mg Oral Daily PRN LUKE Mcclure     • citalopram  20 mg Oral Daily Edu Reza MD     • ferrous gluconate  324 mg Oral Daily Before Breakfast LUKE Mcclure     • fluticasone  1 spray Each Nare Daily LUKE Mcclure     • folic acid  1 mg Oral Daily LUKE Mcclure     • heparin (porcine)  5,000 Units Subcutaneous [facility] Edu Rzea MD     • hydrALAZINE  10 mg Oral Q8H PRN LUKE Mcclure     • hydrALAZINE  10 mg Oral TID LUKE Mcclure     • loratadine  10 mg Oral HS LUKE Mcclure     • magnesium Oxide  400 mg Oral Daily LUKE Mcclure     • melatonin  6 mg Oral HS Edu Reza MD     • oxyCODONE  2.5 mg Oral Q8H PRN Edu Reza MD     • pantoprazole  40 mg Oral Early Morning Edu Reza MD     • polyethylene glycol  17 g Oral Daily PRTONG Mata MD     • saccharomyces boulardii  250 mg Oral BID LUKE Price     • senna  1 tablet Oral BID Laurent Mata MD     • sodium bicarbonate  1,300 mg Oral BID after meals Laurent Mata MD     • torsemide  20 mg Oral Every Other Day Laurent Mata MD     • traZODone  25 mg Oral HS Laurent Mata MD     • trimethobenzamide  200 mg Intramuscular Q6H PRN LUKE Price          M*Modal software was used to dictate this note. It may contain errors with dictating incorrect words or incorrect spelling. Please contact the provider directly with any questions.

## 2023-09-28 NOTE — ASSESSMENT & PLAN NOTE
Difficulty sleeping in the hospital setting. Currently on melatonin 6mg at . Started on trazodone 50mg at [facility] per Primary team on 9/25. Obtain sleep logs.

## 2023-09-28 NOTE — ASSESSMENT & PLAN NOTE
Home regimen: losartan 25mg daily, metoprolol tartrate 25mg Q12, and torsemide 20mg EOD. Currently receiving amlodipine 5mg daily, torsemide 20mg EOD, and hydralazine 10mg TID. Metoprolol currently on hold due to prolonged QTc and bradycardia. Maintain SBP <160 due to recent intracranial bleed and >140 due to renal function. Continue to monitor BP with routine VS.  Follows with Dr. Milton Diaz (Cardiology) and Dr. Ginger Garcia (Nephrology) as outpatient.

## 2023-09-28 NOTE — TEAM CONFERENCE
Acute RehabilitationTeam Conference Note  Date: 9/28/2023   Time: 11:20 AM       Patient Name:  Erick Kline       Medical Record Number: [id number]   YOB: 1940  Sex:  Male          Room/Bed:  [facility] 264/[facility] 264-01  Payor Info:  Payor: Ashvin Collins / Plan: MEDICARE A AND B / Product Type: Medicare A & B Fee for Service /      Admitting Diagnosis: Temporal bone fracture ([address]) [S02.19XA]   Admit Date/Time:  9/19/2023  2:37 PM  Admission Comments: No comment available     Primary Diagnosis:  SAH (subarachnoid hemorrhage) ([facility])  Principal Problem: SAH (subarachnoid hemorrhage) ([address])    Patient Active Problem List    Diagnosis Date Noted   • Skin tear of right forearm without complication 87/75/0755   • Acute headache 09/22/2023   • Snoring 09/22/2023   • Sinus congestion 09/20/2023   • Insomnia 09/20/2023   • At risk for venous thromboembolism (VTE) 09/20/2023   • History of stroke 09/19/2023   • Abnormal findings on imaging test 09/19/2023   • Atrial flutter ([address]) 09/19/2023   • Leukocytosis 09/19/2023   • GERD (gastroesophageal reflux disease) 09/19/2023   • Fracture of temporal bone ([address]) 09/15/2023   • SDH (subdural hematoma) ([address]) 09/15/2023   • Fall 09/15/2023   • SAH (subarachnoid hemorrhage) ([address]) 09/15/2023   • Pneumocephalus 09/15/2023   • Hyponatremia 09/05/2023   • Pelvic lymphadenopathy 05/16/2023   • Chronic diastolic CHF (congestive heart failure) ([address]) 05/04/2023   • Rib pain on left side 04/11/2023   • Left inguinal pain 04/11/2023   • Chemotherapy-induced thrombocytopenia 02/15/2023   • Shortness of breath 12/14/2022   • Anemia due to stage 4 chronic kidney disease ([address]) 12/07/2022   • Stage 4 chronic kidney disease ([address]) 08/10/2022   • Secondary hyperparathyroidism of renal origin ([address]) 05/19/2022   • 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   • Functional diarrhea 03/25/2022   • Platelets decreased ([address]) 03/25/2022   • Visual hallucinations 11/23/2021   • Chronic kidney disease-mineral and bone disorder 10/24/2021   • Benign hypertension with chronic kidney disease, stage IV ([address]) 07/19/2021   • Persistent proteinuria 07/19/2021   • Anemia 07/19/2021   • RBBB 05/07/2021   • Hypomagnesemia 80/21/1011   • Metabolic acidosis 14/71/7263   • Severe protein-calorie malnutrition ([address]) 02/19/2021   • Right sided Pelvic abscess in male [facility]) 02/18/2021   • Ambulatory dysfunction 02/16/2021   • Frequent falls 02/16/2021   • Anxiety and depression 12/22/2020   • Overweight 12/22/2020   • Fungal dermatitis 12/03/2020   • Forearm mass, left 09/03/2020   • Elevated troponin 07/21/2020   • Liver function study, abnormal 06/25/2020   • Malignant neoplasm of urinary bladder neck ([address]) 03/24/2020   • Positive urinary cytology 11/05/2019   • Coronary artery disease involving native coronary artery of native heart without angina pectoris 09/09/2019   • Ischemic cardiomyopathy 09/09/2019   • History of bladder cancer 07/30/2019   • Closed fracture of upper end of right fibula 03/11/2019   • Malignant neoplasm of overlapping sites of bladder ([address]) 01/10/2019   • 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/2019   • Type 2 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 ([address]) 12/05/2018   • Bladder tumor 11/01/2018   • Overactive bladder 10/10/2018   • 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   • Backache 10/21/2013   • Arteriosclerotic cardiovascular disease 10/11/2012   • DMII (diabetes mellitus, type 2) ([address]) 10/11/2012   • Hyperlipidemia 10/11/2012       Physical Therapy:    Weight Bearing Status: Full Weight Bearing  Transfers: Supervision  Bed Mobility: Supervision  Amulation Distance (ft): 500 feet  Ambulation: Supervision  Assistive Device for Ambulation: Roller Walker  Number of Stairs: 12  Assistive Device for Stairs: Bilateral [employer]  Stair Assistance: Supervision  Ramp: Supervision  Assistive Device for Ramp: Roller Walker  Discharge Recommendations: Home with:  [facility] with[de-identified] Family Support, Home Physical Therapy    Pt 80 yr old male sp fall moving item into his truck, +head strike, +temp bone fx, dx +SDH, SAH, +R ear bleeding. PMH: macular generation, CKD, CAD, BPH with h/o radical csytoprostectomy with illeal conduit (x3 yrs ago), DM, Mi. At baseline pt was generally fully I with use of SPC outdoors PRN, non  due to visual deficits. Has RW and transport chair. Lives with spouse in a multi-level condo, with main floor bed/bathroom, 1+1 no HR NANO from front, pt usually uses 3 NANO from garage L HR, R grab bar. Pt reports being forgetful and really not feeling motivated or not having much energy to do thing recently, C/o severe nausea this AM, +emesis prior to PT eval, reports having no appetite. Pt presents cooperative and somewhat engaging in PT eval, requiring min/mod A for balance due to variable posterior lean. Pt reports he generally falls backward. Extensive directional cues needed due baseline to visual deficits. Use of Rw t/o session for inc balance and safety, pt able to progress amb distance with chair pulled behind. Pt to benefit from skilled PT intervention to maxmize functional balance and safety. Pt demonstrates good rehab potential to reach S/mod I goals with LRAD pending balance and progress for safe DC home with family support with likely home PT to follow in ELOS 10-14 days. 9/27/23  Pt making good progress towards set S goals, would benefit from short FT to update spouse on pt status and mobility. Anticipate DC home in next few days pending medical stability and agreement. Pt has RW, OT to [facility].        Occupational Therapy:  Eating: Independent  Grooming: Supervision  Bathing: Incidental Touching  Bathing: Incidental Touching  Upper Body Dressing: Supervision  Lower Body Dressing: Incidental Touching  Toileting: Incidental Touching  Toilet Transfer: Incidental Touching  Cognition: Exceptions to WNL  Cognition: Decreased Memory  Orientation: Person, Place, Time, Situation  Discharge Recommendations: Home with:  [address] with[de-identified] Family Support, Home Occupational Therapy       9/27/2023  Pt continues to demonstrate improvement towards OT goals of SUP/Kristan. OT FT completed with pt's wife Merle Loaiza) on 9/26. Wife agreeable to provide SUP for ADL of bathing/toileting upon D/C. Wife can also assist with managing teds, if recommended, upon D/C. Pt's wife also completes majority of IADLs (meal prep/laundry). DME needs include a standard BSC. Pt has a tub bench and RW for D/C. Pt continues to be limited by dec standing balance, dec activity tolerance, and dec sctrength, all in which impact pt's fxnl performance with ADLs. OT plan to continue addressing above noted barriers in order to progress towards OT goals and dec caregiver burden upon D/C. Anticipated plan to schedule D/C date in upcoming days with recommendation for Home OT services. 9/21/2023  Pt presents to [facility] [place] [place] s/p fall sustaining temporal bone fx, and SDH/SAH. Pt comorbidities include HTN, DM, Acute pain, elevated troponin, Aflutter, macular degeneration At baseline pt was IND with ADL and used [facility] for community mobility. Pt has supportive wife who assisted with IADLs at baseline. Pt reports 6+ falls over past year. Patient is highly motivated and good rehab candidate. Pt has good support at baseline and receptive to interventions/suggestions.       Impairment: Decreased balance/impaired right reactions       Interventions: balance training, use of AD, fall precautions  Impairment: decreased dynamic reach                                   Interventions: introduce LHAE, flexibility exercises  Impairment: decreased activity tolerance                                Interventions: endurance training, graded task training  Impairment: impaired cognition                                                Interventions: cognitive exercises, divided attention tasks            Speech Therapy:  Mode of Communication: Verbal  Cognition: Exceptions to WNL  Cognition: Decreased Memory, Decreased Executive Functions, Decreased Attention, Decreased Comprehension  Orientation: Person, Place, Time, Situation  Discharge Recommendations: Home with:  [facility] with[de-identified] Family Support, Outpatient Speech Therapy  Pt completed the RIPA-.  Pt's overall degree of severity is WNL upon completion of the above stated subtests when compared to age matched peers 80 years and older. During informal interview prior to testing, pt oriented x4, with some recall of accident and current injuries sustained. Pt with some mild hearing issues given Right middle ear trauma from injury however did not impact for assessment purposes. Pt has visual deficits at baseline, macular degeneration. Pt does not drive, and wife completes majority of IADL tasks. Pt stated he does complete his own meds by using a magnifying glass to read the print. Recommend follow up with pt to review scores as well as further plan for discharge and discuss at team regarding SLP need to follow for any cognitive tasks required at discharge as pt appears grossly WNL at this time of testing. Update week of 2023: Pt continues to be followed for skilled SLP services focusing on cognitive linguistic skills. Pt is making steady progress towards goals at this time, however, does continue to present with deficits which act as barriers at this time in the following areas: attention, processing, STM recall, working memory, executive functions and insight. ST team has provided updates to pt's wife, as well as education regarding pt's current level of functioning, assessment results and role of SLP. Currently, pt is functioning at mod I for expression, supervision for comprehension and min assist for problem solving and memory.  At this time, pt is recommended for further skilled SLP services targeting overall and higher level cognitive linguistic skills to maximize level of functioning and independence on discharge. 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 Patient was evaluated by neurosurgery recommended nonoperative management with seizure prophylaxis and repeat CT head in 2 weeks. Patient evaluated by ENT and also recommended nonoperative management. He did note some hearing loss and recommended follow-up with them which also should include an audiogram.  He was recommended Unasyn by trauma as well as Floxin drops in his right ear. Course notable for new onset A-flutter. Full anticoagulation and antiplatelets have been on hold due to bleed. He may require Eliquis in the future. Patient evaluated by skilled therapies and noted have significant decline in ADLs and mobility and appears appropriate for admission to acute rehab at this time. Insomnia managed with melatonin 3 mg at HS. Sinus congestion managed with Claritin and Flonase. GERD managed with Protonix 40mg daily, and sodium bicarbonate 1300mg BID.  Leukocytosis managed with Unasyn due to open fracture, currently on  Keppra for 7 days for seizure precautions. Floxin drops to R ear. Benign Hypertension with chronic kidney disease, stage IV managed with metoprolol tartrate 25mg Q12, and torsemide 20mg EOD, Amlodipine 5mg BID PRN hydralazine. Anxiety and depression managed with Celexa 20mg daily. Coronary artery disease, Ischemic Cardiomyopathy, Atrial Flutter all managed with Magnesium Oxide tablet 400 mg. Hyperlipidemia managed by Lipitor 40mg daily. Subarachnoid hemorrhage managed with Keppra for 7 days for seizure precautions. Anticoagulation choice is heparin SQ. This week we will monitor vital signs and lab values. We will manage pain so pt can perform optimally at all therapy sessions. We will teach energy conservation to promote independent ADL's. We will teach the importance of turning and repositioning to off load pressure to prevent skin breakdown. We will keep pt safe from falling by hourly rounding and keeping call bell and all needs within reach. Case Management:     Discharge Planning  Living Arrangements: Lives w/ Spouse/significant other  Support Systems: Spouse/significant other, Children  Assistance Needed: TBD  Type of Current Residence: Private residence ([facility])  Current Home Care Services: No  9/21- Cm to assess. 9/28- Pt lives with spouse Stanley Phillips in ranch home, 3 NANO. Prior to admission, pt required some assistance with ADLs at times. Pt reports having cane and walker in home. Pt reports hx of STR at [facility], [address], no op therapies. PCP is Thao Kellogg, preferred pharmacy is [facility] in [facility]. Is the patient actively participating in therapies?  yes  List any modifications to the treatment plan: None    Barriers Interventions   BP Improving   Nauseous Resolved    Hard of hearinf Ear drops    BP Monitoring   Hx of bladder cancer Monitoring   Decreased balance Balance training, improving    Dynamic reach [address] training , improving   Stairs First floor set up   Impaired cog- processing, st memory, e functioning SLP Services, improving    Vision Comp strategies, baseline    LE weakness Comp strategies     Is the patient making expected progress toward goals? yes  List any update or changes to goals: None    Medical Goals: Patient will be medically stable for discharge to [facility] restrictive envrionment upon completion of rehab program and Patient will be able to manage medical conditions and comorbid conditions with medications and follow up upon completion of rehab program    Weekly Team Goals:   Rehab Team Goals  Bowel/Bladder Team Goal: Patient will return to premorbid level for bladder/bowel management upon completion of rehab program  Transfer Team Goal: Patient will be independent with transfers with least restrictive device upon completion of rehab program  Locomotion Team Goal: Patient will be independent with locomotion with least restrictive device upon completion of rehab program    Discussion: Pt participating in therapies and making progress. Superivison for all mobility, ADLs IADLs, independent with walker. Supervision for cog. Team recommending dc Friday 9/29 [facility] home pt ot    Anticipated Discharge Date:  D/c Friday 9/29 with home pt ot   [facility] Team Members Present: The following team members are supervising care for this patient and were present during this Weekly Team Conference.     Physician: Dr. Camille Guy MD  : NOELLE Polk  Registered Nurse: Shereen Holman  Physical Therapist: Kecia Carnes PT  Occupational Therapist: Kristan Harrison, [id number] [address] [facility] [address] OTR/L  Speech Therapist: Hussein Brian

## 2023-09-28 NOTE — PROGRESS NOTES
09/28/23 1430   Pain Assessment   Pain Assessment Tool 0-10   Pain Score No Pain   Restrictions/Precautions   Precautions Fall Risk; Goins; Visual deficit   Cognition   Overall Cognitive Status Impaired   Arousal/Participation Alert; Cooperative   Attention Attends with cues to redirect   Orientation Level Oriented X4   Memory Decreased short term memory   Following Commands Follows multistep commands with increased time or repetition   Subjective   Subjective "I dont want you to think Im lazy"   Sit to Lying   Type of Assistance Needed Independent   Physical Assistance Level No physical assistance   Sit to Lying CARE Score 6   Lying to Sitting on Side of Bed   Type of Assistance Needed Independent   Physical Assistance Level No physical assistance   Lying to Sitting on Side of Bed CARE Score 6   Sit to Stand   Type of Assistance Needed Supervision   Physical Assistance Level No physical assistance   Sit to Stand CARE Score 4   Bed-Chair Transfer   Type of Assistance Needed Supervision   Physical Assistance Level No physical assistance   Chair/Bed-to-Chair Transfer CARE Score 4   Car Transfer   Type of Assistance Needed Supervision   Physical Assistance Level No physical assistance   Comment simulated on Nustep. Car Transfer CARE Score 4   Walk 10 Feet   Type of Assistance Needed Supervision   Physical Assistance Level No physical assistance   Comment w/ without AD   Walk 10 Feet CARE Score 4   Walk 50 Feet with Two Turns   Type of Assistance Needed Supervision   Physical Assistance Level No physical assistance   Comment CS with/without AD   Walk 50 Feet with Two Turns CARE Score 4   Walk 150 Feet   Type of Assistance Needed Supervision   Physical Assistance Level No physical assistance   Comment CS with/without AD   Walk 150 Feet CARE Score 4   Walking 10 Feet on Uneven Surfaces   Comment (S)  please repeat prior to DC pt has S goal with RW.    Ambulation   Primary Mode of Locomotion Prior to Admission Walk Distance Walked (feet) 250 ft  (200)   Assist Device Roller Walker   Gait Pattern Slow [place]rah; Step through   Limitations Noted In Endurance;Speed   Walk Assist Level Supervision   Findings S with RW; CS without AD for balance. Does the patient walk? 2. Yes   Wheel 50 Feet with Two Turns   Reason if not Attempted Activity not applicable   Wheel 50 Feet with Two Turns CARE Score 9   Wheel 150 Feet   Reason if not Attempted Activity not applicable   Wheel 740 Feet CARE Score 9   Wheelchair mobility   Does the patient use a wheelchair? 0. No   Curb or Single Stair   Style negotiated Curb   Type of Assistance Needed Supervision   Physical Assistance Level No physical assistance   Comment CS with RW on 8in curb step   1 Step (Curb) CARE Score 4   4 Steps   Type of Assistance Needed Supervision   Physical Assistance Level No physical assistance   Comment L HR, reciprocally   4 Steps CARE Score 4   12 Steps   Type of Assistance Needed Supervision   Physical Assistance Level No physical assistance   Comment B HR   12 Steps CARE Score 4   Picking Up Object   Type of Assistance Needed Incidental touching   Physical Assistance Level No physical assistance   Comment (S)  marker from floor with RW support, unable to complete without AD. +visual deficits; at baseline would not  an item, spouse As. Picking Up Object CARE Score 4   Assessment   Treatment Assessment Pt and spouse Lelo Velázquez) present for 60 min skilled PT intervention for family training in prep for anticipated DC home tomorrow pending CT scan results. Plan currently for pt to have therapy tomorrow prior to DC. Spouse observed pts mobility discussed BPs and use of teds and binder if SBP less than 140. Spouse verbalizes understanding and reports having automatic non-wrist BP cuff. Advised to compare BPs when visiting nurse assesses BP. Education on HEP and benefits of small bouts of activity several times a day as well as hourly ambulation with RW.  Ongoing education for RW use to maximize balance and stability, can continue no AD trials with therapy only for time being. Pt and spouse verbalize understanding. Spouse observed pt ambulate, complete FF,  curb step as well as item pickup. All questions answered and no further concerns presented at thist time. Spouse seeking phone call about timing for DC if CT cleared. Plan remains for DC home with home PT recommended to follow. Pt has RW   Problem List Decreased strength;Decreased endurance;Decreased range of motion; Impaired balance;Decreased mobility; Decreased coordination;Decreased cognition; Impaired vision;Decreased skin integrity   Barriers to Discharge None   Barriers to Discharge Comments barriers resolved   Plan   Treatment/Interventions Endurance training;Patient/family training;Equipment eval/education;Gait training   Progress Improving as expected   Recommendation   PT Discharge Recommendation Home with home health rehabilitation   PT Therapy Minutes   PT Time In 1430   PT Time Out 1530   PT Total Time (minutes) 60   PT Mode of treatment - Individual (minutes) 60   PT Mode of treatment - Concurrent (minutes) 0   PT Mode of treatment - Group (minutes) 0   PT Mode of treatment - Co-treat (minutes) 0   PT Mode of Treatment - Total time(minutes) 60 minutes   PT Cumulative Minutes 630   Therapy Time missed   Time missed?  No

## 2023-09-29 ENCOUNTER — APPOINTMENT (INPATIENT)
Dept: CT IMAGING | Facility: HOSPITAL | Age: 83
DRG: 949 | End: 2023-09-29
Payer: MEDICARE

## 2023-09-29 VITALS
TEMPERATURE: 97.2 F | OXYGEN SATURATION: 96 % | WEIGHT: 184.97 LBS | RESPIRATION RATE: 18 BRPM | SYSTOLIC BLOOD PRESSURE: 144 MMHG | HEART RATE: 68 BPM | BODY MASS INDEX: 29.03 KG/M2 | DIASTOLIC BLOOD PRESSURE: 66 MMHG | HEIGHT: 67 IN

## 2023-09-29 PROBLEM — Z91.89 AT RISK FOR VENOUS THROMBOEMBOLISM (VTE): Status: RESOLVED | Noted: 2023-09-20 | Resolved: 2023-09-29

## 2023-09-29 LAB
DME PARACHUTE DELIVERY DATE REQUESTED: NORMAL
DME PARACHUTE ITEM DESCRIPTION: NORMAL
DME PARACHUTE ORDER STATUS: NORMAL
DME PARACHUTE SUPPLIER NAME: NORMAL
DME PARACHUTE SUPPLIER PHONE: NORMAL
GLUCOSE SERPL-MCNC: 110 MG/DL (ref 65–140)

## 2023-09-29 PROCEDURE — 97129 THER IVNTJ 1ST 15 MIN: CPT

## 2023-09-29 PROCEDURE — G1004 CDSM NDSC: HCPCS

## 2023-09-29 PROCEDURE — 97110 THERAPEUTIC EXERCISES: CPT

## 2023-09-29 PROCEDURE — 97116 GAIT TRAINING THERAPY: CPT

## 2023-09-29 PROCEDURE — 99239 HOSP IP/OBS DSCHRG MGMT >30: CPT

## 2023-09-29 PROCEDURE — 97130 THER IVNTJ EA ADDL 15 MIN: CPT

## 2023-09-29 PROCEDURE — 82948 REAGENT STRIP/BLOOD GLUCOSE: CPT

## 2023-09-29 PROCEDURE — 97535 SELF CARE MNGMENT TRAINING: CPT

## 2023-09-29 PROCEDURE — 99232 SBSQ HOSP IP/OBS MODERATE 35: CPT | Performed by: INTERNAL MEDICINE

## 2023-09-29 PROCEDURE — 97530 THERAPEUTIC ACTIVITIES: CPT

## 2023-09-29 PROCEDURE — 70450 CT HEAD/BRAIN W/O DYE: CPT

## 2023-09-29 RX ADMIN — MAGNESIUM OXIDE TAB 400 MG (241.3 MG ELEMENTAL MG) 400 MG: 400 (241.3 MG) TAB at 09:05

## 2023-09-29 RX ADMIN — OXYCODONE HYDROCHLORIDE AND ACETAMINOPHEN 500 MG: 500 TABLET ORAL at 09:05

## 2023-09-29 RX ADMIN — FLUTICASONE PROPIONATE 1 SPRAY: 50 SPRAY, METERED NASAL at 09:10

## 2023-09-29 RX ADMIN — CITALOPRAM HYDROBROMIDE 20 MG: 20 TABLET ORAL at 09:06

## 2023-09-29 RX ADMIN — FERROUS GLUCONATE 324 MG: 324 TABLET ORAL at 06:00

## 2023-09-29 RX ADMIN — SENNOSIDES 8.6 MG: 8.6 TABLET, FILM COATED ORAL at 09:05

## 2023-09-29 RX ADMIN — FOLIC ACID 1 MG: 1 TABLET ORAL at 09:05

## 2023-09-29 RX ADMIN — Medication 250 MG: at 09:05

## 2023-09-29 RX ADMIN — HEPARIN SODIUM 5000 UNITS: 5000 INJECTION INTRAVENOUS; SUBCUTANEOUS at 14:38

## 2023-09-29 RX ADMIN — AMLODIPINE BESYLATE 5 MG: 5 TABLET ORAL at 09:06

## 2023-09-29 RX ADMIN — SODIUM BICARBONATE 650 MG TABLET 1300 MG: at 09:06

## 2023-09-29 RX ADMIN — HEPARIN SODIUM 5000 UNITS: 5000 INJECTION INTRAVENOUS; SUBCUTANEOUS at 05:48

## 2023-09-29 RX ADMIN — TORSEMIDE 20 MG: 20 TABLET ORAL at 09:06

## 2023-09-29 RX ADMIN — PANTOPRAZOLE SODIUM 40 MG: 40 TABLET, DELAYED RELEASE ORAL at 05:48

## 2023-09-29 RX ADMIN — HYDRALAZINE HYDROCHLORIDE 10 MG: 10 TABLET, FILM COATED ORAL at 09:06

## 2023-09-29 NOTE — PROGRESS NOTES
[facility]  Progress Note  Name: Dyana Khan  MRN: [id number]  Unit/Bed#: -19 I Date of Admission: 9/19/2023   Date of Service: 9/29/2023 I Hospital Day: 10    Assessment/Plan   Snoring  Assessment & Plan  Wife has noted that patient snores at HS. Obtaining overnight noc ox on 9/22 to determine if patient has sleep apnea. Study showed desat for less than 2 minutes with desat as low as 81%. No need for O2 on discharge. Acute headache  Assessment & Plan  Worsening R sided headache on 9/21 along with hypertension. 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 Trace midline shift to the left. Stable hyperdense hemorrhages seen in the right subarachnoid/subdural along the right frontal and right temporal regions. No new area of acute hemorrhage. Stable left frontal interior contusions. Stable ventricular size and stable volume of the hyperdense layering blood products in the occipital horns. Neurosurgery recommending f/u CTH in 1 week. Discuss embo at that time. Neurovascular checks Q shift. Repeat CTH with any acute changes. Insomnia  Assessment & Plan  Difficulty sleeping in the hospital setting. Currently on melatonin 6mg at HS. Started on trazodone 50mg at [facility] per Primary team on 9/25. Obtain sleep logs. Sinus congestion  Assessment & Plan  Chronic. Uses over the counter nasal sprays and antihistamines. Started on Claritin and Flonase on 9/20. Encouraged to follow-up with ENT as outpatient. GERD (gastroesophageal reflux disease)  Assessment & Plan  Continue Protonix 40mg daily as substitute for non-formulary omeprazole. Started on PRN Tums due to complaints of indigestion. Leukocytosis  Assessment & Plan  WBC count currently 12.13 from 12.97. Chronically elevated as outpatient. Currently no active s/s of infection. Received Unasyn due to open fracture.   UA obtained on 9/19 - negative for infection. Continue to trend routine CBC. Atrial flutter [facility])  Assessment & Plan  Noted to be in a-flutter in acute setting. ECHO obtained on 9/16: EF 50-55%, abnormal diastolic inflow due to atrial flutter, L atrium moderately dilated, and R atrium mildly dilated. Monitor routine VS.  Replete electrolytes as needed. Continue with magnesium oxide 400mg daily. Last EKG showed QTc of 526. Repeat EKG on 9/20 showed QTc of 499. Metoprolol discontinued. Currently not taking anticoagulation - [id number] score of 8. May benefit from anticoagulation - consider starting after follow-up with Neurosurgery. Follows with Dr. Jesus Sarabia (Cardiology) as outpatient. Repeat EKG on 9/20 shows that patient is still in atrial flutter. Cardiology consulted. Hold AV node blockers. Consider anticoagulation once stable per Neurosurgery perspective. Follow-up with Cardiology as outpatient. No need for repeat ECHO at this time. Abnormal findings on imaging test  Assessment & Plan  CTA chest/abd/pelvis on 9/15 with multiple concerning lymph nodes/lesions for metastasis including: nodule at the L obturator internus muscle, L sided mesenteric lymph nodes, R external iliac lymph node, R sided retroperitoneal lymph nodes, hypodense structure along with R pelvic sidewall, lesions in the R inferior hepatic lobe, peritoneal soft tissue nodularity, R cardiophrenic lymph node, 2 enlarged retrocrural lymph nodes, and R middle lobe nodule. Follow-up with Hematology/Oncology as outpatient. History of stroke  Assessment & Plan  CTH on 9/16 showed chronic appearing R caudate lacunar infarct. Currently on Lipitor 40mg daily. Would benefit from starting antiplatelet - would need to discuss with Neurosurgery at 2 week follow-up. Continue with PT/OT. Pneumocephalus  Assessment & Plan  Pneumocephalus within the sinus near the area of the fracture seen on CT.   Consider obtaining CTA/CTV if concerns for sinus thrombosis. SDH (subdural hematoma) ([facility])  Assessment & Plan  S/p fall on 9/15. [address] on 9/15 showed small foci of subdural hemorrhage in the R insular region and temporal region without significant mass effect. Repeat CTH on 9/17 stable. Changes noted on repeat CTH on 9/21. Neurosurgery follow-up this week. Currently holding AC/AP. Has been restarted on DVT ppx per Neurosurgery. Neurovascular checks Q shift. Maintain SBP <160. Completed Keppra for 7 days for seizure ppx. Follow-up with Neurosurgery in 2 weeks with repeat CTH (9/29). [address] ordered for today. Scheduled for appt on 10/3. Primary team following. PT/OT. Fracture of temporal bone [facility])  Assessment & Plan  S/p fall on 9/15. CTH showed hematoma in the L parietal vertex and L occipital region, non-displaced fracture through the R temporal bone, linear non-displaced fracture through the posterior wall of the condyle with middle ear hemorrhage, fracture passes through the hypotympanum, non-displaced fracture through the R occipital bone. ENT consulted in acute setting - follow-up 1 week after discharge for audiogram.  No surgical intervention needed at this time. Completed Unasyn for 7 day course for open fracture on 9/22. Completed Floxin drops to R ear for 10 days total on 9/25. Consider CTA/CTV as needed if concerns for sinus thrombosis given pneumocephalus within the sinus near the area of the fracture. Had not been done inpatient due to concerns of frequent contrast administration with CKD stage 4. Anemia due to stage 4 chronic kidney disease (HCC)  Assessment & Plan  Hgb currently 10.3. Had been receiving ferrous sulfate as OP. Vitamin B12 1,238 on 9/20. Iron panel completed on 9/20 and showed iron sat 14%, TIBC 215, and iron 30. Ferritin 131. Folate 8. Started on folic acid 1mg daily, ferrous gluconate 324mg daily, and vitamin C 500mg daily on 9/21. Currently asymptomatic.   Continue to trend routine CBC. Stage 4 chronic kidney disease [facility])  Assessment & Plan  Lab Results   Component Value Date    EGFR 24 09/28/2023    EGFR 29 09/25/2023    EGFR 29 09/20/2023    CREATININE 2.38 (H) 09/28/2023    CREATININE 2.05 (H) 09/25/2023    CREATININE 2.04 (H) 09/20/2023     Creatinine currently 2.38. Baseline creatinine 2.5-2.8. Avoid nephrotoxins as able - losartan currently on hold. Receiving torsemide 20mg EOD. Ensure adequate hydration. Currently being seen by Vascular for possible fistula creation as OP - on hold due to cancer treatment. Continue sodium bicarbonate 1300mg BID. Follows with Nephrology as outpatient - Dr. Esme Hernandez.    Benign hypertension with chronic kidney disease, stage IV [facility])  Assessment & Plan  Home regimen: losartan 25mg daily, metoprolol tartrate 25mg Q12, and torsemide 20mg EOD. Currently receiving amlodipine 5mg daily, torsemide 20mg EOD, and hydralazine 10mg TID. Metoprolol currently on hold due to prolonged QTc and bradycardia. Maintain SBP <160 due to recent intracranial bleed and >140 due to renal function. Continue to monitor BP with routine VS.  Follows with Dr. Afshan Sesay (Cardiology) and Dr. Esme Hernandez (Nephrology) as outpatient. Anxiety and depression  Assessment & Plan  Continue home Celexa 20mg daily. Supportive counseling as needed. Consider Neuropsych consult as needed. Ischemic cardiomyopathy  Assessment & Plan  Experienced a-flutter in acute setting. ECHO on 9/16 showed EF 50-55%, abnormal diastolic inflow due to atrial flutter, L atrium moderately dilated, and R atrium mildly dilated. Cardiology consulted and following. No need for repeat ECHO inpatient. Will obtain as OP if patient continues to be in a-flutter. Follows with Dr. Afshan Sesay (Cardiology) as outpatient. Coronary artery disease involving native coronary artery of native heart without angina pectoris  Assessment & Plan  Hx of CABG. Continue Lipitor 40mg daily.   Metoprolol on hold currently due to bradycardia and prolonged QTc. Does not take ASA as outpatient - may benefit due to hx of CAD and possible stroke seen on CT. Recommend discussing AP/AC use at 2 week follow-up with Neurosurgery. Elevated troponins in acute setting - felt to be type II MI in setting of trauma. 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 Underwent radical cystoprostatectomy with ileal conduit at [facility]. Recurrence in 2022 and was started chemotherapy. Currently receiving Avelunab and aranesp for 3 months. Follows with Dr. Alida Hernandez (Hem/Onc) as outpatient - due for appt on 9/26, will need to reschedule. Ordered PET scan for 9/19 but currently inpatient. CT chest/abd/pelvis in acute setting showed multiple concerns for metastasis. Follow-up with Hem/Onc as outpatient. Hyperlipidemia  Assessment & Plan  Continue home Lipitor 40mg daily. DMII (diabetes mellitus, type 2) [facility])  Assessment & Plan  Lab Results   Component Value Date    HGBA1C 6.3 06/05/2023       Recent Labs     09/27/23  1608 09/28/23  0624 09/28/23  1612 09/29/23  0548   POCGLU 148* 129 156* 110       Blood Sugar Average: Last 72 hrs:  (P) [id number]     Currently diet controlled at home. A1c 6.3 on 6/5/2023. Zora Fill at home for renal protection. May resume on discharge. Continue BID accuchecks and cons. carb diet. * SAH (subarachnoid hemorrhage) ([address])  Assessment & Plan  S/p fall on 9/15. [address] on 9/15 showed multicompartmental hemorrhage with small foci of subarachnoid hemorrhage in the bilateral inferior frontal region and additional small foci of subarachnoid hemorrhage. Repeat CTH on 9/17 stable. Changes noted on repeat CTH on 9/21. Neurosurgery follow-up this week. Hold AC/AP. Has been restarted on DVT ppx per Neurosurgery. Neurovascular checks Q shift. Maintain SBP <160.   Completed Keppra for 7 days for seizure ppx.    Follow-up with Neurovascular in 2 weeks with repeat CTH (). CTH obtained for today. Scheduled for appt on 10/3. Primary team following. PT/OT. VTE Pharmacologic Prophylaxis:   Pharmacologic: Heparin  Mechanical VTE Prophylaxis in Place: Yes - sequential compression devices. Current Length of Stay: 10 day(s)    Current Patient Status: Inpatient Rehab     Discharge Plan: As per primary team.    Code Status: Level 1 - Full Code    Subjective:   Pt examined while pt lying in bed in pt room. Complaints of dizziness when he sits at the side of the bed which is brief and unchanged. Denies any headaches and feels that his tinnitus has improved today. Hearing is still muffled in the R ear. Plans for discharge later today. Discussed importance of monitoring BP at home and pt agreed that he will check his BP daily and record this to bring to his appointments. Discussed applying abdominal binder/TEDs when BP <140 and to call PCP if BP >160 consistently. Currently has no other concerns or questions. Objective:     Vitals:   Temp (24hrs), Av.7 °F (36.5 °C), Min:97.2 °F (36.2 °C), Max:98.4 °F (36.9 °C)    Temp:  [97.2 °F (36.2 °C)-98.4 °F (36.9 °C)] 97.2 °F (36.2 °C)  HR:  [64-68] 68  Resp:  [18] 18  BP: (118-144)/(56-68) 144/66  SpO2:  [94 %-96 %] 96 %  Body mass index is 28.97 kg/m². Review of Systems   Constitutional: Negative for appetite change, chills, fatigue and fever. HENT: Positive for tinnitus (feels that this is improving, muffled hearing to the R ear unchanged). Negative for trouble swallowing. Eyes: Negative for visual disturbance. Respiratory: Negative for cough, chest tightness, shortness of breath, wheezing and stridor. Cardiovascular: Negative for chest pain, palpitations and leg swelling. Gastrointestinal: Negative for abdominal distention, abdominal pain, constipation, diarrhea, nausea and vomiting.         LBM    Genitourinary: Negative for difficulty urinating. Musculoskeletal: Negative for arthralgias, back pain and gait problem. Neurological: Positive for dizziness (brief moment of dizziness while sitting at the side of the bed, chronic, per patient this was going on prior to his fall). Negative for weakness, light-headedness, numbness and headaches. Psychiatric/Behavioral: Negative for dysphoric mood and sleep disturbance. The patient is not nervous/anxious. All other systems reviewed and are negative. Input and Output Summary (last 24 hours): Intake/Output Summary (Last 24 hours) at 9/29/2023 0936  Last data filed at 9/29/2023 0900  Gross per 24 hour   Intake 1305 ml   Output 1025 ml   Net 280 ml       Physical Exam:     Physical Exam  Vitals and nursing note reviewed. Constitutional:       General: He is not in acute distress. Appearance: Normal appearance. He is not ill-appearing. HENT:      Head: Normocephalic and atraumatic. Cardiovascular:      Rate and Rhythm: Normal rate and regular rhythm. Pulses: Normal pulses. Heart sounds: Normal heart sounds. No murmur heard. No friction rub. Pulmonary:      Effort: Pulmonary effort is normal. No respiratory distress. Breath sounds: Normal breath sounds. No wheezing or rhonchi. Abdominal:      General: Abdomen is flat. Bowel sounds are normal. There is no distension. Palpations: Abdomen is soft. There is no mass. Tenderness: There is no abdominal tenderness. There is no guarding or rebound. Hernia: No hernia is present. Genitourinary:     Comments: Ileal conduit, draining clear, yellow urine. Musculoskeletal:      Cervical back: Normal range of motion and neck supple. No tenderness. Right lower leg: No edema. Left lower leg: No edema. Skin:     General: Skin is warm and dry. Capillary Refill: Capillary refill takes less than 2 seconds.    Neurological:      Mental Status: He is alert and oriented to person, place, and time.    Psychiatric:         Mood and Affect: Mood normal.         Behavior: Behavior normal.         Additional Data:     Labs:    Results from last 7 days   Lab Units 09/28/23  0540   WBC Thousand/uL 12.13*   HEMOGLOBIN g/dL 10.3*   HEMATOCRIT % 32.4*   PLATELETS Thousands/uL 232   NEUTROS PCT % 75   LYMPHS PCT % 9*   MONOS PCT % 11   EOS PCT % 3     Results from last 7 days   Lab Units 09/28/23  0540   SODIUM mmol/L 139   POTASSIUM mmol/L 4.0   CHLORIDE mmol/L 103   CO2 mmol/L 23   BUN mg/dL 32*   CREATININE mg/dL 2.38*   ANION GAP mmol/L 13   CALCIUM mg/dL 8.7   GLUCOSE RANDOM mg/dL 123         Results from last 7 days   Lab Units 09/29/23  0548 09/28/23  1612 09/28/23  0624 09/27/23  1608 09/27/23  0610 09/26/23  1610 09/26/23  0554 09/25/23  1612 09/25/23  0605 09/24/23  1623 09/24/23  0600 09/23/23  1631   POC GLUCOSE mg/dl 110 156* 129 148* 132 211* 131 194* 139 199* 116 155*               Labs reviewed    Imaging:    Imaging reviewed    Recent Cultures (last 7 days):           Last 24 Hours Medication List:   Current Facility-Administered Medications   Medication Dose Route Frequency Provider Last Rate   • acetaminophen  650 mg Oral Q6H PRN Jo Salgado MD     • amLODIPine  5 mg Oral BID LUKE Mills     • ascorbic acid  500 mg Oral Daily LUKE Mills     • atorvastatin  40 mg Oral HS Jo aSlgado MD     • bisacodyl  10 mg Rectal Daily PRN Jo Salgado MD     • calcium carbonate  500 mg Oral Daily PRN LUKE Mills     • citalopram  20 mg Oral Daily Jo Salgado MD     • ferrous gluconate  324 mg Oral Daily Before [address]     • fluticasone  1 spray Each Nare Daily LUKE Mills     • folic acid  1 mg Oral Daily LUKE Mills     • heparin (porcine)  5,000 Units Subcutaneous [facility] Jo Salgado MD     • hydrALAZINE  10 mg Oral Q8H PRN LUKE Mills     • hydrALAZINE  10 mg Oral TID LUKE Mills     • loratadine 10 mg Oral HS LUKE Garcia     • magnesium Oxide  400 mg Oral Daily LUKE Garcia     • melatonin  6 mg Oral HS Bonny Haywood MD     • oxyCODONE  2.5 mg Oral Q8H PRN Bonny Haywood MD     • pantoprazole  40 mg Oral Early Morning Bonny Haywood MD     • polyethylene glycol  17 g Oral Daily PRN Bonny Haywood MD     • saccharomyces boulardii  250 mg Oral BID LUKE Garcia     • senna  1 tablet Oral BID Bonny Haywood MD     • sodium bicarbonate  1,300 mg Oral BID after meals Bonny Haywood MD     • torsemide  20 mg Oral Every Other Day Bonny Haywood MD     • traZODone  25 mg Oral HS Bonny Haywood MD     • trimethobenzamide  200 mg Intramuscular Q6H PRN LUKE Garcia          M*[employer]too software was used to dictate this note. It may contain errors with dictating incorrect words or incorrect spelling. Please contact the provider directly with any questions.

## 2023-09-29 NOTE — PLAN OF CARE
Problem: SAFETY ADULT  Goal: Patient will remain free of falls  Description: INTERVENTIONS:  - Educate patient/family on patient safety including physical limitations  - Instruct patient to call for assistance with activity   - Consult OT/PT to assist with strengthening/mobility   - Keep Call bell within reach  - Keep bed low and locked with side rails adjusted as appropriate  - Keep care items and personal belongings within reach  - Initiate and maintain comfort rounds  - Make Fall Risk Sign visible to staff  - Offer Toileting every4 Hours, in advance of need  -   - Obtain necessary fall risk management equipment: rw  - Apply yellow socks and bracelet for high fall risk patients  - Consider moving patient to room near nurses station  9/29/2023 1608 by Jesús Alfaro, RN  Outcome: Adequate for Discharge  9/29/2023 1031 by Jesús Alfaro, RN  Outcome: Progressing

## 2023-09-29 NOTE — ASSESSMENT & PLAN NOTE
S/p fall on 9/15. [address] on 9/15 showed small foci of subdural hemorrhage in the R insular region and temporal region without significant mass effect. Repeat CTH on 9/17 stable. Changes noted on repeat CTH on 9/21. Neurosurgery follow-up this week. Currently holding AC/AP. Has been restarted on DVT ppx per Neurosurgery. Neurovascular checks Q shift. Maintain SBP <160. Completed Keppra for 7 days for seizure ppx. Follow-up with Neurosurgery in 2 weeks with repeat CTH (9/29). [address] ordered for today. Scheduled for appt on 10/3. Primary team following. PT/OT.

## 2023-09-29 NOTE — ASSESSMENT & PLAN NOTE
Per IM  "Wife has noted that patient snores at HS. Obtaining overnight noc ox on 9/22 to determine if patient has sleep apnea. Study showed desat for less than 2 minutes with desat as low as 81%.   No need for O2 on discharge."

## 2023-09-29 NOTE — ASSESSMENT & PLAN NOTE
Noted to be in a-flutter in acute setting. ECHO obtained on 9/16: EF 50-55%, abnormal diastolic inflow due to atrial flutter, L atrium moderately dilated, and R atrium mildly dilated. Monitor routine VS.  Replete electrolytes as needed. Continue with magnesium oxide 400mg daily. Last EKG showed QTc of 526. Repeat EKG on 9/20 showed QTc of 499. Metoprolol discontinued. Currently not taking anticoagulation - [id number] score of 8. May benefit from anticoagulation - consider starting after follow-up with Neurosurgery. Follows with Dr. Matt Dong (Cardiology) as outpatient. Repeat EKG on 9/20 shows that patient is still in atrial flutter. Cardiology consulted. Hold AV node blockers. Consider anticoagulation once stable per Neurosurgery perspective. Follow-up with Cardiology as outpatient. No need for repeat ECHO at this time.

## 2023-09-29 NOTE — PROGRESS NOTES
09/29/23 1100   Pain Assessment   Pain Assessment Tool 0-10   Pain Score No Pain   Restrictions/Precautions   Precautions Fall Risk; Goins; Visual deficit   Roll Left and Right   Type of Assistance Needed Independent   Roll Left and Right CARE Score 6   Sit to Lying   Type of Assistance Needed Independent   Sit to Lying CARE Score 6   Lying to Sitting on Side of Bed   Type of Assistance Needed Independent   Lying to Sitting on Side of Bed CARE Score 6   Sit to Stand   Type of Assistance Needed Supervision   Sit to Stand CARE Score 4   Bed-Chair Transfer   Type of Assistance Needed Supervision   Comment RW   Chair/Bed-to-Chair Transfer CARE Score 4   Walk 10 Feet   Type of Assistance Needed Supervision   Comment RW   Walk 10 Feet CARE Score 4   Walk 50 Feet with Two Turns   Type of Assistance Needed Supervision   Comment RW   Walk 50 Feet with Two Turns CARE Score 4   Walk 150 Feet   Type of Assistance Needed Supervision   Comment RW   Walk 150 Feet CARE Score 4   Walking 10 Feet on Uneven Surfaces   Type of Assistance Needed Supervision   Comment RW- indoor ramp   Walking 10 Feet on Uneven Surfaces CARE Score 4   Ambulation   Primary Mode of Locomotion Prior to Admission Walk   Distance Walked (feet) 300 ft   Assist Device Roller Walker   Walk Assist Level Supervision   Findings S for general safety will be provided from wife   Does the patient walk? 2.  Yes   Wheel 50 Feet with Two Turns   Reason if not Attempted Activity not applicable   Wheel 50 Feet with Two Turns CARE Score 9   Wheel 150 Feet   Reason if not Attempted Activity not applicable   Wheel 734 Feet CARE Score 9   Therapeutic Interventions   Strengthening Pt performed HEP and was provided with handout- Seated LAQ, Marching, Hip add isometric,  Standing Hip flex march, hip abd, hip ext, heel raises , knee flex,   Supine bridges, and sidelying clamshells   Balance amb short distance around gym no device   Equipment Use   NuStep 10 mins for neuro prime Other Comments   Comments /72   Assessment   Treatment Assessment 90 min skilled PT session focused on re education about having pt use walker at home,  Provided and pt performed HEP as noted above. Pt did not have any questions or concerns. Pt went for head scan and will be D/C pending results. Barriers to Discharge None   Recommendation   PT Discharge Recommendation Home with home health rehabilitation   PT Therapy Minutes   PT Time In 0930   PT Time Out 1100   PT Total Time (minutes) 90   PT Mode of treatment - Individual (minutes) 90   PT Mode of treatment - Concurrent (minutes) 0   PT Mode of treatment - Group (minutes) 0   PT Mode of treatment - Co-treat (minutes) 0   PT Mode of Treatment - Total time(minutes) 90 minutes   PT Cumulative Minutes 720   Therapy Time missed   Time missed?  No

## 2023-09-29 NOTE — PROGRESS NOTES
Physical Medicine and Rehabilitation Progress Note  May Catherine 80 y.o. male MRN: [id number]  Unit/Bed#: [facility] 818-02 Encounter: [id number]      Assessment & Plan:     Decline in ADLs and mobility: Functional assessment - improving         FIM  Care Score  Admit Score Recent Score    Total assist  1-100% or 2p    Tot     Max assist 2-51-75%    Sub LBD    Mod assist 3- 26-50%  Par     Min assist 3- 25% or < Par To hygiene, bathing    CG assist 4  TA  To hygiene, LBD, bathing   Sup/Setup 4-5 Sup UBD UBD   Mod-I/Indep 6 MI      Transfers  Min-mod assist  CG assist     Ambulation   150 ft mod assist  150 ft min assist     Stairs   4 steps min assist  12 steps CGA     Goal: Supervision for most ADLs and for mobility  Major barriers:  Impaired cog, balance, risk of complications   Dispo: Home Friday with  PT, OT, ST, RN      SDH (subdural hematoma) ([address])  Assessment & Plan  9/25 - appears to be better today; but still difficulty sleeping - neuro exam stable; will trial adding trazodone 25mg HS   9/22 - clinically better appearing and functioning; BP stabilizing some    9/21 - BP elevated significantly at times, some increased fatigue, headache   - 9/21 - CTH - Interval increasing CSF density right subdural hygroma with increasing mass effect on the sulci. Trace midline shift to the left. Stable hyperdense hemorrhages seen in the right subarachnoid/subdural along the right frontal and right temporal regions. No new area of acute hemorrhage demonstrated. stable left frontal inferior contusions.  Stable ventricular size and stable volume of the hyperdense layering blood products in the occipital horns.    - Discussed with NSx with tele-c/s - stable to remain in ARC setting with plan to repeat CTH in 1 week 9/29 and at that time discuss embolization   -  monitor neuro exam closely and if decline repeat CTH in interim   - Optimal BP mgmt per IM  - Fall while carrying heavy object into truck causing head trauma and multicompartmental hemorrhage with small foci of subarachnoid hemorrhage in the bilateral inferior frontal region and additional small foci of subarachnoid hemorrhage as well as R significant temporal bone fracture and small scalp hematoma in right occipito-temporal region.  - Per NSx - CTA/CTV on hold due to low GFR and CKD 4. Consider in the outpatient setting if there is concern for sinus thrombosis given pneumocephalus within the sinus near the area of fracture. - Residual impairments: Imbalance, incoordination, cog deficits (assess for other impairments during ARC course)   - Cleared for heparin 5000 U SQ TID for VTE prophylaxis   - Repeat Head CT Fri 9/29 and follow-up with NSx after  - Keppra seizure prophylaxis thru 9/21 completed     - Current/recent mood/affect/behavior/cog - less flattened, more interactive   - Remains at risk for increased confusion/delirium, restlessness, agitation, and fall - continue to monitor for concerns/changes  - Current psychotropics and potentially sedating medications:   • Citalopram  • (Keppra stopped 9/21)   • Melatonin 6mg HS  • Trazodone 25mg HS starting 9/25 HS  • PRN Oxy 2.5mg > not needing   - Monitor for need for 1:1 (Notify MD of potentially dangerous behavior such as significant impulsivity and trying to stand/get out of bed/chair unattended that could lead to fall/injury); High fall precautions with frequent rounding, patient close to nursing station if possible, frequent toileting/frequent offering urinal/bedpan (only if too immobile for safe toileting);  bed/chair alarm on at all times (high setting if needed); fall lj on side of bed (at discretion of nursing/therapy); do not leave unattended in bathroom, patient education; call bell availability; Sleep/agitation log, frequent redirection, reorientation, reassurance;  Family access to patient if helpful  - No recent reports of dangerous/risky behavior requiring 1:1 at this point but monitor closely  - Continue acute comprehensive interdisciplinary inpatient rehabilitation to include intensive skilled therapies (PT, OT, ST) with oversight and management by rehabilitation physician. Inpatient rehab level nursing, case management and weekly interdisciplinary team meetings. Provide patient and (if available) caregiver/family teaching regarding brain injury/residual disability management. - For routine restlessness, anxiety, irritability focus on non-pharmacologic management    - Obtain MOCA when cog status stabilized   - Please assess iADLs - ability to manage medications, meal prep, finances, obtaining appropriate transport from community, managing emergencies, and overall safety in home environment. - Provide therapy, compensatory strategies, and if available and necessary provide caregiver training. Notify MD of impairments or inability to perform iADLs adequately. - Monitor neuro-exam, wakefulness, mood, cognition, insight into deficits and safety awareness   - Monitor and ensure optimal management electrolytes, nutrition, and hydration  - Monitor for signs or symptoms of infection, medication intolerances, other systemic etiologies  - Additional labs, imaging, specialist follow-up as needed   - Patient/family/caregiver education and training   - Overstimulation precautions, frequent re-orientation, re-direction, re-assurance  - Optimal mood, pain, and sleep management  - Sleep log and agitation monitoring    - Limit sedating medications when possible  - Follow-up with NeuroSx after discharge and if needed during ARC course as well as PCP      * SAH (subarachnoid hemorrhage) ([address])  Assessment & Plan  See SDH     Fracture of temporal bone ([address])  Assessment & Plan  - Acute comminuted otic sparing right temporal bone fracture involving the squamosal, mastoid, and tympanic portions with diastases of occipitotemporal suture and extension of fracture into visualized right occipital calvarium.  Recommend follow-up CTV   head with contrast to assess underlying patency of right dural venous sinuses. - Probable small-to-moderate RIGHT-sided bloody mastoid effusion, large bloody middle ear effusion, and probable debris and blood products throughout external auditory canal.  - Small scalp hematoma in right occipito-temporal region.     LEFT TEMPORAL BONE CT  -Trace left mastoid effusion. Otherwise, normal LEFT temporal bone CT.     Per ENT  "right otic sparing temporal bone fracture   -Facial nerve intact, no need for surgical decompression  -Hearing loss in right ear- tuning fork exam consistent with conductive pattern secondary to debris in canal and middle ear-Recommend outpatient follow-up with audiogram next week or the following week after discharge  -Floxin drops twice daily 5 drops to R ear for 10 days due to debris in canal  -Please do not hesitate to reach out with any questions or concerns"  - OK to use Floxin drops in ear - will need full 10 day course starting now (was getting in eye) thru 9/28  - Unasyn for 1 week course completed 9/22  - Did not undergo CTV/CTA due to concern for kidney function and now NSx not currently recommending it         History of bladder cancer  Assessment & Plan  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  - Monitor site and for s/s of infection  - Receiving Avelunab every 14 days and Aranesp - on hold with acute medical decline   - Imaging of C/A/P concerning for mets - per discharging provider "soft tissue mass, hepatic lesions, peritoneal stippling consistent with metastatic bladder cancer new from last CAT scan in August 2023"  - Follow-up with H-O - patient expected to be functionally ready for d/c Friday after repeat [facility] and can follow-up with H-O following week       Insomnia  Assessment & Plan  Sub-optimal control   Melatonin 6mg HS  Trial trazodone 25mg HS  Sleep log     Abnormal findings on imaging test  Assessment & Plan  CTA chest/abd/pelvis on 9/15 with multiple concerning lymph nodes/lesions for metastasis including: nodule at the L obturator internus muscle, L sided mesenteric lymph nodes, R external iliac lymph node, R sided retroperitoneal lymph nodes, hypodense structure along with R pelvic sidewall, lesions in the R inferior hepatic lobe, peritoneal soft tissue nodularity, R cardiophrenic lymph node, 2 enlarged retrocrural lymph nodes, and R middle lobe nodule. Follow-up with Hematology/Oncology after d/c planned for late this week     Atrial flutter ([address])  Assessment & Plan  IM consulted and with overall management at their discretion during ARC course who has now c/s'd Cards  holding MTP to avoid AV josh blocker to avoid harmony  Rate control:  None  Antithrombotic: None - consider NOAC when cleared by NSx      Benign hypertension with chronic kidney disease, stage IV [facility])  Assessment & Plan  Lab Results   Component Value Date    EGFR 29 09/20/2023    EGFR 25 09/19/2023    EGFR 26 09/18/2023    CREATININE 2.04 (H) 09/20/2023    CREATININE 2.29 (H) 09/19/2023    CREATININE 2.23 (H) 09/18/2023   Improved BP control  Cards and Internal medicine consulted and co-management with their service  Monitor vitals with and without activity; monitor for orthostasis  Monitor hemoglobin, electrolytes, kidney function, hydration status   Per IM  "Home regimen: losartan 25mg daily, metoprolol tartrate 25mg Q12, and torsemide 20mg EOD. Currently receiving amlodipine 5mg daily, torsemide 20mg EOD, and hydralazine 10mg TID. Metoprolol currently on hold due to prolonged QTc and bradycardia."    Snoring  Assessment & Plan  Per IM  "Wife has noted that patient snores at HS. Obtaining overnight noc ox on 9/22 to determine if patient has sleep apnea. Study showed desat for less than 2 minutes with desat as low as 81%.   No need for O2 on discharge."    At risk for venous thromboembolism (VTE)  Assessment & Plan  SCDs, ambulation, and heparin       GERD (gastroesophageal reflux disease)  Assessment & Plan  PPI    History of stroke  Assessment & Plan  Per IM  "[address] on 9/16 showed chronic appearing R caudate lacunar infarct. Currently on Lipitor 40mg daily. Would benefit from starting antiplatelet - would need to discuss with Neurosurgery after follow-up in 2 weeks. Continue with PT/OT."    Anemia due to stage 4 chronic kidney disease ([address])  Assessment & Plan  Hb stable 9/25  Consult(ed) IM and comanagement with their service during ARC course   Monitor H/H, vitals, signs/symptoms of acute bleeding      Stage 4 chronic kidney disease [facility])  Assessment & Plan  Lab Results   Component Value Date    EGFR 29 09/20/2023    EGFR 25 09/19/2023    EGFR 26 09/18/2023    CREATININE 2.29 (H) 09/19/2023    CREATININE 2.23 (H) 09/18/2023   Cr stable at 2.05 on 9/25   Per IM Baseline creatinine 2.5-2.8. Monitor BUN/Cr intermittently as well as electrolytes   IM consulted to manage  Limit nephrotoxic agents when possible  Optimal BP mgmt   Per IM  "Currently being seen by Vascular for possible fistula creation as OP - on hold due to cancer treatment. Continue sodium bicarbonate 1300mg BID.   Follows with Nephrology as outpatient - Dr. Manuel Black."      Anxiety and depression  Assessment & Plan  Celexa  Supportive counseling  Psychology consult while in [facility] [address] on and continue to encourage deep breathing/relaxation/behavioral management techniques      Ischemic cardiomyopathy  Assessment & Plan  IM consulted and with overall management at their discretion during ARC course (EF50-55%)  OP Cards     Coronary artery disease involving native coronary artery of native heart without angina pectoris  Assessment & Plan  IM consulted and with overall management at their discretion during ARC course  Antithrombotic: Currently none (was not on in OP setting) - consider after clearance with NSx   Statin  Optimal blood pressure and blood sugar control      Hyperlipidemia  Assessment & Plan  Statin     DMII (diabetes mellitus, type 2) [facility])  Assessment & Plan  Lab Results   Component Value Date    HGBA1C 6.3 06/05/2023       Recent Labs     09/20/23  2042 09/21/23  0654 09/21/23  1634 09/22/23  0626   POCGLU 146* 130 118 115       Blood Sugar Average: Last 72 hrs:  (P) [id number]     Internal medicine consulted and management at their discretion  Monitor for signs and symptoms of hypoglycemia   Current meds: None   consistent carb diabetic diet        Other Medical Issues:  • Monitor for     Follow-up providers and other issues to be followed up after discharge  PCP  Neurosurgery  ENT  Oncology/urology  Cardiology  Chronic providers    CODE: Level 1: Full Code    Restrictions include: Fall precautions    Objective:     Allergies per EMR  Diagnostic Studies: Reviewed, no new imaging     See above as well    Laboratory: Labs reviewed  Results from last 7 days   Lab Units 09/25/23  0523   HEMOGLOBIN g/dL 10.0*   HEMATOCRIT % 31.7*   WBC Thousand/uL 12.97*     Results from last 7 days   Lab Units 09/25/23  0523   BUN mg/dL 27*   SODIUM mmol/L 135   POTASSIUM mmol/L 3.6   CHLORIDE mmol/L 102   CREATININE mg/dL 2.05*            Drug regimen reviewed, all potential adverse effects identified and addressed:    Current Facility-Administered Medications   Medication Dose Route Frequency Provider Last Rate   • acetaminophen  650 mg Oral Q6H PRN Ophelia Diaz MD     • amLODIPine  5 mg Oral BID Nadja Hal, CRNP     • ascorbic acid  500 mg Oral Daily Nadja Hal, CRNP     • atorvastatin  40 mg Oral HS Ophelia Diaz MD     • bisacodyl  10 mg Rectal Daily PRN Ophelia Diaz MD     • calcium carbonate  500 mg Oral Daily PRN Nadja Hal, CRNP     • citalopram  20 mg Oral Daily Ophelia Diaz MD     • ferrous gluconate  324 mg Oral Daily Before [address], CRNP     • fluticasone  1 spray Each Nare Daily Nadja Hal, CRNP     • folic acid  1 mg Oral Daily Nadja Hal, LUKE     • heparin (porcine)  5,000 Units Subcutaneous [facility] Ronie Boeck, MD     • hydrALAZINE  10 mg Oral Q8H PRN LUKE Nina     • hydrALAZINE  10 mg Oral TID LUKE Nina     • [START ON 9/26/2023] loratadine  10 mg Oral HS LUKE Nina     • magnesium Oxide  400 mg Oral Daily LUKE Nina     • melatonin  6 mg Oral HS Ronie Boeck, MD     • ofloxacin  5 drop Otic Daily Ronie Boeck, MD     • oxyCODONE  2.5 mg Oral Q8H PRN Ronie Boeck, MD     • pantoprazole  40 mg Oral Early Morning Ronie Boeck, MD     • polyethylene glycol  17 g Oral Daily PRN Ronie Boeck, MD     • polyethylene glycol  17 g Oral Daily Ronie Boeck, MD     • saccharomyces boulardii  250 mg Oral BID LUKE Nina     • senna  1 tablet Oral BID Ronie Boeck, MD     • sodium bicarbonate  1,300 mg Oral BID after meals Ronie Boeck, MD     • torsemide  20 mg Oral Every Other Day Ronie Boeck, MD     • traZODone  25 mg Oral HS Ronie Boeck, MD     • trimethobenzamide  200 mg Intramuscular Q6H PRN LUKE Nina            Chief Complaints:  Rehab follow-up     Subjective: On eval, patient reports improved headache and dizziness. He reports still having difficulty sleeping and would like to try other medication. He denies nausea, vomiting, worsening strength, sensation, vision, or other new complaints. ROS: A 10 point ROS was performed; negative except as noted above.        Physical Exam:  Temp:  [98 °F (36.7 °C)-99 °F (37.2 °C)] 98 °F (36.7 °C)  HR:  [66-73] 73  Resp:  [18-19] 18  BP: (140-150)/(64-80) 146/70  SpO2:  [97 %] 97 %  Vitals above reviewed on date of encounter    GEN:  Lying in bed in NAD   HEENT/NECK: Improving facial ecchymosis, MMM  CARDIAC: Regular rate rhythm, no murmers, no rubs, no gallops  LUNGS:  clear to auscultation, no wheezes, rales, or rhonchi  ABDOMEN: Soft, non-tender, non-distended, normal active bowel sounds  EXTREMITIES/SKIN:  no calf edema, no calf tenderness to palpation  NEURO:   MENTAL STATUS: Adequate wakefulness and interaction, able to follow commands, CN II-XII: Intact and Strength/MMT:  Near full throughout; FTN intact  PSYCH:  Affect:  Less flattened    HPI:  51-year-old male with a past medical history of 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 Patient was evaluated by neurosurgery recommended nonoperative management with seizure prophylaxis and repeat CT head in 2 weeks. Patient evaluated by ENT and also recommended nonoperative management. He did note some hearing loss and recommended follow-up with them which also should include an audiogram.  He was recommended Unasyn by trauma as well as Floxin drops in his right ear. Course notable for new onset A-flutter. Full anticoagulation and antiplatelets have been on hold due to bleed. He may require Eliquis in the future. Patient evaluated by skilled therapies and noted have significant decline in ADLs and mobility and appears appropriate for admission to acute rehab at this time.       ** Please Note: Fluency Direct voice to text software may have been used in the creation of this document. **    I personally performed the required components and examined the patient myself in person on 9/25/23.

## 2023-09-29 NOTE — PLAN OF CARE
Problem: SKIN/TISSUE INTEGRITY - ADULT  Goal: Skin Integrity remains intact(Skin Breakdown Prevention)  Description: Assess:  -Perform Dennis assessment every shift  -Clean and moisturize skin every every shift  -Inspect skin when repositioning, toileting, and assisting with ADLS  -  -Assess extremities for adequate circulation and sensation     Bed Management:  -Have minimal linens on bed & keep smooth, unwrinkled  -Change linens as needed when moist or perspiring  -Avoid sitting or lying in one position for more than 2hours while in bed  -Keep HOB at 30degrees     Toileting:  -Offer bedside commode        Activity:  -Mobilize patient 4times a day  -Encourage activity and walks on unit  -Encourage or provide ROM exercises   -Turn and reposition patient every 4Hours  -Use appropriate equipment to lift or move patient in bed  -Instruct/ Assist with weight shifting every 2 when out of bed in chair  -Consider limitation of chair time 4 hour intervals    Skin Care:  -Avoid use of baby powder, tape, friction and shearing, hot water or constrictive clothing    -Do not massage red bony areas    Next Steps:      Outcome: Progressing     Problem: SAFETY ADULT  Goal: Patient will remain free of falls  Description: INTERVENTIONS:  - Educate patient/family on patient safety including physical limitations  - Instruct patient to call for assistance with activity   - Consult OT/PT to assist with strengthening/mobility   - Keep Call bell within reach  - Keep bed low and locked with side rails adjusted as appropriate  - Keep care items and personal belongings within reach  - Initiate and maintain comfort rounds  - Make Fall Risk Sign visible to staff  - Offer Toileting every 4Hours, in advance of need  -   - Obtain necessary fall risk management equipment: rw  - Apply yellow socks and bracelet for high fall risk patients  - Consider moving patient to room near nurses station  Outcome: Progressing

## 2023-09-29 NOTE — ASSESSMENT & PLAN NOTE
Difficulty sleeping in the hospital setting. Currently on melatonin 6mg at HS. Started on trazodone 50mg at [facility] per Primary team on 9/25. Obtain sleep logs.

## 2023-09-29 NOTE — PROGRESS NOTES
09/29/23 0830   Pain Assessment   Pain Assessment Tool 0-10   Pain Score No Pain   Restrictions/Precautions   Precautions Fall Risk; Goins; Visual deficit   Weight Bearing Restrictions No   ROM Restrictions No   Comprehension   Comprehension (FIM) 6 - Has only MILD difficulty with complex/abstract info   Expression   Expression (FIM) 6 - Has only MILD difficulty with complex/abstract info   Social Interaction   Social Interaction (FIM) 6 - Interacts appropriately with others BUT requires extra  time   Problem Solving   Problem solving (FIM) 6 - Solves complex problems BUT requires extra time   Memory   Memory (FIM) 6 - Recognizes with extra time   Speech/Language/Cognition Assessmetn   Treatment Assessment Pt seen for skilled speech therapy session targeting cognitive linguistic communication skills. Pt alert and interactive- stated "I might go home today" Pt aware of repeat CT scan to occur today and pending results, pt may discharge late/end of day. Spoke with team, CT planned between therapy (1030-1pm), pt made aware. Reviewed again meds from yesterday- pt asking about bowel regimen at home. Pt previously used Miralax however stated his kidney doctor told him that it may impact his kidneys. Encouraged pt to discuss more with physician however educated that things in moderation should be appropriate. Discussed natural remedies including prune juice, whole prune, hot liquids (coffee/tea). Reviewed again plan for follow up with his PCP at discharge. Pt stated his doctors are all [facility] associated therefore explained they will have access to all notes/documenations via Epic. Reviewed progress since admission- pt currently functioning at [facility] with RW. Pt expressed he does have RW as well as rollator at home. Encouraged pt to wait to use rollator until cleared by home/outpt PT. PT with good comprehension. No further questions at this time- pt has made good progress while on ARC.  No further skilled SLP services warranted at discharge   SLP Therapy Minutes   SLP Time In 0830   SLP Time Out 0900   SLP Total Time (minutes) 30   SLP Mode of treatment - Individual (minutes) 30   SLP Mode of treatment - Concurrent (minutes) 0   SLP Mode of treatment - Group (minutes) 0   SLP Mode of treatment - Co-treat (minutes) 0   SLP Mode of Treatment - Total time(minutes) 30 minutes   SLP Cumulative Minutes 225   Therapy Time missed   Time missed?  No

## 2023-09-29 NOTE — NURSING NOTE
Medications and appointments gone over belongings sent with patient  Did go over about his skin tear he had night shift only put a bandaid on it patient was talking with wife about neosprin to use patient is getting visiting nurses also did stress about bp checks before meds patient d/[place] via w/c

## 2023-09-29 NOTE — ASSESSMENT & PLAN NOTE
Chronically elevated as OP  Stable  No s/s of acute infection; pt afebrile   Completed course of unasyn per prior reccs

## 2023-09-29 NOTE — SPEECH THERAPY NOTE
ARC Speech Therapy Discharge Summary    Pt admitted to [facility] on 2023 dx temporal bone fracture with associated subdural hematoma and subarachnoid hemorrhage. Pt completed the RIPA-.  Pt's overall degree of severity is WNL upon completion of the above stated subtests when compared to age matched peers 80 years and older. During informal interview prior to testing, pt oriented x4, with some recall of accident and current injuries sustained. Pt with some mild hearing issues given Right middle ear trauma from injury however did not impact for assessment purposes. Pt has visual deficits at baseline, macular degeneration. Pt does not drive, and wife completes majority of IADL tasks. Pt stated he does complete his own meds by using a magnifying glass to read the print. This was assessed with OT and SLP which pt was functional to complete organization on his own. Education complete with wife which she is aware to assist/supervise as needed with IADL tasks. Pt was able to achieve his goals of mod I for all cognitive domains. Pt was successfully discharged home on 2023 with no further SLP services warranted at this time.

## 2023-09-29 NOTE — ASSESSMENT & PLAN NOTE
Lab Results   Component Value Date    HGBA1C 6.3 06/05/2023       Recent Labs     09/27/23  1608 09/28/23  0624 09/28/23  1612 09/29/23  0548   POCGLU 148* 129 156* 110       Blood Sugar Average: Last 72 hrs:  (P) [id number]     Currently diet controlled at home. A1c 6.3 on 6/5/2023. Ilya Certain at home for renal protection. May resume on discharge. Continue BID accuchecks and cons. carb diet.

## 2023-09-29 NOTE — ASSESSMENT & PLAN NOTE
Home regimen: losartan 25mg daily, metoprolol tartrate 25mg Q12, and torsemide 20mg EOD. Currently receiving amlodipine 5mg daily, torsemide 20mg EOD, and hydralazine 10mg TID. Metoprolol currently on hold due to prolonged QTc and bradycardia. Maintain SBP <160 due to recent intracranial bleed and >140 due to renal function. Continue to monitor BP with routine VS.  Follows with Dr. Sangeeta Singh (Cardiology) and Dr. Katerine Mortensen (Nephrology) as outpatient.

## 2023-09-29 NOTE — PLAN OF CARE
Problem: GENITOURINARY - ADULT  Goal: Urinary catheter remains patent  Description: INTERVENTIONS:  - Assess patency of urinary catheter  - If patient has a chronic sweet, consider changing catheter if non-functioning  - Follow guidelines for intermittent irrigation of non-functioning urinary catheter  Outcome: Progressing     Problem: DISCHARGE PLANNING  Goal: Discharge to home or other facility with appropriate resources  Description: INTERVENTIONS:  - Identify barriers to discharge w/patient and caregiver  - Arrange for needed discharge resources and transportation as appropriate  - Identify discharge learning needs (meds, wound care, etc.)  - Arrange for interpretive services to assist at discharge as needed  - Refer to Case Management Department for coordinating discharge planning if the patient needs post-hospital services based on physician/advanced practitioner order or complex needs related to functional status, cognitive ability, or social support system  Outcome: Progressing

## 2023-09-29 NOTE — ASSESSMENT & PLAN NOTE
S/p fall on 9/15. [address] on 9/15 showed multicompartmental hemorrhage with small foci of subarachnoid hemorrhage in the bilateral inferior frontal region and additional small foci of subarachnoid hemorrhage. Repeat CTH on 9/17 stable. Changes noted on repeat CTH on 9/21. Neurosurgery follow-up this week. Hold AC/AP. Has been restarted on DVT ppx per Neurosurgery. Neurovascular checks Q shift. Maintain SBP <160. Completed Keppra for 7 days for seizure ppx. Follow-up with Neurovascular in 2 weeks with repeat CTH (9/29). CTH obtained for today. Scheduled for appt on 10/3. Primary team following. PT/OT. osteomyelitis [id number]

## 2023-09-29 NOTE — ASSESSMENT & PLAN NOTE
Experienced a-flutter in acute setting. ECHO on 9/16 showed EF 50-55%, abnormal diastolic inflow due to atrial flutter, L atrium moderately dilated, and R atrium mildly dilated. Cardiology consulted and following. No need for repeat ECHO inpatient. Will obtain as OP if patient continues to be in a-flutter. Follows with Dr. Cherylene Buddy (Cardiology) as outpatient.

## 2023-09-29 NOTE — ASSESSMENT & PLAN NOTE
2020       RE: Bernard Padilla  [address]  [place] [address]     Dear Colleague,    Thank you for referring your patient, Bernard Padilla, to the [facility] DERMATOLOGY at [facility]. Please see a copy of my visit note below.    Dermatology Phone Visit: Phone Number:[phone]    Dermatology Problem List:  1. Hidradenitis suppuritiva              - Diagnosed in 2016 and did not respond to antibiotics.               - Seen by Dr. Bird and underwent 9 procedures               -  mg BID - start , now discontinued              - Increased dapsone to 200 mg daily (started 19)  2. Acute onset hand foot psoriasiform dermatitis with joint pain, suspected reactive arthritis, now improving and almost resolved with minimal foot involvement              -  mg BID - started , now discontinued              - Lidex ointment at night, urea cream in the morning   3. Atopic dermatitis  4. Seborrheic dermatitis                        - DermaSmooth    Patient opted to conduct today's return visit via telephone vs an in person visit to the clinic.    Encounter Date: Sep 17, 2020    Chief complaint:   Chief Complaint   Patient presents with     Derm Problem     HS follow up, flaring inner thighs     I spoke with: Bernard Valerie    The reason for the telephone visit was:   HS flare    Pertinent history and review of systems:  Pt has been flaring off and on in left thigh and also having a new flare on the rt.  He increased dapsone two weeks ago. Will be having surgery with Dr Maldonado on 20.     Assessment/Advice/instructions given to patient/guardian including prescriptions, follow up appointment or orders for diagnostic testin) HS flare  - Pt is flaring and in significant pain  - Has gabapentin but doesn't find it helpful  - Sees Dr Foster for pain management and has contract with him--> spoke to him this evening  - Surgery is planned for   Wife has noted that patient snores at HS. Obtaining overnight noc ox on 9/22 to determine if patient has sleep apnea. Study showed desat for less than 2 minutes with desat as low as 81%. No need for O2 on discharge. Doing the dapsone currently.   - Clindamycin/Rifampin BID for 12 weeks    RTC in 4 weeks via phone    Phone call contact time: 14min  Call Started at: 2:37  Call Ended at: 2:51      Staff only:    Apolinar Ruiz MD, FAAD    Departments of Internal Medicine and Dermatology  [facility]  [phone]

## 2023-09-29 NOTE — DISCHARGE SUMMARY
Discharge Summary - PMR   May Catherine 80 y.o. male MRN: [id number]  Unit/Bed#: -44 Encounter: [id number]    Admission Date: 9/19/2023     Discharge Date: 9/29/2023    Rehabilitation/Etiologic Diagnosis:   Brain Dysfunction:  02.22  Traumatic, Closed Injury  Subdural and subarachnoid hemorrhage with temporal bone fracture     Discharge Diagnoses:      Patient Active Problem List   Diagnosis   • History of bladder cancer   • Ischemic cardiomyopathy   • Anemia   • Anemia due to stage 4 chronic kidney disease ([address])   • Fracture of temporal bone (HCC)   • SDH (subdural hematoma) ([address])   • Fall   • SAH (subarachnoid hemorrhage) ([facility])   • Pneumocephalus   • History of stroke   • Abnormal findings on imaging test   • Atrial flutter (HCC)   • Leukocytosis   • GERD (gastroesophageal reflux disease)   • Sinus congestion   • Insomnia   • Acute headache   • Snoring   • Skin tear of right forearm without complication       Acute Rehabilitation Center Course:     Patient participated in a comprehensive interdisciplinary inpatient rehabilitation program which included involvment of MD, therapies (PT, OT, and SLP), RN, CM/SW, dietary. He made signficant functional gains and was able to be advanced to a largely supervision to slight assist level of assist and is considered adequately safe for discharge home with family. Patient remains some fall/injury risk however this risk has decreased since admission to [facility]. Caregiver training completed and with adequate oversight patient is cleared for discharge home with family with supervision/assist.     Medical issues with comanagement from our internal medicine, neurosurgery teams as outlined below. Please see below for patient's hospital course and day to day management of medical needs with significant findings, complications (if applicable), treatment, and services provided in problem list format.       Decline in ADLs and mobility: Functional assessment - improving FIM  Care Score   Admit Score Recent Score    Total assist  1-100% or 2p    Tot       Max assist 2-51-75%    Sub LBD     Mod assist 3- 26-50%  Par       Min assist 3- 25% or < Par To hygiene, bathing Bathing   CG assist 4  TA    LBD, bathing   Sup/Setup 4-5 Sup UBD UBD, to hygiene   Mod-I/Indep 6 MI         Transfers   Min-mod assist  Supervision     Ambulation    150 ft mod assist  150 ft supervision      Stairs    4 steps min assist  12 steps supervision      Dispo: Home Friday with [facility] PT, OT, ST, RN and wife/family who has completed CG training        SDH (subdural hematoma) ([address])  Assessment & Plan  9/28 - function improved well; headache resolved, dizziness improved, nausea improved, sleep improved - plan for d/c home tomorrow pending results of [facility] and discussion with NSx  9/27 - appears to be doing quite a bit better today; improved interaction, fatigue, and headache which is encouraging - continue to monitor closely   9/26 - slept better overnight, improving overall   9/25 - appears to be better today; but still difficulty sleeping - neuro exam stable; will trial adding trazodone 25mg HS   9/22 - clinically better appearing and functioning; BP stabilizing some    9/21 - BP elevated significantly at times, some increased fatigue, headache   - 9/21 - CTH - Interval increasing CSF density right subdural hygroma with increasing mass effect on the sulci. Trace midline shift to the left. Stable hyperdense hemorrhages seen in the right subarachnoid/subdural along the right frontal and right temporal regions. No new area of acute hemorrhage demonstrated. stable left frontal inferior contusions.  Stable ventricular size and stable volume of the hyperdense layering blood products in the occipital horns.    - Discussed with NSx with tele-c/s - stable to remain in ARC setting with plan to repeat CTH in 1 week 9/29 and at that time discuss embolization   -  monitor neuro exam closely and if decline repeat CTH in interim   - Optimal BP mgmt per IM  - Fall while carrying heavy object into truck causing head trauma and multicompartmental hemorrhage with small foci of subarachnoid hemorrhage in the bilateral inferior frontal region and additional small foci of subarachnoid hemorrhage as well as R significant temporal bone fracture and small scalp hematoma in right occipito-temporal region.  - Per NSx - CTA/CTV on hold due to low GFR and CKD 4. Consider in the outpatient setting if there is concern for sinus thrombosis given pneumocephalus within the sinus near the area of fracture. - Residual impairments: Imbalance, incoordination, cog deficits (assess for other impairments during ARC course)   - Cleared for heparin 5000 U SQ TID for VTE prophylaxis   - Repeat Head CT Fri 9/29 and follow-up with NSx after  - Keppra seizure prophylaxis thru 9/21 completed     - Current/recent mood/affect/behavior/cog - less flattened, more interactive   - Remains at risk for increased confusion/delirium, restlessness, agitation, and fall - continue to monitor for concerns/changes  - Current psychotropics and potentially sedating medications:   • Citalopram  • (Keppra stopped 9/21)   • Melatonin 6mg HS  • Trazodone 25mg HS  • PRN Oxy 2.5mg > not needing   - Monitor for need for 1:1 (Notify MD of potentially dangerous behavior such as significant impulsivity and trying to stand/get out of bed/chair unattended that could lead to fall/injury); High fall precautions with frequent rounding, patient close to nursing station if possible, frequent toileting/frequent offering urinal/bedpan (only if too immobile for safe toileting);  bed/chair alarm on at all times (high setting if needed); fall lj on side of bed (at discretion of nursing/therapy); do not leave unattended in bathroom, patient education; call bell availability; Sleep/agitation log, frequent redirection, reorientation, reassurance;  Family access to patient if helpful  - No recent reports of dangerous/risky behavior requiring 1:1 at this point but monitor closely  - Continue acute comprehensive interdisciplinary inpatient rehabilitation to include intensive skilled therapies (PT, OT, ST) with oversight and management by rehabilitation physician. Inpatient rehab level nursing, case management and weekly interdisciplinary team meetings. Provide patient and (if available) caregiver/family teaching regarding brain injury/residual disability management. - For routine restlessness, anxiety, irritability focus on non-pharmacologic management    - Obtain MOCA when cog status stabilized   - Please assess iADLs - ability to manage medications, meal prep, finances, obtaining appropriate transport from community, managing emergencies, and overall safety in home environment. - Provide therapy, compensatory strategies, and if available and necessary provide caregiver training. Notify MD of impairments or inability to perform iADLs adequately.    - Monitor neuro-exam, wakefulness, mood, cognition, insight into deficits and safety awareness   - Monitor and ensure optimal management electrolytes, nutrition, and hydration  - Monitor for signs or symptoms of infection, medication intolerances, other systemic etiologies  - Additional labs, imaging, specialist follow-up as needed   - Patient/family/caregiver education and training   - Overstimulation precautions, frequent re-orientation, re-direction, re-assurance  - Optimal mood, pain, and sleep management  - Sleep log and agitation monitoring    - Limit sedating medications when possible  - Follow-up with NeuroSx after discharge and if needed during ARC course as well as PCP      * SAH (subarachnoid hemorrhage) ([address])  Assessment & Plan  See SDH     Fracture of temporal bone ([address])  Assessment & Plan  - Acute comminuted otic sparing right temporal bone fracture involving the squamosal, mastoid, and tympanic portions with diastases of occipitotemporal suture and extension of fracture into visualized right occipital calvarium. Recommend follow-up CTV   head with contrast to assess underlying patency of right dural venous sinuses. - Probable small-to-moderate RIGHT-sided bloody mastoid effusion, large bloody middle ear effusion, and probable debris and blood products throughout external auditory canal.  - Small scalp hematoma in right occipito-temporal region.     LEFT TEMPORAL BONE CT  -Trace left mastoid effusion. Otherwise, normal LEFT temporal bone CT.     Per ENT  "right otic sparing temporal bone fracture   -Facial nerve intact, no need for surgical decompression  -Hearing loss in right ear- tuning fork exam consistent with conductive pattern secondary to debris in canal and middle ear-Recommend outpatient ENT follow-up with audiogram 1 week following discharge  -Floxin drops twice daily 5 drops to R ear for 10 days due to debris in canal  -Please do not hesitate to reach out with any questions or concerns"  - OK to use Floxin drops in ear - will need full 10 day course starting now (was getting in eye) thru 9/28  - Unasyn for 1 week course completed 9/22  - Did not undergo CTV/CTA due to concern for kidney function and now NSx not currently recommending it         History of bladder cancer  Assessment & Plan  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  - Monitor site and for s/s of infection  - Receiving Avelunab every 14 days and Aranesp - on hold with acute medical decline   - Imaging of C/A/P concerning for mets - per discharging provider "soft tissue mass, hepatic lesions, peritoneal stippling consistent with metastatic bladder cancer new from last CAT scan in August 2023"  - Follow-up with H-O - patient expected to be functionally ready for d/c Friday after repeat [place] and can follow-up with H-O following week     Skin tear of right forearm without complication  Assessment & Plan  Wound care c/s  "Wound to right arm is partial thickness with beefy red fragile tissue with scant sanguineous drainage. Periwound skin is fragile.    Plan:   Clean with NSS, pad dry completely, apply xeroform only over wound area. Cover with DSD and Jatinder Pinta; Change Daily."      Insomnia  Assessment & Plan  Improving Melatonin 6mg HS  Continue trazodone 25mg HS  Sleep log     Abnormal findings on imaging test  Assessment & Plan  CTA chest/abd/pelvis on 9/15 with multiple concerning lymph nodes/lesions for metastasis including: nodule at the L obturator internus muscle, L sided mesenteric lymph nodes, R external iliac lymph node, R sided retroperitoneal lymph nodes, hypodense structure along with R pelvic sidewall, lesions in the R inferior hepatic lobe, peritoneal soft tissue nodularity, R cardiophrenic lymph node, 2 enlarged retrocrural lymph nodes, and R middle lobe nodule. Follow-up with Hematology/Oncology after d/c planned for late this week     Atrial flutter ([address])  Assessment & Plan  IM consulted and with overall management at their discretion during ARC course who has now c/s'd Cards  holding MTP to avoid AV josh blocker to avoid harmony  Rate control:  None  Antithrombotic: None - consider NOAC when cleared by NSx  Follow-up with OP Cards      Benign hypertension with chronic kidney disease, stage IV [facility])  Assessment & Plan  Lab Results   Component Value Date    EGFR 24 09/28/2023    EGFR 29 09/25/2023    EGFR 29 09/20/2023    CREATININE 2.38 (H) 09/28/2023    CREATININE 2.05 (H) 09/25/2023    CREATININE 2.04 (H) 09/20/2023   Improved BP control  Cards and Internal medicine consulted and co-management with their service  Monitor vitals with and without activity; monitor for orthostasis  Monitor hemoglobin, electrolytes, kidney function, hydration status   Per IM  "Home regimen: losartan 25mg daily, metoprolol tartrate 25mg Q12, and torsemide 20mg EOD.   Currently receiving amlodipine 5mg daily, torsemide 20mg EOD, and hydralazine 10mg TID. Metoprolol currently on hold due to prolonged QTc and bradycardia. Maintain SBP <160 due to recent intracranial bleed and >140 due to renal function. Continue to monitor BP with routine VS.  Follows with Dr. Mich Cunningham (Cardiology) and Dr. Ray Schofiedl (Nephrology) as outpatient."  - Patient has actually been on amlodipine 5mg BID and been stable on this which we will continue along with torsemide 20mg QOD and hydralazine 10mg TID     Snoring  Assessment & Plan  Per IM  "Wife has noted that patient snores at HS. Obtaining overnight noc ox on 9/22 to determine if patient has sleep apnea. Study showed desat for less than 2 minutes with desat as low as 81%. No need for O2 on discharge."    At risk for venous thromboembolism (VTE)  Assessment & Plan  SCDs, ambulation, and heparin       GERD (gastroesophageal reflux disease)  Assessment & Plan  PPI    Leukocytosis  Assessment & Plan  Chronically elevated as OP  Stable  No s/s of acute infection; pt afebrile   Completed course of unasyn per prior reccs      History of stroke  Assessment & Plan  Per IM  "[address] on 9/16 showed chronic appearing R caudate lacunar infarct. Currently on Lipitor 40mg daily. Would benefit from starting antiplatelet - would need to discuss with Neurosurgery after follow-up in 2 weeks. Continue with PT/OT."    Anemia due to stage 4 chronic kidney disease (HCC)  Assessment & Plan  Hb stable 9/28  OP Follow-up PCP and nephro    Stage 4 chronic kidney disease ([address])  Assessment & Plan  Cr 2.38 prior to d/c  Per IM Baseline creatinine 2.5-2.8. Monitor BUN/Cr intermittently as well as electrolytes   IM consulted to manage  Limit nephrotoxic agents when possible  Optimal BP mgmt   Per IM  "Currently being seen by Vascular for possible fistula creation as OP - on hold due to cancer treatment. Continue sodium bicarbonate 1300mg BID.   Follows with Nephrology as outpatient - Dr. Ray Schofield."  - Dennise Garcia to resume chronic calcitriol per discussion with IM prior to d/c       Anxiety and depression  Assessment & Plan  Celexa  Supportive counseling  Psychology consult while in [address] on and continue to encourage deep breathing/relaxation/behavioral management techniques      Ischemic cardiomyopathy  Assessment & Plan  IM consulted and with overall management at their discretion during ARC course (EF50-55%)  OP Cards     Coronary artery disease involving native coronary artery of native heart without angina pectoris  Assessment & Plan  IM consulted and with overall management at their discretion during ARC course  Antithrombotic: Currently none (was not on in OP setting) - consider after clearance with NSx   Statin  Optimal blood pressure and blood sugar control      Hyperlipidemia  Assessment & Plan  Statin     DMII (diabetes mellitus, type 2) ([address])  Assessment & Plan  Lab Results   Component Value Date    HGBA1C 6.3 06/05/2023       Recent Labs     09/27/23  1608 09/28/23  0624 09/28/23  1612 09/29/23  0548   POCGLU 148* 129 156* 110       Blood Sugar Average: Last 72 hrs:  (P) [id number]     Internal medicine consulted and management at their discretion  Monitor for signs and symptoms of hypoglycemia   Current meds: None recently in [facility] but ok to go back on home Farxiga per IM-AP Guy Kelley    consistent carb diabetic diet          Follow-up providers (see above for specific issues to follow-up):  PCP  Neurosurgery  ENT  Oncology/urology  Cardiology  Chronic providers       - See AVS (After visit summary) with discharge instructions, discharge medications, and other details. Imaging (See above as well):  CT head wo contrast   Final Result by Theodora Richter MD (09/21 1525)      Interval increasing CSF density right subdural hygroma with increasing mass effect on the sulci. Trace midline shift to the left.       Stable hyperdense hemorrhages seen in the right subarachnoid/subdural along the right frontal and right temporal regions. No new area of acute hemorrhage demonstrated. Stable left frontal inferior contusions. Stable ventricular size and stable volume of the hyperdense layering blood products in the occipital horns. The study was marked in [facility] for immediate notification.       Workstation performed: [id number]         CT head wo contrast    (Results Pending)       Pertinent Recent Labs (See Course above, EPIC EMR, and if needed request additional records):   Latest Reference Range & Units 09/28/23 05:40   Sodium 135 - 147 mmol/L 139   Potassium 3.5 - 5.3 mmol/L 4.0   Chloride 96 - 108 mmol/L 103   CO2 21 - 32 mmol/L 23   Anion Gap mmol/L 13   BUN 5 - 25 mg/dL 32 (H)   Creatinine 0.60 - 1.30 mg/dL 2.38 (H)   Glucose, Random 65 - 140 mg/dL 123   Calcium 8.4 - 10.2 mg/dL 8.7   eGFR ml/min/1.73sq m 24   WBC 4.31 - 10.16 Thousand/uL 12.13 (H)   Red Blood Cell Count 3.88 - 5.62 Million/uL 3.45 (L)   Hemoglobin 12.0 - 17.0 g/dL 10.3 (L)   HCT 36.5 - 49.3 % 32.4 (L)   MCV 82 - 98 fL 94   MCH 26.8 - 34.3 pg 29.9   MCHC 31.4 - 37.4 g/dL 31.8   RDW 11.6 - 15.1 % 17.7 (H)   Platelet Count 359 - 390 Thousands/uL 232   (H): Data is abnormally high  (L): Data is abnormally low    Procedures Performed During ARC Admission: None    Discharge Physical Examination:  Temp:  [97.2 °F (36.2 °C)-98.4 °F (36.9 °C)] 97.2 °F (36.2 °C)  HR:  [64-68] 68  Resp:  [18] 18  BP: (118-144)/(56-68) 144/66  SpO2:  [94 %-96 %] 96 %  GEN:  Sitting in NAD   HEENT/NECK: MMM, largely resolved facial edema and ecchymosis  CARDIAC: Regular rate rhythm, no murmers, no rubs, no gallops  LUNGS:  clear to auscultation, no wheezes, rales, or rhonchi  ABDOMEN: Soft, non-tender, non-distended, normal active bowel sounds  EXTREMITIES/SKIN:  no calf edema, no calf tenderness to palpation  NEURO:   MENTAL STATUS: awake, oriented to person, place, time, and situation, MENTAL STATUS:  Appropriate wakefulness and interaction , CN II-XII: grossly intact , Strength/MMT:  Full throughout  and FTN intact  PSYCH:  Affect:  Euthymic     HPI:   80-year-old male with a past medical history of 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 Patient was evaluated by neurosurgery recommended nonoperative management with seizure prophylaxis and repeat CT head in 2 weeks. Patient evaluated by ENT and also recommended nonoperative management. He did note some hearing loss and recommended follow-up with them which also should include an audiogram.  He was recommended Unasyn by trauma as well as Floxin drops in his right ear. Course notable for new onset A-flutter. Full anticoagulation and antiplatelets have been on hold due to bleed. He may require Eliquis in the future. Patient evaluated by skilled therapies and noted have significant decline in ADLs and mobility and appears appropriate for admission to acute rehab at this time.     Chief complaint:   Headache     Subjective:   On eval, patient notes some headache and facial pain since fall. He denies acute worsening. He notes impaired appetite, occasional nausea. Patient denies vertigo. He does note some dizziness at times. Patient denies focal changes in strength or sensation, fever, chills, sweats, abdominal pain, calf pain, shortness of breath, or other new complaints.     Review of Systems: A 10 point ROS was performed; negative except as noted above.      Condition at Discharge: stable     Discharge instructions/Information to patient and family:   See after visit summary for information provided to patient and family.       Provisions for Follow-Up Care:  See after visit summary for information related to follow-up care and any pertinent home health orders. Disposition: Home with [facility] and family     Planned Readmission:  No    Discharge Statement   Total time spent examining Rosalva Ocampo, counseling patient (or patient/family) on condition, medication, rehabilitation/medical plan, and coordinating care on day of discharge: at least 45 minutes. Greater than 50% of the total time was spent examining patient, answering all questions, directly discussing plan of care and post-discharge instructions with patient (or patient and family) some of which specifically related to recent SDH. Additional time spent on coordinating care and other discharge activities. Discharge Medications:  See after visit summary for reconciled discharge medications provided to patient and family.

## 2023-09-29 NOTE — DISCHARGE INSTR - AVS FIRST PAGE
Neurosurgery discharge instructions following traumatic head bleed:     Do not take any blood thinning medications (ie. No Advil. No motrin. No ibuprofen. No Aleve. No Aspirin. No fishoil. No heparin. No antiplatelet / no anticoagulation medication). Refrain from activity that increases chance of trauma to head or falls. Recommend you take fall precaution. No strenuous activity or sports. Return to hospital Emergency Room if you experience worsening / new headache, nausea/vomiting, speech/vision change, seizure, confusion / mental status change, weakness, or other neurological changes. Follow-up as scheduled with a repeat CT head without contrast to be completed 2-3 days prior to visit. Prescription has been entered electronically. Please call [phone] to schedule. DISCHARGE INSTRUCTIONS: [id number]    Bring these instructions with you to your Outpatient Physician appointments so they can order and follow-up any additional lab work or imaging recommended at time of discharge. Resume follow-up with all your prior providers that you have established care prior to this hospitalization. Discuss with primary care physician (PCP) if you have additional questions. It  is you or your caregivers responsibility to obtain follow-up MEDICATION REFILLS  As indicated through your Primary Care Physician (PCP) and other outpatient specialty provider(s) after discharge. Please follow-up with your PCP as soon as possible after discharge to set-up follow-up management and when appropriate refills. You have been determined to have some cognitive impairments.  - It is YOUR CAREGIVER'S RESPONSIBILITY to ensure appropriate follow-up which includes:  - APPOINTMENTS are scheduled and safe transportation is arranged  - LABS and IMAGING are completed  - MEDICATION MANAGEMENT at home is carried out appropriately     You remain a fall and injury risk which could be severe. - Your risk of fall has decreased however since admission to acute rehab. Caregiver training has been completed with our staff. - Appropriate supervision +/- assistance as instructed during your rehab course is recommended to decrease risk of fall and injury. - Continue skilled therapy as discussed after discharge to further decrease this risk    If you (or your health care proxy) have any questions or concerns regarding your acute rehabilitation stay including issues with medications, rehabilitation, and follow-up plan, please call:          [facility] Rehabilitation Unit at [facility] at [phone]    Should you develop fevers, chills, new weakness, changes in sensation, difficulty speaking, facial weakness, confusion, shortness of breath, chest pain, or other concerning symptoms please call 911 and/or obtain transportation to nearest ER immediately. Should you develop worsening pain, swelling, or drainage notify your surgeon right away or obtain transportation to nearest ER for evaluation. Should you develop feelings of significant hopelessness, severe depression, severe anxiety, or suicide you should call 911 or obtain transportation to nearest ER. 24-7 [employer] suicide and crisis lifeline call "65"  Juanito Henson is [phone] and is available 24 hrs/7 days a week. [facility] [phone] is available 24 hrs/7 days for mental health  [facility] ([facility]) AT [place] [phone] is available 24 hrs/7 days for mental health   [address] [place] [address] [address] [facility] [address] [phone]    PHYSICIANS to see:  Please see your doctors listed in the follow up providers section of your discharge paperwork, and take the discharge paperwork with you to your appointments. Home health has been ordered for you: Your home health agency should reach out to you or your family soon to arrange follow-up.     (If  [facility] home health is your provider their phone number is [phone])    If you are unable to get in touch with home health you may contact your  at [phone]. [address] [facility]    LAB WORK recommended after discharge: Follow-up lab work at discretion of your outpatient physicians to be determined at time of your future appointments. Also, obtain the following lab orders and follow-up management through primary care physician and outpatient specialists. CBC and BMP to monitor hemoglobin, white blood cell count, electrolytes and kidney function at next visit within 1-2 weeks  - Recent anemia, elevated white blood cell count, and chronic kidney disease     Excessive delay in labs and appropriate follow-up could potentially increase your risk of complications which could be severe and even life-threatening. It is you or your caregiver's responsibility to ensure these tests are ordered by your outpatient providers and followed up with accordingly. IMAGING, ADDITIONAL FINDINGS and ISSUES to follow-up:  Check under the "DISCHARGE PROVIDER" section of these DISCHARGE INSTRUCTIONS for provider contact information and specific issues as well.       Follow-up the following with ENT specialist  Current CT head  Unchanged subacute comminuted otic sparing right temporal bone fracture involving the squamosal, mastoid, and tympanic portions with diastases of occipitotemporal suture and extension of fracture into right occipital calvarium    Prior CT head  Non-displaced fracture through the R temporal bone, linear non-displaced fracture through the posterior wall of the condyle with middle ear hemorrhage, fracture passes through the hypotympanum, non-displaced fracture through the R occipital bone  - Loss of hearing     Follow-up with oncology   CTA chest/abd/pelvis on 9/15 with multiple concerning lymph nodes/lesions for metastasis including: nodule at the L obturator internus muscle, L sided mesenteric lymph nodes, R external iliac lymph node, R sided retroperitoneal lymph nodes, hypodense structure along with R pelvic sidewall, lesions in the R inferior hepatic lobe, peritoneal soft tissue nodularity, R cardiophrenic lymph node, 2 enlarged retrocrural lymph nodes, and R middle lobe nodule. Follow-up with neurosurgery   CT head 9/29     No acute intracranial abnormality. Multifocal intracranial hemorrhages have improved since prior exam, as detailed below:  - Improved trace residual subacute subarachnoid hemorrhage in right anterior frontal region.  - Improved small subacute brain contusions in bilateral anterior frontal lobes with resolved hyperdense blood products and mild underlying vasogenic edema. - Improved trace residual subacute intraventricular hemorrhage layering occipital horn of left lateral ventricle. 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 Prior CT head  Interval increasing CSF density right subdural hygroma with increasing mass effect on the sulci. Trace midline shift to the left. Stable hyperdense hemorrhages seen in the right subarachnoid/subdural along the right frontal and right temporal regions. No new area of acute hemorrhage demonstrated. Stable left frontal inferior contusions. Stable ventricular size and stable volume of the hyperdense layering blood products in the occipital horns. Follow-up imaging as discussed with your recent physicians or at discretion of your outpatient physicians to be determined at time of your appointments. Excessive delay or lack of appropriate follow-up could potentially increase your risk of complications which could be severe and even life-threatening. It is you or your caregiver's responsibility to ensure these tests are ordered by your outpatient providers and followed up with accordingly.       SKIN CARE INSTRUCTIONS to follow:    Forearm skin tear  Clean with gentle soap and water, pad dry completely, apply xeroform only over wound area. Cover with dry sterile dressing and Arlyn; Change Daily. Monitor skin for increased redness or breadown and promptly notify your physician should these develop    Home health nursing will assist you with wound management. You may contact them with issues as well once this service is set-up. If [facility] is your provider their phone number is [phone]. If you are unable to get in touch with home health you may contact your  at [phone]. WEIGHTBEARING/ACTIVITY PRECAUTIONS to follow:    24 hours/7 days per week supervision by caregiver required    Weightbearing as tolerated. Driving restrictions: You are recommended against driving until cleared by an outpatient physician. Work restrictions: You should NOT return to work (if working) until cleared by an outpatient physician. You should not operate heavy machinery (if applicable) until cleared by an outpatient physician. Alcohol restrictions: You are recommended to not drink alcohol at this time unless cleared by an outpatient physician. Drinking alcohol in your current functional condition can increase your risk of injury which could be severe. Drinking alcohol given your current health problems can lead to increased medical complications which could be severe. Combining alcohol with your current medications can increase your risk of injury which could be severe. Smoking restrictions: You are recommended to not smoke nicotine. Smoking increases your risk of heart attack, stroke, emphysema/COPD, and lung cancer. MEDICATIONS:  Please see a full list of your medications outlined in the After Visit Summary that is attached to these Discharge Instructions.   Please note changes may have been made to your medications please refer to your discharge paperwork for your current medications and take this list with you to all your doctors appointments for your doctors to review. Please do not resume a home medication unless the medication reconciliation sheet indicates to do so, please do not assume that a medication that you were given a prescription for is the same as a medication you have at home based on both medications having the same name as dosages and frequency may have changed. Unless specifically noted in your medication list provided to you in your discharge paper work do not resume prior vitamins, minerals, or supplements you may have been taking prior to your hospitalization unless instructed by an outpatient physician in the future. Discuss with your primary care at next visit if applicable. AVOID blood thinners and NSAIDs (Non-steroidal anti-inflammatory drugs) FOR NOW:  At this time, your medical providers are recommending you do NOT take any blood thinners unless instructed by another physician in the future. Follow-up with your primary care physician and if applicable appropriate specialists after discharge for follow-up management. For example: No Aspirin. No warfarin. No Eliquis. No Xarelto. No Advil. No Alleve. No Motrin/Ibuprofen. No Naproxen. No meloxicam. No oral diclofenac. Etc. WITH THAT SAID; You may be appropriate for anticoagulation such as Eliquis once cleared by Neurosurgery in the near future for history of atrial flutter that can increase risk of strokes; You also may be appropriate for aspirin once cleared by neurosurgery given evidence of lacunar stroke on recent imaging    Do NOT start blood thinners until cleared by Neurosurgery and than discussed with your primary care provider and cardiologist - follow-up with all these providers promptly after discharg    Sedating Medications with increased risk of complications:    Trazodone has been used to help you sleep. You tolerated this medication adequately during your recent hospital stay. Nevertheless, the medication can increase your risk of confusion and falls (injury) which we have discussed with your prior to discharge. If you developing any concerning symptoms stop medication and notify your physician. - Take 25 mg at bedtime as needed for sleep. - Do not take with alcohol (or marijuana/cannabis) while on this medication as this can cause increased confusion, breathing problems, falls, and severe injury. MEDICAL MANAGEMENT AT HOME specific to you:      Hypertension Management:  Only take the medications prescribed for you at time of discharge - overly high or low blood pressure increases your risk for health complications    Follow-up with PCP/family doctor and nephrologist regularly to ensure blood pressure remains adequately controlled. Cardiomyopathy Management:  Follow-up management by your cardiologist.  Please set-up follow-up appointment as soon as possible after your discharge. Take your medications as prescribed at time of discharge unless changed by another physician in future. You will need to obtain follow-up lab work as an outpatient to monitor effectiveness and safety of these medications. Obtain follow-up lab orders from your outpatient providers at their discretion. You should weigh yourself every day or every other day and keep a log that you bring to your physicians office. Notify your physician if you start to gain more than 2-4 lbs in a few days. These recommendations are meant to prevent build up of excessive fluid inside you which can make it harder to breath. Follow-up with your outpatient physicians who may change these recommendations in the future. Notify your physicians should you develop increased swelling, weight gain, or shortness of breath. If you develop significant shortness of breath, chest pain, confusion, or other concerning signs or symptoms call 911 or obtain transportation to nearest emergency room right away.       Please note a summary of your hospital stay with relevant information for your doctors will try to be sent to them. Please confirm with your doctors at your follow up visits that they have received this summary and have them contact [address] if they have not received them along with any other medical records they may require.      [place] Phone Number:  [phone]

## 2023-09-29 NOTE — PROGRESS NOTES
Physical Medicine and Rehabilitation Progress Note  Edel Schuler 80 y.o. male MRN: [id number]  Unit/Bed#: [facility] 242-69 Encounter: [id number]      Assessment & Plan:     Decline in ADLs and mobility: Functional assessment - improving         FIM  Care Score  Admit Score Recent Score    Total assist  1-100% or 2p    Tot     Max assist 2-51-75%    Sub LBD    Mod assist 3- 26-50%  Par     Min assist 3- 25% or < Par To hygiene, bathing Bathing   CG assist 4  TA   LBD, bathing   Sup/Setup 4-5 Sup UBD UBD, to hygiene   Mod-I/Indep 6 MI      Transfers  Min-mod assist  Supervision    Ambulation   150 ft mod assist  150 ft supervision     Stairs   4 steps min assist  12 steps supervision     Goal: Supervision for most ADLs and for mobility  Major barriers:  Impaired cog, balance, risk of complications   Dispo: Home Friday with  PT, OT, ST, RN      SDH (subdural hematoma) ([address])  Assessment & Plan  9/28 - function improved well; headache resolved, dizziness improved, nausea improved, sleep improved - plan for d/c home tomorrow pending results of [address] and discussion with NSx  9/27 - appears to be doing quite a bit better today; improved interaction, fatigue, and headache which is encouraging - continue to monitor closely   9/26 - slept better overnight, improving overall   9/25 - appears to be better today; but still difficulty sleeping - neuro exam stable; will trial adding trazodone 25mg HS   9/22 - clinically better appearing and functioning; BP stabilizing some    9/21 - BP elevated significantly at times, some increased fatigue, headache   - 9/21 - CTH - Interval increasing CSF density right subdural hygroma with increasing mass effect on the sulci. Trace midline shift to the left. Stable hyperdense hemorrhages seen in the right subarachnoid/subdural along the right frontal and right temporal regions. No new area of acute hemorrhage demonstrated. stable left frontal inferior contusions.  Stable ventricular size and stable volume of the hyperdense layering blood products in the occipital horns.    - Discussed with NSx with tele-c/s - stable to remain in ARC setting with plan to repeat CTH in 1 week 9/29 and at that time discuss embolization   -  monitor neuro exam closely and if decline repeat CTH in interim   - Optimal BP mgmt per IM  - Fall while carrying heavy object into truck causing head trauma and multicompartmental hemorrhage with small foci of subarachnoid hemorrhage in the bilateral inferior frontal region and additional small foci of subarachnoid hemorrhage as well as R significant temporal bone fracture and small scalp hematoma in right occipito-temporal region.  - Per NSx - CTA/CTV on hold due to low GFR and CKD 4. Consider in the outpatient setting if there is concern for sinus thrombosis given pneumocephalus within the sinus near the area of fracture. - Residual impairments: Imbalance, incoordination, cog deficits (assess for other impairments during ARC course)   - Cleared for heparin 5000 U SQ TID for VTE prophylaxis   - Repeat Head CT Fri 9/29 and follow-up with NSx after  - Keppra seizure prophylaxis thru 9/21 completed     - Current/recent mood/affect/behavior/cog - less flattened, more interactive   - Remains at risk for increased confusion/delirium, restlessness, agitation, and fall - continue to monitor for concerns/changes  - Current psychotropics and potentially sedating medications:   • Citalopram  • (Keppra stopped 9/21)   • Melatonin 6mg HS  • Trazodone 25mg HS  • PRN Oxy 2.5mg > not needing   - Monitor for need for 1:1 (Notify MD of potentially dangerous behavior such as significant impulsivity and trying to stand/get out of bed/chair unattended that could lead to fall/injury);  High fall precautions with frequent rounding, patient close to nursing station if possible, frequent toileting/frequent offering urinal/bedpan (only if too immobile for safe toileting);  bed/chair alarm on at all times (high setting if needed); fall lj on side of bed (at discretion of nursing/therapy); do not leave unattended in bathroom, patient education; call bell availability; Sleep/agitation log, frequent redirection, reorientation, reassurance; Family access to patient if helpful  - No recent reports of dangerous/risky behavior requiring 1:1 at this point but monitor closely  - Continue acute comprehensive interdisciplinary inpatient rehabilitation to include intensive skilled therapies (PT, OT, ST) with oversight and management by rehabilitation physician. Inpatient rehab level nursing, case management and weekly interdisciplinary team meetings. Provide patient and (if available) caregiver/family teaching regarding brain injury/residual disability management. - For routine restlessness, anxiety, irritability focus on non-pharmacologic management    - Obtain MOCA when cog status stabilized   - Please assess iADLs - ability to manage medications, meal prep, finances, obtaining appropriate transport from community, managing emergencies, and overall safety in home environment. - Provide therapy, compensatory strategies, and if available and necessary provide caregiver training. Notify MD of impairments or inability to perform iADLs adequately.    - Monitor neuro-exam, wakefulness, mood, cognition, insight into deficits and safety awareness   - Monitor and ensure optimal management electrolytes, nutrition, and hydration  - Monitor for signs or symptoms of infection, medication intolerances, other systemic etiologies  - Additional labs, imaging, specialist follow-up as needed   - Patient/family/caregiver education and training   - Overstimulation precautions, frequent re-orientation, re-direction, re-assurance  - Optimal mood, pain, and sleep management  - Sleep log and agitation monitoring    - Limit sedating medications when possible  - Follow-up with NeuroSx after discharge and if needed during ARC course as well as PCP      * SAH (subarachnoid hemorrhage) ([address])  Assessment & Plan  See SDH     Fracture of temporal bone (HCC)  Assessment & Plan  - Acute comminuted otic sparing right temporal bone fracture involving the squamosal, mastoid, and tympanic portions with diastases of occipitotemporal suture and extension of fracture into visualized right occipital calvarium. Recommend follow-up CTV   head with contrast to assess underlying patency of right dural venous sinuses. - Probable small-to-moderate RIGHT-sided bloody mastoid effusion, large bloody middle ear effusion, and probable debris and blood products throughout external auditory canal.  - Small scalp hematoma in right occipito-temporal region.     LEFT TEMPORAL BONE CT  -Trace left mastoid effusion. Otherwise, normal LEFT temporal bone CT.     Per ENT  "right otic sparing temporal bone fracture   -Facial nerve intact, no need for surgical decompression  -Hearing loss in right ear- tuning fork exam consistent with conductive pattern secondary to debris in canal and middle ear-Recommend outpatient ENT follow-up with audiogram 1 week following discharge  -Floxin drops twice daily 5 drops to R ear for 10 days due to debris in canal  -Please do not hesitate to reach out with any questions or concerns"  - OK to use Floxin drops in ear - will need full 10 day course starting now (was getting in eye) thru 9/28  - Unasyn for 1 week course completed 9/22  - Did not undergo CTV/CTA due to concern for kidney function and now NSx not currently recommending it         History of bladder cancer  Assessment & Plan  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  - Monitor site and for s/s of infection  - Receiving Avelunab every 14 days and Aranesp - on hold with acute medical decline   - Imaging of C/A/P concerning for mets - per discharging provider "soft tissue mass, hepatic lesions, peritoneal stippling consistent with metastatic bladder cancer new from last CAT scan in August 2023"  - Follow-up with H-O - patient expected to be functionally ready for d/c Friday after repeat [facility] and can follow-up with H-O following week     Skin tear of right forearm without complication  Assessment & Plan  Wound care c/s    "Wound to right arm is partial thickness with beefy red fragile tissue with scant sanguineous drainage. Periwound skin is fragile.    Plan:   Clean with NSS, pad dry completely, apply xeroform only over wound area. Cover with DSD and Debra You; Change Daily."      Insomnia  Assessment & Plan  Improving Melatonin 6mg HS  Continue trazodone 25mg HS  Sleep log     Abnormal findings on imaging test  Assessment & Plan  CTA chest/abd/pelvis on 9/15 with multiple concerning lymph nodes/lesions for metastasis including: nodule at the L obturator internus muscle, L sided mesenteric lymph nodes, R external iliac lymph node, R sided retroperitoneal lymph nodes, hypodense structure along with R pelvic sidewall, lesions in the R inferior hepatic lobe, peritoneal soft tissue nodularity, R cardiophrenic lymph node, 2 enlarged retrocrural lymph nodes, and R middle lobe nodule.   Follow-up with Hematology/Oncology after d/c planned for late this week     Atrial flutter ([address])  Assessment & Plan  IM consulted and with overall management at their discretion during ARC course who has now c/s'd Cards  holding MTP to avoid AV josh blocker to avoid harmony  Rate control:  None  Antithrombotic: None - consider NOAC when cleared by NSx      Benign hypertension with chronic kidney disease, stage IV [facility])  Assessment & Plan  Lab Results   Component Value Date    EGFR 24 09/28/2023    EGFR 29 09/25/2023    EGFR 29 09/20/2023    CREATININE 2.38 (H) 09/28/2023    CREATININE 2.05 (H) 09/25/2023    CREATININE 2.04 (H) 09/20/2023   Improved BP control  Cards and Internal medicine consulted and co-management with their service  Monitor vitals with and without activity; monitor for orthostasis  Monitor hemoglobin, electrolytes, kidney function, hydration status   Per IM  "Home regimen: losartan 25mg daily, metoprolol tartrate 25mg Q12, and torsemide 20mg EOD. Currently receiving amlodipine 5mg daily, torsemide 20mg EOD, and hydralazine 10mg TID. Metoprolol currently on hold due to prolonged QTc and bradycardia. Maintain SBP <160 due to recent intracranial bleed and >140 due to renal function. Continue to monitor BP with routine VS.  Follows with Dr. Montse Riggs (Cardiology) and Dr. Yovany Funk (Nephrology) as outpatient."    Snoring  Assessment & Plan  Per IM  "Wife has noted that patient snores at HS. Obtaining overnight noc ox on 9/22 to determine if patient has sleep apnea. Study showed desat for less than 2 minutes with desat as low as 81%. No need for O2 on discharge."    At risk for venous thromboembolism (VTE)  Assessment & Plan  SCDs, ambulation, and heparin       GERD (gastroesophageal reflux disease)  Assessment & Plan  PPI    Leukocytosis  Assessment & Plan  Chronically elevated as OP  Stable  No s/s of acute infection; pt afebrile   Completed course of unasyn per prior reccs      History of stroke  Assessment & Plan  Per IM  "[address] on 9/16 showed chronic appearing R caudate lacunar infarct. Currently on Lipitor 40mg daily. Would benefit from starting antiplatelet - would need to discuss with Neurosurgery after follow-up in 2 weeks. Continue with PT/OT."    Anemia due to stage 4 chronic kidney disease (HCC)  Assessment & Plan  Hb stable 9/28  OP Follow-up PCP and nephro    Stage 4 chronic kidney disease ([address])  Assessment & Plan  Cr 2.38 prior to d/c  Per IM Baseline creatinine 2.5-2.8. Monitor BUN/Cr intermittently as well as electrolytes   IM consulted to manage  Limit nephrotoxic agents when possible  Optimal BP mgmt   Per IM  "Currently being seen by Vascular for possible fistula creation as OP - on hold due to cancer treatment. Continue sodium bicarbonate 1300mg BID.   Follows with Nephrology as outpatient - Dr. Ming Barnett."      Anxiety and depression  Assessment & Plan  Celexa  Supportive counseling  Psychology consult while in [address] on and continue to encourage deep breathing/relaxation/behavioral management techniques      Ischemic cardiomyopathy  Assessment & Plan  IM consulted and with overall management at their discretion during ARC course (EF50-55%)  OP Cards     Coronary artery disease involving native coronary artery of native heart without angina pectoris  Assessment & Plan  IM consulted and with overall management at their discretion during ARC course  Antithrombotic: Currently none (was not on in OP setting) - consider after clearance with NSx   Statin  Optimal blood pressure and blood sugar control      Hyperlipidemia  Assessment & Plan  Statin     DMII (diabetes mellitus, type 2) [facility])  Assessment & Plan  Lab Results   Component Value Date    HGBA1C 6.3 06/05/2023       Recent Labs     09/20/23  2042 09/21/23  0654 09/21/23  1634 09/22/23  0626   POCGLU 146* 130 118 115       Blood Sugar Average: Last 72 hrs:  (P) [id number]     Internal medicine consulted and management at their discretion  Monitor for signs and symptoms of hypoglycemia   Current meds: None   consistent carb diabetic diet        Other Medical Issues:  • Monitor for     Follow-up providers and other issues to be followed up after discharge  PCP  Neurosurgery  ENT  Oncology/urology  Cardiology  Chronic providers    CODE: Level 1: Full Code    Restrictions include: Fall precautions    Objective:     Allergies per EMR  Diagnostic Studies: Reviewed, no new imaging     See above as well    Laboratory: Labs reviewed  Results from last 7 days   Lab Units 09/28/23  0540 09/25/23  0523   HEMOGLOBIN g/dL 10.3* 10.0*   HEMATOCRIT % 32.4* 31.7*   WBC Thousand/uL 12.13* 12.97*     Results from last 7 days   Lab Units 09/28/23  0540 09/25/23  0523   BUN mg/dL 32* 27*   POTASSIUM mmol/L 4.0 3.6   CHLORIDE mmol/L 103 102   CREATININE mg/dL 2.38* 2.05*              Drug regimen reviewed, all potential adverse effects identified and addressed:    Current Facility-Administered Medications   Medication Dose Route Frequency Provider Last Rate   • acetaminophen  650 mg Oral Q6H PRN Sushant Gastelum MD     • amLODIPine  5 mg Oral BID Sherrell Olszewski, CRNP     • ascorbic acid  500 mg Oral Daily Sherrell Olszewski, CRNP     • atorvastatin  40 mg Oral HS Sushant Gastelum MD     • bisacodyl  10 mg Rectal Daily PRN Sushant Gastelum MD     • calcium carbonate  500 mg Oral Daily PRN Sherrell Olszewski, CRNP     • citalopram  20 mg Oral Daily Sushant Gastelum MD     • ferrous gluconate  324 mg Oral Daily Before [address], [place]     • fluticasone  1 spray Each Nare Daily Sherrell Olszewski, CRNP     • folic acid  1 mg Oral Daily Sherrell Olszewski, CRNP     • heparin (porcine)  5,000 Units Subcutaneous [facility] Sushant Gastelum MD     • hydrALAZINE  10 mg Oral Q8H PRN Sherrell Olszewski, CRNP     • hydrALAZINE  10 mg Oral TID Sherrell Olszewski, CRNP     • loratadine  10 mg Oral HS Sherrell Olszewski, CRNP     • magnesium Oxide  400 mg Oral Daily Sherrell Olszewski, CRNP     • melatonin  6 mg Oral HS Sushant Gastelum MD     • oxyCODONE  2.5 mg Oral Q8H PRN Sushant Gastelum MD     • pantoprazole  40 mg Oral Early Morning Sushant Gastelum MD     • polyethylene glycol  17 g Oral Daily PRN Sushant Gastelum MD     • saccharomyces boulardii  250 mg Oral BID Sherrell Olszewski, CRNP     • senna  1 tablet Oral BID Sushant Gastelum MD     • sodium bicarbonate  1,300 mg Oral BID after meals Sushant Gastelum MD     • torsemide  20 mg Oral Every Other Day Sushant Gastelum MD     • traZODone  25 mg Oral HS Sushant Gastelum MD     • trimethobenzamide  200 mg Intramuscular Q6H PRN Sherrell Olszewski, CRNP         •  acetaminophen  •  bisacodyl  •  calcium carbonate  •  hydrALAZINE  •  oxyCODONE  •  polyethylene glycol  •  trimethobenzamide      Chief Complaints:  Rehab follow-up Subjective: On eval, patient reports again feeling well and ready for d/c tomorrow. He denies headache, visual changes, worsening hearing in R ear, nausea, worsening strength or other new complaints. ROS: A 10 point ROS was performed; negative except as noted above. Physical Exam:  09/28/23 1445 98.4 °F (36.9 °C) 65 18 144/68 94 % None (Room air) Lying               Vitals above reviewed on date of encounter    GEN:  Sitting in NAD   HEENT/NECK: Resolving ecchymosis, MMM  CARDIAC: Regular rate rhythm, no murmers, no rubs, no gallops  LUNGS:  clear to auscultation, no wheezes, rales, or rhonchi  ABDOMEN: Soft, non-tender, non-distended, normal active bowel sounds  EXTREMITIES/SKIN:  no calf edema, no calf tenderness to palpation and R forearm dressing in place  NEURO:   MENTAL STATUS: Adequate wakefulness and interaction, able to follow commands, CN II-XII: grossly intact  and Strength/MMT:  Near full throughout; FTN intact  PSYCH:  Affect:  More interactive/euthymic    HPI:  27-year-old male with a past medical history of 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 Patient was evaluated by neurosurgery recommended nonoperative management with seizure prophylaxis and repeat CT head in 2 weeks. Patient evaluated by ENT and also recommended nonoperative management. He did note some hearing loss and recommended follow-up with them which also should include an audiogram.  He was recommended Unasyn by trauma as well as Floxin drops in his right ear. Course notable for new onset A-flutter.   Full anticoagulation and antiplatelets have been on hold due to bleed. He may require Eliquis in the future. Patient evaluated by skilled therapies and noted have significant decline in ADLs and mobility and appears appropriate for admission to acute rehab at this time.       ** Please Note: Fluency Direct voice to text software may have been used in the creation of this document. **    I personally performed the required components and examined the patient myself in person on 9/28/23    55 minutes or greater spent for this encounter which included a combination of face-to-face time with Rosalva Ocampo and non-face-to-face time which in part specifically includes management of SDH. Face-to-face time included extended discussion with patient (+wife) in preparation for discharge regarding regarding current condition, medical/rehabilitation management, medications prescribed at discharge, functional limitations, safety, and follow-up/disposition. Non face-to-face time included coordination of care with patient's co-managing AP and/or physician(s) thru communication and review of their recent documentation as well as reviewing vitals, bowel/bladder function, recent labs, and notes from therapy, CM, and nursing. In addition, this patient was discussed by the interdisciplinary team in weekly case conference today. The care of the patient was extensively discussed with all care providers and an appropriate rehabilitation plan was formulated unique for this patient. Barriers were identified preventing progression of therapy and appropriate interventions were discussed with each discipline.  Please see the team note for input from all disciplines regarding barriers, intervention, and discharge planning

## 2023-09-29 NOTE — PROGRESS NOTES
Physical Medicine and Rehabilitation Progress Note  Kristan Loera 80 y.o. male MRN: [id number]  Unit/Bed#: [facility] 266-58 Encounter: [id number]      Assessment & Plan:     Decline in ADLs and mobility: Functional assessment - improving         FIM  Care Score  Admit Score Recent Score    Total assist  1-100% or 2p    Tot     Max assist 2-51-75%    Sub LBD    Mod assist 3- 26-50%  Par     Min assist 3- 25% or < Par To hygiene, bathing    CG assist 4  TA  To hygiene, LBD, bathing   Sup/Setup 4-5 Sup UBD UBD   Mod-I/Indep 6 MI      Transfers  Min-mod assist  CG assist     Ambulation   150 ft mod assist  150 ft min assist     Stairs   4 steps min assist  12 steps CGA     Goal: Supervision for most ADLs and for mobility  Major barriers:  Impaired cog, balance, risk of complications   Dispo: Home Friday with  PT, OT, ST, RN      SDH (subdural hematoma) ([address])  Assessment & Plan  9/27 - appears to be doing quite a bit better today; improved interaction, fatigue, and headache which is encouraging - continue to monitor closely   9/26 - slept better overnight, improving overall   9/25 - appears to be better today; but still difficulty sleeping - neuro exam stable; will trial adding trazodone 25mg HS   9/22 - clinically better appearing and functioning; BP stabilizing some    9/21 - BP elevated significantly at times, some increased fatigue, headache   - 9/21 - CTH - Interval increasing CSF density right subdural hygroma with increasing mass effect on the sulci. Trace midline shift to the left. Stable hyperdense hemorrhages seen in the right subarachnoid/subdural along the right frontal and right temporal regions. No new area of acute hemorrhage demonstrated. stable left frontal inferior contusions.  Stable ventricular size and stable volume of the hyperdense layering blood products in the occipital horns.    - Discussed with NSx with tele-c/s - stable to remain in ARC setting with plan to repeat CTH in 1 week 9/29 and at that time discuss embolization   -  monitor neuro exam closely and if decline repeat CTH in interim   - Optimal BP mgmt per IM  - Fall while carrying heavy object into truck causing head trauma and multicompartmental hemorrhage with small foci of subarachnoid hemorrhage in the bilateral inferior frontal region and additional small foci of subarachnoid hemorrhage as well as R significant temporal bone fracture and small scalp hematoma in right occipito-temporal region.  - Per NSx - CTA/CTV on hold due to low GFR and CKD 4. Consider in the outpatient setting if there is concern for sinus thrombosis given pneumocephalus within the sinus near the area of fracture. - Residual impairments: Imbalance, incoordination, cog deficits (assess for other impairments during ARC course)   - Cleared for heparin 5000 U SQ TID for VTE prophylaxis   - Repeat Head CT Fri 9/29 and follow-up with NSx after  - Keppra seizure prophylaxis thru 9/21 completed     - Current/recent mood/affect/behavior/cog - less flattened, more interactive   - Remains at risk for increased confusion/delirium, restlessness, agitation, and fall - continue to monitor for concerns/changes  - Current psychotropics and potentially sedating medications:   • Citalopram  • (Keppra stopped 9/21)   • Melatonin 6mg HS  • Trazodone 25mg HS starting 9/25 HS  • PRN Oxy 2.5mg > not needing   - Monitor for need for 1:1 (Notify MD of potentially dangerous behavior such as significant impulsivity and trying to stand/get out of bed/chair unattended that could lead to fall/injury);  High fall precautions with frequent rounding, patient close to nursing station if possible, frequent toileting/frequent offering urinal/bedpan (only if too immobile for safe toileting);  bed/chair alarm on at all times (high setting if needed); fall lj on side of bed (at discretion of nursing/therapy); do not leave unattended in bathroom, patient education; call bell availability; Sleep/agitation log, frequent redirection, reorientation, reassurance; Family access to patient if helpful  - No recent reports of dangerous/risky behavior requiring 1:1 at this point but monitor closely  - Continue acute comprehensive interdisciplinary inpatient rehabilitation to include intensive skilled therapies (PT, OT, ST) with oversight and management by rehabilitation physician. Inpatient rehab level nursing, case management and weekly interdisciplinary team meetings. Provide patient and (if available) caregiver/family teaching regarding brain injury/residual disability management. - For routine restlessness, anxiety, irritability focus on non-pharmacologic management    - Obtain MOCA when cog status stabilized   - Please assess iADLs - ability to manage medications, meal prep, finances, obtaining appropriate transport from community, managing emergencies, and overall safety in home environment. - Provide therapy, compensatory strategies, and if available and necessary provide caregiver training. Notify MD of impairments or inability to perform iADLs adequately.    - Monitor neuro-exam, wakefulness, mood, cognition, insight into deficits and safety awareness   - Monitor and ensure optimal management electrolytes, nutrition, and hydration  - Monitor for signs or symptoms of infection, medication intolerances, other systemic etiologies  - Additional labs, imaging, specialist follow-up as needed   - Patient/family/caregiver education and training   - Overstimulation precautions, frequent re-orientation, re-direction, re-assurance  - Optimal mood, pain, and sleep management  - Sleep log and agitation monitoring    - Limit sedating medications when possible  - Follow-up with NeuroSx after discharge and if needed during ARC course as well as PCP      * SAH (subarachnoid hemorrhage) ([address])  Assessment & Plan  See SDH     Fracture of temporal bone ([address])  Assessment & Plan  - Acute comminuted otic sparing right temporal bone fracture involving the squamosal, mastoid, and tympanic portions with diastases of occipitotemporal suture and extension of fracture into visualized right occipital calvarium. Recommend follow-up CTV   head with contrast to assess underlying patency of right dural venous sinuses. - Probable small-to-moderate RIGHT-sided bloody mastoid effusion, large bloody middle ear effusion, and probable debris and blood products throughout external auditory canal.  - Small scalp hematoma in right occipito-temporal region.     LEFT TEMPORAL BONE CT  -Trace left mastoid effusion. Otherwise, normal LEFT temporal bone CT.     Per ENT  "right otic sparing temporal bone fracture   -Facial nerve intact, no need for surgical decompression  -Hearing loss in right ear- tuning fork exam consistent with conductive pattern secondary to debris in canal and middle ear-Recommend outpatient follow-up with audiogram next week or the following week after discharge  -Floxin drops twice daily 5 drops to R ear for 10 days due to debris in canal  -Please do not hesitate to reach out with any questions or concerns"  - OK to use Floxin drops in ear - will need full 10 day course starting now (was getting in eye) thru 9/28  - Unasyn for 1 week course completed 9/22  - Did not undergo CTV/CTA due to concern for kidney function and now NSx not currently recommending it         History of bladder cancer  Assessment & Plan  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  - Monitor site and for s/s of infection  - Receiving Avelunab every 14 days and Aranesp - on hold with acute medical decline   - Imaging of C/A/P concerning for mets - per discharging provider "soft tissue mass, hepatic lesions, peritoneal stippling consistent with metastatic bladder cancer new from last CAT scan in August 2023"  - Follow-up with H-O - patient expected to be functionally ready for d/c Friday after repeat [facility] and can follow-up with H-O following week     Skin tear of right forearm without complication  Assessment & Plan  Wound care c/s    "Wound to right arm is partial thickness with beefy red fragile tissue with scant sanguineous drainage. Periwound skin is fragile.    Plan:   Clean with NSS, pad dry completely, apply xeroform only over wound area. Cover with DSD and Kreg Muskrat; Change Daily."      Insomnia  Assessment & Plan  Improving Melatonin 6mg HS  Continue trazodone 25mg HS  Sleep log     Abnormal findings on imaging test  Assessment & Plan  CTA chest/abd/pelvis on 9/15 with multiple concerning lymph nodes/lesions for metastasis including: nodule at the L obturator internus muscle, L sided mesenteric lymph nodes, R external iliac lymph node, R sided retroperitoneal lymph nodes, hypodense structure along with R pelvic sidewall, lesions in the R inferior hepatic lobe, peritoneal soft tissue nodularity, R cardiophrenic lymph node, 2 enlarged retrocrural lymph nodes, and R middle lobe nodule. Follow-up with Hematology/Oncology after d/c planned for late this week     Atrial flutter ([address])  Assessment & Plan  IM consulted and with overall management at their discretion during ARC course who has now c/s'd Cards  holding MTP to avoid AV josh blocker to avoid harmony  Rate control:  None  Antithrombotic: None - consider NOAC when cleared by NSx      Benign hypertension with chronic kidney disease, stage IV [facility])  Assessment & Plan  Lab Results   Component Value Date    EGFR 29 09/20/2023    EGFR 25 09/19/2023    EGFR 26 09/18/2023    CREATININE 2.04 (H) 09/20/2023    CREATININE 2.29 (H) 09/19/2023    CREATININE 2.23 (H) 09/18/2023   Improved BP control  Cards and Internal medicine consulted and co-management with their service  Monitor vitals with and without activity; monitor for orthostasis  Monitor hemoglobin, electrolytes, kidney function, hydration status   Per IM  "Home regimen: losartan 25mg daily, metoprolol tartrate 25mg Q12, and torsemide 20mg EOD.   Currently receiving amlodipine 5mg daily, torsemide 20mg EOD, and hydralazine 10mg TID. Metoprolol currently on hold due to prolonged QTc and bradycardia."    Snoring  Assessment & Plan  Per IM  "Wife has noted that patient snores at HS. Obtaining overnight noc ox on 9/22 to determine if patient has sleep apnea. Study showed desat for less than 2 minutes with desat as low as 81%. No need for O2 on discharge."    At risk for venous thromboembolism (VTE)  Assessment & Plan  SCDs, ambulation, and heparin       GERD (gastroesophageal reflux disease)  Assessment & Plan  PPI    Leukocytosis  Assessment & Plan  Chronically elevated as OP  Stable  No s/s of acute infection; pt afebrile   Completed course of unasyn per prior reccs      History of stroke  Assessment & Plan  Per IM  "[address] on 9/16 showed chronic appearing R caudate lacunar infarct. Currently on Lipitor 40mg daily. Would benefit from starting antiplatelet - would need to discuss with Neurosurgery after follow-up in 2 weeks. Continue with PT/OT."    Anemia due to stage 4 chronic kidney disease ([address])  Assessment & Plan  Hb stable 9/25  Consult(ed) IM and comanagement with their service during ARC course   Monitor H/H, vitals, signs/symptoms of acute bleeding      Stage 4 chronic kidney disease [facility])  Assessment & Plan  Lab Results   Component Value Date    EGFR 29 09/20/2023    EGFR 25 09/19/2023    EGFR 26 09/18/2023    CREATININE 2.29 (H) 09/19/2023    CREATININE 2.23 (H) 09/18/2023   Cr stable at 2.05 on 9/25   Per IM Baseline creatinine 2.5-2.8. Monitor BUN/Cr intermittently as well as electrolytes   IM consulted to manage  Limit nephrotoxic agents when possible  Optimal BP mgmt   Per IM  "Currently being seen by Vascular for possible fistula creation as OP - on hold due to cancer treatment. Continue sodium bicarbonate 1300mg BID.   Follows with Nephrology as outpatient - Dr. Yadira Mccabe."      Anxiety and depression  Assessment & Plan  Celexa  Supportive counseling  Psychology consult while in [address] on and continue to encourage deep breathing/relaxation/behavioral management techniques      Ischemic cardiomyopathy  Assessment & Plan  IM consulted and with overall management at their discretion during ARC course (EF50-55%)  OP Cards     Coronary artery disease involving native coronary artery of native heart without angina pectoris  Assessment & Plan  IM consulted and with overall management at their discretion during ARC course  Antithrombotic: Currently none (was not on in OP setting) - consider after clearance with NSx   Statin  Optimal blood pressure and blood sugar control      Hyperlipidemia  Assessment & Plan  Statin     DMII (diabetes mellitus, type 2) ([address])  Assessment & Plan  Lab Results   Component Value Date    HGBA1C 6.3 06/05/2023       Recent Labs     09/20/23  2042 09/21/23  0654 09/21/23  1634 09/22/23  0626   POCGLU 146* 130 118 115       Blood Sugar Average: Last 72 hrs:  (P) [id number]     Internal medicine consulted and management at their discretion  Monitor for signs and symptoms of hypoglycemia   Current meds: None   consistent carb diabetic diet        Other Medical Issues:  • Monitor for     Follow-up providers and other issues to be followed up after discharge  PCP  Neurosurgery  ENT  Oncology/urology  Cardiology  Chronic providers    CODE: Level 1: Full Code    Restrictions include: Fall precautions    Objective:     Allergies per EMR  Diagnostic Studies: Reviewed, no new imaging     See above as well    Laboratory: Labs reviewed  Results from last 7 days   Lab Units 09/25/23  0523   HEMOGLOBIN g/dL 10.0*   HEMATOCRIT % 31.7*   WBC Thousand/uL 12.97*     Results from last 7 days   Lab Units 09/25/23  0523   BUN mg/dL 27*   SODIUM mmol/L 135   POTASSIUM mmol/L 3.6   CHLORIDE mmol/L 102   CREATININE mg/dL 2.05*            Drug regimen reviewed, all potential adverse effects identified and addressed:    Current Facility-Administered Medications   Medication Dose Route Frequency Provider Last Rate   • acetaminophen  650 mg Oral Q6H PRN Shannon Reyes MD     • amLODIPine  5 mg Oral BID Raleigh Rides, CRNP     • ascorbic acid  500 mg Oral Daily Raleigh Rides, CRNP     • atorvastatin  40 mg Oral HS Shannon Reyes MD     • bisacodyl  10 mg Rectal Daily PRN Shannon Reyes MD     • calcium carbonate  500 mg Oral Daily PRN Raleigh Rides, CRNP     • citalopram  20 mg Oral Daily Shannon Reyes MD     • ferrous gluconate  324 mg Oral Daily Before Breakfast Raleigh Rides, CRNP     • fluticasone  1 spray Each Nare Daily Raleigh Rides, CRNP     • folic acid  1 mg Oral Daily Raleigh Rides, CRNP     • heparin (porcine)  5,000 Units Subcutaneous [facility] Shannon Reyes MD     • hydrALAZINE  10 mg Oral Q8H PRN Raleigh Rides, CRNP     • hydrALAZINE  10 mg Oral TID Raleigh Rides, CRNP     • [START ON 9/26/2023] loratadine  10 mg Oral HS Raleigh Rides, CRNP     • magnesium Oxide  400 mg Oral Daily Raleigh Rides, CRNP     • melatonin  6 mg Oral HS Shannon Reyes MD     • ofloxacin  5 drop Otic Daily Shannon Reyes MD     • oxyCODONE  2.5 mg Oral Q8H PRN Shannon Reyes MD     • pantoprazole  40 mg Oral Early Morning Shannon Reyes MD     • polyethylene glycol  17 g Oral Daily PRN Shannon Reyes MD     • polyethylene glycol  17 g Oral Daily Shannon Reyes MD     • saccharomyces boulardii  250 mg Oral BID Raleigh Ridhugh, CRNP     • senna  1 tablet Oral BID Shannon Reyes MD     • sodium bicarbonate  1,300 mg Oral BID after meals Shannon Reyes MD     • torsemide  20 mg Oral Every Other Day Shannon Reyes MD     • traZODone  25 mg Oral HS Shannon Reyes MD     • trimethobenzamide  200 mg Intramuscular Q6H PRN Raleigh Stephens, SHANNONNP            Chief Complaints:  Rehab follow-up     Subjective: On eval, patient reports feeling well today.   He denies dizziness, nausea, strength changes or other new complaints. ROS: A 10 point ROS was performed; negative except as noted above. Physical Exam:  09/27/23 0614 97.6 °F (36.4 °C) 62 18 145/68 96 % None (Room air) Lying       Vitals above reviewed on date of encounter    GEN:  Sitting in NAD   HEENT/NECK: Resolving ecchymosis, MMM  CARDIAC: Regular rate rhythm, no murmers, no rubs, no gallops  LUNGS:  clear to auscultation, no wheezes, rales, or rhonchi  ABDOMEN: Soft, non-tender, non-distended, normal active bowel sounds  EXTREMITIES/SKIN:  no calf edema, no calf tenderness to palpation and R forearm dressing in place  NEURO:   MENTAL STATUS: Adequate wakefulness and interaction, able to follow commands and Strength/MMT:  Near full throughout  [facility]:  Affect:  Less flattened    HPI:  77-year-old male with a past medical history of 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 Patient was evaluated by neurosurgery recommended nonoperative management with seizure prophylaxis and repeat CT head in 2 weeks. Patient evaluated by ENT and also recommended nonoperative management. He did note some hearing loss and recommended follow-up with them which also should include an audiogram.  He was recommended Unasyn by trauma as well as Floxin drops in his right ear. Course notable for new onset A-flutter. Full anticoagulation and antiplatelets have been on hold due to bleed. He may require Eliquis in the future.   Patient evaluated by skilled therapies and noted have significant decline in ADLs and mobility and appears appropriate for admission to acute rehab at this time.       ** Please Note: Fluency Direct voice to text software may have been used in the creation of this document.  **    I personally performed the required components and examined the patient myself in person on 9/27/23

## 2023-09-29 NOTE — ASSESSMENT & PLAN NOTE
Lab Results   Component Value Date    EGFR 24 09/28/2023    EGFR 29 09/25/2023    EGFR 29 09/20/2023    CREATININE 2.38 (H) 09/28/2023    CREATININE 2.05 (H) 09/25/2023    CREATININE 2.04 (H) 09/20/2023     Creatinine currently 2.38. Baseline creatinine 2.5-2.8. Avoid nephrotoxins as able - losartan currently on hold. Receiving torsemide 20mg EOD. Ensure adequate hydration. Currently being seen by Vascular for possible fistula creation as OP - on hold due to cancer treatment. Continue sodium bicarbonate 1300mg BID.   Follows with Nephrology as outpatient - Dr. Karoline Johnson.

## 2023-09-29 NOTE — PROGRESS NOTES
Physical Medicine and Rehabilitation Progress Note  Riky Elias 80 y.o. male MRN: [id number]  Unit/Bed#: [facility] 200-64 Encounter: [id number]      Assessment & Plan:     Decline in ADLs and mobility: Functional assessment - improving         FIM  Care Score  Admit Score Recent Score    Total assist  1-100% or 2p    Tot     Max assist 2-51-75%    Sub LBD    Mod assist 3- 26-50%  Par     Min assist 3- 25% or < Par To hygiene, bathing    CG assist 4  TA  To hygiene, LBD, bathing   Sup/Setup 4-5 Sup UBD UBD   Mod-I/Indep 6 MI      Transfers  Min-mod assist  CG assist     Ambulation   150 ft mod assist  150 ft min assist     Stairs   4 steps min assist  12 steps CGA     Goal: Supervision for most ADLs and for mobility  Major barriers:  Impaired cog, balance, risk of complications   Dispo: Home Friday with  PT, OT, ST, RN      SDH (subdural hematoma) ([address])  Assessment & Plan  9/26 - slept better overnight, improving overall   9/25 - appears to be better today; but still difficulty sleeping - neuro exam stable; will trial adding trazodone 25mg HS   9/22 - clinically better appearing and functioning; BP stabilizing some    9/21 - BP elevated significantly at times, some increased fatigue, headache   - 9/21 - CTH - Interval increasing CSF density right subdural hygroma with increasing mass effect on the sulci. Trace midline shift to the left. Stable hyperdense hemorrhages seen in the right subarachnoid/subdural along the right frontal and right temporal regions. No new area of acute hemorrhage demonstrated. stable left frontal inferior contusions.  Stable ventricular size and stable volume of the hyperdense layering blood products in the occipital horns.    - Discussed with NSx with tele-c/s - stable to remain in ARC setting with plan to repeat CTH in 1 week 9/29 and at that time discuss embolization   -  monitor neuro exam closely and if decline repeat CTH in interim   - Optimal BP mgmt per IM  - Fall while carrying heavy object into truck causing head trauma and multicompartmental hemorrhage with small foci of subarachnoid hemorrhage in the bilateral inferior frontal region and additional small foci of subarachnoid hemorrhage as well as R significant temporal bone fracture and small scalp hematoma in right occipito-temporal region.  - Per NSx - CTA/CTV on hold due to low GFR and CKD 4. Consider in the outpatient setting if there is concern for sinus thrombosis given pneumocephalus within the sinus near the area of fracture. - Residual impairments: Imbalance, incoordination, cog deficits (assess for other impairments during ARC course)   - Cleared for heparin 5000 U SQ TID for VTE prophylaxis   - Repeat Head CT Fri 9/29 and follow-up with NSx after  - Keppra seizure prophylaxis thru 9/21 completed     - Current/recent mood/affect/behavior/cog - less flattened, more interactive   - Remains at risk for increased confusion/delirium, restlessness, agitation, and fall - continue to monitor for concerns/changes  - Current psychotropics and potentially sedating medications:   • Citalopram  • (Keppra stopped 9/21)   • Melatonin 6mg HS  • Trazodone 25mg HS starting 9/25 HS  • PRN Oxy 2.5mg > not needing   - Monitor for need for 1:1 (Notify MD of potentially dangerous behavior such as significant impulsivity and trying to stand/get out of bed/chair unattended that could lead to fall/injury); High fall precautions with frequent rounding, patient close to nursing station if possible, frequent toileting/frequent offering urinal/bedpan (only if too immobile for safe toileting);  bed/chair alarm on at all times (high setting if needed); fall lj on side of bed (at discretion of nursing/therapy); do not leave unattended in bathroom, patient education; call bell availability; Sleep/agitation log, frequent redirection, reorientation, reassurance;  Family access to patient if helpful  - No recent reports of dangerous/risky behavior requiring 1:1 at this point but monitor closely  - Continue acute comprehensive interdisciplinary inpatient rehabilitation to include intensive skilled therapies (PT, OT, ST) with oversight and management by rehabilitation physician. Inpatient rehab level nursing, case management and weekly interdisciplinary team meetings. Provide patient and (if available) caregiver/family teaching regarding brain injury/residual disability management. - For routine restlessness, anxiety, irritability focus on non-pharmacologic management    - Obtain MOCA when cog status stabilized   - Please assess iADLs - ability to manage medications, meal prep, finances, obtaining appropriate transport from community, managing emergencies, and overall safety in home environment. - Provide therapy, compensatory strategies, and if available and necessary provide caregiver training. Notify MD of impairments or inability to perform iADLs adequately.    - Monitor neuro-exam, wakefulness, mood, cognition, insight into deficits and safety awareness   - Monitor and ensure optimal management electrolytes, nutrition, and hydration  - Monitor for signs or symptoms of infection, medication intolerances, other systemic etiologies  - Additional labs, imaging, specialist follow-up as needed   - Patient/family/caregiver education and training   - Overstimulation precautions, frequent re-orientation, re-direction, re-assurance  - Optimal mood, pain, and sleep management  - Sleep log and agitation monitoring    - Limit sedating medications when possible  - Follow-up with NeuroSx after discharge and if needed during ARC course as well as PCP      * SAH (subarachnoid hemorrhage) ([address])  Assessment & Plan  See SDH     Fracture of temporal bone ([address])  Assessment & Plan  - Acute comminuted otic sparing right temporal bone fracture involving the squamosal, mastoid, and tympanic portions with diastases of occipitotemporal suture and extension of fracture into visualized right occipital calvarium. Recommend follow-up CTV   head with contrast to assess underlying patency of right dural venous sinuses. - Probable small-to-moderate RIGHT-sided bloody mastoid effusion, large bloody middle ear effusion, and probable debris and blood products throughout external auditory canal.  - Small scalp hematoma in right occipito-temporal region.     LEFT TEMPORAL BONE CT  -Trace left mastoid effusion. Otherwise, normal LEFT temporal bone CT.     Per ENT  "right otic sparing temporal bone fracture   -Facial nerve intact, no need for surgical decompression  -Hearing loss in right ear- tuning fork exam consistent with conductive pattern secondary to debris in canal and middle ear-Recommend outpatient follow-up with audiogram next week or the following week after discharge  -Floxin drops twice daily 5 drops to R ear for 10 days due to debris in canal  -Please do not hesitate to reach out with any questions or concerns"  - OK to use Floxin drops in ear - will need full 10 day course starting now (was getting in eye) thru 9/28  - Unasyn for 1 week course completed 9/22  - Did not undergo CTV/CTA due to concern for kidney function and now NSx not currently recommending it         History of bladder cancer  Assessment & Plan  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  - Monitor site and for s/s of infection  - Receiving Avelunab every 14 days and Aranesp - on hold with acute medical decline   - Imaging of C/A/P concerning for mets - per discharging provider "soft tissue mass, hepatic lesions, peritoneal stippling consistent with metastatic bladder cancer new from last CAT scan in August 2023"  - Follow-up with H-O - patient expected to be functionally ready for d/c Friday after repeat [address] and can follow-up with H-O following week     Skin tear of right forearm without complication  Assessment & Plan  Wound care c/s    "Wound to right arm is partial thickness with beefy red fragile tissue with scant sanguineous drainage. Periwound skin is fragile.    Plan:   Clean with NSS, pad dry completely, apply xeroform only over wound area. Cover with DSD and Lillette Heady; Change Daily."      Insomnia  Assessment & Plan  Improving Melatonin 6mg HS  Continue trazodone 25mg HS  Sleep log     Abnormal findings on imaging test  Assessment & Plan  CTA chest/abd/pelvis on 9/15 with multiple concerning lymph nodes/lesions for metastasis including: nodule at the L obturator internus muscle, L sided mesenteric lymph nodes, R external iliac lymph node, R sided retroperitoneal lymph nodes, hypodense structure along with R pelvic sidewall, lesions in the R inferior hepatic lobe, peritoneal soft tissue nodularity, R cardiophrenic lymph node, 2 enlarged retrocrural lymph nodes, and R middle lobe nodule. Follow-up with Hematology/Oncology after d/c planned for late this week     Atrial flutter ([address])  Assessment & Plan  IM consulted and with overall management at their discretion during ARC course who has now c/s'd Cards  holding MTP to avoid AV josh blocker to avoid harmony  Rate control:  None  Antithrombotic: None - consider NOAC when cleared by NSx      Benign hypertension with chronic kidney disease, stage IV [facility])  Assessment & Plan  Lab Results   Component Value Date    EGFR 29 09/20/2023    EGFR 25 09/19/2023    EGFR 26 09/18/2023    CREATININE 2.04 (H) 09/20/2023    CREATININE 2.29 (H) 09/19/2023    CREATININE 2.23 (H) 09/18/2023   Improved BP control  Cards and Internal medicine consulted and co-management with their service  Monitor vitals with and without activity; monitor for orthostasis  Monitor hemoglobin, electrolytes, kidney function, hydration status   Per IM  "Home regimen: losartan 25mg daily, metoprolol tartrate 25mg Q12, and torsemide 20mg EOD. Currently receiving amlodipine 5mg daily, torsemide 20mg EOD, and hydralazine 10mg TID.   Metoprolol currently on hold due to prolonged QTc and bradycardia."    Snoring  Assessment & Plan  Per IM  "Wife has noted that patient snores at HS. Obtaining overnight noc ox on 9/22 to determine if patient has sleep apnea. Study showed desat for less than 2 minutes with desat as low as 81%. No need for O2 on discharge."    At risk for venous thromboembolism (VTE)  Assessment & Plan  SCDs, ambulation, and heparin       GERD (gastroesophageal reflux disease)  Assessment & Plan  PPI    Leukocytosis  Assessment & Plan  Chronically elevated as OP  Stable  No s/s of acute infection; pt afebrile   Completed course of unasyn per prior reccs      History of stroke  Assessment & Plan  Per IM  "[address] on 9/16 showed chronic appearing R caudate lacunar infarct. Currently on Lipitor 40mg daily. Would benefit from starting antiplatelet - would need to discuss with Neurosurgery after follow-up in 2 weeks. Continue with PT/OT."    Anemia due to stage 4 chronic kidney disease ([address])  Assessment & Plan  Hb stable 9/25  Consult(ed) IM and comanagement with their service during ARC course   Monitor H/H, vitals, signs/symptoms of acute bleeding      Stage 4 chronic kidney disease [facility])  Assessment & Plan  Lab Results   Component Value Date    EGFR 29 09/20/2023    EGFR 25 09/19/2023    EGFR 26 09/18/2023    CREATININE 2.29 (H) 09/19/2023    CREATININE 2.23 (H) 09/18/2023   Cr stable at 2.05 on 9/25   Per IM Baseline creatinine 2.5-2.8. Monitor BUN/Cr intermittently as well as electrolytes   IM consulted to manage  Limit nephrotoxic agents when possible  Optimal BP mgmt   Per IM  "Currently being seen by Vascular for possible fistula creation as OP - on hold due to cancer treatment. Continue sodium bicarbonate 1300mg BID.   Follows with Nephrology as outpatient - Dr. Lula White."      Anxiety and depression  Assessment & Plan  Celexa  Supportive counseling  Psychology consult while in [facility] on and continue to encourage deep breathing/relaxation/behavioral management techniques      Ischemic cardiomyopathy  Assessment & Plan  IM consulted and with overall management at their discretion during ARC course (EF50-55%)  OP Cards     Coronary artery disease involving native coronary artery of native heart without angina pectoris  Assessment & Plan  IM consulted and with overall management at their discretion during ARC course  Antithrombotic: Currently none (was not on in OP setting) - consider after clearance with NSx   Statin  Optimal blood pressure and blood sugar control      Hyperlipidemia  Assessment & Plan  Statin     DMII (diabetes mellitus, type 2) ([address])  Assessment & Plan  Lab Results   Component Value Date    HGBA1C 6.3 06/05/2023       Recent Labs     09/20/23  2042 09/21/23  0654 09/21/23  1634 09/22/23  0626   POCGLU 146* 130 118 115       Blood Sugar Average: Last 72 hrs:  (P) [id number]     Internal medicine consulted and management at their discretion  Monitor for signs and symptoms of hypoglycemia   Current meds: None   consistent carb diabetic diet        Other Medical Issues:  • Monitor for     Follow-up providers and other issues to be followed up after discharge  PCP  Neurosurgery  ENT  Oncology/urology  Cardiology  Chronic providers    CODE: Level 1: Full Code    Restrictions include: Fall precautions    Objective:     Allergies per EMR  Diagnostic Studies: Reviewed, no new imaging     See above as well    Laboratory: Labs reviewed  Results from last 7 days   Lab Units 09/25/23  0523   HEMOGLOBIN g/dL 10.0*   HEMATOCRIT % 31.7*   WBC Thousand/uL 12.97*     Results from last 7 days   Lab Units 09/25/23  0523   BUN mg/dL 27*   SODIUM mmol/L 135   POTASSIUM mmol/L 3.6   CHLORIDE mmol/L 102   CREATININE mg/dL 2.05*            Drug regimen reviewed, all potential adverse effects identified and addressed:    Current Facility-Administered Medications   Medication Dose Route Frequency Provider Last Rate   • acetaminophen  650 mg Oral Q6H PRN Zina Mikel Mando North MD     • amLODIPine  5 mg Oral BID Leandrew Grief, CRNP     • ascorbic acid  500 mg Oral Daily Leandrew Grief, CRNP     • atorvastatin  40 mg Oral HS Laurel Woodruff MD     • bisacodyl  10 mg Rectal Daily PRN Laurel Woodruff MD     • calcium carbonate  500 mg Oral Daily PRN Leandrew Grief, CRNP     • citalopram  20 mg Oral Daily Laurel Woodruff MD     • ferrous gluconate  324 mg Oral Daily Before Breakfast Leandrew Grief, CRNP     • fluticasone  1 spray Each Nare Daily Leandrew Grief, CRNP     • folic acid  1 mg Oral Daily Leandrew Grief, CRNP     • heparin (porcine)  5,000 Units Subcutaneous [facility] Laurel Woodruff MD     • hydrALAZINE  10 mg Oral Q8H PRN Leandrew Grief, CRNP     • hydrALAZINE  10 mg Oral TID Leandrew Grief, CRNP     • [START ON 9/26/2023] loratadine  10 mg Oral HS Leandrew Grief, CRNP     • magnesium Oxide  400 mg Oral Daily Leandrew Grief, CRNP     • melatonin  6 mg Oral HS Laurel Woodruff MD     • ofloxacin  5 drop Otic Daily Laurel Woodruff MD     • oxyCODONE  2.5 mg Oral Q8H PRN Laurel Woodruff MD     • pantoprazole  40 mg Oral Early Morning Laurel Woodruff MD     • polyethylene glycol  17 g Oral Daily PRN Laurel Woodruff MD     • polyethylene glycol  17 g Oral Daily Laurel Woodruff MD     • saccharomyces boulardii  250 mg Oral BID Leandrew Grief, CRNP     • senna  1 tablet Oral BID Laurel Woodruff MD     • sodium bicarbonate  1,300 mg Oral BID after meals Laurel Woodruff MD     • torsemide  20 mg Oral Every Other Day Laurel Woodruff MD     • traZODone  25 mg Oral HS Laurel Woodruff MD     • trimethobenzamide  200 mg Intramuscular Q6H PRN Leandrew Grief, CRNP            Chief Complaints:  Rehab follow-up     Subjective: On eval, patient reports sleeping better overnight. He reports overall feeling better as well and denies headache, nausea, dizziness, SOB, fever, chills, increased cough, or other new complaints.       ROS: A 10 point ROS was performed; negative except as noted above. Physical Exam:  Temp:  [98.7 °F (37.1 °C)-99.2 °F (37.3 °C)] 98.7 °F (37.1 °C)  HR:  [66-73] 73  Resp:  [18-20] 18  BP: (130-152)/(64-80) 150/70  SpO2:  [96 %-97 %] 96 %  Vitals above reviewed on date of encounter    GEN:  Sitting in NAD   HEENT/NECK: Improving facial ecchymosis, MMM  CARDIAC: Regular rate rhythm, no murmers, no rubs, no gallops  LUNGS:  clear to auscultation, no wheezes, rales, or rhonchi  ABDOMEN: Soft, non-tender, non-distended, normal active bowel sounds  EXTREMITIES/SKIN:  no calf edema, no calf tenderness to palpation and F forearm skin tear without signs of infection  NEURO:   MENTAL STATUS: Adequate wakefulness and interaction, able to follow commands and Strength/MMT:  Near full throughout  [facility]:  Affect:  Less flattened    HPI:  27-year-old male with a past medical history of 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 Patient was evaluated by neurosurgery recommended nonoperative management with seizure prophylaxis and repeat CT head in 2 weeks. Patient evaluated by ENT and also recommended nonoperative management. He did note some hearing loss and recommended follow-up with them which also should include an audiogram.  He was recommended Unasyn by trauma as well as Floxin drops in his right ear. Course notable for new onset A-flutter. Full anticoagulation and antiplatelets have been on hold due to bleed. He may require Eliquis in the future.   Patient evaluated by skilled therapies and noted have significant decline in ADLs and mobility and appears appropriate for admission to acute rehab at this time.       ** Please Note: Fluency Direct voice to text software may have been used in the creation of this document.  **    I personally performed the required components and examined the patient myself in person on 9/26/23

## 2023-09-29 NOTE — ASSESSMENT & PLAN NOTE
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 Underwent radical cystoprostatectomy with ileal conduit at [facility]. Recurrence in 2022 and was started chemotherapy. Currently receiving Avelunab and aranesp for 3 months. Follows with Dr. Capri Longo (Hem/Onc) as outpatient - due for appt on 9/26, will need to reschedule. Ordered PET scan for 9/19 but currently inpatient. CT chest/abd/pelvis in acute setting showed multiple concerns for metastasis. Follow-up with Hem/Onc as outpatient.

## 2023-09-30 NOTE — PROGRESS NOTES
09/29/23 1900   Pain Assessment   Pain Assessment Tool 0-10   Pain Score No Pain   Restrictions/Precautions   Precautions Fall Risk; Goins; Visual deficit   Braces or Orthoses   (TEDS abd binder)   Lower Body Dressing   Type of Assistance Needed Supervision   Physical Assistance Level No physical assistance   Comment Pt completed LB dressing using lower chair. Was CS for task   Lower Body Dressing CARE Score 4   Putting On/Taking Off Footwear   Type of Assistance Needed Set-up / clean-up   Physical Assistance Level No physical assistance   Comment Pt seated on low chair with step stool to don/doff socks and shows. Pt able to complete with increased time. Did loosen abdominal binder to allow pt to bend more comfortably while seated. Re applied ABD binder prior to standing. Putting On/Taking Off Footwear CARE Score 5   Sit to Stand   Type of Assistance Needed Supervision   Physical Assistance Level No physical assistance   Comment supervision for sit to stand transfers with RW. able to rise from standard surfaces and also low chair   Sit to Stand CARE Score 4   Bed-Chair Transfer   Type of Assistance Needed Supervision   Physical Assistance Level No physical assistance   Comment   (with RW)   Chair/Bed-to-Chair Transfer CARE Score 4   Toileting Hygiene   Type of Assistance Needed Supervision   Physical Assistance Level No physical assistance   Toileting Hygiene CARE Score 4   Toilet Transfer   Type of Assistance Needed Supervision   Physical Assistance Level No physical assistance   Toilet Transfer CARE Score 4   Cognition   Arousal/Participation Alert; Cooperative   Attention Attends with cues to redirect   Orientation Level Oriented X4   Memory Decreased short term memory   Following Commands Follows multistep commands without difficulty   Comments Pt engaged in blind moca version 8.1 scoring a 18/22 which is within normal range.  Pt did demonstrate defiicts in delayed recall and lost one point in abstract thinking and attention. See below for detailed report   Vision   Vision Comments severe macular degeneration   Activity Tolerance   Activity Tolerance Patient tolerated treatment well   Assessment   Treatment Assessment Pt seen for 60 minute OT session with focus on blind MOCA assessment, LB dresisng, fxnl mobility, dynamic standing balance, and fxnl endurance. Pt able to ambulate around room at supervision level and retrieve items from counter and in drawers in order to pack bags for d/c. Pt with good endurance and tolerance for standing. Pt noted with improvement in LB dressing today and is CS level overall with increased time to complete. Pt is safe to d/c home today with wife supervision. Pt continues with some STM deficits but improving cognition noted. Pt set for d/c home today seen for 60 minutes due to wife picking wife up for d/c.   Plan   Progress Improving as expected   Recommendation   Equipment Recommended   (pt declined bedside commode. Returned to [employer] from [employer] noted)   OT Therapy Minutes   OT Time In 1300   OT Time Out 1400   OT Total Time (minutes) 60   OT Mode of treatment - Individual (minutes) 60   OT Mode of treatment - Concurrent (minutes) 0   OT Mode of treatment - Group (minutes) 0   OT Mode of treatment - Co-treat (minutes) 0   OT Mode of Treatment - Total time(minutes) 60 minutes   OT Cumulative Minutes 885   Therapy Time missed   Time missed? No     Pt completed [place] Cognitive Assessment (MOCA)  Blind version 8.1. Pt scored overall 18/22 indicating normal cognitive range. Pt given blind moca due to severe macular degeneration. Pt scored within normal limits but noted to have deficits mainly in delayed recall. Pt will have supervision from wife upon d/c. Made progress with fxnl cognition during stray.      Memory:    (worth no points) - Able to recall 3 words on first trial and 4 words on last trial.   Attention:  5/ 6 - Pt able to repeat numbers in forward and backward order without error. Pt able to identify letter without any error as well. Pt completed serial seven subtraction with error in subtraction for 3 out of 5 subtractions. Language: 3/ 3 - Patient with no errors in this section. Able to name 12 words in 1 minute. Abstraction: 1 / 2 - Patient able to identify category for train and bicycle but when asked category for watch/ruler patient stated both are "graduated" then he said one tells time while other measures. Delayed recall: 3/ 5 - Patient able to recall velvet, [employer]ec[employer]al and becky. Patient unable to recall any adidtional words with categorical cue however with  multiple choice able to recall other two words. MIS score of 11/15  Orientation: 6 / 6 - Patient with no errors in this section. Oriented. 0- point for only/less than high school education       Total score 18/22, indicating normal cognitive function.

## 2023-10-01 ENCOUNTER — HOME CARE VISIT (OUTPATIENT)
Dept: HOME HEALTH SERVICES | Facility: HOME HEALTHCARE | Age: 83
End: 2023-10-01

## 2023-10-02 ENCOUNTER — HOME CARE VISIT (OUTPATIENT)
Dept: HOME HEALTH SERVICES | Facility: HOME HEALTHCARE | Age: 83
End: 2023-10-02

## 2023-10-02 ENCOUNTER — TELEPHONE (OUTPATIENT)
Dept: HEMATOLOGY ONCOLOGY | Facility: CLINIC | Age: 83
End: 2023-10-02

## 2023-10-02 ENCOUNTER — PATIENT OUTREACH (OUTPATIENT)
Dept: CASE MANAGEMENT | Facility: OTHER | Age: 83
End: 2023-10-02

## 2023-10-02 DIAGNOSIS — N18.4 STAGE 4 CHRONIC KIDNEY DISEASE (HCC): Primary | ICD-10-CM

## 2023-10-02 NOTE — PROGRESS NOTES
ADT alert received indicating the patient discharged 9/29/23 to Home with [facility]. I have removed myself off of the care team, added the CM to the care team who will follow the patient through the episode, sent the care manager an inbasket notifying them of the KO/SNF Pathway. Ambulatory referral placed for complex care management.

## 2023-10-02 NOTE — PROGRESS NOTES
10/02/23 1136   Hello, [Guardian’s Name / Patient’s John Paul Holbrook, this is [Caller John Paul Holbrook from [facility], and our clinical care team wanted to check on you / your child after your recent visit to the hospital. It will only take 3-5 minutes. Is this a good time? Discharge Call Type/ Specific Diagnosis: General Call   Call Complete   Attempted Number of Calls 1   Discharge phone call complete?  Left Message

## 2023-10-02 NOTE — PROGRESS NOTES
Outreach TC to patient for initial care management assessment. I spoke with CG, spouse, Layton Hurt. CG reports that patient is feeling well. CG reports that patient denies any s/sx of neurological including unilateral weakness, confusion, dizziness, speech/swallowing problems, sudden changes to vision, headaches, N/V or facial asymmetry. CG reports that patient is independent with ADL's. Patient does have a urostomy and is able to perform ostomy care. Patient is able to ambulate independently with a walker. Patient does need some assist with IADLs. Patient resides with spouse who is willing/able to care for patient. Patient did not complete a [facility] appointment yet. Patient is currently scheduled for 10/18/23 and CG has requested an earlier appointment. Pt/CG  made a f/u with neurosurgery for 10/3/23 and also with hematology for 10/5/23. I reviewed medications including dose, schedule, purpose & side effects of Vitamin C, ferrous gluconate, folic acid, polyethylene glycol, loratadine, melatonin, sodium bicarbonate, magnesium oxide, multivitamins, Farxiga, atorvastatin, calcitriol, torsemide, citalopram, omeprazole, trazodone, hydralazine, amlodipine and acetaminophen with CG, who verbalizes understanding. Reviewed future appointments, s/sx to report and how/when to report symptoms to provider vs. 911. CG verbalizes understanding. Patient has a history of type 2 DM. CG reports that patient is independent with use of his glucometer. Patient apparently forgot to check blood glucose this am. Fasting blood glucose from 10/1/23 was 128 by glucometer. CG educated on role of CM, contact information for CM. Agrees to additional calls. Patient identified for SNF/[facility]way. BMP order placed and PCP notified. Chart review completed.  Patient discharged from [facility] on 9/29/23    Call made to patient and KO pathway interventions reviewed including:    · PCP visit within 1 week-currently scheduled for 10/18/23    · Avoiding NSAIDs    · Adequate nutrition and hydration    · Keeping BP >130 if normal BP not lower    · Checking temperature    · Assessing for weight gain or loss and edema changes since home. · Medication Review    · BMP-ordered 10/2/23     CG verbalizes understanding.

## 2023-10-02 NOTE — TELEPHONE ENCOUNTER
Appointment Change  Cancel, Reschedule, Change to Virtual      Who are you speaking with? Spouse   If it is not the patient, is the caller listed on the communication consent form? Yes   Which provider is the appointment scheduled with? Dr. Beverly Hurst   When was the original appointment scheduled? Please list date and time 10/17/23 2:40pm   At which location is the appointment scheduled to take place? [id number]   Was the appointment rescheduled? Was the appointment changed from an in person visit to a virtual visit? If so, please list the details of the change. 10/5/23 11:40am   What is the reason for the appointment change? Patient wanted to be seen sooner       Was STAR transport scheduled? No   Does STAR transport need to be scheduled for the new visit (if applicable) No   Does the patient need an infusion appointment rescheduled? No   Does the patient have an upcoming infusion appointment scheduled? If so, when? Yes, 10/5/23   Is the patient undergoing chemotherapy? Yes   For appointments cancelled with less than 24 hours:  Was the no-show policy reviewed?  Yes         Patient's spouse would like to know if patient's treatment will be canceled for 10/5/23.  [phone]

## 2023-10-02 NOTE — ASSESSMENT & PLAN NOTE
Presents for 2-week post-hospital follow up for multicompartmental hemorrhage, SAH, and SDH   · S/p fall backwards with head strike 9/15  · No AC/AP medication  · Recovering at [facility] until 9/29 - now home. · Continues to endorse dizziness and imbalance related to blood in ear. · On exam, right sided dysmetria. Otherwise non-focal. Ambulating with walker. Imaging:  · CT head wo, 9/29/2023: No acute intracranial abnormality. Multifocal intracranial hemorrhages have improved since prior exam, as detailed below: Improved trace residual subacute subarachnoid hemorrhage in right anterior frontal region. Improved small subacute brain contusions in bilateral anterior frontal lobes with resolved hyperdense blood products and mild underlying vasogenic edema. Improved trace residual subacute intraventricular hemorrhage layering occipital horn of left lateral ventricle. 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 Unchanged subacute comminuted otic sparing right temporal bone fracture involving the squamosal, mastoid, and tympanic portions with diastases of occipitotemporal suture and extension of fracture into right occipital calvarium. Plan:  · Reviewed imaging extensively with patient and wife, Edwin Green. · Discussed that areas of hemorrhage have improved/resolved. 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 · I do appreciate some increased ventriculomegaly compared to prior. · As suggested on CT read, this may be a result of brain atrophy and ex-vacuo dilatation. · As a precaution, will have patient return in 4 weeks with repeat CT head. · Follow up with ENT as scheduled.

## 2023-10-03 ENCOUNTER — TRANSITIONAL CARE MANAGEMENT (OUTPATIENT)
Dept: INTERNAL MEDICINE CLINIC | Facility: CLINIC | Age: 83
End: 2023-10-03

## 2023-10-03 ENCOUNTER — OFFICE VISIT (OUTPATIENT)
Dept: NEUROSURGERY | Facility: CLINIC | Age: 83
End: 2023-10-03
Payer: MEDICARE

## 2023-10-03 VITALS
HEIGHT: 67 IN | WEIGHT: 183.2 LBS | HEART RATE: 68 BPM | OXYGEN SATURATION: 98 % | TEMPERATURE: 98.1 F | SYSTOLIC BLOOD PRESSURE: 116 MMHG | DIASTOLIC BLOOD PRESSURE: 64 MMHG | RESPIRATION RATE: 17 BRPM | BODY MASS INDEX: 28.75 KG/M2

## 2023-10-03 DIAGNOSIS — S02.19XD CLOSED FRACTURE OF TEMPORAL BONE WITH ROUTINE HEALING, SUBSEQUENT ENCOUNTER: ICD-10-CM

## 2023-10-03 DIAGNOSIS — I60.9 SAH (SUBARACHNOID HEMORRHAGE) (HCC): ICD-10-CM

## 2023-10-03 DIAGNOSIS — S06.5XAA SDH (SUBDURAL HEMATOMA) (HCC): Primary | ICD-10-CM

## 2023-10-03 LAB
ATRIAL RATE: 260 BPM
P AXIS: 230 DEGREES
QRS AXIS: -64 DEGREES
QRSD INTERVAL: 134 MS
QT INTERVAL: 480 MS
QTC INTERVAL: 499 MS
T WAVE AXIS: 73 DEGREES
VENTRICULAR RATE: 65 BPM

## 2023-10-03 PROCEDURE — 99213 OFFICE O/P EST LOW 20 MIN: CPT | Performed by: NURSE PRACTITIONER

## 2023-10-03 PROCEDURE — 93010 ELECTROCARDIOGRAM REPORT: CPT | Performed by: STUDENT IN AN ORGANIZED HEALTH CARE EDUCATION/TRAINING PROGRAM

## 2023-10-03 NOTE — PROGRESS NOTES
10/03/23 0959   Hello, [Guardian’s Name / Patient’s Renan Anderson, this is [Caller Renan Anderson from [facility], and our clinical care team wanted to check on you / your child after your recent visit to the hospital. It will only take 3-5 minutes. Is this a good time? Discharge Call Type/ Specific Diagnosis: General Call   Call Complete   Attempted Number of Calls 2   Discharge phone call complete?  Left Message

## 2023-10-03 NOTE — PROGRESS NOTES
OT DISCHARGE [facility]MAR[facility]    Pt presented to [address] [place] [address] on 9/19/2023 s/p fall at home sustaining temporal bone fx with SDH and SAH. Pt made good progress during acute rehab stay and at time of d/c was at supervision level with use of RW. Completed MOCA on day of d/c and pt given Blind MOCA due to severe macular degeneration Pt scored an 18/22 indicating normal cognitive function. Pt does not drive at baseline and has assist for all IADLs at home. Pt did manage his medications at baseline and recommended wife provide supervision upon d/c. Wife in agreement. Pt d/c home declined commode at time of d/c as pt has raised toilet seat.  Recommended home OT/PT     Kristine Goins

## 2023-10-03 NOTE — PROGRESS NOTES
Neurosurgery Office Note  Everette Verma 80 y.o. male MRN: [id number]      Assessment/Plan     SDH (subdural hematoma) [facility])  Presents for 2-week post-hospital follow up for multicompartmental hemorrhage, SAH, and SDH   · S/p fall backwards with head strike 9/15  · No AC/AP medication  · Recovering at [facility] until 9/29 - now home. · Continues to endorse dizziness and imbalance related to blood in ear. · On exam, right sided dysmetria. Otherwise non-focal. Ambulating with walker. Imaging:  · CT head wo, 9/29/2023: No acute intracranial abnormality. Multifocal intracranial hemorrhages have improved since prior exam, as detailed below: Improved trace residual subacute subarachnoid hemorrhage in right anterior frontal region. Improved small subacute brain contusions in bilateral anterior frontal lobes with resolved hyperdense blood products and mild underlying vasogenic edema. Improved trace residual subacute intraventricular hemorrhage layering occipital horn of left lateral ventricle. 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 Unchanged subacute comminuted otic sparing right temporal bone fracture involving the squamosal, mastoid, and tympanic portions with diastases of occipitotemporal suture and extension of fracture into right occipital calvarium. Plan:  · Reviewed imaging extensively with patient and wife, Mason Sheffield. · Discussed that areas of hemorrhage have improved/resolved. 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 · I do appreciate some increased ventriculomegaly compared to prior. · As suggested on CT read, this may be a result of brain atrophy and ex-vacuo dilatation. · As a precaution, will have patient return in 4 weeks with repeat CT head. · Follow up with ENT as scheduled. SAH (subarachnoid hemorrhage) ([address])  See above. Pneumocephalus  See above.     Fracture of temporal bone ([address])  Otic sparing right temporal bone fracture s/p fall on 9/15/2023. Diagnoses and all orders for this visit:    SDH (subdural hematoma) (HCC)  -     CT head wo contrast; Future    SAH (subarachnoid hemorrhage) (HCC)    Closed fracture of temporal bone with routine healing, subsequent encounter          I have spent a total time of 30 minutes on 10/03/23 in caring for this patient including Diagnostic results, Prognosis, Instructions for management, Patient and family education, Importance of tx compliance, Risk factor reductions, Impressions, Counseling / Coordination of care, Documenting in the medical record, Reviewing / ordering tests, medicine, procedures   and Obtaining or reviewing history  . CHIEF COMPLAINT    Chief Complaint   Patient presents with   • Follow-up       HISTORY    History of Present Illness     80y.o. 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 Marilyn Schumacher, who presented to [facility] ER for evaluation on 9/15 aer sustaining a fall with head strike. He was noted on CT head with multicompartmental hemorrhage including right temporal bone fracture with small amount of pneumocephalus. He was discharged from the hospital to [facility] on 9/19. He was recovering there until 9/29, and has since been at home. He relates that his is doing relatively well. He is accompanied by his wife Alma Eastman. He states since the fall he has been experiencing dizziness and imbalance with ambulation. He was told this is a result of a problem in his ear and he has follow up scheduled with ENT. Denies hearing loss (has audiogram scheduled). Denies headaches. Ambulating with walker. Denies cognitive issues. See Discussion    REVIEW OF SYSTEMS    Review of Systems   Constitutional: Negative. Negative for fatigue. HENT: Positive for tinnitus (both ears right ear bleeding).     Eyes: Visual disturbance: blurry macular degeneration. Macular degeneration blurry vision   Respiratory: Negative. Cardiovascular: Negative. Gastrointestinal: Positive for constipation. Endocrine: Negative. Genitourinary: Negative. Musculoskeletal: Positive for gait problem (uses walker history of fallingbackwards unbalanced). Allergic/Immunologic: Negative. Neurological: Positive for dizziness (,change position fron laying to sitting gets dizzy when standing it subsides) and weakness (history of leg weakness). Negative for tremors, seizures, syncope, speech difficulty, light-headedness, numbness and headaches. Hematological: Negative. Psychiatric/Behavioral: Negative for confusion and sleep disturbance. ROS obtained by MA. Reviewed. See HPI.      Meds/Allergies     Current Outpatient Medications   Medication Sig Dispense Refill   • acetaminophen (TYLENOL) 325 mg tablet Take 2 tablets (650 mg total) by mouth 3 (three) times a day as needed for mild pain  0   • amLODIPine (NORVASC) 5 mg tablet Take 1 tablet (5 mg total) by mouth 2 (two) times a day 60 tablet 0   • ascorbic acid (VITAMIN C) 500 MG tablet Take 1 tablet (500 mg total) by mouth daily Do not start before September 29, 2023. 30 tablet 0   • atorvastatin (LIPITOR) 40 mg tablet TAKE ONE TABLET BY MOUTH AT BEDTIME 90 tablet 3   • Blood Glucose Monitoring Suppl (OneTouch Verio Flex System) w/Device KIT Use to check blood sugars daily 1 kit 0   • calcitriol (ROCALTROL) 0.25 mcg capsule Take 1 tablet 2 times a week (Monday, Friday) (Patient taking differently: Take 1 tablet 2 times a week (Tuesday, Friday)) 24 capsule 3   • citalopram (CeleXA) 20 mg tablet TAKE ONE TABLET BY MOUTH EVERY DAY 30 tablet 3   • Farxiga 5 MG TABS TAKE ONE TABLET BY MOUTH EVERY DAY 90 tablet 2   • ferrous gluconate (FERGON) 324 mg tablet Take 1 tablet (324 mg total) by mouth daily before breakfast Can take ferrous sulfate Do not start before September 29, 2023. 30 tablet 0   • fluticasone (FLONASE) 50 mcg/act nasal spray 1 spray into each nostril daily Do not start before September 29, 2023. 9.9 mL 0   • folic acid (FOLVITE) 1 mg tablet Take 1 tablet (1 mg total) by mouth daily Do not start before September 29, 2023. 30 tablet 0   • hydrALAZINE (APRESOLINE) 10 mg tablet Take 1 tablet (10 mg total) by mouth 3 (three) times a day 90 tablet 0   • Lancets (OneTouch Delica Plus [id number]) MISC TEST BLOOD GLUCOSE ONCE DAILY 100 each 0   • loratadine (CLARITIN) 10 mg tablet Take 1 tablet (10 mg total) by mouth daily as needed for allergies 30 tablet 0   • magnesium oxide (MAG-OX) 400 mg Take 1 tablet (400 mg total) by mouth in the morning. 180 tablet 2   • melatonin 3 mg Take 2 tablets (6 mg total) by mouth daily at bedtime  0   • Multiple Vitamins-Minerals (OCUVITE-LUTEIN PO) Take 1 tablet by mouth 2 (two) times a day Pt is taking preservision      • omeprazole (PriLOSEC) 40 MG capsule TAKE ONE CAPSULE BY MOUTH EVERY MORNING 90 capsule 3   • OneTouch Verio test strip TEST ONCE A  strip 0   • polyethylene glycol (GLYCOLAX) 17 GM/SCOOP powder Take 17 g by mouth daily as needed (Constipation) Can substitute for closest size available 507 g 0   • sodium bicarbonate 650 mg tablet Take 2 tablets (1,300 mg total) by mouth 2 (two) times a day 360 tablet 3   • torsemide (DEMADEX) 20 mg tablet Take 1 tablet (20 mg total) by mouth every other day 90 tablet 0   • traZODone (DESYREL) 50 mg tablet Take 0.5 tablets (25 mg total) by mouth daily at bedtime as needed for sleep 30 tablet 0     No current facility-administered medications for this visit. Allergies   Allergen Reactions   • Fentanyl Hallucinations   • Morphine And Related Other (See Comments)     While having an MI patient received morphine and had adverse reaction but doesn't know what happened.        PAST HISTORY    Past Medical History:   Diagnosis Date   • Acute kidney injury [facility])    • Anemia Jan. 2022   • Arthritis     Hands   • Wright esophagus    • BPH with obstruction/lower urinary tract symptoms    • CAD (coronary artery disease)     Last assessed 09/16/15   • Cancer [facility])     bladder   • Cataract, acquired     Last assessed 10/10/17   • Chronic kidney disease    • Coronary artery disease    • Diabetes mellitus ([address])     NIDDM   • Diabetic neuropathy ([address])     Feet   • Enlarged prostate with lower urinary tract symptoms (LUTS)     Last assessed 10/10/17   • Erectile dysfunction    • GERD (gastroesophageal reflux disease)     Last assessed 10/10/17   • Hemoptysis     Last assessed 03/14/16   • Hypercholesterolemia     Last assessed 10/10/17   • Hypertension     Last assessed 10/10/17   • Kidney stone    • Macular degeneration     Right eye is particularly affected-peripheral vision intact   • Myocardial infarction [facility]) 1998   • OAB (overactive bladder)    • Testicular hypofunction    • Testicular hypogonadism     Last assessed 10/10/17   • Tinnitus    • Umbilical hernia     Last assessed 06/18/14   • Urge incontinence of urine    • Wears glasses        Past Surgical History:   Procedure Laterality Date   • BLADDER SURGERY     • COLONOSCOPY     • CORONARY ARTERY BYPASS GRAFT  07/16/2014    ABG x 4 LIMA to LAD,SVG to diagnoal 2 SVG to OM-1, SVG to PDA, resection of partial plerual mass   • CT GUIDED PERC DRAINAGE CATHETER PLACEMENT  02/19/2021   • CYSTOSCOPY  2013   • CYSTOSCOPY  02/08/2022    [facility]   • ESOPHAGOGASTRODUODENOSCOPY     • FL RETROGRADE PYELOGRAM  12/20/2018   • FL RETROGRADE PYELOGRAM  12/05/2019   • INGUINAL HERNIA REPAIR Bilateral 2015   • IR DRAINAGE TUBE CHECK AND/OR REMOVAL  03/05/2021   • PHOTODYNAMIC THERAPY      For Barretts esophagus   • VA COLONOSCOPY FLX DX W/COLLJ SPEC WHEN PFRMD N/A 04/25/2016    Procedure: COLONOSCOPY;  Surgeon: Massiel Antony MD;  Location: [facility];   Service: Gastroenterology   • VA CYSTO W/REMOVAL OF LESIONS SMALL N/A 12/05/2019    Procedure: CYSTO W/TURBT AND TRANSURETHRAL PROSTATE BIOPSY AND OPENING OF BLADDER NECK CONTRACTURE, B/L Retrograde pyelogram;  Surgeon: Andrew Young MD;  Location[facility];  Service: Urology   • NC CYSTOURETHROSCOPY W/DEST &/RMVL MED BLADDER TY N/A 2020    Procedure: CYSTOSCOPY, TRANSURETHRAL RESECTION OF BLADDER TUMOR (TURBT);   Surgeon: Andrew Young MD;  [facility];  Service: Urology   • NC CYSTOURETHROSCOPY WITH BIOPSY N/A 09/10/2020    Procedure: CYSTOSCOPY, COLLECTION OF LEFT KIDNEY CYTOLOGY, BILATERAL RETROGRADE PYELOGRAM, BLADDER WALL BIOPSIES  AND FULGERAION, RANDOM BLADDER TUMOR BIOPSIES;  Surgeon: Andrew Young MD;  Location: [address] [place] [address] MAIN OR;  Service: Urology   • NC CYSTOURETHROSCOPY WITH BIOPSY N/A 2022    Procedure: urethroscopy , biopsy of urethra with fulgeration;  Surgeon: Homar Koch MD;  Location:  MAIN OR;  Service: Urology   • PROSTATE SURGERY     • TONSILLECTOMY     • TRANSURETHRAL RESECTION OF PROSTATE N/A 2018    Procedure: CYSTO, PHOTO SELECTIVE VAPORIZATION OF PROSTATE, B/L RETROGRADE PYELOGRAM, TURBT;  Surgeon: Andrew Young MD;  [facility];  Service: Urology   • UMBILICAL HERNIA REPAIR     • UPPER GASTROINTESTINAL ENDOSCOPY         Social History     Tobacco Use   • Smoking status: Former     Packs/day: 1.00     Years: 10.00     Total pack years: 10.00     Types: Cigarettes     Quit date: 1972     Years since quittin.7   • Smokeless tobacco: Never   • Tobacco comments:     Quit at age 28   Vaping Use   • Vaping Use: Never used   Substance Use Topics   • Alcohol use: Not Currently     Alcohol/week: 2.0 standard drinks of alcohol     Types: 1 Glasses of wine, 1 Cans of beer per week     Comment: Non since 7/15/2020   • Drug use: Never       Family History   Problem Relation Age of Onset   • Cancer Mother         Topical oral abrasian caused cancer   • Other Mother         Digestive System Complications   • Diabetes Father    • Heart disease Father    • Hypertension Father    • Coronary artery disease Father    • Hyperlipidemia Father          Above history personally reviewed. EXAM    Vitals:Blood pressure 116/64, pulse 68, temperature 98.1 °F (36.7 °C), temperature source Temporal, resp. rate 17, height 5' 7" (1.702 m), weight 83.1 kg (183 lb 3.2 oz), SpO2 98 %. ,Body mass index is 28.69 kg/m². Physical Exam  Constitutional:       General: He is not in acute distress. Appearance: He is well-developed. He is not diaphoretic. Eyes:      General:         Right eye: No discharge. Left eye: No discharge. Extraocular Movements: EOM normal.      Conjunctiva/sclera: Conjunctivae normal.      Pupils: Pupils are equal, round, and reactive to light. Pulmonary:      Effort: Pulmonary effort is normal. No respiratory distress. Abdominal:      General: Bowel sounds are normal. There is no distension. Palpations: Abdomen is soft. Tenderness: There is no abdominal tenderness. Musculoskeletal:         General: Normal range of motion. Cervical back: Normal range of motion and neck supple. Skin:     General: Skin is warm and dry. Neurological:      Mental Status: He is alert and oriented to person, place, and time. Cranial Nerves: Cranial nerves 2-12 are intact. No cranial nerve deficit. Sensory: No sensory deficit. Motor: No weakness. Coordination: Coordination abnormal. Finger-Nose-Finger Test abnormal.      Deep Tendon Reflexes: Reflexes normal.   Psychiatric:         Speech: Speech normal.         Behavior: Behavior normal.         Thought Content: Thought content normal.         Judgment: Judgment normal.         Neurologic Exam     Mental Status   Oriented to person, place, and time. Oriented to person. Oriented to place. Oriented to time. Oriented to year, month and date. Attention: normal. Concentration: normal.   Speech: speech is normal   Level of consciousness: alert  Knowledge: good.      Cranial Nerves   Cranial nerves II through XII intact. CN III, IV, VI   Pupils are equal, round, and reactive to light. Extraocular motions are normal.   Right pupil: Size: 3 mm. Shape: regular. Reactivity: brisk. Consensual response: intact. Accommodation: intact. Left pupil: Size: 3 mm. Shape: regular. Reactivity: brisk. Consensual response: intact. Accommodation: intact. Nystagmus: none   Diplopia: none  Conjugate gaze: present    CN V   Right facial sensation deficit: none  Left facial sensation deficit: none    CN VII   Facial expression full, symmetric. CN VIII   Hearing: intact    CN IX, X   Palate: symmetric    CN XI   Right sternocleidomastoid strength: normal  Left sternocleidomastoid strength: normal  Right trapezius strength: normal  Left trapezius strength: normal    CN XII   Tongue: not atrophic  Fasciculations: absent  Tongue deviation: none    Motor Exam   Muscle bulk: normal  Overall muscle tone: normal  Right arm pronator drift: absent  Left arm pronator drift: absent    Strength   Right deltoid: 5/5  Left deltoid: 5/5  Right biceps: 5/5  Left biceps: 5/5  Right triceps: 5/5  Left triceps: 5/5  Right quadriceps: 5/5  Left quadriceps: 5/5  Right hamstrin/5  Left hamstrin/5  Right anterior tibial: 5/5  Left anterior tibial: 5/5  Right posterior tibial: 5/5  Left posterior tibial: 5/5  Right peroneal: 5/5  Left peroneal: 5/5  Right gastroc: 5/5  Left gastroc: 5/5    Sensory Exam   Light touch normal.   Proprioception normal.     Gait, Coordination, and Reflexes     Coordination   Finger to nose coordination: abnormal    Tremor   Resting tremor: absent  Intention tremor: absent  Action tremor: absentRight dysmetria         MEDICAL DECISION MAKING    Imaging Studies:     CT head wo contrast    Result Date: 2023  Narrative: CT BRAIN - WITHOUT CONTRAST INDICATION:   Subdural hematoma Follow-up SDH. COMPARISON: CT head without contrast 2023, 2023, 2023, 9/15/2023.  CT recon no charge 9/15/2023. TECHNIQUE:  CT examination of the brain was performed. Multiplanar 2D reformatted images were created from the source data. Radiation dose length product (DLP) for this visit:  912 mGy-cm . This examination, like all CT scans performed in the [facility], was performed utilizing techniques to minimize radiation dose exposure, including the use of iterative reconstruction and automated exposure control. IMAGE QUALITY:  Diagnostic. FINDINGS: PARENCHYMA AND EXTRA-AXIAL SPACES: Improved trace residual subacute subarachnoid hemorrhage in right anterior frontal region. Improved small subacute brain contusions in bilateral anterior frontal lobes with resolved hyperdense blood products and mild underlying vasogenic edema. 0.8 cm thick hypodense subdural fluid collection along right cerebral convexity (2:33), previously 1.0 cm thick - favored to represent posttraumatic hygroma. No new or enlarging sites of acute intracranial hemorrhage. Decreased attenuation is noted in periventricular and subcortical white matter demonstrating an appearance that is statistically most likely to represent moderate microangiopathic change. Unchanged chronic lacunar infarct in right basal ganglia and right  cerebellum. No CT signs of acute infarction. No intracranial mass or mass effect. No midline shift, resolved. No acute parenchymal hemorrhage. Arterial calcifications of carotid siphons, basilar, and bilateral intradural vertebral arteries. VENTRICLES: Improved trace residual subacute intraventricular hemorrhage layering occipital horn of left lateral ventricle. Stable mild ventriculomegaly with ex-vacuo dilatation right lateral ventricle frontal horn commensurate with degree of cerebral volume loss. VISUALIZED ORBITS: Normal visualized orbits. PARANASAL SINUSES: Normal visualized paranasal sinuses.  CALVARIUM AND EXTRACRANIAL SOFT TISSUES: Unchanged subacute comminuted otic sparing right temporal bone fracture involving the squamosal, mastoid, and tympanic portions with diastases of occipitotemporal suture and extension of fracture into right occipital calvarium. Unchanged moderate right mastoid effusion with some right middle ear effusion. Impression: No acute intracranial abnormality. Multifocal intracranial hemorrhages have improved since prior exam, as detailed below: - Improved trace residual subacute subarachnoid hemorrhage in right anterior frontal region. - Improved small subacute brain contusions in bilateral anterior frontal lobes with resolved hyperdense blood products and mild underlying vasogenic edema. - Improved trace residual subacute intraventricular hemorrhage layering occipital horn of left lateral ventricle. 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 Unchanged subacute comminuted otic sparing right temporal bone fracture involving the squamosal, mastoid, and tympanic portions with diastases of occipitotemporal suture and extension of fracture into right occipital calvarium. Workstation performed: [id number]     CT head wo contrast    Result Date: 9/21/2023  Narrative: CT BRAIN - WITHOUT CONTRAST INDICATION:   Subarachnoid hemorrhage [facility]), follow up Subdural hemorrhage Worsening pressure to R side of face and forehead, hx of SDH and SAH, hypertensive, r/o worsening hemorrhage. COMPARISON: 9/16/2023, 9/17/2023 TECHNIQUE:  CT examination of the brain was performed. Multiplanar 2D reformatted images were created from the source data. Radiation dose length product (DLP) for this visit:  999 mGy-cm . This examination, like all CT scans performed in [facility], was performed utilizing techniques to minimize radiation dose exposure, including the use of iterative reconstruction and automated exposure control. IMAGE QUALITY:  Diagnostic.  FINDINGS: PARENCHYMA: The small foci of acute hyperdense subarachnoid/subdural hemorrhage along the surface of the right frontotemporal region remains stable. As do the small superficial parenchymal contusions along the left subfrontal region. There are no new intraparenchymal hematomas. Stable background of white matter encephalomalacia. VENTRICLES AND EXTRA-AXIAL SPACES: As noted on the priors there was relative increased subdural hygroma, now continuing to increase asymmetrically on the right with some degree of mass effect and flattening of the right cerebral gyri. There is very slight midline shift to the left of 3 to 4 mm. Basilar cisterns remain patent. The ventricles remain symmetric without significant change in caliber. The volume of layering blood products within the occipital horns is also stable compared to the 17th. VISUALIZED ORBITS: Normal visualized orbits. PARANASAL SINUSES: Normal visualized paranasal sinuses. CALVARIUM AND EXTRACRANIAL SOFT TISSUES:  Normal.     Impression: Interval increasing CSF density right subdural hygroma with increasing mass effect on the sulci. Trace midline shift to the left. Stable hyperdense hemorrhages seen in the right subarachnoid/subdural along the right frontal and right temporal regions. No new area of acute hemorrhage demonstrated. Stable left frontal inferior contusions. Stable ventricular size and stable volume of the hyperdense layering blood products in the occipital horns. The study was marked in [facility] for immediate notification. Workstation performed: [id number]     Echo follow up/limited w/ contrast if indicated    Result Date: 9/18/2023  Narrative: •  Limited echocardiogram performed. •  Left Ventricle: Left ventricular cavity size is normal. Wall thickness is normal. The left ventricular ejection fraction is 50-55% by visual estimation. •  IVS: There is abnormal septal motion of unclear etiology. •  Right Ventricle: Right ventricular cavity size is mildly dilated.  Systolic function is reduced. CT head wo contrast    Result Date: 9/17/2023  Narrative: CT BRAIN - WITHOUT CONTRAST INDICATION:   Subdural hematoma R SDH. COMPARISON: CTs from 9/16 and 9/15/2023. TECHNIQUE:  CT examination of the brain was performed. Multiplanar 2D reformatted images were created from the source data. Radiation dose length product (DLP) for this visit:  925.63 mGy-cm . This examination, like all CT scans performed in the [facility], was performed utilizing techniques to minimize radiation dose exposure, including the use of iterative  reconstruction and automated exposure control. IMAGE QUALITY:  Diagnostic. FINDINGS: PARENCHYMA: Stable small volume of subarachnoid hemorrhage along the anterior inferior frontal convexities and right lateral temporal convexity. Suspect small parenchymal contusion in the paramedian inferior left frontal lobe is unchanged in appearance. Minimal hemorrhage layering in the occipital horns of the lateral ventricles is mildly increased in conspicuity. Small right hemispheric hypodense subdural collection measuring up to 6 mm is mildly increased from yesterday, new since 9/15/2023 and favored to represent posttraumatic subdural hygroma. No significant mass effect, shift or herniation. Stable diminished attenuation in the periventricular and subcortical white matter due to chronic microangiopathy. Stable chronic lacunar infarct in the right basal ganglia. VENTRICLES AND EXTRA-AXIAL SPACES: Volume loss. No hydrocephalus. VISUALIZED ORBITS: Normal visualized orbits. PARANASAL SINUSES: Normal visualized paranasal sinuses. CALVARIUM AND EXTRACRANIAL SOFT TISSUES:  Normal.     Impression: Stable posttraumatic subarachnoid hemorrhage and suspected small contusion in the inferior left frontal lobe.  Minimal hemorrhage in the occipital horns of the lateral ventricles is mildly increased Right hemispheric hypodense subdural collection is mildly increased from yesterday and new since 2015. Favor posttraumatic subdural hygroma. Workstation performed: [id number]     CT head wo contrast    Result Date: 9/16/2023  Narrative: CT BRAIN - WITHOUT CONTRAST INDICATION:   Subarachnoid hemorrhage [facility]), follow up basilar skull fracture with ICH. COMPARISON: 9/15/2023 TECHNIQUE:  CT examination of the brain was performed. Multiplanar 2D reformatted images were created from the source data. Radiation dose length product (DLP) for this visit:  957.66 mGy-cm . This examination, like all CT scans performed in the [facility], was performed utilizing techniques to minimize radiation dose exposure, including the use of iterative  reconstruction and automated exposure control. IMAGE QUALITY:  Diagnostic. FINDINGS: PARENCHYMA: Scattered foci of extra-axial hemorrhage adjacent to the right frontal and temporal regions noted with both subdural and subarachnoid elements. There is also a hypodense component, suggestive of acute on subacute to chronic subdural hematoma. The hypodense/isodense right frontotemporal subdural fluid measures up to 0.7 cm in maximal thickness. No subfalcine herniation. No significant increase in size of the hemorrhage. Chronic lacunar infarction in the right caudate noted. Mild periventricular hypodensities noted. Atherosclerotic calcifications noted. VENTRICLES AND EXTRA-AXIAL SPACES: Small amounts of scattered subarachnoid and subdural hemorrhage in the right frontotemporal region stable. VISUALIZED ORBITS: Normal visualized orbits. PARANASAL SINUSES: Normal visualized paranasal sinuses. CALVARIUM AND EXTRACRANIAL SOFT TISSUES: Scalp hematoma posteriorly in the parietal region noted. No skull fracture. Impression: 1. Scattered hyperdense/acute subarachnoid and subdural hemorrhage in the right frontotemporal region is similar to the study from yesterday. No significant mass effect or new hemorrhage.  There is also small amount of isodense to hypodense subdural fluid  over the right frontotemporal region, possibly subacute to chronic subdural hemorrhage, also stable. 2. Scalp hematoma parietal region posteriorly redemonstrated. 3. Chronic appearing right caudate lacunar infarction and mild, chronic microangiopathy stable. No new large territorial infarction. Workstation performed: [id number]     Echo complete w/ contrast if indicated    Result Date: 9/16/2023  Narrative: •  Left Ventricle: Left ventricular cavity size is normal. Wall thickness is mildly increased. The left ventricular ejection fraction is 50-55%. Systolic function is low normal. Abnormal diastolic inflow patterns due to atrial flutter. Left atrial filling pressure is elevated. •  The following segments are akinetic: basal inferior and mid inferior. •  All other segments are normal. •  IVS: There is systolic flattening of the interventricular septum consistent with right ventricle pressure overload. •  Right Ventricle: Right ventricular cavity size is upper normal. Systolic function is moderately reduced. •  Left Atrium: The atrium is moderately dilated. •  Right Atrium: The atrium is mildly dilated. •  Aortic Valve: The aortic valve is trileaflet. The leaflets are moderately calcified. There is mildly reduced mobility. There is trace regurgitation. There is mild stenosis. •  Mitral Valve: There is mild to moderate regurgitation. •  Tricuspid Valve: There is mild regurgitation. CT chest abdomen pelvis w contrast    Result Date: 9/15/2023  Narrative: CT CHEST, ABDOMEN AND PELVIS WITH IV CONTRAST INDICATION:   abd pain. As per review of electronic medical record, patient with history of bladder cancer status post cystoprostatectomy with ileal conduit in October 2020 with recurrence in May 2022 status post chemotherapy and immunotherapy status post fall backwards with head trauma.  COMPARISON: CT of the abdomen and pelvis from 8/2/2023, 11/30/2022, and 11/25/2020 PET/CT from 5/1/2023, and chest CT from 3/20/2023 and 11/25/2020. TECHNIQUE: CT examination of the chest, abdomen and pelvis was performed. Multiplanar 2D reformatted images were created from the source data. 3D reconstructions of the bony thorax were performed in order to improve sensitivity of evaluation for rib fractures. Intravenous contrast was administered despite elevated creatinine as the exam was part of a trauma evaluation. This examination, like all CT scans performed in the [facility], was performed utilizing techniques to minimize radiation dose exposure, including the use of iterative reconstruction and automated exposure control. Radiation dose length product (DLP) for this visit:  1481.67 mGy-cm IV Contrast:  100 mL of iohexol (OMNIPAQUE) Enteric Contrast: Enteric contrast was not administered. FINDINGS: CHEST BRONCHOPULMONARY:  Clear central airways. Calcified granuloma in the right upper lobe. Stable right upper lobe micronodules (series 4 image 80 and 85). There is a 3 mm right middle lobe nodule abutting the minor fissure (series 4 image 113), which is new since March 2023. There is a 4 mm right upper lobe nodule (series 4 image 130) unchanged dating back to November 2020. There is mucous plugging and bronchial in the right lower lobe stimulating the appearance of nodules (series 4 image 171 - 177). PLEURA: Trace bilateral pleural effusions. No pneumothorax. HEART/GREAT VESSELS: There is stable cardiomegaly. No pericardial effusion. The patient is status post median sternotomy and CABG. Normal caliber thoracic aorta. MEDIASTINUM/LYMPH NODES:  No axillary, mediastinal or hilar lymphadenopathy. There is a right cardiophrenic lymph node measuring 8 mm in short axis, more prominent from prior studies. There are also 2 retrocrural lymph nodes measuring 8 mm in short axis, similar to August 2023, however enlarged since March 2023. Small hiatal hernia.  CHEST WALL AND LOWER NECK: No hyperdense subcutaneous or intramuscular fluid collections to suggest hematoma. Bilateral gynecomastia. ABDOMEN LIVER/BILIARY TREE:  Normal liver morphology. 2 hypodense lesions in the right inferior hepatic lobe measuring up to 8 mm in size, and higher than simple fluid in density (series 2 image 133). These were not present on the last prior contrast-enhanced CT  from November 2020, unclear whether these were present on the prior unenhanced CTs. There is also a subcentimeter hypodense lesion in the liver more superiorly and anteriorly (series 2 image 116), however this is unchanged from November 2020 and most likely represents a cyst. No biliary ductal dilation. GALLBLADDER: Gallstones are seen within the gallbladder. No pericholecystic inflammatory change. SPLEEN: Unremarkable. PANCREAS:  Unremarkable. Normal caliber main pancreatic duct. ADRENAL GLANDS:  Unremarkable. KIDNEYS/URETERS:  Symmetric renal enhancement. No intrarenal or ureteral calculi. No hydronephrosis. No focal renal lesions. STOMACH AND BOWEL: Evaluation of the gastrointestinal tract is somewhat limited by underdistention and lack of oral contrast. The patient is status post right lower quadrant ileal conduit creation. No bowel obstruction or convincing inflammation. Scattered colonic diverticuli, however no evidence of diverticulitis. APPENDIX: Normal appendix. ABDOMINOPELVIC CAVITY:  No pneumoperitoneum. No hyperdense abdominal or pelvic fluid collections to suggest hematoma. Small amount of free simple fluid in the pelvis. There is new peritoneal nodularity along the paracolic gutter, suggestive of peritoneal carcinomatosis (series 604 image 66 and series 65). There is a 1.5 x 1.2 cm soft tissue density nodule in the mesorectal fat on the left (series 2 image 222), new since August 2023. LYMPH NODES: There has been mild interval enlargement of previously seen right-sided retroperitoneal lymph nodes.  For instance an aortocaval lymph node 1.2 cm in short axis (series 2 image 151), compared to 0.9 cm on 8/2/2023. A more distal aortocaval lymph node measures 0.9 cm in short axis (series 2 image 163), compared to 0.7 cm previously. There has been continued interval enlargement of 2 left-sided mesenteric lymph nodes. The more anterior lymph node measures 2.0 x 1.9 cm (series 2 image 175), compared to 1.7 x 1.7 cm on 8/2/2023. The more posterior lymph node measures 2.4 x 1.9 cm (series 2 image 173), compared to 2.1 x 1.7 cm. A right external iliac lymph node measures 1.4 cm in short axis (series 2 image 235), increased since August 2023. There is hazy density around the mesenteric lymph nodes at the root of the mesentery, more prominent compared to prior studies. There is a small hypodense fluid density structure adjacent to a surgical clip along the right pelvic sidewall (series 2 image 211). It measures 1.7 cm in short axis on the current study, similar to August 2023, however is enlarged and new from older studies. VESSELS: Normal caliber aorta. Patent major branch vessels. Scattered atherosclerotic calcifications in the abdominal aorta and its major branches. There are resultant multifocal stenoses in the major branches. PELVIS REPRODUCTIVE ORGANS/URINARY BLADDER: The patient is status post cystoprostatectomy with right lower quadrant ileal conduit. There is a soft tissue density nodule measuring approximately 1.5 cm in diameter (series 2 image 233) inseparable from the left obturator internus muscle corresponding to an area of increased FDG activity on the May 2023 PET/CT. Some free fluid is seen in the surgical bed as well with no organized collection. ABDOMINAL WALL/INGUINAL REGIONS: No ventral abdominal wall hernia. MUSCULOSKELETAL:  No acute fracture. No focal aggressive osseous lesions. Degenerative changes of the spine. Please see separate report for the CT of the spine performed concurrently. No hyperdense subcutaneous or intramuscular fluid collections to suggest hematoma. Impression: 1) No acute thoracic, abdominal or pelvic trauma. 2) 1.5 cm soft tissue density nodule inseparable from the left obturator internus muscle, corresponding to the focus of increased FDG activity on May 2023 PET/CT and concerning for local recurrence. 3) Interval increase in size of 2 dominant left-sided mesenteric lymph nodes, as well as mild interval increase in size of a right external iliac lymph node and at least 2 right-sided retroperitoneal lymph nodes since 8/2/2023 CT, concerning for metastatic disease. 4) Hypodense structure along the right pelvic sidewall is similar to August 2023, however is new from older studies, concerning for a necrotic lymph node metastasis. 5) Two hypodense lesions measuring higher than simple fluid in density in the right inferior hepatic lobe, not present on prior contrast-enhanced CT from November 2020, concerning for metastatic disease. 6) Peritoneal soft tissue nodularity in the left paracolic gutter, concerning for peritoneal carcinomatosis. 7) 1.5 cm implant in the mesorectal fat on the left, new since August 2023. 8) Nonspecific right cardiophrenic lymph node measuring 8 mm in short axis and 2 retrocrural lymph nodes also measuring up to 8 mm, more prominent compared to older studies, also possible metastases. 9) Nonspecific 3 mm right middle lobe nodule, new since March 2023. Attention on follow-up. 10) Multiple additional findings as above. The study was marked in [facility] for immediate notification. Workstation performed: [id number]     CT spine thoracic & lumbar wo contrast    Result Date: 9/15/2023  Narrative: CT LUMBAR SPINE INDICATION:   Low back pain, trauma ttp. COMPARISON: None. TECHNIQUE:  Contiguous axial images through the lumbar spine were obtained. Sagittal and coronal reconstructions were performed. IV Contrast: 100 mL of iohexol (OMNIPAQUE) Radiation dose length product (DLP) for this visit:  0 mGy-cm .   This examination, like all CT scans performed in the [facility], was performed utilizing techniques to minimize radiation dose exposure, including the use of iterative reconstruction and automated exposure control. IMAGE QUALITY:  Diagnostic. FINDINGS: ALIGNMENT:  There are 5 lumbar type vertebral bodies. Trace anterolisthesis of L4-L5. VERTEBRAE:  No fracture. No lytic or blastic lesion. Schmorl's node along the inferior endplate of L1. DEGENERATIVE CHANGES: There are multilevel degenerative changes of the lumbar spine. Multifactorial disease results in overall mild canal stenosis and mild foraminal narrowing at L3-L4. There is a bulging annulus. There is facet arthrosis with ligament flavum thickening at L4-L5. This results in at least moderate canal stenosis. There is mild to moderate bilateral foraminal narrowing. PARASPINAL SOFT TISSUES: No paravertebral soft tissue hematoma. Correlate with separate CT of the chest, abdomen and pelvis for additional findings. Impression: No acute fractures. No evidence for traumatic malalignment. Workstation performed: [id number]     CT recon (no charge)    Result Date: 9/15/2023  Narrative: CT TEMPORAL BONES WITHOUT CONTRAST INDICATION:   fall concern specifically for base of skull fx. . COMPARISON: Same day CT head and cervical spine without contrast TECHNIQUE: Using a multi-detector scanner, 0.625 mm axial scans of the temporal bone were acquired using a high-resolution bone technique. Targeted reconstructions were obtained of each side, including axial, coronal, and oblique views. All reconstructed images were reviewed. Soft tissue reconstructions were performed as well. Radiation dose length product (DLP) for this visit:  0 mGy-cm . This examination, like all CT scans performed in the [facility], was performed utilizing techniques to minimize radiation dose exposure, including the use of iterative reconstruction and automated exposure control.  IV Contrast: None IMAGE QUALITY: Diagnostic. FINDINGS: RIGHT TEMPORAL BONE: Acute comminuted otic sparing right temporal bone fracture involving the squamosal, mastoid, and tympanic portions with diastases of occipitotemporal suture and extension of fracture into visualized right occipital calvarium. PERIAURICULAR SOFT TISSUES: Small scalp hematoma in right occipito-temporal region. EXTERNAL AUDITORY CANAL: Debris in right external auditory canal likely containing some blood products. MIDDLE EAR: Large middle ear effusion involving epitympanum, mesotympanum, and hypotympanum - likely containing blood products. OSSICLES: Middle ear ossicles are intact. No evidence of dislocation. MASTOID AIR CELLS: Please see above discussion regarding fracture. Small-to-moderate right mastoid effusion with blood products. COCHLEA: Normal. OTIC CAPSULE: Normal mineralization. VESTIBULE: Normal. VESTIBULAR AND COCHLEAR AQUEDUCT: Normal SEMICIRCULAR CANALS: Normal. INTERNAL AUDITORY CANAL: Normal. FACIAL NERVE CANAL: Normal. CAROTID CANAL: Normal. JUGULAR FORAMEN: Normal. LEFT TEMPORAL BONE: PERIAURICULAR SOFT TISSUES:  Normal. EXTERNAL AUDITORY CANAL: Normal. MIDDLE EAR: Normal. OSSICLES:Normal. MASTOID AIR CELLS: Trace left mastoid effusion. Otherwise, normal. COCHLEA: Normal. OTIC CAPSULE: Normal mineralization. VESTIBULE: Normal. VESTIBULAR AND COCHLEAR AQUEDUCT: Normal SEMICIRCULAR CANALS: Normal. INTERNAL AUDITORY CANAL: Normal. FACIAL NERVE CANAL: Normal. CAROTID CANAL: Normal. JUGULAR FORAMEN: Normal. OTHER FINDINGS: Please see same day CT head without contrast for further evaluation especially given right-sided subarachnoid hemorrhage and small volume pneumocephalus. Impression: RIGHT TEMPORAL BONE CT - Acute comminuted otic sparing right temporal bone fracture involving the squamosal, mastoid, and tympanic portions with diastases of occipitotemporal suture and extension of fracture into visualized right occipital calvarium.  Recommend follow-up CTV head with contrast to assess underlying patency of right dural venous sinuses. - Probable small-to-moderate RIGHT-sided bloody mastoid effusion, large bloody middle ear effusion, and probable debris and blood products throughout external auditory canal. - Small scalp hematoma in right occipito-temporal region. LEFT TEMPORAL BONE CT -Trace left mastoid effusion. Otherwise, normal LEFT temporal bone CT. Please see same day CT head without contrast for further evaluation especially given right-sided subarachnoid hemorrhage and small volume pneumocephalus. The study was marked in [facility] for immediate notification. Workstation performed: [id number]     US bedside procedure    Result Date: 9/15/2023  Narrative: [id number]. [id number]    CT head without contrast    Result Date: 9/15/2023  Narrative: CT BRAIN - WITHOUT CONTRAST INDICATION:   Head trauma, minor (Age >= 65y) occipital trauma. COMPARISON:  None. TECHNIQUE:  CT examination of the brain was performed. Multiplanar 2D reformatted images were created from the source data. Radiation dose length product (DLP) for this visit:  1033.52 mGy-cm . This examination, like all CT scans performed in the [facility], was performed utilizing techniques to minimize radiation dose exposure, including the use of iterative reconstruction and automated exposure control. IMAGE QUALITY:  Diagnostic. FINDINGS: PARENCHYMA: There is small amount of right frontal extra-axial hemorrhage in the subdural region with associated subarachnoid hemorrhage in the inferior right frontal region, image 34 series 3 additional area of extra-axial hemorrhage right insular region, measuring about 6 mm Small areas of hemorrhagic contusion versus foci of additional subarachnoid hemorrhage in the left temporal region, image 29 series 3. Small foci of subarachnoid hemorrhage in the inferior left frontal region, image 32 series 3 There is no midline shift.  VENTRICLES AND EXTRA-AXIAL SPACES: Ventricles appear unremarkable. There is no intraventricular hemorrhage seen Air noted within the right sigmoid sinus and the right transverse sinus with associated fracture of the right temporal bone and right occipital bone with small amount of subdural air VISUALIZED ORBITS: Normal visualized orbits. PARANASAL SINUSES: No air-fluid level seen in the paranasal sinuses Soft tissue swelling over the right temporal region CALVARIUM AND EXTRACRANIAL SOFT TISSUES: Hematoma in the left parietal vertex and left occipital region Nondisplaced fracture through the right temporal bone. Linear nondisplaced fracture through the posterior wall of the condyle with the right middle ear hemorrhage. Fracture passes through the hypotympanum Nondisplaced fracture through the right occipital bone    Impression: Multicompartment hemorrhage with small foci of subarachnoid hemorrhage in the bilateral inferior frontal region. Small foci of subdural hemorrhage in the right insular region and temporal region without significant mass effect Additional small foci of subarachnoid hemorrhage versus small contusions in the right temporal region Air noted within the right sigmoid sinus, transverse sinus with associated fracture of the right temporal bone with small foci of extra-axial air in the right middle cranial fossa and hemorrhage in the right middle ear. Nondisplaced fracture through the right occipital bone I personally discussed this study with Yuki Birmingham on 9/15/2023 3:01 PM. Workstation performed: [id number]     CT cervical spine without contrast    Result Date: 9/15/2023  Narrative: CT CERVICAL SPINE - WITHOUT CONTRAST INDICATION:   Neck trauma (Age >= 65y) fall, edlerly. COMPARISON:  None. TECHNIQUE:  CT examination of the cervical spine was performed without intravenous contrast.  Contiguous axial images were obtained. Multiplanar 2D reformatted images were created from the source data.  Radiation dose length product (DLP) for this visit:  451.15 mGy-cm . This examination, like all CT scans performed in the [facility], was performed utilizing techniques to minimize radiation dose exposure, including the use of iterative  reconstruction and automated exposure control. IMAGE QUALITY:  Diagnostic. FINDINGS: ALIGNMENT:  Normal alignment of the cervical spine. No subluxation. VERTEBRAE:  No fracture. DEGENERATIVE CHANGES: Degenerative changes seen at the atlantoaxial joint. C2-3 level demonstrates ridging with uncovertebral spurring with no significant right or left foraminal narrowing C3-4 demonstrates uncovertebral spurring with no significant right and moderate left foraminal narrowing. C4-5 demonstrates uncovertebral spurring with moderate to severe right foraminal narrowing and no significant left foraminal narrowing C5-6 demonstrates ridging with uncovertebral spurring with mild left and no significant right foraminal narrowing C6/7 demonstrates ridging with moderate left and mild right foraminal narrowing and mild central canal narrowing C7-T1 level appear unremarkable PREVERTEBRAL AND PARASPINAL SOFT TISSUES: Unremarkable Right thyroid nodule seen which measures about 1.8 cm THORACIC INLET:  Normal.     Impression: No acute compression collapse of the vetebra Fracture of the right temporal bone, incompletely assessed Incidental 1.8 cm right thyroid nodule, meets the size/criteria for further assessment with ultrasound for an incidentally detected nodule Workstation performed: [id number]     XR chest 1 view portable    Result Date: 9/15/2023  Narrative: CHEST INDICATION:   Cough. COMPARISON: Chest radiograph April 11, 2023 EXAM PERFORMED/VIEWS:  XR CHEST PORTABLE FINDINGS: There are median sternotomy wires indicating prior cardiac surgery. Cardiomediastinal silhouette appears unremarkable. Chronic loculated small left pleural effusion is similar to prior study. No focal consolidation.  No pneumothorax. Osseous structures appear within normal limits for patient age. Impression: No acute cardiopulmonary disease. Workstation performed: [id number]       I have personally reviewed pertinent reports.    and I have personally reviewed pertinent films in PACS

## 2023-10-04 ENCOUNTER — TELEPHONE (OUTPATIENT)
Dept: HEMATOLOGY ONCOLOGY | Facility: CLINIC | Age: 83
End: 2023-10-04

## 2023-10-04 ENCOUNTER — TELEPHONE (OUTPATIENT)
Dept: INFUSION CENTER | Facility: HOSPITAL | Age: 83
End: 2023-10-04

## 2023-10-04 ENCOUNTER — HOME CARE VISIT (OUTPATIENT)
Dept: HOME HEALTH SERVICES | Facility: HOME HEALTHCARE | Age: 83
End: 2023-10-04
Payer: MEDICARE

## 2023-10-04 VITALS
DIASTOLIC BLOOD PRESSURE: 62 MMHG | OXYGEN SATURATION: 97 % | HEART RATE: 42 BPM | SYSTOLIC BLOOD PRESSURE: 130 MMHG | TEMPERATURE: 97.5 F | RESPIRATION RATE: 16 BRPM

## 2023-10-04 PROCEDURE — 10330081 VN NO-PAY CLAIM PROCEDURE

## 2023-10-04 PROCEDURE — G0299 HHS/HOSPICE OF RN EA 15 MIN: HCPCS

## 2023-10-04 NOTE — TELEPHONE ENCOUNTER
Wife reports that patient will not be at infusion tomorrow. Previous phone message states pt is still in rehab. Please cancel infusion appt tomorrow.  Thanks

## 2023-10-04 NOTE — TELEPHONE ENCOUNTER
Called and l/m on cell phone that pt needs to have bloodwork drawn before tomorrows appt.   If unable to get bloodwork done, will need to have him come in earlier for treatment
00:00

## 2023-10-04 NOTE — TELEPHONE ENCOUNTER
Elam Claude was returning our call in regards to taking Priyanka Bullard for blood work. She informed me that his appointment with the [address] [place] on 10/5 was to be canceled per Dr. Franklin Sears office. I will cancel his appointment on 10/5.

## 2023-10-05 ENCOUNTER — OFFICE VISIT (OUTPATIENT)
Dept: HEMATOLOGY ONCOLOGY | Facility: CLINIC | Age: 83
End: 2023-10-05
Payer: MEDICARE

## 2023-10-05 ENCOUNTER — HOSPITAL ENCOUNTER (OUTPATIENT)
Dept: INFUSION CENTER | Facility: HOSPITAL | Age: 83
Discharge: HOME | End: 2023-10-05
Attending: INTERNAL MEDICINE

## 2023-10-05 VITALS
HEIGHT: 67 IN | DIASTOLIC BLOOD PRESSURE: 80 MMHG | HEART RATE: 66 BPM | SYSTOLIC BLOOD PRESSURE: 126 MMHG | WEIGHT: 183.6 LBS | RESPIRATION RATE: 17 BRPM | OXYGEN SATURATION: 96 % | BODY MASS INDEX: 28.82 KG/M2 | TEMPERATURE: 97.6 F

## 2023-10-05 DIAGNOSIS — E03.2 DRUG-INDUCED HYPOTHYROIDISM: ICD-10-CM

## 2023-10-05 DIAGNOSIS — C67.8 MALIGNANT NEOPLASM OF OVERLAPPING SITES OF BLADDER (HCC): Primary | ICD-10-CM

## 2023-10-05 PROCEDURE — 99214 OFFICE O/P EST MOD 30 MIN: CPT | Performed by: INTERNAL MEDICINE

## 2023-10-05 NOTE — PHYSICAL THERAPY NOTE
Pt 80 yr old male sp fall moving item into his truck, +head strike, +temp bone fx, dx +SDH, SAH, +R ear bleeding. PMH: macular generation, CKD, CAD, BPH with h/o radical cystoprostectomy with illeal conduit (x3 yrs ago), DM, Mi. At baseline pt was generally fully I with use of SPC outdoors PRN, non  due to visual deficits. Has RW and transport chair. Lives with spouse in a multi-level condo, with main floor bed/bathroom, 1+1 no HR NANO from front, pt usually uses 3 NANO from garage L HR, R grab bar. Pt reports being forgetful and really not feeling motivated or not having much energy to do thing recently. Pt progressed well t/o acute rehab stay once medically stable as pt had bouts of varied Bps and fluctuating medical status. Pt overall met all set goals at S level due to baseline visual deficits. Spouse participated in FT, HEP provided, pt has RW. DC home with family support and home PT recommended to follow.

## 2023-10-05 NOTE — PROGRESS NOTES
Progress Note: Medical Oncology:  Odalis Don 80 y.o. male MRN: [id number]  Unit/Bed#:  Encounter: [id number]    Assessment and Plan:  1. 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 ECOG Performance Status: 3-4:  Patient is an 70-year-old male, with an established history of stage IV (pT3b, pN3, cM1, Grade 3) bladder cancer; who presents today for follow-up. Of note, patient has a complex history of bladder cancer (Now Status-Post Radial Cystoprostatectomy to Ileal-Conduit with Urinary Diversion in October 2020); presenting with disease progression on immunotherapy with Avelumab. Repeat CT Chest, Abdomen, and Pelvis on September 15th demonstrates diffuse metastasis, evidenced by interval increase in the size of two dominant left-sided mesenteric lymph nodes, as well as mild increase in size of a right external iliac lymph node, and at least two right-sided retroperitoneal lymph nodes. Peritoneal soft tissue nodularity was noted in the left paracolic gutter, and is concerning for peritoneal carcinomatosis. Two hypodense lesions in the inferior hepatic lobe also noted, as well. Unfortunately, patient was recently hospitalized at [address] [place] [address] following a mechanical fall, complicated by right temporal bone fracture with associated multicompartment hemorrhage. His performance status remains particularly poor following hospital discharge. At this point in time, he is not a candidate for further systemic-chemotherapy. Will recommend outpatient follow-up in 4-weeks, for further evaluation. Plan to repeat CBC-D, CMP, and TSH prior to follow-up. Treatment options pending re-evaluation. Patient expressed understanding and agreement. 1. Discontinued immunotherapy with Avelumab. 2. Recommend outpatient follow-up in 4-weeks for further evaluation:    A. Will repeat CBC-D, CMP, and TSH in anticipation of follow-up.     C. Note: Holding systemic-therapy pending follow-up due to poor functional status. Subjective: Interval History: Breonna WilsonHemanth Bailon is an 51-year-old male, with an established history of stage IV (pT3b, pN3, cM1, Grade 3) bladder cancer; who presents today for follow-up. Of particular importance, patient was recently admitted to [address] [place] [address] from September 15th through September 19th following a ground-level fall. The above-mentioned was complicated by fracture of the right temporal bone, right occipital bone, and the posterior wall of the right condyle, with an accompanying multicompartment hemorrhage consisting of right frontal extra-axial subdural hemorrhage with associated subarachnoid hemorrhage in the inferior right frontal region, and an additional area of extra-axial hemorrhage in the right insular region. Small areas of hemorrhagic contusion versus subarachnoid hemorrhage in the left temporal region noted, as well as small foci of subarachnoid hemorrhage in the inferior left frontal region. No midline shift reported. Neurosurgery was consulted. No acute surgical intervention was indicated on initial evaluation. Given stability with close interval monitoring, patient was medically cleared for discharged to the inpatient rehabilitation facility. Patient was rehabilitated from September 19th through September 29th. He has since returned to home. Patient is anticipating outpatient evaluation by Physical Therapy tomorrow, October 6th. At this time, he remains weak, and largely deconditioned. He ambulates with a rolling walker; and he requires assistance from his spouse to perform all basic activities of daily living (e.g. Including Bathing). He remains largely sedentary at this time, given the above-mentioned. Appetite is otherwise intact. No unintentional weight-loss reported. Bowel movements are regular with administration of a stool softener. Patient denies fever, chills, nightsweats, weight-loss, or abdominal discomfort.     Review of Systems:  All systems reviewed and negative except otherwise listed in the History of Present Illness. Laboratory Studies:          Imaging Studies:  CT Chest, Abdomen, and Pelvis: September 15th, 2023 -      Objective:  Vitals:    10/05/23 1141   BP: 126/80   Pulse: 66   Resp: 17   Temp: 97.6 °F (36.4 °C)   SpO2: 96%     Invasive Devices     Drain  Duration           Urostomy Ureterostomy right RLQ 19 days              Physical Exam:  General: Alert, and oriented; Pleasant, and conversational; Chronically-Ill Appearing; Frail  Skin: Pale in Coloration  HEENT: Atraumatic, and normocephalic; PERRLA; EOMI; Moist mucosal membranes  Neck: Trachea midline; No neck masses, thyromegaly, or cervical lymphadenopathy present on my exam  Cardiovascular: Regular rate and rhythm; No murmurs, rubs, or gallops appreciated; No peripheral edema  Respiratory: Clear to auscultation bilaterally; Adequate aeration; No supplemental oxygen requirement  Abdomen: Soft, non-tender; Non-distended; No organomegaly appreciated; Bowel sounds present in 4-quadrants  Genitourinary: Right Lower Quadrant Urostomy  Extremities: No obvious deformities; No peripheral edema; Moves all  Neurologic: Appropriately alert, and oriented to Person, Place, and Time; No focal neurologic deficits  Gait: Walks with Assistance of Rolling-Walker    Lab Results: I have reviewed all pertinent labs. Imaging: I have personally reviewed pertinent reports. EKG, Pathology, and Other Studies: I have personally reviewed pertinent reports. Patient was seen and discussed with attending physician, Renetta Bentley M.D.    Christine Espinoza D.O.   Hematology-Oncology Fellow (PGY-4)

## 2023-10-06 ENCOUNTER — HOME CARE VISIT (OUTPATIENT)
Dept: HOME HEALTH SERVICES | Facility: HOME HEALTHCARE | Age: 83
End: 2023-10-06
Payer: MEDICARE

## 2023-10-06 ENCOUNTER — OFFICE VISIT (OUTPATIENT)
Dept: INTERNAL MEDICINE CLINIC | Facility: CLINIC | Age: 83
End: 2023-10-06
Payer: MEDICARE

## 2023-10-06 VITALS
HEART RATE: 66 BPM | OXYGEN SATURATION: 96 % | DIASTOLIC BLOOD PRESSURE: 82 MMHG | SYSTOLIC BLOOD PRESSURE: 146 MMHG | TEMPERATURE: 98.5 F | WEIGHT: 183 LBS | BODY MASS INDEX: 28.72 KG/M2 | HEIGHT: 67 IN

## 2023-10-06 VITALS
DIASTOLIC BLOOD PRESSURE: 68 MMHG | RESPIRATION RATE: 20 BRPM | OXYGEN SATURATION: 98 % | SYSTOLIC BLOOD PRESSURE: 140 MMHG | HEART RATE: 76 BPM

## 2023-10-06 DIAGNOSIS — E11.59 TYPE 2 DIABETES MELLITUS WITH OTHER CIRCULATORY COMPLICATION, WITHOUT LONG-TERM CURRENT USE OF INSULIN (HCC): Primary | ICD-10-CM

## 2023-10-06 DIAGNOSIS — S06.5XAA SDH (SUBDURAL HEMATOMA) (HCC): ICD-10-CM

## 2023-10-06 DIAGNOSIS — N18.4 BENIGN HYPERTENSION WITH CHRONIC KIDNEY DISEASE, STAGE IV (HCC): ICD-10-CM

## 2023-10-06 DIAGNOSIS — D69.6 PLATELETS DECREASED (HCC): ICD-10-CM

## 2023-10-06 DIAGNOSIS — I12.9 BENIGN HYPERTENSION WITH CHRONIC KIDNEY DISEASE, STAGE IV (HCC): ICD-10-CM

## 2023-10-06 DIAGNOSIS — D49.4 BLADDER TUMOR: ICD-10-CM

## 2023-10-06 PROCEDURE — 99495 TRANSJ CARE MGMT MOD F2F 14D: CPT | Performed by: INTERNAL MEDICINE

## 2023-10-06 PROCEDURE — G0151 HHCP-SERV OF PT,EA 15 MIN: HCPCS

## 2023-10-06 NOTE — ASSESSMENT & PLAN NOTE
Patient is here today for transition of care visit. Patient was hospitalized after accidental injury fall at home. Did hit his head. Ended up with subarachnoid hemorrhage subdural hematoma. Patient was evaluated in the emergency room admitted to the hospital.  Seen by hospitalist physicians neurosurgery. Patient was stabilized medically and serial studies showed no significant progression in fact some progression of abnormalities repeat CT of the head. With his instability and to make sure he returns home in a stable condition he was sent to rehab. He is now returning today with his wife. We did review recent studies of the brain showing continued resolution of abnormalities that were secondary to trauma. Patient still has some difficulties and he had substantial bleeding from his right ear. On evaluation he does have some dried blood at the area of his tympanic membrane which seems to be perforated. No evidence of infection. As an appointment to be seen by an ENT physician in the near future for further evaluation and we will continue to follow-up with neurology. Patient is receiving physical therapy at home and also had a recent follow-up visit to be seen by his hematologist oncologist and the [facility] Sites going any chemotherapy until he is stable otherwise medically. Along with other lab testing and was sent for a hemoglobin A1c to be performed to make sure his sugar is under control. Patient on exam today is awake alert no acute distress and oriented x3. Accompanied by his wife. Patient's pupils are equal round reactive to light extraocular motion intact no focal neurologic abnormalities on exam.  Patient will continue with physical therapy, Occupational Therapy skilled nursing care at home. We will continue to monitor blood pressure blood sugars and make adjustment with medication if needed in the future.

## 2023-10-06 NOTE — PROGRESS NOTES
Name: Hung Weathers      : 1940      MRN: [id number]  Encounter Provider: Ki Curran DO  Encounter Date: 10/6/2023   Encounter department: [address] [place] [address]  TCM Call     Date and time call was made  10/3/2023  3:11 PM    Hospital care reviewed  Records reviewed    Patient was hospitialized at  [address] [place] [address]     Date of Admission  23    Date of discharge  23    Diagnosis  subarachnoid hemorrhage    Disposition  Home    Were the patients medications reviewed and updated  Yes    Current Symptoms  None      TCM Call     Post hospital issues  None    Should patient be enrolled in anticoag monitoring? No    Scheduled for follow up? Yes    Did you obtain your prescribed medications  Yes    Do you need help managing your prescriptions or medications  No    Is transportation to your appointment needed  No    I have advised the patient to call PCP with any new or worsening symptoms  Tash Rivera MA    Living Arrangements  Family members    Support System  Family; Friends    The type of support provided  Emotional; Physical    Do you have social support  Yes, as much as I need    Are you recieving any outpatient services  No    Are you recieving home care services  No    Are you using any community resources  No    Current waiver services  No    Have you fallen in the last 12 months  No    Interperter language line needed  No    Counseling  Patient    Comments  Patient is scheduled for an appointment on 3/2/21          Assessment & Plan     1. Type 2 diabetes mellitus with other circulatory complication, without long-term current use of insulin (HCC)  -     Hemoglobin A1C; Future    2. SDH (subdural hematoma) ([facility])  Assessment & Plan:  Patient is here today for transition of care visit. Patient was hospitalized after accidental injury fall at home. Did hit his head. Ended up with subarachnoid hemorrhage subdural hematoma.   Patient was evaluated in the emergency room admitted to the hospital.  Seen by hospitalist physicians neurosurgery. Patient was stabilized medically and serial studies showed no significant progression in fact some progression of abnormalities repeat CT of the head. With his instability and to make sure he returns home in a stable condition he was sent to rehab. He is now returning today with his wife. We did review recent studies of the brain showing continued resolution of abnormalities that were secondary to trauma. Patient still has some difficulties and he had substantial bleeding from his right ear. On evaluation he does have some dried blood at the area of his tympanic membrane which seems to be perforated. No evidence of infection. As an appointment to be seen by an ENT physician in the near future for further evaluation and we will continue to follow-up with neurology. Patient is receiving physical therapy at home and also had a recent follow-up visit to be seen by his hematologist oncologist and the [facility] going any chemotherapy until he is stable otherwise medically. Along with other lab testing and was sent for a hemoglobin A1c to be performed to make sure his sugar is under control. Patient on exam today is awake alert no acute distress and oriented x3. Accompanied by his wife. Patient's pupils are equal round reactive to light extraocular motion intact no focal neurologic abnormalities on exam.  Patient will continue with physical therapy, Occupational Therapy skilled nursing care at home. We will continue to monitor blood pressure blood sugars and make adjustment with medication if needed in the future. 3. 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 He continues to follow-up with hematology oncology. This probably led to some abnormal bleeding with his head trauma. Again no active bleeding at this point in time      4.  Benign hypertension with chronic kidney disease, stage IV [facility])  Assessment & Plan:  Continues to follow-up with nephrology and they continue to monitor his kidney function. He has had a notable decrease in his GFR since last lab studies through our office. 5. 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 Patient also has had further progression of his disease to his ureters. Continues to follow-up with hematology/oncology and urology. Subjective     Patient is an 80-year-old male history of multiple medical problems outlined previously who is here today for transition of care visit after recent hospitalization after accidental fall at home with head trauma. Patient was stabilized during hospitalization and then sent for rehab which she has now completed and has returned to home. Patient is continuing with outpatient in-home physical therapy, Occupational Therapy and skilled nursing care. Patient is presenting today with his wife, walking with a walker. He is awake alert with no specific complaints. Review of Systems   Constitutional: Positive for activity change. Negative for appetite change, chills, diaphoresis, fatigue, fever and unexpected weight change. Still has some weakness slowly working with therapist to increase strength and stability. No recent falls other than noted. HENT: Positive for hearing loss. Negative for congestion, dental problem, drooling, ear discharge, ear pain, facial swelling, mouth sores, nosebleeds, postnasal drip, rhinorrhea, sinus pressure, sinus pain, sneezing, sore throat, tinnitus, trouble swallowing and voice change. Decreased auditory acuity to his right ear   Eyes: Negative. Respiratory: Negative. Cardiovascular: Negative. Gastrointestinal: Negative. Endocrine: Negative. Genitourinary: Negative. Musculoskeletal: Negative. Skin: Negative.     Allergic/Immunologic: Negative. Neurological: Positive for headaches. Negative for dizziness, tremors, seizures, syncope, facial asymmetry, speech difficulty, weakness, light-headedness and numbness. Patient relates he has had a very slight headache. Not debilitating. Rare   Hematological: Negative. Psychiatric/Behavioral: Negative.         Past Medical History:   Diagnosis Date   • Acute kidney injury ([address])    • Anemia Jan. 2022   • Arthritis     Hands   • Wright esophagus    • BPH with obstruction/lower urinary tract symptoms    • CAD (coronary artery disease)     Last assessed 09/16/15   • Cancer ([address])     bladder   • Cataract, acquired     Last assessed 10/10/17   • Chronic kidney disease    • Coronary artery disease    • Diabetes mellitus ([address])     NIDDM   • Diabetic neuropathy ([address])     Feet   • Enlarged prostate with lower urinary tract symptoms (LUTS)     Last assessed 10/10/17   • Erectile dysfunction    • GERD (gastroesophageal reflux disease)     Last assessed 10/10/17   • Hemoptysis     Last assessed 03/14/16   • Hypercholesterolemia     Last assessed 10/10/17   • Hypertension     Last assessed 10/10/17   • Kidney stone    • Macular degeneration     Right eye is particularly affected-peripheral vision intact   • Myocardial infarction [facility]) 1998   • OAB (overactive bladder)    • Testicular hypofunction    • Testicular hypogonadism     Last assessed 10/10/17   • Tinnitus    • Umbilical hernia     Last assessed 06/18/14   • Urge incontinence of urine    • Wears glasses      Past Surgical History:   Procedure Laterality Date   • BLADDER SURGERY     • COLONOSCOPY     • CORONARY ARTERY BYPASS GRAFT  07/16/2014    ABG x 4 LIMA to LAD,SVG to diagnoal 2 SVG to OM-1, SVG to PDA, resection of partial plerual mass   • CT GUIDED [facility] [place] DRAINAGE CATHETER PLACEMENT  02/19/2021   • CYSTOSCOPY  2013   • CYSTOSCOPY  02/08/2022    Olya   • ESOPHAGOGASTRODUODENOSCOPY     • FL RETROGRADE PYELOGRAM  12/20/2018   • FL RETROGRADE PYELOGRAM  12/05/2019   • INGUINAL HERNIA REPAIR Bilateral 2015   • IR DRAINAGE TUBE CHECK AND/OR REMOVAL  03/05/2021   • PHOTODYNAMIC THERAPY      For Barretts esophagus   • MI COLONOSCOPY FLX DX W/COLLJ SPEC WHEN PFRMD N/A 04/25/2016    Procedure: COLONOSCOPY;  Surgeon: Irais Galloway MD;  Location:  GI LAB; Service: Gastroenterology   • MI CYSTO W/REMOVAL OF LESIONS SMALL N/A 12/05/2019    Procedure: CYSTO W/TURBT AND TRANSURETHRAL PROSTATE BIOPSY AND OPENING OF BLADDER NECK CONTRACTURE, B/L Retrograde pyelogram;  Surgeon: Liza Mcgarry MD;  Location: [facility];  Service: Urology   • MI CYSTOURETHROSCOPY W/DEST &/RMVL MED BLADDER TY N/A 04/16/2020    Procedure: CYSTOSCOPY, TRANSURETHRAL RESECTION OF BLADDER TUMOR (TURBT);   Surgeon: Liza Mcgarry MD;  Location: [facility];  Service: Urology   • MI CYSTOURETHROSCOPY WITH BIOPSY N/A 09/10/2020    Procedure: CYSTOSCOPY, COLLECTION OF LEFT KIDNEY CYTOLOGY, BILATERAL RETROGRADE PYELOGRAM, BLADDER WALL BIOPSIES  AND FULGERAION, RANDOM BLADDER TUMOR BIOPSIES;  Surgeon: Liza Mcgarry MD;  Location: [address] [place] [address] MAIN OR;  Service: Urology   • MI CYSTOURETHROSCOPY WITH BIOPSY N/A 04/05/2022    Procedure: urethroscopy , biopsy of urethra with fulgeration;  Surgeon: Puma Castellon MD;  Location:  MAIN OR;  Service: Urology   • PROSTATE SURGERY     • TONSILLECTOMY     • TRANSURETHRAL RESECTION OF PROSTATE N/A 12/20/2018    Procedure: CYSTO, PHOTO SELECTIVE VAPORIZATION OF PROSTATE, B/L RETROGRADE PYELOGRAM, TURBT;  Surgeon: Liza Mcgarry MD;  Location: [facility];  Service: Urology   • UMBILICAL HERNIA REPAIR  2012   • UPPER GASTROINTESTINAL ENDOSCOPY       Family History   Problem Relation Age of Onset   • Cancer Mother         Topical oral abrasian caused cancer   • Other Mother         Digestive System Complications   • Diabetes Father    • Heart disease Father    • Hypertension Father    • Coronary artery disease Father    • Hyperlipidemia Father Social History     Socioeconomic History   • Marital status: /Civil Union     Spouse name: None   • Number of children: None   • Years of education: None   • Highest education level: None   Occupational History   • Occupation: Sales position   Tobacco Use   • Smoking status: Former     Packs/day: 1.00     Years: 10.00     Total pack years: 10.00     Types: Cigarettes     Quit date: 1972     Years since quittin.7   • Smokeless tobacco: Never   • Tobacco comments:     Quit at age 28   Vaping Use   • Vaping Use: Never used   Substance and Sexual Activity   • Alcohol use: Not Currently     Alcohol/week: 2.0 standard drinks of alcohol     Types: 1 Glasses of wine, 1 Cans of beer per week     Comment: Non since 7/15/2020   • Drug use: Never   • Sexual activity: Not Currently     Partners: Female   Other Topics Concern   • None   Social History Narrative    Always uses seatbelt        Caffeine use- Drinks 2 cups of coffee daily     Social Determinants of Health     Financial Resource Strain: Low Risk  (2022)    Overall Financial Resource Strain (CARDIA)    • Difficulty of Paying Living Expenses: Not very hard   Food Insecurity: No Food Insecurity (2023)    Hunger Vital Sign    • Worried About Running Out of Food in the Last Year: Never true    • Ran Out of Food in the Last Year: Never true   Transportation Needs: No Transportation Needs (2023)    PRAPARE - Transportation    • Lack of Transportation (Medical): No    • Lack of Transportation (Non-Medical):  No   Physical Activity: Not on file   Stress: Not on file   Social Connections: Not on file   Intimate Partner Violence: Not on file   Housing Stability: Low Risk  (2023)    Housing Stability Vital Sign    • Unable to Pay for Housing in the Last Year: No    • Number of Places Lived in the Last Year: 1    • Unstable Housing in the Last Year: No     Current Outpatient Medications on File Prior to Visit   Medication Sig   • acetaminophen (TYLENOL) 325 mg tablet Take 2 tablets (650 mg total) by mouth 3 (three) times a day as needed for mild pain   • amLODIPine (NORVASC) 5 mg tablet Take 1 tablet (5 mg total) by mouth 2 (two) times a day   • ascorbic acid (VITAMIN C) 500 MG tablet Take 1 tablet (500 mg total) by mouth daily Do not start before September 29, 2023. • atorvastatin (LIPITOR) 40 mg tablet TAKE ONE TABLET BY MOUTH AT BEDTIME   • Blood Glucose Monitoring Suppl (OneTouch Verio Flex System) w/Device KIT Use to check blood sugars daily   • calcitriol (ROCALTROL) 0.25 mcg capsule Take 1 tablet 2 times a week (Monday, Friday) (Patient taking differently: Take 1 tablet 2 times a week (Tuesday, Friday))   • citalopram (CeleXA) 20 mg tablet TAKE ONE TABLET BY MOUTH EVERY DAY   • Farxiga 5 MG TABS TAKE ONE TABLET BY MOUTH EVERY DAY   • ferrous gluconate (FERGON) 324 mg tablet Take 1 tablet (324 mg total) by mouth daily before breakfast Can take ferrous sulfate Do not start before September 29, 2023. • fluticasone (FLONASE) 50 mcg/act nasal spray 1 spray into each nostril daily Do not start before September 29, 2023. • folic acid (FOLVITE) 1 mg tablet Take 1 tablet (1 mg total) by mouth daily Do not start before September 29, 2023. • hydrALAZINE (APRESOLINE) 10 mg tablet Take 1 tablet (10 mg total) by mouth 3 (three) times a day   • Lancets (OneTouch Delica Plus [id number]) MISC TEST BLOOD GLUCOSE ONCE DAILY   • loratadine (CLARITIN) 10 mg tablet Take 1 tablet (10 mg total) by mouth daily as needed for allergies   • magnesium oxide (MAG-OX) 400 mg Take 1 tablet (400 mg total) by mouth in the morning.    • melatonin 3 mg Take 2 tablets (6 mg total) by mouth daily at bedtime   • Multiple Vitamins-Minerals (OCUVITE-LUTEIN PO) Take 1 tablet by mouth 2 (two) times a day Pt is taking preservision    • omeprazole (PriLOSEC) 40 MG capsule TAKE ONE CAPSULE BY MOUTH EVERY MORNING   • OneTouch Verio test strip TEST ONCE A DAY   • polyethylene glycol (GLYCOLAX) 17 GM/SCOOP powder Take 17 g by mouth daily as needed (Constipation) Can substitute for closest size available   • sodium bicarbonate 650 mg tablet Take 2 tablets (1,300 mg total) by mouth 2 (two) times a day   • torsemide (DEMADEX) 20 mg tablet Take 1 tablet (20 mg total) by mouth every other day   • traZODone (DESYREL) 50 mg tablet Take 0.5 tablets (25 mg total) by mouth daily at bedtime as needed for sleep     Allergies   Allergen Reactions   • Fentanyl Hallucinations   • Morphine And Related Other (See Comments)     While having an MI patient received morphine and had adverse reaction but doesn't know what happened. Immunization History   Administered Date(s) Administered   • COVID-19 MODERNA VACC 0.5 ML IM 01/18/2021, 02/15/2021   • Influenza Split High Dose Preservative Free IM 10/10/2013, 09/22/2014, 10/11/2016   • Influenza, high dose seasonal 0.7 mL 01/22/2019, 10/08/2019, 09/03/2020, 11/23/2021, 11/29/2022   • Pneumococcal Conjugate 13-Valent 05/23/2019   • Pneumococcal Polysaccharide PPV23 03/11/2014   • Tdap 09/15/2023       Objective     /82 (BP Location: Left arm, Patient Position: Sitting, Cuff Size: Standard)   Pulse 66   Temp 98.5 °F (36.9 °C)   Ht 5' 7" (1.702 m)   Wt 83 kg (183 lb)   SpO2 96%   BMI 28.66 kg/m²     Physical Exam  Vitals and nursing note reviewed. Constitutional:       General: He is not in acute distress. Appearance: Normal appearance. He is not ill-appearing, toxic-appearing or diaphoretic. Comments: Patient is an 51-year-old male who is awake alert walking with a walker accompanied by his wife. In no acute distress   HENT:      Head: Normocephalic and atraumatic. Right Ear: External ear normal. There is no impacted cerumen. Left Ear: Tympanic membrane, ear canal and external ear normal. There is no impacted cerumen. Ears:      Comments: Some dried blood at the external canal on the right.   Appearance shows possible complete rupture of his tympanic membrane     Nose: Nose normal. No congestion or rhinorrhea. Mouth/Throat:      Mouth: Mucous membranes are moist.      Pharynx: Oropharynx is clear. No oropharyngeal exudate or posterior oropharyngeal erythema. Eyes:      General: No scleral icterus. Right eye: No discharge. Left eye: No discharge. Extraocular Movements: Extraocular movements intact. Conjunctiva/sclera: Conjunctivae normal.      Pupils: Pupils are equal, round, and reactive to light. Neck:      Vascular: No carotid bruit. Cardiovascular:      Rate and Rhythm: Normal rate and regular rhythm. Heart sounds: Normal heart sounds. No murmur heard. No friction rub. No gallop. Pulmonary:      Effort: Pulmonary effort is normal. No respiratory distress. Breath sounds: Normal breath sounds. No stridor. No wheezing, rhonchi or rales. Chest:      Chest wall: No tenderness. Abdominal:      General: Bowel sounds are normal. There is no distension. Palpations: Abdomen is soft. There is no mass. Tenderness: There is no abdominal tenderness. There is no right CVA tenderness, left CVA tenderness, guarding or rebound. Hernia: No hernia is present. Comments: Ileostomy bag in place midline. Draining clear urine   Musculoskeletal:         General: No swelling, tenderness, deformity or signs of injury. Cervical back: Normal range of motion and neck supple. No rigidity or tenderness. Right lower leg: No edema. Left lower leg: No edema. Comments: Patient has generalized wasting of muscle tissue decreased strength both upper and lower extremities. Lymphadenopathy:      Cervical: No cervical adenopathy. Skin:     General: Skin is warm and dry. Coloration: Skin is not jaundiced or pale. Findings: No bruising, erythema, lesion or rash. Neurological:      Mental Status: He is alert and oriented to person, place, and time.  Mental status is at baseline. Cranial Nerves: No cranial nerve deficit. Sensory: Sensory deficit present. Motor: Weakness present. Coordination: Coordination abnormal.      Gait: Gait abnormal.      Deep Tendon Reflexes: Reflexes abnormal.      Comments: Because of generalized weakness to lower extremities is walking with a walker. Again decreased muscle tone and strength most notable to both lower extremities and this is equal.  Chronic paresthesia bilateral lower extremities secondary to peripheral neuropathy diabetes   Psychiatric:         Mood and Affect: Mood normal.         Behavior: Behavior normal.         Thought Content:  Thought content normal.         Judgment: Judgment normal.       Priya Eugene, DO

## 2023-10-06 NOTE — ASSESSMENT & PLAN NOTE
Patient does have a history of decreased platelet counts. He continues to follow-up with hematology oncology. This probably led to some abnormal bleeding with his head trauma.   Again no active bleeding at this point in time

## 2023-10-06 NOTE — ASSESSMENT & PLAN NOTE
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 Patient also has had further progression of his disease to his ureters. Continues to follow-up with hematology/oncology and urology.

## 2023-10-06 NOTE — ASSESSMENT & PLAN NOTE
Continues to follow-up with nephrology and they continue to monitor his kidney function. He has had a notable decrease in his GFR since last lab studies through our office.

## 2023-10-07 NOTE — CASE COMMUNICATION
Informational only. .no response required    PT evaluation completed. POC 1x1wk 2x4wks to improve functional strength, dynamic balance and safety with mobility in home and exiting home.   LTG progress to OP PT

## 2023-10-09 ENCOUNTER — HOME CARE VISIT (OUTPATIENT)
Dept: HOME HEALTH SERVICES | Facility: HOME HEALTHCARE | Age: 83
End: 2023-10-09
Payer: MEDICARE

## 2023-10-09 ENCOUNTER — TELEPHONE (OUTPATIENT)
Dept: INTERNAL MEDICINE CLINIC | Facility: CLINIC | Age: 83
End: 2023-10-09

## 2023-10-09 VITALS
DIASTOLIC BLOOD PRESSURE: 72 MMHG | OXYGEN SATURATION: 96 % | TEMPERATURE: 98.2 F | HEART RATE: 68 BPM | SYSTOLIC BLOOD PRESSURE: 140 MMHG | RESPIRATION RATE: 16 BRPM

## 2023-10-09 DIAGNOSIS — E11.3293 TYPE 2 DIABETES MELLITUS WITH BOTH EYES AFFECTED BY MILD NONPROLIFERATIVE RETINOPATHY WITHOUT MACULAR EDEMA, WITHOUT LONG-TERM CURRENT USE OF INSULIN (HCC): ICD-10-CM

## 2023-10-09 PROCEDURE — G0299 HHS/HOSPICE OF RN EA 15 MIN: HCPCS

## 2023-10-09 NOTE — TELEPHONE ENCOUNTER
I did call the patient's wife back. Left a message. Did suggest that the patient get all 3 vaccines but definitely at least a 2-week delay between them. Started out with the flu vaccine then COVID-vaccine then the RSV. She is urged to call if any other questions.

## 2023-10-09 NOTE — TELEPHONE ENCOUNTER
Wife called in inquiring if Pt is able to get the Covid booster, Flu shot and RSV shot  due to him being a cancer pt and having a recent head injury she is unsure.

## 2023-10-10 ENCOUNTER — HOME CARE VISIT (OUTPATIENT)
Dept: HOME HEALTH SERVICES | Facility: HOME HEALTHCARE | Age: 83
End: 2023-10-10
Payer: MEDICARE

## 2023-10-10 VITALS — OXYGEN SATURATION: 98 % | HEART RATE: 68 BPM | DIASTOLIC BLOOD PRESSURE: 66 MMHG | SYSTOLIC BLOOD PRESSURE: 136 MMHG

## 2023-10-10 PROCEDURE — G0152 HHCP-SERV OF OT,EA 15 MIN: HCPCS

## 2023-10-11 ENCOUNTER — HOME CARE VISIT (OUTPATIENT)
Dept: HOME HEALTH SERVICES | Facility: HOME HEALTHCARE | Age: 83
End: 2023-10-11
Payer: MEDICARE

## 2023-10-11 ENCOUNTER — TELEPHONE (OUTPATIENT)
Dept: ENDOCRINOLOGY | Facility: CLINIC | Age: 83
End: 2023-10-11

## 2023-10-11 VITALS
DIASTOLIC BLOOD PRESSURE: 84 MMHG | HEART RATE: 70 BPM | SYSTOLIC BLOOD PRESSURE: 120 MMHG | OXYGEN SATURATION: 97 % | RESPIRATION RATE: 20 BRPM

## 2023-10-11 PROCEDURE — G0151 HHCP-SERV OF PT,EA 15 MIN: HCPCS

## 2023-10-11 NOTE — TELEPHONE ENCOUNTER
Patient is taking farxiga . It is becoming too expensive. He is asking for a different rx to be called into [facility]. . tadvised patient to call insurance co to see what would be covered.  thank you

## 2023-10-13 ENCOUNTER — HOME CARE VISIT (OUTPATIENT)
Dept: HOME HEALTH SERVICES | Facility: HOME HEALTHCARE | Age: 83
End: 2023-10-13
Payer: MEDICARE

## 2023-10-13 ENCOUNTER — APPOINTMENT (OUTPATIENT)
Dept: LAB | Facility: CLINIC | Age: 83
End: 2023-10-13
Payer: MEDICARE

## 2023-10-13 VITALS
SYSTOLIC BLOOD PRESSURE: 140 MMHG | DIASTOLIC BLOOD PRESSURE: 76 MMHG | OXYGEN SATURATION: 98 % | RESPIRATION RATE: 18 BRPM | HEART RATE: 78 BPM | TEMPERATURE: 97.8 F

## 2023-10-13 DIAGNOSIS — R82.89 POSITIVE URINARY CYTOLOGY: ICD-10-CM

## 2023-10-13 DIAGNOSIS — E03.2 DRUG-INDUCED HYPOTHYROIDISM: ICD-10-CM

## 2023-10-13 DIAGNOSIS — D49.4 BLADDER TUMOR: ICD-10-CM

## 2023-10-13 DIAGNOSIS — D63.1 ANEMIA DUE TO STAGE 4 CHRONIC KIDNEY DISEASE: ICD-10-CM

## 2023-10-13 DIAGNOSIS — C67.8 MALIGNANT NEOPLASM OF OVERLAPPING SITES OF BLADDER (HCC): ICD-10-CM

## 2023-10-13 DIAGNOSIS — C67.5 MALIGNANT NEOPLASM OF URINARY BLADDER NECK (HCC): ICD-10-CM

## 2023-10-13 DIAGNOSIS — N18.4 STAGE 4 CHRONIC KIDNEY DISEASE (HCC): ICD-10-CM

## 2023-10-13 DIAGNOSIS — E11.59 TYPE 2 DIABETES MELLITUS WITH OTHER CIRCULATORY COMPLICATION, WITHOUT LONG-TERM CURRENT USE OF INSULIN (HCC): ICD-10-CM

## 2023-10-13 DIAGNOSIS — N18.4 ANEMIA DUE TO STAGE 4 CHRONIC KIDNEY DISEASE: ICD-10-CM

## 2023-10-13 LAB
ANION GAP SERPL CALCULATED.3IONS-SCNC: 9 MMOL/L
BUN SERPL-MCNC: 46 MG/DL (ref 5–25)
CALCIUM SERPL-MCNC: 8.9 MG/DL (ref 8.4–10.2)
CHLORIDE SERPL-SCNC: 102 MMOL/L (ref 96–108)
CO2 SERPL-SCNC: 23 MMOL/L (ref 21–32)
CREAT SERPL-MCNC: 2.12 MG/DL (ref 0.6–1.3)
EST. AVERAGE GLUCOSE BLD GHB EST-MCNC: 140 MG/DL
GFR SERPL CREATININE-BSD FRML MDRD: 27 ML/MIN/1.73SQ M
GLUCOSE SERPL-MCNC: 174 MG/DL (ref 65–140)
HBA1C MFR BLD: 6.5 %
POTASSIUM SERPL-SCNC: 4.2 MMOL/L (ref 3.5–5.3)
SODIUM SERPL-SCNC: 134 MMOL/L (ref 135–147)
TSH SERPL DL<=0.05 MIU/L-ACNC: 1.64 UIU/ML (ref 0.45–4.5)

## 2023-10-13 PROCEDURE — G0299 HHS/HOSPICE OF RN EA 15 MIN: HCPCS

## 2023-10-13 PROCEDURE — 36415 COLL VENOUS BLD VENIPUNCTURE: CPT

## 2023-10-13 PROCEDURE — 84443 ASSAY THYROID STIM HORMONE: CPT

## 2023-10-13 PROCEDURE — 80048 BASIC METABOLIC PNL TOTAL CA: CPT

## 2023-10-13 PROCEDURE — 83036 HEMOGLOBIN GLYCOSYLATED A1C: CPT

## 2023-10-14 ENCOUNTER — HOME CARE VISIT (OUTPATIENT)
Dept: HOME HEALTH SERVICES | Facility: HOME HEALTHCARE | Age: 83
End: 2023-10-14
Payer: MEDICARE

## 2023-10-14 VITALS
SYSTOLIC BLOOD PRESSURE: 138 MMHG | RESPIRATION RATE: 20 BRPM | DIASTOLIC BLOOD PRESSURE: 70 MMHG | HEART RATE: 65 BPM | OXYGEN SATURATION: 97 %

## 2023-10-14 PROCEDURE — G0151 HHCP-SERV OF PT,EA 15 MIN: HCPCS

## 2023-10-15 ENCOUNTER — HOME CARE VISIT (OUTPATIENT)
Dept: HOME HEALTH SERVICES | Facility: HOME HEALTHCARE | Age: 83
End: 2023-10-15
Payer: MEDICARE

## 2023-10-16 ENCOUNTER — HOME CARE VISIT (OUTPATIENT)
Dept: HOME HEALTH SERVICES | Facility: HOME HEALTHCARE | Age: 83
End: 2023-10-16
Payer: MEDICARE

## 2023-10-16 VITALS — DIASTOLIC BLOOD PRESSURE: 70 MMHG | SYSTOLIC BLOOD PRESSURE: 140 MMHG | RESPIRATION RATE: 20 BRPM

## 2023-10-16 PROCEDURE — G0152 HHCP-SERV OF OT,EA 15 MIN: HCPCS

## 2023-10-16 PROCEDURE — G0151 HHCP-SERV OF PT,EA 15 MIN: HCPCS

## 2023-10-18 ENCOUNTER — HOME CARE VISIT (OUTPATIENT)
Dept: HOME HEALTH SERVICES | Facility: HOME HEALTHCARE | Age: 83
End: 2023-10-18
Payer: MEDICARE

## 2023-10-18 ENCOUNTER — TELEPHONE (OUTPATIENT)
Dept: HEMATOLOGY ONCOLOGY | Facility: CLINIC | Age: 83
End: 2023-10-18

## 2023-10-18 VITALS
HEART RATE: 68 BPM | DIASTOLIC BLOOD PRESSURE: 70 MMHG | SYSTOLIC BLOOD PRESSURE: 142 MMHG | TEMPERATURE: 97.5 F | OXYGEN SATURATION: 98 % | RESPIRATION RATE: 16 BRPM

## 2023-10-18 PROCEDURE — G0299 HHS/HOSPICE OF RN EA 15 MIN: HCPCS

## 2023-10-18 PROCEDURE — G0152 HHCP-SERV OF OT,EA 15 MIN: HCPCS

## 2023-10-18 NOTE — TELEPHONE ENCOUNTER
Spoke with spouse, she reports patient already had his flu shot. She was questioning RSV and Covid Vaccine. Notified her, okay for Covid vaccine as long as its 2 weeks apart from flu shot. Will review RSV with Dr. Benoit Pryor and call her back.

## 2023-10-18 NOTE — TELEPHONE ENCOUNTER
Called patient to obtain an update and he states there is nothing similar that is covered.   Metformin is covered and he was on that previous, he isn't sure if that is a possibility

## 2023-10-18 NOTE — TELEPHONE ENCOUNTER
Patient Call    Who are you speaking with? Spouse    If it is not the patient, are they listed on an active communication consent form? Yes   What is the reason for this call? She wanted to know if it was ok for pt to get covid and flu shot   Does this require a call back? Yes   If a call back is required, please list best call back number [phone]   If a call back is required, advise that a message will be forwarded to their care team and someone will return their call as soon as possible. Did you relay this information to the patient?  Yes

## 2023-10-19 ENCOUNTER — HOME CARE VISIT (OUTPATIENT)
Dept: HOME HEALTH SERVICES | Facility: HOME HEALTHCARE | Age: 83
End: 2023-10-19
Payer: MEDICARE

## 2023-10-19 ENCOUNTER — PATIENT OUTREACH (OUTPATIENT)
Dept: CASE MANAGEMENT | Facility: OTHER | Age: 83
End: 2023-10-19

## 2023-10-19 VITALS
OXYGEN SATURATION: 99 % | RESPIRATION RATE: 20 BRPM | HEART RATE: 68 BPM | SYSTOLIC BLOOD PRESSURE: 138 MMHG | DIASTOLIC BLOOD PRESSURE: 74 MMHG

## 2023-10-19 PROCEDURE — G0151 HHCP-SERV OF PT,EA 15 MIN: HCPCS

## 2023-10-19 NOTE — PROGRESS NOTES
Spoke with Abisai Jain and he is doing well. Using walker as of now, but is anticipating discharge from [facility] PT/OT and hopes to be able to ambulate without the walker while at home. Denies an weakness, confusion or further neuro symptoms except he is a little dizzy when first getting up, but he sits for a bit and then uses his walker to ambulate. He felt his blood sugar was a bit low this morning at 107 and I encouraged him to eat a small snack before bed. He agreed as his blood sugar yesterday after a small snack was 137. BP's are checked during home health visits and he feels they are around 496 systolic. Elkin Machucacarlo He denies any significant lower extremity swelling and this is also checked during home visit. He has a visit tomorrow with his retinologist to check his macular degeneration. He does not drive at this time; his wife does all the driving. He denies any needs at this time and I verified that he had call back information for Stella Orosco, . Next outreach scheduled for  11/16.
No

## 2023-10-20 ENCOUNTER — ESTABLISHED COMPREHENSIVE EXAM (OUTPATIENT)
Dept: URBAN - METROPOLITAN AREA CLINIC 6 | Facility: CLINIC | Age: 83
End: 2023-10-20

## 2023-10-20 DIAGNOSIS — H35.3231: ICD-10-CM

## 2023-10-20 DIAGNOSIS — H25.813: ICD-10-CM

## 2023-10-20 DIAGNOSIS — E11.3293: ICD-10-CM

## 2023-10-20 PROCEDURE — 92134 CPTRZ OPH DX IMG PST SGM RTA: CPT

## 2023-10-20 PROCEDURE — J0178S EYLEA PFS SAMPLE

## 2023-10-20 PROCEDURE — 92014 COMPRE OPH EXAM EST PT 1/>: CPT | Mod: 25

## 2023-10-20 PROCEDURE — 67028 INJECTION EYE DRUG: CPT

## 2023-10-20 PROCEDURE — 92202 OPSCPY EXTND ON/MAC DRAW: CPT | Mod: 59

## 2023-10-20 ASSESSMENT — VISUAL ACUITY
OS_SC: 20/150+1
OU_SC: 20/400-1

## 2023-10-20 ASSESSMENT — TONOMETRY
OD_IOP_MMHG: 12
OS_IOP_MMHG: 13

## 2023-10-23 ENCOUNTER — HOME CARE VISIT (OUTPATIENT)
Dept: HOME HEALTH SERVICES | Facility: HOME HEALTHCARE | Age: 83
End: 2023-10-23
Payer: MEDICARE

## 2023-10-23 PROCEDURE — G0152 HHCP-SERV OF OT,EA 15 MIN: HCPCS

## 2023-10-25 ENCOUNTER — HOME CARE VISIT (OUTPATIENT)
Dept: HOME HEALTH SERVICES | Facility: HOME HEALTHCARE | Age: 83
End: 2023-10-25
Payer: MEDICARE

## 2023-10-25 VITALS
TEMPERATURE: 97.3 F | HEART RATE: 69 BPM | OXYGEN SATURATION: 99 % | DIASTOLIC BLOOD PRESSURE: 74 MMHG | RESPIRATION RATE: 16 BRPM | SYSTOLIC BLOOD PRESSURE: 146 MMHG

## 2023-10-25 PROCEDURE — G0299 HHS/HOSPICE OF RN EA 15 MIN: HCPCS

## 2023-10-26 ENCOUNTER — HOME CARE VISIT (OUTPATIENT)
Dept: HOME HEALTH SERVICES | Facility: HOME HEALTHCARE | Age: 83
End: 2023-10-26
Payer: MEDICARE

## 2023-10-26 VITALS
RESPIRATION RATE: 20 BRPM | HEART RATE: 67 BPM | DIASTOLIC BLOOD PRESSURE: 74 MMHG | SYSTOLIC BLOOD PRESSURE: 134 MMHG | OXYGEN SATURATION: 97 %

## 2023-10-26 PROCEDURE — G0151 HHCP-SERV OF PT,EA 15 MIN: HCPCS

## 2023-10-27 ENCOUNTER — TELEPHONE (OUTPATIENT)
Dept: HEMATOLOGY ONCOLOGY | Facility: CLINIC | Age: 83
End: 2023-10-27

## 2023-10-27 ENCOUNTER — HOME CARE VISIT (OUTPATIENT)
Dept: HOME HEALTH SERVICES | Facility: HOME HEALTHCARE | Age: 83
End: 2023-10-27
Payer: MEDICARE

## 2023-10-27 ENCOUNTER — TELEPHONE (OUTPATIENT)
Dept: VASCULAR SURGERY | Facility: CLINIC | Age: 83
End: 2023-10-27

## 2023-10-27 PROCEDURE — G0152 HHCP-SERV OF OT,EA 15 MIN: HCPCS

## 2023-10-27 NOTE — TELEPHONE ENCOUNTER
Returned call to patients wife. She states pt has been nauseous and having some vomiting after meals. Mostly lunch and dinner. She gave him a zofran yesterday prior to eating and this helped resolve the symptoms. Pt was able to eat, and he did not vomit. She will trial zofran approximately 30 minutes prior to meals to see if this helps patient, and will call us back if this does not work. She states pt has enough medication at home and does not need a refill at this time.

## 2023-10-27 NOTE — TELEPHONE ENCOUNTER
Received attached VM via teams:  "Good morning, Karen Wu, This is Dary Serrano calling regarding my , Maricarmen Cheatham. He's having some gastrointestinal symptoms that I'm kind of concerned about and I would like to discuss it with someone and who can talk to Doctor Hugo Hollingsworth about it to see if there's any kind of medication we can have or whatever we can do. His YOB: 1940 and again it's about Santino Ochoa birth date 1940. Thank you. My phone number is [phone]. Thank you, Karen Wu.  Bye."

## 2023-10-28 ENCOUNTER — HOME CARE VISIT (OUTPATIENT)
Dept: HOME HEALTH SERVICES | Facility: HOME HEALTHCARE | Age: 83
End: 2023-10-28
Payer: MEDICARE

## 2023-10-28 VITALS
OXYGEN SATURATION: 99 % | HEART RATE: 76 BPM | SYSTOLIC BLOOD PRESSURE: 140 MMHG | DIASTOLIC BLOOD PRESSURE: 76 MMHG | RESPIRATION RATE: 20 BRPM

## 2023-10-28 PROCEDURE — G0151 HHCP-SERV OF PT,EA 15 MIN: HCPCS

## 2023-10-30 ENCOUNTER — TELEPHONE (OUTPATIENT)
Dept: HEMATOLOGY ONCOLOGY | Facility: CLINIC | Age: 83
End: 2023-10-30

## 2023-10-30 NOTE — TELEPHONE ENCOUNTER
Received attached VM via teams:  "Good morning Andie. This is Noe Fragoso calling regarding my  Ana Cuenca. He was we just found out from the DNA nurse. He was to have blood tests on the 27th, but we didn't get that information until after the 27th. I'm calling to see if he could get his blood work done this Wednesday November first since he has a cat scan on Friday. So the question is can he get his blood work on Wednesday the 1st of November? His the YOB: 1940 phone number [phone] and again it's regarding Brijesh Sandoval 1940. Thanks [employer]	[place]. Bye."      Spoke with spouse, pt will have labs completed this Wednesday.  Pt scheduled for follow up on 11/16

## 2023-10-31 ENCOUNTER — HOME CARE VISIT (OUTPATIENT)
Dept: HOME HEALTH SERVICES | Facility: HOME HEALTHCARE | Age: 83
End: 2023-10-31
Payer: MEDICARE

## 2023-10-31 VITALS
RESPIRATION RATE: 20 BRPM | DIASTOLIC BLOOD PRESSURE: 72 MMHG | OXYGEN SATURATION: 97 % | SYSTOLIC BLOOD PRESSURE: 132 MMHG | HEART RATE: 71 BPM

## 2023-10-31 PROCEDURE — G0151 HHCP-SERV OF PT,EA 15 MIN: HCPCS

## 2023-11-01 ENCOUNTER — APPOINTMENT (OUTPATIENT)
Dept: LAB | Facility: CLINIC | Age: 83
End: 2023-11-01
Payer: MEDICARE

## 2023-11-01 DIAGNOSIS — C67.5 MALIGNANT NEOPLASM OF URINARY BLADDER NECK (HCC): ICD-10-CM

## 2023-11-01 DIAGNOSIS — N18.4 STAGE 4 CHRONIC KIDNEY DISEASE (HCC): ICD-10-CM

## 2023-11-01 DIAGNOSIS — D63.1 ANEMIA DUE TO STAGE 4 CHRONIC KIDNEY DISEASE: ICD-10-CM

## 2023-11-01 DIAGNOSIS — R82.89 POSITIVE URINARY CYTOLOGY: ICD-10-CM

## 2023-11-01 DIAGNOSIS — N18.4 ANEMIA DUE TO STAGE 4 CHRONIC KIDNEY DISEASE: ICD-10-CM

## 2023-11-01 DIAGNOSIS — C67.8 MALIGNANT NEOPLASM OF OVERLAPPING SITES OF BLADDER (HCC): ICD-10-CM

## 2023-11-01 DIAGNOSIS — D49.4 BLADDER TUMOR: ICD-10-CM

## 2023-11-01 LAB
ALBUMIN SERPL BCP-MCNC: 3.6 G/DL (ref 3.5–5)
ALP SERPL-CCNC: 90 U/L (ref 34–104)
ALT SERPL W P-5'-P-CCNC: 10 U/L (ref 7–52)
ANION GAP SERPL CALCULATED.3IONS-SCNC: 10 MMOL/L
AST SERPL W P-5'-P-CCNC: 11 U/L (ref 13–39)
BASOPHILS # BLD AUTO: 0.05 THOUSANDS/ÂΜL (ref 0–0.1)
BASOPHILS NFR BLD AUTO: 0 % (ref 0–1)
BILIRUB SERPL-MCNC: 0.6 MG/DL (ref 0.2–1)
BUN SERPL-MCNC: 39 MG/DL (ref 5–25)
CALCIUM SERPL-MCNC: 9.4 MG/DL (ref 8.4–10.2)
CHLORIDE SERPL-SCNC: 100 MMOL/L (ref 96–108)
CO2 SERPL-SCNC: 23 MMOL/L (ref 21–32)
CREAT SERPL-MCNC: 2.25 MG/DL (ref 0.6–1.3)
EOSINOPHIL # BLD AUTO: 0.08 THOUSAND/ÂΜL (ref 0–0.61)
EOSINOPHIL NFR BLD AUTO: 1 % (ref 0–6)
ERYTHROCYTE [DISTWIDTH] IN BLOOD BY AUTOMATED COUNT: 15.8 % (ref 11.6–15.1)
GFR SERPL CREATININE-BSD FRML MDRD: 25 ML/MIN/1.73SQ M
GLUCOSE P FAST SERPL-MCNC: 159 MG/DL (ref 65–99)
HCT VFR BLD AUTO: 35.3 % (ref 36.5–49.3)
HGB BLD-MCNC: 11.6 G/DL (ref 12–17)
IMM GRANULOCYTES # BLD AUTO: 0.11 THOUSAND/UL (ref 0–0.2)
IMM GRANULOCYTES NFR BLD AUTO: 1 % (ref 0–2)
LYMPHOCYTES # BLD AUTO: 0.8 THOUSANDS/ÂΜL (ref 0.6–4.47)
LYMPHOCYTES NFR BLD AUTO: 6 % (ref 14–44)
MCH RBC QN AUTO: 30.1 PG (ref 26.8–34.3)
MCHC RBC AUTO-ENTMCNC: 32.9 G/DL (ref 31.4–37.4)
MCV RBC AUTO: 92 FL (ref 82–98)
MONOCYTES # BLD AUTO: 1.26 THOUSAND/ÂΜL (ref 0.17–1.22)
MONOCYTES NFR BLD AUTO: 10 % (ref 4–12)
NEUTROPHILS # BLD AUTO: 10.86 THOUSANDS/ÂΜL (ref 1.85–7.62)
NEUTS SEG NFR BLD AUTO: 82 % (ref 43–75)
NRBC BLD AUTO-RTO: 0 /100 WBCS
PLATELET # BLD AUTO: 241 THOUSANDS/UL (ref 149–390)
PMV BLD AUTO: 12.1 FL (ref 8.9–12.7)
POTASSIUM SERPL-SCNC: 4.5 MMOL/L (ref 3.5–5.3)
PROT SERPL-MCNC: 7.5 G/DL (ref 6.4–8.4)
RBC # BLD AUTO: 3.85 MILLION/UL (ref 3.88–5.62)
SODIUM SERPL-SCNC: 133 MMOL/L (ref 135–147)
WBC # BLD AUTO: 13.16 THOUSAND/UL (ref 4.31–10.16)

## 2023-11-01 PROCEDURE — 80053 COMPREHEN METABOLIC PANEL: CPT

## 2023-11-01 PROCEDURE — 85025 COMPLETE CBC W/AUTO DIFF WBC: CPT

## 2023-11-02 ENCOUNTER — TELEPHONE (OUTPATIENT)
Dept: HEMATOLOGY ONCOLOGY | Facility: CLINIC | Age: 83
End: 2023-11-02

## 2023-11-02 ENCOUNTER — HOME CARE VISIT (OUTPATIENT)
Dept: HOME HEALTH SERVICES | Facility: HOME HEALTHCARE | Age: 83
End: 2023-11-02
Payer: MEDICARE

## 2023-11-02 VITALS
OXYGEN SATURATION: 97 % | SYSTOLIC BLOOD PRESSURE: 120 MMHG | RESPIRATION RATE: 20 BRPM | HEART RATE: 69 BPM | DIASTOLIC BLOOD PRESSURE: 80 MMHG

## 2023-11-02 PROCEDURE — G0151 HHCP-SERV OF PT,EA 15 MIN: HCPCS

## 2023-11-02 NOTE — CASE COMMUNICATION
Informational only. . no response required    Patient discharged from [facility] PT intervention this date.   Recommend continuation of PT as OP

## 2023-11-03 ENCOUNTER — HOSPITAL ENCOUNTER (OUTPATIENT)
Dept: RADIOLOGY | Facility: HOSPITAL | Age: 83
Discharge: HOME | End: 2023-11-03
Payer: MEDICARE

## 2023-11-03 DIAGNOSIS — S06.5XAA SDH (SUBDURAL HEMATOMA) (HCC): ICD-10-CM

## 2023-11-03 PROCEDURE — 70450 CT HEAD/BRAIN W/O DYE: CPT

## 2023-11-03 PROCEDURE — G1004 CDSM NDSC: HCPCS

## 2023-11-06 NOTE — ASSESSMENT & PLAN NOTE
Returns for follow up for multicompartmental hemorrhage, SAH, and SDH   S/p fall backwards with head strike 9/15  No AC/AP medication  Dizziness has resolved. Ongoing balance issues and ambulates with walker. No headache. Exam: right sided self-correcting dysmetria, right eye visual field deficit from macular degeneration, otherwise non-focal.     Imaging:  CT head 11/3/23: 1. Slightly improved hypodensity in the left anterior-inferior frontal lobe representing sequela of recent contusion (gliosis and/or edema). 2.  Grossly stable small hypodense right frontal lobe subdural collection favoring post traumatic hygroma. No significant mass effect or midline shift. 3.  Resolved small right anterior frontal lobe subarachnoid hemorrhage. 4.  Resolved residual layering blood within the posterior horn of the left lateral ventricle. 5. Redemonstrated recent right temporal bone fracture with resolved fluid/blood within the middle ear and decreased fluid/blood in the mastoid air cells. 6.  Chronic ischemic changes and microangiopathy as described    Plan:  Reviewed imaging extensively with patient and wife. Improved areas of hemorrhage with no new hemorrhage seen. The hygroma is stable as are the size of his ventricles. No neurosurgery intervention or further follow up imaging recommended. Reviewed red flag s/s  Follow up as needed. Call with any questions or concerns.

## 2023-11-07 ENCOUNTER — OFFICE VISIT (OUTPATIENT)
Dept: NEUROSURGERY | Facility: CLINIC | Age: 83
End: 2023-11-07
Payer: MEDICARE

## 2023-11-07 VITALS
BODY MASS INDEX: 28.72 KG/M2 | HEIGHT: 67 IN | WEIGHT: 183 LBS | DIASTOLIC BLOOD PRESSURE: 82 MMHG | HEART RATE: 68 BPM | SYSTOLIC BLOOD PRESSURE: 152 MMHG | OXYGEN SATURATION: 95 % | TEMPERATURE: 98.8 F

## 2023-11-07 DIAGNOSIS — S06.5XAA SDH (SUBDURAL HEMATOMA) (HCC): Primary | ICD-10-CM

## 2023-11-07 PROCEDURE — 99214 OFFICE O/P EST MOD 30 MIN: CPT | Performed by: PHYSICIAN ASSISTANT

## 2023-11-07 NOTE — PROGRESS NOTES
Neurosurgery Office Note  Riky Elias 80 y.o. male MRN: [id number]      Assessment/Plan     SDH (subdural hematoma) [facility])  Returns for follow up for multicompartmental hemorrhage, SAH, and SDH   S/p fall backwards with head strike 9/15  No AC/AP medication  Dizziness has resolved. Ongoing balance issues and ambulates with walker. No headache. Exam: right sided self-correcting dysmetria, right eye visual field deficit from macular degeneration, otherwise non-focal.     Imaging:  CT head 11/3/23: 1. Slightly improved hypodensity in the left anterior-inferior frontal lobe representing sequela of recent contusion (gliosis and/or edema). 2.  Grossly stable small hypodense right frontal lobe subdural collection favoring post traumatic hygroma. No significant mass effect or midline shift. 3.  Resolved small right anterior frontal lobe subarachnoid hemorrhage. 4.  Resolved residual layering blood within the posterior horn of the left lateral ventricle. 5. Redemonstrated recent right temporal bone fracture with resolved fluid/blood within the middle ear and decreased fluid/blood in the mastoid air cells. 6.  Chronic ischemic changes and microangiopathy as described    Plan:  Reviewed imaging extensively with patient and wife. Improved areas of hemorrhage with no new hemorrhage seen. The hygroma is stable as are the size of his ventricles. No neurosurgery intervention or further follow up imaging recommended. Reviewed red flag s/s  Follow up as needed. Call with any questions or concerns.       Diagnoses and all orders for this visit:    SDH (subdural hematoma) ([address])    Other orders  -     metoprolol tartrate (LOPRESSOR) 25 mg tablet          I have spent a total time of 30 minutes on 11/07/23 in caring for this patient including Diagnostic results, Instructions for management, Patient and family education, Impressions, Counseling / Coordination of care, Documenting in the medical record, Reviewing / ordering tests, medicine, procedures  , Obtaining or reviewing history  , and Communicating with other healthcare professionals . CHIEF COMPLAINT    Chief Complaint   Patient presents with    Follow-up     4 weeks  CT Head 11/3/23       HISTORY    History of Present Illness     80y.o. year old male     80year old gentleman seen for follow up after multi-compartmental hemorrhage including right temporal bone fracture with small amount of pneumocephalus. Presents today with his wife. He relates that his is doing well. His dizziness has resolved, ongoing issues with balance but ambulates with a walker. Denies headaches, cognitive issues. He has baseline right eye vision issues from macular degeneration. 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 See Discussion    REVIEW OF SYSTEMS    Review of Systems   Constitutional: Negative. Negative for fatigue. HENT:  Positive for tinnitus (both ears right ear bleeding). Eyes:  Visual disturbance: blurry macular degeneration. Macular degeneration blurry vision   Respiratory: Negative. Cardiovascular: Negative. Gastrointestinal:  Positive for constipation. Endocrine: Negative. Genitourinary: Negative. Musculoskeletal:  Positive for gait problem (uses walker history of fallingbackwards unbalanced). Allergic/Immunologic: Negative. Neurological:  Positive for weakness (generalized). Negative for dizziness, tremors, seizures, syncope, speech difficulty, light-headedness, numbness and headaches. Hematological: Negative. Psychiatric/Behavioral:  Negative for confusion, decreased concentration and sleep disturbance. ROS obtained by MA. Reviewed. See HPI.      Meds/Allergies     Current Outpatient Medications   Medication Sig Dispense Refill    acetaminophen (TYLENOL) 325 mg tablet Take 2 tablets (650 mg total) by mouth 3 (three) times a day as needed for mild pain (Patient taking differently: Take 650 mg by mouth if needed for mild pain)  0    amLODIPine (NORVASC) 5 mg tablet Take 1 tablet (5 mg total) by mouth 2 (two) times a day 60 tablet 0    ascorbic acid (VITAMIN C) 500 MG tablet Take 1 tablet (500 mg total) by mouth daily Do not start before September 29, 2023. 30 tablet 0    atorvastatin (LIPITOR) 40 mg tablet TAKE ONE TABLET BY MOUTH AT BEDTIME 90 tablet 3    Blood Glucose Monitoring Suppl (OneTouch Verio Flex System) w/Device KIT Use to check blood sugars daily 1 kit 0    calcitriol (ROCALTROL) 0.25 mcg capsule Take 1 tablet 2 times a week (Monday, Friday) (Patient taking differently: Take 1 tablet 2 times a week (Tuesday, Friday)) 24 capsule 3    citalopram (CeleXA) 20 mg tablet TAKE ONE TABLET BY MOUTH EVERY DAY 30 tablet 3    dapagliflozin (Farxiga) 5 MG TABS Take 1 tablet (5 mg total) by mouth daily 90 tablet 2    ferrous gluconate (FERGON) 324 mg tablet Take 1 tablet (324 mg total) by mouth daily before breakfast Can take ferrous sulfate Do not start before September 29, 2023. 30 tablet 0    fluticasone (FLONASE) 50 mcg/act nasal spray 1 spray into each nostril daily Do not start before September 29, 2023. 9.9 mL 0    folic acid (FOLVITE) 1 mg tablet Take 1 tablet (1 mg total) by mouth daily Do not start before September 29, 2023. 30 tablet 0    hydrALAZINE (APRESOLINE) 10 mg tablet Take 1 tablet (10 mg total) by mouth 3 (three) times a day 90 tablet 0    Lancets (OneTouch Delica Plus [id number]) MISC TEST BLOOD GLUCOSE ONCE DAILY 100 each 0    loratadine (CLARITIN) 10 mg tablet Take 1 tablet (10 mg total) by mouth daily as needed for allergies 30 tablet 0    magnesium oxide (MAG-OX) 400 mg Take 1 tablet (400 mg total) by mouth in the morning.  180 tablet 2    melatonin 3 mg Take 2 tablets (6 mg total) by mouth daily at bedtime  0    metoprolol tartrate (LOPRESSOR) 25 mg tablet       Multiple Vitamins-Minerals (OCUVITE-LUTEIN PO) Take 1 tablet by mouth 2 (two) times a day Pt is taking preservision omeprazole (PriLOSEC) 40 MG capsule TAKE ONE CAPSULE BY MOUTH EVERY MORNING 90 capsule 3    OneTouch Verio test strip TEST ONCE A  strip 0    polyethylene glycol (GLYCOLAX) 17 GM/SCOOP powder Take 17 g by mouth daily as needed (Constipation) Can substitute for closest size available 507 g 0    sodium bicarbonate 650 mg tablet Take 2 tablets (1,300 mg total) by mouth 2 (two) times a day 360 tablet 3    torsemide (DEMADEX) 20 mg tablet Take 1 tablet (20 mg total) by mouth every other day 90 tablet 0    traZODone (DESYREL) 50 mg tablet Take 0.5 tablets (25 mg total) by mouth daily at bedtime as needed for sleep 30 tablet 0     No current facility-administered medications for this visit. Allergies   Allergen Reactions    Fentanyl Hallucinations    Morphine And Related Other (See Comments)     While having an MI patient received morphine and had adverse reaction but doesn't know what happened.        PAST HISTORY    Past Medical History:   Diagnosis Date    Acute kidney injury ([address])     Anemia Jan. 2022    Arthritis     Hands    Wright esophagus     BPH with obstruction/lower urinary tract symptoms     CAD (coronary artery disease)     Last assessed 09/16/15    Cancer ([address])     bladder    Cataract, acquired     Last assessed 10/10/17    Chronic kidney disease     Coronary artery disease     Diabetes mellitus ([address])     NIDDM    Diabetic neuropathy ([address])     Feet    Enlarged prostate with lower urinary tract symptoms (LUTS)     Last assessed 10/10/17    Erectile dysfunction     GERD (gastroesophageal reflux disease)     Last assessed 10/10/17    Hemoptysis     Last assessed 03/14/16    Hypercholesterolemia     Last assessed 10/10/17    Hypertension     Last assessed 10/10/17    Kidney stone     Macular degeneration     Right eye is particularly affected-peripheral vision intact    Myocardial infarction ([address]) 1998    OAB (overactive bladder)     Testicular hypofunction     Testicular hypogonadism     Last assessed 10/10/17    Tinnitus     Umbilical hernia     Last assessed 06/18/14    Urge incontinence of urine     Wears glasses        Past Surgical History:   Procedure Laterality Date    BLADDER SURGERY      COLONOSCOPY      CORONARY ARTERY BYPASS GRAFT  07/16/2014    ABG x 4 LIMA to LAD,SVG to diagnoal 2 SVG to OM-1, SVG to PDA, resection of partial plerual mass    CT GUIDED PERC DRAINAGE CATHETER PLACEMENT  02/19/2021    CYSTOSCOPY  2013    CYSTOSCOPY  02/08/2022    [place]    ESOPHAGOGASTRODUODENOSCOPY      FL RETROGRADE PYELOGRAM  12/20/2018    FL RETROGRADE PYELOGRAM  12/05/2019    INGUINAL HERNIA REPAIR Bilateral 2015    IR DRAINAGE TUBE CHECK AND/OR REMOVAL  03/05/2021    PHOTODYNAMIC THERAPY      For Barretts esophagus    [place] COLONOSCOPY FLX DX W/COLLJ SPEC WHEN PFRMD N/A 04/25/2016    Procedure: COLONOSCOPY;  Surgeon: Marta Bolanos MD;  Location:  GI LAB; Service: Gastroenterology    [place] CYSTO W/REMOVAL OF LESIONS SMALL N/A 12/05/2019    Procedure: CYSTO W/TURBT AND TRANSURETHRAL PROSTATE BIOPSY AND OPENING OF BLADDER NECK CONTRACTURE, B/L Retrograde pyelogram;  Surgeon: Gay Cardenas MD;  Location: [facility];  Service: Urology    WV CYSTOURETHROSCOPY W/DEST &/RMVL MED BLADDER TY N/A 04/16/2020    Procedure: CYSTOSCOPY, TRANSURETHRAL RESECTION OF BLADDER TUMOR (TURBT);   Surgeon: Gay Cardenas MD;  Location: [facility];  Service: Urology    WV CYSTOURETHROSCOPY WITH BIOPSY N/A 09/10/2020    Procedure: CYSTOSCOPY, COLLECTION OF LEFT KIDNEY CYTOLOGY, BILATERAL RETROGRADE PYELOGRAM, BLADDER WALL BIOPSIES  AND FULGERAION, RANDOM BLADDER TUMOR BIOPSIES;  Surgeon: Gay Cardenas MD;  Location: [facility] MAIN OR;  Service: Urology    WV CYSTOURETHROSCOPY WITH BIOPSY N/A 04/05/2022    Procedure: urethroscopy , biopsy of urethra with fulgeration;  Surgeon: June Dumas MD;  Location:  MAIN OR;  Service: Urology    PROSTATE SURGERY      TONSILLECTOMY      TRANSURETHRAL RESECTION OF PROSTATE N/A 2018    Procedure: CYSTO, PHOTO SELECTIVE VAPORIZATION OF PROSTATE, B/L RETROGRADE PYELOGRAM, TURBT;  Surgeon: Shara Fonseca MD;  Location: [facility];  Service: Urology    UMBILICAL HERNIA REPAIR  2012    UPPER GASTROINTESTINAL ENDOSCOPY         Social History     Tobacco Use    Smoking status: Former     Packs/day: 1.00     Years: 10.00     Total pack years: 10.00     Types: Cigarettes     Quit date: 1972     Years since quittin.8    Smokeless tobacco: Never    Tobacco comments:     Quit at age 28   Vaping Use    Vaping Use: Never used   Substance Use Topics    Alcohol use: Not Currently     Alcohol/week: 2.0 standard drinks of alcohol     Types: 1 Glasses of wine, 1 Cans of beer per week     Comment: Non since 7/15/2020    Drug use: Never       Family History   Problem Relation Age of Onset    Cancer Mother         Topical oral abrasian caused cancer    Other Mother         Digestive System Complications    Diabetes Father     Heart disease Father     Hypertension Father     Coronary artery disease Father     Hyperlipidemia Father          Above history personally reviewed. EXAM    Vitals:Blood pressure 152/82, pulse 68, temperature 98.8 °F (37.1 °C), temperature source Temporal, height 5' 7" (1.702 m), weight 83 kg (183 lb), SpO2 95 %. ,Body mass index is 28.66 kg/m². Physical Exam  Vitals reviewed. Constitutional:       General: He is awake. Appearance: Normal appearance. HENT:      Head: Normocephalic and atraumatic. Eyes:      Extraocular Movements: EOM normal.      Conjunctiva/sclera: Conjunctivae normal.      Pupils: Pupils are equal, round, and reactive to light. Cardiovascular:      Rate and Rhythm: Normal rate. Pulmonary:      Effort: Pulmonary effort is normal.   Skin:     General: Skin is warm and dry. Neurological:      Mental Status: He is alert and oriented to person, place, and time.       Motor: Motor strength is normal.     Coordination: Heel to [facility] Test normal. Finger-nose-finger test: self correcting dysmetria on the right. Deep Tendon Reflexes:      Reflex Scores:       Bicep reflexes are 2+ on the right side and 2+ on the left side. Brachioradialis reflexes are 2+ on the right side and 2+ on the left side. Patellar reflexes are 2+ on the right side and 2+ on the left side. Achilles reflexes are 2+ on the right side and 2+ on the left side. Psychiatric:         Attention and Perception: Attention and perception normal.         Mood and Affect: Mood and affect normal.         Speech: Speech normal.         Behavior: Behavior normal. Behavior is cooperative. Thought Content: Thought content normal.         Cognition and Memory: Cognition and memory normal.         Judgment: Judgment normal.         Neurologic Exam     Mental Status   Oriented to person, place, and time. Oriented to month. Follows 2 step commands. Attention: normal. Concentration: normal.   Speech: speech is normal   Level of consciousness: alert  Knowledge: good. Able to perform simple calculations. Able to name object. Normal comprehension. Cranial Nerves     CN II   Right visual field deficit: loss of central vision from macular degeneration. Left visual field deficit: none     CN III, IV, VI   Pupils are equal, round, and reactive to light. Extraocular motions are normal.   Right pupil: Shape: regular. Reactivity: brisk. Consensual response: intact. Left pupil: Shape: regular. Reactivity: brisk. Consensual response: intact. CN III: no CN III palsy  CN VI: no CN VI palsy  Nystagmus: none   Ophthalmoparesis: none  Upgaze: normal  Downgaze: normal  Conjugate gaze: present    CN V   Facial sensation intact. CN VII   Facial expression full, symmetric.      CN VIII   Hearing: intact    CN XI   Right trapezius strength: normal  Left trapezius strength: normal    CN XII   CN XII normal.     Motor Exam   Muscle bulk: normal  Overall muscle tone: normal  Right arm pronator drift: absent  Left arm pronator drift: absent    Strength   Strength 5/5 throughout. Sensory Exam   Light touch normal.     Gait, Coordination, and Reflexes     Coordination   Finger-nose-finger test: self correcting dysmetria on the right. Heel to shin coordination: normal    Tremor   Resting tremor: absent  Intention tremor: absent  Action tremor: absent    Reflexes   Right brachioradialis: 2+  Left brachioradialis: 2+  Right biceps: 2+  Left biceps: 2+  Right patellar: 2+  Left patellar: 2+  Right achilles: 2+  Left achilles: 2+  Right Hightower: absent  Left Hightower: absent  Right ankle clonus: absent  Left ankle clonus: absent  Ambulates with walker          MEDICAL DECISION MAKING    Imaging Studies:     CT head wo contrast    Result Date: 11/6/2023  Narrative: CT BRAIN - WITHOUT CONTRAST INDICATION:   S06. 5XAA: Traumatic subdural hemorrhage with loss of consciousness status unknown, initial encounter. COMPARISON: Head CT 9/29/2023. TECHNIQUE:  CT examination of the brain was performed. Multiplanar 2D reformatted images were created from the source data. Radiation dose length product (DLP) for this visit:  902.19 mGy-cm . This examination, like all CT scans performed in the [facility], was performed utilizing techniques to minimize radiation dose exposure, including the use of iterative  reconstruction and automated exposure control. IMAGE QUALITY:  Diagnostic. FINDINGS: PARENCHYMA: Resolved small right anterior frontal lobe subarachnoid hemorrhage. Slightly improved hypodensity in the left anterior-inferior frontal lobe representing sequela of recent contusion (gliosis and/or edema) seen on series 2 images 19-21. Cerebral atrophy. Stable chronic right anterior gangliocapsular infarct. Stable chronic right cerebellar lacunar infarcts.  Nonspecific  decreased attenuation involving periventricular and subcortical white matter, most compatible with mild microangiopathic change. VENTRICLES AND EXTRA-AXIAL SPACES: Resolved residual layering blood within the left posterior horn. Stable ventricular size. Grossly stable hypotense right frontal lobe subdural collection measuring up to 0.8 cm favoring hygroma. VISUALIZED ORBITS: Normal visualized orbits. PARANASAL SINUSES: Normal visualized paranasal sinuses. CALVARIUM AND EXTRACRANIAL SOFT TISSUES: Redemonstrated recent right temporal bone fracture involving mastoid, squamosal, and tympanic portions. There is resolved the fluid/blood within the right middle ear and improved fluid/blood within the right mastoid air cells. Impression: 1. Slightly improved hypodensity in the left anterior-inferior frontal lobe representing sequela of recent contusion (gliosis and/or edema). 2.  Grossly stable small hypodense right frontal lobe subdural collection favoring post traumatic hygroma. No significant mass effect or midline shift. 3.  Resolved small right anterior frontal lobe subarachnoid hemorrhage. 4.  Resolved residual layering blood within the posterior horn of the left lateral ventricle. 5.  Redemonstrated recent right temporal bone fracture with resolved fluid/blood within the middle ear and decreased fluid/blood in the mastoid air cells. 6.  Chronic ischemic changes and microangiopathy as described. Workstation performed: [id number]       I have personally reviewed pertinent reports.    and I have personally reviewed pertinent films in PACS

## 2023-11-10 ENCOUNTER — OFFICE VISIT (OUTPATIENT)
Dept: INTERNAL MEDICINE CLINIC | Facility: CLINIC | Age: 83
End: 2023-11-10
Payer: MEDICARE

## 2023-11-10 VITALS
OXYGEN SATURATION: 94 % | TEMPERATURE: 97.3 F | HEIGHT: 67 IN | BODY MASS INDEX: 28.72 KG/M2 | DIASTOLIC BLOOD PRESSURE: 80 MMHG | SYSTOLIC BLOOD PRESSURE: 128 MMHG | HEART RATE: 74 BPM | WEIGHT: 183 LBS

## 2023-11-10 DIAGNOSIS — E11.3293 TYPE 2 DIABETES MELLITUS WITH BOTH EYES AFFECTED BY MILD NONPROLIFERATIVE RETINOPATHY WITHOUT MACULAR EDEMA, WITHOUT LONG-TERM CURRENT USE OF INSULIN (HCC): ICD-10-CM

## 2023-11-10 DIAGNOSIS — L72.3 SEBACEOUS CYST OF AXILLA: Primary | ICD-10-CM

## 2023-11-10 DIAGNOSIS — N18.4 STAGE 4 CHRONIC KIDNEY DISEASE (HCC): ICD-10-CM

## 2023-11-10 PROCEDURE — 99214 OFFICE O/P EST MOD 30 MIN: CPT | Performed by: INTERNAL MEDICINE

## 2023-11-10 RX ORDER — CEPHALEXIN 500 MG/1
500 CAPSULE ORAL 2 TIMES DAILY
Qty: 14 CAPSULE | Refills: 0 | Status: SHIPPED | OUTPATIENT
Start: 2023-11-10 | End: 2023-11-17

## 2023-11-10 NOTE — ASSESSMENT & PLAN NOTE
Patient is here for evaluation. He states over the past few months he has had this lump tissue underneath his axillary region on the left. He states this area began to drain some fluid recently and he did show it to his wife who then brought him into the office today for evaluation. Patient has what appears to be an infected sebaceous cyst midline axillary region on the left. It is oozing some purulent material and with some gentle pressure a large amount of cheesy material was evacuated. Patient states the area was only slightly tender to palpation but he does have a combination of a sebaceous cyst and infection. The area did not need incision and drainage but already is oozing material and again a large amount of material was evacuated from this area. Because of probable underlying infection placed on Keflex 500 mg twice a day for 7 days. The wife will be watching this area keeping it clean and dry if not healing to please call for further evaluation and we might consider being seen by a general surgeon.

## 2023-11-10 NOTE — ASSESSMENT & PLAN NOTE
Lab Results   Component Value Date    EGFR 25 11/01/2023    EGFR 27 10/13/2023    EGFR 24 09/28/2023    CREATININE 2.25 (H) 11/01/2023    CREATININE 2.12 (H) 10/13/2023    CREATININE 2.38 (H) 09/28/2023   Continues to follow-up with his specialists including nephrology. His GFR is stable at 25. They continue to monitor him closely and make changes to medication in order to control his blood pressure closely without any further decline in his kidney function.

## 2023-11-10 NOTE — ASSESSMENT & PLAN NOTE
Longstanding history of diabetes mellitus type 2. Last hemoglobin A1c shows adequate control at 6.5.   Patient remains on Roberta Platts and continues to follow-up with his endocrinologist.  He is up-to-date with eye exam and does see a podiatrist  Lab Results   Component Value Date    HGBA1C 6.5 (H) 10/13/2023

## 2023-11-10 NOTE — PROGRESS NOTES
Name: Kristan Loera      : 1940      MRN: [id number]  Encounter Provider: Vini Novak DO  Encounter Date: 11/10/2023   Encounter department: [facility]    Assessment & Plan     1. Sebaceous cyst of axilla  Assessment & Plan:  Patient is here for evaluation. He states over the past few months he has had this lump tissue underneath his axillary region on the left. He states this area began to drain some fluid recently and he did show it to his wife who then brought him into the office today for evaluation. Patient has what appears to be an infected sebaceous cyst midline axillary region on the left. It is oozing some purulent material and with some gentle pressure a large amount of cheesy material was evacuated. Patient states the area was only slightly tender to palpation but he does have a combination of a sebaceous cyst and infection. The area did not need incision and drainage but already is oozing material and again a large amount of material was evacuated from this area. Because of probable underlying infection placed on Keflex 500 mg twice a day for 7 days. The wife will be watching this area keeping it clean and dry if not healing to please call for further evaluation and we might consider being seen by a general surgeon. Orders:  -     cephalexin (KEFLEX) 500 mg capsule; Take 1 capsule (500 mg total) by mouth 2 (two) times a day for 7 days    2. Stage 4 chronic kidney disease [facility])  Assessment & Plan:  Lab Results   Component Value Date    EGFR 25 2023    EGFR 27 10/13/2023    EGFR 24 2023    CREATININE 2.25 (H) 2023    CREATININE 2.12 (H) 10/13/2023    CREATININE 2.38 (H) 2023   Continues to follow-up with his specialists including nephrology. His GFR is stable at 25.   They continue to monitor him closely and make changes to medication in order to control his blood pressure closely without any further decline in his kidney function. 3. Type 2 diabetes mellitus with both eyes affected by mild nonproliferative retinopathy without macular edema, without long-term current use of insulin ([facility])  Assessment & Plan:  Longstanding history of diabetes mellitus type 2. Last hemoglobin A1c shows adequate control at 6.5. Patient remains on Elizabeth and continues to follow-up with his endocrinologist.  He is up-to-date with eye exam and does see a podiatrist  Lab Results   Component Value Date    HGBA1C 6.5 (H) 10/13/2023                Subjective     Patient is a 51-year-old male history of extensive medical problems as noted previously who is here today for acute evaluation. Apparently patient is having a lesion to the axillary region on the left-hand side. He states has been there for numerous months but seems to have gotten enlarged and has begun to drain some fluid. His wife did evaluate the area and brought him here today for consideration. Patient has just finished rehab and is feeling stronger and doing better at home. Continues to follow-up with numerous specialists  Review of Systems   Constitutional:  Positive for activity change and fatigue. Negative for appetite change, chills, diaphoresis, fever and unexpected weight change. Slowly increasing activity level is now trying to walk with a cane but still has poor endurance. Still some fatigability   HENT: Negative. Eyes: Negative. Respiratory: Negative. Cardiovascular: Negative. Gastrointestinal: Negative. Endocrine: Negative. Musculoskeletal: Negative. Diffuse arthritis but nothing acute   Skin:  Positive for wound. Negative for color change, pallor and rash. Wound to axillary region on the left-hand side oozing some purulent fluid patient states nontender   Allergic/Immunologic: Negative. Neurological:  Positive for weakness and numbness.  Negative for dizziness, tremors, seizures, syncope, facial asymmetry, speech difficulty, light-headedness and headaches. History of imbalance with ambulation, peripheral neuropathy, diffuse weakness especially lower extremities but apparently has had some improvement with rehab   Hematological: Negative. Psychiatric/Behavioral: Negative.        Past Medical History:   Diagnosis Date   • Acute kidney injury ([address])    • Anemia Jan. 2022   • Arthritis     Hands   • Wright esophagus    • BPH with obstruction/lower urinary tract symptoms    • CAD (coronary artery disease)     Last assessed 09/16/15   • Cancer ([address])     bladder   • Cataract, acquired     Last assessed 10/10/17   • Chronic kidney disease    • Coronary artery disease    • Diabetes mellitus ([address])     NIDDM   • Diabetic neuropathy ([address])     Feet   • Enlarged prostate with lower urinary tract symptoms (LUTS)     Last assessed 10/10/17   • Erectile dysfunction    • GERD (gastroesophageal reflux disease)     Last assessed 10/10/17   • Hemoptysis     Last assessed 03/14/16   • Hypercholesterolemia     Last assessed 10/10/17   • Hypertension     Last assessed 10/10/17   • Kidney stone    • Macular degeneration     Right eye is particularly affected-peripheral vision intact   • Myocardial infarction [facility]) 1998   • OAB (overactive bladder)    • Testicular hypofunction    • Testicular hypogonadism     Last assessed 10/10/17   • Tinnitus    • Umbilical hernia     Last assessed 06/18/14   • Urge incontinence of urine    • Wears glasses      Past Surgical History:   Procedure Laterality Date   • BLADDER SURGERY     • COLONOSCOPY     • CORONARY ARTERY BYPASS GRAFT  07/16/2014    ABG x 4 LIMA to LAD,SVG to diagnoal 2 SVG to OM-1, SVG to PDA, resection of partial plerual mass   • CT GUIDED [facility] DRAINAGE CATHETER PLACEMENT  02/19/2021   • CYSTOSCOPY  2013   • CYSTOSCOPY  02/08/2022    [facility]   • ESOPHAGOGASTRODUODENOSCOPY     • FL RETROGRADE PYELOGRAM  12/20/2018   • FL RETROGRADE PYELOGRAM  12/05/2019   • INGUINAL HERNIA REPAIR Bilateral 2015   • IR DRAINAGE TUBE CHECK AND/OR REMOVAL  03/05/2021   • PHOTODYNAMIC THERAPY      For Barretts esophagus   • MO COLONOSCOPY FLX DX W/COLLJ SPEC WHEN PFRMD N/A 04/25/2016    Procedure: COLONOSCOPY;  Surgeon: Bryon Santos MD;  Location:  GI LAB; Service: Gastroenterology   • MO CYSTO W/REMOVAL OF LESIONS SMALL N/A 12/05/2019    Procedure: CYSTO W/TURBT AND TRANSURETHRAL PROSTATE BIOPSY AND OPENING OF BLADDER NECK CONTRACTURE, B/L Retrograde pyelogram;  Surgeon: Jaycob Oates MD;  Location: [facility];  Service: Urology   • MO CYSTOURETHROSCOPY W/DEST &/RMVL MED BLADDER TY N/A 04/16/2020    Procedure: CYSTOSCOPY, TRANSURETHRAL RESECTION OF BLADDER TUMOR (TURBT);   Surgeon: Jaycob Oates MD;  [facility];  Service: Urology   • MO CYSTOURETHROSCOPY WITH BIOPSY N/A 09/10/2020    Procedure: CYSTOSCOPY, COLLECTION OF LEFT KIDNEY CYTOLOGY, BILATERAL RETROGRADE PYELOGRAM, BLADDER WALL BIOPSIES  AND FULGERAION, RANDOM BLADDER TUMOR BIOPSIES;  Surgeon: Jaycob Oates MD;  Location: [address] [place] [address] MAIN OR;  Service: Urology   • MO CYSTOURETHROSCOPY WITH BIOPSY N/A 04/05/2022    Procedure: urethroscopy , biopsy of urethra with fulgeration;  Surgeon: Pavel Tanner MD;  Location:  MAIN OR;  Service: Urology   • PROSTATE SURGERY     • TONSILLECTOMY     • TRANSURETHRAL RESECTION OF PROSTATE N/A 12/20/2018    Procedure: CYSTO, PHOTO SELECTIVE VAPORIZATION OF PROSTATE, B/L RETROGRADE PYELOGRAM, TURBT;  Surgeon: Jaycob Oates MD;  Location: [facility];  Service: Urology   • UMBILICAL HERNIA REPAIR  2012   • UPPER GASTROINTESTINAL ENDOSCOPY       Family History   Problem Relation Age of Onset   • Cancer Mother         Topical oral abrasian caused cancer   • Other Mother         Digestive System Complications   • Diabetes Father    • Heart disease Father    • Hypertension Father    • Coronary artery disease Father    • Hyperlipidemia Father      Social History     Socioeconomic History   • Marital status: /Civil Union     Spouse name: None   • Number of children: None   • Years of education: None   • Highest education level: None   Occupational History   • Occupation: Sales position   Tobacco Use   • Smoking status: Former     Packs/day: 1.00     Years: 10.00     Total pack years: 10.00     Types: Cigarettes     Quit date: 1972     Years since quittin.8   • Smokeless tobacco: Never   • Tobacco comments:     Quit at age 28   Vaping Use   • Vaping Use: Never used   Substance and Sexual Activity   • Alcohol use: Not Currently     Alcohol/week: 2.0 standard drinks of alcohol     Types: 1 Glasses of wine, 1 Cans of beer per week     Comment: Non since 7/15/2020   • Drug use: Never   • Sexual activity: Not Currently     Partners: Female   Other Topics Concern   • None   Social History Narrative    Always uses seatbelt        Caffeine use- Drinks 2 cups of coffee daily     Social Determinants of Health     Financial Resource Strain: Low Risk  (2022)    Overall Financial Resource Strain (CARDIA)    • Difficulty of Paying Living Expenses: Not very hard   Food Insecurity: No Food Insecurity (2023)    Hunger Vital Sign    • Worried About Running Out of Food in the Last Year: Never true    • Ran Out of Food in the Last Year: Never true   Transportation Needs: No Transportation Needs (2023)    PRAPARE - Transportation    • Lack of Transportation (Medical): No    • Lack of Transportation (Non-Medical):  No   Physical Activity: Not on file   Stress: Not on file   Social Connections: Not on file   Intimate Partner Violence: Not on file   Housing Stability: Low Risk  (2023)    Housing Stability Vital Sign    • Unable to Pay for Housing in the Last Year: No    • Number of Places Lived in the Last Year: 1    • Unstable Housing in the Last Year: No     Current Outpatient Medications on File Prior to Visit   Medication Sig   • acetaminophen (TYLENOL) 325 mg tablet Take 2 tablets (650 mg total) by mouth 3 (three) times a day as needed for mild pain (Patient taking differently: Take 650 mg by mouth if needed for mild pain)   • amLODIPine (NORVASC) 5 mg tablet Take 1 tablet (5 mg total) by mouth 2 (two) times a day   • ascorbic acid (VITAMIN C) 500 MG tablet Take 1 tablet (500 mg total) by mouth daily Do not start before September 29, 2023. • atorvastatin (LIPITOR) 40 mg tablet TAKE ONE TABLET BY MOUTH AT BEDTIME   • Blood Glucose Monitoring Suppl (OneTouch Verio Flex System) w/Device KIT Use to check blood sugars daily   • calcitriol (ROCALTROL) 0.25 mcg capsule Take 1 tablet 2 times a week (Monday, Friday) (Patient taking differently: Take 1 tablet 2 times a week (Tuesday, Friday))   • citalopram (CeleXA) 20 mg tablet TAKE ONE TABLET BY MOUTH EVERY DAY   • dapagliflozin (Farxiga) 5 MG TABS Take 1 tablet (5 mg total) by mouth daily   • ferrous gluconate (FERGON) 324 mg tablet Take 1 tablet (324 mg total) by mouth daily before breakfast Can take ferrous sulfate Do not start before September 29, 2023. • fluticasone (FLONASE) 50 mcg/act nasal spray 1 spray into each nostril daily Do not start before September 29, 2023. • folic acid (FOLVITE) 1 mg tablet Take 1 tablet (1 mg total) by mouth daily Do not start before September 29, 2023. • hydrALAZINE (APRESOLINE) 10 mg tablet Take 1 tablet (10 mg total) by mouth 3 (three) times a day   • Lancets (OneTouch Delica Plus [id number]) MISC TEST BLOOD GLUCOSE ONCE DAILY   • loratadine (CLARITIN) 10 mg tablet Take 1 tablet (10 mg total) by mouth daily as needed for allergies   • magnesium oxide (MAG-OX) 400 mg Take 1 tablet (400 mg total) by mouth in the morning.    • melatonin 3 mg Take 2 tablets (6 mg total) by mouth daily at bedtime   • metoprolol tartrate (LOPRESSOR) 25 mg tablet    • Multiple Vitamins-Minerals (OCUVITE-LUTEIN PO) Take 1 tablet by mouth 2 (two) times a day Pt is taking preservision    • omeprazole (PriLOSEC) 40 MG capsule TAKE ONE CAPSULE BY MOUTH EVERY MORNING • OneTouch Verio test strip TEST ONCE A DAY   • polyethylene glycol (GLYCOLAX) 17 GM/SCOOP powder Take 17 g by mouth daily as needed (Constipation) Can substitute for closest size available   • sodium bicarbonate 650 mg tablet Take 2 tablets (1,300 mg total) by mouth 2 (two) times a day   • torsemide (DEMADEX) 20 mg tablet Take 1 tablet (20 mg total) by mouth every other day   • traZODone (DESYREL) 50 mg tablet Take 0.5 tablets (25 mg total) by mouth daily at bedtime as needed for sleep     Allergies   Allergen Reactions   • Fentanyl Hallucinations   • Morphine And Related Other (See Comments)     While having an MI patient received morphine and had adverse reaction but doesn't know what happened. Immunization History   Administered Date(s) Administered   • COVID-19 MODERNA VACC 0.5 ML IM 01/18/2021, 02/15/2021   • Influenza Split High Dose Preservative Free IM 10/10/2013, 09/22/2014, 10/11/2016   • Influenza, high dose seasonal 0.7 mL 01/22/2019, 10/08/2019, 09/03/2020, 11/23/2021, 11/29/2022   • Pneumococcal Conjugate 13-Valent 05/23/2019   • Pneumococcal Polysaccharide PPV23 03/11/2014   • Tdap 09/15/2023, 09/15/2023       Objective     /80   Pulse 74   Temp (!) 97.3 °F (36.3 °C)   Ht 5' 7" (1.702 m)   Wt 83 kg (183 lb)   SpO2 94%   BMI 28.66 kg/m²     Physical Exam  Vitals and nursing note reviewed. Constitutional:       General: He is not in acute distress. Appearance: Normal appearance. He is not ill-appearing, toxic-appearing or diaphoretic. Comments: Pleasant slightly pale appearing 80-year-old male who is awake alert accompanied by his wife in no acute distress walking with a cane   HENT:      Head: Normocephalic and atraumatic. Mouth/Throat:      Mouth: Mucous membranes are moist.      Pharynx: Oropharynx is clear. No oropharyngeal exudate or posterior oropharyngeal erythema. Eyes:      General: No scleral icterus. Right eye: No discharge.          Left eye: No discharge. Extraocular Movements: Extraocular movements intact. Conjunctiva/sclera: Conjunctivae normal.      Pupils: Pupils are equal, round, and reactive to light. Cardiovascular:      Rate and Rhythm: Normal rate and regular rhythm. Heart sounds: Normal heart sounds. No friction rub. No gallop. Pulmonary:      Effort: Pulmonary effort is normal. No respiratory distress. Breath sounds: Normal breath sounds. No stridor. No wheezing, rhonchi or rales. Chest:      Chest wall: No tenderness. Abdominal:      General: Bowel sounds are normal.      Palpations: Abdomen is soft. Musculoskeletal:         General: Deformity present. No swelling, tenderness or signs of injury. Cervical back: Normal range of motion. Right lower leg: No edema. Left lower leg: No edema. Comments: As previously noted some generalized wasting to muscle tissue upper and lower extremities but no significant change from previously. Walking with a cane some slight imbalance with gait   Skin:     General: Skin is warm and dry. Findings: Lesion present. Comments: A sebaceous cyst to axillary region on the left midline approximately centimeters in circumference. Patient has a whitish purulent discharge and we were able to express a large amount of cheesy material from the wound. Patient did have some slight discomfort with maneuvers and the fluid expressed then became slightly bloody. The area was cleaned with peroxide dried and Neosporin cream was applied to the area along with a large Band-Aid. Was placed on antibiotics specifically Keflex 500 mg twice a day for 7 days and then the wife will be continuing to keep an eye on the lesion and call if any changes. Neurological:      Mental Status: He is alert and oriented to person, place, and time. Mental status is at baseline.       Comments: Diffuse neurologic changes peripheral neuropathy patient has some visual changes secondary to his diabetes as previously noted. Stocking glove distribution of his neuropathy absent patellar and Achilles tendon reflexes bilaterally   Psychiatric:         Mood and Affect: Mood normal.         Behavior: Behavior normal.         Thought Content:  Thought content normal.         Judgment: Judgment normal.   Dalila Schaeffer, DO

## 2023-11-13 ENCOUNTER — APPOINTMENT (OUTPATIENT)
Dept: LAB | Facility: CLINIC | Age: 83
End: 2023-11-13
Payer: MEDICARE

## 2023-11-13 DIAGNOSIS — N18.4 ANEMIA DUE TO STAGE 4 CHRONIC KIDNEY DISEASE: ICD-10-CM

## 2023-11-13 DIAGNOSIS — C67.5 MALIGNANT NEOPLASM OF URINARY BLADDER NECK (HCC): ICD-10-CM

## 2023-11-13 DIAGNOSIS — C67.8 MALIGNANT NEOPLASM OF OVERLAPPING SITES OF BLADDER (HCC): ICD-10-CM

## 2023-11-13 DIAGNOSIS — D63.1 ANEMIA DUE TO STAGE 4 CHRONIC KIDNEY DISEASE: ICD-10-CM

## 2023-11-13 DIAGNOSIS — R82.89 POSITIVE URINARY CYTOLOGY: ICD-10-CM

## 2023-11-13 DIAGNOSIS — D49.4 BLADDER TUMOR: ICD-10-CM

## 2023-11-13 LAB
ANION GAP SERPL CALCULATED.3IONS-SCNC: 12 MMOL/L
BASOPHILS # BLD AUTO: 0.07 THOUSANDS/ÂΜL (ref 0–0.1)
BASOPHILS NFR BLD AUTO: 1 % (ref 0–1)
BUN SERPL-MCNC: 38 MG/DL (ref 5–25)
CALCIUM SERPL-MCNC: 8.6 MG/DL (ref 8.4–10.2)
CHLORIDE SERPL-SCNC: 101 MMOL/L (ref 96–108)
CO2 SERPL-SCNC: 22 MMOL/L (ref 21–32)
CREAT SERPL-MCNC: 2.41 MG/DL (ref 0.6–1.3)
EOSINOPHIL # BLD AUTO: 0.18 THOUSAND/ÂΜL (ref 0–0.61)
EOSINOPHIL NFR BLD AUTO: 1 % (ref 0–6)
ERYTHROCYTE [DISTWIDTH] IN BLOOD BY AUTOMATED COUNT: 16.2 % (ref 11.6–15.1)
GFR SERPL CREATININE-BSD FRML MDRD: 23 ML/MIN/1.73SQ M
GLUCOSE SERPL-MCNC: 206 MG/DL (ref 65–140)
HCT VFR BLD AUTO: 33.5 % (ref 36.5–49.3)
HGB BLD-MCNC: 10.7 G/DL (ref 12–17)
IMM GRANULOCYTES # BLD AUTO: 0.14 THOUSAND/UL (ref 0–0.2)
IMM GRANULOCYTES NFR BLD AUTO: 1 % (ref 0–2)
LYMPHOCYTES # BLD AUTO: 0.85 THOUSANDS/ÂΜL (ref 0.6–4.47)
LYMPHOCYTES NFR BLD AUTO: 7 % (ref 14–44)
MCH RBC QN AUTO: 29.5 PG (ref 26.8–34.3)
MCHC RBC AUTO-ENTMCNC: 31.9 G/DL (ref 31.4–37.4)
MCV RBC AUTO: 92 FL (ref 82–98)
MONOCYTES # BLD AUTO: 1.03 THOUSAND/ÂΜL (ref 0.17–1.22)
MONOCYTES NFR BLD AUTO: 8 % (ref 4–12)
NEUTROPHILS # BLD AUTO: 10.31 THOUSANDS/ÂΜL (ref 1.85–7.62)
NEUTS SEG NFR BLD AUTO: 82 % (ref 43–75)
NRBC BLD AUTO-RTO: 0 /100 WBCS
PLATELET # BLD AUTO: 288 THOUSANDS/UL (ref 149–390)
PMV BLD AUTO: 11.8 FL (ref 8.9–12.7)
POTASSIUM SERPL-SCNC: 4.5 MMOL/L (ref 3.5–5.3)
RBC # BLD AUTO: 3.63 MILLION/UL (ref 3.88–5.62)
SODIUM SERPL-SCNC: 135 MMOL/L (ref 135–147)
WBC # BLD AUTO: 12.58 THOUSAND/UL (ref 4.31–10.16)

## 2023-11-13 PROCEDURE — 36415 COLL VENOUS BLD VENIPUNCTURE: CPT

## 2023-11-13 PROCEDURE — 80048 BASIC METABOLIC PNL TOTAL CA: CPT

## 2023-11-13 PROCEDURE — 85025 COMPLETE CBC W/AUTO DIFF WBC: CPT

## 2023-11-14 ENCOUNTER — TELEPHONE (OUTPATIENT)
Dept: NEPHROLOGY | Facility: CLINIC | Age: 83
End: 2023-11-14

## 2023-11-14 NOTE — TELEPHONE ENCOUNTER
Recent hospital admission , Status post fall complicated by temporal bone fracture, subdural hematoma and subarachnoid hemorrhage   Pt doing well. Discussed with patient the creatinine trended up to 2.4 on recent blood work but this is still within his baseline so to continue same treatment. Repeat blood work before the office visit in January with advanced practitioner. Patient is off losartan since hospital admission in September currently on amlodipine. Also back on Farxiga as well as torsemide 20 mg every other day.   Patient denies any new complaints, continue same treatment

## 2023-11-15 ENCOUNTER — PATIENT OUTREACH (OUTPATIENT)
Dept: CASE MANAGEMENT | Facility: OTHER | Age: 83
End: 2023-11-15

## 2023-11-15 NOTE — PROGRESS NOTES
Called the patient. He completed VNA services & is considering outpatient PT. He will either call to schedule when he is ready or talk to his PCP. He had a neurosurgery follow up & was cleared by them to follow up as needed. He saw his PCP for a sebaceous cyst & was placed on Keflex x 1 week. Patient has no questions or needs at this time & will talk to his PCP if he does. Patient closed to care management at this time. 15-May-2023 19:24

## 2023-11-16 ENCOUNTER — OFFICE VISIT (OUTPATIENT)
Dept: HEMATOLOGY ONCOLOGY | Facility: CLINIC | Age: 83
End: 2023-11-16
Payer: MEDICARE

## 2023-11-16 VITALS
BODY MASS INDEX: 28.03 KG/M2 | OXYGEN SATURATION: 97 % | DIASTOLIC BLOOD PRESSURE: 78 MMHG | RESPIRATION RATE: 17 BRPM | WEIGHT: 178.6 LBS | SYSTOLIC BLOOD PRESSURE: 120 MMHG | HEART RATE: 65 BPM | TEMPERATURE: 97.6 F | HEIGHT: 67 IN

## 2023-11-16 DIAGNOSIS — C67.8 MALIGNANT NEOPLASM OF OVERLAPPING SITES OF BLADDER (HCC): Primary | ICD-10-CM

## 2023-11-16 PROCEDURE — 99214 OFFICE O/P EST MOD 30 MIN: CPT | Performed by: INTERNAL MEDICINE

## 2023-11-16 NOTE — PROGRESS NOTES
Hematology Outpatient Follow - Up Note  Nora Cook 80 y.o. 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 Cystoscopy on 04/2020 showed carcinoma in situ no evidence of invasive component     He received Pembrolizumab 200 mg flat dose every 3 weeks on 05/26/2020. After 2 cycles he had grade 3 elevation of the liver enzymes, thickening of the skin of the forearms consistent with toxicity to Pembrolizumab     Status post cystoprostatectomy with urinary diversion to an ileal conduit on 10/26/2020        2. 5/2022 pet/ct-  recurrent disease in the right iliac, pelvic area with extension into the aortic bifurcation      Evaluated by Dr. Sophia Lesches at [facility], urethectomy discussed but ultimately not a surgical candidate. 9/2022 pelvic MRI - Multiple suspicious and indeterminate left common, external, and internal iliac lymph nodes up to the aortoiliac bifurcation. Retroperitoneal lymphatic chain is not imaged in entirety. Indeterminate right common iliac lymph nodes. Suspicious lymph node or implant about the left obturator internus. Small pelvic peritoneal fluid of uncertain nature     Initiated on carboplatin/gemcitabine on 09/30/2022. CT scan on December 2022 showed no evidence of disease in the abdomen or pelvis     Therapy was complicated with anemia requiring dose reductions and Aranesp which had been beneficial.       3.  CKD. Cr 2.2-3.3 since 1/2021. Follows with Dr. Katerine Mortensen.       4.  CT scan on March 2023 a showed mesenteric lymphadenopathy stable, right iliac lymphadenopathy stable, no new lesions this might be reactive versus residual disease. To distinguish;  PET scan 5/1/23 performed. 1.4 x 1.7 cm metabolically active lesion in the left pelvic sidewall (SUV max 5.0).  In directed retrospect, the lesion is similar in size compared to recent CT examinations dating back to 11/30/2022, but new from an older CT of   10/8/2021. Findings are suspicious for recurrence of disease     Previously noted 1 cm fluid-attenuating lesion in the right pelvic sidewall is not metabolically active on PET. Interval resolution of the previously noted mesenteric lymph node     Therapy changed to avelumab 800 mg flat dose every 2 weeks, proceed for a total of 3 months     CAT scan in July 2023 showed right iliac enlargement of the lymph node as well as aortocaval lymphadenopathy on the right side, we looked at the scan together, this lymph nodes in the size between 1.2-1.4 cm    Status post fall with subdural hematoma, CAT scan showed peritoneal carcinomatosis and increase in the abdomen lymphadenopathy    We discussed about palliative care versus subsequent therapy with Padcev every other week, at this time the patient and his family decided to wait till after the holidays and will do the CAT scan and we will proceed from there        Labs and imaging studies are reviewed by ordering provider once results are available. If there are findings that need immediate attention, you will be contacted when results available. Discussing results and the implication on your healthcare is best discussed in person at your follow-up visit.        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 Status post radical cystoprostatectomy to ileal conduit urinary diversion on 10/26/2020     Urethral Biopsy 4/5/22  A. Urethra (biopsy):  - High grade, non-invasive papillary urothelial carcinoma   - No muscularis propria identified     9/13/22 CT scan abdomen at [address]- Few mildly enlarged mesenteric and retroperitoneal lymph nodes. Scattered small nodules at the lung bases, favored to be benign. Possibility of urethrectomy discussed. Ultimately, he was not a candidate. Represented to our office 9/20/22.       9/30/22 Carboplatin AUC 4 day 1, gemcitabine 1000 mg per m2 on days 1 and 8 every 21 days initiated.        Treatment changed to gemcitabine 800 mg per m2, carboplatin AUC 4 q 2 weeks with cycle 5 9/45/45  complicated with anemia requiring holding chemotherapy in December 2022      CAT scan in December 2022 showed no evidence of lymphadenopathy in the abdomen or pelvis     He continued gemcitabine/carboplatin and Aranesp 200 mcg every other week for anemia induced by chemotherapy; finished a total of 6 cycles     CAT scan in March 2023 showed stable mesenteric lymphadenopathy, right iliac lymphadenopathy might be reactive or consistent with metastatic disease     PET scan 1/5/2023 showed 1.7 x 1.4 cm metabolically active lesion in the left pelvic sidewall similar in size complaint to the previous CAT scan dated 1/11/2022 but the new from the old of CAT scans on 10/1/2021 suspicious for recurrence of disease     He is being treated with avelumab 800 mg flat dose every 2 weeks      Molecular tests:     TERT, ARID1A mutation     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 5XAA: Traumatic subdural hemorrhage with loss of consciousness status unknown, initial encounter. COMPARISON: Head CT 9/29/2023. TECHNIQUE:  CT examination of the brain was performed. Multiplanar 2D reformatted images were created from the source data. Radiation dose length product (DLP) for this visit:  902.19 mGy-cm . This examination, like all CT scans performed in the [facility], was performed utilizing techniques to minimize radiation dose exposure, including the use of iterative  reconstruction and automated exposure control. IMAGE QUALITY:  Diagnostic. FINDINGS: PARENCHYMA: Resolved small right anterior frontal lobe subarachnoid hemorrhage. Slightly improved hypodensity in the left anterior-inferior frontal lobe representing sequela of recent contusion (gliosis and/or edema) seen on series 2 images 19-21. Cerebral atrophy. Stable chronic right anterior gangliocapsular infarct. Stable chronic right cerebellar lacunar infarcts. Nonspecific  decreased attenuation involving periventricular and subcortical white matter, most compatible with mild microangiopathic change. VENTRICLES AND EXTRA-AXIAL SPACES: Resolved residual layering blood within the left posterior horn. Stable ventricular size. Grossly stable hypotense right frontal lobe subdural collection measuring up to 0.8 cm favoring hygroma. VISUALIZED ORBITS: Normal visualized orbits. PARANASAL SINUSES: Normal visualized paranasal sinuses. CALVARIUM AND EXTRACRANIAL SOFT TISSUES: Redemonstrated recent right temporal bone fracture involving mastoid, squamosal, and tympanic portions. There is resolved the fluid/blood within the right middle ear and improved fluid/blood within the right mastoid air cells. Impression: 1. Slightly improved hypodensity in the left anterior-inferior frontal lobe representing sequela of recent contusion (gliosis and/or edema). 2.  Grossly stable small hypodense right frontal lobe subdural collection favoring post traumatic hygroma. No significant mass effect or midline shift. 3.  Resolved small right anterior frontal lobe subarachnoid hemorrhage. 4.  Resolved residual layering blood within the posterior horn of the left lateral ventricle. 5.  Redemonstrated recent right temporal bone fracture with resolved fluid/blood within the middle ear and decreased fluid/blood in the mastoid air cells. 6.  Chronic ischemic changes and microangiopathy as described.  Workstation performed: [id number]       Labs:   Lab Results   Component Value Date    WBC 12.58 (H) 11/13/2023    HGB 10.7 (L) 11/13/2023    HCT 33.5 (L) 11/13/2023    MCV 92 11/13/2023     11/13/2023     Lab Results   Component Value Date     08/13/2015    K 4.5 11/13/2023     11/13/2023    CO2 22 11/13/2023    ANIONGAP 10 08/13/2015    BUN 38 (H) 11/13/2023    CREATININE 2.41 (H) 11/13/2023    GLUCOSE 128 08/13/2015    GLUF 159 (H) 11/01/2023    CALCIUM 8.6 11/13/2023    CORRECTEDCA 9.0 09/15/2023    AST 11 (L) 11/01/2023    ALT 10 11/01/2023    ALKPHOS 90 11/01/2023    PROT 6.8 08/13/2015    BILITOT 0.58 08/13/2015    EGFR 23 11/13/2023       Lab Results   Component Value Date    IRON 30 (L) 09/20/2023    TIBC 215 (L) 09/20/2023    FERRITIN 131 09/20/2023       Lab Results   Component Value Date    ZTEYMSHT71 1,238 (H) 09/20/2023         ROS: Review of Systems   Constitutional:  Positive for fatigue. Negative for appetite change, chills, diaphoresis, fever and unexpected weight change. HENT:   Negative for hearing loss, lump/mass, mouth sores, nosebleeds, sore throat, trouble swallowing and voice change. Eyes: Negative. Negative for eye problems and icterus. Respiratory:  Positive for shortness of breath. Negative for chest tightness, cough and hemoptysis. Cardiovascular:  Negative for chest pain and leg swelling. Gastrointestinal:  Negative for abdominal distention, abdominal pain, blood in stool, constipation, diarrhea and nausea. Endocrine: Negative. Genitourinary:  Negative for dysuria, frequency, hematuria and pelvic pain. Musculoskeletal: Negative. Negative for arthralgias, back pain, flank pain, gait problem, myalgias and neck stiffness. Skin:  Negative for itching and rash. Neurological:  Negative for dizziness, gait problem, headaches, light-headedness, numbness and speech difficulty. Hematological:  Negative for adenopathy. Does not bruise/bleed easily. Psychiatric/Behavioral:  Negative for confusion, decreased concentration, depression and sleep disturbance. The patient is not nervous/anxious. Current Medications: Reviewed  Allergies: Reviewed  PMH/FH/SH:  Reviewed      Physical Exam:    Body surface area is 1.93 meters squared. Wt Readings from Last 3 Encounters:   11/16/23 81 kg (178 lb 9.6 oz)   11/10/23 83 kg (183 lb)   11/07/23 83 kg (183 lb)        Temp Readings from Last 3 Encounters:   11/16/23 97.6 °F (36.4 °C) (Temporal)   11/10/23 (!) 97.3 °F (36.3 °C)   11/07/23 98.8 °F (37.1 °C) (Temporal)        BP Readings from Last 3 Encounters:   11/16/23 120/78   11/10/23 128/80   11/07/23 152/82         Pulse Readings from Last 3 Encounters:   11/16/23 65   11/10/23 74   11/07/23 68        Physical Exam  Vitals reviewed. Constitutional:       General: He is not in acute distress.      Appearance: He is well-developed. He is not diaphoretic. HENT:      Head: Normocephalic and atraumatic. Eyes:      Conjunctiva/sclera: Conjunctivae normal.   Neck:      Trachea: No tracheal deviation. Cardiovascular:      Rate and Rhythm: Normal rate and regular rhythm. Heart sounds: Murmur heard. No friction rub. No gallop. Pulmonary:      Effort: Pulmonary effort is normal. No respiratory distress. Breath sounds: No decreased air movement. No decreased breath sounds, wheezing or rales. Chest:      Chest wall: No tenderness. Abdominal:      General: There is no distension. Palpations: Abdomen is soft. There is no hepatomegaly or splenomegaly. Tenderness: There is no abdominal tenderness. Musculoskeletal:      Cervical back: Normal range of motion and neck supple. Right lower leg: Edema present. Left lower leg: Edema present. Lymphadenopathy:      Head:      Right side of head: No submental or submandibular adenopathy. Left side of head: No submental or submandibular adenopathy. Cervical: No cervical adenopathy. Upper Body:      Right upper body: No supraclavicular or axillary adenopathy. Left upper body: No supraclavicular or axillary adenopathy. Lower Body: No right inguinal adenopathy. No left inguinal adenopathy. Skin:     General: Skin is warm and dry. Coloration: Skin is not pale. Findings: No erythema or rash. Neurological:      General: No focal deficit present. Mental Status: He is alert and oriented to person, place, and time. Psychiatric:         Behavior: Behavior normal.         Thought Content: Thought content normal.         Judgment: Judgment normal.         ECO    Goals and Barriers:  Current Goal: Minimize effects of disease. Barriers: None. Patient's Capacity to Self Care:  Patient is able to self care.     Code Status: @[facility]DESTAT@

## 2023-11-17 ENCOUNTER — FOLLOW UP (OUTPATIENT)
Dept: URBAN - METROPOLITAN AREA CLINIC 6 | Facility: CLINIC | Age: 83
End: 2023-11-17

## 2023-11-17 DIAGNOSIS — H35.3231: ICD-10-CM

## 2023-11-17 DIAGNOSIS — E11.3293: ICD-10-CM

## 2023-11-17 DIAGNOSIS — H25.813: ICD-10-CM

## 2023-11-17 PROCEDURE — PFS EYLEA PFS: Mod: JZ

## 2023-11-17 PROCEDURE — 92014 COMPRE OPH EXAM EST PT 1/>: CPT

## 2023-11-17 PROCEDURE — 92134 CPTRZ OPH DX IMG PST SGM RTA: CPT

## 2023-11-17 PROCEDURE — 67028 INJECTION EYE DRUG: CPT

## 2023-11-17 PROCEDURE — 92202 OPSCPY EXTND ON/MAC DRAW: CPT | Mod: NC

## 2023-11-17 ASSESSMENT — TONOMETRY
OD_IOP_MMHG: 12
OS_IOP_MMHG: 11

## 2023-11-17 ASSESSMENT — VISUAL ACUITY: OS_CC: 20/200

## 2023-12-15 ENCOUNTER — FOLLOW UP (OUTPATIENT)
Dept: URBAN - METROPOLITAN AREA CLINIC 6 | Facility: CLINIC | Age: 83
End: 2023-12-15

## 2023-12-15 DIAGNOSIS — H35.3231: ICD-10-CM

## 2023-12-15 DIAGNOSIS — E11.3293: ICD-10-CM

## 2023-12-15 PROCEDURE — 92014 COMPRE OPH EXAM EST PT 1/>: CPT | Mod: 25

## 2023-12-15 PROCEDURE — PFS EYLEA PFS: Mod: JZ

## 2023-12-15 PROCEDURE — 67028 INJECTION EYE DRUG: CPT

## 2023-12-15 PROCEDURE — 92134 CPTRZ OPH DX IMG PST SGM RTA: CPT

## 2023-12-15 PROCEDURE — 92202 OPSCPY EXTND ON/MAC DRAW: CPT | Mod: 59

## 2023-12-15 ASSESSMENT — TONOMETRY
OD_IOP_MMHG: 14
OS_IOP_MMHG: 10

## 2023-12-15 ASSESSMENT — VISUAL ACUITY: OS_CC: 20/100-1

## 2023-12-22 ENCOUNTER — TELEPHONE (OUTPATIENT)
Dept: HEMATOLOGY ONCOLOGY | Facility: CLINIC | Age: 83
End: 2023-12-22

## 2023-12-22 NOTE — TELEPHONE ENCOUNTER
Appointment Schedule   Who are you speaking with? Patient   If it is not the patient, are they listed on an active communication consent form? Yes   Which provider is the appointment scheduled with? Dr. Peters   At which location is the appointment scheduled for? [place]   When is the appointment scheduled?  Please list date and time 1/30/24   What is the reason for this appointment? provider   Did patient voice understanding of the details of this appointment? Yes   Was the no show policy reviewed with patient? Yes

## 2023-12-26 PROBLEM — R74.01 TRANSAMINITIS: Status: ACTIVE | Noted: 2023-01-01

## 2023-12-26 PROBLEM — R53.1 GENERALIZED WEAKNESS: Status: ACTIVE | Noted: 2023-01-01

## 2023-12-26 PROBLEM — J18.9 PNEUMONIA: Status: ACTIVE | Noted: 2023-01-01

## 2023-12-26 PROBLEM — R79.89 D-DIMER, ELEVATED: Status: ACTIVE | Noted: 2023-01-01

## 2023-12-26 NOTE — ASSESSMENT & PLAN NOTE
"Lab Results   Component Value Date    HGBA1C 6.5 (H) 10/13/2023       No results for input(s): \"POCGLU\" in the last 72 hours.    Blood Sugar Average: Last 72 hrs:  Is on Farxiga as outpatient.  Hold oral hypoglycemic agent.  Start the patient on sliding scale for now.  Patient with poor p.o. intake    "

## 2023-12-26 NOTE — TELEPHONE ENCOUNTER
Patient Call    Who are you speaking with? Spouse    If it is not the patient, are they listed on an active communication consent form? N/A   What is the reason for this call? Pt has been vomiting and unable to eat for a couple. Pt is very weak   Does this require a call back? Yes   If a call back is required, please list best call back number [phone]    If a call back is required, advise that a message will be forwarded to their care team and someone will return their call as soon as possible.   Did you relay this information to the patient? Yes

## 2023-12-26 NOTE — ASSESSMENT & PLAN NOTE
Patient noted to have new onset transaminitis.  No abdominal pain on examination.  CT abdomen and pelvis pending

## 2023-12-26 NOTE — ASSESSMENT & PLAN NOTE
Patient with elevated troponin-[id number]  at the time of presentation.  Cardiology evaluation appreciated  And normal cardiac troponin.  Patient denies any chest pain  Monitor-trending down to [id number]

## 2023-12-26 NOTE — ASSESSMENT & PLAN NOTE
Patient came to the hospital with gradually worsening generalized weakness has been going on for weeks at this point.  Denies any fever.  Patient also with poor p.o. intake.  Likely multifactorial.  PT OT georgina

## 2023-12-26 NOTE — ASSESSMENT & PLAN NOTE
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 CT scan on December 2022 showed no evidence of disease in the abdomen or pelvis   Repeat imaging done recently showed a worsening of the disease process.  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  Patient was scheduled for the repeat CAT scans today.  Will obtain the CAT scan as inpatient.  Patient reported that he may consider no further treatment -but would like to get the CAT scan before making that decision.  May need heme-onc evaluation for further clarification of goals of care

## 2023-12-26 NOTE — ASSESSMENT & PLAN NOTE
Lab Results   Component Value Date    EGFR 15 12/26/2023    EGFR 23 11/13/2023    EGFR 25 11/01/2023    CREATININE 3.52 (H) 12/26/2023    CREATININE 2.41 (H) 11/13/2023    CREATININE 2.25 (H) 11/01/2023   Patient with CKD stage IV.  Baseline creatinine appears to be   2-2.8 per previous nephrology progress note  Creatinine today is 3.52.  Patient with generalized p.o. intake and decreased p.o. intake.  Gentle hydration hold torsemide and monitor creatinine.  Obtain CT abdomen and pelvis given concern for carcinomatosis on previous CT

## 2023-12-26 NOTE — ASSESSMENT & PLAN NOTE
Patient with elevated D-dimer.  Patient also with a worsening malignancy/CKD.    Denies any shortness of breath  Given creatinine of 3.5 will hold of ordering CTA.  Will obtain lower extremity Doppler

## 2023-12-26 NOTE — PLAN OF CARE
Problem: Potential for Falls  Goal: Patient will remain free of falls  Description: INTERVENTIONS:  - Educate patient/family on patient safety including physical limitations  - Instruct patient to call for assistance with activity   - Consult OT/PT to assist with strengthening/mobility   - Keep Call bell within reach  - Keep bed low and locked with side rails adjusted as appropriate  - Keep care items and personal belongings within reach  - Initiate and maintain comfort rounds  - Make Fall Risk Sign visible to staff  - Offer Toileting every 2 Hours, in advance of need  - Initiate/Maintain bed/chair alarm  - Obtain necessary fall risk management equipment.  - Apply yellow socks and bracelet for high fall risk patients  - Consider moving patient to room near nurses station  Outcome: Progressing     Problem: Prexisting or High Potential for Compromised Skin Integrity  Goal: Skin integrity is maintained or improved  Description: INTERVENTIONS:  - Identify patients at risk for skin breakdown  - Assess and monitor skin integrity  - Assess and monitor nutrition and hydration status  - Monitor labs   - Assess for incontinence   - Turn and reposition patient  - Assist with mobility/ambulation  - Relieve pressure over bony prominences  - Avoid friction and shearing  - Provide appropriate hygiene as needed including keeping skin clean and dry  - Evaluate need for skin moisturizer/barrier cream  - Collaborate with interdisciplinary team   - Patient/family teaching  - Consider wound care consult   Outcome: Progressing     Problem: PAIN - ADULT  Goal: Verbalizes/displays adequate comfort level or baseline comfort level  Description: Interventions:  - Encourage patient to monitor pain and request assistance  - Assess pain using appropriate pain scale  - Administer analgesics based on type and severity of pain and evaluate response  - Implement non-pharmacological measures as appropriate and evaluate response  - Notify  physician/advanced practitioner if interventions unsuccessful or patient reports new pain  Outcome: Progressing     Problem: INFECTION - ADULT  Goal: Absence or prevention of progression during hospitalization  Description: INTERVENTIONS:  - Assess and monitor for signs and symptoms of infection  - Monitor lab/diagnostic results  - Monitor all insertion sites, i.e. indwelling lines, tubes, and drains  - [place] appropriate cooling/warming therapies per order  - Administer medications as ordered  - Instruct and encourage patient and family to use good hand hygiene technique  - Identify and instruct in appropriate isolation precautions for identified infection/condition  Outcome: Progressing     Problem: SAFETY ADULT  Goal: Patient will remain free of falls  Description: INTERVENTIONS:  - Educate patient/family on patient safety including physical limitations  - Instruct patient to call for assistance with activity   - Consult OT/PT to assist with strengthening/mobility   - Keep Call bell within reach  - Keep bed low and locked with side rails adjusted as appropriate  - Keep care items and personal belongings within reach  - Initiate and maintain comfort rounds  - Make Fall Risk Sign visible to staff  - Offer Toileting every 2 Hours, in advance of need  - Initiate/Maintain bed/chair alarm  - Obtain necessary fall risk management equipment.  - Apply yellow socks and bracelet for high fall risk patients  - Consider moving patient to room near nurses station  Outcome: Progressing  Goal: Maintain or return to baseline ADL function  Description: INTERVENTIONS:  -  Assess patient's ability to carry out ADLs; assess patient's baseline for ADL function and identify physical deficits which impact ability to perform ADLs (bathing, care of mouth/teeth, toileting, grooming, dressing, etc.)  - Assess/evaluate cause of self-care deficits   - Assess range of motion  - Assess patient's mobility; develop plan if impaired  - Assess  patient's need for assistive devices and provide as appropriate  - Encourage maximum independence but intervene and supervise when necessary  - Involve family in performance of ADLs  - Assess for home care needs following discharge   - Consider OT consult to assist with ADL evaluation and planning for discharge  - Provide patient education as appropriate  Outcome: Progressing     Problem: DISCHARGE PLANNING  Goal: Discharge to home or other facility with appropriate resources  Description: INTERVENTIONS:  - Identify barriers to discharge w/patient and caregiver  - Arrange for needed discharge resources and transportation as appropriate  - Identify discharge learning needs (meds, wound care, etc.)  - Refer to Case Management Department for coordinating discharge planning if the patient needs post-hospital services based on physician/advanced practitioner order or complex needs related to functional status, cognitive ability, or social support system  Outcome: Progressing     Problem: Knowledge Deficit  Goal: Patient/family/caregiver demonstrates understanding of disease process, treatment plan, medications, and discharge instructions  Description: Complete learning assessment and assess knowledge base.  Interventions:  - Provide teaching at level of understanding  - Provide teaching via preferred learning methods  Outcome: Progressing     Problem: CARDIOVASCULAR - ADULT  Goal: Maintains optimal cardiac output and hemodynamic stability  Description: INTERVENTIONS:  - Monitor I/O, vital signs and rhythm  - Monitor for S/S and trends of decreased cardiac output  - Administer and titrate ordered vasoactive medications to optimize hemodynamic stability  - Assess quality of pulses, skin color and temperature  - Assess for signs of decreased coronary artery perfusion  - Instruct patient to report change in severity of symptoms  Outcome: Progressing     Problem: RESPIRATORY - ADULT  Goal: Achieves optimal ventilation and  oxygenation  Description: INTERVENTIONS:  - Assess for changes in respiratory status  - Assess for changes in mentation and behavior  - Position to facilitate oxygenation and minimize respiratory effort  - Oxygen administered by appropriate delivery if ordered  - Initiate smoking cessation education as indicated  - Encourage broncho-pulmonary hygiene including cough, deep breathe, Incentive Spirometry  - Assess the need for suctioning and aspirate as needed  - Assess and instruct to report SOB or any respiratory difficulty  - Respiratory Therapy support as indicated  Outcome: Progressing     Problem: METABOLIC, FLUID AND ELECTROLYTES - ADULT  Goal: Glucose maintained within target range  Description: INTERVENTIONS:  - Monitor Blood Glucose as ordered  - Assess for signs and symptoms of hyperglycemia and hypoglycemia  - Administer ordered medications to maintain glucose within target range  - Assess nutritional intake and initiate nutrition service referral as needed  Outcome: Progressing     Problem: SKIN/TISSUE INTEGRITY - ADULT  Goal: Incision(s), wounds(s) or drain site(s) healing without S/S of infection  Description: INTERVENTIONS  - Assess and document dressing, incision, wound bed, drain sites and surrounding tissue  - Provide patient and family education  - Perform skin care/dressing changes daily  Outcome: Progressing     Problem: MUSCULOSKELETAL - ADULT  Goal: Maintain or return mobility to safest level of function  Description: INTERVENTIONS:  - Assess patient's ability to carry out ADLs; assess patient's baseline for ADL function and identify physical deficits which impact ability to perform ADLs (bathing, care of mouth/teeth, toileting, grooming, dressing, etc.)  - Assess/evaluate cause of self-care deficits   - Assess range of motion  - Assess patient's mobility  - Assess patient's need for assistive devices and provide as appropriate  - Encourage maximum independence but intervene and supervise when  necessary  - Involve family in performance of ADLs  - Assess for home care needs following discharge   - Consider OT consult to assist with ADL evaluation and planning for discharge  - Provide patient education as appropriate  Outcome: Progressing

## 2023-12-26 NOTE — ASSESSMENT & PLAN NOTE
With known history of coronary artery disease status post CABG.  Patient is on statin and beta-blocker.  Likely aspirin discontinued due to recent fall and subdural/subarachnoid hemorrhage.  Patient with elevated troponin and cardiology evaluation appreciated-

## 2023-12-26 NOTE — ASSESSMENT & PLAN NOTE
Patient was admitted to the hospital after a fall.  Noted to have subarachnoid hemorrhage/subdural hemorrhage.  Repeat imaging showed resolution of the subarachnoid hemorrhage.  Hold DVT prophylaxis for now

## 2023-12-26 NOTE — ASSESSMENT & PLAN NOTE
Patient with history of a flutter.  Currently in sinus rhythm.  Not on anticoagulation due to fall risk

## 2023-12-26 NOTE — H&P
[facility]  H&P  Name: Sidney Wheeler 83 y.o. male I MRN: [id number]  Unit/Bed#: [facility] 711-01 I Date of Admission: 12/26/2023   Date of Service: 12/26/2023 I Hospital Day: 0      Assessment/Plan   * Pneumonia  Assessment & Plan  Patient presented to the hospital with generalized weakness which has been going on for weeks.  Cough and expectoration and also posttussive emesis present.  No fever at home  Leukocytosis noted.  Mildly elevated procalcitonin present.  Chest x-ray reviewed.-Bilateral right greater than left pleural effusions and right lower lobe consolidation concerning for a possible pneumonia.   Patient was started on antibiotics in the emergency room.  Will continue with antibiotics.  Obtain CT chest abdomen and pelvis    Generalized weakness  Assessment & Plan  Patient came to the hospital with gradually worsening generalized weakness has been going on for weeks at this point.  Denies any fever.  Patient also with poor p.o. intake.  Likely multifactorial.  PT OT eval    Transaminitis  Assessment & Plan  Patient noted to have new onset transaminitis.  No abdominal pain on examination.  CT abdomen and pelvis pending    D-dimer, elevated  Assessment & Plan  Patient with elevated D-dimer.  Patient also with a worsening malignancy/CKD.    Denies any shortness of breath  Given creatinine of 3.5 will hold of ordering CTA.  Will obtain lower extremity Doppler    Atrial flutter (HCC)  Assessment & Plan  Patient with history of a flutter.  Currently in sinus rhythm.  Not on anticoagulation due to fall risk    SAH (subarachnoid hemorrhage) (HCC)  Assessment & Plan  Patient was admitted to the hospital after a fall.  Noted to have subarachnoid hemorrhage/subdural hemorrhage.  Repeat imaging showed resolution of the subarachnoid hemorrhage.  Hold DVT prophylaxis for now    Stage 4 chronic kidney disease (HCC)  Assessment & Plan  Lab Results   Component Value Date    EGFR 15  12/26/2023    EGFR 23 11/13/2023    EGFR 25 11/01/2023    CREATININE 3.52 (H) 12/26/2023    CREATININE 2.41 (H) 11/13/2023    CREATININE 2.25 (H) 11/01/2023   Patient with CKD stage IV.  Baseline creatinine appears to be   2-2.8 per previous nephrology progress note  Creatinine today is 3.52.  Patient with generalized p.o. intake and decreased p.o. intake.  Gentle hydration hold torsemide and monitor creatinine.  Obtain CT abdomen and pelvis given concern for carcinomatosis on previous CT          Benign hypertension with chronic kidney disease, stage IV (HCC)  Assessment & Plan  Lab Results   Component Value Date    EGFR 15 12/26/2023    EGFR 23 11/13/2023    EGFR 25 11/01/2023    CREATININE 3.52 (H) 12/26/2023    CREATININE 2.41 (H) 11/13/2023    CREATININE 2.25 (H) 11/01/2023   Patient is on hydralazine and amlodipine and also metoprolol as outpatient which we will continue.  Monitor blood pressure    Elevated troponin  Assessment & Plan  Patient with elevated troponin-4705  at the time of presentation.  Cardiology evaluation appreciated  And normal cardiac troponin.  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 CT scan on December 2022 showed no evidence of disease in the abdomen or pelvis   Repeat imaging done recently showed a worsening of the disease process.  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  Patient was scheduled for the repeat CAT scans today.  Will obtain the CAT scan as inpatient.  Patient reported that he  "may consider no further treatment -but would like to get the CAT scan before making that decision.  May need heme-onc evaluation for further clarification of goals of care    Hx of CABG  Assessment & Plan  With known history of coronary artery disease status post CABG.  Patient is on statin and beta-blocker.  Likely aspirin discontinued due to recent fall and subdural/subarachnoid hemorrhage.  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    Blood Sugar Average: Last 72 hrs:  Is on Farxiga as outpatient.  Hold oral hypoglycemic agent.  Start the patient on sliding scale for now.  Patient with poor p.o. intake         .VTE Pharmacologic Prophylaxis:   Moderate Risk (Score 3-4) - Pharmacological DVT Prophylaxis Contraindicated. Sequential Compression Devices Ordered.  Code Status: Level 3 - DNAR and DNI   Discussion with family: Updated son and wife at bedside    Anticipated Length of Stay: Patient will be admitted on an inpatient basis with an anticipated length of stay of greater than 2 midnights secondary to pneumonia .    Total Time Spent on Date of Encounter in care of patient: 65  mins. This time was spent on one or more of the following: performing physical exam; counseling and coordination of care; obtaining or reviewing history; documenting in the medical record; reviewing/ordering tests, medications or procedures; communicating with other healthcare professionals and discussing with patient's family/caregivers.    Chief Complaint: \Generalized weakness    History of Present Illness:  Sidney Wheeler is a 83 y.o. male with a PMH of bladder cancer status post urostomy placement with recurrence, coronary artery disease status post CABG. "  CKD stage IV, hypertension, type 2 diabetes mellitus presented today with generalized weakness.  Patient reported that his symptoms has been going on for the past 3 weeks or so.  Patient with gradually increasing generalized weakness with cough and expectoration.  Patient also reported posttussive emesis.  Denies any abdominal pain.  Patient also with low p.o. intake.  Patient denies any fever or chills.  Patient with poor p.o. intake and may have lost some weight recently.  Denies any chest pain or any palpitation.  Patient with recent fall and was admitted under trauma.  At that time patient noted to have subarachnoid and subdural hemorrhage and also closed fracture of the temporal bone on the right side with blood in the right ear.  During the trauma admission patient had a CT abdomen and pelvis showing worsening of the malignancy.  Patient was evaluated by hematology oncology on 11/16/2023 at that time plan was to repeat imaging studies after the holidays and make the decision regarding palliative care versus further treatment.  Patient had a chest x-ray in the emergency room that was concerning for pneumonia.  Will order a CT chest abdomen and pelvis to evaluate the pulmonary and intra-abdominal process.  Wife noticed that the patient with a left-sided abdominal distention.  Patient complaining of constipation and also pain in his left buttocks.  Patient also complaining of abdominal pain which he attributes to constipation    Review of Systems:  Review of Systems   Constitutional:  Positive for activity change, appetite change, fatigue and unexpected weight change. Negative for chills and fever.   Eyes: Negative.    Respiratory:  Positive for cough.    Gastrointestinal:  Positive for abdominal distention, abdominal pain, constipation and vomiting. Negative for diarrhea.   Endocrine: Negative.    Genitourinary:  Negative for hematuria.   Musculoskeletal:  Positive for back pain and gait problem.    Neurological:  Negative for dizziness, speech difficulty and light-headedness.   Hematological: Negative.    Psychiatric/Behavioral: Negative.         Past Medical and Surgical History:   Past Medical History:   Diagnosis Date    Acute kidney injury (HCC)     Anemia Jan. 2022    Arthritis     Hands    Wright esophagus     BPH with obstruction/lower urinary tract symptoms     CAD (coronary artery disease)     Last assessed 09/16/15    Cancer (HCC)     bladder    Cataract, acquired     Last assessed 10/10/17    Chronic kidney disease     Coronary artery disease     Diabetes mellitus (HCC)     NIDDM    Diabetic neuropathy ([facility])     Feet    Enlarged prostate with lower urinary tract symptoms (LUTS)     Last assessed 10/10/17    Erectile dysfunction     GERD (gastroesophageal reflux disease)     Last assessed 10/10/17    Hemoptysis     Last assessed 03/14/16    Hypercholesterolemia     Last assessed 10/10/17    Hypertension     Last assessed 10/10/17    Kidney stone     Macular degeneration     Right eye is particularly affected-peripheral vision intact    Myocardial infarction (HCC) 1998    OAB (overactive bladder)     Testicular hypofunction     Testicular hypogonadism     Last assessed 10/10/17    Tinnitus     Umbilical hernia     Last assessed 06/18/14    Urge incontinence of urine     Wears glasses        Past Surgical History:   Procedure Laterality Date    BLADDER SURGERY      COLONOSCOPY      CORONARY ARTERY BYPASS GRAFT  07/16/2014    ABG x 4 LIMA to LAD,SVG to diagnoal 2 SVG to OM-1, SVG to PDA, resection of partial plerual mass    CT GUIDED PERC DRAINAGE CATHETER PLACEMENT  02/19/2021    CYSTOSCOPY  2013    CYSTOSCOPY  02/08/2022    [place]    ESOPHAGOGASTRODUODENOSCOPY      FL RETROGRADE PYELOGRAM  12/20/2018    FL RETROGRADE PYELOGRAM  12/05/2019    INGUINAL HERNIA REPAIR Bilateral 2015    IR DRAINAGE TUBE CHECK AND/OR REMOVAL  03/05/2021    PHOTODYNAMIC THERAPY      For Barretts esophagus    [place]  COLONOSCOPY FLX DX W/COLLJ SPEC WHEN PFRMD N/A 04/25/2016    Procedure: COLONOSCOPY;  Surgeon: Yann Hilliard MD;  Location: [facility];  Service: Gastroenterology    DE CYSTO W/REMOVAL OF LESIONS SMALL N/A 12/05/2019    Procedure: CYSTO W/TURBT AND TRANSURETHRAL PROSTATE BIOPSY AND OPENING OF BLADDER NECK CONTRACTURE, B/L Retrograde pyelogram;  Surgeon: Adam Reese MD;  Location: [facility];  Service: Urology    DE CYSTOURETHROSCOPY W/DEST &/RMVL MED BLADDER TY N/A 04/16/2020    Procedure: CYSTOSCOPY, TRANSURETHRAL RESECTION OF BLADDER TUMOR (TURBT);  Surgeon: Adam Reese MD;  [facility];  Service: Urology    DE CYSTOURETHROSCOPY WITH BIOPSY N/A 09/10/2020    Procedure: CYSTOSCOPY, COLLECTION OF LEFT KIDNEY CYTOLOGY, BILATERAL RETROGRADE PYELOGRAM, BLADDER WALL BIOPSIES  AND FULGERAION, RANDOM BLADDER TUMOR BIOPSIES;  Surgeon: Adam Reese MD;  Location:  MAIN OR;  Service: Urology    DE CYSTOURETHROSCOPY WITH BIOPSY N/A 04/05/2022    Procedure: urethroscopy , biopsy of urethra with fulgeration;  Surgeon: Jay Dobson MD;  Location:  MAIN OR;  Service: Urology    PROSTATE SURGERY      TONSILLECTOMY      TRANSURETHRAL RESECTION OF PROSTATE N/A 12/20/2018    Procedure: CYSTO, PHOTO SELECTIVE VAPORIZATION OF PROSTATE, B/L RETROGRADE PYELOGRAM, TURBT;  Surgeon: Adam Reese MD;  [facility];  Service: Urology    UMBILICAL HERNIA REPAIR  2012    UPPER GASTROINTESTINAL ENDOSCOPY         Meds/Allergies:  Prior to Admission medications    Medication Sig Start Date End Date Taking? Authorizing Provider   acetaminophen (TYLENOL) 325 mg tablet Take 2 tablets (650 mg total) by mouth 3 (three) times a day as needed for mild pain  Patient taking differently: Take 650 mg by mouth if needed for mild pain 9/28/23   Kentrell Tejada MD   amLODIPine (NORVASC) 5 mg tablet Take 1 tablet (5 mg total) by mouth 2 (two) times a day 9/28/23   Kentrell Tejada MD   ascorbic acid  (VITAMIN C) 500 MG tablet Take 1 tablet (500 mg total) by mouth daily Do not start before September 29, 2023. 9/29/23   Kentrell Tejada MD   atorvastatin (LIPITOR) 40 mg tablet TAKE ONE TABLET BY MOUTH AT BEDTIME 4/27/23   Sixto Falcon DO   Blood Glucose Monitoring Suppl (OneTouch Verio Flex System) w/Device KIT Use to check blood sugars daily 1/23/23   Sixto Falcon DO   calcitriol (ROCALTROL) 0.25 mcg capsule Take 1 tablet 2 times a week (Monday, Friday)  Patient taking differently: Take 1 tablet 2 times a week (Tuesday, Friday) 5/4/23   Tray French MD   citalopram (CeleXA) 20 mg tablet TAKE ONE TABLET BY MOUTH EVERY DAY 6/15/23   Sixto Falcon DO   dapagliflozin (Farxiga) 5 MG TABS Take 1 tablet (5 mg total) by mouth daily 10/9/23   Sixto Falcon DO   ferrous gluconate (FERGON) 324 mg tablet Take 1 tablet (324 mg total) by mouth daily before breakfast Can take ferrous sulfate Do not start before September 29, 2023. 9/29/23   Kentrell Tejada MD   fluticasone (FLONASE) 50 mcg/act nasal spray 1 spray into each nostril daily Do not start before September 29, 2023. 9/29/23   Kentrell Tejada MD   folic acid (FOLVITE) 1 mg tablet Take 1 tablet (1 mg total) by mouth daily Do not start before September 29, 2023. 9/29/23   Kentrell Tejada MD   hydrALAZINE (APRESOLINE) 10 mg tablet Take 1 tablet (10 mg total) by mouth 3 (three) times a day 9/28/23   Kentrell Tejada MD   Lancets (OneTouch Delica Plus [id number]) MISC TEST BLOOD GLUCOSE ONCE DAILY 8/9/23   Sixto Falcon DO   loratadine (CLARITIN) 10 mg tablet Take 1 tablet (10 mg total) by mouth daily as needed for allergies 9/28/23   Kentrell Tejada MD   magnesium oxide (MAG-OX) 400 mg Take 1 tablet (400 mg total) by mouth in the morning. 5/19/22   Tray French MD   melatonin 3 mg Take 2 tablets (6 mg total) by mouth daily at bedtime 9/28/23   Kentrell Tejada MD   metoprolol tartrate (LOPRESSOR) 25 mg tablet  11/3/23    Historical Provider, MD   Multiple Vitamins-Minerals (OCUVITE-LUTEIN PO) Take 1 tablet by mouth 2 (two) times a day Pt is taking preservision     Historical Provider, MD   omeprazole (PriLOSEC) 40 MG capsule TAKE ONE CAPSULE BY MOUTH EVERY MORNING 23   Sixto Falcon, DO   OneTouch Verio test strip TEST ONCE A DAY 23   Sixto Falcon, DO   polyethylene glycol (GLYCOLAX) 17 GM/SCOOP powder Take 17 g by mouth daily as needed (Constipation) Can substitute for closest size available 23   Kentrell Tejada MD   sodium bicarbonate 650 mg tablet Take 2 tablets (1,300 mg total) by mouth 2 (two) times a day 23   LUKE Sutton   torsemide (DEMADEX) 20 mg tablet Take 1 tablet (20 mg total) by mouth every other day 23   Tray French MD   traZODone (DESYREL) 50 mg tablet Take 0.5 tablets (25 mg total) by mouth daily at bedtime as needed for sleep 23   Kentrell Tejada MD     I have reviewed home medications with patient personally.    Allergies:   Allergies   Allergen Reactions    Fentanyl Hallucinations    Morphine And Related Other (See Comments)     While having an MI patient received morphine and had adverse reaction but doesn't know what happened.       Social History:  Marital Status: /Civil Union   Occupation:   Patient Pre-hospital Living Situation: Home  Patient Pre-hospital Level of Mobility:   Patient Pre-hospital Diet Restrictions:   Substance Use History:   Social History     Substance and Sexual Activity   Alcohol Use Not Currently    Alcohol/week: 2.0 standard drinks of alcohol    Types: 1 Glasses of wine, 1 Cans of beer per week    Comment: Non since 7/15/2020     Social History     Tobacco Use   Smoking Status Former    Current packs/day: 0.00    Average packs/day: 1 pack/day for 10.0 years (10.0 ttl pk-yrs)    Types: Cigarettes    Start date: 1962    Quit date: 1972    Years since quittin.0   Smokeless Tobacco Never   Tobacco Comments    Quit at age  32     Social History     Substance and Sexual Activity   Drug Use Never       Family History:  Family History   Problem Relation Age of Onset    Cancer Mother         Topical oral abrasian caused cancer    Other Mother         Digestive System Complications    Diabetes Father     Heart disease Father     Hypertension Father     Coronary artery disease Father     Hyperlipidemia Father        Physical Exam:     Vitals:   Blood Pressure: (!) 156/102 (12/26/23 1608)  Pulse: 88 (12/26/23 1608)  Temperature: (!) 97.3 °F (36.3 °C) (12/26/23 1608)  Temp Source: Oral (12/26/23 1015)  Respirations: 20 (12/26/23 1015)  SpO2: 96 % (12/26/23 1608)    Physical Exam  Constitutional:       General: He is not in acute distress.  HENT:      Head: Normocephalic and atraumatic.      Nose: Nose normal.   Eyes:      General:         Right eye: No discharge.   Cardiovascular:      Rate and Rhythm: Normal rate and regular rhythm.   Pulmonary:      Effort: Pulmonary effort is normal. No respiratory distress.      Breath sounds: No stridor. No wheezing.      Comments: Decreased breath sounds bilateral  Abdominal:      General: Abdomen is flat.      Comments: Urostomy present   Musculoskeletal:         General: No swelling. Normal range of motion.      Cervical back: No rigidity.   Skin:     General: Skin is warm.      Coloration: Skin is not jaundiced.   Neurological:      Mental Status: He is alert. Mental status is at baseline.          Additional Data:     Lab Results:  Results from last 7 days   Lab Units 12/26/23  1100   WBC Thousand/uL 17.94*   HEMOGLOBIN g/dL 11.8*   HEMATOCRIT % 38.9   PLATELETS Thousands/uL 309   NEUTROS PCT % 88*   LYMPHS PCT % 3*   MONOS PCT % 8   EOS PCT % 0     Results from last 7 days   Lab Units 12/26/23  1100   SODIUM mmol/L 136   POTASSIUM mmol/L 4.8   CHLORIDE mmol/L 101   CO2 mmol/L 18*   BUN mg/dL 57*   CREATININE mg/dL 3.52*   ANION GAP mmol/L 17   CALCIUM mg/dL 8.7   ALBUMIN g/dL 3.3*   TOTAL BILIRUBIN  mg/dL 0.83   ALK PHOS U/L 176*   ALT U/L 95*   AST U/L 151*   GLUCOSE RANDOM mg/dL 177*     Results from last 7 days   Lab Units 12/26/23  1237   INR  1.29*             Results from last 7 days   Lab Units 12/26/23  1237   LACTIC ACID mmol/L 1.9   PROCALCITONIN ng/ml 0.69*       Lines/Drains:  Invasive Devices       Peripheral Intravenous Line  Duration             Peripheral IV 12/26/23 Left;Ventral (anterior) Forearm <1 day              Drain  Duration             Urostomy Ureterostomy right  days                        Imaging: Reviewed radiology reports from this admission including: chest xray  XR chest 2 views   ED Interpretation by Georgi Morales DO (12/26 0027)   Chest x-ray interpreted by me shows a right lower lobe infiltrate      Final Result by Chris Ruff MD (12/26 9413)      Bilateral right greater than left pleural effusions and right lower lobe consolidation concerning for a possible pneumonia.      The study was marked in EPIC for immediate notification.                  Workstation performed: [id number]         CT chest abdomen pelvis wo contrast    (Results Pending)   VAS lower limb venous duplex study, complete bilateral    (Results Pending)       EKG and Other Studies Reviewed on Admission:   EKG: Rhythm with a first-degree AV block    ** Please Note: This note has been constructed using a voice recognition system. **

## 2023-12-26 NOTE — QUICK NOTE
CT chest abdomen and pelvis reviewed.  Hold further antibiotics.  And wife updated about the results.  Consult placed for discussion regarding further management

## 2023-12-26 NOTE — ASSESSMENT & PLAN NOTE
Patient presented to the hospital with generalized weakness which has been going on for weeks.  Cough and expectoration and also posttussive emesis present.  No fever at home  Leukocytosis noted.  Mildly elevated procalcitonin present.  Chest x-ray reviewed.-Bilateral right greater than left pleural effusions and right lower lobe consolidation concerning for a possible pneumonia.   Patient was started on antibiotics in the emergency room.  Will continue with antibiotics.  Obtain CT chest abdomen and pelvis

## 2023-12-26 NOTE — ED PROVIDER NOTES
History  Chief Complaint   Patient presents with    Vomiting     Per EMS pt has been having a cough for a couple of days and has been vomiting w/ the cough     83-year-old male with history of active bladder cancer and CKD presents with worsening cough, dyspnea at rest, fatigue, and nausea.  Symptoms ongoing for the past 1 to 2 weeks.  Denies chest pain, history of PE or DVT, abdominal pain, or headache.  Patient is not currently on chemotherapy.        Prior to Admission Medications   Prescriptions Last Dose Informant Patient Reported? Taking?   Blood Glucose Monitoring Suppl (OneTouch Verio Flex System) w/Device KIT  Self No No   Sig: Use to check blood sugars daily   Lancets (OneTouch Delica Plus [id number]) MISC  Self No No   Sig: TEST BLOOD GLUCOSE ONCE DAILY   Multiple Vitamins-Minerals (OCUVITE-LUTEIN PO)  Self Yes No   Sig: Take 1 tablet by mouth 2 (two) times a day Pt is taking preservision    OneTouch Verio test strip  Self No No   Sig: TEST ONCE A DAY   acetaminophen (TYLENOL) 325 mg tablet  Self No No   Sig: Take 2 tablets (650 mg total) by mouth 3 (three) times a day as needed for mild pain   Patient taking differently: Take 650 mg by mouth if needed for mild pain   amLODIPine (NORVASC) 5 mg tablet  Self No No   Sig: Take 1 tablet (5 mg total) by mouth 2 (two) times a day   ascorbic acid (VITAMIN C) 500 MG tablet  Self No No   Sig: Take 1 tablet (500 mg total) by mouth daily Do not start before September 29, 2023.   atorvastatin (LIPITOR) 40 mg tablet  Self No No   Sig: TAKE ONE TABLET BY MOUTH AT BEDTIME   calcitriol (ROCALTROL) 0.25 mcg capsule  Self No No   Sig: Take 1 tablet 2 times a week (Monday, Friday)   Patient taking differently: Take 1 tablet 2 times a week (Tuesday, Friday)   citalopram (CeleXA) 20 mg tablet  Self No No   Sig: TAKE ONE TABLET BY MOUTH EVERY DAY   dapagliflozin (Farxiga) 5 MG TABS   No No   Sig: Take 1 tablet (5 mg total) by mouth daily   ferrous gluconate (FERGON) 324 mg  tablet  Self No No   Sig: Take 1 tablet (324 mg total) by mouth daily before breakfast Can take ferrous sulfate Do not start before 2023.   fluticasone (FLONASE) 50 mcg/act nasal spray  Self No No   Si spray into each nostril daily Do not start before 2023.   folic acid (FOLVITE) 1 mg tablet  Self No No   Sig: Take 1 tablet (1 mg total) by mouth daily Do not start before 2023.   hydrALAZINE (APRESOLINE) 10 mg tablet  Self No No   Sig: Take 1 tablet (10 mg total) by mouth 3 (three) times a day   loratadine (CLARITIN) 10 mg tablet  Self No No   Sig: Take 1 tablet (10 mg total) by mouth daily as needed for allergies   magnesium oxide (MAG-OX) 400 mg  Self No No   Sig: Take 1 tablet (400 mg total) by mouth in the morning.   melatonin 3 mg  Self No No   Sig: Take 2 tablets (6 mg total) by mouth daily at bedtime   metoprolol tartrate (LOPRESSOR) 25 mg tablet   Yes No   omeprazole (PriLOSEC) 40 MG capsule  Self No No   Sig: TAKE ONE CAPSULE BY MOUTH EVERY MORNING   polyethylene glycol (GLYCOLAX) 17 GM/SCOOP powder  Self No No   Sig: Take 17 g by mouth daily as needed (Constipation) Can substitute for closest size available   sodium bicarbonate 650 mg tablet  Self No No   Sig: Take 2 tablets (1,300 mg total) by mouth 2 (two) times a day   torsemide (DEMADEX) 20 mg tablet  Self No No   Sig: Take 1 tablet (20 mg total) by mouth every other day   traZODone (DESYREL) 50 mg tablet  Self No No   Sig: Take 0.5 tablets (25 mg total) by mouth daily at bedtime as needed for sleep      Facility-Administered Medications: None       Past Medical History:   Diagnosis Date    Acute kidney injury (HCC)     Anemia 2022    Arthritis     Hands    Wright esophagus     BPH with obstruction/lower urinary tract symptoms     CAD (coronary artery disease)     Last assessed 09/16/15    Cancer (HCC)     bladder    Cataract, acquired     Last assessed 10/10/17    Chronic kidney disease     Coronary  artery disease     Diabetes mellitus (HCC)     NIDDM    Diabetic neuropathy (HCC)     Feet    Enlarged prostate with lower urinary tract symptoms (LUTS)     Last assessed 10/10/17    Erectile dysfunction     GERD (gastroesophageal reflux disease)     Last assessed 10/10/17    Hemoptysis     Last assessed 03/14/16    Hypercholesterolemia     Last assessed 10/10/17    Hypertension     Last assessed 10/10/17    Kidney stone     Macular degeneration     Right eye is particularly affected-peripheral vision intact    Myocardial infarction (HCC) 1998    OAB (overactive bladder)     Testicular hypofunction     Testicular hypogonadism     Last assessed 10/10/17    Tinnitus     Umbilical hernia     Last assessed 06/18/14    Urge incontinence of urine     Wears glasses        Past Surgical History:   Procedure Laterality Date    BLADDER SURGERY      COLONOSCOPY      CORONARY ARTERY BYPASS GRAFT  07/16/2014    ABG x 4 LIMA to LAD,SVG to diagnoal 2 SVG to OM-1, SVG to PDA, resection of partial plerual mass    CT GUIDED PERC DRAINAGE CATHETER PLACEMENT  02/19/2021    CYSTOSCOPY  2013    CYSTOSCOPY  02/08/2022    [facility]    ESOPHAGOGASTRODUODENOSCOPY      FL RETROGRADE PYELOGRAM  12/20/2018    FL RETROGRADE PYELOGRAM  12/05/2019    INGUINAL HERNIA REPAIR Bilateral 2015    IR DRAINAGE TUBE CHECK AND/OR REMOVAL  03/05/2021    PHOTODYNAMIC THERAPY      For Barretts esophagus    CT COLONOSCOPY FLX DX W/COLLJ SPEC WHEN PFRMD N/A 04/25/2016    Procedure: COLONOSCOPY;  Surgeon: Yann Hilliard MD;  Location: [facility];  Service: Gastroenterology    CT CYSTO W/REMOVAL OF LESIONS SMALL N/A 12/05/2019    Procedure: CYSTO W/TURBT AND TRANSURETHRAL PROSTATE BIOPSY AND OPENING OF BLADDER NECK CONTRACTURE, B/L Retrograde pyelogram;  Surgeon: Adam Reese MD;  Location: [facility];  Service: Urology    CT CYSTOURETHROSCOPY W/DEST &/RMVL MED BLADDER TY N/A 04/16/2020    Procedure: CYSTOSCOPY, TRANSURETHRAL RESECTION OF BLADDER TUMOR (TURBT);   Surgeon: Adam Reese MD;  Location: [facility];  Service: Urology    MO CYSTOURETHROSCOPY WITH BIOPSY N/A 09/10/2020    Procedure: CYSTOSCOPY, COLLECTION OF LEFT KIDNEY CYTOLOGY, BILATERAL RETROGRADE PYELOGRAM, BLADDER WALL BIOPSIES  AND FULGERAION, RANDOM BLADDER TUMOR BIOPSIES;  Surgeon: Adam Reese MD;  Location:  [facility];  Service: Urology    MO CYSTOURETHROSCOPY WITH BIOPSY N/A 2022    Procedure: urethroscopy , biopsy of urethra with fulgeration;  Surgeon: Jay Dobson MD;  Location:  [facility];  Service: Urology    PROSTATE SURGERY      TONSILLECTOMY      TRANSURETHRAL RESECTION OF PROSTATE N/A 2018    Procedure: CYSTO, PHOTO SELECTIVE VAPORIZATION OF PROSTATE, B/L RETROGRADE PYELOGRAM, TURBT;  Surgeon: Adam Reese MD;  Location: [facility];  Service: Urology    UMBILICAL HERNIA REPAIR      UPPER GASTROINTESTINAL ENDOSCOPY         Family History   Problem Relation Age of Onset    Cancer Mother         Topical oral abrasian caused cancer    Other Mother         Digestive System Complications    Diabetes Father     Heart disease Father     Hypertension Father     Coronary artery disease Father     Hyperlipidemia Father      I have reviewed and agree with the history as documented.    E-Cigarette/Vaping    E-Cigarette Use Never User      E-Cigarette/Vaping Substances    Nicotine No     THC No     CBD No     Flavoring No     Other No     Unknown No      Social History     Tobacco Use    Smoking status: Former     Current packs/day: 0.00     Average packs/day: 1 pack/day for 10.0 years (10.0 ttl pk-yrs)     Types: Cigarettes     Start date: 1962     Quit date: 1972     Years since quittin.0    Smokeless tobacco: Never    Tobacco comments:     Quit at age 32   Vaping Use    Vaping status: Never Used   Substance Use Topics    Alcohol use: Not Currently     Alcohol/week: 2.0 standard drinks of alcohol     Types: 1 Glasses of wine, 1 Cans of beer per week      Comment: Non since 7/15/2020    Drug use: Never        Review of Systems   Constitutional:  Negative for chills and fever.   HENT:  Negative for ear pain and sore throat.    Eyes:  Negative for pain and visual disturbance.   Respiratory:  Positive for cough and shortness of breath.    Cardiovascular:  Negative for chest pain and palpitations.   Gastrointestinal:  Positive for nausea. Negative for abdominal pain and vomiting.   Genitourinary:  Negative for dysuria and hematuria.   Musculoskeletal:  Negative for arthralgias and back pain.   Skin:  Negative for color change and rash.   Neurological:  Negative for seizures and syncope.   All other systems reviewed and are negative.      Physical Exam  ED Triage Vitals [12/26/23 1015]   Temperature Pulse Respirations Blood Pressure SpO2   (!) 97.4 °F (36.3 °C) 94 20 146/79 94 %      Temp Source Heart Rate Source Patient Position - Orthostatic VS BP Location FiO2 (%)   Oral Monitor Sitting Right arm --      Pain Score       --             Orthostatic Vital Signs  Vitals:    12/26/23 1015 12/26/23 1608   BP: 146/79 (!) 156/102   Pulse: 94 88   Patient Position - Orthostatic VS: Sitting        Physical Exam  Vitals and nursing note reviewed.   Constitutional:       General: He is in acute distress.      Appearance: He is well-developed. He is ill-appearing. He is not toxic-appearing or diaphoretic.   HENT:      Head: Normocephalic and atraumatic.      Right Ear: External ear normal.      Left Ear: External ear normal.      Nose: Nose normal.      Mouth/Throat:      Mouth: Mucous membranes are moist.   Eyes:      General:         Right eye: No discharge.         Left eye: No discharge.      Conjunctiva/sclera: Conjunctivae normal.   Cardiovascular:      Rate and Rhythm: Normal rate and regular rhythm.      Pulses: Normal pulses.      Heart sounds: No murmur heard.  Pulmonary:      Effort: Pulmonary effort is normal. No respiratory distress.      Breath sounds: No stridor.  Rhonchi present. No wheezing or rales.   Abdominal:      General: Abdomen is flat. There is no distension.      Palpations: Abdomen is soft.      Tenderness: There is no abdominal tenderness. There is no guarding.   Musculoskeletal:         General: No swelling.      Cervical back: Normal range of motion and neck supple. No rigidity or tenderness.   Skin:     General: Skin is warm and dry.      Capillary Refill: Capillary refill takes less than 2 seconds.   Neurological:      Mental Status: He is alert and oriented to person, place, and time.   Psychiatric:         Mood and Affect: Mood normal.         Behavior: Behavior normal.         ED Medications  Medications   acetaminophen (TYLENOL) tablet 650 mg (has no administration in time range)   amLODIPine (NORVASC) tablet 5 mg (has no administration in time range)   ascorbic acid (VITAMIN C) tablet 500 mg (has no administration in time range)   atorvastatin (LIPITOR) tablet 40 mg (has no administration in time range)   calcitriol (ROCALTROL) capsule 0.25 mcg (has no administration in time range)   citalopram (CeleXA) tablet 20 mg (has no administration in time range)   ferrous gluconate (FERGON) tablet 324 mg (has no administration in time range)   fluticasone (FLONASE) 50 mcg/act nasal spray 1 spray (has no administration in time range)   folic acid (FOLVITE) tablet 1 mg (has no administration in time range)   hydrALAZINE (APRESOLINE) tablet 10 mg (has no administration in time range)   loratadine (CLARITIN) tablet 10 mg (has no administration in time range)   magnesium Oxide (MAG-OX) tablet 400 mg (has no administration in time range)   melatonin tablet 6 mg (has no administration in time range)   metoprolol tartrate (LOPRESSOR) tablet 25 mg (has no administration in time range)   multivitamin-minerals (CENTRUM) tablet 1 tablet (has no administration in time range)   pantoprazole (PROTONIX) EC tablet 40 mg (has no administration in time range)   sodium bicarbonate  tablet 1,300 mg (has no administration in time range)   traZODone (DESYREL) tablet 25 mg (has no administration in time range)   docusate sodium (COLACE) capsule 100 mg (has no administration in time range)   senna (SENOKOT) tablet 8.6 mg (has no administration in time range)   polyethylene glycol (MIRALAX) packet 17 g (has no administration in time range)   simethicone (MYLICON) chewable tablet 80 mg (has no administration in time range)   ondansetron (ZOFRAN) injection 4 mg (has no administration in time range)   insulin lispro (HumaLOG) 100 units/mL subcutaneous injection 1-6 Units (has no administration in time range)   sodium chloride 0.9 % bolus 500 mL (0 mL Intravenous Stopped 12/26/23 1421)   ceftriaxone (ROCEPHIN) 2 g/50 mL in dextrose IVPB (0 mg Intravenous Stopped 12/26/23 1448)   doxycycline (VIBRAMYCIN) 100 mg in sodium chloride 0.9 % 100 mL IVPB (100 mg Intravenous New Bag 12/26/23 1520)       Diagnostic Studies  Results Reviewed       Procedure Component Value Units Date/Time    CK [[id number]]  (Normal) Collected: 12/26/23 1100    Lab Status: Final result Specimen: Blood from Arm, Left Updated: 12/26/23 1637     Total  U/L     HS Troponin I 4hr [[id number]]  (Abnormal) Collected: 12/26/23 1518    Lab Status: Final result Specimen: Blood from Arm, Left Updated: 12/26/23 1610     hs TnI 4hr 3,862 ng/L      Delta 4hr hsTnI -843 ng/L     Blood culture #1 [[id number]] Collected: 12/26/23 1242    Lab Status: Preliminary result Specimen: Blood from Hand, Right Updated: 12/26/23 1601     Blood Culture Received in Microbiology Lab. Culture in Progress.    Blood culture #2 [[id number]] Collected: 12/26/23 1237    Lab Status: Preliminary result Specimen: Blood from Line, Venous Updated: 12/26/23 1601     Blood Culture Received in Microbiology Lab. Culture in Progress.    CKMB [[id number]]     Lab Status: No result Specimen: Blood     Protime-INR [[id number]]  (Abnormal) Collected: 12/26/23 1237    Lab Status:  Final result Specimen: Blood from Arm, Left Updated: 12/26/23 1337     Protime 16.0 seconds      INR 1.29    APTT [[id number]]  (Normal) Collected: 12/26/23 1237    Lab Status: Final result Specimen: Blood from Arm, Left Updated: 12/26/23 1337     PTT 27 seconds     D-dimer, quantitative [[id number]]  (Abnormal) Collected: 12/26/23 1237    Lab Status: Final result Specimen: Blood from Arm, Left Updated: 12/26/23 1337     D-Dimer, Quant 7.41 ug/ml FEU     Narrative:      In the evaluation for possible pulmonary embolism, in the appropriate (Well's Score of 4 or less) patient, the age adjusted d-dimer cutoff for this patient can be calculated as:    Age x 0.01 (in ug/mL) for Age-adjusted D-dimer exclusion threshold for a patient over 50 years.    Procalcitonin [[id number]]  (Abnormal) Collected: 12/26/23 1237    Lab Status: Final result Specimen: Blood from Arm, Left Updated: 12/26/23 1336     Procalcitonin 0.69 ng/ml     HS Troponin I 2hr [[id number]]  (Abnormal) Collected: 12/26/23 1237    Lab Status: Final result Specimen: Blood from Arm, Left Updated: 12/26/23 1322     hs TnI 2hr 3,885 ng/L      Delta 2hr hsTnI -820 ng/L     Lactic acid [[id number]]  (Normal) Collected: 12/26/23 1237    Lab Status: Final result Specimen: Blood from Arm, Left Updated: 12/26/23 1317     LACTIC ACID 1.9 mmol/L     Narrative:      Result may be elevated if tourniquet was used during collection.    Comprehensive metabolic panel [[id number]]  (Abnormal) Collected: 12/26/23 1100    Lab Status: Final result Specimen: Blood from Arm, Left Updated: 12/26/23 1200     Sodium 136 mmol/L      Potassium 4.8 mmol/L      Chloride 101 mmol/L      CO2 18 mmol/L      ANION GAP 17 mmol/L      BUN 57 mg/dL      Creatinine 3.52 mg/dL      Glucose 177 mg/dL      Calcium 8.7 mg/dL      Corrected Calcium 9.3 mg/dL       U/L      ALT 95 U/L      Alkaline Phosphatase 176 U/L      Total Protein 6.8 g/dL      Albumin 3.3 g/dL      Total Bilirubin 0.83 mg/dL       eGFR 15 ml/min/1.73sq m     Narrative:      [employer] guidelines for Chronic Kidney Disease (CKD):     Stage 1 with normal or high GFR (GFR > 90 mL/min/1.73 square meters)    Stage 2 Mild CKD (GFR = 60-89 mL/min/1.73 square meters)    Stage 3A Moderate CKD (GFR = 45-59 mL/min/1.73 square meters)    Stage 3B Moderate CKD (GFR = 30-44 mL/min/1.73 square meters)    Stage 4 Severe CKD (GFR = 15-29 mL/min/1.73 square meters)    Stage 5 End Stage CKD (GFR <15 mL/min/1.73 square meters)  Note: GFR calculation is accurate only with a steady state creatinine    HS Troponin 0hr (reflex protocol) [[id number]]  (Abnormal) Collected: 12/26/23 1100    Lab Status: Final result Specimen: Blood from Arm, Left Updated: 12/26/23 1147     hs TnI 0hr 4,705 ng/L     CBC and differential [[id number]]  (Abnormal) Collected: 12/26/23 1100    Lab Status: Final result Specimen: Blood from Arm, Left Updated: 12/26/23 1113     WBC 17.94 Thousand/uL      RBC 4.11 Million/uL      Hemoglobin 11.8 g/dL      Hematocrit 38.9 %      MCV 95 fL      MCH 28.7 pg      MCHC 30.3 g/dL      RDW 19.4 %      MPV 11.8 fL      Platelets 309 Thousands/uL      nRBC 0 /100 WBCs      Neutrophils Relative 88 %      Immat GRANS % 1 %      Lymphocytes Relative 3 %      Monocytes Relative 8 %      Eosinophils Relative 0 %      Basophils Relative 0 %      Neutrophils Absolute 15.71 Thousands/µL      Immature Grans Absolute 0.26 Thousand/uL      Lymphocytes Absolute 0.57 Thousands/µL      Monocytes Absolute 1.34 Thousand/µL      Eosinophils Absolute 0.02 Thousand/µL      Basophils Absolute 0.04 Thousands/µL                    CT chest abdomen pelvis wo contrast   Final Result by Francy Cruz MD (12/26 1630)      Interval progression of disease as evidenced by multiple new/enlarging liver masses, several new lytic osseous lesions, enlarging left-sided mesenteric lymph nodes, and enlarging left posterior perirectal soft tissue nodule.      Acute  pathologic fracture involving the L1 vertebral body.      Small abdominopelvic ascites.      No CT evidence for pneumonia.      Moderate sized right pleural effusion with subjacent compressive atelectasis. Small left pleural effusion.      The study was marked in EPIC for significant notification.                  Workstation performed: [id number]         XR chest 2 views   ED Interpretation by Georgi Morales DO (12/26 1347)   Chest x-ray interpreted by me shows a right lower lobe infiltrate      Final Result by Chris Ruff MD (12/26 9073)      Bilateral right greater than left pleural effusions and right lower lobe consolidation concerning for a possible pneumonia.      The study was marked in EPIC for immediate notification.                  Workstation performed: [id number]         VAS lower limb venous duplex study, complete bilateral    (Results Pending)         Procedures  Procedures      ED Course  ED Course as of 12/26/23 1650   Tue Dec 26, 2023   1206 hs TnI 0hr(!): 4,705  Patient denies chest pain.  No ischemic changes on EKG.  Cardiology consulted.                                       Medical Decision Making  83-year-old male with active cancer presents with worsening dyspnea and fatigue.  Differential diagnosis includes pulmonary embolism, pneumonia, viral URI, ACS.  Plan: Labs including D-dimer, imaging    D-dimer elevated.  Acute on chronic kidney dysfunction.  Will order chest x-ray and defer on PE study at this time.    Amount and/or Complexity of Data Reviewed  Labs: ordered. Decision-making details documented in ED Course.  Radiology: ordered and independent interpretation performed.    Risk  Decision regarding hospitalization.          Disposition  Final diagnoses:   Elevated troponin I level   Pneumonia   Positive D dimer   History of bladder cancer   KO (acute kidney injury) (HCC)   Dyspnea     Time reflects when diagnosis was documented in both MDM as applicable and the  Disposition within this note       Time User Action Codes Description Comment    12/26/2023 12:06 PM Mando Daley [R79.89] Elevated troponin I level     12/26/2023  2:02 PM Mando Daley [J18.9] Pneumonia     12/26/2023  2:03 PM Mando Daley [R79.89] Positive D dimer     12/26/2023  2:03 PM Mando Daley [Z85.51] History of bladder cancer     12/26/2023  2:03 PM Mando Daley [N17.9] KO (acute kidney injury) (HCC)     12/26/2023  2:03 PM Mando Daley [R06.00] Dyspnea     12/26/2023  2:03 PM Mando Daley [R79.89] Elevated troponin I level     12/26/2023  2:03 PM Mando Daley [R06.00] Dyspnea     12/26/2023  4:36 PM Hue Hanna [D49.4] Bladder tumor           ED Disposition       ED Disposition   Admit    Condition   Stable    Date/Time   Tue Dec 26, 2023  2:03 PM    Comment   Case was discussed with ANTONIO and the patient's admission status was agreed to be Admission Status: inpatient status to the service of Dr. Hanna .               Follow-up Information    None         Current Discharge Medication List        CONTINUE these medications which have NOT CHANGED    Details   acetaminophen (TYLENOL) 325 mg tablet Take 2 tablets (650 mg total) by mouth 3 (three) times a day as needed for mild pain  Refills: 0    Associated Diagnoses: Pain      amLODIPine (NORVASC) 5 mg tablet Take 1 tablet (5 mg total) by mouth 2 (two) times a day  Qty: 60 tablet, Refills: 0    Associated Diagnoses: Benign hypertension with chronic kidney disease, stage IV (HCC)      ascorbic acid (VITAMIN C) 500 MG tablet Take 1 tablet (500 mg total) by mouth daily Do not start before September 29, 2023.  Qty: 30 tablet, Refills: 0    Associated Diagnoses: Anemia      atorvastatin (LIPITOR) 40 mg tablet TAKE ONE TABLET BY MOUTH AT BEDTIME  Qty: 90 tablet, Refills: 3    Associated Diagnoses: Coronary artery disease involving native coronary artery of native heart without angina pectoris      Blood Glucose  Monitoring Suppl (OneTouch Verio Flex System) w/Device KIT Use to check blood sugars daily  Qty: 1 kit, Refills: 0    Associated Diagnoses: Type 2 diabetes mellitus with other circulatory complication, without long-term current use of insulin ([facility])      calcitriol (ROCALTROL) 0.25 mcg capsule Take 1 tablet 2 times a week (Monday, Friday)  Qty: 24 capsule, Refills: 3    Associated Diagnoses: Secondary hyperparathyroidism of renal origin ([facility])      citalopram (CeleXA) 20 mg tablet TAKE ONE TABLET BY MOUTH EVERY DAY  Qty: 30 tablet, Refills: 3    Associated Diagnoses: Anxiety and depression      dapagliflozin (Farxiga) 5 MG TABS Take 1 tablet (5 mg total) by mouth daily  Qty: 90 tablet, Refills: 2    Associated Diagnoses: Type 2 diabetes mellitus with both eyes affected by mild nonproliferative retinopathy without macular edema, without long-term current use of insulin ([facility])      ferrous gluconate (FERGON) 324 mg tablet Take 1 tablet (324 mg total) by mouth daily before breakfast Can take ferrous sulfate Do not start before September 29, 2023.  Qty: 30 tablet, Refills: 0    Associated Diagnoses: Anemia      fluticasone (FLONASE) 50 mcg/act nasal spray 1 spray into each nostril daily Do not start before September 29, 2023.  Qty: 9.9 mL, Refills: 0    Associated Diagnoses: Allergies      folic acid (FOLVITE) 1 mg tablet Take 1 tablet (1 mg total) by mouth daily Do not start before September 29, 2023.  Qty: 30 tablet, Refills: 0    Associated Diagnoses: Anemia      hydrALAZINE (APRESOLINE) 10 mg tablet Take 1 tablet (10 mg total) by mouth 3 (three) times a day  Qty: 90 tablet, Refills: 0    Associated Diagnoses: Benign hypertension with chronic kidney disease, stage IV ([facility])      Lancets (OneTouch Delica Plus [id number]) MISC TEST BLOOD GLUCOSE ONCE DAILY  Qty: 100 each, Refills: 0    Associated Diagnoses: Type 2 diabetes mellitus with other circulatory complication, without long-term current use of insulin ([facility])       loratadine (CLARITIN) 10 mg tablet Take 1 tablet (10 mg total) by mouth daily as needed for allergies  Qty: 30 tablet, Refills: 0    Associated Diagnoses: Allergies      magnesium oxide (MAG-OX) 400 mg Take 1 tablet (400 mg total) by mouth in the morning.  Qty: 180 tablet, Refills: 2    Associated Diagnoses: Hypermagnesemia      melatonin 3 mg Take 2 tablets (6 mg total) by mouth daily at bedtime  Refills: 0    Associated Diagnoses: Insomnia      metoprolol tartrate (LOPRESSOR) 25 mg tablet       Multiple Vitamins-Minerals (OCUVITE-LUTEIN PO) Take 1 tablet by mouth 2 (two) times a day Pt is taking preservision       omeprazole (PriLOSEC) 40 MG capsule TAKE ONE CAPSULE BY MOUTH EVERY MORNING  Qty: 90 capsule, Refills: 3    Associated Diagnoses: Wright's esophagus with esophagitis      OneTouch Verio test strip TEST ONCE A DAY  Qty: 100 strip, Refills: 0    Associated Diagnoses: Type 2 diabetes mellitus with other circulatory complication, without long-term current use of insulin (HCC)      polyethylene glycol (GLYCOLAX) 17 GM/SCOOP powder Take 17 g by mouth daily as needed (Constipation) Can substitute for closest size available  Qty: 507 g, Refills: 0    Associated Diagnoses: Constipation      sodium bicarbonate 650 mg tablet Take 2 tablets (1,300 mg total) by mouth 2 (two) times a day  Qty: 360 tablet, Refills: 3    Associated Diagnoses: Metabolic acidosis      torsemide (DEMADEX) 20 mg tablet Take 1 tablet (20 mg total) by mouth every other day  Qty: 90 tablet, Refills: 0    Associated Diagnoses: Bilateral lower extremity edema      traZODone (DESYREL) 50 mg tablet Take 0.5 tablets (25 mg total) by mouth daily at bedtime as needed for sleep  Qty: 30 tablet, Refills: 0    Associated Diagnoses: Insomnia           No discharge procedures on file.    PDMP Review         Value Time User    PDMP Reviewed  Yes 7/30/2023  2:37 PM Sixto Falcon DO             ED Provider  Attending physically available and evaluated  Sidney Wheeler. I managed the patient along with the ED Attending.    Electronically Signed by           Mando Daley MD  12/26/23 [id number]

## 2023-12-26 NOTE — TELEPHONE ENCOUNTER
Spoke with spouse who reports ongoing nausea/vomiting for 1 week. She reports patient eats and drinks very little, and vomites shortly after. She reports pt is having regular bowel movements. Taking Zofran without relief. She reports he has gotten much weaker, he is unable to get himself out of bed. She reports patient fell on Sunday, she had to call EMS to get him up. She reports feeling unsafe to transport pt.     She was advised to call 911 to transport patient to hospital for evaluation. She was agreeable to this.

## 2023-12-26 NOTE — CONSULTS
Consultation - Cardiology Team One  Sidney Wheeler 83 y.o. male MRN: [id number]  Unit/Bed#: ED 21 Encounter: [id number]    Inpatient consult to Cardiology  Consult performed by: Sharif Griffith PA-C  Consult ordered by: Georgi Morales DO          Physician Requesting Consult: Georgi Morales DO  Reason for Consult / Principal Problem: Elevated troponin    Assessment:    1.  Nonischemic myocardial injury: In the setting of nausea, vomiting, likely rhabdo with underlying CKD.    2.  Generalized weakness: Reports fatigue, nausea, vomiting and mechanical fall where he was unable to get up off the ground for over an hour.    3.  CAD: s/p remote CABG. Maintained on beta-blocker and statin.    4.  ICM: EF 50-55% with basal inferior and mid inferior akinesis, moderately reduced RV function, moderate LA dilatation, mild RA dilatation, mild/moderate MR on echocardiogram 9/2023.  Volume status appears stable    5.  Paroxysmal atrial flutter: Diagnosed 9/2023.  Currently appears to be in sinus rhythm with first-degree AVB on Lopressor 25 mg BID.  [id number]: Was not started on anticoagulation at the time due to SAH    6.  Essential hypertension: Average /79    7.  Dyslipidemia: Lipid panel 10/2022 , TG 80, HDL 61, LDL 46 on atorvastatin 40 mg daily.    8.  Type II DM: Hemoglobin A1c 6.5 in 10/2023.  Management per primary team.    9.  CKD: Baseline creatinine 2.0-2.4.  Creatinine 3.52 POA    10.  SAH /CVA: Status post mechanical fall.  Remains off of aspirin or any anticoagulation.    11.  Ambulatory dysfunction/falls: Patient fell while trying to get out of bed as the walker tipped over patient he denied head strike.  He was unable to get up off the ground for over an hour      Plan/Recommendations:  Will check echocardiogram to evaluate heart function and valvular status  Check CK due to concerns of rhabdomyolysis  Monitor on telemetry  Continue current cardiac  medications  _____________________________________________________________________    CC: Generalized weakness      History of Present Illness   HPI: Sidney Wheeler is a 83 y.o. year old male who has paroxysmal atrial flutter, CAD s/p remote CABG, ICM, essential hypertension, dyslipidemia, type II DM, CKD, SAH 9/2023, history of bladder cancer s/p urostomy, CVA 9/2023 who follows with cardiologist Dr. Vizcarra.  Patient presented to the emergency room at [facility] feeling weak and fatigued with nonproductive cough and decreased appetite.  Arrival to the ED patient's vital signs were stable with oxygen saturation 94% on 3 L NC.  EKG revealed sinus rhythm with first-degree AVB, inferior ST T wave abnormality, and RBBB.  CXR completed with report pending.  CTA is pending.  Labs revealed creatinine 3.52, albumin 3.3, troponin 4705, WBC 17 otherwise stable CBC.  Cardiology has been consulted for further evaluation management of elevated troponin.    Home medication regimen includes amlodipine 5 mg BID, atorvastatin 40 mg daily, hydralazine 10 mg TID, Lopressor 25 mg BID, torsemide 20 mg every other day.    Patient resting in bed during consultation reports 1 week of progressive weakness, decreased appetite, nausea, vomiting.  On Christmas lexi he fell while trying to get out of bed.  He denied any head strike or loss of consciousness.  He was unable to get up off the ground despite his wife trying to help him.  Eventually they called 911 where he was assisted in getting up off the ground.  He complains of left sided lower back pain.  He had some heaviness in his chest yesterday but is unsure if it happened after vomiting.  He denies any orthopnea, PND or lower extremity edema.      Echocardiogram 9/16/2023: EF 50-55% with basal inferior and mid inferior akinesis, moderately reduced RV function, moderate LA dilatation, mild RA dilatation, mild/moderate MR.    EKG reviewed personally: 12/26/2023-sinus  rhythm at a rate of 87 bpm with RBBB with a first-degree AVB, inferior ST T wave abnormality and PACs.  When compared to prior EKG from 9/20/2023 atrial flutter with 4:1 AV conduction was noted.    Telemetry reviewed personally: Sinus rhythm with first-degree AVB and 3 beats NSVT        Review of Systems   Constitutional: Negative. Negative for chills.   Cardiovascular:  Negative for chest pain, dyspnea on exertion, leg swelling, near-syncope, orthopnea, palpitations, paroxysmal nocturnal dyspnea and syncope.   Respiratory: Negative.  Negative for cough, shortness of breath and wheezing.    Endocrine: Negative.    Hematologic/Lymphatic: Negative.    Skin: Negative.    Musculoskeletal:  Positive for back pain.   Gastrointestinal:  Positive for abdominal pain, nausea and vomiting. Negative for diarrhea.   Neurological:  Negative for dizziness, light-headedness and weakness.   Psychiatric/Behavioral: Negative.  Negative for altered mental status.    All other systems reviewed and are negative.    Historical Information   Past Medical History:   Diagnosis Date    Acute kidney injury (HCC)     Anemia Jan. 2022    Arthritis     Hands    Wright esophagus     BPH with obstruction/lower urinary tract symptoms     CAD (coronary artery disease)     Last assessed 09/16/15    Cancer (HCC)     bladder    Cataract, acquired     Last assessed 10/10/17    Chronic kidney disease     Coronary artery disease     Diabetes mellitus (HCC)     NIDDM    Diabetic neuropathy ([facility])     Feet    Enlarged prostate with lower urinary tract symptoms (LUTS)     Last assessed 10/10/17    Erectile dysfunction     GERD (gastroesophageal reflux disease)     Last assessed 10/10/17    Hemoptysis     Last assessed 03/14/16    Hypercholesterolemia     Last assessed 10/10/17    Hypertension     Last assessed 10/10/17    Kidney stone     Macular degeneration     Right eye is particularly affected-peripheral vision intact    Myocardial infarction (HCC) 1998     OAB (overactive bladder)     Testicular hypofunction     Testicular hypogonadism     Last assessed 10/10/17    Tinnitus     Umbilical hernia     Last assessed 06/18/14    Urge incontinence of urine     Wears glasses      Past Surgical History:   Procedure Laterality Date    BLADDER SURGERY      COLONOSCOPY      CORONARY ARTERY BYPASS GRAFT  07/16/2014    ABG x 4 LIMA to LAD,SVG to diagnoal 2 SVG to OM-1, SVG to PDA, resection of partial plerual mass    CT GUIDED PERC DRAINAGE CATHETER PLACEMENT  02/19/2021    CYSTOSCOPY  2013    CYSTOSCOPY  02/08/2022    [place]    ESOPHAGOGASTRODUODENOSCOPY      FL RETROGRADE PYELOGRAM  12/20/2018    FL RETROGRADE PYELOGRAM  12/05/2019    INGUINAL HERNIA REPAIR Bilateral 2015    IR DRAINAGE TUBE CHECK AND/OR REMOVAL  03/05/2021    PHOTODYNAMIC THERAPY      For Barretts esophagus    FL COLONOSCOPY FLX DX W/COLLJ SPEC WHEN PFRMD N/A 04/25/2016    Procedure: COLONOSCOPY;  Surgeon: Yann Hilliard MD;  Location:  GI LAB;  Service: Gastroenterology    FL CYSTO W/REMOVAL OF LESIONS SMALL N/A 12/05/2019    Procedure: CYSTO W/TURBT AND TRANSURETHRAL PROSTATE BIOPSY AND OPENING OF BLADDER NECK CONTRACTURE, B/L Retrograde pyelogram;  Surgeon: Adam Reese MD;  [facility];  Service: Urology    FL CYSTOURETHROSCOPY W/DEST &/RMVL MED BLADDER TY N/A 04/16/2020    Procedure: CYSTOSCOPY, TRANSURETHRAL RESECTION OF BLADDER TUMOR (TURBT);  Surgeon: Adam Reese MD;  [facility];  Service: Urology    FL CYSTOURETHROSCOPY WITH BIOPSY N/A 09/10/2020    Procedure: CYSTOSCOPY, COLLECTION OF LEFT KIDNEY CYTOLOGY, BILATERAL RETROGRADE PYELOGRAM, BLADDER WALL BIOPSIES  AND FULGERAION, RANDOM BLADDER TUMOR BIOPSIES;  Surgeon: Adam Reese MD;  Location:  MAIN OR;  Service: Urology    FL CYSTOURETHROSCOPY WITH BIOPSY N/A 04/05/2022    Procedure: urethroscopy , biopsy of urethra with fulgeration;  Surgeon: Jay Dobson MD;  Location:  MAIN OR;  Service: Urology     PROSTATE SURGERY      TONSILLECTOMY      TRANSURETHRAL RESECTION OF PROSTATE N/A 2018    Procedure: CYSTO, PHOTO SELECTIVE VAPORIZATION OF PROSTATE, B/L RETROGRADE PYELOGRAM, TURBT;  Surgeon: Adam Reese MD;  [facility];  Service: Urology    UMBILICAL HERNIA REPAIR      UPPER GASTROINTESTINAL ENDOSCOPY       Social History     Substance and Sexual Activity   Alcohol Use Not Currently    Alcohol/week: 2.0 standard drinks of alcohol    Types: 1 Glasses of wine, 1 Cans of beer per week    Comment: Non since 7/15/2020     Social History     Substance and Sexual Activity   Drug Use Never     Social History     Tobacco Use   Smoking Status Former    Current packs/day: 0.00    Average packs/day: 1 pack/day for 10.0 years (10.0 ttl pk-yrs)    Types: Cigarettes    Start date: 1962    Quit date: 1972    Years since quittin.0   Smokeless Tobacco Never   Tobacco Comments    Quit at age 32     Family History:   Family History   Problem Relation Age of Onset    Cancer Mother         Topical oral abrasian caused cancer    Other Mother         Digestive System Complications    Diabetes Father     Heart disease Father     Hypertension Father     Coronary artery disease Father     Hyperlipidemia Father        Meds/Allergies   all current active meds have been reviewed, current meds:   Current Facility-Administered Medications   Medication Dose Route Frequency    sodium chloride 0.9 % bolus 500 mL  500 mL Intravenous Once   , and PTA meds:   Prior to Admission Medications   Prescriptions Last Dose Informant Patient Reported? Taking?   Blood Glucose Monitoring Suppl (OneTouch Verio Flex System) w/Device KIT  Self No No   Sig: Use to check blood sugars daily   Lancets (OneTouch Delica Plus [id number]) MISC  Self No No   Sig: TEST BLOOD GLUCOSE ONCE DAILY   Multiple Vitamins-Minerals (OCUVITE-LUTEIN PO)  Self Yes No   Sig: Take 1 tablet by mouth 2 (two) times a day Pt is taking preservision     OneTouch Verio test strip  Self No No   Sig: TEST ONCE A DAY   acetaminophen (TYLENOL) 325 mg tablet  Self No No   Sig: Take 2 tablets (650 mg total) by mouth 3 (three) times a day as needed for mild pain   Patient taking differently: Take 650 mg by mouth if needed for mild pain   amLODIPine (NORVASC) 5 mg tablet  Self No No   Sig: Take 1 tablet (5 mg total) by mouth 2 (two) times a day   ascorbic acid (VITAMIN C) 500 MG tablet  Self No No   Sig: Take 1 tablet (500 mg total) by mouth daily Do not start before 2023.   atorvastatin (LIPITOR) 40 mg tablet  Self No No   Sig: TAKE ONE TABLET BY MOUTH AT BEDTIME   calcitriol (ROCALTROL) 0.25 mcg capsule  Self No No   Sig: Take 1 tablet 2 times a week (Monday, Friday)   Patient taking differently: Take 1 tablet 2 times a week (Tuesday, Friday)   citalopram (CeleXA) 20 mg tablet  Self No No   Sig: TAKE ONE TABLET BY MOUTH EVERY DAY   dapagliflozin (Farxiga) 5 MG TABS   No No   Sig: Take 1 tablet (5 mg total) by mouth daily   ferrous gluconate (FERGON) 324 mg tablet  Self No No   Sig: Take 1 tablet (324 mg total) by mouth daily before breakfast Can take ferrous sulfate Do not start before 2023.   fluticasone (FLONASE) 50 mcg/act nasal spray  Self No No   Si spray into each nostril daily Do not start before 2023.   folic acid (FOLVITE) 1 mg tablet  Self No No   Sig: Take 1 tablet (1 mg total) by mouth daily Do not start before 2023.   hydrALAZINE (APRESOLINE) 10 mg tablet  Self No No   Sig: Take 1 tablet (10 mg total) by mouth 3 (three) times a day   loratadine (CLARITIN) 10 mg tablet  Self No No   Sig: Take 1 tablet (10 mg total) by mouth daily as needed for allergies   magnesium oxide (MAG-OX) 400 mg  Self No No   Sig: Take 1 tablet (400 mg total) by mouth in the morning.   melatonin 3 mg  Self No No   Sig: Take 2 tablets (6 mg total) by mouth daily at bedtime   metoprolol tartrate (LOPRESSOR) 25 mg tablet   Yes  No   omeprazole (PriLOSEC) 40 MG capsule  Self No No   Sig: TAKE ONE CAPSULE BY MOUTH EVERY MORNING   polyethylene glycol (GLYCOLAX) 17 GM/SCOOP powder  Self No No   Sig: Take 17 g by mouth daily as needed (Constipation) Can substitute for closest size available   sodium bicarbonate 650 mg tablet  Self No No   Sig: Take 2 tablets (1,300 mg total) by mouth 2 (two) times a day   torsemide (DEMADEX) 20 mg tablet  Self No No   Sig: Take 1 tablet (20 mg total) by mouth every other day   traZODone (DESYREL) 50 mg tablet  Self No No   Sig: Take 0.5 tablets (25 mg total) by mouth daily at bedtime as needed for sleep      Facility-Administered Medications: None          Allergies   Allergen Reactions    Fentanyl Hallucinations    Morphine And Related Other (See Comments)     While having an MI patient received morphine and had adverse reaction but doesn't know what happened.       Objective   Vitals: Blood pressure 146/79, pulse 94, temperature (!) 97.4 °F (36.3 °C), temperature source Oral, resp. rate 20, SpO2 94%.,     There is no height or weight on file to calculate BMI.,     Systolic (24hrs), Av , Min:146 , Max:146     Diastolic (24hrs), Av, Min:79, Max:79    Wt Readings from Last 3 Encounters:   23 81 kg (178 lb 9.6 oz)   11/10/23 83 kg (183 lb)   23 83 kg (183 lb)      Lab Results   Component Value Date    CREATININE 3.52 (H) 2023    CREATININE 2.41 (H) 2023    CREATININE 2.25 (H) 2023           No intake or output data in the 24 hours ending 23 1257  Weight (last 2 days)       None          Invasive Devices       Peripheral Intravenous Line  Duration             Peripheral IV 23 Left;Ventral (anterior) Forearm <1 day              Drain  Duration             Urostomy Ureterostomy right  days                      Physical Exam  Vitals and nursing note reviewed.   Constitutional:       General: He is not in acute distress.     Appearance: He is well-developed.       Comments: On 2 L NC in NAD   HENT:      Head: Normocephalic and atraumatic.   Neck:      Vascular: No JVD.   Cardiovascular:      Rate and Rhythm: Normal rate and regular rhythm.      Heart sounds: Normal heart sounds. No murmur heard.     No friction rub. No gallop.   Pulmonary:      Effort: Pulmonary effort is normal. No respiratory distress.      Breath sounds: Normal breath sounds. No wheezing or rales.   Chest:      Chest wall: No tenderness.   Abdominal:      General: Bowel sounds are normal. There is no distension.      Palpations: Abdomen is soft.      Tenderness: There is no abdominal tenderness.   Musculoskeletal:         General: No tenderness. Normal range of motion.      Cervical back: Normal range of motion and neck supple.      Right lower leg: No edema.      Left lower leg: No edema.   Skin:     General: Skin is warm and dry.      Coloration: Skin is not pale.      Findings: No erythema.   Neurological:      Mental Status: He is alert and oriented to person, place, and time.   Psychiatric:         Mood and Affect: Mood normal.         Behavior: Behavior normal.         Thought Content: Thought content normal.         Judgment: Judgment normal.           LABORATORY RESULTS:      CBC with diff:   Results from last 7 days   Lab Units 12/26/23  1100   WBC Thousand/uL 17.94*   HEMOGLOBIN g/dL 11.8*   HEMATOCRIT % 38.9   MCV fL 95   PLATELETS Thousands/uL 309   RBC Million/uL 4.11   MCH pg 28.7   MCHC g/dL 30.3*   RDW % 19.4*   MPV fL 11.8   NRBC AUTO /100 WBCs 0       CMP:  Results from last 7 days   Lab Units 12/26/23  1100   POTASSIUM mmol/L 4.8   CHLORIDE mmol/L 101   CO2 mmol/L 18*   BUN mg/dL 57*   CREATININE mg/dL 3.52*   CALCIUM mg/dL 8.7   AST U/L 151*   ALT U/L 95*   ALK PHOS U/L 176*   EGFR ml/min/1.73sq m 15       BMP:  Results from last 7 days   Lab Units 12/26/23  1100   POTASSIUM mmol/L 4.8   CHLORIDE mmol/L 101   CO2 mmol/L 18*   BUN mg/dL 57*   CREATININE mg/dL 3.52*   CALCIUM mg/dL 8.7  "         No results found for: \"NTBNP\"          Lipid Profile:   Lab Results   Component Value Date    CHOL 140 2015    CHOL 147 2015    CHOL 133 2014     Lab Results   Component Value Date    HDL 61 10/25/2022    HDL 64 2021    HDL 66 2021     Lab Results   Component Value Date    LDLCALC 46 10/25/2022    LDLCALC 65 2021    LDLCALC 63 2021     Lab Results   Component Value Date    TRIG 80 10/25/2022    TRIG 69 2021    TRIG 54 2021         Cardiac testing:   Results for orders placed during the hospital encounter of 19    Echo complete with contrast if [place] [address]  [phone]    Transthoracic Echocardiogram  2D, M-mode, Doppler, and Color Doppler    Study date:  2019    Patient: SEAN GALAN  MR number: [id number]  Account number: [id number]  : 1940  Age: 78 years  Gender: Male  Status: Outpatient  [facility]: [address] [place] [address] [facility]  Height: 68 in  Weight: 195 lb  BP: 130/ 80 mmHg    Indications: Coronary artery disease    Diagnoses: I25.10 - Atherosclerotic heart disease of native coronary artery without angina pectoris    Sonographer:  ITZEL Pearson  Primary Physician:  Sixto Falcon DO  Referring Physician:  Herbert Vizcarra MD  Group:  [facility]  Interpreting Physician:  Bucky Garay MD    SUMMARY    LEFT VENTRICLE:  Systolic function was mildly reduced. Ejection fraction was estimated to be 45 %.  There was akinesis of the basal-mid inferior wall(s).  Wall thickness was mildly increased.    LEFT ATRIUM:  The atrium was mildly dilated.    MITRAL VALVE:  There was mild annular calcification.  There was mild regurgitation.    TRICUSPID VALVE:  There was trace regurgitation.    PULMONIC VALVE:  There was trace regurgitation.    HISTORY: PRIOR HISTORY: Hypertension, hyperlipidemia, coronary " artery disease, coronary bypass, former smoker, type 2 diabetes, GERD, acute kidney injury    PROCEDURE: The study was performed in the [address] [place] [address] [facility]. This was a routine study. The transthoracic approach was used. The study included complete 2D imaging, M-mode, complete spectral Doppler, and color Doppler. Image  quality was adequate.    LEFT VENTRICLE: Size was normal. Systolic function was mildly reduced. Ejection fraction was estimated to be 45 %. There was akinesis of the basal-mid inferior wall(s). Wall thickness was mildly increased. No evidence of apical thrombus.  DOPPLER: Left ventricular diastolic function parameters were normal.    RIGHT VENTRICLE: The size was normal. Systolic function was normal. Wall thickness was normal.    LEFT ATRIUM: The atrium was mildly dilated.    RIGHT ATRIUM: Size was normal.    MITRAL VALVE: There was mild annular calcification. Valve structure was normal. There was mild thickening. There was normal leaflet separation. DOPPLER: The transmitral velocity was within the normal range. There was no evidence for  stenosis. There was mild regurgitation.    AORTIC VALVE: The valve was trileaflet. Leaflets exhibited normal thickness and normal cuspal separation. DOPPLER: Transaortic velocity was within the normal range. There was no evidence for stenosis. There was no significant  regurgitation.    TRICUSPID VALVE: The valve structure was normal. There was normal leaflet separation. DOPPLER: The transtricuspid velocity was within the normal range. There was no evidence for stenosis. There was trace regurgitation.    PULMONIC VALVE: Leaflets exhibited normal thickness, no calcification, and normal cuspal separation. DOPPLER: The transpulmonic velocity was within the normal range. There was trace regurgitation.    PERICARDIUM: There was no pericardial effusion. The pericardium was normal in appearance.    AORTA: The root exhibited normal size.    SYSTEMIC VEINS: IVC:  The inferior vena cava was normal in size.    SYSTEM MEASUREMENT TABLES    2D  %FS: 31.88 %  Ao Diam: 3.59 cm  EDV(Teich): 94.97 ml  EF(Cube): 68.39 %  EF(Teich): 60.05 %  ESV(Cube): 29.81 ml  ESV(Teich): 37.93 ml  IVSd: 1.29 cm  LA Area: 21.97 cm2  LA Diam: 4.39 cm  LVEDV MOD A4C: 175.47 ml  LVEF MOD A4C: 58.01 %  LVESV MOD A4C: 73.68 ml  LVIDd: 4.55 cm  LVIDs: 3.1 cm  LVLd A4C: 8.62 cm  LVLs A4C: 7.8 cm  LVPWd: 1.18 cm  RA Area: 16.73 cm2  RV Diam.: 3.94 cm  SV MOD A4C: 101.79 ml  SV(Cube): 64.5 ml  SV(Teich): 57.03 ml    MM  TAPSE: 1.82 cm    PW  AVC: 378.66 ms  E': 0.06 m/s  E/E': 12.3  MV A Cain: 0.7 m/s  MV Dec Burt: 3.2 m/s2  MV DecT: 223.57 ms  MV E Cain: 0.71 m/s  MV E/A Ratio: 1.02    [employer] Accredited Echocardiography Laboratory    Prepared and electronically signed by    Bucyk Garay MD  Signed 02-Jul-2019 14:04:36    No results found for this or any previous visit.    No valid procedures specified.  No results found for this or any previous visit.        Imaging: I have personally reviewed pertinent reports.    No results found.        Counseling / Coordination of Care  Total floor / unit time spent today 45 minutes.  Greater than 50% of total time was spent with the patient and / or family counseling and / or coordination of care.  A description of the counseling / coordination of care: Review of history, current assessment, development of a plan.      Code Status: Prior    ** Please Note: Dragon 360 Dictation voice to text software may have been used in the creation of this document. **

## 2023-12-26 NOTE — ASSESSMENT & PLAN NOTE
Lab Results   Component Value Date    EGFR 15 12/26/2023    EGFR 23 11/13/2023    EGFR 25 11/01/2023    CREATININE 3.52 (H) 12/26/2023    CREATININE 2.41 (H) 11/13/2023    CREATININE 2.25 (H) 11/01/2023   Patient is on hydralazine and amlodipine and also metoprolol as outpatient which we will continue.  Monitor blood pressure

## 2023-12-27 PROBLEM — N18.9 ACUTE KIDNEY INJURY SUPERIMPOSED ON CHRONIC KIDNEY DISEASE: Status: ACTIVE | Noted: 2022-08-10

## 2023-12-27 PROBLEM — J96.01 ACUTE RESPIRATORY FAILURE WITH HYPOXIA (HCC): Status: ACTIVE | Noted: 2023-01-01

## 2023-12-27 PROBLEM — I82.409 DVT (DEEP VENOUS THROMBOSIS) (HCC): Status: ACTIVE | Noted: 2023-01-01

## 2023-12-27 PROBLEM — N17.9 ACUTE KIDNEY INJURY SUPERIMPOSED ON CHRONIC KIDNEY DISEASE: Status: ACTIVE | Noted: 2022-08-10

## 2023-12-27 PROBLEM — S32.019A CLOSED FRACTURE OF FIRST LUMBAR VERTEBRA (HCC): Status: ACTIVE | Noted: 2023-01-01

## 2023-12-27 NOTE — ASSESSMENT & PLAN NOTE
*7 on RA  Patient presented to the hospital with generalized weakness which has been going on for weeks.  Cough and expectoration and also posttussive emesis present.  No fever at home  Leukocytosis noted.  Mildly elevated procalcitonin present.  CT chest c/w pleural effusions  O2 for comfort

## 2023-12-27 NOTE — PROGRESS NOTES
General Cardiology   Progress Note -  Team One   Sidney Wheeler 83 y.o. male MRN: [id number]    Unit/Bed#: [facility] 711-01 Encounter: [id number]    Assessment:    1.  Nonischemic myocardial injury: In the setting of pneumonia, fall and underlying CKD.     2.  Pneumonia: Reports fatigue, nausea, vomiting and mechanical fall where he was unable to get up off the ground for over an hour.  CXR with RLL pneumonia.  Leukocytosis, negative lactic acid, elevated procalcitonin.  Management per primary team.     3.  CAD: s/p remote CABG. Maintained on beta-blocker and statin.     4.  ICM: EF 50-55% with basal inferior and mid inferior akinesis, moderately reduced RV function, moderate LA dilatation, mild RA dilatation, mild/moderate MR on echocardiogram 9/2023.  Volume status appears stable     5.  Paroxysmal atrial flutter: Diagnosed 9/2023.  Currently appears to be in sinus rhythm with first-degree AVB on Lopressor 25 mg BID.  [id number]: Was not started on anticoagulation at the time due to SAH     6.  Essential hypertension: Average /92 on Lopressor 25 mg BID, hydralazine 10 mg TID and amlodipine 5 mg BID     7.  Dyslipidemia: Lipid panel 10/2022 , TG 80, HDL 61, LDL 46 on atorvastatin 40 mg daily.     8.  Type II DM: Hemoglobin A1c 6.5 in 10/2023.  Management per primary team.     9.  CKD: Baseline creatinine 2.0-2.4.  Creatinine 3.52 POA--> 3.65 today     10.  SAH /CVA: Status post mechanical fall.  Remains off of aspirin or any anticoagulation.     11.  Ambulatory dysfunction/falls: Patient fell while trying to get out of bed as the walker tipped over patient he denied head strike.  He was unable to get up off the ground for over an hour.    12.  Bladder cancer with mets: Patient has elected for hospice care this morning.        Plan/Recommendations:  Consider increasing lopressor to 50 mg BID to optimize Bps  Will cancel echocardiogram per patient preference  Patient dose not wish to follow up in the  office in hopes to minimize appointments  _____________________________________________________________________    Subjective    Patient seen and examined.  No acute events overnight.    Review of Systems   Constitutional: Positive for malaise/fatigue. Negative for chills.   Cardiovascular:  Negative for chest pain, dyspnea on exertion, leg swelling, near-syncope, orthopnea, palpitations, paroxysmal nocturnal dyspnea and syncope.   Respiratory: Negative.  Negative for cough, shortness of breath and wheezing.    Endocrine: Negative.    Hematologic/Lymphatic: Negative.    Skin: Negative.    Musculoskeletal: Negative.    Gastrointestinal: Negative.  Negative for diarrhea, nausea and vomiting.   Neurological:  Negative for dizziness, light-headedness and weakness.   Psychiatric/Behavioral: Negative.  Negative for altered mental status.    All other systems reviewed and are negative.      Objective:   Vitals: Blood pressure 153/86, pulse 76, temperature 97.8 °F (36.6 °C), resp. rate 18, weight 82.3 kg (181 lb 7 oz), SpO2 93%.,     Wt Readings from Last 3 Encounters:   23 82.3 kg (181 lb 7 oz)   23 81 kg (178 lb 9.6 oz)   11/10/23 83 kg (183 lb)        Lab Results   Component Value Date    CREATININE 3.65 (H) 2023    CREATININE 3.52 (H) 2023    CREATININE 2.41 (H) 2023         Body mass index is 28.42 kg/m².,     Systolic (24hrs), Av , Min:143 , Max:161     Diastolic (24hrs), Av, Min:79, Max:102          Intake/Output Summary (Last 24 hours) at 2023 0953  Last data filed at 2023 0501  Gross per 24 hour   Intake 650 ml   Output 700 ml   Net -50 ml     Weight (last 2 days)       Date/Time Weight    23 0532 82.3 (181.44)          Telemetry Review: No significant arrhythmias seen on telemetry review.       Physical Exam  Vitals and nursing note reviewed.   Constitutional:       General: He is not in acute distress.     Appearance: He is well-developed.   HENT:       Head: Normocephalic and atraumatic.   Neck:      Vascular: No JVD.   Cardiovascular:      Rate and Rhythm: Normal rate and regular rhythm.      Heart sounds: Normal heart sounds. No murmur heard.     No friction rub. No gallop.   Pulmonary:      Effort: Pulmonary effort is normal. No respiratory distress.      Breath sounds: Normal breath sounds. No wheezing or rales.   Chest:      Chest wall: No tenderness.   Abdominal:      General: Bowel sounds are normal. There is no distension.      Palpations: Abdomen is soft.      Tenderness: There is no abdominal tenderness.   Musculoskeletal:         General: No tenderness. Normal range of motion.      Cervical back: Normal range of motion and neck supple.      Right lower leg: No edema.      Left lower leg: No edema.   Skin:     General: Skin is warm and dry.      Coloration: Skin is not pale.      Findings: No erythema.   Neurological:      Mental Status: He is alert and oriented to person, place, and time.   Psychiatric:         Mood and Affect: Mood normal.         Behavior: Behavior normal.         Thought Content: Thought content normal.         Judgment: Judgment normal.         LABORATORY RESULTS  Results from last 7 days   Lab Units 12/26/23  1100   CK TOTAL U/L 121     CBC with diff:   Results from last 7 days   Lab Units 12/27/23  0456 12/26/23  1100   WBC Thousand/uL 18.95* 17.94*   HEMOGLOBIN g/dL 11.4* 11.8*   HEMATOCRIT % 37.8 38.9   MCV fL 93 95   PLATELETS Thousands/uL 277 309   RBC Million/uL 4.05 4.11   MCH pg 28.1 28.7   MCHC g/dL 30.2* 30.3*   RDW % 19.6* 19.4*   MPV fL 12.2 11.8   NRBC AUTO /100 WBCs  --  0       CMP:  Results from last 7 days   Lab Units 12/27/23  0456 12/26/23  1100   POTASSIUM mmol/L 5.1 4.8   CHLORIDE mmol/L 99 101   CO2 mmol/L 17* 18*   BUN mg/dL 63* 57*   CREATININE mg/dL 3.65* 3.52*   CALCIUM mg/dL 8.8 8.7   AST U/L 151* 151*   ALT U/L 111* 95*   ALK PHOS U/L 157* 176*   EGFR ml/min/1.73sq m 14 15       BMP:  Results from last 7  "days   Lab Units 23  0456 23  1100   POTASSIUM mmol/L 5.1 4.8   CHLORIDE mmol/L 99 101   CO2 mmol/L 17* 18*   BUN mg/dL 63* 57*   CREATININE mg/dL 3.65* 3.52*   CALCIUM mg/dL 8.8 8.7       No results found for: \"NTBNP\"         Results from last 7 days   Lab Units 23  1237   INR  1.29*       Lipid Profile:   Lab Results   Component Value Date    CHOL 140 2015    CHOL 147 2015    CHOL 133 2014     Lab Results   Component Value Date    HDL 61 10/25/2022    HDL 64 2021    HDL 66 2021     Lab Results   Component Value Date    LDLCALC 46 10/25/2022    LDLCALC 65 2021    LDLCALC 63 2021     Lab Results   Component Value Date    TRIG 80 10/25/2022    TRIG 69 2021    TRIG 54 2021       Cardiac testing:   Results for orders placed during the hospital encounter of 19    Echo complete with contrast if indicated    [address] [address]  [phone]    Transthoracic Echocardiogram  2D, M-mode, Doppler, and Color Doppler    Study date:  2019    Patient: SEAN GALAN  MR number: [id number]  Account number: [id number]  : 1940  Age: 78 years  Gender: Male  Status: Outpatient  Location: [address] [place] [address] [facility]  Height: 68 in  Weight: 195 lb  BP: 130/ 80 mmHg    Indications: Coronary artery disease    Diagnoses: I25.10 - Atherosclerotic heart disease of native coronary artery without angina pectoris    Sonographer:  ITZEL Pearson  Primary Physician:  Sixto Falcon DO  Referring Physician:  Herbert Vizcarra MD  Group:  [facility]  Interpreting Physician:  Bucky Garay MD    SUMMARY    LEFT VENTRICLE:  Systolic function was mildly reduced. Ejection fraction was estimated to be 45 %.  There was akinesis of the basal-mid inferior wall(s).  Wall thickness was mildly increased.    LEFT ATRIUM:  The atrium was mildly " dilated.    MITRAL VALVE:  There was mild annular calcification.  There was mild regurgitation.    TRICUSPID VALVE:  There was trace regurgitation.    PULMONIC VALVE:  There was trace regurgitation.    HISTORY: PRIOR HISTORY: Hypertension, hyperlipidemia, coronary artery disease, coronary bypass, former smoker, type 2 diabetes, GERD, acute kidney injury    PROCEDURE: The study was performed in the [address] [place] [address] [facility]. This was a routine study. The transthoracic approach was used. The study included complete 2D imaging, M-mode, complete spectral Doppler, and color Doppler. Image  quality was adequate.    LEFT VENTRICLE: Size was normal. Systolic function was mildly reduced. Ejection fraction was estimated to be 45 %. There was akinesis of the basal-mid inferior wall(s). Wall thickness was mildly increased. No evidence of apical thrombus.  DOPPLER: Left ventricular diastolic function parameters were normal.    RIGHT VENTRICLE: The size was normal. Systolic function was normal. Wall thickness was normal.    LEFT ATRIUM: The atrium was mildly dilated.    RIGHT ATRIUM: Size was normal.    MITRAL VALVE: There was mild annular calcification. Valve structure was normal. There was mild thickening. There was normal leaflet separation. DOPPLER: The transmitral velocity was within the normal range. There was no evidence for  stenosis. There was mild regurgitation.    AORTIC VALVE: The valve was trileaflet. Leaflets exhibited normal thickness and normal cuspal separation. DOPPLER: Transaortic velocity was within the normal range. There was no evidence for stenosis. There was no significant  regurgitation.    TRICUSPID VALVE: The valve structure was normal. There was normal leaflet separation. DOPPLER: The transtricuspid velocity was within the normal range. There was no evidence for stenosis. There was trace regurgitation.    PULMONIC VALVE: Leaflets exhibited normal thickness, no calcification, and normal cuspal  separation. DOPPLER: The transpulmonic velocity was within the normal range. There was trace regurgitation.    PERICARDIUM: There was no pericardial effusion. The pericardium was normal in appearance.    AORTA: The root exhibited normal size.    SYSTEMIC VEINS: IVC: The inferior vena cava was normal in size.    SYSTEM MEASUREMENT TABLES    2D  %FS: 31.88 %  Ao Diam: 3.59 cm  EDV(Teich): 94.97 ml  EF(Cube): 68.39 %  EF(Teich): 60.05 %  ESV(Cube): 29.81 ml  ESV(Teich): 37.93 ml  IVSd: 1.29 cm  LA Area: 21.97 cm2  LA Diam: 4.39 cm  LVEDV MOD A4C: 175.47 ml  LVEF MOD A4C: 58.01 %  LVESV MOD A4C: 73.68 ml  LVIDd: 4.55 cm  LVIDs: 3.1 cm  LVLd A4C: 8.62 cm  LVLs A4C: 7.8 cm  LVPWd: 1.18 cm  RA Area: 16.73 cm2  RV Diam.: 3.94 cm  SV MOD A4C: 101.79 ml  SV(Cube): 64.5 ml  SV(Teich): 57.03 ml    MM  TAPSE: 1.82 cm    PW  AVC: 378.66 ms  E': 0.06 m/s  E/E': 12.3  MV A Cain: 0.7 m/s  MV Dec Brule: 3.2 m/s2  MV DecT: 223.57 ms  MV E Cain: 0.71 m/s  MV E/A Ratio: 1.02    [facility] Accredited Echocardiography Laboratory    Prepared and electronically signed by    Bucky Garay MD  Signed 02-Jul-2019 14:04:36    No results found for this or any previous visit.    No results found for this or any previous visit.    No valid procedures specified.  No results found for this or any previous visit.        Meds/Allergies   all current active meds have been reviewed, current meds:   Current Facility-Administered Medications   Medication Dose Route Frequency    acetaminophen (TYLENOL) tablet 650 mg  650 mg Oral Q4H PRN    amLODIPine (NORVASC) tablet 5 mg  5 mg Oral BID    ascorbic acid (VITAMIN C) tablet 500 mg  500 mg Oral Daily    atorvastatin (LIPITOR) tablet 40 mg  40 mg Oral HS    calcitriol (ROCALTROL) capsule 0.25 mcg  0.25 mcg Oral Once per day on Tuesday Friday    citalopram (CeleXA) tablet 20 mg  20 mg Oral Daily    docusate sodium (COLACE) capsule 100 mg  100 mg Oral BID    ferrous gluconate (FERGON) tablet 324  mg  324 mg Oral Daily Before Breakfast    fluticasone (FLONASE) 50 mcg/act nasal spray 1 spray  1 spray Nasal Daily    folic acid (FOLVITE) tablet 1 mg  1 mg Oral Daily    hydrALAZINE (APRESOLINE) tablet 10 mg  10 mg Oral TID    insulin lispro (HumaLOG) 100 units/mL subcutaneous injection 1-6 Units  1-6 Units Subcutaneous TID AC    loratadine (CLARITIN) tablet 10 mg  10 mg Oral Daily PRN    magnesium Oxide (MAG-OX) tablet 400 mg  400 mg Oral Daily    melatonin tablet 6 mg  6 mg Oral HS    metoprolol tartrate (LOPRESSOR) tablet 25 mg  25 mg Oral Q12H MARIA LUZ    multivitamin-minerals (CENTRUM) tablet 1 tablet  1 tablet Oral BID    ondansetron (ZOFRAN) injection 4 mg  4 mg Intravenous Q6H PRN    pantoprazole (PROTONIX) EC tablet 40 mg  40 mg Oral Early Morning    polyethylene glycol (MIRALAX) packet 17 g  17 g Oral Daily    senna (SENOKOT) tablet 8.6 mg  1 tablet Oral Daily    simethicone (MYLICON) chewable tablet 80 mg  80 mg Oral 4x Daily PRN    sodium bicarbonate tablet 1,300 mg  1,300 mg Oral BID    traZODone (DESYREL) tablet 25 mg  25 mg Oral HS PRN   , and PTA meds:   Prior to Admission Medications   Prescriptions Last Dose Informant Patient Reported? Taking?   Blood Glucose Monitoring Suppl (OneTouch Verio Flex System) w/Device KIT  Self No No   Sig: Use to check blood sugars daily   Lancets (OneTouch Delica Plus [id number]) MISC  Self No No   Sig: TEST BLOOD GLUCOSE ONCE DAILY   Multiple Vitamins-Minerals (OCUVITE-LUTEIN PO)  Self Yes No   Sig: Take 1 tablet by mouth 2 (two) times a day Pt is taking preservision    OneTouch Verio test strip  Self No No   Sig: TEST ONCE A DAY   acetaminophen (TYLENOL) 325 mg tablet  Self No No   Sig: Take 2 tablets (650 mg total) by mouth 3 (three) times a day as needed for mild pain   Patient taking differently: Take 650 mg by mouth if needed for mild pain   amLODIPine (NORVASC) 5 mg tablet  Self No No   Sig: Take 1 tablet (5 mg total) by mouth 2 (two) times a day   ascorbic acid  (VITAMIN C) 500 MG tablet  Self No No   Sig: Take 1 tablet (500 mg total) by mouth daily Do not start before 2023.   atorvastatin (LIPITOR) 40 mg tablet  Self No No   Sig: TAKE ONE TABLET BY MOUTH AT BEDTIME   calcitriol (ROCALTROL) 0.25 mcg capsule  Self No No   Sig: Take 1 tablet 2 times a week (Monday, Friday)   Patient taking differently: Take 1 tablet 2 times a week (Tuesday, Friday)   citalopram (CeleXA) 20 mg tablet  Self No No   Sig: TAKE ONE TABLET BY MOUTH EVERY DAY   dapagliflozin (Farxiga) 5 MG TABS   No No   Sig: Take 1 tablet (5 mg total) by mouth daily   ferrous gluconate (FERGON) 324 mg tablet  Self No No   Sig: Take 1 tablet (324 mg total) by mouth daily before breakfast Can take ferrous sulfate Do not start before 2023.   fluticasone (FLONASE) 50 mcg/act nasal spray  Self No No   Si spray into each nostril daily Do not start before 2023.   folic acid (FOLVITE) 1 mg tablet  Self No No   Sig: Take 1 tablet (1 mg total) by mouth daily Do not start before 2023.   hydrALAZINE (APRESOLINE) 10 mg tablet  Self No No   Sig: Take 1 tablet (10 mg total) by mouth 3 (three) times a day   loratadine (CLARITIN) 10 mg tablet  Self No No   Sig: Take 1 tablet (10 mg total) by mouth daily as needed for allergies   magnesium oxide (MAG-OX) 400 mg  Self No No   Sig: Take 1 tablet (400 mg total) by mouth in the morning.   melatonin 3 mg  Self No No   Sig: Take 2 tablets (6 mg total) by mouth daily at bedtime   metoprolol tartrate (LOPRESSOR) 25 mg tablet   Yes No   omeprazole (PriLOSEC) 40 MG capsule  Self No No   Sig: TAKE ONE CAPSULE BY MOUTH EVERY MORNING   polyethylene glycol (GLYCOLAX) 17 GM/SCOOP powder  Self No No   Sig: Take 17 g by mouth daily as needed (Constipation) Can substitute for closest size available   sodium bicarbonate 650 mg tablet  Self No No   Sig: Take 2 tablets (1,300 mg total) by mouth 2 (two) times a day   torsemide (DEMADEX) 20 mg  tablet  Self No No   Sig: Take 1 tablet (20 mg total) by mouth every other day   traZODone (DESYREL) 50 mg tablet  Self No No   Sig: Take 0.5 tablets (25 mg total) by mouth daily at bedtime as needed for sleep      Facility-Administered Medications: None     Medications Prior to Admission   Medication    acetaminophen (TYLENOL) 325 mg tablet    amLODIPine (NORVASC) 5 mg tablet    ascorbic acid (VITAMIN C) 500 MG tablet    atorvastatin (LIPITOR) 40 mg tablet    Blood Glucose Monitoring Suppl (OneTouch Verio Flex System) w/Device KIT    calcitriol (ROCALTROL) 0.25 mcg capsule    citalopram (CeleXA) 20 mg tablet    dapagliflozin (Farxiga) 5 MG TABS    ferrous gluconate (FERGON) 324 mg tablet    fluticasone (FLONASE) 50 mcg/act nasal spray    folic acid (FOLVITE) 1 mg tablet    hydrALAZINE (APRESOLINE) 10 mg tablet    Lancets (OneTouch Delica Plus [id number]) MISC    loratadine (CLARITIN) 10 mg tablet    magnesium oxide (MAG-OX) 400 mg    melatonin 3 mg    metoprolol tartrate (LOPRESSOR) 25 mg tablet    Multiple Vitamins-Minerals (OCUVITE-LUTEIN PO)    omeprazole (PriLOSEC) 40 MG capsule    OneTouch Verio test strip    polyethylene glycol (GLYCOLAX) 17 GM/SCOOP powder    sodium bicarbonate 650 mg tablet    torsemide (DEMADEX) 20 mg tablet    traZODone (DESYREL) 50 mg tablet            Counseling / Coordination of Care  Total floor / unit time spent today 20 minutes.  Greater than 50% of total time was spent with the patient and / or family counseling and / or coordination of care.      ** Please Note: Dragon 360 Dictation voice to text software may have been used in the creation of this document. **

## 2023-12-27 NOTE — PHYSICAL THERAPY NOTE
PHYSICAL THERAPY CANCEL NOTE          Patient Name: Sidney Wheeler  Today's Date: 12/27/2023 12/27/23 1225   PT Last Visit   PT Visit Date 12/27/23   Note Type   Note type Cancelled Session   Cancel Reasons Medical status   Additional Comments Pt has transitioned to Level 4 comfort care. Will sign off acute care PT at this time. Thank you.     Musa Nelson, PT, DPT

## 2023-12-27 NOTE — ASSESSMENT & PLAN NOTE
With known history of coronary artery disease status post CABG.  Patient is on statin and beta-blocker.  Likely aspirin discontinued due to recent fall and subdural/subarachnoid hemorrhage.  Patient with elevated troponin and cardiology evaluation appreciated  Echo cancelled w/ pt on hospice

## 2023-12-27 NOTE — ASSESSMENT & PLAN NOTE
Patient with elevated D-dimer.  Patient also with a worsening malignancy/CKD.    Denies any shortness of breath  Given creatinine of 3.5 will hold of ordering CTA.  LEVD w/ LLE distal DVT - no ACE due to prior SAH and pt going on hospice

## 2023-12-27 NOTE — ASSESSMENT & PLAN NOTE
Patient noted to have new onset transaminitis.  No abdominal pain on examination.  CT abdomen and pelvis w/ liver mets

## 2023-12-27 NOTE — ASSESSMENT & PLAN NOTE
Lab Results   Component Value Date    HGBA1C 6.5 (H) 10/13/2023       Recent Labs     12/26/23  1630 12/26/23  2132 12/27/23  0614 12/27/23  1128   POCGLU 160* 154* 172* 170*       Blood Sugar Average: Last 72 hrs:  (P) 164Is on Farxiga as outpatient.  Hold oral hypoglycemic agent.    Stop ISS w/ pt hon hospice

## 2023-12-27 NOTE — DISCHARGE INSTR - OTHER ORDERS
Skin care plans:  1-Hydraguard to bilateral sacrum, buttock and heels BID and PRN  2-Elevate heels to offload pressure.  3-Ehob cushion in chair when out of bed.  4-Moisturize skin daily with skin nourishing cream.  5-Turn/reposition q2h or when medically stable for pressure re-distribution on skin.   6-Apply 3m no sting to left arm scab daily

## 2023-12-27 NOTE — ASSESSMENT & PLAN NOTE
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 CT scan on December 2022 showed no evidence of disease in the abdomen or pelvis   Repeat imaging done recently showed a worsening of the disease process.  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  CT CAP worsening of malignancy - HemOnc appreciated.  Pt opted for comfort care/hospice

## 2023-12-27 NOTE — PLAN OF CARE
Problem: Potential for Falls  Goal: Patient will remain free of falls  Description: INTERVENTIONS:  - Educate patient/family on patient safety including physical limitations  - Instruct patient to call for assistance with activity   - Consult OT/PT to assist with strengthening/mobility   - Keep Call bell within reach  - Keep bed low and locked with side rails adjusted as appropriate  - Keep care items and personal belongings within reach  - Initiate and maintain comfort rounds  - Make Fall Risk Sign visible to staff  - Offer Toileting every 2 Hours, in advance of need  - Initiate/Maintain bed/chair alarm  - Obtain necessary fall risk management equipment.  - Apply yellow socks and bracelet for high fall risk patients  - Consider moving patient to room near nurses station  Outcome: Progressing     Problem: Prexisting or High Potential for Compromised Skin Integrity  Goal: Skin integrity is maintained or improved  Description: INTERVENTIONS:  - Identify patients at risk for skin breakdown  - Assess and monitor skin integrity  - Assess and monitor nutrition and hydration status  - Monitor labs   - Assess for incontinence   - Turn and reposition patient  - Assist with mobility/ambulation  - Relieve pressure over bony prominences  - Avoid friction and shearing  - Provide appropriate hygiene as needed including keeping skin clean and dry  - Evaluate need for skin moisturizer/barrier cream  - Collaborate with interdisciplinary team   - Patient/family teaching  - Consider wound care consult   Outcome: Progressing     Problem: PAIN - ADULT  Goal: Verbalizes/displays adequate comfort level or baseline comfort level  Description: Interventions:  - Encourage patient to monitor pain and request assistance  - Assess pain using appropriate pain scale  - Administer analgesics based on type and severity of pain and evaluate response  - Implement non-pharmacological measures as appropriate and evaluate response  - Notify  physician/advanced practitioner if interventions unsuccessful or patient reports new pain  Outcome: Progressing     Problem: INFECTION - ADULT  Goal: Absence or prevention of progression during hospitalization  Description: INTERVENTIONS:  - Assess and monitor for signs and symptoms of infection  - Monitor lab/diagnostic results  - Monitor all insertion sites, i.e. indwelling lines, tubes, and drains  - [place] appropriate cooling/warming therapies per order  - Administer medications as ordered  - Instruct and encourage patient and family to use good hand hygiene technique  - Identify and instruct in appropriate isolation precautions for identified infection/condition  Outcome: Progressing     Problem: SAFETY ADULT  Goal: Patient will remain free of falls  Description: INTERVENTIONS:  - Educate patient/family on patient safety including physical limitations  - Instruct patient to call for assistance with activity   - Consult OT/PT to assist with strengthening/mobility   - Keep Call bell within reach  - Keep bed low and locked with side rails adjusted as appropriate  - Keep care items and personal belongings within reach  - Initiate and maintain comfort rounds  - Make Fall Risk Sign visible to staff  - Offer Toileting every 2 Hours, in advance of need  - Initiate/Maintain bed/chair alarm  - Obtain necessary fall risk management equipment.  - Apply yellow socks and bracelet for high fall risk patients  - Consider moving patient to room near nurses station  Outcome: Progressing  Goal: Maintain or return to baseline ADL function  Description: INTERVENTIONS:  -  Assess patient's ability to carry out ADLs; assess patient's baseline for ADL function and identify physical deficits which impact ability to perform ADLs (bathing, care of mouth/teeth, toileting, grooming, dressing, etc.)  - Assess/evaluate cause of self-care deficits   - Assess range of motion  - Assess patient's mobility; develop plan if impaired  - Assess  patient's need for assistive devices and provide as appropriate  - Encourage maximum independence but intervene and supervise when necessary  - Involve family in performance of ADLs  - Assess for home care needs following discharge   - Consider OT consult to assist with ADL evaluation and planning for discharge  - Provide patient education as appropriate  Outcome: Progressing

## 2023-12-27 NOTE — ASSESSMENT & PLAN NOTE
Patient with history of a flutter.  Currently in sinus rhythm.  Not on anticoagulation due to fall risk  BB for comfort

## 2023-12-27 NOTE — PLAN OF CARE
Problem: Potential for Falls  Goal: Patient will remain free of falls  Description: INTERVENTIONS:  - Educate patient/family on patient safety including physical limitations  - Instruct patient to call for assistance with activity   - Consult OT/PT to assist with strengthening/mobility   - Keep Call bell within reach  - Keep bed low and locked with side rails adjusted as appropriate  - Keep care items and personal belongings within reach  - Initiate and maintain comfort rounds  - Make Fall Risk Sign visible to staff  - Offer Toileting every 2 Hours, in advance of need  - Initiate/Maintain bed/chair alarm  - Obtain necessary fall risk management equipment.  - Apply yellow socks and bracelet for high fall risk patients  - Consider moving patient to room near nurses station  Outcome: Progressing     Problem: Prexisting or High Potential for Compromised Skin Integrity  Goal: Skin integrity is maintained or improved  Description: INTERVENTIONS:  - Identify patients at risk for skin breakdown  - Assess and monitor skin integrity  - Assess and monitor nutrition and hydration status  - Monitor labs   - Assess for incontinence   - Turn and reposition patient  - Assist with mobility/ambulation  - Relieve pressure over bony prominences  - Avoid friction and shearing  - Provide appropriate hygiene as needed including keeping skin clean and dry  - Evaluate need for skin moisturizer/barrier cream  - Collaborate with interdisciplinary team   - Patient/family teaching  - Consider wound care consult   Outcome: Progressing     Problem: PAIN - ADULT  Goal: Verbalizes/displays adequate comfort level or baseline comfort level  Description: Interventions:  - Encourage patient to monitor pain and request assistance  - Assess pain using appropriate pain scale  - Administer analgesics based on type and severity of pain and evaluate response  - Implement non-pharmacological measures as appropriate and evaluate response  - Notify  physician/advanced practitioner if interventions unsuccessful or patient reports new pain  Outcome: Progressing     Problem: INFECTION - ADULT  Goal: Absence or prevention of progression during hospitalization  Description: INTERVENTIONS:  - Assess and monitor for signs and symptoms of infection  - Monitor lab/diagnostic results  - Monitor all insertion sites, i.e. indwelling lines, tubes, and drains  - [place] appropriate cooling/warming therapies per order  - Administer medications as ordered  - Instruct and encourage patient and family to use good hand hygiene technique  - Identify and instruct in appropriate isolation precautions for identified infection/condition  Outcome: Progressing     Problem: SAFETY ADULT  Goal: Patient will remain free of falls  Description: INTERVENTIONS:  - Educate patient/family on patient safety including physical limitations  - Instruct patient to call for assistance with activity   - Consult OT/PT to assist with strengthening/mobility   - Keep Call bell within reach  - Keep bed low and locked with side rails adjusted as appropriate  - Keep care items and personal belongings within reach  - Initiate and maintain comfort rounds  - Make Fall Risk Sign visible to staff  - Offer Toileting every 2 Hours, in advance of need  - Initiate/Maintain bed/chair alarm  - Obtain necessary fall risk management equipment.  - Apply yellow socks and bracelet for high fall risk patients  - Consider moving patient to room near nurses station  Outcome: Progressing  Goal: Maintain or return to baseline ADL function  Description: INTERVENTIONS:  -  Assess patient's ability to carry out ADLs; assess patient's baseline for ADL function and identify physical deficits which impact ability to perform ADLs (bathing, care of mouth/teeth, toileting, grooming, dressing, etc.)  - Assess/evaluate cause of self-care deficits   - Assess range of motion  - Assess patient's mobility; develop plan if impaired  - Assess  patient's need for assistive devices and provide as appropriate  - Encourage maximum independence but intervene and supervise when necessary  - Involve family in performance of ADLs  - Assess for home care needs following discharge   - Consider OT consult to assist with ADL evaluation and planning for discharge  - Provide patient education as appropriate  Outcome: Progressing     Problem: DISCHARGE PLANNING  Goal: Discharge to home or other facility with appropriate resources  Description: INTERVENTIONS:  - Identify barriers to discharge w/patient and caregiver  - Arrange for needed discharge resources and transportation as appropriate  - Identify discharge learning needs (meds, wound care, etc.)  - Refer to Case Management Department for coordinating discharge planning if the patient needs post-hospital services based on physician/advanced practitioner order or complex needs related to functional status, cognitive ability, or social support system  Outcome: Progressing     Problem: Knowledge Deficit  Goal: Patient/family/caregiver demonstrates understanding of disease process, treatment plan, medications, and discharge instructions  Description: Complete learning assessment and assess knowledge base.  Interventions:  - Provide teaching at level of understanding  - Provide teaching via preferred learning methods  Outcome: Progressing     Problem: CARDIOVASCULAR - ADULT  Goal: Maintains optimal cardiac output and hemodynamic stability  Description: INTERVENTIONS:  - Monitor I/O, vital signs and rhythm  - Monitor for S/S and trends of decreased cardiac output  - Administer and titrate ordered vasoactive medications to optimize hemodynamic stability  - Assess quality of pulses, skin color and temperature  - Assess for signs of decreased coronary artery perfusion  - Instruct patient to report change in severity of symptoms  Outcome: Progressing     Problem: RESPIRATORY - ADULT  Goal: Achieves optimal ventilation and  oxygenation  Description: INTERVENTIONS:  - Assess for changes in respiratory status  - Assess for changes in mentation and behavior  - Position to facilitate oxygenation and minimize respiratory effort  - Oxygen administered by appropriate delivery if ordered  - Initiate smoking cessation education as indicated  - Encourage broncho-pulmonary hygiene including cough, deep breathe, Incentive Spirometry  - Assess the need for suctioning and aspirate as needed  - Assess and instruct to report SOB or any respiratory difficulty  - Respiratory Therapy support as indicated  Outcome: Progressing     Problem: METABOLIC, FLUID AND ELECTROLYTES - ADULT  Goal: Glucose maintained within target range  Description: INTERVENTIONS:  - Monitor Blood Glucose as ordered  - Assess for signs and symptoms of hyperglycemia and hypoglycemia  - Administer ordered medications to maintain glucose within target range  - Assess nutritional intake and initiate nutrition service referral as needed  Outcome: Progressing     Problem: SKIN/TISSUE INTEGRITY - ADULT  Goal: Incision(s), wounds(s) or drain site(s) healing without S/S of infection  Description: INTERVENTIONS  - Assess and document dressing, incision, wound bed, drain sites and surrounding tissue  - Provide patient and family education  - Perform skin care/dressing changes daily  Outcome: Progressing     Problem: MUSCULOSKELETAL - ADULT  Goal: Maintain or return mobility to safest level of function  Description: INTERVENTIONS:  - Assess patient's ability to carry out ADLs; assess patient's baseline for ADL function and identify physical deficits which impact ability to perform ADLs (bathing, care of mouth/teeth, toileting, grooming, dressing, etc.)  - Assess/evaluate cause of self-care deficits   - Assess range of motion  - Assess patient's mobility  - Assess patient's need for assistive devices and provide as appropriate  - Encourage maximum independence but intervene and supervise when  necessary  - Involve family in performance of ADLs  - Assess for home care needs following discharge   - Consider OT consult to assist with ADL evaluation and planning for discharge  - Provide patient education as appropriate  Outcome: Progressing

## 2023-12-27 NOTE — WOUND OSTOMY CARE
Consult Note - Wound   Sidney Wheeler 83 y.o. male MRN: [id number]  Unit/Bed#: [facility] 711-01 Encounter: [id number]        Assessment Findings:   Patient seen today for wound care consult for left arm skin tear. Area is dry and scabbed. Patient has no other wounds noted on assessment. Skin care orders placed. Wound care will sign off at this time..        Skin care plans:  1-Hydraguard to bilateral sacrum, buttock and heels BID and PRN  2-Elevate heels to offload pressure.  3-Ehob cushion in chair when out of bed.  4-Moisturize skin daily with skin nourishing cream.  5-Turn/reposition q2h or when medically stable for pressure re-distribution on skin.   6-Apply 3m no sting to left arm scab daily      Wounds:  Wound 12/26/23 Traumatic Skin tear Arm Left (Active)   Wound Image   12/27/23 1056   Wound Description Beefy red 12/27/23 1100   Shruthi-wound Assessment Fragile;Erythema 12/27/23 1100   Wound Site Closure Open to air 12/27/23 1100   Drainage Amount None 12/27/23 1100   Dressing Open to air 12/27/23 1100             Olivia Brody BSN, RN, CWOCN

## 2023-12-27 NOTE — Clinical Note
I have a home hospice referral at [facility]. Delbert Wheeler ([birth year]) is a 82yo male admitted with generalized weakness. Has a history of metastatic bladder cancer diagnosed in 2019, multiple lines of treatment and surgery in 2023. Ct shows disease progression. He does not want any additional treatment and wants to go home on hospice. Other history includes CAD, CKD, HTN DM and SAH after a fall in September. Labs BUN 3.65, troponin 4075 on admission. PPS 50.

## 2023-12-27 NOTE — ASSESSMENT & PLAN NOTE
Patient with elevated troponin-4705  at the time of presentation.  Cardiology evaluation appreciated  Patient denies any chest pain  Monitor-trending down to 3885  C/w BB for comfort  No further w/u w/ pt on hospice

## 2023-12-27 NOTE — CASE MANAGEMENT
Case Management Assessment & Discharge Planning Note    Patient name Sidney Wheeler  Location [facility] 711/[facility] 711-01 MRN [id number]  : 1940 Date 2023       Current Admission Date: 2023  Current Admission Diagnosis:Pneumonia   Patient Active Problem List    Diagnosis Date Noted    D-dimer, elevated 2023    Transaminitis 2023    Generalized weakness 2023    Pneumonia 2023    Sebaceous cyst of axilla 11/10/2023    Skin tear of right forearm without complication 2023    Acute headache 2023    Snoring 2023    Sinus congestion 2023    Insomnia 2023    History of stroke 2023    Abnormal findings on imaging test 2023    Atrial flutter (HCC) 2023    Leukocytosis 2023    GERD (gastroesophageal reflux disease) 2023    Fracture of temporal bone ([facility]) 09/15/2023    SDH (subdural hematoma) ([facility]) 09/15/2023    Fall 09/15/2023    SAH (subarachnoid hemorrhage) ([facility]) 09/15/2023    Pneumocephalus 09/15/2023    Hyponatremia 2023    Pelvic lymphadenopathy 2023    Chronic diastolic CHF (congestive heart failure) ([facility]) 2023    Rib pain on left side 2023    Left inguinal pain 2023    Chemotherapy-induced thrombocytopenia 02/15/2023    Shortness of breath 2022    Anemia due to stage 4 chronic kidney disease  2022    Stage 4 chronic kidney disease (HCC) 08/10/2022    Secondary hyperparathyroidism of renal origin ([facility]) 2022    Urethral tumor 2022    Functional diarrhea 2022    Platelets decreased ([facility]) 2022    Visual hallucinations 2021    Chronic kidney disease-mineral and bone disorder 10/24/2021    Benign hypertension with chronic kidney disease, stage IV ([facility]) 2021    Persistent proteinuria 2021    Anemia 2021    RBBB 2021    Hypomagnesemia 2021    Metabolic acidosis 2021    Severe protein-calorie malnutrition ([facility])  02/19/2021    Right sided Pelvic abscess in male (HCC) 02/18/2021    Ambulatory dysfunction 02/16/2021    Frequent falls 02/16/2021    Anxiety and depression 12/22/2020    Overweight 12/22/2020    Fungal dermatitis 12/03/2020    Forearm mass, left 09/03/2020    Elevated troponin 07/21/2020    Liver function study, abnormal 06/25/2020    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) 03/24/2020    Positive urinary cytology 11/05/2019    Coronary artery disease involving native coronary artery of native heart without angina pectoris 09/09/2019    Ischemic cardiomyopathy 09/09/2019    History of bladder cancer 07/30/2019    Closed fracture of upper end of right fibula 03/11/2019    Malignant neoplasm of overlapping sites of bladder (HCC) 01/10/2019    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/2019    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  [address] [place] [address]  Phone: [phone] Fax: [phone]    Primary Care Provider: Sixto Falcon DO    Primary Insurance: MEDICARE  Secondary Insurance: BLUE CROSS    ASSESSMENT:  Active Health Care Proxies       Sheyla Wheeler Health Care Representative - Spouse   Primary Phone: [phone] (Mobile)                 Advance Directives  Does patient have a Health Care POA?: Yes  Does patient have Advance Directives?: Yes  Advance Directives: Power  of  for health care  Primary Contact: Sheyla Wheeler (Spouse)  [phone] (Mobile)              Patient Information  Admitted from:: Home  Mental Status: Alert  During Assessment patient was accompanied by: Spouse  Assessment information provided by:: Spouse, Patient  Primary Caregiver: Spouse  Caregiver's Name:: Sheyla Wheeler (Spouse)  [phone] (Mobile)  Caregiver's Relationship to Patient:: Significant Other  Caregiver's Telephone Number:: Sheyla Wheeler (Spouse)  [phone] (Mobile)  Support Systems: Self, Spouse/significant other, Son, Daughter, Friends/neighbors  County of Residence: [place]  What city do you live in?: [place]  Home entry access options. Select all that apply.: Stairs  Number of steps to enter home.: 2  Do the steps have railings?: Yes  Type of Current Residence: [facility]  Living Arrangements: Lives w/ Spouse/significant other  Is patient a ?: Yes  Is patient active with [facility] ( [facility])?: No    Activities of Daily Living Prior to Admission  Functional Status: Assistance  Completes ADLs independently?: No  Level of ADL dependence: Assistance  Ambulates independently?: No  Level of ambulatory dependence: Assistance  Does patient use assisted devices?: Yes  Assisted Devices (DME) used: Walker, Straight Cane, Shower Chair  Does patient currently own DME?: Yes  What DME does the patient currently own?: Shower Chair, Straight Cane, Walker  Does patient have a history of Outpatient Therapy (PT/OT)?: No  Does the patient have a history of Short-Term Rehab?: Yes ([facility] [place])  Does patient have a history of HHC?: Yes (. [facility])  Does patient currently have HHC?: No         Patient Information Continued  Does patient have prescription coverage?: Yes  Does patient receive dialysis treatments?: No  Does patient have a history of substance abuse?: No  Does patient have a history of Mental Health Diagnosis?: No         Means of  Transportation  Means of Transport to Appts:: Family transport      Housing Stability: Low Risk  (12/27/2023)    Housing Stability Vital Sign     Unable to Pay for Housing in the Last Year: No     Number of Places Lived in the Last Year: 1     Unstable Housing in the Last Year: No   Food Insecurity: No Food Insecurity (12/27/2023)    Hunger Vital Sign     Worried About Running Out of Food in the Last Year: Never true     Ran Out of Food in the Last Year: Never true   Transportation Needs: No Transportation Needs (12/27/2023)    PRAPARE - Transportation     Lack of Transportation (Medical): No     Lack of Transportation (Non-Medical): No   Utilities: Not At Risk (12/27/2023)    [facility] Utilities     Threatened with loss of utilities: No       DISCHARGE DETAILS:    Discharge planning discussed with:: Patient and spouse at the bedside  Freedom of Choice: Yes  Comments - Freedom of Choice: FOC for Hospice care provided- they prefer to work within the [employer]  CM contacted family/caregiver?: Yes  Were Treatment Team discharge recommendations reviewed with patient/caregiver?: Yes  Did patient/caregiver verbalize understanding of patient care needs?: Yes  Were patient/caregiver advised of the risks associated with not following Treatment Team discharge recommendations?: Yes    Contacts  Patient Contacts: Sheyla Dutton  Relationship to Patient:: Family  Contact Method: In Person  Reason/Outcome: Discharge Planning              Other Referral/Resources/Interventions Provided:  Interventions: Hospice    Would you like to participate in our [facility] service program?  : No - Declined    Treatment Team Recommendation: Hospice  Discharge Destination Plan:: Hospice  Transport at Discharge : Family      CM reviewed d/c planning process including the following: identifying help at home, patient preference for d/c planning needs,[facility] Med[facility] to Bed program, availability of treatment team to discuss questions or  concerns patient and/or family may have regarding understanding medications and recognizing signs and symptoms once discharged. CM also encouraged patient to follow up with all recommended appointments after discharge. Patient advised of importance for patient and family to participate in managing patient’s medical well being.    Patient and family have opted for Hospice on discharge, they prefer to stay within the [facility]. [facility] referral placed.

## 2023-12-27 NOTE — ASSESSMENT & PLAN NOTE
Patient was admitted to the hospital after a fall.  Noted to have subarachnoid hemorrhage/subdural hemorrhage.  Repeat imaging showed resolution of the subarachnoid hemorrhage.  Hold DVT prophylaxis

## 2023-12-27 NOTE — HOSPICE NOTE
Received hospice referral.  Dr Toledo approved for home hospice LOC.  I left a voicemail for pt's wife Yesenia to discuss hospice services. Hospice Liaison will follow up tomorrow. DOMINIQUE Correa updated.

## 2023-12-27 NOTE — ASSESSMENT & PLAN NOTE
Lab Results   Component Value Date    EGFR 14 12/27/2023    EGFR 15 12/26/2023    EGFR 23 11/13/2023    CREATININE 3.65 (H) 12/27/2023    CREATININE 3.52 (H) 12/26/2023    CREATININE 2.41 (H) 11/13/2023   Patient is on hydralazine and amlodipine and also metoprolol as outpatient   C/w BB for comfort

## 2023-12-27 NOTE — CONSULTS
Oncology Consult Note  Sidney Wheeler 83 y.o. male MRN: [id number]  Unit/Bed#: [facility] 711-01 Encounter: [id number]      Presenting Complaint: metastatic bladder cancer    Oncology History   Malignant neoplasm of overlapping sites of bladder (HCC)   1/10/2019 Initial Diagnosis    Malignant neoplasm of lateral wall of urinary bladder (HCC)     5/26/2020 - 6/16/2020 Chemotherapy    pembrolizumab (KEYTRUDA) IVPB, 200 mg, Intravenous, Once, 3 of 6 cycles  Administration: 200 mg (5/26/2020), 200 mg (6/16/2020)     9/30/2022 - 12/1/2022 Chemotherapy    fosaprepitant (EMEND) IVPB, 150 mg, Intravenous, Once, 4 of 6 cycles  Administration: 150 mg (9/30/2022), 150 mg (10/21/2022), 150 mg (11/10/2022), 150 mg (12/1/2022)  CARBOplatin (PARAPLATIN) IVPB (GOG AUC DOSING), 215.2 mg, Intravenous, Once, 4 of 6 cycles  Administration: 215.2 mg (9/30/2022), 209.2 mg (10/21/2022), 197.6 mg (11/10/2022), 183.6 mg (12/1/2022)  gemcitabine (GEMZAR) infusion, 1,970 mg, Intravenous, Once, 4 of 6 cycles  Dose modification: 800 mg/m2 (original dose 1,000 mg/m2, Cycle 4, Reason: Dose modified as per discussion with consulting physician, Comment: hgb 8.2 )  Administration: 2,000 mg (9/30/2022), 2,000 mg (10/7/2022), 2,000 mg (10/21/2022), 2,000 mg (10/27/2022), 2,000 mg (11/10/2022), 2,000 mg (11/17/2022), 1,576 mg (12/1/2022)     10/6/2022 -  Cancer Staged    Staging form: Urinary Bladder, AJCC 8th Edition  - Clinical stage from 10/6/2022: rcT3, cN3 - Signed by Trino Peters MD on 10/27/2022  Stage prefix: Recurrence  WHO/ISUP grade (low/high): High Grade  Histologic grading system: 2 grade system       1/19/2023 - 3/9/2023 Chemotherapy    fosaprepitant (EMEND) IVPB, 150 mg, Intravenous, Once, 2 of 3 cycles  Administration: 150 mg (1/19/2023), 150 mg (2/1/2023), 150 mg (2/23/2023), 150 mg (3/9/2023)  CARBOplatin (PARAPLATIN) IVPB (GO AUC DOSING), 196 mg, Intravenous, Once, 2 of 3 cycles  Administration: 196 mg (1/19/2023), 86.8 mg  (2/23/2023), 97 mg (3/9/2023)  gemcitabine (GEMZAR) infusion, 800 mg/m2 = 1,600 mg (80 % of original dose 1,000 mg/m2), Intravenous, Once, 2 of 3 cycles  Dose modification: 800 mg/m2 (original dose 1,000 mg/m2, Cycle 1, Reason: Anticipated Tolerance)  Administration: 1,600 mg (1/19/2023), 1,600 mg (2/1/2023), 1,600 mg (2/23/2023), 1,600 mg (3/9/2023)     5/25/2023 - 9/7/2023 Chemotherapy    alteplase (CATHFLO), 2 mg, Intracatheter, Every 1 Minute as needed, 8 of 10 cycles  avelumab (BAVENCIO) IVPB, 800 mg, Intravenous, Once, 8 of 10 cycles  Administration: 800 mg (5/25/2023), 800 mg (6/8/2023), 800 mg (6/22/2023), 800 mg (7/6/2023), 800 mg (7/20/2023), 800 mg (8/3/2023), 800 mg (8/24/2023), 800 mg (9/7/2023)     10/5/2023 -  Cancer Staged    Staging form: Urinary Bladder, AJCC 8th Edition  - Pathologic stage from 10/5/2023: pT3b, pN3, cM1 - Signed by Trino Peters MD on 10/5/2023  Histologic grade (G): G3  Histologic grading system: 3 grade system       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)   3/24/2020 Initial Diagnosis    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)     5/25/2023 - 9/7/2023 Chemotherapy    alteplase (CATHFLO), 2 mg, Intracatheter, Every 1 Minute as needed, 8 of 10 cycles  avelumab (BAVENCIO) IVPB, 800 mg, Intravenous, Once, 8 of 10 cycles  Administration: 800 mg (5/25/2023), 800 mg (6/8/2023), 800 mg (6/22/2023), 800 mg (7/6/2023), 800 mg (7/20/2023), 800 mg (8/3/2023), 800 mg (8/24/2023), 800 mg (9/7/2023)         History of Presenting Illness: 84 yo male present with a past medical history of metastatic bladder cancer, CAD, CKD stage IV, hypertension, type 2 diabetes presents with generalized weakness.  When he tried to get out of bed yesterday morning he was too weak to stand on his own.  He has also been having falls and suffered an intracranial hemorrhage after a fall in September.  He is here today with his wife and daughter at the bedside.  He is known to Dr. Peters.  Prior  treatments include pembrolizumab when disease was confined to the bladder.  More recently he has had progression to metastatic disease and has been treated with carboplatin and gemcitabine a year ago followed by atezolizumab.  He had been talking to Dr. Peters about considering enfortumab vedotin versus more palliative approach.  He also has complaints of a cough that is so severe that he has posttussive emesis at times.  It is nonproductive.  He denies any overt shortness of breath.  He has pain in his that his thighs especially when he tries to stand.  He has some mild constipation last bowel movement was about 2 days ago.  He denies headaches.  He had a CT scan that shows progressive disease with new liver mets masses.  He has a transaminitis but bilirubin is normal.  He also has several new lytic osseous lesions and enlarging abdominal lymphadenopathy.  He has an acute pathologic fracture at the L1 vertebral body.  He also has a moderate size right pleural effusion.    Review of Systems - As stated in the HPI otherwise the fourteen point review of systems was negative.    ECOG PS:3    Past Medical History:   Diagnosis Date    Acute kidney injury (HCC)     Anemia Jan. 2022    Arthritis     Hands    Wright esophagus     BPH with obstruction/lower urinary tract symptoms     CAD (coronary artery disease)     Last assessed 09/16/15    Cancer (HCC)     bladder    Cataract, acquired     Last assessed 10/10/17    Chronic kidney disease     Coronary artery disease     Diabetes mellitus (HCC)     NIDDM    Diabetic neuropathy (HCC)     Feet    Enlarged prostate with lower urinary tract symptoms (LUTS)     Last assessed 10/10/17    Erectile dysfunction     GERD (gastroesophageal reflux disease)     Last assessed 10/10/17    Hemoptysis     Last assessed 03/14/16    Hypercholesterolemia     Last assessed 10/10/17    Hypertension     Last assessed 10/10/17    Kidney stone     Macular degeneration     Right eye is particularly  affected-peripheral vision intact    Myocardial infarction (HCC)     OAB (overactive bladder)     Testicular hypofunction     Testicular hypogonadism     Last assessed 10/10/17    Tinnitus     Umbilical hernia     Last assessed 14    Urge incontinence of urine     Wears glasses        Social History     Socioeconomic History    Marital status: /Civil Union     Spouse name: None    Number of children: None    Years of education: None    Highest education level: None   Occupational History    Occupation: Sales position   Tobacco Use    Smoking status: Former     Current packs/day: 0.00     Average packs/day: 1 pack/day for 10.0 years (10.0 ttl pk-yrs)     Types: Cigarettes     Start date: 1962     Quit date: 1972     Years since quittin.0    Smokeless tobacco: Never    Tobacco comments:     Quit at age 32   Vaping Use    Vaping status: Never Used   Substance and Sexual Activity    Alcohol use: Not Currently     Alcohol/week: 2.0 standard drinks of alcohol     Types: 1 Glasses of wine, 1 Cans of beer per week     Comment: Non since 7/15/2020    Drug use: Never    Sexual activity: Not Currently     Partners: Female   Other Topics Concern    None   Social History Narrative    Always uses seatbelt        Caffeine use- Drinks 2 cups of coffee daily     Social Determinants of Health     Financial Resource Strain: Low Risk  (2022)    Overall Financial Resource Strain (CARDIA)     Difficulty of Paying Living Expenses: Not very hard   Food Insecurity: No Food Insecurity (2023)    Hunger Vital Sign     Worried About Running Out of Food in the Last Year: Never true     Ran Out of Food in the Last Year: Never true   Transportation Needs: No Transportation Needs (2023)    PRAPARE - Transportation     Lack of Transportation (Medical): No     Lack of Transportation (Non-Medical): No   Physical Activity: Not on file   Stress: Not on file   Social Connections: Not on file   Intimate  Partner Violence: Not on file   Housing Stability: Low Risk  (9/17/2023)    Housing Stability Vital Sign     Unable to Pay for Housing in the Last Year: No     Number of Places Lived in the Last Year: 1     Unstable Housing in the Last Year: No       Family History   Problem Relation Age of Onset    Cancer Mother         Topical oral abrasian caused cancer    Other Mother         Digestive System Complications    Diabetes Father     Heart disease Father     Hypertension Father     Coronary artery disease Father     Hyperlipidemia Father        Allergies   Allergen Reactions    Fentanyl Hallucinations    Morphine And Related Other (See Comments)     While having an MI patient received morphine and had adverse reaction but doesn't know what happened.         Current Facility-Administered Medications:     acetaminophen (TYLENOL) tablet 650 mg, 650 mg, Oral, Q4H PRN, Heu Hanna MD    bisacodyl (DULCOLAX) rectal suppository 10 mg, 10 mg, Rectal, Once, Adal De La O DO    citalopram (CeleXA) tablet 20 mg, 20 mg, Oral, Daily, Hue Hanna MD, 20 mg at 12/27/23 0953    docusate sodium (COLACE) capsule 100 mg, 100 mg, Oral, BID, Hue Hanna MD, 100 mg at 12/27/23 0953    fluticasone (FLONASE) 50 mcg/act nasal spray 1 spray, 1 spray, Nasal, Daily, Hue Hanna MD, 1 spray at 12/27/23 0955    loratadine (CLARITIN) tablet 10 mg, 10 mg, Oral, Daily PRN, Hue Hanna MD    LORazepam (ATIVAN) tablet 0.5 mg, 0.5 mg, Oral, Q8H PRN, Adal De La O DO    melatonin tablet 6 mg, 6 mg, Oral, HS, Hue Hanna MD, 6 mg at 12/26/23 2140    metoprolol tartrate (LOPRESSOR) tablet 25 mg, 25 mg, Oral, Q12H MARIA LUZ, Hue Hanna MD, 25 mg at 12/27/23 1003    ondansetron (ZOFRAN) injection 4 mg, 4 mg, Intravenous, Q6H PRN, Hue Hanna MD    oxyCODONE (ROXICODONE) IR tablet 5 mg, 5 mg, Oral, Q2H PRN, Adal De La O DO    pantoprazole (PROTONIX) EC tablet 40 mg, 40 mg, Oral, Early Morning, Hue Hanna MD, 40 mg at 12/27/23  0502    polyethylene glycol (MIRALAX) packet 17 g, 17 g, Oral, Daily, Hue aHnna MD, 17 g at 12/27/23 1003    senna (SENOKOT) tablet 8.6 mg, 1 tablet, Oral, Daily, Hue Hanna MD, 8.6 mg at 12/27/23 1003    simethicone (MYLICON) chewable tablet 80 mg, 80 mg, Oral, 4x Daily PRN, Hue Hanna MD    sodium bicarbonate tablet 1,300 mg, 1,300 mg, Oral, BID, Hue Hanna MD, 1,300 mg at 12/27/23 0954    traZODone (DESYREL) tablet 25 mg, 25 mg, Oral, HS PRN, Hue Hanna MD      /86   Pulse 76   Temp 97.8 °F (36.6 °C)   Resp 18   Wt 82.3 kg (181 lb 7 oz)   SpO2 93%   BMI 28.42 kg/m²     General Appearance:    Alert, oriented        Eyes:    PERRL   Ears:    Normal external ear canals, both ears   Nose:   Nares normal, septum midline   Throat:   Mucosa moist. Pharynx without injection.    Neck:   Supple       Lungs:   Decreased breath sounds left base   Chest Wall:    No tenderness or deformity    Heart:    Regular rate and rhythm       Abdomen:     Soft, non-tender, bowel sounds +, no organomegaly           Extremities:   Extremities no cyanosis or edema       Skin:   no rash or icterus.    Lymph nodes:   Cervical, supraclavicular, and axillary nodes normal   Neurologic:   CNII-XII intact, normal strength, sensation and reflexes     Throughout               Recent Results (from the past 48 hour(s))   ECG 12 lead    Collection Time: 12/26/23 10:20 AM   Result Value Ref Range    Ventricular Rate 87 BPM    Atrial Rate 150 BPM    MN Interval  ms    QRSD Interval 144 ms    QT Interval 396 ms    QTC Interval 476 ms    P Axis  degrees    QRS Axis 269 degrees    T Wave Axis 99 degrees   CBC and differential    Collection Time: 12/26/23 11:00 AM   Result Value Ref Range    WBC 17.94 (H) 4.31 - 10.16 Thousand/uL    RBC 4.11 3.88 - 5.62 Million/uL    Hemoglobin 11.8 (L) 12.0 - 17.0 g/dL    Hematocrit 38.9 36.5 - 49.3 %    MCV 95 82 - 98 fL    MCH 28.7 26.8 - 34.3 pg    MCHC 30.3 (L) 31.4 - 37.4 g/dL    RDW  "19.4 (H) 11.6 - 15.1 %    MPV 11.8 8.9 - 12.7 fL    Platelets 309 149 - 390 Thousands/uL    nRBC 0 /100 WBCs    Neutrophils Relative 88 (H) 43 - 75 %    Immat GRANS % 1 0 - 2 %    Lymphocytes Relative 3 (L) 14 - 44 %    Monocytes Relative 8 4 - 12 %    Eosinophils Relative 0 0 - 6 %    Basophils Relative 0 0 - 1 %    Neutrophils Absolute 15.71 (H) 1.85 - 7.62 Thousands/µL    Immature Grans Absolute 0.26 (H) 0.00 - 0.20 Thousand/uL    Lymphocytes Absolute 0.57 (L) 0.60 - 4.47 Thousands/µL    Monocytes Absolute 1.34 (H) 0.17 - 1.22 Thousand/µL    Eosinophils Absolute 0.02 0.00 - 0.61 Thousand/µL    Basophils Absolute 0.04 0.00 - 0.10 Thousands/µL   Comprehensive metabolic panel    Collection Time: 12/26/23 11:00 AM   Result Value Ref Range    Sodium 136 135 - 147 mmol/L    Potassium 4.8 3.5 - 5.3 mmol/L    Chloride 101 96 - 108 mmol/L    CO2 18 (L) 21 - 32 mmol/L    ANION GAP 17 mmol/L    BUN 57 (H) 5 - 25 mg/dL    Creatinine 3.52 (H) 0.60 - 1.30 mg/dL    Glucose 177 (H) 65 - 140 mg/dL    Calcium 8.7 8.4 - 10.2 mg/dL    Corrected Calcium 9.3 8.3 - 10.1 mg/dL     (H) 13 - 39 U/L    ALT 95 (H) 7 - 52 U/L    Alkaline Phosphatase 176 (H) 34 - 104 U/L    Total Protein 6.8 6.4 - 8.4 g/dL    Albumin 3.3 (L) 3.5 - 5.0 g/dL    Total Bilirubin 0.83 0.20 - 1.00 mg/dL    eGFR 15 ml/min/1.73sq m   HS Troponin 0hr (reflex protocol)    Collection Time: 12/26/23 11:00 AM   Result Value Ref Range    hs TnI 0hr 4,705 (H) \"Refer to ACS Flowchart\"- see link ng/L   CK    Collection Time: 12/26/23 11:00 AM   Result Value Ref Range    Total  39 - 308 U/L   CKMB    Collection Time: 12/26/23 11:00 AM   Result Value Ref Range    CK-MB 21.9 (H) 0.6 - 6.3 ng/mL   Lactic acid    Collection Time: 12/26/23 12:37 PM   Result Value Ref Range    LACTIC ACID 1.9 0.5 - 2.0 mmol/L   Procalcitonin    Collection Time: 12/26/23 12:37 PM   Result Value Ref Range    Procalcitonin 0.69 (H) <=0.25 ng/ml   Protime-INR    Collection Time: 12/26/23 " "12:37 PM   Result Value Ref Range    Protime 16.0 (H) 11.6 - 14.5 seconds    INR 1.29 (H) 0.84 - 1.19   APTT    Collection Time: 12/26/23 12:37 PM   Result Value Ref Range    PTT 27 23 - 37 seconds   Blood culture #2    Collection Time: 12/26/23 12:37 PM    Specimen: Line, Venous; Blood   Result Value Ref Range    Blood Culture Received in Microbiology Lab. Culture in Progress.    HS Troponin I 2hr    Collection Time: 12/26/23 12:37 PM   Result Value Ref Range    hs TnI 2hr 3,885 (H) \"Refer to ACS Flowchart\"- see link ng/L    Delta 2hr hsTnI -820 <20 ng/L   D-dimer, quantitative    Collection Time: 12/26/23 12:37 PM   Result Value Ref Range    D-Dimer, Quant 7.41 (H) <0.50 ug/ml FEU   Blood culture #1    Collection Time: 12/26/23 12:42 PM    Specimen: Hand, Right; Blood   Result Value Ref Range    Blood Culture Received in Microbiology Lab. Culture in Progress.    HS Troponin I 4hr    Collection Time: 12/26/23  3:18 PM   Result Value Ref Range    hs TnI 4hr 3,862 (H) \"Refer to ACS Flowchart\"- see link ng/L    Delta 4hr hsTnI -843 <20 ng/L   Fingerstick Glucose (POCT)    Collection Time: 12/26/23  4:30 PM   Result Value Ref Range    POC Glucose 160 (H) 65 - 140 mg/dl   Fingerstick Glucose (POCT)    Collection Time: 12/26/23  9:32 PM   Result Value Ref Range    POC Glucose 154 (H) 65 - 140 mg/dl   Comprehensive metabolic panel    Collection Time: 12/27/23  4:56 AM   Result Value Ref Range    Sodium 134 (L) 135 - 147 mmol/L    Potassium 5.1 3.5 - 5.3 mmol/L    Chloride 99 96 - 108 mmol/L    CO2 17 (L) 21 - 32 mmol/L    ANION GAP 18 mmol/L    BUN 63 (H) 5 - 25 mg/dL    Creatinine 3.65 (H) 0.60 - 1.30 mg/dL    Glucose 174 (H) 65 - 140 mg/dL    Calcium 8.8 8.4 - 10.2 mg/dL    Corrected Calcium 9.5 8.3 - 10.1 mg/dL     (H) 13 - 39 U/L     (H) 7 - 52 U/L    Alkaline Phosphatase 157 (H) 34 - 104 U/L    Total Protein 6.5 6.4 - 8.4 g/dL    Albumin 3.1 (L) 3.5 - 5.0 g/dL    Total Bilirubin 0.81 0.20 - 1.00 mg/dL "    eGFR 14 ml/min/1.73sq m   CBC (With Platelets)    Collection Time: 12/27/23  4:56 AM   Result Value Ref Range    WBC 18.95 (H) 4.31 - 10.16 Thousand/uL    RBC 4.05 3.88 - 5.62 Million/uL    Hemoglobin 11.4 (L) 12.0 - 17.0 g/dL    Hematocrit 37.8 36.5 - 49.3 %    MCV 93 82 - 98 fL    MCH 28.1 26.8 - 34.3 pg    MCHC 30.2 (L) 31.4 - 37.4 g/dL    RDW 19.6 (H) 11.6 - 15.1 %    Platelets 277 149 - 390 Thousands/uL    MPV 12.2 8.9 - 12.7 fL   Procalcitonin    Collection Time: 12/27/23  4:56 AM   Result Value Ref Range    Procalcitonin 0.78 (H) <=0.25 ng/ml   Fingerstick Glucose (POCT)    Collection Time: 12/27/23  6:14 AM   Result Value Ref Range    POC Glucose 172 (H) 65 - 140 mg/dl         CT chest abdomen pelvis wo contrast    Result Date: 12/26/2023  Narrative: CT CHEST, ABDOMEN AND PELVIS WITHOUT IV CONTRAST INDICATION:   r/o pneumonia, signs of PE. COMPARISON: CT chest/abdomen/pelvis dated 9/15/2023. TECHNIQUE: CT examination of the chest, abdomen and pelvis was performed without intravenous contrast. Multiplanar 2D reformatted images were created from the source data. This examination, like all CT scans performed in the [facility], was performed utilizing techniques to minimize radiation dose exposure, including the use of iterative reconstruction and automated exposure control. Radiation dose length product (DLP) for this visit:  987.26 mGy-cm Enteric contrast was not administered. FINDINGS: CHEST LUNGS: No focal airspace consolidation. There is no tracheal or endobronchial lesion. PLEURA: Moderate sized right pleural effusion with subjacent compressive atelectasis. Small left pleural effusion. HEART/GREAT VESSELS: Heart is unremarkable for patient's age.  No thoracic aortic aneurysm. MEDIASTINUM AND BISHOP:  Unremarkable. CHEST WALL AND LOWER NECK:  Unremarkable. ABDOMEN LIVER/BILIARY TREE: Although evaluation is limited without intravenous contrast, there are multiple hypoattenuating masses  scattered throughout the liver, most of which are either new or increased in size. For example, the largest mass is in the posterior right hepatic lobe measuring 2.9 x 2.5 cm (image 112, series 2), new from the previous examination. GALLBLADDER:  No calcified gallstones. No pericholecystic inflammatory change. SPLEEN:  Unremarkable. PANCREAS:  Unremarkable. ADRENAL GLANDS:  Unremarkable. KIDNEYS/URETERS:  Unremarkable. No hydronephrosis. STOMACH AND BOWEL: No evidence for bowel obstruction. APPENDIX:  No findings to suggest appendicitis. ABDOMINOPELVIC CAVITY: Small abdominal pelvic ascites. No pneumoperitoneum. A small fluid density structure adjacent to a surgical clip along the right pelvic sidewall is mildly increased in size now measuring up to 2 cm (image 211, series 2), previously  1.7 cm. Again demonstrated are 2 enlarged adjacent left mesenteric lymph nodes, the larger of which now measures 2.6 x 2.2 cm (image 174, series 2), previously 2.4 x 1.9 cm. VESSELS: Atherosclerotic disease. No abdominal aortic aneurysm. PELVIS REPRODUCTIVE ORGANS/URINARY BLADDER: The patient is again noted to be status post cystoprostatectomy with right lower quadrant ileal conduit. A soft tissue nodule in the left posterior perirectal space is mildly enlarged now measuring 1.7 x 1.4 cm (image  221, series 2), previously 1.5 x 1.2 cm. ABDOMINAL WALL/INGUINAL REGIONS:  Unremarkable. OSSEOUS STRUCTURES:  There are a few new osseous lesions suspicious for metastases as follows: 1.3 x 1.0 cm lytic lesion with mild destruction of the lateral cortex within the left iliac bone (image 180, series 2) susp 1.3 cm lytic lesion along the inferior endplate of the L1 vertebral body with associated acute pathologic fracture (image 104, series 602) Mild cortical destruction of the left lateral sixth rib (image 114, series 3)     Impression: Interval progression of disease as evidenced by multiple new/enlarging liver masses, several new lytic  osseous lesions, enlarging left-sided mesenteric lymph nodes, and enlarging left posterior perirectal soft tissue nodule. Acute pathologic fracture involving the L1 vertebral body. Small abdominopelvic ascites. No CT evidence for pneumonia. Moderate sized right pleural effusion with subjacent compressive atelectasis. Small left pleural effusion. The study was marked in EPIC for significant notification. Workstation performed: [id number]     XR chest 2 views    Result Date: 12/26/2023  Narrative: CHEST INDICATION:   pna. COMPARISON: 9/15/2023 EXAM PERFORMED/VIEWS:  XR CHEST PA & LATERAL FINDINGS: Cardiomediastinal silhouette stable. Median sternotomy wires are present. The aorta is atherosclerotic. There is pleural effusion bilaterally right greater than left and there appears to be some consolidation in the right lower lobe. No pneumothorax. Osseous structures appear within normal limits for patient age.     Impression: Bilateral right greater than left pleural effusions and right lower lobe consolidation concerning for a possible pneumonia. The study was marked in EPIC for immediate notification. Workstation performed: [id number]       Assessment and Plan: 83-year-old male with progressive metastatic bladder carcinoma.  I reviewed the CT scan findings that show progressive disease.  Dr. Peters did start having palliative based talks with the patient already and he has discussed with his wife how much more aggressive we will want to be with his cancer.  He does not want any more systemic treatment for his cancer.  This is very appropriate.  As far as other symptom type management, he is not excessively short of breath due to the effusion so I do not think it necessarily needs to be drained.  He is not having a lot of low back pain at this time.  If that would become an issue consideration of kyphoplasty could be made.  Overall the patient states that he is tired and his preference would be to go home on home  hospice.  If his pain can be controlled with pain medication and his other symptoms controlled with palliative medication, I think that is the least stressful way to go for him.  I recommend a referral to case management/social work for hospice arrangements.  I will take care of canceling further appointments and treatments in our office.  The patient was very clear about what he wanted and his wife and daughter at the bedside were in agreement with his wishes.  Please feel free to call going forward with any questions or concerns.        I spent 30 minutes on chart review, direct face to face counseling, coordination of care and documentation.

## 2023-12-27 NOTE — ASSESSMENT & PLAN NOTE
Lab Results   Component Value Date    EGFR 14 12/27/2023    EGFR 15 12/26/2023    EGFR 23 11/13/2023    CREATININE 3.65 (H) 12/27/2023    CREATININE 3.52 (H) 12/26/2023    CREATININE 2.41 (H) 11/13/2023   Estimated Creatinine Clearance: 15.7 mL/min (A) (by C-G formula based on SCr of 3.65 mg/dL (H)).  Patient with CKD stage IV.  Baseline creatinine appears to be   2-2.8 per previous nephrology progress note  Creatinine on admission is 3.52.  Patient with generalized p.o. intake and decreased p.o. intake.  No improvement w/ IVF  Hospice

## 2023-12-27 NOTE — OCCUPATIONAL THERAPY NOTE
Occupational Therapy Cancel        Patient Name: Sidney Wheeler  Today's Date: 12/27/2023 12/27/23 1400   OT Last Visit   OT Visit Date 12/27/23   Note Type   Note type Evaluation;Cancelled Session   Cancel Reasons Medical status   OT consulted, chart reviewed. Pt transitioned to level 4/hospice care. No OT needs/will discharge from caseload. Angie WISEMAN, OTR/L

## 2023-12-27 NOTE — PROGRESS NOTES
[facility]  Progress Note  Name: Sidney Wheeler I  MRN: [id number]  Unit/Bed#: [facility] 711-01 I Date of Admission: 12/26/2023   Date of Service: 12/27/2023 I Hospital Day: 1    Assessment/Plan   * Acute respiratory failure with hypoxia (HCC)  Assessment & Plan  *7 on RA  Patient presented to the hospital with generalized weakness which has been going on for weeks.  Cough and expectoration and also posttussive emesis present.  No fever at home  Leukocytosis noted.  Mildly elevated procalcitonin present.  CT chest c/w pleural effusions  O2 for comfort    Closed fracture of first lumbar vertebra ([facility])  Assessment & Plan  Denies pain  Oxycodone prn    Transaminitis  Assessment & Plan  Patient noted to have new onset transaminitis.  No abdominal pain on examination.  CT abdomen and pelvis w/ liver mets    DVT (deep venous thrombosis) ([facility])  Assessment & Plan  Patient with elevated D-dimer.  Patient also with a worsening malignancy/CKD.    Denies any shortness of breath  Given creatinine of 3.5 will hold of ordering CTA.  LEVD w/ LLE distal DVT - no ACE due to prior SAH and pt going on hospice    Atrial flutter ([facility])  Assessment & Plan  Patient with history of a flutter.  Currently in sinus rhythm.  Not on anticoagulation due to fall risk  BB for comfort    SAH (subarachnoid hemorrhage) ([facility])  Assessment & Plan  Patient was admitted to the hospital after a fall.  Noted to have subarachnoid hemorrhage/subdural hemorrhage.  Repeat imaging showed resolution of the subarachnoid hemorrhage.  Hold DVT prophylaxis     Acute kidney injury superimposed on chronic kidney disease   Assessment & Plan  Lab Results   Component Value Date    EGFR 14 12/27/2023    EGFR 15 12/26/2023    EGFR 23 11/13/2023    CREATININE 3.65 (H) 12/27/2023    CREATININE 3.52 (H) 12/26/2023    CREATININE 2.41 (H) 11/13/2023   Estimated Creatinine Clearance: 15.7 mL/min (A) (by C-G formula based on SCr of 3.65 mg/dL  (H)).  Patient with CKD stage IV.  Baseline creatinine appears to be   2-2.8 per previous nephrology progress note  Creatinine on admission is 3.52.  Patient with generalized p.o. intake and decreased p.o. intake.  No improvement w/ IVF  Hospice        Benign hypertension with chronic kidney disease, stage IV (HCC)  Assessment & Plan  Lab Results   Component Value Date    EGFR 14 12/27/2023    EGFR 15 12/26/2023    EGFR 23 11/13/2023    CREATININE 3.65 (H) 12/27/2023    CREATININE 3.52 (H) 12/26/2023    CREATININE 2.41 (H) 11/13/2023   Patient is on hydralazine and amlodipine and also metoprolol as outpatient   C/w BB for comfort    Elevated troponin  Assessment & Plan  Patient with elevated troponin-4705  at the time of presentation.  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 CT scan on December 2022 showed no evidence of disease in the abdomen or pelvis   Repeat imaging done recently showed a worsening of the disease process.  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  CT CAP worsening of malignancy - HemOnc appreciated.  Pt opted for comfort care/hospice    Hx of CABG  Assessment & Plan  With known history of coronary artery disease status post CABG.  Patient is on statin and beta-blocker.  Likely aspirin discontinued due to recent fall and subdural/subarachnoid hemorrhage.  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  Hold oral hypoglycemic agent.    Stop ISS w/ pt hon hospice               VTE Pharmacologic Prophylaxis:    hospice    Mobility:   Basic Mobility Inpatient Raw Score: 17  JH-HLM Goal: 5: Stand one or more mins  JH-HLM Achieved: 5: Stand (1 or more minutes)  HLM Goal achieved. Continue to encourage appropriate mobility.    Patient Centered Rounds: I performed bedside rounds with nursing staff today.   Discussions with Specialists or Other Care Team Provider: Annette.  I also TT PCP per pt request    Education and Discussions with Family / Patient: Updated  (wife and daughter) at bedside.      Current Length of Stay: 1 day(s)  Current Patient Status: Inpatient   Certification Statement: The patient will continue to require additional inpatient hospital stay due to need for hospice to be arranged  Discharge Plan: Anticipate discharge in 24-48 hrs to home w/ hospice    Code Status: Level 4 - Comfort Care    Subjective:   PT denies pain    Objective:     Vitals:   Temp (24hrs), Av.6 °F (36.4 °C), Min:97.3 °F (36.3 °C), Max:97.8 °F (36.6 °C)    Temp:  [97.3 °F (36.3 °C)-97.8 °F (36.6 °C)] 97.8 °F (36.6 °C)  HR:  [76-91] 76  Resp:  [18] 18  BP: (143-161)/() 153/86  SpO2:  [87 %-96 %] 93 %  Body mass index is 28.42 kg/m².     Input and Output Summary (last 24 hours):     Intake/Output Summary (Last 24 hours) at 2023 1456  Last data filed at 2023 0501  Gross per 24 hour   Intake 100 ml   Output 700 ml   Net -600 ml       Physical Exam:   Physical Exam  HENT:      Head:  Normocephalic and atraumatic.      Nose: Nose normal.      Mouth/Throat:      Mouth: Mucous membranes are moist.   Eyes:      Extraocular Movements: Extraocular movements intact.      Conjunctiva/sclera: Conjunctivae normal.   Cardiovascular:      Rate and Rhythm: Normal rate and regular rhythm.   Pulmonary:      Effort: Pulmonary effort is normal.      Breath sounds: Normal breath sounds.   Abdominal:      General: Bowel sounds are normal.      Palpations: Abdomen is soft.   Musculoskeletal:         General: Normal range of motion.      Cervical back: Normal range of motion and neck supple.      Right lower leg: No edema.      Left lower leg: No edema.   Skin:     General: Skin is warm and dry.   Neurological:      Mental Status: He is alert and oriented to person, place, and time.          Additional Data:     Labs:  Results from last 7 days   Lab Units 12/27/23  0456 12/26/23  1100   WBC Thousand/uL 18.95* 17.94*   HEMOGLOBIN g/dL 11.4* 11.8*   HEMATOCRIT % 37.8 38.9   PLATELETS Thousands/uL 277 309   NEUTROS PCT %  --  88*   LYMPHS PCT %  --  3*   MONOS PCT %  --  8   EOS PCT %  --  0     Results from last 7 days   Lab Units 12/27/23  0456   SODIUM mmol/L 134*   POTASSIUM mmol/L 5.1   CHLORIDE mmol/L 99   CO2 mmol/L 17*   BUN mg/dL 63*   CREATININE mg/dL 3.65*   ANION GAP mmol/L 18   CALCIUM mg/dL 8.8   ALBUMIN g/dL 3.1*   TOTAL BILIRUBIN mg/dL 0.81   ALK PHOS U/L 157*   ALT U/L 111*   AST U/L 151*   GLUCOSE RANDOM mg/dL 174*     Results from last 7 days   Lab Units 12/26/23  1237   INR  1.29*     Results from last 7 days   Lab Units 12/27/23  1128 12/27/23  0614 12/26/23  2132 12/26/23  1630   POC GLUCOSE mg/dl 170* 172* 154* 160*         Results from last 7 days   Lab Units 12/27/23  0456 12/26/23  1237   LACTIC ACID mmol/L  --  1.9   PROCALCITONIN ng/ml 0.78* 0.69*       Lines/Drains:  Invasive Devices       Peripheral Intravenous Line  Duration             Peripheral IV 12/26/23 Left;Ventral (anterior)  Forearm 1 day              Drain  Duration             Urostomy Ureterostomy right  days                          Imaging: Reviewed radiology reports from this admission including: chest CT scan and abdominal/pelvic CT    Recent Cultures (last 7 days):   Results from last 7 days   Lab Units 12/26/23  1242 12/26/23  1237   BLOOD CULTURE  Received in Microbiology Lab. Culture in Progress. Received in Microbiology Lab. Culture in Progress.       Last 24 Hours Medication List:   Current Facility-Administered Medications   Medication Dose Route Frequency Provider Last Rate    acetaminophen  650 mg Oral Q4H PRN Hue Hanna MD      bisacodyl  10 mg Rectal Once Adal De La O DO      citalopram  20 mg Oral Daily Hue Hanna MD      docusate sodium  100 mg Oral BID Hue Hanna MD      fluticasone  1 spray Nasal Daily Hue Hanna MD      loratadine  10 mg Oral Daily PRN Hue Hanna MD      LORazepam  0.5 mg Oral Q8H PRN Adal De La O DO      melatonin  6 mg Oral HS Hue Hanna MD      metoprolol tartrate  25 mg Oral Q12H MARIA LUZ Hue Hanna MD      ondansetron  4 mg Intravenous Q6H PRN Hue Hanna MD      oxyCODONE  5 mg Oral Q2H PRN Adal De La O DO      pantoprazole  40 mg Oral Early Morning Hue Hanna MD      polyethylene glycol  17 g Oral Daily Hue Hanna MD      senna  1 tablet Oral Daily Hue Hanna MD      simethicone  80 mg Oral 4x Daily PRN Hue Hanna MD      sodium bicarbonate  1,300 mg Oral BID Hue Hanna MD      traZODone  25 mg Oral HS PRN Hue Hanna MD          Today, Patient Was Seen By: Adal De La O DO    **Please Note: This note may have been constructed using a voice recognition system.**

## 2023-12-28 NOTE — CASE MANAGEMENT
Case Management Progress Note    Patient name Sidney Wheeler  Location [facility] 711/[facility] 711- MRN [id number]  : 1940 Date 2023       LOS (days): 2  Geometric Mean LOS (GMLOS) (days): 4  Days to GMLOS:2        OBJECTIVE:        Current admission status: Inpatient  Preferred Pharmacy:   [id number] - [place] - [address]   [place]  [place] [address]  Phone: [phone] Fax: [phone]    Primary Care Provider: Sixto Falcon DO    Primary Insurance: MEDICARE  Secondary Insurance: BLUE CROSS    PROGRESS NOTE: Patient accepted by [facility] agency. He will discharge 2023 via [facility] who have claimed the ride for Sidney Wheeler in unit/room [facility] 711 bed [facility] 71, and will arrive on 2023 at 12:30pm EST. Contact them at [phone]. CM notified patients spouse and provider team. Patients equipment will be delivered in home prior to home discharge on Saturday, this information was verified by Natasha Post with [facility].

## 2023-12-28 NOTE — PLAN OF CARE
Problem: Potential for Falls  Goal: Patient will remain free of falls  Description: INTERVENTIONS:  - Educate patient/family on patient safety including physical limitations  - Instruct patient to call for assistance with activity   - Consult OT/PT to assist with strengthening/mobility   - Keep Call bell within reach  - Keep bed low and locked with side rails adjusted as appropriate  - Keep care items and personal belongings within reach  - Initiate and maintain comfort rounds  - Make Fall Risk Sign visible to staff  - Offer Toileting every 2 Hours, in advance of need  - Initiate/Maintain bed/chair alarm  - Obtain necessary fall risk management equipment.  - Apply yellow socks and bracelet for high fall risk patients  - Consider moving patient to room near nurses station  Outcome: Progressing     Problem: Prexisting or High Potential for Compromised Skin Integrity  Goal: Skin integrity is maintained or improved  Description: INTERVENTIONS:  - Identify patients at risk for skin breakdown  - Assess and monitor skin integrity  - Assess and monitor nutrition and hydration status  - Monitor labs   - Assess for incontinence   - Turn and reposition patient  - Assist with mobility/ambulation  - Relieve pressure over bony prominences  - Avoid friction and shearing  - Provide appropriate hygiene as needed including keeping skin clean and dry  - Evaluate need for skin moisturizer/barrier cream  - Collaborate with interdisciplinary team   - Patient/family teaching  - Consider wound care consult   Outcome: Progressing     Problem: PAIN - ADULT  Goal: Verbalizes/displays adequate comfort level or baseline comfort level  Description: Interventions:  - Encourage patient to monitor pain and request assistance  - Assess pain using appropriate pain scale  - Administer analgesics based on type and severity of pain and evaluate response  - Implement non-pharmacological measures as appropriate and evaluate response  - Notify  physician/advanced practitioner if interventions unsuccessful or patient reports new pain  Outcome: Progressing

## 2023-12-28 NOTE — PROGRESS NOTES
Patient:  SEAN GALAN    MRN:  [id number]    Aidin Request ID:  [id number]    Level of care reserved:  Hospice    Partner Reserved:  [facility], [place] [address] [phone]    Clinical needs requested:    Geography searched:  [address]    Start of Service:    Request sent:  12:42pm EST on 12/27/2023 by Madeline Posada    Partner reserved:  10:22am EST on 12/28/2023 by Madeline Posada    Choice list shared:  10:21am EST on 12/28/2023 by Madeline Posada

## 2023-12-28 NOTE — PROGRESS NOTES
[facility]  Progress Note  Name: Sidney Wheeler I  MRN: [id number]  Unit/Bed#: [facility] 711-01 I Date of Admission: 12/26/2023   Date of Service: 12/28/2023 I Hospital Day: 2    Assessment/Plan   * Acute respiratory failure with hypoxia (HCC)  Assessment & Plan  *7 on RA  Patient presented to the hospital with generalized weakness which has been going on for weeks.  Cough and expectoration and also posttussive emesis present.  No fever at home  Leukocytosis noted.  Mildly elevated procalcitonin present.  CT chest c/w pleural effusions  O2 for comfort    Closed fracture of first lumbar vertebra (HCC)  Assessment & Plan  Denies pain  Oxycodone prn    Transaminitis  Assessment & Plan  Patient noted to have new onset transaminitis.  No abdominal pain on examination.  CT abdomen and pelvis w/ liver mets    DVT (deep venous thrombosis) ([facility])  Assessment & Plan  Patient with elevated D-dimer.  Patient also with a worsening malignancy/CKD.    Denies any shortness of breath  Given creatinine of 3.5 will hold off ordering CTA.  LEVD w/ LLE distal DVT - no ACE due to prior SAH and pt going on hospice    Atrial flutter ([facility])  Assessment & Plan  Patient with history of a flutter.  Currently in sinus rhythm.  Not on anticoagulation due to fall risk  BB for comfort    SAH (subarachnoid hemorrhage) ([facility])  Assessment & Plan  Patient was admitted to the hospital after a fall.  Noted to have subarachnoid hemorrhage/subdural hemorrhage.  Repeat imaging showed resolution of the subarachnoid hemorrhage.  Hold AP and AC    Acute kidney injury superimposed on chronic kidney disease   Assessment & Plan  Lab Results   Component Value Date    EGFR 14 12/27/2023    EGFR 15 12/26/2023    EGFR 23 11/13/2023    CREATININE 3.65 (H) 12/27/2023    CREATININE 3.52 (H) 12/26/2023    CREATININE 2.41 (H) 11/13/2023   Estimated Creatinine Clearance: 15.7 mL/min (A) (by C-G formula based on SCr of 3.65 mg/dL  (H)).  Patient with CKD stage IV.  Baseline creatinine appears to be   2-2.8 per previous nephrology progress note  Creatinine on admission is 3.52.  Patient with generalized p.o. intake and decreased p.o. intake.  No improvement w/ IVF  Now on hospice        Benign hypertension with chronic kidney disease, stage IV (HCC)  Assessment & Plan  Lab Results   Component Value Date    EGFR 14 12/27/2023    EGFR 15 12/26/2023    EGFR 23 11/13/2023    CREATININE 3.65 (H) 12/27/2023    CREATININE 3.52 (H) 12/26/2023    CREATININE 2.41 (H) 11/13/2023   Patient is on hydralazine and amlodipine and also metoprolol as outpatient   C/w BB for comfort    Elevated troponin  Assessment & Plan  Patient with elevated troponin-4705  at the time of presentation.  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 CT scan on December 2022 showed no evidence of disease in the abdomen or pelvis   Repeat imaging done recently showed a worsening of the disease process.  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  CT CAP worsening of malignancy - HemOnc appreciated.  Pt opted for comfort care/hospice    Hx of CABG  Assessment & Plan  With known history of coronary artery disease status post CABG.  Patient is on statin and beta-blocker.  Likely aspirin discontinued due to recent fall and subdural/subarachnoid hemorrhage.  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  Hold oral hypoglycemic agent.    Stop ISS w/ pt on hospice             VTE Pharmacologic Prophylaxis:    hospice    Mobility:   Basic Mobility Inpatient Raw Score: 12  JH-HLM Goal: 4: Move to chair/commode  JH-HLM Achieved: 5: Stand (1 or more minutes)  HLM Goal achieved. Continue to encourage appropriate mobility.    Patient Centered Rounds: I performed bedside rounds with nursing staff today.   Discussions with Specialists or Other Care Team Provider: no    Education and Discussions with Family / Patient:  CM and hospice spoke to wife.         Current Length of Stay: 2 day(s)  Current Patient Status: Inpatient   Certification Statement: The patient will continue to require additional inpatient hospital stay due to need to provide comfort care  Discharge Plan: Anticipate discharge in 48 hrs to home on hospice    Code Status: Level 4 - Comfort Care    Subjective:   Pt comfortable    Objective:     Vitals:   No data recorded.    HR:  [85] 85  BP: (132)/(76) 132/76  Body mass index is 28.42 kg/m².     Input and Output Summary (last 24 hours):     Intake/Output Summary (Last 24 hours) at 12/28/2023 1510  Last data filed at 12/28/2023 0950  Gross per 24 hour   Intake --   Output 350 ml   Net -350 ml       Physical Exam:   Physical Exam  HENT:      Head: Normocephalic and atraumatic.      Nose: Nose normal.      Mouth/Throat:      Mouth: Mucous membranes are moist.   Eyes:      Extraocular Movements: Extraocular movements intact.      Conjunctiva/sclera: Conjunctivae normal.   Cardiovascular:       Rate and Rhythm: Normal rate and regular rhythm.   Pulmonary:      Effort: Pulmonary effort is normal.      Breath sounds: Normal breath sounds.   Abdominal:      General: Bowel sounds are normal.      Palpations: Abdomen is soft.   Musculoskeletal:         General: Normal range of motion.      Cervical back: Normal range of motion and neck supple.      Right lower leg: No edema.      Left lower leg: No edema.   Skin:     General: Skin is warm and dry.   Neurological:      Mental Status: He is alert and oriented to person, place, and time.          Additional Data:     Labs:  Results from last 7 days   Lab Units 12/27/23  0456 12/26/23  1100   WBC Thousand/uL 18.95* 17.94*   HEMOGLOBIN g/dL 11.4* 11.8*   HEMATOCRIT % 37.8 38.9   PLATELETS Thousands/uL 277 309   NEUTROS PCT %  --  88*   LYMPHS PCT %  --  3*   MONOS PCT %  --  8   EOS PCT %  --  0     Results from last 7 days   Lab Units 12/27/23  0456   SODIUM mmol/L 134*   POTASSIUM mmol/L 5.1   CHLORIDE mmol/L 99   CO2 mmol/L 17*   BUN mg/dL 63*   CREATININE mg/dL 3.65*   ANION GAP mmol/L 18   CALCIUM mg/dL 8.8   ALBUMIN g/dL 3.1*   TOTAL BILIRUBIN mg/dL 0.81   ALK PHOS U/L 157*   ALT U/L 111*   AST U/L 151*   GLUCOSE RANDOM mg/dL 174*     Results from last 7 days   Lab Units 12/26/23  1237   INR  1.29*     Results from last 7 days   Lab Units 12/27/23  1128 12/27/23  0614 12/26/23  2132 12/26/23  1630   POC GLUCOSE mg/dl 170* 172* 154* 160*         Results from last 7 days   Lab Units 12/27/23  0456 12/26/23  1237   LACTIC ACID mmol/L  --  1.9   PROCALCITONIN ng/ml 0.78* 0.69*       Lines/Drains:  Invasive Devices       Drain  Duration             Urostomy Ureterostomy right  days                          Imaging: No pertinent imaging reviewed.    Recent Cultures (last 7 days):   Results from last 7 days   Lab Units 12/26/23  1242 12/26/23  1237   BLOOD CULTURE  No Growth at 48 hrs. No Growth at 48 hrs.       Last 24 Hours Medication List:   Current  Facility-Administered Medications   Medication Dose Route Frequency Provider Last Rate    acetaminophen  650 mg Oral Q4H PRN Hue Hanna MD      bisacodyl  10 mg Rectal Once Adal De La O DO      citalopram  20 mg Oral Daily Hue Hanna MD      docusate sodium  100 mg Oral BID Hue Hanna MD      fluticasone  1 spray Nasal Daily Hue Hanna MD      loratadine  10 mg Oral Daily PRN Hue Hanna MD      LORazepam  0.5 mg Oral Q8H PRN Adal De La O DO      melatonin  6 mg Oral HS Hue Hanna MD      metoprolol tartrate  25 mg Oral Q12H MARIA LUZ Hue Hanna MD      ondansetron  4 mg Intravenous Q6H PRN Hue Hanna MD      oxyCODONE  5 mg Oral Q2H PRN Adal De La O DO      pantoprazole  40 mg Oral Early Morning Hue Hanna MD      polyethylene glycol  17 g Oral Daily Hue Hanna MD      senna  1 tablet Oral Daily Hue Hanna MD      simethicone  80 mg Oral 4x Daily PRN Hue Hanna MD      sodium bicarbonate  1,300 mg Oral BID Hue Hanna MD      traZODone  25 mg Oral HS PRN Hue Hanna MD          Today, Patient Was Seen By: Adal De La O DO    **Please Note: This note may have been constructed using a voice recognition system.**

## 2023-12-28 NOTE — ASSESSMENT & PLAN NOTE
Lab Results   Component Value Date    HGBA1C 6.5 (H) 10/13/2023       Recent Labs     12/26/23  1630 12/26/23  2132 12/27/23  0614 12/27/23  1128   POCGLU 160* 154* 172* 170*         Blood Sugar Average: Last 72 hrs:  (P) 164Is on Farxiga as outpatient.  Hold oral hypoglycemic agent.    Stop ISS w/ pt on hospice

## 2023-12-28 NOTE — ASSESSMENT & PLAN NOTE
Lab Results   Component Value Date    EGFR 14 12/27/2023    EGFR 15 12/26/2023    EGFR 23 11/13/2023    CREATININE 3.65 (H) 12/27/2023    CREATININE 3.52 (H) 12/26/2023    CREATININE 2.41 (H) 11/13/2023   Estimated Creatinine Clearance: 15.7 mL/min (A) (by C-G formula based on SCr of 3.65 mg/dL (H)).  Patient with CKD stage IV.  Baseline creatinine appears to be   2-2.8 per previous nephrology progress note  Creatinine on admission is 3.52.  Patient with generalized p.o. intake and decreased p.o. intake.  No improvement w/ IVF  Now on hospice

## 2023-12-28 NOTE — ASSESSMENT & PLAN NOTE
Patient was admitted to the hospital after a fall.  Noted to have subarachnoid hemorrhage/subdural hemorrhage.  Repeat imaging showed resolution of the subarachnoid hemorrhage.  Hold AP and AC

## 2023-12-28 NOTE — ASSESSMENT & PLAN NOTE
Patient with elevated D-dimer.  Patient also with a worsening malignancy/CKD.    Denies any shortness of breath  Given creatinine of 3.5 will hold off ordering CTA.  LEVD w/ LLE distal DVT - no ACE due to prior SAH and pt going on hospice

## 2023-12-29 ENCOUNTER — TELEPHONE (OUTPATIENT)
Dept: NEPHROLOGY | Facility: CLINIC | Age: 83
End: 2023-12-29

## 2023-12-29 NOTE — TELEPHONE ENCOUNTER
Pt's wife Sheyla called to cancel future appt with Dr French, as the pt passed away yesterday 12/28/24. I thanked her for letting us know and gave her our condolences.

## 2023-12-29 NOTE — ASSESSMENT & PLAN NOTE
Patient presented to the hospital with generalized weakness which has been going on for weeks.  Cough and expectoration and also posttussive emesis present.  No fever at home  Leukocytosis noted.  Mildly elevated procalcitonin present.  CT chest c/w pleural effusions  Patient transition to level 4 comfort care on 12/27/2023; supplemental O2 for comfort

## 2023-12-29 NOTE — DISCHARGE SUMMARY
[facility]  Discharge- Sidney Wheeler 1940, 83 y.o. male MRN: [id number]  Unit/Bed#: [facility] 711-01 Encounter: [id number]  Primary Care Provider: Sixto Falcon DO   Date and time admitted to hospital: 12/26/2023 10:14 AM    * Acute respiratory failure with hypoxia (HCC)  Assessment & Plan  Patient presented to the hospital with generalized weakness which has been going on for weeks.  Cough and expectoration and also posttussive emesis present.  No fever at home  Leukocytosis noted.  Mildly elevated procalcitonin present.  CT chest c/w pleural effusions  Patient transition to level 4 comfort care on 12/27/2023; supplemental O2 for comfort    SAH (subarachnoid hemorrhage) (HCC)  Assessment & Plan  Patient was admitted to the hospital after a fall.  Noted to have subarachnoid hemorrhage/subdural hemorrhage.  Repeat imaging showed resolution of the subarachnoid hemorrhage.  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 CT scan on December 2022 showed no evidence of disease in the abdomen or pelvis   Repeat imaging done recently showed a worsening of the disease process.  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  CT CAP worsening of malignancy - HemOnc appreciated.  Pt opted for comfort care/hospice    Hx of CABG  Assessment & Plan  With known history of coronary artery disease status post CABG.  Patient is on statin and beta-blocker.  Likely aspirin discontinued due to recent fall and subdural/subarachnoid hemorrhage.  Patient with  elevated troponin and cardiology evaluation appreciated  Echo cancelled w/ pt on hospice          Medical Problems       Resolved Problems  Date Reviewed: 12/28/2023   None         Admission Date:   Admission Orders (From admission, onward)       Ordered        12/26/23 1404  INPATIENT ADMISSION  Once                            Admitting Diagnosis: Vomiting [R11.10]  Pneumonia [J18.9]  Dyspnea [R06.00]  History of bladder cancer [Z85.51]  Positive D dimer [R79.89]  KO (acute kidney injury) (HCC) [N17.9]  Elevated troponin I level [R79.89]    HPI by Dr. Hanna on 12/26: Sidney Wheeler is a 83 y.o. male with a PMH of bladder cancer status post urostomy placement with recurrence, coronary artery disease status post CABG.  CKD stage IV, hypertension, type 2 diabetes mellitus presented today with generalized weakness.  Patient reported that his symptoms has been going on for the past 3 weeks or so.  Patient with gradually increasing generalized weakness with cough and expectoration.  Patient also reported posttussive emesis.  Denies any abdominal pain.  Patient also with low p.o. intake.  Patient denies any fever or chills.  Patient with poor p.o. intake and may have lost some weight recently.  Denies any chest pain or any palpitation.  Patient with recent fall and was admitted under trauma.  At that time patient noted to have subarachnoid and subdural hemorrhage and also closed fracture of the temporal bone on the right side with blood in the right ear.  During the trauma admission patient had a CT abdomen and pelvis showing worsening of the malignancy.  Patient was evaluated by hematology oncology on 11/16/2023 at that time plan was to repeat imaging studies after the holidays and make the decision regarding palliative care versus further treatment.  Patient had a chest x-ray in the emergency room that was concerning for pneumonia.  Will order a CT chest abdomen and pelvis to evaluate the pulmonary and  intra-abdominal process.  Wife noticed that the patient with a left-sided abdominal distention.  Patient complaining of constipation and also pain in his left buttocks.  Patient also complaining of abdominal pain which he attributes to constipation     Procedures Performed:   Orders Placed This Encounter   Procedures    Critical Care       Summary of Hospital Course: She arrived to the hospital 2023 with complaints of generalized weakness.  Workup included CT chest abdomen pelvis which showed evidence of worsening metastatic disease.  His case was discussed with hematology/oncology and he and his wife opted for hospice.  He was transition to level for DNR on 2023.      I was contacted by nursing staff to evaluate the patient found without vital signs. Patient was identified visually and identification was confirmed by hospital ID bracelet. Patient was found laying in bed . Personally examined the patient without heart sounds auscultated on exam nor were pulses detected x 2. No breath sounds were appreciated after 2 minutes of auscultation. Pupils were fixed and non-reactive to light. Patient was pronounced dead on 2023 at 2145        Significant Findings, Care, Treatment and Services Provided: N/A    Complications: Worsening metastatic disease    Final Diagnosis: Malignant neoplasm of urinary bladder neck        PCP: Sixto Falcon DO    Disposition:

## 2023-12-29 NOTE — DEATH NOTE
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)    Hx of CABG    Malignant neoplasm of overlapping sites of bladder (HCC)    Closed fracture of upper end of right fibula    History of bladder cancer    Coronary artery disease involving native coronary artery of native heart without angina pectoris    Ischemic cardiomyopathy    Positive urinary cytology    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)    Liver function study, abnormal    Elevated troponin    Forearm mass, left    Fungal dermatitis    Anxiety and depression    Overweight    Ambulatory dysfunction    Frequent falls    Right sided Pelvic abscess in male (HCC)    Severe protein-calorie malnutrition (HCC)    Metabolic acidosis    Hypomagnesemia    RBBB    Benign hypertension with chronic kidney disease, stage IV (HCC)    Persistent proteinuria    Anemia    Chronic kidney disease-mineral and bone disorder    Visual hallucinations    Functional diarrhea    Platelets decreased (HCC)    Urethral tumor    Secondary hyperparathyroidism of renal origin (HCC)    Acute kidney injury superimposed on chronic kidney disease     Anemia due to stage 4 chronic kidney disease     Shortness of breath    Chemotherapy-induced thrombocytopenia    Rib pain on left side    Left inguinal pain    Chronic diastolic CHF (congestive heart failure) (HCC)    Pelvic lymphadenopathy    Hyponatremia    Fracture of temporal  bone (HCC)    SDH (subdural hematoma) (HCC)    Fall    SAH (subarachnoid hemorrhage) (HCC)    Pneumocephalus    History of stroke    Abnormal findings on imaging test    Atrial flutter (HCC)    Leukocytosis    GERD (gastroesophageal reflux disease)    Sinus congestion    Insomnia    Acute headache    Snoring    Skin tear of right forearm without complication    Sebaceous cyst of axilla    DVT (deep venous thrombosis) (HCC)    Transaminitis    Generalized weakness    Acute respiratory failure with hypoxia (HCC)    Closed fracture of first lumbar vertebra ([facility])       PRONOUNCEMENT OF DEATH    I was contacted by nursing staff to evaluate the patient found without vital signs. Patient was identified visually and identification was confirmed by hospital ID bracelet. Patient was found laying in bed . Personally examined the patient without heart sounds auscultated on exam nor were pulses detected x 2. No breath sounds were appreciated after 2 minutes of auscultation. Pupils were fixed and non-reactive to light. Patient was pronounced dead at this date and time.      home is [facility] in [place]. Phone number is [phone].     Please kindly review hospital chart for all details of this hospitalization and circumstances leading to death.     Death certificate will be signed my attending physician at a later time.

## 2023-12-31 DIAGNOSIS — F41.9 ANXIETY AND DEPRESSION: ICD-10-CM

## 2023-12-31 DIAGNOSIS — F32.A ANXIETY AND DEPRESSION: ICD-10-CM

## 2023-12-31 LAB
BACTERIA BLD CULT: NORMAL
BACTERIA BLD CULT: NORMAL

## 2023-12-31 RX ORDER — CITALOPRAM 20 MG/1
TABLET ORAL
Qty: 30 TABLET | Refills: 3 | OUTPATIENT
Start: 2023-12-31

## 2024-01-03 DIAGNOSIS — F32.A ANXIETY AND DEPRESSION: ICD-10-CM

## 2024-01-03 DIAGNOSIS — F41.9 ANXIETY AND DEPRESSION: ICD-10-CM

## 2024-01-03 RX ORDER — CITALOPRAM 20 MG/1
TABLET ORAL
Qty: 30 TABLET | Refills: 3 | OUTPATIENT
Start: 2024-01-03

## 2024-03-01 NOTE — PLAN OF CARE
Problem: Potential for Falls  Goal: Patient will remain free of falls  Description: INTERVENTIONS:  - Educate patient/family on patient safety including physical limitations  - Instruct patient to call for assistance with activity   - Consult OT/PT to assist with strengthening/mobility   - Keep Call bell within reach  - Keep bed low and locked with side rails adjusted as appropriate  - Consider moving patient to room near nurses station  Outcome: Progressing Yes...

## (undated) DEVICE — NEEDLE BLUNT 18 G X 1 1/2IN

## (undated) DEVICE — TELFA NON-ADHERENT ABSORBENT DRESSING: Brand: TELFA

## (undated) DEVICE — CATH FOLEY 24FR 30ML 2 WAY SILICONE ELASTIMER

## (undated) DEVICE — LASER FIBER: Brand: MOXY

## (undated) DEVICE — LUBRICANT SURGILUBE TUBE 4 OZ  FLIP TOP

## (undated) DEVICE — CORD ENDO MONOPOLAR 10FT STRL DISP

## (undated) DEVICE — Device: Brand: OLYMPUS

## (undated) DEVICE — UROCATCH BAG

## (undated) DEVICE — SCD SEQUENTIAL COMPRESSION COMFORT SLEEVE MEDIUM KNEE LENGTH: Brand: KENDALL SCD

## (undated) DEVICE — GUARDIAN LVC: Brand: GUARDIAN

## (undated) DEVICE — SPECIMEN CONTAINER STERILE PEEL PACK

## (undated) DEVICE — TUBING SUCTION 5MM X 12 FT

## (undated) DEVICE — IV SET 15 DROP 3/Y GRAVITY CARESITE

## (undated) DEVICE — BAG URINE DRAINAGE 2000ML ANTI RFLX LF

## (undated) DEVICE — PACK TUR

## (undated) DEVICE — INVIEW CLEAR LEGGINGS: Brand: CONVERTORS

## (undated) DEVICE — GLOVE SRG BIOGEL 7.5

## (undated) DEVICE — GLOVE SRG BIOGEL ECLIPSE 7.5

## (undated) DEVICE — STERILE CYSTO PACK: Brand: CARDINAL HEALTH

## (undated) DEVICE — COBAN 4 IN STERILE

## (undated) DEVICE — 60 ML SYRINGE,TOOMEY TYPE: Brand: MONOJECT

## (undated) DEVICE — NEEDLE 18 G X 1 1/2

## (undated) DEVICE — 4-PORT MANIFOLD: Brand: NEPTUNE 2

## (undated) DEVICE — TRANSPOSAL ULTRAFLEX DUO/QUAD ULTRA CART MANIFOLD

## (undated) DEVICE — CATH URETERAL 5FR X 70 CM FLEX TIP POLYUR BARD

## (undated) DEVICE — STERILE POLYISOPRENE POWDER-FREE SURGICAL GLOVES: Brand: PROTEXIS

## (undated) DEVICE — CATH FOLEY 20FR 5ML 2 WAY UNCOATED SILICONE

## (undated) DEVICE — GLOVE INDICATOR PI UNDERGLOVE SZ 8 BLUE

## (undated) DEVICE — STRETCH BANDAGE: Brand: CURITY

## (undated) DEVICE — GLOVE SRG BIOGEL 8